# Patient Record
Sex: FEMALE | Race: BLACK OR AFRICAN AMERICAN | NOT HISPANIC OR LATINO | Employment: OTHER | URBAN - METROPOLITAN AREA
[De-identification: names, ages, dates, MRNs, and addresses within clinical notes are randomized per-mention and may not be internally consistent; named-entity substitution may affect disease eponyms.]

---

## 2017-05-11 ENCOUNTER — HOSPITAL ENCOUNTER (EMERGENCY)
Facility: HOSPITAL | Age: 62
Discharge: HOME | End: 2017-05-11
Attending: EMERGENCY MEDICINE
Payer: COMMERCIAL

## 2017-05-11 VITALS
WEIGHT: 230 LBS | OXYGEN SATURATION: 98 % | HEART RATE: 100 BPM | RESPIRATION RATE: 18 BRPM | HEIGHT: 67 IN | DIASTOLIC BLOOD PRESSURE: 88 MMHG | TEMPERATURE: 99 F | SYSTOLIC BLOOD PRESSURE: 173 MMHG | BODY MASS INDEX: 36.1 KG/M2

## 2017-05-11 DIAGNOSIS — J68.0 ACUTE CHEMICAL PNEUMONITIS: ICD-10-CM

## 2017-05-11 PROCEDURE — 94640 AIRWAY INHALATION TREATMENT: CPT

## 2017-05-11 PROCEDURE — 25000242 PHARM REV CODE 250 ALT 637 W/ HCPCS: Performed by: EMERGENCY MEDICINE

## 2017-05-11 PROCEDURE — 63600175 PHARM REV CODE 636 W HCPCS: Performed by: EMERGENCY MEDICINE

## 2017-05-11 PROCEDURE — 99284 EMERGENCY DEPT VISIT MOD MDM: CPT

## 2017-05-11 RX ORDER — PREDNISONE 20 MG/1
40 TABLET ORAL
Status: COMPLETED | OUTPATIENT
Start: 2017-05-11 | End: 2017-05-11

## 2017-05-11 RX ORDER — PREDNISONE 20 MG/1
40 TABLET ORAL DAILY
Qty: 8 TABLET | Refills: 0 | Status: SHIPPED | OUTPATIENT
Start: 2017-05-11 | End: 2017-05-15

## 2017-05-11 RX ORDER — IPRATROPIUM BROMIDE AND ALBUTEROL SULFATE 2.5; .5 MG/3ML; MG/3ML
3 SOLUTION RESPIRATORY (INHALATION)
Status: COMPLETED | OUTPATIENT
Start: 2017-05-11 | End: 2017-05-11

## 2017-05-11 RX ADMIN — IPRATROPIUM BROMIDE AND ALBUTEROL SULFATE 3 ML: .5; 3 SOLUTION RESPIRATORY (INHALATION) at 02:05

## 2017-05-11 RX ADMIN — PREDNISONE 40 MG: 20 TABLET ORAL at 02:05

## 2017-05-11 NOTE — ED AVS SNAPSHOT
[facility]  [id number] [place] [id number]               Casie Frias Givens   2017  1:33 AM   ED    Description:  Female : 1955   Department:  [facility]           Your Care was Coordinated By:     Provider Role From To    Alcon Larson MD Attending Provider 17 0152 --      Reason for Visit     Toxic Inhalation           Diagnoses this Visit        Comments    Acute chemical pneumonitis           ED Disposition     ED Disposition Condition Comment    Discharge             To Do List           Follow-up Information     Follow up with Buck Epperson MD In 4 days.    Specialty:  Family Medicine    Contact information:    [id number]  [facility]  [facility] [address] [phone]          Follow up with [facility].    Specialty:  Emergency Medicine    Why:  As needed, If symptoms worsen    Contact information:    [address] [place] [address] [phone]       These Medications        Disp Refills Start End    predniSONE (DELTASONE) 20 MG tablet 8 tablet 0 2017 5/15/2017    Take 2 tablets (40 mg total) by mouth once daily. - Oral    Pharmacy: [id number] [place], [place] - [id number] [place] [address]. Ph #: [phone]         [facility]     [facility] Care Line -  Assistance  Unless otherwise directed by your provider, please contact [facility], our nurse care line that is available for  assistance.     Registered nurses in the [facility] provide: appointment scheduling, clinical advisement, health education, and other advisory services.  Call: [phone] (toll free)               Medications           START taking these NEW medications        Refills    predniSONE (DELTASONE) 20 MG tablet 0    Sig: Take 2 tablets (40 mg total) by mouth once daily.    Class: Print     "Route: Oral      These medications were administered today        Dose Freq    albuterol-ipratropium 2.5mg-0.5mg/3mL nebulizer solution 3 mL 3 mL Every 5 min    Sig: Take 3 mLs by nebulization every 5 (five) minutes.    Class: Normal    Route: Nebulization    predniSONE tablet 40 mg 40 mg ED 1 Time    Sig: Take 2 tablets (40 mg total) by mouth ED 1 Time.    Class: Normal    Route: Oral           Verify that the below list of medications is an accurate representation of the medications you are currently taking.  If none reported, the list may be blank. If incorrect, please contact your healthcare provider. Carry this list with you in case of emergency.           Current Medications     multivitamin with minerals tablet Take 1 tablet by mouth once daily.    olmesartan-hydrochlorothiazide (BENICAR HCT) 20-12.5 mg per tablet Take 1 tablet by mouth.    pravastatin (PRAVACHOL) 80 MG tablet Take 80 mg by mouth.    alprazolam (XANAX) 0.25 MG tablet Take 0.25 mg by mouth.    escitalopram oxalate (LEXAPRO) 20 MG tablet Take 20 mg by mouth.    predniSONE (DELTASONE) 20 MG tablet Take 2 tablets (40 mg total) by mouth once daily.           Clinical Reference Information           Your Vitals Were     BP Pulse Temp Resp Height Weight    173/88 100 98.7 °F (37.1 °C) (Oral) 18 5' 7" (1.702 m) 104.3 kg (230 lb)    SpO2 BMI             98% 36.02 kg/m2         Allergies as of 5/11/2017     No Known Allergies      Immunizations Administered on Date of Encounter - 5/11/2017     None      ED Micro, Lab, POCT     None      ED Imaging Orders     Start Ordered       Status Ordering Provider    05/11/17 0201 05/11/17 0200  X-Ray Chest 1 View  1 time imaging      In process         Discharge Instructions         Bronchitis, Viral (Adult)    You have a viral bronchitis. Bronchitis is inflammation and swelling of the lining of the lungs. This is often caused by an infection. Symptoms include a dry, hacking cough that is worse at night. The " cough may bring up yellow-green mucus. You may also feel short of breath or wheeze. Other symptoms may include tiredness, chest discomfort, and chills.  Bronchitis that is caused by a virus is not treated with antibiotics. Instead, medicines may be given to help relieve symptoms. Symptoms can last up to 2 weeks, although the cough may last much longer.  This illness is contagious during the first few days and is spread through the air by coughing and sneezing, or by direct contact (touching the sick person and then touching your own eyes, nose, or mouth).  Most viral illnesses resolve within 10 to 14 days with rest and simple home remedies, although they may sometimes last for several weeks.  Home care  · If symptoms are severe, rest at home for the first 2 to 3 days. When you go back to your usual activities, don't let yourself get too tired.  · Do not smoke. Also avoid being exposed to secondhand smoke.  · You may use over-the-counter medicine to control fever or pain, unless another pain medicine was prescribed. (Note: If you have chronic liver or kidney disease or have ever had a stomach ulcer or gastrointestinal bleeding, talk with your healthcare provider before using these medicines. Also talk to your provider if you are taking medicine to prevent blood clots.) Aspirin should never be given to anyone younger than 18 years of age who is ill with a viral infection or fever. It may cause severe liver or brain damage.  · Your appetite may be poor, so a light diet is fine. Avoid dehydration by drinking 6 to 8 glasses of fluids per day (such as water, soft drinks, sports drinks, juices, tea, or soup). Extra fluids will help loosen secretions in the nose and lungs.  · Over-the-counter cough, cold, and sore-throat medicines will not shorten the length of the illness, but they may help to reduce symptoms. (Note: Do not use decongestants if you have high blood pressure.)  Follow-up care  Follow up with your healthcare  provider, or as advised. If you had an X-ray or ECG (electrocardiogram), a specialist will review it. You will be notified of any new findings that may affect your care.  Note: If you are age 65 or older, or if you have a chronic lung disease or condition that affects your immune system, or you smoke, talk to your healthcare provider about having pneumococcal vaccinations and a yearly influenza vaccination (flu shot).  When to seek medical advice  Call your healthcare provider right away if any of these occur:  · Fever of 100.4°F (38°C) or higher  · Coughing up increased amounts of colored sputum  · Weakness, drowsiness, headache, facial pain, ear pain, or a stiff neck  Call 911, or get immediate medical care  Contact emergency services right away if any of these occur:  · Coughing up blood  · Worsening weakness, drowsiness, headache, or stiff neck  · Trouble breathing, wheezing, or pain with breathing  Date Last Reviewed: 9/13/2015  © 4671-2630 [employer]. [address] [place] [address]. All rights reserved. This information is not intended as a substitute for professional medical care. Always follow your healthcare professional's instructions.          [facility] Sign-Up     Activating your [facility] account is as easy as 1-2-3!     1) Visit [email or web address], select Sign Up Now, enter this activation code and your date of birth, then select Next.  [email or web address]  Expires: 6/25/2017  3:37 AM      2) Create a username and password to use when you visit [facility] in the future and select a security question in case you lose your password and select Next.    3) Enter your e-mail address and click Sign Up!    Additional Information  If you have questions, please e-mail [email or web address] or call [phone] to talk to our [facility] staff. Remember, [facility] is NOT to be used for urgent needs. For medical emergencies, dial 911.         Smoking Cessation     If you would like to quit  smoking:   You may be eligible for free services if you are a [place] resident and started smoking cigarettes before September 1, 1988.  Call the Smoking Cessation Trust (SCT) toll free at (390) 468-9335 or (866) 193-2198.   Call 1-800-QUIT-NOW if you do not meet the above criteria.   Contact us via email: [email or web address]   View our website for more information: [email or web address]         [facility]  complies with applicable Federal civil rights laws and does not discriminate on the basis of race, color, national origin, age, disability, or sex.        Language Assistance Services     ATTENTION: Language assistance services are available, free of charge. Please call [phone].      ATENCIÓN: Si habla oanh, tiene a navas disposición servicios gratuitos de asistencia lingüística. Llame al [phone].     CHÚ Ý: N?u b?n nói Ti?ng Vi?t, có các d?ch v? h? tr? ngôn ng? mi?n phí dành cho b?n. G?i s? [phone].

## 2017-05-11 NOTE — ED PROVIDER NOTES
"SCRIBE #1 NOTE: I, Emely Mckeon, am scribing for, and in the presence of, Alcon Larson MD. I have scribed the entire note.      History      Chief Complaint   Patient presents with    Toxic Inhalation     mixing "shark" and bleach to clean mold. coughing and SOB since inhalation       Review of patient's allergies indicates:  No Known Allergies     HPI   HPI    2017, 1:59 AM   History obtained from the patient      History of Present Illness: Casie Gaines is a 61 y.o. female patient who presents to the Emergency Department for toxic inhalation which onset suddenly today. Symptoms are episodic and moderate in severity. Pt states that she sprayed mold remover on her floor boards and then bleach 2 hours later. No mitigating or exacerbating factors reported. Associated sxs include eye redness, nausea, emesis, SOB and cough. Patient denies any fever, diarrhea, abd pain, visual disturbance, dizziness, lightheadedness, HA, CP, back pain, flank pain, dysuria, hematuria, and all other sxs at this time. No further complaints or concerns at this time.         Arrival mode: Personal vehicle    PCP: Buck Epperson MD       Past Medical History:  Past Medical History:   Diagnosis Date    Anemia     Anxiety     Arthritis     knees    CHF (congestive heart failure)     Hyperlipemia     Hypertension     Neck pain        Past Surgical History:  Past Surgical History:   Procedure Laterality Date     SECTION      X 1    DILATION AND CURETTAGE OF UTERUS      HYSTERECTOMY      RALH for fibroids (still has ovaries)    TUBAL LIGATION           Family History:  Family History   Problem Relation Age of Onset    Anesthesia problems Other     Breast cancer Maternal Aunt     Breast cancer Paternal Aunt     Ovarian cancer Paternal Aunt     Colon cancer Brother     Thrombophilia Neg Hx        Social History:  Social History     Social History Main Topics    Smoking status: Former Smoker     Quit date: " 1/1/1986    Smokeless tobacco: Never Used      Comment: States started back 2 months ago after 30 years    Alcohol use 0.0 oz/week     0 Standard drinks or equivalent per week      Comment: occasionally    Drug use: No    Sexual activity: Yes     Partners: Male      Comment: hyst; mut monog       ROS   Review of Systems   Constitutional: Negative for fever.   HENT: Negative for sore throat.    Eyes: Positive for redness. Negative for discharge and visual disturbance.   Respiratory: Positive for cough and shortness of breath. Negative for wheezing.    Cardiovascular: Negative for chest pain.   Gastrointestinal: Positive for nausea and vomiting. Negative for abdominal pain, constipation and diarrhea.   Genitourinary: Negative for decreased urine volume, difficulty urinating, dysuria, flank pain and hematuria.   Musculoskeletal: Negative for back pain.   Skin: Negative for rash.   Neurological: Negative for dizziness, weakness, light-headedness and headaches.   Hematological: Does not bruise/bleed easily.       Physical Exam    Initial Vitals   BP Pulse Resp Temp SpO2   05/11/17 0131 05/11/17 0131 05/11/17 0131 05/11/17 0131 05/11/17 0131   173/88 90 18 98.7 °F (37.1 °C) 97 %      Physical Exam  Nursing Notes and Vital Signs Reviewed.  Constitutional: Patient is in no acute distress. Awake and alert. Well-developed and well-nourished. Smells of bleach.   Head: Atraumatic. Normocephalic.  Eyes: PERRL. EOM intact. Conjunctivae are not pale. Scleral icterus is erythematous.  ENT: Mucous membranes are moist. Oropharynx is clear and symmetric.    Neck: Supple. Full ROM. No lymphadenopathy.  Cardiovascular: Regular rate. Regular rhythm. No murmurs, rubs, or gallops. Distal pulses are 2+ and symmetric.  Pulmonary/Chest: No respiratory distress. Clear to auscultation bilaterally. No wheezing, rales, or rhonchi.  Abdominal: Soft and non-distended.  There is no tenderness.  No rebound, guarding, or rigidity. Good bowel  "sounds.  Musculoskeletal: Moves all extremities. No obvious deformities. No edema. No calf tenderness.  Skin: Warm and dry.  Neurological:  Alert, awake, and appropriate.  Normal speech.  No acute focal neurological deficits are appreciated.  Psychiatric: Normal affect. Good eye contact. Appropriate in content.    ED Course    Procedures  ED Vital Signs:  Vitals:    05/11/17 0131 05/11/17 0206 05/11/17 0214 05/11/17 0217   BP: (!) 173/88      Pulse: 90 85 88 100   Resp: 18 20 20 18   Temp: 98.7 °F (37.1 °C)      TempSrc: Oral      SpO2: 97% 98% 98% 98%   Weight: 104.3 kg (230 lb)      Height: 5' 7" (1.702 m)          Imaging Results:  Imaging Results         X-Ray Chest 1 View (In process) Per ED physician, pt's CXR results NAF.                    The Emergency Provider reviewed the vital signs and test results, which are outlined above.    ED Discussion     3:39 AM: Reassessed pt at this time. Discussed with pt all pertinent ED information and results. Discussed pt dx and plan of tx. Gave pt all f/u and return to the ED instructions. All questions and concerns were addressed at this time. Pt expresses understanding of information and instructions, and is comfortable with plan to discharge. Pt is stable for discharge.        ED Medication(s):  Medications   albuterol-ipratropium 2.5mg-0.5mg/3mL nebulizer solution 3 mL (3 mLs Nebulization Given 5/11/17 0217)   predniSONE tablet 40 mg (40 mg Oral Given 5/11/17 0207)       Discharge Medication List as of 5/11/2017  3:37 AM      START taking these medications    Details   predniSONE (DELTASONE) 20 MG tablet Take 2 tablets (40 mg total) by mouth once daily., Starting 5/11/2017, Until Mon 5/15/17, Print             Follow-up Information     Follow up with Buck Epperson MD In 4 days.    Specialty:  Family Medicine    Contact information:    [id number]  [facility]  [facility] [address] [phone]          [facility] with [facility]    Specialty:  Emergency Medicine    Why:  As needed, If symptoms worsen    Contact information:    [address] [facility] [address]  [facility] [address] [phone]            Medical Decision Making    Medical Decision Making:   Clinical Tests:   Radiological Study: Ordered and Reviewed           Scribe Attestation:   Scribe #1: I performed the above scribed service and the documentation accurately describes the services I performed. I attest to the accuracy of the note.    Attending:   Physician Attestation Statement for Scribe #1: I, Alcon Larson MD, personally performed the services described in this documentation, as scribed by Emely Mckeon, in my presence, and it is both accurate and complete.          Clinical Impression       ICD-10-CM ICD-9-CM   1. Acute chemical pneumonitis J68.0 506.0     E980.9       Disposition:   Disposition: Discharged  Condition: Stable         Alcon Larson MD  05/11/17 0535
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
## 2017-05-11 NOTE — DISCHARGE INSTRUCTIONS
Bronchitis, Viral (Adult)    You have a viral bronchitis. Bronchitis is inflammation and swelling of the lining of the lungs. This is often caused by an infection. Symptoms include a dry, hacking cough that is worse at night. The cough may bring up yellow-green mucus. You may also feel short of breath or wheeze. Other symptoms may include tiredness, chest discomfort, and chills.  Bronchitis that is caused by a virus is not treated with antibiotics. Instead, medicines may be given to help relieve symptoms. Symptoms can last up to 2 weeks, although the cough may last much longer.  This illness is contagious during the first few days and is spread through the air by coughing and sneezing, or by direct contact (touching the sick person and then touching your own eyes, nose, or mouth).  Most viral illnesses resolve within 10 to 14 days with rest and simple home remedies, although they may sometimes last for several weeks.  Home care  · If symptoms are severe, rest at home for the first 2 to 3 days. When you go back to your usual activities, don't let yourself get too tired.  · Do not smoke. Also avoid being exposed to secondhand smoke.  · You may use over-the-counter medicine to control fever or pain, unless another pain medicine was prescribed. (Note: If you have chronic liver or kidney disease or have ever had a stomach ulcer or gastrointestinal bleeding, talk with your healthcare provider before using these medicines. Also talk to your provider if you are taking medicine to prevent blood clots.) Aspirin should never be given to anyone younger than 18 years of age who is ill with a viral infection or fever. It may cause severe liver or brain damage.  · Your appetite may be poor, so a light diet is fine. Avoid dehydration by drinking 6 to 8 glasses of fluids per day (such as water, soft drinks, sports drinks, juices, tea, or soup). Extra fluids will help loosen secretions in the nose and lungs.  · Over-the-counter  cough, cold, and sore-throat medicines will not shorten the length of the illness, but they may help to reduce symptoms. (Note: Do not use decongestants if you have high blood pressure.)  Follow-up care  Follow up with your healthcare provider, or as advised. If you had an X-ray or ECG (electrocardiogram), a specialist will review it. You will be notified of any new findings that may affect your care.  Note: If you are age 65 or older, or if you have a chronic lung disease or condition that affects your immune system, or you smoke, talk to your healthcare provider about having pneumococcal vaccinations and a yearly influenza vaccination (flu shot).  When to seek medical advice  Call your healthcare provider right away if any of these occur:  · Fever of 100.4°F (38°C) or higher  · Coughing up increased amounts of colored sputum  · Weakness, drowsiness, headache, facial pain, ear pain, or a stiff neck  Call 911, or get immediate medical care  Contact emergency services right away if any of these occur:  · Coughing up blood  · Worsening weakness, drowsiness, headache, or stiff neck  · Trouble breathing, wheezing, or pain with breathing  Date Last Reviewed: 9/13/2015  © 0333-5137 [employer]. [address] [place] [address], [place] [address]. All rights reserved. This information is not intended as a substitute for professional medical care. Always follow your healthcare professional's instructions.

## 2018-01-31 ENCOUNTER — HOSPITAL ENCOUNTER (EMERGENCY)
Facility: HOSPITAL | Age: 63
Discharge: HOME | End: 2018-01-31
Payer: COMMERCIAL

## 2018-01-31 VITALS
HEART RATE: 93 BPM | HEIGHT: 67 IN | TEMPERATURE: 102 F | RESPIRATION RATE: 18 BRPM | OXYGEN SATURATION: 95 % | SYSTOLIC BLOOD PRESSURE: 190 MMHG | WEIGHT: 231 LBS | DIASTOLIC BLOOD PRESSURE: 81 MMHG | BODY MASS INDEX: 36.26 KG/M2

## 2018-01-31 DIAGNOSIS — R05.9 COUGH: ICD-10-CM

## 2018-01-31 DIAGNOSIS — J40 BRONCHITIS: Primary | ICD-10-CM

## 2018-01-31 LAB
ALBUMIN SERPL BCP-MCNC: 4.2 G/DL
ALP SERPL-CCNC: 161 U/L
ALT SERPL W/O P-5'-P-CCNC: 44 U/L
ANION GAP SERPL CALC-SCNC: 10 MMOL/L
AST SERPL-CCNC: 39 U/L
BASOPHILS # BLD AUTO: 0.03 K/UL
BASOPHILS NFR BLD: 0.4 %
BILIRUB SERPL-MCNC: 0.7 MG/DL
BUN SERPL-MCNC: 8 MG/DL
CALCIUM SERPL-MCNC: 9.7 MG/DL
CHLORIDE SERPL-SCNC: 105 MMOL/L
CO2 SERPL-SCNC: 27 MMOL/L
CREAT SERPL-MCNC: 0.8 MG/DL
DIFFERENTIAL METHOD: ABNORMAL
EOSINOPHIL # BLD AUTO: 0.3 K/UL
EOSINOPHIL NFR BLD: 3 %
ERYTHROCYTE [DISTWIDTH] IN BLOOD BY AUTOMATED COUNT: 14.5 %
EST. GFR  (AFRICAN AMERICAN): >60 ML/MIN/1.73 M^2
EST. GFR  (NON AFRICAN AMERICAN): >60 ML/MIN/1.73 M^2
FLUAV AG SPEC QL IA: NEGATIVE
FLUBV AG SPEC QL IA: NEGATIVE
GLUCOSE SERPL-MCNC: 96 MG/DL
HCT VFR BLD AUTO: 43.1 %
HGB BLD-MCNC: 14.1 G/DL
LYMPHOCYTES # BLD AUTO: 1.3 K/UL
LYMPHOCYTES NFR BLD: 15.3 %
MCH RBC QN AUTO: 28.6 PG
MCHC RBC AUTO-ENTMCNC: 32.7 G/DL
MCV RBC AUTO: 87 FL
MONOCYTES # BLD AUTO: 0.6 K/UL
MONOCYTES NFR BLD: 7 %
NEUTROPHILS # BLD AUTO: 6.1 K/UL
NEUTROPHILS NFR BLD: 74.3 %
PLATELET # BLD AUTO: 70 K/UL
PMV BLD AUTO: 11.1 FL
POTASSIUM SERPL-SCNC: 3.6 MMOL/L
PROT SERPL-MCNC: 8 G/DL
RBC # BLD AUTO: 4.93 M/UL
SODIUM SERPL-SCNC: 142 MMOL/L
SPECIMEN SOURCE: NORMAL
WBC # BLD AUTO: 8.24 K/UL

## 2018-01-31 PROCEDURE — 85025 COMPLETE CBC W/AUTO DIFF WBC: CPT

## 2018-01-31 PROCEDURE — 99284 EMERGENCY DEPT VISIT MOD MDM: CPT

## 2018-01-31 PROCEDURE — 87400 INFLUENZA A/B EACH AG IA: CPT

## 2018-01-31 PROCEDURE — 80053 COMPREHEN METABOLIC PANEL: CPT

## 2018-01-31 RX ORDER — METHYLPREDNISOLONE 4 MG/1
TABLET ORAL
Qty: 1 PACKAGE | Refills: 0 | Status: SHIPPED | OUTPATIENT
Start: 2018-01-31 | End: 2018-02-01

## 2018-01-31 RX ORDER — AZITHROMYCIN 250 MG/1
250 TABLET, FILM COATED ORAL DAILY
Qty: 6 TABLET | Refills: 0 | Status: SHIPPED | OUTPATIENT
Start: 2018-01-31 | End: 2018-01-31

## 2018-01-31 RX ORDER — AZITHROMYCIN 250 MG/1
250 TABLET, FILM COATED ORAL DAILY
Qty: 6 TABLET | Refills: 0 | Status: SHIPPED | OUTPATIENT
Start: 2018-01-31 | End: 2018-12-20 | Stop reason: ALTCHOICE

## 2018-02-01 ENCOUNTER — OFFICE VISIT (OUTPATIENT)
Dept: INTERNAL MEDICINE | Facility: CLINIC | Age: 63
End: 2018-02-01
Payer: COMMERCIAL

## 2018-02-01 VITALS
DIASTOLIC BLOOD PRESSURE: 88 MMHG | WEIGHT: 233.69 LBS | OXYGEN SATURATION: 94 % | BODY MASS INDEX: 36.68 KG/M2 | SYSTOLIC BLOOD PRESSURE: 136 MMHG | HEIGHT: 67 IN | HEART RATE: 102 BPM | TEMPERATURE: 99 F

## 2018-02-01 DIAGNOSIS — J40 BRONCHITIS: Primary | ICD-10-CM

## 2018-02-01 PROBLEM — E78.5 HYPERLIPIDEMIA: Status: ACTIVE | Noted: 2018-02-01

## 2018-02-01 PROCEDURE — 99203 OFFICE O/P NEW LOW 30 MIN: CPT | Mod: S$GLB,,, | Performed by: FAMILY MEDICINE

## 2018-02-01 PROCEDURE — 3008F BODY MASS INDEX DOCD: CPT | Mod: S$GLB,,, | Performed by: FAMILY MEDICINE

## 2018-02-01 PROCEDURE — 99999 PR PBB SHADOW E&M-EST. PATIENT-LVL IV: CPT | Mod: PBBFAC,,, | Performed by: FAMILY MEDICINE

## 2018-02-01 RX ORDER — ROSUVASTATIN CALCIUM 20 MG/1
20 TABLET, COATED ORAL DAILY
COMMUNITY
Start: 2017-07-17 | End: 2018-02-08

## 2018-02-01 RX ORDER — OLMESARTAN MEDOXOMIL AND HYDROCHLOROTHIAZIDE 40/12.5 40; 12.5 MG/1; MG/1
1 TABLET ORAL DAILY
COMMUNITY
Start: 2018-01-02 | End: 2018-02-08 | Stop reason: SDUPTHER

## 2018-02-01 RX ORDER — PREDNISONE 20 MG/1
40 TABLET ORAL DAILY
Qty: 10 TABLET | Refills: 0 | Status: SHIPPED | OUTPATIENT
Start: 2018-02-01 | End: 2018-02-06

## 2018-02-01 RX ORDER — ALBUTEROL SULFATE 90 UG/1
2 AEROSOL, METERED RESPIRATORY (INHALATION) EVERY 4 HOURS PRN
Qty: 18 G | Refills: 0 | Status: SHIPPED | OUTPATIENT
Start: 2018-02-01 | End: 2024-02-26

## 2018-02-01 NOTE — ED PROVIDER NOTES
"SCRIBE #1 NOTE: I, Yosef Grossman Escobar, am scribing for, and in the presence of, GATITO Noonan. I have scribed the entire note.      History      Chief Complaint   Patient presents with    Cough     pt c/o cough, congestion, HA, body aches x3 days       Review of patient's allergies indicates:  No Known Allergies     HPI   HPI    2018, 6:39 PM   History obtained from the patient and       History of Present Illness: Casie Gaines is a 62 y.o. female patient who presents to the Emergency Department for non-productive cough which onset gradually 2 days ago. Sxs are constant and moderate in severity. There are no mitigating or exacerbating factors noted. Associated sxs include congestion, HA, fever, body aches. Sxs described as "the flu".  Pt denies any N/V/D, CP, SOB, numbness, ABD pain, dysuria, and all other sxs at this time. Prior tx includes theraflu without relief. No further complaints or concerns at this time.       Arrival mode: Personal vehicle      PCP: Buck Epperson MD       Past Medical History:  Past Medical History:   Diagnosis Date    Anemia     Anxiety     Arthritis     knees    CHF (congestive heart failure)     Hyperlipemia     Hypertension     Neck pain        Past Surgical History:  Past Surgical History:   Procedure Laterality Date     SECTION      X 1    DILATION AND CURETTAGE OF UTERUS      HYSTERECTOMY      RALH for fibroids (still has ovaries)    TUBAL LIGATION           Family History:  Family History   Problem Relation Age of Onset    Anesthesia problems Other     Breast cancer Maternal Aunt     Breast cancer Paternal Aunt     Ovarian cancer Paternal Aunt     Colon cancer Brother     Thrombophilia Neg Hx        Social History:  Social History     Social History Main Topics    Smoking status: Former Smoker     Quit date: 1986    Smokeless tobacco: Never Used      Comment: States started back 2 months ago after 30 years    Alcohol " use 0.0 oz/week      Comment: occasionally    Drug use: No    Sexual activity: Yes     Partners: Male      Comment: hyst; mut monog       ROS   Review of Systems   Constitutional: Positive for fever.   HENT: Positive for congestion. Negative for sore throat.    Respiratory: Positive for cough. Negative for shortness of breath.    Cardiovascular: Negative for chest pain.   Gastrointestinal: Negative for abdominal pain, constipation, diarrhea, nausea and vomiting.   Genitourinary: Negative for dysuria.   Musculoskeletal: Positive for myalgias. Negative for back pain.   Skin: Negative for rash.   Neurological: Positive for headaches. Negative for syncope and weakness.   Hematological: Does not bruise/bleed easily.     Physical Exam      Initial Vitals [01/31/18 1722]   BP Pulse Resp Temp SpO2   (!) 186/94 88 (!) 22 100 °F (37.8 °C) (!) 93 %      MAP       124.67          Physical Exam  Nursing Notes and Vital Signs Reviewed.  Constitutional: Patient is in no acute distress. Well-developed and well-nourished. Active cough.  Head: Atraumatic. Normocephalic.  Eyes: PERRL. EOM intact. Conjunctivae are not pale. No scleral icterus.  ENT: Mucous membranes are moist. Oropharynx is clear and symmetric. Maxillary sinus tenderness. Clear rhinorrhea.  Neck: Supple. Full ROM. No lymphadenopathy.  Cardiovascular: Regular rate. Regular rhythm. No murmurs, rubs, or gallops. Distal pulses are 2+ and symmetric.  Pulmonary/Chest: No respiratory distress. Expiratory wheezing.  Abdominal: Soft and non-distended.  There is no tenderness.  No rebound, guarding, or rigidity. Good bowel sounds.  Genitourinary: No CVA tenderness  Musculoskeletal: Moves all extremities. No obvious deformities. No edema. No calf tenderness.  Skin: Warm and dry.  Neurological:  Alert, awake, and appropriate.  Normal speech.  No acute focal neurological deficits are appreciated.  Psychiatric: Normal affect. Good eye contact. Appropriate in content.    ED Course   "  Procedures  ED Vital Signs:  Vitals:    01/31/18 1722 01/31/18 2000   BP: (!) 186/94 (!) 190/81   Pulse: 88 93   Resp: (!) 22 18   Temp: 100 °F (37.8 °C) (!) 101.8 °F (38.8 °C)   TempSrc: Oral Oral   SpO2: (!) 93% 95%   Weight: 104.8 kg (231 lb)    Height: 5' 7" (1.702 m)        Abnormal Lab Results:  Labs Reviewed   CBC W/ AUTO DIFFERENTIAL - Abnormal; Notable for the following:        Result Value    Platelets 70 (*)     Gran% 74.3 (*)     Lymph% 15.3 (*)     All other components within normal limits   COMPREHENSIVE METABOLIC PANEL - Abnormal; Notable for the following:     Alkaline Phosphatase 161 (*)     All other components within normal limits   INFLUENZA A AND B ANTIGEN        All Lab Results:  Results for orders placed or performed during the hospital encounter of 01/31/18   Influenza antigen Nasopharyngeal Swab   Result Value Ref Range    Influenza A Ag, EIA Negative Negative    Influenza B Ag, EIA Negative Negative    Flu A & B Source Nasopharyngeal Swab    CBC auto differential   Result Value Ref Range    WBC 8.24 3.90 - 12.70 K/uL    RBC 4.93 4.00 - 5.40 M/uL    Hemoglobin 14.1 12.0 - 16.0 g/dL    Hematocrit 43.1 37.0 - 48.5 %    MCV 87 82 - 98 fL    MCH 28.6 27.0 - 31.0 pg    MCHC 32.7 32.0 - 36.0 g/dL    RDW 14.5 11.5 - 14.5 %    Platelets 70 (L) 150 - 350 K/uL    MPV 11.1 9.2 - 12.9 fL    Gran # (ANC) 6.1 1.8 - 7.7 K/uL    Lymph # 1.3 1.0 - 4.8 K/uL    Mono # 0.6 0.3 - 1.0 K/uL    Eos # 0.3 0.0 - 0.5 K/uL    Baso # 0.03 0.00 - 0.20 K/uL    Gran% 74.3 (H) 38.0 - 73.0 %    Lymph% 15.3 (L) 18.0 - 48.0 %    Mono% 7.0 4.0 - 15.0 %    Eosinophil% 3.0 0.0 - 8.0 %    Basophil% 0.4 0.0 - 1.9 %    Differential Method Automated    Comprehensive metabolic panel   Result Value Ref Range    Sodium 142 136 - 145 mmol/L    Potassium 3.6 3.5 - 5.1 mmol/L    Chloride 105 95 - 110 mmol/L    CO2 27 23 - 29 mmol/L    Glucose 96 70 - 110 mg/dL    BUN, Bld 8 8 - 23 mg/dL    Creatinine 0.8 0.5 - 1.4 mg/dL    Calcium 9.7 " 8.7 - 10.5 mg/dL    Total Protein 8.0 6.0 - 8.4 g/dL    Albumin 4.2 3.5 - 5.2 g/dL    Total Bilirubin 0.7 0.1 - 1.0 mg/dL    Alkaline Phosphatase 161 (H) 55 - 135 U/L    AST 39 10 - 40 U/L    ALT 44 10 - 44 U/L    Anion Gap 10 8 - 16 mmol/L    eGFR if African American >60 >60 mL/min/1.73 m^2    eGFR if non African American >60 >60 mL/min/1.73 m^2         Imaging Results:  Imaging Results          X-Ray Chest PA And Lateral (Final result)  Result time 01/31/18 19:13:48    Final result by Francesca Hutton MD (01/31/18 19:13:48)                 Impression:      No acute pulmonary infiltrate.      Electronically signed by: FRANCESCA HUTTON MD  Date:     01/31/18  Time:    19:13              Narrative:    Exam: XR CHEST PA AND LATERAL,    Date:  01/31/18 18:56:39    History: Cough and congestion    Comparison:  05/11/2017    Findings: Heart size is normal.  The lung fields are clear.                                      The Emergency Provider reviewed the vital signs and test results, which are outlined above.    ED Discussion     8:08 PM: Reassessed pt. Discussed with pt all pertinent ED information and results. Discussed plan of treatment with pt. Gave pt all f/u and return to the ED instructions. All questions and concerns were addressed at this time. Pt understands and agrees to plan as discussed. Pt is stable for discharge.     Patient presents with upper respiratory and flulike symptoms. Based on my assessment in the ED, I do not suspect any respiratory, airway, pulmonary, cardiovascular (including myocarditis), metabolic, CNS, medical, or surgical emergency medical condition. I have discussed with the patient and/or caregiver signs and symptoms for secondary bacterial infections, such as pneumonia. I believe that the patient's symptoms are most consistent with a viral illness, possibly influenza. Patient is safe for discharge home with conservative therapy.    ED Medication(s):  Medications - No data to  display        Follow-up Information     Schedule an appointment as soon as possible for a visit  with Buck Epperson MD.    Specialty:  Family Medicine  Contact information:  [address]  [address]  [facility] [address] [phone]             [facility]    Specialty:  Emergency Medicine  Why:  If symptoms worsen  Contact information:  [address]  [facility] [address] [phone]                   Medical Decision Making    Medical Decision Making:   Clinical Tests:   Lab Tests: Reviewed and Ordered  Radiological Study: Reviewed and Ordered           Scribe Attestation:   Scribe #1: I performed the above scribed service and the documentation accurately describes the services I performed. I attest to the accuracy of the note.    Attending:   Physician Attestation Statement for Scribe #1: I, GATITO Noonan, personally performed the services described in this documentation, as scribed by Yosef Cody, in my presence, and it is both accurate and complete.          Clinical Impression       ICD-10-CM ICD-9-CM   1. Bronchitis J40 490   2. Cough R05 786.2       Disposition:   Disposition: Discharged  Condition: Stable         GATITO Cruz-C  02/01/18 0155

## 2018-02-01 NOTE — PROGRESS NOTES
Casie Frias Givens  2018  [id number]    Buck Epperson MD  Patient Care Team:  Buck Epperson MD as PCP - General (Family Medicine)  Has the patient seen any provider outside of the [facility] network since the last visit? (no). If yes, HIPPA forms completed and records requested.        Visit Type:New patient to primary care, ER follow up    Chief Complaint:  Chief Complaint   Patient presents with    Follow-up     ER, Bronchitis Got Worse Last Night       History of Present Illness:  62 year old here for follow up.  ER yesterday for flu like symptoms and respiratory symptom. She had cough for 2 days, and congestion, headache, fever and body aches.     She was given Zpack and Medrol dose pack.  Chest xray was negative.  Flu negative.   CMP okay besides alk phose being slightly elevated.  Temp in .    She was followed by Dr. Epperson at [facility].  She has history of HTN, with CHF. She has HLD.  She is managed with Benicar and Crestor.                 History:  Past Medical History:   Diagnosis Date    Anemia     Anxiety     Arthritis     knees    CHF (congestive heart failure)     Hyperlipemia     Hypertension     Neck pain      Past Surgical History:   Procedure Laterality Date     SECTION      X 1    DILATION AND CURETTAGE OF UTERUS      HYSTERECTOMY      RALH for fibroids (still has ovaries)    TUBAL LIGATION       Family History   Problem Relation Age of Onset    Anesthesia problems Other     Breast cancer Maternal Aunt     Breast cancer Paternal Aunt     Ovarian cancer Paternal Aunt     Colon cancer Brother     Thrombophilia Neg Hx      Social History     Social History    Marital status:      Spouse name: N/A    Number of children: N/A    Years of education: N/A     Occupational History    Not on file.     Social History Main Topics    Smoking status: Former Smoker     Quit date: 1986    Smokeless tobacco: Never Used      Comment: States started quit 2 months  ago after 30 years    Alcohol use 0.0 oz/week      Comment: occasionally    Drug use: No    Sexual activity: Yes     Partners: Male      Comment: hyst; mut monog     Other Topics Concern    Not on file     Social History Narrative    No narrative on file     Patient Active Problem List   Diagnosis    Hypertension    Osteoarthritis of both knees    History of CHF (congestive heart failure)    LIZANDRO (stress urinary incontinence, female)    History of total hysterectomy with bilateral salpingo-oophorectomy (BSO)    Hyperlipidemia     Review of patient's allergies indicates:  No Known Allergies    The following were reviewed at this visit: active problem list, medication list, allergies, family history, social history, and health maintenance.    Medications:  Current Outpatient Prescriptions on File Prior to Visit   Medication Sig Dispense Refill    azithromycin (Z-ROSE) 250 MG tablet Take 1 tablet (250 mg total) by mouth once daily. Take first 2 tablets together, then 1 every day until finished. 6 tablet 0    multivitamin with minerals tablet Take 1 tablet by mouth once daily.      [DISCONTINUED] methylPREDNISolone (MEDROL DOSEPACK) 4 mg tablet Follow directions on package insert. 1 Package 0    [DISCONTINUED] olmesartan-hydrochlorothiazide (BENICAR HCT) 20-12.5 mg per tablet Take 1 tablet by mouth once daily.       [DISCONTINUED] alprazolam (XANAX) 0.25 MG tablet Take 0.25 mg by mouth.      [DISCONTINUED] escitalopram oxalate (LEXAPRO) 20 MG tablet Take 20 mg by mouth.      [DISCONTINUED] pravastatin (PRAVACHOL) 80 MG tablet Take 80 mg by mouth.       No current facility-administered medications on file prior to visit.        Medications have been reviewed and reconciled with patient at this visit.  Barriers to medications present (no)    Adverse reactions to current medications (no)    Over the counter medications reviewed (Yes ), and if needed added to active Medication list at this visit.      Exam:  Wt Readings from Last 3 Encounters:   02/01/18 106 kg (233 lb 11.2 oz)   01/31/18 104.8 kg (231 lb)   05/11/17 104.3 kg (230 lb)     Temp Readings from Last 3 Encounters:   02/01/18 98.6 °F (37 °C) (Tympanic)   01/31/18 (!) 101.8 °F (38.8 °C) (Oral)   05/11/17 98.7 °F (37.1 °C) (Oral)     BP Readings from Last 3 Encounters:   02/01/18 136/88   01/31/18 (!) 190/81   05/11/17 (!) 173/88     Pulse Readings from Last 3 Encounters:   02/01/18 102   01/31/18 93   05/11/17 100     Body mass index is 36.59 kg/m².    Review of Systems   Constitutional: Positive for chills and fever.   HENT: Positive for congestion. Negative for sinus pain and sore throat.    Eyes: Negative for blurred vision and double vision.   Respiratory: Positive for cough. Negative for sputum production, shortness of breath and wheezing.    Cardiovascular: Negative for chest pain, palpitations and leg swelling.   Gastrointestinal: Negative for abdominal pain, constipation, diarrhea, heartburn, nausea and vomiting.   Genitourinary: Negative.    Musculoskeletal: Negative.    Skin: Negative.  Negative for rash.   Neurological: Positive for headaches.   Endo/Heme/Allergies: Negative.  Negative for polydipsia. Does not bruise/bleed easily.   Psychiatric/Behavioral: Negative for depression and substance abuse.         Physical Exam   Constitutional: She is oriented to person, place, and time. She appears well-developed and well-nourished. No distress.   HENT:   Head: Normocephalic and atraumatic.   Right Ear: External ear normal.   Left Ear: External ear normal.   Nose: Nose normal.   Mouth/Throat: Oropharynx is clear and moist. No oropharyngeal exudate.   Eyes: Conjunctivae and EOM are normal. Pupils are equal, round, and reactive to light. Right eye exhibits no discharge. Left eye exhibits no discharge.   Neck: Normal range of motion. Neck supple. No thyromegaly present.   Cardiovascular: Normal rate, regular rhythm, normal heart sounds and  intact distal pulses.    No murmur heard.  Pulmonary/Chest: Effort normal. She has decreased breath sounds in the right lower field and the left lower field. She has wheezes. She has rhonchi in the right middle field and the left middle field.   Abdominal: Soft. Bowel sounds are normal. She exhibits no distension and no mass. There is no tenderness.   Musculoskeletal: Normal range of motion. She exhibits no edema.   Lymphadenopathy:     She has no cervical adenopathy.   Neurological: She is alert and oriented to person, place, and time. No cranial nerve deficit.   Skin: Capillary refill takes less than 2 seconds. She is not diaphoretic.   Psychiatric: She has a normal mood and affect. Her behavior is normal. Judgment and thought content normal.   Nursing note and vitals reviewed.    Reviewed Chest Xray from ER at [facility]    Laboratory Reviewed ({Yes)  Lab Results   Component Value Date    WBC 8.24 01/31/2018    HGB 14.1 01/31/2018    HCT 43.1 01/31/2018    PLT 70 (L) 01/31/2018    ALT 44 01/31/2018    AST 39 01/31/2018     01/31/2018    K 3.6 01/31/2018     01/31/2018    CREATININE 0.8 01/31/2018    BUN 8 01/31/2018    CO2 27 01/31/2018    TSH 1.869 04/19/2013       Assessment:  The encounter diagnosis was Bronchitis.     Plan     Bronchitis  -     predniSONE (DELTASONE) 20 MG tablet; Take 2 tablets (40 mg total) by mouth once daily.  Dispense: 10 tablet; Refill: 0  -     albuterol 90 mcg/actuation inhaler; Inhale 2 puffs into the lungs every 4 (four) hours as needed for Wheezing. Rescue  Dispense: 18 g; Refill: 0    Recheck 1 day    Patient likely has the flu and tested negative  ER visit yesterday with labs reviewed  Chest xray was fine.    She is on antibotics and steroids.  I will change to higher dose steroids, as she probably has some component of COPD due to her smoking history    Pulse ox 93-94 when in room.      Start Albuterol MDI  Recheck tomorrow.    She will be switching providers to  [facility], and when ready and better from acute illness we will do HM.      Care Plan/Goals: Reviewed  (N/A)  Goals     None          Follow up: Follow-up in about 1 day (around 2/2/2018).    After visit summary was printed and given to patient upon discharge today.  Patient goals and care plan are included in After Visit Summary.

## 2018-02-02 ENCOUNTER — OFFICE VISIT (OUTPATIENT)
Dept: INTERNAL MEDICINE | Facility: CLINIC | Age: 63
End: 2018-02-02
Payer: COMMERCIAL

## 2018-02-02 VITALS
DIASTOLIC BLOOD PRESSURE: 80 MMHG | WEIGHT: 233 LBS | OXYGEN SATURATION: 95 % | BODY MASS INDEX: 36.57 KG/M2 | TEMPERATURE: 98 F | HEART RATE: 84 BPM | HEIGHT: 67 IN | SYSTOLIC BLOOD PRESSURE: 130 MMHG

## 2018-02-02 DIAGNOSIS — J40 BRONCHITIS: Primary | ICD-10-CM

## 2018-02-02 PROCEDURE — 99999 PR PBB SHADOW E&M-EST. PATIENT-LVL IV: CPT | Mod: PBBFAC,,, | Performed by: NURSE PRACTITIONER

## 2018-02-02 PROCEDURE — 99213 OFFICE O/P EST LOW 20 MIN: CPT | Mod: S$GLB,,, | Performed by: NURSE PRACTITIONER

## 2018-02-02 PROCEDURE — 3008F BODY MASS INDEX DOCD: CPT | Mod: S$GLB,,, | Performed by: NURSE PRACTITIONER

## 2018-02-02 NOTE — PROGRESS NOTES
"Subjective:       Patient ID: Casie Gaines is a 62 y.o. female.    Chief Complaint: follow up bronchitis    Patient presents for follow up of bronchitis. She reports feeling "much better" from yesterday after steroid dose increased. She used her inhaler twice yesterday but not yet today.       Review of Systems   Constitutional: Negative for chills, fatigue, fever and unexpected weight change.   Eyes: Negative for visual disturbance.   Respiratory: Positive for cough, shortness of breath (occasionally, improved) and wheezing (improved with inhaler). Negative for chest tightness.    Cardiovascular: Negative for chest pain and palpitations.   Musculoskeletal: Negative for myalgias.   Neurological: Negative for headaches.       Objective:      Physical Exam   Constitutional: She is oriented to person, place, and time. She appears well-developed and well-nourished.   HENT:   Head: Normocephalic and atraumatic.   Eyes: Conjunctivae and EOM are normal. Pupils are equal, round, and reactive to light. Right eye exhibits no discharge. Left eye exhibits no discharge.   Neck: Normal range of motion.   Cardiovascular: Normal rate and regular rhythm.  Exam reveals no gallop and no friction rub.    No murmur heard.  Pulmonary/Chest: Effort normal. No respiratory distress. She has decreased breath sounds (mildly diminished in the bases). She has no wheezes. She has no rales.   Neurological: She is alert and oriented to person, place, and time.   Skin: Skin is warm and dry. No rash noted.       Assessment:       1. Bronchitis        Plan:   Bronchitis  Comments:  improving, continue antibiotic and steroid until complete        Follow-up in about 1 week (around 2/9/2018), or if symptoms worsen or fail to improve, for annual wellness with Dr. Ang.      "

## 2018-02-02 NOTE — PATIENT INSTRUCTIONS
What Is Acute Bronchitis?  Acute bronchitis is when the airways in your lungs (bronchial tubes) become red and swollen (inflamed). It is usually caused by a viral infection. But it can also occur because of a bacteria or allergen. Symptoms include a cough that produces yellow or greenish mucus and can last for days or sometimes weeks.  Inside healthy lungs    Air travels in and out of the lungs through the airways. The linings of these airways produce sticky mucus. This mucus traps particles that enter the lungs. Tiny structures called cilia then sweep the particles out of the airways.     Healthy airway: Airways are normally open. Air moves in and out easily.      Healthy cilia: Tiny, hairlike cilia sweep mucus and particles up and out of the airways.   Lungs with bronchitis  Bronchitis often occurs with a cold or the flu virus. The airways become inflamed (red and swollen). There is a deep hacking cough from the extra mucus. Other symptoms may include:  · Wheezing  · Chest discomfort  · Shortness of breath  · Mild fever  A second infection, this time due to bacteria, may then occur. And airways irritated by allergens or smoke are more likely to get infected.        Inflamed airway: Inflammation and extra mucus narrow the airway, causing shortness of breath.      Impaired cilia: Extra mucus impairs cilia, causing congestion and wheezing. Smoking makes the problem worse.   Making a diagnosis  A physical exam, health history, and certain tests help your healthcare provider make the diagnosis.  Health history  Your healthcare provider will ask you about your symptoms.  The exam  Your provider listens to your chest for signs of congestion. He or she may also check your ears, nose, and throat.  Possible tests  · A sputum test for bacteria. This requires a sample of mucus from your lungs.  · A nasal or throat swab. This tests to see if you have a bacterial infection.  · A chest X-ray. This is done if your healthcare  provider thinks you have pneumonia.  · Tests to check for an underlying condition. Other tests may be done to check for things such as allergies, asthma, or COPD (chronic obstructive pulmonary disease). You may need to see a specialist for more lung function testing.  Treating a cough  The main treatment for bronchitis is easing symptoms. Avoiding smoke, allergens, and other things that trigger coughing can often help. If the infection is bacterial, you may be given antibiotics. During the illness, it's important to get plenty of sleep. To ease symptoms:  · Dont smoke. Also avoid secondhand smoke.  · Use a humidifier. Or try breathing in steam from a hot shower. This may help loosen mucus.  · Drink a lot of water and juice. They can soothe the throat and may help thin mucus.  · Sit up or use extra pillows when in bed. This helps to lessen coughing and congestion.  · Ask your provider about using medicine. Ask about using cough medicine, pain and fever medicine, or a decongestant.  Antibiotics  Most cases of bronchitis are caused by cold or flu viruses. They dont need antibiotics to treat them, even if your mucus is thick and green or yellow. Antibiotics dont treat viral illness and antibiotics have not been shown to have any benefit in cases of acute bronchitis. Taking antibiotics when they are not needed increases your risk of getting an infection later that is antibiotic-resistant. Antibiotics can also cause severe cases of diarrhea that require other antibiotics to treat.  It is important that you accept your healthcare provider's opinion to not use antibiotics. Your provider will prescribe antibiotics if the infection is caused by bacteria. If they are prescribed:  · Take all of the medicine. Take the medicine until it is used up, even if symptoms have improved. If you dont, the bronchitis may come back.  · Take the medicines as directed. For instance, some medicines should be taken with food.  · Ask about  side effects. Ask your provider or pharmacist what side effects are common, and what to do about them.  Follow-up care  You should see your provider again in 2 to 3 weeks. By this time, symptoms should have improved. An infection that lasts longer may mean you have a more serious problem.  Prevention  · Avoid tobacco smoke. If you smoke, quit. Stay away from smoky places. Ask friends and family not to smoke around you, or in your home or car.  · Get checked for allergies.  · Ask your provider about getting a yearly flu shot. Also ask about pneumococcal or pneumonia shots.  · Wash your hands often. This helps reduce the chance of picking up viruses that cause colds and flu.  Call your healthcare provider if:  · Symptoms worsen, or you have new symptoms  · Breathing problems worsen or  become severe  · Symptoms dont get better within a week, or within 3 days of taking antibiotics   Date Last Reviewed: 2/1/2017  © 7501-9828 [employer], [employer]. [address] [place] [address], [place] [address]. All rights reserved. This information is not intended as a substitute for professional medical care. Always follow your healthcare professional's instructions.

## 2018-02-08 ENCOUNTER — OFFICE VISIT (OUTPATIENT)
Dept: INTERNAL MEDICINE | Facility: CLINIC | Age: 63
End: 2018-02-08
Payer: COMMERCIAL

## 2018-02-08 VITALS
TEMPERATURE: 97 F | WEIGHT: 240 LBS | OXYGEN SATURATION: 95 % | DIASTOLIC BLOOD PRESSURE: 82 MMHG | SYSTOLIC BLOOD PRESSURE: 136 MMHG | HEART RATE: 78 BPM | BODY MASS INDEX: 37.67 KG/M2 | HEIGHT: 67 IN

## 2018-02-08 DIAGNOSIS — I10 ESSENTIAL HYPERTENSION: ICD-10-CM

## 2018-02-08 DIAGNOSIS — Z76.89 ESTABLISHING CARE WITH NEW DOCTOR, ENCOUNTER FOR: ICD-10-CM

## 2018-02-08 DIAGNOSIS — Z00.00 ANNUAL PHYSICAL EXAM: Primary | ICD-10-CM

## 2018-02-08 DIAGNOSIS — Z12.31 SCREENING MAMMOGRAM, ENCOUNTER FOR: ICD-10-CM

## 2018-02-08 DIAGNOSIS — E78.5 HYPERLIPIDEMIA, UNSPECIFIED HYPERLIPIDEMIA TYPE: ICD-10-CM

## 2018-02-08 DIAGNOSIS — D69.6 THROMBOCYTOPENIA: ICD-10-CM

## 2018-02-08 PROCEDURE — 99999 PR PBB SHADOW E&M-EST. PATIENT-LVL III: CPT | Mod: PBBFAC,,, | Performed by: FAMILY MEDICINE

## 2018-02-08 PROCEDURE — 99214 OFFICE O/P EST MOD 30 MIN: CPT | Mod: 25,S$GLB,, | Performed by: FAMILY MEDICINE

## 2018-02-08 PROCEDURE — 3008F BODY MASS INDEX DOCD: CPT | Mod: S$GLB,,, | Performed by: FAMILY MEDICINE

## 2018-02-08 PROCEDURE — 99396 PREV VISIT EST AGE 40-64: CPT | Mod: 25,S$GLB,, | Performed by: FAMILY MEDICINE

## 2018-02-08 RX ORDER — ROSUVASTATIN CALCIUM 10 MG/1
10 TABLET, COATED ORAL DAILY
COMMUNITY
End: 2018-02-08 | Stop reason: SDUPTHER

## 2018-02-08 RX ORDER — ROSUVASTATIN CALCIUM 10 MG/1
10 TABLET, COATED ORAL DAILY
Qty: 90 TABLET | Refills: 1 | Status: SHIPPED | OUTPATIENT
Start: 2018-02-08 | End: 2019-03-04

## 2018-02-08 RX ORDER — OLMESARTAN MEDOXOMIL AND HYDROCHLOROTHIAZIDE 40/12.5 40; 12.5 MG/1; MG/1
1 TABLET ORAL DAILY
Qty: 90 TABLET | Refills: 1 | Status: SHIPPED | OUTPATIENT
Start: 2018-02-08 | End: 2019-02-07 | Stop reason: SDUPTHER

## 2018-02-08 NOTE — PROGRESS NOTES
Casie Frias Givens  2018  [id number]    Buck Epperson MD  Patient Care Team:  Buck Epperson MD as PCP - General (Family Medicine)  Has the patient seen any provider outside of the [facility] network since the last visit? (yes). If yes, HIPPA forms completed and records requested.        Visit Type:Establish with new provider    Chief Complaint:  Chief Complaint   Patient presents with    Follow-up    Medication Refill       History of Present Illness:  62 year old here to establish care with new provider. She was seen last week for ER follow up from Bronchitis. That has resolved with steriods and inhaler.    She was followed by Dr. Epperson at [facility].    She has history of HTN controlled, on medication.    She is on cholesterol medication. Lipids due.  She had CBC and CMP done in ER, which were okay beside alk phose at 160.  Platelets 70 on labs. No history of low platelets    Needs MMG    Colon done  Denies any other complaints      History:  Past Medical History:   Diagnosis Date    Anemia     Anxiety     Arthritis     knees    CHF (congestive heart failure)     Hyperlipemia     Hypertension     Neck pain      Past Surgical History:   Procedure Laterality Date     SECTION      X 1    DILATION AND CURETTAGE OF UTERUS      HYSTERECTOMY      RALH for fibroids (still has ovaries)    TUBAL LIGATION       Family History   Problem Relation Age of Onset    Anesthesia problems Other     Breast cancer Maternal Aunt     Breast cancer Paternal Aunt     Ovarian cancer Paternal Aunt     Colon cancer Brother     Thrombophilia Neg Hx      Social History     Social History    Marital status:      Spouse name: N/A    Number of children: N/A    Years of education: N/A     Occupational History    Not on file.     Social History Main Topics    Smoking status: Former Smoker     Quit date: 1986    Smokeless tobacco: Never Used      Comment: States started quit 2 months ago after 30 years     Alcohol use 0.0 oz/week      Comment: occasionally    Drug use: No    Sexual activity: Yes     Partners: Male      Comment: hyst; mut monog     Other Topics Concern    Not on file     Social History Narrative    No narrative on file     Patient Active Problem List   Diagnosis    Hypertension    Osteoarthritis of both knees    History of CHF (congestive heart failure)    LIZANDRO (stress urinary incontinence, female)    History of total hysterectomy with bilateral salpingo-oophorectomy (BSO)    Hyperlipidemia     Review of patient's allergies indicates:  No Known Allergies    The following were reviewed at this visit: active problem list, medication list, allergies, family history, social history, and health maintenance.    Medications:  Current Outpatient Prescriptions on File Prior to Visit   Medication Sig Dispense Refill    albuterol 90 mcg/actuation inhaler Inhale 2 puffs into the lungs every 4 (four) hours as needed for Wheezing. Rescue 18 g 0    azithromycin (Z-ROSE) 250 MG tablet Take 1 tablet (250 mg total) by mouth once daily. Take first 2 tablets together, then 1 every day until finished. 6 tablet 0    [DISCONTINUED] multivitamin with minerals tablet Take 1 tablet by mouth once daily.       [DISCONTINUED] olmesartan-hydrochlorothiazide (BENICAR HCT) 40-12.5 mg Tab Take 1 tablet by mouth once daily.       [DISCONTINUED] rosuvastatin (CRESTOR) 20 MG tablet Take 20 mg by mouth once daily.        No current facility-administered medications on file prior to visit.        Medications have been reviewed and reconciled with patient at this visit.  Barriers to medications present (no)    Adverse reactions to current medications (no)    Over the counter medications reviewed (Yes ), and if needed added to active Medication list at this visit.     Exam:  Wt Readings from Last 3 Encounters:   02/08/18 108.9 kg (240 lb)   02/02/18 105.7 kg (233 lb)   02/01/18 106 kg (233 lb 11.2 oz)     Temp Readings from  Last 3 Encounters:   02/08/18 96.5 °F (35.8 °C) (Tympanic)   02/02/18 97.6 °F (36.4 °C) (Tympanic)   02/01/18 98.6 °F (37 °C) (Tympanic)     BP Readings from Last 3 Encounters:   02/08/18 136/82   02/02/18 130/80   02/01/18 136/88     Pulse Readings from Last 3 Encounters:   02/08/18 78   02/02/18 84   02/01/18 102     Body mass index is 37.58 kg/m².    Review of Systems   Constitutional: Negative.  Negative for chills and fever.   HENT: Negative.  Negative for congestion, sinus pain and sore throat.    Eyes: Negative for blurred vision and double vision.   Respiratory: Negative for cough, sputum production, shortness of breath and wheezing.    Cardiovascular: Negative for chest pain, palpitations and leg swelling.   Gastrointestinal: Negative for abdominal pain, constipation, diarrhea, heartburn, nausea and vomiting.   Genitourinary: Negative.    Musculoskeletal: Negative.    Skin: Negative.  Negative for rash.   Neurological: Negative.    Endo/Heme/Allergies: Negative.  Negative for polydipsia. Does not bruise/bleed easily.   Psychiatric/Behavioral: Negative for depression and substance abuse.         Physical Exam   Constitutional: She is oriented to person, place, and time. She appears well-developed and well-nourished. No distress.   HENT:   Head: Normocephalic and atraumatic.   Right Ear: External ear normal.   Left Ear: External ear normal.   Nose: Nose normal.   Mouth/Throat: Oropharynx is clear and moist. No oropharyngeal exudate.   Eyes: Conjunctivae and EOM are normal. Pupils are equal, round, and reactive to light. Right eye exhibits no discharge. Left eye exhibits no discharge.   Neck: Normal range of motion. Neck supple. No thyromegaly present.   Cardiovascular: Normal rate, regular rhythm, normal heart sounds and intact distal pulses.    No murmur heard.  Pulmonary/Chest: Effort normal and breath sounds normal. No respiratory distress. She has no wheezes.   Abdominal: Soft. Bowel sounds are normal.  She exhibits no distension and no mass. There is no tenderness.   Musculoskeletal: Normal range of motion. She exhibits no edema.   Lymphadenopathy:     She has no cervical adenopathy.   Neurological: She is alert and oriented to person, place, and time. No cranial nerve deficit.   Skin: Capillary refill takes less than 2 seconds. She is not diaphoretic.   Psychiatric: She has a normal mood and affect. Her behavior is normal. Judgment and thought content normal.   Nursing note and vitals reviewed.      Laboratory Reviewed ({N/A)  Lab Results   Component Value Date    WBC 8.24 01/31/2018    HGB 14.1 01/31/2018    HCT 43.1 01/31/2018    PLT 70 (L) 01/31/2018    ALT 44 01/31/2018    AST 39 01/31/2018     01/31/2018    K 3.6 01/31/2018     01/31/2018    CREATININE 0.8 01/31/2018    BUN 8 01/31/2018    CO2 27 01/31/2018    TSH 1.869 04/19/2013       Assessment:  The primary encounter diagnosis was Establishing care with new doctor, encounter for. Diagnoses of Essential hypertension, Hyperlipidemia, unspecified hyperlipidemia type, Screening mammogram, encounter for, and Thrombocytopenia were also pertinent to this visit.     Plan     Establishing care with new doctor, encounter for  -     Lipid panel; Future; Expected date: 02/08/2018  -     Hepatitis C antibody; Future; Expected date: 02/08/2018  -     CBC auto differential; Future; Expected date: 02/08/2018  -     Comprehensive metabolic panel; Future; Expected date: 02/08/2018    Essential hypertension    Hyperlipidemia, unspecified hyperlipidemia type  -     Lipid panel; Future; Expected date: 02/08/2018    Screening mammogram, encounter for  -     Mammo Digital Screening Bilateral With CAD; Future; Expected date: 02/08/2018    Thrombocytopenia  -     CBC auto differential; Future; Expected date: 02/08/2018    Other orders  -     olmesartan-hydrochlorothiazide (BENICAR HCT) 40-12.5 mg Tab; Take 1 tablet by mouth once daily.  Dispense: 90 tablet; Refill:  1  -     rosuvastatin (CRESTOR) 10 MG tablet; Take 1 tablet (10 mg total) by mouth once daily.  Dispense: 90 tablet; Refill: 1        Care Plan/Goals: Reviewed  (Yes)  Goals     None          Follow up: Follow-up in about 6 months (around 8/8/2018).    After visit summary was printed and given to patient upon discharge today.  Patient goals and care plan are included in After Visit Summary.

## 2018-02-09 ENCOUNTER — LAB VISIT (OUTPATIENT)
Dept: LAB | Facility: HOSPITAL | Age: 63
End: 2018-02-09
Payer: COMMERCIAL

## 2018-02-09 DIAGNOSIS — D69.6 THROMBOCYTOPENIA: ICD-10-CM

## 2018-02-09 DIAGNOSIS — Z76.89 ESTABLISHING CARE WITH NEW DOCTOR, ENCOUNTER FOR: ICD-10-CM

## 2018-02-09 DIAGNOSIS — E78.5 HYPERLIPIDEMIA, UNSPECIFIED HYPERLIPIDEMIA TYPE: ICD-10-CM

## 2018-02-09 LAB
ALBUMIN SERPL BCP-MCNC: 3.6 G/DL
ALP SERPL-CCNC: 142 U/L
ALT SERPL W/O P-5'-P-CCNC: 52 U/L
ANION GAP SERPL CALC-SCNC: 9 MMOL/L
AST SERPL-CCNC: 19 U/L
BASOPHILS # BLD AUTO: 0.15 K/UL
BASOPHILS NFR BLD: 0.8 %
BILIRUB SERPL-MCNC: 0.5 MG/DL
BUN SERPL-MCNC: 19 MG/DL
CALCIUM SERPL-MCNC: 9.6 MG/DL
CHLORIDE SERPL-SCNC: 107 MMOL/L
CHOLEST SERPL-MCNC: 171 MG/DL
CHOLEST/HDLC SERPL: 2.6 {RATIO}
CO2 SERPL-SCNC: 30 MMOL/L
CREAT SERPL-MCNC: 0.8 MG/DL
DIFFERENTIAL METHOD: ABNORMAL
EOSINOPHIL # BLD AUTO: 0.2 K/UL
EOSINOPHIL NFR BLD: 1.3 %
ERYTHROCYTE [DISTWIDTH] IN BLOOD BY AUTOMATED COUNT: 14.7 %
EST. GFR  (AFRICAN AMERICAN): >60 ML/MIN/1.73 M^2
EST. GFR  (NON AFRICAN AMERICAN): >60 ML/MIN/1.73 M^2
GLUCOSE SERPL-MCNC: 95 MG/DL
HCT VFR BLD AUTO: 40.4 %
HDLC SERPL-MCNC: 66 MG/DL
HDLC SERPL: 38.6 %
HGB BLD-MCNC: 12.5 G/DL
IMM GRANULOCYTES # BLD AUTO: 0.15 K/UL
IMM GRANULOCYTES NFR BLD AUTO: 0.8 %
LDLC SERPL CALC-MCNC: 97 MG/DL
LYMPHOCYTES # BLD AUTO: 3.8 K/UL
LYMPHOCYTES NFR BLD: 19.7 %
MCH RBC QN AUTO: 28.2 PG
MCHC RBC AUTO-ENTMCNC: 30.9 G/DL
MCV RBC AUTO: 91 FL
MONOCYTES # BLD AUTO: 0.8 K/UL
MONOCYTES NFR BLD: 4 %
NEUTROPHILS # BLD AUTO: 14.1 K/UL
NEUTROPHILS NFR BLD: 73.4 %
NONHDLC SERPL-MCNC: 105 MG/DL
NRBC BLD-RTO: 0 /100 WBC
PLATELET # BLD AUTO: 333 K/UL
PMV BLD AUTO: 10.6 FL
POTASSIUM SERPL-SCNC: 4.3 MMOL/L
PROT SERPL-MCNC: 6.9 G/DL
RBC # BLD AUTO: 4.44 M/UL
SODIUM SERPL-SCNC: 146 MMOL/L
TRIGL SERPL-MCNC: 40 MG/DL
WBC # BLD AUTO: 19.19 K/UL

## 2018-02-09 PROCEDURE — 80061 LIPID PANEL: CPT

## 2018-02-09 PROCEDURE — 36415 COLL VENOUS BLD VENIPUNCTURE: CPT

## 2018-02-09 PROCEDURE — 86803 HEPATITIS C AB TEST: CPT

## 2018-02-09 PROCEDURE — 85025 COMPLETE CBC W/AUTO DIFF WBC: CPT

## 2018-02-09 PROCEDURE — 80053 COMPREHEN METABOLIC PANEL: CPT

## 2018-02-12 DIAGNOSIS — R74.8 ELEVATED ALKALINE PHOSPHATASE LEVEL: Primary | ICD-10-CM

## 2018-02-12 LAB — HCV AB SERPL QL IA: NEGATIVE

## 2018-03-09 ENCOUNTER — HOSPITAL ENCOUNTER (OUTPATIENT)
Dept: RADIOLOGY | Facility: HOSPITAL | Age: 63
Discharge: HOME | End: 2018-03-09
Attending: FAMILY MEDICINE
Payer: COMMERCIAL

## 2018-03-09 DIAGNOSIS — Z12.31 SCREENING MAMMOGRAM, ENCOUNTER FOR: ICD-10-CM

## 2018-03-09 PROCEDURE — 77067 SCR MAMMO BI INCL CAD: CPT | Mod: 26,,, | Performed by: RADIOLOGY

## 2018-03-09 PROCEDURE — 77067 SCR MAMMO BI INCL CAD: CPT | Mod: TC

## 2018-07-18 NOTE — PROGRESS NOTES
Casie Frias Givens  07/19/2018  [id number]    Rossana Ang MD  Patient Care Team:  Rossana Ang MD as PCP - General (Family Medicine)  Has the patient seen any provider outside of the [facility] network since the last visit? (no). If yes, HIPPA forms completed and records requested.        Visit Type:an urgent visit for a new problem    Chief Complaint:  Chief Complaint   Patient presents with    Neck Pain     started last week. starts behing her ear and works down her shoulder    Emesis     started tuesday    Back Pain    Headache       History of Present Illness:  Patient is here for acute neck pain and vomiting.  She reports she is having a lot of stress and work related stress. She thinks its contributing to her symptoms.   She feels her boss is negative to her. She reports that she is manifesting her stress with neck pain and tension.  She does report her stress interupts her sleep.   Neck pain is on right side, radiates into her shoulder. She reports when she got stressed and she did vomit with her stress. She does get some quesy feeling all of a sudden. She things that its trigger by her stress. She does not have any diarrhea.     She has history of elevated LFTs, which were suspected to be from medication effect.  She was to recheck these labs, but did not complete in Feb.     She has history of HTN controlled, on medication.    She is on cholesterol medication.   Lab Results   Component Value Date    CHOL 171 02/09/2018     Lab Results   Component Value Date    HDL 66 02/09/2018     Lab Results   Component Value Date    LDLCALC 97.0 02/09/2018     Lab Results   Component Value Date    TRIG 40 02/09/2018     Lab Results   Component Value Date    CHOLHDL 38.6 02/09/2018              History:  Past Medical History:   Diagnosis Date    Anemia     Anxiety     Arthritis     knees    CHF (congestive heart failure)     Hyperlipemia     Hypertension     Neck pain      Past Surgical History:    Procedure Laterality Date     SECTION      X 1    DILATION AND CURETTAGE OF UTERUS      HYSTERECTOMY      RALH for fibroids (still has ovaries)    TUBAL LIGATION       Family History   Problem Relation Age of Onset    Anesthesia problems Other     Breast cancer Maternal Aunt     Breast cancer Paternal Aunt     Ovarian cancer Paternal Aunt     Colon cancer Brother     Thrombophilia Neg Hx      Social History     Social History    Marital status:      Spouse name: N/A    Number of children: N/A    Years of education: N/A     Occupational History    Not on file.     Social History Main Topics    Smoking status: Former Smoker     Quit date: 1986    Smokeless tobacco: Never Used      Comment: States started quit 2 months ago after 30 years    Alcohol use 0.0 oz/week      Comment: occasionally    Drug use: No    Sexual activity: Yes     Partners: Male      Comment: hyst; mut monog     Other Topics Concern    Not on file     Social History Narrative    No narrative on file     Patient Active Problem List   Diagnosis    Hypertension    Osteoarthritis of both knees    History of CHF (congestive heart failure)    LIZANDRO (stress urinary incontinence, female)    History of total hysterectomy with bilateral salpingo-oophorectomy (BSO)    Hyperlipidemia     Review of patient's allergies indicates:  No Known Allergies    The following were reviewed at this visit: active problem list, medication list, allergies, family history, social history, and health maintenance.    Medications:  Current Outpatient Prescriptions on File Prior to Visit   Medication Sig Dispense Refill    albuterol 90 mcg/actuation inhaler Inhale 2 puffs into the lungs every 4 (four) hours as needed for Wheezing. Rescue 18 g 0    multivitamin with minerals tablet Take by mouth.      olmesartan-hydrochlorothiazide (BENICAR HCT) 40-12.5 mg Tab Take 1 tablet by mouth once daily. 90 tablet 1    rosuvastatin (CRESTOR)  10 MG tablet Take 1 tablet (10 mg total) by mouth once daily. 90 tablet 1    azithromycin (Z-ROSE) 250 MG tablet Take 1 tablet (250 mg total) by mouth once daily. Take first 2 tablets together, then 1 every day until finished. 6 tablet 0     No current facility-administered medications on file prior to visit.        Medications have been reviewed and reconciled with patient at this visit.  Barriers to medications present (no)    Adverse reactions to current medications (no)    Over the counter medications reviewed (Yes ), and if needed added to active Medication list at this visit.     Exam:  Wt Readings from Last 3 Encounters:   07/19/18 110.5 kg (243 lb 9.7 oz)   02/08/18 108.9 kg (240 lb)   02/02/18 105.7 kg (233 lb)     Temp Readings from Last 3 Encounters:   07/19/18 96 °F (35.6 °C) (Tympanic)   02/08/18 96.5 °F (35.8 °C) (Tympanic)   02/02/18 97.6 °F (36.4 °C) (Tympanic)     BP Readings from Last 3 Encounters:   07/19/18 124/86   02/08/18 136/82   02/02/18 130/80     Pulse Readings from Last 3 Encounters:   07/19/18 71   02/08/18 78   02/02/18 84     Body mass index is 38.14 kg/m².    Review of Systems   Constitutional: Negative.  Negative for chills and fever.   HENT: Negative.  Negative for congestion, sinus pain and sore throat.    Eyes: Negative for blurred vision and double vision.   Respiratory: Negative for cough, sputum production, shortness of breath and wheezing.    Cardiovascular: Negative for chest pain, palpitations and leg swelling.   Gastrointestinal: Positive for nausea and vomiting. Negative for abdominal pain, constipation, diarrhea and heartburn.   Genitourinary: Negative.    Musculoskeletal: Positive for neck pain.   Skin: Negative.  Negative for rash.   Neurological: Negative.    Endo/Heme/Allergies: Negative.  Negative for polydipsia. Does not bruise/bleed easily.   Psychiatric/Behavioral: Negative for depression and substance abuse.         Physical Exam   Constitutional: She is oriented  to person, place, and time. She appears well-developed and well-nourished. No distress.   HENT:   Head: Normocephalic and atraumatic.   Right Ear: External ear normal.   Left Ear: External ear normal.   Nose: Nose normal.   Mouth/Throat: Oropharynx is clear and moist. No oropharyngeal exudate.   Eyes: Conjunctivae and EOM are normal. Pupils are equal, round, and reactive to light. Right eye exhibits no discharge. Left eye exhibits no discharge.   Neck: Normal range of motion. Neck supple. No thyromegaly present.   Cardiovascular: Normal rate, regular rhythm, normal heart sounds and intact distal pulses.    No murmur heard.  Pulmonary/Chest: Effort normal and breath sounds normal. No respiratory distress. She has no wheezes.   Abdominal: Soft. Bowel sounds are normal. She exhibits no distension and no mass. There is no tenderness.   Musculoskeletal: Normal range of motion. She exhibits no edema.   Lymphadenopathy:     She has no cervical adenopathy.   Neurological: She is alert and oriented to person, place, and time. No cranial nerve deficit.   Skin: Capillary refill takes less than 2 seconds. She is not diaphoretic.   Psychiatric: She has a normal mood and affect. Her behavior is normal. Judgment and thought content normal.   Nursing note and vitals reviewed.      Laboratory Reviewed ({Yes)  Lab Results   Component Value Date    WBC 19.19 (H) 02/09/2018    HGB 12.5 02/09/2018    HCT 40.4 02/09/2018     02/09/2018    CHOL 171 02/09/2018    TRIG 40 02/09/2018    HDL 66 02/09/2018    ALT 52 (H) 02/09/2018    AST 19 02/09/2018     (H) 02/09/2018    K 4.3 02/09/2018     02/09/2018    CREATININE 0.8 02/09/2018    BUN 19 02/09/2018    CO2 30 (H) 02/09/2018    TSH 1.869 04/19/2013       Assessment:  The primary encounter diagnosis was Stress reaction. Diagnoses of Vomiting, intractability of vomiting not specified, presence of nausea not specified, unspecified vomiting type and Elevated alkaline  phosphatase level were also pertinent to this visit.     Plan     Stress reaction   Counseling given that sx related to stress   Discussed that sx are caused    Vomiting, intractability of vomiting not specified, presence of nausea not specified, unspecified vomiting type   Prilosec prn   Labs done    Elevated alkaline phosphatase level   CMP rechecked        Care Plan/Goals: Reviewed  (N/A)  Goals     None          Follow up: No Follow-up on file.    After visit summary was printed and given to patient upon discharge today.  Patient goals and care plan are included in After Visit Summary.

## 2018-07-19 ENCOUNTER — LAB VISIT (OUTPATIENT)
Dept: LAB | Facility: HOSPITAL | Age: 63
End: 2018-07-19
Payer: COMMERCIAL

## 2018-07-19 ENCOUNTER — OFFICE VISIT (OUTPATIENT)
Dept: INTERNAL MEDICINE | Facility: CLINIC | Age: 63
End: 2018-07-19
Payer: COMMERCIAL

## 2018-07-19 VITALS
SYSTOLIC BLOOD PRESSURE: 124 MMHG | DIASTOLIC BLOOD PRESSURE: 86 MMHG | BODY MASS INDEX: 38.24 KG/M2 | WEIGHT: 243.63 LBS | HEIGHT: 67 IN | HEART RATE: 71 BPM | OXYGEN SATURATION: 96 % | TEMPERATURE: 96 F

## 2018-07-19 DIAGNOSIS — R74.8 ELEVATED ALKALINE PHOSPHATASE LEVEL: ICD-10-CM

## 2018-07-19 DIAGNOSIS — R11.10 VOMITING, INTRACTABILITY OF VOMITING NOT SPECIFIED, PRESENCE OF NAUSEA NOT SPECIFIED, UNSPECIFIED VOMITING TYPE: ICD-10-CM

## 2018-07-19 DIAGNOSIS — F43.0 STRESS REACTION: Primary | ICD-10-CM

## 2018-07-19 LAB
ALBUMIN SERPL BCP-MCNC: 3.7 G/DL
ALP SERPL-CCNC: 136 U/L
ALT SERPL W/O P-5'-P-CCNC: 26 U/L
ANION GAP SERPL CALC-SCNC: 7 MMOL/L
AST SERPL-CCNC: 22 U/L
BILIRUB SERPL-MCNC: 0.3 MG/DL
BUN SERPL-MCNC: 12 MG/DL
CALCIUM SERPL-MCNC: 9.4 MG/DL
CHLORIDE SERPL-SCNC: 109 MMOL/L
CO2 SERPL-SCNC: 29 MMOL/L
CREAT SERPL-MCNC: 0.8 MG/DL
EST. GFR  (AFRICAN AMERICAN): >60 ML/MIN/1.73 M^2
EST. GFR  (NON AFRICAN AMERICAN): >60 ML/MIN/1.73 M^2
GLUCOSE SERPL-MCNC: 101 MG/DL
POTASSIUM SERPL-SCNC: 4.4 MMOL/L
PROT SERPL-MCNC: 7 G/DL
SODIUM SERPL-SCNC: 145 MMOL/L

## 2018-07-19 PROCEDURE — 99999 PR PBB SHADOW E&M-EST. PATIENT-LVL III: CPT | Mod: PBBFAC,,, | Performed by: FAMILY MEDICINE

## 2018-07-19 PROCEDURE — 36415 COLL VENOUS BLD VENIPUNCTURE: CPT

## 2018-07-19 PROCEDURE — 80053 COMPREHEN METABOLIC PANEL: CPT

## 2018-07-19 PROCEDURE — 99213 OFFICE O/P EST LOW 20 MIN: CPT | Mod: S$GLB,,, | Performed by: FAMILY MEDICINE

## 2018-07-19 PROCEDURE — 3008F BODY MASS INDEX DOCD: CPT | Mod: CPTII,S$GLB,, | Performed by: FAMILY MEDICINE

## 2018-07-19 PROCEDURE — 3079F DIAST BP 80-89 MM HG: CPT | Mod: CPTII,S$GLB,, | Performed by: FAMILY MEDICINE

## 2018-07-19 PROCEDURE — 3074F SYST BP LT 130 MM HG: CPT | Mod: CPTII,S$GLB,, | Performed by: FAMILY MEDICINE

## 2018-07-19 NOTE — LETTER
July 19, 2018      [facility]  [id number] [place] [id number]  Phone: [phone]  Fax: [phone]       Patient: Casie Gaines   YOB: 1955  Date of Visit: 07/19/2018    To Whom It May Concern:    Andrés Gaines  was at [facility] on 07/19/2018. She may return to work/school on 07/20/2018 with restrictions. She will be excused from 7/17/2018 through 7/19/2018.  If you have any questions or concerns, or if I can be of further assistance, please do not hesitate to contact me.    Sincerely,    Rossana Ang MD

## 2018-07-20 NOTE — PROGRESS NOTES
Notify patient  Alk phos almost normal range, normal 135, it 136.  All other labs are normal.    I will just monitor for now

## 2018-07-25 ENCOUNTER — PATIENT OUTREACH (OUTPATIENT)
Dept: ADMINISTRATIVE | Facility: HOSPITAL | Age: 63
End: 2018-07-25

## 2018-12-20 ENCOUNTER — OFFICE VISIT (OUTPATIENT)
Dept: OBSTETRICS AND GYNECOLOGY | Facility: CLINIC | Age: 63
End: 2018-12-20
Payer: COMMERCIAL

## 2018-12-20 VITALS
DIASTOLIC BLOOD PRESSURE: 82 MMHG | WEIGHT: 246.69 LBS | HEIGHT: 67 IN | BODY MASS INDEX: 38.72 KG/M2 | SYSTOLIC BLOOD PRESSURE: 138 MMHG

## 2018-12-20 DIAGNOSIS — N30.00 ACUTE CYSTITIS WITHOUT HEMATURIA: Primary | ICD-10-CM

## 2018-12-20 DIAGNOSIS — N89.8 VAGINAL ODOR: ICD-10-CM

## 2018-12-20 PROCEDURE — 99202 OFFICE O/P NEW SF 15 MIN: CPT | Mod: S$GLB,,, | Performed by: NURSE PRACTITIONER

## 2018-12-20 PROCEDURE — 3079F DIAST BP 80-89 MM HG: CPT | Mod: CPTII,S$GLB,, | Performed by: NURSE PRACTITIONER

## 2018-12-20 PROCEDURE — 3075F SYST BP GE 130 - 139MM HG: CPT | Mod: CPTII,S$GLB,, | Performed by: NURSE PRACTITIONER

## 2018-12-20 PROCEDURE — 87086 URINE CULTURE/COLONY COUNT: CPT

## 2018-12-20 PROCEDURE — 3008F BODY MASS INDEX DOCD: CPT | Mod: CPTII,S$GLB,, | Performed by: NURSE PRACTITIONER

## 2018-12-20 PROCEDURE — 99999 PR PBB SHADOW E&M-EST. PATIENT-LVL III: CPT | Mod: PBBFAC,,, | Performed by: NURSE PRACTITIONER

## 2018-12-20 PROCEDURE — 87660 TRICHOMONAS VAGIN DIR PROBE: CPT

## 2018-12-20 RX ORDER — NITROFURANTOIN 25; 75 MG/1; MG/1
100 CAPSULE ORAL 2 TIMES DAILY
Qty: 14 CAPSULE | Refills: 0 | Status: SHIPPED | OUTPATIENT
Start: 2018-12-20 | End: 2018-12-27

## 2018-12-20 NOTE — PROGRESS NOTES
CC: Urine and vaginal odor    Casie Gaines is a 63 y.o. female  presents for urine and vaginal odor.  No dysuria. Mild pelvic pain. Urine in clinic indicated Nitrates.     Past Medical History:   Diagnosis Date    Anemia     Anxiety     Arthritis     knees    CHF (congestive heart failure)     Hyperlipemia     Hypertension     Neck pain      Past Surgical History:   Procedure Laterality Date    breast reduction  10/02/2018     SECTION      X 1    DILATION AND CURETTAGE OF UTERUS      HYSTERECTOMY      RALH for fibroids (still has ovaries)    PLACEMENT, TRANSOBTURATOR TAPE N/A 2014    Performed by [facility] [place]    ROBOTIC HYSTERECTOMY N/A 2014    [facility] [place]    TUBAL LIGATION       Social History     Socioeconomic History    Marital status:      Spouse name: Not on file    Number of children: Not on file    Years of education: Not on file    Highest education level: Not on file   Social Needs    Financial resource strain: Not on file    Food insecurity - worry: Not on file    Food insecurity - inability: Not on file    Transportation needs - medical: Not on file    Transportation needs - non-medical: Not on file   Occupational History    Not on file   Tobacco Use    Smoking status: Former Smoker     Last attempt to quit: 10/1/2018     Years since quittin.2    Smokeless tobacco: Never Used    Tobacco comment: States started quit 2 months ago after 30 years   Substance and Sexual Activity    Alcohol use: No     Alcohol/week: 0.0 oz     Frequency: Never     Comment: occasionally    Drug use: No    Sexual activity: Not Currently     Partners: Male     Comment: hyst; mut monog   Other Topics Concern    Not on file   Social History Narrative    Not on file     Family History   Problem Relation Age of Onset    Anesthesia problems Other     Breast cancer Maternal Aunt     Breast cancer  "Paternal Aunt     Ovarian cancer Paternal Aunt     Colon cancer Brother     Thrombophilia Neg Hx      OB History      Para Term  AB Living    4 4 3 1   3    SAB TAB Ectopic Multiple Live Births          1 4        Obstetric Comments    Male twin  2015          /82   Ht 5' 7" (1.702 m)   Wt 111.9 kg (246 lb 11.1 oz)   BMI 38.64 kg/m²       ROS:  GENERAL: Denies weight gain or weight loss. Feeling well overall.   CHEST: Denies chest pain or shortness of breath.   CARDIOVASCULAR: Denies palpitations or left sided chest pain.   ABDOMEN: No abdominal pain, constipation, diarrhea, nausea, vomiting or rectal bleeding.   URINARY: HPI  REPRODUCTIVE: See HPI.       PHYSICAL EXAM:  APPEARANCE: Obese female, in no acute distress.  AFFECT: WNL, alert and oriented x 3  ABDOMEN: Soft.  No tenderness or masses.    PELVIC: Normal external genitalia without lesions.  Vagina atrophy, scant white discharge.     1. Acute cystitis without hematuria  Urine culture   2. Vaginal odor  Vaginosis Screen by DNA Probe    PLAN:  Urine and Affirm cx  Marobid rx            "

## 2018-12-21 ENCOUNTER — TELEPHONE (OUTPATIENT)
Dept: OBSTETRICS AND GYNECOLOGY | Facility: CLINIC | Age: 63
End: 2018-12-21

## 2018-12-21 LAB
CANDIDA RRNA VAG QL PROBE: NEGATIVE
G VAGINALIS RRNA GENITAL QL PROBE: NEGATIVE
T VAGINALIS RRNA GENITAL QL PROBE: NEGATIVE

## 2018-12-21 NOTE — TELEPHONE ENCOUNTER
----- Message from Shakira Hodge NP sent at 12/21/2018  7:39 AM CST -----  Please call patient and inform vaginal cx was negative.

## 2018-12-22 LAB — BACTERIA UR CULT: NORMAL

## 2019-01-25 ENCOUNTER — HOSPITAL ENCOUNTER (INPATIENT)
Facility: HOSPITAL | Age: 64
LOS: 2 days | Discharge: HOME | DRG: 988 | End: 2019-01-30
Attending: EMERGENCY MEDICINE | Admitting: INTERNAL MEDICINE
Payer: COMMERCIAL

## 2019-01-25 DIAGNOSIS — R19.00 ABDOMINAL MASS, UNSPECIFIED ABDOMINAL LOCATION: ICD-10-CM

## 2019-01-25 DIAGNOSIS — R06.02 SHORTNESS OF BREATH: ICD-10-CM

## 2019-01-25 DIAGNOSIS — N13.39 OTHER HYDRONEPHROSIS: ICD-10-CM

## 2019-01-25 DIAGNOSIS — N30.00 ACUTE CYSTITIS WITHOUT HEMATURIA: Primary | ICD-10-CM

## 2019-01-25 DIAGNOSIS — J90 PLEURAL EFFUSION ON RIGHT: ICD-10-CM

## 2019-01-25 DIAGNOSIS — C78.6 PERITONEAL CARCINOMATOSIS: ICD-10-CM

## 2019-01-25 DIAGNOSIS — I10 ESSENTIAL HYPERTENSION: ICD-10-CM

## 2019-01-25 DIAGNOSIS — J90 PLEURAL EFFUSION: ICD-10-CM

## 2019-01-25 DIAGNOSIS — R19.00 PELVIC MASS: ICD-10-CM

## 2019-01-25 LAB
ALBUMIN SERPL BCP-MCNC: 3.5 G/DL
ALP SERPL-CCNC: 122 U/L
ALT SERPL W/O P-5'-P-CCNC: 40 U/L
ANION GAP SERPL CALC-SCNC: 9 MMOL/L
AST SERPL-CCNC: 40 U/L
BASOPHILS # BLD AUTO: 0.05 K/UL
BASOPHILS NFR BLD: 0.4 %
BILIRUB SERPL-MCNC: 0.8 MG/DL
BUN SERPL-MCNC: 12 MG/DL
CALCIUM SERPL-MCNC: 9.3 MG/DL
CHLORIDE SERPL-SCNC: 106 MMOL/L
CO2 SERPL-SCNC: 26 MMOL/L
CREAT SERPL-MCNC: 0.8 MG/DL
DIFFERENTIAL METHOD: ABNORMAL
EOSINOPHIL # BLD AUTO: 0.1 K/UL
EOSINOPHIL NFR BLD: 0.7 %
ERYTHROCYTE [DISTWIDTH] IN BLOOD BY AUTOMATED COUNT: 15 %
EST. GFR  (AFRICAN AMERICAN): >60 ML/MIN/1.73 M^2
EST. GFR  (NON AFRICAN AMERICAN): >60 ML/MIN/1.73 M^2
GLUCOSE SERPL-MCNC: 94 MG/DL
HCT VFR BLD AUTO: 37.5 %
HGB BLD-MCNC: 12 G/DL
LACTATE SERPL-SCNC: 0.9 MMOL/L
LYMPHOCYTES # BLD AUTO: 2 K/UL
LYMPHOCYTES NFR BLD: 14.5 %
MCH RBC QN AUTO: 27.1 PG
MCHC RBC AUTO-ENTMCNC: 32 G/DL
MCV RBC AUTO: 85 FL
MONOCYTES # BLD AUTO: 1.2 K/UL
MONOCYTES NFR BLD: 8.4 %
NEUTROPHILS # BLD AUTO: 10.5 K/UL
NEUTROPHILS NFR BLD: 76 %
PLATELET # BLD AUTO: 317 K/UL
PMV BLD AUTO: 9.9 FL
POTASSIUM SERPL-SCNC: 3.7 MMOL/L
PROT SERPL-MCNC: 7 G/DL
RBC # BLD AUTO: 4.43 M/UL
SODIUM SERPL-SCNC: 141 MMOL/L
WBC # BLD AUTO: 13.82 K/UL

## 2019-01-25 PROCEDURE — 87088 URINE BACTERIA CULTURE: CPT

## 2019-01-25 PROCEDURE — 84484 ASSAY OF TROPONIN QUANT: CPT

## 2019-01-25 PROCEDURE — 85025 COMPLETE CBC W/AUTO DIFF WBC: CPT

## 2019-01-25 PROCEDURE — 99285 EMERGENCY DEPT VISIT HI MDM: CPT | Mod: 25

## 2019-01-25 PROCEDURE — 87186 SC STD MICRODIL/AGAR DIL: CPT

## 2019-01-25 PROCEDURE — 96361 HYDRATE IV INFUSION ADD-ON: CPT

## 2019-01-25 PROCEDURE — 87077 CULTURE AEROBIC IDENTIFY: CPT

## 2019-01-25 PROCEDURE — 96367 TX/PROPH/DG ADDL SEQ IV INF: CPT

## 2019-01-25 PROCEDURE — 96365 THER/PROPH/DIAG IV INF INIT: CPT

## 2019-01-25 PROCEDURE — 83880 ASSAY OF NATRIURETIC PEPTIDE: CPT

## 2019-01-25 PROCEDURE — 81000 URINALYSIS NONAUTO W/SCOPE: CPT

## 2019-01-25 PROCEDURE — 83605 ASSAY OF LACTIC ACID: CPT

## 2019-01-25 PROCEDURE — 93010 EKG 12-LEAD: ICD-10-PCS | Mod: ,,, | Performed by: INTERNAL MEDICINE

## 2019-01-25 PROCEDURE — 80053 COMPREHEN METABOLIC PANEL: CPT

## 2019-01-25 PROCEDURE — 87086 URINE CULTURE/COLONY COUNT: CPT

## 2019-01-25 PROCEDURE — 93010 ELECTROCARDIOGRAM REPORT: CPT | Mod: ,,, | Performed by: INTERNAL MEDICINE

## 2019-01-25 RX ORDER — ZINC GLUCONATE 50 MG
50 TABLET ORAL DAILY
COMMUNITY

## 2019-01-26 PROBLEM — J90 PLEURAL EFFUSION: Status: ACTIVE | Noted: 2019-01-26

## 2019-01-26 PROBLEM — N39.0 UTI (URINARY TRACT INFECTION): Status: ACTIVE | Noted: 2019-01-26

## 2019-01-26 PROBLEM — R19.00 ABDOMINAL MASS: Status: ACTIVE | Noted: 2019-01-26

## 2019-01-26 PROBLEM — C48.2 PRIMARY PERITONEAL CARCINOMATOSIS: Status: ACTIVE | Noted: 2019-01-26

## 2019-01-26 PROBLEM — E66.01 MORBID OBESITY: Status: ACTIVE | Noted: 2019-01-26

## 2019-01-26 LAB
BACTERIA #/AREA URNS HPF: ABNORMAL /HPF
BILIRUB UR QL STRIP: NEGATIVE
BNP SERPL-MCNC: 11 PG/ML
CLARITY UR: CLEAR
COLOR UR: YELLOW
GLUCOSE UR QL STRIP: NEGATIVE
HGB UR QL STRIP: ABNORMAL
KETONES UR QL STRIP: NEGATIVE
LEUKOCYTE ESTERASE UR QL STRIP: ABNORMAL
MICROSCOPIC COMMENT: ABNORMAL
NITRITE UR QL STRIP: POSITIVE
PH UR STRIP: 8 [PH] (ref 5–8)
PROT UR QL STRIP: ABNORMAL
RBC #/AREA URNS HPF: 5 /HPF (ref 0–4)
SP GR UR STRIP: 1.01 (ref 1–1.03)
SQUAMOUS #/AREA URNS HPF: 5 /HPF
TROPONIN I SERPL DL<=0.01 NG/ML-MCNC: <0.006 NG/ML
URN SPEC COLLECT METH UR: ABNORMAL
UROBILINOGEN UR STRIP-ACNC: ABNORMAL EU/DL
WBC #/AREA URNS HPF: >100 /HPF (ref 0–5)

## 2019-01-26 PROCEDURE — 99205 PR OFFICE/OUTPT VISIT, NEW, LEVL V, 60-74 MIN: ICD-10-PCS | Mod: ,,, | Performed by: INTERNAL MEDICINE

## 2019-01-26 PROCEDURE — 63600175 PHARM REV CODE 636 W HCPCS: Performed by: INTERNAL MEDICINE

## 2019-01-26 PROCEDURE — 25000003 PHARM REV CODE 250: Performed by: INTERNAL MEDICINE

## 2019-01-26 PROCEDURE — 86304 IMMUNOASSAY TUMOR CA 125: CPT

## 2019-01-26 PROCEDURE — 25500020 PHARM REV CODE 255: Performed by: EMERGENCY MEDICINE

## 2019-01-26 PROCEDURE — 99204 PR OFFICE/OUTPT VISIT, NEW, LEVL IV, 45-59 MIN: ICD-10-PCS | Mod: ,,, | Performed by: INTERNAL MEDICINE

## 2019-01-26 PROCEDURE — 25000003 PHARM REV CODE 250: Performed by: EMERGENCY MEDICINE

## 2019-01-26 PROCEDURE — 25000003 PHARM REV CODE 250: Performed by: FAMILY MEDICINE

## 2019-01-26 PROCEDURE — 36415 COLL VENOUS BLD VENIPUNCTURE: CPT

## 2019-01-26 PROCEDURE — 99205 OFFICE O/P NEW HI 60 MIN: CPT | Mod: ,,, | Performed by: INTERNAL MEDICINE

## 2019-01-26 PROCEDURE — G0378 HOSPITAL OBSERVATION PER HR: HCPCS

## 2019-01-26 PROCEDURE — 82378 CARCINOEMBRYONIC ANTIGEN: CPT

## 2019-01-26 PROCEDURE — 87040 BLOOD CULTURE FOR BACTERIA: CPT

## 2019-01-26 PROCEDURE — 86301 IMMUNOASSAY TUMOR CA 19-9: CPT

## 2019-01-26 PROCEDURE — 99204 OFFICE O/P NEW MOD 45 MIN: CPT | Mod: ,,, | Performed by: INTERNAL MEDICINE

## 2019-01-26 PROCEDURE — 63600175 PHARM REV CODE 636 W HCPCS: Performed by: EMERGENCY MEDICINE

## 2019-01-26 PROCEDURE — 96376 TX/PRO/DX INJ SAME DRUG ADON: CPT

## 2019-01-26 RX ORDER — HYDRALAZINE HYDROCHLORIDE 20 MG/ML
10 INJECTION INTRAMUSCULAR; INTRAVENOUS EVERY 6 HOURS PRN
Status: DISCONTINUED | OUTPATIENT
Start: 2019-01-26 | End: 2019-01-30 | Stop reason: HOSPADM

## 2019-01-26 RX ORDER — GUAIFENESIN 100 MG/5ML
200 SOLUTION ORAL EVERY 4 HOURS PRN
Status: DISCONTINUED | OUTPATIENT
Start: 2019-01-26 | End: 2019-01-30 | Stop reason: HOSPADM

## 2019-01-26 RX ORDER — OXYCODONE AND ACETAMINOPHEN 5; 325 MG/1; MG/1
1 TABLET ORAL EVERY 4 HOURS PRN
Status: DISCONTINUED | OUTPATIENT
Start: 2019-01-26 | End: 2019-01-30 | Stop reason: HOSPADM

## 2019-01-26 RX ORDER — ROSUVASTATIN CALCIUM 10 MG/1
10 TABLET, COATED ORAL DAILY
Status: DISCONTINUED | OUTPATIENT
Start: 2019-01-26 | End: 2019-01-30 | Stop reason: HOSPADM

## 2019-01-26 RX ORDER — ACETAMINOPHEN 325 MG/1
650 TABLET ORAL EVERY 6 HOURS PRN
Status: DISCONTINUED | OUTPATIENT
Start: 2019-01-26 | End: 2019-01-30 | Stop reason: HOSPADM

## 2019-01-26 RX ORDER — OLMESARTAN MEDOXOMIL AND HYDROCHLOROTHIAZIDE 40/12.5 40; 12.5 MG/1; MG/1
1 TABLET ORAL DAILY
Status: DISCONTINUED | OUTPATIENT
Start: 2019-01-26 | End: 2019-01-26 | Stop reason: CLARIF

## 2019-01-26 RX ORDER — PANTOPRAZOLE SODIUM 40 MG/1
40 TABLET, DELAYED RELEASE ORAL DAILY
Status: DISCONTINUED | OUTPATIENT
Start: 2019-01-26 | End: 2019-01-30 | Stop reason: HOSPADM

## 2019-01-26 RX ORDER — CLONIDINE HYDROCHLORIDE 0.1 MG/1
0.1 TABLET ORAL EVERY 4 HOURS PRN
Status: DISCONTINUED | OUTPATIENT
Start: 2019-01-26 | End: 2019-01-30 | Stop reason: HOSPADM

## 2019-01-26 RX ORDER — MAG HYDROX/ALUMINUM HYD/SIMETH 200-200-20
30 SUSPENSION, ORAL (FINAL DOSE FORM) ORAL EVERY 6 HOURS PRN
Status: DISCONTINUED | OUTPATIENT
Start: 2019-01-26 | End: 2019-01-30 | Stop reason: HOSPADM

## 2019-01-26 RX ORDER — CANDESARTAN 16 MG/1
16 TABLET ORAL 2 TIMES DAILY
Status: DISCONTINUED | OUTPATIENT
Start: 2019-01-26 | End: 2019-01-30 | Stop reason: HOSPADM

## 2019-01-26 RX ORDER — LIDOCAINE HYDROCHLORIDE 10 MG/ML
10 INJECTION, SOLUTION EPIDURAL; INFILTRATION; INTRACAUDAL; PERINEURAL
Status: COMPLETED | OUTPATIENT
Start: 2019-01-26 | End: 2019-01-26

## 2019-01-26 RX ORDER — HYDROCHLOROTHIAZIDE 12.5 MG/1
12.5 TABLET ORAL DAILY
Status: DISCONTINUED | OUTPATIENT
Start: 2019-01-26 | End: 2019-01-30 | Stop reason: HOSPADM

## 2019-01-26 RX ORDER — OXYCODONE AND ACETAMINOPHEN 10; 325 MG/1; MG/1
1 TABLET ORAL EVERY 4 HOURS PRN
Status: DISCONTINUED | OUTPATIENT
Start: 2019-01-26 | End: 2019-01-30 | Stop reason: HOSPADM

## 2019-01-26 RX ORDER — DIPHENHYDRAMINE HCL 25 MG
25 CAPSULE ORAL EVERY 6 HOURS PRN
Status: DISCONTINUED | OUTPATIENT
Start: 2019-01-26 | End: 2019-01-30 | Stop reason: HOSPADM

## 2019-01-26 RX ORDER — IPRATROPIUM BROMIDE AND ALBUTEROL SULFATE 2.5; .5 MG/3ML; MG/3ML
3 SOLUTION RESPIRATORY (INHALATION) EVERY 4 HOURS PRN
Status: DISCONTINUED | OUTPATIENT
Start: 2019-01-26 | End: 2019-01-30 | Stop reason: HOSPADM

## 2019-01-26 RX ORDER — ONDANSETRON 2 MG/ML
4 INJECTION INTRAMUSCULAR; INTRAVENOUS EVERY 8 HOURS PRN
Status: DISCONTINUED | OUTPATIENT
Start: 2019-01-26 | End: 2019-01-30 | Stop reason: HOSPADM

## 2019-01-26 RX ADMIN — PANTOPRAZOLE SODIUM 40 MG: 40 TABLET, DELAYED RELEASE ORAL at 08:01

## 2019-01-26 RX ADMIN — ROSUVASTATIN CALCIUM 10 MG: 10 TABLET, FILM COATED ORAL at 08:01

## 2019-01-26 RX ADMIN — LIDOCAINE HYDROCHLORIDE 100 MG: 10 INJECTION, SOLUTION EPIDURAL; INFILTRATION; INTRACAUDAL; PERINEURAL at 10:01

## 2019-01-26 RX ADMIN — CEFTRIAXONE 2 G: 2 INJECTION, SOLUTION INTRAVENOUS at 07:01

## 2019-01-26 RX ADMIN — CANDESARTAN CILEXETIL 16 MG: 16 TABLET ORAL at 08:01

## 2019-01-26 RX ADMIN — IOHEXOL 75 ML: 350 INJECTION, SOLUTION INTRAVENOUS at 01:01

## 2019-01-26 RX ADMIN — HYDROCHLOROTHIAZIDE 12.5 MG: 12.5 TABLET ORAL at 08:01

## 2019-01-26 RX ADMIN — SODIUM CHLORIDE 1000 ML: 0.9 INJECTION, SOLUTION INTRAVENOUS at 02:01

## 2019-01-26 RX ADMIN — CEFTRIAXONE 2 G: 2 INJECTION, SOLUTION INTRAVENOUS at 12:01

## 2019-01-26 RX ADMIN — ACETAMINOPHEN 650 MG: 325 TABLET ORAL at 03:01

## 2019-01-26 RX ADMIN — AZITHROMYCIN MONOHYDRATE 500 MG: 500 INJECTION, POWDER, LYOPHILIZED, FOR SOLUTION INTRAVENOUS at 02:01

## 2019-01-26 NOTE — CARE UPDATE
Pt seen and examined. Thoracentesis was done 3x but unsuccessful according to pt.     Cont current tx.    Per heme/onc:   Overall imaging consistent with metastatic malignancy/peritoneal carcinomatosis.  We will order tumor markers and plan to do cytology on thoracentesis, however, the patient will need CT-guided biopsy of 1 of the abdominal masses for tissue diagnosis and provide tissue for molecular studies.     --tumor markers  --CT-guided biopsy of 1 of the abdominal masses for tissue diagnosis and molecular studies  --supportive care

## 2019-01-26 NOTE — ED NOTES
Pt sleeping at present.  Awakens easily to verbal stimuli. Pt to be admitted. Pt informed. Pt informed no bed availability until after 7am.  Resting in NAD. Call light in reach. IV meds infusing.  No c/o pain, SOB with exertion and walking to br

## 2019-01-26 NOTE — PROGRESS NOTES
[facility] -   Hematology/Oncology  Progress Note    Patient Name: Casie Gaines  Admission Date: 2019  Hospital Length of Stay: 0 days  Code Status: No Order     Subjective:     HPI:    63-year-old female with a history of CHF, dyslipidemia, hypertension who presented to the emergency room with progressive shortness of breath over the previous month.  She also reports significant abdominal discomfort with slight distention.  2019 CT scan demonstrated numerous soft tissue masses within the left a abdominal cavity consistent with peritoneal carcinomatosis.  The patient has no personal history of malignancy and underwent hysterectomy in 2006 for fibroid uterus.  Imaging also demonstrated large right pleural effusion for which thoracentesis is planned.    Patient has a family history of colon cancer in her brother at age 62 and lung cancer in her brother at age 64  No history of breast or ovarian cancer    Oncology Treatment Plan:   [No treatment plan]    Medications:  Continuous Infusions:  Scheduled Meds:   candesartan  16 mg Oral BID    And    hydroCHLOROthiazide  12.5 mg Oral Daily    cefTRIAXone (ROCEPHIN) IVPB  2 g Intravenous Q24H    pantoprazole  40 mg Oral Daily    rosuvastatin  10 mg Oral Daily     PRN Meds:acetaminophen, albuterol-ipratropium, aluminum-magnesium hydroxide-simethicone, cloNIDine, diphenhydrAMINE, guaifenesin 100 mg/5 ml, hydrALAZINE, ondansetron, oxyCODONE-acetaminophen, oxyCODONE-acetaminophen     Review of patient's allergies indicates:  No Known Allergies     Past Medical History:   Diagnosis Date    Anemia     Anxiety     Arthritis     knees    CHF (congestive heart failure)     Hyperlipemia     Hypertension     Neck pain      Past Surgical History:   Procedure Laterality Date    breast reduction  10/02/2018     SECTION      X 1    DILATION AND CURETTAGE OF UTERUS      HYSTERECTOMY      RALH for fibroids (still has ovaries)     PLACEMENT, TRANSOBTURATOR TAPE N/A 2014    [facility]    ROBOTIC HYSTERECTOMY N/A 2014    Per[facility] by [facility] at [facility] OR    TUBAL LIGATION       Family History     Problem Relation (Age of Onset)    Anesthesia problems Other    Breast cancer Maternal Aunt, Paternal Aunt    Colon cancer Brother    Ovarian cancer Paternal Aunt        Tobacco Use    Smoking status: Former Smoker     Packs/day: 1.00     Years: 25.00     Pack years: 25.00     Last attempt to quit: 10/1/2018     Years since quittin.3    Smokeless tobacco: Never Used    Tobacco comment: States started quit 2 months ago after 30 years   Substance and Sexual Activity    Alcohol use: No     Alcohol/week: 0.0 oz     Frequency: Never     Comment: occasionally    Drug use: No    Sexual activity: Not Currently     Partners: Male     Comment: hyst; mut monog       Review of Systems   Constitutional: Positive for activity change and fatigue. Negative for appetite change, fever and unexpected weight change.   HENT: Negative for congestion, dental problem, ear discharge, facial swelling, mouth sores, nosebleeds, rhinorrhea, sinus pressure, sinus pain, sore throat, trouble swallowing and voice change.    Eyes: Negative.    Respiratory: Positive for shortness of breath (With exertion). Negative for apnea, cough, choking and chest tightness.    Cardiovascular: Negative for chest pain and leg swelling.   Gastrointestinal: Positive for abdominal distention and abdominal pain. Negative for anal bleeding, blood in stool and constipation.   Endocrine: Negative.    Genitourinary: Negative for difficulty urinating, dyspareunia, dysuria, flank pain, frequency, hematuria, pelvic pain and vaginal bleeding.   Musculoskeletal: Negative for arthralgias, back pain and neck pain.   Skin: Negative for pallor, rash and wound.   Allergic/Immunologic: Negative.    Neurological: Negative for dizziness, tremors,  seizures, syncope, facial asymmetry, speech difficulty, light-headedness, numbness and headaches.   Hematological: Negative for adenopathy. Does not bruise/bleed easily.   Psychiatric/Behavioral: Negative for agitation, behavioral problems, confusion, decreased concentration, dysphoric mood and hallucinations. The patient is not nervous/anxious and is not hyperactive.      Objective:     Vital Signs (Most Recent):  Temp: 99.1 °F (37.3 °C) (01/26/19 0630)  Pulse: 98 (01/26/19 0802)  Resp: (!) 22 (01/26/19 0802)  BP: (!) 154/67 (01/26/19 0802)  SpO2: 97 % (01/26/19 0802) Vital Signs (24h Range):  Temp:  [98.9 °F (37.2 °C)-99.8 °F (37.7 °C)] 99.1 °F (37.3 °C)  Pulse:  [] 98  Resp:  [20-29] 22  SpO2:  [90 %-97 %] 97 %  BP: (139-171)/(67-82) 154/67     Weight: 108.6 kg (239 lb 5 oz)  Body mass index is 37.48 kg/m².  Body surface area is 2.27 meters squared.      Intake/Output Summary (Last 24 hours) at 1/26/2019 1135  Last data filed at 1/26/2019 0400  Gross per 24 hour   Intake 1300 ml   Output --   Net 1300 ml       Physical Exam   Constitutional: She is oriented to person, place, and time. She appears well-developed and well-nourished. No distress.   Well groomed   HENT:   Head: Normocephalic and atraumatic.   Right Ear: Tympanic membrane and ear canal normal.   Left Ear: Tympanic membrane and ear canal normal.   Nose: Nose normal. Right sinus exhibits no maxillary sinus tenderness and no frontal sinus tenderness. Left sinus exhibits no maxillary sinus tenderness and no frontal sinus tenderness.   Mouth/Throat: Oropharynx is clear and moist and mucous membranes are normal. No oral lesions. Normal dentition. No oropharyngeal exudate.   Eyes: Conjunctivae, EOM and lids are normal. Pupils are equal, round, and reactive to light. Lids are everted and swept, no foreign bodies found. No scleral icterus.   Neck: Trachea normal and normal range of motion. Neck supple. No JVD present. No tracheal deviation present. No  thyroid mass and no thyromegaly present.   No crepitus   Cardiovascular: Normal rate, regular rhythm, normal heart sounds, intact distal pulses and normal pulses. Exam reveals no gallop and no friction rub.   No murmur heard.  Pulmonary/Chest: Effort normal. She has decreased breath sounds in the right lower field. She has no wheezes. She has no rhonchi. She has no rales. She exhibits no tenderness.   Abdominal: Soft. Normal appearance and bowel sounds are normal. She exhibits no distension and no mass. There is no hepatosplenomegaly. There is tenderness (To deep palpation). There is no rebound and no guarding.   Musculoskeletal: Normal range of motion. She exhibits no edema or tenderness.   Lymphadenopathy:     She has no cervical adenopathy.   Neurological: She is alert and oriented to person, place, and time. No cranial nerve deficit. She exhibits normal muscle tone. Coordination normal.   Skin: Skin is warm and dry. No rash noted. She is not diaphoretic. No cyanosis or erythema. No pallor. Nails show no clubbing.   Psychiatric: She has a normal mood and affect. Her behavior is normal. Judgment and thought content normal.       Significant Labs:   CBC:   Recent Labs   Lab 01/25/19 2319   WBC 13.82*   HGB 12.0   HCT 37.5      , CMP:   Recent Labs   Lab 01/25/19 2319      K 3.7      CO2 26   GLU 94   BUN 12   CREATININE 0.8   CALCIUM 9.3   PROT 7.0   ALBUMIN 3.5   BILITOT 0.8   ALKPHOS 122   AST 40   ALT 40   ANIONGAP 9   EGFRNONAA >60   , Coagulation: No results for input(s): PT, INR, APTT in the last 48 hours., Haptoglobin: No results for input(s): HAPTOGLOBIN in the last 48 hours., Immunology: No results for input(s): SPEP, JULISSA, LOYD, FREELAMBDALI in the last 48 hours., LDH: No results for input(s): LDHCSF, BFSOURCE in the last 48 hours. and LFTs:   Recent Labs   Lab 01/25/19 2319   ALT 40   AST 40   ALKPHOS 122   BILITOT 0.8   PROT 7.0   ALBUMIN 3.5       Diagnostic Results:  I have  reviewed all pertinent imaging results/findings within the past 24 hours.    Assessment/Plan:     * Pleural effusion, right    --planning thoracentesis with cytology     Primary peritoneal carcinomatosis    Overall imaging consistent with metastatic malignancy/peritoneal carcinomatosis.  We will order tumor markers and plan to do cytology on thoracentesis, however, the patient will need CT-guided biopsy of 1 of the abdominal masses for tissue diagnosis and provide tissue for molecular studies.    --tumor markers  --CT-guided biopsy of 1 of the abdominal masses for tissue diagnosis and molecular studies  --supportive care           Thank you for your consult. I will follow-up with patient. Please contact us if you have any additional questions.     Will Trevizo Jr, MD  Hematology/Oncology  [facility] [place]

## 2019-01-26 NOTE — HPI
63-year-old female with a history of CHF, dyslipidemia, hypertension who presented to the emergency room with progressive shortness of breath over the previous month.  She also reports significant abdominal discomfort with slight distention.  01/25/2019 CT scan demonstrated numerous soft tissue masses within the left a abdominal cavity consistent with peritoneal carcinomatosis.  The patient has no personal history of malignancy and underwent hysterectomy in 2006 for fibroid uterus.  Imaging also demonstrated large right pleural effusion for which thoracentesis is planned.    Patient has a family history of colon cancer in her brother at age 62 and lung cancer in her brother at age 64  No history of breast or ovarian cancer

## 2019-01-26 NOTE — H&P
[facility] Hospital Medicine  History & Physical    Patient Name: Casie Gaines  MRN: [id number]  Admission Date: 2019  Attending Physician: Raheem Marti MD  Primary Care Provider: Rossana Ang MD         Patient information was obtained from patient, past medical records and ER records.     Subjective:     Principal Problem:Pleural effusion, right    Chief Complaint:   Chief Complaint   Patient presents with    Abdominal Pain     pt c/o constant R lower abd pain    Shortness of Breath     pt reports SOB for about 2 weeks that worsened today        HPI: Ms. Gaines is a 63-year-old  female with PMH significant for HTN, hyperlipidemia, presents to [facility] ED complaining of progressively worsening shortness of breath for the past 3-4 weeks, associated with left abdominal discomfort.  She reports chills but no fever.  Complains of shortness of breath without cough, congestion.  Denies hemoptysis or recent weight loss.  In the ED chest x-ray revealed large right sided pleural effusion.  CT abdomen revealed numerous soft tissue masses within the left kimani abdomen, suggestive of peritoneal carcinomatosis.  Patient has no known history of malignancy.  Unknown primary.  In addition patient has urinary tract infection with many bacteria, greater than 100 WBC, positive nitrites and leukocyte esterases.  She was started on IV Rocephin in the ED.  Admitting diagnosis large pleural effusion of unknown etiology, and new diagnosis of peritoneal carcinomatosis.  Oncology and Pulmonary consultations placed.    Past Medical History:   Diagnosis Date    Anemia     Anxiety     Arthritis     knees    CHF (congestive heart failure)     Hyperlipemia     Hypertension     Neck pain        Past Surgical History:   Procedure Laterality Date    breast reduction  10/02/2018     SECTION      X 1    DILATION AND CURETTAGE OF UTERUS      HYSTERECTOMY      RALH for  fibroids (still has ovaries)    PLACEMENT, TRANSOBTURATOR TAPE N/A 2014    [facility] [place]    ROBOTIC HYSTERECTOMY N/A 2014    [facility] at [facility] [place]    TUBAL LIGATION         Review of patient's allergies indicates:  No Known Allergies    No current facility-administered medications on file prior to encounter.      Current Outpatient Medications on File Prior to Encounter   Medication Sig    multivitamin with minerals tablet Take by mouth.    olmesartan-hydrochlorothiazide (BENICAR HCT) 40-12.5 mg Tab Take 1 tablet by mouth once daily.    rosuvastatin (CRESTOR) 10 MG tablet Take 1 tablet (10 mg total) by mouth once daily.    zinc gluconate 50 mg tablet Take 50 mg by mouth once daily.    albuterol 90 mcg/actuation inhaler Inhale 2 puffs into the lungs every 4 (four) hours as needed for Wheezing. Rescue     Family History     Problem Relation (Age of Onset)    Anesthesia problems Other    Breast cancer Maternal Aunt, Paternal Aunt    Colon cancer Brother    Ovarian cancer Paternal Aunt        Tobacco Use    Smoking status: Former Smoker     Packs/day: 1.00     Years: 25.00     Pack years: 25.00     Last attempt to quit: 10/1/2018     Years since quittin.3    Smokeless tobacco: Never Used    Tobacco comment: States started quit 2 months ago after 30 years   Substance and Sexual Activity    Alcohol use: No     Alcohol/week: 0.0 oz     Frequency: Never     Comment: occasionally    Drug use: No    Sexual activity: Not Currently     Partners: Male     Comment: hyst; mut monog     Review of Systems   Constitutional: Positive for appetite change, chills and fatigue. Negative for diaphoresis and fever.   HENT: Negative.  Negative for congestion, nosebleeds and sinus pressure.    Eyes: Negative.  Negative for visual disturbance.   Respiratory: Positive for cough (Nonproductive cough) and shortness of breath. Negative for chest tightness  and wheezing.    Cardiovascular: Positive for leg swelling. Negative for chest pain and palpitations.   Gastrointestinal: Positive for abdominal pain (Generalized) and nausea. Negative for blood in stool, diarrhea and vomiting.   Endocrine: Negative.  Negative for polyuria.   Genitourinary: Negative.  Negative for dysuria, flank pain, frequency and urgency.   Musculoskeletal: Negative.  Negative for back pain, joint swelling and neck stiffness.   Skin: Negative.  Negative for color change, pallor and rash.   Allergic/Immunologic: Negative.  Negative for immunocompromised state.   Neurological: Negative.  Negative for dizziness, syncope, speech difficulty, numbness and headaches.   Hematological: Negative.  Does not bruise/bleed easily.   Psychiatric/Behavioral: Negative.  Negative for confusion, decreased concentration and hallucinations. The patient is not nervous/anxious.    All other systems reviewed and are negative.    Objective:     Vital Signs (Most Recent):  Temp: 98.9 °F (37.2 °C) (01/26/19 0304)  Pulse: 94 (01/26/19 0501)  Resp: (!) 21 (01/26/19 0501)  BP: (!) 154/70 (01/26/19 0501)  SpO2: (!) 94 % (01/26/19 0501) Vital Signs (24h Range):  Temp:  [98.9 °F (37.2 °C)-99.8 °F (37.7 °C)] 98.9 °F (37.2 °C)  Pulse:  [] 94  Resp:  [20-29] 21  SpO2:  [90 %-96 %] 94 %  BP: (139-171)/(68-82) 154/70     Weight: 108.6 kg (239 lb 5 oz)  Body mass index is 37.48 kg/m².    Physical Exam   Constitutional: She is oriented to person, place, and time. No distress.   Morbidly obese  female, in no respiratory distress, comfortably lying in bed.  No family at the bedside.   HENT:   Head: Normocephalic and atraumatic.   Eyes: Conjunctivae and EOM are normal. No scleral icterus.   Neck: Normal range of motion. Neck supple.   Cardiovascular: Normal rate, regular rhythm, normal heart sounds and intact distal pulses.   No murmur heard.  Pulmonary/Chest: Effort normal. No accessory muscle usage. No respiratory  distress. She has decreased breath sounds in the right middle field and the right lower field. She has no wheezes. She has no rales. She exhibits no tenderness.   Abdominal: Soft. Bowel sounds are normal. She exhibits no distension. There is tenderness (Vague generalized abdominal discomfort on palpation).   Musculoskeletal: Normal range of motion. She exhibits edema. She exhibits no tenderness.   Neurological: She is alert and oriented to person, place, and time. No cranial nerve deficit. She exhibits normal muscle tone. Coordination normal.   Skin: Skin is warm and dry. She is not diaphoretic. No erythema.   Psychiatric: She has a normal mood and affect. Her behavior is normal. Thought content normal.   Nursing note and vitals reviewed.        CRANIAL NERVES     CN III, IV, VI   Extraocular motions are normal.        Significant Labs:   Bilirubin:   Recent Labs   Lab 01/25/19 2319   BILITOT 0.8     Blood Culture: No results for input(s): LABBLOO in the last 48 hours.  BMP:   Recent Labs   Lab 01/25/19 2319   GLU 94      K 3.7      CO2 26   BUN 12   CREATININE 0.8   CALCIUM 9.3     CBC:   Recent Labs   Lab 01/25/19 2319   WBC 13.82*   HGB 12.0   HCT 37.5        CMP:   Recent Labs   Lab 01/25/19 2319      K 3.7      CO2 26   GLU 94   BUN 12   CREATININE 0.8   CALCIUM 9.3   PROT 7.0   ALBUMIN 3.5   BILITOT 0.8   ALKPHOS 122   AST 40   ALT 40   ANIONGAP 9   EGFRNONAA >60     Cardiac Markers:   Recent Labs   Lab 01/25/19 2319   BNP 11     Coagulation: No results for input(s): PT, INR, APTT in the last 48 hours.  Lactic Acid:   Recent Labs   Lab 01/25/19 2319   LACTATE 0.9     Lipase: No results for input(s): LIPASE in the last 48 hours.  Lipid Panel: No results for input(s): CHOL, HDL, LDLCALC, TRIG, CHOLHDL in the last 48 hours.  Troponin:   Recent Labs   Lab 01/25/19 2319   TROPONINI <0.006     TSH: No results for input(s): TSH in the last 4320 hours.  Urine Culture: No results  for input(s): LABURIN in the last 48 hours.  Urine Studies:   Recent Labs   Lab 01/25/19  2355   COLORU Yellow   APPEARANCEUA Clear   PHUR 8.0   SPECGRAV 1.010   PROTEINUA Trace*   GLUCUA Negative   KETONESU Negative   BILIRUBINUA Negative   OCCULTUA Trace*   NITRITE Positive*   UROBILINOGEN 2.0-3.0*   LEUKOCYTESUR 3+*   RBCUA 5*   WBCUA >100*   BACTERIA Many*   SQUAMEPITHEL 5     All pertinent labs within the past 24 hours have been reviewed.    Significant Imaging: I have reviewed and interpreted all pertinent imaging results/findings within the past 24 hours.     Imaging Results          CT Chest Abdoment Pelvis With Contrast (In process)                X-Ray Chest PA And Lateral (Final result)  Result time 01/25/19 23:27:16    Final result by Inocencio Francis Jr., MD (01/25/19 23:27:16)                 Impression:      Pleural-parenchymal disease on the right may relate to pneumonia.  Follow-up to resolution is recommended.      Electronically signed by: Inocencio Francis Jr., MD  Date:    01/25/2019  Time:    23:27             Narrative:    EXAMINATION:  XR CHEST PA AND LATERAL    CLINICAL HISTORY:  Shortness of breath    TECHNIQUE:  PA and lateral views of the chest were performed.    COMPARISON:  PA and lateral from January 31, 2018.    FINDINGS:  There is new pleural parenchymal disease involving the right lower chest.  The more superior right lung and the left lung are clear.  No pneumothorax.  The trachea is midline.    The cardiac silhouette is normal in size. The hilar and mediastinal contours are unremarkable.    Bones are intact.                                I have independently reviewed and interpreted the EKG.     I have independently reviewed all pertinent labs within the past 24 hours.    I have independently reviewed, visualized and interpreted all pertinent imaging results within the past 24 hours and discussed the findings with the ED physician, Dr. Shultz.            Assessment/Plan:     * Pleural  effusion, right    Large right-sided pleural effusion of unknown etiology.  New finding of peritoneal carcinomatosis in the abdomen.  Consult Pulmonary for thoracentesis.  Supplemental oxygen to maintain saturations greater than 92%.  Bronchodilators as needed.       Primary peritoneal carcinomatosis    New finding on CT abdomen done today.  No history of known malignancy.  Consult hematology oncology for evaluation/recommendations.         Urinary tract infection  Many bacteria, greater than 100 WBC, positive for leukocyte esterases and nitrites.  Rocephin 2 g IV daily.  Follow up on cultures.    Hypertension    Continue home dose olmesartan, hydrochlorothiazide.  Monitor blood pressure closely and make adjustments as needed.       Hyperlipidemia    Continue home dose statin.       History of total hysterectomy with bilateral salpingo-oophorectomy (BSO)            Morbid obesity    BMI 37.4.         VTE Risk Mitigation (From admission, onward)        Ordered     Place sequential compression device  Until discontinued      01/26/19 0583             Raheem Marti MD  Department of Hospital Medicine   [facility]

## 2019-01-26 NOTE — ASSESSMENT & PLAN NOTE
Continue home dose olmesartan, hydrochlorothiazide.  Monitor blood pressure closely and make adjustments as needed.

## 2019-01-26 NOTE — H&P (VIEW-ONLY)
[facility]   Pulmonology  Consult Note    Patient Name: Casie Gaines  MRN: [id number]  Admission Date: 2019  Hospital Length of Stay: 0 days  Code Status: No Order  Attending Physician: Raheem Marti MD  Primary Care Provider: Rossana Ang MD   Principal Problem: Pleural effusion, right      Subjective:     HPI:  63-year-old female patient presenting to the hospital for worsening shortness of breath. Twenty-five pack year smoking history.  Has family history of brothers with colon cancer and lung cancer .  No personal history of cancer.  Complains also of abdominal distention and generalized weakness.    Past Medical History:   Diagnosis Date    Anemia     Anxiety     Arthritis     knees    CHF (congestive heart failure)     Hyperlipemia     Hypertension     Neck pain      Past Surgical History:   Procedure Laterality Date    breast reduction  10/02/2018     SECTION      X 1    DILATION AND CURETTAGE OF UTERUS      HYSTERECTOMY      RALH for fibroids (still has ovaries)    PLACEMENT, TRANSOBTURATOR TAPE N/A 2014    [facility] [place]    ROBOTIC HYSTERECTOMY N/A 2014    [facility]formed [facility] at [facility] [place]    TUBAL LIGATION         Review of patient's allergies indicates:  No Known Allergies    Family History     Problem Relation (Age of Onset)    Anesthesia problems Other    Breast cancer Maternal Aunt, Paternal Aunt    Colon cancer Brother    Ovarian cancer Paternal Aunt        Tobacco Use    Smoking status: Former Smoker     Packs/day: 1.00     Years: 25.00     Pack years: 25.00     Last attempt to quit: 10/1/2018     Years since quittin.3    Smokeless tobacco: Never Used    Tobacco comment: States started quit 2 months ago after 30 years   Substance and Sexual Activity    Alcohol use: No     Alcohol/week: 0.0 oz     Frequency: Never     Comment: occasionally    Drug use: No    Sexual activity: Not  Currently     Partners: Male     Comment: hyst; mut monog         Review of Systems   Constitutional: Positive for fatigue. Negative for chills and fever.   HENT: Negative for drooling and nosebleeds.    Eyes: Negative for discharge.   Respiratory: Positive for cough and shortness of breath. Negative for stridor.    Cardiovascular: Negative for chest pain.   Gastrointestinal: Positive for abdominal distention. Negative for blood in stool.   Endocrine: Negative for cold intolerance.   Genitourinary: Negative for hematuria.   Musculoskeletal: Positive for neck pain. Negative for gait problem.   Skin: Negative for wound.   Allergic/Immunologic: Negative for immunocompromised state.   Neurological: Negative for seizures.   Hematological: Positive for adenopathy.   Psychiatric/Behavioral: Negative for behavioral problems. The patient is nervous/anxious.      Objective:     Vital Signs (Most Recent):  Temp: 98.4 °F (36.9 °C) (01/26/19 1229)  Pulse: 94 (01/26/19 1229)  Resp: 18 (01/26/19 1229)  BP: 137/65 (01/26/19 1229)  SpO2: 95 % (01/26/19 1229) Vital Signs (24h Range):  Temp:  [98.4 °F (36.9 °C)-99.8 °F (37.7 °C)] 98.4 °F (36.9 °C)  Pulse:  [] 94  Resp:  [18-29] 18  SpO2:  [90 %-97 %] 95 %  BP: (137-171)/(65-82) 137/65     Weight: 108.6 kg (239 lb 5 oz)  Body mass index is 37.48 kg/m².      Intake/Output Summary (Last 24 hours) at 1/26/2019 1750  Last data filed at 1/26/2019 1700  Gross per 24 hour   Intake 1660 ml   Output --   Net 1660 ml       Physical Exam   Constitutional: She is oriented to person, place, and time. She appears well-developed and well-nourished. No distress.   HENT:   Head: Normocephalic and atraumatic.   Eyes: EOM are normal.   Neck: Neck supple.   Cardiovascular: Normal rate and regular rhythm.   Pulmonary/Chest: Effort normal. No respiratory distress.   Breath sounds decreased on the right side.   Abdominal: Soft. She exhibits distension. There is no tenderness.   Musculoskeletal: She  exhibits no edema.   Neurological: She is alert and oriented to person, place, and time.   Skin: Skin is warm.   Psychiatric: She has a normal mood and affect. Her behavior is normal. Judgment and thought content normal.   Nursing note and vitals reviewed.    Vents:     Lines/Drains/Airways     Drain                 Urethral Catheter 01/26/19 1141 Latex 16 Fr. less than 1 day          Peripheral Intravenous Line                 Peripheral IV - Single Lumen 01/25/19 2318 Left Antecubital less than 1 day                Significant Labs:    CBC/Anemia Profile:  Recent Labs   Lab 01/25/19 2319   WBC 13.82*   HGB 12.0   HCT 37.5      MCV 85   RDW 15.0*        Chemistries:  Recent Labs   Lab 01/25/19 2319      K 3.7      CO2 26   BUN 12   CREATININE 0.8   CALCIUM 9.3   ALBUMIN 3.5   PROT 7.0   BILITOT 0.8   ALKPHOS 122   ALT 40   AST 40       Significant Imaging:   CT: I have reviewed all pertinent results/findings within the past 24 hours and my personal findings are:  Right-sided pleural effusion with atelectasis.  Abdominal mass likely right ovarian mass with abdominal lymphadenopathy.  CXR: I have reviewed all pertinent results/findings within the past 24 hours and my personal findings are:  Right-sided pleural effusion.      ABG  No results for input(s): PH, PO2, PCO2, HCO3, BE in the last 168 hours.  Assessment/Plan:     * Pleural effusion, right    IR consult for thoracentesis or abdominal mass biopsy.     Abdominal mass    Check tumor markers.  IR consult for biopsy.     UTI (urinary tract infection)    1/26 urine culture pending.  IV Rocephin.  UA positive nitrites and WBC.     History of total hysterectomy with bilateral salpingo-oophorectomy (BSO)    Done for uterine fibroid in the past.           Thank you for your consult. I will follow-up with patient. Please contact us if you have any additional questions.     Arnaldo Mathur MD  Pulmonology  [facility]

## 2019-01-26 NOTE — SUBJECTIVE & OBJECTIVE
Past Medical History:   Diagnosis Date    Anemia     Anxiety     Arthritis     knees    CHF (congestive heart failure)     Hyperlipemia     Hypertension     Neck pain      Past Surgical History:   Procedure Laterality Date    breast reduction  10/02/2018     SECTION      X 1    DILATION AND CURETTAGE OF UTERUS      HYSTERECTOMY      RALH for fibroids (still has ovaries)    PLACEMENT, TRANSOBTURATOR TAPE N/A 2014    [facility]    ROBOTIC HYSTERECTOMY N/A 2014    Performed by [facility] at [facility] [place]    TUBAL LIGATION         Review of patient's allergies indicates:  No Known Allergies    Family History     Problem Relation (Age of Onset)    Anesthesia problems Other    Breast cancer Maternal Aunt, Paternal Aunt    Colon cancer Brother    Ovarian cancer Paternal Aunt        Tobacco Use    Smoking status: Former Smoker     Packs/day: 1.00     Years: 25.00     Pack years: 25.00     Last attempt to quit: 10/1/2018     Years since quittin.3    Smokeless tobacco: Never Used    Tobacco comment: States started quit 2 months ago after 30 years   Substance and Sexual Activity    Alcohol use: No     Alcohol/week: 0.0 oz     Frequency: Never     Comment: occasionally    Drug use: No    Sexual activity: Not Currently     Partners: Male     Comment: hyst; mut monog         Review of Systems   Constitutional: Positive for fatigue. Negative for chills and fever.   HENT: Negative for drooling and nosebleeds.    Eyes: Negative for discharge.   Respiratory: Positive for cough and shortness of breath. Negative for stridor.    Cardiovascular: Negative for chest pain.   Gastrointestinal: Positive for abdominal distention. Negative for blood in stool.   Endocrine: Negative for cold intolerance.   Genitourinary: Negative for hematuria.   Musculoskeletal: Positive for neck pain. Negative for gait problem.   Skin: Negative for wound.    Allergic/Immunologic: Negative for immunocompromised state.   Neurological: Negative for seizures.   Hematological: Positive for adenopathy.   Psychiatric/Behavioral: Negative for behavioral problems. The patient is nervous/anxious.      Objective:     Vital Signs (Most Recent):  Temp: 98.4 °F (36.9 °C) (01/26/19 1229)  Pulse: 94 (01/26/19 1229)  Resp: 18 (01/26/19 1229)  BP: 137/65 (01/26/19 1229)  SpO2: 95 % (01/26/19 1229) Vital Signs (24h Range):  Temp:  [98.4 °F (36.9 °C)-99.8 °F (37.7 °C)] 98.4 °F (36.9 °C)  Pulse:  [] 94  Resp:  [18-29] 18  SpO2:  [90 %-97 %] 95 %  BP: (137-171)/(65-82) 137/65     Weight: 108.6 kg (239 lb 5 oz)  Body mass index is 37.48 kg/m².      Intake/Output Summary (Last 24 hours) at 1/26/2019 1750  Last data filed at 1/26/2019 1700  Gross per 24 hour   Intake 1660 ml   Output --   Net 1660 ml       Physical Exam   Constitutional: She is oriented to person, place, and time. She appears well-developed and well-nourished. No distress.   HENT:   Head: Normocephalic and atraumatic.   Eyes: EOM are normal.   Neck: Neck supple.   Cardiovascular: Normal rate and regular rhythm.   Pulmonary/Chest: Effort normal. No respiratory distress.   Breath sounds decreased on the right side.   Abdominal: Soft. She exhibits distension. There is no tenderness.   Musculoskeletal: She exhibits no edema.   Neurological: She is alert and oriented to person, place, and time.   Skin: Skin is warm.   Psychiatric: She has a normal mood and affect. Her behavior is normal. Judgment and thought content normal.   Nursing note and vitals reviewed.    Vents:     Lines/Drains/Airways     Drain                 Urethral Catheter 01/26/19 1141 Latex 16 Fr. less than 1 day          Peripheral Intravenous Line                 Peripheral IV - Single Lumen 01/25/19 2318 Left Antecubital less than 1 day                Significant Labs:    CBC/Anemia Profile:  Recent Labs   Lab 01/25/19  2319   WBC 13.82*   HGB 12.0   HCT  37.5      MCV 85   RDW 15.0*        Chemistries:  Recent Labs   Lab 01/25/19  2319      K 3.7      CO2 26   BUN 12   CREATININE 0.8   CALCIUM 9.3   ALBUMIN 3.5   PROT 7.0   BILITOT 0.8   ALKPHOS 122   ALT 40   AST 40       Significant Imaging:   CT: I have reviewed all pertinent results/findings within the past 24 hours and my personal findings are:  Right-sided pleural effusion with atelectasis.  Abdominal mass likely right ovarian mass with abdominal lymphadenopathy.  CXR: I have reviewed all pertinent results/findings within the past 24 hours and my personal findings are:  Right-sided pleural effusion.

## 2019-01-26 NOTE — HPI
63-year-old female patient presenting to the hospital for worsening shortness of breath. Twenty-five pack year smoking history.  Has family history of brothers with colon cancer and lung cancer .  No personal history of cancer.  Complains also of abdominal distention and generalized weakness.

## 2019-01-26 NOTE — PROCEDURES
With ultrasound guidance small right-sided pleural septated effusion noted. A tented twice to obtain pleural fluid.  Was unsuccessful.

## 2019-01-26 NOTE — ASSESSMENT & PLAN NOTE
Many bacteria, greater than 100 WBC, positive for leukocyte esterases and nitrites.  Rocephin 2 g IV daily.  Follow up on culture pending Ecoli

## 2019-01-26 NOTE — SUBJECTIVE & OBJECTIVE
Past Medical History:   Diagnosis Date    Anemia     Anxiety     Arthritis     knees    CHF (congestive heart failure)     Hyperlipemia     Hypertension     Neck pain        Past Surgical History:   Procedure Laterality Date    breast reduction  10/02/2018     SECTION      X 1    DILATION AND CURETTAGE OF UTERUS      HYSTERECTOMY      RALH for fibroids (still has ovaries)    PLACEMENT, TRANSOBTURATOR TAPE N/A 2014    Performed by Sayra Aguilar MD at [facility] [place]    ROBOTIC HYSTERECTOMY N/A 2014    Performed by Sayra Aguilar MD at [facility] [place]    TUBAL LIGATION         Review of patient's allergies indicates:  No Known Allergies    No current facility-administered medications on file prior to encounter.      Current Outpatient Medications on File Prior to Encounter   Medication Sig    multivitamin with minerals tablet Take by mouth.    olmesartan-hydrochlorothiazide (BENICAR HCT) 40-12.5 mg Tab Take 1 tablet by mouth once daily.    rosuvastatin (CRESTOR) 10 MG tablet Take 1 tablet (10 mg total) by mouth once daily.    zinc gluconate 50 mg tablet Take 50 mg by mouth once daily.    albuterol 90 mcg/actuation inhaler Inhale 2 puffs into the lungs every 4 (four) hours as needed for Wheezing. Rescue     Family History     Problem Relation (Age of Onset)    Anesthesia problems Other    Breast cancer Maternal Aunt, Paternal Aunt    Colon cancer Brother    Ovarian cancer Paternal Aunt        Tobacco Use    Smoking status: Former Smoker     Packs/day: 1.00     Years: 25.00     Pack years: 25.00     Last attempt to quit: 10/1/2018     Years since quittin.3    Smokeless tobacco: Never Used    Tobacco comment: States started quit 2 months ago after 30 years   Substance and Sexual Activity    Alcohol use: No     Alcohol/week: 0.0 oz     Frequency: Never     Comment: occasionally    Drug use: No    Sexual activity: Not Currently     Partners: Male     Comment: hyst; mut monog      Review of Systems   Constitutional: Positive for appetite change, chills and fatigue. Negative for diaphoresis and fever.   HENT: Negative.  Negative for congestion, nosebleeds and sinus pressure.    Eyes: Negative.  Negative for visual disturbance.   Respiratory: Positive for cough (Nonproductive cough) and shortness of breath. Negative for chest tightness and wheezing.    Cardiovascular: Positive for leg swelling. Negative for chest pain and palpitations.   Gastrointestinal: Positive for abdominal pain (Generalized) and nausea. Negative for blood in stool, diarrhea and vomiting.   Endocrine: Negative.  Negative for polyuria.   Genitourinary: Negative.  Negative for dysuria, flank pain, frequency and urgency.   Musculoskeletal: Negative.  Negative for back pain, joint swelling and neck stiffness.   Skin: Negative.  Negative for color change, pallor and rash.   Allergic/Immunologic: Negative.  Negative for immunocompromised state.   Neurological: Negative.  Negative for dizziness, syncope, speech difficulty, numbness and headaches.   Hematological: Negative.  Does not bruise/bleed easily.   Psychiatric/Behavioral: Negative.  Negative for confusion, decreased concentration and hallucinations. The patient is not nervous/anxious.    All other systems reviewed and are negative.    Objective:     Vital Signs (Most Recent):  Temp: 98.9 °F (37.2 °C) (01/26/19 0304)  Pulse: 94 (01/26/19 0501)  Resp: (!) 21 (01/26/19 0501)  BP: (!) 154/70 (01/26/19 0501)  SpO2: (!) 94 % (01/26/19 0501) Vital Signs (24h Range):  Temp:  [98.9 °F (37.2 °C)-99.8 °F (37.7 °C)] 98.9 °F (37.2 °C)  Pulse:  [] 94  Resp:  [20-29] 21  SpO2:  [90 %-96 %] 94 %  BP: (139-171)/(68-82) 154/70     Weight: 108.6 kg (239 lb 5 oz)  Body mass index is 37.48 kg/m².    Physical Exam   Constitutional: She is oriented to person, place, and time. No distress.   Morbidly obese  female, in no respiratory distress, comfortably lying in bed.  No  family at the bedside.   HENT:   Head: Normocephalic and atraumatic.   Eyes: Conjunctivae and EOM are normal. No scleral icterus.   Neck: Normal range of motion. Neck supple.   Cardiovascular: Normal rate, regular rhythm, normal heart sounds and intact distal pulses.   No murmur heard.  Pulmonary/Chest: Effort normal. No accessory muscle usage. No respiratory distress. She has decreased breath sounds in the right middle field and the right lower field. She has no wheezes. She has no rales. She exhibits no tenderness.   Abdominal: Soft. Bowel sounds are normal. She exhibits no distension. There is tenderness (Vague generalized abdominal discomfort on palpation).   Musculoskeletal: Normal range of motion. She exhibits edema. She exhibits no tenderness.   Neurological: She is alert and oriented to person, place, and time. No cranial nerve deficit. She exhibits normal muscle tone. Coordination normal.   Skin: Skin is warm and dry. She is not diaphoretic. No erythema.   Psychiatric: She has a normal mood and affect. Her behavior is normal. Thought content normal.   Nursing note and vitals reviewed.        CRANIAL NERVES     CN III, IV, VI   Extraocular motions are normal.        Significant Labs:   Bilirubin:   Recent Labs   Lab 01/25/19 2319   BILITOT 0.8     Blood Culture: No results for input(s): LABBLOO in the last 48 hours.  BMP:   Recent Labs   Lab 01/25/19 2319   GLU 94      K 3.7      CO2 26   BUN 12   CREATININE 0.8   CALCIUM 9.3     CBC:   Recent Labs   Lab 01/25/19 2319   WBC 13.82*   HGB 12.0   HCT 37.5        CMP:   Recent Labs   Lab 01/25/19 2319      K 3.7      CO2 26   GLU 94   BUN 12   CREATININE 0.8   CALCIUM 9.3   PROT 7.0   ALBUMIN 3.5   BILITOT 0.8   ALKPHOS 122   AST 40   ALT 40   ANIONGAP 9   EGFRNONAA >60     Cardiac Markers:   Recent Labs   Lab 01/25/19 2319   BNP 11     Coagulation: No results for input(s): PT, INR, APTT in the last 48 hours.  Lactic Acid:    Recent Labs   Lab 01/25/19  2319   LACTATE 0.9     Lipase: No results for input(s): LIPASE in the last 48 hours.  Lipid Panel: No results for input(s): CHOL, HDL, LDLCALC, TRIG, CHOLHDL in the last 48 hours.  Troponin:   Recent Labs   Lab 01/25/19  2319   TROPONINI <0.006     TSH: No results for input(s): TSH in the last 4320 hours.  Urine Culture: No results for input(s): LABURIN in the last 48 hours.  Urine Studies:   Recent Labs   Lab 01/25/19  2355   COLORU Yellow   APPEARANCEUA Clear   PHUR 8.0   SPECGRAV 1.010   PROTEINUA Trace*   GLUCUA Negative   KETONESU Negative   BILIRUBINUA Negative   OCCULTUA Trace*   NITRITE Positive*   UROBILINOGEN 2.0-3.0*   LEUKOCYTESUR 3+*   RBCUA 5*   WBCUA >100*   BACTERIA Many*   SQUAMEPITHEL 5     All pertinent labs within the past 24 hours have been reviewed.    Significant Imaging: I have reviewed and interpreted all pertinent imaging results/findings within the past 24 hours.     Imaging Results          CT Chest Abdoment Pelvis With Contrast (In process)                X-Ray Chest PA And Lateral (Final result)  Result time 01/25/19 23:27:16    Final result by Inocencio Francis Jr., MD (01/25/19 23:27:16)                 Impression:      Pleural-parenchymal disease on the right may relate to pneumonia.  Follow-up to resolution is recommended.      Electronically signed by: Inocencio Francis Jr., MD  Date:    01/25/2019  Time:    23:27             Narrative:    EXAMINATION:  XR CHEST PA AND LATERAL    CLINICAL HISTORY:  Shortness of breath    TECHNIQUE:  PA and lateral views of the chest were performed.    COMPARISON:  PA and lateral from January 31, 2018.    FINDINGS:  There is new pleural parenchymal disease involving the right lower chest.  The more superior right lung and the left lung are clear.  No pneumothorax.  The trachea is midline.    The cardiac silhouette is normal in size. The hilar and mediastinal contours are unremarkable.    Bones are intact.                                 I have independently reviewed and interpreted the EKG.     I have independently reviewed all pertinent labs within the past 24 hours.    I have independently reviewed, visualized and interpreted all pertinent imaging results within the past 24 hours and discussed the findings with the ED physician, Dr. Shultz.

## 2019-01-26 NOTE — HOSPITAL COURSE
Pt admitted for Large right-sided pleural effusion of unknown etiology and new finding of peritoneal carcinomatosis in the abdomen on CT abd. CT abd also showed moderate right sided hydronephrosis. Pulm consulted for thoracentesis but unsuccessful after 3 attempts. IR consulted for CT-guided biopsy of abdominal masses for tissue diagnosis and molecular studies. IR also consulted for thoracentesis and CT guided biopsy.     Rocephin started for UTI. Urine culture shows gram negative kimberli.     Heme/Onc consulted. Tumor markers showed  is 2740, CEA 65.4.     Discussed right sided hydronephrosis with Urology who recommended IVP- IVP can not bee done until tomorrow due to contrast injection today     1/29  Patients/p IVP. Plan for stent placement from Urology tomorrow. Path pending. Patient appropriate and conversant.    Pt admitted for Large right-sided pleural effusion of unknown etiology and new finding of peritoneal carcinomatosis in the abdomen on CT abd. CT abd also showed moderate right sided hydronephrosis. Pulm consulted for thoracentesis but unsuccessful after 3 attempts. IR consulted for CT-guided biopsy of abdominal masses for tissue diagnosis and molecular studies. IR also consulted for thoracentesis and CT guided biopsy. Performed without event Path pending. Patient with a hydronephrosis secondary to abdominal process. Urology evaluated and successfully placed stent. Urology requesting follow up in 4 weeks. Patient seen and examined today prior to her discharge and deemed stable for a safe discharge to home with close follow up arranged.

## 2019-01-26 NOTE — HPI
Ms. Gaines is a 63-year-old  female with PMH significant for HTN, hyperlipidemia, presents to [facility] ED complaining of progressively worsening shortness of breath for the past 3-4 weeks, associated with left abdominal discomfort.  She reports chills but no fever.  Complains of shortness of breath without cough, congestion.  Denies hemoptysis or recent weight loss.  In the ED chest x-ray revealed large right sided pleural effusion.  CT abdomen revealed numerous soft tissue masses within the left kimani abdomen, suggestive of peritoneal carcinomatosis.  Patient has no known history of malignancy.  Unknown primary.  In addition patient has urinary tract infection with many bacteria, greater than 100 WBC, positive nitrites and leukocyte esterases.  She was started on IV Rocephin in the ED.  Admitting diagnosis large pleural effusion of unknown etiology, and new diagnosis of peritoneal carcinomatosis.  Oncology and Pulmonary consultations placed.

## 2019-01-26 NOTE — ED NOTES
Pt lying in bed in NAD,VSS,RR equal and unlabored. Bed is low, locked, and call light in reach. Side rails up x 2. Pt HOB elevated and lights turned off for comfort.

## 2019-01-26 NOTE — ED NOTES
Pt lying in bed in NAD, VSS, RR equal and unlabored. Bed is low, locked, and call light in reach. Side rails up x 2. Pt unhooked from monitor to go to the bathroom, pt ambulatory with steady gait.

## 2019-01-26 NOTE — ASSESSMENT & PLAN NOTE
Overall imaging consistent with metastatic malignancy/peritoneal carcinomatosis.  We will order tumor markers and plan to do cytology on thoracentesis, however, the patient will need CT-guided biopsy of 1 of the abdominal masses for tissue diagnosis and provide tissue for molecular studies.    --tumor markers  --CT-guided biopsy of 1 of the abdominal masses for tissue diagnosis and molecular studies  --supportive care

## 2019-01-26 NOTE — ED PROVIDER NOTES
SCRIBE #1 NOTE: I, Liliam Hernandez, am scribing for, and in the presence of, Chandrakant Shultz MD. I have scribed the entire note.         History     Chief Complaint   Patient presents with    Abdominal Pain     pt c/o constant R lower abd pain    Shortness of Breath     pt reports SOB for about 2 weeks that worsened today       Review of patient's allergies indicates:  No Known Allergies      History of Present Illness   HPI    2019, 10:52 PM  History obtained from the patient      History of Present Illness: Casie Gaines is a 63 y.o. female patient with PMHx of anemia, CHF, hyperlipidemia, and HTN who presents to the Emergency Department for SOB which onset gradually 1.5-2 weeks ago, worsening tonight. Symptoms are constant and moderate in severity. Patient states sxs feel similar to when she previously had pneumonia. No mitigating or exacerbating factors reported. Associated sxs include wheezing, CP, and cough that onset yesterday. Patient also c/o RLQ abd pain that onset yesterday, worsening today. Prior tx includes merline seltzer with some relief. Patient denies any fever, chills, leg swelling, calf pain, N/V/D, dizziness, extremity weakness/numbness, recent travel, long car rides, and all other sxs at this time. Patient has appointment with PCP in 3 days. Patient is former smoker. No further complaints or concerns at this time.     Arrival mode: Personal vehicle     PCP: Rossana Ang MD        Past Medical History:  Past Medical History:   Diagnosis Date    Anemia     Anxiety     Arthritis     knees    CHF (congestive heart failure)     Hyperlipemia     Hypertension     Neck pain        Past Surgical History:  Past Surgical History:   Procedure Laterality Date    breast reduction  10/02/2018     SECTION      X 1    DILATION AND CURETTAGE OF UTERUS      HYSTERECTOMY      RALH for fibroids (still has ovaries)    PLACEMENT, TRANSOBTURATOR TAPE N/A 2014    Performed by Sayra MALIK  [facility]ques [facility] at [facility] OR    ROBOTIC HYSTERECTOMY N/A 2014    Performed by [facility] at [facility] OR    TUBAL LIGATION           Family History:  Family History   Problem Relation Age of Onset    Anesthesia problems Other     Breast cancer Maternal Aunt     Breast cancer Paternal Aunt     Ovarian cancer Paternal Aunt     Colon cancer Brother     Thrombophilia Neg Hx        Social History:  Social History     Tobacco Use    Smoking status: Former Smoker     Packs/day: 1.00     Years: 25.00     Pack years: 25.00     Last attempt to quit: 10/1/2018     Years since quittin.3    Smokeless tobacco: Never Used    Tobacco comment: States started quit 2 months ago after 30 years   Substance and Sexual Activity    Alcohol use: No     Alcohol/week: 0.0 oz     Frequency: Never     Comment: occasionally    Drug use: No    Sexual activity: Not Currently     Partners: Male     Comment: hyst; mut monog        Review of Systems   Review of Systems   Constitutional: Negative for chills and fever.   HENT: Negative for sore throat.    Respiratory: Positive for cough and shortness of breath.    Cardiovascular: Positive for chest pain. Negative for leg swelling.   Gastrointestinal: Positive for abdominal pain (RLQ). Negative for diarrhea, nausea and vomiting.   Genitourinary: Negative for dysuria.   Musculoskeletal: Negative for back pain.        (-) calf pain   Skin: Negative for rash.   Neurological: Negative for dizziness, weakness and numbness.   Hematological: Does not bruise/bleed easily.   All other systems reviewed and are negative.       Physical Exam     Initial Vitals [19 2225]   BP Pulse Resp Temp SpO2   (!) 159/73 109 20 99.8 °F (37.7 °C) (!) 90 %      MAP       --          Physical Exam  Nursing Notes and Vital Signs Reviewed.  Constitutional: Patient is in no acute distress. Well-developed and well-nourished.  Head: Atraumatic. Normocephalic.  Eyes: PERRL. EOM intact. Conjunctivae  "are not pale. No scleral icterus.  ENT: Mucous membranes are moist. Oropharynx is clear and symmetric.    Neck: Supple. Full ROM. No lymphadenopathy.  Cardiovascular: Tachycardic. Regular rhythm. No murmurs, rubs, or gallops. Distal pulses are 2+ and symmetric.  Pulmonary/Chest: No respiratory distress. Clear to auscultation bilaterally. No wheezing or rales.  Abdominal: Soft and non-distended.  There is RLQ tenderness.  No rebound, guarding, or rigidity.   Musculoskeletal: Moves all extremities. No obvious deformities. 2+ pedal edema bilat. No calf tenderness.  Skin: Warm and dry.  Neurological:  Alert, awake, and appropriate.  Normal speech.  No acute focal neurological deficits are appreciated.  Psychiatric: Normal affect. Good eye contact. Appropriate in content.     ED Course   Procedures  ED Vital Signs:  Vitals:    01/25/19 2225 01/25/19 2245 01/25/19 2302 01/26/19 0035   BP: (!) 159/73 (!) 171/79 (!) 157/68 (!) 162/82   Pulse: 109 102 105 103   Resp: 20 (!) 25 (!) 27    Temp: 99.8 °F (37.7 °C)      TempSrc: Oral      SpO2: (!) 90% 95% (!) 93% (!) 94%   Weight: 108.6 kg (239 lb 5 oz)      Height: 5' 7" (1.702 m)       01/26/19 0109   BP: (!) 148/77   Pulse: 101   Resp: (!) 29   Temp: 99 °F (37.2 °C)   TempSrc: Oral   SpO2: 95%   Weight:    Height:        Abnormal Lab Results:  Labs Reviewed   CBC W/ AUTO DIFFERENTIAL - Abnormal; Notable for the following components:       Result Value    WBC 13.82 (*)     RDW 15.0 (*)     Gran # (ANC) 10.5 (*)     Mono # 1.2 (*)     Gran% 76.0 (*)     Lymph% 14.5 (*)     All other components within normal limits   URINALYSIS, REFLEX TO URINE CULTURE - Abnormal; Notable for the following components:    Protein, UA Trace (*)     Occult Blood UA Trace (*)     Nitrite, UA Positive (*)     Urobilinogen, UA 2.0-3.0 (*)     Leukocytes, UA 3+ (*)     All other components within normal limits    Narrative:     Preferred Collection Type->Urine, Clean Catch   URINALYSIS MICROSCOPIC - " Abnormal; Notable for the following components:    RBC, UA 5 (*)     WBC, UA >100 (*)     Bacteria, UA Many (*)     All other components within normal limits    Narrative:     Preferred Collection Type->Urine, Clean Catch   CULTURE, BLOOD   CULTURE, BLOOD   CULTURE, URINE   COMPREHENSIVE METABOLIC PANEL   TROPONIN I   B-TYPE NATRIURETIC PEPTIDE   LACTIC ACID, PLASMA        All Lab Results:  Results for orders placed or performed during the hospital encounter of 01/25/19   Comprehensive metabolic panel   Result Value Ref Range    Sodium 141 136 - 145 mmol/L    Potassium 3.7 3.5 - 5.1 mmol/L    Chloride 106 95 - 110 mmol/L    CO2 26 23 - 29 mmol/L    Glucose 94 70 - 110 mg/dL    BUN, Bld 12 8 - 23 mg/dL    Creatinine 0.8 0.5 - 1.4 mg/dL    Calcium 9.3 8.7 - 10.5 mg/dL    Total Protein 7.0 6.0 - 8.4 g/dL    Albumin 3.5 3.5 - 5.2 g/dL    Total Bilirubin 0.8 0.1 - 1.0 mg/dL    Alkaline Phosphatase 122 55 - 135 U/L    AST 40 10 - 40 U/L    ALT 40 10 - 44 U/L    Anion Gap 9 8 - 16 mmol/L    eGFR if African American >60 >60 mL/min/1.73 m^2    eGFR if non African American >60 >60 mL/min/1.73 m^2   CBC auto differential   Result Value Ref Range    WBC 13.82 (H) 3.90 - 12.70 K/uL    RBC 4.43 4.00 - 5.40 M/uL    Hemoglobin 12.0 12.0 - 16.0 g/dL    Hematocrit 37.5 37.0 - 48.5 %    MCV 85 82 - 98 fL    MCH 27.1 27.0 - 31.0 pg    MCHC 32.0 32.0 - 36.0 g/dL    RDW 15.0 (H) 11.5 - 14.5 %    Platelets 317 150 - 350 K/uL    MPV 9.9 9.2 - 12.9 fL    Gran # (ANC) 10.5 (H) 1.8 - 7.7 K/uL    Lymph # 2.0 1.0 - 4.8 K/uL    Mono # 1.2 (H) 0.3 - 1.0 K/uL    Eos # 0.1 0.0 - 0.5 K/uL    Baso # 0.05 0.00 - 0.20 K/uL    Gran% 76.0 (H) 38.0 - 73.0 %    Lymph% 14.5 (L) 18.0 - 48.0 %    Mono% 8.4 4.0 - 15.0 %    Eosinophil% 0.7 0.0 - 8.0 %    Basophil% 0.4 0.0 - 1.9 %    Differential Method Automated    Troponin I   Result Value Ref Range    Troponin I <0.006 0.000 - 0.026 ng/mL   Brain natriuretic peptide   Result Value Ref Range    BNP 11 0 - 99  pg/mL   Urinalysis, Reflex to Urine Culture Urine, Clean Catch   Result Value Ref Range    Specimen UA Urine, Clean Catch     Color, UA Yellow Yellow, Straw, Lucero    Appearance, UA Clear Clear    pH, UA 8.0 5.0 - 8.0    Specific Gravity, UA 1.010 1.005 - 1.030    Protein, UA Trace (A) Negative    Glucose, UA Negative Negative    Ketones, UA Negative Negative    Bilirubin (UA) Negative Negative    Occult Blood UA Trace (A) Negative    Nitrite, UA Positive (A) Negative    Urobilinogen, UA 2.0-3.0 (A) <2.0 EU/dL    Leukocytes, UA 3+ (A) Negative   Lactic acid, plasma   Result Value Ref Range    Lactate (Lactic Acid) 0.9 0.5 - 2.2 mmol/L   Urinalysis Microscopic   Result Value Ref Range    RBC, UA 5 (H) 0 - 4 /hpf    WBC, UA >100 (H) 0 - 5 /hpf    Bacteria, UA Many (A) None-Occ /hpf    Squam Epithel, UA 5 /hpf    Microscopic Comment SEE COMMENT        Imaging Results:  Imaging Results          CT Chest Abdoment Pelvis With Contrast (Final result)  Result time 01/26/19 09:01:55    Final result by Sheng Mosqueda MD (01/26/19 09:01:55)                 Impression:      Moderate volume right pleural effusion. Passive atelectasis right lung base.  Suggestion of a 2 cm nodular type finding in the right middle lobe surrounded by some atelectasis. Recommend follow-up chest CT in 1 month to demonstrate stability or resolution.    Moderate right-sided hydroureteronephrosis likely secondary to multiple right retroperitoneal enlarged lymph nodes, as well as a right upper pelvic mass measuring 4.7 cm.  Suggestion of prominence of the right ovary in the pelvis measuring 5.5 x 3.8 cm. The left ovary is unremarkable. Recommend further evaluation with pelvic ultrasound on a nonemergent basis.    A few small anterior omental nodules are noted a suspicious for peritoneal carcinomatosis, for example left kimani abdominal nodule 2.6 x 1.5 cm (series 2, image 134) and left anterior kimani abdominal 4.9 x 4.1 cm mass (image  108).      Electronically signed by: Sheng Mosqueda  Date:    01/26/2019  Time:    09:01             Narrative:    EXAMINATION:  CT CHEST ABDOMEN PELVIS WITH CONTRAST (XPD)    CLINICAL HISTORY:  RLQ pain and SOB;    TECHNIQUE:  Low dose axial images, sagittal and coronal reformations were obtained from the thoracic inlet to the pubic symphysis following the IV administration of 75 mL of Omnipaque 350 .   oral contrast was not administered. All CT scans at this location are performed using dose modulation techniques as appropriate to a performed exam including the following: Automated exposure control; adjustment of the mA and/or kV  according to patient size.    COMPARISON:  None    FINDINGS:  Thyroid unremarkable.    Moderate volume right pleural effusion.  Passive atelectasis right lung base.  Suggestion of a 2 cm nodular type finding in the right middle lobe surrounded by some atelectasis.  Recommend follow-up chest CT in 1 month to demonstrate stability or resolution.    Liver, spleen unremarkable.    Moderate right-sided hydroureteronephrosis likely secondary to multiple right retroperitoneal enlarged lymph nodes, as well as a right upper pelvic mass measuring 4.7 cm, possibly ovarian in origin.  Suggestion of prominence of the right ovary in the pelvis measuring 5.5 x 3.8 cm.  The left ovary is unremarkable.  Recommend further evaluation with pelvic ultrasound on a nonemergent basis.    A few small anterior omental nodules are noted a suspicious for peritoneal carcinomatosis, for example left kimani abdominal nodule 2.6 x 1.5 cm (series 2, image 134) and left anterior kimani abdominal 4.9 x 4.1 cm mass (image 108).    Left kidney unremarkable.    No evidence of bowel obstruction.  No abscess.    Pancolonic diverticulosis without evidence of diverticulitis.    Trace free fluid in the pelvis.    Uterus likely surgically absent.                               X-Ray Chest PA And Lateral (Final result)  Result time  01/25/19 23:27:16    Final result by Inocencio Francis Jr., MD (01/25/19 23:27:16)                 Impression:      Pleural-parenchymal disease on the right may relate to pneumonia.  Follow-up to resolution is recommended.      Electronically signed by: Inocencio Francis Jr., MD  Date:    01/25/2019  Time:    23:27             Narrative:    EXAMINATION:  XR CHEST PA AND LATERAL    CLINICAL HISTORY:  Shortness of breath    TECHNIQUE:  PA and lateral views of the chest were performed.    COMPARISON:  PA and lateral from January 31, 2018.    FINDINGS:  There is new pleural parenchymal disease involving the right lower chest.  The more superior right lung and the left lung are clear.  No pneumothorax.  The trachea is midline.    The cardiac silhouette is normal in size. The hilar and mediastinal contours are unremarkable.    Bones are intact.                               RADIOLOGY REPORT (Final result)  Result time 01/29/19 14:07:25                 1:35 AM: Per [facility] radiology, pt's CT Chest results: Large right-sided pleural effusion with adjacent atelectasis.    1:35 AM: Per [facility] radiology, pt's CT Abdomen and Pelvis results:   1. Multiple soft tissue masses seen within the left hemiabdomen along the omentum and peritoneal reflections suggesting peritoneal carcinomatosis.  2. Mild to moderate right hydroureteronephrosis the level of the right lower lobe where there are multiple small soft tissue masses within the right hemipelvis which may represent enlarged nodes.  3. Multiple enlarged retroperitoneal and jovan hepatis lymph nodes concerning for harinder metastatic disease.    The EKG was ordered, reviewed, and independently interpreted by the ED provider.  Interpretation time: 2311  Rate: 99 BPM  Rhythm: normal sinus rhythm  Interpretation: Normal axis. Normal intervals. No significant ST depression or ST elevation. No STEMI.          The Emergency Provider reviewed the vital signs and test results, which are outlined  above.     ED Discussion     1:42 AM: Re-evaluated pt. I have discussed test results, shared treatment plan, and the need for admission with patient and family at bedside. Pt and family express understanding at this time and agree with all information. All questions answered. Pt and family have no further questions or concerns at this time. Pt is ready for admit.    1:55 AM: Discussed case with Dr. Marti ([facility]) who agrees with current care and management of pt and accepts admission.   Admitting Service: Hospital medicine   Admitting Physician: Dr. Marti  Admit to: Obs-tele            ED Medication(s):  Medications   azithromycin 500 mg in dextrose 5 % 250 mL IVPB (ready to mix system) (not administered)   sodium chloride 0.9% bolus 1,000 mL (not administered)   cefTRIAXone (ROCEPHIN) 2 g in dextrose 5 % 50 mL IVPB (2 g Intravenous New Bag 1/26/19 0042)   omnipaque 350 iohexol 75 mL (75 mLs Intravenous Given 1/26/19 0100)                  Medical Decision Making     Medical Decision Making:   Clinical Tests:   Lab Tests: Ordered and Reviewed  Radiological Study: Ordered and Reviewed  Medical Tests: Ordered and Reviewed  Initial concern with HPI and CXR was for PNA; patient give IVF and antibiotics; U/A showed UTI; CT showed pleural effusion and concern for metastatic disease; patient informed of all these findings and admitted to medicine for further evaluation and treatment             Scribe Attestation:   Scribe #1: I performed the above scribed service and the documentation accurately describes the services I performed. I attest to the accuracy of the note.     Attending:   Physician Attestation Statement for Scribe #1: I, Chandrakant Shultz MD, personally performed the services described in this documentation, as scribed by Liliam Hernandez, in my presence, and it is both accurate and complete.           Clinical Impression       1. Shortness of breath  2. Pleural Effusion  3. Acute cystitis without  hematuria  4. Abdominal mass, unspecified abdominal location  5. Pelvic mass    Disposition:   Disposition: Placed in Observation (Obs-tele)  Condition: Fair       Chandrakant Shultz MD  01/31/19 0703

## 2019-01-26 NOTE — ASSESSMENT & PLAN NOTE
New finding on CT abdomen done today.  No history of known malignancy.  Consult hematology oncology for evaluation/recommendations.

## 2019-01-26 NOTE — SUBJECTIVE & OBJECTIVE
Oncology Treatment Plan:   [No treatment plan]    Medications:  Continuous Infusions:  Scheduled Meds:   candesartan  16 mg Oral BID    And    hydroCHLOROthiazide  12.5 mg Oral Daily    cefTRIAXone (ROCEPHIN) IVPB  2 g Intravenous Q24H    pantoprazole  40 mg Oral Daily    rosuvastatin  10 mg Oral Daily     PRN Meds:acetaminophen, albuterol-ipratropium, aluminum-magnesium hydroxide-simethicone, cloNIDine, diphenhydrAMINE, guaifenesin 100 mg/5 ml, hydrALAZINE, ondansetron, oxyCODONE-acetaminophen, oxyCODONE-acetaminophen     Review of patient's allergies indicates:  No Known Allergies     Past Medical History:   Diagnosis Date    Anemia     Anxiety     Arthritis     knees    CHF (congestive heart failure)     Hyperlipemia     Hypertension     Neck pain      Past Surgical History:   Procedure Laterality Date    breast reduction  10/02/2018     SECTION      X 1    DILATION AND CURETTAGE OF UTERUS      HYSTERECTOMY      RALH for fibroids (still has ovaries)    PLACEMENT, TRANSOBTURATOR TAPE N/A 2014    [facility] at [facility]    ROBOTIC HYSTERECTOMY N/A 2014    Performed by [facility] at [facility] OR    TUBAL LIGATION       Family History     Problem Relation (Age of Onset)    Anesthesia problems Other    Breast cancer Maternal Aunt, Paternal Aunt    Colon cancer Brother    Ovarian cancer Paternal Aunt        Tobacco Use    Smoking status: Former Smoker     Packs/day: 1.00     Years: 25.00     Pack years: 25.00     Last attempt to quit: 10/1/2018     Years since quittin.3    Smokeless tobacco: Never Used    Tobacco comment: States started quit 2 months ago after 30 years   Substance and Sexual Activity    Alcohol use: No     Alcohol/week: 0.0 oz     Frequency: Never     Comment: occasionally    Drug use: No    Sexual activity: Not Currently     Partners: Male     Comment: hyst; mut monog       Review of Systems   Constitutional: Positive  for activity change and fatigue. Negative for appetite change, fever and unexpected weight change.   HENT: Negative for congestion, dental problem, ear discharge, facial swelling, mouth sores, nosebleeds, rhinorrhea, sinus pressure, sinus pain, sore throat, trouble swallowing and voice change.    Eyes: Negative.    Respiratory: Positive for shortness of breath (With exertion). Negative for apnea, cough, choking and chest tightness.    Cardiovascular: Negative for chest pain and leg swelling.   Gastrointestinal: Positive for abdominal distention and abdominal pain. Negative for anal bleeding, blood in stool and constipation.   Endocrine: Negative.    Genitourinary: Negative for difficulty urinating, dyspareunia, dysuria, flank pain, frequency, hematuria, pelvic pain and vaginal bleeding.   Musculoskeletal: Negative for arthralgias, back pain and neck pain.   Skin: Negative for pallor, rash and wound.   Allergic/Immunologic: Negative.    Neurological: Negative for dizziness, tremors, seizures, syncope, facial asymmetry, speech difficulty, light-headedness, numbness and headaches.   Hematological: Negative for adenopathy. Does not bruise/bleed easily.   Psychiatric/Behavioral: Negative for agitation, behavioral problems, confusion, decreased concentration, dysphoric mood and hallucinations. The patient is not nervous/anxious and is not hyperactive.      Objective:     Vital Signs (Most Recent):  Temp: 99.1 °F (37.3 °C) (01/26/19 0630)  Pulse: 98 (01/26/19 0802)  Resp: (!) 22 (01/26/19 0802)  BP: (!) 154/67 (01/26/19 0802)  SpO2: 97 % (01/26/19 0802) Vital Signs (24h Range):  Temp:  [98.9 °F (37.2 °C)-99.8 °F (37.7 °C)] 99.1 °F (37.3 °C)  Pulse:  [] 98  Resp:  [20-29] 22  SpO2:  [90 %-97 %] 97 %  BP: (139-171)/(67-82) 154/67     Weight: 108.6 kg (239 lb 5 oz)  Body mass index is 37.48 kg/m².  Body surface area is 2.27 meters squared.      Intake/Output Summary (Last 24 hours) at 1/26/2019 1135  Last data filed at  1/26/2019 0400  Gross per 24 hour   Intake 1300 ml   Output --   Net 1300 ml       Physical Exam   Constitutional: She is oriented to person, place, and time. She appears well-developed and well-nourished. No distress.   Well groomed   HENT:   Head: Normocephalic and atraumatic.   Right Ear: Tympanic membrane and ear canal normal.   Left Ear: Tympanic membrane and ear canal normal.   Nose: Nose normal. Right sinus exhibits no maxillary sinus tenderness and no frontal sinus tenderness. Left sinus exhibits no maxillary sinus tenderness and no frontal sinus tenderness.   Mouth/Throat: Oropharynx is clear and moist and mucous membranes are normal. No oral lesions. Normal dentition. No oropharyngeal exudate.   Eyes: Conjunctivae, EOM and lids are normal. Pupils are equal, round, and reactive to light. Lids are everted and swept, no foreign bodies found. No scleral icterus.   Neck: Trachea normal and normal range of motion. Neck supple. No JVD present. No tracheal deviation present. No thyroid mass and no thyromegaly present.   No crepitus   Cardiovascular: Normal rate, regular rhythm, normal heart sounds, intact distal pulses and normal pulses. Exam reveals no gallop and no friction rub.   No murmur heard.  Pulmonary/Chest: Effort normal. She has decreased breath sounds in the right lower field. She has no wheezes. She has no rhonchi. She has no rales. She exhibits no tenderness.   Abdominal: Soft. Normal appearance and bowel sounds are normal. She exhibits no distension and no mass. There is no hepatosplenomegaly. There is tenderness (To deep palpation). There is no rebound and no guarding.   Musculoskeletal: Normal range of motion. She exhibits no edema or tenderness.   Lymphadenopathy:     She has no cervical adenopathy.   Neurological: She is alert and oriented to person, place, and time. No cranial nerve deficit. She exhibits normal muscle tone. Coordination normal.   Skin: Skin is warm and dry. No rash noted. She  is not diaphoretic. No cyanosis or erythema. No pallor. Nails show no clubbing.   Psychiatric: She has a normal mood and affect. Her behavior is normal. Judgment and thought content normal.       Significant Labs:   CBC:   Recent Labs   Lab 01/25/19 2319   WBC 13.82*   HGB 12.0   HCT 37.5      , CMP:   Recent Labs   Lab 01/25/19 2319      K 3.7      CO2 26   GLU 94   BUN 12   CREATININE 0.8   CALCIUM 9.3   PROT 7.0   ALBUMIN 3.5   BILITOT 0.8   ALKPHOS 122   AST 40   ALT 40   ANIONGAP 9   EGFRNONAA >60   , Coagulation: No results for input(s): PT, INR, APTT in the last 48 hours., Haptoglobin: No results for input(s): HAPTOGLOBIN in the last 48 hours., Immunology: No results for input(s): SPEP, JULISSA, LOYD, FREELAMBDALI in the last 48 hours., LDH: No results for input(s): LDHCSF, BFSOURCE in the last 48 hours. and LFTs:   Recent Labs   Lab 01/25/19 2319   ALT 40   AST 40   ALKPHOS 122   BILITOT 0.8   PROT 7.0   ALBUMIN 3.5       Diagnostic Results:  I have reviewed all pertinent imaging results/findings within the past 24 hours.

## 2019-01-26 NOTE — HOSPITAL COURSE
I attempted to do ultrasound-guided thoracentesis after ultrasound of the right chest in the emergency room showed small right pleural effusion with septations.unsuccessful.  1/27 seen and examined.  Comfortable in bed.  On room air O2 sat 92%.  Mild SOB and abdominal distention.  1/28: Await Biopsy: Right effusion, Right hydronephrosis  1/29: Effusion 860 mls: Differential Lymphocytic

## 2019-01-26 NOTE — ASSESSMENT & PLAN NOTE
Large right-sided pleural effusion of unknown etiology.  New finding of peritoneal carcinomatosis in the abdomen.  Consult Pulmonary for thoracentesis.  Supplemental oxygen to maintain saturations greater than 92%.  Bronchodilators as needed.

## 2019-01-26 NOTE — CONSULTS
[facility] -   Pulmonology  Consult Note    Patient Name: Casie Gaines  MRN: [id number]  Admission Date: 2019  Hospital Length of Stay: 0 days  Code Status: No Order  Attending Physician: Raheem Marti MD  Primary Care Provider: Rossana Ang MD   Principal Problem: Pleural effusion, right      Subjective:     HPI:  63-year-old female patient presenting to the hospital for worsening shortness of breath. Twenty-five pack year smoking history.  Has family history of brothers with colon cancer and lung cancer .  No personal history of cancer.  Complains also of abdominal distention and generalized weakness.    Past Medical History:   Diagnosis Date    Anemia     Anxiety     Arthritis     knees    CHF (congestive heart failure)     Hyperlipemia     Hypertension     Neck pain      Past Surgical History:   Procedure Laterality Date    breast reduction  10/02/2018     SECTION      X 1    DILATION AND CURETTAGE OF UTERUS      HYSTERECTOMY      RALH for fibroids (still has ovaries)    PLACEMENT, TRANSOBTURATOR TAPE N/A 2014    Performed by [facility] at [facility]    ROBOTIC HYSTERECTOMY N/A 2014    Performed by [facility] at [facility]    TUBAL LIGATION         Review of patient's allergies indicates:  No Known Allergies    Family History     Problem Relation (Age of Onset)    Anesthesia problems Other    Breast cancer Maternal Aunt, Paternal Aunt    Colon cancer Brother    Ovarian cancer Paternal Aunt        Tobacco Use    Smoking status: Former Smoker     Packs/day: 1.00     Years: 25.00     Pack years: 25.00     Last attempt to quit: 10/1/2018     Years since quittin.3    Smokeless tobacco: Never Used    Tobacco comment: States started quit 2 months ago after 30 years   Substance and Sexual Activity    Alcohol use: No     Alcohol/week: 0.0 oz     Frequency: Never     Comment: occasionally    Drug use: No    Sexual activity: Not  Currently     Partners: Male     Comment: hyst; mut monog         Review of Systems   Constitutional: Positive for fatigue. Negative for chills and fever.   HENT: Negative for drooling and nosebleeds.    Eyes: Negative for discharge.   Respiratory: Positive for cough and shortness of breath. Negative for stridor.    Cardiovascular: Negative for chest pain.   Gastrointestinal: Positive for abdominal distention. Negative for blood in stool.   Endocrine: Negative for cold intolerance.   Genitourinary: Negative for hematuria.   Musculoskeletal: Positive for neck pain. Negative for gait problem.   Skin: Negative for wound.   Allergic/Immunologic: Negative for immunocompromised state.   Neurological: Negative for seizures.   Hematological: Positive for adenopathy.   Psychiatric/Behavioral: Negative for behavioral problems. The patient is nervous/anxious.      Objective:     Vital Signs (Most Recent):  Temp: 98.4 °F (36.9 °C) (01/26/19 1229)  Pulse: 94 (01/26/19 1229)  Resp: 18 (01/26/19 1229)  BP: 137/65 (01/26/19 1229)  SpO2: 95 % (01/26/19 1229) Vital Signs (24h Range):  Temp:  [98.4 °F (36.9 °C)-99.8 °F (37.7 °C)] 98.4 °F (36.9 °C)  Pulse:  [] 94  Resp:  [18-29] 18  SpO2:  [90 %-97 %] 95 %  BP: (137-171)/(65-82) 137/65     Weight: 108.6 kg (239 lb 5 oz)  Body mass index is 37.48 kg/m².      Intake/Output Summary (Last 24 hours) at 1/26/2019 1750  Last data filed at 1/26/2019 1700  Gross per 24 hour   Intake 1660 ml   Output --   Net 1660 ml       Physical Exam   Constitutional: She is oriented to person, place, and time. She appears well-developed and well-nourished. No distress.   HENT:   Head: Normocephalic and atraumatic.   Eyes: EOM are normal.   Neck: Neck supple.   Cardiovascular: Normal rate and regular rhythm.   Pulmonary/Chest: Effort normal. No respiratory distress.   Breath sounds decreased on the right side.   Abdominal: Soft. She exhibits distension. There is no tenderness.   Musculoskeletal: She  exhibits no edema.   Neurological: She is alert and oriented to person, place, and time.   Skin: Skin is warm.   Psychiatric: She has a normal mood and affect. Her behavior is normal. Judgment and thought content normal.   Nursing note and vitals reviewed.    Vents:     Lines/Drains/Airways     Drain                 Urethral Catheter 01/26/19 1141 Latex 16 Fr. less than 1 day          Peripheral Intravenous Line                 Peripheral IV - Single Lumen 01/25/19 2318 Left Antecubital less than 1 day                Significant Labs:    CBC/Anemia Profile:  Recent Labs   Lab 01/25/19 2319   WBC 13.82*   HGB 12.0   HCT 37.5      MCV 85   RDW 15.0*        Chemistries:  Recent Labs   Lab 01/25/19 2319      K 3.7      CO2 26   BUN 12   CREATININE 0.8   CALCIUM 9.3   ALBUMIN 3.5   PROT 7.0   BILITOT 0.8   ALKPHOS 122   ALT 40   AST 40       Significant Imaging:   CT: I have reviewed all pertinent results/findings within the past 24 hours and my personal findings are:  Right-sided pleural effusion with atelectasis.  Abdominal mass likely right ovarian mass with abdominal lymphadenopathy.  CXR: I have reviewed all pertinent results/findings within the past 24 hours and my personal findings are:  Right-sided pleural effusion.      ABG  No results for input(s): PH, PO2, PCO2, HCO3, BE in the last 168 hours.  Assessment/Plan:     * Pleural effusion, right    IR consult for thoracentesis or abdominal mass biopsy.     Abdominal mass    Check tumor markers.  IR consult for biopsy.     UTI (urinary tract infection)    1/26 urine culture pending.  IV Rocephin.  UA positive nitrites and WBC.     History of total hysterectomy with bilateral salpingo-oophorectomy (BSO)    Done for uterine fibroid in the past.           Thank you for your consult. I will follow-up with patient. Please contact us if you have any additional questions.     Arnaldo Mathur MD  Pulmonology  [facility] -

## 2019-01-26 NOTE — ED NOTES
Armband checked, patient verified. Verified by spelling and stated name on armband along with .   LOC: The patient is awake, alert and aware of environment with an appropriate affect, the patient is oriented x 3 and speaking appropriately.  APPEARANCE: Patient resting comfortably and in no acute distress, patient is clean and well groomed  SKIN: The skin is warm and dry, color consistent with ethnicity, patient has normal skin turgor and moist mucus membranes, no breakdown or bruising noted.   MUSCULOSKELETAL: Patient moving all extremities to command  RESPIRATORY: Airway is open and patent, respirations are regular, even and non labored.  CARDIAC: Patient has a normal rate, no periphreal edema noted, capillary refill < 3 seconds.  ABDOMEN: Soft and non tender to palpation.  Pt states has been having RLQ pain with radiation to flank area since yesterday. Also c/o SOB with exertion for several weeks. Denies fever, cough, dysuria, n/v.

## 2019-01-27 LAB
ALBUMIN SERPL BCP-MCNC: 3.2 G/DL
ALP SERPL-CCNC: 111 U/L
ALT SERPL W/O P-5'-P-CCNC: 35 U/L
ANION GAP SERPL CALC-SCNC: 8 MMOL/L
AST SERPL-CCNC: 36 U/L
BASOPHILS # BLD AUTO: 0.07 K/UL
BASOPHILS NFR BLD: 0.6 %
BILIRUB SERPL-MCNC: 0.7 MG/DL
BUN SERPL-MCNC: 8 MG/DL
CALCIUM SERPL-MCNC: 9.2 MG/DL
CANCER AG125 SERPL-ACNC: 2740 U/ML
CANCER AG19-9 SERPL-ACNC: 39 U/ML
CEA SERPL-MCNC: 65.4 NG/ML
CHLORIDE SERPL-SCNC: 106 MMOL/L
CO2 SERPL-SCNC: 26 MMOL/L
CREAT SERPL-MCNC: 0.8 MG/DL
DIFFERENTIAL METHOD: ABNORMAL
EOSINOPHIL # BLD AUTO: 0.3 K/UL
EOSINOPHIL NFR BLD: 2 %
ERYTHROCYTE [DISTWIDTH] IN BLOOD BY AUTOMATED COUNT: 15.2 %
EST. GFR  (AFRICAN AMERICAN): >60 ML/MIN/1.73 M^2
EST. GFR  (NON AFRICAN AMERICAN): >60 ML/MIN/1.73 M^2
GLUCOSE SERPL-MCNC: 97 MG/DL
HCT VFR BLD AUTO: 37.3 %
HGB BLD-MCNC: 11.9 G/DL
LYMPHOCYTES # BLD AUTO: 1.5 K/UL
LYMPHOCYTES NFR BLD: 12.2 %
MAGNESIUM SERPL-MCNC: 2 MG/DL
MCH RBC QN AUTO: 27.4 PG
MCHC RBC AUTO-ENTMCNC: 31.9 G/DL
MCV RBC AUTO: 86 FL
MONOCYTES # BLD AUTO: 1.1 K/UL
MONOCYTES NFR BLD: 9 %
NEUTROPHILS # BLD AUTO: 9.3 K/UL
NEUTROPHILS NFR BLD: 76.2 %
PHOSPHATE SERPL-MCNC: 3.1 MG/DL
PLATELET # BLD AUTO: 289 K/UL
PMV BLD AUTO: 10.5 FL
POTASSIUM SERPL-SCNC: 3.5 MMOL/L
PROT SERPL-MCNC: 6.8 G/DL
RBC # BLD AUTO: 4.35 M/UL
SODIUM SERPL-SCNC: 140 MMOL/L
WBC # BLD AUTO: 12.24 K/UL

## 2019-01-27 PROCEDURE — 99215 PR OFFICE/OUTPT VISIT, EST, LEVL V, 40-54 MIN: ICD-10-PCS | Mod: ,,, | Performed by: INTERNAL MEDICINE

## 2019-01-27 PROCEDURE — 63600175 PHARM REV CODE 636 W HCPCS: Performed by: INTERNAL MEDICINE

## 2019-01-27 PROCEDURE — 96376 TX/PRO/DX INJ SAME DRUG ADON: CPT

## 2019-01-27 PROCEDURE — 80053 COMPREHEN METABOLIC PANEL: CPT

## 2019-01-27 PROCEDURE — 99215 OFFICE O/P EST HI 40 MIN: CPT | Mod: ,,, | Performed by: INTERNAL MEDICINE

## 2019-01-27 PROCEDURE — 85025 COMPLETE CBC W/AUTO DIFF WBC: CPT

## 2019-01-27 PROCEDURE — 36415 COLL VENOUS BLD VENIPUNCTURE: CPT

## 2019-01-27 PROCEDURE — G0378 HOSPITAL OBSERVATION PER HR: HCPCS

## 2019-01-27 PROCEDURE — 84100 ASSAY OF PHOSPHORUS: CPT

## 2019-01-27 PROCEDURE — 25000003 PHARM REV CODE 250: Performed by: INTERNAL MEDICINE

## 2019-01-27 PROCEDURE — 83735 ASSAY OF MAGNESIUM: CPT

## 2019-01-27 RX ADMIN — ROSUVASTATIN CALCIUM 10 MG: 10 TABLET, FILM COATED ORAL at 08:01

## 2019-01-27 RX ADMIN — PANTOPRAZOLE SODIUM 40 MG: 40 TABLET, DELAYED RELEASE ORAL at 08:01

## 2019-01-27 RX ADMIN — CANDESARTAN CILEXETIL 16 MG: 16 TABLET ORAL at 08:01

## 2019-01-27 RX ADMIN — HYDROCHLOROTHIAZIDE 12.5 MG: 12.5 TABLET ORAL at 08:01

## 2019-01-27 RX ADMIN — CEFTRIAXONE 2 G: 2 INJECTION, SOLUTION INTRAVENOUS at 05:01

## 2019-01-27 NOTE — PROGRESS NOTES
[facility] -   Hematology/Oncology  Progress Note    Patient Name: Casie Gaines  Admission Date: 1/25/2019  Hospital Length of Stay: 0 days  Code Status: No Order     Subjective:     HPI:    63-year-old female with a history of CHF, dyslipidemia, hypertension who presented to the emergency room with progressive shortness of breath over the previous month.  She also reports significant abdominal discomfort with slight distention.  01/25/2019 CT scan demonstrated numerous soft tissue masses within the left a abdominal cavity consistent with peritoneal carcinomatosis.  The patient has no personal history of malignancy and underwent hysterectomy in 2006 for fibroid uterus.  Imaging also demonstrated large right pleural effusion for which thoracentesis is planned.    Patient has a family history of colon cancer in her brother at age 62 and lung cancer in her brother at age 64  No history of breast or ovarian cancer    Interval History:   No acute events overnight    Oncology Treatment Plan:   [No treatment plan]    Medications:  Continuous Infusions:  Scheduled Meds:   candesartan  16 mg Oral BID    And    hydroCHLOROthiazide  12.5 mg Oral Daily    cefTRIAXone (ROCEPHIN) IVPB  2 g Intravenous Q24H    pantoprazole  40 mg Oral Daily    rosuvastatin  10 mg Oral Daily     PRN Meds:acetaminophen, albuterol-ipratropium, aluminum-magnesium hydroxide-simethicone, cloNIDine, diphenhydrAMINE, guaifenesin 100 mg/5 ml, hydrALAZINE, ondansetron, oxyCODONE-acetaminophen, oxyCODONE-acetaminophen     Review of Systems   Constitutional: Positive for activity change and fatigue. Negative for appetite change, fever and unexpected weight change.   HENT: Negative for congestion, dental problem, ear discharge, facial swelling, mouth sores, nosebleeds, rhinorrhea, sinus pressure, sinus pain, sore throat, trouble swallowing and voice change.    Eyes: Negative.    Respiratory: Positive for shortness of breath (With  exertion). Negative for apnea, cough, choking and chest tightness.    Cardiovascular: Negative for chest pain and leg swelling.   Gastrointestinal: Positive for abdominal distention and abdominal pain. Negative for anal bleeding, blood in stool and constipation.   Endocrine: Negative.    Genitourinary: Negative for difficulty urinating, dyspareunia, dysuria, flank pain, frequency, hematuria, pelvic pain and vaginal bleeding.   Musculoskeletal: Negative for arthralgias, back pain and neck pain.   Skin: Negative for pallor, rash and wound.   Allergic/Immunologic: Negative.    Neurological: Negative for dizziness, tremors, seizures, syncope, facial asymmetry, speech difficulty, light-headedness, numbness and headaches.   Hematological: Negative for adenopathy. Does not bruise/bleed easily.   Psychiatric/Behavioral: Negative for agitation, behavioral problems, confusion, decreased concentration, dysphoric mood and hallucinations. The patient is not nervous/anxious and is not hyperactive.      Objective:     Vital Signs (Most Recent):  Temp: 98.7 °F (37.1 °C) (01/27/19 0733)  Pulse: 90 (01/27/19 0941)  Resp: 16 (01/27/19 0733)  BP: 126/61 (01/27/19 0733)  SpO2: (!) 92 % (01/27/19 0733) Vital Signs (24h Range):  Temp:  [96 °F (35.6 °C)-98.8 °F (37.1 °C)] 98.7 °F (37.1 °C)  Pulse:  [79-99] 90  Resp:  [16-18] 16  SpO2:  [91 %-95 %] 92 %  BP: (126-137)/(61-70) 126/61     Weight: 108.6 kg (239 lb 5 oz)  Body mass index is 37.48 kg/m².  Body surface area is 2.27 meters squared.      Intake/Output Summary (Last 24 hours) at 1/27/2019 1031  Last data filed at 1/27/2019 0422  Gross per 24 hour   Intake 580 ml   Output 700 ml   Net -120 ml       Physical Exam   Constitutional: She is oriented to person, place, and time. She appears well-developed and well-nourished. No distress.   Well groomed   HENT:   Head: Normocephalic and atraumatic.   Right Ear: Tympanic membrane and ear canal normal.   Left Ear: Tympanic membrane and ear  canal normal.   Nose: Nose normal. Right sinus exhibits no maxillary sinus tenderness and no frontal sinus tenderness. Left sinus exhibits no maxillary sinus tenderness and no frontal sinus tenderness.   Mouth/Throat: Oropharynx is clear and moist and mucous membranes are normal. No oral lesions. Normal dentition. No oropharyngeal exudate.   Eyes: Conjunctivae, EOM and lids are normal. Pupils are equal, round, and reactive to light. Lids are everted and swept, no foreign bodies found.   Neck: Trachea normal and normal range of motion. Neck supple. No tracheal deviation present. No thyroid mass and no thyromegaly present.   No crepitus   Cardiovascular: Normal rate, regular rhythm, normal heart sounds, intact distal pulses and normal pulses. Exam reveals no gallop and no friction rub.   No murmur heard.  Pulmonary/Chest: Effort normal. No stridor. No respiratory distress. She has decreased breath sounds in the right lower field. She has no wheezes. She has no rhonchi. She has no rales. She exhibits no tenderness.   Abdominal: Soft. Normal appearance and bowel sounds are normal. She exhibits no distension and no mass. There is no hepatosplenomegaly. There is tenderness (To deep palpation). There is no rebound and no guarding. No hernia.   Musculoskeletal: Normal range of motion. She exhibits no edema or deformity.   Neurological: She is alert and oriented to person, place, and time. She displays normal reflexes. No cranial nerve deficit. She exhibits normal muscle tone. Coordination normal.   Skin: Skin is warm, dry and intact. Capillary refill takes less than 2 seconds. No rash noted. She is not diaphoretic. No cyanosis. No pallor. Nails show no clubbing.   Psychiatric: She has a normal mood and affect. Her behavior is normal. Judgment and thought content normal.       Significant Labs:   CBC:   Recent Labs   Lab 01/25/19  2319 01/27/19  0423   WBC 13.82* 12.24   HGB 12.0 11.9*   HCT 37.5 37.3    289   , CMP:    Recent Labs   Lab 01/25/19 2319 01/27/19  0423    140   K 3.7 3.5    106   CO2 26 26   GLU 94 97   BUN 12 8   CREATININE 0.8 0.8   CALCIUM 9.3 9.2   PROT 7.0 6.8   ALBUMIN 3.5 3.2*   BILITOT 0.8 0.7   ALKPHOS 122 111   AST 40 36   ALT 40 35   ANIONGAP 9 8   EGFRNONAA >60 >60   , Coagulation: No results for input(s): PT, INR, APTT in the last 48 hours., Haptoglobin: No results for input(s): HAPTOGLOBIN in the last 48 hours., Immunology: No results for input(s): SPEP, JULISSA, LOYD, FREELAMBDALI in the last 48 hours., LDH: No results for input(s): LDHCSF, BFSOURCE in the last 48 hours. and LFTs:   Recent Labs   Lab 01/25/19 2319 01/27/19  0423   ALT 40 35   AST 40 36   ALKPHOS 122 111   BILITOT 0.8 0.7   PROT 7.0 6.8   ALBUMIN 3.5 3.2*       Diagnostic Results:  I have reviewed all pertinent imaging results/findings within the past 24 hours.    Assessment/Plan:            Primary peritoneal carcinomatosis    Overall imaging consistent with metastatic malignancy/peritoneal carcinomatosis.  Planning CT-guided biopsy of 1 of the abdominal masses for tissue diagnosis and provide tissue for molecular studies.    --tumor markers pending  --planningCT-guided biopsy of 1 of the abdominal masses  --supportive care  --patient may be discharged with outpatient follow-up following biopsy           Thank you for your consult. I will follow-up with patient. Please contact us if you have any additional questions.     Will Trevizo Jr, MD  Hematology/Oncology  [facility]

## 2019-01-27 NOTE — ASSESSMENT & PLAN NOTE
Overall imaging consistent with metastatic malignancy/peritoneal carcinomatosis.  Planning CT-guided biopsy of 1 of the abdominal masses for tissue diagnosis and provide tissue for molecular studies.    --tumor markers pending  --planningCT-guided biopsy of 1 of the abdominal masses  --supportive care  --patient may be discharged with outpatient follow-up following biopsy

## 2019-01-27 NOTE — ASSESSMENT & PLAN NOTE
1/26 urine culture pending.  IV Rocephin.  UA positive nitrites and WBC.  1/27 gram-negative rods.  IV Rocephin.  Follow cultures.

## 2019-01-27 NOTE — SUBJECTIVE & OBJECTIVE
Interval History:   Review of Systems   Constitutional: Positive for malaise/fatigue. Negative for chills and fever.   HENT: Negative for hearing loss and nosebleeds.    Eyes: Negative for discharge.   Respiratory: Positive for shortness of breath. Negative for hemoptysis.    Cardiovascular: Positive for orthopnea. Negative for chest pain.   Gastrointestinal: Positive for abdominal pain. Negative for blood in stool.   Genitourinary: Negative for hematuria.   Musculoskeletal: Positive for back pain and joint pain. Negative for falls.   Skin: Negative for itching.   Neurological: Positive for weakness. Negative for seizures and loss of consciousness.   Endo/Heme/Allergies: Negative for polydipsia.   Psychiatric/Behavioral: Negative for memory loss.       Objective:     Vital Signs (Most Recent):  Temp: 98.2 °F (36.8 °C) (01/27/19 1105)  Pulse: 87 (01/27/19 1300)  Resp: 16 (01/27/19 1105)  BP: 136/78 (01/27/19 1105)  SpO2: (!) 93 % (01/27/19 1105) Vital Signs (24h Range):  Temp:  [96 °F (35.6 °C)-98.8 °F (37.1 °C)] 98.2 °F (36.8 °C)  Pulse:  [79-99] 87  Resp:  [16-17] 16  SpO2:  [91 %-94 %] 93 %  BP: (126-137)/(61-78) 136/78     Weight: 108.6 kg (239 lb 5 oz)  Body mass index is 37.48 kg/m².      Intake/Output Summary (Last 24 hours) at 1/27/2019 1501  Last data filed at 1/27/2019 1300  Gross per 24 hour   Intake 820 ml   Output 700 ml   Net 120 ml       Physical Exam   Constitutional: She is oriented to person, place, and time. She appears well-developed and well-nourished. No distress.   HENT:   Head: Normocephalic and atraumatic.   Eyes: EOM are normal.   Neck: Neck supple.   Cardiovascular: Normal rate, regular rhythm and normal heart sounds.   Pulmonary/Chest: Effort normal. No respiratory distress.   Breath sounds decreased on the right side.   Abdominal: Soft. She exhibits distension. There is no tenderness.   Musculoskeletal: She exhibits no edema.   Neurological: She is alert and oriented to person, place, and  time.   Skin: Skin is warm. No pallor.   Psychiatric: She has a normal mood and affect. Her behavior is normal. Judgment and thought content normal.   Nursing note and vitals reviewed.      Vents:       Lines/Drains/Airways     Peripheral Intravenous Line                 Peripheral IV - Single Lumen 01/25/19 2318 Left Antecubital 1 day                Significant Labs:    CBC/Anemia Profile:  Recent Labs   Lab 01/25/19 2319 01/27/19  0423   WBC 13.82* 12.24   HGB 12.0 11.9*   HCT 37.5 37.3    289   MCV 85 86   RDW 15.0* 15.2*        Chemistries:  Recent Labs   Lab 01/25/19 2319 01/27/19  0423    140   K 3.7 3.5    106   CO2 26 26   BUN 12 8   CREATININE 0.8 0.8   CALCIUM 9.3 9.2   ALBUMIN 3.5 3.2*   PROT 7.0 6.8   BILITOT 0.8 0.7   ALKPHOS 122 111   ALT 40 35   AST 40 36   MG  --  2.0   PHOS  --  3.1     equal 2740     CA 1 9-9 and CA pending.    CT chest abdomen pelvis  Moderate volume right pleural effusion.  Passive atelectasis right lung base.  Suggestion of a 2 cm nodular type finding in the right middle lobe surrounded by some atelectasis.  Recommend follow-up chest CT in 1 month to demonstrate stability or resolution.    Liver, spleen unremarkable.    Moderate right-sided hydroureteronephrosis likely secondary to multiple right retroperitoneal enlarged lymph nodes, as well as a right upper pelvic mass measuring 4.7 cm, possibly ovarian in origin.  Suggestion of prominence of the right ovary in the pelvis measuring 5.5 x 3.8 cm.  The left ovary is unremarkable.  Recommend further evaluation with pelvic ultrasound on a nonemergent basis.    A few small anterior omental nodules are noted a suspicious for peritoneal carcinomatosis, for example left kimani abdominal nodule 2.6 x 1.5 cm (series 2, image 134) and left anterior kimani abdominal 4.9 x 4.1 cm mass (image 108).    Left kidney unremarkable.    No evidence of bowel obstruction.  No abscess.    Significant Imaging:  CXR: I have  reviewed all pertinent results/findings within the past 24 hours and my personal findings are:  Right-sided effusion and atelectasis.

## 2019-01-27 NOTE — PROGRESS NOTES
[facility] -   Pulmonology  Progress Note    Patient Name: Casie Gaines  MRN: [id number]  Admission Date: 1/25/2019  Hospital Length of Stay: 0 days  Code Status: No Order  Attending Provider: Padmaja Larson MD  Primary Care Provider: Rossana Ang MD   Principal Problem: Pleural effusion, right    Subjective:     Interval History:   Review of Systems   Constitutional: Positive for malaise/fatigue. Negative for chills and fever.   HENT: Negative for hearing loss and nosebleeds.    Eyes: Negative for discharge.   Respiratory: Positive for shortness of breath. Negative for hemoptysis.    Cardiovascular: Positive for orthopnea. Negative for chest pain.   Gastrointestinal: Positive for abdominal pain. Negative for blood in stool.   Genitourinary: Negative for hematuria.   Musculoskeletal: Positive for back pain and joint pain. Negative for falls.   Skin: Negative for itching.   Neurological: Positive for weakness. Negative for seizures and loss of consciousness.   Endo/Heme/Allergies: Negative for polydipsia.   Psychiatric/Behavioral: Negative for memory loss.       Objective:     Vital Signs (Most Recent):  Temp: 98.2 °F (36.8 °C) (01/27/19 1105)  Pulse: 87 (01/27/19 1300)  Resp: 16 (01/27/19 1105)  BP: 136/78 (01/27/19 1105)  SpO2: (!) 93 % (01/27/19 1105) Vital Signs (24h Range):  Temp:  [96 °F (35.6 °C)-98.8 °F (37.1 °C)] 98.2 °F (36.8 °C)  Pulse:  [79-99] 87  Resp:  [16-17] 16  SpO2:  [91 %-94 %] 93 %  BP: (126-137)/(61-78) 136/78     Weight: 108.6 kg (239 lb 5 oz)  Body mass index is 37.48 kg/m².      Intake/Output Summary (Last 24 hours) at 1/27/2019 1501  Last data filed at 1/27/2019 1300  Gross per 24 hour   Intake 820 ml   Output 700 ml   Net 120 ml       Physical Exam   Constitutional: She is oriented to person, place, and time. She appears well-developed and well-nourished. No distress.   HENT:   Head: Normocephalic and atraumatic.   Eyes: EOM are normal.   Neck: Neck supple.    Cardiovascular: Normal rate, regular rhythm and normal heart sounds.   Pulmonary/Chest: Effort normal. No respiratory distress.   Breath sounds decreased on the right side.   Abdominal: Soft. She exhibits distension. There is no tenderness.   Musculoskeletal: She exhibits no edema.   Neurological: She is alert and oriented to person, place, and time.   Skin: Skin is warm. No pallor.   Psychiatric: She has a normal mood and affect. Her behavior is normal. Judgment and thought content normal.   Nursing note and vitals reviewed.      Vents:       Lines/Drains/Airways     Peripheral Intravenous Line                 Peripheral IV - Single Lumen 01/25/19 2318 Left Antecubital 1 day                Significant Labs:    CBC/Anemia Profile:  Recent Labs   Lab 01/25/19 2319 01/27/19  0423   WBC 13.82* 12.24   HGB 12.0 11.9*   HCT 37.5 37.3    289   MCV 85 86   RDW 15.0* 15.2*        Chemistries:  Recent Labs   Lab 01/25/19 2319 01/27/19  0423    140   K 3.7 3.5    106   CO2 26 26   BUN 12 8   CREATININE 0.8 0.8   CALCIUM 9.3 9.2   ALBUMIN 3.5 3.2*   PROT 7.0 6.8   BILITOT 0.8 0.7   ALKPHOS 122 111   ALT 40 35   AST 40 36   MG  --  2.0   PHOS  --  3.1     equal 2740     CA 1 9-9 and CA pending.    CT chest abdomen pelvis  Moderate volume right pleural effusion.  Passive atelectasis right lung base.  Suggestion of a 2 cm nodular type finding in the right middle lobe surrounded by some atelectasis.  Recommend follow-up chest CT in 1 month to demonstrate stability or resolution.    Liver, spleen unremarkable.    Moderate right-sided hydroureteronephrosis likely secondary to multiple right retroperitoneal enlarged lymph nodes, as well as a right upper pelvic mass measuring 4.7 cm, possibly ovarian in origin.  Suggestion of prominence of the right ovary in the pelvis measuring 5.5 x 3.8 cm.  The left ovary is unremarkable.  Recommend further evaluation with pelvic ultrasound on a nonemergent basis.    A  few small anterior omental nodules are noted a suspicious for peritoneal carcinomatosis, for example left kimani abdominal nodule 2.6 x 1.5 cm (series 2, image 134) and left anterior kimani abdominal 4.9 x 4.1 cm mass (image 108).    Left kidney unremarkable.    No evidence of bowel obstruction.  No abscess.    Significant Imaging:  CXR: I have reviewed all pertinent results/findings within the past 24 hours and my personal findings are:  Right-sided effusion and atelectasis.         ABG  No results for input(s): PH, PO2, PCO2, HCO3, BE in the last 168 hours.  Assessment/Plan:     * Pleural effusion, right    IR consult for thoracentesis or abdominal mass biopsy.  1/27 same.     Abdominal mass    Check tumor markers.  IR consult for biopsy.  Reports 1/27 abdominal mass biopsy.  IR consult placed.     UTI (urinary tract infection)    1/26 urine culture pending.  IV Rocephin.  UA positive nitrites and WBC.  1/27 gram-negative rods.  IV Rocephin.  Follow cultures.     Primary peritoneal carcinomatosis    1/27  elevated.  Likely ovarian primary.  Oncology follow-up.            Arnaldo Mathur MD  Pulmonology  [facility] -

## 2019-01-27 NOTE — ASSESSMENT & PLAN NOTE
New finding on CT abdomen:  Peritoneal carcinoma  No history of known malignancy.    hematology oncology:  -tumor markers pending  --planningCT-guided biopsy of 1 of the abdominal masses  --supportive care  --patient may be discharged with outpatient follow-up following biopsy

## 2019-01-27 NOTE — ASSESSMENT & PLAN NOTE
Check tumor markers.  IR consult for biopsy.  Reports 1/27 abdominal mass biopsy.  IR consult placed.

## 2019-01-27 NOTE — PLAN OF CARE
Problem: Adult Inpatient Plan of Care  Goal: Plan of Care Review  Outcome: Ongoing (interventions implemented as appropriate)  Patient admitted and oriented to hospital unit. Safety education provided. IV antibiotic treatments initiated. Patient's family frequently at bedside. Will continue to monitor and treat.

## 2019-01-27 NOTE — SUBJECTIVE & OBJECTIVE
Interval History:   No acute events overnight    Oncology Treatment Plan:   [No treatment plan]    Medications:  Continuous Infusions:  Scheduled Meds:   candesartan  16 mg Oral BID    And    hydroCHLOROthiazide  12.5 mg Oral Daily    cefTRIAXone (ROCEPHIN) IVPB  2 g Intravenous Q24H    pantoprazole  40 mg Oral Daily    rosuvastatin  10 mg Oral Daily     PRN Meds:acetaminophen, albuterol-ipratropium, aluminum-magnesium hydroxide-simethicone, cloNIDine, diphenhydrAMINE, guaifenesin 100 mg/5 ml, hydrALAZINE, ondansetron, oxyCODONE-acetaminophen, oxyCODONE-acetaminophen     Review of Systems   Constitutional: Positive for activity change and fatigue. Negative for appetite change, fever and unexpected weight change.   HENT: Negative for congestion, dental problem, ear discharge, facial swelling, mouth sores, nosebleeds, rhinorrhea, sinus pressure, sinus pain, sore throat, trouble swallowing and voice change.    Eyes: Negative.    Respiratory: Positive for shortness of breath (With exertion). Negative for apnea, cough, choking and chest tightness.    Cardiovascular: Negative for chest pain and leg swelling.   Gastrointestinal: Positive for abdominal distention and abdominal pain. Negative for anal bleeding, blood in stool and constipation.   Endocrine: Negative.    Genitourinary: Negative for difficulty urinating, dyspareunia, dysuria, flank pain, frequency, hematuria, pelvic pain and vaginal bleeding.   Musculoskeletal: Negative for arthralgias, back pain and neck pain.   Skin: Negative for pallor, rash and wound.   Allergic/Immunologic: Negative.    Neurological: Negative for dizziness, tremors, seizures, syncope, facial asymmetry, speech difficulty, light-headedness, numbness and headaches.   Hematological: Negative for adenopathy. Does not bruise/bleed easily.   Psychiatric/Behavioral: Negative for agitation, behavioral problems, confusion, decreased concentration, dysphoric mood and hallucinations. The patient  is not nervous/anxious and is not hyperactive.      Objective:     Vital Signs (Most Recent):  Temp: 98.7 °F (37.1 °C) (01/27/19 0733)  Pulse: 90 (01/27/19 0941)  Resp: 16 (01/27/19 0733)  BP: 126/61 (01/27/19 0733)  SpO2: (!) 92 % (01/27/19 0733) Vital Signs (24h Range):  Temp:  [96 °F (35.6 °C)-98.8 °F (37.1 °C)] 98.7 °F (37.1 °C)  Pulse:  [79-99] 90  Resp:  [16-18] 16  SpO2:  [91 %-95 %] 92 %  BP: (126-137)/(61-70) 126/61     Weight: 108.6 kg (239 lb 5 oz)  Body mass index is 37.48 kg/m².  Body surface area is 2.27 meters squared.      Intake/Output Summary (Last 24 hours) at 1/27/2019 1031  Last data filed at 1/27/2019 0422  Gross per 24 hour   Intake 580 ml   Output 700 ml   Net -120 ml       Physical Exam   Constitutional: She is oriented to person, place, and time. She appears well-developed and well-nourished. No distress.   Well groomed   HENT:   Head: Normocephalic and atraumatic.   Right Ear: Tympanic membrane and ear canal normal.   Left Ear: Tympanic membrane and ear canal normal.   Nose: Nose normal. Right sinus exhibits no maxillary sinus tenderness and no frontal sinus tenderness. Left sinus exhibits no maxillary sinus tenderness and no frontal sinus tenderness.   Mouth/Throat: Oropharynx is clear and moist and mucous membranes are normal. No oral lesions. Normal dentition. No oropharyngeal exudate.   Eyes: Conjunctivae, EOM and lids are normal. Pupils are equal, round, and reactive to light. Lids are everted and swept, no foreign bodies found.   Neck: Trachea normal and normal range of motion. Neck supple. No tracheal deviation present. No thyroid mass and no thyromegaly present.   No crepitus   Cardiovascular: Normal rate, regular rhythm, normal heart sounds, intact distal pulses and normal pulses. Exam reveals no gallop and no friction rub.   No murmur heard.  Pulmonary/Chest: Effort normal. No stridor. No respiratory distress. She has decreased breath sounds in the right lower field. She has no  wheezes. She has no rhonchi. She has no rales. She exhibits no tenderness.   Abdominal: Soft. Normal appearance and bowel sounds are normal. She exhibits no distension and no mass. There is no hepatosplenomegaly. There is tenderness (To deep palpation). There is no rebound and no guarding. No hernia.   Musculoskeletal: Normal range of motion. She exhibits no edema or deformity.   Neurological: She is alert and oriented to person, place, and time. She displays normal reflexes. No cranial nerve deficit. She exhibits normal muscle tone. Coordination normal.   Skin: Skin is warm, dry and intact. Capillary refill takes less than 2 seconds. No rash noted. She is not diaphoretic. No cyanosis. No pallor. Nails show no clubbing.   Psychiatric: She has a normal mood and affect. Her behavior is normal. Judgment and thought content normal.       Significant Labs:   CBC:   Recent Labs   Lab 01/25/19 2319 01/27/19 0423   WBC 13.82* 12.24   HGB 12.0 11.9*   HCT 37.5 37.3    289   , CMP:   Recent Labs   Lab 01/25/19 2319 01/27/19 0423    140   K 3.7 3.5    106   CO2 26 26   GLU 94 97   BUN 12 8   CREATININE 0.8 0.8   CALCIUM 9.3 9.2   PROT 7.0 6.8   ALBUMIN 3.5 3.2*   BILITOT 0.8 0.7   ALKPHOS 122 111   AST 40 36   ALT 40 35   ANIONGAP 9 8   EGFRNONAA >60 >60   , Coagulation: No results for input(s): PT, INR, APTT in the last 48 hours., Haptoglobin: No results for input(s): HAPTOGLOBIN in the last 48 hours., Immunology: No results for input(s): SPEP, JULISSA, LOYD, FREELAMBDALI in the last 48 hours., LDH: No results for input(s): LDHCSF, BFSOURCE in the last 48 hours. and LFTs:   Recent Labs   Lab 01/25/19 2319 01/27/19 0423   ALT 40 35   AST 40 36   ALKPHOS 122 111   BILITOT 0.8 0.7   PROT 7.0 6.8   ALBUMIN 3.5 3.2*       Diagnostic Results:  I have reviewed all pertinent imaging results/findings within the past 24 hours.

## 2019-01-27 NOTE — ASSESSMENT & PLAN NOTE
Large right-sided pleural effusion of unknown etiology.  New finding of peritoneal carcinomatosis in the abdomen.  Consulted Pulmonary for thoracentesis but unsuccessful  IR consulted, NPO after midnight  Supplemental oxygen to maintain saturations greater than 92%.  Bronchodilators as needed.

## 2019-01-27 NOTE — SUBJECTIVE & OBJECTIVE
Interval History: no issues voiced    Review of Systems Constitutional: Positive for appetite change, chills and fatigue. Negative for diaphoresis and fever.   HENT: Negative.  Negative for congestion, nosebleeds and sinus pressure.    Eyes: Negative.  Negative for visual disturbance.   Respiratory: Positive for cough (Nonproductive cough) and shortness of breath. Negative for chest tightness and wheezing.    Cardiovascular: Positive for leg swelling. Negative for chest pain and palpitations.   Gastrointestinal: Positive for abdominal pain (Generalized) and nausea. Negative for blood in stool, diarrhea and vomiting.   Endocrine: Negative.  Negative for polyuria.   Genitourinary: Negative.  Negative for dysuria, flank pain, frequency and urgency.   Musculoskeletal: Negative.  Negative for back pain, joint swelling and neck stiffness.   Skin: Negative.  Negative for color change, pallor and rash.   Allergic/Immunologic: Negative.  Negative for immunocompromised state.   Neurological: Negative.  Negative for dizziness, syncope, speech difficulty, numbness and headaches.   Hematological: Negative.  Does not bruise/bleed easily.   Psychiatric/Behavioral: Negative.  Negative for confusion, decreased concentration and hallucinations. The patient is not nervous/anxious.    All other systems reviewed and are negative.      Objective:     Vital Signs (Most Recent):  Temp: 98.4 °F (36.9 °C) (01/27/19 1516)  Pulse: 98 (01/27/19 1705)  Resp: 16 (01/27/19 1516)  BP: (!) 150/72 (01/27/19 1516)  SpO2: (!) 94 % (01/27/19 1516) Vital Signs (24h Range):  Temp:  [96 °F (35.6 °C)-98.8 °F (37.1 °C)] 98.4 °F (36.9 °C)  Pulse:  [79-99] 98  Resp:  [16-17] 16  SpO2:  [91 %-94 %] 94 %  BP: (126-150)/(61-78) 150/72     Weight: 108.6 kg (239 lb 5 oz)  Body mass index is 37.48 kg/m².    Intake/Output Summary (Last 24 hours) at 1/27/2019 4022  Last data filed at 1/27/2019 1300  Gross per 24 hour   Intake 700 ml   Output 700 ml   Net 0 ml       Physical Exam Constitutional: She is oriented to person, place, and time. No distress.   Morbidly obese  female, in no respiratory distress, comfortably lying in bed.  No family at the bedside.   HENT:   Head: Normocephalic and atraumatic.   Eyes: Conjunctivae and EOM are normal. No scleral icterus.   Neck: Normal range of motion. Neck supple.   Cardiovascular: Normal rate, regular rhythm, normal heart sounds and intact distal pulses.   No murmur heard.  Pulmonary/Chest: Effort normal. No accessory muscle usage. No respiratory distress. She has decreased breath sounds in the right middle field and the right lower field. She has no wheezes. She has no rales. She exhibits no tenderness.   Abdominal: Soft. Bowel sounds are normal. She exhibits no distension. There is tenderness (Vague generalized abdominal discomfort on palpation).   Musculoskeletal: Normal range of motion. She exhibits edema. She exhibits no tenderness.   Neurological: She is alert and oriented to person, place, and time. No cranial nerve deficit. She exhibits normal muscle tone. Coordination normal.   Skin: Skin is warm and dry. She is not diaphoretic. No erythema.   Psychiatric: She has a normal mood and affect. Her behavior is normal. Thought content normal.   Nursing note and vitals reviewed.        Significant Labs:   CBC:   Recent Labs   Lab 01/25/19 2319 01/27/19  0423   WBC 13.82* 12.24   HGB 12.0 11.9*   HCT 37.5 37.3    289     CMP:   Recent Labs   Lab 01/25/19 2319 01/27/19  0423    140   K 3.7 3.5    106   CO2 26 26   GLU 94 97   BUN 12 8   CREATININE 0.8 0.8   CALCIUM 9.3 9.2   PROT 7.0 6.8   ALBUMIN 3.5 3.2*   BILITOT 0.8 0.7   ALKPHOS 122 111   AST 40 36   ALT 40 35   ANIONGAP 9 8   EGFRNONAA >60 >60     All pertinent labs within the past 24 hours have been reviewed.    Significant Imaging: I have reviewed all pertinent imaging results/findings within the past 24 hours.   Imaging Results           CT Chest Abdoment Pelvis With Contrast (Final result)  Result time 01/26/19 09:01:55    Final result by Sheng Mosqueda MD (01/26/19 09:01:55)                 Impression:      Moderate volume right pleural effusion. Passive atelectasis right lung base.  Suggestion of a 2 cm nodular type finding in the right middle lobe surrounded by some atelectasis. Recommend follow-up chest CT in 1 month to demonstrate stability or resolution.    Moderate right-sided hydroureteronephrosis likely secondary to multiple right retroperitoneal enlarged lymph nodes, as well as a right upper pelvic mass measuring 4.7 cm.  Suggestion of prominence of the right ovary in the pelvis measuring 5.5 x 3.8 cm. The left ovary is unremarkable. Recommend further evaluation with pelvic ultrasound on a nonemergent basis.    A few small anterior omental nodules are noted a suspicious for peritoneal carcinomatosis, for example left kimani abdominal nodule 2.6 x 1.5 cm (series 2, image 134) and left anterior kimani abdominal 4.9 x 4.1 cm mass (image 108).      Electronically signed by: Sheng Mosqueda  Date:    01/26/2019  Time:    09:01             Narrative:    EXAMINATION:  CT CHEST ABDOMEN PELVIS WITH CONTRAST (XPD)    CLINICAL HISTORY:  RLQ pain and SOB;    TECHNIQUE:  Low dose axial images, sagittal and coronal reformations were obtained from the thoracic inlet to the pubic symphysis following the IV administration of 75 mL of Omnipaque 350 .   oral contrast was not administered. All CT scans at this location are performed using dose modulation techniques as appropriate to a performed exam including the following: Automated exposure control; adjustment of the mA and/or kV  according to patient size.    COMPARISON:  None    FINDINGS:  Thyroid unremarkable.    Moderate volume right pleural effusion.  Passive atelectasis right lung base.  Suggestion of a 2 cm nodular type finding in the right middle lobe surrounded by some atelectasis.  Recommend follow-up  chest CT in 1 month to demonstrate stability or resolution.    Liver, spleen unremarkable.    Moderate right-sided hydroureteronephrosis likely secondary to multiple right retroperitoneal enlarged lymph nodes, as well as a right upper pelvic mass measuring 4.7 cm, possibly ovarian in origin.  Suggestion of prominence of the right ovary in the pelvis measuring 5.5 x 3.8 cm.  The left ovary is unremarkable.  Recommend further evaluation with pelvic ultrasound on a nonemergent basis.    A few small anterior omental nodules are noted a suspicious for peritoneal carcinomatosis, for example left kimani abdominal nodule 2.6 x 1.5 cm (series 2, image 134) and left anterior kimani abdominal 4.9 x 4.1 cm mass (image 108).    Left kidney unremarkable.    No evidence of bowel obstruction.  No abscess.    Pancolonic diverticulosis without evidence of diverticulitis.    Trace free fluid in the pelvis.    Uterus likely surgically absent.                               X-Ray Chest PA And Lateral (Final result)  Result time 01/25/19 23:27:16    Final result by Inocencio Francis Jr., MD (01/25/19 23:27:16)                 Impression:      Pleural-parenchymal disease on the right may relate to pneumonia.  Follow-up to resolution is recommended.      Electronically signed by: Inocencio Francis Jr., MD  Date:    01/25/2019  Time:    23:27             Narrative:    EXAMINATION:  XR CHEST PA AND LATERAL    CLINICAL HISTORY:  Shortness of breath    TECHNIQUE:  PA and lateral views of the chest were performed.    COMPARISON:  PA and lateral from January 31, 2018.    FINDINGS:  There is new pleural parenchymal disease involving the right lower chest.  The more superior right lung and the left lung are clear.  No pneumothorax.  The trachea is midline.    The cardiac silhouette is normal in size. The hilar and mediastinal contours are unremarkable.    Bones are intact.

## 2019-01-27 NOTE — PROGRESS NOTES
[facility] Hospital Medicine  Progress Note    Patient Name: Casie Gaines  MRN: [id number]  Patient Class: OP- Observation   Admission Date: 1/25/2019  Length of Stay: 0 days  Attending Physician: Padmaja Larson MD  Primary Care Provider: Rossana Ang MD        Subjective:     Principal Problem:Pleural effusion, right    HPI:  Ms. Gaines is a 63-year-old  female with PMH significant for HTN, hyperlipidemia, presents to [facility] ED complaining of progressively worsening shortness of breath for the past 3-4 weeks, associated with left abdominal discomfort.  She reports chills but no fever.  Complains of shortness of breath without cough, congestion.  Denies hemoptysis or recent weight loss.  In the ED chest x-ray revealed large right sided pleural effusion.  CT abdomen revealed numerous soft tissue masses within the left kimani abdomen, suggestive of peritoneal carcinomatosis.  Patient has no known history of malignancy.  Unknown primary.  In addition patient has urinary tract infection with many bacteria, greater than 100 WBC, positive nitrites and leukocyte esterases.  She was started on IV Rocephin in the ED.  Admitting diagnosis large pleural effusion of unknown etiology, and new diagnosis of peritoneal carcinomatosis.  Oncology and Pulmonary consultations placed.    Hospital Course:  Pt admitted for Large right-sided pleural effusion of unknown etiology and New finding of peritoneal carcinomatosis in the abdomen on CT abd. Pulm consulted for thoracentesis. 3x but unsuccessful. Rocephin started for UTI. Heme/Onc consulted. Ordered tumor markers and rec'd CT-guided biopsy of 1 of the abdominal masses for tissue diagnosis and molecular studies. IR consulted for thoracentesis and CT guided biopsy. NPO after midnight    Interval History: no issues voiced    Review of Systems Constitutional: Positive for appetite change, chills and fatigue. Negative for  diaphoresis and fever.   HENT: Negative.  Negative for congestion, nosebleeds and sinus pressure.    Eyes: Negative.  Negative for visual disturbance.   Respiratory: Positive for cough (Nonproductive cough) and shortness of breath. Negative for chest tightness and wheezing.    Cardiovascular: Positive for leg swelling. Negative for chest pain and palpitations.   Gastrointestinal: Positive for abdominal pain (Generalized) and nausea. Negative for blood in stool, diarrhea and vomiting.   Endocrine: Negative.  Negative for polyuria.   Genitourinary: Negative.  Negative for dysuria, flank pain, frequency and urgency.   Musculoskeletal: Negative.  Negative for back pain, joint swelling and neck stiffness.   Skin: Negative.  Negative for color change, pallor and rash.   Allergic/Immunologic: Negative.  Negative for immunocompromised state.   Neurological: Negative.  Negative for dizziness, syncope, speech difficulty, numbness and headaches.   Hematological: Negative.  Does not bruise/bleed easily.   Psychiatric/Behavioral: Negative.  Negative for confusion, decreased concentration and hallucinations. The patient is not nervous/anxious.    All other systems reviewed and are negative.      Objective:     Vital Signs (Most Recent):  Temp: 98.4 °F (36.9 °C) (01/27/19 1516)  Pulse: 98 (01/27/19 1705)  Resp: 16 (01/27/19 1516)  BP: (!) 150/72 (01/27/19 1516)  SpO2: (!) 94 % (01/27/19 1516) Vital Signs (24h Range):  Temp:  [96 °F (35.6 °C)-98.8 °F (37.1 °C)] 98.4 °F (36.9 °C)  Pulse:  [79-99] 98  Resp:  [16-17] 16  SpO2:  [91 %-94 %] 94 %  BP: (126-150)/(61-78) 150/72     Weight: 108.6 kg (239 lb 5 oz)  Body mass index is 37.48 kg/m².    Intake/Output Summary (Last 24 hours) at 1/27/2019 2495  Last data filed at 1/27/2019 1300  Gross per 24 hour   Intake 700 ml   Output 700 ml   Net 0 ml      Physical Exam Constitutional: She is oriented to person, place, and time. No distress.   Morbidly obese  female, in no  respiratory distress, comfortably lying in bed.  No family at the bedside.   HENT:   Head: Normocephalic and atraumatic.   Eyes: Conjunctivae and EOM are normal. No scleral icterus.   Neck: Normal range of motion. Neck supple.   Cardiovascular: Normal rate, regular rhythm, normal heart sounds and intact distal pulses.   No murmur heard.  Pulmonary/Chest: Effort normal. No accessory muscle usage. No respiratory distress. She has decreased breath sounds in the right middle field and the right lower field. She has no wheezes. She has no rales. She exhibits no tenderness.   Abdominal: Soft. Bowel sounds are normal. She exhibits no distension. There is tenderness (Vague generalized abdominal discomfort on palpation).   Musculoskeletal: Normal range of motion. She exhibits edema. She exhibits no tenderness.   Neurological: She is alert and oriented to person, place, and time. No cranial nerve deficit. She exhibits normal muscle tone. Coordination normal.   Skin: Skin is warm and dry. She is not diaphoretic. No erythema.   Psychiatric: She has a normal mood and affect. Her behavior is normal. Thought content normal.   Nursing note and vitals reviewed.        Significant Labs:   CBC:   Recent Labs   Lab 01/25/19 2319 01/27/19  0423   WBC 13.82* 12.24   HGB 12.0 11.9*   HCT 37.5 37.3    289     CMP:   Recent Labs   Lab 01/25/19 2319 01/27/19  0423    140   K 3.7 3.5    106   CO2 26 26   GLU 94 97   BUN 12 8   CREATININE 0.8 0.8   CALCIUM 9.3 9.2   PROT 7.0 6.8   ALBUMIN 3.5 3.2*   BILITOT 0.8 0.7   ALKPHOS 122 111   AST 40 36   ALT 40 35   ANIONGAP 9 8   EGFRNONAA >60 >60     All pertinent labs within the past 24 hours have been reviewed.    Significant Imaging: I have reviewed all pertinent imaging results/findings within the past 24 hours.   Imaging Results          CT Chest Abdoment Pelvis With Contrast (Final result)  Result time 01/26/19 09:01:55    Final result by Sheng Mosqueda MD (01/26/19  09:01:55)                 Impression:      Moderate volume right pleural effusion. Passive atelectasis right lung base.  Suggestion of a 2 cm nodular type finding in the right middle lobe surrounded by some atelectasis. Recommend follow-up chest CT in 1 month to demonstrate stability or resolution.    Moderate right-sided hydroureteronephrosis likely secondary to multiple right retroperitoneal enlarged lymph nodes, as well as a right upper pelvic mass measuring 4.7 cm.  Suggestion of prominence of the right ovary in the pelvis measuring 5.5 x 3.8 cm. The left ovary is unremarkable. Recommend further evaluation with pelvic ultrasound on a nonemergent basis.    A few small anterior omental nodules are noted a suspicious for peritoneal carcinomatosis, for example left kimani abdominal nodule 2.6 x 1.5 cm (series 2, image 134) and left anterior kimani abdominal 4.9 x 4.1 cm mass (image 108).      Electronically signed by: Sheng Mosqueda  Date:    01/26/2019  Time:    09:01             Narrative:    EXAMINATION:  CT CHEST ABDOMEN PELVIS WITH CONTRAST (XPD)    CLINICAL HISTORY:  RLQ pain and SOB;    TECHNIQUE:  Low dose axial images, sagittal and coronal reformations were obtained from the thoracic inlet to the pubic symphysis following the IV administration of 75 mL of Omnipaque 350 .   oral contrast was not administered. All CT scans at this location are performed using dose modulation techniques as appropriate to a performed exam including the following: Automated exposure control; adjustment of the mA and/or kV  according to patient size.    COMPARISON:  None    FINDINGS:  Thyroid unremarkable.    Moderate volume right pleural effusion.  Passive atelectasis right lung base.  Suggestion of a 2 cm nodular type finding in the right middle lobe surrounded by some atelectasis.  Recommend follow-up chest CT in 1 month to demonstrate stability or resolution.    Liver, spleen unremarkable.    Moderate right-sided  hydroureteronephrosis likely secondary to multiple right retroperitoneal enlarged lymph nodes, as well as a right upper pelvic mass measuring 4.7 cm, possibly ovarian in origin.  Suggestion of prominence of the right ovary in the pelvis measuring 5.5 x 3.8 cm.  The left ovary is unremarkable.  Recommend further evaluation with pelvic ultrasound on a nonemergent basis.    A few small anterior omental nodules are noted a suspicious for peritoneal carcinomatosis, for example left kimani abdominal nodule 2.6 x 1.5 cm (series 2, image 134) and left anterior kimani abdominal 4.9 x 4.1 cm mass (image 108).    Left kidney unremarkable.    No evidence of bowel obstruction.  No abscess.    Pancolonic diverticulosis without evidence of diverticulitis.    Trace free fluid in the pelvis.    Uterus likely surgically absent.                               X-Ray Chest PA And Lateral (Final result)  Result time 01/25/19 23:27:16    Final result by Inocencio Francis Jr., MD (01/25/19 23:27:16)                 Impression:      Pleural-parenchymal disease on the right may relate to pneumonia.  Follow-up to resolution is recommended.      Electronically signed by: Inocencio Francis Jr., MD  Date:    01/25/2019  Time:    23:27             Narrative:    EXAMINATION:  XR CHEST PA AND LATERAL    CLINICAL HISTORY:  Shortness of breath    TECHNIQUE:  PA and lateral views of the chest were performed.    COMPARISON:  PA and lateral from January 31, 2018.    FINDINGS:  There is new pleural parenchymal disease involving the right lower chest.  The more superior right lung and the left lung are clear.  No pneumothorax.  The trachea is midline.    The cardiac silhouette is normal in size. The hilar and mediastinal contours are unremarkable.    Bones are intact.                                  Assessment/Plan:      * Pleural effusion, right    Large right-sided pleural effusion of unknown etiology.  New finding of peritoneal carcinomatosis in the  abdomen.  Consulted Pulmonary for thoracentesis but unsuccessful  IR consulted, NPO after midnight  Supplemental oxygen to maintain saturations greater than 92%.  Bronchodilators as needed.       UTI (urinary tract infection)    Many bacteria, greater than 100 WBC, positive for leukocyte esterases and nitrites.  Rocephin 2 g IV daily.  Follow up on culture pending Ecoli       Morbid obesity    BMI 37.4.       Primary peritoneal carcinomatosis    New finding on CT abdomen:  Peritoneal carcinoma  No history of known malignancy.    hematology oncology:  -tumor markers pending  --planningCT-guided biopsy of 1 of the abdominal masses  --supportive care  --patient may be discharged with outpatient follow-up following biopsy     Hyperlipidemia    Continue home dose statin.       History of total hysterectomy with bilateral salpingo-oophorectomy (BSO)            Hypertension    Continue home dose olmesartan, hydrochlorothiazide.  Monitor blood pressure closely and make adjustments as needed.         VTE Risk Mitigation (From admission, onward)        Ordered     Place sequential compression device  Until discontinued      01/26/19 0546              Padmaja Larson MD  Department of Hospital Medicine   [facility]

## 2019-01-27 NOTE — PLAN OF CARE
Problem: Adult Inpatient Plan of Care  Goal: Patient-Specific Goal (Individualization)  Outcome: Ongoing (interventions implemented as appropriate)  Pt in bed AAOx4. Plan of Care reviewed, Verbalized understanding.   Patient remained free from falls, fall precautions in place.  Patient is NSR on monitor.VSS.  Call bell and personal belongings within reach. Hourly rounding complete. Reminded to call for assistance. No complaints at this time. Will continue to monitor.

## 2019-01-28 PROBLEM — N13.39 OTHER HYDRONEPHROSIS: Status: ACTIVE | Noted: 2019-01-28

## 2019-01-28 PROBLEM — R91.8 LUNG MASS: Status: ACTIVE | Noted: 2019-01-28

## 2019-01-28 PROBLEM — C78.6 CARCINOMATOSIS PERITONEI: Status: ACTIVE | Noted: 2019-01-26

## 2019-01-28 PROBLEM — R19.00 PELVIC MASS: Status: ACTIVE | Noted: 2019-01-28

## 2019-01-28 LAB
APPEARANCE FLD: NORMAL
APTT BLDCRRT: 27.6 SEC
BACTERIA UR CULT: NORMAL
BODY FLD TYPE: NORMAL
COLOR FLD: NORMAL
EOSINOPHIL NFR FLD MANUAL: 1 %
INR PPP: 1
LDH SERPL L TO P-CCNC: 737 U/L
LYMPHOCYTES NFR FLD MANUAL: 72 %
MONOS+MACROS NFR FLD MANUAL: 14 %
NEUTROPHILS NFR FLD MANUAL: 13 %
PROT SERPL-MCNC: 6.9 G/DL
PROTHROMBIN TIME: 10.8 SEC
WBC # FLD: 4101 /CU MM

## 2019-01-28 PROCEDURE — 87086 URINE CULTURE/COLONY COUNT: CPT

## 2019-01-28 PROCEDURE — 83615 LACTATE (LD) (LDH) ENZYME: CPT | Mod: 91

## 2019-01-28 PROCEDURE — 88342 IMHCHEM/IMCYTCHM 1ST ANTB: CPT | Performed by: PATHOLOGY

## 2019-01-28 PROCEDURE — 99233 PR SUBSEQUENT HOSPITAL CARE,LEVL III: ICD-10-PCS | Mod: ,,, | Performed by: INTERNAL MEDICINE

## 2019-01-28 PROCEDURE — 88341 IMHCHEM/IMCYTCHM EA ADD ANTB: CPT | Performed by: PATHOLOGY

## 2019-01-28 PROCEDURE — 87205 SMEAR GRAM STAIN: CPT

## 2019-01-28 PROCEDURE — 88305 TISSUE SPECIMEN TO PATHOLOGY, RADIOLOGY: ICD-10-PCS | Mod: 26,,, | Performed by: PATHOLOGY

## 2019-01-28 PROCEDURE — 88305 CYTOLOGY SPECIMEN- MEDICAL CYTOLOGY (FLUID/WASH/BRUSH): ICD-10-PCS | Mod: 26,,, | Performed by: PATHOLOGY

## 2019-01-28 PROCEDURE — 82945 GLUCOSE OTHER FLUID: CPT

## 2019-01-28 PROCEDURE — 88112 CYTOLOGY SPECIMEN- MEDICAL CYTOLOGY (FLUID/WASH/BRUSH): ICD-10-PCS | Mod: 26,,, | Performed by: PATHOLOGY

## 2019-01-28 PROCEDURE — 88342 CYTOLOGY SPECIMEN- MEDICAL CYTOLOGY (FLUID/WASH/BRUSH): ICD-10-PCS | Mod: 26,,, | Performed by: PATHOLOGY

## 2019-01-28 PROCEDURE — 89051 BODY FLUID CELL COUNT: CPT

## 2019-01-28 PROCEDURE — 88305 TISSUE EXAM BY PATHOLOGIST: CPT | Mod: 26,,, | Performed by: PATHOLOGY

## 2019-01-28 PROCEDURE — 84155 ASSAY OF PROTEIN SERUM: CPT

## 2019-01-28 PROCEDURE — 21400001 HC TELEMETRY ROOM

## 2019-01-28 PROCEDURE — 88341 IMHCHEM/IMCYTCHM EA ADD ANTB: CPT | Mod: 26,,, | Performed by: PATHOLOGY

## 2019-01-28 PROCEDURE — 88112 CYTOPATH CELL ENHANCE TECH: CPT | Mod: 26,,, | Performed by: PATHOLOGY

## 2019-01-28 PROCEDURE — 36415 COLL VENOUS BLD VENIPUNCTURE: CPT

## 2019-01-28 PROCEDURE — 82150 ASSAY OF AMYLASE: CPT

## 2019-01-28 PROCEDURE — 25000003 PHARM REV CODE 250: Performed by: NURSE PRACTITIONER

## 2019-01-28 PROCEDURE — 88342 IMHCHEM/IMCYTCHM 1ST ANTB: CPT | Mod: 26,,, | Performed by: PATHOLOGY

## 2019-01-28 PROCEDURE — 84311 SPECTROPHOTOMETRY: CPT

## 2019-01-28 PROCEDURE — 63600175 PHARM REV CODE 636 W HCPCS: Performed by: RADIOLOGY

## 2019-01-28 PROCEDURE — 88305 TISSUE EXAM BY PATHOLOGIST: CPT | Performed by: PATHOLOGY

## 2019-01-28 PROCEDURE — 88341 TISSUE SPECIMEN TO PATHOLOGY, RADIOLOGY: ICD-10-PCS | Mod: 26,,, | Performed by: PATHOLOGY

## 2019-01-28 PROCEDURE — 63600175 PHARM REV CODE 636 W HCPCS: Performed by: INTERNAL MEDICINE

## 2019-01-28 PROCEDURE — 83615 LACTATE (LD) (LDH) ENZYME: CPT

## 2019-01-28 PROCEDURE — 87070 CULTURE OTHR SPECIMN AEROBIC: CPT

## 2019-01-28 PROCEDURE — 25000003 PHARM REV CODE 250: Performed by: INTERNAL MEDICINE

## 2019-01-28 PROCEDURE — 84157 ASSAY OF PROTEIN OTHER: CPT

## 2019-01-28 PROCEDURE — 99233 SBSQ HOSP IP/OBS HIGH 50: CPT | Mod: ,,, | Performed by: INTERNAL MEDICINE

## 2019-01-28 PROCEDURE — 82042 OTHER SOURCE ALBUMIN QUAN EA: CPT

## 2019-01-28 PROCEDURE — 88341 CYTOLOGY SPECIMEN- MEDICAL CYTOLOGY (FLUID/WASH/BRUSH): ICD-10-PCS | Mod: 26,,, | Performed by: PATHOLOGY

## 2019-01-28 PROCEDURE — 85610 PROTHROMBIN TIME: CPT

## 2019-01-28 PROCEDURE — 94761 N-INVAS EAR/PLS OXIMETRY MLT: CPT

## 2019-01-28 PROCEDURE — 85730 THROMBOPLASTIN TIME PARTIAL: CPT

## 2019-01-28 PROCEDURE — 88342 TISSUE SPECIMEN TO PATHOLOGY, RADIOLOGY: ICD-10-PCS | Mod: 26,,, | Performed by: PATHOLOGY

## 2019-01-28 PROCEDURE — 25500020 PHARM REV CODE 255: Performed by: RADIOLOGY

## 2019-01-28 RX ORDER — FENTANYL CITRATE 50 UG/ML
INJECTION, SOLUTION INTRAMUSCULAR; INTRAVENOUS CODE/TRAUMA/SEDATION MEDICATION
Status: COMPLETED | OUTPATIENT
Start: 2019-01-28 | End: 2019-01-28

## 2019-01-28 RX ORDER — SODIUM CHLORIDE 9 MG/ML
INJECTION, SOLUTION INTRAVENOUS CONTINUOUS
Status: DISCONTINUED | OUTPATIENT
Start: 2019-01-28 | End: 2019-01-30 | Stop reason: HOSPADM

## 2019-01-28 RX ORDER — MIDAZOLAM HYDROCHLORIDE 1 MG/ML
INJECTION INTRAMUSCULAR; INTRAVENOUS CODE/TRAUMA/SEDATION MEDICATION
Status: COMPLETED | OUTPATIENT
Start: 2019-01-28 | End: 2019-01-28

## 2019-01-28 RX ADMIN — CANDESARTAN CILEXETIL 16 MG: 16 TABLET ORAL at 08:01

## 2019-01-28 RX ADMIN — MIDAZOLAM HYDROCHLORIDE 1 MG: 1 INJECTION, SOLUTION INTRAMUSCULAR; INTRAVENOUS at 01:01

## 2019-01-28 RX ADMIN — SODIUM CHLORIDE: 0.9 INJECTION, SOLUTION INTRAVENOUS at 11:01

## 2019-01-28 RX ADMIN — PANTOPRAZOLE SODIUM 40 MG: 40 TABLET, DELAYED RELEASE ORAL at 08:01

## 2019-01-28 RX ADMIN — MIDAZOLAM HYDROCHLORIDE 0.5 MG: 1 INJECTION, SOLUTION INTRAMUSCULAR; INTRAVENOUS at 01:01

## 2019-01-28 RX ADMIN — IOHEXOL 15 ML: 350 INJECTION, SOLUTION INTRAVENOUS at 09:01

## 2019-01-28 RX ADMIN — HYDROCHLOROTHIAZIDE 12.5 MG: 12.5 TABLET ORAL at 08:01

## 2019-01-28 RX ADMIN — FENTANYL CITRATE 25 MCG: 50 INJECTION, SOLUTION INTRAMUSCULAR; INTRAVENOUS at 01:01

## 2019-01-28 RX ADMIN — CEFTRIAXONE 2 G: 2 INJECTION, SOLUTION INTRAVENOUS at 06:01

## 2019-01-28 RX ADMIN — FENTANYL CITRATE 50 MCG: 50 INJECTION, SOLUTION INTRAMUSCULAR; INTRAVENOUS at 01:01

## 2019-01-28 RX ADMIN — ROSUVASTATIN CALCIUM 10 MG: 10 TABLET, FILM COATED ORAL at 08:01

## 2019-01-28 NOTE — SEDATION DOCUMENTATION
Patient placed on CT table supine with both arms above head.  VSS, CM in place.  Patient verbalizes understanding of instructions.

## 2019-01-28 NOTE — ASSESSMENT & PLAN NOTE
CT shows right sided hydronephrosis secondary to multiple right retroperitoneal enlarged lymph nodes, as well as a right upper pelvic mass measuring 4.7 cm.   Urology consulted who recommended IVP  Will obtain IVP tomorrow AM- pt receiving contrast for bx today   Monitor renal function closely

## 2019-01-28 NOTE — CONSULTS
After reviewing the chart the patient appears to be a candidate for a abdominal mass biopsy and a thoracentesis.  I will place those orders.  Thank you for the consult.

## 2019-01-28 NOTE — ASSESSMENT & PLAN NOTE
New finding on CT abdomen:  Peritoneal carcinoma and right upper pelvic mass   No history of known malignancy.  likely metastatic right ovarian cancer  Heme/Onc following    2740  CEA 65.4   CT guided bx today

## 2019-01-28 NOTE — PROGRESS NOTES
[facility] Hospital Medicine  Progress Note    Patient Name: Casie Gaines  MRN: [id number]  Patient Class: IP- Inpatient   Admission Date: 1/25/2019  Length of Stay: 0 days  Attending Physician: Timmy Carr MD  Primary Care Provider: Rossana Ang MD        Subjective:     Principal Problem:Peritoneal carcinomatosis    HPI:  Ms. Gaines is a 63-year-old  female with PMH significant for HTN, hyperlipidemia, presents to [facility] ED complaining of progressively worsening shortness of breath for the past 3-4 weeks, associated with left abdominal discomfort.  She reports chills but no fever.  Complains of shortness of breath without cough, congestion.  Denies hemoptysis or recent weight loss.  In the ED chest x-ray revealed large right sided pleural effusion.  CT abdomen revealed numerous soft tissue masses within the left kimani abdomen, suggestive of peritoneal carcinomatosis.  Patient has no known history of malignancy.  Unknown primary.  In addition patient has urinary tract infection with many bacteria, greater than 100 WBC, positive nitrites and leukocyte esterases.  She was started on IV Rocephin in the ED.  Admitting diagnosis large pleural effusion of unknown etiology, and new diagnosis of peritoneal carcinomatosis.  Oncology and Pulmonary consultations placed.    Hospital Course:  Pt admitted for Large right-sided pleural effusion of unknown etiology and new finding of peritoneal carcinomatosis in the abdomen on CT abd. CT abd also showed moderate right sided hydronephrosis. Pulm consulted for thoracentesis but unsuccessful after 3 attempts. IR consulted for CT-guided biopsy of abdominal masses for tissue diagnosis and molecular studies. IR also consulted for thoracentesis and CT guided biopsy.     Rocephin started for UTI. Urine culture shows gram negative kimberli.     Heme/Onc consulted. Tumor markers showed  is 2740, CEA 65.4.     Discussed right sided  hydronephrosis with Urology who recommended IVP- IVP can not bee done until tomorrow due to contrast injection today           Interval History: no issues voiced    Review of Systems   Constitutional: Positive for appetite change, chills and fatigue. Negative for diaphoresis and fever.   HENT: Negative.  Negative for congestion, nosebleeds and sinus pressure.    Eyes: Negative.  Negative for visual disturbance.   Respiratory: Positive for cough (Nonproductive cough) and shortness of breath. Negative for chest tightness and wheezing.    Cardiovascular: Positive for leg swelling. Negative for chest pain and palpitations.   Gastrointestinal: Positive for abdominal pain (Generalized) and nausea. Negative for blood in stool, diarrhea and vomiting.   Endocrine: Negative.  Negative for polyuria.   Genitourinary: Negative.  Negative for dysuria, flank pain, frequency and urgency.   Musculoskeletal: Negative.  Negative for back pain, joint swelling and neck stiffness.   Skin: Negative.  Negative for color change, pallor and rash.   Allergic/Immunologic: Negative.  Negative for immunocompromised state.   Neurological: Negative.  Negative for dizziness, syncope, speech difficulty, numbness and headaches.   Hematological: Negative.  Does not bruise/bleed easily.   Psychiatric/Behavioral: Negative.  Negative for confusion, decreased concentration and hallucinations. The patient is not nervous/anxious.    All other systems reviewed and are negative.      Objective:     Vital Signs (Most Recent):  Temp: 98.2 °F (36.8 °C) (01/28/19 0751)  Pulse: 86 (01/28/19 0915)  Resp: 18 (01/28/19 0751)  BP: (!) 140/69 (01/28/19 0751)  SpO2: (!) 94 % (01/28/19 0751) Vital Signs (24h Range):  Temp:  [97.9 °F (36.6 °C)-99.7 °F (37.6 °C)] 98.2 °F (36.8 °C)  Pulse:  [80-98] 86  Resp:  [1-18] 18  SpO2:  [93 %-97 %] 94 %  BP: (130-150)/(65-78) 140/69     Weight: 108.6 kg (239 lb 6.7 oz)  Body mass index is 37.5 kg/m².    Intake/Output Summary (Last 24  hours) at 1/28/2019 1043  Last data filed at 1/28/2019 0444  Gross per 24 hour   Intake 750 ml   Output 300 ml   Net 450 ml      Physical Exam   Constitutional: She is oriented to person, place, and time. No distress.   Morbidly obese  female, in no respiratory distress, comfortably lying in bed.  No family at the bedside.   HENT:   Head: Normocephalic and atraumatic.   Eyes: Conjunctivae and EOM are normal. No scleral icterus.   Neck: Normal range of motion. Neck supple.   Cardiovascular: Normal rate, regular rhythm, normal heart sounds and intact distal pulses.   No murmur heard.  Pulmonary/Chest: Effort normal. No accessory muscle usage. No respiratory distress. She has decreased breath sounds in the right middle field and the right lower field. She has no wheezes. She has no rales. She exhibits no tenderness.   Abdominal: Soft. Bowel sounds are normal. She exhibits no distension. There is tenderness (Vague generalized abdominal discomfort on palpation).   Musculoskeletal: Normal range of motion. She exhibits edema. She exhibits no tenderness.   Neurological: She is alert and oriented to person, place, and time. No cranial nerve deficit. She exhibits normal muscle tone. Coordination normal.   Skin: Skin is warm and dry. She is not diaphoretic. No erythema.   Psychiatric: She has a normal mood and affect. Her behavior is normal. Thought content normal.   Nursing note and vitals reviewed.        Significant Labs:   CBC:   Recent Labs   Lab 01/27/19  0423   WBC 12.24   HGB 11.9*   HCT 37.3        CMP:   Recent Labs   Lab 01/27/19  0423      K 3.5      CO2 26   GLU 97   BUN 8   CREATININE 0.8   CALCIUM 9.2   PROT 6.8   ALBUMIN 3.2*   BILITOT 0.7   ALKPHOS 111   AST 36   ALT 35   ANIONGAP 8   EGFRNONAA >60     All pertinent labs within the past 24 hours have been reviewed.    Significant Imaging: I have reviewed all pertinent imaging results/findings within the past 24 hours.    Imaging Results          CT Chest Abdoment Pelvis With Contrast (Final result)  Result time 01/26/19 09:01:55    Final result by Sheng Mosqueda MD (01/26/19 09:01:55)                 Impression:      Moderate volume right pleural effusion. Passive atelectasis right lung base.  Suggestion of a 2 cm nodular type finding in the right middle lobe surrounded by some atelectasis. Recommend follow-up chest CT in 1 month to demonstrate stability or resolution.    Moderate right-sided hydroureteronephrosis likely secondary to multiple right retroperitoneal enlarged lymph nodes, as well as a right upper pelvic mass measuring 4.7 cm.  Suggestion of prominence of the right ovary in the pelvis measuring 5.5 x 3.8 cm. The left ovary is unremarkable. Recommend further evaluation with pelvic ultrasound on a nonemergent basis.    A few small anterior omental nodules are noted a suspicious for peritoneal carcinomatosis, for example left kimani abdominal nodule 2.6 x 1.5 cm (series 2, image 134) and left anterior kimani abdominal 4.9 x 4.1 cm mass (image 108).      Electronically signed by: Sheng Mosqueda  Date:    01/26/2019  Time:    09:01             Narrative:    EXAMINATION:  CT CHEST ABDOMEN PELVIS WITH CONTRAST (XPD)    CLINICAL HISTORY:  RLQ pain and SOB;    TECHNIQUE:  Low dose axial images, sagittal and coronal reformations were obtained from the thoracic inlet to the pubic symphysis following the IV administration of 75 mL of Omnipaque 350 .   oral contrast was not administered. All CT scans at this location are performed using dose modulation techniques as appropriate to a performed exam including the following: Automated exposure control; adjustment of the mA and/or kV  according to patient size.    COMPARISON:  None    FINDINGS:  Thyroid unremarkable.    Moderate volume right pleural effusion.  Passive atelectasis right lung base.  Suggestion of a 2 cm nodular type finding in the right middle lobe surrounded by some  atelectasis.  Recommend follow-up chest CT in 1 month to demonstrate stability or resolution.    Liver, spleen unremarkable.    Moderate right-sided hydroureteronephrosis likely secondary to multiple right retroperitoneal enlarged lymph nodes, as well as a right upper pelvic mass measuring 4.7 cm, possibly ovarian in origin.  Suggestion of prominence of the right ovary in the pelvis measuring 5.5 x 3.8 cm.  The left ovary is unremarkable.  Recommend further evaluation with pelvic ultrasound on a nonemergent basis.    A few small anterior omental nodules are noted a suspicious for peritoneal carcinomatosis, for example left kimani abdominal nodule 2.6 x 1.5 cm (series 2, image 134) and left anterior kimani abdominal 4.9 x 4.1 cm mass (image 108).    Left kidney unremarkable.    No evidence of bowel obstruction.  No abscess.    Pancolonic diverticulosis without evidence of diverticulitis.    Trace free fluid in the pelvis.    Uterus likely surgically absent.                               X-Ray Chest PA And Lateral (Final result)  Result time 01/25/19 23:27:16    Final result by Inocencio Francis Jr., MD (01/25/19 23:27:16)                 Impression:      Pleural-parenchymal disease on the right may relate to pneumonia.  Follow-up to resolution is recommended.      Electronically signed by: Inocencio Francis Jr., MD  Date:    01/25/2019  Time:    23:27             Narrative:    EXAMINATION:  XR CHEST PA AND LATERAL    CLINICAL HISTORY:  Shortness of breath    TECHNIQUE:  PA and lateral views of the chest were performed.    COMPARISON:  PA and lateral from January 31, 2018.    FINDINGS:  There is new pleural parenchymal disease involving the right lower chest.  The more superior right lung and the left lung are clear.  No pneumothorax.  The trachea is midline.    The cardiac silhouette is normal in size. The hilar and mediastinal contours are unremarkable.    Bones are intact.                                  Assessment/Plan:       * Peritoneal carcinomatosis    New finding on CT abdomen:  Peritoneal carcinoma and right upper pelvic mass   No history of known malignancy.  likely metastatic right ovarian cancer  Heme/Onc following    2740  CEA 65.4   CT guided bx today      Pelvic mass    See above        Other hydronephrosis    CT shows right sided hydronephrosis secondary to multiple right retroperitoneal enlarged lymph nodes, as well as a right upper pelvic mass measuring 4.7 cm.   Urology consulted who recommended IVP  Will obtain IVP tomorrow AM- pt receiving contrast for bx today   Monitor renal function closely      UTI (urinary tract infection)    Cont Rocephin 1 g IV daily.  Blood cultures show NGTD  Urine culture shows gram neg kimberli- f/u on ID and sensitivities        Pleural effusion, right    Large right-sided pleural effusion  And large right lung mass.  Consulted Pulmonary for thoracentesis but unsuccessful  IR thoracentesis today   Supplemental oxygen to maintain saturations greater than 92%.  Bronchodilators as needed.       Lung mass    Thoracentesis today  F/u with heme/Onc        Morbid obesity    BMI 37.4.       Hyperlipidemia    Continue home dose statin.       H/O: hysterectomy            Hypertension    Continue home dose olmesartan, hydrochlorothiazide.  Monitor blood pressure closely and make adjustments as needed.         VTE Risk Mitigation (From admission, onward)        Ordered     Place sequential compression device  Until discontinued      01/26/19 1590              Sunita Hayward NP  Department of Hospital Medicine   [facility]

## 2019-01-28 NOTE — PLAN OF CARE
Problem: Adult Inpatient Plan of Care  Goal: Patient-Specific Goal (Individualization)  Outcome: Ongoing (interventions implemented as appropriate)  Pt AAOX4  Plan of care reviewed with pt . verbalize understanding  NSR on tele monitor. VSS. Iv's intact.  No c/o pain.  Fall precaution in place.Instruct to call for assistance.  Will continue to monitor.

## 2019-01-28 NOTE — INTERVAL H&P NOTE
The patient has been examined and the H&P has been reviewed:    I concur with the findings and no changes have occurred since H&P was written.        Active Hospital Problems    Diagnosis  POA    *Peritoneal carcinomatosis [C78.6, C80.1]  Yes    Pelvic mass [R19.00]  Yes    Other hydronephrosis [N13.39]  Yes    Lung mass [R91.8]  Yes    Pleural effusion, right [J90]  Yes    Morbid obesity [E66.01]  Yes    UTI (urinary tract infection) [N39.0]  Yes    H/O: hysterectomy [Z90.710]  Unknown      Resolved Hospital Problems   No resolved problems to display.

## 2019-01-28 NOTE — PLAN OF CARE
Problem: Adult Inpatient Plan of Care  Goal: Patient-Specific Goal (Individualization)  Outcome: Ongoing (interventions implemented as appropriate)  Pt in bed AAOx4. Plan of Care reviewed, Verbalized understanding.   Patient remained free from falls, fall precautions in place.  Patient is NSR on monitor.VSS.  Scheduled for thoracentesis today.  Call bell and personal belongings within reach. Hourly rounding complete. Reminded to call for assistance. No complaints at this time. Will continue to monitor.

## 2019-01-28 NOTE — ASSESSMENT & PLAN NOTE
Large right-sided pleural effusion  And large right lung mass.  Consulted Pulmonary for thoracentesis but unsuccessful  IR thoracentesis today   Supplemental oxygen to maintain saturations greater than 92%.  Bronchodilators as needed.

## 2019-01-28 NOTE — ASSESSMENT & PLAN NOTE
Cont Rocephin 1 g IV daily.  Blood cultures show NGTD  Urine culture shows gram neg kimberli- f/u on ID and sensitivities

## 2019-01-28 NOTE — PROGRESS NOTES
[facility] -   Hematology/Oncology  Progress Note    Patient Name: Casie Gaines  Admission Date: 1/25/2019  Hospital Length of Stay: 0 days  Code Status: No Order     Subjective:     HPI:    63-year-old female with a history of CHF, dyslipidemia, hypertension who presented to the emergency room with progressive shortness of breath over the previous month.  She also reports significant abdominal discomfort with slight distention.  01/25/2019 CT scan demonstrated numerous soft tissue masses within the left a abdominal cavity consistent with peritoneal carcinomatosis.  The patient has no personal history of malignancy and underwent hysterectomy in 2006 for fibroid uterus.  Imaging also demonstrated large right pleural effusion for which thoracentesis is planned.    Patient has a family history of colon cancer in her brother at age 62 and lung cancer in her brother at age 64  No history of breast or ovarian cancer    Interval History:   No acute events overnight  Jan 28, 2019: Reports improvement in SOB. Still with mild SOB on exertion. s/p Thoracentesis 860mL removed, pleural fluid sent for cytology and culture. S/p CT guided Abdominal mass biopsy.     Oncology Treatment Plan:   [No treatment plan]    Medications:  Continuous Infusions:   sodium chloride 0.9% 75 mL/hr at 01/28/19 1128     Scheduled Meds:   candesartan  16 mg Oral BID    And    hydroCHLOROthiazide  12.5 mg Oral Daily    cefTRIAXone (ROCEPHIN) IVPB  2 g Intravenous Q24H    pantoprazole  40 mg Oral Daily    rosuvastatin  10 mg Oral Daily     PRN Meds:acetaminophen, albuterol-ipratropium, aluminum-magnesium hydroxide-simethicone, cloNIDine, diphenhydrAMINE, guaifenesin 100 mg/5 ml, hydrALAZINE, omnipaque, ondansetron, oxyCODONE-acetaminophen, oxyCODONE-acetaminophen     Review of Systems   Constitutional: Positive for activity change and fatigue. Negative for appetite change, fever and unexpected weight change.   HENT: Negative  for congestion, dental problem, ear discharge, facial swelling, mouth sores, nosebleeds, rhinorrhea, sinus pressure, sinus pain, sore throat, trouble swallowing and voice change.    Eyes: Negative.    Respiratory: Positive for shortness of breath (With exertion). Negative for apnea, cough, choking and chest tightness.    Cardiovascular: Negative for chest pain and leg swelling.   Gastrointestinal: Positive for abdominal distention and abdominal pain. Negative for anal bleeding, blood in stool and constipation.   Endocrine: Negative.    Genitourinary: Negative for difficulty urinating, dyspareunia, dysuria, flank pain, frequency, hematuria, pelvic pain and vaginal bleeding.   Musculoskeletal: Negative for arthralgias, back pain and neck pain.   Skin: Negative for pallor, rash and wound.   Allergic/Immunologic: Negative.    Neurological: Negative for dizziness, tremors, seizures, syncope, facial asymmetry, speech difficulty, light-headedness, numbness and headaches.   Hematological: Negative for adenopathy. Does not bruise/bleed easily.   Psychiatric/Behavioral: Negative for agitation, behavioral problems, confusion, decreased concentration, dysphoric mood and hallucinations. The patient is not nervous/anxious and is not hyperactive.      Objective:     Vital Signs (Most Recent):  Temp: 97.9 °F (36.6 °C) (01/28/19 1606)  Pulse: 87 (01/28/19 1606)  Resp: 18 (01/28/19 1606)  BP: (!) 155/88 (01/28/19 1606)  SpO2: 96 % (01/28/19 1606) Vital Signs (24h Range):  Temp:  [97.9 °F (36.6 °C)-99.7 °F (37.6 °C)] 97.9 °F (36.6 °C)  Pulse:  [80-97] 87  Resp:  [1-20] 18  SpO2:  [91 %-97 %] 96 %  BP: (122-155)/(54-88) 155/88     Weight: 108.6 kg (239 lb 6.7 oz)  Body mass index is 37.5 kg/m².  Body surface area is 2.27 meters squared.      Intake/Output Summary (Last 24 hours) at 1/28/2019 1709  Last data filed at 1/28/2019 1029  Gross per 24 hour   Intake 510 ml   Output 900 ml   Net -390 ml       Physical Exam   Constitutional: She  is oriented to person, place, and time. She appears well-developed and well-nourished. No distress.   Well groomed   HENT:   Head: Normocephalic and atraumatic.   Right Ear: Tympanic membrane and ear canal normal.   Left Ear: Tympanic membrane and ear canal normal.   Nose: Nose normal. Right sinus exhibits no maxillary sinus tenderness and no frontal sinus tenderness. Left sinus exhibits no maxillary sinus tenderness and no frontal sinus tenderness.   Mouth/Throat: Oropharynx is clear and moist and mucous membranes are normal. No oral lesions. Normal dentition. No oropharyngeal exudate.   Eyes: Conjunctivae, EOM and lids are normal. Pupils are equal, round, and reactive to light. Lids are everted and swept, no foreign bodies found.   Neck: Trachea normal and normal range of motion. Neck supple. No tracheal deviation present. No thyroid mass and no thyromegaly present.   No crepitus   Cardiovascular: Normal rate, regular rhythm, normal heart sounds, intact distal pulses and normal pulses. Exam reveals no gallop and no friction rub.   No murmur heard.  Pulmonary/Chest: Effort normal. No stridor. No respiratory distress. She has decreased breath sounds in the right lower field. She has no wheezes. She has no rhonchi. She has no rales. She exhibits no tenderness.   Abdominal: Soft. Normal appearance and bowel sounds are normal. She exhibits no distension and no mass. There is no hepatosplenomegaly. There is tenderness (To deep palpation). There is no rebound and no guarding. No hernia.   Musculoskeletal: Normal range of motion. She exhibits no edema or deformity.   Neurological: She is alert and oriented to person, place, and time. She displays normal reflexes. No cranial nerve deficit. She exhibits normal muscle tone. Coordination normal.   Skin: Skin is warm, dry and intact. Capillary refill takes less than 2 seconds. No rash noted. She is not diaphoretic. No cyanosis. No pallor. Nails show no clubbing.   Psychiatric:  She has a normal mood and affect. Her behavior is normal. Judgment and thought content normal.       Significant Labs:   CBC:   Recent Labs   Lab 01/27/19 0423   WBC 12.24   HGB 11.9*   HCT 37.3      , CMP:   Recent Labs   Lab 01/27/19 0423      K 3.5      CO2 26   GLU 97   BUN 8   CREATININE 0.8   CALCIUM 9.2   PROT 6.8   ALBUMIN 3.2*   BILITOT 0.7   ALKPHOS 111   AST 36   ALT 35   ANIONGAP 8   EGFRNONAA >60   , Coagulation:   Recent Labs   Lab 01/28/19  0842   INR 1.0   APTT 27.6   , Haptoglobin: No results for input(s): HAPTOGLOBIN in the last 48 hours., Immunology: No results for input(s): SPEP, JULISSA, LOYD, FREELAMBDALI in the last 48 hours., LDH: No results for input(s): LDHCSF, BFSOURCE in the last 48 hours. and LFTs:   Recent Labs   Lab 01/27/19 0423   ALT 35   AST 36   ALKPHOS 111   BILITOT 0.7   PROT 6.8   ALBUMIN 3.2*       Diagnostic Results:  I have reviewed all pertinent imaging results/findings within the past 24 hours.    Assessment/Plan:     * Peritoneal carcinomatosis    Overall imaging consistent with metastatic malignancy/peritoneal carcinomatosis.  Planning CT-guided biopsy of 1 of the abdominal masses for tissue diagnosis and provide tissue for molecular studies.    --tumor markers pending  --planningCT-guided biopsy of 1 of the abdominal masses  --supportive care  --patient may be discharged with outpatient follow-up following biopsy    Jan. 28, 2019--Patient is s/p CT guided bx of abdominal mass, awaiting results. Thoracentesis also performed today with 860 ml removed. Pleural fluid sent for cytology and cultures, awaiting results. CEA 65.4,  2740. Likely metastatic ovarian cancer. IVP planned tomorrow per Urology for hydronephrosis. Continue IV abx therapy for UTI, repeat urine cx pending. Continue supportive care.       Pleural effusion, right    --planning thoracentesis with cytology    January 28, 2019--Thoracentesis today per IR(860ml removed). Pleural fluid sent  for cytology and culture. Awaiting results. Further recommendations pending results. Continue supportive care.         Thank you for your consult. I will follow-up with patient. Please contact us if you have any additional questions.     Jennifer Pineda NP  Hematology/Oncology  [facility] [place]

## 2019-01-28 NOTE — OP NOTE
Pre Op Diagnosis: abdominal mass, right pleural effusion     Post Op Diagnosis: same     Procedure:  abd mass biopsy, rt thoracentesis     Procedure performed by: Jeny VELARDE, Maurisio GARCIA     Written Informed Consent Obtained: Yes     Specimen Removed:  yes     Estimated Blood Loss:  minimal     Findings: Local anesthesia and moderate sedation were used.     The patient tolerated the procedure well and there were no complications.      Sterile technique was performed in the left lateral abdomen and right lateral thorax, lidocaine was used as a local anesthetic.  Multiple samples taken from LLQ abdomen mass and 860 ccs of jack fluid removed from right thorax and sent to lab for eval.  Pt tolerated the procedure well without immediate complications.  Please see radiologist report for details. F/u with PCP and/or ordering physician.

## 2019-01-28 NOTE — PROGRESS NOTES
[facility] -   Critical Care Medicine  Progress Note    Patient Name: Casie Gaines  MRN: [id number]  Admission Date: 1/25/2019  Hospital Length of Stay: 0 days  Code Status: No Order  Attending Provider: Timmy Carr MD  Primary Care Provider: Rossana Ang MD   Principal Problem: Peritoneal carcinomatosis    Subjective:     HPI:  63-year-old female patient presenting to the hospital for worsening shortness of breath. Twenty-five pack year smoking history.  Has family history of brothers with colon cancer and lung cancer .  No personal history of cancer.  Complains also of abdominal distention and generalized weakness.    Hospital/ICU Course:  I attempted to do ultrasound-guided thoracentesis after ultrasound of the right chest in the emergency room showed small right pleural effusion with septations.unsuccessful.  1/27 seen and examined.  Comfortable in bed.  On room air O2 sat 92%.  Mild SOB and abdominal distention.  1/28: Await Biopsy: Right effusion, Right hydronephrosis    Interval History:     Interval History: Seen and examined at bedside. Hospital chart reviewed. No acute interval detrimental events noted.Afebrile. She reports that she  is unchanged      Objective:     Vital Signs (Most Recent):  Temp: 98.6 °F (37 °C) (01/28/19 1147)  Pulse: 94 (01/28/19 1147)  Resp: 18 (01/28/19 1147)  BP: (!) 122/54 (01/28/19 1147)  SpO2: (!) 94 % (01/28/19 1147) Vital Signs (24h Range):  Temp:  [97.9 °F (36.6 °C)-99.7 °F (37.6 °C)] 98.6 °F (37 °C)  Pulse:  [80-98] 94  Resp:  [1-18] 18  SpO2:  [94 %-97 %] 94 %  BP: (122-150)/(54-73) 122/54     Weight: 108.6 kg (239 lb 6.7 oz)  Body mass index is 37.5 kg/m².      Intake/Output Summary (Last 24 hours) at 1/28/2019 1158  Last data filed at 1/28/2019 1029  Gross per 24 hour   Intake 750 ml   Output 900 ml   Net -150 ml       Physical Exam   Constitutional: She is oriented to person, place, and time. Vital signs are normal. She appears well-developed  and well-nourished.   HENT:   Head: Normocephalic and atraumatic.   Nose: Nose normal.   Mouth/Throat: No oropharyngeal exudate.   Eyes: Conjunctivae and EOM are normal. Pupils are equal, round, and reactive to light.   Neck: Normal range of motion. Neck supple.   Cardiovascular: Normal rate, normal heart sounds and intact distal pulses.   No murmur heard.  Pulmonary/Chest: Effort normal and breath sounds normal.   Abdominal: Soft. Bowel sounds are normal.   Musculoskeletal: Normal range of motion. She exhibits no edema.   Neurological: She is alert and oriented to person, place, and time.   Skin: Skin is warm and dry. Capillary refill takes 2 to 3 seconds.   Psychiatric: She has a normal mood and affect.   Nursing note and vitals reviewed.      Vents:       Lines/Drains/Airways     Peripheral Intravenous Line                 Peripheral IV - Single Lumen 01/27/19 1950 Left Forearm less than 1 day                Significant Labs:    CBC/Anemia Profile:  Recent Labs   Lab 01/27/19  0423   WBC 12.24   HGB 11.9*   HCT 37.3      MCV 86   RDW 15.2*        Chemistries:  Recent Labs   Lab 01/27/19  0423      K 3.5      CO2 26   BUN 8   CREATININE 0.8   CALCIUM 9.2   ALBUMIN 3.2*   PROT 6.8   BILITOT 0.7   ALKPHOS 111   ALT 35   AST 36   MG 2.0   PHOS 3.1       All pertinent labs within the past 24 hours have been reviewed.    Significant Imaging:  I have reviewed and interpreted all pertinent imaging results/findings within the past 24 hours.     CHEST CT  Moderate volume right pleural effusion. Passive atelectasis right lung base. Suggestion of a 2 cm nodular type finding in the right middle lobe surrounded by some atelectasis. Recommend follow-up chest CT in 1 month to demonstrate stability or   resolution.    Moderate right-sided hydroureteronephrosis likely secondary to multiple right retroperitoneal enlarged lymph nodes, as well as a right upper pelvic mass measuring 4.7 cm. Suggestion of prominence  of the right ovary in the pelvis measuring 5.5 x 3.8 cm.   The left ovary is unremarkable. Recommend further evaluation with pelvic ultrasound on a nonemergent basis.    A few small anterior omental nodules are noted a suspicious for peritoneal carcinomatosis, for example left kimani abdominal nodule 2.6 x 1.5 cm (series 2, image 134) and left anterior kimani abdominal 4.9 x 4.1 cm mass (image 108      ABG  No results for input(s): PH, PO2, PCO2, HCO3, BE in the last 168 hours.  Assessment/Plan:     Pulmonary   Pleural effusion, right    IR consult for thoracentesis or abdominal mass biopsy.        Renal/   UTI (urinary tract infection)    1/26 urine culture pending.  IV Rocephin.  UA positive nitrites and WBC.  1/27 gram-negative rods.  IV Rocephin.  Follow cultures.     H/O: hysterectomy    Done for uterine fibroid in the past.     Oncology   * Peritoneal carcinomatosis       elevated.  Likely ovarian primary.  Oncology follow-up.          I have reviewed all labs and imaging studies and compared to previous results. I have also discussed labs with all the teams in the medical care of the patient and my plan is outlined below          Bandar Bee MD  Critical Care Medicine  [facility]

## 2019-01-28 NOTE — PLAN OF CARE
Patient off floor for procedure. CM met with patient's daughter, Tiffanie, at bedside to assess discharge needs. Patient lives at home with spouse and is independent with needs. Patient ambulates safely independently and denies any recent falls. No dc needs identified at this time. Updated patient white board with current d/c plan and CM contact information. Encouraged patient to contact CM if any questions or concerns arise.    DC Plan: Home/selfcare     Transportation home at d/c: family     Contact: Cornelio Damon, spouse  Phone: [phone]    PCP:  Rossana Ang MD     Primary Payor:  [facility]        01/28/19 6808   Discharge Assessment   Assessment Type Discharge Planning Assessment   Confirmed/corrected address and phone number on facesheet? Yes   Assessment information obtained from? Patient   Prior to hospitilization cognitive status: Alert/Oriented   Prior to hospitalization functional status: Independent   Current cognitive status: Alert/Oriented   Current Functional Status: Independent   Lives With spouse  (Cornelio Gaines, spouse, [phone])   Able to Return to Prior Arrangements yes   Is patient able to care for self after discharge? Yes   Patient's perception of discharge disposition admitted as an inpatient   Readmission Within the Last 30 Days no previous admission in last 30 days   Patient currently being followed by outpatient case management? No   Patient currently receives any other outside agency services? No   Equipment Currently Used at Home none   Do you have any problems affording any of your prescribed medications? No   Is the patient taking medications as prescribed? yes   Does the patient have transportation home? Yes   Transportation Anticipated family or friend will provide   Does the patient receive services at the [facility]? No   Discharge Plan A Home   DME Needed Upon Discharge  none   Patient/Family in Agreement with Plan yes

## 2019-01-28 NOTE — ASSESSMENT & PLAN NOTE
--planning thoracentesis with cytology    January 28, 2019--Thoracentesis today per IR(860ml removed). Pleural fluid sent for cytology and culture. Awaiting results. Further recommendations pending results. Continue supportive care.

## 2019-01-28 NOTE — SUBJECTIVE & OBJECTIVE
Interval History:     Interval History: Seen and examined at bedside. Hospital chart reviewed. No acute interval detrimental events noted.Afebrile. She reports that she  is unchanged      Objective:     Vital Signs (Most Recent):  Temp: 98.6 °F (37 °C) (01/28/19 1147)  Pulse: 94 (01/28/19 1147)  Resp: 18 (01/28/19 1147)  BP: (!) 122/54 (01/28/19 1147)  SpO2: (!) 94 % (01/28/19 1147) Vital Signs (24h Range):  Temp:  [97.9 °F (36.6 °C)-99.7 °F (37.6 °C)] 98.6 °F (37 °C)  Pulse:  [80-98] 94  Resp:  [1-18] 18  SpO2:  [94 %-97 %] 94 %  BP: (122-150)/(54-73) 122/54     Weight: 108.6 kg (239 lb 6.7 oz)  Body mass index is 37.5 kg/m².      Intake/Output Summary (Last 24 hours) at 1/28/2019 1158  Last data filed at 1/28/2019 1029  Gross per 24 hour   Intake 750 ml   Output 900 ml   Net -150 ml       Physical Exam   Constitutional: She is oriented to person, place, and time. Vital signs are normal. She appears well-developed and well-nourished.   HENT:   Head: Normocephalic and atraumatic.   Nose: Nose normal.   Mouth/Throat: No oropharyngeal exudate.   Eyes: Conjunctivae and EOM are normal. Pupils are equal, round, and reactive to light.   Neck: Normal range of motion. Neck supple.   Cardiovascular: Normal rate, normal heart sounds and intact distal pulses.   No murmur heard.  Pulmonary/Chest: Effort normal and breath sounds normal.   Abdominal: Soft. Bowel sounds are normal.   Musculoskeletal: Normal range of motion. She exhibits no edema.   Neurological: She is alert and oriented to person, place, and time.   Skin: Skin is warm and dry. Capillary refill takes 2 to 3 seconds.   Psychiatric: She has a normal mood and affect.   Nursing note and vitals reviewed.      Vents:       Lines/Drains/Airways     Peripheral Intravenous Line                 Peripheral IV - Single Lumen 01/27/19 1950 Left Forearm less than 1 day                Significant Labs:    CBC/Anemia Profile:  Recent Labs   Lab 01/27/19  0423   WBC 12.24   HGB  11.9*   HCT 37.3      MCV 86   RDW 15.2*        Chemistries:  Recent Labs   Lab 01/27/19  0423      K 3.5      CO2 26   BUN 8   CREATININE 0.8   CALCIUM 9.2   ALBUMIN 3.2*   PROT 6.8   BILITOT 0.7   ALKPHOS 111   ALT 35   AST 36   MG 2.0   PHOS 3.1       All pertinent labs within the past 24 hours have been reviewed.    Significant Imaging:  I have reviewed and interpreted all pertinent imaging results/findings within the past 24 hours.     CHEST CT  Moderate volume right pleural effusion. Passive atelectasis right lung base. Suggestion of a 2 cm nodular type finding in the right middle lobe surrounded by some atelectasis. Recommend follow-up chest CT in 1 month to demonstrate stability or   resolution.    Moderate right-sided hydroureteronephrosis likely secondary to multiple right retroperitoneal enlarged lymph nodes, as well as a right upper pelvic mass measuring 4.7 cm. Suggestion of prominence of the right ovary in the pelvis measuring 5.5 x 3.8 cm.   The left ovary is unremarkable. Recommend further evaluation with pelvic ultrasound on a nonemergent basis.    A few small anterior omental nodules are noted a suspicious for peritoneal carcinomatosis, for example left kimani abdominal nodule 2.6 x 1.5 cm (series 2, image 134) and left anterior kimani abdominal 4.9 x 4.1 cm mass (image 108

## 2019-01-28 NOTE — SEDATION DOCUMENTATION
Procedure complete. Patient tolerated well.  VSS.  860 ml of jack fluid pulled from right chest.  Band Aid to right lung puncture site - C/D/I.  Left upper abdomen puncture site dressed with band aid - C/D/I.  Patient has no complaints of pain/discomfort/SOB

## 2019-01-28 NOTE — ASSESSMENT & PLAN NOTE
Overall imaging consistent with metastatic malignancy/peritoneal carcinomatosis.  Planning CT-guided biopsy of 1 of the abdominal masses for tissue diagnosis and provide tissue for molecular studies.    --tumor markers pending  --planningCT-guided biopsy of 1 of the abdominal masses  --supportive care  --patient may be discharged with outpatient follow-up following biopsy    Jan. 28, 2019--Patient is s/p CT guided bx of abdominal mass, awaiting results. Thoracentesis also performed today with 860 ml removed. Pleural fluid sent for cytology and cultures, awaiting results. CEA 65.4,  2740. Likely metastatic ovarian cancer. IVP planned tomorrow per Urology for hydronephrosis. Continue IV abx therapy for UTI, repeat urine cx pending. Continue supportive care.

## 2019-01-28 NOTE — SUBJECTIVE & OBJECTIVE
Interval History: no issues voiced    Review of Systems   Constitutional: Positive for appetite change, chills and fatigue. Negative for diaphoresis and fever.   HENT: Negative.  Negative for congestion, nosebleeds and sinus pressure.    Eyes: Negative.  Negative for visual disturbance.   Respiratory: Positive for cough (Nonproductive cough) and shortness of breath. Negative for chest tightness and wheezing.    Cardiovascular: Positive for leg swelling. Negative for chest pain and palpitations.   Gastrointestinal: Positive for abdominal pain (Generalized) and nausea. Negative for blood in stool, diarrhea and vomiting.   Endocrine: Negative.  Negative for polyuria.   Genitourinary: Negative.  Negative for dysuria, flank pain, frequency and urgency.   Musculoskeletal: Negative.  Negative for back pain, joint swelling and neck stiffness.   Skin: Negative.  Negative for color change, pallor and rash.   Allergic/Immunologic: Negative.  Negative for immunocompromised state.   Neurological: Negative.  Negative for dizziness, syncope, speech difficulty, numbness and headaches.   Hematological: Negative.  Does not bruise/bleed easily.   Psychiatric/Behavioral: Negative.  Negative for confusion, decreased concentration and hallucinations. The patient is not nervous/anxious.    All other systems reviewed and are negative.      Objective:     Vital Signs (Most Recent):  Temp: 98.2 °F (36.8 °C) (01/28/19 0751)  Pulse: 86 (01/28/19 0915)  Resp: 18 (01/28/19 0751)  BP: (!) 140/69 (01/28/19 0751)  SpO2: (!) 94 % (01/28/19 0751) Vital Signs (24h Range):  Temp:  [97.9 °F (36.6 °C)-99.7 °F (37.6 °C)] 98.2 °F (36.8 °C)  Pulse:  [80-98] 86  Resp:  [1-18] 18  SpO2:  [93 %-97 %] 94 %  BP: (130-150)/(65-78) 140/69     Weight: 108.6 kg (239 lb 6.7 oz)  Body mass index is 37.5 kg/m².    Intake/Output Summary (Last 24 hours) at 1/28/2019 1043  Last data filed at 1/28/2019 4304  Gross per 24 hour   Intake 750 ml   Output 300 ml   Net 450 ml       Physical Exam   Constitutional: She is oriented to person, place, and time. No distress.   Morbidly obese  female, in no respiratory distress, comfortably lying in bed.  No family at the bedside.   HENT:   Head: Normocephalic and atraumatic.   Eyes: Conjunctivae and EOM are normal. No scleral icterus.   Neck: Normal range of motion. Neck supple.   Cardiovascular: Normal rate, regular rhythm, normal heart sounds and intact distal pulses.   No murmur heard.  Pulmonary/Chest: Effort normal. No accessory muscle usage. No respiratory distress. She has decreased breath sounds in the right middle field and the right lower field. She has no wheezes. She has no rales. She exhibits no tenderness.   Abdominal: Soft. Bowel sounds are normal. She exhibits no distension. There is tenderness (Vague generalized abdominal discomfort on palpation).   Musculoskeletal: Normal range of motion. She exhibits edema. She exhibits no tenderness.   Neurological: She is alert and oriented to person, place, and time. No cranial nerve deficit. She exhibits normal muscle tone. Coordination normal.   Skin: Skin is warm and dry. She is not diaphoretic. No erythema.   Psychiatric: She has a normal mood and affect. Her behavior is normal. Thought content normal.   Nursing note and vitals reviewed.        Significant Labs:   CBC:   Recent Labs   Lab 01/27/19  0423   WBC 12.24   HGB 11.9*   HCT 37.3        CMP:   Recent Labs   Lab 01/27/19  0423      K 3.5      CO2 26   GLU 97   BUN 8   CREATININE 0.8   CALCIUM 9.2   PROT 6.8   ALBUMIN 3.2*   BILITOT 0.7   ALKPHOS 111   AST 36   ALT 35   ANIONGAP 8   EGFRNONAA >60     All pertinent labs within the past 24 hours have been reviewed.    Significant Imaging: I have reviewed all pertinent imaging results/findings within the past 24 hours.   Imaging Results          CT Chest Abdoment Pelvis With Contrast (Final result)  Result time 01/26/19 09:01:55    Final result by  Sheng Mosqueda MD (01/26/19 09:01:55)                 Impression:      Moderate volume right pleural effusion. Passive atelectasis right lung base.  Suggestion of a 2 cm nodular type finding in the right middle lobe surrounded by some atelectasis. Recommend follow-up chest CT in 1 month to demonstrate stability or resolution.    Moderate right-sided hydroureteronephrosis likely secondary to multiple right retroperitoneal enlarged lymph nodes, as well as a right upper pelvic mass measuring 4.7 cm.  Suggestion of prominence of the right ovary in the pelvis measuring 5.5 x 3.8 cm. The left ovary is unremarkable. Recommend further evaluation with pelvic ultrasound on a nonemergent basis.    A few small anterior omental nodules are noted a suspicious for peritoneal carcinomatosis, for example left kimani abdominal nodule 2.6 x 1.5 cm (series 2, image 134) and left anterior kimani abdominal 4.9 x 4.1 cm mass (image 108).      Electronically signed by: Sheng Mosqueda  Date:    01/26/2019  Time:    09:01             Narrative:    EXAMINATION:  CT CHEST ABDOMEN PELVIS WITH CONTRAST (XPD)    CLINICAL HISTORY:  RLQ pain and SOB;    TECHNIQUE:  Low dose axial images, sagittal and coronal reformations were obtained from the thoracic inlet to the pubic symphysis following the IV administration of 75 mL of Omnipaque 350 .   oral contrast was not administered. All CT scans at this location are performed using dose modulation techniques as appropriate to a performed exam including the following: Automated exposure control; adjustment of the mA and/or kV  according to patient size.    COMPARISON:  None    FINDINGS:  Thyroid unremarkable.    Moderate volume right pleural effusion.  Passive atelectasis right lung base.  Suggestion of a 2 cm nodular type finding in the right middle lobe surrounded by some atelectasis.  Recommend follow-up chest CT in 1 month to demonstrate stability or resolution.    Liver, spleen unremarkable.    Moderate  right-sided hydroureteronephrosis likely secondary to multiple right retroperitoneal enlarged lymph nodes, as well as a right upper pelvic mass measuring 4.7 cm, possibly ovarian in origin.  Suggestion of prominence of the right ovary in the pelvis measuring 5.5 x 3.8 cm.  The left ovary is unremarkable.  Recommend further evaluation with pelvic ultrasound on a nonemergent basis.    A few small anterior omental nodules are noted a suspicious for peritoneal carcinomatosis, for example left kimani abdominal nodule 2.6 x 1.5 cm (series 2, image 134) and left anterior kimani abdominal 4.9 x 4.1 cm mass (image 108).    Left kidney unremarkable.    No evidence of bowel obstruction.  No abscess.    Pancolonic diverticulosis without evidence of diverticulitis.    Trace free fluid in the pelvis.    Uterus likely surgically absent.                               X-Ray Chest PA And Lateral (Final result)  Result time 01/25/19 23:27:16    Final result by Inocencio Francis Jr., MD (01/25/19 23:27:16)                 Impression:      Pleural-parenchymal disease on the right may relate to pneumonia.  Follow-up to resolution is recommended.      Electronically signed by: Inocencio Francis Jr., MD  Date:    01/25/2019  Time:    23:27             Narrative:    EXAMINATION:  XR CHEST PA AND LATERAL    CLINICAL HISTORY:  Shortness of breath    TECHNIQUE:  PA and lateral views of the chest were performed.    COMPARISON:  PA and lateral from January 31, 2018.    FINDINGS:  There is new pleural parenchymal disease involving the right lower chest.  The more superior right lung and the left lung are clear.  No pneumothorax.  The trachea is midline.    The cardiac silhouette is normal in size. The hilar and mediastinal contours are unremarkable.    Bones are intact.

## 2019-01-28 NOTE — SUBJECTIVE & OBJECTIVE
Interval History:   No acute events overnight  Jan 28, 2019: Reports improvement in SOB. Still with mild SOB on exertion. s/p Thoracentesis 860mL removed, pleural fluid sent for cytology and culture. S/p CT guided Abdominal mass biopsy.     Oncology Treatment Plan:   [No treatment plan]    Medications:  Continuous Infusions:   sodium chloride 0.9% 75 mL/hr at 01/28/19 1128     Scheduled Meds:   candesartan  16 mg Oral BID    And    hydroCHLOROthiazide  12.5 mg Oral Daily    cefTRIAXone (ROCEPHIN) IVPB  2 g Intravenous Q24H    pantoprazole  40 mg Oral Daily    rosuvastatin  10 mg Oral Daily     PRN Meds:acetaminophen, albuterol-ipratropium, aluminum-magnesium hydroxide-simethicone, cloNIDine, diphenhydrAMINE, guaifenesin 100 mg/5 ml, hydrALAZINE, omnipaque, ondansetron, oxyCODONE-acetaminophen, oxyCODONE-acetaminophen     Review of Systems   Constitutional: Positive for activity change and fatigue. Negative for appetite change, fever and unexpected weight change.   HENT: Negative for congestion, dental problem, ear discharge, facial swelling, mouth sores, nosebleeds, rhinorrhea, sinus pressure, sinus pain, sore throat, trouble swallowing and voice change.    Eyes: Negative.    Respiratory: Positive for shortness of breath (With exertion). Negative for apnea, cough, choking and chest tightness.    Cardiovascular: Negative for chest pain and leg swelling.   Gastrointestinal: Positive for abdominal distention and abdominal pain. Negative for anal bleeding, blood in stool and constipation.   Endocrine: Negative.    Genitourinary: Negative for difficulty urinating, dyspareunia, dysuria, flank pain, frequency, hematuria, pelvic pain and vaginal bleeding.   Musculoskeletal: Negative for arthralgias, back pain and neck pain.   Skin: Negative for pallor, rash and wound.   Allergic/Immunologic: Negative.    Neurological: Negative for dizziness, tremors, seizures, syncope, facial asymmetry, speech difficulty,  light-headedness, numbness and headaches.   Hematological: Negative for adenopathy. Does not bruise/bleed easily.   Psychiatric/Behavioral: Negative for agitation, behavioral problems, confusion, decreased concentration, dysphoric mood and hallucinations. The patient is not nervous/anxious and is not hyperactive.      Objective:     Vital Signs (Most Recent):  Temp: 97.9 °F (36.6 °C) (01/28/19 1606)  Pulse: 87 (01/28/19 1606)  Resp: 18 (01/28/19 1606)  BP: (!) 155/88 (01/28/19 1606)  SpO2: 96 % (01/28/19 1606) Vital Signs (24h Range):  Temp:  [97.9 °F (36.6 °C)-99.7 °F (37.6 °C)] 97.9 °F (36.6 °C)  Pulse:  [80-97] 87  Resp:  [1-20] 18  SpO2:  [91 %-97 %] 96 %  BP: (122-155)/(54-88) 155/88     Weight: 108.6 kg (239 lb 6.7 oz)  Body mass index is 37.5 kg/m².  Body surface area is 2.27 meters squared.      Intake/Output Summary (Last 24 hours) at 1/28/2019 1709  Last data filed at 1/28/2019 1029  Gross per 24 hour   Intake 510 ml   Output 900 ml   Net -390 ml       Physical Exam   Constitutional: She is oriented to person, place, and time. She appears well-developed and well-nourished. No distress.   Well groomed   HENT:   Head: Normocephalic and atraumatic.   Right Ear: Tympanic membrane and ear canal normal.   Left Ear: Tympanic membrane and ear canal normal.   Nose: Nose normal. Right sinus exhibits no maxillary sinus tenderness and no frontal sinus tenderness. Left sinus exhibits no maxillary sinus tenderness and no frontal sinus tenderness.   Mouth/Throat: Oropharynx is clear and moist and mucous membranes are normal. No oral lesions. Normal dentition. No oropharyngeal exudate.   Eyes: Conjunctivae, EOM and lids are normal. Pupils are equal, round, and reactive to light. Lids are everted and swept, no foreign bodies found.   Neck: Trachea normal and normal range of motion. Neck supple. No tracheal deviation present. No thyroid mass and no thyromegaly present.   No crepitus   Cardiovascular: Normal rate, regular  rhythm, normal heart sounds, intact distal pulses and normal pulses. Exam reveals no gallop and no friction rub.   No murmur heard.  Pulmonary/Chest: Effort normal. No stridor. No respiratory distress. She has decreased breath sounds in the right lower field. She has no wheezes. She has no rhonchi. She has no rales. She exhibits no tenderness.   Abdominal: Soft. Normal appearance and bowel sounds are normal. She exhibits no distension and no mass. There is no hepatosplenomegaly. There is tenderness (To deep palpation). There is no rebound and no guarding. No hernia.   Musculoskeletal: Normal range of motion. She exhibits no edema or deformity.   Neurological: She is alert and oriented to person, place, and time. She displays normal reflexes. No cranial nerve deficit. She exhibits normal muscle tone. Coordination normal.   Skin: Skin is warm, dry and intact. Capillary refill takes less than 2 seconds. No rash noted. She is not diaphoretic. No cyanosis. No pallor. Nails show no clubbing.   Psychiatric: She has a normal mood and affect. Her behavior is normal. Judgment and thought content normal.       Significant Labs:   CBC:   Recent Labs   Lab 01/27/19 0423   WBC 12.24   HGB 11.9*   HCT 37.3      , CMP:   Recent Labs   Lab 01/27/19 0423      K 3.5      CO2 26   GLU 97   BUN 8   CREATININE 0.8   CALCIUM 9.2   PROT 6.8   ALBUMIN 3.2*   BILITOT 0.7   ALKPHOS 111   AST 36   ALT 35   ANIONGAP 8   EGFRNONAA >60   , Coagulation:   Recent Labs   Lab 01/28/19  0842   INR 1.0   APTT 27.6   , Haptoglobin: No results for input(s): HAPTOGLOBIN in the last 48 hours., Immunology: No results for input(s): SPEP, JULISSA, LOYD, FREELAMBDALI in the last 48 hours., LDH: No results for input(s): LDHCSF, BFSOURCE in the last 48 hours. and LFTs:   Recent Labs   Lab 01/27/19 0423   ALT 35   AST 36   ALKPHOS 111   BILITOT 0.7   PROT 6.8   ALBUMIN 3.2*       Diagnostic Results:  I have reviewed all pertinent imaging  results/findings within the past 24 hours.

## 2019-01-29 ENCOUNTER — ANESTHESIA EVENT (OUTPATIENT)
Dept: SURGERY | Facility: HOSPITAL | Age: 64
DRG: 988 | End: 2019-01-29
Payer: COMMERCIAL

## 2019-01-29 LAB
ALBUMIN SERPL BCP-MCNC: 3.3 G/DL
ALP SERPL-CCNC: 122 U/L
ALT SERPL W/O P-5'-P-CCNC: 33 U/L
ANION GAP SERPL CALC-SCNC: 11 MMOL/L
AST SERPL-CCNC: 33 U/L
BACTERIA UR CULT: NO GROWTH
BASOPHILS # BLD AUTO: 0.05 K/UL
BASOPHILS NFR BLD: 0.5 %
BILIRUB SERPL-MCNC: 0.5 MG/DL
BUN SERPL-MCNC: 9 MG/DL
CALCIUM SERPL-MCNC: 9.3 MG/DL
CHLORIDE SERPL-SCNC: 106 MMOL/L
CO2 SERPL-SCNC: 23 MMOL/L
CREAT SERPL-MCNC: 0.7 MG/DL
DIFFERENTIAL METHOD: ABNORMAL
EOSINOPHIL # BLD AUTO: 0.2 K/UL
EOSINOPHIL NFR BLD: 2.6 %
ERYTHROCYTE [DISTWIDTH] IN BLOOD BY AUTOMATED COUNT: 14.7 %
EST. GFR  (AFRICAN AMERICAN): >60 ML/MIN/1.73 M^2
EST. GFR  (NON AFRICAN AMERICAN): >60 ML/MIN/1.73 M^2
GLUCOSE SERPL-MCNC: 101 MG/DL
HCT VFR BLD AUTO: 36.7 %
HGB BLD-MCNC: 11.7 G/DL
LYMPHOCYTES # BLD AUTO: 1.5 K/UL
LYMPHOCYTES NFR BLD: 15.8 %
MCH RBC QN AUTO: 26.8 PG
MCHC RBC AUTO-ENTMCNC: 31.9 G/DL
MCV RBC AUTO: 84 FL
MONOCYTES # BLD AUTO: 0.7 K/UL
MONOCYTES NFR BLD: 7.5 %
NEUTROPHILS # BLD AUTO: 6.7 K/UL
NEUTROPHILS NFR BLD: 73.6 %
PATH INTERP FLD-IMP: NORMAL
PLATELET # BLD AUTO: 329 K/UL
PMV BLD AUTO: 9.8 FL
POTASSIUM SERPL-SCNC: 3.9 MMOL/L
PROT SERPL-MCNC: 7 G/DL
RBC # BLD AUTO: 4.36 M/UL
SODIUM SERPL-SCNC: 140 MMOL/L
WBC # BLD AUTO: 9.16 K/UL

## 2019-01-29 PROCEDURE — 99223 1ST HOSP IP/OBS HIGH 75: CPT | Mod: ,,, | Performed by: UROLOGY

## 2019-01-29 PROCEDURE — 80053 COMPREHEN METABOLIC PANEL: CPT

## 2019-01-29 PROCEDURE — 94761 N-INVAS EAR/PLS OXIMETRY MLT: CPT

## 2019-01-29 PROCEDURE — 36415 COLL VENOUS BLD VENIPUNCTURE: CPT

## 2019-01-29 PROCEDURE — 99232 PR SUBSEQUENT HOSPITAL CARE,LEVL II: ICD-10-PCS | Mod: ,,, | Performed by: INTERNAL MEDICINE

## 2019-01-29 PROCEDURE — 85025 COMPLETE CBC W/AUTO DIFF WBC: CPT

## 2019-01-29 PROCEDURE — 25500020 PHARM REV CODE 255: Performed by: INTERNAL MEDICINE

## 2019-01-29 PROCEDURE — 25000003 PHARM REV CODE 250: Performed by: NURSE PRACTITIONER

## 2019-01-29 PROCEDURE — 99223 PR INITIAL HOSPITAL CARE,LEVL III: ICD-10-PCS | Mod: ,,, | Performed by: INTERNAL MEDICINE

## 2019-01-29 PROCEDURE — 99232 SBSQ HOSP IP/OBS MODERATE 35: CPT | Mod: ,,, | Performed by: INTERNAL MEDICINE

## 2019-01-29 PROCEDURE — 25000003 PHARM REV CODE 250: Performed by: INTERNAL MEDICINE

## 2019-01-29 PROCEDURE — 21400001 HC TELEMETRY ROOM

## 2019-01-29 PROCEDURE — 99223 PR INITIAL HOSPITAL CARE,LEVL III: ICD-10-PCS | Mod: ,,, | Performed by: UROLOGY

## 2019-01-29 PROCEDURE — 63600175 PHARM REV CODE 636 W HCPCS: Performed by: INTERNAL MEDICINE

## 2019-01-29 PROCEDURE — 99223 1ST HOSP IP/OBS HIGH 75: CPT | Mod: ,,, | Performed by: INTERNAL MEDICINE

## 2019-01-29 RX ADMIN — CANDESARTAN CILEXETIL 16 MG: 16 TABLET ORAL at 08:01

## 2019-01-29 RX ADMIN — SODIUM CHLORIDE: 0.9 INJECTION, SOLUTION INTRAVENOUS at 05:01

## 2019-01-29 RX ADMIN — ROSUVASTATIN CALCIUM 10 MG: 10 TABLET, FILM COATED ORAL at 08:01

## 2019-01-29 RX ADMIN — HYDROCHLOROTHIAZIDE 12.5 MG: 12.5 TABLET ORAL at 08:01

## 2019-01-29 RX ADMIN — CEFTRIAXONE 2 G: 2 INJECTION, SOLUTION INTRAVENOUS at 05:01

## 2019-01-29 RX ADMIN — PANTOPRAZOLE SODIUM 40 MG: 40 TABLET, DELAYED RELEASE ORAL at 08:01

## 2019-01-29 RX ADMIN — IOHEXOL 100 ML: 300 INJECTION, SOLUTION INTRAVENOUS at 10:01

## 2019-01-29 NOTE — NURSING
Called made to [facility] spoke to Zeyad. Orders added on from previous thoracentesis fluid  this AM sent to [facility]. Orders not released from radiololgy this AM.

## 2019-01-29 NOTE — ASSESSMENT & PLAN NOTE
CT shows right sided hydronephrosis secondary to multiple right retroperitoneal enlarged lymph nodes, as well as a right upper pelvic mass measuring 4.7 cm.   Urology consulted who recommended IVP  Will obtain IVP tomorrow AM- pt receiving contrast for bx today   Monitor renal function closely     1/29  Likely due to Abdominal Mass  Await Path  Heme Onc

## 2019-01-29 NOTE — PROGRESS NOTES
[facility] Hospital Medicine  Progress Note    Patient Name: Casie Gaines  MRN: [id number]  Patient Class: IP- Inpatient   Admission Date: 1/25/2019  Length of Stay: 1 days  Attending Physician: Timmy Carr MD  Primary Care Provider: Rossana Ang MD        Subjective:     Principal Problem:Peritoneal carcinomatosis    HPI:  Ms. Gaines is a 63-year-old  female with PMH significant for HTN, hyperlipidemia, presents to [facility] ED complaining of progressively worsening shortness of breath for the past 3-4 weeks, associated with left abdominal discomfort.  She reports chills but no fever.  Complains of shortness of breath without cough, congestion.  Denies hemoptysis or recent weight loss.  In the ED chest x-ray revealed large right sided pleural effusion.  CT abdomen revealed numerous soft tissue masses within the left kimani abdomen, suggestive of peritoneal carcinomatosis.  Patient has no known history of malignancy.  Unknown primary.  In addition patient has urinary tract infection with many bacteria, greater than 100 WBC, positive nitrites and leukocyte esterases.  She was started on IV Rocephin in the ED.  Admitting diagnosis large pleural effusion of unknown etiology, and new diagnosis of peritoneal carcinomatosis.  Oncology and Pulmonary consultations placed.    Hospital Course:  Pt admitted for Large right-sided pleural effusion of unknown etiology and new finding of peritoneal carcinomatosis in the abdomen on CT abd. CT abd also showed moderate right sided hydronephrosis. Pulm consulted for thoracentesis but unsuccessful after 3 attempts. IR consulted for CT-guided biopsy of abdominal masses for tissue diagnosis and molecular studies. IR also consulted for thoracentesis and CT guided biopsy.     Rocephin started for UTI. Urine culture shows gram negative kimberli.     Heme/Onc consulted. Tumor markers showed  is 2740, CEA 65.4.     Discussed right sided  hydronephrosis with Urology who recommended IVP- IVP can not bee done until tomorrow due to contrast injection today     1/29  Patients/p IVP. Plan for stent placement from Urology tomorrow. Path pending. Patient appropriate and conversant.    Interval History: Pt awaiting stent placement per Urology.. Planned for AM    Review of Systems   Constitutional: Positive for activity change, appetite change and fatigue. Negative for unexpected weight change.   HENT: Negative.  Negative for trouble swallowing.    Eyes: Negative.    Respiratory: Positive for shortness of breath. Negative for cough and wheezing.    Cardiovascular: Negative.  Negative for chest pain.   Gastrointestinal: Positive for abdominal distention.   Endocrine: Negative.    Genitourinary: Negative.    Musculoskeletal: Negative.    Skin: Negative.    Allergic/Immunologic: Negative.    Neurological: Positive for weakness. Negative for dizziness.   Hematological: Negative.    Psychiatric/Behavioral: Negative.    All other systems reviewed and are negative.    Objective:     Vital Signs (Most Recent):  Temp: 98.5 °F (36.9 °C) (01/29/19 1127)  Pulse: 81 (01/29/19 1127)  Resp: 18 (01/29/19 1127)  BP: 123/62 (01/29/19 1127)  SpO2: (!) 94 % (01/29/19 1127) Vital Signs (24h Range):  Temp:  [97.6 °F (36.4 °C)-99 °F (37.2 °C)] 98.5 °F (36.9 °C)  Pulse:  [80-99] 81  Resp:  [16-18] 18  SpO2:  [91 %-96 %] 94 %  BP: (122-150)/(60-71) 123/62     Weight: 108.6 kg (239 lb 6.7 oz)  Body mass index is 37.5 kg/m².    Intake/Output Summary (Last 24 hours) at 1/29/2019 1637  Last data filed at 1/29/2019 1200  Gross per 24 hour   Intake 1376 ml   Output 500 ml   Net 876 ml      Physical Exam   Constitutional: She is oriented to person, place, and time. She appears well-developed and well-nourished.   HENT:   Head: Normocephalic and atraumatic.   Eyes: EOM are normal. Pupils are equal, round, and reactive to light.   Neck: Normal range of motion. Neck supple. No JVD present.    Cardiovascular: Normal rate, regular rhythm and intact distal pulses.   Murmur heard.   Systolic murmur is present with a grade of 2/6.  Pulmonary/Chest: Effort normal and breath sounds normal. No respiratory distress. She has no wheezes.   Abdominal: Soft. Bowel sounds are normal. She exhibits distension. There is tenderness.   Musculoskeletal: Normal range of motion. She exhibits no edema or tenderness.   Neurological: She is alert and oriented to person, place, and time. She has normal reflexes. No cranial nerve deficit.   Skin: Skin is warm and dry. Capillary refill takes 2 to 3 seconds. No erythema.   Psychiatric: She has a normal mood and affect. Her behavior is normal. Judgment and thought content normal.   Nursing note and vitals reviewed.      Significant Labs:   BMP:   Recent Labs   Lab 01/29/19  0935         K 3.9      CO2 23   BUN 9   CREATININE 0.7   CALCIUM 9.3     CBC:   Recent Labs   Lab 01/29/19  0935   WBC 9.16   HGB 11.7*   HCT 36.7*        CMP:   Recent Labs   Lab 01/28/19 1957 01/29/19  0935   NA  --  140   K  --  3.9   CL  --  106   CO2  --  23   GLU  --  101   BUN  --  9   CREATININE  --  0.7   CALCIUM  --  9.3   PROT 6.9 7.0   ALBUMIN  --  3.3*   BILITOT  --  0.5   ALKPHOS  --  122   AST  --  33   ALT  --  33   ANIONGAP  --  11   EGFRNONAA  --  >60     All pertinent labs within the past 24 hours have been reviewed.    Significant Imaging: I have reviewed all pertinent imaging results/findings within the past 24 hours.    Assessment/Plan:      * Peritoneal carcinomatosis    New finding on CT abdomen:  Peritoneal carcinoma and right upper pelvic mass   No history of known malignancy.  likely metastatic right ovarian cancer  Heme/Onc following    2740  CEA 65.4   CT guided bx today     1/29  Heme Onc  S/p Bx - Path Pending  Symptom Management     Lung mass    Thoracentesis today  F/u with heme/Onc        Other hydronephrosis    CT shows right sided  hydronephrosis secondary to multiple right retroperitoneal enlarged lymph nodes, as well as a right upper pelvic mass measuring 4.7 cm.   Urology consulted who recommended IVP  Will obtain IVP tomorrow AM- pt receiving contrast for bx today   Monitor renal function closely     1/29  Likely due to Abdominal Mass  Await Path  Heme Onc     Pelvic mass    See above     1/29  Heme Onc  Await path  Concerning for Ovarian CA with Mets         UTI (urinary tract infection)    Cont Rocephin 1 g IV daily.  Blood cultures show NGTD  Urine culture shows gram neg kimberli- f/u on ID and sensitivities     1/29  Follow Cultures  ABX  Urology on board       Morbid obesity    BMI 37.4.    1/29  Diet  Nutrition       Pleural effusion, right    Large right-sided pleural effusion  And large right lung mass.  Consulted Pulmonary for thoracentesis but unsuccessful  IR thoracentesis today   Supplemental oxygen to maintain saturations greater than 92%.  Bronchodilators as needed.    1/29  S/p thoracentesis with > 800 cc drained.  Pulmonary on case  Nebs  ABX       Hyperlipidemia    Continue home dose statin.       H/O: hysterectomy            Hypertension    Continue home dose olmesartan, hydrochlorothiazide.  Monitor blood pressure closely and make adjustments as needed.    1/29  Clonidine  ARB  Monitor         VTE Risk Mitigation (From admission, onward)        Ordered     Place sequential compression device  Until discontinued      01/26/19 0533              Timmy Carr MD  Department of Hospital Medicine   [facility]

## 2019-01-29 NOTE — SUBJECTIVE & OBJECTIVE
Interval History: Seen and examined at bedside. Hospital chart reviewed. No acute interval detrimental events noted.Afebrile. She reports that she  is unchanged      Objective:     Vital Signs (Most Recent):  Temp: 97.6 °F (36.4 °C) (01/29/19 0838)  Pulse: 86 (01/29/19 0837)  Resp: 18 (01/29/19 0837)  BP: 122/63 (01/29/19 0837)  SpO2: 95 % (01/29/19 0837) Vital Signs (24h Range):  Temp:  [97.6 °F (36.4 °C)-99 °F (37.2 °C)] 97.6 °F (36.4 °C)  Pulse:  [80-99] 86  Resp:  [14-20] 18  SpO2:  [91 %-97 %] 95 %  BP: (122-155)/(54-88) 122/63     Weight: 108.6 kg (239 lb 6.7 oz)  Body mass index is 37.5 kg/m².      Intake/Output Summary (Last 24 hours) at 1/29/2019 1106  Last data filed at 1/29/2019 0408  Gross per 24 hour   Intake 1140 ml   Output 1125 ml   Net 15 ml       Physical Exam   Constitutional: She is oriented to person, place, and time. Vital signs are normal. She appears well-developed and well-nourished.   HENT:   Head: Normocephalic and atraumatic.   Nose: Nose normal.   Mouth/Throat: No oropharyngeal exudate.   Eyes: Conjunctivae and EOM are normal. Pupils are equal, round, and reactive to light.   Neck: Normal range of motion. Neck supple.   Cardiovascular: Normal rate, normal heart sounds and intact distal pulses.   No murmur heard.  Pulmonary/Chest: Effort normal and breath sounds normal.   Abdominal: Soft. Bowel sounds are normal.   Musculoskeletal: Normal range of motion. She exhibits no edema.   Neurological: She is alert and oriented to person, place, and time.   Skin: Skin is warm and dry. Capillary refill takes 2 to 3 seconds.   Psychiatric: She has a normal mood and affect.   Nursing note and vitals reviewed.      Vents:       Lines/Drains/Airways     Peripheral Intravenous Line                 Peripheral IV - Single Lumen 01/27/19 1950 Left Forearm 1 day                Significant Labs:    CBC/Anemia Profile:  Recent Labs   Lab 01/29/19  0935   WBC 9.16   HGB 11.7*   HCT 36.7*      MCV 84    RDW 14.7*        Chemistries:  Recent Labs   Lab 01/28/19 1957 01/29/19  0935   NA  --  140   K  --  3.9   CL  --  106   CO2  --  23   BUN  --  9   CREATININE  --  0.7   CALCIUM  --  9.3   ALBUMIN  --  3.3*   PROT 6.9 7.0   BILITOT  --  0.5   ALKPHOS  --  122   ALT  --  33   AST  --  33       Pathology Results  (Last 10 years)    None        Component      Latest Ref Rng & Units 1/28/2019   Body Fluid Type       Thoracentesis Fluid   Fluid Appearance       Cloudy   Fluid Color       Red   WBC, Body Fluid      /cu mm 4101   Segs, Fluid      % 13   Lymphs, Fluid      % 72   Monocytes/Macrophages, Fluid      % 14   Eos, Fluid      % 1       Significant Imaging:  I have reviewed and interpreted all pertinent imaging results/findings within the past 24 hours.

## 2019-01-29 NOTE — PROGRESS NOTES
PT WENT DOWN FOR CT. BACK TO ROOM. ALERT AND ORIENTED. NO DISTRESS NOTED. IV LEAKING. WILL RESITE.

## 2019-01-29 NOTE — PLAN OF CARE
Problem: Adult Inpatient Plan of Care  Goal: Patient-Specific Goal (Individualization)  Outcome: Ongoing (interventions implemented as appropriate)  Pt in bed AAOx4. Plan of Care reviewed, Verbalized understanding.   Patient remained free from falls, fall precautions in place.  Patient is NSR on monitor.VSS.  NPO at midnight for testing.  Call bell and personal belongings within reach. Hourly rounding complete. Reminded to call for assistance. No complaints at this time. Will continue to monitor.

## 2019-01-29 NOTE — PLAN OF CARE
Problem: Adult Inpatient Plan of Care  Goal: Patient-Specific Goal (Individualization)  Outcome: Ongoing (interventions implemented as appropriate)  Pt AAOX4  POC reviewed with pt. verbalize understanding.  Iv's intact. NS infusing 75 ml/hr continuous .NSR on tele.VSS  Thoracentesis and Biopsy performed . Pt tolerated well.  No sign of distress. No c/o pain.   Instructed to call for assistance.  Fall precautions in place.  Will continue to montior

## 2019-01-29 NOTE — PROGRESS NOTES
[facility] -   Hematology/Oncology  Progress Note    Patient Name: Casie Gaines  Admission Date: 1/25/2019  Hospital Length of Stay: 1 days  Code Status: Full Code     Subjective:     HPI:    63-year-old female with a history of CHF, dyslipidemia, hypertension who presented to the emergency room with progressive shortness of breath over the previous month.  She also reports significant abdominal discomfort with slight distention.  01/25/2019 CT scan demonstrated numerous soft tissue masses within the left a abdominal cavity consistent with peritoneal carcinomatosis.  The patient has no personal history of malignancy and underwent hysterectomy in 2006 for fibroid uterus.  Imaging also demonstrated large right pleural effusion for which thoracentesis is planned.    Patient has a family history of colon cancer in her brother at age 62 and lung cancer in her brother at age 64  No history of breast or ovarian cancer    Interval History: IVP today. No evidence of malignant cells noted on WBC & Diff Body fluid obtained from thoracentesis fluid. Awaiting pleural fluid cytology path report. Awaiting abdominal mass biopsy results    Oncology Treatment Plan:   [No treatment plan]    Medications:  Continuous Infusions:   sodium chloride 0.9% 75 mL/hr at 01/29/19 0538     Scheduled Meds:   candesartan  16 mg Oral BID    And    hydroCHLOROthiazide  12.5 mg Oral Daily    cefTRIAXone (ROCEPHIN) IVPB  2 g Intravenous Q24H    pantoprazole  40 mg Oral Daily    rosuvastatin  10 mg Oral Daily     PRN Meds:acetaminophen, albuterol-ipratropium, aluminum-magnesium hydroxide-simethicone, cloNIDine, diphenhydrAMINE, guaifenesin 100 mg/5 ml, hydrALAZINE, omnipaque, ondansetron, oxyCODONE-acetaminophen, oxyCODONE-acetaminophen     Review of Systems  Objective:     Vital Signs (Most Recent):  Temp: 98.5 °F (36.9 °C) (01/29/19 1127)  Pulse: 81 (01/29/19 1127)  Resp: 18 (01/29/19 1127)  BP: 123/62 (01/29/19 1127)  SpO2:  (!) 94 % (01/29/19 1127) Vital Signs (24h Range):  Temp:  [97.6 °F (36.4 °C)-99 °F (37.2 °C)] 98.5 °F (36.9 °C)  Pulse:  [80-99] 81  Resp:  [16-18] 18  SpO2:  [91 %-96 %] 94 %  BP: (122-155)/(60-88) 123/62     Weight: 108.6 kg (239 lb 6.7 oz)  Body mass index is 37.5 kg/m².  Body surface area is 2.27 meters squared.      Intake/Output Summary (Last 24 hours) at 1/29/2019 1406  Last data filed at 1/29/2019 0408  Gross per 24 hour   Intake 1140 ml   Output 775 ml   Net 365 ml       Physical Exam    Significant Labs:   BMP:   Recent Labs   Lab 01/29/19  0935         K 3.9      CO2 23   BUN 9   CREATININE 0.7   CALCIUM 9.3   , CBC:   Recent Labs   Lab 01/29/19  0935   WBC 9.16   HGB 11.7*   HCT 36.7*      , CMP:   Recent Labs   Lab 01/28/19 1957 01/29/19  0935   NA  --  140   K  --  3.9   CL  --  106   CO2  --  23   GLU  --  101   BUN  --  9   CREATININE  --  0.7   CALCIUM  --  9.3   PROT 6.9 7.0   ALBUMIN  --  3.3*   BILITOT  --  0.5   ALKPHOS  --  122   AST  --  33   ALT  --  33   ANIONGAP  --  11   EGFRNONAA  --  >60   , LFTs:   Recent Labs   Lab 01/28/19 1957 01/29/19  0935   ALT  --  33   AST  --  33   ALKPHOS  --  122   BILITOT  --  0.5   PROT 6.9 7.0   ALBUMIN  --  3.3*   , Tumor Markers: No results for input(s): PSA, CEA, , AFPTM, [id number],  in the last 48 hours.    Invalid input(s): ALGTM, Urine Studies: No results for input(s): COLORU, APPEARANCEUA, PHUR, SPECGRAV, PROTEINUA, GLUCUA, KETONESU, BILIRUBINUA, OCCULTUA, NITRITE, UROBILINOGEN, LEUKOCYTESUR, RBCUA, WBCUA, BACTERIA, SQUAMEPITHEL, HYALINECASTS in the last 48 hours.    Invalid input(s): WRIGHTSUR and All pertinent labs from the last 24 hours have been reviewed.    Diagnostic Results:  I have reviewed all pertinent imaging results/findings within the past 24 hours.    Assessment/Plan:     * Peritoneal carcinomatosis    Overall imaging consistent with metastatic malignancy/peritoneal carcinomatosis.  Planning CT-guided  biopsy of 1 of the abdominal masses for tissue diagnosis and provide tissue for molecular studies.    --tumor markers pending  --planningCT-guided biopsy of 1 of the abdominal masses  --supportive care  --patient may be discharged with outpatient follow-up following biopsy    Jan. 28, 2019--Patient is s/p CT guided bx of abdominal mass, awaiting results. Thoracentesis also performed today with 860 ml removed. Pleural fluid sent for cytology and cultures, awaiting results. CEA 65.4,  2740. Likely metastatic ovarian cancer. IVP planned tomorrow per Urology for hydronephrosis. Continue IV abx therapy for UTI, repeat urine cx pending. Continue supportive care.    Jan 29, 2019--Patient is s/p CT guided bx of abdominal mass, awaiting results. S/p thoracentesis. No evidence of malignant cells noted on WBC & Diff of pleural fluid. Awaiting cytology of pleural fluid. Awaiting pelvic mass biopsy results. Further recommendations pending results. IVP per Urology today for hydronephrosis. Continue IV abx therapy for UTI, repeat urine cx pending. Continue supportive care.        Pleural effusion, right    --planning thoracentesis with cytology    January 28, 2019--Thoracentesis today per IR(860ml removed). Pleural fluid sent for cytology and culture. Awaiting results. Further recommendations pending results. Continue supportive care.    Jan. 29, 2019--No evidence of malignant cells noted on WBC & Diff of pleural fluid. Awaiting cytology of pleural fluid. Awaiting pelvic mass biopsy results. Further recommendations pending results. Continue supportive care.           Thank you for your consult. I will follow-up with patient. Please contact us if you have any additional questions.     Jennifer Pineda NP  Hematology/Oncology  [facility] [place]

## 2019-01-29 NOTE — ASSESSMENT & PLAN NOTE
Overall imaging consistent with metastatic malignancy/peritoneal carcinomatosis.  Planning CT-guided biopsy of 1 of the abdominal masses for tissue diagnosis and provide tissue for molecular studies.    --tumor markers pending  --planningCT-guided biopsy of 1 of the abdominal masses  --supportive care  --patient may be discharged with outpatient follow-up following biopsy    Jan. 28, 2019--Patient is s/p CT guided bx of abdominal mass, awaiting results. Thoracentesis also performed today with 860 ml removed. Pleural fluid sent for cytology and cultures, awaiting results. CEA 65.4,  2740. Likely metastatic ovarian cancer. IVP planned tomorrow per Urology for hydronephrosis. Continue IV abx therapy for UTI, repeat urine cx pending. Continue supportive care.    Jan 29, 2019--Patient is s/p CT guided bx of abdominal mass, awaiting results. S/p thoracentesis. No evidence of malignant cells noted on WBC & Diff of pleural fluid. Awaiting cytology of pleural fluid. Awaiting pelvic mass biopsy results. Further recommendations pending results. IVP per Urology today for hydronephrosis. Continue IV abx therapy for UTI, repeat urine cx pending. Continue supportive care.

## 2019-01-29 NOTE — ASSESSMENT & PLAN NOTE
Continue home dose olmesartan, hydrochlorothiazide.  Monitor blood pressure closely and make adjustments as needed.    1/29  Clonidine  ARB  Monitor

## 2019-01-29 NOTE — SUBJECTIVE & OBJECTIVE
Interval History: Pt awaiting stent placement per Urology.. Planned for AM    Review of Systems   Constitutional: Positive for activity change, appetite change and fatigue. Negative for unexpected weight change.   HENT: Negative.  Negative for trouble swallowing.    Eyes: Negative.    Respiratory: Positive for shortness of breath. Negative for cough and wheezing.    Cardiovascular: Negative.  Negative for chest pain.   Gastrointestinal: Positive for abdominal distention.   Endocrine: Negative.    Genitourinary: Negative.    Musculoskeletal: Negative.    Skin: Negative.    Allergic/Immunologic: Negative.    Neurological: Positive for weakness. Negative for dizziness.   Hematological: Negative.    Psychiatric/Behavioral: Negative.    All other systems reviewed and are negative.    Objective:     Vital Signs (Most Recent):  Temp: 98.5 °F (36.9 °C) (01/29/19 1127)  Pulse: 81 (01/29/19 1127)  Resp: 18 (01/29/19 1127)  BP: 123/62 (01/29/19 1127)  SpO2: (!) 94 % (01/29/19 1127) Vital Signs (24h Range):  Temp:  [97.6 °F (36.4 °C)-99 °F (37.2 °C)] 98.5 °F (36.9 °C)  Pulse:  [80-99] 81  Resp:  [16-18] 18  SpO2:  [91 %-96 %] 94 %  BP: (122-150)/(60-71) 123/62     Weight: 108.6 kg (239 lb 6.7 oz)  Body mass index is 37.5 kg/m².    Intake/Output Summary (Last 24 hours) at 1/29/2019 1637  Last data filed at 1/29/2019 1200  Gross per 24 hour   Intake 1376 ml   Output 500 ml   Net 876 ml      Physical Exam   Constitutional: She is oriented to person, place, and time. She appears well-developed and well-nourished.   HENT:   Head: Normocephalic and atraumatic.   Eyes: EOM are normal. Pupils are equal, round, and reactive to light.   Neck: Normal range of motion. Neck supple. No JVD present.   Cardiovascular: Normal rate, regular rhythm and intact distal pulses.   Murmur heard.   Systolic murmur is present with a grade of 2/6.  Pulmonary/Chest: Effort normal and breath sounds normal. No respiratory distress. She has no wheezes.    Abdominal: Soft. Bowel sounds are normal. She exhibits distension. There is tenderness.   Musculoskeletal: Normal range of motion. She exhibits no edema or tenderness.   Neurological: She is alert and oriented to person, place, and time. She has normal reflexes. No cranial nerve deficit.   Skin: Skin is warm and dry. Capillary refill takes 2 to 3 seconds. No erythema.   Psychiatric: She has a normal mood and affect. Her behavior is normal. Judgment and thought content normal.   Nursing note and vitals reviewed.      Significant Labs:   BMP:   Recent Labs   Lab 01/29/19  0935         K 3.9      CO2 23   BUN 9   CREATININE 0.7   CALCIUM 9.3     CBC:   Recent Labs   Lab 01/29/19  0935   WBC 9.16   HGB 11.7*   HCT 36.7*        CMP:   Recent Labs   Lab 01/28/19 1957 01/29/19  0935   NA  --  140   K  --  3.9   CL  --  106   CO2  --  23   GLU  --  101   BUN  --  9   CREATININE  --  0.7   CALCIUM  --  9.3   PROT 6.9 7.0   ALBUMIN  --  3.3*   BILITOT  --  0.5   ALKPHOS  --  122   AST  --  33   ALT  --  33   ANIONGAP  --  11   EGFRNONAA  --  >60     All pertinent labs within the past 24 hours have been reviewed.    Significant Imaging: I have reviewed all pertinent imaging results/findings within the past 24 hours.

## 2019-01-29 NOTE — CONSULTS
Chief Complaint: Right ureteral obstruction    HPI:   19: 62 yo woman presented with multiple complaints and imaging revealed right hydronephrosis with right pelvic mass and lymphadenopathy.  Percutaneous biopsy results pending.  IVP shows obstruction of right ureter at pelvic brim.  No prior abd/pelvic pain and no exac/rel factors but some lately..  No hematuria.  No urolithiasis.  No urinary bother.  No  history.      Allergies:  Patient has no known allergies.    Medications: see MAR    Review of Systems:  General: No fever, chills, fatigability, or weight loss.  Skin: No rashes, itching, or changes in color or texture of skin.  Chest: Denies DAVIDSON, cyanosis, wheezing, cough, and sputum production.  Abdomen: Appetite fine. No weight loss. Denies diarrhea, abdominal pain, hematemesis, or blood in stool.  Musculoskeletal: Some joint stiffness or swelling. Some back pain.  : As above.  All other review of systems negative.    PMH:   has a past medical history of Anemia, Anxiety, Arthritis, CHF (congestive heart failure), Hyperlipemia, Hypertension, and Neck pain.    PSH:   has a past surgical history that includes  section; Dilation and curettage of uterus; Hysterectomy; Tubal ligation; and breast reduction (10/02/2018).    FamHx: family history includes Anesthesia problems in her other; Breast cancer in her maternal aunt and paternal aunt; Colon cancer in her brother; Ovarian cancer in her paternal aunt.    SocHx:  reports that she quit smoking about 3 months ago. She has a 25.00 pack-year smoking history. she has never used smokeless tobacco. She reports that she does not drink alcohol or use drugs.     Physical Exam:  Vitals:   Vitals:    19 0838   BP:    Pulse:    Resp:    Temp: 97.6 °F (36.4 °C)     General: A&Ox3. No apparent distress. No deformities.  Neck: No masses. Normal thyroid.  Lungs: normal inspiration. No use of accessory muscles.  Heart: normal pulse. No arrhythmias.  Abdomen:  Soft. NT. ND. Obese  Lymphatic: Neck and groin nodes negative.  Skin: The skin is warm and dry. No jaundice.  Ext: No c/c/e.  : deferred    Labs/Studies: see chart    Impression/Plan:   1. Right ureteral obstruction merits stent; if fails will recc right nephrostomy.  Upper ureter tortuous behind a transition point at pelvic brim.  Sig chance of stent failure.  Discussed risks/benefits of stent with pt and all questions answered.  Will get it done tomorrow unless circumstances dictate differently.

## 2019-01-29 NOTE — ASSESSMENT & PLAN NOTE
New finding on CT abdomen:  Peritoneal carcinoma and right upper pelvic mass   No history of known malignancy.  likely metastatic right ovarian cancer  Heme/Onc following    2740  CEA 65.4   CT guided bx today     1/29  Heme Onc  S/p Bx - Path Pending  Symptom Management

## 2019-01-29 NOTE — ASSESSMENT & PLAN NOTE
Cont Rocephin 1 g IV daily.  Blood cultures show NGTD  Urine culture shows gram neg kimberli- f/u on ID and sensitivities     1/29  Follow Cultures  ABX  Urology on board

## 2019-01-29 NOTE — PROGRESS NOTES
[facility] -   Pulmonology  Progress Note    Patient Name: Casie Gaines  MRN: [id number]  Admission Date: 1/25/2019  Hospital Length of Stay: 1 days  Code Status: Full Code  Attending Provider: Timmy Carr MD  Primary Care Provider: Rossana Ang MD   Principal Problem: Peritoneal carcinomatosis    Subjective:       Interval History: Seen and examined at bedside. Hospital chart reviewed. No acute interval detrimental events noted.Afebrile. She reports that she  is unchanged      Objective:     Vital Signs (Most Recent):  Temp: 97.6 °F (36.4 °C) (01/29/19 0838)  Pulse: 86 (01/29/19 0837)  Resp: 18 (01/29/19 0837)  BP: 122/63 (01/29/19 0837)  SpO2: 95 % (01/29/19 0837) Vital Signs (24h Range):  Temp:  [97.6 °F (36.4 °C)-99 °F (37.2 °C)] 97.6 °F (36.4 °C)  Pulse:  [80-99] 86  Resp:  [14-20] 18  SpO2:  [91 %-97 %] 95 %  BP: (122-155)/(54-88) 122/63     Weight: 108.6 kg (239 lb 6.7 oz)  Body mass index is 37.5 kg/m².      Intake/Output Summary (Last 24 hours) at 1/29/2019 1106  Last data filed at 1/29/2019 0408  Gross per 24 hour   Intake 1140 ml   Output 1125 ml   Net 15 ml       Physical Exam   Constitutional: She is oriented to person, place, and time. Vital signs are normal. She appears well-developed and well-nourished.   HENT:   Head: Normocephalic and atraumatic.   Nose: Nose normal.   Mouth/Throat: No oropharyngeal exudate.   Eyes: Conjunctivae and EOM are normal. Pupils are equal, round, and reactive to light.   Neck: Normal range of motion. Neck supple.   Cardiovascular: Normal rate, normal heart sounds and intact distal pulses.   No murmur heard.  Pulmonary/Chest: Effort normal and breath sounds normal.   Abdominal: Soft. Bowel sounds are normal.   Musculoskeletal: Normal range of motion. She exhibits no edema.   Neurological: She is alert and oriented to person, place, and time.   Skin: Skin is warm and dry. Capillary refill takes 2 to 3 seconds.   Psychiatric: She has a normal mood  and affect.   Nursing note and vitals reviewed.      Vents:       Lines/Drains/Airways     Peripheral Intravenous Line                 Peripheral IV - Single Lumen 01/27/19 1950 Left Forearm 1 day                Significant Labs:    CBC/Anemia Profile:  Recent Labs   Lab 01/29/19  0935   WBC 9.16   HGB 11.7*   HCT 36.7*      MCV 84   RDW 14.7*        Chemistries:  Recent Labs   Lab 01/28/19 1957 01/29/19  0935   NA  --  140   K  --  3.9   CL  --  106   CO2  --  23   BUN  --  9   CREATININE  --  0.7   CALCIUM  --  9.3   ALBUMIN  --  3.3*   PROT 6.9 7.0   BILITOT  --  0.5   ALKPHOS  --  122   ALT  --  33   AST  --  33       Pathology Results  (Last 10 years)    None        Component      Latest Ref Rng & Units 1/28/2019   Body Fluid Type       Thoracentesis Fluid   Fluid Appearance       Cloudy   Fluid Color       Red   WBC, Body Fluid      /cu mm 4101   Segs, Fluid      % 13   Lymphs, Fluid      % 72   Monocytes/Macrophages, Fluid      % 14   Eos, Fluid      % 1       Significant Imaging:  I have reviewed and interpreted all pertinent imaging results/findings within the past 24 hours.      ABG  No results for input(s): PH, PO2, PCO2, HCO3, BE in the last 168 hours.  Assessment/Plan:     Pleural effusion, right    860 mls effusion drained, Differential: Lymphocytic  CXR stable          Will follow final cytology  Needs follow in office  Sig off for now, call for any Pulmonary issues       I have reviewed all labs and imaging studies and compared to previous results. I have also discussed labs with all the teams in the medical care of the patient and my plan is outlined below        Bandar Bee MD  Pulmonology  [facility] -

## 2019-01-29 NOTE — SUBJECTIVE & OBJECTIVE
Interval History: IVP today. No evidence of malignant cells noted on WBC & Diff Body fluid obtained from thoracentesis fluid. Awaiting pleural fluid cytology path report. Awaiting abdominal mass biopsy results    Oncology Treatment Plan:   [No treatment plan]    Medications:  Continuous Infusions:   sodium chloride 0.9% 75 mL/hr at 01/29/19 0538     Scheduled Meds:   candesartan  16 mg Oral BID    And    hydroCHLOROthiazide  12.5 mg Oral Daily    cefTRIAXone (ROCEPHIN) IVPB  2 g Intravenous Q24H    pantoprazole  40 mg Oral Daily    rosuvastatin  10 mg Oral Daily     PRN Meds:acetaminophen, albuterol-ipratropium, aluminum-magnesium hydroxide-simethicone, cloNIDine, diphenhydrAMINE, guaifenesin 100 mg/5 ml, hydrALAZINE, omnipaque, ondansetron, oxyCODONE-acetaminophen, oxyCODONE-acetaminophen     Review of Systems  Objective:     Vital Signs (Most Recent):  Temp: 98.5 °F (36.9 °C) (01/29/19 1127)  Pulse: 81 (01/29/19 1127)  Resp: 18 (01/29/19 1127)  BP: 123/62 (01/29/19 1127)  SpO2: (!) 94 % (01/29/19 1127) Vital Signs (24h Range):  Temp:  [97.6 °F (36.4 °C)-99 °F (37.2 °C)] 98.5 °F (36.9 °C)  Pulse:  [80-99] 81  Resp:  [16-18] 18  SpO2:  [91 %-96 %] 94 %  BP: (122-155)/(60-88) 123/62     Weight: 108.6 kg (239 lb 6.7 oz)  Body mass index is 37.5 kg/m².  Body surface area is 2.27 meters squared.      Intake/Output Summary (Last 24 hours) at 1/29/2019 1406  Last data filed at 1/29/2019 0408  Gross per 24 hour   Intake 1140 ml   Output 775 ml   Net 365 ml       Physical Exam    Significant Labs:   BMP:   Recent Labs   Lab 01/29/19  0935         K 3.9      CO2 23   BUN 9   CREATININE 0.7   CALCIUM 9.3   , CBC:   Recent Labs   Lab 01/29/19  0935   WBC 9.16   HGB 11.7*   HCT 36.7*      , CMP:   Recent Labs   Lab 01/28/19 1957 01/29/19  0935   NA  --  140   K  --  3.9   CL  --  106   CO2  --  23   GLU  --  101   BUN  --  9   CREATININE  --  0.7   CALCIUM  --  9.3   PROT 6.9 7.0   ALBUMIN  --   3.3*   BILITOT  --  0.5   ALKPHOS  --  122   AST  --  33   ALT  --  33   ANIONGAP  --  11   EGFRNONAA  --  >60   , LFTs:   Recent Labs   Lab 01/28/19 1957 01/29/19  0935   ALT  --  33   AST  --  33   ALKPHOS  --  122   BILITOT  --  0.5   PROT 6.9 7.0   ALBUMIN  --  3.3*   , Tumor Markers: No results for input(s): PSA, CEA, , AFPTM, [id number],  in the last 48 hours.    Invalid input(s): ALGTM, Urine Studies: No results for input(s): COLORU, APPEARANCEUA, PHUR, SPECGRAV, PROTEINUA, GLUCUA, KETONESU, BILIRUBINUA, OCCULTUA, NITRITE, UROBILINOGEN, LEUKOCYTESUR, RBCUA, WBCUA, BACTERIA, SQUAMEPITHEL, HYALINECASTS in the last 48 hours.    Invalid input(s): WRIGHTSUR and All pertinent labs from the last 24 hours have been reviewed.    Diagnostic Results:  I have reviewed all pertinent imaging results/findings within the past 24 hours.

## 2019-01-30 ENCOUNTER — ANESTHESIA (OUTPATIENT)
Dept: SURGERY | Facility: HOSPITAL | Age: 64
DRG: 988 | End: 2019-01-30
Payer: COMMERCIAL

## 2019-01-30 VITALS
WEIGHT: 239.44 LBS | HEIGHT: 67 IN | HEART RATE: 81 BPM | DIASTOLIC BLOOD PRESSURE: 72 MMHG | TEMPERATURE: 98 F | OXYGEN SATURATION: 92 % | RESPIRATION RATE: 16 BRPM | SYSTOLIC BLOOD PRESSURE: 127 MMHG | BODY MASS INDEX: 37.58 KG/M2

## 2019-01-30 PROBLEM — N39.0 UTI (URINARY TRACT INFECTION): Status: RESOLVED | Noted: 2019-01-26 | Resolved: 2019-01-30

## 2019-01-30 PROBLEM — J90 PLEURAL EFFUSION, RIGHT: Status: RESOLVED | Noted: 2019-01-26 | Resolved: 2019-01-30

## 2019-01-30 PROBLEM — N13.5 URETERAL OBSTRUCTION: Status: RESOLVED | Noted: 2019-01-30 | Resolved: 2019-01-30

## 2019-01-30 PROBLEM — N13.5 URETERAL OBSTRUCTION: Status: ACTIVE | Noted: 2019-01-30

## 2019-01-30 PROBLEM — N13.39 OTHER HYDRONEPHROSIS: Status: RESOLVED | Noted: 2019-01-28 | Resolved: 2019-01-30

## 2019-01-30 LAB
ALBUMIN SERPL BCP-MCNC: 3.1 G/DL
ALP SERPL-CCNC: 112 U/L
ALT SERPL W/O P-5'-P-CCNC: 30 U/L
ANION GAP SERPL CALC-SCNC: 7 MMOL/L
AST SERPL-CCNC: 31 U/L
BASOPHILS # BLD AUTO: 0.08 K/UL
BASOPHILS NFR BLD: 0.9 %
BILIRUB SERPL-MCNC: 0.3 MG/DL
BUN SERPL-MCNC: 10 MG/DL
CALCIUM SERPL-MCNC: 9.7 MG/DL
CHLORIDE SERPL-SCNC: 108 MMOL/L
CO2 SERPL-SCNC: 26 MMOL/L
CREAT SERPL-MCNC: 0.7 MG/DL
DIFFERENTIAL METHOD: ABNORMAL
EOSINOPHIL # BLD AUTO: 0.3 K/UL
EOSINOPHIL NFR BLD: 3.4 %
ERYTHROCYTE [DISTWIDTH] IN BLOOD BY AUTOMATED COUNT: 14.7 %
EST. GFR  (AFRICAN AMERICAN): >60 ML/MIN/1.73 M^2
EST. GFR  (NON AFRICAN AMERICAN): >60 ML/MIN/1.73 M^2
GLUCOSE SERPL-MCNC: 99 MG/DL
HCT VFR BLD AUTO: 37.5 %
HGB BLD-MCNC: 11.8 G/DL
LYMPHOCYTES # BLD AUTO: 1.7 K/UL
LYMPHOCYTES NFR BLD: 18 %
MCH RBC QN AUTO: 26.7 PG
MCHC RBC AUTO-ENTMCNC: 31.5 G/DL
MCV RBC AUTO: 85 FL
MONOCYTES # BLD AUTO: 0.7 K/UL
MONOCYTES NFR BLD: 7.3 %
NEUTROPHILS # BLD AUTO: 6.5 K/UL
NEUTROPHILS NFR BLD: 70.4 %
PLATELET # BLD AUTO: 338 K/UL
PMV BLD AUTO: 10.2 FL
POTASSIUM SERPL-SCNC: 3.9 MMOL/L
PROT SERPL-MCNC: 6.8 G/DL
RBC # BLD AUTO: 4.42 M/UL
SODIUM SERPL-SCNC: 141 MMOL/L
WBC # BLD AUTO: 9.22 K/UL

## 2019-01-30 PROCEDURE — 74420 UROGRAPHY RTRGR +-KUB: CPT | Mod: 26,,, | Performed by: UROLOGY

## 2019-01-30 PROCEDURE — 99233 PR SUBSEQUENT HOSPITAL CARE,LEVL III: ICD-10-PCS | Mod: ,,, | Performed by: INTERNAL MEDICINE

## 2019-01-30 PROCEDURE — 36000707: Performed by: UROLOGY

## 2019-01-30 PROCEDURE — C1769 GUIDE WIRE: HCPCS | Performed by: UROLOGY

## 2019-01-30 PROCEDURE — 71000033 HC RECOVERY, INTIAL HOUR: Performed by: UROLOGY

## 2019-01-30 PROCEDURE — C2617 STENT, NON-COR, TEM W/O DEL: HCPCS | Performed by: UROLOGY

## 2019-01-30 PROCEDURE — C1758 CATHETER, URETERAL: HCPCS | Performed by: UROLOGY

## 2019-01-30 PROCEDURE — 76000 PR  FLUOROSCOPE EXAMINATION: ICD-10-PCS | Mod: 26,59,, | Performed by: UROLOGY

## 2019-01-30 PROCEDURE — 74420 PR  X-RAY RETROGRADE PYELOGRAM: ICD-10-PCS | Mod: 26,,, | Performed by: UROLOGY

## 2019-01-30 PROCEDURE — 36000706: Performed by: UROLOGY

## 2019-01-30 PROCEDURE — 25000003 PHARM REV CODE 250: Performed by: NURSE ANESTHETIST, CERTIFIED REGISTERED

## 2019-01-30 PROCEDURE — 85025 COMPLETE CBC W/AUTO DIFF WBC: CPT

## 2019-01-30 PROCEDURE — 25000003 PHARM REV CODE 250: Performed by: NURSE PRACTITIONER

## 2019-01-30 PROCEDURE — 99233 SBSQ HOSP IP/OBS HIGH 50: CPT | Mod: 25,,, | Performed by: UROLOGY

## 2019-01-30 PROCEDURE — 25000003 PHARM REV CODE 250: Performed by: ANESTHESIOLOGY

## 2019-01-30 PROCEDURE — 99233 SBSQ HOSP IP/OBS HIGH 50: CPT | Mod: ,,, | Performed by: INTERNAL MEDICINE

## 2019-01-30 PROCEDURE — 94761 N-INVAS EAR/PLS OXIMETRY MLT: CPT

## 2019-01-30 PROCEDURE — 25500020 PHARM REV CODE 255: Performed by: UROLOGY

## 2019-01-30 PROCEDURE — 52332 CYSTOSCOPY AND TREATMENT: CPT | Mod: RT,,, | Performed by: UROLOGY

## 2019-01-30 PROCEDURE — 76000 FLUOROSCOPY <1 HR PHYS/QHP: CPT | Mod: 26,59,, | Performed by: UROLOGY

## 2019-01-30 PROCEDURE — 80053 COMPREHEN METABOLIC PANEL: CPT

## 2019-01-30 PROCEDURE — 25000003 PHARM REV CODE 250: Performed by: INTERNAL MEDICINE

## 2019-01-30 PROCEDURE — 99233 PR SUBSEQUENT HOSPITAL CARE,LEVL III: ICD-10-PCS | Mod: 25,,, | Performed by: UROLOGY

## 2019-01-30 PROCEDURE — 36415 COLL VENOUS BLD VENIPUNCTURE: CPT

## 2019-01-30 PROCEDURE — 63600175 PHARM REV CODE 636 W HCPCS: Performed by: NURSE ANESTHETIST, CERTIFIED REGISTERED

## 2019-01-30 PROCEDURE — 52332 PR CYSTOSCOPY,INSERT URETERAL STENT: ICD-10-PCS | Mod: RT,,, | Performed by: UROLOGY

## 2019-01-30 PROCEDURE — 37000009 HC ANESTHESIA EA ADD 15 MINS: Performed by: UROLOGY

## 2019-01-30 PROCEDURE — 37000008 HC ANESTHESIA 1ST 15 MINUTES: Performed by: UROLOGY

## 2019-01-30 DEVICE — STENT URETERAL UNIV 7FR 24CM: Type: IMPLANTABLE DEVICE | Site: BLADDER | Status: FUNCTIONAL

## 2019-01-30 RX ORDER — PROPOFOL 10 MG/ML
INJECTION, EMULSION INTRAVENOUS
Status: DISCONTINUED | OUTPATIENT
Start: 2019-01-30 | End: 2019-01-30

## 2019-01-30 RX ORDER — ONDANSETRON 2 MG/ML
INJECTION INTRAMUSCULAR; INTRAVENOUS
Status: DISCONTINUED | OUTPATIENT
Start: 2019-01-30 | End: 2019-01-30

## 2019-01-30 RX ORDER — METOCLOPRAMIDE HYDROCHLORIDE 5 MG/ML
10 INJECTION INTRAMUSCULAR; INTRAVENOUS EVERY 10 MIN PRN
Status: DISCONTINUED | OUTPATIENT
Start: 2019-01-30 | End: 2019-01-30 | Stop reason: HOSPADM

## 2019-01-30 RX ORDER — SODIUM CHLORIDE 0.9 % (FLUSH) 0.9 %
3 SYRINGE (ML) INJECTION
Status: DISCONTINUED | OUTPATIENT
Start: 2019-01-30 | End: 2019-01-30 | Stop reason: HOSPADM

## 2019-01-30 RX ORDER — MEPERIDINE HYDROCHLORIDE 50 MG/ML
12.5 INJECTION INTRAMUSCULAR; INTRAVENOUS; SUBCUTANEOUS ONCE AS NEEDED
Status: DISCONTINUED | OUTPATIENT
Start: 2019-01-30 | End: 2019-01-30 | Stop reason: HOSPADM

## 2019-01-30 RX ORDER — OXYCODONE AND ACETAMINOPHEN 10; 325 MG/1; MG/1
1 TABLET ORAL EVERY 8 HOURS PRN
Qty: 20 TABLET | Refills: 0 | Status: SHIPPED | OUTPATIENT
Start: 2019-01-30 | End: 2020-09-22

## 2019-01-30 RX ORDER — MIDAZOLAM HYDROCHLORIDE 1 MG/ML
INJECTION, SOLUTION INTRAMUSCULAR; INTRAVENOUS
Status: DISCONTINUED | OUTPATIENT
Start: 2019-01-30 | End: 2019-01-30

## 2019-01-30 RX ORDER — SODIUM CHLORIDE 0.9 % (FLUSH) 0.9 %
3 SYRINGE (ML) INJECTION EVERY 8 HOURS
Status: DISCONTINUED | OUTPATIENT
Start: 2019-01-30 | End: 2019-01-30 | Stop reason: HOSPADM

## 2019-01-30 RX ORDER — MORPHINE SULFATE 4 MG/ML
2 INJECTION, SOLUTION INTRAMUSCULAR; INTRAVENOUS EVERY 5 MIN PRN
Status: DISCONTINUED | OUTPATIENT
Start: 2019-01-30 | End: 2019-01-30 | Stop reason: HOSPADM

## 2019-01-30 RX ORDER — LIDOCAINE HYDROCHLORIDE 10 MG/ML
INJECTION INFILTRATION; PERINEURAL
Status: DISCONTINUED | OUTPATIENT
Start: 2019-01-30 | End: 2019-01-30

## 2019-01-30 RX ORDER — FENTANYL CITRATE 50 UG/ML
INJECTION, SOLUTION INTRAMUSCULAR; INTRAVENOUS
Status: DISCONTINUED | OUTPATIENT
Start: 2019-01-30 | End: 2019-01-30

## 2019-01-30 RX ORDER — OXYCODONE HYDROCHLORIDE 5 MG/1
5 TABLET ORAL
Status: DISCONTINUED | OUTPATIENT
Start: 2019-01-30 | End: 2019-01-30 | Stop reason: HOSPADM

## 2019-01-30 RX ORDER — SODIUM CHLORIDE, SODIUM LACTATE, POTASSIUM CHLORIDE, CALCIUM CHLORIDE 600; 310; 30; 20 MG/100ML; MG/100ML; MG/100ML; MG/100ML
INJECTION, SOLUTION INTRAVENOUS CONTINUOUS PRN
Status: DISCONTINUED | OUTPATIENT
Start: 2019-01-30 | End: 2019-01-30

## 2019-01-30 RX ORDER — DEXAMETHASONE SODIUM PHOSPHATE 4 MG/ML
INJECTION, SOLUTION INTRA-ARTICULAR; INTRALESIONAL; INTRAMUSCULAR; INTRAVENOUS; SOFT TISSUE
Status: DISCONTINUED | OUTPATIENT
Start: 2019-01-30 | End: 2019-01-30

## 2019-01-30 RX ADMIN — PANTOPRAZOLE SODIUM 40 MG: 40 TABLET, DELAYED RELEASE ORAL at 09:01

## 2019-01-30 RX ADMIN — CEFAZOLIN 3 G: 1 INJECTION, POWDER, FOR SOLUTION INTRAMUSCULAR; INTRAVENOUS at 12:01

## 2019-01-30 RX ADMIN — PROPOFOL 100 MG: 10 INJECTION, EMULSION INTRAVENOUS at 12:01

## 2019-01-30 RX ADMIN — FENTANYL CITRATE 50 MCG: 50 INJECTION, SOLUTION INTRAMUSCULAR; INTRAVENOUS at 12:01

## 2019-01-30 RX ADMIN — ONDANSETRON 4 MG: 2 INJECTION, SOLUTION INTRAMUSCULAR; INTRAVENOUS at 12:01

## 2019-01-30 RX ADMIN — LIDOCAINE HYDROCHLORIDE 100 MG: 10 INJECTION, SOLUTION INFILTRATION; PERINEURAL at 12:01

## 2019-01-30 RX ADMIN — DEXAMETHASONE SODIUM PHOSPHATE 8 MG: 4 INJECTION, SOLUTION INTRA-ARTICULAR; INTRALESIONAL; INTRAMUSCULAR; INTRAVENOUS; SOFT TISSUE at 12:01

## 2019-01-30 RX ADMIN — MIDAZOLAM 2 MG: 1 INJECTION INTRAMUSCULAR; INTRAVENOUS at 11:01

## 2019-01-30 RX ADMIN — SODIUM CHLORIDE, SODIUM LACTATE, POTASSIUM CHLORIDE, AND CALCIUM CHLORIDE: 600; 310; 30; 20 INJECTION, SOLUTION INTRAVENOUS at 11:01

## 2019-01-30 RX ADMIN — OXYCODONE HYDROCHLORIDE 5 MG: 5 TABLET ORAL at 01:01

## 2019-01-30 RX ADMIN — CANDESARTAN CILEXETIL 16 MG: 16 TABLET ORAL at 09:01

## 2019-01-30 RX ADMIN — HYDROCHLOROTHIAZIDE 12.5 MG: 12.5 TABLET ORAL at 09:01

## 2019-01-30 RX ADMIN — SODIUM CHLORIDE: 0.9 INJECTION, SOLUTION INTRAVENOUS at 12:01

## 2019-01-30 RX ADMIN — ROSUVASTATIN CALCIUM 10 MG: 10 TABLET, FILM COATED ORAL at 09:01

## 2019-01-30 NOTE — ANESTHESIA PREPROCEDURE EVALUATION
01/30/2019  Casie Gaines is a 63 y.o., female.    Pre-op Assessment    I have reviewed the Patient Summary Reports.     I have reviewed the Nursing Notes.   I have reviewed the Medications.     Review of Systems  Anesthesia Hx:  Denies Family Hx of Anesthesia complications.   Denies Personal Hx of Anesthesia complications.   Social:  Non-Smoker    Cardiovascular:   Hypertension CHF ECG has been reviewed.   Sinus rhythm with short MS  Nonspecific ST and T wave abnormality  Abnormal ECG  Hx of chf    Pulmonary:  Pulmonary Normal Plural effusion.  Thoracentesis done   Renal/:   Chronic Renal Disease        Physical Exam  General:  Obesity    Airway/Jaw/Neck:  Airway Findings:      Chest/Lungs:  Chest/Lungs Findings: Normal Respiratory Rate     Heart/Vascular:  Heart Findings: Normal            Anesthesia Plan  Type of Anesthesia, risks & benefits discussed:  Anesthesia Type:  general  Patient's Preference:   Intra-op Monitoring Plan: standard ASA monitors  Intra-op Monitoring Plan Comments:   Post Op Pain Control Plan: multimodal analgesia  Post Op Pain Control Plan Comments:   Induction:    Beta Blocker:  Patient is not currently on a Beta-Blocker (No further documentation required).       Informed Consent:    ASA Score: 3     Day of Surgery Review of History & Physical: I have interviewed and examined the patient. I have reviewed the patient's H&P dated:  There are no significant changes.

## 2019-01-30 NOTE — ASSESSMENT & PLAN NOTE
--planning thoracentesis with cytology    January 28, 2019--Thoracentesis today per IR(860ml removed). Pleural fluid sent for cytology and culture. Awaiting results. Further recommendations pending results. Continue supportive care.    Jan. 30, 2019--No evidence of malignant cells noted on WBC & Diff of pleural fluid. Awaiting cytology of pleural fluid. Awaiting pelvic mass biopsy results. Further recommendations pending results. Continue supportive care.

## 2019-01-30 NOTE — PLAN OF CARE
Problem: Adult Inpatient Plan of Care  Goal: Plan of Care Review  Outcome: Ongoing (interventions implemented as appropriate)  Dx sob, abdominal pain  poc instructed on dbc 10 x q1h wa, instructed on turnign q2h. Instructed on iv site care. Instructed on fall risk and precautions. Instructed on meds, no distress noted. Pt talking on phone most of the day. Denies needs. Water, phone and call light in reach.

## 2019-01-30 NOTE — ASSESSMENT & PLAN NOTE
Overall imaging consistent with metastatic malignancy/peritoneal carcinomatosis.  Planning CT-guided biopsy of 1 of the abdominal masses for tissue diagnosis and provide tissue for molecular studies.    --tumor markers pending  --planningCT-guided biopsy of 1 of the abdominal masses  --supportive care  --patient may be discharged with outpatient follow-up following biopsy    Jan. 28, 2019--Patient is s/p CT guided bx of abdominal mass, awaiting results. Thoracentesis also performed today with 860 ml removed. Pleural fluid sent for cytology and cultures, awaiting results. CEA 65.4,  2740. Likely metastatic ovarian cancer. IVP planned tomorrow per Urology for hydronephrosis. Continue IV abx therapy for UTI, repeat urine cx pending. Continue supportive care.    Jan 29, 2019--Patient is s/p CT guided bx of abdominal mass, awaiting results. S/p thoracentesis. No evidence of malignant cells noted on WBC & Diff of pleural fluid. Awaiting cytology of pleural fluid. Awaiting pelvic mass biopsy results. Further recommendations pending results. IVP per Urology today for hydronephrosis. Continue IV abx therapy for UTI, repeat urine cx pending. Continue supportive care.     Jan 30, 2019--Awaiting pelvic mass biopsy results. Awaiting cytology of pleural fluid. Ureteral stent placement today per Urology. Repeat urine culture neg. Further recommendations regarding potential malignancy pending results. Continue supportive care.

## 2019-01-30 NOTE — ANESTHESIA POSTPROCEDURE EVALUATION
"Anesthesia Post Evaluation    Patient: Casie Gaines    Procedure(s) Performed: Procedure(s) (LRB):  CYSTOSCOPY, WITH URETERAL STENT INSERTION (Right)  PYELOGRAM, RETROGRADE (Right)    Final Anesthesia Type: general  Patient location during evaluation: PACU  Patient participation: Yes- Able to Participate  Level of consciousness: awake and alert and oriented  Post-procedure vital signs: reviewed and stable  Pain management: adequate  Airway patency: patent  PONV status at discharge: No PONV  Anesthetic complications: no      Cardiovascular status: blood pressure returned to baseline  Respiratory status: unassisted  Hydration status: euvolemic  Follow-up not needed.        Visit Vitals  /70 (BP Location: Left arm, Patient Position: Lying)   Pulse 87   Temp 36.7 °C (98 °F) (Temporal)   Resp (!) 23   Ht 5' 7" (1.702 m)   Wt 108.6 kg (239 lb 6.7 oz)   SpO2 98%   Breastfeeding? No   BMI 37.50 kg/m²       Pain/Kym Score: No Data Recorded      "

## 2019-01-30 NOTE — SUBJECTIVE & OBJECTIVE
Interval History: IVP today. No evidence of malignant cells noted on WBC & Diff Body fluid obtained from thoracentesis fluid. Awaiting pleural fluid cytology path report. Awaiting abdominal mass biopsy results    Jan 30, 2019--s/p IVP. Planned stent placement today per Urology. Awaiting pleural fluid cytology path report. Awaiting abdominal mass biopsy results    Oncology Treatment Plan:   [No treatment plan]    Medications:  Continuous Infusions:   sodium chloride 0.9% 75 mL/hr at 01/30/19 0043     Scheduled Meds:   candesartan  16 mg Oral BID    And    hydroCHLOROthiazide  12.5 mg Oral Daily    cefTRIAXone (ROCEPHIN) IVPB  2 g Intravenous Q24H    pantoprazole  40 mg Oral Daily    rosuvastatin  10 mg Oral Daily     PRN Meds:acetaminophen, albuterol-ipratropium, aluminum-magnesium hydroxide-simethicone, cloNIDine, diphenhydrAMINE, guaifenesin 100 mg/5 ml, hydrALAZINE, omnipaque, ondansetron, oxyCODONE-acetaminophen, oxyCODONE-acetaminophen     Review of Systems   Constitutional: Positive for fatigue. Negative for activity change, appetite change, chills, diaphoresis, fever and unexpected weight change.   HENT: Negative for congestion, mouth sores, nosebleeds, sore throat, trouble swallowing and voice change.    Eyes: Negative for photophobia and visual disturbance.   Respiratory: Positive for shortness of breath (on exertion). Negative for cough, chest tightness and wheezing.    Cardiovascular: Negative for chest pain, palpitations and leg swelling.   Gastrointestinal: Positive for abdominal pain. Negative for abdominal distention, anal bleeding, blood in stool, constipation, diarrhea, nausea, rectal pain and vomiting.   Genitourinary: Negative for difficulty urinating, dysuria and hematuria.   Musculoskeletal: Negative for arthralgias, back pain and myalgias.   Skin: Negative for pallor, rash and wound.   Neurological: Negative for dizziness, syncope, weakness, light-headedness and headaches.   Hematological:  Negative for adenopathy. Does not bruise/bleed easily.   Psychiatric/Behavioral: The patient is nervous/anxious.      Objective:     Vital Signs (Most Recent):  Temp: 98.3 °F (36.8 °C) (01/30/19 0743)  Pulse: 88 (01/30/19 0743)  Resp: 20 (01/30/19 0743)  BP: 129/76 (01/30/19 0743)  SpO2: 95 % (01/30/19 0743) Vital Signs (24h Range):  Temp:  [98.3 °F (36.8 °C)-98.5 °F (36.9 °C)] 98.3 °F (36.8 °C)  Pulse:  [80-91] 88  Resp:  [18-20] 20  SpO2:  [94 %-96 %] 95 %  BP: (123-135)/(57-76) 129/76     Weight: 108.6 kg (239 lb 6.7 oz)  Body mass index is 37.5 kg/m².  Body surface area is 2.27 meters squared.      Intake/Output Summary (Last 24 hours) at 1/30/2019 1122  Last data filed at 1/30/2019 0600  Gross per 24 hour   Intake 2466 ml   Output 200 ml   Net 2266 ml       Physical Exam   Constitutional: She is oriented to person, place, and time. She appears well-developed and well-nourished. She is cooperative.   HENT:   Head: Normocephalic.   Right Ear: Hearing normal. No drainage.   Left Ear: Hearing normal. No drainage.   Nose: Nose normal.   Mouth/Throat: Oropharynx is clear and moist.   Eyes: Conjunctivae, EOM and lids are normal. Right eye exhibits no discharge. Left eye exhibits no discharge. No scleral icterus.   Neck: Normal range of motion. No thyroid mass present.   Cardiovascular: Normal rate, regular rhythm and normal heart sounds.   No murmur heard.  Pulmonary/Chest: Effort normal and breath sounds normal. No respiratory distress. She has no wheezes. She has no rhonchi. She has no rales.   Abdominal: Soft. Bowel sounds are normal. She exhibits no distension. There is tenderness.   Genitourinary:   Genitourinary Comments: deferred   Musculoskeletal: Normal range of motion. She exhibits no edema.   Lymphadenopathy:        Head (right side): No submandibular, no preauricular and no posterior auricular adenopathy present.        Head (left side): No submandibular, no preauricular and no posterior auricular  adenopathy present.        Right cervical: No superficial cervical adenopathy present.       Left cervical: No superficial cervical adenopathy present.   Neurological: She is alert and oriented to person, place, and time.   Skin: Skin is warm, dry and intact.   Psychiatric: She has a normal mood and affect. Her speech is normal and behavior is normal. Thought content normal.   Vitals reviewed.      Significant Labs:   BMP:   Recent Labs   Lab 01/29/19 0935 01/30/19  0456    99    141   K 3.9 3.9    108   CO2 23 26   BUN 9 10   CREATININE 0.7 0.7   CALCIUM 9.3 9.7   , CBC:   Recent Labs   Lab 01/29/19 0935 01/30/19  0456   WBC 9.16 9.22   HGB 11.7* 11.8*   HCT 36.7* 37.5    338   , CMP:   Recent Labs   Lab 01/28/19 1957 01/29/19 0935 01/30/19 0456   NA  --  140 141   K  --  3.9 3.9   CL  --  106 108   CO2  --  23 26   GLU  --  101 99   BUN  --  9 10   CREATININE  --  0.7 0.7   CALCIUM  --  9.3 9.7   PROT 6.9 7.0 6.8   ALBUMIN  --  3.3* 3.1*   BILITOT  --  0.5 0.3   ALKPHOS  --  122 112   AST  --  33 31   ALT  --  33 30   ANIONGAP  --  11 7*   EGFRNONAA  --  >60 >60   , LFTs:   Recent Labs   Lab 01/28/19 1957 01/29/19  0935 01/30/19  0456   ALT  --  33 30   AST  --  33 31   ALKPHOS  --  122 112   BILITOT  --  0.5 0.3   PROT 6.9 7.0 6.8   ALBUMIN  --  3.3* 3.1*   , Tumor Markers: No results for input(s): PSA, CEA, , AFPTM, [id number],  in the last 48 hours.    Invalid input(s): ALGTM, Urine Studies: No results for input(s): COLORU, APPEARANCEUA, PHUR, SPECGRAV, PROTEINUA, GLUCUA, KETONESU, BILIRUBINUA, OCCULTUA, NITRITE, UROBILINOGEN, LEUKOCYTESUR, RBCUA, WBCUA, BACTERIA, SQUAMEPITHEL, HYALINECASTS in the last 48 hours.    Invalid input(s): WRIGHTSUR and All pertinent labs from the last 24 hours have been reviewed.    Diagnostic Results:  I have reviewed all pertinent imaging results/findings within the past 24 hours.

## 2019-01-30 NOTE — PROGRESS NOTES
[facility] -   Hematology/Oncology  Progress Note    Patient Name: Casie Gaines  Admission Date: 1/25/2019  Hospital Length of Stay: 2 days  Code Status: Full Code     Subjective:     HPI:    63-year-old female with a history of CHF, dyslipidemia, hypertension who presented to the emergency room with progressive shortness of breath over the previous month.  She also reports significant abdominal discomfort with slight distention.  01/25/2019 CT scan demonstrated numerous soft tissue masses within the left a abdominal cavity consistent with peritoneal carcinomatosis.  The patient has no personal history of malignancy and underwent hysterectomy in 2006 for fibroid uterus.  Imaging also demonstrated large right pleural effusion for which thoracentesis is planned.    Patient has a family history of colon cancer in her brother at age 62 and lung cancer in her brother at age 64  No history of breast or ovarian cancer    Interval History: IVP today. No evidence of malignant cells noted on WBC & Diff Body fluid obtained from thoracentesis fluid. Awaiting pleural fluid cytology path report. Awaiting abdominal mass biopsy results    Jan 30, 2019--s/p IVP. Planned stent placement today per Urology. Awaiting pleural fluid cytology path report. Awaiting abdominal mass biopsy results    Oncology Treatment Plan:   [No treatment plan]    Medications:  Continuous Infusions:   sodium chloride 0.9% 75 mL/hr at 01/30/19 0043     Scheduled Meds:   candesartan  16 mg Oral BID    And    hydroCHLOROthiazide  12.5 mg Oral Daily    cefTRIAXone (ROCEPHIN) IVPB  2 g Intravenous Q24H    pantoprazole  40 mg Oral Daily    rosuvastatin  10 mg Oral Daily     PRN Meds:acetaminophen, albuterol-ipratropium, aluminum-magnesium hydroxide-simethicone, cloNIDine, diphenhydrAMINE, guaifenesin 100 mg/5 ml, hydrALAZINE, omnipaque, ondansetron, oxyCODONE-acetaminophen, oxyCODONE-acetaminophen     Review of Systems   Constitutional:  Positive for fatigue. Negative for activity change, appetite change, chills, diaphoresis, fever and unexpected weight change.   HENT: Negative for congestion, mouth sores, nosebleeds, sore throat, trouble swallowing and voice change.    Eyes: Negative for photophobia and visual disturbance.   Respiratory: Positive for shortness of breath (on exertion). Negative for cough, chest tightness and wheezing.    Cardiovascular: Negative for chest pain, palpitations and leg swelling.   Gastrointestinal: Positive for abdominal pain. Negative for abdominal distention, anal bleeding, blood in stool, constipation, diarrhea, nausea, rectal pain and vomiting.   Genitourinary: Negative for difficulty urinating, dysuria and hematuria.   Musculoskeletal: Negative for arthralgias, back pain and myalgias.   Skin: Negative for pallor, rash and wound.   Neurological: Negative for dizziness, syncope, weakness, light-headedness and headaches.   Hematological: Negative for adenopathy. Does not bruise/bleed easily.   Psychiatric/Behavioral: The patient is nervous/anxious.      Objective:     Vital Signs (Most Recent):  Temp: 98.3 °F (36.8 °C) (01/30/19 0743)  Pulse: 88 (01/30/19 0743)  Resp: 20 (01/30/19 0743)  BP: 129/76 (01/30/19 0743)  SpO2: 95 % (01/30/19 0743) Vital Signs (24h Range):  Temp:  [98.3 °F (36.8 °C)-98.5 °F (36.9 °C)] 98.3 °F (36.8 °C)  Pulse:  [80-91] 88  Resp:  [18-20] 20  SpO2:  [94 %-96 %] 95 %  BP: (123-135)/(57-76) 129/76     Weight: 108.6 kg (239 lb 6.7 oz)  Body mass index is 37.5 kg/m².  Body surface area is 2.27 meters squared.      Intake/Output Summary (Last 24 hours) at 1/30/2019 1122  Last data filed at 1/30/2019 0600  Gross per 24 hour   Intake 2466 ml   Output 200 ml   Net 2266 ml       Physical Exam   Constitutional: She is oriented to person, place, and time. She appears well-developed and well-nourished. She is cooperative.   HENT:   Head: Normocephalic.   Right Ear: Hearing normal. No drainage.   Left  Ear: Hearing normal. No drainage.   Nose: Nose normal.   Mouth/Throat: Oropharynx is clear and moist.   Eyes: Conjunctivae, EOM and lids are normal. Right eye exhibits no discharge. Left eye exhibits no discharge. No scleral icterus.   Neck: Normal range of motion. No thyroid mass present.   Cardiovascular: Normal rate, regular rhythm and normal heart sounds.   No murmur heard.  Pulmonary/Chest: Effort normal and breath sounds normal. No respiratory distress. She has no wheezes. She has no rhonchi. She has no rales.   Abdominal: Soft. Bowel sounds are normal. She exhibits no distension. There is tenderness.   Genitourinary:   Genitourinary Comments: deferred   Musculoskeletal: Normal range of motion. She exhibits no edema.   Lymphadenopathy:        Head (right side): No submandibular, no preauricular and no posterior auricular adenopathy present.        Head (left side): No submandibular, no preauricular and no posterior auricular adenopathy present.        Right cervical: No superficial cervical adenopathy present.       Left cervical: No superficial cervical adenopathy present.   Neurological: She is alert and oriented to person, place, and time.   Skin: Skin is warm, dry and intact.   Psychiatric: She has a normal mood and affect. Her speech is normal and behavior is normal. Thought content normal.   Vitals reviewed.      Significant Labs:   BMP:   Recent Labs   Lab 01/29/19  0935 01/30/19  0456    99    141   K 3.9 3.9    108   CO2 23 26   BUN 9 10   CREATININE 0.7 0.7   CALCIUM 9.3 9.7   , CBC:   Recent Labs   Lab 01/29/19  0935 01/30/19  0456   WBC 9.16 9.22   HGB 11.7* 11.8*   HCT 36.7* 37.5    338   , CMP:   Recent Labs   Lab 01/28/19 1957 01/29/19  0935 01/30/19  0456   NA  --  140 141   K  --  3.9 3.9   CL  --  106 108   CO2  --  23 26   GLU  --  101 99   BUN  --  9 10   CREATININE  --  0.7 0.7   CALCIUM  --  9.3 9.7   PROT 6.9 7.0 6.8   ALBUMIN  --  3.3* 3.1*   BILITOT  --  0.5  0.3   ALKPHOS  --  122 112   AST  --  33 31   ALT  --  33 30   ANIONGAP  --  11 7*   EGFRNONAA  --  >60 >60   , LFTs:   Recent Labs   Lab 01/28/19  1957 01/29/19  0935 01/30/19  0456   ALT  --  33 30   AST  --  33 31   ALKPHOS  --  122 112   BILITOT  --  0.5 0.3   PROT 6.9 7.0 6.8   ALBUMIN  --  3.3* 3.1*   , Tumor Markers: No results for input(s): PSA, CEA, , AFPTM, [id number],  in the last 48 hours.    Invalid input(s): ALGTM, Urine Studies: No results for input(s): COLORU, APPEARANCEUA, PHUR, SPECGRAV, PROTEINUA, GLUCUA, KETONESU, BILIRUBINUA, OCCULTUA, NITRITE, UROBILINOGEN, LEUKOCYTESUR, RBCUA, WBCUA, BACTERIA, SQUAMEPITHEL, HYALINECASTS in the last 48 hours.    Invalid input(s): WRIGHTSUR and All pertinent labs from the last 24 hours have been reviewed.    Diagnostic Results:  I have reviewed all pertinent imaging results/findings within the past 24 hours.    Assessment/Plan:     * Peritoneal carcinomatosis    Overall imaging consistent with metastatic malignancy/peritoneal carcinomatosis.  Planning CT-guided biopsy of 1 of the abdominal masses for tissue diagnosis and provide tissue for molecular studies.    --tumor markers pending  --planningCT-guided biopsy of 1 of the abdominal masses  --supportive care  --patient may be discharged with outpatient follow-up following biopsy    Jan. 28, 2019--Patient is s/p CT guided bx of abdominal mass, awaiting results. Thoracentesis also performed today with 860 ml removed. Pleural fluid sent for cytology and cultures, awaiting results. CEA 65.4,  2740. Likely metastatic ovarian cancer. IVP planned tomorrow per Urology for hydronephrosis. Continue IV abx therapy for UTI, repeat urine cx pending. Continue supportive care.    Jan 29, 2019--Patient is s/p CT guided bx of abdominal mass, awaiting results. S/p thoracentesis. No evidence of malignant cells noted on WBC & Diff of pleural fluid. Awaiting cytology of pleural fluid. Awaiting pelvic mass biopsy  results. Further recommendations pending results. IVP per Urology today for hydronephrosis. Continue IV abx therapy for UTI, repeat urine cx pending. Continue supportive care.     Jan 30, 2019--Awaiting pelvic mass biopsy results. Awaiting cytology of pleural fluid. Ureteral stent placement today per Urology. Repeat urine culture neg. Further recommendations regarding potential malignancy pending results. Continue supportive care.        Pleural effusion, right    --planning thoracentesis with cytology    January 28, 2019--Thoracentesis today per IR(860ml removed). Pleural fluid sent for cytology and culture. Awaiting results. Further recommendations pending results. Continue supportive care.    Jan. 30, 2019--No evidence of malignant cells noted on WBC & Diff of pleural fluid. Awaiting cytology of pleural fluid. Awaiting pelvic mass biopsy results. Further recommendations pending results. Continue supportive care.               Thank you for your consult. I will follow-up with patient. Please contact us if you have any additional questions.     Jennifer Pineda NP  Hematology/Oncology  [facility] [place]

## 2019-01-30 NOTE — PLAN OF CARE
Ambulated well in room without difficulty. Report received from floor nurse katie. Escorted to preop via stretcher on tele monitor with daughter in law at bedside. Monitor shows nsr. preop completed without incident.

## 2019-01-30 NOTE — OP NOTE
Date of Procedure: 01/30/2019    Pre-operative Diagnosis:   1.  Right ureteral obstruction    Post-operative Diagnosis:   1.  same    Surgeon: EZEKIEL Santiago MD    Assistants: None    Specimen: None    Prosthetics, Devices, Grafts: None    Anesthesia: MAC    Procedures:  1. Cysto with placement of right ureteral stent  2. Right retrograde pyelogram  3. Fluoro less than one hour    Procedure in Detail:  After proper consents were obtained, the patient was prepped and draped in normal sterile fashion in the dorsal lithotomy position.  The 22 Fr resectascope was assembled and introduced per urethra.  The bladder was inspected.    A guide wire was passed to the right renal pelvis but seemed low.  Right retrograde pyelogram was performed with half-dilute water soluble contrast and the wire was below the UPJ in the tortuous ureter.  The renal pelvis was reached with an angled glide wire and then catheter advanced to permit placement of an amplatz wire.  A new stent was then passed over the wire and when the wire was withdrawn fluoroscopy confirmed good placement with a curl in the stent on both ends.  The cystoscope was then reinserted to the bladder which was drained and the scope was then withdrawn and set aside.    Blood Loss: none    Fluids: Per anesthesia.    Drains: 7x24cm right ureteral stent    Complications: None.

## 2019-01-30 NOTE — DISCHARGE SUMMARY
[facility] Hospital Medicine  Discharge Summary      Patient Name: Casie Gaines  MRN: [id number]  Admission Date: 1/25/2019  Hospital Length of Stay: 2 days  Discharge Date and Time: No discharge date for patient encounter.  Attending Physician: Timmy Carr MD   Discharging Provider: Timmy Carr MD  Primary Care Provider: Rossana Ang MD      HPI:   Ms. Gaines is a 63-year-old  female with PMH significant for HTN, hyperlipidemia, presents to [facility] ED complaining of progressively worsening shortness of breath for the past 3-4 weeks, associated with left abdominal discomfort.  She reports chills but no fever.  Complains of shortness of breath without cough, congestion.  Denies hemoptysis or recent weight loss.  In the ED chest x-ray revealed large right sided pleural effusion.  CT abdomen revealed numerous soft tissue masses within the left kimani abdomen, suggestive of peritoneal carcinomatosis.  Patient has no known history of malignancy.  Unknown primary.  In addition patient has urinary tract infection with many bacteria, greater than 100 WBC, positive nitrites and leukocyte esterases.  She was started on IV Rocephin in the ED.  Admitting diagnosis large pleural effusion of unknown etiology, and new diagnosis of peritoneal carcinomatosis.  Oncology and Pulmonary consultations placed.    Procedure(s) (LRB):  CYSTOSCOPY, WITH URETERAL STENT INSERTION (Right)  PYELOGRAM, RETROGRADE (Right)      Hospital Course:   Pt admitted for Large right-sided pleural effusion of unknown etiology and new finding of peritoneal carcinomatosis in the abdomen on CT abd. CT abd also showed moderate right sided hydronephrosis. Pulm consulted for thoracentesis but unsuccessful after 3 attempts. IR consulted for CT-guided biopsy of abdominal masses for tissue diagnosis and molecular studies. IR also consulted for thoracentesis and CT guided biopsy.     Rocephin started for  UTI. Urine culture shows gram negative kimberli.     Heme/Onc consulted. Tumor markers showed  is 2740, CEA 65.4.     Discussed right sided hydronephrosis with Urology who recommended IVP- IVP can not bee done until tomorrow due to contrast injection today     1/29  Patients/p IVP. Plan for stent placement from Urology tomorrow. Path pending. Patient appropriate and conversant.    Pt admitted for Large right-sided pleural effusion of unknown etiology and new finding of peritoneal carcinomatosis in the abdomen on CT abd. CT abd also showed moderate right sided hydronephrosis. Pulm consulted for thoracentesis but unsuccessful after 3 attempts. IR consulted for CT-guided biopsy of abdominal masses for tissue diagnosis and molecular studies. IR also consulted for thoracentesis and CT guided biopsy. Performed without event Path pending. Patient with a hydronephrosis secondary to abdominal process. Urology evaluated and successfully placed stent. Urology requesting follow up in 4 weeks. Patient seen and examined today prior to her discharge and deemed stable for a safe discharge to home with close follow up arranged.     Consults:   Consults (From admission, onward)        Status Ordering Provider     Inpatient consult to Hematology/Oncology  Once     Provider:  Ankit Santos MD    Acknowledged CHANTEL DICKINSON     Inpatient consult to Interventional Radiology  Once     Provider:  Chandrakant Fermin MD    Completed JOVANNY MUNOZ     Inpatient consult to Pulmonology  Once     Provider:  Arnaldo Mathur MD    Completed CHANTEL DICKINSON     Inpatient consult to Urology  Once     Provider:  Timmy Santiago IV, MD    Completed GERARDO LEDESMA          No new Assessment & Plan notes have been filed under this hospital service since the last note was generated.  Service: Hospital Medicine    Final Active Diagnoses:    Diagnosis Date Noted POA    Pelvic mass [R19.00] 01/28/2019 Yes    Peritoneal carcinomatosis  [C78.6, C80.1] 01/26/2019 Yes    Morbid obesity [E66.01] 01/26/2019 Yes    Hyperlipidemia [E78.5] 02/01/2018 Yes    Hypertension [I10] 06/12/2013 Yes      Problems Resolved During this Admission:    Diagnosis Date Noted Date Resolved POA    PRINCIPAL PROBLEM:  Other hydronephrosis [N13.39] 01/28/2019 01/30/2019 Yes    Ureteral obstruction [N13.5] 01/30/2019 01/30/2019 Yes    Pleural effusion, right [J90] 01/26/2019 01/30/2019 Yes    UTI (urinary tract infection) [N39.0] 01/26/2019 01/30/2019 Yes    H/O: hysterectomy [Z90.710] 02/05/2014 01/30/2019 Yes       Discharged Condition: stable    Disposition: Home or Self Care    Follow Up:  Follow-up Information     Rossana Ang MD.    Specialty:  Family Medicine  Contact information:  [address] [facility]  [place] [address]  [phone]             Will Trevizo Jr, MD In 1 week.    Specialty:  Hematology and Oncology  Why:  Pathology results   Contact information:  [address]  [place] [address]  [phone]             Timmy Santiago IV, MD In 4 weeks.    Specialty:  Urology  Contact information:  [address]  [place] [id number] [phone]                 Patient Instructions:      Diet Cardiac     Activity as tolerated       Significant Diagnostic Studies: Labs:   BMP:   Recent Labs   Lab 01/29/19  0935 01/30/19  0456    99    141   K 3.9 3.9    108   CO2 23 26   BUN 9 10   CREATININE 0.7 0.7   CALCIUM 9.3 9.7   , CMP   Recent Labs   Lab 01/28/19 1957 01/29/19  0935 01/30/19  0456   NA  --  140 141   K  --  3.9 3.9   CL  --  106 108   CO2  --  23 26   GLU  --  101 99   BUN  --  9 10   CREATININE  --  0.7 0.7   CALCIUM  --  9.3 9.7   PROT 6.9 7.0 6.8   ALBUMIN  --  3.3* 3.1*   BILITOT  --  0.5 0.3   ALKPHOS  --  122 112   AST  --  33 31   ALT  --  33 30   ANIONGAP  --  11 7*   ESTGFRAFRICA  --  >60 >60   EGFRNONAA  --  >60 >60   , CBC   Recent Labs   Lab 01/29/19  0935 01/30/19  0456   WBC 9.16 9.22    HGB 11.7* 11.8*   HCT 36.7* 37.5    338    and All labs within the past 24 hours have been reviewed    Pending Diagnostic Studies:     Procedure Component Value Units Date/Time    CT Guided Needle Placement [[id number]]     Order Status:  Sent Lab Status:  No result     Cytology Specimen-Medical Cytology (Fluid/Wash/Brush) [[id number]] Collected:  01/28/19 1253    Order Status:  Sent Lab Status:  In process Updated:  01/28/19 1458    Specimen:  Other specimen location (comment)     Pathology Tissue Specimen To Pathology, Radiology Biopsy [[id number]] Collected:  01/28/19 1316    Order Status:  Sent Lab Status:  In process Updated:  01/28/19 1357    Specimen:  Biopsy from Other, please specify          Medications:  Reconciled Home Medications:      Medication List      START taking these medications    oxyCODONE-acetaminophen  mg per tablet  Commonly known as:  PERCOCET  Take 1 tablet by mouth every 8 (eight) hours as needed.        CONTINUE taking these medications    albuterol 90 mcg/actuation inhaler  Commonly known as:  PROVENTIL/VENTOLIN HFA  Inhale 2 puffs into the lungs every 4 (four) hours as needed for Wheezing. Rescue     multivitamin with minerals tablet  Take by mouth.     olmesartan-hydrochlorothiazide 40-12.5 mg Tab  Commonly known as:  BENICAR HCT  Take 1 tablet by mouth once daily.     rosuvastatin 10 MG tablet  Commonly known as:  CRESTOR  Take 1 tablet (10 mg total) by mouth once daily.     zinc gluconate 50 mg tablet  Take 50 mg by mouth once daily.            Indwelling Lines/Drains at time of discharge:   Lines/Drains/Airways          None          Time spent on the discharge of patient: 35 minutes  Patient was seen and examined on the date of discharge and determined to be suitable for discharge.         Timmy Carr MD  Department of Hospital Medicine  [facility]

## 2019-01-30 NOTE — TRANSFER OF CARE
"Anesthesia Transfer of Care Note    Patient: Casie Gaines    Procedure(s) Performed: Procedure(s) (LRB):  CYSTOSCOPY, WITH URETERAL STENT INSERTION (Right)  PYELOGRAM, RETROGRADE (Right)    Patient location: PACU    Anesthesia Type: general    Transport from OR: Transported from OR on room air with adequate spontaneous ventilation    Post pain: adequate analgesia    Post assessment: no apparent anesthetic complications    Post vital signs: stable    Level of consciousness: awake, alert and oriented    Nausea/Vomiting: no nausea/vomiting    Complications: none    Transfer of care protocol was followed      Last vitals:   Visit Vitals  /70 (BP Location: Left arm, Patient Position: Lying)   Pulse 87   Temp 36.7 °C (98 °F) (Temporal)   Resp (!) 23   Ht 5' 7" (1.702 m)   Wt 108.6 kg (239 lb 6.7 oz)   SpO2 98%   Breastfeeding? No   BMI 37.50 kg/m²     "

## 2019-01-31 DIAGNOSIS — C78.6 PERITONEAL CARCINOMATOSIS: ICD-10-CM

## 2019-01-31 DIAGNOSIS — J90 PLEURAL EFFUSION ON RIGHT: Primary | ICD-10-CM

## 2019-01-31 LAB
ALBUMIN FLD-MCNC: 3 G/DL
AMYLASE, BODY FLUID: 47 U/L
BACTERIA BLD CULT: NORMAL
BACTERIA BLD CULT: NORMAL
BODY FLUID SOURCE AMYLASE: NORMAL
BODY FLUID SOURCE, LDH: NORMAL
GLUCOSE FLD-MCNC: 81 MG/DL
LDH FLD L TO P-CCNC: 1197 U/L
PROT FLD-MCNC: 5 G/DL
SPECIMEN SOURCE: NORMAL

## 2019-01-31 NOTE — NURSING
Patient wheeled to front of hospital via wheelchair for awaiting ride. IV site d/c'd  Cath tip intact.

## 2019-01-31 NOTE — PLAN OF CARE
01/31/19 0919   Final Note   Assessment Type Final Discharge Note   Anticipated Discharge Disposition Home   Right Care Referral Info   Post Acute Recommendation No Care

## 2019-02-01 NOTE — PHYSICIAN QUERY
PT Name: Casie Gaines  MR #: [id number]    Physician Query Form - Cause and Effect Relationship Clarification      CDS: Stefania Kumar RN   Contact information:[email or web address]    This form is a permanent document in the medical record.     Query Date: February 1, 2019    By submitting this query, we are merely seeking further clarification of documentation. Please utilize your independent clinical judgment when addressing the question(s) below.    The Medical record contains the following:  Supporting Clinical Findings   Location in record   [facility]                                                                                     Urine Culture 1/25   UTI (urinary tract infection)  Cont Rocephin 1 g IV daily.  Blood cultures show NGTD  Urine culture shows gram neg kimberli- f/u on ID and sensitivities   1/29  Follow Cultures  ABX  Urology on board    HM PN 1/29         Provider, please clarify if there is any correlation between KLEBSIELLA PNEUMONIAE and UTI.           Are the conditions:      [x  ] Due to or associated with each other   [  ] Unrelated to each other   [  ] Other (Please Specify): _________________________   [  ] Clinically Undetermined

## 2019-02-02 ENCOUNTER — NURSE TRIAGE (OUTPATIENT)
Dept: ADMINISTRATIVE | Facility: CLINIC | Age: 64
End: 2019-02-02

## 2019-02-02 LAB
BACTERIA FLD AEROBE CULT: NO GROWTH
GRAM STN SPEC: NORMAL
GRAM STN SPEC: NORMAL

## 2019-02-03 NOTE — TELEPHONE ENCOUNTER
Had stent placed  For right utero obstruction on 1/30 When she get urge-to pee.  Will have excruciating pain  For about 1 min. -  Will ease up after she starts  Urinating. Pain will ease and  Then go away. Has not improved since been home.    Did not have to urinate in the hospital.  Pain started when she got home  And tried to urinate.  Thought it was normal and would get better ,  But has now been 2 days  With no improvement.   Does not know if this is to be expected with stent placement.   advised will call on call provider to get further recommendation.          attempted to reach on call provider-  No provider list on call-   to page/ secure chat anyway.  Will await call back.     spoke with dr. Santiago and states this is overactive bladder due to the stent.  This can be normal and get better with time.  If not improving by Monday can call the office.  Called pt and advised as above.        Reason for Disposition   Caller has URGENT question and triager unable to answer question    Protocols used: ST POST-OP SYMPTOMS AND [id number]

## 2019-02-04 ENCOUNTER — TELEPHONE (OUTPATIENT)
Dept: UROLOGY | Facility: CLINIC | Age: 64
End: 2019-02-04

## 2019-02-04 LAB
CHOLEST FLD-MCNC: 84 MG/DL
SPECIMEN SOURCE: NORMAL

## 2019-02-07 ENCOUNTER — OFFICE VISIT (OUTPATIENT)
Dept: INTERNAL MEDICINE | Facility: CLINIC | Age: 64
End: 2019-02-07
Payer: COMMERCIAL

## 2019-02-07 VITALS
HEIGHT: 67 IN | WEIGHT: 233.44 LBS | HEART RATE: 89 BPM | DIASTOLIC BLOOD PRESSURE: 82 MMHG | OXYGEN SATURATION: 98 % | TEMPERATURE: 96 F | BODY MASS INDEX: 36.64 KG/M2 | SYSTOLIC BLOOD PRESSURE: 130 MMHG

## 2019-02-07 DIAGNOSIS — M79.673 HEEL PAIN, UNSPECIFIED LATERALITY: ICD-10-CM

## 2019-02-07 DIAGNOSIS — I10 ESSENTIAL HYPERTENSION: ICD-10-CM

## 2019-02-07 DIAGNOSIS — R19.00 PELVIC MASS: Primary | ICD-10-CM

## 2019-02-07 DIAGNOSIS — J90 PLEURAL EFFUSION ON RIGHT: ICD-10-CM

## 2019-02-07 PROCEDURE — 99214 OFFICE O/P EST MOD 30 MIN: CPT | Mod: S$GLB,,, | Performed by: FAMILY MEDICINE

## 2019-02-07 PROCEDURE — 99214 PR OFFICE/OUTPT VISIT, EST, LEVL IV, 30-39 MIN: ICD-10-PCS | Mod: S$GLB,,, | Performed by: FAMILY MEDICINE

## 2019-02-07 PROCEDURE — 99999 PR PBB SHADOW E&M-EST. PATIENT-LVL III: CPT | Mod: PBBFAC,,, | Performed by: FAMILY MEDICINE

## 2019-02-07 PROCEDURE — 99999 PR PBB SHADOW E&M-EST. PATIENT-LVL III: ICD-10-PCS | Mod: PBBFAC,,, | Performed by: FAMILY MEDICINE

## 2019-02-07 RX ORDER — OLMESARTAN MEDOXOMIL AND HYDROCHLOROTHIAZIDE 40/12.5 40; 12.5 MG/1; MG/1
1 TABLET ORAL DAILY
Qty: 90 TABLET | Refills: 1 | Status: SHIPPED | OUTPATIENT
Start: 2019-02-07 | End: 2019-05-28

## 2019-02-07 RX ORDER — OLMESARTAN MEDOXOMIL AND HYDROCHLOROTHIAZIDE 40/12.5 40; 12.5 MG/1; MG/1
1 TABLET ORAL DAILY
Qty: 30 TABLET | Refills: 1 | Status: SHIPPED | OUTPATIENT
Start: 2019-02-07 | End: 2019-02-07 | Stop reason: SDUPTHER

## 2019-02-07 RX ORDER — OLMESARTAN MEDOXOMIL AND HYDROCHLOROTHIAZIDE 40/12.5 40; 12.5 MG/1; MG/1
1 TABLET ORAL DAILY
Qty: 90 TABLET | Refills: 1 | Status: CANCELLED | OUTPATIENT
Start: 2019-02-07

## 2019-02-07 NOTE — PHYSICIAN QUERY
PT Name: Casie Gaines  MR #: [id number]    Physician Query Form - Pathology Findings Clarification     CDS: Stefania Kumar RN    Contact information:[email or web address]    This form is a permanent document in the medical record.     Query Date: February 7, 2019      By submitting this query, we are merely seeking further clarification of documentation.  Please utilize your independent clinical judgment when addressing the question(s) below.      The medical record contains the following:     Findings Supporting Clinical Information Location in Medical Record   Carcinoma; ovarian primary   1. Left abdominal mass, biopsy:  - Positive for carcinoma, see comment  Comment: The biopsy consists of clusters of malignant cells with large pleomorphic and hyperchromatic nuclei,  abundant pink cytoplasm and associated a pop ptotic debris. Immunohistochemical stains are performed and show  tumor cells positivity for WT-1 and p53 with negativity for CEA and synaptophysin. The histologic appearance and  immunoprofile are most consistent with an ovarian primary. Clinical and radiographic correlation is necessary.   Surgical Pathology 1/28     Please document the clinical significance of the Pathologists findings of Carcinoma; ovarian primary.    [ x  ] I agree with the Pathology Findings   [   ] I do not agree with the Pathology Findings   [   ] Other/Clarification of Findings:   [   ] Clinically Insignificant   [  ] Clinically Undetermined       Please document in your progress notes daily for the duration of treatment until resolved and include in your discharge summary.

## 2019-02-07 NOTE — PROGRESS NOTES
Casie Frias Givens  02/08/2019  [id number]    Rossana Ang MD  Patient Care Team:  Rossana Ang MD as PCP - General (Family Medicine)  Radha Foley LPN as Care Coordinator (Internal Medicine)  Has the patient seen any provider outside of the [facility] network since the last visit? (no). If yes, HIPPA forms completed and records requested.        Visit Type:TCC  Transitional Care Note    Family and/or Caretaker present at visit?  No.  Diagnostic tests reviewed/disposition: No diagnosic tests pending after this hospitalization.  Disease/illness education: Oncology  Home health/community services discussion/referrals: Patient does not have home health established from hospital visit.  They do not need home health.  If needed, we will set up home health for the patient.   Establishment or re-establishment of referral orders for community resources: No other necessary community resources.   Discussion with other health care providers: Awaiting Path report with Oncology.         Chief Complaint:  Chief Complaint   Patient presents with    Follow-up     hospital f/u. she is having pain from the stint. foot pain on the left foot    Dizziness     they were out of her medication yesterday so she didn't take her bp meds today. she isn't sure if that is because of her not taking bp meds    Medication Refill     for the benicar. she said we can send it to our pharmacy       History of Present Illness:  63 year old here for hospital follow up.    She was admitted for feeling bad, cough. SOB. Found to have large plural effusion. First thought infection, but after a CT scan was done, found to have mass in abdomen.    Thoracentesis complete and sent to path. She has appt with Oncology tomorrow for results. Suspect and I discussed ovarian primary.      She did get a stent placed in kidney. Reviewed scan and there was hydronephrosis on the right side. Mass was pressing on ureter. She now is having major pain and spasm  from the stent. She was tx for UTI. Urology is Dr. Santiago. She has appt with him in about 1.5 weeks.     She needs refills on her BP medication.  She doesn't complain of pain or SOB. Reports better after thoracentesis. Mild fatigue.    She does have heel pain. Wearing an ankle brace, but doesn't seen to help. She reports that pain worse in heel in am.Worse on left.     She reports that she had a stent placed for her renal.        History:  Past Medical History:   Diagnosis Date    Anemia     Anxiety     Arthritis     knees    CHF (congestive heart failure)     Hyperlipemia     Hypertension     Neck pain      Past Surgical History:   Procedure Laterality Date    breast reduction  10/02/2018     SECTION      X 1    CYSTOSCOPY, WITH URETERAL STENT INSERTION Right 2019    [facility]    DILATION AND CURETTAGE OF UTERUS      HYSTERECTOMY      RALH for fibroids (still has ovaries)    PLACEMENT, TRANSOBTURATOR TAPE N/A 2014    Performed by [facility]ra [facility]ilar [facility] at [facility] OR    PYELOGRAM, RETROGRADE Right 2019    [facility] at [facility] OR    ROBOTIC HYSTERECTOMY N/A 2014    [facility] at [facility] OR    TUBAL LIGATION       Family History   Problem Relation Age of Onset    Anesthesia problems Other     Breast cancer Maternal Aunt     Breast cancer Paternal Aunt     Ovarian cancer Paternal Aunt     Colon cancer Brother     Thrombophilia Neg Hx      Social History     Socioeconomic History    Marital status:      Spouse name: Not on file    Number of children: Not on file    Years of education: Not on file    Highest education level: Not on file   Social Needs    Financial resource strain: Not on file    Food insecurity - worry: Not on file    Food insecurity - inability: Not on file    Transportation needs - medical: Not on file    Transportation needs - non-medical: Not on  file   Occupational History    Not on file   Tobacco Use    Smoking status: Former Smoker     Packs/day: 1.00     Years: 25.00     Pack years: 25.00     Last attempt to quit: 10/1/2018     Years since quittin.3    Smokeless tobacco: Never Used    Tobacco comment: States started quit 2 months ago after 30 years   Substance and Sexual Activity    Alcohol use: No     Alcohol/week: 0.0 oz     Frequency: Never     Comment: occasionally    Drug use: No    Sexual activity: Not Currently     Partners: Male     Comment: hyst; mut monog   Other Topics Concern    Not on file   Social History Narrative    Not on file     Patient Active Problem List   Diagnosis    Hypertension    Osteoarthritis of both knees    History of CHF (congestive heart failure)    Hyperlipidemia    Pleural effusion on right    Peritoneal carcinomatosis    Morbid obesity    Pelvic mass     Review of patient's allergies indicates:  No Known Allergies    The following were reviewed at this visit: active problem list, medication list, allergies, family history, social history, and health maintenance.    Medications:  Current Outpatient Medications on File Prior to Visit   Medication Sig Dispense Refill    multivitamin with minerals tablet Take by mouth.      oxyCODONE-acetaminophen (PERCOCET)  mg per tablet Take 1 tablet by mouth every 8 (eight) hours as needed. 20 tablet 0    rosuvastatin (CRESTOR) 10 MG tablet Take 1 tablet (10 mg total) by mouth once daily. 90 tablet 1    zinc gluconate 50 mg tablet Take 50 mg by mouth once daily.      albuterol 90 mcg/actuation inhaler Inhale 2 puffs into the lungs every 4 (four) hours as needed for Wheezing. Rescue 18 g 0     No current facility-administered medications on file prior to visit.        Medications have been reviewed and reconciled with patient at this visit.  Barriers to medications present (no)    Adverse reactions to current medications (no)    Over the counter  medications reviewed (Yes ), and if needed added to active Medication list at this visit.     Exam:  Wt Readings from Last 3 Encounters:   02/07/19 105.9 kg (233 lb 7.5 oz)   01/30/19 108.6 kg (239 lb 6.7 oz)   12/20/18 111.9 kg (246 lb 11.1 oz)     Temp Readings from Last 3 Encounters:   02/07/19 96.3 °F (35.7 °C) (Tympanic)   01/30/19 97.9 °F (36.6 °C)   07/19/18 96 °F (35.6 °C) (Tympanic)     BP Readings from Last 3 Encounters:   02/07/19 130/82   01/30/19 127/72   12/20/18 138/82     Pulse Readings from Last 3 Encounters:   02/07/19 89   01/30/19 81   07/19/18 71     Body mass index is 36.57 kg/m².      ROS  Physical Exam   Constitutional: She is oriented to person, place, and time. She appears well-developed and well-nourished. No distress.   HENT:   Head: Normocephalic and atraumatic.   Right Ear: External ear normal.   Left Ear: External ear normal.   Nose: Nose normal.   Mouth/Throat: Oropharynx is clear and moist. No oropharyngeal exudate.   Eyes: Conjunctivae and EOM are normal. Pupils are equal, round, and reactive to light. Right eye exhibits no discharge. Left eye exhibits no discharge.   Neck: Normal range of motion. Neck supple. No thyromegaly present.   Cardiovascular: Normal rate, regular rhythm, normal heart sounds and intact distal pulses.   No murmur heard.  Pulmonary/Chest: Effort normal. No respiratory distress. She has decreased breath sounds in the right lower field. She has no wheezes.   Abdominal: Soft. Bowel sounds are normal. She exhibits no distension and no mass. There is no tenderness.   Musculoskeletal: Normal range of motion. She exhibits no edema.        Left foot: There is tenderness.        Feet:    Left heel pain   Lymphadenopathy:     She has no cervical adenopathy.   Neurological: She is alert and oriented to person, place, and time. No cranial nerve deficit.   Skin: Capillary refill takes less than 2 seconds. She is not diaphoretic.   Psychiatric: She has a normal mood and  affect. Her behavior is normal. Judgment and thought content normal.   Nursing note and vitals reviewed.      Laboratory Reviewed ({N/A)  Lab Results   Component Value Date    WBC 9.22 01/30/2019    HGB 11.8 (L) 01/30/2019    HCT 37.5 01/30/2019     01/30/2019    CHOL 171 02/09/2018    TRIG 40 02/09/2018    HDL 66 02/09/2018    ALT 30 01/30/2019    AST 31 01/30/2019     01/30/2019    K 3.9 01/30/2019     01/30/2019    CREATININE 0.7 01/30/2019    BUN 10 01/30/2019    CO2 26 01/30/2019    TSH 1.869 04/19/2013    INR 1.0 01/28/2019       Casie was seen today for follow-up, dizziness and medication refill.    Diagnoses and all orders for this visit:    Pelvic mass   Patient has appt with oncology tomorrow to review path report, await recommendation on treatment protocol.    Discussed probably ovarian source with the pleural effusion    Pleural effusion on right   Less SOB today   Decreased BS on right side.    If symptoms increase again, may need therapeutic thoracentesis.     Essential hypertension   Refilled BP meds    Heel pain, unspecified laterality   Appt with Podiatry    Other orders  -     Discontinue: olmesartan-hydrochlorothiazide (BENICAR HCT) 40-12.5 mg Tab; Take 1 tablet by mouth once daily.  -     olmesartan-hydrochlorothiazide (BENICAR HCT) 40-12.5 mg Tab; Take 1 tablet by mouth once daily.    Renal Stent   IVP reviewed   Patient with discomfort with stent   Has urology follow up        Care Plan/Goals: Reviewed  (Yes)  Goals     None          Follow up: No Follow-up on file.    After visit summary was printed and given to patient upon discharge today.  Patient goals and care plan are included in After Visit Summary.

## 2019-02-08 ENCOUNTER — TELEPHONE (OUTPATIENT)
Dept: UROLOGY | Facility: CLINIC | Age: 64
End: 2019-02-08

## 2019-02-08 ENCOUNTER — OFFICE VISIT (OUTPATIENT)
Dept: HEMATOLOGY/ONCOLOGY | Facility: CLINIC | Age: 64
End: 2019-02-08
Payer: COMMERCIAL

## 2019-02-08 VITALS
RESPIRATION RATE: 18 BRPM | DIASTOLIC BLOOD PRESSURE: 88 MMHG | SYSTOLIC BLOOD PRESSURE: 130 MMHG | HEIGHT: 67 IN | TEMPERATURE: 99 F | BODY MASS INDEX: 36.92 KG/M2 | HEART RATE: 74 BPM | WEIGHT: 235.25 LBS | OXYGEN SATURATION: 98 %

## 2019-02-08 DIAGNOSIS — C56.9 MALIGNANT NEOPLASM OF OVARY, UNSPECIFIED LATERALITY: ICD-10-CM

## 2019-02-08 DIAGNOSIS — C78.6 PERITONEAL CARCINOMATOSIS: Primary | ICD-10-CM

## 2019-02-08 PROBLEM — R91.8 LUNG MASS: Status: RESOLVED | Noted: 2019-01-28 | Resolved: 2019-02-08

## 2019-02-08 PROCEDURE — 99999 PR PBB SHADOW E&M-EST. PATIENT-LVL III: ICD-10-PCS | Mod: PBBFAC,,, | Performed by: INTERNAL MEDICINE

## 2019-02-08 PROCEDURE — 3079F DIAST BP 80-89 MM HG: CPT | Mod: CPTII,S$GLB,, | Performed by: INTERNAL MEDICINE

## 2019-02-08 PROCEDURE — 99215 OFFICE O/P EST HI 40 MIN: CPT | Mod: S$GLB,,, | Performed by: INTERNAL MEDICINE

## 2019-02-08 PROCEDURE — 3075F PR MOST RECENT SYSTOLIC BLOOD PRESS GE 130-139MM HG: ICD-10-PCS | Mod: CPTII,S$GLB,, | Performed by: INTERNAL MEDICINE

## 2019-02-08 PROCEDURE — 99999 PR PBB SHADOW E&M-EST. PATIENT-LVL III: CPT | Mod: PBBFAC,,, | Performed by: INTERNAL MEDICINE

## 2019-02-08 PROCEDURE — 3008F BODY MASS INDEX DOCD: CPT | Mod: CPTII,S$GLB,, | Performed by: INTERNAL MEDICINE

## 2019-02-08 PROCEDURE — 3075F SYST BP GE 130 - 139MM HG: CPT | Mod: CPTII,S$GLB,, | Performed by: INTERNAL MEDICINE

## 2019-02-08 PROCEDURE — 3008F PR BODY MASS INDEX (BMI) DOCUMENTED: ICD-10-PCS | Mod: CPTII,S$GLB,, | Performed by: INTERNAL MEDICINE

## 2019-02-08 PROCEDURE — 3079F PR MOST RECENT DIASTOLIC BLOOD PRESSURE 80-89 MM HG: ICD-10-PCS | Mod: CPTII,S$GLB,, | Performed by: INTERNAL MEDICINE

## 2019-02-08 PROCEDURE — 99215 PR OFFICE/OUTPT VISIT, EST, LEVL V, 40-54 MIN: ICD-10-PCS | Mod: S$GLB,,, | Performed by: INTERNAL MEDICINE

## 2019-02-08 NOTE — PROGRESS NOTES
Hematology/Oncology Office Note    Cc:  Follow-up on pathology    Diagnosis:  Stage IV ovarian cancer  peritoneal carcinomatosis with malignant pleural effusion    Ms. Gaines is a 63-year-old female with PMH significant for HTN, hyperlipidemia, presents to [facility] ED complaining of progressively worsening shortness of breath associated with left abdominal discomfort.  She reports chills but no fever.  Complains of shortness of breath without cough, congestion.  Denies hemoptysis or recent weight loss.  In the ED chest x-ray revealed large right sided pleural effusion.  CT abdomen revealed numerous soft tissue masses within the left kimani abdomen, suggestive of peritoneal carcinomatosis.  Patient has no known history of malignancy.      Patient underwent CT-guided biopsy of omental mass which demonstrated carcinoma consistent with ovarian primary.  In addition cytology from thoracentesis was positive for malignancy.  She underwent IVP for right-sided hydronephrosis and the patient underwent right ureteral stenting by Dr. Santiago.    She presents today to discuss pathology and treatment options.  Patient reports significant right flank cramping which is worsening with urination.          Treatment:    Surveillance:      Past Medical History:   Diagnosis Date    Anemia     Anxiety     Arthritis     knees    CHF (congestive heart failure)     Hyperlipemia     Hypertension     Neck pain          Social History:  Former smoker quit in 10/2008  No alcohol or illicit drugs      Family History: family history includes Anesthesia problems in her other; Breast cancer in her maternal aunt and paternal aunt; Colon cancer in her brother; Ovarian cancer in her paternal aunt.        HPI  Review of Systems   Constitutional: Positive for fatigue. Negative for appetite change, fever and unexpected weight change.   HENT: Negative for congestion, dental problem, drooling, ear discharge, facial swelling, mouth sores,  "nosebleeds, sore throat and trouble swallowing.    Eyes: Negative for photophobia.   Respiratory: Negative for apnea, cough, choking, chest tightness and shortness of breath.    Cardiovascular: Negative for chest pain, palpitations and leg swelling.   Gastrointestinal: Negative for abdominal distention, abdominal pain, anal bleeding, blood in stool, constipation, diarrhea, nausea and vomiting.   Endocrine: Negative for polyphagia.   Genitourinary: Positive for dysuria. Negative for difficulty urinating, dyspareunia, flank pain, frequency, hematuria, pelvic pain and vaginal bleeding.        Right flank pain   Musculoskeletal: Negative for arthralgias, back pain, gait problem, joint swelling, myalgias and neck pain.   Skin: Negative for pallor, rash and wound.   Neurological: Negative for tremors, seizures, syncope, speech difficulty, weakness, light-headedness and numbness.   Hematological: Negative for adenopathy. Does not bruise/bleed easily.   Psychiatric/Behavioral: Negative for agitation, behavioral problems, confusion, dysphoric mood and hallucinations. The patient is not nervous/anxious and is not hyperactive.        Objective:         Vitals:    02/08/19 1338   BP: 130/88   Pulse: 74   Resp: 18   Temp: 98.6 °F (37 °C)   TempSrc: Oral   SpO2: 98%   Weight: 106.7 kg (235 lb 3.7 oz)   Height: 5' 7" (1.702 m)     Physical Exam   Constitutional: She is oriented to person, place, and time. She appears well-developed and well-nourished. No distress.   Well groomed   HENT:   Head: Normocephalic and atraumatic.   Nose: Nose normal.   Mouth/Throat: Oropharynx is clear and moist. No oropharyngeal exudate.   Eyes: Conjunctivae and EOM are normal. Pupils are equal, round, and reactive to light.   Neck: Normal range of motion. Neck supple. No JVD present. No tracheal deviation present. No thyromegaly present.   Cardiovascular: Normal rate, regular rhythm, normal heart sounds and intact distal pulses. Exam reveals no " friction rub.   No murmur heard.  Pulmonary/Chest: Effort normal and breath sounds normal. No stridor. No respiratory distress. She has no wheezes. She has no rales. She exhibits no tenderness.   Abdominal: Soft. Bowel sounds are normal. There is no tenderness. There is no rebound and no guarding.   Musculoskeletal: Normal range of motion. She exhibits no edema.   Lymphadenopathy:     She has no cervical adenopathy.   Neurological: She is alert and oriented to person, place, and time. No cranial nerve deficit. She exhibits normal muscle tone.   Skin: Skin is warm and dry. Capillary refill takes less than 2 seconds. No abrasion, no bruising, no ecchymosis and no rash noted. No cyanosis. Nails show no clubbing.   Psychiatric: She has a normal mood and affect. Her behavior is normal. Judgment and thought content normal.       Assessment:       63-year-old female status post hospitalization for new diagnosis of peritoneal carcinomatosis with malignant right pleural effusion.  Final pathology is consistent with ovarian primary with CA 2740.  We will proceed with staging PET scan and have patient follow up with GYN Oncology for treatment recommendations.  I discussed the pathology results with the patient and her family members be a conference call.  She is willing to see GYN Oncology at [facility] in [place].    Stage IV ovarian cancer/peritoneal carcinomatosis with malignant right pleural effusion:  --PET scan  --gyn oncology evaluation  --BRCA testing

## 2019-02-08 NOTE — TELEPHONE ENCOUNTER
----- Message from Liseth Sahu sent at 2/8/2019  2:58 PM CST -----  Contact: pt  Type:  Sooner Apoointment Request    Caller is requesting a sooner appointment.  Caller declined first available appointment listed below.  Caller will not accept being placed on the waitlist and is requesting a message be sent to doctor.  Name of Caller: Pt  When is the first available appointment?02/21/19  Symptoms: severe pain after having her surgery,  Would the patient rather a call back or a response via [facility]? Call back  Best Call Back Number: [phone]  Additional Information: She is requesting to get sooner appt due being in severe pain.

## 2019-02-11 ENCOUNTER — HOSPITAL ENCOUNTER (OUTPATIENT)
Dept: RADIOLOGY | Facility: HOSPITAL | Age: 64
Discharge: HOME | End: 2019-02-11
Attending: PODIATRIST
Payer: COMMERCIAL

## 2019-02-11 ENCOUNTER — OFFICE VISIT (OUTPATIENT)
Dept: PODIATRY | Facility: CLINIC | Age: 64
End: 2019-02-11
Payer: COMMERCIAL

## 2019-02-11 ENCOUNTER — OFFICE VISIT (OUTPATIENT)
Dept: SURGERY | Facility: CLINIC | Age: 64
End: 2019-02-11
Payer: COMMERCIAL

## 2019-02-11 VITALS
BODY MASS INDEX: 36.79 KG/M2 | HEART RATE: 90 BPM | HEIGHT: 67 IN | SYSTOLIC BLOOD PRESSURE: 113 MMHG | DIASTOLIC BLOOD PRESSURE: 67 MMHG | WEIGHT: 234.38 LBS | TEMPERATURE: 99 F

## 2019-02-11 VITALS
DIASTOLIC BLOOD PRESSURE: 76 MMHG | BODY MASS INDEX: 36.29 KG/M2 | SYSTOLIC BLOOD PRESSURE: 125 MMHG | WEIGHT: 231.69 LBS | HEART RATE: 90 BPM

## 2019-02-11 DIAGNOSIS — M72.2 PLANTAR FASCIITIS, LEFT: ICD-10-CM

## 2019-02-11 DIAGNOSIS — M21.42 ACQUIRED PES PLANUS, LEFT: ICD-10-CM

## 2019-02-11 DIAGNOSIS — M79.672 LEFT FOOT PAIN: ICD-10-CM

## 2019-02-11 DIAGNOSIS — M24.572 CONTRACTURE, LEFT ANKLE: ICD-10-CM

## 2019-02-11 DIAGNOSIS — M25.572 PAIN IN LEFT ANKLE AND JOINTS OF LEFT FOOT: ICD-10-CM

## 2019-02-11 DIAGNOSIS — M76.822 POSTERIOR TIBIAL TENDON DYSFUNCTION, LEFT: Primary | ICD-10-CM

## 2019-02-11 DIAGNOSIS — C78.6 PERITONEAL CARCINOMATOSIS: Primary | ICD-10-CM

## 2019-02-11 DIAGNOSIS — Q74.2 ACCESSORY NAVICULAR BONE OF LEFT FOOT: ICD-10-CM

## 2019-02-11 DIAGNOSIS — M76.62 TENDONITIS, ACHILLES, LEFT: ICD-10-CM

## 2019-02-11 DIAGNOSIS — M79.672 LEFT FOOT PAIN: Primary | ICD-10-CM

## 2019-02-11 PROCEDURE — 99203 OFFICE O/P NEW LOW 30 MIN: CPT | Mod: S$GLB,,, | Performed by: PODIATRIST

## 2019-02-11 PROCEDURE — 73630 X-RAY EXAM OF FOOT: CPT | Mod: TC,LT

## 2019-02-11 PROCEDURE — 3078F PR MOST RECENT DIASTOLIC BLOOD PRESSURE < 80 MM HG: ICD-10-PCS | Mod: CPTII,S$GLB,, | Performed by: SURGERY

## 2019-02-11 PROCEDURE — 99203 PR OFFICE/OUTPT VISIT, NEW, LEVL III, 30-44 MIN: ICD-10-PCS | Mod: S$GLB,,, | Performed by: PODIATRIST

## 2019-02-11 PROCEDURE — 3074F SYST BP LT 130 MM HG: CPT | Mod: CPTII,S$GLB,, | Performed by: SURGERY

## 2019-02-11 PROCEDURE — 3074F PR MOST RECENT SYSTOLIC BLOOD PRESSURE < 130 MM HG: ICD-10-PCS | Mod: CPTII,S$GLB,, | Performed by: PODIATRIST

## 2019-02-11 PROCEDURE — 73630 XR FOOT COMPLETE 3 VIEW LEFT: ICD-10-PCS | Mod: 26,LT,, | Performed by: RADIOLOGY

## 2019-02-11 PROCEDURE — 3078F PR MOST RECENT DIASTOLIC BLOOD PRESSURE < 80 MM HG: ICD-10-PCS | Mod: CPTII,S$GLB,, | Performed by: PODIATRIST

## 2019-02-11 PROCEDURE — 3008F PR BODY MASS INDEX (BMI) DOCUMENTED: ICD-10-PCS | Mod: CPTII,S$GLB,, | Performed by: SURGERY

## 2019-02-11 PROCEDURE — 99999 PR PBB SHADOW E&M-EST. PATIENT-LVL III: ICD-10-PCS | Mod: PBBFAC,,, | Performed by: PODIATRIST

## 2019-02-11 PROCEDURE — 3008F PR BODY MASS INDEX (BMI) DOCUMENTED: ICD-10-PCS | Mod: CPTII,S$GLB,, | Performed by: PODIATRIST

## 2019-02-11 PROCEDURE — 3074F PR MOST RECENT SYSTOLIC BLOOD PRESSURE < 130 MM HG: ICD-10-PCS | Mod: CPTII,S$GLB,, | Performed by: SURGERY

## 2019-02-11 PROCEDURE — 3008F BODY MASS INDEX DOCD: CPT | Mod: CPTII,S$GLB,, | Performed by: SURGERY

## 2019-02-11 PROCEDURE — 73630 X-RAY EXAM OF FOOT: CPT | Mod: 26,LT,, | Performed by: RADIOLOGY

## 2019-02-11 PROCEDURE — 99999 PR PBB SHADOW E&M-EST. PATIENT-LVL IV: CPT | Mod: PBBFAC,,, | Performed by: SURGERY

## 2019-02-11 PROCEDURE — 99203 OFFICE O/P NEW LOW 30 MIN: CPT | Mod: S$GLB,,, | Performed by: SURGERY

## 2019-02-11 PROCEDURE — 3078F DIAST BP <80 MM HG: CPT | Mod: CPTII,S$GLB,, | Performed by: SURGERY

## 2019-02-11 PROCEDURE — 99203 PR OFFICE/OUTPT VISIT, NEW, LEVL III, 30-44 MIN: ICD-10-PCS | Mod: S$GLB,,, | Performed by: SURGERY

## 2019-02-11 PROCEDURE — 3078F DIAST BP <80 MM HG: CPT | Mod: CPTII,S$GLB,, | Performed by: PODIATRIST

## 2019-02-11 PROCEDURE — 3074F SYST BP LT 130 MM HG: CPT | Mod: CPTII,S$GLB,, | Performed by: PODIATRIST

## 2019-02-11 PROCEDURE — 3008F BODY MASS INDEX DOCD: CPT | Mod: CPTII,S$GLB,, | Performed by: PODIATRIST

## 2019-02-11 PROCEDURE — 99999 PR PBB SHADOW E&M-EST. PATIENT-LVL IV: ICD-10-PCS | Mod: PBBFAC,,, | Performed by: SURGERY

## 2019-02-11 PROCEDURE — 99999 PR PBB SHADOW E&M-EST. PATIENT-LVL III: CPT | Mod: PBBFAC,,, | Performed by: PODIATRIST

## 2019-02-11 RX ORDER — SODIUM CHLORIDE 9 MG/ML
INJECTION, SOLUTION INTRAVENOUS CONTINUOUS
Status: CANCELLED | OUTPATIENT
Start: 2019-02-11

## 2019-02-11 RX ORDER — NABUMETONE 500 MG/1
500 TABLET, FILM COATED ORAL 2 TIMES DAILY
Qty: 60 TABLET | Refills: 1 | Status: SHIPPED | OUTPATIENT
Start: 2019-02-11 | End: 2019-03-13

## 2019-02-11 RX ORDER — LIDOCAINE HYDROCHLORIDE 10 MG/ML
1 INJECTION, SOLUTION EPIDURAL; INFILTRATION; INTRACAUDAL; PERINEURAL ONCE
Status: CANCELLED | OUTPATIENT
Start: 2019-02-11 | End: 2019-02-11

## 2019-02-11 NOTE — PROGRESS NOTES
Subjective:       Patient ID: Casie Gaines is a 63 y.o. female.    Chief Complaint: Heel Pain (left heel)      HPI: Casie Gaines presents to the office with the chief complaint of pains to the left foot and ankle. Pains are most notable at the medial aspect of the foot and ankle. The pains are rated as 8/10 and are described as moderate to severe in nature. The pains have been on going now for the past several weeks to a month or so, and are worsening. The patient denies any identifiable trauma. The patient states in frequent NSAIDs for management. The patient states that prolonged walking and standing exacerbates and causes the symptoms. The patient does state mild associated swelling as well. Patient's Primary Care Provider is Rossana Ang MD.     Review of patient's allergies indicates:  No Known Allergies    Past Medical History:   Diagnosis Date    Anemia     Anxiety     Arthritis     knees    CHF (congestive heart failure)     Hyperlipemia     Hypertension     Neck pain        Family History   Problem Relation Age of Onset    Anesthesia problems Other     Breast cancer Maternal Aunt     Breast cancer Paternal Aunt     Ovarian cancer Paternal Aunt     Colon cancer Brother     Thrombophilia Neg Hx        Social History     Socioeconomic History    Marital status:      Spouse name: Not on file    Number of children: Not on file    Years of education: Not on file    Highest education level: Not on file   Social Needs    Financial resource strain: Not on file    Food insecurity - worry: Not on file    Food insecurity - inability: Not on file    Transportation needs - medical: Not on file    Transportation needs - non-medical: Not on file   Occupational History    Not on file   Tobacco Use    Smoking status: Former Smoker     Packs/day: 1.00     Years: 25.00     Pack years: 25.00     Last attempt to quit: 10/1/2018     Years since quittin.3    Smokeless tobacco:  Never Used    Tobacco comment: States started quit 2 months ago after 30 years   Substance and Sexual Activity    Alcohol use: No     Alcohol/week: 0.0 oz     Frequency: Never     Comment: occasionally    Drug use: No    Sexual activity: Not Currently     Partners: Male     Comment: hyst; mut monog   Other Topics Concern    Not on file   Social History Narrative    Not on file       Past Surgical History:   Procedure Laterality Date    breast reduction  10/02/2018     SECTION      X 1    CYSTOSCOPY, WITH URETERAL STENT INSERTION Right 2019    [facility] OR    DILATION AND CURETTAGE OF UTERUS      HYSTERECTOMY      RALH for fibroids (still has ovaries)    PLACEMENT, TRANSOBTURATOR TAPE N/A 2014    Performed by Sayra Aguilar MD at [facility] OR    PYELOGRAM, RETROGRADE Right 2019    Performed by Tim[facility], [facility] at [facility] OR    ROBOTIC HYSTERECTOMY N/A 2014    Performed by [facility] at [facility] OR    TUBAL LIGATION         Review of Systems   Constitutional: Negative for chills, fatigue and fever.   HENT: Negative for hearing loss.    Eyes: Negative for photophobia and visual disturbance.   Respiratory: Negative for cough, chest tightness, shortness of breath and wheezing.    Cardiovascular: Negative for chest pain and palpitations.   Gastrointestinal: Negative for constipation, diarrhea, nausea and vomiting.   Endocrine: Negative for cold intolerance and heat intolerance.   Genitourinary: Negative for flank pain.   Musculoskeletal: Positive for gait problem. Negative for neck pain and neck stiffness.   Skin: Negative for wound.   Neurological: Negative for light-headedness, numbness and headaches.   Psychiatric/Behavioral: Negative for sleep disturbance.         Objective:   /76 (BP Location: Right arm, Patient Position: Sitting, BP Method: Large (Automatic))   Pulse 90   Wt 105.1 kg (231 lb 11.3 oz)   BMI  36.29 kg/m²     X-Ray Foot Complete Left  Narrative: EXAMINATION:  XR FOOT COMPLETE 3 VIEW LEFT    CLINICAL HISTORY:  .  Pain in left foot    TECHNIQUE:  AP, lateral and oblique views of the left foot were performed.    COMPARISON:  None    FINDINGS:  No acute osseous abnormality.  Degenerative findings present most prevalent at the 1st MTP joint.  Pes planus deformity noted.  Prominent plantar calcaneal enthesophyte present.  Impression: As above    Electronically signed by: Neil Mejia MD  Date:    02/11/2019  Time:    08:30      LOWER EXTREMITY PHYSICAL EXAMINATION  DERMATOLOGY: Skin is supple, dry and intact. No ecchymosis is noted. No hypertrophic skin formation. No erythema or cellulitis is noted.     VASCULAR: The right dorsalis pedis pulse is 2/4 and the posterior tibial pulse is 2/4. The left dorsalis pedis pulse is 2/4 and the posterior tibial pulse is 2/4. Hair growth is noted on the dorsal foot and digits. Proximal to distal, warm to warm. Capillary refill time is WNL at less than 3s.    NEUROLOGY: Protective sensation is intact via 5.07 Easton Aren monofilament. Proprioception is intact. Sensation to light touch is intact. Upon palpation of the interspaces, there are no neurological sensations stated that radiate proximal or distal. Upon compression of the metatarsal heads from medial to lateral, no neurological sensations or symptoms are stated.    ORTHOPEDIC: There is moderate collapsing pes planovalgus on the left foot. There is severe tenderness to palpation of the navicular tuberosity on the left foot. There is moderate edema noted along the course of the PT tendon on the left foot. There is moderate retro-malleolar edema (medial malleolus). There is no apparent pains to palpation of the medial malleolus.  Equinus contracture is noted.  No pain with palpation and/or range of motion of the ankle joint.  There is no crepitus noted with range of motion of the ankle joint. The ankle is not  in valgus and the RCSP is neutral.  There is not limited ROM of the STJ and the MTJ. The hindfoot is not in valgus. Upon standing, there is mild royer-talar subluxation noted on the left foot. There is mild forefoot/midfoot abduction on the hindfoot. The patient is able to double heel rise, but has difficulties with single heel rise on the left foot. Slight discomfort to palpation of the plantar medial calcaneal tubercle of the left heel bone.  Slight discomfort to palpation along the distal aspect of the Achilles tendon.  Gait pattern is antalgic at this time.       Assessment:     1. Posterior tibial tendon dysfunction, left    2. Pain in left ankle and joints of left foot    3. Contracture, left ankle    4. Acquired pes planus, left    5. Accessory navicular bone of left foot    6. Plantar fasciitis, left    7. Tendonitis, Achilles, left        Plan:     Posterior tibial tendon dysfunction, left  -     nabumetone (RELAFEN) 500 MG tablet; Take 1 tablet (500 mg total) by mouth 2 (two) times daily.  Dispense: 60 tablet; Refill: 1    Pain in left ankle and joints of left foot  -     nabumetone (RELAFEN) 500 MG tablet; Take 1 tablet (500 mg total) by mouth 2 (two) times daily.  Dispense: 60 tablet; Refill: 1    Contracture, left ankle    Acquired pes planus, left  -     nabumetone (RELAFEN) 500 MG tablet; Take 1 tablet (500 mg total) by mouth 2 (two) times daily.  Dispense: 60 tablet; Refill: 1    Accessory navicular bone of left foot  -     nabumetone (RELAFEN) 500 MG tablet; Take 1 tablet (500 mg total) by mouth 2 (two) times daily.  Dispense: 60 tablet; Refill: 1    Plantar fasciitis, left    Tendonitis, Achilles, left        Thorough discussion is had with the patient today, concerning the diagnosis, its etiology, and the treatment algorithm at present.  XRAYS are reviewed in detail with the patient. All questions and concerns regarding findings and its/their implications are outlined and discussed.  Patient will  need protected weight-bearing for duration 10 14 days.  CAM Walker (Walking Boot), short/tall is dispensed to the patient. The CAM Walker is appropriately fitted and customized to the patient's lower extremity physique by the LPN/MA. Patient to ambulate with the CAM Walker at all times. The patient should not sleep with the device or shower with the device, or drive with the device (if dispensed for right ankle/foot pathology).  Follow up in approximately 10-14 days.  NSAIDs as needed.             Future Appointments   Date Time Provider Department Center   2/11/2019  3:00 PM Ulisses Monzon MD [facility] GENSUR [facility]   2/13/2019 10:15 AM Ismael Juarez MD [facility] GYN ONC [facility]   2/18/2019 10:30 AM [facility] PET CT1 BR PET CT [facility]   2/19/2019  1:00 PM Will Trevizo Jr., MD [facility] HEM ONC [facility]   2/21/2019  3:40 PM Timmy Santiago IV, MD ON UROLOGY [facility]   2/25/2019  8:30 AM Kashif Lion DPM [facility] POD [facility]

## 2019-02-11 NOTE — LETTER
February 11, 2019      [facility]  [address] [facility] [address]  Phone: [phone]  Fax: [phone]       Patient: Casie Gaines   YOB: 1955  Date of Visit: 02/11/2019    To Whom It May Concern:    Andrés Gaines  was at [facility] on 02/11/2019. She may return to work on 2/11/2019 with restrictions of wearing walking boot at all times, no prolonged walking or standing, no climbing of stairs or ladders. If you have any questions or concerns, or if I can be of further assistance, please do not hesitate to contact me.    Sincerely,        Dr. Kashif Lion, [facility]

## 2019-02-11 NOTE — LETTER
February 12, 2019      [place]  [address]  [place] [address]           [facility]  [address]  [place] [address]  Phone: [phone]  Fax: [phone]          Patient: Casie Gaines   MR Number: [id number]   YOB: 1955   Date of Visit: 2/11/2019       Dear Dr. Will Trevizo Jr.:    Thank you for referring Casie Gaines to me for evaluation. Attached you will find relevant portions of my assessment and plan of care.    If you have questions, please do not hesitate to call me. I look forward to following Casie Gaines along with you.    Sincerely,    Ulisses Monzon MD    Enclosure  CC:  No Recipients    If you would like to receive this communication electronically, please contact [email or web address] or [phone] to request more information on [employer] access.    For providers and/or their staff who would like to refer a patient to [facility], please contact us through our one-stop-shop provider referral line, [facility], at [phone].    If you feel you have received this communication in error or would no longer like to receive these types of communications, please e-mail [email or web address]

## 2019-02-12 ENCOUNTER — TELEPHONE (OUTPATIENT)
Dept: INTERNAL MEDICINE | Facility: CLINIC | Age: 64
End: 2019-02-12

## 2019-02-12 ENCOUNTER — HOSPITAL ENCOUNTER (OUTPATIENT)
Dept: RADIOLOGY | Facility: HOSPITAL | Age: 64
Discharge: HOME | End: 2019-02-12
Attending: FAMILY MEDICINE
Payer: COMMERCIAL

## 2019-02-12 DIAGNOSIS — Z01.818 PRE-OP EXAMINATION: ICD-10-CM

## 2019-02-12 DIAGNOSIS — Z01.818 PRE-OP EXAMINATION: Primary | ICD-10-CM

## 2019-02-12 PROCEDURE — 71046 X-RAY EXAM CHEST 2 VIEWS: CPT | Mod: TC

## 2019-02-12 PROCEDURE — 71046 X-RAY EXAM CHEST 2 VIEWS: CPT | Mod: 26,,, | Performed by: RADIOLOGY

## 2019-02-12 PROCEDURE — 71046 XR CHEST PA AND LATERAL: ICD-10-PCS | Mod: 26,,, | Performed by: RADIOLOGY

## 2019-02-12 NOTE — PROGRESS NOTES
"Subjective:      Patient ID: Casie Gaines is a 63 y.o. female.    Chief Complaint: Ovarian Cancer      HPI      Ms. Gaines is a 63yr old para referred from Dr. Trevizo for st IV ovarian cancer.     Seen by Hem Onc on Feb 8th, per his note "complaining of progressively worsening shortness of breath associated with left abdominal discomfort. Denies hemoptysis or recent weight loss.  In the ED chest x-ray revealed large right sided pleural effusion. CT abdomen revealed numerous soft tissue masses within the left kimani abdomen, suggestive of peritoneal carcinomatosis.      Patient underwent CT-guided biopsy of omental mass which demonstrated carcinoma consistent with ovarian primary. Cytology from thoracentesis (860cc) was positive for malignancy.  She underwent IVP for right-sided hydronephrosis and the patient underwent right ureteral stenting by Dr. Santiago"     2740  CEA 65.4      Family history: Breast cancer in her maternal aunt and paternal aunt; Colon cancer in her brother; Ovarian cancer in her paternal aunt.    Genetic testing initiated by Hem Onc.    Review of Systems   Constitutional: Negative for activity change, appetite change, chills, fatigue and fever.   HENT: Negative for hearing loss, mouth sores, nosebleeds, sore throat and tinnitus.    Eyes: Negative for visual disturbance.   Respiratory: Negative for cough, chest tightness, shortness of breath and wheezing.    Cardiovascular: Negative for chest pain and leg swelling.   Gastrointestinal: Negative for abdominal distention, abdominal pain, blood in stool, constipation, diarrhea, nausea and vomiting.   Genitourinary: Negative for dysuria, flank pain, frequency, hematuria, pelvic pain, vaginal bleeding, vaginal discharge and vaginal pain.   Musculoskeletal: Negative for arthralgias and back pain.   Skin: Negative for rash.   Neurological: Negative for dizziness, seizures, syncope, weakness and numbness.   Hematological: Does not " bruise/bleed easily.   Psychiatric/Behavioral: Negative for confusion and sleep disturbance. The patient is not nervous/anxious.        Past Medical History:   Diagnosis Date    Anemia     Anxiety     Arthritis     knees    Cancer     ovarian    CHF (congestive heart failure)     Hyperlipemia     Hypertension     Neck pain      Past Surgical History:   Procedure Laterality Date    breast reduction  10/02/2018     SECTION      X 1    CYSTOSCOPY, WITH URETERAL STENT INSERTION Right 2019    [facility]    DILATION AND CURETTAGE OF UTERUS      HYSTERECTOMY      RALH for fibroids (still has ovaries)    PLACEMENT, TRANSOBTURATOR TAPE N/A 2014    [facility] at [facility] OR    PYELOGRAM, RETROGRADE Right 2019    [facility] OR    ROBOTIC HYSTERECTOMY N/A 2014    [facility] OR    TUBAL LIGATION       Family History   Problem Relation Age of Onset    Anesthesia problems Other     Breast cancer Maternal Aunt     Breast cancer Paternal Aunt     Ovarian cancer Paternal Aunt     Colon cancer Brother     Thrombophilia Neg Hx      Social History     Socioeconomic History    Marital status:      Spouse name: Not on file    Number of children: Not on file    Years of education: Not on file    Highest education level: Not on file   Social Needs    Financial resource strain: Not on file    Food insecurity - worry: Not on file    Food insecurity - inability: Not on file    Transportation needs - medical: Not on file    Transportation needs - non-medical: Not on file   Occupational History    Not on file   Tobacco Use    Smoking status: Former Smoker     Packs/day: 1.00     Years: 25.00     Pack years: 25.00     Last attempt to quit: 10/1/2018     Years since quittin.3    Smokeless tobacco: Never Used    Tobacco comment: States started  quit 2 months ago after 30 years   Substance and Sexual Activity    Alcohol use: Yes     Alcohol/week: 0.0 oz     Frequency: Never     Comment: occasionally  No alcohol 72h prior to sx    Drug use: No    Sexual activity: Not Currently     Partners: Male     Comment: hyst; mut monog   Other Topics Concern    Not on file   Social History Narrative    Not on file     Current Outpatient Medications   Medication Sig    multivitamin with minerals tablet Take 1 tablet by mouth once daily.     nabumetone (RELAFEN) 500 MG tablet Take 1 tablet (500 mg total) by mouth 2 (two) times daily.    olmesartan-hydrochlorothiazide (BENICAR HCT) 40-12.5 mg Tab Take 1 tablet by mouth once daily.    oxyCODONE-acetaminophen (PERCOCET)  mg per tablet Take 1 tablet by mouth every 8 (eight) hours as needed.    rosuvastatin (CRESTOR) 10 MG tablet Take 1 tablet (10 mg total) by mouth once daily.    zinc gluconate 50 mg tablet Take 50 mg by mouth once daily.    albuterol 90 mcg/actuation inhaler Inhale 2 puffs into the lungs every 4 (four) hours as needed for Wheezing. Rescue    tolterodine (DETROL) 2 MG Tab Take 1 tablet (2 mg total) by mouth 2 (two) times daily.     No current facility-administered medications for this visit.      Review of patient's allergies indicates:  No Known Allergies    Objective:   Physical Exam:   Constitutional: She appears well-developed and well-nourished. No distress.    HENT:   Head: Normocephalic and atraumatic.    Eyes: No scleral icterus.    Neck: Normal range of motion. Neck supple.    Cardiovascular: Normal rate and intact distal pulses.  Exam reveals no cyanosis and no edema.     Pulmonary/Chest: Effort normal. No respiratory distress. She exhibits no tenderness.        Abdominal: Soft. She exhibits distension and fluid wave. She exhibits no ascites and no mass. There is no tenderness. There is no rebound and no guarding. No hernia.     Genitourinary: Rectum normal and vagina normal.  Pelvic exam was performed with patient supine. There is no rash, tenderness or lesion on the right labia. There is no rash, tenderness or lesion on the left labia. Uterus is absent. Right adnexum displays fullness. Right adnexum displays no mass and no tenderness. Left adnexum displays fullness. Left adnexum displays no mass and no tenderness. No bleeding or unspecified prolapse of vaginal walls in the vagina. No vaginal discharge found. Vaginal cuff normal.Labial bartholins normal.Cervix exhibits absence.   Genitourinary Comments: No dominant masses palpable on pelvic exam              Lymphadenopathy:     She has no cervical adenopathy.        Right: No inguinal adenopathy present.        Left: No inguinal adenopathy present.     Skin: No cyanosis.        Assessment:     1. Peritoneal carcinomatosis    2. Morbid obesity    3. Pleural effusion on right    4. History of renal stent        Plan:       Long discussion with the patient regarding her diagnosis and plan.  She is to have a port placed on Monday.  I recommended neoadjuvant Taxol/Carbo/Avastin with imaging prior to C4 to assess for response and possibility of interval surgery.  She is scheduled to have genetic testing as well, which I am in agreement with.  Plan to have her treated in [place] by Dr. Trevizo.  All questions answered and she voiced understanding with the plan.  RTC after C3.  Info on the 3 drug regimen was given to her from the [facility] website.  Rx for Detrol sent to the pharmacy to help with her stent related bladder spasms.

## 2019-02-12 NOTE — TELEPHONE ENCOUNTER
Cards Appt with Dr. Estrada Friday.     Does she want us to choose an external Urologist?  We can use [facility].  Would she like a female urologist.

## 2019-02-12 NOTE — TELEPHONE ENCOUNTER
Schedule for Chest xray- Thursday,  Ask her to come in for recheck on Friday to see MKD for clearance.  I would like her to repeat her Urine on Thursday when she does her chest xray.    Dr. Ang

## 2019-02-12 NOTE — TELEPHONE ENCOUNTER
----- Message from Sophie June MA sent at 2/12/2019 10:37 AM CST -----  Regarding: surgery  Port placement on 02/18/2019  We need a note or clearance stating that she is cleared for surgery. Please let me know if she needs to see you for an appointment or if you can addend your last note.

## 2019-02-13 ENCOUNTER — INITIAL CONSULT (OUTPATIENT)
Dept: GYNECOLOGIC ONCOLOGY | Facility: CLINIC | Age: 64
End: 2019-02-13
Payer: COMMERCIAL

## 2019-02-13 ENCOUNTER — TELEPHONE (OUTPATIENT)
Dept: GYNECOLOGIC ONCOLOGY | Facility: CLINIC | Age: 64
End: 2019-02-13

## 2019-02-13 VITALS
WEIGHT: 231 LBS | BODY MASS INDEX: 36.18 KG/M2 | DIASTOLIC BLOOD PRESSURE: 66 MMHG | SYSTOLIC BLOOD PRESSURE: 122 MMHG | HEART RATE: 86 BPM

## 2019-02-13 DIAGNOSIS — E66.01 MORBID OBESITY: ICD-10-CM

## 2019-02-13 DIAGNOSIS — C78.6 PERITONEAL CARCINOMATOSIS: Primary | ICD-10-CM

## 2019-02-13 DIAGNOSIS — J90 PLEURAL EFFUSION ON RIGHT: ICD-10-CM

## 2019-02-13 PROCEDURE — 99245 PR OFFICE CONSULTATION,LEVEL V: ICD-10-PCS | Mod: S$GLB,,, | Performed by: OBSTETRICS & GYNECOLOGY

## 2019-02-13 PROCEDURE — 99999 PR PBB SHADOW E&M-EST. PATIENT-LVL III: CPT | Mod: PBBFAC,,, | Performed by: OBSTETRICS & GYNECOLOGY

## 2019-02-13 PROCEDURE — 99999 PR PBB SHADOW E&M-EST. PATIENT-LVL III: ICD-10-PCS | Mod: PBBFAC,,, | Performed by: OBSTETRICS & GYNECOLOGY

## 2019-02-13 PROCEDURE — 99245 OFF/OP CONSLTJ NEW/EST HI 55: CPT | Mod: S$GLB,,, | Performed by: OBSTETRICS & GYNECOLOGY

## 2019-02-13 RX ORDER — TOLTERODINE TARTRATE 2 MG/1
2 TABLET, EXTENDED RELEASE ORAL 2 TIMES DAILY
Qty: 60 TABLET | Refills: 11 | Status: SHIPPED | OUTPATIENT
Start: 2019-02-13 | End: 2020-08-06

## 2019-02-13 NOTE — LETTER
February 13, 2019      [place]  [address]  [place] [address]           [facility]  [address]  [place] [address]  Phone: [phone]          Patient: Casie Gaines   MR Number: [id number]   YOB: 1955   Date of Visit: 2/13/2019       Dear Dr. Will Trevizo Jr.:    Thank you for referring Casie Gaines to me for evaluation. Attached you will find relevant portions of my assessment and plan of care.    If you have questions, please do not hesitate to call me. I look forward to following Casie Gaines along with you.    Sincerely,    Ismael Juarez MD    Enclosure  CC:  No Recipients    If you would like to receive this communication electronically, please contact [email or web address] or [phone] to request more information on [employer] access.    For providers and/or their staff who would like to refer a patient to [facility], please contact us through our one-stop-shop provider referral line, [facility], at [phone].    If you feel you have received this communication in error or would no longer like to receive these types of communications, please e-mail [email or web address]

## 2019-02-13 NOTE — PROGRESS NOTES
History & Physical    SUBJECTIVE:     History of Present Illness:  Patient is a 63 y.o. female referred for port for chemotherapy. She was recently diagnosed with metastatic ovarian cancer with carcinomatosis and expected to undergo chemotherapy. She is right handed, no prior central line or port.    Chief Complaint   Patient presents with    Consult       Review of patient's allergies indicates:  No Known Allergies    Current Outpatient Medications   Medication Sig Dispense Refill    albuterol 90 mcg/actuation inhaler Inhale 2 puffs into the lungs every 4 (four) hours as needed for Wheezing. Rescue 18 g 0    multivitamin with minerals tablet Take 1 tablet by mouth once daily.       nabumetone (RELAFEN) 500 MG tablet Take 1 tablet (500 mg total) by mouth 2 (two) times daily. 60 tablet 1    olmesartan-hydrochlorothiazide (BENICAR HCT) 40-12.5 mg Tab Take 1 tablet by mouth once daily. 90 tablet 1    rosuvastatin (CRESTOR) 10 MG tablet Take 1 tablet (10 mg total) by mouth once daily. 90 tablet 1    oxyCODONE-acetaminophen (PERCOCET)  mg per tablet Take 1 tablet by mouth every 8 (eight) hours as needed. 20 tablet 0    zinc gluconate 50 mg tablet Take 50 mg by mouth once daily.       No current facility-administered medications for this visit.        Past Medical History:   Diagnosis Date    Anemia     Anxiety     Arthritis     knees    Cancer     ovarian    CHF (congestive heart failure)     Hyperlipemia     Hypertension     Neck pain      Past Surgical History:   Procedure Laterality Date    breast reduction  10/02/2018     SECTION      X 1    CYSTOSCOPY, WITH URETERAL STENT INSERTION Right 2019    [facility] OR    DILATION AND CURETTAGE OF UTERUS      HYSTERECTOMY      RALH for fibroids (still has ovaries)    PLACEMENT, TRANSOBTURATOR TAPE N/A 2014    [facility] at [facility] OR    PYELOGRAM, RETROGRADE Right  "2019    [facility] OR    ROBOTIC HYSTERECTOMY N/A 2014    [facility] OR    TUBAL LIGATION       Family History   Problem Relation Age of Onset    Anesthesia problems Other     Breast cancer Maternal Aunt     Breast cancer Paternal Aunt     Ovarian cancer Paternal Aunt     Colon cancer Brother     Thrombophilia Neg Hx      Social History     Tobacco Use    Smoking status: Former Smoker     Packs/day: 1.00     Years: 25.00     Pack years: 25.00     Last attempt to quit: 10/1/2018     Years since quittin.3    Smokeless tobacco: Never Used    Tobacco comment: States started quit 2 months ago after 30 years   Substance Use Topics    Alcohol use: Yes     Alcohol/week: 0.0 oz     Frequency: Never     Comment: occasionally  No alcohol 72h prior to sx    Drug use: No        Review of Systems:  Review of Systems   Constitutional: Negative for chills, fatigue, fever and unexpected weight change.   Respiratory: Negative for cough, shortness of breath, wheezing and stridor.    Cardiovascular: Negative for chest pain, palpitations and leg swelling.   Gastrointestinal: Negative for abdominal distention, abdominal pain, constipation, diarrhea, nausea and vomiting.   Genitourinary: Negative for difficulty urinating, dysuria, frequency, hematuria and urgency.   Skin: Negative for color change, pallor, rash and wound.   Hematological: Does not bruise/bleed easily.       OBJECTIVE:     Vital Signs (Most Recent)  Temp: 98.6 °F (37 °C) (19 1434)  Pulse: 90 (19 1434)  BP: 113/67 (19 1434)  5' 7" (1.702 m)  106.3 kg (234 lb 5.6 oz)     Physical Exam:  Physical Exam   Constitutional: She is oriented to person, place, and time. She appears well-developed and well-nourished.   HENT:   Head: Normocephalic and atraumatic.   Eyes: EOM are normal.   Neck: Neck supple.   Cardiovascular: Normal rate and regular rhythm. "   Pulmonary/Chest: Effort normal and breath sounds normal.   Abdominal: Soft. Bowel sounds are normal. She exhibits no distension. There is no tenderness.   Neurological: She is alert and oriented to person, place, and time.   Skin: Skin is warm and dry.   Vitals reviewed.          ASSESSMENT/PLAN:     64 y/o female with ovarian cancer/carcinomatosis    PLAN:Plan     Port placement 2/18/19  Preop: CBC, BMP; EKG  Risks and benefits discussed with patient including: Pain, bleeding, infection, injury to vessels, pneumothorax, need for further procedure

## 2019-02-13 NOTE — TELEPHONE ENCOUNTER
----- Message from Kirsten Cordero sent at 2/13/2019  3:25 PM CST -----  Contact: ELI Green   Nurse is calling regarding  requesting Pt Pathology report. .Ph#[phone] Fax#[phone]       ..Thank You  Leticia Cordero

## 2019-02-13 NOTE — H&P (VIEW-ONLY)
History & Physical    SUBJECTIVE:     History of Present Illness:  Patient is a 63 y.o. female referred for port for chemotherapy. She was recently diagnosed with metastatic ovarian cancer with carcinomatosis and expected to undergo chemotherapy. She is right handed, no prior central line or port.    Chief Complaint   Patient presents with    Consult       Review of patient's allergies indicates:  No Known Allergies    Current Outpatient Medications   Medication Sig Dispense Refill    albuterol 90 mcg/actuation inhaler Inhale 2 puffs into the lungs every 4 (four) hours as needed for Wheezing. Rescue 18 g 0    multivitamin with minerals tablet Take 1 tablet by mouth once daily.       nabumetone (RELAFEN) 500 MG tablet Take 1 tablet (500 mg total) by mouth 2 (two) times daily. 60 tablet 1    olmesartan-hydrochlorothiazide (BENICAR HCT) 40-12.5 mg Tab Take 1 tablet by mouth once daily. 90 tablet 1    rosuvastatin (CRESTOR) 10 MG tablet Take 1 tablet (10 mg total) by mouth once daily. 90 tablet 1    oxyCODONE-acetaminophen (PERCOCET)  mg per tablet Take 1 tablet by mouth every 8 (eight) hours as needed. 20 tablet 0    zinc gluconate 50 mg tablet Take 50 mg by mouth once daily.       No current facility-administered medications for this visit.        Past Medical History:   Diagnosis Date    Anemia     Anxiety     Arthritis     knees    Cancer     ovarian    CHF (congestive heart failure)     Hyperlipemia     Hypertension     Neck pain      Past Surgical History:   Procedure Laterality Date    breast reduction  10/02/2018     SECTION      X 1    CYSTOSCOPY, WITH URETERAL STENT INSERTION Right 2019    [facility] OR    DILATION AND CURETTAGE OF UTERUS      HYSTERECTOMY      RALH for fibroids (still has ovaries)    PLACEMENT, TRANSOBTURATOR TAPE N/A 2014    [facility] at [facility] OR    PYELOGRAM, RETROGRADE Right  "2019    [facility]    ROBOTIC HYSTERECTOMY N/A 2014    [facility] OR    TUBAL LIGATION       Family History   Problem Relation Age of Onset    Anesthesia problems Other     Breast cancer Maternal Aunt     Breast cancer Paternal Aunt     Ovarian cancer Paternal Aunt     Colon cancer Brother     Thrombophilia Neg Hx      Social History     Tobacco Use    Smoking status: Former Smoker     Packs/day: 1.00     Years: 25.00     Pack years: 25.00     Last attempt to quit: 10/1/2018     Years since quittin.3    Smokeless tobacco: Never Used    Tobacco comment: States started quit 2 months ago after 30 years   Substance Use Topics    Alcohol use: Yes     Alcohol/week: 0.0 oz     Frequency: Never     Comment: occasionally  No alcohol 72h prior to sx    Drug use: No        Review of Systems:  Review of Systems   Constitutional: Negative for chills, fatigue, fever and unexpected weight change.   Respiratory: Negative for cough, shortness of breath, wheezing and stridor.    Cardiovascular: Negative for chest pain, palpitations and leg swelling.   Gastrointestinal: Negative for abdominal distention, abdominal pain, constipation, diarrhea, nausea and vomiting.   Genitourinary: Negative for difficulty urinating, dysuria, frequency, hematuria and urgency.   Skin: Negative for color change, pallor, rash and wound.   Hematological: Does not bruise/bleed easily.       OBJECTIVE:     Vital Signs (Most Recent)  Temp: 98.6 °F (37 °C) (19 1434)  Pulse: 90 (19 1434)  BP: 113/67 (19 1434)  5' 7" (1.702 m)  106.3 kg (234 lb 5.6 oz)     Physical Exam:  Physical Exam   Constitutional: She is oriented to person, place, and time. She appears well-developed and well-nourished.   HENT:   Head: Normocephalic and atraumatic.   Eyes: EOM are normal.   Neck: Neck supple.   Cardiovascular: Normal rate and regular rhythm. "   Pulmonary/Chest: Effort normal and breath sounds normal.   Abdominal: Soft. Bowel sounds are normal. She exhibits no distension. There is no tenderness.   Neurological: She is alert and oriented to person, place, and time.   Skin: Skin is warm and dry.   Vitals reviewed.          ASSESSMENT/PLAN:     62 y/o female with ovarian cancer/carcinomatosis    PLAN:Plan     Port placement 2/18/19  Preop: CBC, BMP; EKG  Risks and benefits discussed with patient including: Pain, bleeding, infection, injury to vessels, pneumothorax, need for further procedure

## 2019-02-14 ENCOUNTER — TELEPHONE (OUTPATIENT)
Dept: HEMATOLOGY/ONCOLOGY | Facility: CLINIC | Age: 64
End: 2019-02-14

## 2019-02-14 DIAGNOSIS — C56.1 MALIGNANT NEOPLASM OF RIGHT OVARY: ICD-10-CM

## 2019-02-14 PROBLEM — Z01.810 PREOP CARDIOVASCULAR EXAM: Status: ACTIVE | Noted: 2019-02-14

## 2019-02-14 PROBLEM — R94.31 ABNORMAL ECG: Status: ACTIVE | Noted: 2019-02-14

## 2019-02-14 NOTE — TELEPHONE ENCOUNTER
Called pt and introduced myself and my role in the hem/onc dept.  Notified pt that we got recommendations back from Dr. Juarez regarding their appt and wanted to go over some other appts with her.  Explained to pt that I noticed her PET scan was changed to next Wednesday due to port placement on Monday.  Notified pt that I have rescheduled her appt with Dr. Trevizo to the following Thursday 2/21 for them to review PET results and discuss chemo recommendations.  Pt verbalized understanding of all information given and requested appts be emailed to her at [email or web address].  Will email appt schedule as requested.

## 2019-02-14 NOTE — PLAN OF CARE
START ON PATHWAY REGIMEN - Ovarian    Clinical Trial: Trial 2018.182: PCTP NRG- Phase I/II Study of Ruxolitinib   with Front-Line Neoadjuvant and Post-surgical Therapy in Patients with Advanced   Epithelial Ovarian, Fallopian Tube, or Primary Peritoneal Cancer    Patient Characteristics:  Newly Diagnosed, Neoadjuvant Therapy  Therapeutic Status: Newly Diagnosed  AJCC T Category: TX  AJCC N Category: NX  AJCC M Category: M1  AJCC 8 Stage Grouping: IV  BRCA Mutation Status: Awaiting Test Results  Intent of Therapy:  Non-Curative / Palliative Intent, Discussed with Patient

## 2019-02-15 ENCOUNTER — OFFICE VISIT (OUTPATIENT)
Dept: INTERNAL MEDICINE | Facility: CLINIC | Age: 64
End: 2019-02-15
Payer: COMMERCIAL

## 2019-02-15 ENCOUNTER — TELEPHONE (OUTPATIENT)
Dept: SURGERY | Facility: CLINIC | Age: 64
End: 2019-02-15

## 2019-02-15 VITALS
TEMPERATURE: 97 F | HEIGHT: 67 IN | DIASTOLIC BLOOD PRESSURE: 80 MMHG | SYSTOLIC BLOOD PRESSURE: 134 MMHG | BODY MASS INDEX: 36.95 KG/M2 | HEART RATE: 98 BPM | WEIGHT: 235.44 LBS | RESPIRATION RATE: 20 BRPM | OXYGEN SATURATION: 97 %

## 2019-02-15 DIAGNOSIS — R31.9 HEMATURIA, UNSPECIFIED TYPE: ICD-10-CM

## 2019-02-15 DIAGNOSIS — C56.9 MALIGNANT NEOPLASM OF OVARY, UNSPECIFIED LATERALITY: ICD-10-CM

## 2019-02-15 DIAGNOSIS — Z01.818 PRE-OP EVALUATION: Primary | ICD-10-CM

## 2019-02-15 DIAGNOSIS — Z86.79 HISTORY OF CHF (CONGESTIVE HEART FAILURE): ICD-10-CM

## 2019-02-15 PROBLEM — C56.1 MALIGNANT NEOPLASM OF RIGHT OVARY: Status: ACTIVE | Noted: 2019-02-15

## 2019-02-15 LAB
BACTERIA #/AREA URNS HPF: ABNORMAL /HPF
BILIRUB UR QL STRIP: NEGATIVE
CLARITY UR: ABNORMAL
COLOR UR: YELLOW
GLUCOSE UR QL STRIP: NEGATIVE
HGB UR QL STRIP: ABNORMAL
HYALINE CASTS #/AREA URNS LPF: ABNORMAL /LPF
KETONES UR QL STRIP: NEGATIVE
LEUKOCYTE ESTERASE UR QL STRIP: ABNORMAL
MICROSCOPIC COMMENT: ABNORMAL
NITRITE UR QL STRIP: NEGATIVE
NON-SQ EPI CELLS #/AREA URNS HPF: <1 /HPF
PH UR STRIP: 7.5 [PH] (ref 5–8)
PROT UR QL STRIP: ABNORMAL
RBC #/AREA URNS HPF: >100 /HPF (ref 0–4)
SP GR UR STRIP: 1.01 (ref 1–1.03)
SQUAMOUS #/AREA URNS HPF: 8 /HPF
URN SPEC COLLECT METH UR: ABNORMAL
WBC #/AREA URNS HPF: >100 /HPF (ref 0–5)

## 2019-02-15 PROCEDURE — 3079F PR MOST RECENT DIASTOLIC BLOOD PRESSURE 80-89 MM HG: ICD-10-PCS | Mod: CPTII,S$GLB,, | Performed by: FAMILY MEDICINE

## 2019-02-15 PROCEDURE — 99999 PR PBB SHADOW E&M-EST. PATIENT-LVL IV: ICD-10-PCS | Mod: PBBFAC,,, | Performed by: FAMILY MEDICINE

## 2019-02-15 PROCEDURE — 3008F BODY MASS INDEX DOCD: CPT | Mod: CPTII,S$GLB,, | Performed by: FAMILY MEDICINE

## 2019-02-15 PROCEDURE — 3075F PR MOST RECENT SYSTOLIC BLOOD PRESS GE 130-139MM HG: ICD-10-PCS | Mod: CPTII,S$GLB,, | Performed by: FAMILY MEDICINE

## 2019-02-15 PROCEDURE — 81000 URINALYSIS NONAUTO W/SCOPE: CPT

## 2019-02-15 PROCEDURE — 93000 ELECTROCARDIOGRAM COMPLETE: CPT | Mod: S$GLB,,, | Performed by: INTERNAL MEDICINE

## 2019-02-15 PROCEDURE — 93005 ELECTROCARDIOGRAM TRACING: CPT | Mod: S$GLB,,, | Performed by: FAMILY MEDICINE

## 2019-02-15 PROCEDURE — 99999 PR PBB SHADOW E&M-EST. PATIENT-LVL IV: CPT | Mod: PBBFAC,,, | Performed by: FAMILY MEDICINE

## 2019-02-15 PROCEDURE — 3075F SYST BP GE 130 - 139MM HG: CPT | Mod: CPTII,S$GLB,, | Performed by: FAMILY MEDICINE

## 2019-02-15 PROCEDURE — 87086 URINE CULTURE/COLONY COUNT: CPT

## 2019-02-15 PROCEDURE — 99214 PR OFFICE/OUTPT VISIT, EST, LEVL IV, 30-39 MIN: ICD-10-PCS | Mod: S$GLB,,, | Performed by: FAMILY MEDICINE

## 2019-02-15 PROCEDURE — 3079F DIAST BP 80-89 MM HG: CPT | Mod: CPTII,S$GLB,, | Performed by: FAMILY MEDICINE

## 2019-02-15 PROCEDURE — 3008F PR BODY MASS INDEX (BMI) DOCUMENTED: ICD-10-PCS | Mod: CPTII,S$GLB,, | Performed by: FAMILY MEDICINE

## 2019-02-15 PROCEDURE — 93005 EKG 12-LEAD: ICD-10-PCS | Mod: S$GLB,,, | Performed by: FAMILY MEDICINE

## 2019-02-15 PROCEDURE — 99214 OFFICE O/P EST MOD 30 MIN: CPT | Mod: S$GLB,,, | Performed by: FAMILY MEDICINE

## 2019-02-15 PROCEDURE — 93000 EKG 12-LEAD: ICD-10-PCS | Mod: S$GLB,,, | Performed by: INTERNAL MEDICINE

## 2019-02-15 RX ORDER — CIPROFLOXACIN 500 MG/1
500 TABLET ORAL EVERY 12 HOURS
Qty: 10 TABLET | Refills: 0 | Status: SHIPPED | OUTPATIENT
Start: 2019-02-15 | End: 2019-02-20

## 2019-02-15 NOTE — PROGRESS NOTES
Subjective:       Patient ID: Casie Gaines is a 63 y.o. female.    Chief Complaint: Pre-op Exam (Ekg)    HPI     64 yo F    Ovarian cancer  HTN  H/o CHF - uncertain of this diagnosis  HLD  H/o renal stent - placed out of concern of ovarian mass obstruction  Obesity  Knee OA  Anxiety    3 weeks ago was in Hospital  Large pleural effusion - found to have peritoneal carinomatosis   Reports felt 100% better after fluid removed.        Was told had heart failure -  8 years ago had pneumonia  At that visit they considered it heart failure      Pre-OP visit  Ovarian Cancer - Port placement surgery    Has had surgery in past:  1/30/19 - cystoscope - ureteral stent placed  10/18 - Breast reduction    Has never had issues with anesthesia    No history MI or stroke    Was at [facility] - In [facility]  For a 2nd opinion.      Port placement is set for Monday    No CP  No SOB    Does have some fatigue    Reviewed recent labs  Including BNP - 11 3 weeks ago    Reviewed urine  Some wbc/ rbc noted -  Does have stent - recently placed.    No dysuria  She was having spasms when urinating - no longer happening.  Improved with Detrol  Reviewed Culture from 3 days ago -  No significant growth reported.    Recent CXR  There has been interval clearing of prior infiltrate or atelectasis in the lower right lung.  There is blunting the left lateral costophrenic sulcus suggesting pleural thickening versus minimal pleural fluid.  Heart and mediastinal contours are stable.  No acute osseous abnormality.          Review of Systems   Constitutional: Negative for activity change.   HENT: Positive for rhinorrhea. Negative for hearing loss and trouble swallowing.    Eyes: Negative for discharge and visual disturbance.   Respiratory: Negative for chest tightness and wheezing.    Cardiovascular: Negative for chest pain and palpitations.   Gastrointestinal: Negative for blood in stool, constipation, diarrhea and vomiting.   Endocrine:  Negative for polydipsia and polyuria.   Genitourinary: Negative for difficulty urinating, dysuria, hematuria and menstrual problem.   Musculoskeletal: Positive for arthralgias. Negative for joint swelling and neck pain.   Neurological: Positive for headaches. Negative for weakness.   Psychiatric/Behavioral: Positive for dysphoric mood. Negative for confusion.       Objective:       Vitals:    02/15/19 0837   BP: 134/80   Pulse: 98   Resp: 20   Temp: 96.8 °F (36 °C)       Physical Exam   Constitutional: She is oriented to person, place, and time. She appears well-developed and well-nourished. She does not have a sickly appearance. No distress.   HENT:   Head: Normocephalic and atraumatic.   Right Ear: External ear normal.   Left Ear: External ear normal.   Eyes: Conjunctivae, EOM and lids are normal.   Neck: Trachea normal, normal range of motion and full passive range of motion without pain.   Cardiovascular: Normal rate, regular rhythm and normal heart sounds.   Pulmonary/Chest: Effort normal and breath sounds normal. No stridor. No respiratory distress.   Abdominal: Soft. Normal appearance and bowel sounds are normal. She exhibits no distension. There is no tenderness. There is no guarding. No hernia.   Musculoskeletal: Normal range of motion.   Lymphadenopathy:     She has no cervical adenopathy.   Neurological: She is alert and oriented to person, place, and time. She is not disoriented.   Skin: Skin is warm, dry and intact. No rash noted. She is not diaphoretic.   Psychiatric: She has a normal mood and affect. Her speech is normal and behavior is normal. Thought content normal.       Assessment:       1. Pre-op evaluation    2. Malignant neoplasm of ovary, unspecified laterality    3. Hematuria, unspecified type    4. History of CHF (congestive heart failure)        Plan:   Pre-op evaluation  EKG obtained - no acute concerns  Clearing for surgery      Malignant neoplasm of ovary, unspecified laterality  2nd  opinion at [facility]  Awaiting discussions between oncologist for post port plannng    Ureteral stent  Suspect cause of WBC  Obtaining urine again just to assess for improvement  I do not think will hold up from port placement given culture negative   Post visit  - noted wbc / rbc in urine  Placed on cipro to err on side of caution.  Tried calling patient twice.  Went upstairs and left note with Dr. Estrada nurse to give to patient - as I knew she had an appt there.     Uncertain of history of CHF  Will need further evaluation  Prior to pleural effusion was clinical without issue -   No issues with fatigue/sob/ leg swelling/ flat at night -   I would obtain an echo in future to assess function    No Follow-up on file.

## 2019-02-15 NOTE — TELEPHONE ENCOUNTER
Spoke to patient she was advised that per Dr. Machuca's note that she can take her antibiotics the morning of surgery with a sip pf water and that she can go forward with her surgery.patient voiced understanding.

## 2019-02-15 NOTE — TELEPHONE ENCOUNTER
----- Message from Josue Reyna sent at 2/15/2019  4:00 PM CST -----  Contact: pt  She's calling in regards to her instructions prior to her procedure, pls again, [phone] (home)

## 2019-02-17 ENCOUNTER — ANESTHESIA EVENT (OUTPATIENT)
Dept: SURGERY | Facility: HOSPITAL | Age: 64
End: 2019-02-17
Payer: COMMERCIAL

## 2019-02-17 LAB
BACTERIA UR CULT: NORMAL
BACTERIA UR CULT: NORMAL

## 2019-02-18 ENCOUNTER — ANESTHESIA (OUTPATIENT)
Dept: SURGERY | Facility: HOSPITAL | Age: 64
End: 2019-02-18
Payer: COMMERCIAL

## 2019-02-18 ENCOUNTER — TELEPHONE (OUTPATIENT)
Dept: SURGERY | Facility: CLINIC | Age: 64
End: 2019-02-18

## 2019-02-18 ENCOUNTER — TELEPHONE (OUTPATIENT)
Dept: RADIOLOGY | Facility: HOSPITAL | Age: 64
End: 2019-02-18

## 2019-02-18 ENCOUNTER — HOSPITAL ENCOUNTER (OUTPATIENT)
Facility: HOSPITAL | Age: 64
Discharge: HOME | End: 2019-02-18
Attending: SURGERY | Admitting: SURGERY
Payer: COMMERCIAL

## 2019-02-18 DIAGNOSIS — J90 PLEURAL EFFUSION ON RIGHT: ICD-10-CM

## 2019-02-18 DIAGNOSIS — C56.9 OVARIAN CANCER: ICD-10-CM

## 2019-02-18 DIAGNOSIS — C78.6 PERITONEAL CARCINOMATOSIS: ICD-10-CM

## 2019-02-18 PROCEDURE — 36000706: Performed by: SURGERY

## 2019-02-18 PROCEDURE — 25000003 PHARM REV CODE 250: Performed by: SURGERY

## 2019-02-18 PROCEDURE — 63600175 PHARM REV CODE 636 W HCPCS: Performed by: SURGERY

## 2019-02-18 PROCEDURE — 37000008 HC ANESTHESIA 1ST 15 MINUTES: Performed by: SURGERY

## 2019-02-18 PROCEDURE — 71000015 HC POSTOP RECOV 1ST HR: Performed by: SURGERY

## 2019-02-18 PROCEDURE — C1788 PORT, INDWELLING, IMP: HCPCS | Performed by: SURGERY

## 2019-02-18 PROCEDURE — 63600175 PHARM REV CODE 636 W HCPCS: Performed by: NURSE ANESTHETIST, CERTIFIED REGISTERED

## 2019-02-18 PROCEDURE — 37000009 HC ANESTHESIA EA ADD 15 MINS: Performed by: SURGERY

## 2019-02-18 PROCEDURE — 77001 FLUOROGUIDE FOR VEIN DEVICE: CPT | Mod: 26,,, | Performed by: SURGERY

## 2019-02-18 PROCEDURE — 36000707: Performed by: SURGERY

## 2019-02-18 PROCEDURE — 36561 INSERT TUNNELED CV CATH: CPT | Mod: LT,,, | Performed by: SURGERY

## 2019-02-18 PROCEDURE — 77001 CHG FLUOROGUIDE CNTRL VEN ACCESS,PLACE,REPLACE,REMOVE: ICD-10-PCS | Mod: 26,,, | Performed by: SURGERY

## 2019-02-18 PROCEDURE — 25000003 PHARM REV CODE 250: Performed by: NURSE ANESTHETIST, CERTIFIED REGISTERED

## 2019-02-18 PROCEDURE — 36561 PR INSERT TUNNELED CV CATH WITH PORT: ICD-10-PCS | Mod: LT,,, | Performed by: SURGERY

## 2019-02-18 PROCEDURE — 71000033 HC RECOVERY, INTIAL HOUR: Performed by: SURGERY

## 2019-02-18 PROCEDURE — 25000003 PHARM REV CODE 250: Performed by: ANESTHESIOLOGY

## 2019-02-18 DEVICE — PORT POWER CLEAR VIEW: Type: IMPLANTABLE DEVICE | Site: CHEST | Status: FUNCTIONAL

## 2019-02-18 RX ORDER — LIDOCAINE HYDROCHLORIDE 10 MG/ML
1 INJECTION, SOLUTION EPIDURAL; INFILTRATION; INTRACAUDAL; PERINEURAL ONCE
Status: DISCONTINUED | OUTPATIENT
Start: 2019-02-18 | End: 2019-02-18 | Stop reason: HOSPADM

## 2019-02-18 RX ORDER — SODIUM CHLORIDE 0.9 % (FLUSH) 0.9 %
3 SYRINGE (ML) INJECTION EVERY 8 HOURS
Status: DISCONTINUED | OUTPATIENT
Start: 2019-02-18 | End: 2019-02-18 | Stop reason: HOSPADM

## 2019-02-18 RX ORDER — MIDAZOLAM HYDROCHLORIDE 1 MG/ML
INJECTION, SOLUTION INTRAMUSCULAR; INTRAVENOUS
Status: DISCONTINUED | OUTPATIENT
Start: 2019-02-18 | End: 2019-02-18

## 2019-02-18 RX ORDER — ONDANSETRON 2 MG/ML
4 INJECTION INTRAMUSCULAR; INTRAVENOUS DAILY PRN
Status: DISCONTINUED | OUTPATIENT
Start: 2019-02-18 | End: 2019-02-18 | Stop reason: HOSPADM

## 2019-02-18 RX ORDER — MORPHINE SULFATE 4 MG/ML
2 INJECTION, SOLUTION INTRAMUSCULAR; INTRAVENOUS EVERY 5 MIN PRN
Status: DISCONTINUED | OUTPATIENT
Start: 2019-02-18 | End: 2019-02-18 | Stop reason: HOSPADM

## 2019-02-18 RX ORDER — SODIUM CHLORIDE 0.9 % (FLUSH) 0.9 %
3 SYRINGE (ML) INJECTION
Status: DISCONTINUED | OUTPATIENT
Start: 2019-02-18 | End: 2019-02-18 | Stop reason: HOSPADM

## 2019-02-18 RX ORDER — SODIUM CHLORIDE 9 MG/ML
INJECTION, SOLUTION INTRAVENOUS CONTINUOUS
Status: DISCONTINUED | OUTPATIENT
Start: 2019-02-18 | End: 2019-02-18 | Stop reason: HOSPADM

## 2019-02-18 RX ORDER — CEFAZOLIN SODIUM 2 G/50ML
2 SOLUTION INTRAVENOUS
Status: COMPLETED | OUTPATIENT
Start: 2019-02-18 | End: 2019-02-18

## 2019-02-18 RX ORDER — ACETAMINOPHEN 10 MG/ML
INJECTION, SOLUTION INTRAVENOUS
Status: DISCONTINUED | OUTPATIENT
Start: 2019-02-18 | End: 2019-02-18

## 2019-02-18 RX ORDER — HYDROCODONE BITARTRATE AND ACETAMINOPHEN 10; 325 MG/1; MG/1
1 TABLET ORAL EVERY 4 HOURS PRN
Status: DISCONTINUED | OUTPATIENT
Start: 2019-02-18 | End: 2019-02-18 | Stop reason: HOSPADM

## 2019-02-18 RX ORDER — LIDOCAINE HYDROCHLORIDE 10 MG/ML
INJECTION INFILTRATION; PERINEURAL
Status: DISCONTINUED | OUTPATIENT
Start: 2019-02-18 | End: 2019-02-18

## 2019-02-18 RX ORDER — LIDOCAINE HYDROCHLORIDE 10 MG/ML
INJECTION, SOLUTION EPIDURAL; INFILTRATION; INTRACAUDAL; PERINEURAL
Status: DISCONTINUED | OUTPATIENT
Start: 2019-02-18 | End: 2019-02-18 | Stop reason: HOSPADM

## 2019-02-18 RX ORDER — HYDROCODONE BITARTRATE AND ACETAMINOPHEN 5; 325 MG/1; MG/1
1 TABLET ORAL EVERY 6 HOURS PRN
Qty: 10 TABLET | Refills: 0 | Status: ON HOLD | OUTPATIENT
Start: 2019-02-18 | End: 2019-08-15 | Stop reason: HOSPADM

## 2019-02-18 RX ORDER — HYDROMORPHONE HYDROCHLORIDE 2 MG/ML
0.2 INJECTION, SOLUTION INTRAMUSCULAR; INTRAVENOUS; SUBCUTANEOUS EVERY 5 MIN PRN
Status: DISCONTINUED | OUTPATIENT
Start: 2019-02-18 | End: 2019-02-18 | Stop reason: HOSPADM

## 2019-02-18 RX ORDER — KETOROLAC TROMETHAMINE 30 MG/ML
INJECTION, SOLUTION INTRAMUSCULAR; INTRAVENOUS
Status: DISCONTINUED | OUTPATIENT
Start: 2019-02-18 | End: 2019-02-18

## 2019-02-18 RX ORDER — PROPOFOL 10 MG/ML
VIAL (ML) INTRAVENOUS
Status: DISCONTINUED | OUTPATIENT
Start: 2019-02-18 | End: 2019-02-18

## 2019-02-18 RX ORDER — FENTANYL CITRATE 50 UG/ML
INJECTION, SOLUTION INTRAMUSCULAR; INTRAVENOUS
Status: DISCONTINUED | OUTPATIENT
Start: 2019-02-18 | End: 2019-02-18

## 2019-02-18 RX ORDER — HYDROCODONE BITARTRATE AND ACETAMINOPHEN 5; 325 MG/1; MG/1
1 TABLET ORAL EVERY 4 HOURS PRN
Status: DISCONTINUED | OUTPATIENT
Start: 2019-02-18 | End: 2019-02-18 | Stop reason: HOSPADM

## 2019-02-18 RX ORDER — ONDANSETRON 2 MG/ML
INJECTION INTRAMUSCULAR; INTRAVENOUS
Status: DISCONTINUED | OUTPATIENT
Start: 2019-02-18 | End: 2019-02-18

## 2019-02-18 RX ORDER — ACETAMINOPHEN 500 MG
1000 TABLET ORAL
Status: COMPLETED | OUTPATIENT
Start: 2019-02-18 | End: 2019-02-18

## 2019-02-18 RX ORDER — BUPIVACAINE HYDROCHLORIDE 2.5 MG/ML
INJECTION, SOLUTION EPIDURAL; INFILTRATION; INTRACAUDAL
Status: DISCONTINUED | OUTPATIENT
Start: 2019-02-18 | End: 2019-02-18 | Stop reason: HOSPADM

## 2019-02-18 RX ORDER — HEPARIN 100 UNIT/ML
SYRINGE INTRAVENOUS
Status: DISCONTINUED | OUTPATIENT
Start: 2019-02-18 | End: 2019-02-18 | Stop reason: HOSPADM

## 2019-02-18 RX ORDER — SODIUM CHLORIDE, SODIUM LACTATE, POTASSIUM CHLORIDE, CALCIUM CHLORIDE 600; 310; 30; 20 MG/100ML; MG/100ML; MG/100ML; MG/100ML
INJECTION, SOLUTION INTRAVENOUS CONTINUOUS
Status: DISCONTINUED | OUTPATIENT
Start: 2019-02-18 | End: 2019-02-18 | Stop reason: HOSPADM

## 2019-02-18 RX ADMIN — PROPOFOL 50 MG: 10 INJECTION, EMULSION INTRAVENOUS at 07:02

## 2019-02-18 RX ADMIN — CEFAZOLIN SODIUM 2 G: 2 SOLUTION INTRAVENOUS at 07:02

## 2019-02-18 RX ADMIN — PROPOFOL 20 MG: 10 INJECTION, EMULSION INTRAVENOUS at 07:02

## 2019-02-18 RX ADMIN — KETOROLAC TROMETHAMINE 30 MG: 30 INJECTION, SOLUTION INTRAMUSCULAR; INTRAVENOUS at 08:02

## 2019-02-18 RX ADMIN — ACETAMINOPHEN 1000 MG: 10 INJECTION, SOLUTION INTRAVENOUS at 08:02

## 2019-02-18 RX ADMIN — ONDANSETRON 4 MG: 2 INJECTION, SOLUTION INTRAMUSCULAR; INTRAVENOUS at 07:02

## 2019-02-18 RX ADMIN — MIDAZOLAM 4 MG: 1 INJECTION INTRAMUSCULAR; INTRAVENOUS at 06:02

## 2019-02-18 RX ADMIN — FENTANYL CITRATE 100 MCG: 50 INJECTION, SOLUTION INTRAMUSCULAR; INTRAVENOUS at 07:02

## 2019-02-18 RX ADMIN — SODIUM CHLORIDE: 0.9 INJECTION, SOLUTION INTRAVENOUS at 06:02

## 2019-02-18 RX ADMIN — ACETAMINOPHEN 1000 MG: 500 TABLET ORAL at 06:02

## 2019-02-18 RX ADMIN — LIDOCAINE HYDROCHLORIDE 40 MG: 10 INJECTION, SOLUTION INFILTRATION; PERINEURAL at 07:02

## 2019-02-18 NOTE — TELEPHONE ENCOUNTER
----- Message from Nathalia Mustafa sent at 2/18/2019 10:15 AM CST -----  Contact: patient nicole Rock  States she had surgery today and they forgot to put the nasal antibiotic in her bag. Please call patient today @ [phone]. The procedure was done 2 the hospital. Thanks, artis

## 2019-02-18 NOTE — BRIEF OP NOTE
[facility]  Brief Operative Note     SUMMARY     Surgery Date: 2/18/2019     Surgeon(s) and Role:     * Ulisses Monzon MD - Primary    Assisting Surgeon: None    Pre-op Diagnosis:  Peritoneal carcinomatosis [C78.6, C80.1]    Post-op Diagnosis:  Post-Op Diagnosis Codes:     * Peritoneal carcinomatosis [C78.6, C80.1]    Procedure(s) (LRB):  INSERTION, VENOUS ACCESS PORT (Left)    Anesthesia: General    Description of the findings of the procedure:  Port placement    Findings/Key Components:  See op note    Estimated Blood Loss: * No values recorded between 2/18/2019  7:20 AM and 2/18/2019  8:14 AM *         Specimens:   Specimen (12h ago, onward)    None          Discharge Note    SUMMARY     Admit Date: 2/18/2019    Discharge Date and Time:  02/18/2019 8:15 AM    Hospital Course patient underwent port placement was discharged postoperatively    Final Diagnosis: Post-Op Diagnosis Codes:     * Peritoneal carcinomatosis [C78.6, C80.1]    Disposition: Home or Self Care    Follow Up/Patient Instructions:     Medications:  Reconciled Home Medications:      Medication List      START taking these medications    HYDROcodone-acetaminophen 5-325 mg per tablet  Commonly known as:  NORCO  Take 1 tablet by mouth every 6 (six) hours as needed for Pain.        CONTINUE taking these medications    albuterol 90 mcg/actuation inhaler  Commonly known as:  PROVENTIL/VENTOLIN HFA  Inhale 2 puffs into the lungs every 4 (four) hours as needed for Wheezing. Rescue     ciprofloxacin HCl 500 MG tablet  Commonly known as:  CIPRO  Take 1 tablet (500 mg total) by mouth every 12 (twelve) hours. for 5 days     multivitamin with minerals tablet  Take 1 tablet by mouth once daily.     nabumetone 500 MG tablet  Commonly known as:  RELAFEN  Take 1 tablet (500 mg total) by mouth 2 (two) times daily.     olmesartan-hydrochlorothiazide 40-12.5 mg Tab  Commonly known as:  BENICAR HCT  Take 1 tablet by mouth once daily.      oxyCODONE-acetaminophen  mg per tablet  Commonly known as:  PERCOCET  Take 1 tablet by mouth every 8 (eight) hours as needed.     rosuvastatin 10 MG tablet  Commonly known as:  CRESTOR  Take 1 tablet (10 mg total) by mouth once daily.     tolterodine 2 MG Tab  Commonly known as:  DETROL  Take 1 tablet (2 mg total) by mouth 2 (two) times daily.        ASK your doctor about these medications    zinc gluconate 50 mg tablet  Take 50 mg by mouth once daily.          Discharge Procedure Orders   Diet general     Ice to affected area     Call MD for:  temperature >100.4     Call MD for:  persistent nausea and vomiting     Call MD for:  severe uncontrolled pain     Call MD for:  difficulty breathing, headache or visual disturbances     Call MD for:  redness, tenderness, or signs of infection (pain, swelling, redness, odor or green/yellow discharge around incision site)     Call MD for:  hives     Call MD for:  persistent dizziness or light-headedness     Call MD for:  extreme fatigue     Remove dressing in 48 hours     Activity as tolerated     Shower on day dressing removed (No bath)     Follow-up Information     Ulisses Monzon MD.    Specialties:  General Surgery, Bariatrics  Why:  As needed  Contact information:  [address] [place] [address] DR Rupa HODGSON [id number] [phone]

## 2019-02-18 NOTE — INTERVAL H&P NOTE
The patient has been examined and the H&P has been reviewed:    I concur with the findings and no changes have occurred since H&P was written.    Anesthesia/Surgery risks, benefits and alternative options discussed and understood by patient/family.          Active Hospital Problems    Diagnosis  POA    Ovarian cancer [C56.9]  Yes      Resolved Hospital Problems   No resolved problems to display.

## 2019-02-18 NOTE — OP NOTE
DATE: 02/18/2019    PREOPERATIVE DIAGNOSIS:  Ovarian cancer    POSTOPERATIVE DIAGNOSIS:  Same    PROCEDURE PERFORMED:  Left internal jugular MediPort placement.   Ultrasound-guided venous access    ATTENDING SURGEON: Ulisses Monzon    ANESTHESIA: Monitored anesthesia care plus local.     ESTIMATED BLOOD LOSS: 10 mL     FINDINGS: A left internal jugular  port placed with tip at junction of superior   vena cava and right atrium.     SPECIMEN: None.     DRAINS: None.     COMPLICATIONS: None.      OPERATIVE PROCEDURE: The patient was identified in Preoperative Holding,   brought back to the Operating Room, and placed supine on the operating table   and padded appropriately. Monitors were applied. Monitored anesthesia care   was initiated. The left chest was prepped and draped in the standard sterile   surgical fashion. A timeout was performed and all team members present agreed   this was the correct procedure on the correct patient. We also confirmed   administration of appropriate preoperative antibiotics.     After administration of appropriate local anesthesia, the left subclavian vein was attempted to be cannulated however were unsuccessful to access the vessel. The left internal jugular vein was then cannulated under ultrasound guidance using a hollow bore needle. A guidewire was placed and confirmed to be in correct position by fluoroscopy. An appropriate area for the pocket was chosen, and the incision was anesthetized with lidocaine. A 4 cm transverse skin incision was made. Subcutaneous tissue was divided with Bovie electrocautery.  The   catheter was measured for length appropriately and cut. Additional local was   administered for the pocket for the port and then the port pocket was created   with a mixture of Bovie electrocautery and blunt dissection. The port was tunneled from the incision to the cannulation site with the tunneling device. The port was secured to the investing pectoral fascia with two 3 0  Vicryl sutures. Under fluoroscopic guidance, the split sheath and dilator were placed over the guidewire, and the guidewire and dilator were then removed. The catheter was placed down the split sheath and the sheath was split and peeled away. Fluoroscopy confirmed appropriate position of the catheter, which aspirated and flushed easily. Heparinized saline was instilled in the catheter. The skin incision was closed in layers with deep buried interrupted 3-0 Vicryl and  running subcuticular 4-0 Monocryl. The cannulation incision was closed with a buried interrupted 4-0 Monocryl stitch. A sterile dressing was applied. The patient was transported to the Recovery Room in stable condition. All sponge, instrument and needle counts were correct at the end of the procedure. I was present and scrubbed for the entire case.

## 2019-02-18 NOTE — ANESTHESIA POSTPROCEDURE EVALUATION
"Anesthesia Post Evaluation    Patient: Casie Gaines    Procedure(s) Performed: Procedure(s) (LRB):  INSERTION, VENOUS ACCESS PORT (Left)    Final Anesthesia Type: MAC  Patient location during evaluation: PACU  Level of consciousness: awake and alert  Post-procedure vital signs: reviewed and stable  Pain management: adequate  Airway patency: patent    Anesthetic complications: no      Cardiovascular status: blood pressure returned to baseline  Respiratory status: unassisted  Hydration status: euvolemic  Follow-up not needed.        Visit Vitals  /75   Pulse 75   Temp 36.6 °C (97.8 °F) (Temporal)   Resp 15   Ht 5' 7" (1.702 m)   Wt 106.1 kg (233 lb 14.5 oz)   SpO2 96%   Breastfeeding? No   BMI 36.64 kg/m²       Pain/Kym Score: Pain Rating Prior to Med Admin: 3 (2/18/2019  6:37 AM)  Kym Score: 10 (2/18/2019  9:00 AM)        "

## 2019-02-18 NOTE — TRANSFER OF CARE
"Anesthesia Transfer of Care Note    Patient: Casie Gaines    Procedure(s) Performed: Procedure(s) (LRB):  INSERTION, VENOUS ACCESS PORT (Left)    Patient location: PACU    Anesthesia Type: MAC    Transport from OR: Transported from OR on room air with adequate spontaneous ventilation    Post pain: adequate analgesia    Post assessment: no apparent anesthetic complications    Post vital signs: stable    Level of consciousness: awake    Nausea/Vomiting: no nausea/vomiting    Complications: none    Transfer of care protocol was followed      Last vitals:   Visit Vitals  /81   Pulse 80   Temp 36.5 °C (97.7 °F) (Temporal)   Resp 18   Ht 5' 7" (1.702 m)   Wt 106.1 kg (233 lb 14.5 oz)   SpO2 95%   Breastfeeding? No   BMI 36.64 kg/m²     "

## 2019-02-18 NOTE — ANESTHESIA PREPROCEDURE EVALUATION
02/18/2019  Casie Gaines is a 63 y.o., female.    Anesthesia Evaluation    I have reviewed the Patient Summary Reports.        Review of Systems  Anesthesia Hx:  No problems with previous Anesthesia  History of prior surgery of interest to airway management or planning: Denies Family Hx of Anesthesia complications.   Denies Personal Hx of Anesthesia complications.   Hematology/Oncology:         -- Anemia: Current/Recent Cancer. Other (see Oncology comments) chemotherapy   Cardiovascular:   Exercise tolerance: poor Hypertension, well controlled CHF  Functional Capacity low / < 4 METS  Congestive Heart Failure (CHF)  Hypertension, Essential Hypertension    Pulmonary:   Asthma mild Malignant pleural effusion s/p thoracentesis   Renal/:   Chronic Renal Disease Right hydronephrosis s/p IVp and stent   Musculoskeletal:   Arthritis     OB/GYN/PEDS:  Stage IV ovarian CA   Neurological:  Neurology Normal    Endocrine:  Denies Diabetes  Denies Thyroid Disease    Psych:  Psychiatric Normal           Physical Exam  General:  Well nourished    Airway/Jaw/Neck:  Airway Findings: Mouth Opening: Normal Tongue: Normal  General Airway Assessment: Adult  Mallampati: II       Chest/Lungs:  Chest/Lungs Findings: Clear to auscultation     Heart/Vascular:  Heart Findings: Rate: Normal        Mental Status:  Mental Status Findings:  Cooperative, Alert and Oriented         Anesthesia Plan  Type of Anesthesia, risks & benefits discussed:  Anesthesia Type:  MAC  Patient's Preference:   Intra-op Monitoring Plan: standard ASA monitors  Intra-op Monitoring Plan Comments:   Post Op Pain Control Plan:   Post Op Pain Control Plan Comments:   Induction:    Beta Blocker:  Patient is not currently on a Beta-Blocker (No further documentation required).       Informed Consent: Patient understands risks and agrees with Anesthesia plan.   Questions answered. Anesthesia consent signed with patient.  ASA Score: 4     Day of Surgery Review of History & Physical:        Anesthesia Plan Notes: Abdominal exam benign without appreciable ascites and patient denies reflux symptoms.  Pt able to tolerate supine position.  CXR improved after thoracentesis.  TTE normal per pt.  Appropriate MAC candidate.         Ready For Surgery From Anesthesia Perspective.

## 2019-02-18 NOTE — TELEPHONE ENCOUNTER
Patient is aware that she does not need it. She states that someone is already on there way to the hospital to pick it up

## 2019-02-19 ENCOUNTER — TELEPHONE (OUTPATIENT)
Dept: INTERNAL MEDICINE | Facility: CLINIC | Age: 64
End: 2019-02-19

## 2019-02-19 NOTE — TELEPHONE ENCOUNTER
Called the patient about FMLA paperwork will call the patient to pick the paperwork up as soon as its complete. /sl/

## 2019-02-19 NOTE — TELEPHONE ENCOUNTER
----- Message from Sharmin Packer sent at 2/19/2019  1:28 PM CST -----  needs callback regarding status of [facility] paperwork..[phone] (home)

## 2019-02-19 NOTE — TELEPHONE ENCOUNTER
----- Message from Sharmin Packer sent at 2/19/2019  1:28 PM CST -----  needs callback regarding status of [facility] paperwork..[phone] (home)

## 2019-02-20 ENCOUNTER — TELEPHONE (OUTPATIENT)
Dept: INTERNAL MEDICINE | Facility: CLINIC | Age: 64
End: 2019-02-20

## 2019-02-20 ENCOUNTER — HOSPITAL ENCOUNTER (OUTPATIENT)
Dept: RADIOLOGY | Facility: HOSPITAL | Age: 64
Discharge: HOME | End: 2019-02-20
Attending: INTERNAL MEDICINE
Payer: COMMERCIAL

## 2019-02-20 DIAGNOSIS — C78.6 PERITONEAL CARCINOMATOSIS: ICD-10-CM

## 2019-02-20 DIAGNOSIS — C56.9 MALIGNANT NEOPLASM OF OVARY, UNSPECIFIED LATERALITY: ICD-10-CM

## 2019-02-20 PROCEDURE — 78815 PET IMAGE W/CT SKULL-THIGH: CPT | Mod: 26,PS,, | Performed by: RADIOLOGY

## 2019-02-20 PROCEDURE — A9552 F18 FDG: HCPCS

## 2019-02-20 PROCEDURE — 78815 PET IMAGE W/CT SKULL-THIGH: CPT | Mod: TC

## 2019-02-20 PROCEDURE — 78815 NM PET CT ROUTINE: ICD-10-PCS | Mod: 26,PS,, | Performed by: RADIOLOGY

## 2019-02-20 NOTE — TELEPHONE ENCOUNTER
----- Message from Olinda Garcias sent at 2/20/2019  3:06 PM CST -----  Contact: pt   Type:  Patient Returning Call    Who Called:REAL HERNANDEZ   Who Left Message for Patient:  Does the patient know what this is regarding?: paperwork   Would the patient rather a call back or a response via [facility]? Callback   Best Call Back Number: [phone] ([place])   Additional Information: Pt is requesting a call back from the nurse in regards to the pt wanting to know if she could e-mail the paper work over to the office

## 2019-02-20 NOTE — TELEPHONE ENCOUNTER
Returned call to patient. Patient sent over copy of [place] paperwork to be completed and will  on 02/21/19 at visit.

## 2019-02-21 ENCOUNTER — OFFICE VISIT (OUTPATIENT)
Dept: HEMATOLOGY/ONCOLOGY | Facility: CLINIC | Age: 64
End: 2019-02-21
Payer: COMMERCIAL

## 2019-02-21 ENCOUNTER — OFFICE VISIT (OUTPATIENT)
Dept: UROLOGY | Facility: CLINIC | Age: 64
End: 2019-02-21
Payer: COMMERCIAL

## 2019-02-21 VITALS
TEMPERATURE: 98 F | OXYGEN SATURATION: 98 % | BODY MASS INDEX: 37.23 KG/M2 | SYSTOLIC BLOOD PRESSURE: 140 MMHG | DIASTOLIC BLOOD PRESSURE: 75 MMHG | RESPIRATION RATE: 18 BRPM | SYSTOLIC BLOOD PRESSURE: 126 MMHG | RESPIRATION RATE: 15 BRPM | HEART RATE: 75 BPM | HEIGHT: 67 IN | BODY MASS INDEX: 36.72 KG/M2 | WEIGHT: 237.19 LBS | DIASTOLIC BLOOD PRESSURE: 84 MMHG | OXYGEN SATURATION: 96 % | HEIGHT: 67 IN | WEIGHT: 233.94 LBS | TEMPERATURE: 98 F | HEART RATE: 89 BPM

## 2019-02-21 VITALS
BODY MASS INDEX: 37.23 KG/M2 | HEART RATE: 89 BPM | WEIGHT: 237.19 LBS | SYSTOLIC BLOOD PRESSURE: 158 MMHG | HEIGHT: 67 IN | DIASTOLIC BLOOD PRESSURE: 82 MMHG

## 2019-02-21 DIAGNOSIS — H66.91 RIGHT OTITIS MEDIA, UNSPECIFIED OTITIS MEDIA TYPE: ICD-10-CM

## 2019-02-21 DIAGNOSIS — C78.6 PERITONEAL CARCINOMATOSIS: Primary | ICD-10-CM

## 2019-02-21 DIAGNOSIS — C80.0 CARCINOMATOSIS: Primary | ICD-10-CM

## 2019-02-21 PROCEDURE — 3077F SYST BP >= 140 MM HG: CPT | Mod: CPTII,S$GLB,, | Performed by: INTERNAL MEDICINE

## 2019-02-21 PROCEDURE — 3079F DIAST BP 80-89 MM HG: CPT | Mod: CPTII,S$GLB,, | Performed by: INTERNAL MEDICINE

## 2019-02-21 PROCEDURE — 99999 PR PBB SHADOW E&M-EST. PATIENT-LVL III: CPT | Mod: PBBFAC,,, | Performed by: INTERNAL MEDICINE

## 2019-02-21 PROCEDURE — 3008F PR BODY MASS INDEX (BMI) DOCUMENTED: ICD-10-PCS | Mod: CPTII,S$GLB,, | Performed by: INTERNAL MEDICINE

## 2019-02-21 PROCEDURE — 3008F BODY MASS INDEX DOCD: CPT | Mod: CPTII,S$GLB,, | Performed by: INTERNAL MEDICINE

## 2019-02-21 PROCEDURE — 99999 PR PBB SHADOW E&M-EST. PATIENT-LVL III: ICD-10-PCS | Mod: PBBFAC,,, | Performed by: UROLOGY

## 2019-02-21 PROCEDURE — 99215 OFFICE O/P EST HI 40 MIN: CPT | Mod: S$GLB,,, | Performed by: INTERNAL MEDICINE

## 2019-02-21 PROCEDURE — 3077F PR MOST RECENT SYSTOLIC BLOOD PRESSURE >= 140 MM HG: ICD-10-PCS | Mod: CPTII,S$GLB,, | Performed by: INTERNAL MEDICINE

## 2019-02-21 PROCEDURE — 99999 PR PBB SHADOW E&M-EST. PATIENT-LVL III: ICD-10-PCS | Mod: PBBFAC,,, | Performed by: INTERNAL MEDICINE

## 2019-02-21 PROCEDURE — 99999 PR PBB SHADOW E&M-EST. PATIENT-LVL III: CPT | Mod: PBBFAC,,, | Performed by: UROLOGY

## 2019-02-21 PROCEDURE — 99215 PR OFFICE/OUTPT VISIT, EST, LEVL V, 40-54 MIN: ICD-10-PCS | Mod: S$GLB,,, | Performed by: INTERNAL MEDICINE

## 2019-02-21 PROCEDURE — 99024 PR POST-OP FOLLOW-UP VISIT: ICD-10-PCS | Mod: S$GLB,,, | Performed by: UROLOGY

## 2019-02-21 PROCEDURE — 99024 POSTOP FOLLOW-UP VISIT: CPT | Mod: S$GLB,,, | Performed by: UROLOGY

## 2019-02-21 PROCEDURE — 3079F PR MOST RECENT DIASTOLIC BLOOD PRESSURE 80-89 MM HG: ICD-10-PCS | Mod: CPTII,S$GLB,, | Performed by: INTERNAL MEDICINE

## 2019-02-21 RX ORDER — LIDOCAINE AND PRILOCAINE 25; 25 MG/G; MG/G
CREAM TOPICAL
Qty: 30 G | Refills: 1 | Status: SHIPPED | OUTPATIENT
Start: 2019-02-21 | End: 2020-05-06

## 2019-02-21 RX ORDER — LEVOFLOXACIN 500 MG/1
500 TABLET, FILM COATED ORAL DAILY
Qty: 7 TABLET | Refills: 0 | Status: SHIPPED | OUTPATIENT
Start: 2019-02-21 | End: 2019-03-03

## 2019-02-21 NOTE — PROGRESS NOTES
Hematology/Oncology Office Note    Cc:  Follow-up on pathology    Diagnosis:  Stage IV ovarian cancer  peritoneal carcinomatosis with malignant pleural effusion    Ms. Gaines is a 63-year-old female with PMH significant for HTN, hyperlipidemia, presents to [facility] ED complaining of progressively worsening shortness of breath associated with left abdominal discomfort.  She reports chills but no fever.  Complains of shortness of breath without cough, congestion.  Denies hemoptysis or recent weight loss.  In the ED chest x-ray revealed large right sided pleural effusion.  CT abdomen revealed numerous soft tissue masses within the left kimani abdomen, suggestive of peritoneal carcinomatosis.  Patient has no known history of malignancy.      Patient underwent CT-guided biopsy of omental mass which demonstrated carcinoma consistent with ovarian primary.  In addition cytology from thoracentesis was positive for malignancy.  She underwent IVP for right-sided hydronephrosis and the patient underwent right ureteral stenting by Dr. Santiago.    I have reviewed and updated the HPI, ROS, PMHx, Social Hx, Family Hx and treatment history.    Patient is without new complaints today.  She presents today to discuss scheduling 1st dose of chemotherapy    Treatment:    Surveillance:      Past Medical History:   Diagnosis Date    Anemia     Anxiety     Arthritis     knees    Cancer     ovarian    CHF (congestive heart failure)     Hyperlipemia     Hypertension     Neck pain          Social History:  Former smoker quit in 10/2008  No alcohol or illicit drugs      Family History: family history includes Anesthesia problems in her other; Breast cancer in her maternal aunt and paternal aunt; Colon cancer in her brother; Ovarian cancer in her paternal aunt.        HPI    Review of Systems   Constitutional: Positive for activity change and fatigue. Negative for appetite change, fever and unexpected weight change.   HENT:  "Negative for congestion, hearing loss, mouth sores, nosebleeds, rhinorrhea, sinus pressure and sinus pain.    Eyes: Negative.    Respiratory: Negative for apnea, cough and shortness of breath.    Cardiovascular: Negative for chest pain.   Gastrointestinal: Positive for abdominal distention and abdominal pain. Negative for blood in stool, constipation, diarrhea, nausea and vomiting.   Endocrine: Negative.    Genitourinary: Negative for difficulty urinating, dyspareunia, dysuria, enuresis, flank pain, frequency, genital sores, hematuria and menstrual problem.        Right flank pain   Musculoskeletal: Negative for arthralgias, back pain, gait problem, joint swelling and myalgias.   Skin: Negative for pallor, rash and wound.   Allergic/Immunologic: Negative.    Neurological: Negative for dizziness, seizures, syncope, facial asymmetry, speech difficulty, light-headedness, numbness and headaches.   Hematological: Negative for adenopathy. Does not bruise/bleed easily.   Psychiatric/Behavioral: Negative for agitation, behavioral problems, confusion, decreased concentration and dysphoric mood. The patient is not nervous/anxious.        Objective:         Vitals:    02/21/19 1359   BP: (!) 140/84   Pulse: 89   Resp: 18   Temp: 98.2 °F (36.8 °C)   TempSrc: Oral   SpO2: 98%   Weight: 107.6 kg (237 lb 3.4 oz)   Height: 5' 7" (1.702 m)     Physical Exam   Constitutional: She is oriented to person, place, and time. She appears well-developed and well-nourished. No distress.   Well groomed   HENT:   Head: Normocephalic and atraumatic.   Nose: Nose normal.   Mouth/Throat: Oropharynx is clear and moist. No oropharyngeal exudate.   Eyes: Conjunctivae and EOM are normal. Pupils are equal, round, and reactive to light.   Neck: Normal range of motion. Neck supple. No JVD present. No tracheal deviation present. No thyromegaly present.   Cardiovascular: Normal rate, regular rhythm, normal heart sounds and intact distal pulses. Exam " reveals no friction rub.   No murmur heard.  Pulmonary/Chest: Effort normal and breath sounds normal. No stridor. No respiratory distress. She has no wheezes. She has no rales. She exhibits no tenderness.   Abdominal: Soft. Bowel sounds are normal. There is no tenderness. There is no rebound and no guarding.   Musculoskeletal: Normal range of motion. She exhibits no edema.   Lymphadenopathy:     She has no cervical adenopathy.   Neurological: She is alert and oriented to person, place, and time. No cranial nerve deficit. She exhibits normal muscle tone.   Skin: Skin is warm and dry. Capillary refill takes less than 2 seconds. No abrasion and no rash noted. She is not diaphoretic. No pallor.   Psychiatric: She has a normal mood and affect. Her behavior is normal. Judgment and thought content normal.       Assessment:       63-year-old female status post hospitalization for new diagnosis of peritoneal carcinomatosis with malignant right pleural effusion.  Final pathology is consistent with ovarian primary with CA 2740.  PET scan demonstrates multiple omental and peritoneal avid masses consistent with peritoneal carcinomatosis, extensive retroperitoneal and mediastinal lymphadenopathy, masslike structure in the right iliac fossa likely representing ovarian mass.  Patient has been evaluated by GYN Oncology Dr. Juarez and [facility] with recommendations for him carboplatin/Taxol/Avastin.  She will follow up with Dr. Juarez after cycle 3.    The patient has been consented for treatment in clinic today and will follow up next week to begin treatment.        Stage IV ovarian cancer/peritoneal carcinomatosis with malignant right pleural effusion:  --carboplatin/Taxol/Avastin Q 3 weeks  --BRCA mutation study via [facility]  --f/u with Dr Juarez following cycle 3

## 2019-02-21 NOTE — PROGRESS NOTES
Chief Complaint: right pelvic mass    HPI:   2/21/19: Stent is uncomfortable, having OAB symptoms with some relief from tolterodine.  Sudden urgency and pain radiates to kidney when voids.  Very dissatisfied that the stent is uncomfortable and painful.  Very dissatisfied that it hurts.  Discussed options of removing stent with right renal failure as likely outcome, removing stent in favor of nephrostomy, or continuing with stent.  Pt dissatisfied with these options and desires something different and finds me deficient in not having a better option.  We discussed that the stent is temporary and must be changed at 3 mo interval.  She let me know she will call if she wants to return.  1/29/19: 64 yo woman presented with multiple complaints and imaging revealed right hydronephrosis with right pelvic mass and lymphadenopathy.  Percutaneous biopsy results pending.  IVP shows obstruction of right ureter at pelvic brim.  No prior abd/pelvic pain and no exac/rel factors but some lately..  No hematuria.  No urolithiasis.  No urinary bother.  No  history.      Allergies:  Patient has no known allergies.    Medications: has a current medication list which includes the following prescription(s): hydrocodone-acetaminophen, levofloxacin, lidocaine-prilocaine, multivitamin with minerals, nabumetone, olmesartan-hydrochlorothiazide, oxycodone-acetaminophen, rosuvastatin, tolterodine, zinc gluconate, and albuterol.    Review of Systems:  General: No fever, chills, fatigability, or weight loss.  Skin: No rashes, itching, or changes in color or texture of skin.  Chest: Denies DAVIDSON, cyanosis, wheezing, cough, and sputum production.  Abdomen: Appetite fine. No weight loss. Denies diarrhea, abdominal pain, hematemesis, or blood in stool.  Musculoskeletal: No joint stiffness or swelling. Denies back pain.  : As above.  All other review of systems negative.    PMH:   has a past medical history of Anemia, Anxiety, Arthritis, Cancer, CHF  (congestive heart failure), Hyperlipemia, Hypertension, and Neck pain.    PSH:   has a past surgical history that includes  section; Dilation and curettage of uterus; Hysterectomy; Tubal ligation; breast reduction (10/02/2018); Cystoscopy w/ ureteral stent placement (Right, 2019); Retrograde pyelography (Right, 2019); and Insertion of venous access port (Left, 2019).    FamHx: family history includes Anesthesia problems in her other; Breast cancer in her maternal aunt and paternal aunt; Colon cancer in her brother; Ovarian cancer in her paternal aunt.    SocHx:  reports that she quit smoking about 4 months ago. She has a 25.00 pack-year smoking history. she has never used smokeless tobacco. She reports that she drinks alcohol. She reports that she does not use drugs.     Physical Exam:  Vitals:   Vitals:    19 1612   BP: (!) 158/82   Pulse: 89     General: A&Ox3. No apparent distress. No deformities.  Neck: No masses. Normal thyroid.  Lungs: normal inspiration. No use of accessory muscles.  Heart: normal pulse. No arrhythmias.  Abdomen: Soft. NT. ND  Skin: The skin is warm and dry. No jaundice.  Ext: No c/c/e.  : deferred    Labs/Studies: see chart    Impression/Plan:   1. See top.

## 2019-02-22 DIAGNOSIS — C78.6 PERITONEAL CARCINOMATOSIS: Primary | ICD-10-CM

## 2019-02-22 RX ORDER — PROCHLORPERAZINE MALEATE 10 MG
10 TABLET ORAL EVERY 6 HOURS PRN
Qty: 30 TABLET | Refills: 5 | Status: SHIPPED | OUTPATIENT
Start: 2019-02-22 | End: 2020-02-22

## 2019-02-22 RX ORDER — ONDANSETRON 8 MG/1
8 TABLET, ORALLY DISINTEGRATING ORAL EVERY 8 HOURS PRN
Qty: 30 TABLET | Refills: 5 | Status: SHIPPED | OUTPATIENT
Start: 2019-02-22 | End: 2020-02-22

## 2019-02-22 RX ORDER — DEXAMETHASONE 4 MG/1
TABLET ORAL
Qty: 20 TABLET | Refills: 3 | Status: SHIPPED | OUTPATIENT
Start: 2019-02-22 | End: 2019-10-29

## 2019-02-25 ENCOUNTER — OFFICE VISIT (OUTPATIENT)
Dept: PODIATRY | Facility: CLINIC | Age: 64
End: 2019-02-25
Payer: COMMERCIAL

## 2019-02-25 ENCOUNTER — OFFICE VISIT (OUTPATIENT)
Dept: HEMATOLOGY/ONCOLOGY | Facility: CLINIC | Age: 64
End: 2019-02-25
Payer: COMMERCIAL

## 2019-02-25 VITALS
DIASTOLIC BLOOD PRESSURE: 87 MMHG | SYSTOLIC BLOOD PRESSURE: 145 MMHG | RESPIRATION RATE: 16 BRPM | BODY MASS INDEX: 37.15 KG/M2 | HEIGHT: 67 IN | HEART RATE: 82 BPM

## 2019-02-25 DIAGNOSIS — Q74.2 ACCESSORY NAVICULAR BONE OF LEFT FOOT: ICD-10-CM

## 2019-02-25 DIAGNOSIS — M25.572 PAIN IN LEFT ANKLE AND JOINTS OF LEFT FOOT: ICD-10-CM

## 2019-02-25 DIAGNOSIS — C56.9 MALIGNANT NEOPLASM OF OVARY, UNSPECIFIED LATERALITY: ICD-10-CM

## 2019-02-25 DIAGNOSIS — M24.572 CONTRACTURE, LEFT ANKLE: ICD-10-CM

## 2019-02-25 DIAGNOSIS — M76.822 POSTERIOR TIBIAL TENDON DYSFUNCTION, LEFT: Primary | ICD-10-CM

## 2019-02-25 DIAGNOSIS — M21.42 ACQUIRED PES PLANUS, LEFT: ICD-10-CM

## 2019-02-25 PROCEDURE — 99213 PR OFFICE/OUTPT VISIT, EST, LEVL III, 20-29 MIN: ICD-10-PCS | Mod: S$GLB,,, | Performed by: PODIATRIST

## 2019-02-25 PROCEDURE — 99213 OFFICE O/P EST LOW 20 MIN: CPT | Mod: S$GLB,,, | Performed by: PODIATRIST

## 2019-02-25 PROCEDURE — 3077F PR MOST RECENT SYSTOLIC BLOOD PRESSURE >= 140 MM HG: ICD-10-PCS | Mod: CPTII,S$GLB,, | Performed by: PODIATRIST

## 2019-02-25 PROCEDURE — 99999 PR PBB SHADOW E&M-EST. PATIENT-LVL III: ICD-10-PCS | Mod: PBBFAC,,, | Performed by: PODIATRIST

## 2019-02-25 PROCEDURE — 3077F SYST BP >= 140 MM HG: CPT | Mod: CPTII,S$GLB,, | Performed by: NURSE PRACTITIONER

## 2019-02-25 PROCEDURE — 3079F PR MOST RECENT DIASTOLIC BLOOD PRESSURE 80-89 MM HG: ICD-10-PCS | Mod: CPTII,S$GLB,, | Performed by: PODIATRIST

## 2019-02-25 PROCEDURE — 3078F DIAST BP <80 MM HG: CPT | Mod: CPTII,S$GLB,, | Performed by: NURSE PRACTITIONER

## 2019-02-25 PROCEDURE — 3008F PR BODY MASS INDEX (BMI) DOCUMENTED: ICD-10-PCS | Mod: CPTII,S$GLB,, | Performed by: PODIATRIST

## 2019-02-25 PROCEDURE — 3079F DIAST BP 80-89 MM HG: CPT | Mod: CPTII,S$GLB,, | Performed by: PODIATRIST

## 2019-02-25 PROCEDURE — 99999 PR PBB SHADOW E&M-EST. PATIENT-LVL III: CPT | Mod: PBBFAC,,, | Performed by: PODIATRIST

## 2019-02-25 PROCEDURE — 99215 PR OFFICE/OUTPT VISIT, EST, LEVL V, 40-54 MIN: ICD-10-PCS | Mod: S$GLB,,, | Performed by: NURSE PRACTITIONER

## 2019-02-25 PROCEDURE — 99999 PR PBB SHADOW E&M-EST. PATIENT-LVL I: ICD-10-PCS | Mod: PBBFAC,,, | Performed by: NURSE PRACTITIONER

## 2019-02-25 PROCEDURE — 99215 OFFICE O/P EST HI 40 MIN: CPT | Mod: S$GLB,,, | Performed by: NURSE PRACTITIONER

## 2019-02-25 PROCEDURE — 99999 PR PBB SHADOW E&M-EST. PATIENT-LVL I: CPT | Mod: PBBFAC,,, | Performed by: NURSE PRACTITIONER

## 2019-02-25 PROCEDURE — 3077F PR MOST RECENT SYSTOLIC BLOOD PRESSURE >= 140 MM HG: ICD-10-PCS | Mod: CPTII,S$GLB,, | Performed by: NURSE PRACTITIONER

## 2019-02-25 PROCEDURE — 3078F PR MOST RECENT DIASTOLIC BLOOD PRESSURE < 80 MM HG: ICD-10-PCS | Mod: CPTII,S$GLB,, | Performed by: NURSE PRACTITIONER

## 2019-02-25 PROCEDURE — 3008F BODY MASS INDEX DOCD: CPT | Mod: CPTII,S$GLB,, | Performed by: PODIATRIST

## 2019-02-25 PROCEDURE — 3077F SYST BP >= 140 MM HG: CPT | Mod: CPTII,S$GLB,, | Performed by: PODIATRIST

## 2019-02-25 NOTE — PROGRESS NOTES
62 y/o female who presents to the [facility] today for chemotherapy teaching.  Patient given the Navigation Notebook.  I had a detailed discussion with patient in regards to how to use notebook.  I had a detailed discussion with patient regarding the rationale for chemotherapy, how the chemotherapy works, the process of treatment, potential side effects along with managing the potential side effects.  Also discussed with patient signs and symptoms to report.     I had a detailed discussion with the patient and reviewed the specific medication(s) and gave them a handout describing the potential side effects of Carboplatin/Taxol/Avastin along with the management of those potential side effects. Also discussed with patient signs and symptoms to report.     Discussed in detail potential side effects such as:  Nausea and vomiting  Bone Marrow Suppression  Thrombocytopenia precautions  Anemia  Leukopenia  Fatigue  Anorexia  Alopecia  Stomatitis  Diarrhea  Cystitis  Gastritis  Fever > 100.4  Antiemetics instructions  Skin care  Constipation  Hyperpigmentation  Rash  Photosensitivity  Sunscreen SPF 30   Small frequent meals  Increased protein  Increased calories  Vitamin support   Taste alterations  Neuropathys  Increased Hydration  Renal toxicity   DVTs  Stress   Depression   Port management  Community resources    60 minutes face to face time spent educating patient on chemotherapy regimen including side effects, symptoms to report

## 2019-02-25 NOTE — ASSESSMENT & PLAN NOTE
Chemotherapy teaching performed. All questions answered. She will keep all follow up appointments as scheduled

## 2019-02-25 NOTE — PROGRESS NOTES
Subjective:       Patient ID: Casie Gaines is a 63 y.o. female.    Chief Complaint: Foot Pain (2 week boot check, left foot, rates pain 0/10, wears walking boot, ambulates without assistance, Non-Diabetic Pt, PCP Dr. Ang )    HPI: Casie Gaines presents to the office today, for follow-up concerning left posterior tibial tendon dysfunction due to excessive navicular bone.  Patient has been immobilized with a walking boot since her last evaluation approximately 2 weeks ago. Rossana Ang MD is her primary care provider.  At this time, patient states her pain is a 0/10.  Does state a great improvement of the swelling as well. She is currently about to start chemotherapy treatment for ovarian cancer.  Denies antalgic gait pattern when not wearing the walking boot.    Review of patient's allergies indicates:  No Known Allergies    Past Medical History:   Diagnosis Date    Anemia     Anxiety     Arthritis     knees    Cancer     ovarian    CHF (congestive heart failure)     Hyperlipemia     Hypertension     Neck pain        Family History   Problem Relation Age of Onset    Anesthesia problems Other     Breast cancer Maternal Aunt     Breast cancer Paternal Aunt     Ovarian cancer Paternal Aunt     Colon cancer Brother     Thrombophilia Neg Hx        Social History     Socioeconomic History    Marital status:      Spouse name: Not on file    Number of children: Not on file    Years of education: Not on file    Highest education level: Not on file   Social Needs    Financial resource strain: Not on file    Food insecurity - worry: Not on file    Food insecurity - inability: Not on file    Transportation needs - medical: Not on file    Transportation needs - non-medical: Not on file   Occupational History    Not on file   Tobacco Use    Smoking status: Former Smoker     Packs/day: 1.00     Years: 25.00     Pack years: 25.00     Last attempt to quit: 10/1/2018     Years since  quittin.4    Smokeless tobacco: Never Used    Tobacco comment: States started quit 2 months ago after 30 years   Substance and Sexual Activity    Alcohol use: Yes     Alcohol/week: 0.0 oz     Frequency: Never     Comment: occasionally  No alcohol 72h prior to sx    Drug use: No    Sexual activity: Not Currently     Partners: Male     Comment: hyst; mut monog   Other Topics Concern    Not on file   Social History Narrative    Not on file       Past Surgical History:   Procedure Laterality Date    breast reduction  10/02/2018     SECTION      X 1    CYSTOSCOPY, WITH URETERAL STENT INSERTION Right 2019    [facility] [place]    DILATION AND CURETTAGE OF UTERUS      HYSTERECTOMY      RALH for fibroids (still has ovaries)    INSERTION, VENOUS ACCESS PORT Left 2019    [facility]isses [facility]zon [facility] [place]    PLACEMENT, TRANSOBTURATOR TAPE N/A 2014    [facility]ra [facility] [place]    PYELOGRAM, RETROGRADE Right 2019    [facility] [place]    ROBOTIC HYSTERECTOMY N/A 2014    [facility] [place]    TUBAL LIGATION         Review of Systems   Constitutional: Negative for chills, fatigue and fever.   HENT: Negative for hearing loss.    Eyes: Negative for photophobia and visual disturbance.   Respiratory: Negative for cough, chest tightness, shortness of breath and wheezing.    Cardiovascular: Positive for leg swelling. Negative for chest pain and palpitations.   Gastrointestinal: Negative for constipation, diarrhea, nausea and vomiting.   Endocrine: Negative for cold intolerance and heat intolerance.   Genitourinary: Negative for flank pain.   Musculoskeletal: Negative for gait problem, neck pain and neck stiffness.   Skin: Negative for wound.   Neurological: Negative for light-headedness, numbness and headaches.   Psychiatric/Behavioral: Negative for sleep  "disturbance.          Objective:   BP (!) 145/87 (BP Location: Right arm, Patient Position: Sitting, BP Method: Large (Automatic))   Pulse 82   Resp 16   Ht 5' 7" (1.702 m)   BMI 37.15 kg/m²            LOWER EXTREMITY PHYSICAL EXAMINATION  VASCULAR: The right DP pulse is 2/4 and the left DP is 2/4. The right PT pulse is 2/4 and the left PT pulse is 2/4. Proximal to distal, warm to warm. No dependent rubor or elevation palor is noted. Capillary refill time is less than 3 seconds. Hair growth is appreciated to the dorsal foot and digits.    NEUROLOGY: Proprioception is intact. Sensation to light touch is intact. Protective sensation is intact via 5.07 [place] Aren monofilament. Straight leg raise is negative. Negative Tinel's Sign and negative Valleix Sign. No neurological sensations with compression of the area of Hagan's Nerve in the area of the Abductor Hallucis muscle belly. Upon palpation of the interspaces, there are no neurological sensations stated that radiate proximal or distal. Upon compression of the metatarsal heads from medial to lateral, no neurological sensations or symptoms are stated. Deep tendon reflexes to the lower extremity are WNL.    DERMATOLOGY: Skin is supple, dry and intact. No ecchymosis is noted. No hypertrophic skin formation. No erythema or cellulitis is noted.    ORTHOPEDIC: Manual Muscle Testing is 5/5 in all planes on the left, without pains, with and without resistance. No pains to palpation of the medial or lateral ankle ligaments. No discomfort to palpation of the posterior tibial tendon, peroneal tendon, Achilles tendon or the anterior ankle tendons.  Minimal edema is noted, medial midfoot/hindfoot. Gait pattern is non-antalgic.    Assessment:     1. Posterior tibial tendon dysfunction, left    2. Pain in left ankle and joints of left foot    3. Contracture, left ankle    4. Acquired pes planus, left    5. Accessory navicular bone of left foot        Plan:     Posterior " tibial tendon dysfunction, left    Pain in left ankle and joints of left foot    Contracture, left ankle    Acquired pes planus, left    Accessory navicular bone of left foot      Thorough discussion is had with the patient today, concerning the diagnosis, its etiology, and the treatment algorithm at present. Symptoms are greatly improved at this time s/p 2 weeks of CAM Walker. Patient may D/C CAM Walker to tolerance. I did rec. to start out-patient PT, but with patient's current treatment concerning her cancer, this is not an ideal time.  Continue NSAIDs no overt contraindication or Tylenol as needed for pain management.  Follow up as needed or in the event of symptomology.  Did discuss proper and supportive shoe gear in detail and at length with the patient.  These are shoes with firm and robust arch support; medial counter.  Shoes which only bend at the metatarsophalangeal joint and which are rigid in the midfoot and hindfoot. Patient urged to purchase running type or cross training type shoes gear which are designed for pronation control.           Future Appointments   Date Time Provider Department Center   2/25/2019  3:30 PM Jennifer Pineda NP [facility] HEM ONC [facility]   2/28/2019  7:50 AM BRADFORD JONES [facility] LAB DS [facility]   2/28/2019  8:00 AM Will Trevizo Jr., MD [facility] HEM ONC [facility]   2/28/2019  8:30 AM CHAIR 06 [facility] CHEMO [facility]   3/8/2019 11:00 AM Blake Estrada MD [facility] CARDIO  Medical C

## 2019-02-26 ENCOUNTER — TELEPHONE (OUTPATIENT)
Dept: INTERNAL MEDICINE | Facility: CLINIC | Age: 64
End: 2019-02-26

## 2019-02-26 ENCOUNTER — TELEPHONE (OUTPATIENT)
Dept: HEMATOLOGY/ONCOLOGY | Facility: CLINIC | Age: 64
End: 2019-02-26

## 2019-02-26 NOTE — TELEPHONE ENCOUNTER
Called and spoke with patient. She said that Dr. Ishmael Gottlieb will give her an appointment today if we can fax the referral over today. I told her that I would fax over once it is in.

## 2019-02-26 NOTE — TELEPHONE ENCOUNTER
----- Message from Mercedes Lundberg sent at 2/26/2019  1:26 PM CST -----  Contact: self  Type:  Needs Medical Advice    Who Called: pt  Symptoms (please be specific): n/a   How long has patient had these symptoms:  n/a  Pharmacy name and phone #:  n/a  Would the patient rather a call back or a response via [facility]? Call back  Best Call Back Number: [phone]  Additional Information: pt states she forgot to inform nurse about rx that shes taking and wants to know if she can take it before taking chemo.    Thanks,  Mercedes Lundberg

## 2019-02-26 NOTE — TELEPHONE ENCOUNTER
----- Message from Mario Perez sent at 2/26/2019  8:57 AM CST -----  Contact: Pt  Please give pt a call at ..[phone] (home) she is requesting a referral to see a urologist Dr. Ishmael Gottlieb (contact number is [phone]) Pt didn't have the fax number

## 2019-02-27 ENCOUNTER — TELEPHONE (OUTPATIENT)
Dept: INTERNAL MEDICINE | Facility: CLINIC | Age: 64
End: 2019-02-27

## 2019-02-27 ENCOUNTER — DOCUMENTATION ONLY (OUTPATIENT)
Dept: HEMATOLOGY/ONCOLOGY | Facility: CLINIC | Age: 64
End: 2019-02-27

## 2019-02-27 NOTE — TELEPHONE ENCOUNTER
----- Message from Rossana Ang MD sent at 2/27/2019  1:55 PM CST -----  Contact: Poonam with Dr. Love Gottlieb  Please fax  ----- Message -----  From: Maryellen Swan MA  Sent: 2/27/2019   1:17 PM  To: Rossana Ang MD        ----- Message -----  From: Nathalia Mustafa  Sent: 2/27/2019  10:18 AM  To: Sav Tracy Staff    Calling to get last office visit, medical list and all up to date diagnosis on the list of patient. Please call Poonam @ [phone]. Please fax to [phone]. Thanks, artis

## 2019-02-27 NOTE — PROGRESS NOTES
Peer to peer requested for authorization of neulasta.  Dr. Trevizo notified and he stated to remove the neulasta from the plan.  Neulasta removed and referral center notified per MD order

## 2019-02-28 ENCOUNTER — LAB VISIT (OUTPATIENT)
Dept: LAB | Facility: HOSPITAL | Age: 64
End: 2019-02-28
Attending: INTERNAL MEDICINE
Payer: COMMERCIAL

## 2019-02-28 ENCOUNTER — OFFICE VISIT (OUTPATIENT)
Dept: HEMATOLOGY/ONCOLOGY | Facility: CLINIC | Age: 64
End: 2019-02-28
Payer: COMMERCIAL

## 2019-02-28 ENCOUNTER — INFUSION (OUTPATIENT)
Dept: INFUSION THERAPY | Facility: HOSPITAL | Age: 64
End: 2019-02-28
Attending: INTERNAL MEDICINE
Payer: COMMERCIAL

## 2019-02-28 ENCOUNTER — SOCIAL WORK (OUTPATIENT)
Dept: HEMATOLOGY/ONCOLOGY | Facility: CLINIC | Age: 64
End: 2019-02-28

## 2019-02-28 VITALS
SYSTOLIC BLOOD PRESSURE: 148 MMHG | HEART RATE: 100 BPM | RESPIRATION RATE: 20 BRPM | BODY MASS INDEX: 36.54 KG/M2 | HEIGHT: 67 IN | DIASTOLIC BLOOD PRESSURE: 85 MMHG | WEIGHT: 232.81 LBS

## 2019-02-28 VITALS
WEIGHT: 232.81 LBS | DIASTOLIC BLOOD PRESSURE: 75 MMHG | BODY MASS INDEX: 36.46 KG/M2 | HEART RATE: 105 BPM | TEMPERATURE: 98 F | SYSTOLIC BLOOD PRESSURE: 149 MMHG | RESPIRATION RATE: 18 BRPM | OXYGEN SATURATION: 95 %

## 2019-02-28 DIAGNOSIS — C78.6 PERITONEAL CARCINOMATOSIS: ICD-10-CM

## 2019-02-28 DIAGNOSIS — C78.6 PERITONEAL CARCINOMATOSIS: Primary | ICD-10-CM

## 2019-02-28 DIAGNOSIS — C56.1 MALIGNANT NEOPLASM OF RIGHT OVARY: ICD-10-CM

## 2019-02-28 DIAGNOSIS — C56.9 MALIGNANT NEOPLASM OF OVARY, UNSPECIFIED LATERALITY: Primary | ICD-10-CM

## 2019-02-28 LAB
ALBUMIN SERPL BCP-MCNC: 3.8 G/DL
ALP SERPL-CCNC: 128 U/L
ALT SERPL W/O P-5'-P-CCNC: 44 U/L
ANION GAP SERPL CALC-SCNC: 12 MMOL/L
AST SERPL-CCNC: 39 U/L
BASOPHILS # BLD AUTO: 0.01 K/UL
BASOPHILS NFR BLD: 0.1 %
BILIRUB SERPL-MCNC: 0.4 MG/DL
BUN SERPL-MCNC: 15 MG/DL
CALCIUM SERPL-MCNC: 10.6 MG/DL
CHLORIDE SERPL-SCNC: 108 MMOL/L
CO2 SERPL-SCNC: 22 MMOL/L
CREAT SERPL-MCNC: 0.8 MG/DL
DIFFERENTIAL METHOD: ABNORMAL
EOSINOPHIL # BLD AUTO: 0 K/UL
EOSINOPHIL NFR BLD: 0 %
ERYTHROCYTE [DISTWIDTH] IN BLOOD BY AUTOMATED COUNT: 15.6 %
EST. GFR  (AFRICAN AMERICAN): >60 ML/MIN/1.73 M^2
EST. GFR  (NON AFRICAN AMERICAN): >60 ML/MIN/1.73 M^2
GLUCOSE SERPL-MCNC: 201 MG/DL
HCT VFR BLD AUTO: 37.6 %
HGB BLD-MCNC: 12 G/DL
LYMPHOCYTES # BLD AUTO: 1.2 K/UL
LYMPHOCYTES NFR BLD: 8.9 %
MCH RBC QN AUTO: 26.7 PG
MCHC RBC AUTO-ENTMCNC: 31.9 G/DL
MCV RBC AUTO: 84 FL
MONOCYTES # BLD AUTO: 0.1 K/UL
MONOCYTES NFR BLD: 0.4 %
NEUTROPHILS # BLD AUTO: 12.3 K/UL
NEUTROPHILS NFR BLD: 90.6 %
PLATELET # BLD AUTO: 256 K/UL
PMV BLD AUTO: 9.7 FL
POTASSIUM SERPL-SCNC: 3.7 MMOL/L
PROT SERPL-MCNC: 7.7 G/DL
RBC # BLD AUTO: 4.49 M/UL
SODIUM SERPL-SCNC: 142 MMOL/L
WBC # BLD AUTO: 13.55 K/UL

## 2019-02-28 PROCEDURE — 99999 PR PBB SHADOW E&M-EST. PATIENT-LVL III: ICD-10-PCS | Mod: PBBFAC,,, | Performed by: INTERNAL MEDICINE

## 2019-02-28 PROCEDURE — 96375 TX/PRO/DX INJ NEW DRUG ADDON: CPT

## 2019-02-28 PROCEDURE — S0028 INJECTION, FAMOTIDINE, 20 MG: HCPCS | Performed by: INTERNAL MEDICINE

## 2019-02-28 PROCEDURE — 86304 IMMUNOASSAY TUMOR CA 125: CPT

## 2019-02-28 PROCEDURE — 99215 OFFICE O/P EST HI 40 MIN: CPT | Mod: S$GLB,,, | Performed by: INTERNAL MEDICINE

## 2019-02-28 PROCEDURE — 96367 TX/PROPH/DG ADDL SEQ IV INF: CPT

## 2019-02-28 PROCEDURE — 80053 COMPREHEN METABOLIC PANEL: CPT

## 2019-02-28 PROCEDURE — A4216 STERILE WATER/SALINE, 10 ML: HCPCS | Performed by: INTERNAL MEDICINE

## 2019-02-28 PROCEDURE — 3078F PR MOST RECENT DIASTOLIC BLOOD PRESSURE < 80 MM HG: ICD-10-PCS | Mod: CPTII,S$GLB,, | Performed by: INTERNAL MEDICINE

## 2019-02-28 PROCEDURE — 96417 CHEMO IV INFUS EACH ADDL SEQ: CPT

## 2019-02-28 PROCEDURE — 25000003 PHARM REV CODE 250: Performed by: INTERNAL MEDICINE

## 2019-02-28 PROCEDURE — 3008F BODY MASS INDEX DOCD: CPT | Mod: CPTII,S$GLB,, | Performed by: INTERNAL MEDICINE

## 2019-02-28 PROCEDURE — 96413 CHEMO IV INFUSION 1 HR: CPT

## 2019-02-28 PROCEDURE — 36415 COLL VENOUS BLD VENIPUNCTURE: CPT

## 2019-02-28 PROCEDURE — 63600175 PHARM REV CODE 636 W HCPCS: Performed by: INTERNAL MEDICINE

## 2019-02-28 PROCEDURE — 99999 PR PBB SHADOW E&M-EST. PATIENT-LVL III: CPT | Mod: PBBFAC,,, | Performed by: INTERNAL MEDICINE

## 2019-02-28 PROCEDURE — 3077F SYST BP >= 140 MM HG: CPT | Mod: CPTII,S$GLB,, | Performed by: INTERNAL MEDICINE

## 2019-02-28 PROCEDURE — 96415 CHEMO IV INFUSION ADDL HR: CPT

## 2019-02-28 PROCEDURE — 3008F PR BODY MASS INDEX (BMI) DOCUMENTED: ICD-10-PCS | Mod: CPTII,S$GLB,, | Performed by: INTERNAL MEDICINE

## 2019-02-28 PROCEDURE — 85025 COMPLETE CBC W/AUTO DIFF WBC: CPT

## 2019-02-28 PROCEDURE — 3077F PR MOST RECENT SYSTOLIC BLOOD PRESSURE >= 140 MM HG: ICD-10-PCS | Mod: CPTII,S$GLB,, | Performed by: INTERNAL MEDICINE

## 2019-02-28 PROCEDURE — 99215 PR OFFICE/OUTPT VISIT, EST, LEVL V, 40-54 MIN: ICD-10-PCS | Mod: S$GLB,,, | Performed by: INTERNAL MEDICINE

## 2019-02-28 PROCEDURE — 3078F DIAST BP <80 MM HG: CPT | Mod: CPTII,S$GLB,, | Performed by: INTERNAL MEDICINE

## 2019-02-28 RX ORDER — DIPHENHYDRAMINE HYDROCHLORIDE 50 MG/ML
50 INJECTION INTRAMUSCULAR; INTRAVENOUS ONCE AS NEEDED
Status: DISCONTINUED | OUTPATIENT
Start: 2019-02-28 | End: 2019-02-28 | Stop reason: HOSPADM

## 2019-02-28 RX ORDER — FAMOTIDINE 10 MG/ML
20 INJECTION INTRAVENOUS
Status: COMPLETED | OUTPATIENT
Start: 2019-02-28 | End: 2019-02-28

## 2019-02-28 RX ORDER — EPINEPHRINE 0.3 MG/.3ML
0.3 INJECTION SUBCUTANEOUS ONCE AS NEEDED
Status: CANCELLED | OUTPATIENT
Start: 2019-02-28 | End: 2019-02-28

## 2019-02-28 RX ORDER — EPINEPHRINE 1 MG/ML
0.3 INJECTION, SOLUTION INTRACARDIAC; INTRAMUSCULAR; INTRAVENOUS; SUBCUTANEOUS ONCE AS NEEDED
Status: DISCONTINUED | OUTPATIENT
Start: 2019-02-28 | End: 2019-02-28 | Stop reason: HOSPADM

## 2019-02-28 RX ORDER — DIPHENHYDRAMINE HYDROCHLORIDE 50 MG/ML
50 INJECTION INTRAMUSCULAR; INTRAVENOUS ONCE AS NEEDED
Status: CANCELLED | OUTPATIENT
Start: 2019-02-28 | End: 2019-02-28

## 2019-02-28 RX ORDER — SODIUM CHLORIDE 0.9 % (FLUSH) 0.9 %
10 SYRINGE (ML) INJECTION
Status: CANCELLED | OUTPATIENT
Start: 2019-02-28

## 2019-02-28 RX ORDER — HEPARIN 100 UNIT/ML
500 SYRINGE INTRAVENOUS
Status: DISCONTINUED | OUTPATIENT
Start: 2019-02-28 | End: 2019-02-28 | Stop reason: HOSPADM

## 2019-02-28 RX ORDER — HEPARIN 100 UNIT/ML
500 SYRINGE INTRAVENOUS
Status: CANCELLED | OUTPATIENT
Start: 2019-02-28

## 2019-02-28 RX ORDER — FAMOTIDINE 10 MG/ML
20 INJECTION INTRAVENOUS
Status: CANCELLED | OUTPATIENT
Start: 2019-02-28

## 2019-02-28 RX ORDER — SODIUM CHLORIDE 0.9 % (FLUSH) 0.9 %
10 SYRINGE (ML) INJECTION
Status: DISCONTINUED | OUTPATIENT
Start: 2019-02-28 | End: 2019-02-28 | Stop reason: HOSPADM

## 2019-02-28 RX ADMIN — BEVACIZUMAB 1600 MG: 400 INJECTION, SOLUTION INTRAVENOUS at 03:02

## 2019-02-28 RX ADMIN — HEPARIN 500 UNITS: 100 SYRINGE at 05:02

## 2019-02-28 RX ADMIN — PACLITAXEL 396 MG: 6 INJECTION, SOLUTION INTRAVENOUS at 11:02

## 2019-02-28 RX ADMIN — SODIUM CHLORIDE: 0.9 INJECTION, SOLUTION INTRAVENOUS at 09:02

## 2019-02-28 RX ADMIN — CARBOPLATIN 870 MG: 10 INJECTION, SOLUTION INTRAVENOUS at 02:02

## 2019-02-28 RX ADMIN — SODIUM CHLORIDE 10 ML: 9 INJECTION, SOLUTION INTRAMUSCULAR; INTRAVENOUS; SUBCUTANEOUS at 09:02

## 2019-02-28 RX ADMIN — DEXAMETHASONE SODIUM PHOSPHATE: 4 INJECTION, SOLUTION INTRA-ARTICULAR; INTRALESIONAL; INTRAMUSCULAR; INTRAVENOUS; SOFT TISSUE at 10:02

## 2019-02-28 RX ADMIN — FAMOTIDINE 20 MG: 10 INJECTION, SOLUTION INTRAVENOUS at 10:02

## 2019-02-28 RX ADMIN — DIPHENHYDRAMINE HYDROCHLORIDE 50 MG: 50 INJECTION, SOLUTION INTRAMUSCULAR; INTRAVENOUS at 09:02

## 2019-02-28 NOTE — DISCHARGE INSTRUCTIONS
[facility]  [address]  [address] [address]  [phone] phone     [phone] fax  Hours of Operation: Monday- Friday 8:00am- 5:00pm  After hours phone  [phone]  Hematology / Oncology Physicians on call      Dr. Varinder Trevizo                        Please call with any concerns regarding your appointment today.      HOME CARE AFTER CHEMOTHERAPY   Meals   Many patients feel sick and lose their appetites during treatment. Eat small meals several times a day. Choose bland foods with little taste or smell if you have problems with nausea. Be sure to cook all food thoroughly. This kills bacteria and helps you avoid intestinal infection. Soft foods are easier to swallow and digest.   Activity   Exercise keeps you strong and keeps your heart and lungs active. Talk to your doctor about an appropriate exercise program for you.   Skin Care   To prevent a skin infection, bathe or shower once a day. Use a moisturizing soap and wash with warm water. Avoid very hot or cold water. Chemotherapy can make your skin dry . Apply moisturizing lotion to help relieve dry skin. Some drugs used in high doses can cause slight burns to appear (like sunburn). Ask for a special cream to help relieve the burn and protect your skin.   Prevent Mouth Sores   During chemotherapy, many people get mouth sores. Do the following to help prevent mouth sores or to ease discomfort.   Brush your teeth with a soft-bristle toothbrush after every meal.  Don't use dental floss if your platelet count is below 50,000. Your doctor or nurse will tell you if this is the case.  Use an oral swab or special soft toothbrush if your gums bleed during regular brushing.  Use mouthwash as directed. If you can't tolerate commercial mouthwash, use salt and baking soda to clean your mouth. Mix 1 teaspoon of salt and 1 teaspoon of baking soda into a glass of water. Swish and spit.  Call your  doctor or return to this facility if you develop any of the following:   Sore throat   White patches in the mouth or throat   Fever of 100.4ºF (38ºC) or higher, or as directed by your healthcare provider  © 2019-8358 [employer], [address] [place] [address], [place] [address]. All rights reserved. This information is not intended as a substitute for professional medical care. Always follow your healthcare professional's       WAYS TO HELP PREVENT INFECTION         WASH YOUR HANDS OFTEN DURING THE DAY, ESPECIALLY BEFORE YOU EAT, AFTER USING THE BATHROOM, AND AFTER TOUCHING ANIMALS     STAY AWAY FROM PEOPLE WHO HAVE ILLNESSES YOU CAN CATCH; SUCH AS COLDS, FLU, CHICKEN POX     TRY TO AVOID CROWDS     STAY AWAY FROM CHILDREN WHO RECENTLY HAVE RECEIVED LIVE VIRUS VACCINES     MAINTAIN GOOD MOUTH CARE     DO NOT SQUEEZE OR SCRATCH PIMPLES     CLEAN CUTS & SCRAPES RIGHT AWAY AND DAILY UNTIL HEALED WITH WARM WATER, SOAP & AN ANTISEPTIC     AVOID CONTACT WITH LITTER BOXES, BIRD CAGES, & FISH TANKS     AVOID STANDING WATER, IE., BIRD BATHS, FLOWER POTS/VASES, OR HUMIDIFIERS     WEAR GLOVES WHEN GARDENING OR CLEANING UP AFTER OTHERS, ESPECIALLY BABIES & SMALL CHILDREN     DO NOT EAT RAW FISH, SEAFOOD, MEAT, OR EGGS    YOU HAVE STARTED ON CHEMOTHERAPY. IF YOU EXPERIENCE ANY OF THE FOLLOWING PROBLEMS, CALL THE OFFICE IMMEDIATELY.    *FEVER .0 OR GREATER    *CHILLS, ESPECIALLY SHAKING CHILLS, OR SWEATING    *A SEVERE COUGH OR SORE THROAT, OR SINUS PAIN/     PRESSURE    *REDNESS, SWELLING, OR TENDERNESS AROUND A WOUND,     SORE, PIMPLE, RECTAL AREA, OR IV SITE    *SORES OR ULCERS IN THE MOUTH    *BLISTERS ON THE LIPS OR SKIN    *FREQUENT URGENCY TO URINATE OR A BURNING FEELING   WHEN YOU URINATE    *BLOOD IN THE URINE OR STOOL    *ANY UNEXPLAINED BRUISING OR PROLONGED BLEEDING,     (NOSEBLEEDS OR BLEEDING GUMS)    *LOOSE BOWEL MOVEMENTS THAT DO NOT RESPOND TO     IMODIUM OR MORE THAN THREE TIMES A  DAY    *VOMITING UNRESPONSIVE TO ANTINAUSEA MEDICINE    *ANY UNUSUAL PHYSICAL SYMPTOMS THAT BEGAN AFTER     CHEMOTHERAPY    DURING WEEKDAYS, CALL AND ASK TO SPEAK DIRECTLY TO A NURSE.  AT OTHER TIMES, CALL THE OFFICE PHONE NUMBER; THE ANSWERING SERVICE WILL CONTACT THE ON-CALL PHYSICIAN.  SOMEONE IS AVAILABLE 24 HOURS A DAY, SEVEN DAYS A WEEK.    FALL PREVENTION   Falls often occur due to slipping, tripping or losing your balance. Here are ways to reduce your risk of falling again.   Was there anything that caused your fall that can be fixed, removed or replaced?   Make your home safe by keeping walkways clear of objects you may trip over.   Use non-slip pads under rugs.   Do not walk in poorly lit areas.   Do not stand on chairs or wobbly ladders.   Use caution when reaching overhead or looking upward. This position can cause a loss of balance.   Be sure your shoes fit properly, have non-slip bottoms and are in good condition.   Be cautious when going up and down stairs, curbs, and when walking on uneven sidewalks.   If your balance is poor, consider using a cane or walker.   If your fall was related to alcohol use, stop or limit alcohol intake.   If your fall was related to use of sleeping medicines, talk to your doctor about this. You may need to reduce your dosage at bedtime if you awaken during the night to go to the bathroom.   To reduce the need for nighttime bathroom trips:   Avoid drinking fluids for several hours before going to bed   Empty your bladder before going to bed   Men can keep a urinal at the bedside   © 1938-5908 [employer] \[facility] [place] [facility]\"", [address] [place] [address], [place] [address]. All rights reserved. This information is not intended as a substitute for professional medical care. Always follow your healthcare professional's instructions.    Nutrition During Chemotherapy   During chemotherapy, the energy provided by a healthy diet can help you rebuild normal cells. It can also help you keep up your  strength and fight infection. As a result, you may feel better and be more able to cope with side effects. Ask your doctor about your nutrition needs.   Drink Plenty of Fluids   Fluids help the body produce urine and decrease constipation. They help prevent kidney and bladder problems. They also help replace fluids lost from vomiting and diarrhea.   Try water, unsweetened juices, and other flavored drinks without caffeine. They flush toxins from the body. Avoid drinks with added sugar or those with artificial sweetener.  Get Enough Calories   Calories are fuel. The body uses this fuel to perform all of its functions, including healing.   Its okay to be lean, but be sure you are not underweight. If you are, try eating more calories.   Eat calorie-dense foods such as avocados, peanut butter, eggs, and ice cream.   If you need extra calories, add butter, gravy, and sauces to foods (if tolerated).   Limit the amount of processed foods you eat. Also try to limit foods that are fried, greasy, or high in fat or added sugar.  Eat Protein, Fruits, and Vegetables   Protein builds muscle, bone, skin, and blood. It helps your body heal and fight infection. It also helps boost your energy level.   Good choices include yogurt, eggs, chicken, lean meats, and peanut butter.   Fruits and vegetables are full of vitamins and nutrition. Beans are high in protein.   Try to eat a variety of vegetables, fruits, whole grains, and beans.   Ask your doctor about instant protein powder or other supplements.  Eating Right During Treatment   Side effects may make it a little harder to eat well on some days. The following tips will help you to continue to get the nutrition you need.   Be open to new foods and recipes.   Eat small portions often and slowly.   Have a healthy snack instead of a meal if you are not very hungry.   Try eating in a new setting.   Physical activity, such as walking, can help increase your appetite. Try to be active for  at least 30 minutes each day.   Round off your diet with vitamins from fruit, vegetables, and grains.   If you live alone and are not up to cooking, ask your healthcare provider about Meals on Wheels or other outreach programs.  For more information, go to www.cancer.org or call 6-759-OBP-6903.    © 7994-3853 [employer], [address] [place] [address], [place] [address]. All rights reserved. This information is not intended as a substitute for professional medical care. Always follow your healthcare professional's

## 2019-02-28 NOTE — PROGRESS NOTES
Hematology/Oncology Office Note    Cc:  Follow-up on pathology    Diagnosis:  Stage IV ovarian cancer  peritoneal carcinomatosis with malignant pleural effusion    Treatment:  02/28/2019 carboplatin/Taxol/Avastin cycle 1 day 1    Ms. Gaines is a 63-year-old female with PMH significant for HTN, hyperlipidemia, presents to [facility] ED complaining of progressively worsening shortness of breath associated with left abdominal discomfort.  She reports chills but no fever.  Complains of shortness of breath without cough, congestion.  Denies hemoptysis or recent weight loss.  In the ED chest x-ray revealed large right sided pleural effusion.  CT abdomen revealed numerous soft tissue masses within the left kimani abdomen, suggestive of peritoneal carcinomatosis.  Patient has no known history of malignancy.      Patient underwent CT-guided biopsy of omental mass which demonstrated carcinoma consistent with ovarian primary.  In addition cytology from thoracentesis was positive for malignancy.  She underwent IVP for right-sided hydronephrosis and the patient underwent right ureteral stenting by Dr. Santiago.    I have reviewed and updated the HPI, ROS, PMHx, Social Hx, Family Hx and treatment history.    Today's visit:  Patient presents today to start chemotherapy with carboplatin/Taxol/Avastin    Treatment:    Surveillance:      Past Medical History:   Diagnosis Date    Anemia     Anxiety     Arthritis     knees    Cancer     ovarian    CHF (congestive heart failure)     Hyperlipemia     Hypertension     Neck pain          Social History:  Former smoker quit in 10/2008  No alcohol or illicit drugs      Family History: family history includes Anesthesia problems in her other; Breast cancer in her maternal aunt and paternal aunt; Colon cancer in her brother; Ovarian cancer in her paternal aunt.        HPI    Review of Systems   Constitutional: Positive for activity change and fatigue. Negative for appetite  change, chills, diaphoresis, fever and unexpected weight change.   HENT: Negative for congestion, drooling, ear discharge, hearing loss, mouth sores, nosebleeds, rhinorrhea, sinus pressure, sinus pain, sore throat, tinnitus and trouble swallowing.    Eyes: Negative.    Respiratory: Negative for apnea, cough, choking, chest tightness and shortness of breath.    Cardiovascular: Negative for chest pain.   Gastrointestinal: Positive for abdominal distention and abdominal pain. Negative for blood in stool, constipation, diarrhea, nausea and vomiting.   Endocrine: Negative.    Genitourinary: Negative for difficulty urinating, enuresis, flank pain, frequency, genital sores, hematuria and menstrual problem.        Right flank pain   Musculoskeletal: Negative for arthralgias, back pain, gait problem, joint swelling and myalgias.   Skin: Negative for pallor, rash and wound.   Allergic/Immunologic: Negative.    Neurological: Negative for dizziness, seizures, syncope, speech difficulty, light-headedness and numbness.   Hematological: Negative for adenopathy. Does not bruise/bleed easily.   Psychiatric/Behavioral: Negative for agitation, behavioral problems, confusion, decreased concentration and dysphoric mood. The patient is not nervous/anxious.        Objective:         Vitals:    02/28/19 0829   BP: (!) 149/75   Pulse: 105   Resp: 18   Temp: 97.6 °F (36.4 °C)   TempSrc: Oral   SpO2: 95%   Weight: 105.6 kg (232 lb 12.9 oz)     Physical Exam   Constitutional: She is oriented to person, place, and time. She appears well-developed and well-nourished. No distress.   Well groomed   HENT:   Head: Normocephalic and atraumatic.   Nose: Nose normal.   Mouth/Throat: Oropharynx is clear and moist. No oropharyngeal exudate.   Eyes: Conjunctivae and EOM are normal. Pupils are equal, round, and reactive to light.   Neck: Normal range of motion. Neck supple. No JVD present. No tracheal deviation present. No thyromegaly present.    Cardiovascular: Normal rate, regular rhythm, normal heart sounds and intact distal pulses. Exam reveals no friction rub.   No murmur heard.  Pulmonary/Chest: Effort normal and breath sounds normal. No accessory muscle usage or stridor. No respiratory distress. She has no decreased breath sounds. She has no wheezes. She has no rhonchi. She has no rales. She exhibits no tenderness.   Abdominal: Soft. Bowel sounds are normal. There is no tenderness. There is no rebound and no guarding.   Musculoskeletal: Normal range of motion. She exhibits no edema.   Lymphadenopathy:     She has no cervical adenopathy.   Neurological: She is alert and oriented to person, place, and time. No cranial nerve deficit. She exhibits normal muscle tone.   Skin: Skin is warm, dry and intact. Capillary refill takes less than 2 seconds. No abrasion, no bruising, no ecchymosis and no rash noted. She is not diaphoretic. No cyanosis. No pallor. Nails show no clubbing.   Psychiatric: She has a normal mood and affect. Her behavior is normal. Judgment and thought content normal.       Assessment:       63-year-old female status post hospitalization for new diagnosis of peritoneal carcinomatosis with malignant right pleural effusion.  Final pathology is consistent with ovarian primary with CA 2740.  PET scan demonstrates multiple omental and peritoneal avid masses consistent with peritoneal carcinomatosis, extensive retroperitoneal and mediastinal lymphadenopathy, masslike structure in the right iliac fossa likely representing ovarian mass.  Patient has been evaluated by GYN Oncology Dr. Juarez and MD Green with recommendations for him carboplatin/Taxol/Avastin.  She will follow up with Dr. Juarez after cycle 3.    We will proceed with carboplatin/Taxol/Avastin.  She will follow up in 7-10 days to evaluate for toxicity and plan to continue treatment Q 3 weeks.  She will follow up with Dr. Juarez after cycle 3      Stage IV ovarian cancer/peritoneal  carcinomatosis with malignant right pleural effusion:  --carboplatin/Taxol/Avastin Q 3 weeks  --proceed with cycle 1 day 1 today  --follow-up 7-10 days to evaluate for toxicity  --BRCA mutation study via [facility]  --f/u with Dr Juarez following cycle 3

## 2019-02-28 NOTE — PROGRESS NOTES
"[facility] first treatment for introductions. Pt appeared alert, oriented and in no acute distress. SW introduced her role within the department and informed her about internal resources (including chairside delivery as she mentioned being frustrated with outside retail pharmacy when they called her during our meeting) as well as external resources such as [facility] ([facility]) and [employer] (ACS). SW indicated pt had also completed a distress screening last week and pt went into some of the issues she had checked off.     VU provided pt with emotional support for approx 60 minutes as pt discussed troubles in her current relationship. She denied occurrence of any kind of abuse or neglect. Pt also mentioned experiences with her family including her brother's passing of colon cancer about 6 years ago. Pt explained he stayed with her for the last 18 months of his life and she was his primary caregiver since her mother was already caring for other family members. Pt also talked at length about the murder of her son, who was an up-and-coming rapper in the local area. Pt reported her son left her with 4 beautiful grandchildren, whom she continues to help raise with the two mothers. Pt discussed how difficult the death has been on her family, and although she wishes justice for her son, she knows finding such is out of her control. Pt expressed that since her diagnosis she acknowledges now is time for her to take care of herself so that she can improve her health. Her goal is to remain living long enough to watch her grandchildren grow into adulthood. She discussed the scan she was shown today, and she reported her cancer seems to have spread to several places, but she stated she will live. Pt verbalized appreciation for our discussion as she reported she felt a "load off." Pt explained she does not process her emotions well and she " reported she does not typically discuss her son's death. Pt indicated she may need to seek counseling after she is settled into her treatment plan. SW will f/u with her about this in upcoming weeks and will offer telemed services (which she was informed about today).     Pt expressed interest in a referral to [facility] today. SW will fax to agency on pt's behalf. She deferred other referrals at this time. SW provided pt with her contact info should further needs arise. VU will plan to f/u with pt in a few weeks.

## 2019-02-28 NOTE — PATIENT INSTRUCTIONS
Nutrition During Chemotherapy     Drink plenty of liquids, such as water.     During chemotherapy, the energy provided by a healthy diet can help you rebuild normal cells. It can also help you keep up your strength and fight infection. As a result, you may feel better and be more able to cope with side effects. Ask your doctor about your nutrition needs.  Drink plenty of fluids  · Fluids help the body produce urine and decrease constipation. They help prevent kidney and bladder problems. They also help replace fluids lost from vomiting and diarrhea.  · Try water, unsweetened juices, and other flavored drinks without caffeine. They flush toxins from the body.  Get enough calories  · Calories are fuel. The body uses this fuel to perform all of its functions, including healing.  · Its OK to be lean, but be sure you are not underweight. If you are, try eating more calories.  · Eat calorie-dense foods such as avocados, peanut butter, eggs, and ice cream.  · If you need extra calories, add butter, gravy, and sauces to foods (if tolerated).  · If you don't need the extra calories, try to limit foods that are fried, greasy, or high in fat or added sugar.  Eat protein, fruits, and vegetables  · Protein builds muscle, bone, skin, and blood. It helps your body heal and fight infection. It also helps boost your energy level.  · Good protein choices include yogurt, eggs, chicken, lean meats, beans, and peanut butter.  · Fruits and vegetables are full of important vitamins, minerals, and fiber to help your body function properly.  · Try to eat a variety of vegetables, fruits, whole grains, and beans.  · Ask your doctor about instant protein powder or other supplements.  Eating right during treatment  Side effects may make it a little harder to eat well on some days. The following tips will help you continue to get the nutrition you need:  · Be open to new foods and recipes.  · Eat small portions often and slowly.  · Have a  healthy snack instead of a meal if you are not very hungry.  · Try eating in a new setting.  · Physical activity, such as walking, can help increase your appetite. Try to be active for at least 30 minutes each day.  · Boost your diet by getting the vitamins and minerals you need from fruits, vegetables, and whole grains.  · If you live alone and are not up to cooking, ask your healthcare provider about Meals on Wheels or other outreach programs.  · Sometimes, it is best to follow your appetitie. Eat when you are hungry, but when you ar enot, forcing yourself to eat can make you feel bad, nauseated, or even cause you to vomit.   Date Last Reviewed: 1/6/2016 © 2000-2017 [employer]. [address] [place] [address], [place] [address]. All rights reserved. This information is not intended as a substitute for professional medical care. Always follow your healthcare professional's instructions.        Nutrition During Chemotherapy     Drink plenty of liquids, such as water.     During chemotherapy, the energy provided by a healthy diet can help you rebuild normal cells. It can also help you keep up your strength and fight infection. As a result, you may feel better and be more able to cope with side effects. Ask your doctor about your nutrition needs.  Drink plenty of fluids  · Fluids help the body produce urine and decrease constipation. They help prevent kidney and bladder problems. They also help replace fluids lost from vomiting and diarrhea.  · Try water, unsweetened juices, and other flavored drinks without caffeine. They flush toxins from the body.  Get enough calories  · Calories are fuel. The body uses this fuel to perform all of its functions, including healing.  · Its OK to be lean, but be sure you are not underweight. If you are, try eating more calories.  · Eat calorie-dense foods such as avocados, peanut butter, eggs, and ice cream.  · If you need extra calories, add butter, gravy, and sauces to foods  (if tolerated).  · If you don't need the extra calories, try to limit foods that are fried, greasy, or high in fat or added sugar.  Eat protein, fruits, and vegetables  · Protein builds muscle, bone, skin, and blood. It helps your body heal and fight infection. It also helps boost your energy level.  · Good protein choices include yogurt, eggs, chicken, lean meats, beans, and peanut butter.  · Fruits and vegetables are full of important vitamins, minerals, and fiber to help your body function properly.  · Try to eat a variety of vegetables, fruits, whole grains, and beans.  · Ask your doctor about instant protein powder or other supplements.  Eating right during treatment  Side effects may make it a little harder to eat well on some days. The following tips will help you continue to get the nutrition you need:  · Be open to new foods and recipes.  · Eat small portions often and slowly.  · Have a healthy snack instead of a meal if you are not very hungry.  · Try eating in a new setting.  · Physical activity, such as walking, can help increase your appetite. Try to be active for at least 30 minutes each day.  · Boost your diet by getting the vitamins and minerals you need from fruits, vegetables, and whole grains.  · If you live alone and are not up to cooking, ask your healthcare provider about Meals on Wheels or other outreach programs.  · Sometimes, it is best to follow your appetitie. Eat when you are hungry, but when you ar enot, forcing yourself to eat can make you feel bad, nauseated, or even cause you to vomit.   Date Last Reviewed: 1/6/2016 © 2000-2017 [employer]. [address] [place] [address], [place] [address]. All rights reserved. This information is not intended as a substitute for professional medical care. Always follow your healthcare professional's instructions.      Chemotherapy, nutrition during

## 2019-02-28 NOTE — PLAN OF CARE
Problem: Adult Inpatient Plan of Care  Goal: Plan of Care Review  Outcome: Ongoing (interventions implemented as appropriate)  Pt states tired going into first tx.  No other c/o's.

## 2019-03-01 LAB — CANCER AG125 SERPL-ACNC: 1830 U/ML

## 2019-03-01 NOTE — NURSING
0930  3/1/19  Blood drawn on mediport access for My Risk testing.  [employer] called for pickup.  [employer] confirmation #[id number].

## 2019-03-04 ENCOUNTER — LAB VISIT (OUTPATIENT)
Dept: LAB | Facility: HOSPITAL | Age: 64
End: 2019-03-04
Attending: INTERNAL MEDICINE
Payer: COMMERCIAL

## 2019-03-04 ENCOUNTER — TELEPHONE (OUTPATIENT)
Dept: HEMATOLOGY/ONCOLOGY | Facility: CLINIC | Age: 64
End: 2019-03-04

## 2019-03-04 ENCOUNTER — OFFICE VISIT (OUTPATIENT)
Dept: HEMATOLOGY/ONCOLOGY | Facility: CLINIC | Age: 64
End: 2019-03-04
Payer: COMMERCIAL

## 2019-03-04 VITALS
OXYGEN SATURATION: 98 % | BODY MASS INDEX: 36.34 KG/M2 | HEART RATE: 86 BPM | TEMPERATURE: 98 F | WEIGHT: 232 LBS | SYSTOLIC BLOOD PRESSURE: 126 MMHG | DIASTOLIC BLOOD PRESSURE: 82 MMHG

## 2019-03-04 DIAGNOSIS — R19.7 DIARRHEA, UNSPECIFIED TYPE: ICD-10-CM

## 2019-03-04 DIAGNOSIS — C56.1 MALIGNANT NEOPLASM OF RIGHT OVARY: ICD-10-CM

## 2019-03-04 DIAGNOSIS — R11.2 NAUSEA AND VOMITING, INTRACTABILITY OF VOMITING NOT SPECIFIED, UNSPECIFIED VOMITING TYPE: ICD-10-CM

## 2019-03-04 DIAGNOSIS — C56.1 MALIGNANT NEOPLASM OF RIGHT OVARY: Primary | ICD-10-CM

## 2019-03-04 DIAGNOSIS — R11.2 NAUSEA AND VOMITING, INTRACTABILITY OF VOMITING NOT SPECIFIED, UNSPECIFIED VOMITING TYPE: Primary | ICD-10-CM

## 2019-03-04 LAB
ALBUMIN SERPL BCP-MCNC: 3.6 G/DL
ALP SERPL-CCNC: 115 U/L
ALT SERPL W/O P-5'-P-CCNC: 37 U/L
ANION GAP SERPL CALC-SCNC: 6 MMOL/L
AST SERPL-CCNC: 32 U/L
BASOPHILS # BLD AUTO: 0.02 K/UL
BASOPHILS NFR BLD: 0.4 %
BILIRUB SERPL-MCNC: 0.9 MG/DL
BUN SERPL-MCNC: 12 MG/DL
CALCIUM SERPL-MCNC: 10 MG/DL
CHLORIDE SERPL-SCNC: 103 MMOL/L
CO2 SERPL-SCNC: 31 MMOL/L
CREAT SERPL-MCNC: 0.8 MG/DL
DIFFERENTIAL METHOD: ABNORMAL
EOSINOPHIL # BLD AUTO: 0.4 K/UL
EOSINOPHIL NFR BLD: 6.8 %
ERYTHROCYTE [DISTWIDTH] IN BLOOD BY AUTOMATED COUNT: 15.6 %
EST. GFR  (AFRICAN AMERICAN): >60 ML/MIN/1.73 M^2
EST. GFR  (NON AFRICAN AMERICAN): >60 ML/MIN/1.73 M^2
GLUCOSE SERPL-MCNC: 107 MG/DL
HCT VFR BLD AUTO: 36.7 %
HGB BLD-MCNC: 11.5 G/DL
LYMPHOCYTES # BLD AUTO: 1.3 K/UL
LYMPHOCYTES NFR BLD: 21.9 %
MCH RBC QN AUTO: 26.7 PG
MCHC RBC AUTO-ENTMCNC: 31.3 G/DL
MCV RBC AUTO: 85 FL
MONOCYTES # BLD AUTO: 0 K/UL
MONOCYTES NFR BLD: 0.4 %
NEUTROPHILS # BLD AUTO: 4 K/UL
NEUTROPHILS NFR BLD: 70.5 %
PLATELET # BLD AUTO: 174 K/UL
PMV BLD AUTO: 10.5 FL
POTASSIUM SERPL-SCNC: 4.1 MMOL/L
PROT SERPL-MCNC: 6.9 G/DL
RBC # BLD AUTO: 4.3 M/UL
SODIUM SERPL-SCNC: 140 MMOL/L
WBC # BLD AUTO: 5.7 K/UL

## 2019-03-04 PROCEDURE — 99214 PR OFFICE/OUTPT VISIT, EST, LEVL IV, 30-39 MIN: ICD-10-PCS | Mod: S$GLB,,, | Performed by: NURSE PRACTITIONER

## 2019-03-04 PROCEDURE — 99214 OFFICE O/P EST MOD 30 MIN: CPT | Mod: S$GLB,,, | Performed by: NURSE PRACTITIONER

## 2019-03-04 PROCEDURE — 3074F PR MOST RECENT SYSTOLIC BLOOD PRESSURE < 130 MM HG: ICD-10-PCS | Mod: CPTII,S$GLB,, | Performed by: NURSE PRACTITIONER

## 2019-03-04 PROCEDURE — 3079F DIAST BP 80-89 MM HG: CPT | Mod: CPTII,S$GLB,, | Performed by: NURSE PRACTITIONER

## 2019-03-04 PROCEDURE — 99999 PR PBB SHADOW E&M-EST. PATIENT-LVL III: CPT | Mod: PBBFAC,,, | Performed by: NURSE PRACTITIONER

## 2019-03-04 PROCEDURE — 80053 COMPREHEN METABOLIC PANEL: CPT

## 2019-03-04 PROCEDURE — 3008F PR BODY MASS INDEX (BMI) DOCUMENTED: ICD-10-PCS | Mod: CPTII,S$GLB,, | Performed by: NURSE PRACTITIONER

## 2019-03-04 PROCEDURE — 3008F BODY MASS INDEX DOCD: CPT | Mod: CPTII,S$GLB,, | Performed by: NURSE PRACTITIONER

## 2019-03-04 PROCEDURE — 3079F PR MOST RECENT DIASTOLIC BLOOD PRESSURE 80-89 MM HG: ICD-10-PCS | Mod: CPTII,S$GLB,, | Performed by: NURSE PRACTITIONER

## 2019-03-04 PROCEDURE — 99999 PR PBB SHADOW E&M-EST. PATIENT-LVL III: ICD-10-PCS | Mod: PBBFAC,,, | Performed by: NURSE PRACTITIONER

## 2019-03-04 PROCEDURE — 36415 COLL VENOUS BLD VENIPUNCTURE: CPT

## 2019-03-04 PROCEDURE — 3074F SYST BP LT 130 MM HG: CPT | Mod: CPTII,S$GLB,, | Performed by: NURSE PRACTITIONER

## 2019-03-04 PROCEDURE — 85025 COMPLETE CBC W/AUTO DIFF WBC: CPT

## 2019-03-04 RX ORDER — ROSUVASTATIN CALCIUM 10 MG/1
10 TABLET, COATED ORAL DAILY
Qty: 90 TABLET | Refills: 1 | Status: SHIPPED | OUTPATIENT
Start: 2019-03-04 | End: 2019-10-29 | Stop reason: SDUPTHER

## 2019-03-04 NOTE — PROGRESS NOTES
Subjective:       Patient ID: Casie Gaines is a 63 y.o. female.    Chief Complaint: Nausea/Vomiting, Diarrhea    HPI: 63 y.o female with Ovarian cancer currently undergoing active chemotherapy (Avastin/Taxol/Carboplatin) under the care of Dr. Reeder. She presents today with complaints of nausea, vomiting, and diarrhea. Patient reports onset of nausea on yesterday with associated vomiting x 3 and diarrhea x 1. Patient reports nausea responsive to Zofran and Diarrhea responsive to Imodium. Reports intermittent lower abdominal pain. Reports able to tolerated foods without vomiting. Reports no nausea since 7 pm last night. Patient denies fever, chills, night sweats, SOB, CP  Social History     Socioeconomic History    Marital status:      Spouse name: Not on file    Number of children: Not on file    Years of education: Not on file    Highest education level: Not on file   Social Needs    Financial resource strain: Not on file    Food insecurity - worry: Not on file    Food insecurity - inability: Not on file    Transportation needs - medical: Not on file    Transportation needs - non-medical: Not on file   Occupational History    Not on file   Tobacco Use    Smoking status: Former Smoker     Packs/day: 1.00     Years: 25.00     Pack years: 25.00     Last attempt to quit: 10/1/2018     Years since quittin.4    Smokeless tobacco: Never Used    Tobacco comment: States started quit 2 months ago after 30 years   Substance and Sexual Activity    Alcohol use: Yes     Alcohol/week: 0.0 oz     Frequency: Never     Comment: occasionally  No alcohol 72h prior to sx    Drug use: No    Sexual activity: Not Currently     Partners: Male     Comment: hyst; mut monog   Other Topics Concern    Not on file   Social History Narrative    Not on file       Past Medical History:   Diagnosis Date    Anemia     Anxiety     Arthritis     knees    Cancer     ovarian    CHF (congestive heart failure)      Hyperlipemia     Hypertension     Neck pain        Family History   Problem Relation Age of Onset    Anesthesia problems Other     Breast cancer Maternal Aunt     Breast cancer Paternal Aunt     Ovarian cancer Paternal Aunt     Colon cancer Brother     Thrombophilia Neg Hx        Past Surgical History:   Procedure Laterality Date    breast reduction  10/02/2018     SECTION      X 1    CYSTOSCOPY, WITH URETERAL STENT INSERTION Right 2019    Performed by Timmy Santiago IV, MD at [facility] OR    DILATION AND CURETTAGE OF UTERUS      HYSTERECTOMY      RALH for fibroids (still has ovaries)    INSERTION, VENOUS ACCESS PORT Left 2019    Performed by Ulisses Monzon MD at [facility] OR    PLACEMENT, TRANSOBTURATOR TAPE N/A 2014    Performed by Sayra Aguilar MD at [facility] OR    PYELOGRAM, RETROGRADE Right 2019    Performed by Timmy Santiago IV, MD at [facility] OR    ROBOTIC HYSTERECTOMY N/A 2014    Performed by Sayra Aguilar MD at [facility] OR    TUBAL LIGATION         Review of Systems   Constitutional: Negative for activity change, appetite change, chills, diaphoresis, fatigue, fever and unexpected weight change.   HENT: Negative for congestion, mouth sores, nosebleeds, sore throat, trouble swallowing and voice change.    Eyes: Negative for photophobia and visual disturbance.   Respiratory: Negative for cough, chest tightness, shortness of breath and wheezing.    Cardiovascular: Negative for chest pain, palpitations and leg swelling.   Gastrointestinal: Positive for abdominal pain. Negative for abdominal distention, anal bleeding, blood in stool, constipation, diarrhea, nausea and vomiting.   Genitourinary: Negative for difficulty urinating, dysuria and hematuria.   Musculoskeletal: Negative for arthralgias, back pain and myalgias.   Skin: Negative for pallor, rash and wound.   Neurological: Positive for weakness. Negative for dizziness, syncope, facial asymmetry,  light-headedness, numbness and headaches.   Hematological: Negative for adenopathy. Does not bruise/bleed easily.   Psychiatric/Behavioral: The patient is nervous/anxious.          Medication List with Changes/Refills   Current Medications    ALBUTEROL 90 MCG/ACTUATION INHALER    Inhale 2 puffs into the lungs every 4 (four) hours as needed for Wheezing. Rescue    DEXAMETHASONE (DECADRON) 4 MG TAB    Decadron 20 mg (5 tablets) by mouth 12 and 6 hr prior to chemotherapy    HYDROCODONE-ACETAMINOPHEN (NORCO) 5-325 MG PER TABLET    Take 1 tablet by mouth every 6 (six) hours as needed for Pain.    LIDOCAINE-PRILOCAINE (EMLA) CREAM    Apply topically as needed.    MULTIVITAMIN WITH MINERALS TABLET    Take 1 tablet by mouth once daily.     NABUMETONE (RELAFEN) 500 MG TABLET    Take 1 tablet (500 mg total) by mouth 2 (two) times daily.    OLMESARTAN-HYDROCHLOROTHIAZIDE (BENICAR HCT) 40-12.5 MG TAB    Take 1 tablet by mouth once daily.    ONDANSETRON (ZOFRAN-ODT) 8 MG TBDL    Take 1 tablet (8 mg total) by mouth every 8 (eight) hours as needed.    OXYCODONE-ACETAMINOPHEN (PERCOCET)  MG PER TABLET    Take 1 tablet by mouth every 8 (eight) hours as needed.    PROCHLORPERAZINE (COMPAZINE) 10 MG TABLET    Take 1 tablet (10 mg total) by mouth every 6 (six) hours as needed.    ROSUVASTATIN (CRESTOR) 10 MG TABLET    Take 1 tablet (10 mg total) by mouth once daily.    TOLTERODINE (DETROL) 2 MG TAB    Take 1 tablet (2 mg total) by mouth 2 (two) times daily.    ZINC GLUCONATE 50 MG TABLET    Take 50 mg by mouth once daily.     Objective:     Vitals:    03/04/19 1040   BP: 126/82   Pulse: 86   Temp: 98.3 °F (36.8 °C)     Lab Results   Component Value Date    WBC 5.70 03/04/2019    HGB 11.5 (L) 03/04/2019    HCT 36.7 (L) 03/04/2019    MCV 85 03/04/2019     03/04/2019     CMP  Sodium   Date Value Ref Range Status   03/04/2019 140 136 - 145 mmol/L Final     Potassium   Date Value Ref Range Status   03/04/2019 4.1 3.5 - 5.1  mmol/L Final     Chloride   Date Value Ref Range Status   03/04/2019 103 95 - 110 mmol/L Final     CO2   Date Value Ref Range Status   03/04/2019 31 (H) 23 - 29 mmol/L Final     Glucose   Date Value Ref Range Status   03/04/2019 107 70 - 110 mg/dL Final     BUN, Bld   Date Value Ref Range Status   03/04/2019 12 8 - 23 mg/dL Final     Creatinine   Date Value Ref Range Status   03/04/2019 0.8 0.5 - 1.4 mg/dL Final     Calcium   Date Value Ref Range Status   03/04/2019 10.0 8.7 - 10.5 mg/dL Final     Total Protein   Date Value Ref Range Status   03/04/2019 6.9 6.0 - 8.4 g/dL Final     Albumin   Date Value Ref Range Status   03/04/2019 3.6 3.5 - 5.2 g/dL Final     Total Bilirubin   Date Value Ref Range Status   03/04/2019 0.9 0.1 - 1.0 mg/dL Final     Comment:     For infants and newborns, interpretation of results should be based  on gestational age, weight and in agreement with clinical  observations.  Premature Infant recommended reference ranges:  Up to 24 hours.............<8.0 mg/dL  Up to 48 hours............<12.0 mg/dL  3-5 days..................<15.0 mg/dL  6-29 days.................<15.0 mg/dL       Alkaline Phosphatase   Date Value Ref Range Status   03/04/2019 115 55 - 135 U/L Final     AST   Date Value Ref Range Status   03/04/2019 32 10 - 40 U/L Final     ALT   Date Value Ref Range Status   03/04/2019 37 10 - 44 U/L Final     Anion Gap   Date Value Ref Range Status   03/04/2019 6 (L) 8 - 16 mmol/L Final     eGFR if    Date Value Ref Range Status   03/04/2019 >60 >60 mL/min/1.73 m^2 Final     eGFR if non    Date Value Ref Range Status   03/04/2019 >60 >60 mL/min/1.73 m^2 Final     Comment:     Calculation used to obtain the estimated glomerular filtration  rate (eGFR) is the CKD-EPI equation.            Physical Exam   Constitutional: She is oriented to person, place, and time. She appears well-developed and well-nourished. She is cooperative.   HENT:   Head: Normocephalic.    Right Ear: Hearing normal. No drainage.   Left Ear: Hearing normal. No drainage.   Nose: Nose normal.   Mouth/Throat: Oropharynx is clear and moist.   Eyes: Conjunctivae, EOM and lids are normal. Right eye exhibits no discharge. Left eye exhibits no discharge. No scleral icterus.   Neck: Normal range of motion. No thyroid mass present.   Cardiovascular: Normal rate, regular rhythm and normal heart sounds.   No murmur heard.  Pulmonary/Chest: Effort normal and breath sounds normal. No respiratory distress. She has no wheezes. She has no rhonchi. She has no rales.   Abdominal: Soft. Bowel sounds are normal. She exhibits no distension. There is no tenderness.   Genitourinary:   Genitourinary Comments: deferred   Musculoskeletal: Normal range of motion. She exhibits no edema.   Lymphadenopathy:        Head (right side): No submandibular, no preauricular and no posterior auricular adenopathy present.        Head (left side): No submandibular, no preauricular and no posterior auricular adenopathy present.        Right cervical: No superficial cervical adenopathy present.       Left cervical: No superficial cervical adenopathy present.   Neurological: She is alert and oriented to person, place, and time.   Skin: Skin is warm, dry and intact.   Psychiatric: She has a normal mood and affect. Her speech is normal and behavior is normal. Thought content normal.   Vitals reviewed.       Assessment:     Problem List Items Addressed This Visit        Oncology    Malignant neoplasm of right ovary     Discussed foods to avoid. Discussed foods that may be helpful when nauseous or experiencing diarrhea           Other Visit Diagnoses     Nausea and vomiting, intractability of vomiting not specified, unspecified vomiting type    -  Primary    Diarrhea, unspecified type                Plan:     Nausea and vomiting, intractability of vomiting not specified, unspecified vomiting type    Diarrhea, unspecified type    Malignant neoplasm  of right ovary    Other orders  -     Cancel: CBC auto differential; Future; Expected date: 03/04/2019  -     Cancel: Comprehensive metabolic panel; Future; Expected date: 03/04/2019          I will review assessment/plan with collaborating physician     JAYDEN Sy

## 2019-03-04 NOTE — TELEPHONE ENCOUNTER
Has not had lipids checked in 1 year. Also, Dr. Ang wanted to see her back in a month. She has appt with Dr. Estrada 3/8. Please see if she can see Dr. Ang that day and do fasting labs?

## 2019-03-04 NOTE — ASSESSMENT & PLAN NOTE
CBC, CMP stable    Discussed foods to avoid. Discussed foods that may be helpful when nauseous or experiencing diarrhea.    No urgent need for ED evaluation. She will keep follow up appointment as previously scheduled with Dr. Trevizo 3/8/19. She knows to call sooner if questions, concerns, or worsening symptoms

## 2019-03-04 NOTE — TELEPHONE ENCOUNTER
Pt called stating that she has been having vomiting and diarrhea.  Pt states that she thinks she is dehydrated.  appt scheduled for pt to see FRANCES Pineda NP to discuss and possibly get IVF.  Pt verbalized understanding.

## 2019-03-05 NOTE — TELEPHONE ENCOUNTER
Called and spoke with patient. She is scheduled for a f/u 3/14. Can you put a lipid order in so I can link it to lab appointment on Friday?

## 2019-03-08 ENCOUNTER — LAB VISIT (OUTPATIENT)
Dept: LAB | Facility: HOSPITAL | Age: 64
End: 2019-03-08
Attending: INTERNAL MEDICINE
Payer: COMMERCIAL

## 2019-03-08 ENCOUNTER — OFFICE VISIT (OUTPATIENT)
Dept: HEMATOLOGY/ONCOLOGY | Facility: CLINIC | Age: 64
End: 2019-03-08
Payer: COMMERCIAL

## 2019-03-08 ENCOUNTER — OFFICE VISIT (OUTPATIENT)
Dept: CARDIOLOGY | Facility: CLINIC | Age: 64
End: 2019-03-08
Payer: COMMERCIAL

## 2019-03-08 VITALS
TEMPERATURE: 98 F | DIASTOLIC BLOOD PRESSURE: 83 MMHG | WEIGHT: 227.06 LBS | RESPIRATION RATE: 18 BRPM | HEART RATE: 83 BPM | HEIGHT: 67 IN | BODY MASS INDEX: 35.64 KG/M2 | OXYGEN SATURATION: 97 % | SYSTOLIC BLOOD PRESSURE: 148 MMHG

## 2019-03-08 VITALS
SYSTOLIC BLOOD PRESSURE: 130 MMHG | HEART RATE: 88 BPM | HEIGHT: 67 IN | BODY MASS INDEX: 35.64 KG/M2 | DIASTOLIC BLOOD PRESSURE: 80 MMHG | WEIGHT: 227.06 LBS

## 2019-03-08 DIAGNOSIS — C78.6 PERITONEAL CARCINOMATOSIS: ICD-10-CM

## 2019-03-08 DIAGNOSIS — C56.9 MALIGNANT NEOPLASM OF OVARY, UNSPECIFIED LATERALITY: ICD-10-CM

## 2019-03-08 DIAGNOSIS — I10 ESSENTIAL HYPERTENSION: ICD-10-CM

## 2019-03-08 DIAGNOSIS — Z86.79 HISTORY OF CHF (CONGESTIVE HEART FAILURE): ICD-10-CM

## 2019-03-08 DIAGNOSIS — C56.1 MALIGNANT NEOPLASM OF RIGHT OVARY: ICD-10-CM

## 2019-03-08 DIAGNOSIS — C56.1 MALIGNANT NEOPLASM OF RIGHT OVARY: Primary | ICD-10-CM

## 2019-03-08 DIAGNOSIS — R06.09 DOE (DYSPNEA ON EXERTION): ICD-10-CM

## 2019-03-08 DIAGNOSIS — Z01.810 PREOP CARDIOVASCULAR EXAM: ICD-10-CM

## 2019-03-08 DIAGNOSIS — R94.31 ABNORMAL ECG: Primary | ICD-10-CM

## 2019-03-08 LAB
ALBUMIN SERPL BCP-MCNC: 3.8 G/DL
ALP SERPL-CCNC: 120 U/L
ALT SERPL W/O P-5'-P-CCNC: 30 U/L
ANION GAP SERPL CALC-SCNC: 10 MMOL/L
AST SERPL-CCNC: 27 U/L
BASOPHILS # BLD AUTO: 0.03 K/UL
BASOPHILS NFR BLD: 0.7 %
BILIRUB SERPL-MCNC: 0.5 MG/DL
BUN SERPL-MCNC: 10 MG/DL
CALCIUM SERPL-MCNC: 10.1 MG/DL
CHLORIDE SERPL-SCNC: 106 MMOL/L
CO2 SERPL-SCNC: 26 MMOL/L
CREAT SERPL-MCNC: 0.8 MG/DL
DIFFERENTIAL METHOD: ABNORMAL
EOSINOPHIL # BLD AUTO: 0.1 K/UL
EOSINOPHIL NFR BLD: 2.5 %
ERYTHROCYTE [DISTWIDTH] IN BLOOD BY AUTOMATED COUNT: 15.4 %
EST. GFR  (AFRICAN AMERICAN): >60 ML/MIN/1.73 M^2
EST. GFR  (NON AFRICAN AMERICAN): >60 ML/MIN/1.73 M^2
GLUCOSE SERPL-MCNC: 101 MG/DL
HCT VFR BLD AUTO: 35.4 %
HGB BLD-MCNC: 11.3 G/DL
LYMPHOCYTES # BLD AUTO: 1.2 K/UL
LYMPHOCYTES NFR BLD: 30.6 %
MCH RBC QN AUTO: 26.5 PG
MCHC RBC AUTO-ENTMCNC: 31.9 G/DL
MCV RBC AUTO: 83 FL
MONOCYTES # BLD AUTO: 0.1 K/UL
MONOCYTES NFR BLD: 1.7 %
NEUTROPHILS # BLD AUTO: 2.6 K/UL
NEUTROPHILS NFR BLD: 64.5 %
PLATELET # BLD AUTO: 190 K/UL
PMV BLD AUTO: 9.9 FL
POTASSIUM SERPL-SCNC: 4.1 MMOL/L
PROT SERPL-MCNC: 7.2 G/DL
RBC # BLD AUTO: 4.27 M/UL
SODIUM SERPL-SCNC: 142 MMOL/L
WBC # BLD AUTO: 4.05 K/UL

## 2019-03-08 PROCEDURE — 99999 PR PBB SHADOW E&M-EST. PATIENT-LVL III: ICD-10-PCS | Mod: PBBFAC,,, | Performed by: INTERNAL MEDICINE

## 2019-03-08 PROCEDURE — 99214 PR OFFICE/OUTPT VISIT, EST, LEVL IV, 30-39 MIN: ICD-10-PCS | Mod: S$GLB,,, | Performed by: INTERNAL MEDICINE

## 2019-03-08 PROCEDURE — 3074F SYST BP LT 130 MM HG: CPT | Mod: CPTII,S$GLB,, | Performed by: INTERNAL MEDICINE

## 2019-03-08 PROCEDURE — 85025 COMPLETE CBC W/AUTO DIFF WBC: CPT

## 2019-03-08 PROCEDURE — 99244 OFF/OP CNSLTJ NEW/EST MOD 40: CPT | Mod: S$GLB,,, | Performed by: INTERNAL MEDICINE

## 2019-03-08 PROCEDURE — 3074F PR MOST RECENT SYSTOLIC BLOOD PRESSURE < 130 MM HG: ICD-10-PCS | Mod: CPTII,S$GLB,, | Performed by: INTERNAL MEDICINE

## 2019-03-08 PROCEDURE — 3008F PR BODY MASS INDEX (BMI) DOCUMENTED: ICD-10-PCS | Mod: CPTII,S$GLB,, | Performed by: INTERNAL MEDICINE

## 2019-03-08 PROCEDURE — 99999 PR PBB SHADOW E&M-EST. PATIENT-LVL IV: CPT | Mod: PBBFAC,,, | Performed by: INTERNAL MEDICINE

## 2019-03-08 PROCEDURE — 3079F DIAST BP 80-89 MM HG: CPT | Mod: CPTII,S$GLB,, | Performed by: INTERNAL MEDICINE

## 2019-03-08 PROCEDURE — 36415 COLL VENOUS BLD VENIPUNCTURE: CPT

## 2019-03-08 PROCEDURE — 3008F BODY MASS INDEX DOCD: CPT | Mod: CPTII,S$GLB,, | Performed by: INTERNAL MEDICINE

## 2019-03-08 PROCEDURE — 80053 COMPREHEN METABOLIC PANEL: CPT

## 2019-03-08 PROCEDURE — 99999 PR PBB SHADOW E&M-EST. PATIENT-LVL IV: ICD-10-PCS | Mod: PBBFAC,,, | Performed by: INTERNAL MEDICINE

## 2019-03-08 PROCEDURE — 99244 PR OFFICE CONSULTATION,LEVEL IV: ICD-10-PCS | Mod: S$GLB,,, | Performed by: INTERNAL MEDICINE

## 2019-03-08 PROCEDURE — 3079F PR MOST RECENT DIASTOLIC BLOOD PRESSURE 80-89 MM HG: ICD-10-PCS | Mod: CPTII,S$GLB,, | Performed by: INTERNAL MEDICINE

## 2019-03-08 PROCEDURE — 99214 OFFICE O/P EST MOD 30 MIN: CPT | Mod: S$GLB,,, | Performed by: INTERNAL MEDICINE

## 2019-03-08 PROCEDURE — 99999 PR PBB SHADOW E&M-EST. PATIENT-LVL III: CPT | Mod: PBBFAC,,, | Performed by: INTERNAL MEDICINE

## 2019-03-08 NOTE — LETTER
March 8, 2019      Rossana Ang MD  [address] [place] [id number]           [facility]  [address] [place] [id number]  Phone: [phone]  Fax: [phone]          Patient: Casie Gaines   MR Number: [id number]   YOB: 1955   Date of Visit: 3/8/2019       Dear Dr. Rossana Ang:    Thank you for referring Casie Gaines to me for evaluation. Attached you will find relevant portions of my assessment and plan of care.    If you have questions, please do not hesitate to call me. I look forward to following Casie Gaines along with you.    Sincerely,    Blake Estrada MD    Enclosure  CC:  No Recipients    If you would like to receive this communication electronically, please contact [email or web address] or [phone] to request more information on [employer] Link access.    For providers and/or their staff who would like to refer a patient to [facility], please contact us through our one-stop-shop provider referral line, [facility], at [phone].    If you feel you have received this communication in error or would no longer like to receive these types of communications, please e-mail [email or web address]

## 2019-03-08 NOTE — PROGRESS NOTES
Subjective:    Patient ID:  Casie Gaines is a 63 y.o. female who presents for evaluation of Pre-op Exam; Hypertension; Congestive Heart Failure; and Hyperlipidemia      Pt referred by Dr. Rossana Ang    HPI  Pt presents for cardiac eval.  Her current med conditions include stage IV ovarian cancer, malignant pleural effusions, s/p R ureteral stent  for hydronephrosis, HTN, hyperlipidemia.  Former smoker.   Carries a dx of prior CHF.   Father  of MI age 62.   ecg 2/15/19 normal.  ecg 2019 NSR, nonspecific st-t abnl.  She had appt with Cards earlier this year but was cancelled.  She is already s/p port placement, and plans to remove later this year.  No cardiac complications with her port placement last month.  She denies cp sxs.  She has chronic DAVIDSON, for years.  No pnd/orthopnea.  Would have hard time going up 2 flights of stairs.  On statin, lipids controlled, LDL < 100.      Post Menstrual Age: <Gestational Age is not set>    Current Outpatient Medications:     dexamethasone (DECADRON) 4 MG Tab, Decadron 20 mg (5 tablets) by mouth 12 and 6 hr prior to chemotherapy, Disp: 20 tablet, Rfl: 3    HYDROcodone-acetaminophen (NORCO) 5-325 mg per tablet, Take 1 tablet by mouth every 6 (six) hours as needed for Pain., Disp: 10 tablet, Rfl: 0    lidocaine-prilocaine (EMLA) cream, Apply topically as needed., Disp: 30 g, Rfl: 1    multivitamin with minerals tablet, Take 1 tablet by mouth once daily. , Disp: , Rfl:     nabumetone (RELAFEN) 500 MG tablet, Take 1 tablet (500 mg total) by mouth 2 (two) times daily., Disp: 60 tablet, Rfl: 1    olmesartan-hydrochlorothiazide (BENICAR HCT) 40-12.5 mg Tab, Take 1 tablet by mouth once daily., Disp: 90 tablet, Rfl: 1    ondansetron (ZOFRAN-ODT) 8 MG TbDL, Take 1 tablet (8 mg total) by mouth every 8 (eight) hours as needed., Disp: 30 tablet, Rfl: 5    oxyCODONE-acetaminophen (PERCOCET)  mg per tablet, Take 1 tablet by mouth every 8 (eight) hours as  "needed., Disp: 20 tablet, Rfl: 0    prochlorperazine (COMPAZINE) 10 MG tablet, Take 1 tablet (10 mg total) by mouth every 6 (six) hours as needed., Disp: 30 tablet, Rfl: 5    rosuvastatin (CRESTOR) 10 MG tablet, Take 1 tablet (10 mg total) by mouth once daily., Disp: 90 tablet, Rfl: 1    zinc gluconate 50 mg tablet, Take 50 mg by mouth once daily., Disp: , Rfl:     albuterol 90 mcg/actuation inhaler, Inhale 2 puffs into the lungs every 4 (four) hours as needed for Wheezing. Rescue, Disp: 18 g, Rfl: 0    tolterodine (DETROL) 2 MG Tab, Take 1 tablet (2 mg total) by mouth 2 (two) times daily., Disp: 60 tablet, Rfl: 11  Past Medical History:   Diagnosis Date    Anemia     Anxiety     Arthritis     knees    Cancer     ovarian    CHF (congestive heart failure)     Hyperlipemia     Hypertension     Neck pain        Review of Systems   Constitution: Negative.   HENT: Negative.    Eyes: Negative.    Cardiovascular: Positive for dyspnea on exertion.   Respiratory: Positive for shortness of breath.    Endocrine: Negative.    Hematologic/Lymphatic: Negative.    Skin: Negative.    Musculoskeletal: Negative.    Gastrointestinal: Negative.    Genitourinary: Negative.    Neurological: Negative.    Psychiatric/Behavioral: Negative.    Allergic/Immunologic: Negative.        /80 (BP Location: Left arm, Patient Position: Sitting, BP Method: Medium (Manual))   Pulse 88   Ht 5' 7" (1.702 m)   Wt 103 kg (227 lb 1.2 oz)   BMI 35.56 kg/m²     Wt Readings from Last 3 Encounters:   03/08/19 103 kg (227 lb 1.2 oz)   03/08/19 103 kg (227 lb 1.2 oz)   03/04/19 105.2 kg (232 lb)     Temp Readings from Last 3 Encounters:   03/08/19 98.1 °F (36.7 °C)   03/04/19 98.3 °F (36.8 °C) (Oral)   02/28/19 97.6 °F (36.4 °C) (Oral)     BP Readings from Last 3 Encounters:   03/08/19 130/80   03/08/19 (!) 148/83   03/04/19 126/82     Pulse Readings from Last 3 Encounters:   03/08/19 88   03/08/19 83   03/04/19 86          Objective:    " Physical Exam   Constitutional: She is oriented to person, place, and time. Vital signs are normal. She appears well-developed and well-nourished. She is active and cooperative. She does not have a sickly appearance. She does not appear ill. No distress.   HENT:   Head: Normocephalic.   Neck: Neck supple. Normal carotid pulses, no hepatojugular reflux and no JVD present. Carotid bruit is not present. No thyromegaly present.   Cardiovascular: Normal rate, regular rhythm, S1 normal, S2 normal, normal heart sounds and normal pulses. PMI is not displaced. Exam reveals no gallop and no friction rub.   No murmur heard.  Pulses:       Radial pulses are 2+ on the right side, and 2+ on the left side.   Pulmonary/Chest: Effort normal and breath sounds normal. She has no wheezes. She has no rales.   Abdominal: Soft. Normal appearance, normal aorta and bowel sounds are normal. She exhibits no pulsatile liver, no abdominal bruit, no ascites and no mass. There is no splenomegaly or hepatomegaly. There is no tenderness.   Musculoskeletal: She exhibits no edema.   Lymphadenopathy:     She has no cervical adenopathy.   Neurological: She is alert and oriented to person, place, and time.   Skin: Skin is warm. She is not diaphoretic.   Psychiatric: She has a normal mood and affect. Her behavior is normal.   Nursing note and vitals reviewed.      I have reviewed all pertinent labs and cardiac studies.      Chemistry        Component Value Date/Time     03/08/2019 1005    K 4.1 03/08/2019 1005     03/08/2019 1005    CO2 26 03/08/2019 1005    BUN 10 03/08/2019 1005    CREATININE 0.8 03/08/2019 1005     03/08/2019 1005        Component Value Date/Time    CALCIUM 10.1 03/08/2019 1005    ALKPHOS 120 03/08/2019 1005    AST 27 03/08/2019 1005    ALT 30 03/08/2019 1005    BILITOT 0.5 03/08/2019 1005    ESTGFRAFRICA >60 03/08/2019 1005    EGFRNONAA >60 03/08/2019 1005        Lab Results   Component Value Date    WBC 4.05  03/08/2019    HGB 11.3 (L) 03/08/2019    HCT 35.4 (L) 03/08/2019    MCV 83 03/08/2019     03/08/2019     No results found for: LABA1C, HGBA1C    Lab Results   Component Value Date    CHOL 171 02/09/2018     Lab Results   Component Value Date    HDL 66 02/09/2018     Lab Results   Component Value Date    LDLCALC 97.0 02/09/2018     Lab Results   Component Value Date    TRIG 40 02/09/2018     Lab Results   Component Value Date    CHOLHDL 38.6 02/09/2018           Assessment:       1. Abnormal ECG    2. History of CHF (congestive heart failure)    3. Essential hypertension    4. Malignant neoplasm of right ovary    5. Preop cardiovascular exam    6. DAVIDSON (dyspnea on exertion)         Plan:             Stress MPI to evaluate for coronary ischemia.  Echocardiogram to assess LV function.  BNP  Continue current meds.  Pt counseled on risk factor modification.  Cardiac diet  Pt may proceed with port removal at low CV risk.  Pt experienced no cardiac complications with port placement.  Continue f/u with heme/onc on ovarian cancer.   Phone review for test results.

## 2019-03-08 NOTE — PROGRESS NOTES
Hematology/Oncology Office Note    Cc:  Follow-up on pathology    Diagnosis:  Stage IV ovarian cancer  peritoneal carcinomatosis with malignant pleural effusion    Treatment:  02/28/2019 carboplatin/Taxol/Avastin cycle 1 day 1    Ms. Gaines is a 63-year-old female with PMH significant for HTN, hyperlipidemia, presents to [facility] ED complaining of progressively worsening shortness of breath associated with left abdominal discomfort.  She reports chills but no fever.  Complains of shortness of breath without cough, congestion.  Denies hemoptysis or recent weight loss.  In the ED chest x-ray revealed large right sided pleural effusion.  CT abdomen revealed numerous soft tissue masses within the left kimani abdomen, suggestive of peritoneal carcinomatosis.  Patient has no known history of malignancy.      Patient underwent CT-guided biopsy of omental mass which demonstrated carcinoma consistent with ovarian primary.  In addition cytology from thoracentesis was positive for malignancy.  She underwent IVP for right-sided hydronephrosis and the patient underwent right ureteral stenting by Dr. Santiago.    I have reviewed and updated the HPI, ROS, PMHx, Social Hx, Family Hx and treatment history.    Today's visit:  Patient is status post cycle 1 of carbo Taxol/Avastin and presents midcycle to check counts.  She had 1 episode nausea/vomiting/diarrhea which resolved in 24-48 hours.      Treatment:    Surveillance:      Past Medical History:   Diagnosis Date    Anemia     Anxiety     Arthritis     knees    Cancer     ovarian    CHF (congestive heart failure)     Hyperlipemia     Hypertension     Neck pain          Social History:  Former smoker quit in 10/2008  No alcohol or illicit drugs      Family History: family history includes Anesthesia problems in her other; Breast cancer in her maternal aunt and paternal aunt; Colon cancer in her brother; Ovarian cancer in her paternal aunt.        HPI    Review of  "Systems   Constitutional: Positive for activity change and fatigue. Negative for appetite change, chills, diaphoresis, fever and unexpected weight change.   HENT: Negative for congestion, drooling, ear discharge, ear pain, hearing loss, rhinorrhea, sinus pressure, sinus pain, sore throat and tinnitus.    Eyes: Negative.    Respiratory: Negative for apnea, cough, choking and shortness of breath.    Cardiovascular: Negative for chest pain.   Gastrointestinal: Positive for abdominal distention and abdominal pain. Negative for blood in stool, constipation, diarrhea, nausea, rectal pain and vomiting.   Endocrine: Negative.    Genitourinary: Negative for enuresis, flank pain, frequency, genital sores, hematuria and menstrual problem.        Right flank pain   Musculoskeletal: Negative for arthralgias, back pain, gait problem, joint swelling, myalgias, neck pain and neck stiffness.   Skin: Negative for color change, pallor, rash and wound.   Allergic/Immunologic: Negative.    Neurological: Negative for dizziness, tremors, seizures, syncope, facial asymmetry, speech difficulty, weakness, light-headedness and numbness.   Hematological: Negative for adenopathy. Does not bruise/bleed easily.   Psychiatric/Behavioral: Negative for agitation, behavioral problems, confusion, decreased concentration, dysphoric mood and hallucinations. The patient is not nervous/anxious and is not hyperactive.        Objective:         Vitals:    03/08/19 1014   BP: (!) 148/83   Pulse: 83   Resp: 18   Temp: 98.1 °F (36.7 °C)   SpO2: 97%   Weight: 103 kg (227 lb 1.2 oz)   Height: 5' 7" (1.702 m)     Physical Exam   Constitutional: She is oriented to person, place, and time. She appears well-developed and well-nourished. No distress.   Well groomed   HENT:   Head: Normocephalic and atraumatic.   Nose: Nose normal.   Mouth/Throat: Oropharynx is clear and moist. No oropharyngeal exudate.   Eyes: Conjunctivae and EOM are normal. Pupils are equal, round, " and reactive to light.   Neck: Normal range of motion. Neck supple. No JVD present. No tracheal deviation present. No thyromegaly present.   Cardiovascular: Normal rate, regular rhythm, normal heart sounds and intact distal pulses. Exam reveals no friction rub.   No murmur heard.  Pulmonary/Chest: Effort normal and breath sounds normal. No accessory muscle usage or stridor. No respiratory distress. She has no decreased breath sounds. She has no wheezes. She has no rhonchi. She has no rales. She exhibits no tenderness.   Abdominal: Soft. Bowel sounds are normal. There is no tenderness. There is no rebound and no guarding.   Musculoskeletal: Normal range of motion. She exhibits no edema.   Lymphadenopathy:     She has no cervical adenopathy.   Neurological: She is alert and oriented to person, place, and time. No cranial nerve deficit. She exhibits normal muscle tone.   Skin: Skin is warm, dry and intact. Capillary refill takes less than 2 seconds. No rash noted. Rash is not macular, not pustular and not urticarial. She is not diaphoretic. No pallor.   Psychiatric: She has a normal mood and affect. Her behavior is normal. Judgment and thought content normal.       Assessment:       63-year-old female status post hospitalization for new diagnosis of peritoneal carcinomatosis with malignant right pleural effusion.  Final pathology is consistent with ovarian primary with CA 2740.  PET scan demonstrates multiple omental and peritoneal avid masses consistent with peritoneal carcinomatosis, extensive retroperitoneal and mediastinal lymphadenopathy, masslike structure in the right iliac fossa likely representing ovarian mass.  Patient has been evaluated by GYN Oncology Dr. Juarez and MD Green with recommendations for him carboplatin/Taxol/Avastin.  She will follow up with Dr. Juarez after cycle 3.    She is status post cycle 1 of carboplatin/Taxol/Avastin on 02/28/2019 with 1 episode of nausea/vomiting/diarrhea which  occurred 4-5 days following treatment and sounds like viral gastroenteritis.  It resolved within 24-48 hours without residual GI side effects.  We will proceed with next cycle on 03/21/2019      Stage IV ovarian cancer/peritoneal carcinomatosis with malignant right pleural effusion:  --carboplatin/Taxol/Avastin Q 3 weeks  --cycle 1 day 1 02/28/2019  --follow-up on 03/21/2019 for cycle 2  --BRCA mutation study via [facility] pending  --f/u with Dr Juarez following cycle 3

## 2019-03-11 ENCOUNTER — TELEPHONE (OUTPATIENT)
Dept: HEMATOLOGY/ONCOLOGY | Facility: CLINIC | Age: 64
End: 2019-03-11

## 2019-03-11 NOTE — TELEPHONE ENCOUNTER
----- Message from Dacia Ang sent at 3/11/2019 11:16 AM CDT -----  Michelle hagan/ Sammy Godinez states that she has some question regarding pt. Caller states that she need confirming that pt is a pt here, her diagnosis and how long she will be out of work for.   [phone] ext [phone] caller states that if she dont answer a voicemail can be left cause its private.

## 2019-03-14 ENCOUNTER — OFFICE VISIT (OUTPATIENT)
Dept: INTERNAL MEDICINE | Facility: CLINIC | Age: 64
End: 2019-03-14
Payer: COMMERCIAL

## 2019-03-14 VITALS
DIASTOLIC BLOOD PRESSURE: 70 MMHG | HEART RATE: 89 BPM | OXYGEN SATURATION: 98 % | TEMPERATURE: 97 F | WEIGHT: 234.81 LBS | HEIGHT: 67 IN | SYSTOLIC BLOOD PRESSURE: 130 MMHG | BODY MASS INDEX: 36.85 KG/M2

## 2019-03-14 DIAGNOSIS — C56.1 MALIGNANT NEOPLASM OF RIGHT OVARY: Primary | ICD-10-CM

## 2019-03-14 DIAGNOSIS — C78.6 PERITONEAL CARCINOMATOSIS: ICD-10-CM

## 2019-03-14 DIAGNOSIS — I10 ESSENTIAL HYPERTENSION: ICD-10-CM

## 2019-03-14 DIAGNOSIS — C56.9 MALIGNANT NEOPLASM OF OVARY, UNSPECIFIED LATERALITY: ICD-10-CM

## 2019-03-14 PROBLEM — R19.00 PELVIC MASS: Status: RESOLVED | Noted: 2019-01-28 | Resolved: 2019-03-14

## 2019-03-14 PROCEDURE — 3078F PR MOST RECENT DIASTOLIC BLOOD PRESSURE < 80 MM HG: ICD-10-PCS | Mod: CPTII,S$GLB,, | Performed by: FAMILY MEDICINE

## 2019-03-14 PROCEDURE — 99999 PR PBB SHADOW E&M-EST. PATIENT-LVL III: CPT | Mod: PBBFAC,,, | Performed by: FAMILY MEDICINE

## 2019-03-14 PROCEDURE — 99213 OFFICE O/P EST LOW 20 MIN: CPT | Mod: S$GLB,,, | Performed by: FAMILY MEDICINE

## 2019-03-14 PROCEDURE — 3008F BODY MASS INDEX DOCD: CPT | Mod: CPTII,S$GLB,, | Performed by: FAMILY MEDICINE

## 2019-03-14 PROCEDURE — 3078F DIAST BP <80 MM HG: CPT | Mod: CPTII,S$GLB,, | Performed by: FAMILY MEDICINE

## 2019-03-14 PROCEDURE — 3075F SYST BP GE 130 - 139MM HG: CPT | Mod: CPTII,S$GLB,, | Performed by: FAMILY MEDICINE

## 2019-03-14 PROCEDURE — 3075F PR MOST RECENT SYSTOLIC BLOOD PRESS GE 130-139MM HG: ICD-10-PCS | Mod: CPTII,S$GLB,, | Performed by: FAMILY MEDICINE

## 2019-03-14 PROCEDURE — 99999 PR PBB SHADOW E&M-EST. PATIENT-LVL III: ICD-10-PCS | Mod: PBBFAC,,, | Performed by: FAMILY MEDICINE

## 2019-03-14 PROCEDURE — 99213 PR OFFICE/OUTPT VISIT, EST, LEVL III, 20-29 MIN: ICD-10-PCS | Mod: S$GLB,,, | Performed by: FAMILY MEDICINE

## 2019-03-14 PROCEDURE — 3008F PR BODY MASS INDEX (BMI) DOCUMENTED: ICD-10-PCS | Mod: CPTII,S$GLB,, | Performed by: FAMILY MEDICINE

## 2019-03-14 NOTE — PROGRESS NOTES
Casie Frias Givens  2019  [id number]    Rossana Ang MD  Patient Care Team:  Rossana Ang MD as PCP - General (Family Medicine)  Radha Foley LPN as Care Coordinator (Internal Medicine)  Has the patient seen any provider outside of the [facility] network since the last visit? (no). If yes, HIPPA forms completed and records requested.        Visit Type:a scheduled routine follow-up visit    Chief Complaint:  Chief Complaint   Patient presents with    Follow-up       History of Present Illness:  63 year old here for follow up.  Her current med conditions include stage IV ovarian cancer, malignant pleural effusions, s/p R ureteral stent 2019 for hydronephrosis, HTN, hyperlipidemia.  Former smoker.   Carries a dx of prior CHF.     Recent port placement.   SOB had improved after thoracentesis of the pleural effusion.   Patient underwent CT-guided biopsy of omental mass which demonstrated carcinoma consistent with ovarian primary.  In addition cytology from thoracentesis was positive for malignancy.  She underwent IVP for right-sided hydronephrosis and the patient underwent right ureteral stenting by Dr. Santiago. The Stent has been uncomfortable. She did seek second opinion on stent.    She is followed by Dr. Trevizo, and started taxol/Avastatin.  She has labs scheduled next week to check her blood count.    She did see Dr. Estrada for Cardiac work up.   Stress test scheduled, echo.     History:  Past Medical History:   Diagnosis Date    Anemia     Anxiety     Arthritis     knees    Cancer     ovarian    CHF (congestive heart failure)     Hyperlipemia     Hypertension     Neck pain      Past Surgical History:   Procedure Laterality Date    breast reduction  10/02/2018     SECTION      X 1    CYSTOSCOPY, WITH URETERAL STENT INSERTION Right 2019    [facility]    DILATION AND CURETTAGE OF UTERUS      HYSTERECTOMY      RALH for fibroids (still  has ovaries)    INSERTION, VENOUS ACCESS PORT Left 2019    Per[facility]es [facility]zon [facility] at [facility]    PLACEMENT, TRANSOBTURATOR TAPE N/A 2014    Performed by Sayra Aguilar MD at [facility] OR    PYELOGRAM, RETROGRADE Right 2019    Performed by [facility] at [facility] OR    ROBOTIC HYSTERECTOMY N/A 2014    Performed by [facility] at [facility] OR    TUBAL LIGATION       Family History   Problem Relation Age of Onset    Anesthesia problems Other     Breast cancer Maternal Aunt     Breast cancer Paternal Aunt     Ovarian cancer Paternal Aunt     Colon cancer Brother     Thrombophilia Neg Hx      Social History     Socioeconomic History    Marital status:      Spouse name: Not on file    Number of children: Not on file    Years of education: Not on file    Highest education level: Not on file   Social Needs    Financial resource strain: Not on file    Food insecurity - worry: Not on file    Food insecurity - inability: Not on file    Transportation needs - medical: Not on file    Transportation needs - non-medical: Not on file   Occupational History    Not on file   Tobacco Use    Smoking status: Former Smoker     Packs/day: 1.00     Years: 25.00     Pack years: 25.00     Last attempt to quit: 10/1/2018     Years since quittin.4    Smokeless tobacco: Never Used    Tobacco comment: States started quit 2 months ago after 30 years   Substance and Sexual Activity    Alcohol use: Yes     Alcohol/week: 0.0 oz     Frequency: Never     Comment: occasionally  No alcohol 72h prior to sx    Drug use: No    Sexual activity: Not Currently     Partners: Male     Comment: hyst; mut monog   Other Topics Concern    Not on file   Social History Narrative    Not on file     Patient Active Problem List   Diagnosis    Hypertension    Osteoarthritis of both knees    History of CHF (congestive heart failure)    Hyperlipidemia    Pleural effusion on right     Peritoneal carcinomatosis    Morbid obesity    History of renal stent    Preop cardiovascular exam    Abnormal ECG    Malignant neoplasm of right ovary    Ovarian cancer    DAVIDSON (dyspnea on exertion)     Review of patient's allergies indicates:  No Known Allergies    The following were reviewed at this visit: active problem list, medication list, allergies, family history, social history, and health maintenance.    Medications:  Current Outpatient Medications on File Prior to Visit   Medication Sig Dispense Refill    dexamethasone (DECADRON) 4 MG Tab Decadron 20 mg (5 tablets) by mouth 12 and 6 hr prior to chemotherapy 20 tablet 3    HYDROcodone-acetaminophen (NORCO) 5-325 mg per tablet Take 1 tablet by mouth every 6 (six) hours as needed for Pain. 10 tablet 0    lidocaine-prilocaine (EMLA) cream Apply topically as needed. 30 g 1    multivitamin with minerals tablet Take 1 tablet by mouth once daily.       olmesartan-hydrochlorothiazide (BENICAR HCT) 40-12.5 mg Tab Take 1 tablet by mouth once daily. 90 tablet 1    ondansetron (ZOFRAN-ODT) 8 MG TbDL Take 1 tablet (8 mg total) by mouth every 8 (eight) hours as needed. 30 tablet 5    oxyCODONE-acetaminophen (PERCOCET)  mg per tablet Take 1 tablet by mouth every 8 (eight) hours as needed. 20 tablet 0    rosuvastatin (CRESTOR) 10 MG tablet Take 1 tablet (10 mg total) by mouth once daily. 90 tablet 1    tolterodine (DETROL) 2 MG Tab Take 1 tablet (2 mg total) by mouth 2 (two) times daily. 60 tablet 11    zinc gluconate 50 mg tablet Take 50 mg by mouth once daily.      albuterol 90 mcg/actuation inhaler Inhale 2 puffs into the lungs every 4 (four) hours as needed for Wheezing. Rescue 18 g 0    [] nabumetone (RELAFEN) 500 MG tablet Take 1 tablet (500 mg total) by mouth 2 (two) times daily. 60 tablet 1    prochlorperazine (COMPAZINE) 10 MG tablet Take 1 tablet (10 mg total) by mouth every 6 (six) hours as needed. 30 tablet 5     No current  facility-administered medications on file prior to visit.        Medications have been reviewed and reconciled with patient at this visit.  Barriers to medications present (no)    Adverse reactions to current medications (no)    Over the counter medications reviewed (Yes ), and if needed added to active Medication list at this visit.     Exam:  Wt Readings from Last 3 Encounters:   03/14/19 106.5 kg (234 lb 12.6 oz)   03/08/19 103 kg (227 lb 1.2 oz)   03/08/19 103 kg (227 lb 1.2 oz)     Temp Readings from Last 3 Encounters:   03/14/19 96.7 °F (35.9 °C) (Tympanic)   03/08/19 98.1 °F (36.7 °C)   03/04/19 98.3 °F (36.8 °C) (Oral)     BP Readings from Last 3 Encounters:   03/14/19 130/70   03/08/19 130/80   03/08/19 (!) 148/83     Pulse Readings from Last 3 Encounters:   03/14/19 89   03/08/19 88   03/08/19 83     Body mass index is 36.77 kg/m².      Review of Systems   Constitutional: Negative.  Negative for chills and fever.   HENT: Negative.  Negative for congestion, sinus pain and sore throat.    Eyes: Negative for blurred vision and double vision.   Respiratory: Negative for cough, sputum production, shortness of breath and wheezing.    Cardiovascular: Negative for chest pain, palpitations and leg swelling.   Gastrointestinal: Negative for abdominal pain, constipation, diarrhea, heartburn, nausea and vomiting.   Genitourinary: Negative.    Musculoskeletal: Negative.    Skin: Negative.  Negative for rash.   Neurological: Negative.    Endo/Heme/Allergies: Negative.  Negative for polydipsia. Does not bruise/bleed easily.   Psychiatric/Behavioral: Negative for depression and substance abuse.     Physical Exam   Constitutional: She is oriented to person, place, and time. She appears well-developed and well-nourished. No distress.   HENT:   Head: Normocephalic and atraumatic.   Right Ear: External ear normal.   Left Ear: External ear normal.   Nose: Nose normal.   Mouth/Throat: Oropharynx is clear and moist. No  oropharyngeal exudate.   Eyes: Conjunctivae and EOM are normal. Pupils are equal, round, and reactive to light. Right eye exhibits no discharge. Left eye exhibits no discharge.   Neck: Normal range of motion. Neck supple. No thyromegaly present.   Cardiovascular: Normal rate, regular rhythm, normal heart sounds and intact distal pulses.   No murmur heard.  Pulmonary/Chest: Effort normal and breath sounds normal. No respiratory distress. She has no wheezes.   Abdominal: Soft. Bowel sounds are normal. She exhibits no distension and no mass. There is no tenderness.   Musculoskeletal: Normal range of motion. She exhibits no edema.   Lymphadenopathy:     She has no cervical adenopathy.   Neurological: She is alert and oriented to person, place, and time. No cranial nerve deficit.   Skin: Capillary refill takes less than 2 seconds. She is not diaphoretic.   Psychiatric: She has a normal mood and affect. Her behavior is normal. Judgment and thought content normal.   Nursing note and vitals reviewed.      Laboratory Reviewed ({Yes)  Lab Results   Component Value Date    WBC 4.05 03/08/2019    HGB 11.3 (L) 03/08/2019    HCT 35.4 (L) 03/08/2019     03/08/2019    CHOL 171 02/09/2018    TRIG 40 02/09/2018    HDL 66 02/09/2018    ALT 30 03/08/2019    AST 27 03/08/2019     03/08/2019    K 4.1 03/08/2019     03/08/2019    CREATININE 0.8 03/08/2019    BUN 10 03/08/2019    CO2 26 03/08/2019    TSH 1.869 04/19/2013    INR 1.0 01/28/2019       Casie was seen today for follow-up.    Diagnoses and all orders for this visit:    Malignant neoplasm of right ovary    Malignant neoplasm of ovary, unspecified laterality    Peritoneal carcinomatosis    Essential hypertension    History of renal stent      She is doing well.   1/3 chemo done  Labs next week    Will take off work until this round is done, thinks appropriate due to the unknown of the effects of chemo.    Cards work up to look at EF.            Care Plan/Goals:  Reviewed  (No)  Goals     None          Follow up: No Follow-up on file.    After visit summary was printed and given to patient upon discharge today.  Patient goals and care plan are included in After Visit Summary.

## 2019-03-18 ENCOUNTER — HOSPITAL ENCOUNTER (OUTPATIENT)
Dept: CARDIOLOGY | Facility: HOSPITAL | Age: 64
Discharge: HOME | End: 2019-03-18
Attending: INTERNAL MEDICINE
Payer: COMMERCIAL

## 2019-03-18 ENCOUNTER — HOSPITAL ENCOUNTER (OUTPATIENT)
Dept: PULMONOLOGY | Facility: HOSPITAL | Age: 64
Discharge: HOME | End: 2019-03-18
Attending: INTERNAL MEDICINE
Payer: COMMERCIAL

## 2019-03-18 ENCOUNTER — HOSPITAL ENCOUNTER (OUTPATIENT)
Dept: RADIOLOGY | Facility: HOSPITAL | Age: 64
Discharge: HOME | End: 2019-03-18
Attending: INTERNAL MEDICINE
Payer: COMMERCIAL

## 2019-03-18 DIAGNOSIS — R94.31 ABNORMAL ECG: ICD-10-CM

## 2019-03-18 DIAGNOSIS — C56.1 MALIGNANT NEOPLASM OF RIGHT OVARY: ICD-10-CM

## 2019-03-18 DIAGNOSIS — R06.09 DOE (DYSPNEA ON EXERTION): ICD-10-CM

## 2019-03-18 DIAGNOSIS — Z01.810 PREOP CARDIOVASCULAR EXAM: ICD-10-CM

## 2019-03-18 DIAGNOSIS — Z86.79 HISTORY OF CHF (CONGESTIVE HEART FAILURE): ICD-10-CM

## 2019-03-18 DIAGNOSIS — I10 ESSENTIAL HYPERTENSION: ICD-10-CM

## 2019-03-18 LAB
DIASTOLIC DYSFUNCTION: NO
ESTIMATED PA SYSTOLIC PRESSURE: 52.28
RETIRED EF AND QEF - SEE NOTES: 55 (ref 55–65)
TRICUSPID VALVE REGURGITATION: ABNORMAL

## 2019-03-18 PROCEDURE — 93016 NM MULTI PHARM STRESS CARDIAC COMPONENT: ICD-10-PCS | Mod: ,,, | Performed by: INTERNAL MEDICINE

## 2019-03-18 PROCEDURE — A9502 TC99M TETROFOSMIN: HCPCS

## 2019-03-18 PROCEDURE — 78452 NM MULTI PHARM STRESS CARDIAC COMPONENT: ICD-10-PCS | Mod: 26,,, | Performed by: INTERNAL MEDICINE

## 2019-03-18 PROCEDURE — 93016 CV STRESS TEST SUPVJ ONLY: CPT | Mod: ,,, | Performed by: INTERNAL MEDICINE

## 2019-03-18 PROCEDURE — 63600175 PHARM REV CODE 636 W HCPCS: Performed by: PHYSICIAN ASSISTANT

## 2019-03-18 PROCEDURE — 93306 2D ECHO WITH COLOR FLOW DOPPLER: ICD-10-PCS | Mod: 26,,, | Performed by: INTERNAL MEDICINE

## 2019-03-18 PROCEDURE — 93018 NM MULTI PHARM STRESS CARDIAC COMPONENT: ICD-10-PCS | Mod: ,,, | Performed by: INTERNAL MEDICINE

## 2019-03-18 PROCEDURE — 93018 CV STRESS TEST I&R ONLY: CPT | Mod: ,,, | Performed by: INTERNAL MEDICINE

## 2019-03-18 PROCEDURE — 78452 HT MUSCLE IMAGE SPECT MULT: CPT | Mod: 26,,, | Performed by: INTERNAL MEDICINE

## 2019-03-18 PROCEDURE — 93306 TTE W/DOPPLER COMPLETE: CPT | Mod: 26,,, | Performed by: INTERNAL MEDICINE

## 2019-03-18 PROCEDURE — 93306 TTE W/DOPPLER COMPLETE: CPT

## 2019-03-18 RX ORDER — REGADENOSON 0.08 MG/ML
0.4 INJECTION, SOLUTION INTRAVENOUS ONCE
Status: COMPLETED | OUTPATIENT
Start: 2019-03-18 | End: 2019-03-18

## 2019-03-18 RX ADMIN — REGADENOSON 0.4 MG: 0.08 INJECTION, SOLUTION INTRAVENOUS at 04:03

## 2019-03-19 ENCOUNTER — OFFICE VISIT (OUTPATIENT)
Dept: HEMATOLOGY/ONCOLOGY | Facility: CLINIC | Age: 64
End: 2019-03-19
Payer: COMMERCIAL

## 2019-03-19 ENCOUNTER — DOCUMENTATION ONLY (OUTPATIENT)
Dept: HEMATOLOGY/ONCOLOGY | Facility: CLINIC | Age: 64
End: 2019-03-19

## 2019-03-19 ENCOUNTER — LAB VISIT (OUTPATIENT)
Dept: LAB | Facility: HOSPITAL | Age: 64
End: 2019-03-19
Attending: INTERNAL MEDICINE
Payer: COMMERCIAL

## 2019-03-19 ENCOUNTER — TELEPHONE (OUTPATIENT)
Dept: CARDIOLOGY | Facility: HOSPITAL | Age: 64
End: 2019-03-19

## 2019-03-19 VITALS
BODY MASS INDEX: 36.95 KG/M2 | OXYGEN SATURATION: 98 % | HEART RATE: 72 BPM | RESPIRATION RATE: 18 BRPM | HEIGHT: 67 IN | SYSTOLIC BLOOD PRESSURE: 142 MMHG | WEIGHT: 235.44 LBS | TEMPERATURE: 98 F | DIASTOLIC BLOOD PRESSURE: 90 MMHG

## 2019-03-19 DIAGNOSIS — Z86.79 HISTORY OF CHF (CONGESTIVE HEART FAILURE): ICD-10-CM

## 2019-03-19 DIAGNOSIS — C56.9 MALIGNANT NEOPLASM OF OVARY, UNSPECIFIED LATERALITY: ICD-10-CM

## 2019-03-19 DIAGNOSIS — C78.6 PERITONEAL CARCINOMATOSIS: ICD-10-CM

## 2019-03-19 DIAGNOSIS — C56.1 MALIGNANT NEOPLASM OF RIGHT OVARY: ICD-10-CM

## 2019-03-19 DIAGNOSIS — R06.09 DOE (DYSPNEA ON EXERTION): ICD-10-CM

## 2019-03-19 DIAGNOSIS — Z01.810 PREOP CARDIOVASCULAR EXAM: ICD-10-CM

## 2019-03-19 DIAGNOSIS — I27.20 PULMONARY HYPERTENSION: Primary | ICD-10-CM

## 2019-03-19 DIAGNOSIS — C56.1 MALIGNANT NEOPLASM OF RIGHT OVARY: Primary | ICD-10-CM

## 2019-03-19 DIAGNOSIS — R94.31 ABNORMAL ECG: ICD-10-CM

## 2019-03-19 DIAGNOSIS — I10 ESSENTIAL HYPERTENSION: ICD-10-CM

## 2019-03-19 LAB
ALBUMIN SERPL BCP-MCNC: 3.4 G/DL
ALP SERPL-CCNC: 131 U/L
ALT SERPL W/O P-5'-P-CCNC: 16 U/L
ANION GAP SERPL CALC-SCNC: 7 MMOL/L
AST SERPL-CCNC: 21 U/L
BASOPHILS # BLD AUTO: 0.06 K/UL
BASOPHILS NFR BLD: 0.8 %
BILIRUB SERPL-MCNC: 0.5 MG/DL
BNP SERPL-MCNC: 96 PG/ML
BUN SERPL-MCNC: 8 MG/DL
CALCIUM SERPL-MCNC: 9.8 MG/DL
CANCER AG125 SERPL-ACNC: 290 U/ML
CHLORIDE SERPL-SCNC: 108 MMOL/L
CO2 SERPL-SCNC: 28 MMOL/L
CREAT SERPL-MCNC: 0.8 MG/DL
CREAT UR-MCNC: 135 MG/DL
DIASTOLIC DYSFUNCTION: NO
DIFFERENTIAL METHOD: ABNORMAL
EOSINOPHIL # BLD AUTO: 0.1 K/UL
EOSINOPHIL NFR BLD: 0.8 %
ERYTHROCYTE [DISTWIDTH] IN BLOOD BY AUTOMATED COUNT: 16 %
EST. GFR  (AFRICAN AMERICAN): >60 ML/MIN/1.73 M^2
EST. GFR  (NON AFRICAN AMERICAN): >60 ML/MIN/1.73 M^2
GLUCOSE SERPL-MCNC: 104 MG/DL
HCT VFR BLD AUTO: 35.4 %
HGB BLD-MCNC: 10.9 G/DL
IMM GRANULOCYTES # BLD AUTO: 0.04 K/UL
IMM GRANULOCYTES NFR BLD AUTO: 0.5 %
LYMPHOCYTES # BLD AUTO: 1.9 K/UL
LYMPHOCYTES NFR BLD: 23.8 %
MCH RBC QN AUTO: 26.5 PG
MCHC RBC AUTO-ENTMCNC: 30.8 G/DL
MCV RBC AUTO: 86 FL
MONOCYTES # BLD AUTO: 0.7 K/UL
MONOCYTES NFR BLD: 8.8 %
NEUTROPHILS # BLD AUTO: 5.3 K/UL
NEUTROPHILS NFR BLD: 65.8 %
NRBC BLD-RTO: 0 /100 WBC
PLATELET # BLD AUTO: 215 K/UL
PMV BLD AUTO: 9.1 FL
POTASSIUM SERPL-SCNC: 4.2 MMOL/L
PROT SERPL-MCNC: 6.9 G/DL
PROT UR-MCNC: 9 MG/DL
PROT/CREAT UR: 0.07 MG/G{CREAT}
RBC # BLD AUTO: 4.12 M/UL
SODIUM SERPL-SCNC: 143 MMOL/L
WBC # BLD AUTO: 8 K/UL

## 2019-03-19 PROCEDURE — 99215 PR OFFICE/OUTPT VISIT, EST, LEVL V, 40-54 MIN: ICD-10-PCS | Mod: S$GLB,,, | Performed by: INTERNAL MEDICINE

## 2019-03-19 PROCEDURE — 80053 COMPREHEN METABOLIC PANEL: CPT

## 2019-03-19 PROCEDURE — 3077F PR MOST RECENT SYSTOLIC BLOOD PRESSURE >= 140 MM HG: ICD-10-PCS | Mod: CPTII,S$GLB,, | Performed by: INTERNAL MEDICINE

## 2019-03-19 PROCEDURE — 3077F SYST BP >= 140 MM HG: CPT | Mod: CPTII,S$GLB,, | Performed by: INTERNAL MEDICINE

## 2019-03-19 PROCEDURE — 36415 COLL VENOUS BLD VENIPUNCTURE: CPT

## 2019-03-19 PROCEDURE — 99999 PR PBB SHADOW E&M-EST. PATIENT-LVL III: CPT | Mod: PBBFAC,,, | Performed by: INTERNAL MEDICINE

## 2019-03-19 PROCEDURE — 86304 IMMUNOASSAY TUMOR CA 125: CPT

## 2019-03-19 PROCEDURE — 85025 COMPLETE CBC W/AUTO DIFF WBC: CPT

## 2019-03-19 PROCEDURE — 83880 ASSAY OF NATRIURETIC PEPTIDE: CPT

## 2019-03-19 PROCEDURE — 3080F PR MOST RECENT DIASTOLIC BLOOD PRESSURE >= 90 MM HG: ICD-10-PCS | Mod: CPTII,S$GLB,, | Performed by: INTERNAL MEDICINE

## 2019-03-19 PROCEDURE — 84156 ASSAY OF PROTEIN URINE: CPT

## 2019-03-19 PROCEDURE — 3008F PR BODY MASS INDEX (BMI) DOCUMENTED: ICD-10-PCS | Mod: CPTII,S$GLB,, | Performed by: INTERNAL MEDICINE

## 2019-03-19 PROCEDURE — 3080F DIAST BP >= 90 MM HG: CPT | Mod: CPTII,S$GLB,, | Performed by: INTERNAL MEDICINE

## 2019-03-19 PROCEDURE — 99999 PR PBB SHADOW E&M-EST. PATIENT-LVL III: ICD-10-PCS | Mod: PBBFAC,,, | Performed by: INTERNAL MEDICINE

## 2019-03-19 PROCEDURE — 99215 OFFICE O/P EST HI 40 MIN: CPT | Mod: S$GLB,,, | Performed by: INTERNAL MEDICINE

## 2019-03-19 PROCEDURE — 3008F BODY MASS INDEX DOCD: CPT | Mod: CPTII,S$GLB,, | Performed by: INTERNAL MEDICINE

## 2019-03-19 RX ORDER — SODIUM CHLORIDE 0.9 % (FLUSH) 0.9 %
10 SYRINGE (ML) INJECTION
Status: CANCELLED | OUTPATIENT
Start: 2019-03-19

## 2019-03-19 RX ORDER — DIPHENHYDRAMINE HYDROCHLORIDE 50 MG/ML
50 INJECTION INTRAMUSCULAR; INTRAVENOUS ONCE AS NEEDED
Status: CANCELLED | OUTPATIENT
Start: 2019-03-19 | End: 2019-03-19

## 2019-03-19 RX ORDER — FAMOTIDINE 10 MG/ML
20 INJECTION INTRAVENOUS
Status: CANCELLED | OUTPATIENT
Start: 2019-03-19

## 2019-03-19 RX ORDER — EPINEPHRINE 0.3 MG/.3ML
0.3 INJECTION SUBCUTANEOUS ONCE AS NEEDED
Status: CANCELLED | OUTPATIENT
Start: 2019-03-19 | End: 2019-03-19

## 2019-03-19 RX ORDER — HEPARIN 100 UNIT/ML
500 SYRINGE INTRAVENOUS
Status: CANCELLED | OUTPATIENT
Start: 2019-03-19

## 2019-03-19 NOTE — TELEPHONE ENCOUNTER
Please call pt  She passed her nuclear stress test.  No blockages noted  Echo and stress test shows normal heart strength and no evidence of CHF.  She does have elevated BP in lungs called pulmonary hypertension and needs a consult to Pulmonary clinic for further evaluation.  Please schedule.    Dr Estrada

## 2019-03-19 NOTE — PROGRESS NOTES
Hematology/Oncology Office Note    Cc:  Follow-up on pathology    Diagnosis:  Stage IV ovarian cancer  peritoneal carcinomatosis with malignant pleural effusion    Treatment:  02/28/2019 carboplatin/Taxol/Avastin cycle 1 day 1    Ms. Gaines is a 63-year-old female with PMH significant for HTN, hyperlipidemia, presents to [facility] ED complaining of progressively worsening shortness of breath associated with left abdominal discomfort.  She reports chills but no fever.  Complains of shortness of breath without cough, congestion.  Denies hemoptysis or recent weight loss.  In the ED chest x-ray revealed large right sided pleural effusion.  CT abdomen revealed numerous soft tissue masses within the left kimani abdomen, suggestive of peritoneal carcinomatosis.  Patient has no known history of malignancy.      Patient underwent CT-guided biopsy of omental mass which demonstrated carcinoma consistent with ovarian primary.  In addition cytology from thoracentesis was positive for malignancy.  She underwent IVP for right-sided hydronephrosis and the patient underwent right ureteral stenting by Dr. Santiago.    I have reviewed and updated the HPI, ROS, PMHx, Social Hx, Family Hx and treatment history.    Today's visit:  Patient presents today prior to cycle 2.  She continues to do well without fever, chills, nausea/vomiting/diarrhea.        Treatment:    Surveillance:      Past Medical History:   Diagnosis Date    Anemia     Anxiety     Arthritis     knees    Cancer     ovarian    CHF (congestive heart failure)     Hyperlipemia     Hypertension     Neck pain          Social History:  Former smoker quit in 10/2008  No alcohol or illicit drugs      Family History: family history includes Anesthesia problems in her other; Breast cancer in her maternal aunt and paternal aunt; Colon cancer in her brother; Ovarian cancer in her paternal aunt.        HPI    Review of Systems   Constitutional: Positive for activity  "change and fatigue. Negative for appetite change, diaphoresis, fever and unexpected weight change.   HENT: Negative for congestion, dental problem, drooling, ear discharge, facial swelling, hearing loss, nosebleeds, postnasal drip, sinus pressure, sinus pain and sore throat.    Eyes: Negative.    Respiratory: Negative for apnea, cough, choking, chest tightness, shortness of breath, wheezing and stridor.    Cardiovascular: Negative for chest pain.   Gastrointestinal: Positive for abdominal distention and abdominal pain. Negative for anal bleeding, blood in stool, constipation, diarrhea, nausea, rectal pain and vomiting.   Endocrine: Negative.    Genitourinary: Negative for difficulty urinating, dysuria, enuresis, flank pain, frequency, hematuria and menstrual problem.        Right flank pain   Musculoskeletal: Negative for arthralgias, back pain, joint swelling, myalgias, neck pain and neck stiffness.   Skin: Negative for wound.   Allergic/Immunologic: Negative.    Neurological: Negative for dizziness, tremors, seizures, syncope, facial asymmetry, speech difficulty, weakness, light-headedness and numbness.   Hematological: Negative for adenopathy. Does not bruise/bleed easily.   Psychiatric/Behavioral: Negative for agitation, behavioral problems, confusion, decreased concentration and dysphoric mood. The patient is not nervous/anxious and is not hyperactive.        Objective:         Vitals:    03/19/19 0959   BP: (!) 142/90   Pulse: 72   Resp: 18   Temp: 98.1 °F (36.7 °C)   TempSrc: Oral   SpO2: 98%   Weight: 106.8 kg (235 lb 7.2 oz)   Height: 5' 7" (1.702 m)     Physical Exam   Constitutional: She is oriented to person, place, and time. She appears well-developed and well-nourished. No distress.   Well groomed   HENT:   Head: Normocephalic and atraumatic. Not macrocephalic. Head is without laceration. Hair is normal.   Nose: Nose normal.   Mouth/Throat: Oropharynx is clear and moist. No oropharyngeal exudate. "   Eyes: Conjunctivae and EOM are normal. Pupils are equal, round, and reactive to light.   Neck: Normal range of motion. Neck supple. No JVD present. No tracheal deviation present. No thyromegaly present.   Cardiovascular: Normal rate, regular rhythm, normal heart sounds and intact distal pulses. Exam reveals no friction rub.   No murmur heard.  Pulmonary/Chest: Effort normal and breath sounds normal. No accessory muscle usage or stridor. No respiratory distress. She has no decreased breath sounds. She has no wheezes. She has no rhonchi. She has no rales. She exhibits no tenderness.   Abdominal: Soft. Bowel sounds are normal. There is no tenderness. There is no rebound and no guarding.   Musculoskeletal: Normal range of motion. She exhibits no edema.   Lymphadenopathy:     She has no cervical adenopathy.   Neurological: She is alert and oriented to person, place, and time. No cranial nerve deficit. She exhibits normal muscle tone.   Skin: Skin is warm, dry and intact. Capillary refill takes less than 2 seconds. No abrasion, no bruising and no rash noted. She is not diaphoretic. No pallor.   Psychiatric: She has a normal mood and affect. Her behavior is normal. Judgment and thought content normal.       Lab Results   Component Value Date    WBC 8.00 03/19/2019    HGB 10.9 (L) 03/19/2019    HCT 35.4 (L) 03/19/2019    MCV 86 03/19/2019     03/19/2019     Lab Results   Component Value Date    CREATININE 0.8 03/19/2019    BUN 8 03/19/2019     03/19/2019    K 4.2 03/19/2019     03/19/2019    CO2 28 03/19/2019     Lab Results   Component Value Date    ALT 16 03/19/2019    AST 21 03/19/2019    ALKPHOS 131 03/19/2019    BILITOT 0.5 03/19/2019       Assessment:       63-year-old female status post hospitalization for new diagnosis of peritoneal carcinomatosis with malignant right pleural effusion.  Final pathology is consistent with ovarian primary with CA 2740.  PET scan demonstrates multiple omental and  peritoneal avid masses consistent with peritoneal carcinomatosis, extensive retroperitoneal and mediastinal lymphadenopathy, masslike structure in the right iliac fossa likely representing ovarian mass.  Patient has been evaluated by GYN Oncology Dr. Juarez and MD Green with recommendations for him carboplatin/Taxol/Avastin.  She will follow up with Dr. Juarez after cycle 3.    She is status post cycle 1 of carboplatin/Taxol/Avastin on 02/28/2019 with 1 episode of nausea/vomiting/diarrhea which occurred 4-5 days following treatment and sounds like viral gastroenteritis.  It resolved within 24-48 hours without residual GI side effects.   Genetic screening via [employer] demonstrated no clinically significant mutations, however, the patient did have 3 variants of on uncertain significance  She presents today prior to cycle 2 with adequate counts/labs.  Proceed with cycle 2 on 03/21/2019.  The patient will follow up in 3 weeks for cycle 3 and plan to revisit with  asked is after cycle 3 with repeat imaging.        Stage IV ovarian cancer/peritoneal carcinomatosis with malignant right pleural effusion:  Negative genetic screening  --carboplatin/Taxol/Avastin Q 3 weeks  --cycle 1 day 1 02/28/2019  --proceed with cycle 2 on 03/21/2029  --BRCA mutation study via [employer] pending  --f/u with Dr Juarez following cycle 3 with repeat imaging

## 2019-03-20 NOTE — TELEPHONE ENCOUNTER
Spoke with patient to advise her that she passed her nuclear stress test.  No blockages noted  Echo and stress test shows normal heart strength and no evidence of CHF.  She does have elevated BP in lungs called pulmonary hypertension and needs a consult to Pulmonary clinic for further evaluation.  Pulmonary consult made with Dr. Bee on 5/7/19.  Patient denies any questions/concerns.  Instructed to call office should any questions/concerns arise.  Patient verbalized understanding.

## 2019-03-21 ENCOUNTER — DOCUMENTATION ONLY (OUTPATIENT)
Dept: HEMATOLOGY/ONCOLOGY | Facility: CLINIC | Age: 64
End: 2019-03-21

## 2019-03-21 ENCOUNTER — INFUSION (OUTPATIENT)
Dept: INFUSION THERAPY | Facility: HOSPITAL | Age: 64
End: 2019-03-21
Attending: INTERNAL MEDICINE
Payer: COMMERCIAL

## 2019-03-21 VITALS
WEIGHT: 235.44 LBS | DIASTOLIC BLOOD PRESSURE: 80 MMHG | SYSTOLIC BLOOD PRESSURE: 147 MMHG | BODY MASS INDEX: 36.95 KG/M2 | RESPIRATION RATE: 18 BRPM | HEIGHT: 67 IN | HEART RATE: 91 BPM | OXYGEN SATURATION: 98 % | TEMPERATURE: 97 F

## 2019-03-21 DIAGNOSIS — C56.1 MALIGNANT NEOPLASM OF RIGHT OVARY: Primary | ICD-10-CM

## 2019-03-21 DIAGNOSIS — C78.6 PERITONEAL CARCINOMATOSIS: ICD-10-CM

## 2019-03-21 PROCEDURE — 25000003 PHARM REV CODE 250

## 2019-03-21 PROCEDURE — 25000003 PHARM REV CODE 250: Performed by: INTERNAL MEDICINE

## 2019-03-21 PROCEDURE — 96375 TX/PRO/DX INJ NEW DRUG ADDON: CPT

## 2019-03-21 PROCEDURE — 96367 TX/PROPH/DG ADDL SEQ IV INF: CPT

## 2019-03-21 PROCEDURE — 96413 CHEMO IV INFUSION 1 HR: CPT

## 2019-03-21 PROCEDURE — 96417 CHEMO IV INFUS EACH ADDL SEQ: CPT

## 2019-03-21 PROCEDURE — S0028 INJECTION, FAMOTIDINE, 20 MG: HCPCS

## 2019-03-21 PROCEDURE — 96415 CHEMO IV INFUSION ADDL HR: CPT

## 2019-03-21 PROCEDURE — 63600175 PHARM REV CODE 636 W HCPCS

## 2019-03-21 PROCEDURE — 63600175 PHARM REV CODE 636 W HCPCS: Performed by: INTERNAL MEDICINE

## 2019-03-21 RX ORDER — FAMOTIDINE 10 MG/ML
20 INJECTION INTRAVENOUS
Status: COMPLETED | OUTPATIENT
Start: 2019-03-21 | End: 2019-03-21

## 2019-03-21 RX ORDER — SODIUM CHLORIDE 0.9 % (FLUSH) 0.9 %
10 SYRINGE (ML) INJECTION
Status: DISCONTINUED | OUTPATIENT
Start: 2019-03-21 | End: 2019-03-21 | Stop reason: HOSPADM

## 2019-03-21 RX ORDER — HEPARIN 100 UNIT/ML
500 SYRINGE INTRAVENOUS
Status: DISCONTINUED | OUTPATIENT
Start: 2019-03-21 | End: 2019-03-21 | Stop reason: HOSPADM

## 2019-03-21 RX ORDER — EPINEPHRINE 1 MG/ML
0.3 INJECTION, SOLUTION INTRACARDIAC; INTRAMUSCULAR; INTRAVENOUS; SUBCUTANEOUS ONCE AS NEEDED
Status: DISCONTINUED | OUTPATIENT
Start: 2019-03-21 | End: 2019-03-21 | Stop reason: HOSPADM

## 2019-03-21 RX ORDER — DIPHENHYDRAMINE HYDROCHLORIDE 50 MG/ML
50 INJECTION INTRAMUSCULAR; INTRAVENOUS ONCE AS NEEDED
Status: DISCONTINUED | OUTPATIENT
Start: 2019-03-21 | End: 2019-03-21 | Stop reason: HOSPADM

## 2019-03-21 RX ORDER — FAMOTIDINE 10 MG/ML
INJECTION INTRAVENOUS
Status: COMPLETED
Start: 2019-03-21 | End: 2019-03-21

## 2019-03-21 RX ADMIN — FAMOTIDINE 20 MG: 10 INJECTION INTRAVENOUS at 10:03

## 2019-03-21 RX ADMIN — DEXAMETHASONE SODIUM PHOSPHATE: 4 INJECTION, SOLUTION INTRA-ARTICULAR; INTRALESIONAL; INTRAMUSCULAR; INTRAVENOUS; SOFT TISSUE at 10:03

## 2019-03-21 RX ADMIN — PACLITAXEL 396 MG: 6 INJECTION, SOLUTION INTRAVENOUS at 12:03

## 2019-03-21 RX ADMIN — BEVACIZUMAB 1600 MG: 400 INJECTION, SOLUTION INTRAVENOUS at 11:03

## 2019-03-21 RX ADMIN — DIPHENHYDRAMINE HYDROCHLORIDE 50 MG: 50 INJECTION, SOLUTION INTRAMUSCULAR; INTRAVENOUS at 10:03

## 2019-03-21 RX ADMIN — HEPARIN SODIUM (PORCINE) LOCK FLUSH IV SOLN 100 UNIT/ML 500 UNITS: 100 SOLUTION at 04:03

## 2019-03-21 RX ADMIN — CARBOPLATIN 870 MG: 10 INJECTION, SOLUTION INTRAVENOUS at 04:03

## 2019-03-21 RX ADMIN — FAMOTIDINE 20 MG: 10 INJECTION, SOLUTION INTRAVENOUS at 10:03

## 2019-03-21 NOTE — PROGRESS NOTES
SW met with pt briefly in infusion to provide her with letter at her request. Pt also asked SW to fax her referral to [facility] as when she went to register there, they had not received the referral yet. SW faxed the referral and received confirmation that it went through. SW informed pt of this. No further needs were reported by pt. SW will f/u as requested.

## 2019-03-21 NOTE — PLAN OF CARE
Problem: Adult Inpatient Plan of Care  Goal: Plan of Care Review  Outcome: Ongoing (interventions implemented as appropriate)  States she is doing well.

## 2019-04-08 ENCOUNTER — TELEPHONE (OUTPATIENT)
Dept: HEMATOLOGY/ONCOLOGY | Facility: CLINIC | Age: 64
End: 2019-04-08

## 2019-04-09 ENCOUNTER — LAB VISIT (OUTPATIENT)
Dept: LAB | Facility: HOSPITAL | Age: 64
End: 2019-04-09
Attending: INTERNAL MEDICINE
Payer: COMMERCIAL

## 2019-04-09 ENCOUNTER — OFFICE VISIT (OUTPATIENT)
Dept: HEMATOLOGY/ONCOLOGY | Facility: CLINIC | Age: 64
End: 2019-04-09
Payer: COMMERCIAL

## 2019-04-09 ENCOUNTER — TELEPHONE (OUTPATIENT)
Dept: HEMATOLOGY/ONCOLOGY | Facility: CLINIC | Age: 64
End: 2019-04-09

## 2019-04-09 VITALS
HEART RATE: 83 BPM | SYSTOLIC BLOOD PRESSURE: 151 MMHG | OXYGEN SATURATION: 97 % | WEIGHT: 232.81 LBS | HEIGHT: 67 IN | DIASTOLIC BLOOD PRESSURE: 83 MMHG | BODY MASS INDEX: 36.54 KG/M2 | TEMPERATURE: 96 F

## 2019-04-09 DIAGNOSIS — C56.1 MALIGNANT NEOPLASM OF RIGHT OVARY: ICD-10-CM

## 2019-04-09 DIAGNOSIS — C56.1 MALIGNANT NEOPLASM OF RIGHT OVARY: Primary | ICD-10-CM

## 2019-04-09 LAB
ALBUMIN SERPL BCP-MCNC: 3.5 G/DL (ref 3.5–5.2)
ALP SERPL-CCNC: 118 U/L (ref 55–135)
ALT SERPL W/O P-5'-P-CCNC: 22 U/L (ref 10–44)
ANION GAP SERPL CALC-SCNC: 11 MMOL/L (ref 8–16)
AST SERPL-CCNC: 23 U/L (ref 10–40)
BASOPHILS # BLD AUTO: 0.02 K/UL (ref 0–0.2)
BASOPHILS NFR BLD: 0.4 % (ref 0–1.9)
BILIRUB SERPL-MCNC: 0.5 MG/DL (ref 0.1–1)
BUN SERPL-MCNC: 9 MG/DL (ref 8–23)
CALCIUM SERPL-MCNC: 9.7 MG/DL (ref 8.7–10.5)
CANCER AG125 SERPL-ACNC: 32 U/ML (ref 0–30)
CHLORIDE SERPL-SCNC: 108 MMOL/L (ref 95–110)
CO2 SERPL-SCNC: 24 MMOL/L (ref 23–29)
CREAT SERPL-MCNC: 0.7 MG/DL (ref 0.5–1.4)
DIFFERENTIAL METHOD: ABNORMAL
EOSINOPHIL # BLD AUTO: 0 K/UL (ref 0–0.5)
EOSINOPHIL NFR BLD: 0.5 % (ref 0–8)
ERYTHROCYTE [DISTWIDTH] IN BLOOD BY AUTOMATED COUNT: 17.7 % (ref 11.5–14.5)
EST. GFR  (AFRICAN AMERICAN): >60 ML/MIN/1.73 M^2
EST. GFR  (NON AFRICAN AMERICAN): >60 ML/MIN/1.73 M^2
GLUCOSE SERPL-MCNC: 99 MG/DL (ref 70–110)
HCT VFR BLD AUTO: 33.1 % (ref 37–48.5)
HGB BLD-MCNC: 10.3 G/DL (ref 12–16)
IMM GRANULOCYTES # BLD AUTO: 0.02 K/UL (ref 0–0.04)
IMM GRANULOCYTES NFR BLD AUTO: 0.4 % (ref 0–0.5)
LYMPHOCYTES # BLD AUTO: 1.7 K/UL (ref 1–4.8)
LYMPHOCYTES NFR BLD: 31.7 % (ref 18–48)
MCH RBC QN AUTO: 26.9 PG (ref 27–31)
MCHC RBC AUTO-ENTMCNC: 31.1 G/DL (ref 32–36)
MCV RBC AUTO: 86 FL (ref 82–98)
MONOCYTES # BLD AUTO: 0.5 K/UL (ref 0.3–1)
MONOCYTES NFR BLD: 8.9 % (ref 4–15)
NEUTROPHILS # BLD AUTO: 3.2 K/UL (ref 1.8–7.7)
NEUTROPHILS NFR BLD: 58.5 % (ref 38–73)
NRBC BLD-RTO: 0 /100 WBC
PLATELET # BLD AUTO: 164 K/UL (ref 150–350)
PMV BLD AUTO: 9.3 FL (ref 9.2–12.9)
POTASSIUM SERPL-SCNC: 3.8 MMOL/L (ref 3.5–5.1)
PROT SERPL-MCNC: 6.7 G/DL (ref 6–8.4)
RBC # BLD AUTO: 3.83 M/UL (ref 4–5.4)
SODIUM SERPL-SCNC: 143 MMOL/L (ref 136–145)
WBC # BLD AUTO: 5.49 K/UL (ref 3.9–12.7)

## 2019-04-09 PROCEDURE — 80053 COMPREHEN METABOLIC PANEL: CPT

## 2019-04-09 PROCEDURE — 86304 IMMUNOASSAY TUMOR CA 125: CPT

## 2019-04-09 PROCEDURE — 99215 OFFICE O/P EST HI 40 MIN: CPT | Mod: S$GLB,,, | Performed by: INTERNAL MEDICINE

## 2019-04-09 PROCEDURE — 99999 PR PBB SHADOW E&M-EST. PATIENT-LVL III: CPT | Mod: PBBFAC,,, | Performed by: INTERNAL MEDICINE

## 2019-04-09 PROCEDURE — 3008F PR BODY MASS INDEX (BMI) DOCUMENTED: ICD-10-PCS | Mod: CPTII,S$GLB,, | Performed by: INTERNAL MEDICINE

## 2019-04-09 PROCEDURE — 85025 COMPLETE CBC W/AUTO DIFF WBC: CPT

## 2019-04-09 PROCEDURE — 36415 COLL VENOUS BLD VENIPUNCTURE: CPT

## 2019-04-09 PROCEDURE — 99999 PR PBB SHADOW E&M-EST. PATIENT-LVL III: ICD-10-PCS | Mod: PBBFAC,,, | Performed by: INTERNAL MEDICINE

## 2019-04-09 PROCEDURE — 3077F SYST BP >= 140 MM HG: CPT | Mod: CPTII,S$GLB,, | Performed by: INTERNAL MEDICINE

## 2019-04-09 PROCEDURE — 3079F DIAST BP 80-89 MM HG: CPT | Mod: CPTII,S$GLB,, | Performed by: INTERNAL MEDICINE

## 2019-04-09 PROCEDURE — 3079F PR MOST RECENT DIASTOLIC BLOOD PRESSURE 80-89 MM HG: ICD-10-PCS | Mod: CPTII,S$GLB,, | Performed by: INTERNAL MEDICINE

## 2019-04-09 PROCEDURE — 99215 PR OFFICE/OUTPT VISIT, EST, LEVL V, 40-54 MIN: ICD-10-PCS | Mod: S$GLB,,, | Performed by: INTERNAL MEDICINE

## 2019-04-09 PROCEDURE — 3077F PR MOST RECENT SYSTOLIC BLOOD PRESSURE >= 140 MM HG: ICD-10-PCS | Mod: CPTII,S$GLB,, | Performed by: INTERNAL MEDICINE

## 2019-04-09 PROCEDURE — 3008F BODY MASS INDEX DOCD: CPT | Mod: CPTII,S$GLB,, | Performed by: INTERNAL MEDICINE

## 2019-04-09 RX ORDER — EPINEPHRINE 0.3 MG/.3ML
0.3 INJECTION SUBCUTANEOUS ONCE AS NEEDED
Status: CANCELLED | OUTPATIENT
Start: 2019-04-09

## 2019-04-09 RX ORDER — HEPARIN 100 UNIT/ML
500 SYRINGE INTRAVENOUS
Status: CANCELLED | OUTPATIENT
Start: 2019-04-09

## 2019-04-09 RX ORDER — FAMOTIDINE 10 MG/ML
20 INJECTION INTRAVENOUS
Status: CANCELLED | OUTPATIENT
Start: 2019-04-09

## 2019-04-09 RX ORDER — SODIUM CHLORIDE 0.9 % (FLUSH) 0.9 %
10 SYRINGE (ML) INJECTION
Status: CANCELLED | OUTPATIENT
Start: 2019-04-09

## 2019-04-09 RX ORDER — DIPHENHYDRAMINE HYDROCHLORIDE 50 MG/ML
50 INJECTION INTRAMUSCULAR; INTRAVENOUS ONCE AS NEEDED
Status: CANCELLED | OUTPATIENT
Start: 2019-04-09

## 2019-04-09 NOTE — TELEPHONE ENCOUNTER
Pt will  Life Insurance statement that we filled out today and fill out her part with her agent and pt will fax to her insurance company. Dr Trevizo will discuss life expenctancy over 6 months at next visit.    I explained we needed copies of the entire policy back to scan into chart.

## 2019-04-09 NOTE — PROGRESS NOTES
Hematology/Oncology Office Note    Cc:  Follow-up on pathology    Diagnosis:  Stage IV ovarian cancer  peritoneal carcinomatosis with malignant pleural effusion    Treatment:  02/28/2019 carboplatin/Taxol/Avastin cycle 1 day 1    Ms. Gaines is a 63-year-old female with PMH significant for HTN, hyperlipidemia, presents to [facility] ED complaining of progressively worsening shortness of breath associated with left abdominal discomfort.  She reports chills but no fever.  Complains of shortness of breath without cough, congestion.  Denies hemoptysis or recent weight loss.  In the ED chest x-ray revealed large right sided pleural effusion.  CT abdomen revealed numerous soft tissue masses within the left kimani abdomen, suggestive of peritoneal carcinomatosis.  Patient has no known history of malignancy.      Patient underwent CT-guided biopsy of omental mass which demonstrated carcinoma consistent with ovarian primary.  In addition cytology from thoracentesis was positive for malignancy.  She underwent IVP for right-sided hydronephrosis and the patient underwent right ureteral stenting by Dr. Santiago.    I have reviewed and updated the HPI, ROS, PMHx, Social Hx, Family Hx and treatment history.    Today's visit:  Patient presents today prior to cycle 2.  She continues to do well without fever, chills, nausea/vomiting/diarrhea.        Treatment:    Surveillance:      Past Medical History:   Diagnosis Date    Anemia     Anxiety     Arthritis     knees    Cancer     ovarian    CHF (congestive heart failure)     Hyperlipemia     Hypertension     Neck pain          Social History:  Former smoker quit in 10/2008  No alcohol or illicit drugs      Family History: family history includes Anesthesia problems in her other; Breast cancer in her maternal aunt and paternal aunt; Colon cancer in her brother; Ovarian cancer in her paternal aunt.        HPI    Review of Systems   Constitutional: Positive for activity  "change and fatigue. Negative for appetite change, diaphoresis, fever and unexpected weight change.   HENT: Negative for congestion, dental problem, drooling, ear discharge, facial swelling, hearing loss, nosebleeds, postnasal drip, sinus pressure, sinus pain and sore throat.    Eyes: Negative.    Respiratory: Negative for apnea, cough, choking, chest tightness, shortness of breath, wheezing and stridor.    Cardiovascular: Negative for chest pain.   Gastrointestinal: Positive for abdominal distention and abdominal pain. Negative for anal bleeding, blood in stool, constipation, diarrhea, nausea, rectal pain and vomiting.   Endocrine: Negative.    Genitourinary: Negative for difficulty urinating, dysuria, enuresis, flank pain, frequency, hematuria and menstrual problem.        Right flank pain   Musculoskeletal: Negative for arthralgias, back pain, joint swelling, myalgias, neck pain and neck stiffness.   Skin: Negative for wound.   Allergic/Immunologic: Negative.    Neurological: Negative for dizziness, tremors, seizures, syncope, facial asymmetry, speech difficulty, weakness, light-headedness and numbness.   Hematological: Negative for adenopathy. Does not bruise/bleed easily.   Psychiatric/Behavioral: Negative for agitation, behavioral problems, confusion, decreased concentration and dysphoric mood. The patient is not nervous/anxious and is not hyperactive.        Objective:         Vitals:    04/09/19 0945   BP: (!) 151/83   Pulse: 83   Temp: 96.2 °F (35.7 °C)   SpO2: 97%   Weight: 105.6 kg (232 lb 12.9 oz)   Height: 5' 7" (1.702 m)     Physical Exam   Constitutional: She is oriented to person, place, and time. She appears well-developed and well-nourished. No distress.   Well groomed   HENT:   Head: Normocephalic and atraumatic. Not macrocephalic. Head is without laceration. Hair is normal.   Nose: Nose normal.   Mouth/Throat: Oropharynx is clear and moist. No oropharyngeal exudate.   Eyes: Pupils are equal, " round, and reactive to light. Conjunctivae and EOM are normal.   Neck: Normal range of motion. Neck supple. No JVD present. No tracheal deviation present. No thyromegaly present.   Cardiovascular: Normal rate, regular rhythm, normal heart sounds and intact distal pulses. Exam reveals no friction rub.   No murmur heard.  Pulmonary/Chest: Effort normal and breath sounds normal. No accessory muscle usage or stridor. No respiratory distress. She has no decreased breath sounds. She has no wheezes. She has no rhonchi. She has no rales. She exhibits no tenderness.   Abdominal: Soft. Bowel sounds are normal. There is no tenderness. There is no rebound and no guarding.   Musculoskeletal: Normal range of motion. She exhibits no edema.   Lymphadenopathy:     She has no cervical adenopathy.   Neurological: She is alert and oriented to person, place, and time. No cranial nerve deficit. She exhibits normal muscle tone.   Skin: Skin is warm, dry and intact. Capillary refill takes less than 2 seconds. No abrasion, no bruising and no rash noted. She is not diaphoretic. No pallor.   Psychiatric: She has a normal mood and affect. Her behavior is normal. Judgment and thought content normal.       Lab Results   Component Value Date    WBC 5.49 04/09/2019    HGB 10.3 (L) 04/09/2019    HCT 33.1 (L) 04/09/2019    MCV 86 04/09/2019     04/09/2019     Lab Results   Component Value Date    CREATININE 0.7 04/09/2019    BUN 9 04/09/2019     04/09/2019    K 3.8 04/09/2019     04/09/2019    CO2 24 04/09/2019     Lab Results   Component Value Date    ALT 22 04/09/2019    AST 23 04/09/2019    ALKPHOS 118 04/09/2019    BILITOT 0.5 04/09/2019       Assessment:       63-year-old female status post hospitalization for new diagnosis of peritoneal carcinomatosis with malignant right pleural effusion.  Final pathology is consistent with ovarian primary with CA 2740.  PET scan demonstrates multiple omental and peritoneal avid masses  consistent with peritoneal carcinomatosis, extensive retroperitoneal and mediastinal lymphadenopathy, masslike structure in the right iliac fossa likely representing ovarian mass.  Patient has been evaluated by GYN Oncology Dr. Juarez and MD Green with recommendations for him carboplatin/Taxol/Avastin.  She will follow up with Dr. Juarez after cycle 3.    She is status post cycle 1 of carboplatin/Taxol/Avastin on 02/28/2019 with 1 episode of nausea/vomiting/diarrhea which occurred 4-5 days following treatment and sounds like viral gastroenteritis.  It resolved within 24-48 hours without residual GI side effects.   Genetic screening via [employer] demonstrated no clinically significant mutations, however, the patient did have 3 variants of on uncertain significance  She presents today prior to cycle 3 with adequate labs.  She will proceed with cycle 3 on 04/11/2019.  She will follow with Dr. Juarez after cycle 3 to discuss results and additional treatment/surgery.        Stage IV ovarian cancer/peritoneal carcinomatosis with malignant right pleural effusion:  Negative genetic screening  --carboplatin/Taxol/Avastin Q 3 weeks  --cycle 1 day 1 02/28/2019  --proceed with cycle 3 on 04/11/2019  --f/u with Dr Juarez following cycle 3 with repeat CT of chest abdomen pelvis

## 2019-04-11 ENCOUNTER — INFUSION (OUTPATIENT)
Dept: INFUSION THERAPY | Facility: HOSPITAL | Age: 64
End: 2019-04-11
Attending: INTERNAL MEDICINE
Payer: COMMERCIAL

## 2019-04-11 VITALS
TEMPERATURE: 97 F | SYSTOLIC BLOOD PRESSURE: 149 MMHG | WEIGHT: 232 LBS | OXYGEN SATURATION: 96 % | BODY MASS INDEX: 36.41 KG/M2 | DIASTOLIC BLOOD PRESSURE: 78 MMHG | HEART RATE: 83 BPM | RESPIRATION RATE: 18 BRPM | HEIGHT: 67 IN

## 2019-04-11 DIAGNOSIS — C56.1 MALIGNANT NEOPLASM OF RIGHT OVARY: Primary | ICD-10-CM

## 2019-04-11 DIAGNOSIS — C78.6 PERITONEAL CARCINOMATOSIS: ICD-10-CM

## 2019-04-11 PROCEDURE — 96415 CHEMO IV INFUSION ADDL HR: CPT

## 2019-04-11 PROCEDURE — 25000003 PHARM REV CODE 250: Performed by: INTERNAL MEDICINE

## 2019-04-11 PROCEDURE — 63600175 PHARM REV CODE 636 W HCPCS: Performed by: INTERNAL MEDICINE

## 2019-04-11 PROCEDURE — 96367 TX/PROPH/DG ADDL SEQ IV INF: CPT

## 2019-04-11 PROCEDURE — S0028 INJECTION, FAMOTIDINE, 20 MG: HCPCS | Performed by: INTERNAL MEDICINE

## 2019-04-11 PROCEDURE — 96417 CHEMO IV INFUS EACH ADDL SEQ: CPT

## 2019-04-11 PROCEDURE — 96413 CHEMO IV INFUSION 1 HR: CPT

## 2019-04-11 PROCEDURE — 96375 TX/PRO/DX INJ NEW DRUG ADDON: CPT

## 2019-04-11 RX ORDER — CLONIDINE HYDROCHLORIDE 0.1 MG/1
0.2 TABLET ORAL ONCE
Status: COMPLETED | OUTPATIENT
Start: 2019-04-11 | End: 2019-04-11

## 2019-04-11 RX ORDER — HEPARIN 100 UNIT/ML
500 SYRINGE INTRAVENOUS
Status: DISCONTINUED | OUTPATIENT
Start: 2019-04-11 | End: 2019-04-11 | Stop reason: HOSPADM

## 2019-04-11 RX ORDER — FAMOTIDINE 10 MG/ML
20 INJECTION INTRAVENOUS
Status: COMPLETED | OUTPATIENT
Start: 2019-04-11 | End: 2019-04-11

## 2019-04-11 RX ORDER — CLONIDINE HYDROCHLORIDE 0.1 MG/1
0.2 TABLET ORAL ONCE
Status: CANCELLED
Start: 2019-04-11

## 2019-04-11 RX ADMIN — CLONIDINE HYDROCHLORIDE 0.2 MG: 0.1 TABLET ORAL at 11:04

## 2019-04-11 RX ADMIN — PACLITAXEL 396 MG: 6 INJECTION, SOLUTION INTRAVENOUS at 11:04

## 2019-04-11 RX ADMIN — DEXAMETHASONE SODIUM PHOSPHATE: 4 INJECTION, SOLUTION INTRA-ARTICULAR; INTRALESIONAL; INTRAMUSCULAR; INTRAVENOUS; SOFT TISSUE at 10:04

## 2019-04-11 RX ADMIN — CARBOPLATIN 900 MG: 10 INJECTION, SOLUTION INTRAVENOUS at 02:04

## 2019-04-11 RX ADMIN — BEVACIZUMAB 1600 MG: 400 INJECTION, SOLUTION INTRAVENOUS at 03:04

## 2019-04-11 RX ADMIN — HEPARIN SODIUM (PORCINE) LOCK FLUSH IV SOLN 100 UNIT/ML 500 UNITS: 100 SOLUTION at 04:04

## 2019-04-11 RX ADMIN — FAMOTIDINE 20 MG: 10 INJECTION, SOLUTION INTRAVENOUS at 10:04

## 2019-04-11 RX ADMIN — DIPHENHYDRAMINE HYDROCHLORIDE 50 MG: 50 INJECTION, SOLUTION INTRAMUSCULAR; INTRAVENOUS at 10:04

## 2019-04-11 NOTE — PLAN OF CARE
Problem: Adult Inpatient Plan of Care  Goal: Patient-Specific Goal (Individualization)  Outcome: Ongoing (interventions implemented as appropriate)  Pt likes pillow and brings her own blanket

## 2019-04-11 NOTE — PLAN OF CARE
"Problem: Adult Inpatient Plan of Care  Goal: Plan of Care Review  Outcome: Ongoing (interventions implemented as appropriate)  Pt states, " I feel tired but happy this is my 3rd and hopefully last one"      "

## 2019-04-11 NOTE — PLAN OF CARE
Problem: Neutropenia (Chemotherapy Effects)  Goal: Absence of Infection    Intervention: Prevent Infection and Maximize Resistance  Reviewed importance of infection prevention

## 2019-04-11 NOTE — DISCHARGE INSTRUCTIONS
.  [facility]  [address] [facility]  [address]  [phone] phone     [phone] fax  Hours of Operation: Monday- Friday 8:00am- 5:00pm  After hours phone  [phone]  Hematology / Oncology Physicians on call      Dr. Varinder Mckeon    Please call with any concerns regarding your appointment today.  .FALL PREVENTION   Falls often occur due to slipping, tripping or losing your balance. Here are ways to reduce your risk of falling again.   Was there anything that caused your fall that can be fixed, removed or replaced?   Make your home safe by keeping walkways clear of objects you may trip over.   Use non-slip pads under rugs.   Do not walk in poorly lit areas.   Do not stand on chairs or wobbly ladders.   Use caution when reaching overhead or looking upward. This position can cause a loss of balance.   Be sure your shoes fit properly, have non-slip bottoms and are in good condition.   Be cautious when going up and down stairs, curbs, and when walking on uneven sidewalks.   If your balance is poor, consider using a cane or walker.   If your fall was related to alcohol use, stop or limit alcohol intake.   If your fall was related to use of sleeping medicines, talk to your doctor about this. You may need to reduce your dosage at bedtime if you awaken during the night to go to the bathroom.   To reduce the need for nighttime bathroom trips:   Avoid drinking fluids for several hours before going to bed   Empty your bladder before going to bed   Men can keep a urinal at the bedside   © 2735-5351 [employer], [address] [place] [address]. All rights reserved. This information is not intended as a substitute for professional medical care. Always follow your healthcare professional's instructions.    .WAYS TO HELP PREVENT INFECTION         WASH YOUR HANDS OFTEN DURING THE DAY, ESPECIALLY BEFORE YOU EAT, AFTER USING  THE BATHROOM, AND AFTER TOUCHING ANIMALS     STAY AWAY FROM PEOPLE WHO HAVE ILLNESSES YOU CAN CATCH; SUCH AS COLDS, FLU, CHICKEN POX     TRY TO AVOID CROWDS     STAY AWAY FROM CHILDREN WHO RECENTLY HAVE RECEIVED LIVE VIRUS VACCINES     MAINTAIN GOOD MOUTH CARE     DO NOT SQUEEZE OR SCRATCH PIMPLES     CLEAN CUTS & SCRAPES RIGHT AWAY AND DAILY UNTIL HEALED WITH WARM WATER, SOAP & AN ANTISEPTIC     AVOID CONTACT WITH LITTER BOXES, BIRD CAGES, & FISH TANKS     AVOID STANDING WATER, IE., BIRD BATHS, FLOWER POTS/VASES, OR HUMIDIFIERS     WEAR GLOVES WHEN GARDENING OR CLEANING UP AFTER OTHERS, ESPECIALLY BABIES & SMALL CHILDREN     DO NOT EAT RAW FISH, SEAFOOD, MEAT, OR EGGS  .HOME CARE AFTER CHEMOTHERAPY   Meals   Many patients feel sick and lose their appetites during treatment. Eat small meals several times a day. Choose bland foods with little taste or smell if you have problems with nausea. Be sure to cook all food thoroughly. This kills bacteria and helps you avoid intestinal infection. Soft foods are easier to swallow and digest.   Activity   Exercise keeps you strong and keeps your heart and lungs active. Talk to your doctor about an appropriate exercise program for you.   Skin Care   To prevent a skin infection, bathe or shower once a day. Use a moisturizing soap and wash with warm water. Avoid very hot or cold water. Chemotherapy can make your skin dry . Apply moisturizing lotion to help relieve dry skin. Some drugs used in high doses can cause slight burns to appear (like sunburn). Ask for a special cream to help relieve the burn and protect your skin.   Prevent Mouth Sores   During chemotherapy, many people get mouth sores. Do the following to help prevent mouth sores or to ease discomfort.   Brush your teeth with a soft-bristle toothbrush after every meal.  Don't use dental floss if your platelet count is below 50,000. Your doctor or nurse will tell you if this is the case.  Use an oral swab or  "special soft toothbrush if your gums bleed during regular brushing.  Use mouthwash as directed. If you can't tolerate commercial mouthwash, use salt and baking soda to clean your mouth. Mix 1 teaspoon of salt and 1 teaspoon of baking soda into a glass of water. Swish and spit.  Call your doctor or return to this facility if you develop any of the following:   Sore throat   White patches in the mouth or throat   Fever of 100.4ºF (38ºC) or higher, or as directed by your healthcare provider  © 6408-1983 [employer] [facility], [address] [place] [address], [place] [address]. All rights reserved. This information is not intended as a substitute for professional medical care. Always follow your healthcare professional's     .Support Groups/Classes    Support groups and classes are being offered at the   [facility] and [facility]!!    "Cooking with Cancer" (Nutrition Class):  Second Wednesday of each month   at noon at the [facility].  Metastatic Support Group:  Third Tuesday of each month   at noon at the [facility].  Next Steps Class/Group: Second and fourth Thursday of each month at noon at the [facility].  [facility] (Breast Cancer Support Group): First Tuesday of each month   at 5:30pm at the [facility] location.  [facility]: [facility]: Second and third Tuesday of each month from 7:30am - 2pm.  [facility]: First and fourth Tuesday of each month from 7:30am - 2pm    If you are interested in attending or would like more information please ask our social workers or your nurse!    "

## 2019-04-11 NOTE — PLAN OF CARE
Problem: Nausea and Vomiting (Chemotherapy Effects)  Goal: Fluid and Electrolyte Balance    Intervention: Prevent and Manage Nausea and Vomiting  Reviewed use of anti-metics with pt

## 2019-04-15 ENCOUNTER — TELEPHONE (OUTPATIENT)
Dept: RADIOLOGY | Facility: HOSPITAL | Age: 64
End: 2019-04-15

## 2019-04-15 ENCOUNTER — TELEPHONE (OUTPATIENT)
Dept: HEMATOLOGY/ONCOLOGY | Facility: CLINIC | Age: 64
End: 2019-04-15

## 2019-04-15 NOTE — TELEPHONE ENCOUNTER
----- Message from Chrystal Montiel LPN sent at 4/12/2019  1:25 PM CDT -----  Contact: pt   Please advise below.    Thanks,  Chrystal  ----- Message -----  From: Olinda Garcias  Sent: 4/12/2019  11:31 AM  To: [facility]is Staff    Type:  Needs Medical Advice    Who Called: REAL HERNANDEZ   Symptoms (please be specific):   How long has patient had these symptoms:    Pharmacy name and phone #:    Would the patient rather a call back or a response via [facility]? Call   Best Call Back Number: [phone] (home)   Additional Information: pt is requesting a call back from the nurse in regards to the pt disability clam closing soon because they never got the paperwork faxed back to them

## 2019-04-16 ENCOUNTER — HOSPITAL ENCOUNTER (OUTPATIENT)
Dept: RADIOLOGY | Facility: HOSPITAL | Age: 64
Discharge: HOME | End: 2019-04-16
Attending: INTERNAL MEDICINE
Payer: COMMERCIAL

## 2019-04-16 DIAGNOSIS — C56.1 MALIGNANT NEOPLASM OF RIGHT OVARY: ICD-10-CM

## 2019-04-16 PROCEDURE — 71260 CT THORAX DX C+: CPT | Mod: 26,,, | Performed by: RADIOLOGY

## 2019-04-16 PROCEDURE — 74178 CT ABDOMEN PELVIS W WO CONTRAST: ICD-10-PCS | Mod: 26,,, | Performed by: RADIOLOGY

## 2019-04-16 PROCEDURE — 71260 CT CHEST WITH CONTRAST: ICD-10-PCS | Mod: 26,,, | Performed by: RADIOLOGY

## 2019-04-16 PROCEDURE — 25500020 PHARM REV CODE 255: Performed by: INTERNAL MEDICINE

## 2019-04-16 PROCEDURE — 74178 CT ABD&PLV WO CNTR FLWD CNTR: CPT | Mod: 26,,, | Performed by: RADIOLOGY

## 2019-04-16 PROCEDURE — 71260 CT THORAX DX C+: CPT | Mod: TC

## 2019-04-16 PROCEDURE — 74178 CT ABD&PLV WO CNTR FLWD CNTR: CPT | Mod: TC

## 2019-04-16 RX ADMIN — IOHEXOL 100 ML: 350 INJECTION, SOLUTION INTRAVENOUS at 11:04

## 2019-04-16 RX ADMIN — IOHEXOL 30 ML: 350 INJECTION, SOLUTION INTRAVENOUS at 09:04

## 2019-04-17 ENCOUNTER — OFFICE VISIT (OUTPATIENT)
Dept: GYNECOLOGIC ONCOLOGY | Facility: CLINIC | Age: 64
End: 2019-04-17
Payer: COMMERCIAL

## 2019-04-17 VITALS
HEART RATE: 80 BPM | HEIGHT: 67 IN | DIASTOLIC BLOOD PRESSURE: 84 MMHG | BODY MASS INDEX: 35.71 KG/M2 | SYSTOLIC BLOOD PRESSURE: 133 MMHG | WEIGHT: 227.5 LBS

## 2019-04-17 DIAGNOSIS — C78.6 PERITONEAL CARCINOMATOSIS: ICD-10-CM

## 2019-04-17 DIAGNOSIS — C56.1 MALIGNANT NEOPLASM OF RIGHT OVARY: Primary | ICD-10-CM

## 2019-04-17 PROCEDURE — 3079F PR MOST RECENT DIASTOLIC BLOOD PRESSURE 80-89 MM HG: ICD-10-PCS | Mod: CPTII,S$GLB,, | Performed by: OBSTETRICS & GYNECOLOGY

## 2019-04-17 PROCEDURE — 99214 OFFICE O/P EST MOD 30 MIN: CPT | Mod: S$GLB,,, | Performed by: OBSTETRICS & GYNECOLOGY

## 2019-04-17 PROCEDURE — 3075F SYST BP GE 130 - 139MM HG: CPT | Mod: CPTII,S$GLB,, | Performed by: OBSTETRICS & GYNECOLOGY

## 2019-04-17 PROCEDURE — 3079F DIAST BP 80-89 MM HG: CPT | Mod: CPTII,S$GLB,, | Performed by: OBSTETRICS & GYNECOLOGY

## 2019-04-17 PROCEDURE — 99999 PR PBB SHADOW E&M-EST. PATIENT-LVL III: ICD-10-PCS | Mod: PBBFAC,,, | Performed by: OBSTETRICS & GYNECOLOGY

## 2019-04-17 PROCEDURE — 99999 PR PBB SHADOW E&M-EST. PATIENT-LVL III: CPT | Mod: PBBFAC,,, | Performed by: OBSTETRICS & GYNECOLOGY

## 2019-04-17 PROCEDURE — 3008F BODY MASS INDEX DOCD: CPT | Mod: CPTII,S$GLB,, | Performed by: OBSTETRICS & GYNECOLOGY

## 2019-04-17 PROCEDURE — 3075F PR MOST RECENT SYSTOLIC BLOOD PRESS GE 130-139MM HG: ICD-10-PCS | Mod: CPTII,S$GLB,, | Performed by: OBSTETRICS & GYNECOLOGY

## 2019-04-17 PROCEDURE — 3008F PR BODY MASS INDEX (BMI) DOCUMENTED: ICD-10-PCS | Mod: CPTII,S$GLB,, | Performed by: OBSTETRICS & GYNECOLOGY

## 2019-04-17 PROCEDURE — 99214 PR OFFICE/OUTPT VISIT, EST, LEVL IV, 30-39 MIN: ICD-10-PCS | Mod: S$GLB,,, | Performed by: OBSTETRICS & GYNECOLOGY

## 2019-04-17 RX ORDER — BIOTIN 1 MG
1000 TABLET ORAL DAILY
COMMUNITY

## 2019-04-17 NOTE — PROGRESS NOTES
Subjective:      Patient ID: Casie Gaines is a 63 y.o. female.    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  CA-125 is down to 32 from greater than 2000.  CT performed yesterday showed resolution of her effusion and a majority of her disease except for that surrounding her right ureter.  Tolerating therapy ok.  Denies F/C, N/V, VB.  Per her genetics were normal.  Review of Systems   Constitutional: Negative for activity change, appetite change, chills, fatigue and fever.   HENT: Negative for hearing loss, mouth sores, nosebleeds, sore throat and tinnitus.    Eyes: Negative for visual disturbance.   Respiratory: Negative for cough, chest tightness, shortness of breath and wheezing.    Cardiovascular: Negative for chest pain and leg swelling.   Gastrointestinal: Positive for diarrhea. Negative for abdominal distention, abdominal pain, blood in stool, constipation, nausea and vomiting.   Genitourinary: Negative for dysuria, flank pain, frequency, hematuria, pelvic pain, vaginal bleeding, vaginal discharge and vaginal pain.   Musculoskeletal: Negative for arthralgias and back pain.   Skin: Negative for rash.   Neurological: Negative for dizziness, seizures, syncope, weakness and numbness.   Hematological: Does not bruise/bleed easily.   Psychiatric/Behavioral: Negative for confusion and sleep disturbance. The patient is not nervous/anxious.        Objective:   Physical Exam:   Constitutional: She appears well-developed and well-nourished. No distress.    HENT:   Head: Normocephalic and atraumatic.    Eyes: No scleral icterus.    Neck: Normal range of motion. Neck supple.    Cardiovascular: Normal rate and intact distal pulses.  Exam reveals no cyanosis and no edema.     Pulmonary/Chest: Effort  normal. No respiratory distress. She exhibits no tenderness.        Abdominal: Soft. She exhibits no distension, no fluid wave, no ascites and no mass. There is no tenderness. There is no rebound and no guarding. No hernia.     Genitourinary: Rectum normal and vagina normal. Pelvic exam was performed with patient supine. There is no rash, tenderness or lesion on the right labia. There is no rash, tenderness or lesion on the left labia. Uterus is absent. There is an absent adnexa. Right adnexum displays no mass, no tenderness and no fullness. Left adnexum displays no mass, no tenderness and no fullness. No bleeding or unspecified prolapse of vaginal walls in the vagina. No vaginal discharge found. Vaginal cuff normal.Labial bartholins normal.Cervix exhibits absence.              Lymphadenopathy:     She has no cervical adenopathy.        Right: No inguinal adenopathy present.        Left: No inguinal adenopathy present.     Skin: No cyanosis.        Assessment:     1. Malignant neoplasm of right ovary    2. Peritoneal carcinomatosis        Plan:       Excellent response to treatment based on CT and CA-125.  Given her retroperitoneal persistent disease, would recommend we give an additional 3 cycles of treatment and then operate.  I can't operated on her in next month regardless.  Will let Dr. Trevizo know.  Also would plan on Avastin maintenance following primary treatment.

## 2019-04-30 ENCOUNTER — LAB VISIT (OUTPATIENT)
Dept: LAB | Facility: HOSPITAL | Age: 64
End: 2019-04-30
Attending: INTERNAL MEDICINE
Payer: COMMERCIAL

## 2019-04-30 ENCOUNTER — OFFICE VISIT (OUTPATIENT)
Dept: HEMATOLOGY/ONCOLOGY | Facility: CLINIC | Age: 64
End: 2019-04-30
Payer: COMMERCIAL

## 2019-04-30 VITALS
WEIGHT: 231.06 LBS | DIASTOLIC BLOOD PRESSURE: 85 MMHG | TEMPERATURE: 98 F | OXYGEN SATURATION: 99 % | HEIGHT: 67 IN | SYSTOLIC BLOOD PRESSURE: 146 MMHG | BODY MASS INDEX: 36.27 KG/M2 | HEART RATE: 86 BPM

## 2019-04-30 DIAGNOSIS — C56.1 MALIGNANT NEOPLASM OF RIGHT OVARY: Primary | ICD-10-CM

## 2019-04-30 DIAGNOSIS — C56.9 MALIGNANT NEOPLASM OF OVARY, UNSPECIFIED LATERALITY: ICD-10-CM

## 2019-04-30 DIAGNOSIS — C56.1 MALIGNANT NEOPLASM OF RIGHT OVARY: ICD-10-CM

## 2019-04-30 LAB
ALBUMIN SERPL BCP-MCNC: 3.6 G/DL (ref 3.5–5.2)
ALP SERPL-CCNC: 116 U/L (ref 55–135)
ALT SERPL W/O P-5'-P-CCNC: 18 U/L (ref 10–44)
ANION GAP SERPL CALC-SCNC: 12 MMOL/L (ref 8–16)
AST SERPL-CCNC: 20 U/L (ref 10–40)
BASOPHILS # BLD AUTO: 0.01 K/UL (ref 0–0.2)
BASOPHILS NFR BLD: 0.2 % (ref 0–1.9)
BILIRUB SERPL-MCNC: 0.4 MG/DL (ref 0.1–1)
BUN SERPL-MCNC: 15 MG/DL (ref 8–23)
CALCIUM SERPL-MCNC: 10.1 MG/DL (ref 8.7–10.5)
CANCER AG125 SERPL-ACNC: 12 U/ML (ref 0–30)
CHLORIDE SERPL-SCNC: 106 MMOL/L (ref 95–110)
CO2 SERPL-SCNC: 26 MMOL/L (ref 23–29)
CREAT SERPL-MCNC: 0.8 MG/DL (ref 0.5–1.4)
DIFFERENTIAL METHOD: ABNORMAL
EOSINOPHIL # BLD AUTO: 0 K/UL (ref 0–0.5)
EOSINOPHIL NFR BLD: 0.3 % (ref 0–8)
ERYTHROCYTE [DISTWIDTH] IN BLOOD BY AUTOMATED COUNT: 18.7 % (ref 11.5–14.5)
EST. GFR  (AFRICAN AMERICAN): >60 ML/MIN/1.73 M^2
EST. GFR  (NON AFRICAN AMERICAN): >60 ML/MIN/1.73 M^2
GLUCOSE SERPL-MCNC: 95 MG/DL (ref 70–110)
HCT VFR BLD AUTO: 31.6 % (ref 37–48.5)
HGB BLD-MCNC: 10.1 G/DL (ref 12–16)
LYMPHOCYTES # BLD AUTO: 2.2 K/UL (ref 1–4.8)
LYMPHOCYTES NFR BLD: 38.6 % (ref 18–48)
MCH RBC QN AUTO: 27.4 PG (ref 27–31)
MCHC RBC AUTO-ENTMCNC: 32 G/DL (ref 32–36)
MCV RBC AUTO: 86 FL (ref 82–98)
MONOCYTES # BLD AUTO: 0.5 K/UL (ref 0.3–1)
MONOCYTES NFR BLD: 8.3 % (ref 4–15)
NEUTROPHILS # BLD AUTO: 3.1 K/UL (ref 1.8–7.7)
NEUTROPHILS NFR BLD: 52.6 % (ref 38–73)
PLATELET # BLD AUTO: 87 K/UL (ref 150–350)
PMV BLD AUTO: 9.3 FL (ref 9.2–12.9)
POTASSIUM SERPL-SCNC: 3.7 MMOL/L (ref 3.5–5.1)
PROT SERPL-MCNC: 6.8 G/DL (ref 6–8.4)
RBC # BLD AUTO: 3.68 M/UL (ref 4–5.4)
SODIUM SERPL-SCNC: 144 MMOL/L (ref 136–145)
WBC # BLD AUTO: 5.8 K/UL (ref 3.9–12.7)

## 2019-04-30 PROCEDURE — 99999 PR PBB SHADOW E&M-EST. PATIENT-LVL III: CPT | Mod: PBBFAC,,, | Performed by: INTERNAL MEDICINE

## 2019-04-30 PROCEDURE — 85025 COMPLETE CBC W/AUTO DIFF WBC: CPT

## 2019-04-30 PROCEDURE — 3008F BODY MASS INDEX DOCD: CPT | Mod: CPTII,S$GLB,, | Performed by: INTERNAL MEDICINE

## 2019-04-30 PROCEDURE — 3008F PR BODY MASS INDEX (BMI) DOCUMENTED: ICD-10-PCS | Mod: CPTII,S$GLB,, | Performed by: INTERNAL MEDICINE

## 2019-04-30 PROCEDURE — 3077F SYST BP >= 140 MM HG: CPT | Mod: CPTII,S$GLB,, | Performed by: INTERNAL MEDICINE

## 2019-04-30 PROCEDURE — 99215 PR OFFICE/OUTPT VISIT, EST, LEVL V, 40-54 MIN: ICD-10-PCS | Mod: S$GLB,,, | Performed by: INTERNAL MEDICINE

## 2019-04-30 PROCEDURE — 80053 COMPREHEN METABOLIC PANEL: CPT

## 2019-04-30 PROCEDURE — 3077F PR MOST RECENT SYSTOLIC BLOOD PRESSURE >= 140 MM HG: ICD-10-PCS | Mod: CPTII,S$GLB,, | Performed by: INTERNAL MEDICINE

## 2019-04-30 PROCEDURE — 36415 COLL VENOUS BLD VENIPUNCTURE: CPT

## 2019-04-30 PROCEDURE — 99215 OFFICE O/P EST HI 40 MIN: CPT | Mod: S$GLB,,, | Performed by: INTERNAL MEDICINE

## 2019-04-30 PROCEDURE — 99999 PR PBB SHADOW E&M-EST. PATIENT-LVL III: ICD-10-PCS | Mod: PBBFAC,,, | Performed by: INTERNAL MEDICINE

## 2019-04-30 PROCEDURE — 3079F PR MOST RECENT DIASTOLIC BLOOD PRESSURE 80-89 MM HG: ICD-10-PCS | Mod: CPTII,S$GLB,, | Performed by: INTERNAL MEDICINE

## 2019-04-30 PROCEDURE — 86304 IMMUNOASSAY TUMOR CA 125: CPT

## 2019-04-30 PROCEDURE — 3079F DIAST BP 80-89 MM HG: CPT | Mod: CPTII,S$GLB,, | Performed by: INTERNAL MEDICINE

## 2019-04-30 RX ORDER — EPINEPHRINE 0.3 MG/.3ML
0.3 INJECTION SUBCUTANEOUS ONCE AS NEEDED
Status: CANCELLED | OUTPATIENT
Start: 2019-04-30

## 2019-04-30 RX ORDER — FAMOTIDINE 10 MG/ML
20 INJECTION INTRAVENOUS
Status: CANCELLED | OUTPATIENT
Start: 2019-04-30

## 2019-04-30 RX ORDER — DIPHENHYDRAMINE HYDROCHLORIDE 50 MG/ML
50 INJECTION INTRAMUSCULAR; INTRAVENOUS ONCE AS NEEDED
Status: CANCELLED | OUTPATIENT
Start: 2019-04-30

## 2019-04-30 RX ORDER — HEPARIN 100 UNIT/ML
500 SYRINGE INTRAVENOUS
Status: CANCELLED | OUTPATIENT
Start: 2019-04-30

## 2019-04-30 RX ORDER — SODIUM CHLORIDE 0.9 % (FLUSH) 0.9 %
10 SYRINGE (ML) INJECTION
Status: CANCELLED | OUTPATIENT
Start: 2019-04-30

## 2019-04-30 NOTE — PROGRESS NOTES
Hematology/Oncology Office Note    Cc:  Follow-up on pathology    Diagnosis:  Stage IV ovarian cancer  peritoneal carcinomatosis with malignant pleural effusion    Treatment:  02/28/2019 carboplatin/Taxol/Avastin cycle 1 day 1    Ms. Gaines is a 63-year-old female with PMH significant for HTN, hyperlipidemia, presents to [facility] ED complaining of progressively worsening shortness of breath associated with left abdominal discomfort.  She reports chills but no fever.  Complains of shortness of breath without cough, congestion.  Denies hemoptysis or recent weight loss.  In the ED chest x-ray revealed large right sided pleural effusion.  CT abdomen revealed numerous soft tissue masses within the left kimani abdomen, suggestive of peritoneal carcinomatosis.  Patient has no known history of malignancy.      Patient underwent CT-guided biopsy of omental mass which demonstrated carcinoma consistent with ovarian primary.  In addition cytology from thoracentesis was positive for malignancy.  She underwent IVP for right-sided hydronephrosis and the patient underwent right ureteral stenting by Dr. Santiago.    I have reviewed and updated the HPI, ROS, PMHx, Social Hx, Family Hx and treatment history.    Today's visit:  Patient presents prior to cycle 4.  She has no new complaints today.  She specifically denies nausea/vomiting/diarrhea/shortness of breath, or dizziness.      Treatment:    Surveillance:      Past Medical History:   Diagnosis Date    Anemia     Anxiety     Arthritis     knees    Cancer     ovarian    CHF (congestive heart failure)     Hyperlipemia     Hypertension     Neck pain          Social History:  Former smoker quit in 10/2008  No alcohol or illicit drugs      Family History: family history includes Anesthesia problems in her other; Breast cancer in her maternal aunt and paternal aunt; Colon cancer in her brother; Ovarian cancer in her paternal aunt.        HPI    Review of Systems  "  Constitutional: Positive for fatigue. Negative for activity change, chills, diaphoresis, fever and unexpected weight change.   HENT: Negative for congestion, dental problem, ear discharge, facial swelling, hearing loss, nosebleeds, postnasal drip, sinus pressure, sinus pain, tinnitus and trouble swallowing.    Eyes: Negative.    Respiratory: Negative for apnea, cough, shortness of breath, wheezing and stridor.    Cardiovascular: Negative for chest pain, palpitations and leg swelling.   Gastrointestinal: Positive for abdominal distention and abdominal pain. Negative for anal bleeding, blood in stool, constipation, diarrhea and vomiting.   Endocrine: Negative.    Genitourinary: Negative for decreased urine volume, difficulty urinating, dysuria, enuresis, flank pain, frequency, hematuria, menstrual problem, pelvic pain and urgency.        Right flank pain   Musculoskeletal: Negative for arthralgias, back pain, gait problem, joint swelling, myalgias, neck pain and neck stiffness.   Skin: Negative for color change, pallor, rash and wound.   Allergic/Immunologic: Negative.    Neurological: Negative for dizziness, tremors, seizures, facial asymmetry, speech difficulty, numbness and headaches.   Hematological: Negative for adenopathy. Does not bruise/bleed easily.   Psychiatric/Behavioral: Negative for agitation, behavioral problems, decreased concentration, dysphoric mood, hallucinations, self-injury and sleep disturbance. The patient is not nervous/anxious and is not hyperactive.        Objective:         Vitals:    04/30/19 1434   BP: (!) 146/85   Pulse: 86   Temp: 97.5 °F (36.4 °C)   TempSrc: Oral   SpO2: 99%   Weight: 104.8 kg (231 lb 0.7 oz)   Height: 5' 7" (1.702 m)     Physical Exam   Constitutional: She is oriented to person, place, and time. She appears well-developed and well-nourished.   Well groomed   HENT:   Head: Normocephalic and atraumatic. Not macrocephalic. Head is without laceration. Hair is normal. "   Right Ear: External ear normal.   Left Ear: External ear normal.   Nose: Nose normal.   Mouth/Throat: Oropharynx is clear and moist. No oropharyngeal exudate.   Eyes: Pupils are equal, round, and reactive to light. Conjunctivae and EOM are normal. Right eye exhibits no discharge. Left eye exhibits no discharge.   Neck: Normal range of motion. Neck supple. No JVD present. No tracheal deviation present. No thyromegaly present.   Cardiovascular: Normal rate, regular rhythm, normal heart sounds and intact distal pulses. Exam reveals no friction rub.   No murmur heard.  Pulmonary/Chest: Effort normal and breath sounds normal. No accessory muscle usage or stridor. No respiratory distress. She has no decreased breath sounds. She has no wheezes. She has no rhonchi. She has no rales.   Abdominal: Soft. Bowel sounds are normal. There is no tenderness. There is no rebound and no guarding.   Musculoskeletal: Normal range of motion. She exhibits no edema.   Lymphadenopathy:     She has no cervical adenopathy.   Neurological: She is alert and oriented to person, place, and time. She displays normal reflexes. No cranial nerve deficit. She exhibits normal muscle tone. Coordination normal.   Skin: Skin is warm, dry and intact. Capillary refill takes less than 2 seconds. No rash noted. She is not diaphoretic. No cyanosis. No pallor. Nails show no clubbing.   Psychiatric: She has a normal mood and affect. Her behavior is normal. Judgment and thought content normal.       Lab Results   Component Value Date    WBC 5.80 04/30/2019    HGB 10.1 (L) 04/30/2019    HCT 31.6 (L) 04/30/2019    MCV 86 04/30/2019    PLT 87 (L) 04/30/2019     Lab Results   Component Value Date    CREATININE 0.7 04/09/2019    BUN 9 04/09/2019     04/09/2019    K 3.8 04/09/2019     04/09/2019    CO2 24 04/09/2019     Lab Results   Component Value Date    ALT 22 04/09/2019    AST 23 04/09/2019    ALKPHOS 118 04/09/2019    BILITOT 0.5 04/09/2019        Assessment:       63-year-old female status post hospitalization for new diagnosis of peritoneal carcinomatosis with malignant right pleural effusion.  Final pathology is consistent with ovarian primary with CA 2740.  PET scan demonstrates multiple omental and peritoneal avid masses consistent with peritoneal carcinomatosis, extensive retroperitoneal and mediastinal lymphadenopathy, masslike structure in the right iliac fossa likely representing ovarian mass.  Patient has been evaluated by GYN Oncology Dr. Juarez and MD Green with recommendations for him carboplatin/Taxol/Avastin.  She will follow up with Dr. Juarez after cycle 3.    She is status post cycle 1 of carboplatin/Taxol/Avastin on 02/28/2019 with 1 episode of nausea/vomiting/diarrhea which occurred 4-5 days following treatment and sounds like viral gastroenteritis.  It resolved within 24-48 hours without residual GI side effects.   Genetic screening via [employer] demonstrated no clinically significant mutations, however, the patient did have 3 variants of on uncertain significance  24 hr urine demonstrated 1.4 g of protein over 24 hr therefore we can continue Avastin without interruption.  We will repeat CBC prior to chemotherapy on Thursday 05/02/2019 to ensure platelet count remains stable.        Stage IV ovarian cancer/peritoneal carcinomatosis with malignant right pleural effusion:  Negative genetic screening  --carboplatin/Taxol/Avastin Q 3 weeks  --cycle 1 day 1 02/28/2019  --proceed with cycle 4 on 05/02/2019 provided platelet count is adequate  --repeat 24 hr urine for protein quantification after next cycle      Proteinuria: 1.4 grams over 24 hours--OK to continue avastin  --repeat 24 hour urine/protien after next cycle

## 2019-05-02 ENCOUNTER — LAB VISIT (OUTPATIENT)
Dept: LAB | Facility: HOSPITAL | Age: 64
End: 2019-05-02
Attending: INTERNAL MEDICINE
Payer: COMMERCIAL

## 2019-05-02 DIAGNOSIS — C56.1 MALIGNANT NEOPLASM OF RIGHT OVARY: Primary | ICD-10-CM

## 2019-05-02 DIAGNOSIS — C56.9 MALIGNANT NEOPLASM OF OVARY, UNSPECIFIED LATERALITY: ICD-10-CM

## 2019-05-02 DIAGNOSIS — C56.1 MALIGNANT NEOPLASM OF RIGHT OVARY: ICD-10-CM

## 2019-05-02 LAB
BASOPHILS # BLD AUTO: 0.02 K/UL (ref 0–0.2)
BASOPHILS NFR BLD: 0.3 % (ref 0–1.9)
DIFFERENTIAL METHOD: ABNORMAL
EOSINOPHIL # BLD AUTO: 0 K/UL (ref 0–0.5)
EOSINOPHIL NFR BLD: 0.5 % (ref 0–8)
ERYTHROCYTE [DISTWIDTH] IN BLOOD BY AUTOMATED COUNT: 19.1 % (ref 11.5–14.5)
HCT VFR BLD AUTO: 32.2 % (ref 37–48.5)
HGB BLD-MCNC: 10.1 G/DL (ref 12–16)
LYMPHOCYTES # BLD AUTO: 2 K/UL (ref 1–4.8)
LYMPHOCYTES NFR BLD: 30.5 % (ref 18–48)
MCH RBC QN AUTO: 27.1 PG (ref 27–31)
MCHC RBC AUTO-ENTMCNC: 31.4 G/DL (ref 32–36)
MCV RBC AUTO: 86 FL (ref 82–98)
MONOCYTES # BLD AUTO: 0.4 K/UL (ref 0.3–1)
MONOCYTES NFR BLD: 5.3 % (ref 4–15)
NEUTROPHILS # BLD AUTO: 4.2 K/UL (ref 1.8–7.7)
NEUTROPHILS NFR BLD: 63.4 % (ref 38–73)
PLATELET # BLD AUTO: 90 K/UL (ref 150–350)
PMV BLD AUTO: 9.4 FL (ref 9.2–12.9)
RBC # BLD AUTO: 3.73 M/UL (ref 4–5.4)
WBC # BLD AUTO: 6.58 K/UL (ref 3.9–12.7)

## 2019-05-02 PROCEDURE — 85025 COMPLETE CBC W/AUTO DIFF WBC: CPT

## 2019-05-02 PROCEDURE — 36415 COLL VENOUS BLD VENIPUNCTURE: CPT

## 2019-05-07 ENCOUNTER — DOCUMENTATION ONLY (OUTPATIENT)
Dept: HEMATOLOGY/ONCOLOGY | Facility: CLINIC | Age: 64
End: 2019-05-07

## 2019-05-07 ENCOUNTER — INITIAL CONSULT (OUTPATIENT)
Dept: PULMONOLOGY | Facility: CLINIC | Age: 64
End: 2019-05-07
Payer: COMMERCIAL

## 2019-05-07 ENCOUNTER — INFUSION (OUTPATIENT)
Dept: INFUSION THERAPY | Facility: HOSPITAL | Age: 64
End: 2019-05-07
Attending: INTERNAL MEDICINE
Payer: COMMERCIAL

## 2019-05-07 VITALS
SYSTOLIC BLOOD PRESSURE: 152 MMHG | HEIGHT: 67 IN | BODY MASS INDEX: 35.9 KG/M2 | DIASTOLIC BLOOD PRESSURE: 84 MMHG | OXYGEN SATURATION: 98 % | RESPIRATION RATE: 18 BRPM | WEIGHT: 228.75 LBS | HEART RATE: 96 BPM

## 2019-05-07 VITALS — HEART RATE: 95 BPM | DIASTOLIC BLOOD PRESSURE: 87 MMHG | SYSTOLIC BLOOD PRESSURE: 145 MMHG

## 2019-05-07 DIAGNOSIS — C78.6 PERITONEAL CARCINOMATOSIS: ICD-10-CM

## 2019-05-07 DIAGNOSIS — I10 ESSENTIAL HYPERTENSION: ICD-10-CM

## 2019-05-07 DIAGNOSIS — E78.00 PURE HYPERCHOLESTEROLEMIA: ICD-10-CM

## 2019-05-07 DIAGNOSIS — C56.1 MALIGNANT NEOPLASM OF RIGHT OVARY: ICD-10-CM

## 2019-05-07 DIAGNOSIS — G47.33 OSA (OBSTRUCTIVE SLEEP APNEA): Primary | ICD-10-CM

## 2019-05-07 DIAGNOSIS — C56.1 MALIGNANT NEOPLASM OF RIGHT OVARY: Primary | ICD-10-CM

## 2019-05-07 DIAGNOSIS — I27.9 PULMONARY HEART DISEASE, UNSPECIFIED: ICD-10-CM

## 2019-05-07 PROBLEM — J90 PLEURAL EFFUSION ON RIGHT: Status: RESOLVED | Noted: 2019-01-26 | Resolved: 2019-05-07

## 2019-05-07 PROCEDURE — 99999 PR PBB SHADOW E&M-EST. PATIENT-LVL IV: CPT | Mod: PBBFAC,,, | Performed by: INTERNAL MEDICINE

## 2019-05-07 PROCEDURE — 96415 CHEMO IV INFUSION ADDL HR: CPT

## 2019-05-07 PROCEDURE — 3008F PR BODY MASS INDEX (BMI) DOCUMENTED: ICD-10-PCS | Mod: CPTII,S$GLB,, | Performed by: INTERNAL MEDICINE

## 2019-05-07 PROCEDURE — 3077F PR MOST RECENT SYSTOLIC BLOOD PRESSURE >= 140 MM HG: ICD-10-PCS | Mod: CPTII,S$GLB,, | Performed by: INTERNAL MEDICINE

## 2019-05-07 PROCEDURE — S0028 INJECTION, FAMOTIDINE, 20 MG: HCPCS | Performed by: INTERNAL MEDICINE

## 2019-05-07 PROCEDURE — 99215 PR OFFICE/OUTPT VISIT, EST, LEVL V, 40-54 MIN: ICD-10-PCS | Mod: S$GLB,,, | Performed by: INTERNAL MEDICINE

## 2019-05-07 PROCEDURE — 96413 CHEMO IV INFUSION 1 HR: CPT

## 2019-05-07 PROCEDURE — 96375 TX/PRO/DX INJ NEW DRUG ADDON: CPT

## 2019-05-07 PROCEDURE — 3079F DIAST BP 80-89 MM HG: CPT | Mod: CPTII,S$GLB,, | Performed by: INTERNAL MEDICINE

## 2019-05-07 PROCEDURE — 63600175 PHARM REV CODE 636 W HCPCS: Mod: JG | Performed by: INTERNAL MEDICINE

## 2019-05-07 PROCEDURE — 96417 CHEMO IV INFUS EACH ADDL SEQ: CPT

## 2019-05-07 PROCEDURE — 3077F SYST BP >= 140 MM HG: CPT | Mod: CPTII,S$GLB,, | Performed by: INTERNAL MEDICINE

## 2019-05-07 PROCEDURE — 99215 OFFICE O/P EST HI 40 MIN: CPT | Mod: S$GLB,,, | Performed by: INTERNAL MEDICINE

## 2019-05-07 PROCEDURE — 99999 PR PBB SHADOW E&M-EST. PATIENT-LVL IV: ICD-10-PCS | Mod: PBBFAC,,, | Performed by: INTERNAL MEDICINE

## 2019-05-07 PROCEDURE — 96367 TX/PROPH/DG ADDL SEQ IV INF: CPT

## 2019-05-07 PROCEDURE — 3008F BODY MASS INDEX DOCD: CPT | Mod: CPTII,S$GLB,, | Performed by: INTERNAL MEDICINE

## 2019-05-07 PROCEDURE — 3079F PR MOST RECENT DIASTOLIC BLOOD PRESSURE 80-89 MM HG: ICD-10-PCS | Mod: CPTII,S$GLB,, | Performed by: INTERNAL MEDICINE

## 2019-05-07 PROCEDURE — 25000003 PHARM REV CODE 250: Performed by: INTERNAL MEDICINE

## 2019-05-07 RX ORDER — URINARY ANTISEPTIC ANTISPASMODIC 81.6; 40.8; 10.8; .12 MG/1; MG/1; MG/1; MG/1
1 TABLET ORAL 4 TIMES DAILY
COMMUNITY
End: 2019-10-29

## 2019-05-07 RX ORDER — HEPARIN 100 UNIT/ML
500 SYRINGE INTRAVENOUS
Status: DISCONTINUED | OUTPATIENT
Start: 2019-05-07 | End: 2019-05-07 | Stop reason: HOSPADM

## 2019-05-07 RX ORDER — FAMOTIDINE 10 MG/ML
20 INJECTION INTRAVENOUS
Status: COMPLETED | OUTPATIENT
Start: 2019-05-07 | End: 2019-05-07

## 2019-05-07 RX ADMIN — DEXAMETHASONE SODIUM PHOSPHATE: 4 INJECTION, SOLUTION INTRA-ARTICULAR; INTRALESIONAL; INTRAMUSCULAR; INTRAVENOUS; SOFT TISSUE at 10:05

## 2019-05-07 RX ADMIN — DIPHENHYDRAMINE HYDROCHLORIDE 50 MG: 50 INJECTION, SOLUTION INTRAMUSCULAR; INTRAVENOUS at 10:05

## 2019-05-07 RX ADMIN — PACLITAXEL 396 MG: 6 INJECTION, SOLUTION INTRAVENOUS at 12:05

## 2019-05-07 RX ADMIN — HEPARIN 500 UNITS: 100 SYRINGE at 03:05

## 2019-05-07 RX ADMIN — FAMOTIDINE 20 MG: 10 INJECTION, SOLUTION INTRAVENOUS at 10:05

## 2019-05-07 RX ADMIN — BEVACIZUMAB 1600 MG: 400 INJECTION, SOLUTION INTRAVENOUS at 11:05

## 2019-05-07 RX ADMIN — CARBOPLATIN 870 MG: 10 INJECTION, SOLUTION INTRAVENOUS at 03:05

## 2019-05-07 NOTE — PATIENT INSTRUCTIONS
Pulmonary Hypertension  Pulmonary hypertension is high pressure in the blood vessels that carry blood into the lungs. This strains the lungs and heart and can lead to serious problems.  Systemic hypertension means the pressure is too high in blood vessels throughout the body. A person with pulmonary hypertension may also have systemic hypertension.   What causes pulmonary hypertension?  The cause of pulmonary hypertension is sometimes unknown. But it is most often caused by another health problem. In many cases, controlling this problem can help prevent or control pulmonary hypertension. Some of the most common causes of pulmonary hypertension are:  In children  · Severe lung problems in a   · Lung conditions, such as cystic fibrosis or interstitial lung disease  · Heart disease  · Congenital heart defects  · HIV infection  · Other conditions, such as scleroderma, lupus, or sickle cell disease  In adults  · Lung conditions, such as chronic obstructive pulmonary disease (COPD), advanced bronchitis, cystic fibrosis, or pulmonary fibrosis  · Liver disease  · Blood clots in the lungs  · Left-sided heart failure  · HIV infection  · Sleep apnea  · Other conditions, such as scleroderma, lupus, or sickle cell disease  What are the symptoms of pulmonary hypertension?  Symptoms may come on suddenly. Or, they may come on slowly over time. Symptoms can include:  · Shortness of breath  · Blue lips or fingernails (signs that the body is having trouble getting oxygen)  · Tiring quickly, especially when active  · Fast heartbeat  · Pain in the upper right side of the abdomen  · Swelling in the legs or ankles  · Chest pain or pressure  · Fainting or dizzy spells  How is pulmonary hypertension diagnosed?  Your healthcare provider will examine you and listen to your heart and lungs. Your blood pressure will also be measured. Tests may be done as well. These may include:  · Blood tests. These measure certain body functions.  They also check for problems such as infection.  · A chest X-ray. This takes a picture of the inside of the chest. It can show certain heart and lung problems.  · An electrocardiogram (ECG). This test records the hearts electrical activity.  · An echocardiogram (echo). This test uses sound waves to create a moving picture of the heart.  · Pulmonary function tests. These tests measure breathing and lung capacity.  · Cardiac catheterization. This procedure gives detailed information about the hearts structures. A thin tube (catheter) is put into a blood vessel in the groin or neck and guided into the right side of the heart. Certain blood pressure tests are then done.  How is pulmonary hypertension treated?   Treatment depends on your age, health, and the severity of your symptoms. Any underlying health problems you have will be treated. Treatment may also include:  · Oxygen  · Medicine to lower the pressure in the lung blood vessels  · Medicine to help the body lose excess water  · Medicine to prevent blood clots  · Medicine that help the heart beat stronger, pump more blood and control abnormal heart rhythms  What are the long-term concerns?  Though pulmonary hypertension has no cure, certain treatments may relieve symptoms and slow progression of the disease. In rare and severe cases, a lung transplant may be needed. Your healthcare provider can tell you more about this if needed.  When you should call your healthcare provider  Call your healthcare provider right away if you have any of the following:  · Persistent blueness of lips or fingernails  · Shortness of breath  · Fever of 100.4°F (38.0°C) or higher  · Fainting spells   Date Last Reviewed: 1/1/2017  © 9352-8050 [employer]. [address] [place] [address], [place] [address]. All rights reserved. This information is not intended as a substitute for professional medical care. Always follow your healthcare professional's instructions.

## 2019-05-07 NOTE — PLAN OF CARE
Problem: Adult Inpatient Plan of Care  Goal: Plan of Care Review  Outcome: Ongoing (interventions implemented as appropriate)  Pt states has no real complaints today

## 2019-05-07 NOTE — DISCHARGE INSTRUCTIONS
[facility]  [address] [facility]  [address]  [phone] phone     [phone] fax  Hours of Operation: Monday- Friday 8:00am- 5:00pm  After hours phone  [phone]  Hematology / Oncology Physicians on call      Dr. Varinder Mckeon      Please call with any concerns regarding your appointment today.    HOME CARE AFTER CHEMOTHERAPY   Meals   Many patients feel sick and lose their appetites during treatment. Eat small meals several times a day. Choose bland foods with little taste or smell if you have problems with nausea. Be sure to cook all food thoroughly. This kills bacteria and helps you avoid intestinal infection. Soft foods are easier to swallow and digest.   Activity   Exercise keeps you strong and keeps your heart and lungs active. Talk to your doctor about an appropriate exercise program for you.   Skin Care   To prevent a skin infection, bathe or shower once a day. Use a moisturizing soap and wash with warm water. Avoid very hot or cold water. Chemotherapy can make your skin dry . Apply moisturizing lotion to help relieve dry skin. Some drugs used in high doses can cause slight burns to appear (like sunburn). Ask for a special cream to help relieve the burn and protect your skin.   Prevent Mouth Sores   During chemotherapy, many people get mouth sores. Do the following to help prevent mouth sores or to ease discomfort.   Brush your teeth with a soft-bristle toothbrush after every meal.  Don't use dental floss if your platelet count is below 50,000. Your doctor or nurse will tell you if this is the case.  Use an oral swab or special soft toothbrush if your gums bleed during regular brushing.  Use mouthwash as directed. If you can't tolerate commercial mouthwash, use salt and baking soda to clean your mouth. Mix 1 teaspoon of salt and 1 teaspoon of baking soda into a glass of water. Swish and spit.  Call your  doctor or return to this facility if you develop any of the following:   Sore throat   White patches in the mouth or throat   Fever of 100.4ºF (38ºC) or higher, or as directed by your healthcare provider  © 2000-2011 [facility] [place], [address] [place] [address]. All rights reserved. This information is not intended as a substitute for professional medical care. Always follow your healthcare professional's   FALL PREVENTION   Falls often occur due to slipping, tripping or losing your balance. Here are ways to reduce your risk of falling again.   Was there anything that caused your fall that can be fixed, removed or replaced?   Make your home safe by keeping walkways clear of objects you may trip over.   Use non-slip pads under rugs.   Do not walk in poorly lit areas.   Do not stand on chairs or wobbly ladders.   Use caution when reaching overhead or looking upward. This position can cause a loss of balance.   Be sure your shoes fit properly, have non-slip bottoms and are in good condition.   Be cautious when going up and down stairs, curbs, and when walking on uneven sidewalks.   If your balance is poor, consider using a cane or walker.   If your fall was related to alcohol use, stop or limit alcohol intake.   If your fall was related to use of sleeping medicines, talk to your doctor about this. You may need to reduce your dosage at bedtime if you awaken during the night to go to the bathroom.   To reduce the need for nighttime bathroom trips:   Avoid drinking fluids for several hours before going to bed   Empty your bladder before going to bed   Men can keep a urinal at the bedside   © 2000-2011 [facility] [place], [address] [place] [address]. All rights reserved. This information is not intended as a substitute for professional medical care. Always follow your healthcare professional's instructions.  WAYS TO HELP PREVENT INFECTION         WASH YOUR HANDS OFTEN DURING THE DAY, ESPECIALLY  BEFORE YOU EAT, AFTER USING THE BATHROOM, AND AFTER TOUCHING ANIMALS     STAY AWAY FROM PEOPLE WHO HAVE ILLNESSES YOU CAN CATCH; SUCH AS COLDS, FLU, CHICKEN POX     TRY TO AVOID CROWDS     STAY AWAY FROM CHILDREN WHO RECENTLY HAVE RECEIVED LIVE VIRUS VACCINES     MAINTAIN GOOD MOUTH CARE     DO NOT SQUEEZE OR SCRATCH PIMPLES     CLEAN CUTS & SCRAPES RIGHT AWAY AND DAILY UNTIL HEALED WITH WARM WATER, SOAP & AN ANTISEPTIC     AVOID CONTACT WITH LITTER BOXES, BIRD CAGES, & FISH TANKS     AVOID STANDING WATER, IE., BIRD BATHS, FLOWER POTS/VASES, OR HUMIDIFIERS     WEAR GLOVES WHEN GARDENING OR CLEANING UP AFTER OTHERS, ESPECIALLY BABIES & SMALL CHILDREN     DO NOT EAT RAW FISH, SEAFOOD, MEAT, OR EGGS

## 2019-05-07 NOTE — LETTER
May 7, 2019      Blake Estrada MD  [address]  [place] [address]           [facility]  [address] [place] [id number]  Phone: [phone]  Fax: [phone]          Patient: Casie Gaines   MR Number: [id number]   YOB: 1955   Date of Visit: 5/7/2019       Dear Dr. Blake Estrada:    Thank you for referring Casie Gaines to me for evaluation. Attached you will find relevant portions of my assessment and plan of care.    If you have questions, please do not hesitate to call me. I look forward to following Casie Gaines along with you.    Sincerely,    Bandar Bee MD    Enclosure  CC:  No Recipients    If you would like to receive this communication electronically, please contact [email or web address] or [phone] to request more information on [employer] [facility] access.    For providers and/or their staff who would like to refer a patient to [facility], please contact us through our one-stop-shop provider referral line, [facility], at [phone].    If you feel you have received this communication in error or would no longer like to receive these types of communications, please e-mail [email or web address]

## 2019-05-07 NOTE — PROGRESS NOTES
VU met with pt briefly at her request today regarding assistance with her terminal illness claim. SW review ppwk and complete the medication information section and returned to pt as it did not require signature. VU encouraged pt to notify us if there was additional info needed for her claim. SW will f/u as requested.

## 2019-05-07 NOTE — PROGRESS NOTES
Subjective:       Patient ID: Casie Gaines is a 63 y.o. female.    Chief Complaint:    Ms Casie Gaines is 63 years old  She is referred for Abnormal ECho : See Dr Blake Estrada  [facility] Pleural effusion, edema with new diagnosis of ovarian cancer.  Currently on Chemo, tolerated well except alopecia  [place] score:3  Comorbidity; HTN, HLD,   STI questionnaire reviewed.  Bedtime irregular wake-up time 7:00 a.m. to 8:00 a.m..    Sleep latency 15-20 minutes.  Denies any restless leg symptoms.  Deliberately sleeps less in order to get things done.    Endorses snoring, nocturnal diaphoresis, witnessed apnea and gasping for air at night.  Dry mouth at night.  Sore taste in the mouth.  Will make wakes up with headaches.  Difficulty falling asleep.  Immunizations up-to-date.  Exposures to mold asbestos or tuberculosis.  Twenty-five pack-year smoking history  Not on any inhalers.    STOP - BANG Questionnaire:     1. Snoring : Do you snore loudly ?    Yes    2. Tired : Do you often feel tired, fatigued, or sleepy during daytime? Yes    3. Observed: Has anyone observed you stop breathing during your sleep?   Yes     4. Blood pressure : Do you have or are you being treated for high blood pressure?   Yes    5. BMI :BMI more than 35 kg/m2?   Yes    6. Age : Age over 50 yr old?   Yes    7. Neck circumference: Neck circumference greater than 40 cm?   No    8. Gender: Gender male?   No    High risk of JERALD: Yes 5 - 8  Intermediate risk of JERALD: Yes 3 - 4  Low risk of JERALD: Yes 0 - 2      References:   STOP Questionnaire   A Tool to Screen Patients for Obstructive Sleep Apnea: LISSETTE NorrisR.C.P.C., ELI hCou.B.B.S., Roberta Coulter M.D.,Sayra Jang, Ph.D., ELI Burrell.B.B.S.,_ Florian Banks.,_ Terry Levi M.D., [employer] F[employer]R.C.P.C.; Anesthesiology 2008; 108:812-21 Copyright © 2008, the American Society of Anesthesiologists, Inc. [employer]  [employer].        The following portions of the patient's history were reviewed and updated as appropriate:   She  has a past medical history of Anemia, Anxiety, Arthritis, Cancer, CHF (congestive heart failure), Hyperlipemia, Hypertension, and Neck pain.  She does not have any pertinent problems on file.  She  has a past surgical history that includes  section; Dilation and curettage of uterus; Hysterectomy; Tubal ligation; breast reduction (10/02/2018); Cystoscopy w/ ureteral stent placement (Right, 2019); Retrograde pyelography (Right, 2019); and Insertion of venous access port (Left, 2019).  Her family history includes Anesthesia problems in her other; Breast cancer in her maternal aunt and paternal aunt; Colon cancer in her brother; Ovarian cancer in her paternal aunt.  She  reports that she quit smoking about 7 months ago. She has a 25.00 pack-year smoking history. She has never used smokeless tobacco. She reports that she does not drink alcohol or use drugs.  She has a current medication list which includes the following prescription(s): albuterol, biotin, dexamethasone, hydrocodone-acetaminophen, lidocaine-prilocaine, methen-sod phos-meth blue-hyos, multivitamin with minerals, olmesartan-hydrochlorothiazide, ondansetron, oxycodone-acetaminophen, prochlorperazine, rosuvastatin, tolterodine, and zinc gluconate, and the following Facility-Administered Medications: heparin, porcine (pf).  Current Outpatient Medications on File Prior to Visit   Medication Sig Dispense Refill    albuterol 90 mcg/actuation inhaler Inhale 2 puffs into the lungs every 4 (four) hours as needed for Wheezing. Rescue 18 g 0    biotin 1 mg tablet Take 1,000 mcg by mouth 3 (three) times daily.      dexamethasone (DECADRON) 4 MG Tab Decadron 20 mg (5 tablets) by mouth 12 and 6 hr prior to chemotherapy 20 tablet 3    HYDROcodone-acetaminophen (NORCO) 5-325 mg per tablet Take 1 tablet by mouth every 6 (six) hours as  "needed for Pain. 10 tablet 0    lidocaine-prilocaine (EMLA) cream Apply topically as needed. 30 g 1    methen-sod phos-meth blue-hyos (UROGESIC-BLUE) 81.6-40.8-0.12 mg Tab Take 1 tablet by mouth 4 (four) times daily.      multivitamin with minerals tablet Take 1 tablet by mouth once daily.       olmesartan-hydrochlorothiazide (BENICAR HCT) 40-12.5 mg Tab Take 1 tablet by mouth once daily. 90 tablet 1    ondansetron (ZOFRAN-ODT) 8 MG TbDL Take 1 tablet (8 mg total) by mouth every 8 (eight) hours as needed. 30 tablet 5    oxyCODONE-acetaminophen (PERCOCET)  mg per tablet Take 1 tablet by mouth every 8 (eight) hours as needed. 20 tablet 0    prochlorperazine (COMPAZINE) 10 MG tablet Take 1 tablet (10 mg total) by mouth every 6 (six) hours as needed. 30 tablet 5    rosuvastatin (CRESTOR) 10 MG tablet Take 1 tablet (10 mg total) by mouth once daily. 90 tablet 1    tolterodine (DETROL) 2 MG Tab Take 1 tablet (2 mg total) by mouth 2 (two) times daily. 60 tablet 11    zinc gluconate 50 mg tablet Take 50 mg by mouth once daily.       No current facility-administered medications on file prior to visit.      She has No Known Allergies..       Review of Systems   Constitutional: Positive for malaise/fatigue.   HENT: Negative.    Eyes: Negative.    Respiratory:        Snoring   Cardiovascular: Negative.    Gastrointestinal: Negative.    Genitourinary: Negative.    Musculoskeletal: Negative.    Skin: Negative.    Neurological: Negative.    Endo/Heme/Allergies: Negative.    Psychiatric/Behavioral: Negative.          Objective:     Vitals:    05/07/19 0812   BP: (!) 152/84   Pulse: 96   Resp: 18   SpO2: 98%   Weight: 103.8 kg (228 lb 11.6 oz)   Height: 5' 7" (1.702 m)     Physical Exam   Constitutional: She is oriented to person, place, and time. Vital signs are normal. She appears well-developed and well-nourished. She does not have a sickly appearance. She does not appear ill.       HENT:   Head: Normocephalic and " "atraumatic.   Nose: Nose normal.   Mouth/Throat: Oropharynx is clear and moist. No oropharyngeal exudate.   malampati score 4   Eyes: Pupils are equal, round, and reactive to light. Conjunctivae and EOM are normal.   Neck: Normal range of motion. Neck supple.   Neck 16"   Cardiovascular: Normal rate, regular rhythm, normal heart sounds and intact distal pulses.   No murmur heard.  Pulmonary/Chest: Effort normal and breath sounds normal. No stridor. No respiratory distress. She has no rales. She exhibits no tenderness.   Abdominal: Soft. Bowel sounds are normal.   Musculoskeletal: Normal range of motion. She exhibits no edema.   Neurological: She is alert and oriented to person, place, and time. No cranial nerve deficit. Coordination normal.   Skin: Skin is warm and dry. Capillary refill takes 2 to 3 seconds.   Psychiatric: She has a normal mood and affect.   Nursing note and vitals reviewed.        Data Review:   CBC:   Lab Results   Component Value Date    WBC 8.08 05/07/2019    RBC 3.80 (L) 05/07/2019    HGB 10.6 (L) 05/07/2019    HCT 33.2 (L) 05/07/2019     05/07/2019     BMP:   Lab Results   Component Value Date    GLU 95 04/30/2019     04/30/2019    K 3.7 04/30/2019     04/30/2019    CO2 26 04/30/2019    BUN 15 04/30/2019    CREATININE 0.8 04/30/2019    CALCIUM 10.1 04/30/2019     Radiology review:   Diagnostics:    CT of chest performed on 04/2019    1. Interval resolution of the right-sided pleural effusion.  No pulmonary nodules or masses identified.  2. Interval placement of a right ureteral stent with resolution of the previously noted hydronephrosis.  There is persistent periureteral fat stranding.  There are also soft tissue densities seen adjacent to the majority of the right ureter likely representing peritoneal implants and or retroperitoneal adenopathy.  The largest soft tissue density within the right hemipelvis is smaller when compared to the prior exam and now measures a maximum " of 4 cm as compared to 4.7 cm on the prior exam.  There is also what appears to represent partial thrombosis of the right ovarian vein that is similar in appearance to the prior exam.  3. Remaining findings as discussed in detail above.   Assessment:      Problem List Items Addressed This Visit     Hypertension    Current Assessment & Plan     Stable monitored by PCP         Hyperlipidemia    Current Assessment & Plan     Stable monitored by PCP         Peritoneal carcinomatosis    Malignant neoplasm of right ovary    Pulmonary heart disease, unspecified    Current Assessment & Plan     PAmean = 0.6 x PASP + 2 mm Hg  33.2mmhg  VQ  Sleep study  PFT  ABG  6MWD           Relevant Orders    Complete PFT without bronchodilator - Clinic    Stress test, pulmonary    NM Lung Scan Ventilation Perfusion    LOYD    ANTI-NEUTROPHILIC CYTOPLASMIC ANTIBODY    MYELOPEROXIDASE ANTIBODY (MPO)    PULM - Arterial Blood Gases--in addition to PFT only      Other Visit Diagnoses     JERALD (obstructive sleep apnea)    -  Primary    Relevant Orders    Polysomnogram (CPAP will be added if patient meets diagnostic criteria.)         Plan:     Discussed pathophysiology of pulmonary hypertension.  Likely etiology is group 2 or group 3.     Follow up in about 6 weeks (around 6/18/2019), or [place] score, VQ, PFT. 6MWD, ABG, Labs.    This note was prepared using voice recognition system and is likely to have sound alike errors that may have been overlooked even after proof reading.  Please call me with any questions    Discussed diagnosis, its evaluation, treatment and usual course. All questions answered.    Thank you for the courtesy of participating in the care of this patient    Bandar Bee MD

## 2019-05-13 ENCOUNTER — TELEPHONE (OUTPATIENT)
Dept: HEMATOLOGY/ONCOLOGY | Facility: CLINIC | Age: 64
End: 2019-05-13

## 2019-05-13 NOTE — TELEPHONE ENCOUNTER
----- Message from Rossana Rinaldi sent at 5/13/2019  8:46 AM CDT -----  Contact: Self  Patient requesting a call back regarding medical records being released to insurance company. She states that her insurance company is being told that she does not have a release on file. Please call patient back at [phone]      Thanks,  Rossana Rinaldi

## 2019-05-21 ENCOUNTER — HOSPITAL ENCOUNTER (OUTPATIENT)
Dept: RADIOLOGY | Facility: HOSPITAL | Age: 64
Discharge: HOME | End: 2019-05-21
Attending: INTERNAL MEDICINE
Payer: COMMERCIAL

## 2019-05-21 DIAGNOSIS — I27.9 PULMONARY HEART DISEASE, UNSPECIFIED: ICD-10-CM

## 2019-05-21 PROCEDURE — A9540 TC99M MAA: HCPCS

## 2019-05-21 PROCEDURE — 71046 X-RAY EXAM CHEST 2 VIEWS: CPT | Mod: TC

## 2019-05-21 PROCEDURE — A9539 TC99M PENTETATE: HCPCS

## 2019-05-27 ENCOUNTER — OFFICE VISIT (OUTPATIENT)
Dept: HEMATOLOGY/ONCOLOGY | Facility: CLINIC | Age: 64
End: 2019-05-27
Payer: COMMERCIAL

## 2019-05-27 ENCOUNTER — LAB VISIT (OUTPATIENT)
Dept: LAB | Facility: HOSPITAL | Age: 64
End: 2019-05-27
Attending: INTERNAL MEDICINE
Payer: COMMERCIAL

## 2019-05-27 VITALS
DIASTOLIC BLOOD PRESSURE: 72 MMHG | HEART RATE: 90 BPM | BODY MASS INDEX: 35.43 KG/M2 | TEMPERATURE: 97 F | WEIGHT: 225.75 LBS | OXYGEN SATURATION: 98 % | SYSTOLIC BLOOD PRESSURE: 137 MMHG | HEIGHT: 67 IN | RESPIRATION RATE: 18 BRPM

## 2019-05-27 DIAGNOSIS — C56.1 MALIGNANT NEOPLASM OF RIGHT OVARY: Primary | ICD-10-CM

## 2019-05-27 DIAGNOSIS — D69.6 THROMBOCYTOPENIA: ICD-10-CM

## 2019-05-27 DIAGNOSIS — C56.9 MALIGNANT NEOPLASM OF OVARY, UNSPECIFIED LATERALITY: ICD-10-CM

## 2019-05-27 DIAGNOSIS — C78.6 PERITONEAL CARCINOMATOSIS: ICD-10-CM

## 2019-05-27 DIAGNOSIS — C56.1 MALIGNANT NEOPLASM OF RIGHT OVARY: ICD-10-CM

## 2019-05-27 PROCEDURE — 99215 PR OFFICE/OUTPT VISIT, EST, LEVL V, 40-54 MIN: ICD-10-PCS | Mod: S$GLB,,, | Performed by: INTERNAL MEDICINE

## 2019-05-27 PROCEDURE — 99999 PR PBB SHADOW E&M-EST. PATIENT-LVL III: ICD-10-PCS | Mod: PBBFAC,,, | Performed by: INTERNAL MEDICINE

## 2019-05-27 PROCEDURE — 3078F PR MOST RECENT DIASTOLIC BLOOD PRESSURE < 80 MM HG: ICD-10-PCS | Mod: CPTII,S$GLB,, | Performed by: INTERNAL MEDICINE

## 2019-05-27 PROCEDURE — 99999 PR PBB SHADOW E&M-EST. PATIENT-LVL III: CPT | Mod: PBBFAC,,, | Performed by: INTERNAL MEDICINE

## 2019-05-27 PROCEDURE — 3075F SYST BP GE 130 - 139MM HG: CPT | Mod: CPTII,S$GLB,, | Performed by: INTERNAL MEDICINE

## 2019-05-27 PROCEDURE — 3008F BODY MASS INDEX DOCD: CPT | Mod: CPTII,S$GLB,, | Performed by: INTERNAL MEDICINE

## 2019-05-27 PROCEDURE — 3008F PR BODY MASS INDEX (BMI) DOCUMENTED: ICD-10-PCS | Mod: CPTII,S$GLB,, | Performed by: INTERNAL MEDICINE

## 2019-05-27 PROCEDURE — 84156 ASSAY OF PROTEIN URINE: CPT

## 2019-05-27 PROCEDURE — 3078F DIAST BP <80 MM HG: CPT | Mod: CPTII,S$GLB,, | Performed by: INTERNAL MEDICINE

## 2019-05-27 PROCEDURE — 3075F PR MOST RECENT SYSTOLIC BLOOD PRESS GE 130-139MM HG: ICD-10-PCS | Mod: CPTII,S$GLB,, | Performed by: INTERNAL MEDICINE

## 2019-05-27 PROCEDURE — 99215 OFFICE O/P EST HI 40 MIN: CPT | Mod: S$GLB,,, | Performed by: INTERNAL MEDICINE

## 2019-05-27 RX ORDER — EPINEPHRINE 0.3 MG/.3ML
0.3 INJECTION SUBCUTANEOUS ONCE AS NEEDED
Status: CANCELLED | OUTPATIENT
Start: 2019-05-27

## 2019-05-27 RX ORDER — SODIUM CHLORIDE 0.9 % (FLUSH) 0.9 %
10 SYRINGE (ML) INJECTION
Status: CANCELLED | OUTPATIENT
Start: 2019-05-27

## 2019-05-27 RX ORDER — FAMOTIDINE 10 MG/ML
20 INJECTION INTRAVENOUS
Status: CANCELLED | OUTPATIENT
Start: 2019-05-27

## 2019-05-27 RX ORDER — HEPARIN 100 UNIT/ML
500 SYRINGE INTRAVENOUS
Status: CANCELLED | OUTPATIENT
Start: 2019-05-27

## 2019-05-27 RX ORDER — DIPHENHYDRAMINE HYDROCHLORIDE 50 MG/ML
50 INJECTION INTRAMUSCULAR; INTRAVENOUS ONCE AS NEEDED
Status: CANCELLED | OUTPATIENT
Start: 2019-05-27

## 2019-05-27 NOTE — PROGRESS NOTES
Hematology/Oncology Office Note    Cc:  Follow-up on pathology    Diagnosis:  Stage IV ovarian cancer  peritoneal carcinomatosis with malignant pleural effusion    Treatment:  02/28/2019 carboplatin/Taxol/Avastin cycle 1 day 1    Ms. Gaines is a 63-year-old female with PMH significant for HTN, hyperlipidemia, presents to [facility] ED complaining of progressively worsening shortness of breath associated with left abdominal discomfort.  She reports chills but no fever.  Complains of shortness of breath without cough, congestion.  Denies hemoptysis or recent weight loss.  In the ED chest x-ray revealed large right sided pleural effusion.  CT abdomen revealed numerous soft tissue masses within the left kimani abdomen, suggestive of peritoneal carcinomatosis.  Patient has no known history of malignancy.      Patient underwent CT-guided biopsy of omental mass which demonstrated carcinoma consistent with ovarian primary.  In addition cytology from thoracentesis was positive for malignancy.  She underwent IVP for right-sided hydronephrosis and the patient underwent right ureteral stenting by Dr. Santiago.    I have reviewed and updated the HPI, ROS, PMHx, Social Hx, Family Hx and treatment history.    Today's visit:  Patient presents prior to cycle 5.  She reports significant fatigue and decreased functional status after cycle 4.  She reports that she was unable to get out of bed for 10 days following cycle 4    Treatment:    Surveillance:      Past Medical History:   Diagnosis Date    Anemia     Anxiety     Arthritis     knees    Cancer     ovarian    CHF (congestive heart failure)     Hyperlipemia     Hypertension     Neck pain          Social History:  Former smoker quit in 10/2008  No alcohol or illicit drugs      Family History: family history includes Anesthesia problems in her other; Breast cancer in her maternal aunt and paternal aunt; Colon cancer in her brother; Ovarian cancer in her paternal  "aunt.        HPI    Review of Systems   Constitutional: Positive for activity change, appetite change, fatigue and unexpected weight change. Negative for diaphoresis and fever.   HENT: Negative for congestion, dental problem, drooling, ear discharge, ear pain, facial swelling, hearing loss, mouth sores, nosebleeds, postnasal drip and sinus pain.    Eyes: Negative.    Respiratory: Negative for apnea, cough, shortness of breath, wheezing and stridor.    Cardiovascular: Negative for chest pain, palpitations and leg swelling.   Gastrointestinal: Positive for abdominal distention and abdominal pain. Negative for anal bleeding, blood in stool, constipation, diarrhea and vomiting.   Endocrine: Negative.    Genitourinary: Negative for difficulty urinating, dysuria, enuresis, flank pain, frequency, hematuria, menstrual problem and pelvic pain.        Right flank pain   Musculoskeletal: Negative for arthralgias, back pain, gait problem, joint swelling, myalgias, neck pain and neck stiffness.   Skin: Negative for color change, pallor, rash and wound.   Allergic/Immunologic: Negative.    Neurological: Negative for dizziness, facial asymmetry, speech difficulty, light-headedness, numbness and headaches.   Hematological: Negative for adenopathy. Does not bruise/bleed easily.   Psychiatric/Behavioral: Negative for agitation, behavioral problems, decreased concentration, dysphoric mood, hallucinations, self-injury and sleep disturbance. The patient is not nervous/anxious and is not hyperactive.        Objective:         Vitals:    05/27/19 1050   BP: 137/72   Pulse: 90   Resp: 18   Temp: 97.2 °F (36.2 °C)   TempSrc: Oral   SpO2: 98%   Weight: 102.4 kg (225 lb 12 oz)   Height: 5' 7" (1.702 m)     Physical Exam   Constitutional: She is oriented to person, place, and time. She appears well-developed and well-nourished. No distress.   Well groomed   HENT:   Head: Normocephalic and atraumatic. Not macrocephalic. Head is without " laceration. Hair is normal.   Right Ear: External ear normal.   Left Ear: External ear normal.   Nose: Nose normal.   Mouth/Throat: Oropharynx is clear and moist.   Eyes: Pupils are equal, round, and reactive to light. Conjunctivae and EOM are normal.   Neck: Normal range of motion. Neck supple. No tracheal deviation present. No thyromegaly present.   Cardiovascular: Normal rate, regular rhythm, normal heart sounds and intact distal pulses. Exam reveals no friction rub.   No murmur heard.  Pulmonary/Chest: Effort normal and breath sounds normal. No accessory muscle usage or stridor. No respiratory distress. She has no decreased breath sounds. She has no wheezes. She has no rhonchi. She has no rales.   Abdominal: Soft. Bowel sounds are normal. She exhibits no distension and no mass. There is no tenderness. There is no rebound and no guarding.   Musculoskeletal: Normal range of motion. She exhibits no edema.   Neurological: She is alert and oriented to person, place, and time. She displays normal reflexes. No cranial nerve deficit. She exhibits normal muscle tone. Coordination normal.   Skin: Skin is warm, dry and intact. Capillary refill takes less than 2 seconds. No rash noted. She is not diaphoretic. No pallor.   Psychiatric: She has a normal mood and affect. Her behavior is normal. Judgment and thought content normal.       Lab Results   Component Value Date    WBC 6.16 05/27/2019    HGB 9.9 (L) 05/27/2019    HCT 30.7 (L) 05/27/2019    MCV 90 05/27/2019     (L) 05/27/2019     Lab Results   Component Value Date    CREATININE 0.9 05/27/2019    BUN 14 05/27/2019     05/27/2019    K 3.8 05/27/2019     05/27/2019    CO2 25 05/27/2019     Lab Results   Component Value Date    ALT 20 05/27/2019    AST 21 05/27/2019    ALKPHOS 104 05/27/2019    BILITOT 0.5 05/27/2019       Assessment:       63-year-old female status post hospitalization for new diagnosis of peritoneal carcinomatosis with malignant right  pleural effusion.  Final pathology is consistent with ovarian primary with CA 2740.  PET scan demonstrates multiple omental and peritoneal avid masses consistent with peritoneal carcinomatosis, extensive retroperitoneal and mediastinal lymphadenopathy, masslike structure in the right iliac fossa likely representing ovarian mass.  Patient has been evaluated by GYN Oncology Dr. Juarez and MD Green with recommendations for him carboplatin/Taxol/Avastin.  She will follow up with Dr. Juarez after cycle 3.    She is status post cycle 1 of carboplatin/Taxol/Avastin on 02/28/2019 with 1 episode of nausea/vomiting/diarrhea which occurred 4-5 days following treatment and sounds like viral gastroenteritis.  It resolved within 24-48 hours without residual GI side effects.   Genetic screening via [employer] demonstrated no clinically significant mutations, however, the patient did have 3 variants of on uncertain significance  24 hr urine demonstrated 1.4 g of protein over 24 hr with repeat 24 hr urine pending from today.  We will dose reduce carbo Taxol by 20% due to debilitating fatigue following cycle 4 (she was nearly bed bound for 7-10 days).  She will proceed with treatment tomorrow in follow-up in 3 weeks for cycle 6.  Patient will follow up with Dr. Juarez after cycle 6.         Stage IV ovarian cancer/peritoneal carcinomatosis with malignant right pleural effusion:  Negative genetic screening  --carboplatin/Taxol/Avastin Q 3 weeks  --cycle 1 day 1 02/28/2019  --proceed with cycle 5 on 5/20 08/2019 with 20% dose reduction due to debilitating fatigue  --follow-up with Dr Juarez after cycle 6  --24 hr urine for protein pending    Proteinuria: 1.4 grams over 24 hours--OK to continue avastin  --repeat 24 hour pending      Anemia/thrombocytopenia secondary to chemotherapy:  --continue to monitor closely

## 2019-05-28 ENCOUNTER — INFUSION (OUTPATIENT)
Dept: INFUSION THERAPY | Facility: HOSPITAL | Age: 64
End: 2019-05-28
Attending: INTERNAL MEDICINE
Payer: COMMERCIAL

## 2019-05-28 VITALS — HEART RATE: 92 BPM | DIASTOLIC BLOOD PRESSURE: 85 MMHG | SYSTOLIC BLOOD PRESSURE: 149 MMHG

## 2019-05-28 DIAGNOSIS — C78.6 PERITONEAL CARCINOMATOSIS: ICD-10-CM

## 2019-05-28 DIAGNOSIS — I10 ESSENTIAL HYPERTENSION: ICD-10-CM

## 2019-05-28 DIAGNOSIS — C56.1 MALIGNANT NEOPLASM OF RIGHT OVARY: Primary | ICD-10-CM

## 2019-05-28 LAB
PROT 24H UR-MRATE: 569 MG/SPEC (ref 0–100)
PROT UR-MCNC: 35 MG/DL (ref 0–15)
URINE COLLECTION DURATION: 24 HR
URINE VOLUME: 1625 ML

## 2019-05-28 PROCEDURE — 25000003 PHARM REV CODE 250: Performed by: INTERNAL MEDICINE

## 2019-05-28 PROCEDURE — 96375 TX/PRO/DX INJ NEW DRUG ADDON: CPT

## 2019-05-28 PROCEDURE — 96415 CHEMO IV INFUSION ADDL HR: CPT

## 2019-05-28 PROCEDURE — 96413 CHEMO IV INFUSION 1 HR: CPT

## 2019-05-28 PROCEDURE — 96367 TX/PROPH/DG ADDL SEQ IV INF: CPT

## 2019-05-28 PROCEDURE — 96417 CHEMO IV INFUS EACH ADDL SEQ: CPT

## 2019-05-28 PROCEDURE — 63600175 PHARM REV CODE 636 W HCPCS: Performed by: INTERNAL MEDICINE

## 2019-05-28 PROCEDURE — S0028 INJECTION, FAMOTIDINE, 20 MG: HCPCS | Performed by: INTERNAL MEDICINE

## 2019-05-28 RX ORDER — SODIUM CHLORIDE 0.9 % (FLUSH) 0.9 %
10 SYRINGE (ML) INJECTION
Status: DISCONTINUED | OUTPATIENT
Start: 2019-05-28 | End: 2019-05-28 | Stop reason: HOSPADM

## 2019-05-28 RX ORDER — HEPARIN 100 UNIT/ML
500 SYRINGE INTRAVENOUS
Status: DISCONTINUED | OUTPATIENT
Start: 2019-05-28 | End: 2019-05-28 | Stop reason: HOSPADM

## 2019-05-28 RX ORDER — FAMOTIDINE 10 MG/ML
20 INJECTION INTRAVENOUS
Status: COMPLETED | OUTPATIENT
Start: 2019-05-28 | End: 2019-05-28

## 2019-05-28 RX ORDER — OLMESARTAN MEDOXOMIL AND HYDROCHLOROTHIAZIDE 40/12.5 40; 12.5 MG/1; MG/1
1 TABLET ORAL DAILY
Qty: 90 TABLET | Refills: 0 | Status: SHIPPED | OUTPATIENT
Start: 2019-05-28 | End: 2019-10-22 | Stop reason: SDUPTHER

## 2019-05-28 RX ADMIN — BEVACIZUMAB 1600 MG: 400 INJECTION, SOLUTION INTRAVENOUS at 10:05

## 2019-05-28 RX ADMIN — DIPHENHYDRAMINE HYDROCHLORIDE 50 MG: 50 INJECTION, SOLUTION INTRAMUSCULAR; INTRAVENOUS at 09:05

## 2019-05-28 RX ADMIN — PACLITAXEL 318 MG: 6 INJECTION, SOLUTION INTRAVENOUS at 11:05

## 2019-05-28 RX ADMIN — FAMOTIDINE 20 MG: 10 INJECTION, SOLUTION INTRAVENOUS at 10:05

## 2019-05-28 RX ADMIN — CARBOPLATIN 660 MG: 10 INJECTION, SOLUTION INTRAVENOUS at 02:05

## 2019-05-28 RX ADMIN — HEPARIN 500 UNITS: 100 SYRINGE at 03:05

## 2019-05-28 RX ADMIN — DEXAMETHASONE SODIUM PHOSPHATE: 4 INJECTION, SOLUTION INTRA-ARTICULAR; INTRALESIONAL; INTRAMUSCULAR; INTRAVENOUS; SOFT TISSUE at 10:05

## 2019-05-28 NOTE — DISCHARGE INSTRUCTIONS
[facility]  [address] [facility] [address]  [address]  [phone] phone     [phone] fax  Hours of Operation: Monday- Friday 8:00am- 5:00pm  After hours phone  [phone]  Hematology / Oncology Physicians on call      Dr. Varinder Mckeon      Please call with any concerns regarding your appointment today.    FALL PREVENTION   Falls often occur due to slipping, tripping or losing your balance. Here are ways to reduce your risk of falling again.   Was there anything that caused your fall that can be fixed, removed or replaced?   Make your home safe by keeping walkways clear of objects you may trip over.   Use non-slip pads under rugs.   Do not walk in poorly lit areas.   Do not stand on chairs or wobbly ladders.   Use caution when reaching overhead or looking upward. This position can cause a loss of balance.   Be sure your shoes fit properly, have non-slip bottoms and are in good condition.   Be cautious when going up and down stairs, curbs, and when walking on uneven sidewalks.   If your balance is poor, consider using a cane or walker.   If your fall was related to alcohol use, stop or limit alcohol intake.   If your fall was related to use of sleeping medicines, talk to your doctor about this. You may need to reduce your dosage at bedtime if you awaken during the night to go to the bathroom.   To reduce the need for nighttime bathroom trips:   Avoid drinking fluids for several hours before going to bed   Empty your bladder before going to bed   Men can keep a urinal at the bedside   © 6793-5667 [employer], [address] [place] [id number]. All rights reserved. This information is not intended as a substitute for professional medical care. Always follow your healthcare professional's instructions.  WAYS TO HELP PREVENT INFECTION         WASH YOUR HANDS OFTEN DURING THE DAY, ESPECIALLY BEFORE YOU EAT, AFTER  "USING THE BATHROOM, AND AFTER TOUCHING ANIMALS     STAY AWAY FROM PEOPLE WHO HAVE ILLNESSES YOU CAN CATCH; SUCH AS COLDS, FLU, CHICKEN POX     TRY TO AVOID CROWDS     STAY AWAY FROM CHILDREN WHO RECENTLY HAVE RECEIVED LIVE VIRUS VACCINES     MAINTAIN GOOD MOUTH CARE     DO NOT SQUEEZE OR SCRATCH PIMPLES     CLEAN CUTS & SCRAPES RIGHT AWAY AND DAILY UNTIL HEALED WITH WARM WATER, SOAP & AN ANTISEPTIC     AVOID CONTACT WITH LITTER BOXES, BIRD CAGES, & FISH TANKS     AVOID STANDING WATER, IE., BIRD BATHS, FLOWER POTS/VASES, OR HUMIDIFIERS     WEAR GLOVES WHEN GARDENING OR CLEANING UP AFTER OTHERS, ESPECIALLY BABIES & SMALL CHILDREN    DO NOT EAT RAW FISH, SEAFOOD, MEAT, OR EGGSSupport Groups/Classes    Support groups and classes are being offered at the   [facility] and [facility]!!    "Cooking with Cancer" (Nutrition Class):  Second Wednesday of each month   at noon at the [facility].  Metastatic Support Group:  Third Tuesday of each month   at noon at the [facility].  Next Steps Class/Group: Second and fourth Thursday of each month at noon at the [facility].  [facility] (Breast Cancer Support Group): First Tuesday of each month   at 5:30pm at the [facility] location.  StreetÂ LibraryÂ Network Mobile: [facility]: Second and third Tuesday of each month from 7:30am - 2pm.  [facility]: First and fourth Tuesday of each month from 7:30am - 2pm     If you are interested in attending or would like more information please ask our social workers or your nurse!  "

## 2019-05-28 NOTE — PLAN OF CARE
Problem: Adult Inpatient Plan of Care  Goal: Plan of Care Review  Outcome: Ongoing (interventions implemented as appropriate)  Pt stated she was nauseated and feeling tired last week

## 2019-05-31 ENCOUNTER — TELEPHONE (OUTPATIENT)
Dept: HEMATOLOGY/ONCOLOGY | Facility: CLINIC | Age: 64
End: 2019-05-31

## 2019-05-31 NOTE — TELEPHONE ENCOUNTER
----- Message from Azeb Childs LPN sent at 5/31/2019  9:26 AM CDT -----  Contact: Self      ----- Message -----  From: Rossana Rinaldi  Sent: 5/31/2019   9:13 AM  To: Linda Moore Staff    Patient requesting a call back regarding short term disablility papers. She states that they need to be faxed over to . Ms. Gaines was originally a patient of Dr. Trevizo. Please call back at [phone].    Thanks,  Rossana Rinaldi

## 2019-05-31 NOTE — TELEPHONE ENCOUNTER
----- Message from Azeb Childs LPN sent at 5/31/2019  9:26 AM CDT -----  Contact: Self      ----- Message -----  From: Rossana Rinaldi  Sent: 5/31/2019   9:13 AM  To: Linda Moore Staff    Patient requesting a call back regarding short term disablility papers. She states that they need to be faxed over to . Ms. Gaines was originally a patient of Dr. Trevizo. Please call back at [phone].    Thanks,  Rossana Rinaldi

## 2019-06-03 ENCOUNTER — TELEPHONE (OUTPATIENT)
Dept: HEMATOLOGY/ONCOLOGY | Facility: CLINIC | Age: 64
End: 2019-06-03

## 2019-06-03 NOTE — TELEPHONE ENCOUNTER
----- Message from Carole Perrin NP sent at 5/31/2019  4:39 PM CDT -----  Contact: Self  No, I am sorry.  Maybe check with Claudine and see if she has this.    ----- Message -----  From: Aminah Parker LPN  Sent: 5/31/2019   4:17 PM  To: ASHLEIGH Mackay, would you have paper work for ms fraser ?  ----- Message -----  From: Jennifer Pineda NP  Sent: 5/31/2019   1:35 PM  To: Aminah Parker LPN    I'm not currently working on any papers. I'm not sure if I've completed in the past  ----- Message -----  From: Aminah Parker LPN  Sent: 5/31/2019   1:30 PM  To: Jennifer Pinead NP    Ty do you have short term disability papers on ms looney?  ----- Message -----  From: Azeb Childs LPN  Sent: 5/31/2019   9:26 AM  To: Keisha Chawla Staff, Aydin Lam Staff, #        ----- Message -----  From: Rossana Rinaldi  Sent: 5/31/2019   9:13 AM  To: Linda Moore Staff    Patient requesting a call back regarding short term disablility papers. She states that they need to be faxed over to . Ms. Gaines was originally a patient of Dr. Trevizo. Please call back at [phone].    Thanks,  Rossana Rinaldi

## 2019-06-06 ENCOUNTER — OFFICE VISIT (OUTPATIENT)
Dept: INTERNAL MEDICINE | Facility: CLINIC | Age: 64
End: 2019-06-06
Payer: COMMERCIAL

## 2019-06-06 VITALS
OXYGEN SATURATION: 99 % | WEIGHT: 225.06 LBS | TEMPERATURE: 96 F | SYSTOLIC BLOOD PRESSURE: 124 MMHG | HEART RATE: 82 BPM | BODY MASS INDEX: 35.33 KG/M2 | HEIGHT: 67 IN | DIASTOLIC BLOOD PRESSURE: 86 MMHG

## 2019-06-06 DIAGNOSIS — Z01.818 PRE-OPERATIVE CLEARANCE: Primary | ICD-10-CM

## 2019-06-06 DIAGNOSIS — C56.9 MALIGNANT NEOPLASM OF OVARY, UNSPECIFIED LATERALITY: ICD-10-CM

## 2019-06-06 PROBLEM — Z96.0 STATUS POST PLACEMENT OF URETERAL STENT: Status: ACTIVE | Noted: 2019-02-08

## 2019-06-06 PROCEDURE — 99214 OFFICE O/P EST MOD 30 MIN: CPT | Mod: S$GLB,,, | Performed by: FAMILY MEDICINE

## 2019-06-06 PROCEDURE — 99214 PR OFFICE/OUTPT VISIT, EST, LEVL IV, 30-39 MIN: ICD-10-PCS | Mod: S$GLB,,, | Performed by: FAMILY MEDICINE

## 2019-06-06 PROCEDURE — 3074F SYST BP LT 130 MM HG: CPT | Mod: CPTII,S$GLB,, | Performed by: FAMILY MEDICINE

## 2019-06-06 PROCEDURE — 3074F PR MOST RECENT SYSTOLIC BLOOD PRESSURE < 130 MM HG: ICD-10-PCS | Mod: CPTII,S$GLB,, | Performed by: FAMILY MEDICINE

## 2019-06-06 PROCEDURE — 3079F DIAST BP 80-89 MM HG: CPT | Mod: CPTII,S$GLB,, | Performed by: FAMILY MEDICINE

## 2019-06-06 PROCEDURE — 3008F BODY MASS INDEX DOCD: CPT | Mod: CPTII,S$GLB,, | Performed by: FAMILY MEDICINE

## 2019-06-06 PROCEDURE — 99999 PR PBB SHADOW E&M-EST. PATIENT-LVL IV: CPT | Mod: PBBFAC,,, | Performed by: FAMILY MEDICINE

## 2019-06-06 PROCEDURE — 3008F PR BODY MASS INDEX (BMI) DOCUMENTED: ICD-10-PCS | Mod: CPTII,S$GLB,, | Performed by: FAMILY MEDICINE

## 2019-06-06 PROCEDURE — 99999 PR PBB SHADOW E&M-EST. PATIENT-LVL IV: ICD-10-PCS | Mod: PBBFAC,,, | Performed by: FAMILY MEDICINE

## 2019-06-06 PROCEDURE — 3079F PR MOST RECENT DIASTOLIC BLOOD PRESSURE 80-89 MM HG: ICD-10-PCS | Mod: CPTII,S$GLB,, | Performed by: FAMILY MEDICINE

## 2019-06-06 NOTE — PROGRESS NOTES
Casie Frias Givens  06/06/2019  [id number]    Rossana Ang MD  Patient Care Team:  Rossana Ang MD as PCP - General (Family Medicine)  Radha Foley LPN as Care Coordinator (Internal Medicine)  Has the patient seen any provider outside of the [facility] network since the last visit? (no). If yes, HIPPA forms completed and records requested.        Visit Type:Pre op clearance    Chief Complaint:  Need pre op clearance for renal stent, cystoscopy and retrievial and/or replacement, Dr. Love Gottlieb.       History of Present Illness:  PMH significant for HTN, hyperlipidemia, presents to [facility] [place] ED complaining of progressively worsening shortness of breath associated with left abdominal discomfort.  She reports chills but no fever.  Complains of shortness of breath without cough, congestion.  Denies hemoptysis or recent weight loss.  In the ED chest x-ray revealed large right sided pleural effusion.  CT abdomen revealed numerous soft tissue masses within the left kimani abdomen, suggestive of peritoneal carcinomatosis.  Patient has no known history of malignancy.       Patient underwent CT-guided biopsy of omental mass which demonstrated carcinoma consistent with ovarian primary.  In addition cytology from thoracentesis was positive for malignancy.  She underwent IVP for right-sided hydronephrosis and the patient underwent right ureteral stenting by Dr. Santiago, she then saw Dr. Francisco Gottlieb for second opinion on stent.      Final pathology is consistent with ovarian primary with CA 2740.  PET scan demonstrates multiple omental and peritoneal avid masses consistent with peritoneal carcinomatosis, extensive retroperitoneal and mediastinal lymphadenopathy, masslike structure in the right iliac fossa likely representing ovarian mass.  Patient has been evaluated by GYN Oncology Dr. Juarez and MD Green with recommendations for him carboplatin/Taxol/Avastin.  She had follow up with Dr. Juarez after cycle  3.    We will dose reduce carbo Taxol by 20% due to debilitating fatigue following cycle 4 (she was nearly bed bound for 7-10 days). She completed cycle 5 on . She feels good today.     Plan was to have complete 6 cycles and follow up with Dr. Juarez for operation for her cancer.    She has seen Pulm and Cards for SOB.  2 D echo, with PA pressure 52. Nuclear stress test 3/18. Negative for ischemia. No hypoxia noted. Denies any CP or SOB.     She is here to get stent replaced or removed from Urology  Needs clearance. Dr. Gottlieb, she wanted this done before her 6 session of chemo.    Labs reviewed from Dr. Trevizo.  She also had pre op labs done at [facility], and we are calling for those results.  She no longer has the uro symptoms of dysuria.    Recent CT 1. Interval resolution of the right-sided pleural effusion.  No pulmonary nodules or masses identified.  2. Interval placement of a right ureteral stent with resolution of the previously noted hydronephrosis.  There is persistent periureteral fat stranding.  There are also soft tissue densities seen adjacent to the majority of the right ureter likely representing peritoneal implants and or retroperitoneal adenopathy.  The largest soft tissue density within the right hemipelvis is smaller when compared to the prior exam and now measures a maximum of 4 cm as compared to 4.7 cm on the prior exam.  There is also what appears to represent partial thrombosis of the right ovarian vein that is similar in appearance to the prior exam.  3. Remaining findings as discussed in detail above.      History:  Past Medical History:   Diagnosis Date    Anemia     Anxiety     Arthritis     knees    Cancer     ovarian    CHF (congestive heart failure)     Hyperlipemia     Hypertension     Neck pain      Past Surgical History:   Procedure Laterality Date    breast reduction  10/02/2018     SECTION      X 1    CYSTOSCOPY, WITH URETERAL STENT INSERTION Right 2019     Performed by Timmy [facility]    DILATION AND CURETTAGE OF UTERUS      HYSTERECTOMY      RALH for fibroids (still has ovaries)    INSERTION, VENOUS ACCESS PORT Left 2019    Performed by Ulisses Monzon MD at [facility]    PLACEMENT, TRANSOBTURATOR TAPE N/A 2014    Performed by Sayra Aguilar MD at [facility]    PYELOGRAM, RETROGRADE Right 2019    Performed by Timmy Santiago IV, MD at [facility]    ROBOTIC HYSTERECTOMY N/A 2014    Performed by Sayra Aguilar MD at [facility]    TUBAL LIGATION       Family History   Problem Relation Age of Onset    Anesthesia problems Other     Breast cancer Maternal Aunt     Breast cancer Paternal Aunt     Ovarian cancer Paternal Aunt     Colon cancer Brother     Thrombophilia Neg Hx      Social History     Socioeconomic History    Marital status:      Spouse name: Not on file    Number of children: Not on file    Years of education: Not on file    Highest education level: Not on file   Occupational History    Not on file   Social Needs    Financial resource strain: Not on file    Food insecurity:     Worry: Not on file     Inability: Not on file    Transportation needs:     Medical: Not on file     Non-medical: Not on file   Tobacco Use    Smoking status: Former Smoker     Packs/day: 1.00     Years: 25.00     Pack years: 25.00     Last attempt to quit: 10/1/2018     Years since quittin.6    Smokeless tobacco: Never Used    Tobacco comment: States started quit 2 months ago after 30 years   Substance and Sexual Activity    Alcohol use: Never     Alcohol/week: 0.0 oz     Frequency: Never     Comment: occasionally  No alcohol 72h prior to sx    Drug use: No    Sexual activity: Not Currently     Partners: Male     Comment: hyst; mut monog   Lifestyle    Physical activity:     Days per week: Not on file     Minutes per session: Not on file    Stress: Not on file   Relationships    Social connections:      Talks on phone: Not on file     Gets together: Not on file     Attends Rastafari service: Not on file     Active member of club or organization: Not on file     Attends meetings of clubs or organizations: Not on file     Relationship status: Not on file   Other Topics Concern    Not on file   Social History Narrative    Not on file     Patient Active Problem List   Diagnosis    Hypertension    Osteoarthritis of both knees    History of CHF (congestive heart failure)    Hyperlipidemia    Peritoneal carcinomatosis    Morbid obesity    Status post placement of ureteral stent    Preop cardiovascular exam    Abnormal ECG    Malignant neoplasm of right ovary    Ovarian cancer    DAVIDSON (dyspnea on exertion)    Pulmonary heart disease, unspecified     Review of patient's allergies indicates:  No Known Allergies    The following were reviewed at this visit: active problem list, medication list, allergies, family history, social history, and health maintenance.    Medications:  Current Outpatient Medications on File Prior to Visit   Medication Sig Dispense Refill    albuterol 90 mcg/actuation inhaler Inhale 2 puffs into the lungs every 4 (four) hours as needed for Wheezing. Rescue 18 g 0    biotin 1 mg tablet Take 1,000 mcg by mouth 3 (three) times daily.      dexamethasone (DECADRON) 4 MG Tab Decadron 20 mg (5 tablets) by mouth 12 and 6 hr prior to chemotherapy 20 tablet 3    HYDROcodone-acetaminophen (NORCO) 5-325 mg per tablet Take 1 tablet by mouth every 6 (six) hours as needed for Pain. 10 tablet 0    multivitamin with minerals tablet Take 1 tablet by mouth once daily.       olmesartan-hydrochlorothiazide (BENICAR HCT) 40-12.5 mg Tab Take 1 tablet by mouth once daily. 90 tablet 0    ondansetron (ZOFRAN-ODT) 8 MG TbDL Take 1 tablet (8 mg total) by mouth every 8 (eight) hours as needed. 30 tablet 5    oxyCODONE-acetaminophen (PERCOCET)  mg per tablet Take 1 tablet by mouth every 8 (eight) hours  as needed. 20 tablet 0    prochlorperazine (COMPAZINE) 10 MG tablet Take 1 tablet (10 mg total) by mouth every 6 (six) hours as needed. 30 tablet 5    rosuvastatin (CRESTOR) 10 MG tablet Take 1 tablet (10 mg total) by mouth once daily. 90 tablet 1    zinc gluconate 50 mg tablet Take 50 mg by mouth once daily.      lidocaine-prilocaine (EMLA) cream Apply topically as needed. 30 g 1    methen-sod phos-meth blue-hyos (UROGESIC-BLUE) 81.6-40.8-0.12 mg Tab Take 1 tablet by mouth 4 (four) times daily.      tolterodine (DETROL) 2 MG Tab Take 1 tablet (2 mg total) by mouth 2 (two) times daily. 60 tablet 11     No current facility-administered medications on file prior to visit.        Medications have been reviewed and reconciled with patient at this visit.  Barriers to medications present (no)    Adverse reactions to current medications (yes)    Over the counter medications reviewed (Yes ), and if needed added to active Medication list at this visit.     Exam:  Wt Readings from Last 3 Encounters:   06/06/19 102.1 kg (225 lb 1.4 oz)   05/27/19 102.4 kg (225 lb 12 oz)   05/07/19 103.8 kg (228 lb 11.6 oz)     Temp Readings from Last 3 Encounters:   06/06/19 96.1 °F (35.6 °C) (Tympanic)   05/27/19 97.2 °F (36.2 °C) (Oral)   04/30/19 97.5 °F (36.4 °C) (Oral)     BP Readings from Last 3 Encounters:   06/06/19 124/86   05/28/19 (!) 149/85   05/27/19 137/72     Pulse Readings from Last 3 Encounters:   06/06/19 82   05/28/19 92   05/27/19 90     Body mass index is 35.25 kg/m².      Review of Systems   Constitutional: Negative.  Negative for chills and fever.   HENT: Negative.  Negative for congestion, sinus pain and sore throat.    Eyes: Negative for blurred vision and double vision.   Respiratory: Negative for cough, sputum production, shortness of breath and wheezing.    Cardiovascular: Negative for chest pain, palpitations and leg swelling.   Gastrointestinal: Negative for abdominal pain, constipation, diarrhea, heartburn,  nausea and vomiting.   Genitourinary: Negative.    Musculoskeletal: Negative.    Skin: Negative.  Negative for rash.   Neurological: Negative.    Endo/Heme/Allergies: Negative.  Negative for polydipsia. Does not bruise/bleed easily.   Psychiatric/Behavioral: Negative for depression and substance abuse.     Physical Exam   Constitutional: She is oriented to person, place, and time. She appears well-developed and well-nourished. No distress.   HENT:   Head: Normocephalic and atraumatic.   Right Ear: External ear normal.   Left Ear: External ear normal.   Nose: Nose normal.   Mouth/Throat: Oropharynx is clear and moist. No oropharyngeal exudate.   Eyes: Pupils are equal, round, and reactive to light. Conjunctivae and EOM are normal. Right eye exhibits no discharge. Left eye exhibits no discharge.   Neck: Normal range of motion. Neck supple. No thyromegaly present.   Cardiovascular: Normal rate, regular rhythm, normal heart sounds and intact distal pulses.   No murmur heard.  Pulmonary/Chest: Effort normal and breath sounds normal. No respiratory distress. She has no wheezes.   Abdominal: Soft. Bowel sounds are normal. She exhibits no distension and no mass. There is no tenderness.   Musculoskeletal: Normal range of motion. She exhibits no edema.   Lymphadenopathy:     She has no cervical adenopathy.   Neurological: She is alert and oriented to person, place, and time. No cranial nerve deficit.   Skin: Capillary refill takes less than 2 seconds. She is not diaphoretic.   Psychiatric: She has a normal mood and affect. Her behavior is normal. Judgment and thought content normal.   Nursing note and vitals reviewed.      Laboratory Reviewed ({N/A)  Lab Results   Component Value Date    WBC 6.16 05/27/2019    HGB 9.9 (L) 05/27/2019    HCT 30.7 (L) 05/27/2019     (L) 05/27/2019    CHOL 171 02/09/2018    TRIG 40 02/09/2018    HDL 66 02/09/2018    ALT 20 05/27/2019    AST 21 05/27/2019     05/27/2019    K 3.8  05/27/2019     05/27/2019    CREATININE 0.9 05/27/2019    BUN 14 05/27/2019    CO2 25 05/27/2019    TSH 1.869 04/19/2013    INR 1.0 01/28/2019       Casie was seen today for pre-op exam.    Diagnoses and all orders for this visit:    Pre-operative clearance  -     Cancel: CBC auto differential; Future  -     Comprehensive metabolic panel; Future      Malignant neoplasm of ovary, unspecified laterality   5/6 chemo completed   Dr. Juarez appt needed after end of 6/6   Follow up with Dr. Mckeon    History of renal stent   Cleared for stent replacement, cystoscopy.    Plan for Pneumovax 23 once chemo completed          Care Plan/Goals: Reviewed  (Yes)  Goals     None          Follow up: No follow-ups on file.    After visit summary was printed and given to patient upon discharge today.  Patient goals and care plan are included in After Visit Summary.

## 2019-06-13 ENCOUNTER — TELEPHONE (OUTPATIENT)
Dept: DERMATOLOGY | Facility: CLINIC | Age: 64
End: 2019-06-13

## 2019-06-13 NOTE — TELEPHONE ENCOUNTER
----- Message from Cara Domingo MD sent at 6/13/2019  7:59 AM CDT -----  Can schedule with Shweta if Dr. Kearns has no appts/cancellations today  ----- Message -----  From: Bianca Dalton LPN  Sent: 6/12/2019  11:00 AM  To: Cara Domingo MD    Please advise   ----- Message -----  From: Bethany Miller LPN  Sent: 6/12/2019  10:40 AM  To: Bianca Dalton LPN    Hey Dr. Domingo are you okay with Shweta seeing this pt. She is a cancer pt with a rash.  ----- Message -----  From: Bianca Dalton LPN  Sent: 6/12/2019   9:24 AM  To: Bethany Miller LPN    They want Dr. Kearns or Dr. Domingo . We have no openings.   ----- Message -----  From: Nilsa Kebede  Sent: 6/12/2019   8:10 AM  To: [facility] Staff    Type:  Same Day Appointment Request    Caller is requesting a same day appointment.  Caller declined first available appointment listed below.    Name of Caller: Pt  Casie  When is the first available appointment?    Symptoms:  Itchy rash/welts//Pt has cancer  Best Call Back Number:   [phone]  Additional Information:  Pt has Cancer///States she would like to have an appt today with Dr Kearns or Dr Domingo//she is having a procedure done tomorrow at the hospital and does now want this to interfer//please call asap//thanks/lh

## 2019-06-17 ENCOUNTER — OFFICE VISIT (OUTPATIENT)
Dept: HEMATOLOGY/ONCOLOGY | Facility: CLINIC | Age: 64
End: 2019-06-17
Payer: COMMERCIAL

## 2019-06-17 ENCOUNTER — LAB VISIT (OUTPATIENT)
Dept: LAB | Facility: HOSPITAL | Age: 64
End: 2019-06-17
Attending: INTERNAL MEDICINE
Payer: COMMERCIAL

## 2019-06-17 VITALS
SYSTOLIC BLOOD PRESSURE: 155 MMHG | HEART RATE: 77 BPM | OXYGEN SATURATION: 99 % | HEIGHT: 67 IN | DIASTOLIC BLOOD PRESSURE: 91 MMHG | BODY MASS INDEX: 34.84 KG/M2 | WEIGHT: 222 LBS | RESPIRATION RATE: 18 BRPM | TEMPERATURE: 98 F

## 2019-06-17 DIAGNOSIS — C56.9 MALIGNANT NEOPLASM OF OVARY, UNSPECIFIED LATERALITY: Primary | ICD-10-CM

## 2019-06-17 DIAGNOSIS — C56.1 MALIGNANT NEOPLASM OF RIGHT OVARY: ICD-10-CM

## 2019-06-17 DIAGNOSIS — C56.9 MALIGNANT NEOPLASM OF OVARY, UNSPECIFIED LATERALITY: ICD-10-CM

## 2019-06-17 LAB
ALBUMIN SERPL BCP-MCNC: 3.5 G/DL (ref 3.5–5.2)
ALP SERPL-CCNC: 104 U/L (ref 55–135)
ALT SERPL W/O P-5'-P-CCNC: 14 U/L (ref 10–44)
ANION GAP SERPL CALC-SCNC: 9 MMOL/L (ref 8–16)
AST SERPL-CCNC: 18 U/L (ref 10–40)
BASOPHILS # BLD AUTO: 0.02 K/UL (ref 0–0.2)
BASOPHILS NFR BLD: 0.4 % (ref 0–1.9)
BILIRUB SERPL-MCNC: 0.6 MG/DL (ref 0.1–1)
BUN SERPL-MCNC: 14 MG/DL (ref 8–23)
CALCIUM SERPL-MCNC: 10.1 MG/DL (ref 8.7–10.5)
CANCER AG125 SERPL-ACNC: 9 U/ML (ref 0–30)
CHLORIDE SERPL-SCNC: 107 MMOL/L (ref 95–110)
CO2 SERPL-SCNC: 28 MMOL/L (ref 23–29)
CREAT SERPL-MCNC: 0.8 MG/DL (ref 0.5–1.4)
DIFFERENTIAL METHOD: ABNORMAL
EOSINOPHIL # BLD AUTO: 0 K/UL (ref 0–0.5)
EOSINOPHIL NFR BLD: 0.2 % (ref 0–8)
ERYTHROCYTE [DISTWIDTH] IN BLOOD BY AUTOMATED COUNT: 21 % (ref 11.5–14.5)
EST. GFR  (AFRICAN AMERICAN): >60 ML/MIN/1.73 M^2
EST. GFR  (NON AFRICAN AMERICAN): >60 ML/MIN/1.73 M^2
GLUCOSE SERPL-MCNC: 100 MG/DL (ref 70–110)
HCT VFR BLD AUTO: 29.3 % (ref 37–48.5)
HGB BLD-MCNC: 9.1 G/DL (ref 12–16)
IMM GRANULOCYTES # BLD AUTO: 0.02 K/UL (ref 0–0.04)
IMM GRANULOCYTES NFR BLD AUTO: 0.4 % (ref 0–0.5)
LYMPHOCYTES # BLD AUTO: 1.8 K/UL (ref 1–4.8)
LYMPHOCYTES NFR BLD: 35 % (ref 18–48)
MCH RBC QN AUTO: 29.3 PG (ref 27–31)
MCHC RBC AUTO-ENTMCNC: 31.1 G/DL (ref 32–36)
MCV RBC AUTO: 94 FL (ref 82–98)
MONOCYTES # BLD AUTO: 0.4 K/UL (ref 0.3–1)
MONOCYTES NFR BLD: 8 % (ref 4–15)
NEUTROPHILS # BLD AUTO: 2.9 K/UL (ref 1.8–7.7)
NEUTROPHILS NFR BLD: 56.4 % (ref 38–73)
NRBC BLD-RTO: 0 /100 WBC
PLATELET # BLD AUTO: 102 K/UL (ref 150–350)
PMV BLD AUTO: 9.9 FL (ref 9.2–12.9)
POTASSIUM SERPL-SCNC: 4.2 MMOL/L (ref 3.5–5.1)
PROT SERPL-MCNC: 6.9 G/DL (ref 6–8.4)
RBC # BLD AUTO: 3.11 M/UL (ref 4–5.4)
SODIUM SERPL-SCNC: 144 MMOL/L (ref 136–145)
WBC # BLD AUTO: 5.12 K/UL (ref 3.9–12.7)

## 2019-06-17 PROCEDURE — 99215 OFFICE O/P EST HI 40 MIN: CPT | Mod: 25,S$GLB,, | Performed by: INTERNAL MEDICINE

## 2019-06-17 PROCEDURE — 85025 COMPLETE CBC W/AUTO DIFF WBC: CPT

## 2019-06-17 PROCEDURE — 3077F PR MOST RECENT SYSTOLIC BLOOD PRESSURE >= 140 MM HG: ICD-10-PCS | Mod: CPTII,S$GLB,, | Performed by: INTERNAL MEDICINE

## 2019-06-17 PROCEDURE — 99999 PR PBB SHADOW E&M-EST. PATIENT-LVL IV: ICD-10-PCS | Mod: PBBFAC,,, | Performed by: INTERNAL MEDICINE

## 2019-06-17 PROCEDURE — 99215 PR OFFICE/OUTPT VISIT, EST, LEVL V, 40-54 MIN: ICD-10-PCS | Mod: 25,S$GLB,, | Performed by: INTERNAL MEDICINE

## 2019-06-17 PROCEDURE — 3077F SYST BP >= 140 MM HG: CPT | Mod: CPTII,S$GLB,, | Performed by: INTERNAL MEDICINE

## 2019-06-17 PROCEDURE — 99999 PR PBB SHADOW E&M-EST. PATIENT-LVL IV: CPT | Mod: PBBFAC,,, | Performed by: INTERNAL MEDICINE

## 2019-06-17 PROCEDURE — 80053 COMPREHEN METABOLIC PANEL: CPT

## 2019-06-17 PROCEDURE — 3008F BODY MASS INDEX DOCD: CPT | Mod: CPTII,S$GLB,, | Performed by: INTERNAL MEDICINE

## 2019-06-17 PROCEDURE — 86304 IMMUNOASSAY TUMOR CA 125: CPT

## 2019-06-17 PROCEDURE — 3080F DIAST BP >= 90 MM HG: CPT | Mod: CPTII,S$GLB,, | Performed by: INTERNAL MEDICINE

## 2019-06-17 PROCEDURE — 36415 COLL VENOUS BLD VENIPUNCTURE: CPT

## 2019-06-17 PROCEDURE — 3080F PR MOST RECENT DIASTOLIC BLOOD PRESSURE >= 90 MM HG: ICD-10-PCS | Mod: CPTII,S$GLB,, | Performed by: INTERNAL MEDICINE

## 2019-06-17 PROCEDURE — 3008F PR BODY MASS INDEX (BMI) DOCUMENTED: ICD-10-PCS | Mod: CPTII,S$GLB,, | Performed by: INTERNAL MEDICINE

## 2019-06-17 RX ORDER — EPINEPHRINE 0.3 MG/.3ML
0.3 INJECTION SUBCUTANEOUS ONCE AS NEEDED
Status: CANCELLED | OUTPATIENT
Start: 2019-06-18

## 2019-06-17 RX ORDER — FAMOTIDINE 10 MG/ML
20 INJECTION INTRAVENOUS
Status: CANCELLED | OUTPATIENT
Start: 2019-06-18

## 2019-06-17 RX ORDER — DIPHENHYDRAMINE HYDROCHLORIDE 50 MG/ML
50 INJECTION INTRAMUSCULAR; INTRAVENOUS ONCE AS NEEDED
Status: CANCELLED | OUTPATIENT
Start: 2019-06-18

## 2019-06-17 RX ORDER — HEPARIN 100 UNIT/ML
500 SYRINGE INTRAVENOUS
Status: CANCELLED | OUTPATIENT
Start: 2019-06-18

## 2019-06-17 RX ORDER — SODIUM CHLORIDE 0.9 % (FLUSH) 0.9 %
10 SYRINGE (ML) INJECTION
Status: CANCELLED | OUTPATIENT
Start: 2019-06-18

## 2019-06-17 NOTE — PROGRESS NOTES
Subjective:       Patient ID: Casie Gaines is a 63 y.o. female.    Chief Complaint: Malignant neoplasm of ovary, unspecified laterality [C56.9]  HPI: We have an opportunity to see Ms. Casie Gaines in Hematology Oncology clinic at [facility] on 06/17/2019.  Ms. Casie Gaines is a 63 y.o. woman with peritoneal carcinomatosis with malignant right pleural effusion.  Final pathology is consistent with ovarian primary with CA 2740.  PET scan demonstrates multiple omental and peritoneal avid masses consistent with peritoneal carcinomatosis, extensive retroperitoneal and mediastinal lymphadenopathy, masslike structure in the right iliac fossa likely representing ovarian mass.  Patient has been evaluated by GYN Oncology Dr. Juarez and MD Green with recommendations for him carboplatin/Taxol/Avastin.     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  S/p 5 cycle avastin taxol carboplatin.  Has fatigue.  Has partial response after 3 cycles.       Peritoneal carcinomatosis    1/26/2019 Initial Diagnosis     Peritoneal carcinomatosis         2/15/2019 -  Chemotherapy     Treatment Summary   Plan Name: OP GYN PACLITAXEL CARBOPLATIN (AUC 6) Q3W  Treatment Goal: Palliative  Status: Active  Start Date: 2/28/2019  End Date: 6/17/2019 (Planned)  Provider: Will Trevizo Jr., MD  Chemotherapy: bevacizumab (AVASTIN) 15 mg/kg = 1,600 mg in sodium chloride 0.9% 100 mL chemo infusion, 15 mg/kg = 1,600 mg (100 % of original dose 15 mg/kg), Intravenous, Clinic/HOD 1 time, 5 of 6 cycles  Dose modification: 15 mg/kg (original dose 15 mg/kg, Cycle 1)  Administration: 1,600 mg (2/28/2019), 1,600 mg (3/21/2019), 1,600 mg (4/11/2019), 1,600 mg (5/7/2019), 1,600 mg (5/28/2019)  CARBOplatin (PARAPLATIN) 870 mg in sodium chloride 0.9% 337 mL chemo infusion, 870 mg (100 % of original dose 868.8 mg), Intravenous, Clinic/HOD 1 time, 5 of 6 cycles  Dose modification:   (original dose 868.8 mg,  Cycle 1)  Administration: 870 mg (2/28/2019), 870 mg (3/21/2019), 900 mg (4/11/2019), 870 mg (5/7/2019), 660 mg (5/28/2019)  PACLitaxel (TAXOL) 175 mg/m2 = 396 mg in sodium chloride 0.9% 566 mL chemo infusion, 175 mg/m2 = 396 mg, Intravenous, Clinic/HOD 1 time, 5 of 6 cycles  Dose modification: 140 mg/m2 (80 % of original dose 175 mg/m2, Cycle 5)  Administration: 396 mg (2/28/2019), 396 mg (3/21/2019), 396 mg (4/11/2019), 396 mg (5/7/2019), 318 mg (5/28/2019)           Malignant neoplasm of right ovary    2/15/2019 Initial Diagnosis     Malignant neoplasm of right ovary         2/15/2019 -  Chemotherapy     Treatment Summary   Plan Name: OP GYN PACLITAXEL CARBOPLATIN (AUC 6) Q3W  Treatment Goal: Palliative  Status: Active  Start Date: 2/28/2019  End Date: 6/17/2019 (Planned)  Provider: Will Trevizo Jr., MD  Chemotherapy: bevacizumab (AVASTIN) 15 mg/kg = 1,600 mg in sodium chloride 0.9% 100 mL chemo infusion, 15 mg/kg = 1,600 mg (100 % of original dose 15 mg/kg), Intravenous, Clinic/HOD 1 time, 5 of 6 cycles  Dose modification: 15 mg/kg (original dose 15 mg/kg, Cycle 1)  Administration: 1,600 mg (2/28/2019), 1,600 mg (3/21/2019), 1,600 mg (4/11/2019), 1,600 mg (5/7/2019), 1,600 mg (5/28/2019)  CARBOplatin (PARAPLATIN) 870 mg in sodium chloride 0.9% 337 mL chemo infusion, 870 mg (100 % of original dose 868.8 mg), Intravenous, Clinic/HOD 1 time, 5 of 6 cycles  Dose modification:   (original dose 868.8 mg, Cycle 1)  Administration: 870 mg (2/28/2019), 870 mg (3/21/2019), 900 mg (4/11/2019), 870 mg (5/7/2019), 660 mg (5/28/2019)  PACLitaxel (TAXOL) 175 mg/m2 = 396 mg in sodium chloride 0.9% 566 mL chemo infusion, 175 mg/m2 = 396 mg, Intravenous, Clinic/[facility] 1 time, 5 of 6 cycles  Dose modification: 140 mg/m2 (80 % of original dose 175 mg/m2, Cycle 5)  Administration: 396 mg (2/28/2019), 396 mg (3/21/2019), 396 mg (4/11/2019), 396 mg (5/7/2019), 318 mg (5/28/2019)          Past Medical History:   Diagnosis Date     Anemia     Anxiety     Arthritis     knees    Cancer     ovarian    CHF (congestive heart failure)     Hyperlipemia     Hypertension     Neck pain      Family History   Problem Relation Age of Onset    Anesthesia problems Other     Breast cancer Maternal Aunt     Breast cancer Paternal Aunt     Ovarian cancer Paternal Aunt     Colon cancer Brother     Thrombophilia Neg Hx      Social History     Socioeconomic History    Marital status:      Spouse name: Not on file    Number of children: Not on file    Years of education: Not on file    Highest education level: Not on file   Occupational History    Not on file   Social Needs    Financial resource strain: Not on file    Food insecurity:     Worry: Not on file     Inability: Not on file    Transportation needs:     Medical: Not on file     Non-medical: Not on file   Tobacco Use    Smoking status: Former Smoker     Packs/day: 1.00     Years: 25.00     Pack years: 25.00     Last attempt to quit: 10/1/2018     Years since quittin.7    Smokeless tobacco: Never Used    Tobacco comment: States started quit 2 months ago after 30 years   Substance and Sexual Activity    Alcohol use: Never     Alcohol/week: 0.0 oz     Frequency: Never     Comment: occasionally  No alcohol 72h prior to sx    Drug use: No    Sexual activity: Not Currently     Partners: Male     Comment: hyst; mut monog   Lifestyle    Physical activity:     Days per week: Not on file     Minutes per session: Not on file    Stress: Not on file   Relationships    Social connections:     Talks on phone: Not on file     Gets together: Not on file     Attends Latter-day service: Not on file     Active member of club or organization: Not on file     Attends meetings of clubs or organizations: Not on file     Relationship status: Not on file   Other Topics Concern    Not on file   Social History Narrative    Not on file     Past Surgical History:   Procedure Laterality Date     breast reduction  10/02/2018     SECTION      X 1    CYSTOSCOPY, WITH URETERAL STENT INSERTION Right 2019    Per[facility] by [facility]my Santiago [facility] at [facility] OR    DILATION AND CURETTAGE OF UTERUS      HYSTERECTOMY      RALH for fibroids (still has ovaries)    INSERTION, VENOUS ACCESS PORT Left 2019    Performed by Ulisses Monzon MD at [facility] OR    PLACEMENT, TRANSOBTURATOR TAPE N/A 2014    Performed by Sayra Aguilar MD at [facility] OR    PYELOGRAM, RETROGRADE Right 2019    Performed by Timmy Santiago IV[facility] at [facility] OR    ROBOTIC HYSTERECTOMY N/A 2014    Performed by [facility]ilar MD at [facility] OR    TUBAL LIGATION       Current Outpatient Medications   Medication Sig Dispense Refill    albuterol 90 mcg/actuation inhaler Inhale 2 puffs into the lungs every 4 (four) hours as needed for Wheezing. Rescue 18 g 0    biotin 1 mg tablet Take 1,000 mcg by mouth 3 (three) times daily.      cefadroxil (DURICEF) 500 MG Cap 1  capsule  two times a day 30 capsule 0    dexamethasone (DECADRON) 4 MG Tab Decadron 20 mg (5 tablets) by mouth 12 and 6 hr prior to chemotherapy 20 tablet 3    HYDROcodone-acetaminophen (NORCO) 5-325 mg per tablet Take 1 tablet by mouth every 6 (six) hours as needed for Pain. 10 tablet 0    methen-sod phos-meth blue-hyos (UROGESIC-BLUE) 81.6-40.8-0.12 mg Tab Take 1 tablet by mouth 4 (four) times daily.      methen-sod phos-meth blue-hyos (UROGESIC-BLUE) 81.6-40.8-0.12 mg Tab Take 1 tablet by mouth four times daily for 10 days 40 tablet 4    multivitamin with minerals tablet Take 1 tablet by mouth once daily.       olmesartan-hydrochlorothiazide (BENICAR HCT) 40-12.5 mg Tab Take 1 tablet by mouth once daily. 90 tablet 0    ondansetron (ZOFRAN-ODT) 8 MG TbDL Take 1 tablet (8 mg total) by mouth every 8 (eight) hours as needed. 30 tablet 5    oxyCODONE-acetaminophen (PERCOCET)  mg per tablet Take 1 tablet by mouth every 8 (eight) hours as  needed. 20 tablet 0    prochlorperazine (COMPAZINE) 10 MG tablet Take 1 tablet (10 mg total) by mouth every 6 (six) hours as needed. 30 tablet 5    rosuvastatin (CRESTOR) 10 MG tablet Take 1 tablet (10 mg total) by mouth once daily. 90 tablet 1    zinc gluconate 50 mg tablet Take 50 mg by mouth once daily.      lidocaine-prilocaine (EMLA) cream Apply topically as needed. 30 g 1    tolterodine (DETROL) 2 MG Tab Take 1 tablet (2 mg total) by mouth 2 (two) times daily. 60 tablet 11     No current facility-administered medications for this visit.        Labs:  Lab Results   Component Value Date    WBC 5.12 06/17/2019    HGB 9.1 (L) 06/17/2019    HCT 29.3 (L) 06/17/2019    MCV 94 06/17/2019     (L) 06/17/2019     BMP  Lab Results   Component Value Date     06/17/2019    K 4.2 06/17/2019     06/17/2019    CO2 28 06/17/2019    BUN 14 06/17/2019    CREATININE 0.8 06/17/2019    CALCIUM 10.1 06/17/2019    ANIONGAP 9 06/17/2019    ESTGFRAFRICA >60 06/17/2019    EGFRNONAA >60 06/17/2019     Lab Results   Component Value Date    ALT 14 06/17/2019    AST 18 06/17/2019    ALKPHOS 104 06/17/2019    BILITOT 0.6 06/17/2019       No results found for: IRON, TIBC, FERRITIN, SATURATEDIRO  No results found for: [id number]  No results found for: FOLATE  Lab Results   Component Value Date    TSH 1.869 04/19/2013       I have reviewed the radiology reports and examined the scan/xray images.    Review of Systems   Constitutional: Negative.    HENT: Negative.    Eyes: Negative.    Respiratory: Negative.    Cardiovascular: Negative.    Gastrointestinal: Negative.    Endocrine: Negative.    Genitourinary: Negative.    Musculoskeletal: Negative.    Skin: Negative.    Allergic/Immunologic: Negative.    Neurological: Negative.    Hematological: Negative.    Psychiatric/Behavioral: Negative.      ECOG SCORE              Objective:     Vitals:    06/17/19 1012   BP: (!) 155/91   Pulse: 77   Resp: 18   Temp: 98 °F (36.7 °C)    Body mass index is 34.77 kg/m².  Physical Exam   Constitutional: She is oriented to person, place, and time. She appears well-developed and well-nourished.   HENT:   Head: Normocephalic and atraumatic.   Eyes: Conjunctivae and EOM are normal.   Neck: Normal range of motion. Neck supple.   Cardiovascular: Normal rate and regular rhythm.   Pulmonary/Chest: Effort normal and breath sounds normal.   Abdominal: Soft. Bowel sounds are normal.   Musculoskeletal: Normal range of motion.   Neurological: She is alert and oriented to person, place, and time.   Skin: Skin is warm and dry.   Psychiatric: She has a normal mood and affect. Her behavior is normal. Judgment and thought content normal.   Nursing note and vitals reviewed.        Assessment:      1. Malignant neoplasm of ovary, unspecified laterality           Plan:     Malignant neoplasm of ovary, unspecified laterality    Will give cycle 6 chemo tomorrow.  Get restaging CT in 2 weeks.  See Dr. Juarez in Gynecologic Oncology for plan in 3 weeks, ? Surgery or more chemotherapy.    Appointment to see Dr. Lindsay in Urology for management of ureteral stent.    -     Ambulatory consult to Urology  -     Ambulatory consult to Gynecology  -     CT Chest Abdoment Pelvis With Contrast; Future; Expected date: 07/01/2019

## 2019-06-18 ENCOUNTER — INFUSION (OUTPATIENT)
Dept: INFUSION THERAPY | Facility: HOSPITAL | Age: 64
End: 2019-06-18
Attending: INTERNAL MEDICINE
Payer: COMMERCIAL

## 2019-06-18 VITALS
OXYGEN SATURATION: 98 % | TEMPERATURE: 97 F | WEIGHT: 222 LBS | HEIGHT: 67 IN | HEART RATE: 84 BPM | RESPIRATION RATE: 18 BRPM | BODY MASS INDEX: 34.84 KG/M2 | SYSTOLIC BLOOD PRESSURE: 153 MMHG | DIASTOLIC BLOOD PRESSURE: 85 MMHG

## 2019-06-18 DIAGNOSIS — C78.6 PERITONEAL CARCINOMATOSIS: ICD-10-CM

## 2019-06-18 DIAGNOSIS — C56.1 MALIGNANT NEOPLASM OF RIGHT OVARY: Primary | ICD-10-CM

## 2019-06-18 PROCEDURE — 63600175 PHARM REV CODE 636 W HCPCS: Mod: JG | Performed by: INTERNAL MEDICINE

## 2019-06-18 PROCEDURE — 96367 TX/PROPH/DG ADDL SEQ IV INF: CPT

## 2019-06-18 PROCEDURE — 96375 TX/PRO/DX INJ NEW DRUG ADDON: CPT

## 2019-06-18 PROCEDURE — 25000003 PHARM REV CODE 250: Performed by: INTERNAL MEDICINE

## 2019-06-18 PROCEDURE — 96417 CHEMO IV INFUS EACH ADDL SEQ: CPT

## 2019-06-18 PROCEDURE — S0028 INJECTION, FAMOTIDINE, 20 MG: HCPCS | Performed by: INTERNAL MEDICINE

## 2019-06-18 PROCEDURE — 96415 CHEMO IV INFUSION ADDL HR: CPT

## 2019-06-18 PROCEDURE — 96413 CHEMO IV INFUSION 1 HR: CPT

## 2019-06-18 RX ORDER — HEPARIN 100 UNIT/ML
500 SYRINGE INTRAVENOUS
Status: DISCONTINUED | OUTPATIENT
Start: 2019-06-18 | End: 2019-06-18 | Stop reason: HOSPADM

## 2019-06-18 RX ORDER — DIPHENHYDRAMINE HYDROCHLORIDE 50 MG/ML
50 INJECTION INTRAMUSCULAR; INTRAVENOUS ONCE AS NEEDED
Status: DISCONTINUED | OUTPATIENT
Start: 2019-06-18 | End: 2019-06-18 | Stop reason: HOSPADM

## 2019-06-18 RX ORDER — FAMOTIDINE 10 MG/ML
20 INJECTION INTRAVENOUS
Status: COMPLETED | OUTPATIENT
Start: 2019-06-18 | End: 2019-06-18

## 2019-06-18 RX ADMIN — DIPHENHYDRAMINE HYDROCHLORIDE 50 MG: 50 INJECTION, SOLUTION INTRAMUSCULAR; INTRAVENOUS at 10:06

## 2019-06-18 RX ADMIN — DEXAMETHASONE SODIUM PHOSPHATE: 4 INJECTION, SOLUTION INTRA-ARTICULAR; INTRALESIONAL; INTRAMUSCULAR; INTRAVENOUS; SOFT TISSUE at 10:06

## 2019-06-18 RX ADMIN — CARBOPLATIN 690 MG: 10 INJECTION, SOLUTION INTRAVENOUS at 03:06

## 2019-06-18 RX ADMIN — HEPARIN 500 UNITS: 100 SYRINGE at 03:06

## 2019-06-18 RX ADMIN — PACLITAXEL 306 MG: 6 INJECTION, SOLUTION INTRAVENOUS at 11:06

## 2019-06-18 RX ADMIN — BEVACIZUMAB 1510 MG: 400 INJECTION, SOLUTION INTRAVENOUS at 11:06

## 2019-06-18 RX ADMIN — FAMOTIDINE 20 MG: 10 INJECTION, SOLUTION INTRAVENOUS at 10:06

## 2019-06-18 NOTE — DISCHARGE INSTRUCTIONS
[facility]  [address] [facility]  [address]  [phone] phone     [phone] fax  Hours of Operation: Monday- Friday 8:00am- 5:00pm  After hours phone  [phone]  Hematology / Oncology Physicians on call      Dr. Varinder Mckeon      Please call with any concerns regarding your appointment today.    HOME CARE AFTER CHEMOTHERAPY   Meals   Many patients feel sick and lose their appetites during treatment. Eat small meals several times a day. Choose bland foods with little taste or smell if you have problems with nausea. Be sure to cook all food thoroughly. This kills bacteria and helps you avoid intestinal infection. Soft foods are easier to swallow and digest.   Activity   Exercise keeps you strong and keeps your heart and lungs active. Talk to your doctor about an appropriate exercise program for you.   Skin Care   To prevent a skin infection, bathe or shower once a day. Use a moisturizing soap and wash with warm water. Avoid very hot or cold water. Chemotherapy can make your skin dry . Apply moisturizing lotion to help relieve dry skin. Some drugs used in high doses can cause slight burns to appear (like sunburn). Ask for a special cream to help relieve the burn and protect your skin.   Prevent Mouth Sores   During chemotherapy, many people get mouth sores. Do the following to help prevent mouth sores or to ease discomfort.   Brush your teeth with a soft-bristle toothbrush after every meal.  Don't use dental floss if your platelet count is below 50,000. Your doctor or nurse will tell you if this is the case.  Use an oral swab or special soft toothbrush if your gums bleed during regular brushing.  Use mouthwash as directed. If you can't tolerate commercial mouthwash, use salt and baking soda to clean your mouth. Mix 1 teaspoon of salt and 1 teaspoon of baking soda into a glass of water. Swish and spit.  Call your  doctor or return to this facility if you develop any of the following:   Sore throat   White patches in the mouth or throat   Fever of 100.4ºF (38ºC) or higher, or as directed by your healthcare provider  © 2000-2011 [facility], [address] [place] [id number]. All rights reserved. This information is not intended as a substitute for professional medical care. Always follow your healthcare professional's   FALL PREVENTION   Falls often occur due to slipping, tripping or losing your balance. Here are ways to reduce your risk of falling again.   Was there anything that caused your fall that can be fixed, removed or replaced?   Make your home safe by keeping walkways clear of objects you may trip over.   Use non-slip pads under rugs.   Do not walk in poorly lit areas.   Do not stand on chairs or wobbly ladders.   Use caution when reaching overhead or looking upward. This position can cause a loss of balance.   Be sure your shoes fit properly, have non-slip bottoms and are in good condition.   Be cautious when going up and down stairs, curbs, and when walking on uneven sidewalks.   If your balance is poor, consider using a cane or walker.   If your fall was related to alcohol use, stop or limit alcohol intake.   If your fall was related to use of sleeping medicines, talk to your doctor about this. You may need to reduce your dosage at bedtime if you awaken during the night to go to the bathroom.   To reduce the need for nighttime bathroom trips:   Avoid drinking fluids for several hours before going to bed   Empty your bladder before going to bed   Men can keep a urinal at the bedside   © 2000-2011 [facility], [address] [place] [id number]. All rights reserved. This information is not intended as a substitute for professional medical care. Always follow your healthcare professional's instructions.  WAYS TO HELP PREVENT INFECTION         WASH YOUR HANDS OFTEN DURING THE DAY, ESPECIALLY  BEFORE YOU EAT, AFTER USING THE BATHROOM, AND AFTER TOUCHING ANIMALS     STAY AWAY FROM PEOPLE WHO HAVE ILLNESSES YOU CAN CATCH; SUCH AS COLDS, FLU, CHICKEN POX     TRY TO AVOID CROWDS     STAY AWAY FROM CHILDREN WHO RECENTLY HAVE RECEIVED LIVE VIRUS VACCINES     MAINTAIN GOOD MOUTH CARE     DO NOT SQUEEZE OR SCRATCH PIMPLES     CLEAN CUTS & SCRAPES RIGHT AWAY AND DAILY UNTIL HEALED WITH WARM WATER, SOAP & AN ANTISEPTIC     AVOID CONTACT WITH LITTER BOXES, BIRD CAGES, & FISH TANKS     AVOID STANDING WATER, IE., BIRD BATHS, FLOWER POTS/VASES, OR HUMIDIFIERS     WEAR GLOVES WHEN GARDENING OR CLEANING UP AFTER OTHERS, ESPECIALLY BABIES & SMALL CHILDREN     DO NOT EAT RAW FISH, SEAFOOD, MEAT, OR EGGS

## 2019-06-18 NOTE — PLAN OF CARE
Problem: Adult Inpatient Plan of Care  Goal: Plan of Care Review  Outcome: Ongoing (interventions implemented as appropriate)  Pt states feels ok just forgot to take her steroid last night and this am

## 2019-06-18 NOTE — NURSING
Pt states she forgot to take her premed decadron at home last night and this am.  Reported to dr james and he orders to proceed with treatment as ordered.  Also protein in urine discussed with dr james and we will proceed with avastin as ordered as well. Per dr james.

## 2019-06-28 ENCOUNTER — TELEPHONE (OUTPATIENT)
Dept: RADIOLOGY | Facility: HOSPITAL | Age: 64
End: 2019-06-28

## 2019-07-01 ENCOUNTER — HOSPITAL ENCOUNTER (OUTPATIENT)
Dept: RADIOLOGY | Facility: HOSPITAL | Age: 64
Discharge: HOME | End: 2019-07-01
Attending: INTERNAL MEDICINE
Payer: COMMERCIAL

## 2019-07-01 DIAGNOSIS — C56.9 MALIGNANT NEOPLASM OF OVARY, UNSPECIFIED LATERALITY: ICD-10-CM

## 2019-07-01 PROCEDURE — 74177 CT ABD & PELVIS W/CONTRAST: CPT | Mod: 26,,, | Performed by: RADIOLOGY

## 2019-07-01 PROCEDURE — 25500020 PHARM REV CODE 255: Performed by: INTERNAL MEDICINE

## 2019-07-01 PROCEDURE — 74177 CT ABD & PELVIS W/CONTRAST: CPT | Mod: TC

## 2019-07-01 PROCEDURE — 74177 CT CHEST ABDOMEN PELVIS WITH CONTRAST (XPD): ICD-10-PCS | Mod: 26,,, | Performed by: RADIOLOGY

## 2019-07-01 PROCEDURE — 71260 CT THORAX DX C+: CPT | Mod: 26,,, | Performed by: RADIOLOGY

## 2019-07-01 PROCEDURE — 71260 CT CHEST ABDOMEN PELVIS WITH CONTRAST (XPD): ICD-10-PCS | Mod: 26,,, | Performed by: RADIOLOGY

## 2019-07-01 RX ADMIN — IOHEXOL 100 ML: 350 INJECTION, SOLUTION INTRAVENOUS at 11:07

## 2019-07-01 RX ADMIN — IOHEXOL 30 ML: 350 INJECTION, SOLUTION INTRAVENOUS at 09:07

## 2019-07-03 ENCOUNTER — OFFICE VISIT (OUTPATIENT)
Dept: GYNECOLOGIC ONCOLOGY | Facility: CLINIC | Age: 64
End: 2019-07-03
Payer: COMMERCIAL

## 2019-07-03 VITALS
DIASTOLIC BLOOD PRESSURE: 76 MMHG | HEART RATE: 92 BPM | HEIGHT: 67 IN | WEIGHT: 225.31 LBS | BODY MASS INDEX: 35.36 KG/M2 | SYSTOLIC BLOOD PRESSURE: 145 MMHG

## 2019-07-03 DIAGNOSIS — C56.9 MALIGNANT NEOPLASM OF OVARY, UNSPECIFIED LATERALITY: Primary | ICD-10-CM

## 2019-07-03 DIAGNOSIS — E66.01 MORBID OBESITY: ICD-10-CM

## 2019-07-03 DIAGNOSIS — C78.6 PERITONEAL CARCINOMATOSIS: ICD-10-CM

## 2019-07-03 PROCEDURE — 3077F SYST BP >= 140 MM HG: CPT | Mod: CPTII,S$GLB,, | Performed by: OBSTETRICS & GYNECOLOGY

## 2019-07-03 PROCEDURE — 99999 PR PBB SHADOW E&M-EST. PATIENT-LVL III: ICD-10-PCS | Mod: PBBFAC,,, | Performed by: OBSTETRICS & GYNECOLOGY

## 2019-07-03 PROCEDURE — 3008F PR BODY MASS INDEX (BMI) DOCUMENTED: ICD-10-PCS | Mod: CPTII,S$GLB,, | Performed by: OBSTETRICS & GYNECOLOGY

## 2019-07-03 PROCEDURE — 3008F BODY MASS INDEX DOCD: CPT | Mod: CPTII,S$GLB,, | Performed by: OBSTETRICS & GYNECOLOGY

## 2019-07-03 PROCEDURE — 99215 PR OFFICE/OUTPT VISIT, EST, LEVL V, 40-54 MIN: ICD-10-PCS | Mod: S$GLB,,, | Performed by: OBSTETRICS & GYNECOLOGY

## 2019-07-03 PROCEDURE — 3078F PR MOST RECENT DIASTOLIC BLOOD PRESSURE < 80 MM HG: ICD-10-PCS | Mod: CPTII,S$GLB,, | Performed by: OBSTETRICS & GYNECOLOGY

## 2019-07-03 PROCEDURE — 3077F PR MOST RECENT SYSTOLIC BLOOD PRESSURE >= 140 MM HG: ICD-10-PCS | Mod: CPTII,S$GLB,, | Performed by: OBSTETRICS & GYNECOLOGY

## 2019-07-03 PROCEDURE — 99215 OFFICE O/P EST HI 40 MIN: CPT | Mod: S$GLB,,, | Performed by: OBSTETRICS & GYNECOLOGY

## 2019-07-03 PROCEDURE — 99999 PR PBB SHADOW E&M-EST. PATIENT-LVL III: CPT | Mod: PBBFAC,,, | Performed by: OBSTETRICS & GYNECOLOGY

## 2019-07-03 PROCEDURE — 3078F DIAST BP <80 MM HG: CPT | Mod: CPTII,S$GLB,, | Performed by: OBSTETRICS & GYNECOLOGY

## 2019-07-03 NOTE — PROGRESS NOTES
Subjective:      Patient ID: Casie Gaines is a 63 y.o. female.    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  CA-125 is down to 9 prior to last treatment.  CT done on 7/1 shows a 2.8 cm right adnexal mass and soft tissue thickening around the ureter, stent in place.  Also has a small bowel containing umbilical hernia.  No other carcinomatosis or ascites seen.  Genetics as above.  Feels great.  No new symptoms.  Review of Systems   Constitutional: Negative for activity change, appetite change, chills, fatigue and fever.   HENT: Negative for hearing loss, mouth sores, nosebleeds, sore throat and tinnitus.    Eyes: Negative for visual disturbance.   Respiratory: Negative for cough, chest tightness, shortness of breath and wheezing.    Cardiovascular: Negative for chest pain and leg swelling.   Gastrointestinal: Negative for abdominal distention, abdominal pain, blood in stool, constipation, diarrhea, nausea and vomiting.   Genitourinary: Negative for dysuria, flank pain, frequency, hematuria, pelvic pain, vaginal bleeding, vaginal discharge and vaginal pain.   Musculoskeletal: Negative for arthralgias and back pain.   Skin: Negative for rash.   Neurological: Negative for dizziness, seizures, syncope, weakness and numbness.   Hematological: Does not bruise/bleed easily.   Psychiatric/Behavioral: Negative for confusion and sleep disturbance. The patient is not nervous/anxious.        Objective:   Physical Exam:   Constitutional: She appears well-developed and well-nourished. No distress.    HENT:   Head: Normocephalic and atraumatic.    Eyes: No scleral icterus.    Neck: Normal range of motion. Neck supple.    Cardiovascular: Normal rate and intact distal pulses.  Exam  reveals no cyanosis and no edema.     Pulmonary/Chest: Effort normal. No respiratory distress. She exhibits no tenderness.        Abdominal: Soft. She exhibits no distension, no fluid wave, no ascites and no mass. There is no tenderness. There is no rebound and no guarding. No hernia.         Genitourinary: Rectum normal and vagina normal. Pelvic exam was performed with patient supine. There is no rash, tenderness or lesion on the right labia. There is no rash, tenderness or lesion on the left labia. Uterus is absent. There is an absent adnexa. Right adnexum displays no mass, no tenderness and no fullness. Left adnexum displays no mass, no tenderness and no fullness. No bleeding or unspecified prolapse of vaginal walls in the vagina. No vaginal discharge found. Vaginal cuff normal.Labial bartholins normal.Cervix exhibits absence.              Lymphadenopathy:     She has no cervical adenopathy.        Right: No inguinal adenopathy present.        Left: No inguinal adenopathy present.     Skin: No cyanosis.        Assessment:     1. Malignant neoplasm of ovary, unspecified laterality    2. Morbid obesity    3. Peritoneal carcinomatosis        Plan:       Has had an excellent response to treatment. Will arrange surgery in [facility] in 4 weeks or so.  Plan to do there in case needs a laparotomy to address the right pelvic mass.  She voiced understanding with the plan.  Will call to arrange.

## 2019-07-05 ENCOUNTER — OFFICE VISIT (OUTPATIENT)
Dept: HEMATOLOGY/ONCOLOGY | Facility: CLINIC | Age: 64
End: 2019-07-05
Payer: COMMERCIAL

## 2019-07-05 VITALS
BODY MASS INDEX: 35.36 KG/M2 | DIASTOLIC BLOOD PRESSURE: 90 MMHG | OXYGEN SATURATION: 99 % | TEMPERATURE: 98 F | HEART RATE: 82 BPM | SYSTOLIC BLOOD PRESSURE: 150 MMHG | WEIGHT: 225.31 LBS | HEIGHT: 67 IN | RESPIRATION RATE: 16 BRPM

## 2019-07-05 DIAGNOSIS — C78.6 PERITONEAL CARCINOMATOSIS: ICD-10-CM

## 2019-07-05 DIAGNOSIS — C56.1 MALIGNANT NEOPLASM OF RIGHT OVARY: Primary | ICD-10-CM

## 2019-07-05 PROCEDURE — 3008F BODY MASS INDEX DOCD: CPT | Mod: CPTII,S$GLB,, | Performed by: INTERNAL MEDICINE

## 2019-07-05 PROCEDURE — 3080F PR MOST RECENT DIASTOLIC BLOOD PRESSURE >= 90 MM HG: ICD-10-PCS | Mod: CPTII,S$GLB,, | Performed by: INTERNAL MEDICINE

## 2019-07-05 PROCEDURE — 99999 PR PBB SHADOW E&M-EST. PATIENT-LVL IV: ICD-10-PCS | Mod: PBBFAC,,, | Performed by: INTERNAL MEDICINE

## 2019-07-05 PROCEDURE — 99215 PR OFFICE/OUTPT VISIT, EST, LEVL V, 40-54 MIN: ICD-10-PCS | Mod: S$GLB,,, | Performed by: INTERNAL MEDICINE

## 2019-07-05 PROCEDURE — 3080F DIAST BP >= 90 MM HG: CPT | Mod: CPTII,S$GLB,, | Performed by: INTERNAL MEDICINE

## 2019-07-05 PROCEDURE — 99999 PR PBB SHADOW E&M-EST. PATIENT-LVL IV: CPT | Mod: PBBFAC,,, | Performed by: INTERNAL MEDICINE

## 2019-07-05 PROCEDURE — 99215 OFFICE O/P EST HI 40 MIN: CPT | Mod: S$GLB,,, | Performed by: INTERNAL MEDICINE

## 2019-07-05 PROCEDURE — 3077F SYST BP >= 140 MM HG: CPT | Mod: CPTII,S$GLB,, | Performed by: INTERNAL MEDICINE

## 2019-07-05 PROCEDURE — 3077F PR MOST RECENT SYSTOLIC BLOOD PRESSURE >= 140 MM HG: ICD-10-PCS | Mod: CPTII,S$GLB,, | Performed by: INTERNAL MEDICINE

## 2019-07-05 PROCEDURE — 3008F PR BODY MASS INDEX (BMI) DOCUMENTED: ICD-10-PCS | Mod: CPTII,S$GLB,, | Performed by: INTERNAL MEDICINE

## 2019-07-05 NOTE — PROGRESS NOTES
Subjective:       Patient ID: Casie Gaines is a 63 y.o. female.    Chief Complaint: Malignant neoplasm of right ovary [C56.1]  HPI: We have an opportunity to see Ms. Casie Gaines in Hematology Oncology clinic at [facility] on 07/05/2019.  Ms. Casie Gaines is a 63 y.o. woman with peritoneal carcinomatosis with malignant right pleural effusion.  Final pathology is consistent with ovarian primary with  2740.  PET scan demonstrates multiple omental and peritoneal avid masses consistent with peritoneal carcinomatosis, extensive retroperitoneal and mediastinal lymphadenopathy, masslike structure in the right iliac fossa likely representing ovarian mass.  Patient has been evaluated by GYN Oncology Dr. Juarez and MD Green with recommendations for him carboplatin/Taxol/Avastin.      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  S/p 6 cycle avastin taxol carboplatin.  Has great OH after 6 cycles.          Peritoneal carcinomatosis    1/26/2019 Initial Diagnosis     Peritoneal carcinomatosis         2/15/2019 -  Chemotherapy     Treatment Summary   Plan Name: OP GYN PACLITAXEL CARBOPLATIN (AUC 6) Q3W  Treatment Goal: Palliative  Status: Active  Start Date: 2/28/2019  End Date: 6/18/2019  Provider: Will Trevizo Jr., MD  Chemotherapy: bevacizumab (AVASTIN) 15 mg/kg = 1,600 mg in sodium chloride 0.9% 100 mL chemo infusion, 15 mg/kg = 1,600 mg (100 % of original dose 15 mg/kg), Intravenous, Clinic/HOD 1 time, 6 of 6 cycles  Dose modification: 15 mg/kg (original dose 15 mg/kg, Cycle 1)  Administration: 1,600 mg (2/28/2019), 1,600 mg (3/21/2019), 1,600 mg (4/11/2019), 1,600 mg (5/7/2019), 1,600 mg (5/28/2019), 1,510 mg (6/18/2019)  CARBOplatin (PARAPLATIN) 870 mg in sodium chloride 0.9% 337 mL chemo infusion, 870 mg (100 % of original dose 868.8 mg), Intravenous, Clinic/HOD 1 time, 6 of 6 cycles  Dose modification:   (original dose 868.8 mg, Cycle  1)  Administration: 870 mg (2/28/2019), 870 mg (3/21/2019), 900 mg (4/11/2019), 870 mg (5/7/2019), 660 mg (5/28/2019), 690 mg (6/18/2019)  PACLitaxel (TAXOL) 175 mg/m2 = 396 mg in sodium chloride 0.9% 566 mL chemo infusion, 175 mg/m2 = 396 mg, Intravenous, Clinic/HOD 1 time, 6 of 6 cycles  Dose modification: 140 mg/m2 (80 % of original dose 175 mg/m2, Cycle 5)  Administration: 396 mg (2/28/2019), 396 mg (3/21/2019), 396 mg (4/11/2019), 396 mg (5/7/2019), 318 mg (5/28/2019), 306 mg (6/18/2019)           Malignant neoplasm of right ovary    2/15/2019 Initial Diagnosis     Malignant neoplasm of right ovary         2/15/2019 -  Chemotherapy     Treatment Summary   Plan Name: OP GYN PACLITAXEL CARBOPLATIN (AUC 6) Q3W  Treatment Goal: Palliative  Status: Active  Start Date: 2/28/2019  End Date: 6/18/2019  Provider: Will Trevizo Jr., MD  Chemotherapy: bevacizumab (AVASTIN) 15 mg/kg = 1,600 mg in sodium chloride 0.9% 100 mL chemo infusion, 15 mg/kg = 1,600 mg (100 % of original dose 15 mg/kg), Intravenous, Clinic/HOD 1 time, 6 of 6 cycles  Dose modification: 15 mg/kg (original dose 15 mg/kg, Cycle 1)  Administration: 1,600 mg (2/28/2019), 1,600 mg (3/21/2019), 1,600 mg (4/11/2019), 1,600 mg (5/7/2019), 1,600 mg (5/28/2019), 1,510 mg (6/18/2019)  CARBOplatin (PARAPLATIN) 870 mg in sodium chloride 0.9% 337 mL chemo infusion, 870 mg (100 % of original dose 868.8 mg), Intravenous, Clinic/HOD 1 time, 6 of 6 cycles  Dose modification:   (original dose 868.8 mg, Cycle 1)  Administration: 870 mg (2/28/2019), 870 mg (3/21/2019), 900 mg (4/11/2019), 870 mg (5/7/2019), 660 mg (5/28/2019), 690 mg (6/18/2019)  PACLitaxel (TAXOL) 175 mg/m2 = 396 mg in sodium chloride 0.9% 566 mL chemo infusion, 175 mg/m2 = 396 mg, Intravenous, Clinic/hospitals 1 time, 6 of 6 cycles  Dose modification: 140 mg/m2 (80 % of original dose 175 mg/m2, Cycle 5)  Administration: 396 mg (2/28/2019), 396 mg (3/21/2019), 396 mg (4/11/2019), 396 mg (5/7/2019), 318 mg  (2019), 306 mg (2019)          Past Medical History:   Diagnosis Date    Anemia     Anxiety     Arthritis     knees    Cancer     ovarian    CHF (congestive heart failure)     Hyperlipemia     Hypertension     Neck pain      Family History   Problem Relation Age of Onset    Anesthesia problems Other     Breast cancer Maternal Aunt     Breast cancer Paternal Aunt     Ovarian cancer Paternal Aunt     Colon cancer Brother     Thrombophilia Neg Hx      Social History     Socioeconomic History    Marital status:      Spouse name: Not on file    Number of children: Not on file    Years of education: Not on file    Highest education level: Not on file   Occupational History    Not on file   Social Needs    Financial resource strain: Not on file    Food insecurity:     Worry: Not on file     Inability: Not on file    Transportation needs:     Medical: Not on file     Non-medical: Not on file   Tobacco Use    Smoking status: Former Smoker     Packs/day: 1.00     Years: 25.00     Pack years: 25.00     Last attempt to quit: 10/1/2018     Years since quittin.7    Smokeless tobacco: Never Used    Tobacco comment: States started quit 2 months ago after 30 years   Substance and Sexual Activity    Alcohol use: Never     Alcohol/week: 0.0 oz     Frequency: Never     Comment: occasionally  No alcohol 72h prior to sx    Drug use: No    Sexual activity: Not Currently     Partners: Male     Comment: hyst; mut monog   Lifestyle    Physical activity:     Days per week: Not on file     Minutes per session: Not on file    Stress: Not on file   Relationships    Social connections:     Talks on phone: Not on file     Gets together: Not on file     Attends Yarsani service: Not on file     Active member of club or organization: Not on file     Attends meetings of clubs or organizations: Not on file     Relationship status: Not on file   Other Topics Concern    Not on file   Social History  Narrative    Not on file     Past Surgical History:   Procedure Laterality Date    breast reduction  10/02/2018     SECTION      X 1    CYSTOSCOPY, WITH URETERAL STENT INSERTION Right 2019    [facility]    DILATION AND CURETTAGE OF UTERUS      HYSTERECTOMY      RAL for fibroids (still has ovaries)    INSERTION, VENOUS ACCESS PORT Left 2019    [facility]isses Monzon [facility]    PLACEMENT, TRANSOBTURATOR TAPE N/A 2014    Performed by Sayra Aguilar MD at [facility]    PYELOGRAM, RETROGRADE Right 2019    [facility]    ROBOTIC HYSTERECTOMY N/A 2014    [facility] at [facility]    TUBAL LIGATION       Current Outpatient Medications   Medication Sig Dispense Refill    albuterol 90 mcg/actuation inhaler Inhale 2 puffs into the lungs every 4 (four) hours as needed for Wheezing. Rescue 18 g 0    biotin 1 mg tablet Take 1,000 mcg by mouth 3 (three) times daily.      cefadroxil (DURICEF) 500 MG Cap 1  capsule  two times a day 30 capsule 0    dexamethasone (DECADRON) 4 MG Tab Decadron 20 mg (5 tablets) by mouth 12 and 6 hr prior to chemotherapy 20 tablet 3    HYDROcodone-acetaminophen (NORCO) 5-325 mg per tablet Take 1 tablet by mouth every 6 (six) hours as needed for Pain. 10 tablet 0    lidocaine-prilocaine (EMLA) cream Apply topically as needed. 30 g 1    methen-sod phos-meth blue-hyos (UROGESIC-BLUE) 81.6-40.8-0.12 mg Tab Take 1 tablet by mouth 4 (four) times daily.      methen-sod phos-meth blue-hyos (UROGESIC-BLUE) 81.6-40.8-0.12 mg Tab Take 1 tablet by mouth four times daily for 10 days 40 tablet 4    multivitamin with minerals tablet Take 1 tablet by mouth once daily.       olmesartan-hydrochlorothiazide (BENICAR HCT) 40-12.5 mg Tab Take 1 tablet by mouth once daily. 90 tablet 0    ondansetron (ZOFRAN-ODT) 8 MG TbDL Take 1 tablet (8 mg total) by mouth  every 8 (eight) hours as needed. 30 tablet 5    oxyCODONE-acetaminophen (PERCOCET)  mg per tablet Take 1 tablet by mouth every 8 (eight) hours as needed. 20 tablet 0    prochlorperazine (COMPAZINE) 10 MG tablet Take 1 tablet (10 mg total) by mouth every 6 (six) hours as needed. 30 tablet 5    rosuvastatin (CRESTOR) 10 MG tablet Take 1 tablet (10 mg total) by mouth once daily. 90 tablet 1    tolterodine (DETROL) 2 MG Tab Take 1 tablet (2 mg total) by mouth 2 (two) times daily. 60 tablet 11    zinc gluconate 50 mg tablet Take 50 mg by mouth once daily.       No current facility-administered medications for this visit.        Labs:  Lab Results   Component Value Date    WBC 5.12 06/17/2019    HGB 9.1 (L) 06/17/2019    HCT 29.3 (L) 06/17/2019    MCV 94 06/17/2019     (L) 06/17/2019     BMP  Lab Results   Component Value Date     06/17/2019    K 4.2 06/17/2019     06/17/2019    CO2 28 06/17/2019    BUN 14 06/17/2019    CREATININE 0.8 06/17/2019    CALCIUM 10.1 06/17/2019    ANIONGAP 9 06/17/2019    ESTGFRAFRICA >60 06/17/2019    EGFRNONAA >60 06/17/2019     Lab Results   Component Value Date    ALT 14 06/17/2019    AST 18 06/17/2019    ALKPHOS 104 06/17/2019    BILITOT 0.6 06/17/2019       No results found for: IRON, TIBC, FERRITIN, SATURATEDIRO  No results found for: [id number]  No results found for: FOLATE  Lab Results   Component Value Date    TSH 1.869 04/19/2013       I have reviewed the radiology reports and examined the scan/xray images.    Review of Systems   Constitutional: Negative.    HENT: Negative.    Eyes: Negative.    Respiratory: Negative.    Cardiovascular: Negative.    Gastrointestinal: Negative.    Endocrine: Negative.    Genitourinary: Negative.    Musculoskeletal: Negative.    Skin: Negative.    Allergic/Immunologic: Negative.    Neurological: Negative.    Hematological: Negative.    Psychiatric/Behavioral: Negative.      ECOG SCORE    0 - Fully active-able to carry on  all pre-disease performance without restriction            Objective:     Vitals:    07/05/19 1430   BP: (!) 150/90   Pulse: 82   Resp: 16   Temp: 97.8 °F (36.6 °C)   Body mass index is 35.29 kg/m².  Physical Exam   Constitutional: She is oriented to person, place, and time. She appears well-developed and well-nourished.   HENT:   Head: Normocephalic and atraumatic.   Eyes: Conjunctivae and EOM are normal.   Neck: Normal range of motion. Neck supple.   Cardiovascular: Normal rate and regular rhythm.   Pulmonary/Chest: Effort normal and breath sounds normal.   Abdominal: Soft. Bowel sounds are normal.   Musculoskeletal: Normal range of motion.   Neurological: She is alert and oriented to person, place, and time.   Skin: Skin is warm and dry.   Psychiatric: She has a normal mood and affect. Her behavior is normal. Judgment and thought content normal.   Nursing note and vitals reviewed.        Assessment:      1. Malignant neoplasm of right ovary    2. Peritoneal carcinomatosis           Plan:     Malignant neoplasm of right ovary    Peritoneal carcinomatosis    Mrs. Gaines has been seen by Dr. Juarez and is planned for surgery as next step in a few weeks.    Will make return appointment in mid-August.  Will discuss with Dr. Juarez regarding adjuvant treatment.    Has follow up to see Dr. Lindsay in Urology regarding management of ureteral stent.

## 2019-07-09 ENCOUNTER — TELEPHONE (OUTPATIENT)
Dept: INTERNAL MEDICINE | Facility: CLINIC | Age: 64
End: 2019-07-09

## 2019-07-09 DIAGNOSIS — Z12.31 SCREENING MAMMOGRAM, ENCOUNTER FOR: Primary | ICD-10-CM

## 2019-07-09 NOTE — TELEPHONE ENCOUNTER
Called and spoke with patient. She received a card in the mail stating it was time for her mammogram. I told her she isn't due until 3/2020 but I would double check with you on that.

## 2019-07-09 NOTE — TELEPHONE ENCOUNTER
----- Message from Mary Ellen Warner sent at 7/9/2019 11:24 AM CDT -----  Pt is requesting an order be put in for a mammo gram         Please call pt back at  [phone]

## 2019-07-09 NOTE — TELEPHONE ENCOUNTER
Yes, she can have her MMG  The guidelines state every 2 years at Max interval.  I would prefer she gets annually with her medical history  Please schedule

## 2019-07-11 ENCOUNTER — TELEPHONE (OUTPATIENT)
Dept: GYNECOLOGIC ONCOLOGY | Facility: CLINIC | Age: 64
End: 2019-07-11

## 2019-07-11 DIAGNOSIS — C56.9 MALIGNANT NEOPLASM OF OVARY, UNSPECIFIED LATERALITY: Primary | ICD-10-CM

## 2019-07-11 NOTE — TELEPHONE ENCOUNTER
Spoke with pt notified surgery scheduled for Thurs 8/15 at [facility] [place] in [place] with pre-op appt 8/8. Mailed pt copy of appts and surgery date.  MB,RN

## 2019-08-02 ENCOUNTER — TELEPHONE (OUTPATIENT)
Dept: UROLOGY | Facility: CLINIC | Age: 64
End: 2019-08-02

## 2019-08-02 ENCOUNTER — TELEPHONE (OUTPATIENT)
Dept: HEMATOLOGY/ONCOLOGY | Facility: CLINIC | Age: 64
End: 2019-08-02

## 2019-08-02 NOTE — TELEPHONE ENCOUNTER
----- Message from Mary Madrid sent at 8/2/2019  4:25 PM CDT -----  Contact: REAL HERNANDEZ   Type:  Patient Returning Call      Who Called: REAL HRENANDEZ       Who Left Message for Patient: unknown      Does the patient know what this is regarding?: Appointment details      Best Call Back Number: [phone]      Additional Information: n/a

## 2019-08-02 NOTE — TELEPHONE ENCOUNTER
This pt is schedule for Hysterectomy on 8/15. She saw Dr. Santiago earlier this year & was advised to have stents change at 3mo intervals. She has not returned to clinic since then. Please see if you can schedule her an appt with Dr. Pritchett before her upcoming procedure to discuss options.

## 2019-08-02 NOTE — TELEPHONE ENCOUNTER
[facility] being [facility] last week (SW left out note in error). KANIKA was able to start application today, but she explained after informing pt of a potential 30 day pending status wait period, pt became anxious about her current insurance situation.     VU called pt regarding assistance with her health insurance premium if needed. Pt explained she has upcoming surgical procedure and she is afraid she will lose her insurance since she cannot afford premiums due to lack of income. Pt explained she has not been able to work since February due to chemotherapy and no long can afford to pay full price of her premiums. Pt explained she is hoping to return to work a few weeks after surgery and she is hoping to avoid losing current coverage. SW mentioned one-time internal grants that we could use to help her cover her premiums for a month or two while she waits to hear about Medicaid status. Pt also will turn 64 in November and could then look to getting Medicare IF she cannot return to work immediately. Pt verbalized understanding different options.     Pt will meet with VU later today to complete application for [employer] (explained receipts are needed for living expenses or premium payments). Pt also asked about urology appt as she is having issues with pain. SW will contact urology dept to assist with getting her in to see someone ASAP. VU will have update of rpt later today when she comes into clinic to meet.

## 2019-08-02 NOTE — TELEPHONE ENCOUNTER
Spoke with Marilus. She called to cancel her upcoming appointment with  due to the physician not being a [facility] doctor. Per pt she will keep her appointment with the urologist that she see's in [place].

## 2019-08-05 ENCOUNTER — DOCUMENTATION ONLY (OUTPATIENT)
Dept: HEMATOLOGY/ONCOLOGY | Facility: CLINIC | Age: 64
End: 2019-08-05

## 2019-08-08 ENCOUNTER — HOSPITAL ENCOUNTER (OUTPATIENT)
Dept: PREADMISSION TESTING | Facility: OTHER | Age: 64
Discharge: HOME | End: 2019-08-08
Attending: OBSTETRICS & GYNECOLOGY
Payer: COMMERCIAL

## 2019-08-08 ENCOUNTER — ANESTHESIA EVENT (OUTPATIENT)
Dept: SURGERY | Facility: OTHER | Age: 64
End: 2019-08-08
Payer: COMMERCIAL

## 2019-08-08 VITALS
SYSTOLIC BLOOD PRESSURE: 177 MMHG | DIASTOLIC BLOOD PRESSURE: 83 MMHG | OXYGEN SATURATION: 100 % | HEART RATE: 82 BPM | WEIGHT: 224 LBS | HEIGHT: 67 IN | TEMPERATURE: 98 F | BODY MASS INDEX: 35.16 KG/M2

## 2019-08-08 DIAGNOSIS — Z01.818 PREOP TESTING: Primary | ICD-10-CM

## 2019-08-08 LAB
ABO + RH BLD: NORMAL
BASOPHILS # BLD AUTO: 0.05 K/UL (ref 0–0.2)
BASOPHILS NFR BLD: 0.8 % (ref 0–1.9)
BLD GP AB SCN CELLS X3 SERPL QL: NORMAL
DIFFERENTIAL METHOD: ABNORMAL
EOSINOPHIL # BLD AUTO: 0.1 K/UL (ref 0–0.5)
EOSINOPHIL NFR BLD: 2 % (ref 0–8)
ERYTHROCYTE [DISTWIDTH] IN BLOOD BY AUTOMATED COUNT: 17.1 % (ref 11.5–14.5)
HCT VFR BLD AUTO: 33.7 % (ref 37–48.5)
HGB BLD-MCNC: 10.4 G/DL (ref 12–16)
IMM GRANULOCYTES # BLD AUTO: 0.01 K/UL (ref 0–0.04)
IMM GRANULOCYTES NFR BLD AUTO: 0.2 % (ref 0–0.5)
LYMPHOCYTES # BLD AUTO: 1.7 K/UL (ref 1–4.8)
LYMPHOCYTES NFR BLD: 26.3 % (ref 18–48)
MCH RBC QN AUTO: 30.1 PG (ref 27–31)
MCHC RBC AUTO-ENTMCNC: 30.9 G/DL (ref 32–36)
MCV RBC AUTO: 98 FL (ref 82–98)
MONOCYTES # BLD AUTO: 0.6 K/UL (ref 0.3–1)
MONOCYTES NFR BLD: 8.4 % (ref 4–15)
NEUTROPHILS # BLD AUTO: 4.1 K/UL (ref 1.8–7.7)
NEUTROPHILS NFR BLD: 62.3 % (ref 38–73)
NRBC BLD-RTO: 0 /100 WBC
PLATELET # BLD AUTO: 202 K/UL (ref 150–350)
PMV BLD AUTO: 9.8 FL (ref 9.2–12.9)
RBC # BLD AUTO: 3.45 M/UL (ref 4–5.4)
WBC # BLD AUTO: 6.51 K/UL (ref 3.9–12.7)

## 2019-08-08 PROCEDURE — 85025 COMPLETE CBC W/AUTO DIFF WBC: CPT

## 2019-08-08 PROCEDURE — 86850 RBC ANTIBODY SCREEN: CPT

## 2019-08-08 RX ORDER — PREGABALIN 75 MG/1
75 CAPSULE ORAL
Status: CANCELLED | OUTPATIENT
Start: 2019-08-08 | End: 2019-08-08

## 2019-08-08 RX ORDER — SODIUM CHLORIDE, SODIUM LACTATE, POTASSIUM CHLORIDE, CALCIUM CHLORIDE 600; 310; 30; 20 MG/100ML; MG/100ML; MG/100ML; MG/100ML
INJECTION, SOLUTION INTRAVENOUS CONTINUOUS
Status: CANCELLED | OUTPATIENT
Start: 2019-08-08

## 2019-08-08 RX ORDER — ACETAMINOPHEN 500 MG
1000 TABLET ORAL
Status: CANCELLED | OUTPATIENT
Start: 2019-08-08 | End: 2019-08-08

## 2019-08-08 NOTE — ANESTHESIA PREPROCEDURE EVALUATION
08/08/2019  Casie Gaines is a 63 y.o., female.    Anesthesia Evaluation    I have reviewed the Patient Summary Reports.    I have reviewed the Nursing Notes.   I have reviewed the Medications.     Review of Systems  Anesthesia Hx:  No problems with previous Anesthesia  Denies Family Hx of Anesthesia complications.   Denies Personal Hx of Anesthesia complications.   Social:  Former Smoker Quit Oct last year   Hematology/Oncology:         -- Anemia: Current/Recent Cancer. chemotherapy   EENT/Dental:EENT/Dental Normal   Cardiovascular:   Exercise tolerance: good Hypertension, well controlled    Pulmonary:  Pulmonary Normal    Renal/:  Renal/ Normal     Musculoskeletal:  Spine Disorders: cervical    Neurological:  Neurology Normal    Endocrine:  Endocrine Normal    Dermatological:  Skin Normal    Psych:   anxiety          Physical Exam  General:  Obesity    Airway/Jaw/Neck:  Airway Findings: Mouth Opening: Normal Tongue: Normal  General Airway Assessment: Adult  Mallampati: I  TM Distance: Normal, at least 6 cm  Jaw/Neck Findings:  Neck ROM: Normal ROM      Dental:  Dental Findings: In tact             Anesthesia Plan  Type of Anesthesia, risks & benefits discussed:  Anesthesia Type:  general  Patient's Preference:   Intra-op Monitoring Plan: standard ASA monitors  Intra-op Monitoring Plan Comments:   Post Op Pain Control Plan: multimodal analgesia  Post Op Pain Control Plan Comments:   Induction:   IV  Beta Blocker:         Informed Consent: Patient understands risks and agrees with Anesthesia plan.  Questions answered. Anesthesia consent signed with patient.  ASA Score: 3     Day of Surgery Review of History & Physical:    H&P update referred to the surgeon.         Ready For Surgery From Anesthesia Perspective.

## 2019-08-09 ENCOUNTER — HOSPITAL ENCOUNTER (OUTPATIENT)
Dept: RADIOLOGY | Facility: HOSPITAL | Age: 64
Discharge: HOME | End: 2019-08-09
Attending: FAMILY MEDICINE
Payer: COMMERCIAL

## 2019-08-09 VITALS — WEIGHT: 224 LBS | HEIGHT: 67 IN | BODY MASS INDEX: 35.16 KG/M2

## 2019-08-09 DIAGNOSIS — Z12.31 SCREENING MAMMOGRAM, ENCOUNTER FOR: ICD-10-CM

## 2019-08-09 PROCEDURE — 77067 SCR MAMMO BI INCL CAD: CPT | Mod: 26,,, | Performed by: RADIOLOGY

## 2019-08-09 PROCEDURE — 77067 MAMMO DIGITAL SCREENING BILAT WITH TOMOSYNTHESIS_CAD: ICD-10-PCS | Mod: 26,,, | Performed by: RADIOLOGY

## 2019-08-09 PROCEDURE — 77063 MAMMO DIGITAL SCREENING BILAT WITH TOMOSYNTHESIS_CAD: ICD-10-PCS | Mod: 26,,, | Performed by: RADIOLOGY

## 2019-08-09 PROCEDURE — 77067 SCR MAMMO BI INCL CAD: CPT | Mod: TC

## 2019-08-09 PROCEDURE — 77063 BREAST TOMOSYNTHESIS BI: CPT | Mod: 26,,, | Performed by: RADIOLOGY

## 2019-08-10 ENCOUNTER — PATIENT OUTREACH (OUTPATIENT)
Dept: ADMINISTRATIVE | Facility: OTHER | Age: 64
End: 2019-08-10

## 2019-08-11 PROBLEM — R06.09 DOE (DYSPNEA ON EXERTION): Status: RESOLVED | Noted: 2019-03-08 | Resolved: 2019-08-11

## 2019-08-12 ENCOUNTER — OFFICE VISIT (OUTPATIENT)
Dept: INTERNAL MEDICINE | Facility: CLINIC | Age: 64
End: 2019-08-12
Payer: COMMERCIAL

## 2019-08-12 ENCOUNTER — HOSPITAL ENCOUNTER (OUTPATIENT)
Dept: RADIOLOGY | Facility: HOSPITAL | Age: 64
Discharge: HOME | End: 2019-08-12
Attending: FAMILY MEDICINE
Payer: COMMERCIAL

## 2019-08-12 ENCOUNTER — TELEPHONE (OUTPATIENT)
Dept: GYNECOLOGIC ONCOLOGY | Facility: CLINIC | Age: 64
End: 2019-08-12

## 2019-08-12 VITALS
WEIGHT: 220.88 LBS | SYSTOLIC BLOOD PRESSURE: 124 MMHG | DIASTOLIC BLOOD PRESSURE: 88 MMHG | HEIGHT: 67 IN | TEMPERATURE: 96 F | OXYGEN SATURATION: 98 % | HEART RATE: 92 BPM | BODY MASS INDEX: 34.67 KG/M2

## 2019-08-12 DIAGNOSIS — M25.519 SHOULDER PAIN, UNSPECIFIED CHRONICITY, UNSPECIFIED LATERALITY: Primary | ICD-10-CM

## 2019-08-12 DIAGNOSIS — C78.6 PERITONEAL CARCINOMATOSIS: ICD-10-CM

## 2019-08-12 DIAGNOSIS — C56.1 MALIGNANT NEOPLASM OF RIGHT OVARY: ICD-10-CM

## 2019-08-12 DIAGNOSIS — I10 ESSENTIAL HYPERTENSION: ICD-10-CM

## 2019-08-12 DIAGNOSIS — E78.5 HYPERLIPIDEMIA, UNSPECIFIED HYPERLIPIDEMIA TYPE: ICD-10-CM

## 2019-08-12 DIAGNOSIS — R05.9 COUGH: ICD-10-CM

## 2019-08-12 DIAGNOSIS — M25.519 SHOULDER PAIN, UNSPECIFIED CHRONICITY, UNSPECIFIED LATERALITY: ICD-10-CM

## 2019-08-12 DIAGNOSIS — Z96.0 STATUS POST PLACEMENT OF URETERAL STENT: ICD-10-CM

## 2019-08-12 PROCEDURE — 3074F SYST BP LT 130 MM HG: CPT | Mod: CPTII,S$GLB,, | Performed by: FAMILY MEDICINE

## 2019-08-12 PROCEDURE — 99214 PR OFFICE/OUTPT VISIT, EST, LEVL IV, 30-39 MIN: ICD-10-PCS | Mod: S$GLB,,, | Performed by: FAMILY MEDICINE

## 2019-08-12 PROCEDURE — 99214 OFFICE O/P EST MOD 30 MIN: CPT | Mod: S$GLB,,, | Performed by: FAMILY MEDICINE

## 2019-08-12 PROCEDURE — 3079F PR MOST RECENT DIASTOLIC BLOOD PRESSURE 80-89 MM HG: ICD-10-PCS | Mod: CPTII,S$GLB,, | Performed by: FAMILY MEDICINE

## 2019-08-12 PROCEDURE — 3008F BODY MASS INDEX DOCD: CPT | Mod: CPTII,S$GLB,, | Performed by: FAMILY MEDICINE

## 2019-08-12 PROCEDURE — 3079F DIAST BP 80-89 MM HG: CPT | Mod: CPTII,S$GLB,, | Performed by: FAMILY MEDICINE

## 2019-08-12 PROCEDURE — 3008F PR BODY MASS INDEX (BMI) DOCUMENTED: ICD-10-PCS | Mod: CPTII,S$GLB,, | Performed by: FAMILY MEDICINE

## 2019-08-12 PROCEDURE — 3074F PR MOST RECENT SYSTOLIC BLOOD PRESSURE < 130 MM HG: ICD-10-PCS | Mod: CPTII,S$GLB,, | Performed by: FAMILY MEDICINE

## 2019-08-12 PROCEDURE — 73030 XR SHOULDER COMPLETE 2 OR MORE VIEWS BILATERAL: ICD-10-PCS | Mod: 26,,, | Performed by: RADIOLOGY

## 2019-08-12 PROCEDURE — 73030 X-RAY EXAM OF SHOULDER: CPT | Mod: 26,,, | Performed by: RADIOLOGY

## 2019-08-12 PROCEDURE — 73030 X-RAY EXAM OF SHOULDER: CPT | Mod: TC,50

## 2019-08-12 PROCEDURE — 99999 PR PBB SHADOW E&M-EST. PATIENT-LVL V: CPT | Mod: PBBFAC,,, | Performed by: FAMILY MEDICINE

## 2019-08-12 PROCEDURE — 99999 PR PBB SHADOW E&M-EST. PATIENT-LVL V: ICD-10-PCS | Mod: PBBFAC,,, | Performed by: FAMILY MEDICINE

## 2019-08-12 NOTE — TELEPHONE ENCOUNTER
Pt requesting to know surgery arrival time due to son coming from out of town. Informed pt she is currently 2nd case and would have an 0800 arrival time. Pt expressed understanding that surgery arrival time cannot be confirmed 100% until day before surgery.     ----- Message from Michelle Caballero sent at 8/12/2019  2:15 PM CDT -----  Contact: REAL HERNANDEZ [[id number]]  Name of Who is Calling: REAL HERNANDEZ [[id number]]      What is the request in detail: patient would like a call back regarding surgery. Please call     Can the clinic reply by [facility]: no    [facility] [phone]

## 2019-08-12 NOTE — Clinical Note
Are you okay with Ultram this week prior to her sx.I told her No NSAID due to her procedure.  Seems to have shoulder tendonitis.

## 2019-08-12 NOTE — PATIENT INSTRUCTIONS
Understanding Rotator Cuff Tendonitis    Tendons are tough tissues that connect muscles to bone. A group of 4 muscles and their tendons form a cuff around the head of the upper arm bone. This is called the rotator cuff. It connects the upper arm to the shoulder blade. It gives the shoulder joint stability and strength.  If tendons are injured or strained, they may become irritated and swollen (inflamed). This is called tendonitis. Rotator cuff tendonitis may cause shoulder pain and loss of function.  What causes rotator cuff tendonitis?  Tendonitis results when the rotator cuff tendons are injured or overworked. The most common cause of injury is repetitive overhead activities. These can be work-related activities such as reaching, pushing, or lifting. Or they can be sports-related activities such as throwing, swimming, or lifting weights.  Symptoms of rotator cuff tendonitis  Pain on the side of the upper arm is the most common symptom. Pain may get worse with overhead movements or when you raise the arm above shoulder level. It may also hurt to lie on the shoulder at night.  Treatment for rotator cuff tendonitis  Treatment may include the following:  · Active rest. This lets the rotator cuff heal. Active rest means using your arm and shoulder, but avoiding activities that cause pain, such as reaching overhead or sleeping on the shoulder.  · Cold packs. Putting ice packs on the shoulder helps reduce swelling and relieve pain.  · Pain medicines. Prescription or over-the-counter pain medicines can help relieve pain and swelling.  · Arm and shoulder exercises. These help keep the shoulder joint mobile as it heals. They also help improve the strength of muscles around the joint.  Possible complications  It might be tempting to stop using your shoulder completely to avoid pain. But doing so may lead to a condition called frozen shoulder. To help prevent this, following instructions you are given for active rest  and for doing exercises to help your shoulder heal.  When to call your healthcare provider  Call your healthcare provider right away if you have any of these:  · Fever of 100.4°F (38°C) or higher, or as directed  · Symptoms that dont get better, or get worse  · New symptoms   Date Last Reviewed: 3/10/2016  © 5589-4744 [employer]. [address] [place] [address], [place] [address]. All rights reserved. This information is not intended as a substitute for professional medical care. Always follow your healthcare professional's instructions.

## 2019-08-12 NOTE — PROGRESS NOTES
Casie Frias Givens  08/12/2019  [id number]    Rossana Ang MD  Patient Care Team:  Rossana Ang MD as PCP - General (Family Medicine)  Radha Foley LPN as Care Coordinator (Internal Medicine)  Has the patient seen any provider outside of the [facility] network since the last visit? (no). If yes, HIPPA forms completed and records requested.        Visit Type:an urgent visit for a new problem    Chief Complaint:  Chief Complaint   Patient presents with    Shoulder Pain     right side for about a week. she said the left side started friday.    Cough     for about a week. she said a little mucus came up she said light green       History of Present Illness:  63 year old here for shoulder pain, started on right side and now on left. No injury.  It reports it inhibited her sleep. She is not sure why it hurt.  She reports it does radiate to top of bicep. She has to help her arm to raise.     She had cough, mild mucus. No Fever.    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   CA-125 is down to 9 prior to last treatment.  CT done on 7/1 shows a 2.8 cm right adnexal mass and soft tissue thickening around the ureter, stent in place.  Also has a small bowel containing umbilical hernia.  No other carcinomatosis or ascites seen.  Genetics as above.  Feels great.  No new symptoms.  Plan for robotic total hyst this week.    Reports cough, for one week.  No SOB.  Pulse ox normal. Did not take anything.      History:  Past Medical History:   Diagnosis Date    Anemia     Anxiety     Arthritis     knees    Cancer     ovarian    CHF (congestive heart failure)     Hx antineoplastic chemotherapy     last 6/2019    Hyperlipemia     Hypertension     Neck pain     Ovarian cancer 2019    CHEMO     Past Surgical History:   Procedure Laterality Date    breast reduction  10/02/2018      SECTION      X 1    CYSTOSCOPY, WITH URETERAL STENT INSERTION Right 2019    [facility] OR    DILATION AND CURETTAGE OF UTERUS      HYSTERECTOMY      RALH for fibroids (still has ovaries)    INSERTION, VENOUS ACCESS PORT Left 2019    Performed by Ulisses Monzon MD at [facility] OR    PLACEMENT, TRANSOBTURATOR TAPE N/A 2014    Performed by Sayra Aguilar MD at [facility] OR    PYELOGRAM, RETROGRADE Right 2019    Performed by [facility] at [facility] OR    ROBOTIC HYSTERECTOMY N/A 2014    [facility] at [facility] OR    TOTAL REDUCTION MAMMOPLASTY  2018    TUBAL LIGATION       Family History   Problem Relation Age of Onset    Anesthesia problems Other     Breast cancer Maternal Aunt     Breast cancer Paternal Aunt     Ovarian cancer Paternal Aunt     Colon cancer Brother     Thrombophilia Neg Hx      Social History     Socioeconomic History    Marital status:      Spouse name: Not on file    Number of children: Not on file    Years of education: Not on file    Highest education level: Not on file   Occupational History    Not on file   Social Needs    Financial resource strain: Not on file    Food insecurity:     Worry: Not on file     Inability: Not on file    Transportation needs:     Medical: Not on file     Non-medical: Not on file   Tobacco Use    Smoking status: Former Smoker     Packs/day: 1.00     Years: 25.00     Pack years: 25.00     Last attempt to quit: 10/1/2018     Years since quittin.8    Smokeless tobacco: Never Used    Tobacco comment: States started quit 2 months ago after 30 years   Substance and Sexual Activity    Alcohol use: Never     Alcohol/week: 0.0 oz     Frequency: Never     Comment: occasionally  No alcohol 72h prior to sx    Drug use: No    Sexual activity: Not Currently     Partners: Male     Comment: hyst; mut monog   Lifestyle    Physical activity:      Days per week: Not on file     Minutes per session: Not on file    Stress: Not on file   Relationships    Social connections:     Talks on phone: Not on file     Gets together: Not on file     Attends Jew service: Not on file     Active member of club or organization: Not on file     Attends meetings of clubs or organizations: Not on file     Relationship status: Not on file   Other Topics Concern    Not on file   Social History Narrative    Not on file     Patient Active Problem List   Diagnosis    Hypertension    Osteoarthritis of both knees    History of CHF (congestive heart failure)    Hyperlipidemia    Peritoneal carcinomatosis    Morbid obesity    Status post placement of ureteral stent    Preop cardiovascular exam    Abnormal ECG    Malignant neoplasm of right ovary    Ovarian cancer    Pulmonary heart disease, unspecified     Review of patient's allergies indicates:  No Known Allergies    The following were reviewed at this visit: active problem list, medication list, allergies, family history, social history, and health maintenance.    Medications:  Current Outpatient Medications on File Prior to Visit   Medication Sig Dispense Refill    albuterol 90 mcg/actuation inhaler Inhale 2 puffs into the lungs every 4 (four) hours as needed for Wheezing. Rescue 18 g 0    biotin 1 mg tablet Take 1,000 mcg by mouth once daily.       multivitamin with minerals tablet Take 1 tablet by mouth once daily.       olmesartan-hydrochlorothiazide (BENICAR HCT) 40-12.5 mg Tab Take 1 tablet by mouth once daily. 90 tablet 0    rosuvastatin (CRESTOR) 10 MG tablet Take 1 tablet (10 mg total) by mouth once daily. 90 tablet 1    tolterodine (DETROL) 2 MG Tab Take 1 tablet (2 mg total) by mouth 2 (two) times daily. 60 tablet 11    zinc gluconate 50 mg tablet Take 50 mg by mouth once daily.      cefadroxil (DURICEF) 500 MG Cap 1  capsule  two times a day 30 capsule 0    dexamethasone (DECADRON) 4 MG Tab  Decadron 20 mg (5 tablets) by mouth 12 and 6 hr prior to chemotherapy 20 tablet 3    HYDROcodone-acetaminophen (NORCO) 5-325 mg per tablet Take 1 tablet by mouth every 6 (six) hours as needed for Pain. 10 tablet 0    lidocaine-prilocaine (EMLA) cream Apply topically as needed. 30 g 1    methen-sod phos-meth blue-hyos (UROGESIC-BLUE) 81.6-40.8-0.12 mg Tab Take 1 tablet by mouth 4 (four) times daily.      methen-sod phos-meth blue-hyos (UROGESIC-BLUE) 81.6-40.8-0.12 mg Tab Take 1 tablet by mouth four times daily for 10 days 40 tablet 4    ondansetron (ZOFRAN-ODT) 8 MG TbDL Take 1 tablet (8 mg total) by mouth every 8 (eight) hours as needed. 30 tablet 5    oxyCODONE-acetaminophen (PERCOCET)  mg per tablet Take 1 tablet by mouth every 8 (eight) hours as needed. 20 tablet 0    prochlorperazine (COMPAZINE) 10 MG tablet Take 1 tablet (10 mg total) by mouth every 6 (six) hours as needed. 30 tablet 5     No current facility-administered medications on file prior to visit.        Medications have been reviewed and reconciled with patient at this visit.  Barriers to medications present (no)    Adverse reactions to current medications (no)    Over the counter medications reviewed (Yes ), and if needed added to active Medication list at this visit.     Exam:  Wt Readings from Last 3 Encounters:   08/12/19 100.2 kg (220 lb 14.4 oz)   08/09/19 101.6 kg (224 lb)   08/08/19 101.6 kg (224 lb)     Temp Readings from Last 3 Encounters:   08/12/19 96 °F (35.6 °C) (Tympanic)   08/08/19 98 °F (36.7 °C) (Oral)   07/05/19 97.8 °F (36.6 °C) (Oral)     BP Readings from Last 3 Encounters:   08/12/19 124/88   08/08/19 (!) 177/83   07/05/19 (!) 150/90     Pulse Readings from Last 3 Encounters:   08/12/19 92   08/08/19 82   07/05/19 82     Body mass index is 34.6 kg/m².      Review of Systems   Constitutional: Negative.  Negative for chills and fever.   HENT: Negative.  Negative for congestion, sinus pain and sore throat.    Eyes:  Negative for blurred vision and double vision.   Respiratory: Negative for cough, sputum production, shortness of breath and wheezing.    Cardiovascular: Negative for chest pain, palpitations and leg swelling.   Gastrointestinal: Negative for abdominal pain, constipation, diarrhea, heartburn, nausea and vomiting.   Genitourinary: Negative.    Musculoskeletal: Positive for joint pain.   Skin: Negative.  Negative for rash.   Neurological: Negative.    Endo/Heme/Allergies: Negative.  Negative for polydipsia. Does not bruise/bleed easily.   Psychiatric/Behavioral: Negative for depression and substance abuse.     Physical Exam   Constitutional: She is oriented to person, place, and time. She appears well-developed and well-nourished. No distress.   HENT:   Head: Normocephalic and atraumatic.   Right Ear: External ear normal.   Left Ear: External ear normal.   Nose: Nose normal.   Mouth/Throat: Oropharynx is clear and moist. No oropharyngeal exudate.   Eyes: Pupils are equal, round, and reactive to light. Conjunctivae and EOM are normal. Right eye exhibits no discharge. Left eye exhibits no discharge.   Neck: Normal range of motion. Neck supple. No thyromegaly present.   Cardiovascular: Normal rate, regular rhythm, normal heart sounds and intact distal pulses.   No murmur heard.  Pulmonary/Chest: Effort normal and breath sounds normal. No respiratory distress. She has no wheezes.   Abdominal: Soft. Bowel sounds are normal. She exhibits no distension and no mass. There is no tenderness.   Musculoskeletal: Normal range of motion. She exhibits tenderness. She exhibits no edema.   Lymphadenopathy:     She has no cervical adenopathy.   Neurological: She is alert and oriented to person, place, and time. No cranial nerve deficit.   Skin: Capillary refill takes less than 2 seconds. She is not diaphoretic.   Psychiatric: She has a normal mood and affect. Her behavior is normal. Judgment and thought content normal.   Nursing note  and vitals reviewed.      Laboratory Reviewed ({Yes)  Lab Results   Component Value Date    WBC 6.51 08/08/2019    HGB 10.4 (L) 08/08/2019    HCT 33.7 (L) 08/08/2019     08/08/2019    CHOL 171 02/09/2018    TRIG 40 02/09/2018    HDL 66 02/09/2018    ALT 14 06/17/2019    AST 18 06/17/2019     06/17/2019    K 4.2 06/17/2019     06/17/2019    CREATININE 0.8 06/17/2019    BUN 14 06/17/2019    CO2 28 06/17/2019    TSH 1.869 04/19/2013    INR 1.0 01/28/2019       Casie was seen today for shoulder pain and cough.    Diagnoses and all orders for this visit:    Shoulder pain, unspecified chronicity, unspecified laterality  -     X-ray Shoulder 2 or More Views Right; Future  -     Cancel: X-Ray Shoulder 2 or More Views Left; Future  -     X-Ray Shoulder 2 or More views Bilateral; Future    Essential hypertension    Hyperlipidemia, unspecified hyperlipidemia type    Malignant neoplasm of right ovary    Peritoneal carcinomatosis    Status post placement of ureteral stent    Cough      Shoulder, xray ordered  Possible rotator cuff tendonitis  Cannot take NSAID.  WIll message surgery to see if okay with Ultram prior     Discuss need for PT, however, will await her post op period    Surgery for her cancer is take priority.  Will tx shoulder conservatively    BP okay today in office.    Recheck after sx    Long term disability forms requested and completed              Care Plan/Goals: Reviewed  (Yes)  Goals     None          Follow up: Follow up in about 3 months (around 11/12/2019).    After visit summary was printed and given to patient upon discharge today.  Patient goals and care plan are included in After Visit Summary.

## 2019-08-13 NOTE — PROGRESS NOTES
Called and spoke with patient regarding xray results. Patient verbalized understanding. Patient states that she slept on a pillow last night and it helped she said she has been doing the warm compress so it has helped as well. She said she think she will be ok until her sx she said if it gets bad then she will call to let us know but she said she think she will be ok.

## 2019-08-14 ENCOUNTER — TELEPHONE (OUTPATIENT)
Dept: GYNECOLOGIC ONCOLOGY | Facility: CLINIC | Age: 64
End: 2019-08-14

## 2019-08-15 ENCOUNTER — ANESTHESIA (OUTPATIENT)
Dept: SURGERY | Facility: OTHER | Age: 64
End: 2019-08-15
Payer: COMMERCIAL

## 2019-08-15 ENCOUNTER — HOSPITAL ENCOUNTER (OUTPATIENT)
Facility: OTHER | Age: 64
Discharge: HOME | End: 2019-08-16
Attending: OBSTETRICS & GYNECOLOGY | Admitting: OBSTETRICS & GYNECOLOGY
Payer: COMMERCIAL

## 2019-08-15 DIAGNOSIS — C56.9 MALIGNANT NEOPLASM OF OVARY, UNSPECIFIED LATERALITY: ICD-10-CM

## 2019-08-15 DIAGNOSIS — C56.3 OVARIAN CANCER, BILATERAL: ICD-10-CM

## 2019-08-15 DIAGNOSIS — Z90.722 S/P BSO (BILATERAL SALPINGO-OOPHORECTOMY): Primary | ICD-10-CM

## 2019-08-15 PROCEDURE — 27201423 OPTIME MED/SURG SUP & DEVICES STERILE SUPPLY: Performed by: OBSTETRICS & GYNECOLOGY

## 2019-08-15 PROCEDURE — 58662 LAPAROSCOPY EXCISE LESIONS: CPT | Mod: AS,22,, | Performed by: NURSE PRACTITIONER

## 2019-08-15 PROCEDURE — 88305 TISSUE SPECIMEN TO PATHOLOGY - SURGERY: ICD-10-PCS | Mod: 26,,, | Performed by: PATHOLOGY

## 2019-08-15 PROCEDURE — 63600175 PHARM REV CODE 636 W HCPCS: Performed by: OBSTETRICS & GYNECOLOGY

## 2019-08-15 PROCEDURE — 58661 PR LAP,RMV  ADNEXAL STRUCTURE: ICD-10-PCS | Mod: 51,,, | Performed by: OBSTETRICS & GYNECOLOGY

## 2019-08-15 PROCEDURE — 63600175 PHARM REV CODE 636 W HCPCS: Performed by: ANESTHESIOLOGY

## 2019-08-15 PROCEDURE — 63600175 PHARM REV CODE 636 W HCPCS

## 2019-08-15 PROCEDURE — 25000003 PHARM REV CODE 250: Performed by: ANESTHESIOLOGY

## 2019-08-15 PROCEDURE — 63600175 PHARM REV CODE 636 W HCPCS: Performed by: NURSE ANESTHETIST, CERTIFIED REGISTERED

## 2019-08-15 PROCEDURE — 99900035 HC TECH TIME PER 15 MIN (STAT)

## 2019-08-15 PROCEDURE — 94761 N-INVAS EAR/PLS OXIMETRY MLT: CPT

## 2019-08-15 PROCEDURE — 88305 TISSUE EXAM BY PATHOLOGIST: CPT | Mod: 26,,, | Performed by: PATHOLOGY

## 2019-08-15 PROCEDURE — 58662 PR LAP,FULGURATE/EXCISE LESIONS: ICD-10-PCS | Mod: AS,22,, | Performed by: NURSE PRACTITIONER

## 2019-08-15 PROCEDURE — 36000710: Performed by: OBSTETRICS & GYNECOLOGY

## 2019-08-15 PROCEDURE — 25000003 PHARM REV CODE 250: Performed by: NURSE ANESTHETIST, CERTIFIED REGISTERED

## 2019-08-15 PROCEDURE — 58661 PR LAP,RMV  ADNEXAL STRUCTURE: ICD-10-PCS | Mod: AS,51,, | Performed by: NURSE PRACTITIONER

## 2019-08-15 PROCEDURE — 88307 TISSUE SPECIMEN TO PATHOLOGY - SURGERY: ICD-10-PCS | Mod: 26,,, | Performed by: PATHOLOGY

## 2019-08-15 PROCEDURE — 53899 PR UROLOGY SURGERY PROCEDURE UNLISTED: ICD-10-PCS | Mod: ,,, | Performed by: OBSTETRICS & GYNECOLOGY

## 2019-08-15 PROCEDURE — 58661 LAPAROSCOPY REMOVE ADNEXA: CPT | Mod: AS,51,, | Performed by: NURSE PRACTITIONER

## 2019-08-15 PROCEDURE — 88305 TISSUE EXAM BY PATHOLOGIST: CPT | Performed by: PATHOLOGY

## 2019-08-15 PROCEDURE — 36000711: Performed by: OBSTETRICS & GYNECOLOGY

## 2019-08-15 PROCEDURE — 37000008 HC ANESTHESIA 1ST 15 MINUTES: Performed by: OBSTETRICS & GYNECOLOGY

## 2019-08-15 PROCEDURE — 53899 PR UROLOGY SURGERY PROCEDURE UNLISTED: ICD-10-PCS | Mod: ,,, | Performed by: NURSE PRACTITIONER

## 2019-08-15 PROCEDURE — 53899 UNLISTED PX URINARY SYSTEM: CPT | Mod: ,,, | Performed by: OBSTETRICS & GYNECOLOGY

## 2019-08-15 PROCEDURE — P9045 ALBUMIN (HUMAN), 5%, 250 ML: HCPCS | Mod: JG | Performed by: NURSE ANESTHETIST, CERTIFIED REGISTERED

## 2019-08-15 PROCEDURE — 63600175 PHARM REV CODE 636 W HCPCS: Performed by: STUDENT IN AN ORGANIZED HEALTH CARE EDUCATION/TRAINING PROGRAM

## 2019-08-15 PROCEDURE — 71000039 HC RECOVERY, EACH ADD'L HOUR: Performed by: OBSTETRICS & GYNECOLOGY

## 2019-08-15 PROCEDURE — 25000003 PHARM REV CODE 250: Performed by: OBSTETRICS & GYNECOLOGY

## 2019-08-15 PROCEDURE — 53899 UNLISTED PX URINARY SYSTEM: CPT | Mod: ,,, | Performed by: NURSE PRACTITIONER

## 2019-08-15 PROCEDURE — 37000009 HC ANESTHESIA EA ADD 15 MINS: Performed by: OBSTETRICS & GYNECOLOGY

## 2019-08-15 PROCEDURE — 71000033 HC RECOVERY, INTIAL HOUR: Performed by: OBSTETRICS & GYNECOLOGY

## 2019-08-15 PROCEDURE — 58662 PR LAP,FULGURATE/EXCISE LESIONS: ICD-10-PCS | Mod: 22,,, | Performed by: OBSTETRICS & GYNECOLOGY

## 2019-08-15 PROCEDURE — 88307 TISSUE EXAM BY PATHOLOGIST: CPT | Mod: 26,,, | Performed by: PATHOLOGY

## 2019-08-15 PROCEDURE — 58661 LAPAROSCOPY REMOVE ADNEXA: CPT | Mod: 51,,, | Performed by: OBSTETRICS & GYNECOLOGY

## 2019-08-15 PROCEDURE — 58662 LAPAROSCOPY EXCISE LESIONS: CPT | Mod: 22,,, | Performed by: OBSTETRICS & GYNECOLOGY

## 2019-08-15 PROCEDURE — 25000003 PHARM REV CODE 250: Performed by: STUDENT IN AN ORGANIZED HEALTH CARE EDUCATION/TRAINING PROGRAM

## 2019-08-15 RX ORDER — PREGABALIN 75 MG/1
75 CAPSULE ORAL
Status: COMPLETED | OUTPATIENT
Start: 2019-08-15 | End: 2019-08-15

## 2019-08-15 RX ORDER — PHENYLEPHRINE HYDROCHLORIDE 10 MG/ML
INJECTION INTRAVENOUS
Status: DISCONTINUED | OUTPATIENT
Start: 2019-08-15 | End: 2019-08-15

## 2019-08-15 RX ORDER — ONDANSETRON 8 MG/1
8 TABLET, ORALLY DISINTEGRATING ORAL EVERY 8 HOURS PRN
Status: DISCONTINUED | OUTPATIENT
Start: 2019-08-15 | End: 2019-08-16 | Stop reason: HOSPADM

## 2019-08-15 RX ORDER — NEOSTIGMINE METHYLSULFATE 1 MG/ML
INJECTION, SOLUTION INTRAVENOUS
Status: DISCONTINUED | OUTPATIENT
Start: 2019-08-15 | End: 2019-08-15

## 2019-08-15 RX ORDER — ONDANSETRON 2 MG/ML
INJECTION INTRAMUSCULAR; INTRAVENOUS
Status: DISCONTINUED | OUTPATIENT
Start: 2019-08-15 | End: 2019-08-15

## 2019-08-15 RX ORDER — IBUPROFEN 600 MG/1
600 TABLET ORAL 3 TIMES DAILY
Qty: 30 TABLET | Refills: 1 | Status: SHIPPED | OUTPATIENT
Start: 2019-08-15 | End: 2019-09-30

## 2019-08-15 RX ORDER — ROSUVASTATIN CALCIUM 10 MG/1
10 TABLET, COATED ORAL DAILY
Status: DISCONTINUED | OUTPATIENT
Start: 2019-08-16 | End: 2019-08-16 | Stop reason: HOSPADM

## 2019-08-15 RX ORDER — SODIUM CHLORIDE, SODIUM LACTATE, POTASSIUM CHLORIDE, CALCIUM CHLORIDE 600; 310; 30; 20 MG/100ML; MG/100ML; MG/100ML; MG/100ML
INJECTION, SOLUTION INTRAVENOUS CONTINUOUS
Status: DISCONTINUED | OUTPATIENT
Start: 2019-08-15 | End: 2019-08-15

## 2019-08-15 RX ORDER — ALBUMIN HUMAN 50 G/1000ML
SOLUTION INTRAVENOUS CONTINUOUS PRN
Status: DISCONTINUED | OUTPATIENT
Start: 2019-08-15 | End: 2019-08-15

## 2019-08-15 RX ORDER — FENTANYL CITRATE 50 UG/ML
INJECTION, SOLUTION INTRAMUSCULAR; INTRAVENOUS
Status: DISCONTINUED | OUTPATIENT
Start: 2019-08-15 | End: 2019-08-15

## 2019-08-15 RX ORDER — LIDOCAINE HCL/PF 100 MG/5ML
SYRINGE (ML) INTRAVENOUS
Status: DISCONTINUED | OUTPATIENT
Start: 2019-08-15 | End: 2019-08-15

## 2019-08-15 RX ORDER — HYDRALAZINE HYDROCHLORIDE 20 MG/ML
10 INJECTION INTRAMUSCULAR; INTRAVENOUS EVERY 6 HOURS PRN
Status: DISCONTINUED | OUTPATIENT
Start: 2019-08-15 | End: 2019-08-16 | Stop reason: HOSPADM

## 2019-08-15 RX ORDER — DOCUSATE SODIUM 100 MG/1
100 CAPSULE, LIQUID FILLED ORAL 2 TIMES DAILY
Status: DISCONTINUED | OUTPATIENT
Start: 2019-08-15 | End: 2019-08-16 | Stop reason: HOSPADM

## 2019-08-15 RX ORDER — SODIUM CHLORIDE 0.9 % (FLUSH) 0.9 %
3 SYRINGE (ML) INJECTION
Status: DISCONTINUED | OUTPATIENT
Start: 2019-08-15 | End: 2019-08-15

## 2019-08-15 RX ORDER — EPHEDRINE SULFATE 50 MG/ML
INJECTION, SOLUTION INTRAVENOUS
Status: DISCONTINUED | OUTPATIENT
Start: 2019-08-15 | End: 2019-08-15

## 2019-08-15 RX ORDER — GLYCOPYRROLATE 0.2 MG/ML
INJECTION INTRAMUSCULAR; INTRAVENOUS
Status: DISCONTINUED | OUTPATIENT
Start: 2019-08-15 | End: 2019-08-15

## 2019-08-15 RX ORDER — HYDRALAZINE HYDROCHLORIDE 20 MG/ML
10 INJECTION INTRAMUSCULAR; INTRAVENOUS ONCE
Status: COMPLETED | OUTPATIENT
Start: 2019-08-15 | End: 2019-08-15

## 2019-08-15 RX ORDER — OXYBUTYNIN CHLORIDE 5 MG/1
10 TABLET, EXTENDED RELEASE ORAL DAILY
Status: DISCONTINUED | OUTPATIENT
Start: 2019-08-16 | End: 2019-08-16 | Stop reason: HOSPADM

## 2019-08-15 RX ORDER — HYDROCODONE BITARTRATE AND ACETAMINOPHEN 5; 325 MG/1; MG/1
1 TABLET ORAL EVERY 6 HOURS PRN
Qty: 20 TABLET | Refills: 0 | Status: SHIPPED | OUTPATIENT
Start: 2019-08-15 | End: 2020-08-06

## 2019-08-15 RX ORDER — PROMETHAZINE HYDROCHLORIDE 25 MG/1
25 TABLET ORAL EVERY 6 HOURS PRN
Status: DISCONTINUED | OUTPATIENT
Start: 2019-08-15 | End: 2019-08-16 | Stop reason: HOSPADM

## 2019-08-15 RX ORDER — SODIUM CHLORIDE 9 MG/ML
INJECTION, SOLUTION INTRAVENOUS CONTINUOUS
Status: DISCONTINUED | OUTPATIENT
Start: 2019-08-15 | End: 2019-08-15

## 2019-08-15 RX ORDER — MUPIROCIN 20 MG/G
OINTMENT TOPICAL
Status: DISCONTINUED | OUTPATIENT
Start: 2019-08-15 | End: 2019-08-15

## 2019-08-15 RX ORDER — HYDROCODONE BITARTRATE AND ACETAMINOPHEN 5; 325 MG/1; MG/1
1 TABLET ORAL EVERY 4 HOURS PRN
Status: DISCONTINUED | OUTPATIENT
Start: 2019-08-15 | End: 2019-08-16 | Stop reason: HOSPADM

## 2019-08-15 RX ORDER — SIMETHICONE 80 MG
1 TABLET,CHEWABLE ORAL 3 TIMES DAILY
Status: DISCONTINUED | OUTPATIENT
Start: 2019-08-15 | End: 2019-08-16 | Stop reason: HOSPADM

## 2019-08-15 RX ORDER — ONDANSETRON 2 MG/ML
4 INJECTION INTRAMUSCULAR; INTRAVENOUS DAILY PRN
Status: DISCONTINUED | OUTPATIENT
Start: 2019-08-15 | End: 2019-08-15 | Stop reason: HOSPADM

## 2019-08-15 RX ORDER — CEFAZOLIN SODIUM 1 G/3ML
2 INJECTION, POWDER, FOR SOLUTION INTRAMUSCULAR; INTRAVENOUS
Status: COMPLETED | OUTPATIENT
Start: 2019-08-15 | End: 2019-08-15

## 2019-08-15 RX ORDER — ROCURONIUM BROMIDE 10 MG/ML
INJECTION, SOLUTION INTRAVENOUS
Status: DISCONTINUED | OUTPATIENT
Start: 2019-08-15 | End: 2019-08-15

## 2019-08-15 RX ORDER — DEXAMETHASONE SODIUM PHOSPHATE 4 MG/ML
INJECTION, SOLUTION INTRA-ARTICULAR; INTRALESIONAL; INTRAMUSCULAR; INTRAVENOUS; SOFT TISSUE
Status: DISCONTINUED | OUTPATIENT
Start: 2019-08-15 | End: 2019-08-15

## 2019-08-15 RX ORDER — HYDROMORPHONE HYDROCHLORIDE 2 MG/ML
0.4 INJECTION, SOLUTION INTRAMUSCULAR; INTRAVENOUS; SUBCUTANEOUS EVERY 5 MIN PRN
Status: DISCONTINUED | OUTPATIENT
Start: 2019-08-15 | End: 2019-08-15 | Stop reason: HOSPADM

## 2019-08-15 RX ORDER — PROPOFOL 10 MG/ML
VIAL (ML) INTRAVENOUS
Status: DISCONTINUED | OUTPATIENT
Start: 2019-08-15 | End: 2019-08-15

## 2019-08-15 RX ORDER — OXYCODONE HYDROCHLORIDE 5 MG/1
5 TABLET ORAL
Status: DISCONTINUED | OUTPATIENT
Start: 2019-08-15 | End: 2019-08-15 | Stop reason: HOSPADM

## 2019-08-15 RX ORDER — HYDRALAZINE HYDROCHLORIDE 20 MG/ML
INJECTION INTRAMUSCULAR; INTRAVENOUS
Status: COMPLETED
Start: 2019-08-15 | End: 2019-08-15

## 2019-08-15 RX ORDER — ACETAMINOPHEN 500 MG
1000 TABLET ORAL
Status: COMPLETED | OUTPATIENT
Start: 2019-08-15 | End: 2019-08-15

## 2019-08-15 RX ORDER — HYDROMORPHONE HYDROCHLORIDE 1 MG/ML
0.5 INJECTION, SOLUTION INTRAMUSCULAR; INTRAVENOUS; SUBCUTANEOUS
Status: DISCONTINUED | OUTPATIENT
Start: 2019-08-15 | End: 2019-08-16 | Stop reason: HOSPADM

## 2019-08-15 RX ORDER — IBUPROFEN 600 MG/1
600 TABLET ORAL EVERY 6 HOURS
Status: DISCONTINUED | OUTPATIENT
Start: 2019-08-15 | End: 2019-08-16 | Stop reason: HOSPADM

## 2019-08-15 RX ORDER — MEPERIDINE HYDROCHLORIDE 25 MG/ML
12.5 INJECTION INTRAMUSCULAR; INTRAVENOUS; SUBCUTANEOUS ONCE AS NEEDED
Status: DISCONTINUED | OUTPATIENT
Start: 2019-08-15 | End: 2019-08-15 | Stop reason: HOSPADM

## 2019-08-15 RX ORDER — ALBUTEROL SULFATE 90 UG/1
2 AEROSOL, METERED RESPIRATORY (INHALATION) EVERY 4 HOURS PRN
Status: DISCONTINUED | OUTPATIENT
Start: 2019-08-15 | End: 2019-08-16 | Stop reason: HOSPADM

## 2019-08-15 RX ORDER — LIDOCAINE HYDROCHLORIDE 10 MG/ML
1 INJECTION, SOLUTION EPIDURAL; INFILTRATION; INTRACAUDAL; PERINEURAL ONCE
Status: DISCONTINUED | OUTPATIENT
Start: 2019-08-15 | End: 2019-08-15

## 2019-08-15 RX ORDER — HYDROCODONE BITARTRATE AND ACETAMINOPHEN 10; 325 MG/1; MG/1
1 TABLET ORAL EVERY 4 HOURS PRN
Status: DISCONTINUED | OUTPATIENT
Start: 2019-08-15 | End: 2019-08-16 | Stop reason: HOSPADM

## 2019-08-15 RX ADMIN — ALBUMIN (HUMAN): 2.5 SOLUTION INTRAVENOUS at 01:08

## 2019-08-15 RX ADMIN — ONDANSETRON 4 MG: 2 INJECTION INTRAMUSCULAR; INTRAVENOUS at 12:08

## 2019-08-15 RX ADMIN — GLYCOPYRROLATE 0.2 MG: 0.2 INJECTION, SOLUTION INTRAMUSCULAR; INTRAVENOUS at 11:08

## 2019-08-15 RX ADMIN — IBUPROFEN 600 MG: 600 TABLET, FILM COATED ORAL at 06:08

## 2019-08-15 RX ADMIN — CEFAZOLIN 2 G: 330 INJECTION, POWDER, FOR SOLUTION INTRAMUSCULAR; INTRAVENOUS at 11:08

## 2019-08-15 RX ADMIN — HYDROMORPHONE HYDROCHLORIDE 0.4 MG: 2 INJECTION, SOLUTION INTRAMUSCULAR; INTRAVENOUS; SUBCUTANEOUS at 02:08

## 2019-08-15 RX ADMIN — NEOSTIGMINE METHYLSULFATE 5 MG: 1 INJECTION INTRAVENOUS at 01:08

## 2019-08-15 RX ADMIN — ROCURONIUM BROMIDE 10 MG: 10 INJECTION INTRAVENOUS at 11:08

## 2019-08-15 RX ADMIN — OXYCODONE HYDROCHLORIDE 5 MG: 5 TABLET ORAL at 02:08

## 2019-08-15 RX ADMIN — ACETAMINOPHEN 1000 MG: 500 TABLET, FILM COATED ORAL at 08:08

## 2019-08-15 RX ADMIN — LIDOCAINE HYDROCHLORIDE 75 MG: 20 INJECTION, SOLUTION INTRAVENOUS at 11:08

## 2019-08-15 RX ADMIN — MUPIROCIN: 20 OINTMENT TOPICAL at 08:08

## 2019-08-15 RX ADMIN — HYDRALAZINE HYDROCHLORIDE 10 MG: 20 INJECTION INTRAMUSCULAR; INTRAVENOUS at 03:08

## 2019-08-15 RX ADMIN — ALBUMIN (HUMAN): 2.5 SOLUTION INTRAVENOUS at 12:08

## 2019-08-15 RX ADMIN — HYDROMORPHONE HYDROCHLORIDE 0.4 MG: 2 INJECTION, SOLUTION INTRAMUSCULAR; INTRAVENOUS; SUBCUTANEOUS at 01:08

## 2019-08-15 RX ADMIN — PROPOFOL 150 MG: 10 INJECTION, EMULSION INTRAVENOUS at 11:08

## 2019-08-15 RX ADMIN — FENTANYL CITRATE 50 MCG: 50 INJECTION, SOLUTION INTRAMUSCULAR; INTRAVENOUS at 01:08

## 2019-08-15 RX ADMIN — ROCURONIUM BROMIDE 40 MG: 10 INJECTION INTRAVENOUS at 11:08

## 2019-08-15 RX ADMIN — SODIUM CHLORIDE, SODIUM LACTATE, POTASSIUM CHLORIDE, AND CALCIUM CHLORIDE: 600; 310; 30; 20 INJECTION, SOLUTION INTRAVENOUS at 12:08

## 2019-08-15 RX ADMIN — CARBOXYMETHYLCELLULOSE SODIUM 2 DROP: 2.5 SOLUTION/ DROPS OPHTHALMIC at 11:08

## 2019-08-15 RX ADMIN — SIMETHICONE CHEW TAB 80 MG 80 MG: 80 TABLET ORAL at 09:08

## 2019-08-15 RX ADMIN — DEXAMETHASONE SODIUM PHOSPHATE 8 MG: 4 INJECTION, SOLUTION INTRAMUSCULAR; INTRAVENOUS at 12:08

## 2019-08-15 RX ADMIN — ONDANSETRON 8 MG: 8 TABLET, ORALLY DISINTEGRATING ORAL at 11:08

## 2019-08-15 RX ADMIN — EPHEDRINE SULFATE 10 MG: 50 INJECTION INTRAMUSCULAR; INTRAVENOUS; SUBCUTANEOUS at 01:08

## 2019-08-15 RX ADMIN — ROCURONIUM BROMIDE 10 MG: 10 INJECTION INTRAVENOUS at 12:08

## 2019-08-15 RX ADMIN — SODIUM CHLORIDE, SODIUM LACTATE, POTASSIUM CHLORIDE, AND CALCIUM CHLORIDE: 600; 310; 30; 20 INJECTION, SOLUTION INTRAVENOUS at 10:08

## 2019-08-15 RX ADMIN — FENTANYL CITRATE 50 MCG: 50 INJECTION, SOLUTION INTRAMUSCULAR; INTRAVENOUS at 11:08

## 2019-08-15 RX ADMIN — IBUPROFEN 600 MG: 600 TABLET, FILM COATED ORAL at 11:08

## 2019-08-15 RX ADMIN — FENTANYL CITRATE 100 MCG: 50 INJECTION, SOLUTION INTRAMUSCULAR; INTRAVENOUS at 11:08

## 2019-08-15 RX ADMIN — GLYCOPYRROLATE 0.6 MG: 0.2 INJECTION, SOLUTION INTRAMUSCULAR; INTRAVENOUS at 01:08

## 2019-08-15 RX ADMIN — PHENYLEPHRINE HYDROCHLORIDE 100 MCG: 10 INJECTION INTRAVENOUS at 12:08

## 2019-08-15 RX ADMIN — PREGABALIN 75 MG: 75 CAPSULE ORAL at 08:08

## 2019-08-15 RX ADMIN — HYDRALAZINE HYDROCHLORIDE 10 MG: 20 INJECTION INTRAMUSCULAR; INTRAVENOUS at 06:08

## 2019-08-15 RX ADMIN — DOCUSATE SODIUM 100 MG: 100 CAPSULE, LIQUID FILLED ORAL at 09:08

## 2019-08-15 RX ADMIN — HYDROCODONE BITARTRATE AND ACETAMINOPHEN 1 TABLET: 10; 325 TABLET ORAL at 11:08

## 2019-08-15 NOTE — TRANSFER OF CARE
"Anesthesia Transfer of Care Note    Patient: Casie Gaines    Procedure(s) Performed: Procedure(s) (LRB):  XI ROBOTIC SALPINGO-OOPHORECTOMY (Bilateral)  OMENTECTOMY (N/A)  URETEROLYSIS (N/A)    Patient location: PACU    Anesthesia Type: general    Transport from OR: Transported from OR on 2-3 L/min O2 by NC with adequate spontaneous ventilation    Post pain: adequate analgesia    Post assessment: no apparent anesthetic complications    Post vital signs: stable    Level of consciousness: awake and alert    Nausea/Vomiting: no nausea/vomiting    Complications: none    Transfer of care protocol was followed      Last vitals:   Visit Vitals  BP (!) 204/88   Pulse 64   Temp 36.5 °C (97.7 °F) (Oral)   Resp 18   Ht 5' 7" (1.702 m)   Wt 101.6 kg (224 lb)   SpO2 100%   Breastfeeding? No   BMI 35.08 kg/m²     "

## 2019-08-15 NOTE — OR NURSING
No answer from Dr. Ramirez.  GYN beeper paged.  Dr. Campa returned call.. Will come to evaluate.

## 2019-08-15 NOTE — NURSING
Report received from SHAHLA Guevara. Pt arrived to floor via stretcher. Pt is AAOx4. Teds and Scd's applied. Beach to gravity secured to thigh, draining dark, slightly pink urine. Minimal pain reported. Island border dressing CDI. Elevated BP noted, otherwise vitals stable on RA and afebrile. Will continue to monitor.

## 2019-08-15 NOTE — PLAN OF CARE
Problem: Adult Inpatient Plan of Care  Goal: Plan of Care Review  Outcome: Ongoing (interventions implemented as appropriate)  Pt is AAOx4. Pt free of trauma, falls, and injury. Pt free of skin breakdown. Pt dressing is clean, dry, and intact. Pt pain has been moderately controlled by PO pain meds and tolerated well. Pt has been eating and voiding adequately throughout shift. Tolerating diet. Island border dressing CDI. Pt has call light in reach, bed alarm on, bed brakes on, side rails up x2, bed in low position, TEDs/SCDs on, IS at bedside, and nonskid socks on. Pt lying in bed in no distress. Hourly rounding and safety maintained.

## 2019-08-15 NOTE — PLAN OF CARE
Problem: Adult Inpatient Plan of Care  Goal: Plan of Care Review  Outcome: Ongoing (interventions implemented as appropriate)  Patient on RA sat's 97% with no distress and clear BS,prn DPI not needed.

## 2019-08-15 NOTE — ANESTHESIA POSTPROCEDURE EVALUATION
Anesthesia Post Evaluation    Patient: Casie Gaines    Procedure(s) Performed: Procedure(s) (LRB):  XI ROBOTIC SALPINGO-OOPHORECTOMY (Bilateral)  OMENTECTOMY (N/A)  URETEROLYSIS (N/A)    Final Anesthesia Type: general  Patient location during evaluation: PACU  Patient participation: Yes- Able to Participate  Level of consciousness: awake and alert  Post-procedure vital signs: reviewed and stable  Pain management: adequate  Airway patency: patent  PONV status at discharge: No PONV  Anesthetic complications: no      Cardiovascular status: blood pressure returned to baseline  Respiratory status: unassisted and spontaneous ventilation  Hydration status: euvolemic  Follow-up not needed.          Vitals Value Taken Time   /81 8/15/2019  3:13 PM   Temp 36.5 °C (97.7 °F) 8/15/2019  1:40 PM   Pulse 73 8/15/2019  3:16 PM   Resp 18 8/15/2019  2:55 PM   SpO2 94 % 8/15/2019  3:16 PM   Vitals shown include unvalidated device data.      No case tracking events are documented in the log.      Pain/Kym Score: Pain Rating Prior to Med Admin: 8 (8/15/2019  2:35 PM)  Kym Score: 9 (8/15/2019  2:40 PM)

## 2019-08-15 NOTE — OPERATIVE NOTE ADDENDUM
Certification of Assistant at Surgery       Surgery Date: 8/15/2019     Participating Surgeons:  Surgeon(s) and Role:     * Ismael Juarez MD - Primary     * Jj Monroy MD resident-assisting    Procedures:  Procedure(s) (LRB):  XI ROBOTIC SALPINGO-OOPHORECTOMY (Bilateral)  OMENTECTOMY (N/A)    Assistant Surgeon's Certification of Necessity:  I understand that section 1842 (b) (6) (d) of the Social Security Act generally prohibits Medicare Part B reasonable charge payment for the services of assistants at surgery in [facility] hospitals when qualified residents are available to furnish such services. I certify that the services for which payment is claimed were medically necessary, and that no qualified resident was available to perform the services. I further understand that these services are subject to post-payment review by the Medicare carrier.      Katharina Healy NP    08/15/2019  1:07 PM

## 2019-08-15 NOTE — H&P
Interval H&P    Patient seen and examined this morning.   Plans for surgery reviewed and questions answered.   Consents are signed and in the chart.     Please see full H&P from clinic visit with Dr. JERMAINE Juarez below:    Ismael Juarez MD   Physician   Gynecologic Oncology   Progress Notes      Signed   Encounter Date:  4/17/2019   Creation Time:  4/17/2019 10:28 AM                 []Hide copied text    []Hover for details  Subjective:      Patient ID: Casie Gaines is a 63 y.o. female.     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  CA-125 is down to 32 from greater than 2000.  CT performed yesterday showed resolution of her effusion and a majority of her disease except for that surrounding her right ureter.  Tolerating therapy ok.  Denies F/C, N/V, VB.  Per her genetics were normal.  Review of Systems   Constitutional: Negative for activity change, appetite change, chills, fatigue and fever.   HENT: Negative for hearing loss, mouth sores, nosebleeds, sore throat and tinnitus.    Eyes: Negative for visual disturbance.   Respiratory: Negative for cough, chest tightness, shortness of breath and wheezing.    Cardiovascular: Negative for chest pain and leg swelling.   Gastrointestinal: Positive for diarrhea. Negative for abdominal distention, abdominal pain, blood in stool, constipation, nausea and vomiting.   Genitourinary: Negative for dysuria, flank pain, frequency, hematuria, pelvic pain, vaginal bleeding, vaginal discharge and vaginal pain.   Musculoskeletal: Negative for arthralgias and back pain.   Skin: Negative for rash.   Neurological: Negative for dizziness, seizures, syncope, weakness and numbness.   Hematological: Does not bruise/bleed easily.   Psychiatric/Behavioral: Negative for  confusion and sleep disturbance. The patient is not nervous/anxious.          Objective:   Physical Exam:   Constitutional: She appears well-developed and well-nourished. No distress.    HENT:   Head: Normocephalic and atraumatic.    Eyes: No scleral icterus.    Neck: Normal range of motion. Neck supple.    Cardiovascular: Normal rate and intact distal pulses.  Exam reveals no cyanosis and no edema.     Pulmonary/Chest: Effort normal. No respiratory distress. She exhibits no tenderness.         Abdominal: Soft. She exhibits no distension, no fluid wave, no ascites and no mass. There is no tenderness. There is no rebound and no guarding. No hernia.     Genitourinary: Rectum normal and vagina normal. Pelvic exam was performed with patient supine. There is no rash, tenderness or lesion on the right labia. There is no rash, tenderness or lesion on the left labia. Uterus is absent. There is an absent adnexa. Right adnexum displays no mass, no tenderness and no fullness. Left adnexum displays no mass, no tenderness and no fullness. No bleeding or unspecified prolapse of vaginal walls in the vagina. No vaginal discharge found. Vaginal cuff normal.Labial bartholins normal.Cervix exhibits absence.              Lymphadenopathy:     She has no cervical adenopathy.        Right: No inguinal adenopathy present.        Left: No inguinal adenopathy present.     Skin: No cyanosis.          Assessment:      1. Malignant neoplasm of right ovary    2. Peritoneal carcinomatosis          Plan:       Excellent response to treatment based on CT and CA-125.  Given her retroperitoneal persistent disease, would recommend we give an additional 3 cycles of treatment and then operate.  I can't operated on her in next month regardless.  Will let Dr. Trevizo know.  Also would plan on Avastin maintenance following primary treatment.       Electronically signed by Ismael Juarez MD at 4/17/2019 10:51 AM

## 2019-08-15 NOTE — H&P
[facility]  Gynecologic Oncology  H&P    Patient Name: Casie Gaines  MRN: [id number]  Admission Date: 8/15/2019  Primary Care Provider: Rossana Ang MD   Principal Problem: Ovarian cancer, bilateral    Subjective:     Chief Complaint/Reason for Admission: scheduled BSO    History of Present Illness:  Casie Gaines is a 63 y.o.  here for scheduled robot-assisted BSO and omentectomy/staging.     Hospital Course:  08/15/2019 No complaints on DOS. TO OR for planned procedures.    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  T/C/A started 19, s/p C6 on 19  Genetics negative    Past Medical History:   Diagnosis Date    Anemia     Anxiety     Arthritis     knees    Cancer     ovarian    CHF (congestive heart failure)     Hx antineoplastic chemotherapy     last 2019    Hyperlipemia     Hypertension     Neck pain     Ovarian cancer 2019    CHEMO     Past Surgical History:   Procedure Laterality Date    breast reduction  10/02/2018     SECTION      X 1    CYSTOSCOPY, WITH URETERAL STENT INSERTION Right 2019    Performed by [facility]my [facility] IV, [facility] at [facility] OR    DILATION AND CURETTAGE OF UTERUS      HYSTERECTOMY      RALH for fibroids (still has ovaries)    INSERTION, VENOUS ACCESS PORT Left 2019    Performed by Ulisses Monzon MD at [facility] OR    PLACEMENT, TRANSOBTURATOR TAPE N/A 2014    Performed by Sayra Aguilar MD at [facility] OR    PYELOGRAM, RETROGRADE Right 2019    Performed by Timmy Santiago IV, MD at [facility] OR    ROBOTIC HYSTERECTOMY N/A 2014    Performed by [facility]ra [facility]ilar [facility] at [facility] OR    TOTAL REDUCTION MAMMOPLASTY  2018    TUBAL LIGATION       Family History     Problem Relation (Age of Onset)    Anesthesia problems Other    Breast cancer Maternal Aunt, Paternal Aunt    Colon cancer  Brother    Ovarian cancer Paternal Aunt        Tobacco Use    Smoking status: Former Smoker     Packs/day: 1.00     Years: 25.00     Pack years: 25.00     Last attempt to quit: 10/1/2018     Years since quittin.8    Smokeless tobacco: Never Used    Tobacco comment: States started quit 2 months ago after 30 years   Substance and Sexual Activity    Alcohol use: Never     Alcohol/week: 0.0 oz     Frequency: Never     Comment: occasionally  No alcohol 72h prior to sx    Drug use: No    Sexual activity: Not Currently     Partners: Male     Comment: hyst; mut monog       PTA Medications   Medication Sig    biotin 1 mg tablet Take 1,000 mcg by mouth once daily.     methen-sod phos-meth blue-hyos (UROGESIC-BLUE) 81.6-40.8-0.12 mg Tab Take 1 tablet by mouth 4 (four) times daily.    methen-sod phos-meth blue-hyos (UROGESIC-BLUE) 81.6-40.8-0.12 mg Tab Take 1 tablet by mouth four times daily for 10 days    multivitamin with minerals tablet Take 1 tablet by mouth once daily.     olmesartan-hydrochlorothiazide (BENICAR HCT) 40-12.5 mg Tab Take 1 tablet by mouth once daily.    rosuvastatin (CRESTOR) 10 MG tablet Take 1 tablet (10 mg total) by mouth once daily.    tolterodine (DETROL) 2 MG Tab Take 1 tablet (2 mg total) by mouth 2 (two) times daily.    albuterol 90 mcg/actuation inhaler Inhale 2 puffs into the lungs every 4 (four) hours as needed for Wheezing. Rescue    cefadroxil (DURICEF) 500 MG Cap 1  capsule  two times a day    dexamethasone (DECADRON) 4 MG Tab Decadron 20 mg (5 tablets) by mouth 12 and 6 hr prior to chemotherapy    HYDROcodone-acetaminophen (NORCO) 5-325 mg per tablet Take 1 tablet by mouth every 6 (six) hours as needed for Pain.    lidocaine-prilocaine (EMLA) cream Apply topically as needed.    ondansetron (ZOFRAN-ODT) 8 MG TbDL Take 1 tablet (8 mg total) by mouth every 8 (eight) hours as needed.    oxyCODONE-acetaminophen (PERCOCET)  mg per tablet Take 1 tablet by mouth every 8  (eight) hours as needed.    prochlorperazine (COMPAZINE) 10 MG tablet Take 1 tablet (10 mg total) by mouth every 6 (six) hours as needed.    zinc gluconate 50 mg tablet Take 50 mg by mouth once daily.       Review of patient's allergies indicates:  No Known Allergies    Review of Systems   Constitutional: Negative for activity change, chills and fatigue.   Eyes: Negative for visual disturbance.   Respiratory: Negative for shortness of breath.    Cardiovascular: Negative for chest pain and palpitations.   Gastrointestinal: Negative for abdominal pain, constipation, diarrhea, nausea and vomiting.   Genitourinary: Negative for dysuria, vaginal bleeding, vaginal discharge, vaginal pain and vaginal odor.   Musculoskeletal: Negative for myalgias.   Integumentary:  Negative for rash.   Neurological: Negative for headaches.   Psychiatric/Behavioral: Negative for depression.      Objective:     Vital Signs (Most Recent):  Temp: 97.6 °F (36.4 °C) (08/15/19 0912)  Pulse: 77 (08/15/19 0912)  Resp: 16 (08/15/19 0912)  BP: (!) 159/87 (08/15/19 0912)  SpO2: 98 % (08/15/19 0912) Vital Signs (24h Range):  Temp:  [97.6 °F (36.4 °C)] 97.6 °F (36.4 °C)  Pulse:  [77] 77  Resp:  [16] 16  SpO2:  [98 %] 98 %  BP: (159)/(87) 159/87     Weight: 101.6 kg (224 lb)  Body mass index is 35.08 kg/m².    Physical Exam:   Constitutional: She appears well-developed and well-nourished.    HENT:   Head: Normocephalic and atraumatic.    Eyes: Pupils are equal, round, and reactive to light. EOM are normal.    Neck: Normal range of motion. Neck supple.    Cardiovascular: Normal rate, regular rhythm and normal heart sounds.     Pulmonary/Chest: Effort normal and breath sounds normal. No respiratory distress.        Abdominal: Soft. Bowel sounds are normal. She exhibits no distension. There is no tenderness. There is no rebound and no guarding.             Musculoskeletal: Normal range of motion.       Neurological: She is alert.    Skin: Skin is warm and  dry. No rash noted.    Psychiatric: She has a normal mood and affect. Her behavior is normal. Judgment and thought content normal.       Laboratory:  Recent Labs   Lab 08/08/19  1211   WBC 6.51   HGB 10.4*   HCT 33.7*   MCV 98          9    Diagnostic Results:  Imaging reviewed:   CT CAP 7/1/2019: Right ureteral stent remains in place with persistent soft tissue thickening and stranding adjacent to the stent which appears similar to the prior exam. Soft tissue nodularity adjacent to the stent is again noted. The largest soft tissue density is in the right hemipelvis and has decreased in size, measuring 2.8 cm compared with 3.9 cm on the previous exam. No new abnormality. Other stable findings as above    Assessment/Plan:     * Ovarian cancer, bilateral  - took home Detrol and statin this AM  - surgical plan reviewed  - no questions or concerns  - to OR for planned procedures    Morbid obesity  - BMI 35    Hyperlipidemia  - continue home crestor    Hypertension  - BP: (159)/(87) 159/87   - holding home benicar until post-op        Julissa Ramirez MD  Gynecologic Oncology  [facility]

## 2019-08-15 NOTE — ASSESSMENT & PLAN NOTE
- took home Detrol and statin this AM  - surgical plan reviewed  - no questions or concerns  - to OR for planned procedures

## 2019-08-15 NOTE — SUBJECTIVE & OBJECTIVE
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  T/C/A started 19, s/p C6 on 19  Genetics negative    Past Medical History:   Diagnosis Date    Anemia     Anxiety     Arthritis     knees    Cancer     ovarian    CHF (congestive heart failure)     Hx antineoplastic chemotherapy     last 2019    Hyperlipemia     Hypertension     Neck pain     Ovarian cancer 2019    CHEMO     Past Surgical History:   Procedure Laterality Date    breast reduction  10/02/2018     SECTION      X 1    CYSTOSCOPY, WITH URETERAL STENT INSERTION Right 2019    Performed by [facility] at [facility] OR    DILATION AND CURETTAGE OF UTERUS      HYSTERECTOMY      RALH for fibroids (still has ovaries)    INSERTION, VENOUS ACCESS PORT Left 2019    Performed by Ulisses Monzon MD at [facility] OR    PLACEMENT, TRANSOBTURATOR TAPE N/A 2014    Performed by [facility]ra [facility]ilar [facility] at [facility] OR    PYELOGRAM, RETROGRADE Right 2019    Performed by Tim[facility] IV, [facility] at [facility] OR    ROBOTIC HYSTERECTOMY N/A 2014    Performed by [facility] at [facility] OR    TOTAL REDUCTION MAMMOPLASTY  2018    TUBAL LIGATION       Family History     Problem Relation (Age of Onset)    Anesthesia problems Other    Breast cancer Maternal Aunt, Paternal Aunt    Colon cancer Brother    Ovarian cancer Paternal Aunt        Tobacco Use    Smoking status: Former Smoker     Packs/day: 1.00     Years: 25.00     Pack years: 25.00     Last attempt to quit: 10/1/2018     Years since quittin.8    Smokeless tobacco: Never Used    Tobacco comment: States started quit 2 months ago after 30 years   Substance and Sexual Activity    Alcohol use: Never     Alcohol/week: 0.0 oz     Frequency: Never     Comment: occasionally  No alcohol 72h prior to sx    Drug use: No    Sexual activity: Not  Currently     Partners: Male     Comment: hyst; mut monog       PTA Medications   Medication Sig    biotin 1 mg tablet Take 1,000 mcg by mouth once daily.     methen-sod phos-meth blue-hyos (UROGESIC-BLUE) 81.6-40.8-0.12 mg Tab Take 1 tablet by mouth 4 (four) times daily.    methen-sod phos-meth blue-hyos (UROGESIC-BLUE) 81.6-40.8-0.12 mg Tab Take 1 tablet by mouth four times daily for 10 days    multivitamin with minerals tablet Take 1 tablet by mouth once daily.     olmesartan-hydrochlorothiazide (BENICAR HCT) 40-12.5 mg Tab Take 1 tablet by mouth once daily.    rosuvastatin (CRESTOR) 10 MG tablet Take 1 tablet (10 mg total) by mouth once daily.    tolterodine (DETROL) 2 MG Tab Take 1 tablet (2 mg total) by mouth 2 (two) times daily.    albuterol 90 mcg/actuation inhaler Inhale 2 puffs into the lungs every 4 (four) hours as needed for Wheezing. Rescue    cefadroxil (DURICEF) 500 MG Cap 1  capsule  two times a day    dexamethasone (DECADRON) 4 MG Tab Decadron 20 mg (5 tablets) by mouth 12 and 6 hr prior to chemotherapy    HYDROcodone-acetaminophen (NORCO) 5-325 mg per tablet Take 1 tablet by mouth every 6 (six) hours as needed for Pain.    lidocaine-prilocaine (EMLA) cream Apply topically as needed.    ondansetron (ZOFRAN-ODT) 8 MG TbDL Take 1 tablet (8 mg total) by mouth every 8 (eight) hours as needed.    oxyCODONE-acetaminophen (PERCOCET)  mg per tablet Take 1 tablet by mouth every 8 (eight) hours as needed.    prochlorperazine (COMPAZINE) 10 MG tablet Take 1 tablet (10 mg total) by mouth every 6 (six) hours as needed.    zinc gluconate 50 mg tablet Take 50 mg by mouth once daily.       Review of patient's allergies indicates:  No Known Allergies    Review of Systems   Constitutional: Negative for activity change, chills and fatigue.   Eyes: Negative for visual disturbance.   Respiratory: Negative for shortness of breath.    Cardiovascular: Negative for chest pain and palpitations.    Gastrointestinal: Negative for abdominal pain, constipation, diarrhea, nausea and vomiting.   Genitourinary: Negative for dysuria, vaginal bleeding, vaginal discharge, vaginal pain and vaginal odor.   Musculoskeletal: Negative for myalgias.   Integumentary:  Negative for rash.   Neurological: Negative for headaches.   Psychiatric/Behavioral: Negative for depression.      Objective:     Vital Signs (Most Recent):  Temp: 97.6 °F (36.4 °C) (08/15/19 0912)  Pulse: 77 (08/15/19 0912)  Resp: 16 (08/15/19 0912)  BP: (!) 159/87 (08/15/19 0912)  SpO2: 98 % (08/15/19 0912) Vital Signs (24h Range):  Temp:  [97.6 °F (36.4 °C)] 97.6 °F (36.4 °C)  Pulse:  [77] 77  Resp:  [16] 16  SpO2:  [98 %] 98 %  BP: (159)/(87) 159/87     Weight: 101.6 kg (224 lb)  Body mass index is 35.08 kg/m².    Physical Exam:   Constitutional: She appears well-developed and well-nourished.    HENT:   Head: Normocephalic and atraumatic.    Eyes: Pupils are equal, round, and reactive to light. EOM are normal.    Neck: Normal range of motion. Neck supple.    Cardiovascular: Normal rate, regular rhythm and normal heart sounds.     Pulmonary/Chest: Effort normal and breath sounds normal. No respiratory distress.        Abdominal: Soft. Bowel sounds are normal. She exhibits no distension. There is no tenderness. There is no rebound and no guarding.             Musculoskeletal: Normal range of motion.       Neurological: She is alert.    Skin: Skin is warm and dry. No rash noted.    Psychiatric: She has a normal mood and affect. Her behavior is normal. Judgment and thought content normal.       Laboratory:  Recent Labs   Lab 08/08/19  1211   WBC 6.51   HGB 10.4*   HCT 33.7*   MCV 98          9    Diagnostic Results:  Imaging reviewed:   CT CAP 7/1/2019: Right ureteral stent remains in place with persistent soft tissue thickening and stranding adjacent to the stent which appears similar to the prior exam. Soft tissue nodularity adjacent to the stent  is again noted. The largest soft tissue density is in the right hemipelvis and has decreased in size, measuring 2.8 cm compared with 3.9 cm on the previous exam. No new abnormality. Other stable findings as above

## 2019-08-16 VITALS
SYSTOLIC BLOOD PRESSURE: 156 MMHG | WEIGHT: 224 LBS | HEART RATE: 92 BPM | OXYGEN SATURATION: 97 % | BODY MASS INDEX: 35.16 KG/M2 | RESPIRATION RATE: 12 BRPM | DIASTOLIC BLOOD PRESSURE: 82 MMHG | HEIGHT: 67 IN | TEMPERATURE: 97 F

## 2019-08-16 PROBLEM — Z90.722 S/P BSO (BILATERAL SALPINGO-OOPHORECTOMY): Status: ACTIVE | Noted: 2019-08-16

## 2019-08-16 PROCEDURE — 25000003 PHARM REV CODE 250: Performed by: STUDENT IN AN ORGANIZED HEALTH CARE EDUCATION/TRAINING PROGRAM

## 2019-08-16 RX ADMIN — SIMETHICONE CHEW TAB 80 MG 80 MG: 80 TABLET ORAL at 09:08

## 2019-08-16 RX ADMIN — HYDROCODONE BITARTRATE AND ACETAMINOPHEN 1 TABLET: 5; 325 TABLET ORAL at 05:08

## 2019-08-16 RX ADMIN — HYDROCODONE BITARTRATE AND ACETAMINOPHEN 1 TABLET: 5; 325 TABLET ORAL at 11:08

## 2019-08-16 RX ADMIN — IBUPROFEN 600 MG: 600 TABLET, FILM COATED ORAL at 05:08

## 2019-08-16 RX ADMIN — DOCUSATE SODIUM 100 MG: 100 CAPSULE, LIQUID FILLED ORAL at 09:08

## 2019-08-16 RX ADMIN — OXYBUTYNIN CHLORIDE 10 MG: 5 TABLET, EXTENDED RELEASE ORAL at 09:08

## 2019-08-16 RX ADMIN — ROSUVASTATIN CALCIUM 10 MG: 10 TABLET, COATED ORAL at 09:08

## 2019-08-16 NOTE — PROGRESS NOTES
[facility]  Gynecology Oncology  Progress Note    Patient Name: Casie Gaines  MRN: [id number]  Admission Date: 8/15/2019  Primary Care Provider: Rossana Ang MD  Principal Problem: Ovarian cancer, bilateral    Subjective:     Interval History: POD# 1 s/p robotic assisted BSO and omentectomy.   Patient is doing well this morning. She reports mild to moderate abdominal pain that is relieved by scheduled PO pain medications. She has not ambulated yet. She has been voiding via Beach catheter overnight.  Beach catheter just removed, awaiting spontaneous void. She has not passed flatus, and has not had BM. She is tolerating a regular diet without nausea or vomiting.    Scheduled Meds:   docusate sodium  100 mg Oral BID    ibuprofen  600 mg Oral Q6H    oxybutynin  10 mg Oral Daily    rosuvastatin  10 mg Oral Daily    simethicone  1 tablet Oral TID     Continuous Infusions:  PRN Meds:albuterol, hydrALAZINE, HYDROcodone-acetaminophen, HYDROcodone-acetaminophen, HYDROmorphone, ondansetron, promethazine    Review of patient's allergies indicates:  No Known Allergies    Objective:     Vital Signs (Most Recent):  Temp: 98.2 °F (36.8 °C) (08/16/19 0452)  Pulse: 96 (08/16/19 0452)  Resp: 18 (08/16/19 0452)  BP: (!) 147/72 (08/16/19 0452)  SpO2: 96 % (08/16/19 0452) Vital Signs (24h Range):  Temp:  [97.5 °F (36.4 °C)-98.4 °F (36.9 °C)] 98.2 °F (36.8 °C)  Pulse:  [] 96  Resp:  [16-18] 18  SpO2:  [92 %-100 %] 96 %  BP: (138-204)/(69-95) 147/72     Weight: 101.6 kg (224 lb)  Body mass index is 35.08 kg/m².  No LMP recorded. Patient has had a hysterectomy.    I&O (Last 24H):    Intake/Output Summary (Last 24 hours) at 8/16/2019 0637  Last data filed at 8/16/2019 0522  Gross per 24 hour   Intake 2240 ml   Output 1390 ml   Net 850 ml     Physical Exam:   Constitutional: She is oriented to person, place, and time. She appears well-developed.    Cardiovascular: Normal rate, regular rhythm, normal  heart sounds and intact distal pulses.   Pulmonary/Chest: Effort normal and breath sounds normal. No respiratory distress. She has no wheezes.      Abdominal:   Island dressing removed. supraumbilical midline vertical Incision clean, dry, and intact, with steri-strips in place.  Laparoscopic port sites also clean and dry with Steri-Strips in place.  Soft. Non-distended. There is mild tenderness  Genitourinary: Exam deferred  Neurological: She is alert and oriented to person, place, and time.    Skin: Nails show no clubbing.    Psychiatric: She has a normal mood and affect.     Laboratory:  None    Assessment/Plan:     Active Diagnoses:    Diagnosis Date Noted POA    PRINCIPAL PROBLEM:  Ovarian cancer, bilateral [C56.1, C56.2] 08/15/2019 Yes    Status post placement of ureteral stent [Z98.890] 02/08/2019 Not Applicable    Morbid obesity [E66.01] 01/26/2019 Yes    Hyperlipidemia [E78.5] 02/01/2018 Yes    Hypertension [I10] 06/12/2013 Yes    History of CHF (congestive heart failure) [Z86.79] 06/12/2013 Not Applicable      Problems Resolved During this Admission:     POD#1 s/p RA BSO and omentectomy  - continue routine postop advances  - continue regular diet  - Continue IS  - Removed Beach catheter this morning. (UOP adequate at 1.2 mL/kilogram/hour)  - Encourage ambulation  - continue SCDs for DVT PPX  - Continue PO pain medication    Ureteral stent  - continue Ditropan    Hypertension  - resume Benicar upon discharge    Hyperlipidemia  - Crestor continued inpatient    Anticipate discharge today after spontaneous void trial.    Jj Monroy MD PGY 3  Gynecology Oncology  [facility]

## 2019-08-16 NOTE — DISCHARGE SUMMARY
Discharge Summary  Gynecology      Admit Date: 8/15/2019    Discharge Date and Time: 2019     Attending Physician: Ismael Juarez MD    Principal Diagnoses:   S/P BSO (bilateral salpingo-oophorectomy)    Active Hospital Problems    Diagnosis  POA    *S/P BSO (bilateral salpingo-oophorectomy) [Z90.722]  Not Applicable    Ovarian cancer, bilateral [C56.1, C56.2]  Yes    Status post placement of ureteral stent [Z98.890]  Not Applicable    Morbid obesity [E66.01]  Yes    Hyperlipidemia [E78.5]  Yes    Hypertension [I10]  Yes    History of CHF (congestive heart failure) [Z86.79]  Not Applicable      Resolved Hospital Problems   No resolved problems to display.       Procedures:   Procedure(s) (LRB):  XI ROBOTIC SALPINGO-OOPHORECTOMY (Bilateral)  OMENTECTOMY (N/A)  URETEROLYSIS (N/A)    Discharged Condition: good    Hospital Course:   Casie Gaines is a 63 y.o. y.o.  female who presented on 8/15/2019 for the above-listed procedures for the treatment of ovarian cancer s/p chemotherapy. PMH is significant for hypertension, hyperlipidemia, ovarian cancer with mass effect on right ureter.  Right ureteral stent in place during surgery. Patient tolerated the procedure well and was admitted for post-operative care. Post-operative course was uncomplicated.  On day of discharge (POD#1), patient was in stable condition, having met all post-operative milestones. She was urinating spontaneously, ambulating, and tolerating a regular diet without nausea/vomiting. Pain was well-controlled on oral medication. She was discharged with medications and follow up as listed below.     Consults: None    Significant Diagnostic Studies:  No results for input(s): WBC, HGB, HCT, MCV, PLT in the last 168 hours.     Treatments:   1. Surgery as above    Disposition: Home or Self Care    Patient Instructions:   Current Discharge Medication List      START taking these medications    Details   ibuprofen (ADVIL,MOTRIN) 600 MG  tablet Take 1 tablet (600 mg total) by mouth 3 (three) times daily.  Qty: 30 tablet, Refills: 1         CONTINUE these medications which have CHANGED    Details   HYDROcodone-acetaminophen (NORCO) 5-325 mg per tablet Take 1 tablet by mouth every 6 (six) hours as needed.  Qty: 20 tablet, Refills: 0         CONTINUE these medications which have NOT CHANGED    Details   biotin 1 mg tablet Take 1,000 mcg by mouth once daily.       !! methen-sod phos-meth blue-hyos (UROGESIC-BLUE) 81.6-40.8-0.12 mg Tab Take 1 tablet by mouth 4 (four) times daily.      !! methen-sod phos-meth blue-hyos (UROGESIC-BLUE) 81.6-40.8-0.12 mg Tab Take 1 tablet by mouth four times daily for 10 days  Qty: 40 tablet, Refills: 4      multivitamin with minerals tablet Take 1 tablet by mouth once daily.       olmesartan-hydrochlorothiazide (BENICAR HCT) 40-12.5 mg Tab Take 1 tablet by mouth once daily.  Qty: 90 tablet, Refills: 0    Associated Diagnoses: Essential hypertension      rosuvastatin (CRESTOR) 10 MG tablet Take 1 tablet (10 mg total) by mouth once daily.  Qty: 90 tablet, Refills: 1      tolterodine (DETROL) 2 MG Tab Take 1 tablet (2 mg total) by mouth 2 (two) times daily.  Qty: 60 tablet, Refills: 11    Associated Diagnoses: History of renal stent      albuterol 90 mcg/actuation inhaler Inhale 2 puffs into the lungs every 4 (four) hours as needed for Wheezing. Rescue  Qty: 18 g, Refills: 0    Associated Diagnoses: Bronchitis      cefadroxil (DURICEF) 500 MG Cap 1  capsule  two times a day  Qty: 30 capsule, Refills: 0    Comments: **$IMPORTANT- APPLY PATIENT SAVINGS PROGRAM:BIN:[id number], PCN:[id number], GROUP:[id number], ID#[id number], SUBMIT AS PRIMARY INSURANCE TO [facility] ADJUDICATOR. QUESTIONS: [phone]      dexamethasone (DECADRON) 4 MG Tab Decadron 20 mg (5 tablets) by mouth 12 and 6 hr prior to chemotherapy  Qty: 20 tablet, Refills: 3    Associated Diagnoses: Peritoneal carcinomatosis      lidocaine-prilocaine (EMLA) cream Apply topically as  needed.  Qty: 30 g, Refills: 1    Associated Diagnoses: Peritoneal carcinomatosis      ondansetron (ZOFRAN-ODT) 8 MG TbDL Take 1 tablet (8 mg total) by mouth every 8 (eight) hours as needed.  Qty: 30 tablet, Refills: 5    Associated Diagnoses: Peritoneal carcinomatosis      oxyCODONE-acetaminophen (PERCOCET)  mg per tablet Take 1 tablet by mouth every 8 (eight) hours as needed.  Qty: 20 tablet, Refills: 0      prochlorperazine (COMPAZINE) 10 MG tablet Take 1 tablet (10 mg total) by mouth every 6 (six) hours as needed.  Qty: 30 tablet, Refills: 5    Associated Diagnoses: Peritoneal carcinomatosis      zinc gluconate 50 mg tablet Take 50 mg by mouth once daily.       !! - Potential duplicate medications found. Please discuss with provider.          Discharge Procedure Orders   Lifting restrictions   Scheduling Instructions: Do not lift anything heavier than a gallon of milk for 2 weeks.  No heavy lifting until your post-operative visit in 4-6 weeks.     No driving until:   Scheduling Instructions: Pain is well controlled and you are not taking narcotic medications.     Notify your health care provider if you experience any of the following:  temperature >100.4     Notify your health care provider if you experience any of the following:  severe uncontrolled pain     Notify your health care provider if you experience any of the following:  redness, tenderness, or signs of infection (pain, swelling, redness, odor or green/yellow discharge around incision site)     No dressing needed   Scheduling Instructions: Wash with warm soapy water in the shower and pat dry daily.   Remove white surgical strips 5 days after surgery.  Do not soak in a tub for 2 weeks after surgery.     Activity as tolerated       Follow-up Information     Ismael Juarez MD. Schedule an appointment as soon as possible for a visit in 6 weeks.    Specialties:  Gynecologic Oncology, Gynecology, Oncology  Why:  Post-Op  Contact information:  [id number]  [facility] [address]  [phone]               Jj Monroy M.D.  Obstetrics & Gynecology  PGY-3

## 2019-08-16 NOTE — PLAN OF CARE
Problem: Adult Inpatient Plan of Care  Goal: Plan of Care Review  Outcome: Ongoing (interventions implemented as appropriate)  No significant events overnight. Remains free from fall, injury, and skin breakdown. Voiding via Beach throughout night. Beach removed at 05:15. Ambulation adjusted per tolerance. VSS on RA throughout the night. Positions self with assistance. Pain well controlled with PO meds; denies pain. All alarms active and audible. Incisions/dressings dry, intact, with no new drainage. SCDs in place. Plan of care reviewed with patient and all questions answered. Bed low, locked. Call light within reach. Purposeful rounding performed. Resting comfortably in bed, no other complaints at this time.

## 2019-08-16 NOTE — PLAN OF CARE
Discharge Planning:  Patient admitted on 8-15-19  LOS-day 1  Chart reviewed  Discussed care plan with treatment team,  attending Dr Juarez  Consults following are: case mgt.  PCP updated in Epic: Dr Ang  Pharmacy: [facility]  Insurance:   DME at home: n/a  Current dispo:  Home today   Mrs Gaines reports no needs upon discharge   Transportation: spouse at the bedside  Case management to follow as needed       08/16/19 1016   Discharge Assessment   Assessment Type Discharge Planning Assessment   Confirmed/corrected address and phone number on facesheet? Yes   Assessment information obtained from? Patient;Caregiver;Medical Record   Communicated expected length of stay with patient/caregiver yes   Prior to hospitilization cognitive status: Alert/Oriented   Prior to hospitalization functional status: Independent   Current cognitive status: Alert/Oriented   Current Functional Status: Independent   Lives With spouse   Able to Return to Prior Arrangements yes   Is patient able to care for self after discharge? Yes   Equipment Currently Used at Home none   Do you have any problems affording any of your prescribed medications? No   Is the patient taking medications as prescribed? yes   Does the patient have transportation home? Yes   Transportation Anticipated family or friend will provide   Discharge Plan A Home   DME Needed Upon Discharge  none   Patient/Family in Agreement with Plan yes

## 2019-08-16 NOTE — NURSING
Patient is awake, alert, and oriented.  Vital signs are WNL.  Peripheral IV has been discontinued.  Family is at the bedside.  Patient is being discharge home with no services.  Follow-up appointments have been made.   Discharge instructions have been reviewed.  Patient verbalized 100% understanding.  New prescriptions were received by spouse.  New abdominal binder was given for support.  Patient will be provided wheelchair assistance to private vehicle.

## 2019-08-16 NOTE — PLAN OF CARE
08/16/19 1021   Final Note   Assessment Type Final Discharge Note   Anticipated Discharge Disposition Home   Hospital Follow Up  Appt(s) scheduled? Yes   Discharge plans and expectations educations in teach back method with documentation complete? Yes   Right Care Referral Info   Post Acute Recommendation No Care   Referral Type see AVS

## 2019-08-16 NOTE — DISCHARGE SUMMARY
[facility]  Obstetrics & Gynecology  Discharge Summary    Patient Name: Casie Gaines  MRN: [id number]  Admission Date: 8/15/2019  Hospital Length of Stay: 0 days  Discharge Date and Time: No discharge date for patient encounter.  Attending Physician: Ismael Juarez MD   Discharging Provider: Yolanda Freedman MD  Primary Care Provider: Rossana Ang MD    Hospital Course: Patient presented for scheduled procedure. Patient was passed back to OR for RATLH/BSO/Ureterolysis/OMX. Please see OP note for further details. Tolerated procedure well and patient was taken to recovery in a stable condition. She was observed overnight without complication. Prior to discharge patient was able to void, ambulate, tolerate PO and pain was well controlled with PO meds. Patient was given routine post-op instructions for which patient voiced understanding. Patient was subsequently discharged home.      Procedure(s) (LRB):  XI ROBOTIC SALPINGO-OOPHORECTOMY (Bilateral)  OMENTECTOMY (N/A)  URETEROLYSIS (N/A)       Pending Diagnostic Studies:     None        Final Active Diagnoses:    Diagnosis Date Noted POA    PRINCIPAL PROBLEM:  S/P BSO (bilateral salpingo-oophorectomy) [Z90.722] 08/16/2019 Not Applicable    Ovarian cancer, bilateral [C56.1, C56.2] 08/15/2019 Yes    Status post placement of ureteral stent [Z98.890] 02/08/2019 Not Applicable    Morbid obesity [E66.01] 01/26/2019 Yes    Hyperlipidemia [E78.5] 02/01/2018 Yes    Hypertension [I10] 06/12/2013 Yes    History of CHF (congestive heart failure) [Z86.79] 06/12/2013 Not Applicable      Problems Resolved During this Admission:        Discharged Condition: good    Disposition: Home or Self Care    Follow Up:  Follow-up Information     Ismael Juarez MD. Schedule an appointment as soon as possible for a visit in 6 weeks.    Specialties:  Gynecologic Oncology, Gynecology, Oncology  Why:  Post-Op  Contact information:  [address] [place]  [place] [address]  [phone]                 Patient Instructions:      Lifting restrictions   Scheduling Instructions: Do not lift anything heavier than a gallon of milk for 2 weeks.  No heavy lifting until your post-operative visit in 4-6 weeks.     No driving until:   Scheduling Instructions: Pain is well controlled and you are not taking narcotic medications.     Notify your health care provider if you experience any of the following:  temperature >100.4     Notify your health care provider if you experience any of the following:  severe uncontrolled pain     Notify your health care provider if you experience any of the following:  redness, tenderness, or signs of infection (pain, swelling, redness, odor or green/yellow discharge around incision site)     No dressing needed   Scheduling Instructions: Wash with warm soapy water in the shower and pat dry daily.   Remove white surgical strips 5 days after surgery.  Do not soak in a tub for 2 weeks after surgery.     Activity as tolerated     Medications:  Reconciled Home Medications:      Medication List      START taking these medications    ibuprofen 600 MG tablet  Commonly known as:  ADVIL,MOTRIN  Take 1 tablet (600 mg total) by mouth 3 (three) times daily.        CHANGE how you take these medications    HYDROcodone-acetaminophen 5-325 mg per tablet  Commonly known as:  NORCO  Take 1 tablet by mouth every 6 (six) hours as needed.  What changed:  reasons to take this        CONTINUE taking these medications    albuterol 90 mcg/actuation inhaler  Commonly known as:  PROVENTIL/VENTOLIN HFA  Inhale 2 puffs into the lungs every 4 (four) hours as needed for Wheezing. Rescue     biotin 1 mg tablet  Take 1,000 mcg by mouth once daily.     cefadroxil 500 MG Cap  Commonly known as:  DURICEF  1  capsule  two times a day     dexAMETHasone 4 MG Tab  Commonly known as:  DECADRON  Decadron 20 mg (5 tablets) by mouth 12 and 6 hr prior to chemotherapy     lidocaine-prilocaine  cream  Commonly known as:  EMLA  Apply topically as needed.     multivitamin with minerals tablet  Take 1 tablet by mouth once daily.     olmesartan-hydrochlorothiazide 40-12.5 mg Tab  Commonly known as:  BENICAR HCT  Take 1 tablet by mouth once daily.     ondansetron 8 MG Tbdl  Commonly known as:  ZOFRAN-ODT  Take 1 tablet (8 mg total) by mouth every 8 (eight) hours as needed.     oxyCODONE-acetaminophen  mg per tablet  Commonly known as:  PERCOCET  Take 1 tablet by mouth every 8 (eight) hours as needed.     prochlorperazine 10 MG tablet  Commonly known as:  COMPAZINE  Take 1 tablet (10 mg total) by mouth every 6 (six) hours as needed.     rosuvastatin 10 MG tablet  Commonly known as:  CRESTOR  Take 1 tablet (10 mg total) by mouth once daily.     tolterodine 2 MG Tab  Commonly known as:  DETROL  Take 1 tablet (2 mg total) by mouth 2 (two) times daily.     * UROGESIC-BLUE 81.6-40.8-0.12 mg Tab  Generic drug:  methen-sod phos-meth blue-hyos  Take 1 tablet by mouth 4 (four) times daily.     * UROGESIC-BLUE 81.6-40.8-0.12 mg Tab  Generic drug:  methen-sod phos-meth blue-hyos  Take 1 tablet by mouth four times daily for 10 days     zinc gluconate 50 mg tablet  Take 50 mg by mouth once daily.         * This list has 2 medication(s) that are the same as other medications prescribed for you. Read the directions carefully, and ask your doctor or other care provider to review them with you.                Yolanda Freedman MD  Obstetrics & Gynecology  [facility]

## 2019-08-16 NOTE — PLAN OF CARE
Problem: Adult Inpatient Plan of Care  Goal: Plan of Care Review  Outcome: Ongoing (interventions implemented as appropriate)  AAOx4.  NAD noted.  Pt remained free from injury or falls.  Pt remains free from skin breakdowns. Vitals signs stable throughout shift on RA.  Positions self independently.  Voiding pink tinged urine adequately throughout shift via Beach catheter.  Pain managed with PO medication.  Pt remained afebrile this shift. Midline abdominal incision,dressed with island boarder dressing, with a small amount of sanguineous drainage.  Lap sites x4 dressed with steri-strips, CDI.  Pt able to voice needs and concerns.  Purposeful rounding done.  All needs met.  Bed locked in lowest position, call bell in reach.  Will continue to monitor.

## 2019-08-16 NOTE — NURSING
Received report on pt from SHAHLA Hernandez. No acute changes in status/previous assessment. Will continue to monitor.

## 2019-08-16 NOTE — PLAN OF CARE
Problem: Adult Inpatient Plan of Care  Goal: Plan of Care Review  Outcome: Ongoing (interventions implemented as appropriate)  Pt on room air, SpO2 95%. No respiratory distress noted.

## 2019-08-19 NOTE — OP NOTE
DATE OF PROCEDURE:  8/15/19     SURGEON: Ismael Juarez        ASSISTANTS: Jj Monroy MD; Katharina Healy NP who served as first assist     PREOPERATIVE DIAGNOSES: Stage IV ovarian cancer, s/p neoadjuvant chemotherapy  Previous right ureteral obstruction with indwelling right ureteral stent  Surgically absent uterus     POSTOPERATIVE DIAGNOSES: Same, with no clinical evidence of disease  Retroperitoneal fibrosis     PROCEDURES:  Robotic-assisted bilateral salpingo-oophorectomy, right radical retroperitoneal dissection and ureterolysis secondary to retroperitoneal fibrosis, and minilap with omentectomy     COMPLICATIONS: None     ESTIMATED BLOOD LOSS: 50 cc     ANESTHESIA: GETA     INTRAOPERATIVE FINDINGS: No gross residual cancer.  Left tube and ovary adhered to the left pelvic sidewall.  Right tube and ovary densely adhered to the right pelvic sidewall, the ureter, and the external iliac artery and vein.      PROCEDURE IN DETAIL: Informed consent was obtained and the patient was taken to   the Operating Suite.  General anesthesia was administered.  Once felt to be   adequate, she was placed in dorsal lithotomy position with her arms tucked.  The   abdomen and pelvis were prepped and draped in the usual fashion and a Beach catheter was placed to gravity drainage and   attention was turned to the abdominal portion of the procedure. A Veress needle was placed through Horner's point and opening pressure was 2.  Pneumoperitoneum was obtained with carbon dioxide and once this was felt to be adequate, a 5mm visiport was used to enter the abdomen under direct visualization and the above stated findings were noted.  An 8 mm   incision was made above the umbilicus and a robotic trocar was placed through this.  Intraperitoneal  placement was confirmed with the camera.  Lateral robotic trocars x 3 were   placed through 8 mm incisions, with replacement of the 5 mm LUQ port with one of the robot ports.  A right upper  quadrant 8 mm AirSeal accessory port was placed.  The patient was placed in steep Trendelenburg.  The robot was   docked and instruments were passed in the operative field.  Attention was first turned to the left side   where the left round ligament was transected after sacrificing with bipolar   cautery.  The anterior and posterior leaflets of the broad ligament were opened.  The pararectal space was developed.  The ureter was identified and a window   was made beneath the infundibulopelvic ligament.  This was sacrificed with   bipolar cautery and transected.  The remnant uteroovarian ligament was then sacrificed with bipolar cautery and cut.  The right ovary was more difficult to remove owing to the prior tumor and ureteral obstruction there.  A lateral peritoneal incision was made and the retroperitoneum was entered.  The psoas muscle was identified, as were the vessels of the IP.  Care was taken with meticulous dissection to identify the iliac vein and artery.  The pararectal space was then developed, and dense adhesions of the ureter and the IP were carefully mobilized medially away from the major pelvic vasculature.  This process took approximately 45 minutes.  The ureter was then dissected free from the ovarian blood supply.  The IP was isolated and transected after sacrificing it with cautery.  The remaining peritoneal attachments were taken down with cautery and the ovary removed.  Both tubes and ovaries were placed in an endocatch bag for later removal.    The pelvis was irrigated and noted to be   hemostatic.  Hemoblast was applied. Once hemostasis was confirmed, the   instruments were removed, the robot was undocked and the pneumoperitoneum was   evacuated.  The patient was flattened.  All ports were removed and port sites   were inspected and made hemostatic with electrocautery and closed with   subcuticular 4-0 Monocryl suture. The camera midline port was extended and carried down to the fascia with  monopolar cautery.  The fascial defect was extended and the transverse colon and omentum mobilized into the defect.  A complete infracolic omentectomy was then performed with the ligasure device in the usual manner after isolation of the omental vessels.  Once the pedicles were secure, the colon was placed back into the abdomen, the fascia was grasped with Kocher clamps, and closed in a running fashion with PDS suture.  The skin was closed with monocryl, and steri-Strips and a pressure dressing were   applied and the patient was awoken and taken to Recovery Room in stable   condition.  Of note, I was present for and performed all key aspects of   procedure.  Katharina Healy's expertise was needed as there was no qualified   resident available.

## 2019-08-22 ENCOUNTER — TELEPHONE (OUTPATIENT)
Dept: GYNECOLOGIC ONCOLOGY | Facility: CLINIC | Age: 64
End: 2019-08-22

## 2019-08-22 NOTE — TELEPHONE ENCOUNTER
Called and talked to her.  She is doing well post-op.  Final path was negative for residual cancer.

## 2019-08-26 ENCOUNTER — TELEPHONE (OUTPATIENT)
Dept: INTERNAL MEDICINE | Facility: CLINIC | Age: 64
End: 2019-08-26

## 2019-08-26 NOTE — TELEPHONE ENCOUNTER
----- Message from Aurora Warner sent at 8/26/2019  9:16 AM CDT -----  Contact: Self- [phone]  Pt would like a call form nurse in regards to paperwork for pt job. Please call back at [phone].       Thank You,  Aurora Warner

## 2019-09-03 ENCOUNTER — PATIENT OUTREACH (OUTPATIENT)
Dept: ADMINISTRATIVE | Facility: OTHER | Age: 64
End: 2019-09-03

## 2019-09-18 ENCOUNTER — OFFICE VISIT (OUTPATIENT)
Dept: GYNECOLOGIC ONCOLOGY | Facility: CLINIC | Age: 64
End: 2019-09-18
Payer: COMMERCIAL

## 2019-09-18 VITALS
HEART RATE: 78 BPM | DIASTOLIC BLOOD PRESSURE: 80 MMHG | WEIGHT: 231.25 LBS | SYSTOLIC BLOOD PRESSURE: 150 MMHG | HEIGHT: 67 IN | BODY MASS INDEX: 36.29 KG/M2

## 2019-09-18 DIAGNOSIS — C56.1 MALIGNANT NEOPLASM OF RIGHT OVARY: ICD-10-CM

## 2019-09-18 DIAGNOSIS — C78.6 PERITONEAL CARCINOMATOSIS: Primary | ICD-10-CM

## 2019-09-18 DIAGNOSIS — E66.01 MORBID OBESITY: ICD-10-CM

## 2019-09-18 DIAGNOSIS — Z90.722 S/P BSO (BILATERAL SALPINGO-OOPHORECTOMY): ICD-10-CM

## 2019-09-18 DIAGNOSIS — Z51.11 ENCOUNTER FOR ANTINEOPLASTIC CHEMOTHERAPY: ICD-10-CM

## 2019-09-18 PROCEDURE — 3008F BODY MASS INDEX DOCD: CPT | Mod: CPTII,S$GLB,, | Performed by: OBSTETRICS & GYNECOLOGY

## 2019-09-18 PROCEDURE — 99214 PR OFFICE/OUTPT VISIT, EST, LEVL IV, 30-39 MIN: ICD-10-PCS | Mod: 24,S$GLB,, | Performed by: OBSTETRICS & GYNECOLOGY

## 2019-09-18 PROCEDURE — 3077F PR MOST RECENT SYSTOLIC BLOOD PRESSURE >= 140 MM HG: ICD-10-PCS | Mod: CPTII,S$GLB,, | Performed by: OBSTETRICS & GYNECOLOGY

## 2019-09-18 PROCEDURE — 99214 OFFICE O/P EST MOD 30 MIN: CPT | Mod: 24,S$GLB,, | Performed by: OBSTETRICS & GYNECOLOGY

## 2019-09-18 PROCEDURE — 3077F SYST BP >= 140 MM HG: CPT | Mod: CPTII,S$GLB,, | Performed by: OBSTETRICS & GYNECOLOGY

## 2019-09-18 PROCEDURE — 3079F PR MOST RECENT DIASTOLIC BLOOD PRESSURE 80-89 MM HG: ICD-10-PCS | Mod: CPTII,S$GLB,, | Performed by: OBSTETRICS & GYNECOLOGY

## 2019-09-18 PROCEDURE — 3079F DIAST BP 80-89 MM HG: CPT | Mod: CPTII,S$GLB,, | Performed by: OBSTETRICS & GYNECOLOGY

## 2019-09-18 PROCEDURE — 99999 PR PBB SHADOW E&M-EST. PATIENT-LVL III: CPT | Mod: PBBFAC,,, | Performed by: OBSTETRICS & GYNECOLOGY

## 2019-09-18 PROCEDURE — 3008F PR BODY MASS INDEX (BMI) DOCUMENTED: ICD-10-PCS | Mod: CPTII,S$GLB,, | Performed by: OBSTETRICS & GYNECOLOGY

## 2019-09-18 PROCEDURE — 99999 PR PBB SHADOW E&M-EST. PATIENT-LVL III: ICD-10-PCS | Mod: PBBFAC,,, | Performed by: OBSTETRICS & GYNECOLOGY

## 2019-09-18 NOTE — PROGRESS NOTES
Subjective:      Patient ID: Casie Gaines is a 63 y.o. female.    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  Had a complete pathologic response.  Doing well.  Denies F/C, N/V, VB.  Reports normal bladder and BM.  Reports pain in the abck she believes is stent related.  Review of Systems   Constitutional: Negative for activity change, appetite change, chills, fatigue and fever.   HENT: Negative for hearing loss, mouth sores, nosebleeds, sore throat and tinnitus.    Eyes: Negative for visual disturbance.   Respiratory: Negative for cough, chest tightness, shortness of breath and wheezing.    Cardiovascular: Negative for chest pain and leg swelling.   Gastrointestinal: Negative for abdominal distention, abdominal pain, blood in stool, constipation, diarrhea, nausea and vomiting.   Genitourinary: Negative for dysuria, flank pain, frequency, hematuria, pelvic pain, vaginal bleeding, vaginal discharge and vaginal pain.   Musculoskeletal: Negative for arthralgias and back pain.   Skin: Negative for rash.   Neurological: Negative for dizziness, seizures, syncope, weakness and numbness.   Hematological: Does not bruise/bleed easily.   Psychiatric/Behavioral: Negative for confusion and sleep disturbance. The patient is not nervous/anxious.        Objective:   Physical Exam:   Constitutional: She appears well-developed and well-nourished. No distress.    HENT:   Head: Normocephalic and atraumatic.    Eyes: No scleral icterus.    Neck: Normal range of motion. Neck supple.     Cardiovascular: Normal rate and intact distal pulses.  Exam reveals no cyanosis and no edema.     Pulmonary/Chest: Effort normal. No respiratory distress. She exhibits no tenderness.        Abdominal: Soft. She exhibits no distension (incisions healing well), no fluid wave, no ascites and no mass. There is no tenderness. There is no rebound and no guarding. No hernia.         Genitourinary: Rectum normal and vagina normal. Pelvic exam was performed with patient supine. There is no rash, tenderness or lesion on the right labia. There is no rash, tenderness or lesion on the left labia. Uterus is absent. There is an absent adnexa. Right adnexum displays no mass, no tenderness and no fullness. Left adnexum displays no mass, no tenderness and no fullness. No bleeding or unspecified prolapse of vaginal walls in the vagina. No vaginal discharge found. Vaginal cuff normal.Labial bartholins normal.Cervix exhibits absence.              Lymphadenopathy:     She has no cervical adenopathy.        Right: No inguinal adenopathy present.        Left: No inguinal adenopathy present.     Skin: No cyanosis.        Assessment:     1. Peritoneal carcinomatosis    2. Malignant neoplasm of right ovary    3. S/P BSO (bilateral salpingo-oophorectomy)    4. Morbid obesity    5. Encounter for antineoplastic chemotherapy        Plan:       Doing well post-op.  I told her to contact urology to arrange stent removal in 2 weeks.  I also spoke with her about Avastin maintenance versus observation.  She is currently undecided.  Will have her see Dr. Mckeon.  Plan for her to RTC in 2 months.  Would like to be seen in  so will have her F/U with Peter.

## 2019-09-23 ENCOUNTER — TELEPHONE (OUTPATIENT)
Dept: INTERNAL MEDICINE | Facility: CLINIC | Age: 64
End: 2019-09-23

## 2019-09-23 NOTE — TELEPHONE ENCOUNTER
----- Message from Mercedes Lundberg sent at 9/23/2019  3:23 PM CDT -----  Contact: dr. tennille kc office-heather  Type:  Needs Medical Advice    Who Called: heather  Symptoms (please be specific): n/a   How long has patient had these symptoms: n/a  Pharmacy name and phone #: n/a  Would the patient rather a call back or a response via [facility]? Call back  Best Call Back Number: [phone]  Additional Information: requesting last office notes and copy of EKG.    Thanks,  Mercedes Lundberg

## 2019-09-24 ENCOUNTER — TELEPHONE (OUTPATIENT)
Dept: HEMATOLOGY/ONCOLOGY | Facility: CLINIC | Age: 64
End: 2019-09-24

## 2019-09-24 NOTE — TELEPHONE ENCOUNTER
----- Message from Mary Mendosa sent at 9/24/2019 10:20 AM CDT -----  Contact: pt   Stated she calling with information about an appointment she had on 09/18, she can be reached at [phone] Thanks

## 2019-09-30 ENCOUNTER — OFFICE VISIT (OUTPATIENT)
Dept: PODIATRY | Facility: CLINIC | Age: 64
End: 2019-09-30
Payer: MEDICAID

## 2019-09-30 VITALS — BODY MASS INDEX: 36.66 KG/M2 | HEIGHT: 67 IN | WEIGHT: 233.56 LBS | RESPIRATION RATE: 17 BRPM

## 2019-09-30 DIAGNOSIS — M19.072 OSTEOARTHRITIS OF LEFT ANKLE OR FOOT: ICD-10-CM

## 2019-09-30 DIAGNOSIS — Q74.2 ACCESSORY NAVICULAR BONE OF LEFT FOOT: ICD-10-CM

## 2019-09-30 DIAGNOSIS — M21.42 ACQUIRED PES PLANUS, LEFT: ICD-10-CM

## 2019-09-30 DIAGNOSIS — M25.572 PAIN IN LEFT ANKLE AND JOINTS OF LEFT FOOT: ICD-10-CM

## 2019-09-30 DIAGNOSIS — M24.572 CONTRACTURE, LEFT ANKLE: ICD-10-CM

## 2019-09-30 DIAGNOSIS — M76.822 POSTERIOR TIBIAL TENDON DYSFUNCTION, LEFT: Primary | ICD-10-CM

## 2019-09-30 PROCEDURE — 99999 PR PBB SHADOW E&M-EST. PATIENT-LVL III: ICD-10-PCS | Mod: PBBFAC,,, | Performed by: PODIATRIST

## 2019-09-30 PROCEDURE — 99214 OFFICE O/P EST MOD 30 MIN: CPT | Mod: S$PBB,,, | Performed by: PODIATRIST

## 2019-09-30 PROCEDURE — 99214 PR OFFICE/OUTPT VISIT, EST, LEVL IV, 30-39 MIN: ICD-10-PCS | Mod: S$PBB,,, | Performed by: PODIATRIST

## 2019-09-30 PROCEDURE — 99213 OFFICE O/P EST LOW 20 MIN: CPT | Mod: PBBFAC | Performed by: PODIATRIST

## 2019-09-30 PROCEDURE — 99999 PR PBB SHADOW E&M-EST. PATIENT-LVL III: CPT | Mod: PBBFAC,,, | Performed by: PODIATRIST

## 2019-09-30 RX ORDER — IBUPROFEN 800 MG/1
800 TABLET ORAL 2 TIMES DAILY
Qty: 60 TABLET | Refills: 1 | Status: SHIPPED | OUTPATIENT
Start: 2019-09-30 | End: 2019-12-18

## 2019-09-30 NOTE — PROGRESS NOTES
Subjective:       Patient ID: aCsie Gaines is a 63 y.o. female.    Chief Complaint: Foot Pain (Right foot pain, wears tennis, non daibetic, rates pain 2/10,  PCP Dr. Ang)      HPI: Casie Gaines presents to the office with the chief complaint of pains to the left foot and ankle at the medial aspect. The pains are rated as 3/10 and are described as mild to moderate in nature. The pains have been on going now for the past several weeks to a month or so, and are worsening.  Patient did see me for this ailment several months ago. The patient no trauma. The patient states no NSAIDs for management. No PCP evaluation of this condition as of yet. The patient states that prolonged walking and standing exacerbates and causes the symptoms. The patient does state mild associated swelling as well. Patient's Primary Care Provider is Rossana Ang MD.  Patient denies any recent physical therapy.  Patient is postop bilateral salpingo-oophorectomy approximately 1.5 months ago. Patient presents ambulatory with flat soled shoes.  States swelling of the foot.    Review of patient's allergies indicates:  No Known Allergies    Past Medical History:   Diagnosis Date    Anemia     Anxiety     Arthritis     knees    Cancer     ovarian    CHF (congestive heart failure)     Hx antineoplastic chemotherapy     last 6/2019    Hyperlipemia     Hypertension     Neck pain     Ovarian cancer 2019    CHEMO       Family History   Problem Relation Age of Onset    Anesthesia problems Other     Breast cancer Maternal Aunt     Breast cancer Paternal Aunt     Ovarian cancer Paternal Aunt     Colon cancer Brother     Thrombophilia Neg Hx        Social History     Socioeconomic History    Marital status:      Spouse name: Not on file    Number of children: Not on file    Years of education: Not on file    Highest education level: Not on file   Occupational History    Not on file   Social Needs    Financial  resource strain: Not on file    Food insecurity:     Worry: Not on file     Inability: Not on file    Transportation needs:     Medical: Not on file     Non-medical: Not on file   Tobacco Use    Smoking status: Former Smoker     Packs/day: 1.00     Years: 25.00     Pack years: 25.00     Last attempt to quit: 10/1/2018     Years since quittin.9    Smokeless tobacco: Never Used    Tobacco comment: States started quit 2 months ago after 30 years   Substance and Sexual Activity    Alcohol use: Never     Alcohol/week: 0.0 standard drinks     Frequency: Never     Comment: occasionally  No alcohol 72h prior to sx    Drug use: No    Sexual activity: Not Currently     Partners: Male     Comment: hyst; mut monog   Lifestyle    Physical activity:     Days per week: Not on file     Minutes per session: Not on file    Stress: Not on file   Relationships    Social connections:     Talks on phone: Not on file     Gets together: Not on file     Attends Voodoo service: Not on file     Active member of club or organization: Not on file     Attends meetings of clubs or organizations: Not on file     Relationship status: Not on file   Other Topics Concern    Not on file   Social History Narrative    Not on file       Past Surgical History:   Procedure Laterality Date    breast reduction  10/02/2018     SECTION      X 1    CYSTOSCOPY W/ URETERAL STENT PLACEMENT Right 2019    Procedure: CYSTOSCOPY, WITH URETERAL STENT INSERTION;  Surgeon: Timmy Santiago IV, MD;  Location: [facility];  Service: Urology;  Laterality: Right;    DILATION AND CURETTAGE OF UTERUS      HYSTERECTOMY      RALH for fibroids (still has ovaries)    INSERTION OF VENOUS ACCESS PORT Left 2019    Procedure: INSERTION, VENOUS ACCESS PORT;  Surgeon: Ulisses Monzon MD;  Location: [facility];  Service: General;  Laterality: Left;  Left internal jugular     LYSIS OF ADHESIONS OF URETER N/A 8/15/2019    Procedure: URETEROLYSIS;   "Surgeon: Ismael Juarez MD;  Location: [facility] OR;  Service: OB/GYN;  Laterality: N/A;    OMENTECTOMY N/A 8/15/2019    Procedure: OMENTECTOMY;  Surgeon: Ismael Juarez MD;  Location: [facility] OR;  Service: OB/GYN;  Laterality: N/A;    RETROGRADE PYELOGRAPHY Right 1/30/2019    Procedure: PYELOGRAM, RETROGRADE;  Surgeon: Timmy Santiago IV, MD;  Location: [facility] OR;  Service: Urology;  Laterality: Right;    ROBOT-ASSISTED LAPAROSCOPIC SALPINGO-OOPHORECTOMY USING DA TIA XI Bilateral 8/15/2019    Procedure: XI ROBOTIC SALPINGO-OOPHORECTOMY;  Surgeon: Ismael Juarez MD;  Location: [facility] OR;  Service: OB/GYN;  Laterality: Bilateral;    TOTAL REDUCTION MAMMOPLASTY  2018    TUBAL LIGATION         Review of Systems   Constitutional: Negative for chills, fatigue and fever.   HENT: Negative for hearing loss.    Eyes: Negative for photophobia and visual disturbance.   Respiratory: Negative for cough, chest tightness, shortness of breath and wheezing.    Cardiovascular: Negative for chest pain and palpitations.   Gastrointestinal: Negative for constipation, diarrhea, nausea and vomiting.   Endocrine: Negative for cold intolerance and heat intolerance.   Genitourinary: Negative for flank pain.   Musculoskeletal: Positive for gait problem. Negative for neck pain and neck stiffness.   Skin: Negative for wound.   Neurological: Negative for light-headedness, numbness and headaches.   Psychiatric/Behavioral: Negative for sleep disturbance.         Objective:   Resp 17   Ht 5' 7" (1.702 m)   Wt 106 kg (233 lb 9.2 oz)   BMI 36.58 kg/m²     Physical Exam    LOWER EXTREMITY PHYSICAL EXAMINATION    VASCULAR: Pulses are palpable to the B/L lower extremity. The right dorsalis pedis pulse is 2/4 and the posterior tibial pulse is 2/4. The left dorsalis pedis pulse is 2/4 and the posterior tibial pulse is 2/4. Hair growth is noted on the dorsal foot and digits. Proximal to distal, warm to warm. Capillary refill time is WNL at less than " 3s.    DERMATOLOGY: Skin is supple, dry and intact. No ecchymosis is noted. No hypertrophic skin formation. No erythema or cellulitis is noted.     ORTHOPEDIC: There is moderate collapsing pes planovalgus on the left foot. There is moderate tenderness to palpation of the navicular tuberosity on the left foot. There is severe edema noted along the course of the PT tendon on the left foot. There is moderate retro-malleolar edema (medial malleolus). There is mild pain to palpation at the spring ligament and the deltoid ligaments. There is no apparent pains to palpation of the medial malleolus.  Equinus contracture is noted.  No pain with palpation and/or range of motion of the ankle joint.  There is no crepitus noted with range of motion of the ankle joint. The ankle is not in valgus.  There is mild limitation to ROM of the STJ and the MTJ. The hindfoot is in valgus. Upon standing, there is moderate royer-talar subluxation noted on the left foot. There is mild forefoot/midfoot abduction on the hindfoot.  RCSP is neutral. The deformity is supple. The patient is able to double heel rise, but has difficulties with single heel rise on the left foot.  Palpable dorsal midfoot arthropathy is noted. No crepitus noted to the dorsal midfoot. Gait pattern is antalgic at this time.     NEUROLOGY: Protective sensation is intact via 5.07 [place] monofilament. Proprioception is intact. Sensation to light touch is intact. Upon palpation of the interspaces, there are no neurological sensations stated that radiate proximal or distal. Upon compression of the metatarsal heads from medial to lateral, no neurological sensations or symptoms are stated.    Assessment:     1. Posterior tibial tendon dysfunction, left    2. Pain in left ankle and joints of left foot    3. Contracture, left ankle    4. Acquired pes planus, left    5. Accessory navicular bone of left foot    6. Osteoarthritis of left ankle or foot        Plan:      Posterior tibial tendon dysfunction, left  -     Ambulatory Referral to Physical/Occupational Therapy  -     ibuprofen (ADVIL,MOTRIN) 800 MG tablet; Take 1 tablet (800 mg total) by mouth 2 (two) times daily.  Dispense: 60 tablet; Refill: 1    Pain in left ankle and joints of left foot  -     Ambulatory Referral to Physical/Occupational Therapy  -     ibuprofen (ADVIL,MOTRIN) 800 MG tablet; Take 1 tablet (800 mg total) by mouth 2 (two) times daily.  Dispense: 60 tablet; Refill: 1    Contracture, left ankle  -     Ambulatory Referral to Physical/Occupational Therapy  -     ibuprofen (ADVIL,MOTRIN) 800 MG tablet; Take 1 tablet (800 mg total) by mouth 2 (two) times daily.  Dispense: 60 tablet; Refill: 1    Acquired pes planus, left  -     Ambulatory Referral to Physical/Occupational Therapy  -     ibuprofen (ADVIL,MOTRIN) 800 MG tablet; Take 1 tablet (800 mg total) by mouth 2 (two) times daily.  Dispense: 60 tablet; Refill: 1    Accessory navicular bone of left foot  -     Ambulatory Referral to Physical/Occupational Therapy  -     ibuprofen (ADVIL,MOTRIN) 800 MG tablet; Take 1 tablet (800 mg total) by mouth 2 (two) times daily.  Dispense: 60 tablet; Refill: 1    Osteoarthritis of left ankle or foot  -     Ambulatory Referral to Physical/Occupational Therapy  -     ibuprofen (ADVIL,MOTRIN) 800 MG tablet; Take 1 tablet (800 mg total) by mouth 2 (two) times daily.  Dispense: 60 tablet; Refill: 1        Thorough discussion is had with the patient today, concerning the diagnosis, its etiology, and the treatment algorithm at present.  XRAYS are reviewed in detail with the patient. All questions and concerns regarding findings and its/their implications are outlined and discussed.  Patient will need oral NSAID for duration of several weeks.  Past medical history and labs are reviewed.  Patient will also need ambulate with a CAM Walker for duration of 2+ weeks.  Prescription is given to start outpatient physical therapy in  approximately 2 weeks.  Follow up in approximately 5-6 weeks.  Patient may benefit from surgical intervention or bracing ([place]).         Future Appointments   Date Time Provider Department Center   10/2/2019 11:20 AM Oscar Mckeon MD [facility] [place]   11/6/2019  1:30 PM Wilner Green MD [facility]

## 2019-10-02 ENCOUNTER — OFFICE VISIT (OUTPATIENT)
Dept: HEMATOLOGY/ONCOLOGY | Facility: CLINIC | Age: 64
End: 2019-10-02
Payer: MEDICAID

## 2019-10-02 VITALS
DIASTOLIC BLOOD PRESSURE: 81 MMHG | WEIGHT: 237.44 LBS | HEART RATE: 81 BPM | HEIGHT: 67 IN | OXYGEN SATURATION: 99 % | BODY MASS INDEX: 37.27 KG/M2 | RESPIRATION RATE: 18 BRPM | SYSTOLIC BLOOD PRESSURE: 146 MMHG | TEMPERATURE: 97 F

## 2019-10-02 DIAGNOSIS — C56.9 MALIGNANT NEOPLASM OF OVARY, UNSPECIFIED LATERALITY: Primary | ICD-10-CM

## 2019-10-02 PROCEDURE — 99215 PR OFFICE/OUTPT VISIT, EST, LEVL V, 40-54 MIN: ICD-10-PCS | Mod: S$PBB,,, | Performed by: INTERNAL MEDICINE

## 2019-10-02 PROCEDURE — 99999 PR PBB SHADOW E&M-EST. PATIENT-LVL III: CPT | Mod: PBBFAC,,, | Performed by: INTERNAL MEDICINE

## 2019-10-02 PROCEDURE — 99999 PR PBB SHADOW E&M-EST. PATIENT-LVL III: ICD-10-PCS | Mod: PBBFAC,,, | Performed by: INTERNAL MEDICINE

## 2019-10-02 PROCEDURE — 99215 OFFICE O/P EST HI 40 MIN: CPT | Mod: S$PBB,,, | Performed by: INTERNAL MEDICINE

## 2019-10-02 PROCEDURE — 99213 OFFICE O/P EST LOW 20 MIN: CPT | Mod: PBBFAC | Performed by: INTERNAL MEDICINE

## 2019-10-02 RX ORDER — DICLOFENAC SODIUM 10 MG/G
GEL TOPICAL
Qty: 1 TUBE | Refills: 0 | Status: SHIPPED | OUTPATIENT
Start: 2019-10-02 | End: 2019-10-29 | Stop reason: SDUPTHER

## 2019-10-02 RX ORDER — EPINEPHRINE 0.3 MG/.3ML
0.3 INJECTION SUBCUTANEOUS ONCE AS NEEDED
Status: CANCELLED | OUTPATIENT
Start: 2019-10-08

## 2019-10-02 RX ORDER — METHYLPREDNISOLONE SOD SUCC 125 MG
125 VIAL (EA) INJECTION ONCE AS NEEDED
Status: CANCELLED | OUTPATIENT
Start: 2019-10-08

## 2019-10-02 RX ORDER — DIPHENHYDRAMINE HYDROCHLORIDE 50 MG/ML
25 INJECTION INTRAMUSCULAR; INTRAVENOUS EVERY 10 MIN PRN
Status: CANCELLED | OUTPATIENT
Start: 2019-10-08

## 2019-10-02 RX ORDER — SODIUM CHLORIDE 0.9 % (FLUSH) 0.9 %
10 SYRINGE (ML) INJECTION
Status: CANCELLED | OUTPATIENT
Start: 2019-10-08

## 2019-10-02 RX ORDER — HEPARIN 100 UNIT/ML
500 SYRINGE INTRAVENOUS
Status: CANCELLED | OUTPATIENT
Start: 2019-10-08

## 2019-10-02 NOTE — PROGRESS NOTES
Subjective:       Patient ID: Casie Gaines is a 63 y.o. female.    Chief Complaint: Malignant neoplasm of ovary, unspecified laterality [C56.9]  HPI: We have an opportunity to see Ms. Casie Gaines in Hematology Oncology clinic at [facility] on 10/02/2019.  Ms. Casie Gaines is a 63 y.o. woman with peritoneal carcinomatosis with malignant right pleural effusion.  Final pathology is consistent with ovarian primary with  2740.  PET scan demonstrates multiple omental and peritoneal avid masses consistent with peritoneal carcinomatosis, extensive retroperitoneal and mediastinal lymphadenopathy, masslike structure in the right iliac fossa likely representing ovarian mass.  Patient has been evaluated by GYN Oncology Dr. Juarez and MD Green with recommendations for carboplatin/Taxol/Avastin.      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  S/p 6 cycle avastin taxol carboplatin.  Has great AL on scan after 6 cycles.    Had cytoreductive surgery in August, pathology showed CR.          Peritoneal carcinomatosis    1/26/2019 Initial Diagnosis     Peritoneal carcinomatosis      2/15/2019 - 7/8/2019 Chemotherapy     Treatment Summary   Plan Name: OP GYN PACLITAXEL CARBOPLATIN (AUC 6) Q3W  Treatment Goal: Palliative  Status: Inactive  Start Date: 2/28/2019  End Date: 6/18/2019  Provider: Will Trevizo Jr., MD  Chemotherapy: bevacizumab (AVASTIN) 15 mg/kg = 1,600 mg in sodium chloride 0.9% 100 mL chemo infusion, 15 mg/kg = 1,600 mg (100 % of original dose 15 mg/kg), Intravenous, Clinic/HOD 1 time, 6 of 6 cycles  Dose modification: 15 mg/kg (original dose 15 mg/kg, Cycle 1)  Administration: 1,600 mg (2/28/2019), 1,600 mg (3/21/2019), 1,600 mg (4/11/2019), 1,600 mg (5/7/2019), 1,600 mg (5/28/2019), 1,510 mg (6/18/2019)  CARBOplatin (PARAPLATIN) 870 mg in sodium chloride 0.9% 337 mL chemo infusion, 870 mg (100 % of original dose 868.8 mg), Intravenous, Clinic/HOD 1  time, 6 of 6 cycles  Dose modification:   (original dose 868.8 mg, Cycle 1)  Administration: 870 mg (2/28/2019), 870 mg (3/21/2019), 900 mg (4/11/2019), 870 mg (5/7/2019), 660 mg (5/28/2019), 690 mg (6/18/2019)  PACLitaxel (TAXOL) 175 mg/m2 = 396 mg in sodium chloride 0.9% 566 mL chemo infusion, 175 mg/m2 = 396 mg, Intravenous, Clinic/HOD 1 time, 6 of 6 cycles  Dose modification: 140 mg/m2 (80 % of original dose 175 mg/m2, Cycle 5)  Administration: 396 mg (2/28/2019), 396 mg (3/21/2019), 396 mg (4/11/2019), 396 mg (5/7/2019), 318 mg (5/28/2019), 306 mg (6/18/2019)      10/8/2019 -  Chemotherapy     Treatment Summary   Plan Name: OP BEVACIZUMAB Q3W   Treatment Goal: Maintenance  Status: Active  Start Date: 10/8/2019 (Planned)  End Date: 9/8/2020 (Planned)  Provider: Oscar Mckeon MD  Chemotherapy: bevacizumab (AVASTIN) 15 mg/kg = 1,615 mg in sodium chloride 0.9% 100 mL chemo infusion, 15 mg/kg = 1,615 mg, Intravenous, Clinic/HOD 1 time, 0 of 17 cycles        Malignant neoplasm of right ovary    2/15/2019 Initial Diagnosis     Malignant neoplasm of right ovary      2/15/2019 - 7/8/2019 Chemotherapy     Treatment Summary   Plan Name: OP GYN PACLITAXEL CARBOPLATIN (AUC 6) Q3W  Treatment Goal: Palliative  Status: Inactive  Start Date: 2/28/2019  End Date: 6/18/2019  Provider: Will Trevizo Jr., MD  Chemotherapy: bevacizumab (AVASTIN) 15 mg/kg = 1,600 mg in sodium chloride 0.9% 100 mL chemo infusion, 15 mg/kg = 1,600 mg (100 % of original dose 15 mg/kg), Intravenous, Clinic/HOD 1 time, 6 of 6 cycles  Dose modification: 15 mg/kg (original dose 15 mg/kg, Cycle 1)  Administration: 1,600 mg (2/28/2019), 1,600 mg (3/21/2019), 1,600 mg (4/11/2019), 1,600 mg (5/7/2019), 1,600 mg (5/28/2019), 1,510 mg (6/18/2019)  CARBOplatin (PARAPLATIN) 870 mg in sodium chloride 0.9% 337 mL chemo infusion, 870 mg (100 % of original dose 868.8 mg), Intravenous, Clinic/[facility] 1 time, 6 of 6 cycles  Dose modification:   (original dose 868.8 mg,  Cycle 1)  Administration: 870 mg (2/28/2019), 870 mg (3/21/2019), 900 mg (4/11/2019), 870 mg (5/7/2019), 660 mg (5/28/2019), 690 mg (6/18/2019)  PACLitaxel (TAXOL) 175 mg/m2 = 396 mg in sodium chloride 0.9% 566 mL chemo infusion, 175 mg/m2 = 396 mg, Intravenous, Clinic/HOD 1 time, 6 of 6 cycles  Dose modification: 140 mg/m2 (80 % of original dose 175 mg/m2, Cycle 5)  Administration: 396 mg (2/28/2019), 396 mg (3/21/2019), 396 mg (4/11/2019), 396 mg (5/7/2019), 318 mg (5/28/2019), 306 mg (6/18/2019)      10/8/2019 -  Chemotherapy     Treatment Summary   Plan Name: OP BEVACIZUMAB Q3W   Treatment Goal: Maintenance  Status: Active  Start Date: 10/8/2019 (Planned)  End Date: 9/8/2020 (Planned)  Provider: Oscar Mckeon MD  Chemotherapy: bevacizumab (AVASTIN) 15 mg/kg = 1,615 mg in sodium chloride 0.9% 100 mL chemo infusion, 15 mg/kg = 1,615 mg, Intravenous, Clinic/HOD 1 time, 0 of 17 cycles        Ovarian cancer    2/18/2019 Initial Diagnosis     Ovarian cancer      10/8/2019 -  Chemotherapy     Treatment Summary   Plan Name: OP BEVACIZUMAB Q3W   Treatment Goal: Maintenance  Status: Active  Start Date: 10/8/2019 (Planned)  End Date: 9/8/2020 (Planned)  Provider: Oscar Mckeon MD  Chemotherapy: bevacizumab (AVASTIN) 15 mg/kg = 1,615 mg in sodium chloride 0.9% 100 mL chemo infusion, 15 mg/kg = 1,615 mg, Intravenous, Clinic/HOD 1 time, 0 of 17 cycles        Ovarian cancer, bilateral    8/15/2019 Initial Diagnosis     Ovarian cancer, bilateral      10/8/2019 -  Chemotherapy     Treatment Summary   Plan Name: OP BEVACIZUMAB Q3W   Treatment Goal: Maintenance  Status: Active  Start Date: 10/8/2019 (Planned)  End Date: 9/8/2020 (Planned)  Provider: Oscar Mckeon MD  Chemotherapy: bevacizumab (AVASTIN) 15 mg/kg = 1,615 mg in sodium chloride 0.9% 100 mL chemo infusion, 15 mg/kg = 1,615 mg, Intravenous, Clinic/HOD 1 time, 0 of 17 cycles       Past Medical History:   Diagnosis Date    Anemia     Anxiety     Arthritis     knees     Cancer     ovarian    CHF (congestive heart failure)     Hx antineoplastic chemotherapy     last 2019    Hyperlipemia     Hypertension     Neck pain     Ovarian cancer 2019    CHEMO     Family History   Problem Relation Age of Onset    Anesthesia problems Other     Breast cancer Maternal Aunt     Breast cancer Paternal Aunt     Ovarian cancer Paternal Aunt     Colon cancer Brother     Thrombophilia Neg Hx      Social History     Socioeconomic History    Marital status:      Spouse name: Not on file    Number of children: Not on file    Years of education: Not on file    Highest education level: Not on file   Occupational History    Not on file   Social Needs    Financial resource strain: Not on file    Food insecurity:     Worry: Not on file     Inability: Not on file    Transportation needs:     Medical: Not on file     Non-medical: Not on file   Tobacco Use    Smoking status: Former Smoker     Packs/day: 1.00     Years: 25.00     Pack years: 25.00     Last attempt to quit: 10/1/2018     Years since quittin.0    Smokeless tobacco: Never Used    Tobacco comment: States started quit 2 months ago after 30 years   Substance and Sexual Activity    Alcohol use: Never     Alcohol/week: 0.0 standard drinks     Frequency: Never     Comment: occasionally  No alcohol 72h prior to sx    Drug use: No    Sexual activity: Not Currently     Partners: Male     Comment: hyst; mut monog   Lifestyle    Physical activity:     Days per week: Not on file     Minutes per session: Not on file    Stress: Not on file   Relationships    Social connections:     Talks on phone: Not on file     Gets together: Not on file     Attends Congregational service: Not on file     Active member of club or organization: Not on file     Attends meetings of clubs or organizations: Not on file     Relationship status: Not on file   Other Topics Concern    Not on file   Social History Narrative    Not on file     Past  Surgical History:   Procedure Laterality Date    breast reduction  10/02/2018     SECTION      X 1    CYSTOSCOPY W/ URETERAL STENT PLACEMENT Right 2019    Procedure: CYSTOSCOPY, WITH URETERAL STENT INSERTION;  Surgeon: Timmy Santiago IV, MD;  Location: [facility] OR;  Service: Urology;  Laterality: Right;    DILATION AND CURETTAGE OF UTERUS      HYSTERECTOMY      RALH for fibroids (still has ovaries)    INSERTION OF VENOUS ACCESS PORT Left 2019    Procedure: INSERTION, VENOUS ACCESS PORT;  Surgeon: Ulisses Monzon MD;  Location: [facility] OR;  Service: General;  Laterality: Left;  Left internal jugular     LYSIS OF ADHESIONS OF URETER N/A 8/15/2019    Procedure: URETEROLYSIS;  Surgeon: Ismael Juarez MD;  Location: [facility] OR;  Service: OB/GYN;  Laterality: N/A;    OMENTECTOMY N/A 8/15/2019    Procedure: OMENTECTOMY;  Surgeon: Ismael Juarez MD;  Location: [facility] OR;  Service: OB/GYN;  Laterality: N/A;    RETROGRADE PYELOGRAPHY Right 2019    Procedure: PYELOGRAM, RETROGRADE;  Surgeon: Timmy Santiago IV, MD;  Location: [facility] OR;  Service: Urology;  Laterality: Right;    ROBOT-ASSISTED LAPAROSCOPIC SALPINGO-OOPHORECTOMY USING DA TIA XI Bilateral 8/15/2019    Procedure: XI ROBOTIC SALPINGO-OOPHORECTOMY;  Surgeon: Ismael Juarez MD;  Location: [facility] OR;  Service: OB/GYN;  Laterality: Bilateral;    TOTAL REDUCTION MAMMOPLASTY  2018    TUBAL LIGATION       Current Outpatient Medications   Medication Sig Dispense Refill    albuterol 90 mcg/actuation inhaler Inhale 2 puffs into the lungs every 4 (four) hours as needed for Wheezing. Rescue 18 g 0    biotin 1 mg tablet Take 1,000 mcg by mouth once daily.       ibuprofen (ADVIL,MOTRIN) 800 MG tablet Take 1 tablet (800 mg total) by mouth 2 (two) times daily. 60 tablet 1    multivitamin with minerals tablet Take 1 tablet by mouth once daily.       olmesartan-hydrochlorothiazide (BENICAR HCT) 40-12.5 mg Tab Take 1 tablet by mouth once daily. 90 tablet  0    rosuvastatin (CRESTOR) 10 MG tablet Take 1 tablet (10 mg total) by mouth once daily. 90 tablet 1    tolterodine (DETROL) 2 MG Tab Take 1 tablet (2 mg total) by mouth 2 (two) times daily. 60 tablet 11    zinc gluconate 50 mg tablet Take 50 mg by mouth once daily.      cefadroxil (DURICEF) 500 MG Cap 1  capsule  two times a day (Patient not taking: Reported on 10/2/2019) 30 capsule 0    dexamethasone (DECADRON) 4 MG Tab Decadron 20 mg (5 tablets) by mouth 12 and 6 hr prior to chemotherapy (Patient not taking: Reported on 10/2/2019) 20 tablet 3    diclofenac sodium (VOLTAREN) 1 % Gel Apply once a day to foot 1 Tube 0    HYDROcodone-acetaminophen (NORCO) 5-325 mg per tablet Take 1 tablet by mouth every 6 (six) hours as needed. (Patient not taking: Reported on 10/2/2019) 20 tablet 0    lidocaine-prilocaine (EMLA) cream Apply topically as needed. (Patient not taking: Reported on 10/2/2019) 30 g 1    methen-sod phos-meth blue-hyos (UROGESIC-BLUE) 81.6-40.8-0.12 mg Tab Take 1 tablet by mouth 4 (four) times daily.      methen-sod phos-meth blue-hyos (UROGESIC-BLUE) 81.6-40.8-0.12 mg Tab Take 1 tablet by mouth four times daily for 10 days (Patient not taking: Reported on 10/2/2019) 40 tablet 4    ondansetron (ZOFRAN-ODT) 8 MG TbDL Take 1 tablet (8 mg total) by mouth every 8 (eight) hours as needed. (Patient not taking: Reported on 10/2/2019) 30 tablet 5    oxyCODONE-acetaminophen (PERCOCET)  mg per tablet Take 1 tablet by mouth every 8 (eight) hours as needed. (Patient not taking: Reported on 10/2/2019) 20 tablet 0    prochlorperazine (COMPAZINE) 10 MG tablet Take 1 tablet (10 mg total) by mouth every 6 (six) hours as needed. (Patient not taking: Reported on 10/2/2019) 30 tablet 5     No current facility-administered medications for this visit.        Labs:  Lab Results   Component Value Date    WBC 6.51 08/08/2019    HGB 10.4 (L) 08/08/2019    HCT 33.7 (L) 08/08/2019    MCV 98 08/08/2019      08/08/2019     BMP  Lab Results   Component Value Date     06/17/2019    K 4.2 06/17/2019     06/17/2019    CO2 28 06/17/2019    BUN 14 06/17/2019    CREATININE 0.8 06/17/2019    CALCIUM 10.1 06/17/2019    ANIONGAP 9 06/17/2019    ESTGFRAFRICA >60 06/17/2019    EGFRNONAA >60 06/17/2019     Lab Results   Component Value Date    ALT 14 06/17/2019    AST 18 06/17/2019    ALKPHOS 104 06/17/2019    BILITOT 0.6 06/17/2019       No results found for: IRON, TIBC, FERRITIN, SATURATEDIRO  No results found for: NLWFEENM05  No results found for: FOLATE  Lab Results   Component Value Date    TSH 1.869 04/19/2013       I have reviewed the radiology reports and examined the scan/xray images.    Review of Systems   Constitutional: Negative.    HENT: Negative.    Eyes: Negative.    Respiratory: Negative.    Cardiovascular: Negative.    Gastrointestinal: Negative.    Endocrine: Negative.    Genitourinary: Negative.    Musculoskeletal: Negative.    Skin: Negative.    Allergic/Immunologic: Negative.    Neurological: Negative.    Hematological: Negative.    Psychiatric/Behavioral: Negative.      ECOG SCORE    0 - Fully active-able to carry on all pre-disease performance without restriction            Objective:     Vitals:    10/02/19 1155   BP: (!) 146/81   Pulse: 81   Resp: 18   Temp: 97.1 °F (36.2 °C)   Body mass index is 37.19 kg/m².  Physical Exam   Constitutional: She is oriented to person, place, and time. She appears well-developed and well-nourished.   HENT:   Head: Normocephalic and atraumatic.   Eyes: Conjunctivae and EOM are normal.   Neck: Normal range of motion. Neck supple.   Cardiovascular: Normal rate and regular rhythm.   Pulmonary/Chest: Effort normal and breath sounds normal.   Abdominal: Soft. Bowel sounds are normal.   Musculoskeletal: Normal range of motion.   Neurological: She is alert and oriented to person, place, and time.   Skin: Skin is warm and dry.   Psychiatric: She has a normal mood and  affect. Her behavior is normal. Judgment and thought content normal.   Nursing note and vitals reviewed.        Assessment:      1. Malignant neoplasm of ovary, unspecified laterality           Plan:     Malignant neoplasm of ovary, unspecified laterality    We discussed starting maintenance Avastin 15 mg/kg every 3 weeks for 22 cycles per  which showed improved PFS, and catergory 2B recommended per NCCN guidelines.    We discussed the benefit and risk of treatment.  Goal of treatment is for curative intent with improved chance of cancer free survival and overall survival. Risk of treatment includes but is not limited to proteinuria, htn, bowel perforation.       -     CBC auto differential; Future; Expected date: 10/08/2019  -     Comprehensive metabolic panel; Future; Expected date: 10/08/2019  -     Urinalysis; Future; Expected date: 10/08/2019  -     CBC auto differential; Future; Expected date: 10/29/2019  -     Comprehensive metabolic panel; Future; Expected date: 10/29/2019  -     Urinalysis; Future; Expected date: 10/29/2019    Tendonitis  -     diclofenac sodium (VOLTAREN) 1 % Gel; Apply once a day to foot  Dispense: 1 Tube; Refill: 0

## 2019-10-04 ENCOUNTER — TELEPHONE (OUTPATIENT)
Dept: PHARMACY | Facility: CLINIC | Age: 64
End: 2019-10-04

## 2019-10-04 NOTE — TELEPHONE ENCOUNTER
Reason for call:  Notify patient PA approved for Diclofenac Sodium 1% Gel resulting in a $0.00 copayment.    PA Information:  [facility]  [phone]  PA Case ID # [id number]  pa approval dates: 9/4/19-10/3/2020  PA Approved for Diclofenac Sodium 1% Gel #100 Grams per 25 days    Thank You!   Maude Law CPhT, B.A  Patient Care Advocate   [facility]  [email or web address]  Phone: [phone] Ext [phone]  Fax: [phone]

## 2019-10-09 ENCOUNTER — INFUSION (OUTPATIENT)
Dept: INFUSION THERAPY | Facility: HOSPITAL | Age: 64
End: 2019-10-09
Attending: INTERNAL MEDICINE
Payer: MEDICAID

## 2019-10-09 ENCOUNTER — LAB VISIT (OUTPATIENT)
Dept: LAB | Facility: HOSPITAL | Age: 64
End: 2019-10-09
Attending: INTERNAL MEDICINE
Payer: MEDICAID

## 2019-10-09 VITALS
SYSTOLIC BLOOD PRESSURE: 147 MMHG | OXYGEN SATURATION: 95 % | HEART RATE: 72 BPM | RESPIRATION RATE: 18 BRPM | HEIGHT: 67 IN | BODY MASS INDEX: 37.27 KG/M2 | WEIGHT: 237.44 LBS | TEMPERATURE: 98 F | DIASTOLIC BLOOD PRESSURE: 82 MMHG

## 2019-10-09 DIAGNOSIS — C78.6 PERITONEAL CARCINOMATOSIS: ICD-10-CM

## 2019-10-09 DIAGNOSIS — C56.9 MALIGNANT NEOPLASM OF OVARY, UNSPECIFIED LATERALITY: ICD-10-CM

## 2019-10-09 DIAGNOSIS — C56.1 MALIGNANT NEOPLASM OF RIGHT OVARY: ICD-10-CM

## 2019-10-09 DIAGNOSIS — C56.3 OVARIAN CANCER, BILATERAL: Primary | ICD-10-CM

## 2019-10-09 LAB
ALBUMIN SERPL BCP-MCNC: 3.6 G/DL (ref 3.5–5.2)
ALP SERPL-CCNC: 107 U/L (ref 55–135)
ALT SERPL W/O P-5'-P-CCNC: 39 U/L (ref 10–44)
ANION GAP SERPL CALC-SCNC: 9 MMOL/L (ref 8–16)
AST SERPL-CCNC: 33 U/L (ref 10–40)
BASOPHILS # BLD AUTO: 0.03 K/UL (ref 0–0.2)
BASOPHILS NFR BLD: 0.5 % (ref 0–1.9)
BILIRUB SERPL-MCNC: 0.5 MG/DL (ref 0.1–1)
BUN SERPL-MCNC: 14 MG/DL (ref 8–23)
CALCIUM SERPL-MCNC: 9.7 MG/DL (ref 8.7–10.5)
CHLORIDE SERPL-SCNC: 106 MMOL/L (ref 95–110)
CO2 SERPL-SCNC: 28 MMOL/L (ref 23–29)
CREAT SERPL-MCNC: 0.8 MG/DL (ref 0.5–1.4)
DIFFERENTIAL METHOD: ABNORMAL
EOSINOPHIL # BLD AUTO: 0.1 K/UL (ref 0–0.5)
EOSINOPHIL NFR BLD: 2.1 % (ref 0–8)
ERYTHROCYTE [DISTWIDTH] IN BLOOD BY AUTOMATED COUNT: 16.2 % (ref 11.5–14.5)
EST. GFR  (AFRICAN AMERICAN): >60 ML/MIN/1.73 M^2
EST. GFR  (NON AFRICAN AMERICAN): >60 ML/MIN/1.73 M^2
GLUCOSE SERPL-MCNC: 98 MG/DL (ref 70–110)
HCT VFR BLD AUTO: 35.6 % (ref 37–48.5)
HGB BLD-MCNC: 10.8 G/DL (ref 12–16)
IMM GRANULOCYTES # BLD AUTO: 0.02 K/UL (ref 0–0.04)
IMM GRANULOCYTES NFR BLD AUTO: 0.3 % (ref 0–0.5)
LYMPHOCYTES # BLD AUTO: 2 K/UL (ref 1–4.8)
LYMPHOCYTES NFR BLD: 30.7 % (ref 18–48)
MCH RBC QN AUTO: 28.6 PG (ref 27–31)
MCHC RBC AUTO-ENTMCNC: 30.3 G/DL (ref 32–36)
MCV RBC AUTO: 94 FL (ref 82–98)
MONOCYTES # BLD AUTO: 0.5 K/UL (ref 0.3–1)
MONOCYTES NFR BLD: 6.8 % (ref 4–15)
NEUTROPHILS # BLD AUTO: 4 K/UL (ref 1.8–7.7)
NEUTROPHILS NFR BLD: 59.6 % (ref 38–73)
NRBC BLD-RTO: 0 /100 WBC
PLATELET # BLD AUTO: 218 K/UL (ref 150–350)
PMV BLD AUTO: 10.2 FL (ref 9.2–12.9)
POTASSIUM SERPL-SCNC: 3.6 MMOL/L (ref 3.5–5.1)
PROT SERPL-MCNC: 6.4 G/DL (ref 6–8.4)
RBC # BLD AUTO: 3.78 M/UL (ref 4–5.4)
SODIUM SERPL-SCNC: 143 MMOL/L (ref 136–145)
WBC # BLD AUTO: 6.64 K/UL (ref 3.9–12.7)

## 2019-10-09 PROCEDURE — 63600175 PHARM REV CODE 636 W HCPCS: Mod: JG | Performed by: INTERNAL MEDICINE

## 2019-10-09 PROCEDURE — 96413 CHEMO IV INFUSION 1 HR: CPT

## 2019-10-09 PROCEDURE — 80053 COMPREHEN METABOLIC PANEL: CPT

## 2019-10-09 PROCEDURE — 36415 COLL VENOUS BLD VENIPUNCTURE: CPT

## 2019-10-09 RX ORDER — HEPARIN 100 UNIT/ML
500 SYRINGE INTRAVENOUS
Status: DISCONTINUED | OUTPATIENT
Start: 2019-10-09 | End: 2019-10-09 | Stop reason: HOSPADM

## 2019-10-09 RX ADMIN — HEPARIN 500 UNITS: 100 SYRINGE at 03:10

## 2019-10-09 RX ADMIN — BEVACIZUMAB 1615 MG: 400 INJECTION, SOLUTION INTRAVENOUS at 02:10

## 2019-10-09 NOTE — NURSING
Pt tolerated Avastin well. No adverse reaction noted. Pt education reinforced on chemo regimen, side effects, what to expect, and when to call . Pt verbalized understanding. I reviewed pt calendar w/ pt and understanding verbalized. Left PAC deaccessed and flushed w/ NS and heparin per protocol.

## 2019-10-09 NOTE — DISCHARGE INSTRUCTIONS
[facility] [place]  [address]  [address]  [phone] phone     [phone] fax  Hours of Operation: Monday- Friday 8:00am- 5:00pm  After hours phone  [phone]  Hematology / Oncology Physicians on call      Dr. Varinder Mckeon      Please call with any concerns regarding your appointment today.

## 2019-10-10 ENCOUNTER — PATIENT MESSAGE (OUTPATIENT)
Dept: GYNECOLOGIC ONCOLOGY | Facility: CLINIC | Age: 64
End: 2019-10-10

## 2019-10-10 ENCOUNTER — TELEPHONE (OUTPATIENT)
Dept: GYNECOLOGIC ONCOLOGY | Facility: CLINIC | Age: 64
End: 2019-10-10

## 2019-10-21 ENCOUNTER — PATIENT MESSAGE (OUTPATIENT)
Dept: INTERNAL MEDICINE | Facility: CLINIC | Age: 64
End: 2019-10-21

## 2019-10-21 DIAGNOSIS — I10 ESSENTIAL HYPERTENSION: ICD-10-CM

## 2019-10-21 RX ORDER — OLMESARTAN MEDOXOMIL AND HYDROCHLOROTHIAZIDE 40/12.5 40; 12.5 MG/1; MG/1
1 TABLET ORAL DAILY
Qty: 90 TABLET | Refills: 0 | Status: CANCELLED | OUTPATIENT
Start: 2019-10-21

## 2019-10-22 RX ORDER — OLMESARTAN MEDOXOMIL AND HYDROCHLOROTHIAZIDE 40/12.5 40; 12.5 MG/1; MG/1
1 TABLET ORAL DAILY
Qty: 90 TABLET | Refills: 0 | Status: SHIPPED | OUTPATIENT
Start: 2019-10-22 | End: 2020-02-21

## 2019-10-23 ENCOUNTER — TELEPHONE (OUTPATIENT)
Dept: PHARMACY | Facility: CLINIC | Age: 64
End: 2019-10-23

## 2019-10-29 ENCOUNTER — PATIENT MESSAGE (OUTPATIENT)
Dept: INTERNAL MEDICINE | Facility: CLINIC | Age: 64
End: 2019-10-29

## 2019-10-29 ENCOUNTER — OFFICE VISIT (OUTPATIENT)
Dept: HEMATOLOGY/ONCOLOGY | Facility: CLINIC | Age: 64
End: 2019-10-29
Payer: MEDICAID

## 2019-10-29 ENCOUNTER — INFUSION (OUTPATIENT)
Dept: INFUSION THERAPY | Facility: HOSPITAL | Age: 64
End: 2019-10-29
Attending: INTERNAL MEDICINE
Payer: MEDICAID

## 2019-10-29 VITALS
BODY MASS INDEX: 38.47 KG/M2 | TEMPERATURE: 98 F | WEIGHT: 245.13 LBS | HEART RATE: 75 BPM | SYSTOLIC BLOOD PRESSURE: 160 MMHG | OXYGEN SATURATION: 98 % | HEIGHT: 67 IN | DIASTOLIC BLOOD PRESSURE: 78 MMHG

## 2019-10-29 VITALS — SYSTOLIC BLOOD PRESSURE: 182 MMHG | DIASTOLIC BLOOD PRESSURE: 89 MMHG | HEART RATE: 72 BPM

## 2019-10-29 DIAGNOSIS — C78.6 PERITONEAL CARCINOMATOSIS: ICD-10-CM

## 2019-10-29 DIAGNOSIS — C56.9 MALIGNANT NEOPLASM OF OVARY, UNSPECIFIED LATERALITY: Primary | ICD-10-CM

## 2019-10-29 DIAGNOSIS — C56.3 OVARIAN CANCER, BILATERAL: Primary | ICD-10-CM

## 2019-10-29 DIAGNOSIS — C56.9 MALIGNANT NEOPLASM OF OVARY, UNSPECIFIED LATERALITY: ICD-10-CM

## 2019-10-29 DIAGNOSIS — C56.1 MALIGNANT NEOPLASM OF RIGHT OVARY: ICD-10-CM

## 2019-10-29 PROCEDURE — 99999 PR PBB SHADOW E&M-EST. PATIENT-LVL III: ICD-10-PCS | Mod: PBBFAC,,, | Performed by: INTERNAL MEDICINE

## 2019-10-29 PROCEDURE — 99213 OFFICE O/P EST LOW 20 MIN: CPT | Mod: PBBFAC,25 | Performed by: INTERNAL MEDICINE

## 2019-10-29 PROCEDURE — 99215 PR OFFICE/OUTPT VISIT, EST, LEVL V, 40-54 MIN: ICD-10-PCS | Mod: 25,S$PBB,, | Performed by: INTERNAL MEDICINE

## 2019-10-29 PROCEDURE — 96413 CHEMO IV INFUSION 1 HR: CPT

## 2019-10-29 PROCEDURE — 99215 OFFICE O/P EST HI 40 MIN: CPT | Mod: 25,S$PBB,, | Performed by: INTERNAL MEDICINE

## 2019-10-29 PROCEDURE — 63600175 PHARM REV CODE 636 W HCPCS: Mod: JG | Performed by: INTERNAL MEDICINE

## 2019-10-29 PROCEDURE — 99999 PR PBB SHADOW E&M-EST. PATIENT-LVL III: CPT | Mod: PBBFAC,,, | Performed by: INTERNAL MEDICINE

## 2019-10-29 RX ORDER — HEPARIN 100 UNIT/ML
500 SYRINGE INTRAVENOUS
Status: DISCONTINUED | OUTPATIENT
Start: 2019-10-29 | End: 2019-10-29 | Stop reason: HOSPADM

## 2019-10-29 RX ORDER — ALPRAZOLAM 0.25 MG/1
0.25 TABLET ORAL 3 TIMES DAILY PRN
Qty: 60 TABLET | Refills: 0 | Status: SHIPPED | OUTPATIENT
Start: 2019-10-29 | End: 2019-12-31 | Stop reason: SDUPTHER

## 2019-10-29 RX ORDER — EPINEPHRINE 0.3 MG/.3ML
0.3 INJECTION SUBCUTANEOUS ONCE AS NEEDED
Status: CANCELLED | OUTPATIENT
Start: 2019-10-29

## 2019-10-29 RX ORDER — DIPHENHYDRAMINE HYDROCHLORIDE 50 MG/ML
25 INJECTION INTRAMUSCULAR; INTRAVENOUS EVERY 10 MIN PRN
Status: CANCELLED | OUTPATIENT
Start: 2019-10-29

## 2019-10-29 RX ORDER — SODIUM CHLORIDE 0.9 % (FLUSH) 0.9 %
10 SYRINGE (ML) INJECTION
Status: CANCELLED | OUTPATIENT
Start: 2019-10-29

## 2019-10-29 RX ORDER — AMLODIPINE BESYLATE 5 MG/1
5 TABLET ORAL DAILY
Qty: 30 TABLET | Refills: 11 | Status: SHIPPED | OUTPATIENT
Start: 2019-10-29 | End: 2019-12-31 | Stop reason: SDUPTHER

## 2019-10-29 RX ORDER — DICLOFENAC SODIUM 10 MG/G
GEL TOPICAL
Qty: 100 G | Refills: 0 | Status: SHIPPED | OUTPATIENT
Start: 2019-10-29 | End: 2019-11-19 | Stop reason: SDUPTHER

## 2019-10-29 RX ORDER — METHYLPREDNISOLONE SOD SUCC 125 MG
125 VIAL (EA) INJECTION ONCE AS NEEDED
Status: CANCELLED | OUTPATIENT
Start: 2019-10-29

## 2019-10-29 RX ORDER — HEPARIN 100 UNIT/ML
500 SYRINGE INTRAVENOUS
Status: CANCELLED | OUTPATIENT
Start: 2019-10-29

## 2019-10-29 RX ORDER — ROSUVASTATIN CALCIUM 10 MG/1
10 TABLET, COATED ORAL DAILY
Qty: 90 TABLET | Refills: 1 | Status: SHIPPED | OUTPATIENT
Start: 2019-10-29 | End: 2020-05-18 | Stop reason: SDUPTHER

## 2019-10-29 RX ADMIN — BEVACIZUMAB 1615 MG: 400 INJECTION, SOLUTION INTRAVENOUS at 02:10

## 2019-10-29 RX ADMIN — HEPARIN 500 UNITS: 100 SYRINGE at 03:10

## 2019-10-29 NOTE — PROGRESS NOTES
Subjective:       Patient ID: Casie Gaines is a 63 y.o. female.    Chief Complaint: Malignant neoplasm of ovary, unspecified laterality [C56.9]  HPI: We have an opportunity to see Ms. Casie Gaines in Hematology Oncology clinic at [facility] on 10/29/2019.  Ms. Casie Gaines is a 63 y.o. woman with peritoneal carcinomatosis with malignant right pleural effusion.  Final pathology is consistent with ovarian primary with  2740.  PET scan demonstrates multiple omental and peritoneal avid masses consistent with peritoneal carcinomatosis, extensive retroperitoneal and mediastinal lymphadenopathy, masslike structure in the right iliac fossa likely representing ovarian mass.  Patient has been evaluated by GYN Oncology Dr. Juarez and MD Green with recommendations for him carboplatin/Taxol/Avastin.      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  S/p 6 cycle avastin taxol carboplatin.  Has great MD after 6 cycles.  Currently on maintenance avastin.       Peritoneal carcinomatosis    1/26/2019 Initial Diagnosis     Peritoneal carcinomatosis      2/15/2019 - 7/8/2019 Chemotherapy     Treatment Summary   Plan Name: OP GYN PACLITAXEL CARBOPLATIN (AUC 6) Q3W  Treatment Goal: Palliative  Status: Inactive  Start Date: 2/28/2019  End Date: 6/18/2019  Provider: Will Trevizo Jr., MD  Chemotherapy: bevacizumab (AVASTIN) 15 mg/kg = 1,600 mg in sodium chloride 0.9% 100 mL chemo infusion, 15 mg/kg = 1,600 mg (100 % of original dose 15 mg/kg), Intravenous, Clinic/HOD 1 time, 6 of 6 cycles  Dose modification: 15 mg/kg (original dose 15 mg/kg, Cycle 1)  Administration: 1,600 mg (2/28/2019), 1,600 mg (3/21/2019), 1,600 mg (4/11/2019), 1,600 mg (5/7/2019), 1,600 mg (5/28/2019), 1,510 mg (6/18/2019)  CARBOplatin (PARAPLATIN) 870 mg in sodium chloride 0.9% 337 mL chemo infusion, 870 mg (100 % of original dose 868.8 mg), Intravenous, Clinic/HOD 1 time, 6 of 6 cycles  Dose  modification:   (original dose 868.8 mg, Cycle 1)  Administration: 870 mg (2/28/2019), 870 mg (3/21/2019), 900 mg (4/11/2019), 870 mg (5/7/2019), 660 mg (5/28/2019), 690 mg (6/18/2019)  PACLitaxel (TAXOL) 175 mg/m2 = 396 mg in sodium chloride 0.9% 566 mL chemo infusion, 175 mg/m2 = 396 mg, Intravenous, Clinic/HOD 1 time, 6 of 6 cycles  Dose modification: 140 mg/m2 (80 % of original dose 175 mg/m2, Cycle 5)  Administration: 396 mg (2/28/2019), 396 mg (3/21/2019), 396 mg (4/11/2019), 396 mg (5/7/2019), 318 mg (5/28/2019), 306 mg (6/18/2019)      10/8/2019 -  Chemotherapy     Treatment Summary   Plan Name: OP BEVACIZUMAB Q3W   Treatment Goal: Maintenance  Status: Active  Start Date: 10/9/2019  End Date: 9/8/2020 (Planned)  Provider: Oscar Mckeon MD  Chemotherapy: bevacizumab (AVASTIN) 15 mg/kg = 1,615 mg in sodium chloride 0.9% 100 mL chemo infusion, 15 mg/kg = 1,615 mg, Intravenous, Clinic/HOD 1 time, 1 of 17 cycles  Administration: 1,615 mg (10/9/2019)        Malignant neoplasm of right ovary    2/15/2019 Initial Diagnosis     Malignant neoplasm of right ovary      2/15/2019 - 7/8/2019 Chemotherapy     Treatment Summary   Plan Name: OP GYN PACLITAXEL CARBOPLATIN (AUC 6) Q3W  Treatment Goal: Palliative  Status: Inactive  Start Date: 2/28/2019  End Date: 6/18/2019  Provider: Will Trevizo Jr., MD  Chemotherapy: bevacizumab (AVASTIN) 15 mg/kg = 1,600 mg in sodium chloride 0.9% 100 mL chemo infusion, 15 mg/kg = 1,600 mg (100 % of original dose 15 mg/kg), Intravenous, Clinic/HOD 1 time, 6 of 6 cycles  Dose modification: 15 mg/kg (original dose 15 mg/kg, Cycle 1)  Administration: 1,600 mg (2/28/2019), 1,600 mg (3/21/2019), 1,600 mg (4/11/2019), 1,600 mg (5/7/2019), 1,600 mg (5/28/2019), 1,510 mg (6/18/2019)  CARBOplatin (PARAPLATIN) 870 mg in sodium chloride 0.9% 337 mL chemo infusion, 870 mg (100 % of original dose 868.8 mg), Intravenous, Clinic/[facility] 1 time, 6 of 6 cycles  Dose modification:   (original dose 868.8 mg,  Cycle 1)  Administration: 870 mg (2/28/2019), 870 mg (3/21/2019), 900 mg (4/11/2019), 870 mg (5/7/2019), 660 mg (5/28/2019), 690 mg (6/18/2019)  PACLitaxel (TAXOL) 175 mg/m2 = 396 mg in sodium chloride 0.9% 566 mL chemo infusion, 175 mg/m2 = 396 mg, Intravenous, Clinic/HOD 1 time, 6 of 6 cycles  Dose modification: 140 mg/m2 (80 % of original dose 175 mg/m2, Cycle 5)  Administration: 396 mg (2/28/2019), 396 mg (3/21/2019), 396 mg (4/11/2019), 396 mg (5/7/2019), 318 mg (5/28/2019), 306 mg (6/18/2019)      10/8/2019 -  Chemotherapy     Treatment Summary   Plan Name: OP BEVACIZUMAB Q3W   Treatment Goal: Maintenance  Status: Active  Start Date: 10/9/2019  End Date: 9/8/2020 (Planned)  Provider: Oscar Mckeon MD  Chemotherapy: bevacizumab (AVASTIN) 15 mg/kg = 1,615 mg in sodium chloride 0.9% 100 mL chemo infusion, 15 mg/kg = 1,615 mg, Intravenous, Clinic/HOD 1 time, 1 of 17 cycles  Administration: 1,615 mg (10/9/2019)        Ovarian cancer    2/18/2019 Initial Diagnosis     Ovarian cancer      10/8/2019 -  Chemotherapy     Treatment Summary   Plan Name: OP BEVACIZUMAB Q3W   Treatment Goal: Maintenance  Status: Active  Start Date: 10/9/2019  End Date: 9/8/2020 (Planned)  Provider: Oscar Mckeon MD  Chemotherapy: bevacizumab (AVASTIN) 15 mg/kg = 1,615 mg in sodium chloride 0.9% 100 mL chemo infusion, 15 mg/kg = 1,615 mg, Intravenous, Clinic/HOD 1 time, 1 of 17 cycles  Administration: 1,615 mg (10/9/2019)        Ovarian cancer, bilateral    8/15/2019 Initial Diagnosis     Ovarian cancer, bilateral      10/8/2019 -  Chemotherapy     Treatment Summary   Plan Name: OP BEVACIZUMAB Q3W   Treatment Goal: Maintenance  Status: Active  Start Date: 10/9/2019  End Date: 9/8/2020 (Planned)  Provider: Oscar Mckeon MD  Chemotherapy: bevacizumab (AVASTIN) 15 mg/kg = 1,615 mg in sodium chloride 0.9% 100 mL chemo infusion, 15 mg/kg = 1,615 mg, Intravenous, [facility] 1 time, 1 of 17 cycles  Administration: 1,615 mg (10/9/2019)       Past  Medical History:   Diagnosis Date    Anemia     Anxiety     Arthritis     knees    Cancer     ovarian    CHF (congestive heart failure)     Hx antineoplastic chemotherapy     last 2019    Hyperlipemia     Hypertension     Neck pain     Ovarian cancer 2019    CHEMO     Family History   Problem Relation Age of Onset    Anesthesia problems Other     Breast cancer Maternal Aunt     Breast cancer Paternal Aunt     Ovarian cancer Paternal Aunt     Colon cancer Brother     Thrombophilia Neg Hx      Social History     Socioeconomic History    Marital status:      Spouse name: Not on file    Number of children: Not on file    Years of education: Not on file    Highest education level: Not on file   Occupational History    Not on file   Social Needs    Financial resource strain: Not on file    Food insecurity:     Worry: Not on file     Inability: Not on file    Transportation needs:     Medical: Not on file     Non-medical: Not on file   Tobacco Use    Smoking status: Former Smoker     Packs/day: 1.00     Years: 25.00     Pack years: 25.00     Last attempt to quit: 10/1/2018     Years since quittin.0    Smokeless tobacco: Never Used    Tobacco comment: States started quit 2 months ago after 30 years   Substance and Sexual Activity    Alcohol use: Never     Alcohol/week: 0.0 standard drinks     Frequency: Never     Comment: occasionally  No alcohol 72h prior to sx    Drug use: No    Sexual activity: Not Currently     Partners: Male     Comment: hyst; mut monog   Lifestyle    Physical activity:     Days per week: Not on file     Minutes per session: Not on file    Stress: Not on file   Relationships    Social connections:     Talks on phone: Not on file     Gets together: Not on file     Attends Denominational service: Not on file     Active member of club or organization: Not on file     Attends meetings of clubs or organizations: Not on file     Relationship status: Not on file    Other Topics Concern    Not on file   Social History Narrative    Not on file     Past Surgical History:   Procedure Laterality Date    breast reduction  10/02/2018     SECTION      X 1    CYSTOSCOPY W/ URETERAL STENT PLACEMENT Right 2019    Procedure: CYSTOSCOPY, WITH URETERAL STENT INSERTION;  Surgeon: Timmy Santiago IV, MD;  Location: [facility] OR;  Service: Urology;  Laterality: Right;    DILATION AND CURETTAGE OF UTERUS      HYSTERECTOMY      RALH for fibroids (still has ovaries)    INSERTION OF VENOUS ACCESS PORT Left 2019    Procedure: INSERTION, VENOUS ACCESS PORT;  Surgeon: Ulisses Monzon MD;  Location: [facility] OR;  Service: General;  Laterality: Left;  Left internal jugular     LYSIS OF ADHESIONS OF URETER N/A 8/15/2019    Procedure: URETEROLYSIS;  Surgeon: Ismael Juarez MD;  Location: [facility] OR;  Service: OB/GYN;  Laterality: N/A;    OMENTECTOMY N/A 8/15/2019    Procedure: OMENTECTOMY;  Surgeon: Ismael Juarez MD;  Location: [facility] OR;  Service: OB/GYN;  Laterality: N/A;    RETROGRADE PYELOGRAPHY Right 2019    Procedure: PYELOGRAM, RETROGRADE;  Surgeon: Timmy Santiago IV, MD;  Location: [facility] OR;  Service: Urology;  Laterality: Right;    ROBOT-ASSISTED LAPAROSCOPIC SALPINGO-OOPHORECTOMY USING DA TIA XI Bilateral 8/15/2019    Procedure: XI ROBOTIC SALPINGO-OOPHORECTOMY;  Surgeon: Ismael Juarez MD;  Location[facility];  Service: OB/GYN;  Laterality: Bilateral;    TOTAL REDUCTION MAMMOPLASTY  2018    TUBAL LIGATION       Current Outpatient Medications   Medication Sig Dispense Refill    albuterol 90 mcg/actuation inhaler Inhale 2 puffs into the lungs every 4 (four) hours as needed for Wheezing. Rescue 18 g 0    biotin 1 mg tablet Take 1,000 mcg by mouth once daily.       cefadroxil (DURICEF) 500 MG Cap 1  capsule  two times a day 30 capsule 0    dexamethasone (DECADRON) 4 MG Tab Decadron 20 mg (5 tablets) by mouth 12 and 6 hr prior to chemotherapy 20 tablet 3     diclofenac sodium (VOLTAREN) 1 % Gel Apply once a day to foot 1 Tube 0    HYDROcodone-acetaminophen (NORCO) 5-325 mg per tablet Take 1 tablet by mouth every 6 (six) hours as needed. 20 tablet 0    ibuprofen (ADVIL,MOTRIN) 800 MG tablet Take 1 tablet (800 mg total) by mouth 2 (two) times daily. 60 tablet 1    lidocaine-prilocaine (EMLA) cream Apply topically as needed. 30 g 1    methen-sod phos-meth blue-hyos (UROGESIC-BLUE) 81.6-40.8-0.12 mg Tab Take 1 tablet by mouth 4 (four) times daily.      methen-sod phos-meth blue-hyos (UROGESIC-BLUE) 81.6-40.8-0.12 mg Tab Take 1 tablet by mouth four times daily for 10 days 40 tablet 4    multivitamin with minerals tablet Take 1 tablet by mouth once daily.       olmesartan-hydrochlorothiazide (BENICAR HCT) 40-12.5 mg Tab Take 1 tablet by mouth once daily. 90 tablet 0    ondansetron (ZOFRAN-ODT) 8 MG TbDL Take 1 tablet (8 mg total) by mouth every 8 (eight) hours as needed. 30 tablet 5    oxyCODONE-acetaminophen (PERCOCET)  mg per tablet Take 1 tablet by mouth every 8 (eight) hours as needed. 20 tablet 0    prochlorperazine (COMPAZINE) 10 MG tablet Take 1 tablet (10 mg total) by mouth every 6 (six) hours as needed. 30 tablet 5    rosuvastatin (CRESTOR) 10 MG tablet Take 1 tablet (10 mg total) by mouth once daily. 90 tablet 1    tolterodine (DETROL) 2 MG Tab Take 1 tablet (2 mg total) by mouth 2 (two) times daily. 60 tablet 11    zinc gluconate 50 mg tablet Take 50 mg by mouth once daily.       No current facility-administered medications for this visit.        Labs:  Lab Results   Component Value Date    WBC 9.87 10/29/2019    HGB 10.8 (L) 10/29/2019    HCT 34.1 (L) 10/29/2019    MCV 92 10/29/2019     10/29/2019     BMP  Lab Results   Component Value Date     10/09/2019    K 3.6 10/09/2019     10/09/2019    CO2 28 10/09/2019    BUN 14 10/09/2019    CREATININE 0.8 10/09/2019    CALCIUM 9.7 10/09/2019    ANIONGAP 9 10/09/2019    ESTGFRAFRICA  >60 10/09/2019    EGFRNONAA >60 10/09/2019     Lab Results   Component Value Date    ALT 39 10/09/2019    AST 33 10/09/2019    ALKPHOS 107 10/09/2019    BILITOT 0.5 10/09/2019       No results found for: IRON, TIBC, FERRITIN, SATURATEDIRO  No results found for: AALDNOPK29  No results found for: FOLATE  Lab Results   Component Value Date    TSH 1.869 04/19/2013       I have reviewed the radiology reports and examined the scan/xray images.    Review of Systems   Constitutional: Negative.    HENT: Negative.    Eyes: Negative.    Respiratory: Negative.    Cardiovascular: Negative.    Gastrointestinal: Negative.    Endocrine: Negative.    Genitourinary: Negative.    Musculoskeletal: Negative.    Skin: Negative.    Allergic/Immunologic: Negative.    Neurological: Negative.    Hematological: Negative.    Psychiatric/Behavioral: Negative.      ECOG SCORE              Objective:     Vitals:    10/29/19 0915   BP: (!) 160/78   Pulse: 75   Temp: 98.1 °F (36.7 °C)   Body mass index is 38.4 kg/m².  Physical Exam   Constitutional: She is oriented to person, place, and time. She appears well-developed and well-nourished.   HENT:   Head: Normocephalic and atraumatic.   Eyes: Conjunctivae and EOM are normal.   Neck: Normal range of motion. Neck supple.   Cardiovascular: Normal rate and regular rhythm.   Pulmonary/Chest: Effort normal and breath sounds normal.   Abdominal: Soft. Bowel sounds are normal.   Musculoskeletal: Normal range of motion.   Neurological: She is alert and oriented to person, place, and time.   Skin: Skin is warm and dry.   Psychiatric: She has a normal mood and affect. Her behavior is normal. Judgment and thought content normal.   Nursing note and vitals reviewed.        Assessment:      1. Malignant neoplasm of ovary, unspecified laterality           Plan:     Malignant neoplasm of ovary, unspecified laterality  Continue on maintenance avastin every 3 weeks for 22 cycles per  which showed improved PFS,  and catergory 2B recommended per NCCN guidelines.  -     rosuvastatin (CRESTOR) 10 MG tablet; Take 1 tablet (10 mg total) by mouth once daily.  Dispense: 90 tablet; Refill: 1  -     diclofenac sodium (VOLTAREN) 1 % Gel; Apply once a day to back and foot as needed  Dispense: 100 g; Refill: 0  -     ALPRAZolam (XANAX) 0.25 MG tablet; Take 1 tablet (0.25 mg total) by mouth 3 (three) times daily as needed for Anxiety.  Dispense: 60 tablet; Refill: 0

## 2019-10-29 NOTE — NURSING
Spoke with Dr. Mckeon regarding elevated B/P of 175/86.  Orders to proceed with Avastin today.  He also called in [facility] to pt pharmacy to start taking today and daily.  Pt states full understanding of this.

## 2019-10-29 NOTE — NURSING
Urine protein result from today is 2+.  Orders from Dr. Mckeon to proceed with Avastin until 3+.

## 2019-10-29 NOTE — DISCHARGE INSTRUCTIONS
[facility]  [address] [facility]  [address]  [phone] phone     [phone] fax  Hours of Operation: Monday- Friday 8:00am- 5:00pm  After hours phone  [phone]  Hematology / Oncology Physicians on call      Dr. Varinder Perrin, ASHLEIGH Espinosa NP Tyesha Taylor, NP    Please call with any concerns regarding your appointment today.    IF YOU EXPERIENCE ANY OF THE FOLLOWING PROBLEMS, CALL THE OFFICE IMMEDIATELY.    *FEVER .0 OR GREATER    *CHILLS, ESPECIALLY SHAKING CHILLS, OR SWEATING    *A SEVERE COUGH OR SORE THROAT, OR SINUS PAIN/     PRESSURE    *REDNESS, SWELLING, OR TENDERNESS AROUND A WOUND,     SORE, PIMPLE, RECTAL AREA, OR IV SITE    *SORES OR ULCERS IN THE MOUTH    *BLISTERS ON THE LIPS OR SKIN    *FREQUENT URGENCY TO URINATE OR A BURNING FEELING   WHEN YOU URINATE    *BLOOD IN THE URINE OR STOOL    *ANY UNEXPLAINED BRUISING OR PROLONGED BLEEDING,     (NOSEBLEEDS OR BLEEDING GUMS)    *LOOSE BOWEL MOVEMENTS THAT DO NOT RESPOND TO     IMODIUM OR MORE THAN THREE TIMES A DAY    *VOMITING UNRESPONSIVE TO ANTINAUSEA MEDICINE    *ANY UNUSUAL PHYSICAL SYMPTOMS THAT BEGAN AFTER     CHEMOTHERAPY    DURING WEEKDAYS, CALL AND ASK TO SPEAK DIRECTLY TO A NURSE.  AT OTHER TIMES, CALL THE OFFICE PHONE NUMBER; THE ANSWERING SERVICE WILL CONTACT THE ON-CALL PHYSICIAN.  SOMEONE IS AVAILABLE 24 HOURS A DAY, SEVEN DAYS A WEEK.

## 2019-10-30 ENCOUNTER — TELEPHONE (OUTPATIENT)
Dept: INTERNAL MEDICINE | Facility: CLINIC | Age: 64
End: 2019-10-30

## 2019-10-30 NOTE — TELEPHONE ENCOUNTER
----- Message from Mitajc Shah sent at 10/30/2019  8:05 AM CDT -----  Contact: pt   Type:  Sooner Apoointment Request    Caller is requesting a sooner appointment.  Caller declined first available appointment listed below.  Caller will not accept being placed on the waitlist and is requesting a message be sent to doctor.  Name of Caller: Casie  When is the first available appointment? 12/11  Symptoms: tingling in hands/ back pain  Would the patient rather a call back or a response via [employer]? call  Best Call Back Number: [phone]  Additional Information:  Pt was scheduled for today, but had to cancel due to death in family. Pt would like to be worked in next week, any day after 11/06.

## 2019-11-04 ENCOUNTER — OFFICE VISIT (OUTPATIENT)
Dept: PODIATRY | Facility: CLINIC | Age: 64
End: 2019-11-04
Payer: MEDICARE

## 2019-11-04 VITALS
BODY MASS INDEX: 38.69 KG/M2 | SYSTOLIC BLOOD PRESSURE: 170 MMHG | HEIGHT: 67 IN | RESPIRATION RATE: 17 BRPM | WEIGHT: 246.5 LBS | HEART RATE: 81 BPM | DIASTOLIC BLOOD PRESSURE: 94 MMHG

## 2019-11-04 DIAGNOSIS — M21.42 ACQUIRED PES PLANUS, LEFT: ICD-10-CM

## 2019-11-04 DIAGNOSIS — M19.072 OSTEOARTHRITIS OF LEFT ANKLE OR FOOT: ICD-10-CM

## 2019-11-04 DIAGNOSIS — Q74.2 ACCESSORY NAVICULAR BONE OF LEFT FOOT: ICD-10-CM

## 2019-11-04 DIAGNOSIS — M24.572 CONTRACTURE, LEFT ANKLE: ICD-10-CM

## 2019-11-04 DIAGNOSIS — M25.572 PAIN IN LEFT ANKLE AND JOINTS OF LEFT FOOT: ICD-10-CM

## 2019-11-04 DIAGNOSIS — M76.822 POSTERIOR TIBIAL TENDON DYSFUNCTION, LEFT: Primary | ICD-10-CM

## 2019-11-04 PROCEDURE — 99213 OFFICE O/P EST LOW 20 MIN: CPT | Mod: PBBFAC | Performed by: PODIATRIST

## 2019-11-04 PROCEDURE — 99213 PR OFFICE/OUTPT VISIT, EST, LEVL III, 20-29 MIN: ICD-10-PCS | Mod: S$PBB,,, | Performed by: PODIATRIST

## 2019-11-04 PROCEDURE — 99213 OFFICE O/P EST LOW 20 MIN: CPT | Mod: S$PBB,,, | Performed by: PODIATRIST

## 2019-11-04 PROCEDURE — 99999 PR PBB SHADOW E&M-EST. PATIENT-LVL III: ICD-10-PCS | Mod: PBBFAC,,, | Performed by: PODIATRIST

## 2019-11-04 PROCEDURE — 99999 PR PBB SHADOW E&M-EST. PATIENT-LVL III: CPT | Mod: PBBFAC,,, | Performed by: PODIATRIST

## 2019-11-04 NOTE — PROGRESS NOTES
Subjective:       Patient ID: Casie Gaines is a 63 y.o. female.    Chief Complaint: Follow-up (Left posrterior tibial tendon, rates pain 1/10, wear waling boot,  non diabetic, PCP Dr. Ang)    HPI: Casie Gaines presents to the office today, for follow up concerning PTTD of the LLE. Patient was last seen on 9/30 and was dispensed a CAM Walker.  At this time, patient rates her pains at 1/10 when wearing the walking boot.  She has also been taking oral NSAID.  When she is not wearing the walking boot, her pains are 9/10.  Patient states only 2 outpatient physical therapy sessions since her last evaluation.  Patient does state persistent, though improved swelling. Patient's symptoms are worse with prolonged walking and standing.    Review of patient's allergies indicates:  No Known Allergies    Past Medical History:   Diagnosis Date    Anemia     Anxiety     Arthritis     knees    Cancer     ovarian    CHF (congestive heart failure)     Hx antineoplastic chemotherapy     last 6/2019    Hyperlipemia     Hypertension     Neck pain     Ovarian cancer 2019    CHEMO       Family History   Problem Relation Age of Onset    Anesthesia problems Other     Breast cancer Maternal Aunt     Breast cancer Paternal Aunt     Ovarian cancer Paternal Aunt     Colon cancer Brother     Thrombophilia Neg Hx        Social History     Socioeconomic History    Marital status:      Spouse name: Not on file    Number of children: Not on file    Years of education: Not on file    Highest education level: Not on file   Occupational History    Not on file   Social Needs    Financial resource strain: Not on file    Food insecurity:     Worry: Not on file     Inability: Not on file    Transportation needs:     Medical: Not on file     Non-medical: Not on file   Tobacco Use    Smoking status: Former Smoker     Packs/day: 1.00     Years: 25.00     Pack years: 25.00     Last attempt to quit: 10/1/2018      Years since quittin.0    Smokeless tobacco: Never Used    Tobacco comment: States started quit 2 months ago after 30 years   Substance and Sexual Activity    Alcohol use: Never     Alcohol/week: 0.0 standard drinks     Frequency: Never     Comment: occasionally  No alcohol 72h prior to sx    Drug use: No    Sexual activity: Not Currently     Partners: Male     Comment: hyst; mut monog   Lifestyle    Physical activity:     Days per week: Not on file     Minutes per session: Not on file    Stress: Not on file   Relationships    Social connections:     Talks on phone: Not on file     Gets together: Not on file     Attends Hindu service: Not on file     Active member of club or organization: Not on file     Attends meetings of clubs or organizations: Not on file     Relationship status: Not on file   Other Topics Concern    Not on file   Social History Narrative    Not on file       Past Surgical History:   Procedure Laterality Date    breast reduction  10/02/2018     SECTION      X 1    CYSTOSCOPY W/ URETERAL STENT PLACEMENT Right 2019    Procedure: CYSTOSCOPY, WITH URETERAL STENT INSERTION;  Surgeon: Timmy Santiago IV, MD;  Location: [facility] OR;  Service: Urology;  Laterality: Right;    DILATION AND CURETTAGE OF UTERUS      HYSTERECTOMY      RALH for fibroids (still has ovaries)    INSERTION OF VENOUS ACCESS PORT Left 2019    Procedure: INSERTION, VENOUS ACCESS PORT;  Surgeon: Ulisses Monzon MD;  Location: [facility] OR;  Service: General;  Laterality: Left;  Left internal jugular     LYSIS OF ADHESIONS OF URETER N/A 8/15/2019    Procedure: URETEROLYSIS;  Surgeon: Ismael Juarez MD;  Location: [facility] OR;  Service: OB/GYN;  Laterality: N/A;    OMENTECTOMY N/A 8/15/2019    Procedure: OMENTECTOMY;  Surgeon: Ismael Juarez MD;  Location: [facility] OR;  Service: OB/GYN;  Laterality: N/A;    RETROGRADE PYELOGRAPHY Right 2019    Procedure: PYELOGRAM, RETROGRADE;  Surgeon: Timmy Santiago  "MD CYRIL;  Location: [facility] OR;  Service: Urology;  Laterality: Right;    ROBOT-ASSISTED LAPAROSCOPIC SALPINGO-OOPHORECTOMY USING DA TIA XI Bilateral 8/15/2019    Procedure: XI ROBOTIC SALPINGO-OOPHORECTOMY;  Surgeon: Ismael Juarez MD;  Location: [facility] OR;  Service: OB/GYN;  Laterality: Bilateral;    TOTAL REDUCTION MAMMOPLASTY  2018    TUBAL LIGATION         Review of Systems   Constitutional: Negative for chills, fatigue and fever.   HENT: Negative for hearing loss.    Eyes: Negative for photophobia and visual disturbance.   Respiratory: Negative for cough, chest tightness, shortness of breath and wheezing.    Cardiovascular: Negative for chest pain and palpitations.   Gastrointestinal: Negative for constipation, diarrhea, nausea and vomiting.   Endocrine: Negative for cold intolerance and heat intolerance.   Genitourinary: Negative for flank pain.   Musculoskeletal: Positive for gait problem. Negative for neck pain and neck stiffness.   Skin: Negative for wound.   Neurological: Negative for light-headedness and headaches.   Psychiatric/Behavioral: Negative for sleep disturbance.          Objective:   BP (!) 170/94 (BP Location: Right arm, Patient Position: Sitting, BP Method: Medium (Automatic))   Pulse 81   Resp 17   Ht 5' 7" (1.702 m)   Wt 111.8 kg (246 lb 7.6 oz)   BMI 38.60 kg/m²       Physical Exam  LOWER EXTREMITY PHYSICAL EXAMINATION  NEUROLOGY: Sensation to light touch is intact. Proprioception is intact. Sensation to pin prick is intact. Deep tendon reflexes of the lower extremity are WNL.    DERMATOLOGY: Skin is supple, dry and intact. No ecchymosis is noted. No hypertrophic skin formation. No erythema or cellulitis is noted.      VASCULAR: On the left foot, the dorsalis pedis pulse is 2/4 and the posterior tibial pulse is 2/4. Capillary refill time is less than 3 seconds. Hair growth is present on the dorsum of the foot and at the digits. No rubor is present. Proximal to distal temperature is " warm to warm.    ORTHOPEDIC:  Persistent discomfort along the course of the posterior tibial tendon, left lower extremity.  Discomfort with palpation along the insertion of the posterior tibial tendon. Edema is noted. Range of motion of the hindfoot is guarded due to pain.  Pes planus foot type.  Equinus contracture.     Assessment:     1. Posterior tibial tendon dysfunction, left    2. Pain in left ankle and joints of left foot    3. Contracture, left ankle    4. Acquired pes planus, left    5. Accessory navicular bone of left foot    6. Osteoarthritis of left ankle or foot        Plan:     Posterior tibial tendon dysfunction, left    Pain in left ankle and joints of left foot    Contracture, left ankle    Acquired pes planus, left    Accessory navicular bone of left foot    Osteoarthritis of left ankle or foot       Thorough discussion is had with the patient today, concerning the diagnosis, its etiology, and the treatment algorithm at present.  Patient will continue outpatient physical therapy as she has only attended two sessions.  Follow up in approximately 1 month.  Did discuss possible transition areas on bracing at that time.  Continue Tylenol and/or NSAIDs if no overt contraindication.          Future Appointments   Date Time Provider Department Center   11/6/2019  1:30 PM Wilner Green MD [facility]   11/11/2019 10:20 AM Rossana Ang MD [facility]   11/19/2019  8:[facility]   11/19/2019  8:30 AM SPECIMEN, [facility] SPECLAB [facility]   11/19/2019  9:00 AM Oscar Mckeon MD [facility] HEM ONC [facility]   11/19/2019  1:30 PM CHAIR 12 [facility] CHEMO [facility]

## 2019-11-11 ENCOUNTER — TELEPHONE (OUTPATIENT)
Dept: NEUROLOGY | Facility: CLINIC | Age: 64
End: 2019-11-11

## 2019-11-11 ENCOUNTER — OFFICE VISIT (OUTPATIENT)
Dept: INTERNAL MEDICINE | Facility: CLINIC | Age: 64
End: 2019-11-11
Payer: MEDICARE

## 2019-11-11 DIAGNOSIS — C56.1 MALIGNANT NEOPLASM OF RIGHT OVARY: ICD-10-CM

## 2019-11-11 DIAGNOSIS — M54.40 CHRONIC MIDLINE LOW BACK PAIN WITH SCIATICA, SCIATICA LATERALITY UNSPECIFIED: ICD-10-CM

## 2019-11-11 DIAGNOSIS — G89.29 CHRONIC MIDLINE LOW BACK PAIN WITH SCIATICA, SCIATICA LATERALITY UNSPECIFIED: ICD-10-CM

## 2019-11-11 DIAGNOSIS — M54.2 NECK PAIN: ICD-10-CM

## 2019-11-11 DIAGNOSIS — G89.29 CHRONIC MIDLINE LOW BACK PAIN, UNSPECIFIED WHETHER SCIATICA PRESENT: Primary | ICD-10-CM

## 2019-11-11 DIAGNOSIS — F41.9 ANXIETY: ICD-10-CM

## 2019-11-11 DIAGNOSIS — I10 ESSENTIAL HYPERTENSION: ICD-10-CM

## 2019-11-11 DIAGNOSIS — M54.50 CHRONIC MIDLINE LOW BACK PAIN, UNSPECIFIED WHETHER SCIATICA PRESENT: Primary | ICD-10-CM

## 2019-11-11 DIAGNOSIS — R20.2 HAND PARESTHESIA, UNSPECIFIED LATERALITY: ICD-10-CM

## 2019-11-11 DIAGNOSIS — C78.6 PERITONEAL CARCINOMATOSIS: ICD-10-CM

## 2019-11-11 DIAGNOSIS — R35.0 URINARY FREQUENCY: ICD-10-CM

## 2019-11-11 DIAGNOSIS — Z90.722 S/P BSO (BILATERAL SALPINGO-OOPHORECTOMY): ICD-10-CM

## 2019-11-11 PROCEDURE — 99999 PR PBB SHADOW E&M-EST. PATIENT-LVL IV: CPT | Mod: PBBFAC,,, | Performed by: FAMILY MEDICINE

## 2019-11-11 PROCEDURE — 99999 PR PBB SHADOW E&M-EST. PATIENT-LVL IV: ICD-10-PCS | Mod: PBBFAC,,, | Performed by: FAMILY MEDICINE

## 2019-11-11 PROCEDURE — 99214 PR OFFICE/OUTPT VISIT, EST, LEVL IV, 30-39 MIN: ICD-10-PCS | Mod: S$PBB,,, | Performed by: FAMILY MEDICINE

## 2019-11-11 PROCEDURE — 99214 OFFICE O/P EST MOD 30 MIN: CPT | Mod: PBBFAC | Performed by: FAMILY MEDICINE

## 2019-11-11 PROCEDURE — 99214 OFFICE O/P EST MOD 30 MIN: CPT | Mod: S$PBB,,, | Performed by: FAMILY MEDICINE

## 2019-11-11 RX ORDER — GABAPENTIN 100 MG/1
100 CAPSULE ORAL NIGHTLY
Qty: 30 CAPSULE | Refills: 11 | Status: SHIPPED | OUTPATIENT
Start: 2019-11-11 | End: 2020-03-03 | Stop reason: SDUPTHER

## 2019-11-11 RX ORDER — SERTRALINE HYDROCHLORIDE 25 MG/1
25 TABLET, FILM COATED ORAL DAILY
Qty: 30 TABLET | Refills: 11 | Status: SHIPPED | OUTPATIENT
Start: 2019-11-11 | End: 2020-12-22 | Stop reason: SDUPTHER
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
## 2019-11-11 NOTE — TELEPHONE ENCOUNTER
Neurology , it will be quicker to get you provider to put the Order in for a EMG Karen michaud EMG tech will contact the pt and schedule it she works out a que .

## 2019-11-11 NOTE — TELEPHONE ENCOUNTER
----- Message from Maryellen Swan MA sent at 11/11/2019 11:59 AM CST -----  Hello,    I had a quick question. This patient needs an EMG. Would that go through Neurology or physiatry?    Jose DIAZ

## 2019-11-18 ENCOUNTER — HOSPITAL ENCOUNTER (OUTPATIENT)
Dept: RADIOLOGY | Facility: HOSPITAL | Age: 64
Discharge: HOME | End: 2019-11-18
Attending: FAMILY MEDICINE
Payer: MEDICARE

## 2019-11-18 DIAGNOSIS — M54.2 NECK PAIN: ICD-10-CM

## 2019-11-18 DIAGNOSIS — M54.40 CHRONIC MIDLINE LOW BACK PAIN WITH SCIATICA, SCIATICA LATERALITY UNSPECIFIED: ICD-10-CM

## 2019-11-18 DIAGNOSIS — G89.29 CHRONIC MIDLINE LOW BACK PAIN WITH SCIATICA, SCIATICA LATERALITY UNSPECIFIED: ICD-10-CM

## 2019-11-18 PROCEDURE — 72141 MRI NECK SPINE W/O DYE: CPT | Mod: TC

## 2019-11-18 PROCEDURE — 72148 MRI LUMBAR SPINE W/O DYE: CPT | Mod: TC

## 2019-11-18 NOTE — PROGRESS NOTES
Needs appt with [facility].  ASAP  She is medicaid until next month with Medicare.    Please messaged [facility] for help in getting her seen.

## 2019-11-19 ENCOUNTER — OFFICE VISIT (OUTPATIENT)
Dept: HEMATOLOGY/ONCOLOGY | Facility: CLINIC | Age: 64
End: 2019-11-19
Payer: MEDICARE

## 2019-11-19 ENCOUNTER — OFFICE VISIT (OUTPATIENT)
Dept: RHEUMATOLOGY | Facility: CLINIC | Age: 64
End: 2019-11-19
Payer: MEDICARE

## 2019-11-19 ENCOUNTER — TELEPHONE (OUTPATIENT)
Dept: NEUROSURGERY | Facility: CLINIC | Age: 64
End: 2019-11-19

## 2019-11-19 ENCOUNTER — LAB VISIT (OUTPATIENT)
Dept: LAB | Facility: HOSPITAL | Age: 64
End: 2019-11-19
Attending: INTERNAL MEDICINE
Payer: MEDICARE

## 2019-11-19 VITALS
HEART RATE: 77 BPM | HEIGHT: 67 IN | BODY MASS INDEX: 37.68 KG/M2 | DIASTOLIC BLOOD PRESSURE: 73 MMHG | OXYGEN SATURATION: 98 % | WEIGHT: 240.06 LBS | TEMPERATURE: 98 F | SYSTOLIC BLOOD PRESSURE: 145 MMHG

## 2019-11-19 VITALS
BODY MASS INDEX: 37.65 KG/M2 | SYSTOLIC BLOOD PRESSURE: 151 MMHG | WEIGHT: 239.88 LBS | HEART RATE: 75 BPM | DIASTOLIC BLOOD PRESSURE: 86 MMHG | HEIGHT: 67 IN

## 2019-11-19 DIAGNOSIS — M17.0 OSTEOARTHRITIS OF BOTH KNEES, UNSPECIFIED OSTEOARTHRITIS TYPE: ICD-10-CM

## 2019-11-19 DIAGNOSIS — C56.9 MALIGNANT NEOPLASM OF OVARY, UNSPECIFIED LATERALITY: ICD-10-CM

## 2019-11-19 DIAGNOSIS — E66.9 OBESITY, UNSPECIFIED CLASSIFICATION, UNSPECIFIED OBESITY TYPE, UNSPECIFIED WHETHER SERIOUS COMORBIDITY PRESENT: ICD-10-CM

## 2019-11-19 DIAGNOSIS — N39.0 URINARY TRACT INFECTION WITHOUT HEMATURIA, SITE UNSPECIFIED: ICD-10-CM

## 2019-11-19 DIAGNOSIS — C56.9 MALIGNANT NEOPLASM OF OVARY, UNSPECIFIED LATERALITY: Primary | ICD-10-CM

## 2019-11-19 DIAGNOSIS — M15.9 PRIMARY OSTEOARTHRITIS INVOLVING MULTIPLE JOINTS: Primary | ICD-10-CM

## 2019-11-19 DIAGNOSIS — Z71.89 COUNSELING ON HEALTH PROMOTION AND DISEASE PREVENTION: ICD-10-CM

## 2019-11-19 LAB
ALBUMIN SERPL BCP-MCNC: 3.1 G/DL (ref 3.5–5.2)
ALP SERPL-CCNC: 106 U/L (ref 55–135)
ALT SERPL W/O P-5'-P-CCNC: 33 U/L (ref 10–44)
ANION GAP SERPL CALC-SCNC: 8 MMOL/L (ref 8–16)
AST SERPL-CCNC: 29 U/L (ref 10–40)
BASOPHILS # BLD AUTO: 0.08 K/UL (ref 0–0.2)
BASOPHILS NFR BLD: 0.9 % (ref 0–1.9)
BILIRUB SERPL-MCNC: 0.3 MG/DL (ref 0.1–1)
BUN SERPL-MCNC: 16 MG/DL (ref 8–23)
CALCIUM SERPL-MCNC: 9.5 MG/DL (ref 8.7–10.5)
CHLORIDE SERPL-SCNC: 107 MMOL/L (ref 95–110)
CO2 SERPL-SCNC: 30 MMOL/L (ref 23–29)
CREAT SERPL-MCNC: 0.8 MG/DL (ref 0.5–1.4)
DIFFERENTIAL METHOD: ABNORMAL
EOSINOPHIL # BLD AUTO: 0.2 K/UL (ref 0–0.5)
EOSINOPHIL NFR BLD: 2.8 % (ref 0–8)
ERYTHROCYTE [DISTWIDTH] IN BLOOD BY AUTOMATED COUNT: 17.1 % (ref 11.5–14.5)
EST. GFR  (AFRICAN AMERICAN): >60 ML/MIN/1.73 M^2
EST. GFR  (NON AFRICAN AMERICAN): >60 ML/MIN/1.73 M^2
GLUCOSE SERPL-MCNC: 101 MG/DL (ref 70–110)
HCT VFR BLD AUTO: 36.3 % (ref 37–48.5)
HGB BLD-MCNC: 11.2 G/DL (ref 12–16)
IMM GRANULOCYTES # BLD AUTO: 0.03 K/UL (ref 0–0.04)
IMM GRANULOCYTES NFR BLD AUTO: 0.4 % (ref 0–0.5)
LYMPHOCYTES # BLD AUTO: 2.3 K/UL (ref 1–4.8)
LYMPHOCYTES NFR BLD: 26.8 % (ref 18–48)
MCH RBC QN AUTO: 28.4 PG (ref 27–31)
MCHC RBC AUTO-ENTMCNC: 30.9 G/DL (ref 32–36)
MCV RBC AUTO: 92 FL (ref 82–98)
MONOCYTES # BLD AUTO: 0.6 K/UL (ref 0.3–1)
MONOCYTES NFR BLD: 6.7 % (ref 4–15)
NEUTROPHILS # BLD AUTO: 5.3 K/UL (ref 1.8–7.7)
NEUTROPHILS NFR BLD: 62.8 % (ref 38–73)
NRBC BLD-RTO: 0 /100 WBC
PLATELET # BLD AUTO: 218 K/UL (ref 150–350)
PMV BLD AUTO: 9.4 FL (ref 9.2–12.9)
POTASSIUM SERPL-SCNC: 3.9 MMOL/L (ref 3.5–5.1)
PROT SERPL-MCNC: 6.2 G/DL (ref 6–8.4)
RBC # BLD AUTO: 3.94 M/UL (ref 4–5.4)
SODIUM SERPL-SCNC: 145 MMOL/L (ref 136–145)
URATE SERPL-MCNC: 5.2 MG/DL (ref 2.4–5.7)
WBC # BLD AUTO: 8.5 K/UL (ref 3.9–12.7)

## 2019-11-19 PROCEDURE — 99999 PR PBB SHADOW E&M-EST. PATIENT-LVL IV: ICD-10-PCS | Mod: PBBFAC,,, | Performed by: INTERNAL MEDICINE

## 2019-11-19 PROCEDURE — 99205 PR OFFICE/OUTPT VISIT, NEW, LEVL V, 60-74 MIN: ICD-10-PCS | Mod: S$PBB,,, | Performed by: INTERNAL MEDICINE

## 2019-11-19 PROCEDURE — 99205 OFFICE O/P NEW HI 60 MIN: CPT | Mod: S$PBB,,, | Performed by: INTERNAL MEDICINE

## 2019-11-19 PROCEDURE — 99215 OFFICE O/P EST HI 40 MIN: CPT | Mod: S$PBB,,, | Performed by: INTERNAL MEDICINE

## 2019-11-19 PROCEDURE — 85025 COMPLETE CBC W/AUTO DIFF WBC: CPT

## 2019-11-19 PROCEDURE — 99214 OFFICE O/P EST MOD 30 MIN: CPT | Mod: PBBFAC,27 | Performed by: INTERNAL MEDICINE

## 2019-11-19 PROCEDURE — 36415 COLL VENOUS BLD VENIPUNCTURE: CPT

## 2019-11-19 PROCEDURE — 80053 COMPREHEN METABOLIC PANEL: CPT

## 2019-11-19 PROCEDURE — 99999 PR PBB SHADOW E&M-EST. PATIENT-LVL IV: CPT | Mod: PBBFAC,,, | Performed by: INTERNAL MEDICINE

## 2019-11-19 PROCEDURE — 84550 ASSAY OF BLOOD/URIC ACID: CPT

## 2019-11-19 PROCEDURE — 99214 OFFICE O/P EST MOD 30 MIN: CPT | Mod: PBBFAC | Performed by: INTERNAL MEDICINE

## 2019-11-19 PROCEDURE — 99215 PR OFFICE/OUTPT VISIT, EST, LEVL V, 40-54 MIN: ICD-10-PCS | Mod: S$PBB,,, | Performed by: INTERNAL MEDICINE

## 2019-11-19 RX ORDER — CIPROFLOXACIN 500 MG/1
500 TABLET ORAL 2 TIMES DAILY
Qty: 6 TABLET | Refills: 0 | Status: SHIPPED | OUTPATIENT
Start: 2019-11-19 | End: 2019-11-22

## 2019-11-19 RX ORDER — DICLOFENAC SODIUM 10 MG/G
GEL TOPICAL
Qty: 100 G | Refills: 1 | Status: SHIPPED | OUTPATIENT
Start: 2019-11-19 | End: 2020-03-03 | Stop reason: SDUPTHER

## 2019-11-19 NOTE — LETTER
November 19, 2019      Oscar Mckeon MD  [address]  [place] [address]           [facility]  [address]  [place] [address]  Phone: [phone]  Fax: [phone]          Patient: Casie Gaines   MR Number: [id number]   YOB: 1955   Date of Visit: 11/19/2019       Dear Dr. Oscar Mckeon:    Thank you for referring Casie Gaines to me for evaluation. Attached you will find relevant portions of my assessment and plan of care.    If you have questions, please do not hesitate to call me. I look forward to following Casie Gaines along with you.    Sincerely,    Mario Schwartz MD    Enclosure  CC:  No Recipients    If you would like to receive this communication electronically, please contact [email or web address] or [phone] to request more information on [employer] Link access.    For providers and/or their staff who would like to refer a patient to [facility], please contact us through our one-stop-shop provider referral line, [facility], at [phone].    If you feel you have received this communication in error or would no longer like to receive these types of communications, please e-mail [email or web address]

## 2019-11-19 NOTE — PROGRESS NOTES
Subjective:       Patient ID: Casie Gaines is a 64 y.o. female.    Chief Complaint: Malignant neoplasm of ovary, unspecified laterality [C56.9]  HPI: We have an opportunity to see Ms. Casie Gaines in Hematology Oncology clinic at [facility] on 11/19/2019.  Ms. Casie Gaines is a 64 y.o. woman with peritoneal carcinomatosis with malignant right pleural effusion.  Final pathology is consistent with ovarian primary with  2740.  PET scan demonstrates multiple omental and peritoneal avid masses consistent with peritoneal carcinomatosis, extensive retroperitoneal and mediastinal lymphadenopathy, masslike structure in the right iliac fossa likely representing ovarian mass.  Patient has been evaluated by GYN Oncology Dr. Juarez and MD Green with recommendations for him carboplatin/Taxol/Avastin.      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  S/p 6 cycle avastin taxol carboplatin.  Has great [place] after 6 cycles.  Currently on maintenance avastin.          Peritoneal carcinomatosis    1/26/2019 Initial Diagnosis     Peritoneal carcinomatosis      2/15/2019 - 7/8/2019 Chemotherapy     Treatment Summary   Plan Name: OP GYN PACLITAXEL CARBOPLATIN (AUC 6) Q3W  Treatment Goal: Palliative  Status: Inactive  Start Date: 2/28/2019  End Date: 6/18/2019  Provider: Will Trevizo Jr., MD  Chemotherapy: bevacizumab (AVASTIN) 15 mg/kg = 1,600 mg in sodium chloride 0.9% 100 mL chemo infusion, 15 mg/kg = 1,600 mg (100 % of original dose 15 mg/kg), Intravenous, Clinic/HOD 1 time, 6 of 6 cycles  Dose modification: 15 mg/kg (original dose 15 mg/kg, Cycle 1)  Administration: 1,600 mg (2/28/2019), 1,600 mg (3/21/2019), 1,600 mg (4/11/2019), 1,600 mg (5/7/2019), 1,600 mg (5/28/2019), 1,510 mg (6/18/2019)  CARBOplatin (PARAPLATIN) 870 mg in sodium chloride 0.9% 337 mL chemo infusion, 870 mg (100 % of original dose 868.8 mg), Intravenous, Clinic/HOD 1 time, 6 of 6 cycles  Dose  modification:   (original dose 868.8 mg, Cycle 1)  Administration: 870 mg (2/28/2019), 870 mg (3/21/2019), 900 mg (4/11/2019), 870 mg (5/7/2019), 660 mg (5/28/2019), 690 mg (6/18/2019)  PACLitaxel (TAXOL) 175 mg/m2 = 396 mg in sodium chloride 0.9% 566 mL chemo infusion, 175 mg/m2 = 396 mg, Intravenous, Clinic/HOD 1 time, 6 of 6 cycles  Dose modification: 140 mg/m2 (80 % of original dose 175 mg/m2, Cycle 5)  Administration: 396 mg (2/28/2019), 396 mg (3/21/2019), 396 mg (4/11/2019), 396 mg (5/7/2019), 318 mg (5/28/2019), 306 mg (6/18/2019)      10/8/2019 -  Chemotherapy     Treatment Summary   Plan Name: OP BEVACIZUMAB Q3W   Treatment Goal: Maintenance  Status: Active  Start Date: 10/9/2019  End Date: 9/8/2020 (Planned)  Provider: Oscar Mckeon MD  Chemotherapy: bevacizumab (AVASTIN) 15 mg/kg = 1,615 mg in sodium chloride 0.9% 100 mL chemo infusion, 15 mg/kg = 1,615 mg, Intravenous, Clinic/HOD 1 time, 2 of 17 cycles  Administration: 1,615 mg (10/9/2019), 1,615 mg (10/29/2019)        Malignant neoplasm of right ovary    2/15/2019 Initial Diagnosis     Malignant neoplasm of right ovary      2/15/2019 - 7/8/2019 Chemotherapy     Treatment Summary   Plan Name: OP GYN PACLITAXEL CARBOPLATIN (AUC 6) Q3W  Treatment Goal: Palliative  Status: Inactive  Start Date: 2/28/2019  End Date: 6/18/2019  Provider: Will Trevizo Jr., MD  Chemotherapy: bevacizumab (AVASTIN) 15 mg/kg = 1,600 mg in sodium chloride 0.9% 100 mL chemo infusion, 15 mg/kg = 1,600 mg (100 % of original dose 15 mg/kg), Intravenous, Clinic/HOD 1 time, 6 of 6 cycles  Dose modification: 15 mg/kg (original dose 15 mg/kg, Cycle 1)  Administration: 1,600 mg (2/28/2019), 1,600 mg (3/21/2019), 1,600 mg (4/11/2019), 1,600 mg (5/7/2019), 1,600 mg (5/28/2019), 1,510 mg (6/18/2019)  CARBOplatin (PARAPLATIN) 870 mg in sodium chloride 0.9% 337 mL chemo infusion, 870 mg (100 % of original dose 868.8 mg), Intravenous, Clinic/hospitals 1 time, 6 of 6 cycles  Dose modification:    (original dose 868.8 mg, Cycle 1)  Administration: 870 mg (2/28/2019), 870 mg (3/21/2019), 900 mg (4/11/2019), 870 mg (5/7/2019), 660 mg (5/28/2019), 690 mg (6/18/2019)  PACLitaxel (TAXOL) 175 mg/m2 = 396 mg in sodium chloride 0.9% 566 mL chemo infusion, 175 mg/m2 = 396 mg, Intravenous, Clinic/HOD 1 time, 6 of 6 cycles  Dose modification: 140 mg/m2 (80 % of original dose 175 mg/m2, Cycle 5)  Administration: 396 mg (2/28/2019), 396 mg (3/21/2019), 396 mg (4/11/2019), 396 mg (5/7/2019), 318 mg (5/28/2019), 306 mg (6/18/2019)      10/8/2019 -  Chemotherapy     Treatment Summary   Plan Name: OP BEVACIZUMAB Q3W   Treatment Goal: Maintenance  Status: Active  Start Date: 10/9/2019  End Date: 9/8/2020 (Planned)  Provider: Oscar Mckeon MD  Chemotherapy: bevacizumab (AVASTIN) 15 mg/kg = 1,615 mg in sodium chloride 0.9% 100 mL chemo infusion, 15 mg/kg = 1,615 mg, Intravenous, Clinic/HOD 1 time, 2 of 17 cycles  Administration: 1,615 mg (10/9/2019), 1,615 mg (10/29/2019)        Malignant neoplasm of both ovaries    2/18/2019 Initial Diagnosis     Ovarian cancer      10/8/2019 -  Chemotherapy     Treatment Summary   Plan Name: OP BEVACIZUMAB Q3W   Treatment Goal: Maintenance  Status: Active  Start Date: 10/9/2019  End Date: 9/8/2020 (Planned)  Provider: Oscar Mckeon MD  Chemotherapy: bevacizumab (AVASTIN) 15 mg/kg = 1,615 mg in sodium chloride 0.9% 100 mL chemo infusion, 15 mg/kg = 1,615 mg, Intravenous, Clinic/HOD 1 time, 2 of 17 cycles  Administration: 1,615 mg (10/9/2019), 1,615 mg (10/29/2019)        Ovarian cancer, bilateral    8/15/2019 Initial Diagnosis     Ovarian cancer, bilateral      10/8/2019 -  Chemotherapy     Treatment Summary   Plan Name: OP BEVACIZUMAB Q3W   Treatment Goal: Maintenance  Status: Active  Start Date: 10/9/2019  End Date: 9/8/2020 (Planned)  Provider: Oscar Mckeon MD  Chemotherapy: bevacizumab (AVASTIN) 15 mg/kg = 1,615 mg in sodium chloride 0.9% 100 mL chemo infusion, 15 mg/kg = 1,615 mg,  Intravenous, Clinic/HOD 1 time, 2 of 17 cycles  Administration: 1,615 mg (10/9/2019), 1,615 mg (10/29/2019)       Past Medical History:   Diagnosis Date    Anemia     Anxiety     Arthritis     knees    Cancer     ovarian    CHF (congestive heart failure)     Hx antineoplastic chemotherapy     last 2019    Hyperlipemia     Hypertension     Neck pain     Ovarian cancer 2019    CHEMO     Family History   Problem Relation Age of Onset    Anesthesia problems Other     Breast cancer Maternal Aunt     Breast cancer Paternal Aunt     Ovarian cancer Paternal Aunt     Colon cancer Brother     Thrombophilia Neg Hx      Social History     Socioeconomic History    Marital status:      Spouse name: Not on file    Number of children: Not on file    Years of education: Not on file    Highest education level: Not on file   Occupational History    Not on file   Social Needs    Financial resource strain: Not on file    Food insecurity:     Worry: Not on file     Inability: Not on file    Transportation needs:     Medical: Not on file     Non-medical: Not on file   Tobacco Use    Smoking status: Former Smoker     Packs/day: 1.00     Years: 25.00     Pack years: 25.00     Last attempt to quit: 10/1/2018     Years since quittin.1    Smokeless tobacco: Never Used    Tobacco comment: States started quit 2 months ago after 30 years   Substance and Sexual Activity    Alcohol use: Never     Alcohol/week: 0.0 standard drinks     Frequency: Never     Comment: occasionally  No alcohol 72h prior to sx    Drug use: No    Sexual activity: Not Currently     Partners: Male     Comment: hyst; mut monog   Lifestyle    Physical activity:     Days per week: Not on file     Minutes per session: Not on file    Stress: Not on file   Relationships    Social connections:     Talks on phone: Not on file     Gets together: Not on file     Attends Rastafari service: Not on file     Active member of club or  organization: Not on file     Attends meetings of clubs or organizations: Not on file     Relationship status: Not on file   Other Topics Concern    Not on file   Social History Narrative    Not on file     Past Surgical History:   Procedure Laterality Date    breast reduction  10/02/2018     SECTION      X 1    CYSTOSCOPY W/ URETERAL STENT PLACEMENT Right 2019    Procedure: CYSTOSCOPY, WITH URETERAL STENT INSERTION;  Surgeon: Timmy Santiago IV, MD;  Location: [facility];  Service: Urology;  Laterality: Right;    CYSTOSCOPY W/ URETERAL STENT REMOVAL  10/04/2019    DILATION AND CURETTAGE OF UTERUS      HYSTERECTOMY      RALH for fibroids (still has ovaries)    INSERTION OF VENOUS ACCESS PORT Left 2019    Procedure: INSERTION, VENOUS ACCESS PORT;  Surgeon: Ulisses Monzon MD;  Location: [facility] OR;  Service: General;  Laterality: Left;  Left internal jugular     LYSIS OF ADHESIONS OF URETER N/A 8/15/2019    Procedure: URETEROLYSIS;  Surgeon: Ismael Juarez MD;  Location: [facility];  Service: OB/GYN;  Laterality: N/A;    OMENTECTOMY N/A 8/15/2019    Procedure: OMENTECTOMY;  Surgeon: Ismael Juarez MD;  Location: [facility] OR;  Service: OB/GYN;  Laterality: N/A;    RETROGRADE PYELOGRAPHY Right 2019    Procedure: PYELOGRAM, RETROGRADE;  Surgeon: Timmy Santiago IV, MD;  Location: [facility];  Service: Urology;  Laterality: Right;    ROBOT-ASSISTED LAPAROSCOPIC SALPINGO-OOPHORECTOMY USING DA TIA XI Bilateral 8/15/2019    Procedure: XI ROBOTIC SALPINGO-OOPHORECTOMY;  Surgeon: Ismael Juarez MD;  Location: [facility];  Service: OB/GYN;  Laterality: Bilateral;    TOTAL REDUCTION MAMMOPLASTY  2018    TUBAL LIGATION       Current Outpatient Medications   Medication Sig Dispense Refill    albuterol 90 mcg/actuation inhaler Inhale 2 puffs into the lungs every 4 (four) hours as needed for Wheezing. Rescue 18 g 0    ALPRAZolam (XANAX) 0.25 MG tablet Take 1 tablet (0.25 mg total) by mouth 3 (three) times  daily as needed for Anxiety. 60 tablet 0    amLODIPine (NORVASC) 5 MG tablet Take 1 tablet (5 mg total) by mouth once daily. 30 tablet 11    biotin 1 mg tablet Take 1,000 mcg by mouth once daily.       diclofenac sodium (VOLTAREN) 1 % Gel Apply once a day to back and foot as needed 100 g 1    gabapentin (NEURONTIN) 100 MG capsule Take 1 capsule (100 mg total) by mouth every evening. 30 capsule 11    HYDROcodone-acetaminophen (NORCO) 5-325 mg per tablet Take 1 tablet by mouth every 6 (six) hours as needed. 20 tablet 0    ibuprofen (ADVIL,MOTRIN) 800 MG tablet Take 1 tablet (800 mg total) by mouth 2 (two) times daily. 60 tablet 1    lidocaine-prilocaine (EMLA) cream Apply topically as needed. 30 g 1    multivitamin with minerals tablet Take 1 tablet by mouth once daily.       olmesartan-hydrochlorothiazide (BENICAR HCT) 40-12.5 mg Tab Take 1 tablet by mouth once daily. 90 tablet 0    ondansetron (ZOFRAN-ODT) 8 MG TbDL Take 1 tablet (8 mg total) by mouth every 8 (eight) hours as needed. 30 tablet 5    oxyCODONE-acetaminophen (PERCOCET)  mg per tablet Take 1 tablet by mouth every 8 (eight) hours as needed. 20 tablet 0    prochlorperazine (COMPAZINE) 10 MG tablet Take 1 tablet (10 mg total) by mouth every 6 (six) hours as needed. 30 tablet 5    rosuvastatin (CRESTOR) 10 MG tablet Take 1 tablet (10 mg total) by mouth once daily. 90 tablet 1    sertraline (ZOLOFT) 25 MG tablet Take 1 tablet (25 mg total) by mouth once daily. 30 tablet 11    tolterodine (DETROL) 2 MG Tab Take 1 tablet (2 mg total) by mouth 2 (two) times daily. 60 tablet 11    zinc gluconate 50 mg tablet Take 50 mg by mouth once daily.      ciprofloxacin HCl (CIPRO) 500 MG tablet Take 1 tablet (500 mg total) by mouth 2 (two) times daily. for 3 days 6 tablet 0     No current facility-administered medications for this visit.        Labs:  Lab Results   Component Value Date    WBC 8.50 11/19/2019    HGB 11.2 (L) 11/19/2019    HCT 36.3 (L)  11/19/2019    MCV 92 11/19/2019     11/19/2019     BMP  Lab Results   Component Value Date     11/19/2019    K 3.9 11/19/2019     11/19/2019    CO2 30 (H) 11/19/2019    BUN 16 11/19/2019    CREATININE 0.8 11/19/2019    CALCIUM 9.5 11/19/2019    ANIONGAP 8 11/19/2019    ESTGFRAFRICA >60 11/19/2019    EGFRNONAA >60 11/19/2019     Lab Results   Component Value Date    ALT 33 11/19/2019    AST 29 11/19/2019    ALKPHOS 106 11/19/2019    BILITOT 0.3 11/19/2019       No results found for: IRON, TIBC, FERRITIN, SATURATEDIRO  No results found for: XQPQEJGS23  No results found for: FOLATE  Lab Results   Component Value Date    TSH 1.869 04/19/2013       I have reviewed the radiology reports and examined the scan/xray images.    Review of Systems   Constitutional: Negative.    HENT: Negative.    Eyes: Negative.    Respiratory: Negative.    Cardiovascular: Negative.    Gastrointestinal: Negative.    Endocrine: Negative.    Genitourinary: Negative.    Musculoskeletal: Negative.    Skin: Negative.    Allergic/Immunologic: Negative.    Neurological: Negative.    Hematological: Negative.    Psychiatric/Behavioral: Negative.      ECOG SCORE    0 - Fully active-able to carry on all pre-disease performance without restriction            Objective:     Vitals:    11/19/19 0925   BP: (!) 145/73   Pulse: 77   Temp: 97.5 °F (36.4 °C)   Body mass index is 37.6 kg/m².  Physical Exam   Constitutional: She is oriented to person, place, and time. She appears well-developed and well-nourished.   HENT:   Head: Normocephalic and atraumatic.   Eyes: Conjunctivae and EOM are normal.   Neck: Normal range of motion. Neck supple.   Cardiovascular: Normal rate and regular rhythm.   Pulmonary/Chest: Effort normal and breath sounds normal.   Abdominal: Soft. Bowel sounds are normal.   Musculoskeletal: Normal range of motion.   Neurological: She is alert and oriented to person, place, and time.   Skin: Skin is warm and dry.    Psychiatric: She has a normal mood and affect. Her behavior is normal. Judgment and thought content normal.   Nursing note and vitals reviewed.        Assessment:      1. Malignant neoplasm of ovary, unspecified laterality    2. Osteoarthritis of both knees, unspecified osteoarthritis type    3. Urinary tract infection without hematuria, site unspecified           Plan:     Malignant neoplasm of ovary, unspecified laterality  Hold avastin today due to 3+ proteinuria, resume when improves.  -     CBC auto differential; Future; Expected date: 12/10/2019  -     Comprehensive metabolic panel; Future; Expected date: 12/10/2019  -     Urinalysis; Future; Expected date: 12/10/2019    Osteoarthritis of both knees, unspecified osteoarthritis type  -     diclofenac sodium (VOLTAREN) 1 % Gel; Apply once a day to back and foot as needed  Dispense: 100 g; Refill: 1    -     Uric acid; Future; Expected date: 11/19/2019  -     Ambulatory consult to Rheumatology    Urinary tract infection without hematuria, site unspecified  -     ciprofloxacin HCl (CIPRO) 500 MG tablet; Take 1 tablet (500 mg total) by mouth 2 (two) times daily. for 3 days  Dispense: 6 tablet; Refill: 0

## 2019-11-19 NOTE — PATIENT INSTRUCTIONS
Soleus Stretch (Flexibility)    1. Stand facing a wall from 3 feet away. Take one step toward the wall with your right foot.  2. Place both palms on the wall. Bend both knees and lean forward. Keep both heels on the floor.  3. Hold for 30 to 60 seconds. Then relax both legs. Repeat the exercise 2 times.  4. Switch legs and repeat.  5. Repeat this exercise 3 times a day, or as instructed.     Tip: Dont bounce while youre stretching.   Date Last Reviewed: 3/10/2016  © 4524-5206 [employer]. [address] [place] [address]. All rights reserved. This information is not intended as a substitute for professional medical care. Always follow your healthcare professional's instructions.        Understanding Posterior Tibialis Tenosynovitis    The posterior tibialis tendon runs along the inside of the foot. It connects the calf muscle (posterior tibialis muscle) to bones on the inside of the foot. It helps maintain the arch of the foot. It also gives you stability when you move. Posterior tibialis tenosynovitis is when this tendon becomes inflamed or torn.     How to say it  [id number]   What causes posterior tibialis tenosynovitis?  This condition is often caused by an injury to the tendon. For instance, a fall can tear the tendon. Overuse can also damage it. People who play sports like basketball or tennis a lot may weaken the tendon over time.  Symptoms of posterior tibialis tenosynovitis  The symptoms of this condition include pain and swelling. The pain is usually felt near the tendon, on the inside of the foot and ankle. It often gets worse over time or with an increase in activity. Your arch may eventually fall, leading to a flat foot. Your foot may also start to turn outward.  Treatment for posterior tibialis tenosynovitis  Treatment for this condition depends on the severity of the tear. Treatment may include:  · Rest. You should avoid any activities that cause pain  and swelling. You may also need to limit the amount of weight you put on your injured foot.  · Cold packs. Putting a cold pack on the tendon may reduce pain and swelling.  · Medicine. Over-the-counter pain medicines can reduce pain and swelling.  · Leg cast or walking boot. Severe tears of the posterior tibialis tendon may need to be kept from moving. These devices can help protect the tendon and reduce swelling.  · Shoe insert or brace. Like a leg cast or walking boot, these devices can help protect the tendon as it heals. They may also ease pain.  · Strengthening and stretching exercises. Certain exercises can help you regain strength and flexibility in your foot..  · Surgery. Several surgical choices are available to fix the torn tendon or replace it. But you often do not need surgery unless your symptoms do not get better after trying other treatments for at least 6 months.     When to call your healthcare provider  Call your healthcare provider right away if you have any of these:  · Fever of 100.4°F (38°C) or higher, or as directed  · Pain that gets worse  · Symptoms that dont get better, or get worse  · New symptoms    Date Last Reviewed: 3/10/2016  © 6940-5623 [employer]. [address] [place] [address]. All rights reserved. This information is not intended as a substitute for professional medical care. Always follow your healthcare professional's instructions.        Dorsiflexion/Plantarflexion (Flexibility)    These exercises are for your right foot. Switch sides for your left foot.  6. Sit on a bed or the floor with your right leg out straight.  7. Flex your right foot back, pushing your heel forward and pulling your toes toward you. This is dorsiflexion. Hold for 5 seconds.  8. Then move your foot in the opposite direction, pointing your foot and toes away from you. This is plantarflexion. Hold for 5 seconds.  9. Repeat 5 times, or as instructed.  10. Then slightly bend your  right knee and repeat.  Date Last Reviewed: 3/10/2016  © 8477-0949 [employer]. [address] [place] [address]. All rights reserved. This information is not intended as a substitute for professional medical care. Always follow your healthcare professional's instructions.        Arch retraining    These exercises are for your right foot. Switch sides for your left foot.  11. Sit in a chair or stand with both feet flat on the floor. Press down with the ball of your right foot, but only on the left side of the foot, just under the big toe.  12. Then pull the bottom of your big toe back toward your heel. This should pull up the arch of your foot. Dont flex your toes while doing this. It is a subtle movement of the arch.  13. Hold for 5 seconds. Relax.  Date Last Reviewed: 3/10/2016  © 7797-8517 [employer]. [address] [place] [address]. All rights reserved. This information is not intended as a substitute for professional medical care. Always follow your healthcare professional's instructions.

## 2019-11-19 NOTE — PROGRESS NOTES
Call patient.  Please tell her that I am referring her to Neuro sx.  She has severe disc disease, more at c4-c5, and I want her to be seen. This can explain her pain in her neck and down her arms.

## 2019-11-19 NOTE — PROGRESS NOTES
RHEUMATOLOGY OUTPATIENT CLINIC NOTE    11/19/2019    Attending Rheumatologist: Mario Schwartz  Primary Care Provider: Rossana Ang MD   Physician Requesting Consultation: Oscar Mckeon MD  [address] [place] [address]  Chief Complaint/Reason For Consultation:  Chronic left ankle pain.    Subjective:       HPI  Casie Gaines is a 64 y.o. Black or  female with chronic pain referred for rheumatic evaluation.  Main complaint is chronic bilateral ankle joint pain.  Left worse than the right, worsening over the past few weeks without any particular precipitating event.  Worst in the evening, aggravated by prolonged range of motion/weight bearing, relieved somewhat by rest and OTC pain medication.  Associated with stiffness, but denies association with prolonged morning stiffness, redness, or precipitation/worsening of event with any particular food/beverage intake.      Review of Systems   Constitutional: Negative for chills, fever and malaise/fatigue.   Eyes: Negative for pain and redness.   Respiratory: Negative for cough, hemoptysis and shortness of breath.    Cardiovascular: Negative for chest pain and leg swelling.   Gastrointestinal: Negative for abdominal pain, blood in stool and melena.        No history of IBD on herself or her family.   Genitourinary: Negative for dysuria and hematuria.        Denies history of nephrolithiasis   Musculoskeletal: Positive for joint pain (Ankles, left more than the right.  Mostly mechanical features.). Negative for falls.   Skin: Negative for rash.        No history of psoriasis   Neurological: Negative for tingling and focal weakness.   Psychiatric/Behavioral: Negative for memory loss. The patient does not have insomnia.      Chronic comorbid conditions affecting medical decision making today:  Past Medical History:   Diagnosis Date    Anemia     Anxiety     Arthritis     knees    Cancer     ovarian    CHF (congestive heart  failure)     Hx antineoplastic chemotherapy     last 2019    Hyperlipemia     Hypertension     Neck pain     Ovarian cancer 2019    CHEMO     Past Surgical History:   Procedure Laterality Date    breast reduction  10/02/2018     SECTION      X 1    CYSTOSCOPY W/ URETERAL STENT PLACEMENT Right 2019    Procedure: CYSTOSCOPY, WITH URETERAL STENT INSERTION;  Surgeon: Timmy Santiago IV, MD;  Location: [facility] OR;  Service: Urology;  Laterality: Right;    CYSTOSCOPY W/ URETERAL STENT REMOVAL  10/04/2019    DILATION AND CURETTAGE OF UTERUS      HYSTERECTOMY      RALH for fibroids (still has ovaries)    INSERTION OF VENOUS ACCESS PORT Left 2019    Procedure: INSERTION, VENOUS ACCESS PORT;  Surgeon: Ulisses Monzon MD;  Location: [facility] OR;  Service: General;  Laterality: Left;  Left internal jugular     LYSIS OF ADHESIONS OF URETER N/A 8/15/2019    Procedure: URETEROLYSIS;  Surgeon: Ismael Juarez MD;  Location: [facility];  Service: OB/GYN;  Laterality: N/A;    OMENTECTOMY N/A 8/15/2019    Procedure: OMENTECTOMY;  Surgeon: Ismael Juarez MD;  Location: [facility] OR;  Service: OB/GYN;  Laterality: N/A;    RETROGRADE PYELOGRAPHY Right 2019    Procedure: PYELOGRAM, RETROGRADE;  Surgeon: Timmy Santiago IV, MD;  Location: [facility] OR;  Service: Urology;  Laterality: Right;    ROBOT-ASSISTED LAPAROSCOPIC SALPINGO-OOPHORECTOMY USING DA TIA XI Bilateral 8/15/2019    Procedure: XI ROBOTIC SALPINGO-OOPHORECTOMY;  Surgeon: Ismael Juarez MD;  Location: [facility];  Service: OB/GYN;  Laterality: Bilateral;    TOTAL REDUCTION MAMMOPLASTY  2018    TUBAL LIGATION       Family History   Problem Relation Age of Onset    Anesthesia problems Other     Breast cancer Maternal Aunt     Breast cancer Paternal Aunt     Ovarian cancer Paternal Aunt     Colon cancer Brother     Thrombophilia Neg Hx      Social History     Substance and Sexual Activity   Alcohol Use Never    Alcohol/week: 0.0 standard drinks     Frequency: Never    Comment: occasionally  No alcohol 72h prior to sx     Social History     Tobacco Use   Smoking Status Former Smoker    Packs/day: 1.00    Years: 25.00    Pack years: 25.00    Last attempt to quit: 10/1/2018    Years since quittin.1   Smokeless Tobacco Never Used   Tobacco Comment    States started quit 2 months ago after 30 years     Social History     Substance and Sexual Activity   Drug Use No       Current Outpatient Medications:     albuterol 90 mcg/actuation inhaler, Inhale 2 puffs into the lungs every 4 (four) hours as needed for Wheezing. Rescue, Disp: 18 g, Rfl: 0    ALPRAZolam (XANAX) 0.25 MG tablet, Take 1 tablet (0.25 mg total) by mouth 3 (three) times daily as needed for Anxiety., Disp: 60 tablet, Rfl: 0    amLODIPine (NORVASC) 5 MG tablet, Take 1 tablet (5 mg total) by mouth once daily., Disp: 30 tablet, Rfl: 11    biotin 1 mg tablet, Take 1,000 mcg by mouth once daily. , Disp: , Rfl:     ciprofloxacin HCl (CIPRO) 500 MG tablet, Take 1 tablet (500 mg total) by mouth 2 (two) times daily. for 3 days, Disp: 6 tablet, Rfl: 0    diclofenac sodium (VOLTAREN) 1 % Gel, Apply once a day to back and foot as needed, Disp: 100 g, Rfl: 1    gabapentin (NEURONTIN) 100 MG capsule, Take 1 capsule (100 mg total) by mouth every evening., Disp: 30 capsule, Rfl: 11    HYDROcodone-acetaminophen (NORCO) 5-325 mg per tablet, Take 1 tablet by mouth every 6 (six) hours as needed., Disp: 20 tablet, Rfl: 0    ibuprofen (ADVIL,MOTRIN) 800 MG tablet, Take 1 tablet (800 mg total) by mouth 2 (two) times daily., Disp: 60 tablet, Rfl: 1    lidocaine-prilocaine (EMLA) cream, Apply topically as needed., Disp: 30 g, Rfl: 1    multivitamin with minerals tablet, Take 1 tablet by mouth once daily. , Disp: , Rfl:     olmesartan-hydrochlorothiazide (BENICAR HCT) 40-12.5 mg Tab, Take 1 tablet by mouth once daily., Disp: 90 tablet, Rfl: 0    ondansetron (ZOFRAN-ODT) 8 MG TbDL, Take 1 tablet (8 mg total)  "by mouth every 8 (eight) hours as needed., Disp: 30 tablet, Rfl: 5    oxyCODONE-acetaminophen (PERCOCET)  mg per tablet, Take 1 tablet by mouth every 8 (eight) hours as needed., Disp: 20 tablet, Rfl: 0    prochlorperazine (COMPAZINE) 10 MG tablet, Take 1 tablet (10 mg total) by mouth every 6 (six) hours as needed., Disp: 30 tablet, Rfl: 5    rosuvastatin (CRESTOR) 10 MG tablet, Take 1 tablet (10 mg total) by mouth once daily., Disp: 90 tablet, Rfl: 1    sertraline (ZOLOFT) 25 MG tablet, Take 1 tablet (25 mg total) by mouth once daily., Disp: 30 tablet, Rfl: 11    tolterodine (DETROL) 2 MG Tab, Take 1 tablet (2 mg total) by mouth 2 (two) times daily., Disp: 60 tablet, Rfl: 11    zinc gluconate 50 mg tablet, Take 50 mg by mouth once daily., Disp: , Rfl:      Objective:         Vitals:    11/19/19 1110   BP: (!) 151/86   Pulse: 75     Physical Exam   Constitutional: No distress.   Estimated body mass index is 37.57 kg/m² as calculated from the following:    Height as of this encounter: 5' 7" (1.702 m).    Weight as of this encounter: 108.8 kg (239 lb 13.8 oz).    Wt Readings from Last 1 Encounters:  11/19/19 1110 : 108.8 kg (239 lb 13.8 oz)     HENT:   Head: Normocephalic and atraumatic.   Eyes: Conjunctivae are normal. Pupils are equal, round, and reactive to light.   Neck: Normal range of motion.   Cardiovascular: Normal rate and intact distal pulses.    Pulmonary/Chest: Effort normal. No respiratory distress.   Abdominal: Soft. She exhibits no distension.   Neurological: She is alert.   Antalgic gait.   Skin: No rash noted. No erythema.     Musculoskeletal: Normal range of motion. She exhibits tenderness (Posterior tibialis tendon area left more than the right.  Ankle joints.) and deformity (Pes planus.).   : strong  Swelling ankle joints, mostly pronounced on left side.  No warmth or erythema.  Appears chronic.    No synovitis or significant squeeze tenderness otherwise    AROM: intact  PROM: " intact    Devices used by patient: none       Reviewed old and all outside pertinent medical records available.    All lab results personally reviewed and interpreted by me.  Lab Results   Component Value Date    WBC 8.50 11/19/2019    HGB 11.2 (L) 11/19/2019    HCT 36.3 (L) 11/19/2019    MCV 92 11/19/2019    MCH 28.4 11/19/2019    MCHC 30.9 (L) 11/19/2019    RDW 17.1 (H) 11/19/2019     11/19/2019    MPV 9.4 11/19/2019    PLTEST Appears normal 05/07/2019       Lab Results   Component Value Date     11/19/2019    K 3.9 11/19/2019     11/19/2019    CO2 30 (H) 11/19/2019     11/19/2019    BUN 16 11/19/2019    CALCIUM 9.5 11/19/2019    PROT 6.2 11/19/2019    ALBUMIN 3.1 (L) 11/19/2019    BILITOT 0.3 11/19/2019    AST 29 11/19/2019    ALKPHOS 106 11/19/2019    ALT 33 11/19/2019       Lab Results   Component Value Date    COLORU Yellow 11/19/2019    APPEARANCEUA Hazy (A) 11/19/2019    SPECGRAV >=1.030 (A) 11/19/2019    PHUR 6.0 11/19/2019    PROTEINUA 3+ (A) 11/19/2019    KETONESU Negative 11/19/2019    LEUKOCYTESUR Negative 11/19/2019    NITRITE Positive (A) 11/19/2019    UROBILINOGEN Negative 10/09/2019       No results found for: CRP    No results found for: SEDRATE, ERYTHROCYTES    No results found for: LOYD, RF, SEDRATE    No components found for: 25OHVITDTOT, 66OLXGOL3, 01FKCDLM8, METHODNOTE    No results found for: URICACID    No components found for: TSPOTTB    Rheum Labs:   LOYD negative   ANCA screen negative     Infectious Labs:   n/a     Imaging:  All imaging reviewed and independently  interpreted by me.    X-ray foot February 2019  No acute osseous abnormality.  Degenerative findings present most prevalent at the 1st MTP joint.  Pes planus deformity noted.  Prominent plantar calcaneal enthesophyte present.    X-ray shoulder August 2019  There is mild bilateral AC joint arthrosis, worse on the left.  Bilateral glenohumeral joint spaces appear preserved.    MRI C-spine November  2019  1. Advanced degenerative change of spondylosis as described above with severe spinal stenosis at C3-C4 and C4-C5.  No definite myelomalacia change  2. Moderate spinal stenosis at C5-C6, C6-C7.  3. Severe foraminal stenosis at multiple levels that may correlate to radiculopathy.  This is most pronounced on the right at C4-C5, on the right at C5-C6, on the left at C6-C7, and bilaterally at C7-T1.    MRI lumbar spine November 2019  1. Mild degenerative grade 1 spondylolisthesis at L4-L5.  Minimal spinal stenosis at this level.  2. Right paracentral disc protrusion at L5-S1 slightly posteriorly displaces the descending right S1 spinal nerve roots without gee compression.     ASSESSMENT / PLAN:     Casie Gaines is a 64 y.o. Black or  female with:    1. Osteoarthritis  - history of present illness and current findings mostly consistent with DJD.  - no typical pattern of inflammatory arthritis of crystal arthropathy.    - Features of chronic posterior tibialis dysfunction a pes planus.  - no erosive changes noted on available imaging.  - Discussed and recommended range of motion, flexibility, strengthening exercises  - resting affected joint for brief periods (<12 h),   - PT/OT, joint braces/splints PRN  - stretching, massage, heat/ice  - soft shoes/insoles recommended  - Acetaminophen prn -> standing up to 3 g per day-> NSAIDs short course (if persistent pain)  - Topicals therapy: Capsaicin / NSAIDs   - corticosteroid injection and arthrocentesis recommended, deferred by patient this visit.  - will consider testing for acute phase reactant elevation, and autoantibodies if refractory  - gabapentin trial.  - recommend to reconsider corticosteroid injection/arthrocentesis    2. Other specified counseling  - over 10 minutes spent regarding below topics:  - Immunization counseling done.  - Weight loss counseling done.   - Nutrition and exercise counseling.  - Limitation of alcohol consumption.  -  Regular exercise:  Aerobic and resistance.  - Medication counseling provided.    3. Morbid Obesity  - would benefit from decreasing at least 10% of body weight.  - recommended goal of losing 1 lb per week.    Follow up in about 3 months (around 2/19/2020).    Method of contact with patient concerns: [facility]    Disclaimer:  This note is prepared using voice recognition software and as such is likely to have errors and has not been proof read. Please contact me for questions.     Time spent: 60 minutes in face to face discussion concerning diagnosis, prognosis, review of lab and test results, benefits of treatment as well as management of disease, counseling of patient and coordination of care between various health care providers.  Greater than half the time spent was used for coordination of care and counseling of patient.    Mario Schwartz M.D.  Rheumatology Department   [facility]

## 2019-11-20 ENCOUNTER — OFFICE VISIT (OUTPATIENT)
Dept: GYNECOLOGIC ONCOLOGY | Facility: CLINIC | Age: 64
End: 2019-11-20
Payer: MEDICARE

## 2019-11-20 ENCOUNTER — LAB VISIT (OUTPATIENT)
Dept: LAB | Facility: HOSPITAL | Age: 64
End: 2019-11-20
Attending: OBSTETRICS & GYNECOLOGY
Payer: MEDICARE

## 2019-11-20 VITALS
TEMPERATURE: 98 F | OXYGEN SATURATION: 98 % | DIASTOLIC BLOOD PRESSURE: 83 MMHG | SYSTOLIC BLOOD PRESSURE: 150 MMHG | WEIGHT: 238.31 LBS | HEIGHT: 67 IN | BODY MASS INDEX: 37.4 KG/M2 | HEART RATE: 90 BPM

## 2019-11-20 DIAGNOSIS — C56.1 MALIGNANT NEOPLASM OF RIGHT OVARY: Primary | ICD-10-CM

## 2019-11-20 DIAGNOSIS — I10 HYPERTENSION, UNSPECIFIED TYPE: ICD-10-CM

## 2019-11-20 DIAGNOSIS — E66.01 MORBID OBESITY: ICD-10-CM

## 2019-11-20 DIAGNOSIS — C56.1 MALIGNANT NEOPLASM OF RIGHT OVARY: ICD-10-CM

## 2019-11-20 LAB — CANCER AG125 SERPL-ACNC: 7 U/ML (ref 0–30)

## 2019-11-20 PROCEDURE — 36415 COLL VENOUS BLD VENIPUNCTURE: CPT

## 2019-11-20 PROCEDURE — 99999 PR PBB SHADOW E&M-EST. PATIENT-LVL III: CPT | Mod: PBBFAC,,, | Performed by: OBSTETRICS & GYNECOLOGY

## 2019-11-20 PROCEDURE — 99999 PR PBB SHADOW E&M-EST. PATIENT-LVL III: ICD-10-PCS | Mod: PBBFAC,,, | Performed by: OBSTETRICS & GYNECOLOGY

## 2019-11-20 PROCEDURE — 99215 OFFICE O/P EST HI 40 MIN: CPT | Mod: S$PBB,,, | Performed by: OBSTETRICS & GYNECOLOGY

## 2019-11-20 PROCEDURE — 86304 IMMUNOASSAY TUMOR CA 125: CPT

## 2019-11-20 PROCEDURE — 99215 PR OFFICE/OUTPT VISIT, EST, LEVL V, 40-54 MIN: ICD-10-PCS | Mod: S$PBB,,, | Performed by: OBSTETRICS & GYNECOLOGY

## 2019-11-20 PROCEDURE — 99213 OFFICE O/P EST LOW 20 MIN: CPT | Mod: PBBFAC | Performed by: OBSTETRICS & GYNECOLOGY

## 2019-11-20 NOTE — PROGRESS NOTES
.  REFERRING PROVIDER  No ref. provider found     REASON FOR CONSULT  Casie Gaines  is a 64 y.o.  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    HISTORY OF PRESENT ILLNESS    Please refer below for the patient's tumor history.  The interval history is as follows:  Patient is tolerating p.o. without any nausea vomiting.  She is having regular bowel movements.  She denies any vaginal bleeding or vaginal discharge. She denies any abdominal pain, bloating, or early satiety.    Tumor History:  1. 1/26/129: CT C/A/P: Pleural effusion, bilateral adnexal masses, lymphadenopathy, and omental caking  2. 1/27/19: CT guided omental biopsy  Right ureteral obstruction with indwelling ureteral stent  4. 2/28-6/18/19: T/C/A x6  5. 8/19/19: Robotic BSO/Right radical retroperiotneal dissection and ureterolysis/omentectomy. Complete pathologic response  6. 10/8/19-: Avastin maintenance.  Cycle #2 on 10/29/19 was complicated by 3+ proteinuria.       REVIEW OF SYSTEMS  All systems reviewed and negative except as noted in HPI.    OBJECTIVE   Vitals:    11/20/19 1341   BP: (!) 150/83   Pulse: 90   Temp: 98.3 °F (36.8 °C)      Body mass index is 37.33 kg/m².      1. General: Well appearing, no apparent distress, alert and oriented.  2. Lymph: Neck symmetric without cervical or supraclavicular adenopathy or mass.  3. Lungs: Normal respiratory rate, no accessory muscle use.  4. Cardiac: Normal rate  5. Psych: Normal affect.  6. Abdomen:  non-distended, soft, non-tender, are no masses, no ascites, no hepatosplenomegaly.  7. Skin: Warm, dry, no rashes or lesions.   8. Extremities: Bilateral lower extremities without edema or tenderness.  9. Genitourinary               Pelvic Examination including:                a. External genitalia are normal in appearance. No lesions noted.               b. Urethral meatus is normal size, location, and appearance.               c. Urethra is negative.                d. Bladder is nontender. No masses noted.               e. Vagina has normal mucosa with physiologic discharge. No lesions noted.              f. Uterus absent              g. Adnexa absent   h. Rectum deferred    ECOG status: 1    LABORATORY DATA  Lab data reviewed.    RADIOLOGICAL DATA  Radiology data reviewed.    ASSESSMENT / PLAN     1. Malignant neoplasm of right ovary    2. Morbid obesity    3. Hypertension, unspecified type       -CLYDE  - every 3 months.  Today it was 7.   -Type II CHTN.  Would consider monitoring as an outpatient and averaging SBP/DBP. Could also consider increasing CCB dosage.  Keep SBP < 160.  CVA precautions.  -Discussed ICON 7 and .  Patient is concerned about receiving 22 cycles of Avastin.  Encouraged the patient to maintain Avastin until tolerated.  -If proteinuria is persistently 3+ or greater I would consider a UPCR.  If <= 200 administer Avastin.  However, will leave this to the discretion of Heme/Onc.  -If patient develops early toxicity to Avastin I would also consider somatic BRCA testing to see if she is a candidate for PARP maintenance.  -RTC 3 months.    PATIENT EDUCATION  Ready to learn, no apparent learning barriers were identified; learning preferences include listening. Explained diagnosis and treatment plan; patient expressed understanding of the content.    ADMINISTRATIVE BILLING  This consultation lasted 30 minutes and greater than 50% of was spent in counseling.       Wilner Green

## 2019-12-02 ENCOUNTER — OFFICE VISIT (OUTPATIENT)
Dept: PODIATRY | Facility: CLINIC | Age: 64
End: 2019-12-02
Payer: MEDICARE

## 2019-12-02 VITALS
WEIGHT: 241.5 LBS | HEIGHT: 67 IN | DIASTOLIC BLOOD PRESSURE: 80 MMHG | SYSTOLIC BLOOD PRESSURE: 131 MMHG | RESPIRATION RATE: 17 BRPM | BODY MASS INDEX: 37.9 KG/M2 | HEART RATE: 85 BPM

## 2019-12-02 DIAGNOSIS — Q74.2 ACCESSORY NAVICULAR BONE OF LEFT FOOT: ICD-10-CM

## 2019-12-02 DIAGNOSIS — M76.822 POSTERIOR TIBIAL TENDON DYSFUNCTION, LEFT: Primary | ICD-10-CM

## 2019-12-02 DIAGNOSIS — M25.572 PAIN IN LEFT ANKLE AND JOINTS OF LEFT FOOT: ICD-10-CM

## 2019-12-02 DIAGNOSIS — M24.572 CONTRACTURE, LEFT ANKLE: ICD-10-CM

## 2019-12-02 DIAGNOSIS — M21.42 ACQUIRED PES PLANUS, LEFT: ICD-10-CM

## 2019-12-02 PROCEDURE — 99214 PR OFFICE/OUTPT VISIT, EST, LEVL IV, 30-39 MIN: ICD-10-PCS | Mod: S$PBB,,, | Performed by: PODIATRIST

## 2019-12-02 PROCEDURE — 99999 PR PBB SHADOW E&M-EST. PATIENT-LVL III: CPT | Mod: PBBFAC,,, | Performed by: PODIATRIST

## 2019-12-02 PROCEDURE — 99999 PR PBB SHADOW E&M-EST. PATIENT-LVL III: ICD-10-PCS | Mod: PBBFAC,,, | Performed by: PODIATRIST

## 2019-12-02 PROCEDURE — 99214 OFFICE O/P EST MOD 30 MIN: CPT | Mod: S$PBB,,, | Performed by: PODIATRIST

## 2019-12-02 PROCEDURE — 99213 OFFICE O/P EST LOW 20 MIN: CPT | Mod: PBBFAC | Performed by: PODIATRIST

## 2019-12-02 NOTE — PROGRESS NOTES
Subjective:       Patient ID: Casie Gaines is a 64 y.o. female.    Chief Complaint: Follow-up (boot check, rates pain 4/10, wears tennis, non diabetic, PCP Dr. Ang)      HPI: Casie Gaines presents to the office today for follow-up concerning PTTD of the LLE. The pains are rated as 4/10 and are described as moderate in nature. The pains have been on going now for the past several months, and are worsening.  Patient does continue outpatient physical therapy. The patient states frequent NSAIDs for management. The patient states that prolonged walking and standing exacerbates and causes the symptoms. The patient does state moderate associated swelling as well. Patient's Primary Care Provider is Rossana Ang MD.     Review of patient's allergies indicates:  No Known Allergies    Past Medical History:   Diagnosis Date    Anemia     Anxiety     Arthritis     knees    Cancer     ovarian    CHF (congestive heart failure)     Hx antineoplastic chemotherapy     last 6/2019    Hyperlipemia     Hypertension     Neck pain     Ovarian cancer 2019    CHEMO       Family History   Problem Relation Age of Onset    Anesthesia problems Other     Breast cancer Maternal Aunt     Breast cancer Paternal Aunt     Ovarian cancer Paternal Aunt     Colon cancer Brother     Thrombophilia Neg Hx        Social History     Socioeconomic History    Marital status:      Spouse name: Not on file    Number of children: Not on file    Years of education: Not on file    Highest education level: Not on file   Occupational History    Not on file   Social Needs    Financial resource strain: Not on file    Food insecurity:     Worry: Not on file     Inability: Not on file    Transportation needs:     Medical: Not on file     Non-medical: Not on file   Tobacco Use    Smoking status: Former Smoker     Packs/day: 1.00     Years: 25.00     Pack years: 25.00     Last attempt to quit: 10/1/2018     Years since  quittin.1    Smokeless tobacco: Never Used    Tobacco comment: States started quit 2 months ago after 30 years   Substance and Sexual Activity    Alcohol use: Never     Alcohol/week: 0.0 standard drinks     Frequency: Never     Comment: occasionally  No alcohol 72h prior to sx    Drug use: No    Sexual activity: Not Currently     Partners: Male     Comment: hyst; mut monog   Lifestyle    Physical activity:     Days per week: Not on file     Minutes per session: Not on file    Stress: Not on file   Relationships    Social connections:     Talks on phone: Not on file     Gets together: Not on file     Attends [employer] service: Not on file     Active member of club or organization: Not on file     Attends meetings of clubs or organizations: Not on file     Relationship status: Not on file   Other Topics Concern    Not on file   Social History Narrative    Not on file       Past Surgical History:   Procedure Laterality Date    breast reduction  10/02/2018     SECTION      X 1    CYSTOSCOPY W/ URETERAL STENT PLACEMENT Right 2019    Procedure: CYSTOSCOPY, WITH URETERAL STENT INSERTION;  Surgeon: Timmy Santiago IV, MD;  Location: [facility] OR;  Service: Urology;  Laterality: Right;    CYSTOSCOPY W/ URETERAL STENT REMOVAL  10/04/2019    DILATION AND CURETTAGE OF UTERUS      HYSTERECTOMY      RALH for fibroids (still has ovaries)    INSERTION OF VENOUS ACCESS PORT Left 2019    Procedure: INSERTION, VENOUS ACCESS PORT;  Surgeon: Ulisses Monzon MD;  Location: [facility] OR;  Service: General;  Laterality: Left;  Left internal jugular     LYSIS OF ADHESIONS OF URETER N/A 8/15/2019    Procedure: URETEROLYSIS;  Surgeon: Ismael Juarez MD;  Location: [facility] OR;  Service: OB/GYN;  Laterality: N/A;    OMENTECTOMY N/A 8/15/2019    Procedure: OMENTECTOMY;  Surgeon: Ismael Juarez MD;  Location: [facility] OR;  Service: OB/GYN;  Laterality: N/A;    RETROGRADE PYELOGRAPHY Right 2019    Procedure:  "PYELOGRAM, RETROGRADE;  Surgeon: Timmy Santiago IV, MD;  Location: [facility] OR;  Service: Urology;  Laterality: Right;    ROBOT-ASSISTED LAPAROSCOPIC SALPINGO-OOPHORECTOMY USING DA TIA XI Bilateral 8/15/2019    Procedure: XI ROBOTIC SALPINGO-OOPHORECTOMY;  Surgeon: Ismael Juarez MD;  Location: [facility] OR;  Service: OB/GYN;  Laterality: Bilateral;    TOTAL REDUCTION MAMMOPLASTY  2018    TUBAL LIGATION         Review of Systems   Constitutional: Negative for chills, fatigue and fever.   HENT: Negative for hearing loss.    Eyes: Negative for photophobia and visual disturbance.   Respiratory: Negative for cough, chest tightness, shortness of breath and wheezing.    Cardiovascular: Negative for chest pain and palpitations.   Gastrointestinal: Negative for constipation, diarrhea, nausea and vomiting.   Endocrine: Negative for cold intolerance and heat intolerance.   Genitourinary: Negative for flank pain.   Musculoskeletal: Positive for gait problem. Negative for neck pain and neck stiffness.   Skin: Negative for wound.   Neurological: Negative for light-headedness and headaches.   Psychiatric/Behavioral: Negative for sleep disturbance.         Objective:   /80 (BP Location: Right arm, Patient Position: Sitting, BP Method: Medium (Automatic))   Pulse 85   Resp 17   Ht 5' 7" (1.702 m)   Wt 109.5 kg (241 lb 7.9 oz)   BMI 37.82 kg/m²         Physical Exam    LOWER EXTREMITY PHYSICAL EXAMINATION    VASCULAR: On the left foot, the dorsalis pedis pulse is 2/4 and the posterior tibial pulse is 2/4. Capillary refill time is less than 3 seconds. Hair growth is present on the dorsum of the foot and at the digits. No rubor is present. Proximal to distal temperature is warm to warm.    DERMATOLOGY: Skin is supple, dry and intact. No ecchymosis is noted. No hypertrophic skin formation. No erythema or cellulitis is noted.     ORTHOPEDIC: There is severe collapsing pes planovalgus on the left foot. There is severe " tenderness to palpation of the navicular tuberosity on the left foot. There is severe edema noted along the course of the PT tendon on the left foot. There is moderate retro-malleolar edema (medial malleolus). There is mild pain to palpation at the spring ligament and the deltoid ligaments. There is no apparent pains to palpation of the medial malleolus.  Equinus contracture is noted.  No pain with palpation and/or range of motion of the ankle joint.  There is no crepitus noted with range of motion of the ankle joint. The ankle is not in valgus.  There is no limitation to ROM of the STJ and the MTJ. The hindfoot is in valgus. Upon standing, there is mild royer-talar subluxation noted on the left foot. There is mild forefoot/midfoot abduction on the hindfoot.  RCSP is valgus. The deformity is supple. The patient is able to double heel rise, but has difficulties with single heel rise on the left foot. Gait pattern is antalgic at this time.     NEUROLOGY: Protective sensation is intact via 5.07 [place] monofilament. Proprioception is intact. Sensation to light touch is intact. Upon palpation of the interspaces, there are no neurological sensations stated that radiate proximal or distal. Upon compression of the metatarsal heads from medial to lateral, no neurological sensations or symptoms are stated.    Assessment:     1. Posterior tibial tendon dysfunction, left    2. Pain in left ankle and joints of left foot    3. Contracture, left ankle    4. Acquired pes planus, left    5. Accessory navicular bone of left foot        Plan:     Posterior tibial tendon dysfunction, left    Pain in left ankle and joints of left foot    Contracture, left ankle    Acquired pes planus, left    Accessory navicular bone of left foot        Thorough discussion is had with the patient today, concerning the diagnosis, its etiology, and the treatment algorithm at present.  XRAYS are reviewed in detail with the patient. All questions  and concerns regarding findings and its/their implications are outlined and discussed.  Patient likely has severe tendinopathy of the posterior tibial tendon, left lower extremity, with also likely tendon tearing.  As such, she will need either conservative care with an [place] Brace or surgical intervention.  Patient will ponder over the aforementioned. For now, continue to weight bear and ambulate with the walking boot and continue Tylenol and/or NSAIDs if no overt contraindication.  Patient will follow up in approximately 2 weeks for surgical discussion.    The procedure of (resection of accessory navicular bone with her posterior tibial tendon with flexor endon transfer with MCDO and USMAN) was thoroughly explained to the patient. Its necessity and/or purpose and the implications therein were outlined, including any pertinent advantages and/or disadvantages, and possible complications, if any. Possible complications include recurrence of pathology and/or deformity, infection (cellulitis, drainage, purulence, malodor, etc...), pain, numbness, neuritis, edema, burning, loss of function, need for further surgery, possible need for removal of any implanted hardware, soft tissue contracture and/or scarring, etc... No guarantees were given and/or implied. Post-operative expectations and weightbearing protocol is thoroughly explained the patient, who acknowledges understanding.           Future Appointments   Date Time Provider Department Center   12/5/2019 11:20 AM Rossana Ang MD [facility] IM  Medical C   12/10/2019  1:30 PM BRADFORD JONES CC LAB [facility] LAB DS [facility]   12/10/2019  1:30 PM BRADFORD JONES CC SPECIMEN [facility] SPE LAB [facility]   12/10/2019  2:00 PM Hoda Gilliam MD [facility] HEM ONC [facility]   12/10/2019  2:30 PM CHAIR 03 [facility] CHEMO [facility]   12/11/2019  9:00 AM Zeyad Bradshaw MD [facility] NEUSUR [place]   12/23/2019 10:45 AM Kashif Lion DPM [facility] POD  Medical C   2/19/2020  1:30 PM Wilner Green MD [facility] GYNONC  [facility]   2/21/2020  8:40 AM Mario Schwartz MD [facility]

## 2019-12-04 ENCOUNTER — TELEPHONE (OUTPATIENT)
Dept: INTERNAL MEDICINE | Facility: CLINIC | Age: 64
End: 2019-12-04

## 2019-12-04 NOTE — TELEPHONE ENCOUNTER
----- Message from Nathalia Mustafa sent at 12/4/2019  3:53 PM CST -----  Contact: patient  Calling concerning getting in @ a later time. States the other appt that she need to have should be before seeing PCP. Please call patient @ [phone]. Thanks, artis

## 2019-12-10 ENCOUNTER — OFFICE VISIT (OUTPATIENT)
Dept: HEMATOLOGY/ONCOLOGY | Facility: CLINIC | Age: 64
End: 2019-12-10
Payer: MEDICARE

## 2019-12-10 ENCOUNTER — INFUSION (OUTPATIENT)
Dept: INFUSION THERAPY | Facility: HOSPITAL | Age: 64
End: 2019-12-10
Attending: INTERNAL MEDICINE
Payer: MEDICARE

## 2019-12-10 VITALS
BODY MASS INDEX: 37.72 KG/M2 | SYSTOLIC BLOOD PRESSURE: 163 MMHG | RESPIRATION RATE: 18 BRPM | OXYGEN SATURATION: 95 % | DIASTOLIC BLOOD PRESSURE: 95 MMHG | HEART RATE: 80 BPM | HEIGHT: 67 IN | TEMPERATURE: 97 F | WEIGHT: 240.31 LBS

## 2019-12-10 VITALS — HEART RATE: 79 BPM | SYSTOLIC BLOOD PRESSURE: 150 MMHG | DIASTOLIC BLOOD PRESSURE: 81 MMHG

## 2019-12-10 DIAGNOSIS — C56.3 MALIGNANT NEOPLASM OF BOTH OVARIES: ICD-10-CM

## 2019-12-10 DIAGNOSIS — C56.3 OVARIAN CANCER, BILATERAL: Primary | ICD-10-CM

## 2019-12-10 DIAGNOSIS — C56.9 MALIGNANT NEOPLASM OF OVARY, UNSPECIFIED LATERALITY: Primary | ICD-10-CM

## 2019-12-10 DIAGNOSIS — R80.9 PROTEINURIA, UNSPECIFIED TYPE: ICD-10-CM

## 2019-12-10 DIAGNOSIS — C56.1 MALIGNANT NEOPLASM OF RIGHT OVARY: ICD-10-CM

## 2019-12-10 DIAGNOSIS — I10 HYPERTENSION, UNSPECIFIED TYPE: ICD-10-CM

## 2019-12-10 DIAGNOSIS — C78.6 PERITONEAL CARCINOMATOSIS: ICD-10-CM

## 2019-12-10 DIAGNOSIS — D63.0 ANEMIA IN NEOPLASTIC DISEASE: ICD-10-CM

## 2019-12-10 DIAGNOSIS — D69.6 THROMBOCYTOPENIA: ICD-10-CM

## 2019-12-10 PROCEDURE — 96413 CHEMO IV INFUSION 1 HR: CPT

## 2019-12-10 PROCEDURE — 99999 PR PBB SHADOW E&M-EST. PATIENT-LVL IV: ICD-10-PCS | Mod: PBBFAC,,, | Performed by: INTERNAL MEDICINE

## 2019-12-10 PROCEDURE — 99215 OFFICE O/P EST HI 40 MIN: CPT | Mod: S$PBB,,, | Performed by: INTERNAL MEDICINE

## 2019-12-10 PROCEDURE — 99999 PR PBB SHADOW E&M-EST. PATIENT-LVL IV: CPT | Mod: PBBFAC,,, | Performed by: INTERNAL MEDICINE

## 2019-12-10 PROCEDURE — 99214 OFFICE O/P EST MOD 30 MIN: CPT | Mod: PBBFAC,25 | Performed by: INTERNAL MEDICINE

## 2019-12-10 PROCEDURE — 99215 PR OFFICE/OUTPT VISIT, EST, LEVL V, 40-54 MIN: ICD-10-PCS | Mod: S$PBB,,, | Performed by: INTERNAL MEDICINE

## 2019-12-10 PROCEDURE — 25000003 PHARM REV CODE 250: Performed by: INTERNAL MEDICINE

## 2019-12-10 PROCEDURE — 63600175 PHARM REV CODE 636 W HCPCS: Performed by: INTERNAL MEDICINE

## 2019-12-10 RX ORDER — DIPHENHYDRAMINE HYDROCHLORIDE 50 MG/ML
25 INJECTION INTRAMUSCULAR; INTRAVENOUS EVERY 10 MIN PRN
Status: CANCELLED | OUTPATIENT
Start: 2019-12-10

## 2019-12-10 RX ORDER — SODIUM CHLORIDE 0.9 % (FLUSH) 0.9 %
10 SYRINGE (ML) INJECTION
Status: CANCELLED | OUTPATIENT
Start: 2019-12-10

## 2019-12-10 RX ORDER — HEPARIN 100 UNIT/ML
500 SYRINGE INTRAVENOUS
Status: DISCONTINUED | OUTPATIENT
Start: 2019-12-10 | End: 2019-12-10 | Stop reason: HOSPADM

## 2019-12-10 RX ORDER — HEPARIN 100 UNIT/ML
500 SYRINGE INTRAVENOUS
Status: CANCELLED | OUTPATIENT
Start: 2019-12-10

## 2019-12-10 RX ORDER — EPINEPHRINE 0.3 MG/.3ML
0.3 INJECTION SUBCUTANEOUS ONCE AS NEEDED
Status: CANCELLED | OUTPATIENT
Start: 2019-12-10

## 2019-12-10 RX ORDER — SODIUM CHLORIDE 0.9 % (FLUSH) 0.9 %
10 SYRINGE (ML) INJECTION
Status: DISCONTINUED | OUTPATIENT
Start: 2019-12-10 | End: 2019-12-10 | Stop reason: HOSPADM

## 2019-12-10 RX ORDER — METHYLPREDNISOLONE SOD SUCC 125 MG
125 VIAL (EA) INJECTION ONCE AS NEEDED
Status: CANCELLED | OUTPATIENT
Start: 2019-12-10

## 2019-12-10 RX ADMIN — BEVACIZUMAB 1615 MG: 400 INJECTION, SOLUTION INTRAVENOUS at 03:12

## 2019-12-10 RX ADMIN — HEPARIN 500 UNITS: 100 SYRINGE at 04:12

## 2019-12-10 NOTE — DISCHARGE INSTRUCTIONS
[facility]  [address]  [address] [address]  [phone] phone     [phone] fax  Hours of Operation: Monday- Friday 8:00am- 5:00pm  After hours phone  [phone]  Hematology / Oncology Physicians on call      ASHLEIGH Russ Dr., Dr., Dr., Dr., NP Sydney Prescott, NP Tyesha Taylor, NP    Please call with any concerns regarding your appointment today.

## 2019-12-10 NOTE — NURSING
B/P 165/93 in clinic office.  Dr. Gilliam aware and orders are to proceed with Avastin today. Pt is going to her Primary Care MD after this visit.

## 2019-12-10 NOTE — PROGRESS NOTES
Subjective:      DATE OF VISIT: 12/10/2019   ?   ?   Patient ID:?Casie Gaines is a 64 y.o. female.?? MR#: [id number]   ?   PRIMARY PROVIDER:  Dr. Mckeon  ?   CHIEF COMPLAINT:  Follow-up?????   ?   ONCOLOGIC DIAGNOSIS:  Ovarian carcinoma with peritoneal carcinomatosis  ?   CURRENT TREATMENT:  Maintenance bevacizumab    PAST TREATMENT:  Carboplatin, paclitaxel and bevacizumab    HPI    Today I had the pleasure meeting for the 1st time Ms. Gaines, a 64-year-old woman on maintenance bevacizumab after completing adjuvant therapy for ovarian carcinoma.  She has been doing well and tolerating chemotherapy without notable toxicity.  She denies neuropathy.  She has had high blood pressure and has follow-up with her primary care after our visit today.  Three weeks ago during last visit she was treated for a urinary tract infection from which she was asymptomatic.  She denies dysuria, hematuria, fever, chills or other infectious symptoms.    Review of Systems    ?   A comprehensive 14-point review of systems was reviewed with patient and was negative other than as specified above.   ?     Objective:      Physical Exam      ?   Vitals:    12/10/19 1454   BP: (!) 163/95   Pulse: 80   Resp: 18   Temp: 97 °F (36.1 °C)      ?   ECOG:?0   General appearance: Generally well appearing, in no acute distress.   Head, eyes, ears, nose, and throat: Oropharynx clear with moist mucous membranes.   Cardiovascular: Regular rate and rhythm, S1, S2, no audible murmurs.   Respiratory: Lungs clear to auscultation bilaterally.   Abdomen: nontender, nondistended.   Extremities: Warm, edema in right ankle, chronic.  Neurologic: Alert and oriented. Grossly normal strength, coordination, and gait.   Skin: No rashes, ecchymoses or petechial lesion.   Psychiatric: normal mood and affect, conversant and appropriate    ?   Laboratory:  ?   Lab Visit on 12/10/2019   Component Date Value Ref Range Status    Specimen UA 12/10/2019 Urine, Clean Catch    Final    Color, UA 12/10/2019 Yellow  Yellow, Straw, [place] Final    Appearance, UA 12/10/2019 Clear  Clear Final    pH, UA 12/10/2019 7.0  5.0 - 8.0 Final    Specific Gravity, UA 12/10/2019 1.025  1.005 - 1.030 Final    Protein, UA 12/10/2019 2+* Negative Final    Glucose, UA 12/10/2019 Negative  Negative Final    Ketones, UA 12/10/2019 Negative  Negative Final    Bilirubin (UA) 12/10/2019 Negative  Negative Final    Occult Blood UA 12/10/2019 Negative  Negative Final    Nitrite, UA 12/10/2019 Negative  Negative Final    Urobilinogen, UA 12/10/2019 1.0  <2.0 EU/dL Final    Leukocytes, UA 12/10/2019 Negative  Negative Final    RBC, UA 12/10/2019 0  0 - 4 /hpf Final    WBC, UA 12/10/2019 0  0 - 5 /hpf Final    Bacteria 12/10/2019 Rare  None-Occ /hpf Final    Hyaline Casts, UA 12/10/2019 0  0-1/lpf /lpf Final    Microscopic Comment 12/10/2019 SEE COMMENT   Final   Lab Visit on 12/10/2019   Component Date Value Ref Range Status    WBC 12/10/2019 9.43  3.90 - 12.70 K/uL Final    RBC 12/10/2019 4.22  4.00 - 5.40 M/uL Final    Hemoglobin 12/10/2019 11.8* 12.0 - 16.0 g/dL Final    Hematocrit 12/10/2019 39.4  37.0 - 48.5 % Final    Mean Corpuscular Volume 12/10/2019 93  82 - 98 fL Final    Mean Corpuscular Hemoglobin 12/10/2019 28.0  27.0 - 31.0 pg Final    Mean Corpuscular Hemoglobin Conc 12/10/2019 29.9* 32.0 - 36.0 g/dL Final    RDW 12/10/2019 17.2* 11.5 - 14.5 % Final    Platelets 12/10/2019 226  150 - 350 K/uL Final    MPV 12/10/2019 10.0  9.2 - 12.9 fL Final    Immature Granulocytes 12/10/2019 0.3  0.0 - 0.5 % Final    Gran # (ANC) 12/10/2019 6.4  1.8 - 7.7 K/uL Final    Immature Grans (Abs) 12/10/2019 0.03  0.00 - 0.04 K/uL Final    Lymph # 12/10/2019 2.2  1.0 - 4.8 K/uL Final    Mono # 12/10/2019 0.6  0.3 - 1.0 K/uL Final    Eos # 12/10/2019 0.2  0.0 - 0.5 K/uL Final    Baso # 12/10/2019 0.05  0.00 - 0.20 K/uL Final    nRBC 12/10/2019 0  0 /100 WBC Final    Gran% 12/10/2019 67.9   38.0 - 73.0 % Final    Lymph% 12/10/2019 22.9  18.0 - 48.0 % Final    Mono% 12/10/2019 6.4  4.0 - 15.0 % Final    Eosinophil% 12/10/2019 2.0  0.0 - 8.0 % Final    Basophil% 12/10/2019 0.5  0.0 - 1.9 % Final    Differential Method 12/10/2019 Automated   Final    Sodium 12/10/2019 145  136 - 145 mmol/L Final    Potassium 12/10/2019 4.0  3.5 - 5.1 mmol/L Final    Chloride 12/10/2019 106  95 - 110 mmol/L Final    CO2 12/10/2019 31* 23 - 29 mmol/L Final    Glucose 12/10/2019 92  70 - 110 mg/dL Final    BUN, Bld 12/10/2019 18  8 - 23 mg/dL Final    Creatinine 12/10/2019 0.9  0.5 - 1.4 mg/dL Final    Calcium 12/10/2019 10.5  8.7 - 10.5 mg/dL Final    Total Protein 12/10/2019 7.0  6.0 - 8.4 g/dL Final    Albumin 12/10/2019 3.5  3.5 - 5.2 g/dL Final    Total Bilirubin 12/10/2019 0.5  0.1 - 1.0 mg/dL Final    Alkaline Phosphatase 12/10/2019 128  55 - 135 U/L Final    AST 12/10/2019 42* 10 - 40 U/L Final    ALT 12/10/2019 57* 10 - 44 U/L Final    Anion Gap 12/10/2019 8  8 - 16 mmol/L Final    eGFR if African American 12/10/2019 >60  >60 mL/min/1.73 m^2 Final    eGFR if non African American 12/10/2019 >60  >60 mL/min/1.73 m^2 Final      ?   ?   Assessment/Plan:       1. Malignant neoplasm of ovary, unspecified laterality    2. Peritoneal carcinomatosis    3. Thrombocytopenia    4. Anemia in neoplastic disease    5. Hypertension, unspecified type          Plan:     # ovarian neoplasm with peritoneal carcinomatosis status post adjuvant chemotherapy with carboplatin, paclitaxel and bevacizumab now on maintenance bevacizumab:  Urinalysis with 3+ protein in setting of suspected urinary tract infection treated with antibiotics, repeat urinalysis with 2+ protein.  She has had longstanding hypertension but possible exacerbation with bevacizumab. Blood pressure today 160/90; she notes plan to see primary care today for medical management and therefore will proceed with bevacizumab but did note importance of blood  pressure control and potential consequences of poorly controlled hypertension.  Will need to closely monitor.    # anemia:  Mild, normocytic, continue monitor.    Follow-Up:   In 3 weeks with repeat labs including urinalysis

## 2019-12-11 ENCOUNTER — OFFICE VISIT (OUTPATIENT)
Dept: NEUROSURGERY | Facility: CLINIC | Age: 64
End: 2019-12-11
Payer: MEDICARE

## 2019-12-11 VITALS
HEART RATE: 92 BPM | BODY MASS INDEX: 38.72 KG/M2 | WEIGHT: 246.69 LBS | SYSTOLIC BLOOD PRESSURE: 150 MMHG | DIASTOLIC BLOOD PRESSURE: 86 MMHG | RESPIRATION RATE: 16 BRPM | HEIGHT: 67 IN

## 2019-12-11 DIAGNOSIS — M51.36 DEGENERATIVE DISC DISEASE, LUMBAR: ICD-10-CM

## 2019-12-11 DIAGNOSIS — M50.30 DEGENERATIVE DISC DISEASE, CERVICAL: ICD-10-CM

## 2019-12-11 DIAGNOSIS — R93.7 ABNORMAL X-RAY OF CERVICAL SPINE: Primary | ICD-10-CM

## 2019-12-11 DIAGNOSIS — M43.16 SPONDYLOLISTHESIS, LUMBAR REGION: ICD-10-CM

## 2019-12-11 DIAGNOSIS — M54.12 CERVICAL RADICULOPATHY: ICD-10-CM

## 2019-12-11 PROCEDURE — 99999 PR PBB SHADOW E&M-EST. PATIENT-LVL III: CPT | Mod: PBBFAC,,, | Performed by: NEUROLOGICAL SURGERY

## 2019-12-11 PROCEDURE — 99999 PR PBB SHADOW E&M-EST. PATIENT-LVL III: ICD-10-PCS | Mod: PBBFAC,,, | Performed by: NEUROLOGICAL SURGERY

## 2019-12-11 PROCEDURE — 99213 OFFICE O/P EST LOW 20 MIN: CPT | Mod: PBBFAC | Performed by: NEUROLOGICAL SURGERY

## 2019-12-11 PROCEDURE — 99204 OFFICE O/P NEW MOD 45 MIN: CPT | Mod: S$PBB,,, | Performed by: NEUROLOGICAL SURGERY

## 2019-12-11 PROCEDURE — 99204 PR OFFICE/OUTPT VISIT, NEW, LEVL IV, 45-59 MIN: ICD-10-PCS | Mod: S$PBB,,, | Performed by: NEUROLOGICAL SURGERY

## 2019-12-11 NOTE — LETTER
December 11, 2019      Rossana Ang MD  [address] [place] [address]           [facility]  [address] [address] [address]  Phone: [phone]  Fax: [phone]          Patient: Casie Gaines   MR Number: [id number]   YOB: 1955   Date of Visit: 12/11/2019       Dear Dr. Rossana Ang:    Thank you for referring Casie Gaines to me for evaluation. Attached you will find relevant portions of my assessment and plan of care.    If you have questions, please do not hesitate to call me. I look forward to following Casie Gaines along with you.    Sincerely,    Mounika Varela, TIM    Enclosure  CC:  No Recipients    If you would like to receive this communication electronically, please contact [email or web address] or [phone] to request more information on [employer] access.    For providers and/or their staff who would like to refer a patient to [facility], please contact us through our one-stop-shop provider referral line, [facility], at [phone].    If you feel you have received this communication in error or would no longer like to receive these types of communications, please e-mail [email or web address]

## 2019-12-23 ENCOUNTER — OFFICE VISIT (OUTPATIENT)
Dept: PODIATRY | Facility: CLINIC | Age: 64
End: 2019-12-23
Payer: MEDICARE

## 2019-12-23 VITALS
HEIGHT: 67 IN | WEIGHT: 263.44 LBS | HEART RATE: 85 BPM | SYSTOLIC BLOOD PRESSURE: 150 MMHG | RESPIRATION RATE: 17 BRPM | DIASTOLIC BLOOD PRESSURE: 90 MMHG | BODY MASS INDEX: 41.35 KG/M2

## 2019-12-23 DIAGNOSIS — M24.572 CONTRACTURE, LEFT ANKLE: ICD-10-CM

## 2019-12-23 DIAGNOSIS — M21.42 ACQUIRED PES PLANUS, LEFT: ICD-10-CM

## 2019-12-23 DIAGNOSIS — M76.822 POSTERIOR TIBIAL TENDON DYSFUNCTION, LEFT: Primary | ICD-10-CM

## 2019-12-23 DIAGNOSIS — M19.072 OSTEOARTHRITIS OF LEFT ANKLE OR FOOT: ICD-10-CM

## 2019-12-23 DIAGNOSIS — M25.572 PAIN IN LEFT ANKLE AND JOINTS OF LEFT FOOT: ICD-10-CM

## 2019-12-23 DIAGNOSIS — Q74.2 ACCESSORY NAVICULAR BONE OF LEFT FOOT: ICD-10-CM

## 2019-12-23 DIAGNOSIS — E66.01 MORBID OBESITY: ICD-10-CM

## 2019-12-23 PROCEDURE — 99214 PR OFFICE/OUTPT VISIT, EST, LEVL IV, 30-39 MIN: ICD-10-PCS | Mod: S$PBB,,, | Performed by: PODIATRIST

## 2019-12-23 PROCEDURE — 99214 OFFICE O/P EST MOD 30 MIN: CPT | Mod: S$PBB,,, | Performed by: PODIATRIST

## 2019-12-23 PROCEDURE — 99999 PR PBB SHADOW E&M-EST. PATIENT-LVL III: ICD-10-PCS | Mod: PBBFAC,,, | Performed by: PODIATRIST

## 2019-12-23 PROCEDURE — 99213 OFFICE O/P EST LOW 20 MIN: CPT | Mod: PBBFAC | Performed by: PODIATRIST

## 2019-12-23 PROCEDURE — 99999 PR PBB SHADOW E&M-EST. PATIENT-LVL III: CPT | Mod: PBBFAC,,, | Performed by: PODIATRIST

## 2019-12-23 NOTE — PROGRESS NOTES
Subjective:       Patient ID: Casie Gaines is a 64 y.o. female.    Chief Complaint: Follow-up (Left boot check, rates pain 4/10, wears boots, PCP Dr. Ang)    HPI: Casie Gaines presents to the office today, for follow-up concerning left PTTD.  Patient is ambulatory with a walking boot for the past several months, and does continue outpatient physical therapy.  On average, her pains are 4/10.  States antalgic gait pattern.  Does state swelling as well. She does continue physical therapy twice weekly.  At our last visit 2 weeks ago, we did discuss surgical intervention versus [place] bracing.  Patient presents this afternoon to talk further.  She does continue to have Medicaid, but will be switching to private insurance the first part of next year.    Review of patient's allergies indicates:  No Known Allergies    Past Medical History:   Diagnosis Date    Anemia     Anxiety     Arthritis     knees    Cancer     ovarian    CHF (congestive heart failure)     Hx antineoplastic chemotherapy     last 6/2019    Hyperlipemia     Hypertension     Neck pain     Ovarian cancer 2019    CHEMO       Family History   Problem Relation Age of Onset    Anesthesia problems Other     Breast cancer Maternal Aunt     Breast cancer Paternal Aunt     Ovarian cancer Paternal Aunt     Colon cancer Brother     Thrombophilia Neg Hx        Social History     Socioeconomic History    Marital status:      Spouse name: Not on file    Number of children: Not on file    Years of education: Not on file    Highest education level: Not on file   Occupational History    Not on file   Social Needs    Financial resource strain: Not on file    Food insecurity:     Worry: Not on file     Inability: Not on file    Transportation needs:     Medical: Not on file     Non-medical: Not on file   Tobacco Use    Smoking status: Former Smoker     Packs/day: 1.00     Years: 25.00     Pack years: 25.00     Last attempt to  quit: 10/1/2018     Years since quittin.2    Smokeless tobacco: Never Used    Tobacco comment: States started quit 2 months ago after 30 years   Substance and Sexual Activity    Alcohol use: Never     Alcohol/week: 0.0 standard drinks     Frequency: Never     Comment: occasionally  No alcohol 72h prior to sx    Drug use: No    Sexual activity: Not Currently     Partners: Male     Comment: hyst; mut monog   Lifestyle    Physical activity:     Days per week: Not on file     Minutes per session: Not on file    Stress: Only a little   Relationships    Social connections:     Talks on phone: Not on file     Gets together: Not on file     Attends Jew service: Not on file     Active member of club or organization: Not on file     Attends meetings of clubs or organizations: Not on file     Relationship status: Not on file   Other Topics Concern    Not on file   Social History Narrative    Live w/ spouse and 2 dogs        Past Surgical History:   Procedure Laterality Date    breast reduction  10/02/2018     SECTION      X 1    CYSTOSCOPY W/ URETERAL STENT PLACEMENT Right 2019    Procedure: CYSTOSCOPY, WITH URETERAL STENT INSERTION;  Surgeon: Timmy Santiago IV, MD;  [facility];  Service: Urology;  Laterality: Right;    CYSTOSCOPY W/ URETERAL STENT REMOVAL  10/04/2019    DILATION AND CURETTAGE OF UTERUS      HYSTERECTOMY      RALH for fibroids (still has ovaries)    INSERTION OF VENOUS ACCESS PORT Left 2019    Procedure: INSERTION, VENOUS ACCESS PORT;  Surgeon: Ulisses Monzon MD;  Location: [facility] OR;  Service: General;  Laterality: Left;  Left internal jugular     LYSIS OF ADHESIONS OF URETER N/A 8/15/2019    Procedure: URETEROLYSIS;  Surgeon: Ismael Juarez MD;  Location: [facility] OR;  Service: OB/GYN;  Laterality: N/A;    OMENTECTOMY N/A 8/15/2019    Procedure: OMENTECTOMY;  Surgeon: Ismael Juarez MD;  Location: [facility] OR;  Service: OB/GYN;  Laterality: N/A;    RETROGRADE  "PYELOGRAPHY Right 1/30/2019    Procedure: PYELOGRAM, RETROGRADE;  Surgeon: Timmy Santiago IV, MD;  Location[facility] OR;  Service: Urology;  Laterality: Right;    ROBOT-ASSISTED LAPAROSCOPIC SALPINGO-OOPHORECTOMY USING DA TIA XI Bilateral 8/15/2019    Procedure: XI ROBOTIC SALPINGO-OOPHORECTOMY;  Surgeon: Ismael Juarez MD;  Location: [facility];  Service: OB/GYN;  Laterality: Bilateral;    TOTAL REDUCTION MAMMOPLASTY  2018    TUBAL LIGATION         Review of Systems   Constitutional: Negative for chills, fatigue and fever.   HENT: Negative for hearing loss.    Eyes: Negative for photophobia and visual disturbance.   Respiratory: Negative for cough, chest tightness, shortness of breath and wheezing.    Cardiovascular: Negative for chest pain and palpitations.   Gastrointestinal: Negative for constipation, diarrhea, nausea and vomiting.   Endocrine: Negative for cold intolerance and heat intolerance.   Genitourinary: Negative for flank pain.   Musculoskeletal: Positive for gait problem. Negative for neck pain and neck stiffness.   Skin: Negative for wound.   Neurological: Negative for light-headedness and headaches.   Psychiatric/Behavioral: Negative for sleep disturbance.          Objective:   BP (!) 150/90 (BP Location: Right arm, Patient Position: Sitting, BP Method: Medium (Automatic))   Pulse 85   Resp 17   Ht 5' 7" (1.702 m)   Wt 119.5 kg (263 lb 7.2 oz)   BMI 41.26 kg/m²          Physical Exam  LOWER EXTREMITY PHYSICAL EXAMINATION    DERMATOLOGY: Skin is supple, dry and intact. No ecchymosis is noted. No hypertrophic skin formation. No erythema or cellulitis is noted.     VASCULAR: On the left foot, the dorsalis pedis pulse is 2/4 and the posterior tibial pulse is 2/4. Capillary refill time is less than 3 seconds. Hair growth is present on the dorsum of the foot and at the digits. No rubor is present. Proximal to distal temperature is warm to warm.     ORTHOPEDIC: There is severe collapsing pes " planovalgus on the left foot. There is severe tenderness to palpation of the navicular tuberosity on the left foot. There is severe edema noted along the course of the PT tendon on the left foot. There is moderate retro-malleolar edema (medial malleolus). There is mild pain to palpation at the spring ligament and the deltoid ligaments. There is no apparent pains to palpation of the medial malleolus.  Equinus contracture is noted.  No pain with palpation and/or range of motion of the ankle joint.  There is no crepitus noted with range of motion of the ankle joint. The ankle is not in valgus.  There is no limitation to ROM of the STJ and the MTJ. The hindfoot is in valgus. Upon standing, there is mild royer-talar subluxation noted on the left foot. There is mild forefoot/midfoot abduction on the hindfoot.  RCSP is valgus. The deformity is supple. The patient is able to double heel rise, but has difficulties with single heel rise on the left foot. Gait pattern is antalgic at this time.    NEUROLOGY: Sensation to light touch is intact. Proprioception is intact. Sensation to pin prick is intact. Deep tendon reflexes of the lower extremity are WNL.       Assessment:     1. Posterior tibial tendon dysfunction, left    2. Pain in left ankle and joints of left foot    3. Contracture, left ankle    4. Acquired pes planus, left    5. Accessory navicular bone of left foot    6. Osteoarthritis of left ankle or foot    7. Morbid obesity        Plan:     Posterior tibial tendon dysfunction, left    Pain in left ankle and joints of left foot    Contracture, left ankle    Acquired pes planus, left    Accessory navicular bone of left foot    Osteoarthritis of left ankle or foot    Morbid obesity        Thorough discussion is had with the patient today, concerning the diagnosis, its etiology, and the treatment algorithm at present.  XRAYS are reviewed in detail with the patient. All questions and concerns regarding findings and  its/their implications are outlined and discussed.    The procedure of (left lower extremity flatfoot reconstruction via USMAN with MCDO, PT repair, Kidner procedure, COTTON and FHL transfer) was thoroughly explained to the patient. Its necessity and/or purpose and the implications therein were outlined, including any pertinent advantages and/or disadvantages, and possible complications, if any. Possible complications include recurrence of pathology and/or deformity, infection (cellulitis, drainage, purulence, malodor, etc...), pain, numbness, neuritis, edema, burning, loss of function, need for further surgery, possible need for removal of any implanted hardware, soft tissue contracture and/or scarring, etc... No guarantees were given and/or implied. Post-operative expectations and weightbearing protocol is thoroughly explained the patient, who acknowledges understanding.     For now, continue conservative care with outpatient physical therapy and walking boot as needed.  Patient will ponder over [place] bracing versus surgical intervention.          Future Appointments   Date Time Provider Department Center   12/31/2019  7:50 AM BRADFORD JONES CC LAB [facility] LAB DS [facility]   12/31/2019  8:00 AM BRADFORD JONES CC SPECIMEN [facility] SPE LAB [facility]   12/31/2019  8:20 AM Oscar Mckeon MD [facility] HEM ONC [facility]   12/31/2019  8:45 AM CHAIR 04 [facility] CHEMO [facility]   2/10/2020  9:50 AM [facility] CT1 LIMIT 500 LBS [facility] CT SCAN [facility]   2/12/2020 11:30 AM Zeyad Bradshaw MD [facility] NEUSUR [facility]   2/19/2020  1:30 PM Wilner Green MD [facility] GYNONC [facility]   2/21/2020  8:40 AM Mario Schwartz MD [facility]

## 2019-12-31 ENCOUNTER — LAB VISIT (OUTPATIENT)
Dept: LAB | Facility: HOSPITAL | Age: 64
End: 2019-12-31
Attending: INTERNAL MEDICINE
Payer: MEDICARE

## 2019-12-31 ENCOUNTER — OFFICE VISIT (OUTPATIENT)
Dept: HEMATOLOGY/ONCOLOGY | Facility: CLINIC | Age: 64
End: 2019-12-31
Payer: MEDICARE

## 2019-12-31 VITALS
RESPIRATION RATE: 18 BRPM | DIASTOLIC BLOOD PRESSURE: 96 MMHG | HEART RATE: 86 BPM | TEMPERATURE: 98 F | WEIGHT: 246 LBS | OXYGEN SATURATION: 96 % | HEIGHT: 67 IN | BODY MASS INDEX: 38.61 KG/M2 | SYSTOLIC BLOOD PRESSURE: 155 MMHG

## 2019-12-31 DIAGNOSIS — C56.9 MALIGNANT NEOPLASM OF OVARY, UNSPECIFIED LATERALITY: ICD-10-CM

## 2019-12-31 DIAGNOSIS — C78.6 PERITONEAL CARCINOMATOSIS: ICD-10-CM

## 2019-12-31 DIAGNOSIS — C56.1 MALIGNANT NEOPLASM OF RIGHT OVARY: Primary | ICD-10-CM

## 2019-12-31 LAB
ALBUMIN SERPL BCP-MCNC: 3.5 G/DL (ref 3.5–5.2)
ALP SERPL-CCNC: 112 U/L (ref 55–135)
ALT SERPL W/O P-5'-P-CCNC: 24 U/L (ref 10–44)
ANION GAP SERPL CALC-SCNC: 9 MMOL/L (ref 8–16)
AST SERPL-CCNC: 22 U/L (ref 10–40)
BILIRUB SERPL-MCNC: 0.4 MG/DL (ref 0.1–1)
BUN SERPL-MCNC: 18 MG/DL (ref 8–23)
CALCIUM SERPL-MCNC: 9.8 MG/DL (ref 8.7–10.5)
CHLORIDE SERPL-SCNC: 107 MMOL/L (ref 95–110)
CO2 SERPL-SCNC: 28 MMOL/L (ref 23–29)
CREAT SERPL-MCNC: 0.9 MG/DL (ref 0.5–1.4)
ERYTHROCYTE [DISTWIDTH] IN BLOOD BY AUTOMATED COUNT: 16.9 % (ref 11.5–14.5)
EST. GFR  (AFRICAN AMERICAN): >60 ML/MIN/1.73 M^2
EST. GFR  (NON AFRICAN AMERICAN): >60 ML/MIN/1.73 M^2
GLUCOSE SERPL-MCNC: 117 MG/DL (ref 70–110)
HCT VFR BLD AUTO: 38.4 % (ref 37–48.5)
HGB BLD-MCNC: 11.6 G/DL (ref 12–16)
IMM GRANULOCYTES # BLD AUTO: 0.02 K/UL (ref 0–0.04)
MCH RBC QN AUTO: 27.7 PG (ref 27–31)
MCHC RBC AUTO-ENTMCNC: 30.2 G/DL (ref 32–36)
MCV RBC AUTO: 92 FL (ref 82–98)
NEUTROPHILS # BLD AUTO: 6.6 K/UL (ref 1.8–7.7)
PLATELET # BLD AUTO: 249 K/UL (ref 150–350)
PMV BLD AUTO: 9.5 FL (ref 9.2–12.9)
POTASSIUM SERPL-SCNC: 4 MMOL/L (ref 3.5–5.1)
PROT SERPL-MCNC: 6.7 G/DL (ref 6–8.4)
RBC # BLD AUTO: 4.19 M/UL (ref 4–5.4)
SODIUM SERPL-SCNC: 144 MMOL/L (ref 136–145)
WBC # BLD AUTO: 9.7 K/UL (ref 3.9–12.7)

## 2019-12-31 PROCEDURE — 80053 COMPREHEN METABOLIC PANEL: CPT

## 2019-12-31 PROCEDURE — 99215 OFFICE O/P EST HI 40 MIN: CPT | Mod: S$PBB,,, | Performed by: INTERNAL MEDICINE

## 2019-12-31 PROCEDURE — 99215 PR OFFICE/OUTPT VISIT, EST, LEVL V, 40-54 MIN: ICD-10-PCS | Mod: S$PBB,,, | Performed by: INTERNAL MEDICINE

## 2019-12-31 PROCEDURE — 36415 COLL VENOUS BLD VENIPUNCTURE: CPT

## 2019-12-31 PROCEDURE — 99999 PR PBB SHADOW E&M-EST. PATIENT-LVL V: CPT | Mod: PBBFAC,,, | Performed by: INTERNAL MEDICINE

## 2019-12-31 PROCEDURE — 85027 COMPLETE CBC AUTOMATED: CPT

## 2019-12-31 PROCEDURE — 99999 PR PBB SHADOW E&M-EST. PATIENT-LVL V: ICD-10-PCS | Mod: PBBFAC,,, | Performed by: INTERNAL MEDICINE

## 2019-12-31 PROCEDURE — 99215 OFFICE O/P EST HI 40 MIN: CPT | Mod: PBBFAC | Performed by: INTERNAL MEDICINE

## 2019-12-31 RX ORDER — AMLODIPINE BESYLATE 5 MG/1
10 TABLET ORAL DAILY
Qty: 60 TABLET | Refills: 11 | Status: SHIPPED | OUTPATIENT
Start: 2019-12-31 | End: 2020-08-14 | Stop reason: SDUPTHER

## 2019-12-31 RX ORDER — ALPRAZOLAM 0.25 MG/1
0.25 TABLET ORAL 3 TIMES DAILY PRN
Qty: 60 TABLET | Refills: 0 | Status: SHIPPED | OUTPATIENT
Start: 2019-12-31 | End: 2020-04-02 | Stop reason: SDUPTHER

## 2019-12-31 NOTE — PROGRESS NOTES
Subjective:       Patient ID: Casie Gaines is a 64 y.o. female.    Chief Complaint: Malignant neoplasm of right ovary [C56.1]  HPI: We have an opportunity to see Ms. Casie Gaines in Hematology Oncology clinic at [facility] on 12/30/2019.  Ms. Casie Gaines is a 64 y.o. woman with peritoneal carcinomatosis with malignant right pleural effusion.  Final pathology is consistent with ovarian primary with  2740.  PET scan demonstrates multiple omental and peritoneal avid masses consistent with peritoneal carcinomatosis, extensive retroperitoneal and mediastinal lymphadenopathy, masslike structure in the right iliac fossa likely representing ovarian mass.  Patient has been evaluated by GYN Oncology Dr. Juarez and MD Green with recommendations for him carboplatin/Taxol/Avastin.      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  S/p 6 cycle avastin taxol carboplatin.  Has great IL after 6 cycles. On 8/15/2019 underwent salpingoophorectomy. Pathology showed CR. Currently on maintenance avastin.       Peritoneal carcinomatosis    1/26/2019 Initial Diagnosis     Peritoneal carcinomatosis      2/15/2019 - 7/8/2019 Chemotherapy     Treatment Summary   Plan Name: OP GYN PACLITAXEL CARBOPLATIN (AUC 6) Q3W  Treatment Goal: Palliative  Status: Inactive  Start Date: 2/28/2019  End Date: 6/18/2019  Provider: Will Trevizo Jr., MD  Chemotherapy: bevacizumab (AVASTIN) 15 mg/kg = 1,600 mg in sodium chloride 0.9% 100 mL chemo infusion, 15 mg/kg = 1,600 mg (100 % of original dose 15 mg/kg), Intravenous, Clinic/HOD 1 time, 6 of 6 cycles  Dose modification: 15 mg/kg (original dose 15 mg/kg, Cycle 1)  Administration: 1,600 mg (2/28/2019), 1,600 mg (3/21/2019), 1,600 mg (4/11/2019), 1,600 mg (5/7/2019), 1,600 mg (5/28/2019), 1,510 mg (6/18/2019)  CARBOplatin (PARAPLATIN) 870 mg in sodium chloride 0.9% 337 mL chemo infusion, 870 mg (100 % of original dose 868.8 mg), Intravenous,  Clinic/HOD 1 time, 6 of 6 cycles  Dose modification:   (original dose 868.8 mg, Cycle 1)  Administration: 870 mg (2/28/2019), 870 mg (3/21/2019), 900 mg (4/11/2019), 870 mg (5/7/2019), 660 mg (5/28/2019), 690 mg (6/18/2019)  PACLitaxel (TAXOL) 175 mg/m2 = 396 mg in sodium chloride 0.9% 566 mL chemo infusion, 175 mg/m2 = 396 mg, Intravenous, Clinic/HOD 1 time, 6 of 6 cycles  Dose modification: 140 mg/m2 (80 % of original dose 175 mg/m2, Cycle 5)  Administration: 396 mg (2/28/2019), 396 mg (3/21/2019), 396 mg (4/11/2019), 396 mg (5/7/2019), 318 mg (5/28/2019), 306 mg (6/18/2019)      10/8/2019 -  Chemotherapy     Treatment Summary   Plan Name: OP BEVACIZUMAB Q3W   Treatment Goal: Maintenance  Status: Active  Start Date: 10/9/2019  End Date: 9/9/2020 (Planned)  Provider: Oscar Mckeon MD  Chemotherapy: bevacizumab (AVASTIN) 15 mg/kg = 1,615 mg in sodium chloride 0.9% 100 mL chemo infusion, 15 mg/kg = 1,615 mg, Intravenous, Clinic/HOD 1 time, 3 of 17 cycles  Administration: 1,615 mg (10/9/2019), 1,615 mg (10/29/2019), 1,615 mg (12/10/2019)        Malignant neoplasm of right ovary    2/15/2019 Initial Diagnosis     Malignant neoplasm of right ovary      2/15/2019 - 7/8/2019 Chemotherapy     Treatment Summary   Plan Name: OP GYN PACLITAXEL CARBOPLATIN (AUC 6) Q3W  Treatment Goal: Palliative  Status: Inactive  Start Date: 2/28/2019  End Date: 6/18/2019  Provider: Will Trevizo Jr., MD  Chemotherapy: bevacizumab (AVASTIN) 15 mg/kg = 1,600 mg in sodium chloride 0.9% 100 mL chemo infusion, 15 mg/kg = 1,600 mg (100 % of original dose 15 mg/kg), Intravenous, Clinic/HOD 1 time, 6 of 6 cycles  Dose modification: 15 mg/kg (original dose 15 mg/kg, Cycle 1)  Administration: 1,600 mg (2/28/2019), 1,600 mg (3/21/2019), 1,600 mg (4/11/2019), 1,600 mg (5/7/2019), 1,600 mg (5/28/2019), 1,510 mg (6/18/2019)  CARBOplatin (PARAPLATIN) 870 mg in sodium chloride 0.9% 337 mL chemo infusion, 870 mg (100 % of original dose 868.8 mg),  Intravenous, Clinic/HOD 1 time, 6 of 6 cycles  Dose modification:   (original dose 868.8 mg, Cycle 1)  Administration: 870 mg (2/28/2019), 870 mg (3/21/2019), 900 mg (4/11/2019), 870 mg (5/7/2019), 660 mg (5/28/2019), 690 mg (6/18/2019)  PACLitaxel (TAXOL) 175 mg/m2 = 396 mg in sodium chloride 0.9% 566 mL chemo infusion, 175 mg/m2 = 396 mg, Intravenous, Clinic/HOD 1 time, 6 of 6 cycles  Dose modification: 140 mg/m2 (80 % of original dose 175 mg/m2, Cycle 5)  Administration: 396 mg (2/28/2019), 396 mg (3/21/2019), 396 mg (4/11/2019), 396 mg (5/7/2019), 318 mg (5/28/2019), 306 mg (6/18/2019)      10/8/2019 -  Chemotherapy     Treatment Summary   Plan Name: OP BEVACIZUMAB Q3W   Treatment Goal: Maintenance  Status: Active  Start Date: 10/9/2019  End Date: 9/9/2020 (Planned)  Provider: Oscar Mckeon MD  Chemotherapy: bevacizumab (AVASTIN) 15 mg/kg = 1,615 mg in sodium chloride 0.9% 100 mL chemo infusion, 15 mg/kg = 1,615 mg, Intravenous, Clinic/HOD 1 time, 3 of 17 cycles  Administration: 1,615 mg (10/9/2019), 1,615 mg (10/29/2019), 1,615 mg (12/10/2019)        Malignant neoplasm of both ovaries    2/18/2019 Initial Diagnosis     Ovarian cancer      10/8/2019 -  Chemotherapy     Treatment Summary   Plan Name: OP BEVACIZUMAB Q3W   Treatment Goal: Maintenance  Status: Active  Start Date: 10/9/2019  End Date: 9/9/2020 (Planned)  Provider: Oscar Mckeon MD  Chemotherapy: bevacizumab (AVASTIN) 15 mg/kg = 1,615 mg in sodium chloride 0.9% 100 mL chemo infusion, 15 mg/kg = 1,615 mg, Intravenous, Clinic/HOD 1 time, 3 of 17 cycles  Administration: 1,615 mg (10/9/2019), 1,615 mg (10/29/2019), 1,615 mg (12/10/2019)        Ovarian cancer, bilateral    8/15/2019 Initial Diagnosis     Ovarian cancer, bilateral      10/8/2019 -  Chemotherapy     Treatment Summary   Plan Name: OP BEVACIZUMAB Q3W   Treatment Goal: Maintenance  Status: Active  Start Date: 10/9/2019  End Date: 9/9/2020 (Planned)  Provider: Oscar Mckeon MD  Chemotherapy:  bevacizumab (AVASTIN) 15 mg/kg = 1,615 mg in sodium chloride 0.9% 100 mL chemo infusion, 15 mg/kg = 1,615 mg, Intravenous, Clinic/HOD 1 time, 3 of 17 cycles  Administration: 1,615 mg (10/9/2019), 1,615 mg (10/29/2019), 1,615 mg (12/10/2019)       Past Medical History:   Diagnosis Date    Anemia     Anxiety     Arthritis     knees    Cancer     ovarian    CHF (congestive heart failure)     Hx antineoplastic chemotherapy     last 2019    Hyperlipemia     Hypertension     Neck pain     Ovarian cancer 2019    CHEMO     Family History   Problem Relation Age of Onset    Anesthesia problems Other     Breast cancer Maternal Aunt     Breast cancer Paternal Aunt     Ovarian cancer Paternal Aunt     Colon cancer Brother     Thrombophilia Neg Hx      Social History     Socioeconomic History    Marital status:      Spouse name: Not on file    Number of children: Not on file    Years of education: Not on file    Highest education level: Not on file   Occupational History    Not on file   Social Needs    Financial resource strain: Not on file    Food insecurity:     Worry: Not on file     Inability: Not on file    Transportation needs:     Medical: Not on file     Non-medical: Not on file   Tobacco Use    Smoking status: Former Smoker     Packs/day: 1.00     Years: 25.00     Pack years: 25.00     Last attempt to quit: 10/1/2018     Years since quittin.2    Smokeless tobacco: Never Used    Tobacco comment: States started quit 2 months ago after 30 years   Substance and Sexual Activity    Alcohol use: Never     Alcohol/week: 0.0 standard drinks     Frequency: Never     Comment: occasionally  No alcohol 72h prior to sx    Drug use: No    Sexual activity: Not Currently     Partners: Male     Comment: hyst; mut monog   Lifestyle    Physical activity:     Days per week: Not on file     Minutes per session: Not on file    Stress: Only a little   Relationships    Social connections:     Talks  on phone: Not on file     Gets together: Not on file     Attends Yazdanism service: Not on file     Active member of club or organization: Not on file     Attends meetings of clubs or organizations: Not on file     Relationship status: Not on file   Other Topics Concern    Not on file   Social History Narrative    Live w/ spouse and 2 dogs      Past Surgical History:   Procedure Laterality Date    breast reduction  10/02/2018     SECTION      X 1    CYSTOSCOPY W/ URETERAL STENT PLACEMENT Right 2019    Procedure: CYSTOSCOPY, WITH URETERAL STENT INSERTION;  Surgeon: Timmy Santiago IV, MD;  Location: [facility] OR;  Service: Urology;  Laterality: Right;    CYSTOSCOPY W/ URETERAL STENT REMOVAL  10/04/2019    DILATION AND CURETTAGE OF UTERUS      HYSTERECTOMY      RALH for fibroids (still has ovaries)    INSERTION OF VENOUS ACCESS PORT Left 2019    Procedure: INSERTION, VENOUS ACCESS PORT;  Surgeon: Ulisses Monzon MD;  Location: [facility] OR;  Service: General;  Laterality: Left;  Left internal jugular     LYSIS OF ADHESIONS OF URETER N/A 8/15/2019    Procedure: URETEROLYSIS;  Surgeon: Ismael Juarez MD;  [place] [facility] [place] [facility] [place];  Service: OB/GYN;  Laterality: N/A;    OMENTECTOMY N/A 8/15/2019    Procedure: OMENTECTOMY;  Surgeon: Ismael Juarez MD;  [place] [facility] [place] [place] [facility] [place];  Service: OB/GYN;  Laterality: N/A;    RETROGRADE PYELOGRAPHY Right 2019    Procedure: PYELOGRAM, RETROGRADE;  Surgeon: Timmy Santiago IV, MD;  Location: [facility] OR;  Service: Urology;  Laterality: Right;    ROBOT-ASSISTED LAPAROSCOPIC SALPINGO-OOPHORECTOMY USING DA TIA XI Bilateral 8/15/2019    Procedure: XI ROBOTIC SALPINGO-OOPHORECTOMY;  Surgeon: Ismael Juarez MD;  Location: [facility];  Service: OB/GYN;  Laterality: Bilateral;    TOTAL REDUCTION MAMMOPLASTY  2018    TUBAL LIGATION       Current Outpatient Medications   Medication Sig Dispense Refill    albuterol 90 mcg/actuation inhaler Inhale 2 puffs into the lungs every 4 (four)  hours as needed for Wheezing. Rescue 18 g 0    ALPRAZolam (XANAX) 0.25 MG tablet Take 1 tablet (0.25 mg total) by mouth 3 (three) times daily as needed for Anxiety. 60 tablet 0    amLODIPine (NORVASC) 5 MG tablet Take 1 tablet (5 mg total) by mouth once daily. 30 tablet 11    biotin 1 mg tablet Take 1,000 mcg by mouth once daily.       diclofenac sodium (VOLTAREN) 1 % Gel Apply once a day to back and foot as needed 100 g 1    gabapentin (NEURONTIN) 100 MG capsule Take 1 capsule (100 mg total) by mouth every evening. 30 capsule 11    HYDROcodone-acetaminophen (NORCO) 5-325 mg per tablet Take 1 tablet by mouth every 6 (six) hours as needed. 20 tablet 0    lidocaine-prilocaine (EMLA) cream Apply topically as needed. 30 g 1    multivitamin with minerals tablet Take 1 tablet by mouth once daily.       olmesartan-hydrochlorothiazide (BENICAR HCT) 40-12.5 mg Tab Take 1 tablet by mouth once daily. 90 tablet 0    ondansetron (ZOFRAN-ODT) 8 MG TbDL Take 1 tablet (8 mg total) by mouth every 8 (eight) hours as needed. 30 tablet 5    oxyCODONE-acetaminophen (PERCOCET)  mg per tablet Take 1 tablet by mouth every 8 (eight) hours as needed. 20 tablet 0    prochlorperazine (COMPAZINE) 10 MG tablet Take 1 tablet (10 mg total) by mouth every 6 (six) hours as needed. 30 tablet 5    rosuvastatin (CRESTOR) 10 MG tablet Take 1 tablet (10 mg total) by mouth once daily. 90 tablet 1    sertraline (ZOLOFT) 25 MG tablet Take 1 tablet (25 mg total) by mouth once daily. 30 tablet 11    tolterodine (DETROL) 2 MG Tab Take 1 tablet (2 mg total) by mouth 2 (two) times daily. 60 tablet 11    zinc gluconate 50 mg tablet Take 50 mg by mouth once daily.       No current facility-administered medications for this visit.        Labs:  Lab Results   Component Value Date    WBC 9.43 12/10/2019    HGB 11.8 (L) 12/10/2019    HCT 39.4 12/10/2019    MCV 93 12/10/2019     12/10/2019     BMP  Lab Results   Component Value Date    NA  145 12/10/2019    K 4.0 12/10/2019     12/10/2019    CO2 31 (H) 12/10/2019    BUN 18 12/10/2019    CREATININE 0.9 12/10/2019    CALCIUM 10.5 12/10/2019    ANIONGAP 8 12/10/2019    ESTGFRAFRICA >60 12/10/2019    EGFRNONAA >60 12/10/2019     Lab Results   Component Value Date    ALT 57 (H) 12/10/2019    AST 42 (H) 12/10/2019    ALKPHOS 128 12/10/2019    BILITOT 0.5 12/10/2019       No results found for: IRON, TIBC, FERRITIN, SATURATEDIRO  No results found for: [id number]  No results found for: FOLATE  Lab Results   Component Value Date    TSH 1.869 04/19/2013       I have reviewed the radiology reports and examined the scan/xray images.    Review of Systems   Constitutional: Negative.    HENT: Negative.    Eyes: Negative.    Respiratory: Negative.    Cardiovascular: Negative.    Gastrointestinal: Negative.    Endocrine: Negative.    Genitourinary: Negative.    Musculoskeletal: Negative.    Skin: Negative.    Allergic/Immunologic: Negative.    Neurological: Negative.    Hematological: Negative.    Psychiatric/Behavioral: Negative.      ECOG SCORE    0 - Fully active-able to carry on all pre-disease performance without restriction            Objective:     Vitals:    12/31/19 0844   BP: (!) 155/96   Pulse: 86   Resp: 18   Temp: 97.8 °F (36.6 °C)   Body mass index is 38.53 kg/m².  Physical Exam   Constitutional: She is oriented to person, place, and time. She appears well-developed and well-nourished.   HENT:   Head: Normocephalic and atraumatic.   Eyes: Conjunctivae and EOM are normal.   Neck: Normal range of motion. Neck supple.   Cardiovascular: Normal rate and regular rhythm.   Pulmonary/Chest: Effort normal and breath sounds normal.   Abdominal: Soft. Bowel sounds are normal.   Musculoskeletal: Normal range of motion.   Neurological: She is alert and oriented to person, place, and time.   Skin: Skin is warm and dry.   Psychiatric: She has a normal mood and affect. Her behavior is normal. Judgment and thought  content normal.   Nursing note and vitals reviewed.        Assessment:      1. Malignant neoplasm of right ovary    2. Peritoneal carcinomatosis    3. Malignant neoplasm of ovary, unspecified laterality           Plan:     Malignant neoplasm of right ovary  Currently on adjuvant avastin.  Hold today due to 3+ proteinuria.  -     CBC auto differential; Future; Expected date: 01/21/2020  -     Comprehensive metabolic panel; Future; Expected date: 01/21/2020  -     Urinalysis; Future; Expected date: 01/21/2020    Peritoneal carcinomatosis    Malignant neoplasm of ovary, unspecified laterality  -     ALPRAZolam (XANAX) 0.25 MG tablet; Take 1 tablet (0.25 mg total) by mouth 3 (three) times daily as needed for Anxiety.  Dispense: 60 tablet; Refill: 0  -     amLODIPine (NORVASC) 5 MG tablet; Take 2 tablets (10 mg total) by mouth once daily.  Dispense: 60 tablet; Refill: 11

## 2020-01-02 ENCOUNTER — DOCUMENTATION ONLY (OUTPATIENT)
Dept: HEMATOLOGY/ONCOLOGY | Facility: CLINIC | Age: 65
End: 2020-01-02

## 2020-01-02 ENCOUNTER — PATIENT MESSAGE (OUTPATIENT)
Dept: HEMATOLOGY/ONCOLOGY | Facility: CLINIC | Age: 65
End: 2020-01-02

## 2020-01-21 ENCOUNTER — INFUSION (OUTPATIENT)
Dept: INFUSION THERAPY | Facility: HOSPITAL | Age: 65
End: 2020-01-21
Attending: INTERNAL MEDICINE
Payer: MEDICARE

## 2020-01-21 ENCOUNTER — OFFICE VISIT (OUTPATIENT)
Dept: HEMATOLOGY/ONCOLOGY | Facility: CLINIC | Age: 65
End: 2020-01-21
Payer: MEDICARE

## 2020-01-21 VITALS
OXYGEN SATURATION: 98 % | SYSTOLIC BLOOD PRESSURE: 133 MMHG | DIASTOLIC BLOOD PRESSURE: 82 MMHG | BODY MASS INDEX: 39.16 KG/M2 | WEIGHT: 250 LBS | HEART RATE: 99 BPM | TEMPERATURE: 98 F

## 2020-01-21 DIAGNOSIS — C56.1 MALIGNANT NEOPLASM OF RIGHT OVARY: ICD-10-CM

## 2020-01-21 DIAGNOSIS — C78.6 PERITONEAL CARCINOMATOSIS: ICD-10-CM

## 2020-01-21 DIAGNOSIS — C78.6 PERITONEAL CARCINOMATOSIS: Primary | ICD-10-CM

## 2020-01-21 DIAGNOSIS — C56.3 MALIGNANT NEOPLASM OF BOTH OVARIES: ICD-10-CM

## 2020-01-21 DIAGNOSIS — C56.3 OVARIAN CANCER, BILATERAL: ICD-10-CM

## 2020-01-21 DIAGNOSIS — C56.3 OVARIAN CANCER, BILATERAL: Primary | ICD-10-CM

## 2020-01-21 DIAGNOSIS — Z51.11 ENCOUNTER FOR ANTINEOPLASTIC CHEMOTHERAPY: ICD-10-CM

## 2020-01-21 PROCEDURE — 99215 PR OFFICE/OUTPT VISIT, EST, LEVL V, 40-54 MIN: ICD-10-PCS | Mod: 25,S$PBB,, | Performed by: INTERNAL MEDICINE

## 2020-01-21 PROCEDURE — 25000003 PHARM REV CODE 250: Performed by: INTERNAL MEDICINE

## 2020-01-21 PROCEDURE — 96413 CHEMO IV INFUSION 1 HR: CPT

## 2020-01-21 PROCEDURE — A4216 STERILE WATER/SALINE, 10 ML: HCPCS | Performed by: INTERNAL MEDICINE

## 2020-01-21 PROCEDURE — 99215 OFFICE O/P EST HI 40 MIN: CPT | Mod: 25,S$PBB,, | Performed by: INTERNAL MEDICINE

## 2020-01-21 PROCEDURE — 63600175 PHARM REV CODE 636 W HCPCS: Performed by: INTERNAL MEDICINE

## 2020-01-21 PROCEDURE — 99213 OFFICE O/P EST LOW 20 MIN: CPT | Mod: PBBFAC,25 | Performed by: INTERNAL MEDICINE

## 2020-01-21 PROCEDURE — 99999 PR PBB SHADOW E&M-EST. PATIENT-LVL III: ICD-10-PCS | Mod: PBBFAC,,, | Performed by: INTERNAL MEDICINE

## 2020-01-21 PROCEDURE — 99999 PR PBB SHADOW E&M-EST. PATIENT-LVL III: CPT | Mod: PBBFAC,,, | Performed by: INTERNAL MEDICINE

## 2020-01-21 RX ORDER — HEPARIN 100 UNIT/ML
500 SYRINGE INTRAVENOUS
Status: CANCELLED | OUTPATIENT
Start: 2020-01-21

## 2020-01-21 RX ORDER — EPINEPHRINE 0.3 MG/.3ML
0.3 INJECTION SUBCUTANEOUS ONCE AS NEEDED
Status: CANCELLED | OUTPATIENT
Start: 2020-01-21

## 2020-01-21 RX ORDER — SODIUM CHLORIDE 0.9 % (FLUSH) 0.9 %
10 SYRINGE (ML) INJECTION
Status: CANCELLED | OUTPATIENT
Start: 2020-01-21

## 2020-01-21 RX ORDER — SODIUM CHLORIDE 0.9 % (FLUSH) 0.9 %
10 SYRINGE (ML) INJECTION
Status: DISCONTINUED | OUTPATIENT
Start: 2020-01-21 | End: 2020-01-21 | Stop reason: HOSPADM

## 2020-01-21 RX ORDER — DIPHENHYDRAMINE HYDROCHLORIDE 50 MG/ML
25 INJECTION INTRAMUSCULAR; INTRAVENOUS EVERY 10 MIN PRN
Status: CANCELLED | OUTPATIENT
Start: 2020-01-21

## 2020-01-21 RX ORDER — METHYLPREDNISOLONE SOD SUCC 125 MG
125 VIAL (EA) INJECTION ONCE AS NEEDED
Status: CANCELLED | OUTPATIENT
Start: 2020-01-21

## 2020-01-21 RX ORDER — HEPARIN 100 UNIT/ML
500 SYRINGE INTRAVENOUS
Status: DISCONTINUED | OUTPATIENT
Start: 2020-01-21 | End: 2020-01-21 | Stop reason: HOSPADM

## 2020-01-21 RX ADMIN — HEPARIN 500 UNITS: 100 SYRINGE at 12:01

## 2020-01-21 RX ADMIN — Medication 10 ML: at 12:01

## 2020-01-21 RX ADMIN — BEVACIZUMAB 1615 MG: 400 INJECTION, SOLUTION INTRAVENOUS at 11:01

## 2020-01-21 NOTE — PROGRESS NOTES
Subjective:       Patient ID: Casie Gaines is a 64 y.o. female.    Chief Complaint: Peritoneal carcinomatosis [C78.6, C80.1]  HPI: We have an opportunity to see Ms. Casie Gaines in Hematology Oncology clinic at [facility] on 01/20/2020.  Ms. Casie Gaines is a 64 y.o. woman with peritoneal carcinomatosis with malignant right pleural effusion.  Final pathology is consistent with ovarian primary with  2740.  PET scan demonstrates multiple omental and peritoneal avid masses consistent with peritoneal carcinomatosis, extensive retroperitoneal and mediastinal lymphadenopathy, masslike structure in the right iliac fossa likely representing ovarian mass.  Patient has been evaluated by GYN Oncology Dr. Juarez and MD Green with recommendations for him carboplatin/Taxol/Avastin.      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  S/p 6 cycle avastin taxol carboplatin.  Has great DE after 6 cycles. On 8/15/2019 underwent salpingoophorectomy. Pathology showed CR. Currently on maintenance avastin.       Peritoneal carcinomatosis    1/26/2019 Initial Diagnosis     Peritoneal carcinomatosis      2/15/2019 - 7/8/2019 Chemotherapy     Treatment Summary   Plan Name: OP GYN PACLITAXEL CARBOPLATIN (AUC 6) Q3W  Treatment Goal: Palliative  Status: Inactive  Start Date: 2/28/2019  End Date: 6/18/2019  Provider: Will Trevizo Jr., MD  Chemotherapy: bevacizumab (AVASTIN) 15 mg/kg = 1,600 mg in sodium chloride 0.9% 100 mL chemo infusion, 15 mg/kg = 1,600 mg (100 % of original dose 15 mg/kg), Intravenous, Clinic/HOD 1 time, 6 of 6 cycles  Dose modification: 15 mg/kg (original dose 15 mg/kg, Cycle 1)  Administration: 1,600 mg (2/28/2019), 1,600 mg (3/21/2019), 1,600 mg (4/11/2019), 1,600 mg (5/7/2019), 1,600 mg (5/28/2019), 1,510 mg (6/18/2019)  CARBOplatin (PARAPLATIN) 870 mg in sodium chloride 0.9% 337 mL chemo infusion, 870 mg (100 % of original dose 868.8 mg), Intravenous,  Clinic/HOD 1 time, 6 of 6 cycles  Dose modification:   (original dose 868.8 mg, Cycle 1)  Administration: 870 mg (2/28/2019), 870 mg (3/21/2019), 900 mg (4/11/2019), 870 mg (5/7/2019), 660 mg (5/28/2019), 690 mg (6/18/2019)  PACLitaxel (TAXOL) 175 mg/m2 = 396 mg in sodium chloride 0.9% 566 mL chemo infusion, 175 mg/m2 = 396 mg, Intravenous, Clinic/HOD 1 time, 6 of 6 cycles  Dose modification: 140 mg/m2 (80 % of original dose 175 mg/m2, Cycle 5)  Administration: 396 mg (2/28/2019), 396 mg (3/21/2019), 396 mg (4/11/2019), 396 mg (5/7/2019), 318 mg (5/28/2019), 306 mg (6/18/2019)      10/8/2019 -  Chemotherapy     Treatment Summary   Plan Name: OP BEVACIZUMAB Q3W   Treatment Goal: Maintenance  Status: Active  Start Date: 10/9/2019  End Date: 9/9/2020 (Planned)  Provider: Oscar Mckeon MD  Chemotherapy: bevacizumab (AVASTIN) 15 mg/kg = 1,615 mg in sodium chloride 0.9% 100 mL chemo infusion, 15 mg/kg = 1,615 mg, Intravenous, Clinic/HOD 1 time, 3 of 17 cycles  Administration: 1,615 mg (10/9/2019), 1,615 mg (10/29/2019), 1,615 mg (12/10/2019)        Malignant neoplasm of right ovary    2/15/2019 Initial Diagnosis     Malignant neoplasm of right ovary      2/15/2019 - 7/8/2019 Chemotherapy     Treatment Summary   Plan Name: OP GYN PACLITAXEL CARBOPLATIN (AUC 6) Q3W  Treatment Goal: Palliative  Status: Inactive  Start Date: 2/28/2019  End Date: 6/18/2019  Provider: Will Trevizo Jr., MD  Chemotherapy: bevacizumab (AVASTIN) 15 mg/kg = 1,600 mg in sodium chloride 0.9% 100 mL chemo infusion, 15 mg/kg = 1,600 mg (100 % of original dose 15 mg/kg), Intravenous, Clinic/HOD 1 time, 6 of 6 cycles  Dose modification: 15 mg/kg (original dose 15 mg/kg, Cycle 1)  Administration: 1,600 mg (2/28/2019), 1,600 mg (3/21/2019), 1,600 mg (4/11/2019), 1,600 mg (5/7/2019), 1,600 mg (5/28/2019), 1,510 mg (6/18/2019)  CARBOplatin (PARAPLATIN) 870 mg in sodium chloride 0.9% 337 mL chemo infusion, 870 mg (100 % of original dose 868.8 mg),  Intravenous, Clinic/HOD 1 time, 6 of 6 cycles  Dose modification:   (original dose 868.8 mg, Cycle 1)  Administration: 870 mg (2/28/2019), 870 mg (3/21/2019), 900 mg (4/11/2019), 870 mg (5/7/2019), 660 mg (5/28/2019), 690 mg (6/18/2019)  PACLitaxel (TAXOL) 175 mg/m2 = 396 mg in sodium chloride 0.9% 566 mL chemo infusion, 175 mg/m2 = 396 mg, Intravenous, Clinic/HOD 1 time, 6 of 6 cycles  Dose modification: 140 mg/m2 (80 % of original dose 175 mg/m2, Cycle 5)  Administration: 396 mg (2/28/2019), 396 mg (3/21/2019), 396 mg (4/11/2019), 396 mg (5/7/2019), 318 mg (5/28/2019), 306 mg (6/18/2019)      10/8/2019 -  Chemotherapy     Treatment Summary   Plan Name: OP BEVACIZUMAB Q3W   Treatment Goal: Maintenance  Status: Active  Start Date: 10/9/2019  End Date: 9/9/2020 (Planned)  Provider: Oscar Mckeon MD  Chemotherapy: bevacizumab (AVASTIN) 15 mg/kg = 1,615 mg in sodium chloride 0.9% 100 mL chemo infusion, 15 mg/kg = 1,615 mg, Intravenous, Clinic/HOD 1 time, 3 of 17 cycles  Administration: 1,615 mg (10/9/2019), 1,615 mg (10/29/2019), 1,615 mg (12/10/2019)        Malignant neoplasm of both ovaries    2/18/2019 Initial Diagnosis     Ovarian cancer      10/8/2019 -  Chemotherapy     Treatment Summary   Plan Name: OP BEVACIZUMAB Q3W   Treatment Goal: Maintenance  Status: Active  Start Date: 10/9/2019  End Date: 9/9/2020 (Planned)  Provider: Oscar Mckeon MD  Chemotherapy: bevacizumab (AVASTIN) 15 mg/kg = 1,615 mg in sodium chloride 0.9% 100 mL chemo infusion, 15 mg/kg = 1,615 mg, Intravenous, Clinic/HOD 1 time, 3 of 17 cycles  Administration: 1,615 mg (10/9/2019), 1,615 mg (10/29/2019), 1,615 mg (12/10/2019)        Ovarian cancer, bilateral    8/15/2019 Initial Diagnosis     Ovarian cancer, bilateral      10/8/2019 -  Chemotherapy     Treatment Summary   Plan Name: OP BEVACIZUMAB Q3W   Treatment Goal: Maintenance  Status: Active  Start Date: 10/9/2019  End Date: 9/9/2020 (Planned)  Provider: Oscar Mckeon MD  Chemotherapy:  bevacizumab (AVASTIN) 15 mg/kg = 1,615 mg in sodium chloride 0.9% 100 mL chemo infusion, 15 mg/kg = 1,615 mg, Intravenous, Clinic/HOD 1 time, 3 of 17 cycles  Administration: 1,615 mg (10/9/2019), 1,615 mg (10/29/2019), 1,615 mg (12/10/2019)       Past Medical History:   Diagnosis Date    Anemia     Anxiety     Arthritis     knees    Cancer     ovarian    CHF (congestive heart failure)     Hx antineoplastic chemotherapy     last 2019    Hyperlipemia     Hypertension     Neck pain     Ovarian cancer 2019    CHEMO     Family History   Problem Relation Age of Onset    Anesthesia problems Other     Breast cancer Maternal Aunt     Breast cancer Paternal Aunt     Ovarian cancer Paternal Aunt     Colon cancer Brother     Thrombophilia Neg Hx      Social History     Socioeconomic History    Marital status:      Spouse name: Not on file    Number of children: Not on file    Years of education: Not on file    Highest education level: Not on file   Occupational History    Not on file   Social Needs    Financial resource strain: Not on file    Food insecurity:     Worry: Not on file     Inability: Not on file    Transportation needs:     Medical: Not on file     Non-medical: Not on file   Tobacco Use    Smoking status: Former Smoker     Packs/day: 1.00     Years: 25.00     Pack years: 25.00     Last attempt to quit: 10/1/2018     Years since quittin.3    Smokeless tobacco: Never Used    Tobacco comment: States started quit 2 months ago after 30 years   Substance and Sexual Activity    Alcohol use: Never     Alcohol/week: 0.0 standard drinks     Frequency: Never     Comment: occasionally  No alcohol 72h prior to sx    Drug use: No    Sexual activity: Not Currently     Partners: Male     Comment: hyst; mut monog   Lifestyle    Physical activity:     Days per week: Not on file     Minutes per session: Not on file    Stress: Only a little   Relationships    Social connections:     Talks  on phone: Not on file     Gets together: Not on file     Attends Restorationism service: Not on file     Active member of club or organization: Not on file     Attends meetings of clubs or organizations: Not on file     Relationship status: Not on file   Other Topics Concern    Not on file   Social History Narrative    Live w/ spouse and 2 dogs      Past Surgical History:   Procedure Laterality Date    breast reduction  10/02/2018     SECTION      X 1    CYSTOSCOPY W/ URETERAL STENT PLACEMENT Right 2019    Procedure: CYSTOSCOPY, WITH URETERAL STENT INSERTION;  Surgeon: Timmy Santiago IV, MD;  Location: [facility];  Service: Urology;  Laterality: Right;    CYSTOSCOPY W/ URETERAL STENT REMOVAL  10/04/2019    DILATION AND CURETTAGE OF UTERUS      HYSTERECTOMY      RALH for fibroids (still has ovaries)    INSERTION OF VENOUS ACCESS PORT Left 2019    Procedure: INSERTION, VENOUS ACCESS PORT;  Surgeon: Ulisses Monzon MD;  Location: [facility] OR;  Service: General;  Laterality: Left;  Left internal jugular     LYSIS OF ADHESIONS OF URETER N/A 8/15/2019    Procedure: URETEROLYSIS;  Surgeon: Ismael Juarez MD;  Location: [facility] OR;  Service: OB/GYN;  Laterality: N/A;    OMENTECTOMY N/A 8/15/2019    Procedure: OMENTECTOMY;  Surgeon: Ismael Juarez MD;  Location: [facility] OR;  Service: OB/GYN;  Laterality: N/A;    RETROGRADE PYELOGRAPHY Right 2019    Procedure: PYELOGRAM, RETROGRADE;  Surgeon: Timmy Santiago IV, MD;  Location: [facility] OR;  Service: Urology;  Laterality: Right;    ROBOT-ASSISTED LAPAROSCOPIC SALPINGO-OOPHORECTOMY USING DA TIA XI Bilateral 8/15/2019    Procedure: XI ROBOTIC SALPINGO-OOPHORECTOMY;  Surgeon: Ismael Juarez MD;  Location[facility];  Service: OB/GYN;  Laterality: Bilateral;    TOTAL REDUCTION MAMMOPLASTY  2018    TUBAL LIGATION       Current Outpatient Medications   Medication Sig Dispense Refill    albuterol 90 mcg/actuation inhaler Inhale 2 puffs into the lungs every 4 (four)  hours as needed for Wheezing. Rescue 18 g 0    ALPRAZolam (XANAX) 0.25 MG tablet Take 1 tablet (0.25 mg total) by mouth 3 (three) times daily as needed for Anxiety. 60 tablet 0    amLODIPine (NORVASC) 5 MG tablet Take 2 tablets (10 mg total) by mouth once daily. 60 tablet 11    biotin 1 mg tablet Take 1,000 mcg by mouth once daily.       diclofenac sodium (VOLTAREN) 1 % Gel Apply once a day to back and foot as needed 100 g 1    gabapentin (NEURONTIN) 100 MG capsule Take 1 capsule (100 mg total) by mouth every evening. 30 capsule 11    HYDROcodone-acetaminophen (NORCO) 5-325 mg per tablet Take 1 tablet by mouth every 6 (six) hours as needed. 20 tablet 0    lidocaine-prilocaine (EMLA) cream Apply topically as needed. 30 g 1    multivitamin with minerals tablet Take 1 tablet by mouth once daily.       olmesartan-hydrochlorothiazide (BENICAR HCT) 40-12.5 mg Tab Take 1 tablet by mouth once daily. 90 tablet 0    ondansetron (ZOFRAN-ODT) 8 MG TbDL Take 1 tablet (8 mg total) by mouth every 8 (eight) hours as needed. 30 tablet 5    oxyCODONE-acetaminophen (PERCOCET)  mg per tablet Take 1 tablet by mouth every 8 (eight) hours as needed. 20 tablet 0    prochlorperazine (COMPAZINE) 10 MG tablet Take 1 tablet (10 mg total) by mouth every 6 (six) hours as needed. 30 tablet 5    rosuvastatin (CRESTOR) 10 MG tablet Take 1 tablet (10 mg total) by mouth once daily. 90 tablet 1    sertraline (ZOLOFT) 25 MG tablet Take 1 tablet (25 mg total) by mouth once daily. 30 tablet 11    tolterodine (DETROL) 2 MG Tab Take 1 tablet (2 mg total) by mouth 2 (two) times daily. 60 tablet 11    zinc gluconate 50 mg tablet Take 50 mg by mouth once daily.       No current facility-administered medications for this visit.        Labs:  Lab Results   Component Value Date    WBC 9.70 12/31/2019    HGB 11.6 (L) 12/31/2019    HCT 38.4 12/31/2019    MCV 92 12/31/2019     12/31/2019     BMP  Lab Results   Component Value Date    NA  144 12/31/2019    K 4.0 12/31/2019     12/31/2019    CO2 28 12/31/2019    BUN 18 12/31/2019    CREATININE 0.9 12/31/2019    CALCIUM 9.8 12/31/2019    ANIONGAP 9 12/31/2019    ESTGFRAFRICA >60 12/31/2019    EGFRNONAA >60 12/31/2019     Lab Results   Component Value Date    ALT 24 12/31/2019    AST 22 12/31/2019    ALKPHOS 112 12/31/2019    BILITOT 0.4 12/31/2019       No results found for: IRON, TIBC, FERRITIN, SATURATEDIRO  No results found for: [id number]  No results found for: FOLATE  Lab Results   Component Value Date    TSH 1.869 04/19/2013       I have reviewed the radiology reports and examined the scan/xray images.    Review of Systems   Constitutional: Negative.    HENT: Negative.    Eyes: Negative.    Respiratory: Negative.    Cardiovascular: Negative.    Gastrointestinal: Negative.    Endocrine: Negative.    Genitourinary: Negative.    Musculoskeletal: Negative.    Skin: Negative.    Allergic/Immunologic: Negative.    Neurological: Negative.    Hematological: Negative.    Psychiatric/Behavioral: Negative.      ECOG SCORE    0 - Fully active-able to carry on all pre-disease performance without restriction            Objective:     Vitals:    01/21/20 0955   BP: 133/82   Pulse: 99   Temp: 97.6 °F (36.4 °C)   Body mass index is 39.16 kg/m².  Physical Exam   Constitutional: She is oriented to person, place, and time. She appears well-developed and well-nourished.   HENT:   Head: Normocephalic and atraumatic.   Eyes: Conjunctivae and EOM are normal.   Neck: Normal range of motion. Neck supple.   Cardiovascular: Normal rate and regular rhythm.   Pulmonary/Chest: Effort normal and breath sounds normal.   Abdominal: Soft. Bowel sounds are normal.   Musculoskeletal: Normal range of motion.   Neurological: She is alert and oriented to person, place, and time.   Skin: Skin is warm and dry.   Psychiatric: She has a normal mood and affect. Her behavior is normal. Judgment and thought content normal.   Nursing  note and vitals reviewed.        Assessment:      1. Peritoneal carcinomatosis    2. Malignant neoplasm of right ovary    3. Ovarian cancer, bilateral    4. Encounter for antineoplastic chemotherapy           Plan:     Peritoneal carcinomatosis  Continue on adjuvant avastin 4th treatment today.  Monitor HTN, and proteinuria.  -     Comprehensive metabolic panel; Future; Expected date: 02/11/2020  -     CBC auto differential; Future; Expected date: 02/11/2020  -     Urinalysis; Future; Expected date: 02/11/2020  -     CT Chest Abdoment Pelvis With Contrast; Future; Expected date: 01/21/2020    Malignant neoplasm of right ovary    Ovarian cancer, bilateral    Encounter for antineoplastic chemotherapy

## 2020-01-21 NOTE — PROGRESS NOTES
Subjective:      Patient ID: Casie Gaines is a 64 y.o. female.    Chief Complaint: Cervical Spine Pain (C-spine) (cervicalgia neck ) and Lumbar Spine Pain (L-Spine) (lbp )    Patient is here today as a new evaluation for neck and lower back pain with an MRI of both    Symptoms began insidiously worsening in severity over the past 3-6 months    In terms of her neck pain she has neck pain 3/10 to 6/10 in severity  Pain primarily radiates in the shoulder.  She has had x-rays of the shoulder in the past    Worse with activity   Radiates into the upper extremities into the hands with the right side being worse than the left  She describes the symptoms as an aching type sensation  Noted difficulty with fine motor tasks  No trouble with dropping things or balance    In terms of her lower back pain  She has pain rated 3/10 in the lower back  Occasionally she will get symptoms down the lower extremities  Right-sided worse than the left  There is no numbness and tingling associated with the symptoms  No weakness  Ambulates unassisted  No previous lumbar spine surgery    Review of Systems   Constitutional: Negative for activity change, appetite change and chills.   HENT: Negative for congestion, ear pain, hearing loss, sore throat and tinnitus.    Eyes: Negative for photophobia, pain, discharge, redness, itching and visual disturbance.   Respiratory: Negative for apnea and chest tightness.    Cardiovascular: Negative for chest pain.   Gastrointestinal: Negative for abdominal distention and abdominal pain.   Endocrine: Negative for cold intolerance and heat intolerance.   Genitourinary: Negative for difficulty urinating and dysuria.   Musculoskeletal: Positive for back pain, gait problem, myalgias, neck pain and neck stiffness. Negative for arthralgias.   Skin: Negative for color change.   Allergic/Immunologic: Negative for environmental allergies.   Neurological: Positive for numbness. Negative for dizziness, tremors,  light-headedness and headaches.   Hematological: Negative for adenopathy. Does not bruise/bleed easily.   Psychiatric/Behavioral: Negative for agitation, behavioral problems and confusion.         Objective:       Neurosurgery Physical Exam  Ortho/SPM Exam    Nursing note and vitals reviewed  Gen:Oriented to person, place, and time.             Appears stated age   Head:Normocephalic and atraumatic.  Nose: Nose normal.    Eyes: EOM are normal. Pupils are equal, round, and reactive to light.   Neck: Neck supple. No masses or lesions palpated  Cardiovascular: Intact distal pulses.    Abdominal: Soft.   Neurological: Alert and oriented to person, place, and time.  No cranial nerve deficit.  Coordination normal. Normal muscle tone  Psychiatric: Normal mood and affect. Behavior is normal.      Neck:  None Tenderness bilaterally Paraspinal tenderness   None  Paraspinal muscle spasms   None Limited secondary to pain Pain with flexion and extention   WNL Limited secondary to pain Range of motion shoulders   Neg  Spurling's sign     Motor:   Right Right Left Left  Level Group   5 4 5 4 C5 Deltoid   5 4 5 4 C6 Bicep   5  5   Wrist extension    5  5  C7 Triceps   5  5   Wrist flexion   5  5  C8    5  5  T1 Interossei      Sensation:  NL Decreased (R/L/BL) Level Sensation    X  C5 Lateral upper arm   X  C6 Thumb and index finger, lat forearm   X  C7 Middle finger   X  C8 Ring and little finger   X  T1 Medial arm      Reflex:  2+  Bicep tendon   2+  Brachioradialis   2+  Triceps tendon   Not present  Hopson's   none  Clonus   neg  Tinel's       Back:  None  Paraspinal tenderness   None  Paraspinal muscle spasms   None  Pain with flexion and extention   WNL  Range of motion    Neg  Straight leg raise     Motor:   Right Right Left Left  Level Group   5  5  L2 Hip flexor (Psoas)   5  5  L3 Leg extension (Quads)   5  5  L4 Dorsiflexion & foot inversion (Tibialis Anterior)   5  5  L5 Great toe extension ( EHL)   5  5  S1 Foot  eversion (Gastroc, PL & PB)     Sensation:  NL Decreased (R/L/BL) Level Sensation    X  L2 Anterio-medial thigh   X  L3 Medial thigh around knee   X  L4 Medial foot   X  L5 Dorsum foot   X  S1 Lateral foot     Reflex:  2+  Patellar tendon (L4)   2+  Achilles tendon (S1)     MRI cervical spine  1. Advanced degenerative change of spondylosis as described above with severe spinal stenosis at C3-C4 and C4-C5.  No definite myelomalacia change although please correlate clinically for myelopathic signs.  2. Moderate spinal stenosis at C5-C6, C6-C7.  3. Severe foraminal stenosis at multiple levels that may correlate to radiculopathy.  This is most pronounced on the right at C4-C5, on the right at C5-C6, on the left at C6-C7, and bilaterally at C7-T1    MRI lumbar spine  1. Mild degenerative grade 1 spondylolisthesis at L4-L5.  Minimal spinal stenosis at this level.  2. Right paracentral disc protrusion at L5-S1 slightly posteriorly displaces the descending right S1 spinal nerve roots without gee compression.  Assessment:     1. Abnormal x-ray of cervical spine    2. Spondylolisthesis, lumbar region    3. Degenerative disc disease, cervical    4. Degenerative disc disease, lumbar    5. Cervical radiculopathy      Plan:     Abnormal x-ray of cervical spine  -     Cancel: CT Cervical Spine Without Contrast; Future; Expected date: 12/11/2019  -     CT Cervical Spine Without Contrast; Future; Expected date: 12/11/2019    Spondylolisthesis, lumbar region    Degenerative disc disease, cervical    Degenerative disc disease, lumbar    Cervical radiculopathy     Patient is having both lower back as well as cervical spine pain  The neck pain is worse than the back pain  She has degenerative changes throughout however advanced degenerative cervical disc disease which could be affecting the upper extremities  Will order a CT scan to have a better evaluation of the anatomy  Will see her back in the office after the CT scan is  complete  She has a degenerative spondylolisthesis in the lumbar spine however the symptoms are managed conservatively at this time    Thank you for the referral   Please call with any questions    Zeyad Bradshaw MD  Neurosurgery

## 2020-01-21 NOTE — DISCHARGE INSTRUCTIONS
[facility]  [address] [facility]  [address]  [phone] phone     [phone] fax  Hours of Operation: Monday- Friday 8:00am- 5:00pm  After hours phone  [phone]  Hematology / Oncology Physicians on call      Dr. Varinder Perrin, ASHLEIGH Espinosa NP Tyesha Taylor, NP    Please call with any concerns regarding your appointment today.FALL PREVENTION   Falls often occur due to slipping, tripping or losing your balance. Here are ways to reduce your risk of falling again.   Was there anything that caused your fall that can be fixed, removed or replaced?   Make your home safe by keeping walkways clear of objects you may trip over.   Use non-slip pads under rugs.   Do not walk in poorly lit areas.   Do not stand on chairs or wobbly ladders.   Use caution when reaching overhead or looking upward. This position can cause a loss of balance.   Be sure your shoes fit properly, have non-slip bottoms and are in good condition.   Be cautious when going up and down stairs, curbs, and when walking on uneven sidewalks.   If your balance is poor, consider using a cane or walker.   If your fall was related to alcohol use, stop or limit alcohol intake.   If your fall was related to use of sleeping medicines, talk to your doctor about this. You may need to reduce your dosage at bedtime if you awaken during the night to go to the bathroom.   To reduce the need for nighttime bathroom trips:   Avoid drinking fluids for several hours before going to bed   Empty your bladder before going to bed   Men can keep a urinal at the bedside   © 1433-2311 [facility] [facility], [address] [place] [address], [place] [address]. All rights reserved. This information is not intended as a substitute for professional medical care. Always follow your healthcare professional's instructions.  Support Groups/Classes    Support groups  "and classes are being offered at the   [facility] and [facility]!!    "Cooking with Cancer" (Nutrition Class):  Second Wednesday of each month   at noon at the Cancer Center.  Metastatic Support Group:  Third Tuesday of each month   at noon at the Cancer Center.  Next Steps Class/Group: Second and fourth Thursday of each month at noon at the Cancer Center.  [facility] (Breast Cancer Support Group): First Tuesday of each month   at 5:30pm at the [facility] location.  [employer]: [facility]: Second and third Tuesday of each month from 7:30am - 2pm.  [facility]: First and fourth Tuesday of each month from 7:30am - 2pm    If you are interested in attending or would like more information please ask our social workers or your nurse!WAYS TO HELP PREVENT INFECTION         WASH YOUR HANDS OFTEN DURING THE DAY, ESPECIALLY BEFORE YOU EAT, AFTER USING THE BATHROOM, AND AFTER TOUCHING ANIMALS     STAY AWAY FROM PEOPLE WHO HAVE ILLNESSES YOU CAN CATCH; SUCH AS COLDS, FLU, CHICKEN POX     TRY TO AVOID CROWDS     STAY AWAY FROM CHILDREN WHO RECENTLY HAVE RECEIVED LIVE VIRUS VACCINES     MAINTAIN GOOD MOUTH CARE     DO NOT SQUEEZE OR SCRATCH PIMPLES     CLEAN CUTS & SCRAPES RIGHT AWAY AND DAILY UNTIL HEALED WITH WARM WATER, SOAP & AN ANTISEPTIC     AVOID CONTACT WITH LITTER BOXES, BIRD CAGES, & FISH TANKS     AVOID STANDING WATER, IE., BIRD BATHS, FLOWER POTS/VASES, OR HUMIDIFIERS     WEAR GLOVES WHEN GARDENING OR CLEANING UP AFTER OTHERS, ESPECIALLY BABIES & SMALL CHILDREN    DO NOT EAT RAW FISH, SEAFOOD, MEAT, OR EGGS  "

## 2020-02-03 ENCOUNTER — TELEPHONE (OUTPATIENT)
Dept: RADIOLOGY | Facility: HOSPITAL | Age: 65
End: 2020-02-03

## 2020-02-04 ENCOUNTER — HOSPITAL ENCOUNTER (OUTPATIENT)
Dept: RADIOLOGY | Facility: HOSPITAL | Age: 65
Discharge: HOME | End: 2020-02-04
Attending: INTERNAL MEDICINE
Payer: MEDICARE

## 2020-02-04 ENCOUNTER — HOSPITAL ENCOUNTER (OUTPATIENT)
Dept: RADIOLOGY | Facility: HOSPITAL | Age: 65
Discharge: HOME | End: 2020-02-04
Attending: NEUROLOGICAL SURGERY
Payer: MEDICARE

## 2020-02-04 DIAGNOSIS — R93.7 ABNORMAL X-RAY OF CERVICAL SPINE: ICD-10-CM

## 2020-02-04 DIAGNOSIS — C78.6 PERITONEAL CARCINOMATOSIS: ICD-10-CM

## 2020-02-04 PROCEDURE — 72125 CT CERVICAL SPINE WITHOUT CONTRAST: ICD-10-PCS | Mod: 26,,, | Performed by: RADIOLOGY

## 2020-02-04 PROCEDURE — 74177 CT ABD & PELVIS W/CONTRAST: CPT | Mod: TC

## 2020-02-04 PROCEDURE — 72125 CT NECK SPINE W/O DYE: CPT | Mod: TC

## 2020-02-04 PROCEDURE — 74177 CT ABD & PELVIS W/CONTRAST: CPT | Mod: 26,,, | Performed by: RADIOLOGY

## 2020-02-04 PROCEDURE — 71260 CT THORAX DX C+: CPT | Mod: 26,,, | Performed by: RADIOLOGY

## 2020-02-04 PROCEDURE — 25500020 PHARM REV CODE 255: Performed by: INTERNAL MEDICINE

## 2020-02-04 PROCEDURE — 71260 CT CHEST ABDOMEN PELVIS WITH CONTRAST (XPD): ICD-10-PCS | Mod: 26,,, | Performed by: RADIOLOGY

## 2020-02-04 PROCEDURE — 74177 CT CHEST ABDOMEN PELVIS WITH CONTRAST (XPD): ICD-10-PCS | Mod: 26,,, | Performed by: RADIOLOGY

## 2020-02-04 PROCEDURE — 72125 CT NECK SPINE W/O DYE: CPT | Mod: 26,,, | Performed by: RADIOLOGY

## 2020-02-04 RX ADMIN — IOHEXOL 75 ML: 350 INJECTION, SOLUTION INTRAVENOUS at 09:02

## 2020-02-11 ENCOUNTER — OFFICE VISIT (OUTPATIENT)
Dept: HEMATOLOGY/ONCOLOGY | Facility: CLINIC | Age: 65
End: 2020-02-11
Payer: MEDICARE

## 2020-02-11 ENCOUNTER — LAB VISIT (OUTPATIENT)
Dept: LAB | Facility: HOSPITAL | Age: 65
End: 2020-02-11
Attending: INTERNAL MEDICINE
Payer: MEDICARE

## 2020-02-11 VITALS
WEIGHT: 253.06 LBS | HEART RATE: 96 BPM | HEIGHT: 67 IN | DIASTOLIC BLOOD PRESSURE: 87 MMHG | OXYGEN SATURATION: 97 % | SYSTOLIC BLOOD PRESSURE: 148 MMHG | BODY MASS INDEX: 39.72 KG/M2 | TEMPERATURE: 98 F

## 2020-02-11 DIAGNOSIS — C78.6 PERITONEAL CARCINOMATOSIS: ICD-10-CM

## 2020-02-11 DIAGNOSIS — C56.1 MALIGNANT NEOPLASM OF RIGHT OVARY: Primary | ICD-10-CM

## 2020-02-11 LAB
ALBUMIN SERPL BCP-MCNC: 3.4 G/DL (ref 3.5–5.2)
ALP SERPL-CCNC: 93 U/L (ref 55–135)
ALT SERPL W/O P-5'-P-CCNC: 36 U/L (ref 10–44)
ANION GAP SERPL CALC-SCNC: 10 MMOL/L (ref 8–16)
AST SERPL-CCNC: 33 U/L (ref 10–40)
BASOPHILS # BLD AUTO: 0.07 K/UL (ref 0–0.2)
BASOPHILS NFR BLD: 0.9 % (ref 0–1.9)
BILIRUB SERPL-MCNC: 0.5 MG/DL (ref 0.1–1)
BUN SERPL-MCNC: 18 MG/DL (ref 8–23)
CALCIUM SERPL-MCNC: 9.4 MG/DL (ref 8.7–10.5)
CHLORIDE SERPL-SCNC: 106 MMOL/L (ref 95–110)
CO2 SERPL-SCNC: 28 MMOL/L (ref 23–29)
CREAT SERPL-MCNC: 0.8 MG/DL (ref 0.5–1.4)
DIFFERENTIAL METHOD: ABNORMAL
EOSINOPHIL # BLD AUTO: 0.2 K/UL (ref 0–0.5)
EOSINOPHIL NFR BLD: 2.1 % (ref 0–8)
ERYTHROCYTE [DISTWIDTH] IN BLOOD BY AUTOMATED COUNT: 16.5 % (ref 11.5–14.5)
EST. GFR  (AFRICAN AMERICAN): >60 ML/MIN/1.73 M^2
EST. GFR  (NON AFRICAN AMERICAN): >60 ML/MIN/1.73 M^2
GLUCOSE SERPL-MCNC: 111 MG/DL (ref 70–110)
HCT VFR BLD AUTO: 39.4 % (ref 37–48.5)
HGB BLD-MCNC: 11.9 G/DL (ref 12–16)
IMM GRANULOCYTES # BLD AUTO: 0.03 K/UL (ref 0–0.04)
IMM GRANULOCYTES NFR BLD AUTO: 0.4 % (ref 0–0.5)
LYMPHOCYTES # BLD AUTO: 2.2 K/UL (ref 1–4.8)
LYMPHOCYTES NFR BLD: 28.1 % (ref 18–48)
MCH RBC QN AUTO: 27.5 PG (ref 27–31)
MCHC RBC AUTO-ENTMCNC: 30.2 G/DL (ref 32–36)
MCV RBC AUTO: 91 FL (ref 82–98)
MONOCYTES # BLD AUTO: 0.5 K/UL (ref 0.3–1)
MONOCYTES NFR BLD: 6.8 % (ref 4–15)
NEUTROPHILS # BLD AUTO: 4.9 K/UL (ref 1.8–7.7)
NEUTROPHILS NFR BLD: 61.7 % (ref 38–73)
NRBC BLD-RTO: 0 /100 WBC
PLATELET # BLD AUTO: 208 K/UL (ref 150–350)
PMV BLD AUTO: 9.1 FL (ref 9.2–12.9)
POTASSIUM SERPL-SCNC: 4.1 MMOL/L (ref 3.5–5.1)
PROT SERPL-MCNC: 6.7 G/DL (ref 6–8.4)
RBC # BLD AUTO: 4.32 M/UL (ref 4–5.4)
SODIUM SERPL-SCNC: 144 MMOL/L (ref 136–145)
WBC # BLD AUTO: 7.91 K/UL (ref 3.9–12.7)

## 2020-02-11 PROCEDURE — 99999 PR PBB SHADOW E&M-EST. PATIENT-LVL IV: CPT | Mod: PBBFAC,,, | Performed by: INTERNAL MEDICINE

## 2020-02-11 PROCEDURE — 80053 COMPREHEN METABOLIC PANEL: CPT

## 2020-02-11 PROCEDURE — 99215 OFFICE O/P EST HI 40 MIN: CPT | Mod: 25,S$PBB,, | Performed by: INTERNAL MEDICINE

## 2020-02-11 PROCEDURE — 99214 OFFICE O/P EST MOD 30 MIN: CPT | Mod: PBBFAC | Performed by: INTERNAL MEDICINE

## 2020-02-11 PROCEDURE — 99999 PR PBB SHADOW E&M-EST. PATIENT-LVL IV: ICD-10-PCS | Mod: PBBFAC,,, | Performed by: INTERNAL MEDICINE

## 2020-02-11 PROCEDURE — 36415 COLL VENOUS BLD VENIPUNCTURE: CPT

## 2020-02-11 PROCEDURE — 99215 PR OFFICE/OUTPT VISIT, EST, LEVL V, 40-54 MIN: ICD-10-PCS | Mod: 25,S$PBB,, | Performed by: INTERNAL MEDICINE

## 2020-02-11 RX ORDER — SODIUM CHLORIDE 0.9 % (FLUSH) 0.9 %
10 SYRINGE (ML) INJECTION
Status: CANCELLED | OUTPATIENT
Start: 2020-04-02

## 2020-02-11 RX ORDER — EPINEPHRINE 0.3 MG/.3ML
0.3 INJECTION SUBCUTANEOUS ONCE AS NEEDED
Status: CANCELLED | OUTPATIENT
Start: 2020-04-02

## 2020-02-11 RX ORDER — HEPARIN 100 UNIT/ML
500 SYRINGE INTRAVENOUS
Status: CANCELLED | OUTPATIENT
Start: 2020-04-02

## 2020-02-11 RX ORDER — DIPHENHYDRAMINE HYDROCHLORIDE 50 MG/ML
25 INJECTION INTRAMUSCULAR; INTRAVENOUS EVERY 10 MIN PRN
Status: CANCELLED | OUTPATIENT
Start: 2020-04-02

## 2020-02-11 RX ORDER — METHYLPREDNISOLONE SOD SUCC 125 MG
125 VIAL (EA) INJECTION ONCE AS NEEDED
Status: CANCELLED | OUTPATIENT
Start: 2020-04-02

## 2020-02-11 NOTE — PROGRESS NOTES
Subjective:       Patient ID: Casie Gaines is a 64 y.o. female.    Chief Complaint: Malignant neoplasm of right ovary [C56.1]  HPI: We have an opportunity to see Ms. Casie Gaines in Hematology Oncology clinic at [facility] on 02/10/2020.  Ms. Casie Gaines is a 64 y.o. woman with peritoneal carcinomatosis with malignant right pleural effusion.  Final pathology is consistent with ovarian primary with  2740.  PET scan demonstrates multiple omental and peritoneal avid masses consistent with peritoneal carcinomatosis, extensive retroperitoneal and mediastinal lymphadenopathy, masslike structure in the right iliac fossa likely representing ovarian mass.  Patient has been evaluated by GYN Oncology Dr. Juarez and MD Green with recommendations for him carboplatin/Taxol/Avastin.      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  S/p 6 cycle avastin taxol carboplatin.  Has great ID after 6 cycles. On 8/15/2019 underwent salpingoophorectomy. Pathology showed CR. Currently on maintenance avastin.       Peritoneal carcinomatosis    1/26/2019 Initial Diagnosis     Peritoneal carcinomatosis      2/15/2019 - 7/8/2019 Chemotherapy     Treatment Summary   Plan Name: OP GYN PACLITAXEL CARBOPLATIN (AUC 6) Q3W  Treatment Goal: Palliative  Status: Inactive  Start Date: 2/28/2019  End Date: 6/18/2019  Provider: Will Trevizo Jr., MD  Chemotherapy: bevacizumab (AVASTIN) 15 mg/kg = 1,600 mg in sodium chloride 0.9% 100 mL chemo infusion, 15 mg/kg = 1,600 mg (100 % of original dose 15 mg/kg), Intravenous, Clinic/HOD 1 time, 6 of 6 cycles  Dose modification: 15 mg/kg (original dose 15 mg/kg, Cycle 1)  Administration: 1,600 mg (2/28/2019), 1,600 mg (3/21/2019), 1,600 mg (4/11/2019), 1,600 mg (5/7/2019), 1,600 mg (5/28/2019), 1,510 mg (6/18/2019)  CARBOplatin (PARAPLATIN) 870 mg in sodium chloride 0.9% 337 mL chemo infusion, 870 mg (100 % of original dose 868.8 mg), Intravenous,  Clinic/HOD 1 time, 6 of 6 cycles  Dose modification:   (original dose 868.8 mg, Cycle 1)  Administration: 870 mg (2/28/2019), 870 mg (3/21/2019), 900 mg (4/11/2019), 870 mg (5/7/2019), 660 mg (5/28/2019), 690 mg (6/18/2019)  PACLitaxel (TAXOL) 175 mg/m2 = 396 mg in sodium chloride 0.9% 566 mL chemo infusion, 175 mg/m2 = 396 mg, Intravenous, Clinic/HOD 1 time, 6 of 6 cycles  Dose modification: 140 mg/m2 (80 % of original dose 175 mg/m2, Cycle 5)  Administration: 396 mg (2/28/2019), 396 mg (3/21/2019), 396 mg (4/11/2019), 396 mg (5/7/2019), 318 mg (5/28/2019), 306 mg (6/18/2019)      10/8/2019 -  Chemotherapy     Treatment Summary   Plan Name: OP BEVACIZUMAB Q3W   Treatment Goal: Maintenance  Status: Active  Start Date: 10/9/2019  End Date: 10/20/2020 (Planned)  Provider: Oscar Mckeon MD  Chemotherapy: bevacizumab (AVASTIN) 15 mg/kg = 1,615 mg in sodium chloride 0.9% 100 mL chemo infusion, 15 mg/kg = 1,615 mg, Intravenous, Clinic/HOD 1 time, 4 of 17 cycles  Administration: 1,615 mg (10/9/2019), 1,615 mg (10/29/2019), 1,615 mg (12/10/2019), 1,615 mg (1/21/2020)        Malignant neoplasm of right ovary    2/15/2019 Initial Diagnosis     Malignant neoplasm of right ovary      2/15/2019 - 7/8/2019 Chemotherapy     Treatment Summary   Plan Name: OP GYN PACLITAXEL CARBOPLATIN (AUC 6) Q3W  Treatment Goal: Palliative  Status: Inactive  Start Date: 2/28/2019  End Date: 6/18/2019  Provider: Will Trevizo Jr., MD  Chemotherapy: bevacizumab (AVASTIN) 15 mg/kg = 1,600 mg in sodium chloride 0.9% 100 mL chemo infusion, 15 mg/kg = 1,600 mg (100 % of original dose 15 mg/kg), Intravenous, Clinic/HOD 1 time, 6 of 6 cycles  Dose modification: 15 mg/kg (original dose 15 mg/kg, Cycle 1)  Administration: 1,600 mg (2/28/2019), 1,600 mg (3/21/2019), 1,600 mg (4/11/2019), 1,600 mg (5/7/2019), 1,600 mg (5/28/2019), 1,510 mg (6/18/2019)  CARBOplatin (PARAPLATIN) 870 mg in sodium chloride 0.9% 337 mL chemo infusion, 870 mg (100 % of original  dose 868.8 mg), Intravenous, Clinic/HOD 1 time, 6 of 6 cycles  Dose modification:   (original dose 868.8 mg, Cycle 1)  Administration: 870 mg (2/28/2019), 870 mg (3/21/2019), 900 mg (4/11/2019), 870 mg (5/7/2019), 660 mg (5/28/2019), 690 mg (6/18/2019)  PACLitaxel (TAXOL) 175 mg/m2 = 396 mg in sodium chloride 0.9% 566 mL chemo infusion, 175 mg/m2 = 396 mg, Intravenous, Clinic/HOD 1 time, 6 of 6 cycles  Dose modification: 140 mg/m2 (80 % of original dose 175 mg/m2, Cycle 5)  Administration: 396 mg (2/28/2019), 396 mg (3/21/2019), 396 mg (4/11/2019), 396 mg (5/7/2019), 318 mg (5/28/2019), 306 mg (6/18/2019)      10/8/2019 -  Chemotherapy     Treatment Summary   Plan Name: OP BEVACIZUMAB Q3W   Treatment Goal: Maintenance  Status: Active  Start Date: 10/9/2019  End Date: 10/20/2020 (Planned)  Provider: Oscar Mckeon MD  Chemotherapy: bevacizumab (AVASTIN) 15 mg/kg = 1,615 mg in sodium chloride 0.9% 100 mL chemo infusion, 15 mg/kg = 1,615 mg, Intravenous, Clinic/HOD 1 time, 4 of 17 cycles  Administration: 1,615 mg (10/9/2019), 1,615 mg (10/29/2019), 1,615 mg (12/10/2019), 1,615 mg (1/21/2020)        Malignant neoplasm of both ovaries    2/18/2019 Initial Diagnosis     Ovarian cancer      10/8/2019 -  Chemotherapy     Treatment Summary   Plan Name: OP BEVACIZUMAB Q3W   Treatment Goal: Maintenance  Status: Active  Start Date: 10/9/2019  End Date: 10/20/2020 (Planned)  Provider: Oscar Mckeon MD  Chemotherapy: bevacizumab (AVASTIN) 15 mg/kg = 1,615 mg in sodium chloride 0.9% 100 mL chemo infusion, 15 mg/kg = 1,615 mg, Intravenous, Clinic/HOD 1 time, 4 of 17 cycles  Administration: 1,615 mg (10/9/2019), 1,615 mg (10/29/2019), 1,615 mg (12/10/2019), 1,615 mg (1/21/2020)        Ovarian cancer, bilateral    8/15/2019 Initial Diagnosis     Ovarian cancer, bilateral      10/8/2019 -  Chemotherapy     Treatment Summary   Plan Name: OP BEVACIZUMAB Q3W   Treatment Goal: Maintenance  Status: Active  Start Date: 10/9/2019  End Date:  10/20/2020 (Planned)  Provider: Oscar Mckeon MD  Chemotherapy: bevacizumab (AVASTIN) 15 mg/kg = 1,615 mg in sodium chloride 0.9% 100 mL chemo infusion, 15 mg/kg = 1,615 mg, Intravenous, Clinic/HOD 1 time, 4 of 17 cycles  Administration: 1,615 mg (10/9/2019), 1,615 mg (10/29/2019), 1,615 mg (12/10/2019), 1,615 mg (2020)       Past Medical History:   Diagnosis Date    Anemia     Anxiety     Arthritis     knees    Cancer     ovarian    CHF (congestive heart failure)     Hx antineoplastic chemotherapy     last 2019    Hyperlipemia     Hypertension     Neck pain     Ovarian cancer 2019    CHEMO     Family History   Problem Relation Age of Onset    Anesthesia problems Other     Breast cancer Maternal Aunt     Breast cancer Paternal Aunt     Ovarian cancer Paternal Aunt     Colon cancer Brother     Thrombophilia Neg Hx      Social History     Socioeconomic History    Marital status:      Spouse name: Not on file    Number of children: Not on file    Years of education: Not on file    Highest education level: Not on file   Occupational History    Not on file   Social Needs    Financial resource strain: Not on file    Food insecurity:     Worry: Not on file     Inability: Not on file    Transportation needs:     Medical: Not on file     Non-medical: Not on file   Tobacco Use    Smoking status: Former Smoker     Packs/day: 1.00     Years: 25.00     Pack years: 25.00     Last attempt to quit: 10/1/2018     Years since quittin.3    Smokeless tobacco: Never Used    Tobacco comment: States started quit 2 months ago after 30 years   Substance and Sexual Activity    Alcohol use: Never     Alcohol/week: 0.0 standard drinks     Frequency: Never     Comment: occasionally  No alcohol 72h prior to sx    Drug use: No    Sexual activity: Not Currently     Partners: Male     Comment: hyst; mut monog   Lifestyle    Physical activity:     Days per week: Not on file     Minutes per session:  Not on file    Stress: Only a little   Relationships    Social connections:     Talks on phone: Not on file     Gets together: Not on file     Attends Sikhism service: Not on file     Active member of club or organization: Not on file     Attends meetings of clubs or organizations: Not on file     Relationship status: Not on file   Other Topics Concern    Not on file   Social History Narrative    Live w/ spouse and 2 dogs      Past Surgical History:   Procedure Laterality Date    breast reduction  10/02/2018     SECTION      X 1    CYSTOSCOPY W/ URETERAL STENT PLACEMENT Right 2019    Procedure: CYSTOSCOPY, WITH URETERAL STENT INSERTION;  Surgeon: Timmy Santiago IV, MD;  Location: [facility] OR;  Service: Urology;  Laterality: Right;    CYSTOSCOPY W/ URETERAL STENT REMOVAL  10/04/2019    DILATION AND CURETTAGE OF UTERUS      HYSTERECTOMY      RALH for fibroids (still has ovaries)    INSERTION OF VENOUS ACCESS PORT Left 2019    Procedure: INSERTION, VENOUS ACCESS PORT;  Surgeon: Ulisses Monzon MD;  Location: [facility] OR;  Service: General;  Laterality: Left;  Left internal jugular     LYSIS OF ADHESIONS OF URETER N/A 8/15/2019    Procedure: URETEROLYSIS;  Surgeon: Ismael Juarez MD;  Location: [facility] OR;  Service: OB/GYN;  Laterality: N/A;    OMENTECTOMY N/A 8/15/2019    Procedure: OMENTECTOMY;  Surgeon: Ismael Juarez MD;  Location: [facility] OR;  Service: OB/GYN;  Laterality: N/A;    RETROGRADE PYELOGRAPHY Right 2019    Procedure: PYELOGRAM, RETROGRADE;  Surgeon: Timmy Santiago IV, MD;  Location: [facility] OR;  Service: Urology;  Laterality: Right;    ROBOT-ASSISTED LAPAROSCOPIC SALPINGO-OOPHORECTOMY USING DA TIA XI Bilateral 8/15/2019    Procedure: XI ROBOTIC SALPINGO-OOPHORECTOMY;  Surgeon: Ismael Juarez MD;  Location: [facility] OR;  Service: OB/GYN;  Laterality: Bilateral;    TOTAL REDUCTION MAMMOPLASTY  2018    TUBAL LIGATION       Current Outpatient Medications   Medication Sig Dispense  Refill    albuterol 90 mcg/actuation inhaler Inhale 2 puffs into the lungs every 4 (four) hours as needed for Wheezing. Rescue 18 g 0    ALPRAZolam (XANAX) 0.25 MG tablet Take 1 tablet (0.25 mg total) by mouth 3 (three) times daily as needed for Anxiety. 60 tablet 0    amLODIPine (NORVASC) 5 MG tablet Take 2 tablets (10 mg total) by mouth once daily. 60 tablet 11    biotin 1 mg tablet Take 1,000 mcg by mouth once daily.       diclofenac sodium (VOLTAREN) 1 % Gel Apply once a day to back and foot as needed 100 g 1    gabapentin (NEURONTIN) 100 MG capsule Take 1 capsule (100 mg total) by mouth every evening. 30 capsule 11    HYDROcodone-acetaminophen (NORCO) 5-325 mg per tablet Take 1 tablet by mouth every 6 (six) hours as needed. 20 tablet 0    lidocaine-prilocaine (EMLA) cream Apply topically as needed. 30 g 1    multivitamin with minerals tablet Take 1 tablet by mouth once daily.       olmesartan-hydrochlorothiazide (BENICAR HCT) 40-12.5 mg Tab Take 1 tablet by mouth once daily. 90 tablet 0    ondansetron (ZOFRAN-ODT) 8 MG TbDL Take 1 tablet (8 mg total) by mouth every 8 (eight) hours as needed. 30 tablet 5    oxyCODONE-acetaminophen (PERCOCET)  mg per tablet Take 1 tablet by mouth every 8 (eight) hours as needed. 20 tablet 0    prochlorperazine (COMPAZINE) 10 MG tablet Take 1 tablet (10 mg total) by mouth every 6 (six) hours as needed. 30 tablet 5    rosuvastatin (CRESTOR) 10 MG tablet Take 1 tablet (10 mg total) by mouth once daily. 90 tablet 1    sertraline (ZOLOFT) 25 MG tablet Take 1 tablet (25 mg total) by mouth once daily. 30 tablet 11    tolterodine (DETROL) 2 MG Tab Take 1 tablet (2 mg total) by mouth 2 (two) times daily. 60 tablet 11    zinc gluconate 50 mg tablet Take 50 mg by mouth once daily.       No current facility-administered medications for this visit.        Labs:  Lab Results   Component Value Date    WBC 8.98 01/21/2020    HGB 12.2 01/21/2020    HCT 40.2 01/21/2020     MCV 92 01/21/2020     01/21/2020     BMP  Lab Results   Component Value Date     01/21/2020    K 3.6 01/21/2020     01/21/2020    CO2 24 01/21/2020    BUN 13 01/21/2020    CREATININE 0.7 01/21/2020    CALCIUM 9.2 01/21/2020    ANIONGAP 11 01/21/2020    ESTGFRAFRICA >60 01/21/2020    EGFRNONAA >60 01/21/2020     Lab Results   Component Value Date    ALT 24 01/21/2020    AST 26 01/21/2020    ALKPHOS 110 01/21/2020    BILITOT 0.6 01/21/2020       No results found for: IRON, TIBC, FERRITIN, SATURATEDIRO  No results found for: [id number]  No results found for: FOLATE  Lab Results   Component Value Date    TSH 1.869 04/19/2013       I have reviewed the radiology reports and examined the scan/xray images.    Review of Systems   Constitutional: Negative.    HENT: Negative.    Eyes: Negative.    Respiratory: Negative.    Cardiovascular: Negative.    Gastrointestinal: Negative.    Endocrine: Negative.    Genitourinary: Negative.    Musculoskeletal: Negative.    Skin: Negative.    Allergic/Immunologic: Negative.    Neurological: Negative.    Hematological: Negative.    Psychiatric/Behavioral: Negative.      ECOG SCORE    0 - Fully active-able to carry on all pre-disease performance without restriction            Objective:     Vitals:    02/11/20 0915   BP: (!) 148/87   Pulse: 96   Temp: 97.7 °F (36.5 °C)   Body mass index is 39.64 kg/m².  Physical Exam   Constitutional: She is oriented to person, place, and time. She appears well-developed and well-nourished.   HENT:   Head: Normocephalic and atraumatic.   Eyes: Conjunctivae and EOM are normal.   Neck: Normal range of motion. Neck supple.   Cardiovascular: Normal rate and regular rhythm.   Pulmonary/Chest: Effort normal and breath sounds normal.   Abdominal: Soft. Bowel sounds are normal.   Musculoskeletal: Normal range of motion.   Neurological: She is alert and oriented to person, place, and time.   Skin: Skin is warm and dry.   Psychiatric: She has a  normal mood and affect. Her behavior is normal. Judgment and thought content normal.   Nursing note and vitals reviewed.        Assessment:      1. Malignant neoplasm of right ovary    2. Peritoneal carcinomatosis           Plan:     Malignant neoplasm of right ovary  Continue on adjuvant avastin every 3 weeks.  Mrs. Gaines would like to see Nutritionist for counseling for weight loss.  -     Ambulatory referral/consult to Hematology/Oncology/Nutrition; Future; Expected date: 02/18/2020  -     CBC auto differential; Future; Expected date: 03/03/2020  -     Comprehensive metabolic panel; Future; Expected date: 03/03/2020  -     Urinalysis; Future; Expected date: 03/03/2020    Peritoneal carcinomatosis

## 2020-02-18 ENCOUNTER — TELEPHONE (OUTPATIENT)
Dept: HEMATOLOGY/ONCOLOGY | Facility: CLINIC | Age: 65
End: 2020-02-18

## 2020-02-18 NOTE — TELEPHONE ENCOUNTER
----- Message from Peggy Morelos LMSW sent at 2/18/2020  2:32 PM CST -----  Contact: Yaneth/Annita Arce did that letter  ----- Message -----  From: Azeb Childs LPN  Sent: 2/18/2020   2:18 PM CST  To: Michelle Moreno LMSW, Peggy Morelos LMSW    Which one of you all did her return to work for me? I don't recall what it said.  Tab  ----- Message -----  From: Jia Bishop  Sent: 2/18/2020  12:29 PM CST  To: Linda Moore Staff    Please call Yaneth @ [phone], Ext [phone] regarding pt return to work duties and restrictions, need to clarify end date.

## 2020-02-19 ENCOUNTER — OFFICE VISIT (OUTPATIENT)
Dept: GYNECOLOGIC ONCOLOGY | Facility: CLINIC | Age: 65
End: 2020-02-19
Payer: MEDICARE

## 2020-02-19 VITALS
HEIGHT: 67 IN | HEART RATE: 85 BPM | WEIGHT: 254.19 LBS | SYSTOLIC BLOOD PRESSURE: 124 MMHG | BODY MASS INDEX: 39.9 KG/M2 | DIASTOLIC BLOOD PRESSURE: 75 MMHG

## 2020-02-19 DIAGNOSIS — E66.01 MORBID OBESITY: ICD-10-CM

## 2020-02-19 DIAGNOSIS — I10 HYPERTENSION, UNSPECIFIED TYPE: ICD-10-CM

## 2020-02-19 DIAGNOSIS — C56.1 MALIGNANT NEOPLASM OF RIGHT OVARY: Primary | ICD-10-CM

## 2020-02-19 PROCEDURE — 99212 OFFICE O/P EST SF 10 MIN: CPT | Mod: PBBFAC | Performed by: OBSTETRICS & GYNECOLOGY

## 2020-02-19 PROCEDURE — 99999 PR PBB SHADOW E&M-EST. PATIENT-LVL II: ICD-10-PCS | Mod: PBBFAC,,, | Performed by: OBSTETRICS & GYNECOLOGY

## 2020-02-19 PROCEDURE — 99214 OFFICE O/P EST MOD 30 MIN: CPT | Mod: S$PBB,,, | Performed by: OBSTETRICS & GYNECOLOGY

## 2020-02-19 PROCEDURE — 99999 PR PBB SHADOW E&M-EST. PATIENT-LVL II: CPT | Mod: PBBFAC,,, | Performed by: OBSTETRICS & GYNECOLOGY

## 2020-02-19 PROCEDURE — 99214 PR OFFICE/OUTPT VISIT, EST, LEVL IV, 30-39 MIN: ICD-10-PCS | Mod: S$PBB,,, | Performed by: OBSTETRICS & GYNECOLOGY

## 2020-02-19 NOTE — Clinical Note
Dr. Mckeon, if she doesn't tolerate Avastin I would consider Niraparib.  PRIMA came out in the last couple of months.  It's still not FDA approved but usually they can provide the drug.  Just a thought.  Thank you.

## 2020-02-20 NOTE — PROGRESS NOTES
.  REFERRING PROVIDER  No ref. provider found     REASON FOR CONSULT  Casie Gaines  is a 64 y.o.  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  The interval history is as follows:  Patient is tolerating p.o. without any nausea vomiting.  She is having regular bowel movements.  She denies any vaginal bleeding or vaginal discharge. She denies any abdominal pain, bloating, or early satiety.    Tumor History:  1. 1/26/129: CT C/A/P: Pleural effusion, bilateral adnexal masses, lymphadenopathy, and omental caking  2. 1/27/19: CT guided omental biopsy  Right ureteral obstruction with indwelling ureteral stent  4. 2/28-6/18/19: T/C/A x6  5. 8/19/19: Robotic BSO/Right radical retroperiotneal dissection and ureterolysis/omentectomy. Complete pathologic response  6. 10/8/19-: Avastin maintenance.       REVIEW OF SYSTEMS  All systems reviewed and negative except as noted in HPI.    OBJECTIVE   Vitals:    02/19/20 1408   BP: 124/75   Pulse: 85      Body mass index is 39.81 kg/m².      1. General: Well appearing, no apparent distress, alert and oriented.  2. Lymph: Neck symmetric without cervical or supraclavicular adenopathy or mass.  3. Lungs: Normal respiratory rate, no accessory muscle use.  4. Cardiac: Normal rate  5. Psych: Normal affect.  6. Abdomen:  non-distended, soft, non-tender, are no masses, no ascites, no hepatosplenomegaly.  7. Skin: Warm, dry, no rashes or lesions.   8. Extremities: Bilateral lower extremities without edema or tenderness.  9. Genitourinary               Pelvic Examination including:                a. External genitalia are normal in appearance. No lesions noted.               b. Urethral meatus is normal size, location, and appearance.               c. Urethra is negative.               d. Bladder is nontender. No masses noted.                e. Vagina has normal mucosa with physiologic discharge. No lesions noted.              f. Uterus absent              g. Adnexa absent   h. Rectum deferred    ECOG status: 1    LABORATORY DATA  Lab data reviewed.    RADIOLOGICAL DATA  Radiology data reviewed.    ASSESSMENT / PLAN     1. Malignant neoplasm of right ovary    2. Morbid obesity    3. Hypertension, unspecified type       -CLYDE  - every 3 months.  Last  was on 11/20/19.  I would repeat another one with cycle #5 of avastin  -Consider niraparib if patient does not tolerated avastin.  PRIMA showed a benefit in PFS in all comers.    PATIENT EDUCATION  Ready to learn, no apparent learning barriers were identified; learning preferences include listening. Explained diagnosis and treatment plan; patient expressed understanding of the content.    ADMINISTRATIVE BILLING  This consultation lasted 30 minutes and greater than 50% of was spent in counseling.       Wilner Green

## 2020-02-21 DIAGNOSIS — I10 ESSENTIAL HYPERTENSION: ICD-10-CM

## 2020-02-21 RX ORDER — OLMESARTAN MEDOXOMIL AND HYDROCHLOROTHIAZIDE 40/12.5 40; 12.5 MG/1; MG/1
1 TABLET ORAL DAILY
Qty: 90 TABLET | Refills: 0 | Status: CANCELLED | OUTPATIENT
Start: 2020-02-21

## 2020-02-21 RX ORDER — HYDROCHLOROTHIAZIDE 12.5 MG/1
12.5 TABLET ORAL DAILY
Qty: 30 TABLET | Refills: 11 | Status: SHIPPED | OUTPATIENT
Start: 2020-02-21 | End: 2020-04-02

## 2020-02-21 RX ORDER — OLMESARTAN MEDOXOMIL AND HYDROCHLOROTHIAZIDE 40/12.5 40; 12.5 MG/1; MG/1
1 TABLET ORAL DAILY
Qty: 90 TABLET | Refills: 0 | Status: SHIPPED | OUTPATIENT
Start: 2020-02-21 | End: 2020-04-02 | Stop reason: SDUPTHER

## 2020-02-21 RX ORDER — OLMESARTAN MEDOXOMIL 40 MG/1
40 TABLET ORAL DAILY
Qty: 90 TABLET | Refills: 3 | Status: SHIPPED | OUTPATIENT
Start: 2020-02-21 | End: 2020-04-02

## 2020-03-02 NOTE — PROGRESS NOTES
Subjective:       Patient ID: Casie Gaines is a 64 y.o. female.    Chief Complaint: Peritoneal carcinomatosis [C78.6, C80.1]  HPI: We have an opportunity to see Ms. Casie Gaines in Hematology Oncology clinic at [facility] on 03/02/2020.  Ms. Casie Gaines is a 64 y.o. woman with peritoneal carcinomatosis with malignant right pleural effusion.  Final pathology is consistent with ovarian primary with  2740.  PET scan demonstrates multiple omental and peritoneal avid masses consistent with peritoneal carcinomatosis, extensive retroperitoneal and mediastinal lymphadenopathy, masslike structure in the right iliac fossa likely representing ovarian mass.  Patient has been evaluated by GYN Oncology Dr. Juarez and MD Green with recommendations for him carboplatin/Taxol/Avastin.      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  S/p 6 cycle avastin taxol carboplatin.  Has great WA after 6 cycles. On 8/15/2019 underwent salpingoophorectomy. Pathology showed CR. Currently on maintenance avastin.       Peritoneal carcinomatosis    1/26/2019 Initial Diagnosis     Peritoneal carcinomatosis      2/15/2019 - 7/8/2019 Chemotherapy     Treatment Summary   Plan Name: OP GYN PACLITAXEL CARBOPLATIN (AUC 6) Q3W  Treatment Goal: Palliative  Status: Inactive  Start Date: 2/28/2019  End Date: 6/18/2019  Provider: Will Trevizo Jr., MD  Chemotherapy: bevacizumab (AVASTIN) 15 mg/kg = 1,600 mg in sodium chloride 0.9% 100 mL chemo infusion, 15 mg/kg = 1,600 mg (100 % of original dose 15 mg/kg), Intravenous, Clinic/HOD 1 time, 6 of 6 cycles  Dose modification: 15 mg/kg (original dose 15 mg/kg, Cycle 1)  Administration: 1,600 mg (2/28/2019), 1,600 mg (3/21/2019), 1,600 mg (4/11/2019), 1,600 mg (5/7/2019), 1,600 mg (5/28/2019), 1,510 mg (6/18/2019)  CARBOplatin (PARAPLATIN) 870 mg in sodium chloride 0.9% 337 mL chemo infusion, 870 mg (100 % of original dose 868.8 mg), Intravenous,  Clinic/HOD 1 time, 6 of 6 cycles  Dose modification:   (original dose 868.8 mg, Cycle 1)  Administration: 870 mg (2/28/2019), 870 mg (3/21/2019), 900 mg (4/11/2019), 870 mg (5/7/2019), 660 mg (5/28/2019), 690 mg (6/18/2019)  PACLitaxel (TAXOL) 175 mg/m2 = 396 mg in sodium chloride 0.9% 566 mL chemo infusion, 175 mg/m2 = 396 mg, Intravenous, Clinic/HOD 1 time, 6 of 6 cycles  Dose modification: 140 mg/m2 (80 % of original dose 175 mg/m2, Cycle 5)  Administration: 396 mg (2/28/2019), 396 mg (3/21/2019), 396 mg (4/11/2019), 396 mg (5/7/2019), 318 mg (5/28/2019), 306 mg (6/18/2019)      10/8/2019 -  Chemotherapy     Treatment Summary   Plan Name: OP BEVACIZUMAB Q3W   Treatment Goal: Maintenance  Status: Active  Start Date: 10/9/2019  End Date: 10/20/2020 (Planned)  Provider: Oscar Mckeon MD  Chemotherapy: bevacizumab (AVASTIN) 15 mg/kg = 1,615 mg in sodium chloride 0.9% 100 mL chemo infusion, 15 mg/kg = 1,615 mg, Intravenous, Clinic/HOD 1 time, 4 of 17 cycles  Administration: 1,615 mg (10/9/2019), 1,615 mg (10/29/2019), 1,615 mg (12/10/2019), 1,615 mg (1/21/2020)        Malignant neoplasm of right ovary    2/15/2019 Initial Diagnosis     Malignant neoplasm of right ovary      2/15/2019 - 7/8/2019 Chemotherapy     Treatment Summary   Plan Name: OP GYN PACLITAXEL CARBOPLATIN (AUC 6) Q3W  Treatment Goal: Palliative  Status: Inactive  Start Date: 2/28/2019  End Date: 6/18/2019  Provider: Will Trevizo Jr., MD  Chemotherapy: bevacizumab (AVASTIN) 15 mg/kg = 1,600 mg in sodium chloride 0.9% 100 mL chemo infusion, 15 mg/kg = 1,600 mg (100 % of original dose 15 mg/kg), Intravenous, Clinic/HOD 1 time, 6 of 6 cycles  Dose modification: 15 mg/kg (original dose 15 mg/kg, Cycle 1)  Administration: 1,600 mg (2/28/2019), 1,600 mg (3/21/2019), 1,600 mg (4/11/2019), 1,600 mg (5/7/2019), 1,600 mg (5/28/2019), 1,510 mg (6/18/2019)  CARBOplatin (PARAPLATIN) 870 mg in sodium chloride 0.9% 337 mL chemo infusion, 870 mg (100 % of original  dose 868.8 mg), Intravenous, Clinic/HOD 1 time, 6 of 6 cycles  Dose modification:   (original dose 868.8 mg, Cycle 1)  Administration: 870 mg (2/28/2019), 870 mg (3/21/2019), 900 mg (4/11/2019), 870 mg (5/7/2019), 660 mg (5/28/2019), 690 mg (6/18/2019)  PACLitaxel (TAXOL) 175 mg/m2 = 396 mg in sodium chloride 0.9% 566 mL chemo infusion, 175 mg/m2 = 396 mg, Intravenous, Clinic/HOD 1 time, 6 of 6 cycles  Dose modification: 140 mg/m2 (80 % of original dose 175 mg/m2, Cycle 5)  Administration: 396 mg (2/28/2019), 396 mg (3/21/2019), 396 mg (4/11/2019), 396 mg (5/7/2019), 318 mg (5/28/2019), 306 mg (6/18/2019)      10/8/2019 -  Chemotherapy     Treatment Summary   Plan Name: OP BEVACIZUMAB Q3W   Treatment Goal: Maintenance  Status: Active  Start Date: 10/9/2019  End Date: 10/20/2020 (Planned)  Provider: Oscar Mckeon MD  Chemotherapy: bevacizumab (AVASTIN) 15 mg/kg = 1,615 mg in sodium chloride 0.9% 100 mL chemo infusion, 15 mg/kg = 1,615 mg, Intravenous, Clinic/HOD 1 time, 4 of 17 cycles  Administration: 1,615 mg (10/9/2019), 1,615 mg (10/29/2019), 1,615 mg (12/10/2019), 1,615 mg (1/21/2020)        Malignant neoplasm of both ovaries    2/18/2019 Initial Diagnosis     Ovarian cancer      10/8/2019 -  Chemotherapy     Treatment Summary   Plan Name: OP BEVACIZUMAB Q3W   Treatment Goal: Maintenance  Status: Active  Start Date: 10/9/2019  End Date: 10/20/2020 (Planned)  Provider: Oscar Mckeon MD  Chemotherapy: bevacizumab (AVASTIN) 15 mg/kg = 1,615 mg in sodium chloride 0.9% 100 mL chemo infusion, 15 mg/kg = 1,615 mg, Intravenous, Clinic/HOD 1 time, 4 of 17 cycles  Administration: 1,615 mg (10/9/2019), 1,615 mg (10/29/2019), 1,615 mg (12/10/2019), 1,615 mg (1/21/2020)        Ovarian cancer, bilateral    8/15/2019 Initial Diagnosis     Ovarian cancer, bilateral      10/8/2019 -  Chemotherapy     Treatment Summary   Plan Name: OP BEVACIZUMAB Q3W   Treatment Goal: Maintenance  Status: Active  Start Date: 10/9/2019  End Date:  10/20/2020 (Planned)  Provider: Oscar Mckeon MD  Chemotherapy: bevacizumab (AVASTIN) 15 mg/kg = 1,615 mg in sodium chloride 0.9% 100 mL chemo infusion, 15 mg/kg = 1,615 mg, Intravenous, Clinic/HOD 1 time, 4 of 17 cycles  Administration: 1,615 mg (10/9/2019), 1,615 mg (10/29/2019), 1,615 mg (12/10/2019), 1,615 mg (2020)       Past Medical History:   Diagnosis Date    Anemia     Anxiety     Arthritis     knees    Cancer     ovarian    CHF (congestive heart failure)     Hx antineoplastic chemotherapy     last 2019    Hyperlipemia     Hypertension     Neck pain     Ovarian cancer 2019    CHEMO     Family History   Problem Relation Age of Onset    Anesthesia problems Other     Breast cancer Maternal Aunt     Breast cancer Paternal Aunt     Ovarian cancer Paternal Aunt     Colon cancer Brother     Thrombophilia Neg Hx      Social History     Socioeconomic History    Marital status:      Spouse name: Not on file    Number of children: Not on file    Years of education: Not on file    Highest education level: Not on file   Occupational History    Not on file   Social Needs    Financial resource strain: Not on file    Food insecurity:     Worry: Not on file     Inability: Not on file    Transportation needs:     Medical: Not on file     Non-medical: Not on file   Tobacco Use    Smoking status: Former Smoker     Packs/day: 1.00     Years: 25.00     Pack years: 25.00     Last attempt to quit: 10/1/2018     Years since quittin.4    Smokeless tobacco: Never Used    Tobacco comment: States started quit 2 months ago after 30 years   Substance and Sexual Activity    Alcohol use: Never     Alcohol/week: 0.0 standard drinks     Frequency: Never     Comment: occasionally  No alcohol 72h prior to sx    Drug use: No    Sexual activity: Not Currently     Partners: Male     Comment: hyst; mut monog   Lifestyle    Physical activity:     Days per week: Not on file     Minutes per session:  Not on file    Stress: Only a little   Relationships    Social connections:     Talks on phone: Not on file     Gets together: Not on file     Attends Sabianist service: Not on file     Active member of club or organization: Not on file     Attends meetings of clubs or organizations: Not on file     Relationship status: Not on file   Other Topics Concern    Not on file   Social History Narrative    Live w/ spouse and 2 dogs      Past Surgical History:   Procedure Laterality Date    breast reduction  10/02/2018     SECTION      X 1    CYSTOSCOPY W/ URETERAL STENT PLACEMENT Right 2019    Procedure: CYSTOSCOPY, WITH URETERAL STENT INSERTION;  Surgeon: Timmy Santiago IV, MD;  Location: [facility] OR;  Service: Urology;  Laterality: Right;    CYSTOSCOPY W/ URETERAL STENT REMOVAL  10/04/2019    DILATION AND CURETTAGE OF UTERUS      HYSTERECTOMY      RALH for fibroids (still has ovaries)    INSERTION OF VENOUS ACCESS PORT Left 2019    Procedure: INSERTION, VENOUS ACCESS PORT;  Surgeon: Ulisses Monzon MD;  Location: [facility] OR;  Service: General;  Laterality: Left;  Left internal jugular     LYSIS OF ADHESIONS OF URETER N/A 8/15/2019    Procedure: URETEROLYSIS;  Surgeon: Ismael Juarez MD;  Location: [facility] OR;  Service: OB/GYN;  Laterality: N/A;    OMENTECTOMY N/A 8/15/2019    Procedure: OMENTECTOMY;  Surgeon: Ismael Juarez MD;  Location: [facility] OR;  Service: OB/GYN;  Laterality: N/A;    RETROGRADE PYELOGRAPHY Right 2019    Procedure: PYELOGRAM, RETROGRADE;  Surgeon: Timmy Santiago IV, MD;  Location: [facility] OR;  Service: Urology;  Laterality: Right;    ROBOT-ASSISTED LAPAROSCOPIC SALPINGO-OOPHORECTOMY USING DA TIA XI Bilateral 8/15/2019    Procedure: XI ROBOTIC SALPINGO-OOPHORECTOMY;  Surgeon: Ismael Juarez MD;  Location: [facility] OR;  Service: OB/GYN;  Laterality: Bilateral;    TOTAL REDUCTION MAMMOPLASTY  2018    TUBAL LIGATION       Current Outpatient Medications   Medication Sig Dispense  Refill    albuterol 90 mcg/actuation inhaler Inhale 2 puffs into the lungs every 4 (four) hours as needed for Wheezing. Rescue 18 g 0    ALPRAZolam (XANAX) 0.25 MG tablet Take 1 tablet (0.25 mg total) by mouth 3 (three) times daily as needed for Anxiety. 60 tablet 0    amLODIPine (NORVASC) 5 MG tablet Take 2 tablets (10 mg total) by mouth once daily. 60 tablet 11    biotin 1 mg tablet Take 1,000 mcg by mouth once daily.       diclofenac sodium (VOLTAREN) 1 % Gel Apply once a day to back and foot as needed 100 g 1    gabapentin (NEURONTIN) 100 MG capsule Take 1 capsule (100 mg total) by mouth every evening. 30 capsule 11    hydroCHLOROthiazide (HYDRODIURIL) 12.5 MG Tab Take 1 tablet (12.5 mg total) by mouth once daily. 30 tablet 11    HYDROcodone-acetaminophen (NORCO) 5-325 mg per tablet Take 1 tablet by mouth every 6 (six) hours as needed. 20 tablet 0    lidocaine-prilocaine (EMLA) cream Apply topically as needed. 30 g 1    multivitamin with minerals tablet Take 1 tablet by mouth once daily.       olmesartan (BENICAR) 40 MG tablet Take 1 tablet (40 mg total) by mouth once daily. 90 tablet 3    olmesartan-hydrochlorothiazide (BENICAR HCT) 40-12.5 mg Tab Take 1 tablet by mouth once daily. 90 tablet 0    oxyCODONE-acetaminophen (PERCOCET)  mg per tablet Take 1 tablet by mouth every 8 (eight) hours as needed. 20 tablet 0    rosuvastatin (CRESTOR) 10 MG tablet Take 1 tablet (10 mg total) by mouth once daily. 90 tablet 1    sertraline (ZOLOFT) 25 MG tablet Take 1 tablet (25 mg total) by mouth once daily. 30 tablet 11    tolterodine (DETROL) 2 MG Tab Take 1 tablet (2 mg total) by mouth 2 (two) times daily. 60 tablet 11    zinc gluconate 50 mg tablet Take 50 mg by mouth once daily.       No current facility-administered medications for this visit.        Labs:  Lab Results   Component Value Date    WBC 7.91 02/11/2020    HGB 11.9 (L) 02/11/2020    HCT 39.4 02/11/2020    MCV 91 02/11/2020      02/11/2020     BMP  Lab Results   Component Value Date     02/11/2020    K 4.1 02/11/2020     02/11/2020    CO2 28 02/11/2020    BUN 18 02/11/2020    CREATININE 0.8 02/11/2020    CALCIUM 9.4 02/11/2020    ANIONGAP 10 02/11/2020    ESTGFRAFRICA >60 02/11/2020    EGFRNONAA >60 02/11/2020     Lab Results   Component Value Date    ALT 36 02/11/2020    AST 33 02/11/2020    ALKPHOS 93 02/11/2020    BILITOT 0.5 02/11/2020       No results found for: IRON, TIBC, FERRITIN, SATURATEDIRO  No results found for: PYMPZCME71  No results found for: FOLATE  Lab Results   Component Value Date    TSH 1.869 04/19/2013       I have reviewed the radiology reports and examined the scan/xray images.    Review of Systems   Constitutional: Negative.    HENT: Negative.    Eyes: Negative.    Respiratory: Negative.    Cardiovascular: Negative.    Gastrointestinal: Negative.    Endocrine: Negative.    Genitourinary: Negative.    Musculoskeletal: Negative.    Skin: Negative.    Allergic/Immunologic: Negative.    Neurological: Negative.    Hematological: Negative.    Psychiatric/Behavioral: Negative.      ECOG SCORE    0 - Fully active-able to carry on all pre-disease performance without restriction            Objective:     Vitals:    03/03/20 1432   BP: 131/83   Pulse: 82   Temp: 98.2 °F (36.8 °C)   Body mass index is 40.19 kg/m².  Physical Exam   Constitutional: She is oriented to person, place, and time. She appears well-developed and well-nourished.   HENT:   Head: Normocephalic and atraumatic.   Eyes: Conjunctivae and EOM are normal.   Neck: Normal range of motion. Neck supple.   Cardiovascular: Normal rate and regular rhythm.   Pulmonary/Chest: Effort normal and breath sounds normal.   Abdominal: Soft. Bowel sounds are normal.   Musculoskeletal: Normal range of motion.   Neurological: She is alert and oriented to person, place, and time.   Skin: Skin is warm and dry.   Psychiatric: She has a normal mood and affect. Her behavior  is normal. Judgment and thought content normal.   Nursing note and vitals reviewed.        Assessment:      1. Peritoneal carcinomatosis    2. Malignant neoplasm of right ovary    3. Malignant neoplasm of both ovaries    4. Malignant neoplasm of ovary, unspecified laterality    5. Arthritis    6. Neuropathy    7. Abnormal weight gain           Plan:     Peritoneal carcinomatosis    Malignant neoplasm of right ovary    Malignant neoplasm of both ovaries  UA showed 3+ proteinuria, recheck in 3 weeks, resume avastin when has less proteinuria.  -     Urinalysis; Future; Expected date: 03/03/2020    Malignant neoplasm of ovary, unspecified laterality      Arthritis  -     diclofenac sodium (VOLTAREN) 1 % Gel; Apply once a day to back as needed  Dispense: 100 g; Refill: 1  -     cyclobenzaprine (FEXMID) 7.5 MG Tab; Take 1 tablet (7.5 mg total) by mouth 3 (three) times daily as needed (muscle spasm).  Dispense: 60 tablet; Refill: 0    Neuropathy  -     gabapentin (NEURONTIN) 100 MG capsule; Take 1 capsule (100 mg total) by mouth 3 (three) times daily.  Dispense: 90 capsule; Refill: 11    Abnormal weight gain  -     Ambulatory referral/consult to Hematology/Oncology/Nutrition; Future; Expected date: 03/10/2020

## 2020-03-03 ENCOUNTER — LAB VISIT (OUTPATIENT)
Dept: LAB | Facility: HOSPITAL | Age: 65
End: 2020-03-03
Attending: INTERNAL MEDICINE
Payer: MEDICARE

## 2020-03-03 ENCOUNTER — OFFICE VISIT (OUTPATIENT)
Dept: HEMATOLOGY/ONCOLOGY | Facility: CLINIC | Age: 65
End: 2020-03-03
Payer: MEDICARE

## 2020-03-03 VITALS
DIASTOLIC BLOOD PRESSURE: 75 MMHG | BODY MASS INDEX: 37.79 KG/M2 | HEIGHT: 67 IN | TEMPERATURE: 96 F | SYSTOLIC BLOOD PRESSURE: 124 MMHG | WEIGHT: 240.75 LBS | HEART RATE: 83 BPM | OXYGEN SATURATION: 99 %

## 2020-03-03 VITALS
WEIGHT: 256.63 LBS | BODY MASS INDEX: 40.19 KG/M2 | OXYGEN SATURATION: 97 % | DIASTOLIC BLOOD PRESSURE: 83 MMHG | SYSTOLIC BLOOD PRESSURE: 131 MMHG | HEART RATE: 82 BPM | TEMPERATURE: 98 F

## 2020-03-03 DIAGNOSIS — C56.9 MALIGNANT NEOPLASM OF OVARY, UNSPECIFIED LATERALITY: ICD-10-CM

## 2020-03-03 DIAGNOSIS — C56.1 MALIGNANT NEOPLASM OF RIGHT OVARY: ICD-10-CM

## 2020-03-03 DIAGNOSIS — C78.6 PERITONEAL CARCINOMATOSIS: Primary | ICD-10-CM

## 2020-03-03 DIAGNOSIS — M19.90 ARTHRITIS: ICD-10-CM

## 2020-03-03 DIAGNOSIS — R63.5 ABNORMAL WEIGHT GAIN: ICD-10-CM

## 2020-03-03 DIAGNOSIS — G62.9 NEUROPATHY: ICD-10-CM

## 2020-03-03 DIAGNOSIS — C56.3 MALIGNANT NEOPLASM OF BOTH OVARIES: ICD-10-CM

## 2020-03-03 LAB
ALBUMIN SERPL BCP-MCNC: 3.5 G/DL (ref 3.5–5.2)
ALP SERPL-CCNC: 103 U/L (ref 55–135)
ALT SERPL W/O P-5'-P-CCNC: 28 U/L (ref 10–44)
ANION GAP SERPL CALC-SCNC: 9 MMOL/L (ref 8–16)
AST SERPL-CCNC: 26 U/L (ref 10–40)
BASOPHILS # BLD AUTO: 0.07 K/UL (ref 0–0.2)
BASOPHILS NFR BLD: 0.8 % (ref 0–1.9)
BILIRUB SERPL-MCNC: 0.6 MG/DL (ref 0.1–1)
BUN SERPL-MCNC: 15 MG/DL (ref 8–23)
CALCIUM SERPL-MCNC: 9.2 MG/DL (ref 8.7–10.5)
CHLORIDE SERPL-SCNC: 108 MMOL/L (ref 95–110)
CO2 SERPL-SCNC: 27 MMOL/L (ref 23–29)
CREAT SERPL-MCNC: 0.9 MG/DL (ref 0.5–1.4)
DIFFERENTIAL METHOD: ABNORMAL
EOSINOPHIL # BLD AUTO: 0.2 K/UL (ref 0–0.5)
EOSINOPHIL NFR BLD: 2.1 % (ref 0–8)
ERYTHROCYTE [DISTWIDTH] IN BLOOD BY AUTOMATED COUNT: 16.8 % (ref 11.5–14.5)
EST. GFR  (AFRICAN AMERICAN): >60 ML/MIN/1.73 M^2
EST. GFR  (NON AFRICAN AMERICAN): >60 ML/MIN/1.73 M^2
GLUCOSE SERPL-MCNC: 95 MG/DL (ref 70–110)
HCT VFR BLD AUTO: 38.4 % (ref 37–48.5)
HGB BLD-MCNC: 12.1 G/DL (ref 12–16)
IMM GRANULOCYTES # BLD AUTO: 0.02 K/UL (ref 0–0.04)
IMM GRANULOCYTES NFR BLD AUTO: 0.2 % (ref 0–0.5)
LYMPHOCYTES # BLD AUTO: 2.6 K/UL (ref 1–4.8)
LYMPHOCYTES NFR BLD: 30.3 % (ref 18–48)
MCH RBC QN AUTO: 28.6 PG (ref 27–31)
MCHC RBC AUTO-ENTMCNC: 31.5 G/DL (ref 32–36)
MCV RBC AUTO: 91 FL (ref 82–98)
MONOCYTES # BLD AUTO: 0.6 K/UL (ref 0.3–1)
MONOCYTES NFR BLD: 7 % (ref 4–15)
NEUTROPHILS # BLD AUTO: 5.1 K/UL (ref 1.8–7.7)
NEUTROPHILS NFR BLD: 59.8 % (ref 38–73)
NRBC BLD-RTO: 0 /100 WBC
PLATELET # BLD AUTO: 215 K/UL (ref 150–350)
PMV BLD AUTO: 9.7 FL (ref 9.2–12.9)
POTASSIUM SERPL-SCNC: 3.9 MMOL/L (ref 3.5–5.1)
PROT SERPL-MCNC: 6.4 G/DL (ref 6–8.4)
RBC # BLD AUTO: 4.23 M/UL (ref 4–5.4)
SODIUM SERPL-SCNC: 144 MMOL/L (ref 136–145)
WBC # BLD AUTO: 8.6 K/UL (ref 3.9–12.7)

## 2020-03-03 PROCEDURE — 99215 OFFICE O/P EST HI 40 MIN: CPT | Mod: S$PBB,,, | Performed by: INTERNAL MEDICINE

## 2020-03-03 PROCEDURE — 80053 COMPREHEN METABOLIC PANEL: CPT

## 2020-03-03 PROCEDURE — 99213 OFFICE O/P EST LOW 20 MIN: CPT | Mod: PBBFAC | Performed by: INTERNAL MEDICINE

## 2020-03-03 PROCEDURE — 99215 PR OFFICE/OUTPT VISIT, EST, LEVL V, 40-54 MIN: ICD-10-PCS | Mod: S$PBB,,, | Performed by: INTERNAL MEDICINE

## 2020-03-03 PROCEDURE — 36415 COLL VENOUS BLD VENIPUNCTURE: CPT

## 2020-03-03 PROCEDURE — 99999 PR PBB SHADOW E&M-EST. PATIENT-LVL III: CPT | Mod: PBBFAC,,, | Performed by: INTERNAL MEDICINE

## 2020-03-03 PROCEDURE — 85025 COMPLETE CBC W/AUTO DIFF WBC: CPT

## 2020-03-03 PROCEDURE — 99999 PR PBB SHADOW E&M-EST. PATIENT-LVL III: ICD-10-PCS | Mod: PBBFAC,,, | Performed by: INTERNAL MEDICINE

## 2020-03-03 RX ORDER — CYCLOBENZAPRINE HCL 5 MG
7.5 TABLET ORAL 3 TIMES DAILY PRN
Qty: 90 TABLET | Refills: 0 | Status: SHIPPED | OUTPATIENT
Start: 2020-03-03 | End: 2020-05-02

## 2020-03-03 RX ORDER — GABAPENTIN 100 MG/1
100 CAPSULE ORAL 3 TIMES DAILY
Qty: 90 CAPSULE | Refills: 11 | Status: SHIPPED | OUTPATIENT
Start: 2020-03-03 | End: 2021-12-13 | Stop reason: SDUPTHER

## 2020-03-03 RX ORDER — DICLOFENAC SODIUM 10 MG/G
GEL TOPICAL
Qty: 100 G | Refills: 1 | Status: SHIPPED | OUTPATIENT
Start: 2020-03-03 | End: 2023-05-15 | Stop reason: SDUPTHER

## 2020-03-09 ENCOUNTER — PATIENT OUTREACH (OUTPATIENT)
Dept: ADMINISTRATIVE | Facility: OTHER | Age: 65
End: 2020-03-09

## 2020-03-10 ENCOUNTER — OFFICE VISIT (OUTPATIENT)
Dept: NEUROSURGERY | Facility: CLINIC | Age: 65
End: 2020-03-10
Payer: MEDICARE

## 2020-03-10 VITALS
HEART RATE: 83 BPM | WEIGHT: 256.63 LBS | SYSTOLIC BLOOD PRESSURE: 114 MMHG | HEIGHT: 67 IN | DIASTOLIC BLOOD PRESSURE: 71 MMHG | BODY MASS INDEX: 40.28 KG/M2 | RESPIRATION RATE: 18 BRPM

## 2020-03-10 DIAGNOSIS — M51.36 DEGENERATIVE DISC DISEASE, LUMBAR: ICD-10-CM

## 2020-03-10 DIAGNOSIS — M50.30 DEGENERATIVE DISC DISEASE, CERVICAL: Primary | ICD-10-CM

## 2020-03-10 PROCEDURE — 99213 PR OFFICE/OUTPT VISIT, EST, LEVL III, 20-29 MIN: ICD-10-PCS | Mod: S$PBB,,, | Performed by: NEUROLOGICAL SURGERY

## 2020-03-10 PROCEDURE — 99999 PR PBB SHADOW E&M-EST. PATIENT-LVL III: ICD-10-PCS | Mod: PBBFAC,,, | Performed by: NEUROLOGICAL SURGERY

## 2020-03-10 PROCEDURE — 99999 PR PBB SHADOW E&M-EST. PATIENT-LVL III: CPT | Mod: PBBFAC,,, | Performed by: NEUROLOGICAL SURGERY

## 2020-03-10 PROCEDURE — 99213 OFFICE O/P EST LOW 20 MIN: CPT | Mod: S$PBB,,, | Performed by: NEUROLOGICAL SURGERY

## 2020-03-10 PROCEDURE — 99213 OFFICE O/P EST LOW 20 MIN: CPT | Mod: PBBFAC | Performed by: NEUROLOGICAL SURGERY

## 2020-03-10 NOTE — PROGRESS NOTES
Subjective:      Patient ID: Casie Gaines is a 64 y.o. female.    Chief Complaint: Lumbar Spine Pain (L-Spine)    Here today for follow-up with a CT scan of the cervical spine for my review  Since the last visit she has had more back pain than she has been having neck problems  No symptoms in the neck today  No upper extremity symptoms  She continues to have a low chronic back pain radiating to the bilateral hips  Not going into the lower extremities  She has no weakness or numbness and tingling today in the hands  Ambulates unassisted  Denies any bowel bladder symptoms    Review of Systems   Constitutional: Negative for activity change, appetite change and chills.   HENT: Negative for congestion, ear pain, hearing loss, sore throat and tinnitus.    Eyes: Negative for photophobia, pain, discharge, redness, itching and visual disturbance.   Respiratory: Negative for apnea and chest tightness.    Cardiovascular: Negative for chest pain.   Gastrointestinal: Negative for abdominal distention and abdominal pain.   Endocrine: Negative for cold intolerance and heat intolerance.   Genitourinary: Negative for difficulty urinating and dysuria.   Musculoskeletal: Positive for back pain, gait problem, myalgias, neck pain and neck stiffness. Negative for arthralgias.   Skin: Negative for color change.   Allergic/Immunologic: Negative for environmental allergies.   Neurological: Positive for numbness. Negative for dizziness, tremors, light-headedness and headaches.   Hematological: Negative for adenopathy. Does not bruise/bleed easily.   Psychiatric/Behavioral: Negative for agitation, behavioral problems and confusion.         Objective:       Neurosurgery Physical Exam  Ortho/SPM Exam    Nursing note and vitals reviewed  Gen:Oriented to person, place, and time.             Appears stated age   Head:Normocephalic and atraumatic.  Nose: Nose normal.    Eyes: EOM are normal. Pupils are equal, round, and reactive to light.    Neck: Neck supple. No masses or lesions palpated  Cardiovascular: Intact distal pulses.    Abdominal: Soft.   Neurological: Alert and oriented to person, place, and time.  No cranial nerve deficit.  Coordination normal. Normal muscle tone  Psychiatric: Normal mood and affect. Behavior is normal.      Neck:  None Tenderness bilaterally Paraspinal tenderness   None  Paraspinal muscle spasms   None Limited secondary to pain Pain with flexion and extention   WNL Limited secondary to pain Range of motion shoulders   Neg  Spurling's sign     Motor:   Right Right Left Left  Level Group   5 4 5 4 C5 Deltoid   5 4 5 4 C6 Bicep   5  5   Wrist extension    5  5  C7 Triceps   5  5   Wrist flexion   5  5  C8    5  5  T1 Interossei      Sensation:  NL Decreased (R/L/BL) Level Sensation    X  C5 Lateral upper arm   X  C6 Thumb and index finger, lat forearm   X  C7 Middle finger   X  C8 Ring and little finger   X  T1 Medial arm      Reflex:  2+  Bicep tendon   2+  Brachioradialis   2+  Triceps tendon   Not present  Hopson's   none  Clonus   neg  Tinel's       Back:  None  Paraspinal tenderness   None  Paraspinal muscle spasms   None  Pain with flexion and extention   WNL  Range of motion    Neg  Straight leg raise     Motor:   Right Right Left Left  Level Group   5  5  L2 Hip flexor (Psoas)   5  5  L3 Leg extension (Quads)   5  5  L4 Dorsiflexion & foot inversion (Tibialis Anterior)   5  5  L5 Great toe extension ( EHL)   5  5  S1 Foot eversion (Gastroc, PL & PB)     Sensation:  NL Decreased (R/L/BL) Level Sensation    X  L2 Anterio-medial thigh   X  L3 Medial thigh around knee   X  L4 Medial foot   X  L5 Dorsum foot   X  S1 Lateral foot     Reflex:  2+  Patellar tendon (L4)   2+  Achilles tendon (S1)     MRI cervical spine  1. Advanced degenerative change of spondylosis as described above with severe spinal stenosis at C3-C4 and C4-C5.  No definite myelomalacia change although please correlate clinically for myelopathic  signs.  2. Moderate spinal stenosis at C5-C6, C6-C7.  3. Severe foraminal stenosis at multiple levels that may correlate to radiculopathy.  This is most pronounced on the right at C4-C5, on the right at C5-C6, on the left at C6-C7, and bilaterally at C7-T1    MRI lumbar spine  1. Mild degenerative grade 1 spondylolisthesis at L4-L5.  Minimal spinal stenosis at this level.  2. Right paracentral disc protrusion at L5-S1 slightly posteriorly displaces the descending right S1 spinal nerve roots without gee compression.    CT cervical spine   At the C4-5 level, there is posterior spondylotic ridging and diffuse disc bulge which results in severe narrowing of the central canal with the AP dimension of the central canal measuring approximately 5 mm in the maximal AP dimension.  The right neural foraminal canal is severely narrowed secondary to osseous encroachment from uncovertebral joint hypertrophy with moderate narrowing of the left neural foraminal canal secondary to uncovertebral joint hypertrophy.    At the C5-6 level, there is posterior spondylotic ridging and diffuse disc bulge that results in severe narrowing of the central canal with the AP dimension of the central canal at this level measuring approximately 5.5 mm.  The bilateral neural foraminal canals are severely narrowed secondary to uncovertebral joint hypertrophy.    At the C6-7 level, there is posterior spondylotic ridging slightly more prominent in the left paracentral region which results in moderate to severe narrowing of the central canal with the AP dimension of the central canal measuring approximately 6 mm.  The bilateral neural foraminal canals are severely narrowed secondary to uncovertebral joint hypertrophy left greater than the right.    At the C7-T1 level, there is mild posterior spondylotic ridging and ligamentum flavum hypertrophy resulting in at least mild narrowing of the central canal.  The bilateral neural foraminal canals appear to  be at least moderately narrowed secondary to uncovertebral joint hypertrophy.          Assessment:     1. Degenerative disc disease, cervical    2. Degenerative disc disease, lumbar      Plan:     Degenerative disc disease, cervical    Degenerative disc disease, lumbar     I have gone over her neck as well as lumbar spine images  She does have cervical disc disease with compression at multiple levels  There is narrowing of the canal posteriorly along with posterior osteophytes in the foramen  Fortunately at this time she is not having neck pain  Her main complaint seem to be a chronic lower back problems  I do not see any surgical indications for lumbar spine  If her neck bothers her in the future or she is having hand numbness tingling or any weakness I do believe she would eventually benefit from an anterior cervical diskectomy  Would not recommend this unless she is having the above symptoms  She is understanding and will make a follow-up appointment if she develops the symptoms and would like to consider surgical options    Thank you for the referral   Please call with any questions    Zeyad Bradshaw MD  Neurosurgery     Disclaimer: This note was prepared using a voice recognition system and is likely to have sound alike errors within the text.      Thank you for the referral   Please call with any questions    Zeyad Bradshaw MD  Neurosurgery

## 2020-03-16 ENCOUNTER — PATIENT MESSAGE (OUTPATIENT)
Dept: HEMATOLOGY/ONCOLOGY | Facility: CLINIC | Age: 65
End: 2020-03-16

## 2020-03-18 ENCOUNTER — PATIENT MESSAGE (OUTPATIENT)
Dept: NUTRITION | Facility: CLINIC | Age: 65
End: 2020-03-18

## 2020-03-18 ENCOUNTER — TELEPHONE (OUTPATIENT)
Dept: NUTRITION | Facility: CLINIC | Age: 65
End: 2020-03-18

## 2020-03-18 NOTE — TELEPHONE ENCOUNTER
Called patient to encourage to reschedule visit for a later date. Pt requested to keep current appointment time and not reschedule. Asked patient the travel questionnaire.

## 2020-03-19 ENCOUNTER — TELEPHONE (OUTPATIENT)
Dept: PHARMACY | Facility: CLINIC | Age: 65
End: 2020-03-19

## 2020-03-19 ENCOUNTER — PATIENT MESSAGE (OUTPATIENT)
Dept: NUTRITION | Facility: CLINIC | Age: 65
End: 2020-03-19

## 2020-03-19 ENCOUNTER — TELEPHONE (OUTPATIENT)
Dept: NUTRITION | Facility: CLINIC | Age: 65
End: 2020-03-19

## 2020-03-19 DIAGNOSIS — C56.3 MALIGNANT NEOPLASM OF BOTH OVARIES: Primary | ICD-10-CM

## 2020-03-19 NOTE — TELEPHONE ENCOUNTER
Called to schedule a virtual visit with patient. No answer. Left message w/ name and call back number.

## 2020-03-19 NOTE — TELEPHONE ENCOUNTER
Pt returning call placed to her.  She is confused that in person visit has been changed to a Telemed virtual visit.    She would like Ms. Gerri Meyers to please call her back in regards to this virtual visit.    She is unsure how this works, how to dial in.    Please call her at [phone].

## 2020-03-20 ENCOUNTER — PATIENT OUTREACH (OUTPATIENT)
Dept: ADMINISTRATIVE | Facility: OTHER | Age: 65
End: 2020-03-20

## 2020-03-26 ENCOUNTER — OFFICE VISIT (OUTPATIENT)
Dept: HEMATOLOGY/ONCOLOGY | Facility: CLINIC | Age: 65
End: 2020-03-26
Payer: MEDICARE

## 2020-03-26 ENCOUNTER — LAB VISIT (OUTPATIENT)
Dept: LAB | Facility: HOSPITAL | Age: 65
End: 2020-03-26
Attending: INTERNAL MEDICINE
Payer: MEDICARE

## 2020-03-26 VITALS
DIASTOLIC BLOOD PRESSURE: 77 MMHG | BODY MASS INDEX: 39.69 KG/M2 | OXYGEN SATURATION: 96 % | SYSTOLIC BLOOD PRESSURE: 133 MMHG | TEMPERATURE: 98 F | WEIGHT: 252.88 LBS | RESPIRATION RATE: 14 BRPM | HEART RATE: 76 BPM | HEIGHT: 67 IN

## 2020-03-26 DIAGNOSIS — I10 HYPERTENSION, UNSPECIFIED TYPE: ICD-10-CM

## 2020-03-26 DIAGNOSIS — C56.3 MALIGNANT NEOPLASM OF BOTH OVARIES: ICD-10-CM

## 2020-03-26 DIAGNOSIS — C56.3 MALIGNANT NEOPLASM OF BOTH OVARIES: Primary | ICD-10-CM

## 2020-03-26 DIAGNOSIS — E66.01 MORBID OBESITY: ICD-10-CM

## 2020-03-26 LAB
BACTERIA #/AREA URNS HPF: NORMAL /HPF
BILIRUB UR QL STRIP: NEGATIVE
CLARITY UR: CLEAR
COLOR UR: YELLOW
GLUCOSE UR QL STRIP: NEGATIVE
HGB UR QL STRIP: NEGATIVE
HYALINE CASTS #/AREA URNS LPF: 0 /LPF
KETONES UR QL STRIP: NEGATIVE
LEUKOCYTE ESTERASE UR QL STRIP: NEGATIVE
MICROSCOPIC COMMENT: NORMAL
NITRITE UR QL STRIP: NEGATIVE
PH UR STRIP: 6 [PH] (ref 5–8)
PROT UR QL STRIP: ABNORMAL
RBC #/AREA URNS HPF: 0 /HPF (ref 0–4)
SP GR UR STRIP: 1.02 (ref 1–1.03)
URN SPEC COLLECT METH UR: ABNORMAL
UROBILINOGEN UR STRIP-ACNC: NEGATIVE EU/DL
WBC #/AREA URNS HPF: 1 /HPF (ref 0–5)

## 2020-03-26 PROCEDURE — 99999 PR PBB SHADOW E&M-EST. PATIENT-LVL V: CPT | Mod: PBBFAC,,, | Performed by: INTERNAL MEDICINE

## 2020-03-26 PROCEDURE — 99215 OFFICE O/P EST HI 40 MIN: CPT | Mod: S$PBB,,, | Performed by: INTERNAL MEDICINE

## 2020-03-26 PROCEDURE — 81000 URINALYSIS NONAUTO W/SCOPE: CPT

## 2020-03-26 PROCEDURE — 99215 OFFICE O/P EST HI 40 MIN: CPT | Mod: PBBFAC | Performed by: INTERNAL MEDICINE

## 2020-03-26 PROCEDURE — 99215 PR OFFICE/OUTPT VISIT, EST, LEVL V, 40-54 MIN: ICD-10-PCS | Mod: S$PBB,,, | Performed by: INTERNAL MEDICINE

## 2020-03-26 PROCEDURE — 99999 PR PBB SHADOW E&M-EST. PATIENT-LVL V: ICD-10-PCS | Mod: PBBFAC,,, | Performed by: INTERNAL MEDICINE

## 2020-03-26 NOTE — PROGRESS NOTES
Subjective:       Patient ID: Casie Gaines is a 64 y.o. female.    Chief Complaint:  Metastatic ovarian cancer    HPI: We have an opportunity to see Ms. Casie Gaines in Hematology Oncology clinic at [facility] on 03/26/2020.  Ms. Casie Gaines is a 64 y.o. woman with peritoneal carcinomatosis with malignant right pleural effusion.  Final pathology is consistent with ovarian primary with  2740.  PET scan demonstrates multiple omental and peritoneal avid masses consistent with peritoneal carcinomatosis, extensive retroperitoneal and mediastinal lymphadenopathy, masslike structure in the right iliac fossa likely representing ovarian mass.  Patient has been evaluated by GYN Oncology Dr. Juarez and MD Green with recommendations for him carboplatin/Taxol/Avastin.      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  S/p 6 cycle avastin taxol carboplatin.  Has great ID after 6 cycles. On 8/15/2019 underwent salpingoophorectomy. Pathology showed CR. Currently on maintenance avastin.    Interval history:  64-year-old female with metastatic ovarian cancer status post carbo/Taxol/Avastin.  Pathology showed CR.  Currently on maintenance Avastin and presents today for treatment.  Her last 2 treatments were held due to proteinuria 3+.  She denies fever, chills, nausea or vomiting, chest pain, abdominal pain, diarrhea or dysuria.  She is a patient of Dr. Mckeon and I am covering for him today.       Peritoneal carcinomatosis    1/26/2019 Initial Diagnosis     Peritoneal carcinomatosis      2/15/2019 - 7/8/2019 Chemotherapy     Treatment Summary   Plan Name: OP GYN PACLITAXEL CARBOPLATIN (AUC 6) Q3W  Treatment Goal: Palliative  Status: Inactive  Start Date: 2/28/2019  End Date: 6/18/2019  Provider: Will Trevizo Jr., MD  Chemotherapy: bevacizumab (AVASTIN) 15 mg/kg = 1,600 mg in sodium chloride 0.9% 100 mL chemo infusion, 15 mg/kg = 1,600 mg (100 % of original dose 15  mg/kg), Intravenous, Clinic/HOD 1 time, 6 of 6 cycles  Dose modification: 15 mg/kg (original dose 15 mg/kg, Cycle 1)  Administration: 1,600 mg (2/28/2019), 1,600 mg (3/21/2019), 1,600 mg (4/11/2019), 1,600 mg (5/7/2019), 1,600 mg (5/28/2019), 1,510 mg (6/18/2019)  CARBOplatin (PARAPLATIN) 870 mg in sodium chloride 0.9% 337 mL chemo infusion, 870 mg (100 % of original dose 868.8 mg), Intravenous, Clinic/HOD 1 time, 6 of 6 cycles  Dose modification:   (original dose 868.8 mg, Cycle 1)  Administration: 870 mg (2/28/2019), 870 mg (3/21/2019), 900 mg (4/11/2019), 870 mg (5/7/2019), 660 mg (5/28/2019), 690 mg (6/18/2019)  PACLitaxel (TAXOL) 175 mg/m2 = 396 mg in sodium chloride 0.9% 566 mL chemo infusion, 175 mg/m2 = 396 mg, Intravenous, Clinic/HOD 1 time, 6 of 6 cycles  Dose modification: 140 mg/m2 (80 % of original dose 175 mg/m2, Cycle 5)  Administration: 396 mg (2/28/2019), 396 mg (3/21/2019), 396 mg (4/11/2019), 396 mg (5/7/2019), 318 mg (5/28/2019), 306 mg (6/18/2019)      10/8/2019 -  Chemotherapy     Treatment Summary   Plan Name: OP BEVACIZUMAB Q3W   Treatment Goal: Maintenance  Status: Active  Start Date: 10/9/2019  End Date: 12/2/2020 (Planned)  Provider: Oscar Mckeon MD  Chemotherapy: bevacizumab (AVASTIN) 15 mg/kg = 1,615 mg in sodium chloride 0.9% 100 mL chemo infusion, 15 mg/kg = 1,615 mg, Intravenous, Clinic/HOD 1 time, 4 of 17 cycles  Administration: 1,615 mg (10/9/2019), 1,615 mg (10/29/2019), 1,615 mg (12/10/2019), 1,615 mg (1/21/2020)        Malignant neoplasm of right ovary    2/15/2019 Initial Diagnosis     Malignant neoplasm of right ovary      2/15/2019 - 7/8/2019 Chemotherapy     Treatment Summary   Plan Name: OP GYN PACLITAXEL CARBOPLATIN (AUC 6) Q3W  Treatment Goal: Palliative  Status: Inactive  Start Date: 2/28/2019  End Date: 6/18/2019  Provider: Will Trevizo Jr., MD  Chemotherapy: bevacizumab (AVASTIN) 15 mg/kg = 1,600 mg in sodium chloride 0.9% 100 mL chemo infusion, 15 mg/kg = 1,600 mg  (100 % of original dose 15 mg/kg), Intravenous, Clinic/HOD 1 time, 6 of 6 cycles  Dose modification: 15 mg/kg (original dose 15 mg/kg, Cycle 1)  Administration: 1,600 mg (2/28/2019), 1,600 mg (3/21/2019), 1,600 mg (4/11/2019), 1,600 mg (5/7/2019), 1,600 mg (5/28/2019), 1,510 mg (6/18/2019)  CARBOplatin (PARAPLATIN) 870 mg in sodium chloride 0.9% 337 mL chemo infusion, 870 mg (100 % of original dose 868.8 mg), Intravenous, Clinic/HOD 1 time, 6 of 6 cycles  Dose modification:   (original dose 868.8 mg, Cycle 1)  Administration: 870 mg (2/28/2019), 870 mg (3/21/2019), 900 mg (4/11/2019), 870 mg (5/7/2019), 660 mg (5/28/2019), 690 mg (6/18/2019)  PACLitaxel (TAXOL) 175 mg/m2 = 396 mg in sodium chloride 0.9% 566 mL chemo infusion, 175 mg/m2 = 396 mg, Intravenous, Clinic/HOD 1 time, 6 of 6 cycles  Dose modification: 140 mg/m2 (80 % of original dose 175 mg/m2, Cycle 5)  Administration: 396 mg (2/28/2019), 396 mg (3/21/2019), 396 mg (4/11/2019), 396 mg (5/7/2019), 318 mg (5/28/2019), 306 mg (6/18/2019)      10/8/2019 -  Chemotherapy     Treatment Summary   Plan Name: OP BEVACIZUMAB Q3W   Treatment Goal: Maintenance  Status: Active  Start Date: 10/9/2019  End Date: 12/2/2020 (Planned)  Provider: Oscar Mckeon MD  Chemotherapy: bevacizumab (AVASTIN) 15 mg/kg = 1,615 mg in sodium chloride 0.9% 100 mL chemo infusion, 15 mg/kg = 1,615 mg, Intravenous, Clinic/HOD 1 time, 4 of 17 cycles  Administration: 1,615 mg (10/9/2019), 1,615 mg (10/29/2019), 1,615 mg (12/10/2019), 1,615 mg (1/21/2020)        Malignant neoplasm of both ovaries    2/18/2019 Initial Diagnosis     Ovarian cancer      10/8/2019 -  Chemotherapy     Treatment Summary   Plan Name: OP BEVACIZUMAB Q3W   Treatment Goal: Maintenance  Status: Active  Start Date: 10/9/2019  End Date: 12/2/2020 (Planned)  Provider: Oscar Mckeon MD  Chemotherapy: bevacizumab (AVASTIN) 15 mg/kg = 1,615 mg in sodium chloride 0.9% 100 mL chemo infusion, 15 mg/kg = 1,615 mg, Intravenous,  Clinic/HOD 1 time, 4 of 17 cycles  Administration: 1,615 mg (10/9/2019), 1,615 mg (10/29/2019), 1,615 mg (12/10/2019), 1,615 mg (2020)        Ovarian cancer, bilateral    8/15/2019 Initial Diagnosis     Ovarian cancer, bilateral      10/8/2019 -  Chemotherapy     Treatment Summary   Plan Name: OP BEVACIZUMAB Q3W   Treatment Goal: Maintenance  Status: Active  Start Date: 10/9/2019  End Date: 2020 (Planned)  Provider: Oscar Mckeon MD  Chemotherapy: bevacizumab (AVASTIN) 15 mg/kg = 1,615 mg in sodium chloride 0.9% 100 mL chemo infusion, 15 mg/kg = 1,615 mg, Intravenous, Clinic/HOD 1 time, 4 of 17 cycles  Administration: 1,615 mg (10/9/2019), 1,615 mg (10/29/2019), 1,615 mg (12/10/2019), 1,615 mg (2020)       Past Medical History:   Diagnosis Date    Anemia     Anxiety     Arthritis     knees    Cancer     ovarian    CHF (congestive heart failure)     Hx antineoplastic chemotherapy     last 2019    Hyperlipemia     Hypertension     Neck pain     Ovarian cancer 2019    CHEMO     Family History   Problem Relation Age of Onset    Anesthesia problems Other     Breast cancer Maternal Aunt     Breast cancer Paternal Aunt     Ovarian cancer Paternal Aunt     Colon cancer Brother     Thrombophilia Neg Hx      Social History     Socioeconomic History    Marital status:      Spouse name: Not on file    Number of children: Not on file    Years of education: Not on file    Highest education level: Not on file   Occupational History    Not on file   Social Needs    Financial resource strain: Not on file    Food insecurity:     Worry: Not on file     Inability: Not on file    Transportation needs:     Medical: Not on file     Non-medical: Not on file   Tobacco Use    Smoking status: Former Smoker     Packs/day: 1.00     Years: 25.00     Pack years: 25.00     Last attempt to quit: 10/1/2018     Years since quittin.4    Smokeless tobacco: Never Used    Tobacco comment: States  started quit 2 months ago after 30 years   Substance and Sexual Activity    Alcohol use: Never     Alcohol/week: 0.0 standard drinks     Frequency: Never     Comment: occasionally  No alcohol 72h prior to sx    Drug use: No    Sexual activity: Not Currently     Partners: Male     Comment: hyst; mut monog   Lifestyle    Physical activity:     Days per week: Not on file     Minutes per session: Not on file    Stress: Only a little   Relationships    Social connections:     Talks on phone: Not on file     Gets together: Not on file     Attends Mosque service: Not on file     Active member of club or organization: Not on file     Attends meetings of clubs or organizations: Not on file     Relationship status: Not on file   Other Topics Concern    Not on file   Social History Narrative    Live w/ spouse and 2 dogs      Past Surgical History:   Procedure Laterality Date    breast reduction  10/02/2018     SECTION      X 1    CYSTOSCOPY W/ URETERAL STENT PLACEMENT Right 2019    Procedure: CYSTOSCOPY, WITH URETERAL STENT INSERTION;  Surgeon: Timmy Santiago IV, MD;  Location: [facility];  Service: Urology;  Laterality: Right;    CYSTOSCOPY W/ URETERAL STENT REMOVAL  10/04/2019    DILATION AND CURETTAGE OF UTERUS      HYSTERECTOMY      RALH for fibroids (still has ovaries)    INSERTION OF VENOUS ACCESS PORT Left 2019    Procedure: INSERTION, VENOUS ACCESS PORT;  Surgeon: Ulisses Monzon MD;  Location: [facility] OR;  Service: General;  Laterality: Left;  Left internal jugular     LYSIS OF ADHESIONS OF URETER N/A 8/15/2019    Procedure: URETEROLYSIS;  Surgeon: Ismael Juarez MD;  Location: [facility] OR;  Service: OB/GYN;  Laterality: N/A;    OMENTECTOMY N/A 8/15/2019    Procedure: OMENTECTOMY;  Surgeon: Ismael Juarez MD;  Location: [facility] OR;  Service: OB/GYN;  Laterality: N/A;    RETROGRADE PYELOGRAPHY Right 2019    Procedure: PYELOGRAM, RETROGRADE;  Surgeon: Timmy Santiago IV, MD;  Location:  [facility] OR;  Service: Urology;  Laterality: Right;    ROBOT-ASSISTED LAPAROSCOPIC SALPINGO-OOPHORECTOMY USING DA TIA XI Bilateral 8/15/2019    Procedure: XI ROBOTIC SALPINGO-OOPHORECTOMY;  Surgeon: Ismael Juarez MD;  Location: [facility] OR;  Service: OB/GYN;  Laterality: Bilateral;    TOTAL REDUCTION MAMMOPLASTY  2018    TUBAL LIGATION       Current Outpatient Medications   Medication Sig Dispense Refill    ALPRAZolam (XANAX) 0.25 MG tablet Take 1 tablet (0.25 mg total) by mouth 3 (three) times daily as needed for Anxiety. 60 tablet 0    amLODIPine (NORVASC) 5 MG tablet Take 2 tablets (10 mg total) by mouth once daily. 60 tablet 11    biotin 1 mg tablet Take 1,000 mcg by mouth once daily.       cyclobenzaprine (FLEXERIL) 5 MG tablet Take 1.5 tablets (7.5 mg total) by mouth 3 (three) times daily as needed (muscle spasm). 90 tablet 0    diclofenac sodium (VOLTAREN) 1 % Gel Apply once a day to back as needed 100 g 1    gabapentin (NEURONTIN) 100 MG capsule Take 1 capsule (100 mg total) by mouth 3 (three) times daily. 90 capsule 11    hydroCHLOROthiazide (HYDRODIURIL) 12.5 MG Tab Take 1 tablet (12.5 mg total) by mouth once daily. 30 tablet 11    HYDROcodone-acetaminophen (NORCO) 5-325 mg per tablet Take 1 tablet by mouth every 6 (six) hours as needed. 20 tablet 0    lidocaine-prilocaine (EMLA) cream Apply topically as needed. 30 g 1    multivitamin with minerals tablet Take 1 tablet by mouth once daily.       olmesartan (BENICAR) 40 MG tablet Take 1 tablet (40 mg total) by mouth once daily. 90 tablet 3    olmesartan-hydrochlorothiazide (BENICAR HCT) 40-12.5 mg Tab Take 1 tablet by mouth once daily. 90 tablet 0    oxyCODONE-acetaminophen (PERCOCET)  mg per tablet Take 1 tablet by mouth every 8 (eight) hours as needed. 20 tablet 0    rosuvastatin (CRESTOR) 10 MG tablet Take 1 tablet (10 mg total) by mouth once daily. 90 tablet 1    sertraline (ZOLOFT) 25 MG tablet Take 1 tablet (25 mg total) by mouth  once daily. 30 tablet 11    zinc gluconate 50 mg tablet Take 50 mg by mouth once daily.      albuterol 90 mcg/actuation inhaler Inhale 2 puffs into the lungs every 4 (four) hours as needed for Wheezing. Rescue 18 g 0    tolterodine (DETROL) 2 MG Tab Take 1 tablet (2 mg total) by mouth 2 (two) times daily. 60 tablet 11     No current facility-administered medications for this visit.        Labs:  Lab Results   Component Value Date    WBC 8.60 03/03/2020    HGB 12.1 03/03/2020    HCT 38.4 03/03/2020    MCV 91 03/03/2020     03/03/2020     BMP  Lab Results   Component Value Date     03/03/2020    K 3.9 03/03/2020     03/03/2020    CO2 27 03/03/2020    BUN 15 03/03/2020    CREATININE 0.9 03/03/2020    CALCIUM 9.2 03/03/2020    ANIONGAP 9 03/03/2020    ESTGFRAFRICA >60 03/03/2020    EGFRNONAA >60 03/03/2020     Lab Results   Component Value Date    ALT 28 03/03/2020    AST 26 03/03/2020    ALKPHOS 103 03/03/2020    BILITOT 0.6 03/03/2020       No results found for: IRON, TIBC, FERRITIN, SATURATEDIRO  No results found for: [id number]  No results found for: FOLATE  Lab Results   Component Value Date    TSH 1.869 04/19/2013       I have reviewed the radiology reports and examined the scan/xray images.    Review of Systems   Constitutional: Negative.    HENT: Negative.    Eyes: Negative.    Respiratory: Negative.    Cardiovascular: Negative.    Gastrointestinal: Negative.    Endocrine: Negative.    Genitourinary: Negative.    Musculoskeletal: Negative.    Skin: Negative.    Allergic/Immunologic: Negative.    Neurological: Negative.    Hematological: Negative.    Psychiatric/Behavioral: Negative.      ECOG SCORE    1 - Restricted in strenuous activity-ambulatory and able to carry out work of a light nature            Objective:     Vitals:    03/26/20 1132   BP: 133/77   Pulse: 76   Resp: 14   Temp: 97.7 °F (36.5 °C)   Body mass index is 39.6 kg/m².  Physical Exam   Constitutional: She is oriented to  person, place, and time. She appears well-developed and well-nourished.   HENT:   Head: Normocephalic and atraumatic.   Eyes: Conjunctivae and EOM are normal.   Neck: Normal range of motion. Neck supple.   Cardiovascular: Normal rate and regular rhythm.   Pulmonary/Chest: Effort normal and breath sounds normal.   Abdominal: Soft. Bowel sounds are normal.   Musculoskeletal: Normal range of motion.   Neurological: She is alert and oriented to person, place, and time.   Skin: Skin is warm and dry.   Psychiatric: She has a normal mood and affect. Her behavior is normal. Judgment and thought content normal.   Nursing note and vitals reviewed.        Assessment:      1. Malignant neoplasm of both ovaries    2. Morbid obesity    3. Hypertension, unspecified type           Plan:     Malignant neoplasm of both ovaries  Reviewed patient's labs today and noted persistent proteinuria 2+.  It appears that since October of 2019 patient's proteinuria has been between 2+ and 3+ and has resulted in multiple skips in treatment.  Given her proteinuria on today's lab, I recommend holding off on Avastin at this time and further working her up for these persistent proteinuria.  There is no evidence of nephrotic syndrome.  My plan is to obtain a 24 hr urine collection and determine the appropriate protein concentration.  I advised patient that for protein levels more than 2 g in 24 hr we usually hold Avastin until less than 2 g in 24 hr.  She verbalized understanding.  Will plan to set up in 1 week to follow-up with her primary oncologist-Dr. Mckeon.  She will bring in the urine 2 or 3 days prior to her next office visit to allow time to evaluate before next visit.  She verbalized understanding.    Morbid obesity  Counseled on lifestyle modification including diet and exercise.    Hypertension  Management per PCP.  On [facility].      Yaya Tenorio MD

## 2020-03-26 NOTE — ASSESSMENT & PLAN NOTE
Reviewed patient's labs today and noted persistent proteinuria 2+.  It appears that since October of 2019 patient's proteinuria has been between 2+ and 3+ and has resulted in multiple skips in treatment.  Given her proteinuria on today's lab, I recommend holding off on Avastin at this time and further working her up for these persistent proteinuria.  There is no evidence of nephrotic syndrome.  My plan is to obtain a 24 hr urine collection and determine the appropriate protein concentration.  I advised patient that for protein levels more than 2 g in 24 hr we usually hold Avastin until less than 2 g in 24 hr.  She verbalized understanding.  Will plan to set up in 1 week to follow-up with her primary oncologist-Dr. Mckeon.  She will bring in the urine 2 or 3 days prior to her next office visit to allow time to evaluate before next visit.  She verbalized understanding.

## 2020-04-01 NOTE — PROGRESS NOTES
Subjective:       Patient ID: Casie Gaines is a 64 y.o. female.    Chief Complaint: Malignant neoplasm of right ovary [C56.1]  HPI: We have an opportunity to see Ms. Casie Gaines in Hematology Oncology clinic at [facility] on 04/01/2020.  Ms. Casie Gaines is a 64 y.o. woman with peritoneal carcinomatosis with malignant right pleural effusion.  Final pathology is consistent with ovarian primary with  2740.  PET scan demonstrates multiple omental and peritoneal avid masses consistent with peritoneal carcinomatosis, extensive retroperitoneal and mediastinal lymphadenopathy, masslike structure in the right iliac fossa likely representing ovarian mass.  Patient has been evaluated by GYN Oncology Dr. Juarez and MD Green with recommendations for him carboplatin/Taxol/Avastin.      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  S/p 6 cycle avastin taxol carboplatin.  Has great LA after 6 cycles. On 8/15/2019 underwent salpingoophorectomy. Pathology showed CR. Currently on maintenance avastin.       Peritoneal carcinomatosis    1/26/2019 Initial Diagnosis     Peritoneal carcinomatosis      2/15/2019 - 7/8/2019 Chemotherapy     Treatment Summary   Plan Name: OP GYN PACLITAXEL CARBOPLATIN (AUC 6) Q3W  Treatment Goal: Palliative  Status: Inactive  Start Date: 2/28/2019  End Date: 6/18/2019  Provider: Will Trevizo Jr., MD  Chemotherapy: bevacizumab (AVASTIN) 15 mg/kg = 1,600 mg in sodium chloride 0.9% 100 mL chemo infusion, 15 mg/kg = 1,600 mg (100 % of original dose 15 mg/kg), Intravenous, Clinic/HOD 1 time, 6 of 6 cycles  Dose modification: 15 mg/kg (original dose 15 mg/kg, Cycle 1)  Administration: 1,600 mg (2/28/2019), 1,600 mg (3/21/2019), 1,600 mg (4/11/2019), 1,600 mg (5/7/2019), 1,600 mg (5/28/2019), 1,510 mg (6/18/2019)  CARBOplatin (PARAPLATIN) 870 mg in sodium chloride 0.9% 337 mL chemo infusion, 870 mg (100 % of original dose 868.8 mg), Intravenous,  Clinic/HOD 1 time, 6 of 6 cycles  Dose modification:   (original dose 868.8 mg, Cycle 1)  Administration: 870 mg (2/28/2019), 870 mg (3/21/2019), 900 mg (4/11/2019), 870 mg (5/7/2019), 660 mg (5/28/2019), 690 mg (6/18/2019)  PACLitaxel (TAXOL) 175 mg/m2 = 396 mg in sodium chloride 0.9% 566 mL chemo infusion, 175 mg/m2 = 396 mg, Intravenous, Clinic/HOD 1 time, 6 of 6 cycles  Dose modification: 140 mg/m2 (80 % of original dose 175 mg/m2, Cycle 5)  Administration: 396 mg (2/28/2019), 396 mg (3/21/2019), 396 mg (4/11/2019), 396 mg (5/7/2019), 318 mg (5/28/2019), 306 mg (6/18/2019)      10/8/2019 -  Chemotherapy     Treatment Summary   Plan Name: OP BEVACIZUMAB Q3W   Treatment Goal: Maintenance  Status: Active  Start Date: 10/9/2019  End Date: 12/2/2020 (Planned)  Provider: Oscar Mckeon MD  Chemotherapy: bevacizumab (AVASTIN) 15 mg/kg = 1,615 mg in sodium chloride 0.9% 100 mL chemo infusion, 15 mg/kg = 1,615 mg, Intravenous, Clinic/HOD 1 time, 4 of 17 cycles  Administration: 1,615 mg (10/9/2019), 1,615 mg (10/29/2019), 1,615 mg (12/10/2019), 1,615 mg (1/21/2020)        Malignant neoplasm of right ovary    2/15/2019 Initial Diagnosis     Malignant neoplasm of right ovary      2/15/2019 - 7/8/2019 Chemotherapy     Treatment Summary   Plan Name: OP GYN PACLITAXEL CARBOPLATIN (AUC 6) Q3W  Treatment Goal: Palliative  Status: Inactive  Start Date: 2/28/2019  End Date: 6/18/2019  Provider: Will Trevizo Jr., MD  Chemotherapy: bevacizumab (AVASTIN) 15 mg/kg = 1,600 mg in sodium chloride 0.9% 100 mL chemo infusion, 15 mg/kg = 1,600 mg (100 % of original dose 15 mg/kg), Intravenous, Clinic/HOD 1 time, 6 of 6 cycles  Dose modification: 15 mg/kg (original dose 15 mg/kg, Cycle 1)  Administration: 1,600 mg (2/28/2019), 1,600 mg (3/21/2019), 1,600 mg (4/11/2019), 1,600 mg (5/7/2019), 1,600 mg (5/28/2019), 1,510 mg (6/18/2019)  CARBOplatin (PARAPLATIN) 870 mg in sodium chloride 0.9% 337 mL chemo infusion, 870 mg (100 % of original  dose 868.8 mg), Intravenous, Clinic/HOD 1 time, 6 of 6 cycles  Dose modification:   (original dose 868.8 mg, Cycle 1)  Administration: 870 mg (2/28/2019), 870 mg (3/21/2019), 900 mg (4/11/2019), 870 mg (5/7/2019), 660 mg (5/28/2019), 690 mg (6/18/2019)  PACLitaxel (TAXOL) 175 mg/m2 = 396 mg in sodium chloride 0.9% 566 mL chemo infusion, 175 mg/m2 = 396 mg, Intravenous, Clinic/HOD 1 time, 6 of 6 cycles  Dose modification: 140 mg/m2 (80 % of original dose 175 mg/m2, Cycle 5)  Administration: 396 mg (2/28/2019), 396 mg (3/21/2019), 396 mg (4/11/2019), 396 mg (5/7/2019), 318 mg (5/28/2019), 306 mg (6/18/2019)      10/8/2019 -  Chemotherapy     Treatment Summary   Plan Name: OP BEVACIZUMAB Q3W   Treatment Goal: Maintenance  Status: Active  Start Date: 10/9/2019  End Date: 12/2/2020 (Planned)  Provider: Oscar Mckeon MD  Chemotherapy: bevacizumab (AVASTIN) 15 mg/kg = 1,615 mg in sodium chloride 0.9% 100 mL chemo infusion, 15 mg/kg = 1,615 mg, Intravenous, Clinic/HOD 1 time, 4 of 17 cycles  Administration: 1,615 mg (10/9/2019), 1,615 mg (10/29/2019), 1,615 mg (12/10/2019), 1,615 mg (1/21/2020)        Malignant neoplasm of both ovaries    2/18/2019 Initial Diagnosis     Ovarian cancer      10/8/2019 -  Chemotherapy     Treatment Summary   Plan Name: OP BEVACIZUMAB Q3W   Treatment Goal: Maintenance  Status: Active  Start Date: 10/9/2019  End Date: 12/2/2020 (Planned)  Provider: Oscar Mckeon MD  Chemotherapy: bevacizumab (AVASTIN) 15 mg/kg = 1,615 mg in sodium chloride 0.9% 100 mL chemo infusion, 15 mg/kg = 1,615 mg, Intravenous, Clinic/HOD 1 time, 4 of 17 cycles  Administration: 1,615 mg (10/9/2019), 1,615 mg (10/29/2019), 1,615 mg (12/10/2019), 1,615 mg (1/21/2020)        Ovarian cancer, bilateral    8/15/2019 Initial Diagnosis     Ovarian cancer, bilateral      10/8/2019 -  Chemotherapy     Treatment Summary   Plan Name: OP BEVACIZUMAB Q3W   Treatment Goal: Maintenance  Status: Active  Start Date: 10/9/2019  End Date:  2020 (Planned)  Provider: Oscar Mckeon MD  Chemotherapy: bevacizumab (AVASTIN) 15 mg/kg = 1,615 mg in sodium chloride 0.9% 100 mL chemo infusion, 15 mg/kg = 1,615 mg, Intravenous, Clinic/HOD 1 time, 4 of 17 cycles  Administration: 1,615 mg (10/9/2019), 1,615 mg (10/29/2019), 1,615 mg (12/10/2019), 1,615 mg (2020)       Past Medical History:   Diagnosis Date    Anemia     Anxiety     Arthritis     knees    Cancer     ovarian    CHF (congestive heart failure)     Hx antineoplastic chemotherapy     last 2019    Hyperlipemia     Hypertension     Neck pain     Ovarian cancer 2019    CHEMO     Family History   Problem Relation Age of Onset    Anesthesia problems Other     Breast cancer Maternal Aunt     Breast cancer Paternal Aunt     Ovarian cancer Paternal Aunt     Colon cancer Brother     Thrombophilia Neg Hx      Social History     Socioeconomic History    Marital status:      Spouse name: Not on file    Number of children: Not on file    Years of education: Not on file    Highest education level: Not on file   Occupational History    Not on file   Social Needs    Financial resource strain: Not on file    Food insecurity:     Worry: Not on file     Inability: Not on file    Transportation needs:     Medical: Not on file     Non-medical: Not on file   Tobacco Use    Smoking status: Former Smoker     Packs/day: 1.00     Years: 25.00     Pack years: 25.00     Last attempt to quit: 10/1/2018     Years since quittin.5    Smokeless tobacco: Never Used    Tobacco comment: States started quit 2 months ago after 30 years   Substance and Sexual Activity    Alcohol use: Never     Alcohol/week: 0.0 standard drinks     Frequency: Never     Comment: occasionally  No alcohol 72h prior to sx    Drug use: No    Sexual activity: Not Currently     Partners: Male     Comment: hyst; mut monog   Lifestyle    Physical activity:     Days per week: Not on file     Minutes per session: Not  on file    Stress: Only a little   Relationships    Social connections:     Talks on phone: Not on file     Gets together: Not on file     Attends [employer] witness service: Not on file     Active member of club or organization: Not on file     Attends meetings of clubs or organizations: Not on file     Relationship status: Not on file   Other Topics Concern    Not on file   Social History Narrative    Live w/ spouse and 2 dogs      Past Surgical History:   Procedure Laterality Date    breast reduction  10/02/2018     SECTION      X 1    CYSTOSCOPY W/ URETERAL STENT PLACEMENT Right 2019    Procedure: CYSTOSCOPY, WITH URETERAL STENT INSERTION;  Surgeon: Timmy Santiago IV, MD;  Location: [facility];  Service: Urology;  Laterality: Right;    CYSTOSCOPY W/ URETERAL STENT REMOVAL  10/04/2019    DILATION AND CURETTAGE OF UTERUS      HYSTERECTOMY      RALH for fibroids (still has ovaries)    INSERTION OF VENOUS ACCESS PORT Left 2019    Procedure: INSERTION, VENOUS ACCESS PORT;  Surgeon: Ulisses Monzon MD;  Location: [facility] OR;  Service: General;  Laterality: Left;  Left internal jugular     LYSIS OF ADHESIONS OF URETER N/A 8/15/2019    Procedure: URETEROLYSIS;  Surgeon: Ismael Juarez MD;  Location: [facility] OR;  Service: OB/GYN;  Laterality: N/A;    OMENTECTOMY N/A 8/15/2019    Procedure: OMENTECTOMY;  Surgeon: Ismael Juarez MD;  Location: [facility] OR;  Service: OB/GYN;  Laterality: N/A;    RETROGRADE PYELOGRAPHY Right 2019    Procedure: PYELOGRAM, RETROGRADE;  Surgeon: Timmy Santiago IV, MD;  Location: [facility] OR;  Service: Urology;  Laterality: Right;    ROBOT-ASSISTED LAPAROSCOPIC SALPINGO-OOPHORECTOMY USING DA TIA XI Bilateral 8/15/2019    Procedure: XI ROBOTIC SALPINGO-OOPHORECTOMY;  Surgeon: Ismael Juarez MD;  Location: [facility] OR;  Service: OB/GYN;  Laterality: Bilateral;    TOTAL REDUCTION MAMMOPLASTY  2018    TUBAL LIGATION       Current Outpatient Medications   Medication Sig Dispense Refill     albuterol 90 mcg/actuation inhaler Inhale 2 puffs into the lungs every 4 (four) hours as needed for Wheezing. Rescue 18 g 0    ALPRAZolam (XANAX) 0.25 MG tablet Take 1 tablet (0.25 mg total) by mouth 3 (three) times daily as needed for Anxiety. 60 tablet 0    amLODIPine (NORVASC) 5 MG tablet Take 2 tablets (10 mg total) by mouth once daily. 60 tablet 11    biotin 1 mg tablet Take 1,000 mcg by mouth once daily.       cyclobenzaprine (FLEXERIL) 5 MG tablet Take 1.5 tablets (7.5 mg total) by mouth 3 (three) times daily as needed (muscle spasm). 90 tablet 0    diclofenac sodium (VOLTAREN) 1 % Gel Apply once a day to back as needed 100 g 1    gabapentin (NEURONTIN) 100 MG capsule Take 1 capsule (100 mg total) by mouth 3 (three) times daily. 90 capsule 11    hydroCHLOROthiazide (HYDRODIURIL) 12.5 MG Tab Take 1 tablet (12.5 mg total) by mouth once daily. 30 tablet 11    HYDROcodone-acetaminophen (NORCO) 5-325 mg per tablet Take 1 tablet by mouth every 6 (six) hours as needed. 20 tablet 0    lidocaine-prilocaine (EMLA) cream Apply topically as needed. 30 g 1    multivitamin with minerals tablet Take 1 tablet by mouth once daily.       olmesartan (BENICAR) 40 MG tablet Take 1 tablet (40 mg total) by mouth once daily. 90 tablet 3    olmesartan-hydrochlorothiazide (BENICAR HCT) 40-12.5 mg Tab Take 1 tablet by mouth once daily. 90 tablet 0    oxyCODONE-acetaminophen (PERCOCET)  mg per tablet Take 1 tablet by mouth every 8 (eight) hours as needed. 20 tablet 0    rosuvastatin (CRESTOR) 10 MG tablet Take 1 tablet (10 mg total) by mouth once daily. 90 tablet 1    sertraline (ZOLOFT) 25 MG tablet Take 1 tablet (25 mg total) by mouth once daily. 30 tablet 11    tolterodine (DETROL) 2 MG Tab Take 1 tablet (2 mg total) by mouth 2 (two) times daily. 60 tablet 11    zinc gluconate 50 mg tablet Take 50 mg by mouth once daily.       No current facility-administered medications for this visit.        Labs:  Lab  Results   Component Value Date    WBC 8.60 03/03/2020    HGB 12.1 03/03/2020    HCT 38.4 03/03/2020    MCV 91 03/03/2020     03/03/2020     BMP  Lab Results   Component Value Date     03/03/2020    K 3.9 03/03/2020     03/03/2020    CO2 27 03/03/2020    BUN 15 03/03/2020    CREATININE 0.9 03/03/2020    CALCIUM 9.2 03/03/2020    ANIONGAP 9 03/03/2020    ESTGFRAFRICA >60 03/03/2020    EGFRNONAA >60 03/03/2020     Lab Results   Component Value Date    ALT 28 03/03/2020    AST 26 03/03/2020    ALKPHOS 103 03/03/2020    BILITOT 0.6 03/03/2020       No results found for: IRON, TIBC, FERRITIN, SATURATEDIRO  No results found for: ICLPFXUQ18  No results found for: FOLATE  Lab Results   Component Value Date    TSH 1.869 04/19/2013       I have reviewed the radiology reports and examined the scan/xray images.    Review of Systems   Constitutional: Negative.    HENT: Negative.    Eyes: Negative.    Respiratory: Negative.    Cardiovascular: Negative.    Gastrointestinal: Negative.    Endocrine: Negative.    Genitourinary: Negative.    Musculoskeletal: Negative.    Skin: Negative.    Allergic/Immunologic: Negative.    Neurological: Negative.    Hematological: Negative.    Psychiatric/Behavioral: Negative.      ECOG SCORE    0 - Fully active-able to carry on all pre-disease performance without restriction            Objective:   There were no vitals filed for this visit.There is no height or weight on file to calculate BMI.  Physical Exam      Assessment:      1. Malignant neoplasm of right ovary    2. Peritoneal carcinomatosis    3. Malignant neoplasm of both ovaries           Plan:     Malignant neoplasm of right ovary  Continue on adjuvant avastin.   -     CBC auto differential; Future; Expected date: 04/23/2020  -     Comprehensive metabolic panel; Future; Expected date: 04/23/2020  -     Urinalysis; Future; Expected date: 04/23/2020    Peritoneal carcinomatosis    Malignant neoplasm of both  ovaries    Essential hypertension  -     olmesartan-hydrochlorothiazide (BENICAR HCT) 40-12.5 mg Tab; Take 1 tablet by mouth once daily.  Dispense: 90 tablet; Refill: 0    Malignant neoplasm of ovary, unspecified laterality    -     ALPRAZolam (XANAX) 0.25 MG tablet; Take 1 tablet (0.25 mg total) by mouth 3 (three) times daily as needed for Anxiety.  Dispense: 60 tablet; Refill: 0

## 2020-04-02 ENCOUNTER — INFUSION (OUTPATIENT)
Dept: INFUSION THERAPY | Facility: HOSPITAL | Age: 65
End: 2020-04-02
Attending: INTERNAL MEDICINE
Payer: MEDICARE

## 2020-04-02 ENCOUNTER — OFFICE VISIT (OUTPATIENT)
Dept: HEMATOLOGY/ONCOLOGY | Facility: CLINIC | Age: 65
End: 2020-04-02
Payer: MEDICARE

## 2020-04-02 VITALS
HEIGHT: 67 IN | DIASTOLIC BLOOD PRESSURE: 74 MMHG | BODY MASS INDEX: 39.55 KG/M2 | HEART RATE: 67 BPM | SYSTOLIC BLOOD PRESSURE: 125 MMHG | WEIGHT: 252 LBS

## 2020-04-02 VITALS
TEMPERATURE: 98 F | HEART RATE: 87 BPM | RESPIRATION RATE: 18 BRPM | HEIGHT: 67 IN | BODY MASS INDEX: 39.55 KG/M2 | OXYGEN SATURATION: 99 % | DIASTOLIC BLOOD PRESSURE: 84 MMHG | WEIGHT: 252 LBS | SYSTOLIC BLOOD PRESSURE: 136 MMHG

## 2020-04-02 DIAGNOSIS — C78.6 PERITONEAL CARCINOMATOSIS: ICD-10-CM

## 2020-04-02 DIAGNOSIS — C56.1 MALIGNANT NEOPLASM OF RIGHT OVARY: ICD-10-CM

## 2020-04-02 DIAGNOSIS — I10 ESSENTIAL HYPERTENSION: ICD-10-CM

## 2020-04-02 DIAGNOSIS — C56.3 MALIGNANT NEOPLASM OF BOTH OVARIES: ICD-10-CM

## 2020-04-02 DIAGNOSIS — C56.9 MALIGNANT NEOPLASM OF OVARY, UNSPECIFIED LATERALITY: ICD-10-CM

## 2020-04-02 DIAGNOSIS — C56.3 OVARIAN CANCER, BILATERAL: Primary | ICD-10-CM

## 2020-04-02 DIAGNOSIS — C56.1 MALIGNANT NEOPLASM OF RIGHT OVARY: Primary | ICD-10-CM

## 2020-04-02 PROCEDURE — 63600175 PHARM REV CODE 636 W HCPCS: Performed by: INTERNAL MEDICINE

## 2020-04-02 PROCEDURE — 99215 PR OFFICE/OUTPT VISIT, EST, LEVL V, 40-54 MIN: ICD-10-PCS | Mod: 25,95,, | Performed by: INTERNAL MEDICINE

## 2020-04-02 PROCEDURE — 25000003 PHARM REV CODE 250: Performed by: INTERNAL MEDICINE

## 2020-04-02 PROCEDURE — 96413 CHEMO IV INFUSION 1 HR: CPT

## 2020-04-02 PROCEDURE — 99215 OFFICE O/P EST HI 40 MIN: CPT | Mod: 25,95,, | Performed by: INTERNAL MEDICINE

## 2020-04-02 RX ORDER — OLMESARTAN MEDOXOMIL AND HYDROCHLOROTHIAZIDE 40/12.5 40; 12.5 MG/1; MG/1
1 TABLET ORAL DAILY
Qty: 90 TABLET | Refills: 0 | Status: SHIPPED | OUTPATIENT
Start: 2020-04-02 | End: 2020-06-21 | Stop reason: SDUPTHER

## 2020-04-02 RX ORDER — SODIUM CHLORIDE 0.9 % (FLUSH) 0.9 %
10 SYRINGE (ML) INJECTION
Status: DISCONTINUED | OUTPATIENT
Start: 2020-04-02 | End: 2020-04-02 | Stop reason: HOSPADM

## 2020-04-02 RX ORDER — ALPRAZOLAM 0.25 MG/1
0.25 TABLET ORAL 3 TIMES DAILY PRN
Qty: 60 TABLET | Refills: 0 | Status: SHIPPED | OUTPATIENT
Start: 2020-04-02 | End: 2020-06-29 | Stop reason: SDUPTHER

## 2020-04-02 RX ORDER — HEPARIN 100 UNIT/ML
500 SYRINGE INTRAVENOUS
Status: DISCONTINUED | OUTPATIENT
Start: 2020-04-02 | End: 2020-04-02 | Stop reason: HOSPADM

## 2020-04-02 RX ORDER — ALPRAZOLAM 0.25 MG/1
0.25 TABLET ORAL 3 TIMES DAILY PRN
Qty: 60 TABLET | Refills: 0 | Status: SHIPPED | OUTPATIENT
Start: 2020-04-02 | End: 2020-04-02

## 2020-04-02 RX ADMIN — HEPARIN 500 UNITS: 100 SYRINGE at 04:04

## 2020-04-02 RX ADMIN — BEVACIZUMAB 1600 MG: 400 INJECTION, SOLUTION INTRAVENOUS at 03:04

## 2020-04-17 ENCOUNTER — TELEPHONE (OUTPATIENT)
Dept: HEMATOLOGY/ONCOLOGY | Facility: CLINIC | Age: 65
End: 2020-04-17

## 2020-04-17 DIAGNOSIS — C56.3 MALIGNANT NEOPLASM OF BOTH OVARIES: Primary | ICD-10-CM

## 2020-04-17 DIAGNOSIS — Z03.818 ENCOUNTER FOR OBSERVATION FOR SUSPECTED EXPOSURE TO OTHER BIOLOGICAL AGENTS RULED OUT: ICD-10-CM

## 2020-04-17 NOTE — TELEPHONE ENCOUNTER
04/17/2020    Left message for patient.    Unable to reach the patient but left message with the following:  In an effort to protect our immunocompromised patients from potential exposure to COVID-19, [facility] will now require all patients receiving an infusion, an injection, and/or radiation therapy to be tested for COVID-19 prior to their appointment.  All patients currently under treatment will be tested immediately and patients initiating new treatment cycles or with one-time appointments (injections, transfusions, etc.) must be tested within 72 hours of their appointment.    Symptom Patient's Response   Fever YES/NO: lm   Cough YES/NO: lm   Shortness of breath  YES/NO: lm   Difficulty breathing YES/NO: lm   GI symptoms such as diarrhea or nausea YES/NO: lm   Loss of taste YES/NO: lm   Loss of smell YES/NO: lm   Other Screening Patient's Response   Has the patient previously undergone COVID-19 testing? YES/NO: lm;

## 2020-04-20 ENCOUNTER — LAB VISIT (OUTPATIENT)
Dept: LAB | Facility: HOSPITAL | Age: 65
End: 2020-04-20
Attending: INTERNAL MEDICINE
Payer: MEDICARE

## 2020-04-20 DIAGNOSIS — Z86.79 HISTORY OF CHF (CONGESTIVE HEART FAILURE): ICD-10-CM

## 2020-04-20 DIAGNOSIS — Z86.79 HISTORY OF CHF (CONGESTIVE HEART FAILURE): Primary | ICD-10-CM

## 2020-04-20 PROCEDURE — U0002 COVID-19 LAB TEST NON-CDC: HCPCS

## 2020-04-21 LAB — SARS-COV-2 RNA RESP QL NAA+PROBE: NOT DETECTED

## 2020-04-23 ENCOUNTER — OFFICE VISIT (OUTPATIENT)
Dept: HEMATOLOGY/ONCOLOGY | Facility: CLINIC | Age: 65
End: 2020-04-23
Payer: MEDICARE

## 2020-04-23 ENCOUNTER — INFUSION (OUTPATIENT)
Dept: INFUSION THERAPY | Facility: HOSPITAL | Age: 65
End: 2020-04-23
Attending: INTERNAL MEDICINE
Payer: MEDICARE

## 2020-04-23 VITALS
HEART RATE: 68 BPM | SYSTOLIC BLOOD PRESSURE: 143 MMHG | RESPIRATION RATE: 18 BRPM | DIASTOLIC BLOOD PRESSURE: 83 MMHG | TEMPERATURE: 98 F | BODY MASS INDEX: 39.78 KG/M2 | OXYGEN SATURATION: 99 % | WEIGHT: 254 LBS

## 2020-04-23 VITALS
SYSTOLIC BLOOD PRESSURE: 146 MMHG | DIASTOLIC BLOOD PRESSURE: 87 MMHG | WEIGHT: 254 LBS | HEART RATE: 89 BPM | OXYGEN SATURATION: 98 % | TEMPERATURE: 98 F | BODY MASS INDEX: 39.87 KG/M2 | HEIGHT: 67 IN

## 2020-04-23 DIAGNOSIS — C78.6 PERITONEAL CARCINOMATOSIS: ICD-10-CM

## 2020-04-23 DIAGNOSIS — C56.3 OVARIAN CANCER, BILATERAL: ICD-10-CM

## 2020-04-23 DIAGNOSIS — M79.89 SWELLING OF RIGHT HAND: ICD-10-CM

## 2020-04-23 DIAGNOSIS — R21 RASH: ICD-10-CM

## 2020-04-23 DIAGNOSIS — C56.3 MALIGNANT NEOPLASM OF BOTH OVARIES: ICD-10-CM

## 2020-04-23 DIAGNOSIS — Z03.818 ENCOUNTER FOR OBSERVATION FOR SUSPECTED EXPOSURE TO OTHER BIOLOGICAL AGENTS RULED OUT: ICD-10-CM

## 2020-04-23 DIAGNOSIS — C56.3 OVARIAN CANCER, BILATERAL: Primary | ICD-10-CM

## 2020-04-23 DIAGNOSIS — C56.1 MALIGNANT NEOPLASM OF RIGHT OVARY: ICD-10-CM

## 2020-04-23 DIAGNOSIS — C56.1 MALIGNANT NEOPLASM OF RIGHT OVARY: Primary | ICD-10-CM

## 2020-04-23 PROCEDURE — 99443 PR PHYSICIAN TELEPHONE EVALUATION 21-30 MIN: CPT | Mod: 95,,, | Performed by: INTERNAL MEDICINE

## 2020-04-23 PROCEDURE — 25000003 PHARM REV CODE 250: Performed by: INTERNAL MEDICINE

## 2020-04-23 PROCEDURE — 63600175 PHARM REV CODE 636 W HCPCS: Performed by: INTERNAL MEDICINE

## 2020-04-23 PROCEDURE — 99443 PR PHYSICIAN TELEPHONE EVALUATION 21-30 MIN: ICD-10-PCS | Mod: 95,,, | Performed by: INTERNAL MEDICINE

## 2020-04-23 PROCEDURE — 96413 CHEMO IV INFUSION 1 HR: CPT

## 2020-04-23 RX ORDER — SODIUM CHLORIDE 0.9 % (FLUSH) 0.9 %
10 SYRINGE (ML) INJECTION
Status: CANCELLED | OUTPATIENT
Start: 2020-04-23

## 2020-04-23 RX ORDER — EPINEPHRINE 0.3 MG/.3ML
0.3 INJECTION SUBCUTANEOUS ONCE AS NEEDED
Status: CANCELLED | OUTPATIENT
Start: 2020-04-23

## 2020-04-23 RX ORDER — TRIAMCINOLONE ACETONIDE 1 MG/G
CREAM TOPICAL 2 TIMES DAILY
Qty: 15 G | Refills: 0 | Status: SHIPPED | OUTPATIENT
Start: 2020-04-23 | End: 2020-09-22

## 2020-04-23 RX ORDER — HEPARIN 100 UNIT/ML
500 SYRINGE INTRAVENOUS
Status: DISCONTINUED | OUTPATIENT
Start: 2020-04-23 | End: 2020-04-23 | Stop reason: HOSPADM

## 2020-04-23 RX ORDER — HEPARIN 100 UNIT/ML
500 SYRINGE INTRAVENOUS
Status: CANCELLED | OUTPATIENT
Start: 2020-04-23

## 2020-04-23 RX ORDER — DIPHENHYDRAMINE HYDROCHLORIDE 50 MG/ML
25 INJECTION INTRAMUSCULAR; INTRAVENOUS EVERY 10 MIN PRN
Status: CANCELLED | OUTPATIENT
Start: 2020-04-23

## 2020-04-23 RX ORDER — METHYLPREDNISOLONE SOD SUCC 125 MG
125 VIAL (EA) INJECTION ONCE AS NEEDED
Status: CANCELLED | OUTPATIENT
Start: 2020-04-23

## 2020-04-23 RX ADMIN — BEVACIZUMAB 1615 MG: 400 INJECTION, SOLUTION INTRAVENOUS at 02:04

## 2020-04-23 RX ADMIN — HEPARIN 500 UNITS: 100 SYRINGE at 03:04

## 2020-04-23 NOTE — PLAN OF CARE
Patient states she feels well overall today. Patient expresses that she has been waiting in the waiting room for three hours. Miscommunication clearly happened, but infusion nurses did not know patient was here and waiting for treatment. I apologized several times to the patient... she was very kind and understanding. Issue brought to our manager, Tiffany.  Patient now in chair, labs reviewed, vitals stable. Treatment plan in process.

## 2020-04-23 NOTE — PROGRESS NOTES
Subjective:       Patient ID: Casie Gaines is a 64 y.o. female.    Chief Complaint: Peritoneal carcinomatosis [C78.6, C80.1]  HPI: We have an opportunity to see Ms. Casie Gaines in Hematology Oncology clinic at [facility] on 04/22/2020.  Ms. Casie Gaines is a 64 y.o. woman with peritoneal carcinomatosis with malignant right pleural effusion.  Final pathology is consistent with ovarian primary with  2740.  PET scan demonstrates multiple omental and peritoneal avid masses consistent with peritoneal carcinomatosis, extensive retroperitoneal and mediastinal lymphadenopathy, masslike structure in the right iliac fossa likely representing ovarian mass.  Patient has been evaluated by GYN Oncology Dr. Juarez and MD Green with recommendations for him carboplatin/Taxol/Avastin.      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  S/p 6 cycle avastin taxol carboplatin.  Has great ND after 6 cycles. On 8/15/2019 underwent salpingoophorectomy. Pathology showed CR. Currently on maintenance avastin.       Peritoneal carcinomatosis    1/26/2019 Initial Diagnosis     Peritoneal carcinomatosis      2/15/2019 - 7/8/2019 Chemotherapy     Treatment Summary   Plan Name: OP GYN PACLITAXEL CARBOPLATIN (AUC 6) Q3W  Treatment Goal: Palliative  Status: Inactive  Start Date: 2/28/2019  End Date: 6/18/2019  Provider: Will Trevizo Jr., MD  Chemotherapy: bevacizumab (AVASTIN) 15 mg/kg = 1,600 mg in sodium chloride 0.9% 100 mL chemo infusion, 15 mg/kg = 1,600 mg (100 % of original dose 15 mg/kg), Intravenous, Clinic/HOD 1 time, 6 of 6 cycles  Dose modification: 15 mg/kg (original dose 15 mg/kg, Cycle 1)  Administration: 1,600 mg (2/28/2019), 1,600 mg (3/21/2019), 1,600 mg (4/11/2019), 1,600 mg (5/7/2019), 1,600 mg (5/28/2019), 1,510 mg (6/18/2019)  CARBOplatin (PARAPLATIN) 870 mg in sodium chloride 0.9% 337 mL chemo infusion, 870 mg (100 % of original dose 868.8 mg), Intravenous,  Clinic/HOD 1 time, 6 of 6 cycles  Dose modification:   (original dose 868.8 mg, Cycle 1)  Administration: 870 mg (2/28/2019), 870 mg (3/21/2019), 900 mg (4/11/2019), 870 mg (5/7/2019), 660 mg (5/28/2019), 690 mg (6/18/2019)  PACLitaxel (TAXOL) 175 mg/m2 = 396 mg in sodium chloride 0.9% 566 mL chemo infusion, 175 mg/m2 = 396 mg, Intravenous, Clinic/HOD 1 time, 6 of 6 cycles  Dose modification: 140 mg/m2 (80 % of original dose 175 mg/m2, Cycle 5)  Administration: 396 mg (2/28/2019), 396 mg (3/21/2019), 396 mg (4/11/2019), 396 mg (5/7/2019), 318 mg (5/28/2019), 306 mg (6/18/2019)      10/8/2019 -  Chemotherapy     Treatment Summary   Plan Name: OP BEVACIZUMAB Q3W   Treatment Goal: Maintenance  Status: Active  Start Date: 10/9/2019  End Date: 12/10/2020 (Planned)  Provider: Oscar Mckeon MD  Chemotherapy: bevacizumab (AVASTIN) 15 mg/kg = 1,615 mg in sodium chloride 0.9% 100 mL chemo infusion, 15 mg/kg = 1,615 mg, Intravenous, Clinic/HOD 1 time, 5 of 17 cycles  Administration: 1,615 mg (10/9/2019), 1,615 mg (10/29/2019), 1,615 mg (12/10/2019), 1,615 mg (1/21/2020), 1,600 mg (4/2/2020)        Malignant neoplasm of right ovary    2/15/2019 Initial Diagnosis     Malignant neoplasm of right ovary      2/15/2019 - 7/8/2019 Chemotherapy     Treatment Summary   Plan Name: OP GYN PACLITAXEL CARBOPLATIN (AUC 6) Q3W  Treatment Goal: Palliative  Status: Inactive  Start Date: 2/28/2019  End Date: 6/18/2019  Provider: Will Trevizo Jr., MD  Chemotherapy: bevacizumab (AVASTIN) 15 mg/kg = 1,600 mg in sodium chloride 0.9% 100 mL chemo infusion, 15 mg/kg = 1,600 mg (100 % of original dose 15 mg/kg), Intravenous, [facility] [place] 1 time, 6 of 6 cycles  Dose modification: 15 mg/kg (original dose 15 mg/kg, Cycle 1)  Administration: 1,600 mg (2/28/2019), 1,600 mg (3/21/2019), 1,600 mg (4/11/2019), 1,600 mg (5/7/2019), 1,600 mg (5/28/2019), 1,510 mg (6/18/2019)  CARBOplatin (PARAPLATIN) 870 mg in sodium chloride 0.9% 337 mL chemo infusion, 870  mg (100 % of original dose 868.8 mg), Intravenous, Clinic/HOD 1 time, 6 of 6 cycles  Dose modification:   (original dose 868.8 mg, Cycle 1)  Administration: 870 mg (2/28/2019), 870 mg (3/21/2019), 900 mg (4/11/2019), 870 mg (5/7/2019), 660 mg (5/28/2019), 690 mg (6/18/2019)  PACLitaxel (TAXOL) 175 mg/m2 = 396 mg in sodium chloride 0.9% 566 mL chemo infusion, 175 mg/m2 = 396 mg, Intravenous, Clinic/HOD 1 time, 6 of 6 cycles  Dose modification: 140 mg/m2 (80 % of original dose 175 mg/m2, Cycle 5)  Administration: 396 mg (2/28/2019), 396 mg (3/21/2019), 396 mg (4/11/2019), 396 mg (5/7/2019), 318 mg (5/28/2019), 306 mg (6/18/2019)      10/8/2019 -  Chemotherapy     Treatment Summary   Plan Name: OP BEVACIZUMAB Q3W   Treatment Goal: Maintenance  Status: Active  Start Date: 10/9/2019  End Date: 12/10/2020 (Planned)  Provider: Oscar Mckeon MD  Chemotherapy: bevacizumab (AVASTIN) 15 mg/kg = 1,615 mg in sodium chloride 0.9% 100 mL chemo infusion, 15 mg/kg = 1,615 mg, Intravenous, Clinic/HOD 1 time, 5 of 17 cycles  Administration: 1,615 mg (10/9/2019), 1,615 mg (10/29/2019), 1,615 mg (12/10/2019), 1,615 mg (1/21/2020), 1,600 mg (4/2/2020)        Malignant neoplasm of both ovaries    2/18/2019 Initial Diagnosis     Ovarian cancer      10/8/2019 -  Chemotherapy     Treatment Summary   Plan Name: OP BEVACIZUMAB Q3W   Treatment Goal: Maintenance  Status: Active  Start Date: 10/9/2019  End Date: 12/10/2020 (Planned)  Provider: Oscar Mckeon MD  Chemotherapy: bevacizumab (AVASTIN) 15 mg/kg = 1,615 mg in sodium chloride 0.9% 100 mL chemo infusion, 15 mg/kg = 1,615 mg, Intravenous, Clinic/HOD 1 time, 5 of 17 cycles  Administration: 1,615 mg (10/9/2019), 1,615 mg (10/29/2019), 1,615 mg (12/10/2019), 1,615 mg (1/21/2020), 1,600 mg (4/2/2020)        Ovarian cancer, bilateral    8/15/2019 Initial Diagnosis     Ovarian cancer, bilateral      10/8/2019 -  Chemotherapy     Treatment Summary   Plan Name: OP BEVACIZUMAB Q3W   Treatment Goal:  Maintenance  Status: Active  Start Date: 10/9/2019  End Date: 12/10/2020 (Planned)  Provider: Oscar Mckeon MD  Chemotherapy: bevacizumab (AVASTIN) 15 mg/kg = 1,615 mg in sodium chloride 0.9% 100 mL chemo infusion, 15 mg/kg = 1,615 mg, Intravenous, Clinic/HOD 1 time, 5 of 17 cycles  Administration: 1,615 mg (10/9/2019), 1,615 mg (10/29/2019), 1,615 mg (12/10/2019), 1,615 mg (2020), 1,600 mg (2020)       Past Medical History:   Diagnosis Date    Anemia     Anxiety     Arthritis     knees    Cancer     ovarian    CHF (congestive heart failure)     Hx antineoplastic chemotherapy     last 2019    Hyperlipemia     Hypertension     Neck pain     Ovarian cancer 2019    CHEMO     Family History   Problem Relation Age of Onset    Anesthesia problems Other     Breast cancer Maternal Aunt     Breast cancer Paternal Aunt     Ovarian cancer Paternal Aunt     Colon cancer Brother     Thrombophilia Neg Hx      Social History     Socioeconomic History    Marital status:      Spouse name: Not on file    Number of children: Not on file    Years of education: Not on file    Highest education level: Not on file   Occupational History    Not on file   Social Needs    Financial resource strain: Not on file    Food insecurity:     Worry: Not on file     Inability: Not on file    Transportation needs:     Medical: Not on file     Non-medical: Not on file   Tobacco Use    Smoking status: Former Smoker     Packs/day: 1.00     Years: 25.00     Pack years: 25.00     Last attempt to quit: 10/1/2018     Years since quittin.5    Smokeless tobacco: Never Used    Tobacco comment: States started quit 2 months ago after 30 years   Substance and Sexual Activity    Alcohol use: Never     Alcohol/week: 0.0 standard drinks     Frequency: Never     Comment: occasionally  No alcohol 72h prior to sx    Drug use: No    Sexual activity: Not Currently     Partners: Male     Comment: hyst; mut monog    Lifestyle    Physical activity:     Days per week: Not on file     Minutes per session: Not on file    Stress: Only a little   Relationships    Social connections:     Talks on phone: Not on file     Gets together: Not on file     Attends Confucianism service: Not on file     Active member of club or organization: Not on file     Attends meetings of clubs or organizations: Not on file     Relationship status: Not on file   Other Topics Concern    Not on file   Social History Narrative    Live w/ spouse and 2 dogs      Past Surgical History:   Procedure Laterality Date    breast reduction  10/02/2018     SECTION      X 1    CYSTOSCOPY W/ URETERAL STENT PLACEMENT Right 2019    Procedure: CYSTOSCOPY, WITH URETERAL STENT INSERTION;  Surgeon: Timmy Santiago IV, MD;  Location: [facility];  Service: Urology;  Laterality: Right;    CYSTOSCOPY W/ URETERAL STENT REMOVAL  10/04/2019    DILATION AND CURETTAGE OF UTERUS      HYSTERECTOMY      RALH for fibroids (still has ovaries)    INSERTION OF VENOUS ACCESS PORT Left 2019    Procedure: INSERTION, VENOUS ACCESS PORT;  Surgeon: Ulisses Monzon MD;  [facility];  Service: General;  Laterality: Left;  Left internal jugular     LYSIS OF ADHESIONS OF URETER N/A 8/15/2019    Procedure: URETEROLYSIS;  Surgeon: Ismael Juarez MD;  Location: [facility];  Service: OB/GYN;  Laterality: N/A;    OMENTECTOMY N/A 8/15/2019    Procedure: OMENTECTOMY;  Surgeon: Ismael Juarez MD;  Location: [facility];  Service: OB/GYN;  Laterality: N/A;    RETROGRADE PYELOGRAPHY Right 2019    Procedure: PYELOGRAM, RETROGRADE;  Surgeon: Timmy Santiago IV, MD;  Location: [facility];  Service: Urology;  Laterality: Right;    ROBOT-ASSISTED LAPAROSCOPIC SALPINGO-OOPHORECTOMY USING DA TIA XI Bilateral 8/15/2019    Procedure: XI ROBOTIC SALPINGO-OOPHORECTOMY;  Surgeon: Ismael Juarez MD;  Location: [facility];  Service: OB/GYN;  Laterality: Bilateral;    TOTAL REDUCTION MAMMOPLASTY   2018    TUBAL LIGATION       Current Outpatient Medications   Medication Sig Dispense Refill    albuterol 90 mcg/actuation inhaler Inhale 2 puffs into the lungs every 4 (four) hours as needed for Wheezing. Rescue 18 g 0    ALPRAZolam (XANAX) 0.25 MG tablet Take 1 tablet (0.25 mg total) by mouth 3 (three) times daily as needed for Anxiety. 60 tablet 0    amLODIPine (NORVASC) 5 MG tablet Take 2 tablets (10 mg total) by mouth once daily. 60 tablet 11    biotin 1 mg tablet Take 1,000 mcg by mouth once daily.       cyclobenzaprine (FLEXERIL) 5 MG tablet Take 1.5 tablets (7.5 mg total) by mouth 3 (three) times daily as needed (muscle spasm). 90 tablet 0    diclofenac sodium (VOLTAREN) 1 % Gel Apply once a day to back as needed 100 g 1    gabapentin (NEURONTIN) 100 MG capsule Take 1 capsule (100 mg total) by mouth 3 (three) times daily. 90 capsule 11    HYDROcodone-acetaminophen (NORCO) 5-325 mg per tablet Take 1 tablet by mouth every 6 (six) hours as needed. 20 tablet 0    lidocaine-prilocaine (EMLA) cream Apply topically as needed. 30 g 1    multivitamin with minerals tablet Take 1 tablet by mouth once daily.       olmesartan-hydrochlorothiazide (BENICAR HCT) 40-12.5 mg Tab Take 1 tablet by mouth once daily. 90 tablet 0    oxyCODONE-acetaminophen (PERCOCET)  mg per tablet Take 1 tablet by mouth every 8 (eight) hours as needed. (Patient not taking: Reported on 4/2/2020) 20 tablet 0    rosuvastatin (CRESTOR) 10 MG tablet Take 1 tablet (10 mg total) by mouth once daily. 90 tablet 1    sertraline (ZOLOFT) 25 MG tablet Take 1 tablet (25 mg total) by mouth once daily. 30 tablet 11    tolterodine (DETROL) 2 MG Tab Take 1 tablet (2 mg total) by mouth 2 (two) times daily. 60 tablet 11    zinc gluconate 50 mg tablet Take 50 mg by mouth once daily.       No current facility-administered medications for this visit.        Labs:  Lab Results   Component Value Date    WBC 9.65 04/02/2020    HGB 11.4 (L)  04/02/2020    HCT 37.1 04/02/2020    MCV 90 04/02/2020     04/02/2020     BMP  Lab Results   Component Value Date     04/02/2020    K 3.4 (L) 04/02/2020     04/02/2020    CO2 28 04/02/2020    BUN 10 04/02/2020    CREATININE 0.8 04/02/2020    CALCIUM 9.4 04/02/2020    ANIONGAP 8 04/02/2020    ESTGFRAFRICA >60 04/02/2020    EGFRNONAA >60 04/02/2020     Lab Results   Component Value Date    ALT 35 04/02/2020    AST 29 04/02/2020    ALKPHOS 103 04/02/2020    BILITOT 0.5 04/02/2020       No results found for: IRON, TIBC, FERRITIN, SATURATEDIRO  No results found for: FUNOEVKR98  No results found for: FOLATE  Lab Results   Component Value Date    TSH 1.869 04/19/2013       I have reviewed the radiology reports and examined the scan/xray images.    Review of Systems   Constitutional: Negative.    HENT: Negative.    Eyes: Negative.    Respiratory: Negative.    Cardiovascular: Negative.    Gastrointestinal: Negative.    Endocrine: Negative.    Genitourinary: Negative.    Musculoskeletal: Negative.    Skin: Negative.    Allergic/Immunologic: Negative.    Neurological: Negative.    Hematological: Negative.    Psychiatric/Behavioral: Negative.      ECOG SCORE    0 - Fully active-able to carry on all pre-disease performance without restriction            Objective:   There were no vitals filed for this visit.There is no height or weight on file to calculate BMI.  Physical Exam      Assessment:      1. Peritoneal carcinomatosis    2. Malignant neoplasm of right ovary    3. Malignant neoplasm of both ovaries    4. Ovarian cancer, bilateral    5. Rash           Plan:     Peritoneal carcinomatosis  Continue on adjuvant avastin  -     Urinalysis; Future; Expected date: 04/23/2020    Malignant neoplasm of right ovary    Malignant neoplasm of both ovaries    Ovarian cancer, bilateral    Rash  -     diphenhydrAMINE-zinc acetate 1-0.1% (BENADRYL ITCH STOPPING) cream; Apply topically 2 (two) times daily as needed for  Itching.  Dispense: 28 g; Refill: 0  -     triamcinolone acetonide 0.1% (KENALOG) 0.1 % cream; Apply topically 2 (two) times daily.  Dispense: 15 g; Refill: 0    Consult Start Time: 04/23/2020 11:00  Consult End Time: 04/23/2020 11:25        The patient location is: [facility]  The chief complaint leading to consultation is: ovarian cancer  Visit type: audio only  Total time spent with patient: 25 minutes  Each patient to whom he or she provides medical services by telemedicine is:  (1) informed of the relationship between the physician and patient and the respective role of any other health care provider with respect to management of the patient; and (2) notified that he or she may decline to receive medical services by telemedicine and may withdraw from such care at any time.    Notes: as above

## 2020-04-28 RX ORDER — METHYLPREDNISOLONE 4 MG/1
TABLET ORAL
Qty: 21 TABLET | Refills: 0 | Status: SHIPPED | OUTPATIENT
Start: 2020-04-28 | End: 2020-05-06

## 2020-04-29 DIAGNOSIS — C56.1 MALIGNANT NEOPLASM OF RIGHT OVARY: Primary | ICD-10-CM

## 2020-04-30 ENCOUNTER — HOSPITAL ENCOUNTER (OUTPATIENT)
Dept: RADIOLOGY | Facility: HOSPITAL | Age: 65
Discharge: HOME | End: 2020-04-30
Attending: INTERNAL MEDICINE
Payer: MEDICARE

## 2020-04-30 ENCOUNTER — TELEPHONE (OUTPATIENT)
Dept: ALLERGY | Facility: CLINIC | Age: 65
End: 2020-04-30

## 2020-04-30 DIAGNOSIS — M79.89 SWELLING OF RIGHT HAND: ICD-10-CM

## 2020-04-30 PROCEDURE — 93971 EXTREMITY STUDY: CPT | Mod: TC,RT

## 2020-04-30 PROCEDURE — 76882 US LMTD JT/FCL EVL NVASC XTR: CPT | Mod: TC,59

## 2020-04-30 NOTE — TELEPHONE ENCOUNTER
Patient stated her Phone will not do Telemedicine visits. Appointment scheduled to come in 5/4/20 at 9:30 a.m.

## 2020-05-01 ENCOUNTER — PATIENT OUTREACH (OUTPATIENT)
Dept: ADMINISTRATIVE | Facility: OTHER | Age: 65
End: 2020-05-01

## 2020-05-04 ENCOUNTER — OFFICE VISIT (OUTPATIENT)
Dept: ALLERGY | Facility: CLINIC | Age: 65
End: 2020-05-04
Payer: MEDICARE

## 2020-05-04 ENCOUNTER — LAB VISIT (OUTPATIENT)
Dept: LAB | Facility: HOSPITAL | Age: 65
End: 2020-05-04
Attending: ALLERGY & IMMUNOLOGY
Payer: MEDICARE

## 2020-05-04 VITALS
TEMPERATURE: 97 F | HEART RATE: 88 BPM | BODY MASS INDEX: 40.24 KG/M2 | HEIGHT: 67 IN | DIASTOLIC BLOOD PRESSURE: 87 MMHG | SYSTOLIC BLOOD PRESSURE: 145 MMHG | WEIGHT: 256.38 LBS

## 2020-05-04 DIAGNOSIS — M79.89 SWELLING OF RIGHT HAND: Primary | ICD-10-CM

## 2020-05-04 DIAGNOSIS — M79.89 SWELLING OF RIGHT HAND: ICD-10-CM

## 2020-05-04 DIAGNOSIS — L98.9 SKIN LESION: ICD-10-CM

## 2020-05-04 LAB
C3 SERPL-MCNC: 147 MG/DL (ref 50–180)
C4 SERPL-MCNC: 32 MG/DL (ref 11–44)
IGE SERPL-ACNC: 83 IU/ML (ref 0–100)

## 2020-05-04 PROCEDURE — 86160 COMPLEMENT ANTIGEN: CPT

## 2020-05-04 PROCEDURE — 82785 ASSAY OF IGE: CPT

## 2020-05-04 PROCEDURE — 99999 PR PBB SHADOW E&M-EST. PATIENT-LVL V: ICD-10-PCS | Mod: PBBFAC,,, | Performed by: ALLERGY & IMMUNOLOGY

## 2020-05-04 PROCEDURE — 86161 COMPLEMENT/FUNCTION ACTIVITY: CPT

## 2020-05-04 PROCEDURE — 84443 ASSAY THYROID STIM HORMONE: CPT

## 2020-05-04 PROCEDURE — 86038 ANTINUCLEAR ANTIBODIES: CPT

## 2020-05-04 PROCEDURE — 99215 OFFICE O/P EST HI 40 MIN: CPT | Mod: PBBFAC | Performed by: ALLERGY & IMMUNOLOGY

## 2020-05-04 PROCEDURE — 99204 PR OFFICE/OUTPT VISIT, NEW, LEVL IV, 45-59 MIN: ICD-10-PCS | Mod: S$PBB,,, | Performed by: ALLERGY & IMMUNOLOGY

## 2020-05-04 PROCEDURE — 86332 IMMUNE COMPLEX ASSAY: CPT

## 2020-05-04 PROCEDURE — 86235 NUCLEAR ANTIGEN ANTIBODY: CPT | Mod: 59

## 2020-05-04 PROCEDURE — 86160 COMPLEMENT ANTIGEN: CPT | Mod: 59

## 2020-05-04 PROCEDURE — 86039 ANTINUCLEAR ANTIBODIES (ANA): CPT

## 2020-05-04 PROCEDURE — 86003 ALLG SPEC IGE CRUDE XTRC EA: CPT | Mod: 59

## 2020-05-04 PROCEDURE — 86832 HLA CLASS I HIGH DEFIN QUAL: CPT

## 2020-05-04 PROCEDURE — 99204 OFFICE O/P NEW MOD 45 MIN: CPT | Mod: S$PBB,,, | Performed by: ALLERGY & IMMUNOLOGY

## 2020-05-04 PROCEDURE — 86003 ALLG SPEC IGE CRUDE XTRC EA: CPT

## 2020-05-04 PROCEDURE — 36415 COLL VENOUS BLD VENIPUNCTURE: CPT

## 2020-05-04 PROCEDURE — 99999 PR PBB SHADOW E&M-EST. PATIENT-LVL V: CPT | Mod: PBBFAC,,, | Performed by: ALLERGY & IMMUNOLOGY

## 2020-05-04 PROCEDURE — 86833 HLA CLASS II HIGH DEFIN QUAL: CPT

## 2020-05-04 RX ORDER — CETIRIZINE HYDROCHLORIDE 10 MG/1
10 TABLET ORAL DAILY
Qty: 30 TABLET | Refills: 5 | Status: SHIPPED | OUTPATIENT
Start: 2020-05-04 | End: 2021-12-22 | Stop reason: SDUPTHER

## 2020-05-04 NOTE — LETTER
May 4, 2020      Oscar Mckeon MD  [address]  [place] [address]           [facility] Allergy/ Immunology  [address] [facility] [address]  Phone: [phone]  Fax: [phone]          Patient: Casie Gaines   MR Number: [id number]   YOB: 1955   Date of Visit: 5/4/2020       Dear Dr. Oscar Mckeon:    Thank you for referring Casie Gaines to me for evaluation. Attached you will find relevant portions of my assessment and plan of care.    If you have questions, please do not hesitate to call me. I look forward to following Casie Gaines along with you.    Sincerely,    Bettye Chua MD    Enclosure  CC:  No Recipients    If you would like to receive this communication electronically, please contact [email or web address] or [phone] to request more information on [employer] access.    For providers and/or their staff who would like to refer a patient to [facility], please contact us through our one-stop-shop provider referral line, [facility], at [phone].    If you feel you have received this communication in error or would no longer like to receive these types of communications, please e-mail [email or web address]

## 2020-05-04 NOTE — PROGRESS NOTES
Subjective:       Patient ID: Casie Gaines is a 64 y.o. female.    Chief Complaint:  Other (hand swelling and itching)      HPI:  64 year old female referred with complaints of right hand swelling and itching for 24 hours. She received a medrol dose pack from her hemo/oncologist, which caused symptoms to subside over 24-48 hours.  She reports itching and hives on her body after starting chemotherapy. Symptoms started at the end of March. Hives and pruritus occur a few days after receiving chemotherapy for 3-4 days. She has placed neosporin on the area affected. Her medrol dose pack helped alleviate symptoms. She is taking Avastin every 28 days, provided she does not have too much protein in her urine.  She denies tongue or throat swelling or inability to take or breath. She denies anaphylaxis.    Past Medical History:   Diagnosis Date    Anemia     Anxiety     Arthritis     knees    Cancer     ovarian    CHF (congestive heart failure)     Hx antineoplastic chemotherapy     last 6/2019    Hyperlipemia     Hypertension     Neck pain     Ovarian cancer 2019    CHEMO     Family History   Problem Relation Age of Onset    Anesthesia problems Other     Breast cancer Maternal Aunt     Breast cancer Paternal Aunt     Ovarian cancer Paternal Aunt     Colon cancer Brother     Thrombophilia Neg Hx      Review of patient's allergies indicates:  No Known Allergies     Current Outpatient Medications on File Prior to Visit   Medication Sig Dispense Refill    albuterol 90 mcg/actuation inhaler Inhale 2 puffs into the lungs every 4 (four) hours as needed for Wheezing. Rescue 18 g 0    ALPRAZolam (XANAX) 0.25 MG tablet Take 1 tablet (0.25 mg total) by mouth 3 (three) times daily as needed for Anxiety. 60 tablet 0    amLODIPine (NORVASC) 5 MG tablet Take 2 tablets (10 mg total) by mouth once daily. 60 tablet 11    biotin 1 mg tablet Take 1,000 mcg by mouth once daily.       diclofenac sodium (VOLTAREN) 1 %  Gel Apply once a day to back as needed 100 g 1    diphenhydrAMINE-zinc acetate 1-0.1% (BENADRYL ITCH STOPPING) cream Apply topically 2 (two) times daily as needed for Itching. 28 g 0    gabapentin (NEURONTIN) 100 MG capsule Take 1 capsule (100 mg total) by mouth 3 (three) times daily. 90 capsule 11    HYDROcodone-acetaminophen (NORCO) 5-325 mg per tablet Take 1 tablet by mouth every 6 (six) hours as needed. 20 tablet 0    lidocaine-prilocaine (EMLA) cream Apply topically as needed. 30 g 1    methylPREDNISolone (MEDROL DOSEPACK) 4 mg tablet use as directed 21 tablet 0    multivitamin with minerals tablet Take 1 tablet by mouth once daily.       olmesartan-hydrochlorothiazide (BENICAR HCT) 40-12.5 mg Tab Take 1 tablet by mouth once daily. 90 tablet 0    oxyCODONE-acetaminophen (PERCOCET)  mg per tablet Take 1 tablet by mouth every 8 (eight) hours as needed. 20 tablet 0    rosuvastatin (CRESTOR) 10 MG tablet Take 1 tablet (10 mg total) by mouth once daily. 90 tablet 1    sertraline (ZOLOFT) 25 MG tablet Take 1 tablet (25 mg total) by mouth once daily. 30 tablet 11    triamcinolone acetonide 0.1% (KENALOG) 0.1 % cream Apply topically 2 (two) times daily. 15 g 0    zinc gluconate 50 mg tablet Take 50 mg by mouth once daily.      diphenhydrAMINE-aluminum-magnesium hydroxide-simethicone-lidocaine HCl 2% Swish and spit 15 mLs every 4 (four) hours as needed. (Patient not taking: Reported on 5/4/2020) 500 mL 0    magic mouthwash diphen/antac/lidoc Swish and spit 15 mLs every 4 (four) hours as needed. (Patient not taking: Reported on 5/4/2020) 450 mL 0    tolterodine (DETROL) 2 MG Tab Take 1 tablet (2 mg total) by mouth 2 (two) times daily. 60 tablet 11     No current facility-administered medications on file prior to visit.        Environmental History: Dogs. She is not a smoker.  Review of Systems   Constitutional: Negative for chills and fever.   HENT: Negative for congestion and rhinorrhea.    Eyes:  Negative for discharge and itching.   Respiratory: Negative for choking, chest tightness and shortness of breath.    Cardiovascular: Negative for chest pain and leg swelling.   Gastrointestinal: Negative for nausea and vomiting.   Endocrine: Negative for cold intolerance and heat intolerance.   Genitourinary: Negative for dysuria and frequency.   Musculoskeletal: Negative for gait problem and joint swelling.   Skin: Positive for rash. Negative for wound.   Allergic/Immunologic: Positive for immunocompromised state. Negative for food allergies.   Neurological: Negative for dizziness and light-headedness.   Hematological: Negative for adenopathy. Does not bruise/bleed easily.   Psychiatric/Behavioral: Negative for agitation and confusion.        Objective:    Physical Exam   Constitutional: She is oriented to person, place, and time. She appears well-developed and well-nourished. No distress.   HENT:   Head: Normocephalic and atraumatic.   Right Ear: External ear normal.   Left Ear: External ear normal.   Nose: Nose normal.   Mouth/Throat: Oropharynx is clear and moist. No oropharyngeal exudate.   Eyes: Pupils are equal, round, and reactive to light. Right eye exhibits no discharge. Left eye exhibits no discharge. No scleral icterus.   Neck: Normal range of motion. Neck supple. No thyromegaly present.   Cardiovascular: Normal rate, regular rhythm and normal heart sounds.   No murmur heard.  Pulmonary/Chest: Effort normal and breath sounds normal. No stridor. No respiratory distress. She has no wheezes. She has no rales.   Abdominal: Soft. Bowel sounds are normal. She exhibits no distension. There is no tenderness. There is no guarding.   Musculoskeletal: Normal range of motion. She exhibits no edema.   Lymphadenopathy:     She has no cervical adenopathy.   Neurological: She is alert and oriented to person, place, and time.   Skin: Skin is warm. She is not diaphoretic. No erythema. No pallor.   Mold- no irregular  borders in the middle of her back   Psychiatric: She has a normal mood and affect. Her behavior is normal. Judgment and thought content normal.   Vitals reviewed.        Assessment:       1. Swelling of right hand    2. Skin lesion         Plan:       Swelling of right hand  -     Ambulatory referral/consult to Allergy  -     CU (Chronic Urticaria) Index Panel; Future; Expected date: 05/04/2020  -     C1 ESTERASE INHIBITOR PANEL; Future; Expected date: 05/04/2020  -     C1 ESTERASE INHIBITOR, FUNCTIONAL; Future; Expected date: 05/04/2020  -     C1Q BINDING ASSAY; Future; Expected date: 05/04/2020  -     HLA Class I & II C1q PRA Specificity; Future; Expected date: 05/04/2020  -     C3 complement; Future; Expected date: 05/04/2020  -     C4 complement; Future; Expected date: 05/04/2020  -     LOYD Screen w/Reflex; Future; Expected date: 05/04/2020  -     IgE; Future; Expected date: 05/04/2020  -     Bahia grass IgE; Future; Expected date: 05/04/2020  -     Aspergillus fumagatus IgE; Future; Expected date: 05/04/2020  -     Chaetomium globosum IgE; Future; Expected date: 05/04/2020  -     Cockroach, American IgE; Future; Expected date: 05/04/2020  -     Cladosporium IgE; Future; Expected date: 05/04/2020  -     Curvularia lunata IgE; Future; Expected date: 05/04/2020  -     D. farinae IgE; Future; Expected date: 05/04/2020  -     D. pteronyssinus IgE; Future; Expected date: 05/04/2020  -     Dog dander IgE; Future; Expected date: 05/04/2020  -     Plantain, English IgE; Future; Expected date: 05/04/2020  -     Eucalyptus IgE; Future; Expected date: 05/04/2020  -     Esteban elder, rough IgE; Future; Expected date: 05/04/2020  -     Mugwort IgE; Future; Expected date: 05/04/2020  -     Nettle IgE; Future; Expected date: 05/04/2020  -     Orchard grass IgE; Future; Expected date: 05/04/2020  -     Sandoval, western white IgE; Future; Expected date: 05/04/2020  -     Ragweed, short, common IgE; Future; Expected date:  05/04/2020  -     Red top grass IgE; Future; Expected date: 05/04/2020  -     Rye grass, cultivated IgE; Future; Expected date: 05/04/2020  -     Thistle, Russian IgE; Future; Expected date: 05/04/2020  -     Stemphyllium IgE; Future; Expected date: 05/04/2020  -     Jesús IgE; Future; Expected date: 05/04/2020  -     Nick grass IgE; Future; Expected date: 05/04/2020  -     Allergen, Pecan Tree IgE; Future; Expected date: 05/04/2020  -     Gore, black IgE; Future; Expected date: 05/04/2020  -     [employer], bald IgE; Future; Expected date: 05/04/2020  -     Oak, white IgE; Future; Expected date: 05/04/2020  -     Allergen, Cocklebur; Future; Expected date: 05/04/2020  -     Cat epithelium IgE; Future; Expected date: 05/04/2020  -     cetirizine (ZYRTEC) 10 MG tablet; Take 1 tablet (10 mg total) by mouth once daily.  Dispense: 30 tablet; Refill: 5    Skin lesion  -     Ambulatory referral/consult to Dermatology; Future; Expected date: 05/11/2020    Abnormal lesion in the middle of the back. Dermatology referral placed.    Will contact with lab results, once they are available.  Offered an Epipen and she declined.  Suspect that her symptoms are related to her chemotherapy.  Will contact with lab results, once they become available.

## 2020-05-05 LAB
ANA SER QL IF: POSITIVE
ANA TITR SER IF: NORMAL {TITER}

## 2020-05-06 ENCOUNTER — PATIENT MESSAGE (OUTPATIENT)
Dept: ALLERGY | Facility: CLINIC | Age: 65
End: 2020-05-06

## 2020-05-06 ENCOUNTER — OFFICE VISIT (OUTPATIENT)
Dept: DERMATOLOGY | Facility: CLINIC | Age: 65
End: 2020-05-06
Payer: MEDICARE

## 2020-05-06 DIAGNOSIS — L91.0 KELOID: Primary | ICD-10-CM

## 2020-05-06 DIAGNOSIS — D23.9 DILATED PORE OF WINER: ICD-10-CM

## 2020-05-06 LAB
ANTI SM ANTIBODY: 0.85 RATIO (ref 0–0.99)
ANTI SM/RNP ANTIBODY: 0.65 RATIO (ref 0–0.99)
ANTI-SM INTERPRETATION: NEGATIVE
ANTI-SM/RNP INTERPRETATION: NEGATIVE
ANTI-SSA ANTIBODY: 0.05 RATIO (ref 0–0.99)
ANTI-SSA INTERPRETATION: NEGATIVE
ANTI-SSB ANTIBODY: 0.41 RATIO (ref 0–0.99)
ANTI-SSB INTERPRETATION: NEGATIVE
C1Q1 TESTING DATE: NORMAL
C1Q2 TESTING DATE: NORMAL
CLASS I ANTIBODIES - LUMINEX: NEGATIVE
CLASS II ANTIBODIES - LUMINEX: NEGATIVE
DSDNA AB SER-ACNC: NORMAL [IU]/ML
HC1Q TESTING DATE: NORMAL
SERUM COLLECTION DT - LUMINEX CLASS I: NORMAL
SERUM COLLECTION DT - LUMINEX CLASS II: NORMAL

## 2020-05-06 PROCEDURE — 99202 PR OFFICE/OUTPT VISIT, NEW, LEVL II, 15-29 MIN: ICD-10-PCS | Mod: S$PBB,,, | Performed by: STUDENT IN AN ORGANIZED HEALTH CARE EDUCATION/TRAINING PROGRAM

## 2020-05-06 PROCEDURE — 99999 PR PBB SHADOW E&M-EST. PATIENT-LVL II: CPT | Mod: PBBFAC,,, | Performed by: STUDENT IN AN ORGANIZED HEALTH CARE EDUCATION/TRAINING PROGRAM

## 2020-05-06 PROCEDURE — 99202 OFFICE O/P NEW SF 15 MIN: CPT | Mod: S$PBB,,, | Performed by: STUDENT IN AN ORGANIZED HEALTH CARE EDUCATION/TRAINING PROGRAM

## 2020-05-06 PROCEDURE — 99999 PR PBB SHADOW E&M-EST. PATIENT-LVL II: ICD-10-PCS | Mod: PBBFAC,,, | Performed by: STUDENT IN AN ORGANIZED HEALTH CARE EDUCATION/TRAINING PROGRAM

## 2020-05-06 PROCEDURE — 99212 OFFICE O/P EST SF 10 MIN: CPT | Mod: PBBFAC | Performed by: STUDENT IN AN ORGANIZED HEALTH CARE EDUCATION/TRAINING PROGRAM

## 2020-05-06 RX ORDER — BETAMETHASONE VALERATE 1.2 MG/G
CREAM TOPICAL 2 TIMES DAILY
Qty: 45 G | Refills: 1 | Status: SHIPPED | OUTPATIENT
Start: 2020-05-06 | End: 2020-09-22

## 2020-05-06 NOTE — LETTER
May 6, 2020      Bettye Chua MD  [address]  [place] [address]           [facility]  [address]  [place] [address]  Phone: [phone]  Fax: [phone]          Patient: Casie Gaines   MR Number: [id number]   YOB: 1955   Date of Visit: 5/6/2020       Dear Dr. Bettye Chua:    Thank you for referring Casie Gaines to me for evaluation. Attached you will find relevant portions of my assessment and plan of care.    If you have questions, please do not hesitate to call me. I look forward to following Casie Gaines along with you.    Sincerely,    Can Kearns MD    Enclosure  CC:  No Recipients    If you would like to receive this communication electronically, please contact [email or web address] or [phone] to request more information on "[employer] Link access.    For providers and/or their staff who would like to refer a patient to [facility], please contact us through our one-stop-shop provider referral line, [facility], at [phone].    If you feel you have received this communication in error or would no longer like to receive these types of communications, please e-mail [email or web address]

## 2020-05-06 NOTE — PROGRESS NOTES
Subjective:       Patient ID:  Casie Gaines is a 64 y.o. female who presents for   Chief Complaint   Patient presents with    Spot     spot of back and right hand      History of Present Illness: The patient presents with chief complaint of a skin lesion/mole.  Location: mid back  Duration: present for years, recently told to have lesion examined  Signs/Symptoms: none  Prior treatments: none        Review of Systems   Skin: Positive for itching (complains of having keloid scars on the abdomen that have been causing itching.). Negative for rash.        Objective:    Physical Exam   Constitutional: She appears well-developed and well-nourished. No distress.   Neurological: She is alert and oriented to person, place, and time. She is not disoriented.   Psychiatric: She has a normal mood and affect.   Skin:   Areas Examined (abnormalities noted in diagram):   Back Inspection Performed              Diagram Legend     Erythematous scaling macule/papule c/w actinic keratosis       Vascular papule c/w angioma      Pigmented verrucoid papule/plaque c/w seborrheic keratosis      Yellow umbilicated papule c/w sebaceous hyperplasia      Irregularly shaped tan macule c/w lentigo     1-2 mm smooth white papules consistent with Milia      Movable subcutaneous cyst with punctum c/w epidermal inclusion cyst      Subcutaneous movable cyst c/w pilar cyst      Firm pink to brown papule c/w dermatofibroma      Pedunculated fleshy papule(s) c/w skin tag(s)      Evenly pigmented macule c/w junctional nevus     Mildly variegated pigmented, slightly irregular-bordered macule c/w mildly atypical nevus      Flesh colored to evenly pigmented papule c/w intradermal nevus       Pink pearly papule/plaque c/w basal cell carcinoma      Erythematous hyperkeratotic cursted plaque c/w SCC      Surgical scar with no sign of skin cancer recurrence      Open and closed comedones      Inflammatory papules and pustules      Verrucoid papule  consistent consistent with wart     Erythematous eczematous patches and plaques     Dystrophic onycholytic nail with subungual debris c/w onychomycosis     Umbilicated papule    Erythematous-base heme-crusted tan verrucoid plaque consistent with inflamed seborrheic keratosis     Erythematous Silvery Scaling Plaque c/w Psoriasis     See annotation      Assessment / Plan:        Dilated pore of Barbara  Reassurance given to patient. No treatment is necessary.   Discussed treatment options - excision vs observation. Cysts may recur with excision. Pt will defer treatment at this time.       Keloid  -     betamethasone valerate 0.1% (VALISONE) 0.1 % Crea; Apply topically 2 (two) times daily.  Dispense: 45 g; Refill: 1         Follow up if symptoms worsen or fail to improve.

## 2020-05-07 ENCOUNTER — PATIENT MESSAGE (OUTPATIENT)
Dept: ALLERGY | Facility: CLINIC | Age: 65
End: 2020-05-07

## 2020-05-07 LAB
A FUMIGATUS IGE QN: <0.1 KU/L
ALLERGEN CHAETOMIUM GLOBOSUM IGE: <0.1 KU/L
ALLERGEN WHITE PINE TREE IGE: <0.1 KU/L
BAHIA GRASS IGE QN: <0.1 KU/L
BALD CYPRESS IGE QN: <0.1 KU/L
C HERBARUM IGE QN: <0.1 KU/L
C LUNATA IGE QN: <0.1 KU/L
CAT DANDER IGE QN: <0.1 KU/L
CHAETOMIUM GLOB. CLASS: NORMAL
COCKLEBUR IGE QN: <0.1 KU/L
COCKSFOOT IGE QN: <0.1 KU/L
COMMON RAGWEED IGE QN: <0.1 KU/L
D FARINAE IGE QN: <0.1 KU/L
D PTERONYSS IGE QN: <0.1 KU/L
DEPRECATED A FUMIGATUS IGE RAST QL: NORMAL
DEPRECATED BAHIA GRASS IGE RAST QL: NORMAL
DEPRECATED BALD CYPRESS IGE RAST QL: NORMAL
DEPRECATED C HERBARUM IGE RAST QL: NORMAL
DEPRECATED C LUNATA IGE RAST QL: NORMAL
DEPRECATED CAT DANDER IGE RAST QL: NORMAL
DEPRECATED COCKLEBUR IGE RAST QL: NORMAL
DEPRECATED COCKSFOOT IGE RAST QL: NORMAL
DEPRECATED COMMON RAGWEED IGE RAST QL: NORMAL
DEPRECATED D FARINAE IGE RAST QL: NORMAL
DEPRECATED D PTERONYSS IGE RAST QL: NORMAL
DEPRECATED DOG DANDER IGE RAST QL: ABNORMAL
DEPRECATED ELDER IGE RAST QL: NORMAL
DEPRECATED ENGL PLANTAIN IGE RAST QL: NORMAL
DEPRECATED GUM-TREE IGE RAST QL: NORMAL
DEPRECATED JOHNSON GRASS IGE RAST QL: NORMAL
DEPRECATED MUGWORT IGE RAST QL: NORMAL
DEPRECATED NETTLE IGE RAST QL: NORMAL
DEPRECATED PECAN/HICK TREE IGE RAST QL: NORMAL
DEPRECATED PER RYE GRASS IGE RAST QL: NORMAL
DEPRECATED RED TOP GRASS IGE RAST QL: NORMAL
DEPRECATED ROACH IGE RAST QL: NORMAL
DEPRECATED SALTWORT IGE RAST QL: NORMAL
DEPRECATED TIMOTHY IGE RAST QL: NORMAL
DEPRECATED WHITE OAK IGE RAST QL: NORMAL
DEPRECATED WILLOW IGE RAST QL: NORMAL
DOG DANDER IGE QN: 0.32 KU/L
ELDER IGE QN: <0.1 KU/L
ENGL PLANTAIN IGE QN: <0.1 KU/L
GUM-TREE IGE QN: <0.1 KU/L
IC SERPL C1Q BIND-ACNC: 3.6 UG EQ/ML (ref 0–3.9)
JOHNSON GRASS IGE QN: <0.1 KU/L
MUGWORT IGE QN: <0.1 KU/L
NETTLE IGE QN: <0.1 KU/L
PECAN/HICK TREE IGE QN: <0.1 KU/L
PER RYE GRASS IGE QN: <0.1 KU/L
RED TOP GRASS IGE QN: <0.1 KU/L
ROACH IGE QN: <0.1 KU/L
SALTWORT IGE QN: <0.1 KU/L
STEMPHYLIUM HERBARUM CLASS: NORMAL
STEMPHYLLIUM, IGE: <0.1 KU/L
TIMOTHY IGE QN: <0.1 KU/L
WHITE OAK IGE QN: <0.1 KU/L
WHITE PINE CLASS: NORMAL
WILLOW IGE QN: <0.1 KU/L

## 2020-05-08 ENCOUNTER — TELEPHONE (OUTPATIENT)
Dept: HEMATOLOGY/ONCOLOGY | Facility: CLINIC | Age: 65
End: 2020-05-08

## 2020-05-08 NOTE — TELEPHONE ENCOUNTER
----- Message from Nilsa Kebede sent at 5/8/2020  2:37 PM CDT -----  Type:  Needs Medical Advice    Who Called:  Pt  Casie  Symptoms (please be specific):  Pt is needing to reschedule her appts that are on 5-14-20   How long has patient had these symptoms:    Pharmacy name and phone #:    Would the patient rather a call back or a response via [facility]?  Call back  Best Call Back Number:  [phone]  Additional Information:  Please [email or web address]

## 2020-05-11 LAB
C1INH FUNCTIONAL/C1INH TOTAL MFR SERPL: 99 %
C1INH SERPL-MCNC: 39 MG/DL (ref 21–39)

## 2020-05-12 ENCOUNTER — PATIENT MESSAGE (OUTPATIENT)
Dept: INTERNAL MEDICINE | Facility: CLINIC | Age: 65
End: 2020-05-12

## 2020-05-12 LAB
CU INDEX: 1.2
CU, ANTI-THYROGLOBULIN IGG: <20 IU/ML
CU, ANTI-THYROID PEROXIDASE IGG: <10 IU/ML
CU, TSH (THYROTROPIN): 5.09 UIU/ML (ref 0.4–4)

## 2020-05-14 ENCOUNTER — PATIENT MESSAGE (OUTPATIENT)
Dept: ALLERGY | Facility: CLINIC | Age: 65
End: 2020-05-14

## 2020-05-18 DIAGNOSIS — C56.9 MALIGNANT NEOPLASM OF OVARY, UNSPECIFIED LATERALITY: ICD-10-CM

## 2020-05-18 RX ORDER — ROSUVASTATIN CALCIUM 10 MG/1
10 TABLET, COATED ORAL DAILY
Qty: 90 TABLET | Refills: 1 | Status: SHIPPED | OUTPATIENT
Start: 2020-05-18 | End: 2020-12-03 | Stop reason: SDUPTHER

## 2020-05-20 ENCOUNTER — OFFICE VISIT (OUTPATIENT)
Dept: GYNECOLOGIC ONCOLOGY | Facility: CLINIC | Age: 65
End: 2020-05-20
Payer: MEDICARE

## 2020-05-20 ENCOUNTER — CLINICAL SUPPORT (OUTPATIENT)
Dept: HEMATOLOGY/ONCOLOGY | Facility: CLINIC | Age: 65
End: 2020-05-20
Payer: MEDICARE

## 2020-05-20 VITALS
SYSTOLIC BLOOD PRESSURE: 136 MMHG | DIASTOLIC BLOOD PRESSURE: 77 MMHG | HEIGHT: 67 IN | WEIGHT: 261.25 LBS | HEART RATE: 92 BPM | BODY MASS INDEX: 41 KG/M2

## 2020-05-20 DIAGNOSIS — R60.0 LOWER EXTREMITY EDEMA: Primary | ICD-10-CM

## 2020-05-20 DIAGNOSIS — C56.3 OVARIAN CANCER, BILATERAL: ICD-10-CM

## 2020-05-20 PROCEDURE — 99214 OFFICE O/P EST MOD 30 MIN: CPT | Mod: S$PBB,,, | Performed by: OBSTETRICS & GYNECOLOGY

## 2020-05-20 PROCEDURE — 99999 PR PBB SHADOW E&M-EST. PATIENT-LVL III: ICD-10-PCS | Mod: PBBFAC,,, | Performed by: OBSTETRICS & GYNECOLOGY

## 2020-05-20 PROCEDURE — 99214 PR OFFICE/OUTPT VISIT, EST, LEVL IV, 30-39 MIN: ICD-10-PCS | Mod: S$PBB,,, | Performed by: OBSTETRICS & GYNECOLOGY

## 2020-05-20 PROCEDURE — 99999 PR PBB SHADOW E&M-EST. PATIENT-LVL III: CPT | Mod: PBBFAC,,, | Performed by: OBSTETRICS & GYNECOLOGY

## 2020-05-20 PROCEDURE — 99213 OFFICE O/P EST LOW 20 MIN: CPT | Mod: PBBFAC | Performed by: OBSTETRICS & GYNECOLOGY

## 2020-05-20 PROCEDURE — U0003 INFECTIOUS AGENT DETECTION BY NUCLEIC ACID (DNA OR RNA); SEVERE ACUTE RESPIRATORY SYNDROME CORONAVIRUS 2 (SARS-COV-2) (CORONAVIRUS DISEASE [COVID-19]), AMPLIFIED PROBE TECHNIQUE, MAKING USE OF HIGH THROUGHPUT TECHNOLOGIES AS DESCRIBED BY CMS-2020-01-R: HCPCS

## 2020-05-20 NOTE — Clinical Note
Hi all, I agree with Dr. Mckeon that avastin is unlikely to be the cause.  I worry sometimes about nephrotic syndrome with her proteinuria, but I would expect the edema would be more global.Dr. Chua, do we have any idea if this could be related to the Avastin?Also Dr. Mckeon, do you think we should maybe wait for it to resolve it before continuing the Avastin?  She denies any new exposures and says it started around cycle #5 and gets worse with treatment?  I also ordered LE Dopplers on her.  Feel free to chat by phone if easier: [phone]

## 2020-05-21 ENCOUNTER — TELEPHONE (OUTPATIENT)
Dept: GYNECOLOGIC ONCOLOGY | Facility: CLINIC | Age: 65
End: 2020-05-21

## 2020-05-21 LAB — SARS-COV-2 RNA RESP QL NAA+PROBE: NOT DETECTED

## 2020-05-21 NOTE — TELEPHONE ENCOUNTER
----- Message from Wilner Green MD sent at 5/20/2020 10:24 PM CDT -----  Jose R,    This is a BR patient.  Can you please help schedule LE dopplers for her?  I placed the order.  Thank you so much.

## 2020-05-21 NOTE — PROGRESS NOTES
.  REFERRING PROVIDER  No ref. provider found     REASON FOR CONSULT  Casie Gaines  is a 64 y.o.  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  The interval history is as follows:  Patient is tolerating p.o. without any nausea vomiting.  She is having regular bowel movements.  She denies any vaginal bleeding or vaginal discharge. She denies any abdominal pain, bloating, or early satiety.  She does complain of upper and lower extremity edema.  It is intermittent.  4/30/20 Doppler was negative.  It is more pronounced in her R LE.  No erythema or tenderness.  It's not associated with CP or SOA.  She completed a course of steroids and is using topical Benadryl PRN.    Tumor History:  1. 1/26/129: CT C/A/P: Pleural effusion, bilateral adnexal masses, lymphadenopathy, and omental caking  2. 1/27/19: CT guided omental biopsy  Right ureteral obstruction with indwelling ureteral stent  4. 2/28-6/18/19: T/C/A x6  5. 8/19/19: Robotic BSO/Right radical retroperiotneal dissection and ureterolysis/omentectomy. Complete pathologic response  6. 10/8/19-: Avastin maintenance.       REVIEW OF SYSTEMS  All systems reviewed and negative except as noted in HPI.    OBJECTIVE   Vitals:    05/20/20 1318   BP: 136/77   Pulse: 92      Body mass index is 40.92 kg/m².      1. General: Well appearing, no apparent distress, alert and oriented.  2. Lymph: Neck symmetric without cervical or supraclavicular adenopathy or mass.  3. Lungs: Normal respiratory rate, no accessory muscle use.  4. Cardiac: Normal rate  5. Psych: Normal affect.  6. Abdomen:  non-distended, soft, non-tender, are no masses, no ascites, no hepatosplenomegaly.  7. Skin: Warm, dry, no rashes or lesions.   8. Extremities: Bilateral lower extremities without edema or tenderness.  9. Genitourinary                Pelvic Examination including:                a. External genitalia are normal in appearance. No lesions noted.               b. Urethral meatus is normal size, location, and appearance.               c. Urethra is negative.               d. Bladder is nontender. No masses noted.               e. Vagina has normal mucosa with physiologic discharge. No lesions noted.              f. Uterus absent              g. Adnexa absent   h. Rectum deferred    ECOG status: 1    LABORATORY DATA  Lab data reviewed.    RADIOLOGICAL DATA  Radiology data reviewed.    ASSESSMENT / PLAN     1. Lower extremity edema       -CLYDE  - every 3 months.    -LE dopplers to rule out DVT  -I agree with Dr. Mckeon that the avastin is likely not the culprit of her symptoms.  She has been seen by Allergy.  I think until it resolves or we find a culprit, it would be worthwhile to hold the Avastin.  I will discuss with Dr. Mckeon.    PATIENT EDUCATION  Ready to learn, no apparent learning barriers were identified; learning preferences include listening. Explained diagnosis and treatment plan; patient expressed understanding of the content.    ADMINISTRATIVE BILLING  This consultation lasted 30 minutes and greater than 50% of was spent in counseling.       Wilner Green

## 2020-05-21 NOTE — TELEPHONE ENCOUNTER
Spoke with our patient about her schedule appointment Monday 24th  she agreed she voiced understanding of the date, time and location. All questions answered appointment mail. MA/VEL /Preceptor Tomasz VALENCIA

## 2020-05-25 ENCOUNTER — HOSPITAL ENCOUNTER (OUTPATIENT)
Dept: RADIOLOGY | Facility: HOSPITAL | Age: 65
Discharge: HOME | End: 2020-05-25
Attending: OBSTETRICS & GYNECOLOGY
Payer: MEDICARE

## 2020-05-25 ENCOUNTER — OFFICE VISIT (OUTPATIENT)
Dept: HEMATOLOGY/ONCOLOGY | Facility: CLINIC | Age: 65
End: 2020-05-25
Payer: MEDICARE

## 2020-05-25 DIAGNOSIS — T78.3XXA ANGIOEDEMA, INITIAL ENCOUNTER: ICD-10-CM

## 2020-05-25 DIAGNOSIS — C78.6 PERITONEAL CARCINOMATOSIS: Primary | ICD-10-CM

## 2020-05-25 DIAGNOSIS — R60.0 LOWER EXTREMITY EDEMA: ICD-10-CM

## 2020-05-25 DIAGNOSIS — C56.3 MALIGNANT NEOPLASM OF BOTH OVARIES: ICD-10-CM

## 2020-05-25 PROCEDURE — 99215 PR OFFICE/OUTPT VISIT, EST, LEVL V, 40-54 MIN: ICD-10-PCS | Mod: 95,,, | Performed by: INTERNAL MEDICINE

## 2020-05-25 PROCEDURE — 99215 OFFICE O/P EST HI 40 MIN: CPT | Mod: 95,,, | Performed by: INTERNAL MEDICINE

## 2020-05-25 PROCEDURE — 93970 EXTREMITY STUDY: CPT | Mod: TC

## 2020-05-25 RX ORDER — EPINEPHRINE 0.3 MG/.3ML
1 INJECTION SUBCUTANEOUS ONCE
Qty: 2 EACH | Refills: 0 | Status: SHIPPED | OUTPATIENT
Start: 2020-05-25 | End: 2020-06-21 | Stop reason: SDUPTHER

## 2020-05-25 NOTE — PROGRESS NOTES
Subjective:       Patient ID: Casie Gaines is a 64 y.o. female.    Chief Complaint: Peritoneal carcinomatosis [C78.6, C80.1]  HPI: We have an opportunity to see Ms. Casie Gaines in Hematology Oncology clinic at [facility] on 05/24/2020.  Ms. Casie Gaines is a 64 y.o. woman with peritoneal carcinomatosis with malignant right pleural effusion.  Final pathology is consistent with ovarian primary with  2740.  PET scan demonstrates multiple omental and peritoneal avid masses consistent with peritoneal carcinomatosis, extensive retroperitoneal and mediastinal lymphadenopathy, masslike structure in the right iliac fossa likely representing ovarian mass.  Patient has been evaluated by GYN Oncology Dr. Juarez and MD Green with recommendations for him carboplatin/Taxol/Avastin.      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  S/p 6 cycle avastin taxol carboplatin.  Has great FL after 6 cycles. On 8/15/2019 underwent salpingoophorectomy. Pathology showed CR. Currently on maintenance avastin.       Peritoneal carcinomatosis    1/26/2019 Initial Diagnosis     Peritoneal carcinomatosis      2/15/2019 - 7/8/2019 Chemotherapy     Treatment Summary   Plan Name: OP GYN PACLITAXEL CARBOPLATIN (AUC 6) Q3W  Treatment Goal: Palliative  Status: Inactive  Start Date: 2/28/2019  End Date: 6/18/2019  Provider: Will Trevizo Jr., MD  Chemotherapy: bevacizumab (AVASTIN) 15 mg/kg = 1,600 mg in sodium chloride 0.9% 100 mL chemo infusion, 15 mg/kg = 1,600 mg (100 % of original dose 15 mg/kg), Intravenous, Clinic/[facility] 1 time, 6 of 6 cycles  Dose modification: 15 mg/kg (original dose 15 mg/kg, Cycle 1)  Administration: 1,600 mg (2/28/2019), 1,600 mg (3/21/2019), 1,600 mg (4/11/2019), 1,600 mg (5/7/2019), 1,600 mg (5/28/2019), 1,510 mg (6/18/2019)  CARBOplatin (PARAPLATIN) 870 mg in sodium chloride 0.9% 337 mL chemo infusion, 870 mg (100 % of original dose 868.8 mg), Intravenous,  Clinic/HOD 1 time, 6 of 6 cycles  Dose modification:   (original dose 868.8 mg, Cycle 1)  Administration: 870 mg (2/28/2019), 870 mg (3/21/2019), 900 mg (4/11/2019), 870 mg (5/7/2019), 660 mg (5/28/2019), 690 mg (6/18/2019)  PACLitaxel (TAXOL) 175 mg/m2 = 396 mg in sodium chloride 0.9% 566 mL chemo infusion, 175 mg/m2 = 396 mg, Intravenous, Clinic/[facility] 1 time, 6 of 6 cycles  Dose modification: 140 mg/m2 (80 % of original dose 175 mg/m2, Cycle 5)  Administration: 396 mg (2/28/2019), 396 mg (3/21/2019), 396 mg (4/11/2019), 396 mg (5/7/2019), 318 mg (5/28/2019), 306 mg (6/18/2019)      10/8/2019 -  Chemotherapy     Treatment Summary   Plan Name: OP BEVACIZUMAB Q3W   Treatment Goal: Maintenance  Status: Active  Start Date: 10/9/2019  End Date: 12/21/2020 (Planned)  Provider: Oscar Mckeon MD  Chemotherapy: bevacizumab (AVASTIN) 15 mg/kg = 1,615 mg in sodium chloride 0.9% 100 mL chemo infusion, 15 mg/kg = 1,615 mg, Intravenous, Clinic/H[facility] 1 time, 6 of 17 cycles  Administration: 1,615 mg (10/9/2019), 1,615 mg (10/29/2019), 1,615 mg (12/10/2019), 1,615 mg (1/21/2020), 1,600 mg (4/2/2020), 1,615 mg (4/23/2020)        Malignant neoplasm of right ovary    2/15/2019 Initial Diagnosis     Malignant neoplasm of right ovary      2/15/2019 - 7/8/2019 Chemotherapy     Treatment Summary   Plan Name: OP GYN PACLITAXEL CARBOPLATIN (AUC 6) Q3W  Treatment Goal: Palliative  Status: Inactive  Start Date: 2/28/2019  End Date: 6/18/2019  Provider: Will Trevizo Jr., MD  Chemotherapy: bevacizumab (AVASTIN) 15 mg/kg = 1,600 mg in sodium chloride 0.9% 100 mL chemo infusion, 15 mg/kg = 1,600 mg (100 % of original dose 15 mg/kg), Intravenous, Clinic/[facility] 1 time, 6 of 6 cycles  Dose modification: 15 mg/kg (original dose 15 mg/kg, Cycle 1)  Administration: 1,600 mg (2/28/2019), 1,600 mg (3/21/2019), 1,600 mg (4/11/2019), 1,600 mg (5/7/2019), 1,600 mg (5/28/2019), 1,510 mg (6/18/2019)  CARBOplatin (PARAPLATIN) 870 mg in sodium chloride 0.9% 337 mL  chemo infusion, 870 mg (100 % of original dose 868.8 mg), Intravenous, Clinic/[facility] 1 time, 6 of 6 cycles  Dose modification:   (original dose 868.8 mg, Cycle 1)  Administration: 870 mg (2/28/2019), 870 mg (3/21/2019), 900 mg (4/11/2019), 870 mg (5/7/2019), 660 mg (5/28/2019), 690 mg (6/18/2019)  PACLitaxel (TAXOL) 175 mg/m2 = 396 mg in sodium chloride 0.9% 566 mL chemo infusion, 175 mg/m2 = 396 mg, Intravenous, Clinic/HOD 1 time, 6 of 6 cycles  Dose modification: 140 mg/m2 (80 % of original dose 175 mg/m2, Cycle 5)  Administration: 396 mg (2/28/2019), 396 mg (3/21/2019), 396 mg (4/11/2019), 396 mg (5/7/2019), 318 mg (5/28/2019), 306 mg (6/18/2019)      10/8/2019 -  Chemotherapy     Treatment Summary   Plan Name: OP BEVACIZUMAB Q3W   Treatment Goal: Maintenance  Status: Active  Start Date: 10/9/2019  End Date: 12/21/2020 (Planned)  Provider: Oscar Mckeon MD  Chemotherapy: bevacizumab (AVASTIN) 15 mg/kg = 1,615 mg in sodium chloride 0.9% 100 mL chemo infusion, 15 mg/kg = 1,615 mg, Intravenous, Clinic/HOD 1 time, 6 of 17 cycles  Administration: 1,615 mg (10/9/2019), 1,615 mg (10/29/2019), 1,615 mg (12/10/2019), 1,615 mg (1/21/2020), 1,600 mg (4/2/2020), 1,615 mg (4/23/2020)        Malignant neoplasm of both ovaries    2/18/2019 Initial Diagnosis     Ovarian cancer      10/8/2019 -  Chemotherapy     Treatment Summary   Plan Name: OP BEVACIZUMAB Q3W   Treatment Goal: Maintenance  Status: Active  Start Date: 10/9/2019  End Date: 12/21/2020 (Planned)  Provider: Oscar Mckeon MD  Chemotherapy: bevacizumab (AVASTIN) 15 mg/kg = 1,615 mg in sodium chloride 0.9% 100 mL chemo infusion, 15 mg/kg = 1,615 mg, Intravenous, Clinic/[facility] 1 time, 6 of 17 cycles  Administration: 1,615 mg (10/9/2019), 1,615 mg (10/29/2019), 1,615 mg (12/10/2019), 1,615 mg (1/21/2020), 1,600 mg (4/2/2020), 1,615 mg (4/23/2020)        Ovarian cancer, bilateral    8/15/2019 Initial Diagnosis     Ovarian cancer, bilateral      10/8/2019 -  Chemotherapy      Treatment Summary   Plan Name: OP BEVACIZUMAB Q3W   Treatment Goal: Maintenance  Status: Active  Start Date: 10/9/2019  End Date: 2020 (Planned)  Provider: Oscar Mckeon MD  Chemotherapy: bevacizumab (AVASTIN) 15 mg/kg = 1,615 mg in sodium chloride 0.9% 100 mL chemo infusion, 15 mg/kg = 1,615 mg, Intravenous, Clinic/H[facility] 1 time, 6 of 17 cycles  Administration: 1,615 mg (10/9/2019), 1,615 mg (10/29/2019), 1,615 mg (12/10/2019), 1,615 mg (2020), 1,600 mg (2020), 1,615 mg (2020)       Past Medical History:   Diagnosis Date    Anemia     Anxiety     Arthritis     knees    Cancer     ovarian    CHF (congestive heart failure)     Hx antineoplastic chemotherapy     last 2019    Hyperlipemia     Hypertension     Neck pain     Ovarian cancer 2019    CHEMO     Family History   Problem Relation Age of Onset    Anesthesia problems Other     Breast cancer Maternal Aunt     Breast cancer Paternal Aunt     Ovarian cancer Paternal Aunt     Colon cancer Brother     Thrombophilia Neg Hx      Social History     Socioeconomic History    Marital status:      Spouse name: Not on file    Number of children: Not on file    Years of education: Not on file    Highest education level: Not on file   Occupational History    Not on file   Social Needs    Financial resource strain: Not on file    Food insecurity:     Worry: Not on file     Inability: Not on file    Transportation needs:     Medical: Not on file     Non-medical: Not on file   Tobacco Use    Smoking status: Former Smoker     Packs/day: 1.00     Years: 25.00     Pack years: 25.00     Last attempt to quit: 10/1/2018     Years since quittin.6    Smokeless tobacco: Never Used    Tobacco comment: States started quit 2 months ago after 30 years   Substance and Sexual Activity    Alcohol use: Never     Alcohol/week: 0.0 standard drinks     Frequency: Never     Comment: occasionally  No alcohol 72h prior to sx    Drug use: No     Sexual activity: Not Currently     Partners: Male     Comment: hyst; mut monog   Lifestyle    Physical activity:     Days per week: Not on file     Minutes per session: Not on file    Stress: Only a little   Relationships    Social connections:     Talks on phone: Not on file     Gets together: Not on file     Attends [employer] service: Not on file     Active member of club or organization: Not on file     Attends meetings of clubs or organizations: Not on file     Relationship status: Not on file   Other Topics Concern    Not on file   Social History Narrative    Live w/ spouse and 2 dogs      Past Surgical History:   Procedure Laterality Date    breast reduction  10/02/2018     SECTION      X 1    CYSTOSCOPY W/ URETERAL STENT PLACEMENT Right 2019    Procedure: CYSTOSCOPY, WITH URETERAL STENT INSERTION;  Surgeon: Timmy Santiago IV, MD;  Location: [facility];  Service: Urology;  Laterality: Right;    CYSTOSCOPY W/ URETERAL STENT REMOVAL  10/04/2019    DILATION AND CURETTAGE OF UTERUS      HYSTERECTOMY      RALH for fibroids (still has ovaries)    INSERTION OF VENOUS ACCESS PORT Left 2019    Procedure: INSERTION, VENOUS ACCESS PORT;  Surgeon: Ulisses Monzon MD;  Location: [facility] OR;  Service: General;  Laterality: Left;  Left internal jugular     LYSIS OF ADHESIONS OF URETER N/A 8/15/2019    Procedure: URETEROLYSIS;  Surgeon: Ismael Juarez MD;  Location: [facility] OR;  Service: OB/GYN;  Laterality: N/A;    OMENTECTOMY N/A 8/15/2019    Procedure: OMENTECTOMY;  Surgeon: Ismael Juarez MD;  Location: [facility] OR;  Service: OB/GYN;  Laterality: N/A;    RETROGRADE PYELOGRAPHY Right 2019    Procedure: PYELOGRAM, RETROGRADE;  Surgeon: Timmy Santiago IV, MD;  Location: [facility];  Service: Urology;  Laterality: Right;    ROBOT-ASSISTED LAPAROSCOPIC SALPINGO-OOPHORECTOMY USING DA TIA XI Bilateral 8/15/2019    Procedure: XI ROBOTIC SALPINGO-OOPHORECTOMY;  Surgeon: Ismael Juarez MD;  Location:  [facility] OR;  Service: OB/GYN;  Laterality: Bilateral;    TOTAL REDUCTION MAMMOPLASTY  2018    TUBAL LIGATION       Current Outpatient Medications   Medication Sig Dispense Refill    albuterol 90 mcg/actuation inhaler Inhale 2 puffs into the lungs every 4 (four) hours as needed for Wheezing. Rescue 18 g 0    ALPRAZolam (XANAX) 0.25 MG tablet Take 1 tablet (0.25 mg total) by mouth 3 (three) times daily as needed for Anxiety. 60 tablet 0    amLODIPine (NORVASC) 5 MG tablet Take 2 tablets (10 mg total) by mouth once daily. 60 tablet 11    betamethasone valerate 0.1% (VALISONE) 0.1 % Crea Apply topically 2 (two) times daily. 45 g 1    biotin 1 mg tablet Take 1,000 mcg by mouth once daily.       cetirizine (ZYRTEC) 10 MG tablet Take 1 tablet (10 mg total) by mouth once daily. 30 tablet 5    diclofenac sodium (VOLTAREN) 1 % Gel Apply once a day to back as needed 100 g 1    diphenhydrAMINE-zinc acetate 1-0.1% (BENADRYL ITCH STOPPING) cream Apply topically 2 (two) times daily as needed for Itching. 28 g 0    gabapentin (NEURONTIN) 100 MG capsule Take 1 capsule (100 mg total) by mouth 3 (three) times daily. 90 capsule 11    HYDROcodone-acetaminophen (NORCO) 5-325 mg per tablet Take 1 tablet by mouth every 6 (six) hours as needed. 20 tablet 0    multivitamin with minerals tablet Take 1 tablet by mouth once daily.       olmesartan-hydrochlorothiazide (BENICAR HCT) 40-12.5 mg Tab Take 1 tablet by mouth once daily. 90 tablet 0    oxyCODONE-acetaminophen (PERCOCET)  mg per tablet Take 1 tablet by mouth every 8 (eight) hours as needed. 20 tablet 0    rosuvastatin (CRESTOR) 10 MG tablet Take 1 tablet (10 mg total) by mouth once daily. 90 tablet 1    sertraline (ZOLOFT) 25 MG tablet Take 1 tablet (25 mg total) by mouth once daily. 30 tablet 11    tolterodine (DETROL) 2 MG Tab Take 1 tablet (2 mg total) by mouth 2 (two) times daily. 60 tablet 11    triamcinolone acetonide 0.1% (KENALOG) 0.1 % cream Apply  topically 2 (two) times daily. 15 g 0    zinc gluconate 50 mg tablet Take 50 mg by mouth once daily.       No current facility-administered medications for this visit.        Labs:  Lab Results   Component Value Date    WBC 8.12 04/23/2020    HGB 12.1 04/23/2020    HCT 39.6 04/23/2020    MCV 92 04/23/2020     04/23/2020     BMP  Lab Results   Component Value Date     04/23/2020    K 4.6 04/23/2020     04/23/2020    CO2 28 04/23/2020    BUN 9 04/23/2020    CREATININE 0.8 04/23/2020    CALCIUM 9.1 04/23/2020    ANIONGAP 9 04/23/2020    ESTGFRAFRICA >60 04/23/2020    EGFRNONAA >60 04/23/2020     Lab Results   Component Value Date    ALT 27 04/23/2020    AST 25 04/23/2020    ALKPHOS 106 04/23/2020    BILITOT 0.4 04/23/2020       No results found for: IRON, TIBC, FERRITIN, SATURATEDIRO  No results found for: [id number]  No results found for: FOLATE  Lab Results   Component Value Date    TSH 1.869 04/19/2013       I have reviewed the radiology reports and examined the scan/xray images.    Review of Systems   Constitutional: Negative.    HENT: Negative.    Eyes: Negative.    Respiratory: Negative.    Cardiovascular: Negative.    Gastrointestinal: Negative.    Endocrine: Negative.    Genitourinary: Negative.    Musculoskeletal: Negative.    Skin: Negative.    Allergic/Immunologic: Negative.    Neurological: Negative.    Hematological: Negative.    Psychiatric/Behavioral: Negative.      ECOG SCORE    0 - Fully active-able to carry on all pre-disease performance without restriction            Objective:   There were no vitals filed for this visit.There is no height or weight on file to calculate BMI.  Physical Exam      Assessment:      1. Peritoneal carcinomatosis    2. Malignant neoplasm of both ovaries    3. Angioedema, initial encounter           Plan:     Peritoneal carcinomatosis    Malignant neoplasm of both ovaries  Will get restaging PET CT in 3 weeks.  Consider resume maintenance Avastin if edema  resolves, versus a parp1 inhibitor.  -     EPINEPHrine (EPIPEN) 0.3 mg/0.3 mL AtIn; Inject 0.3 mLs (0.3 mg total) into the muscle once. for 1 dose  Dispense: 0.3 mL; Refill: 0  -     NM PET CT Routine Skull to Mid Thigh; Future; Expected date: 06/17/2020    Angioedema, initial encounter  Had edema of arm, workup negative for DVT, now has edema of leg, US legs negative for DVT  Unclear of edema is due to avastin.  -     EPINEPHrine (EPIPEN) 0.3 mg/0.3 mL AtIn; Inject 0.3 mLs (0.3 mg total) into the muscle once. for 1 dose  Dispense: 0.3 mL; Refill: 0

## 2020-06-11 ENCOUNTER — TELEPHONE (OUTPATIENT)
Dept: RADIOLOGY | Facility: HOSPITAL | Age: 65
End: 2020-06-11

## 2020-06-11 ENCOUNTER — TELEPHONE (OUTPATIENT)
Dept: SURGERY | Facility: CLINIC | Age: 65
End: 2020-06-11

## 2020-06-12 ENCOUNTER — TELEPHONE (OUTPATIENT)
Dept: RADIOLOGY | Facility: HOSPITAL | Age: 65
End: 2020-06-12

## 2020-06-15 ENCOUNTER — HOSPITAL ENCOUNTER (OUTPATIENT)
Dept: RADIOLOGY | Facility: HOSPITAL | Age: 65
Discharge: HOME | End: 2020-06-15
Attending: INTERNAL MEDICINE
Payer: MEDICARE

## 2020-06-15 DIAGNOSIS — C56.3 MALIGNANT NEOPLASM OF BOTH OVARIES: ICD-10-CM

## 2020-06-15 PROCEDURE — A9552 F18 FDG: HCPCS

## 2020-06-15 PROCEDURE — 78815 NM PET CT ROUTINE: ICD-10-PCS | Mod: 26,PS,, | Performed by: RADIOLOGY

## 2020-06-15 PROCEDURE — 78815 PET IMAGE W/CT SKULL-THIGH: CPT | Mod: TC

## 2020-06-15 PROCEDURE — 78815 PET IMAGE W/CT SKULL-THIGH: CPT | Mod: 26,PS,, | Performed by: RADIOLOGY

## 2020-06-21 DIAGNOSIS — C56.3 MALIGNANT NEOPLASM OF BOTH OVARIES: ICD-10-CM

## 2020-06-21 DIAGNOSIS — T78.3XXA ANGIOEDEMA, INITIAL ENCOUNTER: ICD-10-CM

## 2020-06-21 DIAGNOSIS — I10 ESSENTIAL HYPERTENSION: ICD-10-CM

## 2020-06-22 RX ORDER — EPINEPHRINE 0.3 MG/.3ML
1 INJECTION SUBCUTANEOUS ONCE
Qty: 2 EACH | Refills: 0 | Status: SHIPPED | OUTPATIENT
Start: 2020-06-22 | End: 2023-12-13

## 2020-06-22 RX ORDER — OLMESARTAN MEDOXOMIL AND HYDROCHLOROTHIAZIDE 40/12.5 40; 12.5 MG/1; MG/1
1 TABLET ORAL DAILY
Qty: 90 TABLET | Refills: 0 | Status: SHIPPED | OUTPATIENT
Start: 2020-06-22 | End: 2020-09-19 | Stop reason: SDUPTHER

## 2020-06-29 ENCOUNTER — LAB VISIT (OUTPATIENT)
Dept: LAB | Facility: HOSPITAL | Age: 65
End: 2020-06-29
Attending: INTERNAL MEDICINE
Payer: MEDICARE

## 2020-06-29 ENCOUNTER — OFFICE VISIT (OUTPATIENT)
Dept: HEMATOLOGY/ONCOLOGY | Facility: CLINIC | Age: 65
End: 2020-06-29
Payer: MEDICARE

## 2020-06-29 VITALS
HEART RATE: 82 BPM | DIASTOLIC BLOOD PRESSURE: 69 MMHG | RESPIRATION RATE: 18 BRPM | HEIGHT: 67 IN | BODY MASS INDEX: 39.35 KG/M2 | TEMPERATURE: 98 F | OXYGEN SATURATION: 98 % | WEIGHT: 250.69 LBS | SYSTOLIC BLOOD PRESSURE: 115 MMHG

## 2020-06-29 DIAGNOSIS — Z51.11 ENCOUNTER FOR ANTINEOPLASTIC CHEMOTHERAPY: ICD-10-CM

## 2020-06-29 DIAGNOSIS — C56.9 MALIGNANT NEOPLASM OF OVARY, UNSPECIFIED LATERALITY: ICD-10-CM

## 2020-06-29 DIAGNOSIS — R97.1 ELEVATED CANCER ANTIGEN 125 (CA 125): ICD-10-CM

## 2020-06-29 DIAGNOSIS — C78.6 PERITONEAL CARCINOMATOSIS: Primary | ICD-10-CM

## 2020-06-29 DIAGNOSIS — N06.1 ISOLATED PROTEINURIA WITH FOCAL AND SEGMENTAL GLOMERULAR LESIONS: ICD-10-CM

## 2020-06-29 DIAGNOSIS — E66.01 MORBID OBESITY: ICD-10-CM

## 2020-06-29 DIAGNOSIS — C78.6 PERITONEAL CARCINOMATOSIS: ICD-10-CM

## 2020-06-29 LAB
BACTERIA #/AREA URNS HPF: NORMAL /HPF
BILIRUB UR QL STRIP: NEGATIVE
CLARITY UR: CLEAR
COLOR UR: YELLOW
GLUCOSE UR QL STRIP: NEGATIVE
HGB UR QL STRIP: NEGATIVE
HYALINE CASTS #/AREA URNS LPF: 0 /LPF
KETONES UR QL STRIP: NEGATIVE
LEUKOCYTE ESTERASE UR QL STRIP: NEGATIVE
MICROSCOPIC COMMENT: NORMAL
NITRITE UR QL STRIP: NEGATIVE
PH UR STRIP: 6 [PH] (ref 5–8)
PROT UR QL STRIP: ABNORMAL
RBC #/AREA URNS HPF: 0 /HPF (ref 0–4)
SP GR UR STRIP: >=1.03 (ref 1–1.03)
SQUAMOUS #/AREA URNS HPF: 3 /HPF
URN SPEC COLLECT METH UR: ABNORMAL
UROBILINOGEN UR STRIP-ACNC: 1 EU/DL
WBC #/AREA URNS HPF: 0 /HPF (ref 0–5)

## 2020-06-29 PROCEDURE — 99215 OFFICE O/P EST HI 40 MIN: CPT | Mod: 25,S$PBB,, | Performed by: INTERNAL MEDICINE

## 2020-06-29 PROCEDURE — 99214 OFFICE O/P EST MOD 30 MIN: CPT | Mod: PBBFAC | Performed by: INTERNAL MEDICINE

## 2020-06-29 PROCEDURE — 99215 PR OFFICE/OUTPT VISIT, EST, LEVL V, 40-54 MIN: ICD-10-PCS | Mod: 25,S$PBB,, | Performed by: INTERNAL MEDICINE

## 2020-06-29 PROCEDURE — 99999 PR PBB SHADOW E&M-EST. PATIENT-LVL IV: ICD-10-PCS | Mod: PBBFAC,,, | Performed by: INTERNAL MEDICINE

## 2020-06-29 PROCEDURE — 81000 URINALYSIS NONAUTO W/SCOPE: CPT

## 2020-06-29 PROCEDURE — 99999 PR PBB SHADOW E&M-EST. PATIENT-LVL IV: CPT | Mod: PBBFAC,,, | Performed by: INTERNAL MEDICINE

## 2020-06-29 RX ORDER — DIPHENHYDRAMINE HYDROCHLORIDE 50 MG/ML
25 INJECTION INTRAMUSCULAR; INTRAVENOUS EVERY 10 MIN PRN
Status: CANCELLED | OUTPATIENT
Start: 2020-07-01

## 2020-06-29 RX ORDER — EPINEPHRINE 0.3 MG/.3ML
0.3 INJECTION SUBCUTANEOUS ONCE AS NEEDED
Status: CANCELLED | OUTPATIENT
Start: 2020-07-01

## 2020-06-29 RX ORDER — METHYLPREDNISOLONE SOD SUCC 125 MG
125 VIAL (EA) INJECTION ONCE AS NEEDED
Status: CANCELLED | OUTPATIENT
Start: 2020-07-01

## 2020-06-29 RX ORDER — ALPRAZOLAM 0.25 MG/1
0.25 TABLET ORAL 3 TIMES DAILY PRN
Qty: 60 TABLET | Refills: 2 | Status: SHIPPED | OUTPATIENT
Start: 2020-06-29 | End: 2020-09-24 | Stop reason: SDUPTHER

## 2020-06-29 RX ORDER — HEPARIN 100 UNIT/ML
500 SYRINGE INTRAVENOUS
Status: CANCELLED | OUTPATIENT
Start: 2020-07-01

## 2020-06-29 RX ORDER — SODIUM CHLORIDE 0.9 % (FLUSH) 0.9 %
10 SYRINGE (ML) INJECTION
Status: CANCELLED | OUTPATIENT
Start: 2020-07-01

## 2020-06-29 NOTE — PROGRESS NOTES
Subjective:       Patient ID: Casie Gaines is a 64 y.o. female.    Chief Complaint: Results and Chemotherapy    HPI 64-year-old female receiving maintenance Avastin for peritoneal carcinomatosis.  Patient had requested that her dipstick urine be read before receiving Avastin.  Reviewed results of recent PET scan ECOG status 1    Past Medical History:   Diagnosis Date    Anemia     Anxiety     Arthritis     knees    Cancer     ovarian    CHF (congestive heart failure)     Hx antineoplastic chemotherapy     last 2019    Hyperlipemia     Hypertension     Neck pain     Ovarian cancer 2019    CHEMO     Family History   Problem Relation Age of Onset    Anesthesia problems Other     Breast cancer Maternal Aunt     Breast cancer Paternal Aunt     Ovarian cancer Paternal Aunt     Colon cancer Brother     Thrombophilia Neg Hx      Social History     Socioeconomic History    Marital status:      Spouse name: Not on file    Number of children: Not on file    Years of education: Not on file    Highest education level: Not on file   Occupational History    Not on file   Social Needs    Financial resource strain: Not on file    Food insecurity     Worry: Not on file     Inability: Not on file    Transportation needs     Medical: Not on file     Non-medical: Not on file   Tobacco Use    Smoking status: Former Smoker     Packs/day: 1.00     Years: 25.00     Pack years: 25.00     Quit date: 10/1/2018     Years since quittin.7    Smokeless tobacco: Never Used    Tobacco comment: States started quit 2 months ago after 30 years   Substance and Sexual Activity    Alcohol use: Never     Alcohol/week: 0.0 standard drinks     Frequency: Never     Comment: occasionally  No alcohol 72h prior to sx    Drug use: No    Sexual activity: Not Currently     Partners: Male     Comment: hyst; mut monog   Lifestyle    Physical activity     Days per week: Not on file     Minutes per session: Not on file     Stress: Only a little   Relationships    Social connections     Talks on phone: Not on file     Gets together: Not on file     Attends Congregation service: Not on file     Active member of club or organization: Not on file     Attends meetings of clubs or organizations: Not on file     Relationship status: Not on file   Other Topics Concern    Not on file   Social History Narrative    Live w/ spouse and 2 dogs      Past Surgical History:   Procedure Laterality Date    breast reduction  10/02/2018     SECTION      X 1    CYSTOSCOPY W/ URETERAL STENT PLACEMENT Right 2019    Procedure: CYSTOSCOPY, WITH URETERAL STENT INSERTION;  Surgeon: Timmy Santiago IV, MD;  Location: [facility] OR;  Service: Urology;  Laterality: Right;    CYSTOSCOPY W/ URETERAL STENT REMOVAL  10/04/2019    DILATION AND CURETTAGE OF UTERUS      HYSTERECTOMY      RALH for fibroids (still has ovaries)    INSERTION OF VENOUS ACCESS PORT Left 2019    Procedure: INSERTION, VENOUS ACCESS PORT;  Surgeon: Ulisses Monzon MD;  Location: [facility] OR;  Service: General;  Laterality: Left;  Left internal jugular     LYSIS OF ADHESIONS OF URETER N/A 8/15/2019    Procedure: URETEROLYSIS;  Surgeon: Ismael Juarez MD;  Location: [facility] OR;  Service: OB/GYN;  Laterality: N/A;    OMENTECTOMY N/A 8/15/2019    Procedure: OMENTECTOMY;  Surgeon: Ismael Juarez MD;  Location: [facility] OR;  Service: OB/GYN;  Laterality: N/A;    RETROGRADE PYELOGRAPHY Right 2019    Procedure: PYELOGRAM, RETROGRADE;  Surgeon: Timmy Santiago IV, MD;  Location: [facility] OR;  Service: Urology;  Laterality: Right;    ROBOT-ASSISTED LAPAROSCOPIC SALPINGO-OOPHORECTOMY USING DA TIA XI Bilateral 8/15/2019    Procedure: XI ROBOTIC SALPINGO-OOPHORECTOMY;  Surgeon: Ismael Juarez MD;  Location: [facility] OR;  Service: OB/GYN;  Laterality: Bilateral;    TOTAL REDUCTION MAMMOPLASTY  2018    TUBAL LIGATION         Labs:  Lab Results   Component Value Date    WBC 7.48 2020     HGB 11.7 (L) 06/29/2020    HCT 38.2 06/29/2020    MCV 91 06/29/2020     06/29/2020     BMP  Lab Results   Component Value Date     06/29/2020    K 4.1 06/29/2020     06/29/2020    CO2 28 06/29/2020    BUN 16 06/29/2020    CREATININE 0.9 06/29/2020    CALCIUM 9.7 06/29/2020    ANIONGAP 8 06/29/2020    ESTGFRAFRICA >60 06/29/2020    EGFRNONAA >60 06/29/2020     Lab Results   Component Value Date    ALT 22 06/29/2020    AST 24 06/29/2020    ALKPHOS 93 06/29/2020    BILITOT 0.5 06/29/2020       No results found for: IRON, TIBC, FERRITIN, SATURATEDIRO  No results found for: [id number]  No results found for: FOLATE  Lab Results   Component Value Date    TSH 1.869 04/19/2013         Review of Systems   Constitutional: Negative for activity change, appetite change, chills, diaphoresis, fatigue, fever and unexpected weight change.   HENT: Negative for congestion, dental problem, drooling, ear discharge, ear pain, facial swelling, hearing loss, mouth sores, nosebleeds, postnasal drip, rhinorrhea, sinus pressure, sneezing, sore throat, tinnitus, trouble swallowing and voice change.    Eyes: Negative for photophobia, pain, discharge, redness, itching and visual disturbance.   Respiratory: Negative for cough, choking, chest tightness, shortness of breath, wheezing and stridor.    Cardiovascular: Negative for chest pain, palpitations and leg swelling.   Gastrointestinal: Negative for abdominal distention, abdominal pain, anal bleeding, blood in stool, constipation, diarrhea, nausea, rectal pain and vomiting.   Endocrine: Negative for cold intolerance, heat intolerance, polydipsia, polyphagia and polyuria.   Genitourinary: Negative for decreased urine volume, difficulty urinating, dyspareunia, dysuria, enuresis, flank pain, frequency, genital sores, hematuria, menstrual problem, pelvic pain, urgency, vaginal bleeding, vaginal discharge and vaginal pain.   Musculoskeletal: Negative for arthralgias, back pain,  gait problem, joint swelling, myalgias, neck pain and neck stiffness.   Skin: Negative for color change, pallor and rash.   Allergic/Immunologic: Negative for environmental allergies, food allergies and immunocompromised state.   Neurological: Negative for dizziness, tremors, seizures, syncope, facial asymmetry, speech difficulty, weakness, light-headedness, numbness and headaches.   Hematological: Negative for adenopathy. Does not bruise/bleed easily.   Psychiatric/Behavioral: Positive for dysphoric mood. Negative for agitation, behavioral problems, confusion, decreased concentration, hallucinations, self-injury, sleep disturbance and suicidal ideas. The patient is nervous/anxious. The patient is not hyperactive.        Objective:      Physical Exam  Vitals signs reviewed.   Constitutional:       General: She is not in acute distress.     Appearance: She is well-developed. She is obese. She is not diaphoretic.   HENT:      Head: Normocephalic and atraumatic.      Right Ear: External ear normal.      Left Ear: External ear normal.      Nose: Nose normal.      Right Sinus: No maxillary sinus tenderness or frontal sinus tenderness.      Left Sinus: No maxillary sinus tenderness or frontal sinus tenderness.      Mouth/Throat:      Pharynx: No oropharyngeal exudate.   Eyes:      General: Lids are normal. No scleral icterus.        Right eye: No discharge.         Left eye: No discharge.      Conjunctiva/sclera: Conjunctivae normal.      Right eye: Right conjunctiva is not injected. No hemorrhage.     Left eye: Left conjunctiva is not injected. No hemorrhage.     Pupils: Pupils are equal, round, and reactive to light.   Neck:      Musculoskeletal: Normal range of motion and neck supple.      Thyroid: No thyromegaly.      Vascular: No JVD.      Trachea: No tracheal deviation.   Cardiovascular:      Rate and Rhythm: Normal rate.   Pulmonary:      Effort: Pulmonary effort is normal. No respiratory distress.      Breath  sounds: No stridor.   Chest:      Chest wall: No tenderness.   Abdominal:      General: Bowel sounds are normal. There is no distension.      Palpations: There is no hepatomegaly, splenomegaly or mass.      Tenderness: There is no abdominal tenderness. There is no rebound.   Musculoskeletal: Normal range of motion.         General: No tenderness.   Lymphadenopathy:      Cervical: No cervical adenopathy.      Upper Body:      Right upper body: No supraclavicular adenopathy.      Left upper body: No supraclavicular adenopathy.   Skin:     General: Skin is dry.      Findings: No erythema or rash.   Neurological:      Mental Status: She is alert and oriented to person, place, and time.      Cranial Nerves: No cranial nerve deficit.      Coordination: Coordination normal.   Psychiatric:         Behavior: Behavior normal.         Thought Content: Thought content normal.         Judgment: Judgment normal.             Assessment:      1. Peritoneal carcinomatosis    2. Elevated cancer antigen 125 ()     3. Malignant neoplasm of ovary, unspecified laterality    4. 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 Morbid obesity    6. Encounter for antineoplastic chemotherapy           Plan:     The patient's UA today was +2 standing orders demonstrates straight that we should hold Avastin dosing in the past she has had this happen to her before she had a total protein in March of 594 mg. At this point I have agreed to dose today but would recommend that she have a 24 hr urine to document what been at level of proteinuria is to see if this been any progression.  As to whether not this would be nest sets to take holding any further dosing of Avastin.  Initially the patient left stated that she wanted to speak to Dr. Mckeon related return to receive her dosing.; the patient refused treatment today and will follow-up with her primary oncologist        Marcos Reeder Jr, MD FACP

## 2020-06-30 NOTE — PROGRESS NOTES
Subjective:       Patient ID: Casie Gaines is a 64 y.o. female.    Chief Complaint: Malignant neoplasm of right ovary [C56.1]  HPI: We have an opportunity to see Ms. Casie Gaines in Hematology Oncology clinic at [facility] on 06/30/2020.  Ms. Casie Gaines is a 64 y.o.  woman with peritoneal carcinomatosis with malignant right pleural effusion.  Final pathology is consistent with ovarian primary with  2740.  PET scan demonstrates multiple omental and peritoneal avid masses consistent with peritoneal carcinomatosis, extensive retroperitoneal and mediastinal lymphadenopathy, masslike structure in the right iliac fossa likely representing ovarian mass.  Patient has been evaluated by GYN Oncology Dr. Juarez and MD Green with recommendations for him carboplatin/Taxol/Avastin.      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  S/p 6 cycle avastin taxol carboplatin.  Has great TN after 6 cycles. On 8/15/2019 underwent salpingoophorectomy. Pathology showed CR. Currently on maintenance avastin.           Oncology History   Peritoneal carcinomatosis   1/26/2019 Initial Diagnosis    Peritoneal carcinomatosis     2/15/2019 - 7/8/2019 Chemotherapy    Treatment Summary   Plan Name: OP GYN PACLITAXEL CARBOPLATIN (AUC 6) Q3W  Treatment Goal: Palliative  Status: Inactive  Start Date: 2/28/2019  End Date: 6/18/2019  Provider: Will Trevizo Jr., MD  Chemotherapy: bevacizumab (AVASTIN) 15 mg/kg = 1,600 mg in sodium chloride 0.9% 100 mL chemo infusion, 15 mg/kg = 1,600 mg (100 % of original dose 15 mg/kg), Intravenous, Clinic/HOD 1 time, 6 of 6 cycles  Dose modification: 15 mg/kg (original dose 15 mg/kg, Cycle 1)  Administration: 1,600 mg (2/28/2019), 1,600 mg (3/21/2019), 1,600 mg (4/11/2019), 1,600 mg (5/7/2019), 1,600 mg (5/28/2019), 1,510 mg (6/18/2019)  CARBOplatin (PARAPLATIN) 870 mg in sodium chloride 0.9% 337 mL chemo infusion, 870 mg (100 % of original dose 868.8  mg), Intravenous, Clinic/HOD 1 time, 6 of 6 cycles  Dose modification:   (original dose 868.8 mg, Cycle 1)  Administration: 870 mg (2/28/2019), 870 mg (3/21/2019), 900 mg (4/11/2019), 870 mg (5/7/2019), 660 mg (5/28/2019), 690 mg (6/18/2019)  PACLitaxel (TAXOL) 175 mg/m2 = 396 mg in sodium chloride 0.9% 566 mL chemo infusion, 175 mg/m2 = 396 mg, Intravenous, Clinic/HOD 1 time, 6 of 6 cycles  Dose modification: 140 mg/m2 (80 % of original dose 175 mg/m2, Cycle 5)  Administration: 396 mg (2/28/2019), 396 mg (3/21/2019), 396 mg (4/11/2019), 396 mg (5/7/2019), 318 mg (5/28/2019), 306 mg (6/18/2019)     10/9/2019 -  Chemotherapy    Treatment Summary   Plan Name: OP BEVACIZUMAB Q3W   Treatment Goal: Maintenance  Status: Active  Start Date: 10/9/2019  End Date: 1/25/2021 (Planned)  Provider: Oscar Mckeon MD  Chemotherapy: bevacizumab (AVASTIN) 15 mg/kg = 1,615 mg in sodium chloride 0.9% 100 mL chemo infusion, 15 mg/kg = 1,615 mg, Intravenous, Clinic/HOD 1 time, 6 of 17 cycles  Administration: 1,615 mg (10/9/2019), 1,615 mg (10/29/2019), 1,615 mg (12/10/2019), 1,615 mg (1/21/2020), 1,600 mg (4/2/2020), 1,615 mg (4/23/2020)     Malignant neoplasm of right ovary   2/15/2019 Initial Diagnosis    Malignant neoplasm of right ovary     2/15/2019 - 7/8/2019 Chemotherapy    Treatment Summary   Plan Name: OP GYN PACLITAXEL CARBOPLATIN (AUC 6) Q3W  Treatment Goal: Palliative  Status: Inactive  Start Date: 2/28/2019  End Date: 6/18/2019  Provider: Will Trevizo Jr., MD  Chemotherapy: bevacizumab (AVASTIN) 15 mg/kg = 1,600 mg in sodium chloride 0.9% 100 mL chemo infusion, 15 mg/kg = 1,600 mg (100 % of original dose 15 mg/kg), Intravenous, Clinic/\[facility] [place] [facility]\"" 1 time, 6 of 6 cycles  Dose modification: 15 mg/kg (original dose 15 mg/kg, Cycle 1)  Administration: 1,600 mg (2/28/2019), 1,600 mg (3/21/2019), 1,600 mg (4/11/2019), 1,600 mg (5/7/2019), 1,600 mg (5/28/2019), 1,510 mg (6/18/2019)  CARBOplatin (PARAPLATIN) 870 mg in sodium chloride  0.9% 337 mL chemo infusion, 870 mg (100 % of original dose 868.8 mg), Intravenous, Clinic/HOD 1 time, 6 of 6 cycles  Dose modification:   (original dose 868.8 mg, Cycle 1)  Administration: 870 mg (2/28/2019), 870 mg (3/21/2019), 900 mg (4/11/2019), 870 mg (5/7/2019), 660 mg (5/28/2019), 690 mg (6/18/2019)  PACLitaxel (TAXOL) 175 mg/m2 = 396 mg in sodium chloride 0.9% 566 mL chemo infusion, 175 mg/m2 = 396 mg, Intravenous, Clinic/HOD 1 time, 6 of 6 cycles  Dose modification: 140 mg/m2 (80 % of original dose 175 mg/m2, Cycle 5)  Administration: 396 mg (2/28/2019), 396 mg (3/21/2019), 396 mg (4/11/2019), 396 mg (5/7/2019), 318 mg (5/28/2019), 306 mg (6/18/2019)     10/9/2019 -  Chemotherapy    Treatment Summary   Plan Name: OP BEVACIZUMAB Q3W   Treatment Goal: Maintenance  Status: Active  Start Date: 10/9/2019  End Date: 12/21/2020 (Planned)  Provider: Oscar Mckeon MD  Chemotherapy: bevacizumab (AVASTIN) 15 mg/kg = 1,615 mg in sodium chloride 0.9% 100 mL chemo infusion, 15 mg/kg = 1,615 mg, Intravenous, Clinic/HOD 1 time, 6 of 17 cycles  Administration: 1,615 mg (10/9/2019), 1,615 mg (10/29/2019), 1,615 mg (12/10/2019), 1,615 mg (1/21/2020), 1,600 mg (4/2/2020), 1,615 mg (4/23/2020)     Malignant neoplasm of both ovaries   2/18/2019 Initial Diagnosis    Ovarian cancer     10/9/2019 -  Chemotherapy    Treatment Summary   Plan Name: OP BEVACIZUMAB Q3W   Treatment Goal: Maintenance  Status: Active  Start Date: 10/9/2019  End Date: 12/21/2020 (Planned)  Provider: Oscar Mckeon MD  Chemotherapy: bevacizumab (AVASTIN) 15 mg/kg = 1,615 mg in sodium chloride 0.9% 100 mL chemo infusion, 15 mg/kg = 1,615 mg, Intravenous, Clinic/hospitals 1 time, 6 of 17 cycles  Administration: 1,615 mg (10/9/2019), 1,615 mg (10/29/2019), 1,615 mg (12/10/2019), 1,615 mg (1/21/2020), 1,600 mg (4/2/2020), 1,615 mg (4/23/2020)     Ovarian cancer, bilateral   8/15/2019 Initial Diagnosis    Ovarian cancer, bilateral     10/9/2019 -  Chemotherapy     Treatment Summary   Plan Name: OP BEVACIZUMAB Q3W   Treatment Goal: Maintenance  Status: Active  Start Date: 10/9/2019  End Date: 2020 (Planned)  Provider: Oscar Mckeon MD  Chemotherapy: bevacizumab (AVASTIN) 15 mg/kg = 1,615 mg in sodium chloride 0.9% 100 mL chemo infusion, 15 mg/kg = 1,615 mg, Intravenous, Clinic/HOD 1 time, 6 of 17 cycles  Administration: 1,615 mg (10/9/2019), 1,615 mg (10/29/2019), 1,615 mg (12/10/2019), 1,615 mg (2020), 1,600 mg (2020), 1,615 mg (2020)       Past Medical History:   Diagnosis Date    Anemia     Anxiety     Arthritis     knees    Cancer     ovarian    CHF (congestive heart failure)     Hx antineoplastic chemotherapy     last 2019    Hyperlipemia     Hypertension     Neck pain     Ovarian cancer 2019    CHEMO     Family History   Problem Relation Age of Onset    Anesthesia problems Other     Breast cancer Maternal Aunt     Breast cancer Paternal Aunt     Ovarian cancer Paternal Aunt     Colon cancer Brother     Thrombophilia Neg Hx      Social History     Socioeconomic History    Marital status:      Spouse name: Not on file    Number of children: Not on file    Years of education: Not on file    Highest education level: Not on file   Occupational History    Not on file   Social Needs    Financial resource strain: Not on file    Food insecurity     Worry: Not on file     Inability: Not on file    Transportation needs     Medical: Not on file     Non-medical: Not on file   Tobacco Use    Smoking status: Former Smoker     Packs/day: 1.00     Years: 25.00     Pack years: 25.00     Quit date: 10/1/2018     Years since quittin.7    Smokeless tobacco: Never Used    Tobacco comment: States started quit 2 months ago after 30 years   Substance and Sexual Activity    Alcohol use: Never     Alcohol/week: 0.0 standard drinks     Frequency: Never     Comment: occasionally  No alcohol 72h prior to sx    Drug use: No    Sexual  activity: Not Currently     Partners: Male     Comment: hyst; mut monog   Lifestyle    Physical activity     Days per week: Not on file     Minutes per session: Not on file    Stress: Only a little   Relationships    Social connections     Talks on phone: Not on file     Gets together: Not on file     Attends [employer] service: Not on file     Active member of club or organization: Not on file     Attends meetings of clubs or organizations: Not on file     Relationship status: Not on file   Other Topics Concern    Not on file   Social History Narrative    Live w/ spouse and 2 dogs      Past Surgical History:   Procedure Laterality Date    breast reduction  10/02/2018     SECTION      X 1    CYSTOSCOPY W/ URETERAL STENT PLACEMENT Right 2019    Procedure: CYSTOSCOPY, WITH URETERAL STENT INSERTION;  Surgeon: Timmy Santiago IV, MD;  Location: [facility] OR;  Service: Urology;  Laterality: Right;    CYSTOSCOPY W/ URETERAL STENT REMOVAL  10/04/2019    DILATION AND CURETTAGE OF UTERUS      HYSTERECTOMY      RALH for fibroids (still has ovaries)    INSERTION OF VENOUS ACCESS PORT Left 2019    Procedure: INSERTION, VENOUS ACCESS PORT;  Surgeon: Ulisses Monzon MD;  Location: [facility] OR;  Service: General;  Laterality: Left;  Left internal jugular     LYSIS OF ADHESIONS OF URETER N/A 8/15/2019    Procedure: URETEROLYSIS;  Surgeon: Ismael Juarez MD;  Location: [facility] OR;  Service: OB/GYN;  Laterality: N/A;    OMENTECTOMY N/A 8/15/2019    Procedure: OMENTECTOMY;  Surgeon: Ismael Juarez MD;  Location: [facility] OR;  Service: OB/GYN;  Laterality: N/A;    RETROGRADE PYELOGRAPHY Right 2019    Procedure: PYELOGRAM, RETROGRADE;  Surgeon: Timmy Santiago IV, MD;  Location: [facility] OR;  Service: Urology;  Laterality: Right;    ROBOT-ASSISTED LAPAROSCOPIC SALPINGO-OOPHORECTOMY USING DA TIA XI Bilateral 8/15/2019    Procedure: XI ROBOTIC SALPINGO-OOPHORECTOMY;  Surgeon: Ismael Juarez MD;  Location[facility];   Service: OB/GYN;  Laterality: Bilateral;    TOTAL REDUCTION MAMMOPLASTY  2018    TUBAL LIGATION       Current Outpatient Medications   Medication Sig Dispense Refill    albuterol 90 mcg/actuation inhaler Inhale 2 puffs into the lungs every 4 (four) hours as needed for Wheezing. Rescue 18 g 0    ALPRAZolam (XANAX) 0.25 MG tablet Take 1 tablet (0.25 mg total) by mouth 3 (three) times daily as needed for Anxiety. 60 tablet 2    amLODIPine (NORVASC) 5 MG tablet Take 2 tablets (10 mg total) by mouth once daily. 60 tablet 11    betamethasone valerate 0.1% (VALISONE) 0.1 % Crea Apply topically 2 (two) times daily. 45 g 1    biotin 1 mg tablet Take 1,000 mcg by mouth once daily.       cetirizine (ZYRTEC) 10 MG tablet Take 1 tablet (10 mg total) by mouth once daily. 30 tablet 5    diclofenac sodium (VOLTAREN) 1 % Gel Apply once a day to back as needed 100 g 1    diphenhydrAMINE-zinc acetate 1-0.1% (BENADRYL ITCH STOPPING) cream Apply topically 2 (two) times daily as needed for Itching. 28 g 0    EPINEPHrine (EPIPEN) 0.3 mg/0.3 mL AtIn Inject 0.3 mLs (0.3 mg total) into the muscle once. for 1 dose 2 each 0    gabapentin (NEURONTIN) 100 MG capsule Take 1 capsule (100 mg total) by mouth 3 (three) times daily. 90 capsule 11    HYDROcodone-acetaminophen (NORCO) 5-325 mg per tablet Take 1 tablet by mouth every 6 (six) hours as needed. 20 tablet 0    multivitamin with minerals tablet Take 1 tablet by mouth once daily.       olmesartan-hydrochlorothiazide (BENICAR HCT) 40-12.5 mg Tab Take 1 tablet by mouth once daily. 90 tablet 0    oxyCODONE-acetaminophen (PERCOCET)  mg per tablet Take 1 tablet by mouth every 8 (eight) hours as needed. 20 tablet 0    rosuvastatin (CRESTOR) 10 MG tablet Take 1 tablet (10 mg total) by mouth once daily. 90 tablet 1    sertraline (ZOLOFT) 25 MG tablet Take 1 tablet (25 mg total) by mouth once daily. 30 tablet 11    tolterodine (DETROL) 2 MG Tab Take 1 tablet (2 mg total) by  mouth 2 (two) times daily. 60 tablet 11    triamcinolone acetonide 0.1% (KENALOG) 0.1 % cream Apply topically 2 (two) times daily. 15 g 0    zinc gluconate 50 mg tablet Take 50 mg by mouth once daily.       No current facility-administered medications for this visit.        Labs:  Lab Results   Component Value Date    WBC 7.48 06/29/2020    HGB 11.7 (L) 06/29/2020    HCT 38.2 06/29/2020    MCV 91 06/29/2020     06/29/2020     BMP  Lab Results   Component Value Date     06/29/2020    K 4.1 06/29/2020     06/29/2020    CO2 28 06/29/2020    BUN 16 06/29/2020    CREATININE 0.9 06/29/2020    CALCIUM 9.7 06/29/2020    ANIONGAP 8 06/29/2020    ESTGFRAFRICA >60 06/29/2020    EGFRNONAA >60 06/29/2020     Lab Results   Component Value Date    ALT 22 06/29/2020    AST 24 06/29/2020    ALKPHOS 93 06/29/2020    BILITOT 0.5 06/29/2020       No results found for: IRON, TIBC, FERRITIN, SATURATEDIRO  No results found for: [id number]  No results found for: FOLATE  Lab Results   Component Value Date    TSH 1.869 04/19/2013       I have reviewed the radiology reports and examined the scan/xray images.    Review of Systems   Constitutional: Negative.    HENT: Negative.    Eyes: Negative.    Respiratory: Negative.    Cardiovascular: Negative.    Gastrointestinal: Negative.    Endocrine: Negative.    Genitourinary: Negative.    Musculoskeletal: Negative.    Skin: Negative.    Allergic/Immunologic: Negative.    Neurological: Negative.    Hematological: Negative.    Psychiatric/Behavioral: Negative.      ECOG SCORE    0 - Fully active-able to carry on all pre-disease performance without restriction            Objective:     Vitals:    07/01/20 1136   BP: 119/73   Pulse: 76   Temp: 98.2 °F (36.8 °C)   Body mass index is 39.5 kg/m².  Physical Exam  Vitals signs and nursing note reviewed.   Constitutional:       Appearance: She is well-developed.   HENT:      Head: Normocephalic and atraumatic.   Eyes:       Conjunctiva/sclera: Conjunctivae normal.   Neck:      Musculoskeletal: Normal range of motion and neck supple.   Cardiovascular:      Rate and Rhythm: Normal rate and regular rhythm.   Pulmonary:      Effort: Pulmonary effort is normal.      Breath sounds: Normal breath sounds.   Abdominal:      General: Bowel sounds are normal.      Palpations: Abdomen is soft.   Musculoskeletal: Normal range of motion.   Skin:     General: Skin is warm and dry.   Neurological:      Mental Status: She is alert and oriented to person, place, and time.   Psychiatric:         Behavior: Behavior normal.         Thought Content: Thought content normal.         Judgment: Judgment normal.           Assessment:      1. Malignant neoplasm of right ovary    2. Peritoneal carcinomatosis    3. Malignant neoplasm of both ovaries    4. Proteinuria, unspecified type           Plan:     Malignant neoplasm of right ovary  Continue on maintenance avastin, monitor UA proteins closely.  Peritoneal carcinomatosis    Malignant neoplasm of both ovaries    Proteinuria, unspecified type  -     Ambulatory referral/consult to Nephrology; Future; Expected date: 07/08/2020

## 2020-07-01 ENCOUNTER — OFFICE VISIT (OUTPATIENT)
Dept: HEMATOLOGY/ONCOLOGY | Facility: CLINIC | Age: 65
End: 2020-07-01
Payer: MEDICARE

## 2020-07-01 ENCOUNTER — INFUSION (OUTPATIENT)
Dept: INFUSION THERAPY | Facility: HOSPITAL | Age: 65
End: 2020-07-01
Attending: INTERNAL MEDICINE
Payer: MEDICARE

## 2020-07-01 VITALS
DIASTOLIC BLOOD PRESSURE: 70 MMHG | HEART RATE: 71 BPM | OXYGEN SATURATION: 99 % | TEMPERATURE: 98 F | RESPIRATION RATE: 18 BRPM | SYSTOLIC BLOOD PRESSURE: 116 MMHG

## 2020-07-01 VITALS
DIASTOLIC BLOOD PRESSURE: 73 MMHG | SYSTOLIC BLOOD PRESSURE: 119 MMHG | OXYGEN SATURATION: 98 % | WEIGHT: 252.19 LBS | TEMPERATURE: 98 F | HEART RATE: 76 BPM | BODY MASS INDEX: 39.58 KG/M2 | HEIGHT: 67 IN

## 2020-07-01 DIAGNOSIS — C56.3 MALIGNANT NEOPLASM OF BOTH OVARIES: ICD-10-CM

## 2020-07-01 DIAGNOSIS — C56.1 MALIGNANT NEOPLASM OF RIGHT OVARY: Primary | ICD-10-CM

## 2020-07-01 DIAGNOSIS — C56.1 MALIGNANT NEOPLASM OF RIGHT OVARY: ICD-10-CM

## 2020-07-01 DIAGNOSIS — C78.6 PERITONEAL CARCINOMATOSIS: ICD-10-CM

## 2020-07-01 DIAGNOSIS — R80.9 PROTEINURIA, UNSPECIFIED TYPE: ICD-10-CM

## 2020-07-01 DIAGNOSIS — C56.3 OVARIAN CANCER, BILATERAL: Primary | ICD-10-CM

## 2020-07-01 PROCEDURE — 99214 OFFICE O/P EST MOD 30 MIN: CPT | Mod: PBBFAC | Performed by: INTERNAL MEDICINE

## 2020-07-01 PROCEDURE — 96413 CHEMO IV INFUSION 1 HR: CPT

## 2020-07-01 PROCEDURE — 25000003 PHARM REV CODE 250: Performed by: INTERNAL MEDICINE

## 2020-07-01 PROCEDURE — 99999 PR PBB SHADOW E&M-EST. PATIENT-LVL IV: CPT | Mod: PBBFAC,,, | Performed by: INTERNAL MEDICINE

## 2020-07-01 PROCEDURE — 99215 PR OFFICE/OUTPT VISIT, EST, LEVL V, 40-54 MIN: ICD-10-PCS | Mod: S$PBB,,, | Performed by: INTERNAL MEDICINE

## 2020-07-01 PROCEDURE — 99999 PR PBB SHADOW E&M-EST. PATIENT-LVL IV: ICD-10-PCS | Mod: PBBFAC,,, | Performed by: INTERNAL MEDICINE

## 2020-07-01 PROCEDURE — 99215 OFFICE O/P EST HI 40 MIN: CPT | Mod: S$PBB,,, | Performed by: INTERNAL MEDICINE

## 2020-07-01 PROCEDURE — 63600175 PHARM REV CODE 636 W HCPCS: Mod: JG | Performed by: INTERNAL MEDICINE

## 2020-07-01 RX ORDER — HEPARIN 100 UNIT/ML
500 SYRINGE INTRAVENOUS
Status: DISCONTINUED | OUTPATIENT
Start: 2020-07-01 | End: 2020-07-01 | Stop reason: HOSPADM

## 2020-07-01 RX ORDER — SODIUM CHLORIDE 0.9 % (FLUSH) 0.9 %
10 SYRINGE (ML) INJECTION
Status: DISCONTINUED | OUTPATIENT
Start: 2020-07-01 | End: 2020-07-01 | Stop reason: HOSPADM

## 2020-07-01 RX ADMIN — HEPARIN 500 UNITS: 100 SYRINGE at 01:07

## 2020-07-01 RX ADMIN — BEVACIZUMAB 1600 MG: 400 INJECTION, SOLUTION INTRAVENOUS at 12:07

## 2020-07-01 NOTE — PLAN OF CARE
Problem: Adult Inpatient Plan of Care  Goal: Plan of Care Review  Outcome: Ongoing, Progressing  Flowsheets (Taken 7/1/2020 1301)  Plan of Care Reviewed With: patient  Goal: Patient-Specific Goal (Individualization)  Outcome: Ongoing, Progressing  Goal: Absence of Hospital-Acquired Illness or Injury  Outcome: Ongoing, Progressing  Goal: Optimal Comfort and Wellbeing  Outcome: Ongoing, Progressing  Goal: Readiness for Transition of Care  Outcome: Ongoing, Progressing  Goal: Rounds/Family Conference  Outcome: Ongoing, Progressing   Pt reported feeling great and no complaints. She denies any pain or discomfort.

## 2020-07-22 ENCOUNTER — INFUSION (OUTPATIENT)
Dept: INFUSION THERAPY | Facility: HOSPITAL | Age: 65
End: 2020-07-22
Attending: INTERNAL MEDICINE
Payer: MEDICARE

## 2020-07-22 ENCOUNTER — OFFICE VISIT (OUTPATIENT)
Dept: HEMATOLOGY/ONCOLOGY | Facility: CLINIC | Age: 65
End: 2020-07-22
Payer: MEDICARE

## 2020-07-22 VITALS
BODY MASS INDEX: 40.23 KG/M2 | DIASTOLIC BLOOD PRESSURE: 78 MMHG | WEIGHT: 256.81 LBS | OXYGEN SATURATION: 94 % | TEMPERATURE: 99 F | HEART RATE: 75 BPM | SYSTOLIC BLOOD PRESSURE: 117 MMHG

## 2020-07-22 VITALS
RESPIRATION RATE: 18 BRPM | OXYGEN SATURATION: 96 % | HEART RATE: 68 BPM | TEMPERATURE: 98 F | DIASTOLIC BLOOD PRESSURE: 93 MMHG | SYSTOLIC BLOOD PRESSURE: 150 MMHG

## 2020-07-22 DIAGNOSIS — C56.1 MALIGNANT NEOPLASM OF RIGHT OVARY: ICD-10-CM

## 2020-07-22 DIAGNOSIS — C56.3 MALIGNANT NEOPLASM OF BOTH OVARIES: ICD-10-CM

## 2020-07-22 DIAGNOSIS — C78.6 PERITONEAL CARCINOMATOSIS: ICD-10-CM

## 2020-07-22 DIAGNOSIS — C78.6 PERITONEAL CARCINOMATOSIS: Primary | ICD-10-CM

## 2020-07-22 DIAGNOSIS — C56.3 OVARIAN CANCER, BILATERAL: Primary | ICD-10-CM

## 2020-07-22 PROCEDURE — 99215 PR OFFICE/OUTPT VISIT, EST, LEVL V, 40-54 MIN: ICD-10-PCS | Mod: 25,S$PBB,, | Performed by: INTERNAL MEDICINE

## 2020-07-22 PROCEDURE — 99999 PR PBB SHADOW E&M-EST. PATIENT-LVL III: ICD-10-PCS | Mod: PBBFAC,,, | Performed by: INTERNAL MEDICINE

## 2020-07-22 PROCEDURE — 99213 OFFICE O/P EST LOW 20 MIN: CPT | Mod: PBBFAC | Performed by: INTERNAL MEDICINE

## 2020-07-22 PROCEDURE — 96413 CHEMO IV INFUSION 1 HR: CPT

## 2020-07-22 PROCEDURE — 63600175 PHARM REV CODE 636 W HCPCS: Performed by: INTERNAL MEDICINE

## 2020-07-22 PROCEDURE — 99999 PR PBB SHADOW E&M-EST. PATIENT-LVL III: CPT | Mod: PBBFAC,,, | Performed by: INTERNAL MEDICINE

## 2020-07-22 PROCEDURE — 25000003 PHARM REV CODE 250: Performed by: INTERNAL MEDICINE

## 2020-07-22 PROCEDURE — 99215 OFFICE O/P EST HI 40 MIN: CPT | Mod: 25,S$PBB,, | Performed by: INTERNAL MEDICINE

## 2020-07-22 RX ORDER — SODIUM CHLORIDE 0.9 % (FLUSH) 0.9 %
10 SYRINGE (ML) INJECTION
Status: CANCELLED | OUTPATIENT
Start: 2020-07-22

## 2020-07-22 RX ORDER — METHYLPREDNISOLONE SOD SUCC 125 MG
125 VIAL (EA) INJECTION ONCE AS NEEDED
Status: CANCELLED | OUTPATIENT
Start: 2020-07-22

## 2020-07-22 RX ORDER — DEXAMETHASONE 4 MG/1
TABLET ORAL
Qty: 30 TABLET | Refills: 0 | Status: SHIPPED | OUTPATIENT
Start: 2020-07-22 | End: 2020-08-06

## 2020-07-22 RX ORDER — METHYLPREDNISOLONE SOD SUCC 125 MG
125 VIAL (EA) INJECTION ONCE AS NEEDED
Status: DISCONTINUED | OUTPATIENT
Start: 2020-07-22 | End: 2020-07-22 | Stop reason: HOSPADM

## 2020-07-22 RX ORDER — HEPARIN 100 UNIT/ML
500 SYRINGE INTRAVENOUS
Status: CANCELLED | OUTPATIENT
Start: 2020-07-22

## 2020-07-22 RX ORDER — EPINEPHRINE 0.3 MG/.3ML
0.3 INJECTION SUBCUTANEOUS ONCE AS NEEDED
Status: CANCELLED | OUTPATIENT
Start: 2020-07-22

## 2020-07-22 RX ORDER — DEXAMETHASONE 4 MG/1
8 TABLET ORAL
Status: COMPLETED | OUTPATIENT
Start: 2020-07-22 | End: 2020-07-22

## 2020-07-22 RX ORDER — DIPHENHYDRAMINE HYDROCHLORIDE 50 MG/ML
25 INJECTION INTRAMUSCULAR; INTRAVENOUS EVERY 10 MIN PRN
Status: CANCELLED | OUTPATIENT
Start: 2020-07-22

## 2020-07-22 RX ORDER — DIPHENHYDRAMINE HYDROCHLORIDE 50 MG/ML
25 INJECTION INTRAMUSCULAR; INTRAVENOUS EVERY 10 MIN PRN
Status: DISCONTINUED | OUTPATIENT
Start: 2020-07-22 | End: 2020-07-22 | Stop reason: HOSPADM

## 2020-07-22 RX ORDER — DEXAMETHASONE 0.5 MG/1
8 TABLET ORAL
Status: CANCELLED
Start: 2020-07-22

## 2020-07-22 RX ORDER — EPINEPHRINE 0.3 MG/.3ML
0.3 INJECTION SUBCUTANEOUS ONCE AS NEEDED
Status: DISCONTINUED | OUTPATIENT
Start: 2020-07-22 | End: 2020-07-22 | Stop reason: HOSPADM

## 2020-07-22 RX ORDER — SODIUM CHLORIDE 0.9 % (FLUSH) 0.9 %
10 SYRINGE (ML) INJECTION
Status: DISCONTINUED | OUTPATIENT
Start: 2020-07-22 | End: 2020-07-22 | Stop reason: HOSPADM

## 2020-07-22 RX ORDER — HEPARIN 100 UNIT/ML
500 SYRINGE INTRAVENOUS
Status: DISCONTINUED | OUTPATIENT
Start: 2020-07-22 | End: 2020-07-22 | Stop reason: HOSPADM

## 2020-07-22 RX ADMIN — BEVACIZUMAB 1600 MG: 400 INJECTION, SOLUTION INTRAVENOUS at 11:07

## 2020-07-22 RX ADMIN — HEPARIN 500 UNITS: 100 SYRINGE at 12:07

## 2020-07-22 RX ADMIN — DEXAMETHASONE 8 MG: 4 TABLET ORAL at 10:07

## 2020-07-22 NOTE — PROGRESS NOTES
Subjective:       Patient ID: Casie Gaines is a 64 y.o. female.    Chief Complaint: Peritoneal carcinomatosis [C78.6, C80.1]  HPI: We have an opportunity to see Ms. Casie Gaines in Hematology Oncology clinic at [facility] on 07/21/2020.  Ms. Casie Gaines is a 64 y.o.  woman with peritoneal carcinomatosis with malignant right pleural effusion.  Final pathology is consistent with ovarian primary with  2740.  PET scan demonstrates multiple omental and peritoneal avid masses consistent with peritoneal carcinomatosis, extensive retroperitoneal and mediastinal lymphadenopathy, masslike structure in the right iliac fossa likely representing ovarian mass.  Patient has been evaluated by GYN Oncology Dr. Juarez and MD Green with recommendations for him carboplatin/Taxol/Avastin.      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  S/p 6 cycle avastin taxol carboplatin.  Has great UT after 6 cycles. On 8/15/2019 underwent salpingoophorectomy. Pathology showed CR. Currently on maintenance avastin.    Oncology History   Peritoneal carcinomatosis   1/26/2019 Initial Diagnosis    Peritoneal carcinomatosis     2/15/2019 - 7/8/2019 Chemotherapy    Treatment Summary   Plan Name: OP GYN PACLITAXEL CARBOPLATIN (AUC 6) Q3W  Treatment Goal: Palliative  Status: Inactive  Start Date: 2/28/2019  End Date: 6/18/2019  Provider: Will Trevizo Jr., MD  Chemotherapy: bevacizumab (AVASTIN) 15 mg/kg = 1,600 mg in sodium chloride 0.9% 100 mL chemo infusion, 15 mg/kg = 1,600 mg (100 % of original dose 15 mg/kg), Intravenous, Clinic/HOD 1 time, 6 of 6 cycles  Dose modification: 15 mg/kg (original dose 15 mg/kg, Cycle 1)  Administration: 1,600 mg (2/28/2019), 1,600 mg (3/21/2019), 1,600 mg (4/11/2019), 1,600 mg (5/7/2019), 1,600 mg (5/28/2019), 1,510 mg (6/18/2019)  CARBOplatin (PARAPLATIN) 870 mg in sodium chloride 0.9% 337 mL chemo infusion, 870 mg (100 % of original dose 868.8 mg),  Intravenous, Clinic/HOD 1 time, 6 of 6 cycles  Dose modification:   (original dose 868.8 mg, Cycle 1)  Administration: 870 mg (2/28/2019), 870 mg (3/21/2019), 900 mg (4/11/2019), 870 mg (5/7/2019), 660 mg (5/28/2019), 690 mg (6/18/2019)  PACLitaxel (TAXOL) 175 mg/m2 = 396 mg in sodium chloride 0.9% 566 mL chemo infusion, 175 mg/m2 = 396 mg, Intravenous, Clinic/HOD 1 time, 6 of 6 cycles  Dose modification: 140 mg/m2 (80 % of original dose 175 mg/m2, Cycle 5)  Administration: 396 mg (2/28/2019), 396 mg (3/21/2019), 396 mg (4/11/2019), 396 mg (5/7/2019), 318 mg (5/28/2019), 306 mg (6/18/2019)     10/9/2019 -  Chemotherapy    Treatment Summary   Plan Name: OP BEVACIZUMAB Q3W   Treatment Goal: Maintenance  Status: Active  Start Date: 10/9/2019  End Date: 1/27/2021 (Planned)  Provider: Oscar Mckeon MD  Chemotherapy: bevacizumab (AVASTIN) 15 mg/kg = 1,615 mg in sodium chloride 0.9% 100 mL chemo infusion, 15 mg/kg = 1,615 mg, Intravenous, Clinic/HOD 1 time, 7 of 17 cycles  Administration: 1,615 mg (10/9/2019), 1,615 mg (10/29/2019), 1,615 mg (12/10/2019), 1,615 mg (1/21/2020), 1,600 mg (4/2/2020), 1,615 mg (4/23/2020), 1,600 mg (7/1/2020)     Malignant neoplasm of right ovary   2/15/2019 Initial Diagnosis    Malignant neoplasm of right ovary     2/15/2019 - 7/8/2019 Chemotherapy    Treatment Summary   Plan Name: OP GYN PACLITAXEL CARBOPLATIN (AUC 6) Q3W  Treatment Goal: Palliative  Status: Inactive  Start Date: 2/28/2019  End Date: 6/18/2019  Provider: Will Trevizo Jr., MD  Chemotherapy: bevacizumab (AVASTIN) 15 mg/kg = 1,600 mg in sodium chloride 0.9% 100 mL chemo infusion, 15 mg/kg = 1,600 mg (100 % of original dose 15 mg/kg), Intravenous, Clinic[facility] 1 time, 6 of 6 cycles  Dose modification: 15 mg/kg (original dose 15 mg/kg, Cycle 1)  Administration: 1,600 mg (2/28/2019), 1,600 mg (3/21/2019), 1,600 mg (4/11/2019), 1,600 mg (5/7/2019), 1,600 mg (5/28/2019), 1,510 mg (6/18/2019)  CARBOplatin (PARAPLATIN) 870 mg in  sodium chloride 0.9% 337 mL chemo infusion, 870 mg (100 % of original dose 868.8 mg), Intravenous, Clinic/HOD 1 time, 6 of 6 cycles  Dose modification:   (original dose 868.8 mg, Cycle 1)  Administration: 870 mg (2/28/2019), 870 mg (3/21/2019), 900 mg (4/11/2019), 870 mg (5/7/2019), 660 mg (5/28/2019), 690 mg (6/18/2019)  PACLitaxel (TAXOL) 175 mg/m2 = 396 mg in sodium chloride 0.9% 566 mL chemo infusion, 175 mg/m2 = 396 mg, Intravenous, Clinic/HOD 1 time, 6 of 6 cycles  Dose modification: 140 mg/m2 (80 % of original dose 175 mg/m2, Cycle 5)  Administration: 396 mg (2/28/2019), 396 mg (3/21/2019), 396 mg (4/11/2019), 396 mg (5/7/2019), 318 mg (5/28/2019), 306 mg (6/18/2019)     10/9/2019 -  Chemotherapy    Treatment Summary   Plan Name: OP BEVACIZUMAB Q3W   Treatment Goal: Maintenance  Status: Active  Start Date: 10/9/2019  End Date: 12/21/2020 (Planned)  Provider: Oscar Mckeon MD  Chemotherapy: bevacizumab (AVASTIN) 15 mg/kg = 1,615 mg in sodium chloride 0.9% 100 mL chemo infusion, 15 mg/kg = 1,615 mg, Intravenous, Clinic/HOD 1 time, 6 of 17 cycles  Administration: 1,615 mg (10/9/2019), 1,615 mg (10/29/2019), 1,615 mg (12/10/2019), 1,615 mg (1/21/2020), 1,600 mg (4/2/2020), 1,615 mg (4/23/2020)     Malignant neoplasm of both ovaries   2/18/2019 Initial Diagnosis    Ovarian cancer     10/9/2019 -  Chemotherapy    Treatment Summary   Plan Name: OP BEVACIZUMAB Q3W   Treatment Goal: Maintenance  Status: Active  Start Date: 10/9/2019  End Date: 12/21/2020 (Planned)  Provider: Oscar Mckeon MD  Chemotherapy: bevacizumab (AVASTIN) 15 mg/kg = 1,615 mg in sodium chloride 0.9% 100 mL chemo infusion, 15 mg/kg = 1,615 mg, Intravenous, [facility] 1 time, 6 of 17 cycles  Administration: 1,615 mg (10/9/2019), 1,615 mg (10/29/2019), 1,615 mg (12/10/2019), 1,615 mg (1/21/2020), 1,600 mg (4/2/2020), 1,615 mg (4/23/2020)     Ovarian cancer, bilateral   8/15/2019 Initial Diagnosis    Ovarian cancer, bilateral     10/9/2019 -   Chemotherapy    Treatment Summary   Plan Name: OP BEVACIZUMAB Q3W   Treatment Goal: Maintenance  Status: Active  Start Date: 10/9/2019  End Date: 2020 (Planned)  Provider: Oscar Mckeon MD  Chemotherapy: bevacizumab (AVASTIN) 15 mg/kg = 1,615 mg in sodium chloride 0.9% 100 mL chemo infusion, 15 mg/kg = 1,615 mg, Intravenous, Clinic/HOD 1 time, 6 of 17 cycles  Administration: 1,615 mg (10/9/2019), 1,615 mg (10/29/2019), 1,615 mg (12/10/2019), 1,615 mg (2020), 1,600 mg (2020), 1,615 mg (2020)       Past Medical History:   Diagnosis Date    Anemia     Anxiety     Arthritis     knees    Cancer     ovarian    CHF (congestive heart failure)     Hx antineoplastic chemotherapy     last 2019    Hyperlipemia     Hypertension     Neck pain     Ovarian cancer 2019    CHEMO     Family History   Problem Relation Age of Onset    Anesthesia problems Other     Breast cancer Maternal Aunt     Breast cancer Paternal Aunt     Ovarian cancer Paternal Aunt     Colon cancer Brother     Thrombophilia Neg Hx      Social History     Socioeconomic History    Marital status:      Spouse name: Not on file    Number of children: Not on file    Years of education: Not on file    Highest education level: Not on file   Occupational History    Not on file   Social Needs    Financial resource strain: Not on file    Food insecurity     Worry: Not on file     Inability: Not on file    Transportation needs     Medical: Not on file     Non-medical: Not on file   Tobacco Use    Smoking status: Former Smoker     Packs/day: 1.00     Years: 25.00     Pack years: 25.00     Quit date: 10/1/2018     Years since quittin.8    Smokeless tobacco: Never Used    Tobacco comment: States started quit 2 months ago after 30 years   Substance and Sexual Activity    Alcohol use: Never     Alcohol/week: 0.0 standard drinks     Frequency: Never     Comment: occasionally  No alcohol 72h prior to sx    Drug use: No     Sexual activity: Not Currently     Partners: Male     Comment: hyst; mut monog   Lifestyle    Physical activity     Days per week: Not on file     Minutes per session: Not on file    Stress: Only a little   Relationships    Social connections     Talks on phone: Not on file     Gets together: Not on file     Attends [employer]ism service: Not on file     Active member of club or organization: Not on file     Attends meetings of clubs or organizations: Not on file     Relationship status: Not on file   Other Topics Concern    Not on file   Social History Narrative    Live w/ spouse and 2 dogs      Past Surgical History:   Procedure Laterality Date    breast reduction  10/02/2018     SECTION      X 1    CYSTOSCOPY W/ URETERAL STENT PLACEMENT Right 2019    Procedure: CYSTOSCOPY, WITH URETERAL STENT INSERTION;  Surgeon: Timmy Santiago IV, MD;  Location: [facility] OR;  Service: Urology;  Laterality: Right;    CYSTOSCOPY W/ URETERAL STENT REMOVAL  10/04/2019    DILATION AND CURETTAGE OF UTERUS      HYSTERECTOMY      RALH for fibroids (still has ovaries)    INSERTION OF VENOUS ACCESS PORT Left 2019    Procedure: INSERTION, VENOUS ACCESS PORT;  Surgeon: Ulisses Monzon MD;  Location: [facility] OR;  Service: General;  Laterality: Left;  Left internal jugular     LYSIS OF ADHESIONS OF URETER N/A 8/15/2019    Procedure: URETEROLYSIS;  Surgeon: Ismael Juarez MD;  Location: [facility] OR;  Service: OB/GYN;  Laterality: N/A;    OMENTECTOMY N/A 8/15/2019    Procedure: OMENTECTOMY;  Surgeon: Ismael Juarez MD;  Location: [facility] OR;  Service: OB/GYN;  Laterality: N/A;    RETROGRADE PYELOGRAPHY Right 2019    Procedure: PYELOGRAM, RETROGRADE;  Surgeon: Timmy Santiago IV, MD;  Location: [facility] OR;  Service: Urology;  Laterality: Right;    ROBOT-ASSISTED LAPAROSCOPIC SALPINGO-OOPHORECTOMY USING DA TIA XI Bilateral 8/15/2019    Procedure: XI ROBOTIC SALPINGO-OOPHORECTOMY;  Surgeon: Ismael Juarez MD;  Location:  [facility] OR;  Service: OB/GYN;  Laterality: Bilateral;    TOTAL REDUCTION MAMMOPLASTY  2018    TUBAL LIGATION       Current Outpatient Medications   Medication Sig Dispense Refill    albuterol 90 mcg/actuation inhaler Inhale 2 puffs into the lungs every 4 (four) hours as needed for Wheezing. Rescue 18 g 0    ALPRAZolam (XANAX) 0.25 MG tablet Take 1 tablet (0.25 mg total) by mouth 3 (three) times daily as needed for Anxiety. 60 tablet 2    amLODIPine (NORVASC) 5 MG tablet Take 2 tablets (10 mg total) by mouth once daily. 60 tablet 11    betamethasone valerate 0.1% (VALISONE) 0.1 % Crea Apply topically 2 (two) times daily. 45 g 1    biotin 1 mg tablet Take 1,000 mcg by mouth once daily.       cetirizine (ZYRTEC) 10 MG tablet Take 1 tablet (10 mg total) by mouth once daily. 30 tablet 5    diclofenac sodium (VOLTAREN) 1 % Gel Apply once a day to back as needed 100 g 1    diphenhydrAMINE-zinc acetate 1-0.1% (BENADRYL ITCH STOPPING) cream Apply topically 2 (two) times daily as needed for Itching. 28 g 0    EPINEPHrine (EPIPEN) 0.3 mg/0.3 mL AtIn Inject 0.3 mLs (0.3 mg total) into the muscle once. for 1 dose 2 each 0    gabapentin (NEURONTIN) 100 MG capsule Take 1 capsule (100 mg total) by mouth 3 (three) times daily. 90 capsule 11    HYDROcodone-acetaminophen (NORCO) 5-325 mg per tablet Take 1 tablet by mouth every 6 (six) hours as needed. 20 tablet 0    multivitamin with minerals tablet Take 1 tablet by mouth once daily.       olmesartan-hydrochlorothiazide (BENICAR HCT) 40-12.5 mg Tab Take 1 tablet by mouth once daily. 90 tablet 0    oxyCODONE-acetaminophen (PERCOCET)  mg per tablet Take 1 tablet by mouth every 8 (eight) hours as needed. 20 tablet 0    rosuvastatin (CRESTOR) 10 MG tablet Take 1 tablet (10 mg total) by mouth once daily. 90 tablet 1    sertraline (ZOLOFT) 25 MG tablet Take 1 tablet (25 mg total) by mouth once daily. 30 tablet 11    tolterodine (DETROL) 2 MG Tab Take 1 tablet (2 mg  total) by mouth 2 (two) times daily. 60 tablet 11    triamcinolone acetonide 0.1% (KENALOG) 0.1 % cream Apply topically 2 (two) times daily. 15 g 0    zinc gluconate 50 mg tablet Take 50 mg by mouth once daily.       No current facility-administered medications for this visit.        Labs:  Lab Results   Component Value Date    WBC 7.48 06/29/2020    HGB 11.7 (L) 06/29/2020    HCT 38.2 06/29/2020    MCV 91 06/29/2020     06/29/2020     BMP  Lab Results   Component Value Date     06/29/2020    K 4.1 06/29/2020     06/29/2020    CO2 28 06/29/2020    BUN 16 06/29/2020    CREATININE 0.9 06/29/2020    CALCIUM 9.7 06/29/2020    ANIONGAP 8 06/29/2020    ESTGFRAFRICA >60 06/29/2020    EGFRNONAA >60 06/29/2020     Lab Results   Component Value Date    ALT 22 06/29/2020    AST 24 06/29/2020    ALKPHOS 93 06/29/2020    BILITOT 0.5 06/29/2020       No results found for: IRON, TIBC, FERRITIN, SATURATEDIRO  No results found for: [id number]  No results found for: FOLATE  Lab Results   Component Value Date    TSH 1.869 04/19/2013       I have reviewed the radiology reports and examined the scan/xray images.    Review of Systems   Constitutional: Negative.    HENT: Negative.    Eyes: Negative.    Respiratory: Negative.    Cardiovascular: Negative.    Gastrointestinal: Negative.    Endocrine: Negative.    Genitourinary: Negative.    Musculoskeletal: Negative.    Skin: Negative.    Allergic/Immunologic: Negative.    Neurological: Negative.    Hematological: Negative.    Psychiatric/Behavioral: Negative.      ECOG SCORE    0 - Fully active-able to carry on all pre-disease performance without restriction            Objective:     Vitals:    07/22/20 0920   BP: 117/78   Pulse: 75   Temp: 98.5 °F (36.9 °C)   Body mass index is 40.23 kg/m².  Physical Exam  Vitals signs and nursing note reviewed.   Constitutional:       Appearance: She is well-developed.   HENT:      Head: Normocephalic and atraumatic.   Eyes:       Conjunctiva/sclera: Conjunctivae normal.   Neck:      Musculoskeletal: Normal range of motion and neck supple.   Cardiovascular:      Rate and Rhythm: Normal rate and regular rhythm.   Pulmonary:      Effort: Pulmonary effort is normal.      Breath sounds: Normal breath sounds.   Abdominal:      General: Bowel sounds are normal.      Palpations: Abdomen is soft.   Musculoskeletal: Normal range of motion.   Skin:     General: Skin is warm and dry.   Neurological:      Mental Status: She is alert and oriented to person, place, and time.   Psychiatric:         Behavior: Behavior normal.         Thought Content: Thought content normal.         Judgment: Judgment normal.           Assessment:      1. Peritoneal carcinomatosis    2. Malignant neoplasm of right ovary           Plan:     Peritoneal carcinomatosis  Reports has redness at right wrist for 2 days after infusion.  Continue on maintenance Avastin.  Will give decadron for days 1-3 of avastin.  -     dexAMETHasone (DECADRON) 4 MG Tab; Please take 8 mg daily for 2 days after chemo, on days 2 and 3.  Dispense: 30 tablet; Refill: 0    Malignant neoplasm of right ovary

## 2020-07-22 NOTE — DISCHARGE INSTRUCTIONS
"[facility]  [address] [address].  or  [address]  [phone] phone     [phone] fax  Hours of Operation: Monday- Friday 8:00am- 5:00pm  After hours phone  [phone]  Hematology / Oncology Physicians on call      Dr. Varinder Perrin, ASHLEIGH Espinosa NP Tyesha Taylor, NP    Please call with any concerns regarding your appointment today.Support Groups/Classes    Support groups and classes are being offered at the   [facility] and [facility]!!    "Cooking with Cancer" (Nutrition Class):  Second Wednesday of each month   at noon at the [facility].  Metastatic Support Group:  Third Tuesday of each month   at noon at the [facility].  Next Steps Class/Group: Second and fourth Thursday of each month at noon at the [facility].  [facility] (Breast Cancer Support Group): First Tuesday of each month   at 5:30pm at the [facility] location.  [facility] [facility]: [facility]: Second and third Tuesday of each month from 7:30am - 2pm.  [facility]: First and fourth Tuesday of each month from 7:30am - 2pm    If you are interested in attending or would like more information please ask our social workers or your nurse!    "

## 2020-08-05 ENCOUNTER — PATIENT OUTREACH (OUTPATIENT)
Dept: ADMINISTRATIVE | Facility: OTHER | Age: 65
End: 2020-08-05

## 2020-08-06 ENCOUNTER — OFFICE VISIT (OUTPATIENT)
Dept: NEPHROLOGY | Facility: CLINIC | Age: 65
End: 2020-08-06
Payer: MEDICARE

## 2020-08-06 VITALS
HEART RATE: 80 BPM | HEIGHT: 67 IN | SYSTOLIC BLOOD PRESSURE: 140 MMHG | DIASTOLIC BLOOD PRESSURE: 80 MMHG | WEIGHT: 259.06 LBS | BODY MASS INDEX: 40.66 KG/M2 | RESPIRATION RATE: 20 BRPM

## 2020-08-06 DIAGNOSIS — E66.01 SEVERE OBESITY: ICD-10-CM

## 2020-08-06 DIAGNOSIS — R80.9 PROTEINURIA, UNSPECIFIED TYPE: ICD-10-CM

## 2020-08-06 DIAGNOSIS — N18.1 CHRONIC KIDNEY DISEASE, STAGE 1: ICD-10-CM

## 2020-08-06 DIAGNOSIS — I10 HTN (HYPERTENSION), BENIGN: Primary | ICD-10-CM

## 2020-08-06 DIAGNOSIS — E66.01 MORBID OBESITY: ICD-10-CM

## 2020-08-06 DIAGNOSIS — E66.01 MORBID OBESITY: Primary | ICD-10-CM

## 2020-08-06 PROCEDURE — 99204 OFFICE O/P NEW MOD 45 MIN: CPT | Mod: S$PBB,,, | Performed by: INTERNAL MEDICINE

## 2020-08-06 PROCEDURE — 99999 PR PBB SHADOW E&M-EST. PATIENT-LVL V: CPT | Mod: PBBFAC,,, | Performed by: INTERNAL MEDICINE

## 2020-08-06 PROCEDURE — 99999 PR PBB SHADOW E&M-EST. PATIENT-LVL V: ICD-10-PCS | Mod: PBBFAC,,, | Performed by: INTERNAL MEDICINE

## 2020-08-06 PROCEDURE — 99204 PR OFFICE/OUTPT VISIT, NEW, LEVL IV, 45-59 MIN: ICD-10-PCS | Mod: S$PBB,,, | Performed by: INTERNAL MEDICINE

## 2020-08-06 PROCEDURE — 99215 OFFICE O/P EST HI 40 MIN: CPT | Mod: PBBFAC | Performed by: INTERNAL MEDICINE

## 2020-08-06 NOTE — PROGRESS NOTES
Subjective:       Patient ID: Casie Gaines is a 64 y.o.    female who presents for new evaluation of Proteinuria, HTN     Rossana Ang MD     Urology - Dr Gottlieb       HPI : Casie Gaines is a pleasant 64-year-old  woman with history of ovarian cancer, hypertension, seen in office today in consultation for above medical problems on referral from her oncologist, pertinent previous provider notes and lab results were reviewed, patient was diagnosed with right ovarian cancer and peritoneal carcinomatous in , she is currently on treatment with Avastin, in  she developed hydronephrosis on the right side, had a ureteric stent placed in 2019, this was removed in 2019, she used to see Dr. Johnson , she is requesting to establish care with Urology here, recent labs reviewed and discussed with the patient, stable renal function with a serum creatinine of 0.8 mg/dL, she has 2+ proteinuria on recent urinalysis, she is here to discuss more about proteinuria,      Past Medical History:   Diagnosis Date    Anemia     Anxiety     Arthritis     knees    Cancer     ovarian    CHF (congestive heart failure)     Hx antineoplastic chemotherapy     last 2019    Hyperlipemia     Hypertension     Neck pain     Ovarian cancer 2019    CHEMO       Past Surgical History:   Procedure Laterality Date    breast reduction  10/02/2018     SECTION      X 1    CYSTOSCOPY W/ URETERAL STENT PLACEMENT Right 2019    Procedure: CYSTOSCOPY, WITH URETERAL STENT INSERTION;  Surgeon: Timmy Santiago IV, MD;  Location: [facility];  Service: Urology;  Laterality: Right;    CYSTOSCOPY W/ URETERAL STENT REMOVAL  10/04/2019    DILATION AND CURETTAGE OF UTERUS      HYSTERECTOMY      RALH for fibroids (still has ovaries)    INSERTION OF VENOUS ACCESS PORT Left 2019    Procedure: INSERTION, VENOUS ACCESS PORT;  Surgeon: Ulisses Monzon MD;  Location: [facility] OR;   Service: General;  Laterality: Left;  Left internal jugular     LYSIS OF ADHESIONS OF URETER N/A 8/15/2019    Procedure: URETEROLYSIS;  Surgeon: Ismael Juarez MD;  Location: [facility] OR;  Service: OB/GYN;  Laterality: N/A;    OMENTECTOMY N/A 8/15/2019    Procedure: OMENTECTOMY;  Surgeon: Ismael Juarez MD;  Location: [facility] OR;  Service: OB/GYN;  Laterality: N/A;    RETROGRADE PYELOGRAPHY Right 1/30/2019    Procedure: PYELOGRAM, RETROGRADE;  Surgeon: Timmy Santiago IV, MD;  [facility] OR;  Service: Urology;  Laterality: Right;    ROBOT-ASSISTED LAPAROSCOPIC SALPINGO-OOPHORECTOMY USING DA TIA XI Bilateral 8/15/2019    Procedure: XI ROBOTIC SALPINGO-OOPHORECTOMY;  Surgeon: Ismael Juarez MD;  Location: [facility] OR;  Service: OB/GYN;  Laterality: Bilateral;    TOTAL REDUCTION MAMMOPLASTY  2018    TUBAL LIGATION             Current Outpatient Medications on File Prior to Visit   Medication Sig Dispense Refill    albuterol 90 mcg/actuation inhaler Inhale 2 puffs into the lungs every 4 (four) hours as needed for Wheezing. Rescue 18 g 0    ALPRAZolam (XANAX) 0.25 MG tablet Take 1 tablet (0.25 mg total) by mouth 3 (three) times daily as needed for Anxiety. 60 tablet 2    amLODIPine (NORVASC) 5 MG tablet Take 2 tablets (10 mg total) by mouth once daily. 60 tablet 11    betamethasone valerate 0.1% (VALISONE) 0.1 % Crea Apply topically 2 (two) times daily. 45 g 1    biotin 1 mg tablet Take 1,000 mcg by mouth once daily.       cetirizine (ZYRTEC) 10 MG tablet Take 1 tablet (10 mg total) by mouth once daily. 30 tablet 5    dexAMETHasone (DECADRON) 4 MG Tab Please take 8 mg daily for 2 days after chemo, on days 2 and 3. 30 tablet 0    diclofenac sodium (VOLTAREN) 1 % Gel Apply once a day to back as needed 100 g 1    diphenhydrAMINE-zinc acetate 1-0.1% (BENADRYL ITCH STOPPING) cream Apply topically 2 (two) times daily as needed for Itching. 28 g 0    EPINEPHrine (EPIPEN) 0.3 mg/0.3 mL AtIn Inject 0.3 mLs (0.3 mg  total) into the muscle once. for 1 dose 2 each 0    gabapentin (NEURONTIN) 100 MG capsule Take 1 capsule (100 mg total) by mouth 3 (three) times daily. 90 capsule 11    HYDROcodone-acetaminophen (NORCO) 5-325 mg per tablet Take 1 tablet by mouth every 6 (six) hours as needed. 20 tablet 0    multivitamin with minerals tablet Take 1 tablet by mouth once daily.       olmesartan-hydrochlorothiazide (BENICAR HCT) 40-12.5 mg Tab Take 1 tablet by mouth once daily. 90 tablet 0    oxyCODONE-acetaminophen (PERCOCET)  mg per tablet Take 1 tablet by mouth every 8 (eight) hours as needed. 20 tablet 0    rosuvastatin (CRESTOR) 10 MG tablet Take 1 tablet (10 mg total) by mouth once daily. 90 tablet 1    sertraline (ZOLOFT) 25 MG tablet Take 1 tablet (25 mg total) by mouth once daily. 30 tablet 11    triamcinolone acetonide 0.1% (KENALOG) 0.1 % cream Apply topically 2 (two) times daily. 15 g 0    zinc gluconate 50 mg tablet Take 50 mg by mouth once daily.      tolterodine (DETROL) 2 MG Tab Take 1 tablet (2 mg total) by mouth 2 (two) times daily. 60 tablet 11     No current facility-administered medications on file prior to visit.      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   HENT: Negative for congestion, facial swelling, sore throat, trouble swallowing and voice change.    Eyes: Negative for redness and visual disturbance.   Respiratory: Negative for apnea, cough, chest tightness, shortness of breath and wheezing.    Cardiovascular: Negative for chest pain, palpitations and leg swelling.   Gastrointestinal: Negative for abdominal distention, abdominal pain, blood in stool, constipation, diarrhea,  nausea and vomiting.   Genitourinary: Negative for decreased urine volume, difficulty urinating, dysuria, flank pain, frequency, hematuria, pelvic pain and urgency.   Musculoskeletal: Positive for arthralgias. Negative for back pain, gait problem and joint swelling.   Skin: Negative for color change and rash.   Neurological: Negative for dizziness, syncope, weakness and headaches.   Hematological: Does not bruise/bleed easily.   Psychiatric/Behavioral: Negative for agitation, behavioral problems and confusion. The patient is not nervous/anxious.      :            Objective:         Vitals:    08/06/20 1545   BP: (!) 140/80   Pulse: 80   Resp: 20       Weight 259 lb    Physical Exam  Constitutional:       General: She is not in acute distress.     Appearance: She is well-developed.   HENT:      Head: Normocephalic and atraumatic.      Mouth/Throat:      Pharynx: No oropharyngeal exudate.   Eyes:      General: No scleral icterus.     Conjunctiva/sclera: Conjunctivae normal.      Pupils: Pupils are equal, round, and reactive to light.   Neck:      Musculoskeletal: Normal range of motion and neck supple.      Thyroid: No thyroid mass or thyromegaly.      Vascular: No carotid bruit or JVD.      Trachea: No tracheal deviation.   Cardiovascular:      Rate and Rhythm: Normal rate and regular rhythm.      Heart sounds: Normal heart sounds. No murmur. No friction rub. No gallop.    Pulmonary:      Effort: Pulmonary effort is normal. No respiratory distress.      Breath sounds: Normal breath sounds. No wheezing or rales.   Chest:      Chest wall: No tenderness.   Abdominal:      General: Bowel sounds are normal. There is no distension or abdominal bruit.      Palpations: Abdomen is soft. There is no mass.      Tenderness: There is no abdominal tenderness. There is no guarding or rebound.      Comments: Obese abdomen    Musculoskeletal: Normal range of motion.         General: No tenderness.   Lymphadenopathy:      Cervical: No  cervical adenopathy.   Skin:     General: Skin is warm.      Coloration: Skin is not pale.      Findings: No erythema or rash.   Neurological:      Mental Status: She is alert and oriented to person, place, and time.      Cranial Nerves: No cranial nerve deficit.      Motor: No abnormal muscle tone.      Coordination: Coordination normal.      Deep Tendon Reflexes: Reflexes are normal and symmetric. Reflexes normal.   Psychiatric:         Behavior: Behavior normal.         Judgment: Judgment normal.       :              Labs:    Lab Results   Component Value Date    CREATININE 0.8 07/22/2020    BUN 21 07/22/2020     07/22/2020    K 3.9 07/22/2020     07/22/2020    CO2 26 07/22/2020       Lab Results   Component Value Date    WBC 8.30 07/22/2020    HGB 11.7 (L) 07/22/2020    HCT 38.0 07/22/2020    MCV 90 07/22/2020     07/22/2020         Lab Results   Component Value Date    CALCIUM 9.3 07/22/2020    PHOS 3.1 01/27/2019       Lab Results   Component Value Date    ALBUMIN 3.4 (L) 07/22/2020       Lab Results   Component Value Date    URICACID 5.2 11/19/2019       Impression and Plan :  64-year-old  woman with history of hypertension, right ovarian cancer, seen in office today in consultation for following medical problems    1.  Proteinuria, most likely due to history of hypertension, discussed and educated patient about proteinuria, continue Benicar, repeat urinalysis today, Avastin is known to cause proteinuria, no need to stop this medication at this time, will continue to monitor,    2.  Hypertension, discussed importance of blood pressure control, discussed low-salt diet, will enroll in digital blood pressure monitoring program, target blood pressure less than 130/80,    3.  Stable renal function with a serum creatinine of 0.8 mg/dL,    4.  Repeat a renal ultrasound to evaluate for hydronephrosis, this has resolved after stent placement in the past, the stent was removed,    5.   Right ovarian cancer, continue follow-up with Oncology,    Return to clinic in about 6 weeks, more than 45 min of face-to-face time was spent with the patient and her  discussing labs and plan of care, thank you for involving us in the care of this pleasant woman,    Sincerely,        Matt Benavidez MD

## 2020-08-06 NOTE — PATIENT INSTRUCTIONS
Avoid NSAID pain medications such as advil, aleve, motrin, ibuprofen, naprosyn, meloxicam, diclofenac, mobic.     1) goal blood pressure is less than 130/80    2)  keep a track of daily BPs and bring at next appt    3 ) bring all meds in a bag at next appt

## 2020-08-06 NOTE — LETTER
August 6, 2020      Oscar Mckeon MD  [address]  [place] [address]           [facility]  [address]  [place] [address]  Phone: [phone]  Fax: [phone]          Patient: Casie Gaines   MR Number: [id number]   YOB: 1955   Date of Visit: 8/6/2020       Dear Dr. Oscar Mckeon:    Thank you for referring Casie Gaines to me for evaluation. Attached you will find relevant portions of my assessment and plan of care.    If you have questions, please do not hesitate to call me. I look forward to following Casie Gaines along with you.    Sincerely,    Matt Benavidez MD    Enclosure  CC:  No Recipients    If you would like to receive this communication electronically, please contact [email or web address] or [phone] to request more information on [facility] access.    For providers and/or their staff who would like to refer a patient to [facility], please contact us through our one-stop-shop provider referral line, [facility], at [phone].    If you feel you have received this communication in error or would no longer like to receive these types of communications, please e-mail [email or web address]

## 2020-08-13 ENCOUNTER — INFUSION (OUTPATIENT)
Dept: INFUSION THERAPY | Facility: HOSPITAL | Age: 65
End: 2020-08-13
Attending: INTERNAL MEDICINE
Payer: MEDICARE

## 2020-08-13 ENCOUNTER — OFFICE VISIT (OUTPATIENT)
Dept: HEMATOLOGY/ONCOLOGY | Facility: CLINIC | Age: 65
End: 2020-08-13
Payer: MEDICARE

## 2020-08-13 VITALS
OXYGEN SATURATION: 98 % | HEIGHT: 67 IN | DIASTOLIC BLOOD PRESSURE: 81 MMHG | BODY MASS INDEX: 40.07 KG/M2 | TEMPERATURE: 98 F | WEIGHT: 255.31 LBS | HEART RATE: 74 BPM | RESPIRATION RATE: 14 BRPM | SYSTOLIC BLOOD PRESSURE: 133 MMHG

## 2020-08-13 VITALS
RESPIRATION RATE: 16 BRPM | HEART RATE: 72 BPM | TEMPERATURE: 98 F | SYSTOLIC BLOOD PRESSURE: 142 MMHG | OXYGEN SATURATION: 98 % | DIASTOLIC BLOOD PRESSURE: 82 MMHG

## 2020-08-13 DIAGNOSIS — C78.6 PERITONEAL CARCINOMATOSIS: ICD-10-CM

## 2020-08-13 DIAGNOSIS — C56.3 MALIGNANT NEOPLASM OF BOTH OVARIES: ICD-10-CM

## 2020-08-13 DIAGNOSIS — C56.1 MALIGNANT NEOPLASM OF RIGHT OVARY: ICD-10-CM

## 2020-08-13 DIAGNOSIS — C56.3 OVARIAN CANCER, BILATERAL: Primary | ICD-10-CM

## 2020-08-13 DIAGNOSIS — C56.3 MALIGNANT NEOPLASM OF BOTH OVARIES: Primary | ICD-10-CM

## 2020-08-13 PROCEDURE — 99215 OFFICE O/P EST HI 40 MIN: CPT | Mod: 25,S$PBB,, | Performed by: INTERNAL MEDICINE

## 2020-08-13 PROCEDURE — A4216 STERILE WATER/SALINE, 10 ML: HCPCS | Performed by: INTERNAL MEDICINE

## 2020-08-13 PROCEDURE — 96413 CHEMO IV INFUSION 1 HR: CPT

## 2020-08-13 PROCEDURE — 63600175 PHARM REV CODE 636 W HCPCS: Mod: JG | Performed by: INTERNAL MEDICINE

## 2020-08-13 PROCEDURE — 99999 PR PBB SHADOW E&M-EST. PATIENT-LVL IV: CPT | Mod: PBBFAC,,, | Performed by: INTERNAL MEDICINE

## 2020-08-13 PROCEDURE — 25000003 PHARM REV CODE 250: Performed by: INTERNAL MEDICINE

## 2020-08-13 PROCEDURE — 99214 OFFICE O/P EST MOD 30 MIN: CPT | Mod: PBBFAC,25 | Performed by: INTERNAL MEDICINE

## 2020-08-13 PROCEDURE — 99215 PR OFFICE/OUTPT VISIT, EST, LEVL V, 40-54 MIN: ICD-10-PCS | Mod: 25,S$PBB,, | Performed by: INTERNAL MEDICINE

## 2020-08-13 PROCEDURE — 99999 PR PBB SHADOW E&M-EST. PATIENT-LVL IV: ICD-10-PCS | Mod: PBBFAC,,, | Performed by: INTERNAL MEDICINE

## 2020-08-13 RX ORDER — DIPHENHYDRAMINE HYDROCHLORIDE 50 MG/ML
25 INJECTION INTRAMUSCULAR; INTRAVENOUS EVERY 10 MIN PRN
Status: CANCELLED | OUTPATIENT
Start: 2020-08-13

## 2020-08-13 RX ORDER — SODIUM CHLORIDE 0.9 % (FLUSH) 0.9 %
10 SYRINGE (ML) INJECTION
Status: DISCONTINUED | OUTPATIENT
Start: 2020-08-13 | End: 2020-08-13 | Stop reason: HOSPADM

## 2020-08-13 RX ORDER — DEXAMETHASONE 0.5 MG/1
8 TABLET ORAL
Status: CANCELLED
Start: 2020-08-13

## 2020-08-13 RX ORDER — EPINEPHRINE 0.3 MG/.3ML
0.3 INJECTION SUBCUTANEOUS ONCE AS NEEDED
Status: CANCELLED | OUTPATIENT
Start: 2020-08-13

## 2020-08-13 RX ORDER — DEXAMETHASONE 4 MG/1
TABLET ORAL
Status: DISPENSED
Start: 2020-08-13 | End: 2020-08-13

## 2020-08-13 RX ORDER — SODIUM CHLORIDE 0.9 % (FLUSH) 0.9 %
10 SYRINGE (ML) INJECTION
Status: CANCELLED | OUTPATIENT
Start: 2020-08-13

## 2020-08-13 RX ORDER — HYDROXYZINE HYDROCHLORIDE 25 MG/1
25 TABLET, FILM COATED ORAL 3 TIMES DAILY PRN
Qty: 90 TABLET | Refills: 1 | Status: SHIPPED | OUTPATIENT
Start: 2020-08-13 | End: 2024-01-26

## 2020-08-13 RX ORDER — HEPARIN 100 UNIT/ML
SYRINGE INTRAVENOUS
Status: DISPENSED
Start: 2020-08-13 | End: 2020-08-13

## 2020-08-13 RX ORDER — DEXAMETHASONE 4 MG/1
8 TABLET ORAL
Status: COMPLETED | OUTPATIENT
Start: 2020-08-13 | End: 2020-08-13

## 2020-08-13 RX ORDER — METHYLPREDNISOLONE SOD SUCC 125 MG
125 VIAL (EA) INJECTION ONCE AS NEEDED
Status: CANCELLED | OUTPATIENT
Start: 2020-08-13

## 2020-08-13 RX ORDER — HEPARIN 100 UNIT/ML
500 SYRINGE INTRAVENOUS
Status: DISCONTINUED | OUTPATIENT
Start: 2020-08-13 | End: 2020-08-13 | Stop reason: HOSPADM

## 2020-08-13 RX ORDER — HEPARIN 100 UNIT/ML
500 SYRINGE INTRAVENOUS
Status: CANCELLED | OUTPATIENT
Start: 2020-08-13

## 2020-08-13 RX ADMIN — Medication 10 ML: at 10:08

## 2020-08-13 RX ADMIN — DEXAMETHASONE 8 MG: 4 TABLET ORAL at 10:08

## 2020-08-13 RX ADMIN — BEVACIZUMAB 1600 MG: 400 INJECTION, SOLUTION INTRAVENOUS at 10:08

## 2020-08-13 RX ADMIN — HEPARIN 500 UNITS: 100 SYRINGE at 11:08

## 2020-08-13 NOTE — DISCHARGE INSTRUCTIONS
[facility]  [address] [facility]  [address]  [phone] phone     [phone] fax  Hours of Operation: Monday- Friday 8:00am- 5:00pm  After hours phone  [phone]  Hematology / Oncology Physicians on call      Dr. Varinder Perrin, ASHLEIGH Espinosa, ASHLEIGH Pineda NP    Please call with any concerns regarding your appointment today.WAYS TO HELP PREVENT INFECTION         WASH YOUR HANDS OFTEN DURING THE DAY, ESPECIALLY BEFORE YOU EAT, AFTER USING THE BATHROOM, AND AFTER TOUCHING ANIMALS     STAY AWAY FROM PEOPLE WHO HAVE ILLNESSES YOU CAN CATCH; SUCH AS COLDS, FLU, CHICKEN POX     TRY TO AVOID CROWDS     STAY AWAY FROM CHILDREN WHO RECENTLY HAVE RECEIVED LIVE VIRUS VACCINES     MAINTAIN GOOD MOUTH CARE     DO NOT SQUEEZE OR SCRATCH PIMPLES     CLEAN CUTS & SCRAPES RIGHT AWAY AND DAILY UNTIL HEALED WITH WARM WATER, SOAP & AN ANTISEPTIC     AVOID CONTACT WITH LITTER BOXES, BIRD CAGES, & FISH TANKS     AVOID STANDING WATER, IE., BIRD BATHS, FLOWER POTS/VASES, OR HUMIDIFIERS     WEAR GLOVES WHEN GARDENING OR CLEANING UP AFTER OTHERS, ESPECIALLY BABIES & SMALL CHILDREN    DO NOT EAT RAW FISH, SEAFOOD, MEAT, OR EGGS

## 2020-08-13 NOTE — PLAN OF CARE
Pt said she broke out in hives a couple of infusions ago. She thinks it may be an allergy from her dogs.

## 2020-08-14 DIAGNOSIS — C56.9 MALIGNANT NEOPLASM OF OVARY, UNSPECIFIED LATERALITY: ICD-10-CM

## 2020-08-16 RX ORDER — AMLODIPINE BESYLATE 5 MG/1
10 TABLET ORAL DAILY
Qty: 60 TABLET | Refills: 11 | Status: SHIPPED | OUTPATIENT
Start: 2020-08-16 | End: 2020-10-08 | Stop reason: SDUPTHER

## 2020-08-24 ENCOUNTER — TELEPHONE (OUTPATIENT)
Dept: RADIOLOGY | Facility: HOSPITAL | Age: 65
End: 2020-08-24

## 2020-08-24 ENCOUNTER — NUTRITION (OUTPATIENT)
Dept: NUTRITION | Facility: CLINIC | Age: 65
End: 2020-08-24
Payer: MEDICARE

## 2020-08-24 VITALS — WEIGHT: 256.19 LBS | HEIGHT: 67 IN | BODY MASS INDEX: 40.21 KG/M2

## 2020-08-24 DIAGNOSIS — Z71.3 DIETARY COUNSELING: Primary | ICD-10-CM

## 2020-08-24 DIAGNOSIS — N18.1 CHRONIC KIDNEY DISEASE, STAGE 1: ICD-10-CM

## 2020-08-24 DIAGNOSIS — E66.01 MORBID OBESITY: ICD-10-CM

## 2020-08-24 PROCEDURE — 99212 OFFICE O/P EST SF 10 MIN: CPT | Mod: PBBFAC | Performed by: DIETITIAN, REGISTERED

## 2020-08-24 PROCEDURE — 97802 MEDICAL NUTRITION INDIV IN: CPT | Mod: PBBFAC,59 | Performed by: DIETITIAN, REGISTERED

## 2020-08-24 PROCEDURE — 99999 PR PBB SHADOW E&M-EST. PATIENT-LVL II: ICD-10-PCS | Mod: PBBFAC,,, | Performed by: DIETITIAN, REGISTERED

## 2020-08-24 PROCEDURE — 99999 PR PBB SHADOW E&M-EST. PATIENT-LVL II: CPT | Mod: PBBFAC,,, | Performed by: DIETITIAN, REGISTERED

## 2020-08-24 NOTE — LETTER
August 24, 2020        Oscar Mckeon MD  [address] [place] [address]             [facility]  [address] [facility] [address]  Phone: [phone]  Fax: [phone]   Patient: Casie Gaines   MR Number: [id number]   YOB: 1955   Date of Visit: 8/24/2020       Dear Dr. Mckeon:    Thank you for referring Casie Gaines to me for evaluation. Below are the relevant portions of my assessment and plan of care/ my recommendations to the patient.     1. Follow a balanced diet with a variety of color based mostly from fruits, vegetables, and whole grains. Eat plant based/lean protein as main protein sources. Mimic MyPlate guidelines for meals.     2. Eat every 3-4 hours while awake to aide in metabolism.     3. Limit daily sodium intake to 9442-9916 mg.      4. Limit saturated fats to 20 g or less, trans fat to as close to 0 g as possible daily. Focus on 3-4 servings of unsaturated fat daily.       If you have questions, please do not hesitate to call me. I look forward to following Casie along with you.    Sincerely,      Gerri Meyers, CASSIDY           CC  No Recipients

## 2020-08-24 NOTE — PROGRESS NOTES
"Medical Nutrition Therapy Oncology Progress Note    Name: Casie Gaines MRN: [id number]  : 1955    Age: 64 y.o.  Ethnicity: /Black Language: English    Diagnosis:  Malignant neoplasm of both ovaries    Chemo Regimen: Maintainence Avastin    Referring MD: Dr. Mckeon Frequency: Every 3 weeks Radiation: No           Goal of Cancer treatment Palliative         Nutrition Assessment     Chief Complaint: Hypertensive Diet and Eating healthy post cancer     Anthropometric Measurements:  Height: 5' 7" (1.702 m)  Current Weight: 116.2 kg (256 lb 2.8 oz)  Ideal Body Weight: BMI of 23-27 kg/m2 or 146 lbs-172 lbs  Percent Ideal Body Weight:: 148%  BMI: Body mass index is 40.12 kg/m². obese class II    Weight History:   Wt Readings from Last 8 Encounters:   20 116.2 kg (256 lb 2.8 oz)   20 115.8 kg (255 lb 4.7 oz)   20 117.5 kg (259 lb 0.7 oz)   20 116.5 kg (256 lb 13.4 oz)   20 114.4 kg (252 lb 3.3 oz)   20 113.7 kg (250 lb 10.6 oz)   20 118.5 kg (261 lb 3.9 oz)   20 116.3 kg (256 lb 6.3 oz)        SYMPTOM: COMMENTS:   esophagitis and heartburn Very occasionally experiences burning sensation in throat that began 6 years ago. Began with a very spicy meal she ate with severe heart burn for about 2 days but now only occurs occasionally and is unrelated to cancer.    stomatitis/mucositis Had frequent sores during full chemo treatment, has decreased since being on maintenance dose. Patient reports she still has occasional sores pop up, managed with mouthwash, does not efffect intake.    weight loss Original weight was 262 lbs, weight decreased to 222 lbs over year of chemo, but more accelerated in the last 2 months of treatment, Since initiation of maintenance chemo, weight has increased back to 256 lbs   nausea and fatigue Had nausea with chemo, reports nothing of concern recently  Reports mild fatigue, well menaged with medication     Medical Health " History:  Feeding tube placed: No  Pre-treatment: No    Past Surgical History:   Procedure Laterality Date    breast reduction  10/02/2018     SECTION      X 1    CYSTOSCOPY W/ URETERAL STENT PLACEMENT Right 2019    Procedure: CYSTOSCOPY, WITH URETERAL STENT INSERTION;  Surgeon: Timmy Santiago IV, MD;  Location: [facility] OR;  Service: Urology;  Laterality: Right;    CYSTOSCOPY W/ URETERAL STENT REMOVAL  10/04/2019    DILATION AND CURETTAGE OF UTERUS      HYSTERECTOMY      RALH for fibroids (still has ovaries)    INSERTION OF VENOUS ACCESS PORT Left 2019    Procedure: INSERTION, VENOUS ACCESS PORT;  Surgeon: Ulisses Monzon MD;  Location: [facility] OR;  Service: General;  Laterality: Left;  Left internal jugular     LYSIS OF ADHESIONS OF URETER N/A 8/15/2019    Procedure: URETEROLYSIS;  Surgeon: Ismael Juarez MD;  Location: [facility] OR;  Service: OB/GYN;  Laterality: N/A;    OMENTECTOMY N/A 8/15/2019    Procedure: OMENTECTOMY;  Surgeon: Ismael Juarez MD;  Location: [facility] OR;  Service: OB/GYN;  Laterality: N/A;    RETROGRADE PYELOGRAPHY Right 2019    Procedure: PYELOGRAM, RETROGRADE;  Surgeon: Timmy Santiago IV, MD;  Location: [facility] OR;  Service: Urology;  Laterality: Right;    ROBOT-ASSISTED LAPAROSCOPIC SALPINGO-OOPHORECTOMY USING DA TIA XI Bilateral 8/15/2019    Procedure: XI ROBOTIC SALPINGO-OOPHORECTOMY;  Surgeon: Ismael Juarez MD;  Location: [facility];  Service: OB/GYN;  Laterality: Bilateral;    TOTAL REDUCTION MAMMOPLASTY  2018    TUBAL LIGATION          Medications:   Current Outpatient Medications:     albuterol 90 mcg/actuation inhaler, Inhale 2 puffs into the lungs every 4 (four) hours as needed for Wheezing. Rescue, Disp: 18 g, Rfl: 0    ALPRAZolam (XANAX) 0.25 MG tablet, Take 1 tablet (0.25 mg total) by mouth 3 (three) times daily as needed for Anxiety., Disp: 60 tablet, Rfl: 2    amLODIPine (NORVASC) 5 MG tablet, Take 2 tablets (10 mg total) by mouth once daily., Disp: 60  tablet, Rfl: 11    betamethasone valerate 0.1% (VALISONE) 0.1 % Crea, Apply topically 2 (two) times daily., Disp: 45 g, Rfl: 1    biotin 1 mg tablet, Take 1,000 mcg by mouth once daily. , Disp: , Rfl:     cetirizine (ZYRTEC) 10 MG tablet, Take 1 tablet (10 mg total) by mouth once daily., Disp: 30 tablet, Rfl: 5    diclofenac sodium (VOLTAREN) 1 % Gel, Apply once a day to back as needed, Disp: 100 g, Rfl: 1    diphenhydrAMINE-zinc acetate 1-0.1% (BENADRYL ITCH STOPPING) cream, Apply topically 2 (two) times daily as needed for Itching., Disp: 28 g, Rfl: 0    EPINEPHrine (EPIPEN) 0.3 mg/0.3 mL AtIn, Inject 0.3 mLs (0.3 mg total) into the muscle once. for 1 dose, Disp: 2 each, Rfl: 0    gabapentin (NEURONTIN) 100 MG capsule, Take 1 capsule (100 mg total) by mouth 3 (three) times daily., Disp: 90 capsule, Rfl: 11    hydrOXYzine HCL (ATARAX) 25 MG tablet, Take 1 tablet (25 mg total) by mouth 3 (three) times daily as needed for Itching., Disp: 90 tablet, Rfl: 1    multivitamin with minerals tablet, Take 1 tablet by mouth once daily. , Disp: , Rfl:     olmesartan-hydrochlorothiazide (BENICAR HCT) 40-12.5 mg Tab, Take 1 tablet by mouth once daily., Disp: 90 tablet, Rfl: 0    oxyCODONE-acetaminophen (PERCOCET)  mg per tablet, Take 1 tablet by mouth every 8 (eight) hours as needed., Disp: 20 tablet, Rfl: 0    rosuvastatin (CRESTOR) 10 MG tablet, Take 1 tablet (10 mg total) by mouth once daily., Disp: 90 tablet, Rfl: 1    sertraline (ZOLOFT) 25 MG tablet, Take 1 tablet (25 mg total) by mouth once daily., Disp: 30 tablet, Rfl: 11    triamcinolone acetonide 0.1% (KENALOG) 0.1 % cream, Apply topically 2 (two) times daily., Disp: 15 g, Rfl: 0    zinc gluconate 50 mg tablet, Take 50 mg by mouth once daily., Disp: , Rfl:     Last Labs:  Last Labs:  Glucose   Date Value Ref Range Status   08/13/2020 104 70 - 110 mg/dL Final   07/22/2020 110 70 - 110 mg/dL Final     BUN, Bld   Date Value Ref Range Status    08/13/2020 12 8 - 23 mg/dL Final   07/22/2020 21 8 - 23 mg/dL Final     Creatinine   Date Value Ref Range Status   08/13/2020 0.8 0.5 - 1.4 mg/dL Final   07/22/2020 0.8 0.5 - 1.4 mg/dL Final     Sodium   Date Value Ref Range Status   08/13/2020 144 136 - 145 mmol/L Final   07/22/2020 144 136 - 145 mmol/L Final     Potassium   Date Value Ref Range Status   08/13/2020 3.9 3.5 - 5.1 mmol/L Final   07/22/2020 3.9 3.5 - 5.1 mmol/L Final     Phosphorus   Date Value Ref Range Status   01/27/2019 3.1 2.7 - 4.5 mg/dL Final     Calcium   Date Value Ref Range Status   08/13/2020 9.4 8.7 - 10.5 mg/dL Final   07/22/2020 9.3 8.7 - 10.5 mg/dL Final     No results found for: PREALBUMIN  Total Protein   Date Value Ref Range Status   08/13/2020 6.6 6.0 - 8.4 g/dL Final   07/22/2020 6.4 6.0 - 8.4 g/dL Final     Cholesterol   Date Value Ref Range Status   02/09/2018 171 120 - 199 mg/dL Final     Comment:     The National Cholesterol Education Program (NCEP) has set the  following guidelines (reference ranges) for Cholesterol:  Optimal.....................<200 mg/dL  Borderline High.............200-239 mg/dL  High........................> or = 240 mg/dL       No results found for: HGBA1C  Hemoglobin   Date Value Ref Range Status   08/13/2020 12.3 12.0 - 16.0 g/dL Final   07/22/2020 11.7 (L) 12.0 - 16.0 g/dL Final     Hematocrit   Date Value Ref Range Status   08/13/2020 40.1 37.0 - 48.5 % Final   07/22/2020 38.0 37.0 - 48.5 % Final     No results found for: IRON  No components found for: FROLATE  No results found for: [id number]  WBC   Date Value Ref Range Status   08/13/2020 8.95 3.90 - 12.70 K/uL Final   07/22/2020 8.30 3.90 - 12.70 K/uL Final         Client History/Food Access:    Living Situation: Lives with spouse   Who: Shops for Groceries? Patient and Spouse   Who : Prepares meals? Spouse   Who: Fills prescriptions? Spouse   Are there financial difficulties purchasing food? No   Personal History (occupation, physical activity  level, exercise): Currently watching 5 month grandson, and grandkids frequently.   Is responsible managing the lives of her kids and grandkids, which keeps her busy.   Went to work in February and decided to not return due to COVID. Watches grandkids, and she tries to stay active with them. Began gym membership a  Few weeks ago.  Did bikes, weights, pickle ball and lost 5 pounds.  Would like to make it to the gym at least 3 times per week.      Nutrition-focused Physical Findings:  Reports a tender, touchy bump on tongue that comes and goes, likely related to chemo. No signs and symptoms of deficiency. Patient is well nourished.     Baseline for Outcomes Monitoring  Food and Nutrition History: Experiencing whelps randomly on skin for about 3 months. Not able to assess what is causing the whelps. No throat closing up at this time. Chani working with allergist and derm to asses cause. No food intolerances at this time.   Takes a daily MVI, Zinc, Black [place]. No religous or personal preferences. Reports she sometimes cannot chew crunchy vegetables so she will put whole vegetables in smoothie with fruit and kale. Reports she often wont eat unless she feels hungry or is thinking about it (often will skip). Will often eat a meal consisting of ribs, corn, potato salad, pork n beans with salad. Will often eat a granola bar before [employer] on sundays, but does not always eat in the AM. No stable meal plan or eating pattern. Reports eating based on time and feelings of hunger. But will usually eat something before going to bed. Is aware that she needs to eat healthier most of the time. Tries to use less oil in cooking, but sometimes uses it without worry. Eats a significant amount of avocado. Patient states she has a difficult time controlling the amount of sweets she eats. He  will buy bulk packages of sweets for her and she will eat much of the package at one sitting.     In winter time, she will eat a hot cereal.  "She reprots she ate better when she was working.  Would eat 3 meals a day then due to being on a regular schedule.     24-hour recall:  Breakfast: Peanut Butter Crunchy and Strawberry Jelly [place] + OJ, sometimes will skip   Lunch: Sometimes will skip   Dinner: [employer]  Always: Leftovers    Ideal day:  B: Granola bar or smoothie    D: Ox tail, mustard greens, corn bread, rice gravy    PM Snack: Varies    Fluid: Drinks 36 oz bottle of water daily     Nutritional Needs:  Estimated Needs Method Use    2203-6050 kcal/day [] Predictive Equation: [place]   [x]  14-19 kcal/kg actual body weight   Protein 92-102g 0.8 gm/kg/day   Fluid 3474ml or 116 oz 30ml/kg/day     Food/Nutrient Intake (oral, enteral or parenteral) and Patient Behaviors      Calorie intake: Excessive   Protein intake: Adequate     Yes/No    No Does not use medical food supplements    No Cooking techniques to minimize fatigue   Yes Currently modifying food textures   Yes Able to maintain usual physical actiivty     Nutrition Diagnosis     Nutrition Diagnosis Related to (Etiology) As Evidenced By (Signs/Symptoms)   Predicted excessive energy intake Steady weight gain post cancer treatment Inability/unwillingness to provide a conclusive 24 hour recall/usual intake that would allow for calorie calculation but diet summary indicative of excess intake of energy dense foods.    Excessive mineral intake:  Sodium Hypertension/ Stage 1 CKD  Minimal monitoring of sodium intake prior to today with unmonitored meals out.   Food- and nutrition-related knowledge deficit Healthy diet after cancer and CKD No nutrition education prior to today.        Nutritional Intervention and Prescription        Nutrition Intervention General/healthful diet   Goals/Expected Outcomes Patient is able to build a plate that mimics "MyPlate" handout provided with appropriate portions of appropriate food groups.    Progress Initial     Nutrition Intervention Mineral-modified " diet   Goals/Expected Outcomes Pt to limit daily sodium intake to 8787-6949 mg/day by cooking most meals from scratch, not adding extra salt to food, and making mindful, informed decisions when dining out.   Progress Initial     Nutrition Intervention Nutrition Education  Purpose of nutrition education, Priority modifications, Nutirtion relationship to health/disease and Recommended modifications   Goals/Expected Outcomes Pt to state 2 ways to adopt healthy eating after cancer treatment based on education provided today.    Progress Initial     Pt needs education? yes (see intervention)                  Monitoring and Evaluation     Monitor: diet education needs      Materials Provided:  1. Hypertensive/ Low sodium diet education     3. My Plate + healthy snack list          Recommendations:   1. Follow a balanced diet with a variety of color based mostly from fruits, vegetables, and whole grains. Eat plant based/lean protein as main protein sources. Mimic MyPlate guidelines for meals.    2. Eat every 3-4 hours while awake to aide in metabolism.    3. Limit daily sodium intake to 8638-4946 mg.     4. Limit saturated fats to 20 g or less, trans fat to as close to 0 g as possible daily. Focus on 3-4 servings of unsaturated fat daily.      Total time: 80 minutes    ©2010 [employer] Oncology Toolkit

## 2020-08-28 ENCOUNTER — HOSPITAL ENCOUNTER (EMERGENCY)
Facility: HOSPITAL | Age: 65
Discharge: HOME | End: 2020-08-28
Attending: STUDENT IN AN ORGANIZED HEALTH CARE EDUCATION/TRAINING PROGRAM
Payer: MEDICARE

## 2020-08-28 ENCOUNTER — TELEPHONE (OUTPATIENT)
Dept: FAMILY MEDICINE | Facility: CLINIC | Age: 65
End: 2020-08-28

## 2020-08-28 VITALS
HEART RATE: 88 BPM | BODY MASS INDEX: 40.38 KG/M2 | WEIGHT: 257.25 LBS | HEIGHT: 67 IN | TEMPERATURE: 98 F | OXYGEN SATURATION: 100 % | SYSTOLIC BLOOD PRESSURE: 153 MMHG | RESPIRATION RATE: 17 BRPM | DIASTOLIC BLOOD PRESSURE: 89 MMHG

## 2020-08-28 DIAGNOSIS — M75.82 TENDINITIS OF LEFT ROTATOR CUFF: ICD-10-CM

## 2020-08-28 DIAGNOSIS — M25.512 ACUTE PAIN OF LEFT SHOULDER: Primary | ICD-10-CM

## 2020-08-28 PROCEDURE — 25000003 PHARM REV CODE 250: Performed by: REGISTERED NURSE

## 2020-08-28 PROCEDURE — 99283 EMERGENCY DEPT VISIT LOW MDM: CPT | Mod: 25

## 2020-08-28 RX ORDER — HYDROCODONE BITARTRATE AND ACETAMINOPHEN 5; 325 MG/1; MG/1
1 TABLET ORAL EVERY 6 HOURS PRN
Qty: 12 TABLET | Refills: 0 | OUTPATIENT
Start: 2020-08-28 | End: 2020-09-17

## 2020-08-28 RX ORDER — HYDROCODONE BITARTRATE AND ACETAMINOPHEN 5; 325 MG/1; MG/1
1 TABLET ORAL
Status: COMPLETED | OUTPATIENT
Start: 2020-08-28 | End: 2020-08-28

## 2020-08-28 RX ADMIN — HYDROCODONE BITARTRATE AND ACETAMINOPHEN 1 TABLET: 5; 325 TABLET ORAL at 07:08

## 2020-08-28 NOTE — TELEPHONE ENCOUNTER
----- Message from Kaitlynn Davis sent at 8/28/2020  4:04 PM CDT -----  Contact: REAL Lomeli Is on her way will be there in 20 min

## 2020-08-28 NOTE — ED PROVIDER NOTES
Encounter Date: 2020       History     Chief Complaint   Patient presents with    Shoulder Pain     Staets left shoulder pain for 3 days.  Denies trauma.  Limited ROM.     The history is provided by the patient.   Shoulder Pain  This is a new problem. The current episode started more than 2 days ago. The problem occurs constantly. Pertinent negatives include no chest pain and no shortness of breath.   L shoulder pain for past 3 days. Pt reports pain started after rocking [place] grandbaby in rocking chair. She denies any fever, CP, SOB or any other symptoms    Review of patient's allergies indicates:  No Known Allergies  Past Medical History:   Diagnosis Date    Anemia     Anxiety     Arthritis     knees    Cancer     ovarian    CHF (congestive heart failure)     Hx antineoplastic chemotherapy     last 2019    Hyperlipemia     Hypertension     Neck pain     Ovarian cancer 2019    CHEMO     Past Surgical History:   Procedure Laterality Date    breast reduction  10/02/2018     SECTION      X 1    CYSTOSCOPY W/ URETERAL STENT PLACEMENT Right 2019    Procedure: CYSTOSCOPY, WITH URETERAL STENT INSERTION;  Surgeon: Timmy Santiago IV, MD;  Location: [facility] OR;  Service: Urology;  Laterality: Right;    CYSTOSCOPY W/ URETERAL STENT REMOVAL  10/04/2019    DILATION AND CURETTAGE OF UTERUS      HYSTERECTOMY      RALH for fibroids (still has ovaries)    INSERTION OF VENOUS ACCESS PORT Left 2019    Procedure: INSERTION, VENOUS ACCESS PORT;  Surgeon: Ulisses Monzon MD;  Location: [facility] OR;  Service: General;  Laterality: Left;  Left internal jugular     LYSIS OF ADHESIONS OF URETER N/A 8/15/2019    Procedure: URETEROLYSIS;  Surgeon: Ismael Juarez MD;  Location: [facility] OR;  Service: OB/GYN;  Laterality: N/A;    OMENTECTOMY N/A 8/15/2019    Procedure: OMENTECTOMY;  Surgeon: Ismael Juarez MD;  Location: [facility] OR;  Service: OB/GYN;  Laterality: N/A;    RETROGRADE PYELOGRAPHY Right 2019     Procedure: PYELOGRAM, RETROGRADE;  Surgeon: Timmy Santiago IV, MD;  Location: [facility] OR;  Service: Urology;  Laterality: Right;    ROBOT-ASSISTED LAPAROSCOPIC SALPINGO-OOPHORECTOMY USING DA TIA XI Bilateral 8/15/2019    Procedure: XI ROBOTIC SALPINGO-OOPHORECTOMY;  Surgeon: Ismael Juarez MD;  Location: [facility] OR;  Service: OB/GYN;  Laterality: Bilateral;    TOTAL REDUCTION MAMMOPLASTY  2018    TUBAL LIGATION       Family History   Problem Relation Age of Onset    Anesthesia problems Other     Breast cancer Maternal Aunt     Breast cancer Paternal Aunt     Ovarian cancer Paternal Aunt     Colon cancer Brother     Thrombophilia Neg Hx      Social History     Tobacco Use    Smoking status: Former Smoker     Packs/day: 1.00     Years: 25.00     Pack years: 25.00     Quit date: 10/1/2018     Years since quittin.9    Smokeless tobacco: Never Used    Tobacco comment: States started quit 2 months ago after 30 years   Substance Use Topics    Alcohol use: Never     Alcohol/week: 0.0 standard drinks     Frequency: Never     Comment: occasionally  No alcohol 72h prior to sx    Drug use: No     Review of Systems   Constitutional: Negative for fever.   HENT: Negative for sore throat.    Respiratory: Negative for shortness of breath.    Cardiovascular: Negative for chest pain.   Gastrointestinal: Negative for nausea.   Genitourinary: Negative for dysuria.   Musculoskeletal: Negative for back pain.        + L shoulder pain   Skin: Negative for rash.   Neurological: Negative for weakness.   Hematological: Does not bruise/bleed easily.   All other systems reviewed and are negative.      Physical Exam     Initial Vitals [20 1655]   BP Pulse Resp Temp SpO2   139/72 110 18 98.3 °F (36.8 °C) 95 %      MAP       --         Physical Exam    Constitutional: She appears well-developed and well-nourished. She is not diaphoretic. No distress.   HENT:   Head: Normocephalic and atraumatic.   Eyes: Conjunctivae and EOM  are normal. Pupils are equal, round, and reactive to light.   Neck: Normal range of motion. Neck supple.   Cardiovascular: Normal rate, regular rhythm and normal heart sounds.   No murmur heard.  Pulmonary/Chest: Breath sounds normal. No respiratory distress. She has no wheezes. She has no rales.   Abdominal: Soft. Bowel sounds are normal. There is no abdominal tenderness. There is no rebound and no guarding.   Musculoskeletal: No edema.      Left shoulder: She exhibits decreased range of motion and tenderness. She exhibits no bony tenderness and no deformity.      Comments: L anterior shoulder tenderness, pain with abduction and rotation   Neurological: She is alert and oriented to person, place, and time. No cranial nerve deficit. GCS score is 15. GCS eye subscore is 4. GCS verbal subscore is 5. GCS motor subscore is 6.   Skin: Skin is warm and dry. Capillary refill takes less than 2 seconds.   Psychiatric: She has a normal mood and affect. Thought content normal.         ED Course   Procedures  Labs Reviewed - No data to display       Imaging Results          X-Ray Shoulder 2 or More Views Left (Final result)  Result time 08/28/20 18:37:15    Final result by Zay Scott MD (08/28/20 18:37:15)                 Impression:      No acute abnormality.      Electronically signed by: Zay Scott  Date:    08/28/2020  Time:    18:37             Narrative:    EXAMINATION:  XR SHOULDER COMPLETE 2 OR MORE VIEWS LEFT    CLINICAL HISTORY:  left shoulder pain;    TECHNIQUE:  Two or three views of the left shoulder were performed.    COMPARISON:  None    FINDINGS:  No fracture.  No dislocation.  There is relatively mild degenerative change.  Soft tissues appear intact.  Left chest wall port catheter in place.                                      I discussed with patient and/or family/caretaker that negative X-ray does not rule out occult fracture or other soft tissue injury.  Persistent pain greater than 7-10 days or  increased pain requires follow up, specifically with orthopedics.                             Clinical Impression:       ICD-10-CM ICD-9-CM   1. Acute pain of left shoulder  M25.512 719.41   2. Tendinitis of left rotator cuff  M75.82 726.10             ED Disposition Condition    Discharge Stable        ED Prescriptions     Medication Sig Dispense Start Date End Date Auth. Provider    HYDROcodone-acetaminophen (NORCO) 5-325 mg per tablet Take 1 tablet by mouth every 6 (six) hours as needed. 12 tablet 8/28/2020  TRUPTI Terrell Jr.        Follow-up Information     Follow up With Specialties Details Why Contact Info    Rossana Ang MD Family Medicine In 3 days  [id number] [place] [id number] [phone]                                       TRUPTI Terrell Jr.  08/28/20 1956

## 2020-08-28 NOTE — TELEPHONE ENCOUNTER
Pt called in and she stated shes going to be 20 minutes she will be here at 4:35. Her appt was at 4. I went and asked  And pt is a new patient. So I reshducled her.

## 2020-08-30 ENCOUNTER — HOSPITAL ENCOUNTER (EMERGENCY)
Facility: HOSPITAL | Age: 65
Discharge: HOME | End: 2020-08-30
Attending: EMERGENCY MEDICINE
Payer: MEDICARE

## 2020-08-30 VITALS
HEART RATE: 84 BPM | WEIGHT: 260.5 LBS | TEMPERATURE: 98 F | RESPIRATION RATE: 20 BRPM | DIASTOLIC BLOOD PRESSURE: 80 MMHG | HEIGHT: 67 IN | BODY MASS INDEX: 40.89 KG/M2 | OXYGEN SATURATION: 95 % | SYSTOLIC BLOOD PRESSURE: 138 MMHG

## 2020-08-30 DIAGNOSIS — M75.52 BURSITIS OF LEFT SHOULDER: ICD-10-CM

## 2020-08-30 DIAGNOSIS — M25.512 ACUTE PAIN OF LEFT SHOULDER: Primary | ICD-10-CM

## 2020-08-30 PROCEDURE — 63600175 PHARM REV CODE 636 W HCPCS: Performed by: EMERGENCY MEDICINE

## 2020-08-30 PROCEDURE — 99283 EMERGENCY DEPT VISIT LOW MDM: CPT

## 2020-08-30 RX ORDER — DEXAMETHASONE SODIUM PHOSPHATE 4 MG/ML
4 INJECTION, SOLUTION INTRA-ARTICULAR; INTRALESIONAL; INTRAMUSCULAR; INTRAVENOUS; SOFT TISSUE
Status: COMPLETED | OUTPATIENT
Start: 2020-08-30 | End: 2020-08-30

## 2020-08-30 RX ORDER — KETOROLAC TROMETHAMINE 30 MG/ML
30 INJECTION, SOLUTION INTRAMUSCULAR; INTRAVENOUS
Status: COMPLETED | OUTPATIENT
Start: 2020-08-30 | End: 2020-08-30

## 2020-08-30 RX ORDER — METHYLPREDNISOLONE 4 MG/1
TABLET ORAL
Qty: 1 PACKAGE | Refills: 0 | Status: SHIPPED | OUTPATIENT
Start: 2020-08-30 | End: 2020-09-20

## 2020-08-30 RX ADMIN — DEXAMETHASONE SODIUM PHOSPHATE 4 MG: 4 INJECTION, SOLUTION INTRAMUSCULAR; INTRAVENOUS at 01:08

## 2020-08-30 RX ADMIN — KETOROLAC TROMETHAMINE 30 MG: 30 INJECTION, SOLUTION INTRAMUSCULAR; INTRAVENOUS at 01:08

## 2020-08-30 NOTE — ED PROVIDER NOTES
SCRIBE #1 NOTE: I, Herlinda Shukla, am scribing for, and in the presence of, Tanner Lebron Jr., MD. I have scribed the entire note.       History     Chief Complaint   Patient presents with    Shoulder Pain     left shoulder pain, was seen here yesterday for same problem, states she woke up in excrutiating pain     Review of patient's allergies indicates:  No Known Allergies      History of Present Illness     HPI    2020, 12:46 PM  History obtained from the patient      History of Present Illness: Casie Gaines is a 64 y.o. female patient with a h/o anemia, anxiety, ovarian cancer, CHF, HLD, HTN, neck pain, who presents to the Emergency Department for evaluation of L shoulder pain which onset 5 days ago. Pt states that she woke up in excruciating pain. She reports that she held her 5 month old grandson for a few hours the other day and started a new workout program last week. Pt was seen here on 20 for L shoulder pain and then discharged. Symptoms are constant and moderate in severity. No associated sxs reported. Patient denies any trauma, injury, recent fall, back pain, neck pain, CP, SOB, nausea, emesis, fever, chills, HA, and all other sxs at this time. Prior tx includes Tylenol and hydrocodone with minimal relief in sxs. No further complaints or concerns at this time.       Arrival mode: Personal transportation      PCP: Rossana Ang MD      Past Medical History:  Past Medical History:   Diagnosis Date    Anemia     Anxiety     Arthritis     knees    Cancer     ovarian    CHF (congestive heart failure)     Hx antineoplastic chemotherapy     last 2019    Hyperlipemia     Hypertension     Neck pain     Ovarian cancer 2019    CHEMO       Past Surgical History:  Past Surgical History:   Procedure Laterality Date    breast reduction  10/02/2018     SECTION      X 1    CYSTOSCOPY W/ URETERAL STENT PLACEMENT Right 2019    Procedure: CYSTOSCOPY, WITH URETERAL STENT  INSERTION;  Surgeon: Timmy Santiago IV, MD;  Location: [facility] OR;  Service: Urology;  Laterality: Right;    CYSTOSCOPY W/ URETERAL STENT REMOVAL  10/04/2019    DILATION AND CURETTAGE OF UTERUS      HYSTERECTOMY      RALH for fibroids (still has ovaries)    INSERTION OF VENOUS ACCESS PORT Left 2019    Procedure: INSERTION, VENOUS ACCESS PORT;  Surgeon: Ulisses Monzon MD;  Location: [facility] OR;  Service: General;  Laterality: Left;  Left internal jugular     LYSIS OF ADHESIONS OF URETER N/A 8/15/2019    Procedure: URETEROLYSIS;  Surgeon: Ismael Juarez MD;  Location: [facility] OR;  Service: OB/GYN;  Laterality: N/A;    OMENTECTOMY N/A 8/15/2019    Procedure: OMENTECTOMY;  Surgeon: Ismael Juarez MD;  Location: [facility];  Service: OB/GYN;  Laterality: N/A;    RETROGRADE PYELOGRAPHY Right 2019    Procedure: PYELOGRAM, RETROGRADE;  Surgeon: Timmy Santiago IV, MD;  Location: [facility] OR;  Service: Urology;  Laterality: Right;    ROBOT-ASSISTED LAPAROSCOPIC SALPINGO-OOPHORECTOMY USING DA TIA XI Bilateral 8/15/2019    Procedure: XI ROBOTIC SALPINGO-OOPHORECTOMY;  Surgeon: Ismael Juarez MD;  Location: [facility];  Service: OB/GYN;  Laterality: Bilateral;    TOTAL REDUCTION MAMMOPLASTY  2018    TUBAL LIGATION           Family History:  Family History   Problem Relation Age of Onset    Anesthesia problems Other     Breast cancer Maternal Aunt     Breast cancer Paternal Aunt     Ovarian cancer Paternal Aunt     Colon cancer Brother     Thrombophilia Neg Hx        Social History:   Social History     Tobacco Use    Smoking status: Former Smoker     Packs/day: 1.00     Years: 25.00     Pack years: 25.00     Quit date: 10/1/2018     Years since quittin.9    Smokeless tobacco: Never Used    Tobacco comment: States started quit 2 months ago after 30 years   Substance and Sexual Activity    Alcohol use: Never     Alcohol/week: 0.0 standard drinks     Frequency: Never     Comment: occasionally  No alcohol 72h  prior to sx    Drug use: No    Sexual activity: Not Currently     Partners: Male     Comment: hyst; mut monog        Review of Systems     Review of Systems   Constitutional: Negative for chills and fever.   HENT: Negative for sore throat.    Respiratory: Negative for shortness of breath.    Cardiovascular: Negative for chest pain.   Gastrointestinal: Negative for nausea and vomiting.   Genitourinary: Negative for dysuria.   Musculoskeletal: Negative for back pain and neck pain.        (+) L shoulder pain   Skin: Negative for rash.   Neurological: Negative for weakness and headaches.   Hematological: Does not bruise/bleed easily.   All other systems reviewed and are negative.       Physical Exam     Initial Vitals [08/30/20 1240]   BP Pulse Resp Temp SpO2   138/80 84 20 98.2 °F (36.8 °C) 95 %      MAP       --          Physical Exam  Nursing Notes and Vital Signs Reviewed.  Constitutional: Well-developed and well-nourished.   Head: Atraumatic. Normocephalic.  Eyes: EOM intact. No scleral icterus.  ENT: Mucous membranes are moist. Oropharynx is clear and symmetric.    Neck: Supple. Full ROM. No lymphadenopathy.  Cardiovascular: Regular rate. Regular rhythm. No murmurs, rubs, or gallops. Distal pulses are 2+ and symmetric.  Pulmonary/Chest: No respiratory distress. Clear to auscultation bilaterally. No wheezing or rales.  Abdominal: Soft and non-distended.  There is no tenderness.  No rebound, guarding, or rigidity.  Genitourinary: No CVA tenderness  Musculoskeletal: No obvious deformities.   LUE: has no evident deformity. Exquisitely tender over bursa. Cap refill distally is <2 seconds. Radial pulses are equal and 2+ bilaterally. No motor deficit. No distal sensory deficit. NVI distally.   Skin: Warm and dry.  Neurological:  Alert, awake, and appropriate.  Normal speech.  No acute focal neurological deficits are appreciated.  Psychiatric: Normal affect. Good eye contact. Appropriate in content.     ED Course  "  Procedures  ED Vital Signs:  Vitals:    08/30/20 1240   BP: 138/80   Pulse: 84   Resp: 20   Temp: 98.2 °F (36.8 °C)   TempSrc: Oral   SpO2: 95%   Weight: 118.2 kg (260 lb 7.6 oz)   Height: 5' 7" (1.702 m)       Abnormal Lab Results:  Labs Reviewed - No data to display         The Emergency Provider reviewed the vital signs and test results, which are outlined above.     ED Discussion       1:23 PM: Reassessed pt at this time.  Pt states her condition has improved at this time. Discussed with pt all pertinent ED information and results. Discussed pt dx and plan of tx. Gave pt all f/u and return to the ED instructions. All questions and concerns were addressed at this time. Pt expresses understanding of information and instructions, and is comfortable with plan to discharge. Pt is stable for discharge.    I discussed with patient and/or family/caretaker that evaluation in the ED does not suggest any emergent or life threatening medical conditions requiring immediate intervention beyond what was provided in the ED, and I believe patient is safe for discharge.  Regardless, an unremarkable evaluation in the ED does not preclude the development or presence of a serious of life threatening condition. As such, patient was instructed to return immediately for any worsening or change in current symptoms.       [facility]                  ED Medication(s):  Medications   dexamethasone injection 4 mg (has no administration in time range)   ketorolac injection 30 mg (has no administration in time range)       New Prescriptions    METHYLPREDNISOLONE (MEDROL DOSEPACK) 4 MG TABLET    Per package       Follow-up Information     Rossana Ang MD. Call in 2 days.    Specialty: Family Medicine  Contact information:  [id number] [facility] [address] [phone]                       Scribe Attestation:   Scribe #1: I performed the above scribed service and the documentation accurately describes the services I performed. " I attest to the accuracy of the note.     Attending:   Physician Attestation Statement for Scribe #1: I, Tanner Lebron Jr., MD, personally performed the services described in this documentation, as scribed by Herlinda Shukla, in my presence, and it is both accurate and complete.           Clinical Impression       ICD-10-CM ICD-9-CM   1. Acute pain of left shoulder  M25.512 719.41   2. Bursitis of left shoulder  M75.52 726.10       Disposition:   Disposition: Discharged  Condition: Stable         Tanner Lebron Jr., MD  08/30/20 0007

## 2020-09-02 NOTE — PROGRESS NOTES
Subjective:       Patient ID: Casie Gaines is a 64 y.o. female.    Chief Complaint: Peritoneal carcinomatosis [C78.6, C80.1]  HPI: We have an opportunity to see Ms. Casie Gaines in Hematology Oncology clinic at [facility] on 09/02/2020.  Ms. Casie Gaines is a 64 y.o. woman with peritoneal carcinomatosis with malignant right pleural effusion.  Final pathology is consistent with ovarian primary with  2740.  PET scan demonstrates multiple omental and peritoneal avid masses consistent with peritoneal carcinomatosis, extensive retroperitoneal and mediastinal lymphadenopathy, masslike structure in the right iliac fossa likely representing ovarian mass.  Patient has been evaluated by GYN Oncology Dr. Juarez and MD Green with recommendations for him carboplatin/Taxol/Avastin.      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  S/p 6 cycle avastin taxol carboplatin.  Has great ND after 6 cycles. On 8/15/2019 underwent salpingoophorectomy. Pathology showed CR. Currently on maintenance avastin.       Oncology History   Peritoneal carcinomatosis   1/26/2019 Initial Diagnosis    Peritoneal carcinomatosis     2/15/2019 - 7/8/2019 Chemotherapy    Treatment Summary   Plan Name: OP GYN PACLITAXEL CARBOPLATIN (AUC 6) Q3W  Treatment Goal: Palliative  Status: Inactive  Start Date: 2/28/2019  End Date: 6/18/2019  Provider: Will Trevizo Jr., MD  Chemotherapy: bevacizumab (AVASTIN) 15 mg/kg = 1,600 mg in sodium chloride 0.9% 100 mL chemo infusion, 15 mg/kg = 1,600 mg (100 % of original dose 15 mg/kg), Intravenous, Clinic/HOD 1 time, 6 of 6 cycles  Dose modification: 15 mg/kg (original dose 15 mg/kg, Cycle 1)  Administration: 1,600 mg (2/28/2019), 1,600 mg (3/21/2019), 1,600 mg (4/11/2019), 1,600 mg (5/7/2019), 1,600 mg (5/28/2019), 1,510 mg (6/18/2019)  CARBOplatin (PARAPLATIN) 870 mg in sodium chloride 0.9% 337 mL chemo infusion, 870 mg (100 % of original dose 868.8 mg),  Intravenous, Clinic/HOD 1 time, 6 of 6 cycles  Dose modification:   (original dose 868.8 mg, Cycle 1)  Administration: 870 mg (2/28/2019), 870 mg (3/21/2019), 900 mg (4/11/2019), 870 mg (5/7/2019), 660 mg (5/28/2019), 690 mg (6/18/2019)  PACLitaxel (TAXOL) 175 mg/m2 = 396 mg in sodium chloride 0.9% 566 mL chemo infusion, 175 mg/m2 = 396 mg, Intravenous, Clinic/HOD 1 time, 6 of 6 cycles  Dose modification: 140 mg/m2 (80 % of original dose 175 mg/m2, Cycle 5)  Administration: 396 mg (2/28/2019), 396 mg (3/21/2019), 396 mg (4/11/2019), 396 mg (5/7/2019), 318 mg (5/28/2019), 306 mg (6/18/2019)     10/9/2019 -  Chemotherapy    Treatment Summary   Plan Name: OP BEVACIZUMAB Q3W   Treatment Goal: Maintenance  Status: Active  Start Date: 10/9/2019  End Date: 1/28/2021 (Planned)  Provider: Oscar Mckeon MD  Chemotherapy: bevacizumab (AVASTIN) 15 mg/kg = 1,615 mg in sodium chloride 0.9% 100 mL chemo infusion, 15 mg/kg = 1,615 mg, Intravenous, Clinic/HOD 1 time, 9 of 17 cycles  Administration: 1,615 mg (10/9/2019), 1,615 mg (10/29/2019), 1,615 mg (12/10/2019), 1,615 mg (1/21/2020), 1,600 mg (4/2/2020), 1,615 mg (4/23/2020), 1,600 mg (7/1/2020), 1,600 mg (7/22/2020), 1,600 mg (8/13/2020)     Malignant neoplasm of right ovary   2/15/2019 Initial Diagnosis    Malignant neoplasm of right ovary     2/15/2019 - 7/8/2019 Chemotherapy    Treatment Summary   Plan Name: OP GYN PACLITAXEL CARBOPLATIN (AUC 6) Q3W  Treatment Goal: Palliative  Status: Inactive  Start Date: 2/28/2019  End Date: 6/18/2019  Provider: Will Trevizo Jr., MD  Chemotherapy: bevacizumab (AVASTIN) 15 mg/kg = 1,600 mg in sodium chloride 0.9% 100 mL chemo infusion, 15 mg/kg = 1,600 mg (100 % of original dose 15 mg/kg), Intravenous, Clinic/[facility] 1 time, 6 of 6 cycles  Dose modification: 15 mg/kg (original dose 15 mg/kg, Cycle 1)  Administration: 1,600 mg (2/28/2019), 1,600 mg (3/21/2019), 1,600 mg (4/11/2019), 1,600 mg (5/7/2019), 1,600 mg (5/28/2019), 1,510 mg  (6/18/2019)  CARBOplatin (PARAPLATIN) 870 mg in sodium chloride 0.9% 337 mL chemo infusion, 870 mg (100 % of original dose 868.8 mg), Intravenous, Clinic/HOD 1 time, 6 of 6 cycles  Dose modification:   (original dose 868.8 mg, Cycle 1)  Administration: 870 mg (2/28/2019), 870 mg (3/21/2019), 900 mg (4/11/2019), 870 mg (5/7/2019), 660 mg (5/28/2019), 690 mg (6/18/2019)  PACLitaxel (TAXOL) 175 mg/m2 = 396 mg in sodium chloride 0.9% 566 mL chemo infusion, 175 mg/m2 = 396 mg, Intravenous, Clinic/HOD 1 time, 6 of 6 cycles  Dose modification: 140 mg/m2 (80 % of original dose 175 mg/m2, Cycle 5)  Administration: 396 mg (2/28/2019), 396 mg (3/21/2019), 396 mg (4/11/2019), 396 mg (5/7/2019), 318 mg (5/28/2019), 306 mg (6/18/2019)     10/9/2019 -  Chemotherapy    Treatment Summary   Plan Name: OP BEVACIZUMAB Q3W   Treatment Goal: Maintenance  Status: Active  Start Date: 10/9/2019  End Date: 12/21/2020 (Planned)  Provider: Oscar Mckeon MD  Chemotherapy: bevacizumab (AVASTIN) 15 mg/kg = 1,615 mg in sodium chloride 0.9% 100 mL chemo infusion, 15 mg/kg = 1,615 mg, Intravenous, Clinic/HOD 1 time, 6 of 17 cycles  Administration: 1,615 mg (10/9/2019), 1,615 mg (10/29/2019), 1,615 mg (12/10/2019), 1,615 mg (1/21/2020), 1,600 mg (4/2/2020), 1,615 mg (4/23/2020)     Malignant neoplasm of both ovaries   2/18/2019 Initial Diagnosis    Ovarian cancer     10/9/2019 -  Chemotherapy    Treatment Summary   Plan Name: OP BEVACIZUMAB Q3W   Treatment Goal: Maintenance  Status: Active  Start Date: 10/9/2019  End Date: 12/21/2020 (Planned)  Provider: Oscar Mckeon MD  Chemotherapy: bevacizumab (AVASTIN) 15 mg/kg = 1,615 mg in sodium chloride 0.9% 100 mL chemo infusion, 15 mg/kg = 1,615 mg, Intravenous, [facility] 1 time, 6 of 17 cycles  Administration: 1,615 mg (10/9/2019), 1,615 mg (10/29/2019), 1,615 mg (12/10/2019), 1,615 mg (1/21/2020), 1,600 mg (4/2/2020), 1,615 mg (4/23/2020)     Ovarian cancer, bilateral   8/15/2019 Initial Diagnosis     Ovarian cancer, bilateral     10/9/2019 -  Chemotherapy    Treatment Summary   Plan Name: OP BEVACIZUMAB Q3W   Treatment Goal: Maintenance  Status: Active  Start Date: 10/9/2019  End Date: 2020 (Planned)  Provider: Oscar Mckeon MD  Chemotherapy: bevacizumab (AVASTIN) 15 mg/kg = 1,615 mg in sodium chloride 0.9% 100 mL chemo infusion, 15 mg/kg = 1,615 mg, Intravenous, Clinic/HOD 1 time, 6 of 17 cycles  Administration: 1,615 mg (10/9/2019), 1,615 mg (10/29/2019), 1,615 mg (12/10/2019), 1,615 mg (2020), 1,600 mg (2020), 1,615 mg (2020)       Past Medical History:   Diagnosis Date    Anemia     Anxiety     Arthritis     knees    Cancer     ovarian    CHF (congestive heart failure)     Hx antineoplastic chemotherapy     last 2019    Hyperlipemia     Hypertension     Neck pain     Ovarian cancer     CHEMO     Family History   Problem Relation Age of Onset    Anesthesia problems Other     Breast cancer Maternal Aunt     Breast cancer Paternal Aunt     Ovarian cancer Paternal Aunt     Colon cancer Brother     Thrombophilia Neg Hx      Social History     Socioeconomic History    Marital status:      Spouse name: Not on file    Number of children: Not on file    Years of education: Not on file    Highest education level: Not on file   Occupational History    Not on file   Social Needs    Financial resource strain: Not on file    Food insecurity     Worry: Not on file     Inability: Not on file    Transportation needs     Medical: Not on file     Non-medical: Not on file   Tobacco Use    Smoking status: Former Smoker     Packs/day: 1.00     Years: 25.00     Pack years: 25.00     Quit date: 10/1/2018     Years since quittin.9    Smokeless tobacco: Never Used    Tobacco comment: States started quit 2 months ago after 30 years   Substance and Sexual Activity    Alcohol use: Never     Alcohol/week: 0.0 standard drinks     Frequency: Never     Comment: occasionally   No alcohol 72h prior to sx    Drug use: No    Sexual activity: Not Currently     Partners: Male     Comment: hyst; mut monog   Lifestyle    Physical activity     Days per week: Not on file     Minutes per session: Not on file    Stress: Only a little   Relationships    Social connections     Talks on phone: Not on file     Gets together: Not on file     Attends Sikhism service: Not on file     Active member of club or organization: Not on file     Attends meetings of clubs or organizations: Not on file     Relationship status: Not on file   Other Topics Concern    Not on file   Social History Narrative    Live w/ spouse and 2 dogs      Past Surgical History:   Procedure Laterality Date    breast reduction  10/02/2018     SECTION      X 1    CYSTOSCOPY W/ URETERAL STENT PLACEMENT Right 2019    Procedure: CYSTOSCOPY, WITH URETERAL STENT INSERTION;  Surgeon: Timmy Santiago IV, MD;  Location: [facility];  Service: Urology;  Laterality: Right;    CYSTOSCOPY W/ URETERAL STENT REMOVAL  10/04/2019    DILATION AND CURETTAGE OF UTERUS      HYSTERECTOMY      RALH for fibroids (still has ovaries)    INSERTION OF VENOUS ACCESS PORT Left 2019    Procedure: INSERTION, VENOUS ACCESS PORT;  Surgeon: Ulisses Monzon MD;  Location: [facility] OR;  Service: General;  Laterality: Left;  Left internal jugular     LYSIS OF ADHESIONS OF URETER N/A 8/15/2019    Procedure: URETEROLYSIS;  Surgeon: Ismael Juarez MD;  Location: [facility] OR;  Service: OB/GYN;  Laterality: N/A;    OMENTECTOMY N/A 8/15/2019    Procedure: OMENTECTOMY;  Surgeon: Ismael Juarez MD;  Location: [facility] OR;  Service: OB/GYN;  Laterality: N/A;    RETROGRADE PYELOGRAPHY Right 2019    Procedure: PYELOGRAM, RETROGRADE;  Surgeon: Timmy Santiago IV, MD;  Location: [facility] OR;  Service: Urology;  Laterality: Right;    ROBOT-ASSISTED LAPAROSCOPIC SALPINGO-OOPHORECTOMY USING DA TIA XI Bilateral 8/15/2019    Procedure: XI ROBOTIC  SALPINGO-OOPHORECTOMY;  Surgeon: Ismael Juarez MD;  [facility];  Service: OB/GYN;  Laterality: Bilateral;    TOTAL REDUCTION MAMMOPLASTY  2018    TUBAL LIGATION       Current Outpatient Medications   Medication Sig Dispense Refill    albuterol 90 mcg/actuation inhaler Inhale 2 puffs into the lungs every 4 (four) hours as needed for Wheezing. Rescue 18 g 0    ALPRAZolam (XANAX) 0.25 MG tablet Take 1 tablet (0.25 mg total) by mouth 3 (three) times daily as needed for Anxiety. 60 tablet 2    amLODIPine (NORVASC) 5 MG tablet Take 2 tablets (10 mg total) by mouth once daily. 60 tablet 11    betamethasone valerate 0.1% (VALISONE) 0.1 % Crea Apply topically 2 (two) times daily. 45 g 1    biotin 1 mg tablet Take 1,000 mcg by mouth once daily.       cetirizine (ZYRTEC) 10 MG tablet Take 1 tablet (10 mg total) by mouth once daily. 30 tablet 5    diclofenac sodium (VOLTAREN) 1 % Gel Apply once a day to back as needed 100 g 1    diphenhydrAMINE-zinc acetate 1-0.1% (BENADRYL ITCH STOPPING) cream Apply topically 2 (two) times daily as needed for Itching. 28 g 0    EPINEPHrine (EPIPEN) 0.3 mg/0.3 mL AtIn Inject 0.3 mLs (0.3 mg total) into the muscle once. for 1 dose 2 each 0    gabapentin (NEURONTIN) 100 MG capsule Take 1 capsule (100 mg total) by mouth 3 (three) times daily. 90 capsule 11    HYDROcodone-acetaminophen (NORCO) 5-325 mg per tablet Take 1 tablet by mouth every 6 (six) hours as needed. 12 tablet 0    hydrOXYzine HCL (ATARAX) 25 MG tablet Take 1 tablet (25 mg total) by mouth 3 (three) times daily as needed for Itching. 90 tablet 1    methylPREDNISolone (MEDROL DOSEPACK) 4 mg tablet Per package 1 Package 0    multivitamin with minerals tablet Take 1 tablet by mouth once daily.       olmesartan-hydrochlorothiazide (BENICAR HCT) 40-12.5 mg Tab Take 1 tablet by mouth once daily. 90 tablet 0    oxyCODONE-acetaminophen (PERCOCET)  mg per tablet Take 1 tablet by mouth every 8 (eight) hours as  needed. 20 tablet 0    rosuvastatin (CRESTOR) 10 MG tablet Take 1 tablet (10 mg total) by mouth once daily. 90 tablet 1    sertraline (ZOLOFT) 25 MG tablet Take 1 tablet (25 mg total) by mouth once daily. 30 tablet 11    triamcinolone acetonide 0.1% (KENALOG) 0.1 % cream Apply topically 2 (two) times daily. 15 g 0    zinc gluconate 50 mg tablet Take 50 mg by mouth once daily.       No current facility-administered medications for this visit.        Labs:  Lab Results   Component Value Date    WBC 8.95 08/13/2020    HGB 12.3 08/13/2020    HCT 40.1 08/13/2020    MCV 92 08/13/2020     08/13/2020     BMP  Lab Results   Component Value Date     08/13/2020    K 3.9 08/13/2020     08/13/2020    CO2 26 08/13/2020    BUN 12 08/13/2020    CREATININE 0.8 08/13/2020    CALCIUM 9.4 08/13/2020    ANIONGAP 10 08/13/2020    ESTGFRAFRICA >60 08/13/2020    EGFRNONAA >60 08/13/2020     Lab Results   Component Value Date    ALT 23 08/13/2020    AST 18 08/13/2020    ALKPHOS 114 08/13/2020    BILITOT 0.4 08/13/2020       No results found for: IRON, TIBC, FERRITIN, SATURATEDIRO  No results found for: OCKMIXWB76  No results found for: FOLATE  Lab Results   Component Value Date    TSH 1.869 04/19/2013       I have reviewed the radiology reports and examined the scan/xray images.    Review of Systems   Constitutional: Negative.    HENT: Negative.    Eyes: Negative.    Respiratory: Negative.    Cardiovascular: Negative.    Gastrointestinal: Negative.    Endocrine: Negative.    Genitourinary: Negative.    Musculoskeletal: Negative.    Skin: Negative.    Allergic/Immunologic: Negative.    Neurological: Negative.    Hematological: Negative.    Psychiatric/Behavioral: Negative.      ECOG SCORE    0 - Fully active-able to carry on all pre-disease performance without restriction            Objective:     Vitals:    09/03/20 1036   BP: 128/79   Pulse: 70   Resp: 14   Temp: 97.5 °F (36.4 °C)   Body mass index is 41.33  kg/m².  Physical Exam  Vitals signs and nursing note reviewed.   Constitutional:       Appearance: She is well-developed.   HENT:      Head: Normocephalic and atraumatic.   Eyes:      Conjunctiva/sclera: Conjunctivae normal.   Neck:      Musculoskeletal: Normal range of motion and neck supple.   Cardiovascular:      Rate and Rhythm: Normal rate and regular rhythm.   Pulmonary:      Effort: Pulmonary effort is normal.      Breath sounds: Normal breath sounds.   Abdominal:      General: Bowel sounds are normal.      Palpations: Abdomen is soft.   Musculoskeletal: Normal range of motion.   Skin:     General: Skin is warm and dry.   Neurological:      Mental Status: She is alert and oriented to person, place, and time.   Psychiatric:         Behavior: Behavior normal.         Thought Content: Thought content normal.         Judgment: Judgment normal.           Assessment:      1. Peritoneal carcinomatosis    2. Malignant neoplasm of right ovary    3. Ovarian cancer, bilateral    4. Bursitis of left shoulder           Plan:     Peritoneal carcinomatosis  Continue on maintenance avastin  -     CBC auto differential; Future; Expected date: 09/03/2020  -     Comprehensive metabolic panel; Future; Expected date: 09/03/2020  -     Urinalysis; Future; Expected date: 09/03/2020    Malignant neoplasm of right ovary    Ovarian cancer, bilateral    Bursitis of left shoulder  -     Ambulatory referral/consult to Orthopedics; Future; Expected date: 09/03/2020

## 2020-09-03 ENCOUNTER — INFUSION (OUTPATIENT)
Dept: INFUSION THERAPY | Facility: HOSPITAL | Age: 65
End: 2020-09-03
Attending: INTERNAL MEDICINE
Payer: MEDICARE

## 2020-09-03 ENCOUNTER — PATIENT OUTREACH (OUTPATIENT)
Dept: OTHER | Facility: OTHER | Age: 65
End: 2020-09-03

## 2020-09-03 ENCOUNTER — LAB VISIT (OUTPATIENT)
Dept: LAB | Facility: HOSPITAL | Age: 65
End: 2020-09-03
Attending: INTERNAL MEDICINE
Payer: MEDICARE

## 2020-09-03 ENCOUNTER — OFFICE VISIT (OUTPATIENT)
Dept: HEMATOLOGY/ONCOLOGY | Facility: CLINIC | Age: 65
End: 2020-09-03
Payer: MEDICARE

## 2020-09-03 VITALS
DIASTOLIC BLOOD PRESSURE: 79 MMHG | HEART RATE: 70 BPM | OXYGEN SATURATION: 98 % | BODY MASS INDEX: 41.42 KG/M2 | RESPIRATION RATE: 14 BRPM | TEMPERATURE: 98 F | WEIGHT: 263.88 LBS | SYSTOLIC BLOOD PRESSURE: 128 MMHG | HEIGHT: 67 IN

## 2020-09-03 VITALS
DIASTOLIC BLOOD PRESSURE: 86 MMHG | RESPIRATION RATE: 16 BRPM | HEART RATE: 72 BPM | TEMPERATURE: 98 F | OXYGEN SATURATION: 97 % | SYSTOLIC BLOOD PRESSURE: 139 MMHG

## 2020-09-03 DIAGNOSIS — C56.1 MALIGNANT NEOPLASM OF RIGHT OVARY: ICD-10-CM

## 2020-09-03 DIAGNOSIS — M75.52 BURSITIS OF LEFT SHOULDER: ICD-10-CM

## 2020-09-03 DIAGNOSIS — C78.6 PERITONEAL CARCINOMATOSIS: Primary | ICD-10-CM

## 2020-09-03 DIAGNOSIS — C56.3 OVARIAN CANCER, BILATERAL: Primary | ICD-10-CM

## 2020-09-03 DIAGNOSIS — C56.3 OVARIAN CANCER, BILATERAL: ICD-10-CM

## 2020-09-03 DIAGNOSIS — C78.6 PERITONEAL CARCINOMATOSIS: ICD-10-CM

## 2020-09-03 DIAGNOSIS — C56.3 MALIGNANT NEOPLASM OF BOTH OVARIES: ICD-10-CM

## 2020-09-03 LAB
BACTERIA #/AREA URNS HPF: ABNORMAL /HPF
BILIRUB UR QL STRIP: NEGATIVE
CLARITY UR: CLEAR
COLOR UR: YELLOW
GLUCOSE UR QL STRIP: NEGATIVE
HGB UR QL STRIP: NEGATIVE
HYALINE CASTS #/AREA URNS LPF: 0 /LPF
KETONES UR QL STRIP: NEGATIVE
LEUKOCYTE ESTERASE UR QL STRIP: NEGATIVE
MICROSCOPIC COMMENT: ABNORMAL
NITRITE UR QL STRIP: NEGATIVE
PH UR STRIP: 6 [PH] (ref 5–8)
PROT UR QL STRIP: ABNORMAL
RBC #/AREA URNS HPF: 2 /HPF (ref 0–4)
SP GR UR STRIP: 1.02 (ref 1–1.03)
URN SPEC COLLECT METH UR: ABNORMAL
UROBILINOGEN UR STRIP-ACNC: NEGATIVE EU/DL
WBC #/AREA URNS HPF: 2 /HPF (ref 0–5)

## 2020-09-03 PROCEDURE — 99999 PR PBB SHADOW E&M-EST. PATIENT-LVL V: ICD-10-PCS | Mod: PBBFAC,,, | Performed by: INTERNAL MEDICINE

## 2020-09-03 PROCEDURE — 99215 OFFICE O/P EST HI 40 MIN: CPT | Mod: 25,S$PBB,, | Performed by: INTERNAL MEDICINE

## 2020-09-03 PROCEDURE — 99215 OFFICE O/P EST HI 40 MIN: CPT | Mod: PBBFAC,25 | Performed by: INTERNAL MEDICINE

## 2020-09-03 PROCEDURE — 63600175 PHARM REV CODE 636 W HCPCS: Performed by: INTERNAL MEDICINE

## 2020-09-03 PROCEDURE — 25000003 PHARM REV CODE 250: Performed by: INTERNAL MEDICINE

## 2020-09-03 PROCEDURE — 99999 PR PBB SHADOW E&M-EST. PATIENT-LVL V: CPT | Mod: PBBFAC,,, | Performed by: INTERNAL MEDICINE

## 2020-09-03 PROCEDURE — 99215 PR OFFICE/OUTPT VISIT, EST, LEVL V, 40-54 MIN: ICD-10-PCS | Mod: 25,S$PBB,, | Performed by: INTERNAL MEDICINE

## 2020-09-03 PROCEDURE — 81000 URINALYSIS NONAUTO W/SCOPE: CPT

## 2020-09-03 PROCEDURE — 96413 CHEMO IV INFUSION 1 HR: CPT

## 2020-09-03 PROCEDURE — A4216 STERILE WATER/SALINE, 10 ML: HCPCS | Performed by: INTERNAL MEDICINE

## 2020-09-03 RX ORDER — METHYLPREDNISOLONE SOD SUCC 125 MG
125 VIAL (EA) INJECTION ONCE AS NEEDED
Status: CANCELLED | OUTPATIENT
Start: 2020-09-03

## 2020-09-03 RX ORDER — EPINEPHRINE 0.3 MG/.3ML
0.3 INJECTION SUBCUTANEOUS ONCE AS NEEDED
Status: CANCELLED | OUTPATIENT
Start: 2020-09-03

## 2020-09-03 RX ORDER — DEXAMETHASONE 0.5 MG/1
8 TABLET ORAL
Status: CANCELLED
Start: 2020-09-03

## 2020-09-03 RX ORDER — SODIUM CHLORIDE 0.9 % (FLUSH) 0.9 %
10 SYRINGE (ML) INJECTION
Status: DISCONTINUED | OUTPATIENT
Start: 2020-09-03 | End: 2020-09-03 | Stop reason: HOSPADM

## 2020-09-03 RX ORDER — HEPARIN 100 UNIT/ML
500 SYRINGE INTRAVENOUS
Status: DISCONTINUED | OUTPATIENT
Start: 2020-09-03 | End: 2020-09-03 | Stop reason: HOSPADM

## 2020-09-03 RX ORDER — DIPHENHYDRAMINE HYDROCHLORIDE 50 MG/ML
25 INJECTION INTRAMUSCULAR; INTRAVENOUS EVERY 10 MIN PRN
Status: CANCELLED | OUTPATIENT
Start: 2020-09-03

## 2020-09-03 RX ORDER — HEPARIN 100 UNIT/ML
500 SYRINGE INTRAVENOUS
Status: CANCELLED | OUTPATIENT
Start: 2020-09-03

## 2020-09-03 RX ORDER — SODIUM CHLORIDE 0.9 % (FLUSH) 0.9 %
10 SYRINGE (ML) INJECTION
Status: CANCELLED | OUTPATIENT
Start: 2020-09-03

## 2020-09-03 RX ADMIN — SODIUM CHLORIDE 10 ML: 9 INJECTION, SOLUTION INTRAMUSCULAR; INTRAVENOUS; SUBCUTANEOUS at 11:09

## 2020-09-03 RX ADMIN — BEVACIZUMAB 1795 MG: 400 INJECTION, SOLUTION INTRAVENOUS at 12:09

## 2020-09-03 RX ADMIN — HEPARIN 500 UNITS: 100 SYRINGE at 12:09

## 2020-09-03 NOTE — DISCHARGE INSTRUCTIONS
[facility]  [address] [facility]  [address] [address]  [phone] phone     [phone] fax  Hours of Operation: Monday- Friday 8:00am- 5:00pm  After hours phone  [phone]  Hematology / Oncology Physicians on call      Dr. Varinder Mckeon      Please call with any concerns regarding your appointment today.        FALL PREVENTION   Falls often occur due to slipping, tripping or losing your balance. Here are ways to reduce your risk of falling again.   Was there anything that caused your fall that can be fixed, removed or replaced?   Make your home safe by keeping walkways clear of objects you may trip over.   Use non-slip pads under rugs.   Do not walk in poorly lit areas.   Do not stand on chairs or wobbly ladders.   Use caution when reaching overhead or looking upward. This position can cause a loss of balance.   Be sure your shoes fit properly, have non-slip bottoms and are in good condition.   Be cautious when going up and down stairs, curbs, and when walking on uneven sidewalks.   If your balance is poor, consider using a cane or walker.   If your fall was related to alcohol use, stop or limit alcohol intake.   If your fall was related to use of sleeping medicines, talk to your doctor about this. You may need to reduce your dosage at bedtime if you awaken during the night to go to the bathroom.   To reduce the need for nighttime bathroom trips:   Avoid drinking fluids for several hours before going to bed   Empty your bladder before going to bed   Men can keep a urinal at the bedside   © 8563-6026 [employer], [address] [place] [address], [place] [address]. All rights reserved. This information is not intended as a substitute for professional medical care. Always follow your healthcare professional's instructions.    WAYS TO HELP PREVENT INFECTION         WASH YOUR HANDS OFTEN DURING THE DAY, ESPECIALLY BEFORE YOU EAT,  AFTER USING THE BATHROOM, AND AFTER TOUCHING ANIMALS     STAY AWAY FROM PEOPLE WHO HAVE ILLNESSES YOU CAN CATCH; SUCH AS COLDS, FLU, CHICKEN POX     TRY TO AVOID CROWDS     STAY AWAY FROM CHILDREN WHO RECENTLY HAVE RECEIVED LIVE VIRUS VACCINES     MAINTAIN GOOD MOUTH CARE     DO NOT SQUEEZE OR SCRATCH PIMPLES     CLEAN CUTS & SCRAPES RIGHT AWAY AND DAILY UNTIL HEALED WITH WARM WATER, SOAP & AN ANTISEPTIC     AVOID CONTACT WITH LITTER BOXES, BIRD CAGES, & FISH TANKS     AVOID STANDING WATER, IE., BIRD BATHS, FLOWER POTS/VASES, OR HUMIDIFIERS     WEAR GLOVES WHEN GARDENING OR CLEANING UP AFTER OTHERS, ESPECIALLY BABIES & SMALL CHILDREN    DO NOT EAT RAW FISH, SEAFOOD, MEAT, OR EGGS

## 2020-09-04 ENCOUNTER — TELEPHONE (OUTPATIENT)
Dept: NEPHROLOGY | Facility: CLINIC | Age: 65
End: 2020-09-04

## 2020-09-04 NOTE — TELEPHONE ENCOUNTER
----- Message from Paola Fortune sent at 9/4/2020 10:37 AM CDT -----  Regarding: her labs and test  Contact: pt  Caller is requesting a call back regarding her test and her labs.  Please call back at [phone] (home).  Thanks.

## 2020-09-08 ENCOUNTER — TELEPHONE (OUTPATIENT)
Dept: RADIOLOGY | Facility: HOSPITAL | Age: 65
End: 2020-09-08

## 2020-09-09 ENCOUNTER — HOSPITAL ENCOUNTER (OUTPATIENT)
Dept: RADIOLOGY | Facility: HOSPITAL | Age: 65
Discharge: HOME | End: 2020-09-09
Attending: INTERNAL MEDICINE
Payer: MEDICARE

## 2020-09-09 DIAGNOSIS — I10 HTN (HYPERTENSION), BENIGN: ICD-10-CM

## 2020-09-09 PROCEDURE — 76770 US EXAM ABDO BACK WALL COMP: CPT | Mod: TC

## 2020-09-09 PROCEDURE — 76770 US EXAM ABDO BACK WALL COMP: CPT | Mod: 26,,, | Performed by: RADIOLOGY

## 2020-09-09 PROCEDURE — 76770 US RETROPERITONEAL COMPLETE: ICD-10-PCS | Mod: 26,,, | Performed by: RADIOLOGY

## 2020-09-17 ENCOUNTER — HOSPITAL ENCOUNTER (EMERGENCY)
Facility: HOSPITAL | Age: 65
Discharge: HOME | End: 2020-09-17
Attending: STUDENT IN AN ORGANIZED HEALTH CARE EDUCATION/TRAINING PROGRAM
Payer: MEDICARE

## 2020-09-17 VITALS
SYSTOLIC BLOOD PRESSURE: 172 MMHG | HEART RATE: 98 BPM | TEMPERATURE: 99 F | RESPIRATION RATE: 16 BRPM | WEIGHT: 261.94 LBS | OXYGEN SATURATION: 95 % | DIASTOLIC BLOOD PRESSURE: 78 MMHG | BODY MASS INDEX: 41.11 KG/M2 | HEIGHT: 67 IN

## 2020-09-17 DIAGNOSIS — S16.1XXA CERVICAL STRAIN, ACUTE, INITIAL ENCOUNTER: Primary | ICD-10-CM

## 2020-09-17 DIAGNOSIS — M48.02 SPINAL STENOSIS OF CERVICAL REGION: ICD-10-CM

## 2020-09-17 LAB
ALBUMIN SERPL BCP-MCNC: 3.6 G/DL (ref 3.5–5.2)
ALP SERPL-CCNC: 108 U/L (ref 55–135)
ALT SERPL W/O P-5'-P-CCNC: 26 U/L (ref 10–44)
ANION GAP SERPL CALC-SCNC: 10 MMOL/L (ref 8–16)
AST SERPL-CCNC: 23 U/L (ref 10–40)
BASOPHILS # BLD AUTO: 0.06 K/UL (ref 0–0.2)
BASOPHILS NFR BLD: 0.7 % (ref 0–1.9)
BILIRUB SERPL-MCNC: 0.5 MG/DL (ref 0.1–1)
BUN SERPL-MCNC: 11 MG/DL (ref 8–23)
CALCIUM SERPL-MCNC: 9.7 MG/DL (ref 8.7–10.5)
CHLORIDE SERPL-SCNC: 106 MMOL/L (ref 95–110)
CO2 SERPL-SCNC: 27 MMOL/L (ref 23–29)
CREAT SERPL-MCNC: 0.9 MG/DL (ref 0.5–1.4)
CRP SERPL-MCNC: 6.7 MG/L (ref 0–8.2)
DIFFERENTIAL METHOD: ABNORMAL
EOSINOPHIL # BLD AUTO: 0.2 K/UL (ref 0–0.5)
EOSINOPHIL NFR BLD: 2.2 % (ref 0–8)
ERYTHROCYTE [DISTWIDTH] IN BLOOD BY AUTOMATED COUNT: 16.7 % (ref 11.5–14.5)
EST. GFR  (AFRICAN AMERICAN): >60 ML/MIN/1.73 M^2
EST. GFR  (NON AFRICAN AMERICAN): >60 ML/MIN/1.73 M^2
GLUCOSE SERPL-MCNC: 96 MG/DL (ref 70–110)
HCT VFR BLD AUTO: 41 % (ref 37–48.5)
HGB BLD-MCNC: 12.6 G/DL (ref 12–16)
IMM GRANULOCYTES # BLD AUTO: 0.05 K/UL (ref 0–0.04)
IMM GRANULOCYTES NFR BLD AUTO: 0.6 % (ref 0–0.5)
LYMPHOCYTES # BLD AUTO: 2.8 K/UL (ref 1–4.8)
LYMPHOCYTES NFR BLD: 31 % (ref 18–48)
MCH RBC QN AUTO: 27.7 PG (ref 27–31)
MCHC RBC AUTO-ENTMCNC: 30.7 G/DL (ref 32–36)
MCV RBC AUTO: 90 FL (ref 82–98)
MONOCYTES # BLD AUTO: 0.7 K/UL (ref 0.3–1)
MONOCYTES NFR BLD: 7.2 % (ref 4–15)
NEUTROPHILS # BLD AUTO: 5.3 K/UL (ref 1.8–7.7)
NEUTROPHILS NFR BLD: 58.3 % (ref 38–73)
NRBC BLD-RTO: 0 /100 WBC
PLATELET # BLD AUTO: 223 K/UL (ref 150–350)
PMV BLD AUTO: 9.3 FL (ref 9.2–12.9)
POTASSIUM SERPL-SCNC: 3.4 MMOL/L (ref 3.5–5.1)
PROT SERPL-MCNC: 6.9 G/DL (ref 6–8.4)
RBC # BLD AUTO: 4.55 M/UL (ref 4–5.4)
SODIUM SERPL-SCNC: 143 MMOL/L (ref 136–145)
WBC # BLD AUTO: 9.01 K/UL (ref 3.9–12.7)

## 2020-09-17 PROCEDURE — 36415 COLL VENOUS BLD VENIPUNCTURE: CPT

## 2020-09-17 PROCEDURE — 96375 TX/PRO/DX INJ NEW DRUG ADDON: CPT

## 2020-09-17 PROCEDURE — 96374 THER/PROPH/DIAG INJ IV PUSH: CPT | Mod: 59

## 2020-09-17 PROCEDURE — 63600175 PHARM REV CODE 636 W HCPCS: Performed by: NURSE PRACTITIONER

## 2020-09-17 PROCEDURE — 85025 COMPLETE CBC W/AUTO DIFF WBC: CPT

## 2020-09-17 PROCEDURE — 99285 EMERGENCY DEPT VISIT HI MDM: CPT | Mod: 25

## 2020-09-17 PROCEDURE — 25500020 PHARM REV CODE 255: Performed by: STUDENT IN AN ORGANIZED HEALTH CARE EDUCATION/TRAINING PROGRAM

## 2020-09-17 PROCEDURE — 80053 COMPREHEN METABOLIC PANEL: CPT

## 2020-09-17 PROCEDURE — 86140 C-REACTIVE PROTEIN: CPT

## 2020-09-17 RX ORDER — BACLOFEN 10 MG/1
10 TABLET ORAL 3 TIMES DAILY PRN
Qty: 20 TABLET | Refills: 0 | Status: SHIPPED | OUTPATIENT
Start: 2020-09-17 | End: 2020-09-22

## 2020-09-17 RX ORDER — ONDANSETRON 2 MG/ML
4 INJECTION INTRAMUSCULAR; INTRAVENOUS
Status: COMPLETED | OUTPATIENT
Start: 2020-09-17 | End: 2020-09-17

## 2020-09-17 RX ORDER — HYDROCODONE BITARTRATE AND ACETAMINOPHEN 10; 325 MG/1; MG/1
1 TABLET ORAL EVERY 4 HOURS PRN
Qty: 15 TABLET | Refills: 0 | Status: SHIPPED | OUTPATIENT
Start: 2020-09-17 | End: 2020-09-22

## 2020-09-17 RX ORDER — MORPHINE SULFATE 4 MG/ML
4 INJECTION, SOLUTION INTRAMUSCULAR; INTRAVENOUS
Status: COMPLETED | OUTPATIENT
Start: 2020-09-17 | End: 2020-09-17

## 2020-09-17 RX ADMIN — ONDANSETRON 4 MG: 2 INJECTION INTRAMUSCULAR; INTRAVENOUS at 07:09

## 2020-09-17 RX ADMIN — MORPHINE SULFATE 4 MG: 4 INJECTION, SOLUTION INTRAMUSCULAR; INTRAVENOUS at 07:09

## 2020-09-17 RX ADMIN — IOHEXOL 100 ML: 350 INJECTION, SOLUTION INTRAVENOUS at 08:09

## 2020-09-17 NOTE — ED PROVIDER NOTES
History      Chief Complaint   Patient presents with    Neck Pain     R side neck pain x3 days       Review of patient's allergies indicates:  No Known Allergies     HPI   HPI    2020, 6:59 PM   History obtained from the patient     History of Present Illness: Casie Gaines is 64 y.o. female patient with PMHx hypertension, arthritis, hyperlipidemia, CHF, anemia, anxiety, current we on chemo for ovarian cancer who presents to the Emergency Department for evaluation of right-sided neck swelling and pain  onset 3 days ago. Pt reports pain has progressively worsened over the past 3 days and reports no relief with medications at home.  Pt denies fever, sore throat, ear pain, drooling, change in voice, shortness of breath, chest pain, abdominal pain, vomiting, dizziness, weakness, and all other sxs at this time. No further complaints or concerns at this time.     Arrival mode: Personal vehicle      PCP: Rossana Ang MD       Past Medical History:  Past Medical History:   Diagnosis Date    Anemia     Anxiety     Arthritis     knees    Cancer     ovarian    CHF (congestive heart failure)     Hx antineoplastic chemotherapy     last 2019    Hyperlipemia     Hypertension     Neck pain     Ovarian cancer 2019    CHEMO       Past Surgical History:  Past Surgical History:   Procedure Laterality Date    breast reduction  10/02/2018     SECTION      X 1    CYSTOSCOPY W/ URETERAL STENT PLACEMENT Right 2019    Procedure: CYSTOSCOPY, WITH URETERAL STENT INSERTION;  Surgeon: Timmy Santiago IV, MD;  Location: [facility] OR;  Service: Urology;  Laterality: Right;    CYSTOSCOPY W/ URETERAL STENT REMOVAL  10/04/2019    DILATION AND CURETTAGE OF UTERUS      HYSTERECTOMY      RALH for fibroids (still has ovaries)    INSERTION OF VENOUS ACCESS PORT Left 2019    Procedure: INSERTION, VENOUS ACCESS PORT;  Surgeon: Ulisses Monzon MD;  Location: [facility] OR;  Service: General;  Laterality: Left;  Left  internal jugular     LYSIS OF ADHESIONS OF URETER N/A 8/15/2019    Procedure: URETEROLYSIS;  Surgeon: Ismael Juarez MD;  Location: [facility] OR;  Service: OB/GYN;  Laterality: N/A;    OMENTECTOMY N/A 8/15/2019    Procedure: OMENTECTOMY;  Surgeon: Ismael Juarez MD;  Location: [facility] OR;  Service: OB/GYN;  Laterality: N/A;    RETROGRADE PYELOGRAPHY Right 2019    Procedure: PYELOGRAM, RETROGRADE;  Surgeon: Timmy Santiago IV, MD;  Location: [facility] OR;  Service: Urology;  Laterality: Right;    ROBOT-ASSISTED LAPAROSCOPIC SALPINGO-OOPHORECTOMY USING DA TIA XI Bilateral 8/15/2019    Procedure: XI ROBOTIC SALPINGO-OOPHORECTOMY;  Surgeon: Ismael Juarez MD;  Location: [facility] OR;  Service: OB/GYN;  Laterality: Bilateral;    TOTAL REDUCTION MAMMOPLASTY  2018    TUBAL LIGATION           Family History:  Family History   Problem Relation Age of Onset    Anesthesia problems Other     Breast cancer Maternal Aunt     Breast cancer Paternal Aunt     Ovarian cancer Paternal Aunt     Colon cancer Brother     Thrombophilia Neg Hx        Social History:  Social History     Tobacco Use    Smoking status: Former Smoker     Packs/day: 1.00     Years: 25.00     Pack years: 25.00     Quit date: 10/1/2018     Years since quittin.9    Smokeless tobacco: Never Used    Tobacco comment: States started quit 2 months ago after 30 years   Substance and Sexual Activity    Alcohol use: Never     Alcohol/week: 0.0 standard drinks     Frequency: Never     Comment: occasionally  No alcohol 72h prior to sx    Drug use: No    Sexual activity: Not Currently     Partners: Male     Comment: hyst; mut monog       ROS   Review of Systems   Constitutional: Negative for chills and fever.   HENT: Negative for congestion, ear pain, sinus pain and sore throat.    Eyes: Negative for pain.   Respiratory: Negative for cough, chest tightness and shortness of breath.    Cardiovascular: Negative for chest pain.   Gastrointestinal: Negative for  abdominal pain, constipation, diarrhea, nausea and vomiting.   Genitourinary: Negative for decreased urine volume, difficulty urinating, dysuria, flank pain, frequency, hematuria, vaginal bleeding and vaginal discharge.   Musculoskeletal: Positive for neck pain and neck stiffness. Negative for back pain and gait problem.   Skin: Negative for rash.   Neurological: Negative for dizziness, syncope, facial asymmetry, speech difficulty, weakness, numbness and headaches.       Physical Exam      Initial Vitals [09/17/20 1852]   BP Pulse Resp Temp SpO2   (!) 172/78 98 16 98.6 °F (37 °C) 95 %      MAP       --          Physical Exam   Constitutional: She is oriented to person, place, and time. She appears well-developed and well-nourished. No distress.   HENT:   Head: Normocephalic and atraumatic.   Right Ear: Hearing, tympanic membrane, external ear and ear canal normal.   Nose: Nose normal.   Mouth/Throat: Uvula is midline, oropharynx is clear and moist and mucous membranes are normal.   Eyes: Pupils are equal, round, and reactive to light. Conjunctivae and EOM are normal.   Neck: Trachea normal. Neck supple. Muscular tenderness present. No spinous process tenderness present. No neck rigidity. Decreased range of motion present. No erythema present. No thyroid mass present.       Cardiovascular: Normal rate, regular rhythm and normal heart sounds.   Pulmonary/Chest: Effort normal and breath sounds normal. No respiratory distress. She exhibits no tenderness.   Abdominal: Soft. Bowel sounds are normal. There is no abdominal tenderness. There is no rigidity, no rebound and no guarding.   Musculoskeletal:      Cervical back: She exhibits normal range of motion, no tenderness, no bony tenderness, no swelling, no edema and no deformity.   Neurological: She is alert and oriented to person, place, and time.   Skin: Skin is warm and dry.       Nursing Notes and Vital Signs Reviewed.     ED Course    Procedures  ED Vital  "Signs:  Vitals:    09/17/20 1852 09/17/20 1916   BP: (!) 172/78    Pulse: 98    Resp: 16 16   Temp: 98.6 °F (37 °C)    TempSrc: Oral    SpO2: 95%    Weight: 118.8 kg (261 lb 14.5 oz)    Height: 5' 7" (1.702 m)        Abnormal Lab Results:  Labs Reviewed   CBC W/ AUTO DIFFERENTIAL - Abnormal; Notable for the following components:       Result Value    Mean Corpuscular Hemoglobin Conc 30.7 (*)     RDW 16.7 (*)     Immature Granulocytes 0.6 (*)     Immature Grans (Abs) 0.05 (*)     All other components within normal limits   COMPREHENSIVE METABOLIC PANEL - Abnormal; Notable for the following components:    Potassium 3.4 (*)     All other components within normal limits   C-REACTIVE PROTEIN        All Lab Results:  Results for orders placed or performed during the hospital encounter of 09/17/20   CBC auto differential   Result Value Ref Range    WBC 9.01 3.90 - 12.70 K/uL    RBC 4.55 4.00 - 5.40 M/uL    Hemoglobin 12.6 12.0 - 16.0 g/dL    Hematocrit 41.0 37.0 - 48.5 %    Mean Corpuscular Volume 90 82 - 98 fL    Mean Corpuscular Hemoglobin 27.7 27.0 - 31.0 pg    Mean Corpuscular Hemoglobin Conc 30.7 (L) 32.0 - 36.0 g/dL    RDW 16.7 (H) 11.5 - 14.5 %    Platelets 223 150 - 350 K/uL    MPV 9.3 9.2 - 12.9 fL    Immature Granulocytes 0.6 (H) 0.0 - 0.5 %    Gran # (ANC) 5.3 1.8 - 7.7 K/uL    Immature Grans (Abs) 0.05 (H) 0.00 - 0.04 K/uL    Lymph # 2.8 1.0 - 4.8 K/uL    Mono # 0.7 0.3 - 1.0 K/uL    Eos # 0.2 0.0 - 0.5 K/uL    Baso # 0.06 0.00 - 0.20 K/uL    nRBC 0 0 /100 WBC    Gran% 58.3 38.0 - 73.0 %    Lymph% 31.0 18.0 - 48.0 %    Mono% 7.2 4.0 - 15.0 %    Eosinophil% 2.2 0.0 - 8.0 %    Basophil% 0.7 0.0 - 1.9 %    Differential Method Automated    Comprehensive metabolic panel   Result Value Ref Range    Sodium 143 136 - 145 mmol/L    Potassium 3.4 (L) 3.5 - 5.1 mmol/L    Chloride 106 95 - 110 mmol/L    CO2 27 23 - 29 mmol/L    Glucose 96 70 - 110 mg/dL    BUN, Bld 11 8 - 23 mg/dL    Creatinine 0.9 0.5 - 1.4 mg/dL    " Calcium 9.7 8.7 - 10.5 mg/dL    Total Protein 6.9 6.0 - 8.4 g/dL    Albumin 3.6 3.5 - 5.2 g/dL    Total Bilirubin 0.5 0.1 - 1.0 mg/dL    Alkaline Phosphatase 108 55 - 135 U/L    AST 23 10 - 40 U/L    ALT 26 10 - 44 U/L    Anion Gap 10 8 - 16 mmol/L    eGFR if African American >60 >60 mL/min/1.73 m^2    eGFR if non African American >60 >60 mL/min/1.73 m^2   C-reactive protein   Result Value Ref Range    CRP 6.7 0.0 - 8.2 mg/L         Imaging Results:  Imaging Results          CT Soft Tissue Neck With Contrast (Final result)  Result time 09/17/20 20:56:55    Final result by Inocencio Francis Jr., MD (09/17/20 20:56:55)                 Impression:      1. No significant CT abnormality of the neck soft tissues.  2. Advanced degenerative spine change with severe spinal stenosis at C5-C6 and C6-C7.  All CT scans at this facility use dose modulation, iterative reconstruction, and/or weight base dosing when appropriate to reduce radiation dose to as low as reasonably achievable.      Electronically signed by: Inocencio Francis Jr., MD  Date:    09/17/2020  Time:    20:56             Narrative:    EXAMINATION:  CT SOFT TISSUE NECK WITH CONTRAST    CLINICAL HISTORY:  Neck mass, solitary, afebrile (Age => 14y);    TECHNIQUE:  Contiguous axial images were obtained from the aortic arch through the vertex with iodinated intravenous contrast in venous phase of imaging.    COMPARISON:  None.    FINDINGS:  Left-sided MediPort in place.    The parotid and submandibular glands are normal. The thyroid gland is normal in CT appearance.    The aerodigestive tract is not unusual in CT appearance. No abnormal mucosal enhancement patterns. The larynx is normal. The cervical esophagus is unremarkable.    There are no cervical lymph nodes which are enlarged by size criteria.    The carotid arteries and jugular veins are patent.    There is advanced degenerative cervical spine change with severe disc space height loss throughout and posterior disc  osteophyte complexes that results in moderate spinal stenosis at C3-C4, C4-C5 and C6-C7.  There is severe spinal stenosis at C5-C6 and C6-C7.  There is also foraminal stenosis at these levels that could correlate to radiculopathy.  Imaged intracranial structures are unremarkable. Paranasal sinuses and mastoid air cells are normal. Visualized lung apices and mediastinum are normal.                                              Cervical strain, acute, initial encounter    Spinal stenosis of cervical region    Other orders  -     CBC auto differential; Standing  -     Comprehensive metabolic panel; Standing  -     C-reactive protein; Standing  -     CT Soft Tissue Neck With Contrast; Standing  -     Insert Saline lock IV; Standing  -     morphine injection 4 mg  -     ondansetron injection 4 mg  -     iohexoL (OMNIPAQUE 350) injection 100 mL  -     baclofen (LIORESAL) 10 MG tablet; Take 1 tablet (10 mg total) by mouth 3 (three) times daily as needed (muscle pain).  Dispense: 20 tablet; Refill: 0  -     HYDROcodone-acetaminophen (NORCO)  mg per tablet; Take 1 tablet by mouth every 4 (four) hours as needed for Pain.  Dispense: 15 tablet; Refill: 0        The Emergency Provider reviewed the vital signs and test results, which are outlined above.    ED Discussion     9:39 PM: Reassessed pt at this time. Patient informed of results of labs and CT neck results. Patient ambulating without difficulty, neurovascularly intact with no neurovascular deficits on exam.   Pt states her condition has improved at this time after pain medications. Discussed with pt all pertinent ED information and results. Discussed pt dx and plan of tx. Gave pt all f/u and return to the ED instructions. All questions and concerns were addressed at this time. Pt expresses understanding of information and instructions, and is comfortable with plan to discharge. Pt is stable for discharge.    I discussed with patient and/or family/caretaker that  evaluation in the ED does not suggest any emergent or life threatening medical conditions requiring immediate intervention beyond what was provided in the ED, and I believe patient is safe for discharge.  Regardless, an unremarkable evaluation in the ED does not preclude the development or presence of a serious of life threatening condition. As such, patient was instructed to return immediately for any worsening or change in current symptoms.        ED Medication(s):  Medications   morphine injection 4 mg (4 mg Intravenous Given 9/17/20 1916)   ondansetron injection 4 mg (4 mg Intravenous Given 9/17/20 1916)   iohexoL (OMNIPAQUE 350) injection 100 mL (100 mLs Intravenous Given 9/17/20 2028)     New Prescriptions    BACLOFEN (LIORESAL) 10 MG TABLET    Take 1 tablet (10 mg total) by mouth 3 (three) times daily as needed (muscle pain).    HYDROCODONE-ACETAMINOPHEN (NORCO)  MG PER TABLET    Take 1 tablet by mouth every 4 (four) hours as needed for Pain.      Follow-up Information     Shantanu Fairbanks MD In 1 week.    Specialty: Neurosurgery  Why: Follow up with spine doctor of your choice for further evaluation of spinal stenosis of cervical region.  Return to ER for any concerns.  Contact information:  [address]  [place] [address] [phone]                       Medical Decision Making                     Clinical Impression       ICD-10-CM ICD-9-CM   1. Cervical strain, acute, initial encounter  S16.1XXA 847.0   2. Spinal stenosis of cervical region  M48.02 723.0       Disposition:   Disposition: Discharged  Condition: Stable         Louise Mejia NP  09/17/20 5375

## 2020-09-18 ENCOUNTER — PATIENT OUTREACH (OUTPATIENT)
Dept: ADMINISTRATIVE | Facility: OTHER | Age: 65
End: 2020-09-18

## 2020-09-18 NOTE — ED NOTES
Patient examined, evaluated, and educated on discharge prescriptions and instructions by Rachel GARCIA. Patient discharged to [facility] by Rachel GARCIA.

## 2020-09-18 NOTE — DISCHARGE INSTRUCTIONS
Return to the ED immediately for any worsening of symptoms, fever, numbness or tingling to upper extremities, headache, slurred speech, facial droop, rash, dizziness, confusion, weakness or any concerns.

## 2020-09-18 NOTE — PROGRESS NOTES
Health Maintenance Due   Topic Date Due    HIV Screening  11/[birth year]/1969    DEXA SCAN  11/16/1994    Shingles Vaccine (2 of 3) 08/06/2015    Pneumococcal Vaccine (65+ High/Highest Risk) (2 of 2 - PPSV23) 11/16/2019    Influenza Vaccine (1) 08/01/2020     Updates were requested from care everywhere.  Chart was reviewed for overdue Proactive [facility] Encounters (MARISOL) topics (CRS, Breast Cancer Screening, Eye exam)  Health Maintenance has been updated.  LINKS immunization registry triggered.  Immunizations were reconciled.

## 2020-09-19 DIAGNOSIS — I10 ESSENTIAL HYPERTENSION: ICD-10-CM

## 2020-09-19 RX ORDER — OLMESARTAN MEDOXOMIL AND HYDROCHLOROTHIAZIDE 40/12.5 40; 12.5 MG/1; MG/1
1 TABLET ORAL DAILY
Qty: 90 TABLET | Refills: 0 | Status: SHIPPED | OUTPATIENT
Start: 2020-09-19 | End: 2021-01-28 | Stop reason: DRUGHIGH

## 2020-09-21 ENCOUNTER — OFFICE VISIT (OUTPATIENT)
Dept: ORTHOPEDICS | Facility: CLINIC | Age: 65
End: 2020-09-21
Payer: MEDICARE

## 2020-09-21 VITALS
WEIGHT: 261.94 LBS | HEART RATE: 81 BPM | SYSTOLIC BLOOD PRESSURE: 137 MMHG | BODY MASS INDEX: 41.11 KG/M2 | DIASTOLIC BLOOD PRESSURE: 78 MMHG | HEIGHT: 67 IN

## 2020-09-21 DIAGNOSIS — M54.2 CERVICALGIA: Primary | ICD-10-CM

## 2020-09-21 DIAGNOSIS — M79.18 MYOFASCIAL PAIN ON RIGHT SIDE: ICD-10-CM

## 2020-09-21 DIAGNOSIS — M75.52 BURSITIS OF LEFT SHOULDER: ICD-10-CM

## 2020-09-21 PROCEDURE — 99215 OFFICE O/P EST HI 40 MIN: CPT | Mod: PBBFAC | Performed by: PHYSICIAN ASSISTANT

## 2020-09-21 PROCEDURE — 99203 OFFICE O/P NEW LOW 30 MIN: CPT | Mod: S$PBB,,, | Performed by: PHYSICIAN ASSISTANT

## 2020-09-21 PROCEDURE — 99999 PR PBB SHADOW E&M-EST. PATIENT-LVL V: ICD-10-PCS | Mod: PBBFAC,,, | Performed by: PHYSICIAN ASSISTANT

## 2020-09-21 PROCEDURE — 99999 PR PBB SHADOW E&M-EST. PATIENT-LVL V: CPT | Mod: PBBFAC,,, | Performed by: PHYSICIAN ASSISTANT

## 2020-09-21 PROCEDURE — 99203 PR OFFICE/OUTPT VISIT, NEW, LEVL III, 30-44 MIN: ICD-10-PCS | Mod: S$PBB,,, | Performed by: PHYSICIAN ASSISTANT

## 2020-09-21 RX ORDER — METHOCARBAMOL 500 MG/1
500 TABLET, FILM COATED ORAL 3 TIMES DAILY PRN
Qty: 60 TABLET | Refills: 0 | Status: SHIPPED | OUTPATIENT
Start: 2020-09-21 | End: 2021-12-22

## 2020-09-21 NOTE — LETTER
September 21, 2020      Oscar Mckeon MD  [address]  [place] [address]           '[facility]  [address] [place] [id number]  Phone: [phone]  Fax: [phone]          Patient: Casie Gaines   MR Number: [id number]   YOB: 1955   Date of Visit: 9/21/2020       Dear Dr. Oscar Mckeon:    Thank you for referring Casie Gaines to me for evaluation. Attached you will find relevant portions of my assessment and plan of care.    If you have questions, please do not hesitate to call me. I look forward to following Casie Gaines along with you.    Sincerely,    RADHA Murguiaosure  CC:  No Recipients    If you would like to receive this communication electronically, please contact [email or web address] or [phone] to request more information on [facility] access.    For providers and/or their staff who would like to refer a patient to [facility], please contact us through our one-stop-shop provider referral line, [facility] , at [phone].    If you feel you have received this communication in error or would no longer like to receive these types of communications, please e-mail [email or web address]

## 2020-09-21 NOTE — PATIENT INSTRUCTIONS
Turmeric Supplement (over the counter)  500mg twice a day.        Neck Exercises: Active Neck Rotation    To start, lie on your back, knees bent and feet flat on the floor. Keep your ears, shoulders, and hips aligned, but dont press your lower back to the floor. Rest your hands on your pelvis. Breathe deeply and relax.  · Use your neck muscles to turn your head to one side until you feel a stretch in the muscles.  · Hold for 5 seconds. Then turn to the other side.  · Repeat 5 times on each side.  Note: Keep your shoulders on the floor. Dont lift or tuck your chin as you turn your head.  Date Last Reviewed: 8/16/2015 © 2000-2017 [employer]. [address] [place] [address]. All rights reserved. This information is not intended as a substitute for professional medical care. Always follow your healthcare professional's instructions.        Neck Exercises: Passive Neck Rotation    To start, lie on your back, knees bent and feet flat on the floor. Keep your ears, shoulders, and hips aligned, but dont press your lower back to the floor. Rest your hands on your pelvis. Breathe deeply and relax.  · With your neck relaxed, place the palm of one hand on your forehead. Use your hand to turn your head to one side until you feel a stretch in the neck muscles. Do not push through pain.  · Hold for 5 seconds. Then turn to the other side.  · Repeat 5 times on each side.   Note: Keep your shoulders on the floor. Dont lift your chin as you turn your head.  Date Last Reviewed: 8/16/2015 © 2000-2017 [employer]. [address] [place] [address]. All rights reserved. This information is not intended as a substitute for professional medical care. Always follow your healthcare professional's instructions.        Exercises: Neck Isometrics  To start, sit in a chair with your feet flat on the floor. Your weight should be slightly forward so that youre balanced evenly on your buttocks.  Relax your shoulders and keep your head level. Using a chair with arms may help you keep your balance.       1. Press your palm against your forehead. Resist with your neck muscles. Hold for 10 seconds. Relax. Repeat 5 times.  2. Do the exercise again, pressing on the side of your head. Repeat 5 times. Switch sides.  3. Do the exercise again, pressing on the back of your head. Repeat 5 times.  For your safety, check with your healthcare provider before starting an exercise program.   Date Last Reviewed: 8/16/2015  © 3262-9522 [employer]. [address] [place] [address]. All rights reserved. This information is not intended as a substitute for professional medical care. Always follow your healthcare professional's instructions.        Neck Exercises: Neck Flex  To start, sit in a chair with your feet flat on the floor. Your weight should be slightly forward so that youre balanced evenly on your buttocks. Relax your shoulders and keep your head level. Avoid arching your back or rounding your shoulders. Using a chair with arms may help you keep your balance:  · Rest the back of your left hand against your lower back. Place your right palm on the top of your head.  · Gently pull your head forward and down until you feel a stretch in the muscles in the back of your neck. Dont force the motion.  · Hold for 20 seconds, then return to starting position. Switch arms.    Date Last Reviewed: 10/2/2015  © 0503-9583 [employer]. [address] [place] [address]. All rights reserved. This information is not intended as a substitute for professional medical care. Always follow your healthcare professional's instructions.        Neck Exercises: Neck Glide    To start, lie on your back, knees bent and feet flat on the floor. Keep your ears, shoulders, and hips aligned. Dont press your lower back to the floor. Rest your hands on your pelvis. Breathe deeply and relax.  · Gently  flatten the curve of your neck against the floor.  · Lengthen your neck as though youre growing taller. Dont jam your chin into your chest, and dont push too hard.  · Hold for 5 seconds. Release. Repeat 3 to 5 times.  Date Last Reviewed: 10/2/2015  © 3680-1067 [employer]. [address] [place] [address], [place] [address]. All rights reserved. This information is not intended as a substitute for professional medical care. Always follow your healthcare professional's instructions.

## 2020-09-21 NOTE — PROGRESS NOTES
Subjective:      Patient ID: Casie Gaines is a 64 y.o. female.    Chief Complaint: Pain of the Left Shoulder and Pain of the Right Shoulder      HPI: Casie Gaines  is a 64 y.o. female who c/o Pain of the Left Shoulder and Pain of the Right Shoulder   for duration of 3 weeks for the left shoulder in 1 week for the right.  She was in the emergency room on 2020 for the left shoulder.  They gave her a Medrol Dosepak.  She says that that completely resolved her symptoms on the left and she is not having trouble at this time.  She comes today due to orthopedic referral following that ER evaluation.  However, she had a different evaluation in the ER last week on 2020.  They diagnosed her with a cervical strain and advised her to follow-up with Dr. Shantanu Fairbanks.  She does not have an appointment scheduled with him yet.  Pain level is 10/10.  Quality is aching and constant.  She points to the right trapezial area and paraspinal musculature on the right side is to wear the pain is located presently.  She denies radicular symptoms.  Quality is aching and constant.  She was given a prescription for Norco as well as baclofen.  She says those only put her to sleep.  Aggravating factors include movement of the head and neck.  She noticed she had some discolored sputum and is concerned that some of this may be related to infection.    Past Medical History:   Diagnosis Date    Anemia     Anxiety     Arthritis     knees    Cancer     ovarian    CHF (congestive heart failure)     Hx antineoplastic chemotherapy     last 2019    Hyperlipemia     Hypertension     Neck pain     Ovarian cancer 2019    CHEMO     Past Surgical History:   Procedure Laterality Date    breast reduction  10/02/2018     SECTION      X 1    CYSTOSCOPY W/ URETERAL STENT PLACEMENT Right 2019    Procedure: CYSTOSCOPY, WITH URETERAL STENT INSERTION;  Surgeon: Timmy Santiago IV, MD;  Location: [facility] OR;   Service: Urology;  Laterality: Right;    CYSTOSCOPY W/ URETERAL STENT REMOVAL  10/04/2019    DILATION AND CURETTAGE OF UTERUS      HYSTERECTOMY      RALH for fibroids (still has ovaries)    INSERTION OF VENOUS ACCESS PORT Left 2/18/2019    Procedure: INSERTION, VENOUS ACCESS PORT;  Surgeon: Ulisses Monzon MD;  Location: [facility] OR;  Service: General;  Laterality: Left;  Left internal jugular     LYSIS OF ADHESIONS OF URETER N/A 8/15/2019    Procedure: URETEROLYSIS;  Surgeon: Ismael Juarez MD;  Location: [facility] OR;  Service: OB/GYN;  Laterality: N/A;    OMENTECTOMY N/A 8/15/2019    Procedure: OMENTECTOMY;  Surgeon: Ismael Juarez MD;  Location: [facility] OR;  Service: OB/GYN;  Laterality: N/A;    RETROGRADE PYELOGRAPHY Right 1/30/2019    Procedure: PYELOGRAM, RETROGRADE;  Surgeon: Timmy Santiago IV, MD;  Location: [facility] OR;  Service: Urology;  Laterality: Right;    ROBOT-ASSISTED LAPAROSCOPIC SALPINGO-OOPHORECTOMY USING DA TIA XI Bilateral 8/15/2019    Procedure: XI ROBOTIC SALPINGO-OOPHORECTOMY;  Surgeon: Ismael Juarez MD;  Location[facility];  Service: OB/GYN;  Laterality: Bilateral;    TOTAL REDUCTION MAMMOPLASTY  2018    TUBAL LIGATION       Family History   Problem Relation Age of Onset    Anesthesia problems Other     Breast cancer Maternal Aunt     Breast cancer Paternal Aunt     Ovarian cancer Paternal Aunt     Colon cancer Brother     Thrombophilia Neg Hx      Social History     Socioeconomic History    Marital status:      Spouse name: Not on file    Number of children: Not on file    Years of education: Not on file    Highest education level: Not on file   Occupational History    Not on file   Social Needs    Financial resource strain: Not on file    Food insecurity     Worry: Not on file     Inability: Not on file    Transportation needs     Medical: Not on file     Non-medical: Not on file   Tobacco Use    Smoking status: Former Smoker     Packs/day: 1.00     Years: 25.00      Pack years: 25.00     Quit date: 10/1/2018     Years since quittin.9    Smokeless tobacco: Never Used    Tobacco comment: States started quit 2 months ago after 30 years   Substance and Sexual Activity    Alcohol use: Never     Alcohol/week: 0.0 standard drinks     Frequency: Never     Comment: occasionally  No alcohol 72h prior to sx    Drug use: No    Sexual activity: Not Currently     Partners: Male     Comment: hyst; mut monog   Lifestyle    Physical activity     Days per week: Not on file     Minutes per session: Not on file    Stress: Only a little   Relationships    Social connections     Talks on phone: Not on file     Gets together: Not on file     Attends Jewish service: Not on file     Active member of club or organization: Not on file     Attends meetings of clubs or organizations: Not on file     Relationship status: Not on file   Other Topics Concern    Not on file   Social History Narrative    Live w/ spouse and 2 dogs      Medication List with Changes/Refills   New Medications    METHOCARBAMOL (ROBAXIN) 500 MG TAB    Take 1 tablet (500 mg total) by mouth 3 (three) times daily as needed (muscle spasms).   Current Medications    ALBUTEROL 90 MCG/ACTUATION INHALER    Inhale 2 puffs into the lungs every 4 (four) hours as needed for Wheezing. Rescue    ALPRAZOLAM (XANAX) 0.25 MG TABLET    Take 1 tablet (0.25 mg total) by mouth 3 (three) times daily as needed for Anxiety.    AMLODIPINE (NORVASC) 5 MG TABLET    Take 2 tablets (10 mg total) by mouth once daily.    BACLOFEN (LIORESAL) 10 MG TABLET    Take 1 tablet (10 mg total) by mouth 3 (three) times daily as needed (muscle pain).    BETAMETHASONE VALERATE 0.1% (VALISONE) 0.1 % CREA    Apply topically 2 (two) times daily.    BIOTIN 1 MG TABLET    Take 1,000 mcg by mouth once daily.     CETIRIZINE (ZYRTEC) 10 MG TABLET    Take 1 tablet (10 mg total) by mouth once daily.    DICLOFENAC SODIUM (VOLTAREN) 1 % GEL    Apply once a day to back as  needed    DIPHENHYDRAMINE-ZINC ACETATE 1-0.1% (BENADRYL ITCH STOPPING) CREAM    Apply topically 2 (two) times daily as needed for Itching.    EPINEPHRINE (EPIPEN) 0.3 MG/0.3 ML ATIN    Inject 0.3 mLs (0.3 mg total) into the muscle once. for 1 dose    GABAPENTIN (NEURONTIN) 100 MG CAPSULE    Take 1 capsule (100 mg total) by mouth 3 (three) times daily.    HYDROCODONE-ACETAMINOPHEN (NORCO)  MG PER TABLET    Take 1 tablet by mouth every 4 (four) hours as needed for Pain.    HYDROXYZINE HCL (ATARAX) 25 MG TABLET    Take 1 tablet (25 mg total) by mouth 3 (three) times daily as needed for Itching.    MULTIVITAMIN WITH MINERALS TABLET    Take 1 tablet by mouth once daily.     OLMESARTAN-HYDROCHLOROTHIAZIDE (BENICAR HCT) 40-12.5 MG TAB    Take 1 tablet by mouth once daily.    OXYCODONE-ACETAMINOPHEN (PERCOCET)  MG PER TABLET    Take 1 tablet by mouth every 8 (eight) hours as needed.    ROSUVASTATIN (CRESTOR) 10 MG TABLET    Take 1 tablet (10 mg total) by mouth once daily.    SERTRALINE (ZOLOFT) 25 MG TABLET    Take 1 tablet (25 mg total) by mouth once daily.    TRIAMCINOLONE ACETONIDE 0.1% (KENALOG) 0.1 % CREAM    Apply topically 2 (two) times daily.    ZINC GLUCONATE 50 MG TABLET    Take 50 mg by mouth once daily.     Review of patient's allergies indicates:  No Known Allergies    Review of Systems   Constitution: Negative for fever.   Cardiovascular: Negative for chest pain.   Respiratory: Negative for cough and shortness of breath.    Skin: Negative for rash.   Musculoskeletal: Positive for myalgias, neck pain and stiffness. Negative for joint pain and joint swelling.   Gastrointestinal: Negative for heartburn.   Neurological: Negative for headaches and numbness.         Objective:        General    Nursing note and vitals reviewed.  Constitutional: She is oriented to person, place, and time. She appears well-developed and well-nourished.   HENT:   Head: Normocephalic and atraumatic.   Eyes: EOM are normal.    Cardiovascular: Normal rate and regular rhythm.    Pulmonary/Chest: Effort normal.   Neurological: She is alert and oriented to person, place, and time.   Psychiatric: She has a normal mood and affect. Her behavior is normal.         Back (L-Spine & T-Spine) / Neck (C-Spine) Exam     Tenderness   The patient is tender to palpation of the right trapezial and right SCM.     Neck (C-Spine) Range of Motion   Flexion:     Normal  Extension: Normal  Right Rotation: abnormal  Left Rotation: normal    Spinal Sensation   Right Side Sensation  C-Spine Level: normal   Left Side Sensation  C-Spine Level: normal    Neck (C-Spine) Tests   Spurling's Test   Left:  Negative  Right: negative  Right Shoulder Exam     Inspection/Observation   Swelling: absent  Bruising: absent  Scars: absent  Deformity: absent  Scapular Dyskinesia: negative    Range of Motion   Active abduction: normal   Passive abduction: normal   Extension: normal   Forward Flexion: normal   Forward Elevation: normal  Adduction: normal  External Rotation 0 degrees: normal   Internal rotation 0 degrees: normal     Tests & Signs   Drop arm: negative  Zaldivar test: negative  Impingement: negative  Active Compression Test (Millard's Sign): negative  Speed's Test: negative    Other   Sensation: normal    Left Shoulder Exam     Inspection/Observation   Swelling: absent  Bruising: absent  Scars: absent  Deformity: absent  Scapular Dyskinesia: negative    Tenderness   The patient is experiencing no tenderness.     Range of Motion   Active abduction: normal   Passive abduction: normal   Extension: normal   Forward Flexion: normal   Forward Elevation: normal  Adduction: normal  External Rotation 0 degrees: normal   Internal rotation 0 degrees: normal     Tests & Signs   Drop arm: negative  Zaldivar test: negative  Impingement: negative  Active Compression test (Millard's Sign): negative  Speed's Test: negative    Other   Sensation: normal       Muscle Strength   Right Upper  Extremity   Shoulder Abduction: 5/5   Shoulder Internal Rotation: 5/5   Shoulder External Rotation: 5/5   Deltoid:  5/5  Triceps:  5/5  Wrist extension: 5/5   Wrist flexion: 5/5   Finger Flexors:  5/5  Left Upper Extremity  Shoulder Abduction: 5/5   Shoulder Internal Rotation: 5/5   Shoulder External Rotation: 5/5   Deltoid:  5/5  Triceps:  5/5  Wrist extension: 5/5   Wrist flexion: 5/5   Finger Flexors:  5/5    Reflexes     Left Side  Biceps:  2+  Brachioradialis:  2+    Right Side   Biceps:  2+  Brachioradialis:  2+    Vascular Exam     Right Pulses      Radial:                    2+      Left Pulses      Radial:                    2+      Capillary Refill  Right Hand: normal capillary refill  Left Hand: normal capillary refill              Xray images and report were reviewed today.  I agree with the radiologist's interpretation.    CT Soft Tissue Neck With Contrast  Narrative: EXAMINATION:  CT SOFT TISSUE NECK WITH CONTRAST    CLINICAL HISTORY:  Neck mass, solitary, afebrile (Age => 14y);    TECHNIQUE:  Contiguous axial images were obtained from the aortic arch through the vertex with iodinated intravenous contrast in venous phase of imaging.    COMPARISON:  None.    FINDINGS:  Left-sided MediPort in place.    The parotid and submandibular glands are normal. The thyroid gland is normal in CT appearance.    The aerodigestive tract is not unusual in CT appearance. No abnormal mucosal enhancement patterns. The larynx is normal. The cervical esophagus is unremarkable.    There are no cervical lymph nodes which are enlarged by size criteria.    The carotid arteries and jugular veins are patent.    There is advanced degenerative cervical spine change with severe disc space height loss throughout and posterior disc osteophyte complexes that results in moderate spinal stenosis at C3-C4, C4-C5 and C6-C7.  There is severe spinal stenosis at C5-C6 and C6-C7.  There is also foraminal stenosis at these levels that could  correlate to radiculopathy.  Imaged intracranial structures are unremarkable. Paranasal sinuses and mastoid air cells are normal. Visualized lung apices and mediastinum are normal.  Impression: 1. No significant CT abnormality of the neck soft tissues.  2. Advanced degenerative spine change with severe spinal stenosis at C5-C6 and C6-C7.  All CT scans at this facility use dose modulation, iterative reconstruction, and/or weight base dosing when appropriate to reduce radiation dose to as low as reasonably achievable.    Electronically signed by: Inocencio Francis Jr., MD  Date:    09/17/2020  Time:    20:56    X-Ray Shoulder 2 or More Views Left  Order: [id number]  Status:  Final result   Visible to patient:  Yes (Patient Portal)   Next appt:  09/22/2020 at 08:40 AM in Internal Medicine (Rossana Ang MD)  Details    Reading Physician Reading Date Result Priority   Zay Scott MD  [phone] 8/28/2020 STAT      Narrative & Impression     EXAMINATION:  XR SHOULDER COMPLETE 2 OR MORE VIEWS LEFT     CLINICAL HISTORY:  left shoulder pain;     TECHNIQUE:  Two or three views of the left shoulder were performed.     COMPARISON:  None     FINDINGS:  No fracture.  No dislocation.  There is relatively mild degenerative change.  Soft tissues appear intact.  Left chest wall port catheter in place.     Impression:     No acute abnormality.        Electronically signed by: Zay Scott  Date:                                            08/28/2020  Time:                                           18:37               Assessment:       Encounter Diagnoses   Name Primary?    Bursitis of left shoulder     Cervicalgia Yes    Myofascial pain on right side           Plan:       Casie was seen today for pain and pain.    Diagnoses and all orders for this visit:    Cervicalgia  -     Ambulatory referral/consult to Physical/Occupational Therapy; Future  -     methocarbamoL (ROBAXIN) 500 MG Tab; Take 1 tablet (500 mg total) by mouth 3  (three) times daily as needed (muscle spasms).  -     Ambulatory referral/consult to Pain Clinic; Future    Bursitis of left shoulder  -     Ambulatory referral/consult to Orthopedics    Myofascial pain on right side  -     Ambulatory referral/consult to Physical/Occupational Therapy; Future  -     methocarbamoL (ROBAXIN) 500 MG Tab; Take 1 tablet (500 mg total) by mouth 3 (three) times daily as needed (muscle spasms).  -     Ambulatory referral/consult to Pain Clinic; Future        Casie Gaines is a new pt who comes in today for the above problems.  I have given her a home exercise program that she can work on for left shoulder strengthening.  However, as far as the pain she was experiencing previously it has completely resolved.  She can follow up with me p.r.n..  Her biggest problem today is cervicalgia as well as myofascial pain on the right side.  I would recommend formal physical therapy for myofascial release and dry needling.  Additionally I have given her some home exercises to work on range of motion of the neck.  I have her come off of the Norco as well as the baclofen.  Instead I put her on a prescription for Robaxin as above.  I have also advised her to use Voltaren gel 2 g topically 3 times daily as needed.  I would like her to get an x-ray of her neck on the way out.  I will notify her via the patient portal of those results.  Lastly, she does not improve, she would benefit from evaluation with Dr. Buckley for potential trigger point injections verses further diagnostic workup for consideration of epidural injections.  She verbalizes understanding and agrees.    Follow up in about 1 month (around 10/21/2020) for IPM consult.          The patient understands, chooses and consents to this plan and accepts all   the risks which include but are not limited to the risks mentioned above.     Disclaimer: This note was prepared using a voice recognition system and is likely to have sound alike errors  within the text.

## 2020-09-21 NOTE — PROGRESS NOTES
Subjective:       Patient ID: Casie Gaines is a 64 y.o. female.    Chief Complaint: Ovarian cancer, bilateral [C56.1, C56.2]  HPI: We have an opportunity to see Ms. Casie Gaines in Hematology Oncology clinic at [facility] on 09/21/2020.  Ms. Casie Gaines is a 64 y.o. woman with peritoneal carcinomatosis with malignant right pleural effusion.  Final pathology is consistent with ovarian primary with  2740.  PET scan demonstrates multiple omental and peritoneal avid masses consistent with peritoneal carcinomatosis, extensive retroperitoneal and mediastinal lymphadenopathy, masslike structure in the right iliac fossa likely representing ovarian mass.  Patient has been evaluated by GYN Oncology Dr. Juarez and MD Green with recommendations for him carboplatin/Taxol/Avastin.      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  S/p 6 cycle avastin taxol carboplatin.  Has great VT after 6 cycles. On 8/15/2019 underwent salpingoophorectomy. Pathology showed CR. Currently on maintenance avastin.    Oncology History   Peritoneal carcinomatosis   1/26/2019 Initial Diagnosis    Peritoneal carcinomatosis     2/15/2019 - 7/8/2019 Chemotherapy    Treatment Summary   Plan Name: OP GYN PACLITAXEL CARBOPLATIN (AUC 6) Q3W  Treatment Goal: Palliative  Status: Inactive  Start Date: 2/28/2019  End Date: 6/18/2019  Provider: Will Trevizo Jr., MD  Chemotherapy: bevacizumab (AVASTIN) 15 mg/kg = 1,600 mg in sodium chloride 0.9% 100 mL chemo infusion, 15 mg/kg = 1,600 mg (100 % of original dose 15 mg/kg), Intravenous, Clinic/[facility] 1 time, 6 of 6 cycles  Dose modification: 15 mg/kg (original dose 15 mg/kg, Cycle 1)  Administration: 1,600 mg (2/28/2019), 1,600 mg (3/21/2019), 1,600 mg (4/11/2019), 1,600 mg (5/7/2019), 1,600 mg (5/28/2019), 1,510 mg (6/18/2019)  CARBOplatin (PARAPLATIN) 870 mg in sodium chloride 0.9% 337 mL chemo infusion, 870 mg (100 % of original dose 868.8 mg),  Intravenous, Clinic/[facility] 1 time, 6 of 6 cycles  Dose modification:   (original dose 868.8 mg, Cycle 1)  Administration: 870 mg (2/28/2019), 870 mg (3/21/2019), 900 mg (4/11/2019), 870 mg (5/7/2019), 660 mg (5/28/2019), 690 mg (6/18/2019)  PACLitaxel (TAXOL) 175 mg/m2 = 396 mg in sodium chloride 0.9% 566 mL chemo infusion, 175 mg/m2 = 396 mg, Intravenous, Clinic/[facility] 1 time, 6 of 6 cycles  Dose modification: 140 mg/m2 (80 % of original dose 175 mg/m2, Cycle 5)  Administration: 396 mg (2/28/2019), 396 mg (3/21/2019), 396 mg (4/11/2019), 396 mg (5/7/2019), 318 mg (5/28/2019), 306 mg (6/18/2019)     10/9/2019 -  Chemotherapy    Treatment Summary   Plan Name: OP BEVACIZUMAB Q3W   Treatment Goal: Maintenance  Status: Active  Start Date: 10/9/2019  End Date: 1/28/2021 (Planned)  Provider: Oscar Mckeon MD  Chemotherapy: bevacizumab (AVASTIN) 15 mg/kg = 1,615 mg in sodium chloride 0.9% 100 mL chemo infusion, 15 mg/kg = 1,615 mg, Intravenous, Clinic/[facility] 1 time, 10 of 17 cycles  Administration: 1,615 mg (10/9/2019), 1,615 mg (10/29/2019), 1,615 mg (12/10/2019), 1,615 mg (1/21/2020), 1,600 mg (4/2/2020), 1,615 mg (4/23/2020), 1,600 mg (7/1/2020), 1,600 mg (7/22/2020), 1,600 mg (8/13/2020), 1,795 mg (9/3/2020)     Malignant neoplasm of right ovary   2/15/2019 Initial Diagnosis    Malignant neoplasm of right ovary     2/15/2019 - 7/8/2019 Chemotherapy    Treatment Summary   Plan Name: OP GYN PACLITAXEL CARBOPLATIN (AUC 6) Q3W  Treatment Goal: Palliative  Status: Inactive  Start Date: 2/28/2019  End Date: 6/18/2019  Provider: Will Trevizo Jr., MD  Chemotherapy: bevacizumab (AVASTIN) 15 mg/kg = 1,600 mg in sodium chloride 0.9% 100 mL chemo infusion, 15 mg/kg = 1,600 mg (100 % of original dose 15 mg/kg), Intravenous, Clinic/[facility] 1 time, 6 of 6 cycles  Dose modification: 15 mg/kg (original dose 15 mg/kg, Cycle 1)  Administration: 1,600 mg (2/28/2019), 1,600 mg (3/21/2019), 1,600 mg (4/11/2019), 1,600 mg (5/7/2019), 1,600 mg  (5/28/2019), 1,510 mg (6/18/2019)  CARBOplatin (PARAPLATIN) 870 mg in sodium chloride 0.9% 337 mL chemo infusion, 870 mg (100 % of original dose 868.8 mg), Intravenous, Clinic/[facility] 1 time, 6 of 6 cycles  Dose modification:   (original dose 868.8 mg, Cycle 1)  Administration: 870 mg (2/28/2019), 870 mg (3/21/2019), 900 mg (4/11/2019), 870 mg (5/7/2019), 660 mg (5/28/2019), 690 mg (6/18/2019)  PACLitaxel (TAXOL) 175 mg/m2 = 396 mg in sodium chloride 0.9% 566 mL chemo infusion, 175 mg/m2 = 396 mg, Intravenous, Clinic/[facility] 1 time, 6 of 6 cycles  Dose modification: 140 mg/m2 (80 % of original dose 175 mg/m2, Cycle 5)  Administration: 396 mg (2/28/2019), 396 mg (3/21/2019), 396 mg (4/11/2019), 396 mg (5/7/2019), 318 mg (5/28/2019), 306 mg (6/18/2019)     10/9/2019 -  Chemotherapy    Treatment Summary   Plan Name: OP BEVACIZUMAB Q3W   Treatment Goal: Maintenance  Status: Active  Start Date: 10/9/2019  End Date: 12/21/2020 (Planned)  Provider: Oscar Mckeon MD  Chemotherapy: bevacizumab (AVASTIN) 15 mg/kg = 1,615 mg in sodium chloride 0.9% 100 mL chemo infusion, 15 mg/kg = 1,615 mg, Intravenous, Clinic/[facility] 1 time, 6 of 17 cycles  Administration: 1,615 mg (10/9/2019), 1,615 mg (10/29/2019), 1,615 mg (12/10/2019), 1,615 mg (1/21/2020), 1,600 mg (4/2/2020), 1,615 mg (4/23/2020)     Malignant neoplasm of both ovaries   2/18/2019 Initial Diagnosis    Ovarian cancer     10/9/2019 -  Chemotherapy    Treatment Summary   Plan Name: OP BEVACIZUMAB Q3W   Treatment Goal: Maintenance  Status: Active  Start Date: 10/9/2019  End Date: 12/21/2020 (Planned)  Provider: Oscar Mckeon MD  Chemotherapy: bevacizumab (AVASTIN) 15 mg/kg = 1,615 mg in sodium chloride 0.9% 100 mL chemo infusion, 15 mg/kg = 1,615 mg, Intravenous, Clinic/[facility] 1 time, 6 of 17 cycles  Administration: 1,615 mg (10/9/2019), 1,615 mg (10/29/2019), 1,615 mg (12/10/2019), 1,615 mg (1/21/2020), 1,600 mg (4/2/2020), 1,615 mg (4/23/2020)     Ovarian cancer, bilateral   8/15/2019  Initial Diagnosis    Ovarian cancer, bilateral     10/9/2019 -  Chemotherapy    Treatment Summary   Plan Name: OP BEVACIZUMAB Q3W   Treatment Goal: Maintenance  Status: Active  Start Date: 10/9/2019  End Date: 2020 (Planned)  Provider: Oscar Mckeon MD  Chemotherapy: bevacizumab (AVASTIN) 15 mg/kg = 1,615 mg in sodium chloride 0.9% 100 mL chemo infusion, 15 mg/kg = 1,615 mg, Intravenous, Clinic/[facility] 1 time, 6 of 17 cycles  Administration: 1,615 mg (10/9/2019), 1,615 mg (10/29/2019), 1,615 mg (12/10/2019), 1,615 mg (2020), 1,600 mg (2020), 1,615 mg (2020)       Past Medical History:   Diagnosis Date    Anemia     Anxiety     Arthritis     knees    Cancer     ovarian    CHF (congestive heart failure)     Hx antineoplastic chemotherapy     last 2019    Hyperlipemia     Hypertension     Neck pain     Ovarian cancer     CHEMO     Family History   Problem Relation Age of Onset    Anesthesia problems Other     Breast cancer Maternal Aunt     Breast cancer Paternal Aunt     Ovarian cancer Paternal Aunt     Colon cancer Brother     Thrombophilia Neg Hx      Social History     Socioeconomic History    Marital status:      Spouse name: Not on file    Number of children: Not on file    Years of education: Not on file    Highest education level: Not on file   Occupational History    Not on file   Social Needs    Financial resource strain: Not on file    Food insecurity     Worry: Not on file     Inability: Not on file    Transportation needs     Medical: Not on file     Non-medical: Not on file   Tobacco Use    Smoking status: Former Smoker     Packs/day: 1.00     Years: 25.00     Pack years: 25.00     Quit date: 10/1/2018     Years since quittin.9    Smokeless tobacco: Never Used    Tobacco comment: States started quit 2 months ago after 30 years   Substance and Sexual Activity    Alcohol use: Never     Alcohol/week: 0.0 standard drinks     Frequency: Never      Comment: occasionally  No alcohol 72h prior to sx    Drug use: No    Sexual activity: Not Currently     Partners: Male     Comment: hyst; mut monog   Lifestyle    Physical activity     Days per week: Not on file     Minutes per session: Not on file    Stress: Only a little   Relationships    Social connections     Talks on phone: Not on file     Gets together: Not on file     Attends [employer] service: Not on file     Active member of club or organization: Not on file     Attends meetings of clubs or organizations: Not on file     Relationship status: Not on file   Other Topics Concern    Not on file   Social History Narrative    Live w/ spouse and 2 dogs      Past Surgical History:   Procedure Laterality Date    breast reduction  10/02/2018     SECTION      X 1    CYSTOSCOPY W/ URETERAL STENT PLACEMENT Right 2019    Procedure: CYSTOSCOPY, WITH URETERAL STENT INSERTION;  Surgeon: Timmy Santiago IV, MD;  Location: [facility] OR;  Service: Urology;  Laterality: Right;    CYSTOSCOPY W/ URETERAL STENT REMOVAL  10/04/2019    DILATION AND CURETTAGE OF UTERUS      HYSTERECTOMY      RALH for fibroids (still has ovaries)    INSERTION OF VENOUS ACCESS PORT Left 2019    Procedure: INSERTION, VENOUS ACCESS PORT;  Surgeon: Ulisses Monzon MD;  Location: [facility] OR;  Service: General;  Laterality: Left;  Left internal jugular     LYSIS OF ADHESIONS OF URETER N/A 8/15/2019    Procedure: URETEROLYSIS;  Surgeon: Ismael Juarez MD;  Location: [facility] OR;  Service: OB/GYN;  Laterality: N/A;    OMENTECTOMY N/A 8/15/2019    Procedure: OMENTECTOMY;  Surgeon: Ismael Juarez MD;  Location: [facility] OR;  Service: OB/GYN;  Laterality: N/A;    RETROGRADE PYELOGRAPHY Right 2019    Procedure: PYELOGRAM, RETROGRADE;  Surgeon: Timmy Santiago IV, MD;  Location: [facility] OR;  Service: Urology;  Laterality: Right;    ROBOT-ASSISTED LAPAROSCOPIC SALPINGO-OOPHORECTOMY USING DA TIA XI Bilateral 8/15/2019    Procedure: XI  ROBOTIC SALPINGO-OOPHORECTOMY;  Surgeon: Ismael Juarez MD;  Location: [facility];  Service: OB/GYN;  Laterality: Bilateral;    TOTAL REDUCTION MAMMOPLASTY  2018    TUBAL LIGATION       Current Outpatient Medications   Medication Sig Dispense Refill    albuterol 90 mcg/actuation inhaler Inhale 2 puffs into the lungs every 4 (four) hours as needed for Wheezing. Rescue 18 g 0    ALPRAZolam (XANAX) 0.25 MG tablet Take 1 tablet (0.25 mg total) by mouth 3 (three) times daily as needed for Anxiety. 60 tablet 2    amLODIPine (NORVASC) 5 MG tablet Take 2 tablets (10 mg total) by mouth once daily. 60 tablet 11    baclofen (LIORESAL) 10 MG tablet Take 1 tablet (10 mg total) by mouth 3 (three) times daily as needed (muscle pain). 20 tablet 0    betamethasone valerate 0.1% (VALISONE) 0.1 % Crea Apply topically 2 (two) times daily. 45 g 1    biotin 1 mg tablet Take 1,000 mcg by mouth once daily.       cetirizine (ZYRTEC) 10 MG tablet Take 1 tablet (10 mg total) by mouth once daily. 30 tablet 5    diclofenac sodium (VOLTAREN) 1 % Gel Apply once a day to back as needed 100 g 1    diphenhydrAMINE-zinc acetate 1-0.1% (BENADRYL ITCH STOPPING) cream Apply topically 2 (two) times daily as needed for Itching. 28 g 0    EPINEPHrine (EPIPEN) 0.3 mg/0.3 mL AtIn Inject 0.3 mLs (0.3 mg total) into the muscle once. for 1 dose 2 each 0    gabapentin (NEURONTIN) 100 MG capsule Take 1 capsule (100 mg total) by mouth 3 (three) times daily. 90 capsule 11    HYDROcodone-acetaminophen (NORCO)  mg per tablet Take 1 tablet by mouth every 4 (four) hours as needed for Pain. 15 tablet 0    hydrOXYzine HCL (ATARAX) 25 MG tablet Take 1 tablet (25 mg total) by mouth 3 (three) times daily as needed for Itching. 90 tablet 1    multivitamin with minerals tablet Take 1 tablet by mouth once daily.       olmesartan-hydrochlorothiazide (BENICAR HCT) 40-12.5 mg Tab Take 1 tablet by mouth once daily. 90 tablet 0    oxyCODONE-acetaminophen  (PERCOCET)  mg per tablet Take 1 tablet by mouth every 8 (eight) hours as needed. 20 tablet 0    rosuvastatin (CRESTOR) 10 MG tablet Take 1 tablet (10 mg total) by mouth once daily. 90 tablet 1    sertraline (ZOLOFT) 25 MG tablet Take 1 tablet (25 mg total) by mouth once daily. 30 tablet 11    triamcinolone acetonide 0.1% (KENALOG) 0.1 % cream Apply topically 2 (two) times daily. 15 g 0    zinc gluconate 50 mg tablet Take 50 mg by mouth once daily.       No current facility-administered medications for this visit.        Labs:  Lab Results   Component Value Date    WBC 9.01 09/17/2020    HGB 12.6 09/17/2020    HCT 41.0 09/17/2020    MCV 90 09/17/2020     09/17/2020     BMP  Lab Results   Component Value Date     09/17/2020    K 3.4 (L) 09/17/2020     09/17/2020    CO2 27 09/17/2020    BUN 11 09/17/2020    CREATININE 0.9 09/17/2020    CALCIUM 9.7 09/17/2020    ANIONGAP 10 09/17/2020    ESTGFRAFRICA >60 09/17/2020    EGFRNONAA >60 09/17/2020     Lab Results   Component Value Date    ALT 26 09/17/2020    AST 23 09/17/2020    ALKPHOS 108 09/17/2020    BILITOT 0.5 09/17/2020       No results found for: IRON, TIBC, FERRITIN, SATURATEDIRO  No results found for: DHOBTAGD65  No results found for: FOLATE  Lab Results   Component Value Date    TSH 1.869 04/19/2013       I have reviewed the radiology reports and examined the scan/xray images.    Review of Systems   Constitutional: Negative.    HENT: Negative.    Eyes: Negative.    Respiratory: Negative.    Cardiovascular: Negative.    Gastrointestinal: Negative.    Endocrine: Negative.    Genitourinary: Negative.    Musculoskeletal: Negative.    Skin: Negative.    Allergic/Immunologic: Negative.    Neurological: Negative.    Hematological: Negative.    Psychiatric/Behavioral: Negative.      ECOG SCORE    0 - Fully active-able to carry on all pre-disease performance without restriction            Objective:     Vitals:    09/24/20 1039   BP: (!)  146/84   Pulse: 87   Temp: 98 °F (36.7 °C)   Body mass index is 40.26 kg/m².  Physical Exam  Vitals signs and nursing note reviewed.   Constitutional:       Appearance: She is well-developed.   HENT:      Head: Normocephalic and atraumatic.   Eyes:      Conjunctiva/sclera: Conjunctivae normal.   Neck:      Musculoskeletal: Normal range of motion and neck supple.   Cardiovascular:      Rate and Rhythm: Normal rate and regular rhythm.   Pulmonary:      Effort: Pulmonary effort is normal.      Breath sounds: Normal breath sounds.   Abdominal:      General: Bowel sounds are normal.      Palpations: Abdomen is soft.   Musculoskeletal: Normal range of motion.   Skin:     General: Skin is warm and dry.   Neurological:      Mental Status: She is alert and oriented to person, place, and time.   Psychiatric:         Behavior: Behavior normal.         Thought Content: Thought content normal.         Judgment: Judgment normal.           Assessment:      1. Ovarian cancer, bilateral    2. Peritoneal carcinomatosis    3. Other constipation    4. Malignant neoplasm of ovary, unspecified laterality           Plan:     Ovarian cancer, bilateral  Continue on maintenance Avastin  -     CT Abdomen Pelvis  Without Contrast; Future; Expected date: 09/24/2020    Peritoneal carcinomatosis  -     CT Abdomen Pelvis  Without Contrast; Future; Expected date: 09/24/2020    Other constipation  -     docusate sodium (COLACE) 100 MG capsule; Take 1 capsule (100 mg total) by mouth 2 (two) times daily as needed for Constipation.  Dispense: 60 capsule; Refill: 1  -     polyethylene glycol (GLYCOLAX) 17 gram PwPk; Take 17 g by mouth daily as needed (constipation).  Dispense: 30 each; Refill: 0    Malignant neoplasm of ovary, unspecified laterality  -     ALPRAZolam (XANAX) 0.25 MG tablet; Take 1 tablet (0.25 mg total) by mouth 3 (three) times daily as needed for Anxiety.  Dispense: 60 tablet; Refill: 2

## 2020-09-22 ENCOUNTER — OFFICE VISIT (OUTPATIENT)
Dept: INTERNAL MEDICINE | Facility: CLINIC | Age: 65
End: 2020-09-22
Payer: MEDICARE

## 2020-09-22 VITALS
HEART RATE: 97 BPM | OXYGEN SATURATION: 96 % | BODY MASS INDEX: 40.9 KG/M2 | TEMPERATURE: 99 F | HEIGHT: 67 IN | DIASTOLIC BLOOD PRESSURE: 70 MMHG | WEIGHT: 260.56 LBS | SYSTOLIC BLOOD PRESSURE: 128 MMHG

## 2020-09-22 DIAGNOSIS — M50.30 DDD (DEGENERATIVE DISC DISEASE), CERVICAL: Primary | ICD-10-CM

## 2020-09-22 DIAGNOSIS — Z23 NEED FOR PNEUMOCOCCAL VACCINATION: ICD-10-CM

## 2020-09-22 DIAGNOSIS — R09.82 PND (POST-NASAL DRIP): ICD-10-CM

## 2020-09-22 PROCEDURE — G0009 ADMIN PNEUMOCOCCAL VACCINE: HCPCS | Mod: PBBFAC

## 2020-09-22 PROCEDURE — 99999 PR PBB SHADOW E&M-EST. PATIENT-LVL IV: CPT | Mod: PBBFAC,,, | Performed by: FAMILY MEDICINE

## 2020-09-22 PROCEDURE — 99214 OFFICE O/P EST MOD 30 MIN: CPT | Mod: S$PBB,,, | Performed by: FAMILY MEDICINE

## 2020-09-22 PROCEDURE — 99214 OFFICE O/P EST MOD 30 MIN: CPT | Mod: PBBFAC | Performed by: FAMILY MEDICINE

## 2020-09-22 PROCEDURE — 99214 PR OFFICE/OUTPT VISIT, EST, LEVL IV, 30-39 MIN: ICD-10-PCS | Mod: S$PBB,,, | Performed by: FAMILY MEDICINE

## 2020-09-22 PROCEDURE — 99999 PR PBB SHADOW E&M-EST. PATIENT-LVL IV: ICD-10-PCS | Mod: PBBFAC,,, | Performed by: FAMILY MEDICINE

## 2020-09-22 NOTE — PROGRESS NOTES
"Casie Frias Givens  09/22/2020  [id number]    Rossana Ang MD  Patient Care Team:  Rossana Ang MD as PCP - General (Family Medicine)  Radha Foley LPN as Care Coordinator (Internal Medicine)  iRta Sawant PharmD as Pharmacist (Oncology)  Baltazar Hernández as Digital Medicine Health   Gerri Meyers RD as Dietitian (Nutrition)  Has the patient seen any provider outside of the [facility] network since the last visit? (no). If yes, HIPPA forms completed and records requested.        Visit Type:an urgent visit for a new problem    Chief Complaint:  Chief Complaint   Patient presents with    Lymphadenopathy     swelling in her glands. she said it started on the 9/14/2020       History of Present Illness:  64 year old here for acute issue. She is concerned with infection of her glands.  She went to ER for a "crook" in her neck. More on right side of neck.   She reports that wouldn't go away,.   She had CT of Neck, no LAD.  She reports that she has a PND, with a strange taste.   She feels that she has "infection" somewhere.  She reports more of PND.  She reports over weekend she had felt LAD on right side after ER visit.      She was seen by Janki Oneil yesterday, ortho for pain.  She was in the emergency room on 08/30/2020 for the left shoulder.  They gave her a Medrol Dosepak.  She says that that completely resolved her symptoms on the left and she is not having trouble at this time.  She comes today due to orthopedic referral following that ER evaluation.  However, she had a different evaluation in the ER last week on 09/17/2020.  They diagnosed her with a cervical strain and advised her to follow-up with Dr. Shantanu Fairbanks.  She does not have an appointment scheduled with him yet.  Pain level is 10/10.  Quality is aching and constant.  She points to the right trapezial area and paraspinal musculature on the right side is to wear the pain is located presently.  She denies radicular symptoms.  " Quality is aching and constant.  She was given a prescription for Norco as well as baclofen.  She says those only put her to sleep.  Aggravating factors include movement of the head and neck.  She noticed she had some discolored sputum and is concerned that some of this may be related to infection.    She is followed by Dr. Mckeon, she had    peritoneal carcinomatosis with malignant right pleural effusion.  Final pathology is consistent with ovarian primary with  2740.  PET scan demonstrates multiple omental and peritoneal avid masses consistent with peritoneal carcinomatosis, extensive retroperitoneal and mediastinal lymphadenopathy, masslike structure in the right iliac fossa likely representing ovarian mass.  Patient has been evaluated by GYN Oncology Dr. Juarez and MD Green with recommendations for him carboplatin/Taxol/Avastin.      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  S/p 6 cycle avastin taxol carboplatin.  Has great NC after 6 cycles. On 8/15/2019 underwent salpingoophorectomy. Pathology showed CR. Currently on maintenance avastin. Infusion on 9/3/2020    CBC five days ago, normal WBC normal H and H.    History:  Past Medical History:   Diagnosis Date    Anemia     Anxiety     Arthritis     knees    Cancer     ovarian    CHF (congestive heart failure)     Hx antineoplastic chemotherapy     last 2019    Hyperlipemia     Hypertension     Neck pain     Ovarian cancer 2019    CHEMO     Past Surgical History:   Procedure Laterality Date    breast reduction  10/02/2018     SECTION      X 1    CYSTOSCOPY W/ URETERAL STENT PLACEMENT Right 2019    Procedure: CYSTOSCOPY, WITH URETERAL STENT INSERTION;  Surgeon: Timmy Santiago IV, MD;  Location[facility];  Service: Urology;  Laterality: Right;    CYSTOSCOPY W/ URETERAL STENT REMOVAL  10/04/2019    DILATION AND CURETTAGE OF UTERUS      HYSTERECTOMY      RALH for fibroids (still has ovaries)     INSERTION OF VENOUS ACCESS PORT Left 2019    Procedure: INSERTION, VENOUS ACCESS PORT;  Surgeon: Ulisses Monzon MD;  Location: [facility];  Service: General;  Laterality: Left;  Left internal jugular     LYSIS OF ADHESIONS OF URETER N/A 8/15/2019    Procedure: URETEROLYSIS;  Surgeon: Ismael Juarez MD;  Location: [facility] OR;  Service: OB/GYN;  Laterality: N/A;    OMENTECTOMY N/A 8/15/2019    Procedure: OMENTECTOMY;  Surgeon: Ismael Juarez MD;  Location: [facility] OR;  Service: OB/GYN;  Laterality: N/A;    RETROGRADE PYELOGRAPHY Right 2019    Procedure: PYELOGRAM, RETROGRADE;  Surgeon: Timmy Santiago IV, MD;  Location: [facility] OR;  Service: Urology;  Laterality: Right;    ROBOT-ASSISTED LAPAROSCOPIC SALPINGO-OOPHORECTOMY USING DA TIA XI Bilateral 8/15/2019    Procedure: XI ROBOTIC SALPINGO-OOPHORECTOMY;  Surgeon: Ismael Juarez MD;  Location: [facility];  Service: OB/GYN;  Laterality: Bilateral;    TOTAL REDUCTION MAMMOPLASTY  2018    TUBAL LIGATION       Family History   Problem Relation Age of Onset    Anesthesia problems Other     Breast cancer Maternal Aunt     Breast cancer Paternal Aunt     Ovarian cancer Paternal Aunt     Colon cancer Brother     Thrombophilia Neg Hx      Social History     Socioeconomic History    Marital status:      Spouse name: Not on file    Number of children: Not on file    Years of education: Not on file    Highest education level: Not on file   Occupational History    Not on file   Social Needs    Financial resource strain: Not on file    Food insecurity     Worry: Not on file     Inability: Not on file    Transportation needs     Medical: Not on file     Non-medical: Not on file   Tobacco Use    Smoking status: Former Smoker     Packs/day: 1.00     Years: 25.00     Pack years: 25.00     Quit date: 10/1/2018     Years since quittin.9    Smokeless tobacco: Never Used    Tobacco comment: States started quit 2 months ago after 30 years   Substance and  Sexual Activity    Alcohol use: Never     Alcohol/week: 0.0 standard drinks     Frequency: Never     Comment: occasionally  No alcohol 72h prior to sx    Drug use: No    Sexual activity: Not Currently     Partners: Male     Comment: hyst; mut monog   Lifestyle    Physical activity     Days per week: Not on file     Minutes per session: Not on file    Stress: Only a little   Relationships    Social connections     Talks on phone: Not on file     Gets together: Not on file     Attends Gnosticism service: Not on file     Active member of club or organization: Not on file     Attends meetings of clubs or organizations: Not on file     Relationship status: Not on file   Other Topics Concern    Not on file   Social History Narrative    Live w/ spouse and 2 dogs      Patient Active Problem List   Diagnosis    Hypertension    Osteoarthritis of both knees    History of CHF (congestive heart failure)    Hyperlipidemia    Peritoneal carcinomatosis    Morbid obesity    Status post placement of ureteral stent    Preop cardiovascular exam    Abnormal ECG    Malignant neoplasm of right ovary    Malignant neoplasm of both ovaries    Pulmonary heart disease, unspecified    Ovarian cancer, bilateral    S/P BSO (bilateral salpingo-oophorectomy)    Encounter for antineoplastic chemotherapy    Anxiety    DDD (degenerative disc disease), cervical     Review of patient's allergies indicates:  No Known Allergies    The following were reviewed at this visit: active problem list, medication list, allergies, family history, social history, and health maintenance.    Medications:  Current Outpatient Medications on File Prior to Visit   Medication Sig Dispense Refill    albuterol 90 mcg/actuation inhaler Inhale 2 puffs into the lungs every 4 (four) hours as needed for Wheezing. Rescue 18 g 0    ALPRAZolam (XANAX) 0.25 MG tablet Take 1 tablet (0.25 mg total) by mouth 3 (three) times daily as needed for Anxiety. 60 tablet  2    amLODIPine (NORVASC) 5 MG tablet Take 2 tablets (10 mg total) by mouth once daily. 60 tablet 11    biotin 1 mg tablet Take 1,000 mcg by mouth once daily.       cetirizine (ZYRTEC) 10 MG tablet Take 1 tablet (10 mg total) by mouth once daily. 30 tablet 5    diclofenac sodium (VOLTAREN) 1 % Gel Apply once a day to back as needed 100 g 1    EPINEPHrine (EPIPEN) 0.3 mg/0.3 mL AtIn Inject 0.3 mLs (0.3 mg total) into the muscle once. for 1 dose 2 each 0    gabapentin (NEURONTIN) 100 MG capsule Take 1 capsule (100 mg total) by mouth 3 (three) times daily. 90 capsule 11    HYDROcodone-acetaminophen (NORCO)  mg per tablet Take 1 tablet by mouth every 4 (four) hours as needed for Pain. 15 tablet 0    hydrOXYzine HCL (ATARAX) 25 MG tablet Take 1 tablet (25 mg total) by mouth 3 (three) times daily as needed for Itching. 90 tablet 1    methocarbamoL (ROBAXIN) 500 MG Tab Take 1 tablet (500 mg total) by mouth 3 (three) times daily as needed (muscle spasms). 60 tablet 0    multivitamin with minerals tablet Take 1 tablet by mouth once daily.       olmesartan-hydrochlorothiazide (BENICAR HCT) 40-12.5 mg Tab Take 1 tablet by mouth once daily. 90 tablet 0    rosuvastatin (CRESTOR) 10 MG tablet Take 1 tablet (10 mg total) by mouth once daily. 90 tablet 1    sertraline (ZOLOFT) 25 MG tablet Take 1 tablet (25 mg total) by mouth once daily. 30 tablet 11    zinc gluconate 50 mg tablet Take 50 mg by mouth once daily.      [DISCONTINUED] baclofen (LIORESAL) 10 MG tablet Take 1 tablet (10 mg total) by mouth 3 (three) times daily as needed (muscle pain). (Patient not taking: Reported on 9/22/2020) 20 tablet 0    [DISCONTINUED] betamethasone valerate 0.1% (VALISONE) 0.1 % Crea Apply topically 2 (two) times daily. (Patient not taking: Reported on 9/22/2020) 45 g 1    [DISCONTINUED] diphenhydrAMINE-zinc acetate 1-0.1% (BENADRYL ITCH STOPPING) cream Apply topically 2 (two) times daily as needed for Itching. (Patient not  taking: Reported on 9/22/2020) 28 g 0    [DISCONTINUED] oxyCODONE-acetaminophen (PERCOCET)  mg per tablet Take 1 tablet by mouth every 8 (eight) hours as needed. (Patient not taking: Reported on 9/22/2020) 20 tablet 0    [DISCONTINUED] triamcinolone acetonide 0.1% (KENALOG) 0.1 % cream Apply topically 2 (two) times daily. (Patient not taking: Reported on 9/21/2020) 15 g 0     No current facility-administered medications on file prior to visit.        Medications have been reviewed and reconciled with patient at this visit.  Barriers to medications present (no)    Adverse reactions to current medications (no)    Over the counter medications reviewed (Yes ), and if needed added to active Medication list at this visit.     Exam:  Wt Readings from Last 3 Encounters:   09/22/20 118.2 kg (260 lb 9.3 oz)   09/21/20 118.8 kg (261 lb 14.5 oz)   09/17/20 118.8 kg (261 lb 14.5 oz)     Temp Readings from Last 3 Encounters:   09/22/20 98.5 °F (36.9 °C) (Tympanic)   09/17/20 98.6 °F (37 °C) (Oral)   09/03/20 98.3 °F (36.8 °C)     BP Readings from Last 3 Encounters:   09/22/20 128/70   09/21/20 137/78   09/17/20 (!) 172/78     Pulse Readings from Last 3 Encounters:   09/22/20 97   09/21/20 81   09/17/20 98     Body mass index is 40.81 kg/m².      Review of Systems   Constitutional: Negative.  Negative for chills and fever.   HENT: Negative.  Negative for congestion, sinus pain and sore throat.    Eyes: Negative for blurred vision and double vision.   Respiratory: Negative for cough, sputum production, shortness of breath and wheezing.    Cardiovascular: Negative for chest pain, palpitations and leg swelling.   Gastrointestinal: Negative for abdominal pain, constipation, diarrhea, heartburn, nausea and vomiting.   Genitourinary: Negative.    Musculoskeletal: Positive for neck pain.   Skin: Negative.  Negative for rash.   Neurological: Negative.    Endo/Heme/Allergies: Negative.  Negative for polydipsia. Does not  bruise/bleed easily.   Psychiatric/Behavioral: Negative for depression and substance abuse.     Physical Exam  Vitals signs and nursing note reviewed.   Constitutional:       General: She is not in acute distress.     Appearance: She is well-developed. She is not diaphoretic.   HENT:      Head: Normocephalic and atraumatic.      Right Ear: External ear normal.      Left Ear: External ear normal.      Nose: Nose normal.      Mouth/Throat:      Pharynx: No oropharyngeal exudate.   Eyes:      General:         Right eye: No discharge.         Left eye: No discharge.      Conjunctiva/sclera: Conjunctivae normal.      Pupils: Pupils are equal, round, and reactive to light.   Neck:      Musculoskeletal: Normal range of motion and neck supple.      Thyroid: No thyromegaly.   Cardiovascular:      Rate and Rhythm: Normal rate and regular rhythm.      Heart sounds: Normal heart sounds. No murmur.   Pulmonary:      Effort: Pulmonary effort is normal. No respiratory distress.      Breath sounds: Normal breath sounds. No wheezing.   Abdominal:      General: Bowel sounds are normal. There is no distension.      Palpations: Abdomen is soft. There is no mass.      Tenderness: There is no abdominal tenderness.   Musculoskeletal: Normal range of motion.         General: Tenderness present.      Cervical back: She exhibits tenderness. She exhibits normal range of motion and no bony tenderness.        Back:    Lymphadenopathy:      Head:      Right side of head: No submental or submandibular adenopathy.      Left side of head: No submental or submandibular adenopathy.      Cervical: No cervical adenopathy.      Upper Body:      Right upper body: No supraclavicular adenopathy.      Left upper body: No supraclavicular adenopathy.   Skin:     Capillary Refill: Capillary refill takes less than 2 seconds.   Neurological:      Mental Status: She is alert and oriented to person, place, and time.      Cranial Nerves: No cranial nerve deficit.    Psychiatric:         Behavior: Behavior normal.         Thought Content: Thought content normal.         Judgment: Judgment normal.         Laboratory Reviewed ({N/A)  Lab Results   Component Value Date    WBC 9.01 09/17/2020    HGB 12.6 09/17/2020    HCT 41.0 09/17/2020     09/17/2020    CHOL 171 02/09/2018    TRIG 40 02/09/2018    HDL 66 02/09/2018    ALT 26 09/17/2020    AST 23 09/17/2020     09/17/2020    K 3.4 (L) 09/17/2020     09/17/2020    CREATININE 0.9 09/17/2020    BUN 11 09/17/2020    CO2 27 09/17/2020    TSH 1.869 04/19/2013    INR 1.0 01/28/2019       Casie was seen today for lymphadenopathy.    Diagnoses and all orders for this visit:    DDD (degenerative disc disease), cervical    PND (post-nasal drip)      I do not see sign of infection  She does have DDD cervical spine, that is causing her neck pain.    Robitussin for PND  NO fever    Pneumovax 23          Care Plan/Goals: Reviewed  (N/A)  Goals        Patient Stated     Blood Pressure < 130/80 (pt-stated)        Other     Exercise at least 150 minutes per week.      Maintain a low sodium diet      American Heart Association (AHA) guidelines recommend less than 1500 mg of dietary salt per day.       Take at least one BP reading per week at various times of the day           Follow up: No follow-ups on file.    After visit summary was printed and given to patient upon discharge today.  Patient goals and care plan are included in After Visit Summary.

## 2020-09-24 ENCOUNTER — CLINICAL SUPPORT (OUTPATIENT)
Dept: REHABILITATION | Facility: HOSPITAL | Age: 65
End: 2020-09-24
Attending: PHYSICIAN ASSISTANT
Payer: MEDICARE

## 2020-09-24 ENCOUNTER — OFFICE VISIT (OUTPATIENT)
Dept: HEMATOLOGY/ONCOLOGY | Facility: CLINIC | Age: 65
End: 2020-09-24
Payer: MEDICARE

## 2020-09-24 ENCOUNTER — INFUSION (OUTPATIENT)
Dept: INFUSION THERAPY | Facility: HOSPITAL | Age: 65
End: 2020-09-24
Attending: INTERNAL MEDICINE
Payer: MEDICARE

## 2020-09-24 ENCOUNTER — HOSPITAL ENCOUNTER (OUTPATIENT)
Dept: RADIOLOGY | Facility: HOSPITAL | Age: 65
Discharge: HOME | End: 2020-09-24
Attending: INTERNAL MEDICINE
Payer: MEDICARE

## 2020-09-24 VITALS
DIASTOLIC BLOOD PRESSURE: 84 MMHG | HEART RATE: 87 BPM | HEIGHT: 67 IN | BODY MASS INDEX: 40.35 KG/M2 | OXYGEN SATURATION: 97 % | WEIGHT: 257.06 LBS | TEMPERATURE: 98 F | SYSTOLIC BLOOD PRESSURE: 146 MMHG

## 2020-09-24 VITALS
DIASTOLIC BLOOD PRESSURE: 81 MMHG | OXYGEN SATURATION: 97 % | HEART RATE: 79 BPM | RESPIRATION RATE: 16 BRPM | SYSTOLIC BLOOD PRESSURE: 145 MMHG | TEMPERATURE: 99 F

## 2020-09-24 DIAGNOSIS — C78.6 PERITONEAL CARCINOMATOSIS: ICD-10-CM

## 2020-09-24 DIAGNOSIS — C56.3 OVARIAN CANCER, BILATERAL: Primary | ICD-10-CM

## 2020-09-24 DIAGNOSIS — C56.9 MALIGNANT NEOPLASM OF OVARY, UNSPECIFIED LATERALITY: ICD-10-CM

## 2020-09-24 DIAGNOSIS — M53.82 NECK MUSCLE WEAKNESS: ICD-10-CM

## 2020-09-24 DIAGNOSIS — C56.1 MALIGNANT NEOPLASM OF RIGHT OVARY: ICD-10-CM

## 2020-09-24 DIAGNOSIS — C56.3 OVARIAN CANCER, BILATERAL: ICD-10-CM

## 2020-09-24 DIAGNOSIS — M54.2 CERVICALGIA: ICD-10-CM

## 2020-09-24 DIAGNOSIS — K59.09 OTHER CONSTIPATION: ICD-10-CM

## 2020-09-24 DIAGNOSIS — C56.3 MALIGNANT NEOPLASM OF BOTH OVARIES: ICD-10-CM

## 2020-09-24 DIAGNOSIS — M79.18 MYOFASCIAL PAIN ON RIGHT SIDE: ICD-10-CM

## 2020-09-24 PROCEDURE — 99999 PR PBB SHADOW E&M-EST. PATIENT-LVL IV: ICD-10-PCS | Mod: PBBFAC,,, | Performed by: INTERNAL MEDICINE

## 2020-09-24 PROCEDURE — 97161 PT EVAL LOW COMPLEX 20 MIN: CPT

## 2020-09-24 PROCEDURE — 99999 PR PBB SHADOW E&M-EST. PATIENT-LVL IV: CPT | Mod: PBBFAC,,, | Performed by: INTERNAL MEDICINE

## 2020-09-24 PROCEDURE — 99214 OFFICE O/P EST MOD 30 MIN: CPT | Mod: PBBFAC,25 | Performed by: INTERNAL MEDICINE

## 2020-09-24 PROCEDURE — 63600175 PHARM REV CODE 636 W HCPCS: Mod: JG | Performed by: INTERNAL MEDICINE

## 2020-09-24 PROCEDURE — 99215 OFFICE O/P EST HI 40 MIN: CPT | Mod: 25,S$PBB,, | Performed by: INTERNAL MEDICINE

## 2020-09-24 PROCEDURE — 97110 THERAPEUTIC EXERCISES: CPT

## 2020-09-24 PROCEDURE — 99215 PR OFFICE/OUTPT VISIT, EST, LEVL V, 40-54 MIN: ICD-10-PCS | Mod: 25,S$PBB,, | Performed by: INTERNAL MEDICINE

## 2020-09-24 PROCEDURE — 74176 CT ABD & PELVIS W/O CONTRAST: CPT | Mod: TC

## 2020-09-24 PROCEDURE — 96413 CHEMO IV INFUSION 1 HR: CPT

## 2020-09-24 PROCEDURE — 25000003 PHARM REV CODE 250: Performed by: INTERNAL MEDICINE

## 2020-09-24 RX ORDER — ALPRAZOLAM 0.25 MG/1
0.25 TABLET ORAL 3 TIMES DAILY PRN
Qty: 60 TABLET | Refills: 2 | Status: SHIPPED | OUTPATIENT
Start: 2020-09-24 | End: 2021-06-04 | Stop reason: SDUPTHER

## 2020-09-24 RX ORDER — SODIUM CHLORIDE 0.9 % (FLUSH) 0.9 %
10 SYRINGE (ML) INJECTION
Status: CANCELLED | OUTPATIENT
Start: 2020-09-24

## 2020-09-24 RX ORDER — METHYLPREDNISOLONE SOD SUCC 125 MG
125 VIAL (EA) INJECTION ONCE AS NEEDED
Status: CANCELLED | OUTPATIENT
Start: 2020-09-24

## 2020-09-24 RX ORDER — DIPHENHYDRAMINE HYDROCHLORIDE 50 MG/ML
25 INJECTION INTRAMUSCULAR; INTRAVENOUS EVERY 10 MIN PRN
Status: CANCELLED | OUTPATIENT
Start: 2020-09-24

## 2020-09-24 RX ORDER — EPINEPHRINE 0.3 MG/.3ML
0.3 INJECTION SUBCUTANEOUS ONCE AS NEEDED
Status: CANCELLED | OUTPATIENT
Start: 2020-09-24

## 2020-09-24 RX ORDER — POLYETHYLENE GLYCOL 3350 17 G/17G
17 POWDER, FOR SOLUTION ORAL DAILY PRN
Qty: 510 G | Refills: 0 | Status: SHIPPED | OUTPATIENT
Start: 2020-09-24 | End: 2022-12-12

## 2020-09-24 RX ORDER — DOCUSATE SODIUM 100 MG/1
100 CAPSULE, LIQUID FILLED ORAL 2 TIMES DAILY PRN
Qty: 60 CAPSULE | Refills: 1 | Status: SHIPPED | OUTPATIENT
Start: 2020-09-24 | End: 2021-09-24

## 2020-09-24 RX ORDER — HEPARIN 100 UNIT/ML
500 SYRINGE INTRAVENOUS
Status: CANCELLED | OUTPATIENT
Start: 2020-09-24

## 2020-09-24 RX ORDER — HEPARIN 100 UNIT/ML
500 SYRINGE INTRAVENOUS
Status: DISCONTINUED | OUTPATIENT
Start: 2020-09-24 | End: 2020-09-24 | Stop reason: HOSPADM

## 2020-09-24 RX ADMIN — BEVACIZUMAB 1795 MG: 400 INJECTION, SOLUTION INTRAVENOUS at 11:09

## 2020-09-24 RX ADMIN — HEPARIN 500 UNITS: 100 SYRINGE at 12:09
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
## 2020-09-24 NOTE — DISCHARGE INSTRUCTIONS
[facility]  [id number] [facility]  [address]  [phone] phone     [phone] fax  Hours of Operation: Monday- Friday 8:00am- 5:00pm  After hours phone  [phone]  Hematology / Oncology Physicians on call      ASHLEIGH Russ Dr., Dr., Dr., Dr., NP Sydney Prescott, NP Tyesha Taylor, NP    Please call with any concerns regarding your appointment today.    HOME CARE AFTER CHEMOTHERAPY   Meals   Many patients feel sick and lose their appetites during treatment. Eat small meals several times a day. Choose bland foods with little taste or smell if you have problems with nausea. Be sure to cook all food thoroughly. This kills bacteria and helps you avoid intestinal infection. Soft foods are easier to swallow and digest.   Activity   Exercise keeps you strong and keeps your heart and lungs active. Talk to your doctor about an appropriate exercise program for you.   Skin Care   To prevent a skin infection, bathe or shower once a day. Use a moisturizing soap and wash with warm water. Avoid very hot or cold water. Chemotherapy can make your skin dry . Apply moisturizing lotion to help relieve dry skin. Some drugs used in high doses can cause slight burns to appear (like sunburn). Ask for a special cream to help relieve the burn and protect your skin.   Prevent Mouth Sores   During chemotherapy, many people get mouth sores. Do the following to help prevent mouth sores or to ease discomfort.   Brush your teeth with a soft-bristle toothbrush after every meal.  Don't use dental floss if your platelet count is below 50,000. Your doctor or nurse will tell you if this is the case.  Use an oral swab or special soft toothbrush if your gums bleed during regular brushing.  Use mouthwash as directed. If you can't tolerate commercial mouthwash, use salt and baking soda to clean your mouth. Mix 1 teaspoon of salt and 1  teaspoon of baking soda into a glass of water. Swish and spit.  Call your doctor or return to this facility if you develop any of the following:   Sore throat   White patches in the mouth or throat   Fever of 100.4ºF (38ºC) or higher, or as directed by your healthcare provider  © 2000-2011 [facility], [address] [place] [address]. All rights reserved. This information is not intended as a substitute for professional medical care. Always follow your healthcare professional's   FALL PREVENTION   Falls often occur due to slipping, tripping or losing your balance. Here are ways to reduce your risk of falling again.   Was there anything that caused your fall that can be fixed, removed or replaced?   Make your home safe by keeping walkways clear of objects you may trip over.   Use non-slip pads under rugs.   Do not walk in poorly lit areas.   Do not stand on chairs or wobbly ladders.   Use caution when reaching overhead or looking upward. This position can cause a loss of balance.   Be sure your shoes fit properly, have non-slip bottoms and are in good condition.   Be cautious when going up and down stairs, curbs, and when walking on uneven sidewalks.   If your balance is poor, consider using a cane or walker.   If your fall was related to alcohol use, stop or limit alcohol intake.   If your fall was related to use of sleeping medicines, talk to your doctor about this. You may need to reduce your dosage at bedtime if you awaken during the night to go to the bathroom.   To reduce the need for nighttime bathroom trips:   Avoid drinking fluids for several hours before going to bed   Empty your bladder before going to bed   Men can keep a urinal at the bedside   © 2000-2011 [facility], [address] [place] [address]. All rights reserved. This information is not intended as a substitute for professional medical care. Always follow your healthcare professional's instructions.  WAYS TO HELP  PREVENT INFECTION         WASH YOUR HANDS OFTEN DURING THE DAY, ESPECIALLY BEFORE YOU EAT, AFTER USING THE BATHROOM, AND AFTER TOUCHING ANIMALS     STAY AWAY FROM PEOPLE WHO HAVE ILLNESSES YOU CAN CATCH; SUCH AS COLDS, FLU, CHICKEN POX     TRY TO AVOID CROWDS     STAY AWAY FROM CHILDREN WHO RECENTLY HAVE RECEIVED LIVE VIRUS VACCINES     MAINTAIN GOOD MOUTH CARE     DO NOT SQUEEZE OR SCRATCH PIMPLES     CLEAN CUTS & SCRAPES RIGHT AWAY AND DAILY UNTIL HEALED WITH WARM WATER, SOAP & AN ANTISEPTIC     AVOID CONTACT WITH LITTER BOXES, BIRD CAGES, & FISH TANKS     AVOID STANDING WATER, IE., BIRD BATHS, FLOWER POTS/VASES, OR HUMIDIFIERS     WEAR GLOVES WHEN GARDENING OR CLEANING UP AFTER OTHERS, ESPECIALLY BABIES & SMALL CHILDREN     DO NOT EAT RAW FISH, SEAFOOD, MEAT, OR EGGS

## 2020-09-25 PROBLEM — M53.82 NECK MUSCLE WEAKNESS: Status: ACTIVE | Noted: 2020-09-25

## 2020-09-28 ENCOUNTER — CLINICAL SUPPORT (OUTPATIENT)
Dept: REHABILITATION | Facility: HOSPITAL | Age: 65
End: 2020-09-28
Attending: PHYSICIAN ASSISTANT
Payer: MEDICARE

## 2020-09-28 DIAGNOSIS — M54.2 CERVICALGIA: Primary | ICD-10-CM

## 2020-09-28 DIAGNOSIS — M53.82 NECK MUSCLE WEAKNESS: ICD-10-CM

## 2020-09-28 DIAGNOSIS — M79.18 MYOFASCIAL PAIN ON RIGHT SIDE: ICD-10-CM

## 2020-09-28 PROCEDURE — 97110 THERAPEUTIC EXERCISES: CPT

## 2020-09-28 PROCEDURE — 97140 MANUAL THERAPY 1/> REGIONS: CPT

## 2020-09-28 PROCEDURE — 97014 ELECTRIC STIMULATION THERAPY: CPT

## 2020-09-28 NOTE — PROGRESS NOTES
Physical Therapy Treatment Note     Name: Casie Frias [facility]  Clinic Number: [id number]    Therapy Diagnosis:   Encounter Diagnoses   Name Primary?    Cervicalgia Yes    Myofascial pain on right side     Neck muscle weakness      Physician: Janki Oneil,*    Visit Date: 9/28/2020      Physician Orders: PT Eval and Treat   Medical Diagnosis from Referral:   M54.2 (ICD-10-CM) - Cervicalgia   M79.18 (ICD-10-CM) - Myofascial pain on right side         Evaluation Date: 9/24/2020  Authorization Period Expiration: 11/16/20  Plan of Care Expiration: 10/24/20  Visit # / Visits authorized: 2/ 12        Time In: 9:30  Time Out: 10:30  Total Billable Time: 23 minutes    Precautions: Anxiety, heart disease, history of cancer    Subjective     Pt reports: less pain after evaluation but pain returned.  Pt. Reports feeling that she has been sleeping better.   She was compliant with home exercise program.  Response to previous treatment: less discomfort.  Functional change: able to sleep comfortably    Pain: 6/10  Location: right neck to R shoulder     Objective   Casie received therapeutic exercises to develop strength, endurance and posture for 14 minutes including: UBE x 4 min., prone chin tucks x 2 min., DNF x 2 min., scap retraction x 2 min., lower trapezius x 2 min., middle trapezius x 2 min.        Casie received the following manual therapy techniques: Joint mobilizations were applied to the: for 9 minutes, including: R AA mob x 2 min., cervical ext mob C3-6 x 4 min., manual cervical traction x 3 min.     Casie received the following supervised modalities after being cleared for contradictions: TENS:  Casie received TENS electrical stimulation for pain to the cervical region. Pt received continuous mode x 15 minutes. Casie tolerated treatment well without any adverse effects.     Casie received hot pack for 15 minutes to cervical region.        Home Exercises Provided and Patient Education Provided     Education  provided:   - scapular strengthening therex    Written Home Exercises Provided: yes.  Exercises were reviewed and Casie was able to demonstrate them prior to the end of the session.  Casie demonstrated good  understanding of the education provided.     See EMR under Patient Instructions for exercises provided 9/28/2020.    Assessment   Pt. Has tenderness at R AA Mob and R C2-3 mob. Pt. Noted to have restriction at R C3-4 mob relieved opening mob.  Pt. Had less restrictions in cervical spine than on eval.  Pt. Had mild tension noted R cervical paraspinals.  Pt. Educated on scapular strengthening therex in standing secondary not tolerating prone scapular strengthening well.  Cont. Skilled PT to improve cervical mobility and ROM and increase B shoulder strength and decrease upper body pain.    Casie is progressing well towards her goals.   Pt prognosis is Good.     Pt will continue to benefit from skilled outpatient physical therapy to address the deficits listed in the problem list box on initial evaluation, provide pt/family education and to maximize pt's level of independence in the home and community environment.     Pt's spiritual, cultural and educational needs considered and pt agreeable to plan of care and goals.     Anticipated barriers to physical therapy: none    Goals:Short Term Goals: In 4 weeks   1.I with HEP  2.Patient to increase cervical ROM to 50% ROM in all planes of motion.   3.Patient to increase cervical MMT strength to 4/5 grossly.  4.Patient to have pain less than 5/10 at all times.  5. Pt. To have B shoulder ROM WNL.   5.Patient to score less than 25% on FOTO.     Long Term Goals: In 8 weeks  1. Patient to score less than 10% impaired on the FOTO.  2. Patient to demo increase in UE strength to 5/5 gross MMT to reach and carry overhead.  3. Patient to have decreased pain to less than 2/10 at all times to perform household chores.  4. Patient to demo increase cervical strength to maintain proper  joint alignment with sitting or standing activities.      Plan     Plan of care Certification: 9/24/2020 to 10/24/20.     Outpatient Physical Therapy 2 times weekly for 8 weeks to include the following interventions: Manual Therapy, Moist Heat/ Ice, Neuromuscular Re-ed, Patient Education, Self Care, Therapeutic Activites and Therapeutic Exercise, e-stim.     Lulu Swan, PT     Thank you for this referral.  Lulu Swan, PT

## 2020-10-02 ENCOUNTER — CLINICAL SUPPORT (OUTPATIENT)
Dept: REHABILITATION | Facility: HOSPITAL | Age: 65
End: 2020-10-02
Attending: PHYSICIAN ASSISTANT
Payer: MEDICARE

## 2020-10-02 DIAGNOSIS — M79.18 MYOFASCIAL PAIN ON RIGHT SIDE: ICD-10-CM

## 2020-10-02 DIAGNOSIS — M53.82 NECK MUSCLE WEAKNESS: ICD-10-CM

## 2020-10-02 DIAGNOSIS — M54.2 CERVICALGIA: Primary | ICD-10-CM

## 2020-10-02 PROCEDURE — 97014 ELECTRIC STIMULATION THERAPY: CPT

## 2020-10-02 PROCEDURE — 97110 THERAPEUTIC EXERCISES: CPT

## 2020-10-02 PROCEDURE — 97140 MANUAL THERAPY 1/> REGIONS: CPT

## 2020-10-02 NOTE — PROGRESS NOTES
Physical Therapy Treatment Note     Name: Casie Frias [facility]  Clinic Number: [id number]    Therapy Diagnosis:   Encounter Diagnoses   Name Primary?    Cervicalgia Yes    Myofascial pain on right side     Neck muscle weakness      Physician: Janki Oneil,*    Visit Date: 10/2/2020      Physician Orders: PT Eval and Treat   Medical Diagnosis from Referral:   M54.2 (ICD-10-CM) - Cervicalgia   M79.18 (ICD-10-CM) - Myofascial pain on right side         Evaluation Date: 9/24/2020  Authorization Period Expiration: 11/16/20  Plan of Care Expiration: 10/24/20  Visit # / Visits authorized: 3/ 12        Time In: 10:30  Time Out:   Total Billable Time:  minutes    Precautions: Anxiety, heart disease, history of cancer    Subjective     Pt reports:  That her pain began again about 30 minutes after last session and she has not been able to get relief doing her HEP.  She was compliant with home exercise program.  Response to previous treatment: less discomfort.  Functional change: able to sleep comfortably    Pain: 6/10, tightness R side cervical region, tingling R UE  Location: right neck to R shoulder     Objective   Casie received therapeutic exercises to develop strength, endurance and posture for 13 minutes including: Nu Step x 5 min., chin tucks x 2 min., DNF x 2 min., scap retraction 30#x 2 min., lower trapezius x 2 min.        Casie received the following manual therapy techniques: Joint mobilizations were applied to the: for 21 minutes, including: R AA mob x 2 min., cervical ext mob C1-6 x 5 min., manual cervical traction x 3 min., STM R lateral upper arm x 5 min., cupping x 6 min. To cervical paraspinals bilat.      Casie received the following supervised modalities after being cleared for contradictions: TENS:  Casie received TENS electrical stimulation for pain to the cervical region. Pt received continuous mode x 15 minutes. Casie tolerated treatment well without any adverse effects.     Casie received  hot pack for 15 minutes to cervical region.        Home Exercises Provided and Patient Education Provided     Education provided:   - scapular strengthening therex    Written Home Exercises Provided: yes.  Exercises were reviewed and Casie was able to demonstrate them prior to the end of the session.  Casie demonstrated good  understanding of the education provided.     See EMR under Patient Instructions for exercises provided 9/28/2020.    Assessment   Pt. Has hypomobility only at L C2-3 relieved with opening mob.  Pt. Also has decreased cervical mobility with extension C1-5.  Pt. Has tension in L cervical paraspinals and along L lateral upper arm.  Cupping began to decrease tension in cervical paraspinals.  Pt. Needed cues to perform therex properly.  Pt. May have had better results this week with less pain if exercises were performed properly.  Cont. Skilled PT to improve cervical mobility, decrease tension in paraspinals and along R upper arm, and increase cervical and shoulder/scapulars strength to maintain proper joint alignment for daily activities.    Casie is progressing well towards her goals.   Pt prognosis is Good.     Pt will continue to benefit from skilled outpatient physical therapy to address the deficits listed in the problem list box on initial evaluation, provide pt/family education and to maximize pt's level of independence in the home and community environment.     Pt's spiritual, cultural and educational needs considered and pt agreeable to plan of care and goals.     Anticipated barriers to physical therapy: none    Goals:Short Term Goals: In 4 weeks   1.I with HEP  2.Patient to increase cervical ROM to 50% ROM in all planes of motion.   3.Patient to increase cervical MMT strength to 4/5 grossly.  4.Patient to have pain less than 5/10 at all times.  5. Pt. To have B shoulder ROM WNL.   5.Patient to score less than 25% on FOTO.     Long Term Goals: In 8 weeks  1. Patient to score less than 10%  impaired on the FOTO.  2. Patient to demo increase in UE strength to 5/5 gross MMT to reach and carry overhead.  3. Patient to have decreased pain to less than 2/10 at all times to perform household chores.  4. Patient to demo increase cervical strength to maintain proper joint alignment with sitting or standing activities.      Plan     Plan of care Certification: 9/24/2020 to 10/24/20.     Outpatient Physical Therapy 2 times weekly for 8 weeks to include the following interventions: Manual Therapy, Moist Heat/ Ice, Neuromuscular Re-ed, Patient Education, Self Care, Therapeutic Activites and Therapeutic Exercise, e-stim.        Thank you for this referral.  Lulu Swan, PT

## 2020-10-05 ENCOUNTER — CLINICAL SUPPORT (OUTPATIENT)
Dept: REHABILITATION | Facility: HOSPITAL | Age: 65
End: 2020-10-05
Attending: PHYSICIAN ASSISTANT
Payer: MEDICARE

## 2020-10-05 DIAGNOSIS — M54.2 CERVICALGIA: Primary | ICD-10-CM

## 2020-10-05 DIAGNOSIS — M53.82 NECK MUSCLE WEAKNESS: ICD-10-CM

## 2020-10-05 DIAGNOSIS — M79.18 MYOFASCIAL PAIN ON RIGHT SIDE: ICD-10-CM

## 2020-10-05 PROCEDURE — 97014 ELECTRIC STIMULATION THERAPY: CPT

## 2020-10-05 PROCEDURE — 97110 THERAPEUTIC EXERCISES: CPT

## 2020-10-05 PROCEDURE — 97140 MANUAL THERAPY 1/> REGIONS: CPT

## 2020-10-05 NOTE — PROGRESS NOTES
Physical Therapy Treatment Note     Name: Casie Frias [facility]  Clinic Number: [id number]    Therapy Diagnosis:   Encounter Diagnoses   Name Primary?    Cervicalgia Yes    Myofascial pain on right side     Neck muscle weakness      Physician: Janki Oneil,*    Visit Date: 10/5/2020      Physician Orders: PT Eval and Treat   Medical Diagnosis from Referral:   M54.2 (ICD-10-CM) - Cervicalgia   M79.18 (ICD-10-CM) - Myofascial pain on right side         Evaluation Date: 9/24/2020  Authorization Period Expiration: 11/16/20  Plan of Care Expiration: 10/24/20  Visit # / Visits authorized: 4/ 12        Time In: 9:35  Time Out: 10:35  Total Billable Time:  minutes    Precautions: Anxiety, heart disease, history of cancer    Subjective     Pt reports: having relief in pain until Saturday.  Pt. Reports performing HEP with difficulty with some exercises.  Pt. Reports pain along spine down into lumbar region.     She was compliant with home exercise program.  Response to previous treatment: less discomfort.  Functional change: able to sleep comfortably    Pain: 6/10, tightness R side cervical region, tingling R UE  Location: right neck to R shoulder     Objective   Casie received therapeutic exercises to develop strength, endurance and posture for 20 minutes including: Nu Step x 8 min., chin tucks x 2 min., DNF x 2 min., scap retraction 30#x 2 min., ab bracing x 2 min., iso abs with Swiss ball x 2 min., seated lumbar flexion with ball x 2 min.        Casie received the following manual therapy techniques: Joint mobilizations were applied to the: for 18 minutes, including: R AA mob x 2 min., cervical ext mob C1-6 x 5 min., manual cervical traction x 3 min., STM R dorsal forearm x 5 min., R median nerve glide x 3 min.     Casie received the following supervised modalities after being cleared for contradictions: TENS:  Casie received TENS electrical stimulation for pain to the cervical region. Pt received continuous mode  x 15 minutes. Casie tolerated treatment well without any adverse effects.     Casie received hot pack for 15 minutes to cervical region.        Home Exercises Provided and Patient Education Provided     Education provided:   - scapular strengthening therex    Written Home Exercises Provided: yes.  Exercises were reviewed and Casie was able to demonstrate them prior to the end of the session.  Casie demonstrated good  understanding of the education provided.     See EMR under Patient Instructions for exercises provided 9/28/2020.    Assessment   Pt. Had discomfort with R AA mob and R C2-3 mobilizations.  Tension palpable in R suboccipitals.  Pt. Had R median nerve bias and tension palpable in R forearm.  Pt. Educated on core strengthening and spinal mobility exercises. Cont. Skilled PT to improve spinal mobility and cervical and core strength and improve R UE neural mobility to tolerate daily activities    Casie is progressing well towards her goals.   Pt prognosis is Good.     Pt will continue to benefit from skilled outpatient physical therapy to address the deficits listed in the problem list box on initial evaluation, provide pt/family education and to maximize pt's level of independence in the home and community environment.     Pt's spiritual, cultural and educational needs considered and pt agreeable to plan of care and goals.     Anticipated barriers to physical therapy: none    Goals:Short Term Goals: In 4 weeks   1.I with HEP  2.Patient to increase cervical ROM to 50% ROM in all planes of motion.   3.Patient to increase cervical MMT strength to 4/5 grossly.  4.Patient to have pain less than 5/10 at all times.  5. Pt. To have B shoulder ROM WNL.   5.Patient to score less than 25% on FOTO.     Long Term Goals: In 8 weeks  1. Patient to score less than 10% impaired on the FOTO.  2. Patient to demo increase in UE strength to 5/5 gross MMT to reach and carry overhead.  3. Patient to have decreased pain to less  than 2/10 at all times to perform household chores.  4. Patient to demo increase cervical strength to maintain proper joint alignment with sitting or standing activities.      Plan     Plan of care Certification: 9/24/2020 to 10/24/20.     Outpatient Physical Therapy 2 times weekly for 8 weeks to include the following interventions: Manual Therapy, Moist Heat/ Ice, Neuromuscular Re-ed, Patient Education, Self Care, Therapeutic Activites and Therapeutic Exercise, e-stim.        Thank you for this referral.  Lulu Swan, PT

## 2020-10-08 ENCOUNTER — CLINICAL SUPPORT (OUTPATIENT)
Dept: REHABILITATION | Facility: HOSPITAL | Age: 65
End: 2020-10-08
Attending: PHYSICIAN ASSISTANT
Payer: MEDICARE

## 2020-10-08 DIAGNOSIS — C56.9 MALIGNANT NEOPLASM OF OVARY, UNSPECIFIED LATERALITY: ICD-10-CM

## 2020-10-08 DIAGNOSIS — M54.2 CERVICALGIA: Primary | ICD-10-CM

## 2020-10-08 DIAGNOSIS — M79.18 MYOFASCIAL PAIN ON RIGHT SIDE: ICD-10-CM

## 2020-10-08 DIAGNOSIS — M53.82 NECK MUSCLE WEAKNESS: ICD-10-CM

## 2020-10-08 PROCEDURE — 97140 MANUAL THERAPY 1/> REGIONS: CPT

## 2020-10-08 PROCEDURE — 97110 THERAPEUTIC EXERCISES: CPT

## 2020-10-08 PROCEDURE — 97014 ELECTRIC STIMULATION THERAPY: CPT

## 2020-10-08 RX ORDER — AMLODIPINE BESYLATE 5 MG/1
10 TABLET ORAL DAILY
Qty: 60 TABLET | Refills: 11 | Status: CANCELLED | OUTPATIENT
Start: 2020-10-08 | End: 2021-10-08

## 2020-10-08 RX ORDER — AMLODIPINE BESYLATE 5 MG/1
10 TABLET ORAL DAILY
Qty: 60 TABLET | Refills: 11 | Status: SHIPPED | OUTPATIENT
Start: 2020-10-08 | End: 2020-11-17 | Stop reason: SDUPTHER

## 2020-10-08 NOTE — PROGRESS NOTES
Physical Therapy Treatment Note     Name: Casie Frias [facility]  Clinic Number: [id number]    Therapy Diagnosis:   Encounter Diagnoses   Name Primary?    Cervicalgia Yes    Myofascial pain on right side     Neck muscle weakness      Physician: Janki Oneil,*    Visit Date: 10/8/2020      Physician Orders: PT Eval and Treat   Medical Diagnosis from Referral:   M54.2 (ICD-10-CM) - Cervicalgia   M79.18 (ICD-10-CM) - Myofascial pain on right side         Evaluation Date: 9/24/2020  Authorization Period Expiration: 11/16/20  Plan of Care Expiration: 10/24/20  Visit # / Visits authorized: 4/ 12        Time In: 11:10  Time Out: 12:00  Total Billable Time:  minutes    Precautions: Anxiety, heart disease, history of cancer    Subjective     Pt reports: feeling better after last treatment.  Pt. Reports able to do exercises when she felt stiff and she loosened up.  Pt. Reports less numbness in R arm and doing exercises which helped decrease tingling.     She was compliant with home exercise program.  Response to previous treatment: less discomfort.  Functional change: able to sleep comfortably    Pain: 4/10, tightness R side cervical region, tingling R UE  Location: right neck to R shoulder     Objective   Casie received therapeutic exercises to develop strength, endurance and posture for 20 minutes including: Nu Step x 7 min., L ext. Rot x 2 min., L int. Rot x 2 min.      Casie received the following manual therapy techniques: Joint mobilizations were applied to the: for 16 minutes, including:  cervical ext mob C1-6 x 5 min., manual cervical traction x 3 min., R median nerve glide x 3 min., cupping to R cervical paraspinals and R UT x 5 min.     Casie received the following supervised modalities after being cleared for contradictions: TENS:  Casie received TENS electrical stimulation for pain to the cervical region. Pt received continuous mode x 15 minutes. Casie tolerated treatment well without any adverse effects.      Casie received hot pack for 15 minutes to cervical region.        Home Exercises Provided and Patient Education Provided     Education provided:   - scapular strengthening therex    Written Home Exercises Provided: yes.  Exercises were reviewed and Casie was able to demonstrate them prior to the end of the session.  Casie demonstrated good  understanding of the education provided.     See EMR under Patient Instructions for exercises provided 9/28/2020.    Assessment   Casie is progressing well towards her goals. Pt. Had less tension in R cervical paraspinals but still mild tension and tenderness in R suboccipitals.  Pt. Progressing with less discomfort in neck and less tingling in R UE.  Pt. Reports mild discomfort R shoulder and educated on R ext. And int. Rotator cuff strengthening.  Cont. Skilled PT to improve cervical strength and improve upper limb neural tension.    Pt prognosis is Good.     Pt will continue to benefit from skilled outpatient physical therapy to address the deficits listed in the problem list box on initial evaluation, provide pt/family education and to maximize pt's level of independence in the home and community environment.     Pt's spiritual, cultural and educational needs considered and pt agreeable to plan of care and goals.     Anticipated barriers to physical therapy: none    Goals:Short Term Goals: In 4 weeks   1.I with HEP  2.Patient to increase cervical ROM to 50% ROM in all planes of motion.   3.Patient to increase cervical MMT strength to 4/5 grossly.  4.Patient to have pain less than 5/10 at all times.  5. Pt. To have B shoulder ROM WNL.   5.Patient to score less than 25% on FOTO.     Long Term Goals: In 8 weeks  1. Patient to score less than 10% impaired on the FOTO.  2. Patient to demo increase in UE strength to 5/5 gross MMT to reach and carry overhead.  3. Patient to have decreased pain to less than 2/10 at all times to perform household chores.  4. Patient to demo  increase cervical strength to maintain proper joint alignment with sitting or standing activities.      Plan     Plan of care Certification: 9/24/2020 to 10/24/20.     Outpatient Physical Therapy 2 times weekly for 8 weeks to include the following interventions: Manual Therapy, Moist Heat/ Ice, Neuromuscular Re-ed, Patient Education, Self Care, Therapeutic Activites and Therapeutic Exercise, e-stim.        Thank you for this referral.  Lulu Swan, PT

## 2020-10-12 ENCOUNTER — CLINICAL SUPPORT (OUTPATIENT)
Dept: REHABILITATION | Facility: HOSPITAL | Age: 65
End: 2020-10-12
Attending: PHYSICIAN ASSISTANT
Payer: MEDICARE

## 2020-10-12 ENCOUNTER — LAB VISIT (OUTPATIENT)
Dept: LAB | Facility: HOSPITAL | Age: 65
End: 2020-10-12
Attending: INTERNAL MEDICINE
Payer: MEDICARE

## 2020-10-12 ENCOUNTER — OFFICE VISIT (OUTPATIENT)
Dept: NEPHROLOGY | Facility: CLINIC | Age: 65
End: 2020-10-12
Payer: MEDICARE

## 2020-10-12 VITALS
RESPIRATION RATE: 20 BRPM | HEART RATE: 78 BPM | WEIGHT: 257.5 LBS | SYSTOLIC BLOOD PRESSURE: 134 MMHG | DIASTOLIC BLOOD PRESSURE: 82 MMHG | HEIGHT: 67 IN | BODY MASS INDEX: 40.42 KG/M2

## 2020-10-12 DIAGNOSIS — C56.3 OVARIAN CANCER, BILATERAL: ICD-10-CM

## 2020-10-12 DIAGNOSIS — I10 HTN (HYPERTENSION), BENIGN: ICD-10-CM

## 2020-10-12 DIAGNOSIS — M53.82 NECK MUSCLE WEAKNESS: ICD-10-CM

## 2020-10-12 DIAGNOSIS — N18.2 CHRONIC KIDNEY DISEASE (CKD), STAGE II (MILD): ICD-10-CM

## 2020-10-12 DIAGNOSIS — N06.0 ISOLATED PROTEINURIA WITH MINOR GLOMERULAR ABNORMALITY: ICD-10-CM

## 2020-10-12 DIAGNOSIS — N18.2 CHRONIC KIDNEY DISEASE (CKD), STAGE II (MILD): Primary | ICD-10-CM

## 2020-10-12 DIAGNOSIS — M79.18 MYOFASCIAL PAIN ON RIGHT SIDE: ICD-10-CM

## 2020-10-12 DIAGNOSIS — M54.2 CERVICALGIA: Primary | ICD-10-CM

## 2020-10-12 LAB
BACTERIA #/AREA URNS HPF: NORMAL /HPF
BILIRUB UR QL STRIP: NEGATIVE
CLARITY UR: CLEAR
COLOR UR: YELLOW
GLUCOSE UR QL STRIP: NEGATIVE
HGB UR QL STRIP: ABNORMAL
HYALINE CASTS #/AREA URNS LPF: <1 /LPF
KETONES UR QL STRIP: NEGATIVE
LEUKOCYTE ESTERASE UR QL STRIP: NEGATIVE
MICROSCOPIC COMMENT: NORMAL
NITRITE UR QL STRIP: NEGATIVE
PH UR STRIP: 6 [PH] (ref 5–8)
PROT UR QL STRIP: ABNORMAL
RBC #/AREA URNS HPF: 2 /HPF (ref 0–4)
SP GR UR STRIP: 1.02 (ref 1–1.03)
SQUAMOUS #/AREA URNS HPF: NORMAL /HPF
URN SPEC COLLECT METH UR: ABNORMAL
WBC #/AREA URNS HPF: 1 /HPF (ref 0–5)

## 2020-10-12 PROCEDURE — 97014 ELECTRIC STIMULATION THERAPY: CPT

## 2020-10-12 PROCEDURE — 97110 THERAPEUTIC EXERCISES: CPT

## 2020-10-12 PROCEDURE — 99213 OFFICE O/P EST LOW 20 MIN: CPT | Mod: S$PBB,,, | Performed by: INTERNAL MEDICINE

## 2020-10-12 PROCEDURE — 99999 PR PBB SHADOW E&M-EST. PATIENT-LVL IV: ICD-10-PCS | Mod: PBBFAC,,, | Performed by: INTERNAL MEDICINE

## 2020-10-12 PROCEDURE — 99214 OFFICE O/P EST MOD 30 MIN: CPT | Mod: PBBFAC | Performed by: INTERNAL MEDICINE

## 2020-10-12 PROCEDURE — 99999 PR PBB SHADOW E&M-EST. PATIENT-LVL IV: CPT | Mod: PBBFAC,,, | Performed by: INTERNAL MEDICINE

## 2020-10-12 PROCEDURE — 81000 URINALYSIS NONAUTO W/SCOPE: CPT

## 2020-10-12 PROCEDURE — 84156 ASSAY OF PROTEIN URINE: CPT

## 2020-10-12 PROCEDURE — 97140 MANUAL THERAPY 1/> REGIONS: CPT

## 2020-10-12 PROCEDURE — 99213 PR OFFICE/OUTPT VISIT, EST, LEVL III, 20-29 MIN: ICD-10-PCS | Mod: S$PBB,,, | Performed by: INTERNAL MEDICINE

## 2020-10-12 RX ORDER — ASCORBIC ACID 1000 MG
40 TABLET ORAL
COMMUNITY

## 2020-10-12 NOTE — PROGRESS NOTES
Subjective:       Patient ID: Casie Gaines is a 64 y.o.    female who presents for follow-up evaluation of     Rossana Ang MD      HPI : Casie Gaines is a pleasant 64-year-old  woman with history of ovarian cancer, hypertension, seen in office today in f/u for above medical problems .  Clinic about 2 months ago, recent labs reviewed and discussed with the patient, stable renal function with a serum creatinine of 1 mg/dL, she has proteinuria of about 0.6 g per day ,        was diagnosed with right ovarian cancer and peritoneal carcinomatous in 2019, she is currently on treatment with Avastin, in 2019 she developed hydronephrosis on the right side, had a ureteric stent placed in February 2019, this was removed in October 2019, she used to see Dr. Johnson ,  recent imaging studies reviewed and discussed with the patient, no hydronephrosis noted       Past Medical History:   Diagnosis Date    Anemia     Anxiety     Arthritis     knees    Cancer     ovarian    CHF (congestive heart failure)     Hx antineoplastic chemotherapy     last 6/2019    Hyperlipemia     Hypertension     Neck pain     Ovarian cancer 2019    CHEMO       Current Outpatient Medications on File Prior to Visit   Medication Sig Dispense Refill    albuterol 90 mcg/actuation inhaler Inhale 2 puffs into the lungs every 4 (four) hours as needed for Wheezing. Rescue 18 g 0    ALPRAZolam (XANAX) 0.25 MG tablet Take 1 tablet (0.25 mg total) by mouth 3 (three) times daily as needed for Anxiety. 60 tablet 2    amLODIPine (NORVASC) 5 MG tablet Take 2 tablets (10 mg total) by mouth once daily. 60 tablet 11    biotin 1 mg tablet Take 1,000 mcg by mouth once daily.       cetirizine (ZYRTEC) 10 MG tablet Take 1 tablet (10 mg total) by mouth once daily. 30 tablet 5    diclofenac sodium (VOLTAREN) 1 % Gel Apply once a day to back as needed 100 g 1    docusate sodium (COLACE) 100 MG capsule Take 1 capsule  (100 mg total) by mouth 2 (two) times daily as needed for Constipation. 60 capsule 1    EPINEPHrine (EPIPEN) 0.3 mg/0.3 mL AtIn Inject 0.3 mLs (0.3 mg total) into the muscle once. for 1 dose 2 each 0    gabapentin (NEURONTIN) 100 MG capsule Take 1 capsule (100 mg total) by mouth 3 (three) times daily. 90 capsule 11    ginkgo biloba 40 mg Tab Take 40 mg by mouth.      hydrOXYzine HCL (ATARAX) 25 MG tablet Take 1 tablet (25 mg total) by mouth 3 (three) times daily as needed for Itching. 90 tablet 1    methocarbamoL (ROBAXIN) 500 MG Tab Take 1 tablet (500 mg total) by mouth 3 (three) times daily as needed (muscle spasms). 60 tablet 0    multivitamin with minerals tablet Take 1 tablet by mouth once daily.       olmesartan-hydrochlorothiazide (BENICAR HCT) 40-12.5 mg Tab Take 1 tablet by mouth once daily. 90 tablet 0    polyethylene glycol (GLYCOLAX) 17 gram/dose powder Take 17 g by mouth daily as needed (constipation). 510 g 0    rosuvastatin (CRESTOR) 10 MG tablet Take 1 tablet (10 mg total) by mouth once daily. 90 tablet 1    sertraline (ZOLOFT) 25 MG tablet Take 1 tablet (25 mg total) by mouth once daily. 30 tablet 11    zinc gluconate 50 mg tablet Take 50 mg by mouth once daily.       No current facility-administered medications on file prior to visit.      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   HENT: Negative for congestion, facial swelling, sore throat, trouble swallowing and voice change.    Eyes: Negative for redness and visual disturbance.   Respiratory: Negative for apnea, cough, chest tightness, shortness of breath and wheezing.    Cardiovascular: Negative for chest pain, palpitations and leg swelling.   Gastrointestinal: Negative  for abdominal distention, abdominal pain, blood in stool, constipation, diarrhea, nausea and vomiting.   Genitourinary: Negative for decreased urine volume, difficulty urinating, dysuria, flank pain, frequency, hematuria, pelvic pain and urgency.   Musculoskeletal: Positive for arthralgias. Negative for back pain, gait problem and joint swelling.   Skin: Negative for color change and rash.   Neurological: Negative for dizziness, syncope, weakness and headaches.   Hematological: Does not bruise/bleed easily.   Psychiatric/Behavioral: Negative for agitation, behavioral problems and confusion. The patient is not nervous/anxious.      :            Objective:         Vitals:    10/12/20 1416   BP: 134/82   Pulse: 78   Resp: 20       Weight 257 lb, stable      Physical Exam  Constitutional:       General: She is not in acute distress.     Appearance: She is well-developed.   HENT:      Head: Normocephalic and atraumatic.      Mouth/Throat:      Pharynx: No oropharyngeal exudate.   Eyes:      General: No scleral icterus.     Conjunctiva/sclera: Conjunctivae normal.      Pupils: Pupils are equal, round, and reactive to light.   Neck:      Musculoskeletal: Normal range of motion and neck supple.      Thyroid: No thyroid mass or thyromegaly.      Vascular: No carotid bruit or JVD.      Trachea: No tracheal deviation.   Cardiovascular:      Rate and Rhythm: Normal rate and regular rhythm.      Heart sounds: Normal heart sounds. No murmur. No friction rub. No gallop.    Pulmonary:      Effort: Pulmonary effort is normal. No respiratory distress.      Breath sounds: Normal breath sounds. No wheezing or rales.   Chest:      Chest wall: No tenderness.   Abdominal:      General: Bowel sounds are normal. There is no distension or abdominal bruit.      Palpations: Abdomen is soft. There is no mass.      Tenderness: There is no abdominal tenderness. There is no guarding or rebound.      Comments: Obese,    Musculoskeletal: Normal  range of motion.         General: No tenderness.   Lymphadenopathy:      Cervical: No cervical adenopathy.   Skin:     General: Skin is warm.      Coloration: Skin is not pale.      Findings: No erythema or rash.      Comments: Some redundant skin noted around ankles   Neurological:      Mental Status: She is alert and oriented to person, place, and time.      Cranial Nerves: No cranial nerve deficit.      Motor: No abnormal muscle tone.      Coordination: Coordination normal.      Deep Tendon Reflexes: Reflexes are normal and symmetric. Reflexes normal.   Psychiatric:         Behavior: Behavior normal.         Judgment: Judgment normal.       :            Labs:    Lab Results   Component Value Date    CREATININE 1.0 09/24/2020    BUN 12 09/24/2020     09/24/2020    K 4.0 09/24/2020     09/24/2020    CO2 28 09/24/2020       Lab Results   Component Value Date    WBC 8.68 09/24/2020    HGB 12.7 09/24/2020    HCT 41.2 09/24/2020    MCV 90 09/24/2020     09/24/2020       Lab Results   Component Value Date    CALCIUM 9.8 09/24/2020    PHOS 3.1 01/27/2019       Lab Results   Component Value Date    ALBUMIN 3.7 09/24/2020       Lab Results   Component Value Date    URICACID 5.2 11/19/2019     '  Impression and Plan :  64-year-old  woman with history of hypertension, right ovarian cancer, seen in office today in f/u  for following medical problems     1.  Proteinuria, most likely due to history of hypertension, discussed and educated patient about proteinuria, continue Benicar,     Avastin is known to cause proteinuria, no need to stop this medication at this time, will continue to monitor,     2.  Hypertension, discussed importance of blood pressure control, discussed low-salt diet,      3.  Stable renal function with a serum creatinine of 1 mg/dL,     4.  stable lytes    5.  Right ovarian cancer, continue follow-up with Oncology,    6.  Morbid obesity, discuss calorie restriction,  weight     Return to clinic in about 5 months  , more than 25 min of face-to-face time was spent with the patient  discussing labs and plan of care,      Sincerely,

## 2020-10-12 NOTE — PROGRESS NOTES
Physical Therapy Treatment Note     Name: Casie Frias [facility]  Clinic Number: [id number]    Therapy Diagnosis:   Encounter Diagnoses   Name Primary?    Cervicalgia Yes    Myofascial pain on right side     Neck muscle weakness      Physician: Janki Oneil,*    Visit Date: 10/12/2020      Physician Orders: PT Eval and Treat   Medical Diagnosis from Referral:   M54.2 (ICD-10-CM) - Cervicalgia   M79.18 (ICD-10-CM) - Myofascial pain on right side         Evaluation Date: 9/24/2020  Authorization Period Expiration: 11/16/20  Plan of Care Expiration: 10/24/20  Visit # / Visits authorized: 6/ 12        Time In: 1:15  Time Out: 2:00  Total Billable Time: 29 minutes    Precautions: Anxiety, heart disease, history of cancer    Subjective     Pt reports: that she is feeling better and did not have to exercise as much throughout day to keep her pain level down.  Pt. Reports most pain on R side of neck and little pain on L side of neck.     She was compliant with home exercise program.  Response to previous treatment: less discomfort.  Functional change: able to sleep comfortably    Pain: 3/10, nagging Location: right neck to R shoulder     Objective   Casie received therapeutic exercises to develop strength, endurance and posture for 16 minutes including: Nu Step x 6 min., standing UT x 2 min., standing middle trapezius x 2 min., B Ext. Rot x 2 min., B int. Rot x 2 min., scap retraction 30# x 2 min.      Casie received the following manual therapy techniques: Joint mobilizations were applied to the: for 13 minutes, including:   manual cervical traction x 3 min., R median nerve glide x 2 min., R AA mob x 2 min., R C2-3 opening mob x 2 min., neural mobilization to distal R median nerve x 4 min.     Casie received the following supervised modalities after being cleared for contradictions: TENS:  Casie received TENS electrical stimulation for pain to the cervical region. Pt received continuous mode x 15 minutes. Casie  tolerated treatment well without any adverse effects.     Casie received hot pack for 15 minutes to cervical region.        Home Exercises Provided and Patient Education Provided     Education provided:   - scapular strengthening therex    Written Home Exercises Provided: yes.  Exercises were reviewed and Casie was able to demonstrate them prior to the end of the session.  Casie demonstrated good  understanding of the education provided.     See EMR under Patient Instructions for exercises provided 9/28/2020.    Assessment   Casie is progressing well towards her goals. Pt prognosis is Good. Pt. Had no pain with R cervical ROM in all planes of motion.  Pt. Had mild tension R suboccipitals.  R had restriction with R AA and R C2-3 and R C4-5 levels with little resolve with mobilization.  Pt. Tolerated trapezius and rotator cuff strengthening well.  Cont. TENS and heat to relax cervical musculature.  Cont. Skilled PT to decrease cervical pain and radicular symptoms.     Pt will continue to benefit from skilled outpatient physical therapy to address the deficits listed in the problem list box on initial evaluation, provide pt/family education and to maximize pt's level of independence in the home and community environment.     Pt's spiritual, cultural and educational needs considered and pt agreeable to plan of care and goals.     Anticipated barriers to physical therapy: none    Goals:Short Term Goals: In 4 weeks   1.I with HEP  2.Patient to increase cervical ROM to 50% ROM in all planes of motion.   3.Patient to increase cervical MMT strength to 4/5 grossly.  4.Patient to have pain less than 5/10 at all times.  5. Pt. To have B shoulder ROM WNL.   5.Patient to score less than 25% on FOTO.     Long Term Goals: In 8 weeks  1. Patient to score less than 10% impaired on the FOTO.  2. Patient to demo increase in UE strength to 5/5 gross MMT to reach and carry overhead.  3. Patient to have decreased pain to less than 2/10 at  all times to perform household chores.  4. Patient to demo increase cervical strength to maintain proper joint alignment with sitting or standing activities.      Plan     Plan of care Certification: 9/24/2020 to 10/24/20.     Outpatient Physical Therapy 2 times weekly for 8 weeks to include the following interventions: Manual Therapy, Moist Heat/ Ice, Neuromuscular Re-ed, Patient Education, Self Care, Therapeutic Activites and Therapeutic Exercise, e-stim.        Thank you for this referral.  Lulu Swan, PT

## 2020-10-13 ENCOUNTER — TELEPHONE (OUTPATIENT)
Dept: PAIN MEDICINE | Facility: CLINIC | Age: 65
End: 2020-10-13

## 2020-10-13 LAB
CREAT UR-MCNC: 241 MG/DL (ref 15–325)
PROT UR-MCNC: 420 MG/DL (ref 0–15)
PROT/CREAT UR: 1.74 MG/G{CREAT} (ref 0–0.2)

## 2020-10-13 NOTE — PROGRESS NOTES
Worsening of proteinuria noted on recent labs, will discuss with Oncology if Avastin can  be replaced with a different medication,    Matt Benavidez MD

## 2020-10-13 NOTE — PROGRESS NOTES
Digital Medicine: Health  Introduction    Introduced Casie Michels to Digital Medicine. Discussed health  role and recommended lifestyle modifications.    The history is provided by the patient.                 Patient needs assistance troubleshooting: patient reminder needed and patient was locked out of [employer]. Patient is calling today to get reconnected to [employer] and will start taking readings again.    Additional Follow-up details: Introduced patient to program and myself as new HC. Patient and I reviewed program goals/requirements, proper BP testing protocol, and contact information. Will review physical activity levels, daily diet, etc at next encounter. Patient reports being locked out of her [employer] account and she needs to call and get this fixed. Patient plans to do so today. Patient is also on a chemo medication that does impact her BP. Patient reports taking Avastin as part of chemo treatment. Patient reports that this medication will increase BP. Will notify Gwendolyn Lopez. Will f/u in 4 weeks to check in.            Diet-Not assessed          Physical Activity-Not assessed    Medication Adherence-Medication Adherence not addressed.      Substance, Sleep, Stress-Not assessed      Additional monitoring needed.       Addressed patient questions and patient has my contact information if needed prior to next outreach. Patient verbalizes understanding.             There are no preventive care reminders to display for this patient.

## 2020-10-13 NOTE — TELEPHONE ENCOUNTER
Called pt from referral in que . Pt declined and will do PT first and call if she wants to do with us . Pt understood. All questions answered.   [facility]

## 2020-10-14 ENCOUNTER — PATIENT OUTREACH (OUTPATIENT)
Dept: OTHER | Facility: OTHER | Age: 65
End: 2020-10-14

## 2020-10-14 NOTE — PROGRESS NOTES
Digital Medicine: Clinician Introduction    Casie Gaines is a 64 y.o. female who is newly enrolled in the Digital Medicine Clinic.    Indicates has not had time yet to call tech support to receive assistance with [employer] activation.    The history is provided by the patient.      Review of patient's allergies indicates:  No Known Allergies  Completed Medication Reconciliation  Verified pharmacy information.    HYPERTENSION  Explained hypertension digital medicine goals including BP goal less than or equal to 130/80mmHg, improved convenience of BP management and reduced risk of heart attack, kidney failure, stroke, eye disease, dementia, and death.     Explained non-pharmacologic therapies like low salt diet and physical activity can reduce blood pressure.       Explained that we expect patient to submit several blood pressure readings per week at random times of the day, but at least 30 minutes after taking blood pressure medications. Instructed patient not to allow anyone else to use their blood pressure monitor and phone as data submitted is directly entered into medical record. Reviewed and confirmed appropriate blood pressure monitoring technique.         Reviewed signs/symptoms of hypertension (headache, changes in vision, chest pain, shortness of breath)   Reviewed signs/symptoms of hypotension (lightheaded, dizziness, weakness)             Last 5 Patient Entered Readings                                      Current 30 Day Average: 175/97     Recent Readings 9/28/2020 9/2/2020 9/2/2020 8/13/2020    SBP (mmHg) 175 145 142 165    DBP (mmHg) 97 70 77 93    Pulse 93 90 86 77                Depression Screening  Caise Gaines did not answer the depression screening.     Sleep Apnea Screening  Patient not previously diagnosed with JERALD       Medication Affordability Screening  Patient did not answer the medication affordability questionnaires. Patient is currently not having problems affording  medications    Medication Adherence-Medication adherence was assessed.  Patient continue taking medication as prescribed.            ASSESSMENT(S)  Patients BP average is 175/97 mmHg, which is above goal. Patient's BP goal is less than or equal to 130/80.     Hypertension Plan  Continue current therapy.       Addressed patient questions and patient has my contact information if needed prior to next outreach. Patient verbalizes understanding.      Explained the importance of self-monitoring and medication adherence. Encouraged the patient to communicate with their health  for lifestyle modifications to help improve or maintain a healthy lifestyle.        Sent link to [facility] webpages and my contact information via [employer] for future questions.        Explained to the patient that the Digital Medicine team is not available for emergencies. Advised patient call [facility] ([phone] or [phone]) or [phone] if needed.            There are no preventive care reminders to display for this patient.       Current Medication Regimen:  Hypertension Medications             amLODIPine (NORVASC) 5 MG tablet Take 2 tablets (10 mg total) by mouth once daily.    olmesartan-hydrochlorothiazide (BENICAR HCT) 40-12.5 mg Tab Take 1 tablet by mouth once daily.

## 2020-10-16 ENCOUNTER — CLINICAL SUPPORT (OUTPATIENT)
Dept: REHABILITATION | Facility: HOSPITAL | Age: 65
End: 2020-10-16
Attending: PHYSICIAN ASSISTANT
Payer: MEDICARE

## 2020-10-16 DIAGNOSIS — M79.18 MYOFASCIAL PAIN ON RIGHT SIDE: ICD-10-CM

## 2020-10-16 DIAGNOSIS — M53.82 NECK MUSCLE WEAKNESS: ICD-10-CM

## 2020-10-16 DIAGNOSIS — M54.2 CERVICALGIA: Primary | ICD-10-CM

## 2020-10-16 PROCEDURE — 97140 MANUAL THERAPY 1/> REGIONS: CPT

## 2020-10-16 PROCEDURE — 97110 THERAPEUTIC EXERCISES: CPT

## 2020-10-16 PROCEDURE — 97014 ELECTRIC STIMULATION THERAPY: CPT

## 2020-10-16 NOTE — PROGRESS NOTES
Physical Therapy Treatment Note     Name: Casie Frias [facility]  Clinic Number: [id number]    Therapy Diagnosis:   No diagnosis found.  Physician: Janki Oneil,*    Visit Date: 10/16/2020      Physician Orders: PT Eval and Treat   Medical Diagnosis from Referral:   M54.2 (ICD-10-CM) - Cervicalgia   M79.18 (ICD-10-CM) - Myofascial pain on right side         Evaluation Date: 9/24/2020  Authorization Period Expiration: 11/16/20  Plan of Care Expiration: 10/24/20  Visit # / Visits authorized: 7/ 12        Time In: 9:55  Time Out: 11:25  Total Billable Time: 47 minutes    Precautions: Anxiety, heart disease, history of cancer    Subjective     Pt reports: that she felt fine after last visit until Wednesday pain increased.  Pt. Reports more pain R side neck with pain radiating to Upper arm.  Pt. Reports pain comes and goes in R forearm and hand.     She was compliant with home exercise program.  Response to previous treatment: less discomfort.  Functional change: able to sleep comfortably    Pain: 7/10, tightness Location: right neck to R shoulder     Objective   Casie received therapeutic exercises to develop strength, endurance and posture for 19 minutes including: Nu Step x 5 min., standing UT x 2 min., standing middle trapezius x 2 min., B Ext. Rot x 2 min., B int. Rot x 2 min., scap retraction 30# x 2 min., standing lower trap x 2 min., L overhead carry 5# x 2 min.      Casie received the following manual therapy techniques: Joint mobilizations were applied to the: for 28 minutes, including:   R median nerve glide x 2 min., R AA mob x 2 min., R C2-3, R C4-5 opening mob x 4 min., Cupping to R cervical paraspinals and R UT x 10 min., STM R wrist extensors x 10 min.     Casie received the following supervised modalities after being cleared for contradictions: TENS:  Casie received TENS electrical stimulation for pain to the cervical region. Pt received continuous mode x 10 minutes. Casie tolerated treatment well  without any adverse effects.     Casie received hot pack for 10 minutes to cervical region.        Home Exercises Provided and Patient Education Provided     Education provided:   - scapular strengthening therex    Written Home Exercises Provided: yes.  Exercises were reviewed and Casie was able to demonstrate them prior to the end of the session.  Casie demonstrated good  understanding of the education provided.     See EMR under Patient Instructions for exercises provided 9/28/2020.    Assessment   Casie is progressing well towards her goals. Pt prognosis is Good.  Pt.has pain with R OA SB Had mild tension R suboccipitals.  R had restriction with R AA and R C2-3 and R C4-5 levels with little resolve with mobilization.  Pt. Had mild tension along R cervical parasinals and moderate tension R Upper trapezius- cupping continued.  Pt. Has moderate tension R wrist extensors- STM to help resolve tension and neural compression.  Pt. Tolerated trapezius and rotator cuff strengthening well.  Cont. TENS and heat to relax cervical musculature.  Cont. Skilled PT to decrease cervical pain and radicular symptoms.     Pt will continue to benefit from skilled outpatient physical therapy to address the deficits listed in the problem list box on initial evaluation, provide pt/family education and to maximize pt's level of independence in the home and community environment.     Pt's spiritual, cultural and educational needs considered and pt agreeable to plan of care and goals.     Anticipated barriers to physical therapy: none    Goals:Short Term Goals: In 4 weeks   1.I with HEP  2.Patient to increase cervical ROM to 50% ROM in all planes of motion.   3.Patient to increase cervical MMT strength to 4/5 grossly.  4.Patient to have pain less than 5/10 at all times.  5. Pt. To have B shoulder ROM WNL.   5.Patient to score less than 25% on FOTO.     Long Term Goals: In 8 weeks  1. Patient to score less than 10% impaired on the FOTO.  2.  Patient to demo increase in UE strength to 5/5 gross MMT to reach and carry overhead.  3. Patient to have decreased pain to less than 2/10 at all times to perform household chores.  4. Patient to demo increase cervical strength to maintain proper joint alignment with sitting or standing activities.      Plan     Plan of care Certification: 9/24/2020 to 10/24/20.     Outpatient Physical Therapy 2 times weekly for 8 weeks to include the following interventions: Manual Therapy, Moist Heat/ Ice, Neuromuscular Re-ed, Patient Education, Self Care, Therapeutic Activites and Therapeutic Exercise, e-stim.        Thank you for this referral.  Lulu Swan, PT

## 2020-10-19 ENCOUNTER — CLINICAL SUPPORT (OUTPATIENT)
Dept: REHABILITATION | Facility: HOSPITAL | Age: 65
End: 2020-10-19
Attending: PHYSICIAN ASSISTANT
Payer: MEDICARE

## 2020-10-19 ENCOUNTER — LAB VISIT (OUTPATIENT)
Dept: LAB | Facility: HOSPITAL | Age: 65
End: 2020-10-19
Attending: INTERNAL MEDICINE
Payer: MEDICARE

## 2020-10-19 ENCOUNTER — PATIENT MESSAGE (OUTPATIENT)
Dept: ADMINISTRATIVE | Facility: OTHER | Age: 65
End: 2020-10-19

## 2020-10-19 DIAGNOSIS — N18.2 CHRONIC KIDNEY DISEASE (CKD), STAGE II (MILD): ICD-10-CM

## 2020-10-19 DIAGNOSIS — M79.18 MYOFASCIAL PAIN ON RIGHT SIDE: ICD-10-CM

## 2020-10-19 DIAGNOSIS — N06.0 ISOLATED PROTEINURIA WITH MINOR GLOMERULAR ABNORMALITY: ICD-10-CM

## 2020-10-19 DIAGNOSIS — M54.2 CERVICALGIA: Primary | ICD-10-CM

## 2020-10-19 DIAGNOSIS — M53.82 NECK MUSCLE WEAKNESS: ICD-10-CM

## 2020-10-19 PROCEDURE — 97110 THERAPEUTIC EXERCISES: CPT

## 2020-10-19 PROCEDURE — 97014 ELECTRIC STIMULATION THERAPY: CPT

## 2020-10-19 PROCEDURE — 97140 MANUAL THERAPY 1/> REGIONS: CPT

## 2020-10-19 NOTE — PROGRESS NOTES
Physical Therapy Treatment Note     Name: Casie Frias [facility]  Clinic Number: [id number]    Therapy Diagnosis:   Encounter Diagnoses   Name Primary?    Cervicalgia Yes    Myofascial pain on right side     Neck muscle weakness      Physician: Janki Oneil,*    Visit Date: 10/19/2020      Physician Orders: PT Eval and Treat   Medical Diagnosis from Referral:   M54.2 (ICD-10-CM) - Cervicalgia   M79.18 (ICD-10-CM) - Myofascial pain on right side         Evaluation Date: 9/24/2020  Authorization Period Expiration: 11/16/20  Plan of Care Expiration: 10/24/20  Visit # / Visits authorized: 8/ 12        Time In: 4:10  Time Out: 5:00  Total Billable Time: 31  minutes    Precautions: Anxiety, heart disease, history of cancer    Subjective     Pt reports: doing well but having more pain in afternoons.   She was compliant with home exercise program.  Response to previous treatment: less discomfort.  Functional change: able to sleep comfortably    Pain: 6/10, tightness Location: right neck to R shoulder     Objective   Casie received therapeutic exercises to develop strength, endurance and posture for 19 minutes including: Nu Step x 6 min., DNF x 2 min., prone chin tucks x 2 min.,  standing UT x 2 min., standing middle trapezius x 2 min., B Ext. Rot x 2 min., B int. Rot x 2 min., scap retraction 30# x 2 min.     Casie received the following manual therapy techniques: Joint mobilizations were applied to the: for 12 minutes, including:   R median nerve glide x 2 min., R ulnar nerve glide x 2 min., R radial nerve glide x 2 min., R AA mob x 2 min., R C2-3, R C4-5 opening mob x 4 min.     Casie received the following supervised modalities after being cleared for contradictions: TENS:  Casie received TENS electrical stimulation for pain to the cervical region. Pt received continuous mode x 15 minutes. Casie tolerated treatment well without any adverse effects.     Casie received hot pack for 15 minutes to cervical  region.        Home Exercises Provided and Patient Education Provided     Education provided:   - scapular strengthening therex    Written Home Exercises Provided: yes.  Exercises were reviewed and Casie was able to demonstrate them prior to the end of the session.  Casie demonstrated good  understanding of the education provided.     See EMR under Patient Instructions for exercises provided 9/28/2020.    Assessment   Casie is progressing well towards her goals. Pt prognosis is Good.  Pt. Noted to continue have decreased cervical extension actively.  Pt. Had pain with OA nod and SB today.  Pt. Had mild Pain with L AA rotation.  Pt. Had discomfort with cervical extension mobilizations along cervical vertebrae.  Pt. Has mild tenderness along R cervical paraspinals but less tension palpable than last week.  Pt. Reminded of decreasing forward head posture with all activities.  Pt. Has pain and limited ROM with R shoulder abduction.  Pt.'s pain level gradually decreasing. Cont. Skilled PT to improve cervical, scapular, and rotator cuff strengthening, and desensitizing and improve neural extensibility to decrease pain.      Pt will continue to benefit from skilled outpatient physical therapy to address the deficits listed in the problem list box on initial evaluation, provide pt/family education and to maximize pt's level of independence in the home and community environment.     Pt's spiritual, cultural and educational needs considered and pt agreeable to plan of care and goals.     Anticipated barriers to physical therapy: none    Goals:  Short Term Goals: In 4 weeks   1.I with HEP  2.Patient to increase cervical ROM to 50% ROM in all planes of motion.   3.Patient to increase cervical MMT strength to 4/5 grossly.  4.Patient to have pain less than 5/10 at all times.  5. Pt. To have B shoulder ROM WNL.   5.Patient to score less than 25% on FOTO.     Long Term Goals: In 8 weeks  1. Patient to score less than 10% impaired  on the FOTO.  2. Patient to demo increase in UE strength to 5/5 gross MMT to reach and carry overhead.  3. Patient to have decreased pain to less than 2/10 at all times to perform household chores.  4. Patient to demo increase cervical strength to maintain proper joint alignment with sitting or standing activities.      Plan     Plan of care Certification: 9/24/2020 to 10/24/20.     Outpatient Physical Therapy 2 times weekly for 8 weeks to include the following interventions: Manual Therapy, Moist Heat/ Ice, Neuromuscular Re-ed, Patient Education, Self Care, Therapeutic Activites and Therapeutic Exercise, e-stim.        Thank you for this referral.  Lulu Swan, PT

## 2020-10-22 ENCOUNTER — CLINICAL SUPPORT (OUTPATIENT)
Dept: REHABILITATION | Facility: HOSPITAL | Age: 65
End: 2020-10-22
Attending: PHYSICIAN ASSISTANT
Payer: MEDICARE

## 2020-10-22 DIAGNOSIS — M79.18 MYOFASCIAL PAIN ON RIGHT SIDE: Primary | ICD-10-CM

## 2020-10-22 DIAGNOSIS — M53.82 NECK MUSCLE WEAKNESS: ICD-10-CM

## 2020-10-22 PROCEDURE — 97140 MANUAL THERAPY 1/> REGIONS: CPT

## 2020-10-22 NOTE — PROGRESS NOTES
Physical Therapy Treatment Note     Name: Casie Frias [facility]  Clinic Number: [id number]    Therapy Diagnosis:   Encounter Diagnoses   Name Primary?    Myofascial pain on right side Yes    Neck muscle weakness      Physician: Janki Oneil,*    Visit Date: 10/22/2020      Physician Orders: PT Eval and Treat   Medical Diagnosis from Referral:   M54.2 (ICD-10-CM) - Cervicalgia   M79.18 (ICD-10-CM) - Myofascial pain on right side         Evaluation Date: 9/24/2020  Authorization Period Expiration: 11/16/20  Plan of Care Expiration: 10/24/20  Visit # / Visits authorized: 8/ 12        Time In: 3:30  Time Out:   Total Billable Time:   minutes    Precautions: Anxiety, heart disease, history of cancer    Subjective     Pt reports: being late secondary in traffic.     She was compliant with home exercise program.  Response to previous treatment: less discomfort.  Functional change: able to sleep comfortably    Pain: 6/10, tightness Location: right neck to R shoulder     Objective   Casie received the following manual therapy techniques: Joint mobilizations were applied to the: for 15 minutes, including:  mechanical cervical traction x 12 min.  At 20 lb.s           Home Exercises Provided and Patient Education Provided     Education provided:   - scapular strengthening therex    Written Home Exercises Provided: yes.  Exercises were reviewed and Casie was able to demonstrate them prior to the end of the session.  Casie demonstrated good  understanding of the education provided.     See EMR under Patient Instructions for exercises provided 9/28/2020.    Assessment   Casie is progressing well towards her goals. Pt prognosis is Good.   Pt. Presents with home cervical traction unit and wants to be educated on its use.  Pt. Educated on how to be donned and doffed from mechanical traction and components for pumping, holding, and releasing.  Pt. Educated on length of time to use traction and parameters for traction.      Pt  will continue to benefit from skilled outpatient physical therapy to address the deficits listed in the problem list box on initial evaluation, provide pt/family education and to maximize pt's level of independence in the home and community environment.     Pt's spiritual, cultural and educational needs considered and pt agreeable to plan of care and goals.     Anticipated barriers to physical therapy: none    Goals:  Short Term Goals: In 4 weeks   1.I with HEP  2.Patient to increase cervical ROM to 50% ROM in all planes of motion.   3.Patient to increase cervical MMT strength to 4/5 grossly.  4.Patient to have pain less than 5/10 at all times.  5. Pt. To have B shoulder ROM WNL.   5.Patient to score less than 25% on FOTO.     Long Term Goals: In 8 weeks  1. Patient to score less than 10% impaired on the FOTO.  2. Patient to demo increase in UE strength to 5/5 gross MMT to reach and carry overhead.  3. Patient to have decreased pain to less than 2/10 at all times to perform household chores.  4. Patient to demo increase cervical strength to maintain proper joint alignment with sitting or standing activities.      Plan     Plan of care Certification: 9/24/2020 to 10/24/20.     Outpatient Physical Therapy 2 times weekly for 8 weeks to include the following interventions: Manual Therapy, Moist Heat/ Ice, Neuromuscular Re-ed, Patient Education, Self Care, Therapeutic Activites and Therapeutic Exercise, e-stim.        Thank you for this referral.  Lulu Swan, PT

## 2020-10-27 ENCOUNTER — PATIENT MESSAGE (OUTPATIENT)
Dept: ADMINISTRATIVE | Facility: OTHER | Age: 65
End: 2020-10-27

## 2020-10-27 ENCOUNTER — CLINICAL SUPPORT (OUTPATIENT)
Dept: REHABILITATION | Facility: HOSPITAL | Age: 65
End: 2020-10-27
Attending: PHYSICIAN ASSISTANT
Payer: MEDICARE

## 2020-10-27 DIAGNOSIS — M79.18 MYOFASCIAL PAIN ON RIGHT SIDE: ICD-10-CM

## 2020-10-27 DIAGNOSIS — M53.82 NECK MUSCLE WEAKNESS: Primary | ICD-10-CM

## 2020-10-27 DIAGNOSIS — M54.2 CERVICALGIA: ICD-10-CM

## 2020-10-27 PROCEDURE — 97164 PT RE-EVAL EST PLAN CARE: CPT | Mod: 59

## 2020-10-27 PROCEDURE — 97110 THERAPEUTIC EXERCISES: CPT

## 2020-10-27 NOTE — PROGRESS NOTES
Physical Therapy Treatment Note     Name: Casie Frias [facility]  Clinic Number: [id number]    Therapy Diagnosis:   No diagnosis found.  Physician: Janki Oneil,*    Visit Date: 10/27/2020      Physician Orders: PT Eval and Treat   Medical Diagnosis from Referral:   M54.2 (ICD-10-CM) - Cervicalgia   M79.18 (ICD-10-CM) - Myofascial pain on right side         Evaluation Date: 9/24/2020  Authorization Period Expiration: 11/16/20  Plan of Care Expiration: 10/24/20  Visit # / Visits authorized: 10/ 12        Time In: 2:30  Time Out: 3:15  Total Billable Time:   minutes    Precautions: Anxiety, heart disease, history of cancer    Subjective     Pt reports: See Eval    She was compliant with home exercise program.  Response to previous treatment: less discomfort.  Functional change: able to sleep comfortably    Pain: 6/10, tightness Location: right neck to R shoulder     Objective   RE-Eval completed    Casie received the following manual therapy techniques: cupping in R cervical paraspinals/ R UT x 8 min.    Casie received therapeutic exercise for 9 minutes, including:  Nu Step x 5 min., scap retraction x 2 min., shoulder shrugs x 2 min.         Home Exercises Provided and Patient Education Provided     Education provided:   - scapular strengthening therex    Written Home Exercises Provided: yes.  Exercises were reviewed and Casie was able to demonstrate them prior to the end of the session.  Casie demonstrated good  understanding of the education provided.     See EMR under Patient Instructions for exercises provided 9/28/2020.    Assessment   Casie is progressing well towards her goals. Pt prognosis is Good.   See Re-Eval    Pt will continue to benefit from skilled outpatient physical therapy to address the deficits listed in the problem list box on initial evaluation, provide pt/family education and to maximize pt's level of independence in the home and community environment.     Pt's spiritual, cultural and  educational needs considered and pt agreeable to plan of care and goals.     Anticipated barriers to physical therapy: none    Goals:  Short Term Goals: In 4 weeks   1.I with HEP  2.Patient to increase cervical ROM to 50% ROM in all planes of motion.   3.Patient to increase cervical MMT strength to 4/5 grossly.  4.Patient to have pain less than 5/10 at all times.  5. Pt. To have B shoulder ROM WNL.   5.Patient to score less than 25% on FOTO.     Long Term Goals: In 8 weeks  1. Patient to score less than 10% impaired on the FOTO.  2. Patient to demo increase in UE strength to 5/5 gross MMT to reach and carry overhead.  3. Patient to have decreased pain to less than 2/10 at all times to perform household chores.  4. Patient to demo increase cervical strength to maintain proper joint alignment with sitting or standing activities.      Plan     Plan of care Certification: 1027/2020 to 11/27/20.     Outpatient Physical Therapy 1-2 times weekly for 4 weeks to include the following interventions: Manual Therapy, Moist Heat/ Ice, Neuromuscular Re-ed, Patient Education, Self Care, Therapeutic Activites and Therapeutic Exercise, e-stim.        Thank you for this referral.  Lulu Swan, PT

## 2020-10-27 NOTE — PLAN OF CARE
Outpatient Therapy Updated Plan of Care     Visit Date: 10/27/2020    Name: Casie Gaines  Clinic Number: [id number]    Therapy Diagnosis:   Encounter Diagnoses   Name Primary?    Neck muscle weakness Yes    Myofascial pain on right side     Cervicalgia      Physician: Janki Oneil,*    Physician Orders: PT Eval and Treat   Medical Diagnosis from Referral:   M54.2 (ICD-10-CM) - Cervicalgia   M79.18 (ICD-10-CM) - Myofascial pain on right side       Evaluation Date: 10/27/2020  Current Certification Period:  9/24/20 to 10/24/20  Authorization Period Expiration: 11/16/20  Plan of Care Expiration: 10/24/20  Visit # / Visits authorized: 10/ 12    Precautions: Anxiety, heart disease, history of cancer  Functional Level Prior to Evaluation:  reaching overhead, carrying moderate size objects, mopping, carrying for baby    Subjective     Update: Pt. Reports that her neck feels 'superb'.  Pt. Reports only discomfort in her R lateral wrist.    Pain: 1/10 R side cervical region  R wrist: 3/10    Objective     Update:   CMS Impairment/Limitation/Restriction for FOTO Neck Survey     Therapist reviewed FOTO scores for Casie Gaines on 9/24/2020.   FOTO documents entered into [employer] - see Media section.     Limitation Score: 42%         Posture: Pt noted to present with forward head/rounded shoulder posture.     C Spine AROM: Eval    % Pain   FB 30 pain   BB 20  pain   RSB 5 pain   LSB 20 pain   RR 40 pain   LR 60 pain      C Spine AROM: Re-Eval    % Pain   FB 45    BB 35    RSB 35    LSB 40    RR 65    LR 75        Strength:  Muscle (Myotome) Right Left   Shoulder Flex 3+/5 3+/5   Shoulder Abduction 1+/5 1+/5                                           Middle trapezius                      3/5                   3/5   Lower Trapezius                     3/5                   3/5  Rhomboids                              1+/5                1+/5    Strength: RE-Eval  Muscle (Myotome) Right Left   Shoulder Flex 4/5  4/5   Shoulder Abduction 4/5 4/5    Middle Trap  4/5  3+/5    Lower Trap  4/5  3+/5    Rhomboids  4/5  3+/5    Ext. Rot  3+/5 3+/5    Int. rot 3+/5 3+/5            UE ROM:  Eval    RE-Eval   Shld Flex R:L 130:135 160:150  B shld abd R:L 130:110 145:145  B Ext. Rot WNL  B Int. Rot WNL  B hor. Add 0 deg.  R:L 20:0      Sensation:  B UE Intact           Special Test: Distraction:                  Neg.     Neck Flexor Endurance Test:  3sec                          Spurling Test:              B neg.  Vertebral Artery Test (if needed): NT                          Sharp Esteban:              NT       Transverse Lig Test:   NT     RE-Eval   Neck Flexor Endurance Test: 25 sec. With wavering    Joint Mobility: hypomobility R C1-2,      1st Rib Mobility: NT     Upper Limb Tension Test: NT          Assessment     Update: Pt. Progressing well with increased AROM in cervical region and B shoulders.  Pt.'s cervical and R shoulder/scapular strength increasing.  Pt.'s pain level decreasing well.  Pt. Still has moderate weakness L scapular region.  Pt. Has tension in R cervical paraspinals.    Previous Short Term Goals Status:   Met 4/5  New Short Term Goals Status:   NA  Long Term Goal Status:   continue per initial plan of care.  Short Term Goals: In 4 weeks   1.I with HEP Met  2.Patient to increase cervical ROM to 50% ROM in all planes of motion. Met  3.Patient to increase cervical MMT strength to 4/5 grossly. Not Tested  4.Patient to have pain less than 5/10 at all times. Met  5. Pt. To have B shoulder ROM WNL. Met  5.Patient to score less than 25% on FOTO. Progressing     Long Term Goals: In 8 weeks  1. Patient to score less than 10% impaired on the FOTO.   2. Patient to demo increase in UE strength to 5/5 gross MMT to reach and carry overhead.  3. Patient to have decreased pain to less than 2/10 at all times to perform household chores.  4. Patient to demo increase cervical strength to maintain proper joint alignment with  sitting or standing activities.    Reasons for Recertification of Therapy:   Pt. Cont. To need skilled PT to reach maximum cervical ROM and to have good cervical strength.  Pt. Cont. To need skilled PT to reduce tension in R cervical paraspinals region.  Cont. Skilled PT to decrease pain in cervical and R UE/scapular region to perform daily activities without discomfort.    Plan     Updated Certification Period: 10/27/2020 to 11/27/20  Recommended Treatment Plan: 1-2 times per week for 4 weeks: Manual Therapy, Moist Heat/ Ice, Neuromuscular Re-ed, Patient Education, Self Care, Therapeutic Activites and Therapeutic Exercise, e-stim.  Other Recommendations: None     Lulu Swan, PT  10/27/2020      I CERTIFY THE NEED FOR THESE SERVICES FURNISHED UNDER THIS PLAN OF TREATMENT AND WHILE UNDER MY CARE    Physician's comments:        Physician's Signature: ___________________________________________________

## 2020-10-28 NOTE — PROGRESS NOTES
Subjective:       Patient ID: Casie Gaines is a 64 y.o. female.    Chief Complaint: Peritoneal carcinomatosis [C78.6, C80.1]  HPI: We have an opportunity to see Ms. Casie aGines in Hematology Oncology clinic at [facility] on 10/28/2020.  Ms. Casie Gaines is a 64 y.o. woman with peritoneal carcinomatosis with malignant right pleural effusion.  Final pathology is consistent with ovarian primary with  2740.  PET scan demonstrates multiple omental and peritoneal avid masses consistent with peritoneal carcinomatosis, extensive retroperitoneal and mediastinal lymphadenopathy, masslike structure in the right iliac fossa likely representing ovarian mass.  Patient has been evaluated by GYN Oncology Dr. Juarez and MD Green with recommendations for him carboplatin/Taxol/Avastin.      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  S/p 6 cycle avastin taxol carboplatin.  Has great CA after 6 cycles. On 8/15/2019 underwent salpingoophorectomy. Pathology showed CR. Currently on maintenance avastin.       Oncology History   Peritoneal carcinomatosis   1/26/2019 Initial Diagnosis    Peritoneal carcinomatosis     2/15/2019 - 7/8/2019 Chemotherapy    Treatment Summary   Plan Name: OP GYN PACLITAXEL CARBOPLATIN (AUC 6) Q3W  Treatment Goal: Palliative  Status: Inactive  Start Date: 2/28/2019  End Date: 6/18/2019  Provider: Will Trevizo Jr., MD  Chemotherapy: bevacizumab (AVASTIN) 15 mg/kg = 1,600 mg in sodium chloride 0.9% 100 mL chemo infusion, 15 mg/kg = 1,600 mg (100 % of original dose 15 mg/kg), Intravenous, Clinic/HOD 1 time, 6 of 6 cycles  Dose modification: 15 mg/kg (original dose 15 mg/kg, Cycle 1)  Administration: 1,600 mg (2/28/2019), 1,600 mg (3/21/2019), 1,600 mg (4/11/2019), 1,600 mg (5/7/2019), 1,600 mg (5/28/2019), 1,510 mg (6/18/2019)  CARBOplatin (PARAPLATIN) 870 mg in sodium chloride 0.9% 337 mL chemo infusion, 870 mg (100 % of original dose 868.8 mg),  Intravenous, Clinic/HOD 1 time, 6 of 6 cycles  Dose modification:   (original dose 868.8 mg, Cycle 1)  Administration: 870 mg (2/28/2019), 870 mg (3/21/2019), 900 mg (4/11/2019), 870 mg (5/7/2019), 660 mg (5/28/2019), 690 mg (6/18/2019)  PACLitaxel (TAXOL) 175 mg/m2 = 396 mg in sodium chloride 0.9% 566 mL chemo infusion, 175 mg/m2 = 396 mg, Intravenous, Clinic/HOD 1 time, 6 of 6 cycles  Dose modification: 140 mg/m2 (80 % of original dose 175 mg/m2, Cycle 5)  Administration: 396 mg (2/28/2019), 396 mg (3/21/2019), 396 mg (4/11/2019), 396 mg (5/7/2019), 318 mg (5/28/2019), 306 mg (6/18/2019)     10/9/2019 -  Chemotherapy    Treatment Summary   Plan Name: OP BEVACIZUMAB Q3W   Treatment Goal: Maintenance  Status: Active  Start Date: 10/9/2019  End Date: 1/28/2021 (Planned)  Provider: Oscar Mckeon MD  Chemotherapy: bevacizumab (AVASTIN) 15 mg/kg = 1,615 mg in sodium chloride 0.9% 100 mL chemo infusion, 15 mg/kg = 1,615 mg, Intravenous, Clinic/HOD 1 time, 11 of 17 cycles  Administration: 1,615 mg (10/9/2019), 1,615 mg (10/29/2019), 1,615 mg (12/10/2019), 1,615 mg (1/21/2020), 1,600 mg (4/2/2020), 1,615 mg (4/23/2020), 1,600 mg (7/1/2020), 1,600 mg (7/22/2020), 1,600 mg (8/13/2020), 1,795 mg (9/3/2020), 1,795 mg (9/24/2020)     Malignant neoplasm of right ovary   2/15/2019 Initial Diagnosis    Malignant neoplasm of right ovary     2/15/2019 - 7/8/2019 Chemotherapy    Treatment Summary   Plan Name: OP GYN PACLITAXEL CARBOPLATIN (AUC 6) Q3W  Treatment Goal: Palliative  Status: Inactive  Start Date: 2/28/2019  End Date: 6/18/2019  Provider: Will Trevizo Jr., MD  Chemotherapy: bevacizumab (AVASTIN) 15 mg/kg = 1,600 mg in sodium chloride 0.9% 100 mL chemo infusion, 15 mg/kg = 1,600 mg (100 % of original dose 15 mg/kg), Intravenous, Clinic/HOD 1 time, 6 of 6 cycles  Dose modification: 15 mg/kg (original dose 15 mg/kg, Cycle 1)  Administration: 1,600 mg (2/28/2019), 1,600 mg (3/21/2019), 1,600 mg (4/11/2019), 1,600 mg  (5/7/2019), 1,600 mg (5/28/2019), 1,510 mg (6/18/2019)  CARBOplatin (PARAPLATIN) 870 mg in sodium chloride 0.9% 337 mL chemo infusion, 870 mg (100 % of original dose 868.8 mg), Intravenous, Clinic/HOD 1 time, 6 of 6 cycles  Dose modification:   (original dose 868.8 mg, Cycle 1)  Administration: 870 mg (2/28/2019), 870 mg (3/21/2019), 900 mg (4/11/2019), 870 mg (5/7/2019), 660 mg (5/28/2019), 690 mg (6/18/2019)  PACLitaxel (TAXOL) 175 mg/m2 = 396 mg in sodium chloride 0.9% 566 mL chemo infusion, 175 mg/m2 = 396 mg, Intravenous, Clinic/HOD 1 time, 6 of 6 cycles  Dose modification: 140 mg/m2 (80 % of original dose 175 mg/m2, Cycle 5)  Administration: 396 mg (2/28/2019), 396 mg (3/21/2019), 396 mg (4/11/2019), 396 mg (5/7/2019), 318 mg (5/28/2019), 306 mg (6/18/2019)     10/9/2019 -  Chemotherapy    Treatment Summary   Plan Name: OP BEVACIZUMAB Q3W   Treatment Goal: Maintenance  Status: Active  Start Date: 10/9/2019  End Date: 12/21/2020 (Planned)  Provider: Oscar Mckeon MD  Chemotherapy: bevacizumab (AVASTIN) 15 mg/kg = 1,615 mg in sodium chloride 0.9% 100 mL chemo infusion, 15 mg/kg = 1,615 mg, Intravenous, Clinic/HOD 1 time, 6 of 17 cycles  Administration: 1,615 mg (10/9/2019), 1,615 mg (10/29/2019), 1,615 mg (12/10/2019), 1,615 mg (1/21/2020), 1,600 mg (4/2/2020), 1,615 mg (4/23/2020)     Malignant neoplasm of both ovaries   2/18/2019 Initial Diagnosis    Ovarian cancer     10/9/2019 -  Chemotherapy    Treatment Summary   Plan Name: OP BEVACIZUMAB Q3W   Treatment Goal: Maintenance  Status: Active  Start Date: 10/9/2019  End Date: 12/21/2020 (Planned)  Provider: Oscar Mckeon MD  Chemotherapy: bevacizumab (AVASTIN) 15 mg/kg = 1,615 mg in sodium chloride 0.9% 100 mL chemo infusion, 15 mg/kg = 1,615 mg, Intravenous, Clinic[facility] 1 time, 6 of 17 cycles  Administration: 1,615 mg (10/9/2019), 1,615 mg (10/29/2019), 1,615 mg (12/10/2019), 1,615 mg (1/21/2020), 1,600 mg (4/2/2020), 1,615 mg (4/23/2020)     Ovarian cancer,  bilateral   8/15/2019 Initial Diagnosis    Ovarian cancer, bilateral     10/9/2019 -  Chemotherapy    Treatment Summary   Plan Name: OP BEVACIZUMAB Q3W   Treatment Goal: Maintenance  Status: Active  Start Date: 10/9/2019  End Date: 2020 (Planned)  Provider: Oscar Mckeon MD  Chemotherapy: bevacizumab (AVASTIN) 15 mg/kg = 1,615 mg in sodium chloride 0.9% 100 mL chemo infusion, 15 mg/kg = 1,615 mg, Intravenous, Clinic/HOD 1 time, 6 of 17 cycles  Administration: 1,615 mg (10/9/2019), 1,615 mg (10/29/2019), 1,615 mg (12/10/2019), 1,615 mg (2020), 1,600 mg (2020), 1,615 mg (2020)       Past Medical History:   Diagnosis Date    Anemia     Anxiety     Arthritis     knees    Cancer     ovarian    CHF (congestive heart failure)     Hx antineoplastic chemotherapy     last 2019    Hyperlipemia     Hypertension     Neck pain     Ovarian cancer 2019    CHEMO    Peritoneal carcinomatosis 2019     Family History   Problem Relation Age of Onset    Anesthesia problems Other     Breast cancer Maternal Aunt     Breast cancer Paternal Aunt     Ovarian cancer Paternal Aunt     Colon cancer Brother     Thrombophilia Neg Hx      Social History     Socioeconomic History    Marital status:      Spouse name: Not on file    Number of children: Not on file    Years of education: Not on file    Highest education level: Not on file   Occupational History    Not on file   Social Needs    Financial resource strain: Not on file    Food insecurity     Worry: Not on file     Inability: Not on file    Transportation needs     Medical: Not on file     Non-medical: Not on file   Tobacco Use    Smoking status: Former Smoker     Packs/day: 1.00     Years: 25.00     Pack years: 25.00     Quit date: 10/1/2018     Years since quittin.0    Smokeless tobacco: Never Used    Tobacco comment: States started quit 2 months ago after 30 years   Substance and Sexual Activity    Alcohol use: Never      Alcohol/week: 0.0 standard drinks     Frequency: Never     Comment: occasionally  No alcohol 72h prior to sx    Drug use: No    Sexual activity: Not Currently     Partners: Male     Comment: hyst; mut monog   Lifestyle    Physical activity     Days per week: Not on file     Minutes per session: Not on file    Stress: Only a little   Relationships    Social connections     Talks on phone: Not on file     Gets together: Not on file     Attends Spiritism service: Not on file     Active member of club or organization: Not on file     Attends meetings of clubs or organizations: Not on file     Relationship status: Not on file   Other Topics Concern    Not on file   Social History Narrative    Live w/ spouse and 2 dogs      Past Surgical History:   Procedure Laterality Date    breast reduction  10/02/2018     SECTION      X 1    CYSTOSCOPY W/ URETERAL STENT PLACEMENT Right 2019    Procedure: CYSTOSCOPY, WITH URETERAL STENT INSERTION;  Surgeon: Timmy Santiago IV, MD;  Location: [facility];  Service: Urology;  Laterality: Right;    CYSTOSCOPY W/ URETERAL STENT REMOVAL  10/04/2019    DILATION AND CURETTAGE OF UTERUS      HYSTERECTOMY      RALH for fibroids (still has ovaries)    INSERTION OF VENOUS ACCESS PORT Left 2019    Procedure: INSERTION, VENOUS ACCESS PORT;  Surgeon: Ulisses Monzon MD;  Location: [facility] OR;  Service: General;  Laterality: Left;  Left internal jugular     LYSIS OF ADHESIONS OF URETER N/A 8/15/2019    Procedure: URETEROLYSIS;  Surgeon: Ismael Juarez MD;  Location: [facility] OR;  Service: OB/GYN;  Laterality: N/A;    OMENTECTOMY N/A 8/15/2019    Procedure: OMENTECTOMY;  Surgeon: Ismael Juarez MD;  Location: [facility] OR;  Service: OB/GYN;  Laterality: N/A;    RETROGRADE PYELOGRAPHY Right 2019    Procedure: PYELOGRAM, RETROGRADE;  Surgeon: Timmy Santiago IV, MD;  Location: [facility] OR;  Service: Urology;  Laterality: Right;    ROBOT-ASSISTED LAPAROSCOPIC SALPINGO-OOPHORECTOMY  USING DA TIA XI Bilateral 8/15/2019    Procedure: XI ROBOTIC SALPINGO-OOPHORECTOMY;  Surgeon: Ismael Juarez MD;  Location: [facility];  Service: OB/GYN;  Laterality: Bilateral;    TOTAL REDUCTION MAMMOPLASTY  2018    TUBAL LIGATION       Current Outpatient Medications   Medication Sig Dispense Refill    albuterol 90 mcg/actuation inhaler Inhale 2 puffs into the lungs every 4 (four) hours as needed for Wheezing. Rescue 18 g 0    ALPRAZolam (XANAX) 0.25 MG tablet Take 1 tablet (0.25 mg total) by mouth 3 (three) times daily as needed for Anxiety. 60 tablet 2    amLODIPine (NORVASC) 5 MG tablet Take 2 tablets (10 mg total) by mouth once daily. 60 tablet 11    biotin 1 mg tablet Take 1,000 mcg by mouth once daily.       cetirizine (ZYRTEC) 10 MG tablet Take 1 tablet (10 mg total) by mouth once daily. 30 tablet 5    diclofenac sodium (VOLTAREN) 1 % Gel Apply once a day to back as needed 100 g 1    docusate sodium (COLACE) 100 MG capsule Take 1 capsule (100 mg total) by mouth 2 (two) times daily as needed for Constipation. 60 capsule 1    EPINEPHrine (EPIPEN) 0.3 mg/0.3 mL AtIn Inject 0.3 mLs (0.3 mg total) into the muscle once. for 1 dose 2 each 0    gabapentin (NEURONTIN) 100 MG capsule Take 1 capsule (100 mg total) by mouth 3 (three) times daily. 90 capsule 11    ginkgo biloba 40 mg Tab Take 40 mg by mouth.      hydrOXYzine HCL (ATARAX) 25 MG tablet Take 1 tablet (25 mg total) by mouth 3 (three) times daily as needed for Itching. 90 tablet 1    methocarbamoL (ROBAXIN) 500 MG Tab Take 1 tablet (500 mg total) by mouth 3 (three) times daily as needed (muscle spasms). 60 tablet 0    multivitamin with minerals tablet Take 1 tablet by mouth once daily.       olmesartan-hydrochlorothiazide (BENICAR HCT) 40-12.5 mg Tab Take 1 tablet by mouth once daily. 90 tablet 0    polyethylene glycol (GLYCOLAX) 17 gram/dose powder Take 17 g by mouth daily as needed (constipation). 510 g 0    rosuvastatin (CRESTOR) 10 MG  tablet Take 1 tablet (10 mg total) by mouth once daily. 90 tablet 1    sertraline (ZOLOFT) 25 MG tablet Take 1 tablet (25 mg total) by mouth once daily. 30 tablet 11    zinc gluconate 50 mg tablet Take 50 mg by mouth once daily.       No current facility-administered medications for this visit.        Labs:  Lab Results   Component Value Date    WBC 8.68 09/24/2020    HGB 12.7 09/24/2020    HCT 41.2 09/24/2020    MCV 90 09/24/2020     09/24/2020     BMP  Lab Results   Component Value Date     09/24/2020    K 4.0 09/24/2020     09/24/2020    CO2 28 09/24/2020    BUN 12 09/24/2020    CREATININE 1.0 09/24/2020    CALCIUM 9.8 09/24/2020    ANIONGAP 10 09/24/2020    ESTGFRAFRICA >60 09/24/2020    EGFRNONAA 59 (A) 09/24/2020     Lab Results   Component Value Date    ALT 18 09/24/2020    AST 24 09/24/2020    ALKPHOS 100 09/24/2020    BILITOT 0.7 09/24/2020       No results found for: IRON, TIBC, FERRITIN, SATURATEDIRO  No results found for: [id number]  No results found for: FOLATE  Lab Results   Component Value Date    TSH 1.869 04/19/2013       I have reviewed the radiology reports and examined the scan/xray images.    Review of Systems   Constitutional: Negative.    HENT: Negative.    Eyes: Negative.    Respiratory: Negative.    Cardiovascular: Negative.    Gastrointestinal: Negative.    Endocrine: Negative.    Genitourinary: Negative.    Musculoskeletal: Negative.    Skin: Negative.    Allergic/Immunologic: Negative.    Neurological: Negative.    Hematological: Negative.    Psychiatric/Behavioral: Negative.      ECOG SCORE    0 - Fully active-able to carry on all pre-disease performance without restriction            Objective:     Vitals:    10/29/20 1317   BP: (!) 145/74   Pulse: 94   Temp: 98.7 °F (37.1 °C)   Body mass index is 41.3 kg/m².  Physical Exam  Vitals signs and nursing note reviewed.   Constitutional:       Appearance: She is well-developed.   HENT:      Head: Normocephalic and  atraumatic.   Eyes:      Conjunctiva/sclera: Conjunctivae normal.   Neck:      Musculoskeletal: Normal range of motion and neck supple.   Cardiovascular:      Rate and Rhythm: Normal rate and regular rhythm.   Pulmonary:      Effort: Pulmonary effort is normal.      Breath sounds: Normal breath sounds.   Abdominal:      General: Bowel sounds are normal.      Palpations: Abdomen is soft.   Musculoskeletal: Normal range of motion.   Skin:     General: Skin is warm and dry.   Neurological:      Mental Status: She is alert and oriented to person, place, and time.   Psychiatric:         Behavior: Behavior normal.         Thought Content: Thought content normal.         Judgment: Judgment normal.           Assessment:      1. Peritoneal carcinomatosis    2. Malignant neoplasm of right ovary    3. Congestion of nasal sinus           Plan:     Peritoneal carcinomatosis  Continue on maintenance avastin if proteinuria is 2+ or less.  -     CBC Auto Differential; Future; Expected date: 11/19/2020  -     Comprehensive Metabolic Panel; Future; Expected date: 11/19/2020  -     Urinalysis; Future; Expected date: 11/19/2020  -     dexAMETHasone (DECADRON) 4 MG Tab; Take 1 tablet (4 mg total) by mouth every 12 (twelve) hours.  Dispense: 60 tablet; Refill: 0    Malignant neoplasm of right ovary    Congestion of nasal sinus  -     fluticasone propionate (FLONASE) 50 mcg/actuation nasal spray; 1 spray (50 mcg total) by Each Nostril route every 12 (twelve) hours as needed for Rhinitis.  Dispense: 16 g; Refill: 0

## 2020-10-29 ENCOUNTER — OFFICE VISIT (OUTPATIENT)
Dept: HEMATOLOGY/ONCOLOGY | Facility: CLINIC | Age: 65
End: 2020-10-29
Payer: MEDICARE

## 2020-10-29 ENCOUNTER — INFUSION (OUTPATIENT)
Dept: INFUSION THERAPY | Facility: HOSPITAL | Age: 65
End: 2020-10-29
Attending: INTERNAL MEDICINE
Payer: MEDICARE

## 2020-10-29 VITALS
OXYGEN SATURATION: 97 % | SYSTOLIC BLOOD PRESSURE: 145 MMHG | HEART RATE: 94 BPM | DIASTOLIC BLOOD PRESSURE: 74 MMHG | TEMPERATURE: 99 F | HEIGHT: 67 IN | WEIGHT: 263.69 LBS | BODY MASS INDEX: 41.39 KG/M2

## 2020-10-29 DIAGNOSIS — C56.1 MALIGNANT NEOPLASM OF RIGHT OVARY: ICD-10-CM

## 2020-10-29 DIAGNOSIS — C78.6 PERITONEAL CARCINOMATOSIS: Primary | ICD-10-CM

## 2020-10-29 DIAGNOSIS — R09.81 CONGESTION OF NASAL SINUS: ICD-10-CM

## 2020-10-29 PROCEDURE — 99999 PR PBB SHADOW E&M-EST. PATIENT-LVL IV: ICD-10-PCS | Mod: PBBFAC,,, | Performed by: INTERNAL MEDICINE

## 2020-10-29 PROCEDURE — 99214 OFFICE O/P EST MOD 30 MIN: CPT | Mod: S$PBB,,, | Performed by: INTERNAL MEDICINE

## 2020-10-29 PROCEDURE — 99214 PR OFFICE/OUTPT VISIT, EST, LEVL IV, 30-39 MIN: ICD-10-PCS | Mod: S$PBB,,, | Performed by: INTERNAL MEDICINE

## 2020-10-29 PROCEDURE — 99999 PR PBB SHADOW E&M-EST. PATIENT-LVL IV: CPT | Mod: PBBFAC,,, | Performed by: INTERNAL MEDICINE

## 2020-10-29 PROCEDURE — 99214 OFFICE O/P EST MOD 30 MIN: CPT | Mod: PBBFAC | Performed by: INTERNAL MEDICINE

## 2020-10-29 RX ORDER — HEPARIN 100 UNIT/ML
500 SYRINGE INTRAVENOUS
Status: CANCELLED | OUTPATIENT
Start: 2020-10-29

## 2020-10-29 RX ORDER — DEXAMETHASONE 0.5 MG/1
8 TABLET ORAL
Status: CANCELLED
Start: 2020-10-29

## 2020-10-29 RX ORDER — DEXAMETHASONE 4 MG/1
4 TABLET ORAL EVERY 12 HOURS
Qty: 60 TABLET | Refills: 0 | Status: SHIPPED | OUTPATIENT
Start: 2020-10-29 | End: 2020-11-17

## 2020-10-29 RX ORDER — FLUTICASONE PROPIONATE 50 MCG
1 SPRAY, SUSPENSION (ML) NASAL EVERY 12 HOURS PRN
Qty: 16 G | Refills: 0 | Status: SHIPPED | OUTPATIENT
Start: 2020-10-29

## 2020-10-29 RX ORDER — SODIUM CHLORIDE 0.9 % (FLUSH) 0.9 %
10 SYRINGE (ML) INJECTION
Status: CANCELLED | OUTPATIENT
Start: 2020-10-29

## 2020-10-29 RX ORDER — EPINEPHRINE 0.3 MG/.3ML
0.3 INJECTION SUBCUTANEOUS ONCE AS NEEDED
Status: CANCELLED | OUTPATIENT
Start: 2020-10-29

## 2020-10-29 RX ORDER — DIPHENHYDRAMINE HYDROCHLORIDE 50 MG/ML
25 INJECTION INTRAMUSCULAR; INTRAVENOUS EVERY 10 MIN PRN
Status: CANCELLED | OUTPATIENT
Start: 2020-10-29

## 2020-10-29 RX ORDER — METHYLPREDNISOLONE SOD SUCC 125 MG
125 VIAL (EA) INJECTION ONCE AS NEEDED
Status: CANCELLED | OUTPATIENT
Start: 2020-10-29

## 2020-10-29 NOTE — NURSING
"1630  10/29/20  Pt was at clinic today to see MD and chemo.  U/A was pending and pt stated she really needed to be gone by 1530, so per Dr. Mckeon, she may leave and return tomorrow for Avastin pending appropriate results.  Pt states this is fine with her.  When results were available, I did let her know that the protein was high, as it has been previously, and that Dr. Mckeon would be contacting her with the next step.  She stated "thank you".  I did leave her iveth't with chemo on the book just in case.      "

## 2020-10-30 ENCOUNTER — PATIENT MESSAGE (OUTPATIENT)
Dept: HEMATOLOGY/ONCOLOGY | Facility: CLINIC | Age: 65
End: 2020-10-30

## 2020-11-02 ENCOUNTER — CLINICAL SUPPORT (OUTPATIENT)
Dept: REHABILITATION | Facility: HOSPITAL | Age: 65
End: 2020-11-02
Attending: PHYSICIAN ASSISTANT
Payer: MEDICARE

## 2020-11-02 DIAGNOSIS — M53.82 NECK MUSCLE WEAKNESS: Primary | ICD-10-CM

## 2020-11-02 DIAGNOSIS — M79.18 MYOFASCIAL PAIN ON RIGHT SIDE: ICD-10-CM

## 2020-11-02 DIAGNOSIS — M54.2 CERVICALGIA: ICD-10-CM

## 2020-11-02 PROCEDURE — 97014 ELECTRIC STIMULATION THERAPY: CPT

## 2020-11-02 PROCEDURE — 97110 THERAPEUTIC EXERCISES: CPT

## 2020-11-02 PROCEDURE — 97140 MANUAL THERAPY 1/> REGIONS: CPT

## 2020-11-02 NOTE — PROGRESS NOTES
Physical Therapy Treatment Note     Name: Casie Frias [facility]  Clinic Number: [id number]    Therapy Diagnosis:   Encounter Diagnoses   Name Primary?    Neck muscle weakness Yes    Myofascial pain on right side     Cervicalgia      Physician: Janki Oneil,*    Visit Date: 11/2/2020      Physician Orders: PT Eval and Treat   Medical Diagnosis from Referral:   M54.2 (ICD-10-CM) - Cervicalgia   M79.18 (ICD-10-CM) - Myofascial pain on right side         Evaluation Date: 9/24/2020  Authorization Period Expiration: 11/16/20  Plan of Care Expiration: 10/24/20  Visit # / Visits authorized: 11/ 12        Time In: 10:20  Time Out: 11:15  Total Billable Time:  38 minutes    Precautions: Anxiety, heart disease, history of cancer    Subjective     Pt reports: not having any pain since last visit except in her R wrist.  Pt. Reports her R wrist is sore with R pronation and R supination in lateral R wrist.   She was compliant with home exercise program.  Response to previous treatment: less discomfort.  Functional change: able to sleep comfortably    Pain: 0/10, tightness Location: right neck to R shoulder   4-5/10 R wrist    Objective       Casie received the following manual therapy techniques: x 17 min., cerv. Ext mob C1-6 x 4 min., STM R cervical paraspinals x 3 min., R AC joint mob x 2 min., R humeroulnar distraction mob x 2 min., R radiocarpal distraction x 2 min., R distal ulnar A-P, P-A glide mob x 4 min.       Casie received therapeutic exercise for 21 minutes, including:  Nu Step x 5 min., scap retraction 30# x 2 min., standing UT  X 2 min., standing middle trap x 2 min., standing lower trap 3# x 2 min., B ext. Rot with TB x 4 min., B int. Rot with TB x 4 min.    Casie received the following supervised modalities after being cleared for contradictions: TENS:  Casie received TENS electrical stimulation for pain to the cervical region. Pt received continuous mode x 10 minutes. Casie tolerated treatment well  without any adverse effects.      Casie received hot pack for 10 minutes to cervical region.    Home Exercises Provided and Patient Education Provided     Education provided:   - scapular strengthening therex    Written Home Exercises Provided: yes.  Exercises were reviewed and Casie was able to demonstrate them prior to the end of the session.  Casie demonstrated good  understanding of the education provided.     See EMR under Patient Instructions for exercises provided 9/28/2020.    Assessment   Casie is progressing well towards her goals. Pt prognosis is Good.   Pt. Cont. To have hypomobility with cervical extension and muscle tension in R cervical paraspinals.  Pt. Had no pain with cervical PROM at all levels and no pain with R shoulder PROM.  Mobilizations performed at R elbow and R wrist to help reduce pain.  Pt. Reduce to once a week secondary symptoms and pain level low.  Will D/C next visit if no increase in pain.    Pt will continue to benefit from skilled outpatient physical therapy to address the deficits listed in the problem list box on initial evaluation, provide pt/family education and to maximize pt's level of independence in the home and   community environment.     Pt's spiritual, cultural and educational needs considered and pt agreeable to plan of care and goals.     Anticipated barriers to physical therapy: none    Goals:  Short Term Goals: In 4 weeks   1.I with HEP  2.Patient to increase cervical ROM to 50% ROM in all planes of motion.   3.Patient to increase cervical MMT strength to 4/5 grossly.  4.Patient to have pain less than 5/10 at all times.  5. Pt. To have B shoulder ROM WNL.   5.Patient to score less than 25% on FOTO.     Long Term Goals: In 8 weeks  1. Patient to score less than 10% impaired on the FOTO.  2. Patient to demo increase in UE strength to 5/5 gross MMT to reach and carry overhead.  3. Patient to have decreased pain to less than 2/10 at all times to perform household  chores.  4. Patient to demo increase cervical strength to maintain proper joint alignment with sitting or standing activities.      Plan     Plan of care Certification: 1027/2020 to 11/27/20.     Outpatient Physical Therapy 1-2 times weekly for 4 weeks to include the following interventions: Manual Therapy, Moist Heat/ Ice, Neuromuscular Re-ed, Patient Education, Self Care, Therapeutic Activites and Therapeutic Exercise, e-stim.        Thank you for this referral.  Lulu Swan, PT

## 2020-11-09 ENCOUNTER — CLINICAL SUPPORT (OUTPATIENT)
Dept: REHABILITATION | Facility: HOSPITAL | Age: 65
End: 2020-11-09
Attending: PHYSICIAN ASSISTANT
Payer: MEDICARE

## 2020-11-09 DIAGNOSIS — M53.82 NECK MUSCLE WEAKNESS: Primary | ICD-10-CM

## 2020-11-09 DIAGNOSIS — M54.2 CERVICALGIA: ICD-10-CM

## 2020-11-09 DIAGNOSIS — M79.18 MYOFASCIAL PAIN ON RIGHT SIDE: ICD-10-CM

## 2020-11-09 PROCEDURE — 97140 MANUAL THERAPY 1/> REGIONS: CPT

## 2020-11-09 PROCEDURE — 97110 THERAPEUTIC EXERCISES: CPT

## 2020-11-09 NOTE — PROGRESS NOTES
Physical Therapy Treatment Note     Name: Casie Frias [facility]  Clinic Number: [id number]    Therapy Diagnosis:   Encounter Diagnoses   Name Primary?    Neck muscle weakness Yes    Myofascial pain on right side     Cervicalgia      Physician: Janki Oneil,*    Visit Date: 11/9/2020      Physician Orders: PT Eval and Treat   Medical Diagnosis from Referral:   M54.2 (ICD-10-CM) - Cervicalgia   M79.18 (ICD-10-CM) - Myofascial pain on right side         Evaluation Date: 9/24/2020  Authorization Period Expiration: 11/16/20  Plan of Care Expiration: 10/24/20  Visit # / Visits authorized: 12/ 12        Time In: 11:10  Time Out: 11:50  Total Billable Time:   minutes    Precautions: Anxiety, heart disease, history of cancer    Subjective     Pt reports: not having any pain.     She was compliant with home exercise program.  Response to previous treatment: less discomfort.  Functional change: able to sleep comfortably    Pain: 0/10, tightness Location: right neck to R shoulder   4-5/10 R wrist    Objective       Casie received the following manual therapy techniques x 10 min.: manual cervical traction x 2 min., cerv. Ext mob C1-6 x 4 min., R GH joint mob x 4 min.    Casie received therapeutic exercise for 20 minutes, including:  UBE x 4 min., scap retraction 30# x 2 min., standing UT  X 2 min., standing middle trap x 2 min.,  B ext. Rot with TB x 2 min., standing chin tuck with TB x 2 min., Reji chin tuck with GTB with shoulder flex/ hip flex x 4 min., wall push ups x 2 min.      Home Exercises Provided and Patient Education Provided     Education provided:   - scapular strengthening therex    Written Home Exercises Provided: yes.  Exercises were reviewed and Casie was able to demonstrate them prior to the end of the session.  Casie demonstrated good  understanding of the education provided.     See EMR under Patient Instructions for exercises provided 9/28/2020.    Assessment   Casie is progressing well towards  her goals. Pt prognosis is Good.   See D/C Pt. Educated to return to skilled PT if pain returns.  No hypomobility noted along C spine today.  No tension noted in cervical paraspinals today.   Pt. Had no pain with L shoulder PROM today.        Pt's spiritual, cultural and educational needs considered and pt agreeable to plan of care and goals.     Anticipated barriers to physical therapy: none    Goals:  Short Term Goals: In 4 weeks   1.I with HEP  2.Patient to increase cervical ROM to 50% ROM in all planes of motion.   3.Patient to increase cervical MMT strength to 4/5 grossly.  4.Patient to have pain less than 5/10 at all times.  5. Pt. To have B shoulder ROM WNL.   5.Patient to score less than 25% on FOTO.     Long Term Goals: In 8 weeks  1. Patient to score less than 10% impaired on the FOTO.  2. Patient to demo increase in UE strength to 5/5 gross MMT to reach and carry overhead.  3. Patient to have decreased pain to less than 2/10 at all times to perform household chores.  4. Patient to demo increase cervical strength to maintain proper joint alignment with sitting or standing activities.      Plan     Plan of care Certification: D/C with HEP.      Thank you for this referral.  Lulu Swan, PT

## 2020-11-09 NOTE — PLAN OF CARE
Outpatient Therapy Discharge Summary     Name: Casie Gaines  Clinic Number: [id number]    Therapy Diagnosis:   Encounter Diagnoses   Name Primary?    Neck muscle weakness Yes    Myofascial pain on right side     Cervicalgia      Physician: Janki Oneil,*    Physician Orders: PT Eval and Treat   Medical Diagnosis from Referral:   M54.2 (ICD-10-CM) - Cervicalgia   M79.18 (ICD-10-CM) - Myofascial pain on right side         Evaluation Date: 9/24/2020    Date of Last visit: 11/9/20  Total Visits Received: 12  Cancelled Visits: 1  No Show Visits: 0    CMS Impairment/Limitation/Restriction for FOTO Neck Survey    Therapist reviewed FOTO scores for Casie Gaines on 11/9/20.   FOTO documents entered into [employer] - see Media section.    Limitation Score: 28%       Assessment    Goals: Short Term Goals: In 4 weeks   1.I with HEP Met  2.Patient to increase cervical ROM to 50% ROM in all planes of motion.  Met  3.Patient to increase cervical MMT strength to 4/5 grossly. Progressed  4.Patient to have pain less than 5/10 at all times. Met  5. Pt. To have B shoulder ROM WNL.  Met  5.Patient to score less than 25% on FOTO. Progressed     Long Term Goals: In 8 weeks  1. Patient to score less than 10% impaired on the FOTO. Not met  2. Patient to demo increase in UE strength to 5/5 gross MMT to reach and carry overhead. Progressed  3. Patient to have decreased pain to less than 2/10 at all times to perform household chores. Met  4. Patient to demo increase cervical strength to maintain proper joint alignment with sitting or standing activities. Met      Discharge reason: Patient is now asymptomatic    Plan   This patient is discharged from Physical Therapy

## 2020-11-11 ENCOUNTER — PATIENT OUTREACH (OUTPATIENT)
Dept: OTHER | Facility: OTHER | Age: 65
End: 2020-11-11

## 2020-11-11 NOTE — PROGRESS NOTES
Digital Medicine: Clinician Follow-Up    Patient indicates BP will increase after an infusion, then will decrease. Patient reports BP therapy being managed by Oncologist, Dr. Mckeon, as cancer tx causing increased BP. Reports sometimes feeling a headache after infusion and is going to speak with Dr. Mckeon how to combat this symptom after infusion days. Patient declines HTN medication changes.     The history is provided by the patient.   Follow-up reason(s): routine follow up.     Hypertension    Readings are trending down   Patient is not experiencing signs/symptoms of hypotension.  Patient is experiencing signs/symptoms of hypertension.            Last 5 Patient Entered Readings                                      Current 30 Day Average: 160/86     Recent Readings 11/10/2020 11/9/2020 11/8/2020 11/5/2020 11/3/2020    SBP (mmHg) 145 153 159 168 172    DBP (mmHg) 92 85 97 87 89    Pulse 92 85 85 88 88            Depression Screening  Did not address depression screening.    Sleep Apnea Screening    Did not address sleep apnea screening.     Medication Affordability Screening  Did not address medication affordability screening.     Medication Adherence-Medication adherence was assessed.          ASSESSMENT(S)  Patients BP average is 160/86 mmHg, which is above goal. Patient's BP goal is less than or equal to 130/80.     As BP average remains elevated, may recommend increase in hctz to 25 mg daily and reassessment of BMP in 2 weeks as K+ currently 3.6 mmol/L or initiate metoprolol succinate 25 mg tab daily as HR within normal limits. No DDI with patient's current regimen.      Hypertension Plan  Continue current therapy.  Await MD intervention. Sent staff message regarding potential inc in hctz to 25 mg and reassessing bmp two weeks after dose adjustment or initiation of metoprolol succinate 25 mg tab daily.  Patient declined medication changes. Patient requests only Dr. Mckeon make BP med changes.       Addressed  patient questions and patient has my contact information if needed prior to next outreach. Patient verbalizes understanding.             There are no preventive care reminders to display for this patient.  There are no preventive care reminders to display for this patient.      Hypertension Medications             amLODIPine (NORVASC) 5 MG tablet Take 2 tablets (10 mg total) by mouth once daily.    olmesartan-hydrochlorothiazide (BENICAR HCT) 40-12.5 mg Tab Take 1 tablet by mouth once daily.

## 2020-11-12 ENCOUNTER — TELEPHONE (OUTPATIENT)
Dept: HEMATOLOGY/ONCOLOGY | Facility: CLINIC | Age: 65
End: 2020-11-12

## 2020-11-12 NOTE — TELEPHONE ENCOUNTER
Sw intern attempted to contact pt about her recent distress screening. Pt did not answer call at this time and did she have a answering . Sw intern and supervisor will continue to follow.

## 2020-11-17 ENCOUNTER — OFFICE VISIT (OUTPATIENT)
Dept: HEMATOLOGY/ONCOLOGY | Facility: CLINIC | Age: 65
End: 2020-11-17
Payer: MEDICARE

## 2020-11-17 ENCOUNTER — LAB VISIT (OUTPATIENT)
Dept: LAB | Facility: HOSPITAL | Age: 65
End: 2020-11-17
Attending: INTERNAL MEDICINE
Payer: MEDICARE

## 2020-11-17 VITALS
BODY MASS INDEX: 40.48 KG/M2 | WEIGHT: 257.94 LBS | HEIGHT: 67 IN | OXYGEN SATURATION: 98 % | DIASTOLIC BLOOD PRESSURE: 82 MMHG | HEART RATE: 97 BPM | TEMPERATURE: 97 F | SYSTOLIC BLOOD PRESSURE: 145 MMHG

## 2020-11-17 DIAGNOSIS — C56.3 MALIGNANT NEOPLASM OF BOTH OVARIES: Primary | ICD-10-CM

## 2020-11-17 DIAGNOSIS — I10 HYPERTENSION, UNSPECIFIED TYPE: ICD-10-CM

## 2020-11-17 DIAGNOSIS — R60.9 EDEMA, UNSPECIFIED TYPE: ICD-10-CM

## 2020-11-17 DIAGNOSIS — K92.1 MELENA: ICD-10-CM

## 2020-11-17 DIAGNOSIS — C78.6 PERITONEAL CARCINOMATOSIS: ICD-10-CM

## 2020-11-17 DIAGNOSIS — C56.9 MALIGNANT NEOPLASM OF OVARY, UNSPECIFIED LATERALITY: ICD-10-CM

## 2020-11-17 LAB
BACTERIA #/AREA URNS HPF: NORMAL /HPF
BILIRUB UR QL STRIP: NEGATIVE
CLARITY UR: CLEAR
COLOR UR: YELLOW
GLUCOSE UR QL STRIP: NEGATIVE
HGB UR QL STRIP: ABNORMAL
HYALINE CASTS #/AREA URNS LPF: 0 /LPF
KETONES UR QL STRIP: NEGATIVE
LEUKOCYTE ESTERASE UR QL STRIP: NEGATIVE
MICROSCOPIC COMMENT: NORMAL
NITRITE UR QL STRIP: NEGATIVE
PH UR STRIP: 7 [PH] (ref 5–8)
PROT UR QL STRIP: ABNORMAL
RBC #/AREA URNS HPF: 2 /HPF (ref 0–4)
SP GR UR STRIP: 1.02 (ref 1–1.03)
SQUAMOUS #/AREA URNS HPF: 3 /HPF
URN SPEC COLLECT METH UR: ABNORMAL
WBC #/AREA URNS HPF: 1 /HPF (ref 0–5)

## 2020-11-17 PROCEDURE — 99215 OFFICE O/P EST HI 40 MIN: CPT | Mod: PBBFAC | Performed by: INTERNAL MEDICINE

## 2020-11-17 PROCEDURE — 99999 PR PBB SHADOW E&M-EST. PATIENT-LVL V: ICD-10-PCS | Mod: PBBFAC,,, | Performed by: INTERNAL MEDICINE

## 2020-11-17 PROCEDURE — 99999 PR PBB SHADOW E&M-EST. PATIENT-LVL V: CPT | Mod: PBBFAC,,, | Performed by: INTERNAL MEDICINE

## 2020-11-17 PROCEDURE — 81000 URINALYSIS NONAUTO W/SCOPE: CPT

## 2020-11-17 PROCEDURE — 99215 PR OFFICE/OUTPT VISIT, EST, LEVL V, 40-54 MIN: ICD-10-PCS | Mod: S$PBB,,, | Performed by: INTERNAL MEDICINE

## 2020-11-17 PROCEDURE — 99215 OFFICE O/P EST HI 40 MIN: CPT | Mod: S$PBB,,, | Performed by: INTERNAL MEDICINE

## 2020-11-17 RX ORDER — AMLODIPINE BESYLATE 5 MG/1
10 TABLET ORAL DAILY
Qty: 60 TABLET | Refills: 11 | Status: SHIPPED | OUTPATIENT
Start: 2020-11-17 | End: 2021-11-05 | Stop reason: SDUPTHER

## 2020-11-17 NOTE — PROGRESS NOTES
Subjective:       Patient ID: Casie Gaines is a 65 y.o. female.    Chief Complaint: Malignant neoplasm of both ovaries [C56.1, C56.2]  HPI: We have an opportunity to see Ms. Casie Gaines in Hematology Oncology clinic at [facility] on 11/16/2020.  Ms. Casie Gaines is a 65 y.o. woman with peritoneal carcinomatosis with malignant right pleural effusion.  Final pathology is consistent with ovarian primary with  2740.  PET scan demonstrates multiple omental and peritoneal avid masses consistent with peritoneal carcinomatosis, extensive retroperitoneal and mediastinal lymphadenopathy, masslike structure in the right iliac fossa likely representing ovarian mass.  Patient has been evaluated by GYN Oncology Dr. Juarez and MD Green with recommendations for him carboplatin/Taxol/Avastin.      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  S/p 6 cycle avastin taxol carboplatin.  Has great WI after 6 cycles. On 8/15/2019 underwent salpingoophorectomy. Pathology showed CR. Currently on maintenance avastin.     Oncology History   Peritoneal carcinomatosis   1/26/2019 Initial Diagnosis    Peritoneal carcinomatosis     2/15/2019 - 7/8/2019 Chemotherapy    Treatment Summary   Plan Name: OP GYN PACLITAXEL CARBOPLATIN (AUC 6) Q3W  Treatment Goal: Palliative  Status: Inactive  Start Date: 2/28/2019  End Date: 6/18/2019  Provider: Will Trevizo Jr., MD  Chemotherapy: bevacizumab (AVASTIN) 15 mg/kg = 1,600 mg in sodium chloride 0.9% 100 mL chemo infusion, 15 mg/kg = 1,600 mg (100 % of original dose 15 mg/kg), Intravenous, Clinic/HOD 1 time, 6 of 6 cycles  Dose modification: 15 mg/kg (original dose 15 mg/kg, Cycle 1)  Administration: 1,600 mg (2/28/2019), 1,600 mg (3/21/2019), 1,600 mg (4/11/2019), 1,600 mg (5/7/2019), 1,600 mg (5/28/2019), 1,510 mg (6/18/2019)  CARBOplatin (PARAPLATIN) 870 mg in sodium chloride 0.9% 337 mL chemo infusion, 870 mg (100 % of original dose  868.8 mg), Intravenous, Clinic/HOD 1 time, 6 of 6 cycles  Dose modification:   (original dose 868.8 mg, Cycle 1)  Administration: 870 mg (2/28/2019), 870 mg (3/21/2019), 900 mg (4/11/2019), 870 mg (5/7/2019), 660 mg (5/28/2019), 690 mg (6/18/2019)  PACLitaxel (TAXOL) 175 mg/m2 = 396 mg in sodium chloride 0.9% 566 mL chemo infusion, 175 mg/m2 = 396 mg, Intravenous, Clinic/HOD 1 time, 6 of 6 cycles  Dose modification: 140 mg/m2 (80 % of original dose 175 mg/m2, Cycle 5)  Administration: 396 mg (2/28/2019), 396 mg (3/21/2019), 396 mg (4/11/2019), 396 mg (5/7/2019), 318 mg (5/28/2019), 306 mg (6/18/2019)     10/9/2019 -  Chemotherapy    Treatment Summary   Plan Name: OP BEVACIZUMAB Q3W   Treatment Goal: Maintenance  Status: Active  Start Date: 10/9/2019  End Date: 2/11/2021 (Planned)  Provider: Oscar Mckeon MD  Chemotherapy: bevacizumab (AVASTIN) 15 mg/kg = 1,615 mg in sodium chloride 0.9% 100 mL chemo infusion, 15 mg/kg = 1,615 mg, Intravenous, Clinic/HOD 1 time, 11 of 17 cycles  Administration: 1,615 mg (10/9/2019), 1,615 mg (10/29/2019), 1,615 mg (12/10/2019), 1,615 mg (1/21/2020), 1,600 mg (4/2/2020), 1,615 mg (4/23/2020), 1,600 mg (7/1/2020), 1,600 mg (7/22/2020), 1,600 mg (8/13/2020), 1,795 mg (9/3/2020), 1,795 mg (9/24/2020)     Malignant neoplasm of right ovary   2/15/2019 Initial Diagnosis    Malignant neoplasm of right ovary     2/15/2019 - 7/8/2019 Chemotherapy    Treatment Summary   Plan Name: OP GYN PACLITAXEL CARBOPLATIN (AUC 6) Q3W  Treatment Goal: Palliative  Status: Inactive  Start Date: 2/28/2019  End Date: 6/18/2019  Provider: Will Trevizo Jr., MD  Chemotherapy: bevacizumab (AVASTIN) 15 mg/kg = 1,600 mg in sodium chloride 0.9% 100 mL chemo infusion, 15 mg/kg = 1,600 mg (100 % of original dose 15 mg/kg), Intravenous, Clinic/HOD 1 time, 6 of 6 cycles  Dose modification: 15 mg/kg (original dose 15 mg/kg, Cycle 1)  Administration: 1,600 mg (2/28/2019), 1,600 mg (3/21/2019), 1,600 mg (4/11/2019),  1,600 mg (5/7/2019), 1,600 mg (5/28/2019), 1,510 mg (6/18/2019)  CARBOplatin (PARAPLATIN) 870 mg in sodium chloride 0.9% 337 mL chemo infusion, 870 mg (100 % of original dose 868.8 mg), Intravenous, Clinic/HOD 1 time, 6 of 6 cycles  Dose modification:   (original dose 868.8 mg, Cycle 1)  Administration: 870 mg (2/28/2019), 870 mg (3/21/2019), 900 mg (4/11/2019), 870 mg (5/7/2019), 660 mg (5/28/2019), 690 mg (6/18/2019)  PACLitaxel (TAXOL) 175 mg/m2 = 396 mg in sodium chloride 0.9% 566 mL chemo infusion, 175 mg/m2 = 396 mg, Intravenous, Clinic/HOD 1 time, 6 of 6 cycles  Dose modification: 140 mg/m2 (80 % of original dose 175 mg/m2, Cycle 5)  Administration: 396 mg (2/28/2019), 396 mg (3/21/2019), 396 mg (4/11/2019), 396 mg (5/7/2019), 318 mg (5/28/2019), 306 mg (6/18/2019)     10/9/2019 -  Chemotherapy    Treatment Summary   Plan Name: OP BEVACIZUMAB Q3W   Treatment Goal: Maintenance  Status: Active  Start Date: 10/9/2019  End Date: 12/21/2020 (Planned)  Provider: Oscar Mckeon MD  Chemotherapy: bevacizumab (AVASTIN) 15 mg/kg = 1,615 mg in sodium chloride 0.9% 100 mL chemo infusion, 15 mg/kg = 1,615 mg, Intravenous, Clinic/HOD 1 time, 6 of 17 cycles  Administration: 1,615 mg (10/9/2019), 1,615 mg (10/29/2019), 1,615 mg (12/10/2019), 1,615 mg (1/21/2020), 1,600 mg (4/2/2020), 1,615 mg (4/23/2020)     Malignant neoplasm of both ovaries   2/18/2019 Initial Diagnosis    Ovarian cancer     10/9/2019 -  Chemotherapy    Treatment Summary   Plan Name: OP BEVACIZUMAB Q3W   Treatment Goal: Maintenance  Status: Active  Start Date: 10/9/2019  End Date: 12/21/2020 (Planned)  Provider: Oscar Mckeon MD  Chemotherapy: bevacizumab (AVASTIN) 15 mg/kg = 1,615 mg in sodium chloride 0.9% 100 mL chemo infusion, 15 mg/kg = 1,615 mg, Intravenous, Clinic/HOD 1 time, 6 of 17 cycles  Administration: 1,615 mg (10/9/2019), 1,615 mg (10/29/2019), 1,615 mg (12/10/2019), 1,615 mg (1/21/2020), 1,600 mg (4/2/2020), 1,615 mg (4/23/2020)     Ovarian  cancer, bilateral   8/15/2019 Initial Diagnosis    Ovarian cancer, bilateral     10/9/2019 -  Chemotherapy    Treatment Summary   Plan Name: OP BEVACIZUMAB Q3W   Treatment Goal: Maintenance  Status: Active  Start Date: 10/9/2019  End Date: 2020 (Planned)  Provider: Oscar Mckeon MD  Chemotherapy: bevacizumab (AVASTIN) 15 mg/kg = 1,615 mg in sodium chloride 0.9% 100 mL chemo infusion, 15 mg/kg = 1,615 mg, Intravenous, Clinic/HOD 1 time, 6 of 17 cycles  Administration: 1,615 mg (10/9/2019), 1,615 mg (10/29/2019), 1,615 mg (12/10/2019), 1,615 mg (2020), 1,600 mg (2020), 1,615 mg (2020)       Past Medical History:   Diagnosis Date    Anemia     Anxiety     Arthritis     knees    Cancer     ovarian    CHF (congestive heart failure)     Hx antineoplastic chemotherapy     last 2019    Hyperlipemia     Hypertension     Neck pain     Ovarian cancer 2019    CHEMO    Peritoneal carcinomatosis 2019     Family History   Problem Relation Age of Onset    Anesthesia problems Other     Breast cancer Maternal Aunt     Breast cancer Paternal Aunt     Ovarian cancer Paternal Aunt     Colon cancer Brother     Thrombophilia Neg Hx      Social History     Socioeconomic History    Marital status:      Spouse name: Not on file    Number of children: Not on file    Years of education: Not on file    Highest education level: Not on file   Occupational History    Not on file   Social Needs    Financial resource strain: Not on file    Food insecurity     Worry: Not on file     Inability: Not on file    Transportation needs     Medical: Not on file     Non-medical: Not on file   Tobacco Use    Smoking status: Former Smoker     Packs/day: 1.00     Years: 25.00     Pack years: 25.00     Quit date: 10/1/2018     Years since quittin.1    Smokeless tobacco: Never Used    Tobacco comment: States started quit 2 months ago after 30 years   Substance and Sexual Activity    Alcohol use:  Never     Alcohol/week: 0.0 standard drinks     Frequency: Never     Comment: occasionally  No alcohol 72h prior to sx    Drug use: No    Sexual activity: Not Currently     Partners: Male     Comment: hyst; mut monog   Lifestyle    Physical activity     Days per week: Not on file     Minutes per session: Not on file    Stress: Only a little   Relationships    Social connections     Talks on phone: Not on file     Gets together: Not on file     Attends Christianity service: Not on file     Active member of club or organization: Not on file     Attends meetings of clubs or organizations: Not on file     Relationship status: Not on file   Other Topics Concern    Not on file   Social History Narrative    Live w/ spouse and 2 dogs      Past Surgical History:   Procedure Laterality Date    breast reduction  10/02/2018     SECTION      X 1    CYSTOSCOPY W/ URETERAL STENT PLACEMENT Right 2019    Procedure: CYSTOSCOPY, WITH URETERAL STENT INSERTION;  Surgeon: Timmy Santiago IV, MD;  Location: [facility];  Service: Urology;  Laterality: Right;    CYSTOSCOPY W/ URETERAL STENT REMOVAL  10/04/2019    DILATION AND CURETTAGE OF UTERUS      HYSTERECTOMY      RALH for fibroids (still has ovaries)    INSERTION OF VENOUS ACCESS PORT Left 2019    Procedure: INSERTION, VENOUS ACCESS PORT;  Surgeon: Ulisses Monzon MD;  Location: [facility] OR;  Service: General;  Laterality: Left;  Left internal jugular     LYSIS OF ADHESIONS OF URETER N/A 8/15/2019    Procedure: URETEROLYSIS;  Surgeon: Ismael Juarez MD;  Location: [facility] OR;  Service: OB/GYN;  Laterality: N/A;    OMENTECTOMY N/A 8/15/2019    Procedure: OMENTECTOMY;  Surgeon: Ismael Juarez MD;  Location: [facility] OR;  Service: OB/GYN;  Laterality: N/A;    RETROGRADE PYELOGRAPHY Right 2019    Procedure: PYELOGRAM, RETROGRADE;  Surgeon: Timmy Santiago IV, MD;  Location: [facility] OR;  Service: Urology;  Laterality: Right;    ROBOT-ASSISTED LAPAROSCOPIC  SALPINGO-OOPHORECTOMY USING DA TIA XI Bilateral 8/15/2019    Procedure: XI ROBOTIC SALPINGO-OOPHORECTOMY;  Surgeon: Ismael Juarez MD;  Location: [facility];  Service: OB/GYN;  Laterality: Bilateral;    TOTAL REDUCTION MAMMOPLASTY  2018    TUBAL LIGATION       Current Outpatient Medications   Medication Sig Dispense Refill    albuterol 90 mcg/actuation inhaler Inhale 2 puffs into the lungs every 4 (four) hours as needed for Wheezing. Rescue 18 g 0    ALPRAZolam (XANAX) 0.25 MG tablet Take 1 tablet (0.25 mg total) by mouth 3 (three) times daily as needed for Anxiety. 60 tablet 2    amLODIPine (NORVASC) 5 MG tablet Take 2 tablets (10 mg total) by mouth once daily. 60 tablet 11    biotin 1 mg tablet Take 1,000 mcg by mouth once daily.       cetirizine (ZYRTEC) 10 MG tablet Take 1 tablet (10 mg total) by mouth once daily. 30 tablet 5    dexAMETHasone (DECADRON) 4 MG Tab Take 1 tablet (4 mg total) by mouth every 12 (twelve) hours. 60 tablet 0    diclofenac sodium (VOLTAREN) 1 % Gel Apply once a day to back as needed 100 g 1    docusate sodium (COLACE) 100 MG capsule Take 1 capsule (100 mg total) by mouth 2 (two) times daily as needed for Constipation. 60 capsule 1    EPINEPHrine (EPIPEN) 0.3 mg/0.3 mL AtIn Inject 0.3 mLs (0.3 mg total) into the muscle once. for 1 dose 2 each 0    fluticasone propionate (FLONASE) 50 mcg/actuation nasal spray 1 spray (50 mcg total) by Each Nostril route every 12 (twelve) hours as needed for Rhinitis. 16 g 0    gabapentin (NEURONTIN) 100 MG capsule Take 1 capsule (100 mg total) by mouth 3 (three) times daily. 90 capsule 11    ginkgo biloba 40 mg Tab Take 40 mg by mouth.      hydrOXYzine HCL (ATARAX) 25 MG tablet Take 1 tablet (25 mg total) by mouth 3 (three) times daily as needed for Itching. 90 tablet 1    methocarbamoL (ROBAXIN) 500 MG Tab Take 1 tablet (500 mg total) by mouth 3 (three) times daily as needed (muscle spasms). 60 tablet 0    multivitamin with minerals tablet  Take 1 tablet by mouth once daily.       olmesartan-hydrochlorothiazide (BENICAR HCT) 40-12.5 mg Tab Take 1 tablet by mouth once daily. 90 tablet 0    polyethylene glycol (GLYCOLAX) 17 gram/dose powder Take 17 g by mouth daily as needed (constipation). 510 g 0    rosuvastatin (CRESTOR) 10 MG tablet Take 1 tablet (10 mg total) by mouth once daily. 90 tablet 1    sertraline (ZOLOFT) 25 MG tablet Take 1 tablet (25 mg total) by mouth once daily. 30 tablet 11    zinc gluconate 50 mg tablet Take 50 mg by mouth once daily.       No current facility-administered medications for this visit.        Labs:  Lab Results   Component Value Date    WBC 9.61 10/29/2020    HGB 12.0 10/29/2020    HCT 38.9 10/29/2020    MCV 89 10/29/2020     10/29/2020     BMP  Lab Results   Component Value Date     10/29/2020    K 3.6 10/29/2020     10/29/2020    CO2 28 10/29/2020    BUN 10 10/29/2020    CREATININE 0.8 10/29/2020    CALCIUM 9.0 10/29/2020    ANIONGAP 8 10/29/2020    ESTGFRAFRICA >60 10/29/2020    EGFRNONAA >60 10/29/2020     Lab Results   Component Value Date    ALT 22 10/29/2020    AST 22 10/29/2020    ALKPHOS 99 10/29/2020    BILITOT 0.5 10/29/2020       No results found for: IRON, TIBC, FERRITIN, SATURATEDIRO  No results found for: MIDGEOQU11  No results found for: FOLATE  Lab Results   Component Value Date    TSH 1.869 04/19/2013       I have reviewed the radiology reports and examined the scan/xray images.    Review of Systems   Constitutional: Negative.    HENT: Negative.    Eyes: Negative.    Respiratory: Negative.    Cardiovascular: Negative.    Gastrointestinal: Negative.    Endocrine: Negative.    Genitourinary: Negative.    Musculoskeletal: Negative.    Skin: Negative.    Allergic/Immunologic: Negative.    Neurological: Negative.    Hematological: Negative.    Psychiatric/Behavioral: Negative.      ECOG SCORE              Objective:   There were no vitals filed for this visit.There is no height or  weight on file to calculate BMI.  Physical Exam  Vitals signs and nursing note reviewed.   Constitutional:       Appearance: She is well-developed.   HENT:      Head: Normocephalic and atraumatic.   Eyes:      Conjunctiva/sclera: Conjunctivae normal.   Neck:      Musculoskeletal: Normal range of motion and neck supple.   Cardiovascular:      Rate and Rhythm: Normal rate and regular rhythm.   Pulmonary:      Effort: Pulmonary effort is normal.      Breath sounds: Normal breath sounds.   Abdominal:      General: Bowel sounds are normal.      Palpations: Abdomen is soft.   Musculoskeletal: Normal range of motion.   Skin:     General: Skin is warm and dry.   Neurological:      Mental Status: She is alert and oriented to person, place, and time.   Psychiatric:         Behavior: Behavior normal.         Thought Content: Thought content normal.         Judgment: Judgment normal.           Assessment:      1. Malignant neoplasm of both ovaries    2. Peritoneal carcinomatosis    3. Melena    4. Edema, unspecified type    5. Hypertension, unspecified type    6. Malignant neoplasm of ovary, unspecified laterality           Plan:     Malignant neoplasm of both ovaries  Has persistent 3+ proteinuria.  Will stop avastin.  Will discuss with Dr. Juarez regarding maintenance niraparib.  -     Ambulatory referral/consult to Gynecologic Oncology; Future; Expected date: 11/24/2020    Peritoneal carcinomatosis  -     US Abdomen Complete; Future; Expected date: 11/17/2020    Melena  -     Ambulatory referral/consult to Gastroenterology; Future; Expected date: 11/24/2020    Edema, unspecified type  -     US Lower Extremity Veins Bilateral; Future; Expected date: 11/17/2020    Hypertension, unspecified type  -     Ambulatory referral/consult to Nephrology; Future; Expected date: 11/24/2020    Malignant neoplasm of ovary, unspecified laterality  -     amLODIPine (NORVASC) 5 MG tablet; Take 2 tablets (10 mg total) by mouth once daily.   Dispense: 60 tablet; Refill: 11

## 2020-11-18 ENCOUNTER — HOSPITAL ENCOUNTER (OUTPATIENT)
Dept: RADIOLOGY | Facility: HOSPITAL | Age: 65
Discharge: HOME | End: 2020-11-18
Attending: INTERNAL MEDICINE
Payer: MEDICARE

## 2020-11-18 DIAGNOSIS — C78.6 PERITONEAL CARCINOMATOSIS: ICD-10-CM

## 2020-11-18 DIAGNOSIS — R60.9 EDEMA, UNSPECIFIED TYPE: ICD-10-CM

## 2020-11-18 PROCEDURE — 93970 EXTREMITY STUDY: CPT | Mod: 26,,, | Performed by: RADIOLOGY

## 2020-11-18 PROCEDURE — 76700 US ABDOMEN COMPLETE: ICD-10-PCS | Mod: 26,,, | Performed by: RADIOLOGY

## 2020-11-18 PROCEDURE — 93970 US LOWER EXTREMITY VEINS BILATERAL: ICD-10-PCS | Mod: 26,,, | Performed by: RADIOLOGY

## 2020-11-18 PROCEDURE — 76700 US EXAM ABDOM COMPLETE: CPT | Mod: TC

## 2020-11-18 PROCEDURE — 93970 EXTREMITY STUDY: CPT | Mod: TC

## 2020-11-18 PROCEDURE — 76700 US EXAM ABDOM COMPLETE: CPT | Mod: 26,,, | Performed by: RADIOLOGY

## 2020-11-19 ENCOUNTER — OFFICE VISIT (OUTPATIENT)
Dept: INTERNAL MEDICINE | Facility: CLINIC | Age: 65
End: 2020-11-19
Payer: MEDICARE

## 2020-11-19 VITALS
HEART RATE: 94 BPM | SYSTOLIC BLOOD PRESSURE: 124 MMHG | OXYGEN SATURATION: 97 % | TEMPERATURE: 98 F | BODY MASS INDEX: 40.28 KG/M2 | DIASTOLIC BLOOD PRESSURE: 80 MMHG | WEIGHT: 256.63 LBS | HEIGHT: 67 IN

## 2020-11-19 DIAGNOSIS — I10 HYPERTENSION, UNSPECIFIED TYPE: Primary | ICD-10-CM

## 2020-11-19 DIAGNOSIS — M25.531 BILATERAL WRIST PAIN: ICD-10-CM

## 2020-11-19 DIAGNOSIS — M25.532 BILATERAL WRIST PAIN: ICD-10-CM

## 2020-11-19 DIAGNOSIS — C56.3 OVARIAN CANCER, BILATERAL: ICD-10-CM

## 2020-11-19 PROBLEM — Z01.810 PREOP CARDIOVASCULAR EXAM: Status: RESOLVED | Noted: 2019-02-14 | Resolved: 2020-11-19

## 2020-11-19 PROCEDURE — 99215 OFFICE O/P EST HI 40 MIN: CPT | Mod: PBBFAC | Performed by: FAMILY MEDICINE

## 2020-11-19 PROCEDURE — 99214 PR OFFICE/OUTPT VISIT, EST, LEVL IV, 30-39 MIN: ICD-10-PCS | Mod: S$PBB,,, | Performed by: FAMILY MEDICINE

## 2020-11-19 PROCEDURE — 99214 OFFICE O/P EST MOD 30 MIN: CPT | Mod: S$PBB,,, | Performed by: FAMILY MEDICINE

## 2020-11-19 PROCEDURE — 99999 PR PBB SHADOW E&M-EST. PATIENT-LVL V: CPT | Mod: PBBFAC,,, | Performed by: FAMILY MEDICINE

## 2020-11-19 PROCEDURE — 99999 PR PBB SHADOW E&M-EST. PATIENT-LVL V: ICD-10-PCS | Mod: PBBFAC,,, | Performed by: FAMILY MEDICINE

## 2020-11-19 RX ORDER — ATENOLOL 25 MG/1
25 TABLET ORAL DAILY
Qty: 30 TABLET | Refills: 11 | Status: SHIPPED | OUTPATIENT
Start: 2020-11-19 | End: 2021-10-17 | Stop reason: SDUPTHER

## 2020-11-19 NOTE — PROGRESS NOTES
Casie Gaines  11/19/2020  [id number]    Rossana Ang MD  Patient Care Team:  Rossana Ang MD as PCP - General (Family Medicine)  Radha Foley LPN as Care Coordinator (Internal Medicine)  Rita Sawant, PharmD as Pharmacist (Oncology)  Baltazar Hernández as Digital Medicine Health   Gerri Meyers RD as Dietitian (Nutrition)  Jessica Brink, PharmD as Hypertension Digital Medicine Clinician (Pharmacist)  Rossana Ang MD as Hypertension Digital Medicine Responsible Provider (Family Medicine)  Medicare Ffs as Hypertension Digital Medicine Contract  Has the patient seen any provider outside of the [facility] network since the last visit? (no). If yes, HIPPA forms completed and records requested.        Visit Type:a scheduled routine follow-up visit    Chief Complaint:  Chief Complaint   Patient presents with    Referral     for OT. for her wrist and hands.       History of Present Illness:  Here for BP recheck, wants referral for OT for her hand and wrist. She completed PT for her shoulder, which did improve. She has wrist swelling and pain r>L. Suspect carpal tunnel or arthritis, and wants to try PT first.     She is enrolled in digital HTN.  I reviewed her BP log    History of Peritoneal carinomatosis and Ovarian cancer  Followed by [facility]  MsBrenda Gaines is a 65 y.o. woman with peritoneal carcinomatosis with malignant right pleural effusion.  Final pathology is consistent with ovarian primary with  2740.    She has CKD- saw Renal  Increase proteinuria. She had a renal stent due to compression from the ovarian ca- Dr. Gottlieb    BP control has been Benicar-HCTZ, Norvasc  Digital HTN.   Atenolol 25 mg BID suggested      History:  Past Medical History:   Diagnosis Date    Anemia     Anxiety     Arthritis     knees    Cancer     ovarian    CHF (congestive heart failure)     Hx antineoplastic chemotherapy     last 6/2019    Hyperlipemia     Hypertension     Neck pain      Ovarian cancer 2019    CHEMO    Peritoneal carcinomatosis 2019     Past Surgical History:   Procedure Laterality Date    breast reduction  10/02/2018     SECTION      X 1    CYSTOSCOPY W/ URETERAL STENT PLACEMENT Right 2019    Procedure: CYSTOSCOPY, WITH URETERAL STENT INSERTION;  Surgeon: Timmy Santiago IV, MD;  Location: [facility] OR;  Service: Urology;  Laterality: Right;    CYSTOSCOPY W/ URETERAL STENT REMOVAL  10/04/2019    DILATION AND CURETTAGE OF UTERUS      HYSTERECTOMY      RALH for fibroids (still has ovaries)    INSERTION OF VENOUS ACCESS PORT Left 2019    Procedure: INSERTION, VENOUS ACCESS PORT;  Surgeon: Ulisses Monzon MD;  Location: [facility] OR;  Service: General;  Laterality: Left;  Left internal jugular     LYSIS OF ADHESIONS OF URETER N/A 8/15/2019    Procedure: URETEROLYSIS;  Surgeon: Ismael Juarez MD;  Location: [facility] OR;  Service: OB/GYN;  Laterality: N/A;    OMENTECTOMY N/A 8/15/2019    Procedure: OMENTECTOMY;  Surgeon: Ismael Juarez MD;  Location: [facility] OR;  Service: OB/GYN;  Laterality: N/A;    RETROGRADE PYELOGRAPHY Right 2019    Procedure: PYELOGRAM, RETROGRADE;  Surgeon: Timmy Santiago IV, MD;  Location: [facility] OR;  Service: Urology;  Laterality: Right;    ROBOT-ASSISTED LAPAROSCOPIC SALPINGO-OOPHORECTOMY USING DA TIA XI Bilateral 8/15/2019    Procedure: XI ROBOTIC SALPINGO-OOPHORECTOMY;  Surgeon: Ismael Juarez MD;  Location: [facility];  Service: OB/GYN;  Laterality: Bilateral;    TOTAL REDUCTION MAMMOPLASTY  2018    TUBAL LIGATION       Family History   Problem Relation Age of Onset    Anesthesia problems Other     Breast cancer Maternal Aunt     Breast cancer Paternal Aunt     Ovarian cancer Paternal Aunt     Colon cancer Brother     Thrombophilia Neg Hx      Social History     Socioeconomic History    Marital status:      Spouse name: Not on file    Number of children: Not on file    Years of education: Not on file    Highest  education level: Not on file   Occupational History    Not on file   Social Needs    Financial resource strain: Not on file    Food insecurity     Worry: Not on file     Inability: Not on file    Transportation needs     Medical: Not on file     Non-medical: Not on file   Tobacco Use    Smoking status: Former Smoker     Packs/day: 1.00     Years: 25.00     Pack years: 25.00     Quit date: 10/1/2018     Years since quittin.1    Smokeless tobacco: Never Used    Tobacco comment: States started quit 2 months ago after 30 years   Substance and Sexual Activity    Alcohol use: Never     Alcohol/week: 0.0 standard drinks     Frequency: Never     Comment: occasionally  No alcohol 72h prior to sx    Drug use: No    Sexual activity: Not Currently     Partners: Male     Comment: hyst; mut monog   Lifestyle    Physical activity     Days per week: Not on file     Minutes per session: Not on file    Stress: Only a little   Relationships    Social connections     Talks on phone: Not on file     Gets together: Not on file     Attends Rastafarian service: Not on file     Active member of club or organization: Not on file     Attends meetings of clubs or organizations: Not on file     Relationship status: Not on file   Other Topics Concern    Not on file   Social History Narrative    Live w/ spouse and 2 dogs      Patient Active Problem List   Diagnosis    Hypertension    Osteoarthritis of both knees    History of CHF (congestive heart failure)    Hyperlipidemia    Peritoneal carcinomatosis    Morbid obesity    Status post placement of ureteral stent    Abnormal ECG    Malignant neoplasm of right ovary    Malignant neoplasm of both ovaries    Pulmonary heart disease, unspecified    Ovarian cancer, bilateral    S/P BSO (bilateral salpingo-oophorectomy)    Encounter for antineoplastic chemotherapy    Anxiety    DDD (degenerative disc disease), cervical    Neck muscle weakness     Review of patient's  allergies indicates:  No Known Allergies    The following were reviewed at this visit: active problem list, medication list, allergies, family history, social history, and health maintenance.    Medications:  Current Outpatient Medications on File Prior to Visit   Medication Sig Dispense Refill    albuterol 90 mcg/actuation inhaler Inhale 2 puffs into the lungs every 4 (four) hours as needed for Wheezing. Rescue 18 g 0    ALPRAZolam (XANAX) 0.25 MG tablet Take 1 tablet (0.25 mg total) by mouth 3 (three) times daily as needed for Anxiety. 60 tablet 2    amLODIPine (NORVASC) 5 MG tablet Take 2 tablets (10 mg total) by mouth once daily. 60 tablet 11    biotin 1 mg tablet Take 1,000 mcg by mouth once daily.       cetirizine (ZYRTEC) 10 MG tablet Take 1 tablet (10 mg total) by mouth once daily. 30 tablet 5    diclofenac sodium (VOLTAREN) 1 % Gel Apply once a day to back as needed 100 g 1    docusate sodium (COLACE) 100 MG capsule Take 1 capsule (100 mg total) by mouth 2 (two) times daily as needed for Constipation. 60 capsule 1    EPINEPHrine (EPIPEN) 0.3 mg/0.3 mL AtIn Inject 0.3 mLs (0.3 mg total) into the muscle once. for 1 dose 2 each 0    fluticasone propionate (FLONASE) 50 mcg/actuation nasal spray 1 spray (50 mcg total) by Each Nostril route every 12 (twelve) hours as needed for Rhinitis. 16 g 0    gabapentin (NEURONTIN) 100 MG capsule Take 1 capsule (100 mg total) by mouth 3 (three) times daily. 90 capsule 11    ginkgo biloba 40 mg Tab Take 40 mg by mouth.      hydrOXYzine HCL (ATARAX) 25 MG tablet Take 1 tablet (25 mg total) by mouth 3 (three) times daily as needed for Itching. 90 tablet 1    methocarbamoL (ROBAXIN) 500 MG Tab Take 1 tablet (500 mg total) by mouth 3 (three) times daily as needed (muscle spasms). 60 tablet 0    multivitamin with minerals tablet Take 1 tablet by mouth once daily.       olmesartan-hydrochlorothiazide (BENICAR HCT) 40-12.5 mg Tab Take 1 tablet by mouth once daily. 90  tablet 0    polyethylene glycol (GLYCOLAX) 17 gram/dose powder Take 17 g by mouth daily as needed (constipation). 510 g 0    rosuvastatin (CRESTOR) 10 MG tablet Take 1 tablet (10 mg total) by mouth once daily. 90 tablet 1    sertraline (ZOLOFT) 25 MG tablet Take 1 tablet (25 mg total) by mouth once daily. 30 tablet 11    zinc gluconate 50 mg tablet Take 50 mg by mouth once daily.       No current facility-administered medications on file prior to visit.        Medications have been reviewed and reconciled with patient at this visit.  Barriers to medications present (no)    Adverse reactions to current medications (no)    Over the counter medications reviewed (Yes ), and if needed added to active Medication list at this visit.     Exam:  Wt Readings from Last 3 Encounters:   11/19/20 116.4 kg (256 lb 9.9 oz)   11/17/20 117 kg (257 lb 15 oz)   11/17/20 117 kg (257 lb 15 oz)     Temp Readings from Last 3 Encounters:   11/19/20 97.9 °F (36.6 °C) (Temporal)   11/17/20 97.7 °F (36.5 °C)   11/17/20 97.3 °F (36.3 °C) (Oral)     BP Readings from Last 3 Encounters:   11/19/20 124/80   11/17/20 139/82   11/17/20 (!) 145/82     Pulse Readings from Last 3 Encounters:   11/19/20 94   11/17/20 89   11/17/20 97     Body mass index is 40.19 kg/m².      Review of Systems   Constitutional: Negative.  Negative for chills and fever.   HENT: Negative.  Negative for congestion, sinus pain and sore throat.    Eyes: Negative for blurred vision and double vision.   Respiratory: Negative for cough, sputum production, shortness of breath and wheezing.    Cardiovascular: Negative for chest pain, palpitations and leg swelling.   Gastrointestinal: Negative for abdominal pain, constipation, diarrhea, heartburn, nausea and vomiting.   Genitourinary: Negative.    Musculoskeletal: Positive for back pain and joint pain.   Skin: Negative.  Negative for rash.   Neurological: Negative.    Endo/Heme/Allergies: Negative.  Negative for polydipsia. Does  not bruise/bleed easily.   Psychiatric/Behavioral: Negative for depression and substance abuse.     Physical Exam  Vitals signs and nursing note reviewed.   Constitutional:       General: She is not in acute distress.     Appearance: She is well-developed. She is not diaphoretic.   HENT:      Head: Normocephalic and atraumatic.      Right Ear: External ear normal.      Left Ear: External ear normal.      Nose: Nose normal.      Mouth/Throat:      Pharynx: No oropharyngeal exudate.   Eyes:      General:         Right eye: No discharge.         Left eye: No discharge.      Conjunctiva/sclera: Conjunctivae normal.      Pupils: Pupils are equal, round, and reactive to light.   Neck:      Musculoskeletal: Normal range of motion and neck supple.      Thyroid: No thyromegaly.   Cardiovascular:      Rate and Rhythm: Normal rate and regular rhythm.      Heart sounds: Normal heart sounds. No murmur.   Pulmonary:      Effort: Pulmonary effort is normal. No respiratory distress.      Breath sounds: Normal breath sounds. No wheezing.   Musculoskeletal: Normal range of motion.         General: Tenderness present.      Right wrist: She exhibits tenderness.   Lymphadenopathy:      Cervical: No cervical adenopathy.   Skin:     Capillary Refill: Capillary refill takes less than 2 seconds.   Neurological:      Mental Status: She is alert and oriented to person, place, and time.      Cranial Nerves: No cranial nerve deficit.   Psychiatric:         Behavior: Behavior normal.         Thought Content: Thought content normal.         Judgment: Judgment normal.         Laboratory Reviewed ({N/A)  Lab Results   Component Value Date    WBC 7.76 11/17/2020    HGB 12.5 11/17/2020    HCT 41.3 11/17/2020     11/17/2020    CHOL 171 02/09/2018    TRIG 40 02/09/2018    HDL 66 02/09/2018    ALT 21 11/17/2020    AST 22 11/17/2020     11/17/2020    K 4.1 11/17/2020     11/17/2020    CREATININE 0.9 11/17/2020    BUN 13 11/17/2020     CO2 27 11/17/2020    TSH 1.869 04/19/2013    INR 1.0 01/28/2019       Casie was seen today for referral.    Diagnoses and all orders for this visit:    Hypertension, unspecified type  -     atenoloL (TENORMIN) 25 MG tablet; Take 1 tablet (25 mg total) by mouth once daily.    Ovarian cancer, bilateral    Bilateral wrist pain  -     Ambulatory referral/consult to Physical/Occupational Therapy; Future        BP reviewed  OT         Care Plan/Goals: Reviewed  (N/A)  Goals        Patient Stated     Blood Pressure < 130/80 (pt-stated)        Other     Exercise at least 150 minutes per week.      Maintain a low sodium diet      American Heart Association (AHA) guidelines recommend less than 1500 mg of dietary salt per day.       Take at least one BP reading per week at various times of the day           Follow up: No follow-ups on file.    After visit summary was printed and given to patient upon discharge today.  Patient goals and care plan are included in After Visit Summary.

## 2020-11-22 ENCOUNTER — PATIENT OUTREACH (OUTPATIENT)
Dept: ADMINISTRATIVE | Facility: OTHER | Age: 65
End: 2020-11-22

## 2020-11-24 ENCOUNTER — OFFICE VISIT (OUTPATIENT)
Dept: GASTROENTEROLOGY | Facility: CLINIC | Age: 65
End: 2020-11-24
Payer: MEDICARE

## 2020-11-24 ENCOUNTER — TELEPHONE (OUTPATIENT)
Dept: GASTROENTEROLOGY | Facility: CLINIC | Age: 65
End: 2020-11-24

## 2020-11-24 VITALS
HEART RATE: 76 BPM | SYSTOLIC BLOOD PRESSURE: 140 MMHG | OXYGEN SATURATION: 98 % | HEIGHT: 67 IN | BODY MASS INDEX: 40.66 KG/M2 | WEIGHT: 259.06 LBS | DIASTOLIC BLOOD PRESSURE: 82 MMHG

## 2020-11-24 DIAGNOSIS — Z12.11 COLON CANCER SCREENING: ICD-10-CM

## 2020-11-24 DIAGNOSIS — R14.0 ABDOMINAL BLOATING: ICD-10-CM

## 2020-11-24 DIAGNOSIS — Z80.0 FAMILY HISTORY OF COLON CANCER: ICD-10-CM

## 2020-11-24 DIAGNOSIS — K92.1 MELENA: Primary | ICD-10-CM

## 2020-11-24 PROCEDURE — 99999 PR PBB SHADOW E&M-EST. PATIENT-LVL V: CPT | Mod: PBBFAC,,, | Performed by: NURSE PRACTITIONER

## 2020-11-24 PROCEDURE — 99204 PR OFFICE/OUTPT VISIT, NEW, LEVL IV, 45-59 MIN: ICD-10-PCS | Mod: S$PBB,,, | Performed by: NURSE PRACTITIONER

## 2020-11-24 PROCEDURE — 99215 OFFICE O/P EST HI 40 MIN: CPT | Mod: PBBFAC | Performed by: NURSE PRACTITIONER

## 2020-11-24 PROCEDURE — 99999 PR PBB SHADOW E&M-EST. PATIENT-LVL V: ICD-10-PCS | Mod: PBBFAC,,, | Performed by: NURSE PRACTITIONER

## 2020-11-24 PROCEDURE — 99204 OFFICE O/P NEW MOD 45 MIN: CPT | Mod: S$PBB,,, | Performed by: NURSE PRACTITIONER

## 2020-11-24 RX ORDER — SODIUM, POTASSIUM,MAG SULFATES 17.5-3.13G
SOLUTION, RECONSTITUTED, ORAL ORAL
Qty: 354 ML | Refills: 0 | Status: SHIPPED | OUTPATIENT
Start: 2020-11-24 | End: 2021-12-22

## 2020-11-24 NOTE — PATIENT INSTRUCTIONS
Stool softeners daily and Pepcid 20 mg twice a day as needed.   Notify me if continues with black stools.

## 2020-11-24 NOTE — PROGRESS NOTES
Clinic Consult:  [facility] Consultation Note    Reason for Consult:  The primary encounter diagnosis was Melena. Diagnoses of Abdominal bloating, Colon cancer screening, and Family history of colon cancer were also pertinent to this visit.    PCP: Rossana Ang   [address] [facility] / [place] [address]    HPI:  This is a 65 y.o. female here for evaluation of Melena.   Onset of symptoms started a few weeks ago. She takes pepto-bismol occasionally for upset stomach and noticed stools were black after taking it. She admits to stool lightening up in color and then became dark again after taking another dose of pepto-bismol.   She is also due for screening colonoscopy. She had a family history of colon cancer in her brother. Per care everywhere, her last colonoscopy was done in . No polyps.     Review of Systems   Constitutional: Negative for fever, malaise/fatigue and weight loss.   HENT: Negative for sore throat.    Respiratory: Negative for cough and wheezing.    Cardiovascular: Negative for chest pain and palpitations.   Gastrointestinal: Positive for abdominal pain and constipation. Negative for blood in stool, diarrhea, heartburn, melena, nausea and vomiting.   Genitourinary: Negative for dysuria and frequency.   Musculoskeletal: Negative for back pain, joint pain, myalgias and neck pain.   Skin: Negative for itching and rash.   Neurological: Negative for dizziness, speech change, seizures, loss of consciousness and headaches.   Psychiatric/Behavioral: Negative for depression and substance abuse. The patient is not nervous/anxious.        Medical History:  has a past medical history of Anemia, Anxiety, Arthritis, Cancer, CHF (congestive heart failure), antineoplastic chemotherapy, Hyperlipemia, Hypertension, Neck pain, Ovarian cancer (), and Peritoneal carcinomatosis (2019).    Surgical History:  has a past surgical history that includes  section; Dilation and curettage  of uterus; Hysterectomy; Tubal ligation; breast reduction (10/02/2018); Cystoscopy w/ ureteral stent placement (Right, 1/30/2019); Retrograde pyelography (Right, 1/30/2019); Insertion of venous access port (Left, 2/18/2019); Total Reduction Mammoplasty (2018); Robot-assisted laparoscopic salpingo-oophorectomy using da Laisha Xi (Bilateral, 8/15/2019); Omentectomy (N/A, 8/15/2019); Lysis of adhesions of ureter (N/A, 8/15/2019); and Cystoscopy w/ ureteral stent removal (10/04/2019).    Family History: family history includes Anesthesia problems in her other; Breast cancer in her maternal aunt and paternal aunt; Colon cancer in her brother; Ovarian cancer in her paternal aunt..     Social History:  reports that she quit smoking about 2 years ago. She has a 25.00 pack-year smoking history. She has never used smokeless tobacco. She reports that she does not drink alcohol or use drugs.    Allergies: Reviewed    Home Medications:   Current Outpatient Medications on File Prior to Visit   Medication Sig Dispense Refill    ALPRAZolam (XANAX) 0.25 MG tablet Take 1 tablet (0.25 mg total) by mouth 3 (three) times daily as needed for Anxiety. 60 tablet 2    amLODIPine (NORVASC) 5 MG tablet Take 2 tablets (10 mg total) by mouth once daily. 60 tablet 11    atenoloL (TENORMIN) 25 MG tablet Take 1 tablet (25 mg total) by mouth once daily. 30 tablet 11    biotin 1 mg tablet Take 1,000 mcg by mouth once daily.       cetirizine (ZYRTEC) 10 MG tablet Take 1 tablet (10 mg total) by mouth once daily. 30 tablet 5    diclofenac sodium (VOLTAREN) 1 % Gel Apply once a day to back as needed 100 g 1    docusate sodium (COLACE) 100 MG capsule Take 1 capsule (100 mg total) by mouth 2 (two) times daily as needed for Constipation. 60 capsule 1    fluticasone propionate (FLONASE) 50 mcg/actuation nasal spray 1 spray (50 mcg total) by Each Nostril route every 12 (twelve) hours as needed for Rhinitis. 16 g 0    gabapentin (NEURONTIN) 100 MG  "capsule Take 1 capsule (100 mg total) by mouth 3 (three) times daily. 90 capsule 11    ginkgo biloba 40 mg Tab Take 40 mg by mouth.      hydrOXYzine HCL (ATARAX) 25 MG tablet Take 1 tablet (25 mg total) by mouth 3 (three) times daily as needed for Itching. 90 tablet 1    methocarbamoL (ROBAXIN) 500 MG Tab Take 1 tablet (500 mg total) by mouth 3 (three) times daily as needed (muscle spasms). 60 tablet 0    multivitamin with minerals tablet Take 1 tablet by mouth once daily.       olmesartan-hydrochlorothiazide (BENICAR HCT) 40-12.5 mg Tab Take 1 tablet by mouth once daily. 90 tablet 0    polyethylene glycol (GLYCOLAX) 17 gram/dose powder Take 17 g by mouth daily as needed (constipation). 510 g 0    rosuvastatin (CRESTOR) 10 MG tablet Take 1 tablet (10 mg total) by mouth once daily. 90 tablet 1    zinc gluconate 50 mg tablet Take 50 mg by mouth once daily.      albuterol 90 mcg/actuation inhaler Inhale 2 puffs into the lungs every 4 (four) hours as needed for Wheezing. Rescue 18 g 0    EPINEPHrine (EPIPEN) 0.3 mg/0.3 mL AtIn Inject 0.3 mLs (0.3 mg total) into the muscle once. for 1 dose 2 each 0    sertraline (ZOLOFT) 25 MG tablet Take 1 tablet (25 mg total) by mouth once daily. 30 tablet 11     No current facility-administered medications on file prior to visit.        Physical Exam:  BP (!) 140/82   Pulse 76   Ht 5' 7" (1.702 m)   Wt 117.5 kg (259 lb 0.7 oz)   SpO2 98%   BMI 40.57 kg/m²   Body mass index is 40.57 kg/m².  Physical Exam   Constitutional: She is oriented to person, place, and time and well-developed, well-nourished, and in no distress. No distress.   HENT:   Head: Normocephalic.   Eyes: Pupils are equal, round, and reactive to light. Conjunctivae are normal.   Cardiovascular: Normal rate, regular rhythm and normal heart sounds.   Pulmonary/Chest: Effort normal and breath sounds normal. No respiratory distress.   Abdominal: Soft. Bowel sounds are normal. She exhibits no distension. There " is no abdominal tenderness.   Neurological: She is alert and oriented to person, place, and time. No cranial nerve deficit.   Skin: Skin is warm and dry. No rash noted.   Psychiatric: Mood and affect normal.       Labs: Pertinent labs reviewed.  CRC Screening: due     Assessment:  1. Melena    2. Abdominal bloating    3. Colon cancer screening    4. Family history of colon cancer      Not true melena. Black stools likely secondary to pepto-bismol use  Due for screening colonoscopy.   Use stool softeners for constipation     Recommendations:   - needs colonoscopy for screening  - not true melena-- will notify me if black stools occur without pepto-bismol.     Melena  -     Ambulatory referral/consult to Gastroenterology    Abdominal bloating    Colon cancer screening  -     Case Request Endoscopy: COLONOSCOPY  -     SUPREP BOWEL PREP KIT 17.5-3.13-1.6 gram SolR; Use as directed  Dispense: 354 mL; Refill: 0  -     COVID-19 Routine Screening; Future; Expected date: 11/24/2020    Family history of colon cancer  -     Case Request Endoscopy: COLONOSCOPY  -     SUPREP BOWEL PREP KIT 17.5-3.13-1.6 gram SolR; Use as directed  Dispense: 354 mL; Refill: 0  -     COVID-19 Routine Screening; Future; Expected date: 11/24/2020        Follow up if symptoms worsen or fail to improve.    Thank you so much for allowing me to participate in the care of JAYDEN Sheth

## 2020-11-24 NOTE — LETTER
November 24, 2020      Oscar Mckeon MD  [address]  [place] [address]           [facility]  [address] [facility] [address]  Phone: [phone]  Fax: [phone]          Patient: Casie Gaines   MR Number: [id number]   YOB: 1955   Date of Visit: 11/24/2020       Dear Dr. Oscar Mckeon:    Thank you for referring Casie Gaines to me for evaluation. Attached you will find relevant portions of my assessment and plan of care.    If you have questions, please do not hesitate to call me. I look forward to following Casie Gaines along with you.    Sincerely,    Cande Ravi, NP    Enclosure  CC:  No Recipients    If you would like to receive this communication electronically, please contact [email or web address] or [phone] to request more information on [facility] access.    For providers and/or their staff who would like to refer a patient to [facility], please contact us through our one-stop-shop provider referral line, [employer] , at [phone].    If you feel you have received this communication in error or would no longer like to receive these types of communications, please e-mail [email or web address]

## 2020-11-24 NOTE — TELEPHONE ENCOUNTER
"  ENDO screening    1. Have you been admitted for cardiac, kidney or pulmonary causes to the hospital in the past 3 months? no   If yes, schedule an appointment with PCP before scheduling endoscopic procedure.     2. Have you had a stent placed in the last 12 months? no   If yes, for a screening visit, cancel and message the ordering provider.  The patient will need a new order when the time is appropriate.     3. Have you had a stroke or heart attack in the past 6 months? no   If yes, cancel and refer patient to ordering provider for clearance, also message ordering provider to inform.     4. Have you had any chest pain in the past 3 months? no   If yes, Have you been evaluated by your PCP and/or cardiologist and it was determined to not be heart related? not applicable   If No, Pt needs to be seen by PCP or Cardiologist .  Pt can be scheduled once clearance obtained by either of those providers.     5. Do you take prescription weight loss medications?  no   If yes, must stop for 2 weeks prior to procedure.     6. Have you been diagnosed with diverticulitis within the past 3 months? no   If yes, must have been seen by GI within the last 3 months, if not schedule with GI [facility].    If pt has been seen by GI, schedule procedure 8-12 weeks post antibiotic treatment.     7. Are you on Dialysis? no  If yes, schedule procedure for the day AFTER dialysis.  Appt time should be 9am or later, patient arrival time is 2 hours prior.  Nulytely or miralax prep for all patients with kidney disease.     8. Are you diabetic?  no   If yes, schedule morning appt. Advise pt to hold all diabetic meds day of procedure.     9. If pt is older than 80 years of age and HAS NOT been seen by GI or PCP within the last 6 months, needs appt with GI [facility].   If pt has been seen by the GI provider or PCP within the past 6  months AND meets criteria, schedule procedure AND send message to the endoscopist.     10. Is patient on a "high risk" medication " (blood thinner/antiplatelet agent)?  no   If yes, has cardiac clearance been obtained within the last 60 days? N/A   If no, a new clearance needs to be obtained.     Final Questions:    1.I have reviewed the last colonoscopy for recommendations regarding next procedure bowel prep.  yes  2. I have reviewed medications and allergies.  yes  3. I have verified the pharmacy information and appropriate prep sent if needed. yes  4. Prep instructions have been mailed or sent to portal per patient request. yes        All schedulers will have ability to reach out to the ordering GI provider to clarify any issues.

## 2020-11-30 ENCOUNTER — LAB VISIT (OUTPATIENT)
Dept: LAB | Facility: OTHER | Age: 65
End: 2020-11-30
Attending: FAMILY MEDICINE
Payer: MEDICARE

## 2020-11-30 ENCOUNTER — OFFICE VISIT (OUTPATIENT)
Dept: GYNECOLOGIC ONCOLOGY | Facility: CLINIC | Age: 65
End: 2020-11-30
Payer: MEDICARE

## 2020-11-30 VITALS
WEIGHT: 262.13 LBS | DIASTOLIC BLOOD PRESSURE: 64 MMHG | HEART RATE: 73 BPM | BODY MASS INDEX: 41.05 KG/M2 | SYSTOLIC BLOOD PRESSURE: 141 MMHG

## 2020-11-30 DIAGNOSIS — C56.3 MALIGNANT NEOPLASM OF BOTH OVARIES: Primary | ICD-10-CM

## 2020-11-30 DIAGNOSIS — C78.6 PERITONEAL CARCINOMATOSIS: ICD-10-CM

## 2020-11-30 DIAGNOSIS — C56.3 MALIGNANT NEOPLASM OF BOTH OVARIES: ICD-10-CM

## 2020-11-30 DIAGNOSIS — Z51.11 ENCOUNTER FOR ANTINEOPLASTIC CHEMOTHERAPY: ICD-10-CM

## 2020-11-30 DIAGNOSIS — I10 ESSENTIAL HYPERTENSION: ICD-10-CM

## 2020-11-30 DIAGNOSIS — E78.2 MIXED HYPERLIPIDEMIA: ICD-10-CM

## 2020-11-30 DIAGNOSIS — E66.01 MORBID OBESITY: ICD-10-CM

## 2020-11-30 LAB — CANCER AG125 SERPL-ACNC: 7 U/ML (ref 0–30)

## 2020-11-30 PROCEDURE — 99999 PR PBB SHADOW E&M-EST. PATIENT-LVL III: CPT | Mod: PBBFAC,,, | Performed by: OBSTETRICS & GYNECOLOGY

## 2020-11-30 PROCEDURE — 36415 COLL VENOUS BLD VENIPUNCTURE: CPT

## 2020-11-30 PROCEDURE — 86304 IMMUNOASSAY TUMOR CA 125: CPT

## 2020-11-30 PROCEDURE — 99213 OFFICE O/P EST LOW 20 MIN: CPT | Mod: PBBFAC | Performed by: OBSTETRICS & GYNECOLOGY

## 2020-11-30 PROCEDURE — 99999 PR PBB SHADOW E&M-EST. PATIENT-LVL III: ICD-10-PCS | Mod: PBBFAC,,, | Performed by: OBSTETRICS & GYNECOLOGY

## 2020-11-30 PROCEDURE — 99214 OFFICE O/P EST MOD 30 MIN: CPT | Mod: S$PBB,,, | Performed by: OBSTETRICS & GYNECOLOGY

## 2020-11-30 PROCEDURE — 99214 PR OFFICE/OUTPT VISIT, EST, LEVL IV, 30-39 MIN: ICD-10-PCS | Mod: S$PBB,,, | Performed by: OBSTETRICS & GYNECOLOGY

## 2020-11-30 NOTE — PROGRESS NOTES
Subjective:      Patient ID: Casie Gaines is a 65 y.o. female.    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 Mckeon is leaving and she needs a treatment change after being on Avastin maintenance and now having nephrotic range persistent proteinuria.  Had CT on 9/24/2020 that was negative.  Review of Systems   Constitutional: Negative for activity change, appetite change, chills, fatigue and fever.   HENT: Negative for hearing loss, mouth sores, nosebleeds, sore throat and tinnitus.    Eyes: Negative for visual disturbance.   Respiratory: Negative for cough, chest tightness, shortness of breath and wheezing.    Cardiovascular: Negative for chest pain and leg swelling.   Gastrointestinal: Negative for abdominal distention, abdominal pain, blood in stool, constipation, diarrhea, nausea and vomiting.   Genitourinary: Negative for dysuria, flank pain, frequency, hematuria, pelvic pain, vaginal bleeding, vaginal discharge and vaginal pain.   Musculoskeletal: Negative for arthralgias and back pain.   Skin: Negative for rash.   Neurological: Negative for dizziness, seizures, syncope, weakness and numbness.   Hematological: Does not bruise/bleed easily.   Psychiatric/Behavioral: Negative for confusion and sleep disturbance. The patient is not nervous/anxious.        Objective:   Physical Exam:   Constitutional: She appears well-developed and well-nourished. No distress.    HENT:   Head: Normocephalic and atraumatic.    Eyes: No scleral icterus.    Neck: Normal range of motion.  Neck supple.    Cardiovascular: Normal rate and intact distal pulses.  Exam reveals no cyanosis and no edema.     Pulmonary/Chest: Effort normal. No respiratory distress. She exhibits no tenderness.        Abdominal: Soft. She exhibits no distension (incisions healing well), no fluid wave and no mass. There is no abdominal tenderness. There is no rebound and no guarding. No hernia.         Genitourinary:    Vagina and rectum normal.      Pelvic exam was performed with patient supine.   There is no rash, tenderness or lesion on the right labia. There is no rash, tenderness or lesion on the left labia. Uterus is absent. Right adnexum displays no mass, no tenderness and no fullness. Left adnexum displays no mass, no tenderness and no fullness. No bleeding or unspecified prolapse of vaginal walls in the vagina. Vaginal cuff normal.Labial bartholins normal.Cervix exhibits absence. negative for vaginal discharge             Lymphadenopathy:     She has no cervical adenopathy. No inguinal adenopathy noted on the right or left side.     Skin: No cyanosis.        Assessment:     1. Malignant neoplasm of both ovaries    2. Peritoneal carcinomatosis    3. Encounter for antineoplastic chemotherapy    4. Essential hypertension    5. Mixed hyperlipidemia    6. Morbid obesity        Plan:       Patient is doing well and is CLYDE on exam and by fairly recent scan.  Agree with stopping the Avastin given proteinuria and arthralgias.  Will get CA-125 today and move to 2 month observation.  Would prefer to save PARPi for recurrence.  She is aware and voiced understanding and is in agreement with the plan.  RTC 2 months

## 2020-12-01 ENCOUNTER — TELEPHONE (OUTPATIENT)
Dept: GYNECOLOGIC ONCOLOGY | Facility: CLINIC | Age: 65
End: 2020-12-01

## 2020-12-01 NOTE — TELEPHONE ENCOUNTER
----- Message from Ismael Juarez MD sent at 12/1/2020  6:35 AM CST -----  CA-125 normal, please let patient know

## 2020-12-03 ENCOUNTER — CLINICAL SUPPORT (OUTPATIENT)
Dept: REHABILITATION | Facility: HOSPITAL | Age: 65
End: 2020-12-03
Attending: PHYSICIAN ASSISTANT
Payer: MEDICARE

## 2020-12-03 DIAGNOSIS — M25.531 BILATERAL WRIST PAIN: ICD-10-CM

## 2020-12-03 DIAGNOSIS — M25.532 BILATERAL WRIST PAIN: ICD-10-CM

## 2020-12-03 DIAGNOSIS — M25.631 DECREASED RANGE OF MOTION OF RIGHT WRIST: Primary | ICD-10-CM

## 2020-12-03 DIAGNOSIS — Z78.9 DECREASED ACTIVITIES OF DAILY LIVING (ADL): ICD-10-CM

## 2020-12-03 DIAGNOSIS — C56.9 MALIGNANT NEOPLASM OF OVARY, UNSPECIFIED LATERALITY: ICD-10-CM

## 2020-12-03 PROCEDURE — 97165 OT EVAL LOW COMPLEX 30 MIN: CPT

## 2020-12-03 PROCEDURE — 97110 THERAPEUTIC EXERCISES: CPT

## 2020-12-03 RX ORDER — ROSUVASTATIN CALCIUM 10 MG/1
10 TABLET, COATED ORAL DAILY
Qty: 90 TABLET | Refills: 1 | Status: SHIPPED | OUTPATIENT
Start: 2020-12-03 | End: 2021-02-11 | Stop reason: SDDI

## 2020-12-03 NOTE — PLAN OF CARE
Initial OT evaluation completed this date. Please see initial POC in treatment tab.     [facility] Occupational Therapy  Initial Evaluation     Date: 12/3/2020  Patient: Casie Gaines  Chart Number: [id number]    Therapy Diagnosis:  B wrist pain, decreased rom, decreased ADL  Physician: Rossana Ang MD    Physician Orders: Eval and treat  Medical Diagnosis: M25.531,M25.532 (ICD-10-CM) - Bilateral wrist pain  Evaluation Date: 12/3/2020  Insurance Authorization period Expiration: 11/19/21  Plan of Care Expiration Period: 1/28/21    Visit # / Visits Authortized: 1 / 1  Time In:1:30 pm  Time Out: 2:05 pm  Total Billable Time: 35 minutes    Precautions: Standard and CHF and cancer    S/P: chronic    Subjective     Involved Side: B hands  Dominant Side: Right  Date of Onset: approx 9/2020  Mechanism of Injury/ History of Current Condition: Pt requested OT for hand and wrist pain B. Pt states today that she was told on 11/30/2020 (after therapy was ordered)  that the pain is a side effect of medications. The doctor told her to stop all medications for the current time. The pt is not sure if she should attend therapy or not.  Pt also states she takes meds for paresthesias.  Surgical Procedure: n/a   Imaging: X rays:  None  Previous Therapy: none for hands; pain in Shoulders was treated by PT    Patient's Goals for Therapy: relieve pain     Pain:  Functional Pain Scale Rating 0-10:   2/10 now   2/10 at best  9/10 at worst  Location: L radial forearm and R palm/wrist  Description: Numb  Aggravating Factors: Lifting  Easing Factors: Epsom soaks, Tylenol    Occupation:  Not working      Functional Limitations/Social History:    Previous functional status includes: Independent with all ADLs.     Current FunctionalStatus   Home/Living environment : lives with their family      Limitation of Functional Status as follows:   ADLs/IADLs: Difficulty with opening jars, stirring when cooking, housework    -  Feeding: Difficulty cutting food    - Bathing: Difficulty washing back    - Dressing/Grooming: Difficulty buttoning    - Driving: turn wheel on R; used left     Leisure: pickle ball-unable to swing      Past Medical History/Physical Systems Review:   Casie Gaines  has a past medical history of Anemia, Anxiety, Arthritis, Cancer, CHF (congestive heart failure), antineoplastic chemotherapy, Hyperlipemia, Hypertension, Neck pain, Ovarian cancer, and Peritoneal carcinomatosis.    Casie Gaines  has a past surgical history that includes  section; Dilation and curettage of uterus; Hysterectomy; Tubal ligation; breast reduction (10/02/2018); Cystoscopy w/ ureteral stent placement (Right, 2019); Retrograde pyelography (Right, 2019); Insertion of venous access port (Left, 2019); Total Reduction Mammoplasty (); Robot-assisted laparoscopic salpingo-oophorectomy using da Laisha Xi (Bilateral, 8/15/2019); Omentectomy (N/A, 8/15/2019); Lysis of adhesions of ureter (N/A, 8/15/2019); and Cystoscopy w/ ureteral stent removal (10/04/2019).    Casie has a current medication list which includes the following prescription(s): albuterol, alprazolam, amlodipine, atenolol, biotin, cetirizine, diclofenac sodium, docusate sodium, epinephrine, fluticasone propionate, gabapentin, ginkgo biloba, hydroxyzine hcl, methocarbamol, multivitamin with minerals, olmesartan-hydrochlorothiazide, polyethylene glycol, rosuvastatin, sertraline, suprep bowel prep kit, and zinc gluconate.    Review of patient's allergies indicates:  No Known Allergies       Objective     Observation/Inspection:  No abnormalities observed.     Sensation:  Intact to light touch.  Paresthesias reported dorsal FA and hand into shoulder B.           Edema:   Decreased volar wrist crease visibility on R          (in cm) L R   Proximal Wrist Crease 18.8 18.8   MPs     Thumb  Proximal Phalanx 6.9 7.3         Range of Motion:  Pt exhibits full  finger flexion, extension. Mild decrease in hook position on R, opposition to SF B    FA rotation  R 60/65     L 75/90  Wrist E/F   R 71/34   L 60/55                        Manual Muscle Test:  Not tested                                       and Pinch Strength: not tested         Special Tests: mild tightness in 1st DC, R common extensors and flexors,         CMS Impairment/Limitation/Restriction for Quick DASH Survey    Therapist reviewed Quick DASH scores for Casie Gaines on 12/3/2020.   Quick DASH documents entered into [employer] - see Media section.    The Quick DASH Questionnaire- The following scores are based on patient reported assessment at the time of the initial occupational therapy evaluation:    Activity:  1. Open a tight jar: moderate Difficulty  2. Do heavy household chores: moderate Difficulty  3. Carry a shopping bag or briefcase: mild Difficulty  4. Wash your back: severe Difficulty  5.Use a knife to cut food:mild Difficulty  6.. Recreational activities requiring force through arm: moderate Difficulty    7. Social Limitation: moderately Limited  8. Work/ADL Limitation: moderately  Limited    Severity of Symptoms (over the past week)  9. Pain: moderate  10. Tingling: none    11. Sleeping Limitation: moderate Difficulty    Limitation Score: 45%         Treatment     Treatment Time In: 1:45 pm  Treatment Time Out: 2:05 pm  Total Treatment time separate from Evaluation time:20 min    Casie received the following manual therapy techniques for 2 minutes:   -Retrograde massage to R digits/hand/wrist x 2 min to stimulate lymphatics to decrease pain,  edema and increase AROM and functional use.       Casie received therapeutic exercises for 18 minutes including:  -gentle extrinsic stretches B UEs (1st DC, extrinsic flexors and extensors)  -AROM: FA rotation, wrist E/F, RD/UD, ave, hook, fist, straight fist, spreads, lifts, composite thumb flexion    Home Exercise Program/Education:  Issued HEP (see  patient instructions in EMR) and educated on modality use for pain management . Exercises were reviewed and Casie was able to demonstrate them prior to the end of the session.   Pt received a written copy of exercises to perform at home. Casie demonstrated good  understanding of the education provided.  Pt was advised to perform these exercises free of pain, and to stop performing them if pain occurs.    Patient/Family Education: role of OT, goals for OT, scheduling/cancellations - pt verbalized understanding. Discussed insurance limitations with patient.      Assessment     Casie Gaines is a 65 y.o. female referred to outpatient occupational/hand therapy and presents with a medical diagnosis of B wrist pain, resulting in Paresthesias, pain, edema, decreased A/PROM, strength, and functional use of B U's and demonstrates limitations as described in the chart below.     The patient's rehab potential is Good.     Anticipated barriers to occupational therapy: Recently told symptoms are a side effect of Avastin medication  Pt has no cultural, educational or language barriers to learning provided.    Profile and History Assessment of Occupational Performance Level of Clinical Decision Making Complexity Score   Occupational Profile:   Casie Gaines is a 65 y.o. female who lives with their family and is currently not working. Casie Gaines has difficulty with  feeding, bathing and dressing  driving/transportation management, shopping and housework/household chores  affecting his/her daily functional abilities. His/her main goal for therapy is relieve pain.     Comorbidities:   anxiety, CHF, history of cancer and HTN    Medical and Therapy History Review:   Expanded               Performance Deficits    Physical:  Joint Mobility  Muscle Power/Strength  Muscle Endurance   Strength  Pinch Strength  Fine Motor Coordination  Pain  paresthesias    Cognitive:  No Deficits    Psychosocial:    No Deficits      Clinical Decision Making:  low    Assessment Process:  Detailed Assessments    Modification/Need for Assistance:  Minimal-Moderate Modifications/Assistance    Intervention Selection:  Limited Treatment Options       low  Based on PMHX, co morbidities , data from assessments and functional level of assistance required with task and clinical presentation directly impacting function.       The following goals were discussed with the patient and patient is in agreement with them as to be addressed in the treatment plan.     Goals:   Short Term Goals: (1 visit)  1. Pt will be independent with HEP in 1 visits    Long Term Goals: ( if patient requires additional therapy)   1. Pt will report decreased pain to 1-2/10 with ADLs.   2. Pt will  Increase R  wrist/forearm AROM by 10-15  degrees to enable dressing, grooming activities.  3. Pt will exhibit 40-50# B  strength to allow a firm grasp on cooking utensils, steering wheel, etc.  4. Pt will exhibit 9# of functional pinch strength to allow writing,opening containers, and turning keys.  5. Pt will exhibit a significant increase (20-30 points) in the Quick DASH assessment.  6. Pt will return to PLOF.     Plan   Certification Period/Plan of care expiration: 12/3/2020 to 1/28/21.    Outpatient Occupational Therapy will be place on hold for pt to perform HEP. Pt to schedule additional visits if symptoms do not improve as time off of medication increases. Will leave chart open x 8 weeks. If necessary pt will attend sessions 2x/week until POC expiration.      If treatment is required beyond HEP issued at [facility], it may consist of Modalities to decrease pain (moist heat, paraffin, fluidotherapy, US ), edema control,  manual therapy/joint mobilizations,A/PROM, therapeutic exercises/activities, strengthening, desensitization/sensory re-education, orthotic fitting/fabrication/training PRN, joint protections/energry conservation/adaptive equipment/activity modification  HEP/education  as well as any other treatments deemed necessary based on the patient's needs or progress.        JACKSON Donaldson, CARLOST

## 2020-12-16 ENCOUNTER — PATIENT OUTREACH (OUTPATIENT)
Dept: OTHER | Facility: OTHER | Age: 65
End: 2020-12-16

## 2020-12-16 NOTE — PROGRESS NOTES
Digital Medicine: Clinician Follow-Up    Patient indicates having two headaches that were mild and indicates she is planning on talking with provider at next visit. Declines further discussion today.    The history is provided by the patient.     Hypertension    Readings are trending down         Last 5 Patient Entered Readings                                      Current 30 Day Average: 140/83     Recent Readings 11/28/2020 11/28/2020 11/27/2020 11/27/2020 11/10/2020    SBP (mmHg) 131 133 149 157 145    DBP (mmHg) 67 82 90 91 92    Pulse 69 71 83 80 92                 Depression Screening  Did not address depression screening.    Sleep Apnea Screening    Did not address sleep apnea screening.     Medication Affordability Screening  Did not address medication affordability screening.     Medication Adherence-Medication Adherence not addressed.          ASSESSMENT(S)  Patients BP average is 140/83 mmHg, which is above goal. Patient's BP goal is less than or equal to 130/80.     Hypertension Plan  Continue current therapy.       Addressed patient questions and patient has my contact information if needed prior to next outreach. Patient verbalizes understanding.             There are no preventive care reminders to display for this patient.  There are no preventive care reminders to display for this patient.      Hypertension Medications             amLODIPine (NORVASC) 5 MG tablet Take 2 tablets (10 mg total) by mouth once daily.    atenoloL (TENORMIN) 25 MG tablet Take 1 tablet (25 mg total) by mouth once daily.    olmesartan-hydrochlorothiazide (BENICAR HCT) 40-12.5 mg Tab Take 1 tablet by mouth once daily.

## 2020-12-16 NOTE — PROGRESS NOTES
Follow-up Call  - [facility] to call back.  - Avg BP: 140/83 mmHg  - Calling in regards to a TASK set by health . Pt having increased headaches. Assess and see if possibly related to BP. Have pt get BP readings more frequently.    Hypertension Medications             amLODIPine (NORVASC) 5 MG tablet Take 2 tablets (10 mg total) by mouth once daily.    atenoloL (TENORMIN) 25 MG tablet Take 1 tablet (25 mg total) by mouth once daily.    olmesartan-hydrochlorothiazide (BENICAR HCT) 40-12.5 mg Tab Take 1 tablet by mouth once daily.

## 2020-12-16 NOTE — PROGRESS NOTES
Digital Medicine: Health  Follow-Up    The history is provided by the patient.             Reason for review: Blood pressure not at goal        Topics Covered on Call: physical activity, Diet and device use    Additional Follow-up details: Patient returned call to let me know that she is doing well. Patient AVG BP is above goal but readings are trending down. Patient reports that her BP goes through cycles and while she is receiving cancer treatments she has to take a medication that increases BP. Patient reports that he doctors stated that her body needs a rest and so she is off the cancer treatment for now as well as the medication that increases BP.  Patient and I briefly discussed diet and activity levels to know where patient is at and the types of things we can work on going forward. Patient reports that she has recently been having headaches and she is not sure why. Will mention this to Gwendolyn Lopez. Will f/u in 4-6 weeks.             Diet-Change  24 hour dietary recall  Breakfast is typically between. Toast, smoked sausage, coffee.  Lunch is typically between. Skipped.   Dinner is typically between. Tuna casserole.   Patient reports eating or drinking the following: Patient and I briefly discussed recommended sodium intake and how it can impact BP. Patient reports that her diet isn't the best but she does try to make smart choices. Patient prepares food without salt and tells her family that they can season their food how they see fit. Patient reports that she drinks 16-24 oz of water per day. Patient drinks coffee sometimes, tea, or juice.       Physical Activity-Change      Additional physical activity details: Patient and I discussed how physical activity can affect BP. Patient reports that she does cook, clean, chores around the house, and chases around a 9 month old every day. Patient reports that she is an active person. Encouraged patient to look for ways to be active throughout the day.        Medication Adherence-Medication adherence was assessed.      Substance, Sleep, Stress-Not assessed      Additional monitoring needed. Patient reports that she accidenlty left her cuff and needs to go and pick it up. This is why she hasn't taken a reading since 11/28/20.  Continue current diet/physical activity routine.       Addressed patient questions and patient has my contact information if needed prior to next outreach. Patient verbalizes understanding.      Explained the importance of self-monitoring and medication adherence. Encouraged the patient to communicate with their health  for lifestyle modifications to help improve or maintain a healthy lifestyle.               There are no preventive care reminders to display for this patient.      Last 5 Patient Entered Readings                                      Current 30 Day Average: 140/83     Recent Readings 11/28/2020 11/28/2020 11/27/2020 11/27/2020 11/10/2020    SBP (mmHg) 131 133 149 157 145    DBP (mmHg) 67 82 90 91 92    Pulse 69 71 83 80 92

## 2020-12-18 ENCOUNTER — TELEPHONE (OUTPATIENT)
Dept: ENDOSCOPY | Facility: HOSPITAL | Age: 65
End: 2020-12-18

## 2020-12-18 DIAGNOSIS — I10 ESSENTIAL HYPERTENSION: ICD-10-CM

## 2020-12-18 RX ORDER — OLMESARTAN MEDOXOMIL AND HYDROCHLOROTHIAZIDE 40/12.5 40; 12.5 MG/1; MG/1
1 TABLET ORAL DAILY
Qty: 90 TABLET | Refills: 0 | Status: CANCELLED | OUTPATIENT
Start: 2020-12-18

## 2020-12-18 NOTE — TELEPHONE ENCOUNTER
Pre op call complete. Instructions reviewed with patient. Patient expressed understanding. Instructions sent to patients email per patient request. [email or web address]

## 2020-12-19 ENCOUNTER — LAB VISIT (OUTPATIENT)
Dept: OTOLARYNGOLOGY | Facility: CLINIC | Age: 65
End: 2020-12-19
Payer: MEDICARE

## 2020-12-19 DIAGNOSIS — Z12.11 COLON CANCER SCREENING: ICD-10-CM

## 2020-12-19 DIAGNOSIS — Z80.0 FAMILY HISTORY OF COLON CANCER: ICD-10-CM

## 2020-12-19 PROCEDURE — U0003 INFECTIOUS AGENT DETECTION BY NUCLEIC ACID (DNA OR RNA); SEVERE ACUTE RESPIRATORY SYNDROME CORONAVIRUS 2 (SARS-COV-2) (CORONAVIRUS DISEASE [COVID-19]), AMPLIFIED PROBE TECHNIQUE, MAKING USE OF HIGH THROUGHPUT TECHNOLOGIES AS DESCRIBED BY CMS-2020-01-R: HCPCS

## 2020-12-20 LAB — SARS-COV-2 RNA RESP QL NAA+PROBE: NOT DETECTED

## 2020-12-22 DIAGNOSIS — Z12.11 ENCOUNTER FOR SCREENING COLONOSCOPY: Primary | ICD-10-CM

## 2020-12-22 RX ORDER — SERTRALINE HYDROCHLORIDE 25 MG/1
25 TABLET, FILM COATED ORAL DAILY
Qty: 30 TABLET | Refills: 11 | Status: SHIPPED | OUTPATIENT
Start: 2020-12-22 | End: 2021-12-05 | Stop reason: SDUPTHER

## 2020-12-31 ENCOUNTER — TELEPHONE (OUTPATIENT)
Dept: ENDOSCOPY | Facility: HOSPITAL | Age: 65
End: 2020-12-31

## 2020-12-31 DIAGNOSIS — Z13.9 SCREENING PROCEDURE: Primary | ICD-10-CM

## 2020-12-31 NOTE — TELEPHONE ENCOUNTER
Endoscopy pre op call complete. Miralax prep instructions reviewed with patient. Patient expressed understanding.

## 2021-01-04 ENCOUNTER — LAB VISIT (OUTPATIENT)
Dept: OTOLARYNGOLOGY | Facility: CLINIC | Age: 66
End: 2021-01-04
Payer: MEDICARE

## 2021-01-04 DIAGNOSIS — Z13.9 SCREENING PROCEDURE: ICD-10-CM

## 2021-01-04 PROCEDURE — U0003 INFECTIOUS AGENT DETECTION BY NUCLEIC ACID (DNA OR RNA); SEVERE ACUTE RESPIRATORY SYNDROME CORONAVIRUS 2 (SARS-COV-2) (CORONAVIRUS DISEASE [COVID-19]), AMPLIFIED PROBE TECHNIQUE, MAKING USE OF HIGH THROUGHPUT TECHNOLOGIES AS DESCRIBED BY CMS-2020-01-R: HCPCS

## 2021-01-05 LAB — SARS-COV-2 RNA RESP QL NAA+PROBE: NOT DETECTED

## 2021-01-06 ENCOUNTER — TELEPHONE (OUTPATIENT)
Dept: INTERNAL MEDICINE | Facility: CLINIC | Age: 66
End: 2021-01-06

## 2021-01-06 ENCOUNTER — HOSPITAL ENCOUNTER (OUTPATIENT)
Facility: HOSPITAL | Age: 66
Discharge: HOME | End: 2021-01-06
Attending: INTERNAL MEDICINE | Admitting: INTERNAL MEDICINE
Payer: MEDICARE

## 2021-01-06 ENCOUNTER — ANESTHESIA EVENT (OUTPATIENT)
Dept: ENDOSCOPY | Facility: HOSPITAL | Age: 66
End: 2021-01-06
Payer: MEDICARE

## 2021-01-06 ENCOUNTER — ANESTHESIA (OUTPATIENT)
Dept: ENDOSCOPY | Facility: HOSPITAL | Age: 66
End: 2021-01-06
Payer: MEDICARE

## 2021-01-06 VITALS
SYSTOLIC BLOOD PRESSURE: 146 MMHG | BODY MASS INDEX: 40.48 KG/M2 | DIASTOLIC BLOOD PRESSURE: 79 MMHG | RESPIRATION RATE: 20 BRPM | OXYGEN SATURATION: 96 % | HEART RATE: 66 BPM | WEIGHT: 257.94 LBS | TEMPERATURE: 97 F | HEIGHT: 67 IN

## 2021-01-06 DIAGNOSIS — Z12.31 SCREENING MAMMOGRAM, ENCOUNTER FOR: Primary | ICD-10-CM

## 2021-01-06 DIAGNOSIS — Z80.0 FAMILY HISTORY OF COLON CANCER: Primary | ICD-10-CM

## 2021-01-06 PROCEDURE — G0105 COLORECTAL SCRN; HI RISK IND: HCPCS | Mod: ,,, | Performed by: INTERNAL MEDICINE

## 2021-01-06 PROCEDURE — 37000008 HC ANESTHESIA 1ST 15 MINUTES: Performed by: INTERNAL MEDICINE

## 2021-01-06 PROCEDURE — 63600175 PHARM REV CODE 636 W HCPCS: Performed by: NURSE ANESTHETIST, CERTIFIED REGISTERED

## 2021-01-06 PROCEDURE — G0105 COLORECTAL SCRN; HI RISK IND: ICD-10-PCS | Mod: ,,, | Performed by: INTERNAL MEDICINE

## 2021-01-06 PROCEDURE — 37000009 HC ANESTHESIA EA ADD 15 MINS: Performed by: INTERNAL MEDICINE

## 2021-01-06 PROCEDURE — G0105 COLORECTAL SCRN; HI RISK IND: HCPCS | Performed by: INTERNAL MEDICINE

## 2021-01-06 PROCEDURE — 25000003 PHARM REV CODE 250: Performed by: NURSE ANESTHETIST, CERTIFIED REGISTERED

## 2021-01-06 RX ORDER — SODIUM CHLORIDE, SODIUM LACTATE, POTASSIUM CHLORIDE, CALCIUM CHLORIDE 600; 310; 30; 20 MG/100ML; MG/100ML; MG/100ML; MG/100ML
INJECTION, SOLUTION INTRAVENOUS CONTINUOUS
Status: DISCONTINUED | OUTPATIENT
Start: 2021-01-06 | End: 2021-01-06 | Stop reason: HOSPADM

## 2021-01-06 RX ORDER — PROPOFOL 10 MG/ML
VIAL (ML) INTRAVENOUS
Status: DISCONTINUED | OUTPATIENT
Start: 2021-01-06 | End: 2021-01-06

## 2021-01-06 RX ORDER — SODIUM CHLORIDE, SODIUM LACTATE, POTASSIUM CHLORIDE, CALCIUM CHLORIDE 600; 310; 30; 20 MG/100ML; MG/100ML; MG/100ML; MG/100ML
INJECTION, SOLUTION INTRAVENOUS CONTINUOUS PRN
Status: DISCONTINUED | OUTPATIENT
Start: 2021-01-06 | End: 2021-01-06

## 2021-01-06 RX ORDER — LIDOCAINE HYDROCHLORIDE 10 MG/ML
INJECTION, SOLUTION EPIDURAL; INFILTRATION; INTRACAUDAL; PERINEURAL
Status: DISCONTINUED | OUTPATIENT
Start: 2021-01-06 | End: 2021-01-06

## 2021-01-06 RX ADMIN — PROPOFOL 20 MG: 10 INJECTION, EMULSION INTRAVENOUS at 09:01

## 2021-01-06 RX ADMIN — PROPOFOL 60 MG: 10 INJECTION, EMULSION INTRAVENOUS at 09:01

## 2021-01-06 RX ADMIN — SODIUM CHLORIDE, SODIUM LACTATE, POTASSIUM CHLORIDE, AND CALCIUM CHLORIDE: 600; 310; 30; 20 INJECTION, SOLUTION INTRAVENOUS at 09:01

## 2021-01-06 RX ADMIN — LIDOCAINE HYDROCHLORIDE 50 MG: 10 INJECTION, SOLUTION EPIDURAL; INFILTRATION; INTRACAUDAL; PERINEURAL at 09:01

## 2021-01-06 RX ADMIN — PROPOFOL 50 MG: 10 INJECTION, EMULSION INTRAVENOUS at 09:01

## 2021-01-25 ENCOUNTER — PATIENT MESSAGE (OUTPATIENT)
Dept: GYNECOLOGIC ONCOLOGY | Facility: CLINIC | Age: 66
End: 2021-01-25

## 2021-02-09 ENCOUNTER — LAB VISIT (OUTPATIENT)
Dept: LAB | Facility: HOSPITAL | Age: 66
End: 2021-02-09
Attending: OBSTETRICS & GYNECOLOGY
Payer: MEDICARE

## 2021-02-09 DIAGNOSIS — C56.3 MALIGNANT NEOPLASM OF BOTH OVARIES: ICD-10-CM

## 2021-02-09 LAB — CANCER AG125 SERPL-ACNC: 6 U/ML (ref 0–30)

## 2021-02-09 PROCEDURE — 36415 COLL VENOUS BLD VENIPUNCTURE: CPT

## 2021-02-09 PROCEDURE — 86304 IMMUNOASSAY TUMOR CA 125: CPT

## 2021-02-10 ENCOUNTER — OFFICE VISIT (OUTPATIENT)
Dept: GYNECOLOGIC ONCOLOGY | Facility: CLINIC | Age: 66
End: 2021-02-10
Payer: MEDICARE

## 2021-02-10 VITALS
HEART RATE: 73 BPM | WEIGHT: 255 LBS | DIASTOLIC BLOOD PRESSURE: 59 MMHG | SYSTOLIC BLOOD PRESSURE: 108 MMHG | BODY MASS INDEX: 39.94 KG/M2

## 2021-02-10 DIAGNOSIS — I10 ESSENTIAL HYPERTENSION: ICD-10-CM

## 2021-02-10 DIAGNOSIS — C78.6 PERITONEAL CARCINOMATOSIS: ICD-10-CM

## 2021-02-10 DIAGNOSIS — E78.2 MIXED HYPERLIPIDEMIA: ICD-10-CM

## 2021-02-10 DIAGNOSIS — C56.1 MALIGNANT NEOPLASM OF RIGHT OVARY: Primary | ICD-10-CM

## 2021-02-10 DIAGNOSIS — C56.3 MALIGNANT NEOPLASM OF BOTH OVARIES: ICD-10-CM

## 2021-02-10 PROCEDURE — 99214 PR OFFICE/OUTPT VISIT, EST, LEVL IV, 30-39 MIN: ICD-10-PCS | Mod: S$PBB,,, | Performed by: OBSTETRICS & GYNECOLOGY

## 2021-02-10 PROCEDURE — 99214 OFFICE O/P EST MOD 30 MIN: CPT | Mod: S$PBB,,, | Performed by: OBSTETRICS & GYNECOLOGY

## 2021-02-10 PROCEDURE — 99213 OFFICE O/P EST LOW 20 MIN: CPT | Mod: PBBFAC | Performed by: OBSTETRICS & GYNECOLOGY

## 2021-02-10 PROCEDURE — 99999 PR PBB SHADOW E&M-EST. PATIENT-LVL III: ICD-10-PCS | Mod: PBBFAC,,, | Performed by: OBSTETRICS & GYNECOLOGY

## 2021-02-10 PROCEDURE — 99999 PR PBB SHADOW E&M-EST. PATIENT-LVL III: CPT | Mod: PBBFAC,,, | Performed by: OBSTETRICS & GYNECOLOGY

## 2021-03-11 ENCOUNTER — TELEPHONE (OUTPATIENT)
Dept: GYNECOLOGIC ONCOLOGY | Facility: CLINIC | Age: 66
End: 2021-03-11

## 2021-03-19 ENCOUNTER — HOSPITAL ENCOUNTER (OUTPATIENT)
Dept: RADIOLOGY | Facility: HOSPITAL | Age: 66
Discharge: HOME | End: 2021-03-19
Attending: FAMILY MEDICINE
Payer: MEDICARE

## 2021-03-19 VITALS — HEIGHT: 67 IN | WEIGHT: 255.06 LBS | BODY MASS INDEX: 40.03 KG/M2

## 2021-03-19 DIAGNOSIS — Z12.31 SCREENING MAMMOGRAM, ENCOUNTER FOR: ICD-10-CM

## 2021-03-19 PROCEDURE — 77067 MAMMO DIGITAL SCREENING BILAT WITH TOMO: ICD-10-PCS | Mod: 26,,, | Performed by: RADIOLOGY

## 2021-03-19 PROCEDURE — 77067 SCR MAMMO BI INCL CAD: CPT | Mod: 26,,, | Performed by: RADIOLOGY

## 2021-03-19 PROCEDURE — 77063 MAMMO DIGITAL SCREENING BILAT WITH TOMO: ICD-10-PCS | Mod: 26,,, | Performed by: RADIOLOGY

## 2021-03-19 PROCEDURE — 77067 SCR MAMMO BI INCL CAD: CPT | Mod: TC

## 2021-03-19 PROCEDURE — 77063 BREAST TOMOSYNTHESIS BI: CPT | Mod: 26,,, | Performed by: RADIOLOGY

## 2021-04-09 DIAGNOSIS — C56.9 MALIGNANT NEOPLASM OF OVARY, UNSPECIFIED LATERALITY: ICD-10-CM

## 2021-04-09 DIAGNOSIS — M54.2 CERVICALGIA: ICD-10-CM

## 2021-04-09 DIAGNOSIS — M79.18 MYOFASCIAL PAIN ON RIGHT SIDE: ICD-10-CM

## 2021-04-09 RX ORDER — METHOCARBAMOL 500 MG/1
500 TABLET, FILM COATED ORAL 3 TIMES DAILY PRN
Qty: 60 TABLET | Refills: 0 | Status: CANCELLED | OUTPATIENT
Start: 2021-04-09

## 2021-04-12 RX ORDER — ROSUVASTATIN CALCIUM 10 MG/1
10 TABLET, COATED ORAL DAILY
Qty: 90 TABLET | Refills: 1 | Status: SHIPPED | OUTPATIENT
Start: 2021-04-12 | End: 2022-08-25 | Stop reason: SDUPTHER

## 2021-04-15 ENCOUNTER — DOCUMENTATION ONLY (OUTPATIENT)
Dept: REHABILITATION | Facility: HOSPITAL | Age: 66
End: 2021-04-15

## 2021-04-15 PROBLEM — M25.532 BILATERAL WRIST PAIN: Status: RESOLVED | Noted: 2020-12-03 | Resolved: 2021-04-15

## 2021-04-15 PROBLEM — M25.531 BILATERAL WRIST PAIN: Status: RESOLVED | Noted: 2020-12-03 | Resolved: 2021-04-15

## 2021-04-15 PROBLEM — Z78.9 DECREASED ACTIVITIES OF DAILY LIVING (ADL): Status: RESOLVED | Noted: 2020-12-03 | Resolved: 2021-04-15

## 2021-04-15 PROBLEM — M25.631 DECREASED RANGE OF MOTION OF RIGHT WRIST: Status: RESOLVED | Noted: 2020-12-03 | Resolved: 2021-04-15

## 2021-04-19 ENCOUNTER — TELEPHONE (OUTPATIENT)
Dept: GYNECOLOGIC ONCOLOGY | Facility: CLINIC | Age: 66
End: 2021-04-19

## 2021-04-28 ENCOUNTER — PATIENT MESSAGE (OUTPATIENT)
Dept: RESEARCH | Facility: HOSPITAL | Age: 66
End: 2021-04-28

## 2021-05-17 ENCOUNTER — LAB VISIT (OUTPATIENT)
Dept: LAB | Facility: HOSPITAL | Age: 66
End: 2021-05-17
Attending: OBSTETRICS & GYNECOLOGY
Payer: MEDICARE

## 2021-05-17 DIAGNOSIS — C56.3 MALIGNANT NEOPLASM OF BOTH OVARIES: ICD-10-CM

## 2021-05-17 PROCEDURE — 86304 IMMUNOASSAY TUMOR CA 125: CPT | Performed by: OBSTETRICS & GYNECOLOGY

## 2021-05-17 PROCEDURE — 36415 COLL VENOUS BLD VENIPUNCTURE: CPT | Performed by: OBSTETRICS & GYNECOLOGY

## 2021-05-18 ENCOUNTER — TELEPHONE (OUTPATIENT)
Dept: GYNECOLOGIC ONCOLOGY | Facility: CLINIC | Age: 66
End: 2021-05-18

## 2021-05-18 LAB — CANCER AG125 SERPL-ACNC: 6 U/ML (ref 0–30)

## 2021-05-20 ENCOUNTER — OFFICE VISIT (OUTPATIENT)
Dept: GYNECOLOGIC ONCOLOGY | Facility: CLINIC | Age: 66
End: 2021-05-20
Payer: MEDICARE

## 2021-05-20 VITALS
DIASTOLIC BLOOD PRESSURE: 60 MMHG | HEART RATE: 80 BPM | WEIGHT: 255.75 LBS | BODY MASS INDEX: 40.05 KG/M2 | SYSTOLIC BLOOD PRESSURE: 130 MMHG

## 2021-05-20 DIAGNOSIS — C56.3 MALIGNANT NEOPLASM OF BOTH OVARIES: Primary | ICD-10-CM

## 2021-05-20 DIAGNOSIS — C78.6 PERITONEAL CARCINOMATOSIS: ICD-10-CM

## 2021-05-20 DIAGNOSIS — Z86.79 HISTORY OF CHF (CONGESTIVE HEART FAILURE): ICD-10-CM

## 2021-05-20 PROCEDURE — 99999 PR PBB SHADOW E&M-EST. PATIENT-LVL III: ICD-10-PCS | Mod: PBBFAC,,, | Performed by: OBSTETRICS & GYNECOLOGY

## 2021-05-20 PROCEDURE — 99214 OFFICE O/P EST MOD 30 MIN: CPT | Mod: S$PBB,,, | Performed by: OBSTETRICS & GYNECOLOGY

## 2021-05-20 PROCEDURE — 99213 OFFICE O/P EST LOW 20 MIN: CPT | Mod: PBBFAC | Performed by: OBSTETRICS & GYNECOLOGY

## 2021-05-20 PROCEDURE — 99999 PR PBB SHADOW E&M-EST. PATIENT-LVL III: CPT | Mod: PBBFAC,,, | Performed by: OBSTETRICS & GYNECOLOGY

## 2021-05-20 PROCEDURE — 99214 PR OFFICE/OUTPT VISIT, EST, LEVL IV, 30-39 MIN: ICD-10-PCS | Mod: S$PBB,,, | Performed by: OBSTETRICS & GYNECOLOGY

## 2021-06-04 DIAGNOSIS — C56.9 MALIGNANT NEOPLASM OF OVARY, UNSPECIFIED LATERALITY: ICD-10-CM

## 2021-06-04 RX ORDER — ALPRAZOLAM 0.25 MG/1
0.25 TABLET ORAL 3 TIMES DAILY PRN
Qty: 60 TABLET | Refills: 2 | Status: SHIPPED | OUTPATIENT
Start: 2021-06-04 | End: 2021-12-13 | Stop reason: SDUPTHER

## 2021-08-31 ENCOUNTER — TELEPHONE (OUTPATIENT)
Dept: GYNECOLOGIC ONCOLOGY | Facility: CLINIC | Age: 66
End: 2021-08-31
Payer: MEDICAID

## 2021-09-15 ENCOUNTER — PATIENT MESSAGE (OUTPATIENT)
Dept: GYNECOLOGIC ONCOLOGY | Facility: CLINIC | Age: 66
End: 2021-09-15

## 2021-09-16 ENCOUNTER — PATIENT MESSAGE (OUTPATIENT)
Dept: GYNECOLOGIC ONCOLOGY | Facility: CLINIC | Age: 66
End: 2021-09-16

## 2021-09-21 ENCOUNTER — PATIENT OUTREACH (OUTPATIENT)
Dept: ADMINISTRATIVE | Facility: OTHER | Age: 66
End: 2021-09-21

## 2021-09-22 ENCOUNTER — OFFICE VISIT (OUTPATIENT)
Dept: DERMATOLOGY | Facility: CLINIC | Age: 66
End: 2021-09-22
Payer: MEDICARE

## 2021-09-22 DIAGNOSIS — L30.9 ECZEMA, UNSPECIFIED TYPE: Primary | ICD-10-CM

## 2021-09-22 PROCEDURE — 99999 PR PBB SHADOW E&M-EST. PATIENT-LVL III: ICD-10-PCS | Mod: PBBFAC,,, | Performed by: PHYSICIAN ASSISTANT

## 2021-09-22 PROCEDURE — 99999 PR PBB SHADOW E&M-EST. PATIENT-LVL III: CPT | Mod: PBBFAC,,, | Performed by: PHYSICIAN ASSISTANT

## 2021-09-22 PROCEDURE — 99213 OFFICE O/P EST LOW 20 MIN: CPT | Mod: PBBFAC | Performed by: PHYSICIAN ASSISTANT

## 2021-09-22 PROCEDURE — 99214 OFFICE O/P EST MOD 30 MIN: CPT | Mod: S$PBB,,, | Performed by: PHYSICIAN ASSISTANT

## 2021-09-22 PROCEDURE — 99214 PR OFFICE/OUTPT VISIT, EST, LEVL IV, 30-39 MIN: ICD-10-PCS | Mod: S$PBB,,, | Performed by: PHYSICIAN ASSISTANT

## 2021-09-22 RX ORDER — HYDROXYZINE HYDROCHLORIDE 10 MG/1
10 TABLET, FILM COATED ORAL NIGHTLY PRN
Qty: 30 TABLET | Refills: 0 | Status: SHIPPED | OUTPATIENT
Start: 2021-09-22 | End: 2021-10-22

## 2021-09-22 RX ORDER — TRIAMCINOLONE ACETONIDE 1 MG/G
CREAM TOPICAL 2 TIMES DAILY
Qty: 30 G | Refills: 0 | Status: SHIPPED | OUTPATIENT
Start: 2021-09-22 | End: 2021-09-26 | Stop reason: SDUPTHER

## 2021-09-26 DIAGNOSIS — L30.9 ECZEMA, UNSPECIFIED TYPE: ICD-10-CM

## 2021-09-27 RX ORDER — TRIAMCINOLONE ACETONIDE 1 MG/G
CREAM TOPICAL 2 TIMES DAILY
Qty: 30 G | Refills: 0 | Status: SHIPPED | OUTPATIENT
Start: 2021-09-27 | End: 2021-10-11 | Stop reason: SDUPTHER

## 2021-09-28 ENCOUNTER — PATIENT MESSAGE (OUTPATIENT)
Dept: GYNECOLOGIC ONCOLOGY | Facility: CLINIC | Age: 66
End: 2021-09-28

## 2021-09-30 ENCOUNTER — TELEPHONE (OUTPATIENT)
Dept: GYNECOLOGIC ONCOLOGY | Facility: CLINIC | Age: 66
End: 2021-09-30

## 2021-10-07 ENCOUNTER — TELEPHONE (OUTPATIENT)
Dept: GYNECOLOGIC ONCOLOGY | Facility: CLINIC | Age: 66
End: 2021-10-07

## 2021-10-07 ENCOUNTER — LAB VISIT (OUTPATIENT)
Dept: LAB | Facility: HOSPITAL | Age: 66
End: 2021-10-07
Attending: OBSTETRICS & GYNECOLOGY
Payer: MEDICAID

## 2021-10-07 DIAGNOSIS — C56.3 MALIGNANT NEOPLASM OF BOTH OVARIES: ICD-10-CM

## 2021-10-07 LAB — CANCER AG125 SERPL-ACNC: 5 U/ML (ref 0–30)

## 2021-10-07 PROCEDURE — 36415 COLL VENOUS BLD VENIPUNCTURE: CPT | Performed by: OBSTETRICS & GYNECOLOGY

## 2021-10-07 PROCEDURE — 86304 IMMUNOASSAY TUMOR CA 125: CPT | Performed by: OBSTETRICS & GYNECOLOGY

## 2021-10-08 ENCOUNTER — OFFICE VISIT (OUTPATIENT)
Dept: GYNECOLOGIC ONCOLOGY | Facility: CLINIC | Age: 66
End: 2021-10-08
Payer: MEDICARE

## 2021-10-08 VITALS
DIASTOLIC BLOOD PRESSURE: 63 MMHG | SYSTOLIC BLOOD PRESSURE: 143 MMHG | WEIGHT: 257.94 LBS | BODY MASS INDEX: 40.4 KG/M2 | HEART RATE: 86 BPM

## 2021-10-08 DIAGNOSIS — C78.6 PERITONEAL CARCINOMATOSIS: ICD-10-CM

## 2021-10-08 DIAGNOSIS — C56.1 MALIGNANT NEOPLASM OF RIGHT OVARY: Primary | ICD-10-CM

## 2021-10-08 PROCEDURE — 99999 PR PBB SHADOW E&M-EST. PATIENT-LVL IV: CPT | Mod: PBBFAC,,, | Performed by: OBSTETRICS & GYNECOLOGY

## 2021-10-08 PROCEDURE — 99999 PR PBB SHADOW E&M-EST. PATIENT-LVL IV: ICD-10-PCS | Mod: PBBFAC,,, | Performed by: OBSTETRICS & GYNECOLOGY

## 2021-10-08 PROCEDURE — 99214 PR OFFICE/OUTPT VISIT, EST, LEVL IV, 30-39 MIN: ICD-10-PCS | Mod: S$PBB,,, | Performed by: OBSTETRICS & GYNECOLOGY

## 2021-10-08 PROCEDURE — 99214 OFFICE O/P EST MOD 30 MIN: CPT | Mod: PBBFAC | Performed by: OBSTETRICS & GYNECOLOGY

## 2021-10-08 PROCEDURE — 99214 OFFICE O/P EST MOD 30 MIN: CPT | Mod: S$PBB,,, | Performed by: OBSTETRICS & GYNECOLOGY

## 2021-10-11 DIAGNOSIS — L30.9 ECZEMA, UNSPECIFIED TYPE: ICD-10-CM

## 2021-10-11 RX ORDER — TRIAMCINOLONE ACETONIDE 1 MG/G
CREAM TOPICAL 2 TIMES DAILY
Qty: 30 G | Refills: 0 | Status: SHIPPED | OUTPATIENT
Start: 2021-10-11 | End: 2021-12-22

## 2021-10-17 DIAGNOSIS — I10 HYPERTENSION, UNSPECIFIED TYPE: ICD-10-CM

## 2021-10-18 RX ORDER — ATENOLOL 25 MG/1
25 TABLET ORAL DAILY
Qty: 30 TABLET | Refills: 11 | Status: SHIPPED | OUTPATIENT
Start: 2021-10-18 | End: 2022-09-29 | Stop reason: SDUPTHER

## 2021-11-05 DIAGNOSIS — C56.9 MALIGNANT NEOPLASM OF OVARY, UNSPECIFIED LATERALITY: ICD-10-CM

## 2021-11-05 RX ORDER — AMLODIPINE BESYLATE 5 MG/1
10 TABLET ORAL DAILY
Qty: 60 TABLET | Refills: 11 | Status: SHIPPED | OUTPATIENT
Start: 2021-11-05 | End: 2022-10-12 | Stop reason: SDUPTHER

## 2021-11-22 ENCOUNTER — PATIENT MESSAGE (OUTPATIENT)
Dept: PHARMACY | Facility: CLINIC | Age: 66
End: 2021-11-22
Payer: MEDICARE

## 2021-12-05 RX ORDER — SERTRALINE HYDROCHLORIDE 25 MG/1
25 TABLET, FILM COATED ORAL DAILY
Qty: 30 TABLET | Refills: 11 | Status: SHIPPED | OUTPATIENT
Start: 2021-12-05 | End: 2022-11-11 | Stop reason: SDUPTHER

## 2021-12-13 ENCOUNTER — OFFICE VISIT (OUTPATIENT)
Dept: INTERNAL MEDICINE | Facility: CLINIC | Age: 66
End: 2021-12-13
Payer: MEDICARE

## 2021-12-13 VITALS
OXYGEN SATURATION: 96 % | SYSTOLIC BLOOD PRESSURE: 118 MMHG | DIASTOLIC BLOOD PRESSURE: 72 MMHG | HEART RATE: 81 BPM | BODY MASS INDEX: 39.17 KG/M2 | HEIGHT: 67 IN | WEIGHT: 249.56 LBS | TEMPERATURE: 97 F

## 2021-12-13 DIAGNOSIS — I10 PRIMARY HYPERTENSION: ICD-10-CM

## 2021-12-13 DIAGNOSIS — Z00.00 ANNUAL PHYSICAL EXAM: Primary | ICD-10-CM

## 2021-12-13 DIAGNOSIS — C56.9 MALIGNANT NEOPLASM OF OVARY, UNSPECIFIED LATERALITY: ICD-10-CM

## 2021-12-13 DIAGNOSIS — E66.01 MORBID OBESITY: ICD-10-CM

## 2021-12-13 DIAGNOSIS — I27.9 PULMONARY HEART DISEASE, UNSPECIFIED: ICD-10-CM

## 2021-12-13 DIAGNOSIS — G62.9 NEUROPATHY: ICD-10-CM

## 2021-12-13 DIAGNOSIS — Z90.722 S/P BSO (BILATERAL SALPINGO-OOPHORECTOMY): ICD-10-CM

## 2021-12-13 DIAGNOSIS — C56.3 OVARIAN CANCER, BILATERAL: ICD-10-CM

## 2021-12-13 DIAGNOSIS — M50.30 DDD (DEGENERATIVE DISC DISEASE), CERVICAL: ICD-10-CM

## 2021-12-13 DIAGNOSIS — Z78.0 POSTMENOPAUSAL: ICD-10-CM

## 2021-12-13 DIAGNOSIS — E78.2 MIXED HYPERLIPIDEMIA: ICD-10-CM

## 2021-12-13 PROBLEM — C56.1 MALIGNANT NEOPLASM OF RIGHT OVARY: Status: RESOLVED | Noted: 2019-02-15 | Resolved: 2021-12-13

## 2021-12-13 PROCEDURE — 99397 PER PM REEVAL EST PAT 65+ YR: CPT | Mod: GZ,S$PBB,, | Performed by: FAMILY MEDICINE

## 2021-12-13 PROCEDURE — 99397 PR PREVENTIVE VISIT,EST,65 & OVER: ICD-10-PCS | Mod: GZ,S$PBB,, | Performed by: FAMILY MEDICINE

## 2021-12-13 PROCEDURE — 99999 PR PBB SHADOW E&M-EST. PATIENT-LVL IV: CPT | Mod: PBBFAC,,, | Performed by: FAMILY MEDICINE

## 2021-12-13 PROCEDURE — 99999 PR PBB SHADOW E&M-EST. PATIENT-LVL IV: ICD-10-PCS | Mod: PBBFAC,,, | Performed by: FAMILY MEDICINE

## 2021-12-13 PROCEDURE — 99214 OFFICE O/P EST MOD 30 MIN: CPT | Mod: PBBFAC | Performed by: FAMILY MEDICINE

## 2021-12-13 RX ORDER — GABAPENTIN 100 MG/1
100 CAPSULE ORAL 3 TIMES DAILY
Qty: 90 CAPSULE | Refills: 11 | Status: SHIPPED | OUTPATIENT
Start: 2021-12-13 | End: 2024-03-06 | Stop reason: SDUPTHER

## 2021-12-13 RX ORDER — ALPRAZOLAM 0.25 MG/1
0.25 TABLET ORAL 3 TIMES DAILY PRN
Qty: 60 TABLET | Refills: 2 | Status: SHIPPED | OUTPATIENT
Start: 2021-12-13 | End: 2022-12-12 | Stop reason: SDUPTHER

## 2021-12-22 ENCOUNTER — OFFICE VISIT (OUTPATIENT)
Dept: INTERNAL MEDICINE | Facility: CLINIC | Age: 66
End: 2021-12-22
Payer: MEDICARE

## 2021-12-22 ENCOUNTER — TELEPHONE (OUTPATIENT)
Dept: INTERNAL MEDICINE | Facility: CLINIC | Age: 66
End: 2021-12-22
Payer: MEDICARE

## 2021-12-22 ENCOUNTER — PATIENT MESSAGE (OUTPATIENT)
Dept: INTERNAL MEDICINE | Facility: CLINIC | Age: 66
End: 2021-12-22

## 2021-12-22 DIAGNOSIS — R09.82 PND (POST-NASAL DRIP): Primary | ICD-10-CM

## 2021-12-22 DIAGNOSIS — G43.909 MIGRAINE WITHOUT STATUS MIGRAINOSUS, NOT INTRACTABLE, UNSPECIFIED MIGRAINE TYPE: ICD-10-CM

## 2021-12-22 PROCEDURE — 99213 OFFICE O/P EST LOW 20 MIN: CPT | Mod: 95,,,

## 2021-12-22 PROCEDURE — 99213 PR OFFICE/OUTPT VISIT, EST, LEVL III, 20-29 MIN: ICD-10-PCS | Mod: 95,,,

## 2021-12-22 RX ORDER — CETIRIZINE HYDROCHLORIDE 10 MG/1
10 TABLET ORAL DAILY
Qty: 30 TABLET | Refills: 5 | Status: SHIPPED | OUTPATIENT
Start: 2021-12-22 | End: 2023-12-13

## 2021-12-22 RX ORDER — BUTALBITAL, ACETAMINOPHEN AND CAFFEINE 50; 325; 40 MG/1; MG/1; MG/1
1 TABLET ORAL EVERY 6 HOURS PRN
Qty: 30 TABLET | Refills: 0 | Status: SHIPPED | OUTPATIENT
Start: 2021-12-22 | End: 2022-01-21

## 2021-12-23 ENCOUNTER — PATIENT MESSAGE (OUTPATIENT)
Dept: INTERNAL MEDICINE | Facility: CLINIC | Age: 66
End: 2021-12-23
Payer: MEDICARE

## 2021-12-23 RX ORDER — SUMATRIPTAN 50 MG/1
50 TABLET, FILM COATED ORAL
Qty: 30 TABLET | Refills: 1 | Status: SHIPPED | OUTPATIENT
Start: 2021-12-23 | End: 2023-12-13

## 2021-12-28 ENCOUNTER — PATIENT MESSAGE (OUTPATIENT)
Dept: ADMINISTRATIVE | Facility: OTHER | Age: 66
End: 2021-12-28
Payer: MEDICARE

## 2021-12-30 ENCOUNTER — IMMUNIZATION (OUTPATIENT)
Dept: PRIMARY CARE CLINIC | Facility: CLINIC | Age: 66
End: 2021-12-30
Payer: MEDICARE

## 2021-12-30 DIAGNOSIS — Z23 NEED FOR VACCINATION: Primary | ICD-10-CM

## 2021-12-30 PROCEDURE — 0004A COVID-19, MRNA, LNP-S, PF, 30 MCG/0.3 ML DOSE VACCINE: CPT | Mod: CV19,PBBFAC | Performed by: FAMILY MEDICINE

## 2022-01-06 ENCOUNTER — APPOINTMENT (OUTPATIENT)
Dept: RADIOLOGY | Facility: HOSPITAL | Age: 67
End: 2022-01-06
Attending: FAMILY MEDICINE
Payer: MEDICARE

## 2022-01-06 DIAGNOSIS — Z78.0 POSTMENOPAUSAL: ICD-10-CM

## 2022-01-06 PROCEDURE — 77080 DXA BONE DENSITY AXIAL: CPT | Mod: 26,,, | Performed by: RADIOLOGY

## 2022-01-06 PROCEDURE — 77080 DEXA BONE DENSITY SPINE HIP: ICD-10-PCS | Mod: 26,,, | Performed by: RADIOLOGY

## 2022-01-06 PROCEDURE — 77080 DXA BONE DENSITY AXIAL: CPT | Mod: TC

## 2022-01-10 ENCOUNTER — TELEPHONE (OUTPATIENT)
Dept: GYNECOLOGIC ONCOLOGY | Facility: CLINIC | Age: 67
End: 2022-01-10
Payer: MEDICARE

## 2022-01-10 NOTE — TELEPHONE ENCOUNTER
Spoke with our patient about her reschedule appointment in gyn oncology she agreed she voiced understanding of the date, time and location. All questions answered appointment mail. MA/VEL /Preceptor Tomasz VALENCIA

## 2022-01-11 DIAGNOSIS — D64.9 ANEMIA, UNSPECIFIED TYPE: Primary | ICD-10-CM

## 2022-01-31 ENCOUNTER — LAB VISIT (OUTPATIENT)
Dept: LAB | Facility: HOSPITAL | Age: 67
End: 2022-01-31
Attending: FAMILY MEDICINE
Payer: MEDICARE

## 2022-01-31 ENCOUNTER — OFFICE VISIT (OUTPATIENT)
Dept: GYNECOLOGIC ONCOLOGY | Facility: CLINIC | Age: 67
End: 2022-01-31
Payer: MEDICARE

## 2022-01-31 VITALS
DIASTOLIC BLOOD PRESSURE: 65 MMHG | SYSTOLIC BLOOD PRESSURE: 138 MMHG | BODY MASS INDEX: 39.78 KG/M2 | HEART RATE: 88 BPM | WEIGHT: 254 LBS

## 2022-01-31 DIAGNOSIS — D64.9 ANEMIA, UNSPECIFIED TYPE: ICD-10-CM

## 2022-01-31 DIAGNOSIS — Z51.11 ENCOUNTER FOR ANTINEOPLASTIC CHEMOTHERAPY: ICD-10-CM

## 2022-01-31 DIAGNOSIS — C56.3 OVARIAN CANCER, BILATERAL: Primary | ICD-10-CM

## 2022-01-31 DIAGNOSIS — C56.3 OVARIAN CANCER, BILATERAL: ICD-10-CM

## 2022-01-31 DIAGNOSIS — C78.6 PERITONEAL CARCINOMATOSIS: ICD-10-CM

## 2022-01-31 LAB
BASOPHILS # BLD AUTO: 0.09 K/UL (ref 0–0.2)
BASOPHILS NFR BLD: 1.1 % (ref 0–1.9)
CANCER AG125 SERPL-ACNC: 6 U/ML (ref 0–30)
DIFFERENTIAL METHOD: ABNORMAL
EOSINOPHIL # BLD AUTO: 0.2 K/UL (ref 0–0.5)
EOSINOPHIL NFR BLD: 2.1 % (ref 0–8)
ERYTHROCYTE [DISTWIDTH] IN BLOOD BY AUTOMATED COUNT: 15.2 % (ref 11.5–14.5)
HCT VFR BLD AUTO: 38.2 % (ref 37–48.5)
HGB BLD-MCNC: 11.7 G/DL (ref 12–16)
IMM GRANULOCYTES # BLD AUTO: 0.03 K/UL (ref 0–0.04)
IMM GRANULOCYTES NFR BLD AUTO: 0.4 % (ref 0–0.5)
LYMPHOCYTES # BLD AUTO: 2.7 K/UL (ref 1–4.8)
LYMPHOCYTES NFR BLD: 33.6 % (ref 18–48)
MCH RBC QN AUTO: 28.1 PG (ref 27–31)
MCHC RBC AUTO-ENTMCNC: 30.6 G/DL (ref 32–36)
MCV RBC AUTO: 92 FL (ref 82–98)
MONOCYTES # BLD AUTO: 0.5 K/UL (ref 0.3–1)
MONOCYTES NFR BLD: 6.7 % (ref 4–15)
NEUTROPHILS # BLD AUTO: 4.5 K/UL (ref 1.8–7.7)
NEUTROPHILS NFR BLD: 56.1 % (ref 38–73)
NRBC BLD-RTO: 0 /100 WBC
PLATELET # BLD AUTO: 235 K/UL (ref 150–450)
PMV BLD AUTO: 11 FL (ref 9.2–12.9)
RBC # BLD AUTO: 4.17 M/UL (ref 4–5.4)
WBC # BLD AUTO: 8.03 K/UL (ref 3.9–12.7)

## 2022-01-31 PROCEDURE — 99214 PR OFFICE/OUTPT VISIT, EST, LEVL IV, 30-39 MIN: ICD-10-PCS | Mod: S$GLB,,, | Performed by: OBSTETRICS & GYNECOLOGY

## 2022-01-31 PROCEDURE — 3288F FALL RISK ASSESSMENT DOCD: CPT | Mod: CPTII,S$GLB,, | Performed by: OBSTETRICS & GYNECOLOGY

## 2022-01-31 PROCEDURE — 1101F PR PT FALLS ASSESS DOC 0-1 FALLS W/OUT INJ PAST YR: ICD-10-PCS | Mod: CPTII,S$GLB,, | Performed by: OBSTETRICS & GYNECOLOGY

## 2022-01-31 PROCEDURE — 1159F MED LIST DOCD IN RCRD: CPT | Mod: CPTII,S$GLB,, | Performed by: OBSTETRICS & GYNECOLOGY

## 2022-01-31 PROCEDURE — 99214 OFFICE O/P EST MOD 30 MIN: CPT | Mod: S$GLB,,, | Performed by: OBSTETRICS & GYNECOLOGY

## 2022-01-31 PROCEDURE — 1126F PR PAIN SEVERITY QUANTIFIED, NO PAIN PRESENT: ICD-10-PCS | Mod: CPTII,S$GLB,, | Performed by: OBSTETRICS & GYNECOLOGY

## 2022-01-31 PROCEDURE — 3008F BODY MASS INDEX DOCD: CPT | Mod: CPTII,S$GLB,, | Performed by: OBSTETRICS & GYNECOLOGY

## 2022-01-31 PROCEDURE — 1126F AMNT PAIN NOTED NONE PRSNT: CPT | Mod: CPTII,S$GLB,, | Performed by: OBSTETRICS & GYNECOLOGY

## 2022-01-31 PROCEDURE — 1101F PT FALLS ASSESS-DOCD LE1/YR: CPT | Mod: CPTII,S$GLB,, | Performed by: OBSTETRICS & GYNECOLOGY

## 2022-01-31 PROCEDURE — 3078F PR MOST RECENT DIASTOLIC BLOOD PRESSURE < 80 MM HG: ICD-10-PCS | Mod: CPTII,S$GLB,, | Performed by: OBSTETRICS & GYNECOLOGY

## 2022-01-31 PROCEDURE — 85025 COMPLETE CBC W/AUTO DIFF WBC: CPT | Performed by: FAMILY MEDICINE

## 2022-01-31 PROCEDURE — 3075F SYST BP GE 130 - 139MM HG: CPT | Mod: CPTII,S$GLB,, | Performed by: OBSTETRICS & GYNECOLOGY

## 2022-01-31 PROCEDURE — 99999 PR PBB SHADOW E&M-EST. PATIENT-LVL III: CPT | Mod: PBBFAC,,, | Performed by: OBSTETRICS & GYNECOLOGY

## 2022-01-31 PROCEDURE — 3075F PR MOST RECENT SYSTOLIC BLOOD PRESS GE 130-139MM HG: ICD-10-PCS | Mod: CPTII,S$GLB,, | Performed by: OBSTETRICS & GYNECOLOGY

## 2022-01-31 PROCEDURE — 3078F DIAST BP <80 MM HG: CPT | Mod: CPTII,S$GLB,, | Performed by: OBSTETRICS & GYNECOLOGY

## 2022-01-31 PROCEDURE — 3288F PR FALLS RISK ASSESSMENT DOCUMENTED: ICD-10-PCS | Mod: CPTII,S$GLB,, | Performed by: OBSTETRICS & GYNECOLOGY

## 2022-01-31 PROCEDURE — 99999 PR PBB SHADOW E&M-EST. PATIENT-LVL III: ICD-10-PCS | Mod: PBBFAC,,, | Performed by: OBSTETRICS & GYNECOLOGY

## 2022-01-31 PROCEDURE — 36415 COLL VENOUS BLD VENIPUNCTURE: CPT | Performed by: OBSTETRICS & GYNECOLOGY

## 2022-01-31 PROCEDURE — 3008F PR BODY MASS INDEX (BMI) DOCUMENTED: ICD-10-PCS | Mod: CPTII,S$GLB,, | Performed by: OBSTETRICS & GYNECOLOGY

## 2022-01-31 PROCEDURE — 1160F PR REVIEW ALL MEDS BY PRESCRIBER/CLIN PHARMACIST DOCUMENTED: ICD-10-PCS | Mod: CPTII,S$GLB,, | Performed by: OBSTETRICS & GYNECOLOGY

## 2022-01-31 PROCEDURE — 1159F PR MEDICATION LIST DOCUMENTED IN MEDICAL RECORD: ICD-10-PCS | Mod: CPTII,S$GLB,, | Performed by: OBSTETRICS & GYNECOLOGY

## 2022-01-31 PROCEDURE — 86304 IMMUNOASSAY TUMOR CA 125: CPT | Performed by: OBSTETRICS & GYNECOLOGY

## 2022-01-31 PROCEDURE — 1160F RVW MEDS BY RX/DR IN RCRD: CPT | Mod: CPTII,S$GLB,, | Performed by: OBSTETRICS & GYNECOLOGY

## 2022-01-31 NOTE — PROGRESS NOTES
Subjective:      Patient ID: Casie Gaines is a 66 y.o. female.    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     Here today for continued surveillance.  CA-125 last visit was stable at 5.  No new symptoms.  Denies F/C, N/V, VB.  Has had normal bladder and bowel function.  Review of Systems   Constitutional: Negative for activity change, appetite change, chills, fatigue and fever.   HENT: Negative for hearing loss, mouth sores, nosebleeds, sore throat and tinnitus.    Eyes: Negative for visual disturbance.   Respiratory: Negative for cough, chest tightness, shortness of breath and wheezing.    Cardiovascular: Negative for chest pain and leg swelling.   Gastrointestinal: Negative for abdominal distention, abdominal pain, blood in stool, constipation, diarrhea, nausea and vomiting.   Genitourinary: Negative for dysuria, flank pain, frequency, hematuria, pelvic pain, vaginal bleeding, vaginal discharge and vaginal pain.   Musculoskeletal: Negative for arthralgias and back pain.   Skin: Negative for rash.   Neurological: Negative for dizziness, seizures, syncope, weakness and numbness.   Hematological: Does not bruise/bleed easily.   Psychiatric/Behavioral: Negative for confusion and sleep disturbance. The patient is not nervous/anxious.        Objective:   Physical Exam:   Constitutional: She appears well-developed and well-nourished. No  distress.    HENT:   Head: Normocephalic and atraumatic.    Eyes: No scleral icterus.     Cardiovascular: Normal rate and intact distal pulses.  Exam reveals no cyanosis and no edema.     Pulmonary/Chest: Effort normal. No respiratory distress. She exhibits no tenderness.        Abdominal: Soft. She exhibits no distension (incisions healing well), no fluid wave and no mass. There is no abdominal tenderness. There is no rebound and no guarding. No hernia.     Genitourinary:    Vagina and rectum normal.      Pelvic exam was performed with patient supine.   Labial bartholins normal.There is no rash, tenderness or lesion on the right labia. There is no rash, tenderness or lesion on the left labia. Right adnexum displays no mass, no tenderness and no fullness. Left adnexum displays no mass, no tenderness and no fullness. Vaginal cuff normal.  No  no vaginal discharge, bleeding or unspecified prolapse of vaginal walls in the vagina. Cervix is absent.Uterus is absent.              Lymphadenopathy:     She has no cervical adenopathy. No inguinal adenopathy noted on the right or left side.     Skin: No cyanosis.        Assessment:     1. Ovarian cancer, bilateral    2. Peritoneal carcinomatosis    3. Encounter for antineoplastic chemotherapy        Plan:       Patient is doing well and is CLYDE on exam and by CA-125 criteria.  Continue active observation and CA-125 assessment at 3 month intervals

## 2022-02-01 ENCOUNTER — TELEPHONE (OUTPATIENT)
Dept: GYNECOLOGIC ONCOLOGY | Facility: CLINIC | Age: 67
End: 2022-02-01
Payer: MEDICARE

## 2022-02-01 NOTE — TELEPHONE ENCOUNTER
----- Message from Ismael Juarez MD sent at 2/1/2022  9:01 AM CST -----  CA-125 normal, please let patient know

## 2022-03-29 ENCOUNTER — TELEPHONE (OUTPATIENT)
Dept: ADMINISTRATIVE | Facility: HOSPITAL | Age: 67
End: 2022-03-29
Payer: MEDICARE

## 2022-03-30 ENCOUNTER — OFFICE VISIT (OUTPATIENT)
Dept: INTERNAL MEDICINE | Facility: CLINIC | Age: 67
End: 2022-03-30
Payer: MEDICARE

## 2022-03-30 VITALS
BODY MASS INDEX: 39.31 KG/M2 | HEIGHT: 67 IN | WEIGHT: 250.44 LBS | HEART RATE: 95 BPM | SYSTOLIC BLOOD PRESSURE: 118 MMHG | TEMPERATURE: 98 F | DIASTOLIC BLOOD PRESSURE: 70 MMHG | OXYGEN SATURATION: 96 %

## 2022-03-30 DIAGNOSIS — H92.09 OTALGIA, UNSPECIFIED LATERALITY: ICD-10-CM

## 2022-03-30 DIAGNOSIS — E66.01 MORBID OBESITY: ICD-10-CM

## 2022-03-30 DIAGNOSIS — J32.9 SINUSITIS, UNSPECIFIED CHRONICITY, UNSPECIFIED LOCATION: ICD-10-CM

## 2022-03-30 DIAGNOSIS — Z90.722 S/P BSO (BILATERAL SALPINGO-OOPHORECTOMY): ICD-10-CM

## 2022-03-30 DIAGNOSIS — E78.2 MIXED HYPERLIPIDEMIA: ICD-10-CM

## 2022-03-30 DIAGNOSIS — Z12.31 SCREENING MAMMOGRAM, ENCOUNTER FOR: ICD-10-CM

## 2022-03-30 DIAGNOSIS — I10 PRIMARY HYPERTENSION: ICD-10-CM

## 2022-03-30 DIAGNOSIS — C56.3 OVARIAN CANCER, BILATERAL: ICD-10-CM

## 2022-03-30 DIAGNOSIS — R41.840 CONCENTRATION DEFICIT: ICD-10-CM

## 2022-03-30 DIAGNOSIS — I27.20 PULMONARY HTN: ICD-10-CM

## 2022-03-30 DIAGNOSIS — R09.81 NASAL CONGESTION: Primary | ICD-10-CM

## 2022-03-30 LAB
CTP QC/QA: YES
CTP QC/QA: YES
FLUAV AG NPH QL: NEGATIVE
FLUBV AG NPH QL: NEGATIVE
SARS-COV-2 RDRP RESP QL NAA+PROBE: NEGATIVE

## 2022-03-30 PROCEDURE — 3288F PR FALLS RISK ASSESSMENT DOCUMENTED: ICD-10-PCS | Mod: CPTII,S$GLB,, | Performed by: FAMILY MEDICINE

## 2022-03-30 PROCEDURE — 1125F AMNT PAIN NOTED PAIN PRSNT: CPT | Mod: CPTII,S$GLB,, | Performed by: FAMILY MEDICINE

## 2022-03-30 PROCEDURE — 87804 INFLUENZA ASSAY W/OPTIC: CPT | Mod: QW,S$GLB,, | Performed by: FAMILY MEDICINE

## 2022-03-30 PROCEDURE — U0002: ICD-10-PCS | Mod: QW,S$GLB,, | Performed by: FAMILY MEDICINE

## 2022-03-30 PROCEDURE — 1159F PR MEDICATION LIST DOCUMENTED IN MEDICAL RECORD: ICD-10-PCS | Mod: CPTII,S$GLB,, | Performed by: FAMILY MEDICINE

## 2022-03-30 PROCEDURE — 99999 PR PBB SHADOW E&M-EST. PATIENT-LVL V: ICD-10-PCS | Mod: PBBFAC,,, | Performed by: FAMILY MEDICINE

## 2022-03-30 PROCEDURE — 1101F PT FALLS ASSESS-DOCD LE1/YR: CPT | Mod: CPTII,S$GLB,, | Performed by: FAMILY MEDICINE

## 2022-03-30 PROCEDURE — 3074F PR MOST RECENT SYSTOLIC BLOOD PRESSURE < 130 MM HG: ICD-10-PCS | Mod: CPTII,S$GLB,, | Performed by: FAMILY MEDICINE

## 2022-03-30 PROCEDURE — 99214 OFFICE O/P EST MOD 30 MIN: CPT | Mod: S$GLB,,, | Performed by: FAMILY MEDICINE

## 2022-03-30 PROCEDURE — 1125F PR PAIN SEVERITY QUANTIFIED, PAIN PRESENT: ICD-10-PCS | Mod: CPTII,S$GLB,, | Performed by: FAMILY MEDICINE

## 2022-03-30 PROCEDURE — 3008F BODY MASS INDEX DOCD: CPT | Mod: CPTII,S$GLB,, | Performed by: FAMILY MEDICINE

## 2022-03-30 PROCEDURE — 3078F PR MOST RECENT DIASTOLIC BLOOD PRESSURE < 80 MM HG: ICD-10-PCS | Mod: CPTII,S$GLB,, | Performed by: FAMILY MEDICINE

## 2022-03-30 PROCEDURE — 3008F PR BODY MASS INDEX (BMI) DOCUMENTED: ICD-10-PCS | Mod: CPTII,S$GLB,, | Performed by: FAMILY MEDICINE

## 2022-03-30 PROCEDURE — 87804 POCT INFLUENZA A/B: ICD-10-PCS | Mod: QW,S$GLB,, | Performed by: FAMILY MEDICINE

## 2022-03-30 PROCEDURE — 3074F SYST BP LT 130 MM HG: CPT | Mod: CPTII,S$GLB,, | Performed by: FAMILY MEDICINE

## 2022-03-30 PROCEDURE — 1101F PR PT FALLS ASSESS DOC 0-1 FALLS W/OUT INJ PAST YR: ICD-10-PCS | Mod: CPTII,S$GLB,, | Performed by: FAMILY MEDICINE

## 2022-03-30 PROCEDURE — 99214 PR OFFICE/OUTPT VISIT, EST, LEVL IV, 30-39 MIN: ICD-10-PCS | Mod: S$GLB,,, | Performed by: FAMILY MEDICINE

## 2022-03-30 PROCEDURE — U0002 COVID-19 LAB TEST NON-CDC: HCPCS | Mod: QW,S$GLB,, | Performed by: FAMILY MEDICINE

## 2022-03-30 PROCEDURE — 3288F FALL RISK ASSESSMENT DOCD: CPT | Mod: CPTII,S$GLB,, | Performed by: FAMILY MEDICINE

## 2022-03-30 PROCEDURE — 3078F DIAST BP <80 MM HG: CPT | Mod: CPTII,S$GLB,, | Performed by: FAMILY MEDICINE

## 2022-03-30 PROCEDURE — 1159F MED LIST DOCD IN RCRD: CPT | Mod: CPTII,S$GLB,, | Performed by: FAMILY MEDICINE

## 2022-03-30 PROCEDURE — 99999 PR PBB SHADOW E&M-EST. PATIENT-LVL V: CPT | Mod: PBBFAC,,, | Performed by: FAMILY MEDICINE

## 2022-03-30 RX ORDER — AMOXICILLIN 875 MG/1
875 TABLET, FILM COATED ORAL EVERY 12 HOURS
Qty: 14 TABLET | Refills: 0 | Status: SHIPPED | OUTPATIENT
Start: 2022-03-30 | End: 2022-12-12

## 2022-03-30 NOTE — PROGRESS NOTES
Casie Frias Givens  03/30/2022  [id number]    Rossana nAg MD  Patient Care Team:  Rossana Ang MD as PCP - General (Family Medicine)  Radha Foley LPN as Care Coordinator (Internal Medicine)  Rita Sawant, PharmD as Pharmacist (Oncology)  Baltazar JERMAINE Hernández (Inactive) as Digital Medicine Health   Gerri Meyers RD (Inactive) as Dietitian (Nutrition)  Jessica Brink, PharmD as Hypertension Digital Medicine Clinician (Pharmacist)  Rossana Ang MD as Hypertension Digital Medicine Responsible Provider (Family Medicine)  Medicare Shared Savings Program as Hypertension Digital Medicine Contract  Has the patient seen any provider outside of the [facility] network since the last visit? (yes). If yes, HIPPA forms completed and records requested.        Visit Type:a scheduled routine follow-up visit    Chief Complaint:  Chief Complaint   Patient presents with    Back Pain     Stomach issues and sinus issues        History of Present Illness:  Patient here with urgent visit    C/o back pain, stomach pain  She started with Ear ache first, 5 days ago, Then bodyaches.  She did have assocatied diarrhea.  She is taking Immodium.   She has a grandchild that is ill  No fever    Took Tylenol Sinus.  She has fatigue    She has history of ovarian cancer with resection, peritoneal carincomatosis.  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    Lipid good in Jan.  CMP fine    She also reports  more concentration issues.  Feels maybe she has ADHD  Noted as child, seems to be worse post chemo.  Working [employer]. Hard to focus.      History:  Past Medical History:   Diagnosis Date    Anemia     Anxiety     Arthritis     knees    Cancer     ovarian    CHF (congestive heart failure)     Hx antineoplastic chemotherapy     last 2019    Hyperlipemia     Hypertension     Neck pain     Ovarian cancer 2019    CHEMO    Peritoneal carcinomatosis 2019     Past Surgical History:   Procedure Laterality Date    breast reduction  10/02/2018     SECTION      X 1    COLONOSCOPY N/A 2021    Procedure: COLONOSCOPY;  Surgeon: Paulette Rojas MD;  Location: [facility] ENDO;  Service: Gastroenterology;  Laterality: N/A;    CYSTOSCOPY W/ URETERAL STENT PLACEMENT Right 2019    Procedure: CYSTOSCOPY, WITH URETERAL STENT INSERTION;  Surgeon: Timmy Santiago IV, MD;  Location: [facility] OR;  Service: Urology;  Laterality: Right;    CYSTOSCOPY W/ URETERAL STENT REMOVAL  10/04/2019    DILATION AND CURETTAGE OF UTERUS      HYSTERECTOMY      RALH for fibroids (still has ovaries)    INSERTION OF VENOUS ACCESS PORT Left 2019    Procedure: INSERTION, VENOUS ACCESS PORT;  Surgeon: Ulisses Monzon MD;  Location: [facility] OR;  Service: General;  Laterality: Left;  Left internal jugular     LYSIS OF ADHESIONS OF URETER N/A 8/15/2019    Procedure: URETEROLYSIS;  Surgeon: Ismael Juarez MD;  Location: [facility] OR;  Service: OB/GYN;  Laterality: N/A;    OMENTECTOMY N/A 8/15/2019    Procedure: OMENTECTOMY;  Surgeon: Ismael Juarez MD;  Location: [facility] OR;  Service: OB/GYN;  Laterality: N/A;    RETROGRADE PYELOGRAPHY Right 2019    Procedure: PYELOGRAM, RETROGRADE;  Surgeon: Timmy Santiago IV, MD;  Location: [facility] OR;  Service: Urology;  Laterality: Right;    ROBOT-ASSISTED LAPAROSCOPIC SALPINGO-OOPHORECTOMY USING DA TIA XI Bilateral 8/15/2019    Procedure: XI ROBOTIC  SALPINGO-OOPHORECTOMY;  Surgeon: Ismael Juarez MD;  Location: [facility];  Service: OB/GYN;  Laterality: Bilateral;    TOTAL REDUCTION MAMMOPLASTY  2018    TUBAL LIGATION       Family History   Problem Relation Age of Onset    Anesthesia problems Other     Breast cancer Maternal Aunt     Breast cancer Paternal Aunt     Ovarian cancer Paternal Aunt     Colon cancer Brother     Thrombophilia Neg Hx      Social History     Socioeconomic History    Marital status:    Tobacco Use    Smoking status: Former Smoker     Packs/day: 1.00     Years: 25.00     Pack years: 25.00     Quit date: 10/1/2018     Years since quitting: 3.4    Smokeless tobacco: Never Used    Tobacco comment: States started quit 2 months ago after 30 years   Substance and Sexual Activity    Alcohol use: Never     Alcohol/week: 0.0 standard drinks     Comment: occasionally  No alcohol 72h prior to sx    Drug use: No    Sexual activity: Not Currently     Partners: Male     Comment: hyst; mut monog   Social History Narrative    Live w/ spouse and 2 dogs      Patient Active Problem List   Diagnosis    Hypertension    Osteoarthritis of both knees    History of CHF (congestive heart failure)    Hyperlipidemia    Peritoneal carcinomatosis    Morbid obesity    Status post placement of ureteral stent    Abnormal ECG    Pulmonary HTN    Ovarian cancer, bilateral    S/P BSO (bilateral salpingo-oophorectomy)    Encounter for antineoplastic chemotherapy    Anxiety    DDD (degenerative disc disease), cervical    Neck muscle weakness    Family history of colon cancer     Review of patient's allergies indicates:  No Known Allergies    The following were reviewed at this visit: active problem list, medication list, allergies, family history, social history, and health maintenance.    Medications:  Current Outpatient Medications on File Prior to Visit   Medication Sig Dispense Refill    albuterol 90 mcg/actuation inhaler Inhale 2 puffs  into the lungs every 4 (four) hours as needed for Wheezing. Rescue 18 g 0    ALPRAZolam (XANAX) 0.25 MG tablet Take 1 tablet (0.25 mg total) by mouth 3 (three) times daily as needed for Anxiety. 60 tablet 2    amLODIPine (NORVASC) 5 MG tablet Take 2 tablets (10 mg total) by mouth once daily. 60 tablet 11    atenoloL (TENORMIN) 25 MG tablet Take 1 tablet (25 mg total) by mouth once daily. 30 tablet 11    biotin 1 mg tablet Take 1,000 mcg by mouth once daily.       cetirizine (ZYRTEC) 10 MG tablet Take 1 tablet (10 mg total) by mouth once daily. 30 tablet 5    EPINEPHrine (EPIPEN) 0.3 mg/0.3 mL AtIn Inject 0.3 mLs (0.3 mg total) into the muscle once. for 1 dose 2 each 0    fluticasone propionate (FLONASE) 50 mcg/actuation nasal spray 1 spray (50 mcg total) by Each Nostril route every 12 (twelve) hours as needed for Rhinitis. 16 g 0    gabapentin (NEURONTIN) 100 MG capsule Take 1 capsule (100 mg total) by mouth 3 (three) times daily. 90 capsule 11    ginkgo biloba 40 mg Tab Take 40 mg by mouth.      hydrOXYzine HCL (ATARAX) 25 MG tablet Take 1 tablet (25 mg total) by mouth 3 (three) times daily as needed for Itching. 90 tablet 1    multivitamin with minerals tablet Take 1 tablet by mouth once daily.       olmesartan-hydrochlorothiazide (BENICAR HCT) 40-25 mg per tablet Take 1 tablet by mouth once daily. 90 tablet 1    polyethylene glycol (GLYCOLAX) 17 gram/dose powder Take 17 g by mouth daily as needed (constipation). 510 g 0    rosuvastatin (CRESTOR) 10 MG tablet Take 1 tablet (10 mg total) by mouth once daily. 90 tablet 1    sertraline (ZOLOFT) 25 MG tablet Take 1 tablet (25 mg total) by mouth once daily. 30 tablet 11    sumatriptan (IMITREX) 50 MG tablet Take 1 tablet (50 mg total) by mouth every 4 to 6 hours as needed for Migraine. 30 tablet 1    zinc gluconate 50 mg tablet Take 50 mg by mouth once daily.      diclofenac sodium (VOLTAREN) 1 % Gel Apply once a day to back as needed (Patient not  taking: Reported on 3/30/2022) 100 g 1     No current facility-administered medications on file prior to visit.       Medications have been reviewed and reconciled with patient at this visit.  Barriers to medications reviewed with patient.    Adverse reactions to current medications reviewed with patient..    Over the counter medications reviewed and reconciled with patient.    Exam:  Wt Readings from Last 3 Encounters:   03/30/22 113.6 kg (250 lb 7.1 oz)   01/31/22 115.2 kg (254 lb)   12/13/21 113.2 kg (249 lb 9 oz)     Temp Readings from Last 3 Encounters:   03/30/22 97.9 °F (36.6 °C) (Temporal)   12/13/21 97 °F (36.1 °C) (Tympanic)   01/06/21 97.2 °F (36.2 °C) (Temporal)     BP Readings from Last 3 Encounters:   03/30/22 118/70   01/31/22 138/65   12/13/21 118/72     Pulse Readings from Last 3 Encounters:   03/30/22 95   01/31/22 88   12/13/21 81     Body mass index is 39.22 kg/m².      Review of Systems   Constitutional: Negative.  Negative for chills and fever.   HENT: Negative.  Negative for congestion, sinus pain and sore throat.    Eyes: Negative for blurred vision and double vision.   Respiratory: Negative for cough, sputum production, shortness of breath and wheezing.    Cardiovascular: Negative for chest pain, palpitations and leg swelling.   Gastrointestinal: Positive for diarrhea. Negative for abdominal pain, constipation, heartburn, nausea and vomiting.   Genitourinary: Negative.    Musculoskeletal: Positive for back pain.   Skin: Negative.  Negative for rash.   Neurological: Negative.    Endo/Heme/Allergies: Negative.  Negative for polydipsia. Does not bruise/bleed easily.   Psychiatric/Behavioral: Negative for depression and substance abuse.     Physical Exam  Nursing note reviewed.   Cardiovascular:      Rate and Rhythm: Normal rate and regular rhythm.   Pulmonary:      Effort: Pulmonary effort is normal. No respiratory distress.   Neurological:      Mental Status: She is alert and oriented to  person, place, and time.   Psychiatric:         Mood and Affect: Mood normal.         Behavior: Behavior normal.         Thought Content: Thought content normal.         Judgment: Judgment normal.         Laboratory Reviewed ({Yes)  Lab Results   Component Value Date    WBC 8.03 01/31/2022    HGB 11.7 (L) 01/31/2022    HCT 38.2 01/31/2022     01/31/2022    CHOL 163 01/06/2022    TRIG 71 01/06/2022    HDL 51 01/06/2022    ALT 31 01/06/2022    AST 25 01/06/2022     01/06/2022     01/06/2022    K 3.7 01/06/2022    K 3.7 01/06/2022     01/06/2022     01/06/2022    CREATININE 0.8 01/06/2022    CREATININE 0.8 01/06/2022    BUN 14 01/06/2022    BUN 14 01/06/2022    CO2 24 01/06/2022    CO2 24 01/06/2022    TSH 1.869 04/19/2013    INR 1.0 01/28/2019       Casie was seen today for back pain.    Diagnoses and all orders for this visit:    Nasal congestion  -     POCT COVID-19 Rapid Screening  -     POCT Influenza A/B    S/P BSO (bilateral salpingo-oophorectomy)  Ovarian cancer, bilateral    Primary hypertension  Echo reviewed  Mixed hyperlipidemia  Statin  LDL at goal  Pulmonary HTN  Echo  Morbid obesity  Diet and exercise  Otalgia, unspecified laterality  -     POCT COVID-19 Rapid Screening  -     POCT Influenza A/B    Screening mammogram, encounter for  -     Mammo Digital Screening Bilat; Future    Concentration deficit  -     Ambulatory referral/consult to Adult Neuropsychology; Future      Neg Flu and COVID    Tx as sinusitis if not better by weekend, start Amoxil.      CBC  due- scheudle with Gyn Onc        Care Plan/Goals: Reviewed    Goals        Patient Stated      Blood Pressure < 130/80 (pt-stated)        Other      Exercise at least 150 minutes per week.       Maintain a low sodium diet       American Heart Association (AHA) guidelines recommend less than 1500 mg of dietary salt per day.          Take at least one BP reading per week at various times of the day            Follow up: No follow-ups on file.    After visit summary was printed and given to patient upon discharge today.  Patient goals and care plan are included in After Visit Summary.

## 2022-04-06 ENCOUNTER — HOSPITAL ENCOUNTER (OUTPATIENT)
Dept: RADIOLOGY | Facility: HOSPITAL | Age: 67
Discharge: HOME | End: 2022-04-06
Attending: FAMILY MEDICINE
Payer: MEDICARE

## 2022-04-06 DIAGNOSIS — Z12.31 SCREENING MAMMOGRAM, ENCOUNTER FOR: ICD-10-CM

## 2022-04-06 PROCEDURE — 77063 BREAST TOMOSYNTHESIS BI: CPT | Mod: 26,,, | Performed by: RADIOLOGY

## 2022-04-06 PROCEDURE — 77067 SCR MAMMO BI INCL CAD: CPT | Mod: 26,,, | Performed by: RADIOLOGY

## 2022-04-06 PROCEDURE — 77063 MAMMO DIGITAL SCREENING BILAT WITH TOMO: ICD-10-PCS | Mod: 26,,, | Performed by: RADIOLOGY

## 2022-04-06 PROCEDURE — 77063 BREAST TOMOSYNTHESIS BI: CPT | Mod: TC

## 2022-04-06 PROCEDURE — 77067 MAMMO DIGITAL SCREENING BILAT WITH TOMO: ICD-10-PCS | Mod: 26,,, | Performed by: RADIOLOGY

## 2022-04-14 ENCOUNTER — OFFICE VISIT (OUTPATIENT)
Dept: OTOLARYNGOLOGY | Facility: CLINIC | Age: 67
End: 2022-04-14
Payer: MEDICARE

## 2022-04-14 ENCOUNTER — CLINICAL SUPPORT (OUTPATIENT)
Dept: AUDIOLOGY | Facility: CLINIC | Age: 67
End: 2022-04-14
Payer: MEDICARE

## 2022-04-14 ENCOUNTER — TELEPHONE (OUTPATIENT)
Dept: OTOLARYNGOLOGY | Facility: CLINIC | Age: 67
End: 2022-04-14
Payer: MEDICARE

## 2022-04-14 VITALS — TEMPERATURE: 98 F | SYSTOLIC BLOOD PRESSURE: 126 MMHG | DIASTOLIC BLOOD PRESSURE: 71 MMHG | HEART RATE: 74 BPM

## 2022-04-14 DIAGNOSIS — H61.23 BILATERAL IMPACTED CERUMEN: ICD-10-CM

## 2022-04-14 DIAGNOSIS — H90.A32 MIXED CONDUCTIVE AND SENSORINEURAL HEARING LOSS OF LEFT EAR WITH RESTRICTED HEARING OF RIGHT EAR: Primary | ICD-10-CM

## 2022-04-14 DIAGNOSIS — H90.72 MIXED CONDUCTIVE AND SENSORINEURAL HEARING LOSS OF LEFT EAR WITH UNRESTRICTED HEARING OF RIGHT EAR: Primary | ICD-10-CM

## 2022-04-14 DIAGNOSIS — H69.93 DYSFUNCTION OF BOTH EUSTACHIAN TUBES: ICD-10-CM

## 2022-04-14 DIAGNOSIS — H65.32 CHRONIC MUCOID OTITIS MEDIA OF LEFT EAR: ICD-10-CM

## 2022-04-14 PROCEDURE — 92567 PR TYMPA2METRY: ICD-10-PCS | Mod: S$GLB,,,

## 2022-04-14 PROCEDURE — 3078F PR MOST RECENT DIASTOLIC BLOOD PRESSURE < 80 MM HG: ICD-10-PCS | Mod: CPTII,S$GLB,, | Performed by: OTOLARYNGOLOGY

## 2022-04-14 PROCEDURE — 92557 COMPREHENSIVE HEARING TEST: CPT | Mod: S$GLB,,,

## 2022-04-14 PROCEDURE — 1159F PR MEDICATION LIST DOCUMENTED IN MEDICAL RECORD: ICD-10-PCS | Mod: CPTII,S$GLB,, | Performed by: OTOLARYNGOLOGY

## 2022-04-14 PROCEDURE — 3074F SYST BP LT 130 MM HG: CPT | Mod: CPTII,S$GLB,, | Performed by: OTOLARYNGOLOGY

## 2022-04-14 PROCEDURE — 99999 PR PBB SHADOW E&M-EST. PATIENT-LVL IV: ICD-10-PCS | Mod: PBBFAC,,, | Performed by: OTOLARYNGOLOGY

## 2022-04-14 PROCEDURE — 92557 PR COMPREHENSIVE HEARING TEST: ICD-10-PCS | Mod: S$GLB,,,

## 2022-04-14 PROCEDURE — 69210 PR REMOVAL IMPACTED CERUMEN REQUIRING INSTRUMENTATION, UNILATERAL: ICD-10-PCS | Mod: S$GLB,,, | Performed by: OTOLARYNGOLOGY

## 2022-04-14 PROCEDURE — 1159F MED LIST DOCD IN RCRD: CPT | Mod: CPTII,S$GLB,, | Performed by: OTOLARYNGOLOGY

## 2022-04-14 PROCEDURE — 69210 REMOVE IMPACTED EAR WAX UNI: CPT | Mod: S$GLB,,, | Performed by: OTOLARYNGOLOGY

## 2022-04-14 PROCEDURE — 1125F PR PAIN SEVERITY QUANTIFIED, PAIN PRESENT: ICD-10-PCS | Mod: CPTII,S$GLB,, | Performed by: OTOLARYNGOLOGY

## 2022-04-14 PROCEDURE — 3078F DIAST BP <80 MM HG: CPT | Mod: CPTII,S$GLB,, | Performed by: OTOLARYNGOLOGY

## 2022-04-14 PROCEDURE — 99204 OFFICE O/P NEW MOD 45 MIN: CPT | Mod: 25,S$GLB,, | Performed by: OTOLARYNGOLOGY

## 2022-04-14 PROCEDURE — 1125F AMNT PAIN NOTED PAIN PRSNT: CPT | Mod: CPTII,S$GLB,, | Performed by: OTOLARYNGOLOGY

## 2022-04-14 PROCEDURE — 99999 PR PBB SHADOW E&M-EST. PATIENT-LVL IV: CPT | Mod: PBBFAC,,, | Performed by: OTOLARYNGOLOGY

## 2022-04-14 PROCEDURE — 99204 PR OFFICE/OUTPT VISIT, NEW, LEVL IV, 45-59 MIN: ICD-10-PCS | Mod: 25,S$GLB,, | Performed by: OTOLARYNGOLOGY

## 2022-04-14 PROCEDURE — 3074F PR MOST RECENT SYSTOLIC BLOOD PRESSURE < 130 MM HG: ICD-10-PCS | Mod: CPTII,S$GLB,, | Performed by: OTOLARYNGOLOGY

## 2022-04-14 PROCEDURE — 92567 TYMPANOMETRY: CPT | Mod: S$GLB,,,

## 2022-04-14 NOTE — PROGRESS NOTES
Referring Provider: MD Casie Bolaños Rodriguez Gaines was seen 04/14/2022 for an audiological evaluation. Patient complains of muffled hearing in her left ear from a sinus infection for the past 4 weeks. Patient noted intermittent difficulties with balance. Patient denied tinnitus, history of ear surgeries, history of noise exposure, and family history of hearing loss.    Otoscopy revealed non-occluding cerumen with visualization of the tympanic membrane in both ears. Tympanograms were Type A for the right ear and Type B for the left ear. Audiometry revealed a borderline normal-to-mild sensorineural hearing loss for the right ear, and a moderate-to-severe mixed hearing loss for the left ear. Speech Reception Thresholds were  30 dBHL for the right ear and 60 dBHL for the left ear. Word recognition scores were excellent for the right ear and good for the left ear.    Patient was counseled on the above findings.    Recommendations:  1. Follow-up with ENT, as scheduled.  2. Repeat audiological evaluation per ENT, or sooner if needed.

## 2022-04-18 NOTE — PROGRESS NOTES
Referring Provider:    Aaareferral Self  No address on file  Subjective:   Patient: Casie Frias Givens [id number], :1955   Visit date:2022 1:14 PM    Chief Complaint:  Ear Fullness (Pt states sound in left ear is muffled, finished rd of amoxicillin )    HPI:    Prior notes reviewed by myself.  Clinical documentation obtained by nursing staff reviewed.     66-year-old female presents with complaints of bilateral ear fullness, left greater than right.  She was seen by primary care physician reportedly noticed some possible middle ear effusion and treated with amoxicillin.  Her symptoms have not responded to treatment.  She has no prior significant otologic history of surgery, trauma, loud noise exposure.  She did have an audiogram today.  No recent imaging.      Objective:     Physical Exam:  Vitals:  /71   Pulse 74   Temp 97.9 °F (36.6 °C) (Temporal)   General appearance:  Well developed, well nourished    Ears:  Otoscopy of external auditory canals and tympanic membranes was significant for bilateral 100% cerumen impaction and left middle ear effusion, clinical speech reception thresholds grossly intact, no mass/lesion of auricle.    Nose:  No masses/lesions of external nose, nasal mucosa, septum, and turbinates were within normal limits.    Mouth:  No mass/lesion of lips, teeth, gums, hard/soft palate, tongue, tonsils, or oropharynx.    Neck & Lymphatics:  No cervical lymphadenopathy, no neck mass/crepitus/ asymmetry, trachea is midline, no thyroid enlargement/tenderness/mass.        [x]  Data Reviewed:    Lab Results   Component Value Date    WBC 8.03 2022    HGB 11.7 (L) 2022    HCT 38.2 2022    MCV 92 2022    EOSINOPHIL 2.1 2022         [x]  Independent interpretation of test: Mixed HL As with type B tymp As          Clinical Support    2022  [facility] - Audiology   Clarita Dempsey, CCC-A    Audiology  Mixed conductive and sensorineural hearing loss  of left ear with unrestricted hearing of right ear    Dx         Instructions     After Visit Summary (Automatic SnapShot taken 4/14/2022)      Progress Notes  Clarita Dempsey, CCC-A (Audiologist)   Audiology  Referring Provider: MD Casie Bolaños Eder was seen 04/14/2022 for an audiological evaluation. Patient complains of muffled hearing in her left ear from a sinus infection for the past 4 weeks. Patient noted intermittent difficulties with balance. Patient denied tinnitus, history of ear surgeries, history of noise exposure, and family history of hearing loss.     Otoscopy revealed non-occluding cerumen with visualization of the tympanic membrane in both ears. Tympanograms were Type A for the right ear and Type B for the left ear. Audiometry revealed a borderline normal-to-mild sensorineural hearing loss for the right ear, and a moderate-to-severe mixed hearing loss for the left ear. Speech Reception Thresholds were  30 dBHL for the right ear and 60 dBHL for the left ear. Word recognition scores were excellent for the right ear and good for the left ear.     Patient was counseled on the above findings.     Recommendations:  1. Follow-up with ENT, as scheduled.  2. Repeat audiological evaluation per ENT, or sooner if needed.                                    Additional Documentation    Encounter Info:  Billing Info,   Detailed Report,   Education,   Care Plan,   History,   Allergies,   Patient-Entered Questionnaires,   Outpatient Care Plans            Media  From this encounter  Annotation on 4/14/2022 2:54 PM by Clarita Dempsey, ANCELMO-A: AUDIOGRAM       Active Diagnosis Review (HCC)    Active Diagnosis Review (HCC)                 Not recorded                All Charges for This Encounter    Code Description Service Date Service Provider Modifiers Qty   25809 FL COMPREHENSIVE HEARING TEST 4/14/2022 Clarita Dempsey, CCC-A S$GLB 1   26996 FL [id number] 4/14/2022 Sophie Green  Au.D, CCC-A S$GLB 1       Level of Service          BestPractice Advisories    Click to view BestPractice Advisory history       AVS Reports    Date/Time Report Action User   4/14/2022  3:55 PM After Visit Summary Automatically Generated Clarita Dempsey CCC-A       Encounter-Level Documents on 04/14/2022:    After Visit Summary - Document on 4/14/2022 3:55 PM by Clarita Dempsey CCC-A: After Visit Summary              Orders Placed     None      Medication Changes       None     Medication List         Visit Diagnoses       Mixed conductive and sensorineural hearing loss of left ear with unrestricted hearing of right ear     Problem List         Procedure Note    CHIEF COMPLAINT:  Cerumen Impaction    Description:  The patient was seated in an exam chair.  An ear speculum was placed in the right EAC and was examined under the microscope.  Suction and/or loop curettes were used to remove a large cerumen impaction.  The tympanic membrane was visualized and was normal in appearance.  The procedure was repeated on the left side in a similar fashion.  The TM was intact and normal on this side as well.  The patient tolerated the procedure well.                Assessment & Plan:   Mixed conductive and sensorineural hearing loss of left ear with restricted hearing of right ear    Bilateral impacted cerumen    Dysfunction of both eustachian tubes    Chronic mucoid otitis media of left ear        Cerumen removed from both ears without incident.  Her left sided muffled hearing persisted and her audiogram demonstrated a possible mixed hearing loss with a very prominent conductive component.  We agreed to treat her eustachian tube dysfunction unilateral serous otitis media conservatively and have her return in 1 week for recheck.  If her middle ear effusion persists, she will need a nasal endoscopy.  If her asymmetric SNHL is still present on her next audio, she may need an MRI.

## 2022-04-24 ENCOUNTER — OFFICE VISIT (OUTPATIENT)
Dept: URGENT CARE | Facility: CLINIC | Age: 67
End: 2022-04-24
Payer: MEDICARE

## 2022-04-24 VITALS
BODY MASS INDEX: 39.24 KG/M2 | DIASTOLIC BLOOD PRESSURE: 64 MMHG | TEMPERATURE: 97 F | HEIGHT: 67 IN | SYSTOLIC BLOOD PRESSURE: 118 MMHG | HEART RATE: 88 BPM | RESPIRATION RATE: 18 BRPM | WEIGHT: 250 LBS | OXYGEN SATURATION: 95 %

## 2022-04-24 DIAGNOSIS — J30.9 ALLERGIC RHINITIS, UNSPECIFIED SEASONALITY, UNSPECIFIED TRIGGER: Primary | ICD-10-CM

## 2022-04-24 DIAGNOSIS — J40 BRONCHITIS: ICD-10-CM

## 2022-04-24 PROCEDURE — 3074F PR MOST RECENT SYSTOLIC BLOOD PRESSURE < 130 MM HG: ICD-10-PCS | Mod: CPTII,S$GLB,, | Performed by: EMERGENCY MEDICINE

## 2022-04-24 PROCEDURE — 1126F AMNT PAIN NOTED NONE PRSNT: CPT | Mod: CPTII,S$GLB,, | Performed by: EMERGENCY MEDICINE

## 2022-04-24 PROCEDURE — 1159F PR MEDICATION LIST DOCUMENTED IN MEDICAL RECORD: ICD-10-PCS | Mod: CPTII,S$GLB,, | Performed by: EMERGENCY MEDICINE

## 2022-04-24 PROCEDURE — 99213 OFFICE O/P EST LOW 20 MIN: CPT | Mod: S$GLB,,, | Performed by: EMERGENCY MEDICINE

## 2022-04-24 PROCEDURE — 1160F PR REVIEW ALL MEDS BY PRESCRIBER/CLIN PHARMACIST DOCUMENTED: ICD-10-PCS | Mod: CPTII,S$GLB,, | Performed by: EMERGENCY MEDICINE

## 2022-04-24 PROCEDURE — 1159F MED LIST DOCD IN RCRD: CPT | Mod: CPTII,S$GLB,, | Performed by: EMERGENCY MEDICINE

## 2022-04-24 PROCEDURE — 3074F SYST BP LT 130 MM HG: CPT | Mod: CPTII,S$GLB,, | Performed by: EMERGENCY MEDICINE

## 2022-04-24 PROCEDURE — 3008F PR BODY MASS INDEX (BMI) DOCUMENTED: ICD-10-PCS | Mod: CPTII,S$GLB,, | Performed by: EMERGENCY MEDICINE

## 2022-04-24 PROCEDURE — 3078F PR MOST RECENT DIASTOLIC BLOOD PRESSURE < 80 MM HG: ICD-10-PCS | Mod: CPTII,S$GLB,, | Performed by: EMERGENCY MEDICINE

## 2022-04-24 PROCEDURE — 99213 PR OFFICE/OUTPT VISIT, EST, LEVL III, 20-29 MIN: ICD-10-PCS | Mod: S$GLB,,, | Performed by: EMERGENCY MEDICINE

## 2022-04-24 PROCEDURE — 1160F RVW MEDS BY RX/DR IN RCRD: CPT | Mod: CPTII,S$GLB,, | Performed by: EMERGENCY MEDICINE

## 2022-04-24 PROCEDURE — 3008F BODY MASS INDEX DOCD: CPT | Mod: CPTII,S$GLB,, | Performed by: EMERGENCY MEDICINE

## 2022-04-24 PROCEDURE — 3078F DIAST BP <80 MM HG: CPT | Mod: CPTII,S$GLB,, | Performed by: EMERGENCY MEDICINE

## 2022-04-24 PROCEDURE — 1126F PR PAIN SEVERITY QUANTIFIED, NO PAIN PRESENT: ICD-10-PCS | Mod: CPTII,S$GLB,, | Performed by: EMERGENCY MEDICINE

## 2022-04-24 RX ORDER — MONTELUKAST SODIUM 10 MG/1
10 TABLET ORAL NIGHTLY
Qty: 30 TABLET | Refills: 2 | Status: SHIPPED | OUTPATIENT
Start: 2022-04-24 | End: 2022-07-23

## 2022-04-24 RX ORDER — PROMETHAZINE HYDROCHLORIDE AND DEXTROMETHORPHAN HYDROBROMIDE 6.25; 15 MG/5ML; MG/5ML
5 SYRUP ORAL EVERY 4 HOURS PRN
Qty: 180 ML | Refills: 0 | Status: SHIPPED | OUTPATIENT
Start: 2022-04-24 | End: 2022-05-04

## 2022-04-24 RX ORDER — AZITHROMYCIN 250 MG/1
TABLET, FILM COATED ORAL
Qty: 6 TABLET | Refills: 0 | Status: SHIPPED | OUTPATIENT
Start: 2022-04-24 | End: 2022-12-12

## 2022-04-24 RX ORDER — AZELASTINE 1 MG/ML
1 SPRAY, METERED NASAL 2 TIMES DAILY
Qty: 30 ML | Refills: 0 | Status: SHIPPED | OUTPATIENT
Start: 2022-04-24 | End: 2023-12-01

## 2022-04-24 NOTE — PATIENT INSTRUCTIONS
Zithromax as prescribed.  Phenergan DM as prescribed for cough.  This medication is sedating.  Use with caution.  Start Singulair daily.  Start Astelin twice a day.  This is a nasal spray.  Continue to use generic Flonase spray:  2 sprays per nostril up to 3 times a day depending on the level of congestion.  However, use it at least once a day.  Continue your antihistamine.  Nasal saline:  2 drops per nostril 10 to 15 times a day to help with postnasal drip.

## 2022-04-24 NOTE — PROGRESS NOTES
"Subjective:       Patient ID: Casie Gaines is a 66 y.o. female.    Vitals:  height is 5' 7" (1.702 m) and weight is 113.4 kg (250 lb). Her temperature is 97 °F (36.1 °C). Her blood pressure is 118/64 and her pulse is 88. Her respiration is 18 and oxygen saturation is 95%.     Chief Complaint: Cough    It 66-year-old female presents to urgent care for evaluation of worsening sinus symptoms.  She has a cough productive of green sputum and has had fever at night the last couple of nights.  Cough is disruptive to her sleep.  Patient reports severe sinus pressure, postnasal drip.  Onset of her sinus symptoms about a month ago but just recently started having fever and chills Pt states she has been using nasal spray and steam to try to open up her nasal passages.  Also taking an antihistamine    Cough  This is a recurrent problem. The current episode started 1 to 4 weeks ago. The problem has been unchanged. The problem occurs every few hours. The cough is productive of sputum. Associated symptoms include chills, a fever and headaches. Pertinent negatives include no chest pain, ear congestion, ear pain, heartburn, hemoptysis, myalgias, nasal congestion, postnasal drip, rash, rhinorrhea, sore throat, shortness of breath, sweats, weight loss or wheezing. Nothing aggravates the symptoms. She has tried nothing for the symptoms. The treatment provided no relief. There is no history of asthma, bronchiectasis, bronchitis, COPD, emphysema, environmental allergies or pneumonia.       Constitution: Positive for chills and fever.   HENT: Negative for ear pain, postnasal drip and sore throat.    Cardiovascular: Negative for chest pain.   Respiratory: Positive for cough. Negative for bloody sputum, shortness of breath and wheezing.    Gastrointestinal: Negative for heartburn.   Musculoskeletal: Negative for muscle ache.   Skin: Negative for rash.   Allergic/Immunologic: Negative for environmental allergies.   Neurological: " Positive for headaches.       Objective:      Physical Exam   Constitutional: She is oriented to person, place, and time. She appears well-developed. She is cooperative.  Non-toxic appearance. She does not appear ill. No distress.   HENT:   Head: Normocephalic and atraumatic.   Ears:   Right Ear: Hearing and external ear normal.   Left Ear: Hearing, tympanic membrane and external ear normal.   Nose: Congestion present. No mucosal edema, rhinorrhea or nasal deformity. No epistaxis. Right sinus exhibits no maxillary sinus tenderness and no frontal sinus tenderness. Left sinus exhibits no maxillary sinus tenderness and no frontal sinus tenderness.      Comments: Nasal mucosa is pale and congested.  No sinus tenderness to percussion    Mouth/Throat: Uvula is midline and mucous membranes are normal. No trismus in the jaw. Normal dentition. No uvula swelling. No oropharyngeal exudate, posterior oropharyngeal edema or posterior oropharyngeal erythema.      Comments: +PND, cobblestoning    Eyes: Conjunctivae and lids are normal. No scleral icterus.      extraocular movement intact   Neck: Trachea normal and phonation normal. Neck supple. No edema present. No erythema present. No neck rigidity present.   Cardiovascular: Normal rate, regular rhythm, normal heart sounds and normal pulses.   Pulmonary/Chest: Effort normal and breath sounds normal. No respiratory distress. She has no decreased breath sounds. She has no wheezes. She has no rhonchi.   Abdominal: Normal appearance.   Musculoskeletal: Normal range of motion.         General: No deformity. Normal range of motion.   Neurological: She is alert and oriented to person, place, and time. She exhibits normal muscle tone. Coordination normal.   Skin: Skin is warm, dry, intact, not diaphoretic and not pale.   Psychiatric: Her speech is normal and behavior is normal. Judgment and thought content normal.   Nursing note and vitals reviewed.        Assessment:       1. Allergic  rhinitis, unspecified seasonality, unspecified trigger    2. Bronchitis          Plan:         Allergic rhinitis, unspecified seasonality, unspecified trigger  -     montelukast (SINGULAIR) 10 mg tablet; Take 1 tablet (10 mg total) by mouth every evening.  Dispense: 30 tablet; Refill: 2  -     azelastine (ASTELIN) 137 mcg (0.1 %) nasal spray; 1 spray (137 mcg total) by Nasal route 2 (two) times daily.  Dispense: 30 mL; Refill: 0    Bronchitis  -     promethazine-dextromethorphan (PROMETHAZINE-DM) 6.25-15 mg/5 mL Syrp; Take 5 mLs by mouth every 4 (four) hours as needed (cough).  Dispense: 180 mL; Refill: 0  -     azithromycin (Z-ROSE) 250 MG tablet; Take 2 tablets by mouth on day 1; Take 1 tablet by mouth on days 2-5  Dispense: 6 tablet; Refill: 0

## 2022-04-27 ENCOUNTER — TELEPHONE (OUTPATIENT)
Dept: URGENT CARE | Facility: CLINIC | Age: 67
End: 2022-04-27
Payer: MEDICARE

## 2022-04-27 NOTE — TELEPHONE ENCOUNTER
Angelita Jameson, spoke with pt she states that she is feel a lot better and was very pleased with her service.

## 2022-05-09 ENCOUNTER — OFFICE VISIT (OUTPATIENT)
Dept: GYNECOLOGIC ONCOLOGY | Facility: CLINIC | Age: 67
End: 2022-05-09
Payer: MEDICARE

## 2022-05-09 VITALS
HEART RATE: 70 BPM | BODY MASS INDEX: 39.64 KG/M2 | SYSTOLIC BLOOD PRESSURE: 109 MMHG | DIASTOLIC BLOOD PRESSURE: 52 MMHG | WEIGHT: 253.13 LBS

## 2022-05-09 DIAGNOSIS — C56.3 OVARIAN CANCER, BILATERAL: Primary | ICD-10-CM

## 2022-05-09 PROCEDURE — 99999 PR PBB SHADOW E&M-EST. PATIENT-LVL IV: ICD-10-PCS | Mod: PBBFAC,,, | Performed by: OBSTETRICS & GYNECOLOGY

## 2022-05-09 PROCEDURE — 3078F PR MOST RECENT DIASTOLIC BLOOD PRESSURE < 80 MM HG: ICD-10-PCS | Mod: CPTII,S$GLB,, | Performed by: OBSTETRICS & GYNECOLOGY

## 2022-05-09 PROCEDURE — 3078F DIAST BP <80 MM HG: CPT | Mod: CPTII,S$GLB,, | Performed by: OBSTETRICS & GYNECOLOGY

## 2022-05-09 PROCEDURE — 3008F PR BODY MASS INDEX (BMI) DOCUMENTED: ICD-10-PCS | Mod: CPTII,S$GLB,, | Performed by: OBSTETRICS & GYNECOLOGY

## 2022-05-09 PROCEDURE — 99214 PR OFFICE/OUTPT VISIT, EST, LEVL IV, 30-39 MIN: ICD-10-PCS | Mod: S$GLB,,, | Performed by: OBSTETRICS & GYNECOLOGY

## 2022-05-09 PROCEDURE — 1101F PT FALLS ASSESS-DOCD LE1/YR: CPT | Mod: CPTII,S$GLB,, | Performed by: OBSTETRICS & GYNECOLOGY

## 2022-05-09 PROCEDURE — 3288F FALL RISK ASSESSMENT DOCD: CPT | Mod: CPTII,S$GLB,, | Performed by: OBSTETRICS & GYNECOLOGY

## 2022-05-09 PROCEDURE — 1160F RVW MEDS BY RX/DR IN RCRD: CPT | Mod: CPTII,S$GLB,, | Performed by: OBSTETRICS & GYNECOLOGY

## 2022-05-09 PROCEDURE — 1159F MED LIST DOCD IN RCRD: CPT | Mod: CPTII,S$GLB,, | Performed by: OBSTETRICS & GYNECOLOGY

## 2022-05-09 PROCEDURE — 1126F PR PAIN SEVERITY QUANTIFIED, NO PAIN PRESENT: ICD-10-PCS | Mod: CPTII,S$GLB,, | Performed by: OBSTETRICS & GYNECOLOGY

## 2022-05-09 PROCEDURE — 1159F PR MEDICATION LIST DOCUMENTED IN MEDICAL RECORD: ICD-10-PCS | Mod: CPTII,S$GLB,, | Performed by: OBSTETRICS & GYNECOLOGY

## 2022-05-09 PROCEDURE — 1101F PR PT FALLS ASSESS DOC 0-1 FALLS W/OUT INJ PAST YR: ICD-10-PCS | Mod: CPTII,S$GLB,, | Performed by: OBSTETRICS & GYNECOLOGY

## 2022-05-09 PROCEDURE — 3074F PR MOST RECENT SYSTOLIC BLOOD PRESSURE < 130 MM HG: ICD-10-PCS | Mod: CPTII,S$GLB,, | Performed by: OBSTETRICS & GYNECOLOGY

## 2022-05-09 PROCEDURE — 1160F PR REVIEW ALL MEDS BY PRESCRIBER/CLIN PHARMACIST DOCUMENTED: ICD-10-PCS | Mod: CPTII,S$GLB,, | Performed by: OBSTETRICS & GYNECOLOGY

## 2022-05-09 PROCEDURE — 99214 OFFICE O/P EST MOD 30 MIN: CPT | Mod: S$GLB,,, | Performed by: OBSTETRICS & GYNECOLOGY

## 2022-05-09 PROCEDURE — 3008F BODY MASS INDEX DOCD: CPT | Mod: CPTII,S$GLB,, | Performed by: OBSTETRICS & GYNECOLOGY

## 2022-05-09 PROCEDURE — 1126F AMNT PAIN NOTED NONE PRSNT: CPT | Mod: CPTII,S$GLB,, | Performed by: OBSTETRICS & GYNECOLOGY

## 2022-05-09 PROCEDURE — 3288F PR FALLS RISK ASSESSMENT DOCUMENTED: ICD-10-PCS | Mod: CPTII,S$GLB,, | Performed by: OBSTETRICS & GYNECOLOGY

## 2022-05-09 PROCEDURE — 3074F SYST BP LT 130 MM HG: CPT | Mod: CPTII,S$GLB,, | Performed by: OBSTETRICS & GYNECOLOGY

## 2022-05-09 PROCEDURE — 99999 PR PBB SHADOW E&M-EST. PATIENT-LVL IV: CPT | Mod: PBBFAC,,, | Performed by: OBSTETRICS & GYNECOLOGY

## 2022-05-09 NOTE — PROGRESS NOTES
Subjective:      Patient ID: Casie Gaines is a 66 y.o. female.    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     Here today for continued surveillance.  CA-125 last visit was stable at 5 in January, pending today.  No new symptoms.  Denies F/C, N/V, VB.  Has had normal bladder and bowel function.  Review of Systems   Constitutional: Negative for activity change, appetite change, chills, fatigue and fever.   HENT: Negative for hearing loss, mouth sores, nosebleeds, sore throat and tinnitus.    Eyes: Negative for visual disturbance.   Respiratory: Negative for cough, chest tightness, shortness of breath and wheezing.    Cardiovascular: Negative for chest pain and leg swelling.   Gastrointestinal: Negative for abdominal distention, abdominal pain, blood in stool, constipation, diarrhea, nausea and vomiting.   Genitourinary: Negative for dysuria, flank pain, frequency, hematuria, pelvic pain, vaginal bleeding, vaginal discharge and vaginal pain.   Musculoskeletal: Negative for arthralgias and back pain.   Skin: Negative for rash.   Neurological: Negative for dizziness, seizures, syncope, weakness and numbness.   Hematological: Does not bruise/bleed easily.   Psychiatric/Behavioral: Negative for confusion and sleep disturbance. The patient is not nervous/anxious.        Objective:   Physical Exam:   Constitutional: She appears  well-developed and well-nourished. No distress.    HENT:   Head: Normocephalic and atraumatic.    Eyes: No scleral icterus.     Cardiovascular: Normal rate and intact distal pulses.  Exam reveals no cyanosis and no edema.     Pulmonary/Chest: Effort normal. No respiratory distress. She exhibits no tenderness.        Abdominal: Soft. She exhibits no distension (incisions healing well), no fluid wave and no mass. There is no abdominal tenderness. There is no rebound and no guarding. No hernia.     Genitourinary:    Vagina and rectum normal.      Pelvic exam was performed with patient supine.   Labial bartholins normal.There is no rash, tenderness or lesion on the right labia. There is no rash, tenderness or lesion on the left labia. Right adnexum displays no mass, no tenderness and no fullness. Left adnexum displays no mass, no tenderness and no fullness. Vaginal cuff normal.  No  no vaginal discharge, bleeding or unspecified prolapse of vaginal walls in the vagina. Cervix is absent.Uterus is absent.              Lymphadenopathy:     She has no cervical adenopathy. No inguinal adenopathy noted on the right or left side.     Skin: No cyanosis.        Assessment:     1. Ovarian cancer, bilateral        Plan:       Patient is doing well and is CLYDE on exam. Will obtain CA-125 today.  Continue active observation and CA-125 assessment at 6 month intervals

## 2022-05-10 ENCOUNTER — LAB VISIT (OUTPATIENT)
Dept: LAB | Facility: HOSPITAL | Age: 67
End: 2022-05-10
Attending: OBSTETRICS & GYNECOLOGY
Payer: MEDICARE

## 2022-05-10 DIAGNOSIS — C56.3 OVARIAN CANCER, BILATERAL: ICD-10-CM

## 2022-05-10 LAB — CANCER AG125 SERPL-ACNC: 7 U/ML (ref 0–30)

## 2022-05-10 PROCEDURE — 86304 IMMUNOASSAY TUMOR CA 125: CPT | Performed by: OBSTETRICS & GYNECOLOGY

## 2022-05-10 PROCEDURE — 36415 COLL VENOUS BLD VENIPUNCTURE: CPT | Performed by: OBSTETRICS & GYNECOLOGY

## 2022-06-06 NOTE — TELEPHONE ENCOUNTER
No new care gaps identified.  [facility] Embedded Care Gaps. Reference number: [id number]. 6/06/2022   3:53:29 PM CDT

## 2022-06-07 RX ORDER — HYDROCHLOROTHIAZIDE 12.5 MG/1
12.5 TABLET ORAL DAILY
Qty: 30 TABLET | Refills: 11 | OUTPATIENT
Start: 2022-06-07 | End: 2023-06-07

## 2022-08-02 ENCOUNTER — PATIENT MESSAGE (OUTPATIENT)
Dept: ADMINISTRATIVE | Facility: OTHER | Age: 67
End: 2022-08-02
Payer: MEDICARE

## 2022-08-19 ENCOUNTER — TELEPHONE (OUTPATIENT)
Dept: GYNECOLOGIC ONCOLOGY | Facility: CLINIC | Age: 67
End: 2022-08-19
Payer: MEDICARE

## 2022-08-19 NOTE — TELEPHONE ENCOUNTER
Spoke with our patient about her insurance, reschedule appointment she voiced understanding of the date, time and location. All questions answered appointment mail. Provider Scheduling Coord.  Gynecologic Oncology MA/PAR /Preceptor Tomasz Purcell

## 2022-08-24 ENCOUNTER — TELEPHONE (OUTPATIENT)
Dept: INTERNAL MEDICINE | Facility: CLINIC | Age: 67
End: 2022-08-24
Payer: MEDICARE

## 2022-08-24 NOTE — TELEPHONE ENCOUNTER
----- Message from Chano Page sent at 8/24/2022  9:41 AM CDT -----  Contact: self  ..Type:  Sooner Apoointment Request    Caller is requesting a sooner appointment.  Caller declined first available appointment listed below.  Caller will not accept being placed on the waitlist and is requesting a message be sent to doctor.  Name of Caller:.Casie Frias Givens  When is the first available appointment? September 12  Symptoms: lower back pain /short of breath/fatigue   Would the patient rather a call back or a response via [facility]?  Call back   Best Call Back Number:.[phone] (home)   Additional Information:

## 2022-08-25 ENCOUNTER — OFFICE VISIT (OUTPATIENT)
Dept: INTERNAL MEDICINE | Facility: CLINIC | Age: 67
End: 2022-08-25
Payer: MEDICARE

## 2022-08-25 ENCOUNTER — LAB VISIT (OUTPATIENT)
Dept: LAB | Facility: HOSPITAL | Age: 67
End: 2022-08-25
Attending: FAMILY MEDICINE
Payer: MEDICARE

## 2022-08-25 VITALS
BODY MASS INDEX: 39.64 KG/M2 | SYSTOLIC BLOOD PRESSURE: 130 MMHG | DIASTOLIC BLOOD PRESSURE: 80 MMHG | TEMPERATURE: 98 F | HEART RATE: 105 BPM | WEIGHT: 252.56 LBS | HEIGHT: 67 IN | OXYGEN SATURATION: 97 %

## 2022-08-25 DIAGNOSIS — C56.3 OVARIAN CANCER, BILATERAL: ICD-10-CM

## 2022-08-25 DIAGNOSIS — M54.9 BACK PAIN, UNSPECIFIED BACK LOCATION, UNSPECIFIED BACK PAIN LATERALITY, UNSPECIFIED CHRONICITY: ICD-10-CM

## 2022-08-25 DIAGNOSIS — I10 HYPERTENSION, UNSPECIFIED TYPE: ICD-10-CM

## 2022-08-25 DIAGNOSIS — Z90.722 S/P BSO (BILATERAL SALPINGO-OOPHORECTOMY): ICD-10-CM

## 2022-08-25 DIAGNOSIS — I27.20 PULMONARY HTN: ICD-10-CM

## 2022-08-25 DIAGNOSIS — R06.02 SHORTNESS OF BREATH: ICD-10-CM

## 2022-08-25 DIAGNOSIS — M54.9 BACK PAIN, UNSPECIFIED BACK LOCATION, UNSPECIFIED BACK PAIN LATERALITY, UNSPECIFIED CHRONICITY: Primary | ICD-10-CM

## 2022-08-25 DIAGNOSIS — C56.9 MALIGNANT NEOPLASM OF OVARY, UNSPECIFIED LATERALITY: ICD-10-CM

## 2022-08-25 LAB
BILIRUB UR QL STRIP: NEGATIVE
CLARITY UR: CLEAR
COLOR UR: YELLOW
CTP QC/QA: YES
GLUCOSE UR QL STRIP: NEGATIVE
HGB UR QL STRIP: NEGATIVE
KETONES UR QL STRIP: NEGATIVE
LEUKOCYTE ESTERASE UR QL STRIP: NEGATIVE
NITRITE UR QL STRIP: NEGATIVE
PH UR STRIP: 6 [PH] (ref 5–8)
PROT UR QL STRIP: NEGATIVE
SARS-COV-2 RDRP RESP QL NAA+PROBE: NEGATIVE
SP GR UR STRIP: >1.03 (ref 1–1.03)
URN SPEC COLLECT METH UR: ABNORMAL
UROBILINOGEN UR STRIP-ACNC: NEGATIVE EU/DL

## 2022-08-25 PROCEDURE — 3288F PR FALLS RISK ASSESSMENT DOCUMENTED: ICD-10-PCS | Mod: CPTII,S$GLB,, | Performed by: FAMILY MEDICINE

## 2022-08-25 PROCEDURE — 3075F PR MOST RECENT SYSTOLIC BLOOD PRESS GE 130-139MM HG: ICD-10-PCS | Mod: CPTII,S$GLB,, | Performed by: FAMILY MEDICINE

## 2022-08-25 PROCEDURE — 3075F SYST BP GE 130 - 139MM HG: CPT | Mod: CPTII,S$GLB,, | Performed by: FAMILY MEDICINE

## 2022-08-25 PROCEDURE — 1159F PR MEDICATION LIST DOCUMENTED IN MEDICAL RECORD: ICD-10-PCS | Mod: CPTII,S$GLB,, | Performed by: FAMILY MEDICINE

## 2022-08-25 PROCEDURE — 1101F PR PT FALLS ASSESS DOC 0-1 FALLS W/OUT INJ PAST YR: ICD-10-PCS | Mod: CPTII,S$GLB,, | Performed by: FAMILY MEDICINE

## 2022-08-25 PROCEDURE — 3288F FALL RISK ASSESSMENT DOCD: CPT | Mod: CPTII,S$GLB,, | Performed by: FAMILY MEDICINE

## 2022-08-25 PROCEDURE — 99999 PR PBB SHADOW E&M-EST. PATIENT-LVL IV: ICD-10-PCS | Mod: PBBFAC,,, | Performed by: FAMILY MEDICINE

## 2022-08-25 PROCEDURE — 3079F PR MOST RECENT DIASTOLIC BLOOD PRESSURE 80-89 MM HG: ICD-10-PCS | Mod: CPTII,S$GLB,, | Performed by: FAMILY MEDICINE

## 2022-08-25 PROCEDURE — U0002: ICD-10-PCS | Mod: QW,S$GLB,, | Performed by: FAMILY MEDICINE

## 2022-08-25 PROCEDURE — 99214 OFFICE O/P EST MOD 30 MIN: CPT | Mod: S$GLB,,, | Performed by: FAMILY MEDICINE

## 2022-08-25 PROCEDURE — 1125F AMNT PAIN NOTED PAIN PRSNT: CPT | Mod: CPTII,S$GLB,, | Performed by: FAMILY MEDICINE

## 2022-08-25 PROCEDURE — 3008F BODY MASS INDEX DOCD: CPT | Mod: CPTII,S$GLB,, | Performed by: FAMILY MEDICINE

## 2022-08-25 PROCEDURE — 1159F MED LIST DOCD IN RCRD: CPT | Mod: CPTII,S$GLB,, | Performed by: FAMILY MEDICINE

## 2022-08-25 PROCEDURE — 1101F PT FALLS ASSESS-DOCD LE1/YR: CPT | Mod: CPTII,S$GLB,, | Performed by: FAMILY MEDICINE

## 2022-08-25 PROCEDURE — 1125F PR PAIN SEVERITY QUANTIFIED, PAIN PRESENT: ICD-10-PCS | Mod: CPTII,S$GLB,, | Performed by: FAMILY MEDICINE

## 2022-08-25 PROCEDURE — 3079F DIAST BP 80-89 MM HG: CPT | Mod: CPTII,S$GLB,, | Performed by: FAMILY MEDICINE

## 2022-08-25 PROCEDURE — 81003 URINALYSIS AUTO W/O SCOPE: CPT | Performed by: FAMILY MEDICINE

## 2022-08-25 PROCEDURE — 99214 PR OFFICE/OUTPT VISIT, EST, LEVL IV, 30-39 MIN: ICD-10-PCS | Mod: S$GLB,,, | Performed by: FAMILY MEDICINE

## 2022-08-25 PROCEDURE — 99999 PR PBB SHADOW E&M-EST. PATIENT-LVL IV: CPT | Mod: PBBFAC,,, | Performed by: FAMILY MEDICINE

## 2022-08-25 PROCEDURE — U0002 COVID-19 LAB TEST NON-CDC: HCPCS | Mod: QW,S$GLB,, | Performed by: FAMILY MEDICINE

## 2022-08-25 PROCEDURE — 3008F PR BODY MASS INDEX (BMI) DOCUMENTED: ICD-10-PCS | Mod: CPTII,S$GLB,, | Performed by: FAMILY MEDICINE

## 2022-08-25 RX ORDER — ROSUVASTATIN CALCIUM 10 MG/1
10 TABLET, COATED ORAL DAILY
Qty: 90 TABLET | Refills: 1 | Status: SHIPPED | OUTPATIENT
Start: 2022-08-25 | End: 2023-11-30 | Stop reason: SDUPTHER

## 2022-08-25 NOTE — PROGRESS NOTES
Casie Frias Givens  08/26/2022  [id number]    Rossana Ang MD  Patient Care Team:  Rossana Ang MD as PCP - General (Family Medicine)  Radha Foley LPN as Care Coordinator (Internal Medicine)  Rita Sawant, PharmD as Pharmacist (Oncology)  Gerri Meyers RD (Inactive) as Dietitian (Nutrition)  Jessica Brink PharmD as Hypertension Digital Medicine Clinician (Pharmacist)  Rossana Ang MD as Hypertension Digital Medicine Responsible Provider (Family Medicine)  Madelaine Kraus as Digital Medicine Health           Visit Type:an urgent visit for an existing problem     Chief Complaint:  Chief Complaint   Patient presents with    Back Pain     Lower back pain started over the weekend. The day before and yesterday was really bad. She has been tired as well. The pain moves from lower back to her left side.    Shortness of Breath     Noticed Friday or Saturday of last week. Daughter was in her bed Thursday night and tested positive on Friday        History of Present Illness:  66 year old  Coming in for issues with back pain, SOB and fatigue  She had a positive contact with her daughter last week, testing positive.  Mild cough, no fever    She did take tylenol for pain    Lower back pain, lower back to left side of her buttock and around front.      She has hisory of Carinomatosis and Ovarian Cancer.  She is followed by GYN ONC.  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  She has history of CHF, but has been evolemic on past visits.  She had a nuclear stress test and echo with Cards in 2019.   EF 61%, elevated PA  systolic pressure.    Last Ca 125 nl range 3 months ago        History:  Past Medical History:   Diagnosis Date    Anemia     Anxiety     Arthritis     knees    Cancer     ovarian    CHF (congestive heart failure)     Hx antineoplastic chemotherapy     last 2019    Hyperlipemia     Hypertension     Neck pain     Ovarian cancer 2019    CHEMO    Peritoneal carcinomatosis 2019     Past Surgical History:   Procedure Laterality Date    breast reduction  10/02/2018     SECTION      X 1    COLONOSCOPY N/A 2021    Procedure: COLONOSCOPY;  Surgeon: Paulette Rojas MD;  Location: [facility] ENDO;  Service: Gastroenterology;  Laterality: N/A;    CYSTOSCOPY W/ URETERAL STENT PLACEMENT Right 2019    Procedure: CYSTOSCOPY, WITH URETERAL STENT INSERTION;  Surgeon: Timmy Santiago IV, MD;  Location: [facility] OR;  Service: Urology;  Laterality: Right;    CYSTOSCOPY W/ URETERAL STENT REMOVAL  10/04/2019    DILATION AND CURETTAGE OF UTERUS      HYSTERECTOMY      RALH for fibroids (still has ovaries)    INSERTION OF VENOUS ACCESS PORT Left 2019    Procedure: INSERTION, VENOUS ACCESS PORT;  Surgeon: Ulisses Monzon MD;  Location: [facility] OR;  Service: General;  Laterality: Left;  Left internal jugular     LYSIS OF ADHESIONS OF URETER N/A 8/15/2019    Procedure: URETEROLYSIS;  Surgeon: Ismael Juarez MD;  Location: [facility] OR;  Service: OB/GYN;  Laterality: N/A;    OMENTECTOMY N/A 8/15/2019    Procedure: OMENTECTOMY;  Surgeon: Ismael Juarez MD;  Location: [facility] OR;  Service: OB/GYN;  Laterality: N/A;    RETROGRADE PYELOGRAPHY Right 2019    Procedure: PYELOGRAM, RETROGRADE;  Surgeon: Timmy Santiago IV, MD;  Location: [facility] OR;  Service: Urology;  Laterality: Right;    ROBOT-ASSISTED LAPAROSCOPIC SALPINGO-OOPHORECTOMY USING DA TIA XI Bilateral 8/15/2019    Procedure: XI ROBOTIC SALPINGO-OOPHORECTOMY;  Surgeon: Ismael Juarez MD;  Location: [facility];  Service: OB/GYN;  Laterality: Bilateral;     TOTAL REDUCTION MAMMOPLASTY  2018    TUBAL LIGATION       Family History   Problem Relation Age of Onset    Anesthesia problems Other     Breast cancer Maternal Aunt     Breast cancer Paternal Aunt     Ovarian cancer Paternal Aunt     Colon cancer Brother     Thrombophilia Neg Hx      Social History     Socioeconomic History    Marital status:    Tobacco Use    Smoking status: Former Smoker     Packs/day: 1.00     Years: 25.00     Pack years: 25.00     Quit date: 10/1/2018     Years since quitting: 3.9    Smokeless tobacco: Never Used    Tobacco comment: States started quit 2 months ago after 30 years   Substance and Sexual Activity    Alcohol use: Never     Alcohol/week: 0.0 standard drinks     Comment: occasionally  No alcohol 72h prior to sx    Drug use: No    Sexual activity: Not Currently     Partners: Male     Comment: hyst; mut monog   Social History Narrative    Live w/ spouse and 2 dogs      Patient Active Problem List   Diagnosis    Hypertension    Osteoarthritis of both knees    History of CHF (congestive heart failure)    Hyperlipidemia    Peritoneal carcinomatosis    Morbid obesity    Status post placement of ureteral stent    Abnormal ECG    Pulmonary HTN    Ovarian cancer, bilateral    S/P BSO (bilateral salpingo-oophorectomy)    Encounter for antineoplastic chemotherapy    Anxiety    DDD (degenerative disc disease), cervical    Neck muscle weakness    Family history of colon cancer     Review of patient's allergies indicates:  No Known Allergies    The following were reviewed at this visit: active problem list, medication list, allergies, family history, social history, and health maintenance.    Medications:  Current Outpatient Medications on File Prior to Visit   Medication Sig Dispense Refill    albuterol 90 mcg/actuation inhaler Inhale 2 puffs into the lungs every 4 (four) hours as needed for Wheezing. Rescue 18 g 0    ALPRAZolam (XANAX) 0.25 MG tablet  Take 1 tablet (0.25 mg total) by mouth 3 (three) times daily as needed for Anxiety. 60 tablet 2    amLODIPine (NORVASC) 5 MG tablet Take 2 tablets (10 mg total) by mouth once daily. 60 tablet 11    atenoloL (TENORMIN) 25 MG tablet Take 1 tablet (25 mg total) by mouth once daily. 30 tablet 11    azelastine (ASTELIN) 137 mcg (0.1 %) nasal spray 1 spray (137 mcg total) by Nasal route 2 (two) times daily. 30 mL 0    diclofenac sodium (VOLTAREN) 1 % Gel Apply once a day to back as needed 100 g 1    EPINEPHrine (EPIPEN) 0.3 mg/0.3 mL AtIn Inject 0.3 mLs (0.3 mg total) into the muscle once. for 1 dose 2 each 0    fluticasone propionate (FLONASE) 50 mcg/actuation nasal spray 1 spray (50 mcg total) by Each Nostril route every 12 (twelve) hours as needed for Rhinitis. 16 g 0    gabapentin (NEURONTIN) 100 MG capsule Take 1 capsule (100 mg total) by mouth 3 (three) times daily. 90 capsule 11    ginkgo biloba 40 mg Tab Take 40 mg by mouth.      hydrOXYzine HCL (ATARAX) 25 MG tablet Take 1 tablet (25 mg total) by mouth 3 (three) times daily as needed for Itching. 90 tablet 1    multivitamin with minerals tablet Take 1 tablet by mouth once daily.       olmesartan-hydrochlorothiazide (BENICAR HCT) 40-25 mg per tablet Take 1 tablet by mouth once daily. 90 tablet 1    sertraline (ZOLOFT) 25 MG tablet Take 1 tablet (25 mg total) by mouth once daily. 30 tablet 11    zinc gluconate 50 mg tablet Take 50 mg by mouth once daily.      amoxicillin (AMOXIL) 875 MG tablet Take 1 tablet (875 mg total) by mouth every 12 (twelve) hours. (Patient not taking: No sig reported) 14 tablet 0    azithromycin (Z-ROSE) 250 MG tablet Take 2 tablets by mouth on day 1; Take 1 tablet by mouth on days 2-5 (Patient not taking: Reported on 8/25/2022) 6 tablet 0    biotin 1 mg tablet Take 1,000 mcg by mouth once daily.       cetirizine (ZYRTEC) 10 MG tablet Take 1 tablet (10 mg total) by mouth once daily. (Patient not taking: Reported on  8/25/2022) 30 tablet 5    polyethylene glycol (GLYCOLAX) 17 gram/dose powder Take 17 g by mouth daily as needed (constipation). (Patient not taking: Reported on 8/25/2022) 510 g 0    sumatriptan (IMITREX) 50 MG tablet Take 1 tablet (50 mg total) by mouth every 4 to 6 hours as needed for Migraine. 30 tablet 1     No current facility-administered medications on file prior to visit.       Medications have been reviewed and reconciled with patient at this visit.  Barriers to medications reviewed with patient.    Adverse reactions to current medications reviewed with patient..    Over the counter medications reviewed and reconciled with patient.    Exam:  Wt Readings from Last 3 Encounters:   08/25/22 114.5 kg (252 lb 8.6 oz)   05/09/22 114.8 kg (253 lb 1.6 oz)   04/24/22 113.4 kg (250 lb)     Temp Readings from Last 3 Encounters:   08/25/22 98.4 °F (36.9 °C) (Temporal)   04/24/22 97 °F (36.1 °C)   04/14/22 97.9 °F (36.6 °C) (Temporal)     BP Readings from Last 3 Encounters:   08/25/22 130/80   05/09/22 (!) 109/52   04/24/22 118/64     Pulse Readings from Last 3 Encounters:   08/25/22 105   05/09/22 70   04/24/22 88     Body mass index is 39.55 kg/m².      Review of Systems   Constitutional: Negative.  Negative for chills and fever.   HENT: Negative.  Negative for congestion, sinus pain and sore throat.    Eyes: Negative for blurred vision and double vision.   Respiratory: Positive for shortness of breath. Negative for cough, sputum production and wheezing.    Cardiovascular: Negative for chest pain, palpitations and leg swelling.   Gastrointestinal: Negative for abdominal pain, constipation, diarrhea, heartburn, nausea and vomiting.   Genitourinary: Positive for frequency.   Musculoskeletal: Positive for back pain.   Skin: Negative.  Negative for rash.   Neurological: Negative.    Endo/Heme/Allergies: Negative.  Negative for polydipsia. Does not bruise/bleed easily.   Psychiatric/Behavioral: Negative for depression  and substance abuse.     Physical Exam  Nursing note reviewed.   Cardiovascular:      Rate and Rhythm: Normal rate and regular rhythm.   Pulmonary:      Effort: Pulmonary effort is normal. No respiratory distress.   Neurological:      Mental Status: She is alert and oriented to person, place, and time.   Psychiatric:         Mood and Affect: Mood normal.         Behavior: Behavior normal.         Thought Content: Thought content normal.         Judgment: Judgment normal.         Laboratory Reviewed ({Yes)  Lab Results   Component Value Date    WBC 8.42 08/25/2022    HGB 12.0 08/25/2022    HCT 38.7 08/25/2022     08/25/2022    CHOL 163 01/06/2022    TRIG 71 01/06/2022    HDL 51 01/06/2022    ALT 20 08/25/2022    AST 19 08/25/2022     08/25/2022    K 3.5 08/25/2022     08/25/2022    CREATININE 0.7 08/25/2022    BUN 9 08/25/2022    CO2 27 08/25/2022    TSH 1.869 04/19/2013    INR 1.0 01/28/2019       Casie was seen today for back pain and shortness of breath.    Diagnoses and all orders for this visit:    Back pain, unspecified back location, unspecified back pain laterality, unspecified chronicity  -     Urinalysis, Reflex to Urine Culture Urine, Clean Catch; Future    S/P BSO (bilateral salpingo-oophorectomy)  -     rosuvastatin (CRESTOR) 10 MG tablet; Take 1 tablet (10 mg total) by mouth once daily.  -     Urinalysis, Reflex to Urine Culture Urine, Clean Catch; Future    Ovarian cancer, bilateral  -     Comprehensive Metabolic Panel; Future  -     CBC Without Differential; Future    Pulmonary HTN  -     Comprehensive Metabolic Panel; Future  -     CBC Without Differential; Future    Hypertension, unspecified type  -     Comprehensive Metabolic Panel; Future  -     CBC Without Differential; Future  -     rosuvastatin (CRESTOR) 10 MG tablet; Take 1 tablet (10 mg total) by mouth once daily.    Shortness of breath  -     POCT COVID-19 Rapid Screening  -     Cancel: POCT COVID-19 Rapid  Screening    Malignant neoplasm of ovary, unspecified laterality  -     rosuvastatin (CRESTOR) 10 MG tablet; Take 1 tablet (10 mg total) by mouth once daily.      Lungs are clear  No wheeze  Pulse ox fine  No CP    Lower back pain radiates to front    Check UA for UTI/Blood  COVID negative in office    Tylenol for pain    Return if still ongoing            Care Plan/Goals: Reviewed    Goals        Patient Stated      Blood Pressure < 130/80 (pt-stated)        Other      Exercise at least 150 minutes per week.       Maintain a low sodium diet       American Heart Association (AHA) guidelines recommend less than 1500 mg of dietary salt per day.          Take at least one BP reading per week at various times of the day           Follow up: Follow up if symptoms worsen or fail to improve.    After visit summary was printed and given to patient upon discharge today.  Patient goals and care plan are included in After Visit Summary.

## 2022-09-19 ENCOUNTER — OFFICE VISIT (OUTPATIENT)
Dept: GYNECOLOGIC ONCOLOGY | Facility: CLINIC | Age: 67
End: 2022-09-19
Payer: MEDICARE

## 2022-09-19 ENCOUNTER — TELEPHONE (OUTPATIENT)
Dept: GYNECOLOGIC ONCOLOGY | Facility: CLINIC | Age: 67
End: 2022-09-19
Payer: MEDICARE

## 2022-09-19 VITALS
WEIGHT: 254 LBS | HEIGHT: 67 IN | BODY MASS INDEX: 39.87 KG/M2 | SYSTOLIC BLOOD PRESSURE: 130 MMHG | HEART RATE: 71 BPM | DIASTOLIC BLOOD PRESSURE: 67 MMHG

## 2022-09-19 DIAGNOSIS — C56.3 OVARIAN CANCER, BILATERAL: Primary | ICD-10-CM

## 2022-09-19 DIAGNOSIS — C78.6 PERITONEAL CARCINOMATOSIS: ICD-10-CM

## 2022-09-19 PROCEDURE — 3008F BODY MASS INDEX DOCD: CPT | Mod: CPTII,S$GLB,, | Performed by: OBSTETRICS & GYNECOLOGY

## 2022-09-19 PROCEDURE — 1125F PR PAIN SEVERITY QUANTIFIED, PAIN PRESENT: ICD-10-PCS | Mod: CPTII,S$GLB,, | Performed by: OBSTETRICS & GYNECOLOGY

## 2022-09-19 PROCEDURE — 1160F RVW MEDS BY RX/DR IN RCRD: CPT | Mod: CPTII,S$GLB,, | Performed by: OBSTETRICS & GYNECOLOGY

## 2022-09-19 PROCEDURE — 3078F DIAST BP <80 MM HG: CPT | Mod: CPTII,S$GLB,, | Performed by: OBSTETRICS & GYNECOLOGY

## 2022-09-19 PROCEDURE — 99214 OFFICE O/P EST MOD 30 MIN: CPT | Mod: S$GLB,,, | Performed by: OBSTETRICS & GYNECOLOGY

## 2022-09-19 PROCEDURE — 1159F PR MEDICATION LIST DOCUMENTED IN MEDICAL RECORD: ICD-10-PCS | Mod: CPTII,S$GLB,, | Performed by: OBSTETRICS & GYNECOLOGY

## 2022-09-19 PROCEDURE — 3008F PR BODY MASS INDEX (BMI) DOCUMENTED: ICD-10-PCS | Mod: CPTII,S$GLB,, | Performed by: OBSTETRICS & GYNECOLOGY

## 2022-09-19 PROCEDURE — 3078F PR MOST RECENT DIASTOLIC BLOOD PRESSURE < 80 MM HG: ICD-10-PCS | Mod: CPTII,S$GLB,, | Performed by: OBSTETRICS & GYNECOLOGY

## 2022-09-19 PROCEDURE — 3075F SYST BP GE 130 - 139MM HG: CPT | Mod: CPTII,S$GLB,, | Performed by: OBSTETRICS & GYNECOLOGY

## 2022-09-19 PROCEDURE — 3075F PR MOST RECENT SYSTOLIC BLOOD PRESS GE 130-139MM HG: ICD-10-PCS | Mod: CPTII,S$GLB,, | Performed by: OBSTETRICS & GYNECOLOGY

## 2022-09-19 PROCEDURE — 1125F AMNT PAIN NOTED PAIN PRSNT: CPT | Mod: CPTII,S$GLB,, | Performed by: OBSTETRICS & GYNECOLOGY

## 2022-09-19 PROCEDURE — 99999 PR PBB SHADOW E&M-EST. PATIENT-LVL III: ICD-10-PCS | Mod: PBBFAC,,, | Performed by: OBSTETRICS & GYNECOLOGY

## 2022-09-19 PROCEDURE — 1160F PR REVIEW ALL MEDS BY PRESCRIBER/CLIN PHARMACIST DOCUMENTED: ICD-10-PCS | Mod: CPTII,S$GLB,, | Performed by: OBSTETRICS & GYNECOLOGY

## 2022-09-19 PROCEDURE — 1159F MED LIST DOCD IN RCRD: CPT | Mod: CPTII,S$GLB,, | Performed by: OBSTETRICS & GYNECOLOGY

## 2022-09-19 PROCEDURE — 99214 PR OFFICE/OUTPT VISIT, EST, LEVL IV, 30-39 MIN: ICD-10-PCS | Mod: S$GLB,,, | Performed by: OBSTETRICS & GYNECOLOGY

## 2022-09-19 PROCEDURE — 99999 PR PBB SHADOW E&M-EST. PATIENT-LVL III: CPT | Mod: PBBFAC,,, | Performed by: OBSTETRICS & GYNECOLOGY

## 2022-09-19 NOTE — PROGRESS NOTES
Subjective:      Patient ID: Casie Gaines is a 66 y.o. female.    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   Here today for continued surveillance.  CA-125 last visit was stable at 7 4 months ago, pending today.  Has new back and lower abdominal pain similar to when she was first diagnosed.  Denies F/C, N/V, VB.  Has had normal bladder and bowel function.  Review of Systems   Constitutional:  Negative for activity change, appetite change, chills, fatigue and fever.   HENT:  Negative for hearing loss, mouth sores, nosebleeds, sore throat and tinnitus.    Eyes:  Negative for visual disturbance.   Respiratory:  Negative for cough, chest tightness, shortness of breath and wheezing.    Cardiovascular:  Negative for chest pain and leg swelling.   Gastrointestinal:  Negative for abdominal distention, abdominal pain, blood in stool, constipation, diarrhea, nausea and vomiting.   Genitourinary:  Negative for dysuria, flank pain, frequency, hematuria, pelvic pain, vaginal bleeding, vaginal discharge and vaginal pain.   Musculoskeletal:  Negative for arthralgias and back pain.   Skin:  Negative for rash.   Neurological:  Negative for dizziness, seizures, syncope, weakness and numbness.   Hematological:  Does not bruise/bleed easily.   Psychiatric/Behavioral:  Negative for confusion and sleep disturbance. The patient is not nervous/anxious.       Objective:   Physical Exam:   Constitutional: She appears well-developed and well-nourished. No distress.    HENT:   Head: Normocephalic and atraumatic.    Eyes: No scleral icterus.     Cardiovascular:  Normal rate and intact distal pulses.      Exam reveals no cyanosis and no edema.        Pulmonary/Chest: Effort normal. No respiratory distress. She exhibits no tenderness.        Abdominal: Soft. She exhibits no distension (incisions healing well), no fluid wave and no mass. There is no abdominal tenderness. There is no rebound and no guarding. No hernia.     Genitourinary:    Vagina and rectum normal.      Pelvic exam was performed with patient supine.   Labial bartholins normal.There is no rash, tenderness or lesion on the right labia. There is no rash, tenderness or lesion on the left labia. Right adnexum displays no mass, no tenderness and no fullness. Left adnexum displays no mass, no tenderness and no fullness. Vaginal cuff normal.  No  no vaginal discharge, bleeding or unspecified prolapse of vaginal walls in the vagina. Cervix is absent.Uterus is absent.              Lymphadenopathy:     She has no cervical adenopathy. No inguinal adenopathy noted on the right or left side.     Skin: No cyanosis.      Assessment:     1. Ovarian cancer, bilateral    2. Peritoneal carcinomatosis        Plan:       Patient is doing well and is CLYDE on exam. Will obtain CA-125 today.  CT C/A/P in BR due to symptoms.  RTC 6 months if normal.

## 2022-09-19 NOTE — TELEPHONE ENCOUNTER
"----- Message from Warner Becerraon sent at 9/19/2022 10:54 AM CDT -----  Consult/Advisory:          Name Of Caller: Self      Contact Preference?: [phone]       Provider Name: Ann      Does patient feel the need to be seen today? Yes      What is the nature of the call?: Calling to speak w/ nurse. Stating she'll be running more than 30 mins late for today's 11 am appt. Inquiring if she can still be seen          Additional Notes:  "Thank you for all that you do for our patients"      "

## 2022-09-20 ENCOUNTER — PATIENT MESSAGE (OUTPATIENT)
Dept: PHARMACY | Facility: CLINIC | Age: 67
End: 2022-09-20
Payer: MEDICARE

## 2022-09-27 ENCOUNTER — PATIENT MESSAGE (OUTPATIENT)
Dept: INTERNAL MEDICINE | Facility: CLINIC | Age: 67
End: 2022-09-27
Payer: MEDICARE

## 2022-09-29 ENCOUNTER — HOSPITAL ENCOUNTER (OUTPATIENT)
Dept: RADIOLOGY | Facility: HOSPITAL | Age: 67
Discharge: HOME | End: 2022-09-29
Attending: OBSTETRICS & GYNECOLOGY
Payer: MEDICARE

## 2022-09-29 DIAGNOSIS — I10 HYPERTENSION, UNSPECIFIED TYPE: ICD-10-CM

## 2022-09-29 DIAGNOSIS — C56.3 OVARIAN CANCER, BILATERAL: ICD-10-CM

## 2022-09-29 PROCEDURE — 25500020 PHARM REV CODE 255: Performed by: OBSTETRICS & GYNECOLOGY

## 2022-09-29 PROCEDURE — A9698 NON-RAD CONTRAST MATERIALNOC: HCPCS | Performed by: OBSTETRICS & GYNECOLOGY

## 2022-09-29 PROCEDURE — 71260 CT THORAX DX C+: CPT | Mod: TC

## 2022-09-29 PROCEDURE — 74177 CT ABD & PELVIS W/CONTRAST: CPT | Mod: TC

## 2022-09-29 RX ORDER — ATENOLOL 25 MG/1
25 TABLET ORAL DAILY
Qty: 90 TABLET | Refills: 3 | Status: SHIPPED | OUTPATIENT
Start: 2022-09-29 | End: 2023-10-07 | Stop reason: SDUPTHER

## 2022-09-29 RX ADMIN — IOHEXOL 100 ML: 350 INJECTION, SOLUTION INTRAVENOUS at 04:09

## 2022-09-29 RX ADMIN — IOHEXOL 1000 ML: 9 SOLUTION ORAL at 04:09

## 2022-09-29 NOTE — TELEPHONE ENCOUNTER
Refill Decision Note   Casie Gaines  is requesting a refill authorization.  Brief Assessment and Rationale for Refill:  Approve     Medication Therapy Plan:       Medication Reconciliation Completed: No   Comments:     No Care Gaps recommended.     Note composed:11:48 AM 09/29/2022          (0) No aphasia; normal

## 2022-09-29 NOTE — TELEPHONE ENCOUNTER
No new care gaps identified.  [facility] Embedded Care Gaps. Reference number: [id number]. 9/29/2022   5:08:46 AM KATIAT

## 2022-10-12 DIAGNOSIS — C56.9 MALIGNANT NEOPLASM OF OVARY, UNSPECIFIED LATERALITY: ICD-10-CM

## 2022-10-12 RX ORDER — AMLODIPINE BESYLATE 5 MG/1
10 TABLET ORAL DAILY
Qty: 60 TABLET | Refills: 11 | Status: SHIPPED | OUTPATIENT
Start: 2022-10-12 | End: 2023-08-19 | Stop reason: SDUPTHER

## 2022-11-11 RX ORDER — SERTRALINE HYDROCHLORIDE 25 MG/1
25 TABLET, FILM COATED ORAL DAILY
Qty: 90 TABLET | Refills: 3 | Status: SHIPPED | OUTPATIENT
Start: 2022-11-11 | End: 2023-11-22 | Stop reason: SDUPTHER

## 2022-11-11 NOTE — TELEPHONE ENCOUNTER
Refill Decision Note   Casie Gaines  is requesting a refill authorization.  Brief Assessment and Rationale for Refill:  Approve    -Medication-Related Problems Identified: Requires labs  Medication Therapy Plan:       Medication Reconciliation Completed: No   Comments:     Provider Staff:     Action is required for this patient.   Please see care gap opportunities below in Care Due Message.     Thanks!  [facility]     Appointments      Date Provider   Last Visit   8/25/2022 Rossana Ang MD   Next Visit   Visit date not found Rossana Ang MD     Note composed:11:30 AM 11/11/2022           Note composed:11:30 AM 11/11/2022

## 2022-11-11 NOTE — TELEPHONE ENCOUNTER
Care Due:                  Date            Visit Type   Department     Provider  --------------------------------------------------------------------------------                                EP -                              PRIMARY      [facility] INTERNAL  Last Visit: 08-      CARE ([facility])   MEDICINE       Rossana Ang  Next Visit: None Scheduled  None         None Found                                                            Last  Test          Frequency    Reason                     Performed    Due Date  --------------------------------------------------------------------------------    Lipid Panel.  12 months..  rosuvastatin.............  01- 01-    [facility] Embedded Care Gaps. Reference number: [id number]. 11/11/2022   5:08:43 AM CST

## 2022-12-04 ENCOUNTER — OFFICE VISIT (OUTPATIENT)
Dept: URGENT CARE | Facility: CLINIC | Age: 67
End: 2022-12-04
Payer: MEDICARE

## 2022-12-04 VITALS
TEMPERATURE: 99 F | RESPIRATION RATE: 18 BRPM | BODY MASS INDEX: 39.87 KG/M2 | DIASTOLIC BLOOD PRESSURE: 67 MMHG | HEART RATE: 74 BPM | HEIGHT: 67 IN | SYSTOLIC BLOOD PRESSURE: 139 MMHG | WEIGHT: 254 LBS | OXYGEN SATURATION: 97 %

## 2022-12-04 DIAGNOSIS — H10.32 ACUTE BACTERIAL CONJUNCTIVITIS OF LEFT EYE: Primary | ICD-10-CM

## 2022-12-04 PROCEDURE — 1160F RVW MEDS BY RX/DR IN RCRD: CPT | Mod: CPTII,S$GLB,, | Performed by: EMERGENCY MEDICINE

## 2022-12-04 PROCEDURE — 3078F DIAST BP <80 MM HG: CPT | Mod: CPTII,S$GLB,, | Performed by: EMERGENCY MEDICINE

## 2022-12-04 PROCEDURE — 3078F PR MOST RECENT DIASTOLIC BLOOD PRESSURE < 80 MM HG: ICD-10-PCS | Mod: CPTII,S$GLB,, | Performed by: EMERGENCY MEDICINE

## 2022-12-04 PROCEDURE — 1125F AMNT PAIN NOTED PAIN PRSNT: CPT | Mod: CPTII,S$GLB,, | Performed by: EMERGENCY MEDICINE

## 2022-12-04 PROCEDURE — 1125F PR PAIN SEVERITY QUANTIFIED, PAIN PRESENT: ICD-10-PCS | Mod: CPTII,S$GLB,, | Performed by: EMERGENCY MEDICINE

## 2022-12-04 PROCEDURE — 1159F PR MEDICATION LIST DOCUMENTED IN MEDICAL RECORD: ICD-10-PCS | Mod: CPTII,S$GLB,, | Performed by: EMERGENCY MEDICINE

## 2022-12-04 PROCEDURE — 1159F MED LIST DOCD IN RCRD: CPT | Mod: CPTII,S$GLB,, | Performed by: EMERGENCY MEDICINE

## 2022-12-04 PROCEDURE — 1160F PR REVIEW ALL MEDS BY PRESCRIBER/CLIN PHARMACIST DOCUMENTED: ICD-10-PCS | Mod: CPTII,S$GLB,, | Performed by: EMERGENCY MEDICINE

## 2022-12-04 PROCEDURE — 3075F PR MOST RECENT SYSTOLIC BLOOD PRESS GE 130-139MM HG: ICD-10-PCS | Mod: CPTII,S$GLB,, | Performed by: EMERGENCY MEDICINE

## 2022-12-04 PROCEDURE — 3008F BODY MASS INDEX DOCD: CPT | Mod: CPTII,S$GLB,, | Performed by: EMERGENCY MEDICINE

## 2022-12-04 PROCEDURE — 3075F SYST BP GE 130 - 139MM HG: CPT | Mod: CPTII,S$GLB,, | Performed by: EMERGENCY MEDICINE

## 2022-12-04 PROCEDURE — 3008F PR BODY MASS INDEX (BMI) DOCUMENTED: ICD-10-PCS | Mod: CPTII,S$GLB,, | Performed by: EMERGENCY MEDICINE

## 2022-12-04 PROCEDURE — 99213 PR OFFICE/OUTPT VISIT, EST, LEVL III, 20-29 MIN: ICD-10-PCS | Mod: S$GLB,,, | Performed by: EMERGENCY MEDICINE

## 2022-12-04 PROCEDURE — 99213 OFFICE O/P EST LOW 20 MIN: CPT | Mod: S$GLB,,, | Performed by: EMERGENCY MEDICINE

## 2022-12-04 RX ORDER — ERYTHROMYCIN 5 MG/G
OINTMENT OPHTHALMIC 3 TIMES DAILY
Qty: 3.5 G | Refills: 0 | Status: SHIPPED | OUTPATIENT
Start: 2022-12-04 | End: 2022-12-09

## 2022-12-04 NOTE — PATIENT INSTRUCTIONS
Gentamycin as prescribed in the affected eye for 5 days.  If the pinkeye spreads to the opposite eye, start a 5 day treatment in that eye as well.  Quarantine for full 24 hr on the eye antibiotic as you are contagious until you have had 24 hr of treatment.  Wash hands frequently and avoid touching the face to prevent the spread of the disease.    Conjunctivitis (Pinkeye) Discharge Instructions   About this topic   The medical name for pink eye is conjunctivitis. Your eye is irritated or infected. It is red and irritated. Your eye may also itch, burn, or be sensitive to light. If an infection is causing your pink eye, it is easy to spread from person to person.  Infections are often caused by viruses and antibiotics wont help. Most of the time, pink eye will go away on its own without treatment after a few days.  If the doctor thinks you have a bacterial infection in your eye, you will need antibiotics to treat the infection. It is important to take all of your antibiotics even if your eye starts to feel better.       What care is needed at home?   Ask your doctor what you need to do when you go home. Make sure you ask questions if you do not understand what the doctor says.  Wash your hands before touching your eyes. Gently remove your eye drainage or crusting with a clean cloth and warm water.  Avoid pollen if an allergy is causing your pink eye. Stay inside as much as you can with the windows closed during peak allergy seasons.  Lubricating eye drops or allergy eye drops may help your eye feel better. Make sure to read the directions carefully. Wash your hands with care before and after you touch your eye.  When you use eye drops, take care not to touch the bottle or dropper to your eye.  If you wear contact lenses, you will need to stop wearing them for a short time until your symptoms go away. If your contacts are disposable, start with fresh ones after your eye gets better. If not, clean your contacts with  care. Clean your contact case thoroughly or get a new one.  Avoid sharing towels, washcloths, bedding, or personal items when you have pink eye.  You may need to stay out of work or school for a few days.  What follow-up care is needed?   Your doctor may ask you to make visits to the office to check on your progress. Be sure to keep these visits.  What drugs may be needed?   The doctor may order drugs based on the cause of your health problem. Talk to your doctor about what drugs you need to take.   Will physical activity be limited?   Your physical activities will not be limited. Remember, this infection spreads easily from person to person. Ask your doctor when you can go back to work or school.  What problems could happen?   You may be infected again from someone in your house, workplace, or school.  What can be done to prevent this health problem?   Good hygiene can help prevent the spread of this infection.  Wash your hands well and often, after touching your face, and after you cough or sneeze.  Do not share eye make-up or eye drops with others.  Do not share pillows or towels with others.  Clean contact lenses daily. Do not sleep in them unless your eye doctor says it is OK.  When do I need to call the doctor?   You have trouble seeing clearly after blinking.  Your eye is still red or has drainage after 3 days.  You have eye pain that is getting worse.  Teach Back: Helping You Understand   The Teach Back Method helps you understand the information we are giving you. After you talk with the staff, tell them in your own words what you learned. This helps to make sure the staff has described each thing clearly. It also helps to explain things that may have been confusing. Before going home, make sure you can do these:  I can tell you about my condition.  I can tell you how to care for my eye.  I can tell you what I will do if I have eye pain or blurry eyesight.  Where can I learn more?    FamilyDoctor.org  http://familydoctor.org/familydoctor/en/diseases-conditions/allergic-conjunctivitis.html   [facility]  http://kidshealth.org/en/parents/conjunctivitis.html?ref=search&WT.ac=gjq-t-uhxc-hb-khotzn-qyk   [facility] Cho[facility]  https://www.nhs.uk/conditions/conjunctivitis/   Last Reviewed Date   2021-06-10  Consumer Information Use and Disclaimer   This information is not specific medical advice and does not replace information you receive from your health care provider. This is only a brief summary of general information. It does NOT include all information about conditions, illnesses, injuries, tests, procedures, treatments, therapies, discharge instructions or life-style choices that may apply to you. You must talk with your health care provider for complete information about your health and treatment options. This information should not be used to decide whether or not to accept your health care providers advice, instructions or recommendations. Only your health care provider has the knowledge and training to provide advice that is right for you.  Copyright   Copyright © 2021 [employer], [employer] and its affiliates and/or licensors. All rights reserved.

## 2022-12-04 NOTE — PROGRESS NOTES
"Subjective:       Patient ID: Casie Gaines is a 67 y.o. female.    Vitals:  height is 5' 7" (1.702 m) and weight is 115.2 kg (254 lb). Her temperature is 98.7 °F (37.1 °C). Her blood pressure is 139/67 and her pulse is 74. Her respiration is 18 and oxygen saturation is 97%.     Chief Complaint: Eye Problem    Pt states she woke up two days ago with left eye irritation. Pt states in the corner of her eye it feels like sand. Pt states she tried relieving the irritation with visine but it just burned.Pt states the irritation has gotten worse and states it rhoades a lot.  Has slightly blurred vision with the swelling in the eye.    Eye Problem   The left eye is affected. This is a new problem. The current episode started in the past 7 days. The problem occurs constantly. The problem has been unchanged. The injury mechanism is unknown. The pain is at a severity of 4/10. The pain is mild. There is No known exposure to pink eye. She Does not wear contacts. Associated symptoms include eye redness. Pertinent negatives include no blurred vision, eye discharge, double vision, fever, foreign body sensation, itching, nausea, photophobia, recent URI or vomiting. She has tried eye drops for the symptoms. The treatment provided no relief.     Constitution: Negative for fever.   Eyes:  Positive for eye redness. Negative for eye discharge, eye itching, photophobia, double vision and blurred vision.   Gastrointestinal:  Negative for nausea and vomiting.     Objective:      Physical Exam   Constitutional: She is oriented to person, place, and time.   HENT:   Head: Normocephalic and atraumatic.   Mouth/Throat: Mucous membranes are moist.   Eyes: Pupils are equal, round, and reactive to light. Extraocular movement intact      Comments: Some conjunctival edema in the lower left eye.  There is conjunctival injection without corneal abrasion after fluorescein staining.  No foreign body seen   Pulmonary/Chest: Effort normal. "   Abdominal: Normal appearance.   Neurological: She is alert and oriented to person, place, and time.   Skin: Skin is warm and dry.   Psychiatric: Her behavior is normal.   Nursing note and vitals reviewed.      Assessment:       No diagnosis found.      Plan:         There are no diagnoses linked to this encounter.

## 2022-12-06 ENCOUNTER — HOSPITAL ENCOUNTER (EMERGENCY)
Facility: HOSPITAL | Age: 67
Discharge: HOME | End: 2022-12-06
Attending: EMERGENCY MEDICINE
Payer: MEDICARE

## 2022-12-06 VITALS
WEIGHT: 256.63 LBS | OXYGEN SATURATION: 97 % | BODY MASS INDEX: 40.19 KG/M2 | DIASTOLIC BLOOD PRESSURE: 86 MMHG | RESPIRATION RATE: 16 BRPM | SYSTOLIC BLOOD PRESSURE: 160 MMHG | TEMPERATURE: 99 F | HEART RATE: 87 BPM

## 2022-12-06 DIAGNOSIS — H10.32 ACUTE BACTERIAL CONJUNCTIVITIS OF LEFT EYE: Primary | ICD-10-CM

## 2022-12-06 PROCEDURE — 99282 EMERGENCY DEPT VISIT SF MDM: CPT

## 2022-12-06 RX ORDER — MOXIFLOXACIN 5 MG/ML
1 SOLUTION/ DROPS OPHTHALMIC
Qty: 3 ML | Refills: 0 | Status: SHIPPED | OUTPATIENT
Start: 2022-12-06 | End: 2022-12-26

## 2022-12-06 RX ORDER — CEPHALEXIN 500 MG/1
500 CAPSULE ORAL 4 TIMES DAILY
Qty: 28 CAPSULE | Refills: 0 | Status: SHIPPED | OUTPATIENT
Start: 2022-12-06 | End: 2022-12-13

## 2022-12-07 NOTE — ED PROVIDER NOTES
HISTORY     Chief Complaint   Patient presents with    Eye Problem     Redness, swelling, and drainage to R eye. Seen at  for same and told she had pink eye, states it is getting worse.      Review of patient's allergies indicates:  No Known Allergies     HPI   The history is provided by the patient. No  was used.   Conjunctivitis   The current episode started several days ago. The problem occurs continuously. The problem has been unchanged. The problem is mild. Nothing relieves the symptoms. Nothing aggravates the symptoms. Associated symptoms include eye discharge, eye pain and eye redness. Pertinent negatives include no fever, no decreased vision, no double vision, no photophobia, no abdominal pain, no congestion, no ear discharge, no headaches, no rhinorrhea and no cough.    Contrary to the triage note the symptoms are to the left eye.  Patient does not wear contacts.  Patient has tried erythromycin eye ointment without relief  PCP: Rossana Ang MD     Past Medical History:  Past Medical History:   Diagnosis Date    Anemia     Anxiety     Arthritis     knees    Cancer     ovarian    CHF (congestive heart failure)     Hx antineoplastic chemotherapy     last 2019    Hyperlipemia     Hypertension     Neck pain     Ovarian cancer 2019    CHEMO    Peritoneal carcinomatosis 2019        Past Surgical History:  Past Surgical History:   Procedure Laterality Date    breast reduction  10/02/2018     SECTION      X 1    COLONOSCOPY N/A 2021    Procedure: COLONOSCOPY;  Surgeon: Paulette Rojas MD;  Location: [facility];  Service: Gastroenterology;  Laterality: N/A;    CYSTOSCOPY W/ URETERAL STENT PLACEMENT Right 2019    Procedure: CYSTOSCOPY, WITH URETERAL STENT INSERTION;  Surgeon: Timmy Santiago IV, MD;  Location: [facility] OR;  Service: Urology;  Laterality: Right;    CYSTOSCOPY W/ URETERAL STENT REMOVAL  10/04/2019    DILATION AND CURETTAGE OF UTERUS       HYSTERECTOMY      RALH for fibroids (still has ovaries)    INSERTION OF VENOUS ACCESS PORT Left 2019    Procedure: INSERTION, VENOUS ACCESS PORT;  Surgeon: Ulisses Monzon MD;  Location: [facility];  Service: General;  Laterality: Left;  Left internal jugular     LYSIS OF ADHESIONS OF URETER N/A 8/15/2019    Procedure: URETEROLYSIS;  Surgeon: Ismael Juarez MD;  Location: [facility] OR;  Service: OB/GYN;  Laterality: N/A;    OMENTECTOMY N/A 8/15/2019    Procedure: OMENTECTOMY;  Surgeon: Ismael Juarez MD;  Location: [facility] OR;  Service: OB/GYN;  Laterality: N/A;    RETROGRADE PYELOGRAPHY Right 2019    Procedure: PYELOGRAM, RETROGRADE;  Surgeon: Timmy Santiago IV, MD;  Location: [facility];  Service: Urology;  Laterality: Right;    ROBOT-ASSISTED LAPAROSCOPIC SALPINGO-OOPHORECTOMY USING DA TIA XI Bilateral 8/15/2019    Procedure: XI ROBOTIC SALPINGO-OOPHORECTOMY;  Surgeon: Ismael Juarez MD;  Location: [facility];  Service: OB/GYN;  Laterality: Bilateral;    TOTAL REDUCTION MAMMOPLASTY  2018    TUBAL LIGATION          Family History:  Family History   Problem Relation Age of Onset    Anesthesia problems Other     Breast cancer Maternal Aunt     Breast cancer Paternal Aunt     Ovarian cancer Paternal Aunt     Colon cancer Brother     Thrombophilia Neg Hx         Social History:  Social History     Tobacco Use    Smoking status: Former     Packs/day: 1.00     Years: 25.00     Pack years: 25.00     Types: Cigarettes     Quit date: 10/1/2018     Years since quittin.1    Smokeless tobacco: Never    Tobacco comments:     States started quit 2 months ago after 30 years   Substance and Sexual Activity    Alcohol use: Never     Alcohol/week: 0.0 standard drinks     Comment: occasionally  No alcohol 72h prior to sx    Drug use: No    Sexual activity: Not Currently     Partners: Male     Comment: hyst; mut monog         ROS   Review of Systems   Constitutional:  Negative for fever.   HENT:  Negative for congestion, ear  discharge and rhinorrhea.    Eyes:  Positive for pain, discharge and redness. Negative for double vision, photophobia and visual disturbance.   Respiratory:  Negative for cough.    Gastrointestinal:  Negative for abdominal pain.   Neurological:  Negative for headaches.     PHYSICAL EXAM     Initial Vitals [12/06/22 1444]   BP Pulse Resp Temp SpO2   (!) 160/86 87 16 98.5 °F (36.9 °C) 97 %      MAP       --           Physical Exam    Nursing note and vitals reviewed.  Constitutional: She appears well-developed and well-nourished. She is not diaphoretic. No distress.   HENT:   Head: Normocephalic and atraumatic.   Eyes: Right eye exhibits no discharge. Left eye exhibits discharge.   Scleral injection in the left eye.  Redness noted to the upper and lower lid.  Patient denies any visual disturbance.   Neck: Neck supple.   Normal range of motion.  Cardiovascular:  Normal rate.           Pulmonary/Chest: No respiratory distress.   Abdominal: Abdomen is soft. She exhibits no distension.   Musculoskeletal:         General: Normal range of motion.      Cervical back: Normal range of motion and neck supple.     Neurological: She is alert and oriented to person, place, and time. She has normal strength.   Skin: Skin is warm and dry.   Psychiatric: She has a normal mood and affect. Her behavior is normal. Thought content normal.        ED COURSE   Procedures  ED ONGOING VITALS:  Vitals:    12/06/22 1442 12/06/22 1444   BP:  (!) 160/86   Pulse:  87   Resp:  16   Temp:  98.5 °F (36.9 °C)   TempSrc:  Oral   SpO2:  97%   Weight: 116.4 kg (256 lb 9.9 oz)          ABNORMAL LAB VALUES:  Labs Reviewed - No data to display      ALL LAB VALUES:  none      RADIOLOGY STUDIES:  Imaging Results    None                   The above vital signs and test results have been reviewed by the emergency provider.     ED Medications:  Discharge Medication List as of 12/6/2022  2:56 PM        START taking these medications    Details   cephALEXin  (KEFLEX) 500 MG capsule Take 1 capsule (500 mg total) by mouth 4 (four) times daily. for 7 days, Starting Tue 12/6/2022, Until Tue 12/13/2022, Print      moxifloxacin (VIGAMOX) 0.5 % ophthalmic solution Place 1 drop into the left eye every 8 (eight) hours while awake. for 7 days, Starting Tue 12/6/2022, Until Tue 12/13/2022, Print           Discharge Medications:  Discharge Medication List as of 12/6/2022  2:56 PM        START taking these medications    Details   cephALEXin (KEFLEX) 500 MG capsule Take 1 capsule (500 mg total) by mouth 4 (four) times daily. for 7 days, Starting Tue 12/6/2022, Until Tue 12/13/2022, Print      moxifloxacin (VIGAMOX) 0.5 % ophthalmic solution Place 1 drop into the left eye every 8 (eight) hours while awake. for 7 days, Starting Tue 12/6/2022, Until Tue 12/13/2022, Print             Follow-up Information       Opthalmology.    Specialty: Ophthalmology  Why: As needed  Contact information:  [address] [place] [id number]  [phone]             [facility] - Emergency Dept..    Specialty: Emergency Medicine  Why: If symptoms worsen  Contact information:  [address] [place] [id number] [phone]                          I discussed with patient and/or family/caretaker that evaluation in the ED does not suggest any emergent or life threatening medical conditions requiring immediate intervention beyond what was provided in the ED, and I believe patient is safe for discharge. Regardless, an unremarkable evaluation in the ED does not preclude the development or presence of a serious or life threatening condition. As such, patient was instructed to return immediately for any worsening or change in current symptoms.        MEDICAL DECISION MAKING                 CLINICAL IMPRESSION       ICD-10-CM ICD-9-CM   1. Acute bacterial conjunctivitis of left eye  H10.32 372.03       Disposition:   Disposition: Discharged  Condition: Stable        Duane Gonzales, ASHLEIGH  12/06/22 2127

## 2022-12-12 ENCOUNTER — PATIENT MESSAGE (OUTPATIENT)
Dept: INTERNAL MEDICINE | Facility: CLINIC | Age: 67
End: 2022-12-12

## 2022-12-12 ENCOUNTER — HOSPITAL ENCOUNTER (OUTPATIENT)
Dept: RADIOLOGY | Facility: HOSPITAL | Age: 67
Discharge: HOME | End: 2022-12-12
Attending: FAMILY MEDICINE
Payer: MEDICARE

## 2022-12-12 ENCOUNTER — OFFICE VISIT (OUTPATIENT)
Dept: INTERNAL MEDICINE | Facility: CLINIC | Age: 67
End: 2022-12-12
Payer: MEDICARE

## 2022-12-12 VITALS
HEIGHT: 67 IN | SYSTOLIC BLOOD PRESSURE: 120 MMHG | OXYGEN SATURATION: 98 % | WEIGHT: 255.06 LBS | HEART RATE: 75 BPM | DIASTOLIC BLOOD PRESSURE: 70 MMHG | TEMPERATURE: 97 F | BODY MASS INDEX: 40.03 KG/M2

## 2022-12-12 DIAGNOSIS — M54.9 UPPER BACK PAIN ON RIGHT SIDE: ICD-10-CM

## 2022-12-12 DIAGNOSIS — H10.9 CONJUNCTIVITIS, UNSPECIFIED CONJUNCTIVITIS TYPE, UNSPECIFIED LATERALITY: Primary | ICD-10-CM

## 2022-12-12 DIAGNOSIS — C56.9 MALIGNANT NEOPLASM OF OVARY, UNSPECIFIED LATERALITY: ICD-10-CM

## 2022-12-12 PROCEDURE — 3074F SYST BP LT 130 MM HG: CPT | Mod: CPTII,S$GLB,, | Performed by: FAMILY MEDICINE

## 2022-12-12 PROCEDURE — 71046 X-RAY EXAM CHEST 2 VIEWS: CPT | Mod: 26,,, | Performed by: RADIOLOGY

## 2022-12-12 PROCEDURE — 99214 PR OFFICE/OUTPT VISIT, EST, LEVL IV, 30-39 MIN: ICD-10-PCS | Mod: S$GLB,,, | Performed by: FAMILY MEDICINE

## 2022-12-12 PROCEDURE — 71046 X-RAY EXAM CHEST 2 VIEWS: CPT | Mod: TC

## 2022-12-12 PROCEDURE — 3078F PR MOST RECENT DIASTOLIC BLOOD PRESSURE < 80 MM HG: ICD-10-PCS | Mod: CPTII,S$GLB,, | Performed by: FAMILY MEDICINE

## 2022-12-12 PROCEDURE — 1160F PR REVIEW ALL MEDS BY PRESCRIBER/CLIN PHARMACIST DOCUMENTED: ICD-10-PCS | Mod: CPTII,S$GLB,, | Performed by: FAMILY MEDICINE

## 2022-12-12 PROCEDURE — 1125F AMNT PAIN NOTED PAIN PRSNT: CPT | Mod: CPTII,S$GLB,, | Performed by: FAMILY MEDICINE

## 2022-12-12 PROCEDURE — 1159F PR MEDICATION LIST DOCUMENTED IN MEDICAL RECORD: ICD-10-PCS | Mod: CPTII,S$GLB,, | Performed by: FAMILY MEDICINE

## 2022-12-12 PROCEDURE — 1160F RVW MEDS BY RX/DR IN RCRD: CPT | Mod: CPTII,S$GLB,, | Performed by: FAMILY MEDICINE

## 2022-12-12 PROCEDURE — 71046 XR CHEST PA AND LATERAL: ICD-10-PCS | Mod: 26,,, | Performed by: RADIOLOGY

## 2022-12-12 PROCEDURE — 3008F PR BODY MASS INDEX (BMI) DOCUMENTED: ICD-10-PCS | Mod: CPTII,S$GLB,, | Performed by: FAMILY MEDICINE

## 2022-12-12 PROCEDURE — 3074F PR MOST RECENT SYSTOLIC BLOOD PRESSURE < 130 MM HG: ICD-10-PCS | Mod: CPTII,S$GLB,, | Performed by: FAMILY MEDICINE

## 2022-12-12 PROCEDURE — 99999 PR PBB SHADOW E&M-EST. PATIENT-LVL V: CPT | Mod: PBBFAC,,, | Performed by: FAMILY MEDICINE

## 2022-12-12 PROCEDURE — 99999 PR PBB SHADOW E&M-EST. PATIENT-LVL V: ICD-10-PCS | Mod: PBBFAC,,, | Performed by: FAMILY MEDICINE

## 2022-12-12 PROCEDURE — 99214 OFFICE O/P EST MOD 30 MIN: CPT | Mod: S$GLB,,, | Performed by: FAMILY MEDICINE

## 2022-12-12 PROCEDURE — 3008F BODY MASS INDEX DOCD: CPT | Mod: CPTII,S$GLB,, | Performed by: FAMILY MEDICINE

## 2022-12-12 PROCEDURE — 3078F DIAST BP <80 MM HG: CPT | Mod: CPTII,S$GLB,, | Performed by: FAMILY MEDICINE

## 2022-12-12 PROCEDURE — 1159F MED LIST DOCD IN RCRD: CPT | Mod: CPTII,S$GLB,, | Performed by: FAMILY MEDICINE

## 2022-12-12 PROCEDURE — 1125F PR PAIN SEVERITY QUANTIFIED, PAIN PRESENT: ICD-10-PCS | Mod: CPTII,S$GLB,, | Performed by: FAMILY MEDICINE

## 2022-12-12 RX ORDER — ALPRAZOLAM 0.25 MG/1
0.25 TABLET ORAL 3 TIMES DAILY PRN
Qty: 60 TABLET | Refills: 2 | Status: SHIPPED | OUTPATIENT
Start: 2022-12-12 | End: 2023-10-17 | Stop reason: SDUPTHER

## 2022-12-12 RX ORDER — TIZANIDINE 4 MG/1
4 TABLET ORAL EVERY 8 HOURS
Qty: 30 TABLET | Refills: 0 | Status: SHIPPED | OUTPATIENT
Start: 2022-12-12 | End: 2022-12-22

## 2022-12-12 NOTE — PROGRESS NOTES
Casie Frias Givens  2022  [id number]    Rossana Ang MD  Patient Care Team:  Rossana Ang MD as PCP - General (Family Medicine)  Radha Foley LPN as Care Coordinator (Internal Medicine)  Rita Sawant, PharmD as Pharmacist (Oncology)  Gerri Meyers RD (Inactive) as Dietitian (Nutrition)  Jessica Brink, PharmD as Hypertension Digital Medicine Clinician (Pharmacist)  Rossana Ang MD as Hypertension Digital Medicine Responsible Provider (Family Medicine)  Madelaine Kraus as Digital Medicine Health           Visit Type:an urgent visit for an existing problem     Chief Complaint:  Chief Complaint   Patient presents with    Follow-up     From urgent care       History of Present Illness:  Went to [facility] ER on Dec 6 for conjunctivits and upper back pain    She was given kelfex (she was seen 2 days prior at  for same c/o on drops.)  She also is c/o right upper back pain    ER told her to follow up with optho    She does have HTN.  She is on Norvasc 10, and Benicar HCTZ, with atenolol.    She last saw her GYNONC in Sept.   Bilateral ovarian can with peritoneal carinomatosis.  HAs the CT in Sept with   Dr. Juarez reviewed and didn't see any changes.  Mention of small node and pancreatic node, but he reports just to watch per his recommendations.    History:  Past Medical History:   Diagnosis Date    Anemia     Anxiety     Arthritis     knees    Cancer     ovarian    CHF (congestive heart failure)     Hx antineoplastic chemotherapy     last 2019    Hyperlipemia     Hypertension     Neck pain     Ovarian cancer 2019    CHEMO    Peritoneal carcinomatosis 2019     Past Surgical History:   Procedure Laterality Date    breast reduction  10/02/2018     SECTION      X 1    COLONOSCOPY N/A 2021    Procedure: COLONOSCOPY;  Surgeon: Paulette Rojas MD;  Location: [facility];  Service: Gastroenterology;  Laterality: N/A;    CYSTOSCOPY W/ URETERAL STENT PLACEMENT Right 2019     Procedure: CYSTOSCOPY, WITH URETERAL STENT INSERTION;  Surgeon: Timmy Santiago IV, MD;  Location: [facility] OR;  Service: Urology;  Laterality: Right;    CYSTOSCOPY W/ URETERAL STENT REMOVAL  10/04/2019    DILATION AND CURETTAGE OF UTERUS      HYSTERECTOMY      RALH for fibroids (still has ovaries)    INSERTION OF VENOUS ACCESS PORT Left 2019    Procedure: INSERTION, VENOUS ACCESS PORT;  Surgeon: Ulisses Monzon MD;  Location: [facility] OR;  Service: General;  Laterality: Left;  Left internal jugular     LYSIS OF ADHESIONS OF URETER N/A 8/15/2019    Procedure: URETEROLYSIS;  Surgeon: Ismael Juarez MD;  Location: [facility] OR;  Service: OB/GYN;  Laterality: N/A;    OMENTECTOMY N/A 8/15/2019    Procedure: OMENTECTOMY;  Surgeon: Ismael Juarez MD;  Location: [facility];  Service: OB/GYN;  Laterality: N/A;    RETROGRADE PYELOGRAPHY Right 2019    Procedure: PYELOGRAM, RETROGRADE;  Surgeon: Timmy Santiago IV, MD;  Location: [facility] OR;  Service: Urology;  Laterality: Right;    ROBOT-ASSISTED LAPAROSCOPIC SALPINGO-OOPHORECTOMY USING DA TIA XI Bilateral 8/15/2019    Procedure: XI ROBOTIC SALPINGO-OOPHORECTOMY;  Surgeon: Ismael Juarez MD;  Location: [facility];  Service: OB/GYN;  Laterality: Bilateral;    TOTAL REDUCTION MAMMOPLASTY  2018    TUBAL LIGATION       Family History   Problem Relation Age of Onset    Anesthesia problems Other     Breast cancer Maternal Aunt     Breast cancer Paternal Aunt     Ovarian cancer Paternal Aunt     Colon cancer Brother     Thrombophilia Neg Hx      Social History     Socioeconomic History    Marital status:    Tobacco Use    Smoking status: Former     Packs/day: 1.00     Years: 25.00     Pack years: 25.00     Types: Cigarettes     Quit date: 10/1/2018     Years since quittin.2    Smokeless tobacco: Never    Tobacco comments:     States started quit 2 months ago after 30 years   Substance and Sexual Activity    Alcohol use: Never     Alcohol/week: 0.0 standard drinks     Comment:  occasionally  No alcohol 72h prior to sx    Drug use: No    Sexual activity: Not Currently     Partners: Male     Comment: hyst; mut monog   Social History Narrative    Live w/ spouse and 2 dogs      Patient Active Problem List   Diagnosis    Hypertension    Osteoarthritis of both knees    History of CHF (congestive heart failure)    Hyperlipidemia    Peritoneal carcinomatosis    Morbid obesity    Status post placement of ureteral stent    Abnormal ECG    Pulmonary HTN    Ovarian cancer, bilateral    S/P BSO (bilateral salpingo-oophorectomy)    Encounter for antineoplastic chemotherapy    Anxiety    DDD (degenerative disc disease), cervical    Neck muscle weakness    Family history of colon cancer     Review of patient's allergies indicates:  No Known Allergies    The following were reviewed at this visit: active problem list, medication list, allergies, family history, social history, and health maintenance.    Medications:  Current Outpatient Medications on File Prior to Visit   Medication Sig Dispense Refill    albuterol 90 mcg/actuation inhaler Inhale 2 puffs into the lungs every 4 (four) hours as needed for Wheezing. Rescue 18 g 0    ALPRAZolam (XANAX) 0.25 MG tablet Take 1 tablet (0.25 mg total) by mouth 3 (three) times daily as needed for Anxiety. 60 tablet 2    amLODIPine (NORVASC) 5 MG tablet Take 2 tablets (10 mg total) by mouth once daily. 60 tablet 11    atenoloL (TENORMIN) 25 MG tablet Take 1 tablet (25 mg total) by mouth once daily. 90 tablet 3    azelastine (ASTELIN) 137 mcg (0.1 %) nasal spray 1 spray (137 mcg total) by Nasal route 2 (two) times daily. 30 mL 0    cetirizine (ZYRTEC) 10 MG tablet Take 1 tablet (10 mg total) by mouth once daily. 30 tablet 5    diclofenac sodium (VOLTAREN) 1 % Gel Apply once a day to back as needed 100 g 1    EPINEPHrine (EPIPEN) 0.3 mg/0.3 mL AtIn Inject 0.3 mLs (0.3 mg total) into the muscle once. for 1 dose 2 each 0    fluticasone propionate (FLONASE) 50  mcg/actuation nasal spray 1 spray (50 mcg total) by Each Nostril route every 12 (twelve) hours as needed for Rhinitis. 16 g 0    gabapentin (NEURONTIN) 100 MG capsule Take 1 capsule (100 mg total) by mouth 3 (three) times daily. 90 capsule 11    ginkgo biloba 40 mg Tab Take 40 mg by mouth.      hydrOXYzine HCL (ATARAX) 25 MG tablet Take 1 tablet (25 mg total) by mouth 3 (three) times daily as needed for Itching. 90 tablet 1    moxifloxacin (VIGAMOX) 0.5 % ophthalmic solution Place 1 drop into the left eye every 8 (eight) hours while awake. for 7 days 3 mL 0    multivitamin with minerals tablet Take 1 tablet by mouth once daily.       olmesartan-hydrochlorothiazide (BENICAR HCT) 40-25 mg per tablet Take 1 tablet by mouth once daily. 90 tablet 1    rosuvastatin (CRESTOR) 10 MG tablet Take 1 tablet (10 mg total) by mouth once daily. 90 tablet 1    sertraline (ZOLOFT) 25 MG tablet Take 1 tablet (25 mg total) by mouth once daily. 90 tablet 3    zinc gluconate 50 mg tablet Take 50 mg by mouth once daily.      biotin 1 mg tablet Take 1,000 mcg by mouth once daily.       cephALEXin (KEFLEX) 500 MG capsule Take 1 capsule (500 mg total) by mouth 4 (four) times daily. for 7 days (Patient not taking: Reported on 12/12/2022) 28 capsule 0    sumatriptan (IMITREX) 50 MG tablet Take 1 tablet (50 mg total) by mouth every 4 to 6 hours as needed for Migraine. (Patient not taking: Reported on 12/12/2022) 30 tablet 1    [DISCONTINUED] amoxicillin (AMOXIL) 875 MG tablet Take 1 tablet (875 mg total) by mouth every 12 (twelve) hours. (Patient not taking: Reported on 4/14/2022) 14 tablet 0    [DISCONTINUED] azithromycin (Z-ROSE) 250 MG tablet Take 2 tablets by mouth on day 1; Take 1 tablet by mouth on days 2-5 (Patient not taking: Reported on 8/25/2022) 6 tablet 0    [DISCONTINUED] polyethylene glycol (GLYCOLAX) 17 gram/dose powder Take 17 g by mouth daily as needed (constipation). (Patient not taking: Reported on 8/25/2022) 510 g 0     No  current facility-administered medications on file prior to visit.       Medications have been reviewed and reconciled with patient at this visit.  Barriers to medications reviewed with patient.    Adverse reactions to current medications reviewed with patient..    Over the counter medications reviewed and reconciled with patient.    Exam:  Wt Readings from Last 3 Encounters:   12/12/22 115.7 kg (255 lb 1.2 oz)   12/06/22 116.4 kg (256 lb 9.9 oz)   12/04/22 115.2 kg (254 lb)     Temp Readings from Last 3 Encounters:   12/12/22 97 °F (36.1 °C) (Tympanic)   12/06/22 98.5 °F (36.9 °C) (Oral)   12/04/22 98.7 °F (37.1 °C)     BP Readings from Last 3 Encounters:   12/12/22 120/70   12/06/22 (!) 160/86   12/04/22 139/67     Pulse Readings from Last 3 Encounters:   12/12/22 75   12/06/22 87   12/04/22 74     Body mass index is 39.95 kg/m².      Review of Systems   Eyes:  Positive for pain, discharge and redness.   Musculoskeletal:  Positive for myalgias.   Physical Exam  Nursing note reviewed.   HENT:      Nose: Nose normal.      Mouth/Throat:      Mouth: Mucous membranes are moist.   Eyes:      General: Lids are normal.      Conjunctiva/sclera:      Right eye: Right conjunctiva is injected. No exudate or hemorrhage.     Comments: Cholesterol plaque?   Cardiovascular:      Rate and Rhythm: Normal rate and regular rhythm.   Pulmonary:      Effort: Pulmonary effort is normal. No respiratory distress.   Musculoskeletal:      Thoracic back: Spasms and tenderness present.        Back:    Neurological:      Mental Status: She is alert and oriented to person, place, and time.   Psychiatric:         Mood and Affect: Mood normal.         Behavior: Behavior normal.         Thought Content: Thought content normal.         Judgment: Judgment normal.       Laboratory Reviewed ({Yes)  Lab Results   Component Value Date    WBC 8.42 08/25/2022    HGB 12.0 08/25/2022    HCT 38.7 08/25/2022     08/25/2022    CHOL 163 01/06/2022    TRIG  71 01/06/2022    HDL 51 01/06/2022    ALT 20 08/25/2022    AST 19 08/25/2022     08/25/2022    K 3.5 08/25/2022     08/25/2022    CREATININE 0.8 09/29/2022    BUN 9 08/25/2022    CO2 27 08/25/2022    TSH 1.869 04/19/2013    INR 1.0 01/28/2019       Casie was seen today for follow-up.    Diagnoses and all orders for this visit:    Conjunctivitis, unspecified conjunctivitis type, unspecified laterality    Upper back pain on right side  -     X-Ray Chest PA And Lateral; Future  -     tiZANidine (ZANAFLEX) 4 MG tablet; Take 1 tablet (4 mg total) by mouth every 8 (eight) hours. for 10 days        Finish drops  See Optho wed   Some improvement    Tx pain as MKS, but will check chest xray        Care Plan/Goals: Reviewed    Goals          Patient Stated      Blood Pressure < 130/80 (pt-stated)        Other      Exercise at least 150 minutes per week.       Maintain a low sodium diet       American Heart Association (AHA) guidelines recommend less than 1500 mg of dietary salt per day.        Take at least one BP reading per week at various times of the day             Follow up: No follow-ups on file.    After visit summary was printed and given to patient upon discharge today.  Patient goals and care plan are included in After Visit Summary.

## 2022-12-12 NOTE — TELEPHONE ENCOUNTER
No new care gaps identified.  [facility] Embedded Care Gaps. Reference number: [id number]. 12/12/2022   11:24:02 AM CST

## 2022-12-13 ENCOUNTER — PATIENT MESSAGE (OUTPATIENT)
Dept: INTERNAL MEDICINE | Facility: CLINIC | Age: 67
End: 2022-12-13
Payer: MEDICARE

## 2022-12-13 DIAGNOSIS — M54.9 UPPER BACK PAIN ON RIGHT SIDE: Primary | ICD-10-CM

## 2022-12-14 ENCOUNTER — OFFICE VISIT (OUTPATIENT)
Dept: OPHTHALMOLOGY | Facility: CLINIC | Age: 67
End: 2022-12-14
Payer: MEDICARE

## 2022-12-14 ENCOUNTER — PATIENT MESSAGE (OUTPATIENT)
Dept: INTERNAL MEDICINE | Facility: CLINIC | Age: 67
End: 2022-12-14
Payer: MEDICARE

## 2022-12-14 ENCOUNTER — LAB VISIT (OUTPATIENT)
Dept: LAB | Facility: HOSPITAL | Age: 67
End: 2022-12-14
Attending: FAMILY MEDICINE
Payer: MEDICARE

## 2022-12-14 DIAGNOSIS — H10.13 ALLERGIC CONJUNCTIVITIS, BILATERAL: Primary | ICD-10-CM

## 2022-12-14 DIAGNOSIS — M54.9 UPPER BACK PAIN ON RIGHT SIDE: ICD-10-CM

## 2022-12-14 DIAGNOSIS — C56.3 OVARIAN CANCER, BILATERAL: ICD-10-CM

## 2022-12-14 LAB
ALBUMIN SERPL BCP-MCNC: 3.6 G/DL (ref 3.5–5.2)
ALP SERPL-CCNC: 129 U/L (ref 55–135)
ALT SERPL W/O P-5'-P-CCNC: 23 U/L (ref 10–44)
ANION GAP SERPL CALC-SCNC: 11 MMOL/L (ref 8–16)
AST SERPL-CCNC: 24 U/L (ref 10–40)
BASOPHILS # BLD AUTO: 0.1 K/UL (ref 0–0.2)
BASOPHILS NFR BLD: 1.1 % (ref 0–1.9)
BILIRUB SERPL-MCNC: 0.4 MG/DL (ref 0.1–1)
BUN SERPL-MCNC: 18 MG/DL (ref 8–23)
CALCIUM SERPL-MCNC: 9.7 MG/DL (ref 8.7–10.5)
CANCER AG125 SERPL-ACNC: 7 U/ML (ref 0–30)
CHLORIDE SERPL-SCNC: 106 MMOL/L (ref 95–110)
CO2 SERPL-SCNC: 26 MMOL/L (ref 23–29)
CREAT SERPL-MCNC: 0.8 MG/DL (ref 0.5–1.4)
DIFFERENTIAL METHOD: ABNORMAL
EOSINOPHIL # BLD AUTO: 0.2 K/UL (ref 0–0.5)
EOSINOPHIL NFR BLD: 2.5 % (ref 0–8)
ERYTHROCYTE [DISTWIDTH] IN BLOOD BY AUTOMATED COUNT: 14.6 % (ref 11.5–14.5)
EST. GFR  (NO RACE VARIABLE): >60 ML/MIN/1.73 M^2
GLUCOSE SERPL-MCNC: 91 MG/DL (ref 70–110)
HCT VFR BLD AUTO: 37.3 % (ref 37–48.5)
HGB BLD-MCNC: 11.8 G/DL (ref 12–16)
IMM GRANULOCYTES # BLD AUTO: 0.02 K/UL (ref 0–0.04)
IMM GRANULOCYTES NFR BLD AUTO: 0.2 % (ref 0–0.5)
LYMPHOCYTES # BLD AUTO: 3.4 K/UL (ref 1–4.8)
LYMPHOCYTES NFR BLD: 38.2 % (ref 18–48)
MCH RBC QN AUTO: 28.4 PG (ref 27–31)
MCHC RBC AUTO-ENTMCNC: 31.6 G/DL (ref 32–36)
MCV RBC AUTO: 90 FL (ref 82–98)
MONOCYTES # BLD AUTO: 0.6 K/UL (ref 0.3–1)
MONOCYTES NFR BLD: 6.4 % (ref 4–15)
NEUTROPHILS # BLD AUTO: 4.5 K/UL (ref 1.8–7.7)
NEUTROPHILS NFR BLD: 51.6 % (ref 38–73)
NRBC BLD-RTO: 0 /100 WBC
PLATELET # BLD AUTO: 219 K/UL (ref 150–450)
PMV BLD AUTO: 11.2 FL (ref 9.2–12.9)
POTASSIUM SERPL-SCNC: 3.8 MMOL/L (ref 3.5–5.1)
PROT SERPL-MCNC: 6.7 G/DL (ref 6–8.4)
RBC # BLD AUTO: 4.15 M/UL (ref 4–5.4)
SODIUM SERPL-SCNC: 143 MMOL/L (ref 136–145)
WBC # BLD AUTO: 8.76 K/UL (ref 3.9–12.7)

## 2022-12-14 PROCEDURE — 86304 IMMUNOASSAY TUMOR CA 125: CPT | Performed by: OBSTETRICS & GYNECOLOGY

## 2022-12-14 PROCEDURE — 99999 PR PBB SHADOW E&M-EST. PATIENT-LVL III: ICD-10-PCS | Mod: PBBFAC,,, | Performed by: OPTOMETRIST

## 2022-12-14 PROCEDURE — 85025 COMPLETE CBC W/AUTO DIFF WBC: CPT | Performed by: FAMILY MEDICINE

## 2022-12-14 PROCEDURE — 92002 PR EYE EXAM, NEW PATIENT,INTERMED: ICD-10-PCS | Mod: S$GLB,,, | Performed by: OPTOMETRIST

## 2022-12-14 PROCEDURE — 36415 COLL VENOUS BLD VENIPUNCTURE: CPT | Performed by: OBSTETRICS & GYNECOLOGY

## 2022-12-14 PROCEDURE — 1159F PR MEDICATION LIST DOCUMENTED IN MEDICAL RECORD: ICD-10-PCS | Mod: CPTII,S$GLB,, | Performed by: OPTOMETRIST

## 2022-12-14 PROCEDURE — 99999 PR PBB SHADOW E&M-EST. PATIENT-LVL III: CPT | Mod: PBBFAC,,, | Performed by: OPTOMETRIST

## 2022-12-14 PROCEDURE — 1159F MED LIST DOCD IN RCRD: CPT | Mod: CPTII,S$GLB,, | Performed by: OPTOMETRIST

## 2022-12-14 PROCEDURE — 92002 INTRM OPH EXAM NEW PATIENT: CPT | Mod: S$GLB,,, | Performed by: OPTOMETRIST

## 2022-12-14 PROCEDURE — 80053 COMPREHEN METABOLIC PANEL: CPT | Performed by: FAMILY MEDICINE

## 2022-12-14 RX ORDER — PREDNISOLONE ACETATE 10 MG/ML
1 SUSPENSION/ DROPS OPHTHALMIC EVERY 4 HOURS
Qty: 5 ML | Refills: 0 | Status: SHIPPED | OUTPATIENT
Start: 2022-12-14 | End: 2022-12-22

## 2022-12-14 NOTE — PROGRESS NOTES
HPI     Eye Problem     Additional comments: Pt states scratchy feeling started in OS, but now in   OU  Pt states cant take meds that ER prescribed  Pt is still using gtts, but not this am.   Vigamox every 8 hours  Pt notices a film over OS           Comments    Pain Scale:  2  Onset:   2 weeks ago  OD, OS, OU:   OU  Discharge:   yes  A.M. Matting:  no  Itch:   yes  Redness:   yes  Photophobia:   not at the moment  Foreign body sensation:   no  Deep pain:   no  Previous occurrence:   no  Drops:   yes           Last edited by Carly Chacon on 12/14/2022 11:04 AM.            Assessment /Plan     For exam results, see Encounter Report.    Allergic conjunctivitis, bilateral  -     prednisoLONE acetate (PRED FORTE) 1 % DrpS; Place 1 drop into both eyes every 4 (four) hours. for 5 days  Dispense: 5 mL; Refill: 0      Start Pataday bid OU  Start PF q4h OU x 5 days    RTC full exam in 2-3 weeks

## 2022-12-15 ENCOUNTER — PATIENT MESSAGE (OUTPATIENT)
Dept: INTERNAL MEDICINE | Facility: CLINIC | Age: 67
End: 2022-12-15
Payer: MEDICARE

## 2022-12-15 DIAGNOSIS — M54.9 UPPER BACK PAIN ON RIGHT SIDE: Primary | ICD-10-CM

## 2022-12-21 ENCOUNTER — CLINICAL SUPPORT (OUTPATIENT)
Dept: REHABILITATION | Facility: HOSPITAL | Age: 67
End: 2022-12-21
Payer: MEDICARE

## 2022-12-21 DIAGNOSIS — M54.9 UPPER BACK PAIN ON RIGHT SIDE: ICD-10-CM

## 2022-12-21 DIAGNOSIS — M54.6 ACUTE RIGHT-SIDED THORACIC BACK PAIN: ICD-10-CM

## 2022-12-21 DIAGNOSIS — M54.50 ACUTE RIGHT-SIDED LOW BACK PAIN WITHOUT SCIATICA: ICD-10-CM

## 2022-12-21 PROBLEM — M53.82 NECK MUSCLE WEAKNESS: Status: RESOLVED | Noted: 2020-09-25 | Resolved: 2022-12-21

## 2022-12-21 PROCEDURE — 97161 PT EVAL LOW COMPLEX 20 MIN: CPT | Mod: PN

## 2022-12-21 PROCEDURE — 97110 THERAPEUTIC EXERCISES: CPT | Mod: PN

## 2022-12-21 NOTE — PLAN OF CARE
"[facility]   Physical Therapy Initial Evaluation     Date: 12/21/2022   Name: Casie Frias [facility]  Clinic Number: [id number]    Therapy Diagnosis:   Encounter Diagnosis   Name Primary?    Upper back pain on right side      Physician: Rossana Ang MD    Physician Orders: PT Eval and Treat   Medical Diagnosis from Referral: M54.9 (ICD-10-CM) - Upper back pain on right side   Evaluation Date: 12/21/2022  Authorization Period Expiration: 12/31/2022  Plan of Care Expiration: 02/01/2023  Progress Note Due: 10th visit  Visit # / Visits authorized: 1/ 1   FOTO: 1/3    Precautions: Standard     Time In: 9:35am  Time Out: 10:20am  Total Appointment Time (timed & untimed codes): 45 minutes      SUBJECTIVE     Date of onset: End of November    History of current condition - Casie reports: that she has been having pain in the lower right side of the back and it comes up the back.  If she walks a certain way she will feel a "catch" in the side.  She has been trying heat but that hasn't helped.  She has pain in the mid-lower back area on the R side. She can only turn her body a certain way but then it starts to hurt.  She noticed when she was cooking for Thanksgiving dinner it was starting to hurt.     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      Medical History:   Past Medical History:   Diagnosis Date    Anemia     Anxiety     Arthritis     knees    Cancer     ovarian    CHF (congestive heart failure)     Hx antineoplastic chemotherapy     last " 2019    Hyperlipemia     Hypertension     Neck pain     Ovarian cancer 2019    CHEMO    Peritoneal carcinomatosis 2019       Surgical History:   Casie Gaines  has a past surgical history that includes  section; Dilation and curettage of uterus; Hysterectomy; Tubal ligation; breast reduction (10/02/2018); Cystoscopy w/ ureteral stent placement (Right, 2019); Retrograde pyelography (Right, 2019); Insertion of venous access port (Left, 2019); Total Reduction Mammoplasty (); Robot-assisted laparoscopic salpingo-oophorectomy using da Laisha Xi (Bilateral, 8/15/2019); Omentectomy (N/A, 8/15/2019); Lysis of adhesions of ureter (N/A, 8/15/2019); Cystoscopy w/ ureteral stent removal (10/04/2019); and Colonoscopy (N/A, 2021).    Medications:   Casie has a current medication list which includes the following prescription(s): albuterol, alprazolam, amlodipine, atenolol, azelastine, biotin, cetirizine, diclofenac sodium, epinephrine, fluticasone propionate, gabapentin, ginkgo biloba, hydroxyzine hcl, moxifloxacin, multivitamin with minerals, olmesartan-hydrochlorothiazide, prednisolone acetate, rosuvastatin, sertraline, sumatriptan, tizanidine, and zinc gluconate.    Allergies:   Review of patient's allergies indicates:  No Known Allergies       OBJECTIVE   Posture Observation:  Forward trunk posturing and slight lateral shift to the left in the trunk.      Gait observation:  Decreased brie and decreased arm swing    Palpation:  very tender to palpation over thoracic and lumbar parapsinals on the R side along with quadratus lumborum.  Pain with PA mobs to lower thoracic and lumbar spine.     AROM:    Thoracolumbar  (single inclinometer at L4/5) AROM  (degrees) Pain/Dysfunction with Movement   Flexion 50% Pain in the R side of back   Extension 50% Pain in the R side of back   Right side bending 50% Pain in the R side of back   Left side bending 50% Pain in the R side of back   Right  rotation 50% Pain in the R side of back   Left rotation 50% Pain in the R side of back     AROM: B hips and knees WNL in all directions    Strength:     L/E MMT Right Left Pain/Dysfunction with Movement   Hip Flexion 4+/5 4+/5    Hip Extension 4-/5 4-/5    Hip abduction 4-/5 4+/5    Hip adduction 4-/5 4+/5    Hip IR 4+/5 4+/5    Hip ER 4+/5 4+/5    Knee Flexion 4+/5 4+/5    Knee Extension 4+/5 4+/5    Ankle DF 4+/5 4+/5    Ankle PF 4/5 4/5           Limitation/Restriction for FOTO Thoracic Spine Survey    Therapist reviewed FOTO scores for Casie Gaines on 12/21/2022.   FOTO documents entered into [employer] - see Media section.    Limitation Score: 65%         TREATMENT     Total Treatment time (time-based codes) separate from Evaluation: 20 minutes      Casie received the treatments listed below:      therapeutic exercises to develop strength, endurance, ROM, flexibility, posture, and core stabilization for 10 minutes including:  SKTC  LTR        supervised modalities after being cleared for contradictions: IFC Electrical Stimulation:  Casie received IFC Electrical Stimulation for pain control applied to the R side of the mid-low back area. Pt received stimulation for 15 minutes. Casie tolderated treatment well without any adverse effects.      hot pack for 15 minutes to back during IFC.          PATIENT EDUCATION AND HOME EXERCISES     Education provided:   - HEP and plan of care    Written Home Exercises Provided: yes. Exercises were reviewed and Casie was able to demonstrate them prior to the end of the session.  Casie demonstrated good  understanding of the education provided. See EMR under Patient Instructions for exercises provided during therapy sessions.    ASSESSMENT     Casie is a 67 y.o. female referred to outpatient Physical Therapy with a medical diagnosis of R upper back pain. Patient presents with pain in the R side of the mid-low back area, decreased thoraco-lumbar spine, decreased core and hip  strength, difficulty with transfers, and difficulty with doing daily activities.     Patient prognosis is Good.   Patient will benefit from skilled outpatient Physical Therapy to address the deficits stated above and in the chart below, provide patient /family education, and to maximize patientt's level of independence.     Plan of care discussed with patient: Yes  Patient's spiritual, cultural and educational needs considered and patient is agreeable to the plan of care and goals as stated below:     Anticipated Barriers for therapy: none    Medical Necessity is demonstrated by the following  History  Co-morbidities and personal factors that may impact the plan of care Co-morbidities:   See above PMHx    Personal Factors:   no deficits     moderate   Examination  Body Structures and Functions, activity limitations and participation restrictions that may impact the plan of care Body Regions:   back    Body Systems:    ROM  strength  gross coordinated movement  balance  gait    Participation Restrictions:   none    Activity limitations:   Learning and applying knowledge  no deficits    General Tasks and Commands  no deficits    Communication  no deficits    Mobility  lifting and carrying objects  walking    Self care  washing oneself (bathing, drying, washing hands)  caring for body parts (brushing teeth, shaving, grooming)  toileting  dressing    Domestic Life  shopping  cooking  doing house work (cleaning house, washing dishes, laundry)    Interactions/Relationships  no deficits    Life Areas  employment    Community and Social Life  community life  recreation and leisure         high   Clinical Presentation stable and uncomplicated low   Decision Making/ Complexity Score: low     Goals:  Short Term Goals:    Pt will be 50% independent with HEP.     Long Term Goals: 6 weeks   Pt will be 100% independent with HEP  Pt will be able be able to perform her normal household activities without difficulty.  Pt will be able  to perform all tranfers without difficulty.  Pt will be able to walk several blocks without difficulty.     PLAN   Plan of care Certification: 12/21/2022 to 02/01/2023.    Outpatient Physical Therapy 2 times weekly for 6 weeks to include the following interventions: Cervical/Lumbar Traction, Electrical Stimulation PRN, Manual Therapy, Moist Heat/ Ice, Neuromuscular Re-ed, Patient Education, Self Care, Therapeutic Activities, Therapeutic Exercise, and FDN .     Renea Bhandari, PT      I CERTIFY THE NEED FOR THESE SERVICES FURNISHED UNDER THIS PLAN OF TREATMENT AND WHILE UNDER MY CARE   Physician's comments:     Physician's Signature: ___________________________________________________

## 2022-12-27 ENCOUNTER — CLINICAL SUPPORT (OUTPATIENT)
Dept: REHABILITATION | Facility: HOSPITAL | Age: 67
End: 2022-12-27
Payer: MEDICARE

## 2022-12-27 DIAGNOSIS — M54.50 ACUTE RIGHT-SIDED LOW BACK PAIN WITHOUT SCIATICA: ICD-10-CM

## 2022-12-27 DIAGNOSIS — M54.6 ACUTE RIGHT-SIDED THORACIC BACK PAIN: Primary | ICD-10-CM

## 2022-12-27 PROCEDURE — 97110 THERAPEUTIC EXERCISES: CPT | Mod: PN

## 2022-12-27 NOTE — PROGRESS NOTES
[facility]   Physical Therapy Treatment Note     Name: Casie Frias Indios  Clinic Number: [id number]    Therapy Diagnosis:   Encounter Diagnoses   Name Primary?    Acute right-sided thoracic back pain Yes    Acute right-sided low back pain without sciatica      Physician: Rossana Ang MD    Visit Date: 12/27/2022    Physician Orders: PT Eval and Treat   Medical Diagnosis from Referral: M54.9 (ICD-10-CM) - Upper back pain on right side   Evaluation Date: 12/21/2022  Authorization Period Expiration: 12/31/2022  Plan of Care Expiration: 02/01/2023  Progress Note Due: 10th visit  Visit # / Visits authorized: 1/ 1   FOTO: 1/3    PTA Visit #: 0/5     Time In: 10:45  Time Out:11:25  Total Billable Time: 24 minutes    SUBJECTIVE     Pt reports: having continued pain in R mid and R lower back.  She was compliant with home exercise program.  Response to previous treatment: cont. Pain   Functional change: none noted, discomfort with rolling to Left and right  in bed    Pain: not reported, moderate level  Location:  R LB     OBJECTIVE     Objective Measures updated at progress report unless specified.     Treatment     Casie received the treatments listed below:      therapeutic exercises to develop strength, flexibility, and core stabilization for 24 minutes including:  Nu Step x 5 min for joint mobility  Abdominal bracing  Modified plank with feet on mat  x 10 sec. Holds  Cat camels  Hand heel rock- discomfort  B SL open books      manual therapy techniques x 8 min.  R lumbar gapping x 4 min.  R post. Ilium rot mob x 4 min.        hot pack for 10 minutes to lumbosacral region.        Patient Education and Home Exercises     Home Exercises Provided and Patient Education Provided     Education provided:   - HEP, condition, activity    Written Home Exercises Provided: yes. Exercises were reviewed and Casie was able to demonstrate them prior to the end of the session.  Casie demonstrated good   understanding of the education provided. See EMR under Patient Instructions for exercises provided during therapy sessions    ASSESSMENT     Pt. Noted to have discomfort with lumbar central P-A mobs and R unilateral P-A mobs T10-L5.  Pt. Noted to have discomfort in R SI region and + R compression test.  Pt. Has leg length discrepancy noted but corrected with ilium rot mob.  Pt. Educated on spinal flexibility exercises and force closure/ lumbar stabilization exercises    Casie Is progressing well towards her goals.   Pt prognosis is Excellent.     Pt will continue to benefit from skilled outpatient physical therapy to address the deficits listed in the problem list box on initial evaluation, provide pt/family education and to maximize pt's level of independence in the home and community environment.     Pt's spiritual, cultural and educational needs considered and pt agreeable to plan of care and goals.     Anticipated barriers to physical therapy: none    Goals: Short Term Goals:    Pt will be 50% independent with HEP.      Long Term Goals: 6 weeks   Pt will be 100% independent with HEP  Pt will be able be able to perform her normal household activities without difficulty.  Pt will be able to perform all tranfers without difficulty.  Pt will be able to walk several blocks without difficulty.     PLAN   Plan of care Certification: 12/21/2022 to 02/01/2023.     Outpatient Physical Therapy 2 times weekly for 6 weeks to include the following interventions: Cervical/Lumbar Traction, Electrical Stimulation PRN, Manual Therapy, Moist Heat/ Ice, Neuromuscular Re-ed, Patient Education, Self Care, Therapeutic Activities, Therapeutic Exercise, and FDN .       Lulu Swan, PT

## 2022-12-29 ENCOUNTER — CLINICAL SUPPORT (OUTPATIENT)
Dept: REHABILITATION | Facility: HOSPITAL | Age: 67
End: 2022-12-29
Payer: MEDICARE

## 2022-12-29 DIAGNOSIS — M54.6 ACUTE RIGHT-SIDED THORACIC BACK PAIN: Primary | ICD-10-CM

## 2022-12-29 DIAGNOSIS — M54.50 ACUTE RIGHT-SIDED LOW BACK PAIN WITHOUT SCIATICA: ICD-10-CM

## 2022-12-29 PROCEDURE — 97014 ELECTRIC STIMULATION THERAPY: CPT | Mod: PN

## 2022-12-29 PROCEDURE — 97140 MANUAL THERAPY 1/> REGIONS: CPT | Mod: PN

## 2022-12-29 PROCEDURE — 97110 THERAPEUTIC EXERCISES: CPT | Mod: PN

## 2022-12-29 NOTE — PROGRESS NOTES
[facility]   Physical Therapy Treatment Note     Name: Casie Gaines  Clinic Number: [id number]    Therapy Diagnosis:   Encounter Diagnoses   Name Primary?    Acute right-sided thoracic back pain Yes    Acute right-sided low back pain without sciatica      Physician: Rossana Ang MD    Visit Date: 12/29/2022    Physician Orders: PT Eval and Treat   Medical Diagnosis from Referral: M54.9 (ICD-10-CM) - Upper back pain on right side   Evaluation Date: 12/21/2022  Authorization Period Expiration: 12/31/2022  Plan of Care Expiration: 02/01/2023  Progress Note Due: 10th visit  Visit # / Visits authorized: 2/ 10  FOTO: 1/3    PTA Visit #: 0/5     Time In: 1:10  Time Out: 1:55  Total Billable Time: 39 minutes    SUBJECTIVE     Pt reports: continued pain in R mid back.  She was compliant with home exercise program.  Response to previous treatment: no decrease in pain  Functional change: none noted    Pain: 8/10  Location: mid to lower thoracic pain      OBJECTIVE     Objective Measures updated at progress report unless specified.     Treatment     Casie received the treatments listed below:      Self-Care/ Home Management x 8 min.:  Pt. Educated on importance of anti- inflammatory diet to reduce arthritic pain and educated on inflammatory foods and inflammatory reducing foods    therapeutic exercises to develop strength, flexibility, and core stabilization for 23 minutes including:  Nu Step x 5 min for joint mobility  Abdominal bracing  Lumbar seated segmental flexion with Swiss Ball  B SL open books  Prone knee bends      manual therapy techniques x 8 min.    Cupping R thoracic and lumbar paraspinals and R sacral and R superior glut medius region x 8 min.       supervised modalities after being cleared for contradictions: TENS:  Casie received TENS electrical stimulation for pain to the thoracic and lumbar paraspinals bilat. Pt received continuous mode  15 minutes. Casie tolerated  treatment well without any adverse effects.     hot pack for 15 minutes to thoracic and lumbar spine/paraspinals.          Patient Education and Home Exercises     Home Exercises Provided and Patient Education Provided     Education provided:   - HEP, condition, activity    Written Home Exercises Provided: yes. Exercises were reviewed and Casie was able to demonstrate them prior to the end of the session.  Casie demonstrated good  understanding of the education provided. See EMR under Patient Instructions for exercises provided during therapy sessions    ASSESSMENT     Pt. Had tenderness with Lumbar central P-A mobs and Lower thoracic central P-A mobs at T9-T12.  Pt. Had discomfort R unilateral P-A mobs T10-T12.  Cupping performed to helped reduce discomfort in R thoracic, R lumbar, R sacral, and R gluteal region.  Pt. Reports having some relief but still expressed discomfort with rolling to left and right  and supine to sit and sit to supine transfers.  TENS and heat donned at end of treatment to reduce pain.  Pt. Educated on benefits of Healthy Back Program and agreeable to participate.      Casie Is progressing well towards her goals.   Pt prognosis is Excellent.     Pt will continue to benefit from skilled outpatient physical therapy to address the deficits listed in the problem list box on initial evaluation, provide pt/family education and to maximize pt's level of independence in the home and community environment.     Pt's spiritual, cultural and educational needs considered and pt agreeable to plan of care and goals.     Anticipated barriers to physical therapy: none    Goals:   Goals: Short Term Goals:    Pt will be 50% independent with HEP.      Long Term Goals: 6 weeks   Pt will be 100% independent with HEP  Pt will be able be able to perform her normal household activities without difficulty.  Pt will be able to perform all tranfers without difficulty.  Pt will be able to walk several blocks without  difficulty.     PLAN   Plan of care Certification: 12/21/2022 to 02/01/2023.     Outpatient Physical Therapy 2 times weekly for 6 weeks to include the following interventions: Cervical/Lumbar Traction, Electrical Stimulation PRN, Manual Therapy, Moist Heat/ Ice, Neuromuscular Re-ed, Patient Education, Self Care, Therapeutic Activities, Therapeutic Exercise, and FDN .       Lulu Swan, PT

## 2023-01-01 ENCOUNTER — OFFICE VISIT (OUTPATIENT)
Dept: GYNECOLOGIC ONCOLOGY | Facility: CLINIC | Age: 68
End: 2023-01-01
Payer: MEDICARE

## 2023-01-01 VITALS
DIASTOLIC BLOOD PRESSURE: 60 MMHG | BODY MASS INDEX: 39.47 KG/M2 | SYSTOLIC BLOOD PRESSURE: 131 MMHG | HEART RATE: 89 BPM | WEIGHT: 251.5 LBS | HEIGHT: 67 IN

## 2023-01-01 DIAGNOSIS — C78.6 PERITONEAL CARCINOMATOSIS: ICD-10-CM

## 2023-01-01 DIAGNOSIS — Z51.11 ENCOUNTER FOR ANTINEOPLASTIC CHEMOTHERAPY: Primary | ICD-10-CM

## 2023-01-01 DIAGNOSIS — C56.3 OVARIAN CANCER, BILATERAL: ICD-10-CM

## 2023-01-01 DIAGNOSIS — Z90.722 S/P BSO (BILATERAL SALPINGO-OOPHORECTOMY): ICD-10-CM

## 2023-01-01 PROCEDURE — 3008F BODY MASS INDEX DOCD: CPT | Mod: CPTII,S$GLB,, | Performed by: OBSTETRICS & GYNECOLOGY

## 2023-01-01 PROCEDURE — 1159F MED LIST DOCD IN RCRD: CPT | Mod: CPTII,S$GLB,, | Performed by: OBSTETRICS & GYNECOLOGY

## 2023-01-01 PROCEDURE — 1126F AMNT PAIN NOTED NONE PRSNT: CPT | Mod: CPTII,S$GLB,, | Performed by: OBSTETRICS & GYNECOLOGY

## 2023-01-01 PROCEDURE — 1157F ADVNC CARE PLAN IN RCRD: CPT | Mod: CPTII,S$GLB,, | Performed by: OBSTETRICS & GYNECOLOGY

## 2023-01-01 PROCEDURE — 3078F DIAST BP <80 MM HG: CPT | Mod: CPTII,S$GLB,, | Performed by: OBSTETRICS & GYNECOLOGY

## 2023-01-01 PROCEDURE — 99214 OFFICE O/P EST MOD 30 MIN: CPT | Mod: S$GLB,,, | Performed by: OBSTETRICS & GYNECOLOGY

## 2023-01-01 PROCEDURE — 3288F FALL RISK ASSESSMENT DOCD: CPT | Mod: CPTII,S$GLB,, | Performed by: OBSTETRICS & GYNECOLOGY

## 2023-01-01 PROCEDURE — 3075F SYST BP GE 130 - 139MM HG: CPT | Mod: CPTII,S$GLB,, | Performed by: OBSTETRICS & GYNECOLOGY

## 2023-01-01 PROCEDURE — 1101F PT FALLS ASSESS-DOCD LE1/YR: CPT | Mod: CPTII,S$GLB,, | Performed by: OBSTETRICS & GYNECOLOGY

## 2023-01-01 PROCEDURE — 1160F RVW MEDS BY RX/DR IN RCRD: CPT | Mod: CPTII,S$GLB,, | Performed by: OBSTETRICS & GYNECOLOGY

## 2023-01-01 PROCEDURE — 99999 PR PBB SHADOW E&M-EST. PATIENT-LVL IV: CPT | Mod: PBBFAC,,, | Performed by: OBSTETRICS & GYNECOLOGY

## 2023-01-05 ENCOUNTER — CLINICAL SUPPORT (OUTPATIENT)
Dept: REHABILITATION | Facility: HOSPITAL | Age: 68
End: 2023-01-05
Payer: MEDICARE

## 2023-01-05 DIAGNOSIS — M54.50 ACUTE RIGHT-SIDED LOW BACK PAIN WITHOUT SCIATICA: ICD-10-CM

## 2023-01-05 DIAGNOSIS — M54.6 ACUTE RIGHT-SIDED THORACIC BACK PAIN: Primary | ICD-10-CM

## 2023-01-05 PROCEDURE — 97110 THERAPEUTIC EXERCISES: CPT | Mod: PN

## 2023-01-05 PROCEDURE — 97014 ELECTRIC STIMULATION THERAPY: CPT | Mod: PN

## 2023-01-08 NOTE — PROGRESS NOTES
[facility]   Physical Therapy Treatment Note     Name: Casie Frias [facility]  Clinic Number: [id number]    Therapy Diagnosis:   Encounter Diagnoses   Name Primary?    Acute right-sided thoracic back pain Yes    Acute right-sided low back pain without sciatica      Physician: Rossana Ang MD    Visit Date: 1/5/2023    Physician Orders: PT Eval and Treat   Medical Diagnosis from Referral: M54.9 (ICD-10-CM) - Upper back pain on right side   Evaluation Date: 12/21/2022  Authorization Period Expiration: 12/31/2022  Plan of Care Expiration: 02/01/2023  Progress Note Due: 10th visit  Visit # / Visits authorized: 2/10; 1/20  FOTO: 1/3    PTA Visit #: 0/5     Time In: 1405  Time Out: 1445  Total Billable Time: 40 minutes    SUBJECTIVE     Pt reports: continued pain in R mid back.  She was compliant with home exercise program.  Response to previous treatment: no decrease in pain  Functional change: none noted    Pain: 8/10  Location: mid to lower thoracic pain      OBJECTIVE     Objective Measures updated at progress report unless specified.     Treatment     Casie received the treatments listed below:      Self-Care/ Home Management x --min.:  Pt. Educated on importance of anti- inflammatory diet to reduce arthritic pain and educated on inflammatory foods and inflammatory reducing foods    therapeutic exercises to develop strength, flexibility, and core stabilization for 40 minutes including:  MedEx extension 40# x20  MedEx rotation 0# x20  Nu Step x 5 min for joint mobility  Abdominal bracing  Pelvic tilting  Lumbar seated segmental flexion with Swiss Ball  B SL open books  Prone knee bends   Prone hip extension      manual therapy techniques x -- min.    Cupping R thoracic and lumbar paraspinals and R sacral and R superior glut medius region x --min.       supervised modalities after being cleared for contradictions: TENS:  Casie received TENS electrical stimulation for pain to the thoracic  Patient is calling to ask 's who she uses as her primary DrMaciif she would put an Echocardiogram order in Epic- in order for her to schedule a NP Cardiologist appt. Please call patient back today.   and lumbar paraspinals bilat. Pt received continuous mode  15 minutes. Casie tolerated treatment well without any adverse effects.     hot pack for 15 minutes to thoracic and lumbar spine/paraspinals.          Patient Education and Home Exercises     Home Exercises Provided and Patient Education Provided     Education provided:   - HEP, condition, activity    Written Home Exercises Provided: yes. Exercises were reviewed and Casie was able to demonstrate them prior to the end of the session.  Casie demonstrated good  understanding of the education provided. See EMR under Patient Instructions for exercises provided during therapy sessions    ASSESSMENT     The patient was instructed in and performed core and lowert extremity strengthening and range of motion.    Casie Is progressing well towards her goals.   Pt prognosis is Excellent.     Pt will continue to benefit from skilled outpatient physical therapy to address the deficits listed in the problem list box on initial evaluation, provide pt/family education and to maximize pt's level of independence in the home and community environment.     Pt's spiritual, cultural and educational needs considered and pt agreeable to plan of care and goals.     Anticipated barriers to physical therapy: none    Goals:   Goals: Short Term Goals:    Pt will be 50% independent with HEP.      Long Term Goals: 6 weeks   Pt will be 100% independent with HEP  Pt will be able be able to perform her normal household activities without difficulty.  Pt will be able to perform all tranfers without difficulty.  Pt will be able to walk several blocks without difficulty.     PLAN   Plan of care Certification: 12/21/2022 to 02/01/2023.     Outpatient Physical Therapy 2 times weekly for 6 weeks to include the following interventions: Cervical/Lumbar Traction, Electrical Stimulation PRN, Manual Therapy, Moist Heat/ Ice, Neuromuscular Re-ed, Patient Education, Self Care, Therapeutic Activities,  Therapeutic Exercise, and FDN .       Andres Martines, PT

## 2023-01-09 ENCOUNTER — CLINICAL SUPPORT (OUTPATIENT)
Dept: REHABILITATION | Facility: HOSPITAL | Age: 68
End: 2023-01-09
Payer: MEDICARE

## 2023-01-09 DIAGNOSIS — M54.6 ACUTE RIGHT-SIDED THORACIC BACK PAIN: Primary | ICD-10-CM

## 2023-01-09 DIAGNOSIS — M54.50 ACUTE RIGHT-SIDED LOW BACK PAIN WITHOUT SCIATICA: ICD-10-CM

## 2023-01-09 PROCEDURE — 97014 ELECTRIC STIMULATION THERAPY: CPT | Mod: PN

## 2023-01-09 PROCEDURE — 97110 THERAPEUTIC EXERCISES: CPT | Mod: PN

## 2023-01-09 NOTE — PROGRESS NOTES
[facility]   Physical Therapy Treatment Note     Name: Casie Michels  Clinic Number: [id number]    Therapy Diagnosis:   Encounter Diagnoses   Name Primary?    Acute right-sided thoracic back pain Yes    Acute right-sided low back pain without sciatica        Physician: Rossana Ang MD    Visit Date: 1/9/2023    Physician Orders: PT Eval and Treat   Medical Diagnosis from Referral: M54.9 (ICD-10-CM) - Upper back pain on right side   Evaluation Date: 12/21/2022  Authorization Period Expiration: 12/31/2022  Plan of Care Expiration: 02/01/2023  Progress Note Due: 10th visit  Visit # / Visits authorized: 2/10; 2/20  FOTO: 1/3    PTA Visit #: 0/5     Time In: 0930  Time Out: 1015  Total Billable Time: 45 minutes    SUBJECTIVE     Pt reports: back pain  She was compliant with home exercise program.  Response to previous treatment: no decrease in pain  Functional change: none noted    Pain: 0/10  Location: mid to lower thoracic pain      OBJECTIVE     Objective Measures updated at progress report unless specified.     Treatment     Casie received the treatments listed below:      Self-Care/ Home Management x --min.:  Pt. Educated on importance of anti- inflammatory diet to reduce arthritic pain and educated on inflammatory foods and inflammatory reducing foods    therapeutic exercises to develop strength, flexibility, and core stabilization for 45 minutes including:  MedEx extension 40# x20  MedEx rotation 20# x20  Nu Step x 5 min for joint mobility  Pelvic tilting 2x10  Lumbar seated segmental flexion with Swiss Ball  B SL open books 2x10  Prone quad stretch with strap 20 seconds x 3   Prone hip extension 2x10  Dynamic HSS 10 second x 5     manual therapy techniques x -- min.    Cupping R thoracic and lumbar paraspinals and R sacral and R superior glut medius region x --min.       supervised modalities after being cleared for contradictions: TENS:  Casie received TENS electrical  stimulation for pain to the thoracic and lumbar paraspinals bilat. Pt received continuous mode  15 minutes. Casie tolerated treatment well without any adverse effects.     hot pack for 15 minutes to thoracic and lumbar spine/paraspinals.          Patient Education and Home Exercises     Home Exercises Provided and Patient Education Provided     Education provided:   - HEP, condition, activity    Written Home Exercises Provided: yes. Exercises were reviewed and Caise was able to demonstrate them prior to the end of the session.  Casie demonstrated good  understanding of the education provided. See EMR under Patient Instructions for exercises provided during therapy sessions    ASSESSMENT     The patient is reporting no pain and ability to manage break through episodes.  She is progressing core strengthening and lumbar mobility    Casie Is progressing well towards her goals.   Pt prognosis is Excellent.     Pt will continue to benefit from skilled outpatient physical therapy to address the deficits listed in the problem list box on initial evaluation, provide pt/family education and to maximize pt's level of independence in the home and community environment.     Pt's spiritual, cultural and educational needs considered and pt agreeable to plan of care and goals.     Anticipated barriers to physical therapy: none    Goals:   Goals: Short Term Goals:    Pt will be 50% independent with HEP.      Long Term Goals: 6 weeks   Pt will be 100% independent with HEP  Pt will be able be able to perform her normal household activities without difficulty.  Pt will be able to perform all tranfers without difficulty.  Pt will be able to walk several blocks without difficulty.     PLAN   Plan of care Certification: 12/21/2022 to 02/01/2023.     Outpatient Physical Therapy 2 times weekly for 6 weeks to include the following interventions: Cervical/Lumbar Traction, Electrical Stimulation PRN, Manual Therapy, Moist Heat/ Ice,  Neuromuscular Re-ed, Patient Education, Self Care, Therapeutic Activities, Therapeutic Exercise, and FDN .       Andres Martines, PT

## 2023-01-11 ENCOUNTER — PATIENT MESSAGE (OUTPATIENT)
Dept: PHARMACY | Facility: CLINIC | Age: 68
End: 2023-01-11
Payer: MEDICARE

## 2023-01-12 ENCOUNTER — TELEPHONE (OUTPATIENT)
Dept: REHABILITATION | Facility: HOSPITAL | Age: 68
End: 2023-01-12
Payer: MEDICARE

## 2023-01-12 NOTE — TELEPHONE ENCOUNTER
Called pt. Secondary no show for appt.  Pt. Reports that she forgot about appt. And was in a meeting.  Pt. Educated on next appt. Date and time.  Pt. Reports that she will be at her next appt.

## 2023-01-17 ENCOUNTER — CLINICAL SUPPORT (OUTPATIENT)
Dept: REHABILITATION | Facility: HOSPITAL | Age: 68
End: 2023-01-17
Payer: MEDICARE

## 2023-01-17 DIAGNOSIS — M54.50 ACUTE RIGHT-SIDED LOW BACK PAIN WITHOUT SCIATICA: ICD-10-CM

## 2023-01-17 DIAGNOSIS — M54.6 ACUTE RIGHT-SIDED THORACIC BACK PAIN: Primary | ICD-10-CM

## 2023-01-17 PROCEDURE — 97110 THERAPEUTIC EXERCISES: CPT | Mod: PN

## 2023-01-18 NOTE — PROGRESS NOTES
[facility]   Physical Therapy Treatment Note     Name: Casie Gaines  Clinic Number: [id number]    Therapy Diagnosis:   Encounter Diagnoses   Name Primary?    Acute right-sided thoracic back pain Yes    Acute right-sided low back pain without sciatica        Physician: Rossana Ang MD    Visit Date: 1/17/2023    Physician Orders: PT Eval and Treat   Medical Diagnosis from Referral: M54.9 (ICD-10-CM) - Upper back pain on right side   Evaluation Date: 12/21/2022  Authorization Period Expiration: 12/31/2022  Plan of Care Expiration: 02/01/2023  Progress Note Due: 10th visit  Visit # / Visits authorized: 2/10; 10/20  FOTO: 1/3    PTA Visit #: 0/5     Time In: 0930  Time Out: 1015  Total Billable Time: 45 minutes    SUBJECTIVE     Pt reports: no pain today  She was compliant with home exercise program.  Response to previous treatment: no decrease in pain  Functional change: none noted    Pain: 0/10  Location: mid to lower thoracic pain      OBJECTIVE     Objective Measures updated at progress report unless specified.     Treatment     Casie received the treatments listed below:      Self-Care/ Home Management x --min.:  Pt. Educated on importance of anti- inflammatory diet to reduce arthritic pain and educated on inflammatory foods and inflammatory reducing foods    therapeutic exercises to develop strength, flexibility, and core stabilization for 45 minutes including:  MedEx extension 40# x20  MedEx rotation 20# x20  Nu Step x 5 min for joint mobility  Pelvic tilting 2x10  Lumbar seated segmental flexion with Swiss Ball  B SL open books 2x10  Prone quad stretch with strap 20 seconds x 3   Prone hip extension 2x10  Dynamic HSS 10 second x 5     manual therapy techniques x -- min.    Cupping R thoracic and lumbar paraspinals and R sacral and R superior glut medius region x --min.       supervised modalities after being cleared for contradictions: TENS:  Casie received TENS electrical  stimulation for pain to the thoracic and lumbar paraspinals bilat. Pt received continuous mode  -- minutes. Casie tolerated treatment well without any adverse effects.     hot pack for -- minutes to thoracic and lumbar spine/paraspinals.          Patient Education and Home Exercises     Home Exercises Provided and Patient Education Provided     Education provided:   - HEP, condition, activity    Written Home Exercises Provided: yes. Exercises were reviewed and Casie was able to demonstrate them prior to the end of the session.  Casie demonstrated good  understanding of the education provided. See EMR under Patient Instructions for exercises provided during therapy sessions    ASSESSMENT     The patient has no pain and will begin plans to discharge if painfree next visit    Casie Is progressing well towards her goals.   Pt prognosis is Excellent.     Pt will continue to benefit from skilled outpatient physical therapy to address the deficits listed in the problem list box on initial evaluation, provide pt/family education and to maximize pt's level of independence in the home and community environment.     Pt's spiritual, cultural and educational needs considered and pt agreeable to plan of care and goals.     Anticipated barriers to physical therapy: none    Goals:   Goals: Short Term Goals:    Pt will be 50% independent with HEP.      Long Term Goals: 6 weeks   Pt will be 100% independent with HEP  Pt will be able be able to perform her normal household activities without difficulty.  Pt will be able to perform all tranfers without difficulty.  Pt will be able to walk several blocks without difficulty.     PLAN   Plan of care Certification: 12/21/2022 to 02/01/2023.     Outpatient Physical Therapy 2 times weekly for 6 weeks to include the following interventions: Cervical/Lumbar Traction, Electrical Stimulation PRN, Manual Therapy, Moist Heat/ Ice, Neuromuscular Re-ed, Patient Education, Self Care, Therapeutic  Activities, Therapeutic Exercise, and FDN .       Andres Martines, PT

## 2023-01-19 ENCOUNTER — CLINICAL SUPPORT (OUTPATIENT)
Dept: REHABILITATION | Facility: HOSPITAL | Age: 68
End: 2023-01-19
Payer: MEDICARE

## 2023-01-19 DIAGNOSIS — M54.6 ACUTE RIGHT-SIDED THORACIC BACK PAIN: Primary | ICD-10-CM

## 2023-01-19 DIAGNOSIS — M54.50 ACUTE RIGHT-SIDED LOW BACK PAIN WITHOUT SCIATICA: ICD-10-CM

## 2023-01-19 PROCEDURE — 97110 THERAPEUTIC EXERCISES: CPT | Mod: PN

## 2023-01-19 NOTE — PROGRESS NOTES
[facility]   Physical Therapy Treatment Note     Name: Casie Frias [facility]  Clinic Number: [id number]    Therapy Diagnosis:   Encounter Diagnoses   Name Primary?    Acute right-sided thoracic back pain Yes    Acute right-sided low back pain without sciatica          Physician: Rossana Ang MD    Visit Date: 1/19/2023    Physician Orders: PT Eval and Treat   Medical Diagnosis from Referral: M54.9 (ICD-10-CM) - Upper back pain on right side   Evaluation Date: 12/21/2022  Authorization Period Expiration: 12/31/2022  Plan of Care Expiration: 02/01/2023  Progress Note Due: 10th visit  Visit # / Visits authorized: 3/10; 11/20  FOTO: 1/3    PTA Visit #: 0/5     Time In: 1:10  Time Out: 1:45  Total Billable Time: 38 minutes    SUBJECTIVE     Pt reports: no pain today.  Pt. Reports taking a long walk and no pain occurred in her back.  She was compliant with home exercise program.  Response to previous treatment: no decrease in pain  Functional change: none noted    Pain: 0/10  Location: mid to lower thoracic pain      OBJECTIVE     Objective Measures updated at progress report unless specified.     Treatment     Casie received the treatments listed below:      Self-Care/ Home Management x --min.:  Pt. Educated on importance of anti- inflammatory diet to reduce arthritic pain and educated on inflammatory foods and inflammatory reducing foods    therapeutic exercises to develop strength, flexibility, and core stabilization for 38 minutes including:  Recumbent bike x 6 min. For joint mobility  MedEx extension 46# x20  B MedEx rotation 20# x 20  Pelvic tilting 2x10- NT  Prone knee bends  Lumbar seated segmental flexion with Swiss Ball  B SL open books 2x10  Prone quad stretch with strap 20 seconds x 3 - NT  Prone hip extension 2x10- NT  B Dynamic HSS 10x         Patient Education and Home Exercises     Home Exercises Provided and Patient Education Provided     Education provided:   - HEP,  condition, activity    Written Home Exercises Provided: yes. Exercises were reviewed and Casie was able to demonstrate them prior to the end of the session.  Casie demonstrated good  understanding of the education provided. See EMR under Patient Instructions for exercises provided during therapy sessions    ASSESSMENT     Pt. Is agreeable to work on increasing functional strength to core, lumbar, and hip muscles to reduce return of pain.  Pt. Tolerated increased resistance of Med X machine without discomfort.  Limited with treatment secondary pt. A few minutes late for PT and had phone calls during session.    Casie Is progressing well towards her goals.   Pt prognosis is Excellent.     Pt will continue to benefit from skilled outpatient physical therapy to address the deficits listed in the problem list box on initial evaluation, provide pt/family education and to maximize pt's level of independence in the home and community environment.     Pt's spiritual, cultural and educational needs considered and pt agreeable to plan of care and goals.     Anticipated barriers to physical therapy: none    Goals:   Goals: Short Term Goals:    Pt will be 50% independent with HEP.      Long Term Goals: 6 weeks   Pt will be 100% independent with HEP  Pt will be able be able to perform her normal household activities without difficulty.  Pt will be able to perform all tranfers without difficulty.  Pt will be able to walk several blocks without difficulty.     PLAN   Plan of care Certification: 12/21/2022 to 02/01/2023.     Outpatient Physical Therapy 2 times weekly for 6 weeks to include the following interventions: Cervical/Lumbar Traction, Electrical Stimulation PRN, Manual Therapy, Moist Heat/ Ice, Neuromuscular Re-ed, Patient Education, Self Care, Therapeutic Activities, Therapeutic Exercise, and FDN .       Lulu Swan, PT

## 2023-01-23 ENCOUNTER — OFFICE VISIT (OUTPATIENT)
Dept: OPHTHALMOLOGY | Facility: CLINIC | Age: 68
End: 2023-01-23
Payer: MEDICARE

## 2023-01-23 DIAGNOSIS — Z83.511 FAMILY HISTORY OF GLAUCOMA IN MOTHER: ICD-10-CM

## 2023-01-23 DIAGNOSIS — H52.7 REFRACTIVE ERRORS: ICD-10-CM

## 2023-01-23 DIAGNOSIS — H40.013 AT LOW RISK FOR OPEN-ANGLE GLAUCOMA IN BOTH EYES: ICD-10-CM

## 2023-01-23 DIAGNOSIS — H25.13 CATARACT, NUCLEAR SCLEROTIC SENILE, BILATERAL: Primary | ICD-10-CM

## 2023-01-23 PROCEDURE — 92014 COMPRE OPH EXAM EST PT 1/>: CPT | Mod: S$GLB,,, | Performed by: OPTOMETRIST

## 2023-01-23 PROCEDURE — 92014 PR EYE EXAM, EST PATIENT,COMPREHESV: ICD-10-PCS | Mod: S$GLB,,, | Performed by: OPTOMETRIST

## 2023-01-23 PROCEDURE — 99999 PR PBB SHADOW E&M-EST. PATIENT-LVL III: CPT | Mod: PBBFAC,,, | Performed by: OPTOMETRIST

## 2023-01-23 PROCEDURE — 1159F PR MEDICATION LIST DOCUMENTED IN MEDICAL RECORD: ICD-10-PCS | Mod: CPTII,S$GLB,, | Performed by: OPTOMETRIST

## 2023-01-23 PROCEDURE — 99999 PR PBB SHADOW E&M-EST. PATIENT-LVL III: ICD-10-PCS | Mod: PBBFAC,,, | Performed by: OPTOMETRIST

## 2023-01-23 PROCEDURE — 1159F MED LIST DOCD IN RCRD: CPT | Mod: CPTII,S$GLB,, | Performed by: OPTOMETRIST

## 2023-01-23 NOTE — PROGRESS NOTES
HPI    2 weeks full eye exam  Blurred vision at near and distance visual acuity.  Last eye visit 12/14/2022 TRF.  Update glasses RX.  Hx of allergic conjunctivitis bilateral    Last edited by Dianne Swan MA on 1/23/2023  2:51 PM.            Assessment /Plan     For exam results, see Encounter Report.    Cataract, nuclear sclerotic senile, bilateral    At low risk for open-angle glaucoma in both eyes    Family history of glaucoma in mother    Refractive errors      Significant cataracts OU, discussed cataract surgery including Specialty IOL's  Refer to gaurang Jha, low tension poag suspect  Consult Ophthalmology for cataract evaluation at next available appointment.    Low IOP, large C/D, mother has glaucoma  Will schedule VF and gOCT for [facility]

## 2023-01-23 NOTE — Clinical Note
Significant cataracts OU, discussed cataract surgery including Specialty IOL's Refer to Dr Sawant, gaurang estrada, low tension poag suspect

## 2023-01-24 ENCOUNTER — CLINICAL SUPPORT (OUTPATIENT)
Dept: REHABILITATION | Facility: HOSPITAL | Age: 68
End: 2023-01-24
Payer: MEDICARE

## 2023-01-24 DIAGNOSIS — M54.6 ACUTE RIGHT-SIDED THORACIC BACK PAIN: Primary | ICD-10-CM

## 2023-01-24 DIAGNOSIS — M54.50 ACUTE RIGHT-SIDED LOW BACK PAIN WITHOUT SCIATICA: ICD-10-CM

## 2023-01-24 PROCEDURE — 97110 THERAPEUTIC EXERCISES: CPT | Mod: PN

## 2023-01-24 NOTE — PROGRESS NOTES
[facility]   Physical Therapy Treatment Note     Name: Casie Gaines  Clinic Number: [id number]    Therapy Diagnosis:   Encounter Diagnoses   Name Primary?    Acute right-sided thoracic back pain Yes    Acute right-sided low back pain without sciatica            Physician: Rossana Ang MD    Visit Date: 1/24/2023    Physician Orders: PT Eval and Treat   Medical Diagnosis from Referral: M54.9 (ICD-10-CM) - Upper back pain on right side   Evaluation Date: 12/21/2022  Authorization Period Expiration: 12/31/2022  Plan of Care Expiration: 02/01/2023  Progress Note Due: 10th visit  Visit # / Visits authorized: 3/10; 12/20  FOTO: 1/3    PTA Visit #: 0/5     Time In: 10:30  Time Out: 11:10  Total Billable Time: 10 minutes, 38 minutes total, 28 minutes with Tech    SUBJECTIVE     Pt reports: no pain today.  She was compliant with home exercise program.  Response to previous treatment: no decrease in pain  Functional change: none noted    Pain: 0/10  Location: mid to lower thoracic pain      OBJECTIVE     Objective Measures updated at progress report unless specified.     Treatment     Casie received the treatments listed below:      therapeutic exercises to develop strength, flexibility, and core stabilization for 38 minutes including:  Recumbent bike x 6 min. For joint mobility  MedEx extension 46#   B MedEx rotation 20#   Lumbar seated segmental flexion with Swiss Ball  Sit to stands from 24 inch box  B Lateral Step Ups  B SL dead lift  Modified Dead lift 20#      Patient Education and Home Exercises     Home Exercises Provided and Patient Education Provided     Education provided:   - HEP, condition, activity    Written Home Exercises Provided: yes. Exercises were reviewed and Casie was able to demonstrate them prior to the end of the session.  Casie demonstrated good  understanding of the education provided. See EMR under Patient Instructions for exercises provided during therapy  sessions    ASSESSMENT     Pt.cont. to not have any pain in back.  Pt. Reports being able to tolerate torso rotation with greater ROM and no discomfort.  Pt. Cont. Core and lumbar strengthening.  Began functional strengthening with standing activities to increase tolerance for lifting and bending activities.  Casie Is progressing well towards her goals.   Pt prognosis is Excellent.     Pt will continue to benefit from skilled outpatient physical therapy to address the deficits listed in the problem list box on initial evaluation, provide pt/family education and to maximize pt's level of independence in the home and community environment.     Pt's spiritual, cultural and educational needs considered and pt agreeable to plan of care and goals.     Anticipated barriers to physical therapy: none    Goals:   Goals: Short Term Goals:    Pt will be 50% independent with HEP.      Long Term Goals: 6 weeks   Pt will be 100% independent with HEP  Pt will be able be able to perform her normal household activities without difficulty.  Pt will be able to perform all tranfers without difficulty.  Pt will be able to walk several blocks without difficulty.     PLAN   Plan of care Certification: 12/21/2022 to 02/01/2023.     Outpatient Physical Therapy 2 times weekly for 6 weeks to include the following interventions: Cervical/Lumbar Traction, Electrical Stimulation PRN, Manual Therapy, Moist Heat/ Ice, Neuromuscular Re-ed, Patient Education, Self Care, Therapeutic Activities, Therapeutic Exercise, and FDN .       Lulu Swan, PT

## 2023-02-08 ENCOUNTER — OFFICE VISIT (OUTPATIENT)
Dept: OPHTHALMOLOGY | Facility: CLINIC | Age: 68
End: 2023-02-08
Payer: MEDICARE

## 2023-02-08 DIAGNOSIS — H40.013 AT LOW RISK FOR OPEN-ANGLE GLAUCOMA IN BOTH EYES: Primary | ICD-10-CM

## 2023-02-08 PROCEDURE — 1159F PR MEDICATION LIST DOCUMENTED IN MEDICAL RECORD: ICD-10-PCS | Mod: CPTII,S$GLB,, | Performed by: OPTOMETRIST

## 2023-02-08 PROCEDURE — 92083 HUMPHREY VISUAL FIELD - OU - BOTH EYES: ICD-10-PCS | Mod: S$GLB,,, | Performed by: OPTOMETRIST

## 2023-02-08 PROCEDURE — 92012 INTRM OPH EXAM EST PATIENT: CPT | Mod: S$GLB,,, | Performed by: OPTOMETRIST

## 2023-02-08 PROCEDURE — 92133 CPTRZD OPH DX IMG PST SGM ON: CPT | Mod: S$GLB,,, | Performed by: OPTOMETRIST

## 2023-02-08 PROCEDURE — 92012 PR EYE EXAM, EST PATIENT,INTERMED: ICD-10-PCS | Mod: S$GLB,,, | Performed by: OPTOMETRIST

## 2023-02-08 PROCEDURE — 99999 PR PBB SHADOW E&M-EST. PATIENT-LVL III: CPT | Mod: PBBFAC,,, | Performed by: OPTOMETRIST

## 2023-02-08 PROCEDURE — 92083 EXTENDED VISUAL FIELD XM: CPT | Mod: S$GLB,,, | Performed by: OPTOMETRIST

## 2023-02-08 PROCEDURE — 99999 PR PBB SHADOW E&M-EST. PATIENT-LVL III: ICD-10-PCS | Mod: PBBFAC,,, | Performed by: OPTOMETRIST

## 2023-02-08 PROCEDURE — 1159F MED LIST DOCD IN RCRD: CPT | Mod: CPTII,S$GLB,, | Performed by: OPTOMETRIST

## 2023-02-08 PROCEDURE — 92133 POSTERIOR SEGMENT OCT OPTIC NERVE(OCULAR COHERENCE TOMOGRAPHY) - OU - BOTH EYES: ICD-10-PCS | Mod: S$GLB,,, | Performed by: OPTOMETRIST

## 2023-02-08 NOTE — PROGRESS NOTES
HPI    1-2 week IOP check.  Review 24-2 and GOCT.  Hard to focus at near and distance  Medications if any: None  High IOP: 12/12 01/23/2023  Family Hx:     Last edited by Dianne Swan MA on 2/8/2023  4:03 PM.            Assessment /Plan     For exam results, see Encounter Report.    At low risk for open-angle glaucoma in both eyes  -     Siddiqi Visual Field - OU - Extended - Both Eyes  -     Posterior Segment OCT Optic Nerve- Both eyes      VF No glaucomatous changes OD  Non specific defects OS, will repeat in 6 months    OCT Nasal and superior thinning OD, will repeat in 6 months  No evidence of glaucomatous changes OS  GCL OD 29/29 OS 29/25    Cat eval set for March    RT 6 months VF gOCT IOP CK

## 2023-03-01 ENCOUNTER — DOCUMENTATION ONLY (OUTPATIENT)
Dept: OPHTHALMOLOGY | Facility: CLINIC | Age: 68
End: 2023-03-01

## 2023-03-01 ENCOUNTER — OFFICE VISIT (OUTPATIENT)
Dept: OPHTHALMOLOGY | Facility: CLINIC | Age: 68
End: 2023-03-01
Payer: MEDICARE

## 2023-03-01 DIAGNOSIS — H25.042 POSTERIOR SUBCAPSULAR POLAR AGE-RELATED CATARACT OF LEFT EYE: ICD-10-CM

## 2023-03-01 DIAGNOSIS — H40.013 AT LOW RISK FOR OPEN-ANGLE GLAUCOMA IN BOTH EYES: ICD-10-CM

## 2023-03-01 DIAGNOSIS — H25.011 CORTICAL SENILE CATARACT OF RIGHT EYE: ICD-10-CM

## 2023-03-01 DIAGNOSIS — H25.041 POSTERIOR SUBCAPSULAR POLAR SENILE CATARACT, RIGHT: ICD-10-CM

## 2023-03-01 DIAGNOSIS — H25.012 CORTICAL SENILE CATARACT OF LEFT EYE: Primary | ICD-10-CM

## 2023-03-01 PROCEDURE — 1159F MED LIST DOCD IN RCRD: CPT | Mod: CPTII,S$GLB,, | Performed by: OPHTHALMOLOGY

## 2023-03-01 PROCEDURE — 92133 CPTRZD OPH DX IMG PST SGM ON: CPT | Mod: S$GLB,,, | Performed by: OPHTHALMOLOGY

## 2023-03-01 PROCEDURE — 99203 PR OFFICE/OUTPT VISIT, NEW, LEVL III, 30-44 MIN: ICD-10-PCS | Mod: S$GLB,,, | Performed by: OPHTHALMOLOGY

## 2023-03-01 PROCEDURE — 92136 OPHTHALMIC BIOMETRY: CPT | Mod: LT,S$GLB,, | Performed by: OPHTHALMOLOGY

## 2023-03-01 PROCEDURE — 92136 IOL MASTER - OS - LEFT EYE: ICD-10-PCS | Mod: LT,S$GLB,, | Performed by: OPHTHALMOLOGY

## 2023-03-01 PROCEDURE — 99999 PR PBB SHADOW E&M-EST. PATIENT-LVL III: CPT | Mod: PBBFAC,,, | Performed by: OPHTHALMOLOGY

## 2023-03-01 PROCEDURE — 99203 OFFICE O/P NEW LOW 30 MIN: CPT | Mod: S$GLB,,, | Performed by: OPHTHALMOLOGY

## 2023-03-01 PROCEDURE — 1159F PR MEDICATION LIST DOCUMENTED IN MEDICAL RECORD: ICD-10-PCS | Mod: CPTII,S$GLB,, | Performed by: OPHTHALMOLOGY

## 2023-03-01 PROCEDURE — 99999 PR PBB SHADOW E&M-EST. PATIENT-LVL III: ICD-10-PCS | Mod: PBBFAC,,, | Performed by: OPHTHALMOLOGY

## 2023-03-01 PROCEDURE — 1160F RVW MEDS BY RX/DR IN RCRD: CPT | Mod: CPTII,S$GLB,, | Performed by: OPHTHALMOLOGY

## 2023-03-01 PROCEDURE — 92133 POSTERIOR SEGMENT OCT OPTIC NERVE(OCULAR COHERENCE TOMOGRAPHY) - OU - BOTH EYES: ICD-10-PCS | Mod: S$GLB,,, | Performed by: OPHTHALMOLOGY

## 2023-03-01 PROCEDURE — 1160F PR REVIEW ALL MEDS BY PRESCRIBER/CLIN PHARMACIST DOCUMENTED: ICD-10-PCS | Mod: CPTII,S$GLB,, | Performed by: OPHTHALMOLOGY

## 2023-03-01 RX ORDER — PREDNISOLONE ACETATE 10 MG/ML
1 SUSPENSION/ DROPS OPHTHALMIC 4 TIMES DAILY
Qty: 5 ML | Refills: 0 | Status: SHIPPED | OUTPATIENT
Start: 2023-03-01 | End: 2023-03-17 | Stop reason: SDUPTHER

## 2023-03-01 RX ORDER — PREDNISOLONE ACETATE 10 MG/ML
1 SUSPENSION/ DROPS OPHTHALMIC 4 TIMES DAILY
Qty: 5 ML | Refills: 0 | Status: SHIPPED | OUTPATIENT
Start: 2023-03-01 | End: 2023-03-01

## 2023-03-01 NOTE — PROGRESS NOTES
Short Stay Record    CC: Blurry Vision     Impression: Visually significant cataract with reasonable expectation for visual improvement Left Eye      Planned Procedure:   Slit Lamp and Fundus Exam    External Exam     Right Left   External Normal Normal     Slit Lamp Exam     Right Left   Lids/Lashes Normal Normal   Conjunctiva/Sclera White and quiet White and quiet   Cornea Clear Clear   Anterior Chamber Deep and quiet Deep and quiet   Iris Round and reactive Round and reactive   Lens 2+ Posterior subcapsular cataract, 2+ Cortical cataract 2+ Posterior subcapsular cataract, 2+ Cortical cataract   Vitreous Normal Normal     Fundus Exam     Right Left   Disc Normal Normal   C/D Ratio 0.5 0.5   Macula Normal Normal   Vessels Normal Normal   Periphery Normal Normal   Edited by: Srinivasan Sawant MD        Refraction      Manifest Refraction     Sphere Cylinder Dist VA Add   Right +0.75 Sphere 20/40 +2.50   Left [place] Sphere 20/40 +2.50       Topography reviewed yes   History of Refractive surgery none      Phaco left eye, +20.0 SY60WF   Pred with Kenalog injection,   Shugarcaine   Possible vision Blue  Topical  monofocal IOL.  Will aim for distance  Anticoagulant status none            Lens Selection OS verified _____             Previous IOL OD _n/a____    Patient cleared for ophthalmic surgery.     Discharge Summary:    Admitting Diagnosis: cortical cat /  OS    Discharge Diagnosis: Pseudophakia OS    Procedure: Phacoemulsification of cataract with intraocular lens implant OS    Complications: None  Discharge Condition: Stable    Discharge instructions: Follow post-operative discharge instruction sheet given by provider.    Follow-up: Return to clinic next day or as scheduled.       HPI:  Casie Gaines is a 67 y.o. female who presents for evaluation prior to ophthalmic surgery, left eye. Patient reports of blurred vision at distance and near with and without correction. Patient reports of glare at night  reducing function and safety, and complains of needed additional light to read.     Past Medical History:   Diagnosis Date    Anemia     Anxiety     Arthritis     knees    Cancer     ovarian    CHF (congestive heart failure)     Hx antineoplastic chemotherapy     last 2019    Hyperlipemia     Hypertension     Neck pain     Ovarian cancer 2019    CHEMO    Peritoneal carcinomatosis 2019     Past Surgical History:   Procedure Laterality Date    breast reduction  10/02/2018     SECTION      X 1    COLONOSCOPY N/A 2021    Procedure: COLONOSCOPY;  Surgeon: Paulette Rojas MD;  Location: [facility];  Service: Gastroenterology;  Laterality: N/A;    CYSTOSCOPY W/ URETERAL STENT PLACEMENT Right 2019    Procedure: CYSTOSCOPY, WITH URETERAL STENT INSERTION;  Surgeon: Timmy Santiago IV, MD;  Location: [facility] OR;  Service: Urology;  Laterality: Right;    CYSTOSCOPY W/ URETERAL STENT REMOVAL  10/04/2019    DILATION AND CURETTAGE OF UTERUS      HYSTERECTOMY      RALH for fibroids (still has ovaries)    INSERTION OF VENOUS ACCESS PORT Left 2019    Procedure: INSERTION, VENOUS ACCESS PORT;  Surgeon: Ulisses Monzon MD;  Location: [facility] OR;  Service: General;  Laterality: Left;  Left internal jugular     LYSIS OF ADHESIONS OF URETER N/A 8/15/2019    Procedure: URETEROLYSIS;  Surgeon: Ismael Juarez MD;  Location: [facility] OR;  Service: OB/GYN;  Laterality: N/A;    OMENTECTOMY N/A 8/15/2019    Procedure: OMENTECTOMY;  Surgeon: Ismael Juarez MD;  Location: [facility] OR;  Service: OB/GYN;  Laterality: N/A;    RETROGRADE PYELOGRAPHY Right 2019    Procedure: PYELOGRAM, RETROGRADE;  Surgeon: Timmy Santiago IV, MD;  Location: [facility] OR;  Service: Urology;  Laterality: Right;    ROBOT-ASSISTED LAPAROSCOPIC SALPINGO-OOPHORECTOMY USING DA TIA XI Bilateral 8/15/2019    Procedure: XI ROBOTIC SALPINGO-OOPHORECTOMY;  Surgeon: Ismael Juarez MD;  Location: [facility];  Service: OB/GYN;  Laterality: Bilateral;    TOTAL  REDUCTION MAMMOPLASTY  2018    TUBAL LIGATION       Social History     Tobacco Use    Smoking status: Former     Packs/day: 1.00     Years: 25.00     Pack years: 25.00     Types: Cigarettes     Quit date: 10/1/2018     Years since quittin.4    Smokeless tobacco: Never    Tobacco comments:     States started quit 2 months ago after 30 years   Substance Use Topics    Alcohol use: Yes     Comment: occasionally  No alcohol 72h prior to sx     Family History   Problem Relation Age of Onset    Glaucoma Mother     Colon cancer Brother     Breast cancer Maternal Aunt     Breast cancer Paternal Aunt     Ovarian cancer Paternal Aunt     Anesthesia problems Other     Thrombophilia Neg Hx      Review of patient's allergies indicates:  No Known Allergies      Current Outpatient Medications:     albuterol 90 mcg/actuation inhaler, Inhale 2 puffs into the lungs every 4 (four) hours as needed for Wheezing. Rescue, Disp: 18 g, Rfl: 0    ALPRAZolam (XANAX) 0.25 MG tablet, Take 1 tablet (0.25 mg total) by mouth 3 (three) times daily as needed for Anxiety., Disp: 60 tablet, Rfl: 2    amLODIPine (NORVASC) 5 MG tablet, Take 2 tablets (10 mg total) by mouth once daily., Disp: 60 tablet, Rfl: 11    atenoloL (TENORMIN) 25 MG tablet, Take 1 tablet (25 mg total) by mouth once daily., Disp: 90 tablet, Rfl: 3    azelastine (ASTELIN) 137 mcg (0.1 %) nasal spray, 1 spray (137 mcg total) by Nasal route 2 (two) times daily., Disp: 30 mL, Rfl: 0    biotin 1 mg tablet, Take 1,000 mcg by mouth once daily. , Disp: , Rfl:     cetirizine (ZYRTEC) 10 MG tablet, Take 1 tablet (10 mg total) by mouth once daily., Disp: 30 tablet, Rfl: 5    diclofenac sodium (VOLTAREN) 1 % Gel, Apply once a day to back as needed, Disp: 100 g, Rfl: 1    EPINEPHrine (EPIPEN) 0.3 mg/0.3 mL AtIn, Inject 0.3 mLs (0.3 mg total) into the muscle once. for 1 dose, Disp: 2 each, Rfl: 0    fluticasone propionate (FLONASE) 50 mcg/actuation nasal spray, 1 spray (50 mcg total) by Each  Nostril route every 12 (twelve) hours as needed for Rhinitis., Disp: 16 g, Rfl: 0    gabapentin (NEURONTIN) 100 MG capsule, Take 1 capsule (100 mg total) by mouth 3 (three) times daily., Disp: 90 capsule, Rfl: 11    ginkgo biloba 40 mg Tab, Take 40 mg by mouth., Disp: , Rfl:     hydrOXYzine HCL (ATARAX) 25 MG tablet, Take 1 tablet (25 mg total) by mouth 3 (three) times daily as needed for Itching., Disp: 90 tablet, Rfl: 1    multivitamin with minerals tablet, Take 1 tablet by mouth once daily. , Disp: , Rfl:     olmesartan-hydrochlorothiazide (BENICAR HCT) 40-25 mg per tablet, Take 1 tablet by mouth once daily., Disp: 30 tablet, Rfl: 0    prednisoLONE acetate (PRED FORTE) 1 % DrpS, Place 1 drop into the left eye 4 (four) times daily., Disp: 5 mL, Rfl: 0    rosuvastatin (CRESTOR) 10 MG tablet, Take 1 tablet (10 mg total) by mouth once daily., Disp: 90 tablet, Rfl: 1    sertraline (ZOLOFT) 25 MG tablet, Take 1 tablet (25 mg total) by mouth once daily., Disp: 90 tablet, Rfl: 3    sumatriptan (IMITREX) 50 MG tablet, Take 1 tablet (50 mg total) by mouth every 4 to 6 hours as needed for Migraine., Disp: 30 tablet, Rfl: 1    zinc gluconate 50 mg tablet, Take 50 mg by mouth once daily., Disp: , Rfl:     Review of Systems:  A comprehensive review of systems was negative.    Physical Exam:  General Appearance:    A&Ox3, no distress, appears stated age   Head:    Normocephalic, without obvious abnormality, atraumatic   Eyes:    PERRL, EOM's intact   Back:     Symmetric, no curvature   Lungs:     Respirations unlabored   Chest Wall:    No tenderness or deformity    Heart:  Abdomen:  Extremities:  Skin:    S1 and S2 present    Soft, non-tender    Extremities normal, atraumatic    Skin color, texture, turgor normal

## 2023-03-01 NOTE — PROGRESS NOTES
HPI     Cataract            Comments: Cat sean per TRF          Comments    Hard to focus at near/ distance - vision hazy - denies pain/ discomfort OU   - +glare from oncoming headlights      TRF REFERRAL      1. CATS OU  2. Low tension fareed (per TRF)            Last edited by Beti Francis MA on 3/1/2023  1:52 PM.            Assessment /Plan     For exam results, see Encounter Report.      ICD-10-CM ICD-9-CM    1. Cortical senile cataract of left eye  H25.012 366.15 Visually Significant Cataract OS > OD  Patient reports decreased vision consistent with the clinical amount of lenticular opacity, which reaches the level of visual significance and affects activities of daily living including reading and glare. Risks, benefits, and alternatives to cataract surgery were discussed.   The pt expressed a desire to proceed with surgery with the potential for some reasonable degree of visual improvement.    Discussed IOL options and refractive outcomes for this patient.    Topography reviewed yes   History of Refractive surgery none     Phaco left eye, +20.0 SY60WF   Pred with Kenalog injection,   Shugarcaine   Possible vision Blue  Topical  monofocal IOL.  Will aim for distance  Anticoagulant status none       Explained that patient may need glasses after surgery.  Discussed that vision may be limited by astigmatism      Referral to [facility] for Ophthalmic [facility]      Schedule post op visit #2 with MGM         2. Posterior subcapsular polar age-related cataract of left eye  H25.042 366.14 SEE ABOVE       3. Cortical senile cataract of right eye  H25.011 366.15 FOLLOW OD AT THIS TIME, BOOK PHACO OS FIRST       4. Posterior subcapsular polar senile cataract, right  H25.041 366.14       5. At low risk for open-angle glaucoma in both eyes  H40.013 365.01 Posterior Segment OCT Optic Nerve- Both eyes done today, follow at this time

## 2023-03-09 ENCOUNTER — OUTSIDE PLACE OF SERVICE (OUTPATIENT)
Dept: OPHTHALMOLOGY | Facility: CLINIC | Age: 68
End: 2023-03-09
Payer: MEDICARE

## 2023-03-09 ENCOUNTER — TELEPHONE (OUTPATIENT)
Dept: OTOLARYNGOLOGY | Facility: CLINIC | Age: 68
End: 2023-03-09
Payer: MEDICARE

## 2023-03-09 PROCEDURE — 66982 XCAPSL CTRC RMVL CPLX WO ECP: CPT | Mod: LT,,, | Performed by: OPHTHALMOLOGY

## 2023-03-09 PROCEDURE — 66982 PR REMOVAL, CATARACT, W/INSRT INTRAOC LENS, W/O ENDO CYCLO, CMPLX: ICD-10-PCS | Mod: LT,,, | Performed by: OPHTHALMOLOGY

## 2023-03-09 NOTE — TELEPHONE ENCOUNTER
Return call placed to pt.  Pt states she was contacted by ENT - does not know what they were trying to schedule. Advised pt I did not see any note regarding scheduling and I would have someone reach out tomorrow once clarification was received.

## 2023-03-09 NOTE — TELEPHONE ENCOUNTER
----- Message from Eleanor Cantu sent at 3/9/2023  4:09 PM CST -----  Contact: Casie  .Type:  Patient Returning Call    Who Called:Casie   Who Left Message for Patient:unknown  Does the patient know what this is regarding?:appointment   Would the patient rather a call back or a response via [facility]? Call   Best Call Back Number:.[phone]   Additional Information:   Thanks  LR

## 2023-03-10 ENCOUNTER — OFFICE VISIT (OUTPATIENT)
Dept: OPHTHALMOLOGY | Facility: CLINIC | Age: 68
End: 2023-03-10
Payer: MEDICARE

## 2023-03-10 DIAGNOSIS — Z98.890 POST-OPERATIVE STATE: Primary | ICD-10-CM

## 2023-03-10 DIAGNOSIS — Z98.42 CATARACT EXTRACTION STATUS, LEFT: ICD-10-CM

## 2023-03-10 PROCEDURE — 99999 PR PBB SHADOW E&M-EST. PATIENT-LVL III: ICD-10-PCS | Mod: PBBFAC,,, | Performed by: OPHTHALMOLOGY

## 2023-03-10 PROCEDURE — 1160F RVW MEDS BY RX/DR IN RCRD: CPT | Mod: CPTII,S$GLB,, | Performed by: OPHTHALMOLOGY

## 2023-03-10 PROCEDURE — 1160F PR REVIEW ALL MEDS BY PRESCRIBER/CLIN PHARMACIST DOCUMENTED: ICD-10-PCS | Mod: CPTII,S$GLB,, | Performed by: OPHTHALMOLOGY

## 2023-03-10 PROCEDURE — 99024 POSTOP FOLLOW-UP VISIT: CPT | Mod: S$GLB,,, | Performed by: OPHTHALMOLOGY

## 2023-03-10 PROCEDURE — 1159F PR MEDICATION LIST DOCUMENTED IN MEDICAL RECORD: ICD-10-PCS | Mod: CPTII,S$GLB,, | Performed by: OPHTHALMOLOGY

## 2023-03-10 PROCEDURE — 99999 PR PBB SHADOW E&M-EST. PATIENT-LVL III: CPT | Mod: PBBFAC,,, | Performed by: OPHTHALMOLOGY

## 2023-03-10 PROCEDURE — 99024 PR POST-OP FOLLOW-UP VISIT: ICD-10-PCS | Mod: S$GLB,,, | Performed by: OPHTHALMOLOGY

## 2023-03-10 PROCEDURE — 1159F MED LIST DOCD IN RCRD: CPT | Mod: CPTII,S$GLB,, | Performed by: OPHTHALMOLOGY

## 2023-03-10 NOTE — PROGRESS NOTES
HPI     Post-op Evaluation            Comments: Pt reports for 1 day PCIOL OS. Denies any pain or irritation.   Va stable. 100% compliant with gtts.           Comments    1. PCIOL OS 3/9/23  2. Low tension fareed (per TRF)  +f/h COAG - Mom            Last edited by Srinivasan Salmon on 3/10/2023  8:23 AM.            Assessment /Plan     For exam results, see Encounter Report.      ICD-10-CM ICD-9-CM    1. Post-operative state  Z98.890 V45.89       2. Cataract extraction status, left  Z98.42 V45.61           PO Day 1 S/P Phaco/IOL  left eye  Doing well.    Use Durezol and keto QID    Reinstructed in importance of absolute compliance with Post-OP instructions including medications, shield at bedtime, and limitation of activities. Follow up appointments in approximately one and six weeks or call immediately for increased pain, redness or vision loss.

## 2023-03-17 ENCOUNTER — OFFICE VISIT (OUTPATIENT)
Dept: OPHTHALMOLOGY | Facility: CLINIC | Age: 68
End: 2023-03-17
Payer: MEDICARE

## 2023-03-17 DIAGNOSIS — H25.011 CORTICAL SENILE CATARACT OF RIGHT EYE: ICD-10-CM

## 2023-03-17 DIAGNOSIS — Z98.890 POST-OPERATIVE STATE: Primary | ICD-10-CM

## 2023-03-17 DIAGNOSIS — Z98.42 CATARACT EXTRACTION STATUS, LEFT: ICD-10-CM

## 2023-03-17 DIAGNOSIS — H25.041 POSTERIOR SUBCAPSULAR POLAR SENILE CATARACT, RIGHT: ICD-10-CM

## 2023-03-17 PROCEDURE — 92136 IOL MASTER - OD - RIGHT EYE: ICD-10-PCS | Mod: 26,RT,S$GLB, | Performed by: OPHTHALMOLOGY

## 2023-03-17 PROCEDURE — 99024 PR POST-OP FOLLOW-UP VISIT: ICD-10-PCS | Mod: S$GLB,,, | Performed by: OPHTHALMOLOGY

## 2023-03-17 PROCEDURE — 99999 PR PBB SHADOW E&M-EST. PATIENT-LVL III: CPT | Mod: PBBFAC,,, | Performed by: OPHTHALMOLOGY

## 2023-03-17 PROCEDURE — 1159F MED LIST DOCD IN RCRD: CPT | Mod: CPTII,S$GLB,, | Performed by: OPHTHALMOLOGY

## 2023-03-17 PROCEDURE — 92136 OPHTHALMIC BIOMETRY: CPT | Mod: 26,RT,S$GLB, | Performed by: OPHTHALMOLOGY

## 2023-03-17 PROCEDURE — 99024 POSTOP FOLLOW-UP VISIT: CPT | Mod: S$GLB,,, | Performed by: OPHTHALMOLOGY

## 2023-03-17 PROCEDURE — 99999 PR PBB SHADOW E&M-EST. PATIENT-LVL III: ICD-10-PCS | Mod: PBBFAC,,, | Performed by: OPHTHALMOLOGY

## 2023-03-17 PROCEDURE — 1159F PR MEDICATION LIST DOCUMENTED IN MEDICAL RECORD: ICD-10-PCS | Mod: CPTII,S$GLB,, | Performed by: OPHTHALMOLOGY

## 2023-03-17 RX ORDER — PREDNISOLONE ACETATE 10 MG/ML
1 SUSPENSION/ DROPS OPHTHALMIC 4 TIMES DAILY
Qty: 5 ML | Refills: 1 | Status: SHIPPED | OUTPATIENT
Start: 2023-03-17 | End: 2023-06-12

## 2023-03-17 NOTE — PROGRESS NOTES
HPI     Post-op Evaluation            Comments: S/p IOL OS  3/9/23          Comments    Aleksn states that va is great OS - having some trouble getting drops in   as directed - missed drops one day and then hasn't gotten them in all 4 xs   on other days - VA OD is blurry and causing depth perception issues      TRF REFERRAL      1. PCIOL OS 3/9/23/ SY60WF 20.0/ CDE: 8.63  2. Low tension fareed (per TRF)  +f/h COAG - Mom      Durezol and keto QID            Last edited by Beti Francis MA on 3/17/2023 11:11 AM.            Assessment /Plan     For exam results, see Encounter Report.      ICD-10-CM ICD-9-CM    1. Post-operative state  Z98.890 V45.89 Doing well     Taper pred tid x 1 week, then taper down as instruction sheet that was given to pt today       2. Cataract extraction status, left  Z98.42 V45.61       3. Cortical senile cataract of right eye  H25.011 366.15 IOL Master - OD - Right Eye         With PSC    Ambulatory Referral to External Surgery      prednisoLONE acetate (PRED FORTE) 1 % DrpS  Patient reports decreased vision in the fellow eye consistent with the clinical amount of lenticular opacity, which reaches the level of visual significance and affects activities of daily living including reading and glare. Risks, benefits, and alternatives to cataract surgery were discussed and pt desired to schedule cataract surgery. Pt was consented and the biometry and lens options were reviewed.    Topography reviewed yes   History of Refractive surgery none     Phaco right with Kenalog inject   And Pred, Shugarcaine  +21.0 SY60WF  Topical , poss vision blue   Requests a monofocal   IOL.  Will aim for distance  Patient given prescriptions for pred   Anticoagulant status none     Explained that patient may need glasses after surgery.  Discussed that vision may be limited by glaucoma suspect- need for OTC readers after surgery       Schedule post op visit #2 with TRF

## 2023-03-31 ENCOUNTER — DOCUMENTATION ONLY (OUTPATIENT)
Dept: OPHTHALMOLOGY | Facility: CLINIC | Age: 68
End: 2023-03-31
Payer: MEDICARE

## 2023-03-31 NOTE — PROGRESS NOTES
Short Stay Record    CC: Blurry Vision     Impression: Visually significant cataract with reasonable expectation for visual improvement Right Eye      Slit Lamp and Fundus Exam  Last iop 10/11   External Exam     Right Left   External Normal Normal     Slit Lamp Exam     Right Left   Lids/Lashes Normal Normal   Conjunctiva/Sclera White and quiet White and quiet   Cornea Clear suture Temporal   Anterior Chamber Deep and quiet Trace Cell   Iris Round and reactive Round and reactive   Lens 2+ Posterior subcapsular cataract, 2+ Cortical cataract Posterior chamber intraocular lens   Vitreous Normal Normal     Fundus Exam     Right Left   Disc Normal Normal   C/D Ratio 0.5 0.5   Macula Normal Normal   Vessels Normal Normal   Periphery Normal Normal     Manifest Refraction     Sphere Cylinder Dist [facility]   Right +0.75 Sphere 20/40   Left [place] Sphere 20/20       Planned Procedure:     Topography reviewed yes   History of Refractive surgery none      Phaco right with Kenalog inject And Pred  Shugarcaine  +21.0 SY60WF  Topical , poss vision blue   Requests a monofocal   IOL.  Will aim for distance  Patient given prescriptions for pred   Anticoagulant status none           Lens Selection OD verified _____           Previous IOL OS _+20.0 [id number]____    Patient cleared for ophthalmic surgery.     Discharge Summary:    Admitting Diagnosis: cortical cataract / OD    Discharge Diagnosis: Pseudophakia OD    Procedure: Phacoemulsification of cataract with intraocular lens implant OD    Complications: None  Discharge Condition: Stable    Discharge instructions: Follow post-operative discharge instruction sheet given by provider.    Follow-up: Return to clinic next day or as scheduled.       HPI:  Casie Gaines is a 67 y.o. female who presents for evaluation prior to ophthalmic surgery, left eye. Patient reports of blurred vision at distance and near with and without correction. Patient reports of glare at night reducing function  and safety, and complains of needed additional light to read.     Past Medical History:   Diagnosis Date    Anemia     Anxiety     Arthritis     knees    Cancer     ovarian    CHF (congestive heart failure)     Hx antineoplastic chemotherapy     last 2019    Hyperlipemia     Hypertension     Neck pain     Ovarian cancer 2019    CHEMO    Peritoneal carcinomatosis 2019     Past Surgical History:   Procedure Laterality Date    breast reduction  10/02/2018     SECTION      X 1    COLONOSCOPY N/A 2021    Procedure: COLONOSCOPY;  Surgeon: Paulette Rojas MD;  Location: [facility] ENDO;  Service: Gastroenterology;  Laterality: N/A;    CYSTOSCOPY W/ URETERAL STENT PLACEMENT Right 2019    Procedure: CYSTOSCOPY, WITH URETERAL STENT INSERTION;  Surgeon: Timmy Santiago IV, MD;  Location: [facility] OR;  Service: Urology;  Laterality: Right;    CYSTOSCOPY W/ URETERAL STENT REMOVAL  10/04/2019    DILATION AND CURETTAGE OF UTERUS      HYSTERECTOMY      RALH for fibroids (still has ovaries)    INSERTION OF VENOUS ACCESS PORT Left 2019    Procedure: INSERTION, VENOUS ACCESS PORT;  Surgeon: Ulisses Monzon MD;  Location: [facility] OR;  Service: General;  Laterality: Left;  Left internal jugular     LYSIS OF ADHESIONS OF URETER N/A 8/15/2019    Procedure: URETEROLYSIS;  Surgeon: Ismael Juarez MD;  Location: [facility] OR;  Service: OB/GYN;  Laterality: N/A;    OMENTECTOMY N/A 8/15/2019    Procedure: OMENTECTOMY;  Surgeon: Ismael Juarez MD;  Location: [facility];  Service: OB/GYN;  Laterality: N/A;    RETROGRADE PYELOGRAPHY Right 2019    Procedure: PYELOGRAM, RETROGRADE;  Surgeon: Timmy Santiago IV, MD;  Location: [facility];  Service: Urology;  Laterality: Right;    ROBOT-ASSISTED LAPAROSCOPIC SALPINGO-OOPHORECTOMY USING DA TIA XI Bilateral 8/15/2019    Procedure: XI ROBOTIC SALPINGO-OOPHORECTOMY;  Surgeon: Ismael Juarez MD;  Location: [facility];  Service: OB/GYN;  Laterality: Bilateral;    TOTAL REDUCTION MAMMOPLASTY   2018    TUBAL LIGATION           Review of patient's allergies indicates:  No Known Allergies      Current Outpatient Medications:     albuterol 90 mcg/actuation inhaler, Inhale 2 puffs into the lungs every 4 (four) hours as needed for Wheezing. Rescue, Disp: 18 g, Rfl: 0    ALPRAZolam (XANAX) 0.25 MG tablet, Take 1 tablet (0.25 mg total) by mouth 3 (three) times daily as needed for Anxiety., Disp: 60 tablet, Rfl: 2    amLODIPine (NORVASC) 5 MG tablet, Take 2 tablets (10 mg total) by mouth once daily., Disp: 60 tablet, Rfl: 11    atenoloL (TENORMIN) 25 MG tablet, Take 1 tablet (25 mg total) by mouth once daily., Disp: 90 tablet, Rfl: 3    azelastine (ASTELIN) 137 mcg (0.1 %) nasal spray, 1 spray (137 mcg total) by Nasal route 2 (two) times daily., Disp: 30 mL, Rfl: 0    biotin 1 mg tablet, Take 1,000 mcg by mouth once daily. , Disp: , Rfl:     cetirizine (ZYRTEC) 10 MG tablet, Take 1 tablet (10 mg total) by mouth once daily., Disp: 30 tablet, Rfl: 5    diclofenac sodium (VOLTAREN) 1 % Gel, Apply once a day to back as needed, Disp: 100 g, Rfl: 1    EPINEPHrine (EPIPEN) 0.3 mg/0.3 mL AtIn, Inject 0.3 mLs (0.3 mg total) into the muscle once. for 1 dose, Disp: 2 each, Rfl: 0    fluticasone propionate (FLONASE) 50 mcg/actuation nasal spray, 1 spray (50 mcg total) by Each Nostril route every 12 (twelve) hours as needed for Rhinitis., Disp: 16 g, Rfl: 0    gabapentin (NEURONTIN) 100 MG capsule, Take 1 capsule (100 mg total) by mouth 3 (three) times daily., Disp: 90 capsule, Rfl: 11    ginkgo biloba 40 mg Tab, Take 40 mg by mouth., Disp: , Rfl:     hydrOXYzine HCL (ATARAX) 25 MG tablet, Take 1 tablet (25 mg total) by mouth 3 (three) times daily as needed for Itching., Disp: 90 tablet, Rfl: 1    multivitamin with minerals tablet, Take 1 tablet by mouth once daily. , Disp: , Rfl:     olmesartan-hydrochlorothiazide (BENICAR HCT) 40-25 mg per tablet, Take 1 tablet by mouth once daily., Disp: 30 tablet, Rfl: 0    prednisoLONE  acetate (PRED FORTE) 1 % DrpS, Place 1 drop into both eyes 4 (four) times daily., Disp: 5 mL, Rfl: 1    rosuvastatin (CRESTOR) 10 MG tablet, Take 1 tablet (10 mg total) by mouth once daily., Disp: 90 tablet, Rfl: 1    sertraline (ZOLOFT) 25 MG tablet, Take 1 tablet (25 mg total) by mouth once daily., Disp: 90 tablet, Rfl: 3    sumatriptan (IMITREX) 50 MG tablet, Take 1 tablet (50 mg total) by mouth every 4 to 6 hours as needed for Migraine., Disp: 30 tablet, Rfl: 1    zinc gluconate 50 mg tablet, Take 50 mg by mouth once daily., Disp: , Rfl:     Review of Systems:  A comprehensive review of systems was negative.    Physical Exam:  General Appearance:    A&Ox3, no distress, appears stated age   Head:    Normocephalic, without obvious abnormality, atraumatic   Eyes:    PERRL, EOM's intact   Back:     Symmetric, no curvature   Lungs:     Respirations unlabored   Chest Wall:    No tenderness or deformity    Heart:  Abdomen:  Extremities:  Skin:    S1 and S2 present    Soft, non-tender    Extremities normal, atraumatic    Skin color, texture, turgor normal

## 2023-04-13 ENCOUNTER — OUTSIDE PLACE OF SERVICE (OUTPATIENT)
Dept: OPHTHALMOLOGY | Facility: CLINIC | Age: 68
End: 2023-04-13
Payer: MEDICARE

## 2023-04-13 PROCEDURE — 66984 PR REMOVAL, CATARACT, W/INSRT INTRAOC LENS, W/O ENDO CYCLO: ICD-10-PCS | Mod: 79,RT,, | Performed by: OPHTHALMOLOGY

## 2023-04-13 PROCEDURE — 66984 XCAPSL CTRC RMVL W/O ECP: CPT | Mod: 79,RT,, | Performed by: OPHTHALMOLOGY

## 2023-04-14 ENCOUNTER — OFFICE VISIT (OUTPATIENT)
Dept: OPHTHALMOLOGY | Facility: CLINIC | Age: 68
End: 2023-04-14
Payer: MEDICARE

## 2023-04-14 DIAGNOSIS — Z98.890 POST-OPERATIVE STATE: Primary | ICD-10-CM

## 2023-04-14 DIAGNOSIS — Z98.41 CATARACT EXTRACTION STATUS, RIGHT: ICD-10-CM

## 2023-04-14 DIAGNOSIS — Z98.42 CATARACT EXTRACTION STATUS, LEFT: ICD-10-CM

## 2023-04-14 DIAGNOSIS — H40.013 AT LOW RISK FOR OPEN-ANGLE GLAUCOMA IN BOTH EYES: ICD-10-CM

## 2023-04-14 DIAGNOSIS — Z83.511 FAMILY HISTORY OF GLAUCOMA IN MOTHER: ICD-10-CM

## 2023-04-14 PROCEDURE — 99999 PR PBB SHADOW E&M-EST. PATIENT-LVL III: ICD-10-PCS | Mod: PBBFAC,,, | Performed by: OPHTHALMOLOGY

## 2023-04-14 PROCEDURE — 1159F MED LIST DOCD IN RCRD: CPT | Mod: CPTII,S$GLB,, | Performed by: OPHTHALMOLOGY

## 2023-04-14 PROCEDURE — 99024 PR POST-OP FOLLOW-UP VISIT: ICD-10-PCS | Mod: S$GLB,,, | Performed by: OPHTHALMOLOGY

## 2023-04-14 PROCEDURE — 99999 PR PBB SHADOW E&M-EST. PATIENT-LVL III: CPT | Mod: PBBFAC,,, | Performed by: OPHTHALMOLOGY

## 2023-04-14 PROCEDURE — 99024 POSTOP FOLLOW-UP VISIT: CPT | Mod: S$GLB,,, | Performed by: OPHTHALMOLOGY

## 2023-04-14 PROCEDURE — 1159F PR MEDICATION LIST DOCUMENTED IN MEDICAL RECORD: ICD-10-PCS | Mod: CPTII,S$GLB,, | Performed by: OPHTHALMOLOGY

## 2023-04-14 NOTE — PROGRESS NOTES
HPI     Post-op Evaluation            Comments: Phaco with IOL OD with Kenalog 04-13-23    Patient states VA has improved with surgery and happy with resuts.          Comments    TRF REFERRAL      1. PCIOL OS 3/9/23/ SY60WF 20.0/ CDE: 8.63  TRF REFERRAL      1. PCIOL OS 3/9/23/ SY60WF 20.0/ CDE: 8.63  Phaco with IOL OD with Kenalog 04-13-23  2. Low tension fareed (per TRF)  +f/h COAG - Mom      Durezol and keto QID  2. Low tension fareed (per TRF)  +f/h COAG - Mom      Durezol and keto QID          Last edited by Mary Kolb, Patient Care Assistant on 4/14/2023  9:05   AM.            Assessment /Plan     For exam results, see Encounter Report.      ICD-10-CM ICD-9-CM    1. Post-operative state  Z98.890 V45.89       2. Cataract extraction status, right  Z98.41 V45.61 PO Day 1 S/P Phaco/IOL  right eye  Doing well.    Use Prednisolone Acetate  and keto QID    Reinstructed in importance of absolute compliance with Post-OP instructions including medications, shield at bedtime, and limitation of activities. Follow up appointments in approximately one and six weeks or call immediately for increased pain, redness or vision loss.             3. At low risk for open-angle glaucoma in both eyes  H40.013 365.01 Follow IOP closely at this time       4. Family history of glaucoma in mother  Z83.511 V19.11       5. Cataract extraction status, left  Z98.42 V45.61           RETURN TO CLINIC 2 weeks, refract OU

## 2023-04-28 ENCOUNTER — OFFICE VISIT (OUTPATIENT)
Dept: OPHTHALMOLOGY | Facility: CLINIC | Age: 68
End: 2023-04-28
Payer: MEDICARE

## 2023-04-28 DIAGNOSIS — Z98.42 CATARACT EXTRACTION STATUS, LEFT: ICD-10-CM

## 2023-04-28 DIAGNOSIS — Z98.41 CATARACT EXTRACTION STATUS, RIGHT: ICD-10-CM

## 2023-04-28 DIAGNOSIS — H40.013 AT LOW RISK FOR OPEN-ANGLE GLAUCOMA IN BOTH EYES: ICD-10-CM

## 2023-04-28 DIAGNOSIS — Z98.890 POST-OPERATIVE STATE: Primary | ICD-10-CM

## 2023-04-28 PROCEDURE — 99999 PR PBB SHADOW E&M-EST. PATIENT-LVL III: ICD-10-PCS | Mod: PBBFAC,,, | Performed by: OPHTHALMOLOGY

## 2023-04-28 PROCEDURE — 99024 PR POST-OP FOLLOW-UP VISIT: ICD-10-PCS | Mod: S$GLB,,, | Performed by: OPHTHALMOLOGY

## 2023-04-28 PROCEDURE — 99999 PR PBB SHADOW E&M-EST. PATIENT-LVL III: CPT | Mod: PBBFAC,,, | Performed by: OPHTHALMOLOGY

## 2023-04-28 PROCEDURE — 1159F PR MEDICATION LIST DOCUMENTED IN MEDICAL RECORD: ICD-10-PCS | Mod: CPTII,S$GLB,, | Performed by: OPHTHALMOLOGY

## 2023-04-28 PROCEDURE — 1159F MED LIST DOCD IN RCRD: CPT | Mod: CPTII,S$GLB,, | Performed by: OPHTHALMOLOGY

## 2023-04-28 PROCEDURE — 99024 POSTOP FOLLOW-UP VISIT: CPT | Mod: S$GLB,,, | Performed by: OPHTHALMOLOGY

## 2023-04-28 NOTE — PROGRESS NOTES
HPI     Post-op Evaluation            Comments: PCIOL OD 4/13/23          Comments    Patient states that she is using and tolerating Pred QID OD as directed -   va doing great - occas scratchy sensation       TRF REFERRAL      1. PCIOL OS 3/9/23/ SY60WF 20.0/ CDE: 8.63  Phaco with IOL OD with Kenalog 04-13-23/ SY60WF 21.0/ CDE: 10.46  2. Low tension fareed (per TRF)  +f/h COAG - Mom    Pred QID OD            Last edited by Beti Francis MA on 4/28/2023  9:18 AM.            Assessment /Plan     For exam results, see Encounter Report.      ICD-10-CM ICD-9-CM    1. Post-operative state  Z98.890 V45.89       2. Cataract extraction status, right  Z98.41 V45.61       3. At low risk for open-angle glaucoma in both eyes  H40.013 365.01       4. Cataract extraction status, left  Z98.42 V45.61           1 week PO- doing well - trace striae present OD- taper Pred slowly as instructed below   Pt given taper sheet today re: dosing   RETURN TO CLINIC 4 weeks with TRF   Pred OD ---- QID x 5/9, TID x 5/13. BID x 5/20, then QD x next visit

## 2023-05-02 ENCOUNTER — PATIENT MESSAGE (OUTPATIENT)
Dept: PHARMACY | Facility: CLINIC | Age: 68
End: 2023-05-02
Payer: MEDICARE

## 2023-05-15 ENCOUNTER — OFFICE VISIT (OUTPATIENT)
Dept: INTERNAL MEDICINE | Facility: CLINIC | Age: 68
End: 2023-05-15
Payer: MEDICARE

## 2023-05-15 VITALS
DIASTOLIC BLOOD PRESSURE: 74 MMHG | HEIGHT: 67 IN | BODY MASS INDEX: 39.62 KG/M2 | WEIGHT: 252.44 LBS | SYSTOLIC BLOOD PRESSURE: 124 MMHG | HEART RATE: 67 BPM | RESPIRATION RATE: 98 BRPM | TEMPERATURE: 99 F

## 2023-05-15 DIAGNOSIS — E66.01 SEVERE OBESITY (BMI 35.0-39.9) WITH COMORBIDITY: ICD-10-CM

## 2023-05-15 DIAGNOSIS — Z12.31 SCREENING MAMMOGRAM, ENCOUNTER FOR: ICD-10-CM

## 2023-05-15 DIAGNOSIS — M51.36 DEGENERATIVE DISC DISEASE, LUMBAR: ICD-10-CM

## 2023-05-15 DIAGNOSIS — M54.30 SCIATIC NERVE PAIN, UNSPECIFIED LATERALITY: Primary | ICD-10-CM

## 2023-05-15 DIAGNOSIS — I10 PRIMARY HYPERTENSION: ICD-10-CM

## 2023-05-15 PROCEDURE — 3008F PR BODY MASS INDEX (BMI) DOCUMENTED: ICD-10-PCS | Mod: CPTII,S$GLB,,

## 2023-05-15 PROCEDURE — 3078F DIAST BP <80 MM HG: CPT | Mod: CPTII,S$GLB,,

## 2023-05-15 PROCEDURE — 99214 PR OFFICE/OUTPT VISIT, EST, LEVL IV, 30-39 MIN: ICD-10-PCS | Mod: 25,S$GLB,,

## 2023-05-15 PROCEDURE — 1159F PR MEDICATION LIST DOCUMENTED IN MEDICAL RECORD: ICD-10-PCS | Mod: CPTII,S$GLB,,

## 2023-05-15 PROCEDURE — 3078F PR MOST RECENT DIASTOLIC BLOOD PRESSURE < 80 MM HG: ICD-10-PCS | Mod: CPTII,S$GLB,,

## 2023-05-15 PROCEDURE — 99214 OFFICE O/P EST MOD 30 MIN: CPT | Mod: 25,S$GLB,,

## 2023-05-15 PROCEDURE — 1125F PR PAIN SEVERITY QUANTIFIED, PAIN PRESENT: ICD-10-PCS | Mod: CPTII,S$GLB,,

## 2023-05-15 PROCEDURE — 3074F PR MOST RECENT SYSTOLIC BLOOD PRESSURE < 130 MM HG: ICD-10-PCS | Mod: CPTII,S$GLB,,

## 2023-05-15 PROCEDURE — 1101F PT FALLS ASSESS-DOCD LE1/YR: CPT | Mod: CPTII,S$GLB,,

## 2023-05-15 PROCEDURE — 1101F PR PT FALLS ASSESS DOC 0-1 FALLS W/OUT INJ PAST YR: ICD-10-PCS | Mod: CPTII,S$GLB,,

## 2023-05-15 PROCEDURE — 3008F BODY MASS INDEX DOCD: CPT | Mod: CPTII,S$GLB,,

## 2023-05-15 PROCEDURE — 3288F FALL RISK ASSESSMENT DOCD: CPT | Mod: CPTII,S$GLB,,

## 2023-05-15 PROCEDURE — 1125F AMNT PAIN NOTED PAIN PRSNT: CPT | Mod: CPTII,S$GLB,,

## 2023-05-15 PROCEDURE — 1159F MED LIST DOCD IN RCRD: CPT | Mod: CPTII,S$GLB,,

## 2023-05-15 PROCEDURE — 99999 PR PBB SHADOW E&M-EST. PATIENT-LVL V: CPT | Mod: PBBFAC,,,

## 2023-05-15 PROCEDURE — 3074F SYST BP LT 130 MM HG: CPT | Mod: CPTII,S$GLB,,

## 2023-05-15 PROCEDURE — 96372 PR INJECTION,THERAP/PROPH/DIAG2ST, IM OR SUBCUT: ICD-10-PCS | Mod: S$GLB,,,

## 2023-05-15 PROCEDURE — 1160F RVW MEDS BY RX/DR IN RCRD: CPT | Mod: CPTII,S$GLB,,

## 2023-05-15 PROCEDURE — 1160F PR REVIEW ALL MEDS BY PRESCRIBER/CLIN PHARMACIST DOCUMENTED: ICD-10-PCS | Mod: CPTII,S$GLB,,

## 2023-05-15 PROCEDURE — 96372 THER/PROPH/DIAG INJ SC/IM: CPT | Mod: S$GLB,,,

## 2023-05-15 PROCEDURE — 99999 PR PBB SHADOW E&M-EST. PATIENT-LVL V: ICD-10-PCS | Mod: PBBFAC,,,

## 2023-05-15 PROCEDURE — 3288F PR FALLS RISK ASSESSMENT DOCUMENTED: ICD-10-PCS | Mod: CPTII,S$GLB,,

## 2023-05-15 RX ORDER — DICLOFENAC SODIUM 75 MG/1
75 TABLET, DELAYED RELEASE ORAL 2 TIMES DAILY
Qty: 30 TABLET | Refills: 0 | Status: SHIPPED | OUTPATIENT
Start: 2023-05-15 | End: 2023-12-13

## 2023-05-15 RX ORDER — KETOROLAC TROMETHAMINE 30 MG/ML
30 INJECTION, SOLUTION INTRAMUSCULAR; INTRAVENOUS
Status: COMPLETED | OUTPATIENT
Start: 2023-05-15 | End: 2023-05-15

## 2023-05-15 RX ORDER — METHYLPREDNISOLONE 4 MG/1
TABLET ORAL
Qty: 21 EACH | Refills: 0 | Status: SHIPPED | OUTPATIENT
Start: 2023-05-15 | End: 2023-06-05

## 2023-05-15 RX ORDER — DICLOFENAC SODIUM 10 MG/G
GEL TOPICAL
Qty: 100 G | Refills: 1 | Status: SHIPPED | OUTPATIENT
Start: 2023-05-15

## 2023-05-15 RX ORDER — METHOCARBAMOL 750 MG/1
750 TABLET, FILM COATED ORAL 4 TIMES DAILY
Qty: 40 TABLET | Refills: 0 | Status: SHIPPED | OUTPATIENT
Start: 2023-05-15 | End: 2023-05-25

## 2023-05-15 RX ADMIN — KETOROLAC TROMETHAMINE 30 MG: 30 INJECTION, SOLUTION INTRAMUSCULAR; INTRAVENOUS at 11:05

## 2023-05-15 NOTE — PROGRESS NOTES
Casie Frias Givens  05/15/2023  [id number]    Rossana Ang MD  Patient Care Team:  Rossana Ang MD as PCP - General (Family Medicine)  Radha Foley LPN as Care Coordinator (Internal Medicine)  Rita Sawant, PharmD as Pharmacist (Oncology)  Gerri Meyers RD (Inactive) as Dietitian (Nutrition)  Jessica Brink, PharmD as Hypertension Digital Medicine Clinician (Pharmacist)  Rossana Ang MD as Hypertension Digital Medicine Responsible Provider (Family Medicine)  Madelaine Kraus as Digital Medicine Health           Visit Type:an urgent visit for a new problem    Chief Complaint:  Chief Complaint   Patient presents with    Back Pain        History of Present Illness:    Lower back pain started on last week   Started after mopping her kitchen  She does have chronic back pain   Describes it as throbbing/constant and intense   Pain does radiate down both  legs  Negative for bowel or bladder dysfunction    She has been seen by ortho before    Had similar back pain  PT did help  She is interested in trying PT again     History:  Past Medical History:   Diagnosis Date    Anemia     Anxiety     Arthritis     knees    Cancer     ovarian    CHF (congestive heart failure)     Hx antineoplastic chemotherapy     last 2019    Hyperlipemia     Hypertension     Neck pain     Ovarian cancer 2019    CHEMO    Peritoneal carcinomatosis 2019     Past Surgical History:   Procedure Laterality Date    breast reduction  10/02/2018     SECTION      X 1    COLONOSCOPY N/A 2021    Procedure: COLONOSCOPY;  Surgeon: Paulette Rojas MD;  Location: [facility] ENDO;  Service: Gastroenterology;  Laterality: N/A;    CYSTOSCOPY W/ URETERAL STENT PLACEMENT Right 2019    Procedure: CYSTOSCOPY, WITH URETERAL STENT INSERTION;  Surgeon: Timmy Santiago IV, MD;  Location: [facility] OR;  Service: Urology;  Laterality: Right;    CYSTOSCOPY W/ URETERAL STENT REMOVAL  10/04/2019    DILATION AND CURETTAGE OF UTERUS       HYSTERECTOMY      RALH for fibroids (still has ovaries)    INSERTION OF VENOUS ACCESS PORT Left 2019    Procedure: INSERTION, VENOUS ACCESS PORT;  Surgeon: Ulisses Monzon MD;  Location: [facility] OR;  Service: General;  Laterality: Left;  Left internal jugular     LYSIS OF ADHESIONS OF URETER N/A 8/15/2019    Procedure: URETEROLYSIS;  Surgeon: Ismael Juarez MD;  Location: [facility] OR;  Service: OB/GYN;  Laterality: N/A;    OMENTECTOMY N/A 8/15/2019    Procedure: OMENTECTOMY;  Surgeon: Ismael Juarez MD;  Location: [facility] OR;  Service: OB/GYN;  Laterality: N/A;    RETROGRADE PYELOGRAPHY Right 2019    Procedure: PYELOGRAM, RETROGRADE;  Surgeon: Timmy Santiago IV, MD;  Location: [facility];  Service: Urology;  Laterality: Right;    ROBOT-ASSISTED LAPAROSCOPIC SALPINGO-OOPHORECTOMY USING DA TIA XI Bilateral 8/15/2019    Procedure: XI ROBOTIC SALPINGO-OOPHORECTOMY;  Surgeon: Ismael Juarez MD;  Location: [facility];  Service: OB/GYN;  Laterality: Bilateral;    TOTAL REDUCTION MAMMOPLASTY  2018    TUBAL LIGATION       Family History   Problem Relation Age of Onset    Glaucoma Mother     Colon cancer Brother     Breast cancer Maternal Aunt     Breast cancer Paternal Aunt     Ovarian cancer Paternal Aunt     Anesthesia problems Other     Thrombophilia Neg Hx      Social History     Socioeconomic History    Marital status:    Tobacco Use    Smoking status: Former     Packs/day: 1.00     Years: 25.00     Pack years: 25.00     Types: Cigarettes     Quit date: 10/1/2018     Years since quittin.6    Smokeless tobacco: Never    Tobacco comments:     States started quit 2 months ago after 30 years   Substance and Sexual Activity    Alcohol use: Yes     Comment: occasionally  No alcohol 72h prior to sx    Drug use: No    Sexual activity: Not Currently     Partners: Male     Comment: hyst; mut monog   Social History Narrative    Live w/ spouse and 2 dogs      Patient Active Problem List   Diagnosis    Hypertension     Osteoarthritis of both knees    History of CHF (congestive heart failure)    Hyperlipidemia    Peritoneal carcinomatosis    Morbid obesity    Status post placement of ureteral stent    Abnormal ECG    Pulmonary HTN    Ovarian cancer, bilateral    S/P BSO (bilateral salpingo-oophorectomy)    Encounter for antineoplastic chemotherapy    Anxiety    DDD (degenerative disc disease), cervical    Family history of colon cancer    Acute right-sided thoracic back pain    Acute right-sided low back pain without sciatica     Review of patient's allergies indicates:  No Known Allergies    The following were reviewed at this visit: active problem list, medication list, allergies, family history, social history, and health maintenance.    Medications:  Current Outpatient Medications on File Prior to Visit   Medication Sig Dispense Refill    albuterol 90 mcg/actuation inhaler Inhale 2 puffs into the lungs every 4 (four) hours as needed for Wheezing. Rescue 18 g 0    ALPRAZolam (XANAX) 0.25 MG tablet Take 1 tablet (0.25 mg total) by mouth 3 (three) times daily as needed for Anxiety. 60 tablet 2    amLODIPine (NORVASC) 5 MG tablet Take 2 tablets (10 mg total) by mouth once daily. 60 tablet 11    atenoloL (TENORMIN) 25 MG tablet Take 1 tablet (25 mg total) by mouth once daily. 90 tablet 3    azelastine (ASTELIN) 137 mcg (0.1 %) nasal spray 1 spray (137 mcg total) by Nasal route 2 (two) times daily. 30 mL 0    biotin 1 mg tablet Take 1,000 mcg by mouth once daily.       cetirizine (ZYRTEC) 10 MG tablet Take 1 tablet (10 mg total) by mouth once daily. 30 tablet 5    diclofenac sodium (VOLTAREN) 1 % Gel Apply once a day to back as needed 100 g 1    EPINEPHrine (EPIPEN) 0.3 mg/0.3 mL AtIn Inject 0.3 mLs (0.3 mg total) into the muscle once. for 1 dose 2 each 0    fluticasone propionate (FLONASE) 50 mcg/actuation nasal spray 1 spray (50 mcg total) by Each Nostril route every 12 (twelve) hours as needed for Rhinitis. (Patient not taking:  Reported on 4/14/2023) 16 g 0    gabapentin (NEURONTIN) 100 MG capsule Take 1 capsule (100 mg total) by mouth 3 (three) times daily. 90 capsule 11    ginkgo biloba 40 mg Tab Take 40 mg by mouth.      hydrOXYzine HCL (ATARAX) 25 MG tablet Take 1 tablet (25 mg total) by mouth 3 (three) times daily as needed for Itching. (Patient not taking: Reported on 4/14/2023) 90 tablet 1    multivitamin with minerals tablet Take 1 tablet by mouth once daily.       olmesartan-hydrochlorothiazide (BENICAR HCT) 40-25 mg per tablet Take 1 tablet by mouth once daily. 90 tablet 1    prednisoLONE acetate (PRED FORTE) 1 % DrpS Place 1 drop into both eyes 4 (four) times daily. 5 mL 1    rosuvastatin (CRESTOR) 10 MG tablet Take 1 tablet (10 mg total) by mouth once daily. 90 tablet 1    sertraline (ZOLOFT) 25 MG tablet Take 1 tablet (25 mg total) by mouth once daily. 90 tablet 3    sumatriptan (IMITREX) 50 MG tablet Take 1 tablet (50 mg total) by mouth every 4 to 6 hours as needed for Migraine. 30 tablet 1    zinc gluconate 50 mg tablet Take 50 mg by mouth once daily.       No current facility-administered medications on file prior to visit.       Medications have been reviewed and reconciled with patient at this visit.  Barriers to medications reviewed with patient.    Adverse reactions to current medications reviewed with patient..    Over the counter medications reviewed and reconciled with patient.    Exam:  Wt Readings from Last 3 Encounters:   12/12/22 115.7 kg (255 lb 1.2 oz)   12/06/22 116.4 kg (256 lb 9.9 oz)   12/04/22 115.2 kg (254 lb)     Temp Readings from Last 3 Encounters:   12/12/22 97 °F (36.1 °C) (Tympanic)   12/06/22 98.5 °F (36.9 °C) (Oral)   12/04/22 98.7 °F (37.1 °C)     BP Readings from Last 3 Encounters:   12/12/22 120/70   12/06/22 (!) 160/86   12/04/22 139/67     Pulse Readings from Last 3 Encounters:   12/12/22 75   12/06/22 87   12/04/22 74     There is no height or weight on file to calculate BMI.      Review of  Systems   Respiratory:  Negative for shortness of breath.    Cardiovascular:  Negative for chest pain.   Musculoskeletal:  Positive for back pain. Negative for falls.   Physical Exam  Vitals and nursing note reviewed.   Constitutional:       General: She is not in acute distress.     Appearance: She is well-developed. She is obese. She is not diaphoretic.   HENT:      Head: Normocephalic and atraumatic.      Right Ear: External ear normal.      Left Ear: External ear normal.   Eyes:      General:         Right eye: No discharge.         Left eye: No discharge.      Conjunctiva/sclera: Conjunctivae normal.      Pupils: Pupils are equal, round, and reactive to light.   Neck:      Thyroid: No thyromegaly.   Cardiovascular:      Rate and Rhythm: Normal rate and regular rhythm.      Heart sounds: Normal heart sounds. No murmur heard.  Pulmonary:      Effort: Pulmonary effort is normal. No respiratory distress.      Breath sounds: Normal breath sounds. No wheezing.   Abdominal:      General: Bowel sounds are normal.   Musculoskeletal:         General: Tenderness present.      Lumbar back: Spasms and tenderness present. Decreased range of motion.      Comments: Positive straight leg test    Neurological:      Mental Status: She is alert and oriented to person, place, and time.   Psychiatric:         Behavior: Behavior normal.         Thought Content: Thought content normal.         Judgment: Judgment normal.       Laboratory Reviewed ({Yes)  Lab Results   Component Value Date    WBC 8.76 12/14/2022    HGB 11.8 (L) 12/14/2022    HCT 37.3 12/14/2022     12/14/2022    CHOL 163 01/06/2022    TRIG 71 01/06/2022    HDL 51 01/06/2022    ALT 23 12/14/2022    AST 24 12/14/2022     12/14/2022    K 3.8 12/14/2022     12/14/2022    CREATININE 0.8 12/14/2022    BUN 18 12/14/2022    CO2 26 12/14/2022    TSH 1.869 04/19/2013    INR 1.0 01/28/2019       Casie was seen today for back pain.    Diagnoses and all orders for  this visit:    Sciatic nerve pain, unspecified laterality  -     ketorolac injection 30 mg  -     methylPREDNISolone (MEDROL DOSEPACK) 4 mg tablet; follow packsge direction  -     methocarbamoL (ROBAXIN) 750 MG Tab; Take 1 tablet (750 mg total) by mouth 4 (four) times daily. for 10 days  -     Ambulatory referral/consult to Physical/Occupational Therapy; Future  -     diclofenac (VOLTAREN) 75 MG EC tablet; Take 1 tablet (75 mg total) by mouth 2 (two) times daily.  -     diclofenac sodium (VOLTAREN) 1 % Gel; Apply once a day to back as needed    Severe obesity (BMI 35.0-39.9) with comorbidity  Encouraged healthy diet and exercise as tolerated to help bring BMI into normal range.      Primary hypertension  At visit, Blood pressure is at goal. Continue current medications        Degenerative disc disease, lumbar      If no improvement would benefit from referral to back and spine clinic  Would like to try PT again    Patient was given stretches to help with musculoskeletal pain  Discussed using OTC pain and lidocaine patches and Soaking in warm water water with epsom salt        Care Plan/Goals: Reviewed    Goals         Blood Pressure < 130/80 (pt-stated)       Exercise at least 150 minutes per week.       Maintain a low sodium diet       American Heart Association (AHA) guidelines recommend less than 1500 mg of dietary salt per day.        Take at least one BP reading per week at various times of the day             Follow up: No follow-ups on file.    After visit summary was printed and given to patient upon discharge today.  Patient goals and care plan are included in After Visit Summary.

## 2023-05-22 ENCOUNTER — CLINICAL SUPPORT (OUTPATIENT)
Dept: REHABILITATION | Facility: HOSPITAL | Age: 68
End: 2023-05-22
Payer: MEDICARE

## 2023-05-22 DIAGNOSIS — M54.30 SCIATIC NERVE PAIN, UNSPECIFIED LATERALITY: Primary | ICD-10-CM

## 2023-05-22 PROCEDURE — 97112 NEUROMUSCULAR REEDUCATION: CPT | Mod: PN

## 2023-05-22 PROCEDURE — 97161 PT EVAL LOW COMPLEX 20 MIN: CPT | Mod: PN

## 2023-05-23 PROBLEM — M54.30 SCIATIC NERVE PAIN: Status: ACTIVE | Noted: 2023-05-23

## 2023-05-24 NOTE — PLAN OF CARE
[facility]   Physical Therapy Initial Evaluation     Date: 5/22/2023   Name: Casie Gaines  Clinic Number: [id number]    Therapy Diagnosis:   Encounter Diagnosis   Name Primary?    Sciatic nerve pain, unspecified laterality Yes     Physician: Cathy Echeverria, NP    Physician Orders: PT Eval and Treat   Medical Diagnosis from Referral: Sciatic Nerve pain  Evaluation Date: 5/22/2023  Authorization Period Expiration: 05/23/2024  Plan of Care Expiration: 07/21/2023  Progress Note Due: 06/21/2023  Visit # / Visits authorized: 1/1   FOTO: 1/3     Precautions: cancer    Time In: 1230  Time Out: 1315  Total Appointment Time (timed & untimed codes): 23 minutes      SUBJECTIVE   Date of onset: 2 weeks ago    History of current condition - Casie reports: mopping house 2 weeks ago and the next day developed right sided pain.  She reports more recently she developed upper back tingling sensation.      Imaging,:  None    Prior Therapy: Yes  Social History: lives with their family  Occupation: None  Prior Level of Function: Independent  Current Level of Function: Uppert back and low back.    Pain:  Current 4/10, worst 10/10, best 4/10   Location: bilateral back  and trunk   Description: Aching (low back), Upper back tingling  Aggravating Factors: Stiff in am getting OOB  Easing Factors: pain medication    Patients goals:   [x] Decrease pain  [] Increase strength  [x] Increase range of motion   [] Return to work  [] Increase endurance  [x] Return to prior level of function  [] Return to sports  [] Other: N/A      Medical History:   Past Medical History:   Diagnosis Date    Anemia     Anxiety     Arthritis     knees    Cancer     ovarian    CHF (congestive heart failure)     Hx antineoplastic chemotherapy     last 6/2019    Hyperlipemia     Hypertension     Neck pain     Ovarian cancer 2019    CHEMO    Peritoneal carcinomatosis 1/26/2019       Surgical History:   Casie Gaines  has a past  surgical history that includes  section; Dilation and curettage of uterus; Hysterectomy; Tubal ligation; breast reduction (10/02/2018); Cystoscopy w/ ureteral stent placement (Right, 2019); Retrograde pyelography (Right, 2019); Insertion of venous access port (Left, 2019); Total Reduction Mammoplasty (2018); Robot-assisted laparoscopic salpingo-oophorectomy using da Laisah Xi (Bilateral, 8/15/2019); Omentectomy (N/A, 8/15/2019); Lysis of adhesions of ureter (N/A, 8/15/2019); Cystoscopy w/ ureteral stent removal (10/04/2019); and Colonoscopy (N/A, 2021).    Medications:   Casie has a current medication list which includes the following prescription(s): albuterol, alprazolam, amlodipine, atenolol, azelastine, biotin, cetirizine, diclofenac, diclofenac sodium, epinephrine, fluticasone propionate, gabapentin, ginkgo biloba, hydroxyzine hcl, methocarbamol, methylprednisolone, multivitamin with minerals, olmesartan-hydrochlorothiazide, prednisolone acetate, rosuvastatin, sertraline, sumatriptan, and zinc gluconate.    Allergies:   Review of patient's allergies indicates:  No Known Allergies       OBJECTIVE     Sensation:  Sensation is intact to light touch    Structural Inspection:     ROM   %    Lumbar Forward Bending To mid shin ---   Lumbar Backward Bending  50 ---    Right (%) Left (%)   Lumbar Sidebending 100 70    Right (degrees) Left (degrees)   Hip Flexion 115 115   Hip Extension  15 15     Strength   Right  Left   Gluteus Sunday 4-/5 4-/5   Gluteus Medius 4-/5 4-/5   Psoas 3+/5 3+/5   Quadriceps 4+/5 4+/5   Hamstrings 4+/5 4+/5     Special Tests:   Test Right Left   SLR Test negative negative   SLUMP  negative negative     Palpation: Tenderness lateral trunk and spine, Right       Limitation/Restriction for FOTO Lumbar Survey    Therapist reviewed FOTO scores for Casie Frias Givens on 2023.   FOTO documents entered into EPIC - see Media section.    Limitation Score: 56%        TREATMENT     Total Treatment time (time-based codes) separate from Evaluation: 22 minutes      Casie received the treatments listed below:       NEUROMUSCULAR RE-EDUCATION ACTIVITIES to improve Coordination, Sense, and Proprioception for 23 minutes.  The following were included: .   Open books  Single knee to chest  QL Stretch  Lower trunk rotation  Clamshells  Prone hip extension    PATIENT EDUCATION AND HOME EXERCISES     Education provided:   - Home program    Written Home Exercises Provided: yes. Exercises were reviewed and Casie was able to demonstrate them prior to the end of the session.  Casie demonstrated good  understanding of the education provided. See EMR under Patient Instructions for exercises provided during therapy sessions.    ASSESSMENT     Casie is a 67 y.o. female referred to outpatient Physical Therapy with a medical diagnosis of Sciatic nerve pain. Patient presents with lateral pain low back pain with impaired mobility.  She has decreased lumbar motion, pain with walking, and weakness.    Patient prognosis is Good.   Patientt will benefit from skilled outpatient Physical Therapy to address the deficits stated above and in the chart below, provide patient /family education, and to maximize patientt's level of independence.     Plan of care discussed with patient: Yes  Patient's spiritual, cultural and educational needs considered and patient is agreeable to the plan of care and goals as stated below:     Anticipated Barriers for therapy: None    Medical Necessity is demonstrated by the following  History  Co-morbidities and personal factors that may impact the plan of care Co-morbidities:   See above    Personal Factors:   no deficits     low   Examination  Body Structures and Functions, activity limitations and participation restrictions that may impact the plan of care Body Regions:   back  lower extremities    Body Systems:    ROM  strength  gait    Participation Restrictions:    None    Activity limitations:   Learning and applying knowledge  no deficits    General Tasks and Commands  no deficits    Communication  no deficits    Mobility  lifting and carrying objects  walking    Self care  caring for body parts (brushing teeth, shaving, grooming)  looking after one's health    Domestic Life  shopping  cooking  doing house work (cleaning house, washing dishes, laundry)  assisting others    Interactions/Relationships  no deficits    Life Areas  no deficits    Community and Social Life  community life  recreation and leisure         low   Clinical Presentation stable and uncomplicated    Decision Making/ Complexity Score: low     Goals:  Short Term Goals: In 4 weeks   1. I with HEP  2. Pt to increase lumbar ROM from mid shin to proximal ankle    3. Pt to have pain less than 6/10 at all times.    Long Term Goals: In 8 weeks  1.  Pt will improve FOTO disability score to 40% disability or less in order to improve overall QOL and return to PLOF.    2.  Patient to demo increase in LE strength to 5/5  3.  Patient to have decreased pain to 3/10 at all times.  4.  Patient to demo increase lumbar ROM to 100%  5.  Patient to perform daily activities including walking without limitation.    PLAN   Plan of care Certification: 5/22/2023 to 07/21/2023.    Outpatient Physical Therapy 2 times weekly for 8 weeks to include the following interventions: Electrical Stimulation, Manual Therapy, Moist Heat/ Ice, Neuromuscular Re-ed, Patient Education, Therapeutic Activities, Therapeutic Exercise, and Dry Needling.     Andres Martines, PT      I CERTIFY THE NEED FOR THESE SERVICES FURNISHED UNDER THIS PLAN OF TREATMENT AND WHILE UNDER MY CARE   Physician's comments:     Physician's Signature: ___________________________________________________

## 2023-05-26 DIAGNOSIS — M54.30 SCIATIC NERVE PAIN, UNSPECIFIED LATERALITY: ICD-10-CM

## 2023-05-26 RX ORDER — DICLOFENAC SODIUM 75 MG/1
75 TABLET, DELAYED RELEASE ORAL 2 TIMES DAILY
Qty: 30 TABLET | Refills: 0 | OUTPATIENT
Start: 2023-05-26

## 2023-05-31 ENCOUNTER — OFFICE VISIT (OUTPATIENT)
Dept: OPHTHALMOLOGY | Facility: CLINIC | Age: 68
End: 2023-05-31
Payer: MEDICARE

## 2023-05-31 DIAGNOSIS — Z96.1 PSEUDOPHAKIA OF BOTH EYES: ICD-10-CM

## 2023-05-31 DIAGNOSIS — Z98.890 POST-OPERATIVE STATE: Primary | ICD-10-CM

## 2023-05-31 PROCEDURE — 99999 PR PBB SHADOW E&M-EST. PATIENT-LVL III: ICD-10-PCS | Mod: PBBFAC,,, | Performed by: OPTOMETRIST

## 2023-05-31 PROCEDURE — 99024 POSTOP FOLLOW-UP VISIT: CPT | Mod: S$GLB,,, | Performed by: OPTOMETRIST

## 2023-05-31 PROCEDURE — 92015 PR REFRACTION: ICD-10-PCS | Mod: S$GLB,,, | Performed by: OPTOMETRIST

## 2023-05-31 PROCEDURE — 1159F MED LIST DOCD IN RCRD: CPT | Mod: CPTII,S$GLB,, | Performed by: OPTOMETRIST

## 2023-05-31 PROCEDURE — 1159F PR MEDICATION LIST DOCUMENTED IN MEDICAL RECORD: ICD-10-PCS | Mod: CPTII,S$GLB,, | Performed by: OPTOMETRIST

## 2023-05-31 PROCEDURE — 99024 PR POST-OP FOLLOW-UP VISIT: ICD-10-PCS | Mod: S$GLB,,, | Performed by: OPTOMETRIST

## 2023-05-31 PROCEDURE — 92015 DETERMINE REFRACTIVE STATE: CPT | Mod: S$GLB,,, | Performed by: OPTOMETRIST

## 2023-05-31 PROCEDURE — 99999 PR PBB SHADOW E&M-EST. PATIENT-LVL III: CPT | Mod: PBBFAC,,, | Performed by: OPTOMETRIST

## 2023-05-31 NOTE — PROGRESS NOTES
HPI    1 month S/P cataract SX OD per [facility].  No visual complaints.  Last eye visit 04/28/2023 [facility]   Last eye visit with [facility] 02/08/2023.  Pred  daily OD  Last edited by Dianne Swan MA on 5/31/2023 10:54 AM.            Assessment /Plan     For exam results, see Encounter Report.    Post-operative state    Pseudophakia of both eyes      Stable IOL OU.    D/C PF    Dispense Final Rx for glasses or may use OTC glasses.  RTC 6 months DFE  Discussed above and answered questions.

## 2023-06-01 ENCOUNTER — CLINICAL SUPPORT (OUTPATIENT)
Dept: REHABILITATION | Facility: HOSPITAL | Age: 68
End: 2023-06-01
Payer: MEDICARE

## 2023-06-01 DIAGNOSIS — M54.30 SCIATIC NERVE PAIN, UNSPECIFIED LATERALITY: Primary | ICD-10-CM

## 2023-06-01 PROCEDURE — 97110 THERAPEUTIC EXERCISES: CPT | Mod: PN

## 2023-06-01 PROCEDURE — 97112 NEUROMUSCULAR REEDUCATION: CPT | Mod: PN

## 2023-06-02 NOTE — PROGRESS NOTES
[facility]   Physical Therapy Treatment Note      Name: Casie Gaines  Clinic Number: [id number]    Therapy Diagnosis:   Encounter Diagnosis   Name Primary?    Sciatic nerve pain, unspecified laterality Yes     Physician: Cathy Echeverria NP    Visit Date: 6/1/2023    Physician Orders: PT Eval and Treat   Medical Diagnosis from Referral: Sciatic Nerve pain  Evaluation Date: 5/22/2023  Authorization Period Expiration: 05/23/2024  Plan of Care Expiration: 07/21/2023  Progress Note Due: 06/21/2023  Visit # / Visits authorized: 1/1   FOTO: 1/3      Precautions: cancer    PTA Visit #: 0/5     Time In: 1230  Time Out: 1323  Total Billable Time: 53 minutes    Subjective     Pt reports: low back pain into legs.  She was compliant with home exercise program.  Response to previous treatment: N/A  Functional change: N/A    Pain: 5/10  Location: bilateral arms     Objective      Objective Measures updated at progress report unless specified.     Treatment     Casie received the treatments listed below:      therapeutic exercises to develop strength, posture, and core stabilization for 10 minutes including:  NuStep 5 min for repetitive hip motion and postural cues  Prone hip extension 3x10    neuromuscular re-education activities to improve: Coordination, Kinesthetic, Proprioception, and Posture for 43 minutes. The following activities were included:  Matrix lumbar extension 40# x20  Matrix rotation 20# x20  Seated ball roll outs 2 minutes  Supine DKTC with ball 2x15   Lower trunk rotation 2x15  Posterior pelvic tilt with cues      Patient Education and Home Exercises       Education provided:   - Home program review     Written Home Exercises Provided: Patient instructed to cont prior HEP. Exercises were reviewed and Casie was able to demonstrate them prior to the end of the session.  Casie demonstrated good  understanding of the education provided. See EMR under Patient Instructions for  exercises provided during therapy sessions    Assessment     The patient has back pain with associated leg pain.  She was instructed in abdominal bracing and lumbar/core strengthening    Casie Is progressing well towards her goals.   Pt prognosis is Good.     Pt will continue to benefit from skilled outpatient physical therapy to address the deficits listed in the problem list box on initial evaluation, provide pt/family education and to maximize pt's level of independence in the home and community environment.     Pt's spiritual, cultural and educational needs considered and pt agreeable to plan of care and goals.     Anticipated barriers to physical therapy: None    Goals:   Short Term Goals: In 4 weeks   1. I with HEP  2. Pt to increase lumbar ROM from mid shin to proximal ankle    3. Pt to have pain less than 6/10 at all times.     Long Term Goals: In 8 weeks  1.  Pt will improve FOTO disability score to 40% disability or less in order to improve overall QOL and return to PLOF.    2.  Patient to demo increase in LE strength to 5/5  3.  Patient to have decreased pain to 3/10 at all times.  4.  Patient to demo increase lumbar ROM to 100%  5.  Patient to perform daily activities including walking without limitation.    Plan     Plan of care Certification: 5/22/2023 to 07/21/2023.     Outpatient Physical Therapy 2 times weekly for 8 weeks to include the following interventions: Electrical Stimulation, Manual Therapy, Moist Heat/ Ice, Neuromuscular Re-ed, Patient Education, Therapeutic Activities, Therapeutic Exercise, and Dry Needling.        Andres Martines, PT

## 2023-06-05 ENCOUNTER — CLINICAL SUPPORT (OUTPATIENT)
Dept: REHABILITATION | Facility: HOSPITAL | Age: 68
End: 2023-06-05
Payer: MEDICARE

## 2023-06-05 DIAGNOSIS — M54.30 SCIATIC NERVE PAIN, UNSPECIFIED LATERALITY: Primary | ICD-10-CM

## 2023-06-05 PROCEDURE — 97110 THERAPEUTIC EXERCISES: CPT | Mod: PN

## 2023-06-05 PROCEDURE — 97112 NEUROMUSCULAR REEDUCATION: CPT | Mod: PN

## 2023-06-05 NOTE — PROGRESS NOTES
[facility]   Physical Therapy Treatment Note      Name: Casie Gaines  Clinic Number: [id number]    Therapy Diagnosis:   No diagnosis found.    Physician: Cathy Echeverria NP    Visit Date: 6/5/2023    Physician Orders: PT Eval and Treat   Medical Diagnosis from Referral: Sciatic Nerve pain  Evaluation Date: 5/22/2023  Authorization Period Expiration: 05/23/2024  Plan of Care Expiration: 07/21/2023  Progress Note Due: 06/21/2023  Visit # / Visits authorized: 1/1; 2/20  FOTO: 1/3      Precautions: cancer    PTA Visit #: 0/5     Time In: 1237  Time Out:1324  Total Billable Time: 57 minutes    Subjective     Pt reports: she had a good walk this morning  She was compliant with home exercise program.  Response to previous treatment: N/A  Functional change: N/A    Pain: 5/10  Location: bilateral arms     Objective      Objective Measures updated at progress report unless specified.     Treatment     Casie received the treatments listed below:      therapeutic exercises to develop strength, posture, and core stabilization for 18 minutes including:  NuStep 5 min for repetitive hip motion and postural cues  Prone hip extension 3x10  Seated ball roll outs 2 minutes  Prone hip extension  Prone quad stretch    neuromuscular re-education activities to improve: Coordination, Kinesthetic, Proprioception, and Posture for 39 minutes. The following activities were included:  Matrix lumbar extension 40# x20  Matrix rotation 20# x20  Posterior pelvic tilt with cues  Supine DKTC with ball 2x15   Lower trunk rotation 2x15  Clamshells   Prone quad stretch      Patient Education and Home Exercises       Education provided:   - Home program review     Written Home Exercises Provided: Patient instructed to cont prior HEP. Exercises were reviewed and Casie was able to demonstrate them prior to the end of the session.  Casie demonstrated good  understanding of the education provided. See EMR under Patient  Instructions for exercises provided during therapy sessions    Assessment     The patient has back pain with associated leg pain.  She was instructed in abdominal bracing and lumbar/core strengthening    Casie Is progressing well towards her goals.   Pt prognosis is Good.     Pt will continue to benefit from skilled outpatient physical therapy to address the deficits listed in the problem list box on initial evaluation, provide pt/family education and to maximize pt's level of independence in the home and community environment.     Pt's spiritual, cultural and educational needs considered and pt agreeable to plan of care and goals.     Anticipated barriers to physical therapy: None    Goals:   Short Term Goals: In 4 weeks   1. I with HEP  2. Pt to increase lumbar ROM from mid shin to proximal ankle    3. Pt to have pain less than 6/10 at all times.     Long Term Goals: In 8 weeks  1.  Pt will improve FOTO disability score to 40% disability or less in order to improve overall QOL and return to PLOF.    2.  Patient to demo increase in LE strength to 5/5  3.  Patient to have decreased pain to 3/10 at all times.  4.  Patient to demo increase lumbar ROM to 100%  5.  Patient to perform daily activities including walking without limitation.    Plan     Plan of care Certification: 5/22/2023 to 07/21/2023.     Outpatient Physical Therapy 2 times weekly for 8 weeks to include the following interventions: Electrical Stimulation, Manual Therapy, Moist Heat/ Ice, Neuromuscular Re-ed, Patient Education, Therapeutic Activities, Therapeutic Exercise, and Dry Needling.        Andres Martines, PT

## 2023-06-08 ENCOUNTER — CLINICAL SUPPORT (OUTPATIENT)
Dept: REHABILITATION | Facility: HOSPITAL | Age: 68
End: 2023-06-08
Payer: MEDICARE

## 2023-06-08 DIAGNOSIS — M54.30 SCIATIC NERVE PAIN, UNSPECIFIED LATERALITY: Primary | ICD-10-CM

## 2023-06-08 PROCEDURE — 97112 NEUROMUSCULAR REEDUCATION: CPT | Mod: PN

## 2023-06-08 PROCEDURE — 97530 THERAPEUTIC ACTIVITIES: CPT | Mod: PN

## 2023-06-08 PROCEDURE — 97110 THERAPEUTIC EXERCISES: CPT | Mod: PN

## 2023-06-08 NOTE — PROGRESS NOTES
[facility]   Physical Therapy Treatment Note      Name: Casie Gaines  Clinic Number: [id number]    Therapy Diagnosis:   No diagnosis found.    Physician: Cathy Echeverria NP    Visit Date: 6/8/2023    Physician Orders: PT Eval and Treat   Medical Diagnosis from Referral: Sciatic Nerve pain  Evaluation Date: 5/22/2023  Authorization Period Expiration: 05/23/2024  Plan of Care Expiration: 07/21/2023  Progress Note Due: 06/21/2023  Visit # / Visits authorized: 1/1; 3/20  FOTO: 1/3      Precautions: cancer    PTA Visit #: 0/5     Time In: 1237  Time Out:1332  Total Billable Time: 54 minutes    Subjective     Pt reports: she is stiff from yard work this morning  She was compliant with home exercise program.  Response to previous treatment: N/A  Functional change: N/A    Pain: 4/10  Location: bilateral arms     Objective      Objective Measures updated at progress report unless specified.     Treatment     Casie received the treatments listed below:      therapeutic exercises to develop strength, posture, and core stabilization for 16 minutes including:  NuStep 5 min for repetitive hip motion and postural cues  Seated ball roll [id number]  Prone hip extension 2x10  Prone quad stretch 3x20 seconds  Clamshells 2x10  neuromuscular re-education activities to improve: Coordination, Kinesthetic, Proprioception, and Posture for 26 minutes. The following activities were included:  Matrix lumbar extension 40# x20  Matrix rotation 26# x20  Posterior pelvic tilt with cues  Supine DKTC with ball 2x15   Lower trunk rotation 2x15  Therapeutic activities to improve functional performance for 10  minutes, including:  [place] carry 15#  Dead lifting from raised surface 20# 2x10    Patient Education and Home Exercises       Education provided:   - Home program review     Written Home Exercises Provided: Patient instructed to cont prior HEP. Exercises were reviewed and Casie was able to demonstrate them  prior to the end of the session.  Casie demonstrated good  understanding of the education provided. See EMR under Patient Instructions for exercises provided during therapy sessions    Assessment     The patient had stiffness after working in the yard.  She demonstrates tightness in the hamstrings and has bilateral knee pain.  She was instructed in lifting technique from elevated surface due to knee problems.  She able to perform hip hinge with demonstration and verbal cues.    Casie Is progressing well towards her goals.   Pt prognosis is Good.     Pt will continue to benefit from skilled outpatient physical therapy to address the deficits listed in the problem list box on initial evaluation, provide pt/family education and to maximize pt's level of independence in the home and community environment.     Pt's spiritual, cultural and educational needs considered and pt agreeable to plan of care and goals.     Anticipated barriers to physical therapy: None    Goals:   Short Term Goals: In 4 weeks   1. I with HEP  2. Pt to increase lumbar ROM from mid shin to proximal ankle    3. Pt to have pain less than 6/10 at all times.     Long Term Goals: In 8 weeks  1.  Pt will improve FOTO disability score to 40% disability or less in order to improve overall QOL and return to PLOF.    2.  Patient to demo increase in LE strength to 5/5  3.  Patient to have decreased pain to 3/10 at all times.  4.  Patient to demo increase lumbar ROM to 100%  5.  Patient to perform daily activities including walking without limitation.    Plan     Plan of care Certification: 5/22/2023 to 07/21/2023.     Outpatient Physical Therapy 2 times weekly for 8 weeks to include the following interventions: Electrical Stimulation, Manual Therapy, Moist Heat/ Ice, Neuromuscular Re-ed, Patient Education, Therapeutic Activities, Therapeutic Exercise, and Dry Needling.        Andres Martines, PT

## 2023-06-12 ENCOUNTER — OFFICE VISIT (OUTPATIENT)
Dept: INTERNAL MEDICINE | Facility: CLINIC | Age: 68
End: 2023-06-12
Payer: MEDICARE

## 2023-06-12 ENCOUNTER — HOSPITAL ENCOUNTER (OUTPATIENT)
Dept: RADIOLOGY | Facility: HOSPITAL | Age: 68
Discharge: HOME | End: 2023-06-12
Attending: FAMILY MEDICINE
Payer: MEDICARE

## 2023-06-12 ENCOUNTER — TELEPHONE (OUTPATIENT)
Dept: GYNECOLOGIC ONCOLOGY | Facility: CLINIC | Age: 68
End: 2023-06-12
Payer: MEDICARE

## 2023-06-12 VITALS
HEART RATE: 70 BPM | TEMPERATURE: 96 F | WEIGHT: 252.44 LBS | BODY MASS INDEX: 39.62 KG/M2 | OXYGEN SATURATION: 96 % | HEIGHT: 67 IN | DIASTOLIC BLOOD PRESSURE: 72 MMHG | SYSTOLIC BLOOD PRESSURE: 118 MMHG

## 2023-06-12 DIAGNOSIS — I27.20 PULMONARY HTN: ICD-10-CM

## 2023-06-12 DIAGNOSIS — M25.562 CHRONIC PAIN OF LEFT KNEE: ICD-10-CM

## 2023-06-12 DIAGNOSIS — L70.8 ACNE-LIKE SKIN BUMPS: ICD-10-CM

## 2023-06-12 DIAGNOSIS — C78.6 PERITONEAL CARCINOMATOSIS: ICD-10-CM

## 2023-06-12 DIAGNOSIS — R79.9 ABNORMAL FINDING OF BLOOD CHEMISTRY, UNSPECIFIED: ICD-10-CM

## 2023-06-12 DIAGNOSIS — G89.29 CHRONIC PAIN OF LEFT KNEE: ICD-10-CM

## 2023-06-12 DIAGNOSIS — Z90.722 S/P BSO (BILATERAL SALPINGO-OOPHORECTOMY): ICD-10-CM

## 2023-06-12 DIAGNOSIS — I10 PRIMARY HYPERTENSION: Primary | ICD-10-CM

## 2023-06-12 DIAGNOSIS — Z12.31 SCREENING MAMMOGRAM, ENCOUNTER FOR: ICD-10-CM

## 2023-06-12 PROCEDURE — 73562 XR KNEE ORTHO LEFT: ICD-10-PCS | Mod: 26,LT,, | Performed by: RADIOLOGY

## 2023-06-12 PROCEDURE — 73560 XR KNEE ORTHO LEFT: ICD-10-PCS | Mod: 26,RT,, | Performed by: RADIOLOGY

## 2023-06-12 PROCEDURE — 3288F FALL RISK ASSESSMENT DOCD: CPT | Mod: CPTII,S$GLB,, | Performed by: FAMILY MEDICINE

## 2023-06-12 PROCEDURE — 99999 PR PBB SHADOW E&M-EST. PATIENT-LVL V: CPT | Mod: PBBFAC,,, | Performed by: FAMILY MEDICINE

## 2023-06-12 PROCEDURE — 1126F AMNT PAIN NOTED NONE PRSNT: CPT | Mod: CPTII,S$GLB,, | Performed by: FAMILY MEDICINE

## 2023-06-12 PROCEDURE — 73562 X-RAY EXAM OF KNEE 3: CPT | Mod: 26,LT,, | Performed by: RADIOLOGY

## 2023-06-12 PROCEDURE — 1126F PR PAIN SEVERITY QUANTIFIED, NO PAIN PRESENT: ICD-10-PCS | Mod: CPTII,S$GLB,, | Performed by: FAMILY MEDICINE

## 2023-06-12 PROCEDURE — 1101F PR PT FALLS ASSESS DOC 0-1 FALLS W/OUT INJ PAST YR: ICD-10-PCS | Mod: CPTII,S$GLB,, | Performed by: FAMILY MEDICINE

## 2023-06-12 PROCEDURE — 3074F SYST BP LT 130 MM HG: CPT | Mod: CPTII,S$GLB,, | Performed by: FAMILY MEDICINE

## 2023-06-12 PROCEDURE — 3008F BODY MASS INDEX DOCD: CPT | Mod: CPTII,S$GLB,, | Performed by: FAMILY MEDICINE

## 2023-06-12 PROCEDURE — 1159F PR MEDICATION LIST DOCUMENTED IN MEDICAL RECORD: ICD-10-PCS | Mod: CPTII,S$GLB,, | Performed by: FAMILY MEDICINE

## 2023-06-12 PROCEDURE — 73560 X-RAY EXAM OF KNEE 1 OR 2: CPT | Mod: 26,RT,, | Performed by: RADIOLOGY

## 2023-06-12 PROCEDURE — 3074F PR MOST RECENT SYSTOLIC BLOOD PRESSURE < 130 MM HG: ICD-10-PCS | Mod: CPTII,S$GLB,, | Performed by: FAMILY MEDICINE

## 2023-06-12 PROCEDURE — 3008F PR BODY MASS INDEX (BMI) DOCUMENTED: ICD-10-PCS | Mod: CPTII,S$GLB,, | Performed by: FAMILY MEDICINE

## 2023-06-12 PROCEDURE — 99214 PR OFFICE/OUTPT VISIT, EST, LEVL IV, 30-39 MIN: ICD-10-PCS | Mod: S$GLB,,, | Performed by: FAMILY MEDICINE

## 2023-06-12 PROCEDURE — 1160F PR REVIEW ALL MEDS BY PRESCRIBER/CLIN PHARMACIST DOCUMENTED: ICD-10-PCS | Mod: CPTII,S$GLB,, | Performed by: FAMILY MEDICINE

## 2023-06-12 PROCEDURE — 99999 PR PBB SHADOW E&M-EST. PATIENT-LVL V: ICD-10-PCS | Mod: PBBFAC,,, | Performed by: FAMILY MEDICINE

## 2023-06-12 PROCEDURE — 99214 OFFICE O/P EST MOD 30 MIN: CPT | Mod: S$GLB,,, | Performed by: FAMILY MEDICINE

## 2023-06-12 PROCEDURE — 1159F MED LIST DOCD IN RCRD: CPT | Mod: CPTII,S$GLB,, | Performed by: FAMILY MEDICINE

## 2023-06-12 PROCEDURE — 3078F PR MOST RECENT DIASTOLIC BLOOD PRESSURE < 80 MM HG: ICD-10-PCS | Mod: CPTII,S$GLB,, | Performed by: FAMILY MEDICINE

## 2023-06-12 PROCEDURE — 3288F PR FALLS RISK ASSESSMENT DOCUMENTED: ICD-10-PCS | Mod: CPTII,S$GLB,, | Performed by: FAMILY MEDICINE

## 2023-06-12 PROCEDURE — 1101F PT FALLS ASSESS-DOCD LE1/YR: CPT | Mod: CPTII,S$GLB,, | Performed by: FAMILY MEDICINE

## 2023-06-12 PROCEDURE — 73560 X-RAY EXAM OF KNEE 1 OR 2: CPT | Mod: TC,RT

## 2023-06-12 PROCEDURE — 1160F RVW MEDS BY RX/DR IN RCRD: CPT | Mod: CPTII,S$GLB,, | Performed by: FAMILY MEDICINE

## 2023-06-12 PROCEDURE — 3078F DIAST BP <80 MM HG: CPT | Mod: CPTII,S$GLB,, | Performed by: FAMILY MEDICINE

## 2023-06-12 NOTE — PROGRESS NOTES
"Casie Frias Givens  06/12/2023  [id number]    Rossana Ang MD  Patient Care Team:  Rossana Ang MD as PCP - General (Family Medicine)  Radha Foley LPN as Care Coordinator (Internal Medicine)  Rita Sawant, PharmD as Pharmacist (Oncology)  Gerri Meyers RD (Inactive) as Dietitian (Nutrition)  Jessica Brink, PharmD as Hypertension Digital Medicine Clinician (Pharmacist)  Rossana Ang MD as Hypertension Digital Medicine Responsible Provider (Family Medicine)  Madelaine Kraus as Digital Medicine Health           Visit Type:a scheduled routine follow-up visit    Chief Complaint:  Chief Complaint   Patient presents with    Follow-up     6 month f/u       History of Present Illness:  67 year old  Follow up  HTN    History of ovarian cancer and peritoneal caricinomatosis  Sees Dr. Juarez at [facility].  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    She is having some left knee pain, and sharp pain when standing, and she can feel the knee gives out.  No swelling  2 years  No imaging.      History:  Past Medical History:   Diagnosis Date    Anemia     Anxiety     Arthritis     knees    Cancer     ovarian    CHF (congestive heart failure)     Hx antineoplastic chemotherapy     last 6/2019    Hyperlipemia     Hypertension     Neck pain     Ovarian cancer 2019    CHEMO    Peritoneal carcinomatosis 1/26/2019     Past Surgical History:   Procedure Laterality Date    breast reduction  10/02/2018    CATARACT " EXTRACTION Bilateral     2023     SECTION      X 1    COLONOSCOPY N/A 2021    Procedure: COLONOSCOPY;  Surgeon: Paulette Rojas MD;  Location: [facility];  Service: Gastroenterology;  Laterality: N/A;    CYSTOSCOPY W/ URETERAL STENT PLACEMENT Right 2019    Procedure: CYSTOSCOPY, WITH URETERAL STENT INSERTION;  Surgeon: Timmy Santiago IV, MD;  Location: [facility] OR;  Service: Urology;  Laterality: Right;    CYSTOSCOPY W/ URETERAL STENT REMOVAL  10/04/2019    DILATION AND CURETTAGE OF UTERUS      HYSTERECTOMY      RALH for fibroids (still has ovaries)    INSERTION OF VENOUS ACCESS PORT Left 2019    Procedure: INSERTION, VENOUS ACCESS PORT;  Surgeon: Ulisses Monzon MD;  Location: [facility] OR;  Service: General;  Laterality: Left;  Left internal jugular     LYSIS OF ADHESIONS OF URETER N/A 08/15/2019    Procedure: URETEROLYSIS;  Surgeon: Ismael Juarez MD;  Location: [facility] OR;  Service: OB/GYN;  Laterality: N/A;    OMENTECTOMY N/A 08/15/2019    Procedure: OMENTECTOMY;  Surgeon: Ismael Juarez MD;  Location: [facility] OR;  Service: OB/GYN;  Laterality: N/A;    RETROGRADE PYELOGRAPHY Right 2019    Procedure: PYELOGRAM, RETROGRADE;  Surgeon: Timmy Santiago IV, MD;  Location: [facility] OR;  Service: Urology;  Laterality: Right;    ROBOT-ASSISTED LAPAROSCOPIC SALPINGO-OOPHORECTOMY USING DA TIA XI Bilateral 08/15/2019    Procedure: XI ROBOTIC SALPINGO-OOPHORECTOMY;  Surgeon: Ismael Juarez MD;  Location: [facility];  Service: OB/GYN;  Laterality: Bilateral;    TOTAL REDUCTION MAMMOPLASTY  2018    TUBAL LIGATION       Family History   Problem Relation Age of Onset    Glaucoma Mother     Colon cancer Brother     Breast cancer Maternal Aunt     Breast cancer Paternal Aunt     Ovarian cancer Paternal Aunt     Anesthesia problems Other     Thrombophilia Neg Hx      Social History     Socioeconomic History    Marital status:    Tobacco Use    Smoking status: Former     Packs/day: 1.00     Years:  25.00     Pack years: 25.00     Types: Cigarettes     Quit date: 10/1/2018     Years since quittin.6    Smokeless tobacco: Never    Tobacco comments:     States started quit 2 months ago after 30 years   Substance and Sexual Activity    Alcohol use: Yes     Comment: occasionally  No alcohol 72h prior to sx    Drug use: No    Sexual activity: Not Currently     Partners: Male     Comment: hyst; mut monog   Social History Narrative    Live w/ spouse and 2 dogs      Patient Active Problem List   Diagnosis    Hypertension    Osteoarthritis of both knees    History of CHF (congestive heart failure)    Hyperlipidemia    Peritoneal carcinomatosis    Morbid obesity    Status post placement of ureteral stent    Abnormal ECG    Pulmonary HTN    Ovarian cancer, bilateral    S/P BSO (bilateral salpingo-oophorectomy)    Encounter for antineoplastic chemotherapy    Anxiety    DDD (degenerative disc disease), cervical    Family history of colon cancer    Acute right-sided thoracic back pain    Acute right-sided low back pain without sciatica    Sciatic nerve pain     Review of patient's allergies indicates:  No Known Allergies    The following were reviewed at this visit: active problem list, medication list, allergies, family history, social history, and health maintenance.    Medications:  Current Outpatient Medications on File Prior to Visit   Medication Sig Dispense Refill    albuterol 90 mcg/actuation inhaler Inhale 2 puffs into the lungs every 4 (four) hours as needed for Wheezing. Rescue 18 g 0    ALPRAZolam (XANAX) 0.25 MG tablet Take 1 tablet (0.25 mg total) by mouth 3 (three) times daily as needed for Anxiety. 60 tablet 2    amLODIPine (NORVASC) 5 MG tablet Take 2 tablets (10 mg total) by mouth once daily. 60 tablet 11    atenoloL (TENORMIN) 25 MG tablet Take 1 tablet (25 mg total) by mouth once daily. 90 tablet 3    azelastine (ASTELIN) 137 mcg (0.1 %) nasal spray 1 spray (137 mcg total) by Nasal route 2 (two) times  daily. 30 mL 0    biotin 1 mg tablet Take 1,000 mcg by mouth once daily.       cetirizine (ZYRTEC) 10 MG tablet Take 1 tablet (10 mg total) by mouth once daily. 30 tablet 5    diclofenac (VOLTAREN) 75 MG EC tablet Take 1 tablet (75 mg total) by mouth 2 (two) times daily. 30 tablet 0    diclofenac sodium (VOLTAREN) 1 % Gel Apply once a day to back as needed 100 g 1    EPINEPHrine (EPIPEN) 0.3 mg/0.3 mL AtIn Inject 0.3 mLs (0.3 mg total) into the muscle once. for 1 dose 2 each 0    fluticasone propionate (FLONASE) 50 mcg/actuation nasal spray 1 spray (50 mcg total) by Each Nostril route every 12 (twelve) hours as needed for Rhinitis. 16 g 0    gabapentin (NEURONTIN) 100 MG capsule Take 1 capsule (100 mg total) by mouth 3 (three) times daily. 90 capsule 11    ginkgo biloba 40 mg Tab Take 40 mg by mouth.      hydrOXYzine HCL (ATARAX) 25 MG tablet Take 1 tablet (25 mg total) by mouth 3 (three) times daily as needed for Itching. 90 tablet 1    multivitamin with minerals tablet Take 1 tablet by mouth once daily.       olmesartan-hydrochlorothiazide (BENICAR HCT) 40-25 mg per tablet Take 1 tablet by mouth once daily. 90 tablet 1    rosuvastatin (CRESTOR) 10 MG tablet Take 1 tablet (10 mg total) by mouth once daily. 90 tablet 1    sertraline (ZOLOFT) 25 MG tablet Take 1 tablet (25 mg total) by mouth once daily. 90 tablet 3    sumatriptan (IMITREX) 50 MG tablet Take 1 tablet (50 mg total) by mouth every 4 to 6 hours as needed for Migraine. 30 tablet 1    zinc gluconate 50 mg tablet Take 50 mg by mouth once daily.      [DISCONTINUED] prednisoLONE acetate (PRED FORTE) 1 % DrpS Place 1 drop into both eyes 4 (four) times daily. (Patient not taking: Reported on 6/12/2023) 5 mL 1     No current facility-administered medications on file prior to visit.       Medications have been reviewed and reconciled with patient at this visit.  Barriers to medications reviewed with patient.    Adverse reactions to current medications reviewed  with patient..    Over the counter medications reviewed and reconciled with patient.    Exam:  Wt Readings from Last 3 Encounters:   06/12/23 114.5 kg (252 lb 6.8 oz)   05/15/23 114.5 kg (252 lb 6.8 oz)   12/12/22 115.7 kg (255 lb 1.2 oz)     Temp Readings from Last 3 Encounters:   06/12/23 96.3 °F (35.7 °C) (Tympanic)   05/15/23 98.6 °F (37 °C) (Tympanic)   12/12/22 97 °F (36.1 °C) (Tympanic)     BP Readings from Last 3 Encounters:   06/12/23 118/72   05/15/23 124/74   12/12/22 120/70     Pulse Readings from Last 3 Encounters:   06/12/23 70   05/15/23 67   12/12/22 75     Body mass index is 39.54 kg/m².      Review of Systems   Constitutional: Negative.  Negative for chills and fever.   HENT: Negative.  Negative for congestion, sinus pain and sore throat.    Eyes:  Negative for blurred vision and double vision.   Respiratory:  Negative for cough, sputum production, shortness of breath and wheezing.    Cardiovascular:  Negative for chest pain, palpitations and leg swelling.   Gastrointestinal:  Negative for abdominal pain, constipation, diarrhea, heartburn, nausea and vomiting.   Genitourinary: Negative.    Musculoskeletal:  Positive for back pain and joint pain.   Skin: Negative.  Negative for rash.   Neurological: Negative.    Endo/Heme/Allergies: Negative.  Negative for polydipsia. Does not bruise/bleed easily.   Psychiatric/Behavioral:  Negative for depression and substance abuse.    Physical Exam  Nursing note reviewed.   Pulmonary:      Effort: Pulmonary effort is normal. No respiratory distress.   Musculoskeletal:         General: Tenderness present.        Legs:       Comments: LEft knee   Neurological:      Mental Status: She is alert and oriented to person, place, and time.   Psychiatric:         Mood and Affect: Mood normal.         Behavior: Behavior normal.         Thought Content: Thought content normal.         Judgment: Judgment normal.       Laboratory Reviewed ({Yes)  Lab Results   Component Value  Date    WBC 8.76 12/14/2022    HGB 11.8 (L) 12/14/2022    HCT 37.3 12/14/2022     12/14/2022    CHOL 163 01/06/2022    TRIG 71 01/06/2022    HDL 51 01/06/2022    ALT 23 12/14/2022    AST 24 12/14/2022     12/14/2022    K 3.8 12/14/2022     12/14/2022    CREATININE 0.8 12/14/2022    BUN 18 12/14/2022    CO2 26 12/14/2022    TSH 1.869 04/19/2013    INR 1.0 01/28/2019       Casie was seen today for follow-up.    Diagnoses and all orders for this visit:    Primary hypertension  -     Comprehensive Metabolic Panel; Future  -     Hemoglobin A1C; Future    Peritoneal carcinomatosis  GYN ONC followup    Pulmonary HTN  Seen on ECHO  BP in goal range    S/P BSO (bilateral salpingo-oophorectomy)  Followed by GYn ONC.  Last Ca 125 negative    Screening mammogram, encounter for  -     Mammo Digital Screening Bilat w/ Raad; Future    Abnormal finding of blood chemistry, unspecified  -     Hemoglobin A1C; Future  CHeck Glucose/A1c    She had CT in Sept of 2022.  Impression:     13 mm nodule seen along the left iliac chain not definitely seen on prior exam. Recommend continued surveillance imaging and consideration for PET scan.     10 mm hypoenhancing lesion within the head/uncinate of the pancreas. Recommend follow-up MRI or endoscopic ultrasound.    We will coordinate with GYN ONC to see what scan is needed next for surveillance. Clinical doing okay.    Knee-  Xray and Ortho.    Care Plan/Goals: Reviewed    Goals          Patient Stated      Blood Pressure < 130/80 (pt-stated)        Other      Exercise at least 150 minutes per week.       Maintain a low sodium diet       American Heart Association (AHA) guidelines recommend less than 1500 mg of dietary salt per day.        Take at least one BP reading per week at various times of the day             Follow up: Follow up in about 6 months (around 12/12/2023).    After visit summary was printed and given to patient upon discharge today.  Patient goals and care  plan are included in After Visit Summary.

## 2023-06-12 NOTE — TELEPHONE ENCOUNTER
Spoke with our patient about her  schedule appointment she voiced understanding of the date, time and location. All questions answered appointment mail. Provider Scheduling Coord.  Gynecologic Oncology MA/PAR /Preceptor Tomasz Purcell

## 2023-06-12 NOTE — Clinical Note
She is overdue to see you for follow up. Can you schedule her for GYN ONC? She had a scan in Sept, with some incidental findings. Would you like a repeat scan/PET or MRI?   Hope- can you see why Angelica sent her a letter denying visit with Cathy? Said she had to have a referral?

## 2023-06-16 ENCOUNTER — PATIENT MESSAGE (OUTPATIENT)
Dept: INTERNAL MEDICINE | Facility: CLINIC | Age: 68
End: 2023-06-16
Payer: MEDICARE

## 2023-06-19 ENCOUNTER — HOSPITAL ENCOUNTER (OUTPATIENT)
Dept: RADIOLOGY | Facility: HOSPITAL | Age: 68
Discharge: HOME | End: 2023-06-19
Attending: ORTHOPAEDIC SURGERY
Payer: MEDICARE

## 2023-06-19 ENCOUNTER — OFFICE VISIT (OUTPATIENT)
Dept: ORTHOPEDICS | Facility: CLINIC | Age: 68
End: 2023-06-19
Payer: MEDICARE

## 2023-06-19 VITALS — BODY MASS INDEX: 39.57 KG/M2 | WEIGHT: 252.13 LBS | HEIGHT: 67 IN

## 2023-06-19 DIAGNOSIS — M23.312 INTERNAL DERANGEMENT OF LEFT KNEE INVOLVING ANTERIOR HORN OF MEDIAL MENISCUS: Primary | ICD-10-CM

## 2023-06-19 DIAGNOSIS — M23.312 INTERNAL DERANGEMENT OF LEFT KNEE INVOLVING ANTERIOR HORN OF MEDIAL MENISCUS: ICD-10-CM

## 2023-06-19 DIAGNOSIS — M94.262 CHONDROMALACIA, LEFT KNEE: ICD-10-CM

## 2023-06-19 DIAGNOSIS — M54.6 PAIN IN THORACIC SPINE: Primary | ICD-10-CM

## 2023-06-19 DIAGNOSIS — M70.62 GREATER TROCHANTERIC BURSITIS OF BOTH HIPS: ICD-10-CM

## 2023-06-19 DIAGNOSIS — M54.6 PAIN IN THORACIC SPINE: ICD-10-CM

## 2023-06-19 DIAGNOSIS — E66.9 OBESITY (BMI 35.0-39.9 WITHOUT COMORBIDITY): ICD-10-CM

## 2023-06-19 DIAGNOSIS — G89.29 CHRONIC PAIN OF LEFT KNEE: ICD-10-CM

## 2023-06-19 DIAGNOSIS — M25.562 CHRONIC PAIN OF LEFT KNEE: ICD-10-CM

## 2023-06-19 DIAGNOSIS — M70.61 GREATER TROCHANTERIC BURSITIS OF BOTH HIPS: ICD-10-CM

## 2023-06-19 DIAGNOSIS — M51.34 DDD (DEGENERATIVE DISC DISEASE), THORACIC: ICD-10-CM

## 2023-06-19 PROCEDURE — 72080 X-RAY EXAM THORACOLMB 2/> VW: CPT | Mod: 26,,, | Performed by: RADIOLOGY

## 2023-06-19 PROCEDURE — 3288F FALL RISK ASSESSMENT DOCD: CPT | Mod: CPTII,S$GLB,, | Performed by: ORTHOPAEDIC SURGERY

## 2023-06-19 PROCEDURE — 3008F BODY MASS INDEX DOCD: CPT | Mod: CPTII,S$GLB,, | Performed by: ORTHOPAEDIC SURGERY

## 2023-06-19 PROCEDURE — 99999 PR PBB SHADOW E&M-EST. PATIENT-LVL IV: CPT | Mod: PBBFAC,,, | Performed by: ORTHOPAEDIC SURGERY

## 2023-06-19 PROCEDURE — 1125F AMNT PAIN NOTED PAIN PRSNT: CPT | Mod: CPTII,S$GLB,, | Performed by: ORTHOPAEDIC SURGERY

## 2023-06-19 PROCEDURE — 1159F MED LIST DOCD IN RCRD: CPT | Mod: CPTII,S$GLB,, | Performed by: ORTHOPAEDIC SURGERY

## 2023-06-19 PROCEDURE — 3008F PR BODY MASS INDEX (BMI) DOCUMENTED: ICD-10-PCS | Mod: CPTII,S$GLB,, | Performed by: ORTHOPAEDIC SURGERY

## 2023-06-19 PROCEDURE — 3044F PR MOST RECENT HEMOGLOBIN A1C LEVEL <7.0%: ICD-10-PCS | Mod: CPTII,S$GLB,, | Performed by: ORTHOPAEDIC SURGERY

## 2023-06-19 PROCEDURE — 72080 X-RAY EXAM THORACOLMB 2/> VW: CPT | Mod: TC

## 2023-06-19 PROCEDURE — 99214 OFFICE O/P EST MOD 30 MIN: CPT | Mod: 25,S$GLB,, | Performed by: ORTHOPAEDIC SURGERY

## 2023-06-19 PROCEDURE — 99214 PR OFFICE/OUTPT VISIT, EST, LEVL IV, 30-39 MIN: ICD-10-PCS | Mod: 25,S$GLB,, | Performed by: ORTHOPAEDIC SURGERY

## 2023-06-19 PROCEDURE — 20610 DRAIN/INJ JOINT/BURSA W/O US: CPT | Mod: LT,S$GLB,, | Performed by: ORTHOPAEDIC SURGERY

## 2023-06-19 PROCEDURE — 1159F PR MEDICATION LIST DOCUMENTED IN MEDICAL RECORD: ICD-10-PCS | Mod: CPTII,S$GLB,, | Performed by: ORTHOPAEDIC SURGERY

## 2023-06-19 PROCEDURE — 1101F PR PT FALLS ASSESS DOC 0-1 FALLS W/OUT INJ PAST YR: ICD-10-PCS | Mod: CPTII,S$GLB,, | Performed by: ORTHOPAEDIC SURGERY

## 2023-06-19 PROCEDURE — 3044F HG A1C LEVEL LT 7.0%: CPT | Mod: CPTII,S$GLB,, | Performed by: ORTHOPAEDIC SURGERY

## 2023-06-19 PROCEDURE — 72080 XR THORACOLUMBAR SPINE AP LATERAL: ICD-10-PCS | Mod: 26,,, | Performed by: RADIOLOGY

## 2023-06-19 PROCEDURE — 99999 PR PBB SHADOW E&M-EST. PATIENT-LVL IV: ICD-10-PCS | Mod: PBBFAC,,, | Performed by: ORTHOPAEDIC SURGERY

## 2023-06-19 PROCEDURE — 3288F PR FALLS RISK ASSESSMENT DOCUMENTED: ICD-10-PCS | Mod: CPTII,S$GLB,, | Performed by: ORTHOPAEDIC SURGERY

## 2023-06-19 PROCEDURE — 20610 LARGE JOINT ASPIRATION/INJECTION: L KNEE: ICD-10-PCS | Mod: LT,S$GLB,, | Performed by: ORTHOPAEDIC SURGERY

## 2023-06-19 PROCEDURE — 1101F PT FALLS ASSESS-DOCD LE1/YR: CPT | Mod: CPTII,S$GLB,, | Performed by: ORTHOPAEDIC SURGERY

## 2023-06-19 PROCEDURE — 1125F PR PAIN SEVERITY QUANTIFIED, PAIN PRESENT: ICD-10-PCS | Mod: CPTII,S$GLB,, | Performed by: ORTHOPAEDIC SURGERY

## 2023-06-19 RX ORDER — METHYLPREDNISOLONE ACETATE 80 MG/ML
80 INJECTION, SUSPENSION INTRA-ARTICULAR; INTRALESIONAL; INTRAMUSCULAR; SOFT TISSUE
Status: DISCONTINUED | OUTPATIENT
Start: 2023-06-19 | End: 2023-06-19 | Stop reason: HOSPADM

## 2023-06-19 RX ADMIN — METHYLPREDNISOLONE ACETATE 80 MG: 80 INJECTION, SUSPENSION INTRA-ARTICULAR; INTRALESIONAL; INTRAMUSCULAR; SOFT TISSUE at 03:06

## 2023-06-19 NOTE — PROCEDURES
Large Joint Aspiration/Injection: L knee    Date/Time: 6/19/2023 3:00 PM  Performed by: Paul Esparza MD  Authorized by: Paul Esparza MD     Consent Done?:  Yes (Verbal)  Indications:  Pain  Site marked: the procedure site was marked    Timeout: prior to procedure the correct patient, procedure, and site was verified      Local anesthesia used?: Yes    Local anesthetic:  Lidocaine 1% without epinephrine    Details:  Needle Size:  22 G  Ultrasonic Guidance for needle placement?: No    Approach:  Anterolateral  Location:  Knee  Site:  L knee  Medications:  80 mg methylPREDNISolone acetate 80 mg/mL  Patient tolerance:  Patient tolerated the procedure well with no immediate complications

## 2023-06-19 NOTE — PROGRESS NOTES
Subjective:     Patient ID: Casie Gaines is a 67 y.o. female.    Chief Complaint: Pain of the Left Knee    HPI:  06/19/2023   Low back pain  Neck pain   Left knee pain  Hip pains  Patient 67-year-old who has been having left knee pain for 2 years.  Used to occasionally catch and lock and now is doing it constantly.  She is afraid that she might fall.  There is catching locking feeling giving way.  She does have some issues with in had it it cervical spine arthritis and she is going to physical therapy for her neck and lower part of her back.  She takes only occasional Tylenol Arthritis.  She used to take NSAIDs but she was told to stop taking it.  She does have history of ovarian cancer and was treated for that.  She said she does have back pain.  She does have hip pain sometimes she can not sleep on the hips she has to turn over to the other side she complains of the left side being worse than the right.  Pain is 3/10 but occasionally goes up to 6/10.  No loss of bowel bladder control.  No fever no chills no shortness of breath no difficulty with chewing or swallowing or blurry vision double vision loss sense smell or taste    She stated when she is about to sit down she flaps down it happens also when she is trying to sit at the toilet and she needs to use her arms to get up.  This is proximal pelvis muscle weakness.    I did go over CT scan done of her abdomen 2020 and there is no comment on the bony structures however you can see severe degenerative radiation from T8 down to T11 a question at T8 or T9 level might be compression on the spinal cord    Past Medical History:   Diagnosis Date    Anemia     Anxiety     Arthritis     knees    Cancer     ovarian    CHF (congestive heart failure)     Hx antineoplastic chemotherapy     last 6/2019    Hyperlipemia     Hypertension     Neck pain     Ovarian cancer 2019    CHEMO    Peritoneal carcinomatosis 1/26/2019     Past Surgical History:   Procedure Laterality  Date    breast reduction  10/02/2018    CATARACT EXTRACTION Bilateral     2023     SECTION      X 1    COLONOSCOPY N/A 2021    Procedure: COLONOSCOPY;  Surgeon: Paulette Rojas MD;  Location: [facility] ENDO;  Service: Gastroenterology;  Laterality: N/A;    CYSTOSCOPY W/ URETERAL STENT PLACEMENT Right 2019    Procedure: CYSTOSCOPY, WITH URETERAL STENT INSERTION;  Surgeon: Timmy Santiago IV, MD;  Location: [facility] OR;  Service: Urology;  Laterality: Right;    CYSTOSCOPY W/ URETERAL STENT REMOVAL  10/04/2019    DILATION AND CURETTAGE OF UTERUS      HYSTERECTOMY      RALH for fibroids (still has ovaries)    INSERTION OF VENOUS ACCESS PORT Left 2019    Procedure: INSERTION, VENOUS ACCESS PORT;  Surgeon: Ulisses Monzon MD;  Location: [facility] OR;  Service: General;  Laterality: Left;  Left internal jugular     LYSIS OF ADHESIONS OF URETER N/A 08/15/2019    Procedure: URETEROLYSIS;  Surgeon: Ismael Juarez MD;  Location: [facility] OR;  Service: OB/GYN;  Laterality: N/A;    OMENTECTOMY N/A 08/15/2019    Procedure: OMENTECTOMY;  Surgeon: Ismael Juarez MD;  Location: [facility] OR;  Service: OB/GYN;  Laterality: N/A;    RETROGRADE PYELOGRAPHY Right 2019    Procedure: PYELOGRAM, RETROGRADE;  Surgeon: Timmy Santiago IV, MD;  Location: [facility] OR;  Service: Urology;  Laterality: Right;    ROBOT-ASSISTED LAPAROSCOPIC SALPINGO-OOPHORECTOMY USING DA TIA XI Bilateral 08/15/2019    Procedure: XI ROBOTIC SALPINGO-OOPHORECTOMY;  Surgeon: Ismael Juarez MD;  Location: [facility];  Service: OB/GYN;  Laterality: Bilateral;    TOTAL REDUCTION MAMMOPLASTY  2018    TUBAL LIGATION       Family History   Problem Relation Age of Onset    Glaucoma Mother     Colon cancer Brother     Breast cancer Maternal Aunt     Breast cancer Paternal Aunt     Ovarian cancer Paternal Aunt     Anesthesia problems Other     Thrombophilia Neg Hx      Social History     Socioeconomic History    Marital status:    Tobacco Use     Smoking status: Former     Packs/day: 1.00     Years: 25.00     Pack years: 25.00     Types: Cigarettes     Quit date: 10/1/2018     Years since quittin.7    Smokeless tobacco: Never    Tobacco comments:     States started quit 2 months ago after 30 years   Substance and Sexual Activity    Alcohol use: Yes     Comment: occasionally  No alcohol 72h prior to sx    Drug use: No    Sexual activity: Not Currently     Partners: Male     Comment: hyst; mut monog   Social History Narrative    Live w/ spouse and 2 dogs      Medication List with Changes/Refills   Current Medications    ALBUTEROL 90 MCG/ACTUATION INHALER    Inhale 2 puffs into the lungs every 4 (four) hours as needed for Wheezing. Rescue    ALPRAZOLAM (XANAX) 0.25 MG TABLET    Take 1 tablet (0.25 mg total) by mouth 3 (three) times daily as needed for Anxiety.    AMLODIPINE (NORVASC) 5 MG TABLET    Take 2 tablets (10 mg total) by mouth once daily.    ATENOLOL (TENORMIN) 25 MG TABLET    Take 1 tablet (25 mg total) by mouth once daily.    AZELASTINE (ASTELIN) 137 MCG (0.1 %) NASAL SPRAY    1 spray (137 mcg total) by Nasal route 2 (two) times daily.    BIOTIN 1 MG TABLET    Take 1,000 mcg by mouth once daily.     CETIRIZINE (ZYRTEC) 10 MG TABLET    Take 1 tablet (10 mg total) by mouth once daily.    DICLOFENAC (VOLTAREN) 75 MG EC TABLET    Take 1 tablet (75 mg total) by mouth 2 (two) times daily.    DICLOFENAC SODIUM (VOLTAREN) 1 % GEL    Apply once a day to back as needed    EPINEPHRINE (EPIPEN) 0.3 MG/0.3 ML ATIN    Inject 0.3 mLs (0.3 mg total) into the muscle once. for 1 dose    FLUTICASONE PROPIONATE (FLONASE) 50 MCG/ACTUATION NASAL SPRAY    1 spray (50 mcg total) by Each Nostril route every 12 (twelve) hours as needed for Rhinitis.    GABAPENTIN (NEURONTIN) 100 MG CAPSULE    Take 1 capsule (100 mg total) by mouth 3 (three) times daily.    GINKGO BILOBA 40 MG TAB    Take 40 mg by mouth.    HYDROXYZINE HCL (ATARAX) 25 MG TABLET    Take 1 tablet (25 mg  total) by mouth 3 (three) times daily as needed for Itching.    MULTIVITAMIN WITH MINERALS TABLET    Take 1 tablet by mouth once daily.     OLMESARTAN-HYDROCHLOROTHIAZIDE (BENICAR HCT) 40-25 MG PER TABLET    Take 1 tablet by mouth once daily.    ROSUVASTATIN (CRESTOR) 10 MG TABLET    Take 1 tablet (10 mg total) by mouth once daily.    SERTRALINE (ZOLOFT) 25 MG TABLET    Take 1 tablet (25 mg total) by mouth once daily.    SUMATRIPTAN (IMITREX) 50 MG TABLET    Take 1 tablet (50 mg total) by mouth every 4 to 6 hours as needed for Migraine.    ZINC GLUCONATE 50 MG TABLET    Take 50 mg by mouth once daily.     Review of patient's allergies indicates:  No Known Allergies  Review of Systems   Constitutional: Negative for decreased appetite.   HENT:  Negative for tinnitus.    Eyes:  Negative for double vision.   Cardiovascular:  Negative for chest pain.   Respiratory:  Negative for wheezing.    Hematologic/Lymphatic: Negative for bleeding problem.   Skin:  Negative for dry skin.   Musculoskeletal:  Positive for arthritis, back pain, joint pain, muscle weakness and neck pain. Negative for gout and stiffness.   Gastrointestinal:  Negative for abdominal pain.   Genitourinary:  Negative for bladder incontinence.   Neurological:  Negative for numbness, paresthesias and sensory change.   Psychiatric/Behavioral:  Negative for altered mental status.      Objective:   Body mass index is 39.48 kg/m².  There were no vitals filed for this visit.       General    Constitutional: She is oriented to person, place, and time. She appears well-developed.   HENT:   Head: Atraumatic.   Eyes: EOM are normal.   Pulmonary/Chest: Effort normal.   Neurological: She is alert and oriented to person, place, and time.   Psychiatric: Judgment normal.         Ambulating without assistive devices slightly hunched over  She uses the upper extremities to push to get up from sitting position  She does have some tenderness around the right L5-S1 level and  also with palpation around T7-T8 level.  I did not see any deformity.    Pelvis is level   Hip flexors and abductors and adductors and quads and hamstrings and ankle extensors and flexors are slightly weak at 5-/5  Passive hip range of motion is without pain groin   Severe pain to palpation over the left greater trochanter radiating down half way lateral thigh and on the right side moderate pain to palpation without radiation down the thigh  Left knee range of motion 0-125 degrees.  Severe pain to palpation medial joint line.  Varus stressing is painful on the medial side.  Collaterals and cruciates are stable.  Negative anterior drawer.  No defect in the patella or quadriceps tendon.  There is very mild swelling  The right knee range of motion 0-135 degrees.  Very mild crepitus to compression.  No pain on the medial joint line.  Collaterals and cruciates are stable.  No defect in the patella or quadriceps tendon  Calves are soft nontender   Able to move her ankles up and down   Skin is warm to touch   Capillary refill less than 2 seconds    Relevant imaging results reviewed and interpreted by me, discussed with the patient and / or family today     X-ray 06/12/2023 left knee with very mild if any joint space narrowing medially with very small marginal osteophyte.  There is osteopenic bone.  There is no fracture seen.  Right knee similar findings.  MRI of the abdomen in 2020 reviewed showing severe degenerative changes and arthropathy affecting the T8, T9, T10, T11.  The hips look okay  Assessment:     Encounter Diagnoses   Name Primary?    Chondromalacia, left knee     Chronic pain of left knee     Obesity (BMI 35.0-39.9 without comorbidity)     Greater trochanteric bursitis of both hips     Internal derangement of left knee involving anterior horn of medial meniscus Yes    DDD (degenerative disc disease), thoracic         Plan:   Internal derangement of left knee involving anterior horn of medial  meniscus    Chondromalacia, left knee  -     Large Joint Aspiration/Injection: L knee    Chronic pain of left knee  -     Ambulatory referral/consult to Orthopedics    Obesity (BMI 35.0-39.9 without comorbidity)    Greater trochanteric bursitis of both hips    DDD (degenerative disc disease), thoracic  -     Large Joint Aspiration/Injection: L knee         Patient Instructions   You having back pain and middle upper back as well as neck issues from before  Your CT scan from 2020 on your abdomen checking your cancer showed we that you have severe arthritis from T8 down to T11 possibility of spinal stenosis around T8 level  You having weakness to when you try to sit down You flopped down  You having left knee pain and your x-ray with minimal arthritic changes if any however your pain is mostly localized on the inside.  Now it is becoming quite often that you catch lock gives way on you  You tried only Tylenol Arthritis  You could not take any anti-inflammatories like Aleve or Advil or Motrin over-the-counter because of her cancer  Plan   Will start with the injection of 80 mg Depo-Medrol mixed with 5 cc 1% lidocaine into the left knee  We need to get x-rays of her thoracic spine  Also we need to get MRI of her left knee as well as the T-spine  I would like you to get started with some physical therapy to strengthen you legs as well as her back muscles.  You already going to [facility] on [address] for your back we will add the lower extremities  I can not give you a leave or Advil or anything like that at this time  You can take Tylenol Arthritis maximum 3 times a day if needed  After the injection you might have some burning pain the next few days in the knee you must ice it will go away  I will see you after you get does MRIs        Disclaimer: This note was prepared using a voice recognition system and is likely to have sound alike errors within the text.

## 2023-06-19 NOTE — PATIENT INSTRUCTIONS
You having back pain and middle upper back as well as neck issues from before  Your CT scan from 2020 on your abdomen checking your cancer showed we that you have severe arthritis from T8 down to T11 possibility of spinal stenosis around T8 level  You having weakness to when you try to sit down You flopped down  You having left knee pain and your x-ray with minimal arthritic changes if any however your pain is mostly localized on the inside.  Now it is becoming quite often that you catch lock gives way on you  You tried only Tylenol Arthritis  You could not take any anti-inflammatories like Aleve or Advil or Motrin over-the-counter because of her cancer  Plan   Will start with the injection of 80 mg Depo-Medrol mixed with 5 cc 1% lidocaine into the left knee  We need to get x-rays of her thoracic spine  Also we need to get MRI of her left knee as well as the T-spine  I would like you to get started with some physical therapy to strengthen you legs as well as her back muscles.  You already going to [facility] on [address] for your back we will add the lower extremities  I can not give you aleve or Advil or anything like that at this time  You can take Tylenol Arthritis maximum 3 times a day if needed  After the injection you might have some burning pain the next few days in the knee you must ice it will go away  I will see you after you get the MRIs

## 2023-06-23 ENCOUNTER — OFFICE VISIT (OUTPATIENT)
Dept: GYNECOLOGIC ONCOLOGY | Facility: CLINIC | Age: 68
End: 2023-06-23
Payer: MEDICARE

## 2023-06-23 VITALS
DIASTOLIC BLOOD PRESSURE: 61 MMHG | BODY MASS INDEX: 39.31 KG/M2 | WEIGHT: 251 LBS | HEART RATE: 71 BPM | SYSTOLIC BLOOD PRESSURE: 128 MMHG

## 2023-06-23 DIAGNOSIS — C56.3 OVARIAN CANCER, BILATERAL: Primary | ICD-10-CM

## 2023-06-23 PROCEDURE — 3078F DIAST BP <80 MM HG: CPT | Mod: CPTII,S$GLB,, | Performed by: OBSTETRICS & GYNECOLOGY

## 2023-06-23 PROCEDURE — 3008F PR BODY MASS INDEX (BMI) DOCUMENTED: ICD-10-PCS | Mod: CPTII,S$GLB,, | Performed by: OBSTETRICS & GYNECOLOGY

## 2023-06-23 PROCEDURE — 1159F MED LIST DOCD IN RCRD: CPT | Mod: CPTII,S$GLB,, | Performed by: OBSTETRICS & GYNECOLOGY

## 2023-06-23 PROCEDURE — 3078F PR MOST RECENT DIASTOLIC BLOOD PRESSURE < 80 MM HG: ICD-10-PCS | Mod: CPTII,S$GLB,, | Performed by: OBSTETRICS & GYNECOLOGY

## 2023-06-23 PROCEDURE — 99999 PR PBB SHADOW E&M-EST. PATIENT-LVL III: ICD-10-PCS | Mod: PBBFAC,,, | Performed by: OBSTETRICS & GYNECOLOGY

## 2023-06-23 PROCEDURE — 1101F PR PT FALLS ASSESS DOC 0-1 FALLS W/OUT INJ PAST YR: ICD-10-PCS | Mod: CPTII,S$GLB,, | Performed by: OBSTETRICS & GYNECOLOGY

## 2023-06-23 PROCEDURE — 1160F PR REVIEW ALL MEDS BY PRESCRIBER/CLIN PHARMACIST DOCUMENTED: ICD-10-PCS | Mod: CPTII,S$GLB,, | Performed by: OBSTETRICS & GYNECOLOGY

## 2023-06-23 PROCEDURE — 1159F PR MEDICATION LIST DOCUMENTED IN MEDICAL RECORD: ICD-10-PCS | Mod: CPTII,S$GLB,, | Performed by: OBSTETRICS & GYNECOLOGY

## 2023-06-23 PROCEDURE — 1160F RVW MEDS BY RX/DR IN RCRD: CPT | Mod: CPTII,S$GLB,, | Performed by: OBSTETRICS & GYNECOLOGY

## 2023-06-23 PROCEDURE — 1126F AMNT PAIN NOTED NONE PRSNT: CPT | Mod: CPTII,S$GLB,, | Performed by: OBSTETRICS & GYNECOLOGY

## 2023-06-23 PROCEDURE — 99214 PR OFFICE/OUTPT VISIT, EST, LEVL IV, 30-39 MIN: ICD-10-PCS | Mod: S$GLB,,, | Performed by: OBSTETRICS & GYNECOLOGY

## 2023-06-23 PROCEDURE — 3044F HG A1C LEVEL LT 7.0%: CPT | Mod: CPTII,S$GLB,, | Performed by: OBSTETRICS & GYNECOLOGY

## 2023-06-23 PROCEDURE — 3044F PR MOST RECENT HEMOGLOBIN A1C LEVEL <7.0%: ICD-10-PCS | Mod: CPTII,S$GLB,, | Performed by: OBSTETRICS & GYNECOLOGY

## 2023-06-23 PROCEDURE — 99999 PR PBB SHADOW E&M-EST. PATIENT-LVL III: CPT | Mod: PBBFAC,,, | Performed by: OBSTETRICS & GYNECOLOGY

## 2023-06-23 PROCEDURE — 3288F PR FALLS RISK ASSESSMENT DOCUMENTED: ICD-10-PCS | Mod: CPTII,S$GLB,, | Performed by: OBSTETRICS & GYNECOLOGY

## 2023-06-23 PROCEDURE — 3008F BODY MASS INDEX DOCD: CPT | Mod: CPTII,S$GLB,, | Performed by: OBSTETRICS & GYNECOLOGY

## 2023-06-23 PROCEDURE — 3074F PR MOST RECENT SYSTOLIC BLOOD PRESSURE < 130 MM HG: ICD-10-PCS | Mod: CPTII,S$GLB,, | Performed by: OBSTETRICS & GYNECOLOGY

## 2023-06-23 PROCEDURE — 3288F FALL RISK ASSESSMENT DOCD: CPT | Mod: CPTII,S$GLB,, | Performed by: OBSTETRICS & GYNECOLOGY

## 2023-06-23 PROCEDURE — 1126F PR PAIN SEVERITY QUANTIFIED, NO PAIN PRESENT: ICD-10-PCS | Mod: CPTII,S$GLB,, | Performed by: OBSTETRICS & GYNECOLOGY

## 2023-06-23 PROCEDURE — 3074F SYST BP LT 130 MM HG: CPT | Mod: CPTII,S$GLB,, | Performed by: OBSTETRICS & GYNECOLOGY

## 2023-06-23 PROCEDURE — 1101F PT FALLS ASSESS-DOCD LE1/YR: CPT | Mod: CPTII,S$GLB,, | Performed by: OBSTETRICS & GYNECOLOGY

## 2023-06-23 PROCEDURE — 99214 OFFICE O/P EST MOD 30 MIN: CPT | Mod: S$GLB,,, | Performed by: OBSTETRICS & GYNECOLOGY

## 2023-06-23 NOTE — PROGRESS NOTES
Subjective:      Patient ID: Casie Gaines is a 67 y.o. female.    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   Here today for continued surveillance.  CA-125 last visit was stable at 7 but increased to 21 on lab draw a couple days ago. Remainder of labs ok.  No new symptoms.  Review of Systems   Constitutional:  Negative for activity change, appetite change, chills, fatigue and fever.   HENT:  Negative for hearing loss, mouth sores, nosebleeds, sore throat and tinnitus.    Eyes:  Negative for visual disturbance.   Respiratory:  Negative for cough, chest tightness, shortness of breath and wheezing.    Cardiovascular:  Negative for chest pain and leg swelling.   Gastrointestinal:  Negative for abdominal distention, abdominal pain, blood in stool, constipation, diarrhea, nausea and vomiting.   Genitourinary:  Negative for dysuria, flank pain, frequency, hematuria, pelvic pain, vaginal bleeding, vaginal discharge and vaginal pain.   Musculoskeletal:  Negative for arthralgias and back pain.   Skin:  Negative for rash.   Neurological:  Negative for dizziness, seizures, syncope, weakness and numbness.   Hematological:  Does not bruise/bleed easily.   Psychiatric/Behavioral:  Negative for confusion and sleep disturbance. The patient is not nervous/anxious.      Objective:   Physical Exam:   Constitutional: She appears well-developed and  well-nourished. No distress.    HENT:   Head: Normocephalic and atraumatic.    Eyes: No scleral icterus.     Cardiovascular:  Normal rate and intact distal pulses.      Exam reveals no cyanosis and no edema.        Pulmonary/Chest: Effort normal. No respiratory distress. She exhibits no tenderness.        Abdominal: Soft. She exhibits no distension (incisions healing well), no fluid wave and no mass. There is no abdominal tenderness. There is no rebound and no guarding. No hernia.     Genitourinary:    Vagina and rectum normal.      Pelvic exam was performed with patient supine.   Labial bartholins normal.There is no rash, tenderness or lesion on the right labia. There is no rash, tenderness or lesion on the left labia. Right adnexum displays no mass, no tenderness and no fullness. Left adnexum displays no mass, no tenderness and no fullness. Vaginal cuff normal.  No  no vaginal discharge, bleeding or unspecified prolapse of vaginal walls in the vagina. Cervix is absent.Uterus is absent.              Lymphadenopathy:     She has no cervical adenopathy. No inguinal adenopathy noted on the right or left side.     Skin: No cyanosis.      Assessment:     1. Ovarian cancer, bilateral        Plan:       Patient is doing well and is CLYDE on exam. Repeat CA-125 in 1 month.  If increased will scan.  If stable or decreased, RTC 6 months with CA-125

## 2023-07-06 ENCOUNTER — HOSPITAL ENCOUNTER (OUTPATIENT)
Dept: RADIOLOGY | Facility: HOSPITAL | Age: 68
Discharge: HOME | End: 2023-07-06
Attending: ORTHOPAEDIC SURGERY
Payer: MEDICARE

## 2023-07-06 DIAGNOSIS — M23.312 INTERNAL DERANGEMENT OF LEFT KNEE INVOLVING ANTERIOR HORN OF MEDIAL MENISCUS: ICD-10-CM

## 2023-07-06 DIAGNOSIS — M54.6 PAIN IN THORACIC SPINE: ICD-10-CM

## 2023-07-06 PROCEDURE — 72146 MRI THORACIC SPINE WITHOUT CONTRAST: ICD-10-PCS | Mod: 26,,, | Performed by: STUDENT IN AN ORGANIZED HEALTH CARE EDUCATION/TRAINING PROGRAM

## 2023-07-06 PROCEDURE — 72146 MRI CHEST SPINE W/O DYE: CPT | Mod: TC

## 2023-07-06 PROCEDURE — 72146 MRI CHEST SPINE W/O DYE: CPT | Mod: 26,,, | Performed by: STUDENT IN AN ORGANIZED HEALTH CARE EDUCATION/TRAINING PROGRAM

## 2023-07-06 PROCEDURE — 73721 MRI KNEE WITHOUT CONTRAST LEFT: ICD-10-PCS | Mod: 26,LT,, | Performed by: STUDENT IN AN ORGANIZED HEALTH CARE EDUCATION/TRAINING PROGRAM

## 2023-07-06 PROCEDURE — 73721 MRI JNT OF LWR EXTRE W/O DYE: CPT | Mod: 26,LT,, | Performed by: STUDENT IN AN ORGANIZED HEALTH CARE EDUCATION/TRAINING PROGRAM

## 2023-07-06 PROCEDURE — 73721 MRI JNT OF LWR EXTRE W/O DYE: CPT | Mod: TC,LT

## 2023-07-10 ENCOUNTER — CLINICAL SUPPORT (OUTPATIENT)
Dept: REHABILITATION | Facility: HOSPITAL | Age: 68
End: 2023-07-10
Payer: MEDICARE

## 2023-07-10 DIAGNOSIS — M54.30 SCIATIC NERVE PAIN, UNSPECIFIED LATERALITY: Primary | ICD-10-CM

## 2023-07-10 PROCEDURE — 97110 THERAPEUTIC EXERCISES: CPT | Mod: PN

## 2023-07-10 PROCEDURE — 97112 NEUROMUSCULAR REEDUCATION: CPT | Mod: PN

## 2023-07-10 NOTE — PROGRESS NOTES
[facility]   Physical Therapy Treatment and Progress Note      Name: Casie Frias [place]  Clinic Number: [id number]    Therapy Diagnosis:   No diagnosis found.    Physician: Cathy Echeverria NP    Visit Date: 7/10/2023    Physician Orders: PT Eval and Treat   Medical Diagnosis from Referral: Sciatic Nerve pain  Evaluation Date: 5/22/2023  Authorization Period Expiration: 05/23/2024  Plan of Care Expiration: 07/21/2023  Progress Note Due: 07/21/2023  Visit # / Visits authorized: 1/1; 4/20  FOTO: 1/3      Precautions: cancer    PTA Visit #: 0/5     Time In: 1102  Time Out: 1150  Total Billable Time: 48 minutes    Subjective     Pt reports: she is stiff   She was not compliant with home exercise program.  Response to previous treatment: N/A  Functional change: N/A    Pain: 4/10  Location: bilateral arms     Objective      Objective Measures updated at progress report unless specified.     Treatment     Casie received the treatments listed below:      therapeutic exercises to develop strength, posture, and core stabilization for 13 minutes including:  NuStep 5 min for repetitive hip motion and postural cues  Seated ball roll [id number]  Prone hip extension 2x10  Prone quad stretch 3x20 seconds  Clamshells 2x10    neuromuscular re-education activities to improve: Coordination, Kinesthetic, Proprioception, and Posture for 25 minutes. The following activities were included:  Matrix lumbar extension 40# x20  Matrix rotation 26# x20  Posterior pelvic tilt with cues  Supine DKTC with ball 2x15   Lower trunk rotation 2x15  Therapeutic activities to improve functional performance for 00 minutes, including:  [place] carry 15#  Dead lifting from raised surface 20# 2x10  Pushing sled    Patient Education and Home Exercises       Education provided:   - Home program review     Written Home Exercises Provided: Patient instructed to cont prior HEP. Exercises were reviewed and Casie was able to demonstrate  them prior to the end of the session.  Casie demonstrated good  understanding of the education provided. See EMR under Patient Instructions for exercises provided during therapy sessions    Assessment     The patient has returned to physical therapy with stiffness and pain.  She has not been compliant with PT HEP.  She has low back and knee pain with reports of new wrist pain    Casie Is progressing well towards her goals.   Pt prognosis is Good.     Pt will continue to benefit from skilled outpatient physical therapy to address the deficits listed in the problem list box on initial evaluation, provide pt/family education and to maximize pt's level of independence in the home and community environment.     Pt's spiritual, cultural and educational needs considered and pt agreeable to plan of care and goals.     Anticipated barriers to physical therapy: None    Goals:   Short Term Goals: In 4 weeks   1. I with HEP  2. Pt to increase lumbar ROM from mid shin to proximal ankle    3. Pt to have pain less than 6/10 at all times. (Goal met 6/10/2023)     Long Term Goals: In 8 weeks  1.  Pt will improve FOTO disability score to 40% disability or less in order to improve overall QOL and return to PLOF.    2.  Patient to demo increase in LE strength to 5/5  3.  Patient to have decreased pain to 3/10 at all times.  4.  Patient to demo increase lumbar ROM to 100%  5.  Patient to perform daily activities including walking without limitation.    Plan     Plan of care Certification: 5/22/2023 to 07/21/2023.     Outpatient Physical Therapy 2 times weekly for 8 weeks to include the following interventions: Electrical Stimulation, Manual Therapy, Moist Heat/ Ice, Neuromuscular Re-ed, Patient Education, Therapeutic Activities, Therapeutic Exercise, and Dry Needling.        Andres Martines, PT

## 2023-07-13 ENCOUNTER — CLINICAL SUPPORT (OUTPATIENT)
Dept: REHABILITATION | Facility: HOSPITAL | Age: 68
End: 2023-07-13
Payer: MEDICARE

## 2023-07-13 DIAGNOSIS — M54.30 SCIATIC NERVE PAIN, UNSPECIFIED LATERALITY: Primary | ICD-10-CM

## 2023-07-13 PROCEDURE — 97110 THERAPEUTIC EXERCISES: CPT | Mod: PN

## 2023-07-13 PROCEDURE — 97112 NEUROMUSCULAR REEDUCATION: CPT | Mod: PN

## 2023-07-13 NOTE — PROGRESS NOTES
[facility]   Physical Therapy Treatment and Progress Note      Name: Casie Frias [place]  Clinic Number: [id number]    Therapy Diagnosis:   No diagnosis found.    Physician: Cathy Echeverria NP    Visit Date: 7/13/2023    Physician Orders: PT Eval and Treat   Medical Diagnosis from Referral: Sciatic Nerve pain  Evaluation Date: 5/22/2023  Authorization Period Expiration: 05/23/2024  Plan of Care Expiration: 07/21/2023  Progress Note Due: 07/21/2023  Visit # / Visits authorized: 1/1; 4/20  FOTO: 1/3      Precautions: cancer    PTA Visit #: 0/5     Time In: 0936  Time Out: 1016  Total Billable Time: 40 minutes    Subjective     Pt reports: she is stiff   She was not compliant with home exercise program.  Response to previous treatment: N/A  Functional change: N/A    Pain: 4/10  Location: bilateral arms     Objective      Objective Measures updated at progress report unless specified.     Treatment     Casie received the treatments listed below:      therapeutic exercises to develop strength, posture, and core stabilization for 15 minutes including:  NuStep 5 min for repetitive hip motion and postural cues  Seated ball roll [id number]  Prone hip extension 2x10  Prone quad stretch 3x20 seconds  Clamshells 2x10    neuromuscular re-education activities to improve: Coordination, Kinesthetic, Proprioception, and Posture for 25 minutes. The following activities were included:  Matrix lumbar extension 40# x20  Matrix rotation 26# x20  Posterior pelvic tilt with cues  Supine DKTC with ball 2x15   Lower trunk rotation 2x15    Therapeutic activities to improve functional performance for 00 minutes, including:  [place] carry 15#  Dead lifting from raised surface 20# 2x10  Pushing sled    Patient Education and Home Exercises       Education provided:   - Home program review     Written Home Exercises Provided: Patient instructed to cont prior HEP. Exercises were reviewed and Casie was able to  demonstrate them prior to the end of the session.  Casie demonstrated good  understanding of the education provided. See EMR under Patient Instructions for exercises provided during therapy sessions    Assessment     The patient was tired from last session but resumed progression of strengthening and stretching    Casie Is progressing well towards her goals.   Pt prognosis is Good.     Pt will continue to benefit from skilled outpatient physical therapy to address the deficits listed in the problem list box on initial evaluation, provide pt/family education and to maximize pt's level of independence in the home and community environment.     Pt's spiritual, cultural and educational needs considered and pt agreeable to plan of care and goals.     Anticipated barriers to physical therapy: None    Goals:   Short Term Goals: In 4 weeks   1. I with HEP  2. Pt to increase lumbar ROM from mid shin to proximal ankle    3. Pt to have pain less than 6/10 at all times. (Goal met 6/10/2023)     Long Term Goals: In 8 weeks  1.  Pt will improve FOTO disability score to 40% disability or less in order to improve overall QOL and return to PLOF.    2.  Patient to demo increase in LE strength to 5/5  3.  Patient to have decreased pain to 3/10 at all times.  4.  Patient to demo increase lumbar ROM to 100%  5.  Patient to perform daily activities including walking without limitation.    Plan     Plan of care Certification: 5/22/2023 to 07/21/2023.     Outpatient Physical Therapy 2 times weekly for 8 weeks to include the following interventions: Electrical Stimulation, Manual Therapy, Moist Heat/ Ice, Neuromuscular Re-ed, Patient Education, Therapeutic Activities, Therapeutic Exercise, and Dry Needling.        Andres Martines, PT

## 2023-07-14 ENCOUNTER — HOSPITAL ENCOUNTER (OUTPATIENT)
Dept: RADIOLOGY | Facility: HOSPITAL | Age: 68
Discharge: HOME | End: 2023-07-14
Attending: FAMILY MEDICINE
Payer: MEDICARE

## 2023-07-14 DIAGNOSIS — Z12.31 SCREENING MAMMOGRAM, ENCOUNTER FOR: ICD-10-CM

## 2023-07-14 PROCEDURE — 77063 MAMMO DIGITAL SCREENING BILAT WITH TOMO: ICD-10-PCS | Mod: 26,,, | Performed by: RADIOLOGY

## 2023-07-14 PROCEDURE — 77063 BREAST TOMOSYNTHESIS BI: CPT | Mod: 26,,, | Performed by: RADIOLOGY

## 2023-07-14 PROCEDURE — 77067 SCR MAMMO BI INCL CAD: CPT | Mod: 26,,, | Performed by: RADIOLOGY

## 2023-07-14 PROCEDURE — 77067 SCR MAMMO BI INCL CAD: CPT | Mod: TC

## 2023-07-14 PROCEDURE — 77067 MAMMO DIGITAL SCREENING BILAT WITH TOMO: ICD-10-PCS | Mod: 26,,, | Performed by: RADIOLOGY

## 2023-07-17 ENCOUNTER — CLINICAL SUPPORT (OUTPATIENT)
Dept: REHABILITATION | Facility: HOSPITAL | Age: 68
End: 2023-07-17
Payer: MEDICARE

## 2023-07-17 DIAGNOSIS — M54.30 SCIATIC NERVE PAIN, UNSPECIFIED LATERALITY: Primary | ICD-10-CM

## 2023-07-17 PROCEDURE — 97110 THERAPEUTIC EXERCISES: CPT | Mod: PN

## 2023-07-17 PROCEDURE — 97112 NEUROMUSCULAR REEDUCATION: CPT | Mod: PN

## 2023-07-17 NOTE — PROGRESS NOTES
[facility]   Physical Therapy Treatment Note      Name: Casie Gaines  Clinic Number: [id number]    Therapy Diagnosis:   Encounter Diagnosis   Name Primary?    Sciatic nerve pain, unspecified laterality Yes       Physician: Cathy Echeverria NP    Visit Date: 7/17/2023    Physician Orders: PT Eval and Treat   Medical Diagnosis from Referral: Sciatic Nerve pain  Evaluation Date: 5/22/2023  Authorization Period Expiration: 05/23/2024  Plan of Care Expiration: 07/21/2023  Progress Note Due: 07/21/2023  Visit # / Visits authorized: 1/1; 4/20  FOTO: 1/3      Precautions: cancer    PTA Visit #: 0/5     Time In: 1025  Time Out: 1105  Total Billable Time: 40 minutes    Subjective     Pt reports: she is doing ok  She was not compliant with home exercise program.  Response to previous treatment: N/A  Functional change: N/A    Pain: 4/10  Location: bilateral arms     Objective      Objective Measures updated at progress report unless specified.     Treatment     Casie received the treatments listed below:      therapeutic exercises to develop strength, posture, and core stabilization for 15 minutes including:  NuStep 5 min for repetitive hip motion and postural cues  Seated ball roll [id number]  Prone hip extension 2x10  Prone quad stretch 3x20 seconds  Clamshells 2x10    neuromuscular re-education activities to improve: Coordination, Kinesthetic, Proprioception, and Posture for 25 minutes. The following activities were included:  Matrix lumbar extension 40# x20  Matrix rotation 26# x20  Posterior pelvic tilt with cues  Supine DKTC with ball 2x15   Lower trunk rotation 2x15    Therapeutic activities to improve functional performance for 00 minutes, including:  [place] carry 15#  Dead lifting from raised surface 20# 2x10  Pushing sled    Patient Education and Home Exercises       Education provided:   - Home program review     Written Home Exercises Provided: Patient instructed to cont prior  HEP. Exercises were reviewed and Casie was able to demonstrate them prior to the end of the session.  Casie demonstrated good  understanding of the education provided. See EMR under Patient Instructions for exercises provided during therapy sessions    Assessment     The patient is progressing core strength and motion.  She was instructed in strengthening with [employer] equipment.    Casie Is progressing well towards her goals.   Pt prognosis is Good.     Pt will continue to benefit from skilled outpatient physical therapy to address the deficits listed in the problem list box on initial evaluation, provide pt/family education and to maximize pt's level of independence in the home and community environment.     Pt's spiritual, cultural and educational needs considered and pt agreeable to plan of care and goals.     Anticipated barriers to physical therapy: None    Goals:   Short Term Goals: In 4 weeks   1. I with HEP  2. Pt to increase lumbar ROM from mid shin to proximal ankle    3. Pt to have pain less than 6/10 at all times. (Goal met 6/10/2023)     Long Term Goals: In 8 weeks  1.  Pt will improve FOTO disability score to 40% disability or less in order to improve overall QOL and return to PLOF.    2.  Patient to demo increase in LE strength to 5/5  3.  Patient to have decreased pain to 3/10 at all times.  4.  Patient to demo increase lumbar ROM to 100%  5.  Patient to perform daily activities including walking without limitation.    Plan     Plan of care Certification: 5/22/2023 to 07/21/2023.     Outpatient Physical Therapy 2 times weekly for 8 weeks to include the following interventions: Electrical Stimulation, Manual Therapy, Moist Heat/ Ice, Neuromuscular Re-ed, Patient Education, Therapeutic Activities, Therapeutic Exercise, and Dry Needling.        Andres Martines, PT

## 2023-07-20 ENCOUNTER — CLINICAL SUPPORT (OUTPATIENT)
Dept: REHABILITATION | Facility: HOSPITAL | Age: 68
End: 2023-07-20
Payer: MEDICARE

## 2023-07-20 DIAGNOSIS — M54.30 SCIATIC NERVE PAIN, UNSPECIFIED LATERALITY: Primary | ICD-10-CM

## 2023-07-20 PROCEDURE — 97112 NEUROMUSCULAR REEDUCATION: CPT | Mod: PN

## 2023-07-20 PROCEDURE — 97110 THERAPEUTIC EXERCISES: CPT | Mod: PN

## 2023-07-20 NOTE — PROGRESS NOTES
[facility]   Physical Therapy Treatment Note      Name: Casie Gaines  Clinic Number: [id number]    Therapy Diagnosis:   No diagnosis found.      Physician: Cathy Echeverria NP    Visit Date: 7/20/2023    Physician Orders: PT Eval and Treat   Medical Diagnosis from Referral: Sciatic Nerve pain  Evaluation Date: 5/22/2023  Authorization Period Expiration: 05/23/2024  Plan of Care Expiration: 07/21/2023  Progress Note Due: 07/21/2023  Visit # / Visits authorized: 1/1; 7/20  FOTO: 1/3      Precautions: cancer    PTA Visit #: 0/5     Time In: 1025  Time Out: 1105  Total Billable Time: 40 minutes    Subjective     Pt reports: she is doing ok  She was not compliant with home exercise program.  Response to previous treatment: N/A  Functional change: N/A    Pain: 4/10  Location: bilateral arms     Objective      Objective Measures updated at progress report unless specified.     Treatment     Casie received the treatments listed below:      therapeutic exercises to develop strength, posture, and core stabilization for 15 minutes including:  NuStep 5 min for repetitive hip motion and postural cues  Seated ball roll [id number]  Prone hip extension 2x10  Prone quad stretch 3x20 seconds  Clamshells 2x10    neuromuscular re-education activities to improve: Coordination, Kinesthetic, Proprioception, and Posture for 25 minutes. The following activities were included:  Matrix lumbar extension 40# x20  Matrix rotation 26# x20  Posterior pelvic tilt with cues  Supine DKTC with ball 2x15   Lower trunk rotation 2x15    Therapeutic activities to improve functional performance for 00 minutes, including:  [place] carry 15#  Dead lifting from raised surface 20# 2x10  Pushing sled    Patient Education and Home Exercises       Education provided:   - Home program review     Written Home Exercises Provided: Patient instructed to cont prior HEP. Exercises were reviewed and Casie was able to demonstrate them  prior to the end of the session.  Casie demonstrated good  understanding of the education provided. See EMR under Patient Instructions for exercises provided during therapy sessions    Assessment     The patient is progressing core strength and motion.  She was instructed in strengthening with MedX equipment.    Casie Is progressing well towards her goals.   Pt prognosis is Good.     Pt will continue to benefit from skilled outpatient physical therapy to address the deficits listed in the problem list box on initial evaluation, provide pt/family education and to maximize pt's level of independence in the home and community environment.     Pt's spiritual, cultural and educational needs considered and pt agreeable to plan of care and goals.     Anticipated barriers to physical therapy: None    Goals:   Short Term Goals: In 4 weeks   1. I with HEP  2. Pt to increase lumbar ROM from mid shin to proximal ankle    3. Pt to have pain less than 6/10 at all times. (Goal met 6/10/2023)     Long Term Goals: In 8 weeks  1.  Pt will improve FOTO disability score to 40% disability or less in order to improve overall QOL and return to PLOF.    2.  Patient to demo increase in LE strength to 5/5  3.  Patient to have decreased pain to 3/10 at all times.  4.  Patient to demo increase lumbar ROM to 100%  5.  Patient to perform daily activities including walking without limitation.    Plan     Plan of care Certification: 5/22/2023 to 07/21/2023.     Outpatient Physical Therapy 2 times weekly for 8 weeks to include the following interventions: Electrical Stimulation, Manual Therapy, Moist Heat/ Ice, Neuromuscular Re-ed, Patient Education, Therapeutic Activities, Therapeutic Exercise, and Dry Needling.        Andres Martines, PT

## 2023-07-25 ENCOUNTER — LAB VISIT (OUTPATIENT)
Dept: LAB | Facility: HOSPITAL | Age: 68
End: 2023-07-25
Attending: OBSTETRICS & GYNECOLOGY
Payer: MEDICARE

## 2023-07-25 ENCOUNTER — CLINICAL SUPPORT (OUTPATIENT)
Dept: REHABILITATION | Facility: HOSPITAL | Age: 68
End: 2023-07-25
Payer: MEDICARE

## 2023-07-25 DIAGNOSIS — C56.3 OVARIAN CANCER, BILATERAL: ICD-10-CM

## 2023-07-25 DIAGNOSIS — M54.30 SCIATIC NERVE PAIN, UNSPECIFIED LATERALITY: Primary | ICD-10-CM

## 2023-07-25 LAB — CANCER AG125 SERPL-ACNC: 32 U/ML (ref 0–30)

## 2023-07-25 PROCEDURE — 97112 NEUROMUSCULAR REEDUCATION: CPT | Mod: PN

## 2023-07-25 PROCEDURE — 97110 THERAPEUTIC EXERCISES: CPT | Mod: PN

## 2023-07-25 PROCEDURE — 86304 IMMUNOASSAY TUMOR CA 125: CPT | Performed by: OBSTETRICS & GYNECOLOGY

## 2023-07-25 PROCEDURE — 36415 COLL VENOUS BLD VENIPUNCTURE: CPT | Performed by: OBSTETRICS & GYNECOLOGY

## 2023-07-25 NOTE — PROGRESS NOTES
[facility]   Physical Therapy Treatment Note      Name: Casie Frias [facility]  Clinic Number: [id number]    Therapy Diagnosis:   Encounter Diagnosis   Name Primary?    Sciatic nerve pain, unspecified laterality Yes         Physician: Cathy Echeverria NP    Visit Date: 7/25/2023    Physician Orders: PT Eval and Treat   Medical Diagnosis from Referral: Sciatic Nerve pain  Evaluation Date: 5/22/2023  Authorization Period Expiration: 12/31/2023  Plan of Care Expiration: 07/23/2023   Progress Note Due: 08/24/2023    Visit # / Visits authorized: 1/1; 8/20  FOTO: 1/3      Precautions: cancer    PTA Visit #: 0/5     Time In: 0903  Time Out: 0941  Total Billable Time: 38 minutes    Subjective     Pt reports: she feels better when she does therapy  She was not compliant with home exercise program.  Response to previous treatment: N/A  Functional change: N/A    Pain: 4/10  Location: bilateral arms     Objective      Objective Measures updated at progress report unless specified.     Treatment     Casie received the treatments listed below:      THERAPEUTIC EXERCISES to develop strength, ROM, and core stabilization for 10 minutes including :   Exercises   Details    NuStep   x  5 min for repetitive hip motion and postural cues    Seated ball roll outs  1minute    Prone hip extension   2x10    Prone quad stretch   3x20 seconds    Clamshells  x 2x10 with red band                       NEUROMUSCULAR RE-EDUCATION ACTIVITIES to improve Balance, Coordination, Kinesthetic, Proprioception, and Posture for 25 minutes.  The following were included:    Activity   Details    Matrix lumbar extension  x 60# x20    Matrix rotation x 26# x20    Posterior pelvic tilt with cues x 3x10 with 4 second hold    Supine DKTC with ball  x 2x15     Lower trunk rotation  x 2x15            THERAPEUTIC ACTIVITIES to improve dynamic and functional performance for 15 minutes including:   Activity   Details    [place] carry   x  15#     Dead lifting from raised surface   20# 2x10    Pushing sled x            MANUAL THERAPY TECHNIQUES including Joint mobilizations, Manual traction, and Soft tissue Mobilization were applied to the low back for 00 minutes:   Exercises   Details                 Patient Education and Home Exercises       Education provided:   - Home program review     Written Home Exercises Provided: Patient instructed to cont prior HEP. Exercises were reviewed and Casie was able to demonstrate them prior to the end of the session.  Casie demonstrated good  understanding of the education provided. See EMR under Patient Instructions for exercises provided during therapy sessions    Assessment     The patient is progressing core strength and range of motion.  She has improved in exercise performance     Casie Is progressing well towards her goals.   Pt prognosis is Good.     Pt will continue to benefit from skilled outpatient physical therapy to address the deficits listed in the problem list box on initial evaluation, provide pt/family education and to maximize pt's level of independence in the home and community environment.     Pt's spiritual, cultural and educational needs considered and pt agreeable to plan of care and goals.     Anticipated barriers to physical therapy: None    Goals:   Short Term Goals: In 4 weeks   1. I with HEP  2. Pt to increase lumbar ROM from mid shin to proximal ankle    3. Pt to have pain less than 6/10 at all times. (Goal met 6/10/2023)     Long Term Goals: In 8 weeks  1.  Pt will improve FOTO disability score to 40% disability or less in order to improve overall QOL and return to PLOF.    2.  Patient to demo increase in LE strength to 5/5  3.  Patient to have decreased pain to 3/10 at all times.  4.  Patient to demo increase lumbar ROM to 100%  5.  Patient to perform daily activities including walking without limitation.    Plan     Plan of care Certification: 5/22/2023 to 07/21/2023.     Outpatient  Physical Therapy 2 times weekly for 8 weeks to include the following interventions: Electrical Stimulation, Manual Therapy, Moist Heat/ Ice, Neuromuscular Re-ed, Patient Education, Therapeutic Activities, Therapeutic Exercise, and Dry Needling.        Andres Martines, PT

## 2023-07-26 NOTE — PLAN OF CARE
[facility]   Physical Therapy Plan of Care Update     Name: Casie Frias [place]oits  Clinic Number: [id number]    Therapy Diagnosis:   Encounter Diagnosis   Name Primary?    Sciatic nerve pain, unspecified laterality Yes         Physician: Cathy Echeverria NP    Visit Date: 7/25/2023    Physician Orders: PT Eval and Treat   Medical Diagnosis from Referral: Sciatic Nerve pain  Evaluation Date: 5/22/2023  Authorization Period Expiration: 12/31/2023  Plan of Care Expiration: 07/23/2023   Progress Note Due: 08/24/2023    Visit # / Visits authorized: 1/1; 8/20  FOTO: 1/3      Precautions: cancer    Subjective     Pt reports: she feels better when she does therapy  She was not compliant with home exercise program.  Response to previous treatment: N/A  Functional change: N/A    Pain: 4/10  Location: bilateral arms     Objective      Objective Measures updated at progress report unless specified.     Treatment     Casie received the treatments listed below:      THERAPEUTIC EXERCISES to develop strength, ROM, and core stabilization for 10 minutes including :   Exercises   Details    NuStep   x  5 min for repetitive hip motion and postural cues    Seated ball roll outs  1minute    Prone hip extension   2x10    Prone quad stretch   3x20 seconds    Clamshells  x 2x10 with red band                       NEUROMUSCULAR RE-EDUCATION ACTIVITIES to improve Balance, Coordination, Kinesthetic, Proprioception, and Posture for 25 minutes.  The following were included:    Activity   Details    Matrix lumbar extension  x 60# x20    Matrix rotation x 26# x20    Posterior pelvic tilt with cues x 3x10 with 4 second hold    Supine DKTC with ball  x 2x15     Lower trunk rotation  x 2x15            THERAPEUTIC ACTIVITIES to improve dynamic and functional performance for 15 minutes including:   Activity   Details    [place] carry   x  15#    Dead lifting from raised surface   20# 2x10    Pushing sled x            MANUAL  THERAPY TECHNIQUES including Joint mobilizations, Manual traction, and Soft tissue Mobilization were applied to the low back for 00 minutes:   Exercises   Details                 Patient Education and Home Exercises       Education provided:   - Home program review     Written Home Exercises Provided: Patient instructed to cont prior HEP. Exercises were reviewed and Casie was able to demonstrate them prior to the end of the session.  Casie demonstrated good  understanding of the education provided. See EMR under Patient Instructions for exercises provided during therapy sessions    Assessment     The patient is progressing core strength and range of motion.  She has improved in exercise performance with core activity.  She will benefit from further PT to meet set goals and improve function with out pain.    Casie Is progressing well towards her goals.   Pt prognosis is Good. .      Pt will continue to benefit from skilled outpatient physical therapy to address the deficits listed in the problem list box on initial evaluation, provide pt/family education and to maximize pt's level of independence in the home and community environment.     Pt's spiritual, cultural and educational needs considered and pt agreeable to plan of care and goals.     Anticipated barriers to physical therapy: None    Goals:   Short Term Goals: In 4 weeks   1. I with HEP  2. Pt to increase lumbar ROM from mid shin to proximal ankle    3. Pt to have pain less than 6/10 at all times. (Goal met 6/10/2023)     Long Term Goals: In 8 weeks  1.  Pt will improve FOTO disability score to 40% disability or less in order to improve overall QOL and return to PLOF.    2.  Patient to demo increase in LE strength to 5/5  3.  Patient to have decreased pain to 3/10 at all times.  4.  Patient to demo increase lumbar ROM to 100%  5.  Patient to perform daily activities including walking without limitation.    Plan     Plan of care Certification: 5/22/2023 to  07/21/2023.     Outpatient Physical Therapy 2 times weekly for 8 weeks to include the following interventions: Electrical Stimulation, Manual Therapy, Moist Heat/ Ice, Neuromuscular Re-ed, Patient Education, Therapeutic Activities, Therapeutic Exercise, and Dry Needling.        Andres Martines, PT

## 2023-07-27 ENCOUNTER — CLINICAL SUPPORT (OUTPATIENT)
Dept: REHABILITATION | Facility: HOSPITAL | Age: 68
End: 2023-07-27
Payer: MEDICARE

## 2023-07-27 DIAGNOSIS — M54.30 SCIATIC NERVE PAIN, UNSPECIFIED LATERALITY: Primary | ICD-10-CM

## 2023-07-27 PROCEDURE — 97110 THERAPEUTIC EXERCISES: CPT | Mod: PN

## 2023-07-27 PROCEDURE — 97112 NEUROMUSCULAR REEDUCATION: CPT | Mod: PN

## 2023-07-27 NOTE — PROGRESS NOTES
[facility]   Physical Therapy Treatment Note      Name: Casie Gaines  Clinic Number: [id number]    Therapy Diagnosis:   Encounter Diagnosis   Name Primary?    Sciatic nerve pain, unspecified laterality Yes     Physician: Cathy Echeverria NP    Visit Date: 7/27/2023    Physician Orders: PT Eval and Treat   Medical Diagnosis from Referral: Sciatic Nerve pain  Evaluation Date: 5/22/2023  Authorization Period Expiration: 12/31/2023  Plan of Care Expiration: 08/24/2023  Progress Note Due: 08/24/2023    Visit # / Visits authorized: 1/1; 9/20  FOTO: 1/3      Precautions: cancer    PTA Visit #: 0/5     Time In: 1108  Time Out: 1153  Total Billable Time: 45 minutes    Subjective     Pt reports: she feels ok today  She was not compliant with home exercise program.  Response to previous treatment: N/A  Functional change: N/A    Pain: 3/10  Location: bilateral arms     Objective      Objective Measures updated at progress report unless specified.     Treatment     Casie received the treatments listed below:      THERAPEUTIC EXERCISES to develop strength, ROM, and core stabilization for 18 minutes including :   Exercises   Details    NuStep   x  5 min for repetitive hip motion and postural cues    Seated ball roll outs x 1minute    Prone hip extension  x 2x10    Prone quad stretch  x 3x20 seconds    Clamshells  x 2x10 with red band                       NEUROMUSCULAR RE-EDUCATION ACTIVITIES to improve Balance, Coordination, Kinesthetic, Proprioception, and Posture for 25 minutes.  The following were included:    Activity   Details    Matrix lumbar extension  x 60# x20    Matrix rotation x 26# x20    Posterior pelvic tilt with cues x 3x10 with 4 second hold    Supine DKTC with ball  x 2x15     Lower trunk rotation  x 2x15            THERAPEUTIC ACTIVITIES to improve dynamic and functional performance for -- minutes including:   Activity   Details    [place] carry     15#    Dead lifting from  raised surface   20# 2x10    Pushing sled x            MANUAL THERAPY TECHNIQUES including Joint mobilizations, Manual traction, and Soft tissue Mobilization were applied to the low back for 00 minutes:   Exercises   Details                 Supervised modalities after being cleared for contradictions: IFC Electrical Stimulation:  Casie received IFC Electrical Stimulation for pain control applied to the ---. Pt received stimulation for --- minutes. Casie tolderated treatment well without any adverse effects.       Patient Education and Home Exercises       Education provided:   - Home program review     Written Home Exercises Provided: Patient instructed to cont prior HEP. Exercises were reviewed and Casie was able to demonstrate them prior to the end of the session.  Casie demonstrated good  understanding of the education provided. See EMR under Patient Instructions for exercises provided during therapy sessions    Assessment     The patient is progressing core strength and range of motion.  She has improved in exercise performance     Casie Is progressing well towards her goals.   Pt prognosis is Good.     Pt will continue to benefit from skilled outpatient physical therapy to address the deficits listed in the problem list box on initial evaluation, provide pt/family education and to maximize pt's level of independence in the home and community environment.     Pt's spiritual, cultural and educational needs considered and pt agreeable to plan of care and goals.     Anticipated barriers to physical therapy: None    Goals:   Short Term Goals: In 4 weeks   1. I with HEP  2. Pt to increase lumbar ROM from mid shin to proximal ankle    3. Pt to have pain less than 6/10 at all times. (Goal met 6/10/2023)     Long Term Goals: In 8 weeks  1.  Pt will improve FOTO disability score to 40% disability or less in order to improve overall QOL and return to PLOF.    2.  Patient to demo increase in LE strength to 5/5  3.  Patient  to have decreased pain to 3/10 at all times.  4.  Patient to demo increase lumbar ROM to 100%  5.  Patient to perform daily activities including walking without limitation.    Plan     Plan of care Certification: 5/22/2023 to 07/21/2023.     Outpatient Physical Therapy 2 times weekly for 8 weeks to include the following interventions: Electrical Stimulation, Manual Therapy, Moist Heat/ Ice, Neuromuscular Re-ed, Patient Education, Therapeutic Activities, Therapeutic Exercise, and Dry Needling.        Andres Martines, PT

## 2023-08-02 ENCOUNTER — CLINICAL SUPPORT (OUTPATIENT)
Dept: REHABILITATION | Facility: HOSPITAL | Age: 68
End: 2023-08-02
Payer: MEDICARE

## 2023-08-02 DIAGNOSIS — M54.30 SCIATIC NERVE PAIN, UNSPECIFIED LATERALITY: Primary | ICD-10-CM

## 2023-08-02 PROCEDURE — 97112 NEUROMUSCULAR REEDUCATION: CPT | Mod: PN

## 2023-08-02 PROCEDURE — 97110 THERAPEUTIC EXERCISES: CPT | Mod: PN

## 2023-08-02 NOTE — PROGRESS NOTES
[facility]   Physical Therapy Treatment Note      Name: Casie Gaines  Clinic Number: [id number]    Therapy Diagnosis:   Encounter Diagnosis   Name Primary?    Sciatic nerve pain, unspecified laterality Yes       Physician: Cathy Echeverria NP    Visit Date: 8/2/2023    Physician Orders: PT Eval and Treat   Medical Diagnosis from Referral: Sciatic Nerve pain  Evaluation Date: 5/22/2023  Authorization Period Expiration: 12/31/2023  Plan of Care Expiration: 08/24/2023  Progress Note Due: 08/24/2023    Visit # / Visits authorized: 1/1; 10/20  FOTO: 1/3      Precautions: cancer    PTA Visit #: 0/5     Time In: 1110  Time Out: 1155  Total Billable Time: 45 minutes    Subjective     Pt reports: she feels ok today  She was not compliant with home exercise program.  Response to previous treatment: N/A  Functional change: N/A    Pain: 3/10  Location: bilateral arms     Objective      Objective Measures updated at progress report unless specified.     Treatment     Casie received the treatments listed below:      THERAPEUTIC EXERCISES to develop strength, ROM, and core stabilization for 20 minutes including :   Exercises   Details    NuStep   x  5 min for repetitive hip motion and postural cues    Seated ball roll outs x 1minute    Prone hip extension  x 2x10    Prone quad stretch  x 3x20 seconds    Clamshells  x 2x10 with red band                       NEUROMUSCULAR RE-EDUCATION ACTIVITIES to improve Balance, Coordination, Kinesthetic, Proprioception, and Posture for 25 minutes.  The following were included:    Activity   Details    Matrix lumbar extension  x 60# x20    Matrix rotation x 26# x20    Posterior pelvic tilt with cues x 3x10 with 4 second hold    Supine DKTC with ball  x 2x15     Lower trunk rotation  x 2x15            THERAPEUTIC ACTIVITIES to improve dynamic and functional performance for -- minutes including:   Activity   Details    [place] carry     15#    Dead lifting from  raised surface   20# 2x10    Pushing sled x            MANUAL THERAPY TECHNIQUES including Joint mobilizations, Manual traction, and Soft tissue Mobilization were applied to the low back for 00 minutes:   Exercises   Details                 Supervised modalities after being cleared for contradictions: IFC Electrical Stimulation:  Casie received IFC Electrical Stimulation for pain control applied to the ---. Pt received stimulation for --- minutes. Casie tolderated treatment well without any adverse effects.       Patient Education and Home Exercises       Education provided:   - Home program review     Written Home Exercises Provided: Patient instructed to cont prior HEP. Exercises were reviewed and Casie was able to demonstrate them prior to the end of the session.  Casie demonstrated good  understanding of the education provided. See EMR under Patient Instructions for exercises provided during therapy sessions    Assessment     The patient is progressing core strength and range of motion.  She has improved in exercise performance     Casie Is progressing well towards her goals.   Pt prognosis is Good.     Pt will continue to benefit from skilled outpatient physical therapy to address the deficits listed in the problem list box on initial evaluation, provide pt/family education and to maximize pt's level of independence in the home and community environment.     Pt's spiritual, cultural and educational needs considered and pt agreeable to plan of care and goals.     Anticipated barriers to physical therapy: None    Goals:   Short Term Goals: In 4 weeks   1. I with HEP  2. Pt to increase lumbar ROM from mid shin to proximal ankle    3. Pt to have pain less than 6/10 at all times. (Goal met 6/10/2023)     Long Term Goals: In 8 weeks  1.  Pt will improve FOTO disability score to 40% disability or less in order to improve overall QOL and return to PLOF.    2.  Patient to demo increase in LE strength to 5/5  3.  Patient  to have decreased pain to 3/10 at all times.  4.  Patient to demo increase lumbar ROM to 100%  5.  Patient to perform daily activities including walking without limitation.    Plan     Plan of care Certification: 5/22/2023 to 07/21/2023.     Outpatient Physical Therapy 2 times weekly for 8 weeks to include the following interventions: Electrical Stimulation, Manual Therapy, Moist Heat/ Ice, Neuromuscular Re-ed, Patient Education, Therapeutic Activities, Therapeutic Exercise, and Dry Needling.        Andres Martines, PT

## 2023-08-04 ENCOUNTER — CLINICAL SUPPORT (OUTPATIENT)
Dept: REHABILITATION | Facility: HOSPITAL | Age: 68
End: 2023-08-04
Payer: MEDICARE

## 2023-08-04 DIAGNOSIS — M54.30 SCIATIC NERVE PAIN, UNSPECIFIED LATERALITY: Primary | ICD-10-CM

## 2023-08-04 PROCEDURE — 97530 THERAPEUTIC ACTIVITIES: CPT | Mod: PN

## 2023-08-04 PROCEDURE — 97110 THERAPEUTIC EXERCISES: CPT | Mod: PN

## 2023-08-04 PROCEDURE — 97112 NEUROMUSCULAR REEDUCATION: CPT | Mod: PN

## 2023-08-04 NOTE — PROGRESS NOTES
[facility]   Physical Therapy Treatment Note      Name: Casie Frias [place]innells  Clinic Number: [id number]    Therapy Diagnosis:   Encounter Diagnosis   Name Primary?    Sciatic nerve pain, unspecified laterality Yes       Physician: Cathy Echeverria NP    Visit Date: 8/4/2023    Physician Orders: PT Eval and Treat   Medical Diagnosis from Referral: Sciatic Nerve pain  Evaluation Date: 5/22/2023  Authorization Period Expiration: 12/31/2023  Plan of Care Expiration: 08/24/2023  Progress Note Due: 08/24/2023    Visit # / Visits authorized: 1/1; 11/20  FOTO: 1/3      Precautions: cancer    PTA Visit #: 0/5     Time In: 1101  Time Out: 1155  Total Billable Time: 54 minutes    Subjective     Pt reports: she feels ok today  She was not compliant with home exercise program.  Response to previous treatment: N/A  Functional change: N/A    Pain: 3/10  Location: bilateral arms     Objective      Objective Measures updated at progress report unless specified.     Treatment     Casie received the treatments listed below:      THERAPEUTIC EXERCISES to develop strength, ROM, and core stabilization for 20 minutes including :   Exercises   Details    NuStep   x  5 min for repetitive hip motion and postural cues    Seated ball roll outs x 1minute    Prone hip extension  x 2x10    Prone quad stretch  x 3x20 seconds    Clamshells  x 2x10 with red band                       NEUROMUSCULAR RE-EDUCATION ACTIVITIES to improve Balance, Coordination, Kinesthetic, Proprioception, and Posture for 24 minutes.  The following were included:    Activity   Details    Matrix lumbar extension  x 60# x20    Matrix rotation x 26# x20    Posterior pelvic tilt with cues x 3x10 with 4 second hold    Supine DKTC with ball  x 2x15     Lower trunk rotation  x 2x15            THERAPEUTIC ACTIVITIES to improve dynamic and functional performance for 10 minutes including:   Activity   Details    [place] carry     15#    Dead lifting from  raised surface   20# 2x10    Pushing sled x            MANUAL THERAPY TECHNIQUES including Joint mobilizations, Manual traction, and Soft tissue Mobilization were applied to the low back for 00 minutes:   Exercises   Details                 Supervised modalities after being cleared for contradictions: IFC Electrical Stimulation:  Casie received IFC Electrical Stimulation for pain control applied to the ---. Pt received stimulation for --- minutes. Casie tolderated treatment well without any adverse effects.       Patient Education and Home Exercises       Education provided:   - Home program review     Written Home Exercises Provided: Patient instructed to cont prior HEP. Exercises were reviewed and Casie was able to demonstrate them prior to the end of the session.  Casie demonstrated good  understanding of the education provided. See EMR under Patient Instructions for exercises provided during therapy sessions    Assessment     The patient is progressing core strength and range of motion.  She has improved in exercise performance     Casie Is progressing well towards her goals.   Pt prognosis is Good.     Pt will continue to benefit from skilled outpatient physical therapy to address the deficits listed in the problem list box on initial evaluation, provide pt/family education and to maximize pt's level of independence in the home and community environment.     Pt's spiritual, cultural and educational needs considered and pt agreeable to plan of care and goals.     Anticipated barriers to physical therapy: None    Goals:   Short Term Goals: In 4 weeks   1. I with HEP  2. Pt to increase lumbar ROM from mid shin to proximal ankle    3. Pt to have pain less than 6/10 at all times. (Goal met 6/10/2023)     Long Term Goals: In 8 weeks  1.  Pt will improve FOTO disability score to 40% disability or less in order to improve overall QOL and return to PLOF.    2.  Patient to demo increase in LE strength to 5/5  3.  Patient  to have decreased pain to 3/10 at all times.  4.  Patient to demo increase lumbar ROM to 100%  5.  Patient to perform daily activities including walking without limitation.    Plan     Plan of care Certification: 5/22/2023 to 07/21/2023.     Outpatient Physical Therapy 2 times weekly for 8 weeks to include the following interventions: Electrical Stimulation, Manual Therapy, Moist Heat/ Ice, Neuromuscular Re-ed, Patient Education, Therapeutic Activities, Therapeutic Exercise, and Dry Needling.        Andres Martines, PT

## 2023-08-07 ENCOUNTER — CLINICAL SUPPORT (OUTPATIENT)
Dept: REHABILITATION | Facility: HOSPITAL | Age: 68
End: 2023-08-07
Payer: MEDICARE

## 2023-08-07 DIAGNOSIS — M54.30 SCIATIC NERVE PAIN, UNSPECIFIED LATERALITY: Primary | ICD-10-CM

## 2023-08-07 PROCEDURE — 97530 THERAPEUTIC ACTIVITIES: CPT | Mod: PN

## 2023-08-07 PROCEDURE — 97110 THERAPEUTIC EXERCISES: CPT | Mod: PN

## 2023-08-07 PROCEDURE — 97112 NEUROMUSCULAR REEDUCATION: CPT | Mod: PN

## 2023-08-07 NOTE — PROGRESS NOTES
[facility]   Physical Therapy Treatment Note      Name: Casie Gaines  Clinic Number: [id number]    Therapy Diagnosis:   Encounter Diagnosis   Name Primary?    Sciatic nerve pain, unspecified laterality Yes       Physician: Cathy Echeverria NP    Visit Date: 8/7/2023    Physician Orders: PT Eval and Treat   Medical Diagnosis from Referral: Sciatic Nerve pain  Evaluation Date: 5/22/2023  Authorization Period Expiration: 12/31/2023  Plan of Care Expiration: 08/24/2023  Progress Note Due: 08/24/2023    Visit # / Visits authorized: 1/1; 12/20  FOTO: 2/3      Precautions: cancer    PTA Visit #: 0/5     Time In: 1100  Time Out: 1153  Total Billable Time: 53 minutes    Subjective     Pt reports: she feels ok today  She was not compliant with home exercise program.  Response to previous treatment: N/A  Functional change: N/A    Pain: 0/10  Location: bilateral arms     Objective      Objective Measures updated at progress report unless specified.     Treatment     Casie received the treatments listed below:      THERAPEUTIC EXERCISES to develop strength, ROM, and core stabilization for 20 minutes including :   Exercises   Details    NuStep   x  5 min for repetitive hip motion and postural cues    Seated ball roll outs x 1minute    Prone hip extension  x 2x10    Prone quad stretch  x 3x20 seconds    Clamshells  x 2x10 with red band                       NEUROMUSCULAR RE-EDUCATION ACTIVITIES to improve Balance, Coordination, Kinesthetic, Proprioception, and Posture for 25 minutes.  The following were included:    Activity   Details    Matrix lumbar extension  x 60# x20    Matrix rotation x 26# x20    Posterior pelvic tilt with cues x 3x10 with 4 second hold    Supine DKTC with ball  x 2x15     Lower trunk rotation  x 2x15            THERAPEUTIC ACTIVITIES to improve dynamic and functional performance for 8 minutes including:   Activity   Details    Pushing sled x            MANUAL THERAPY  TECHNIQUES including Joint mobilizations, Manual traction, and Soft tissue Mobilization were applied to the low back for 00 minutes:   Exercises   Details                 Supervised modalities after being cleared for contradictions: IFC Electrical Stimulation:  Casie received IFC Electrical Stimulation for pain control applied to the ---. Pt received stimulation for --- minutes. Casie tolderated treatment well without any adverse effects.       Patient Education and Home Exercises       Education provided:   - Home program review     Written Home Exercises Provided: Patient instructed to cont prior HEP. Exercises were reviewed and Casie was able to demonstrate them prior to the end of the session.  Casie demonstrated good  understanding of the education provided. See EMR under Patient Instructions for exercises provided during therapy sessions    Assessment     The patient is progressing core strength and range of motion.  She has improved in exercise performance     Casie Is progressing well towards her goals.   Pt prognosis is Good.     Pt will continue to benefit from skilled outpatient physical therapy to address the deficits listed in the problem list box on initial evaluation, provide pt/family education and to maximize pt's level of independence in the home and community environment.     Pt's spiritual, cultural and educational needs considered and pt agreeable to plan of care and goals.     Anticipated barriers to physical therapy: None    Goals:   Short Term Goals: In 4 weeks   1. I with HEP  2. Pt to increase lumbar ROM from mid shin to proximal ankle    3. Pt to have pain less than 6/10 at all times. (Goal met 6/10/2023)     Long Term Goals: In 8 weeks  1.  Pt will improve FOTO disability score to 40% disability or less in order to improve overall QOL and return to PLOF.    2.  Patient to demo increase in LE strength to 5/5  3.  Patient to have decreased pain to 3/10 at all times.  4.  Patient to demo  increase lumbar ROM to 100%  5.  Patient to perform daily activities including walking without limitation.    Plan     Plan of care Certification: 5/22/2023 to 07/21/2023.     Outpatient Physical Therapy 2 times weekly for 8 weeks to include the following interventions: Electrical Stimulation, Manual Therapy, Moist Heat/ Ice, Neuromuscular Re-ed, Patient Education, Therapeutic Activities, Therapeutic Exercise, and Dry Needling.        Andres Martines, PT

## 2023-08-09 ENCOUNTER — PATIENT MESSAGE (OUTPATIENT)
Dept: GYNECOLOGIC ONCOLOGY | Facility: CLINIC | Age: 68
End: 2023-08-09
Payer: MEDICARE

## 2023-08-09 ENCOUNTER — DOCUMENTATION ONLY (OUTPATIENT)
Dept: REHABILITATION | Facility: HOSPITAL | Age: 68
End: 2023-08-09
Payer: MEDICARE

## 2023-08-09 NOTE — PROGRESS NOTES
The patient requested to cancel her visit due to no pain for last 2 weeks and wants to try to work on it at home.  She was instructed to call for appointment if she needs to return in near future or call physician if she needs to return at later date.

## 2023-08-17 ENCOUNTER — PATIENT MESSAGE (OUTPATIENT)
Dept: INTERNAL MEDICINE | Facility: CLINIC | Age: 68
End: 2023-08-17
Payer: MEDICARE

## 2023-08-17 DIAGNOSIS — C56.3 OVARIAN CANCER, BILATERAL: ICD-10-CM

## 2023-08-17 DIAGNOSIS — C78.6 PERITONEAL CARCINOMATOSIS: Primary | ICD-10-CM

## 2023-08-17 DIAGNOSIS — Z90.722 S/P BSO (BILATERAL SALPINGO-OOPHORECTOMY): ICD-10-CM

## 2023-08-17 DIAGNOSIS — C56.3 OVARIAN CANCER, BILATERAL: Primary | ICD-10-CM

## 2023-08-18 ENCOUNTER — OFFICE VISIT (OUTPATIENT)
Dept: DERMATOLOGY | Facility: CLINIC | Age: 68
End: 2023-08-18
Payer: MEDICARE

## 2023-08-18 ENCOUNTER — TELEPHONE (OUTPATIENT)
Dept: GYNECOLOGIC ONCOLOGY | Facility: CLINIC | Age: 68
End: 2023-08-18
Payer: MEDICARE

## 2023-08-18 DIAGNOSIS — Z51.11 ENCOUNTER FOR ANTINEOPLASTIC CHEMOTHERAPY AND IMMUNOTHERAPY: ICD-10-CM

## 2023-08-18 DIAGNOSIS — L82.1 DERMATOSIS PAPULOSA NIGRA: ICD-10-CM

## 2023-08-18 DIAGNOSIS — C56.3 OVARIAN CANCER, BILATERAL: Primary | ICD-10-CM

## 2023-08-18 DIAGNOSIS — Z51.12 ENCOUNTER FOR ANTINEOPLASTIC CHEMOTHERAPY AND IMMUNOTHERAPY: ICD-10-CM

## 2023-08-18 DIAGNOSIS — C56.9 MALIGNANT NEOPLASM OF OVARY, UNSPECIFIED LATERALITY: ICD-10-CM

## 2023-08-18 DIAGNOSIS — D23.9 DILATED PORE OF WINER: Primary | ICD-10-CM

## 2023-08-18 PROCEDURE — 99212 OFFICE O/P EST SF 10 MIN: CPT | Mod: S$GLB,,, | Performed by: STUDENT IN AN ORGANIZED HEALTH CARE EDUCATION/TRAINING PROGRAM

## 2023-08-18 PROCEDURE — 3044F PR MOST RECENT HEMOGLOBIN A1C LEVEL <7.0%: ICD-10-PCS | Mod: CPTII,S$GLB,, | Performed by: STUDENT IN AN ORGANIZED HEALTH CARE EDUCATION/TRAINING PROGRAM

## 2023-08-18 PROCEDURE — 3044F HG A1C LEVEL LT 7.0%: CPT | Mod: CPTII,S$GLB,, | Performed by: STUDENT IN AN ORGANIZED HEALTH CARE EDUCATION/TRAINING PROGRAM

## 2023-08-18 PROCEDURE — 1160F PR REVIEW ALL MEDS BY PRESCRIBER/CLIN PHARMACIST DOCUMENTED: ICD-10-PCS | Mod: CPTII,S$GLB,, | Performed by: STUDENT IN AN ORGANIZED HEALTH CARE EDUCATION/TRAINING PROGRAM

## 2023-08-18 PROCEDURE — 1159F PR MEDICATION LIST DOCUMENTED IN MEDICAL RECORD: ICD-10-PCS | Mod: CPTII,S$GLB,, | Performed by: STUDENT IN AN ORGANIZED HEALTH CARE EDUCATION/TRAINING PROGRAM

## 2023-08-18 PROCEDURE — 99212 PR OFFICE/OUTPT VISIT, EST, LEVL II, 10-19 MIN: ICD-10-PCS | Mod: S$GLB,,, | Performed by: STUDENT IN AN ORGANIZED HEALTH CARE EDUCATION/TRAINING PROGRAM

## 2023-08-18 PROCEDURE — 1159F MED LIST DOCD IN RCRD: CPT | Mod: CPTII,S$GLB,, | Performed by: STUDENT IN AN ORGANIZED HEALTH CARE EDUCATION/TRAINING PROGRAM

## 2023-08-18 PROCEDURE — 1126F PR PAIN SEVERITY QUANTIFIED, NO PAIN PRESENT: ICD-10-PCS | Mod: CPTII,S$GLB,, | Performed by: STUDENT IN AN ORGANIZED HEALTH CARE EDUCATION/TRAINING PROGRAM

## 2023-08-18 PROCEDURE — 99999 PR PBB SHADOW E&M-EST. PATIENT-LVL III: CPT | Mod: PBBFAC,,, | Performed by: STUDENT IN AN ORGANIZED HEALTH CARE EDUCATION/TRAINING PROGRAM

## 2023-08-18 PROCEDURE — 99999 PR PBB SHADOW E&M-EST. PATIENT-LVL III: ICD-10-PCS | Mod: PBBFAC,,, | Performed by: STUDENT IN AN ORGANIZED HEALTH CARE EDUCATION/TRAINING PROGRAM

## 2023-08-18 PROCEDURE — 1160F RVW MEDS BY RX/DR IN RCRD: CPT | Mod: CPTII,S$GLB,, | Performed by: STUDENT IN AN ORGANIZED HEALTH CARE EDUCATION/TRAINING PROGRAM

## 2023-08-18 PROCEDURE — 1126F AMNT PAIN NOTED NONE PRSNT: CPT | Mod: CPTII,S$GLB,, | Performed by: STUDENT IN AN ORGANIZED HEALTH CARE EDUCATION/TRAINING PROGRAM

## 2023-08-18 NOTE — PROGRESS NOTES
Patient Information  Name: Casie Gaines  : 1955  MRN: [id number]     Referring Physician:  Dr. Ang   Primary Care Physician:  Dr. Ang, Rossana YLON MD   Date of Visit: 2023      Subjective:       Casie Gaines is a 67 y.o. female who presents for   Chief Complaint   Patient presents with    hair bumps      X 3 months. Tx none.      HPI  Patient with new complaint of lesion(s)  Location: face  Duration: 3 months  Symptoms: clogged pore  Relieving factors/Previous treatments: none    Patient was last seen:Visit date not found     Prior notes by myself reviewed.   Clinical documentation obtained by nursing staff reviewed.    Review of Systems   Skin:  Negative for itching and rash.        Objective:    Physical Exam   Constitutional: She appears well-developed and well-nourished. No distress.   Neurological: She is alert and oriented to person, place, and time. She is not disoriented.   Psychiatric: She has a normal mood and affect.   Skin:   Areas Examined (abnormalities noted in diagram):   Head / Face Inspection Performed              Diagram Legend     Erythematous scaling macule/papule c/w actinic keratosis       Vascular papule c/w angioma      Pigmented verrucoid papule/plaque c/w seborrheic keratosis      Yellow umbilicated papule c/w sebaceous hyperplasia      Irregularly shaped tan macule c/w lentigo     1-2 mm smooth white papules consistent with Milia      Movable subcutaneous cyst with punctum c/w epidermal inclusion cyst      Subcutaneous movable cyst c/w pilar cyst      Firm pink to brown papule c/w dermatofibroma      Pedunculated fleshy papule(s) c/w skin tag(s)      Evenly pigmented macule c/w junctional nevus     Mildly variegated pigmented, slightly irregular-bordered macule c/w mildly atypical nevus      Flesh colored to evenly pigmented papule c/w intradermal nevus       Pink pearly papule/plaque c/w basal cell carcinoma      Erythematous hyperkeratotic cursted plaque  c/w SCC      Surgical scar with no sign of skin cancer recurrence      Open and closed comedones      Inflammatory papules and pustules      Verrucoid papule consistent consistent with wart     Erythematous eczematous patches and plaques     Dystrophic onycholytic nail with subungual debris c/w onychomycosis     Umbilicated papule    Erythematous-base heme-crusted tan verrucoid plaque consistent with inflamed seborrheic keratosis     Erythematous Silvery Scaling Plaque c/w Psoriasis     See annotation      No images are attached to the encounter or orders placed in the encounter.    [] Data reviewed  [] Independent review of test  [] Management discussed with another provider    Assessment / Plan:        Dilated pore of Barbara  Reassurance given to patient. No treatment is necessary.   If interested, patient can come back for punch removal if lesions become bothersome    Dermatosis papulosa nigra  -     Reassurance given to patient. No treatment is necessary.   Treatment of benign, asymptomatic lesions may be considered cosmetic.               LOS NUMBER AND COMPLEXITY OF PROBLEMS    COMPLEXITY OF DATA RISK TOTAL TIME (m)   29068  32226 [] 1 self-limited or minor problem [x] Minimal to none [x] No treatment recommended or patient to monitor 15-29  10-19   41412  81105 Low  [] 2 or > self limited or minor problems  [] 1 stable chronic illness  [] 1 acute, uncomplicated illness or injury Limited (2)  [] Prior external notes from each unique source  [] Review result of each unique test  [] Order each unique test []  Low  OTC medications, minor skin biopsy 30-44 20-29   80734  52631 Moderate  []  1 or > chronic illness with progression, exacerbation or SE of treatment  [x]  2 or more stable chronic illnesses  []  1 acute illness with systemic symptoms  []  1 acute complicated injury  []  1 undiagnosed new problem with uncertain prognosis Moderate (1/3 below)  []  3 or more data items        *Now includes assessment  requiring independent historian  []  Independent interpretation of a test  []  Discuss management/test with another provider Moderate  []  Prescription drug mgmt  []  Minor surgery with risk discussed  []  Mgmt limited by social determinates 45-59  30-39   99140  05124 High  []  1 or more chronic illness with severe exacerbation, progression or SE of treatment  []  1 acute or chronic illness/injury that poses a threat to life or bodily function Extensive (2/3 below)  []  3 or more data items        *Now includes assessment requiring independent historian.  []  Independent interpretation of a test  []  Discuss management/test with another provider High  []  Major surgery with risk discussed  []  Drug therapy requiring intensive monitoring for toxicity  []  Hospitalization  []  Decision for DNR 60-74  40-54      No follow-ups on file.    Olivia Montanez MD, FAAD  [facility]

## 2023-08-19 DIAGNOSIS — C56.9 MALIGNANT NEOPLASM OF OVARY, UNSPECIFIED LATERALITY: ICD-10-CM

## 2023-08-21 ENCOUNTER — HOSPITAL ENCOUNTER (OUTPATIENT)
Dept: RADIOLOGY | Facility: HOSPITAL | Age: 68
Discharge: HOME | End: 2023-08-21
Attending: OBSTETRICS & GYNECOLOGY
Payer: MEDICARE

## 2023-08-21 DIAGNOSIS — C56.3 OVARIAN CANCER, BILATERAL: ICD-10-CM

## 2023-08-21 PROCEDURE — 74177 CT CHEST ABDOMEN PELVIS WITH CONTRAST (XPD): ICD-10-PCS | Mod: 26,,, | Performed by: RADIOLOGY

## 2023-08-21 PROCEDURE — 74177 CT ABD & PELVIS W/CONTRAST: CPT | Mod: 26,,, | Performed by: RADIOLOGY

## 2023-08-21 PROCEDURE — 25500020 PHARM REV CODE 255: Performed by: OBSTETRICS & GYNECOLOGY

## 2023-08-21 PROCEDURE — 71260 CT THORAX DX C+: CPT | Mod: 26,,, | Performed by: RADIOLOGY

## 2023-08-21 PROCEDURE — 71260 CT CHEST ABDOMEN PELVIS WITH CONTRAST (XPD): ICD-10-PCS | Mod: 26,,, | Performed by: RADIOLOGY

## 2023-08-21 PROCEDURE — A9698 NON-RAD CONTRAST MATERIALNOC: HCPCS | Performed by: OBSTETRICS & GYNECOLOGY

## 2023-08-21 PROCEDURE — 71260 CT THORAX DX C+: CPT | Mod: TC

## 2023-08-21 RX ADMIN — IOHEXOL 100 ML: 350 INJECTION, SOLUTION INTRAVENOUS at 11:08

## 2023-08-21 RX ADMIN — IOHEXOL 1000 ML: 9 SOLUTION ORAL at 11:08

## 2023-08-22 ENCOUNTER — PATIENT MESSAGE (OUTPATIENT)
Dept: INTERNAL MEDICINE | Facility: CLINIC | Age: 68
End: 2023-08-22
Payer: MEDICARE

## 2023-08-23 RX ORDER — AMLODIPINE BESYLATE 5 MG/1
10 TABLET ORAL DAILY
Qty: 180 TABLET | Refills: 3 | Status: SHIPPED | OUTPATIENT
Start: 2023-08-23

## 2023-08-24 NOTE — TELEPHONE ENCOUNTER
Refill Decision Note   Casie Eder  is requesting a refill authorization.  Brief Assessment and Rationale for Refill:  Approve     Medication Therapy Plan:         Comments:     Note composed:7:23 PM 08/23/2023

## 2023-08-29 ENCOUNTER — TELEPHONE (OUTPATIENT)
Dept: INTERNAL MEDICINE | Facility: CLINIC | Age: 68
End: 2023-08-29
Payer: MEDICARE

## 2023-08-29 NOTE — TELEPHONE ENCOUNTER
----- Message from Rossana Ang MD sent at 8/28/2023  7:07 AM CDT -----  The oncology team does their own scheduling, correct?  I have placed a referral to oncology  Please assist in scheduling her.  I am not able to override schedules  I reached out to Dr Juarez in [facility] and we correlated to get her CT done.      ----- Message -----  From: Shannan Fitzgerald LPN  Sent: 8/25/2023   4:00 PM CDT  To: Rossana Ang MD    Pt is requesting to have a referral to see a new oncologist. States that she couldn't transport to and from appointment to [place]. She wanted to know if you can refer her to see a different one other than Dr. Reeder (per pt request). She states that if we don't have one that's closer, she would like a referral to [facility]. Advised pt that we will give her a call back once we here from you. I do see a referral posted on 8/17/2023, but pt hasn't heard from any one to schedule her appointments as of yet.  ----- Message -----  From: Rossi Recinos  Sent: 8/25/2023   3:19 PM CDT  To: Sav Tracy Staff    Pt is requesting a call back regarding a referral. Call back number is [phone]. Thx.EL

## 2023-09-08 ENCOUNTER — TELEPHONE (OUTPATIENT)
Dept: GYNECOLOGIC ONCOLOGY | Facility: CLINIC | Age: 68
End: 2023-09-08
Payer: MEDICARE

## 2023-09-08 NOTE — TELEPHONE ENCOUNTER
----- Message from Sanjay Marquis sent at 9/8/2023 10:19 AM CDT -----  Name of Who is Calling: REAL HERNANDEZ [[id number]]            What is the request in detail: Patient is requesting call back to get referral sent [facility] for chemo  Need external referral        [phone]

## 2023-09-11 ENCOUNTER — OFFICE VISIT (OUTPATIENT)
Dept: URGENT CARE | Facility: CLINIC | Age: 68
End: 2023-09-11
Payer: MEDICARE

## 2023-09-11 VITALS
DIASTOLIC BLOOD PRESSURE: 56 MMHG | TEMPERATURE: 99 F | BODY MASS INDEX: 38.51 KG/M2 | RESPIRATION RATE: 18 BRPM | HEIGHT: 67 IN | HEART RATE: 65 BPM | WEIGHT: 245.38 LBS | OXYGEN SATURATION: 97 % | SYSTOLIC BLOOD PRESSURE: 107 MMHG

## 2023-09-11 DIAGNOSIS — R05.9 COUGH, UNSPECIFIED TYPE: Primary | ICD-10-CM

## 2023-09-11 DIAGNOSIS — Z20.822 CLOSE EXPOSURE TO COVID-19 VIRUS: ICD-10-CM

## 2023-09-11 LAB
CTP QC/QA: YES
SARS-COV-2 AG RESP QL IA.RAPID: NEGATIVE

## 2023-09-11 PROCEDURE — 87811 SARS CORONAVIRUS 2 ANTIGEN POCT, MANUAL READ: ICD-10-PCS | Mod: QW,S$GLB,, | Performed by: NURSE PRACTITIONER

## 2023-09-11 PROCEDURE — 99213 PR OFFICE/OUTPT VISIT, EST, LEVL III, 20-29 MIN: ICD-10-PCS | Mod: S$GLB,,, | Performed by: NURSE PRACTITIONER

## 2023-09-11 PROCEDURE — 87811 SARS-COV-2 COVID19 W/OPTIC: CPT | Mod: QW,S$GLB,, | Performed by: NURSE PRACTITIONER

## 2023-09-11 PROCEDURE — 99213 OFFICE O/P EST LOW 20 MIN: CPT | Mod: S$GLB,,, | Performed by: NURSE PRACTITIONER

## 2023-09-11 NOTE — PROGRESS NOTES
"Subjective:      Patient ID: Casie Gaines is a 67 y.o. female.    Vitals:  height is 5' 7" (1.702 m) and weight is 111.3 kg (245 lb 6 oz). Her oral temperature is 98.6 °F (37 °C). Her blood pressure is 107/56 (abnormal) and her pulse is 65. Her respiration is 18 and oxygen saturation is 97%.     Chief Complaint: Cough    Casie Gaines is a 67 year old female whom presents to urgent care with runny nose and headaches. Symptoms started approximately two days ago. No medication taken. Patient has been exposed to covid by her son whom tested positive on yesterday.     Cough  This is a new problem. Associated symptoms include headaches, nasal congestion, a sore throat and shortness of breath. Pertinent negatives include no chest pain, chills, ear congestion, ear pain, fever, heartburn, hemoptysis, myalgias, postnasal drip, rash, rhinorrhea, sweats, weight loss or wheezing. Nothing aggravates the symptoms. She has tried nothing for the symptoms. The treatment provided no relief.       Constitution: Negative for chills and fever.   HENT:  Positive for sore throat. Negative for ear pain and postnasal drip.    Cardiovascular:  Negative for chest pain.   Respiratory:  Positive for cough and shortness of breath. Negative for bloody sputum and wheezing.    Gastrointestinal:  Negative for heartburn.   Musculoskeletal:  Negative for muscle ache.   Skin:  Negative for rash.   Neurological:  Positive for headaches.      Objective:     Physical Exam   Constitutional: She is oriented to person, place, and time. She appears well-developed. She is cooperative.  Non-toxic appearance. She does not appear ill. No distress.   HENT:   Head: Normocephalic and atraumatic.   Ears:   Right Ear: Hearing, tympanic membrane, external ear and ear canal normal.   Left Ear: Hearing, tympanic membrane, external ear and ear canal normal.   Nose: Nose normal. No mucosal edema, rhinorrhea or nasal deformity. No epistaxis. Right sinus " exhibits no maxillary sinus tenderness and no frontal sinus tenderness. Left sinus exhibits no maxillary sinus tenderness and no frontal sinus tenderness.   Mouth/Throat: Uvula is midline, oropharynx is clear and moist and mucous membranes are normal. Mucous membranes are moist. No trismus in the jaw. Normal dentition. No uvula swelling. No oropharyngeal exudate, posterior oropharyngeal edema or posterior oropharyngeal erythema. Oropharynx is clear.   Eyes: Conjunctivae and lids are normal. No scleral icterus.   Neck: Trachea normal and phonation normal. Neck supple. No edema present. No erythema present. No neck rigidity present.   Cardiovascular: Normal rate, regular rhythm, normal heart sounds and normal pulses.   Pulmonary/Chest: Effort normal and breath sounds normal. No respiratory distress. She has no decreased breath sounds. She has no rhonchi.   Abdominal: Normal appearance. Soft.   Musculoskeletal: Normal range of motion.         General: No deformity. Normal range of motion.   Neurological: no focal deficit. She is alert and oriented to person, place, and time. She exhibits normal muscle tone. Coordination normal.   Skin: Skin is warm, dry, intact, not diaphoretic and not pale. Capillary refill takes less than 2 seconds.   Psychiatric: Her speech is normal and behavior is normal. Judgment and thought content normal.   Nursing note and vitals reviewed.    Results for orders placed or performed in visit on 09/11/23   SARS Coronavirus 2 Antigen, POCT Manual Read   Result Value Ref Range    SARS Coronavirus 2 Antigen Negative Negative     Acceptable Yes      *Note: Due to a large number of results and/or encounters for the requested time period, some results have not been displayed. A complete set of results can be found in Results Review.       Assessment:     1. Cough, unspecified type    2. Close exposure to COVID-19 virus        Plan:       Cough, unspecified type  -     SARS Coronavirus 2  Antigen, POCT Manual Read    Close exposure to COVID-19 virus          Medical Decision Making:   Clinical Tests:   Lab Tests: Ordered and Reviewed       <> Summary of Lab: Covid negative  Urgent Care Management:  Previous encounters  were independently reviewed. Reviewed negative COVID results with patient. Given patients presenting symptoms and covid exposure discussed recommendation of repeat COVID testing within 48 hours if symptoms persist.  Discussed symptomatic treatment. Additional plan of care as outlined above.Treatment plan as well as options and alternatives reviewed and discussed with patient. Follow up in 48 hours for repeat testing if symptoms persist. Report to the nearest ER with worsening/new concerning symptoms.  All of the patients questions and concerns were addressed.The patient verbalized understanding and agrees with the discussed plan of care. Patient remained stable and was discharged in no acute distress.         Patient Instructions   You have tested negative for COVID on our Binax test. As a follow up the recommendation is if you have symptoms within the first 7 days to retest within 48 hours. If you have NO SYMPTONS then you should test every 48 hours two more times.    PLEASE READ YOUR DISCHARGE INSTRUCTIONS ENTIRELY AS IT CONTAINS IMPORTANT INFORMATION.    Please drink plenty of fluids.    Please get plenty of rest.    Please return here or go to the Emergency Department for any concerns or worsening of condition.    Please take an over the counter antihistamine medication (Allegra/Claritin/Zyrtec/xyzal) of your choice as directed for allergy symptoms and/or runny nose and postnasal drip.    Try an over the counter decongestant for sinus pressure/ear pressure, congestion symptoms like Mucinex D or Sudafed or Phenylephrine. You buy this behind the pharmacy counter.    If you do have Hypertension or palpitations, it is safe to take Coricidin HBP for relief of sinus symptoms.    Certain  OTC cough and cold medications may raise your blood pressure. To keep your blood pressure regulated avoid over-the-counter decongestants and multisymptom cold remedies that contain decongestants -- such as pseudoephedrine, ephedrine, phenylephrine, naphazoline and oxymetazoline. Also, check the label for high sodium content, which can also raise blood pressure.       Tylenol or ibuprofen can also be used as directed for pain and fever unless you have an allergy to them or medical condition such as stomach ulcers, kidney or liver disease or blood thinners etc for which you should not be taking these type of medications.     SORE THROAT:    You may gargle with hot salt water 4 times a day for the next 2 days and then you may also gargle diluted hydrogen peroxide once to twice daily to alleviate some of your throat discomfort.  Drink plenty of fluids, recommend warm tea with honey.     YOU MAY USE OVER-THE-COUNTER CEPACOL FOR SOOTHING OF YOUR THROAT.  You may wish to avoid spicy food, citrus fruits, and red sauces- as this may irritate the throat more.        Use over the counter Flonase or Nasocort: one spray each nostril twice daily OR two sprays each nostril once daily until nares dry out, unless you have Glaucoma.   Can also supplement with nasal saline rinse.    Sinus rinses DO NOT USE TAP WATER, if you must, water must be at a rolling boil for 1 minute, let it cool, then use.  May use distilled water, or over the counter nasal saline rinses.  Tommy's vapor rub in shower to help open nasal passages.  May use nasal gel to keep passages moisturized.  May use nasal saline sprays during the day for added relief of congestion.   For those who go to the gym, please do not use the sauna or steam room now to clear sinuses.    Cough     Rest and fluids are important  Can use honey with bruce to soothe your throat    Robitussin or Delsyum for cough suppressant for dry cough.    Mucinex DM or products containing Guaifenesin  "or Dextromethorphan for expectorant (wet cough).    -  If you have hypertension avoid using the "D" which is the decongestant.  Instead you can use Coricidin HBP for cold and cough symptoms.      Please follow up with your primary care doctor or specialist in the next 48-72hrs as needed and if no improvement    If you smoke, please stop smoking.    Lastly, good hand washing and cough hygiene (cough into your elbow) will help prevent the spread of the illness. A general rule is that you are no longer contagious once you have been without a fever for over 24 hours without requiring fever reducing medications.     Please arrange follow up with your primary medical clinic as soon as possible. You must understand that you've received an Urgent Care treatment only and that you may be released before all of your medical problems are known or treated. You, the patient, will arrange for follow up as instructed. If your symptoms worsen or fail to improve you should go to the Emergency Room.          "

## 2023-09-11 NOTE — PATIENT INSTRUCTIONS
You have tested negative for COVID on our Binax test. As a follow up the recommendation is if you have symptoms within the first 7 days to retest within 48 hours. If you have NO SYMPTONS then you should test every 48 hours two more times.    PLEASE READ YOUR DISCHARGE INSTRUCTIONS ENTIRELY AS IT CONTAINS IMPORTANT INFORMATION.    Please drink plenty of fluids.    Please get plenty of rest.    Please return here or go to the Emergency Department for any concerns or worsening of condition.    Please take an over the counter antihistamine medication (Allegra/Claritin/Zyrtec/xyzal) of your choice as directed for allergy symptoms and/or runny nose and postnasal drip.    Try an over the counter decongestant for sinus pressure/ear pressure, congestion symptoms like Mucinex D or Sudafed or Phenylephrine. You buy this behind the pharmacy counter.    If you do have Hypertension or palpitations, it is safe to take Coricidin HBP for relief of sinus symptoms.    Certain OTC cough and cold medications may raise your blood pressure. To keep your blood pressure regulated avoid over-the-counter decongestants and multisymptom cold remedies that contain decongestants -- such as pseudoephedrine, ephedrine, phenylephrine, naphazoline and oxymetazoline. Also, check the label for high sodium content, which can also raise blood pressure.       Tylenol or ibuprofen can also be used as directed for pain and fever unless you have an allergy to them or medical condition such as stomach ulcers, kidney or liver disease or blood thinners etc for which you should not be taking these type of medications.     SORE THROAT:    You may gargle with hot salt water 4 times a day for the next 2 days and then you may also gargle diluted hydrogen peroxide once to twice daily to alleviate some of your throat discomfort.  Drink plenty of fluids, recommend warm tea with honey.     YOU MAY USE OVER-THE-COUNTER CEPACOL FOR SOOTHING OF YOUR THROAT.  You may wish  "to avoid spicy food, citrus fruits, and red sauces- as this may irritate the throat more.        Use over the counter Flonase or Nasocort: one spray each nostril twice daily OR two sprays each nostril once daily until nares dry out, unless you have Glaucoma.   Can also supplement with nasal saline rinse.    Sinus rinses DO NOT USE TAP WATER, if you must, water must be at a rolling boil for 1 minute, let it cool, then use.  May use distilled water, or over the counter nasal saline rinses.  Tommy's vapor rub in shower to help open nasal passages.  May use nasal gel to keep passages moisturized.  May use nasal saline sprays during the day for added relief of congestion.   For those who go to the gym, please do not use the sauna or steam room now to clear sinuses.    Cough     Rest and fluids are important  Can use honey with bruce to soothe your throat    Robitussin or Delsyum for cough suppressant for dry cough.    Mucinex DM or products containing Guaifenesin or Dextromethorphan for expectorant (wet cough).    -  If you have hypertension avoid using the "D" which is the decongestant.  Instead you can use Coricidin HBP for cold and cough symptoms.      Please follow up with your primary care doctor or specialist in the next 48-72hrs as needed and if no improvement    If you smoke, please stop smoking.    Lastly, good hand washing and cough hygiene (cough into your elbow) will help prevent the spread of the illness. A general rule is that you are no longer contagious once you have been without a fever for over 24 hours without requiring fever reducing medications.     Please arrange follow up with your primary medical clinic as soon as possible. You must understand that you've received an Urgent Care treatment only and that you may be released before all of your medical problems are known or treated. You, the patient, will arrange for follow up as instructed. If your symptoms worsen or fail to improve you should go to " the Emergency Room.

## 2023-09-14 ENCOUNTER — TELEPHONE (OUTPATIENT)
Dept: URGENT CARE | Facility: CLINIC | Age: 68
End: 2023-09-14
Payer: MEDICARE

## 2023-09-20 ENCOUNTER — OFFICE VISIT (OUTPATIENT)
Dept: HEMATOLOGY/ONCOLOGY | Facility: CLINIC | Age: 68
End: 2023-09-20
Payer: MEDICARE

## 2023-09-20 ENCOUNTER — LAB VISIT (OUTPATIENT)
Dept: LAB | Facility: HOSPITAL | Age: 68
End: 2023-09-20
Attending: INTERNAL MEDICINE
Payer: MEDICARE

## 2023-09-20 VITALS
WEIGHT: 243.63 LBS | DIASTOLIC BLOOD PRESSURE: 69 MMHG | SYSTOLIC BLOOD PRESSURE: 119 MMHG | TEMPERATURE: 97 F | BODY MASS INDEX: 38.15 KG/M2 | HEART RATE: 77 BPM

## 2023-09-20 DIAGNOSIS — C56.3 OVARIAN CANCER, BILATERAL: ICD-10-CM

## 2023-09-20 DIAGNOSIS — R53.82 CHRONIC FATIGUE, UNSPECIFIED: ICD-10-CM

## 2023-09-20 DIAGNOSIS — C78.6 PERITONEAL CARCINOMATOSIS: ICD-10-CM

## 2023-09-20 DIAGNOSIS — R97.1 ELEVATED CANCER ANTIGEN 125 (CA 125): ICD-10-CM

## 2023-09-20 DIAGNOSIS — C78.6 PERITONEAL CARCINOMATOSIS: Primary | ICD-10-CM

## 2023-09-20 LAB
ALBUMIN SERPL BCP-MCNC: 4.1 G/DL (ref 3.5–5.2)
ALP SERPL-CCNC: 120 U/L (ref 55–135)
ALT SERPL W/O P-5'-P-CCNC: 21 U/L (ref 10–44)
ANION GAP SERPL CALC-SCNC: 11 MMOL/L (ref 8–16)
AST SERPL-CCNC: 23 U/L (ref 10–40)
BASOPHILS # BLD AUTO: 0.1 K/UL (ref 0–0.2)
BASOPHILS NFR BLD: 1.2 % (ref 0–1.9)
BILIRUB SERPL-MCNC: 0.6 MG/DL (ref 0.1–1)
BUN SERPL-MCNC: 16 MG/DL (ref 8–23)
CALCIUM SERPL-MCNC: 10.4 MG/DL (ref 8.7–10.5)
CANCER AG125 SERPL-ACNC: 64 U/ML (ref 0–30)
CHLORIDE SERPL-SCNC: 109 MMOL/L (ref 95–110)
CO2 SERPL-SCNC: 26 MMOL/L (ref 23–29)
CREAT SERPL-MCNC: 1 MG/DL (ref 0.5–1.4)
DIFFERENTIAL METHOD: ABNORMAL
EOSINOPHIL # BLD AUTO: 0.1 K/UL (ref 0–0.5)
EOSINOPHIL NFR BLD: 1.6 % (ref 0–8)
ERYTHROCYTE [DISTWIDTH] IN BLOOD BY AUTOMATED COUNT: 14.7 % (ref 11.5–14.5)
EST. GFR  (NO RACE VARIABLE): >60 ML/MIN/1.73 M^2
GLUCOSE SERPL-MCNC: 99 MG/DL (ref 70–110)
HCT VFR BLD AUTO: 40.2 % (ref 37–48.5)
HGB BLD-MCNC: 12.8 G/DL (ref 12–16)
IMM GRANULOCYTES # BLD AUTO: 0.01 K/UL (ref 0–0.04)
IMM GRANULOCYTES NFR BLD AUTO: 0.1 % (ref 0–0.5)
LDH SERPL L TO P-CCNC: 467 U/L (ref 110–260)
LYMPHOCYTES # BLD AUTO: 2.1 K/UL (ref 1–4.8)
LYMPHOCYTES NFR BLD: 25.6 % (ref 18–48)
MAGNESIUM SERPL-MCNC: 1.8 MG/DL (ref 1.6–2.6)
MCH RBC QN AUTO: 27.9 PG (ref 27–31)
MCHC RBC AUTO-ENTMCNC: 31.8 G/DL (ref 32–36)
MCV RBC AUTO: 88 FL (ref 82–98)
MONOCYTES # BLD AUTO: 0.4 K/UL (ref 0.3–1)
MONOCYTES NFR BLD: 4.8 % (ref 4–15)
NEUTROPHILS # BLD AUTO: 5.5 K/UL (ref 1.8–7.7)
NEUTROPHILS NFR BLD: 66.7 % (ref 38–73)
NRBC BLD-RTO: 0 /100 WBC
PLATELET # BLD AUTO: 246 K/UL (ref 150–450)
PMV BLD AUTO: 10.5 FL (ref 9.2–12.9)
POTASSIUM SERPL-SCNC: 4.5 MMOL/L (ref 3.5–5.1)
PROT SERPL-MCNC: 7.1 G/DL (ref 6–8.4)
RBC # BLD AUTO: 4.59 M/UL (ref 4–5.4)
SODIUM SERPL-SCNC: 146 MMOL/L (ref 136–145)
TSH SERPL DL<=0.005 MIU/L-ACNC: 2.46 UIU/ML (ref 0.4–4)
WBC # BLD AUTO: 8.29 K/UL (ref 3.9–12.7)

## 2023-09-20 PROCEDURE — 3078F DIAST BP <80 MM HG: CPT | Mod: CPTII,S$GLB,, | Performed by: INTERNAL MEDICINE

## 2023-09-20 PROCEDURE — 1159F PR MEDICATION LIST DOCUMENTED IN MEDICAL RECORD: ICD-10-PCS | Mod: CPTII,S$GLB,, | Performed by: INTERNAL MEDICINE

## 2023-09-20 PROCEDURE — 83735 ASSAY OF MAGNESIUM: CPT | Performed by: INTERNAL MEDICINE

## 2023-09-20 PROCEDURE — 36415 COLL VENOUS BLD VENIPUNCTURE: CPT | Performed by: INTERNAL MEDICINE

## 2023-09-20 PROCEDURE — 3288F FALL RISK ASSESSMENT DOCD: CPT | Mod: CPTII,S$GLB,, | Performed by: INTERNAL MEDICINE

## 2023-09-20 PROCEDURE — 1101F PR PT FALLS ASSESS DOC 0-1 FALLS W/OUT INJ PAST YR: ICD-10-PCS | Mod: CPTII,S$GLB,, | Performed by: INTERNAL MEDICINE

## 2023-09-20 PROCEDURE — 80053 COMPREHEN METABOLIC PANEL: CPT | Performed by: INTERNAL MEDICINE

## 2023-09-20 PROCEDURE — 86304 IMMUNOASSAY TUMOR CA 125: CPT | Performed by: INTERNAL MEDICINE

## 2023-09-20 PROCEDURE — 84443 ASSAY THYROID STIM HORMONE: CPT | Performed by: INTERNAL MEDICINE

## 2023-09-20 PROCEDURE — 80074 ACUTE HEPATITIS PANEL: CPT | Performed by: INTERNAL MEDICINE

## 2023-09-20 PROCEDURE — 99999 PR PBB SHADOW E&M-EST. PATIENT-LVL III: CPT | Mod: PBBFAC,,, | Performed by: INTERNAL MEDICINE

## 2023-09-20 PROCEDURE — 3074F SYST BP LT 130 MM HG: CPT | Mod: CPTII,S$GLB,, | Performed by: INTERNAL MEDICINE

## 2023-09-20 PROCEDURE — 3074F PR MOST RECENT SYSTOLIC BLOOD PRESSURE < 130 MM HG: ICD-10-PCS | Mod: CPTII,S$GLB,, | Performed by: INTERNAL MEDICINE

## 2023-09-20 PROCEDURE — 87389 HIV-1 AG W/HIV-1&-2 AB AG IA: CPT | Performed by: INTERNAL MEDICINE

## 2023-09-20 PROCEDURE — 3044F PR MOST RECENT HEMOGLOBIN A1C LEVEL <7.0%: ICD-10-PCS | Mod: CPTII,S$GLB,, | Performed by: INTERNAL MEDICINE

## 2023-09-20 PROCEDURE — 3044F HG A1C LEVEL LT 7.0%: CPT | Mod: CPTII,S$GLB,, | Performed by: INTERNAL MEDICINE

## 2023-09-20 PROCEDURE — 3008F PR BODY MASS INDEX (BMI) DOCUMENTED: ICD-10-PCS | Mod: CPTII,S$GLB,, | Performed by: INTERNAL MEDICINE

## 2023-09-20 PROCEDURE — 1160F RVW MEDS BY RX/DR IN RCRD: CPT | Mod: CPTII,S$GLB,, | Performed by: INTERNAL MEDICINE

## 2023-09-20 PROCEDURE — 1160F PR REVIEW ALL MEDS BY PRESCRIBER/CLIN PHARMACIST DOCUMENTED: ICD-10-PCS | Mod: CPTII,S$GLB,, | Performed by: INTERNAL MEDICINE

## 2023-09-20 PROCEDURE — 3078F PR MOST RECENT DIASTOLIC BLOOD PRESSURE < 80 MM HG: ICD-10-PCS | Mod: CPTII,S$GLB,, | Performed by: INTERNAL MEDICINE

## 2023-09-20 PROCEDURE — 1125F AMNT PAIN NOTED PAIN PRSNT: CPT | Mod: CPTII,S$GLB,, | Performed by: INTERNAL MEDICINE

## 2023-09-20 PROCEDURE — 3008F BODY MASS INDEX DOCD: CPT | Mod: CPTII,S$GLB,, | Performed by: INTERNAL MEDICINE

## 2023-09-20 PROCEDURE — 83615 LACTATE (LD) (LDH) ENZYME: CPT | Performed by: INTERNAL MEDICINE

## 2023-09-20 PROCEDURE — 85025 COMPLETE CBC W/AUTO DIFF WBC: CPT | Performed by: INTERNAL MEDICINE

## 2023-09-20 PROCEDURE — 3288F PR FALLS RISK ASSESSMENT DOCUMENTED: ICD-10-PCS | Mod: CPTII,S$GLB,, | Performed by: INTERNAL MEDICINE

## 2023-09-20 PROCEDURE — 99214 PR OFFICE/OUTPT VISIT, EST, LEVL IV, 30-39 MIN: ICD-10-PCS | Mod: S$GLB,,, | Performed by: INTERNAL MEDICINE

## 2023-09-20 PROCEDURE — 1159F MED LIST DOCD IN RCRD: CPT | Mod: CPTII,S$GLB,, | Performed by: INTERNAL MEDICINE

## 2023-09-20 PROCEDURE — 1125F PR PAIN SEVERITY QUANTIFIED, PAIN PRESENT: ICD-10-PCS | Mod: CPTII,S$GLB,, | Performed by: INTERNAL MEDICINE

## 2023-09-20 PROCEDURE — 86704 HEP B CORE ANTIBODY TOTAL: CPT | Performed by: INTERNAL MEDICINE

## 2023-09-20 PROCEDURE — 1101F PT FALLS ASSESS-DOCD LE1/YR: CPT | Mod: CPTII,S$GLB,, | Performed by: INTERNAL MEDICINE

## 2023-09-20 PROCEDURE — 99999 PR PBB SHADOW E&M-EST. PATIENT-LVL III: ICD-10-PCS | Mod: PBBFAC,,, | Performed by: INTERNAL MEDICINE

## 2023-09-20 PROCEDURE — 99214 OFFICE O/P EST MOD 30 MIN: CPT | Mod: S$GLB,,, | Performed by: INTERNAL MEDICINE

## 2023-09-20 RX ORDER — DEXAMETHASONE 4 MG/1
8 TABLET ORAL DAILY
Qty: 6 TABLET | Refills: 11 | Status: SHIPPED | OUTPATIENT
Start: 2023-09-21

## 2023-09-20 RX ORDER — OLANZAPINE 5 MG/1
5 TABLET ORAL NIGHTLY
Qty: 4 TABLET | Refills: 11 | Status: SHIPPED | OUTPATIENT
Start: 2023-09-20

## 2023-09-20 RX ORDER — PROCHLORPERAZINE MALEATE 5 MG
5 TABLET ORAL EVERY 6 HOURS PRN
Qty: 30 TABLET | Refills: 11 | Status: SHIPPED | OUTPATIENT
Start: 2023-09-20 | End: 2023-10-06

## 2023-09-21 LAB
HAV IGM SERPL QL IA: NORMAL
HBV CORE AB SERPL QL IA: NORMAL
HBV CORE IGM SERPL QL IA: NORMAL
HBV SURFACE AG SERPL QL IA: NORMAL
HCV AB SERPL QL IA: NORMAL
HIV 1+2 AB+HIV1 P24 AG SERPL QL IA: NORMAL

## 2023-09-27 NOTE — PROGRESS NOTES
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  female with past medical history significant for hypertension, CHF here for follow up of ovarian cancer    Pt was diagnosed with peritoneal carcinomatosis and malignant right pleural effusion consistent with ovarian primary with CA-125 of 2740. PET scan demonstrated peritoneal carcinomatosis & extensive lymphadenopathy.  She was tried treated with neoadjuvant chemotherapy with carboplatin/Taxol/Avastin and had a right ureter stent placed due to postrenal obstruction from tumor. On 8/15/2019 underwent salpingoophorectomy. Pathology showed CR.  Patient was then treated with maintenance Avastin and later on discontinued and September of 2020.    She was noted on surveillance CT scan to have disease recurrence on 08/18/2023 and started on carboplatin and paclitaxel as platinum sensitive disease by gyn Oncology.     Interval History:     Patient here to be started on carboplatin and paclitaxel for platinum sensitive disease tomorrow. Patient is complaining of mild intermittent abdominal discomfort over the last 1 month.  Denies any associated nausea, vomiting, diarrhea, constipation.  She is also complaining of some discomfort in her right shoulder region and hiccups    Oncology History   Peritoneal carcinomatosis   1/26/2019 Initial Diagnosis    Peritoneal carcinomatosis     2/15/2019 - 7/8/2019  Chemotherapy    Treatment Summary   Plan Name: OP GYN PACLITAXEL CARBOPLATIN (AUC 6) Q3W  Treatment Goal: Palliative  Status: Inactive  Start Date: 2/28/2019  End Date: 6/18/2019  Provider: Will Trevizo Jr., MD  Chemotherapy: bevacizumab (AVASTIN) 15 mg/kg = 1,600 mg in sodium chloride 0.9% 100 mL chemo infusion, 15 mg/kg = 1,600 mg (100 % of original dose 15 mg/kg), Intravenous, Clinic/HOD 1 time, 6 of 6 cycles  Dose modification: 15 mg/kg (original dose 15 mg/kg, Cycle 1)  Administration: 1,600 mg (2/28/2019), 1,600 mg (3/21/2019), 1,600 mg (4/11/2019), 1,600 mg (5/7/2019), 1,600 mg (5/28/2019), 1,510 mg (6/18/2019)  CARBOplatin (PARAPLATIN) 870 mg in sodium chloride 0.9% 337 mL chemo infusion, 870 mg (100 % of original dose 868.8 mg), Intravenous, Clinic/HOD 1 time, 6 of 6 cycles  Dose modification:   (original dose 868.8 mg, Cycle 1)  Administration: 870 mg (2/28/2019), 870 mg (3/21/2019), 900 mg (4/11/2019), 870 mg (5/7/2019), 660 mg (5/28/2019), 690 mg (6/18/2019)  PACLitaxel (TAXOL) 175 mg/m2 = 396 mg in sodium chloride 0.9% 566 mL chemo infusion, 175 mg/m2 = 396 mg, Intravenous, Clinic/H[facility] 1 time, 6 of 6 cycles  Dose modification: 140 mg/m2 (80 % of original dose 175 mg/m2, Cycle 5)  Administration: 396 mg (2/28/2019), 396 mg (3/21/2019), 396 mg (4/11/2019), 396 mg (5/7/2019), 318 mg (5/28/2019), 306 mg (6/18/2019)     10/9/2019 - 9/24/2020 Chemotherapy    Treatment Summary   Plan Name: OP BEVACIZUMAB Q3W   Treatment Goal: Maintenance  Status: Inactive  Start Date: 10/9/2019  End Date: 9/24/2020  Provider: Oscar Mckeon MD  Chemotherapy: dexAMETHasone tablet 8 mg, 8 mg (100 % of original dose 8 mg), Oral, Clinic/H[facility] 1 time, 2 of 8 cycles  Dose modification: 8 mg (original dose 8 mg, Cycle 8), 8 mg (original dose 8 mg, Cycle 12)  Administration: 8 mg (7/22/2020), 8 mg (8/13/2020)  bevacizumab (AVASTIN) 15 mg/kg = 1,615 mg in sodium chloride 0.9% 100 mL chemo infusion, 15 mg/kg = 1,615 mg, Intravenous,  Clinic/HOD 1 time, 11 of 17 cycles  Administration: 1,615 mg (10/9/2019), 1,615 mg (10/29/2019), 1,615 mg (12/10/2019), 1,615 mg (1/21/2020), 1,600 mg (4/2/2020), 1,615 mg (4/23/2020), 1,600 mg (7/1/2020), 1,600 mg (7/22/2020), 1,600 mg (8/13/2020), 1,795 mg (9/3/2020), 1,795 mg (9/24/2020)     9/20/2023 -  Chemotherapy    Treatment Summary   Plan Name: OP GYN PACLITAXEL CARBOPLATIN (AUC 6) Q3W  Treatment Goal: Control  Status: Active  Start Date: 9/20/2023 (Planned)  End Date: 8/21/2024 (Planned)  Provider: Ismael Juarez MD  Chemotherapy: CARBOplatin (PARAPLATIN) in sodium chloride 0.9% 250 mL chemo infusion, , Intravenous, Clinic/HOD 1 time, 0 of 17 cycles  PACLitaxeL (TAXOL) 175 mg/m2 in sodium chloride 0.9% chemo infusion, 175 mg/m2, Intravenous, Clinic/HOD 1 time, 0 of 17 cycles     Malignant neoplasm of right ovary (Resolved)   2/15/2019 Initial Diagnosis    Malignant neoplasm of right ovary     2/15/2019 - 7/8/2019 Chemotherapy    Treatment Summary   Plan Name: OP GYN PACLITAXEL CARBOPLATIN (AUC 6) Q3W  Treatment Goal: Palliative  Status: Inactive  Start Date: 2/28/2019  End Date: 6/18/2019  Provider: Will Trevizo Jr., MD  Chemotherapy: bevacizumab (AVASTIN) 15 mg/kg = 1,600 mg in sodium chloride 0.9% 100 mL chemo infusion, 15 mg/kg = 1,600 mg (100 % of original dose 15 mg/kg), Intravenous, Clinic/HOD 1 time, 6 of 6 cycles  Dose modification: 15 mg/kg (original dose 15 mg/kg, Cycle 1)  Administration: 1,600 mg (2/28/2019), 1,600 mg (3/21/2019), 1,600 mg (4/11/2019), 1,600 mg (5/7/2019), 1,600 mg (5/28/2019), 1,510 mg (6/18/2019)  CARBOplatin (PARAPLATIN) 870 mg in sodium chloride 0.9% 337 mL chemo infusion, 870 mg (100 % of original dose 868.8 mg), Intravenous, Clinic/[facility] 1 time, 6 of 6 cycles  Dose modification:   (original dose 868.8 mg, Cycle 1)  Administration: 870 mg (2/28/2019), 870 mg (3/21/2019), 900 mg (4/11/2019), 870 mg (5/7/2019), 660 mg (5/28/2019), 690 mg (6/18/2019)  PACLitaxel (TAXOL) 175  mg/m2 = 396 mg in sodium chloride 0.9% 566 mL chemo infusion, 175 mg/m2 = 396 mg, Intravenous, Clinic/HOD 1 time, 6 of 6 cycles  Dose modification: 140 mg/m2 (80 % of original dose 175 mg/m2, Cycle 5)  Administration: 396 mg (2/28/2019), 396 mg (3/21/2019), 396 mg (4/11/2019), 396 mg (5/7/2019), 318 mg (5/28/2019), 306 mg (6/18/2019)     10/9/2019 - 9/24/2020 Chemotherapy    Treatment Summary   Plan Name: OP BEVACIZUMAB Q3W   Treatment Goal: Maintenance  Status: Inactive  Start Date: 10/9/2019  End Date: 9/24/2020  Provider: Oscar Mckeon MD  Chemotherapy: dexAMETHasone tablet 8 mg, 8 mg (100 % of original dose 8 mg), Oral, Clinic/HOD 1 time, 2 of 8 cycles  Dose modification: 8 mg (original dose 8 mg, Cycle 8), 8 mg (original dose 8 mg, Cycle 12)  Administration: 8 mg (7/22/2020), 8 mg (8/13/2020)  bevacizumab (AVASTIN) 15 mg/kg = 1,615 mg in sodium chloride 0.9% 100 mL chemo infusion, 15 mg/kg = 1,615 mg, Intravenous, Clinic/HOD 1 time, 11 of 17 cycles  Administration: 1,615 mg (10/9/2019), 1,615 mg (10/29/2019), 1,615 mg (12/10/2019), 1,615 mg (1/21/2020), 1,600 mg (4/2/2020), 1,615 mg (4/23/2020), 1,600 mg (7/1/2020), 1,600 mg (7/22/2020), 1,600 mg (8/13/2020), 1,795 mg (9/3/2020), 1,795 mg (9/24/2020)     Malignant neoplasm of both ovaries (Resolved)   2/18/2019 Initial Diagnosis    Ovarian cancer     10/9/2019 - 9/24/2020 Chemotherapy    Treatment Summary   Plan Name: OP BEVACIZUMAB Q3W   Treatment Goal: Maintenance  Status: Inactive  Start Date: 10/9/2019  End Date: 9/24/2020  Provider: Oscar Mckeon MD  Chemotherapy: dexAMETHasone tablet 8 mg, 8 mg (100 % of original dose 8 mg), Oral, Clinic/HOD 1 time, 2 of 8 cycles  Dose modification: 8 mg (original dose 8 mg, Cycle 8), 8 mg (original dose 8 mg, Cycle 12)  Administration: 8 mg (7/22/2020), 8 mg (8/13/2020)  bevacizumab (AVASTIN) 15 mg/kg = 1,615 mg in sodium chloride 0.9% 100 mL chemo infusion, 15 mg/kg = 1,615 mg, Intravenous, Clinic/HOD 1 time, 11 of 17  cycles  Administration: 1,615 mg (10/9/2019), 1,615 mg (10/29/2019), 1,615 mg (12/10/2019), 1,615 mg (1/21/2020), 1,600 mg (4/2/2020), 1,615 mg (4/23/2020), 1,600 mg (7/1/2020), 1,600 mg (7/22/2020), 1,600 mg (8/13/2020), 1,795 mg (9/3/2020), 1,795 mg (9/24/2020)     Ovarian cancer, bilateral   8/15/2019 Initial Diagnosis    Ovarian cancer, bilateral     10/9/2019 - 9/24/2020 Chemotherapy    Treatment Summary   Plan Name: OP BEVACIZUMAB Q3W   Treatment Goal: Maintenance  Status: Inactive  Start Date: 10/9/2019  End Date: 9/24/2020  Provider: Oscar Mckeon MD  Chemotherapy: dexAMETHasone tablet 8 mg, 8 mg (100 % of original dose 8 mg), Oral, Clinic/HOD 1 time, 2 of 8 cycles  Dose modification: 8 mg (original dose 8 mg, Cycle 8), 8 mg (original dose 8 mg, Cycle 12)  Administration: 8 mg (7/22/2020), 8 mg (8/13/2020)  bevacizumab (AVASTIN) 15 mg/kg = 1,615 mg in sodium chloride 0.9% 100 mL chemo infusion, 15 mg/kg = 1,615 mg, Intravenous, Clinic/HOD 1 time, 11 of 17 cycles  Administration: 1,615 mg (10/9/2019), 1,615 mg (10/29/2019), 1,615 mg (12/10/2019), 1,615 mg (1/21/2020), 1,600 mg (4/2/2020), 1,615 mg (4/23/2020), 1,600 mg (7/1/2020), 1,600 mg (7/22/2020), 1,600 mg (8/13/2020), 1,795 mg (9/3/2020), 1,795 mg (9/24/2020)     9/20/2023 -  Chemotherapy    Treatment Summary   Plan Name: OP GYN PACLITAXEL CARBOPLATIN (AUC 6) Q3W  Treatment Goal: Control  Status: Active  Start Date: 9/20/2023 (Planned)  End Date: 8/21/2024 (Planned)  Provider: Ismael Juarez MD  Chemotherapy: CARBOplatin (PARAPLATIN) in sodium chloride 0.9% 250 mL chemo infusion, , Intravenous, Clinic/HOD 1 time, 0 of 17 cycles  PACLitaxeL (TAXOL) 175 mg/m2 in sodium chloride 0.9% chemo infusion, 175 mg/m2, Intravenous, Clinic/HOD 1 time, 0 of 17 cycles         Past Medical History:   Diagnosis Date    Anemia     Anxiety     Arthritis     knees    Cancer     ovarian    CHF (congestive heart failure)     Hx antineoplastic chemotherapy     last 6/2019     Hyperlipemia     Hypertension     Neck pain     Ovarian cancer     CHEMO    Peritoneal carcinomatosis 2019      Past Surgical History:   Procedure Laterality Date    breast reduction  10/02/2018    CATARACT EXTRACTION Bilateral     2023     SECTION      X 1    COLONOSCOPY N/A 2021    Procedure: COLONOSCOPY;  Surgeon: Paulette Rojas MD;  Location: [facility] ENDO;  Service: Gastroenterology;  Laterality: N/A;    CYSTOSCOPY W/ URETERAL STENT PLACEMENT Right 2019    Procedure: CYSTOSCOPY, WITH URETERAL STENT INSERTION;  Surgeon: Timmy Santiago IV, MD;  Location: [facility] OR;  Service: Urology;  Laterality: Right;    CYSTOSCOPY W/ URETERAL STENT REMOVAL  10/04/2019    DILATION AND CURETTAGE OF UTERUS      HYSTERECTOMY      RALH for fibroids (still has ovaries)    INSERTION OF VENOUS ACCESS PORT Left 2019    Procedure: INSERTION, VENOUS ACCESS PORT;  Surgeon: Ulisses Monzon MD;  Location: [facility] OR;  Service: General;  Laterality: Left;  Left internal jugular     LYSIS OF ADHESIONS OF URETER N/A 08/15/2019    Procedure: URETEROLYSIS;  Surgeon: Ismael Juarez MD;  Location: [facility] OR;  Service: OB/GYN;  Laterality: N/A;    OMENTECTOMY N/A 08/15/2019    Procedure: OMENTECTOMY;  Surgeon: Ismael Juarez MD;  Location: [facility] OR;  Service: OB/GYN;  Laterality: N/A;    RETROGRADE PYELOGRAPHY Right 2019    Procedure: PYELOGRAM, RETROGRADE;  Surgeon: Timmy Santiago IV, MD;  Location: [facility] OR;  Service: Urology;  Laterality: Right;    ROBOT-ASSISTED LAPAROSCOPIC SALPINGO-OOPHORECTOMY USING DA TIA XI Bilateral 08/15/2019    Procedure: XI ROBOTIC SALPINGO-OOPHORECTOMY;  Surgeon: Ismael Juarez MD;  Location: [facility] OR;  Service: OB/GYN;  Laterality: Bilateral;    TOTAL REDUCTION MAMMOPLASTY  2018    TUBAL LIGATION       Social History     Socioeconomic History    Marital status:    Tobacco Use    Smoking status: Former     Current packs/day: 0.00     Average packs/day: 1 pack/day  for 25.0 years (25.0 ttl pk-yrs)     Types: Cigarettes     Start date: 10/1/1993     Quit date: 10/1/2018     Years since quittin.9    Smokeless tobacco: Never    Tobacco comments:     States started quit 2 months ago after 30 years   Substance and Sexual Activity    Alcohol use: Yes     Comment: occasionally  No alcohol 72h prior to sx    Drug use: No    Sexual activity: Not Currently     Partners: Male     Comment: hyst; mut monog   Social History Narrative    Live w/ spouse and 2 dogs      Social Determinants of Health     Stress: No Stress Concern Present (2019)    [facility] [place] [facility] - Occupational Stress Questionnaire     Feeling of Stress : Only a little      Family History   Problem Relation Age of Onset    Glaucoma Mother     Colon cancer Brother     Breast cancer Maternal Aunt     Breast cancer Paternal Aunt     Ovarian cancer Paternal Aunt     Anesthesia problems Other     Thrombophilia Neg Hx           Review of patient's allergies indicates:  No Known Allergies     Review of Systems   Constitutional:  Positive for activity change and fatigue. Negative for chills and fever.   HENT: Negative.     Eyes: Negative.    Respiratory:  Negative for cough and shortness of breath.    Cardiovascular:  Negative for chest pain and leg swelling.   Gastrointestinal:  Positive for abdominal pain. Negative for constipation, diarrhea, nausea and vomiting.   Endocrine: Negative.    Genitourinary: Negative.    Musculoskeletal:  Negative for arthralgias and myalgias.   Integumentary:  Negative.   Allergic/Immunologic: Negative.    Neurological:  Negative for light-headedness, numbness and headaches.   Hematological: Negative.    Psychiatric/Behavioral: Negative.           Objective:        Vitals:    23 1347   BP: 113/68   Pulse: 65   Temp: 97.9 °F (36.6 °C)          Physical Exam  Constitutional:       General: She is not in acute distress.     Appearance: Normal appearance. She is not  ill-appearing.   HENT:      Head: Normocephalic.      Mouth/Throat:      Mouth: Mucous membranes are moist.   Eyes:      Extraocular Movements: Extraocular movements intact.   Cardiovascular:      Rate and Rhythm: Normal rate and regular rhythm.   Pulmonary:      Effort: Pulmonary effort is normal. No respiratory distress.   Abdominal:      Tenderness: There is abdominal tenderness.   Musculoskeletal:         General: No swelling. Normal range of motion.   Skin:     General: Skin is warm.   Neurological:      Mental Status: She is alert and oriented to person, place, and time. Mental status is at baseline.   Psychiatric:         Mood and Affect: Mood normal.         Behavior: Behavior normal.         Thought Content: Thought content normal.         Judgment: Judgment normal.           LABS / IMAGING      - 08/21/2023 CT CAP: Marked interval disease progression with small right pleural effusion and severe peritoneal carcinomatosis. There is a large left lower quadrant parietal peritoneal implant measuring 3.4 x 1.9 cm. There are even nodular implants seen within the ventral abdominal wall small hernias. The left lower quadrant metastatic implant measures 4.0 x 5.6 x 4.7 cm (prior 1.3 x 1.0 x 1.2 cm). New left retroperitoneal nodule at the level of the aortic bifurcation measuring 1.7 x 1.5 cm.          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  Patient was then treated with Avastin maintenance  - 08/21/2023 CT CAP: Marked interval disease progression with severe peritoneal carcinomatosis as detailed above.  - : 7 >> 21 >> 32 >> 64 (9/2023)      Plan:     - Patient with recurrent platinum sensitive stage IV ovarian cancer will be started on carboplatin and paclitaxel  - MD / LABS - 1 WEEK for tox  check   - MD/LABS/TREATMENT - 3 WEEKS         Med Onc Chart Routing      Follow up with physician 3 weeks.   Follow up with [facility] 1 week. TOX CHECK   Infusion scheduling note    Injection scheduling note    Labs CBC, CMP and magnesium   Scheduling:  Preferred lab:  Lab interval:  Labs before next visit   Imaging    Pharmacy appointment    Other referrals                    The patient was seen, interviewed and examined. Pertinent lab and radiology studies were reviewed. Pt instructed to call should develop concerning signs/symptoms or have further questions.       Portions of the record may have been created with voice recognition software. Occasional wrong-word or sound-a-like substitutions may have occurred due to the inherent limitations of voice recognition software. Read the chart carefully and recognize, using context, where substitutions have occurred.    Orlando Rangel MD  Hematology / Oncology

## 2023-09-28 ENCOUNTER — OFFICE VISIT (OUTPATIENT)
Dept: HEMATOLOGY/ONCOLOGY | Facility: CLINIC | Age: 68
End: 2023-09-28
Payer: MEDICARE

## 2023-09-28 ENCOUNTER — LAB VISIT (OUTPATIENT)
Dept: LAB | Facility: HOSPITAL | Age: 68
End: 2023-09-28
Attending: INTERNAL MEDICINE
Payer: MEDICARE

## 2023-09-28 VITALS
DIASTOLIC BLOOD PRESSURE: 68 MMHG | BODY MASS INDEX: 38.24 KG/M2 | WEIGHT: 244.19 LBS | TEMPERATURE: 98 F | SYSTOLIC BLOOD PRESSURE: 113 MMHG | HEART RATE: 65 BPM

## 2023-09-28 DIAGNOSIS — D84.821 IMMUNODEFICIENCY DUE TO CHEMOTHERAPY: ICD-10-CM

## 2023-09-28 DIAGNOSIS — C78.6 PERITONEAL CARCINOMATOSIS: ICD-10-CM

## 2023-09-28 DIAGNOSIS — Z79.899 IMMUNODEFICIENCY DUE TO CHEMOTHERAPY: ICD-10-CM

## 2023-09-28 DIAGNOSIS — C78.6 PERITONEAL CARCINOMATOSIS: Primary | ICD-10-CM

## 2023-09-28 DIAGNOSIS — C56.3 OVARIAN CANCER, BILATERAL: ICD-10-CM

## 2023-09-28 DIAGNOSIS — T45.1X5A IMMUNODEFICIENCY DUE TO CHEMOTHERAPY: ICD-10-CM

## 2023-09-28 DIAGNOSIS — Z51.11 ENCOUNTER FOR ANTINEOPLASTIC CHEMOTHERAPY: ICD-10-CM

## 2023-09-28 LAB
ALBUMIN SERPL BCP-MCNC: 3.8 G/DL (ref 3.5–5.2)
ALP SERPL-CCNC: 116 U/L (ref 55–135)
ALT SERPL W/O P-5'-P-CCNC: 21 U/L (ref 10–44)
ANION GAP SERPL CALC-SCNC: 9 MMOL/L (ref 8–16)
AST SERPL-CCNC: 23 U/L (ref 10–40)
BASOPHILS # BLD AUTO: 0.09 K/UL (ref 0–0.2)
BASOPHILS NFR BLD: 1.2 % (ref 0–1.9)
BILIRUB SERPL-MCNC: 0.4 MG/DL (ref 0.1–1)
BUN SERPL-MCNC: 13 MG/DL (ref 8–23)
CALCIUM SERPL-MCNC: 9.9 MG/DL (ref 8.7–10.5)
CHLORIDE SERPL-SCNC: 108 MMOL/L (ref 95–110)
CO2 SERPL-SCNC: 26 MMOL/L (ref 23–29)
CREAT SERPL-MCNC: 0.9 MG/DL (ref 0.5–1.4)
DIFFERENTIAL METHOD: ABNORMAL
EOSINOPHIL # BLD AUTO: 0.2 K/UL (ref 0–0.5)
EOSINOPHIL NFR BLD: 2.3 % (ref 0–8)
ERYTHROCYTE [DISTWIDTH] IN BLOOD BY AUTOMATED COUNT: 14.5 % (ref 11.5–14.5)
EST. GFR  (NO RACE VARIABLE): >60 ML/MIN/1.73 M^2
GLUCOSE SERPL-MCNC: 123 MG/DL (ref 70–110)
HCT VFR BLD AUTO: 37.1 % (ref 37–48.5)
HGB BLD-MCNC: 11.7 G/DL (ref 12–16)
IMM GRANULOCYTES # BLD AUTO: 0.01 K/UL (ref 0–0.04)
IMM GRANULOCYTES NFR BLD AUTO: 0.1 % (ref 0–0.5)
LYMPHOCYTES # BLD AUTO: 2.6 K/UL (ref 1–4.8)
LYMPHOCYTES NFR BLD: 33 % (ref 18–48)
MAGNESIUM SERPL-MCNC: 1.7 MG/DL (ref 1.6–2.6)
MCH RBC QN AUTO: 27.9 PG (ref 27–31)
MCHC RBC AUTO-ENTMCNC: 31.5 G/DL (ref 32–36)
MCV RBC AUTO: 88 FL (ref 82–98)
MONOCYTES # BLD AUTO: 0.4 K/UL (ref 0.3–1)
MONOCYTES NFR BLD: 5.7 % (ref 4–15)
NEUTROPHILS # BLD AUTO: 4.5 K/UL (ref 1.8–7.7)
NEUTROPHILS NFR BLD: 57.7 % (ref 38–73)
NRBC BLD-RTO: 0 /100 WBC
PLATELET # BLD AUTO: 268 K/UL (ref 150–450)
PMV BLD AUTO: 10.4 FL (ref 9.2–12.9)
POTASSIUM SERPL-SCNC: 3.7 MMOL/L (ref 3.5–5.1)
PROT SERPL-MCNC: 6.7 G/DL (ref 6–8.4)
RBC # BLD AUTO: 4.2 M/UL (ref 4–5.4)
SODIUM SERPL-SCNC: 143 MMOL/L (ref 136–145)
WBC # BLD AUTO: 7.76 K/UL (ref 3.9–12.7)

## 2023-09-28 PROCEDURE — 3008F PR BODY MASS INDEX (BMI) DOCUMENTED: ICD-10-PCS | Mod: CPTII,S$GLB,, | Performed by: INTERNAL MEDICINE

## 2023-09-28 PROCEDURE — 83735 ASSAY OF MAGNESIUM: CPT | Performed by: INTERNAL MEDICINE

## 2023-09-28 PROCEDURE — 1101F PT FALLS ASSESS-DOCD LE1/YR: CPT | Mod: CPTII,S$GLB,, | Performed by: INTERNAL MEDICINE

## 2023-09-28 PROCEDURE — 3074F PR MOST RECENT SYSTOLIC BLOOD PRESSURE < 130 MM HG: ICD-10-PCS | Mod: CPTII,S$GLB,, | Performed by: INTERNAL MEDICINE

## 2023-09-28 PROCEDURE — 3288F PR FALLS RISK ASSESSMENT DOCUMENTED: ICD-10-PCS | Mod: CPTII,S$GLB,, | Performed by: INTERNAL MEDICINE

## 2023-09-28 PROCEDURE — 36415 COLL VENOUS BLD VENIPUNCTURE: CPT | Performed by: INTERNAL MEDICINE

## 2023-09-28 PROCEDURE — 1159F MED LIST DOCD IN RCRD: CPT | Mod: CPTII,S$GLB,, | Performed by: INTERNAL MEDICINE

## 2023-09-28 PROCEDURE — 1126F AMNT PAIN NOTED NONE PRSNT: CPT | Mod: CPTII,S$GLB,, | Performed by: INTERNAL MEDICINE

## 2023-09-28 PROCEDURE — 3044F HG A1C LEVEL LT 7.0%: CPT | Mod: CPTII,S$GLB,, | Performed by: INTERNAL MEDICINE

## 2023-09-28 PROCEDURE — 3074F SYST BP LT 130 MM HG: CPT | Mod: CPTII,S$GLB,, | Performed by: INTERNAL MEDICINE

## 2023-09-28 PROCEDURE — 1126F PR PAIN SEVERITY QUANTIFIED, NO PAIN PRESENT: ICD-10-PCS | Mod: CPTII,S$GLB,, | Performed by: INTERNAL MEDICINE

## 2023-09-28 PROCEDURE — 99215 PR OFFICE/OUTPT VISIT, EST, LEVL V, 40-54 MIN: ICD-10-PCS | Mod: S$GLB,,, | Performed by: INTERNAL MEDICINE

## 2023-09-28 PROCEDURE — 85025 COMPLETE CBC W/AUTO DIFF WBC: CPT | Performed by: INTERNAL MEDICINE

## 2023-09-28 PROCEDURE — 3078F DIAST BP <80 MM HG: CPT | Mod: CPTII,S$GLB,, | Performed by: INTERNAL MEDICINE

## 2023-09-28 PROCEDURE — 3288F FALL RISK ASSESSMENT DOCD: CPT | Mod: CPTII,S$GLB,, | Performed by: INTERNAL MEDICINE

## 2023-09-28 PROCEDURE — 1160F RVW MEDS BY RX/DR IN RCRD: CPT | Mod: CPTII,S$GLB,, | Performed by: INTERNAL MEDICINE

## 2023-09-28 PROCEDURE — 80053 COMPREHEN METABOLIC PANEL: CPT | Performed by: INTERNAL MEDICINE

## 2023-09-28 PROCEDURE — 99999 PR PBB SHADOW E&M-EST. PATIENT-LVL III: CPT | Mod: PBBFAC,,, | Performed by: INTERNAL MEDICINE

## 2023-09-28 PROCEDURE — 3078F PR MOST RECENT DIASTOLIC BLOOD PRESSURE < 80 MM HG: ICD-10-PCS | Mod: CPTII,S$GLB,, | Performed by: INTERNAL MEDICINE

## 2023-09-28 PROCEDURE — 99215 OFFICE O/P EST HI 40 MIN: CPT | Mod: S$GLB,,, | Performed by: INTERNAL MEDICINE

## 2023-09-28 PROCEDURE — 99999 PR PBB SHADOW E&M-EST. PATIENT-LVL III: ICD-10-PCS | Mod: PBBFAC,,, | Performed by: INTERNAL MEDICINE

## 2023-09-28 PROCEDURE — 3008F BODY MASS INDEX DOCD: CPT | Mod: CPTII,S$GLB,, | Performed by: INTERNAL MEDICINE

## 2023-09-28 PROCEDURE — 1159F PR MEDICATION LIST DOCUMENTED IN MEDICAL RECORD: ICD-10-PCS | Mod: CPTII,S$GLB,, | Performed by: INTERNAL MEDICINE

## 2023-09-28 PROCEDURE — 3044F PR MOST RECENT HEMOGLOBIN A1C LEVEL <7.0%: ICD-10-PCS | Mod: CPTII,S$GLB,, | Performed by: INTERNAL MEDICINE

## 2023-09-28 PROCEDURE — 1101F PR PT FALLS ASSESS DOC 0-1 FALLS W/OUT INJ PAST YR: ICD-10-PCS | Mod: CPTII,S$GLB,, | Performed by: INTERNAL MEDICINE

## 2023-09-28 PROCEDURE — 1160F PR REVIEW ALL MEDS BY PRESCRIBER/CLIN PHARMACIST DOCUMENTED: ICD-10-PCS | Mod: CPTII,S$GLB,, | Performed by: INTERNAL MEDICINE

## 2023-09-28 RX ORDER — EPINEPHRINE 0.3 MG/.3ML
0.3 INJECTION SUBCUTANEOUS ONCE AS NEEDED
Status: CANCELLED | OUTPATIENT
Start: 2023-09-29

## 2023-09-28 RX ORDER — HEPARIN 100 UNIT/ML
500 SYRINGE INTRAVENOUS
Status: CANCELLED | OUTPATIENT
Start: 2023-09-29

## 2023-09-28 RX ORDER — FAMOTIDINE 10 MG/ML
20 INJECTION INTRAVENOUS
Status: CANCELLED | OUTPATIENT
Start: 2023-09-29

## 2023-09-28 RX ORDER — DIPHENHYDRAMINE HYDROCHLORIDE 50 MG/ML
50 INJECTION INTRAMUSCULAR; INTRAVENOUS ONCE AS NEEDED
Status: CANCELLED | OUTPATIENT
Start: 2023-09-29

## 2023-09-28 RX ORDER — PROCHLORPERAZINE EDISYLATE 5 MG/ML
5 INJECTION INTRAMUSCULAR; INTRAVENOUS ONCE AS NEEDED
Status: CANCELLED
Start: 2023-09-29

## 2023-09-28 RX ORDER — SODIUM CHLORIDE 0.9 % (FLUSH) 0.9 %
10 SYRINGE (ML) INJECTION
Status: CANCELLED | OUTPATIENT
Start: 2023-09-29

## 2023-09-29 ENCOUNTER — INFUSION (OUTPATIENT)
Dept: INFUSION THERAPY | Facility: HOSPITAL | Age: 68
End: 2023-09-29
Attending: INTERNAL MEDICINE
Payer: MEDICARE

## 2023-09-29 VITALS
HEIGHT: 67 IN | HEART RATE: 69 BPM | BODY MASS INDEX: 38.33 KG/M2 | RESPIRATION RATE: 14 BRPM | TEMPERATURE: 97 F | WEIGHT: 244.19 LBS | DIASTOLIC BLOOD PRESSURE: 55 MMHG | SYSTOLIC BLOOD PRESSURE: 98 MMHG | OXYGEN SATURATION: 95 %

## 2023-09-29 DIAGNOSIS — C78.6 PERITONEAL CARCINOMATOSIS: ICD-10-CM

## 2023-09-29 DIAGNOSIS — C56.3 OVARIAN CANCER, BILATERAL: Primary | ICD-10-CM

## 2023-09-29 PROCEDURE — 36593 DECLOT VASCULAR DEVICE: CPT

## 2023-09-29 PROCEDURE — 63600175 PHARM REV CODE 636 W HCPCS: Performed by: INTERNAL MEDICINE

## 2023-09-29 PROCEDURE — 96367 TX/PROPH/DG ADDL SEQ IV INF: CPT

## 2023-09-29 PROCEDURE — 96375 TX/PRO/DX INJ NEW DRUG ADDON: CPT

## 2023-09-29 PROCEDURE — 25000003 PHARM REV CODE 250: Performed by: INTERNAL MEDICINE

## 2023-09-29 PROCEDURE — 96415 CHEMO IV INFUSION ADDL HR: CPT

## 2023-09-29 PROCEDURE — 96413 CHEMO IV INFUSION 1 HR: CPT

## 2023-09-29 PROCEDURE — 96417 CHEMO IV INFUS EACH ADDL SEQ: CPT

## 2023-09-29 RX ORDER — PROCHLORPERAZINE EDISYLATE 5 MG/ML
5 INJECTION INTRAMUSCULAR; INTRAVENOUS ONCE AS NEEDED
Status: DISCONTINUED | OUTPATIENT
Start: 2023-09-29 | End: 2023-09-29 | Stop reason: HOSPADM

## 2023-09-29 RX ORDER — HEPARIN 100 UNIT/ML
500 SYRINGE INTRAVENOUS
Status: DISCONTINUED | OUTPATIENT
Start: 2023-09-29 | End: 2023-09-29 | Stop reason: HOSPADM

## 2023-09-29 RX ORDER — DIPHENHYDRAMINE HYDROCHLORIDE 50 MG/ML
50 INJECTION INTRAMUSCULAR; INTRAVENOUS ONCE AS NEEDED
Status: DISCONTINUED | OUTPATIENT
Start: 2023-09-29 | End: 2023-09-29 | Stop reason: HOSPADM

## 2023-09-29 RX ORDER — EPINEPHRINE 0.3 MG/.3ML
0.3 INJECTION SUBCUTANEOUS ONCE AS NEEDED
Status: DISCONTINUED | OUTPATIENT
Start: 2023-09-29 | End: 2023-09-29 | Stop reason: HOSPADM

## 2023-09-29 RX ORDER — FAMOTIDINE 10 MG/ML
20 INJECTION INTRAVENOUS
Status: COMPLETED | OUTPATIENT
Start: 2023-09-29 | End: 2023-09-29

## 2023-09-29 RX ORDER — SODIUM CHLORIDE 0.9 % (FLUSH) 0.9 %
10 SYRINGE (ML) INJECTION
Status: DISCONTINUED | OUTPATIENT
Start: 2023-09-29 | End: 2023-09-29 | Stop reason: HOSPADM

## 2023-09-29 RX ADMIN — DIPHENHYDRAMINE HYDROCHLORIDE 50 MG: 50 INJECTION, SOLUTION INTRAMUSCULAR; INTRAVENOUS at 09:09

## 2023-09-29 RX ADMIN — HEPARIN 500 UNITS: 100 SYRINGE at 03:09

## 2023-09-29 RX ADMIN — DEXAMETHASONE SODIUM PHOSPHATE 0.25 MG: 4 INJECTION, SOLUTION INTRA-ARTICULAR; INTRALESIONAL; INTRAMUSCULAR; INTRAVENOUS; SOFT TISSUE at 08:09

## 2023-09-29 RX ADMIN — APREPITANT 130 MG: 130 INJECTION, EMULSION INTRAVENOUS at 08:09

## 2023-09-29 RX ADMIN — ALTEPLASE 2 MG: 2.2 INJECTION, POWDER, LYOPHILIZED, FOR SOLUTION INTRAVENOUS at 10:09

## 2023-09-29 RX ADMIN — SODIUM CHLORIDE 500 ML: 9 INJECTION, SOLUTION INTRAVENOUS at 02:09

## 2023-09-29 RX ADMIN — FAMOTIDINE 20 MG: 10 INJECTION, SOLUTION INTRAVENOUS at 08:09

## 2023-09-29 RX ADMIN — PACLITAXEL 402 MG: 6 INJECTION, SOLUTION INTRAVENOUS at 11:09

## 2023-09-29 RX ADMIN — ALTEPLASE 2 MG: 2.2 INJECTION, POWDER, LYOPHILIZED, FOR SOLUTION INTRAVENOUS at 09:09

## 2023-09-29 RX ADMIN — CARBOPLATIN 650 MG: 10 INJECTION, SOLUTION INTRAVENOUS at 02:09

## 2023-10-06 ENCOUNTER — LAB VISIT (OUTPATIENT)
Dept: LAB | Facility: HOSPITAL | Age: 68
End: 2023-10-06
Attending: INTERNAL MEDICINE
Payer: MEDICARE

## 2023-10-06 ENCOUNTER — OFFICE VISIT (OUTPATIENT)
Dept: HEMATOLOGY/ONCOLOGY | Facility: CLINIC | Age: 68
End: 2023-10-06
Payer: MEDICARE

## 2023-10-06 VITALS
SYSTOLIC BLOOD PRESSURE: 112 MMHG | WEIGHT: 237.75 LBS | OXYGEN SATURATION: 99 % | TEMPERATURE: 98 F | HEIGHT: 67 IN | DIASTOLIC BLOOD PRESSURE: 72 MMHG | BODY MASS INDEX: 37.31 KG/M2 | HEART RATE: 85 BPM

## 2023-10-06 DIAGNOSIS — R11.0 CHEMOTHERAPY-INDUCED NAUSEA: ICD-10-CM

## 2023-10-06 DIAGNOSIS — C56.3 OVARIAN CANCER, BILATERAL: Primary | ICD-10-CM

## 2023-10-06 DIAGNOSIS — T45.1X5A CHEMOTHERAPY-INDUCED NAUSEA: ICD-10-CM

## 2023-10-06 DIAGNOSIS — C78.6 PERITONEAL CARCINOMATOSIS: ICD-10-CM

## 2023-10-06 LAB
ALBUMIN SERPL BCP-MCNC: 3.9 G/DL (ref 3.5–5.2)
ALP SERPL-CCNC: 109 U/L (ref 55–135)
ALT SERPL W/O P-5'-P-CCNC: 23 U/L (ref 10–44)
ANION GAP SERPL CALC-SCNC: 9 MMOL/L (ref 8–16)
AST SERPL-CCNC: 19 U/L (ref 10–40)
BASOPHILS # BLD AUTO: 0.03 K/UL (ref 0–0.2)
BASOPHILS NFR BLD: 0.5 % (ref 0–1.9)
BILIRUB SERPL-MCNC: 1 MG/DL (ref 0.1–1)
BUN SERPL-MCNC: 17 MG/DL (ref 8–23)
CALCIUM SERPL-MCNC: 9.5 MG/DL (ref 8.7–10.5)
CHLORIDE SERPL-SCNC: 105 MMOL/L (ref 95–110)
CO2 SERPL-SCNC: 30 MMOL/L (ref 23–29)
CREAT SERPL-MCNC: 0.9 MG/DL (ref 0.5–1.4)
DIFFERENTIAL METHOD: ABNORMAL
EOSINOPHIL # BLD AUTO: 0.1 K/UL (ref 0–0.5)
EOSINOPHIL NFR BLD: 2.4 % (ref 0–8)
ERYTHROCYTE [DISTWIDTH] IN BLOOD BY AUTOMATED COUNT: 14.5 % (ref 11.5–14.5)
EST. GFR  (NO RACE VARIABLE): >60 ML/MIN/1.73 M^2
GLUCOSE SERPL-MCNC: 117 MG/DL (ref 70–110)
HCT VFR BLD AUTO: 39.5 % (ref 37–48.5)
HGB BLD-MCNC: 12.6 G/DL (ref 12–16)
IMM GRANULOCYTES # BLD AUTO: 0.03 K/UL (ref 0–0.04)
IMM GRANULOCYTES NFR BLD AUTO: 0.5 % (ref 0–0.5)
LYMPHOCYTES # BLD AUTO: 1.8 K/UL (ref 1–4.8)
LYMPHOCYTES NFR BLD: 32.7 % (ref 18–48)
MAGNESIUM SERPL-MCNC: 1.6 MG/DL (ref 1.6–2.6)
MCH RBC QN AUTO: 28.3 PG (ref 27–31)
MCHC RBC AUTO-ENTMCNC: 31.9 G/DL (ref 32–36)
MCV RBC AUTO: 89 FL (ref 82–98)
MONOCYTES # BLD AUTO: 0.1 K/UL (ref 0.3–1)
MONOCYTES NFR BLD: 0.9 % (ref 4–15)
NEUTROPHILS # BLD AUTO: 3.5 K/UL (ref 1.8–7.7)
NEUTROPHILS NFR BLD: 63 % (ref 38–73)
NRBC BLD-RTO: 0 /100 WBC
PLATELET # BLD AUTO: 193 K/UL (ref 150–450)
PMV BLD AUTO: 10 FL (ref 9.2–12.9)
POTASSIUM SERPL-SCNC: 4 MMOL/L (ref 3.5–5.1)
PROT SERPL-MCNC: 6.8 G/DL (ref 6–8.4)
RBC # BLD AUTO: 4.46 M/UL (ref 4–5.4)
SODIUM SERPL-SCNC: 144 MMOL/L (ref 136–145)
WBC # BLD AUTO: 5.48 K/UL (ref 3.9–12.7)

## 2023-10-06 PROCEDURE — 99999 PR PBB SHADOW E&M-EST. PATIENT-LVL IV: ICD-10-PCS | Mod: PBBFAC,,, | Performed by: NURSE PRACTITIONER

## 2023-10-06 PROCEDURE — 36415 COLL VENOUS BLD VENIPUNCTURE: CPT | Performed by: INTERNAL MEDICINE

## 2023-10-06 PROCEDURE — 3044F HG A1C LEVEL LT 7.0%: CPT | Mod: CPTII,S$GLB,, | Performed by: NURSE PRACTITIONER

## 2023-10-06 PROCEDURE — 1159F MED LIST DOCD IN RCRD: CPT | Mod: CPTII,S$GLB,, | Performed by: NURSE PRACTITIONER

## 2023-10-06 PROCEDURE — 1101F PR PT FALLS ASSESS DOC 0-1 FALLS W/OUT INJ PAST YR: ICD-10-PCS | Mod: CPTII,S$GLB,, | Performed by: NURSE PRACTITIONER

## 2023-10-06 PROCEDURE — 3008F PR BODY MASS INDEX (BMI) DOCUMENTED: ICD-10-PCS | Mod: CPTII,S$GLB,, | Performed by: NURSE PRACTITIONER

## 2023-10-06 PROCEDURE — 3288F PR FALLS RISK ASSESSMENT DOCUMENTED: ICD-10-PCS | Mod: CPTII,S$GLB,, | Performed by: NURSE PRACTITIONER

## 2023-10-06 PROCEDURE — 1101F PT FALLS ASSESS-DOCD LE1/YR: CPT | Mod: CPTII,S$GLB,, | Performed by: NURSE PRACTITIONER

## 2023-10-06 PROCEDURE — 3078F PR MOST RECENT DIASTOLIC BLOOD PRESSURE < 80 MM HG: ICD-10-PCS | Mod: CPTII,S$GLB,, | Performed by: NURSE PRACTITIONER

## 2023-10-06 PROCEDURE — 85025 COMPLETE CBC W/AUTO DIFF WBC: CPT | Performed by: INTERNAL MEDICINE

## 2023-10-06 PROCEDURE — 1126F AMNT PAIN NOTED NONE PRSNT: CPT | Mod: CPTII,S$GLB,, | Performed by: NURSE PRACTITIONER

## 2023-10-06 PROCEDURE — 99215 OFFICE O/P EST HI 40 MIN: CPT | Mod: S$GLB,,, | Performed by: NURSE PRACTITIONER

## 2023-10-06 PROCEDURE — 3008F BODY MASS INDEX DOCD: CPT | Mod: CPTII,S$GLB,, | Performed by: NURSE PRACTITIONER

## 2023-10-06 PROCEDURE — 3288F FALL RISK ASSESSMENT DOCD: CPT | Mod: CPTII,S$GLB,, | Performed by: NURSE PRACTITIONER

## 2023-10-06 PROCEDURE — 3044F PR MOST RECENT HEMOGLOBIN A1C LEVEL <7.0%: ICD-10-PCS | Mod: CPTII,S$GLB,, | Performed by: NURSE PRACTITIONER

## 2023-10-06 PROCEDURE — 1159F PR MEDICATION LIST DOCUMENTED IN MEDICAL RECORD: ICD-10-PCS | Mod: CPTII,S$GLB,, | Performed by: NURSE PRACTITIONER

## 2023-10-06 PROCEDURE — 1126F PR PAIN SEVERITY QUANTIFIED, NO PAIN PRESENT: ICD-10-PCS | Mod: CPTII,S$GLB,, | Performed by: NURSE PRACTITIONER

## 2023-10-06 PROCEDURE — 80053 COMPREHEN METABOLIC PANEL: CPT | Performed by: INTERNAL MEDICINE

## 2023-10-06 PROCEDURE — 83735 ASSAY OF MAGNESIUM: CPT | Performed by: INTERNAL MEDICINE

## 2023-10-06 PROCEDURE — 99215 PR OFFICE/OUTPT VISIT, EST, LEVL V, 40-54 MIN: ICD-10-PCS | Mod: S$GLB,,, | Performed by: NURSE PRACTITIONER

## 2023-10-06 PROCEDURE — 1160F RVW MEDS BY RX/DR IN RCRD: CPT | Mod: CPTII,S$GLB,, | Performed by: NURSE PRACTITIONER

## 2023-10-06 PROCEDURE — 99999 PR PBB SHADOW E&M-EST. PATIENT-LVL IV: CPT | Mod: PBBFAC,,, | Performed by: NURSE PRACTITIONER

## 2023-10-06 PROCEDURE — 3078F DIAST BP <80 MM HG: CPT | Mod: CPTII,S$GLB,, | Performed by: NURSE PRACTITIONER

## 2023-10-06 PROCEDURE — 1160F PR REVIEW ALL MEDS BY PRESCRIBER/CLIN PHARMACIST DOCUMENTED: ICD-10-PCS | Mod: CPTII,S$GLB,, | Performed by: NURSE PRACTITIONER

## 2023-10-06 PROCEDURE — 3074F PR MOST RECENT SYSTOLIC BLOOD PRESSURE < 130 MM HG: ICD-10-PCS | Mod: CPTII,S$GLB,, | Performed by: NURSE PRACTITIONER

## 2023-10-06 PROCEDURE — 3074F SYST BP LT 130 MM HG: CPT | Mod: CPTII,S$GLB,, | Performed by: NURSE PRACTITIONER

## 2023-10-06 RX ORDER — ONDANSETRON 8 MG/1
8 TABLET, ORALLY DISINTEGRATING ORAL EVERY 6 HOURS PRN
Qty: 30 TABLET | Refills: 11 | Status: SHIPPED | OUTPATIENT
Start: 2023-10-06

## 2023-10-06 RX ORDER — PROCHLORPERAZINE MALEATE 5 MG
10 TABLET ORAL EVERY 6 HOURS PRN
Qty: 30 TABLET | Refills: 11 | Status: SHIPPED | OUTPATIENT
Start: 2023-10-06

## 2023-10-06 NOTE — ASSESSMENT & PLAN NOTE
Treated with neoadjuvant chemotherapy with carboplatin paclitaxel and Avastin (02/2019 - 07/2019), followed by surgical resection in August 2019.  Patient was then treated with Avastin maintenance which was completed in 9/2020. On 8/21/2023, surveillance CT CAP demonstrated marked interval disease progression with severe peritoneal carcinomatosis. : 7 >> 21 >> 32 >> 64 (9/2023)

## 2023-10-06 NOTE — PROGRESS NOTES
Subjective:       Patient ID: Casie Gaines is a 67 y.o. female.    Chief Complaint:   1. Ovarian cancer, bilateral  Stage IV       2. Peritoneal carcinomatosis          Current Treatment:  OP GYN PACLITAXEL CARBOPLATIN (AUC 6) Q3W    Treatment History:   Carboplatin, paclitaxel and Avastin x 6 cycles 2/2019-7/2019   S/p salpingo-oophorectomy, ureterolysis/Omentectomy with complete pathologic response on 8/15/2019    Avastin maintenance from 10/2019 - 9/2020    HPI: This is a 67 year old  woman with arthritis, neck pain, HTN, anxiety, hyperlipidemia, and CHF who is seen in Hem/Onc for ovarian cancer. She was diagnosed with peritoneal carcinomatosis and malignant right pleural effusion consistent with ovarian primary with CA-125 of 2740. PET scan demonstrated peritoneal carcinomatosis & extensive lymphadenopathy.  She was treated with neoadjuvant chemotherapy with Carboplatin/Taxol/Avastin and had a right ureter stent placed due to postrenal obstruction from tumor. On 8/15/2019, she underwent salpingoophorectomy. Pathology showed CR.  Patient was then treated with maintenance Avastin and later on discontinued in September of 2020.    She was noted on surveillance CT scan to have disease recurrence on 8/18/2023 and started on carboplatin and paclitaxel as platinum sensitive disease by GYN/Onc.     Her primary Hematologist/Oncologist is  Dr. Rangel .    Interval History: Patient presents for follow up on Carbo/Taxol; She is scheduled to receive C2D1 in 2 weeks. She is seen today for toxicity check. She presents alone and reports significant nausea throughout the day unrelieved by compazine 5mg. She also reports ageusia; due to ageusia and nausea, she has not been eating much and is weak.   Will increase compazine to 10mg and add zofran ODT; instructed her to alternate. Also advised her that for the first week after chemotherapy, she can alternate zofran and compazine around the clock to prevent  nausea. Discussed lemonheads for ageusia; informed her that appetite stimulants can cause adverse side effects for cancer patients such as increased risk for blood clots. CBC, CMP stable; no anemia.     Reviewed labs with patient:   CBC:   Recent Labs   Lab 10/06/23  1032   WBC 5.48   RBC 4.46   Hemoglobin 12.6   Hematocrit 39.5   Platelets 193   MCV 89   MCH 28.3   MCHC 31.9 L     CMP:  Recent Labs   Lab 10/06/23  1032   Glucose 117 H   Calcium 9.5   Albumin 3.9   Total Protein 6.8   Sodium 144   Potassium 4.0   CO2 30 H   Chloride 105   BUN 17   Creatinine 0.9   Alkaline Phosphatase 109   ALT 23   AST 19   Total Bilirubin 1.0       Social History     Socioeconomic History    Marital status:    Tobacco Use    Smoking status: Former     Current packs/day: 0.00     Average packs/day: 1 pack/day for 25.0 years (25.0 ttl pk-yrs)     Types: Cigarettes     Start date: 10/1/1993     Quit date: 10/1/2018     Years since quittin.0    Smokeless tobacco: Never    Tobacco comments:     States started quit 2 months ago after 30 years   Substance and Sexual Activity    Alcohol use: Yes     Comment: occasionally  No alcohol 72h prior to sx    Drug use: No    Sexual activity: Not Currently     Partners: Male     Comment: hyst; mut monog   Social History Narrative    Live w/ spouse and 2 dogs      Social Determinants of Health     Stress: No Stress Concern Present (2019)    [employer] - Occupational Stress Questionnaire     Feeling of Stress : Only a little     Past Medical History:   Diagnosis Date    Anemia     Anxiety     Arthritis     knees    Cancer     ovarian    CHF (congestive heart failure)     Hx antineoplastic chemotherapy     last 2019    Hyperlipemia     Hypertension     Neck pain     Ovarian cancer 2019    CHEMO    Peritoneal carcinomatosis 2019     Family History   Problem Relation Age of Onset    Glaucoma Mother     Colon cancer Brother     Breast cancer Maternal  Aunt     Breast cancer Paternal Aunt     Ovarian cancer Paternal Aunt     Anesthesia problems Other     Thrombophilia Neg Hx      Past Surgical History:   Procedure Laterality Date    breast reduction  10/02/2018    CATARACT EXTRACTION Bilateral     2023     SECTION      X 1    COLONOSCOPY N/A 2021    Procedure: COLONOSCOPY;  Surgeon: Paulette Rojas MD;  Location: [facility];  Service: Gastroenterology;  Laterality: N/A;    CYSTOSCOPY W/ URETERAL STENT PLACEMENT Right 2019    Procedure: CYSTOSCOPY, WITH URETERAL STENT INSERTION;  Surgeon: Timmy Santiago IV, MD;  Location: [facility];  Service: Urology;  Laterality: Right;    CYSTOSCOPY W/ URETERAL STENT REMOVAL  10/04/2019    DILATION AND CURETTAGE OF UTERUS      HYSTERECTOMY      RALH for fibroids (still has ovaries)    INSERTION OF VENOUS ACCESS PORT Left 2019    Procedure: INSERTION, VENOUS ACCESS PORT;  Surgeon: Ulisses Monzon MD;  Location: [facility];  Service: General;  Laterality: Left;  Left internal jugular     LYSIS OF ADHESIONS OF URETER N/A 08/15/2019    Procedure: URETEROLYSIS;  Surgeon: Ismael Juarez MD;  Location: [facility];  Service: OB/GYN;  Laterality: N/A;    OMENTECTOMY N/A 08/15/2019    Procedure: OMENTECTOMY;  Surgeon: Ismael Juarez MD;  Location: [facility];  Service: OB/GYN;  Laterality: N/A;    RETROGRADE PYELOGRAPHY Right 2019    Procedure: PYELOGRAM, RETROGRADE;  Surgeon: Timmy Santiago IV, MD;  Location: [facility] OR;  Service: Urology;  Laterality: Right;    ROBOT-ASSISTED LAPAROSCOPIC SALPINGO-OOPHORECTOMY USING DA TIA XI Bilateral 08/15/2019    Procedure: XI ROBOTIC SALPINGO-OOPHORECTOMY;  Surgeon: Ismael Juarez MD;  Location: [facility];  Service: OB/GYN;  Laterality: Bilateral;    TOTAL REDUCTION MAMMOPLASTY  2018    TUBAL LIGATION       Review of Systems   Constitutional:  Positive for appetite change (ageusia), fatigue and unexpected weight change (due to lack of eating).   HENT:  Negative for  mouth sores, rhinorrhea and sore throat.    Eyes: Negative.    Respiratory: Negative.     Cardiovascular: Negative.    Gastrointestinal:  Positive for nausea. Negative for constipation, diarrhea and vomiting.   Endocrine: Negative.    Genitourinary: Negative.    Musculoskeletal: Negative.    Integumentary:  Negative.   Allergic/Immunologic: Negative.    Neurological:  Positive for dizziness, weakness and numbness (bilateral feet intermittently). Negative for light-headedness and headaches.   Hematological: Negative.    Psychiatric/Behavioral: Negative.         Medication List with Changes/Refills   Current Medications    ALBUTEROL 90 MCG/ACTUATION INHALER    Inhale 2 puffs into the lungs every 4 (four) hours as needed for Wheezing. Rescue    ALPRAZOLAM (XANAX) 0.25 MG TABLET    Take 1 tablet (0.25 mg total) by mouth 3 (three) times daily as needed for Anxiety.    AMLODIPINE (NORVASC) 5 MG TABLET    Take 2 tablets (10 mg total) by mouth once daily.    ATENOLOL (TENORMIN) 25 MG TABLET    Take 1 tablet (25 mg total) by mouth once daily.    AZELASTINE (ASTELIN) 137 MCG (0.1 %) NASAL SPRAY    1 spray (137 mcg total) by Nasal route 2 (two) times daily.    BIOTIN 1 MG TABLET    Take 1,000 mcg by mouth once daily.     CETIRIZINE (ZYRTEC) 10 MG TABLET    Take 1 tablet (10 mg total) by mouth once daily.    DEXAMETHASONE (DECADRON) 4 MG TAB    Take 2 tablets (8 mg total) by mouth once daily. Take as directed on days 2, 3 and 4 of your chemotherapy cycle.    DICLOFENAC (VOLTAREN) 75 MG EC TABLET    Take 1 tablet (75 mg total) by mouth 2 (two) times daily.    DICLOFENAC SODIUM (VOLTAREN) 1 % GEL    Apply once a day to back as needed    EPINEPHRINE (EPIPEN) 0.3 MG/0.3 ML ATIN    Inject 0.3 mLs (0.3 mg total) into the muscle once. for 1 dose    FLUTICASONE PROPIONATE (FLONASE) 50 MCG/ACTUATION NASAL SPRAY    1 spray (50 mcg total) by Each Nostril route every 12 (twelve) hours as needed for Rhinitis.    GABAPENTIN (NEURONTIN) 100  MG CAPSULE    Take 1 capsule (100 mg total) by mouth 3 (three) times daily.    GINKGO BILOBA 40 MG TAB    Take 40 mg by mouth.    HYDROXYZINE HCL (ATARAX) 25 MG TABLET    Take 1 tablet (25 mg total) by mouth 3 (three) times daily as needed for Itching.    MULTIVITAMIN WITH MINERALS TABLET    Take 1 tablet by mouth once daily.     OLANZAPINE (ZYPREXA) 5 MG TABLET    Take 1 tablet (5 mg total) by mouth every evening on days 1 through 4 of your chemotherapy regimen.    OLMESARTAN-HYDROCHLOROTHIAZIDE (BENICAR HCT) 40-25 MG PER TABLET    Take 1 tablet by mouth once daily.    PROCHLORPERAZINE (COMPAZINE) 5 MG TABLET    Take 1 tablet (5 mg total) by mouth every 6 (six) hours as needed (nausea or vomiting).    ROSUVASTATIN (CRESTOR) 10 MG TABLET    Take 1 tablet (10 mg total) by mouth once daily.    SERTRALINE (ZOLOFT) 25 MG TABLET    Take 1 tablet (25 mg total) by mouth once daily.    SUMATRIPTAN (IMITREX) 50 MG TABLET    Take 1 tablet (50 mg total) by mouth every 4 to 6 hours as needed for Migraine.    ZINC GLUCONATE 50 MG TABLET    Take 50 mg by mouth once daily.     Objective:     Vitals:    10/06/23 1112   BP: 112/72   Pulse: 85   Temp: 97.6 °F (36.4 °C)     Physical Exam  Vitals reviewed.   Constitutional:       Appearance: Normal appearance.   HENT:      Head: Normocephalic.      Comments: alopecia     Mouth/Throat:      Comments: Wearing mask      Eyes:      Extraocular Movements: Extraocular movements intact.      Pupils: Pupils are equal, round, and reactive to light.   Cardiovascular:      Rate and Rhythm: Normal rate and regular rhythm.      Heart sounds: Normal heart sounds.   Pulmonary:      Effort: Pulmonary effort is normal.      Breath sounds: Normal breath sounds.   Abdominal:      General: Bowel sounds are normal.      Palpations: Abdomen is soft.      Comments: rounded     Genitourinary:     Comments: deferred    Musculoskeletal:         General: Normal range of motion.      Cervical back: Normal range  of motion and neck supple.   Skin:     General: Skin is warm and dry.   Neurological:      Mental Status: She is alert and oriented to person, place, and time.   Psychiatric:         Behavior: Behavior normal.         Thought Content: Thought content normal.          (1) Restricted in physically strenuous activity, ambulatory and able to do work of light nature  Assessment:     Problem List Items Addressed This Visit          Oncology    Peritoneal carcinomatosis    Ovarian cancer, bilateral - Primary     Treated with neoadjuvant chemotherapy with carboplatin paclitaxel and Avastin (02/2019 - 07/2019), followed by surgical resection in August 2019.  Patient was then treated with Avastin maintenance which was completed in 9/2020. On 8/21/2023, surveillance CT CAP demonstrated marked interval disease progression with severe peritoneal carcinomatosis. : 7 >> 21 >> 32 >> 64 (9/2023)          Other Visit Diagnoses       Chemotherapy-induced nausea              Plan:     Ovarian cancer, bilateral    Peritoneal carcinomatosis    Chemotherapy-induced nausea    Labs reviewed.   Patient tolerating chemotherapy with significant nausea and lack of appetite.   Increased compazine to 10mg; added zofran ODT 8mg every 6hrs prn nausea.   Follow up in 2 weeks with Dr. Rangel with  Mg, , CBC, and Comprehensive Metabolic Panel prior to C2D1.    Route Chart for Scheduling    Med Onc Chart Routing      Follow up with physician 2 weeks. Dr. Rangel   Follow up with [facility]    Infusion scheduling note    Injection scheduling note in 2 weeks for C2D1   Labs CBC, CMP, magnesium and other   Scheduling:  Preferred lab:  Lab interval:  and Ca-125 in 2 weeks   Imaging None      Pharmacy appointment No pharmacy appointment needed      Other referrals       No additional referrals needed             I will review assessment/plan with collaborating physician.      TRUPTI Nascimento

## 2023-10-07 DIAGNOSIS — I10 HYPERTENSION, UNSPECIFIED TYPE: ICD-10-CM

## 2023-10-07 RX ORDER — ATENOLOL 25 MG/1
25 TABLET ORAL DAILY
Qty: 90 TABLET | Refills: 2 | Status: SHIPPED | OUTPATIENT
Start: 2023-10-07 | End: 2024-07-03

## 2023-10-07 NOTE — TELEPHONE ENCOUNTER
Refill Decision Note   Casie Eder  is requesting a refill authorization.  Brief Assessment and Rationale for Refill:  Approve     Medication Therapy Plan:         Comments:     Note composed:10:19 AM 10/07/2023

## 2023-10-07 NOTE — TELEPHONE ENCOUNTER
No care due was identified.  [facility] Embedded Care Due Messages. Reference number: [id number].   10/07/2023 5:08:58 AM CDT

## 2023-10-12 DIAGNOSIS — C56.9 MALIGNANT NEOPLASM OF OVARY, UNSPECIFIED LATERALITY: ICD-10-CM

## 2023-10-12 RX ORDER — ALPRAZOLAM 0.25 MG/1
0.25 TABLET ORAL 3 TIMES DAILY PRN
Qty: 60 TABLET | Refills: 2 | Status: CANCELLED | OUTPATIENT
Start: 2023-10-12 | End: 2024-10-11

## 2023-10-12 NOTE — TELEPHONE ENCOUNTER
Care Due:                  Date            Visit Type   Department     Provider  --------------------------------------------------------------------------------                                EP -                              PRIMARY      [facility] INTERNAL  Last Visit: 06-      CARE ([facility])   MARK Ang                              EP -                              PRIMARY      [facility] INTERNAL  Next Visit: 12-      CARE ([facility])   [facility]       Rossana Ang                                                            Last  Test          Frequency    Reason                     Performed    Due Date  --------------------------------------------------------------------------------    Lipid Panel.  12 months..  rosuvastatin.............  01- 01-    Health [facility] Embedded Care Due Messages. Reference number: [id number].   10/12/2023 11:06:37 AM CDT

## 2023-10-12 NOTE — TELEPHONE ENCOUNTER
Refill Routing Note     Refill Routing Note   Medication(s) are not appropriate for processing by [facility] for the following reason(s):      Medication outside of protocol    [facility] action(s):  Route Care Due:  Labs due            Appointments  past 12m or future 3m with PCP    Date Provider   Last Visit   6/12/2023 Rossana Ang MD   Next Visit   12/13/2023 Rossana Ang MD   ED visits in past 90 days: 0        Note composed:1:19 PM 10/12/2023

## 2023-10-17 ENCOUNTER — TELEPHONE (OUTPATIENT)
Dept: HEMATOLOGY/ONCOLOGY | Facility: CLINIC | Age: 68
End: 2023-10-17
Payer: MEDICARE

## 2023-10-17 DIAGNOSIS — C56.9 MALIGNANT NEOPLASM OF OVARY, UNSPECIFIED LATERALITY: ICD-10-CM

## 2023-10-17 NOTE — TELEPHONE ENCOUNTER
----- Message from Arnel Portillo sent at 10/17/2023  3:35 PM CDT -----  Contact: Patient  Casie Frias Eder would like a call back at [phone], in regards to her appt on 10/19/23. Pt states she is not able to do a virtual visit. She would like to get it switched to in office.

## 2023-10-17 NOTE — TELEPHONE ENCOUNTER
Refill Routing Note   Medication(s) are not appropriate for processing by [facility] for the following reason(s):      Medication outside of protocol    [facility] action(s):  Route Care Due:  None identified              Appointments  past 12m or future 3m with PCP    Date Provider   Last Visit   6/12/2023 Rossana Ang MD   Next Visit   12/13/2023 Rossana Ang MD   ED visits in past 90 days: 0        Note composed:4:48 PM 10/17/2023

## 2023-10-17 NOTE — TELEPHONE ENCOUNTER
Spoke with Mrs Gaines explained that clinic staff at the  would assist with logging on to vv. Verbalized understanding advised she will keep scheduled appt

## 2023-10-17 NOTE — TELEPHONE ENCOUNTER
No care due was identified.  [facility] Embedded Care Due Messages. Reference number: [id number].   10/17/2023 3:33:36 PM CDT

## 2023-10-18 RX ORDER — ALPRAZOLAM 0.25 MG/1
0.25 TABLET ORAL 3 TIMES DAILY PRN
Qty: 60 TABLET | Refills: 2 | Status: SHIPPED | OUTPATIENT
Start: 2023-10-18 | End: 2024-10-17

## 2023-10-18 NOTE — PROGRESS NOTES
HEMATOLOGY / ONCOLOGY   CLINIC NOTE       Established Patient - Telehealth Visit     The patient location is: clinic  The chief complaint leading to consultation is: follow up / chemo  Visit type: Virtual visit with synchronous audio and video    Face to Face time with patient: 35 minutes of total time spent on the encounter, which includes face to face time and non-face to face time preparing to see the patient (eg, review of tests), Obtaining and/or reviewing separately obtained history, Documenting clinical information in the electronic or other health record, Independently interpreting results (not separately reported) and communicating results to the patient/family/caregiver, or Care coordination (not separately reported).        The reason for the audio only service rather than synchronous audio and video virtual visit was related to technical difficulties or patient preference/necessity.     Each patient to whom I provide medical services by telemedicine is:  (1) informed of the relationship between the physician and patient and the respective role of any other health care provider with respect to management of the patient; and (2) notified that they may decline to receive medical services by telemedicine and may withdraw from such care at any time. Patient verbally consented to receive this service via voice-only telephone call.             This service was not originating from a related E/M service provided within the previous 7 days nor will  to an E/M service or procedure within the next 24 hours or my soonest available appointment.  Prevailing standard of care was able to be met in this audio-only visit.    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  female with past medical history significant for hypertension, CHF here for follow up of ovarian cancer    Pt was diagnosed with peritoneal carcinomatosis and malignant right pleural effusion consistent with ovarian primary with CA-125 of 2740. PET scan demonstrated peritoneal carcinomatosis & extensive lymphadenopathy.  She was tried treated with neoadjuvant chemotherapy with carboplatin/Taxol/Avastin and had a right ureter stent placed due to postrenal obstruction from tumor. On 8/15/2019 underwent salpingoophorectomy. Pathology showed CR.  Patient was then treated with maintenance Avastin and later on discontinued and September of 2020.    She was noted on surveillance CT scan to have disease recurrence on 08/18/2023 and started on carboplatin and paclitaxel as platinum sensitive disease by gyn Oncology.     Interval History:     Patient here for 2nd cycle carboplatin and paclitaxel.  Denies any issues today but earlier she had some nausea with the chemotherapy and relieved with the medications.    Oncology History   Peritoneal carcinomatosis   1/26/2019 Initial Diagnosis    Peritoneal carcinomatosis     2/15/2019 - 7/8/2019 Chemotherapy    Treatment Summary   Plan Name: OP GYN PACLITAXEL CARBOPLATIN (AUC 6) Q3W  Treatment Goal: Palliative  Status: Inactive  Start Date: 2/28/2019  End Date: 6/18/2019  Provider: Will Trevizo Jr., MD  Chemotherapy: bevacizumab (AVASTIN) 15 mg/kg = 1,600 mg in sodium chloride 0.9% 100 mL chemo infusion, 15 mg/kg = 1,600 mg (100 % of original dose 15 mg/kg), Intravenous, Clinic/[facility] 1 time, 6 of 6 cycles  Dose modification: 15 mg/kg (original dose 15 mg/kg, Cycle 1)  Administration: 1,600 mg  (2/28/2019), 1,600 mg (3/21/2019), 1,600 mg (4/11/2019), 1,600 mg (5/7/2019), 1,600 mg (5/28/2019), 1,510 mg (6/18/2019)  CARBOplatin (PARAPLATIN) 870 mg in sodium chloride 0.9% 337 mL chemo infusion, 870 mg (100 % of original dose 868.8 mg), Intravenous, Clinic/H[facility] 1 time, 6 of 6 cycles  Dose modification:   (original dose 868.8 mg, Cycle 1)  Administration: 870 mg (2/28/2019), 870 mg (3/21/2019), 900 mg (4/11/2019), 870 mg (5/7/2019), 660 mg (5/28/2019), 690 mg (6/18/2019)  PACLitaxel (TAXOL) 175 mg/m2 = 396 mg in sodium chloride 0.9% 566 mL chemo infusion, 175 mg/m2 = 396 mg, Intravenous, Clinic/[facility] 1 time, 6 of 6 cycles  Dose modification: 140 mg/m2 (80 % of original dose 175 mg/m2, Cycle 5)  Administration: 396 mg (2/28/2019), 396 mg (3/21/2019), 396 mg (4/11/2019), 396 mg (5/7/2019), 318 mg (5/28/2019), 306 mg (6/18/2019)     10/9/2019 - 9/24/2020 Chemotherapy    Treatment Summary   Plan Name: OP BEVACIZUMAB Q3W   Treatment Goal: Maintenance  Status: Inactive  Start Date: 10/9/2019  End Date: 9/24/2020  Provider: Oscar Mckeon MD  Chemotherapy: dexAMETHasone tablet 8 mg, 8 mg (100 % of original dose 8 mg), Oral, Clinic/[facility] 1 time, 2 of 8 cycles  Dose modification: 8 mg (original dose 8 mg, Cycle 8), 8 mg (original dose 8 mg, Cycle 12)  Administration: 8 mg (7/22/2020), 8 mg (8/13/2020)  bevacizumab (AVASTIN) 15 mg/kg = 1,615 mg in sodium chloride 0.9% 100 mL chemo infusion, 15 mg/kg = 1,615 mg, Intravenous, Clinic/[facility] 1 time, 11 of 17 cycles  Administration: 1,615 mg (10/9/2019), 1,615 mg (10/29/2019), 1,615 mg (12/10/2019), 1,615 mg (1/21/2020), 1,600 mg (4/2/2020), 1,615 mg (4/23/2020), 1,600 mg (7/1/2020), 1,600 mg (7/22/2020), 1,600 mg (8/13/2020), 1,795 mg (9/3/2020), 1,795 mg (9/24/2020)     9/29/2023 -  Chemotherapy    Treatment Summary   Plan Name: OP GYN PACLITAXEL CARBOPLATIN (AUC 6) Q3W  Treatment Goal: Control  Status: Active  Start Date: 9/29/2023  End Date: 8/30/2024 (Planned)  Provider: Ismael BENITEZ  MD Ann  Chemotherapy: CARBOplatin (PARAPLATIN) 650 mg in sodium chloride 0.9% 335 mL chemo infusion, 650 mg (100 % of original dose 648 mg), Intravenous, Clinic/[facility] 1 time, 1 of 17 cycles  Dose modification:   (original dose 648 mg, Cycle 1),   (Cycle 2)  Administration: 650 mg (9/29/2023)  PACLitaxeL (TAXOL) 175 mg/m2 = 402 mg in sodium chloride 0.9% 500 mL chemo infusion, 175 mg/m2 = 402 mg, Intravenous, Clinic/[facility] 1 time, 1 of 17 cycles  Administration: 402 mg (9/29/2023)     Malignant neoplasm of right ovary (Resolved)   2/15/2019 Initial Diagnosis    Malignant neoplasm of right ovary     2/15/2019 - 7/8/2019 Chemotherapy    Treatment Summary   Plan Name: OP GYN PACLITAXEL CARBOPLATIN (AUC 6) Q3W  Treatment Goal: Palliative  Status: Inactive  Start Date: 2/28/2019  End Date: 6/18/2019  Provider: Will Trevizo Jr., MD  Chemotherapy: bevacizumab (AVASTIN) 15 mg/kg = 1,600 mg in sodium chloride 0.9% 100 mL chemo infusion, 15 mg/kg = 1,600 mg (100 % of original dose 15 mg/kg), Intravenous, Clinic/[facility] 1 time, 6 of 6 cycles  Dose modification: 15 mg/kg (original dose 15 mg/kg, Cycle 1)  Administration: 1,600 mg (2/28/2019), 1,600 mg (3/21/2019), 1,600 mg (4/11/2019), 1,600 mg (5/7/2019), 1,600 mg (5/28/2019), 1,510 mg (6/18/2019)  CARBOplatin (PARAPLATIN) 870 mg in sodium chloride 0.9% 337 mL chemo infusion, 870 mg (100 % of original dose 868.8 mg), Intravenous, Clinic/HOD 1 time, 6 of 6 cycles  Dose modification:   (original dose 868.8 mg, Cycle 1)  Administration: 870 mg (2/28/2019), 870 mg (3/21/2019), 900 mg (4/11/2019), 870 mg (5/7/2019), 660 mg (5/28/2019), 690 mg (6/18/2019)  PACLitaxel (TAXOL) 175 mg/m2 = 396 mg in sodium chloride 0.9% 566 mL chemo infusion, 175 mg/m2 = 396 mg, Intravenous, Clinic/[facility] 1 time, 6 of 6 cycles  Dose modification: 140 mg/m2 (80 % of original dose 175 mg/m2, Cycle 5)  Administration: 396 mg (2/28/2019), 396 mg (3/21/2019), 396 mg (4/11/2019), 396 mg (5/7/2019), 318 mg  (5/28/2019), 306 mg (6/18/2019)     10/9/2019 - 9/24/2020 Chemotherapy    Treatment Summary   Plan Name: OP BEVACIZUMAB Q3W   Treatment Goal: Maintenance  Status: Inactive  Start Date: 10/9/2019  End Date: 9/24/2020  Provider: Oscar Mckeon MD  Chemotherapy: dexAMETHasone tablet 8 mg, 8 mg (100 % of original dose 8 mg), Oral, Clinic/HOD 1 time, 2 of 8 cycles  Dose modification: 8 mg (original dose 8 mg, Cycle 8), 8 mg (original dose 8 mg, Cycle 12)  Administration: 8 mg (7/22/2020), 8 mg (8/13/2020)  bevacizumab (AVASTIN) 15 mg/kg = 1,615 mg in sodium chloride 0.9% 100 mL chemo infusion, 15 mg/kg = 1,615 mg, Intravenous, Clinic/HOD 1 time, 11 of 17 cycles  Administration: 1,615 mg (10/9/2019), 1,615 mg (10/29/2019), 1,615 mg (12/10/2019), 1,615 mg (1/21/2020), 1,600 mg (4/2/2020), 1,615 mg (4/23/2020), 1,600 mg (7/1/2020), 1,600 mg (7/22/2020), 1,600 mg (8/13/2020), 1,795 mg (9/3/2020), 1,795 mg (9/24/2020)     Malignant neoplasm of both ovaries (Resolved)   2/18/2019 Initial Diagnosis    Ovarian cancer     10/9/2019 - 9/24/2020 Chemotherapy    Treatment Summary   Plan Name: OP BEVACIZUMAB Q3W   Treatment Goal: Maintenance  Status: Inactive  Start Date: 10/9/2019  End Date: 9/24/2020  Provider: sOcar Mckeon MD  Chemotherapy: dexAMETHasone tablet 8 mg, 8 mg (100 % of original dose 8 mg), Oral, Clinic/HOD 1 time, 2 of 8 cycles  Dose modification: 8 mg (original dose 8 mg, Cycle 8), 8 mg (original dose 8 mg, Cycle 12)  Administration: 8 mg (7/22/2020), 8 mg (8/13/2020)  bevacizumab (AVASTIN) 15 mg/kg = 1,615 mg in sodium chloride 0.9% 100 mL chemo infusion, 15 mg/kg = 1,615 mg, Intravenous, [facility] 1 time, 11 of 17 cycles  Administration: 1,615 mg (10/9/2019), 1,615 mg (10/29/2019), 1,615 mg (12/10/2019), 1,615 mg (1/21/2020), 1,600 mg (4/2/2020), 1,615 mg (4/23/2020), 1,600 mg (7/1/2020), 1,600 mg (7/22/2020), 1,600 mg (8/13/2020), 1,795 mg (9/3/2020), 1,795 mg (9/24/2020)     Ovarian cancer, bilateral   8/15/2019  Initial Diagnosis    Ovarian cancer, bilateral     10/9/2019 - 9/24/2020 Chemotherapy    Treatment Summary   Plan Name: OP BEVACIZUMAB Q3W   Treatment Goal: Maintenance  Status: Inactive  Start Date: 10/9/2019  End Date: 9/24/2020  Provider: Oscar Mckeon MD  Chemotherapy: dexAMETHasone tablet 8 mg, 8 mg (100 % of original dose 8 mg), Oral, Clinic/HOD 1 time, 2 of 8 cycles  Dose modification: 8 mg (original dose 8 mg, Cycle 8), 8 mg (original dose 8 mg, Cycle 12)  Administration: 8 mg (7/22/2020), 8 mg (8/13/2020)  bevacizumab (AVASTIN) 15 mg/kg = 1,615 mg in sodium chloride 0.9% 100 mL chemo infusion, 15 mg/kg = 1,615 mg, Intravenous, Clinic/HOD 1 time, 11 of 17 cycles  Administration: 1,615 mg (10/9/2019), 1,615 mg (10/29/2019), 1,615 mg (12/10/2019), 1,615 mg (1/21/2020), 1,600 mg (4/2/2020), 1,615 mg (4/23/2020), 1,600 mg (7/1/2020), 1,600 mg (7/22/2020), 1,600 mg (8/13/2020), 1,795 mg (9/3/2020), 1,795 mg (9/24/2020)     9/29/2023 -  Chemotherapy    Treatment Summary   Plan Name: OP GYN PACLITAXEL CARBOPLATIN (AUC 6) Q3W  Treatment Goal: Control  Status: Active  Start Date: 9/29/2023  End Date: 8/30/2024 (Planned)  Provider: Ismael Juarez MD  Chemotherapy: CARBOplatin (PARAPLATIN) 650 mg in sodium chloride 0.9% 335 mL chemo infusion, 650 mg (100 % of original dose 648 mg), Intravenous, Clinic/HOD 1 time, 1 of 17 cycles  Dose modification:   (original dose 648 mg, Cycle 1),   (Cycle 2)  Administration: 650 mg (9/29/2023)  PACLitaxeL (TAXOL) 175 mg/m2 = 402 mg in sodium chloride 0.9% 500 mL chemo infusion, 175 mg/m2 = 402 mg, Intravenous, Clinic/HOD 1 time, 1 of 17 cycles  Administration: 402 mg (9/29/2023)         Past Medical History:   Diagnosis Date    Anemia     Anxiety     Arthritis     knees    Cancer     ovarian    CHF (congestive heart failure)     Hx antineoplastic chemotherapy     last 6/2019    Hyperlipemia     Hypertension     Neck pain     Ovarian cancer 2019    CHEMO    Peritoneal carcinomatosis  2019      Past Surgical History:   Procedure Laterality Date    breast reduction  10/02/2018    CATARACT EXTRACTION Bilateral     2023     SECTION      X 1    COLONOSCOPY N/A 2021    Procedure: COLONOSCOPY;  Surgeon: Paulette Rojas MD;  Location[facility];  Service: Gastroenterology;  Laterality: N/A;    CYSTOSCOPY W/ URETERAL STENT PLACEMENT Right 2019    Procedure: CYSTOSCOPY, WITH URETERAL STENT INSERTION;  Surgeon: Timmy Santiago IV, MD;  Location: [facility] OR;  Service: Urology;  Laterality: Right;    CYSTOSCOPY W/ URETERAL STENT REMOVAL  10/04/2019    DILATION AND CURETTAGE OF UTERUS      HYSTERECTOMY      RALH for fibroids (still has ovaries)    INSERTION OF VENOUS ACCESS PORT Left 2019    Procedure: INSERTION, VENOUS ACCESS PORT;  Surgeon: Ulisses Monzon MD;  Location: [facility];  Service: General;  Laterality: Left;  Left internal jugular     LYSIS OF ADHESIONS OF URETER N/A 08/15/2019    Procedure: URETEROLYSIS;  Surgeon: Ismael Juarez MD;  Location: [facility] [place], operated by [facility] OR;  Service: OB/GYN;  Laterality: N/A;    OMENTECTOMY N/A 08/15/2019    Procedure: OMENTECTOMY;  Surgeon: Ismael Juarez MD;  Location: [facility] [place], operated by [facility] OR;  Service: OB/GYN;  Laterality: N/A;    RETROGRADE PYELOGRAPHY Right 2019    Procedure: PYELOGRAM, RETROGRADE;  Surgeon: Timmy Santiago IV, MD;  Location: [facility] OR;  Service: Urology;  Laterality: Right;    ROBOT-ASSISTED LAPAROSCOPIC SALPINGO-OOPHORECTOMY USING DA TIA XI Bilateral 08/15/2019    Procedure: XI ROBOTIC SALPINGO-OOPHORECTOMY;  Surgeon: Ismael Juarez MD;  Location: [facility];  Service: OB/GYN;  Laterality: Bilateral;    TOTAL REDUCTION MAMMOPLASTY  2018    TUBAL LIGATION       Social History     Socioeconomic History    Marital status:    Tobacco Use    Smoking status: Former     Current packs/day: 0.00     Average packs/day: 1 pack/day for 25.0 years (25.0 ttl pk-yrs)     Types: Cigarettes     Start date: 10/1/1993     Quit date: 10/1/2018      Years since quittin.0    Smokeless tobacco: Never    Tobacco comments:     States started quit 2 months ago after 30 years   Substance and Sexual Activity    Alcohol use: Yes     Comment: occasionally  No alcohol 72h prior to sx    Drug use: No    Sexual activity: Not Currently     Partners: Male     Comment: hyst; mut monog   Social History Narrative    Live w/ spouse and 2 dogs      Social Determinants of Health     Stress: No Stress Concern Present (2019)    Citizen of [place] of Occupational Health - Occupational Stress Questionnaire     Feeling of Stress : Only a little      Family History   Problem Relation Age of Onset    Glaucoma Mother     Colon cancer Brother     Breast cancer Maternal Aunt     Breast cancer Paternal Aunt     Ovarian cancer Paternal Aunt     Anesthesia problems Other     Thrombophilia Neg Hx           Review of patient's allergies indicates:  No Known Allergies     Review of Systems   Constitutional:  Positive for activity change and fatigue. Negative for chills and fever.   HENT: Negative.     Eyes: Negative.    Respiratory:  Negative for cough and shortness of breath.    Cardiovascular:  Negative for chest pain and leg swelling.   Gastrointestinal:  Positive for abdominal pain and nausea. Negative for constipation, diarrhea and vomiting.   Endocrine: Negative.    Genitourinary: Negative.    Musculoskeletal:  Negative for arthralgias and myalgias.   Integumentary:  Negative.   Allergic/Immunologic: Negative.    Neurological:  Negative for light-headedness, numbness and headaches.   Hematological: Negative.    Psychiatric/Behavioral: Negative.           Objective:        Vitals:    10/19/23 1035   BP: 105/64   Pulse: 71   Temp: 97.6 °F (36.4 °C)            Physical Exam  Constitutional:       Appearance: Normal appearance.   HENT:      Head: Atraumatic.   Eyes:      Extraocular Movements: Extraocular movements intact.   Pulmonary:      Effort: Pulmonary effort is normal. No  respiratory distress.   Neurological:      Mental Status: She is alert and oriented to person, place, and time.   Psychiatric:         Mood and Affect: Mood normal.         Behavior: Behavior normal.           LABS / IMAGING      - 08/21/2023 CT CAP: Marked interval disease progression with small right pleural effusion and severe peritoneal carcinomatosis. There is a large left lower quadrant parietal peritoneal implant measuring 3.4 x 1.9 cm. There are even nodular implants seen within the ventral abdominal wall small hernias. The left lower quadrant metastatic implant measures 4.0 x 5.6 x 4.7 cm (prior 1.3 x 1.0 x 1.2 cm). New left retroperitoneal nodule at the level of the aortic bifurcation measuring 1.7 x 1.5 cm.          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  Patient was then treated with Avastin maintenance  - 08/21/2023 CT CAP: Marked interval disease progression with severe peritoneal carcinomatosis as detailed above.  - : 7 >> 21 >> 32 >> 64 (9/2023)      Plan:     - Patient with recurrent platinum sensitive stage IV ovarian cancer started on carboplatin and paclitaxel on 09/29/2023, labs reviewed and will continue with 2nd cycle of treatment  - MD/LABS/TREATMENT - 3 WEEKS         Med Onc Chart Routing      Follow up with physician 3 weeks.   Follow up with LUIS    Infusion scheduling note   Chemotherapy tomorrow and in 3 weeks   Injection scheduling note    Labs CBC, CMP and magnesium   Scheduling:  Preferred lab:  Lab interval:  Labs before next visit   Imaging    Pharmacy appointment    Other referrals                    The patient was seen, interviewed and examined. Pertinent lab and radiology studies were reviewed. Pt instructed to call should develop concerning signs/symptoms or  have further questions.       Portions of the record may have been created with voice recognition software. Occasional wrong-word or sound-a-like substitutions may have occurred due to the inherent limitations of voice recognition software. Read the chart carefully and recognize, using context, where substitutions have occurred.    Orlando Rangel MD  Hematology / Oncology

## 2023-10-19 ENCOUNTER — LAB VISIT (OUTPATIENT)
Dept: LAB | Facility: HOSPITAL | Age: 68
End: 2023-10-19
Attending: INTERNAL MEDICINE
Payer: MEDICARE

## 2023-10-19 ENCOUNTER — OFFICE VISIT (OUTPATIENT)
Dept: HEMATOLOGY/ONCOLOGY | Facility: CLINIC | Age: 68
End: 2023-10-19
Payer: MEDICARE

## 2023-10-19 VITALS
WEIGHT: 243.25 LBS | SYSTOLIC BLOOD PRESSURE: 105 MMHG | OXYGEN SATURATION: 98 % | HEART RATE: 71 BPM | DIASTOLIC BLOOD PRESSURE: 64 MMHG | TEMPERATURE: 98 F | HEIGHT: 67 IN | BODY MASS INDEX: 38.18 KG/M2

## 2023-10-19 DIAGNOSIS — C78.6 PERITONEAL CARCINOMATOSIS: ICD-10-CM

## 2023-10-19 DIAGNOSIS — D84.821 IMMUNODEFICIENCY DUE TO CHEMOTHERAPY: Primary | ICD-10-CM

## 2023-10-19 DIAGNOSIS — T45.1X5A IMMUNODEFICIENCY DUE TO CHEMOTHERAPY: Primary | ICD-10-CM

## 2023-10-19 DIAGNOSIS — Z79.899 IMMUNODEFICIENCY DUE TO CHEMOTHERAPY: Primary | ICD-10-CM

## 2023-10-19 LAB
ALBUMIN SERPL BCP-MCNC: 3.6 G/DL (ref 3.5–5.2)
ALP SERPL-CCNC: 139 U/L (ref 55–135)
ALT SERPL W/O P-5'-P-CCNC: 41 U/L (ref 10–44)
ANION GAP SERPL CALC-SCNC: 10 MMOL/L (ref 8–16)
AST SERPL-CCNC: 27 U/L (ref 10–40)
BASOPHILS # BLD AUTO: 0.02 K/UL (ref 0–0.2)
BASOPHILS NFR BLD: 0.3 % (ref 0–1.9)
BILIRUB SERPL-MCNC: 0.3 MG/DL (ref 0.1–1)
BUN SERPL-MCNC: 16 MG/DL (ref 8–23)
CALCIUM SERPL-MCNC: 9.2 MG/DL (ref 8.7–10.5)
CHLORIDE SERPL-SCNC: 105 MMOL/L (ref 95–110)
CO2 SERPL-SCNC: 28 MMOL/L (ref 23–29)
CREAT SERPL-MCNC: 0.8 MG/DL (ref 0.5–1.4)
DIFFERENTIAL METHOD: ABNORMAL
EOSINOPHIL # BLD AUTO: 0.1 K/UL (ref 0–0.5)
EOSINOPHIL NFR BLD: 0.8 % (ref 0–8)
ERYTHROCYTE [DISTWIDTH] IN BLOOD BY AUTOMATED COUNT: 14.9 % (ref 11.5–14.5)
EST. GFR  (NO RACE VARIABLE): >60 ML/MIN/1.73 M^2
GLUCOSE SERPL-MCNC: 108 MG/DL (ref 70–110)
HCT VFR BLD AUTO: 35.7 % (ref 37–48.5)
HGB BLD-MCNC: 11.2 G/DL (ref 12–16)
IMM GRANULOCYTES # BLD AUTO: 0.01 K/UL (ref 0–0.04)
IMM GRANULOCYTES NFR BLD AUTO: 0.1 % (ref 0–0.5)
LYMPHOCYTES # BLD AUTO: 2.1 K/UL (ref 1–4.8)
LYMPHOCYTES NFR BLD: 26.4 % (ref 18–48)
MAGNESIUM SERPL-MCNC: 1.5 MG/DL (ref 1.6–2.6)
MCH RBC QN AUTO: 27.7 PG (ref 27–31)
MCHC RBC AUTO-ENTMCNC: 31.4 G/DL (ref 32–36)
MCV RBC AUTO: 88 FL (ref 82–98)
MONOCYTES # BLD AUTO: 0.5 K/UL (ref 0.3–1)
MONOCYTES NFR BLD: 6 % (ref 4–15)
NEUTROPHILS # BLD AUTO: 5.2 K/UL (ref 1.8–7.7)
NEUTROPHILS NFR BLD: 66.4 % (ref 38–73)
NRBC BLD-RTO: 0 /100 WBC
PLATELET # BLD AUTO: 223 K/UL (ref 150–450)
PMV BLD AUTO: 9.1 FL (ref 9.2–12.9)
POTASSIUM SERPL-SCNC: 3.8 MMOL/L (ref 3.5–5.1)
PROT SERPL-MCNC: 6.7 G/DL (ref 6–8.4)
RBC # BLD AUTO: 4.05 M/UL (ref 4–5.4)
SODIUM SERPL-SCNC: 143 MMOL/L (ref 136–145)
WBC # BLD AUTO: 7.87 K/UL (ref 3.9–12.7)

## 2023-10-19 PROCEDURE — 1101F PR PT FALLS ASSESS DOC 0-1 FALLS W/OUT INJ PAST YR: ICD-10-PCS | Mod: CPTII,95,, | Performed by: INTERNAL MEDICINE

## 2023-10-19 PROCEDURE — 1160F PR REVIEW ALL MEDS BY PRESCRIBER/CLIN PHARMACIST DOCUMENTED: ICD-10-PCS | Mod: CPTII,95,, | Performed by: INTERNAL MEDICINE

## 2023-10-19 PROCEDURE — 3008F BODY MASS INDEX DOCD: CPT | Mod: CPTII,95,, | Performed by: INTERNAL MEDICINE

## 2023-10-19 PROCEDURE — 99215 PR OFFICE/OUTPT VISIT, EST, LEVL V, 40-54 MIN: ICD-10-PCS | Mod: 95,,, | Performed by: INTERNAL MEDICINE

## 2023-10-19 PROCEDURE — 1126F AMNT PAIN NOTED NONE PRSNT: CPT | Mod: CPTII,95,, | Performed by: INTERNAL MEDICINE

## 2023-10-19 PROCEDURE — 3044F HG A1C LEVEL LT 7.0%: CPT | Mod: CPTII,95,, | Performed by: INTERNAL MEDICINE

## 2023-10-19 PROCEDURE — 1126F PR PAIN SEVERITY QUANTIFIED, NO PAIN PRESENT: ICD-10-PCS | Mod: CPTII,95,, | Performed by: INTERNAL MEDICINE

## 2023-10-19 PROCEDURE — 99215 OFFICE O/P EST HI 40 MIN: CPT | Mod: 95,,, | Performed by: INTERNAL MEDICINE

## 2023-10-19 PROCEDURE — 3074F SYST BP LT 130 MM HG: CPT | Mod: CPTII,95,, | Performed by: INTERNAL MEDICINE

## 2023-10-19 PROCEDURE — 1159F PR MEDICATION LIST DOCUMENTED IN MEDICAL RECORD: ICD-10-PCS | Mod: CPTII,95,, | Performed by: INTERNAL MEDICINE

## 2023-10-19 PROCEDURE — 3008F PR BODY MASS INDEX (BMI) DOCUMENTED: ICD-10-PCS | Mod: CPTII,95,, | Performed by: INTERNAL MEDICINE

## 2023-10-19 PROCEDURE — 36415 COLL VENOUS BLD VENIPUNCTURE: CPT | Performed by: INTERNAL MEDICINE

## 2023-10-19 PROCEDURE — 1101F PT FALLS ASSESS-DOCD LE1/YR: CPT | Mod: CPTII,95,, | Performed by: INTERNAL MEDICINE

## 2023-10-19 PROCEDURE — 3078F PR MOST RECENT DIASTOLIC BLOOD PRESSURE < 80 MM HG: ICD-10-PCS | Mod: CPTII,95,, | Performed by: INTERNAL MEDICINE

## 2023-10-19 PROCEDURE — 3288F PR FALLS RISK ASSESSMENT DOCUMENTED: ICD-10-PCS | Mod: CPTII,95,, | Performed by: INTERNAL MEDICINE

## 2023-10-19 PROCEDURE — 3074F PR MOST RECENT SYSTOLIC BLOOD PRESSURE < 130 MM HG: ICD-10-PCS | Mod: CPTII,95,, | Performed by: INTERNAL MEDICINE

## 2023-10-19 PROCEDURE — 1159F MED LIST DOCD IN RCRD: CPT | Mod: CPTII,95,, | Performed by: INTERNAL MEDICINE

## 2023-10-19 PROCEDURE — 83735 ASSAY OF MAGNESIUM: CPT | Performed by: INTERNAL MEDICINE

## 2023-10-19 PROCEDURE — 3288F FALL RISK ASSESSMENT DOCD: CPT | Mod: CPTII,95,, | Performed by: INTERNAL MEDICINE

## 2023-10-19 PROCEDURE — 3078F DIAST BP <80 MM HG: CPT | Mod: CPTII,95,, | Performed by: INTERNAL MEDICINE

## 2023-10-19 PROCEDURE — 1160F RVW MEDS BY RX/DR IN RCRD: CPT | Mod: CPTII,95,, | Performed by: INTERNAL MEDICINE

## 2023-10-19 PROCEDURE — 80053 COMPREHEN METABOLIC PANEL: CPT | Performed by: INTERNAL MEDICINE

## 2023-10-19 PROCEDURE — 85025 COMPLETE CBC W/AUTO DIFF WBC: CPT | Performed by: INTERNAL MEDICINE

## 2023-10-19 PROCEDURE — 3044F PR MOST RECENT HEMOGLOBIN A1C LEVEL <7.0%: ICD-10-PCS | Mod: CPTII,95,, | Performed by: INTERNAL MEDICINE

## 2023-10-19 RX ORDER — SODIUM CHLORIDE 0.9 % (FLUSH) 0.9 %
10 SYRINGE (ML) INJECTION
Status: CANCELLED | OUTPATIENT
Start: 2023-10-20

## 2023-10-19 RX ORDER — HEPARIN 100 UNIT/ML
500 SYRINGE INTRAVENOUS
Status: CANCELLED | OUTPATIENT
Start: 2023-10-20

## 2023-10-19 RX ORDER — PANTOPRAZOLE SODIUM 40 MG/1
40 TABLET, DELAYED RELEASE ORAL DAILY
Qty: 90 TABLET | Refills: 1 | Status: SHIPPED | OUTPATIENT
Start: 2023-10-19 | End: 2024-10-18

## 2023-10-19 RX ORDER — DIPHENHYDRAMINE HYDROCHLORIDE 50 MG/ML
50 INJECTION INTRAMUSCULAR; INTRAVENOUS ONCE AS NEEDED
Status: CANCELLED | OUTPATIENT
Start: 2023-10-20

## 2023-10-19 RX ORDER — FAMOTIDINE 10 MG/ML
20 INJECTION INTRAVENOUS
Status: CANCELLED | OUTPATIENT
Start: 2023-10-20

## 2023-10-19 RX ORDER — EPINEPHRINE 0.3 MG/.3ML
0.3 INJECTION SUBCUTANEOUS ONCE AS NEEDED
Status: CANCELLED | OUTPATIENT
Start: 2023-10-20

## 2023-10-19 RX ORDER — PROCHLORPERAZINE EDISYLATE 5 MG/ML
5 INJECTION INTRAMUSCULAR; INTRAVENOUS ONCE AS NEEDED
Status: CANCELLED
Start: 2023-10-20

## 2023-10-20 ENCOUNTER — INFUSION (OUTPATIENT)
Dept: INFUSION THERAPY | Facility: HOSPITAL | Age: 68
End: 2023-10-20
Attending: INTERNAL MEDICINE
Payer: MEDICARE

## 2023-10-20 VITALS
SYSTOLIC BLOOD PRESSURE: 118 MMHG | RESPIRATION RATE: 18 BRPM | WEIGHT: 241.63 LBS | DIASTOLIC BLOOD PRESSURE: 67 MMHG | HEART RATE: 79 BPM | BODY MASS INDEX: 37.92 KG/M2 | TEMPERATURE: 97 F | HEIGHT: 67 IN | OXYGEN SATURATION: 97 %

## 2023-10-20 DIAGNOSIS — R53.1 WEAKNESS: ICD-10-CM

## 2023-10-20 DIAGNOSIS — C78.6 PERITONEAL CARCINOMATOSIS: ICD-10-CM

## 2023-10-20 DIAGNOSIS — C56.3 OVARIAN CANCER, BILATERAL: Primary | ICD-10-CM

## 2023-10-20 DIAGNOSIS — E66.01 MORBID OBESITY: ICD-10-CM

## 2023-10-20 LAB — GLUCOSE SERPL-MCNC: 155 MG/DL (ref 70–110)

## 2023-10-20 PROCEDURE — 63600175 PHARM REV CODE 636 W HCPCS: Performed by: INTERNAL MEDICINE

## 2023-10-20 PROCEDURE — 25000003 PHARM REV CODE 250: Performed by: INTERNAL MEDICINE

## 2023-10-20 PROCEDURE — 96367 TX/PROPH/DG ADDL SEQ IV INF: CPT

## 2023-10-20 PROCEDURE — 96417 CHEMO IV INFUS EACH ADDL SEQ: CPT

## 2023-10-20 PROCEDURE — 96375 TX/PRO/DX INJ NEW DRUG ADDON: CPT

## 2023-10-20 PROCEDURE — 96368 THER/DIAG CONCURRENT INF: CPT

## 2023-10-20 PROCEDURE — 96415 CHEMO IV INFUSION ADDL HR: CPT

## 2023-10-20 PROCEDURE — 96413 CHEMO IV INFUSION 1 HR: CPT

## 2023-10-20 RX ORDER — MAGNESIUM SULFATE 1 G/100ML
1 INJECTION INTRAVENOUS
Status: COMPLETED | OUTPATIENT
Start: 2023-10-20 | End: 2023-10-20

## 2023-10-20 RX ORDER — DIPHENHYDRAMINE HYDROCHLORIDE 50 MG/ML
50 INJECTION INTRAMUSCULAR; INTRAVENOUS ONCE AS NEEDED
Status: DISCONTINUED | OUTPATIENT
Start: 2023-10-20 | End: 2023-10-20 | Stop reason: HOSPADM

## 2023-10-20 RX ORDER — SODIUM CHLORIDE 0.9 % (FLUSH) 0.9 %
10 SYRINGE (ML) INJECTION
Status: DISCONTINUED | OUTPATIENT
Start: 2023-10-20 | End: 2023-10-20 | Stop reason: HOSPADM

## 2023-10-20 RX ORDER — HEPARIN 100 UNIT/ML
500 SYRINGE INTRAVENOUS
Status: DISCONTINUED | OUTPATIENT
Start: 2023-10-20 | End: 2023-10-20 | Stop reason: HOSPADM

## 2023-10-20 RX ORDER — FAMOTIDINE 10 MG/ML
20 INJECTION INTRAVENOUS
Status: COMPLETED | OUTPATIENT
Start: 2023-10-20 | End: 2023-10-20

## 2023-10-20 RX ORDER — PROCHLORPERAZINE EDISYLATE 5 MG/ML
5 INJECTION INTRAMUSCULAR; INTRAVENOUS ONCE AS NEEDED
Status: COMPLETED | OUTPATIENT
Start: 2023-10-20 | End: 2023-10-20

## 2023-10-20 RX ADMIN — DIPHENHYDRAMINE HYDROCHLORIDE 50 MG: 50 INJECTION, SOLUTION INTRAMUSCULAR; INTRAVENOUS at 08:10

## 2023-10-20 RX ADMIN — HEPARIN 500 UNITS: 100 SYRINGE at 02:10

## 2023-10-20 RX ADMIN — PACLITAXEL 402 MG: 6 INJECTION, SOLUTION INTRAVENOUS at 09:10

## 2023-10-20 RX ADMIN — HYDROCORTISONE SODIUM SUCCINATE 100 MG: 100 INJECTION, POWDER, FOR SOLUTION INTRAMUSCULAR; INTRAVENOUS at 01:10

## 2023-10-20 RX ADMIN — PROCHLORPERAZINE EDISYLATE 5 MG: 5 INJECTION INTRAMUSCULAR; INTRAVENOUS at 01:10

## 2023-10-20 RX ADMIN — MAGNESIUM SULFATE HEPTAHYDRATE 1 G: 1 INJECTION, SOLUTION INTRAVENOUS at 10:10

## 2023-10-20 RX ADMIN — CARBOPLATIN 710 MG: 10 INJECTION, SOLUTION INTRAVENOUS at 12:10

## 2023-10-20 RX ADMIN — SODIUM CHLORIDE 500 ML: 9 INJECTION, SOLUTION INTRAVENOUS at 12:10

## 2023-10-20 RX ADMIN — APREPITANT 130 MG: 130 INJECTION, EMULSION INTRAVENOUS at 08:10

## 2023-10-20 RX ADMIN — FAMOTIDINE 20 MG: 10 INJECTION, SOLUTION INTRAVENOUS at 08:10

## 2023-10-20 RX ADMIN — DEXAMETHASONE SODIUM PHOSPHATE 0.25 MG: 4 INJECTION, SOLUTION INTRA-ARTICULAR; INTRALESIONAL; INTRAMUSCULAR; INTRAVENOUS; SOFT TISSUE at 09:10

## 2023-10-20 NOTE — NURSING
"Patient was sitting up and leaning over edge of chair when SHAHLA Perla walked by and patient motioned for her to come over.     Patient reports feeling Nauseated and hard to breath. Patient diaphoretic. Cool rag applied to head.   Vitals signs-- BP 78/46,  HR 80, Resp 20, Temp. 97.0, Oxygen saturation 100% Room air.   1315: Ty at chairside. Patient still reporting nausea and difficulty breathing. Oxygen applied to patient per NC  1319--5 mg of prochlorperazine given.    1321 vital signs: BP-105/56, HR-82, Resp-20, oxygen saturation 97% 4 L NC.   Blood glucose--155. Patient reports feeling slightly better.      Jennifer Pineda NP ordered to finish rest of 500 ml bag post fluids and monitor patient for a while before discharge.     1331: /58, Temp. 97.7, HR 95% on 4 L NC, HR 81, Resp 20.    1334: patient ambulated to restroom with oxygen on 4 L NC.     1348: patient returned from the restroom reporting and redness to bilateral arms.  Ty notified. Instructed to give 100 mg of solucortef from the treatment plan to see if itching will resolved. Patient report symptoms or Nausea and difficulty to breath have resolved.   100 mg solucortef given @ 1355    Notified ASHLEIGH Rodriguez @ 0238  patient is off oxygen, no longer reporting nausea or difficulty breathing. redness and itching to bilateral arms have also resolved. patient feels comfortable leaving at this time.  /67, HR 79 resp 18 oxygen level 97 room air, temp 97.1    Jennifer stated, "yes okay to d/c. please let her know if SOB, CP, rash, dizziness or any other unusual symptoms recur please go to ER."    Patient verbalized understanding. No further questions at this time and patient feels comfortable leaving at this time. Discharge in no distress noted.              "

## 2023-10-20 NOTE — DISCHARGE INSTRUCTIONS
.[facility]  [address] [address]  [address]  [phone] phone     [phone] fax  Hours of Operation: Monday- Friday 8:00am- 5:00pm  After hours phone  [phone]  Hematology / Oncology Physicians on call    Dr. Varinder Rangel      Nurse Practitioners:    Carole Perrin, ASHLEIGH Pineda, ASHLEIGH Pal, ASHLEIGH Orellana, ASHLEIGH Vera, PA      Please don't hesitate to call if you have any concerns.     .WAYS TO HELP PREVENT INFECTION        WASH YOUR HANDS OFTEN DURING THE DAY, ESPECIALLY BEFORE YOU EAT, AFTER USING THE BATHROOM, AND AFTER TOUCHING ANIMALS    STAY AWAY FROM PEOPLE WHO HAVE ILLNESSES YOU CAN CATCH; SUCH AS COLDS, FLU, CHICKEN POX    TRY TO AVOID CROWDS    STAY AWAY FROM CHILDREN WHO RECENTLY HAVE RECEIVED LIVE VIRUS VACCINES    MAINTAIN GOOD MOUTH CARE    DO NOT SQUEEZE OR SCRATCH PIMPLES    CLEAN CUTS & SCRAPES RIGHT AWAY AND DAILY UNTIL HEALED WITH WARM WATER, SOAP & AN ANTISEPTIC    AVOID CONTACT WITH LITTER BOXES, BIRD CAGES, & FISH TANKS    AVOID STANDING WATER, IE., BIRD BATHS, FLOWER POTS/VASES, OR HUMIDIFIERS    WEAR GLOVES WHEN GARDENING OR CLEANING UP AFTER OTHERS, ESPECIALLY BABIES & SMALL CHILDREN    DO NOT EAT RAW FISH, SEAFOOD, MEAT, OR EGGS     .FALL PREVENTION   Falls often occur due to slipping, tripping or losing your balance. Here are ways to reduce your risk of falling again.   Was there anything that caused your fall that can be fixed, removed or replaced?   Make your home safe by keeping walkways clear of objects you may trip over.   Use non-slip pads under rugs.   Do not walk in poorly lit areas.   Do not stand on chairs or wobbly ladders.   Use caution when reaching overhead or looking upward. This position can cause a loss of balance.   Be sure your shoes fit properly, have non-slip bottoms and are in good condition.   Be cautious when going up and down stairs, curbs,  and when walking on uneven sidewalks.   If your balance is poor, consider using a cane or walker.   If your fall was related to alcohol use, stop or limit alcohol intake.   If your fall was related to use of sleeping medicines, talk to your doctor about this. You may need to reduce your dosage at bedtime if you awaken during the night to go to the bathroom.   To reduce the need for nighttime bathroom trips:   Avoid drinking fluids for several hours before going to bed   Empty your bladder before going to bed   Men can keep a urinal at the bedside   © 9987-5374 [employer] \[facility]\"", [address] [place] [address]. All rights reserved. This information is not intended as a substitute for professional medical care. Always follow your healthcare professional's instructions.

## 2023-10-20 NOTE — PLAN OF CARE
Patient reports feeling tired today. Patient had reaction at the end of carbo infusion. See nursing notes for details. Patient to return in 3 weeks for next treatment.

## 2023-11-09 ENCOUNTER — OFFICE VISIT (OUTPATIENT)
Dept: HEMATOLOGY/ONCOLOGY | Facility: CLINIC | Age: 68
End: 2023-11-09
Payer: MEDICARE

## 2023-11-09 ENCOUNTER — HOSPITAL ENCOUNTER (OUTPATIENT)
Dept: RADIOLOGY | Facility: HOSPITAL | Age: 68
Discharge: HOME | End: 2023-11-09
Attending: NURSE PRACTITIONER
Payer: MEDICARE

## 2023-11-09 VITALS
DIASTOLIC BLOOD PRESSURE: 73 MMHG | BODY MASS INDEX: 39.39 KG/M2 | TEMPERATURE: 98 F | HEIGHT: 67 IN | OXYGEN SATURATION: 97 % | WEIGHT: 251 LBS | SYSTOLIC BLOOD PRESSURE: 141 MMHG | HEART RATE: 81 BPM

## 2023-11-09 DIAGNOSIS — R22.42 LOCALIZED SWELLING OF LEFT LOWER LEG: Primary | ICD-10-CM

## 2023-11-09 DIAGNOSIS — C56.3 OVARIAN CANCER, BILATERAL: ICD-10-CM

## 2023-11-09 DIAGNOSIS — E66.01 MORBID OBESITY: ICD-10-CM

## 2023-11-09 DIAGNOSIS — Z51.11 ENCOUNTER FOR ANTINEOPLASTIC CHEMOTHERAPY: ICD-10-CM

## 2023-11-09 DIAGNOSIS — R22.42 LOCALIZED SWELLING OF LEFT LOWER LEG: ICD-10-CM

## 2023-11-09 DIAGNOSIS — C78.6 PERITONEAL CARCINOMATOSIS: ICD-10-CM

## 2023-11-09 PROCEDURE — 1159F MED LIST DOCD IN RCRD: CPT | Mod: CPTII,S$GLB,, | Performed by: NURSE PRACTITIONER

## 2023-11-09 PROCEDURE — 99999 PR PBB SHADOW E&M-EST. PATIENT-LVL V: CPT | Mod: PBBFAC,,, | Performed by: NURSE PRACTITIONER

## 2023-11-09 PROCEDURE — 3044F HG A1C LEVEL LT 7.0%: CPT | Mod: CPTII,S$GLB,, | Performed by: NURSE PRACTITIONER

## 2023-11-09 PROCEDURE — 99999 PR PBB SHADOW E&M-EST. PATIENT-LVL V: ICD-10-PCS | Mod: PBBFAC,,, | Performed by: NURSE PRACTITIONER

## 2023-11-09 PROCEDURE — 3008F PR BODY MASS INDEX (BMI) DOCUMENTED: ICD-10-PCS | Mod: CPTII,S$GLB,, | Performed by: NURSE PRACTITIONER

## 2023-11-09 PROCEDURE — 99214 PR OFFICE/OUTPT VISIT, EST, LEVL IV, 30-39 MIN: ICD-10-PCS | Mod: 25,S$GLB,, | Performed by: NURSE PRACTITIONER

## 2023-11-09 PROCEDURE — 1101F PT FALLS ASSESS-DOCD LE1/YR: CPT | Mod: CPTII,S$GLB,, | Performed by: NURSE PRACTITIONER

## 2023-11-09 PROCEDURE — 1126F PR PAIN SEVERITY QUANTIFIED, NO PAIN PRESENT: ICD-10-PCS | Mod: CPTII,S$GLB,, | Performed by: NURSE PRACTITIONER

## 2023-11-09 PROCEDURE — 99214 OFFICE O/P EST MOD 30 MIN: CPT | Mod: 25,S$GLB,, | Performed by: NURSE PRACTITIONER

## 2023-11-09 PROCEDURE — 93971 EXTREMITY STUDY: CPT | Mod: 26,LT,, | Performed by: RADIOLOGY

## 2023-11-09 PROCEDURE — 3078F DIAST BP <80 MM HG: CPT | Mod: CPTII,S$GLB,, | Performed by: NURSE PRACTITIONER

## 2023-11-09 PROCEDURE — 1160F PR REVIEW ALL MEDS BY PRESCRIBER/CLIN PHARMACIST DOCUMENTED: ICD-10-PCS | Mod: CPTII,S$GLB,, | Performed by: NURSE PRACTITIONER

## 2023-11-09 PROCEDURE — 3288F PR FALLS RISK ASSESSMENT DOCUMENTED: ICD-10-PCS | Mod: CPTII,S$GLB,, | Performed by: NURSE PRACTITIONER

## 2023-11-09 PROCEDURE — 3044F PR MOST RECENT HEMOGLOBIN A1C LEVEL <7.0%: ICD-10-PCS | Mod: CPTII,S$GLB,, | Performed by: NURSE PRACTITIONER

## 2023-11-09 PROCEDURE — 3288F FALL RISK ASSESSMENT DOCD: CPT | Mod: CPTII,S$GLB,, | Performed by: NURSE PRACTITIONER

## 2023-11-09 PROCEDURE — 3008F BODY MASS INDEX DOCD: CPT | Mod: CPTII,S$GLB,, | Performed by: NURSE PRACTITIONER

## 2023-11-09 PROCEDURE — 93971 EXTREMITY STUDY: CPT | Mod: TC,LT

## 2023-11-09 PROCEDURE — 3078F PR MOST RECENT DIASTOLIC BLOOD PRESSURE < 80 MM HG: ICD-10-PCS | Mod: CPTII,S$GLB,, | Performed by: NURSE PRACTITIONER

## 2023-11-09 PROCEDURE — 93971 US LOWER EXTREMITY VEINS LEFT: ICD-10-PCS | Mod: 26,LT,, | Performed by: RADIOLOGY

## 2023-11-09 PROCEDURE — 3077F PR MOST RECENT SYSTOLIC BLOOD PRESSURE >= 140 MM HG: ICD-10-PCS | Mod: CPTII,S$GLB,, | Performed by: NURSE PRACTITIONER

## 2023-11-09 PROCEDURE — 1160F RVW MEDS BY RX/DR IN RCRD: CPT | Mod: CPTII,S$GLB,, | Performed by: NURSE PRACTITIONER

## 2023-11-09 PROCEDURE — 1101F PR PT FALLS ASSESS DOC 0-1 FALLS W/OUT INJ PAST YR: ICD-10-PCS | Mod: CPTII,S$GLB,, | Performed by: NURSE PRACTITIONER

## 2023-11-09 PROCEDURE — 1159F PR MEDICATION LIST DOCUMENTED IN MEDICAL RECORD: ICD-10-PCS | Mod: CPTII,S$GLB,, | Performed by: NURSE PRACTITIONER

## 2023-11-09 PROCEDURE — 1126F AMNT PAIN NOTED NONE PRSNT: CPT | Mod: CPTII,S$GLB,, | Performed by: NURSE PRACTITIONER

## 2023-11-09 PROCEDURE — 3077F SYST BP >= 140 MM HG: CPT | Mod: CPTII,S$GLB,, | Performed by: NURSE PRACTITIONER

## 2023-11-09 RX ORDER — SODIUM CHLORIDE 0.9 % (FLUSH) 0.9 %
10 SYRINGE (ML) INJECTION
Status: CANCELLED | OUTPATIENT
Start: 2023-11-10

## 2023-11-09 RX ORDER — FAMOTIDINE 10 MG/ML
20 INJECTION INTRAVENOUS
Status: CANCELLED | OUTPATIENT
Start: 2023-11-10

## 2023-11-09 RX ORDER — DIPHENHYDRAMINE HYDROCHLORIDE 50 MG/ML
50 INJECTION INTRAMUSCULAR; INTRAVENOUS ONCE AS NEEDED
Status: CANCELLED | OUTPATIENT
Start: 2023-11-10

## 2023-11-09 RX ORDER — EPINEPHRINE 0.3 MG/.3ML
0.3 INJECTION SUBCUTANEOUS ONCE AS NEEDED
Status: CANCELLED | OUTPATIENT
Start: 2023-11-10

## 2023-11-09 RX ORDER — PROCHLORPERAZINE EDISYLATE 5 MG/ML
5 INJECTION INTRAMUSCULAR; INTRAVENOUS ONCE AS NEEDED
Status: CANCELLED
Start: 2023-11-10

## 2023-11-09 RX ORDER — HEPARIN 100 UNIT/ML
500 SYRINGE INTRAVENOUS
Status: CANCELLED | OUTPATIENT
Start: 2023-11-10

## 2023-11-09 NOTE — PROGRESS NOTES
Subjective:       Patient ID: Casie Gaines is a 67 y.o. female.    Chief Complaint: review labs. 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 Patient initiated Carboplatin/Taxol 2023    Today she presents for follow up lab review and consideration of Taxol/Carboplatin planned in am. She notes tolerating well overall. She does note increasing swelling to left lower extremity since initiating chemotherapy. Denies pain. No notable erythema     Social History     Socioeconomic History    Marital status:    Tobacco Use    Smoking status: Former     Current packs/day: 0.00     Average packs/day: 1 pack/day for 25.0 years (25.0 ttl pk-yrs)     Types: Cigarettes     Start date: 10/1/1993     Quit date: 10/1/2018     Years since quittin.1    Smokeless tobacco: Never    Tobacco comments:     States started quit 2 months ago after 30 years   Substance and Sexual Activity    Alcohol use: Yes     Comment: occasionally  No alcohol 72h prior to sx    Drug use: No    Sexual activity: Not Currently     Partners: Male     Comment: hyst; mut monog   Social History Narrative    Live w/ spouse and 2 dogs      Social Determinants of Health     Stress: No Stress Concern Present (2019)    [employer] - Occupational Stress Questionnaire     Feeling of Stress : Only a little       Past Medical History:   Diagnosis Date    Anemia     Anxiety     Arthritis     knees    Cancer     ovarian    CHF  (congestive heart failure)     Hx antineoplastic chemotherapy     last 2019    Hyperlipemia     Hypertension     Neck pain     Ovarian cancer 2019    CHEMO    Peritoneal carcinomatosis 2019       Family History   Problem Relation Age of Onset    Glaucoma Mother     Colon cancer Brother     Breast cancer Maternal Aunt     Breast cancer Paternal Aunt     Ovarian cancer Paternal Aunt     Anesthesia problems Other     Thrombophilia Neg Hx        Past Surgical History:   Procedure Laterality Date    breast reduction  10/02/2018    CATARACT EXTRACTION Bilateral     2023     SECTION      X 1    COLONOSCOPY N/A 2021    Procedure: COLONOSCOPY;  Surgeon: Paulette Rojas MD;  Location: [facility] ENDO;  Service: Gastroenterology;  Laterality: N/A;    CYSTOSCOPY W/ URETERAL STENT PLACEMENT Right 2019    Procedure: CYSTOSCOPY, WITH URETERAL STENT INSERTION;  Surgeon: Timmy Santiago IV, MD;  Location: [facility] OR;  Service: Urology;  Laterality: Right;    CYSTOSCOPY W/ URETERAL STENT REMOVAL  10/04/2019    DILATION AND CURETTAGE OF UTERUS      HYSTERECTOMY      RALH for fibroids (still has ovaries)    INSERTION OF VENOUS ACCESS PORT Left 2019    Procedure: INSERTION, VENOUS ACCESS PORT;  Surgeon: Ulisses Monzon MD;  Location: [facility] OR;  Service: General;  Laterality: Left;  Left internal jugular     LYSIS OF ADHESIONS OF URETER N/A 08/15/2019    Procedure: URETEROLYSIS;  Surgeon: Ismael Juarez MD;  Location: [facility] OR;  Service: OB/GYN;  Laterality: N/A;    OMENTECTOMY N/A 08/15/2019    Procedure: OMENTECTOMY;  Surgeon: Ismael Juarez MD;  Location: [facility] OR;  Service: OB/GYN;  Laterality: N/A;    RETROGRADE PYELOGRAPHY Right 2019    Procedure: PYELOGRAM, RETROGRADE;  Surgeon: Timmy Santiago IV, MD;  Location: [facility] OR;  Service: Urology;  Laterality: Right;    ROBOT-ASSISTED LAPAROSCOPIC SALPINGO-OOPHORECTOMY USING DA TIA XI Bilateral 08/15/2019    Procedure: XI ROBOTIC  SALPINGO-OOPHORECTOMY;  Surgeon: Ismael Juarez MD;  Location: [facility];  Service: OB/GYN;  Laterality: Bilateral;    TOTAL REDUCTION MAMMOPLASTY  2018    TUBAL LIGATION         Review of Systems   Constitutional:  Negative for activity change, appetite change, chills, fatigue, fever and unexpected weight change.   HENT:  Negative for congestion, mouth sores, nosebleeds, sore throat, trouble swallowing and voice change.    Respiratory:  Negative for cough, chest tightness, shortness of breath and wheezing.    Cardiovascular:  Positive for leg swelling. Negative for chest pain and palpitations.   Gastrointestinal:  Negative for abdominal distention, abdominal pain, blood in stool, constipation, diarrhea, nausea and vomiting.   Genitourinary:  Negative for difficulty urinating, dysuria and hematuria.   Musculoskeletal:  Negative for arthralgias, back pain and myalgias.   Skin:  Negative for pallor, rash and wound.   Neurological:  Negative for dizziness, syncope, weakness and headaches.   Hematological:  Negative for adenopathy. Does not bruise/bleed easily.   Psychiatric/Behavioral:  The patient is nervous/anxious.          Medication List with Changes/Refills   Current Medications    ALBUTEROL 90 MCG/ACTUATION INHALER    Inhale 2 puffs into the lungs every 4 (four) hours as needed for Wheezing. Rescue    ALPRAZOLAM (XANAX) 0.25 MG TABLET    Take 1 tablet (0.25 mg total) by mouth 3 (three) times daily as needed for Anxiety.    AMLODIPINE (NORVASC) 5 MG TABLET    Take 2 tablets (10 mg total) by mouth once daily.    ATENOLOL (TENORMIN) 25 MG TABLET    Take 1 tablet (25 mg total) by mouth once daily.    AZELASTINE (ASTELIN) 137 MCG (0.1 %) NASAL SPRAY    1 spray (137 mcg total) by Nasal route 2 (two) times daily.    BIOTIN 1 MG TABLET    Take 1,000 mcg by mouth once daily.     CETIRIZINE (ZYRTEC) 10 MG TABLET    Take 1 tablet (10 mg total) by mouth once daily.    DEXAMETHASONE (DECADRON) 4 MG TAB    Take 2 tablets (8 mg  total) by mouth once daily. Take as directed on days 2, 3 and 4 of your chemotherapy cycle.    DICLOFENAC (VOLTAREN) 75 MG EC TABLET    Take 1 tablet (75 mg total) by mouth 2 (two) times daily.    DICLOFENAC SODIUM (VOLTAREN) 1 % GEL    Apply once a day to back as needed    EPINEPHRINE (EPIPEN) 0.3 MG/0.3 ML ATIN    Inject 0.3 mLs (0.3 mg total) into the muscle once. for 1 dose    FLUTICASONE PROPIONATE (FLONASE) 50 MCG/ACTUATION NASAL SPRAY    1 spray (50 mcg total) by Each Nostril route every 12 (twelve) hours as needed for Rhinitis.    GABAPENTIN (NEURONTIN) 100 MG CAPSULE    Take 1 capsule (100 mg total) by mouth 3 (three) times daily.    GINKGO BILOBA 40 MG TAB    Take 40 mg by mouth.    HYDROXYZINE HCL (ATARAX) 25 MG TABLET    Take 1 tablet (25 mg total) by mouth 3 (three) times daily as needed for Itching.    MULTIVITAMIN WITH MINERALS TABLET    Take 1 tablet by mouth once daily.     OLANZAPINE (ZYPREXA) 5 MG TABLET    Take 1 tablet (5 mg total) by mouth every evening on days 1 through 4 of your chemotherapy regimen.    OLMESARTAN-HYDROCHLOROTHIAZIDE (BENICAR HCT) 40-25 MG PER TABLET    Take 1 tablet by mouth once daily.    ONDANSETRON (ZOFRAN-ODT) 8 MG TBDL    Dissolve 1 tablet (8 mg total) under the tongue every 6 (six) hours as needed.    PANTOPRAZOLE (PROTONIX) 40 MG TABLET    Take 1 tablet (40 mg total) by mouth once daily.    PROCHLORPERAZINE (COMPAZINE) 5 MG TABLET    Take 2 tablets (10 mg total) by mouth every 6 (six) hours as needed (nausea or vomiting).    ROSUVASTATIN (CRESTOR) 10 MG TABLET    Take 1 tablet (10 mg total) by mouth once daily.    SERTRALINE (ZOLOFT) 25 MG TABLET    Take 1 tablet (25 mg total) by mouth once daily.    SUMATRIPTAN (IMITREX) 50 MG TABLET    Take 1 tablet (50 mg total) by mouth every 4 to 6 hours as needed for Migraine.    ZINC GLUCONATE 50 MG TABLET    Take 50 mg by mouth once daily.     Objective:     Vitals:    11/09/23 0915   BP: (!) 141/73   Pulse: 81   Temp: 97.6 °F  (36.4 °C)     Lab Results   Component Value Date    WBC 6.16 11/09/2023    HGB 10.5 (L) 11/09/2023    HCT 33.4 (L) 11/09/2023    MCV 91 11/09/2023     11/09/2023       BMP  Lab Results   Component Value Date     11/09/2023    K 3.6 11/09/2023     11/09/2023    CO2 29 11/09/2023    BUN 13 11/09/2023    CREATININE 0.8 11/09/2023    CALCIUM 9.0 11/09/2023    ANIONGAP 8 11/09/2023    EGFRNORACEVR >60 11/09/2023     Lab Results   Component Value Date    ALT 28 11/09/2023    AST 19 11/09/2023    ALKPHOS 126 11/09/2023    BILITOT 0.4 11/09/2023     Physical Exam  Vitals reviewed.   Constitutional:       Appearance: She is well-developed.   HENT:      Head: Normocephalic.      Right Ear: External ear normal.      Left Ear: External ear normal.      Nose: Nose normal.   Eyes:      General: Lids are normal. No scleral icterus.        Right eye: No discharge.         Left eye: No discharge.      Conjunctiva/sclera: Conjunctivae normal.   Neck:      Thyroid: No thyroid mass.   Cardiovascular:      Rate and Rhythm: Normal rate and regular rhythm.      Heart sounds: Normal heart sounds.   Pulmonary:      Effort: Pulmonary effort is normal. No respiratory distress.      Breath sounds: Normal breath sounds. No wheezing or rales.   Abdominal:      General: Bowel sounds are normal. There is no distension.      Palpations: Abdomen is soft.      Tenderness: There is no abdominal tenderness.   Genitourinary:     Comments: deferred  Musculoskeletal:         General: Normal range of motion.      Cervical back: Normal range of motion.      Left lower leg: Edema present.   Lymphadenopathy:      Head:      Right side of head: No submandibular, preauricular or posterior auricular adenopathy.      Left side of head: No submandibular, preauricular or posterior auricular adenopathy.   Skin:     General: Skin is warm and dry.   Neurological:      Mental Status: She is alert and oriented to person, place, and time.    Psychiatric:         Speech: Speech normal.         Behavior: Behavior normal. Behavior is cooperative.         Thought Content: Thought content normal.          Assessment:     Problem List Items Addressed This Visit          Oncology    Peritoneal carcinomatosis     Continue current management          Ovarian cancer, bilateral     Labs reviewed stable without concerning cytopenias    Proceed with cycle 3 day 1 Carboplatin/Taxol in am as planned. F/u 3 weeks with cbc, cmp for consideration of cycle 4 chemo         Encounter for antineoplastic chemotherapy     Labs reviewed stable. Okay to proceed as planned            Endocrine    Morbid obesity     Encourage healthy nutrition along with portion control. Encourage daily exercise          Other Visit Diagnoses       Localized swelling of left lower leg    -  Primary    Relevant Orders    US LLE VENOUS (Completed)              Plan:     Localized swelling of left lower leg  -     US LLE VENOUS; Future; Expected date: 11/09/2023    Ovarian cancer, bilateral    Encounter for antineoplastic chemotherapy    Peritoneal carcinomatosis    Morbid obesity    Other orders  -     aprepitant (CINVANTI) injection 130 mg  -     palonosetron (ALOXI) 0.25 mg with dexamethasone (DECADRON) 20 mg in NS 50 mL IVPB  -     diphenhydrAMINE (BENADRYL) 50 mg in sodium chloride 0.9% 50 mL IVPB  -     famotidine (PF) injection 20 mg  -     PACLitaxeL (TAXOL) 175 mg/m2 = 408 mg in sodium chloride 0.9% 500 mL chemo infusion  -     CARBOplatin (PARAPLATIN) 730 mg in sodium chloride 0.9% 323 mL chemo infusion  -     sodium chloride 0.9% bolus 500 mL 500 mL  -     EPINEPHrine (EPIPEN) 0.3 mg/0.3 mL pen injection 0.3 mg  -     diphenhydrAMINE injection 50 mg  -     hydrocortisone sodium succinate injection 100 mg  -     prochlorperazine injection Soln 5 mg  -     sodium chloride 0.9% flush 10 mL  -     heparin, porcine (PF) 100 unit/mL injection flush 500 Units  -     alteplase injection 2  mg            Med Onc Chart Routing      Follow up with physician 3 weeks. Dr. Rangel   Follow up with [facility]    Infusion scheduling note   taxol/carboplatin   Injection scheduling note    Labs CBC, CMP and magnesium   Scheduling:  Preferred lab:  Lab interval:     Imaging None      Pharmacy appointment No pharmacy appointment needed      Other referrals       No additional referrals needed                 JAYDEN Sy

## 2023-11-09 NOTE — ASSESSMENT & PLAN NOTE
Labs reviewed stable without concerning cytopenias    Proceed with cycle 3 day 1 Carboplatin/Taxol in am as planned. F/u 3 weeks with cbc, cmp for consideration of cycle 4 chemo

## 2023-11-10 ENCOUNTER — INFUSION (OUTPATIENT)
Dept: INFUSION THERAPY | Facility: HOSPITAL | Age: 68
End: 2023-11-10
Attending: INTERNAL MEDICINE
Payer: MEDICARE

## 2023-11-10 VITALS
BODY MASS INDEX: 39.39 KG/M2 | HEIGHT: 67 IN | OXYGEN SATURATION: 96 % | SYSTOLIC BLOOD PRESSURE: 136 MMHG | HEART RATE: 86 BPM | WEIGHT: 251 LBS | TEMPERATURE: 97 F | DIASTOLIC BLOOD PRESSURE: 68 MMHG | RESPIRATION RATE: 16 BRPM

## 2023-11-10 DIAGNOSIS — C78.6 PERITONEAL CARCINOMATOSIS: ICD-10-CM

## 2023-11-10 DIAGNOSIS — C56.3 OVARIAN CANCER, BILATERAL: Primary | ICD-10-CM

## 2023-11-10 DIAGNOSIS — R53.1 WEAKNESS: ICD-10-CM

## 2023-11-10 PROBLEM — R55 VASOVAGAL REACTION: Status: ACTIVE | Noted: 2023-11-10

## 2023-11-10 LAB — GLUCOSE SERPL-MCNC: 160 MG/DL (ref 70–110)

## 2023-11-10 PROCEDURE — 96415 CHEMO IV INFUSION ADDL HR: CPT

## 2023-11-10 PROCEDURE — 63600175 PHARM REV CODE 636 W HCPCS: Performed by: NURSE PRACTITIONER

## 2023-11-10 PROCEDURE — 96417 CHEMO IV INFUS EACH ADDL SEQ: CPT

## 2023-11-10 PROCEDURE — 96367 TX/PROPH/DG ADDL SEQ IV INF: CPT

## 2023-11-10 PROCEDURE — 63600175 PHARM REV CODE 636 W HCPCS

## 2023-11-10 PROCEDURE — 96375 TX/PRO/DX INJ NEW DRUG ADDON: CPT

## 2023-11-10 PROCEDURE — 25000003 PHARM REV CODE 250: Performed by: NURSE PRACTITIONER

## 2023-11-10 PROCEDURE — 96413 CHEMO IV INFUSION 1 HR: CPT

## 2023-11-10 RX ORDER — ONDANSETRON 2 MG/ML
8 INJECTION INTRAMUSCULAR; INTRAVENOUS ONCE
Status: CANCELLED
Start: 2023-11-10 | End: 2023-11-10

## 2023-11-10 RX ORDER — ONDANSETRON 2 MG/ML
INJECTION INTRAMUSCULAR; INTRAVENOUS
Status: COMPLETED
Start: 2023-11-10 | End: 2023-11-10

## 2023-11-10 RX ORDER — ONDANSETRON 2 MG/ML
4 INJECTION INTRAMUSCULAR; INTRAVENOUS ONCE
Status: CANCELLED
Start: 2023-11-10 | End: 2023-11-10

## 2023-11-10 RX ORDER — ONDANSETRON 2 MG/ML
8 INJECTION INTRAMUSCULAR; INTRAVENOUS ONCE
Status: COMPLETED | OUTPATIENT
Start: 2023-11-10 | End: 2023-11-10

## 2023-11-10 RX ORDER — HEPARIN 100 UNIT/ML
500 SYRINGE INTRAVENOUS
Status: DISCONTINUED | OUTPATIENT
Start: 2023-11-10 | End: 2023-11-10 | Stop reason: HOSPADM

## 2023-11-10 RX ORDER — FAMOTIDINE 10 MG/ML
20 INJECTION INTRAVENOUS
Status: COMPLETED | OUTPATIENT
Start: 2023-11-10 | End: 2023-11-10

## 2023-11-10 RX ADMIN — DEXAMETHASONE SODIUM PHOSPHATE 0.25 MG: 4 INJECTION, SOLUTION INTRA-ARTICULAR; INTRALESIONAL; INTRAMUSCULAR; INTRAVENOUS; SOFT TISSUE at 11:11

## 2023-11-10 RX ADMIN — ONDANSETRON 8 MG: 2 INJECTION, SOLUTION INTRAMUSCULAR; INTRAVENOUS at 04:11

## 2023-11-10 RX ADMIN — PACLITAXEL 408 MG: 6 INJECTION, SOLUTION INTRAVENOUS at 12:11

## 2023-11-10 RX ADMIN — CARBOPLATIN 730 MG: 10 INJECTION, SOLUTION INTRAVENOUS at 03:11

## 2023-11-10 RX ADMIN — APREPITANT 130 MG: 130 INJECTION, EMULSION INTRAVENOUS at 11:11

## 2023-11-10 RX ADMIN — DIPHENHYDRAMINE HYDROCHLORIDE 50 MG: 50 INJECTION, SOLUTION INTRAMUSCULAR; INTRAVENOUS at 12:11

## 2023-11-10 RX ADMIN — FAMOTIDINE 20 MG: 10 INJECTION, SOLUTION INTRAVENOUS at 11:11

## 2023-11-10 RX ADMIN — ONDANSETRON 8 MG: 2 INJECTION INTRAMUSCULAR; INTRAVENOUS at 04:11

## 2023-11-10 NOTE — NURSING
1616 Patient was 3/4 way through her carboplatin with 111 ml left in bag, ambulated to the bathroom and when she returned she started to feel nauseous, diaphoretic and SOB. Elevated her legs, place cold compresses to head and neck. BP stable 132/68 P: 86. Oxygen saturation 83 on room air, place NC 3L, O2 only 88, placed nonrebreather on her and O2 sat increased to 98.   1620 Zofran order placed and given per IV  1629 NP Jennifer Pineda at chairside  1631 BP: 136/68 P:86 T:97.2 96 on Nonrebreather  1637 weaned O2 to 4L NC  1638 Blood sugar 160  Patient still very lethargic. NP gave order to send patient to ED for further observation.   1640 Spoke with charge nurse in ED, gave report.   Port remains accessed   Patient received 250 cc normal saline before being transferred to ED by wheelchair with two nurses at 1645, oxygen tank 4 L NC.  given all belongings including her cell phone and keys which were placed in her purse,  has purse.     Patient had similar reaction during the very end of infusion, pt was under the impression that the mg that was given during previous infusion made her have the reaction. No mg given this visit. Patient became hypotensive previously but BP remained stable through out current incident.

## 2023-11-10 NOTE — DISCHARGE INSTRUCTIONS
.[facility]  [address] [facility] [address]  [address] [address]  [phone] phone     [phone] fax  Hours of Operation: Monday- Friday 8:00am- 5:00pm  After hours phone  [phone]  Hematology / Oncology Physicians on call    Dr. Varinder Salinas           Nurse Practitioners:     Lynn Rouse, ASHLEIGH Pineda, GATITO Poon, ASHLEGIH Orellana, ASHLEIGH Perrin, NP    Please don't hesitate to call if you have any concerns.      FALL PREVENTION   Falls often occur due to slipping, tripping or losing your balance. Here are ways to reduce your risk of falling again.   Was there anything that caused your fall that can be fixed, removed or replaced?   Make your home safe by keeping walkways clear of objects you may trip over.   Use non-slip pads under rugs.   Do not walk in poorly lit areas.   Do not stand on chairs or wobbly ladders.   Use caution when reaching overhead or looking upward. This position can cause a loss of balance.   Be sure your shoes fit properly, have non-slip bottoms and are in good condition.   Be cautious when going up and down stairs, curbs, and when walking on uneven sidewalks.   If your balance is poor, consider using a cane or walker.   If your fall was related to alcohol use, stop or limit alcohol intake.   If your fall was related to use of sleeping medicines, talk to your doctor about this. You may need to reduce your dosage at bedtime if you awaken during the night to go to the bathroom.   To reduce the need for nighttime bathroom trips:   Avoid drinking fluids for several hours before going to bed   Empty your bladder before going to bed   Men can keep a urinal at the bedside   © 5678-4372 [employer], [address] [place] [address], [place] [address]. All rights reserved. This information is not intended as a substitute for professional medical care. Always follow your healthcare  professional's instructions.    WAYS TO HELP PREVENT INFECTION        WASH YOUR HANDS OFTEN DURING THE DAY, ESPECIALLY BEFORE YOU EAT, AFTER USING THE BATHROOM, AND AFTER TOUCHING ANIMALS    STAY AWAY FROM PEOPLE WHO HAVE ILLNESSES YOU CAN CATCH; SUCH AS COLDS, FLU, CHICKEN POX    TRY TO AVOID CROWDS    STAY AWAY FROM CHILDREN WHO RECENTLY HAVE RECEIVED LIVE VIRUS VACCINES    MAINTAIN GOOD MOUTH CARE    DO NOT SQUEEZE OR SCRATCH PIMPLES    CLEAN CUTS & SCRAPES RIGHT AWAY AND DAILY UNTIL HEALED WITH WARM WATER, SOAP & AN ANTISEPTIC    AVOID CONTACT WITH LITTER BOXES, BIRD CAGES, & FISH TANKS    AVOID STANDING WATER, IE., BIRD BATHS, FLOWER POTS/VASES, OR HUMIDIFIERS    WEAR GLOVES WHEN GARDENING OR CLEANING UP AFTER OTHERS, ESPECIALLY BABIES & SMALL CHILDREN    DO NOT EAT RAW FISH, SEAFOOD, MEAT, OR EGGS

## 2023-11-16 ENCOUNTER — PATIENT MESSAGE (OUTPATIENT)
Dept: GYNECOLOGIC ONCOLOGY | Facility: CLINIC | Age: 68
End: 2023-11-16
Payer: MEDICARE

## 2023-11-22 RX ORDER — SERTRALINE HYDROCHLORIDE 25 MG/1
25 TABLET, FILM COATED ORAL DAILY
Qty: 90 TABLET | Refills: 0 | Status: SHIPPED | OUTPATIENT
Start: 2023-11-22

## 2023-11-22 NOTE — TELEPHONE ENCOUNTER
Provider Staff:  Action required. This patient has received emergency care.     Please schedule patient for a follow up appointment within next 90 days.     Thanks!  [facility]     Appointments      Date Provider   Last Visit   6/12/2023 Rossana Ang MD   Next Visit   12/13/2023 Rossana Ang MD        Refill Decision Note   Casie Gaines  is requesting a refill authorization.  Brief Assessment and Rationale for Refill:  Approve     Medication Therapy Plan: ED documentation reviewed. No changes to therapy noted.        Extended chart review required: Yes   Comments:     Note composed:12:11 PM 11/22/2023

## 2023-11-22 NOTE — TELEPHONE ENCOUNTER
Refill Request Routed to the [facility] Review Pool for the pharmacist to review.

## 2023-11-22 NOTE — TELEPHONE ENCOUNTER
No care due was identified.  [facility] Embedded Care Due Messages. Reference number: [id number].   11/22/2023 5:08:52 AM CST

## 2023-11-29 ENCOUNTER — OFFICE VISIT (OUTPATIENT)
Dept: OPHTHALMOLOGY | Facility: CLINIC | Age: 68
End: 2023-11-29
Payer: MEDICARE

## 2023-11-29 DIAGNOSIS — Z83.511 FAMILY HISTORY OF GLAUCOMA IN MOTHER: ICD-10-CM

## 2023-11-29 DIAGNOSIS — H40.013 AT LOW RISK FOR OPEN-ANGLE GLAUCOMA IN BOTH EYES: Primary | ICD-10-CM

## 2023-11-29 DIAGNOSIS — Z96.1 PSEUDOPHAKIA OF BOTH EYES: ICD-10-CM

## 2023-11-29 DIAGNOSIS — H52.7 REFRACTIVE ERRORS: ICD-10-CM

## 2023-11-29 PROCEDURE — 99999 PR PBB SHADOW E&M-EST. PATIENT-LVL III: ICD-10-PCS | Mod: PBBFAC,,, | Performed by: OPTOMETRIST

## 2023-11-29 PROCEDURE — 92015 PR REFRACTION: ICD-10-PCS | Mod: S$GLB,,, | Performed by: OPTOMETRIST

## 2023-11-29 PROCEDURE — 92083 HUMPHREY VISUAL FIELD - OU - BOTH EYES: ICD-10-PCS | Mod: S$GLB,,, | Performed by: OPTOMETRIST

## 2023-11-29 PROCEDURE — 92014 COMPRE OPH EXAM EST PT 1/>: CPT | Mod: S$GLB,,, | Performed by: OPTOMETRIST

## 2023-11-29 PROCEDURE — 1159F PR MEDICATION LIST DOCUMENTED IN MEDICAL RECORD: ICD-10-PCS | Mod: CPTII,S$GLB,, | Performed by: OPTOMETRIST

## 2023-11-29 PROCEDURE — 1159F MED LIST DOCD IN RCRD: CPT | Mod: CPTII,S$GLB,, | Performed by: OPTOMETRIST

## 2023-11-29 PROCEDURE — 3044F HG A1C LEVEL LT 7.0%: CPT | Mod: CPTII,S$GLB,, | Performed by: OPTOMETRIST

## 2023-11-29 PROCEDURE — 92083 EXTENDED VISUAL FIELD XM: CPT | Mod: S$GLB,,, | Performed by: OPTOMETRIST

## 2023-11-29 PROCEDURE — 92133 CPTRZD OPH DX IMG PST SGM ON: CPT | Mod: S$GLB,,, | Performed by: OPTOMETRIST

## 2023-11-29 PROCEDURE — 92133 POSTERIOR SEGMENT OCT OPTIC NERVE(OCULAR COHERENCE TOMOGRAPHY) - OU - BOTH EYES: ICD-10-PCS | Mod: S$GLB,,, | Performed by: OPTOMETRIST

## 2023-11-29 PROCEDURE — 92014 PR EYE EXAM, EST PATIENT,COMPREHESV: ICD-10-PCS | Mod: S$GLB,,, | Performed by: OPTOMETRIST

## 2023-11-29 PROCEDURE — 99999 PR PBB SHADOW E&M-EST. PATIENT-LVL III: CPT | Mod: PBBFAC,,, | Performed by: OPTOMETRIST

## 2023-11-29 PROCEDURE — 3044F PR MOST RECENT HEMOGLOBIN A1C LEVEL <7.0%: ICD-10-PCS | Mod: CPTII,S$GLB,, | Performed by: OPTOMETRIST

## 2023-11-29 PROCEDURE — 92015 DETERMINE REFRACTIVE STATE: CPT | Mod: S$GLB,,, | Performed by: OPTOMETRIST

## 2023-11-29 NOTE — PROGRESS NOTES
HEMATOLOGY / ONCOLOGY   CLINIC NOTE       Established Patient - Telehealth Visit     The patient location is: clinic  The chief complaint leading to consultation is: follow up / chemo  Visit type: Virtual visit with synchronous audio and video    Face to Face time with patient: 40 minutes of total time spent on the encounter, which includes face to face time and non-face to face time preparing to see the patient (eg, review of tests), Obtaining and/or reviewing separately obtained history, Documenting clinical information in the electronic or other health record, Independently interpreting results (not separately reported) and communicating results to the patient/family/caregiver, or Care coordination (not separately reported).        The reason for the audio only service rather than synchronous audio and video virtual visit was related to technical difficulties or patient preference/necessity.     Each patient to whom I provide medical services by telemedicine is:  (1) informed of the relationship between the physician and patient and the respective role of any other health care provider with respect to management of the patient; and (2) notified that they may decline to receive medical services by telemedicine and may withdraw from such care at any time. Patient verbally consented to receive this service via voice-only telephone call.             This service was not originating from a related E/M service provided within the previous 7 days nor will  to an E/M service or procedure within the next 24 hours or my soonest available appointment.  Prevailing standard of care was able to be met in this audio-only visit.    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      ZAYRA Frias Givens  68 y.o.  female with past medical history significant for hypertension, CHF here for follow up of ovarian cancer    Pt was diagnosed with peritoneal carcinomatosis and malignant right pleural effusion consistent with ovarian primary with CA-125 of 2740. PET scan demonstrated peritoneal carcinomatosis & extensive lymphadenopathy.  She was tried treated with neoadjuvant chemotherapy with carboplatin/Taxol/Avastin and had a right ureter stent placed due to postrenal obstruction from tumor. On 8/15/2019 underwent salpingoophorectomy. Pathology showed CR.  Patient was then treated with maintenance Avastin and later on discontinued and September of 2020.    She was noted on surveillance CT scan to have disease recurrence on 08/18/2023 and started on carboplatin and paclitaxel as platinum sensitive disease by gyn Oncology.     Interval History:     Patient here for 4th cycle of carboplatin and paclitaxel.     After 2nd cycle of chemotherapy patient had nausea, shortness of breath, sweating and hypotension.  She was treated with IV fluids Compazine and steroids with improvement in symptoms.     After 3rd cycle she was noted to have nausea, sweating, shortness for breath and hypoxic on room air.  She was transferred to the ER and manage symptomatically    Denied any rashes, swelling, vomiting / diarrhea, abdominal pain, cough, choking, wheezing.        Oncology History   Peritoneal carcinomatosis   1/26/2019 Initial Diagnosis    Peritoneal carcinomatosis     2/15/2019 - 7/8/2019 Chemotherapy    Treatment Summary   Plan Name: OP GYN PACLITAXEL CARBOPLATIN (AUC 6) Q3W  Treatment Goal: Palliative  Status: Inactive  Start Date: 2/28/2019  End  Date: 6/18/2019  Provider: Will Trevizo Jr., MD  Chemotherapy: bevacizumab (AVASTIN) 15 mg/kg = 1,600 mg in sodium chloride 0.9% 100 mL chemo infusion, 15 mg/kg = 1,600 mg (100 % of original dose 15 mg/kg), Intravenous, Clinic/[facility] 1 time, 6 of 6 cycles  Dose modification: 15 mg/kg (original dose 15 mg/kg, Cycle 1)  Administration: 1,600 mg (2/28/2019), 1,600 mg (3/21/2019), 1,600 mg (4/11/2019), 1,600 mg (5/7/2019), 1,600 mg (5/28/2019), 1,510 mg (6/18/2019)  CARBOplatin (PARAPLATIN) 870 mg in sodium chloride 0.9% 337 mL chemo infusion, 870 mg (100 % of original dose 868.8 mg), Intravenous, Clinic/[facility] 1 time, 6 of 6 cycles  Dose modification:   (original dose 868.8 mg, Cycle 1)  Administration: 870 mg (2/28/2019), 870 mg (3/21/2019), 900 mg (4/11/2019), 870 mg (5/7/2019), 660 mg (5/28/2019), 690 mg (6/18/2019)  PACLitaxel (TAXOL) 175 mg/m2 = 396 mg in sodium chloride 0.9% 566 mL chemo infusion, 175 mg/m2 = 396 mg, Intravenous, Clinic/[facility] 1 time, 6 of 6 cycles  Dose modification: 140 mg/m2 (80 % of original dose 175 mg/m2, Cycle 5)  Administration: 396 mg (2/28/2019), 396 mg (3/21/2019), 396 mg (4/11/2019), 396 mg (5/7/2019), 318 mg (5/28/2019), 306 mg (6/18/2019)     10/9/2019 - 9/24/2020 Chemotherapy    Treatment Summary   Plan Name: OP BEVACIZUMAB Q3W   Treatment Goal: Maintenance  Status: Inactive  Start Date: 10/9/2019  End Date: 9/24/2020  Provider: Oscar Mckeon MD  Chemotherapy: dexAMETHasone tablet 8 mg, 8 mg (100 % of original dose 8 mg), Oral, Clinic/[facility] 1 time, 2 of 8 cycles  Dose modification: 8 mg (original dose 8 mg, Cycle 8), 8 mg (original dose 8 mg, Cycle 12)  Administration: 8 mg (7/22/2020), 8 mg (8/13/2020)  bevacizumab (AVASTIN) 15 mg/kg = 1,615 mg in sodium chloride 0.9% 100 mL chemo infusion, 15 mg/kg = 1,615 mg, Intravenous, Clinic/[facility] 1 time, 11 of 17 cycles  Administration: 1,615 mg (10/9/2019), 1,615 mg (10/29/2019), 1,615 mg (12/10/2019), 1,615 mg (1/21/2020), 1,600 mg (4/2/2020),  1,615 mg (4/23/2020), 1,600 mg (7/1/2020), 1,600 mg (7/22/2020), 1,600 mg (8/13/2020), 1,795 mg (9/3/2020), 1,795 mg (9/24/2020)     9/29/2023 -  Chemotherapy    Treatment Summary   Plan Name: OP GYN PACLITAXEL CARBOPLATIN (AUC 6) Q3W  Treatment Goal: Control  Status: Active  Start Date: 9/29/2023  End Date: 8/30/2024 (Planned)  Provider: Ismael Juarez MD  Chemotherapy: CARBOplatin (PARAPLATIN) 650 mg in sodium chloride 0.9% 335 mL chemo infusion, 650 mg (100 % of original dose 648 mg), Intravenous, Clinic/[facility] 1 time, 3 of 17 cycles  Dose modification:   (original dose 648 mg, Cycle 1),   (original dose 853.8 mg, Cycle 2),   (original dose 730 mg, Cycle 3),   (original dose 730 mg, Cycle 17)  Administration: 650 mg (9/29/2023), 730 mg (11/10/2023), 710 mg (10/20/2023)  PACLitaxeL (TAXOL) 175 mg/m2 = 402 mg in sodium chloride 0.9% 500 mL chemo infusion, 175 mg/m2 = 402 mg, Intravenous, Clinic/[facility] 1 time, 3 of 17 cycles  Administration: 402 mg (9/29/2023), 402 mg (10/20/2023), 408 mg (11/10/2023)     Malignant neoplasm of right ovary (Resolved)   2/15/2019 Initial Diagnosis    Malignant neoplasm of right ovary     2/15/2019 - 7/8/2019 Chemotherapy    Treatment Summary   Plan Name: OP GYN PACLITAXEL CARBOPLATIN (AUC 6) Q3W  Treatment Goal: Palliative  Status: Inactive  Start Date: 2/28/2019  End Date: 6/18/2019  Provider: Will Trevizo Jr., MD  Chemotherapy: bevacizumab (AVASTIN) 15 mg/kg = 1,600 mg in sodium chloride 0.9% 100 mL chemo infusion, 15 mg/kg = 1,600 mg (100 % of original dose 15 mg/kg), Intravenous, Clinic/[facility] 1 time, 6 of 6 cycles  Dose modification: 15 mg/kg (original dose 15 mg/kg, Cycle 1)  Administration: 1,600 mg (2/28/2019), 1,600 mg (3/21/2019), 1,600 mg (4/11/2019), 1,600 mg (5/7/2019), 1,600 mg (5/28/2019), 1,510 mg (6/18/2019)  CARBOplatin (PARAPLATIN) 870 mg in sodium chloride 0.9% 337 mL chemo infusion, 870 mg (100 % of original dose 868.8 mg), Intravenous, Clinic/[facility] 1 time, 6 of 6  cycles  Dose modification:   (original dose 868.8 mg, Cycle 1)  Administration: 870 mg (2/28/2019), 870 mg (3/21/2019), 900 mg (4/11/2019), 870 mg (5/7/2019), 660 mg (5/28/2019), 690 mg (6/18/2019)  PACLitaxel (TAXOL) 175 mg/m2 = 396 mg in sodium chloride 0.9% 566 mL chemo infusion, 175 mg/m2 = 396 mg, Intravenous, Clinic/[facility] 1 time, 6 of 6 cycles  Dose modification: 140 mg/m2 (80 % of original dose 175 mg/m2, Cycle 5)  Administration: 396 mg (2/28/2019), 396 mg (3/21/2019), 396 mg (4/11/2019), 396 mg (5/7/2019), 318 mg (5/28/2019), 306 mg (6/18/2019)     10/9/2019 - 9/24/2020 Chemotherapy    Treatment Summary   Plan Name: OP BEVACIZUMAB Q3W   Treatment Goal: Maintenance  Status: Inactive  Start Date: 10/9/2019  End Date: 9/24/2020  Provider: Oscar Mckeon MD  Chemotherapy: dexAMETHasone tablet 8 mg, 8 mg (100 % of original dose 8 mg), Oral, Clinic/[facility] 1 time, 2 of 8 cycles  Dose modification: 8 mg (original dose 8 mg, Cycle 8), 8 mg (original dose 8 mg, Cycle 12)  Administration: 8 mg (7/22/2020), 8 mg (8/13/2020)  bevacizumab (AVASTIN) 15 mg/kg = 1,615 mg in sodium chloride 0.9% 100 mL chemo infusion, 15 mg/kg = 1,615 mg, Intravenous, Clinic/[facility] 1 time, 11 of 17 cycles  Administration: 1,615 mg (10/9/2019), 1,615 mg (10/29/2019), 1,615 mg (12/10/2019), 1,615 mg (1/21/2020), 1,600 mg (4/2/2020), 1,615 mg (4/23/2020), 1,600 mg (7/1/2020), 1,600 mg (7/22/2020), 1,600 mg (8/13/2020), 1,795 mg (9/3/2020), 1,795 mg (9/24/2020)     Malignant neoplasm of both ovaries (Resolved)   2/18/2019 Initial Diagnosis    Ovarian cancer     10/9/2019 - 9/24/2020 Chemotherapy    Treatment Summary   Plan Name: OP BEVACIZUMAB Q3W   Treatment Goal: Maintenance  Status: Inactive  Start Date: 10/9/2019  End Date: 9/24/2020  Provider: Oscar Mckeon MD  Chemotherapy: dexAMETHasone tablet 8 mg, 8 mg (100 % of original dose 8 mg), Oral, Clinic/[facility] 1 time, 2 of 8 cycles  Dose modification: 8 mg (original dose 8 mg, Cycle 8), 8 mg (original dose  8 mg, Cycle 12)  Administration: 8 mg (7/22/2020), 8 mg (8/13/2020)  bevacizumab (AVASTIN) 15 mg/kg = 1,615 mg in sodium chloride 0.9% 100 mL chemo infusion, 15 mg/kg = 1,615 mg, Intravenous, Clinic/[facility] 1 time, 11 of 17 cycles  Administration: 1,615 mg (10/9/2019), 1,615 mg (10/29/2019), 1,615 mg (12/10/2019), 1,615 mg (1/21/2020), 1,600 mg (4/2/2020), 1,615 mg (4/23/2020), 1,600 mg (7/1/2020), 1,600 mg (7/22/2020), 1,600 mg (8/13/2020), 1,795 mg (9/3/2020), 1,795 mg (9/24/2020)     Ovarian cancer, bilateral   8/15/2019 Initial Diagnosis    Ovarian cancer, bilateral     10/9/2019 - 9/24/2020 Chemotherapy    Treatment Summary   Plan Name: OP BEVACIZUMAB Q3W   Treatment Goal: Maintenance  Status: Inactive  Start Date: 10/9/2019  End Date: 9/24/2020  Provider: Oscar Mckeon MD  Chemotherapy: dexAMETHasone tablet 8 mg, 8 mg (100 % of original dose 8 mg), Oral, Clinic/[facility] 1 time, 2 of 8 cycles  Dose modification: 8 mg (original dose 8 mg, Cycle 8), 8 mg (original dose 8 mg, Cycle 12)  Administration: 8 mg (7/22/2020), 8 mg (8/13/2020)  bevacizumab (AVASTIN) 15 mg/kg = 1,615 mg in sodium chloride 0.9% 100 mL chemo infusion, 15 mg/kg = 1,615 mg, Intravenous, Clinic/[facility]OD 1 time, 11 of 17 cycles  Administration: 1,615 mg (10/9/2019), 1,615 mg (10/29/2019), 1,615 mg (12/10/2019), 1,615 mg (1/21/2020), 1,600 mg (4/2/2020), 1,615 mg (4/23/2020), 1,600 mg (7/1/2020), 1,600 mg (7/22/2020), 1,600 mg (8/13/2020), 1,795 mg (9/3/2020), 1,795 mg (9/24/2020)     9/29/2023 -  Chemotherapy    Treatment Summary   Plan Name: OP GYN PACLITAXEL CARBOPLATIN (AUC 6) Q3W  Treatment Goal: Control  Status: Active  Start Date: 9/29/2023  End Date: 8/30/2024 (Planned)  Provider: Ismael Juarez MD  Chemotherapy: CARBOplatin (PARAPLATIN) 650 mg in sodium chloride 0.9% 335 mL chemo infusion, 650 mg (100 % of original dose 648 mg), Intravenous, Clinic/[facility] 1 time, 3 of 17 cycles  Dose modification:   (original dose 648 mg, Cycle 1),   (original dose  853.8 mg, Cycle 2),   (original dose 730 mg, Cycle 3),   (original dose 730 mg, Cycle 17)  Administration: 650 mg (2023), 730 mg (11/10/2023), 710 mg (10/20/2023)  PACLitaxeL (TAXOL) 175 mg/m2 = 402 mg in sodium chloride 0.9% 500 mL chemo infusion, 175 mg/m2 = 402 mg, Intravenous, Clinic/[facility] 1 time, 3 of 17 cycles  Administration: 402 mg (2023), 402 mg (10/20/2023), 408 mg (11/10/2023)         Past Medical History:   Diagnosis Date    Anemia     Anxiety     Arthritis     knees    Cancer     ovarian    CHF (congestive heart failure)     Hx antineoplastic chemotherapy     last 2019    Hyperlipemia     Hypertension     Neck pain     Ovarian cancer     CHEMO    Peritoneal carcinomatosis 2019      Past Surgical History:   Procedure Laterality Date    breast reduction  10/02/2018    CATARACT EXTRACTION Bilateral     2023     SECTION      X 1    COLONOSCOPY N/A 2021    Procedure: COLONOSCOPY;  Surgeon: Paulette Rojas MD;  Location: [facility];  Service: Gastroenterology;  Laterality: N/A;    CYSTOSCOPY W/ URETERAL STENT PLACEMENT Right 2019    Procedure: CYSTOSCOPY, WITH URETERAL STENT INSERTION;  Surgeon: Timmy Santiago IV, MD;  Location: [facility] OR;  Service: Urology;  Laterality: Right;    CYSTOSCOPY W/ URETERAL STENT REMOVAL  10/04/2019    DILATION AND CURETTAGE OF UTERUS      HYSTERECTOMY      RALH for fibroids (still has ovaries)    INSERTION OF VENOUS ACCESS PORT Left 2019    Procedure: INSERTION, VENOUS ACCESS PORT;  Surgeon: Ulisses Monzon MD;  Location: [facility] OR;  Service: General;  Laterality: Left;  Left internal jugular     LYSIS OF ADHESIONS OF URETER N/A 08/15/2019    Procedure: URETEROLYSIS;  Surgeon: Ismael Juarez MD;  Location: [facility] OR;  Service: OB/GYN;  Laterality: N/A;    OMENTECTOMY N/A 08/15/2019    Procedure: OMENTECTOMY;  Surgeon: Ismael Juarez MD;  Location: [facility] OR;  Service: OB/GYN;  Laterality: N/A;    RETROGRADE PYELOGRAPHY Right  2019    Procedure: PYELOGRAM, RETROGRADE;  Surgeon: Timmy Santiago IV, MD;  Location: [facility] OR;  Service: Urology;  Laterality: Right;    ROBOT-ASSISTED LAPAROSCOPIC SALPINGO-OOPHORECTOMY USING DA TIA XI Bilateral 08/15/2019    Procedure: XI ROBOTIC SALPINGO-OOPHORECTOMY;  Surgeon: Ismael Juarez MD;  Location: [facility] OR;  Service: OB/GYN;  Laterality: Bilateral;    TOTAL REDUCTION MAMMOPLASTY  2018    TUBAL LIGATION       Social History     Socioeconomic History    Marital status:    Tobacco Use    Smoking status: Former     Current packs/day: 0.00     Average packs/day: 1 pack/day for 25.0 years (25.0 ttl pk-yrs)     Types: Cigarettes     Start date: 10/1/1993     Quit date: 10/1/2018     Years since quittin.1    Smokeless tobacco: Never    Tobacco comments:     States started quit 2 months ago after 30 years   Substance and Sexual Activity    Alcohol use: Yes     Comment: occasionally  No alcohol 72h prior to sx    Drug use: No    Sexual activity: Not Currently     Partners: Male     Comment: hyst; mut monog   Social History Narrative    Live w/ spouse and 2 dogs      Social Determinants of Health     Stress: No Stress Concern Present (2019)    [employer] - Occupational Stress Questionnaire     Feeling of Stress : Only a little      Family History   Problem Relation Age of Onset    Glaucoma Mother     Colon cancer Brother     Breast cancer Maternal Aunt     Breast cancer Paternal Aunt     Ovarian cancer Paternal Aunt     Anesthesia problems Other     Thrombophilia Neg Hx           Review of patient's allergies indicates:  No Known Allergies     Review of Systems   Constitutional:  Positive for activity change and fatigue. Negative for chills and fever.   HENT: Negative.     Eyes: Negative.    Respiratory:  Negative for cough and shortness of breath.    Cardiovascular:  Negative for chest pain and leg swelling.   Gastrointestinal:  Positive for abdominal pain  and nausea. Negative for constipation, diarrhea and vomiting.   Endocrine: Negative.    Genitourinary: Negative.    Musculoskeletal:  Negative for arthralgias and myalgias.   Integumentary:  Negative.   Allergic/Immunologic: Negative.    Neurological:  Negative for light-headedness, numbness and headaches.   Hematological: Negative.    Psychiatric/Behavioral: Negative.           Objective:        There were no vitals filed for this visit.           Physical Exam  Constitutional:       Appearance: Normal appearance.   HENT:      Head: Atraumatic.   Eyes:      Extraocular Movements: Extraocular movements intact.   Pulmonary:      Effort: Pulmonary effort is normal. No respiratory distress.   Neurological:      Mental Status: She is alert and oriented to person, place, and time.   Psychiatric:         Mood and Affect: Mood normal.         Behavior: Behavior normal.           LABS / IMAGING      - 08/21/2023 CT CAP: Marked interval disease progression with small right pleural effusion and severe peritoneal carcinomatosis. There is a large left lower quadrant parietal peritoneal implant measuring 3.4 x 1.9 cm. There are even nodular implants seen within the ventral abdominal wall small hernias. The left lower quadrant metastatic implant measures 4.0 x 5.6 x 4.7 cm (prior 1.3 x 1.0 x 1.2 cm). New left retroperitoneal nodule at the level of the aortic bifurcation measuring 1.7 x 1.5 cm.          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  Patient was then treated with Avastin maintenance  - 08/21/2023 CT CAP: Marked interval disease progression with severe peritoneal carcinomatosis as detailed above.  - : 7 >> 21 >> 32 >> 64 (9/2023)      Plan:     - Patient with recurrent platinum sensitive stage IV  ovarian cancer restarted on carboplatin and paclitaxel on 09/29/2023, completed 3 treatments but during the 2nd and 3rd treatment has reactions including shortness of breath, sweating and was noted to have hypoxia and hypotension.  Discussed the findings with the patient and suspicion for infusion reaction with recommendation to have urgent evaluation by allergy.  Patient is scheduled for an appointment tomorrow.  Also discussed with pharmacy for a plan for desensitization if agreed by the allergy doctors.  In the meantime requested repeat CT scan to reassess the disease and will discuss with the primary gyn Oncology for the recommendations   - prescribed potassium and magnesium for hypomagnesemia and hypokalemia  - MD/LABS/TREATMENT - 2 WEEKS         Med Onc Chart Routing      Follow up with physician    Follow up with [facility]    Infusion scheduling note   Hold chemotherapy today, plan to have restarted on chemotherapy with desensitization protocol in 2 weeks   Injection scheduling note    Labs CBC, CMP and magnesium   Scheduling:  Preferred lab:   Lab interval:  Labs before next visit   Imaging CT chest abdomen pelvis   Urgent   Pharmacy appointment    Other referrals         Allergy appointment               The patient was seen, interviewed and examined. Pertinent lab and radiology studies were reviewed. Pt instructed to call should develop concerning signs/symptoms or have further questions.       Portions of the record may have been created with voice recognition software. Occasional wrong-word or sound-a-like substitutions may have occurred due to the inherent limitations of voice recognition software. Read the chart carefully and recognize, using context, where substitutions have occurred.    Orlando Rangel MD  Hematology / Oncology

## 2023-11-29 NOTE — PROGRESS NOTES
HPI    Last visit with TRF on 05/31/2023    Patient states no visual complaints  Trouble with dry eyes   Using Visine prn   Wear otc readers +1.75  Last edited by Kary Gonzalez on 11/29/2023  8:51 AM.            Assessment /Plan     For exam results, see Encounter Report.    At low risk for open-angle glaucoma in both eyes  -     Siddiqi Visual Field - OU - Extended - Both Eyes  -     Posterior Segment OCT Optic Nerve- Both eyes    Family history of glaucoma in mother    Pseudophakia of both eyes    Refractive errors      Stable IOP OU without drops.  Normal VF and gOCT/GCL OU     Stable IOL OU.    Dispense Final Rx for glasses or may use OTC glasses.  RTC 6 months IOP ck  Discussed above and answered questions.

## 2023-11-30 ENCOUNTER — LAB VISIT (OUTPATIENT)
Dept: LAB | Facility: HOSPITAL | Age: 68
End: 2023-11-30
Attending: INTERNAL MEDICINE
Payer: MEDICARE

## 2023-11-30 ENCOUNTER — OFFICE VISIT (OUTPATIENT)
Dept: HEMATOLOGY/ONCOLOGY | Facility: CLINIC | Age: 68
End: 2023-11-30
Payer: MEDICARE

## 2023-11-30 DIAGNOSIS — E87.6 HYPOKALEMIA: ICD-10-CM

## 2023-11-30 DIAGNOSIS — Z90.722 S/P BSO (BILATERAL SALPINGO-OOPHORECTOMY): ICD-10-CM

## 2023-11-30 DIAGNOSIS — E83.42 HYPOMAGNESEMIA: ICD-10-CM

## 2023-11-30 DIAGNOSIS — C78.6 PERITONEAL CARCINOMATOSIS: ICD-10-CM

## 2023-11-30 DIAGNOSIS — C56.3 OVARIAN CANCER, BILATERAL: Primary | ICD-10-CM

## 2023-11-30 DIAGNOSIS — C56.3 OVARIAN CANCER, BILATERAL: ICD-10-CM

## 2023-11-30 DIAGNOSIS — I10 HYPERTENSION, UNSPECIFIED TYPE: ICD-10-CM

## 2023-11-30 DIAGNOSIS — C56.9 MALIGNANT NEOPLASM OF OVARY, UNSPECIFIED LATERALITY: ICD-10-CM

## 2023-11-30 LAB
ALBUMIN SERPL BCP-MCNC: 3.5 G/DL (ref 3.5–5.2)
ALP SERPL-CCNC: 120 U/L (ref 55–135)
ALT SERPL W/O P-5'-P-CCNC: 20 U/L (ref 10–44)
ANION GAP SERPL CALC-SCNC: 8 MMOL/L (ref 8–16)
AST SERPL-CCNC: 19 U/L (ref 10–40)
BASOPHILS # BLD AUTO: 0.05 K/UL (ref 0–0.2)
BASOPHILS NFR BLD: 0.7 % (ref 0–1.9)
BILIRUB SERPL-MCNC: 0.3 MG/DL (ref 0.1–1)
BUN SERPL-MCNC: 15 MG/DL (ref 8–23)
CALCIUM SERPL-MCNC: 9.4 MG/DL (ref 8.7–10.5)
CHLORIDE SERPL-SCNC: 110 MMOL/L (ref 95–110)
CO2 SERPL-SCNC: 28 MMOL/L (ref 23–29)
CREAT SERPL-MCNC: 0.8 MG/DL (ref 0.5–1.4)
DIFFERENTIAL METHOD: ABNORMAL
EOSINOPHIL # BLD AUTO: 0.1 K/UL (ref 0–0.5)
EOSINOPHIL NFR BLD: 1.1 % (ref 0–8)
ERYTHROCYTE [DISTWIDTH] IN BLOOD BY AUTOMATED COUNT: 17.2 % (ref 11.5–14.5)
EST. GFR  (NO RACE VARIABLE): >60 ML/MIN/1.73 M^2
GLUCOSE SERPL-MCNC: 111 MG/DL (ref 70–110)
HCT VFR BLD AUTO: 31.4 % (ref 37–48.5)
HGB BLD-MCNC: 10.1 G/DL (ref 12–16)
IMM GRANULOCYTES # BLD AUTO: 0.05 K/UL (ref 0–0.04)
IMM GRANULOCYTES NFR BLD AUTO: 0.7 % (ref 0–0.5)
LYMPHOCYTES # BLD AUTO: 2.2 K/UL (ref 1–4.8)
LYMPHOCYTES NFR BLD: 30.9 % (ref 18–48)
MAGNESIUM SERPL-MCNC: 1.5 MG/DL (ref 1.6–2.6)
MCH RBC QN AUTO: 29.2 PG (ref 27–31)
MCHC RBC AUTO-ENTMCNC: 32.2 G/DL (ref 32–36)
MCV RBC AUTO: 91 FL (ref 82–98)
MONOCYTES # BLD AUTO: 0.5 K/UL (ref 0.3–1)
MONOCYTES NFR BLD: 6.5 % (ref 4–15)
NEUTROPHILS # BLD AUTO: 4.3 K/UL (ref 1.8–7.7)
NEUTROPHILS NFR BLD: 60.1 % (ref 38–73)
NRBC BLD-RTO: 0 /100 WBC
PLATELET # BLD AUTO: 276 K/UL (ref 150–450)
PMV BLD AUTO: 9.1 FL (ref 9.2–12.9)
POTASSIUM SERPL-SCNC: 3.2 MMOL/L (ref 3.5–5.1)
PROT SERPL-MCNC: 6.6 G/DL (ref 6–8.4)
RBC # BLD AUTO: 3.46 M/UL (ref 4–5.4)
SODIUM SERPL-SCNC: 146 MMOL/L (ref 136–145)
WBC # BLD AUTO: 7.09 K/UL (ref 3.9–12.7)

## 2023-11-30 PROCEDURE — 83735 ASSAY OF MAGNESIUM: CPT | Performed by: NURSE PRACTITIONER

## 2023-11-30 PROCEDURE — 3044F PR MOST RECENT HEMOGLOBIN A1C LEVEL <7.0%: ICD-10-PCS | Mod: CPTII,95,, | Performed by: INTERNAL MEDICINE

## 2023-11-30 PROCEDURE — 99214 PR OFFICE/OUTPT VISIT, EST, LEVL IV, 30-39 MIN: ICD-10-PCS | Mod: 95,,, | Performed by: INTERNAL MEDICINE

## 2023-11-30 PROCEDURE — 80053 COMPREHEN METABOLIC PANEL: CPT | Performed by: NURSE PRACTITIONER

## 2023-11-30 PROCEDURE — 3044F HG A1C LEVEL LT 7.0%: CPT | Mod: CPTII,95,, | Performed by: INTERNAL MEDICINE

## 2023-11-30 PROCEDURE — 36415 COLL VENOUS BLD VENIPUNCTURE: CPT | Performed by: NURSE PRACTITIONER

## 2023-11-30 PROCEDURE — 99214 OFFICE O/P EST MOD 30 MIN: CPT | Mod: 95,,, | Performed by: INTERNAL MEDICINE

## 2023-11-30 PROCEDURE — 85025 COMPLETE CBC W/AUTO DIFF WBC: CPT | Performed by: NURSE PRACTITIONER

## 2023-11-30 RX ORDER — LANOLIN ALCOHOL/MO/W.PET/CERES
400 CREAM (GRAM) TOPICAL DAILY
Qty: 10 TABLET | Refills: 0 | Status: SHIPPED | OUTPATIENT
Start: 2023-11-30 | End: 2023-12-13

## 2023-11-30 RX ORDER — ROSUVASTATIN CALCIUM 10 MG/1
10 TABLET, COATED ORAL DAILY
Qty: 90 TABLET | Refills: 1 | Status: SHIPPED | OUTPATIENT
Start: 2023-11-30

## 2023-11-30 RX ORDER — POTASSIUM CHLORIDE 20 MEQ/1
20 TABLET, EXTENDED RELEASE ORAL DAILY
Qty: 14 TABLET | Refills: 0 | Status: SHIPPED | OUTPATIENT
Start: 2023-11-30 | End: 2023-12-13

## 2023-11-30 NOTE — TELEPHONE ENCOUNTER
No care due was identified.  [facility] Embedded Care Due Messages. Reference number: [id number].   11/30/2023 10:36:26 AM CST

## 2023-11-30 NOTE — TELEPHONE ENCOUNTER
----- Message from Yandel Davis sent at 11/30/2023 10:37 AM CST -----  Contact: Pt  Type:  RX Refill Request    Who Called:  pt   Refill or New Rx: refill   RX Name and Strength: rosuvastatin (CRESTOR) 10 MG tablet  How is the patient currently taking it? (ex. 1XDay): once daily   Is this a 30 day or 90 day RX: 90 days   Preferred Pharmacy with phone number: [facility]   Local or Mail Order: local   Ordering Provider: zaheer   Would the patient rather a call back or a response via [facility]? phone  Best Call Back Number:[phone]  Additional Information:  pt is out of the medication       [facility] [address] [place] [address] [place] [id number]  Phone: [phone] Fax: [phone]

## 2023-12-01 ENCOUNTER — OFFICE VISIT (OUTPATIENT)
Dept: ALLERGY | Facility: CLINIC | Age: 68
End: 2023-12-01
Payer: MEDICARE

## 2023-12-01 VITALS
DIASTOLIC BLOOD PRESSURE: 71 MMHG | WEIGHT: 250 LBS | TEMPERATURE: 97 F | HEIGHT: 67 IN | SYSTOLIC BLOOD PRESSURE: 119 MMHG | HEART RATE: 79 BPM | BODY MASS INDEX: 39.24 KG/M2

## 2023-12-01 DIAGNOSIS — C56.3 OVARIAN CANCER, BILATERAL: ICD-10-CM

## 2023-12-01 DIAGNOSIS — T80.90XA INFUSION REACTION, INITIAL ENCOUNTER: ICD-10-CM

## 2023-12-01 DIAGNOSIS — T50.905A ADVERSE EFFECT OF DRUG, INITIAL ENCOUNTER: Primary | ICD-10-CM

## 2023-12-01 PROCEDURE — 99999 PR PBB SHADOW E&M-EST. PATIENT-LVL III: CPT | Mod: PBBFAC,,, | Performed by: STUDENT IN AN ORGANIZED HEALTH CARE EDUCATION/TRAINING PROGRAM

## 2023-12-01 PROCEDURE — 1159F PR MEDICATION LIST DOCUMENTED IN MEDICAL RECORD: ICD-10-PCS | Mod: CPTII,S$GLB,, | Performed by: STUDENT IN AN ORGANIZED HEALTH CARE EDUCATION/TRAINING PROGRAM

## 2023-12-01 PROCEDURE — 3044F HG A1C LEVEL LT 7.0%: CPT | Mod: CPTII,S$GLB,, | Performed by: STUDENT IN AN ORGANIZED HEALTH CARE EDUCATION/TRAINING PROGRAM

## 2023-12-01 PROCEDURE — 99204 PR OFFICE/OUTPT VISIT, NEW, LEVL IV, 45-59 MIN: ICD-10-PCS | Mod: S$GLB,,, | Performed by: STUDENT IN AN ORGANIZED HEALTH CARE EDUCATION/TRAINING PROGRAM

## 2023-12-01 PROCEDURE — 1126F PR PAIN SEVERITY QUANTIFIED, NO PAIN PRESENT: ICD-10-PCS | Mod: CPTII,S$GLB,, | Performed by: STUDENT IN AN ORGANIZED HEALTH CARE EDUCATION/TRAINING PROGRAM

## 2023-12-01 PROCEDURE — 1160F RVW MEDS BY RX/DR IN RCRD: CPT | Mod: CPTII,S$GLB,, | Performed by: STUDENT IN AN ORGANIZED HEALTH CARE EDUCATION/TRAINING PROGRAM

## 2023-12-01 PROCEDURE — 1126F AMNT PAIN NOTED NONE PRSNT: CPT | Mod: CPTII,S$GLB,, | Performed by: STUDENT IN AN ORGANIZED HEALTH CARE EDUCATION/TRAINING PROGRAM

## 2023-12-01 PROCEDURE — 3078F PR MOST RECENT DIASTOLIC BLOOD PRESSURE < 80 MM HG: ICD-10-PCS | Mod: CPTII,S$GLB,, | Performed by: STUDENT IN AN ORGANIZED HEALTH CARE EDUCATION/TRAINING PROGRAM

## 2023-12-01 PROCEDURE — 3078F DIAST BP <80 MM HG: CPT | Mod: CPTII,S$GLB,, | Performed by: STUDENT IN AN ORGANIZED HEALTH CARE EDUCATION/TRAINING PROGRAM

## 2023-12-01 PROCEDURE — 1159F MED LIST DOCD IN RCRD: CPT | Mod: CPTII,S$GLB,, | Performed by: STUDENT IN AN ORGANIZED HEALTH CARE EDUCATION/TRAINING PROGRAM

## 2023-12-01 PROCEDURE — 3008F PR BODY MASS INDEX (BMI) DOCUMENTED: ICD-10-PCS | Mod: CPTII,S$GLB,, | Performed by: STUDENT IN AN ORGANIZED HEALTH CARE EDUCATION/TRAINING PROGRAM

## 2023-12-01 PROCEDURE — 3074F PR MOST RECENT SYSTOLIC BLOOD PRESSURE < 130 MM HG: ICD-10-PCS | Mod: CPTII,S$GLB,, | Performed by: STUDENT IN AN ORGANIZED HEALTH CARE EDUCATION/TRAINING PROGRAM

## 2023-12-01 PROCEDURE — 1160F PR REVIEW ALL MEDS BY PRESCRIBER/CLIN PHARMACIST DOCUMENTED: ICD-10-PCS | Mod: CPTII,S$GLB,, | Performed by: STUDENT IN AN ORGANIZED HEALTH CARE EDUCATION/TRAINING PROGRAM

## 2023-12-01 PROCEDURE — 99999 PR PBB SHADOW E&M-EST. PATIENT-LVL III: ICD-10-PCS | Mod: PBBFAC,,, | Performed by: STUDENT IN AN ORGANIZED HEALTH CARE EDUCATION/TRAINING PROGRAM

## 2023-12-01 PROCEDURE — 3074F SYST BP LT 130 MM HG: CPT | Mod: CPTII,S$GLB,, | Performed by: STUDENT IN AN ORGANIZED HEALTH CARE EDUCATION/TRAINING PROGRAM

## 2023-12-01 PROCEDURE — 99204 OFFICE O/P NEW MOD 45 MIN: CPT | Mod: S$GLB,,, | Performed by: STUDENT IN AN ORGANIZED HEALTH CARE EDUCATION/TRAINING PROGRAM

## 2023-12-01 PROCEDURE — 3008F BODY MASS INDEX DOCD: CPT | Mod: CPTII,S$GLB,, | Performed by: STUDENT IN AN ORGANIZED HEALTH CARE EDUCATION/TRAINING PROGRAM

## 2023-12-01 PROCEDURE — 3044F PR MOST RECENT HEMOGLOBIN A1C LEVEL <7.0%: ICD-10-PCS | Mod: CPTII,S$GLB,, | Performed by: STUDENT IN AN ORGANIZED HEALTH CARE EDUCATION/TRAINING PROGRAM

## 2023-12-01 NOTE — PROGRESS NOTES
Allergy and Immunology  New Patient Clinic Note    Date: 12/1/2023  Chief Complaint   Patient presents with    Other     Chemotherapy Reaction      Referred by: Orlando Rangel MD  [address] [facility]  [address]  [place] [address]    History  Casie Gaines is a 68 y.o. female being seen as a New Patient today.    Stage 4 Ovarian Cancer   Adverse Drug Reaction   Chemotherapy/Infusion Reaction   - Patient with tx with paclitaxel and carboplatin in the past years prior to current regimen   - Opens potential for sensitization   - 09/2023: reinitiated without issues or reaction   - 10/2023: patient reported near completion of infusion she felt warm in head, tachypnea/hyperventilation, sweating. Patient was given benadryl with improvement. [place] [employer] [place] noting no drop in BP, no urticaria/rash, no LOC, no n/v/d or other GI issues, no swelling.   - 11/2023: patient again with similar reaction as 10/2023. [place] [employer] [place] noting again - no drop in BP, no urticaria/rash, no LOC, no n/v/d or other GI issues, no swelling.   - Hx is more consistent with infusion reaction (vasovagal as well) than IgE mediated allergy  - Risk v benefit - better to follow desensitization protocol to ensure patient receives treatments as recommended during ideal timeframe     Rhinitis   - No hx of rhinitis     Chronic or Inducible Urticaria  - No hx of chronic urticaria    Asthma   - No hx of asthma     CRSwNP  - No hx of CRSwNP     Eczema   - No hx of eczema     Eosinophilic Esophagitis  - No hx of eosinophilic esophagitis     Food Allergy  - No hx of food allergy     Recurrent Infections  - No hx of recurrent infections     Venom Allergy  - No hx of venom allergy     Allergies, PMH, PSH, Social, and Family History were reviewed.    Review of patient's allergies indicates:  Not on File   Past Medical History:   Diagnosis Date    Anemia     Anxiety     Arthritis     knees    Cancer     ovarian    CHF (congestive heart failure)     Hx antineoplastic  chemotherapy     last 2019    Hyperlipemia     Hypertension     Neck pain     Ovarian cancer 2019    CHEMO    Peritoneal carcinomatosis 2019     Past Surgical History:   Procedure Laterality Date    breast reduction  10/02/2018    CATARACT EXTRACTION Bilateral     2023     SECTION      X 1    COLONOSCOPY N/A 2021    Procedure: COLONOSCOPY;  Surgeon: Paulette Rojas MD;  Location: [facility] [place] [facility];  Service: Gastroenterology;  Laterality: N/A;    CYSTOSCOPY W/ URETERAL STENT PLACEMENT Right 2019    Procedure: CYSTOSCOPY, WITH URETERAL STENT INSERTION;  Surgeon: Timmy Santiago IV, MD;  Location: [facility] [place] [facility];  Service: Urology;  Laterality: Right;    CYSTOSCOPY W/ URETERAL STENT REMOVAL  10/04/2019    DILATION AND CURETTAGE OF UTERUS      HYSTERECTOMY      RALH for fibroids (still has ovaries)    INSERTION OF VENOUS ACCESS PORT Left 2019    Procedure: INSERTION, VENOUS ACCESS PORT;  Surgeon: Ulisses Monzon MD;  Location: [facility] [place] OR;  Service: General;  Laterality: Left;  Left internal jugular     LYSIS OF ADHESIONS OF URETER N/A 08/15/2019    Procedure: URETEROLYSIS;  Surgeon: Ismael Juarez MD;  Location: [facility] OR;  Service: OB/GYN;  Laterality: N/A;    OMENTECTOMY N/A 08/15/2019    Procedure: OMENTECTOMY;  Surgeon: Ismael Juarez MD;  Location: [facility] OR;  Service: OB/GYN;  Laterality: N/A;    RETROGRADE PYELOGRAPHY Right 2019    Procedure: PYELOGRAM, RETROGRADE;  Surgeon: Timmy Santiago IV, MD;  Location: [facility] [place] [facility];  Service: Urology;  Laterality: Right;    ROBOT-ASSISTED LAPAROSCOPIC SALPINGO-OOPHORECTOMY USING DA TIA XI Bilateral 08/15/2019    Procedure: XI ROBOTIC SALPINGO-OOPHORECTOMY;  Surgeon: Ismael Juarez MD;  [facility];  Service: OB/GYN;  Laterality: Bilateral;    TOTAL REDUCTION MAMMOPLASTY  2018    TUBAL LIGATION       Social History     Social History Narrative    Live w/ spouse and 2 dogs      S/he reports that she quit smoking about 5 years ago. Her  smoking use included cigarettes. She started smoking about 30 years ago. She has a 25.0 pack-year smoking history. She has never used smokeless tobacco. She reports current alcohol use. She reports that she does not use drugs.    Current Outpatient Medications on File Prior to Visit   Medication Sig Dispense Refill    albuterol 90 mcg/actuation inhaler Inhale 2 puffs into the lungs every 4 (four) hours as needed for Wheezing. Rescue 18 g 0    ALPRAZolam (XANAX) 0.25 MG tablet Take 1 tablet (0.25 mg total) by mouth 3 (three) times daily as needed for Anxiety. 60 tablet 2    amLODIPine (NORVASC) 5 MG tablet Take 2 tablets (10 mg total) by mouth once daily. 180 tablet 3    atenoloL (TENORMIN) 25 MG tablet Take 1 tablet (25 mg total) by mouth once daily. 90 tablet 2    azelastine (ASTELIN) 137 mcg (0.1 %) nasal spray 1 spray (137 mcg total) by Nasal route 2 (two) times daily. 30 mL 0    biotin 1 mg tablet Take 1,000 mcg by mouth once daily.       cetirizine (ZYRTEC) 10 MG tablet Take 1 tablet (10 mg total) by mouth once daily. 30 tablet 5    dexAMETHasone (DECADRON) 4 MG Tab Take 2 tablets (8 mg total) by mouth once daily. Take as directed on days 2, 3 and 4 of your chemotherapy cycle. 6 tablet 11    diclofenac sodium (VOLTAREN) 1 % Gel Apply once a day to back as needed 100 g 1    EPINEPHrine (EPIPEN) 0.3 mg/0.3 mL AtIn Inject 0.3 mLs (0.3 mg total) into the muscle once. for 1 dose 2 each 0    fluticasone propionate (FLONASE) 50 mcg/actuation nasal spray 1 spray (50 mcg total) by Each Nostril route every 12 (twelve) hours as needed for Rhinitis. 16 g 0    gabapentin (NEURONTIN) 100 MG capsule Take 1 capsule (100 mg total) by mouth 3 (three) times daily. 90 capsule 11    ginkgo biloba 40 mg Tab Take 40 mg by mouth.      hydrOXYzine HCL (ATARAX) 25 MG tablet Take 1 tablet (25 mg total) by mouth 3 (three) times daily as needed for Itching. 90 tablet 1    magnesium oxide (MAG-OX) 400 mg (241.3 mg magnesium) tablet Take 1  tablet (400 mg total) by mouth once daily. 10 tablet 0    multivitamin with minerals tablet Take 1 tablet by mouth once daily.       OLANZapine (ZYPREXA) 5 MG tablet Take 1 tablet (5 mg total) by mouth every evening on days 1 through 4 of your chemotherapy regimen. 4 tablet 11    olmesartan-hydrochlorothiazide (BENICAR HCT) 40-25 mg per tablet Take 1 tablet by mouth once daily. 90 tablet 1    ondansetron (ZOFRAN-ODT) 8 MG TbDL Dissolve 1 tablet (8 mg total) under the tongue every 6 (six) hours as needed. 30 tablet 11    pantoprazole (PROTONIX) 40 MG tablet Take 1 tablet (40 mg total) by mouth once daily. 90 tablet 1    prochlorperazine (COMPAZINE) 5 MG tablet Take 2 tablets (10 mg total) by mouth every 6 (six) hours as needed (nausea or vomiting). 30 tablet 11    rosuvastatin (CRESTOR) 10 MG tablet Take 1 tablet (10 mg total) by mouth once daily. 90 tablet 1    sertraline (ZOLOFT) 25 MG tablet Take 1 tablet (25 mg total) by mouth once daily. 90 tablet 0    sumatriptan (IMITREX) 50 MG tablet Take 1 tablet (50 mg total) by mouth every 4 to 6 hours as needed for Migraine. 30 tablet 1    zinc gluconate 50 mg tablet Take 50 mg by mouth once daily.      diclofenac (VOLTAREN) 75 MG EC tablet Take 1 tablet (75 mg total) by mouth 2 (two) times daily. 30 tablet 0    potassium chloride SA (K-DUR,KLOR-CON) 20 MEQ tablet Take 1 tablet (20 mEq total) by mouth once daily. (Patient not taking: Reported on 12/1/2023) 14 tablet 0     No current facility-administered medications on file prior to visit.     Physical Examination  Vitals:    12/01/23 0935   BP: 119/71   Pulse: 79   Temp: 97.3 °F (36.3 °C)     GENERAL:  female in no apparent distress and well developed and well nourished  HEAD:  Normocephalic, without obvious abnormality, atraumatic  EYES: sclera anicteric, conjunctiva normochromic  EARS: normal TM's and external ear canals both ears  NOSE: without erythema or discharge, clear discharge, turbinates normal    OROPHARYNX:  moist mucous membranes without erythema, exudates or petechiae  LYMPH NODES: normal, supple, no lymphadenopathy  LUNGS: clear to auscultation, no wheezes, rales or rhonchi, symmetric air entry.  HEART: normal rate, regular rhythm, normal S1, S2, no murmurs, rubs, clicks or gallops.  ABDOMEN: soft, nontender, nondistended, no masses or organomegaly.  MUSCULOSKELETAL: no gross joint deformity or swelling.  NEURO: alert, oriented, normal speech, no focal findings or movement disorder noted.  SKIN: normal coloration and turgor, no rashes, no suspicious skin lesions noted.     Assessment/Plan:   Problem List Items Addressed This Visit          Oncology    Ovarian cancer, bilateral       Other    Drug reaction - Primary    Infusion reaction    Current Assessment & Plan     - Likely infusion reaction but recommend treating conservatively as IgE mediated allergic reaction  - Work with team for desensitization   - If no established protocol, can provide          Follow up:  Follow up in about 3 weeks (around 12/22/2023) for Virtual to check in with patient .    Jeremiah Etienne MD   [facility]  Allergy and Immunology

## 2023-12-01 NOTE — ASSESSMENT & PLAN NOTE
- Likely infusion reaction but recommend treating conservatively as IgE mediated allergic reaction  - Work with team for desensitization   - If no established protocol, can provide

## 2023-12-12 ENCOUNTER — HOSPITAL ENCOUNTER (OUTPATIENT)
Dept: RADIOLOGY | Facility: HOSPITAL | Age: 68
Discharge: HOME | End: 2023-12-12
Attending: INTERNAL MEDICINE
Payer: MEDICARE

## 2023-12-12 DIAGNOSIS — E83.42 HYPOMAGNESEMIA: ICD-10-CM

## 2023-12-12 DIAGNOSIS — E87.6 HYPOKALEMIA: ICD-10-CM

## 2023-12-12 DIAGNOSIS — C56.3 OVARIAN CANCER, BILATERAL: ICD-10-CM

## 2023-12-12 PROCEDURE — 74177 CT CHEST ABDOMEN PELVIS WITH IV CONTRAST (XPD): ICD-10-PCS | Mod: 26,,, | Performed by: RADIOLOGY

## 2023-12-12 PROCEDURE — 71260 CT THORAX DX C+: CPT | Mod: 26,,, | Performed by: RADIOLOGY

## 2023-12-12 PROCEDURE — 74177 CT ABD & PELVIS W/CONTRAST: CPT | Mod: TC

## 2023-12-12 PROCEDURE — 25500020 PHARM REV CODE 255: Performed by: INTERNAL MEDICINE

## 2023-12-12 PROCEDURE — A9698 NON-RAD CONTRAST MATERIALNOC: HCPCS | Performed by: INTERNAL MEDICINE

## 2023-12-12 PROCEDURE — 71260 CT CHEST ABDOMEN PELVIS WITH IV CONTRAST (XPD): ICD-10-PCS | Mod: 26,,, | Performed by: RADIOLOGY

## 2023-12-12 PROCEDURE — 74177 CT ABD & PELVIS W/CONTRAST: CPT | Mod: 26,,, | Performed by: RADIOLOGY

## 2023-12-12 PROCEDURE — 71260 CT THORAX DX C+: CPT | Mod: TC

## 2023-12-12 RX ADMIN — IOHEXOL 100 ML: 350 INJECTION, SOLUTION INTRAVENOUS at 10:12

## 2023-12-12 RX ADMIN — IOHEXOL 1000 ML: 9 SOLUTION ORAL at 10:12

## 2023-12-13 ENCOUNTER — OFFICE VISIT (OUTPATIENT)
Dept: INTERNAL MEDICINE | Facility: CLINIC | Age: 68
End: 2023-12-13
Payer: MEDICARE

## 2023-12-13 VITALS
DIASTOLIC BLOOD PRESSURE: 60 MMHG | HEIGHT: 67 IN | HEART RATE: 74 BPM | TEMPERATURE: 97 F | WEIGHT: 251.13 LBS | OXYGEN SATURATION: 95 % | SYSTOLIC BLOOD PRESSURE: 110 MMHG | BODY MASS INDEX: 39.42 KG/M2

## 2023-12-13 DIAGNOSIS — Z90.722 S/P BSO (BILATERAL SALPINGO-OOPHORECTOMY): ICD-10-CM

## 2023-12-13 DIAGNOSIS — F41.9 ANXIETY: ICD-10-CM

## 2023-12-13 DIAGNOSIS — I10 PRIMARY HYPERTENSION: ICD-10-CM

## 2023-12-13 DIAGNOSIS — T80.90XS INFUSION REACTION, SEQUELA: ICD-10-CM

## 2023-12-13 DIAGNOSIS — E78.2 MIXED HYPERLIPIDEMIA: ICD-10-CM

## 2023-12-13 DIAGNOSIS — C56.3 OVARIAN CANCER, BILATERAL: Primary | ICD-10-CM

## 2023-12-13 DIAGNOSIS — C78.6 PERITONEAL CARCINOMATOSIS: ICD-10-CM

## 2023-12-13 PROCEDURE — 1160F PR REVIEW ALL MEDS BY PRESCRIBER/CLIN PHARMACIST DOCUMENTED: ICD-10-PCS | Mod: CPTII,S$GLB,, | Performed by: FAMILY MEDICINE

## 2023-12-13 PROCEDURE — 1159F PR MEDICATION LIST DOCUMENTED IN MEDICAL RECORD: ICD-10-PCS | Mod: CPTII,S$GLB,, | Performed by: FAMILY MEDICINE

## 2023-12-13 PROCEDURE — 99999 PR PBB SHADOW E&M-EST. PATIENT-LVL IV: ICD-10-PCS | Mod: PBBFAC,,, | Performed by: FAMILY MEDICINE

## 2023-12-13 PROCEDURE — 1159F MED LIST DOCD IN RCRD: CPT | Mod: CPTII,S$GLB,, | Performed by: FAMILY MEDICINE

## 2023-12-13 PROCEDURE — 3288F PR FALLS RISK ASSESSMENT DOCUMENTED: ICD-10-PCS | Mod: CPTII,S$GLB,, | Performed by: FAMILY MEDICINE

## 2023-12-13 PROCEDURE — 3078F PR MOST RECENT DIASTOLIC BLOOD PRESSURE < 80 MM HG: ICD-10-PCS | Mod: CPTII,S$GLB,, | Performed by: FAMILY MEDICINE

## 2023-12-13 PROCEDURE — 3078F DIAST BP <80 MM HG: CPT | Mod: CPTII,S$GLB,, | Performed by: FAMILY MEDICINE

## 2023-12-13 PROCEDURE — 3074F SYST BP LT 130 MM HG: CPT | Mod: CPTII,S$GLB,, | Performed by: FAMILY MEDICINE

## 2023-12-13 PROCEDURE — 3074F PR MOST RECENT SYSTOLIC BLOOD PRESSURE < 130 MM HG: ICD-10-PCS | Mod: CPTII,S$GLB,, | Performed by: FAMILY MEDICINE

## 2023-12-13 PROCEDURE — 1101F PR PT FALLS ASSESS DOC 0-1 FALLS W/OUT INJ PAST YR: ICD-10-PCS | Mod: CPTII,S$GLB,, | Performed by: FAMILY MEDICINE

## 2023-12-13 PROCEDURE — 3008F BODY MASS INDEX DOCD: CPT | Mod: CPTII,S$GLB,, | Performed by: FAMILY MEDICINE

## 2023-12-13 PROCEDURE — 3044F HG A1C LEVEL LT 7.0%: CPT | Mod: CPTII,S$GLB,, | Performed by: FAMILY MEDICINE

## 2023-12-13 PROCEDURE — 1126F AMNT PAIN NOTED NONE PRSNT: CPT | Mod: CPTII,S$GLB,, | Performed by: FAMILY MEDICINE

## 2023-12-13 PROCEDURE — 1126F PR PAIN SEVERITY QUANTIFIED, NO PAIN PRESENT: ICD-10-PCS | Mod: CPTII,S$GLB,, | Performed by: FAMILY MEDICINE

## 2023-12-13 PROCEDURE — 3288F FALL RISK ASSESSMENT DOCD: CPT | Mod: CPTII,S$GLB,, | Performed by: FAMILY MEDICINE

## 2023-12-13 PROCEDURE — 1160F RVW MEDS BY RX/DR IN RCRD: CPT | Mod: CPTII,S$GLB,, | Performed by: FAMILY MEDICINE

## 2023-12-13 PROCEDURE — 3008F PR BODY MASS INDEX (BMI) DOCUMENTED: ICD-10-PCS | Mod: CPTII,S$GLB,, | Performed by: FAMILY MEDICINE

## 2023-12-13 PROCEDURE — 99999 PR PBB SHADOW E&M-EST. PATIENT-LVL IV: CPT | Mod: PBBFAC,,, | Performed by: FAMILY MEDICINE

## 2023-12-13 PROCEDURE — 99214 PR OFFICE/OUTPT VISIT, EST, LEVL IV, 30-39 MIN: ICD-10-PCS | Mod: S$GLB,,, | Performed by: FAMILY MEDICINE

## 2023-12-13 PROCEDURE — 1101F PT FALLS ASSESS-DOCD LE1/YR: CPT | Mod: CPTII,S$GLB,, | Performed by: FAMILY MEDICINE

## 2023-12-13 PROCEDURE — 3044F PR MOST RECENT HEMOGLOBIN A1C LEVEL <7.0%: ICD-10-PCS | Mod: CPTII,S$GLB,, | Performed by: FAMILY MEDICINE

## 2023-12-13 PROCEDURE — 99214 OFFICE O/P EST MOD 30 MIN: CPT | Mod: S$GLB,,, | Performed by: FAMILY MEDICINE

## 2023-12-13 NOTE — PROGRESS NOTES
Casie Frias Givens  12/13/2023  [id number]    Rossana Ang MD  Patient Care Team:  Rossana Ang MD as PCP - General (Family Medicine)  Radha Foley LPN as Care Coordinator (Internal Medicine)  Rita Sawant, PharmD as Pharmacist (Oncology)  Gerri Meyers RD (Inactive) as Dietitian (Nutrition)  Jessica Brink PharmD as Hypertension Digital Medicine Clinician (Pharmacist)  Rossana Ang MD as Hypertension Digital Medicine Responsible Provider (Family Medicine)  Madelaine Kraus as Digital Medicine Health           Visit Type:a scheduled routine follow-up visit    Chief Complaint:  Chief Complaint   Patient presents with    Follow-up     6 month f/u       History of Present Illness:    68 year old  Recurrent ovarian ca detected in [facility]  Patient with recurrent platinum sensitive stage IV ovarian cancer restarted on carboplatin and paclitaxel on 09/29/2023, completed 3 treatments but during the 2nd and 3rd treatment has reactions including shortness of breath, sweating and was noted to have hypoxia and hypotension.  Discussed the findings with the patient and suspicion for infusion reaction with recommendation to have urgent evaluation by allergy.  Patient is scheduled for an appointment tomorrow.  Also discussed with pharmacy for a plan for desensitization if agreed by the allergy doctors.  In the meantime requested repeat CT scan to reassess the disease and will discuss with the primary gyn Oncology for the recommendations   - prescribed potassium and magnesium for hypomagnesemia and hypokalemia    Stage 4 Ovarian Cancer   Adverse Drug Reaction   Chemotherapy/Infusion Reaction   - Patient with tx with paclitaxel and carboplatin in the past years prior to current regimen   - Opens potential for sensitization   - 09/2023: reinitiated without issues or reaction   - 10/2023: patient reported near completion of infusion she felt warm in head, tachypnea/hyperventilation, sweating. Patient was  given benadryl with improvement. [place] noting no drop in BP, no urticaria/rash, no LOC, no n/v/d or other GI issues, no swelling.   - 2023: patient again with similar reaction as 10/2023. [place] noting again - no drop in BP, no urticaria/rash, no LOC, no n/v/d or other GI issues, no swelling.   - Hx is more consistent with infusion reaction (vasovagal as well) than IgE mediated allergy  - Risk v benefit - better to follow desensitization protocol to ensure patient receives treatments as recommended during ideal timeframe    Repeat labs yeterday CMP fine and Mg fine    History of HTN,   Norvasc, Atenolol, and Benicar HCTZ  BP lower today, however recent BP up and down.    She remains on her Chol meds.    She has anxiety  On Benzo and Zyprexa at night.     is lower range today.      History:  Past Medical History:   Diagnosis Date    Anemia     Anxiety     Arthritis     knees    Cancer     ovarian    CHF (congestive heart failure)     Hx antineoplastic chemotherapy     last 2019    Hyperlipemia     Hypertension     Neck pain     Ovarian cancer 2019    CHEMO    Peritoneal carcinomatosis 2019     Past Surgical History:   Procedure Laterality Date    breast reduction  10/02/2018    CATARACT EXTRACTION Bilateral     2023     SECTION      X 1    COLONOSCOPY N/A 2021    Procedure: COLONOSCOPY;  Surgeon: Paultete Rojas MD;  Location: [facility];  Service: Gastroenterology;  Laterality: N/A;    CYSTOSCOPY W/ URETERAL STENT PLACEMENT Right 2019    Procedure: CYSTOSCOPY, WITH URETERAL STENT INSERTION;  Surgeon: Timmy Santiago IV, MD;  Location: [facility];  Service: Urology;  Laterality: Right;    CYSTOSCOPY W/ URETERAL STENT REMOVAL  10/04/2019    DILATION AND CURETTAGE OF UTERUS      HYSTERECTOMY      RALH for fibroids (still has ovaries)    INSERTION OF VENOUS ACCESS PORT Left 2019    Procedure: INSERTION, VENOUS ACCESS PORT;  Surgeon: Ulisses Monzon MD;  Location: [facility];   Service: General;  Laterality: Left;  Left internal jugular     LYSIS OF ADHESIONS OF URETER N/A 08/15/2019    Procedure: URETEROLYSIS;  Surgeon: Ismael Juarez MD;  Location: [facility] OR;  Service: OB/GYN;  Laterality: N/A;    OMENTECTOMY N/A 08/15/2019    Procedure: OMENTECTOMY;  Surgeon: Ismael Juarez MD;  Location: [facility] OR;  Service: OB/GYN;  Laterality: N/A;    RETROGRADE PYELOGRAPHY Right 2019    Procedure: PYELOGRAM, RETROGRADE;  Surgeon: Timmy Santiago IV, MD;  Location: [facility] OR;  Service: Urology;  Laterality: Right;    ROBOT-ASSISTED LAPAROSCOPIC SALPINGO-OOPHORECTOMY USING DA TIA XI Bilateral 08/15/2019    Procedure: XI ROBOTIC SALPINGO-OOPHORECTOMY;  Surgeon: Ismael Juarez MD;  Location: [facility];  Service: OB/GYN;  Laterality: Bilateral;    TOTAL REDUCTION MAMMOPLASTY  2018    TUBAL LIGATION       Family History   Problem Relation Age of Onset    Glaucoma Mother     Colon cancer Brother     Breast cancer Maternal Aunt     Breast cancer Paternal Aunt     Ovarian cancer Paternal Aunt     Anesthesia problems Other     Thrombophilia Neg Hx      Social History     Socioeconomic History    Marital status:    Tobacco Use    Smoking status: Former     Current packs/day: 0.00     Average packs/day: 1 pack/day for 25.0 years (25.0 ttl pk-yrs)     Types: Cigarettes     Start date: 10/1/1993     Quit date: 10/1/2018     Years since quittin.2    Smokeless tobacco: Never    Tobacco comments:     States started quit 2 months ago after 30 years   Substance and Sexual Activity    Alcohol use: Yes     Comment: occasionally  No alcohol 72h prior to sx    Drug use: No    Sexual activity: Not Currently     Partners: Male     Comment: hyst; mut monog   Social History Narrative    Live w/ spouse and 2 dogs      Social Determinants of Health     Financial Resource Strain: Low Risk  (2023)    Overall Financial Resource Strain (CARDIA)     Difficulty of Paying Living Expenses: Not hard at all   Food  Insecurity: No Food Insecurity (11/30/2023)    Hunger Vital Sign     Worried About Running Out of Food in the Last Year: Never true     Ran Out of Food in the Last Year: Never true   Transportation Needs: No Transportation Needs (11/30/2023)    PRAPARE - Transportation     Lack of Transportation (Medical): No     Lack of Transportation (Non-Medical): No   Physical Activity: Unknown (11/30/2023)    Exercise Vital Sign     Days of Exercise per Week: 0 days   Stress: No Stress Concern Present (11/30/2023)    [employer] - Occupational Stress Questionnaire     Feeling of Stress : Not at all   Social Connections: Unknown (11/30/2023)    Social Connection and Isolation Panel [NHANES]     Frequency of Communication with Friends and Family: More than three times a week     Frequency of Social Gatherings with Friends and Family: More than three times a week     Active Member of Clubs or Organizations: Yes     Attends Club or Organization Meetings: More than 4 times per year     Marital Status:    Housing Stability: Unknown (11/30/2023)    Housing Stability Vital Sign     Unable to Pay for Housing in the Last Year: No     Unstable Housing in the Last Year: No     Patient Active Problem List   Diagnosis    Hypertension    Osteoarthritis of both knees    History of CHF (congestive heart failure)    Hyperlipidemia    Peritoneal carcinomatosis    Morbid obesity    Status post placement of ureteral stent    Abnormal ECG    Pulmonary HTN    Ovarian cancer, bilateral    S/P BSO (bilateral salpingo-oophorectomy)    Encounter for antineoplastic chemotherapy    Anxiety    DDD (degenerative disc disease), cervical    Family history of colon cancer    Acute right-sided thoracic back pain    Acute right-sided low back pain without sciatica    Sciatic nerve pain    Vasovagal reaction    Drug reaction    Infusion reaction     Review of patient's allergies indicates:  No Known Allergies    The following  were reviewed at this visit: active problem list, medication list, allergies, family history, social history, and health maintenance.    Medications:  Current Outpatient Medications on File Prior to Visit   Medication Sig Dispense Refill    albuterol 90 mcg/actuation inhaler Inhale 2 puffs into the lungs every 4 (four) hours as needed for Wheezing. Rescue 18 g 0    ALPRAZolam (XANAX) 0.25 MG tablet Take 1 tablet (0.25 mg total) by mouth 3 (three) times daily as needed for Anxiety. 60 tablet 2    amLODIPine (NORVASC) 5 MG tablet Take 2 tablets (10 mg total) by mouth once daily. 180 tablet 3    atenoloL (TENORMIN) 25 MG tablet Take 1 tablet (25 mg total) by mouth once daily. 90 tablet 2    biotin 1 mg tablet Take 1,000 mcg by mouth once daily.       cetirizine (ZYRTEC) 10 MG tablet Take 1 tablet (10 mg total) by mouth once daily. 30 tablet 5    dexAMETHasone (DECADRON) 4 MG Tab Take 2 tablets (8 mg total) by mouth once daily. Take as directed on days 2, 3 and 4 of your chemotherapy cycle. 6 tablet 11    diclofenac sodium (VOLTAREN) 1 % Gel Apply once a day to back as needed 100 g 1    EPINEPHrine (EPIPEN) 0.3 mg/0.3 mL AtIn Inject 0.3 mLs (0.3 mg total) into the muscle once. for 1 dose 2 each 0    fluticasone propionate (FLONASE) 50 mcg/actuation nasal spray 1 spray (50 mcg total) by Each Nostril route every 12 (twelve) hours as needed for Rhinitis. 16 g 0    gabapentin (NEURONTIN) 100 MG capsule Take 1 capsule (100 mg total) by mouth 3 (three) times daily. 90 capsule 11    ginkgo biloba 40 mg Tab Take 40 mg by mouth.      hydrOXYzine HCL (ATARAX) 25 MG tablet Take 1 tablet (25 mg total) by mouth 3 (three) times daily as needed for Itching. 90 tablet 1    multivitamin with minerals tablet Take 1 tablet by mouth once daily.       OLANZapine (ZYPREXA) 5 MG tablet Take 1 tablet (5 mg total) by mouth every evening on days 1 through 4 of your chemotherapy regimen. 4 tablet 11    olmesartan-hydrochlorothiazide (BENICAR HCT)  40-25 mg per tablet Take 1 tablet by mouth once daily. 90 tablet 1    ondansetron (ZOFRAN-ODT) 8 MG TbDL Dissolve 1 tablet (8 mg total) under the tongue every 6 (six) hours as needed. 30 tablet 11    pantoprazole (PROTONIX) 40 MG tablet Take 1 tablet (40 mg total) by mouth once daily. 90 tablet 1    prochlorperazine (COMPAZINE) 5 MG tablet Take 2 tablets (10 mg total) by mouth every 6 (six) hours as needed (nausea or vomiting). 30 tablet 11    rosuvastatin (CRESTOR) 10 MG tablet Take 1 tablet (10 mg total) by mouth once daily. 90 tablet 1    sertraline (ZOLOFT) 25 MG tablet Take 1 tablet (25 mg total) by mouth once daily. 90 tablet 0    zinc gluconate 50 mg tablet Take 50 mg by mouth once daily.      azelastine (ASTELIN) 137 mcg (0.1 %) nasal spray 1 spray (137 mcg total) by Nasal route 2 (two) times daily. 30 mL 0    [DISCONTINUED] diclofenac (VOLTAREN) 75 MG EC tablet Take 1 tablet (75 mg total) by mouth 2 (two) times daily. 30 tablet 0    [DISCONTINUED] magnesium oxide (MAG-OX) 400 mg (241.3 mg magnesium) tablet Take 1 tablet (400 mg total) by mouth once daily. (Patient not taking: Reported on 12/13/2023) 10 tablet 0    [DISCONTINUED] potassium chloride SA (K-DUR,KLOR-CON) 20 MEQ tablet Take 1 tablet (20 mEq total) by mouth once daily. (Patient not taking: Reported on 12/1/2023) 14 tablet 0    [DISCONTINUED] sumatriptan (IMITREX) 50 MG tablet Take 1 tablet (50 mg total) by mouth every 4 to 6 hours as needed for Migraine. (Patient not taking: Reported on 12/13/2023) 30 tablet 1     Current Facility-Administered Medications on File Prior to Visit   Medication Dose Route Frequency Provider Last Rate Last Admin    [COMPLETED] iohexoL (OMNIPAQUE 350) injection 100 mL  100 mL Intravenous ONCE PRN Orlando Rangel MD   100 mL at 12/12/23 1016    [COMPLETED] iohexoL (OMNIPAQUE 9) oral solution 1,000 mL  1,000 mL Oral ONCE PRN Orlando Rangel MD   1,000 mL at 12/12/23 1016       Medications have been reviewed and  reconciled with patient at this visit.  Barriers to medications reviewed with patient.    Adverse reactions to current medications reviewed with patient..    Over the counter medications reviewed and reconciled with patient.    Exam:  Wt Readings from Last 3 Encounters:   12/13/23 113.9 kg (251 lb 1.7 oz)   12/01/23 113.4 kg (250 lb)   11/10/23 113.9 kg (250 lb 15.9 oz)     Temp Readings from Last 3 Encounters:   12/13/23 96.5 °F (35.8 °C) (Tympanic)   12/01/23 97.3 °F (36.3 °C)   11/10/23 97.5 °F (36.4 °C) (Oral)     BP Readings from Last 3 Encounters:   12/13/23 110/60   12/01/23 119/71   11/10/23 132/62     Pulse Readings from Last 3 Encounters:   12/13/23 74   12/01/23 79   11/10/23 73     Body mass index is 39.33 kg/m².      Review of Systems   Constitutional: Negative.  Negative for chills and fever.   HENT: Negative.  Negative for congestion, sinus pain and sore throat.    Eyes:  Negative for blurred vision and double vision.   Respiratory:  Negative for cough, sputum production, shortness of breath and wheezing.    Cardiovascular:  Negative for chest pain, palpitations and leg swelling.   Gastrointestinal:  Negative for abdominal pain, constipation, diarrhea, heartburn, nausea and vomiting.   Genitourinary: Negative.    Musculoskeletal: Negative.    Skin: Negative.  Negative for rash.   Neurological: Negative.    Endo/Heme/Allergies: Negative.  Negative for polydipsia. Does not bruise/bleed easily.   Psychiatric/Behavioral:  Negative for depression and substance abuse.      Physical Exam  Nursing note reviewed.   Pulmonary:      Effort: Pulmonary effort is normal. No respiratory distress.   Neurological:      Mental Status: She is alert and oriented to person, place, and time.   Psychiatric:         Mood and Affect: Mood normal.         Behavior: Behavior normal.         Thought Content: Thought content normal.         Judgment: Judgment normal.         Laboratory Reviewed ({Yes)  Lab Results   Component  Value Date    WBC 7.77 12/12/2023    HGB 10.8 (L) 12/12/2023    HCT 34.5 (L) 12/12/2023     12/12/2023    CHOL 163 01/06/2022    TRIG 71 01/06/2022    HDL 51 01/06/2022    ALT 17 12/12/2023    AST 20 12/12/2023     12/12/2023    K 3.7 12/12/2023     12/12/2023    CREATININE 0.8 12/12/2023    BUN 13 12/12/2023    CO2 25 12/12/2023    TSH 2.460 09/20/2023    INR 1.0 01/28/2019    HGBA1C 5.8 (H) 06/12/2023       Casie was seen today for follow-up.    Diagnoses and all orders for this visit:    Ovarian cancer, bilateral    S/P BSO (bilateral salpingo-oophorectomy)    Peritoneal carcinomatosis    Infusion reaction, sequela    Primary hypertension    Mixed hyperlipidemia  -     Lipid Panel; Future    Anxiety      No change in meds  Following with primary Onc Team right now  Allergy said infusion reaction and doing desensitization  CT scan ordered   now in lower range  CT scan done yesterday- no evidence of metastatic disease on report    Monitor BP  Can decrease Norvasc to 5 mg if lower. Then stop    CMP now normal.  Will need lipid  Schedule for 6 months              Care Plan/Goals: Reviewed    Goals          Patient Stated      Blood Pressure < 130/80 (pt-stated)        Other      Exercise at least 150 minutes per week.       Maintain a low sodium diet       American Heart Association (AHA) guidelines recommend less than 1500 mg of dietary salt per day.        Take at least one BP reading per week at various times of the day             Follow up: Follow up in about 6 months (around 6/13/2024) for Follow up LUIS.    After visit summary was printed and given to patient upon discharge today.  Patient goals and care plan are included in After Visit Summary.

## 2023-12-18 NOTE — PROGRESS NOTES
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  female with past medical history significant for hypertension, CHF here for follow up of ovarian cancer    Pt was diagnosed with peritoneal carcinomatosis and malignant right pleural effusion consistent with ovarian primary with CA-125 of 2740. PET scan demonstrated peritoneal carcinomatosis & extensive lymphadenopathy.  She was tried treated with neoadjuvant chemotherapy with carboplatin/Taxol/Avastin and had a right ureter stent placed due to postrenal obstruction from tumor. On 8/15/2019 underwent salpingoophorectomy. Pathology showed CR.  Patient was then treated with maintenance Avastin and later on discontinued and September of 2020.    She was noted on surveillance CT scan to have disease recurrence on 08/18/2023 and started on carboplatin and paclitaxel as platinum sensitive disease by gyn Oncology.     Interval History:     Patient here for 4th cycle of carboplatin and paclitaxel chemotherapy but refused to be treated today as would want to hold anymore treatments till new year.     After 2nd cycle of chemotherapy patient had nausea, shortness of breath, sweating and hypotension.  She was treated with IV fluids Compazine and steroids with improvement in symptoms.     After 3rd cycle she was noted to have nausea, sweating, shortness for breath and hypoxic on room air.  She was  transferred to the ER and manage symptomatically    Denied any rashes, swelling, vomiting / diarrhea, abdominal pain, cough, choking, wheezing. Denies any issues today. She had CT scan and tumor marker checked with good response to the treatment. Discussed with pharmacy and they have already adjusted the treatment plan based on desensitization protocol.         Oncology History   Peritoneal carcinomatosis   1/26/2019 Initial Diagnosis    Peritoneal carcinomatosis     2/15/2019 - 7/8/2019 Chemotherapy    Treatment Summary   Plan Name: OP GYN PACLITAXEL CARBOPLATIN (AUC 6) Q3W  Treatment Goal: Palliative  Status: Inactive  Start Date: 2/28/2019  End Date: 6/18/2019  Provider: Will Trevizo Jr., MD  Chemotherapy: bevacizumab (AVASTIN) 15 mg/kg = 1,600 mg in sodium chloride 0.9% 100 mL chemo infusion, 15 mg/kg = 1,600 mg (100 % of original dose 15 mg/kg), Intravenous, Clinic/HOD 1 time, 6 of 6 cycles  Dose modification: 15 mg/kg (original dose 15 mg/kg, Cycle 1)  Administration: 1,600 mg (2/28/2019), 1,600 mg (3/21/2019), 1,600 mg (4/11/2019), 1,600 mg (5/7/2019), 1,600 mg (5/28/2019), 1,510 mg (6/18/2019)  CARBOplatin (PARAPLATIN) 870 mg in sodium chloride 0.9% 337 mL chemo infusion, 870 mg (100 % of original dose 868.8 mg), Intravenous, Clinic/HOD 1 time, 6 of 6 cycles  Dose modification:   (original dose 868.8 mg, Cycle 1)  Administration: 870 mg (2/28/2019), 870 mg (3/21/2019), 900 mg (4/11/2019), 870 mg (5/7/2019), 660 mg (5/28/2019), 690 mg (6/18/2019)  PACLitaxel (TAXOL) 175 mg/m2 = 396 mg in sodium chloride 0.9% 566 mL chemo infusion, 175 mg/m2 = 396 mg, Intravenous, Clinic/HOD 1 time, 6 of 6 cycles  Dose modification: 140 mg/m2 (80 % of original dose 175 mg/m2, Cycle 5)  Administration: 396 mg (2/28/2019), 396 mg (3/21/2019), 396 mg (4/11/2019), 396 mg (5/7/2019), 318 mg (5/28/2019), 306 mg (6/18/2019)     10/9/2019 - 9/24/2020 Chemotherapy    Treatment Summary   Plan Name: OP BEVACIZUMAB Q3W   Treatment  Goal: Maintenance  Status: Inactive  Start Date: 10/9/2019  End Date: 9/24/2020  Provider: Oscar Mckeon MD  Chemotherapy: dexAMETHasone tablet 8 mg, 8 mg (100 % of original dose 8 mg), Oral, Clinic/HOD 1 time, 2 of 8 cycles  Dose modification: 8 mg (original dose 8 mg, Cycle 8), 8 mg (original dose 8 mg, Cycle 12)  Administration: 8 mg (7/22/2020), 8 mg (8/13/2020)  bevacizumab (AVASTIN) 15 mg/kg = 1,615 mg in sodium chloride 0.9% 100 mL chemo infusion, 15 mg/kg = 1,615 mg, Intravenous, Clinic/HOD 1 time, 11 of 17 cycles  Administration: 1,615 mg (10/9/2019), 1,615 mg (10/29/2019), 1,615 mg (12/10/2019), 1,615 mg (1/21/2020), 1,600 mg (4/2/2020), 1,615 mg (4/23/2020), 1,600 mg (7/1/2020), 1,600 mg (7/22/2020), 1,600 mg (8/13/2020), 1,795 mg (9/3/2020), 1,795 mg (9/24/2020)     9/29/2023 -  Chemotherapy    Treatment Summary   Plan Name: OP GYN PACLITAXEL CARBOPLATIN desensitization (AUC 5) Q3W  Treatment Goal: Control  Status: Active  Start Date: 9/29/2023  End Date: 9/3/2024 (Planned)  Provider: Ismael Juarez MD  Chemotherapy: CARBOplatin (PARAPLATIN) 650 mg in sodium chloride 0.9% 335 mL chemo infusion, 650 mg (100 % of original dose 648 mg), Intravenous, Clinic/HOD 1 time, 3 of 17 cycles  Dose modification:   (original dose 648 mg, Cycle 1),   (original dose 853.8 mg, Cycle 2),   (original dose 730 mg, Cycle 3),   (original dose 730 mg, Cycle 4), 5 mg (original dose 730 mg, Cycle 4), 7.3 mg (original dose 730 mg, Cycle 4), 5 mg (original dose 730 mg, Cycle 4, Reason: MD Discretion, Comment: desensitization), 73 mg (original dose 730 mg, Cycle 4), 720 mg (original dose 730 mg, Cycle 4, Reason: MD Discretion, Comment: desensitization), 648.97 mg (original dose 730 mg, Cycle 4, Reason: MD Discretion, Comment: desensitization)  Administration: 650 mg (9/29/2023), 730 mg (11/10/2023), 710 mg (10/20/2023)  PACLitaxeL (TAXOL) 175 mg/m2 = 402 mg in sodium chloride 0.9% 500 mL chemo infusion, 175 mg/m2 = 402 mg,  Intravenous, Clinic/HOD 1 time, 3 of 17 cycles  Administration: 402 mg (9/29/2023), 402 mg (10/20/2023), 408 mg (11/10/2023)     Malignant neoplasm of right ovary (Resolved)   2/15/2019 Initial Diagnosis    Malignant neoplasm of right ovary     2/15/2019 - 7/8/2019 Chemotherapy    Treatment Summary   Plan Name: OP GYN PACLITAXEL CARBOPLATIN (AUC 6) Q3W  Treatment Goal: Palliative  Status: Inactive  Start Date: 2/28/2019  End Date: 6/18/2019  Provider: Will Trevizo Jr., MD  Chemotherapy: bevacizumab (AVASTIN) 15 mg/kg = 1,600 mg in sodium chloride 0.9% 100 mL chemo infusion, 15 mg/kg = 1,600 mg (100 % of original dose 15 mg/kg), Intravenous, Clinic/HOD 1 time, 6 of 6 cycles  Dose modification: 15 mg/kg (original dose 15 mg/kg, Cycle 1)  Administration: 1,600 mg (2/28/2019), 1,600 mg (3/21/2019), 1,600 mg (4/11/2019), 1,600 mg (5/7/2019), 1,600 mg (5/28/2019), 1,510 mg (6/18/2019)  CARBOplatin (PARAPLATIN) 870 mg in sodium chloride 0.9% 337 mL chemo infusion, 870 mg (100 % of original dose 868.8 mg), Intravenous, Clinic/HOD 1 time, 6 of 6 cycles  Dose modification:   (original dose 868.8 mg, Cycle 1)  Administration: 870 mg (2/28/2019), 870 mg (3/21/2019), 900 mg (4/11/2019), 870 mg (5/7/2019), 660 mg (5/28/2019), 690 mg (6/18/2019)  PACLitaxel (TAXOL) 175 mg/m2 = 396 mg in sodium chloride 0.9% 566 mL chemo infusion, 175 mg/m2 = 396 mg, Intravenous, Clinic/HOD 1 time, 6 of 6 cycles  Dose modification: 140 mg/m2 (80 % of original dose 175 mg/m2, Cycle 5)  Administration: 396 mg (2/28/2019), 396 mg (3/21/2019), 396 mg (4/11/2019), 396 mg (5/7/2019), 318 mg (5/28/2019), 306 mg (6/18/2019)     10/9/2019 - 9/24/2020 Chemotherapy    Treatment Summary   Plan Name: OP BEVACIZUMAB Q3W   Treatment Goal: Maintenance  Status: Inactive  Start Date: 10/9/2019  End Date: 9/24/2020  Provider: Oscar Mckeon MD  Chemotherapy: dexAMETHasone tablet 8 mg, 8 mg (100 % of original dose 8 mg), Oral, Clinic/HOD 1 time, 2 of 8 cycles  Dose  modification: 8 mg (original dose 8 mg, Cycle 8), 8 mg (original dose 8 mg, Cycle 12)  Administration: 8 mg (7/22/2020), 8 mg (8/13/2020)  bevacizumab (AVASTIN) 15 mg/kg = 1,615 mg in sodium chloride 0.9% 100 mL chemo infusion, 15 mg/kg = 1,615 mg, Intravenous, Clinic/HOD 1 time, 11 of 17 cycles  Administration: 1,615 mg (10/9/2019), 1,615 mg (10/29/2019), 1,615 mg (12/10/2019), 1,615 mg (1/21/2020), 1,600 mg (4/2/2020), 1,615 mg (4/23/2020), 1,600 mg (7/1/2020), 1,600 mg (7/22/2020), 1,600 mg (8/13/2020), 1,795 mg (9/3/2020), 1,795 mg (9/24/2020)     Malignant neoplasm of both ovaries (Resolved)   2/18/2019 Initial Diagnosis    Ovarian cancer     10/9/2019 - 9/24/2020 Chemotherapy    Treatment Summary   Plan Name: OP BEVACIZUMAB Q3W   Treatment Goal: Maintenance  Status: Inactive  Start Date: 10/9/2019  End Date: 9/24/2020  Provider: Oscar Mckeon MD  Chemotherapy: dexAMETHasone tablet 8 mg, 8 mg (100 % of original dose 8 mg), Oral, Clinic/HOD 1 time, 2 of 8 cycles  Dose modification: 8 mg (original dose 8 mg, Cycle 8), 8 mg (original dose 8 mg, Cycle 12)  Administration: 8 mg (7/22/2020), 8 mg (8/13/2020)  bevacizumab (AVASTIN) 15 mg/kg = 1,615 mg in sodium chloride 0.9% 100 mL chemo infusion, 15 mg/kg = 1,615 mg, Intravenous, Clinic/HOD 1 time, 11 of 17 cycles  Administration: 1,615 mg (10/9/2019), 1,615 mg (10/29/2019), 1,615 mg (12/10/2019), 1,615 mg (1/21/2020), 1,600 mg (4/2/2020), 1,615 mg (4/23/2020), 1,600 mg (7/1/2020), 1,600 mg (7/22/2020), 1,600 mg (8/13/2020), 1,795 mg (9/3/2020), 1,795 mg (9/24/2020)     Ovarian cancer, bilateral   8/15/2019 Initial Diagnosis    Ovarian cancer, bilateral     10/9/2019 - 9/24/2020 Chemotherapy    Treatment Summary   Plan Name: OP BEVACIZUMAB Q3W   Treatment Goal: Maintenance  Status: Inactive  Start Date: 10/9/2019  End Date: 9/24/2020  Provider: Oscar Mckeon MD  Chemotherapy: dexAMETHasone tablet 8 mg, 8 mg (100 % of original dose 8 mg), Oral, Clinic/HOD 1 time, 2 of  8 cycles  Dose modification: 8 mg (original dose 8 mg, Cycle 8), 8 mg (original dose 8 mg, Cycle 12)  Administration: 8 mg (7/22/2020), 8 mg (8/13/2020)  bevacizumab (AVASTIN) 15 mg/kg = 1,615 mg in sodium chloride 0.9% 100 mL chemo infusion, 15 mg/kg = 1,615 mg, Intravenous, Clinic/HOD 1 time, 11 of 17 cycles  Administration: 1,615 mg (10/9/2019), 1,615 mg (10/29/2019), 1,615 mg (12/10/2019), 1,615 mg (1/21/2020), 1,600 mg (4/2/2020), 1,615 mg (4/23/2020), 1,600 mg (7/1/2020), 1,600 mg (7/22/2020), 1,600 mg (8/13/2020), 1,795 mg (9/3/2020), 1,795 mg (9/24/2020)     9/29/2023 -  Chemotherapy    Treatment Summary   Plan Name: OP GYN PACLITAXEL CARBOPLATIN desensitization (AUC 5) Q3W  Treatment Goal: Control  Status: Active  Start Date: 9/29/2023  End Date: 9/3/2024 (Planned)  Provider: Ismael Juarez MD  Chemotherapy: CARBOplatin (PARAPLATIN) 650 mg in sodium chloride 0.9% 335 mL chemo infusion, 650 mg (100 % of original dose 648 mg), Intravenous, Clinic/HOD 1 time, 3 of 17 cycles  Dose modification:   (original dose 648 mg, Cycle 1),   (original dose 853.8 mg, Cycle 2),   (original dose 730 mg, Cycle 3),   (original dose 730 mg, Cycle 4), 5 mg (original dose 730 mg, Cycle 4), 7.3 mg (original dose 730 mg, Cycle 4), 5 mg (original dose 730 mg, Cycle 4, Reason: MD Discretion, Comment: desensitization), 73 mg (original dose 730 mg, Cycle 4), 720 mg (original dose 730 mg, Cycle 4, Reason: MD Discretion, Comment: desensitization), 648.97 mg (original dose 730 mg, Cycle 4, Reason: MD Discretion, Comment: desensitization)  Administration: 650 mg (9/29/2023), 730 mg (11/10/2023), 710 mg (10/20/2023)  PACLitaxeL (TAXOL) 175 mg/m2 = 402 mg in sodium chloride 0.9% 500 mL chemo infusion, 175 mg/m2 = 402 mg, Intravenous, Clinic/HOD 1 time, 3 of 17 cycles  Administration: 402 mg (9/29/2023), 402 mg (10/20/2023), 408 mg (11/10/2023)         Past Medical History:   Diagnosis Date    Anemia     Anxiety     Arthritis     knees     Cancer     ovarian    CHF (congestive heart failure)     Hx antineoplastic chemotherapy     last 2019    Hyperlipemia     Hypertension     Neck pain     Ovarian cancer 2019    CHEMO    Peritoneal carcinomatosis 2019      Past Surgical History:   Procedure Laterality Date    breast reduction  10/02/2018    CATARACT EXTRACTION Bilateral     2023     SECTION      X 1    COLONOSCOPY N/A 2021    Procedure: COLONOSCOPY;  Surgeon: Paulette Rojas MD;  Location: [facility];  Service: Gastroenterology;  Laterality: N/A;    CYSTOSCOPY W/ URETERAL STENT PLACEMENT Right 2019    Procedure: CYSTOSCOPY, WITH URETERAL STENT INSERTION;  Surgeon: Timmy Santiago IV, MD;  Location: [facility] OR;  Service: Urology;  Laterality: Right;    CYSTOSCOPY W/ URETERAL STENT REMOVAL  10/04/2019    DILATION AND CURETTAGE OF UTERUS      HYSTERECTOMY      RALH for fibroids (still has ovaries)    INSERTION OF VENOUS ACCESS PORT Left 2019    Procedure: INSERTION, VENOUS ACCESS PORT;  Surgeon: Ulisses Monzon MD;  Location: [facility] OR;  Service: General;  Laterality: Left;  Left internal jugular     LYSIS OF ADHESIONS OF URETER N/A 08/15/2019    Procedure: URETEROLYSIS;  Surgeon: Ismael Juarez MD;  Location: [facility] OR;  Service: OB/GYN;  Laterality: N/A;    OMENTECTOMY N/A 08/15/2019    Procedure: OMENTECTOMY;  Surgeon: Ismael Juarez MD;  Location: [facility] OR;  Service: OB/GYN;  Laterality: N/A;    RETROGRADE PYELOGRAPHY Right 2019    Procedure: PYELOGRAM, RETROGRADE;  Surgeon: Timmy Santiago IV, MD;  Location: [facility] OR;  Service: Urology;  Laterality: Right;    ROBOT-ASSISTED LAPAROSCOPIC SALPINGO-OOPHORECTOMY USING DA TIA XI Bilateral 08/15/2019    Procedure: XI ROBOTIC SALPINGO-OOPHORECTOMY;  Surgeon: Ismael Juarez MD;  Location: [facility] OR;  Service: OB/GYN;  Laterality: Bilateral;    TOTAL REDUCTION MAMMOPLASTY  2018    TUBAL LIGATION       Social History     Socioeconomic History    Marital status:     Tobacco Use    Smoking status: Former     Current packs/day: 0.00     Average packs/day: 1 pack/day for 25.0 years (25.0 ttl pk-yrs)     Types: Cigarettes     Start date: 10/1/1993     Quit date: 10/1/2018     Years since quittin.2    Smokeless tobacco: Never    Tobacco comments:     States started quit 2 months ago after 30 years   Substance and Sexual Activity    Alcohol use: Yes     Comment: occasionally  No alcohol 72h prior to sx    Drug use: No    Sexual activity: Not Currently     Partners: Male     Comment: hyst; mut monog   Social History Narrative    Live w/ spouse and 2 dogs      Social Determinants of Health     Financial Resource Strain: Low Risk  (2023)    Overall Financial Resource Strain (CARDIA)     Difficulty of Paying Living Expenses: Not hard at all   Food Insecurity: No Food Insecurity (2023)    Hunger Vital Sign     Worried About Running Out of Food in the Last Year: Never true     Ran Out of Food in the Last Year: Never true   Transportation Needs: No Transportation Needs (2023)    PRAPARE - Transportation     Lack of Transportation (Medical): No     Lack of Transportation (Non-Medical): No   Physical Activity: Unknown (2023)    Exercise Vital Sign     Days of Exercise per Week: 0 days   Stress: No Stress Concern Present (2023)    [employer] [place] [employer] - Occupational Stress Questionnaire     Feeling of Stress : Not at all   Social Connections: Unknown (2023)    Social Connection and Isolation Panel [NHANES]     Frequency of Communication with Friends and Family: More than three times a week     Frequency of Social Gatherings with Friends and Family: More than three times a week     Active Member of Clubs or Organizations: Yes     Attends Club or Organization Meetings: More than 4 times per year     Marital Status:    Housing Stability: Unknown (2023)    Housing Stability Vital Sign     Unable to Pay for Housing  in the Last Year: No     Unstable Housing in the Last Year: No      Family History   Problem Relation Age of Onset    Glaucoma Mother     Colon cancer Brother     Breast cancer Maternal Aunt     Breast cancer Paternal Aunt     Ovarian cancer Paternal Aunt     Anesthesia problems Other     Thrombophilia Neg Hx           Review of patient's allergies indicates:  No Known Allergies     Review of Systems   Constitutional:  Positive for activity change and fatigue. Negative for chills and fever.   HENT: Negative.     Eyes: Negative.    Respiratory:  Negative for cough and shortness of breath.    Cardiovascular:  Negative for chest pain and leg swelling.   Gastrointestinal:  Negative for abdominal pain, constipation, diarrhea, nausea and vomiting.   Endocrine: Negative.    Genitourinary: Negative.    Musculoskeletal:  Negative for arthralgias and myalgias.   Integumentary:  Negative.   Allergic/Immunologic: Negative.    Neurological:  Negative for light-headedness, numbness and headaches.   Hematological: Negative.    Psychiatric/Behavioral: Negative.           Objective:        Vitals:    12/19/23 0939   BP: 110/61   Pulse: 81   Temp: 97.2 °F (36.2 °C)              Physical Exam  Constitutional:       Appearance: Normal appearance.   HENT:      Head: Atraumatic.   Eyes:      Extraocular Movements: Extraocular movements intact.   Pulmonary:      Effort: Pulmonary effort is normal. No respiratory distress.   Neurological:      Mental Status: She is alert and oriented to person, place, and time.   Psychiatric:         Mood and Affect: Mood normal.         Behavior: Behavior normal.           LABS / IMAGING      - 08/21/2023 CT CAP: Marked interval disease progression with small right pleural effusion and severe peritoneal carcinomatosis. There is a large left lower quadrant parietal peritoneal implant measuring 3.4 x 1.9 cm. There are even nodular implants seen within the ventral abdominal wall small hernias. The left lower  quadrant metastatic implant measures 4.0 x 5.6 x 4.7 cm (prior 1.3 x 1.0 x 1.2 cm). New left retroperitoneal nodule at the level of the aortic bifurcation measuring 1.7 x 1.5 cm.    - 12/12/2023 CT CAP: No CT evidence of recurrent or metastatic disease in the chest, abdomen or pelvis.               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  Patient was then treated with Avastin maintenance  - 08/21/2023 CT CAP: Marked interval disease progression with severe peritoneal carcinomatosis as detailed above.  - 12/12/2023 CT CAP: No CT evidence of recurrent or metastatic disease in the chest, abdomen or pelvis.  - : 7 >> 21 >> 32 >> 64 >> 11      Plan:     - Patient with recurrent platinum sensitive stage IV ovarian cancer restarted on carboplatin and paclitaxel on 09/29/2023, completed 3 treatments but during the 2nd and 3rd treatment had reactions including shortness of breath, sweating and was noted to have hypoxia and hypotension.  Discussed the findings with the patient and suspicion for infusion reaction and was then seen by allergy who recommended desensitization protocol for next treatment.. Also discussed with pharmacy for a plan for desensitization and have been placed. Repeat CT scan with no evidence of disease. Patient refused to be treated today and would consider next treatment after new year. Rescheduled and continue to treat based on GYN ONCOLOGY recommendations.   - MD/LABS/TREATMENT - 2 WEEKS         Med Onc Chart Routing  Urgent    Follow up with physician 2 weeks.   Follow up with [facility]    Infusion scheduling note   hold treatment today, return in 2 weeks for next treatment   Injection scheduling note    Labs CBC, CMP and magnesium   Scheduling:  Preferred lab:  Lab interval:  labs before next visit    Imaging    Pharmacy appointment    Other referrals                    The patient was seen, interviewed and examined. Pertinent lab and radiology studies were reviewed. Pt instructed to call should develop concerning signs/symptoms or have further questions.       Portions of the record may have been created with voice recognition software. Occasional wrong-word or sound-a-like substitutions may have occurred due to the inherent limitations of voice recognition software. Read the chart carefully and recognize, using context, where substitutions have occurred.    Orlando Rangel MD  Hematology / Oncology

## 2023-12-18 NOTE — PROGRESS NOTES
Subjective:      Patient ID: Casie Gaines is a 68 y.o. female.    Chief Complaint: Follow-up (6 month )      Treatment History  Stage IV serous ovarian cancer as proven by CT guided omental biopsy (initial CA-125 2740)  Right ureteral obstruction with indwelling ureteral stent  T/C/A started 2/28/19, s/p C6 on 6/18/19  Genetics negative  RA BSO/Right radical retroperitoneal dissection and ureterolysis/Omentectomy with complete pathologic response  Avastin maintenance since after surgery beginning 10/9/19, last 9/2020  Taxol/Carboplatin started 9/23 after documented recurrence.    Follow-up  Pertinent negatives include no abdominal pain, arthralgias, chest pain, chills, coughing, fatigue, fever, nausea, numbness, rash, sore throat, vomiting or weakness.     Here today for f/u.  Recurrence documented after CA-125 was noted to be rising.  Started on chemo as above.  Tolerating therapy.  Review of Systems   Constitutional:  Negative for activity change, appetite change, chills, fatigue and fever.   HENT:  Negative for hearing loss, mouth sores, nosebleeds, sore throat and tinnitus.    Eyes:  Negative for visual disturbance.   Respiratory:  Negative for cough, chest tightness, shortness of breath and wheezing.    Cardiovascular:  Negative for chest pain and leg swelling.   Gastrointestinal:  Negative for abdominal distention, abdominal pain, blood in stool, constipation, diarrhea, nausea and vomiting.   Genitourinary:  Negative for dysuria, flank pain, frequency, hematuria, pelvic pain, vaginal bleeding, vaginal discharge and vaginal pain.   Musculoskeletal:  Negative for arthralgias and back pain.   Skin:  Negative for rash.   Neurological:  Negative for dizziness, seizures, syncope, weakness and numbness.   Hematological:  Does not bruise/bleed easily.   Psychiatric/Behavioral:  Negative for confusion and sleep disturbance. The patient is not nervous/anxious.        Objective:   Physical Exam:   Constitutional:  She appears well-developed and well-nourished. No distress.    HENT:   Head: Normocephalic and atraumatic.    Eyes: No scleral icterus.     Cardiovascular:  Normal rate and intact distal pulses.      Exam reveals no cyanosis and no edema.        Pulmonary/Chest: Effort normal. No respiratory distress. She exhibits no tenderness.        Abdominal: Soft. She exhibits no distension (incisions healing well), no fluid wave and no mass. There is no abdominal tenderness. There is no rebound and no guarding. No hernia.     Genitourinary:    Vagina and rectum normal.      Pelvic exam was performed with patient supine.     Labial bartholins normal.There is no rash, tenderness or lesion on the right labia. There is no rash, tenderness or lesion on the left labia. Right adnexum displays no mass, no tenderness and no fullness. Left adnexum displays no mass, no tenderness and no fullness. No vaginal discharge, bleeding or prolapse of vaginal walls in the vagina. Cervix is absent.Uterus is absent.              Lymphadenopathy:     She has no cervical adenopathy. No inguinal adenopathy noted on the right or left side.     Skin: No cyanosis.        Assessment:     1. Encounter for antineoplastic chemotherapy    2. Peritoneal carcinomatosis    3. S/P BSO (bilateral salpingo-oophorectomy)    4. Ovarian cancer, bilateral        Plan:       Patient is doing well and is tolerating Taxol/Carboplatin retreatment, now s/p 4 cycles.  Will plan repeat imaging after C6.  RTC after C6.

## 2023-12-19 ENCOUNTER — OFFICE VISIT (OUTPATIENT)
Dept: HEMATOLOGY/ONCOLOGY | Facility: CLINIC | Age: 68
End: 2023-12-19
Payer: MEDICARE

## 2023-12-19 VITALS
WEIGHT: 250.56 LBS | TEMPERATURE: 97 F | BODY MASS INDEX: 39.33 KG/M2 | HEART RATE: 81 BPM | SYSTOLIC BLOOD PRESSURE: 110 MMHG | OXYGEN SATURATION: 98 % | DIASTOLIC BLOOD PRESSURE: 61 MMHG | HEIGHT: 67 IN

## 2023-12-19 DIAGNOSIS — C56.3 OVARIAN CANCER, BILATERAL: Primary | ICD-10-CM

## 2023-12-19 DIAGNOSIS — Z51.11 ENCOUNTER FOR ANTINEOPLASTIC CHEMOTHERAPY: ICD-10-CM

## 2023-12-19 PROCEDURE — 3078F DIAST BP <80 MM HG: CPT | Mod: CPTII,S$GLB,, | Performed by: INTERNAL MEDICINE

## 2023-12-19 PROCEDURE — 99214 PR OFFICE/OUTPT VISIT, EST, LEVL IV, 30-39 MIN: ICD-10-PCS | Mod: S$GLB,,, | Performed by: INTERNAL MEDICINE

## 2023-12-19 PROCEDURE — 3044F HG A1C LEVEL LT 7.0%: CPT | Mod: CPTII,S$GLB,, | Performed by: INTERNAL MEDICINE

## 2023-12-19 PROCEDURE — 3044F PR MOST RECENT HEMOGLOBIN A1C LEVEL <7.0%: ICD-10-PCS | Mod: CPTII,S$GLB,, | Performed by: INTERNAL MEDICINE

## 2023-12-19 PROCEDURE — 3288F FALL RISK ASSESSMENT DOCD: CPT | Mod: CPTII,S$GLB,, | Performed by: INTERNAL MEDICINE

## 2023-12-19 PROCEDURE — 99214 OFFICE O/P EST MOD 30 MIN: CPT | Mod: S$GLB,,, | Performed by: INTERNAL MEDICINE

## 2023-12-19 PROCEDURE — 3074F PR MOST RECENT SYSTOLIC BLOOD PRESSURE < 130 MM HG: ICD-10-PCS | Mod: CPTII,S$GLB,, | Performed by: INTERNAL MEDICINE

## 2023-12-19 PROCEDURE — 1101F PR PT FALLS ASSESS DOC 0-1 FALLS W/OUT INJ PAST YR: ICD-10-PCS | Mod: CPTII,S$GLB,, | Performed by: INTERNAL MEDICINE

## 2023-12-19 PROCEDURE — 99999 PR PBB SHADOW E&M-EST. PATIENT-LVL IV: ICD-10-PCS | Mod: PBBFAC,,, | Performed by: INTERNAL MEDICINE

## 2023-12-19 PROCEDURE — 1159F PR MEDICATION LIST DOCUMENTED IN MEDICAL RECORD: ICD-10-PCS | Mod: CPTII,S$GLB,, | Performed by: INTERNAL MEDICINE

## 2023-12-19 PROCEDURE — 1126F PR PAIN SEVERITY QUANTIFIED, NO PAIN PRESENT: ICD-10-PCS | Mod: CPTII,S$GLB,, | Performed by: INTERNAL MEDICINE

## 2023-12-19 PROCEDURE — 99999 PR PBB SHADOW E&M-EST. PATIENT-LVL IV: CPT | Mod: PBBFAC,,, | Performed by: INTERNAL MEDICINE

## 2023-12-19 PROCEDURE — 3008F PR BODY MASS INDEX (BMI) DOCUMENTED: ICD-10-PCS | Mod: CPTII,S$GLB,, | Performed by: INTERNAL MEDICINE

## 2023-12-19 PROCEDURE — 1101F PT FALLS ASSESS-DOCD LE1/YR: CPT | Mod: CPTII,S$GLB,, | Performed by: INTERNAL MEDICINE

## 2023-12-19 PROCEDURE — 3074F SYST BP LT 130 MM HG: CPT | Mod: CPTII,S$GLB,, | Performed by: INTERNAL MEDICINE

## 2023-12-19 PROCEDURE — 3008F BODY MASS INDEX DOCD: CPT | Mod: CPTII,S$GLB,, | Performed by: INTERNAL MEDICINE

## 2023-12-19 PROCEDURE — 3288F PR FALLS RISK ASSESSMENT DOCUMENTED: ICD-10-PCS | Mod: CPTII,S$GLB,, | Performed by: INTERNAL MEDICINE

## 2023-12-19 PROCEDURE — 1159F MED LIST DOCD IN RCRD: CPT | Mod: CPTII,S$GLB,, | Performed by: INTERNAL MEDICINE

## 2023-12-19 PROCEDURE — 1126F AMNT PAIN NOTED NONE PRSNT: CPT | Mod: CPTII,S$GLB,, | Performed by: INTERNAL MEDICINE

## 2023-12-19 PROCEDURE — 3078F PR MOST RECENT DIASTOLIC BLOOD PRESSURE < 80 MM HG: ICD-10-PCS | Mod: CPTII,S$GLB,, | Performed by: INTERNAL MEDICINE

## 2023-12-21 ENCOUNTER — OFFICE VISIT (OUTPATIENT)
Dept: ALLERGY | Facility: CLINIC | Age: 68
End: 2023-12-21
Payer: MEDICARE

## 2023-12-21 DIAGNOSIS — C56.3 OVARIAN CANCER, BILATERAL: ICD-10-CM

## 2023-12-21 DIAGNOSIS — T50.905D ADVERSE EFFECT OF DRUG, SUBSEQUENT ENCOUNTER: Primary | ICD-10-CM

## 2023-12-21 DIAGNOSIS — T80.90XD INFUSION REACTION, SUBSEQUENT ENCOUNTER: ICD-10-CM

## 2023-12-21 PROCEDURE — 1159F PR MEDICATION LIST DOCUMENTED IN MEDICAL RECORD: ICD-10-PCS | Mod: CPTII,95,, | Performed by: STUDENT IN AN ORGANIZED HEALTH CARE EDUCATION/TRAINING PROGRAM

## 2023-12-21 PROCEDURE — 1160F RVW MEDS BY RX/DR IN RCRD: CPT | Mod: CPTII,95,, | Performed by: STUDENT IN AN ORGANIZED HEALTH CARE EDUCATION/TRAINING PROGRAM

## 2023-12-21 PROCEDURE — 1159F MED LIST DOCD IN RCRD: CPT | Mod: CPTII,95,, | Performed by: STUDENT IN AN ORGANIZED HEALTH CARE EDUCATION/TRAINING PROGRAM

## 2023-12-21 PROCEDURE — 99214 OFFICE O/P EST MOD 30 MIN: CPT | Mod: 95,,, | Performed by: STUDENT IN AN ORGANIZED HEALTH CARE EDUCATION/TRAINING PROGRAM

## 2023-12-21 PROCEDURE — 1160F PR REVIEW ALL MEDS BY PRESCRIBER/CLIN PHARMACIST DOCUMENTED: ICD-10-PCS | Mod: CPTII,95,, | Performed by: STUDENT IN AN ORGANIZED HEALTH CARE EDUCATION/TRAINING PROGRAM

## 2023-12-21 PROCEDURE — 3044F PR MOST RECENT HEMOGLOBIN A1C LEVEL <7.0%: ICD-10-PCS | Mod: CPTII,95,, | Performed by: STUDENT IN AN ORGANIZED HEALTH CARE EDUCATION/TRAINING PROGRAM

## 2023-12-21 PROCEDURE — 99214 PR OFFICE/OUTPT VISIT, EST, LEVL IV, 30-39 MIN: ICD-10-PCS | Mod: 95,,, | Performed by: STUDENT IN AN ORGANIZED HEALTH CARE EDUCATION/TRAINING PROGRAM

## 2023-12-21 PROCEDURE — 3044F HG A1C LEVEL LT 7.0%: CPT | Mod: CPTII,95,, | Performed by: STUDENT IN AN ORGANIZED HEALTH CARE EDUCATION/TRAINING PROGRAM

## 2023-12-21 NOTE — ASSESSMENT & PLAN NOTE
- Discussed at length desensitization protocol and reasoning   - Answered questions   - Protocol per determined per pharmacy  - Patient waiting for next infusion until 01/2024  - Pt requested to continue to follow with Allergy until completion of chemo

## 2023-12-21 NOTE — PROGRESS NOTES
The patient location is: [facility]   The chief complaint leading to consultation is: F/U - Chemo desensitization     Visit type: audiovisual    Face to Face time with patient: 10  30 minutes of total time spent on the encounter, which includes face to face time and non-face to face time preparing to see the patient (eg, review of tests), Obtaining and/or reviewing separately obtained history, Documenting clinical information in the electronic or other health record, Independently interpreting results (not separately reported) and communicating results to the patient/family/caregiver, or Care coordination (not separately reported).     Each patient to whom he or she provides medical services by telemedicine is:  (1) informed of the relationship between the physician and patient and the respective role of any other health care provider with respect to management of the patient; and (2) notified that he or she may decline to receive medical services by telemedicine and may withdraw from such care at any time.    Allergy and Immunology  Established Patient Clinic Note    Date: 12/21/2023  Chief Complaint   Patient presents with    Follow-up     History  Casie Gaines is a 68 y.o. female being seen for follow-up today.    Stage 4 Ovarian Cancer   Adverse Drug Reaction   Chemotherapy/Infusion Reaction   - Discussed at length desensitization protocol and reasoning   - Answered questions   - Protocol per determined per pharmacy  - Patient waiting for next infusion until 01/2024  - Pt requested to continue to follow with Allergy until completion of chemo    Prior HPI   - Patient with tx with paclitaxel and carboplatin in the past years prior to current regimen   - Opens potential for sensitization   - 09/2023: reinitiated without issues or reaction   - 10/2023: patient reported near completion of infusion she felt warm in head, tachypnea/hyperventilation, sweating. Patient was given benadryl with improvement.  [place] noting no drop in BP, no urticaria/rash, no LOC, no n/v/d or other GI issues, no swelling.   - 11/2023: patient again with similar reaction as 10/2023. [place] noting again - no drop in BP, no urticaria/rash, no LOC, no n/v/d or other GI issues, no swelling.   - Hx is more consistent with infusion reaction (vasovagal as well) than IgE mediated allergy  - Risk v benefit - better to follow desensitization protocol to ensure patient receives treatments as recommended during ideal timeframe     Allergies, PMH, PSH, Social, and Family History were reviewed.    Current Outpatient Medications on File Prior to Visit   Medication Sig Dispense Refill    albuterol 90 mcg/actuation inhaler Inhale 2 puffs into the lungs every 4 (four) hours as needed for Wheezing. Rescue 18 g 0    ALPRAZolam (XANAX) 0.25 MG tablet Take 1 tablet (0.25 mg total) by mouth 3 (three) times daily as needed for Anxiety. 60 tablet 2    amLODIPine (NORVASC) 5 MG tablet Take 2 tablets (10 mg total) by mouth once daily. 180 tablet 3    atenoloL (TENORMIN) 25 MG tablet Take 1 tablet (25 mg total) by mouth once daily. 90 tablet 2    azelastine (ASTELIN) 137 mcg (0.1 %) nasal spray 1 spray (137 mcg total) by Nasal route 2 (two) times daily. 30 mL 0    biotin 1 mg tablet Take 1,000 mcg by mouth once daily.       cetirizine (ZYRTEC) 10 MG tablet Take 1 tablet (10 mg total) by mouth once daily. 30 tablet 5    dexAMETHasone (DECADRON) 4 MG Tab Take 2 tablets (8 mg total) by mouth once daily. Take as directed on days 2, 3 and 4 of your chemotherapy cycle. 6 tablet 11    diclofenac sodium (VOLTAREN) 1 % Gel Apply once a day to back as needed 100 g 1    EPINEPHrine (EPIPEN) 0.3 mg/0.3 mL AtIn Inject 0.3 mLs (0.3 mg total) into the muscle once. for 1 dose 2 each 0    fluticasone propionate (FLONASE) 50 mcg/actuation nasal spray 1 spray (50 mcg total) by Each Nostril route every 12 (twelve) hours as needed for Rhinitis. 16 g 0    gabapentin (NEURONTIN) 100 MG  capsule Take 1 capsule (100 mg total) by mouth 3 (three) times daily. 90 capsule 11    ginkgo biloba 40 mg Tab Take 40 mg by mouth.      hydrOXYzine HCL (ATARAX) 25 MG tablet Take 1 tablet (25 mg total) by mouth 3 (three) times daily as needed for Itching. 90 tablet 1    multivitamin with minerals tablet Take 1 tablet by mouth once daily.       OLANZapine (ZYPREXA) 5 MG tablet Take 1 tablet (5 mg total) by mouth every evening on days 1 through 4 of your chemotherapy regimen. 4 tablet 11    olmesartan-hydrochlorothiazide (BENICAR HCT) 40-25 mg per tablet Take 1 tablet by mouth once daily. 90 tablet 1    ondansetron (ZOFRAN-ODT) 8 MG TbDL Dissolve 1 tablet (8 mg total) under the tongue every 6 (six) hours as needed. 30 tablet 11    pantoprazole (PROTONIX) 40 MG tablet Take 1 tablet (40 mg total) by mouth once daily. 90 tablet 1    prochlorperazine (COMPAZINE) 5 MG tablet Take 2 tablets (10 mg total) by mouth every 6 (six) hours as needed (nausea or vomiting). 30 tablet 11    rosuvastatin (CRESTOR) 10 MG tablet Take 1 tablet (10 mg total) by mouth once daily. 90 tablet 1    sertraline (ZOLOFT) 25 MG tablet Take 1 tablet (25 mg total) by mouth once daily. 90 tablet 0    zinc gluconate 50 mg tablet Take 50 mg by mouth once daily.       No current facility-administered medications on file prior to visit.     Physical Examination  There were no vitals filed for this visit.  GENERAL:  female in no apparent distress and well developed and well nourished  HEAD:  Normocephalic, without obvious abnormality, atraumatic  EYES: sclera anicteric, conjunctiva normochromic  LUNGS: no tachypnea, retractions or cyanosis.  HEART: normal rate, regular rhythm, normal S1, S2, no murmurs, rubs, clicks or gallops, not examined.  ABDOMEN: soft, nontender, nondistended, no masses or organomegaly  not examined.  MUSCULOSKELETAL: no gross joint deformity or swelling.  NEURO: alert, oriented, normal speech, no focal findings or movement disorder  noted.  SKIN: normal coloration and turgor, no rashes, no suspicious skin lesions noted.     Assessment/Plan:   Problem List Items Addressed This Visit          Oncology    Ovarian cancer, bilateral       Other    Drug reaction - Primary    Current Assessment & Plan     - Discussed at length desensitization protocol and reasoning   - Answered questions   - Protocol per determined per pharmacy  - Patient waiting for next infusion until 01/2024  - Pt requested to continue to follow with Allergy until completion of chemo         Infusion reaction     Follow up:  Follow up in about 4 weeks (around 1/18/2024) for Virtual - chemo check in per patient request .    Jeremiah Etienne MD   [facility]  Allergy and Immunology

## 2023-12-29 ENCOUNTER — TELEPHONE (OUTPATIENT)
Dept: INFUSION THERAPY | Facility: HOSPITAL | Age: 68
End: 2023-12-29
Payer: MEDICARE

## 2023-12-29 NOTE — TELEPHONE ENCOUNTER
I contact patient and advised her that we needed to decouple her appointments.  That her new treatment plan made her infusion a lot longer.  Patient acknowledged understanding

## 2023-12-30 DIAGNOSIS — C56.3 OVARIAN CANCER, BILATERAL: Primary | ICD-10-CM

## 2024-01-01 ENCOUNTER — PATIENT MESSAGE (OUTPATIENT)
Dept: HEMATOLOGY/ONCOLOGY | Facility: CLINIC | Age: 69
End: 2024-01-01
Payer: MEDICARE

## 2024-01-02 ENCOUNTER — TELEPHONE (OUTPATIENT)
Dept: INFUSION THERAPY | Facility: HOSPITAL | Age: 69
End: 2024-01-02
Payer: MEDICARE

## 2024-01-02 ENCOUNTER — LAB VISIT (OUTPATIENT)
Dept: LAB | Facility: HOSPITAL | Age: 69
End: 2024-01-02
Attending: INTERNAL MEDICINE
Payer: MEDICARE

## 2024-01-02 ENCOUNTER — OFFICE VISIT (OUTPATIENT)
Dept: HEMATOLOGY/ONCOLOGY | Facility: CLINIC | Age: 69
End: 2024-01-02
Payer: MEDICARE

## 2024-01-02 VITALS
HEIGHT: 67 IN | OXYGEN SATURATION: 97 % | RESPIRATION RATE: 18 BRPM | DIASTOLIC BLOOD PRESSURE: 77 MMHG | TEMPERATURE: 98 F | BODY MASS INDEX: 40.16 KG/M2 | SYSTOLIC BLOOD PRESSURE: 137 MMHG | HEART RATE: 73 BPM | WEIGHT: 255.88 LBS

## 2024-01-02 DIAGNOSIS — C56.3 OVARIAN CANCER, BILATERAL: ICD-10-CM

## 2024-01-02 DIAGNOSIS — Z51.11 ENCOUNTER FOR ANTINEOPLASTIC CHEMOTHERAPY: ICD-10-CM

## 2024-01-02 DIAGNOSIS — C78.6 PERITONEAL CARCINOMATOSIS: ICD-10-CM

## 2024-01-02 DIAGNOSIS — C56.3 OVARIAN CANCER, BILATERAL: Primary | ICD-10-CM

## 2024-01-02 DIAGNOSIS — R10.32 LEFT GROIN PAIN: ICD-10-CM

## 2024-01-02 DIAGNOSIS — Z90.722 S/P BSO (BILATERAL SALPINGO-OOPHORECTOMY): ICD-10-CM

## 2024-01-02 DIAGNOSIS — R30.0 DYSURIA: ICD-10-CM

## 2024-01-02 LAB
ALBUMIN SERPL BCP-MCNC: 3.8 G/DL (ref 3.5–5.2)
ALP SERPL-CCNC: 114 U/L (ref 55–135)
ALT SERPL W/O P-5'-P-CCNC: 21 U/L (ref 10–44)
ANION GAP SERPL CALC-SCNC: 13 MMOL/L (ref 8–16)
AST SERPL-CCNC: 23 U/L (ref 10–40)
BASOPHILS # BLD AUTO: 0.06 K/UL (ref 0–0.2)
BASOPHILS NFR BLD: 0.9 % (ref 0–1.9)
BILIRUB SERPL-MCNC: 0.4 MG/DL (ref 0.1–1)
BUN SERPL-MCNC: 15 MG/DL (ref 8–23)
CALCIUM SERPL-MCNC: 9.4 MG/DL (ref 8.7–10.5)
CHLORIDE SERPL-SCNC: 106 MMOL/L (ref 95–110)
CO2 SERPL-SCNC: 26 MMOL/L (ref 23–29)
CREAT SERPL-MCNC: 0.8 MG/DL (ref 0.5–1.4)
DIFFERENTIAL METHOD BLD: ABNORMAL
EOSINOPHIL # BLD AUTO: 0.2 K/UL (ref 0–0.5)
EOSINOPHIL NFR BLD: 2.7 % (ref 0–8)
ERYTHROCYTE [DISTWIDTH] IN BLOOD BY AUTOMATED COUNT: 15.9 % (ref 11.5–14.5)
EST. GFR  (NO RACE VARIABLE): >60 ML/MIN/1.73 M^2
GLUCOSE SERPL-MCNC: 103 MG/DL (ref 70–110)
HCT VFR BLD AUTO: 35.6 % (ref 37–48.5)
HGB BLD-MCNC: 11 G/DL (ref 12–16)
IMM GRANULOCYTES # BLD AUTO: 0.02 K/UL (ref 0–0.04)
IMM GRANULOCYTES NFR BLD AUTO: 0.3 % (ref 0–0.5)
LYMPHOCYTES # BLD AUTO: 2.3 K/UL (ref 1–4.8)
LYMPHOCYTES NFR BLD: 33.2 % (ref 18–48)
MAGNESIUM SERPL-MCNC: 1.7 MG/DL (ref 1.6–2.6)
MCH RBC QN AUTO: 28.4 PG (ref 27–31)
MCHC RBC AUTO-ENTMCNC: 30.9 G/DL (ref 32–36)
MCV RBC AUTO: 92 FL (ref 82–98)
MONOCYTES # BLD AUTO: 0.5 K/UL (ref 0.3–1)
MONOCYTES NFR BLD: 6.9 % (ref 4–15)
NEUTROPHILS # BLD AUTO: 3.9 K/UL (ref 1.8–7.7)
NEUTROPHILS NFR BLD: 56 % (ref 38–73)
NRBC BLD-RTO: 0 /100 WBC
PLATELET # BLD AUTO: 239 K/UL (ref 150–450)
PMV BLD AUTO: 9.7 FL (ref 9.2–12.9)
POTASSIUM SERPL-SCNC: 3.5 MMOL/L (ref 3.5–5.1)
PROT SERPL-MCNC: 6.7 G/DL (ref 6–8.4)
RBC # BLD AUTO: 3.87 M/UL (ref 4–5.4)
SODIUM SERPL-SCNC: 145 MMOL/L (ref 136–145)
WBC # BLD AUTO: 6.99 K/UL (ref 3.9–12.7)

## 2024-01-02 PROCEDURE — 85025 COMPLETE CBC W/AUTO DIFF WBC: CPT | Performed by: INTERNAL MEDICINE

## 2024-01-02 PROCEDURE — 80053 COMPREHEN METABOLIC PANEL: CPT | Performed by: INTERNAL MEDICINE

## 2024-01-02 PROCEDURE — 99999 PR PBB SHADOW E&M-EST. PATIENT-LVL V: CPT | Mod: PBBFAC,,, | Performed by: INTERNAL MEDICINE

## 2024-01-02 PROCEDURE — 99215 OFFICE O/P EST HI 40 MIN: CPT | Mod: S$GLB,,, | Performed by: INTERNAL MEDICINE

## 2024-01-02 PROCEDURE — 83735 ASSAY OF MAGNESIUM: CPT | Performed by: INTERNAL MEDICINE

## 2024-01-02 PROCEDURE — 36415 COLL VENOUS BLD VENIPUNCTURE: CPT | Performed by: INTERNAL MEDICINE

## 2024-01-02 RX ORDER — DIPHENHYDRAMINE HYDROCHLORIDE 50 MG/ML
50 INJECTION, SOLUTION INTRAMUSCULAR; INTRAVENOUS
Status: CANCELLED | OUTPATIENT
Start: 2024-01-03

## 2024-01-02 RX ORDER — FAMOTIDINE 10 MG/ML
20 INJECTION INTRAVENOUS
Status: CANCELLED
Start: 2024-01-03

## 2024-01-02 RX ORDER — EPINEPHRINE 0.3 MG/.3ML
0.3 INJECTION SUBCUTANEOUS ONCE AS NEEDED
Status: CANCELLED | OUTPATIENT
Start: 2024-01-03

## 2024-01-02 RX ORDER — DIPHENHYDRAMINE HYDROCHLORIDE 50 MG/ML
50 INJECTION, SOLUTION INTRAMUSCULAR; INTRAVENOUS EVERY 6 HOURS PRN
Status: CANCELLED | OUTPATIENT
Start: 2024-01-03

## 2024-01-02 RX ORDER — FAMOTIDINE 10 MG/ML
20 INJECTION INTRAVENOUS
Status: CANCELLED | OUTPATIENT
Start: 2024-01-03

## 2024-01-02 RX ORDER — DIPHENHYDRAMINE HYDROCHLORIDE 50 MG/ML
25 INJECTION, SOLUTION INTRAMUSCULAR; INTRAVENOUS
Status: CANCELLED
Start: 2024-01-03

## 2024-01-02 RX ORDER — PROCHLORPERAZINE EDISYLATE 5 MG/ML
5 INJECTION INTRAMUSCULAR; INTRAVENOUS ONCE AS NEEDED
Status: CANCELLED
Start: 2024-01-03

## 2024-01-02 RX ORDER — ALBUTEROL SULFATE 2.5 MG/.5ML
2.5 SOLUTION RESPIRATORY (INHALATION) EVERY 4 HOURS PRN
Status: CANCELLED
Start: 2024-01-03

## 2024-01-02 RX ORDER — SODIUM CHLORIDE 0.9 % (FLUSH) 0.9 %
10 SYRINGE (ML) INJECTION
Status: CANCELLED | OUTPATIENT
Start: 2024-01-03

## 2024-01-02 RX ORDER — HEPARIN 100 UNIT/ML
500 SYRINGE INTRAVENOUS
Status: CANCELLED | OUTPATIENT
Start: 2024-01-03

## 2024-01-02 NOTE — PROGRESS NOTES
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 female who presents to clinic for follow up.    It is my first time meeting her.  Unfortunately with cycle 2 and 3 of carbo Taxol she had a likely infusion reaction.  She declined further chemotherapy until after the new year.  She is scheduled to start carboplatin desensitization tomorrow morning.    She feels well.  Her CT from 12/12/2023 showed no evidence of disease.  She is a little concerned about her break from treatment however her scan is reassuring.  He briefly discuss the possibility of maintenance therapy.    She also reports that she has been having a nagging left groin pain but only when her bladder is full; when this happens she does have difficulty walking.  After she eliminates she has no more discomfort or pain.  I will check a basic UA as well as groin ultrasound.  If this does not reveal anything and this continues to persist she may need to follow up with Gyn Onc sooner for earlier Gyn exam.        Review of Systems:  Review of Systems   Constitutional:  Negative for activity change, appetite change, chills, diaphoresis, fatigue, fever and unexpected weight change.   HENT:  Negative for nosebleeds.    Respiratory:  Negative for shortness of breath.    Cardiovascular:  Negative  for chest pain.   Gastrointestinal:  Negative for abdominal distention, abdominal pain, anal bleeding, blood in stool, constipation, diarrhea, nausea and vomiting.   Genitourinary:  Negative for difficulty urinating and hematuria.   Musculoskeletal:  Negative for arthralgias, back pain and myalgias.   Skin:  Negative for rash.   Neurological:  Negative for dizziness, weakness, light-headedness and headaches.   Hematological:  Does not bruise/bleed easily.   Psychiatric/Behavioral:  The patient is not nervous/anxious.      History     Oncology History   Peritoneal carcinomatosis   1/26/2019 Initial Diagnosis    Peritoneal carcinomatosis     2/15/2019 - 7/8/2019 Chemotherapy    Treatment Summary   Plan Name: OP GYN PACLITAXEL CARBOPLATIN (AUC 6) Q3W  Treatment Goal: Palliative  Status: Inactive  Start Date: 2/28/2019  End Date: 6/18/2019  Provider: Will Trevizo Jr., MD  Chemotherapy: bevacizumab (AVASTIN) 15 mg/kg = 1,600 mg in sodium chloride 0.9% 100 mL chemo infusion, 15 mg/kg = 1,600 mg (100 % of original dose 15 mg/kg), Intravenous, Clinic/HOD 1 time, 6 of 6 cycles  Dose modification: 15 mg/kg (original dose 15 mg/kg, Cycle 1)  Administration: 1,600 mg (2/28/2019), 1,600 mg (3/21/2019), 1,600 mg (4/11/2019), 1,600 mg (5/7/2019), 1,600 mg (5/28/2019), 1,510 mg (6/18/2019)  CARBOplatin (PARAPLATIN) 870 mg in sodium chloride 0.9% 337 mL chemo infusion, 870 mg (100 % of original dose 868.8 mg), Intravenous, Clinic/HOD 1 time, 6 of 6 cycles  Dose modification:   (original dose 868.8 mg, Cycle 1)  Administration: 870 mg (2/28/2019), 870 mg (3/21/2019), 900 mg (4/11/2019), 870 mg (5/7/2019), 660 mg (5/28/2019), 690 mg (6/18/2019)  PACLitaxel (TAXOL) 175 mg/m2 = 396 mg in sodium chloride 0.9% 566 mL chemo infusion, 175 mg/m2 = 396 mg, Intravenous, Clinic/HOD 1 time, 6 of 6 cycles  Dose modification: 140 mg/m2 (80 % of original dose 175 mg/m2, Cycle 5)  Administration: 396 mg (2/28/2019), 396 mg (3/21/2019), 396 mg  (4/11/2019), 396 mg (5/7/2019), 318 mg (5/28/2019), 306 mg (6/18/2019)     10/9/2019 - 9/24/2020 Chemotherapy    Treatment Summary   Plan Name: OP BEVACIZUMAB Q3W   Treatment Goal: Maintenance  Status: Inactive  Start Date: 10/9/2019  End Date: 9/24/2020  Provider: Oscar Mckeon MD  Chemotherapy: dexAMETHasone tablet 8 mg, 8 mg (100 % of original dose 8 mg), Oral, Clinic/HOD 1 time, 2 of 8 cycles  Dose modification: 8 mg (original dose 8 mg, Cycle 8), 8 mg (original dose 8 mg, Cycle 12)  Administration: 8 mg (7/22/2020), 8 mg (8/13/2020)  bevacizumab (AVASTIN) 15 mg/kg = 1,615 mg in sodium chloride 0.9% 100 mL chemo infusion, 15 mg/kg = 1,615 mg, Intravenous, Clinic/HOD 1 time, 11 of 17 cycles  Administration: 1,615 mg (10/9/2019), 1,615 mg (10/29/2019), 1,615 mg (12/10/2019), 1,615 mg (1/21/2020), 1,600 mg (4/2/2020), 1,615 mg (4/23/2020), 1,600 mg (7/1/2020), 1,600 mg (7/22/2020), 1,600 mg (8/13/2020), 1,795 mg (9/3/2020), 1,795 mg (9/24/2020)     9/29/2023 -  Chemotherapy    Treatment Summary   Plan Name: OP GYN PACLITAXEL CARBOPLATIN desensitization (AUC 5) Q3W  Treatment Goal: Control  Status: Active  Start Date: 9/29/2023  End Date: 10/2/2024 (Planned)  Provider: Ismael Juarez MD  Chemotherapy: CARBOplatin (PARAPLATIN) 650 mg in sodium chloride 0.9% 335 mL chemo infusion, 650 mg (100 % of original dose 648 mg), Intravenous, Clinic/HOD 1 time, 4 of 17 cycles  Dose modification:   (original dose 648 mg, Cycle 1),   (original dose 853.8 mg, Cycle 2),   (original dose 730 mg, Cycle 3),   (original dose 730 mg, Cycle 4), 5 mg (original dose 730 mg, Cycle 4), 7.3 mg (original dose 730 mg, Cycle 4), 5 mg (original dose 730 mg, Cycle 4, Reason: MD Discretion, Comment: desensitization), 73 mg (original dose 730 mg, Cycle 4), 720 mg (original dose 730 mg, Cycle 4, Reason: MD Discretion, Comment: desensitization), 648.97 mg (original dose 730 mg, Cycle 4, Reason: MD Discretion, Comment: desensitization)  Administration:  650 mg (9/29/2023), 730 mg (11/10/2023), 710 mg (10/20/2023)  PACLitaxeL (TAXOL) 175 mg/m2 = 402 mg in sodium chloride 0.9% 500 mL chemo infusion, 175 mg/m2 = 402 mg, Intravenous, Clinic/HOD 1 time, 4 of 17 cycles  Administration: 402 mg (9/29/2023), 402 mg (10/20/2023), 408 mg (11/10/2023)     Malignant neoplasm of right ovary (Resolved)   2/15/2019 Initial Diagnosis    Malignant neoplasm of right ovary     2/15/2019 - 7/8/2019 Chemotherapy    Treatment Summary   Plan Name: OP GYN PACLITAXEL CARBOPLATIN (AUC 6) Q3W  Treatment Goal: Palliative  Status: Inactive  Start Date: 2/28/2019  End Date: 6/18/2019  Provider: Will Trevizo Jr., MD  Chemotherapy: bevacizumab (AVASTIN) 15 mg/kg = 1,600 mg in sodium chloride 0.9% 100 mL chemo infusion, 15 mg/kg = 1,600 mg (100 % of original dose 15 mg/kg), Intravenous, Clinic/HOD 1 time, 6 of 6 cycles  Dose modification: 15 mg/kg (original dose 15 mg/kg, Cycle 1)  Administration: 1,600 mg (2/28/2019), 1,600 mg (3/21/2019), 1,600 mg (4/11/2019), 1,600 mg (5/7/2019), 1,600 mg (5/28/2019), 1,510 mg (6/18/2019)  CARBOplatin (PARAPLATIN) 870 mg in sodium chloride 0.9% 337 mL chemo infusion, 870 mg (100 % of original dose 868.8 mg), Intravenous, Clinic/HOD 1 time, 6 of 6 cycles  Dose modification:   (original dose 868.8 mg, Cycle 1)  Administration: 870 mg (2/28/2019), 870 mg (3/21/2019), 900 mg (4/11/2019), 870 mg (5/7/2019), 660 mg (5/28/2019), 690 mg (6/18/2019)  PACLitaxel (TAXOL) 175 mg/m2 = 396 mg in sodium chloride 0.9% 566 mL chemo infusion, 175 mg/m2 = 396 mg, Intravenous, Clinic/HOD 1 time, 6 of 6 cycles  Dose modification: 140 mg/m2 (80 % of original dose 175 mg/m2, Cycle 5)  Administration: 396 mg (2/28/2019), 396 mg (3/21/2019), 396 mg (4/11/2019), 396 mg (5/7/2019), 318 mg (5/28/2019), 306 mg (6/18/2019)     10/9/2019 - 9/24/2020 Chemotherapy    Treatment Summary   Plan Name: OP BEVACIZUMAB Q3W   Treatment Goal: Maintenance  Status: Inactive  Start Date: 10/9/2019  End  Date: 9/24/2020  Provider: Oscar Mckeon MD  Chemotherapy: dexAMETHasone tablet 8 mg, 8 mg (100 % of original dose 8 mg), Oral, Clinic/HOD 1 time, 2 of 8 cycles  Dose modification: 8 mg (original dose 8 mg, Cycle 8), 8 mg (original dose 8 mg, Cycle 12)  Administration: 8 mg (7/22/2020), 8 mg (8/13/2020)  bevacizumab (AVASTIN) 15 mg/kg = 1,615 mg in sodium chloride 0.9% 100 mL chemo infusion, 15 mg/kg = 1,615 mg, Intravenous, Clinic/HOD 1 time, 11 of 17 cycles  Administration: 1,615 mg (10/9/2019), 1,615 mg (10/29/2019), 1,615 mg (12/10/2019), 1,615 mg (1/21/2020), 1,600 mg (4/2/2020), 1,615 mg (4/23/2020), 1,600 mg (7/1/2020), 1,600 mg (7/22/2020), 1,600 mg (8/13/2020), 1,795 mg (9/3/2020), 1,795 mg (9/24/2020)     Malignant neoplasm of both ovaries (Resolved)   2/18/2019 Initial Diagnosis    Ovarian cancer     10/9/2019 - 9/24/2020 Chemotherapy    Treatment Summary   Plan Name: OP BEVACIZUMAB Q3W   Treatment Goal: Maintenance  Status: Inactive  Start Date: 10/9/2019  End Date: 9/24/2020  Provider: Oscar Mckeon MD  Chemotherapy: dexAMETHasone tablet 8 mg, 8 mg (100 % of original dose 8 mg), Oral, Clinic/HOD 1 time, 2 of 8 cycles  Dose modification: 8 mg (original dose 8 mg, Cycle 8), 8 mg (original dose 8 mg, Cycle 12)  Administration: 8 mg (7/22/2020), 8 mg (8/13/2020)  bevacizumab (AVASTIN) 15 mg/kg = 1,615 mg in sodium chloride 0.9% 100 mL chemo infusion, 15 mg/kg = 1,615 mg, Intravenous, Clinic/HOD 1 time, 11 of 17 cycles  Administration: 1,615 mg (10/9/2019), 1,615 mg (10/29/2019), 1,615 mg (12/10/2019), 1,615 mg (1/21/2020), 1,600 mg (4/2/2020), 1,615 mg (4/23/2020), 1,600 mg (7/1/2020), 1,600 mg (7/22/2020), 1,600 mg (8/13/2020), 1,795 mg (9/3/2020), 1,795 mg (9/24/2020)     Ovarian cancer, bilateral   8/15/2019 Initial Diagnosis    Ovarian cancer, bilateral     10/9/2019 - 9/24/2020 Chemotherapy    Treatment Summary   Plan Name: OP BEVACIZUMAB Q3W   Treatment Goal: Maintenance  Status: Inactive  Start Date:  10/9/2019  End Date: 9/24/2020  Provider: Oscar Mckeon MD  Chemotherapy: dexAMETHasone tablet 8 mg, 8 mg (100 % of original dose 8 mg), Oral, Clinic/HOD 1 time, 2 of 8 cycles  Dose modification: 8 mg (original dose 8 mg, Cycle 8), 8 mg (original dose 8 mg, Cycle 12)  Administration: 8 mg (7/22/2020), 8 mg (8/13/2020)  bevacizumab (AVASTIN) 15 mg/kg = 1,615 mg in sodium chloride 0.9% 100 mL chemo infusion, 15 mg/kg = 1,615 mg, Intravenous, Clinic/HOD 1 time, 11 of 17 cycles  Administration: 1,615 mg (10/9/2019), 1,615 mg (10/29/2019), 1,615 mg (12/10/2019), 1,615 mg (1/21/2020), 1,600 mg (4/2/2020), 1,615 mg (4/23/2020), 1,600 mg (7/1/2020), 1,600 mg (7/22/2020), 1,600 mg (8/13/2020), 1,795 mg (9/3/2020), 1,795 mg (9/24/2020)     9/29/2023 -  Chemotherapy    Treatment Summary   Plan Name: OP GYN PACLITAXEL CARBOPLATIN desensitization (AUC 5) Q3W  Treatment Goal: Control  Status: Active  Start Date: 9/29/2023  End Date: 10/2/2024 (Planned)  Provider: Ismael Juarez MD  Chemotherapy: CARBOplatin (PARAPLATIN) 650 mg in sodium chloride 0.9% 335 mL chemo infusion, 650 mg (100 % of original dose 648 mg), Intravenous, Clinic/HOD 1 time, 4 of 17 cycles  Dose modification:   (original dose 648 mg, Cycle 1),   (original dose 853.8 mg, Cycle 2),   (original dose 730 mg, Cycle 3),   (original dose 730 mg, Cycle 4), 5 mg (original dose 730 mg, Cycle 4), 7.3 mg (original dose 730 mg, Cycle 4), 5 mg (original dose 730 mg, Cycle 4, Reason: MD Discretion, Comment: desensitization), 73 mg (original dose 730 mg, Cycle 4), 720 mg (original dose 730 mg, Cycle 4, Reason: MD Discretion, Comment: desensitization), 648.97 mg (original dose 730 mg, Cycle 4, Reason: MD Discretion, Comment: desensitization)  Administration: 650 mg (9/29/2023), 730 mg (11/10/2023), 710 mg (10/20/2023)  PACLitaxeL (TAXOL) 175 mg/m2 = 402 mg in sodium chloride 0.9% 500 mL chemo infusion, 175 mg/m2 = 402 mg, Intravenous, Clinic/HOD 1 time, 4 of 17  cycles  Administration: 402 mg (2023), 402 mg (10/20/2023), 408 mg (11/10/2023)           Past Medical History:   Diagnosis Date    Anemia     Anxiety     Arthritis     knees    Cancer     ovarian    CHF (congestive heart failure)     Hx antineoplastic chemotherapy     last 2019    Hyperlipemia     Hypertension     Neck pain     Ovarian cancer 2019    CHEMO    Peritoneal carcinomatosis 2019       Past Surgical History:   Procedure Laterality Date    breast reduction  10/02/2018    CATARACT EXTRACTION Bilateral     2023     SECTION      X 1    COLONOSCOPY N/A 2021    Procedure: COLONOSCOPY;  Surgeon: Paulette Rojas MD;  Location: [facility] ENDO;  Service: Gastroenterology;  Laterality: N/A;    CYSTOSCOPY W/ URETERAL STENT PLACEMENT Right 2019    Procedure: CYSTOSCOPY, WITH URETERAL STENT INSERTION;  Surgeon: Timmy Santiago IV, MD;  Location: [facility] OR;  Service: Urology;  Laterality: Right;    CYSTOSCOPY W/ URETERAL STENT REMOVAL  10/04/2019    DILATION AND CURETTAGE OF UTERUS      HYSTERECTOMY      RALH for fibroids (still has ovaries)    INSERTION OF VENOUS ACCESS PORT Left 2019    Procedure: INSERTION, VENOUS ACCESS PORT;  Surgeon: Ulisses Monzon MD;  Location: [facility] OR;  Service: General;  Laterality: Left;  Left internal jugular     LYSIS OF ADHESIONS OF URETER N/A 08/15/2019    Procedure: URETEROLYSIS;  Surgeon: Ismael Juarez MD;  Location: [facility] OR;  Service: OB/GYN;  Laterality: N/A;    OMENTECTOMY N/A 08/15/2019    Procedure: OMENTECTOMY;  Surgeon: Ismael Juarez MD;  Location: [facility] OR;  Service: OB/GYN;  Laterality: N/A;    RETROGRADE PYELOGRAPHY Right 2019    Procedure: PYELOGRAM, RETROGRADE;  Surgeon: Timmy Santiago IV, MD;  Location: [facility] OR;  Service: Urology;  Laterality: Right;    ROBOT-ASSISTED LAPAROSCOPIC SALPINGO-OOPHORECTOMY USING DA TIA XI Bilateral 08/15/2019    Procedure: XI ROBOTIC SALPINGO-OOPHORECTOMY;  Surgeon: Ismael Juarez MD;   "Location: [facility] OR;  Service: OB/GYN;  Laterality: Bilateral;    TOTAL REDUCTION MAMMOPLASTY  2018    TUBAL LIGATION         Family History   Problem Relation Age of Onset    Glaucoma Mother     Colon cancer Brother     Breast cancer Maternal Aunt     Breast cancer Paternal Aunt     Ovarian cancer Paternal Aunt     Anesthesia problems Other     Thrombophilia Neg Hx        Review of patient's allergies indicates:  No Known Allergies    Social History     Tobacco Use    Smoking status: Former     Current packs/day: 0.00     Average packs/day: 1 pack/day for 25.0 years (25.0 ttl pk-yrs)     Types: Cigarettes     Start date: 10/1/1993     Quit date: 10/1/2018     Years since quittin.2    Smokeless tobacco: Never    Tobacco comments:     States started quit 2 months ago after 30 years   Substance Use Topics    Alcohol use: Yes     Comment: occasionally  No alcohol 72h prior to sx    Drug use: No       Physical Exam   ECOG:   ECOG SCORE    0 - Fully active-able to carry on all pre-disease performance without restriction          Vitals:  /77   Pulse 73   Temp 97.8 °F (36.6 °C)   Resp 18   Ht 5' 7" (1.702 m)   Wt 116 kg (255 lb 13.5 oz)   SpO2 97%   BMI 40.07 kg/m²     Physical Exam:  Physical Exam  Constitutional:       General: She is not in acute distress.     Appearance: Normal appearance. She is not ill-appearing.   HENT:      Head: Normocephalic and atraumatic.   Eyes:      Extraocular Movements: Extraocular movements intact.      Conjunctiva/sclera: Conjunctivae normal.   Cardiovascular:      Rate and Rhythm: Normal rate.   Pulmonary:      Effort: Pulmonary effort is normal. No respiratory distress.   Abdominal:      General: There is no distension.      Palpations: Abdomen is soft. There is no hepatomegaly or splenomegaly.      Tenderness: There is no abdominal tenderness.   Musculoskeletal:         General: Normal range of motion.   Skin:     Findings: No rash.   Neurological:      General: No " focal deficit present.      Mental Status: She is alert and oriented to person, place, and time.   Psychiatric:         Mood and Affect: Mood normal.         Behavior: Behavior normal.         Thought Content: Thought content normal.            Labs   Labs:  Lab Visit on 01/02/2024   Component Date Value Ref Range Status    Specimen UA 01/02/2024 Urine, Clean Catch   Final    Color, UA 01/02/2024 Yellow  Yellow, Straw, [place] Final    Appearance, UA 01/02/2024 Clear  Clear Final    pH, UA 01/02/2024 6.0  5.0 - 8.0 Final    Specific Gravity, UA 01/02/2024 1.020  1.005 - 1.030 Final    Protein, UA 01/02/2024 Negative  Negative Final    Comment: Recommend a 24 hour urine protein or a urine   protein/creatinine ratio if globulin induced proteinuria is  clinically suspected.      Glucose, UA 01/02/2024 Negative  Negative Final    Ketones, UA 01/02/2024 Negative  Negative Final    Bilirubin (UA) 01/02/2024 Negative  Negative Final    Occult Blood UA 01/02/2024 Negative  Negative Final    Nitrite, UA 01/02/2024 Negative  Negative Final    Leukocytes, UA 01/02/2024 Negative  Negative Final   Lab Visit on 01/02/2024   Component Date Value Ref Range Status    Magnesium 01/02/2024 1.7  1.6 - 2.6 mg/dL Final    Sodium 01/02/2024 145  136 - 145 mmol/L Final    Potassium 01/02/2024 3.5  3.5 - 5.1 mmol/L Final    Chloride 01/02/2024 106  95 - 110 mmol/L Final    CO2 01/02/2024 26  23 - 29 mmol/L Final    Glucose 01/02/2024 103  70 - 110 mg/dL Final    BUN 01/02/2024 15  8 - 23 mg/dL Final    Creatinine 01/02/2024 0.8  0.5 - 1.4 mg/dL Final    Calcium 01/02/2024 9.4  8.7 - 10.5 mg/dL Final    Total Protein 01/02/2024 6.7  6.0 - 8.4 g/dL Final    Albumin 01/02/2024 3.8  3.5 - 5.2 g/dL Final    Total Bilirubin 01/02/2024 0.4  0.1 - 1.0 mg/dL Final    Comment: For infants and newborns, interpretation of results should be based  on gestational age, weight and in agreement with clinical  observations.    Premature Infant recommended  reference ranges:  Up to 24 hours.............<8.0 mg/dL  Up to 48 hours............<12.0 mg/dL  3-5 days..................<15.0 mg/dL  6-29 days.................<15.0 mg/dL      Alkaline Phosphatase 01/02/2024 114  55 - 135 U/L Final    AST 01/02/2024 23  10 - 40 U/L Final    ALT 01/02/2024 21  10 - 44 U/L Final    eGFR 01/02/2024 >60  >60 mL/min/1.73 m^2 Final    Anion Gap 01/02/2024 13  8 - 16 mmol/L Final    WBC 01/02/2024 6.99  3.90 - 12.70 K/uL Final    RBC 01/02/2024 3.87 (L)  4.00 - 5.40 M/uL Final    Hemoglobin 01/02/2024 11.0 (L)  12.0 - 16.0 g/dL Final    Hematocrit 01/02/2024 35.6 (L)  37.0 - 48.5 % Final    MCV 01/02/2024 92  82 - 98 fL Final    MCH 01/02/2024 28.4  27.0 - 31.0 pg Final    MCHC 01/02/2024 30.9 (L)  32.0 - 36.0 g/dL Final    RDW 01/02/2024 15.9 (H)  11.5 - 14.5 % Final    Platelets 01/02/2024 239  150 - 450 K/uL Final    MPV 01/02/2024 9.7  9.2 - 12.9 fL Final    Immature Granulocytes 01/02/2024 0.3  0.0 - 0.5 % Final    Gran # (ANC) 01/02/2024 3.9  1.8 - 7.7 K/uL Final    Immature Grans (Abs) 01/02/2024 0.02  0.00 - 0.04 K/uL Final    Comment: Mild elevation in immature granulocytes is non specific and   can be seen in a variety of conditions including stress response,   acute inflammation, trauma and pregnancy. Correlation with other   laboratory and clinical findings is essential.      Lymph # 01/02/2024 2.3  1.0 - 4.8 K/uL Final    Mono # 01/02/2024 0.5  0.3 - 1.0 K/uL Final    Eos # 01/02/2024 0.2  0.0 - 0.5 K/uL Final    Baso # 01/02/2024 0.06  0.00 - 0.20 K/uL Final    nRBC 01/02/2024 0  0 /100 WBC Final    Gran % 01/02/2024 56.0  38.0 - 73.0 % Final    Lymph % 01/02/2024 33.2  18.0 - 48.0 % Final    Mono % 01/02/2024 6.9  4.0 - 15.0 % Final    Eosinophil % 01/02/2024 2.7  0.0 - 8.0 % Final    Basophil % 01/02/2024 0.9  0.0 - 1.9 % Final    Differential Method 01/02/2024 Automated   Final        Imaging   CT Chest Abdomen Pelvis With IV Contrast (XPD) NO Oral Contrast  12/12/2023  Order: [id number]  Status: Final result       Visible to patient: Yes (not seen)       Next appt: 01/03/2024 at 09:00 AM in Chemotherapy ([facility])       Dx: Hypokalemia; Hypomagnesemia; Ovarian ...    0 Result Notes  Details    Reading Physician Reading Date Result Priority   Michele Kim MD  [phone] 12/12/2023 STAT     Narrative & Impression  EXAMINATION:  CT CHEST ABDOMEN PELVIS WITH IV CONTRAST (XPD)     CLINICAL HISTORY:  Hypokalemia,follow up ovarian cancer on treatment; evaluate treatment response for subsequent treatment planning.     TECHNIQUE:  Axial CT imaging was performed through the chest, abdomen and pelvis with  100cc  of intravenous contrast. Multiplanar reformats were performed and interpreted.     COMPARISON:  08/21/2023, 09/29/2022 and 09/24/2020     FINDINGS:  Chest:     The heart, great vessels, and mediastinal structures are within normal limits. No thoracic adenopathy.  Left-sided MediPort in the SVC.  Aortic atherosclerosis.     The lungs are clear bilaterally. No pleural effusions.     Abdomen:     The liver, spleen, kidneys, adrenal glands are within normal limits.  Stable 10 mm uncinate process pancreatic cyst.  No biliary ductal dilatation.     The gallbladder is unremarkable.     No free fluid, free air, or inflammatory change.     The small bowel is within normal limits.  Colonic diverticulosis.  Supraumbilical abdominal wall hernia containing a nondistended segment of the transverse colon.  No evidence of strangulation or incarceration.  The appendix is normal.     Aortic atherosclerosis without evidence of aneurysm.     Pelvis:     The urinary bladder is unremarkable. The uterus has been removed.  No free pelvic fluid or adenopathy.     No significant osseous abnormality is identified.  No suspicious osseous lesions.     Impression:     No CT evidence of recurrent or metastatic disease in the chest, abdomen or pelvis.     All CT scans at this facility are  performed  using dose modulation techniques as appropriate to performed exam including the following:  automated exposure control; adjustment of mA and/or kV according to the patients size (this includes techniques or standardized protocols for targeted exams where dose is matched to indication/reason for exam: i.e. extremities or head);  iterative reconstruction technique.        Assessment and Plan   Recurrent, Stage IV Serous Ovarian Cancer with Peritoneal Carcinomatosis - 01/2019   Treated with neoadjuvant chemotherapy with carboplatin paclitaxel and Avastin (02/2019 - 07/2019), followed by surgical resection in August 2019.  Patient was then treated with Avastin maintenance  08/21/2023 CT CAP: Marked interval disease progression with severe peritoneal carcinomatosis as detailed above.  12/12/2023 CT CAP: No CT evidence of recurrent or metastatic disease in the chest, abdomen or pelvis.  : 7 >> 21 >> 32 >> 64 >> 11  Started Carbo/Taxol 09/29/2023  C1 no complications  C2 and C3 (11/10/23): reaction consisting of feeling presyncopal, SOB, sweating; vitals significant for hypoxia and hypotension; evaluated by AI and recs for desensitization  Per medical record, patient declined treatment until after the new year  CT CAP 12/12/23 showed CLYDE  Plan for C4 01/03/2023 with desensitization  Continue follow up with Gyn Onc      Chemotherapy Induced Neuropathy  Intermittent neuropathy in feet  Continue to monitor      Left Groin Pain  Only experiences when bladder full  Will obtain UA and ultrasound of the groin; she may need early gyn exam        Chronic Medical Conditions  Osteoarthritis  HTN  Anxiety  Hx CHF        Med Onc Chart Routing      Follow up with physician 1 week.   Follow up with [facility]    Infusion scheduling note   C4 w/Carbo Desensitization Initiation 01/03/2024   Injection scheduling note    Labs CMP, CBC, phosphorus and magnesium   Scheduling:  Preferred lab:  Lab interval:     Imaging    Pharmacy  appointment    Other referrals                     The patient was seen, interviewed and examined. Pertinent lab and radiologic studies were reviewed. Pt instructed to call should they develop concerning signs/symptoms or have further questions.        Portions of the record may have been created with voice recognition software. Occasional wrong-word or sound-a-like substitutions may have occurred due to the inherent limitations of voice recognition software. Read the chart carefully and recognize, using context, where substitutions have occurred.      Torri Pugh MD    Hematology/Oncology

## 2024-01-02 NOTE — Clinical Note
Good morning Dr. Juarez,  I am the new Med Onc following Mrs. Gaines. She will begin C4 Carbo/Taxol today with desensitization.

## 2024-01-03 ENCOUNTER — INFUSION (OUTPATIENT)
Dept: INFUSION THERAPY | Facility: HOSPITAL | Age: 69
End: 2024-01-03
Attending: INTERNAL MEDICINE
Payer: MEDICARE

## 2024-01-03 VITALS
BODY MASS INDEX: 40.16 KG/M2 | HEART RATE: 84 BPM | RESPIRATION RATE: 16 BRPM | SYSTOLIC BLOOD PRESSURE: 151 MMHG | OXYGEN SATURATION: 97 % | TEMPERATURE: 97 F | DIASTOLIC BLOOD PRESSURE: 80 MMHG | HEIGHT: 67 IN | WEIGHT: 255.88 LBS

## 2024-01-03 DIAGNOSIS — C56.3 OVARIAN CANCER, BILATERAL: ICD-10-CM

## 2024-01-03 DIAGNOSIS — C78.6 PERITONEAL CARCINOMATOSIS: Primary | ICD-10-CM

## 2024-01-03 PROCEDURE — 96376 TX/PRO/DX INJ SAME DRUG ADON: CPT

## 2024-01-03 PROCEDURE — 63600175 PHARM REV CODE 636 W HCPCS: Performed by: INTERNAL MEDICINE

## 2024-01-03 PROCEDURE — 96417 CHEMO IV INFUS EACH ADDL SEQ: CPT

## 2024-01-03 PROCEDURE — 96367 TX/PROPH/DG ADDL SEQ IV INF: CPT

## 2024-01-03 PROCEDURE — 96415 CHEMO IV INFUSION ADDL HR: CPT

## 2024-01-03 PROCEDURE — 25000003 PHARM REV CODE 250: Performed by: INTERNAL MEDICINE

## 2024-01-03 PROCEDURE — 96413 CHEMO IV INFUSION 1 HR: CPT

## 2024-01-03 PROCEDURE — 96375 TX/PRO/DX INJ NEW DRUG ADDON: CPT

## 2024-01-03 RX ORDER — DIPHENHYDRAMINE HYDROCHLORIDE 50 MG/ML
25 INJECTION, SOLUTION INTRAMUSCULAR; INTRAVENOUS
Status: COMPLETED | OUTPATIENT
Start: 2024-01-03 | End: 2024-01-03

## 2024-01-03 RX ORDER — MONTELUKAST SODIUM 10 MG/1
10 TABLET ORAL ONCE
Status: COMPLETED | OUTPATIENT
Start: 2024-01-03 | End: 2024-01-03

## 2024-01-03 RX ORDER — FAMOTIDINE 10 MG/ML
20 INJECTION INTRAVENOUS
Status: COMPLETED | OUTPATIENT
Start: 2024-01-03 | End: 2024-01-03

## 2024-01-03 RX ORDER — FAMOTIDINE 10 MG/ML
20 INJECTION INTRAVENOUS
Status: DISCONTINUED | OUTPATIENT
Start: 2024-01-03 | End: 2024-01-03 | Stop reason: HOSPADM

## 2024-01-03 RX ORDER — MONTELUKAST SODIUM 10 MG/1
10 TABLET ORAL NIGHTLY
Qty: 30 TABLET | Refills: 1 | Status: SHIPPED | OUTPATIENT
Start: 2024-01-03 | End: 2024-03-15

## 2024-01-03 RX ORDER — DIPHENHYDRAMINE HYDROCHLORIDE 50 MG/ML
50 INJECTION, SOLUTION INTRAMUSCULAR; INTRAVENOUS
Status: DISCONTINUED | OUTPATIENT
Start: 2024-01-03 | End: 2024-01-03 | Stop reason: HOSPADM

## 2024-01-03 RX ORDER — MONTELUKAST SODIUM 10 MG/1
10 TABLET ORAL ONCE
Status: CANCELLED
Start: 2024-01-03 | End: 2024-01-03

## 2024-01-03 RX ORDER — HEPARIN 100 UNIT/ML
500 SYRINGE INTRAVENOUS
Status: DISCONTINUED | OUTPATIENT
Start: 2024-01-03 | End: 2024-01-03 | Stop reason: HOSPADM

## 2024-01-03 RX ADMIN — CARBOPLATIN 5 MG: 10 INJECTION, SOLUTION INTRAVENOUS at 02:01

## 2024-01-03 RX ADMIN — FAMOTIDINE 20 MG: 10 INJECTION, SOLUTION INTRAVENOUS at 08:01

## 2024-01-03 RX ADMIN — DIPHENHYDRAMINE HYDROCHLORIDE 25 MG: 50 INJECTION, SOLUTION INTRAMUSCULAR; INTRAVENOUS at 12:01

## 2024-01-03 RX ADMIN — APREPITANT 130 MG: 130 INJECTION, EMULSION INTRAVENOUS at 08:01

## 2024-01-03 RX ADMIN — PACLITAXEL 408 MG: 6 INJECTION, SOLUTION INTRAVENOUS at 10:01

## 2024-01-03 RX ADMIN — CARBOPLATIN 650 MG: 10 INJECTION, SOLUTION INTRAVENOUS at 03:01

## 2024-01-03 RX ADMIN — HEPARIN 500 UNITS: 100 SYRINGE at 05:01

## 2024-01-03 RX ADMIN — MONTELUKAST 10 MG: 10 TABLET, FILM COATED ORAL at 09:01

## 2024-01-03 RX ADMIN — DEXAMETHASONE SODIUM PHOSPHATE 10 MG: 4 INJECTION, SOLUTION INTRA-ARTICULAR; INTRALESIONAL; INTRAMUSCULAR; INTRAVENOUS; SOFT TISSUE at 12:01

## 2024-01-03 RX ADMIN — DIPHENHYDRAMINE HYDROCHLORIDE 50 MG: 50 INJECTION, SOLUTION INTRAMUSCULAR; INTRAVENOUS at 08:01

## 2024-01-03 RX ADMIN — DEXAMETHASONE SODIUM PHOSPHATE 0.25 MG: 4 INJECTION, SOLUTION INTRA-ARTICULAR; INTRALESIONAL; INTRAMUSCULAR; INTRAVENOUS; SOFT TISSUE at 09:01

## 2024-01-03 RX ADMIN — DIPHENHYDRAMINE HYDROCHLORIDE 25 MG: 50 INJECTION, SOLUTION INTRAMUSCULAR; INTRAVENOUS at 04:01

## 2024-01-03 RX ADMIN — CARBOPLATIN 75 MG: 10 INJECTION, SOLUTION INTRAVENOUS at 02:01

## 2024-01-03 RX ADMIN — CARBOPLATIN 5 MG: 10 INJECTION, SOLUTION INTRAVENOUS at 01:01

## 2024-01-03 RX ADMIN — FAMOTIDINE 20 MG: 10 INJECTION, SOLUTION INTRAVENOUS at 12:01

## 2024-01-03 NOTE — DISCHARGE INSTRUCTIONS
.[facility]  [id number] [address]  [address]  [phone] phone     [phone] fax  Hours of Operation: Monday- Friday 8:00am- 5:00pm  After hours phone  [phone]  Hematology / Oncology Physicians on call    Dr. Varinder Salinas           Nurse Practitioners:     Lynn Rouse, ASHLEIGH Pineda, GATITO Poon, ASHLEIGH Orellana, ASHLEIGH Perrin, NP    Please don't hesitate to call if you have any concerns.      FALL PREVENTION   Falls often occur due to slipping, tripping or losing your balance. Here are ways to reduce your risk of falling again.   Was there anything that caused your fall that can be fixed, removed or replaced?   Make your home safe by keeping walkways clear of objects you may trip over.   Use non-slip pads under rugs.   Do not walk in poorly lit areas.   Do not stand on chairs or wobbly ladders.   Use caution when reaching overhead or looking upward. This position can cause a loss of balance.   Be sure your shoes fit properly, have non-slip bottoms and are in good condition.   Be cautious when going up and down stairs, curbs, and when walking on uneven sidewalks.   If your balance is poor, consider using a cane or walker.   If your fall was related to alcohol use, stop or limit alcohol intake.   If your fall was related to use of sleeping medicines, talk to your doctor about this. You may need to reduce your dosage at bedtime if you awaken during the night to go to the bathroom.   To reduce the need for nighttime bathroom trips:   Avoid drinking fluids for several hours before going to bed   Empty your bladder before going to bed   Men can keep a urinal at the bedside   © 3299-7803 [employer], [address] [place] [address], [place] [address]. All rights reserved. This information is not intended as a substitute for professional medical care. Always follow your healthcare  professional's instructions.    WAYS TO HELP PREVENT INFECTION        WASH YOUR HANDS OFTEN DURING THE DAY, ESPECIALLY BEFORE YOU EAT, AFTER USING THE BATHROOM, AND AFTER TOUCHING ANIMALS    STAY AWAY FROM PEOPLE WHO HAVE ILLNESSES YOU CAN CATCH; SUCH AS COLDS, FLU, CHICKEN POX    TRY TO AVOID CROWDS    STAY AWAY FROM CHILDREN WHO RECENTLY HAVE RECEIVED LIVE VIRUS VACCINES    MAINTAIN GOOD MOUTH CARE    DO NOT SQUEEZE OR SCRATCH PIMPLES    CLEAN CUTS & SCRAPES RIGHT AWAY AND DAILY UNTIL HEALED WITH WARM WATER, SOAP & AN ANTISEPTIC    AVOID CONTACT WITH LITTER BOXES, BIRD CAGES, & FISH TANKS    AVOID STANDING WATER, IE., BIRD BATHS, FLOWER POTS/VASES, OR HUMIDIFIERS    WEAR GLOVES WHEN GARDENING OR CLEANING UP AFTER OTHERS, ESPECIALLY BABIES & SMALL CHILDREN    DO NOT EAT RAW FISH, SEAFOOD, MEAT, OR EGGS

## 2024-01-04 NOTE — NURSING
"1622 Patient receiving last bag of carboplatin, snf completed, started c/o itching to bilateral hands and trunk. Paused infusion, opened saline, and notified Dr. House. As per orders in plan administered benadryl 25 mg IVP. Restarted infusion at 1631 as per Dr. House, patient finished at 1714 without any further reactions, itching subsided after administration of benadryl IVP. Instructed patient to ambulate,use the restroom, observed patient walking back to the chair. Patient's previous reaction occurred at the end of the carboplatin infusion while ambulating back chair from restroom. Patient successfully repeated her previous actions, reported feeling no symptoms, denied any dizziness, no diaphoresis, no flushing of face. Patient successfully completed her cycle with minimal reaction. Dr. House stated, "may add a slightly higher dose of steroid next cycle to prevent the itching" that the patient experienced today. Patient to return in 1 week for toxicity check and to see provider. Sent message to schedulers to schedule, patient is aware to check [facility] for next appointment.   "

## 2024-01-05 ENCOUNTER — PATIENT MESSAGE (OUTPATIENT)
Dept: INFUSION THERAPY | Facility: HOSPITAL | Age: 69
End: 2024-01-05
Payer: MEDICARE

## 2024-01-09 ENCOUNTER — LAB VISIT (OUTPATIENT)
Dept: LAB | Facility: HOSPITAL | Age: 69
End: 2024-01-09
Attending: INTERNAL MEDICINE
Payer: MEDICARE

## 2024-01-09 DIAGNOSIS — C56.3 OVARIAN CANCER, BILATERAL: ICD-10-CM

## 2024-01-09 DIAGNOSIS — Z90.722 S/P BSO (BILATERAL SALPINGO-OOPHORECTOMY): ICD-10-CM

## 2024-01-09 LAB
ALBUMIN SERPL BCP-MCNC: 3.7 G/DL (ref 3.5–5.2)
ALP SERPL-CCNC: 98 U/L (ref 55–135)
ALT SERPL W/O P-5'-P-CCNC: 30 U/L (ref 10–44)
ANION GAP SERPL CALC-SCNC: 13 MMOL/L (ref 8–16)
AST SERPL-CCNC: 15 U/L (ref 10–40)
BASOPHILS # BLD AUTO: 0.03 K/UL (ref 0–0.2)
BASOPHILS NFR BLD: 0.5 % (ref 0–1.9)
BILIRUB SERPL-MCNC: 0.5 MG/DL (ref 0.1–1)
BUN SERPL-MCNC: 24 MG/DL (ref 8–23)
CALCIUM SERPL-MCNC: 9.3 MG/DL (ref 8.7–10.5)
CHLORIDE SERPL-SCNC: 105 MMOL/L (ref 95–110)
CO2 SERPL-SCNC: 26 MMOL/L (ref 23–29)
CREAT SERPL-MCNC: 0.9 MG/DL (ref 0.5–1.4)
DIFFERENTIAL METHOD BLD: ABNORMAL
EOSINOPHIL # BLD AUTO: 0 K/UL (ref 0–0.5)
EOSINOPHIL NFR BLD: 0.2 % (ref 0–8)
ERYTHROCYTE [DISTWIDTH] IN BLOOD BY AUTOMATED COUNT: 15.8 % (ref 11.5–14.5)
EST. GFR  (NO RACE VARIABLE): >60 ML/MIN/1.73 M^2
GLUCOSE SERPL-MCNC: 126 MG/DL (ref 70–110)
HCT VFR BLD AUTO: 36.1 % (ref 37–48.5)
HGB BLD-MCNC: 11.2 G/DL (ref 12–16)
IMM GRANULOCYTES # BLD AUTO: 0.04 K/UL (ref 0–0.04)
IMM GRANULOCYTES NFR BLD AUTO: 0.7 % (ref 0–0.5)
LYMPHOCYTES # BLD AUTO: 1.5 K/UL (ref 1–4.8)
LYMPHOCYTES NFR BLD: 26.7 % (ref 18–48)
MAGNESIUM SERPL-MCNC: 1.7 MG/DL (ref 1.6–2.6)
MCH RBC QN AUTO: 28.9 PG (ref 27–31)
MCHC RBC AUTO-ENTMCNC: 31 G/DL (ref 32–36)
MCV RBC AUTO: 93 FL (ref 82–98)
MONOCYTES # BLD AUTO: 0.1 K/UL (ref 0.3–1)
MONOCYTES NFR BLD: 1.1 % (ref 4–15)
NEUTROPHILS # BLD AUTO: 3.9 K/UL (ref 1.8–7.7)
NEUTROPHILS NFR BLD: 70.8 % (ref 38–73)
NRBC BLD-RTO: 0 /100 WBC
PLATELET # BLD AUTO: 186 K/UL (ref 150–450)
PMV BLD AUTO: 10.4 FL (ref 9.2–12.9)
POTASSIUM SERPL-SCNC: 4 MMOL/L (ref 3.5–5.1)
PROT SERPL-MCNC: 6.4 G/DL (ref 6–8.4)
RBC # BLD AUTO: 3.87 M/UL (ref 4–5.4)
SODIUM SERPL-SCNC: 144 MMOL/L (ref 136–145)
WBC # BLD AUTO: 5.55 K/UL (ref 3.9–12.7)

## 2024-01-09 PROCEDURE — 80053 COMPREHEN METABOLIC PANEL: CPT | Performed by: INTERNAL MEDICINE

## 2024-01-09 PROCEDURE — 36415 COLL VENOUS BLD VENIPUNCTURE: CPT | Performed by: INTERNAL MEDICINE

## 2024-01-09 PROCEDURE — 83735 ASSAY OF MAGNESIUM: CPT | Performed by: INTERNAL MEDICINE

## 2024-01-09 PROCEDURE — 85025 COMPLETE CBC W/AUTO DIFF WBC: CPT | Performed by: INTERNAL MEDICINE

## 2024-01-09 PROCEDURE — 84100 ASSAY OF PHOSPHORUS: CPT | Performed by: INTERNAL MEDICINE

## 2024-01-10 ENCOUNTER — OFFICE VISIT (OUTPATIENT)
Dept: HEMATOLOGY/ONCOLOGY | Facility: CLINIC | Age: 69
End: 2024-01-10
Payer: MEDICARE

## 2024-01-10 DIAGNOSIS — C56.3 OVARIAN CANCER, BILATERAL: ICD-10-CM

## 2024-01-10 DIAGNOSIS — C78.6 PERITONEAL CARCINOMATOSIS: Primary | ICD-10-CM

## 2024-01-10 LAB — PHOSPHATE SERPL-MCNC: 3.7 MG/DL (ref 2.7–4.5)

## 2024-01-10 PROCEDURE — 99214 OFFICE O/P EST MOD 30 MIN: CPT | Mod: 95,,, | Performed by: INTERNAL MEDICINE

## 2024-01-10 NOTE — PROGRESS NOTES
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  - Right ureteral obstruction with indwelling ureteral stent     Previous Treatment:    - 02/2019 - 07/2019: Carboplatin, paclitaxel and Avastin x 6 cycles  - 08/15/2019:  salpingo-oophorectomy, ureterolysis/Omentectomy with complete pathologic response  - 10/2019 - 9/2020 Avastin maintenance     Current Treatment:    - Carboplatin and Paclitaxel (09/29/2023 -       The patient location is: Home  The chief complaint leading to consultation is: Tox Check post resumption of chemo    Visit type: audiovisual    Face to Face time with patient: 20 minutes  45 minutes of total time spent on the encounter, which includes face to face time and non-face to face time preparing to see the patient (eg, review of tests), Obtaining and/or reviewing separately obtained history, Documenting clinical information in the electronic or other health record, Independently interpreting results (not separately reported) and communicating results to the patient/family/caregiver, or Care coordination (not separately reported).         Each patient to whom he or she provides medical services by telemedicine is:  (1) informed of the relationship between the physician and patient and the respective role of any other health care provider with respect to management of the patient; and (2) notified that he or she may decline to receive medical services by telemedicine and may withdraw from such care at any time.    Notes:   Subjective   Casie Gaines is a pleasant 68 y.o. female who presents to clinic for follow up.    She underwent desentation with carboplatin and Taxol last week after a treatment break.  She tolerated  desensitization well.  The only thing she noticed after going home with a rash on her chest.  She has had some mild diarrhea and nausea but nothing uncontrollable.  Counseled on Imodium and antiemetic use.  We also discussed maintenance therapy.  I will reach out to her gynecologic oncologist as the recommendation for maintenance therapy per guidelines is not very strong however given her specific situation with early recurrence it should be strongly considered.  She expressed understanding.  I reassured her that we do have overall month to make this decision as she still has 2 cycles of chemotherapy left.    Review of Systems:  Review of Systems   Constitutional:  Negative for activity change, appetite change, chills, diaphoresis, fatigue, fever and unexpected weight change.   HENT:  Negative for nosebleeds.    Respiratory:  Negative for shortness of breath.    Cardiovascular:  Negative for chest pain.   Gastrointestinal:  Positive for diarrhea and nausea. Negative for abdominal distention, abdominal pain, anal bleeding, blood in stool, constipation and vomiting.   Genitourinary:  Negative for difficulty urinating and hematuria.   Musculoskeletal:  Negative for arthralgias, back pain and myalgias.   Skin:  Positive for rash (chest wall).   Neurological:  Negative for dizziness, weakness, light-headedness and headaches.   Hematological:  Does not bruise/bleed easily.   Psychiatric/Behavioral:  The patient is not nervous/anxious.      History     Oncology History   Peritoneal carcinomatosis   1/26/2019 Initial Diagnosis    Peritoneal carcinomatosis     2/15/2019 - 7/8/2019 Chemotherapy    Treatment Summary   Plan Name: OP GYN PACLITAXEL CARBOPLATIN (AUC 6) Q3W  Treatment Goal: Palliative  Status: Inactive  Start Date: 2/28/2019  End Date: 6/18/2019  Provider: Will Trevizo Jr., MD  Chemotherapy: bevacizumab (AVASTIN) 15 mg/kg = 1,600 mg in sodium chloride 0.9% 100 mL chemo infusion, 15 mg/kg = 1,600 mg (100 % of  original dose 15 mg/kg), Intravenous, Clinic/[facility] 1 time, 6 of 6 cycles  Dose modification: 15 mg/kg (original dose 15 mg/kg, Cycle 1)  Administration: 1,600 mg (2/28/2019), 1,600 mg (3/21/2019), 1,600 mg (4/11/2019), 1,600 mg (5/7/2019), 1,600 mg (5/28/2019), 1,510 mg (6/18/2019)  CARBOplatin (PARAPLATIN) 870 mg in sodium chloride 0.9% 337 mL chemo infusion, 870 mg (100 % of original dose 868.8 mg), Intravenous, Clinic/H[facility] 1 time, 6 of 6 cycles  Dose modification:   (original dose 868.8 mg, Cycle 1)  Administration: 870 mg (2/28/2019), 870 mg (3/21/2019), 900 mg (4/11/2019), 870 mg (5/7/2019), 660 mg (5/28/2019), 690 mg (6/18/2019)  PACLitaxel (TAXOL) 175 mg/m2 = 396 mg in sodium chloride 0.9% 566 mL chemo infusion, 175 mg/m2 = 396 mg, Intravenous, Clinic/H[facility] 1 time, 6 of 6 cycles  Dose modification: 140 mg/m2 (80 % of original dose 175 mg/m2, Cycle 5)  Administration: 396 mg (2/28/2019), 396 mg (3/21/2019), 396 mg (4/11/2019), 396 mg (5/7/2019), 318 mg (5/28/2019), 306 mg (6/18/2019)     10/9/2019 - 9/24/2020 Chemotherapy    Treatment Summary   Plan Name: OP BEVACIZUMAB Q3W   Treatment Goal: Maintenance  Status: Inactive  Start Date: 10/9/2019  End Date: 9/24/2020  Provider: Oscar Mckeon MD  Chemotherapy: dexAMETHasone tablet 8 mg, 8 mg (100 % of original dose 8 mg), Oral, Clinic/H[facility] 1 time, 2 of 8 cycles  Dose modification: 8 mg (original dose 8 mg, Cycle 8), 8 mg (original dose 8 mg, Cycle 12)  Administration: 8 mg (7/22/2020), 8 mg (8/13/2020)  bevacizumab (AVASTIN) 15 mg/kg = 1,615 mg in sodium chloride 0.9% 100 mL chemo infusion, 15 mg/kg = 1,615 mg, Intravenous, [facility] 1 time, 11 of 17 cycles  Administration: 1,615 mg (10/9/2019), 1,615 mg (10/29/2019), 1,615 mg (12/10/2019), 1,615 mg (1/21/2020), 1,600 mg (4/2/2020), 1,615 mg (4/23/2020), 1,600 mg (7/1/2020), 1,600 mg (7/22/2020), 1,600 mg (8/13/2020), 1,795 mg (9/3/2020), 1,795 mg (9/24/2020)     9/29/2023 -  Chemotherapy    Treatment Summary   Plan  Name: OP GYN PACLITAXEL CARBOPLATIN desensitization (AUC 5) Q3W  Treatment Goal: Control  Status: Active  Start Date: 9/29/2023  End Date: 10/2/2024 (Planned)  Provider: Ismael Juarez MD  Chemotherapy: CARBOplatin (PARAPLATIN) 650 mg in sodium chloride 0.9% 335 mL chemo infusion, 650 mg (100 % of original dose 648 mg), Intravenous, Clinic/HOD 1 time, 4 of 17 cycles  Dose modification:   (original dose 648 mg, Cycle 1),   (original dose 853.8 mg, Cycle 2),   (original dose 730 mg, Cycle 3),   (original dose 730 mg, Cycle 4), 5 mg (original dose 730 mg, Cycle 4), 7.3 mg (original dose 730 mg, Cycle 4), 5 mg (original dose 730 mg, Cycle 4, Reason: MD Discretion, Comment: desensitization), 73 mg (original dose 730 mg, Cycle 4), 720 mg (original dose 730 mg, Cycle 4, Reason: MD Discretion, Comment: desensitization), 648.97 mg (original dose 730 mg, Cycle 4, Reason: MD Discretion, Comment: desensitization)  Administration: 650 mg (9/29/2023), 730 mg (11/10/2023), 710 mg (10/20/2023), 5 mg (1/3/2024), 5 mg (1/3/2024), 75 mg (1/3/2024), 650 mg (1/3/2024)  PACLitaxeL (TAXOL) 175 mg/m2 = 402 mg in sodium chloride 0.9% 500 mL chemo infusion, 175 mg/m2 = 402 mg, Intravenous, Clinic/HOD 1 time, 4 of 17 cycles  Administration: 402 mg (9/29/2023), 402 mg (10/20/2023), 408 mg (11/10/2023), 408 mg (1/3/2024)     Malignant neoplasm of right ovary (Resolved)   2/15/2019 Initial Diagnosis    Malignant neoplasm of right ovary     2/15/2019 - 7/8/2019 Chemotherapy    Treatment Summary   Plan Name: OP GYN PACLITAXEL CARBOPLATIN (AUC 6) Q3W  Treatment Goal: Palliative  Status: Inactive  Start Date: 2/28/2019  End Date: 6/18/2019  Provider: Will Trevizo Jr., MD  Chemotherapy: bevacizumab (AVASTIN) 15 mg/kg = 1,600 mg in sodium chloride 0.9% 100 mL chemo infusion, 15 mg/kg = 1,600 mg (100 % of original dose 15 mg/kg), Intravenous, Clinic/[facility] 1 time, 6 of 6 cycles  Dose modification: 15 mg/kg (original dose 15 mg/kg, Cycle  1)  Administration: 1,600 mg (2/28/2019), 1,600 mg (3/21/2019), 1,600 mg (4/11/2019), 1,600 mg (5/7/2019), 1,600 mg (5/28/2019), 1,510 mg (6/18/2019)  CARBOplatin (PARAPLATIN) 870 mg in sodium chloride 0.9% 337 mL chemo infusion, 870 mg (100 % of original dose 868.8 mg), Intravenous, Clinic/HOD 1 time, 6 of 6 cycles  Dose modification:   (original dose 868.8 mg, Cycle 1)  Administration: 870 mg (2/28/2019), 870 mg (3/21/2019), 900 mg (4/11/2019), 870 mg (5/7/2019), 660 mg (5/28/2019), 690 mg (6/18/2019)  PACLitaxel (TAXOL) 175 mg/m2 = 396 mg in sodium chloride 0.9% 566 mL chemo infusion, 175 mg/m2 = 396 mg, Intravenous, Clinic/HOD 1 time, 6 of 6 cycles  Dose modification: 140 mg/m2 (80 % of original dose 175 mg/m2, Cycle 5)  Administration: 396 mg (2/28/2019), 396 mg (3/21/2019), 396 mg (4/11/2019), 396 mg (5/7/2019), 318 mg (5/28/2019), 306 mg (6/18/2019)     10/9/2019 - 9/24/2020 Chemotherapy    Treatment Summary   Plan Name: OP BEVACIZUMAB Q3W   Treatment Goal: Maintenance  Status: Inactive  Start Date: 10/9/2019  End Date: 9/24/2020  Provider: Oscar Mckeon MD  Chemotherapy: dexAMETHasone tablet 8 mg, 8 mg (100 % of original dose 8 mg), Oral, Clinic/H[facility] 1 time, 2 of 8 cycles  Dose modification: 8 mg (original dose 8 mg, Cycle 8), 8 mg (original dose 8 mg, Cycle 12)  Administration: 8 mg (7/22/2020), 8 mg (8/13/2020)  bevacizumab (AVASTIN) 15 mg/kg = 1,615 mg in sodium chloride 0.9% 100 mL chemo infusion, 15 mg/kg = 1,615 mg, Intravenous, Clinic/[facility] 1 time, 11 of 17 cycles  Administration: 1,615 mg (10/9/2019), 1,615 mg (10/29/2019), 1,615 mg (12/10/2019), 1,615 mg (1/21/2020), 1,600 mg (4/2/2020), 1,615 mg (4/23/2020), 1,600 mg (7/1/2020), 1,600 mg (7/22/2020), 1,600 mg (8/13/2020), 1,795 mg (9/3/2020), 1,795 mg (9/24/2020)     Malignant neoplasm of both ovaries (Resolved)   2/18/2019 Initial Diagnosis    Ovarian cancer     10/9/2019 - 9/24/2020 Chemotherapy    Treatment Summary   Plan Name: OP BEVACIZUMAB Q3W    Treatment Goal: Maintenance  Status: Inactive  Start Date: 10/9/2019  End Date: 9/24/2020  Provider: Oscar Mckeon MD  Chemotherapy: dexAMETHasone tablet 8 mg, 8 mg (100 % of original dose 8 mg), Oral, Clinic/[facility] 1 time, 2 of 8 cycles  Dose modification: 8 mg (original dose 8 mg, Cycle 8), 8 mg (original dose 8 mg, Cycle 12)  Administration: 8 mg (7/22/2020), 8 mg (8/13/2020)  bevacizumab (AVASTIN) 15 mg/kg = 1,615 mg in sodium chloride 0.9% 100 mL chemo infusion, 15 mg/kg = 1,615 mg, Intravenous, Clinic/HOD 1 time, 11 of 17 cycles  Administration: 1,615 mg (10/9/2019), 1,615 mg (10/29/2019), 1,615 mg (12/10/2019), 1,615 mg (1/21/2020), 1,600 mg (4/2/2020), 1,615 mg (4/23/2020), 1,600 mg (7/1/2020), 1,600 mg (7/22/2020), 1,600 mg (8/13/2020), 1,795 mg (9/3/2020), 1,795 mg (9/24/2020)     Ovarian cancer, bilateral   8/15/2019 Initial Diagnosis    Ovarian cancer, bilateral     10/9/2019 - 9/24/2020 Chemotherapy    Treatment Summary   Plan Name: OP BEVACIZUMAB Q3W   Treatment Goal: Maintenance  Status: Inactive  Start Date: 10/9/2019  End Date: 9/24/2020  Provider: Oscar Mckeon MD  Chemotherapy: dexAMETHasone tablet 8 mg, 8 mg (100 % of original dose 8 mg), Oral, Clinic/[facility] 1 time, 2 of 8 cycles  Dose modification: 8 mg (original dose 8 mg, Cycle 8), 8 mg (original dose 8 mg, Cycle 12)  Administration: 8 mg (7/22/2020), 8 mg (8/13/2020)  bevacizumab (AVASTIN) 15 mg/kg = 1,615 mg in sodium chloride 0.9% 100 mL chemo infusion, 15 mg/kg = 1,615 mg, Intravenous, Clinic/HOD 1 time, 11 of 17 cycles  Administration: 1,615 mg (10/9/2019), 1,615 mg (10/29/2019), 1,615 mg (12/10/2019), 1,615 mg (1/21/2020), 1,600 mg (4/2/2020), 1,615 mg (4/23/2020), 1,600 mg (7/1/2020), 1,600 mg (7/22/2020), 1,600 mg (8/13/2020), 1,795 mg (9/3/2020), 1,795 mg (9/24/2020)     9/29/2023 -  Chemotherapy    Treatment Summary   Plan Name: OP GYN PACLITAXEL CARBOPLATIN desensitization (AUC 5) Q3W  Treatment Goal: Control  Status: Active  Start  Date: 2023  End Date: 10/2/2024 (Planned)  Provider: Ismael Juarez MD  Chemotherapy: CARBOplatin (PARAPLATIN) 650 mg in sodium chloride 0.9% 335 mL chemo infusion, 650 mg (100 % of original dose 648 mg), Intravenous, Clinic/[facility] 1 time, 4 of 17 cycles  Dose modification:   (original dose 648 mg, Cycle 1),   (original dose 853.8 mg, Cycle 2),   (original dose 730 mg, Cycle 3),   (original dose 730 mg, Cycle 4), 5 mg (original dose 730 mg, Cycle 4), 7.3 mg (original dose 730 mg, Cycle 4), 5 mg (original dose 730 mg, Cycle 4, Reason: MD Discretion, Comment: desensitization), 73 mg (original dose 730 mg, Cycle 4), 720 mg (original dose 730 mg, Cycle 4, Reason: MD Discretion, Comment: desensitization), 648.97 mg (original dose 730 mg, Cycle 4, Reason: MD Discretion, Comment: desensitization)  Administration: 650 mg (2023), 730 mg (11/10/2023), 710 mg (10/20/2023), 5 mg (1/3/2024), 5 mg (1/3/2024), 75 mg (1/3/2024), 650 mg (1/3/2024)  PACLitaxeL (TAXOL) 175 mg/m2 = 402 mg in sodium chloride 0.9% 500 mL chemo infusion, 175 mg/m2 = 402 mg, Intravenous, Clinic/H[facility] 1 time, 4 of 17 cycles  Administration: 402 mg (2023), 402 mg (10/20/2023), 408 mg (11/10/2023), 408 mg (1/3/2024)           Past Medical History:   Diagnosis Date    Anemia     Anxiety     Arthritis     knees    Cancer     ovarian    CHF (congestive heart failure)     Hx antineoplastic chemotherapy     last 2019    Hyperlipemia     Hypertension     Neck pain     Ovarian cancer 2019    CHEMO    Peritoneal carcinomatosis 2019       Past Surgical History:   Procedure Laterality Date    breast reduction  10/02/2018    CATARACT EXTRACTION Bilateral     2023     SECTION      X 1    COLONOSCOPY N/A 2021    Procedure: COLONOSCOPY;  Surgeon: Paulette Rojas MD;  Location: [facility];  Service: Gastroenterology;  Laterality: N/A;    CYSTOSCOPY W/ URETERAL STENT PLACEMENT Right 2019    Procedure: CYSTOSCOPY, WITH URETERAL  STENT INSERTION;  Surgeon: Timmy Santiago IV, MD;  Location: [facility] OR;  Service: Urology;  Laterality: Right;    CYSTOSCOPY W/ URETERAL STENT REMOVAL  10/04/2019    DILATION AND CURETTAGE OF UTERUS      HYSTERECTOMY      RALH for fibroids (still has ovaries)    INSERTION OF VENOUS ACCESS PORT Left 2019    Procedure: INSERTION, VENOUS ACCESS PORT;  Surgeon: Ulisses Monzon MD;  Location: [facility] OR;  Service: General;  Laterality: Left;  Left internal jugular     LYSIS OF ADHESIONS OF URETER N/A 08/15/2019    Procedure: URETEROLYSIS;  Surgeon: Ismael Juarez MD;  Location: [facility] OR;  Service: OB/GYN;  Laterality: N/A;    OMENTECTOMY N/A 08/15/2019    Procedure: OMENTECTOMY;  Surgeon: Ismael Juarez MD;  Location: [facility];  Service: OB/GYN;  Laterality: N/A;    RETROGRADE PYELOGRAPHY Right 2019    Procedure: PYELOGRAM, RETROGRADE;  Surgeon: Timmy Santiago IV, MD;  Location: [facility] OR;  Service: Urology;  Laterality: Right;    ROBOT-ASSISTED LAPAROSCOPIC SALPINGO-OOPHORECTOMY USING DA TIA XI Bilateral 08/15/2019    Procedure: XI ROBOTIC SALPINGO-OOPHORECTOMY;  Surgeon: Ismael Juarez MD;  Location: [facility];  Service: OB/GYN;  Laterality: Bilateral;    TOTAL REDUCTION MAMMOPLASTY  2018    TUBAL LIGATION         Family History   Problem Relation Age of Onset    Glaucoma Mother     Colon cancer Brother     Breast cancer Maternal Aunt     Breast cancer Paternal Aunt     Ovarian cancer Paternal Aunt     Anesthesia problems Other     Thrombophilia Neg Hx        Review of patient's allergies indicates:  No Known Allergies    Social History     Tobacco Use    Smoking status: Former     Current packs/day: 0.00     Average packs/day: 1 pack/day for 25.0 years (25.0 ttl pk-yrs)     Types: Cigarettes     Start date: 10/1/1993     Quit date: 10/1/2018     Years since quittin.2    Smokeless tobacco: Never    Tobacco comments:     States started quit 2 months ago after 30 years   Substance Use Topics    Alcohol use:  Yes     Comment: occasionally  No alcohol 72h prior to sx    Drug use: No           Labs   Labs:  Lab Visit on 01/09/2024   Component Date Value Ref Range Status    Phosphorus 01/09/2024 3.7  2.7 - 4.5 mg/dL Final    Magnesium 01/09/2024 1.7  1.6 - 2.6 mg/dL Final    Sodium 01/09/2024 144  136 - 145 mmol/L Final    Potassium 01/09/2024 4.0  3.5 - 5.1 mmol/L Final    Chloride 01/09/2024 105  95 - 110 mmol/L Final    CO2 01/09/2024 26  23 - 29 mmol/L Final    Glucose 01/09/2024 126 (H)  70 - 110 mg/dL Final    BUN 01/09/2024 24 (H)  8 - 23 mg/dL Final    Creatinine 01/09/2024 0.9  0.5 - 1.4 mg/dL Final    Calcium 01/09/2024 9.3  8.7 - 10.5 mg/dL Final    Total Protein 01/09/2024 6.4  6.0 - 8.4 g/dL Final    Albumin 01/09/2024 3.7  3.5 - 5.2 g/dL Final    Total Bilirubin 01/09/2024 0.5  0.1 - 1.0 mg/dL Final    Comment: For infants and newborns, interpretation of results should be based  on gestational age, weight and in agreement with clinical  observations.    Premature Infant recommended reference ranges:  Up to 24 hours.............<8.0 mg/dL  Up to 48 hours............<12.0 mg/dL  3-5 days..................<15.0 mg/dL  6-29 days.................<15.0 mg/dL      Alkaline Phosphatase 01/09/2024 98  55 - 135 U/L Final    AST 01/09/2024 15  10 - 40 U/L Final    ALT 01/09/2024 30  10 - 44 U/L Final    eGFR 01/09/2024 >60  >60 mL/min/1.73 m^2 Final    Anion Gap 01/09/2024 13  8 - 16 mmol/L Final    WBC 01/09/2024 5.55  3.90 - 12.70 K/uL Final    RBC 01/09/2024 3.87 (L)  4.00 - 5.40 M/uL Final    Hemoglobin 01/09/2024 11.2 (L)  12.0 - 16.0 g/dL Final    Hematocrit 01/09/2024 36.1 (L)  37.0 - 48.5 % Final    MCV 01/09/2024 93  82 - 98 fL Final    MCH 01/09/2024 28.9  27.0 - 31.0 pg Final    MCHC 01/09/2024 31.0 (L)  32.0 - 36.0 g/dL Final    RDW 01/09/2024 15.8 (H)  11.5 - 14.5 % Final    Platelets 01/09/2024 186  150 - 450 K/uL Final    MPV 01/09/2024 10.4  9.2 - 12.9 fL Final    Immature Granulocytes 01/09/2024 0.7 (H)   0.0 - 0.5 % Final    Gran # (ANC) 01/09/2024 3.9  1.8 - 7.7 K/uL Final    Immature Grans (Abs) 01/09/2024 0.04  0.00 - 0.04 K/uL Final    Comment: Mild elevation in immature granulocytes is non specific and   can be seen in a variety of conditions including stress response,   acute inflammation, trauma and pregnancy. Correlation with other   laboratory and clinical findings is essential.      Lymph # 01/09/2024 1.5  1.0 - 4.8 K/uL Final    Mono # 01/09/2024 0.1 (L)  0.3 - 1.0 K/uL Final    Eos # 01/09/2024 0.0  0.0 - 0.5 K/uL Final    Baso # 01/09/2024 0.03  0.00 - 0.20 K/uL Final    nRBC 01/09/2024 0  0 /100 WBC Final    Gran % 01/09/2024 70.8  38.0 - 73.0 % Final    Lymph % 01/09/2024 26.7  18.0 - 48.0 % Final    Mono % 01/09/2024 1.1 (L)  4.0 - 15.0 % Final    Eosinophil % 01/09/2024 0.2  0.0 - 8.0 % Final    Basophil % 01/09/2024 0.5  0.0 - 1.9 % Final    Differential Method 01/09/2024 Automated   Final        Imaging   CT Chest Abdomen Pelvis With IV Contrast (XPD) NO Oral Contrast 12/12/2023  Order: [id number]  Status: Final result       Visible to patient: Yes (not seen)       Next appt: 01/03/2024 at 09:00 AM in Chemotherapy (CHA[facility])       Dx: Hypokalemia; Hypomagnesemia; Ovarian ...    0 Result Notes  Details    Reading Physician Reading Date Result Priority   May, Michele FLORES MD  [phone] 12/12/2023 STAT     Narrative & Impression  EXAMINATION:  CT CHEST ABDOMEN PELVIS WITH IV CONTRAST (XPD)     CLINICAL HISTORY:  Hypokalemia,follow up ovarian cancer on treatment; evaluate treatment response for subsequent treatment planning.     TECHNIQUE:  Axial CT imaging was performed through the chest, abdomen and pelvis with  100cc  of intravenous contrast. Multiplanar reformats were performed and interpreted.     COMPARISON:  08/21/2023, 09/29/2022 and 09/24/2020     FINDINGS:  Chest:     The heart, great vessels, and mediastinal structures are within normal limits. No thoracic adenopathy.  Left-sided  MediPort in the SVC.  Aortic atherosclerosis.     The lungs are clear bilaterally. No pleural effusions.     Abdomen:     The liver, spleen, kidneys, adrenal glands are within normal limits.  Stable 10 mm uncinate process pancreatic cyst.  No biliary ductal dilatation.     The gallbladder is unremarkable.     No free fluid, free air, or inflammatory change.     The small bowel is within normal limits.  Colonic diverticulosis.  Supraumbilical abdominal wall hernia containing a nondistended segment of the transverse colon.  No evidence of strangulation or incarceration.  The appendix is normal.     Aortic atherosclerosis without evidence of aneurysm.     Pelvis:     The urinary bladder is unremarkable. The uterus has been removed.  No free pelvic fluid or adenopathy.     No significant osseous abnormality is identified.  No suspicious osseous lesions.     Impression:     No CT evidence of recurrent or metastatic disease in the chest, abdomen or pelvis.     All CT scans at this facility are performed  using dose modulation techniques as appropriate to performed exam including the following:  automated exposure control; adjustment of mA and/or kV according to the patients size (this includes techniques or standardized protocols for targeted exams where dose is matched to indication/reason for exam: i.e. extremities or head);  iterative reconstruction technique.        Assessment and Plan   Recurrent, Stage IV Serous Ovarian Cancer with Peritoneal Carcinomatosis - 01/2019   Treated with neoadjuvant chemotherapy with carboplatin paclitaxel and Avastin (02/2019 - 07/2019), followed by surgical resection in August 2019.  Patient was then treated with Avastin maintenance  08/21/2023 CT CAP: Marked interval disease progression with severe peritoneal carcinomatosis as detailed above.  12/12/2023 CT CAP: No CT evidence of recurrent or metastatic disease in the chest, abdomen or pelvis.  : 7 >> 21 >> 32 >> 64 >>  11  Started Carbo/Taxol 09/29/2023  C1 no complications  C2 and C3 (11/10/23): reaction consisting of feeling presyncopal, SOB, sweating; vitals significant for hypoxia and hypotension; evaluated by AI and recs for desensitization  Per medical record, patient declined treatment until after the new year  CT CAP 12/12/23 showed CLYDE  C4 01/03/2023 with desensitization  Plan for C5 01/24/2024  Continue follow up with Gyn Onc  We will consider maintenance therapy      Chemotherapy Induced Neuropathy  Intermittent neuropathy in feet  Continue to monitor      Left Groin Pain  Only experiences when bladder full  Will obtain UA and ultrasound of the groin; she may need early gyn exam        Chronic Medical Conditions  Osteoarthritis  HTN  Anxiety  Hx CHF        Med Onc Chart Routing      Follow up with physician 2 weeks.   Follow up with [facility]    Infusion scheduling note    Injection scheduling note C5 Carbo/Taxol Desensitization 01/24/2024   Labs CMP, CBC, magnesium and phosphorus   Scheduling:  Preferred lab:  Lab interval:     Imaging    Pharmacy appointment    Other referrals                 The patient was seen, interviewed and examined. Pertinent lab and radiologic studies were reviewed. Pt instructed to call should they develop concerning signs/symptoms or have further questions.        Portions of the record may have been created with voice recognition software. Occasional wrong-word or sound-a-like substitutions may have occurred due to the inherent limitations of voice recognition software. Read the chart carefully and recognize, using context, where substitutions have occurred.      Torri Pugh MD    Hematology/Oncology

## 2024-01-23 ENCOUNTER — LAB VISIT (OUTPATIENT)
Dept: LAB | Facility: HOSPITAL | Age: 69
End: 2024-01-23
Attending: INTERNAL MEDICINE
Payer: MEDICARE

## 2024-01-23 DIAGNOSIS — C56.3 OVARIAN CANCER, BILATERAL: ICD-10-CM

## 2024-01-23 DIAGNOSIS — Z90.722 S/P BSO (BILATERAL SALPINGO-OOPHORECTOMY): ICD-10-CM

## 2024-01-23 LAB
ALBUMIN SERPL BCP-MCNC: 3.4 G/DL (ref 3.5–5.2)
ALP SERPL-CCNC: 117 U/L (ref 55–135)
ALT SERPL W/O P-5'-P-CCNC: 33 U/L (ref 10–44)
ANION GAP SERPL CALC-SCNC: 5 MMOL/L (ref 8–16)
AST SERPL-CCNC: 22 U/L (ref 10–40)
BASOPHILS # BLD AUTO: 0.02 K/UL (ref 0–0.2)
BASOPHILS NFR BLD: 0.3 % (ref 0–1.9)
BILIRUB SERPL-MCNC: 0.3 MG/DL (ref 0.1–1)
BUN SERPL-MCNC: 14 MG/DL (ref 8–23)
CALCIUM SERPL-MCNC: 9.8 MG/DL (ref 8.7–10.5)
CHLORIDE SERPL-SCNC: 110 MMOL/L (ref 95–110)
CO2 SERPL-SCNC: 29 MMOL/L (ref 23–29)
CREAT SERPL-MCNC: 1 MG/DL (ref 0.5–1.4)
DIFFERENTIAL METHOD BLD: ABNORMAL
EOSINOPHIL # BLD AUTO: 0 K/UL (ref 0–0.5)
EOSINOPHIL NFR BLD: 0.6 % (ref 0–8)
ERYTHROCYTE [DISTWIDTH] IN BLOOD BY AUTOMATED COUNT: 15.2 % (ref 11.5–14.5)
EST. GFR  (NO RACE VARIABLE): >60 ML/MIN/1.73 M^2
GLUCOSE SERPL-MCNC: 108 MG/DL (ref 70–110)
HCT VFR BLD AUTO: 33.9 % (ref 37–48.5)
HGB BLD-MCNC: 10.7 G/DL (ref 12–16)
IMM GRANULOCYTES # BLD AUTO: 0.03 K/UL (ref 0–0.04)
IMM GRANULOCYTES NFR BLD AUTO: 0.4 % (ref 0–0.5)
LYMPHOCYTES # BLD AUTO: 2.3 K/UL (ref 1–4.8)
LYMPHOCYTES NFR BLD: 33.6 % (ref 18–48)
MAGNESIUM SERPL-MCNC: 1.2 MG/DL (ref 1.6–2.6)
MCH RBC QN AUTO: 29 PG (ref 27–31)
MCHC RBC AUTO-ENTMCNC: 31.6 G/DL (ref 32–36)
MCV RBC AUTO: 92 FL (ref 82–98)
MONOCYTES # BLD AUTO: 0.4 K/UL (ref 0.3–1)
MONOCYTES NFR BLD: 6.5 % (ref 4–15)
NEUTROPHILS # BLD AUTO: 4 K/UL (ref 1.8–7.7)
NEUTROPHILS NFR BLD: 58.6 % (ref 38–73)
NRBC BLD-RTO: 0 /100 WBC
PHOSPHATE SERPL-MCNC: 3 MG/DL (ref 2.7–4.5)
PLATELET # BLD AUTO: 195 K/UL (ref 150–450)
PMV BLD AUTO: 9.8 FL (ref 9.2–12.9)
POTASSIUM SERPL-SCNC: 3.7 MMOL/L (ref 3.5–5.1)
PROT SERPL-MCNC: 6.9 G/DL (ref 6–8.4)
RBC # BLD AUTO: 3.69 M/UL (ref 4–5.4)
SODIUM SERPL-SCNC: 144 MMOL/L (ref 136–145)
WBC # BLD AUTO: 6.79 K/UL (ref 3.9–12.7)

## 2024-01-23 PROCEDURE — 36415 COLL VENOUS BLD VENIPUNCTURE: CPT | Performed by: INTERNAL MEDICINE

## 2024-01-23 PROCEDURE — 80053 COMPREHEN METABOLIC PANEL: CPT | Performed by: INTERNAL MEDICINE

## 2024-01-23 PROCEDURE — 85025 COMPLETE CBC W/AUTO DIFF WBC: CPT | Performed by: INTERNAL MEDICINE

## 2024-01-23 PROCEDURE — 83735 ASSAY OF MAGNESIUM: CPT | Performed by: INTERNAL MEDICINE

## 2024-01-23 PROCEDURE — 84100 ASSAY OF PHOSPHORUS: CPT | Performed by: INTERNAL MEDICINE

## 2024-01-24 ENCOUNTER — INFUSION (OUTPATIENT)
Dept: INFUSION THERAPY | Facility: HOSPITAL | Age: 69
End: 2024-01-24
Attending: INTERNAL MEDICINE
Payer: MEDICARE

## 2024-01-24 ENCOUNTER — OFFICE VISIT (OUTPATIENT)
Dept: HEMATOLOGY/ONCOLOGY | Facility: CLINIC | Age: 69
End: 2024-01-24
Payer: MEDICARE

## 2024-01-24 VITALS
RESPIRATION RATE: 18 BRPM | HEART RATE: 78 BPM | OXYGEN SATURATION: 97 % | BODY MASS INDEX: 39.79 KG/M2 | WEIGHT: 253.5 LBS | SYSTOLIC BLOOD PRESSURE: 134 MMHG | HEIGHT: 67 IN | TEMPERATURE: 98 F | DIASTOLIC BLOOD PRESSURE: 68 MMHG

## 2024-01-24 DIAGNOSIS — C78.6 PERITONEAL CARCINOMATOSIS: Primary | ICD-10-CM

## 2024-01-24 DIAGNOSIS — C56.3 OVARIAN CANCER, BILATERAL: ICD-10-CM

## 2024-01-24 PROCEDURE — 96415 CHEMO IV INFUSION ADDL HR: CPT

## 2024-01-24 PROCEDURE — 96413 CHEMO IV INFUSION 1 HR: CPT

## 2024-01-24 PROCEDURE — 63600175 PHARM REV CODE 636 W HCPCS: Performed by: INTERNAL MEDICINE

## 2024-01-24 PROCEDURE — 96367 TX/PROPH/DG ADDL SEQ IV INF: CPT

## 2024-01-24 PROCEDURE — 96375 TX/PRO/DX INJ NEW DRUG ADDON: CPT

## 2024-01-24 PROCEDURE — 25000003 PHARM REV CODE 250: Performed by: INTERNAL MEDICINE

## 2024-01-24 PROCEDURE — A4216 STERILE WATER/SALINE, 10 ML: HCPCS | Performed by: INTERNAL MEDICINE

## 2024-01-24 PROCEDURE — 96368 THER/DIAG CONCURRENT INF: CPT

## 2024-01-24 PROCEDURE — 36593 DECLOT VASCULAR DEVICE: CPT

## 2024-01-24 PROCEDURE — 96417 CHEMO IV INFUS EACH ADDL SEQ: CPT

## 2024-01-24 PROCEDURE — 99215 OFFICE O/P EST HI 40 MIN: CPT | Mod: 95,,, | Performed by: INTERNAL MEDICINE

## 2024-01-24 PROCEDURE — 96376 TX/PRO/DX INJ SAME DRUG ADON: CPT

## 2024-01-24 RX ORDER — DIPHENHYDRAMINE HYDROCHLORIDE 50 MG/ML
25 INJECTION INTRAMUSCULAR; INTRAVENOUS
Status: CANCELLED
Start: 2024-01-24

## 2024-01-24 RX ORDER — DIPHENHYDRAMINE HYDROCHLORIDE 50 MG/ML
50 INJECTION INTRAMUSCULAR; INTRAVENOUS
Status: DISCONTINUED | OUTPATIENT
Start: 2024-01-24 | End: 2024-01-24 | Stop reason: HOSPADM

## 2024-01-24 RX ORDER — PROCHLORPERAZINE EDISYLATE 5 MG/ML
5 INJECTION INTRAMUSCULAR; INTRAVENOUS ONCE AS NEEDED
Status: CANCELLED
Start: 2024-01-24

## 2024-01-24 RX ORDER — ALBUTEROL SULFATE 2.5 MG/.5ML
2.5 SOLUTION RESPIRATORY (INHALATION) EVERY 4 HOURS PRN
Status: CANCELLED
Start: 2024-01-24

## 2024-01-24 RX ORDER — SODIUM CHLORIDE 0.9 % (FLUSH) 0.9 %
10 SYRINGE (ML) INJECTION
Status: CANCELLED | OUTPATIENT
Start: 2024-01-24

## 2024-01-24 RX ORDER — FAMOTIDINE 10 MG/ML
20 INJECTION INTRAVENOUS
Status: CANCELLED
Start: 2024-01-24

## 2024-01-24 RX ORDER — EPINEPHRINE 0.3 MG/.3ML
0.3 INJECTION SUBCUTANEOUS ONCE AS NEEDED
Status: CANCELLED | OUTPATIENT
Start: 2024-01-24

## 2024-01-24 RX ORDER — PROCHLORPERAZINE EDISYLATE 5 MG/ML
5 INJECTION INTRAMUSCULAR; INTRAVENOUS ONCE AS NEEDED
Status: COMPLETED | OUTPATIENT
Start: 2024-01-24 | End: 2024-01-24

## 2024-01-24 RX ORDER — MAGNESIUM SULFATE HEPTAHYDRATE 40 MG/ML
2 INJECTION, SOLUTION INTRAVENOUS
Status: CANCELLED
Start: 2024-01-24

## 2024-01-24 RX ORDER — ALBUTEROL SULFATE 0.83 MG/ML
2.5 SOLUTION RESPIRATORY (INHALATION) EVERY 4 HOURS PRN
Status: DISCONTINUED | OUTPATIENT
Start: 2024-01-24 | End: 2024-01-24 | Stop reason: HOSPADM

## 2024-01-24 RX ORDER — HEPARIN 100 UNIT/ML
500 SYRINGE INTRAVENOUS
Status: CANCELLED | OUTPATIENT
Start: 2024-01-24

## 2024-01-24 RX ORDER — DIPHENHYDRAMINE HYDROCHLORIDE 50 MG/ML
50 INJECTION INTRAMUSCULAR; INTRAVENOUS EVERY 6 HOURS PRN
Status: CANCELLED | OUTPATIENT
Start: 2024-01-24

## 2024-01-24 RX ORDER — FAMOTIDINE 10 MG/ML
20 INJECTION INTRAVENOUS
Status: COMPLETED | OUTPATIENT
Start: 2024-01-24 | End: 2024-01-24

## 2024-01-24 RX ORDER — DIPHENHYDRAMINE HYDROCHLORIDE 50 MG/ML
50 INJECTION INTRAMUSCULAR; INTRAVENOUS
Status: CANCELLED | OUTPATIENT
Start: 2024-01-24

## 2024-01-24 RX ORDER — FAMOTIDINE 10 MG/ML
20 INJECTION INTRAVENOUS
Status: CANCELLED | OUTPATIENT
Start: 2024-01-24

## 2024-01-24 RX ORDER — DIPHENHYDRAMINE HYDROCHLORIDE 50 MG/ML
25 INJECTION INTRAMUSCULAR; INTRAVENOUS
Status: COMPLETED | OUTPATIENT
Start: 2024-01-24 | End: 2024-01-24

## 2024-01-24 RX ORDER — SODIUM CHLORIDE 0.9 % (FLUSH) 0.9 %
10 SYRINGE (ML) INJECTION
Status: DISCONTINUED | OUTPATIENT
Start: 2024-01-24 | End: 2024-01-24 | Stop reason: HOSPADM

## 2024-01-24 RX ORDER — HEPARIN 100 UNIT/ML
500 SYRINGE INTRAVENOUS
Status: DISCONTINUED | OUTPATIENT
Start: 2024-01-24 | End: 2024-01-24 | Stop reason: HOSPADM

## 2024-01-24 RX ORDER — DIPHENHYDRAMINE HYDROCHLORIDE 50 MG/ML
50 INJECTION INTRAMUSCULAR; INTRAVENOUS EVERY 6 HOURS PRN
Status: DISCONTINUED | OUTPATIENT
Start: 2024-01-24 | End: 2024-01-24 | Stop reason: HOSPADM

## 2024-01-24 RX ORDER — FAMOTIDINE 10 MG/ML
20 INJECTION INTRAVENOUS
Status: DISCONTINUED | OUTPATIENT
Start: 2024-01-24 | End: 2024-01-24 | Stop reason: HOSPADM

## 2024-01-24 RX ORDER — EPINEPHRINE 0.3 MG/.3ML
0.3 INJECTION SUBCUTANEOUS ONCE AS NEEDED
Status: DISCONTINUED | OUTPATIENT
Start: 2024-01-24 | End: 2024-01-24 | Stop reason: HOSPADM

## 2024-01-24 RX ADMIN — DEXAMETHASONE SODIUM PHOSPHATE 0.25 MG: 4 INJECTION, SOLUTION INTRA-ARTICULAR; INTRALESIONAL; INTRAMUSCULAR; INTRAVENOUS; SOFT TISSUE at 09:01

## 2024-01-24 RX ADMIN — MAGNESIUM SULFATE HEPTAHYDRATE 2 G: 500 INJECTION, SOLUTION INTRAMUSCULAR; INTRAVENOUS at 10:01

## 2024-01-24 RX ADMIN — DIPHENHYDRAMINE HYDROCHLORIDE 25 MG: 50 INJECTION, SOLUTION INTRAMUSCULAR; INTRAVENOUS at 01:01

## 2024-01-24 RX ADMIN — CARBOPLATIN 60 MG: 10 INJECTION, SOLUTION INTRAVENOUS at 02:01

## 2024-01-24 RX ADMIN — HEPARIN 500 UNITS: 100 SYRINGE at 05:01

## 2024-01-24 RX ADMIN — DIPHENHYDRAMINE HYDROCHLORIDE 50 MG: 50 INJECTION, SOLUTION INTRAMUSCULAR; INTRAVENOUS at 03:01

## 2024-01-24 RX ADMIN — DEXAMETHASONE SODIUM PHOSPHATE 10 MG: 4 INJECTION, SOLUTION INTRA-ARTICULAR; INTRALESIONAL; INTRAMUSCULAR; INTRAVENOUS; SOFT TISSUE at 01:01

## 2024-01-24 RX ADMIN — CARBOPLATIN 5 MG: 10 INJECTION, SOLUTION INTRAVENOUS at 01:01

## 2024-01-24 RX ADMIN — ALTEPLASE 2 MG: 2.2 INJECTION, POWDER, LYOPHILIZED, FOR SOLUTION INTRAVENOUS at 08:01

## 2024-01-24 RX ADMIN — DIPHENHYDRAMINE HYDROCHLORIDE 50 MG: 50 INJECTION, SOLUTION INTRAMUSCULAR; INTRAVENOUS at 09:01

## 2024-01-24 RX ADMIN — PACLITAXEL 408 MG: 6 INJECTION, SOLUTION INTRAVENOUS at 09:01

## 2024-01-24 RX ADMIN — FAMOTIDINE 20 MG: 10 INJECTION, SOLUTION INTRAVENOUS at 01:01

## 2024-01-24 RX ADMIN — CARBOPLATIN 5 MG: 10 INJECTION, SOLUTION INTRAVENOUS at 02:01

## 2024-01-24 RX ADMIN — Medication 10 ML: at 05:01

## 2024-01-24 RX ADMIN — APREPITANT 130 MG: 130 INJECTION, EMULSION INTRAVENOUS at 09:01

## 2024-01-24 RX ADMIN — FAMOTIDINE 20 MG: 10 INJECTION, SOLUTION INTRAVENOUS at 09:01

## 2024-01-24 RX ADMIN — CARBOPLATIN 605 MG: 10 INJECTION, SOLUTION INTRAVENOUS at 04:01

## 2024-01-24 RX ADMIN — PROCHLORPERAZINE EDISYLATE 5 MG: 5 INJECTION INTRAMUSCULAR; INTRAVENOUS at 02:01

## 2024-01-24 NOTE — PROGRESS NOTES
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  - Right ureteral obstruction with indwelling ureteral stent     Previous Treatment:    - 02/2019 - 07/2019: Carboplatin, paclitaxel and Avastin x 6 cycles  - 08/15/2019:  salpingo-oophorectomy, ureterolysis/Omentectomy with complete pathologic response  - 10/2019 - 9/2020 Avastin maintenance     Current Treatment:    - Carboplatin and Paclitaxel (09/29/2023 -       The patient location is: Home  The chief complaint leading to consultation is: Tox Check post resumption of chemo    Visit type: audiovisual    Face to Face time with patient: 20 minutes  45 minutes of total time spent on the encounter, which includes face to face time and non-face to face time preparing to see the patient (eg, review of tests), Obtaining and/or reviewing separately obtained history, Documenting clinical information in the electronic or other health record, Independently interpreting results (not separately reported) and communicating results to the patient/family/caregiver, or Care coordination (not separately reported).         Each patient to whom he or she provides medical services by telemedicine is:  (1) informed of the relationship between the physician and patient and the respective role of any other health care provider with respect to management of the patient; and (2) notified that he or she may decline to receive medical services by telemedicine and may withdraw from such care at any time.    Notes:   Subjective   Casie Gaines is a pleasant 68 y.o. female who presents to clinic for follow up.    She underwent desentation with carboplatin and Taxol l with her last cycle.  She is here for repeat again.  The rash is improving. She is not having any acute complaints.  With the last cycle extra Benadryl have to be administered due to some  tingling in her hands.  This resolved and we were able to resume.    Review of Systems:  Review of Systems   Constitutional:  Negative for activity change, appetite change, chills, diaphoresis, fatigue, fever and unexpected weight change.   HENT:  Negative for nosebleeds.    Respiratory:  Negative for shortness of breath.    Cardiovascular:  Negative for chest pain.   Gastrointestinal:  Positive for diarrhea and nausea. Negative for abdominal distention, abdominal pain, anal bleeding, blood in stool, constipation and vomiting.   Genitourinary:  Negative for difficulty urinating and hematuria.   Musculoskeletal:  Negative for arthralgias, back pain and myalgias.   Skin:  Positive for rash (chest wall).   Neurological:  Negative for dizziness, weakness, light-headedness and headaches.   Hematological:  Does not bruise/bleed easily.   Psychiatric/Behavioral:  The patient is not nervous/anxious.      History     Oncology History   Peritoneal carcinomatosis   1/26/2019 Initial Diagnosis    Peritoneal carcinomatosis     2/15/2019 - 7/8/2019 Chemotherapy    Treatment Summary   Plan Name: OP GYN PACLITAXEL CARBOPLATIN (AUC 6) Q3W  Treatment Goal: Palliative  Status: Inactive  Start Date: 2/28/2019  End Date: 6/18/2019  Provider: Will Trevizo Jr., MD  Chemotherapy: bevacizumab (AVASTIN) 15 mg/kg = 1,600 mg in sodium chloride 0.9% 100 mL chemo infusion, 15 mg/kg = 1,600 mg (100 % of original dose 15 mg/kg), Intravenous, Clinic/[facility] 1 time, 6 of 6 cycles  Dose modification: 15 mg/kg (original dose 15 mg/kg, Cycle 1)  Administration: 1,600 mg (2/28/2019), 1,600 mg (3/21/2019), 1,600 mg (4/11/2019), 1,600 mg (5/7/2019), 1,600 mg (5/28/2019), 1,510 mg (6/18/2019)  CARBOplatin (PARAPLATIN) 870 mg in sodium chloride 0.9% 337 mL chemo infusion, 870 mg (100 % of original dose 868.8 mg), Intravenous, Clinic/[facility] 1 time, 6 of 6 cycles  Dose modification:   (original dose 868.8 mg, Cycle 1)  Administration: 870 mg (2/28/2019), 870 mg  (3/21/2019), 900 mg (4/11/2019), 870 mg (5/7/2019), 660 mg (5/28/2019), 690 mg (6/18/2019)  PACLitaxel (TAXOL) 175 mg/m2 = 396 mg in sodium chloride 0.9% 566 mL chemo infusion, 175 mg/m2 = 396 mg, Intravenous, Clinic/[facility] 1 time, 6 of 6 cycles  Dose modification: 140 mg/m2 (80 % of original dose 175 mg/m2, Cycle 5)  Administration: 396 mg (2/28/2019), 396 mg (3/21/2019), 396 mg (4/11/2019), 396 mg (5/7/2019), 318 mg (5/28/2019), 306 mg (6/18/2019)     10/9/2019 - 9/24/2020 Chemotherapy    Treatment Summary   Plan Name: OP BEVACIZUMAB Q3W   Treatment Goal: Maintenance  Status: Inactive  Start Date: 10/9/2019  End Date: 9/24/2020  Provider: Oscar Mckeon MD  Chemotherapy: dexAMETHasone tablet 8 mg, 8 mg (100 % of original dose 8 mg), Oral, Clinic/[facility] 1 time, 2 of 8 cycles  Dose modification: 8 mg (original dose 8 mg, Cycle 8), 8 mg (original dose 8 mg, Cycle 12)  Administration: 8 mg (7/22/2020), 8 mg (8/13/2020)  bevacizumab (AVASTIN) 15 mg/kg = 1,615 mg in sodium chloride 0.9% 100 mL chemo infusion, 15 mg/kg = 1,615 mg, Intravenous, Clinic/[facility] 1 time, 11 of 17 cycles  Administration: 1,615 mg (10/9/2019), 1,615 mg (10/29/2019), 1,615 mg (12/10/2019), 1,615 mg (1/21/2020), 1,600 mg (4/2/2020), 1,615 mg (4/23/2020), 1,600 mg (7/1/2020), 1,600 mg (7/22/2020), 1,600 mg (8/13/2020), 1,795 mg (9/3/2020), 1,795 mg (9/24/2020)     9/29/2023 -  Chemotherapy    Treatment Summary   Plan Name: OP GYN PACLITAXEL CARBOPLATIN desensitization (AUC 5) Q3W  Treatment Goal: Control  Status: Active  Start Date: 9/29/2023  End Date: 10/2/2024 (Planned)  Provider: Ismeal Juarez MD  Chemotherapy: CARBOplatin (PARAPLATIN) 650 mg in sodium chloride 0.9% 335 mL chemo infusion, 650 mg (100 % of original dose 648 mg), Intravenous, Clinic/[facility] 1 time, 5 of 17 cycles  Dose modification:   (original dose 648 mg, Cycle 1),   (original dose 853.8 mg, Cycle 2),   (original dose 730 mg, Cycle 3),   (original dose 730 mg, Cycle 4), 5 mg (original dose  730 mg, Cycle 4), 7.3 mg (original dose 730 mg, Cycle 4), 5 mg (original dose 730 mg, Cycle 4, Reason: MD Discretion, Comment: desensitization), 73 mg (original dose 730 mg, Cycle 4), 720 mg (original dose 730 mg, Cycle 4, Reason: MD Discretion, Comment: desensitization), 648.97 mg (original dose 730 mg, Cycle 4, Reason: MD Discretion, Comment: desensitization), 0.61 mg (original dose 730 mg, Cycle 5, Reason: MD Discretion, Comment: desensitization), 5 mg (original dose 730 mg, Cycle 5, Reason: MD Discretion, Comment: desens), 60.5 mg (original dose 730 mg, Cycle 5, Reason: MD Discretion, Comment: desensitization),   (original dose 730 mg, Cycle 5, Reason: MD Discretion, Comment: new scr), 6.05 mg (original dose 730 mg, Cycle 5, Reason: Dose not tolerated, Comment: desensitization)  Administration: 650 mg (9/29/2023), 730 mg (11/10/2023), 710 mg (10/20/2023), 5 mg (1/3/2024), 5 mg (1/3/2024), 75 mg (1/3/2024), 650 mg (1/3/2024), 5 mg (1/24/2024), 60 mg (1/24/2024), 605 mg (1/24/2024), 5 mg (1/24/2024)  PACLitaxeL (TAXOL) 175 mg/m2 = 402 mg in sodium chloride 0.9% 500 mL chemo infusion, 175 mg/m2 = 402 mg, Intravenous, Clinic/[facility] 1 time, 5 of 17 cycles  Administration: 402 mg (9/29/2023), 402 mg (10/20/2023), 408 mg (11/10/2023), 408 mg (1/3/2024), 408 mg (1/24/2024)     Malignant neoplasm of right ovary (Resolved)   2/15/2019 Initial Diagnosis    Malignant neoplasm of right ovary     2/15/2019 - 7/8/2019 Chemotherapy    Treatment Summary   Plan Name: OP GYN PACLITAXEL CARBOPLATIN (AUC 6) Q3W  Treatment Goal: Palliative  Status: Inactive  Start Date: 2/28/2019  End Date: 6/18/2019  Provider: Will Trevizo Jr., MD  Chemotherapy: bevacizumab (AVASTIN) 15 mg/kg = 1,600 mg in sodium chloride 0.9% 100 mL chemo infusion, 15 mg/kg = 1,600 mg (100 % of original dose 15 mg/kg), Intravenous, Clinic/[facility] 1 time, 6 of 6 cycles  Dose modification: 15 mg/kg (original dose 15 mg/kg, Cycle 1)  Administration: 1,600 mg (2/28/2019),  1,600 mg (3/21/2019), 1,600 mg (4/11/2019), 1,600 mg (5/7/2019), 1,600 mg (5/28/2019), 1,510 mg (6/18/2019)  CARBOplatin (PARAPLATIN) 870 mg in sodium chloride 0.9% 337 mL chemo infusion, 870 mg (100 % of original dose 868.8 mg), Intravenous, Clinic/[facility] 1 time, 6 of 6 cycles  Dose modification:   (original dose 868.8 mg, Cycle 1)  Administration: 870 mg (2/28/2019), 870 mg (3/21/2019), 900 mg (4/11/2019), 870 mg (5/7/2019), 660 mg (5/28/2019), 690 mg (6/18/2019)  PACLitaxel (TAXOL) 175 mg/m2 = 396 mg in sodium chloride 0.9% 566 mL chemo infusion, 175 mg/m2 = 396 mg, Intravenous, Clinic/[facility] 1 time, 6 of 6 cycles  Dose modification: 140 mg/m2 (80 % of original dose 175 mg/m2, Cycle 5)  Administration: 396 mg (2/28/2019), 396 mg (3/21/2019), 396 mg (4/11/2019), 396 mg (5/7/2019), 318 mg (5/28/2019), 306 mg (6/18/2019)     10/9/2019 - 9/24/2020 Chemotherapy    Treatment Summary   Plan Name: OP BEVACIZUMAB Q3W   Treatment Goal: Maintenance  Status: Inactive  Start Date: 10/9/2019  End Date: 9/24/2020  Provider: Oscar Mckeon MD  Chemotherapy: dexAMETHasone tablet 8 mg, 8 mg (100 % of original dose 8 mg), Oral, Clinic/[facility] 1 time, 2 of 8 cycles  Dose modification: 8 mg (original dose 8 mg, Cycle 8), 8 mg (original dose 8 mg, Cycle 12)  Administration: 8 mg (7/22/2020), 8 mg (8/13/2020)  bevacizumab (AVASTIN) 15 mg/kg = 1,615 mg in sodium chloride 0.9% 100 mL chemo infusion, 15 mg/kg = 1,615 mg, Intravenous, Clinic/[facility] 1 time, 11 of 17 cycles  Administration: 1,615 mg (10/9/2019), 1,615 mg (10/29/2019), 1,615 mg (12/10/2019), 1,615 mg (1/21/2020), 1,600 mg (4/2/2020), 1,615 mg (4/23/2020), 1,600 mg (7/1/2020), 1,600 mg (7/22/2020), 1,600 mg (8/13/2020), 1,795 mg (9/3/2020), 1,795 mg (9/24/2020)     Malignant neoplasm of both ovaries (Resolved)   2/18/2019 Initial Diagnosis    Ovarian cancer     10/9/2019 - 9/24/2020 Chemotherapy    Treatment Summary   Plan Name: OP BEVACIZUMAB Q3W   Treatment Goal: Maintenance  Status:  Inactive  Start Date: 10/9/2019  End Date: 9/24/2020  Provider: Oscar Mckeon MD  Chemotherapy: dexAMETHasone tablet 8 mg, 8 mg (100 % of original dose 8 mg), Oral, Clinic/[facility] 1 time, 2 of 8 cycles  Dose modification: 8 mg (original dose 8 mg, Cycle 8), 8 mg (original dose 8 mg, Cycle 12)  Administration: 8 mg (7/22/2020), 8 mg (8/13/2020)  bevacizumab (AVASTIN) 15 mg/kg = 1,615 mg in sodium chloride 0.9% 100 mL chemo infusion, 15 mg/kg = 1,615 mg, Intravenous, Clinic/[facility] 1 time, 11 of 17 cycles  Administration: 1,615 mg (10/9/2019), 1,615 mg (10/29/2019), 1,615 mg (12/10/2019), 1,615 mg (1/21/2020), 1,600 mg (4/2/2020), 1,615 mg (4/23/2020), 1,600 mg (7/1/2020), 1,600 mg (7/22/2020), 1,600 mg (8/13/2020), 1,795 mg (9/3/2020), 1,795 mg (9/24/2020)     Ovarian cancer, bilateral   8/15/2019 Initial Diagnosis    Ovarian cancer, bilateral     10/9/2019 - 9/24/2020 Chemotherapy    Treatment Summary   Plan Name: OP BEVACIZUMAB Q3W   Treatment Goal: Maintenance  Status: Inactive  Start Date: 10/9/2019  End Date: 9/24/2020  Provider: Oscar Mckeon MD  Chemotherapy: dexAMETHasone tablet 8 mg, 8 mg (100 % of original dose 8 mg), Oral, Clinic/[facility] 1 time, 2 of 8 cycles  Dose modification: 8 mg (original dose 8 mg, Cycle 8), 8 mg (original dose 8 mg, Cycle 12)  Administration: 8 mg (7/22/2020), 8 mg (8/13/2020)  bevacizumab (AVASTIN) 15 mg/kg = 1,615 mg in sodium chloride 0.9% 100 mL chemo infusion, 15 mg/kg = 1,615 mg, Intravenous, Clinic/[facility] 1 time, 11 of 17 cycles  Administration: 1,615 mg (10/9/2019), 1,615 mg (10/29/2019), 1,615 mg (12/10/2019), 1,615 mg (1/21/2020), 1,600 mg (4/2/2020), 1,615 mg (4/23/2020), 1,600 mg (7/1/2020), 1,600 mg (7/22/2020), 1,600 mg (8/13/2020), 1,795 mg (9/3/2020), 1,795 mg (9/24/2020)     9/29/2023 -  Chemotherapy    Treatment Summary   Plan Name: OP GYN PACLITAXEL CARBOPLATIN desensitization (AUC 5) Q3W  Treatment Goal: Control  Status: Active  Start Date: 9/29/2023  End Date: 10/2/2024  (Planned)  Provider: Ismael Juarez MD  Chemotherapy: CARBOplatin (PARAPLATIN) 650 mg in sodium chloride 0.9% 335 mL chemo infusion, 650 mg (100 % of original dose 648 mg), Intravenous, Clinic/[facility] 1 time, 5 of 17 cycles  Dose modification:   (original dose 648 mg, Cycle 1),   (original dose 853.8 mg, Cycle 2),   (original dose 730 mg, Cycle 3),   (original dose 730 mg, Cycle 4), 5 mg (original dose 730 mg, Cycle 4), 7.3 mg (original dose 730 mg, Cycle 4), 5 mg (original dose 730 mg, Cycle 4, Reason: MD Discretion, Comment: desensitization), 73 mg (original dose 730 mg, Cycle 4), 720 mg (original dose 730 mg, Cycle 4, Reason: MD Discretion, Comment: desensitization), 648.97 mg (original dose 730 mg, Cycle 4, Reason: MD Discretion, Comment: desensitization), 0.61 mg (original dose 730 mg, Cycle 5, Reason: MD Discretion, Comment: desensitization), 5 mg (original dose 730 mg, Cycle 5, Reason: MD Discretion, Comment: desens), 60.5 mg (original dose 730 mg, Cycle 5, Reason: MD Discretion, Comment: desensitization),   (original dose 730 mg, Cycle 5, Reason: MD Discretion, Comment: new scr), 6.05 mg (original dose 730 mg, Cycle 5, Reason: Dose not tolerated, Comment: desensitization)  Administration: 650 mg (9/29/2023), 730 mg (11/10/2023), 710 mg (10/20/2023), 5 mg (1/3/2024), 5 mg (1/3/2024), 75 mg (1/3/2024), 650 mg (1/3/2024), 5 mg (1/24/2024), 60 mg (1/24/2024), 605 mg (1/24/2024), 5 mg (1/24/2024)  PACLitaxeL (TAXOL) 175 mg/m2 = 402 mg in sodium chloride 0.9% 500 mL chemo infusion, 175 mg/m2 = 402 mg, Intravenous, Clinic/[facility] 1 time, 5 of 17 cycles  Administration: 402 mg (9/29/2023), 402 mg (10/20/2023), 408 mg (11/10/2023), 408 mg (1/3/2024), 408 mg (1/24/2024)           Past Medical History:   Diagnosis Date    Anemia     Anxiety     Arthritis     knees    Cancer     ovarian    CHF (congestive heart failure)     Hx antineoplastic chemotherapy     last 6/2019    Hyperlipemia     Hypertension     Neck pain     Ovarian  cancer 2019    CHEMO    Peritoneal carcinomatosis 2019       Past Surgical History:   Procedure Laterality Date    breast reduction  10/02/2018    CATARACT EXTRACTION Bilateral     2023     SECTION      X 1    COLONOSCOPY N/A 2021    Procedure: COLONOSCOPY;  Surgeon: Paulette Rojas MD;  Location: [facility];  Service: Gastroenterology;  Laterality: N/A;    CYSTOSCOPY W/ URETERAL STENT PLACEMENT Right 2019    Procedure: CYSTOSCOPY, WITH URETERAL STENT INSERTION;  Surgeon: Timmy Santiago IV, MD;  Location: [facility];  Service: Urology;  Laterality: Right;    CYSTOSCOPY W/ URETERAL STENT REMOVAL  10/04/2019    DILATION AND CURETTAGE OF UTERUS      HYSTERECTOMY      RALH for fibroids (still has ovaries)    INSERTION OF VENOUS ACCESS PORT Left 2019    Procedure: INSERTION, VENOUS ACCESS PORT;  Surgeon: Ulisses Monzon MD;  Location: [facility];  Service: General;  Laterality: Left;  Left internal jugular     LYSIS OF ADHESIONS OF URETER N/A 08/15/2019    Procedure: URETEROLYSIS;  Surgeon: Ismael Juarez MD;  Location: [facility] OR;  Service: OB/GYN;  Laterality: N/A;    OMENTECTOMY N/A 08/15/2019    Procedure: OMENTECTOMY;  Surgeon: Ismael Juarez MD;  Location: [facility] OR;  Service: OB/GYN;  Laterality: N/A;    RETROGRADE PYELOGRAPHY Right 2019    Procedure: PYELOGRAM, RETROGRADE;  Surgeon: Timmy Santiago IV, MD;  Location: [facility];  Service: Urology;  Laterality: Right;    ROBOT-ASSISTED LAPAROSCOPIC SALPINGO-OOPHORECTOMY USING DA TIA XI Bilateral 08/15/2019    Procedure: XI ROBOTIC SALPINGO-OOPHORECTOMY;  Surgeon: Ismael Juarez MD;  Location: [facility] OR;  Service: OB/GYN;  Laterality: Bilateral;    TOTAL REDUCTION MAMMOPLASTY  2018    TUBAL LIGATION         Family History   Problem Relation Age of Onset    Glaucoma Mother     Colon cancer Brother     Breast cancer Maternal Aunt     Breast cancer Paternal Aunt     Ovarian cancer Paternal Aunt     Anesthesia problems Other      Thrombophilia Neg Hx        Review of patient's allergies indicates:  No Known Allergies    Social History     Tobacco Use    Smoking status: Former     Current packs/day: 0.00     Average packs/day: 1 pack/day for 25.0 years (25.0 ttl pk-yrs)     Types: Cigarettes     Start date: 10/1/1993     Quit date: 10/1/2018     Years since quittin.3    Smokeless tobacco: Never    Tobacco comments:     States started quit 2 months ago after 30 years   Substance Use Topics    Alcohol use: Yes     Comment: occasionally  No alcohol 72h prior to sx    Drug use: No           Labs   Labs:  Lab Visit on 2024   Component Date Value Ref Range Status    Phosphorus 2024 3.0  2.7 - 4.5 mg/dL Final    Magnesium 2024 1.2 (L)  1.6 - 2.6 mg/dL Final    Sodium 2024 144  136 - 145 mmol/L Final    Potassium 2024 3.7  3.5 - 5.1 mmol/L Final    Chloride 2024 110  95 - 110 mmol/L Final    CO2 2024 29  23 - 29 mmol/L Final    Glucose 2024 108  70 - 110 mg/dL Final    BUN 2024 14  8 - 23 mg/dL Final    Creatinine 2024 1.0  0.5 - 1.4 mg/dL Final    Calcium 2024 9.8  8.7 - 10.5 mg/dL Final    Total Protein 2024 6.9  6.0 - 8.4 g/dL Final    Albumin 2024 3.4 (L)  3.5 - 5.2 g/dL Final    Total Bilirubin 2024 0.3  0.1 - 1.0 mg/dL Final    Comment: For infants and newborns, interpretation of results should be based  on gestational age, weight and in agreement with clinical  observations.    Premature Infant recommended reference ranges:  Up to 24 hours.............<8.0 mg/dL  Up to 48 hours............<12.0 mg/dL  3-5 days..................<15.0 mg/dL  6-29 days.................<15.0 mg/dL      Alkaline Phosphatase 2024 117  55 - 135 U/L Final    AST 2024 22  10 - 40 U/L Final    ALT 2024 33  10 - 44 U/L Final    eGFR 2024 >60  >60 mL/min/1.73 m^2 Final    Anion Gap 2024 5 (L)  8 - 16 mmol/L Final    WBC 2024 6.79  3.90 - 12.70 K/uL  Final    RBC 01/23/2024 3.69 (L)  4.00 - 5.40 M/uL Final    Hemoglobin 01/23/2024 10.7 (L)  12.0 - 16.0 g/dL Final    Hematocrit 01/23/2024 33.9 (L)  37.0 - 48.5 % Final    MCV 01/23/2024 92  82 - 98 fL Final    MCH 01/23/2024 29.0  27.0 - 31.0 pg Final    MCHC 01/23/2024 31.6 (L)  32.0 - 36.0 g/dL Final    RDW 01/23/2024 15.2 (H)  11.5 - 14.5 % Final    Platelets 01/23/2024 195  150 - 450 K/uL Final    MPV 01/23/2024 9.8  9.2 - 12.9 fL Final    Immature Granulocytes 01/23/2024 0.4  0.0 - 0.5 % Final    Gran # (ANC) 01/23/2024 4.0  1.8 - 7.7 K/uL Final    Immature Grans (Abs) 01/23/2024 0.03  0.00 - 0.04 K/uL Final    Comment: Mild elevation in immature granulocytes is non specific and   can be seen in a variety of conditions including stress response,   acute inflammation, trauma and pregnancy. Correlation with other   laboratory and clinical findings is essential.      Lymph # 01/23/2024 2.3  1.0 - 4.8 K/uL Final    Mono # 01/23/2024 0.4  0.3 - 1.0 K/uL Final    Eos # 01/23/2024 0.0  0.0 - 0.5 K/uL Final    Baso # 01/23/2024 0.02  0.00 - 0.20 K/uL Final    nRBC 01/23/2024 0  0 /100 WBC Final    Gran % 01/23/2024 58.6  38.0 - 73.0 % Final    Lymph % 01/23/2024 33.6  18.0 - 48.0 % Final    Mono % 01/23/2024 6.5  4.0 - 15.0 % Final    Eosinophil % 01/23/2024 0.6  0.0 - 8.0 % Final    Basophil % 01/23/2024 0.3  0.0 - 1.9 % Final    Differential Method 01/23/2024 Automated   Final        Imaging   CT Chest Abdomen Pelvis With IV Contrast (XPD) NO Oral Contrast 12/12/2023  Order: [id number]  Status: Final result       Visible to patient: Yes (not seen)       Next appt: 01/03/2024 at 09:00 AM in Chemotherapy ([facility])       Dx: Hypokalemia; Hypomagnesemia; Ovarian ...    0 Result Notes  Details    Reading Physician Reading Date Result Priority   May, Michele FLORES MD  [phone] 12/12/2023 STAT     Narrative & Impression  EXAMINATION:  CT CHEST ABDOMEN PELVIS WITH IV CONTRAST (XPD)     CLINICAL  HISTORY:  Hypokalemia,follow up ovarian cancer on treatment; evaluate treatment response for subsequent treatment planning.     TECHNIQUE:  Axial CT imaging was performed through the chest, abdomen and pelvis with  100cc  of intravenous contrast. Multiplanar reformats were performed and interpreted.     COMPARISON:  08/21/2023, 09/29/2022 and 09/24/2020     FINDINGS:  Chest:     The heart, great vessels, and mediastinal structures are within normal limits. No thoracic adenopathy.  Left-sided MediPort in the SVC.  Aortic atherosclerosis.     The lungs are clear bilaterally. No pleural effusions.     Abdomen:     The liver, spleen, kidneys, adrenal glands are within normal limits.  Stable 10 mm uncinate process pancreatic cyst.  No biliary ductal dilatation.     The gallbladder is unremarkable.     No free fluid, free air, or inflammatory change.     The small bowel is within normal limits.  Colonic diverticulosis.  Supraumbilical abdominal wall hernia containing a nondistended segment of the transverse colon.  No evidence of strangulation or incarceration.  The appendix is normal.     Aortic atherosclerosis without evidence of aneurysm.     Pelvis:     The urinary bladder is unremarkable. The uterus has been removed.  No free pelvic fluid or adenopathy.     No significant osseous abnormality is identified.  No suspicious osseous lesions.     Impression:     No CT evidence of recurrent or metastatic disease in the chest, abdomen or pelvis.     All CT scans at this facility are performed  using dose modulation techniques as appropriate to performed exam including the following:  automated exposure control; adjustment of mA and/or kV according to the patients size (this includes techniques or standardized protocols for targeted exams where dose is matched to indication/reason for exam: i.e. extremities or head);  iterative reconstruction technique.        Assessment and Plan   Recurrent, Stage IV Serous Ovarian Cancer  with Peritoneal Carcinomatosis - 01/2019   Treated with neoadjuvant chemotherapy with carboplatin paclitaxel and Avastin (02/2019 - 07/2019), followed by surgical resection in August 2019.  Patient was then treated with Avastin maintenance  08/21/2023 CT CAP: Marked interval disease progression with severe peritoneal carcinomatosis as detailed above.  12/12/2023 CT CAP: No CT evidence of recurrent or metastatic disease in the chest, abdomen or pelvis.  : 7 >> 21 >> 32 >> 64 >> 11  Started Carbo/Taxol 09/29/2023  C1 no complications  C2 and C3 (11/10/23): reaction consisting of feeling presyncopal, SOB, sweating; vitals significant for hypoxia and hypotension; evaluated by AI and recs for desensitization  Per medical record, patient declined treatment until after the new year  CT CAP 12/12/23 showed CLYDE  C4 -C6with desensitization  Continue follow up with Gyn Onc  We will consider maintenance therapy      Chemotherapy Induced Neuropathy  Intermittent neuropathy in feet  Continue to monitor      Left Groin Pain  Only experiences when bladder full  Will obtain UA and ultrasound of the groin; she may need early gyn exam        Chronic Medical Conditions  Osteoarthritis  HTN  Anxiety  Hx CHF        Med Onc Chart Routing      Follow up with physician 3 weeks.   Follow up with [facility]    Infusion scheduling note   C5 Carbo/Taxol Desensitization + Magnesium today; RTC in 3 weeks for Carbo/Taxol C6 Desensitization   Injection scheduling note    Labs CBC, CMP, magnesium and phosphorus   Scheduling:  Preferred lab:  Lab interval:     Imaging    Pharmacy appointment    Other referrals                     The patient was seen, interviewed and examined. Pertinent lab and radiologic studies were reviewed. Pt instructed to call should they develop concerning signs/symptoms or have further questions.        Portions of the record may have been created with voice recognition software. Occasional wrong-word or sound-a-like  substitutions may have occurred due to the inherent limitations of voice recognition software. Read the chart carefully and recognize, using context, where substitutions have occurred.      Torri Pugh MD    Hematology/Oncology

## 2024-01-24 NOTE — PLAN OF CARE
Problem: Adult Inpatient Plan of Care  Goal: Optimal Comfort and Wellbeing  Outcome: Ongoing, Progressing  Intervention: Provide Person-Centered Care  Flowsheets (Taken 1/24/2024 0851)  Trust Relationship/Rapport:   care explained   choices provided   emotional support provided   empathic listening provided   questions answered   questions encouraged   reassurance provided   thoughts/feelings acknowledged     Problem: Nausea and Vomiting (Chemotherapy Effects)  Goal: Fluid and Electrolyte Balance  Outcome: Ongoing, Progressing  Intervention: Prevent and Manage Nausea and Vomiting  Flowsheets (Taken 1/24/2024 0851)  Nausea/Vomiting Interventions: (pre-medicated per orders) other (see comments)  Environmental Support:   calm environment promoted   caregiver consistency promoted   comfort object encouraged   distractions minimized   environmental consistency promoted   personal routine supported   rest periods encouraged

## 2024-01-25 NOTE — NURSING
"- Patient progressing well through C5D1 of taxol/carboplatin regimen up until desensitization bag 3 of 4 of carboplatin. 10 minutes into bag 3 (around 2:50pm) patient reported symptoms of "heartburn/lump in my throat" and tingling/itching bilateral feet and palms of hands.   - Stopped carbo infusion, began normal saline flush, obtained VS, and administered compazine. Notified and consulted with Dr. Pugh and CHRISTIAN Pal, NP; decision made to proceed with 50mg benadryl IV and proceed with lowered rate of carboplatin once symptoms resolved.   - Carboplatin restarted at lowered rate of 90ml/hr at 3:30pm; patient tolerated well and per Dr. Pugh's instructions increased back to prescribed rate of 100ml/hr.  - Patient completed C5D1 with no further events and was discharged with stable VS, future appointments scheduled, and accompanied by spouse. Both verbalize understanding to report or seek urgent care for return of symptoms or onset of new symptoms.   "

## 2024-01-26 ENCOUNTER — OFFICE VISIT (OUTPATIENT)
Dept: ALLERGY | Facility: CLINIC | Age: 69
End: 2024-01-26
Payer: MEDICARE

## 2024-01-26 DIAGNOSIS — L29.9 PRURITUS: ICD-10-CM

## 2024-01-26 DIAGNOSIS — T50.905D ADVERSE EFFECT OF DRUG, SUBSEQUENT ENCOUNTER: Primary | ICD-10-CM

## 2024-01-26 DIAGNOSIS — T80.90XD INFUSION REACTION, SUBSEQUENT ENCOUNTER: ICD-10-CM

## 2024-01-26 PROCEDURE — 99214 OFFICE O/P EST MOD 30 MIN: CPT | Mod: 95,,, | Performed by: STUDENT IN AN ORGANIZED HEALTH CARE EDUCATION/TRAINING PROGRAM

## 2024-01-26 RX ORDER — HYDROXYZINE HYDROCHLORIDE 25 MG/1
25 TABLET, FILM COATED ORAL 3 TIMES DAILY PRN
Qty: 30 TABLET | Refills: 0 | Status: SHIPPED | OUTPATIENT
Start: 2024-01-26 | End: 2024-02-05

## 2024-01-26 NOTE — PROGRESS NOTES
The patient location is: [facility]   Visit type: audiovisual    Face to Face time with patient: 10  30 minutes of total time spent on the encounter, which includes face to face time and non-face to face time preparing to see the patient (eg, review of tests), Obtaining and/or reviewing separately obtained history, Documenting clinical information in the electronic or other health record, Independently interpreting results (not separately reported) and communicating results to the patient/family/caregiver, or Care coordination (not separately reported).     Each patient to whom he or she provides medical services by telemedicine is:  (1) informed of the relationship between the physician and patient and the respective role of any other health care provider with respect to management of the patient; and (2) notified that he or she may decline to receive medical services by telemedicine and may withdraw from such care at any time.    Allergy and Immunology  Established Patient Clinic Note    Date: 1/26/2024  No chief complaint on file.      History  Casie Gaines is a 68 y.o. female being seen for follow-up today.    Stage 4 Ovarian Cancer   Adverse Drug Reaction   Chemotherapy/Infusion Reaction   - Discussed at length desensitization protocol and reasoning   - Answered questions   - Protocol per determined per pharmacy  - Last infusion with reaction on second medication   - GI upset (indigestion sensation), itching, and tingling in hands and feet  - Resolved with benadryl   - Patient requesting itching medication for night after infusion      Prior HPI   - Patient with tx with paclitaxel and carboplatin in the past years prior to current regimen   - Opens potential for sensitization   - 09/2023: reinitiated without issues or reaction   - 10/2023: patient reported near completion of infusion she felt warm in head, tachypnea/hyperventilation, sweating. Patient was given benadryl with improvement. [place]  noting no drop in BP, no urticaria/rash, no LOC, no n/v/d or other GI issues, no swelling.   - 11/2023: patient again with similar reaction as 10/2023. [place] noting again - no drop in BP, no urticaria/rash, no LOC, no n/v/d or other GI issues, no swelling.   - Hx is more consistent with infusion reaction (vasovagal as well) than IgE mediated allergy  - Risk v benefit - better to follow desensitization protocol to ensure patient receives treatments as recommended during ideal timeframe   Allergies, PMH, PSH, Social, and Family History were reviewed.    Current Outpatient Medications on File Prior to Visit   Medication Sig Dispense Refill    albuterol 90 mcg/actuation inhaler Inhale 2 puffs into the lungs every 4 (four) hours as needed for Wheezing. Rescue 18 g 0    ALPRAZolam (XANAX) 0.25 MG tablet Take 1 tablet (0.25 mg total) by mouth 3 (three) times daily as needed for Anxiety. 60 tablet 2    amLODIPine (NORVASC) 5 MG tablet Take 2 tablets (10 mg total) by mouth once daily. 180 tablet 3    atenoloL (TENORMIN) 25 MG tablet Take 1 tablet (25 mg total) by mouth once daily. 90 tablet 2    azelastine (ASTELIN) 137 mcg (0.1 %) nasal spray 1 spray (137 mcg total) by Nasal route 2 (two) times daily. 30 mL 0    biotin 1 mg tablet Take 1,000 mcg by mouth once daily.       cetirizine (ZYRTEC) 10 MG tablet Take 1 tablet (10 mg total) by mouth once daily. 30 tablet 5    dexAMETHasone (DECADRON) 4 MG Tab Take 2 tablets (8 mg total) by mouth once daily. Take as directed on days 2, 3 and 4 of your chemotherapy cycle. 6 tablet 11    diclofenac sodium (VOLTAREN) 1 % Gel Apply once a day to back as needed 100 g 1    EPINEPHrine (EPIPEN) 0.3 mg/0.3 mL AtIn Inject 0.3 mLs (0.3 mg total) into the muscle once. for 1 dose 2 each 0    fluticasone propionate (FLONASE) 50 mcg/actuation nasal spray 1 spray (50 mcg total) by Each Nostril route every 12 (twelve) hours as needed for Rhinitis. 16 g 0    gabapentin (NEURONTIN) 100 MG capsule Take  1 capsule (100 mg total) by mouth 3 (three) times daily. 90 capsule 11    ginkgo biloba 40 mg Tab Take 40 mg by mouth.      montelukast (SINGULAIR) 10 mg tablet Take 1 tablet (10 mg total) by mouth every evening. 30 tablet 1    multivitamin with minerals tablet Take 1 tablet by mouth once daily.       OLANZapine (ZYPREXA) 5 MG tablet Take 1 tablet (5 mg total) by mouth every evening on days 1 through 4 of your chemotherapy regimen. 4 tablet 11    olmesartan-hydrochlorothiazide (BENICAR HCT) 40-25 mg per tablet Take 1 tablet by mouth once daily. 90 tablet 1    ondansetron (ZOFRAN-ODT) 8 MG TbDL Dissolve 1 tablet (8 mg total) under the tongue every 6 (six) hours as needed. 30 tablet 11    pantoprazole (PROTONIX) 40 MG tablet Take 1 tablet (40 mg total) by mouth once daily. 90 tablet 1    prochlorperazine (COMPAZINE) 5 MG tablet Take 2 tablets (10 mg total) by mouth every 6 (six) hours as needed (nausea or vomiting). 30 tablet 11    rosuvastatin (CRESTOR) 10 MG tablet Take 1 tablet (10 mg total) by mouth once daily. 90 tablet 1    sertraline (ZOLOFT) 25 MG tablet Take 1 tablet (25 mg total) by mouth once daily. 90 tablet 0    zinc gluconate 50 mg tablet Take 50 mg by mouth once daily.      [DISCONTINUED] hydrOXYzine HCL (ATARAX) 25 MG tablet Take 1 tablet (25 mg total) by mouth 3 (three) times daily as needed for Itching. 90 tablet 1     No current facility-administered medications on file prior to visit.     Physical Examination  There were no vitals filed for this visit.  General- Patient alert and oriented x3 in NAD  HEENT- PERRLA, EOMI, OP clear  Resp- No increased WOB noted. Not using accessory muscles.  GI- Non tender/non-distended  Extrem- No cyanosis, clubbing, edema.   Skin-  No Jaundice. No visible skin lesions.    Assessment/Plan:   Problem List Items Addressed This Visit          Derm    Pruritus    Relevant Medications    hydrOXYzine HCL (ATARAX) 25 MG tablet       Other    Drug reaction - Primary     Current Assessment & Plan     - Recommend benadryl IV prior to each medication   - Ordered Hydroxyzine 25 mg PRN for pruritus  - Discussed planning with patient          Infusion reaction     Follow up:  No follow-ups on file.    MD Edgar [facility]zaU[facility], [place]  Allergy and Immunology

## 2024-02-14 ENCOUNTER — INFUSION (OUTPATIENT)
Dept: INFUSION THERAPY | Facility: HOSPITAL | Age: 69
End: 2024-02-14
Attending: INTERNAL MEDICINE
Payer: MEDICARE

## 2024-02-14 ENCOUNTER — OFFICE VISIT (OUTPATIENT)
Dept: HEMATOLOGY/ONCOLOGY | Facility: CLINIC | Age: 69
End: 2024-02-14
Payer: MEDICARE

## 2024-02-14 VITALS
BODY MASS INDEX: 39.91 KG/M2 | SYSTOLIC BLOOD PRESSURE: 146 MMHG | HEIGHT: 67 IN | HEART RATE: 86 BPM | TEMPERATURE: 98 F | WEIGHT: 254.31 LBS | DIASTOLIC BLOOD PRESSURE: 72 MMHG | RESPIRATION RATE: 16 BRPM | OXYGEN SATURATION: 95 %

## 2024-02-14 VITALS
DIASTOLIC BLOOD PRESSURE: 58 MMHG | OXYGEN SATURATION: 98 % | BODY MASS INDEX: 39.91 KG/M2 | WEIGHT: 254.31 LBS | SYSTOLIC BLOOD PRESSURE: 113 MMHG | HEART RATE: 70 BPM | HEIGHT: 67 IN | TEMPERATURE: 98 F

## 2024-02-14 DIAGNOSIS — C78.6 PERITONEAL CARCINOMATOSIS: Primary | ICD-10-CM

## 2024-02-14 DIAGNOSIS — C56.3 OVARIAN CANCER, BILATERAL: ICD-10-CM

## 2024-02-14 DIAGNOSIS — Z79.899 IMMUNODEFICIENCY DUE TO CHEMOTHERAPY: Primary | ICD-10-CM

## 2024-02-14 DIAGNOSIS — C56.3 MALIGNANT NEOPLASM OF BOTH OVARIES: ICD-10-CM

## 2024-02-14 DIAGNOSIS — C78.6 PERITONEAL CARCINOMATOSIS: ICD-10-CM

## 2024-02-14 DIAGNOSIS — C56.1 MALIGNANT NEOPLASM OF RIGHT OVARY: ICD-10-CM

## 2024-02-14 DIAGNOSIS — D84.821 IMMUNODEFICIENCY DUE TO CHEMOTHERAPY: Primary | ICD-10-CM

## 2024-02-14 DIAGNOSIS — R22.42 LOCALIZED SWELLING OF LEFT LOWER LEG: ICD-10-CM

## 2024-02-14 DIAGNOSIS — T45.1X5A IMMUNODEFICIENCY DUE TO CHEMOTHERAPY: Primary | ICD-10-CM

## 2024-02-14 PROCEDURE — 3074F SYST BP LT 130 MM HG: CPT | Mod: CPTII,S$GLB,, | Performed by: INTERNAL MEDICINE

## 2024-02-14 PROCEDURE — 96413 CHEMO IV INFUSION 1 HR: CPT

## 2024-02-14 PROCEDURE — 96376 TX/PRO/DX INJ SAME DRUG ADON: CPT

## 2024-02-14 PROCEDURE — 96375 TX/PRO/DX INJ NEW DRUG ADDON: CPT

## 2024-02-14 PROCEDURE — 25000003 PHARM REV CODE 250: Performed by: INTERNAL MEDICINE

## 2024-02-14 PROCEDURE — 1159F MED LIST DOCD IN RCRD: CPT | Mod: CPTII,S$GLB,, | Performed by: INTERNAL MEDICINE

## 2024-02-14 PROCEDURE — 63600175 PHARM REV CODE 636 W HCPCS: Performed by: INTERNAL MEDICINE

## 2024-02-14 PROCEDURE — 99999 PR PBB SHADOW E&M-EST. PATIENT-LVL V: CPT | Mod: PBBFAC,,, | Performed by: INTERNAL MEDICINE

## 2024-02-14 PROCEDURE — 96417 CHEMO IV INFUS EACH ADDL SEQ: CPT

## 2024-02-14 PROCEDURE — 1126F AMNT PAIN NOTED NONE PRSNT: CPT | Mod: CPTII,S$GLB,, | Performed by: INTERNAL MEDICINE

## 2024-02-14 PROCEDURE — 3008F BODY MASS INDEX DOCD: CPT | Mod: CPTII,S$GLB,, | Performed by: INTERNAL MEDICINE

## 2024-02-14 PROCEDURE — A4216 STERILE WATER/SALINE, 10 ML: HCPCS | Performed by: INTERNAL MEDICINE

## 2024-02-14 PROCEDURE — 1101F PT FALLS ASSESS-DOCD LE1/YR: CPT | Mod: CPTII,S$GLB,, | Performed by: INTERNAL MEDICINE

## 2024-02-14 PROCEDURE — 96367 TX/PROPH/DG ADDL SEQ IV INF: CPT

## 2024-02-14 PROCEDURE — 3078F DIAST BP <80 MM HG: CPT | Mod: CPTII,S$GLB,, | Performed by: INTERNAL MEDICINE

## 2024-02-14 PROCEDURE — 96415 CHEMO IV INFUSION ADDL HR: CPT

## 2024-02-14 PROCEDURE — 3288F FALL RISK ASSESSMENT DOCD: CPT | Mod: CPTII,S$GLB,, | Performed by: INTERNAL MEDICINE

## 2024-02-14 PROCEDURE — 99215 OFFICE O/P EST HI 40 MIN: CPT | Mod: S$GLB,,, | Performed by: INTERNAL MEDICINE

## 2024-02-14 RX ORDER — HEPARIN 100 UNIT/ML
500 SYRINGE INTRAVENOUS
Status: DISCONTINUED | OUTPATIENT
Start: 2024-02-14 | End: 2024-02-14 | Stop reason: HOSPADM

## 2024-02-14 RX ORDER — PROCHLORPERAZINE EDISYLATE 5 MG/ML
5 INJECTION INTRAMUSCULAR; INTRAVENOUS ONCE AS NEEDED
Status: CANCELLED
Start: 2024-02-14

## 2024-02-14 RX ORDER — HEPARIN 100 UNIT/ML
500 SYRINGE INTRAVENOUS
Status: CANCELLED | OUTPATIENT
Start: 2024-02-14

## 2024-02-14 RX ORDER — SODIUM CHLORIDE 0.9 % (FLUSH) 0.9 %
10 SYRINGE (ML) INJECTION
Status: DISCONTINUED | OUTPATIENT
Start: 2024-02-14 | End: 2024-02-14 | Stop reason: HOSPADM

## 2024-02-14 RX ORDER — FAMOTIDINE 10 MG/ML
20 INJECTION INTRAVENOUS
Status: CANCELLED | OUTPATIENT
Start: 2024-02-14

## 2024-02-14 RX ORDER — PROCHLORPERAZINE EDISYLATE 5 MG/ML
5 INJECTION INTRAMUSCULAR; INTRAVENOUS ONCE AS NEEDED
Status: DISCONTINUED | OUTPATIENT
Start: 2024-02-14 | End: 2024-02-14 | Stop reason: HOSPADM

## 2024-02-14 RX ORDER — ALBUTEROL SULFATE 2.5 MG/.5ML
2.5 SOLUTION RESPIRATORY (INHALATION) EVERY 4 HOURS PRN
Status: CANCELLED
Start: 2024-02-14

## 2024-02-14 RX ORDER — DIPHENHYDRAMINE HYDROCHLORIDE 50 MG/ML
25 INJECTION INTRAMUSCULAR; INTRAVENOUS
Status: COMPLETED | OUTPATIENT
Start: 2024-02-14 | End: 2024-02-14

## 2024-02-14 RX ORDER — EPINEPHRINE 0.3 MG/.3ML
0.3 INJECTION SUBCUTANEOUS ONCE AS NEEDED
Status: DISCONTINUED | OUTPATIENT
Start: 2024-02-14 | End: 2024-02-14 | Stop reason: HOSPADM

## 2024-02-14 RX ORDER — DIPHENHYDRAMINE HYDROCHLORIDE 50 MG/ML
50 INJECTION INTRAMUSCULAR; INTRAVENOUS EVERY 6 HOURS PRN
Status: DISCONTINUED | OUTPATIENT
Start: 2024-02-14 | End: 2024-02-14 | Stop reason: HOSPADM

## 2024-02-14 RX ORDER — FAMOTIDINE 10 MG/ML
20 INJECTION INTRAVENOUS
Status: COMPLETED | OUTPATIENT
Start: 2024-02-14 | End: 2024-02-14

## 2024-02-14 RX ORDER — DIPHENHYDRAMINE HYDROCHLORIDE 50 MG/ML
50 INJECTION INTRAMUSCULAR; INTRAVENOUS
Status: CANCELLED | OUTPATIENT
Start: 2024-02-14

## 2024-02-14 RX ORDER — EPINEPHRINE 0.3 MG/.3ML
0.3 INJECTION SUBCUTANEOUS ONCE AS NEEDED
Status: CANCELLED | OUTPATIENT
Start: 2024-02-14

## 2024-02-14 RX ORDER — DIPHENHYDRAMINE HYDROCHLORIDE 50 MG/ML
50 INJECTION INTRAMUSCULAR; INTRAVENOUS
Status: DISCONTINUED | OUTPATIENT
Start: 2024-02-14 | End: 2024-02-14 | Stop reason: HOSPADM

## 2024-02-14 RX ORDER — DIPHENHYDRAMINE HYDROCHLORIDE 50 MG/ML
25 INJECTION INTRAMUSCULAR; INTRAVENOUS
Status: CANCELLED
Start: 2024-02-14

## 2024-02-14 RX ORDER — FAMOTIDINE 10 MG/ML
20 INJECTION INTRAVENOUS
Status: DISCONTINUED | OUTPATIENT
Start: 2024-02-14 | End: 2024-02-14 | Stop reason: HOSPADM

## 2024-02-14 RX ORDER — SODIUM CHLORIDE 0.9 % (FLUSH) 0.9 %
10 SYRINGE (ML) INJECTION
Status: CANCELLED | OUTPATIENT
Start: 2024-02-14

## 2024-02-14 RX ORDER — FAMOTIDINE 10 MG/ML
20 INJECTION INTRAVENOUS
Status: CANCELLED
Start: 2024-02-14

## 2024-02-14 RX ORDER — ALBUTEROL SULFATE 0.83 MG/ML
2.5 SOLUTION RESPIRATORY (INHALATION) EVERY 4 HOURS PRN
Status: DISCONTINUED | OUTPATIENT
Start: 2024-02-14 | End: 2024-02-14 | Stop reason: HOSPADM

## 2024-02-14 RX ORDER — DIPHENHYDRAMINE HYDROCHLORIDE 50 MG/ML
50 INJECTION INTRAMUSCULAR; INTRAVENOUS EVERY 6 HOURS PRN
Status: CANCELLED | OUTPATIENT
Start: 2024-02-14

## 2024-02-14 RX ADMIN — DIPHENHYDRAMINE HYDROCHLORIDE 50 MG: 50 INJECTION, SOLUTION INTRAMUSCULAR; INTRAVENOUS at 01:02

## 2024-02-14 RX ADMIN — CARBOPLATIN 5 MG: 10 INJECTION, SOLUTION INTRAVENOUS at 02:02

## 2024-02-14 RX ADMIN — FAMOTIDINE 20 MG: 10 INJECTION, SOLUTION INTRAVENOUS at 03:02

## 2024-02-14 RX ADMIN — CARBOPLATIN 75 MG: 10 INJECTION, SOLUTION INTRAVENOUS at 03:02

## 2024-02-14 RX ADMIN — FAMOTIDINE 20 MG: 10 INJECTION, SOLUTION INTRAVENOUS at 09:02

## 2024-02-14 RX ADMIN — DIPHENHYDRAMINE HYDROCHLORIDE 25 MG: 50 INJECTION, SOLUTION INTRAMUSCULAR; INTRAVENOUS at 03:02

## 2024-02-14 RX ADMIN — PACLITAXEL 408 MG: 6 INJECTION, SOLUTION INTRAVENOUS at 10:02

## 2024-02-14 RX ADMIN — DEXAMETHASONE SODIUM PHOSPHATE 10 MG: 4 INJECTION, SOLUTION INTRA-ARTICULAR; INTRALESIONAL; INTRAMUSCULAR; INTRAVENOUS; SOFT TISSUE at 01:02

## 2024-02-14 RX ADMIN — CARBOPLATIN 650 MG: 10 INJECTION, SOLUTION INTRAVENOUS at 05:02

## 2024-02-14 RX ADMIN — DIPHENHYDRAMINE HYDROCHLORIDE 50 MG: 50 INJECTION, SOLUTION INTRAMUSCULAR; INTRAVENOUS at 09:02

## 2024-02-14 RX ADMIN — Medication 10 ML: at 06:02

## 2024-02-14 RX ADMIN — DEXAMETHASONE SODIUM PHOSPHATE 0.25 MG: 4 INJECTION, SOLUTION INTRA-ARTICULAR; INTRALESIONAL; INTRAMUSCULAR; INTRAVENOUS; SOFT TISSUE at 10:02

## 2024-02-14 RX ADMIN — APREPITANT 130 MG: 130 INJECTION, EMULSION INTRAVENOUS at 09:02

## 2024-02-14 RX ADMIN — FAMOTIDINE 20 MG: 10 INJECTION, SOLUTION INTRAVENOUS at 01:02

## 2024-02-14 RX ADMIN — HEPARIN 500 UNITS: 100 SYRINGE at 06:02

## 2024-02-14 NOTE — PROGRESS NOTES
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 female who presents to clinic for follow up.    She underwent desentation with carboplatin and Taxol l with her last cycle.  She is here for repeat again.  She has no acute complaints and overall feels well.  She has been having some chronic swelling in her left leg and requests repeat cardiology evaluation.  She also previously reported left groin pain when her bladder is full but that has resolved and she did not obtain the groin ultrasound. We discussed maintenance therapy and will decide on next steps after post treatment PET-CT.    Review of Systems:  Review of Systems   Constitutional:  Negative for activity change, appetite change, chills, diaphoresis, fatigue, fever and unexpected weight change.   HENT:  Negative for nosebleeds.    Respiratory:  Negative for shortness of breath.    Cardiovascular:  Negative for chest pain.   Gastrointestinal:  Negative for abdominal distention, abdominal pain, anal bleeding, blood in stool, constipation, diarrhea, nausea and vomiting.   Genitourinary:  Negative for difficulty urinating and hematuria.   Musculoskeletal:  Negative for arthralgias, back pain and myalgias.   Skin:  Negative for rash.   Neurological:  Negative for dizziness, weakness, light-headedness and headaches.   Hematological:  Does not bruise/bleed easily.   Psychiatric/Behavioral:  The patient is  not nervous/anxious.      History     Oncology History   Peritoneal carcinomatosis   1/26/2019 Initial Diagnosis    Peritoneal carcinomatosis     2/15/2019 - 7/8/2019 Chemotherapy    Treatment Summary   Plan Name: OP GYN PACLITAXEL CARBOPLATIN (AUC 6) Q3W  Treatment Goal: Palliative  Status: Inactive  Start Date: 2/28/2019  End Date: 6/18/2019  Provider: Will Trevizo Jr., MD  Chemotherapy: bevacizumab (AVASTIN) 15 mg/kg = 1,600 mg in sodium chloride 0.9% 100 mL chemo infusion, 15 mg/kg = 1,600 mg (100 % of original dose 15 mg/kg), Intravenous, Clinic/[facility] 1 time, 6 of 6 cycles  Dose modification: 15 mg/kg (original dose 15 mg/kg, Cycle 1)  Administration: 1,600 mg (2/28/2019), 1,600 mg (3/21/2019), 1,600 mg (4/11/2019), 1,600 mg (5/7/2019), 1,600 mg (5/28/2019), 1,510 mg (6/18/2019)  CARBOplatin (PARAPLATIN) 870 mg in sodium chloride 0.9% 337 mL chemo infusion, 870 mg (100 % of original dose 868.8 mg), Intravenous, Clinic/[facility] 1 time, 6 of 6 cycles  Dose modification:   (original dose 868.8 mg, Cycle 1)  Administration: 870 mg (2/28/2019), 870 mg (3/21/2019), 900 mg (4/11/2019), 870 mg (5/7/2019), 660 mg (5/28/2019), 690 mg (6/18/2019)  PACLitaxel (TAXOL) 175 mg/m2 = 396 mg in sodium chloride 0.9% 566 mL chemo infusion, 175 mg/m2 = 396 mg, Intravenous, Clinic/[facility] 1 time, 6 of 6 cycles  Dose modification: 140 mg/m2 (80 % of original dose 175 mg/m2, Cycle 5)  Administration: 396 mg (2/28/2019), 396 mg (3/21/2019), 396 mg (4/11/2019), 396 mg (5/7/2019), 318 mg (5/28/2019), 306 mg (6/18/2019)     10/9/2019 - 9/24/2020 Chemotherapy    Treatment Summary   Plan Name: OP BEVACIZUMAB Q3W   Treatment Goal: Maintenance  Status: Inactive  Start Date: 10/9/2019  End Date: 9/24/2020  Provider: Oscar Mckeon MD  Chemotherapy: dexAMETHasone tablet 8 mg, 8 mg (100 % of original dose 8 mg), Oral, Clinic/[facility] 1 time, 2 of 8 cycles  Dose modification: 8 mg (original dose 8 mg, Cycle 8), 8 mg (original dose 8 mg, Cycle  12)  Administration: 8 mg (7/22/2020), 8 mg (8/13/2020)  bevacizumab (AVASTIN) 15 mg/kg = 1,615 mg in sodium chloride 0.9% 100 mL chemo infusion, 15 mg/kg = 1,615 mg, Intravenous, Clinic/[facility] 1 time, 11 of 17 cycles  Administration: 1,615 mg (10/9/2019), 1,615 mg (10/29/2019), 1,615 mg (12/10/2019), 1,615 mg (1/21/2020), 1,600 mg (4/2/2020), 1,615 mg (4/23/2020), 1,600 mg (7/1/2020), 1,600 mg (7/22/2020), 1,600 mg (8/13/2020), 1,795 mg (9/3/2020), 1,795 mg (9/24/2020)     9/29/2023 -  Chemotherapy    Treatment Summary   Plan Name: OP GYN PACLITAXEL CARBOPLATIN desensitization (AUC 5) Q3W  Treatment Goal: Control  Status: Active  Start Date: 9/29/2023  End Date: 10/2/2024 (Planned)  Provider: Ismael Juarez MD  Chemotherapy: CARBOplatin (PARAPLATIN) 650 mg in sodium chloride 0.9% 335 mL chemo infusion, 650 mg (100 % of original dose 648 mg), Intravenous, Clinic/[facility] 1 time, 6 of 17 cycles  Dose modification:   (original dose 648 mg, Cycle 1),   (original dose 853.8 mg, Cycle 2),   (original dose 730 mg, Cycle 3),   (original dose 730 mg, Cycle 4), 5 mg (original dose 730 mg, Cycle 4), 7.3 mg (original dose 730 mg, Cycle 4), 5 mg (original dose 730 mg, Cycle 4, Reason: MD Discretion, Comment: desensitization), 73 mg (original dose 730 mg, Cycle 4), 720 mg (original dose 730 mg, Cycle 4, Reason: MD Discretion, Comment: desensitization), 648.97 mg (original dose 730 mg, Cycle 4, Reason: MD Discretion, Comment: desensitization), 0.61 mg (original dose 730 mg, Cycle 5, Reason: MD Discretion, Comment: desensitization), 5 mg (original dose 730 mg, Cycle 5, Reason: MD Discretion, Comment: desens), 60.5 mg (original dose 730 mg, Cycle 5, Reason: MD Discretion, Comment: desensitization),   (original dose 730 mg, Cycle 5, Reason: MD Discretion, Comment: new scr), 6.05 mg (original dose 730 mg, Cycle 5, Reason: Dose not tolerated, Comment: desensitization)  Administration: 650 mg (9/29/2023), 730 mg (11/10/2023), 710 mg  (10/20/2023), 5 mg (1/3/2024), 5 mg (1/3/2024), 75 mg (1/3/2024), 650 mg (1/3/2024), 5 mg (1/24/2024), 60 mg (1/24/2024), 605 mg (1/24/2024), 5 mg (1/24/2024)  PACLitaxeL (TAXOL) 175 mg/m2 = 402 mg in sodium chloride 0.9% 500 mL chemo infusion, 175 mg/m2 = 402 mg, Intravenous, Clinic/[facility] 1 time, 6 of 17 cycles  Administration: 402 mg (9/29/2023), 402 mg (10/20/2023), 408 mg (11/10/2023), 408 mg (1/3/2024), 408 mg (1/24/2024)     Malignant neoplasm of right ovary (Resolved)   2/15/2019 Initial Diagnosis    Malignant neoplasm of right ovary     2/15/2019 - 7/8/2019 Chemotherapy    Treatment Summary   Plan Name: OP GYN PACLITAXEL CARBOPLATIN (AUC 6) Q3W  Treatment Goal: Palliative  Status: Inactive  Start Date: 2/28/2019  End Date: 6/18/2019  Provider: Will Trevizo Jr., MD  Chemotherapy: bevacizumab (AVASTIN) 15 mg/kg = 1,600 mg in sodium chloride 0.9% 100 mL chemo infusion, 15 mg/kg = 1,600 mg (100 % of original dose 15 mg/kg), Intravenous, [facility] 1 time, 6 of 6 cycles  Dose modification: 15 mg/kg (original dose 15 mg/kg, Cycle 1)  Administration: 1,600 mg (2/28/2019), 1,600 mg (3/21/2019), 1,600 mg (4/11/2019), 1,600 mg (5/7/2019), 1,600 mg (5/28/2019), 1,510 mg (6/18/2019)  CARBOplatin (PARAPLATIN) 870 mg in sodium chloride 0.9% 337 mL chemo infusion, 870 mg (100 % of original dose 868.8 mg), Intravenous, Clinic/[facility] 1 time, 6 of 6 cycles  Dose modification:   (original dose 868.8 mg, Cycle 1)  Administration: 870 mg (2/28/2019), 870 mg (3/21/2019), 900 mg (4/11/2019), 870 mg (5/7/2019), 660 mg (5/28/2019), 690 mg (6/18/2019)  PACLitaxel (TAXOL) 175 mg/m2 = 396 mg in sodium chloride 0.9% 566 mL chemo infusion, 175 mg/m2 = 396 mg, Intravenous, [facility] [place] 1 time, 6 of 6 cycles  Dose modification: 140 mg/m2 (80 % of original dose 175 mg/m2, Cycle 5)  Administration: 396 mg (2/28/2019), 396 mg (3/21/2019), 396 mg (4/11/2019), 396 mg (5/7/2019), 318 mg (5/28/2019), 306 mg (6/18/2019)     10/9/2019 - 9/24/2020  Chemotherapy    Treatment Summary   Plan Name: OP BEVACIZUMAB Q3W   Treatment Goal: Maintenance  Status: Inactive  Start Date: 10/9/2019  End Date: 9/24/2020  Provider: Oscar Mckeon MD  Chemotherapy: dexAMETHasone tablet 8 mg, 8 mg (100 % of original dose 8 mg), Oral, [facility] 1 time, 2 of 8 cycles  Dose modification: 8 mg (original dose 8 mg, Cycle 8), 8 mg (original dose 8 mg, Cycle 12)  Administration: 8 mg (7/22/2020), 8 mg (8/13/2020)  bevacizumab (AVASTIN) 15 mg/kg = 1,615 mg in sodium chloride 0.9% 100 mL chemo infusion, 15 mg/kg = 1,615 mg, Intravenous, Clinic/[facility] 1 time, 11 of 17 cycles  Administration: 1,615 mg (10/9/2019), 1,615 mg (10/29/2019), 1,615 mg (12/10/2019), 1,615 mg (1/21/2020), 1,600 mg (4/2/2020), 1,615 mg (4/23/2020), 1,600 mg (7/1/2020), 1,600 mg (7/22/2020), 1,600 mg (8/13/2020), 1,795 mg (9/3/2020), 1,795 mg (9/24/2020)     Malignant neoplasm of both ovaries (Resolved)   2/18/2019 Initial Diagnosis    Ovarian cancer     10/9/2019 - 9/24/2020 Chemotherapy    Treatment Summary   Plan Name: OP BEVACIZUMAB Q3W   Treatment Goal: Maintenance  Status: Inactive  Start Date: 10/9/2019  End Date: 9/24/2020  Provider: Oscar Mckeon MD  Chemotherapy: dexAMETHasone tablet 8 mg, 8 mg (100 % of original dose 8 mg), Oral, [facility] 1 time, 2 of 8 cycles  Dose modification: 8 mg (original dose 8 mg, Cycle 8), 8 mg (original dose 8 mg, Cycle 12)  Administration: 8 mg (7/22/2020), 8 mg (8/13/2020)  bevacizumab (AVASTIN) 15 mg/kg = 1,615 mg in sodium chloride 0.9% 100 mL chemo infusion, 15 mg/kg = 1,615 mg, Intravenous, Clinic/[facility] 1 time, 11 of 17 cycles  Administration: 1,615 mg (10/9/2019), 1,615 mg (10/29/2019), 1,615 mg (12/10/2019), 1,615 mg (1/21/2020), 1,600 mg (4/2/2020), 1,615 mg (4/23/2020), 1,600 mg (7/1/2020), 1,600 mg (7/22/2020), 1,600 mg (8/13/2020), 1,795 mg (9/3/2020), 1,795 mg (9/24/2020)     Ovarian cancer, bilateral   8/15/2019 Initial Diagnosis    Ovarian cancer, bilateral      10/9/2019 - 9/24/2020 Chemotherapy    Treatment Summary   Plan Name: OP BEVACIZUMAB Q3W   Treatment Goal: Maintenance  Status: Inactive  Start Date: 10/9/2019  End Date: 9/24/2020  Provider: Oscar Mckeon MD  Chemotherapy: dexAMETHasone tablet 8 mg, 8 mg (100 % of original dose 8 mg), Oral, Clinic/[facility] 1 time, 2 of 8 cycles  Dose modification: 8 mg (original dose 8 mg, Cycle 8), 8 mg (original dose 8 mg, Cycle 12)  Administration: 8 mg (7/22/2020), 8 mg (8/13/2020)  bevacizumab (AVASTIN) 15 mg/kg = 1,615 mg in sodium chloride 0.9% 100 mL chemo infusion, 15 mg/kg = 1,615 mg, Intravenous, Clinic/[facility] 1 time, 11 of 17 cycles  Administration: 1,615 mg (10/9/2019), 1,615 mg (10/29/2019), 1,615 mg (12/10/2019), 1,615 mg (1/21/2020), 1,600 mg (4/2/2020), 1,615 mg (4/23/2020), 1,600 mg (7/1/2020), 1,600 mg (7/22/2020), 1,600 mg (8/13/2020), 1,795 mg (9/3/2020), 1,795 mg (9/24/2020)     9/29/2023 -  Chemotherapy    Treatment Summary   Plan Name: OP GYN PACLITAXEL CARBOPLATIN desensitization (AUC 5) Q3W  Treatment Goal: Control  Status: Active  Start Date: 9/29/2023  End Date: 10/2/2024 (Planned)  Provider: Ismael Juarez MD  Chemotherapy: CARBOplatin (PARAPLATIN) 650 mg in sodium chloride 0.9% 335 mL chemo infusion, 650 mg (100 % of original dose 648 mg), Intravenous, Clinic/[facility] 1 time, 6 of 17 cycles  Dose modification:   (original dose 648 mg, Cycle 1),   (original dose 853.8 mg, Cycle 2),   (original dose 730 mg, Cycle 3),   (original dose 730 mg, Cycle 4), 5 mg (original dose 730 mg, Cycle 4), 7.3 mg (original dose 730 mg, Cycle 4), 5 mg (original dose 730 mg, Cycle 4, Reason: MD Discretion, Comment: desensitization), 73 mg (original dose 730 mg, Cycle 4), 720 mg (original dose 730 mg, Cycle 4, Reason: MD Discretion, Comment: desensitization), 648.97 mg (original dose 730 mg, Cycle 4, Reason: MD Discretion, Comment: desensitization), 0.61 mg (original dose 730 mg, Cycle 5, Reason: MD Discretion, Comment:  desensitization), 5 mg (original dose 730 mg, Cycle 5, Reason: MD Discretion, Comment: desens), 60.5 mg (original dose 730 mg, Cycle 5, Reason: MD Discretion, Comment: desensitization),   (original dose 730 mg, Cycle 5, Reason: MD Discretion, Comment: new scr), 6.05 mg (original dose 730 mg, Cycle 5, Reason: Dose not tolerated, Comment: desensitization)  Administration: 650 mg (2023), 730 mg (11/10/2023), 710 mg (10/20/2023), 5 mg (1/3/2024), 5 mg (1/3/2024), 75 mg (1/3/2024), 650 mg (1/3/2024), 5 mg (2024), 60 mg (2024), 605 mg (2024), 5 mg (2024)  PACLitaxeL (TAXOL) 175 mg/m2 = 402 mg in sodium chloride 0.9% 500 mL chemo infusion, 175 mg/m2 = 402 mg, Intravenous, Clinic/[facility] 1 time, 6 of 17 cycles  Administration: 402 mg (2023), 402 mg (10/20/2023), 408 mg (11/10/2023), 408 mg (1/3/2024), 408 mg (2024)           Past Medical History:   Diagnosis Date    Anemia     Anxiety     Arthritis     knees    Cancer     ovarian    CHF (congestive heart failure)     Hx antineoplastic chemotherapy     last 2019    Hyperlipemia     Hypertension     Neck pain     Ovarian cancer 2019    CHEMO    Peritoneal carcinomatosis 2019       Past Surgical History:   Procedure Laterality Date    breast reduction  10/02/2018    CATARACT EXTRACTION Bilateral     2023     SECTION      X 1    COLONOSCOPY N/A 2021    Procedure: COLONOSCOPY;  Surgeon: Paulette Rojas MD;  Location: [facility] ENDO;  Service: Gastroenterology;  Laterality: N/A;    CYSTOSCOPY W/ URETERAL STENT PLACEMENT Right 2019    Procedure: CYSTOSCOPY, WITH URETERAL STENT INSERTION;  Surgeon: Timmy Santiago IV, MD;  Location: [facility] OR;  Service: Urology;  Laterality: Right;    CYSTOSCOPY W/ URETERAL STENT REMOVAL  10/04/2019    DILATION AND CURETTAGE OF UTERUS      HYSTERECTOMY      RALH for fibroids (still has ovaries)    INSERTION OF VENOUS ACCESS PORT Left 2019    Procedure: INSERTION, VENOUS ACCESS  PORT;  Surgeon: Ulisses Monzon MD;  Location: [facility] OR;  Service: General;  Laterality: Left;  Left internal jugular     LYSIS OF ADHESIONS OF URETER N/A 08/15/2019    Procedure: URETEROLYSIS;  Surgeon: Ismael Juarez MD;  Location: [facility] OR;  Service: OB/GYN;  Laterality: N/A;    OMENTECTOMY N/A 08/15/2019    Procedure: OMENTECTOMY;  Surgeon: Ismael Juarez MD;  Location: [facility] OR;  Service: OB/GYN;  Laterality: N/A;    RETROGRADE PYELOGRAPHY Right 2019    Procedure: PYELOGRAM, RETROGRADE;  Surgeon: Timmy Santiago IV, MD;  Location: [facility] OR;  Service: Urology;  Laterality: Right;    ROBOT-ASSISTED LAPAROSCOPIC SALPINGO-OOPHORECTOMY USING DA TIA XI Bilateral 08/15/2019    Procedure: XI ROBOTIC SALPINGO-OOPHORECTOMY;  Surgeon: Ismael Juarez MD;  Location: [facility] OR;  Service: OB/GYN;  Laterality: Bilateral;    TOTAL REDUCTION MAMMOPLASTY  2018    TUBAL LIGATION         Family History   Problem Relation Age of Onset    Glaucoma Mother     Colon cancer Brother     Breast cancer Maternal Aunt     Breast cancer Paternal Aunt     Ovarian cancer Paternal Aunt     Anesthesia problems Other     Thrombophilia Neg Hx        Review of patient's allergies indicates:  No Known Allergies    Social History     Tobacco Use    Smoking status: Former     Current packs/day: 0.00     Average packs/day: 1 pack/day for 25.0 years (25.0 ttl pk-yrs)     Types: Cigarettes     Start date: 10/1/1993     Quit date: 10/1/2018     Years since quittin.3    Smokeless tobacco: Never    Tobacco comments:     States started quit 2 months ago after 30 years   Substance Use Topics    Alcohol use: Yes     Comment: occasionally  No alcohol 72h prior to sx    Drug use: No     Physical Exam     Vitals:    24 0804   BP: (!) 113/58   Pulse: 70   Temp: 97.7 °F (36.5 °C)     Physical Exam  Constitutional:       General: She is not in acute distress.     Appearance: Normal appearance. She is not ill-appearing, toxic-appearing or diaphoretic.    HENT:      Head: Normocephalic and atraumatic.   Eyes:      Extraocular Movements: Extraocular movements intact.      Conjunctiva/sclera: Conjunctivae normal.   Cardiovascular:      Rate and Rhythm: Normal rate.   Pulmonary:      Effort: Pulmonary effort is normal.   Skin:     General: Skin is warm.   Neurological:      General: No focal deficit present.      Mental Status: She is alert and oriented to person, place, and time. Mental status is at baseline.   Psychiatric:         Mood and Affect: Mood normal.         Behavior: Behavior normal.         Thought Content: Thought content normal.       Labs   Labs:  Lab Visit on 02/14/2024   Component Date Value Ref Range Status    Phosphorus 02/14/2024 3.3  2.7 - 4.5 mg/dL Final    Magnesium 02/14/2024 1.4 (L)  1.6 - 2.6 mg/dL Final    Sodium 02/14/2024 145  136 - 145 mmol/L Final    Potassium 02/14/2024 3.9  3.5 - 5.1 mmol/L Final    Chloride 02/14/2024 107  95 - 110 mmol/L Final    CO2 02/14/2024 29  23 - 29 mmol/L Final    Glucose 02/14/2024 105  70 - 110 mg/dL Final    BUN 02/14/2024 16  8 - 23 mg/dL Final    Creatinine 02/14/2024 0.8  0.5 - 1.4 mg/dL Final    Calcium 02/14/2024 9.7  8.7 - 10.5 mg/dL Final    Total Protein 02/14/2024 7.0  6.0 - 8.4 g/dL Final    Albumin 02/14/2024 3.6  3.5 - 5.2 g/dL Final    Total Bilirubin 02/14/2024 0.4  0.1 - 1.0 mg/dL Final    Comment: For infants and newborns, interpretation of results should be based  on gestational age, weight and in agreement with clinical  observations.    Premature Infant recommended reference ranges:  Up to 24 hours.............<8.0 mg/dL  Up to 48 hours............<12.0 mg/dL  3-5 days..................<15.0 mg/dL  6-29 days.................<15.0 mg/dL      Alkaline Phosphatase 02/14/2024 113  55 - 135 U/L Final    AST 02/14/2024 17  10 - 40 U/L Final    ALT 02/14/2024 20  10 - 44 U/L Final    eGFR 02/14/2024 >60  >60 mL/min/1.73 m^2 Final    Anion Gap 02/14/2024 9  8 - 16 mmol/L Final    WBC  02/14/2024 7.11  3.90 - 12.70 K/uL Final    RBC 02/14/2024 3.60 (L)  4.00 - 5.40 M/uL Final    Hemoglobin 02/14/2024 10.4 (L)  12.0 - 16.0 g/dL Final    Hematocrit 02/14/2024 32.8 (L)  37.0 - 48.5 % Final    MCV 02/14/2024 91  82 - 98 fL Final    MCH 02/14/2024 28.9  27.0 - 31.0 pg Final    MCHC 02/14/2024 31.7 (L)  32.0 - 36.0 g/dL Final    RDW 02/14/2024 14.9 (H)  11.5 - 14.5 % Final    Platelets 02/14/2024 268  150 - 450 K/uL Final    MPV 02/14/2024 9.6  9.2 - 12.9 fL Final    Immature Granulocytes 02/14/2024 0.4  0.0 - 0.5 % Final    Gran # (ANC) 02/14/2024 3.9  1.8 - 7.7 K/uL Final    Immature Grans (Abs) 02/14/2024 0.03  0.00 - 0.04 K/uL Final    Comment: Mild elevation in immature granulocytes is non specific and   can be seen in a variety of conditions including stress response,   acute inflammation, trauma and pregnancy. Correlation with other   laboratory and clinical findings is essential.      Lymph # 02/14/2024 2.4  1.0 - 4.8 K/uL Final    Mono # 02/14/2024 0.6  0.3 - 1.0 K/uL Final    Eos # 02/14/2024 0.1  0.0 - 0.5 K/uL Final    Baso # 02/14/2024 0.05  0.00 - 0.20 K/uL Final    nRBC 02/14/2024 0  0 /100 WBC Final    Gran % 02/14/2024 55.3  38.0 - 73.0 % Final    Lymph % 02/14/2024 33.6  18.0 - 48.0 % Final    Mono % 02/14/2024 9.0  4.0 - 15.0 % Final    Eosinophil % 02/14/2024 1.0  0.0 - 8.0 % Final    Basophil % 02/14/2024 0.7  0.0 - 1.9 % Final    Differential Method 02/14/2024 Automated   Final        Imaging   CT Chest Abdomen Pelvis With IV Contrast (XPD) NO Oral Contrast 12/12/2023  Order: [id number]  Status: Final result       Visible to patient: Yes (not seen)       Next appt: 01/03/2024 at 09:00 AM in Chemotherapy (CHA[facility])       Dx: Hypokalemia; Hypomagnesemia; Ovarian ...    0 Result Notes  Details    Reading Physician Reading Date Result Priority   May, Michele FLORES MD  [phone] 12/12/2023 STAT     Narrative & Impression  EXAMINATION:  CT CHEST ABDOMEN PELVIS WITH IV CONTRAST  (XPD)     CLINICAL HISTORY:  Hypokalemia,follow up ovarian cancer on treatment; evaluate treatment response for subsequent treatment planning.     TECHNIQUE:  Axial CT imaging was performed through the chest, abdomen and pelvis with  100cc  of intravenous contrast. Multiplanar reformats were performed and interpreted.     COMPARISON:  08/21/2023, 09/29/2022 and 09/24/2020     FINDINGS:  Chest:     The heart, great vessels, and mediastinal structures are within normal limits. No thoracic adenopathy.  Left-sided MediPort in the SVC.  Aortic atherosclerosis.     The lungs are clear bilaterally. No pleural effusions.     Abdomen:     The liver, spleen, kidneys, adrenal glands are within normal limits.  Stable 10 mm uncinate process pancreatic cyst.  No biliary ductal dilatation.     The gallbladder is unremarkable.     No free fluid, free air, or inflammatory change.     The small bowel is within normal limits.  Colonic diverticulosis.  Supraumbilical abdominal wall hernia containing a nondistended segment of the transverse colon.  No evidence of strangulation or incarceration.  The appendix is normal.     Aortic atherosclerosis without evidence of aneurysm.     Pelvis:     The urinary bladder is unremarkable. The uterus has been removed.  No free pelvic fluid or adenopathy.     No significant osseous abnormality is identified.  No suspicious osseous lesions.     Impression:     No CT evidence of recurrent or metastatic disease in the chest, abdomen or pelvis.     All CT scans at this facility are performed  using dose modulation techniques as appropriate to performed exam including the following:  automated exposure control; adjustment of mA and/or kV according to the patients size (this includes techniques or standardized protocols for targeted exams where dose is matched to indication/reason for exam: i.e. extremities or head);  iterative reconstruction technique.        Assessment and Plan   Recurrent, Stage IV  Serous Ovarian Cancer with Peritoneal Carcinomatosis - 01/2019   Treated with neoadjuvant chemotherapy with carboplatin paclitaxel and Avastin (02/2019 - 07/2019), followed by surgical resection in August 2019.  Patient was then treated with Avastin maintenance  08/21/2023 CT CAP: Marked interval disease progression with severe peritoneal carcinomatosis as detailed above.  12/12/2023 CT CAP: No CT evidence of recurrent or metastatic disease in the chest, abdomen or pelvis.  : 7 >> 21 >> 32 >> 64 >> 11  Started Carbo/Taxol 09/29/2023  C1 no complications  C2 and C3 (11/10/23): reaction consisting of feeling presyncopal, SOB, sweating; vitals significant for hypoxia and hypotension; evaluated by AI and recs for desensitization  Per medical record, patient declined treatment until after the new year  CT CAP 12/12/23 showed CLYDE  C4 -C6with desensitization  Continue follow up with Gyn Onc  We will consider maintenance therapy after repeat literature review and discussion with our clinical pharmacist if post treatment PET-CT clear      Chemotherapy Induced Neuropathy  Intermittent neuropathy in feet  Continue to monitor      Left Groin Pain  Only experiences when bladder full  Will obtain UA and ultrasound of the groin; she may need early gyn exam        Chronic Medical Conditions  Osteoarthritis  HTN  Anxiety        Med Onc Chart Routing      Follow up with physician 3 weeks.   Follow up with [facility]    Infusion scheduling note   C6 Carbo/Taxol Desensitization + Magnesium today   Injection scheduling note    Labs CBC, CMP, magnesium and phosphorus   Scheduling:  Preferred lab:  Lab interval:     Imaging PET scan   prior to next appointment with me   Pharmacy appointment    Other referrals       Additional referrals needed  Cardiology                The patient was seen, interviewed and examined. Pertinent lab and radiologic studies were reviewed. Pt instructed to call should they develop concerning signs/symptoms or  have further questions.        Portions of the record may have been created with voice recognition software. Occasional wrong-word or sound-a-like substitutions may have occurred due to the inherent limitations of voice recognition software. Read the chart carefully and recognize, using context, where substitutions have occurred.      Torri Pugh MD    Hematology/Oncology

## 2024-02-15 NOTE — PLAN OF CARE
"Pt progressed through treatment well. During bag 3 of carboplatin pt reported itching to bilateral palms. Infusion paused, NS flush started, and benadryl 25mg IVP given. Pt reported resolution of itching but symptoms of "heartburn". NS flush continued and pepcid 20mg IVP given at 1631. 30 min later pt reported resolution of all symptoms and carboplatin bag 3 resumed with no further symptoms. Bag 4 started and completed uneventfully,    Problem: Adult Inpatient Plan of Care  Goal: Plan of Care Review  Outcome: Ongoing, Progressing  Flowsheets (Taken 2/14/2024 1850)  Plan of Care Reviewed With: patient  Goal: Optimal Comfort and Wellbeing  Outcome: Ongoing, Progressing  Intervention: Provide Person-Centered Care  Flowsheets (Taken 2/14/2024 1850)  Trust Relationship/Rapport:   care explained   reassurance provided   choices provided   thoughts/feelings acknowledged   emotional support provided   empathic listening provided   questions answered   questions encouraged     "

## 2024-02-15 NOTE — DISCHARGE INSTRUCTIONS
.[facility]  [address] [facility]  [address]  [phone] phone     [phone] fax  Hours of Operation: Monday- Friday 8:00am- 5:00pm  After hours phone  [phone]  Hematology / Oncology Physicians on call    Dr. Varinder Salinas           Nurse Practitioners:     Lynn Rouse, ASHLEIGH Pineda, GATITO Poon, ASHLEIGH Orellana, ASHLEIGH Perrin, NP    Please don't hesitate to call if you have any concerns.      FALL PREVENTION   Falls often occur due to slipping, tripping or losing your balance. Here are ways to reduce your risk of falling again.   Was there anything that caused your fall that can be fixed, removed or replaced?   Make your home safe by keeping walkways clear of objects you may trip over.   Use non-slip pads under rugs.   Do not walk in poorly lit areas.   Do not stand on chairs or wobbly ladders.   Use caution when reaching overhead or looking upward. This position can cause a loss of balance.   Be sure your shoes fit properly, have non-slip bottoms and are in good condition.   Be cautious when going up and down stairs, curbs, and when walking on uneven sidewalks.   If your balance is poor, consider using a cane or walker.   If your fall was related to alcohol use, stop or limit alcohol intake.   If your fall was related to use of sleeping medicines, talk to your doctor about this. You may need to reduce your dosage at bedtime if you awaken during the night to go to the bathroom.   To reduce the need for nighttime bathroom trips:   Avoid drinking fluids for several hours before going to bed   Empty your bladder before going to bed   Men can keep a urinal at the bedside   © 8878-9144 [employer], [address] [place] [address], [place] [address]. All rights reserved. This information is not intended as a substitute for professional medical care. Always follow your healthcare  professional's instructions.    WAYS TO HELP PREVENT INFECTION        WASH YOUR HANDS OFTEN DURING THE DAY, ESPECIALLY BEFORE YOU EAT, AFTER USING THE BATHROOM, AND AFTER TOUCHING ANIMALS    STAY AWAY FROM PEOPLE WHO HAVE ILLNESSES YOU CAN CATCH; SUCH AS COLDS, FLU, CHICKEN POX    TRY TO AVOID CROWDS    STAY AWAY FROM CHILDREN WHO RECENTLY HAVE RECEIVED LIVE VIRUS VACCINES    MAINTAIN GOOD MOUTH CARE    DO NOT SQUEEZE OR SCRATCH PIMPLES    CLEAN CUTS & SCRAPES RIGHT AWAY AND DAILY UNTIL HEALED WITH WARM WATER, SOAP & AN ANTISEPTIC    AVOID CONTACT WITH LITTER BOXES, BIRD CAGES, & FISH TANKS    AVOID STANDING WATER, IE., BIRD BATHS, FLOWER POTS/VASES, OR HUMIDIFIERS    WEAR GLOVES WHEN GARDENING OR CLEANING UP AFTER OTHERS, ESPECIALLY BABIES & SMALL CHILDREN    DO NOT EAT RAW FISH, SEAFOOD, MEAT, OR EGGS

## 2024-02-23 NOTE — PROGRESS NOTES
VU sent applications for the [employer] and [employer] to [facility] [employer] for approval and processing.    Provider pre-printed instructions given

## 2024-02-24 ENCOUNTER — HOSPITAL ENCOUNTER (EMERGENCY)
Facility: HOSPITAL | Age: 69
Discharge: HOME | End: 2024-02-24
Attending: EMERGENCY MEDICINE
Payer: MEDICARE

## 2024-02-24 VITALS
DIASTOLIC BLOOD PRESSURE: 59 MMHG | HEART RATE: 77 BPM | RESPIRATION RATE: 20 BRPM | OXYGEN SATURATION: 96 % | BODY MASS INDEX: 40.76 KG/M2 | TEMPERATURE: 98 F | WEIGHT: 260.25 LBS | SYSTOLIC BLOOD PRESSURE: 123 MMHG

## 2024-02-24 DIAGNOSIS — R06.02 SHORTNESS OF BREATH: ICD-10-CM

## 2024-02-24 DIAGNOSIS — I50.9 CONGESTIVE HEART FAILURE, UNSPECIFIED HF CHRONICITY, UNSPECIFIED HEART FAILURE TYPE: Primary | ICD-10-CM

## 2024-02-24 LAB
ALBUMIN SERPL BCP-MCNC: 3.6 G/DL (ref 3.5–5.2)
ALP SERPL-CCNC: 118 U/L (ref 55–135)
ALT SERPL W/O P-5'-P-CCNC: 42 U/L (ref 10–44)
ANION GAP SERPL CALC-SCNC: 13 MMOL/L (ref 8–16)
AST SERPL-CCNC: 29 U/L (ref 10–40)
BASOPHILS # BLD AUTO: 0.01 K/UL (ref 0–0.2)
BASOPHILS NFR BLD: 0.2 % (ref 0–1.9)
BILIRUB SERPL-MCNC: 0.3 MG/DL (ref 0.1–1)
BNP SERPL-MCNC: 14 PG/ML (ref 0–99)
BUN SERPL-MCNC: 14 MG/DL (ref 8–23)
CALCIUM SERPL-MCNC: 9.4 MG/DL (ref 8.7–10.5)
CHLORIDE SERPL-SCNC: 107 MMOL/L (ref 95–110)
CO2 SERPL-SCNC: 26 MMOL/L (ref 23–29)
CREAT SERPL-MCNC: 0.8 MG/DL (ref 0.5–1.4)
DIFFERENTIAL METHOD BLD: ABNORMAL
EOSINOPHIL # BLD AUTO: 0 K/UL (ref 0–0.5)
EOSINOPHIL NFR BLD: 0.5 % (ref 0–8)
ERYTHROCYTE [DISTWIDTH] IN BLOOD BY AUTOMATED COUNT: 15.9 % (ref 11.5–14.5)
EST. GFR  (NO RACE VARIABLE): >60 ML/MIN/1.73 M^2
GLUCOSE SERPL-MCNC: 102 MG/DL (ref 70–110)
HCT VFR BLD AUTO: 30.6 % (ref 37–48.5)
HGB BLD-MCNC: 9.8 G/DL (ref 12–16)
IMM GRANULOCYTES # BLD AUTO: 0.02 K/UL (ref 0–0.04)
IMM GRANULOCYTES NFR BLD AUTO: 0.4 % (ref 0–0.5)
LACTATE SERPL-SCNC: 1.4 MMOL/L (ref 0.5–2.2)
LYMPHOCYTES # BLD AUTO: 2.3 K/UL (ref 1–4.8)
LYMPHOCYTES NFR BLD: 42.4 % (ref 18–48)
MCH RBC QN AUTO: 29.3 PG (ref 27–31)
MCHC RBC AUTO-ENTMCNC: 32 G/DL (ref 32–36)
MCV RBC AUTO: 91 FL (ref 82–98)
MONOCYTES # BLD AUTO: 0.4 K/UL (ref 0.3–1)
MONOCYTES NFR BLD: 7.6 % (ref 4–15)
NEUTROPHILS # BLD AUTO: 2.7 K/UL (ref 1.8–7.7)
NEUTROPHILS NFR BLD: 48.9 % (ref 38–73)
NRBC BLD-RTO: 0 /100 WBC
PLATELET # BLD AUTO: 175 K/UL (ref 150–450)
PMV BLD AUTO: 9.9 FL (ref 9.2–12.9)
POTASSIUM SERPL-SCNC: 3.3 MMOL/L (ref 3.5–5.1)
PROT SERPL-MCNC: 6.7 G/DL (ref 6–8.4)
RBC # BLD AUTO: 3.35 M/UL (ref 4–5.4)
SODIUM SERPL-SCNC: 146 MMOL/L (ref 136–145)
TROPONIN I SERPL DL<=0.01 NG/ML-MCNC: <0.006 NG/ML (ref 0–0.03)
WBC # BLD AUTO: 5.5 K/UL (ref 3.9–12.7)

## 2024-02-24 PROCEDURE — 83605 ASSAY OF LACTIC ACID: CPT | Performed by: NURSE PRACTITIONER

## 2024-02-24 PROCEDURE — 93005 ELECTROCARDIOGRAM TRACING: CPT

## 2024-02-24 PROCEDURE — 85025 COMPLETE CBC W/AUTO DIFF WBC: CPT | Performed by: NURSE PRACTITIONER

## 2024-02-24 PROCEDURE — 63600175 PHARM REV CODE 636 W HCPCS: Performed by: EMERGENCY MEDICINE

## 2024-02-24 PROCEDURE — 83880 ASSAY OF NATRIURETIC PEPTIDE: CPT | Performed by: NURSE PRACTITIONER

## 2024-02-24 PROCEDURE — 96374 THER/PROPH/DIAG INJ IV PUSH: CPT

## 2024-02-24 PROCEDURE — 84484 ASSAY OF TROPONIN QUANT: CPT | Performed by: NURSE PRACTITIONER

## 2024-02-24 PROCEDURE — 80053 COMPREHEN METABOLIC PANEL: CPT | Performed by: NURSE PRACTITIONER

## 2024-02-24 PROCEDURE — 99285 EMERGENCY DEPT VISIT HI MDM: CPT | Mod: 25

## 2024-02-24 PROCEDURE — 93010 ELECTROCARDIOGRAM REPORT: CPT | Mod: ,,, | Performed by: INTERNAL MEDICINE

## 2024-02-24 RX ORDER — FUROSEMIDE 40 MG/1
40 TABLET ORAL DAILY
Qty: 2 TABLET | Refills: 0 | Status: SHIPPED | OUTPATIENT
Start: 2024-02-24 | End: 2024-02-24

## 2024-02-24 RX ORDER — FUROSEMIDE 10 MG/ML
60 INJECTION INTRAMUSCULAR; INTRAVENOUS
Status: COMPLETED | OUTPATIENT
Start: 2024-02-24 | End: 2024-02-24

## 2024-02-24 RX ORDER — FUROSEMIDE 40 MG/1
40 TABLET ORAL DAILY
Qty: 2 TABLET | Refills: 0 | Status: SHIPPED | OUTPATIENT
Start: 2024-02-24 | End: 2024-02-26

## 2024-02-24 RX ADMIN — FUROSEMIDE 60 MG: 10 INJECTION, SOLUTION INTRAMUSCULAR; INTRAVENOUS at 09:02

## 2024-02-25 LAB
OHS QRS DURATION: 88 MS
OHS QTC CALCULATION: 454 MS

## 2024-02-25 NOTE — DISCHARGE INSTRUCTIONS
Has a mild volume overload secondary to congestive heart failure.  Please take all your home medications.  Take Lasix as prescribed.  Follow up with her doctor 1-2 days for re-evaluation return as needed for any worsening symptoms, problems, questions or concerns

## 2024-02-25 NOTE — ED PROVIDER NOTES
SCRIBE #1 NOTE: I, Justa Stoll, am scribing for, and in the presence of, Leoncio Hoffman Jr., MD. I have scribed the entire note.       History     Chief Complaint   Patient presents with    Shortness of Breath     SOB x1 week worse on exertion, swelling to left upper and lower extremetries; cancer hx, just completed chemo 1 week ago     Review of patient's allergies indicates:  No Known Allergies      History of Present Illness     HPI    2024, 9:20 PM  History obtained from the patient      History of Present Illness: Casie Gaines is a 68 y.o. female patient with a PMHx of HTN, CHF, Cancer who presents to the Emergency Department for evaluation of SOB with exertion on setting 1 week ago. She states she becomes winded when walking short distances.She also notices leg swelling during the same time period. She is not on any fluid pills at home Symptoms are constant and moderate in severity. No mitigating or exacerbating factors reported. Patient denies any fever, chills, cough, cp, weakness, and all other sxs at this time. No prior Tx reported. No further complaints or concerns at this time.       Arrival mode: Personal vehicle      PCP: Rossana Ang MD        Past Medical History:  Past Medical History:   Diagnosis Date    Anemia     Anxiety     Arthritis     knees    Cancer     ovarian    CHF (congestive heart failure)     Hx antineoplastic chemotherapy     last 2019    Hyperlipemia     Hypertension     Neck pain     Ovarian cancer 2019    CHEMO    Peritoneal carcinomatosis 2019       Past Surgical History:  Past Surgical History:   Procedure Laterality Date    breast reduction  10/02/2018    CATARACT EXTRACTION Bilateral     2023     SECTION      X 1    COLONOSCOPY N/A 2021    Procedure: COLONOSCOPY;  Surgeon: Paulette Rojas MD;  Location: [facility];  Service: Gastroenterology;  Laterality: N/A;    CYSTOSCOPY W/ URETERAL STENT PLACEMENT Right 2019     Procedure: CYSTOSCOPY, WITH URETERAL STENT INSERTION;  Surgeon: Timmy Santiago IV, MD;  Location: [facility] OR;  Service: Urology;  Laterality: Right;    CYSTOSCOPY W/ URETERAL STENT REMOVAL  10/04/2019    DILATION AND CURETTAGE OF UTERUS      HYSTERECTOMY      RALH for fibroids (still has ovaries)    INSERTION OF VENOUS ACCESS PORT Left 2019    Procedure: INSERTION, VENOUS ACCESS PORT;  Surgeon: Ulisses Monzon MD;  Location: [facility] OR;  Service: General;  Laterality: Left;  Left internal jugular     LYSIS OF ADHESIONS OF URETER N/A 08/15/2019    Procedure: URETEROLYSIS;  Surgeon: Ismael Juarez MD;  Location: [facility] OR;  Service: OB/GYN;  Laterality: N/A;    OMENTECTOMY N/A 08/15/2019    Procedure: OMENTECTOMY;  Surgeon: Ismael Juarez MD;  Location: [facility] OR;  Service: OB/GYN;  Laterality: N/A;    RETROGRADE PYELOGRAPHY Right 2019    Procedure: PYELOGRAM, RETROGRADE;  Surgeon: Timmy Santiago IV, MD;  Location: [facility] OR;  Service: Urology;  Laterality: Right;    ROBOT-ASSISTED LAPAROSCOPIC SALPINGO-OOPHORECTOMY USING DA TIA XI Bilateral 08/15/2019    Procedure: XI ROBOTIC SALPINGO-OOPHORECTOMY;  Surgeon: Ismael Juarez MD;  Location: [facility];  Service: OB/GYN;  Laterality: Bilateral;    TOTAL REDUCTION MAMMOPLASTY  2018    TUBAL LIGATION           Family History:  Family History   Problem Relation Age of Onset    Glaucoma Mother     Colon cancer Brother     Breast cancer Maternal Aunt     Breast cancer Paternal Aunt     Ovarian cancer Paternal Aunt     Anesthesia problems Other     Thrombophilia Neg Hx        Social History:  Social History     Tobacco Use    Smoking status: Former     Current packs/day: 0.00     Average packs/day: 1 pack/day for 25.0 years (25.0 ttl pk-yrs)     Types: Cigarettes     Start date: 10/1/1993     Quit date: 10/1/2018     Years since quittin.4    Smokeless tobacco: Never    Tobacco comments:     States started quit 2 months ago after 30 years   Substance and Sexual  Activity    Alcohol use: Yes     Comment: occasionally  No alcohol 72h prior to sx    Drug use: No    Sexual activity: Not Currently     Partners: Male     Comment: hyst; mut monog        Review of Systems     Review of Systems   Constitutional:  Negative for chills and fever.   HENT:  Negative for sore throat.    Respiratory:  Positive for shortness of breath. Negative for cough.    Cardiovascular:  Positive for leg swelling. Negative for chest pain.   Gastrointestinal:  Negative for nausea.   Genitourinary:  Negative for dysuria.   Musculoskeletal:  Negative for back pain.   Skin:  Negative for rash.   Neurological:  Negative for weakness.   Hematological:  Does not bruise/bleed easily.   All other systems reviewed and are negative.       Physical Exam     Initial Vitals [02/24/24 1900]   BP Pulse Resp Temp SpO2   122/60 88 (!) 22 98.7 °F (37.1 °C) 98 %      MAP       --          Physical Exam  Nursing Notes and Vital Signs Reviewed.  Constitutional: Patient is in no acute distress. Well-developed and well-nourished.  Head: Atraumatic. Normocephalic.  Eyes:  EOM intact.  No scleral icterus.  ENT: Mucous membranes are moist.  Nares clear   Neck:  Full ROM. No JVD.  Cardiovascular: Regular rate. Regular rhythm No murmurs, rubs, or gallops. Distal pulses are 2+ and symmetric  Pulmonary/Chest: No respiratory distress. Clear to auscultation bilaterally. No wheezing or rales.  Equal chest wall rise bilaterally  Abdominal: Soft and non-distended.  There is no tenderness.  No rebound, guarding, or rigidity. Good bowel sounds.  Genitourinary: No CVA tenderness.  No suprapubic tenderness  Musculoskeletal: Moves all extremities. No obvious deformities.  5 x 5 strength in all extremities   Skin: Warm and dry.  Neurological:  Alert, awake, and appropriate.  Normal speech.  No acute focal neurological deficits are appreciated.  Two through 12 intact bilaterally.  Psychiatric: Normal affect. Good eye contact. Appropriate in  content.       ED Course   Procedures  ED Vital Signs:  Vitals:    02/24/24 1900 02/24/24 2130   BP: 122/60 115/63   Pulse: 88 82   Resp: (!) 22 20   Temp: 98.7 °F (37.1 °C) 98.3 °F (36.8 °C)   TempSrc: Oral Oral   SpO2: 98% 97%   Weight: 118.1 kg (260 lb 4.1 oz)        Abnormal Lab Results:  Labs Reviewed   CBC W/ AUTO DIFFERENTIAL - Abnormal; Notable for the following components:       Result Value    RBC 3.35 (*)     Hemoglobin 9.8 (*)     Hematocrit 30.6 (*)     RDW 15.9 (*)     All other components within normal limits   COMPREHENSIVE METABOLIC PANEL - Abnormal; Notable for the following components:    Sodium 146 (*)     Potassium 3.3 (*)     All other components within normal limits   B-TYPE NATRIURETIC PEPTIDE   TROPONIN I   LACTIC ACID, PLASMA        All Lab Results:  Results for orders placed or performed during the hospital encounter of 02/24/24   CBC Auto Differential   Result Value Ref Range    WBC 5.50 3.90 - 12.70 K/uL    RBC 3.35 (L) 4.00 - 5.40 M/uL    Hemoglobin 9.8 (L) 12.0 - 16.0 g/dL    Hematocrit 30.6 (L) 37.0 - 48.5 %    MCV 91 82 - 98 fL    MCH 29.3 27.0 - 31.0 pg    MCHC 32.0 32.0 - 36.0 g/dL    RDW 15.9 (H) 11.5 - 14.5 %    Platelets 175 150 - 450 K/uL    MPV 9.9 9.2 - 12.9 fL    Immature Granulocytes 0.4 0.0 - 0.5 %    Gran # (ANC) 2.7 1.8 - 7.7 K/uL    Immature Grans (Abs) 0.02 0.00 - 0.04 K/uL    Lymph # 2.3 1.0 - 4.8 K/uL    Mono # 0.4 0.3 - 1.0 K/uL    Eos # 0.0 0.0 - 0.5 K/uL    Baso # 0.01 0.00 - 0.20 K/uL    nRBC 0 0 /100 WBC    Gran % 48.9 38.0 - 73.0 %    Lymph % 42.4 18.0 - 48.0 %    Mono % 7.6 4.0 - 15.0 %    Eosinophil % 0.5 0.0 - 8.0 %    Basophil % 0.2 0.0 - 1.9 %    Differential Method Automated    Comprehensive Metabolic Panel   Result Value Ref Range    Sodium 146 (H) 136 - 145 mmol/L    Potassium 3.3 (L) 3.5 - 5.1 mmol/L    Chloride 107 95 - 110 mmol/L    CO2 26 23 - 29 mmol/L    Glucose 102 70 - 110 mg/dL    BUN 14 8 - 23 mg/dL    Creatinine 0.8 0.5 - 1.4 mg/dL    Calcium  9.4 8.7 - 10.5 mg/dL    Total Protein 6.7 6.0 - 8.4 g/dL    Albumin 3.6 3.5 - 5.2 g/dL    Total Bilirubin 0.3 0.1 - 1.0 mg/dL    Alkaline Phosphatase 118 55 - 135 U/L    AST 29 10 - 40 U/L    ALT 42 10 - 44 U/L    eGFR >60 >60 mL/min/1.73 m^2    Anion Gap 13 8 - 16 mmol/L   Brain natriuretic peptide   Result Value Ref Range    BNP 14 0 - 99 pg/mL   Troponin I   Result Value Ref Range    Troponin I <0.006 0.000 - 0.026 ng/mL   Lactic acid, plasma   Result Value Ref Range    Lactate (Lactic Acid) 1.4 0.5 - 2.2 mmol/L     *Note: Due to a large number of results and/or encounters for the requested time period, some results have not been displayed. A complete set of results can be found in Results Review.         Imaging Results:  Imaging Results              X-Ray Chest 1 View (Final result)  Result time 02/24/24 19:19:34      Final result by Zay Scott MD (02/24/24 19:19:34)                   Impression:      As above.      Electronically signed by: Zay Scott  Date:    02/24/2024  Time:    19:19               Narrative:    EXAMINATION:  XR CHEST 1 VIEW    CLINICAL HISTORY:  shortness of breath;    TECHNIQUE:  Single frontal view of the chest was performed.    COMPARISON:  Multiple priors.    FINDINGS:  Left chest port catheter unchanged.    Subtle bibasilar increased attenuation concerning for edema but overall nonspecific.  No pleural fluid or pneumothorax.    The cardiac silhouette is normal in size. The hilar and mediastinal contours are unremarkable.    Bones are intact.                                       The EKG was ordered, reviewed, and independently interpreted by the ED provider.  Interpretation time: 19:04  Rate: 88 BPM  Rhythm: normal sinus rhythm  Interpretation: No acute ST changes. No STEMI.             The Emergency Provider reviewed the vital signs and test results, which are outlined above.     ED Discussion     10:21 PM: Reassessed pt at this time.  Discussed with pt all pertinent ED  information and results. Discussed pt dx and plan of tx. Gave pt all f/u and return to the ED instructions. All questions and concerns were addressed at this time. Pt expresses understanding of information and instructions, and is comfortable with plan to discharge. Pt is stable for discharge.    I discussed with patient and/or family/caretaker that evaluation in the ED does not suggest any emergent or life threatening medical conditions requiring immediate intervention beyond what was provided in the ED, and I believe patient is safe for discharge.  Regardless, an unremarkable evaluation in the ED does not preclude the development or presence of a serious of life threatening condition. As such, patient was instructed to return immediately for any worsening or change in current symptoms.      10:29 PM  Patient was had over 700 cc of urinary output.  She states she feels much better.  Clinically she has mild volume overload and congestive heart failure.  Will treat with Lasix at home for 2 days have follow up with the primary care provider 1-2 days for re-evaluation.  Patient verbalized agreement understanding with the plan of care seems reliable.  She is stable safe for discharge in my opinion     Medical Decision Making  Differential diagnoses:  CHF, volume overload, ovarian cancer, hypoalbuminemia    Patient was evaluated history and physical examination.  She has a history of congestive heart failure.  EKGs unremarkable from baseline today with a normal troponin normal BNP and a CBC showing a normal white count.  Lactate is normal her CMP shows only a mild decrease in potassium 3.3.  Chest x-ray shows pulmonary edema however.  Patient was not febrile this is not coughing.  Clinically this is likely congestive heart failure O2 with Lasix.  She felt much better with IV Lasix he was requesting discharge home.  She diuresed 700 cc and has significant bilateral pedal edema.  She did have a history of the left leg  being a bit bigger than the right but is already been ultrasounded and this was negative.  Patient was stable safe for discharge in my opinion.    Amount and/or Complexity of Data Reviewed  External Data Reviewed: labs, radiology, ECG and notes.     Details: Ultrasound done 11923- for DVT.  She notes that her symptoms have been continuous and unchanged since that time it was no further evidence of concern for new DVT as there has been no resolution  Labs: ordered. Decision-making details documented in ED Course.  Radiology: ordered. Decision-making details documented in ED Course.  ECG/medicine tests: ordered and independent interpretation performed. Decision-making details documented in ED Course.    Risk  OTC drugs.  Prescription drug management.  Drug therapy requiring intensive monitoring for toxicity.  Decision regarding hospitalization.  Diagnosis or treatment significantly limited by social determinants of health.  Risk Details: Social determinants include current diagnosis of ovarian cancer                ED Medication(s):  Medications   furosemide injection 60 mg (60 mg Intravenous Given by Other 2/24/24 2140)       New Prescriptions    FUROSEMIDE (LASIX) 40 MG TABLET    Take 1 tablet (40 mg total) by mouth once daily. for 2 days        Follow-up Information       Rossana Ang MD.    Specialty: Family Medicine  Contact information:  [address] [facility] [address] [phone]                                 Scribe Attestation:   Scribe #1: I performed the above scribed service and the documentation accurately describes the services I performed. I attest to the accuracy of the note.     Attending:   Physician Attestation Statement for Scribe #1: I, Leoncio Hoffman Jr., MD, personally performed the services described in this documentation, as scribed by Justa Stoll, in my presence, and it is both accurate and complete.           Clinical Impression       ICD-10-CM ICD-9-CM    1. Congestive heart failure, unspecified HF chronicity, unspecified heart failure type  I50.9 428.0   2. Shortness of breath  R06.02 786.05       Disposition:   Disposition: Discharged  Condition: Stable         Leoncio Hoffman Jr., MD  02/24/24 9572

## 2024-02-25 NOTE — FIRST PROVIDER EVALUATION
Medical screening examination initiated.  I have conducted a focused provider triage encounter, findings are as follows:    Brief history of present illness:  Patient presents the ER for shortness of breath and leg swelling    There were no vitals filed for this visit.    Pertinent physical exam:  No acute distress    Brief workup plan:  Labs, EKG, imaging, further eval    Preliminary workup initiated; this workup will be continued and followed by the physician or advanced practice provider that is assigned to the patient when roomed.

## 2024-02-26 ENCOUNTER — HOSPITAL ENCOUNTER (OUTPATIENT)
Dept: RADIOLOGY | Facility: HOSPITAL | Age: 69
Discharge: HOME | End: 2024-02-26
Attending: INTERNAL MEDICINE
Payer: MEDICARE

## 2024-02-26 ENCOUNTER — OFFICE VISIT (OUTPATIENT)
Dept: INTERNAL MEDICINE | Facility: CLINIC | Age: 69
End: 2024-02-26
Payer: MEDICARE

## 2024-02-26 ENCOUNTER — HOSPITAL ENCOUNTER (OUTPATIENT)
Dept: CARDIOLOGY | Facility: HOSPITAL | Age: 69
Discharge: HOME | End: 2024-02-26
Attending: FAMILY MEDICINE
Payer: MEDICARE

## 2024-02-26 VITALS
HEIGHT: 67 IN | DIASTOLIC BLOOD PRESSURE: 68 MMHG | BODY MASS INDEX: 40.65 KG/M2 | SYSTOLIC BLOOD PRESSURE: 110 MMHG | WEIGHT: 259 LBS

## 2024-02-26 VITALS
OXYGEN SATURATION: 96 % | DIASTOLIC BLOOD PRESSURE: 68 MMHG | HEIGHT: 67 IN | WEIGHT: 259.25 LBS | SYSTOLIC BLOOD PRESSURE: 110 MMHG | BODY MASS INDEX: 40.69 KG/M2 | HEART RATE: 81 BPM

## 2024-02-26 DIAGNOSIS — I50.9 CONGESTIVE HEART FAILURE, UNSPECIFIED HF CHRONICITY, UNSPECIFIED HEART FAILURE TYPE: ICD-10-CM

## 2024-02-26 DIAGNOSIS — T45.1X5A IMMUNODEFICIENCY DUE TO CHEMOTHERAPY: ICD-10-CM

## 2024-02-26 DIAGNOSIS — C56.3 OVARIAN CANCER, BILATERAL: ICD-10-CM

## 2024-02-26 DIAGNOSIS — Z79.899 IMMUNODEFICIENCY DUE TO CHEMOTHERAPY: ICD-10-CM

## 2024-02-26 DIAGNOSIS — E66.01 MORBID OBESITY: ICD-10-CM

## 2024-02-26 DIAGNOSIS — D84.821 IMMUNODEFICIENCY DUE TO CHEMOTHERAPY: ICD-10-CM

## 2024-02-26 DIAGNOSIS — I50.9 CONGESTIVE HEART FAILURE, UNSPECIFIED HF CHRONICITY, UNSPECIFIED HEART FAILURE TYPE: Primary | ICD-10-CM

## 2024-02-26 DIAGNOSIS — I10 ESSENTIAL HYPERTENSION: ICD-10-CM

## 2024-02-26 LAB
AORTIC ROOT ANNULUS: 3.06 CM
AV INDEX (PROSTH): 0.88
AV MEAN GRADIENT: 5 MMHG
AV PEAK GRADIENT: 8 MMHG
AV VALVE AREA BY VELOCITY RATIO: 3.71 CM²
AV VALVE AREA: 3.42 CM²
AV VELOCITY RATIO: 0.95
BSA FOR ECHO PROCEDURE: 2.36 M2
CV ECHO LV RWT: 0.49 CM
DOP CALC AO PEAK VEL: 1.38 M/S
DOP CALC AO VTI: 31.4 CM
DOP CALC LVOT AREA: 3.9 CM2
DOP CALC LVOT DIAMETER: 2.23 CM
DOP CALC LVOT PEAK VEL: 1.31 M/S
DOP CALC LVOT STROKE VOLUME: 107.35 CM3
DOP CALC RVOT PEAK VEL: 0.9 M/S
DOP CALC RVOT VTI: 19.8 CM
DOP CALCLVOT PEAK VEL VTI: 27.5 CM
E WAVE DECELERATION TIME: 148.71 MSEC
E/A RATIO: 0.96
E/E' RATIO: 10.32 M/S
ECHO LV POSTERIOR WALL: 1.22 CM (ref 0.6–1.1)
FRACTIONAL SHORTENING: 45 % (ref 28–44)
INTERVENTRICULAR SEPTUM: 1.26 CM (ref 0.6–1.1)
IVC DIAMETER: 1.36 CM
IVRT: 74.22 MSEC
LA MAJOR: 5.8 CM
LA MINOR: 4.73 CM
LA WIDTH: 4.3 CM
LEFT ATRIUM SIZE: 3.59 CM
LEFT ATRIUM VOLUME INDEX: 30.3 ML/M2
LEFT ATRIUM VOLUME: 68.37 CM3
LEFT INTERNAL DIMENSION IN SYSTOLE: 2.73 CM (ref 2.1–4)
LEFT VENTRICLE DIASTOLIC VOLUME INDEX: 51.74 ML/M2
LEFT VENTRICLE DIASTOLIC VOLUME: 116.93 ML
LEFT VENTRICLE MASS INDEX: 108 G/M2
LEFT VENTRICLE SYSTOLIC VOLUME INDEX: 12.3 ML/M2
LEFT VENTRICLE SYSTOLIC VOLUME: 27.88 ML
LEFT VENTRICULAR INTERNAL DIMENSION IN DIASTOLE: 4.98 CM (ref 3.5–6)
LEFT VENTRICULAR MASS: 243.26 G
LV LATERAL E/E' RATIO: 8.91 M/S
LV SEPTAL E/E' RATIO: 12.25 M/S
LVOT MG: 4.08 MMHG
LVOT MV: 0.97 CM/S
MV PEAK A VEL: 1.02 M/S
MV PEAK E VEL: 0.98 M/S
PULM VEIN S/D RATIO: 1.94
PV MEAN GRADIENT: 2 MMHG
PV MV: 0.7 M/S
PV PEAK D VEL: 0.35 M/S
PV PEAK GRADIENT: 4 MMHG
PV PEAK S VEL: 0.68 M/S
PV PEAK VELOCITY: 1.04 M/S
RA MAJOR: 5.21 CM
RA PRESSURE ESTIMATED: 3 MMHG
RA WIDTH: 3.5 CM
RIGHT VENTRICULAR END-DIASTOLIC DIMENSION: 3.81 CM
SINUS: 3.05 CM
STJ: 3.07 CM
TDI LATERAL: 0.11 M/S
TDI SEPTAL: 0.08 M/S
TDI: 0.1 M/S
Z-SCORE OF LEFT VENTRICULAR DIMENSION IN END DIASTOLE: -5.1
Z-SCORE OF LEFT VENTRICULAR DIMENSION IN END SYSTOLE: -4.81

## 2024-02-26 PROCEDURE — 99999 PR PBB SHADOW E&M-EST. PATIENT-LVL IV: CPT | Mod: PBBFAC,,, | Performed by: FAMILY MEDICINE

## 2024-02-26 PROCEDURE — 78815 PET IMAGE W/CT SKULL-THIGH: CPT | Mod: 26,PS,, | Performed by: RADIOLOGY

## 2024-02-26 PROCEDURE — 93306 TTE W/DOPPLER COMPLETE: CPT | Mod: 26,,, | Performed by: INTERNAL MEDICINE

## 2024-02-26 PROCEDURE — 93306 TTE W/DOPPLER COMPLETE: CPT

## 2024-02-26 PROCEDURE — 3008F BODY MASS INDEX DOCD: CPT | Mod: CPTII,S$GLB,, | Performed by: FAMILY MEDICINE

## 2024-02-26 PROCEDURE — 1125F AMNT PAIN NOTED PAIN PRSNT: CPT | Mod: CPTII,S$GLB,, | Performed by: FAMILY MEDICINE

## 2024-02-26 PROCEDURE — G2211 COMPLEX E/M VISIT ADD ON: HCPCS | Mod: S$GLB,,, | Performed by: FAMILY MEDICINE

## 2024-02-26 PROCEDURE — 3074F SYST BP LT 130 MM HG: CPT | Mod: CPTII,S$GLB,, | Performed by: FAMILY MEDICINE

## 2024-02-26 PROCEDURE — A9552 F18 FDG: HCPCS | Performed by: INTERNAL MEDICINE

## 2024-02-26 PROCEDURE — 3078F DIAST BP <80 MM HG: CPT | Mod: CPTII,S$GLB,, | Performed by: FAMILY MEDICINE

## 2024-02-26 PROCEDURE — 78815 PET IMAGE W/CT SKULL-THIGH: CPT | Mod: TC

## 2024-02-26 PROCEDURE — 99214 OFFICE O/P EST MOD 30 MIN: CPT | Mod: S$GLB,,, | Performed by: FAMILY MEDICINE

## 2024-02-26 PROCEDURE — 1159F MED LIST DOCD IN RCRD: CPT | Mod: CPTII,S$GLB,, | Performed by: FAMILY MEDICINE

## 2024-02-26 RX ORDER — FUROSEMIDE 40 MG/1
40 TABLET ORAL DAILY
Qty: 30 TABLET | Refills: 11 | Status: SHIPPED | OUTPATIENT
Start: 2024-02-26 | End: 2025-02-25

## 2024-02-26 RX ORDER — FLUDEOXYGLUCOSE F18 500 MCI/ML
10.33 INJECTION INTRAVENOUS
Status: COMPLETED | OUTPATIENT
Start: 2024-02-26 | End: 2024-02-26

## 2024-02-26 RX ORDER — OLMESARTAN MEDOXOMIL AND HYDROCHLOROTHIAZIDE 40/25 40; 25 MG/1; MG/1
1 TABLET ORAL DAILY
Qty: 90 TABLET | Refills: 1 | Status: SHIPPED | OUTPATIENT
Start: 2024-02-26 | End: 2025-02-25

## 2024-02-26 RX ADMIN — FLUDEOXYGLUCOSE F-18 10.33 MILLICURIE: 500 INJECTION INTRAVENOUS at 08:02

## 2024-02-26 NOTE — PROGRESS NOTES
Casie Frias Givens  02/26/2024  [id number]    Rossana Ang MD  Patient Care Team:  Rossana Ang MD as PCP - General (Family Medicine)  Radha Foley LPN as Care Coordinator (Internal Medicine)  Rita Sawant, PharmD as Pharmacist (Oncology)  Gerri Meyers RD (Inactive) as Dietitian (Nutrition)  Jessica Brink PharmD as Hypertension Digital Medicine Clinician (Pharmacist)  Rossana Ang MD as Hypertension Digital Medicine Responsible Provider (Family Medicine)  Madelaine Kraus as Digital Medicine Health           Visit Type: ER follow up    Chief Complaint:  Chief Complaint   Patient presents with    Hospital Follow Up       History of Present Illness:  Went to ER for SOB   EXAMINATION:  XR CHEST 1 VIEW     CLINICAL HISTORY:  shortness of breath;     TECHNIQUE:  Single frontal view of the chest was performed.     COMPARISON:  Multiple priors.     FINDINGS:  Left chest port catheter unchanged.     Subtle bibasilar increased attenuation concerning for edema but overall nonspecific.  No pleural fluid or pneumothorax.     The cardiac silhouette is normal in size. The hilar and mediastinal contours are unremarkable.     Bones are intact.    Last visit with Cards was in 2019  Last echo was in 2019,   EKG in ER read as noraml  CONCLUSIONS     1 - Concentric hypertrophy.     2 - No wall motion abnormalities.     3 - Normal left ventricular systolic function (EF 55-60%).     4 - Normal left ventricular diastolic function.     5 - Normal right ventricular systolic function .     6 - Pulmonary hypertension. The estimated PA systolic pressure is 52 mmHg.     7 - Trivial tricuspid regurgitation.     Er Gave IV lasix.  Diuresed 700 cc  Felt better.  Pulse ox 96%   BNP was not elevated    Recurrent Ovairan Ca, peritoneal Carinomatosis.  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  Patient was then treated with Avastin maintenance  2023 CT CAP: Marked interval disease progression with severe peritoneal carcinomatosis as detailed above.  2023 CT CAP: No CT evidence of recurrent or metastatic disease in the chest, abdomen or pelvis.  : 7 >> 21 >> 32 >> 64 >> 11  Started Carbo/Taxol 2023  C1 no complications  C2 and C3 (11/10/23): reaction consisting of feeling presyncopal, SOB, sweating; vitals significant for hypoxia and hypotension; evaluated by AI and recs for desensitization  Per medical record, patient declined treatment until after the new year  CT CAP 23 showed CLYDE  C4 -C6with desensitization  Continue follow up with Gyn Onc  We will consider maintenance therapy after repeat literature review and discussion with our clinical pharmacist if post treatment PET-CT clear  PET DONE TODAY        Chemotherapy Induced Neuropathy  Intermittent neuropathy in feet  Continue to monitor        Left Groin Pain  Only experiences when bladder full  Will obtain UA and ultrasound of the groin; she may need early gyn exam           Chronic Medical Conditions  Osteoarthritis  HTN    History:  Past Medical History:   Diagnosis Date    Anemia     Anxiety     Arthritis     knees    Cancer     ovarian    CHF (congestive heart failure)     Hx antineoplastic chemotherapy     last 2019    Hyperlipemia     Hypertension     Neck pain     Ovarian cancer 2019    CHEMO    Peritoneal carcinomatosis 2019     Past Surgical History:   Procedure Laterality Date    breast reduction  10/02/2018    CATARACT EXTRACTION Bilateral     2023     SECTION      X 1    COLONOSCOPY N/A 2021    Procedure: COLONOSCOPY;  Surgeon: Paulette Rojas MD;  Location: [facility];  Service: Gastroenterology;   Laterality: N/A;    CYSTOSCOPY W/ URETERAL STENT PLACEMENT Right 2019    Procedure: CYSTOSCOPY, WITH URETERAL STENT INSERTION;  Surgeon: Timmy Santiago IV, MD;  Location: [facility] OR;  Service: Urology;  Laterality: Right;    CYSTOSCOPY W/ URETERAL STENT REMOVAL  10/04/2019    DILATION AND CURETTAGE OF UTERUS      HYSTERECTOMY      RALH for fibroids (still has ovaries)    INSERTION OF VENOUS ACCESS PORT Left 2019    Procedure: INSERTION, VENOUS ACCESS PORT;  Surgeon: Ulisses Monzon MD;  Location: [facility] OR;  Service: General;  Laterality: Left;  Left internal jugular     LYSIS OF ADHESIONS OF URETER N/A 08/15/2019    Procedure: URETEROLYSIS;  Surgeon: Ismael Juarez MD;  Location: [facility] OR;  Service: OB/GYN;  Laterality: N/A;    OMENTECTOMY N/A 08/15/2019    Procedure: OMENTECTOMY;  Surgeon: Ismael Juarez MD;  Location: [facility] OR;  Service: OB/GYN;  Laterality: N/A;    RETROGRADE PYELOGRAPHY Right 2019    Procedure: PYELOGRAM, RETROGRADE;  Surgeon: Timmy Santiago IV, MD;  Location: [facility] OR;  Service: Urology;  Laterality: Right;    ROBOT-ASSISTED LAPAROSCOPIC SALPINGO-OOPHORECTOMY USING DA TIA XI Bilateral 08/15/2019    Procedure: XI ROBOTIC SALPINGO-OOPHORECTOMY;  Surgeon: Ismael Juarez MD;  Location: [facility];  Service: OB/GYN;  Laterality: Bilateral;    TOTAL REDUCTION MAMMOPLASTY  2018    TUBAL LIGATION       Family History   Problem Relation Age of Onset    Glaucoma Mother     Colon cancer Brother     Breast cancer Maternal Aunt     Breast cancer Paternal Aunt     Ovarian cancer Paternal Aunt     Anesthesia problems Other     Thrombophilia Neg Hx      Social History     Socioeconomic History    Marital status:    Tobacco Use    Smoking status: Former     Current packs/day: 0.00     Average packs/day: 1 pack/day for 25.0 years (25.0 ttl pk-yrs)     Types: Cigarettes     Start date: 10/1/1993     Quit date: 10/1/2018     Years since quittin.4    Smokeless tobacco: Never    Tobacco  comments:     States started quit 2 months ago after 30 years   Substance and Sexual Activity    Alcohol use: Yes     Comment: occasionally  No alcohol 72h prior to sx    Drug use: No    Sexual activity: Not Currently     Partners: Male     Comment: hyst; mut monog   Social History Narrative    Live w/ spouse and 2 dogs      Social Determinants of Health     Financial Resource Strain: Low Risk  (11/30/2023)    Overall Financial Resource Strain (CARDIA)     Difficulty of Paying Living Expenses: Not hard at all   Food Insecurity: No Food Insecurity (11/30/2023)    Hunger Vital Sign     Worried About Running Out of Food in the Last Year: Never true     Ran Out of Food in the Last Year: Never true   Transportation Needs: No Transportation Needs (11/30/2023)    PRAPARE - Transportation     Lack of Transportation (Medical): No     Lack of Transportation (Non-Medical): No   Physical Activity: Unknown (11/30/2023)    Exercise Vital Sign     Days of Exercise per Week: 0 days   Stress: No Stress Concern Present (11/30/2023)    [employer] [place] [employer] - Occupational Stress Questionnaire     Feeling of Stress : Not at all   Social Connections: Unknown (11/30/2023)    Social Connection and Isolation Panel [NHANES]     Frequency of Communication with Friends and Family: More than three times a week     Frequency of Social Gatherings with Friends and Family: More than three times a week     Active Member of Clubs or Organizations: Yes     Attends Club or Organization Meetings: More than 4 times per year     Marital Status:    Housing Stability: Unknown (11/30/2023)    Housing Stability Vital Sign     Unable to Pay for Housing in the Last Year: No     Unstable Housing in the Last Year: No     Patient Active Problem List   Diagnosis    Hypertension    Osteoarthritis of both knees    History of CHF (congestive heart failure)    Hyperlipidemia    Peritoneal carcinomatosis    Morbid obesity    Status post  placement of ureteral stent    Abnormal ECG    Pulmonary HTN    Ovarian cancer, bilateral    S/P BSO (bilateral salpingo-oophorectomy)    Encounter for antineoplastic chemotherapy    Anxiety    DDD (degenerative disc disease), cervical    Family history of colon cancer    Acute right-sided thoracic back pain    Acute right-sided low back pain without sciatica    Sciatic nerve pain    Vasovagal reaction    Drug reaction    Infusion reaction    Left groin pain    Pruritus     Review of patient's allergies indicates:  No Known Allergies    The following were reviewed at this visit: active problem list, medication list, allergies, family history, social history, and health maintenance.    Medications:  Current Outpatient Medications on File Prior to Visit   Medication Sig Dispense Refill    albuterol 90 mcg/actuation inhaler Inhale 2 puffs into the lungs every 4 (four) hours as needed for Wheezing. Rescue 18 g 0    ALPRAZolam (XANAX) 0.25 MG tablet Take 1 tablet (0.25 mg total) by mouth 3 (three) times daily as needed for Anxiety. 60 tablet 2    amLODIPine (NORVASC) 5 MG tablet Take 2 tablets (10 mg total) by mouth once daily. 180 tablet 3    atenoloL (TENORMIN) 25 MG tablet Take 1 tablet (25 mg total) by mouth once daily. 90 tablet 2    biotin 1 mg tablet Take 1,000 mcg by mouth once daily.       dexAMETHasone (DECADRON) 4 MG Tab Take 2 tablets (8 mg total) by mouth once daily. Take as directed on days 2, 3 and 4 of your chemotherapy cycle. 6 tablet 11    diclofenac sodium (VOLTAREN) 1 % Gel Apply once a day to back as needed 100 g 1    fluticasone propionate (FLONASE) 50 mcg/actuation nasal spray 1 spray (50 mcg total) by Each Nostril route every 12 (twelve) hours as needed for Rhinitis. 16 g 0    ginkgo biloba 40 mg Tab Take 40 mg by mouth.      montelukast (SINGULAIR) 10 mg tablet Take 1 tablet (10 mg total) by mouth every evening. 30 tablet 1    multivitamin with minerals tablet Take 1 tablet by mouth once daily.        OLANZapine (ZYPREXA) 5 MG tablet Take 1 tablet (5 mg total) by mouth every evening on days 1 through 4 of your chemotherapy regimen. 4 tablet 11    olmesartan-hydrochlorothiazide (BENICAR HCT) 40-25 mg per tablet Take 1 tablet by mouth once daily. 90 tablet 1    ondansetron (ZOFRAN-ODT) 8 MG TbDL Dissolve 1 tablet (8 mg total) under the tongue every 6 (six) hours as needed. 30 tablet 11    pantoprazole (PROTONIX) 40 MG tablet Take 1 tablet (40 mg total) by mouth once daily. 90 tablet 1    prochlorperazine (COMPAZINE) 5 MG tablet Take 2 tablets (10 mg total) by mouth every 6 (six) hours as needed (nausea or vomiting). 30 tablet 11    rosuvastatin (CRESTOR) 10 MG tablet Take 1 tablet (10 mg total) by mouth once daily. 90 tablet 1    sertraline (ZOLOFT) 25 MG tablet Take 1 tablet (25 mg total) by mouth once daily. 90 tablet 0    zinc gluconate 50 mg tablet Take 50 mg by mouth once daily.      azelastine (ASTELIN) 137 mcg (0.1 %) nasal spray 1 spray (137 mcg total) by Nasal route 2 (two) times daily. 30 mL 0    cetirizine (ZYRTEC) 10 MG tablet Take 1 tablet (10 mg total) by mouth once daily. 30 tablet 5    EPINEPHrine (EPIPEN) 0.3 mg/0.3 mL AtIn Inject 0.3 mLs (0.3 mg total) into the muscle once. for 1 dose 2 each 0    furosemide (LASIX) 40 MG tablet Take 1 tablet (40 mg total) by mouth once daily. for 2 days (Patient not taking: Reported on 2/26/2024) 2 tablet 0    gabapentin (NEURONTIN) 100 MG capsule Take 1 capsule (100 mg total) by mouth 3 (three) times daily. 90 capsule 11     Current Facility-Administered Medications on File Prior to Visit   Medication Dose Route Frequency Provider Last Rate Last Admin    [COMPLETED] fludeoxyglucose F-18 injection 10.3 millicurie  10.3 millicurie Intravenous ONCE PRN Torri House MD   10.3 millicurie at 02/26/24 0830       Medications have been reviewed and reconciled with patient at this visit.  Barriers to medications reviewed with patient.    Adverse reactions to  current medications reviewed with patient..    Over the counter medications reviewed and reconciled with patient.    Exam:  Wt Readings from Last 3 Encounters:   02/26/24 117.6 kg (259 lb 4.2 oz)   02/24/24 118.1 kg (260 lb 4.1 oz)   02/14/24 115.4 kg (254 lb 4.8 oz)     Temp Readings from Last 3 Encounters:   02/24/24 98.4 °F (36.9 °C) (Oral)   02/14/24 97.5 °F (36.4 °C)   02/14/24 97.7 °F (36.5 °C) (Temporal)     BP Readings from Last 3 Encounters:   02/26/24 110/68   02/24/24 (!) 123/59   02/14/24 (!) 146/72     Pulse Readings from Last 3 Encounters:   02/26/24 81   02/24/24 77   02/14/24 86     Body mass index is 40.61 kg/m².      Review of Systems   Respiratory:  Positive for shortness of breath. Negative for sputum production.    Cardiovascular:  Positive for leg swelling.     Physical Exam  Nursing note reviewed.   Cardiovascular:      Rate and Rhythm: Normal rate and regular rhythm.   Pulmonary:      Effort: Pulmonary effort is normal. No respiratory distress.   Neurological:      Mental Status: She is alert and oriented to person, place, and time.   Psychiatric:         Mood and Affect: Mood normal.         Behavior: Behavior normal.         Thought Content: Thought content normal.         Judgment: Judgment normal.         Laboratory Reviewed ({Yes)  Lab Results   Component Value Date    WBC 5.50 02/24/2024    HGB 9.8 (L) 02/24/2024    HCT 30.6 (L) 02/24/2024     02/24/2024    CHOL 163 01/06/2022    TRIG 71 01/06/2022    HDL 51 01/06/2022    ALT 42 02/24/2024    AST 29 02/24/2024     (H) 02/24/2024    K 3.3 (L) 02/24/2024     02/24/2024    CREATININE 0.8 02/24/2024    BUN 14 02/24/2024    CO2 26 02/24/2024    TSH 2.460 09/20/2023    INR 1.0 01/28/2019    HGBA1C 5.8 (H) 06/12/2023       Casie was seen today for hospital follow up.    Diagnoses and all orders for this visit:    Congestive heart failure, unspecified HF chronicity, unspecified heart failure type  -     Ambulatory  referral/consult to Cardiology; Future  -     Echo; Future  -     Basic Metabolic Panel; Future  -     Magnesium; Future    Morbid obesity    Other orders  -     furosemide (LASIX) 40 MG tablet; Take 1 tablet (40 mg total) by mouth once daily. (Patient not taking: Reported on 2/26/2024)      Responded well to lasix  Will continue  Repeat Echo  Get back with Cards  Will help coordinate appt    Follow up GYN  Daily weights    Check Potassium, magnesium was low  Probably need Rx for supplements   Labs today          I have evaluated and discussed management associated with medical care services that serve as the continuing focal point for all needed health care services and/or with medical care services that are part of ongoing care related to my patients single, serious condition or a complex condition.     I am providing ongoing care and I am the primary care provider for this patient, and they are being managed, monitored, and/or observed for their chronic conditions over time.     I have addressed their ongoing health maintenance requirements and needs for all health care services and reviewed co-management plans provided by speciality providers when available.      Care Plan/Goals: Reviewed    Goals          Patient Stated      Blood Pressure < 130/80 (pt-stated)        Other      Exercise at least 150 minutes per week.       Maintain a low sodium diet       American Heart Association (AHA) guidelines recommend less than 1500 mg of dietary salt per day.        Take at least one BP reading per week at various times of the day             Follow up: No follow-ups on file.    After visit summary was printed and given to patient upon discharge today.  Patient goals and care plan are included in After Visit Summary.

## 2024-02-27 ENCOUNTER — OFFICE VISIT (OUTPATIENT)
Dept: URGENT CARE | Facility: CLINIC | Age: 69
End: 2024-02-27
Payer: MEDICARE

## 2024-02-27 VITALS
RESPIRATION RATE: 18 BRPM | TEMPERATURE: 98 F | OXYGEN SATURATION: 97 % | SYSTOLIC BLOOD PRESSURE: 113 MMHG | DIASTOLIC BLOOD PRESSURE: 65 MMHG | HEIGHT: 67 IN | WEIGHT: 257.94 LBS | BODY MASS INDEX: 40.48 KG/M2 | HEART RATE: 89 BPM

## 2024-02-27 DIAGNOSIS — R05.9 COUGH, UNSPECIFIED TYPE: Primary | ICD-10-CM

## 2024-02-27 LAB
CTP QC/QA: YES
SARS-COV-2 AG RESP QL IA.RAPID: NEGATIVE

## 2024-02-27 PROCEDURE — 87811 SARS-COV-2 COVID19 W/OPTIC: CPT | Mod: QW,S$GLB,, | Performed by: NURSE PRACTITIONER

## 2024-02-27 PROCEDURE — 99214 OFFICE O/P EST MOD 30 MIN: CPT | Mod: S$GLB,,, | Performed by: NURSE PRACTITIONER

## 2024-02-27 RX ORDER — LANOLIN ALCOHOL/MO/W.PET/CERES
400 CREAM (GRAM) TOPICAL DAILY
Qty: 1 TABLET | Refills: 0 | Status: ON HOLD | OUTPATIENT
Start: 2024-02-27 | End: 2024-05-20 | Stop reason: HOSPADM

## 2024-02-27 RX ORDER — POTASSIUM CHLORIDE 20 MEQ/1
20 TABLET, EXTENDED RELEASE ORAL 2 TIMES DAILY
Qty: 2 TABLET | Refills: 0 | Status: SHIPPED | OUTPATIENT
Start: 2024-02-27

## 2024-02-27 RX ORDER — BENZONATATE 200 MG/1
200 CAPSULE ORAL 3 TIMES DAILY PRN
Qty: 25 CAPSULE | Refills: 0 | Status: SHIPPED | OUTPATIENT
Start: 2024-02-27 | End: 2024-03-08

## 2024-02-28 ENCOUNTER — OFFICE VISIT (OUTPATIENT)
Dept: CARDIOLOGY | Facility: CLINIC | Age: 69
End: 2024-02-28
Payer: MEDICARE

## 2024-02-28 VITALS
WEIGHT: 260.81 LBS | HEART RATE: 85 BPM | OXYGEN SATURATION: 98 % | DIASTOLIC BLOOD PRESSURE: 62 MMHG | SYSTOLIC BLOOD PRESSURE: 122 MMHG | HEIGHT: 67 IN | BODY MASS INDEX: 40.93 KG/M2

## 2024-02-28 DIAGNOSIS — R94.31 ABNORMAL ECG: ICD-10-CM

## 2024-02-28 DIAGNOSIS — I50.9 CONGESTIVE HEART FAILURE, UNSPECIFIED HF CHRONICITY, UNSPECIFIED HEART FAILURE TYPE: ICD-10-CM

## 2024-02-28 DIAGNOSIS — M50.30 DDD (DEGENERATIVE DISC DISEASE), CERVICAL: ICD-10-CM

## 2024-02-28 DIAGNOSIS — E78.2 MIXED HYPERLIPIDEMIA: ICD-10-CM

## 2024-02-28 DIAGNOSIS — R06.02 SOB (SHORTNESS OF BREATH): Primary | ICD-10-CM

## 2024-02-28 DIAGNOSIS — C56.3 OVARIAN CANCER, BILATERAL: ICD-10-CM

## 2024-02-28 DIAGNOSIS — I10 PRIMARY HYPERTENSION: ICD-10-CM

## 2024-02-28 DIAGNOSIS — R22.42 LOCALIZED SWELLING OF LEFT LOWER LEG: ICD-10-CM

## 2024-02-28 DIAGNOSIS — E66.01 MORBID OBESITY WITH BMI OF 40.0-44.9, ADULT: ICD-10-CM

## 2024-02-28 PROCEDURE — 99204 OFFICE O/P NEW MOD 45 MIN: CPT | Mod: S$GLB,,, | Performed by: STUDENT IN AN ORGANIZED HEALTH CARE EDUCATION/TRAINING PROGRAM

## 2024-02-28 PROCEDURE — 3078F DIAST BP <80 MM HG: CPT | Mod: CPTII,S$GLB,, | Performed by: STUDENT IN AN ORGANIZED HEALTH CARE EDUCATION/TRAINING PROGRAM

## 2024-02-28 PROCEDURE — 3074F SYST BP LT 130 MM HG: CPT | Mod: CPTII,S$GLB,, | Performed by: STUDENT IN AN ORGANIZED HEALTH CARE EDUCATION/TRAINING PROGRAM

## 2024-02-28 PROCEDURE — 3008F BODY MASS INDEX DOCD: CPT | Mod: CPTII,S$GLB,, | Performed by: STUDENT IN AN ORGANIZED HEALTH CARE EDUCATION/TRAINING PROGRAM

## 2024-02-28 PROCEDURE — 3288F FALL RISK ASSESSMENT DOCD: CPT | Mod: CPTII,S$GLB,, | Performed by: STUDENT IN AN ORGANIZED HEALTH CARE EDUCATION/TRAINING PROGRAM

## 2024-02-28 PROCEDURE — 1126F AMNT PAIN NOTED NONE PRSNT: CPT | Mod: CPTII,S$GLB,, | Performed by: STUDENT IN AN ORGANIZED HEALTH CARE EDUCATION/TRAINING PROGRAM

## 2024-02-28 PROCEDURE — 99999 PR PBB SHADOW E&M-EST. PATIENT-LVL V: CPT | Mod: PBBFAC,,, | Performed by: STUDENT IN AN ORGANIZED HEALTH CARE EDUCATION/TRAINING PROGRAM

## 2024-02-28 PROCEDURE — 1159F MED LIST DOCD IN RCRD: CPT | Mod: CPTII,S$GLB,, | Performed by: STUDENT IN AN ORGANIZED HEALTH CARE EDUCATION/TRAINING PROGRAM

## 2024-02-28 PROCEDURE — 1101F PT FALLS ASSESS-DOCD LE1/YR: CPT | Mod: CPTII,S$GLB,, | Performed by: STUDENT IN AN ORGANIZED HEALTH CARE EDUCATION/TRAINING PROGRAM

## 2024-02-28 NOTE — PROGRESS NOTES
Section of Cardiology                  Cardiac Clinic Note    Chief Complaint/Reason for consultation: shortness of breath       HPI:   Casie Gaines is a 68 y.o. female with h/o ovarian cancer s/p radiation/chemo/surgery, hypertension, obesity who was referred to cardiology clinic by Dr. Pugh for evaluation.          2/28/24  Has been having Sob since 9/23-10/23  Had chemo/radiation/surgery for ovarian cancer x 2   Has been retaining fluid, LE swelling  Had to go to the ED, received IV lasix  Was just started on lasix at home- thinks SOB has gotten worse    Wearing compression stockings  Watching salt intake, but eats shellfish and fried fish, drinking lost sodas  November, had lower extremity left leg venous ultrasound with no DVT  Echo 2/24 EF 55-60%, normal diastolic function, concentric hypertrophy, mild MR    Stopped chemo 2 weeks ago     Stopped smoking 2019    Family history:     EKG  2/24/24 NSR, no acute ST - T wave changes    ECHO  Results for orders placed during the hospital encounter of 02/26/24    Echo    Interpretation Summary    Left Ventricle: The left ventricle is normal in size. Mildly increased ventricular mass. Mildly increased wall thickness. There is concentric hypertrophy. There is normal systolic function with a visually estimated ejection fraction of 55 - 60%. There is normal diastolic function.    Right Ventricle: Normal right ventricular cavity size. Wall thickness is normal. Right ventricle wall motion  is normal. Systolic function is normal.    Pulmonic Valve: There is mild regurgitation.    IVC/SVC: Normal venous pressure at 3 mmHg.       STRESS TEST No results found for this or any previous visit.       LHC No results found for this or any previous visit.            ROS: All 10 systems reviewed. Please refer to the HPI for pertinent positives. All other systems negative.     Past Medical History  Past Medical History:   Diagnosis Date    Anemia     Anxiety      Arthritis     knees    Cancer     ovarian    CHF (congestive heart failure)     Hx antineoplastic chemotherapy     last 2019    Hyperlipemia     Hypertension     Neck pain     Ovarian cancer 2019    CHEMO    Peritoneal carcinomatosis 2019       Surgical History  Past Surgical History:   Procedure Laterality Date    breast reduction  10/02/2018    CATARACT EXTRACTION Bilateral     2023     SECTION      X 1    COLONOSCOPY N/A 2021    Procedure: COLONOSCOPY;  Surgeon: aPulette Rojas MD;  Location: [facility] ENDO;  Service: Gastroenterology;  Laterality: N/A;    CYSTOSCOPY W/ URETERAL STENT PLACEMENT Right 2019    Procedure: CYSTOSCOPY, WITH URETERAL STENT INSERTION;  Surgeon: Timmy Santiago IV, MD;  Location: [facility] OR;  Service: Urology;  Laterality: Right;    CYSTOSCOPY W/ URETERAL STENT REMOVAL  10/04/2019    DILATION AND CURETTAGE OF UTERUS      HYSTERECTOMY      RALH for fibroids (still has ovaries)    INSERTION OF VENOUS ACCESS PORT Left 2019    Procedure: INSERTION, VENOUS ACCESS PORT;  Surgeon: Ulisses Monzon MD;  Location: [facility] OR;  Service: General;  Laterality: Left;  Left internal jugular     LYSIS OF ADHESIONS OF URETER N/A 08/15/2019    Procedure: URETEROLYSIS;  Surgeon: Ismael Juarez MD;  Location: [facility] OR;  Service: OB/GYN;  Laterality: N/A;    OMENTECTOMY N/A 08/15/2019    Procedure: OMENTECTOMY;  Surgeon: Ismael Juarez MD;  Location: [facility] OR;  Service: OB/GYN;  Laterality: N/A;    RETROGRADE PYELOGRAPHY Right 2019    Procedure: PYELOGRAM, RETROGRADE;  Surgeon: Timmy Santiago IV, MD;  Location: [facility];  Service: Urology;  Laterality: Right;    ROBOT-ASSISTED LAPAROSCOPIC SALPINGO-OOPHORECTOMY USING DA TIA XI Bilateral 08/15/2019    Procedure: XI ROBOTIC SALPINGO-OOPHORECTOMY;  Surgeon: Ismael Juarez MD;  Location: [facility];  Service: OB/GYN;  Laterality: Bilateral;    TOTAL REDUCTION MAMMOPLASTY  2018    TUBAL LIGATION            Allergies:    Review of patient's allergies indicates:  No Known Allergies    Social History:  Social History     Socioeconomic History    Marital status:    Tobacco Use    Smoking status: Former     Current packs/day: 0.00     Average packs/day: 1 pack/day for 25.0 years (25.0 ttl pk-yrs)     Types: Cigarettes     Start date: 10/1/1993     Quit date: 10/1/2018     Years since quittin.4    Smokeless tobacco: Never    Tobacco comments:     States started quit 2 months ago after 30 years   Substance and Sexual Activity    Alcohol use: Yes     Comment: occasionally  No alcohol 72h prior to sx    Drug use: No    Sexual activity: Not Currently     Partners: Male     Comment: hyst; mut monog   Social History Narrative    Live w/ spouse and 2 dogs      Social Determinants of Health     Financial Resource Strain: Low Risk  (2023)    Overall Financial Resource Strain (CARDIA)     Difficulty of Paying Living Expenses: Not hard at all   Food Insecurity: No Food Insecurity (2023)    Hunger Vital Sign     Worried About Running Out of Food in the Last Year: Never true     Ran Out of Food in the Last Year: Never true   Transportation Needs: No Transportation Needs (2023)    PRAPARE - Transportation     Lack of Transportation (Medical): No     Lack of Transportation (Non-Medical): No   Physical Activity: Unknown (2023)    Exercise Vital Sign     Days of Exercise per Week: 0 days   Stress: No Stress Concern Present (2023)    [place] [employer] - Occupational Stress Questionnaire     Feeling of Stress : Not at all   Social Connections: Unknown (2023)    Social Connection and Isolation Panel [NHANES]     Frequency of Communication with Friends and Family: More than three times a week     Frequency of Social Gatherings with Friends and Family: More than three times a week     Active Member of Clubs or Organizations: Yes     Attends Club or Organization Meetings: More than 4 times  per year     Marital Status:    Housing Stability: Unknown (11/30/2023)    Housing Stability Vital Sign     Unable to Pay for Housing in the Last Year: No     Unstable Housing in the Last Year: No       Family History:  family history includes Anesthesia problems in an other family member; Breast cancer in her maternal aunt and paternal aunt; Colon cancer in her brother; Glaucoma in her mother; No Known Problems in her father; Ovarian cancer in her paternal aunt.    Home Medications:  Current Outpatient Medications on File Prior to Visit   Medication Sig Dispense Refill    ALPRAZolam (XANAX) 0.25 MG tablet Take 1 tablet (0.25 mg total) by mouth 3 (three) times daily as needed for Anxiety. 60 tablet 2    amLODIPine (NORVASC) 5 MG tablet Take 2 tablets (10 mg total) by mouth once daily. 180 tablet 3    atenoloL (TENORMIN) 25 MG tablet Take 1 tablet (25 mg total) by mouth once daily. 90 tablet 2    benzonatate (TESSALON) 200 MG capsule Take 1 capsule (200 mg total) by mouth 3 (three) times daily as needed for Cough. 25 capsule 0    biotin 1 mg tablet Take 1,000 mcg by mouth once daily.       dexAMETHasone (DECADRON) 4 MG Tab Take 2 tablets (8 mg total) by mouth once daily. Take as directed on days 2, 3 and 4 of your chemotherapy cycle. 6 tablet 11    diclofenac sodium (VOLTAREN) 1 % Gel Apply once a day to back as needed 100 g 1    fluticasone propionate (FLONASE) 50 mcg/actuation nasal spray 1 spray (50 mcg total) by Each Nostril route every 12 (twelve) hours as needed for Rhinitis. 16 g 0    furosemide (LASIX) 40 MG tablet Take 1 tablet (40 mg total) by mouth once daily. 30 tablet 11    ginkgo biloba 40 mg Tab Take 40 mg by mouth.      magnesium oxide (MAG-OX) 400 mg (241.3 mg magnesium) tablet Take 1 tablet (400 mg total) by mouth once daily. 1 tablet 0    montelukast (SINGULAIR) 10 mg tablet Take 1 tablet (10 mg total) by mouth every evening. 30 tablet 1    multivitamin with minerals tablet Take 1 tablet by  "mouth once daily.       OLANZapine (ZYPREXA) 5 MG tablet Take 1 tablet (5 mg total) by mouth every evening on days 1 through 4 of your chemotherapy regimen. 4 tablet 11    olmesartan-hydrochlorothiazide (BENICAR HCT) 40-25 mg per tablet Take 1 tablet by mouth once daily. 90 tablet 1    ondansetron (ZOFRAN-ODT) 8 MG TbDL Dissolve 1 tablet (8 mg total) under the tongue every 6 (six) hours as needed. 30 tablet 11    pantoprazole (PROTONIX) 40 MG tablet Take 1 tablet (40 mg total) by mouth once daily. 90 tablet 1    potassium chloride SA (K-DUR,KLOR-CON) 20 MEQ tablet Take 1 tablet (20 mEq total) by mouth 2 (two) times daily. 2 tablet 0    prochlorperazine (COMPAZINE) 5 MG tablet Take 2 tablets (10 mg total) by mouth every 6 (six) hours as needed (nausea or vomiting). 30 tablet 11    rosuvastatin (CRESTOR) 10 MG tablet Take 1 tablet (10 mg total) by mouth once daily. 90 tablet 1    sertraline (ZOLOFT) 25 MG tablet Take 1 tablet (25 mg total) by mouth once daily. 90 tablet 0    zinc gluconate 50 mg tablet Take 50 mg by mouth once daily.      albuterol 90 mcg/actuation inhaler Inhale 2 puffs into the lungs every 4 (four) hours as needed for Wheezing. Rescue 18 g 0    azelastine (ASTELIN) 137 mcg (0.1 %) nasal spray 1 spray (137 mcg total) by Nasal route 2 (two) times daily. 30 mL 0    cetirizine (ZYRTEC) 10 MG tablet Take 1 tablet (10 mg total) by mouth once daily. 30 tablet 5    EPINEPHrine (EPIPEN) 0.3 mg/0.3 mL AtIn Inject 0.3 mLs (0.3 mg total) into the muscle once. for 1 dose 2 each 0    gabapentin (NEURONTIN) 100 MG capsule Take 1 capsule (100 mg total) by mouth 3 (three) times daily. 90 capsule 11     No current facility-administered medications on file prior to visit.       Physical exam:  /62 (BP Location: Left arm, Patient Position: Sitting, BP Method: Large (Manual))   Pulse 85   Ht 5' 7" (1.702 m)   Wt 118.3 kg (260 lb 12.9 oz)   SpO2 98%   BMI 40.85 kg/m²         General: Pt is a 68 y.o. year old " female who is AAOx3, in NAD, is pleasant, well nourished, looks stated age  HEENT: PERRL, EOMI, Oral mucosa pink & moist  CVS  No abnormal cardiac pulsations noted on inspection. JVP not raised. The apical impulse is normal on palpation, and is located in the left 5th intercostal space in the mid - clavicular line. No palpable thrills or abnormal pulsations noted. RR, S1 - S2 heard, no murmurs, rubs or gallops appreciated.   PUL : CTA B/L. No wheezes/crackles heard   ABD : BS +, soft. No tenderness elicited   LE :  Trace edema bilateral, left leg bigger than right Distal Pulses palpable B/L         LABS:    Chemistry:   Lab Results   Component Value Date     02/26/2024    K 3.3 (L) 02/26/2024     02/26/2024    CO2 30 (H) 02/26/2024    BUN 15 02/26/2024    CREATININE 0.8 02/26/2024    CALCIUM 9.2 02/26/2024     Cardiac Markers:   Lab Results   Component Value Date    TROPONINI <0.006 02/24/2024     Cardiac Markers (Last 3):   Lab Results   Component Value Date    TROPONINI <0.006 02/24/2024    TROPONINI <0.006 11/10/2023    TROPONINI <0.006 01/25/2019     CBC:   Lab Results   Component Value Date    WBC 5.50 02/24/2024    HGB 9.8 (L) 02/24/2024    HCT 30.6 (L) 02/24/2024    MCV 91 02/24/2024     02/24/2024     Lipids:   Lab Results   Component Value Date    CHOL 163 01/06/2022    TRIG 71 01/06/2022    HDL 51 01/06/2022     Coagulation:   Lab Results   Component Value Date    INR 1.0 01/28/2019    APTT 27.6 01/28/2019           Assessment        1. SOB (shortness of breath)    2. Localized swelling of left lower leg    3. Congestive heart failure, unspecified HF chronicity, unspecified heart failure type    4. DDD (degenerative disc disease), cervical    5. Primary hypertension    6. Mixed hyperlipidemia    7. Abnormal ECG    8. Ovarian cancer, bilateral         Plan:    Lower extremity swelling  BNP down to 14   Has been taking Lasix, low potassium (not taking potassium supplements regularly  because it makes her urinate)  Hold Lasix for now as shortness of breath could be due to possible dehydration  Obtain D-dimer given history of cancer   Echo 2/24 EF 55-60%, normal diastolic function, concentric hypertrophy, mild MR  Significantly needs to decrease salt intake    DJD   Stable   Continue p.r.n. pain medications     HLD  LDL 98 as of 1/22  Continue Crestor  Recheck next visit    Ovarian cancer  S/p radiation/chemo/surgery    Hypertension  Stable   Continue amlodipine, olmesartan/HCTZ, atenolol    Obesity, Body mass index is 40.85 kg/m².   Low salt, low fat diet  Exercise as tolerated, at least 30 min daily     This note was prepared using voice recognition system and is likely to have sound alike errors that may have been overlooked even after proofreading.     I have reviewed all pertinent chart information.  Plans and recommendations have been formulated under my direct supervision. All questions answered and patient voiced understanding.   If symptoms persist go to the ED.    RTC in 2 m        India Ferrara MD  Cardiology

## 2024-02-28 NOTE — PROGRESS NOTES
"Subjective:      Patient ID: Casie Gaines is a 68 y.o. female.    Vitals:  height is 5' 7" (1.702 m) and weight is 117 kg (257 lb 15 oz). Her oral temperature is 97.9 °F (36.6 °C). Her blood pressure is 113/65 and her pulse is 89. Her respiration is 18 and oxygen saturation is 97%.     Chief Complaint: Cough    Patient is a 68-year-old female who is currently undergoing treatment for cancer who presents for evaluation of a slight cough.  Onset yesterday.  States that her grandson has similar symptoms and there was concern for possible COVID exposure through his school.  Denies congestion, dyspnea, wheezing, nausea, vomiting, diarrhea.  No recent fever noted.  No recent travel reported.  No over-the-counter medication used for symptoms.  No other concerns voiced.    Cough  This is a new problem. The current episode started yesterday. The problem has been unchanged. Pertinent negatives include no chills, ear pain, fever, rash, sore throat, shortness of breath or wheezing. She has tried nothing for the symptoms. The treatment provided no relief.       Constitution: Negative for chills and fever.   HENT:  Negative for ear pain, congestion and sore throat.    Respiratory:  Positive for cough. Negative for sputum production, shortness of breath, stridor and wheezing.    Gastrointestinal:  Negative for nausea, vomiting and diarrhea.   Skin:  Negative for rash.   Allergic/Immunologic: Positive for immunocompromised state.      Objective:     Physical Exam   Constitutional: She is oriented to person, place, and time. She appears well-developed. She is cooperative.  Non-toxic appearance. She does not appear ill. No distress.   HENT:   Head: Normocephalic and atraumatic.   Ears:   Right Ear: Hearing and external ear normal.   Left Ear: Hearing and external ear normal.   Nose: Nose normal. No mucosal edema, rhinorrhea or nasal deformity. No epistaxis. Right sinus exhibits no maxillary sinus tenderness and no frontal " sinus tenderness. Left sinus exhibits no maxillary sinus tenderness and no frontal sinus tenderness.   Mouth/Throat: Uvula is midline, oropharynx is clear and moist and mucous membranes are normal. No trismus in the jaw. Normal dentition. No uvula swelling. No oropharyngeal exudate, posterior oropharyngeal edema or posterior oropharyngeal erythema.   Eyes: Conjunctivae and lids are normal. No scleral icterus.   Neck: Trachea normal and phonation normal. Neck supple. No edema present. No erythema present. No neck rigidity present.   Cardiovascular: Normal rate, regular rhythm, normal heart sounds and normal pulses.   Pulmonary/Chest: Effort normal and breath sounds normal. No respiratory distress. She has no decreased breath sounds. She has no wheezes. She has no rhonchi. She has no rales.   Abdominal: Normal appearance.   Musculoskeletal: Normal range of motion.         General: No deformity. Normal range of motion.   Neurological: She is alert and oriented to person, place, and time. She exhibits normal muscle tone. Coordination normal.   Skin: Skin is warm, dry, intact, not diaphoretic and not pale.   Psychiatric: Her speech is normal and behavior is normal. Judgment and thought content normal.   Nursing note and vitals reviewed.      Assessment:     1. Cough, unspecified type        Plan:       Cough, unspecified type  -     SARS Coronavirus 2 Antigen, POCT Manual Read    Other orders  -     benzonatate (TESSALON) 200 MG capsule; Take 1 capsule (200 mg total) by mouth 3 (three) times daily as needed for Cough.  Dispense: 25 capsule; Refill: 0          Medical Decision Making:   Initial Assessment:   Nontoxic appearing 69 yo female c/o cough.  After complete evaluation, including thorough history and physical exam, the patient's symptoms are most likely due to viral upper respiratory infection. There are no concerning features on physical exam to suggest bacterial otitis media/externa, sinusitis, strep  pharyngitis, or peritonsillar abscess. Vital signs do not suggest sepsis. Lung sounds are clear and not consistent with pneumonia. There is no neck pain or limited ROM to suggest retropharyngeal abscess or meningitis. In clinic testing for covid is negative.The patient will be treated with supportive care. Will provide RX for tessalon upon D/C. Follow up instructions and ED precautions provided.     Clinical Tests:   Lab Tests: Reviewed       <> Summary of Lab: Covid negative  Patient was currently undergoing treatment for cancer in his immunocompromised.  Strong consideration given to concerning causes for infection as a result.       Results for orders placed or performed in visit on 02/27/24   SARS Coronavirus 2 Antigen, POCT Manual Read   Result Value Ref Range    SARS Coronavirus 2 Antigen Negative Negative     Acceptable Yes      *Note: Due to a large number of results and/or encounters for the requested time period, some results have not been displayed. A complete set of results can be found in Results Review.     Patient Instructions   COUGH  Rest and fluids are important    You can use honey with bruce to soothe your throat    Take prescription cough meds (pills) as prescribed.  Do not take both the prescribed cough pills and syrup at the same time.     Tylenol or ibuprofen can also be used as directed for pain unless you have an allergy to them or medical condition such as stomach ulcers, kidney or liver disease or blood thinners etc for which you should not be taking these type of medications.     Please follow up with your primary care doctor or specialist in the next 48-72hrs as needed and if no improvement    If your condition worsens or fails to improve we recommend that you receive another evaluation at the ER immediately or contact your PCP to discuss your concerns or return here. You must understand that you've received an urgent care treatment only and that you may be released  before all your medical problems are known or treated. You the patient will arrange for follouwp care as instructed. .

## 2024-02-28 NOTE — PATIENT INSTRUCTIONS
COUGH  Rest and fluids are important    You can use honey with bruce to soothe your throat    Take prescription cough meds (pills) as prescribed.  Do not take both the prescribed cough pills and syrup at the same time.     Tylenol or ibuprofen can also be used as directed for pain unless you have an allergy to them or medical condition such as stomach ulcers, kidney or liver disease or blood thinners etc for which you should not be taking these type of medications.     Please follow up with your primary care doctor or specialist in the next 48-72hrs as needed and if no improvement    If your condition worsens or fails to improve we recommend that you receive another evaluation at the ER immediately or contact your PCP to discuss your concerns or return here. You must understand that you've received an urgent care treatment only and that you may be released before all your medical problems are known or treated. You the patient will arrange for follouwp care as instructed. .

## 2024-02-29 ENCOUNTER — LAB VISIT (OUTPATIENT)
Dept: LAB | Facility: HOSPITAL | Age: 69
End: 2024-02-29
Attending: STUDENT IN AN ORGANIZED HEALTH CARE EDUCATION/TRAINING PROGRAM
Payer: MEDICARE

## 2024-02-29 DIAGNOSIS — R06.02 SOB (SHORTNESS OF BREATH): ICD-10-CM

## 2024-02-29 LAB — D DIMER PPP IA.FEU-MCNC: 1.47 MG/L FEU

## 2024-02-29 PROCEDURE — 85379 FIBRIN DEGRADATION QUANT: CPT | Performed by: STUDENT IN AN ORGANIZED HEALTH CARE EDUCATION/TRAINING PROGRAM

## 2024-02-29 PROCEDURE — 36415 COLL VENOUS BLD VENIPUNCTURE: CPT | Performed by: STUDENT IN AN ORGANIZED HEALTH CARE EDUCATION/TRAINING PROGRAM

## 2024-03-01 NOTE — PROGRESS NOTES
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  female with past medical history significant for hypertension, CHF here for follow up of ovarian cancer    Pt was diagnosed with peritoneal carcinomatosis and malignant right pleural effusion.  Final pathology consistent with ovarian primary with CA-125 of 2740.    PET scan demonstrates multiple omental and peritoneal avid masses consistent with peritoneal carcinomatosis, extensive retroperitoneal and mediastinal lymphadenopathy, masslike structure in the right iliac fossa likely representing ovarian mass.    Patient evaluated by GYN Oncology Dr. Juarez and MD Green with recommendations for carboplatin/Taxol/Avastin.      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  S/p 6 cycle avastin taxol carboplatin.  Has great MS after 6 cycles.   On 8/15/2019 underwent salpingoophorectomy. Pathology showed CR.   Started on maintenance avastin and later on noted proteinuria.      Interval History:     Patient was on surveillance and had repeat CT scan of chest abdomen pelvis done on 08/18/2023 showing evidence of recurrence and patient has been referred here for management with chemo.  Patient is complaining of new onset abdominal discomfort over the last 1 month.  Denies any associated nausea, vomiting, diarrhea, constipation.  She is also complaining of some discomfort in her right shoulder region and hiccups    Oncology History    Problem: Adult Inpatient Plan of Care  Goal: Plan of Care Review  Outcome: Ongoing, Progressing  Flowsheets (Taken 3/1/2024 9319)  Progress: improving  Plan of Care Reviewed With: patient  Outcome Evaluation: chest tube to waterseal. pt denies pain, son at bedside. no distress noted.   Goal Outcome Evaluation:  Plan of Care Reviewed With: patient        Progress: improving  Outcome Evaluation: chest tube to waterseal. pt denies pain, son at bedside. no distress noted.                                  Peritoneal carcinomatosis   1/26/2019 Initial Diagnosis    Peritoneal carcinomatosis     2/15/2019 - 7/8/2019 Chemotherapy    Treatment Summary   Plan Name: OP GYN PACLITAXEL CARBOPLATIN (AUC 6) Q3W  Treatment Goal: Palliative  Status: Inactive  Start Date: 2/28/2019  End Date: 6/18/2019  Provider: Will Trevizo Jr., MD  Chemotherapy: bevacizumab (AVASTIN) 15 mg/kg = 1,600 mg in sodium chloride 0.9% 100 mL chemo infusion, 15 mg/kg = 1,600 mg (100 % of original dose 15 mg/kg), Intravenous, Clinic/HOD 1 time, 6 of 6 cycles  Dose modification: 15 mg/kg (original dose 15 mg/kg, Cycle 1)  Administration: 1,600 mg (2/28/2019), 1,600 mg (3/21/2019), 1,600 mg (4/11/2019), 1,600 mg (5/7/2019), 1,600 mg (5/28/2019), 1,510 mg (6/18/2019)  CARBOplatin (PARAPLATIN) 870 mg in sodium chloride 0.9% 337 mL chemo infusion, 870 mg (100 % of original dose 868.8 mg), Intravenous, Clinic/HOD 1 time, 6 of 6 cycles  Dose modification:   (original dose 868.8 mg, Cycle 1)  Administration: 870 mg (2/28/2019), 870 mg (3/21/2019), 900 mg (4/11/2019), 870 mg (5/7/2019), 660 mg (5/28/2019), 690 mg (6/18/2019)  PACLitaxel (TAXOL) 175 mg/m2 = 396 mg in sodium chloride 0.9% 566 mL chemo infusion, 175 mg/m2 = 396 mg, Intravenous, Clinic/HOD 1 time, 6 of 6 cycles  Dose modification: 140 mg/m2 (80 % of original dose 175 mg/m2, Cycle 5)  Administration: 396 mg (2/28/2019), 396 mg (3/21/2019), 396 mg (4/11/2019), 396 mg (5/7/2019), 318 mg (5/28/2019), 306 mg (6/18/2019)     10/9/2019 - 9/24/2020 Chemotherapy    Treatment Summary   Plan Name: OP BEVACIZUMAB Q3W   Treatment Goal: Maintenance  Status: Inactive  Start Date: 10/9/2019  End Date: 9/24/2020  Provider: Oscar Mckeon MD  Chemotherapy: dexAMETHasone tablet 8 mg, 8 mg (100 % of original dose 8 mg), Oral, Clinic/HOD 1 time, 2 of 8 cycles  Dose modification: 8 mg (original dose 8 mg, Cycle 8), 8 mg (original dose 8 mg, Cycle 12)  Administration: 8 mg (7/22/2020), 8 mg (8/13/2020)  bevacizumab  (AVASTIN) 15 mg/kg = 1,615 mg in sodium chloride 0.9% 100 mL chemo infusion, 15 mg/kg = 1,615 mg, Intravenous, Clinic/HOD 1 time, 11 of 17 cycles  Administration: 1,615 mg (10/9/2019), 1,615 mg (10/29/2019), 1,615 mg (12/10/2019), 1,615 mg (1/21/2020), 1,600 mg (4/2/2020), 1,615 mg (4/23/2020), 1,600 mg (7/1/2020), 1,600 mg (7/22/2020), 1,600 mg (8/13/2020), 1,795 mg (9/3/2020), 1,795 mg (9/24/2020)     9/20/2023 -  Chemotherapy    Treatment Summary   Plan Name: OP GYN PACLITAXEL CARBOPLATIN (AUC 6) Q3W  Treatment Goal: Control  Status: Active  Start Date: 9/20/2023 (Planned)  End Date: 8/21/2024 (Planned)  Provider: Ismael Juarez MD  Chemotherapy: CARBOplatin (PARAPLATIN) in sodium chloride 0.9% 250 mL chemo infusion, , Intravenous, Clinic/HOD 1 time, 0 of 17 cycles  PACLitaxeL (TAXOL) 175 mg/m2 in sodium chloride 0.9% chemo infusion, 175 mg/m2, Intravenous, Clinic/HOD 1 time, 0 of 17 cycles     Malignant neoplasm of right ovary (Resolved)   2/15/2019 Initial Diagnosis    Malignant neoplasm of right ovary     2/15/2019 - 7/8/2019 Chemotherapy    Treatment Summary   Plan Name: OP GYN PACLITAXEL CARBOPLATIN (AUC 6) Q3W  Treatment Goal: Palliative  Status: Inactive  Start Date: 2/28/2019  End Date: 6/18/2019  Provider: Will Trevizo Jr., MD  Chemotherapy: bevacizumab (AVASTIN) 15 mg/kg = 1,600 mg in sodium chloride 0.9% 100 mL chemo infusion, 15 mg/kg = 1,600 mg (100 % of original dose 15 mg/kg), Intravenous, Clinic/HOD 1 time, 6 of 6 cycles  Dose modification: 15 mg/kg (original dose 15 mg/kg, Cycle 1)  Administration: 1,600 mg (2/28/2019), 1,600 mg (3/21/2019), 1,600 mg (4/11/2019), 1,600 mg (5/7/2019), 1,600 mg (5/28/2019), 1,510 mg (6/18/2019)  CARBOplatin (PARAPLATIN) 870 mg in sodium chloride 0.9% 337 mL chemo infusion, 870 mg (100 % of original dose 868.8 mg), Intravenous, Clinic/HOD 1 time, 6 of 6 cycles  Dose modification:   (original dose 868.8 mg, Cycle 1)  Administration: 870 mg (2/28/2019), 870 mg  (3/21/2019), 900 mg (4/11/2019), 870 mg (5/7/2019), 660 mg (5/28/2019), 690 mg (6/18/2019)  PACLitaxel (TAXOL) 175 mg/m2 = 396 mg in sodium chloride 0.9% 566 mL chemo infusion, 175 mg/m2 = 396 mg, Intravenous, Clinic/HOD 1 time, 6 of 6 cycles  Dose modification: 140 mg/m2 (80 % of original dose 175 mg/m2, Cycle 5)  Administration: 396 mg (2/28/2019), 396 mg (3/21/2019), 396 mg (4/11/2019), 396 mg (5/7/2019), 318 mg (5/28/2019), 306 mg (6/18/2019)     10/9/2019 - 9/24/2020 Chemotherapy    Treatment Summary   Plan Name: OP BEVACIZUMAB Q3W   Treatment Goal: Maintenance  Status: Inactive  Start Date: 10/9/2019  End Date: 9/24/2020  Provider: Oscar Mckeon MD  Chemotherapy: dexAMETHasone tablet 8 mg, 8 mg (100 % of original dose 8 mg), Oral, Clinic/HOD 1 time, 2 of 8 cycles  Dose modification: 8 mg (original dose 8 mg, Cycle 8), 8 mg (original dose 8 mg, Cycle 12)  Administration: 8 mg (7/22/2020), 8 mg (8/13/2020)  bevacizumab (AVASTIN) 15 mg/kg = 1,615 mg in sodium chloride 0.9% 100 mL chemo infusion, 15 mg/kg = 1,615 mg, Intravenous, Clinic/HOD 1 time, 11 of 17 cycles  Administration: 1,615 mg (10/9/2019), 1,615 mg (10/29/2019), 1,615 mg (12/10/2019), 1,615 mg (1/21/2020), 1,600 mg (4/2/2020), 1,615 mg (4/23/2020), 1,600 mg (7/1/2020), 1,600 mg (7/22/2020), 1,600 mg (8/13/2020), 1,795 mg (9/3/2020), 1,795 mg (9/24/2020)     Malignant neoplasm of both ovaries (Resolved)   2/18/2019 Initial Diagnosis    Ovarian cancer     10/9/2019 - 9/24/2020 Chemotherapy    Treatment Summary   Plan Name: OP BEVACIZUMAB Q3W   Treatment Goal: Maintenance  Status: Inactive  Start Date: 10/9/2019  End Date: 9/24/2020  Provider: Oscar Mckeon MD  Chemotherapy: dexAMETHasone tablet 8 mg, 8 mg (100 % of original dose 8 mg), Oral, Clinic/HOD 1 time, 2 of 8 cycles  Dose modification: 8 mg (original dose 8 mg, Cycle 8), 8 mg (original dose 8 mg, Cycle 12)  Administration: 8 mg (7/22/2020), 8 mg (8/13/2020)  bevacizumab (AVASTIN) 15 mg/kg =  1,615 mg in sodium chloride 0.9% 100 mL chemo infusion, 15 mg/kg = 1,615 mg, Intravenous, Clinic/HOD 1 time, 11 of 17 cycles  Administration: 1,615 mg (10/9/2019), 1,615 mg (10/29/2019), 1,615 mg (12/10/2019), 1,615 mg (1/21/2020), 1,600 mg (4/2/2020), 1,615 mg (4/23/2020), 1,600 mg (7/1/2020), 1,600 mg (7/22/2020), 1,600 mg (8/13/2020), 1,795 mg (9/3/2020), 1,795 mg (9/24/2020)     Ovarian cancer, bilateral   8/15/2019 Initial Diagnosis    Ovarian cancer, bilateral     10/9/2019 - 9/24/2020 Chemotherapy    Treatment Summary   Plan Name: OP BEVACIZUMAB Q3W   Treatment Goal: Maintenance  Status: Inactive  Start Date: 10/9/2019  End Date: 9/24/2020  Provider: Oscar Mckeon MD  Chemotherapy: dexAMETHasone tablet 8 mg, 8 mg (100 % of original dose 8 mg), Oral, Clinic/HOD 1 time, 2 of 8 cycles  Dose modification: 8 mg (original dose 8 mg, Cycle 8), 8 mg (original dose 8 mg, Cycle 12)  Administration: 8 mg (7/22/2020), 8 mg (8/13/2020)  bevacizumab (AVASTIN) 15 mg/kg = 1,615 mg in sodium chloride 0.9% 100 mL chemo infusion, 15 mg/kg = 1,615 mg, Intravenous, Clinic/HOD 1 time, 11 of 17 cycles  Administration: 1,615 mg (10/9/2019), 1,615 mg (10/29/2019), 1,615 mg (12/10/2019), 1,615 mg (1/21/2020), 1,600 mg (4/2/2020), 1,615 mg (4/23/2020), 1,600 mg (7/1/2020), 1,600 mg (7/22/2020), 1,600 mg (8/13/2020), 1,795 mg (9/3/2020), 1,795 mg (9/24/2020)     9/20/2023 -  Chemotherapy    Treatment Summary   Plan Name: OP GYN PACLITAXEL CARBOPLATIN (AUC 6) Q3W  Treatment Goal: Control  Status: Active  Start Date: 9/20/2023 (Planned)  End Date: 8/21/2024 (Planned)  Provider: Ismael Juarez MD  Chemotherapy: CARBOplatin (PARAPLATIN) in sodium chloride 0.9% 250 mL chemo infusion, , Intravenous, Clinic/HOD 1 time, 0 of 17 cycles  PACLitaxeL (TAXOL) 175 mg/m2 in sodium chloride 0.9% chemo infusion, 175 mg/m2, Intravenous, Clinic/HOD 1 time, 0 of 17 cycles         Past Medical History:   Diagnosis Date    Anemia     Anxiety     Arthritis      knees    Cancer     ovarian    CHF (congestive heart failure)     Hx antineoplastic chemotherapy     last 2019    Hyperlipemia     Hypertension     Neck pain     Ovarian cancer 2019    CHEMO    Peritoneal carcinomatosis 2019      Past Surgical History:   Procedure Laterality Date    breast reduction  10/02/2018    CATARACT EXTRACTION Bilateral     2023     SECTION      X 1    COLONOSCOPY N/A 2021    Procedure: COLONOSCOPY;  Surgeon: Paulette Rojas MD;  Location: [facility] ENDO;  Service: Gastroenterology;  Laterality: N/A;    CYSTOSCOPY W/ URETERAL STENT PLACEMENT Right 2019    Procedure: CYSTOSCOPY, WITH URETERAL STENT INSERTION;  Surgeon: Timmy Santiago IV, MD;  Location: [facility] OR;  Service: Urology;  Laterality: Right;    CYSTOSCOPY W/ URETERAL STENT REMOVAL  10/04/2019    DILATION AND CURETTAGE OF UTERUS      HYSTERECTOMY      RALH for fibroids (still has ovaries)    INSERTION OF VENOUS ACCESS PORT Left 2019    Procedure: INSERTION, VENOUS ACCESS PORT;  Surgeon: Ulisses Monzon MD;  Location: [facility] OR;  Service: General;  Laterality: Left;  Left internal jugular     LYSIS OF ADHESIONS OF URETER N/A 08/15/2019    Procedure: URETEROLYSIS;  Surgeon: Ismael Juarez MD;  Location: [facility] OR;  Service: OB/GYN;  Laterality: N/A;    OMENTECTOMY N/A 08/15/2019    Procedure: OMENTECTOMY;  Surgeon: Ismael Juarez MD;  Location: [facility] OR;  Service: OB/GYN;  Laterality: N/A;    RETROGRADE PYELOGRAPHY Right 2019    Procedure: PYELOGRAM, RETROGRADE;  Surgeon: Timmy Santiago IV, MD;  Location: [facility] OR;  Service: Urology;  Laterality: Right;    ROBOT-ASSISTED LAPAROSCOPIC SALPINGO-OOPHORECTOMY USING DA TIA XI Bilateral 08/15/2019    Procedure: XI ROBOTIC SALPINGO-OOPHORECTOMY;  Surgeon: Ismael Juarez MD;  Location: [facility] OR;  Service: OB/GYN;  Laterality: Bilateral;    TOTAL REDUCTION MAMMOPLASTY  2018    TUBAL LIGATION       Social History     Socioeconomic History    Marital  status:    Tobacco Use    Smoking status: Former     Current packs/day: 0.00     Average packs/day: 1 pack/day for 25.0 years (25.0 ttl pk-yrs)     Types: Cigarettes     Start date: 10/1/1993     Quit date: 10/1/2018     Years since quittin.9    Smokeless tobacco: Never    Tobacco comments:     States started quit 2 months ago after 30 years   Substance and Sexual Activity    Alcohol use: Yes     Comment: occasionally  No alcohol 72h prior to sx    Drug use: No    Sexual activity: Not Currently     Partners: Male     Comment: hyst; mut monog   Social History Narrative    Live w/ spouse and 2 dogs      Social Determinants of Health     Stress: No Stress Concern Present (2019)    [employer] - Occupational Stress Questionnaire     Feeling of Stress : Only a little      Family History   Problem Relation Age of Onset    Glaucoma Mother     Colon cancer Brother     Breast cancer Maternal Aunt     Breast cancer Paternal Aunt     Ovarian cancer Paternal Aunt     Anesthesia problems Other     Thrombophilia Neg Hx           Review of patient's allergies indicates:  No Known Allergies     Review of Systems   Constitutional:  Positive for activity change and fatigue. Negative for chills and fever.   HENT: Negative.     Eyes: Negative.    Respiratory:  Negative for cough and shortness of breath.    Cardiovascular:  Negative for chest pain and leg swelling.   Gastrointestinal:  Positive for abdominal pain. Negative for constipation, diarrhea, nausea and vomiting.   Endocrine: Negative.    Genitourinary: Negative.    Musculoskeletal:  Negative for arthralgias and myalgias.   Integumentary:  Negative.   Allergic/Immunologic: Negative.    Neurological:  Negative for light-headedness, numbness and headaches.   Hematological: Negative.    Psychiatric/Behavioral: Negative.           Objective:        Vitals:    23 1127   BP: 119/69   Pulse: 77   Temp: 97.3 °F (36.3 °C)         Physical Exam  Constitutional:       General: She is not in acute distress.     Appearance: Normal appearance. She is not ill-appearing.   HENT:      Head: Normocephalic.      Mouth/Throat:      Mouth: Mucous membranes are moist.   Eyes:      Extraocular Movements: Extraocular movements intact.   Cardiovascular:      Rate and Rhythm: Normal rate and regular rhythm.   Pulmonary:      Effort: Pulmonary effort is normal. No respiratory distress.   Abdominal:      Tenderness: There is abdominal tenderness.   Musculoskeletal:         General: No swelling. Normal range of motion.   Skin:     General: Skin is warm.   Neurological:      Mental Status: She is alert and oriented to person, place, and time. Mental status is at baseline.   Psychiatric:         Mood and Affect: Mood normal.         Behavior: Behavior normal.         Thought Content: Thought content normal.         Judgment: Judgment normal.           LABS / IMAGING      - 08/21/2023 CT CAP: Marked interval disease progression with small right pleural effusion and severe peritoneal carcinomatosis. There is a large left lower quadrant parietal peritoneal implant measuring 3.4 x 1.9 cm. There are even nodular implants seen within the ventral abdominal wall small hernias. The left lower quadrant metastatic implant measures 4.0 x 5.6 x 4.7 cm (prior 1.3 x 1.0 x 1.2 cm). New left retroperitoneal nodule at the level of the aortic bifurcation measuring 1.7 x 1.5 cm.          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  Patient was then treated with Avastin maintenance  - 08/21/2023 CT CAP: Marked interval disease progression with severe peritoneal carcinomatosis as  detailed above.  - : 7 >> 21 >> 32 (7/2023)      Plan:     - Patient with a stage IV ovarian cancer here for follow-up after noted to have disease progression and now referred here for treatment.  Patient previously treated in 2019, thus will be treated like platinum sensitive disease with the same chemotherapy regimen.  Treatment plan has already been placed by gyn Oncology for carboplatin and paclitaxel.  Consented and will start with chemotherapy next week.  Labs requested for workup  - MD / LABS / TREATMENT VISIT - 1 WEEK         Med Onc Chart Routing      Follow up with physician 1 week.   Follow up with LUIS    Infusion scheduling note   Started with carboplatin and paclitaxel next week   Injection scheduling note    Labs CBC, CMP and magnesium   Scheduling:  Preferred lab:  Lab interval:  Labs before next visit   Imaging    Pharmacy appointment    Other referrals                    The patient was seen, interviewed and examined. Pertinent lab and radiology studies were reviewed. Pt instructed to call should develop concerning signs/symptoms or have further questions.       Portions of the record may have been created with voice recognition software. Occasional wrong-word or sound-a-like substitutions may have occurred due to the inherent limitations of voice recognition software. Read the chart carefully and recognize, using context, where substitutions have occurred.    Orlando Rangel MD  Hematology / Oncology

## 2024-03-04 ENCOUNTER — PATIENT MESSAGE (OUTPATIENT)
Dept: PULMONOLOGY | Facility: CLINIC | Age: 69
End: 2024-03-04
Payer: MEDICARE

## 2024-03-04 ENCOUNTER — HOSPITAL ENCOUNTER (EMERGENCY)
Facility: HOSPITAL | Age: 69
Discharge: HOME | End: 2024-03-04
Attending: EMERGENCY MEDICINE
Payer: MEDICARE

## 2024-03-04 ENCOUNTER — TELEPHONE (OUTPATIENT)
Dept: CARDIOLOGY | Facility: CLINIC | Age: 69
End: 2024-03-04
Payer: MEDICARE

## 2024-03-04 VITALS
HEART RATE: 67 BPM | TEMPERATURE: 98 F | WEIGHT: 254.75 LBS | SYSTOLIC BLOOD PRESSURE: 127 MMHG | BODY MASS INDEX: 39.98 KG/M2 | OXYGEN SATURATION: 96 % | RESPIRATION RATE: 15 BRPM | DIASTOLIC BLOOD PRESSURE: 64 MMHG | HEIGHT: 67 IN

## 2024-03-04 DIAGNOSIS — R06.02 SHORTNESS OF BREATH: Primary | ICD-10-CM

## 2024-03-04 DIAGNOSIS — Z85.43 HISTORY OF OVARIAN CANCER: ICD-10-CM

## 2024-03-04 LAB
OHS QRS DURATION: 80 MS
OHS QTC CALCULATION: 438 MS

## 2024-03-04 PROCEDURE — 93005 ELECTROCARDIOGRAM TRACING: CPT

## 2024-03-04 PROCEDURE — 99285 EMERGENCY DEPT VISIT HI MDM: CPT | Mod: 25

## 2024-03-04 PROCEDURE — 93010 ELECTROCARDIOGRAM REPORT: CPT | Mod: ,,, | Performed by: INTERNAL MEDICINE

## 2024-03-04 PROCEDURE — 25500020 PHARM REV CODE 255: Performed by: EMERGENCY MEDICINE

## 2024-03-04 RX ADMIN — IOHEXOL 100 ML: 350 INJECTION, SOLUTION INTRAVENOUS at 12:03

## 2024-03-04 NOTE — ED PROVIDER NOTES
SCRIBE #1 NOTE: I, Gemini Bean, am scribing for, and in the presence of, Chrissie Allen MD. I have scribed the entire note.       History     Chief Complaint   Patient presents with    Abnormal Labs     Pt states her PCP referred her here due to abnormal D Dimer results, possible blood clots.      Review of patient's allergies indicates:  No Known Allergies      History of Present Illness     HPI    3/4/2024, 3:12 PM  History obtained from the patient                  History of Present Illness: Casie Gaines is a 68 y.o. female patient with a PMHx of HTN, CHF, HLD, anemia, and ovarian cancer who presents to the Emergency Department for evaluation for her elevated D-dimer result on  lab work. Pt reports having shortness of breath for weeks. Symptoms are constant and moderate in severity. No mitigating or exacerbating factors reported. No associated sxs reported. Patient denies any fever, nausea, vomiting, headache, and all other sxs at this time. No Prior Tx reported. No further complaints or concerns at this time.     Arrival mode: Personal vehicle   PCP: Bell, Primary Doctor        Past Medical History:  Past Medical History:   Diagnosis Date    Anemia     Anxiety     Arthritis     knees    Cancer     ovarian    CHF (congestive heart failure)     Hx antineoplastic chemotherapy     last 2019    Hyperlipemia     Hypertension     Neck pain     Ovarian cancer 2019    CHEMO    Peritoneal carcinomatosis 2019       Past Surgical History:  Past Surgical History:   Procedure Laterality Date    breast reduction  10/02/2018    CATARACT EXTRACTION Bilateral     2023     SECTION      X 1    COLONOSCOPY N/A 2021    Procedure: COLONOSCOPY;  Surgeon: Paulette Rojas MD;  Location: [facility];  Service: Gastroenterology;  Laterality: N/A;    CYSTOSCOPY W/ URETERAL STENT PLACEMENT Right 2019    Procedure: CYSTOSCOPY, WITH URETERAL STENT INSERTION;  Surgeon: Timmy Santiago IV,  MD;  Location: [facility] OR;  Service: Urology;  Laterality: Right;    CYSTOSCOPY W/ URETERAL STENT REMOVAL  10/04/2019    DILATION AND CURETTAGE OF UTERUS      HYSTERECTOMY      RALH for fibroids (still has ovaries)    INSERTION OF VENOUS ACCESS PORT Left 2019    Procedure: INSERTION, VENOUS ACCESS PORT;  Surgeon: Ulisses Monzon MD;  Location: [facility] OR;  Service: General;  Laterality: Left;  Left internal jugular     LYSIS OF ADHESIONS OF URETER N/A 08/15/2019    Procedure: URETEROLYSIS;  Surgeon: Ismael Juarez MD;  Location: [facility] OR;  Service: OB/GYN;  Laterality: N/A;    OMENTECTOMY N/A 08/15/2019    Procedure: OMENTECTOMY;  Surgeon: Ismael Juarez MD;  Location: [facility];  Service: OB/GYN;  Laterality: N/A;    RETROGRADE PYELOGRAPHY Right 2019    Procedure: PYELOGRAM, RETROGRADE;  Surgeon: Timmy Santiago IV, MD;  Location: [facility] OR;  Service: Urology;  Laterality: Right;    ROBOT-ASSISTED LAPAROSCOPIC SALPINGO-OOPHORECTOMY USING DA TIA XI Bilateral 08/15/2019    Procedure: XI ROBOTIC SALPINGO-OOPHORECTOMY;  Surgeon: Ismael Juarez MD;  Location: [facility];  Service: OB/GYN;  Laterality: Bilateral;    TOTAL REDUCTION MAMMOPLASTY  2018    TUBAL LIGATION           Family History:  Family History   Problem Relation Age of Onset    Glaucoma Mother     No Known Problems Father     Colon cancer Brother     Breast cancer Maternal Aunt     Breast cancer Paternal Aunt     Ovarian cancer Paternal Aunt     Anesthesia problems Other     Thrombophilia Neg Hx        Social History:  Social History     Tobacco Use    Smoking status: Former     Current packs/day: 0.00     Average packs/day: 1 pack/day for 25.0 years (25.0 ttl pk-yrs)     Types: Cigarettes     Start date: 10/1/1993     Quit date: 10/1/2018     Years since quittin.4    Smokeless tobacco: Never    Tobacco comments:     States started quit 2 months ago after 30 years   Substance and Sexual Activity    Alcohol use: Yes     Comment: occasionally  No alcohol  72h prior to sx    Drug use: No    Sexual activity: Not Currently     Partners: Male     Comment: hyst; mut monog        Review of Systems     Review of Systems   Constitutional:  Negative for chills and fever.   HENT:  Negative for sore throat.    Respiratory:  Positive for shortness of breath.    Cardiovascular:  Negative for chest pain.   Gastrointestinal:  Negative for nausea and vomiting.   Genitourinary:  Negative for dysuria.   Musculoskeletal:  Negative for back pain.   Skin:  Negative for rash.   Neurological:  Negative for weakness and headaches.   Hematological:  Does not bruise/bleed easily.   All other systems reviewed and are negative.     Physical Exam     Initial Vitals [03/04/24 0928]   BP Pulse Resp Temp SpO2   128/65 75 20 97.7 °F (36.5 °C) 96 %      MAP       --          Physical Exam  Nursing Notes and Vital Signs Reviewed.  Constitutional: Patient is in no acute distress. Well-developed and well-nourished.  Head: Atraumatic. Normocephalic.  Eyes: PERRL. EOM intact. Conjunctivae are not pale. No scleral icterus.  ENT: Mucous membranes are moist. Oropharynx is clear and symmetric.    Neck: Supple. Full ROM. No lymphadenopathy.  Cardiovascular: Regular rate. Regular rhythm. No murmurs, rubs, or gallops. Distal pulses are 2+ and symmetric.  Pulmonary/Chest: No respiratory distress. Clear to auscultation bilaterally. No wheezing or rales.  Abdominal: Soft and non-distended.  There is no tenderness.  No rebound, guarding, or rigidity. Good bowel sounds.  Genitourinary: No CVA tenderness  Musculoskeletal: Moves all extremities. No obvious deformities. No edema. No calf tenderness.  Skin: Warm and dry.  Neurological:  Alert, awake, and appropriate.  Normal speech.  No acute focal neurological deficits are appreciated.  Psychiatric: Normal affect. Good eye contact. Appropriate in content.     ED Course   Procedures  ED Vital Signs:  Vitals:    03/04/24 0928 03/04/24 1018 03/04/24 1133 03/04/24 1134  "  BP: 128/65 124/68 (!) 142/73    Pulse: 75 73 66    Resp: 20 14  16   Temp: 97.7 °F (36.5 °C)      TempSrc: Oral      SpO2: 96% 95%     Weight: 115.5 kg (254 lb 11.9 oz)      Height: 5' 7" (1.702 m)       03/04/24 1137 03/04/24 1216 03/04/24 1218   BP:   127/64   Pulse:   67   Resp:   15   Temp:      TempSrc:      SpO2: 98% 96%    Weight:      Height:          Abnormal Lab Results:  Labs Reviewed - No data to display     All Lab Results:  Results for orders placed or performed during the hospital encounter of 03/04/24   EKG 12-lead   Result Value Ref Range    QRS Duration 80 ms    [facility] QTC Calculation 438 ms     *Note: Due to a large number of results and/or encounters for the requested time period, some results have not been displayed. A complete set of results can be found in Results Review.         Imaging Results:  Imaging Results              CTA Chest Non-Coronary (PE Studies) (Final result)  Result time 03/04/24 12:37:21      Final result by Chandrakant Fermin MD (03/04/24 12:37:21)                   Impression:      No evidence of pulmonary embolism.    See additional findings above    All CT scans at this facility use dose modulation, iterative reconstruction and/or weight based dosing when appropriate to reduce radiation dose to as low as reasonably achievable.      Electronically signed by: Chandrakant Fermin MD  Date:    03/04/2024  Time:    12:37               Narrative:    EXAMINATION:  CTA CHEST NON CORONARY (PE STUDIES)    CLINICAL HISTORY:  Shortness of breath    TECHNIQUE:  After the intravenous administration of 100 cc of Omni 350 nonionic contrast using CT pulmonary angio technique, 1.25  mm axial images were acquired using helical CT technique from the lung apices through costophrenic sulci.  Axial mips, sagittal and coronal reformats were also submitted for interpretation.    COMPARISON:  Chest x-ray dated 02/24/2024    FINDINGS:  -Pulmonary arteries: Pulmonary arteries are well opacified.  No " evidence of pulmonary embolism.  Prominence of pulmonary artery suggests pulmonary hypertension..  No right heart strain is identified.    -Lungs: No nodules or infiltrates.    -Pleura: No thickening or fluid.    -Mediastinum/Nirmala:No significant adenopathy    -Axilla: No adenopathy.    -Thyroid: Normal lower gland.    -Heart/Aorta: Heart size is mildly enlarged.  No significant  coronary artery disease.  No pericardial effusion. Aorta normal caliber. Tortuosity of the descending aorta can be seen with chronic hypertension.    -Bones/Chest Wall: Intact    -Upper Abdomen: Unremarkable                                       The EKG was ordered, reviewed, and independently interpreted by the ED provider.  Interpretation time: 9:59  Rate: 72 BPM  Rhythm: normal sinus rhythm  Interpretation: No acute ST changes. No STEMI.         The Emergency Provider reviewed the vital signs and test results, which are outlined above.     ED Discussion       1:12 PM: Reassessed pt at this time. Discussed with pt all pertinent ED information and results. Discussed pt dx and plan of tx. Gave pt all f/u and return to the ED instructions. All questions and concerns were addressed at this time. Pt expresses understanding of information and instructions, and is comfortable with plan to discharge. Pt is stable for discharge.     I discussed with patient and/or family/caretaker that evaluation in the ED does not suggest any emergent or life threatening medical conditions requiring immediate intervention beyond what was provided in the ED, and I believe patient is safe for discharge.  Regardless, an unremarkable evaluation in the ED does not preclude the development or presence of a serious of life threatening condition. As such, patient was instructed to return immediately for any worsening or change in current symptoms.       Medical Decision Making  DDX:  1. Abnormal d dimer  2. Deconditioning      CTA obtained in the ER, negative for PE,  pulmonary htn noted, discussed with patient, will refer to pulmonary, no further lab work needed as already had an extensive outpatient workup. Stable for discharge.       Amount and/or Complexity of Data Reviewed  External Data Reviewed: labs, radiology and notes.     Details: Seen by cardiology 2/28 for worsenig dyspnea had abnormal d dimer so sent to the ER for CTA.  Already had extensive outpatient workup, echo, etc. Which were otherwise normal  Labs: ordered. Decision-making details documented in ED Course.  Radiology: ordered and independent interpretation performed. Decision-making details documented in ED Course.  ECG/medicine tests: ordered and independent interpretation performed. Decision-making details documented in ED Course.    Risk  Prescription drug management.  Decision regarding hospitalization.                ED Medication(s):  Medications   iohexoL (OMNIPAQUE 350) injection 100 mL (100 mLs Intravenous Given 3/4/24 1227)       Discharge Medication List as of 3/4/2024  1:08 PM           Follow-up Information       [facility]. Schedule an appointment as soon as possible for a visit in 2 days.    Specialty: Pulmonology  Why: Return to the Emergency room, If symptoms worsen  Contact information:  [address] [place] [id number]  [phone]                               Scribe Attestation:   Scribe #1: I performed the above scribed service and the documentation accurately describes the services I performed. I attest to the accuracy of the note.     Attending:   Physician Attestation Statement for Scribe #1: I, Chrissie Allen MD, personally performed the services described in this documentation, as scribed by Gemini Bean, in my presence, and it is both accurate and complete.           Clinical Impression       ICD-10-CM ICD-9-CM   1. Shortness of breath  R06.02 786.05   2. History of ovarian cancer  Z85.43 V10.43       Disposition:   Disposition:  Discharged  Condition: Stable         Chrissie Allen MD  03/07/24 [id number]

## 2024-03-04 NOTE — TELEPHONE ENCOUNTER
Spoke with patient gave D-Dimer level is elevated. Let her  know to go to ER to get CTA chest to rule out PE. Patient states she will go some time today.    ----- Message from India Ferrara MD sent at 3/4/2024  6:29 AM CST -----  Call patient   D-dimer level is elevated   Patient will need to go to the emergency room to let them know of the elevated D-dimer seen on her labs so that they can do a CTA chest to rule out pulmonary embolus.

## 2024-03-04 NOTE — PROGRESS NOTES
Call patient   D-dimer level is elevated   Patient will need to go to the emergency room to let them know of the elevated D-dimer seen on her labs so that they can do a CTA chest to rule out pulmonary embolus.

## 2024-03-04 NOTE — FIRST PROVIDER EVALUATION
Medical screening examination initiated.  I have conducted a focused provider triage encounter, findings are as follows:    Brief history of present illness:  presents to the er for sob. Pt had elevated d dimer     There were no vitals filed for this visit.    Pertinent physical exam:  nad    Brief workup plan:  labs, imaging, further eval    Preliminary workup initiated; this workup will be continued and followed by the physician or advanced practice provider that is assigned to the patient when roomed.

## 2024-03-04 NOTE — PROVIDER PROGRESS NOTES - EMERGENCY DEPT.
SCRIBE #1 NOTE: I, Gemini Bean, am scribing for, and in the presence of, Chrissie Allen MD. I have scribed the entire note.       History     Chief Complaint   Patient presents with    Abnormal Labs     Pt states her PCP referred her here due to abnormal D Dimer results, possible blood clots.      Review of patient's allergies indicates:  No Known Allergies      History of Present Illness     HPI    3/4/2024, 10:10 AM  History obtained from the patient      History of Present Illness: Casie Gaines is a 68 y.o. female patient with a PMHx of HTN, CHF, HLD, anemia, and ovarian cancer who presents to the Emergency Department for evaluation for her elevated D-dimer result on  lab work. Pt reports having shortness of breath for weeks. Symptoms are constant and moderate in severity. No mitigating or exacerbating factors reported. No associated sxs reported. Patient denies any fever, nausea, vomiting, headache, and all other sxs at this time. No Prior Tx reported. No further complaints or concerns at this time.       Arrival mode: Personal vehicle  PCP: Bell, Primary Doctor        Past Medical History:  Past Medical History:   Diagnosis Date    Anemia     Anxiety     Arthritis     knees    Cancer     ovarian    CHF (congestive heart failure)     Hx antineoplastic chemotherapy     last 2019    Hyperlipemia     Hypertension     Neck pain     Ovarian cancer 2019    CHEMO    Peritoneal carcinomatosis 2019       Past Surgical History:  Past Surgical History:   Procedure Laterality Date    breast reduction  10/02/2018    CATARACT EXTRACTION Bilateral     2023     SECTION      X 1    COLONOSCOPY N/A 2021    Procedure: COLONOSCOPY;  Surgeon: Paulette Rojas MD;  Location: [facility];  Service: Gastroenterology;  Laterality: N/A;    CYSTOSCOPY W/ URETERAL STENT PLACEMENT Right 2019    Procedure: CYSTOSCOPY, WITH URETERAL STENT INSERTION;  Surgeon: Timmy Santiago IV, MD;   Location: [facility] OR;  Service: Urology;  Laterality: Right;    CYSTOSCOPY W/ URETERAL STENT REMOVAL  10/04/2019    DILATION AND CURETTAGE OF UTERUS      HYSTERECTOMY      RALH for fibroids (still has ovaries)    INSERTION OF VENOUS ACCESS PORT Left 2019    Procedure: INSERTION, VENOUS ACCESS PORT;  Surgeon: Ulisses Monzon MD;  Location: [facility] OR;  Service: General;  Laterality: Left;  Left internal jugular     LYSIS OF ADHESIONS OF URETER N/A 08/15/2019    Procedure: URETEROLYSIS;  Surgeon: Ismael Juarez MD;  Location: [facility];  Service: OB/GYN;  Laterality: N/A;    OMENTECTOMY N/A 08/15/2019    Procedure: OMENTECTOMY;  Surgeon: Ismael Juarez MD;  Location: [facility];  Service: OB/GYN;  Laterality: N/A;    RETROGRADE PYELOGRAPHY Right 2019    Procedure: PYELOGRAM, RETROGRADE;  Surgeon: Timmy Santiago IV, MD;  Location: [facility] OR;  Service: Urology;  Laterality: Right;    ROBOT-ASSISTED LAPAROSCOPIC SALPINGO-OOPHORECTOMY USING DA TIA XI Bilateral 08/15/2019    Procedure: XI ROBOTIC SALPINGO-OOPHORECTOMY;  Surgeon: Ismael Juarez MD;  Location: [facility];  Service: OB/GYN;  Laterality: Bilateral;    TOTAL REDUCTION MAMMOPLASTY  2018    TUBAL LIGATION           Family History:  Family History   Problem Relation Age of Onset    Glaucoma Mother     No Known Problems Father     Colon cancer Brother     Breast cancer Maternal Aunt     Breast cancer Paternal Aunt     Ovarian cancer Paternal Aunt     Anesthesia problems Other     Thrombophilia Neg Hx        Social History:  Social History     Tobacco Use    Smoking status: Former     Current packs/day: 0.00     Average packs/day: 1 pack/day for 25.0 years (25.0 ttl pk-yrs)     Types: Cigarettes     Start date: 10/1/1993     Quit date: 10/1/2018     Years since quittin.4    Smokeless tobacco: Never    Tobacco comments:     States started quit 2 months ago after 30 years   Substance and Sexual Activity    Alcohol use: Yes     Comment: occasionally  No alcohol 72h  prior to sx    Drug use: No    Sexual activity: Not Currently     Partners: Male     Comment: hyst; mut monog        Review of Systems     Review of Systems   Constitutional:  Negative for chills and fever.   HENT:  Negative for sore throat.    Respiratory:  Positive for shortness of breath.    Cardiovascular:  Negative for chest pain.   Gastrointestinal:  Negative for nausea and vomiting.   Genitourinary:  Negative for dysuria.   Musculoskeletal:  Negative for back pain.   Skin:  Negative for rash.   Neurological:  Negative for weakness and headaches.   Hematological:  Does not bruise/bleed easily.   All other systems reviewed and are negative.     Physical Exam     Initial Vitals [03/04/24 0928]   BP Pulse Resp Temp SpO2   128/65 75 20 97.7 °F (36.5 °C) 96 %      MAP       --          Physical Exam  Nursing Notes and Vital Signs Reviewed.  Constitutional: Patient is in no acute distress. Well-developed and well-nourished.  Head: Atraumatic. Normocephalic.  Eyes: PERRL. EOM intact. Conjunctivae are not pale. No scleral icterus.  ENT: Mucous membranes are moist. Oropharynx is clear and symmetric.    Neck: Supple. Full ROM. No lymphadenopathy.  Cardiovascular: Regular rate. Regular rhythm. No murmurs, rubs, or gallops. Distal pulses are 2+ and symmetric.  Pulmonary/Chest: No respiratory distress. Clear to auscultation bilaterally. No wheezing or rales.  Abdominal: Soft and non-distended.  There is no tenderness.  No rebound, guarding, or rigidity. Good bowel sounds.  Genitourinary: No CVA tenderness  Musculoskeletal: Moves all extremities. No obvious deformities. No edema. No calf tenderness.  Skin: Warm and dry.  Neurological:  Alert, awake, and appropriate.  Normal speech.  No acute focal neurological deficits are appreciated.  Psychiatric: Normal affect. Good eye contact. Appropriate in content.     ED Course   Procedures  ED Vital Signs:  Vitals:    03/04/24 0928 03/04/24 1018 03/04/24 1133 03/04/24 1134   BP:  "128/65 124/68 (!) 142/73    Pulse: 75 73 66    Resp: 20 14  16   Temp: 97.7 °F (36.5 °C)      TempSrc: Oral      SpO2: 96% 95%     Weight: 115.5 kg (254 lb 11.9 oz)      Height: 5' 7" (1.702 m)       03/04/24 1137   BP:    Pulse:    Resp:    Temp:    TempSrc:    SpO2: 98%   Weight:    Height:        Abnormal Lab Results:  Labs Reviewed - No data to display           Imaging Results:  Imaging Results              CTA Chest Non-Coronary (PE Studies) (Final result)  Result time 03/04/24 12:37:21      Final result by Chandrakant Fermin MD (03/04/24 12:37:21)                   Impression:      No evidence of pulmonary embolism.    See additional findings above    All CT scans at this facility use dose modulation, iterative reconstruction and/or weight based dosing when appropriate to reduce radiation dose to as low as reasonably achievable.      Electronically signed by: Chandrakant Fermin MD  Date:    03/04/2024  Time:    12:37               Narrative:    EXAMINATION:  CTA CHEST NON CORONARY (PE STUDIES)    CLINICAL HISTORY:  Shortness of breath    TECHNIQUE:  After the intravenous administration of 100 cc of Omni 350 nonionic contrast using CT pulmonary angio technique, 1.25  mm axial images were acquired using helical CT technique from the lung apices through costophrenic sulci.  Axial mips, sagittal and coronal reformats were also submitted for interpretation.    COMPARISON:  Chest x-ray dated 02/24/2024    FINDINGS:  -Pulmonary arteries: Pulmonary arteries are well opacified.  No evidence of pulmonary embolism.  Prominence of pulmonary artery suggests pulmonary hypertension..  No right heart strain is identified.    -Lungs: No nodules or infiltrates.    -Pleura: No thickening or fluid.    -Mediastinum/Nirmala:No significant adenopathy    -Axilla: No adenopathy.    -Thyroid: Normal lower gland.    -Heart/Aorta: Heart size is mildly enlarged.  No significant  coronary artery disease.  No pericardial effusion. Aorta normal " caliber. Tortuosity of the descending aorta can be seen with chronic hypertension.    -Bones/Chest Wall: Intact    -Upper Abdomen: Unremarkable                                       The EKG was ordered, reviewed, and independently interpreted by the ED provider.  Interpretation time: 9:59  Rate: 72 BPM  Rhythm: normal sinus rhythm  Interpretation: No acute ST changes. No STEMI.             The Emergency Provider reviewed the vital signs and test results, which are outlined above.     ED Discussion       1:12 PM: Reassessed pt at this time. Discussed with pt all pertinent ED information and results. Discussed pt dx and plan of tx. Gave pt all f/u and return to the ED instructions. All questions and concerns were addressed at this time. Pt expresses understanding of information and instructions, and is comfortable with plan to discharge. Pt is stable for discharge.    I discussed with patient and/or family/caretaker that evaluation in the ED does not suggest any emergent or life threatening medical conditions requiring immediate intervention beyond what was provided in the ED, and I believe patient is safe for discharge.  Regardless, an unremarkable evaluation in the ED does not preclude the development or presence of a serious of life threatening condition. As such, patient was instructed to return immediately for any worsening or change in current symptoms.         Medical Decision Making  Amount and/or Complexity of Data Reviewed  Radiology: ordered and independent interpretation performed. Decision-making details documented in ED Course.  ECG/medicine tests: ordered and independent interpretation performed. Decision-making details documented in ED Course.    Risk  Prescription drug management.                ED Medication(s):  Medications   iohexoL (OMNIPAQUE 350) injection 100 mL (100 mLs Intravenous Given 3/4/24 7659)       New Prescriptions    No medications on file        Follow-up Information       [facility]ULMON[facility]. Schedule an appointment as soon as possible for a visit in 2 days.    Specialty: Pulmonology  Why: Return to the Emergency room, If symptoms worsen  Contact information:  [address] [facility] [address]  [phone]                               Scribe Attestation:   Scribe #1: I performed the above scribed service and the documentation accurately describes the services I performed. I attest to the accuracy of the note.     Attending:   Physician Attestation Statement for Scribe #1: I, Chrissie Allen MD, personally performed the services described in this documentation, as scribed by Gemini Bean, in my presence, and it is both accurate and complete.           Clinical Impression       ICD-10-CM ICD-9-CM   1. Shortness of breath  R06.02 786.05   2. History of ovarian cancer  Z85.43 V10.43       Disposition:   Disposition: Discharged  Condition: Stable

## 2024-03-05 ENCOUNTER — PATIENT MESSAGE (OUTPATIENT)
Dept: PULMONOLOGY | Facility: CLINIC | Age: 69
End: 2024-03-05
Payer: MEDICARE

## 2024-03-06 ENCOUNTER — LAB VISIT (OUTPATIENT)
Dept: LAB | Facility: HOSPITAL | Age: 69
End: 2024-03-06
Attending: INTERNAL MEDICINE
Payer: MEDICARE

## 2024-03-06 ENCOUNTER — OFFICE VISIT (OUTPATIENT)
Dept: HEMATOLOGY/ONCOLOGY | Facility: CLINIC | Age: 69
End: 2024-03-06
Payer: MEDICARE

## 2024-03-06 DIAGNOSIS — Z90.722 S/P BSO (BILATERAL SALPINGO-OOPHORECTOMY): ICD-10-CM

## 2024-03-06 DIAGNOSIS — C56.1 MALIGNANT NEOPLASM OF RIGHT OVARY: ICD-10-CM

## 2024-03-06 DIAGNOSIS — R59.1 LYMPHADENOPATHY: ICD-10-CM

## 2024-03-06 DIAGNOSIS — C78.6 PERITONEAL CARCINOMATOSIS: Primary | ICD-10-CM

## 2024-03-06 DIAGNOSIS — C56.3 MALIGNANT NEOPLASM OF BOTH OVARIES: ICD-10-CM

## 2024-03-06 DIAGNOSIS — G62.9 NEUROPATHY: ICD-10-CM

## 2024-03-06 DIAGNOSIS — C56.3 OVARIAN CANCER, BILATERAL: ICD-10-CM

## 2024-03-06 LAB
ALBUMIN SERPL BCP-MCNC: 3.7 G/DL (ref 3.5–5.2)
ALP SERPL-CCNC: 120 U/L (ref 55–135)
ALT SERPL W/O P-5'-P-CCNC: 22 U/L (ref 10–44)
ANION GAP SERPL CALC-SCNC: 10 MMOL/L (ref 8–16)
AST SERPL-CCNC: 23 U/L (ref 10–40)
BASOPHILS # BLD AUTO: 0.03 K/UL (ref 0–0.2)
BASOPHILS NFR BLD: 0.5 % (ref 0–1.9)
BILIRUB SERPL-MCNC: 0.4 MG/DL (ref 0.1–1)
BUN SERPL-MCNC: 12 MG/DL (ref 8–23)
CALCIUM SERPL-MCNC: 9.4 MG/DL (ref 8.7–10.5)
CHLORIDE SERPL-SCNC: 104 MMOL/L (ref 95–110)
CO2 SERPL-SCNC: 29 MMOL/L (ref 23–29)
CREAT SERPL-MCNC: 0.8 MG/DL (ref 0.5–1.4)
DIFFERENTIAL METHOD BLD: ABNORMAL
EOSINOPHIL # BLD AUTO: 0.2 K/UL (ref 0–0.5)
EOSINOPHIL NFR BLD: 2.4 % (ref 0–8)
ERYTHROCYTE [DISTWIDTH] IN BLOOD BY AUTOMATED COUNT: 16 % (ref 11.5–14.5)
EST. GFR  (NO RACE VARIABLE): >60 ML/MIN/1.73 M^2
GLUCOSE SERPL-MCNC: 113 MG/DL (ref 70–110)
HCT VFR BLD AUTO: 32 % (ref 37–48.5)
HGB BLD-MCNC: 10.2 G/DL (ref 12–16)
IMM GRANULOCYTES # BLD AUTO: 0.02 K/UL (ref 0–0.04)
IMM GRANULOCYTES NFR BLD AUTO: 0.3 % (ref 0–0.5)
LYMPHOCYTES # BLD AUTO: 1.8 K/UL (ref 1–4.8)
LYMPHOCYTES NFR BLD: 28.7 % (ref 18–48)
MAGNESIUM SERPL-MCNC: 1.1 MG/DL (ref 1.6–2.6)
MCH RBC QN AUTO: 28.9 PG (ref 27–31)
MCHC RBC AUTO-ENTMCNC: 31.9 G/DL (ref 32–36)
MCV RBC AUTO: 91 FL (ref 82–98)
MONOCYTES # BLD AUTO: 0.4 K/UL (ref 0.3–1)
MONOCYTES NFR BLD: 5.8 % (ref 4–15)
NEUTROPHILS # BLD AUTO: 4 K/UL (ref 1.8–7.7)
NEUTROPHILS NFR BLD: 62.3 % (ref 38–73)
NRBC BLD-RTO: 0 /100 WBC
PHOSPHATE SERPL-MCNC: 3.1 MG/DL (ref 2.7–4.5)
PLATELET # BLD AUTO: 170 K/UL (ref 150–450)
PMV BLD AUTO: 9.1 FL (ref 9.2–12.9)
POTASSIUM SERPL-SCNC: 3.4 MMOL/L (ref 3.5–5.1)
PROT SERPL-MCNC: 7 G/DL (ref 6–8.4)
RBC # BLD AUTO: 3.53 M/UL (ref 4–5.4)
SODIUM SERPL-SCNC: 143 MMOL/L (ref 136–145)
WBC # BLD AUTO: 6.37 K/UL (ref 3.9–12.7)

## 2024-03-06 PROCEDURE — 83735 ASSAY OF MAGNESIUM: CPT | Performed by: INTERNAL MEDICINE

## 2024-03-06 PROCEDURE — 85025 COMPLETE CBC W/AUTO DIFF WBC: CPT | Performed by: INTERNAL MEDICINE

## 2024-03-06 PROCEDURE — 99214 OFFICE O/P EST MOD 30 MIN: CPT | Mod: 95,,, | Performed by: INTERNAL MEDICINE

## 2024-03-06 PROCEDURE — 84100 ASSAY OF PHOSPHORUS: CPT | Performed by: INTERNAL MEDICINE

## 2024-03-06 PROCEDURE — 80053 COMPREHEN METABOLIC PANEL: CPT | Performed by: INTERNAL MEDICINE

## 2024-03-06 PROCEDURE — 36415 COLL VENOUS BLD VENIPUNCTURE: CPT | Performed by: INTERNAL MEDICINE

## 2024-03-06 RX ORDER — GABAPENTIN 300 MG/1
300 CAPSULE ORAL 3 TIMES DAILY
Qty: 90 CAPSULE | Refills: 3 | Status: ON HOLD | OUTPATIENT
Start: 2024-03-06 | End: 2024-05-20

## 2024-03-06 NOTE — PROGRESS NOTES
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 female who presents to clinic for follow up.    She underwent desentation with carboplatin and Taxol  and post treatment Pet showed some LAD that is mildly avid.  Discussed no evidence based roll for maintenance therapy in her.  We will continue to monitor with sooner interval imaging.    Review of Systems:  Review of Systems   Constitutional:  Negative for activity change, appetite change, chills, diaphoresis, fatigue, fever and unexpected weight change.   HENT:  Negative for nosebleeds.    Respiratory:  Negative for shortness of breath.    Cardiovascular:  Negative for chest pain.   Gastrointestinal:  Negative for abdominal distention, abdominal pain, anal bleeding, blood in stool, constipation, diarrhea, nausea and vomiting.   Genitourinary:  Negative for difficulty urinating and hematuria.   Musculoskeletal:  Negative for arthralgias, back pain and myalgias.   Skin:  Negative for rash.   Neurological:  Negative for dizziness, weakness, light-headedness and headaches.   Hematological:  Does not bruise/bleed easily.   Psychiatric/Behavioral:  The patient is not nervous/anxious.      History     Oncology History   Peritoneal carcinomatosis   1/26/2019 Initial Diagnosis    Peritoneal carcinomatosis     2/15/2019 - 7/8/2019 Chemotherapy    Treatment Summary   Plan Name: OP GYN PACLITAXEL CARBOPLATIN (AUC 6)  Q3W  Treatment Goal: Palliative  Status: Inactive  Start Date: 2/28/2019  End Date: 6/18/2019  Provider: Will Trevizo Jr., MD  Chemotherapy: bevacizumab (AVASTIN) 15 mg/kg = 1,600 mg in sodium chloride 0.9% 100 mL chemo infusion, 15 mg/kg = 1,600 mg (100 % of original dose 15 mg/kg), Intravenous, Clinic/[facility] 1 time, 6 of 6 cycles  Dose modification: 15 mg/kg (original dose 15 mg/kg, Cycle 1)  Administration: 1,600 mg (2/28/2019), 1,600 mg (3/21/2019), 1,600 mg (4/11/2019), 1,600 mg (5/7/2019), 1,600 mg (5/28/2019), 1,510 mg (6/18/2019)  CARBOplatin (PARAPLATIN) 870 mg in sodium chloride 0.9% 337 mL chemo infusion, 870 mg (100 % of original dose 868.8 mg), Intravenous, Clinic/HOD 1 time, 6 of 6 cycles  Dose modification:   (original dose 868.8 mg, Cycle 1)  Administration: 870 mg (2/28/2019), 870 mg (3/21/2019), 900 mg (4/11/2019), 870 mg (5/7/2019), 660 mg (5/28/2019), 690 mg (6/18/2019)  PACLitaxel (TAXOL) 175 mg/m2 = 396 mg in sodium chloride 0.9% 566 mL chemo infusion, 175 mg/m2 = 396 mg, Intravenous, Clinic/[facility] 1 time, 6 of 6 cycles  Dose modification: 140 mg/m2 (80 % of original dose 175 mg/m2, Cycle 5)  Administration: 396 mg (2/28/2019), 396 mg (3/21/2019), 396 mg (4/11/2019), 396 mg (5/7/2019), 318 mg (5/28/2019), 306 mg (6/18/2019)     10/9/2019 - 9/24/2020 Chemotherapy    Treatment Summary   Plan Name: OP BEVACIZUMAB Q3W   Treatment Goal: Maintenance  Status: Inactive  Start Date: 10/9/2019  End Date: 9/24/2020  Provider: Oscar Mckeon MD  Chemotherapy: dexAMETHasone tablet 8 mg, 8 mg (100 % of original dose 8 mg), Oral, Clinic/[facility] 1 time, 2 of 8 cycles  Dose modification: 8 mg (original dose 8 mg, Cycle 8), 8 mg (original dose 8 mg, Cycle 12)  Administration: 8 mg (7/22/2020), 8 mg (8/13/2020)  bevacizumab (AVASTIN) 15 mg/kg = 1,615 mg in sodium chloride 0.9% 100 mL chemo infusion, 15 mg/kg = 1,615 mg, Intravenous, Clinic/[facility] 1 time, 11 of 17 cycles  Administration: 1,615 mg (10/9/2019), 1,615 mg  (10/29/2019), 1,615 mg (12/10/2019), 1,615 mg (1/21/2020), 1,600 mg (4/2/2020), 1,615 mg (4/23/2020), 1,600 mg (7/1/2020), 1,600 mg (7/22/2020), 1,600 mg (8/13/2020), 1,795 mg (9/3/2020), 1,795 mg (9/24/2020)     9/29/2023 -  Chemotherapy    Treatment Summary   Plan Name: OP GYN PACLITAXEL CARBOPLATIN desensitization (AUC 5) Q3W  Treatment Goal: Control  Status: Active  Start Date: 9/29/2023  End Date: 10/2/2024 (Planned)  Provider: Ismael Juarez MD  Chemotherapy: CARBOplatin (PARAPLATIN) 650 mg in sodium chloride 0.9% 335 mL chemo infusion, 650 mg (100 % of original dose 648 mg), Intravenous, Clinic/H[facility] 1 time, 6 of 17 cycles  Dose modification:   (original dose 648 mg, Cycle 1),   (original dose 853.8 mg, Cycle 2),   (original dose 730 mg, Cycle 3),   (original dose 730 mg, Cycle 4), 5 mg (original dose 730 mg, Cycle 4), 7.3 mg (original dose 730 mg, Cycle 4), 5 mg (original dose 730 mg, Cycle 4, Reason: MD Discretion, Comment: desensitization), 73 mg (original dose 730 mg, Cycle 4), 720 mg (original dose 730 mg, Cycle 4, Reason: MD Discretion, Comment: desensitization), 648.97 mg (original dose 730 mg, Cycle 4, Reason: MD Discretion, Comment: desensitization), 0.61 mg (original dose 730 mg, Cycle 5, Reason: MD Discretion, Comment: desensitization), 5 mg (original dose 730 mg, Cycle 5, Reason: MD Discretion, Comment: desens), 60.5 mg (original dose 730 mg, Cycle 5, Reason: MD Discretion, Comment: desensitization),   (original dose 730 mg, Cycle 5, Reason: MD Discretion, Comment: new scr), 6.05 mg (original dose 730 mg, Cycle 5, Reason: Dose not tolerated, Comment: desensitization)  Administration: 650 mg (9/29/2023), 730 mg (11/10/2023), 710 mg (10/20/2023), 5 mg (1/3/2024), 5 mg (1/3/2024), 75 mg (1/3/2024), 650 mg (1/3/2024), 5 mg (2/14/2024), 5 mg (2/14/2024), 75 mg (2/14/2024), 650 mg (2/14/2024), 5 mg (1/24/2024), 60 mg (1/24/2024), 605 mg (1/24/2024), 5 mg (1/24/2024)  PACLitaxeL (TAXOL) 175 mg/m2 = 402 mg  in sodium chloride 0.9% 500 mL chemo infusion, 175 mg/m2 = 402 mg, Intravenous, Clinic/[facility] 1 time, 6 of 17 cycles  Administration: 402 mg (9/29/2023), 402 mg (10/20/2023), 408 mg (11/10/2023), 408 mg (1/3/2024), 408 mg (1/24/2024), 408 mg (2/14/2024)     Malignant neoplasm of right ovary (Resolved)   2/15/2019 Initial Diagnosis    Malignant neoplasm of right ovary     2/15/2019 - 7/8/2019 Chemotherapy    Treatment Summary   Plan Name: OP GYN PACLITAXEL CARBOPLATIN (AUC 6) Q3W  Treatment Goal: Palliative  Status: Inactive  Start Date: 2/28/2019  End Date: 6/18/2019  Provider: Will Trevizo Jr., MD  Chemotherapy: bevacizumab (AVASTIN) 15 mg/kg = 1,600 mg in sodium chloride 0.9% 100 mL chemo infusion, 15 mg/kg = 1,600 mg (100 % of original dose 15 mg/kg), Intravenous, Clinic/[facility] 1 time, 6 of 6 cycles  Dose modification: 15 mg/kg (original dose 15 mg/kg, Cycle 1)  Administration: 1,600 mg (2/28/2019), 1,600 mg (3/21/2019), 1,600 mg (4/11/2019), 1,600 mg (5/7/2019), 1,600 mg (5/28/2019), 1,510 mg (6/18/2019)  CARBOplatin (PARAPLATIN) 870 mg in sodium chloride 0.9% 337 mL chemo infusion, 870 mg (100 % of original dose 868.8 mg), Intravenous, Clinic/[facility]OD 1 time, 6 of 6 cycles  Dose modification:   (original dose 868.8 mg, Cycle 1)  Administration: 870 mg (2/28/2019), 870 mg (3/21/2019), 900 mg (4/11/2019), 870 mg (5/7/2019), 660 mg (5/28/2019), 690 mg (6/18/2019)  PACLitaxel (TAXOL) 175 mg/m2 = 396 mg in sodium chloride 0.9% 566 mL chemo infusion, 175 mg/m2 = 396 mg, Intravenous, Clinic/HOD 1 time, 6 of 6 cycles  Dose modification: 140 mg/m2 (80 % of original dose 175 mg/m2, Cycle 5)  Administration: 396 mg (2/28/2019), 396 mg (3/21/2019), 396 mg (4/11/2019), 396 mg (5/7/2019), 318 mg (5/28/2019), 306 mg (6/18/2019)     10/9/2019 - 9/24/2020 Chemotherapy    Treatment Summary   Plan Name: OP BEVACIZUMAB Q3W   Treatment Goal: Maintenance  Status: Inactive  Start Date: 10/9/2019  End Date: 9/24/2020  Provider: Oscar Mckeon  MD  Chemotherapy: dexAMETHasone tablet 8 mg, 8 mg (100 % of original dose 8 mg), Oral, Clinic/HOD 1 time, 2 of 8 cycles  Dose modification: 8 mg (original dose 8 mg, Cycle 8), 8 mg (original dose 8 mg, Cycle 12)  Administration: 8 mg (7/22/2020), 8 mg (8/13/2020)  bevacizumab (AVASTIN) 15 mg/kg = 1,615 mg in sodium chloride 0.9% 100 mL chemo infusion, 15 mg/kg = 1,615 mg, Intravenous, Clinic/HOD 1 time, 11 of 17 cycles  Administration: 1,615 mg (10/9/2019), 1,615 mg (10/29/2019), 1,615 mg (12/10/2019), 1,615 mg (1/21/2020), 1,600 mg (4/2/2020), 1,615 mg (4/23/2020), 1,600 mg (7/1/2020), 1,600 mg (7/22/2020), 1,600 mg (8/13/2020), 1,795 mg (9/3/2020), 1,795 mg (9/24/2020)     Malignant neoplasm of both ovaries (Resolved)   2/18/2019 Initial Diagnosis    Ovarian cancer     10/9/2019 - 9/24/2020 Chemotherapy    Treatment Summary   Plan Name: OP BEVACIZUMAB Q3W   Treatment Goal: Maintenance  Status: Inactive  Start Date: 10/9/2019  End Date: 9/24/2020  Provider: Oscar Mckeon MD  Chemotherapy: dexAMETHasone tablet 8 mg, 8 mg (100 % of original dose 8 mg), Oral, Clinic/HOD 1 time, 2 of 8 cycles  Dose modification: 8 mg (original dose 8 mg, Cycle 8), 8 mg (original dose 8 mg, Cycle 12)  Administration: 8 mg (7/22/2020), 8 mg (8/13/2020)  bevacizumab (AVASTIN) 15 mg/kg = 1,615 mg in sodium chloride 0.9% 100 mL chemo infusion, 15 mg/kg = 1,615 mg, Intravenous, Clinic/HOD 1 time, 11 of 17 cycles  Administration: 1,615 mg (10/9/2019), 1,615 mg (10/29/2019), 1,615 mg (12/10/2019), 1,615 mg (1/21/2020), 1,600 mg (4/2/2020), 1,615 mg (4/23/2020), 1,600 mg (7/1/2020), 1,600 mg (7/22/2020), 1,600 mg (8/13/2020), 1,795 mg (9/3/2020), 1,795 mg (9/24/2020)     Ovarian cancer, bilateral   8/15/2019 Initial Diagnosis    Ovarian cancer, bilateral     10/9/2019 - 9/24/2020 Chemotherapy    Treatment Summary   Plan Name: OP BEVACIZUMAB Q3W   Treatment Goal: Maintenance  Status: Inactive  Start Date: 10/9/2019  End Date:  9/24/2020  Provider: Oscar Mckeon MD  Chemotherapy: dexAMETHasone tablet 8 mg, 8 mg (100 % of original dose 8 mg), Oral, Clinic/HOD 1 time, 2 of 8 cycles  Dose modification: 8 mg (original dose 8 mg, Cycle 8), 8 mg (original dose 8 mg, Cycle 12)  Administration: 8 mg (7/22/2020), 8 mg (8/13/2020)  bevacizumab (AVASTIN) 15 mg/kg = 1,615 mg in sodium chloride 0.9% 100 mL chemo infusion, 15 mg/kg = 1,615 mg, Intravenous, Clinic/HOD 1 time, 11 of 17 cycles  Administration: 1,615 mg (10/9/2019), 1,615 mg (10/29/2019), 1,615 mg (12/10/2019), 1,615 mg (1/21/2020), 1,600 mg (4/2/2020), 1,615 mg (4/23/2020), 1,600 mg (7/1/2020), 1,600 mg (7/22/2020), 1,600 mg (8/13/2020), 1,795 mg (9/3/2020), 1,795 mg (9/24/2020)     9/29/2023 -  Chemotherapy    Treatment Summary   Plan Name: OP GYN PACLITAXEL CARBOPLATIN desensitization (AUC 5) Q3W  Treatment Goal: Control  Status: Active  Start Date: 9/29/2023  End Date: 10/2/2024 (Planned)  Provider: Ismael Juarez MD  Chemotherapy: CARBOplatin (PARAPLATIN) 650 mg in sodium chloride 0.9% 335 mL chemo infusion, 650 mg (100 % of original dose 648 mg), Intravenous, Clinic/HOD 1 time, 6 of 17 cycles  Dose modification:   (original dose 648 mg, Cycle 1),   (original dose 853.8 mg, Cycle 2),   (original dose 730 mg, Cycle 3),   (original dose 730 mg, Cycle 4), 5 mg (original dose 730 mg, Cycle 4), 7.3 mg (original dose 730 mg, Cycle 4), 5 mg (original dose 730 mg, Cycle 4, Reason: MD Discretion, Comment: desensitization), 73 mg (original dose 730 mg, Cycle 4), 720 mg (original dose 730 mg, Cycle 4, Reason: MD Discretion, Comment: desensitization), 648.97 mg (original dose 730 mg, Cycle 4, Reason: MD Discretion, Comment: desensitization), 0.61 mg (original dose 730 mg, Cycle 5, Reason: MD Discretion, Comment: desensitization), 5 mg (original dose 730 mg, Cycle 5, Reason: MD Discretion, Comment: desens), 60.5 mg (original dose 730 mg, Cycle 5, Reason: MD Discretion, Comment: desensitization),    (original dose 730 mg, Cycle 5, Reason: MD Discretion, Comment: new scr), 6.05 mg (original dose 730 mg, Cycle 5, Reason: Dose not tolerated, Comment: desensitization)  Administration: 650 mg (2023), 730 mg (11/10/2023), 710 mg (10/20/2023), 5 mg (1/3/2024), 5 mg (1/3/2024), 75 mg (1/3/2024), 650 mg (1/3/2024), 5 mg (2024), 5 mg (2024), 75 mg (2024), 650 mg (2024), 5 mg (2024), 60 mg (2024), 605 mg (2024), 5 mg (2024)  PACLitaxeL (TAXOL) 175 mg/m2 = 402 mg in sodium chloride 0.9% 500 mL chemo infusion, 175 mg/m2 = 402 mg, Intravenous, Clinic/[facility] 1 time, 6 of 17 cycles  Administration: 402 mg (2023), 402 mg (10/20/2023), 408 mg (11/10/2023), 408 mg (1/3/2024), 408 mg (2024), 408 mg (2024)           Past Medical History:   Diagnosis Date    Anemia     Anxiety     Arthritis     knees    Cancer     ovarian    CHF (congestive heart failure)     Hx antineoplastic chemotherapy     last 2019    Hyperlipemia     Hypertension     Neck pain     Ovarian cancer 2019    CHEMO    Peritoneal carcinomatosis 2019       Past Surgical History:   Procedure Laterality Date    breast reduction  10/02/2018    CATARACT EXTRACTION Bilateral     2023     SECTION      X 1    COLONOSCOPY N/A 2021    Procedure: COLONOSCOPY;  Surgeon: Paulette Rojas MD;  Location: [facility] ENDO;  Service: Gastroenterology;  Laterality: N/A;    CYSTOSCOPY W/ URETERAL STENT PLACEMENT Right 2019    Procedure: CYSTOSCOPY, WITH URETERAL STENT INSERTION;  Surgeon: Timmy Santiago IV, MD;  Location: [facility] OR;  Service: Urology;  Laterality: Right;    CYSTOSCOPY W/ URETERAL STENT REMOVAL  10/04/2019    DILATION AND CURETTAGE OF UTERUS      HYSTERECTOMY      RALH for fibroids (still has ovaries)    INSERTION OF VENOUS ACCESS PORT Left 2019    Procedure: INSERTION, VENOUS ACCESS PORT;  Surgeon: Ulisses Monzon MD;  Location: [facility];  Service: General;  Laterality: Left;   Left internal jugular     LYSIS OF ADHESIONS OF URETER N/A 08/15/2019    Procedure: URETEROLYSIS;  Surgeon: Ismael Juarez MD;  Location: [facility] OR;  Service: OB/GYN;  Laterality: N/A;    OMENTECTOMY N/A 08/15/2019    Procedure: OMENTECTOMY;  Surgeon: Ismael Juarez MD;  Location: [facility] OR;  Service: OB/GYN;  Laterality: N/A;    RETROGRADE PYELOGRAPHY Right 2019    Procedure: PYELOGRAM, RETROGRADE;  Surgeon: Timmy Santiago IV, MD;  Location: [facility] OR;  Service: Urology;  Laterality: Right;    ROBOT-ASSISTED LAPAROSCOPIC SALPINGO-OOPHORECTOMY USING DA TIA XI Bilateral 08/15/2019    Procedure: XI ROBOTIC SALPINGO-OOPHORECTOMY;  Surgeon: Ismael Juarez MD;  Location: [facility] OR;  Service: OB/GYN;  Laterality: Bilateral;    TOTAL REDUCTION MAMMOPLASTY  2018    TUBAL LIGATION         Family History   Problem Relation Age of Onset    Glaucoma Mother     No Known Problems Father     Colon cancer Brother     Breast cancer Maternal Aunt     Breast cancer Paternal Aunt     Ovarian cancer Paternal Aunt     Anesthesia problems Other     Thrombophilia Neg Hx        Review of patient's allergies indicates:  No Known Allergies    Social History     Tobacco Use    Smoking status: Former     Current packs/day: 0.00     Average packs/day: 1 pack/day for 25.0 years (25.0 ttl pk-yrs)     Types: Cigarettes     Start date: 10/1/1993     Quit date: 10/1/2018     Years since quittin.4    Smokeless tobacco: Never    Tobacco comments:     States started quit 2 months ago after 30 years   Substance Use Topics    Alcohol use: Yes     Comment: occasionally  No alcohol 72h prior to sx    Drug use: No     Labs   Labs:  Lab Visit on 2024   Component Date Value Ref Range Status    Phosphorus 2024 3.1  2.7 - 4.5 mg/dL Final    Magnesium 2024 1.1 (L)  1.6 - 2.6 mg/dL Final    Sodium 2024 143  136 - 145 mmol/L Final    Potassium 2024 3.4 (L)  3.5 - 5.1 mmol/L Final    Chloride 2024 104  95 - 110 mmol/L Final     CO2 03/06/2024 29  23 - 29 mmol/L Final    Glucose 03/06/2024 113 (H)  70 - 110 mg/dL Final    BUN 03/06/2024 12  8 - 23 mg/dL Final    Creatinine 03/06/2024 0.8  0.5 - 1.4 mg/dL Final    Calcium 03/06/2024 9.4  8.7 - 10.5 mg/dL Final    Total Protein 03/06/2024 7.0  6.0 - 8.4 g/dL Final    Albumin 03/06/2024 3.7  3.5 - 5.2 g/dL Final    Total Bilirubin 03/06/2024 0.4  0.1 - 1.0 mg/dL Final    Comment: For infants and newborns, interpretation of results should be based  on gestational age, weight and in agreement with clinical  observations.    Premature Infant recommended reference ranges:  Up to 24 hours.............<8.0 mg/dL  Up to 48 hours............<12.0 mg/dL  3-5 days..................<15.0 mg/dL  6-29 days.................<15.0 mg/dL      Alkaline Phosphatase 03/06/2024 120  55 - 135 U/L Final    AST 03/06/2024 23  10 - 40 U/L Final    ALT 03/06/2024 22  10 - 44 U/L Final    eGFR 03/06/2024 >60  >60 mL/min/1.73 m^2 Final    Anion Gap 03/06/2024 10  8 - 16 mmol/L Final    WBC 03/06/2024 6.37  3.90 - 12.70 K/uL Final    RBC 03/06/2024 3.53 (L)  4.00 - 5.40 M/uL Final    Hemoglobin 03/06/2024 10.2 (L)  12.0 - 16.0 g/dL Final    Hematocrit 03/06/2024 32.0 (L)  37.0 - 48.5 % Final    MCV 03/06/2024 91  82 - 98 fL Final    MCH 03/06/2024 28.9  27.0 - 31.0 pg Final    MCHC 03/06/2024 31.9 (L)  32.0 - 36.0 g/dL Final    RDW 03/06/2024 16.0 (H)  11.5 - 14.5 % Final    Platelets 03/06/2024 170  150 - 450 K/uL Final    MPV 03/06/2024 9.1 (L)  9.2 - 12.9 fL Final    Immature Granulocytes 03/06/2024 0.3  0.0 - 0.5 % Final    Gran # (ANC) 03/06/2024 4.0  1.8 - 7.7 K/uL Final    Immature Grans (Abs) 03/06/2024 0.02  0.00 - 0.04 K/uL Final    Comment: Mild elevation in immature granulocytes is non specific and   can be seen in a variety of conditions including stress response,   acute inflammation, trauma and pregnancy. Correlation with other   laboratory and clinical findings is essential.      Lymph # 03/06/2024 1.8   1.0 - 4.8 K/uL Final    Mono # 03/06/2024 0.4  0.3 - 1.0 K/uL Final    Eos # 03/06/2024 0.2  0.0 - 0.5 K/uL Final    Baso # 03/06/2024 0.03  0.00 - 0.20 K/uL Final    nRBC 03/06/2024 0  0 /100 WBC Final    Gran % 03/06/2024 62.3  38.0 - 73.0 % Final    Lymph % 03/06/2024 28.7  18.0 - 48.0 % Final    Mono % 03/06/2024 5.8  4.0 - 15.0 % Final    Eosinophil % 03/06/2024 2.4  0.0 - 8.0 % Final    Basophil % 03/06/2024 0.5  0.0 - 1.9 % Final    Differential Method 03/06/2024 Automated   Final        Imaging   CT Chest Abdomen Pelvis With IV Contrast (XPD) NO Oral Contrast 12/12/2023  Details    Reading Physician Reading Date Result Priority   May, Michele FLORES MD  [phone] 12/12/2023 STAT     Narrative & Impression  EXAMINATION:  CT CHEST ABDOMEN PELVIS WITH IV CONTRAST (XPD)     CLINICAL HISTORY:  Hypokalemia,follow up ovarian cancer on treatment; evaluate treatment response for subsequent treatment planning.     TECHNIQUE:  Axial CT imaging was performed through the chest, abdomen and pelvis with  100cc  of intravenous contrast. Multiplanar reformats were performed and interpreted.     COMPARISON:  08/21/2023, 09/29/2022 and 09/24/2020     FINDINGS:  Chest:     The heart, great vessels, and mediastinal structures are within normal limits. No thoracic adenopathy.  Left-sided MediPort in the SVC.  Aortic atherosclerosis.     The lungs are clear bilaterally. No pleural effusions.     Abdomen:     The liver, spleen, kidneys, adrenal glands are within normal limits.  Stable 10 mm uncinate process pancreatic cyst.  No biliary ductal dilatation.     The gallbladder is unremarkable.     No free fluid, free air, or inflammatory change.     The small bowel is within normal limits.  Colonic diverticulosis.  Supraumbilical abdominal wall hernia containing a nondistended segment of the transverse colon.  No evidence of strangulation or incarceration.  The appendix is normal.     Aortic atherosclerosis without evidence of  aneurysm.     Pelvis:     The urinary bladder is unremarkable. The uterus has been removed.  No free pelvic fluid or adenopathy.     No significant osseous abnormality is identified.  No suspicious osseous lesions.     Impression:     No CT evidence of recurrent or metastatic disease in the chest, abdomen or pelvis.     All CT scans at this facility are performed  using dose modulation techniques as appropriate to performed exam including the following:  automated exposure control; adjustment of mA and/or kV according to the patients size (this includes techniques or standardized protocols for targeted exams where dose is matched to indication/reason for exam: i.e. extremities or head);  iterative reconstruction technique.        NM PET CT FDG SKULL BASE TO MID THIGH - 02/26/2024  FINDINGS:  Head/neck: There is normal physiologic uptake noted within the visualized brain parenchyma. No FDG avid adenopathy within the neck.     Chest: No FDG avid pulmonary nodules/masses. No FDG avid mediastinal, hilar or axillary lymph nodes.A left-sided MediPort catheter is noted.     Abdomen/Pelvis: Normal physiologic uptake noted within the liver, spleen, urinary tract, and bowel.The adrenals are unremarkable.  There are a couple of left periaortic retroperitoneal lymph nodes that demonstrate an SUV max of up to 2.8 and 3.1.  Findings are nonspecific.  There is diverticulosis seen scattered throughout the colon.  The midportion of the transverse colon is contained within a midline ventral hernia.  No evidence for bowel obstruction..     Skeletal: No FDG avid osseus lesions identified.     Impression:  1. Low level nonspecific uptake associated with a couple of left periaortic retroperitoneal lymph nodes.  It is possible these lymph nodes may just be reactive in nature.  Continued follow-up recommended.    Assessment and Plan   Recurrent, Stage IV Serous Ovarian Cancer with Peritoneal Carcinomatosis - 01/2019   Treated with  neoadjuvant chemotherapy with carboplatin paclitaxel and Avastin (02/2019 - 07/2019), followed by surgical resection in August 2019.  Patient was then treated with Avastin maintenance  08/21/2023 CT CAP: Marked interval disease progression with severe peritoneal carcinomatosis as detailed above.  12/12/2023 CT CAP: No CT evidence of recurrent or metastatic disease in the chest, abdomen or pelvis.  : 7 >> 21 >> 32 >> 64 >> 11  Started Carbo/Taxol 09/29/2023  C1 no complications  C2 and C3 (11/10/23): reaction consisting of feeling presyncopal, SOB, sweating; vitals significant for hypoxia and hypotension; evaluated by AI and recs for desensitization  C4 -C6with desensitization  Per medical record, patient declined treatment until after the new year  CT CAP 12/12/23 showed CLYDE  PET-CT 02/26/24: Low level nonspecific uptake associated with a couple of left periaortic retroperitoneal lymph nodes.  It is possible these lymph nodes may just be reactive in nature  Will repeat a PET-CT in 6 weeks; if nodes remain we discussed obtaining a biopsy and potential further treatment  Continue follow up with Gyn Onc  No maintenance therapy recommended      Chemotherapy Induced Neuropathy  Intermittent neuropathy in feet  Continue to monitor      Left Groin Pain  Only experiences when bladder full  Will obtain UA and ultrasound of the groin; she may need early gyn exam        Chronic Medical Conditions  Osteoarthritis  HTN  Anxiety        Med Onc Chart Routing      Follow up with physician 6 weeks. review PET-CT   Follow up with [facility]    Infusion scheduling note    Injection scheduling note    Labs CBC, CMP, phosphorus and magnesium   Scheduling:  Preferred lab:  Lab interval:     Imaging PET scan   please schedule in 5 weeks   Pharmacy appointment    Other referrals       Additional referrals needed  Pulmonology                The patient was seen, interviewed and examined. Pertinent lab and radiologic studies were reviewed. Pt  instructed to call should they develop concerning signs/symptoms or have further questions.        Portions of the record may have been created with voice recognition software. Occasional wrong-word or sound-a-like substitutions may have occurred due to the inherent limitations of voice recognition software. Read the chart carefully and recognize, using context, where substitutions have occurred.      Torri Pugh MD    Hematology/Oncology

## 2024-03-25 ENCOUNTER — TELEPHONE (OUTPATIENT)
Dept: INTERNAL MEDICINE | Facility: CLINIC | Age: 69
End: 2024-03-25
Payer: MEDICARE

## 2024-03-25 DIAGNOSIS — M51.36 DEGENERATIVE DISC DISEASE, LUMBAR: Primary | ICD-10-CM

## 2024-03-25 NOTE — TELEPHONE ENCOUNTER
Patient called stating that she is experiencing lower back pain after mopping her house over the weekend. Has been taking Tylenol and it is helping some. Started on yesterday morning. Has experienced this before and went to PT through [facility]. Is requesting a new referral for [facility]

## 2024-03-26 ENCOUNTER — CLINICAL SUPPORT (OUTPATIENT)
Dept: REHABILITATION | Facility: HOSPITAL | Age: 69
End: 2024-03-26
Attending: INTERNAL MEDICINE
Payer: MEDICARE

## 2024-03-26 ENCOUNTER — OFFICE VISIT (OUTPATIENT)
Dept: ALLERGY | Facility: CLINIC | Age: 69
End: 2024-03-26
Payer: MEDICARE

## 2024-03-26 VITALS
TEMPERATURE: 98 F | HEART RATE: 76 BPM | DIASTOLIC BLOOD PRESSURE: 64 MMHG | WEIGHT: 254.44 LBS | SYSTOLIC BLOOD PRESSURE: 105 MMHG | OXYGEN SATURATION: 96 % | HEIGHT: 67 IN | BODY MASS INDEX: 39.93 KG/M2

## 2024-03-26 DIAGNOSIS — M51.36 DEGENERATIVE DISC DISEASE, LUMBAR: ICD-10-CM

## 2024-03-26 DIAGNOSIS — M53.86 DECREASED RANGE OF MOTION OF LUMBAR SPINE: ICD-10-CM

## 2024-03-26 DIAGNOSIS — T50.905D ADVERSE EFFECT OF DRUG, SUBSEQUENT ENCOUNTER: Primary | ICD-10-CM

## 2024-03-26 DIAGNOSIS — Z74.09 DECREASED FUNCTIONAL MOBILITY AND ENDURANCE: Primary | ICD-10-CM

## 2024-03-26 DIAGNOSIS — M62.81 PROXIMAL MUSCLE WEAKNESS: ICD-10-CM

## 2024-03-26 DIAGNOSIS — T80.90XD INFUSION REACTION, SUBSEQUENT ENCOUNTER: ICD-10-CM

## 2024-03-26 PROCEDURE — 1160F RVW MEDS BY RX/DR IN RCRD: CPT | Mod: CPTII,S$GLB,, | Performed by: STUDENT IN AN ORGANIZED HEALTH CARE EDUCATION/TRAINING PROGRAM

## 2024-03-26 PROCEDURE — 3074F SYST BP LT 130 MM HG: CPT | Mod: CPTII,S$GLB,, | Performed by: STUDENT IN AN ORGANIZED HEALTH CARE EDUCATION/TRAINING PROGRAM

## 2024-03-26 PROCEDURE — 1159F MED LIST DOCD IN RCRD: CPT | Mod: CPTII,S$GLB,, | Performed by: STUDENT IN AN ORGANIZED HEALTH CARE EDUCATION/TRAINING PROGRAM

## 2024-03-26 PROCEDURE — 3078F DIAST BP <80 MM HG: CPT | Mod: CPTII,S$GLB,, | Performed by: STUDENT IN AN ORGANIZED HEALTH CARE EDUCATION/TRAINING PROGRAM

## 2024-03-26 PROCEDURE — 3288F FALL RISK ASSESSMENT DOCD: CPT | Mod: CPTII,S$GLB,, | Performed by: STUDENT IN AN ORGANIZED HEALTH CARE EDUCATION/TRAINING PROGRAM

## 2024-03-26 PROCEDURE — 97140 MANUAL THERAPY 1/> REGIONS: CPT

## 2024-03-26 PROCEDURE — 99214 OFFICE O/P EST MOD 30 MIN: CPT | Mod: S$GLB,,, | Performed by: STUDENT IN AN ORGANIZED HEALTH CARE EDUCATION/TRAINING PROGRAM

## 2024-03-26 PROCEDURE — 97162 PT EVAL MOD COMPLEX 30 MIN: CPT

## 2024-03-26 PROCEDURE — 3008F BODY MASS INDEX DOCD: CPT | Mod: CPTII,S$GLB,, | Performed by: STUDENT IN AN ORGANIZED HEALTH CARE EDUCATION/TRAINING PROGRAM

## 2024-03-26 PROCEDURE — 1101F PT FALLS ASSESS-DOCD LE1/YR: CPT | Mod: CPTII,S$GLB,, | Performed by: STUDENT IN AN ORGANIZED HEALTH CARE EDUCATION/TRAINING PROGRAM

## 2024-03-26 PROCEDURE — 1126F AMNT PAIN NOTED NONE PRSNT: CPT | Mod: CPTII,S$GLB,, | Performed by: STUDENT IN AN ORGANIZED HEALTH CARE EDUCATION/TRAINING PROGRAM

## 2024-03-26 PROCEDURE — 99999 PR PBB SHADOW E&M-EST. PATIENT-LVL V: CPT | Mod: PBBFAC,,, | Performed by: STUDENT IN AN ORGANIZED HEALTH CARE EDUCATION/TRAINING PROGRAM

## 2024-03-26 PROCEDURE — 97112 NEUROMUSCULAR REEDUCATION: CPT

## 2024-03-26 NOTE — PLAN OF CARE
[facility]   Physical Therapy Initial Evaluation      Date: 3/26/2024   Name: Casie Gaines  Clinic Number: [id number]    Therapy Diagnosis:    Encounter Diagnoses   Name Primary?    Degenerative disc disease, lumbar     Decreased functional mobility and endurance Yes    Decreased range of motion of lumbar spine     Proximal muscle weakness       Physician: Rossana Ang MD     Physician Orders: PT Eval and Treat  Medical Diagnosis from Referral: Degenerative disc disease, lumbar [M51.36]   Evaluation Date: 3/26/2024  Authorization Period Expiration: 3/25/2025  Plan of Care Expiration: 5/26/2024  Progress Note Due: 4/26/2024  Visit # / Visits authorized: 1/1   FOTO: 1/3 (last performed on 3/26/2024)    Precautions: Standard and CHF    Time In: 1325  Time Out: 1420  Total Billable Time (timed & untimed codes): 50 minutes    Subjective     Date of onset: 3/24/2024    History of current condition - Casie reports shoulder recently flared up her low back pain after mopping 6 bedrooms in her various [employer] houses. States she felt fine while performing these taks; however, Sunday morning she bent over to to turn off the shower and notes significant increased pain in the low back and could not stand up straight. She remembered some of the exercises given to her the last time she was in PT at [facility] and was able to do these to help her stand up straight. She began taking 2 tylenol to help with symptoms but recently had to increase to 3 due to unrelenting pain. Started wearing a sweat band on belly to provide some support and help with symptoms. 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   Current Level of Function: significant limitation with all ADL, IADL, community mobility and functional activities due to increased pain. Unable to bend forward due to pain and has to significantly modify motion or ask for help.     Dominant Extremity:    [x] Right    [] Left    Pts goals: Pt reported goals are to decrease overall pain levels in order to return to prior functional level.     Medical History:   Past Medical History:   Diagnosis Date    Anemia     Anxiety     Arthritis     knees    Cancer     ovarian    CHF (congestive heart failure)     Hx antineoplastic chemotherapy     last 2019    Hyperlipemia     Hypertension     Neck pain     Ovarian cancer 2019    CHEMO    Peritoneal carcinomatosis 2019       Surgical History:   Casie Gaines  has a past surgical history that includes  section; Dilation and curettage of uterus; Hysterectomy; Tubal ligation; breast reduction (10/02/2018); Cystoscopy w/ ureteral stent placement (Right, 2019); Retrograde pyelography (Right, 2019); Insertion of venous access port (Left, 2019); Total Reduction Mammoplasty (); Robot-assisted laparoscopic salpingo-oophorectomy using da Laisha Xi (Bilateral, 08/15/2019); Omentectomy (N/A, 08/15/2019); Lysis of adhesions of ureter (N/A, 08/15/2019); Cystoscopy w/ ureteral stent removal (10/04/2019); Colonoscopy (N/A, 2021); and Cataract extraction (Bilateral).    Medications:   Casie has a current medication list which includes the following prescription(s): albuterol, alprazolam, amlodipine, atenolol, azelastine,  biotin, cetirizine, dexamethasone, diclofenac sodium, epinephrine, fluticasone propionate, furosemide, gabapentin, ginkgo biloba, magnesium oxide, multivitamin with minerals, olanzapine, olmesartan-hydrochlorothiazide, ondansetron, pantoprazole, potassium chloride sa, prochlorperazine, rosuvastatin, sertraline, and zinc gluconate.    Allergies:   Review of patient's allergies indicates:  No Known Allergies     Objective        RANGE OF MOTION:   Lumbar ROM Right  (spine) Left   Pain/Dysfunction with Movement Goal   Lumbar Flexion (60º) Mid-shin in sitting  --- Significant pain with bending and returning to upright, bradykinetic movement    Lumbar Extension (30º) Unable  --- Significant increased pain with attempt    Lumbar Side Bending (25º) ~8 inches from floor in sitting  ~8 inches from floor in sitting  Significant increased pain     Lumbar Rotation Not tested Not tested         Hip AROM/PROM Right Left Pain/Dysfunction with Movement Goal   Hip Flexion (120º) NT NT     Hip Extension (30º) NT NT     Hip Abduction (45º) NT NT     Hip IR (45º) NT NT     Hip ER (45º) NT NT            STRENGTH:   L/E MMT Right  (spine) Left Pain/Dysfunction with Movement Goal   Modified (90/90) Abdominal Strength  unable ---     Hip Flexion  3+/5 3+/5  4+/5 B   Hip Extension  NT NT  4+/5 B   Hip Abduction  3+/5 3+/5  4+/5 B   Knee Extension 4-/5 4-/5  5/5 B   Knee Flexion 4-/5 4-/5  5/5 B   Hip IR NT NT  4+/5 B   Hip ER NT NT  4+/5 B   Ankle DF NT NT  5/5 B   Ankle PF NT NT  5/5 B                      SPECIAL TESTS:    Right  (spine) Left  Goal   Lumbar Distraction  [x] Positive    [] Negative --- Negative B    Lumbar Compression Not Tested --- Negative B    SLUMP [] Positive    [x] Negative [] Positive    [x] Negative Negative B           SENSATION  [x] Intact to Light Touch   [] Impaired:      PALPATION: Muscles: Increased tone and tenderness to palpation of: bilateral , paraspinals, quadratus lumborum, glutes.      POSTURE:  Pt  presents with postural abnormalities which include:    [x] Forward Head   [] Increased Lumbar Lordosis   [x] Rounded Shoulder   [] Genu Recurvatum   [x] Increased Thoracic Kyphosis [] Genu Valgus   [] Trunk Deviated    [] Pes Planus   [] Scapular Winging    [] Other:         FUNCTIONAL MOVEMENT PATTERNS  Movement Analysis Notes   Bed Mobility  []Functional  [x]Dysfunctional:  [x]Painful  []Non-Painful       Transfers []Functional  [x]Dysfunctional:  [x]Painful  []Non-Painful       Sit to Stand []Functional  [x]Dysfunctional:  [x]Painful  []Non-Painful       Squat []Functional  []Dysfunctional:  []Painful  []Non-Painful    Not tested   Single Leg Squat  []Functional  []Dysfunctional:  []Painful  []Non-Painful    Not tested   Step Down  []Functional  []Dysfunctional:  []Painful  []Non-Painful    Not tested    []Functional  []Dysfunctional:  []Painful  []Non-Painful             Function:     Intake Outcome Measure for FOTO Lumbar Spine Survey    Therapist reviewed FOTO scores for Casie on 3/26/2024.   FOTO report - see Media section or FOTO account for episode details    Intake Score: 42%         Treatment     Total Treatment time (time-based codes) separate from Evaluation: (24) minutes     Casie received the treatments listed below:        MANUAL THERAPY TECHNIQUES were applied for (12) minutes, including:    Soft tissue mobilization:     Joint mobilizations:   Lumbar distraction (beginning and end of treatment)   Functional dry needling: DEFERRED      NEUROMUSCULAR RE-EDUCATION ACTIVITIES to improve Balance, Coordination, Kinesthetic, Sense, Proprioception, and Posture for (12) minutes.  The following were included:    Performed Today:     Abdominal bracinx   Posterior pelvic tilt: 20x   Ball squeezes 3x10   Glute squeezes 3x10   Education on proper supine to sit transition      Interventions DEFERRED today                  Next Session:  Hamstring stretch  Hip abduction isometrics or clamshells   Supine march  + abdominal brace        Patient Education and Home Exercises     Education provided: (time included with treatment) minutes  PURPOSE: Patient educated on the impairments noted above and the effects of physical therapy intervention to improve overall condition and QOL.   EXERCISE: Patient was educated on all the above exercise prior/during/after for proper posture, positioning, and execution for safe performance with home exercise program.   STRENGTH: Patient educated on the importance of improved core and extremity strength in order to improve alignment of the spine and extremities with static positions and dynamic movement.   GAIT & BALANCE: Patient educated on the importance of strong core and lower extremity musculature in order to improve both static and dynamic balance, improve gait mechanics, reduce fall risk and improve household and community mobility.     Written Home Exercises Provided: yes.  Exercises were reviewed and Casie was able to demonstrate them prior to the end of the session.  Casie demonstrated good  understanding of the education provided. See EMR under Patient Instructions for exercises provided during therapy sessions.    Assessment     Casie is a 68 y.o. female referred to outpatient Physical Therapy with a medical diagnosis of lumbar degenerative disc disease . Pt presents with impairments in the following categories: IMPAIRMENTS: ROM, strength, posture, core strength and stability, and functional movement patterns. Testing was limited today due to high pain levels. Pt responded very well to lumbar distraction and reports 0/10 pain in supine following intervention. Pt was educated on proper technique to move from supine to sit while protecting the lumbar spine; pt reports significant improvement in symptoms with this technique and notes ~2/10 pain in sitting and standing following session     Pt prognosis is Good  Pt will benefit from skilled outpatient Physical Therapy to address the  "deficits stated above and in the chart below, provide pt/family education, and to maximize pt's level of independence.     Plan of care discussed with patient: Yes  Pt's spiritual, cultural and educational needs considered and patient is agreeable to the plan of care and goals as stated below:     Anticipated Barriers for therapy: co-morbidities, sedentary lifestyle, and occupation    Medical Necessity is demonstrated by the following  History  Co-morbidities and personal factors that may impact the plan of care [] LOW: no personal factors / co-morbidities  [] MODERATE: 1-2 personal factors / co-morbidities  [x] HIGH: 3+ personal factors / co-morbidities    Moderate / High Support Documentation:   Past Medical History:   Diagnosis Date    Anemia     Anxiety     Arthritis     knees    Cancer     ovarian    CHF (congestive heart failure)     Hx antineoplastic chemotherapy     last 6/2019    Hyperlipemia     Hypertension     Neck pain     Ovarian cancer 2019    CHEMO    Peritoneal carcinomatosis 1/26/2019        Examination  Body Structures and Functions, activity limitations and participation restrictions that may impact the plan of care [] LOW: addressing 1-2 elements  [x] MODERATE: 3+ elements  [] HIGH: 4+ elements (please support below)    Moderate / High Support Documentation: See above in "Current Level of Function"      Clinical Presentation [] LOW: stable  [x] MODERATE: Evolving  [] HIGH: Unstable     Decision Making/ Complexity Score: moderate         Short Term Goals:  4 weeks Status  Date Met   PAIN: Pt will report worst pain of 6/10 in order to progress toward max functional ability and improve quality of life. [x] Progressing  [] Met  [] Not Met    FUNCTION: Patient will demonstrate improved function as indicated by a score of greater than or equal to 54 out of 100 on FOTO. [x] Progressing  [] Met  [] Not Met    MOBILITY: Patient will improve AROM to 50% of stated goals, listed in objective measures above, " in order to progress towards independence with functional activities.  [x] Progressing  [] Met  [] Not Met    STRENGTH: Patient will improve strength to 50% of stated goals, listed in objective measures above, in order to progress towards independence with functional activities. [x] Progressing  [] Met  [] Not Met    POSTURE: Patient will correct postural deviations in sitting and standing, to decrease pain and promote long term stability.  [x] Progressing  [] Met  [] Not Met    HEP: Patient will demonstrate independence with HEP in order to progress toward functional independence. [x] Progressing  [] Met  [] Not Met      Long Term Goals:  8 weeks Status Date Met   PAIN: Pt will report worst pain of 2/10 in order to progress toward max functional ability and improve quality of life [x] Progressing  [] Met  [] Not Met    FUNCTION: Patient will demonstrate improved function as indicated by a score of greater than or equal to 66 out of 100 on FOTO. [x] Progressing  [] Met  [] Not Met    MOBILITY: Patient will improve AROM to stated goals, listed in objective measures above, in order to return to maximal functional potential and improve quality of life.  [x] Progressing  [] Met  [] Not Met    STRENGTH: Patient will improve strength to stated goals, listed in objective measures above, in order to improve functional independence and quality of life.  [x] Progressing  [] Met  [] Not Met    Patient will return to normal ADL's, IADL's, community involvement, recreational activities, and work-related activities with less than or equal to 2/10 pain and maximal function.  [x] Progressing  [] Met  [] Not Met      Plan     Plan of care Certification: 3/26/2024 to 5/26/2024.    Outpatient Physical Therapy 2 times weekly for 8 weeks to include any combination of the following interventions: virtual visits, dry needling, modalities, electrical stimulation (IFC, Pre-Mod, Attended with Functional Dry Needling), Cervical/Lumbar  Traction, Gait Training, Manual Therapy, Neuromuscular Re-ed, Patient Education, Self Care, Therapeutic Exercise, and Therapeutic Activites     Yolanda Estrada, PT, DPT

## 2024-03-26 NOTE — PROGRESS NOTES
Allergy and Immunology  Established Patient Clinic Note    Date: 3/26/2024  Chief Complaint   Patient presents with    Follow-up     History  Casie Gaines is a 68 y.o. female being seen for follow-up today.    Stage 4 Ovarian Cancer   Adverse Drug Reaction   Chemotherapy/Infusion Reaction   - Discussed at length desensitization protocol and reasoning   - Answered questions - Protocol per determined per pharmacy  - Patient reported recently ringing the bell at the cancer center   - Entering surveillance at this time - does not have to continue high dose antihistamines at this time  - PRN appointments moving in the future      Prior HPI   - Patient with tx with paclitaxel and carboplatin in the past years prior to current regimen   - Opens potential for sensitization   - 09/2023: reinitiated without issues or reaction   - 10/2023: patient reported near completion of infusion she felt warm in head, tachypnea/hyperventilation, sweating. Patient was given benadryl with improvement. [place] noting no drop in BP, no urticaria/rash, no LOC, no n/v/d or other GI issues, no swelling.   - 11/2023: patient again with similar reaction as 10/2023. [place] noting again - no drop in BP, no urticaria/rash, no LOC, no n/v/d or other GI issues, no swelling.   - Hx is more consistent with infusion reaction (vasovagal as well) than IgE mediated allergy  - Risk v benefit - better to follow desensitization protocol to ensure patient receives treatments as recommended during ideal timeframe     Allergies, PMH, PSH, Social, and Family History were reviewed.    Current Outpatient Medications on File Prior to Visit   Medication Sig Dispense Refill    ALPRAZolam (XANAX) 0.25 MG tablet Take 1 tablet (0.25 mg total) by mouth 3 (three) times daily as needed for Anxiety. 60 tablet 2    amLODIPine (NORVASC) 5 MG tablet Take 2 tablets (10 mg total) by mouth once daily. 180 tablet 3    atenoloL (TENORMIN) 25 MG tablet Take 1 tablet (25 mg total)  by mouth once daily. 90 tablet 2    biotin 1 mg tablet Take 1,000 mcg by mouth once daily.       dexAMETHasone (DECADRON) 4 MG Tab Take 2 tablets (8 mg total) by mouth once daily. Take as directed on days 2, 3 and 4 of your chemotherapy cycle. 6 tablet 11    diclofenac sodium (VOLTAREN) 1 % Gel Apply once a day to back as needed 100 g 1    fluticasone propionate (FLONASE) 50 mcg/actuation nasal spray 1 spray (50 mcg total) by Each Nostril route every 12 (twelve) hours as needed for Rhinitis. 16 g 0    furosemide (LASIX) 40 MG tablet Take 1 tablet (40 mg total) by mouth once daily. 30 tablet 11    gabapentin (NEURONTIN) 300 MG capsule Take 1 capsule (300 mg total) by mouth 3 (three) times daily. 90 capsule 3    ginkgo biloba 40 mg Tab Take 40 mg by mouth.      magnesium oxide (MAG-OX) 400 mg (241.3 mg magnesium) tablet Take 1 tablet (400 mg total) by mouth once daily. 1 tablet 0    multivitamin with minerals tablet Take 1 tablet by mouth once daily.       OLANZapine (ZYPREXA) 5 MG tablet Take 1 tablet (5 mg total) by mouth every evening on days 1 through 4 of your chemotherapy regimen. 4 tablet 11    olmesartan-hydrochlorothiazide (BENICAR HCT) 40-25 mg per tablet Take 1 tablet by mouth once daily. 90 tablet 1    ondansetron (ZOFRAN-ODT) 8 MG TbDL Dissolve 1 tablet (8 mg total) under the tongue every 6 (six) hours as needed. 30 tablet 11    pantoprazole (PROTONIX) 40 MG tablet Take 1 tablet (40 mg total) by mouth once daily. 90 tablet 1    potassium chloride SA (K-DUR,KLOR-CON) 20 MEQ tablet Take 1 tablet (20 mEq total) by mouth 2 (two) times daily. 2 tablet 0    prochlorperazine (COMPAZINE) 5 MG tablet Take 2 tablets (10 mg total) by mouth every 6 (six) hours as needed (nausea or vomiting). 30 tablet 11    rosuvastatin (CRESTOR) 10 MG tablet Take 1 tablet (10 mg total) by mouth once daily. 90 tablet 1    sertraline (ZOLOFT) 25 MG tablet Take 1 tablet (25 mg total) by mouth once daily. 90 tablet 0    zinc gluconate  50 mg tablet Take 50 mg by mouth once daily.      albuterol 90 mcg/actuation inhaler Inhale 2 puffs into the lungs every 4 (four) hours as needed for Wheezing. Rescue 18 g 0    azelastine (ASTELIN) 137 mcg (0.1 %) nasal spray 1 spray (137 mcg total) by Nasal route 2 (two) times daily. 30 mL 0    cetirizine (ZYRTEC) 10 MG tablet Take 1 tablet (10 mg total) by mouth once daily. 30 tablet 5    EPINEPHrine (EPIPEN) 0.3 mg/0.3 mL AtIn Inject 0.3 mLs (0.3 mg total) into the muscle once. for 1 dose 2 each 0     No current facility-administered medications on file prior to visit.     Physical Examination  Vitals:    03/26/24 1155   BP: 105/64   Pulse: 76   Temp: 97.7 °F (36.5 °C)     GENERAL:  female in no apparent distress and well developed and well nourished  HEAD:  Normocephalic, without obvious abnormality, atraumatic  EYES: sclera anicteric, conjunctiva normochromic  EARS: normal TM's and external ear canals both ears  NOSE: without erythema or discharge, clear discharge, turbinates normal    OROPHARYNX: moist mucous membranes without erythema, exudates or petechiae  LYMPH NODES: normal, supple, no lymphadenopathy  LUNGS: clear to auscultation, no wheezes, rales or rhonchi, symmetric air entry.  HEART: normal rate, regular rhythm, normal S1, S2, no murmurs, rubs, clicks or gallops.  ABDOMEN: soft, nontender, nondistended, no masses or organomegaly.  MUSCULOSKELETAL: no gross joint deformity or swelling.  NEURO: alert, oriented, normal speech, no focal findings or movement disorder noted.  SKIN: normal coloration and turgor, no rashes, no suspicious skin lesions noted.     Assessment/Plan:   Problem List Items Addressed This Visit          Other    Drug reaction - Primary    Current Assessment & Plan     - PRN appointment with completion of latest chemo   - Would like to be on board in the future if patient needs chemo again   - Can hold high dose antihistamines at this time          Infusion reaction     Follow  up:  Follow up if symptoms worsen or fail to improve.    Jeremiah Etienne MD   [facility]  Allergy and Immunology

## 2024-03-26 NOTE — ASSESSMENT & PLAN NOTE
- PRN appointment with completion of latest chemo   - Would like to be on board in the future if patient needs chemo again   - Can hold high dose antihistamines at this time

## 2024-03-28 PROBLEM — M62.81 PROXIMAL MUSCLE WEAKNESS: Status: ACTIVE | Noted: 2024-03-28

## 2024-03-28 PROBLEM — M53.86 DECREASED RANGE OF MOTION OF LUMBAR SPINE: Status: ACTIVE | Noted: 2024-03-28

## 2024-03-28 PROBLEM — Z74.09 DECREASED FUNCTIONAL MOBILITY AND ENDURANCE: Status: ACTIVE | Noted: 2024-03-28

## 2024-04-03 ENCOUNTER — CLINICAL SUPPORT (OUTPATIENT)
Dept: REHABILITATION | Facility: HOSPITAL | Age: 69
End: 2024-04-03
Attending: INTERNAL MEDICINE
Payer: MEDICARE

## 2024-04-03 DIAGNOSIS — M62.81 PROXIMAL MUSCLE WEAKNESS: ICD-10-CM

## 2024-04-03 DIAGNOSIS — Z74.09 DECREASED FUNCTIONAL MOBILITY AND ENDURANCE: Primary | ICD-10-CM

## 2024-04-03 DIAGNOSIS — M53.86 DECREASED RANGE OF MOTION OF LUMBAR SPINE: ICD-10-CM

## 2024-04-03 PROCEDURE — 97112 NEUROMUSCULAR REEDUCATION: CPT | Mod: PN

## 2024-04-03 PROCEDURE — 97110 THERAPEUTIC EXERCISES: CPT | Mod: PN

## 2024-04-03 NOTE — PROGRESS NOTES
[facility]   Physical Therapy Treatment Note      Name: Casie Frias [facility]  Clinic Number: [id number]    Therapy Diagnosis:   Encounter Diagnoses   Name Primary?    Decreased functional mobility and endurance Yes    Decreased range of motion of lumbar spine     Proximal muscle weakness      Physician: Rossana Ang MD    Visit Date: 4/3/2024    Physician Orders: PT Eval and Treat  Medical Diagnosis from Referral: Degenerative disc disease, lumbar [M51.36]   Evaluation Date: 3/26/2024  Authorization Period Expiration: 3/25/2025  Plan of Care Expiration: 5/26/2024  Progress Note Due: 4/26/2024  Visit # / Visits authorized: 1/1   FOTO: 1/3 (last performed on 3/26/2024)     Precautions: Standard and CHF     Time In: 0933  Time Out: 1030  Total Billable Time (timed & untimed codes): 57 minutes    PTA Visit #: 0/5       Subjective     Patient reports: low back pain.  She was compliant with home exercise program.  Response to previous treatment: N/A  Functional change: N/A    Pain: 5/10  Location:  lumbar region, R>L     Objective      Objective Measures updated at progress report unless specified.     Treatment     Casie received the treatments listed below:        THERAPEUTIC EXERCISES to develop strength, ROM, and core stabilization for 32 minutes including :    Exercises   Details     NuStep   x  5 min for repetitive hip motion and postural cues     Seated ball roll outs x 1minute     Prone hip extension  x 2x10     Prone quad stretch  x 3x20 seconds     Clamshells  x 2x10 with red band                                    NEUROMUSCULAR RE-EDUCATION ACTIVITIES to improve Balance, Coordination, Kinesthetic, Proprioception, and Posture for 25 minutes.  The following were included:     Activity   Details     Matrix lumbar extension  x 60# x20     Matrix rotation x 26# x20     Posterior pelvic tilt with cues x 3x10 with 4 second hold     Supine DKTC with ball  x 2x15      Lower trunk rotation   x 2x15                THERAPEUTIC ACTIVITIES to improve dynamic and functional performance for 8 minutes including:    Activity   Details     Pushing sled                   MANUAL THERAPY TECHNIQUES including Joint mobilizations, Manual traction, and Soft tissue Mobilization were applied to the low back for 00 minutes:    Exercises   Details     Manual lumbar traction                    Supervised modalities after being cleared for contradictions: IFC Electrical Stimulation:  Casie received IFC Electrical Stimulation for pain control applied to the ---. Pt received stimulation for --- minutes. Casie tolderated treatment well without any adverse effects.          Patient Education and Home Exercises       Education provided:   - Home program    Written Home Exercises Provided: Patient instructed to cont prior HEP. Exercises were reviewed and Casie was able to demonstrate them prior to the end of the session.  Casie demonstrated good  understanding of the education provided. See Electronic Medical Record under Patient Instructions for exercises provided during therapy sessions    Assessment     The patient has low back pain and complains of weakness.  She was instructed in core and lower extremity strengthening.    Casie Is progressing well towards her goals.   Patient prognosis is Good.     Patient will continue to benefit from skilled outpatient physical therapy to address the deficits listed in the problem list box on initial evaluation, provide pt/family education and to maximize pt's level of independence in the home and community environment.     Patient's spiritual, cultural and educational needs considered and pt agreeable to plan of care and goals.     Anticipated barriers to physical therapy: None    Short Term Goals:  4 weeks Status  Date Met   PAIN: Pt will report worst pain of 6/10 in order to progress toward max functional ability and improve quality of life. [x] Progressing  [] Met  [] Not Met      FUNCTION: Patient will demonstrate improved function as indicated by a score of greater than or equal to 54 out of 100 on FOTO. [x] Progressing  [] Met  [] Not Met     MOBILITY: Patient will improve AROM to 50% of stated goals, listed in objective measures above, in order to progress towards independence with functional activities.  [x] Progressing  [] Met  [] Not Met     STRENGTH: Patient will improve strength to 50% of stated goals, listed in objective measures above, in order to progress towards independence with functional activities. [x] Progressing  [] Met  [] Not Met     POSTURE: Patient will correct postural deviations in sitting and standing, to decrease pain and promote long term stability.  [x] Progressing  [] Met  [] Not Met     HEP: Patient will demonstrate independence with HEP in order to progress toward functional independence. [x] Progressing  [] Met  [] Not Met        Long Term Goals:  8 weeks Status Date Met   PAIN: Pt will report worst pain of 2/10 in order to progress toward max functional ability and improve quality of life [x] Progressing  [] Met  [] Not Met     FUNCTION: Patient will demonstrate improved function as indicated by a score of greater than or equal to 66 out of 100 on FOTO. [x] Progressing  [] Met  [] Not Met     MOBILITY: Patient will improve AROM to stated goals, listed in objective measures above, in order to return to maximal functional potential and improve quality of life.  [x] Progressing  [] Met  [] Not Met     STRENGTH: Patient will improve strength to stated goals, listed in objective measures above, in order to improve functional independence and quality of life.  [x] Progressing  [] Met  [] Not Met     Patient will return to normal ADL's, IADL's, community involvement, recreational activities, and work-related activities with less than or equal to 2/10 pain and maximal function.  [x] Progressing  [] Met  [] Not Met        Plan      Plan of care Certification:  3/26/2024 to 5/26/2024.    Andres Martines, PT

## 2024-04-08 ENCOUNTER — INFUSION (OUTPATIENT)
Dept: INFUSION THERAPY | Facility: HOSPITAL | Age: 69
End: 2024-04-08
Attending: INTERNAL MEDICINE
Payer: MEDICARE

## 2024-04-08 ENCOUNTER — HOSPITAL ENCOUNTER (OUTPATIENT)
Dept: RADIOLOGY | Facility: HOSPITAL | Age: 69
Discharge: HOME | End: 2024-04-08
Attending: INTERNAL MEDICINE
Payer: MEDICARE

## 2024-04-08 DIAGNOSIS — R59.1 LYMPHADENOPATHY: ICD-10-CM

## 2024-04-08 DIAGNOSIS — C56.1 MALIGNANT NEOPLASM OF RIGHT OVARY: ICD-10-CM

## 2024-04-08 DIAGNOSIS — C56.3 OVARIAN CANCER, BILATERAL: Primary | ICD-10-CM

## 2024-04-08 DIAGNOSIS — C56.3 MALIGNANT NEOPLASM OF BOTH OVARIES: ICD-10-CM

## 2024-04-08 PROCEDURE — 63600175 PHARM REV CODE 636 W HCPCS: Performed by: INTERNAL MEDICINE

## 2024-04-08 PROCEDURE — 78815 PET IMAGE W/CT SKULL-THIGH: CPT | Mod: TC

## 2024-04-08 PROCEDURE — A9552 F18 FDG: HCPCS | Performed by: INTERNAL MEDICINE

## 2024-04-08 PROCEDURE — 96523 IRRIG DRUG DELIVERY DEVICE: CPT

## 2024-04-08 PROCEDURE — A4216 STERILE WATER/SALINE, 10 ML: HCPCS | Performed by: INTERNAL MEDICINE

## 2024-04-08 PROCEDURE — 78815 PET IMAGE W/CT SKULL-THIGH: CPT | Mod: 26,PS,, | Performed by: RADIOLOGY

## 2024-04-08 PROCEDURE — 25000003 PHARM REV CODE 250: Performed by: INTERNAL MEDICINE

## 2024-04-08 RX ORDER — HEPARIN 100 UNIT/ML
500 SYRINGE INTRAVENOUS
Status: CANCELLED | OUTPATIENT
Start: 2024-04-08

## 2024-04-08 RX ORDER — HEPARIN 100 UNIT/ML
500 SYRINGE INTRAVENOUS
Status: DISCONTINUED | OUTPATIENT
Start: 2024-04-08 | End: 2024-04-08 | Stop reason: HOSPADM

## 2024-04-08 RX ORDER — FLUDEOXYGLUCOSE F18 500 MCI/ML
10.25 INJECTION INTRAVENOUS
Status: COMPLETED | OUTPATIENT
Start: 2024-04-08 | End: 2024-04-08

## 2024-04-08 RX ORDER — SODIUM CHLORIDE 0.9 % (FLUSH) 0.9 %
10 SYRINGE (ML) INJECTION
Status: DISCONTINUED | OUTPATIENT
Start: 2024-04-08 | End: 2024-04-08 | Stop reason: HOSPADM

## 2024-04-08 RX ORDER — SODIUM CHLORIDE 0.9 % (FLUSH) 0.9 %
10 SYRINGE (ML) INJECTION
Status: CANCELLED | OUTPATIENT
Start: 2024-04-08

## 2024-04-08 RX ADMIN — Medication 10 ML: at 10:04

## 2024-04-08 RX ADMIN — HEPARIN 500 UNITS: 100 SYRINGE at 10:04

## 2024-04-08 RX ADMIN — FLUDEOXYGLUCOSE F-18 10.25 MILLICURIE: 500 INJECTION INTRAVENOUS at 09:04

## 2024-04-08 NOTE — NURSING
"Pt here for Mediport access for PET scan. Left chestwall mediport accessed with a 20g 1" rodriguez via sterile technique.  No blood return noted, positive flush.  Flushed with 10ml NS.  Pt will return after PET for rodriguez d/c.    "

## 2024-04-08 NOTE — DISCHARGE INSTRUCTIONS
..[place]  [id number] [facility]  [address]  [phone] phone     [phone] fax  Hours of Operation: Monday- Friday 8:00am- 5:00pm  After hours phone  [phone]  Hematology / Oncology Physicians on call    Dr. Varinder Green        Nurse Practitioners:    Lynn Rouse, ASHLEIGH Perrin, ASHLEIGH Pineda, ASHLEIGH Pal, ASHLEIGH Orellana, ASHLEIGH Vera, PA      Please don't hesitate to call if you have any concerns.    .FALL PREVENTION   Falls often occur due to slipping, tripping or losing your balance. Here are ways to reduce your risk of falling again.   Was there anything that caused your fall that can be fixed, removed or replaced?   Make your home safe by keeping walkways clear of objects you may trip over.   Use non-slip pads under rugs.   Do not walk in poorly lit areas.   Do not stand on chairs or wobbly ladders.   Use caution when reaching overhead or looking upward. This position can cause a loss of balance.   Be sure your shoes fit properly, have non-slip bottoms and are in good condition.   Be cautious when going up and down stairs, curbs, and when walking on uneven sidewalks.   If your balance is poor, consider using a cane or walker.   If your fall was related to alcohol use, stop or limit alcohol intake.   If your fall was related to use of sleeping medicines, talk to your doctor about this. You may need to reduce your dosage at bedtime if you awaken during the night to go to the bathroom.   To reduce the need for nighttime bathroom trips:   Avoid drinking fluids for several hours before going to bed   Empty your bladder before going to bed   Men can keep a urinal at the bedside   © 6211-5930 [employer], [address] [facility], [place] [address]. All rights reserved. This information is not intended as a substitute for professional medical care. Always follow your healthcare  professional's instructions.  .WAYS TO HELP PREVENT INFECTION        WASH YOUR HANDS OFTEN DURING THE DAY, ESPECIALLY BEFORE YOU EAT, AFTER USING THE BATHROOM, AND AFTER TOUCHING ANIMALS    STAY AWAY FROM PEOPLE WHO HAVE ILLNESSES YOU CAN CATCH; SUCH AS COLDS, FLU, CHICKEN POX    TRY TO AVOID CROWDS    STAY AWAY FROM CHILDREN WHO RECENTLY HAVE RECEIVED LIVE VIRUS VACCINES    MAINTAIN GOOD MOUTH CARE    DO NOT SQUEEZE OR SCRATCH PIMPLES    CLEAN CUTS & SCRAPES RIGHT AWAY AND DAILY UNTIL HEALED WITH WARM WATER, SOAP & AN ANTISEPTIC    AVOID CONTACT WITH LITTER BOXES, BIRD CAGES, & FISH TANKS    AVOID STANDING WATER, IE., BIRD BATHS, FLOWER POTS/VASES, OR HUMIDIFIERS    WEAR GLOVES WHEN GARDENING OR CLEANING UP AFTER OTHERS, ESPECIALLY BABIES & SMALL CHILDREN    DO NOT EAT RAW FISH, SEAFOOD, MEAT, OR EGGS

## 2024-04-13 DIAGNOSIS — D84.821 IMMUNODEFICIENCY DUE TO CHEMOTHERAPY: ICD-10-CM

## 2024-04-13 DIAGNOSIS — Z79.899 IMMUNODEFICIENCY DUE TO CHEMOTHERAPY: ICD-10-CM

## 2024-04-13 DIAGNOSIS — T45.1X5A IMMUNODEFICIENCY DUE TO CHEMOTHERAPY: ICD-10-CM

## 2024-04-13 RX ORDER — PANTOPRAZOLE SODIUM 40 MG/1
40 TABLET, DELAYED RELEASE ORAL DAILY
Qty: 90 TABLET | Refills: 1 | Status: ON HOLD | OUTPATIENT
Start: 2024-04-13 | End: 2024-05-20 | Stop reason: HOSPADM

## 2024-04-15 ENCOUNTER — OFFICE VISIT (OUTPATIENT)
Dept: PULMONOLOGY | Facility: CLINIC | Age: 69
End: 2024-04-15
Payer: MEDICARE

## 2024-04-15 ENCOUNTER — TELEPHONE (OUTPATIENT)
Dept: SLEEP MEDICINE | Facility: CLINIC | Age: 69
End: 2024-04-15
Payer: MEDICARE

## 2024-04-15 VITALS
OXYGEN SATURATION: 95 % | RESPIRATION RATE: 16 BRPM | HEART RATE: 81 BPM | HEIGHT: 67 IN | WEIGHT: 255.88 LBS | BODY MASS INDEX: 40.16 KG/M2 | DIASTOLIC BLOOD PRESSURE: 80 MMHG | SYSTOLIC BLOOD PRESSURE: 119 MMHG

## 2024-04-15 DIAGNOSIS — R06.09 DYSPNEA ON EXERTION: ICD-10-CM

## 2024-04-15 DIAGNOSIS — R06.02 SHORTNESS OF BREATH: ICD-10-CM

## 2024-04-15 DIAGNOSIS — G47.30 SLEEP-DISORDERED BREATHING: Primary | ICD-10-CM

## 2024-04-15 DIAGNOSIS — C56.3 OVARIAN CANCER, BILATERAL: ICD-10-CM

## 2024-04-15 DIAGNOSIS — Z85.43 HISTORY OF OVARIAN CANCER: ICD-10-CM

## 2024-04-15 PROCEDURE — 1159F MED LIST DOCD IN RCRD: CPT | Mod: CPTII,S$GLB,, | Performed by: HOSPITALIST

## 2024-04-15 PROCEDURE — 3288F FALL RISK ASSESSMENT DOCD: CPT | Mod: CPTII,S$GLB,, | Performed by: HOSPITALIST

## 2024-04-15 PROCEDURE — 1160F RVW MEDS BY RX/DR IN RCRD: CPT | Mod: CPTII,S$GLB,, | Performed by: HOSPITALIST

## 2024-04-15 PROCEDURE — 3008F BODY MASS INDEX DOCD: CPT | Mod: CPTII,S$GLB,, | Performed by: HOSPITALIST

## 2024-04-15 PROCEDURE — 3079F DIAST BP 80-89 MM HG: CPT | Mod: CPTII,S$GLB,, | Performed by: HOSPITALIST

## 2024-04-15 PROCEDURE — 1101F PT FALLS ASSESS-DOCD LE1/YR: CPT | Mod: CPTII,S$GLB,, | Performed by: HOSPITALIST

## 2024-04-15 PROCEDURE — 99203 OFFICE O/P NEW LOW 30 MIN: CPT | Mod: S$GLB,,, | Performed by: HOSPITALIST

## 2024-04-15 PROCEDURE — 99999 PR PBB SHADOW E&M-EST. PATIENT-LVL V: CPT | Mod: PBBFAC,,, | Performed by: HOSPITALIST

## 2024-04-15 PROCEDURE — 3074F SYST BP LT 130 MM HG: CPT | Mod: CPTII,S$GLB,, | Performed by: HOSPITALIST

## 2024-04-15 NOTE — PROGRESS NOTES
Subjective:      Patient ID: Casie Gaines is a 68 y.o. female.    Chief Complaint: Pulmonary Hypertension and Shortness of Breath    HPI 4/15/2024:    68 year old female with history of ovarian cancer (s/p chemo/surgery), HTN, obesity who is referred to Pulmonary clinic by Chrissie Allen MD for evaluation of shortness of breath. Mrs. Gaines reports that her shortness of breath is much better now, she feels like it was likely related to the chemo and is improving now that she has completed. She used to have SOB with walking across a sanders, now it is more associated with fatigue after a full day of activity.     Pertinent Work Up:  CTA 3/2024- Negative for PE, prominence of pulmonary artery  D-dimer 2/2024- 1.47  ECHO 2/2024- Normal EF and LV diastolic function, normal RV size and systolic function, normal TR, no ePASP noted  ECHO 2019- ePASP 52mmHg    JERALD Evaluation:     Time Awake:  Time Asleep:     Shift Work: Yes Remote, last 2003  Sleep Aids: No    Whick Sleepiness Scale TOTAL = 1  (validated sleepiness questionnaire with a higher score indicating greater sleepiness; range 0-24)    STOP-Bang Questionnaire   [x] Snoring    [x]  Tired/Fatigued/Sleepy  [] Obstruction (apneas/choking)  [x] Pressure (HTN)  [x] BMI >35  [x] Age >50  [x] Neck >40 cm  [] Gender male  STOP-Bang = 6 (low risk 0-2,high risk 3-8)    Sleep position:   Supine = rare   Non-supine = frequent    Mouth breathing during sleep:  possibly     BP Readings from Last 3 Encounters:   04/15/24 119/80   03/26/24 105/64   03/04/24 127/64         Review of Systems   Respiratory:  Positive for snoring and dyspnea on extertion. Negative for sputum production, shortness of breath and wheezing.      Objective:     Physical Exam   Constitutional: She is oriented to person, place, and time. She appears well-developed and well-nourished. She is obese.   Cardiovascular: Normal rate and regular rhythm.   Pulmonary/Chest: Normal expansion, effort normal  "and breath sounds normal.   Musculoskeletal:         General: No edema.   Neurological: She is alert and oriented to person, place, and time.   Skin: Skin is warm and dry.     Personal Diagnostic Review  As Above      4/15/2024     9:02 AM 3/26/2024    11:55 AM 3/4/2024    12:16 PM 3/4/2024    11:37 AM 3/4/2024    10:18 AM 3/4/2024     9:28 AM 2/28/2024     1:46 PM   Pulmonary Function Tests   SpO2 95 % 96 % 96 % 98 % 95 % 96 % 98 %   Height 5' 7" (1.702 m) 5' 7" (1.702 m)    5' 7" (1.702 m) 5' 7" (1.702 m)   Weight 116 kg (255 lb 13.5 oz) 115.4 kg (254 lb 6.6 oz)    115.5 kg (254 lb 11.9 oz) 118.3 kg (260 lb 12.9 oz)   BMI (Calculated) 40.1 39.8    39.9 40.8        Assessment:     1. Shortness of breath    2. History of ovarian cancer         Outpatient Encounter Medications as of 4/15/2024   Medication Sig Dispense Refill    albuterol 90 mcg/actuation inhaler Inhale 2 puffs into the lungs every 4 (four) hours as needed for Wheezing. Rescue 18 g 0    ALPRAZolam (XANAX) 0.25 MG tablet Take 1 tablet (0.25 mg total) by mouth 3 (three) times daily as needed for Anxiety. 60 tablet 2    amLODIPine (NORVASC) 5 MG tablet Take 2 tablets (10 mg total) by mouth once daily. 180 tablet 3    atenoloL (TENORMIN) 25 MG tablet Take 1 tablet (25 mg total) by mouth once daily. 90 tablet 2    azelastine (ASTELIN) 137 mcg (0.1 %) nasal spray 1 spray (137 mcg total) by Nasal route 2 (two) times daily. 30 mL 0    biotin 1 mg tablet Take 1,000 mcg by mouth once daily.       cetirizine (ZYRTEC) 10 MG tablet Take 1 tablet (10 mg total) by mouth once daily. 30 tablet 5    dexAMETHasone (DECADRON) 4 MG Tab Take 2 tablets (8 mg total) by mouth once daily. Take as directed on days 2, 3 and 4 of your chemotherapy cycle. 6 tablet 11    diclofenac sodium (VOLTAREN) 1 % Gel Apply once a day to back as needed 100 g 1    EPINEPHrine (EPIPEN) 0.3 mg/0.3 mL AtIn Inject 0.3 mLs (0.3 mg total) into the muscle once. for 1 dose 2 each 0    fluticasone " propionate (FLONASE) 50 mcg/actuation nasal spray 1 spray (50 mcg total) by Each Nostril route every 12 (twelve) hours as needed for Rhinitis. 16 g 0    furosemide (LASIX) 40 MG tablet Take 1 tablet (40 mg total) by mouth once daily. 30 tablet 11    gabapentin (NEURONTIN) 300 MG capsule Take 1 capsule (300 mg total) by mouth 3 (three) times daily. 90 capsule 3    ginkgo biloba 40 mg Tab Take 40 mg by mouth.      magnesium oxide (MAG-OX) 400 mg (241.3 mg magnesium) tablet Take 1 tablet (400 mg total) by mouth once daily. 1 tablet 0    multivitamin with minerals tablet Take 1 tablet by mouth once daily.       OLANZapine (ZYPREXA) 5 MG tablet Take 1 tablet (5 mg total) by mouth every evening on days 1 through 4 of your chemotherapy regimen. 4 tablet 11    olmesartan-hydrochlorothiazide (BENICAR HCT) 40-25 mg per tablet Take 1 tablet by mouth once daily. 90 tablet 1    ondansetron (ZOFRAN-ODT) 8 MG TbDL Dissolve 1 tablet (8 mg total) under the tongue every 6 (six) hours as needed. 30 tablet 11    pantoprazole (PROTONIX) 40 MG tablet Take 1 tablet (40 mg total) by mouth once daily. 90 tablet 1    potassium chloride SA (K-DUR,KLOR-CON) 20 MEQ tablet Take 1 tablet (20 mEq total) by mouth 2 (two) times daily. 2 tablet 0    prochlorperazine (COMPAZINE) 5 MG tablet Take 2 tablets (10 mg total) by mouth every 6 (six) hours as needed (nausea or vomiting). 30 tablet 11    rosuvastatin (CRESTOR) 10 MG tablet Take 1 tablet (10 mg total) by mouth once daily. 90 tablet 1    sertraline (ZOLOFT) 25 MG tablet Take 1 tablet (25 mg total) by mouth once daily. 90 tablet 0    zinc gluconate 50 mg tablet Take 50 mg by mouth once daily.      [DISCONTINUED] pantoprazole (PROTONIX) 40 MG tablet Take 1 tablet (40 mg total) by mouth once daily. 90 tablet 1     No facility-administered encounter medications on file as of 4/15/2024.     No orders of the defined types were placed in this encounter.      Plan:     Problem List Items Addressed This  Visit          Cardiac/Vascular    Dyspnea on exertion     - now nearly resolved, only evident with a long, full day              Oncology    Ovarian cancer, bilateral     - PET earlier this month negative for evidence of recurrence             Other    Sleep-disordered breathing - Primary     - discussed testing for JERALD with patient, she is agreeable through has her reservations on her ability to be compliant  - 2 night HST ordered         Relevant Orders    Home Sleep Study     Other Visit Diagnoses       Shortness of breath        History of ovarian cancer              Follow up in 3-4 weeks for HST review. Thank you Dr. Allen for referring Mrs. Gaines for evaluation.

## 2024-04-15 NOTE — ASSESSMENT & PLAN NOTE
- discussed testing for JERALD with patient, she is agreeable through has her reservations on her ability to be compliant  - 2 night HST ordered

## 2024-04-17 ENCOUNTER — OFFICE VISIT (OUTPATIENT)
Dept: HEMATOLOGY/ONCOLOGY | Facility: CLINIC | Age: 69
End: 2024-04-17
Payer: MEDICARE

## 2024-04-17 ENCOUNTER — LAB VISIT (OUTPATIENT)
Dept: LAB | Facility: HOSPITAL | Age: 69
End: 2024-04-17
Attending: INTERNAL MEDICINE
Payer: MEDICARE

## 2024-04-17 VITALS
SYSTOLIC BLOOD PRESSURE: 125 MMHG | DIASTOLIC BLOOD PRESSURE: 69 MMHG | HEIGHT: 67 IN | BODY MASS INDEX: 40.28 KG/M2 | TEMPERATURE: 97 F | RESPIRATION RATE: 20 BRPM | WEIGHT: 256.63 LBS | HEART RATE: 80 BPM | OXYGEN SATURATION: 98 %

## 2024-04-17 DIAGNOSIS — C56.3 OVARIAN CANCER, BILATERAL: Primary | ICD-10-CM

## 2024-04-17 DIAGNOSIS — C78.6 PERITONEAL CARCINOMATOSIS: ICD-10-CM

## 2024-04-17 DIAGNOSIS — C56.3 OVARIAN CANCER, BILATERAL: ICD-10-CM

## 2024-04-17 DIAGNOSIS — T45.1X5A CHEMOTHERAPY-INDUCED PERIPHERAL NEUROPATHY: ICD-10-CM

## 2024-04-17 DIAGNOSIS — C56.3 MALIGNANT NEOPLASM OF BOTH OVARIES: ICD-10-CM

## 2024-04-17 DIAGNOSIS — Z90.722 S/P BSO (BILATERAL SALPINGO-OOPHORECTOMY): ICD-10-CM

## 2024-04-17 DIAGNOSIS — G62.0 CHEMOTHERAPY-INDUCED PERIPHERAL NEUROPATHY: ICD-10-CM

## 2024-04-17 LAB
ALBUMIN SERPL BCP-MCNC: 3.7 G/DL (ref 3.5–5.2)
ALP SERPL-CCNC: 103 U/L (ref 55–135)
ALT SERPL W/O P-5'-P-CCNC: 22 U/L (ref 10–44)
ANION GAP SERPL CALC-SCNC: 10 MMOL/L (ref 8–16)
AST SERPL-CCNC: 21 U/L (ref 10–40)
BASOPHILS # BLD AUTO: 0.06 K/UL (ref 0–0.2)
BASOPHILS NFR BLD: 0.9 % (ref 0–1.9)
BILIRUB SERPL-MCNC: 0.5 MG/DL (ref 0.1–1)
BUN SERPL-MCNC: 11 MG/DL (ref 8–23)
CALCIUM SERPL-MCNC: 9.6 MG/DL (ref 8.7–10.5)
CHLORIDE SERPL-SCNC: 105 MMOL/L (ref 95–110)
CO2 SERPL-SCNC: 28 MMOL/L (ref 23–29)
CREAT SERPL-MCNC: 0.8 MG/DL (ref 0.5–1.4)
DIFFERENTIAL METHOD BLD: ABNORMAL
EOSINOPHIL # BLD AUTO: 0.1 K/UL (ref 0–0.5)
EOSINOPHIL NFR BLD: 1.4 % (ref 0–8)
ERYTHROCYTE [DISTWIDTH] IN BLOOD BY AUTOMATED COUNT: 16 % (ref 11.5–14.5)
EST. GFR  (NO RACE VARIABLE): >60 ML/MIN/1.73 M^2
GLUCOSE SERPL-MCNC: 125 MG/DL (ref 70–110)
HCT VFR BLD AUTO: 34.5 % (ref 37–48.5)
HGB BLD-MCNC: 10.9 G/DL (ref 12–16)
IMM GRANULOCYTES # BLD AUTO: 0.01 K/UL (ref 0–0.04)
IMM GRANULOCYTES NFR BLD AUTO: 0.2 % (ref 0–0.5)
LYMPHOCYTES # BLD AUTO: 1.9 K/UL (ref 1–4.8)
LYMPHOCYTES NFR BLD: 29 % (ref 18–48)
MAGNESIUM SERPL-MCNC: 1.4 MG/DL (ref 1.6–2.6)
MCH RBC QN AUTO: 29.1 PG (ref 27–31)
MCHC RBC AUTO-ENTMCNC: 31.6 G/DL (ref 32–36)
MCV RBC AUTO: 92 FL (ref 82–98)
MONOCYTES # BLD AUTO: 0.3 K/UL (ref 0.3–1)
MONOCYTES NFR BLD: 5.2 % (ref 4–15)
NEUTROPHILS # BLD AUTO: 4.1 K/UL (ref 1.8–7.7)
NEUTROPHILS NFR BLD: 63.3 % (ref 38–73)
NRBC BLD-RTO: 0 /100 WBC
PHOSPHATE SERPL-MCNC: 2.9 MG/DL (ref 2.7–4.5)
PLATELET # BLD AUTO: 233 K/UL (ref 150–450)
PMV BLD AUTO: 9.5 FL (ref 9.2–12.9)
POTASSIUM SERPL-SCNC: 3.7 MMOL/L (ref 3.5–5.1)
PROT SERPL-MCNC: 6.8 G/DL (ref 6–8.4)
RBC # BLD AUTO: 3.75 M/UL (ref 4–5.4)
SODIUM SERPL-SCNC: 143 MMOL/L (ref 136–145)
WBC # BLD AUTO: 6.39 K/UL (ref 3.9–12.7)

## 2024-04-17 PROCEDURE — 85025 COMPLETE CBC W/AUTO DIFF WBC: CPT | Performed by: INTERNAL MEDICINE

## 2024-04-17 PROCEDURE — 99213 OFFICE O/P EST LOW 20 MIN: CPT | Mod: S$GLB,,, | Performed by: INTERNAL MEDICINE

## 2024-04-17 PROCEDURE — 1126F AMNT PAIN NOTED NONE PRSNT: CPT | Mod: CPTII,S$GLB,, | Performed by: INTERNAL MEDICINE

## 2024-04-17 PROCEDURE — 1101F PT FALLS ASSESS-DOCD LE1/YR: CPT | Mod: CPTII,S$GLB,, | Performed by: INTERNAL MEDICINE

## 2024-04-17 PROCEDURE — 36415 COLL VENOUS BLD VENIPUNCTURE: CPT | Performed by: INTERNAL MEDICINE

## 2024-04-17 PROCEDURE — 3288F FALL RISK ASSESSMENT DOCD: CPT | Mod: CPTII,S$GLB,, | Performed by: INTERNAL MEDICINE

## 2024-04-17 PROCEDURE — 3078F DIAST BP <80 MM HG: CPT | Mod: CPTII,S$GLB,, | Performed by: INTERNAL MEDICINE

## 2024-04-17 PROCEDURE — 99999 PR PBB SHADOW E&M-EST. PATIENT-LVL IV: CPT | Mod: PBBFAC,,, | Performed by: INTERNAL MEDICINE

## 2024-04-17 PROCEDURE — 83735 ASSAY OF MAGNESIUM: CPT | Performed by: INTERNAL MEDICINE

## 2024-04-17 PROCEDURE — 84100 ASSAY OF PHOSPHORUS: CPT | Performed by: INTERNAL MEDICINE

## 2024-04-17 PROCEDURE — 3008F BODY MASS INDEX DOCD: CPT | Mod: CPTII,S$GLB,, | Performed by: INTERNAL MEDICINE

## 2024-04-17 PROCEDURE — 1159F MED LIST DOCD IN RCRD: CPT | Mod: CPTII,S$GLB,, | Performed by: INTERNAL MEDICINE

## 2024-04-17 PROCEDURE — 3074F SYST BP LT 130 MM HG: CPT | Mod: CPTII,S$GLB,, | Performed by: INTERNAL MEDICINE

## 2024-04-17 PROCEDURE — 80053 COMPREHEN METABOLIC PANEL: CPT | Performed by: INTERNAL MEDICINE

## 2024-04-17 NOTE — PROGRESS NOTES
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 female who presents to clinic for follow up.    She reports that she still has some neuropathy in her toes that extends to the mid foot and her feet are sore.  She is being worked for [facility]. We reviewed the recommendation for no maintenance treatment and she can start surveillance.  She expressed understanding.  She has no acute complaints. PET-CT reviewed and showed no evidence of disease.     Review of Systems   Constitutional:  Negative for activity change, appetite change, chills, diaphoresis, fatigue, fever and unexpected weight change.   HENT:  Negative for nosebleeds.    Respiratory:  Negative for shortness of breath.    Cardiovascular:  Negative for chest pain.   Gastrointestinal:  Negative for abdominal distention, abdominal pain, anal bleeding, blood in stool, constipation, diarrhea, nausea and vomiting.   Genitourinary:  Negative for difficulty urinating and hematuria.   Musculoskeletal:  Negative for arthralgias, back pain and myalgias.   Skin:  Negative for rash.   Neurological:  Negative for dizziness, weakness, light-headedness and headaches.   Hematological:  Does not bruise/bleed easily.   Psychiatric/Behavioral:  The patient is not nervous/anxious.      History     Oncology History   Peritoneal carcinomatosis   1/26/2019 Initial Diagnosis    Peritoneal carcinomatosis     2/15/2019 -  7/8/2019 Chemotherapy    Treatment Summary   Plan Name: OP GYN PACLITAXEL CARBOPLATIN (AUC 6) Q3W  Treatment Goal: Palliative  Status: Inactive  Start Date: 2/28/2019  End Date: 6/18/2019  Provider: Will Trevizo Jr., MD  Chemotherapy: bevacizumab (AVASTIN) 15 mg/kg = 1,600 mg in sodium chloride 0.9% 100 mL chemo infusion, 15 mg/kg = 1,600 mg (100 % of original dose 15 mg/kg), Intravenous, Clinic/HOD 1 time, 6 of 6 cycles  Dose modification: 15 mg/kg (original dose 15 mg/kg, Cycle 1)  Administration: 1,600 mg (2/28/2019), 1,600 mg (3/21/2019), 1,600 mg (4/11/2019), 1,600 mg (5/7/2019), 1,600 mg (5/28/2019), 1,510 mg (6/18/2019)  CARBOplatin (PARAPLATIN) 870 mg in sodium chloride 0.9% 337 mL chemo infusion, 870 mg (100 % of original dose 868.8 mg), Intravenous, Clinic/HOD 1 time, 6 of 6 cycles  Dose modification:   (original dose 868.8 mg, Cycle 1)  Administration: 870 mg (2/28/2019), 870 mg (3/21/2019), 900 mg (4/11/2019), 870 mg (5/7/2019), 660 mg (5/28/2019), 690 mg (6/18/2019)  PACLitaxel (TAXOL) 175 mg/m2 = 396 mg in sodium chloride 0.9% 566 mL chemo infusion, 175 mg/m2 = 396 mg, Intravenous, Clinic/HOD 1 time, 6 of 6 cycles  Dose modification: 140 mg/m2 (80 % of original dose 175 mg/m2, Cycle 5)  Administration: 396 mg (2/28/2019), 396 mg (3/21/2019), 396 mg (4/11/2019), 396 mg (5/7/2019), 318 mg (5/28/2019), 306 mg (6/18/2019)     10/9/2019 - 9/24/2020 Chemotherapy    Treatment Summary   Plan Name: OP BEVACIZUMAB Q3W   Treatment Goal: Maintenance  Status: Inactive  Start Date: 10/9/2019  End Date: 9/24/2020  Provider: Oscar Mckeon MD  Chemotherapy: dexAMETHasone tablet 8 mg, 8 mg (100 % of original dose 8 mg), Oral, Clinic/HOD 1 time, 2 of 8 cycles  Dose modification: 8 mg (original dose 8 mg, Cycle 8), 8 mg (original dose 8 mg, Cycle 12)  Administration: 8 mg (7/22/2020), 8 mg (8/13/2020)  bevacizumab (AVASTIN) 15 mg/kg = 1,615 mg in sodium chloride 0.9% 100 mL chemo infusion, 15 mg/kg = 1,615 mg,  Intravenous, Clinic/HOD 1 time, 11 of 17 cycles  Administration: 1,615 mg (10/9/2019), 1,615 mg (10/29/2019), 1,615 mg (12/10/2019), 1,615 mg (1/21/2020), 1,600 mg (4/2/2020), 1,615 mg (4/23/2020), 1,600 mg (7/1/2020), 1,600 mg (7/22/2020), 1,600 mg (8/13/2020), 1,795 mg (9/3/2020), 1,795 mg (9/24/2020)     9/29/2023 -  Chemotherapy    Treatment Summary   Plan Name: OP GYN PACLITAXEL CARBOPLATIN desensitization (AUC 5) Q3W  Treatment Goal: Control  Status: Active  Start Date: 9/29/2023  End Date: 10/2/2024 (Planned)  Provider: Ismael Juarez MD  Chemotherapy: CARBOplatin (PARAPLATIN) 650 mg in sodium chloride 0.9% 335 mL chemo infusion, 650 mg (100 % of original dose 648 mg), Intravenous, Clinic/HOD 1 time, 6 of 17 cycles  Dose modification:   (original dose 648 mg, Cycle 1),   (original dose 853.8 mg, Cycle 2),   (original dose 730 mg, Cycle 3),   (original dose 730 mg, Cycle 4), 5 mg (original dose 730 mg, Cycle 4), 7.3 mg (original dose 730 mg, Cycle 4), 5 mg (original dose 730 mg, Cycle 4, Reason: MD Discretion, Comment: desensitization), 73 mg (original dose 730 mg, Cycle 4), 720 mg (original dose 730 mg, Cycle 4, Reason: MD Discretion, Comment: desensitization), 648.97 mg (original dose 730 mg, Cycle 4, Reason: MD Discretion, Comment: desensitization), 0.61 mg (original dose 730 mg, Cycle 5, Reason: MD Discretion, Comment: desensitization), 5 mg (original dose 730 mg, Cycle 5, Reason: MD Discretion, Comment: desens), 60.5 mg (original dose 730 mg, Cycle 5, Reason: MD Discretion, Comment: desensitization),   (original dose 730 mg, Cycle 5, Reason: MD Discretion, Comment: new scr), 6.05 mg (original dose 730 mg, Cycle 5, Reason: Dose not tolerated, Comment: desensitization)  Administration: 650 mg (9/29/2023), 730 mg (11/10/2023), 710 mg (10/20/2023), 5 mg (1/3/2024), 5 mg (1/3/2024), 75 mg (1/3/2024), 650 mg (1/3/2024), 5 mg (2/14/2024), 5 mg (2/14/2024), 75 mg (2/14/2024), 650 mg (2/14/2024), 5 mg (1/24/2024),  60 mg (1/24/2024), 605 mg (1/24/2024), 5 mg (1/24/2024)  PACLitaxeL (TAXOL) 175 mg/m2 = 402 mg in sodium chloride 0.9% 500 mL chemo infusion, 175 mg/m2 = 402 mg, Intravenous, Clinic/HOD 1 time, 6 of 17 cycles  Administration: 402 mg (9/29/2023), 402 mg (10/20/2023), 408 mg (11/10/2023), 408 mg (1/3/2024), 408 mg (1/24/2024), 408 mg (2/14/2024)     Malignant neoplasm of right ovary (Resolved)   2/15/2019 Initial Diagnosis    Malignant neoplasm of right ovary     2/15/2019 - 7/8/2019 Chemotherapy    Treatment Summary   Plan Name: OP GYN PACLITAXEL CARBOPLATIN (AUC 6) Q3W  Treatment Goal: Palliative  Status: Inactive  Start Date: 2/28/2019  End Date: 6/18/2019  Provider: Will Trevizo Jr., MD  Chemotherapy: bevacizumab (AVASTIN) 15 mg/kg = 1,600 mg in sodium chloride 0.9% 100 mL chemo infusion, 15 mg/kg = 1,600 mg (100 % of original dose 15 mg/kg), Intravenous, Clinic/HOD 1 time, 6 of 6 cycles  Dose modification: 15 mg/kg (original dose 15 mg/kg, Cycle 1)  Administration: 1,600 mg (2/28/2019), 1,600 mg (3/21/2019), 1,600 mg (4/11/2019), 1,600 mg (5/7/2019), 1,600 mg (5/28/2019), 1,510 mg (6/18/2019)  CARBOplatin (PARAPLATIN) 870 mg in sodium chloride 0.9% 337 mL chemo infusion, 870 mg (100 % of original dose 868.8 mg), Intravenous, Clinic/HOD 1 time, 6 of 6 cycles  Dose modification:   (original dose 868.8 mg, Cycle 1)  Administration: 870 mg (2/28/2019), 870 mg (3/21/2019), 900 mg (4/11/2019), 870 mg (5/7/2019), 660 mg (5/28/2019), 690 mg (6/18/2019)  PACLitaxel (TAXOL) 175 mg/m2 = 396 mg in sodium chloride 0.9% 566 mL chemo infusion, 175 mg/m2 = 396 mg, Intravenous, Clinic/hospitals 1 time, 6 of 6 cycles  Dose modification: 140 mg/m2 (80 % of original dose 175 mg/m2, Cycle 5)  Administration: 396 mg (2/28/2019), 396 mg (3/21/2019), 396 mg (4/11/2019), 396 mg (5/7/2019), 318 mg (5/28/2019), 306 mg (6/18/2019)     10/9/2019 - 9/24/2020 Chemotherapy    Treatment Summary   Plan Name: OP BEVACIZUMAB Q3W   Treatment Goal:  Maintenance  Status: Inactive  Start Date: 10/9/2019  End Date: 9/24/2020  Provider: Oscar Mckeon MD  Chemotherapy: dexAMETHasone tablet 8 mg, 8 mg (100 % of original dose 8 mg), Oral, Clinic/HOD 1 time, 2 of 8 cycles  Dose modification: 8 mg (original dose 8 mg, Cycle 8), 8 mg (original dose 8 mg, Cycle 12)  Administration: 8 mg (7/22/2020), 8 mg (8/13/2020)  bevacizumab (AVASTIN) 15 mg/kg = 1,615 mg in sodium chloride 0.9% 100 mL chemo infusion, 15 mg/kg = 1,615 mg, Intravenous, Clinic/HOD 1 time, 11 of 17 cycles  Administration: 1,615 mg (10/9/2019), 1,615 mg (10/29/2019), 1,615 mg (12/10/2019), 1,615 mg (1/21/2020), 1,600 mg (4/2/2020), 1,615 mg (4/23/2020), 1,600 mg (7/1/2020), 1,600 mg (7/22/2020), 1,600 mg (8/13/2020), 1,795 mg (9/3/2020), 1,795 mg (9/24/2020)     Malignant neoplasm of both ovaries (Resolved)   2/18/2019 Initial Diagnosis    Ovarian cancer     10/9/2019 - 9/24/2020 Chemotherapy    Treatment Summary   Plan Name: OP BEVACIZUMAB Q3W   Treatment Goal: Maintenance  Status: Inactive  Start Date: 10/9/2019  End Date: 9/24/2020  Provider: Oscar Mckeon MD  Chemotherapy: dexAMETHasone tablet 8 mg, 8 mg (100 % of original dose 8 mg), Oral, Clinic/HOD 1 time, 2 of 8 cycles  Dose modification: 8 mg (original dose 8 mg, Cycle 8), 8 mg (original dose 8 mg, Cycle 12)  Administration: 8 mg (7/22/2020), 8 mg (8/13/2020)  bevacizumab (AVASTIN) 15 mg/kg = 1,615 mg in sodium chloride 0.9% 100 mL chemo infusion, 15 mg/kg = 1,615 mg, Intravenous, Clinic/HOD 1 time, 11 of 17 cycles  Administration: 1,615 mg (10/9/2019), 1,615 mg (10/29/2019), 1,615 mg (12/10/2019), 1,615 mg (1/21/2020), 1,600 mg (4/2/2020), 1,615 mg (4/23/2020), 1,600 mg (7/1/2020), 1,600 mg (7/22/2020), 1,600 mg (8/13/2020), 1,795 mg (9/3/2020), 1,795 mg (9/24/2020)     Ovarian cancer, bilateral   8/15/2019 Initial Diagnosis    Ovarian cancer, bilateral     10/9/2019 - 9/24/2020 Chemotherapy    Treatment Summary   Plan Name: OP BEVACIZUMAB Q3W    Treatment Goal: Maintenance  Status: Inactive  Start Date: 10/9/2019  End Date: 9/24/2020  Provider: Oscar Mckeon MD  Chemotherapy: dexAMETHasone tablet 8 mg, 8 mg (100 % of original dose 8 mg), Oral, Clinic/HOD 1 time, 2 of 8 cycles  Dose modification: 8 mg (original dose 8 mg, Cycle 8), 8 mg (original dose 8 mg, Cycle 12)  Administration: 8 mg (7/22/2020), 8 mg (8/13/2020)  bevacizumab (AVASTIN) 15 mg/kg = 1,615 mg in sodium chloride 0.9% 100 mL chemo infusion, 15 mg/kg = 1,615 mg, Intravenous, Clinic/HOD 1 time, 11 of 17 cycles  Administration: 1,615 mg (10/9/2019), 1,615 mg (10/29/2019), 1,615 mg (12/10/2019), 1,615 mg (1/21/2020), 1,600 mg (4/2/2020), 1,615 mg (4/23/2020), 1,600 mg (7/1/2020), 1,600 mg (7/22/2020), 1,600 mg (8/13/2020), 1,795 mg (9/3/2020), 1,795 mg (9/24/2020)     9/29/2023 -  Chemotherapy    Treatment Summary   Plan Name: OP GYN PACLITAXEL CARBOPLATIN desensitization (AUC 5) Q3W  Treatment Goal: Control  Status: Active  Start Date: 9/29/2023  End Date: 10/2/2024 (Planned)  Provider: Ismael Juarez MD  Chemotherapy: CARBOplatin (PARAPLATIN) 650 mg in sodium chloride 0.9% 335 mL chemo infusion, 650 mg (100 % of original dose 648 mg), Intravenous, Clinic/HOD 1 time, 6 of 17 cycles  Dose modification:   (original dose 648 mg, Cycle 1),   (original dose 853.8 mg, Cycle 2),   (original dose 730 mg, Cycle 3),   (original dose 730 mg, Cycle 4), 5 mg (original dose 730 mg, Cycle 4), 7.3 mg (original dose 730 mg, Cycle 4), 5 mg (original dose 730 mg, Cycle 4, Reason: MD Discretion, Comment: desensitization), 73 mg (original dose 730 mg, Cycle 4), 720 mg (original dose 730 mg, Cycle 4, Reason: MD Discretion, Comment: desensitization), 648.97 mg (original dose 730 mg, Cycle 4, Reason: MD Discretion, Comment: desensitization), 0.61 mg (original dose 730 mg, Cycle 5, Reason: MD Discretion, Comment: desensitization), 5 mg (original dose 730 mg, Cycle 5, Reason: MD Discretion, Comment: desens), 60.5 mg  (original dose 730 mg, Cycle 5, Reason: MD Discretion, Comment: desensitization),   (original dose 730 mg, Cycle 5, Reason: MD Discretion, Comment: new scr), 6.05 mg (original dose 730 mg, Cycle 5, Reason: Dose not tolerated, Comment: desensitization)  Administration: 650 mg (2023), 730 mg (11/10/2023), 710 mg (10/20/2023), 5 mg (1/3/2024), 5 mg (1/3/2024), 75 mg (1/3/2024), 650 mg (1/3/2024), 5 mg (2024), 5 mg (2024), 75 mg (2024), 650 mg (2024), 5 mg (2024), 60 mg (2024), 605 mg (2024), 5 mg (2024)  PACLitaxeL (TAXOL) 175 mg/m2 = 402 mg in sodium chloride 0.9% 500 mL chemo infusion, 175 mg/m2 = 402 mg, Intravenous, Clinic/HOD 1 time, 6 of 17 cycles  Administration: 402 mg (2023), 402 mg (10/20/2023), 408 mg (11/10/2023), 408 mg (1/3/2024), 408 mg (2024), 408 mg (2024)           Past Medical History:   Diagnosis Date    Anemia     Anxiety     Arthritis     knees    Cancer     ovarian    CHF (congestive heart failure)     Hx antineoplastic chemotherapy     last 2019    Hyperlipemia     Hypertension     Neck pain     Ovarian cancer 2019    CHEMO    Peritoneal carcinomatosis 2019       Past Surgical History:   Procedure Laterality Date    breast reduction  10/02/2018    CATARACT EXTRACTION Bilateral     2023     SECTION      X 1    COLONOSCOPY N/A 2021    Procedure: COLONOSCOPY;  Surgeon: Paulette Rojas MD;  Location: [facility] ENDO;  Service: Gastroenterology;  Laterality: N/A;    CYSTOSCOPY W/ URETERAL STENT PLACEMENT Right 2019    Procedure: CYSTOSCOPY, WITH URETERAL STENT INSERTION;  Surgeon: Timmy Santiago IV, MD;  Location: [facility] OR;  Service: Urology;  Laterality: Right;    CYSTOSCOPY W/ URETERAL STENT REMOVAL  10/04/2019    DILATION AND CURETTAGE OF UTERUS      HYSTERECTOMY      [facility] for fibroids (still has ovaries)    INSERTION OF VENOUS ACCESS PORT Left 2019    Procedure: INSERTION, VENOUS ACCESS PORT;   Surgeon: Ulisses Monzon MD;  Location: [facility] OR;  Service: General;  Laterality: Left;  Left internal jugular     LYSIS OF ADHESIONS OF URETER N/A 08/15/2019    Procedure: URETEROLYSIS;  Surgeon: Ismael Juarez MD;  Location: [facility] OR;  Service: OB/GYN;  Laterality: N/A;    OMENTECTOMY N/A 08/15/2019    Procedure: OMENTECTOMY;  Surgeon: Ismael Juarez MD;  Location: [facility] OR;  Service: OB/GYN;  Laterality: N/A;    RETROGRADE PYELOGRAPHY Right 2019    Procedure: PYELOGRAM, RETROGRADE;  Surgeon: Timmy Santiago IV, MD;  Location: [facility] OR;  Service: Urology;  Laterality: Right;    ROBOT-ASSISTED LAPAROSCOPIC SALPINGO-OOPHORECTOMY USING DA TIA XI Bilateral 08/15/2019    Procedure: XI ROBOTIC SALPINGO-OOPHORECTOMY;  Surgeon: Ismael Juarez MD;  Location: [facility];  Service: OB/GYN;  Laterality: Bilateral;    TOTAL REDUCTION MAMMOPLASTY  2018    TUBAL LIGATION         Family History   Problem Relation Name Age of Onset    Glaucoma Mother      No Known Problems Father      Colon cancer Brother      Breast cancer Maternal Aunt      Breast cancer Paternal Aunt      Ovarian cancer Paternal Aunt      Anesthesia problems Other cousin     Thrombophilia Neg Hx         Review of patient's allergies indicates:  No Known Allergies    Social History     Tobacco Use    Smoking status: Former     Current packs/day: 0.00     Average packs/day: 1 pack/day for 25.0 years (25.0 ttl pk-yrs)     Types: Cigarettes     Start date: 10/1/1993     Quit date: 10/1/2018     Years since quittin.5    Smokeless tobacco: Never    Tobacco comments:     States started quit 2 months ago after 30 years   Substance Use Topics    Alcohol use: Yes     Comment: occasionally  No alcohol 72h prior to sx    Drug use: No     Physical Exam     ECOG SCORE           Vitals:    24 0941   BP: 125/69   Pulse: 80   Resp: 20   Temp: 97.2 °F (36.2 °C)       Physical Exam          Labs   Labs:  Lab Visit on 2024   Component Date Value Ref Range Status     Phosphorus 04/17/2024 2.9  2.7 - 4.5 mg/dL Final    Magnesium 04/17/2024 1.4 (L)  1.6 - 2.6 mg/dL Final    Sodium 04/17/2024 143  136 - 145 mmol/L Final    Potassium 04/17/2024 3.7  3.5 - 5.1 mmol/L Final    Chloride 04/17/2024 105  95 - 110 mmol/L Final    CO2 04/17/2024 28  23 - 29 mmol/L Final    Glucose 04/17/2024 125 (H)  70 - 110 mg/dL Final    BUN 04/17/2024 11  8 - 23 mg/dL Final    Creatinine 04/17/2024 0.8  0.5 - 1.4 mg/dL Final    Calcium 04/17/2024 9.6  8.7 - 10.5 mg/dL Final    Total Protein 04/17/2024 6.8  6.0 - 8.4 g/dL Final    Albumin 04/17/2024 3.7  3.5 - 5.2 g/dL Final    Total Bilirubin 04/17/2024 0.5  0.1 - 1.0 mg/dL Final    Comment: For infants and newborns, interpretation of results should be based  on gestational age, weight and in agreement with clinical  observations.    Premature Infant recommended reference ranges:  Up to 24 hours.............<8.0 mg/dL  Up to 48 hours............<12.0 mg/dL  3-5 days..................<15.0 mg/dL  6-29 days.................<15.0 mg/dL      Alkaline Phosphatase 04/17/2024 103  55 - 135 U/L Final    AST 04/17/2024 21  10 - 40 U/L Final    ALT 04/17/2024 22  10 - 44 U/L Final    eGFR 04/17/2024 >60  >60 mL/min/1.73 m^2 Final    Anion Gap 04/17/2024 10  8 - 16 mmol/L Final    WBC 04/17/2024 6.39  3.90 - 12.70 K/uL Final    RBC 04/17/2024 3.75 (L)  4.00 - 5.40 M/uL Final    Hemoglobin 04/17/2024 10.9 (L)  12.0 - 16.0 g/dL Final    Hematocrit 04/17/2024 34.5 (L)  37.0 - 48.5 % Final    MCV 04/17/2024 92  82 - 98 fL Final    MCH 04/17/2024 29.1  27.0 - 31.0 pg Final    MCHC 04/17/2024 31.6 (L)  32.0 - 36.0 g/dL Final    RDW 04/17/2024 16.0 (H)  11.5 - 14.5 % Final    Platelets 04/17/2024 233  150 - 450 K/uL Final    MPV 04/17/2024 9.5  9.2 - 12.9 fL Final    Immature Granulocytes 04/17/2024 0.2  0.0 - 0.5 % Final    Gran # (ANC) 04/17/2024 4.1  1.8 - 7.7 K/uL Final    Immature Grans (Abs) 04/17/2024 0.01  0.00 - 0.04 K/uL Final    Comment: Mild elevation in  immature granulocytes is non specific and   can be seen in a variety of conditions including stress response,   acute inflammation, trauma and pregnancy. Correlation with other   laboratory and clinical findings is essential.      Lymph # 04/17/2024 1.9  1.0 - 4.8 K/uL Final    Mono # 04/17/2024 0.3  0.3 - 1.0 K/uL Final    Eos # 04/17/2024 0.1  0.0 - 0.5 K/uL Final    Baso # 04/17/2024 0.06  0.00 - 0.20 K/uL Final    nRBC 04/17/2024 0  0 /100 WBC Final    Gran % 04/17/2024 63.3  38.0 - 73.0 % Final    Lymph % 04/17/2024 29.0  18.0 - 48.0 % Final    Mono % 04/17/2024 5.2  4.0 - 15.0 % Final    Eosinophil % 04/17/2024 1.4  0.0 - 8.0 % Final    Basophil % 04/17/2024 0.9  0.0 - 1.9 % Final    Differential Method 04/17/2024 Automated   Final        Imaging   CT Chest Abdomen Pelvis With IV Contrast (XPD) NO Oral Contrast 12/12/2023  Details    Reading Physician Reading Date Result Priority   May, Michele FLORES MD  [phone] 12/12/2023 STAT     Narrative & Impression  EXAMINATION:  CT CHEST ABDOMEN PELVIS WITH IV CONTRAST (XPD)     CLINICAL HISTORY:  Hypokalemia,follow up ovarian cancer on treatment; evaluate treatment response for subsequent treatment planning.     TECHNIQUE:  Axial CT imaging was performed through the chest, abdomen and pelvis with  100cc  of intravenous contrast. Multiplanar reformats were performed and interpreted.     COMPARISON:  08/21/2023, 09/29/2022 and 09/24/2020     FINDINGS:  Chest:     The heart, great vessels, and mediastinal structures are within normal limits. No thoracic adenopathy.  Left-sided MediPort in the SVC.  Aortic atherosclerosis.     The lungs are clear bilaterally. No pleural effusions.     Abdomen:     The liver, spleen, kidneys, adrenal glands are within normal limits.  Stable 10 mm uncinate process pancreatic cyst.  No biliary ductal dilatation.     The gallbladder is unremarkable.     No free fluid, free air, or inflammatory change.     The small bowel is within normal  limits.  Colonic diverticulosis.  Supraumbilical abdominal wall hernia containing a nondistended segment of the transverse colon.  No evidence of strangulation or incarceration.  The appendix is normal.     Aortic atherosclerosis without evidence of aneurysm.     Pelvis:     The urinary bladder is unremarkable. The uterus has been removed.  No free pelvic fluid or adenopathy.     No significant osseous abnormality is identified.  No suspicious osseous lesions.     Impression:     No CT evidence of recurrent or metastatic disease in the chest, abdomen or pelvis.     All CT scans at this facility are performed  using dose modulation techniques as appropriate to performed exam including the following:  automated exposure control; adjustment of mA and/or kV according to the patients size (this includes techniques or standardized protocols for targeted exams where dose is matched to indication/reason for exam: i.e. extremities or head);  iterative reconstruction technique.        NM PET CT FDG SKULL BASE TO MID THIGH - 02/26/2024  FINDINGS:  Head/neck: There is normal physiologic uptake noted within the visualized brain parenchyma. No FDG avid adenopathy within the neck.     Chest: No FDG avid pulmonary nodules/masses. No FDG avid mediastinal, hilar or axillary lymph nodes.A left-sided MediPort catheter is noted.     Abdomen/Pelvis: Normal physiologic uptake noted within the liver, spleen, urinary tract, and bowel.The adrenals are unremarkable.  There are a couple of left periaortic retroperitoneal lymph nodes that demonstrate an SUV max of up to 2.8 and 3.1.  Findings are nonspecific.  There is diverticulosis seen scattered throughout the colon.  The midportion of the transverse colon is contained within a midline ventral hernia.  No evidence for bowel obstruction..     Skeletal: No FDG avid osseus lesions identified.     Impression:  1. Low level nonspecific uptake associated with a couple of left periaortic  retroperitoneal lymph nodes.  It is possible these lymph nodes may just be reactive in nature.  Continued follow-up recommended.      NM PET CT FDG SKULL BASE TO MID THIGH - 04/08/2024  FINDINGS:  Quality of the study: Adequate.     SUV max of the liver parenchyma is 3.7     Neck: No abnormal FDG avidity.  No lymphadenopathy or mass.     Chest: No FDG avid mediastinal, hilar, or axillary lymphadenopathy.  No pleural effusion.  No pulmonary nodule.     Abdomen/pelvis: Interval resolution of the low level FDG avidity of the left para-aortic lymph nodes.  Lymph nodes are also smaller compared to the prior exam and normal in size on this exam.  No abnormal FDG avidity.  No ascites.  No lymphadenopathy     Skeletal structures: No abnormal FDG avidity.  No focal lytic or sclerotic lesion in the osseous structures.     Physiologic uptake of the tracer is present within the brain, salivary glands, myocardium, GI and  tracts.     Incidental CT findings: No interval change in the umbilical hernia containing a loop of colon.  Left IJ MediPort with its tip in the SVC.    Impression:  No FDG PET/CT findings to suggest recurrent or metastatic disease.  Interval resolution of the low level FDG avidity seen in the left para-aortic retroperitoneal lymph nodes on the prior exam.     Assessment and Plan   Recurrent, Stage IV Serous Ovarian Cancer with Peritoneal Carcinomatosis - 01/2019   Treated with neoadjuvant chemotherapy with carboplatin paclitaxel and Avastin (02/2019 - 07/2019), followed by surgical resection in August 2019.  Patient was then treated with Avastin maintenance  08/21/2023 CT CAP: Marked interval disease progression with severe peritoneal carcinomatosis as detailed above.  12/12/2023 CT CAP: No CT evidence of recurrent or metastatic disease in the chest, abdomen or pelvis.  : 7 >> 21 >> 32 >> 64 >> 11  Started Carbo/Taxol 09/29/2023  C1 no complications  C2 and C3 (11/10/23): reaction consisting of feeling  presyncopal, SOB, sweating; vitals significant for hypoxia and hypotension; evaluated by AI and recs for desensitization  C4 -C6with desensitization  Per medical record, patient declined treatment until after the new year  CT CAP 12/12/23 showed CLYDE  PET-CT 02/26/24: Low level nonspecific uptake associated with a couple of left periaortic retroperitoneal lymph nodes.  It is possible these lymph nodes may just be reactive in nature  PET-CT 04/08/2024:  No FDG PET/CT findings to suggest recurrent or metastatic disease. Interval resolution of the low level FDG avidity seen in the left para-aortic retroperitoneal lymph nodes on the prior exam.   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 virtual to re-assess neuropathy   Follow up with [facility]    Infusion scheduling note    Injection scheduling note    Labs    Imaging    Pharmacy appointment    Other referrals                The patient was seen, interviewed and examined. Pertinent lab and radiologic studies were reviewed. Pt instructed to call should they develop concerning signs/symptoms or have further questions.        Portions of the record may have been created with voice recognition software. Occasional wrong-word or sound-a-likez substitutions may have occurred due to the inherent limitations of voice recognition software. Read the chart carefully and recognize, using context, where substitutions have occurred.      Torri Pugh MD    Hematology/Oncology

## 2024-04-23 ENCOUNTER — CLINICAL SUPPORT (OUTPATIENT)
Dept: REHABILITATION | Facility: HOSPITAL | Age: 69
End: 2024-04-23
Payer: MEDICARE

## 2024-04-23 DIAGNOSIS — M62.81 PROXIMAL MUSCLE WEAKNESS: ICD-10-CM

## 2024-04-23 DIAGNOSIS — M53.86 DECREASED RANGE OF MOTION OF LUMBAR SPINE: ICD-10-CM

## 2024-04-23 DIAGNOSIS — Z74.09 DECREASED FUNCTIONAL MOBILITY AND ENDURANCE: Primary | ICD-10-CM

## 2024-04-23 PROCEDURE — 97112 NEUROMUSCULAR REEDUCATION: CPT | Mod: PN

## 2024-04-23 PROCEDURE — 97110 THERAPEUTIC EXERCISES: CPT | Mod: PN

## 2024-04-23 NOTE — PROGRESS NOTES
[facility]   Physical Therapy Treatment Note      Name: Casie Frias [facility]  Clinic Number: [id number]    Therapy Diagnosis:   No diagnosis found.    Physician: Rossana Ang MD    Visit Date: 4/23/2024    Physician Orders: PT Eval and Treat  Medical Diagnosis from Referral: Degenerative disc disease, lumbar [M51.36]   Evaluation Date: 3/26/2024  Authorization Period Expiration: 3/25/2025  Plan of Care Expiration: 5/26/2024  Progress Note Due: 4/26/2024  Visit # / Visits authorized: 1/1   FOTO: 1/3 (last performed on 3/26/2024)     Precautions: Standard and CHF     Time In: 1030  Time Out: 1130  Total Billable Time (timed & untimed codes): 60 minutes    PTA Visit #: 0/5       Subjective     Patient reports: low back pain.  She was compliant with home exercise program.  Response to previous treatment: N/A  Functional change: N/A    Pain: 5/10  Location:  lumbar region, R>L     Objective      Objective Measures updated at progress report unless specified.     Treatment     Casie received the treatments listed below:        THERAPEUTIC EXERCISES to develop strength, ROM, and core stabilization for 30 minutes including :    Exercises   Details     NuStep   x  5 min for repetitive hip motion and postural cues     Seated ball roll outs x 1minute     Prone hip extension  x 2x10     Prone quad stretch  x 3x20 seconds     Clamshells  x 2x10 with red band                                    NEUROMUSCULAR RE-EDUCATION ACTIVITIES to improve Balance, Coordination, Kinesthetic, Proprioception, and Posture for 25 minutes.  The following were included:     Activity   Details     Matrix lumbar extension  x 60# x20     Matrix rotation x 26# x20     Posterior pelvic tilt with cues x 3x10 with 4 second hold     Supine DKTC with ball  x 2x15      Lower trunk rotation  x 2x15                THERAPEUTIC ACTIVITIES to improve dynamic and functional performance for --- minutes including:    Activity   Details      Pushing sled                   MANUAL THERAPY TECHNIQUES including Joint mobilizations, Manual traction, and Soft tissue Mobilization were applied to the low back for 00 minutes:    Exercises   Details     Manual lumbar traction                    Supervised modalities after being cleared for contradictions: IFC Electrical Stimulation:  Casie received IFC Electrical Stimulation for pain control applied to the ---. Pt received stimulation for --- minutes. Casie tolderated treatment well without any adverse effects.          Patient Education and Home Exercises       Education provided:   - Home program    Written Home Exercises Provided: Patient instructed to cont prior HEP. Exercises were reviewed and Casie was able to demonstrate them prior to the end of the session.  Casie demonstrated good  understanding of the education provided. See Electronic Medical Record under Patient Instructions for exercises provided during therapy sessions    Assessment     The patient has low back pain and complains of weakness.  She was instructed in core and lower extremity strengthening.    Casie Is progressing well towards her goals.   Patient prognosis is Good.     Patient will continue to benefit from skilled outpatient physical therapy to address the deficits listed in the problem list box on initial evaluation, provide pt/family education and to maximize pt's level of independence in the home and community environment.     Patient's spiritual, cultural and educational needs considered and pt agreeable to plan of care and goals.     Anticipated barriers to physical therapy: None    Short Term Goals:  4 weeks Status  Date Met   PAIN: Pt will report worst pain of 6/10 in order to progress toward max functional ability and improve quality of life. [x] Progressing  [] Met  [] Not Met     FUNCTION: Patient will demonstrate improved function as indicated by a score of greater than or equal to 54 out of 100 on FOTO. [x]  Progressing  [] Met  [] Not Met     MOBILITY: Patient will improve AROM to 50% of stated goals, listed in objective measures above, in order to progress towards independence with functional activities.  [x] Progressing  [] Met  [] Not Met     STRENGTH: Patient will improve strength to 50% of stated goals, listed in objective measures above, in order to progress towards independence with functional activities. [x] Progressing  [] Met  [] Not Met     POSTURE: Patient will correct postural deviations in sitting and standing, to decrease pain and promote long term stability.  [x] Progressing  [] Met  [] Not Met     HEP: Patient will demonstrate independence with HEP in order to progress toward functional independence. [x] Progressing  [] Met  [] Not Met        Long Term Goals:  8 weeks Status Date Met   PAIN: Pt will report worst pain of 2/10 in order to progress toward max functional ability and improve quality of life [x] Progressing  [] Met  [] Not Met     FUNCTION: Patient will demonstrate improved function as indicated by a score of greater than or equal to 66 out of 100 on FOTO. [x] Progressing  [] Met  [] Not Met     MOBILITY: Patient will improve AROM to stated goals, listed in objective measures above, in order to return to maximal functional potential and improve quality of life.  [x] Progressing  [] Met  [] Not Met     STRENGTH: Patient will improve strength to stated goals, listed in objective measures above, in order to improve functional independence and quality of life.  [x] Progressing  [] Met  [] Not Met     Patient will return to normal ADL's, IADL's, community involvement, recreational activities, and work-related activities with less than or equal to 2/10 pain and maximal function.  [x] Progressing  [] Met  [] Not Met        Plan      Plan of care Certification: 3/26/2024 to 5/26/2024.    Andres Martines, PT

## 2024-04-30 ENCOUNTER — CLINICAL SUPPORT (OUTPATIENT)
Dept: REHABILITATION | Facility: HOSPITAL | Age: 69
End: 2024-04-30
Payer: MEDICARE

## 2024-04-30 DIAGNOSIS — Z74.09 DECREASED FUNCTIONAL MOBILITY AND ENDURANCE: Primary | ICD-10-CM

## 2024-04-30 DIAGNOSIS — M53.86 DECREASED RANGE OF MOTION OF LUMBAR SPINE: ICD-10-CM

## 2024-04-30 DIAGNOSIS — M62.81 PROXIMAL MUSCLE WEAKNESS: ICD-10-CM

## 2024-04-30 PROCEDURE — 97112 NEUROMUSCULAR REEDUCATION: CPT | Mod: PN

## 2024-04-30 PROCEDURE — 97110 THERAPEUTIC EXERCISES: CPT | Mod: PN

## 2024-04-30 NOTE — PROGRESS NOTES
[facility]   Physical Therapy Treatment Note      Name: Casie Frias [facility]  Clinic Number: [id number]    Therapy Diagnosis:   No diagnosis found.    Physician: Rossana Ang MD    Visit Date: 4/30/2024    Physician Orders: PT Eval and Treat  Medical Diagnosis from Referral: Degenerative disc disease, lumbar [M51.36]   Evaluation Date: 3/26/2024  Authorization Period Expiration: 3/25/2025  Plan of Care Expiration: 5/26/2024  Progress Note Due: 4/26/2024  Visit # / Visits authorized: 1/1   FOTO: 1/3 (last performed on 3/26/2024)     Precautions: Standard and CHF     Time In: 0933  Time Out: 1026  Total Billable Time (timed & untimed codes): 53 minutes    PTA Visit #: 0/5       Subjective     Patient reports: low back pain.  She was compliant with home exercise program.  Response to previous treatment: N/A  Functional change: N/A    Pain: 5/10  Location:  lumbar region, R>L     Objective      Objective Measures updated at progress report unless specified.     Treatment     Casie received the treatments listed below:        THERAPEUTIC EXERCISES to develop strength, ROM, and core stabilization for 25 minutes including :    Exercises   Details     NuStep   x  5 min for repetitive hip motion and postural cues     Seated ball roll outs x 1minute     Prone hip extension  x 2x10     Prone quad stretch  x 3x20 seconds     Clamshells  x 2x10 with red band                                    NEUROMUSCULAR RE-EDUCATION ACTIVITIES to improve Balance, Coordination, Kinesthetic, Proprioception, and Posture for 30 minutes.  The following were included:     Activity   Details     Matrix lumbar extension  x 60# x20     Matrix rotation x 26# x20     Posterior pelvic tilt with cues x 3x10 with 4 second hold     Supine DKTC with ball  x 2x15      Lower trunk rotation  x 2x15      Shuttle  x 5 bands    Sit to stand  x 2x 10            THERAPEUTIC ACTIVITIES to improve dynamic and functional performance for  --- minutes including:    Activity   Details     Pushing sled                   MANUAL THERAPY TECHNIQUES including Joint mobilizations, Manual traction, and Soft tissue Mobilization were applied to the low back for 00 minutes:    Exercises   Details     Manual lumbar traction                    Supervised modalities after being cleared for contradictions: IFC Electrical Stimulation:  Casie received IFC Electrical Stimulation for pain control applied to the ---. Pt received stimulation for --- minutes. Casie tolderated treatment well without any adverse effects.          Patient Education and Home Exercises       Education provided:   - Home program    Written Home Exercises Provided: Patient instructed to cont prior HEP. Exercises were reviewed and Casie was able to demonstrate them prior to the end of the session.  Casie demonstrated good  understanding of the education provided. See Electronic Medical Record under Patient Instructions for exercises provided during therapy sessions    Assessment     The patient has low back pain and complains of weakness.  She was instructed in core and lower extremity strengthening.    Casie Is progressing well towards her goals.   Patient prognosis is Good.     Patient will continue to benefit from skilled outpatient physical therapy to address the deficits listed in the problem list box on initial evaluation, provide pt/family education and to maximize pt's level of independence in the home and community environment.     Patient's spiritual, cultural and educational needs considered and pt agreeable to plan of care and goals.     Anticipated barriers to physical therapy: None    Short Term Goals:  4 weeks Status  Date Met   PAIN: Pt will report worst pain of 6/10 in order to progress toward max functional ability and improve quality of life. [x] Progressing  [] Met  [] Not Met     FUNCTION: Patient will demonstrate improved function as indicated by a score of greater than or  equal to 54 out of 100 on FOTO. [x] Progressing  [] Met  [] Not Met     MOBILITY: Patient will improve AROM to 50% of stated goals, listed in objective measures above, in order to progress towards independence with functional activities.  [x] Progressing  [] Met  [] Not Met     STRENGTH: Patient will improve strength to 50% of stated goals, listed in objective measures above, in order to progress towards independence with functional activities. [x] Progressing  [] Met  [] Not Met     POSTURE: Patient will correct postural deviations in sitting and standing, to decrease pain and promote long term stability.  [x] Progressing  [] Met  [] Not Met     HEP: Patient will demonstrate independence with HEP in order to progress toward functional independence. [x] Progressing  [] Met  [] Not Met        Long Term Goals:  8 weeks Status Date Met   PAIN: Pt will report worst pain of 2/10 in order to progress toward max functional ability and improve quality of life [x] Progressing  [] Met  [] Not Met     FUNCTION: Patient will demonstrate improved function as indicated by a score of greater than or equal to 66 out of 100 on FOTO. [x] Progressing  [] Met  [] Not Met     MOBILITY: Patient will improve AROM to stated goals, listed in objective measures above, in order to return to maximal functional potential and improve quality of life.  [x] Progressing  [] Met  [] Not Met     STRENGTH: Patient will improve strength to stated goals, listed in objective measures above, in order to improve functional independence and quality of life.  [x] Progressing  [] Met  [] Not Met     Patient will return to normal ADL's, IADL's, community involvement, recreational activities, and work-related activities with less than or equal to 2/10 pain and maximal function.  [x] Progressing  [] Met  [] Not Met        Plan      Plan of care Certification: 3/26/2024 to 5/26/2024.    Andres Martines, PT

## 2024-05-03 ENCOUNTER — OFFICE VISIT (OUTPATIENT)
Dept: HEMATOLOGY/ONCOLOGY | Facility: CLINIC | Age: 69
End: 2024-05-03
Payer: MEDICARE

## 2024-05-03 DIAGNOSIS — T45.1X5A CHEMOTHERAPY-INDUCED PERIPHERAL NEUROPATHY: Primary | ICD-10-CM

## 2024-05-03 DIAGNOSIS — G62.0 CHEMOTHERAPY-INDUCED PERIPHERAL NEUROPATHY: Primary | ICD-10-CM

## 2024-05-03 PROCEDURE — 99213 OFFICE O/P EST LOW 20 MIN: CPT | Mod: 95,,, | Performed by: INTERNAL MEDICINE

## 2024-05-03 NOTE — PROGRESS NOTES
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  - Right ureteral obstruction with indwelling ureteral stent     Previous Treatment:    - 02/2019 - 07/2019: Carboplatin, paclitaxel and Avastin x 6 cycles  - 08/15/2019:  salpingo-oophorectomy, ureterolysis/Omentectomy with complete pathologic response  - 10/2019 - 9/2020 Avastin maintenance   - Carboplatin and Paclitaxel (09/29/2023 - 02/14/2024)    Current Treatment:  Surveillance      The patient location is: Home  The chief complaint leading to consultation is: follow up     Visit type: audiovisual    Face to Face time with patient: 15  45 minutes of total time spent on the encounter, which includes face to face time and non-face to face time preparing to see the patient (eg, review of tests), Obtaining and/or reviewing separately obtained history, Documenting clinical information in the electronic or other health record, Independently interpreting results (not separately reported) and communicating results to the patient/family/caregiver, or Care coordination (not separately reported).       Each patient to whom he or she provides medical services by telemedicine is:  (1) informed of the relationship between the physician and patient and the respective role of any other health care provider with respect to management of the patient; and (2) notified that he or she may decline to receive medical services by telemedicine and may withdraw from such care at any time.    Notes:   Subjective   Casie Gaines is a pleasant 68 y.o. female who presents to clinic for follow up.    Overall, she is doing well and has no acute complaints.  The neuropathy is slowly improving.  She has not started Cymbalta yet as the increased dose of gabapentin is working.  She has no acute complaints today and no signs or symptoms of recurrence.  We  will continue with her regular surveillance.      Review of Systems   Constitutional:  Negative for activity change, appetite change, chills, diaphoresis, fatigue, fever and unexpected weight change.   HENT:  Negative for nosebleeds.    Respiratory:  Negative for shortness of breath.    Cardiovascular:  Positive for leg swelling (left).   Musculoskeletal:  Negative for back pain.   Skin:  Negative for rash.   Neurological:  Positive for numbness. Negative for dizziness, weakness, light-headedness and headaches.   Hematological:  Does not bruise/bleed easily.   Psychiatric/Behavioral:  The patient is not nervous/anxious.      History     Oncology History   Peritoneal carcinomatosis   1/26/2019 Initial Diagnosis    Peritoneal carcinomatosis     2/15/2019 - 7/8/2019 Chemotherapy    Treatment Summary   Plan Name: OP GYN PACLITAXEL CARBOPLATIN (AUC 6) Q3W  Treatment Goal: Palliative  Status: Inactive  Start Date: 2/28/2019  End Date: 6/18/2019  Provider: Will Trevizo Jr., MD  Chemotherapy: bevacizumab (AVASTIN) 15 mg/kg = 1,600 mg in sodium chloride 0.9% 100 mL chemo infusion, 15 mg/kg = 1,600 mg (100 % of original dose 15 mg/kg), Intravenous, Clinic/[facility] 1 time, 6 of 6 cycles  Dose modification: 15 mg/kg (original dose 15 mg/kg, Cycle 1)  Administration: 1,600 mg (2/28/2019), 1,600 mg (3/21/2019), 1,600 mg (4/11/2019), 1,600 mg (5/7/2019), 1,600 mg (5/28/2019), 1,510 mg (6/18/2019)  CARBOplatin (PARAPLATIN) 870 mg in sodium chloride 0.9% 337 mL chemo infusion, 870 mg (100 % of original dose 868.8 mg), Intravenous, Clinic/[facility] 1 time, 6 of 6 cycles  Dose modification:   (original dose 868.8 mg, Cycle 1)  Administration: 870 mg (2/28/2019), 870 mg (3/21/2019), 900 mg (4/11/2019), 870 mg (5/7/2019), 660 mg (5/28/2019), 690 mg (6/18/2019)  PACLitaxel (TAXOL) 175 mg/m2 = 396 mg in sodium chloride 0.9% 566 mL chemo infusion, 175 mg/m2 = 396 mg, Intravenous, [facility] [place] 1 time, 6 of 6 cycles  Dose modification: 140 mg/m2 (80  % of original dose 175 mg/m2, Cycle 5)  Administration: 396 mg (2/28/2019), 396 mg (3/21/2019), 396 mg (4/11/2019), 396 mg (5/7/2019), 318 mg (5/28/2019), 306 mg (6/18/2019)     10/9/2019 - 9/24/2020 Chemotherapy    Treatment Summary   Plan Name: OP BEVACIZUMAB Q3W   Treatment Goal: Maintenance  Status: Inactive  Start Date: 10/9/2019  End Date: 9/24/2020  Provider: Oscar Mckeon MD  Chemotherapy: dexAMETHasone tablet 8 mg, 8 mg (100 % of original dose 8 mg), Oral, Clinic/[facility] 1 time, 2 of 8 cycles  Dose modification: 8 mg (original dose 8 mg, Cycle 8), 8 mg (original dose 8 mg, Cycle 12)  Administration: 8 mg (7/22/2020), 8 mg (8/13/2020)  bevacizumab (AVASTIN) 15 mg/kg = 1,615 mg in sodium chloride 0.9% 100 mL chemo infusion, 15 mg/kg = 1,615 mg, Intravenous, Clinic/[facility] 1 time, 11 of 17 cycles  Administration: 1,615 mg (10/9/2019), 1,615 mg (10/29/2019), 1,615 mg (12/10/2019), 1,615 mg (1/21/2020), 1,600 mg (4/2/2020), 1,615 mg (4/23/2020), 1,600 mg (7/1/2020), 1,600 mg (7/22/2020), 1,600 mg (8/13/2020), 1,795 mg (9/3/2020), 1,795 mg (9/24/2020)     9/29/2023 -  Chemotherapy    Treatment Summary   Plan Name: OP GYN PACLITAXEL CARBOPLATIN desensitization (AUC 5) Q3W  Treatment Goal: Control  Status: Active  Start Date: 9/29/2023  End Date: 10/2/2024 (Planned)  Provider: Ismael Juarez MD  Chemotherapy: CARBOplatin (PARAPLATIN) 650 mg in sodium chloride 0.9% 335 mL chemo infusion, 650 mg (100 % of original dose 648 mg), Intravenous, Clinic/[facility] 1 time, 6 of 17 cycles  Dose modification:   (original dose 648 mg, Cycle 1),   (original dose 853.8 mg, Cycle 2),   (original dose 730 mg, Cycle 3),   (original dose 730 mg, Cycle 4), 5 mg (original dose 730 mg, Cycle 4), 7.3 mg (original dose 730 mg, Cycle 4), 5 mg (original dose 730 mg, Cycle 4, Reason: MD Discretion, Comment: desensitization), 73 mg (original dose 730 mg, Cycle 4), 720 mg (original dose 730 mg, Cycle 4, Reason: MD Discretion, Comment: desensitization),  648.97 mg (original dose 730 mg, Cycle 4, Reason: MD Discretion, Comment: desensitization), 0.61 mg (original dose 730 mg, Cycle 5, Reason: MD Discretion, Comment: desensitization), 5 mg (original dose 730 mg, Cycle 5, Reason: MD Discretion, Comment: desens), 60.5 mg (original dose 730 mg, Cycle 5, Reason: MD Discretion, Comment: desensitization),   (original dose 730 mg, Cycle 5, Reason: MD Discretion, Comment: new scr), 6.05 mg (original dose 730 mg, Cycle 5, Reason: Dose not tolerated, Comment: desensitization)  Administration: 650 mg (9/29/2023), 730 mg (11/10/2023), 710 mg (10/20/2023), 5 mg (1/3/2024), 5 mg (1/3/2024), 75 mg (1/3/2024), 650 mg (1/3/2024), 5 mg (2/14/2024), 5 mg (2/14/2024), 75 mg (2/14/2024), 650 mg (2/14/2024), 5 mg (1/24/2024), 60 mg (1/24/2024), 605 mg (1/24/2024), 5 mg (1/24/2024)  PACLitaxeL (TAXOL) 175 mg/m2 = 402 mg in sodium chloride 0.9% 500 mL chemo infusion, 175 mg/m2 = 402 mg, Intravenous, Clinic/[facility] 1 time, 6 of 17 cycles  Administration: 402 mg (9/29/2023), 402 mg (10/20/2023), 408 mg (11/10/2023), 408 mg (1/3/2024), 408 mg (1/24/2024), 408 mg (2/14/2024)     Malignant neoplasm of right ovary (Resolved)   2/15/2019 Initial Diagnosis    Malignant neoplasm of right ovary     2/15/2019 - 7/8/2019 Chemotherapy    Treatment Summary   Plan Name: OP GYN PACLITAXEL CARBOPLATIN (AUC 6) Q3W  Treatment Goal: Palliative  Status: Inactive  Start Date: 2/28/2019  End Date: 6/18/2019  Provider: Will Trevizo Jr., MD  Chemotherapy: bevacizumab (AVASTIN) 15 mg/kg = 1,600 mg in sodium chloride 0.9% 100 mL chemo infusion, 15 mg/kg = 1,600 mg (100 % of original dose 15 mg/kg), Intravenous, [facility] 1 time, 6 of 6 cycles  Dose modification: 15 mg/kg (original dose 15 mg/kg, Cycle 1)  Administration: 1,600 mg (2/28/2019), 1,600 mg (3/21/2019), 1,600 mg (4/11/2019), 1,600 mg (5/7/2019), 1,600 mg (5/28/2019), 1,510 mg (6/18/2019)  CARBOplatin (PARAPLATIN) 870 mg in sodium chloride 0.9% 337 mL chemo  infusion, 870 mg (100 % of original dose 868.8 mg), Intravenous, Clinic/[facility] 1 time, 6 of 6 cycles  Dose modification:   (original dose 868.8 mg, Cycle 1)  Administration: 870 mg (2/28/2019), 870 mg (3/21/2019), 900 mg (4/11/2019), 870 mg (5/7/2019), 660 mg (5/28/2019), 690 mg (6/18/2019)  PACLitaxel (TAXOL) 175 mg/m2 = 396 mg in sodium chloride 0.9% 566 mL chemo infusion, 175 mg/m2 = 396 mg, Intravenous, Clinic/[facility] 1 time, 6 of 6 cycles  Dose modification: 140 mg/m2 (80 % of original dose 175 mg/m2, Cycle 5)  Administration: 396 mg (2/28/2019), 396 mg (3/21/2019), 396 mg (4/11/2019), 396 mg (5/7/2019), 318 mg (5/28/2019), 306 mg (6/18/2019)     10/9/2019 - 9/24/2020 Chemotherapy    Treatment Summary   Plan Name: OP BEVACIZUMAB Q3W   Treatment Goal: Maintenance  Status: Inactive  Start Date: 10/9/2019  End Date: 9/24/2020  Provider: Oscar Mckeon MD  Chemotherapy: dexAMETHasone tablet 8 mg, 8 mg (100 % of original dose 8 mg), Oral, Clinic/[facility] 1 time, 2 of 8 cycles  Dose modification: 8 mg (original dose 8 mg, Cycle 8), 8 mg (original dose 8 mg, Cycle 12)  Administration: 8 mg (7/22/2020), 8 mg (8/13/2020)  bevacizumab (AVASTIN) 15 mg/kg = 1,615 mg in sodium chloride 0.9% 100 mL chemo infusion, 15 mg/kg = 1,615 mg, Intravenous, Clinic/[facility] 1 time, 11 of 17 cycles  Administration: 1,615 mg (10/9/2019), 1,615 mg (10/29/2019), 1,615 mg (12/10/2019), 1,615 mg (1/21/2020), 1,600 mg (4/2/2020), 1,615 mg (4/23/2020), 1,600 mg (7/1/2020), 1,600 mg (7/22/2020), 1,600 mg (8/13/2020), 1,795 mg (9/3/2020), 1,795 mg (9/24/2020)     Malignant neoplasm of both ovaries (Resolved)   2/18/2019 Initial Diagnosis    Ovarian cancer     10/9/2019 - 9/24/2020 Chemotherapy    Treatment Summary   Plan Name: OP BEVACIZUMAB Q3W   Treatment Goal: Maintenance  Status: Inactive  Start Date: 10/9/2019  End Date: 9/24/2020  Provider: Oscar Mckeon MD  Chemotherapy: dexAMETHasone tablet 8 mg, 8 mg (100 % of original dose 8 mg), Oral, Clinic/[facility] 1  time, 2 of 8 cycles  Dose modification: 8 mg (original dose 8 mg, Cycle 8), 8 mg (original dose 8 mg, Cycle 12)  Administration: 8 mg (7/22/2020), 8 mg (8/13/2020)  bevacizumab (AVASTIN) 15 mg/kg = 1,615 mg in sodium chloride 0.9% 100 mL chemo infusion, 15 mg/kg = 1,615 mg, Intravenous, Clinic/[facility] 1 time, 11 of 17 cycles  Administration: 1,615 mg (10/9/2019), 1,615 mg (10/29/2019), 1,615 mg (12/10/2019), 1,615 mg (1/21/2020), 1,600 mg (4/2/2020), 1,615 mg (4/23/2020), 1,600 mg (7/1/2020), 1,600 mg (7/22/2020), 1,600 mg (8/13/2020), 1,795 mg (9/3/2020), 1,795 mg (9/24/2020)     Ovarian cancer, bilateral   8/15/2019 Initial Diagnosis    Ovarian cancer, bilateral     10/9/2019 - 9/24/2020 Chemotherapy    Treatment Summary   Plan Name: OP BEVACIZUMAB Q3W   Treatment Goal: Maintenance  Status: Inactive  Start Date: 10/9/2019  End Date: 9/24/2020  Provider: Oscar Mckeon MD  Chemotherapy: dexAMETHasone tablet 8 mg, 8 mg (100 % of original dose 8 mg), Oral, Clinic/[facility] 1 time, 2 of 8 cycles  Dose modification: 8 mg (original dose 8 mg, Cycle 8), 8 mg (original dose 8 mg, Cycle 12)  Administration: 8 mg (7/22/2020), 8 mg (8/13/2020)  bevacizumab (AVASTIN) 15 mg/kg = 1,615 mg in sodium chloride 0.9% 100 mL chemo infusion, 15 mg/kg = 1,615 mg, Intravenous, Clinic/[facility] 1 time, 11 of 17 cycles  Administration: 1,615 mg (10/9/2019), 1,615 mg (10/29/2019), 1,615 mg (12/10/2019), 1,615 mg (1/21/2020), 1,600 mg (4/2/2020), 1,615 mg (4/23/2020), 1,600 mg (7/1/2020), 1,600 mg (7/22/2020), 1,600 mg (8/13/2020), 1,795 mg (9/3/2020), 1,795 mg (9/24/2020)     9/29/2023 -  Chemotherapy    Treatment Summary   Plan Name: OP GYN PACLITAXEL CARBOPLATIN desensitization (AUC 5) Q3W  Treatment Goal: Control  Status: Active  Start Date: 9/29/2023  End Date: 10/2/2024 (Planned)  Provider: Ismael Juarez MD  Chemotherapy: CARBOplatin (PARAPLATIN) 650 mg in sodium chloride 0.9% 335 mL chemo infusion, 650 mg (100 % of original dose 648 mg),  Intravenous, Clinic/[facility] 1 time, 6 of 17 cycles  Dose modification:   (original dose 648 mg, Cycle 1),   (original dose 853.8 mg, Cycle 2),   (original dose 730 mg, Cycle 3),   (original dose 730 mg, Cycle 4), 5 mg (original dose 730 mg, Cycle 4), 7.3 mg (original dose 730 mg, Cycle 4), 5 mg (original dose 730 mg, Cycle 4, Reason: MD Discretion, Comment: desensitization), 73 mg (original dose 730 mg, Cycle 4), 720 mg (original dose 730 mg, Cycle 4, Reason: MD Discretion, Comment: desensitization), 648.97 mg (original dose 730 mg, Cycle 4, Reason: MD Discretion, Comment: desensitization), 0.61 mg (original dose 730 mg, Cycle 5, Reason: MD Discretion, Comment: desensitization), 5 mg (original dose 730 mg, Cycle 5, Reason: MD Discretion, Comment: desens), 60.5 mg (original dose 730 mg, Cycle 5, Reason: MD Discretion, Comment: desensitization),   (original dose 730 mg, Cycle 5, Reason: MD Discretion, Comment: new scr), 6.05 mg (original dose 730 mg, Cycle 5, Reason: Dose not tolerated, Comment: desensitization)  Administration: 650 mg (9/29/2023), 730 mg (11/10/2023), 710 mg (10/20/2023), 5 mg (1/3/2024), 5 mg (1/3/2024), 75 mg (1/3/2024), 650 mg (1/3/2024), 5 mg (2/14/2024), 5 mg (2/14/2024), 75 mg (2/14/2024), 650 mg (2/14/2024), 5 mg (1/24/2024), 60 mg (1/24/2024), 605 mg (1/24/2024), 5 mg (1/24/2024)  PACLitaxeL (TAXOL) 175 mg/m2 = 402 mg in sodium chloride 0.9% 500 mL chemo infusion, 175 mg/m2 = 402 mg, Intravenous, Clinic/[facility] 1 time, 6 of 17 cycles  Administration: 402 mg (9/29/2023), 402 mg (10/20/2023), 408 mg (11/10/2023), 408 mg (1/3/2024), 408 mg (1/24/2024), 408 mg (2/14/2024)           Past Medical History:   Diagnosis Date    Anemia     Anxiety     Arthritis     knees    Cancer     ovarian    CHF (congestive heart failure)     Hx antineoplastic chemotherapy     last 6/2019    Hyperlipemia     Hypertension     Neck pain     Ovarian cancer 2019    CHEMO    Peritoneal carcinomatosis 1/26/2019       Past  Surgical History:   Procedure Laterality Date    breast reduction  10/02/2018    CATARACT EXTRACTION Bilateral     2023     SECTION      X 1    COLONOSCOPY N/A 2021    Procedure: COLONOSCOPY;  Surgeon: Paulette Rojas MD;  Location: [facility];  Service: Gastroenterology;  Laterality: N/A;    CYSTOSCOPY W/ URETERAL STENT PLACEMENT Right 2019    Procedure: CYSTOSCOPY, WITH URETERAL STENT INSERTION;  Surgeon: Timmy Santiago IV, MD;  Location: [facility] OR;  Service: Urology;  Laterality: Right;    CYSTOSCOPY W/ URETERAL STENT REMOVAL  10/04/2019    DILATION AND CURETTAGE OF UTERUS      HYSTERECTOMY      RALH for fibroids (still has ovaries)    INSERTION OF VENOUS ACCESS PORT Left 2019    Procedure: INSERTION, VENOUS ACCESS PORT;  Surgeon: Ulisses Monzon MD;  Location: [facility];  Service: General;  Laterality: Left;  Left internal jugular     LYSIS OF ADHESIONS OF URETER N/A 08/15/2019    Procedure: URETEROLYSIS;  Surgeon: Ismael Juarez MD;  Location: [facility];  Service: OB/GYN;  Laterality: N/A;    OMENTECTOMY N/A 08/15/2019    Procedure: OMENTECTOMY;  Surgeon: Ismael Juarez MD;  Location: [facility];  Service: OB/GYN;  Laterality: N/A;    RETROGRADE PYELOGRAPHY Right 2019    Procedure: PYELOGRAM, RETROGRADE;  Surgeon: Timmy Santiago IV, MD;  Location: [facility];  Service: Urology;  Laterality: Right;    ROBOT-ASSISTED LAPAROSCOPIC SALPINGO-OOPHORECTOMY USING DA TIA XI Bilateral 08/15/2019    Procedure: XI ROBOTIC SALPINGO-OOPHORECTOMY;  Surgeon: Ismael Juarez MD;  Location: [facility];  Service: OB/GYN;  Laterality: Bilateral;    TOTAL REDUCTION MAMMOPLASTY  2018    TUBAL LIGATION         Family History   Problem Relation Name Age of Onset    Glaucoma Mother      No Known Problems Father      Colon cancer Brother      Breast cancer Maternal Aunt      Breast cancer Paternal Aunt      Ovarian cancer Paternal Aunt      Anesthesia problems Other cousin     Thrombophilia Neg Hx          Review of patient's allergies indicates:  No Known Allergies    Social History     Tobacco Use    Smoking status: Former     Current packs/day: 0.00     Average packs/day: 1 pack/day for 25.0 years (25.0 ttl pk-yrs)     Types: Cigarettes     Start date: 10/1/1993     Quit date: 10/1/2018     Years since quittin.5    Smokeless tobacco: Never    Tobacco comments:     States started quit 2 months ago after 30 years   Substance Use Topics    Alcohol use: Yes     Comment: occasionally  No alcohol 72h prior to sx    Drug use: No       Labs   Labs:  No visits with results within 2 Day(s) from this visit.   Latest known visit with results is:   Lab Visit on 2024   Component Date Value Ref Range Status    Phosphorus 2024 2.9  2.7 - 4.5 mg/dL Final    Magnesium 2024 1.4 (L)  1.6 - 2.6 mg/dL Final    Sodium 2024 143  136 - 145 mmol/L Final    Potassium 2024 3.7  3.5 - 5.1 mmol/L Final    Chloride 2024 105  95 - 110 mmol/L Final    CO2 2024 28  23 - 29 mmol/L Final    Glucose 2024 125 (H)  70 - 110 mg/dL Final    BUN 2024 11  8 - 23 mg/dL Final    Creatinine 2024 0.8  0.5 - 1.4 mg/dL Final    Calcium 2024 9.6  8.7 - 10.5 mg/dL Final    Total Protein 2024 6.8  6.0 - 8.4 g/dL Final    Albumin 2024 3.7  3.5 - 5.2 g/dL Final    Total Bilirubin 2024 0.5  0.1 - 1.0 mg/dL Final    Comment: For infants and newborns, interpretation of results should be based  on gestational age, weight and in agreement with clinical  observations.    Premature Infant recommended reference ranges:  Up to 24 hours.............<8.0 mg/dL  Up to 48 hours............<12.0 mg/dL  3-5 days..................<15.0 mg/dL  6-29 days.................<15.0 mg/dL      Alkaline Phosphatase 2024 103  55 - 135 U/L Final    AST 2024 21  10 - 40 U/L Final    ALT 2024 22  10 - 44 U/L Final    eGFR 2024 >60  >60 mL/min/1.73 m^2 Final    Anion Gap 2024  10  8 - 16 mmol/L Final    WBC 04/17/2024 6.39  3.90 - 12.70 K/uL Final    RBC 04/17/2024 3.75 (L)  4.00 - 5.40 M/uL Final    Hemoglobin 04/17/2024 10.9 (L)  12.0 - 16.0 g/dL Final    Hematocrit 04/17/2024 34.5 (L)  37.0 - 48.5 % Final    MCV 04/17/2024 92  82 - 98 fL Final    MCH 04/17/2024 29.1  27.0 - 31.0 pg Final    MCHC 04/17/2024 31.6 (L)  32.0 - 36.0 g/dL Final    RDW 04/17/2024 16.0 (H)  11.5 - 14.5 % Final    Platelets 04/17/2024 233  150 - 450 K/uL Final    MPV 04/17/2024 9.5  9.2 - 12.9 fL Final    Immature Granulocytes 04/17/2024 0.2  0.0 - 0.5 % Final    Gran # (ANC) 04/17/2024 4.1  1.8 - 7.7 K/uL Final    Immature Grans (Abs) 04/17/2024 0.01  0.00 - 0.04 K/uL Final    Comment: Mild elevation in immature granulocytes is non specific and   can be seen in a variety of conditions including stress response,   acute inflammation, trauma and pregnancy. Correlation with other   laboratory and clinical findings is essential.      Lymph # 04/17/2024 1.9  1.0 - 4.8 K/uL Final    Mono # 04/17/2024 0.3  0.3 - 1.0 K/uL Final    Eos # 04/17/2024 0.1  0.0 - 0.5 K/uL Final    Baso # 04/17/2024 0.06  0.00 - 0.20 K/uL Final    nRBC 04/17/2024 0  0 /100 WBC Final    Gran % 04/17/2024 63.3  38.0 - 73.0 % Final    Lymph % 04/17/2024 29.0  18.0 - 48.0 % Final    Mono % 04/17/2024 5.2  4.0 - 15.0 % Final    Eosinophil % 04/17/2024 1.4  0.0 - 8.0 % Final    Basophil % 04/17/2024 0.9  0.0 - 1.9 % Final    Differential Method 04/17/2024 Automated   Final        Imaging   CT Chest Abdomen Pelvis With IV Contrast (XPD) NO Oral Contrast 12/12/2023  Details    Reading Physician Reading Date Result Priority   May, Michele FLORES MD  [phone] 12/12/2023 STAT     Narrative & Impression  EXAMINATION:  CT CHEST ABDOMEN PELVIS WITH IV CONTRAST (XPD)     CLINICAL HISTORY:  Hypokalemia,follow up ovarian cancer on treatment; evaluate treatment response for subsequent treatment planning.     TECHNIQUE:  Axial CT imaging was performed through  the chest, abdomen and pelvis with  100cc  of intravenous contrast. Multiplanar reformats were performed and interpreted.     COMPARISON:  08/21/2023, 09/29/2022 and 09/24/2020     FINDINGS:  Chest:     The heart, great vessels, and mediastinal structures are within normal limits. No thoracic adenopathy.  Left-sided MediPort in the SVC.  Aortic atherosclerosis.     The lungs are clear bilaterally. No pleural effusions.     Abdomen:     The liver, spleen, kidneys, adrenal glands are within normal limits.  Stable 10 mm uncinate process pancreatic cyst.  No biliary ductal dilatation.     The gallbladder is unremarkable.     No free fluid, free air, or inflammatory change.     The small bowel is within normal limits.  Colonic diverticulosis.  Supraumbilical abdominal wall hernia containing a nondistended segment of the transverse colon.  No evidence of strangulation or incarceration.  The appendix is normal.     Aortic atherosclerosis without evidence of aneurysm.     Pelvis:     The urinary bladder is unremarkable. The uterus has been removed.  No free pelvic fluid or adenopathy.     No significant osseous abnormality is identified.  No suspicious osseous lesions.     Impression:     No CT evidence of recurrent or metastatic disease in the chest, abdomen or pelvis.     All CT scans at this facility are performed  using dose modulation techniques as appropriate to performed exam including the following:  automated exposure control; adjustment of mA and/or kV according to the patients size (this includes techniques or standardized protocols for targeted exams where dose is matched to indication/reason for exam: i.e. extremities or head);  iterative reconstruction technique.        NM PET CT FDG SKULL BASE TO MID THIGH - 02/26/2024  FINDINGS:  Head/neck: There is normal physiologic uptake noted within the visualized brain parenchyma. No FDG avid adenopathy within the neck.     Chest: No FDG avid pulmonary nodules/masses.  No FDG avid mediastinal, hilar or axillary lymph nodes.A left-sided MediPort catheter is noted.     Abdomen/Pelvis: Normal physiologic uptake noted within the liver, spleen, urinary tract, and bowel.The adrenals are unremarkable.  There are a couple of left periaortic retroperitoneal lymph nodes that demonstrate an SUV max of up to 2.8 and 3.1.  Findings are nonspecific.  There is diverticulosis seen scattered throughout the colon.  The midportion of the transverse colon is contained within a midline ventral hernia.  No evidence for bowel obstruction..     Skeletal: No FDG avid osseus lesions identified.     Impression:  1. Low level nonspecific uptake associated with a couple of left periaortic retroperitoneal lymph nodes.  It is possible these lymph nodes may just be reactive in nature.  Continued follow-up recommended.      NM PET CT FDG SKULL BASE TO MID THIGH - 04/08/2024  FINDINGS:  Quality of the study: Adequate.     SUV max of the liver parenchyma is 3.7     Neck: No abnormal FDG avidity.  No lymphadenopathy or mass.     Chest: No FDG avid mediastinal, hilar, or axillary lymphadenopathy.  No pleural effusion.  No pulmonary nodule.     Abdomen/pelvis: Interval resolution of the low level FDG avidity of the left para-aortic lymph nodes.  Lymph nodes are also smaller compared to the prior exam and normal in size on this exam.  No abnormal FDG avidity.  No ascites.  No lymphadenopathy     Skeletal structures: No abnormal FDG avidity.  No focal lytic or sclerotic lesion in the osseous structures.     Physiologic uptake of the tracer is present within the brain, salivary glands, myocardium, GI and  tracts.     Incidental CT findings: No interval change in the umbilical hernia containing a loop of colon.  Left IJ MediPort with its tip in the SVC.    Impression:  No FDG PET/CT findings to suggest recurrent or metastatic disease.  Interval resolution of the low level FDG avidity seen in the left para-aortic  retroperitoneal lymph nodes on the prior exam.     Assessment and Plan   Recurrent, Stage IV Serous Ovarian Cancer with Peritoneal Carcinomatosis - 01/2019   Treated with neoadjuvant chemotherapy with carboplatin paclitaxel and Avastin (02/2019 - 07/2019), followed by surgical resection in August 2019.  Patient was then treated with Avastin maintenance  08/21/2023 CT CAP: Marked interval disease progression with severe peritoneal carcinomatosis as detailed above.  12/12/2023 CT CAP: No CT evidence of recurrent or metastatic disease in the chest, abdomen or pelvis.  : 7 >> 21 >> 32 >> 64 >> 11  Started Carbo/Taxol 09/29/2023  C1 no complications  C2 and C3 (11/10/23): reaction consisting of feeling presyncopal, SOB, sweating; vitals significant for hypoxia and hypotension; evaluated by AI and recs for desensitization  C4 -C6with desensitization  Per medical record, patient declined treatment until after the new year  CT CAP 12/12/23 showed CLYDE  PET-CT 02/26/24: Low level nonspecific uptake associated with a couple of left periaortic retroperitoneal lymph nodes.  It is possible these lymph nodes may just be reactive in nature  PET-CT 04/08/2024:  No FDG PET/CT findings to suggest recurrent or metastatic disease. Interval resolution of the low level FDG avidity seen in the left para-aortic retroperitoneal lymph nodes on the prior exam.   Advised follow up with Gyn Onc  No maintenance therapy recommended      Chemotherapy Induced Neuropathy  Intermittent neuropathy in feet  Increased gabapentin working  Can hold cymbalta for now;  if no improvement will start Cymbalta      Left Groin Pain  Only experiences when bladder full  Will obtain UA and ultrasound of the groin; she may need early gyn exam        Chronic Medical Conditions  Osteoarthritis  HTN  Anxiety        Med Onc Chart Routing      Follow up with physician 2 months. Doris Miller - appt after PET-CT   Follow up with [facility]    Infusion scheduling note   Port  Flush in 2 weeks at [facility]   Injection scheduling note    Labs CBC, CMP, magnesium, phosphorus and other   Scheduling:  Preferred lab:  Lab interval:  Other - CA 1-25   Imaging PET scan   schedule early July   Pharmacy appointment    Other referrals                The patient was seen, interviewed and examined. Pertinent lab and radiologic studies were reviewed. Pt instructed to call should they develop concerning signs/symptoms or have further questions.        Portions of the record may have been created with voice recognition software. Occasional wrong-word or sound-a-likez substitutions may have occurred due to the inherent limitations of voice recognition software. Read the chart carefully and recognize, using context, where substitutions have occurred.      Torri Pugh MD    Hematology/Oncology

## 2024-05-06 ENCOUNTER — OFFICE VISIT (OUTPATIENT)
Dept: INTERNAL MEDICINE | Facility: CLINIC | Age: 69
End: 2024-05-06
Payer: MEDICARE

## 2024-05-06 ENCOUNTER — TELEPHONE (OUTPATIENT)
Dept: INTERNAL MEDICINE | Facility: CLINIC | Age: 69
End: 2024-05-06
Payer: MEDICARE

## 2024-05-06 DIAGNOSIS — I87.2 VENOUS INSUFFICIENCY OF LEFT LEG: Primary | ICD-10-CM

## 2024-05-06 PROCEDURE — 99214 OFFICE O/P EST MOD 30 MIN: CPT | Mod: 95,,, | Performed by: FAMILY MEDICINE

## 2024-05-06 NOTE — TELEPHONE ENCOUNTER
----- Message from Nilsa Coyle sent at 5/6/2024 11:37 AM CDT -----  Contact: Patient, [phone]  Calling to schedule an appointment for left leg and foot swelling. Please call her. Thanks.

## 2024-05-06 NOTE — PROGRESS NOTES
Casie Frias Givens  05/06/2024  [id number]    Rossana Ang MD  Patient Care Team:  Rossana Ang MD as PCP - General (Family Medicine)  Radha Foley LPN as Care Coordinator (Internal Medicine)  Gerri Meyers RD (Inactive) as Dietitian (Nutrition)  Jessica Brink, Gwendolyn as Hypertension Digital Medicine Clinician (Pharmacist)  Rossana Ang MD as Hypertension Digital Medicine Responsible Provider (Family Medicine)  Madelaine Kraus (Inactive) as Digital Medicine Health     The patient location is: Home  The chief complaint leading to consultation is: swelling    Visit type: audiovisual    Face to Face time with patient: 3:15  10 minutes of total time spent on the encounter, which includes face to face time and non-face to face time preparing to see the patient (eg, review of tests), Obtaining and/or reviewing separately obtained history, Documenting clinical information in the electronic or other health record, Independently interpreting results (not separately reported) and communicating results to the patient/family/caregiver, or Care coordination (not separately reported).         Each patient to whom he or she provides medical services by telemedicine is:  (1) informed of the relationship between the physician and patient and the respective role of any other health care provider with respect to management of the patient; and (2) notified that he or she may decline to receive medical services by telemedicine and may withdraw from such care at any time.    Notes:        Visit Type:an urgent visit for a new problem    Chief Complaint: Swelling  History of Present Illness:  Increase in swelling- left foot      Saw Cards in Feb  Dr. Ferrara  2/28/24  Has been having Sob since 9/23-10/23  Had chemo/radiation/surgery for ovarian cancer x 2   Has been retaining fluid, LE swelling  Had to go to the ED, received IV lasix  Was just started on lasix at home- thinks SOB has gotten worse     Wearing  compression stockings  Watching salt intake, but eats shellfish and fried fish, drinking lost sodas  November, had lower extremity left leg venous ultrasound with no DVT  Echo 2/24 EF 55-60%, normal diastolic function, concentric hypertrophy, mild MR  BNP has remained low    He had lower K, taking Lasix without K supplement  Cards told her to hold Lasix.  BNP 14  When she is not on the foot, the swelling goes down  She hasn't been taking the lasix.   She reports that the fluid is in the leg.  She does not wear the compression stocking.  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  Interval resolution of the low level FDG avidity seen in the left para-aortic retroperitoneal lymph nodes on the prior exam.    Answers submitted by the patient for this visit:  Review of Systems Questionnaire (Submitted on 5/6/2024)  activity change: No  unexpected weight change: No  rhinorrhea: No  trouble swallowing: No  visual disturbance: No  chest tightness: No  polyuria: No  difficulty urinating: No  menstrual problem: No  joint swelling: No  arthralgias: No  confusion: No  dysphoric mood: No      History:  Past Medical History:   Diagnosis Date    Anemia     Anxiety     Arthritis     knees    Cancer     ovarian    CHF (congestive heart failure)     Hx antineoplastic chemotherapy     last 6/2019    Hyperlipemia     Hypertension     Neck pain     Ovarian cancer 2019    CHEMO    Peritoneal carcinomatosis 1/26/2019     Past  Surgical History:   Procedure Laterality Date    breast reduction  10/02/2018    CATARACT EXTRACTION Bilateral     2023     SECTION      X 1    COLONOSCOPY N/A 2021    Procedure: COLONOSCOPY;  Surgeon: Paulette Rojas MD;  Location: [facility];  Service: Gastroenterology;  Laterality: N/A;    CYSTOSCOPY W/ URETERAL STENT PLACEMENT Right 2019    Procedure: CYSTOSCOPY, WITH URETERAL STENT INSERTION;  Surgeon: Timmy Santiago IV, MD;  Location: [facility] OR;  Service: Urology;  Laterality: Right;    CYSTOSCOPY W/ URETERAL STENT REMOVAL  10/04/2019    DILATION AND CURETTAGE OF UTERUS      HYSTERECTOMY      RALH for fibroids (still has ovaries)    INSERTION OF VENOUS ACCESS PORT Left 2019    Procedure: INSERTION, VENOUS ACCESS PORT;  Surgeon: Ulisses Monzon MD;  Location: [facility];  Service: General;  Laterality: Left;  Left internal jugular     LYSIS OF ADHESIONS OF URETER N/A 08/15/2019    Procedure: URETEROLYSIS;  Surgeon: Ismael Juarez MD;  Location: [facility];  Service: OB/GYN;  Laterality: N/A;    OMENTECTOMY N/A 08/15/2019    Procedure: OMENTECTOMY;  Surgeon: Ismael Juarez MD;  Location: [facility];  Service: OB/GYN;  Laterality: N/A;    RETROGRADE PYELOGRAPHY Right 2019    Procedure: PYELOGRAM, RETROGRADE;  Surgeon: Timmy Santiago IV, MD;  Location: [facility];  Service: Urology;  Laterality: Right;    ROBOT-ASSISTED LAPAROSCOPIC SALPINGO-OOPHORECTOMY USING DA TIA XI Bilateral 08/15/2019    Procedure: XI ROBOTIC SALPINGO-OOPHORECTOMY;  Surgeon: Ismael Juarez MD;  Location: [facility];  Service: OB/GYN;  Laterality: Bilateral;    TOTAL REDUCTION MAMMOPLASTY  2018    TUBAL LIGATION       Family History   Problem Relation Name Age of Onset    Glaucoma Mother      No Known Problems Father      Colon cancer Brother      Breast cancer Maternal Aunt      Breast cancer Paternal Aunt      Ovarian cancer Paternal Aunt      Anesthesia problems Other cousin     Thrombophilia Neg Hx        Social History     Socioeconomic History    Marital status:    Tobacco Use    Smoking status: Former     Current packs/day: 0.00     Average packs/day: 1 pack/day for 25.0 years (25.0 ttl pk-yrs)     Types: Cigarettes     Start date: 10/1/1993     Quit date: 10/1/2018     Years since quittin.6    Smokeless tobacco: Never    Tobacco comments:     States started quit 2 months ago after 30 years   Substance and Sexual Activity    Alcohol use: Yes     Comment: occasionally  No alcohol 72h prior to sx    Drug use: No    Sexual activity: Not Currently     Partners: Male     Comment: hyst; mut monog   Social History Narrative    Live w/ spouse and 2 dogs      Social Determinants of Health     Financial Resource Strain: Low Risk  (2023)    Overall Financial Resource Strain (CARDIA)     Difficulty of Paying Living Expenses: Not hard at all   Food Insecurity: No Food Insecurity (2023)    Hunger Vital Sign     Worried About Running Out of Food in the Last Year: Never true     Ran Out of Food in the Last Year: Never true   Transportation Needs: No Transportation Needs (2023)    PRAPARE - Transportation     Lack of Transportation (Medical): No     Lack of Transportation (Non-Medical): No   Physical Activity: Unknown (2023)    Exercise Vital Sign     Days of Exercise per Week: 0 days   Stress: No Stress Concern Present (2023)    [employer] - Occupational Stress Questionnaire     Feeling of Stress : Not at all   Housing Stability: Unknown (2023)    Housing Stability Vital Sign     Unable to Pay for Housing in the Last Year: No     Unstable Housing in the Last Year: No     Patient Active Problem List   Diagnosis    Hypertension    Osteoarthritis of both knees    History of CHF (congestive heart failure)    Hyperlipidemia    Peritoneal carcinomatosis    Morbid obesity with BMI of 40.0-44.9, adult    Status post placement of ureteral stent    Abnormal  ECG    Dyspnea on exertion    Pulmonary HTN    Ovarian cancer, bilateral    S/P BSO (bilateral salpingo-oophorectomy)    Encounter for antineoplastic chemotherapy    Anxiety    DDD (degenerative disc disease), cervical    Family history of colon cancer    Acute right-sided thoracic back pain    Acute right-sided low back pain without sciatica    Sciatic nerve pain    Vasovagal reaction    Drug reaction    Infusion reaction    Left groin pain    Pruritus    Decreased functional mobility and endurance    Decreased range of motion of lumbar spine    Proximal muscle weakness    Sleep-disordered breathing    Chemotherapy-induced peripheral neuropathy     Review of patient's allergies indicates:  No Known Allergies    The following were reviewed at this visit: active problem list, medication list, allergies, family history, social history, and health maintenance.    Medications:  Current Outpatient Medications on File Prior to Visit   Medication Sig Dispense Refill    albuterol 90 mcg/actuation inhaler Inhale 2 puffs into the lungs every 4 (four) hours as needed for Wheezing. Rescue 18 g 0    ALPRAZolam (XANAX) 0.25 MG tablet Take 1 tablet (0.25 mg total) by mouth 3 (three) times daily as needed for Anxiety. 60 tablet 2    amLODIPine (NORVASC) 5 MG tablet Take 2 tablets (10 mg total) by mouth once daily. 180 tablet 3    atenoloL (TENORMIN) 25 MG tablet Take 1 tablet (25 mg total) by mouth once daily. 90 tablet 2    azelastine (ASTELIN) 137 mcg (0.1 %) nasal spray 1 spray (137 mcg total) by Nasal route 2 (two) times daily. 30 mL 0    biotin 1 mg tablet Take 1,000 mcg by mouth once daily.       cetirizine (ZYRTEC) 10 MG tablet Take 1 tablet (10 mg total) by mouth once daily. 30 tablet 5    dexAMETHasone (DECADRON) 4 MG Tab Take 2 tablets (8 mg total) by mouth once daily. Take as directed on days 2, 3 and 4 of your chemotherapy cycle. 6 tablet 11    diclofenac sodium (VOLTAREN) 1 % Gel Apply once a day to back as needed 100  g 1    EPINEPHrine (EPIPEN) 0.3 mg/0.3 mL AtIn Inject 0.3 mLs (0.3 mg total) into the muscle once. for 1 dose 2 each 0    fluticasone propionate (FLONASE) 50 mcg/actuation nasal spray 1 spray (50 mcg total) by Each Nostril route every 12 (twelve) hours as needed for Rhinitis. 16 g 0    furosemide (LASIX) 40 MG tablet Take 1 tablet (40 mg total) by mouth once daily. 30 tablet 11    gabapentin (NEURONTIN) 300 MG capsule Take 1 capsule (300 mg total) by mouth 3 (three) times daily. 90 capsule 3    ginkgo biloba 40 mg Tab Take 40 mg by mouth.      magnesium oxide (MAG-OX) 400 mg (241.3 mg magnesium) tablet Take 1 tablet (400 mg total) by mouth once daily. 1 tablet 0    multivitamin with minerals tablet Take 1 tablet by mouth once daily.       OLANZapine (ZYPREXA) 5 MG tablet Take 1 tablet (5 mg total) by mouth every evening on days 1 through 4 of your chemotherapy regimen. 4 tablet 11    olmesartan-hydrochlorothiazide (BENICAR HCT) 40-25 mg per tablet Take 1 tablet by mouth once daily. 90 tablet 1    ondansetron (ZOFRAN-ODT) 8 MG TbDL Dissolve 1 tablet (8 mg total) under the tongue every 6 (six) hours as needed. 30 tablet 11    pantoprazole (PROTONIX) 40 MG tablet Take 1 tablet (40 mg total) by mouth once daily. 90 tablet 1    potassium chloride SA (K-DUR,KLOR-CON) 20 MEQ tablet Take 1 tablet (20 mEq total) by mouth 2 (two) times daily. 2 tablet 0    prochlorperazine (COMPAZINE) 5 MG tablet Take 2 tablets (10 mg total) by mouth every 6 (six) hours as needed (nausea or vomiting). 30 tablet 11    rosuvastatin (CRESTOR) 10 MG tablet Take 1 tablet (10 mg total) by mouth once daily. 90 tablet 1    sertraline (ZOLOFT) 25 MG tablet Take 1 tablet (25 mg total) by mouth once daily. 90 tablet 0    zinc gluconate 50 mg tablet Take 50 mg by mouth once daily.       No current facility-administered medications on file prior to visit.       Medications have been reviewed and reconciled with patient at this visit.  Barriers to  medications reviewed with patient.    Adverse reactions to current medications reviewed with patient..    Over the counter medications reviewed and reconciled with patient.    Exam:  Wt Readings from Last 3 Encounters:   04/17/24 116.4 kg (256 lb 9.9 oz)   04/15/24 116 kg (255 lb 13.5 oz)   03/26/24 115.4 kg (254 lb 6.6 oz)     Temp Readings from Last 3 Encounters:   04/17/24 97.2 °F (36.2 °C) (Temporal)   03/26/24 97.7 °F (36.5 °C)   03/04/24 97.7 °F (36.5 °C) (Oral)     BP Readings from Last 3 Encounters:   04/17/24 125/69   04/15/24 119/80   03/26/24 105/64     Pulse Readings from Last 3 Encounters:   04/17/24 80   04/15/24 81   03/26/24 76     There is no height or weight on file to calculate BMI.      Review of Systems   HENT:  Negative for hearing loss.    Eyes:  Negative for discharge.   Respiratory:  Negative for wheezing.    Cardiovascular:  Positive for leg swelling. Negative for chest pain and palpitations.   Gastrointestinal:  Negative for blood in stool, constipation, diarrhea and vomiting.   Genitourinary:  Negative for dysuria and hematuria.   Musculoskeletal:  Positive for joint pain. Negative for neck pain.   Neurological:  Negative for weakness and headaches.   Endo/Heme/Allergies:  Negative for polydipsia.     Physical Exam  Nursing note reviewed.   Pulmonary:      Effort: Pulmonary effort is normal. No respiratory distress.   Neurological:      Mental Status: She is alert and oriented to person, place, and time.   Psychiatric:         Mood and Affect: Mood normal.         Behavior: Behavior normal.         Thought Content: Thought content normal.         Judgment: Judgment normal.         Laboratory Reviewed ({Yes)  Lab Results   Component Value Date    WBC 6.39 04/17/2024    HGB 10.9 (L) 04/17/2024    HCT 34.5 (L) 04/17/2024     04/17/2024    CHOL 163 01/06/2022    TRIG 71 01/06/2022    HDL 51 01/06/2022    ALT 22 04/17/2024    AST 21 04/17/2024     04/17/2024    K 3.7 04/17/2024      04/17/2024    CREATININE 0.8 04/17/2024    BUN 11 04/17/2024    CO2 28 04/17/2024    TSH 2.460 09/20/2023    INR 1.0 01/28/2019    HGBA1C 5.8 (H) 06/12/2023       Diagnoses and all orders for this visit:    Venous insufficiency of left leg  -     Ambulatory referral/consult to Vascular Surgery; Future    Wear compression stocking  Refer to CVT, vein eval          Care Plan/Goals: Reviewed    Goals          Patient Stated      Blood Pressure < 130/80 (pt-stated)        Other      Exercise at least 150 minutes per week.       Maintain a low sodium diet       American Heart Association (AHA) guidelines recommend less than 1500 mg of dietary salt per day.        Take at least one BP reading per week at various times of the day             Follow up: No follow-ups on file.    After visit summary was printed and given to patient upon discharge today.  Patient goals and care plan are included in After Visit Summary.

## 2024-05-07 ENCOUNTER — CLINICAL SUPPORT (OUTPATIENT)
Dept: REHABILITATION | Facility: HOSPITAL | Age: 69
End: 2024-05-07
Payer: MEDICARE

## 2024-05-07 DIAGNOSIS — Z74.09 DECREASED FUNCTIONAL MOBILITY AND ENDURANCE: Primary | ICD-10-CM

## 2024-05-07 DIAGNOSIS — M53.86 DECREASED RANGE OF MOTION OF LUMBAR SPINE: ICD-10-CM

## 2024-05-07 DIAGNOSIS — M62.81 PROXIMAL MUSCLE WEAKNESS: ICD-10-CM

## 2024-05-07 PROCEDURE — 97112 NEUROMUSCULAR REEDUCATION: CPT | Mod: PN

## 2024-05-07 PROCEDURE — 97110 THERAPEUTIC EXERCISES: CPT | Mod: PN

## 2024-05-07 NOTE — PROGRESS NOTES
[facility]   Physical Therapy Treatment Note      Name: Casie Frias [facility]  Clinic Number: [id number]    Therapy Diagnosis:   Encounter Diagnoses   Name Primary?    Decreased functional mobility and endurance Yes    Decreased range of motion of lumbar spine     Proximal muscle weakness        Physician: Rossana Ang MD    Visit Date: 5/7/2024    Physician Orders: PT Eval and Treat  Medical Diagnosis from Referral: Degenerative disc disease, lumbar [M51.36]   Evaluation Date: 3/26/2024  Authorization Period Expiration: 3/25/2025  Plan of Care Expiration: 5/26/2024  Progress Note Due: 4/26/2024  Visit # / Visits authorized: 1/1   FOTO: 1/3 (last performed on 3/26/2024)     Precautions: Standard and CHF     Time In: 0940  Time Out: 1033  Total Billable Time (timed & untimed codes): 53 minutes    PTA Visit #: 0/5       Subjective     Patient reports: low back pain.  She was compliant with home exercise program.  Response to previous treatment: N/A  Functional change: N/A    Pain: 5/10  Location:  lumbar region, R>L     Objective      Objective Measures updated at progress report unless specified.     Treatment     Casie received the treatments listed below:        THERAPEUTIC EXERCISES to develop strength, ROM, and core stabilization for 25 minutes including :    Exercises   Details     NuStep   x  5 min for repetitive hip motion and postural cues     Seated ball roll outs x 1minute     Prone hip extension  x 2x10     Prone quad stretch  x 3x20 seconds     Clamshells  x 2x10 with red band                                    NEUROMUSCULAR RE-EDUCATION ACTIVITIES to improve Balance, Coordination, Kinesthetic, Proprioception, and Posture for 30 minutes.  The following were included:     Activity   Details     Matrix lumbar extension  x 60# x20     Matrix rotation x 26# x20     Posterior pelvic tilt with cues x 3x10 with 4 second hold     Supine DKTC with ball  x 2x15      Lower trunk  rotation  x 2x15      Shuttle  x 5 bands    Sit to stand  x 2x 10            THERAPEUTIC ACTIVITIES to improve dynamic and functional performance for --- minutes including:    Activity   Details     Pushing sled                   MANUAL THERAPY TECHNIQUES including Joint mobilizations, Manual traction, and Soft tissue Mobilization were applied to the low back for 00 minutes:    Exercises   Details     Manual lumbar traction                    Supervised modalities after being cleared for contradictions: IFC Electrical Stimulation:  Casie received IFC Electrical Stimulation for pain control applied to the ---. Pt received stimulation for --- minutes. Casie tolderated treatment well without any adverse effects.          Patient Education and Home Exercises       Education provided:   - Home program    Written Home Exercises Provided: Patient instructed to cont prior HEP. Exercises were reviewed and Casie was able to demonstrate them prior to the end of the session.  Casie demonstrated good  understanding of the education provided. See Electronic Medical Record under Patient Instructions for exercises provided during therapy sessions    Assessment     The patient has low back pain and complains of weakness.  She was instructed in core and lower extremity strengthening.    Casie Is progressing well towards her goals.   Patient prognosis is Good.     Patient will continue to benefit from skilled outpatient physical therapy to address the deficits listed in the problem list box on initial evaluation, provide pt/family education and to maximize pt's level of independence in the home and community environment.     Patient's spiritual, cultural and educational needs considered and pt agreeable to plan of care and goals.     Anticipated barriers to physical therapy: None    Short Term Goals:  4 weeks Status  Date Met   PAIN: Pt will report worst pain of 6/10 in order to progress toward max functional ability and improve  quality of life. [x] Progressing  [] Met  [] Not Met     FUNCTION: Patient will demonstrate improved function as indicated by a score of greater than or equal to 54 out of 100 on FOTO. [x] Progressing  [] Met  [] Not Met     MOBILITY: Patient will improve AROM to 50% of stated goals, listed in objective measures above, in order to progress towards independence with functional activities.  [x] Progressing  [] Met  [] Not Met     STRENGTH: Patient will improve strength to 50% of stated goals, listed in objective measures above, in order to progress towards independence with functional activities. [x] Progressing  [] Met  [] Not Met     POSTURE: Patient will correct postural deviations in sitting and standing, to decrease pain and promote long term stability.  [x] Progressing  [] Met  [] Not Met     HEP: Patient will demonstrate independence with HEP in order to progress toward functional independence. [x] Progressing  [] Met  [] Not Met        Long Term Goals:  8 weeks Status Date Met   PAIN: Pt will report worst pain of 2/10 in order to progress toward max functional ability and improve quality of life [x] Progressing  [] Met  [] Not Met     FUNCTION: Patient will demonstrate improved function as indicated by a score of greater than or equal to 66 out of 100 on FOTO. [x] Progressing  [] Met  [] Not Met     MOBILITY: Patient will improve AROM to stated goals, listed in objective measures above, in order to return to maximal functional potential and improve quality of life.  [x] Progressing  [] Met  [] Not Met     STRENGTH: Patient will improve strength to stated goals, listed in objective measures above, in order to improve functional independence and quality of life.  [x] Progressing  [] Met  [] Not Met     Patient will return to normal ADL's, IADL's, community involvement, recreational activities, and work-related activities with less than or equal to 2/10 pain and maximal function.  [x] Progressing  [] Met  [] Not  Met        Plan      Plan of care Certification: 3/26/2024 to 5/26/2024.    Andres Martines, PT

## 2024-05-10 ENCOUNTER — HOSPITAL ENCOUNTER (OUTPATIENT)
Dept: SLEEP MEDICINE | Facility: HOSPITAL | Age: 69
Discharge: HOME | End: 2024-05-10
Attending: HOSPITALIST
Payer: MEDICARE

## 2024-05-10 DIAGNOSIS — G47.30 SLEEP-DISORDERED BREATHING: ICD-10-CM

## 2024-05-10 DIAGNOSIS — G47.33 OSA (OBSTRUCTIVE SLEEP APNEA): Primary | ICD-10-CM

## 2024-05-10 PROCEDURE — 95806 SLEEP STUDY UNATT&RESP EFFT: CPT | Performed by: INTERNAL MEDICINE

## 2024-05-10 PROCEDURE — 95806 SLEEP STUDY UNATT&RESP EFFT: CPT | Mod: 26,,, | Performed by: INTERNAL MEDICINE

## 2024-05-10 NOTE — PROCEDURES
PHYSICIAN INTERPRETATION AND COMMENTS: Findings are consistent with very severe, non-positional obstructive sleep  apnea(JERALD).  CLINICAL HISTORY: 68 year old female presented with: 16.75 inch neck, BMI of 39.8, an [place] sleepiness score of 4,  history of hypertension and symptoms of nocturnal snoring and waking up choking. Based on the clinical history, the  patient has a high pre-test probability of having Moderate JERALD.  SLEEP STUDY FINDINGS: Patient underwent a 2 night Home Sleep Test and by behavioral criteria, slept for approximately  13.34 hours, with a sleep latency of 6 minutes and a sleep efficiency of 97%. Very Severe sleep disordered breathing  (AHI=91) is noted based on a 4% hypopnea desaturation criteria. The patient slept supine 44.2% of the night based on valid  recording time of 13.16 hours. The apneas/hypopneas are accompanied by severe oxygen desaturation (percent time below  90% SpO2: 29.5%, Min SpO2: 50.5%). The average desaturation across all sleep disordered breathing events is 9.5%. Snoring  occurs for 31.3% (30 dB) of the study, 19.7% is very loud. The mean pulse rate is 67 BPM, with frequent pulse rate variability  (50 events with >= 6 BPM increase/decrease per hour), with an absence of expected pulse rate variability, suggesting  autonomic dysfunction.  TREATMENT CONSIDERATIONS: Consider nasal continuous positive airway pressure (CPAP/AutoPAP) as the initial  treatment choice for Very Severe obstructive sleep apnea. If the patient fails CPAP therapy, consider supplemental oxygen.  A mandibular advancement splint (MAS) or referral to an ENT surgeon for modification to the airway should be considered  to reduce the risk of mortality caused by Very Severe JERALD if the patient prefers an alternative therapy or the CPAP trial is  unsuccessful.  DISEASE MANAGEMENT CONSIDERATIONS: None.      Janar Saray Ibarra PA-C  [id number] [place] [address]/Rossana Ang MD        "  The sleep study that you ordered is complete.  You have ordered sleep LAB services to perform the sleep study for Casie Gaines .      Please find Sleep Study result in  the "Media tab" of Chart Review menu.        You can look  for the report in the  Media by the document type "Sleep Study Documents". Alphabetizing  "Document type" column helps to find the SLEEP STUDY report  Faster.       As the ordering provider, you are responsible for reviewing the results and implementing a treatment plan with your patient.    If you need a Sleep Medicine provider to explain the sleep study findings and arrange treatment for the patient, please refer patient for consultation to our Sleep Clinic via [facility] with Ambulatory Consult Sleep.     To do that please place an order for an  "Ambulatory Consult Sleep" -  order , it will go to our clinic work queue for our staff  to contact the patient for an appointment.      For any questions, please contact our sleep lab  staff at [phone] to talk to clinical staff          Bandar Bee MD   "

## 2024-05-14 ENCOUNTER — CLINICAL SUPPORT (OUTPATIENT)
Dept: REHABILITATION | Facility: HOSPITAL | Age: 69
End: 2024-05-14
Payer: MEDICARE

## 2024-05-14 DIAGNOSIS — M62.81 PROXIMAL MUSCLE WEAKNESS: ICD-10-CM

## 2024-05-14 DIAGNOSIS — Z74.09 DECREASED FUNCTIONAL MOBILITY AND ENDURANCE: Primary | ICD-10-CM

## 2024-05-14 DIAGNOSIS — M53.86 DECREASED RANGE OF MOTION OF LUMBAR SPINE: ICD-10-CM

## 2024-05-14 PROCEDURE — 97110 THERAPEUTIC EXERCISES: CPT | Mod: PN

## 2024-05-14 PROCEDURE — 97112 NEUROMUSCULAR REEDUCATION: CPT | Mod: PN

## 2024-05-14 NOTE — PROGRESS NOTES
[facility]   Physical Therapy Treatment Note      Name: Casie Frias [facility]  Clinic Number: [id number]    Therapy Diagnosis:   No diagnosis found.      Physician: Rossana Ang MD    Visit Date: 5/14/2024    Physician Orders: PT Eval and Treat  Medical Diagnosis from Referral: Degenerative disc disease, lumbar [M51.36]   Evaluation Date: 3/26/2024  Authorization Period Expiration: 3/25/2025  Plan of Care Expiration: 5/26/2024  Progress Note Due: 4/26/2024  Visit # / Visits authorized: 1/1  5 /10  FOTO: 1/3 (last performed on 3/26/2024)     Precautions: Standard and CHF     Time In: 0945  Time Out: 1040  Total Billable Time (timed & untimed codes): 55 minutes    PTA Visit #: 0/5       Subjective     Patient reports: low back pain.  She was compliant with home exercise program.  Response to previous treatment: N/A  Functional change: N/A    Pain: 5/10  Location:  lumbar region, R>L     Objective      Objective Measures updated at progress report unless specified.     Treatment     Casie received the treatments listed below:        THERAPEUTIC EXERCISES to develop strength, ROM, and core stabilization for 25 minutes including :    Exercises   Details     NuStep   x  5 min for repetitive hip motion and postural cues     Seated ball roll outs x 1minute     Prone hip extension  x 2x10     Prone quad stretch  x 3x20 seconds     Clamshells  x 2x10 with red band                                    NEUROMUSCULAR RE-EDUCATION ACTIVITIES to improve Balance, Coordination, Kinesthetic, Proprioception, and Posture for 30 minutes.  The following were included:     Activity   Details     Matrix lumbar extension  x 60# x20     Matrix rotation x 26# x20     Posterior pelvic tilt with cues x 3x10 with 4 second hold     Supine DKTC with ball  x 2x15      Lower trunk rotation  x 2x15      Shuttle  x 5 bands    Sit to stand  x 2x 10            THERAPEUTIC ACTIVITIES to improve dynamic and functional  performance for --- minutes including:    Activity   Details     Pushing sled                   MANUAL THERAPY TECHNIQUES including Joint mobilizations, Manual traction, and Soft tissue Mobilization were applied to the low back for 00 minutes:    Exercises   Details     Manual lumbar traction                    Supervised modalities after being cleared for contradictions: IFC Electrical Stimulation:  Casie received IFC Electrical Stimulation for pain control applied to the ---. Pt received stimulation for --- minutes. Casie tolderated treatment well without any adverse effects.          Patient Education and Home Exercises       Education provided:   - Home program    Written Home Exercises Provided: Patient instructed to cont prior HEP. Exercises were reviewed and Casie was able to demonstrate them prior to the end of the session.  Casie demonstrated good  understanding of the education provided. See Electronic Medical Record under Patient Instructions for exercises provided during therapy sessions    Assessment     The patient has low back pain and complains of weakness.  She was instructed in core and lower extremity strengthening.    Casie Is progressing well towards her goals.   Patient prognosis is Good.     Patient will continue to benefit from skilled outpatient physical therapy to address the deficits listed in the problem list box on initial evaluation, provide pt/family education and to maximize pt's level of independence in the home and community environment.     Patient's spiritual, cultural and educational needs considered and pt agreeable to plan of care and goals.     Anticipated barriers to physical therapy: None    Short Term Goals:  4 weeks Status  Date Met   PAIN: Pt will report worst pain of 6/10 in order to progress toward max functional ability and improve quality of life. [x] Progressing  [] Met  [] Not Met     FUNCTION: Patient will demonstrate improved function as indicated by a score of  greater than or equal to 54 out of 100 on FOTO. [x] Progressing  [] Met  [] Not Met     MOBILITY: Patient will improve AROM to 50% of stated goals, listed in objective measures above, in order to progress towards independence with functional activities.  [x] Progressing  [] Met  [] Not Met     STRENGTH: Patient will improve strength to 50% of stated goals, listed in objective measures above, in order to progress towards independence with functional activities. [x] Progressing  [] Met  [] Not Met     POSTURE: Patient will correct postural deviations in sitting and standing, to decrease pain and promote long term stability.  [x] Progressing  [] Met  [] Not Met     HEP: Patient will demonstrate independence with HEP in order to progress toward functional independence. [x] Progressing  [] Met  [] Not Met        Long Term Goals:  8 weeks Status Date Met   PAIN: Pt will report worst pain of 2/10 in order to progress toward max functional ability and improve quality of life [x] Progressing  [] Met  [] Not Met     FUNCTION: Patient will demonstrate improved function as indicated by a score of greater than or equal to 66 out of 100 on FOTO. [x] Progressing  [] Met  [] Not Met     MOBILITY: Patient will improve AROM to stated goals, listed in objective measures above, in order to return to maximal functional potential and improve quality of life.  [x] Progressing  [] Met  [] Not Met     STRENGTH: Patient will improve strength to stated goals, listed in objective measures above, in order to improve functional independence and quality of life.  [x] Progressing  [] Met  [] Not Met     Patient will return to normal ADL's, IADL's, community involvement, recreational activities, and work-related activities with less than or equal to 2/10 pain and maximal function.  [x] Progressing  [] Met  [] Not Met        Plan      Plan of care Certification: 3/26/2024 to 5/26/2024.    Andres Martines, PT

## 2024-05-18 ENCOUNTER — HOSPITAL ENCOUNTER (INPATIENT)
Facility: HOSPITAL | Age: 69
LOS: 1 days | Discharge: HOME | DRG: 194 | End: 2024-05-20
Attending: EMERGENCY MEDICINE | Admitting: EMERGENCY MEDICINE
Payer: MEDICARE

## 2024-05-18 DIAGNOSIS — R07.9 CHEST PAIN: ICD-10-CM

## 2024-05-18 DIAGNOSIS — I10 PRIMARY HYPERTENSION: ICD-10-CM

## 2024-05-18 DIAGNOSIS — G62.9 NEUROPATHY: ICD-10-CM

## 2024-05-18 DIAGNOSIS — J40 BRONCHITIS: ICD-10-CM

## 2024-05-18 DIAGNOSIS — R05.1 ACUTE COUGH: ICD-10-CM

## 2024-05-18 DIAGNOSIS — R06.02 SHORTNESS OF BREATH: ICD-10-CM

## 2024-05-18 DIAGNOSIS — J18.9 PNEUMONIA OF RIGHT LOWER LOBE DUE TO INFECTIOUS ORGANISM: Primary | ICD-10-CM

## 2024-05-18 DIAGNOSIS — R79.89 ELEVATED TROPONIN: ICD-10-CM

## 2024-05-18 PROBLEM — R60.0 LOWER EXTREMITY EDEMA: Status: ACTIVE | Noted: 2024-05-18

## 2024-05-18 LAB
ALBUMIN SERPL BCP-MCNC: 3.5 G/DL (ref 3.5–5.2)
ALP SERPL-CCNC: 108 U/L (ref 55–135)
ALT SERPL W/O P-5'-P-CCNC: 56 U/L (ref 10–44)
ANION GAP SERPL CALC-SCNC: 10 MMOL/L (ref 8–16)
AST SERPL-CCNC: 52 U/L (ref 10–40)
BASOPHILS # BLD AUTO: 0.02 K/UL (ref 0–0.2)
BASOPHILS NFR BLD: 0.4 % (ref 0–1.9)
BILIRUB SERPL-MCNC: 0.6 MG/DL (ref 0.1–1)
BILIRUB UR QL STRIP: NEGATIVE
BNP SERPL-MCNC: 21 PG/ML (ref 0–99)
BUN SERPL-MCNC: 10 MG/DL (ref 8–23)
CALCIUM SERPL-MCNC: 9.2 MG/DL (ref 8.7–10.5)
CHLORIDE SERPL-SCNC: 108 MMOL/L (ref 95–110)
CLARITY UR: CLEAR
CO2 SERPL-SCNC: 24 MMOL/L (ref 23–29)
COLOR UR: YELLOW
CREAT SERPL-MCNC: 0.8 MG/DL (ref 0.5–1.4)
DIFFERENTIAL METHOD BLD: ABNORMAL
EOSINOPHIL # BLD AUTO: 0.1 K/UL (ref 0–0.5)
EOSINOPHIL NFR BLD: 1.2 % (ref 0–8)
ERYTHROCYTE [DISTWIDTH] IN BLOOD BY AUTOMATED COUNT: 14.9 % (ref 11.5–14.5)
EST. GFR  (NO RACE VARIABLE): >60 ML/MIN/1.73 M^2
GLUCOSE SERPL-MCNC: 100 MG/DL (ref 70–110)
GLUCOSE UR QL STRIP: NEGATIVE
HCT VFR BLD AUTO: 34.4 % (ref 37–48.5)
HGB BLD-MCNC: 11 G/DL (ref 12–16)
HGB UR QL STRIP: NEGATIVE
IMM GRANULOCYTES # BLD AUTO: 0.01 K/UL (ref 0–0.04)
IMM GRANULOCYTES NFR BLD AUTO: 0.2 % (ref 0–0.5)
INFLUENZA A, MOLECULAR: NEGATIVE
INFLUENZA B, MOLECULAR: NEGATIVE
KETONES UR QL STRIP: NEGATIVE
LACTATE SERPL-SCNC: 1 MMOL/L (ref 0.5–2.2)
LACTATE SERPL-SCNC: 1.2 MMOL/L (ref 0.5–2.2)
LEUKOCYTE ESTERASE UR QL STRIP: NEGATIVE
LYMPHOCYTES # BLD AUTO: 1 K/UL (ref 1–4.8)
LYMPHOCYTES NFR BLD: 20 % (ref 18–48)
MCH RBC QN AUTO: 29 PG (ref 27–31)
MCHC RBC AUTO-ENTMCNC: 32 G/DL (ref 32–36)
MCV RBC AUTO: 91 FL (ref 82–98)
MONOCYTES # BLD AUTO: 0.4 K/UL (ref 0.3–1)
MONOCYTES NFR BLD: 8.1 % (ref 4–15)
NEUTROPHILS # BLD AUTO: 3.6 K/UL (ref 1.8–7.7)
NEUTROPHILS NFR BLD: 70.1 % (ref 38–73)
NITRITE UR QL STRIP: NEGATIVE
NRBC BLD-RTO: 0 /100 WBC
PH UR STRIP: 7 [PH] (ref 5–8)
PLATELET # BLD AUTO: 195 K/UL (ref 150–450)
PMV BLD AUTO: 9.5 FL (ref 9.2–12.9)
POTASSIUM SERPL-SCNC: 3.6 MMOL/L (ref 3.5–5.1)
PROCALCITONIN SERPL IA-MCNC: 0.4 NG/ML
PROT SERPL-MCNC: 6.6 G/DL (ref 6–8.4)
PROT UR QL STRIP: ABNORMAL
RBC # BLD AUTO: 3.79 M/UL (ref 4–5.4)
SARS-COV-2 RDRP RESP QL NAA+PROBE: NEGATIVE
SODIUM SERPL-SCNC: 142 MMOL/L (ref 136–145)
SP GR UR STRIP: 1.02 (ref 1–1.03)
SPECIMEN SOURCE: NORMAL
TROPONIN I SERPL DL<=0.01 NG/ML-MCNC: 0.02 NG/ML (ref 0–0.03)
TROPONIN I SERPL DL<=0.01 NG/ML-MCNC: 0.03 NG/ML (ref 0–0.03)
TROPONIN I SERPL DL<=0.01 NG/ML-MCNC: 0.08 NG/ML (ref 0–0.03)
URN SPEC COLLECT METH UR: ABNORMAL
UROBILINOGEN UR STRIP-ACNC: ABNORMAL EU/DL
WBC # BLD AUTO: 5.09 K/UL (ref 3.9–12.7)

## 2024-05-18 PROCEDURE — 84484 ASSAY OF TROPONIN QUANT: CPT | Performed by: EMERGENCY MEDICINE

## 2024-05-18 PROCEDURE — 25000003 PHARM REV CODE 250: Performed by: NURSE PRACTITIONER

## 2024-05-18 PROCEDURE — 25000242 PHARM REV CODE 250 ALT 637 W/ HCPCS: Performed by: NURSE PRACTITIONER

## 2024-05-18 PROCEDURE — 93010 ELECTROCARDIOGRAM REPORT: CPT | Mod: ,,, | Performed by: INTERNAL MEDICINE

## 2024-05-18 PROCEDURE — 94640 AIRWAY INHALATION TREATMENT: CPT

## 2024-05-18 PROCEDURE — 84484 ASSAY OF TROPONIN QUANT: CPT | Mod: 91 | Performed by: NURSE PRACTITIONER

## 2024-05-18 PROCEDURE — 63600175 PHARM REV CODE 636 W HCPCS: Performed by: EMERGENCY MEDICINE

## 2024-05-18 PROCEDURE — G0378 HOSPITAL OBSERVATION PER HR: HCPCS

## 2024-05-18 PROCEDURE — 36415 COLL VENOUS BLD VENIPUNCTURE: CPT | Performed by: NURSE PRACTITIONER

## 2024-05-18 PROCEDURE — 83880 ASSAY OF NATRIURETIC PEPTIDE: CPT | Performed by: EMERGENCY MEDICINE

## 2024-05-18 PROCEDURE — 94640 AIRWAY INHALATION TREATMENT: CPT | Mod: XB

## 2024-05-18 PROCEDURE — 83605 ASSAY OF LACTIC ACID: CPT | Mod: 91 | Performed by: EMERGENCY MEDICINE

## 2024-05-18 PROCEDURE — 94761 N-INVAS EAR/PLS OXIMETRY MLT: CPT

## 2024-05-18 PROCEDURE — 96366 THER/PROPH/DIAG IV INF ADDON: CPT

## 2024-05-18 PROCEDURE — U0002 COVID-19 LAB TEST NON-CDC: HCPCS | Performed by: EMERGENCY MEDICINE

## 2024-05-18 PROCEDURE — 96365 THER/PROPH/DIAG IV INF INIT: CPT

## 2024-05-18 PROCEDURE — 80053 COMPREHEN METABOLIC PANEL: CPT | Performed by: EMERGENCY MEDICINE

## 2024-05-18 PROCEDURE — 87205 SMEAR GRAM STAIN: CPT | Performed by: NURSE PRACTITIONER

## 2024-05-18 PROCEDURE — 87070 CULTURE OTHR SPECIMN AEROBIC: CPT | Performed by: NURSE PRACTITIONER

## 2024-05-18 PROCEDURE — 87040 BLOOD CULTURE FOR BACTERIA: CPT | Performed by: EMERGENCY MEDICINE

## 2024-05-18 PROCEDURE — 85025 COMPLETE CBC W/AUTO DIFF WBC: CPT | Performed by: EMERGENCY MEDICINE

## 2024-05-18 PROCEDURE — 96375 TX/PRO/DX INJ NEW DRUG ADDON: CPT

## 2024-05-18 PROCEDURE — 93005 ELECTROCARDIOGRAM TRACING: CPT

## 2024-05-18 PROCEDURE — 81003 URINALYSIS AUTO W/O SCOPE: CPT | Performed by: EMERGENCY MEDICINE

## 2024-05-18 PROCEDURE — 96372 THER/PROPH/DIAG INJ SC/IM: CPT | Performed by: NURSE PRACTITIONER

## 2024-05-18 PROCEDURE — 63600175 PHARM REV CODE 636 W HCPCS: Performed by: NURSE PRACTITIONER

## 2024-05-18 PROCEDURE — 99285 EMERGENCY DEPT VISIT HI MDM: CPT | Mod: 25

## 2024-05-18 PROCEDURE — 87502 INFLUENZA DNA AMP PROBE: CPT | Performed by: EMERGENCY MEDICINE

## 2024-05-18 PROCEDURE — 84145 PROCALCITONIN (PCT): CPT | Performed by: EMERGENCY MEDICINE

## 2024-05-18 PROCEDURE — 27000221 HC OXYGEN, UP TO 24 HOURS

## 2024-05-18 PROCEDURE — 25500020 PHARM REV CODE 255: Performed by: EMERGENCY MEDICINE

## 2024-05-18 RX ORDER — ENOXAPARIN SODIUM 100 MG/ML
40 INJECTION SUBCUTANEOUS 2 TIMES DAILY
Status: DISCONTINUED | OUTPATIENT
Start: 2024-05-18 | End: 2024-05-20 | Stop reason: HOSPADM

## 2024-05-18 RX ORDER — IBUPROFEN 200 MG
16 TABLET ORAL
Status: DISCONTINUED | OUTPATIENT
Start: 2024-05-18 | End: 2024-05-20 | Stop reason: HOSPADM

## 2024-05-18 RX ORDER — ATENOLOL 25 MG/1
25 TABLET ORAL DAILY
Status: DISCONTINUED | OUTPATIENT
Start: 2024-05-18 | End: 2024-05-20 | Stop reason: HOSPADM

## 2024-05-18 RX ORDER — ONDANSETRON HYDROCHLORIDE 2 MG/ML
4 INJECTION, SOLUTION INTRAVENOUS EVERY 8 HOURS PRN
Status: DISCONTINUED | OUTPATIENT
Start: 2024-05-18 | End: 2024-05-20 | Stop reason: HOSPADM

## 2024-05-18 RX ORDER — NALOXONE HCL 0.4 MG/ML
0.02 VIAL (ML) INJECTION
Status: DISCONTINUED | OUTPATIENT
Start: 2024-05-18 | End: 2024-05-20 | Stop reason: HOSPADM

## 2024-05-18 RX ORDER — LEVOFLOXACIN 5 MG/ML
750 INJECTION, SOLUTION INTRAVENOUS
Status: COMPLETED | OUTPATIENT
Start: 2024-05-18 | End: 2024-05-18

## 2024-05-18 RX ORDER — POLYETHYLENE GLYCOL 3350 17 G/17G
17 POWDER, FOR SOLUTION ORAL DAILY
Status: DISCONTINUED | OUTPATIENT
Start: 2024-05-18 | End: 2024-05-20 | Stop reason: HOSPADM

## 2024-05-18 RX ORDER — ACETAMINOPHEN 325 MG/1
650 TABLET ORAL EVERY 4 HOURS PRN
Status: DISCONTINUED | OUTPATIENT
Start: 2024-05-18 | End: 2024-05-20 | Stop reason: HOSPADM

## 2024-05-18 RX ORDER — LEVOFLOXACIN 5 MG/ML
750 INJECTION, SOLUTION INTRAVENOUS
Status: DISCONTINUED | OUTPATIENT
Start: 2024-05-19 | End: 2024-05-20 | Stop reason: HOSPADM

## 2024-05-18 RX ORDER — IBUPROFEN 200 MG
24 TABLET ORAL
Status: DISCONTINUED | OUTPATIENT
Start: 2024-05-18 | End: 2024-05-20 | Stop reason: HOSPADM

## 2024-05-18 RX ORDER — TALC
6 POWDER (GRAM) TOPICAL NIGHTLY PRN
Status: DISCONTINUED | OUTPATIENT
Start: 2024-05-18 | End: 2024-05-20 | Stop reason: HOSPADM

## 2024-05-18 RX ORDER — ONDANSETRON HYDROCHLORIDE 2 MG/ML
4 INJECTION, SOLUTION INTRAVENOUS
Status: COMPLETED | OUTPATIENT
Start: 2024-05-18 | End: 2024-05-18

## 2024-05-18 RX ORDER — ALPRAZOLAM 0.25 MG/1
0.25 TABLET ORAL 3 TIMES DAILY PRN
Status: DISCONTINUED | OUTPATIENT
Start: 2024-05-18 | End: 2024-05-20 | Stop reason: HOSPADM

## 2024-05-18 RX ORDER — BENZONATATE 100 MG/1
100 CAPSULE ORAL 3 TIMES DAILY PRN
Status: DISCONTINUED | OUTPATIENT
Start: 2024-05-18 | End: 2024-05-20 | Stop reason: HOSPADM

## 2024-05-18 RX ORDER — GABAPENTIN 300 MG/1
600 CAPSULE ORAL 3 TIMES DAILY
Status: DISCONTINUED | OUTPATIENT
Start: 2024-05-18 | End: 2024-05-20 | Stop reason: HOSPADM

## 2024-05-18 RX ORDER — IPRATROPIUM BROMIDE AND ALBUTEROL SULFATE 2.5; .5 MG/3ML; MG/3ML
3 SOLUTION RESPIRATORY (INHALATION) ONCE
Status: COMPLETED | OUTPATIENT
Start: 2024-05-18 | End: 2024-05-18

## 2024-05-18 RX ORDER — HYDROCODONE BITARTRATE AND ACETAMINOPHEN 5; 325 MG/1; MG/1
1 TABLET ORAL EVERY 6 HOURS PRN
Status: DISCONTINUED | OUTPATIENT
Start: 2024-05-18 | End: 2024-05-20 | Stop reason: HOSPADM

## 2024-05-18 RX ORDER — ALUMINUM HYDROXIDE, MAGNESIUM HYDROXIDE, AND SIMETHICONE 1200; 120; 1200 MG/30ML; MG/30ML; MG/30ML
30 SUSPENSION ORAL 4 TIMES DAILY PRN
Status: DISCONTINUED | OUTPATIENT
Start: 2024-05-18 | End: 2024-05-20 | Stop reason: HOSPADM

## 2024-05-18 RX ORDER — SODIUM CHLORIDE 0.9 % (FLUSH) 0.9 %
10 SYRINGE (ML) INJECTION EVERY 12 HOURS PRN
Status: DISCONTINUED | OUTPATIENT
Start: 2024-05-18 | End: 2024-05-20 | Stop reason: HOSPADM

## 2024-05-18 RX ORDER — IPRATROPIUM BROMIDE AND ALBUTEROL SULFATE 2.5; .5 MG/3ML; MG/3ML
3 SOLUTION RESPIRATORY (INHALATION)
Status: DISCONTINUED | OUTPATIENT
Start: 2024-05-18 | End: 2024-05-20 | Stop reason: HOSPADM

## 2024-05-18 RX ORDER — ATORVASTATIN CALCIUM 40 MG/1
40 TABLET, FILM COATED ORAL DAILY
Status: DISCONTINUED | OUTPATIENT
Start: 2024-05-18 | End: 2024-05-20 | Stop reason: HOSPADM

## 2024-05-18 RX ORDER — GLUCAGON 1 MG
1 KIT INJECTION
Status: DISCONTINUED | OUTPATIENT
Start: 2024-05-18 | End: 2024-05-20 | Stop reason: HOSPADM

## 2024-05-18 RX ORDER — SERTRALINE HYDROCHLORIDE 25 MG/1
25 TABLET, FILM COATED ORAL DAILY
Status: DISCONTINUED | OUTPATIENT
Start: 2024-05-18 | End: 2024-05-20 | Stop reason: HOSPADM

## 2024-05-18 RX ORDER — AMLODIPINE BESYLATE 10 MG/1
10 TABLET ORAL DAILY
Status: DISCONTINUED | OUTPATIENT
Start: 2024-05-19 | End: 2024-05-20 | Stop reason: HOSPADM

## 2024-05-18 RX ORDER — PROCHLORPERAZINE EDISYLATE 5 MG/ML
5 INJECTION INTRAMUSCULAR; INTRAVENOUS EVERY 6 HOURS PRN
Status: DISCONTINUED | OUTPATIENT
Start: 2024-05-18 | End: 2024-05-20 | Stop reason: HOSPADM

## 2024-05-18 RX ADMIN — IPRATROPIUM BROMIDE AND ALBUTEROL SULFATE 3 ML: 2.5; .5 SOLUTION RESPIRATORY (INHALATION) at 07:05

## 2024-05-18 RX ADMIN — LEVOFLOXACIN 750 MG: 750 INJECTION, SOLUTION INTRAVENOUS at 02:05

## 2024-05-18 RX ADMIN — ATENOLOL 25 MG: 25 TABLET ORAL at 04:05

## 2024-05-18 RX ADMIN — ATORVASTATIN CALCIUM 40 MG: 40 TABLET, FILM COATED ORAL at 04:05

## 2024-05-18 RX ADMIN — SERTRALINE HYDROCHLORIDE 25 MG: 25 TABLET ORAL at 04:05

## 2024-05-18 RX ADMIN — ONDANSETRON 4 MG: 2 INJECTION INTRAMUSCULAR; INTRAVENOUS at 01:05

## 2024-05-18 RX ADMIN — GABAPENTIN 600 MG: 300 CAPSULE ORAL at 09:05

## 2024-05-18 RX ADMIN — IPRATROPIUM BROMIDE AND ALBUTEROL SULFATE 3 ML: 2.5; .5 SOLUTION RESPIRATORY (INHALATION) at 02:05

## 2024-05-18 RX ADMIN — IPRATROPIUM BROMIDE AND ALBUTEROL SULFATE 3 ML: 2.5; .5 SOLUTION RESPIRATORY (INHALATION) at 05:05

## 2024-05-18 RX ADMIN — ENOXAPARIN SODIUM 40 MG: 40 INJECTION SUBCUTANEOUS at 09:05

## 2024-05-18 RX ADMIN — GABAPENTIN 600 MG: 300 CAPSULE ORAL at 04:05

## 2024-05-18 RX ADMIN — IOHEXOL 100 ML: 350 INJECTION, SOLUTION INTRAVENOUS at 12:05

## 2024-05-18 NOTE — ASSESSMENT & PLAN NOTE
Likely secondary to demand ischemia related to pneumonia, dyspnea.  Will rule out ACS.     --trend troponin  --tele  --EKG p.r.n. chest pain/rhythm changes

## 2024-05-18 NOTE — ED NOTES
Pt. Request to put ice pack on infusaport access site prior to accessing it. Ice pack applied. Will draw labs peripherally except the one blood culture from the port site while waiting for the ice to take effect.

## 2024-05-18 NOTE — ED PROVIDER NOTES
SCRIBE #1 NOTE: I, Amalia Edil, am scribing for, and in the presence of, Pérez Velasco MD. I have scribed the entire note.       History     Chief Complaint   Patient presents with    Cough     Cough and fever x 3 days. Exertional shortness of breath.      Review of patient's allergies indicates:  No Known Allergies      History of Present Illness     HPI    2024, 9:30 AM  History obtained from the patient      History of Present Illness: Casie Gaines is a 68 y.o. female patient with a PMHx of HTN, CHF, anemia, anxiety, antineoplastic chemotherapy, and ovarian cancer who presents to the Emergency Department for evaluation of a cough which onset gradually 3 days ago. Pt reports ending chemotherapy 1 month ago. Symptoms are constant and moderate in severity. No mitigating or exacerbating factors reported. Associated sxs include SOB, fever, and diarrhea. Patient denies any vomiting, CP, productive cough, HA, dysuria, back pain, weakness, and all other sxs at this time. Prior Tx includes none. No further complaints or concerns at this time.       Arrival mode: Personal vehicle    PCP: Rossana Ang MD        Past Medical History:  Past Medical History:   Diagnosis Date    Anemia     Anxiety     Arthritis     knees    Cancer     ovarian    CHF (congestive heart failure)     Hx antineoplastic chemotherapy     last 2019    Hyperlipemia     Hypertension     Neck pain     Ovarian cancer 2019    CHEMO    Peritoneal carcinomatosis 2019       Past Surgical History:  Past Surgical History:   Procedure Laterality Date    breast reduction  10/02/2018    CATARACT EXTRACTION Bilateral     2023     SECTION      X 1    COLONOSCOPY N/A 2021    Procedure: COLONOSCOPY;  Surgeon: Paulette Rojas MD;  Location: [facility];  Service: Gastroenterology;  Laterality: N/A;    CYSTOSCOPY W/ URETERAL STENT PLACEMENT Right 2019    Procedure: CYSTOSCOPY, WITH URETERAL STENT INSERTION;   Surgeon: Timmy Santiago IV, MD;  Location: [facility] OR;  Service: Urology;  Laterality: Right;    CYSTOSCOPY W/ URETERAL STENT REMOVAL  10/04/2019    DILATION AND CURETTAGE OF UTERUS      HYSTERECTOMY      RALH for fibroids (still has ovaries)    INSERTION OF VENOUS ACCESS PORT Left 2019    Procedure: INSERTION, VENOUS ACCESS PORT;  Surgeon: Ulisses Monzon MD;  Location: [facility] OR;  Service: General;  Laterality: Left;  Left internal jugular     LYSIS OF ADHESIONS OF URETER N/A 08/15/2019    Procedure: URETEROLYSIS;  Surgeon: Ismael Juarez MD;  Location: [facility] OR;  Service: OB/GYN;  Laterality: N/A;    OMENTECTOMY N/A 08/15/2019    Procedure: OMENTECTOMY;  Surgeon: Ismael Juarez MD;  Location: [facility];  Service: OB/GYN;  Laterality: N/A;    RETROGRADE PYELOGRAPHY Right 2019    Procedure: PYELOGRAM, RETROGRADE;  Surgeon: Timmy Santiago IV, MD;  Location: [facility] OR;  Service: Urology;  Laterality: Right;    ROBOT-ASSISTED LAPAROSCOPIC SALPINGO-OOPHORECTOMY USING DA TIA XI Bilateral 08/15/2019    Procedure: XI ROBOTIC SALPINGO-OOPHORECTOMY;  Surgeon: Ismael Juarez MD;  Location: [facility];  Service: OB/GYN;  Laterality: Bilateral;    TOTAL REDUCTION MAMMOPLASTY  2018    TUBAL LIGATION           Family History:  Family History   Problem Relation Name Age of Onset    Glaucoma Mother      No Known Problems Father      Colon cancer Brother      Breast cancer Maternal Aunt      Breast cancer Paternal Aunt      Ovarian cancer Paternal Aunt      Anesthesia problems Other cousin     Thrombophilia Neg Hx         Social History:  Social History     Tobacco Use    Smoking status: Former     Current packs/day: 0.00     Average packs/day: 1 pack/day for 25.0 years (25.0 ttl pk-yrs)     Types: Cigarettes     Start date: 10/1/1993     Quit date: 10/1/2018     Years since quittin.6    Smokeless tobacco: Never    Tobacco comments:     States started quit 2 months ago after 30 years   Substance and Sexual Activity     Alcohol use: Yes     Comment: occasionally  No alcohol 72h prior to sx    Drug use: No    Sexual activity: Not Currently     Partners: Male     Comment: hyst; mut monog        Review of Systems     Review of Systems   Constitutional:  Positive for fever.   HENT:  Negative for sore throat.    Respiratory:  Positive for cough and shortness of breath.    Cardiovascular:  Negative for chest pain.   Gastrointestinal:  Positive for diarrhea. Negative for nausea and vomiting.   Genitourinary:  Negative for dysuria.   Musculoskeletal:  Negative for back pain.   Skin:  Negative for rash.   Neurological:  Negative for weakness and headaches.   Hematological:  Does not bruise/bleed easily.   All other systems reviewed and are negative.       Physical Exam     Initial Vitals [05/18/24 0910]   BP Pulse Resp Temp SpO2   138/66 88 (!) 24 98.4 °F (36.9 °C) 95 %      MAP       --          Physical Exam  Nursing Notes and Vital Signs Reviewed.  Constitutional: Patient is in no acute distress. Well-developed and well-nourished.  Head: Atraumatic. Normocephalic.  Eyes: PERRL. EOM intact. Conjunctivae are not pale. No scleral icterus.  ENT: Mucous membranes are moist. Oropharynx is clear and symmetric.    Neck: Supple. Full ROM. No lymphadenopathy.  Cardiovascular: Regular rate. Regular rhythm. No murmurs, rubs, or gallops. Distal pulses are 2+ and symmetric.  Pulmonary/Chest: Coarse breath sounds bilaterally.   Abdominal: Soft and non-distended.  There is no tenderness.  No rebound, guarding, or rigidity.  Genitourinary: No CVA tenderness  Musculoskeletal: Moves all extremities. No obvious deformities. No edema.  Skin: Warm and dry.  Neurological:  Alert, awake, and appropriate.  Normal speech.  No acute focal neurological deficits are appreciated.  Psychiatric: Normal affect. Good eye contact. Appropriate in content.     ED Course   Procedures  ED Vital Signs:  Vitals:    05/18/24 0910 05/18/24 1139 05/18/24 1143 05/18/24 1404   BP:  138/66  135/64    Pulse: 88 80 82 91   Resp: (!) 24  20 19   Temp: 98.4 °F (36.9 °C)      TempSrc: Oral      SpO2: 95%  98% 96%   Weight: 115.9 kg (255 lb 8.2 oz)          Abnormal Lab Results:  Labs Reviewed   CBC W/ AUTO DIFFERENTIAL - Abnormal; Notable for the following components:       Result Value    RBC 3.79 (*)     Hemoglobin 11.0 (*)     Hematocrit 34.4 (*)     RDW 14.9 (*)     All other components within normal limits   COMPREHENSIVE METABOLIC PANEL - Abnormal; Notable for the following components:    AST 52 (*)     ALT 56 (*)     All other components within normal limits   URINALYSIS, REFLEX TO URINE CULTURE - Abnormal; Notable for the following components:    Protein, UA Trace (*)     Urobilinogen, UA 4.0-6.0 (*)     All other components within normal limits    Narrative:     Specimen Source->Urine   TROPONIN I - Abnormal; Notable for the following components:    Troponin I 0.082 (*)     All other components within normal limits   PROCALCITONIN - Abnormal; Notable for the following components:    Procalcitonin 0.40 (*)     All other components within normal limits   INFLUENZA A & B BY MOLECULAR   CULTURE, BLOOD   CULTURE, BLOOD   LACTIC ACID, PLASMA   B-TYPE NATRIURETIC PEPTIDE   SARS-COV-2 RNA AMPLIFICATION, QUAL   LACTIC ACID, PLASMA   TROPONIN I        All Lab Results:  Results for orders placed or performed during the hospital encounter of 05/18/24   Influenza A & B by Molecular    Specimen: Nasal Swab   Result Value Ref Range    Influenza A, Molecular Negative Negative    Influenza B, Molecular Negative Negative    Flu A & B Source Nasal swab    CBC auto differential   Result Value Ref Range    WBC 5.09 3.90 - 12.70 K/uL    RBC 3.79 (L) 4.00 - 5.40 M/uL    Hemoglobin 11.0 (L) 12.0 - 16.0 g/dL    Hematocrit 34.4 (L) 37.0 - 48.5 %    MCV 91 82 - 98 fL    MCH 29.0 27.0 - 31.0 pg    MCHC 32.0 32.0 - 36.0 g/dL    RDW 14.9 (H) 11.5 - 14.5 %    Platelets 195 150 - 450 K/uL    MPV 9.5 9.2 - 12.9 fL     Immature Granulocytes 0.2 0.0 - 0.5 %    Gran # (ANC) 3.6 1.8 - 7.7 K/uL    Immature Grans (Abs) 0.01 0.00 - 0.04 K/uL    Lymph # 1.0 1.0 - 4.8 K/uL    Mono # 0.4 0.3 - 1.0 K/uL    Eos # 0.1 0.0 - 0.5 K/uL    Baso # 0.02 0.00 - 0.20 K/uL    nRBC 0 0 /100 WBC    Gran % 70.1 38.0 - 73.0 %    Lymph % 20.0 18.0 - 48.0 %    Mono % 8.1 4.0 - 15.0 %    Eosinophil % 1.2 0.0 - 8.0 %    Basophil % 0.4 0.0 - 1.9 %    Differential Method Automated    Comprehensive metabolic panel   Result Value Ref Range    Sodium 142 136 - 145 mmol/L    Potassium 3.6 3.5 - 5.1 mmol/L    Chloride 108 95 - 110 mmol/L    CO2 24 23 - 29 mmol/L    Glucose 100 70 - 110 mg/dL    BUN 10 8 - 23 mg/dL    Creatinine 0.8 0.5 - 1.4 mg/dL    Calcium 9.2 8.7 - 10.5 mg/dL    Total Protein 6.6 6.0 - 8.4 g/dL    Albumin 3.5 3.5 - 5.2 g/dL    Total Bilirubin 0.6 0.1 - 1.0 mg/dL    Alkaline Phosphatase 108 55 - 135 U/L    AST 52 (H) 10 - 40 U/L    ALT 56 (H) 10 - 44 U/L    eGFR >60 >60 mL/min/1.73 m^2    Anion Gap 10 8 - 16 mmol/L   Lactic acid, plasma #1   Result Value Ref Range    Lactate (Lactic Acid) 1.0 0.5 - 2.2 mmol/L   Urinalysis, Reflex to Urine Culture Urine, Clean Catch    Specimen: Urine   Result Value Ref Range    Specimen UA Urine, Clean Catch     Color, UA Yellow Yellow, Straw, Lucero    Appearance, UA Clear Clear    pH, UA 7.0 5.0 - 8.0    Specific Gravity, UA 1.025 1.005 - 1.030    Protein, UA Trace (A) Negative    Glucose, UA Negative Negative    Ketones, UA Negative Negative    Bilirubin (UA) Negative Negative    Occult Blood UA Negative Negative    Nitrite, UA Negative Negative    Urobilinogen, UA 4.0-6.0 (A) <2.0 EU/dL    Leukocytes, UA Negative Negative   Troponin I   Result Value Ref Range    Troponin I 0.082 (H) 0.000 - 0.026 ng/mL   Procalcitonin   Result Value Ref Range    Procalcitonin 0.40 (H) <0.25 ng/mL   Brain natriuretic peptide   Result Value Ref Range    BNP 21 0 - 99 pg/mL   COVID-19 Rapid Screening   Result Value Ref Range     SARS-CoV-2 RNA, Amplification, Qual Negative Negative   Lactic acid, plasma #2   Result Value Ref Range    Lactate (Lactic Acid) 1.2 0.5 - 2.2 mmol/L   EKG 12-lead   Result Value Ref Range    QRS Duration 80 ms    [facility] QTC Calculation 455 ms     *Note: Due to a large number of results and/or encounters for the requested time period, some results have not been displayed. A complete set of results can be found in Results Review.         Imaging Results:  Imaging Results               CTA Chest Non-Coronary (PE Studies) (Final result)  Result time 05/18/24 13:15:41      Final result by Zay Scott MD (05/18/24 13:15:41)                   Impression:      Moderate size consolidative opacity in the right lung concerning for infection or aspiration.    No definite pulmonary embolus.  Complete findings as above.    This report was flagged in Epic as abnormal.    All CT scans at this facility are performed  using dose modulation techniques as appropriate to performed exam including the following:  automated exposure control; adjustment of mA and/or kV according to the patients size (this includes techniques or standardized protocols for targeted exams where dose is matched to indication/reason for exam: i.e. extremities or head);  iterative reconstruction technique.      Electronically signed by: Zay Scott  Date:    05/18/2024  Time:    13:15               Narrative:    EXAMINATION:  CTA CHEST NON CORONARY (PE STUDIES)    CLINICAL HISTORY:  PE suspected, intermediate prob, neg D-dimer;    TECHNIQUE:  Low dose axial images, sagittal and coronal reformations were obtained from the thoracic inlet to the lung bases following the IV administration of 100 mL of Omnipaque 350.  Contrast timing was optimized to evaluate the pulmonary arteries.  MIP images were performed.    COMPARISON:  None    FINDINGS:  Base of Neck: No significant abnormality.    Thoracic soft tissues: Unremarkable.    Aorta: Left-sided aortic arch.   No aneurysm and no significant atherosclerosis    Heart: Normal size. No effusion.    Pulmonary vasculature: Pulmonary arteries are well opacified.  There is no pulmonary thromboembolism.  Evaluation is adequate to the level of the segmental pulmonary arteries related to motion.    Nirmala/Mediastinum: No pathologic harinder enlargement.    Airways: Patent.    Lungs/Pleura: Moderate irregular consolidative opacity in the right lung predominantly in the right lower lobe concerning for infection or aspiration.    Esophagus: Unremarkable.    Upper Abdomen: No abnormality of the partially imaged upper abdomen.    Bones: No acute fracture. No suspicious lytic or sclerotic lesions.                                       X-Ray Chest AP Portable (Final result)  Result time 05/18/24 10:01:06      Final result by KING Robison Sr., MD (05/18/24 10:01:06)                   Impression:      There has been no clinically significant detrimental interval change in appearance.      Electronically signed by: Paul Robison MD  Date:    05/18/2024  Time:    10:01               Narrative:    EXAMINATION:  XR CHEST AP PORTABLE    CLINICAL HISTORY:  Sepsis;    COMPARISON:  02/24/2024    FINDINGS:  A left subclavian venous line remains in place.  The size of the heart is normal.  There is no focal pulmonary infiltrate visualized.  There is no pneumothorax.  The costophrenic angles are sharp.                                       The EKG was ordered, reviewed, and independently interpreted by the ED provider.  Interpretation time: 0922  Rate: 91 BPM  Rhythm: normal sinus rhythm  Interpretation: Nonspecific T wave abnormality. No STEMI.           The Emergency Provider reviewed the vital signs and test results, which are outlined above.     ED Discussion       1:05 PM: Discussed case with Dr. Romero ([facility]). Dr. Romero agrees with current care and management of pt and accepts admission.   Admitting Service: [facility]  Medicine  Admitting Physician: Dr. Romero  Admit to: Obs    1:05 PM: Re-evaluated pt. I have discussed test results, shared treatment plan, and the need for admission with patient and family at bedside. Pt and family express understanding at this time and agree with all information. All questions answered. Pt and family have no further questions or concerns at this time. Pt is ready for admit.         Medical Decision Making  DDx: pneumonia, CHF, NSTEMI, sob    Amount and/or Complexity of Data Reviewed  Labs: ordered. Decision-making details documented in ED Course.  Radiology: ordered. Decision-making details documented in ED Course.  ECG/medicine tests: ordered and independent interpretation performed. Decision-making details documented in ED Course.    Risk  Prescription drug management.                ED Medication(s):  Medications   levoFLOXacin 750 mg/150 mL IVPB 750 mg (750 mg Intravenous New Bag 5/18/24 1404)   amLODIPine tablet 10 mg (has no administration in time range)   atenoloL tablet 25 mg (has no administration in time range)   gabapentin capsule 600 mg (has no administration in time range)   atorvastatin tablet 40 mg (has no administration in time range)   sertraline tablet 25 mg (has no administration in time range)   ALPRAZolam tablet 0.25 mg (has no administration in time range)   sodium chloride 0.9% flush 10 mL (has no administration in time range)   melatonin tablet 6 mg (has no administration in time range)   ondansetron injection 4 mg (has no administration in time range)   prochlorperazine injection Soln 5 mg (has no administration in time range)   polyethylene glycol packet 17 g (has no administration in time range)   aluminum-magnesium hydroxide-simethicone 200-200-20 mg/5 mL suspension 30 mL (has no administration in time range)   acetaminophen tablet 650 mg (has no administration in time range)   naloxone 0.4 mg/mL injection 0.02 mg (has no administration in time range)   glucose  chewable tablet 16 g (has no administration in time range)   glucose chewable tablet 24 g (has no administration in time range)   glucagon (human recombinant) injection 1 mg (has no administration in time range)   enoxaparin injection 40 mg (has no administration in time range)   HYDROcodone-acetaminophen 5-325 mg per tablet 1 tablet (has no administration in time range)   dextrose 10% bolus 125 mL 125 mL (has no administration in time range)   dextrose 10% bolus 250 mL 250 mL (has no administration in time range)   iohexoL (OMNIPAQUE 350) injection 100 mL (100 mLs Intravenous Given 5/18/24 1242)   ondansetron injection 4 mg (4 mg Intravenous Given 5/18/24 1309)       New Prescriptions    No medications on file               Scribe Attestation:   Scribe #1: I performed the above scribed service and the documentation accurately describes the services I performed. I attest to the accuracy of the note.     Attending:   Physician Attestation Statement for Scribe #1: I, Pérez Velasco MD, personally performed the services described in this documentation, as scribed by Amalia Solo, in my presence, and it is both accurate and complete.           Clinical Impression       ICD-10-CM ICD-9-CM   1. Pneumonia of right lower lobe due to infectious organism  J18.9 486   2. Shortness of breath  R06.02 786.05   3. Chest pain  R07.9 786.50   4. Acute cough  R05.1 786.2   5. Elevated troponin  R79.89 790.6       Disposition:   Disposition: Placed in Observation  Condition: Serious         Pérez Velasco MD  05/18/24 8369

## 2024-05-18 NOTE — HPI
68 y.o. female patient with a PMHx of HTN, CHF, anemia, anxiety, MediPort, and ovarian cancer (last chemo 2/2024). Presented to the Emergency Department for evaluation of a cough which onset gradually beginning 3 days PTA.  Also complains of left-sided swelling/edema, reported swelling of left side of face neck left upper extremity left lower extremity.  On exam left lower extremity edematous from hip to toes, mild edema to left side of face left neck and left arm.  Symptoms are constant and moderate in severity. No mitigating or exacerbating factors reported. Associated sxs include SOB, fever (100.6), headache, lower abdominal pain and diarrhea. Patient denies any dysuria, back pain, weakness, and all other sxs at this time. Prior Tx includes Tylenol.  In the ED, vitals:  138/66, 88, 24, 98.4 F, 95% RA.  Patient noted to have desaturations to 90-91% with talking, dyspnea with conversation.  Significant labs: HGB: 11.0, AST:  52, ALT: 56, troponin:  0.082, procalcitonin: 0.40.  CTA chest:Moderate size consolidative opacity in the right lung concerning for infection.  CXR: No acute findings.  EKG:  Negative for STEMI.  Blood cultures collected.  Unable to collect a sample from [facility].  Initiated on IV antibiotics in the ED. patient is a full code.  Placed in observation under the care of Hospital Medicine for management of pneumonia.

## 2024-05-18 NOTE — PHARMACY MED REC
"Admission Medication History     The home medication history was taken by Markos Swan.    You may go to "Admission" then "Reconcile Home Medications" tabs to review and/or act upon these items.     The home medication list has been updated by the Pharmacy department.   Please read ALL comments highlighted in yellow.   Please address this information as you see fit.    Feel free to contact us if you have any questions or require assistance.      Medications listed below were obtained from: Patient/family and Analytic [employer]- [employer]  (Not in a hospital admission)        Markos Swan  [id number]    Current Outpatient Medications on File Prior to Encounter   Medication Sig Dispense Refill Last Dose    ALPRAZolam (XANAX) 0.25 MG tablet Take 1 tablet (0.25 mg total) by mouth 3 (three) times daily as needed for Anxiety. 60 tablet 2 5/17/2024    amLODIPine (NORVASC) 5 MG tablet Take 2 tablets (10 mg total) by mouth once daily. 180 tablet 3 5/17/2024    atenoloL (TENORMIN) 25 MG tablet Take 1 tablet (25 mg total) by mouth once daily. 90 tablet 2 5/17/2024    gabapentin (NEURONTIN) 300 MG capsule Take 1 capsule (300 mg total) by mouth 3 (three) times daily. (Patient taking differently: Take 600 mg by mouth 3 (three) times daily.) 90 capsule 3 5/17/2024    olmesartan-hydrochlorothiazide (BENICAR HCT) 40-25 mg per tablet Take 1 tablet by mouth once daily. 90 tablet 1 5/17/2024    rosuvastatin (CRESTOR) 10 MG tablet Take 1 tablet (10 mg total) by mouth once daily. 90 tablet 1 5/17/2024    sertraline (ZOLOFT) 25 MG tablet Take 1 tablet (25 mg total) by mouth once daily. 90 tablet 0 Past Week    albuterol 90 mcg/actuation inhaler Inhale 2 puffs into the lungs every 4 (four) hours as needed for Wheezing. Rescue 18 g 0     azelastine (ASTELIN) 137 mcg (0.1 %) nasal spray 1 spray (137 mcg total) by Nasal route 2 (two) times daily. 30 mL 0     biotin 1 mg tablet Take 1,000 mcg by mouth once daily.        cetirizine " (ZYRTEC) 10 MG tablet Take 1 tablet (10 mg total) by mouth once daily. 30 tablet 5     dexAMETHasone (DECADRON) 4 MG Tab Take 2 tablets (8 mg total) by mouth once daily. Take as directed on days 2, 3 and 4 of your chemotherapy cycle. 6 tablet 11     diclofenac sodium (VOLTAREN) 1 % Gel Apply once a day to back as needed 100 g 1     EPINEPHrine (EPIPEN) 0.3 mg/0.3 mL AtIn Inject 0.3 mLs (0.3 mg total) into the muscle once. for 1 dose 2 each 0     fluticasone propionate (FLONASE) 50 mcg/actuation nasal spray 1 spray (50 mcg total) by Each Nostril route every 12 (twelve) hours as needed for Rhinitis. 16 g 0     furosemide (LASIX) 40 MG tablet Take 1 tablet (40 mg total) by mouth once daily. (Patient taking differently: Take 40 mg by mouth daily as needed.) 30 tablet 11     ginkgo biloba 40 mg Tab Take 40 mg by mouth.       magnesium oxide (MAG-OX) 400 mg (241.3 mg magnesium) tablet Take 1 tablet (400 mg total) by mouth once daily. 1 tablet 0     multivitamin with minerals tablet Take 1 tablet by mouth once daily.        OLANZapine (ZYPREXA) 5 MG tablet Take 1 tablet (5 mg total) by mouth every evening on days 1 through 4 of your chemotherapy regimen. 4 tablet 11     ondansetron (ZOFRAN-ODT) 8 MG TbDL Dissolve 1 tablet (8 mg total) under the tongue every 6 (six) hours as needed. 30 tablet 11     pantoprazole (PROTONIX) 40 MG tablet Take 1 tablet (40 mg total) by mouth once daily. (Patient not taking: Reported on 5/18/2024) 90 tablet 1 Not Taking    potassium chloride SA (K-DUR,KLOR-CON) 20 MEQ tablet Take 1 tablet (20 mEq total) by mouth 2 (two) times daily. 2 tablet 0     prochlorperazine (COMPAZINE) 5 MG tablet Take 2 tablets (10 mg total) by mouth every 6 (six) hours as needed (nausea or vomiting). 30 tablet 11     zinc gluconate 50 mg tablet Take 50 mg by mouth once daily.                              .

## 2024-05-18 NOTE — ED NOTES
Pt sats dropped to the high 80s. Pt coughs with deep breathing. Pt placed on 2 lpm of oxygen via NC with improvement to 99%. April,NP with hospital medicine notified,=. Awaiting orders

## 2024-05-18 NOTE — ASSESSMENT & PLAN NOTE
Patient has a diagnosis of pneumonia. The cause of the pneumonia is suspected to be bacterial in etiology but organism is not known. The pneumonia is  new onset . The patient has the following signs/symptoms of pneumonia: cough, sputum production, and shortness of breath. The patient does not have a current oxygen requirement and the patient does not have a home oxygen requirement. I have reviewed the pertinent imaging. The following cultures have been collected: Blood cultures The culture results are listed below.     Current antimicrobial regimen consists of the antibiotics listed below. Will monitor patient closely and continue current treatment plan unchanged.    Antibiotics (From admission, onward)      Start     Stop Route Frequency Ordered    05/18/24 1400  levoFLOXacin 750 mg/150 mL IVPB 750 mg         05/19/24 0159 IV ED 1 Time 05/18/24 1321            Microbiology Results (last 7 days)       Procedure Component Value Units Date/Time    Blood culture x two cultures. Draw prior to antibiotics. [[id number]] Collected: 05/18/24 1138    Order Status: Sent Specimen: Blood from Peripheral, Forearm, Right     Blood culture x two cultures. Draw prior to antibiotics. [[id number]] Collected: 05/18/24 1055    Order Status: Sent Specimen: Blood from Peripheral, Antecubital, Right     Influenza A & B by Molecular [[id number]] Collected: 05/18/24 0946    Order Status: Completed Specimen: Nasal Swab Updated: 05/18/24 1012     Influenza A, Molecular Negative     Influenza B, Molecular Negative     Flu A & B Source Nasal swab

## 2024-05-18 NOTE — ASSESSMENT & PLAN NOTE
Patient is identified as having Systolic (HFrEF) heart failure that is Chronic. CHF is currently controlled. Latest ECHO performed and demonstrates- Results for orders placed during the hospital encounter of 02/26/24    Echo    Interpretation Summary    Left Ventricle: The left ventricle is normal in size. Mildly increased ventricular mass. Mildly increased wall thickness. There is concentric hypertrophy. There is normal systolic function with a visually estimated ejection fraction of 55 - 60%. There is normal diastolic function.    Right Ventricle: Normal right ventricular cavity size. Wall thickness is normal. Right ventricle wall motion  is normal. Systolic function is normal.    Pulmonic Valve: There is mild regurgitation.    IVC/SVC: Normal venous pressure at 3 mmHg.  . Continue Beta Blocker and monitor clinical status closely. Monitor on telemetry. Patient is off CHF pathway.  Monitor strict Is&Os and daily weights.  Place on fluid restriction of 1.5 L. Cardiology has not been consulted. Continue to stress to patient importance of self efficacy and  on diet for CHF. Last BNP reviewed- and noted below   Recent Labs   Lab 05/18/24  1055   BNP 21

## 2024-05-18 NOTE — ASSESSMENT & PLAN NOTE
Chronic, uncontrolled. Latest blood pressure and vitals reviewed-     Temp:  [98.4 °F (36.9 °C)]   Pulse:  [80-91]   Resp:  [19-24]   BP: (135-138)/(64-66)   SpO2:  [95 %-98 %] .   Home meds for hypertension were reviewed and noted below.   Hypertension Medications               amLODIPine (NORVASC) 5 MG tablet Take 2 tablets (10 mg total) by mouth once daily.    atenoloL (TENORMIN) 25 MG tablet Take 1 tablet (25 mg total) by mouth once daily.    olmesartan-hydrochlorothiazide (BENICAR HCT) 40-25 mg per tablet Take 1 tablet by mouth once daily.    furosemide (LASIX) 40 MG tablet Take 1 tablet (40 mg total) by mouth once daily.            While in the hospital, will manage blood pressure as follows; Continue home antihypertensive regimen    Will utilize p.r.n. blood pressure medication only if patient's blood pressure greater than 160/100 and she develops symptoms such as worsening chest pain or shortness of breath.

## 2024-05-18 NOTE — SUBJECTIVE & OBJECTIVE
Past Medical History:   Diagnosis Date    Anemia     Anxiety     Arthritis     knees    Cancer     ovarian    CHF (congestive heart failure)     Hx antineoplastic chemotherapy     last 2019    Hyperlipemia     Hypertension     Neck pain     Ovarian cancer 2019    CHEMO    Peritoneal carcinomatosis 2019       Past Surgical History:   Procedure Laterality Date    breast reduction  10/02/2018    CATARACT EXTRACTION Bilateral     2023     SECTION      X 1    COLONOSCOPY N/A 2021    Procedure: COLONOSCOPY;  Surgeon: Paulette Rojas MD;  Location: [facility] ENDO;  Service: Gastroenterology;  Laterality: N/A;    CYSTOSCOPY W/ URETERAL STENT PLACEMENT Right 2019    Procedure: CYSTOSCOPY, WITH URETERAL STENT INSERTION;  Surgeon: Timmy Santiago IV, MD;  Location: [facility] OR;  Service: Urology;  Laterality: Right;    CYSTOSCOPY W/ URETERAL STENT REMOVAL  10/04/2019    DILATION AND CURETTAGE OF UTERUS      HYSTERECTOMY      RALH for fibroids (still has ovaries)    INSERTION OF VENOUS ACCESS PORT Left 2019    Procedure: INSERTION, VENOUS ACCESS PORT;  Surgeon: Ulisses Monzon MD;  Location: [facility] OR;  Service: General;  Laterality: Left;  Left internal jugular     LYSIS OF ADHESIONS OF URETER N/A 08/15/2019    Procedure: URETEROLYSIS;  Surgeon: Ismael Juarez MD;  Location: [facility] OR;  Service: OB/GYN;  Laterality: N/A;    OMENTECTOMY N/A 08/15/2019    Procedure: OMENTECTOMY;  Surgeon: Ismael Juarez MD;  Location: [facility] OR;  Service: OB/GYN;  Laterality: N/A;    RETROGRADE PYELOGRAPHY Right 2019    Procedure: PYELOGRAM, RETROGRADE;  Surgeon: Timmy Santiago IV, MD;  Location: [facility] OR;  Service: Urology;  Laterality: Right;    ROBOT-ASSISTED LAPAROSCOPIC SALPINGO-OOPHORECTOMY USING DA TIA XI Bilateral 08/15/2019    Procedure: XI ROBOTIC SALPINGO-OOPHORECTOMY;  Surgeon: Ismael Juarez MD;  Location: [facility];  Service: OB/GYN;  Laterality: Bilateral;    TOTAL REDUCTION MAMMOPLASTY       TUBAL LIGATION         Review of patient's allergies indicates:  No Known Allergies    No current facility-administered medications on file prior to encounter.     Current Outpatient Medications on File Prior to Encounter   Medication Sig    ALPRAZolam (XANAX) 0.25 MG tablet Take 1 tablet (0.25 mg total) by mouth 3 (three) times daily as needed for Anxiety.    amLODIPine (NORVASC) 5 MG tablet Take 2 tablets (10 mg total) by mouth once daily.    atenoloL (TENORMIN) 25 MG tablet Take 1 tablet (25 mg total) by mouth once daily.    gabapentin (NEURONTIN) 300 MG capsule Take 1 capsule (300 mg total) by mouth 3 (three) times daily. (Patient taking differently: Take 600 mg by mouth 3 (three) times daily.)    olmesartan-hydrochlorothiazide (BENICAR HCT) 40-25 mg per tablet Take 1 tablet by mouth once daily.    potassium chloride SA (K-DUR,KLOR-CON) 20 MEQ tablet Take 1 tablet (20 mEq total) by mouth 2 (two) times daily.    rosuvastatin (CRESTOR) 10 MG tablet Take 1 tablet (10 mg total) by mouth once daily.    sertraline (ZOLOFT) 25 MG tablet Take 1 tablet (25 mg total) by mouth once daily.    albuterol 90 mcg/actuation inhaler Inhale 2 puffs into the lungs every 4 (four) hours as needed for Wheezing. Rescue    azelastine (ASTELIN) 137 mcg (0.1 %) nasal spray 1 spray (137 mcg total) by Nasal route 2 (two) times daily.    biotin 1 mg tablet Take 1,000 mcg by mouth once daily.     cetirizine (ZYRTEC) 10 MG tablet Take 1 tablet (10 mg total) by mouth once daily.    dexAMETHasone (DECADRON) 4 MG Tab Take 2 tablets (8 mg total) by mouth once daily. Take as directed on days 2, 3 and 4 of your chemotherapy cycle.    diclofenac sodium (VOLTAREN) 1 % Gel Apply once a day to back as needed    EPINEPHrine (EPIPEN) 0.3 mg/0.3 mL AtIn Inject 0.3 mLs (0.3 mg total) into the muscle once. for 1 dose    fluticasone propionate (FLONASE) 50 mcg/actuation nasal spray 1 spray (50 mcg total) by Each Nostril route every 12 (twelve) hours as  needed for Rhinitis.    furosemide (LASIX) 40 MG tablet Take 1 tablet (40 mg total) by mouth once daily. (Patient taking differently: Take 40 mg by mouth daily as needed.)    ginkgo biloba 40 mg Tab Take 40 mg by mouth.    magnesium oxide (MAG-OX) 400 mg (241.3 mg magnesium) tablet Take 1 tablet (400 mg total) by mouth once daily.    multivitamin with minerals tablet Take 1 tablet by mouth once daily.     OLANZapine (ZYPREXA) 5 MG tablet Take 1 tablet (5 mg total) by mouth every evening on days 1 through 4 of your chemotherapy regimen.    ondansetron (ZOFRAN-ODT) 8 MG TbDL Dissolve 1 tablet (8 mg total) under the tongue every 6 (six) hours as needed.    pantoprazole (PROTONIX) 40 MG tablet Take 1 tablet (40 mg total) by mouth once daily. (Patient not taking: Reported on 2024)    prochlorperazine (COMPAZINE) 5 MG tablet Take 2 tablets (10 mg total) by mouth every 6 (six) hours as needed (nausea or vomiting).    zinc gluconate 50 mg tablet Take 50 mg by mouth once daily.     Family History       Problem Relation (Age of Onset)    Anesthesia problems Other    Breast cancer Maternal Aunt, Paternal Aunt    Colon cancer Brother    Glaucoma Mother    No Known Problems Father    Ovarian cancer Paternal Aunt          Tobacco Use    Smoking status: Former     Current packs/day: 0.00     Average packs/day: 1 pack/day for 25.0 years (25.0 ttl pk-yrs)     Types: Cigarettes     Start date: 10/1/1993     Quit date: 10/1/2018     Years since quittin.6    Smokeless tobacco: Never    Tobacco comments:     States started quit 2 months ago after 30 years   Substance and Sexual Activity    Alcohol use: Yes     Comment: occasionally  No alcohol 72h prior to sx    Drug use: No    Sexual activity: Not Currently     Partners: Male     Comment: hyst; mut monog     Review of Systems   Constitutional:  Positive for activity change, appetite change, fatigue and fever.   HENT:  Positive for congestion.    Respiratory:  Positive for  cough, chest tightness and shortness of breath.    Cardiovascular:  Positive for chest pain and leg swelling (Left).   Gastrointestinal:  Positive for abdominal distention.   Musculoskeletal:  Positive for arthralgias.   Neurological:  Positive for weakness.     Objective:     Vital Signs (Most Recent):  Temp: 98.4 °F (36.9 °C) (05/18/24 0910)  Pulse: 91 (05/18/24 1404)  Resp: 19 (05/18/24 1404)  BP: 135/64 (05/18/24 1143)  SpO2: 96 % (05/18/24 1404) Vital Signs (24h Range):  Temp:  [98.4 °F (36.9 °C)] 98.4 °F (36.9 °C)  Pulse:  [80-91] 91  Resp:  [19-24] 19  SpO2:  [95 %-98 %] 96 %  BP: (135-138)/(64-66) 135/64     Weight: 115.9 kg (255 lb 8.2 oz)  Body mass index is 40.02 kg/m².     Physical Exam  Vitals and nursing note reviewed.   Constitutional:       General: She is not in acute distress.     Appearance: She is well-developed. She is not ill-appearing.   HENT:      Head: Normocephalic and atraumatic.      Right Ear: Hearing and external ear normal.      Left Ear: Hearing and external ear normal.      Nose: No rhinorrhea.      Right Sinus: No maxillary sinus tenderness or frontal sinus tenderness.      Left Sinus: No maxillary sinus tenderness or frontal sinus tenderness.      Mouth/Throat:      Mouth: No oral lesions.      Pharynx: Uvula midline.   Eyes:      General:         Right eye: No discharge.         Left eye: No discharge.      Conjunctiva/sclera: Conjunctivae normal.      Pupils: Pupils are equal, round, and reactive to light.   Neck:      Thyroid: No thyromegaly.      Vascular: No carotid bruit or JVD.      Trachea: No tracheal deviation.     Cardiovascular:      Rate and Rhythm: Normal rate and regular rhythm.      Pulses:           Dorsalis pedis pulses are 2+ on the right side and 2+ on the left side.      Heart sounds: Normal heart sounds, S1 normal and S2 normal. No murmur heard.  Pulmonary:      Effort: Pulmonary effort is normal. No respiratory distress.      Breath sounds: Examination of the  right-lower field reveals decreased breath sounds. Examination of the left-lower field reveals decreased breath sounds. Decreased breath sounds present.   Chest:       Abdominal:      General: Bowel sounds are normal. There is no distension.      Palpations: Abdomen is soft. There is no mass.      Tenderness: There is no abdominal tenderness.   Musculoskeletal:         General: Normal range of motion.      Cervical back: Normal range of motion.      Left lower leg: Edema present.   Lymphadenopathy:      Cervical: No cervical adenopathy.      Right cervical: No superficial cervical adenopathy.     Left cervical: No superficial cervical adenopathy.      Upper Body:      Right upper body: No supraclavicular adenopathy.      Left upper body: No supraclavicular adenopathy.   Skin:     General: Skin is warm and dry.      Capillary Refill: Capillary refill takes less than 2 seconds.      Findings: No rash.   Neurological:      Mental Status: She is alert and oriented to person, place, and time.   Psychiatric:         Mood and Affect: Mood is anxious. Mood is not depressed.         Speech: Speech normal.         Behavior: Behavior normal.         Thought Content: Thought content normal.         Judgment: Judgment normal.              CRANIAL NERVES     CN III, IV, VI   Pupils are equal, round, and reactive to light.       Significant Labs: All pertinent labs within the past 24 hours have been reviewed.  CBC:   Recent Labs   Lab 05/18/24  1055   WBC 5.09   HGB 11.0*   HCT 34.4*        CMP:   Recent Labs   Lab 05/18/24  1055      K 3.6      CO2 24      BUN 10   CREATININE 0.8   CALCIUM 9.2   PROT 6.6   ALBUMIN 3.5   BILITOT 0.6   ALKPHOS 108   AST 52*   ALT 56*   ANIONGAP 10     Lactic Acid:   Recent Labs   Lab 05/18/24  1055 05/18/24  1310   LACTATE 1.0 1.2     Troponin:   Recent Labs   Lab 05/18/24  1055   TROPONINI 0.082*       Significant Imaging: I have reviewed all pertinent imaging  results/findings within the past 24 hours.

## 2024-05-18 NOTE — ED NOTES
Surgical report from 02/18/2019 reviewed and confirmed that patient's port IS A POWER PORT. Okay to access and use port per Dr. Velasco. Okay to get on of the two blood cultures from the port per Dr. Velasco.

## 2024-05-18 NOTE — ASSESSMENT & PLAN NOTE
Left lower extremity edema, history of lower abdominal surgeries related to ovarian cancer.  Per review of pathology report from 2019, there were no lymph nodes submitted for pathology.    --ultrasound left lower extremity

## 2024-05-18 NOTE — PROGRESS NOTES
Pharmacist Renal Dose Adjustment Note    Casie Gaines is a 68 y.o. female being treated with the medication enoxaparin.     Patient Data:    Vital Signs (Most Recent):  Temp: 98.4 °F (36.9 °C) (05/18/24 0910)  Pulse: 91 (05/18/24 1404)  Resp: 19 (05/18/24 1404)  BP: 135/64 (05/18/24 1143)  SpO2: 96 % (05/18/24 1404) Vital Signs (72h Range):  Temp:  [98.4 °F (36.9 °C)]   Pulse:  [80-91]   Resp:  [19-24]   BP: (135-138)/(64-66)   SpO2:  [95 %-98 %]      Recent Labs   Lab 05/18/24  1055   CREATININE 0.8     Serum creatinine: 0.8 mg/dL 05/18/24 1055  Estimated creatinine clearance: 87.3 mL/min  BMI = 40.02    Enoxaparin 40 mg subq every 24 hours will be changed to enoxaparin 40 mg subq every 12 hours per renal dose protocol for BMI 40-50 and CrCl > 30 ml/min.     Thank you,   Pharmacist's Name: Papito Roman

## 2024-05-18 NOTE — H&P
[facility] - Emergency Dept.  Hospital Medicine  History & Physical    Patient Name: Casie Gaines  MRN: [id number]  Patient Class: OP- Observation  Admission Date: 5/18/2024  Attending Physician: Kristine Romero MD   Primary Care Provider: Rossana Ang MD         Patient information was obtained from patient, past medical records, and ER records.     Subjective:     Principal Problem:Pneumonia    Chief Complaint:   Chief Complaint   Patient presents with    Cough     Cough and fever x 3 days. Exertional shortness of breath.         HPI: 68 y.o. female patient with a PMHx of HTN, CHF, anemia, anxiety, Medi[employer], and ovarian cancer (last chemo 2/2024). Presented to the Emergency Department for evaluation of a cough which onset gradually beginning 3 days PTA.  Also complains of left-sided swelling/edema, reported swelling of left side of face neck left upper extremity left lower extremity.  On exam left lower extremity edematous from hip to toes, mild edema to left side of face left neck and left arm.  Symptoms are constant and moderate in severity. No mitigating or exacerbating factors reported. Associated sxs include SOB, fever (100.6), headache, lower abdominal pain and diarrhea. Patient denies any dysuria, back pain, weakness, and all other sxs at this time. Prior Tx includes Tylenol.  In the ED, vitals:  138/66, 88, 24, 98.4 F, 95% RA.  Patient noted to have desaturations to 90-91% with talking, dyspnea with conversation.  Significant labs: HGB: 11.0, AST:  52, ALT: 56, troponin:  0.082, procalcitonin: 0.40.  CTA chest:Moderate size consolidative opacity in the right lung concerning for infection.  CXR: No acute findings.  EKG:  Negative for STEMI.  Blood cultures collected.  Unable to collect a sample from [facility].  Initiated on IV antibiotics in the ED. patient is a full code.  Placed in observation under the care of Hospital Medicine for management of pneumonia.    Past Medical History:   Diagnosis  Date    Anemia     Anxiety     Arthritis     knees    Cancer     ovarian    CHF (congestive heart failure)     Hx antineoplastic chemotherapy     last 2019    Hyperlipemia     Hypertension     Neck pain     Ovarian cancer 2019    CHEMO    Peritoneal carcinomatosis 2019       Past Surgical History:   Procedure Laterality Date    breast reduction  10/02/2018    CATARACT EXTRACTION Bilateral     2023     SECTION      X 1    COLONOSCOPY N/A 2021    Procedure: COLONOSCOPY;  Surgeon: Paulette Rojas MD;  Location: [facility] ENDO;  Service: Gastroenterology;  Laterality: N/A;    CYSTOSCOPY W/ URETERAL STENT PLACEMENT Right 2019    Procedure: CYSTOSCOPY, WITH URETERAL STENT INSERTION;  Surgeon: Timmy Santiago IV, MD;  Location: [facility] OR;  Service: Urology;  Laterality: Right;    CYSTOSCOPY W/ URETERAL STENT REMOVAL  10/04/2019    DILATION AND CURETTAGE OF UTERUS      HYSTERECTOMY      RALH for fibroids (still has ovaries)    INSERTION OF VENOUS ACCESS PORT Left 2019    Procedure: INSERTION, VENOUS ACCESS PORT;  Surgeon: Ulisses Monzon MD;  Location: [facility] OR;  Service: General;  Laterality: Left;  Left internal jugular     LYSIS OF ADHESIONS OF URETER N/A 08/15/2019    Procedure: URETEROLYSIS;  Surgeon: Ismael Juarez MD;  Location: [facility] OR;  Service: OB/GYN;  Laterality: N/A;    OMENTECTOMY N/A 08/15/2019    Procedure: OMENTECTOMY;  Surgeon: Ismael Juarez MD;  Location: [facility] OR;  Service: OB/GYN;  Laterality: N/A;    RETROGRADE PYELOGRAPHY Right 2019    Procedure: PYELOGRAM, RETROGRADE;  Surgeon: Timmy Santiago IV, MD;  Location: [facility] OR;  Service: Urology;  Laterality: Right;    ROBOT-ASSISTED LAPAROSCOPIC SALPINGO-OOPHORECTOMY USING DA TIA XI Bilateral 08/15/2019    Procedure: XI ROBOTIC SALPINGO-OOPHORECTOMY;  Surgeon: Ismael Juarez MD;  Location: [facility];  Service: OB/GYN;  Laterality: Bilateral;    TOTAL REDUCTION MAMMOPLASTY  2018    TUBAL LIGATION         Review  of patient's allergies indicates:  No Known Allergies    No current facility-administered medications on file prior to encounter.     Current Outpatient Medications on File Prior to Encounter   Medication Sig    ALPRAZolam (XANAX) 0.25 MG tablet Take 1 tablet (0.25 mg total) by mouth 3 (three) times daily as needed for Anxiety.    amLODIPine (NORVASC) 5 MG tablet Take 2 tablets (10 mg total) by mouth once daily.    atenoloL (TENORMIN) 25 MG tablet Take 1 tablet (25 mg total) by mouth once daily.    gabapentin (NEURONTIN) 300 MG capsule Take 1 capsule (300 mg total) by mouth 3 (three) times daily. (Patient taking differently: Take 600 mg by mouth 3 (three) times daily.)    olmesartan-hydrochlorothiazide (BENICAR HCT) 40-25 mg per tablet Take 1 tablet by mouth once daily.    potassium chloride SA (K-DUR,KLOR-CON) 20 MEQ tablet Take 1 tablet (20 mEq total) by mouth 2 (two) times daily.    rosuvastatin (CRESTOR) 10 MG tablet Take 1 tablet (10 mg total) by mouth once daily.    sertraline (ZOLOFT) 25 MG tablet Take 1 tablet (25 mg total) by mouth once daily.    albuterol 90 mcg/actuation inhaler Inhale 2 puffs into the lungs every 4 (four) hours as needed for Wheezing. Rescue    azelastine (ASTELIN) 137 mcg (0.1 %) nasal spray 1 spray (137 mcg total) by Nasal route 2 (two) times daily.    biotin 1 mg tablet Take 1,000 mcg by mouth once daily.     cetirizine (ZYRTEC) 10 MG tablet Take 1 tablet (10 mg total) by mouth once daily.    dexAMETHasone (DECADRON) 4 MG Tab Take 2 tablets (8 mg total) by mouth once daily. Take as directed on days 2, 3 and 4 of your chemotherapy cycle.    diclofenac sodium (VOLTAREN) 1 % Gel Apply once a day to back as needed    EPINEPHrine (EPIPEN) 0.3 mg/0.3 mL AtIn Inject 0.3 mLs (0.3 mg total) into the muscle once. for 1 dose    fluticasone propionate (FLONASE) 50 mcg/actuation nasal spray 1 spray (50 mcg total) by Each Nostril route every 12 (twelve) hours as needed for Rhinitis.    furosemide  (LASIX) 40 MG tablet Take 1 tablet (40 mg total) by mouth once daily. (Patient taking differently: Take 40 mg by mouth daily as needed.)    ginkgo biloba 40 mg Tab Take 40 mg by mouth.    magnesium oxide (MAG-OX) 400 mg (241.3 mg magnesium) tablet Take 1 tablet (400 mg total) by mouth once daily.    multivitamin with minerals tablet Take 1 tablet by mouth once daily.     OLANZapine (ZYPREXA) 5 MG tablet Take 1 tablet (5 mg total) by mouth every evening on days 1 through 4 of your chemotherapy regimen.    ondansetron (ZOFRAN-ODT) 8 MG TbDL Dissolve 1 tablet (8 mg total) under the tongue every 6 (six) hours as needed.    pantoprazole (PROTONIX) 40 MG tablet Take 1 tablet (40 mg total) by mouth once daily. (Patient not taking: Reported on 2024)    prochlorperazine (COMPAZINE) 5 MG tablet Take 2 tablets (10 mg total) by mouth every 6 (six) hours as needed (nausea or vomiting).    zinc gluconate 50 mg tablet Take 50 mg by mouth once daily.     Family History       Problem Relation (Age of Onset)    Anesthesia problems Other    Breast cancer Maternal Aunt, Paternal Aunt    Colon cancer Brother    Glaucoma Mother    No Known Problems Father    Ovarian cancer Paternal Aunt          Tobacco Use    Smoking status: Former     Current packs/day: 0.00     Average packs/day: 1 pack/day for 25.0 years (25.0 ttl pk-yrs)     Types: Cigarettes     Start date: 10/1/1993     Quit date: 10/1/2018     Years since quittin.6    Smokeless tobacco: Never    Tobacco comments:     States started quit 2 months ago after 30 years   Substance and Sexual Activity    Alcohol use: Yes     Comment: occasionally  No alcohol 72h prior to sx    Drug use: No    Sexual activity: Not Currently     Partners: Male     Comment: hyst; mut monog     Review of Systems   Constitutional:  Positive for activity change, appetite change, fatigue and fever.   HENT:  Positive for congestion.    Respiratory:  Positive for cough, chest tightness and shortness of  breath.    Cardiovascular:  Positive for chest pain and leg swelling (Left).   Gastrointestinal:  Positive for abdominal distention.   Musculoskeletal:  Positive for arthralgias.   Neurological:  Positive for weakness.     Objective:     Vital Signs (Most Recent):  Temp: 98.4 °F (36.9 °C) (05/18/24 0910)  Pulse: 91 (05/18/24 1404)  Resp: 19 (05/18/24 1404)  BP: 135/64 (05/18/24 1143)  SpO2: 96 % (05/18/24 1404) Vital Signs (24h Range):  Temp:  [98.4 °F (36.9 °C)] 98.4 °F (36.9 °C)  Pulse:  [80-91] 91  Resp:  [19-24] 19  SpO2:  [95 %-98 %] 96 %  BP: (135-138)/(64-66) 135/64     Weight: 115.9 kg (255 lb 8.2 oz)  Body mass index is 40.02 kg/m².     Physical Exam  Vitals and nursing note reviewed.   Constitutional:       General: She is not in acute distress.     Appearance: She is well-developed. She is not ill-appearing.   HENT:      Head: Normocephalic and atraumatic.      Right Ear: Hearing and external ear normal.      Left Ear: Hearing and external ear normal.      Nose: No rhinorrhea.      Right Sinus: No maxillary sinus tenderness or frontal sinus tenderness.      Left Sinus: No maxillary sinus tenderness or frontal sinus tenderness.      Mouth/Throat:      Mouth: No oral lesions.      Pharynx: Uvula midline.   Eyes:      General:         Right eye: No discharge.         Left eye: No discharge.      Conjunctiva/sclera: Conjunctivae normal.      Pupils: Pupils are equal, round, and reactive to light.   Neck:      Thyroid: No thyromegaly.      Vascular: No carotid bruit or JVD.      Trachea: No tracheal deviation.     Cardiovascular:      Rate and Rhythm: Normal rate and regular rhythm.      Pulses:           Dorsalis pedis pulses are 2+ on the right side and 2+ on the left side.      Heart sounds: Normal heart sounds, S1 normal and S2 normal. No murmur heard.  Pulmonary:      Effort: Pulmonary effort is normal. No respiratory distress.      Breath sounds: Examination of the right-lower field reveals decreased  breath sounds. Examination of the left-lower field reveals decreased breath sounds. Decreased breath sounds present.   Chest:       Abdominal:      General: Bowel sounds are normal. There is no distension.      Palpations: Abdomen is soft. There is no mass.      Tenderness: There is no abdominal tenderness.   Musculoskeletal:         General: Normal range of motion.      Cervical back: Normal range of motion.      Left lower leg: Edema present.   Lymphadenopathy:      Cervical: No cervical adenopathy.      Right cervical: No superficial cervical adenopathy.     Left cervical: No superficial cervical adenopathy.      Upper Body:      Right upper body: No supraclavicular adenopathy.      Left upper body: No supraclavicular adenopathy.   Skin:     General: Skin is warm and dry.      Capillary Refill: Capillary refill takes less than 2 seconds.      Findings: No rash.   Neurological:      Mental Status: She is alert and oriented to person, place, and time.   Psychiatric:         Mood and Affect: Mood is anxious. Mood is not depressed.         Speech: Speech normal.         Behavior: Behavior normal.         Thought Content: Thought content normal.         Judgment: Judgment normal.              CRANIAL NERVES     CN III, IV, VI   Pupils are equal, round, and reactive to light.       Significant Labs: All pertinent labs within the past 24 hours have been reviewed.  CBC:   Recent Labs   Lab 05/18/24  1055   WBC 5.09   HGB 11.0*   HCT 34.4*        CMP:   Recent Labs   Lab 05/18/24  1055      K 3.6      CO2 24      BUN 10   CREATININE 0.8   CALCIUM 9.2   PROT 6.6   ALBUMIN 3.5   BILITOT 0.6   ALKPHOS 108   AST 52*   ALT 56*   ANIONGAP 10     Lactic Acid:   Recent Labs   Lab 05/18/24  1055 05/18/24  1310   LACTATE 1.0 1.2     Troponin:   Recent Labs   Lab 05/18/24  1055   TROPONINI 0.082*       Significant Imaging: I have reviewed all pertinent imaging results/findings within the past 24  hours.  Assessment/Plan:     * Pneumonia  Patient has a diagnosis of pneumonia. The cause of the pneumonia is suspected to be bacterial in etiology but organism is not known. The pneumonia is  new onset . The patient has the following signs/symptoms of pneumonia: cough, sputum production, and shortness of breath. The patient does not have a current oxygen requirement and the patient does not have a home oxygen requirement. I have reviewed the pertinent imaging. The following cultures have been collected: Blood cultures The culture results are listed below.     Current antimicrobial regimen consists of the antibiotics listed below. Will monitor patient closely and continue current treatment plan unchanged.    Antibiotics (From admission, onward)      Start     Stop Route Frequency Ordered    05/18/24 1400  levoFLOXacin 750 mg/150 mL IVPB 750 mg         05/19/24 0159 IV ED 1 Time 05/18/24 1321            Microbiology Results (last 7 days)       Procedure Component Value Units Date/Time    Blood culture x two cultures. Draw prior to antibiotics. [[id number]] Collected: 05/18/24 1138    Order Status: Sent Specimen: Blood from Peripheral, Forearm, Right     Blood culture x two cultures. Draw prior to antibiotics. [[id number]] Collected: 05/18/24 1055    Order Status: Sent Specimen: Blood from Peripheral, Antecubital, Right     Influenza A & B by Molecular [[id number]] Collected: 05/18/24 0946    Order Status: Completed Specimen: Nasal Swab Updated: 05/18/24 1012     Influenza A, Molecular Negative     Influenza B, Molecular Negative     Flu A & B Source Nasal swab            Lower extremity edema  Left lower extremity edema, history of lower abdominal surgeries related to ovarian cancer.  Per review of pathology report from 2019, there were no lymph nodes submitted for pathology.    --ultrasound left lower extremity      Elevated troponin  Likely secondary to demand ischemia related to pneumonia, dyspnea.  Will rule out  ACS.     --trend troponin  --tele  --EKG p.r.n. chest pain/rhythm changes      Ovarian cancer, bilateral  Followed by oncology, last chemotherapy 02/2024    --follow-up Oncology      History of CHF (congestive heart failure)  Patient is identified as having Systolic (HFrEF) heart failure that is Chronic. CHF is currently controlled. Latest ECHO performed and demonstrates- Results for orders placed during the hospital encounter of 02/26/24    Echo    Interpretation Summary    Left Ventricle: The left ventricle is normal in size. Mildly increased ventricular mass. Mildly increased wall thickness. There is concentric hypertrophy. There is normal systolic function with a visually estimated ejection fraction of 55 - 60%. There is normal diastolic function.    Right Ventricle: Normal right ventricular cavity size. Wall thickness is normal. Right ventricle wall motion  is normal. Systolic function is normal.    Pulmonic Valve: There is mild regurgitation.    IVC/SVC: Normal venous pressure at 3 mmHg.  . Continue Beta Blocker and monitor clinical status closely. Monitor on telemetry. Patient is off CHF pathway.  Monitor strict Is&Os and daily weights.  Place on fluid restriction of 1.5 L. Cardiology has not been consulted. Continue to stress to patient importance of self efficacy and  on diet for CHF. Last BNP reviewed- and noted below   Recent Labs   Lab 05/18/24  1055   BNP 21          Hypertension  Chronic, uncontrolled. Latest blood pressure and vitals reviewed-     Temp:  [98.4 °F (36.9 °C)]   Pulse:  [80-91]   Resp:  [19-24]   BP: (135-138)/(64-66)   SpO2:  [95 %-98 %] .   Home meds for hypertension were reviewed and noted below.   Hypertension Medications               amLODIPine (NORVASC) 5 MG tablet Take 2 tablets (10 mg total) by mouth once daily.    atenoloL (TENORMIN) 25 MG tablet Take 1 tablet (25 mg total) by mouth once daily.    olmesartan-hydrochlorothiazide (BENICAR HCT) 40-25 mg per tablet Take 1  tablet by mouth once daily.    furosemide (LASIX) 40 MG tablet Take 1 tablet (40 mg total) by mouth once daily.            While in the hospital, will manage blood pressure as follows; Continue home antihypertensive regimen    Will utilize p.r.n. blood pressure medication only if patient's blood pressure greater than 160/100 and she develops symptoms such as worsening chest pain or shortness of breath.      VTE Risk Mitigation (From admission, onward)           Ordered     enoxaparin injection 40 mg  2 times daily         05/18/24 1405     IP VTE HIGH RISK PATIENT  Once         05/18/24 1405     Place sequential compression device  Until discontinued         05/18/24 1405                         On 05/18/2024, patient should be placed in hospital observation services under my care in collaboration with Kristine Romero MD.      Pharmacist Renal Dose Adjustment Note    Casie Gaines is a 68 y.o. female being treated with the medication enoxaparin.     Patient Data:    Vital Signs (Most Recent):  Temp: 98.4 °F (36.9 °C) (05/18/24 0910)  Pulse: 91 (05/18/24 1404)  Resp: 19 (05/18/24 1404)  BP: 135/64 (05/18/24 1143)  SpO2: 96 % (05/18/24 1404) Vital Signs (72h Range):  Temp:  [98.4 °F (36.9 °C)]   Pulse:  [80-91]   Resp:  [19-24]   BP: (135-138)/(64-66)   SpO2:  [95 %-98 %]      Recent Labs   Lab 05/18/24  1055   CREATININE 0.8     Serum creatinine: 0.8 mg/dL 05/18/24 1055  Estimated creatinine clearance: 87.3 mL/min  BMI = 40.02    Enoxaparin 40 mg subq every 24 hours will be changed to enoxaparin 40 mg subq every 12 hours per renal dose protocol for BMI 40-50 and CrCl > 30 ml/min.     Thank you,   Pharmacist's Name: Papito Valdes NP  Department of Hospital Medicine  [facility] - Emergency Dept.

## 2024-05-19 LAB
ALBUMIN SERPL BCP-MCNC: 3.4 G/DL (ref 3.5–5.2)
ALP SERPL-CCNC: 108 U/L (ref 55–135)
ALT SERPL W/O P-5'-P-CCNC: 41 U/L (ref 10–44)
ANION GAP SERPL CALC-SCNC: 13 MMOL/L (ref 8–16)
AST SERPL-CCNC: 35 U/L (ref 10–40)
BASOPHILS # BLD AUTO: 0.02 K/UL (ref 0–0.2)
BASOPHILS NFR BLD: 0.4 % (ref 0–1.9)
BILIRUB SERPL-MCNC: 0.7 MG/DL (ref 0.1–1)
BUN SERPL-MCNC: 8 MG/DL (ref 8–23)
CALCIUM SERPL-MCNC: 9.2 MG/DL (ref 8.7–10.5)
CHLORIDE SERPL-SCNC: 103 MMOL/L (ref 95–110)
CO2 SERPL-SCNC: 23 MMOL/L (ref 23–29)
CREAT SERPL-MCNC: 1 MG/DL (ref 0.5–1.4)
DIFFERENTIAL METHOD BLD: ABNORMAL
EOSINOPHIL # BLD AUTO: 0 K/UL (ref 0–0.5)
EOSINOPHIL NFR BLD: 0.8 % (ref 0–8)
ERYTHROCYTE [DISTWIDTH] IN BLOOD BY AUTOMATED COUNT: 15 % (ref 11.5–14.5)
EST. GFR  (NO RACE VARIABLE): >60 ML/MIN/1.73 M^2
GLUCOSE SERPL-MCNC: 122 MG/DL (ref 70–110)
HCT VFR BLD AUTO: 34.8 % (ref 37–48.5)
HGB BLD-MCNC: 11 G/DL (ref 12–16)
IMM GRANULOCYTES # BLD AUTO: 0.01 K/UL (ref 0–0.04)
IMM GRANULOCYTES NFR BLD AUTO: 0.2 % (ref 0–0.5)
LYMPHOCYTES # BLD AUTO: 1.1 K/UL (ref 1–4.8)
LYMPHOCYTES NFR BLD: 22.7 % (ref 18–48)
MAGNESIUM SERPL-MCNC: 1.4 MG/DL (ref 1.6–2.6)
MCH RBC QN AUTO: 28.6 PG (ref 27–31)
MCHC RBC AUTO-ENTMCNC: 31.6 G/DL (ref 32–36)
MCV RBC AUTO: 90 FL (ref 82–98)
MONOCYTES # BLD AUTO: 0.4 K/UL (ref 0.3–1)
MONOCYTES NFR BLD: 7.2 % (ref 4–15)
NEUTROPHILS # BLD AUTO: 3.4 K/UL (ref 1.8–7.7)
NEUTROPHILS NFR BLD: 68.7 % (ref 38–73)
NRBC BLD-RTO: 0 /100 WBC
OHS QRS DURATION: 80 MS
OHS QTC CALCULATION: 455 MS
PHOSPHATE SERPL-MCNC: 2.8 MG/DL (ref 2.7–4.5)
PLATELET # BLD AUTO: 196 K/UL (ref 150–450)
PMV BLD AUTO: 9.5 FL (ref 9.2–12.9)
POTASSIUM SERPL-SCNC: 3.2 MMOL/L (ref 3.5–5.1)
PROT SERPL-MCNC: 6.8 G/DL (ref 6–8.4)
RBC # BLD AUTO: 3.85 M/UL (ref 4–5.4)
SODIUM SERPL-SCNC: 139 MMOL/L (ref 136–145)
WBC # BLD AUTO: 4.98 K/UL (ref 3.9–12.7)

## 2024-05-19 PROCEDURE — 80053 COMPREHEN METABOLIC PANEL: CPT | Performed by: NURSE PRACTITIONER

## 2024-05-19 PROCEDURE — 25000003 PHARM REV CODE 250: Performed by: NURSE PRACTITIONER

## 2024-05-19 PROCEDURE — 36415 COLL VENOUS BLD VENIPUNCTURE: CPT | Performed by: NURSE PRACTITIONER

## 2024-05-19 PROCEDURE — 21400001 HC TELEMETRY ROOM

## 2024-05-19 PROCEDURE — 11000001 HC ACUTE MED/SURG PRIVATE ROOM

## 2024-05-19 PROCEDURE — 84100 ASSAY OF PHOSPHORUS: CPT | Performed by: NURSE PRACTITIONER

## 2024-05-19 PROCEDURE — 83735 ASSAY OF MAGNESIUM: CPT | Performed by: NURSE PRACTITIONER

## 2024-05-19 PROCEDURE — 96372 THER/PROPH/DIAG INJ SC/IM: CPT | Performed by: NURSE PRACTITIONER

## 2024-05-19 PROCEDURE — 25000242 PHARM REV CODE 250 ALT 637 W/ HCPCS: Performed by: NURSE PRACTITIONER

## 2024-05-19 PROCEDURE — 96368 THER/DIAG CONCURRENT INF: CPT

## 2024-05-19 PROCEDURE — 85025 COMPLETE CBC W/AUTO DIFF WBC: CPT | Performed by: NURSE PRACTITIONER

## 2024-05-19 PROCEDURE — 27000221 HC OXYGEN, UP TO 24 HOURS

## 2024-05-19 PROCEDURE — 94761 N-INVAS EAR/PLS OXIMETRY MLT: CPT

## 2024-05-19 PROCEDURE — 96366 THER/PROPH/DIAG IV INF ADDON: CPT

## 2024-05-19 PROCEDURE — 94640 AIRWAY INHALATION TREATMENT: CPT | Mod: XB

## 2024-05-19 PROCEDURE — 94618 PULMONARY STRESS TESTING: CPT

## 2024-05-19 PROCEDURE — 99900035 HC TECH TIME PER 15 MIN (STAT)

## 2024-05-19 PROCEDURE — 63600175 PHARM REV CODE 636 W HCPCS: Performed by: NURSE PRACTITIONER

## 2024-05-19 RX ORDER — FLUCONAZOLE 100 MG/1
100 TABLET ORAL DAILY
Status: DISCONTINUED | OUTPATIENT
Start: 2024-05-19 | End: 2024-05-19

## 2024-05-19 RX ORDER — POTASSIUM CHLORIDE 20 MEQ/1
40 TABLET, EXTENDED RELEASE ORAL ONCE
Status: COMPLETED | OUTPATIENT
Start: 2024-05-19 | End: 2024-05-19

## 2024-05-19 RX ORDER — MAGNESIUM SULFATE HEPTAHYDRATE 40 MG/ML
2 INJECTION, SOLUTION INTRAVENOUS ONCE
Status: COMPLETED | OUTPATIENT
Start: 2024-05-19 | End: 2024-05-19

## 2024-05-19 RX ADMIN — ENOXAPARIN SODIUM 40 MG: 40 INJECTION SUBCUTANEOUS at 10:05

## 2024-05-19 RX ADMIN — AMLODIPINE BESYLATE 10 MG: 10 TABLET ORAL at 10:05

## 2024-05-19 RX ADMIN — GABAPENTIN 600 MG: 300 CAPSULE ORAL at 08:05

## 2024-05-19 RX ADMIN — ENOXAPARIN SODIUM 40 MG: 40 INJECTION SUBCUTANEOUS at 08:05

## 2024-05-19 RX ADMIN — GABAPENTIN 600 MG: 300 CAPSULE ORAL at 03:05

## 2024-05-19 RX ADMIN — ACETAMINOPHEN 650 MG: 325 TABLET ORAL at 03:05

## 2024-05-19 RX ADMIN — MAGNESIUM SULFATE HEPTAHYDRATE 2 G: 40 INJECTION, SOLUTION INTRAVENOUS at 03:05

## 2024-05-19 RX ADMIN — ATORVASTATIN CALCIUM 40 MG: 40 TABLET, FILM COATED ORAL at 10:05

## 2024-05-19 RX ADMIN — SERTRALINE HYDROCHLORIDE 25 MG: 25 TABLET ORAL at 10:05

## 2024-05-19 RX ADMIN — ATENOLOL 25 MG: 25 TABLET ORAL at 10:05

## 2024-05-19 RX ADMIN — FLUCONAZOLE 100 MG: 2 INJECTION, SOLUTION INTRAVENOUS at 05:05

## 2024-05-19 RX ADMIN — LEVOFLOXACIN 750 MG: 750 INJECTION, SOLUTION INTRAVENOUS at 03:05

## 2024-05-19 RX ADMIN — IPRATROPIUM BROMIDE AND ALBUTEROL SULFATE 3 ML: 2.5; .5 SOLUTION RESPIRATORY (INHALATION) at 08:05

## 2024-05-19 RX ADMIN — IPRATROPIUM BROMIDE AND ALBUTEROL SULFATE 3 ML: 2.5; .5 SOLUTION RESPIRATORY (INHALATION) at 07:05

## 2024-05-19 RX ADMIN — ACETAMINOPHEN 650 MG: 325 TABLET ORAL at 12:05

## 2024-05-19 RX ADMIN — POTASSIUM CHLORIDE 40 MEQ: 1500 TABLET, EXTENDED RELEASE ORAL at 05:05

## 2024-05-19 RX ADMIN — GABAPENTIN 600 MG: 300 CAPSULE ORAL at 10:05

## 2024-05-19 RX ADMIN — IPRATROPIUM BROMIDE AND ALBUTEROL SULFATE 3 ML: 2.5; .5 SOLUTION RESPIRATORY (INHALATION) at 12:05

## 2024-05-19 NOTE — PLAN OF CARE
Bed alarm refused. Call bell and personal items within reach. Pt SOB with exertion. Temp elevated, PRN tylenol given. Chart check complete.     Problem: Adult Inpatient Plan of Care  Goal: Plan of Care Review  Outcome: Progressing     Problem: Adult Inpatient Plan of Care  Goal: Patient-Specific Goal (Individualized)  Outcome: Progressing     Problem: Adult Inpatient Plan of Care  Goal: Absence of Hospital-Acquired Illness or Injury  Outcome: Progressing     Problem: Adult Inpatient Plan of Care  Goal: Optimal Comfort and Wellbeing  Outcome: Progressing

## 2024-05-19 NOTE — PLAN OF CARE
Discussed poc with pt, pt verbalized understanding    Purposeful rounding every 2hours    VS wnl  Cardiac monitoring in use, pt is NSR, tele monitor # [id number]  Fall precautions in place, remains injury free  Pain under control with PRN meds    IVFs  Abx given as prescribed  Bed locked at lowest position  Call light within reach    Chart check complete  Will cont with POC

## 2024-05-19 NOTE — PROGRESS NOTES
[facility] Medicine  Progress Note    Patient Name: Casie Gaines  MRN: [id number]  Patient Class: OP- Observation   Admission Date: 5/18/2024  Length of Stay: 0 days  Attending Physician: Ashley Birmingham MD  Primary Care Provider: Rossana Ang MD        Subjective:     Principal Problem:Pneumonia        HPI:  68 y.o. female patient with a PMHx of HTN, CHF, anemia, anxiety, [employer], and ovarian cancer (last chemo 2/2024). Presented to the Emergency Department for evaluation of a cough which onset gradually beginning 3 days PTA.  Also complains of left-sided swelling/edema, reported swelling of left side of face neck left upper extremity left lower extremity.  On exam left lower extremity edematous from hip to toes, mild edema to left side of face left neck and left arm.  Symptoms are constant and moderate in severity. No mitigating or exacerbating factors reported. Associated sxs include SOB, fever (100.6), headache, lower abdominal pain and diarrhea. Patient denies any dysuria, back pain, weakness, and all other sxs at this time. Prior Tx includes Tylenol.  In the ED, vitals:  138/66, 88, 24, 98.4 F, 95% RA.  Patient noted to have desaturations to 90-91% with talking, dyspnea with conversation.  Significant labs: HGB: 11.0, AST:  52, ALT: 56, troponin:  0.082, procalcitonin: 0.40.  CTA chest:Moderate size consolidative opacity in the right lung concerning for infection.  CXR: No acute findings.  EKG:  Negative for STEMI.  Blood cultures collected.  Unable to collect a sample from [facility].  Initiated on IV antibiotics in the ED. patient is a full code.  Placed in observation under the care of Hospital Medicine for management of pneumonia.    Overview/Hospital Course:  8-year-old female with care hospital medicine for management of pneumonia.  Continues to have mild hypoxia, with 2 L requirement for support.  Continues on IV Levaquin, added IV Diflucan today due to history of mold  exposure.  Magnesium low, repleted.  Potassium mildly low, repleted.  Possible discharge tomorrow if hypoxia resolved.    Interval History:  Added Diflucan.  Possible discharge tomorrow if hypoxia resolved.    Review of Systems   Constitutional:  Positive for activity change and fatigue. Negative for appetite change and fever.   HENT:  Positive for congestion.    Respiratory:  Positive for cough and chest tightness. Negative for shortness of breath.    Cardiovascular:  Negative for chest pain and leg swelling (Left).   Gastrointestinal:  Positive for abdominal distention.   Musculoskeletal:  Positive for arthralgias.   Neurological:  Positive for weakness.     Objective:     Vital Signs (Most Recent):  Temp: 97.9 °F (36.6 °C) (05/19/24 1308)  Pulse: 86 (05/19/24 1308)  Resp: 18 (05/19/24 1308)  BP: 132/62 (05/19/24 1308)  SpO2: (!) 83 % (05/19/24 1308) Vital Signs (24h Range):  Temp:  [97.9 °F (36.6 °C)-101 °F (38.3 °C)] 97.9 °F (36.6 °C)  Pulse:  [] 86  Resp:  [17-22] 18  SpO2:  [83 %-99 %] 83 %  BP: (108-142)/(53-65) 132/62     Weight: 116.1 kg (256 lb)  Body mass index is 40.1 kg/m².    Intake/Output Summary (Last 24 hours) at 5/19/2024 1406  Last data filed at 5/18/2024 1601  Gross per 24 hour   Intake 150 ml   Output --   Net 150 ml         Physical Exam  Vitals and nursing note reviewed.   Constitutional:       General: She is not in acute distress.     Appearance: She is well-developed. She is not ill-appearing.   HENT:      Head: Normocephalic and atraumatic.      Right Ear: Hearing and external ear normal.      Left Ear: Hearing and external ear normal.      Nose: No rhinorrhea.      Right Sinus: No maxillary sinus tenderness or frontal sinus tenderness.      Left Sinus: No maxillary sinus tenderness or frontal sinus tenderness.      Mouth/Throat:      Mouth: No oral lesions.      Pharynx: Uvula midline.   Eyes:      General:         Right eye: No discharge.         Left eye: No discharge.       Conjunctiva/sclera: Conjunctivae normal.      Pupils: Pupils are equal, round, and reactive to light.   Neck:      Thyroid: No thyromegaly.      Vascular: No carotid bruit or JVD.      Trachea: No tracheal deviation.     Cardiovascular:      Rate and Rhythm: Normal rate and regular rhythm.      Pulses:           Dorsalis pedis pulses are 2+ on the right side and 2+ on the left side.      Heart sounds: Normal heart sounds, S1 normal and S2 normal. No murmur heard.  Pulmonary:      Effort: Pulmonary effort is normal. No respiratory distress.      Breath sounds: Examination of the right-lower field reveals decreased breath sounds. Examination of the left-lower field reveals decreased breath sounds. Decreased breath sounds present.   Chest:       Abdominal:      General: Bowel sounds are normal. There is no distension.      Palpations: Abdomen is soft. There is no mass.      Tenderness: There is no abdominal tenderness.   Musculoskeletal:         General: Normal range of motion.      Cervical back: Normal range of motion.      Left lower leg: No edema.   Lymphadenopathy:      Cervical: No cervical adenopathy.      Right cervical: No superficial cervical adenopathy.     Left cervical: No superficial cervical adenopathy.      Upper Body:      Right upper body: No supraclavicular adenopathy.      Left upper body: No supraclavicular adenopathy.   Skin:     General: Skin is warm and dry.      Capillary Refill: Capillary refill takes less than 2 seconds.      Findings: No rash.   Neurological:      Mental Status: She is alert and oriented to person, place, and time.   Psychiatric:         Mood and Affect: Mood is anxious. Mood is not depressed.         Speech: Speech normal.         Behavior: Behavior normal.         Thought Content: Thought content normal.         Judgment: Judgment normal.             Significant Labs: All pertinent labs within the past 24 hours have been reviewed.  CBC:   Recent Labs   Lab 05/18/24  1055  05/19/24  1033   WBC 5.09 4.98   HGB 11.0* 11.0*   HCT 34.4* 34.8*    196     CMP:   Recent Labs   Lab 05/18/24  1055 05/19/24  1033    139   K 3.6 3.2*    103   CO2 24 23    122*   BUN 10 8   CREATININE 0.8 1.0   CALCIUM 9.2 9.2   PROT 6.6 6.8   ALBUMIN 3.5 3.4*   BILITOT 0.6 0.7   ALKPHOS 108 108   AST 52* 35   ALT 56* 41   ANIONGAP 10 13       Significant Imaging: I have reviewed all pertinent imaging results/findings within the past 24 hours.    Assessment/Plan:      * Pneumonia  Patient has a diagnosis of pneumonia. The cause of the pneumonia is suspected to be bacterial in etiology but organism is not known. The pneumonia is  new onset . The patient has the following signs/symptoms of pneumonia: cough, sputum production, and shortness of breath. The patient does not have a current oxygen requirement and the patient does not have a home oxygen requirement. I have reviewed the pertinent imaging. The following cultures have been collected: Blood cultures The culture results are listed below.     Current antimicrobial regimen consists of the antibiotics listed below. Will monitor patient closely and continue current treatment plan unchanged.    Antibiotics (From admission, onward)      Start     Stop Route Frequency Ordered    05/19/24 1400  levoFLOXacin 750 mg/150 mL IVPB 750 mg         -- IV Every 24 hours (non-standard times) 05/18/24 1435        Added Diflucan    Microbiology Results (last 7 days)       Procedure Component Value Units Date/Time    Blood culture x two cultures. Draw prior to antibiotics. [[id number]] Collected: 05/18/24 1138    Order Status: Completed Specimen: Blood from Peripheral, Forearm, Right Updated: 05/19/24 0315     Blood Culture, Routine No Growth to date    Narrative:      Aerobic and anaerobic    Blood culture x two cultures. Draw prior to antibiotics. [[id number]] Collected: 05/18/24 1055    Order Status: Completed Specimen: Blood from Peripheral,  Antecubital, Right Updated: 05/19/24 0315     Blood Culture, Routine No Growth to date    Narrative:      Aerobic and anaerobic    Culture, Respiratory with Gram Stain [[id number]] Collected: 05/18/24 1635    Order Status: Completed Specimen: Respiratory from Sputum Updated: 05/18/24 2336     Gram Stain (Respiratory) <10 epithelial cells per low power field.     Gram Stain (Respiratory) Moderate WBC's     Gram Stain (Respiratory) Few Gram positive cocci     Gram Stain (Respiratory) Rare Gram negative rods    Influenza A & B by Molecular [[id number]] Collected: 05/18/24 0946    Order Status: Completed Specimen: Nasal Swab Updated: 05/18/24 1012     Influenza A, Molecular Negative     Influenza B, Molecular Negative     Flu A & B Source Nasal swab            Lower extremity edema  Left lower extremity edema, history of lower abdominal surgeries related to ovarian cancer.  Per review of pathology report from 2019, there were no lymph nodes submitted for pathology.    --ultrasound left lower extremity-no evidence of DVT  --improving      Elevated troponin  Likely secondary to demand ischemia related to pneumonia, dyspnea.  Will rule out ACS.     --trend troponin- flat  --tele  --EKG p.r.n. chest pain/rhythm changes      Ovarian cancer, bilateral  Followed by oncology, last chemotherapy 02/2024    --follow-up Oncology      History of CHF (congestive heart failure)  Patient is identified as having Systolic (HFrEF) heart failure that is Chronic. CHF is currently controlled. Latest ECHO performed and demonstrates- Results for orders placed during the hospital encounter of 02/26/24    Echo    Interpretation Summary    Left Ventricle: The left ventricle is normal in size. Mildly increased ventricular mass. Mildly increased wall thickness. There is concentric hypertrophy. There is normal systolic function with a visually estimated ejection fraction of 55 - 60%. There is normal diastolic function.    Right Ventricle: Normal  right ventricular cavity size. Wall thickness is normal. Right ventricle wall motion  is normal. Systolic function is normal.    Pulmonic Valve: There is mild regurgitation.    IVC/SVC: Normal venous pressure at 3 mmHg.  . Continue Beta Blocker and monitor clinical status closely. Monitor on telemetry. Patient is off CHF pathway.  Monitor strict Is&Os and daily weights.  Place on fluid restriction of 1.5 L. Cardiology has not been consulted. Continue to stress to patient importance of self efficacy and  on diet for CHF. Last BNP reviewed- and noted below   Recent Labs   Lab 05/18/24  1055   BNP 21            Hypertension  Chronic, uncontrolled. Latest blood pressure and vitals reviewed-     Temp:  [97.9 °F (36.6 °C)-101 °F (38.3 °C)]   Pulse:  []   Resp:  [17-22]   BP: (108-142)/(53-65)   SpO2:  [83 %-99 %] .   Home meds for hypertension were reviewed and noted below.   Hypertension Medications               amLODIPine (NORVASC) 5 MG tablet Take 2 tablets (10 mg total) by mouth once daily.    atenoloL (TENORMIN) 25 MG tablet Take 1 tablet (25 mg total) by mouth once daily.    olmesartan-hydrochlorothiazide (BENICAR HCT) 40-25 mg per tablet Take 1 tablet by mouth once daily.    furosemide (LASIX) 40 MG tablet Take 1 tablet (40 mg total) by mouth once daily.            While in the hospital, will manage blood pressure as follows; Continue home antihypertensive regimen    Will utilize p.r.n. blood pressure medication only if patient's blood pressure greater than 160/100 and she develops symptoms such as worsening chest pain or shortness of breath.      VTE Risk Mitigation (From admission, onward)           Ordered     enoxaparin injection 40 mg  2 times daily         05/18/24 1405     IP VTE HIGH RISK PATIENT  Once         05/18/24 1405     Place sequential compression device  Until discontinued         05/18/24 1405                    Discharge Planning   EVE:      Code Status: Full Code   Is the patient  medically ready for discharge?:     Reason for patient still in hospital (select all that apply): Patient trending condition  Discharge Plan A: Home with family                  Cammy Valdes NP  Department of Hospital Medicine   [facility] - Med Surg

## 2024-05-19 NOTE — RESPIRATORY THERAPY
Home Oxygen Evaluation - [facility] - Cardiopulmonary Department      Date Performed: 2024      1) Patient's Home O2 Sat on room air, while at rest: Room Air SpO2 At Rest: (!) 88 %        If O2 sats on room air at rest are 88% or below, patient qualifies.  Document O2 liter flow needed in Step 2.  If O2 sats are 89% or above, complete Step 3.        2)  If patient is not ambulated and O2 sats are <88%, what is the O2 liter flow required to meet ordered saturation? Home O2 Eval Comments:  (Left patient on room air at this time. Patients saturations remain above 90% with deep breathing and IS in use. No respiratory distress noted. Patient has been on room air but at times decreases to 88%. Dr Birmingham notified.)    If O2 sats on room air while exercising remain 89% or above patient does not qualify, no further testing needed Document N/A in step 3. If O2 sats on room air while exercising are 88% or below, continue to Step 4.    3) Patient's O2 Sat on room air while exercisin) Patient's O2 Sat while exercising on O2: SpO2 During Ambulation on O2: 95 % at Ambulation O2 LPM: 2 LPM         (Must show improvement from #4 for patients to qualify)

## 2024-05-19 NOTE — ASSESSMENT & PLAN NOTE
Patient has a diagnosis of pneumonia. The cause of the pneumonia is suspected to be bacterial in etiology but organism is not known. The pneumonia is  new onset . The patient has the following signs/symptoms of pneumonia: cough, sputum production, and shortness of breath. The patient does not have a current oxygen requirement and the patient does not have a home oxygen requirement. I have reviewed the pertinent imaging. The following cultures have been collected: Blood cultures The culture results are listed below.     Current antimicrobial regimen consists of the antibiotics listed below. Will monitor patient closely and continue current treatment plan unchanged.    Antibiotics (From admission, onward)      Start     Stop Route Frequency Ordered    05/19/24 1400  levoFLOXacin 750 mg/150 mL IVPB 750 mg         -- IV Every 24 hours (non-standard times) 05/18/24 1435        Added Diflucan    Microbiology Results (last 7 days)       Procedure Component Value Units Date/Time    Blood culture x two cultures. Draw prior to antibiotics. [[id number]] Collected: 05/18/24 1138    Order Status: Completed Specimen: Blood from Peripheral, Forearm, Right Updated: 05/19/24 0315     Blood Culture, Routine No Growth to date    Narrative:      Aerobic and anaerobic    Blood culture x two cultures. Draw prior to antibiotics. [[id number]] Collected: 05/18/24 1055    Order Status: Completed Specimen: Blood from Peripheral, Antecubital, Right Updated: 05/19/24 0315     Blood Culture, Routine No Growth to date    Narrative:      Aerobic and anaerobic    Culture, Respiratory with Gram Stain [[id number]] Collected: 05/18/24 1635    Order Status: Completed Specimen: Respiratory from Sputum Updated: 05/18/24 2336     Gram Stain (Respiratory) <10 epithelial cells per low power field.     Gram Stain (Respiratory) Moderate WBC's     Gram Stain (Respiratory) Few Gram positive cocci     Gram Stain (Respiratory) Rare Gram negative rods    Influenza  A & B by Molecular [[id number]] Collected: 05/18/24 0946    Order Status: Completed Specimen: Nasal Swab Updated: 05/18/24 1012     Influenza A, Molecular Negative     Influenza B, Molecular Negative     Flu A & B Source Nasal swab

## 2024-05-19 NOTE — PLAN OF CARE
[facility] - Med Surg  Discharge Assessment    Primary Care Provider: Rossana Ang MD     Discharge Assessment (most recent)       BRIEF DISCHARGE ASSESSMENT - 05/19/24 5545          Discharge Planning    Assessment Type Discharge Planning Brief Assessment     Resource/Environmental Concerns none     Support Systems Spouse/significant other     Equipment Currently Used at Home none     Current Living Arrangements home     Patient/Family Anticipates Transition to home     Patient/Family Anticipated Services at Transition none     DME Needed Upon Discharge  none     Discharge Plan A Home with family     Discharge Plan B Home Health                     Independent with adls.

## 2024-05-19 NOTE — SUBJECTIVE & OBJECTIVE
Interval History:  Added Diflucan.  Possible discharge tomorrow if hypoxia resolved.    Review of Systems   Constitutional:  Positive for activity change and fatigue. Negative for appetite change and fever.   HENT:  Positive for congestion.    Respiratory:  Positive for cough and chest tightness. Negative for shortness of breath.    Cardiovascular:  Negative for chest pain and leg swelling (Left).   Gastrointestinal:  Positive for abdominal distention.   Musculoskeletal:  Positive for arthralgias.   Neurological:  Positive for weakness.     Objective:     Vital Signs (Most Recent):  Temp: 97.9 °F (36.6 °C) (05/19/24 1308)  Pulse: 86 (05/19/24 1308)  Resp: 18 (05/19/24 1308)  BP: 132/62 (05/19/24 1308)  SpO2: (!) 83 % (05/19/24 1308) Vital Signs (24h Range):  Temp:  [97.9 °F (36.6 °C)-101 °F (38.3 °C)] 97.9 °F (36.6 °C)  Pulse:  [] 86  Resp:  [17-22] 18  SpO2:  [83 %-99 %] 83 %  BP: (108-142)/(53-65) 132/62     Weight: 116.1 kg (256 lb)  Body mass index is 40.1 kg/m².    Intake/Output Summary (Last 24 hours) at 5/19/2024 1406  Last data filed at 5/18/2024 1601  Gross per 24 hour   Intake 150 ml   Output --   Net 150 ml         Physical Exam  Vitals and nursing note reviewed.   Constitutional:       General: She is not in acute distress.     Appearance: She is well-developed. She is not ill-appearing.   HENT:      Head: Normocephalic and atraumatic.      Right Ear: Hearing and external ear normal.      Left Ear: Hearing and external ear normal.      Nose: No rhinorrhea.      Right Sinus: No maxillary sinus tenderness or frontal sinus tenderness.      Left Sinus: No maxillary sinus tenderness or frontal sinus tenderness.      Mouth/Throat:      Mouth: No oral lesions.      Pharynx: Uvula midline.   Eyes:      General:         Right eye: No discharge.         Left eye: No discharge.      Conjunctiva/sclera: Conjunctivae normal.      Pupils: Pupils are equal, round, and reactive to light.   Neck:      Thyroid: No  thyromegaly.      Vascular: No carotid bruit or JVD.      Trachea: No tracheal deviation.     Cardiovascular:      Rate and Rhythm: Normal rate and regular rhythm.      Pulses:           Dorsalis pedis pulses are 2+ on the right side and 2+ on the left side.      Heart sounds: Normal heart sounds, S1 normal and S2 normal. No murmur heard.  Pulmonary:      Effort: Pulmonary effort is normal. No respiratory distress.      Breath sounds: Examination of the right-lower field reveals decreased breath sounds. Examination of the left-lower field reveals decreased breath sounds. Decreased breath sounds present.   Chest:       Abdominal:      General: Bowel sounds are normal. There is no distension.      Palpations: Abdomen is soft. There is no mass.      Tenderness: There is no abdominal tenderness.   Musculoskeletal:         General: Normal range of motion.      Cervical back: Normal range of motion.      Left lower leg: No edema.   Lymphadenopathy:      Cervical: No cervical adenopathy.      Right cervical: No superficial cervical adenopathy.     Left cervical: No superficial cervical adenopathy.      Upper Body:      Right upper body: No supraclavicular adenopathy.      Left upper body: No supraclavicular adenopathy.   Skin:     General: Skin is warm and dry.      Capillary Refill: Capillary refill takes less than 2 seconds.      Findings: No rash.   Neurological:      Mental Status: She is alert and oriented to person, place, and time.   Psychiatric:         Mood and Affect: Mood is anxious. Mood is not depressed.         Speech: Speech normal.         Behavior: Behavior normal.         Thought Content: Thought content normal.         Judgment: Judgment normal.             Significant Labs: All pertinent labs within the past 24 hours have been reviewed.  CBC:   Recent Labs   Lab 05/18/24  1055 05/19/24  1033   WBC 5.09 4.98   HGB 11.0* 11.0*   HCT 34.4* 34.8*    196     CMP:   Recent Labs   Lab 05/18/24  1055  05/19/24  1033    139   K 3.6 3.2*    103   CO2 24 23    122*   BUN 10 8   CREATININE 0.8 1.0   CALCIUM 9.2 9.2   PROT 6.6 6.8   ALBUMIN 3.5 3.4*   BILITOT 0.6 0.7   ALKPHOS 108 108   AST 52* 35   ALT 56* 41   ANIONGAP 10 13       Significant Imaging: I have reviewed all pertinent imaging results/findings within the past 24 hours.

## 2024-05-19 NOTE — ASSESSMENT & PLAN NOTE
Likely secondary to demand ischemia related to pneumonia, dyspnea.  Will rule out ACS.     --trend troponin- flat  --tele  --EKG p.r.n. chest pain/rhythm changes

## 2024-05-19 NOTE — ASSESSMENT & PLAN NOTE
Chronic, uncontrolled. Latest blood pressure and vitals reviewed-     Temp:  [97.9 °F (36.6 °C)-101 °F (38.3 °C)]   Pulse:  []   Resp:  [17-22]   BP: (108-142)/(53-65)   SpO2:  [83 %-99 %] .   Home meds for hypertension were reviewed and noted below.   Hypertension Medications               amLODIPine (NORVASC) 5 MG tablet Take 2 tablets (10 mg total) by mouth once daily.    atenoloL (TENORMIN) 25 MG tablet Take 1 tablet (25 mg total) by mouth once daily.    olmesartan-hydrochlorothiazide (BENICAR HCT) 40-25 mg per tablet Take 1 tablet by mouth once daily.    furosemide (LASIX) 40 MG tablet Take 1 tablet (40 mg total) by mouth once daily.            While in the hospital, will manage blood pressure as follows; Continue home antihypertensive regimen    Will utilize p.r.n. blood pressure medication only if patient's blood pressure greater than 160/100 and she develops symptoms such as worsening chest pain or shortness of breath.

## 2024-05-19 NOTE — ASSESSMENT & PLAN NOTE
Patient is identified as having Systolic (HFrEF) heart failure that is Chronic. CHF is currently controlled. Latest ECHO performed and demonstrates- Results for orders placed during the hospital encounter of 02/26/24    Echo    Interpretation Summary    Left Ventricle: The left ventricle is normal in size. Mildly increased ventricular mass. Mildly increased wall thickness. There is concentric hypertrophy. There is normal systolic function with a visually estimated ejection fraction of 55 - 60%. There is normal diastolic function.    Right Ventricle: Normal right ventricular cavity size. Wall thickness is normal. Right ventricle wall motion  is normal. Systolic function is normal.    Pulmonic Valve: There is mild regurgitation.    IVC/SVC: Normal venous pressure at 3 mmHg.  . Continue Beta Blocker and monitor clinical status closely. Monitor on telemetry. Patient is off CHF pathway.  Monitor strict Is&Os and daily weights.  Place on fluid restriction of 1.5 L. Cardiology has not been consulted. Continue to stress to patient importance of self efficacy and  on diet for CHF. Last BNP reviewed- and noted below   Recent Labs   Lab 05/18/24  1055   BNP 21

## 2024-05-19 NOTE — ASSESSMENT & PLAN NOTE
Left lower extremity edema, history of lower abdominal surgeries related to ovarian cancer.  Per review of pathology report from 2019, there were no lymph nodes submitted for pathology.    --ultrasound left lower extremity-no evidence of DVT  --improving

## 2024-05-19 NOTE — HOSPITAL COURSE
68-year-old female with [facility] medicine for management of pneumonia.  Continues to have mild hypoxia, with 2 L requirement for support.  Continues on IV Levaquin, added IV Diflucan today due to history of mold exposure.  Magnesium low, repleted.  Potassium mildly low, repleted.  Possible discharge tomorrow if hypoxia resolved.    5/20- looks and feels lot better, no SOB, ambulating well on RA with Sats 93%. Cough, chest congestion much better. She is eating drinking well, walking around well. She was seen and examined and deemed stable for discharge home today.

## 2024-05-20 ENCOUNTER — PATIENT MESSAGE (OUTPATIENT)
Dept: PULMONOLOGY | Facility: CLINIC | Age: 69
End: 2024-05-20
Payer: MEDICARE

## 2024-05-20 VITALS
HEART RATE: 65 BPM | DIASTOLIC BLOOD PRESSURE: 57 MMHG | OXYGEN SATURATION: 94 % | WEIGHT: 256 LBS | TEMPERATURE: 98 F | SYSTOLIC BLOOD PRESSURE: 117 MMHG | BODY MASS INDEX: 40.18 KG/M2 | HEIGHT: 67 IN | RESPIRATION RATE: 16 BRPM

## 2024-05-20 DIAGNOSIS — G47.33 OSA (OBSTRUCTIVE SLEEP APNEA): Primary | ICD-10-CM

## 2024-05-20 PROBLEM — R79.89 ELEVATED TROPONIN: Status: RESOLVED | Noted: 2024-05-18 | Resolved: 2024-05-20

## 2024-05-20 PROBLEM — R60.0 LOWER EXTREMITY EDEMA: Status: RESOLVED | Noted: 2024-05-18 | Resolved: 2024-05-20

## 2024-05-20 LAB
ALBUMIN SERPL BCP-MCNC: 3.2 G/DL (ref 3.5–5.2)
ALP SERPL-CCNC: 104 U/L (ref 55–135)
ALT SERPL W/O P-5'-P-CCNC: 45 U/L (ref 10–44)
ANION GAP SERPL CALC-SCNC: 7 MMOL/L (ref 8–16)
AST SERPL-CCNC: 41 U/L (ref 10–40)
BASOPHILS # BLD AUTO: 0.03 K/UL (ref 0–0.2)
BASOPHILS NFR BLD: 0.6 % (ref 0–1.9)
BILIRUB SERPL-MCNC: 0.4 MG/DL (ref 0.1–1)
BUN SERPL-MCNC: 10 MG/DL (ref 8–23)
CALCIUM SERPL-MCNC: 8.8 MG/DL (ref 8.7–10.5)
CHLORIDE SERPL-SCNC: 108 MMOL/L (ref 95–110)
CO2 SERPL-SCNC: 26 MMOL/L (ref 23–29)
CREAT SERPL-MCNC: 0.8 MG/DL (ref 0.5–1.4)
DIFFERENTIAL METHOD BLD: ABNORMAL
EOSINOPHIL # BLD AUTO: 0.2 K/UL (ref 0–0.5)
EOSINOPHIL NFR BLD: 4.1 % (ref 0–8)
ERYTHROCYTE [DISTWIDTH] IN BLOOD BY AUTOMATED COUNT: 14.7 % (ref 11.5–14.5)
EST. GFR  (NO RACE VARIABLE): >60 ML/MIN/1.73 M^2
GLUCOSE SERPL-MCNC: 106 MG/DL (ref 70–110)
HCT VFR BLD AUTO: 33.9 % (ref 37–48.5)
HGB BLD-MCNC: 10.8 G/DL (ref 12–16)
IMM GRANULOCYTES # BLD AUTO: 0.01 K/UL (ref 0–0.04)
IMM GRANULOCYTES NFR BLD AUTO: 0.2 % (ref 0–0.5)
LYMPHOCYTES # BLD AUTO: 1.3 K/UL (ref 1–4.8)
LYMPHOCYTES NFR BLD: 26.4 % (ref 18–48)
MAGNESIUM SERPL-MCNC: 1.7 MG/DL (ref 1.6–2.6)
MCH RBC QN AUTO: 28.9 PG (ref 27–31)
MCHC RBC AUTO-ENTMCNC: 31.9 G/DL (ref 32–36)
MCV RBC AUTO: 91 FL (ref 82–98)
MONOCYTES # BLD AUTO: 0.5 K/UL (ref 0.3–1)
MONOCYTES NFR BLD: 9.1 % (ref 4–15)
NEUTROPHILS # BLD AUTO: 3 K/UL (ref 1.8–7.7)
NEUTROPHILS NFR BLD: 59.6 % (ref 38–73)
NRBC BLD-RTO: 0 /100 WBC
PHOSPHATE SERPL-MCNC: 3 MG/DL (ref 2.7–4.5)
PLATELET # BLD AUTO: 192 K/UL (ref 150–450)
PMV BLD AUTO: 9.5 FL (ref 9.2–12.9)
POTASSIUM SERPL-SCNC: 4.1 MMOL/L (ref 3.5–5.1)
PROT SERPL-MCNC: 5.9 G/DL (ref 6–8.4)
RBC # BLD AUTO: 3.74 M/UL (ref 4–5.4)
SODIUM SERPL-SCNC: 141 MMOL/L (ref 136–145)
WBC # BLD AUTO: 5.07 K/UL (ref 3.9–12.7)

## 2024-05-20 PROCEDURE — 83735 ASSAY OF MAGNESIUM: CPT | Performed by: NURSE PRACTITIONER

## 2024-05-20 PROCEDURE — 85025 COMPLETE CBC W/AUTO DIFF WBC: CPT | Performed by: NURSE PRACTITIONER

## 2024-05-20 PROCEDURE — 94760 N-INVAS EAR/PLS OXIMETRY 1: CPT

## 2024-05-20 PROCEDURE — 25000242 PHARM REV CODE 250 ALT 637 W/ HCPCS: Performed by: NURSE PRACTITIONER

## 2024-05-20 PROCEDURE — 84100 ASSAY OF PHOSPHORUS: CPT | Performed by: NURSE PRACTITIONER

## 2024-05-20 PROCEDURE — 80053 COMPREHEN METABOLIC PANEL: CPT | Performed by: NURSE PRACTITIONER

## 2024-05-20 PROCEDURE — 94640 AIRWAY INHALATION TREATMENT: CPT

## 2024-05-20 PROCEDURE — 25000003 PHARM REV CODE 250: Performed by: NURSE PRACTITIONER

## 2024-05-20 PROCEDURE — 36415 COLL VENOUS BLD VENIPUNCTURE: CPT | Performed by: NURSE PRACTITIONER

## 2024-05-20 RX ORDER — FLUCONAZOLE 200 MG/1
200 TABLET ORAL DAILY
Qty: 5 TABLET | Refills: 0 | Status: SHIPPED | OUTPATIENT
Start: 2024-05-20 | End: 2024-05-25

## 2024-05-20 RX ORDER — GABAPENTIN 300 MG/1
600 CAPSULE ORAL 3 TIMES DAILY
Qty: 180 CAPSULE | Refills: 3 | Status: SHIPPED | OUTPATIENT
Start: 2024-05-20 | End: 2024-09-17

## 2024-05-20 RX ORDER — BENZONATATE 100 MG/1
100 CAPSULE ORAL 3 TIMES DAILY PRN
Qty: 30 CAPSULE | Refills: 0 | Status: SHIPPED | OUTPATIENT
Start: 2024-05-20 | End: 2024-05-30

## 2024-05-20 RX ORDER — LEVOFLOXACIN 750 MG/1
750 TABLET ORAL DAILY
Qty: 5 TABLET | Refills: 0 | Status: SHIPPED | OUTPATIENT
Start: 2024-05-20 | End: 2024-05-30

## 2024-05-20 RX ORDER — ALBUTEROL SULFATE 90 UG/1
2 AEROSOL, METERED RESPIRATORY (INHALATION) EVERY 4 HOURS PRN
Qty: 18 G | Refills: 1 | Status: SHIPPED | OUTPATIENT
Start: 2024-05-20 | End: 2025-05-20

## 2024-05-20 RX ADMIN — SERTRALINE HYDROCHLORIDE 25 MG: 25 TABLET ORAL at 08:05

## 2024-05-20 RX ADMIN — ATORVASTATIN CALCIUM 40 MG: 40 TABLET, FILM COATED ORAL at 08:05

## 2024-05-20 RX ADMIN — ATENOLOL 25 MG: 25 TABLET ORAL at 08:05

## 2024-05-20 RX ADMIN — IPRATROPIUM BROMIDE AND ALBUTEROL SULFATE 3 ML: 2.5; .5 SOLUTION RESPIRATORY (INHALATION) at 07:05

## 2024-05-20 RX ADMIN — GABAPENTIN 600 MG: 300 CAPSULE ORAL at 08:05

## 2024-05-20 NOTE — PLAN OF CARE
[facility] - Med Surg  Discharge Final Note    Primary Care Provider: Rossana Ang MD    Expected Discharge Date: 5/20/2024    Final Discharge Note (most recent)       Final Note - 05/20/24 1214          Final Note    Assessment Type Final Discharge Note     Anticipated Discharge Disposition Home or Self Care        Post-Acute Status    Discharge Delays None known at this time                     Important Message from Medicare  Important Message from Medicare regarding Discharge Appeal Rights: Given to patient/caregiver, Explained to patient/caregiver, Signed/date by patient/caregiver     Date IMM was signed: 05/20/24  Time IMM was signed: 0805    Contact Info       Rossana Ang MD   Specialty: Family Medicine   Relationship: PCP - General  Hypertension Digital Medicine Responsible Provider    [address] [facility] [address]   Phone: [phone]       Next Steps: Schedule an appointment as soon as possible for a visit in 3 day(s)    Instructions: Hospital follow up          Discharge home, no home health or dme orders noted.   PCP appt scheduled and added to AVS.

## 2024-05-20 NOTE — PROGRESS NOTES
Discharge education given to patient. Patient verbalized an understanding. IV removed, catheter intact. Patient to be discharged with Rx meds (picking up curbside) and personal belongings. Patient son present to bring her home.

## 2024-05-20 NOTE — PLAN OF CARE
Pt ambulates independently. Pt denies SOB, O2 saturation above 93% on RA. NADN. Chart check complete.     Problem: Adult Inpatient Plan of Care  Goal: Plan of Care Review  Outcome: Progressing     Problem: Adult Inpatient Plan of Care  Goal: Patient-Specific Goal (Individualized)  Outcome: Progressing     Problem: Adult Inpatient Plan of Care  Goal: Absence of Hospital-Acquired Illness or Injury  Outcome: Progressing

## 2024-05-21 ENCOUNTER — TELEPHONE (OUTPATIENT)
Dept: INTERNAL MEDICINE | Facility: CLINIC | Age: 69
End: 2024-05-21
Payer: MEDICARE

## 2024-05-21 LAB
BACTERIA SPEC AEROBE CULT: NORMAL
BACTERIA SPEC AEROBE CULT: NORMAL
GRAM STN SPEC: NORMAL

## 2024-05-21 NOTE — TELEPHONE ENCOUNTER
----- Message from Kaylah Warner sent at 5/21/2024  4:08 PM CDT -----  Contact: Casie Rolle is calling in regards to her appt on 05/28 she would prefer to see Dr. Rossana Ang.please call back at .[phone]           Thanks  JERMAINE   Refill for Ventolin inhaler was sent to [facility] pharmacy [facility] in [place] on 01/02/2020. Closing encounter.

## 2024-05-21 NOTE — TELEPHONE ENCOUNTER
----- Message from Celestina Fuller sent at 5/21/2024  3:41 PM CDT -----  Contact: self  [phone]  Pt called in to schedule hospital f/u . Pt was discharged yesterday 5/20/2024  I wasn't able to see any upcoming appts within the timeframe . Please call back  [phone] thanks lay

## 2024-05-21 NOTE — TELEPHONE ENCOUNTER
Patient has an appt with Cathy Echeverria NP on 05/28/2024 tried to call patient but she does not have a voicemail set up

## 2024-05-23 ENCOUNTER — OFFICE VISIT (OUTPATIENT)
Dept: CARDIOLOGY | Facility: CLINIC | Age: 69
End: 2024-05-23
Payer: MEDICARE

## 2024-05-23 VITALS
SYSTOLIC BLOOD PRESSURE: 110 MMHG | DIASTOLIC BLOOD PRESSURE: 62 MMHG | BODY MASS INDEX: 39.76 KG/M2 | OXYGEN SATURATION: 94 % | WEIGHT: 253.31 LBS | HEART RATE: 82 BPM | HEIGHT: 67 IN

## 2024-05-23 DIAGNOSIS — I10 PRIMARY HYPERTENSION: ICD-10-CM

## 2024-05-23 DIAGNOSIS — R22.42 LOCALIZED SWELLING OF LEFT LOWER LEG: Primary | ICD-10-CM

## 2024-05-23 DIAGNOSIS — E78.2 MIXED HYPERLIPIDEMIA: ICD-10-CM

## 2024-05-23 DIAGNOSIS — R74.8 ELEVATED LIVER ENZYMES: ICD-10-CM

## 2024-05-23 DIAGNOSIS — E66.01 SEVERE OBESITY (BMI 35.0-39.9) WITH COMORBIDITY: ICD-10-CM

## 2024-05-23 DIAGNOSIS — C56.3 OVARIAN CANCER, BILATERAL: ICD-10-CM

## 2024-05-23 DIAGNOSIS — I50.9 CONGESTIVE HEART FAILURE, UNSPECIFIED HF CHRONICITY, UNSPECIFIED HEART FAILURE TYPE: ICD-10-CM

## 2024-05-23 DIAGNOSIS — R06.02 SOB (SHORTNESS OF BREATH): ICD-10-CM

## 2024-05-23 DIAGNOSIS — R94.31 ABNORMAL ECG: ICD-10-CM

## 2024-05-23 DIAGNOSIS — M50.30 DDD (DEGENERATIVE DISC DISEASE), CERVICAL: ICD-10-CM

## 2024-05-23 LAB
BACTERIA BLD CULT: NORMAL
BACTERIA BLD CULT: NORMAL

## 2024-05-23 PROCEDURE — 99214 OFFICE O/P EST MOD 30 MIN: CPT | Mod: S$PBB,HCNC,, | Performed by: STUDENT IN AN ORGANIZED HEALTH CARE EDUCATION/TRAINING PROGRAM

## 2024-05-23 PROCEDURE — 99999 PR PBB SHADOW E&M-EST. PATIENT-LVL III: CPT | Mod: PBBFAC,,, | Performed by: STUDENT IN AN ORGANIZED HEALTH CARE EDUCATION/TRAINING PROGRAM

## 2024-05-23 NOTE — PROGRESS NOTES
Section of Cardiology                  Cardiac Clinic Note    Chief Complaint/Reason for consultation: shortness of breath       HPI:   Casie Gaines is a 68 y.o. female with h/o ovarian cancer s/p radiation/chemo/surgery, hypertension, obesity who was referred to cardiology clinic by Dr. Pugh for evaluation.        2/28/24  Has been having Sob since 9/23-10/23  Had chemo/radiation/surgery for ovarian cancer x 2   Has been retaining fluid, LE swelling  Had to go to the ED, received IV lasix  Was just started on lasix at home- thinks SOB has gotten worse    Wearing compression stockings  Watching salt intake, but eats shellfish and fried fish, drinking lost sodas  November, had lower extremity left leg venous ultrasound with no DVT  Echo 2/24 EF 55-60%, normal diastolic function, concentric hypertrophy, mild MR    Stopped chemo 2 weeks ago     Stopped smoking 2019 5/23/24  Doing well, finished chem/radiation  BP stable   Reports left sided pain- thinks it's due to her kidney, had a sten removed int the past   No N/V   Doing PT for back pain  Recently admitted for SOB, treated for PNA (reports going into an old house that had mold)  Was told she has JERALD, but wants to lose weight with ozempic  Swelling improved  Taking lasix prn    EKG 5/18/24 NSR, nonspecific T wave abn   EKG 2/24/24 NSR, no acute ST - T wave changes    ECHO  Results for orders placed during the hospital encounter of 02/26/24    Echo    Interpretation Summary    Left Ventricle: The left ventricle is normal in size. Mildly increased ventricular mass. Mildly increased wall thickness. There is concentric hypertrophy. There is normal systolic function with a visually estimated ejection fraction of 55 - 60%. There is normal diastolic function.    Right Ventricle: Normal right ventricular cavity size. Wall thickness is normal. Right ventricle wall motion  is normal. Systolic function is normal.    Pulmonic Valve: There is  mild regurgitation.    IVC/SVC: Normal venous pressure at 3 mmHg.       STRESS TEST No results found for this or any previous visit.       LHC No results found for this or any previous visit.            ROS: All 10 systems reviewed. Please refer to the HPI for pertinent positives. All other systems negative.     Past Medical History  Past Medical History:   Diagnosis Date    Anemia     Anxiety     Arthritis     knees    Cancer     ovarian    CHF (congestive heart failure)     Hx antineoplastic chemotherapy     last 2019    Hyperlipemia     Hypertension     Neck pain     Ovarian cancer 2019    CHEMO    Peritoneal carcinomatosis 2019       Surgical History  Past Surgical History:   Procedure Laterality Date    breast reduction  10/02/2018    CATARACT EXTRACTION Bilateral     2023     SECTION      X 1    COLONOSCOPY N/A 2021    Procedure: COLONOSCOPY;  Surgeon: Paulette Rojas MD;  Location: [facility] ENDO;  Service: Gastroenterology;  Laterality: N/A;    CYSTOSCOPY W/ URETERAL STENT PLACEMENT Right 2019    Procedure: CYSTOSCOPY, WITH URETERAL STENT INSERTION;  Surgeon: Timmy Santiago IV, MD;  Location: [facility] OR;  Service: Urology;  Laterality: Right;    CYSTOSCOPY W/ URETERAL STENT REMOVAL  10/04/2019    DILATION AND CURETTAGE OF UTERUS      HYSTERECTOMY      RALH for fibroids (still has ovaries)    INSERTION OF VENOUS ACCESS PORT Left 2019    Procedure: INSERTION, VENOUS ACCESS PORT;  Surgeon: Ulisses Monzon MD;  Location: [facility] OR;  Service: General;  Laterality: Left;  Left internal jugular     LYSIS OF ADHESIONS OF URETER N/A 08/15/2019    Procedure: URETEROLYSIS;  Surgeon: Ismael Juarez MD;  Location: [facility] OR;  Service: OB/GYN;  Laterality: N/A;    OMENTECTOMY N/A 08/15/2019    Procedure: OMENTECTOMY;  Surgeon: Ismael Juarez MD;  Location: [facility] OR;  Service: OB/GYN;  Laterality: N/A;    RETROGRADE PYELOGRAPHY Right 2019    Procedure: PYELOGRAM, RETROGRADE;   Surgeon: Timmy Santiago IV, MD;  Location: [facility] OR;  Service: Urology;  Laterality: Right;    ROBOT-ASSISTED LAPAROSCOPIC SALPINGO-OOPHORECTOMY USING DA TIA XI Bilateral 08/15/2019    Procedure: XI ROBOTIC SALPINGO-OOPHORECTOMY;  Surgeon: Ismael Juarez MD;  Location: [facility] OR;  Service: OB/GYN;  Laterality: Bilateral;    TOTAL REDUCTION MAMMOPLASTY  2018    TUBAL LIGATION            Allergies:   Review of patient's allergies indicates:  No Known Allergies    Social History:  Social History     Socioeconomic History    Marital status:    Tobacco Use    Smoking status: Former     Current packs/day: 0.00     Average packs/day: 1 pack/day for 25.0 years (25.0 ttl pk-yrs)     Types: Cigarettes     Start date: 10/1/1993     Quit date: 10/1/2018     Years since quittin.6    Smokeless tobacco: Never    Tobacco comments:     States started quit 2 months ago after 30 years   Substance and Sexual Activity    Alcohol use: Yes     Comment: occasionally  No alcohol 72h prior to sx    Drug use: No    Sexual activity: Not Currently     Partners: Male     Comment: hyst; mut monog   Social History Narrative    Live w/ spouse and 2 dogs      Social Determinants of Health     Financial Resource Strain: Low Risk  (2023)    Overall Financial Resource Strain (CARDIA)     Difficulty of Paying Living Expenses: Not hard at all   Food Insecurity: No Food Insecurity (2023)    Hunger Vital Sign     Worried About Running Out of Food in the Last Year: Never true     Ran Out of Food in the Last Year: Never true   Transportation Needs: No Transportation Needs (2023)    PRAPARE - Transportation     Lack of Transportation (Medical): No     Lack of Transportation (Non-Medical): No   Physical Activity: Unknown (2023)    Exercise Vital Sign     Days of Exercise per Week: 0 days   Stress: No Stress Concern Present (2023)    [employer] - Occupational Stress Questionnaire      Feeling of Stress : Not at all   Housing Stability: Unknown (11/30/2023)    Housing Stability Vital Sign     Unable to Pay for Housing in the Last Year: No     Unstable Housing in the Last Year: No       Family History:  family history includes Anesthesia problems in an other family member; Breast cancer in her maternal aunt and paternal aunt; Colon cancer in her brother; Glaucoma in her mother; No Known Problems in her father; Ovarian cancer in her paternal aunt.    Home Medications:  Current Outpatient Medications on File Prior to Visit   Medication Sig Dispense Refill    albuterol (PROVENTIL/VENTOLIN HFA) 90 mcg/actuation inhaler Inhale 2 puffs into the lungs every 4 (four) hours as needed for Wheezing. Rescue 18 g 1    ALPRAZolam (XANAX) 0.25 MG tablet Take 1 tablet (0.25 mg total) by mouth 3 (three) times daily as needed for Anxiety. 60 tablet 2    amLODIPine (NORVASC) 5 MG tablet Take 2 tablets (10 mg total) by mouth once daily. 180 tablet 3    atenoloL (TENORMIN) 25 MG tablet Take 1 tablet (25 mg total) by mouth once daily. 90 tablet 2    benzonatate (TESSALON) 100 MG capsule Take 1 capsule (100 mg total) by mouth 3 (three) times daily as needed for Cough. 30 capsule 0    biotin 1 mg tablet Take 1,000 mcg by mouth once daily.       cetirizine (ZYRTEC) 10 MG tablet Take 1 tablet (10 mg total) by mouth once daily. 30 tablet 5    diclofenac sodium (VOLTAREN) 1 % Gel Apply once a day to back as needed 100 g 1    EPINEPHrine (EPIPEN) 0.3 mg/0.3 mL AtIn Inject 0.3 mLs (0.3 mg total) into the muscle once. for 1 dose 2 each 0    fluconazole (DIFLUCAN) 200 MG Tab Take 1 tablet (200 mg total) by mouth once daily. for 5 days 5 tablet 0    fluticasone propionate (FLONASE) 50 mcg/actuation nasal spray 1 spray (50 mcg total) by Each Nostril route every 12 (twelve) hours as needed for Rhinitis. 16 g 0    furosemide (LASIX) 40 MG tablet Take 1 tablet (40 mg total) by mouth once daily. (Patient taking differently: Take 40 mg  "by mouth daily as needed.) 30 tablet 11    gabapentin (NEURONTIN) 300 MG capsule Take 2 capsules (600 mg total) by mouth 3 (three) times daily. 180 capsule 3    ginkgo biloba 40 mg Tab Take 40 mg by mouth.      levoFLOXacin (LEVAQUIN) 750 MG tablet Take 1 tablet (750 mg total) by mouth once daily. 5 tablet 0    multivitamin with minerals tablet Take 1 tablet by mouth once daily.       olmesartan-hydrochlorothiazide (BENICAR HCT) 40-25 mg per tablet Take 1 tablet by mouth once daily. 90 tablet 1    potassium chloride SA (K-DUR,KLOR-CON) 20 MEQ tablet Take 1 tablet (20 mEq total) by mouth 2 (two) times daily. 2 tablet 0    rosuvastatin (CRESTOR) 10 MG tablet Take 1 tablet (10 mg total) by mouth once daily. 90 tablet 1    sertraline (ZOLOFT) 25 MG tablet Take 1 tablet (25 mg total) by mouth once daily. 90 tablet 0     No current facility-administered medications on file prior to visit.       Physical exam:  /62 (BP Location: Left arm, Patient Position: Sitting, BP Method: Medium (Manual))   Pulse 82   Ht 5' 7" (1.702 m)   Wt 114.9 kg (253 lb 4.9 oz)   SpO2 (!) 94%   BMI 39.67 kg/m²         General: Pt is a 68 y.o. year old female who is AAOx3, in NAD, is pleasant, well nourished, looks stated age  HEENT: PERRL, EOMI, Oral mucosa pink & moist  CVS  No abnormal cardiac pulsations noted on inspection. JVP not raised. The apical impulse is normal on palpation, and is located in the left 5th intercostal space in the mid - clavicular line. No palpable thrills or abnormal pulsations noted. RR, S1 - S2 heard, no murmurs, rubs or gallops appreciated.   PUL : CTA B/L. No wheezes/crackles heard   ABD : BS +, soft. No tenderness elicited   LE :  Trace edema bilateral, left leg bigger than right Distal Pulses palpable B/L         LABS:    Chemistry:   Lab Results   Component Value Date     05/20/2024    K 4.1 05/20/2024     05/20/2024    CO2 26 05/20/2024    BUN 10 05/20/2024    CREATININE 0.8 05/20/2024    " CALCIUM 8.8 05/20/2024     Cardiac Markers:   Lab Results   Component Value Date    TROPONINI 0.032 (H) 05/18/2024     Cardiac Markers (Last 3):   Lab Results   Component Value Date    TROPONINI 0.032 (H) 05/18/2024    TROPONINI 0.017 05/18/2024    TROPONINI 0.082 (H) 05/18/2024     CBC:   Lab Results   Component Value Date    WBC 5.07 05/20/2024    HGB 10.8 (L) 05/20/2024    HCT 33.9 (L) 05/20/2024    MCV 91 05/20/2024     05/20/2024     Lipids:   Lab Results   Component Value Date    CHOL 163 01/06/2022    TRIG 71 01/06/2022    HDL 51 01/06/2022     Coagulation:   Lab Results   Component Value Date    INR 1.0 01/28/2019    APTT 27.6 01/28/2019           Assessment        1. Localized swelling of left lower leg    2. Congestive heart failure, unspecified HF chronicity, unspecified heart failure type    3. DDD (degenerative disc disease), cervical    4. Primary hypertension    5. Mixed hyperlipidemia    6. Abnormal ECG    7. Ovarian cancer, bilateral    8. SOB (shortness of breath)    9. Severe obesity (BMI 35.0-39.9) with comorbidity           Plan:    Lower extremity swelling-  improved   Takes lasix prn, low potassium (not taking potassium supplements regularly because it makes her urinate)  Echo 2/24 EF 55-60%, normal diastolic function, concentric hypertrophy, mild MR    DJD   Stable   Continue p.r.n. pain medications     HLD  LDL 98 as of 1/22  Continue Crestor- LFTs elevated (check CMP in 1 week)  PCP to check     Ovarian cancer  S/p radiation/chemo/surgery    Hypertension  Stable   Continue amlodipine, olmesartan/HCTZ, atenolol    Obesity, Body mass index is 39.67 kg/m².   Low salt, low fat diet  Exercise as tolerated, at least 30 min daily     This note was prepared using voice recognition system and is likely to have sound alike errors that may have been overlooked even after proofreading.     I have reviewed all pertinent chart information.  Plans and recommendations have been formulated under my  direct supervision. All questions answered and patient voiced understanding.   If symptoms persist go to the ED.    RTC in 6 m        India Ferrara MD  Cardiology

## 2024-05-23 NOTE — ASSESSMENT & PLAN NOTE
- Recommend benadryl IV prior to each medication   - Ordered Hydroxyzine 25 mg PRN for pruritus  - Discussed planning with patient   
HPI: 59-year-old male with PMHx of HTN, HLD, melanoma x3, glioblastoma with redo R crani for tumor resection on 2/14/2024, presents complaining of dizziness.  Son providing collateral information at bedside states patient has been complaining of worse headache since 1 AM.  Last time patient experienced this extent of symptoms he required resection as reported by son.  No relief with OTC Tylenol last dose 4 AM.  Patient endorsing diplopia when looking upwards and outwards bilaterally and mild gait mild instability with ambulation when compared to baseline.  Denies any loss of function or sensation.  No recent fevers or chills.  No other complaints at this time.    ROS:   General: No fever, no chills, no malaise, no fatigue  ENT: No earache, no coryza, no sore throat  Neck: No stiffness, no swollen glands, no dysphagia  Respiratory: No cough, no dyspnea, no pleuritic chest pain  Cardiac: no chest pain, no palpitations, no edema, no jvd  Abdomen: No abdominal pain, no nausea, no vomiting, no diarrhea  Musculoskeletal: No myalgia, no arthralgia  Neurologic: See HPI  Skin: No rash, no evidence of trauma  All other ROS are negative    PE:  General: NAD; well appearing; A&O x3   Head: NC/AT  Eyes: PERRL, EOMI  ENT: Airway patent, mmm.  Abdomen: +BS, ND, NT, soft, no guarding, no rebound, no masses , no rigidity  Extremities: FROM, no edema, 5/5 strength in b/l UE and LE  Skin: no rash or bruising  Neurologic: alert, speech clear, no focal deficits, CN II-XII grossly intact, normal gait, sensation grossly intact  Psych: nl mood/affect, nl insight.    MDM: : 59-year-old male with PMHx of HTN, HLD, melanoma x3, glioblastoma with redo R crani for tumor resection on 2/14/2024, presents complaining of dizziness.  Possible atypical migraine versus resurgence of GBM versus symptoms caused by known encephalomalacia and gliosis as noted on the last noncontrast CT head of May 6, 2024 as per independent chart review.  Will obtain CBC, CMP for infectious and metabolic screening, obtain CT head with IV contrast.  Symptomatic control as needed.  Disposition pending results, likely outpatient follow-up.

## 2024-05-24 ENCOUNTER — NURSE TRIAGE (OUTPATIENT)
Dept: ADMINISTRATIVE | Facility: CLINIC | Age: 69
End: 2024-05-24
Payer: MEDICARE

## 2024-05-24 ENCOUNTER — PATIENT OUTREACH (OUTPATIENT)
Dept: ADMINISTRATIVE | Facility: CLINIC | Age: 69
End: 2024-05-24
Payer: MEDICARE

## 2024-05-24 NOTE — PROGRESS NOTES
C3 nurse spoke with Casie Gaines for a TCC post hospital discharge follow up call. The patient has a scheduled HOSFU appointment with Cathy Echeverria NP on 05/28/24 @ 1300. Call transferred to Angelique, triage RN for escalation of symptoms.

## 2024-05-24 NOTE — TELEPHONE ENCOUNTER
Post discharge pt. C/o intermittent left lower quad abdominal pain 3-4/10 for 3 weeks. Pt states that pain can become sharp and gradually go away. Advised to be seen per protocol. Unable to schedule appt per protocol. VU. Pt states that she went to  and was unable to be seen if she didn't make payment at the time of visit. Encounter route to provider for appt per protocol.      Reason for Disposition   MODERATE pain (e.g., interferes with normal activities that comes and goes (cramps) lasts > 24 hours  (Exception: Pain with Vomiting or Diarrhea - see that Protocol.)    Additional Information   Negative: Passed out (i.e., fainted, collapsed and was not responding)   Negative: Shock suspected (e.g., cold/pale/clammy skin, too weak to stand, low BP, rapid pulse)   Negative: Sounds like a life-threatening emergency to the triager   Negative: SEVERE abdominal pain (e.g., excruciating)   Negative: Vomiting red blood or black (coffee ground) material   Negative: Blood in bowel movements  (Exception: Blood on surface of BM with constipation.)   Negative: Black or tarry bowel movements  (Exception: Chronic-unchanged black-grey BMs AND is taking iron pills or Pepto-Bismol.)   Negative: MILD TO MODERATE constant pain lasting > 2 hours   Negative: Vomiting bile (green color)   Negative: Patient sounds very sick or weak to the triager   Negative: Vomiting and abdomen looks much more swollen than usual   Negative: White of the eyes have turned yellow (i.e., jaundice)   Negative: Blood in urine (red, pink, or tea-colored)   Negative: Fever > 103 F (39.4 C)   Negative: Fever > 101 F (38.3 C) and over 60 years of age   Negative: Fever > 100.0 F (37.8 C) and has diabetes mellitus or a weak immune system (e.g., HIV positive, cancer chemotherapy, organ transplant, splenectomy, chronic steroids)   Negative: Fever > 100.0 F (37.8 C) and bedridden (e.g., CVA, chronic illness, recovering from surgery)   Negative: Pregnant or could be  pregnant (i.e., missed last menstrual period)    Protocols used: Abdominal Pain - Female-A-[place]

## 2024-05-28 ENCOUNTER — HOSPITAL ENCOUNTER (OUTPATIENT)
Dept: RADIOLOGY | Facility: HOSPITAL | Age: 69
Discharge: HOME | End: 2024-05-28

## 2024-05-28 ENCOUNTER — OFFICE VISIT (OUTPATIENT)
Dept: INTERNAL MEDICINE | Facility: CLINIC | Age: 69
End: 2024-05-28
Payer: MEDICARE

## 2024-05-28 VITALS
SYSTOLIC BLOOD PRESSURE: 124 MMHG | BODY MASS INDEX: 39.38 KG/M2 | DIASTOLIC BLOOD PRESSURE: 58 MMHG | TEMPERATURE: 98 F | HEIGHT: 67 IN | OXYGEN SATURATION: 94 % | HEART RATE: 83 BPM | WEIGHT: 250.88 LBS

## 2024-05-28 DIAGNOSIS — I10 PRIMARY HYPERTENSION: ICD-10-CM

## 2024-05-28 DIAGNOSIS — J18.9 PNEUMONIA DUE TO INFECTIOUS ORGANISM, UNSPECIFIED LATERALITY, UNSPECIFIED PART OF LUNG: ICD-10-CM

## 2024-05-28 DIAGNOSIS — I50.9 CONGESTIVE HEART FAILURE, UNSPECIFIED HF CHRONICITY, UNSPECIFIED HEART FAILURE TYPE: ICD-10-CM

## 2024-05-28 DIAGNOSIS — Z09 HOSPITAL DISCHARGE FOLLOW-UP: Primary | ICD-10-CM

## 2024-05-28 DIAGNOSIS — C56.3 OVARIAN CANCER, BILATERAL: ICD-10-CM

## 2024-05-28 DIAGNOSIS — R10.84 GENERALIZED ABDOMINAL PAIN: ICD-10-CM

## 2024-05-28 DIAGNOSIS — R10.32 LLQ PAIN: ICD-10-CM

## 2024-05-28 DIAGNOSIS — E66.01 SEVERE OBESITY (BMI 35.0-39.9) WITH COMORBIDITY: ICD-10-CM

## 2024-05-28 PROCEDURE — 99215 OFFICE O/P EST HI 40 MIN: CPT | Mod: PBBFAC,25

## 2024-05-28 PROCEDURE — 99214 OFFICE O/P EST MOD 30 MIN: CPT | Mod: S$GLB,,,

## 2024-05-28 PROCEDURE — 71046 X-RAY EXAM CHEST 2 VIEWS: CPT | Mod: 26,,, | Performed by: RADIOLOGY

## 2024-05-28 PROCEDURE — G2211 COMPLEX E/M VISIT ADD ON: HCPCS | Mod: S$GLB,,,

## 2024-05-28 PROCEDURE — 71046 X-RAY EXAM CHEST 2 VIEWS: CPT | Mod: TC

## 2024-05-28 PROCEDURE — 99999 PR PBB SHADOW E&M-EST. PATIENT-LVL V: CPT | Mod: PBBFAC,,,

## 2024-05-28 NOTE — PROGRESS NOTES
Casie Frias Givens  05/28/2024  [id number]    Rossana Ang MD  Patient Care Team:  Rossana Ang MD as PCP - General (Family Medicine)  Radha Foley LPN as Care Coordinator (Internal Medicine)  Gerri Meyers RD (Inactive) as Dietitian (Nutrition)          Visit Type:a scheduled visit following a recent hospitalization    Chief Complaint:  Chief Complaint   Patient presents with    Hospital Follow Up               History of Present Illness:    : 68 y.o. female patient with a PMHx of HTN, CHF, anemia, anxiety, MediPort, and ovarian cancer (last chemo 2/2024). Presented to the Emergency Department for evaluation of a cough which onset gradually beginning 3 days PTA.  Also complains of left-sided swelling/edema, reported swelling of left side of face neck left upper extremity left lower extremity.  On exam left lower extremity edematous from hip to toes, mild edema to left side of face left neck and left arm.  Symptoms are constant and moderate in severity. No mitigating or exacerbating factors reported. Associated sxs include SOB, fever (100.6), headache, lower abdominal pain and diarrhea. Patient denies any dysuria, back pain, weakness, and all other sxs at this time. Prior Tx includes Tylenol.  In the ED, vitals:  138/66, 88, 24, 98.4 F, 95% RA.  Patient noted to have desaturations to 90-91% with talking, dyspnea with conversation.  Significant labs: HGB: 11.0, AST:  52, ALT: 56, troponin:  0.082, procalcitonin: 0.40.  CTA chest:Moderate size consolidative opacity in the right lung concerning for infection.  CXR: No acute findings.  EKG:  Negative for STEMI.  Blood cultures collected.  Unable to collect a sample from [facility].  Initiated on IV antibiotics in the ED. patient is a full code.  Placed in observation under the care of Hospital Medicine for management of pneumonia.    Patient has a diagnosis of pneumonia. The cause of the pneumonia is suspected to be bacterial in etiology but organism  is not known. The pneumonia is  new onset . The patient has the following signs/symptoms of pneumonia: cough, sputum production, and shortness of breath. The patient does not have a current oxygen requirement and the patient does not have a home oxygen requirement. I have reviewed the pertinent imaging. The following cultures have been collected: Blood cultures The culture results are listed below.      Current antimicrobial regimen consists of the antibiotics listed below. Will monitor patient closely and continue current treatment plan unchanged.     Antibiotics (From admission, onward)        Start     Stop Route Frequency Ordered     05/18/24 1400   levoFLOXacin 750 mg/150 mL IVPB 750 mg         05/19/24 0159 IV ED 1 Time 05/18/24 1321                Microbiology Results (last 7 days)         Procedure Component Value Units Date/Time     Blood culture x two cultures. Draw prior to antibiotics. [[id number]] Collected: 05/18/24 1138     Order Status: Sent Specimen: Blood from Peripheral, Forearm, Right       Blood culture x two cultures. Draw prior to antibiotics. [[id number]] Collected: 05/18/24 1055     Order Status: Sent Specimen: Blood from Peripheral, Antecubital, Right       Influenza A & B by Molecular [[id number]] Collected: 05/18/24 0946     Order Status: Completed Specimen: Nasal Swab Updated: 05/18/24 1012       Influenza A, Molecular Negative       Influenza B, Molecular Negative       Flu A & B Source Nasal swab                Lower extremity edema  Left lower extremity edema, history of lower abdominal surgeries related to ovarian cancer.  Per review of pathology report from 2019, there were no lymph nodes submitted for pathology.     --ultrasound left lower extremity        Elevated troponin  Likely secondary to demand ischemia related to pneumonia, dyspnea.  Will rule out ACS.      --trend troponin  --tele  --EKG p.r.n. chest pain/rhythm changes        Ovarian cancer, bilateral  Followed by  oncology, last chemotherapy 02/2024     --follow-up Oncology        History of CHF (congestive heart failure)  Patient is identified as having Systolic (HFrEF) heart failure that is Chronic. CHF is currently controlled. Latest ECHO performed and demonstrates- Results for orders placed during the hospital encounter of 02/26/24     Echo     Interpretation Summary    Left Ventricle: The left ventricle is normal in size. Mildly increased ventricular mass. Mildly increased wall thickness. There is concentric hypertrophy. There is normal systolic function with a visually estimated ejection fraction of 55 - 60%. There is normal diastolic function.    Right Ventricle: Normal right ventricular cavity size. Wall thickness is normal. Right ventricle wall motion  is normal. Systolic function is normal.    Pulmonic Valve: There is mild regurgitation.    IVC/SVC: Normal venous pressure at 3 mmHg.  . Continue Beta Blocker and monitor clinical status closely. Monitor on telemetry. Patient is off CHF pathway.  Monitor strict Is&Os and daily weights.  Place on fluid restriction of 1.5 L. Cardiology has not been consulted. Continue to stress to patient importance of self efficacy and  on diet for CHF. Last BNP reviewed- and noted below       Recent Labs   Lab 05/18/24  1055   BNP 21            Hypertension  Chronic, uncontrolled. Latest blood pressure and vitals reviewed-      Temp:  [98.4 °F (36.9 °C)]   Pulse:  [80-91]   Resp:  [19-24]   BP: (135-138)/(64-66)   SpO2:  [95 %-98 %] .   Home meds for hypertension were reviewed and noted below.   Hypertension Medications                    amLODIPine (NORVASC) 5 MG tablet Take 2 tablets (10 mg total) by mouth once daily.     atenoloL (TENORMIN) 25 MG tablet Take 1 tablet (25 mg total) by mouth once daily.     olmesartan-hydrochlorothiazide (BENICAR HCT) 40-25 mg per tablet Take 1 tablet by mouth once daily.     furosemide (LASIX) 40 MG tablet Take 1 tablet (40 mg total) by mouth  once daily.                While in the hospital, will manage blood pressure as follows; Continue home antihypertensive regimen     Will utilize p.r.n. blood pressure medication only if patient's blood pressure greater than 160/100 and she develops symptoms such as worsening chest pain or shortness of breath.     Localized swelling of left lower leg  Had an appt with cardiology on 5/23/24    Lower extremity swelling-  improved   Takes lasix prn, low potassium (not taking potassium supplements regularly because it makes her urinate)  Echo 2/24 EF 55-60%, normal diastolic function, concentric hypertrophy, mild MR      Transitional Care Note    Family and/or Caretaker present at visit?  No.  Diagnostic tests reviewed/disposition: I have reviewed all completed as well as pending diagnostic tests at the time of discharge.  Disease/illness education:   Home health/community services discussion/referrals: Patient does not have home health established from hospital visit.  They do not need home health.  If needed, we will set up home health for the patient.   Establishment or re-establishment of referral orders for community resources: No other necessary community resources.   Discussion with other health care providers: No discussion with other health care providers necessary.      Occasional SOB/DAVIDSON  Coughing has improved  Completed course of antibiotics   Negative for CP   Leg swelling has improved     Having lower abd pain  Started a few weeks ago  Gradually getting worse  If she holds her urine too long, she feels the pain  Now she is having pain during the day.  At times can be intense  She has daily bowel movements     No fever or nausea      History:  Past Medical History:   Diagnosis Date    Anemia     Anxiety     Arthritis     knees    Cancer     ovarian    CHF (congestive heart failure)     Hx antineoplastic chemotherapy     last 6/2019    Hyperlipemia     Hypertension     Neck pain     Ovarian cancer 2019     CHEMO    Peritoneal carcinomatosis 2019     Past Surgical History:   Procedure Laterality Date    breast reduction  10/02/2018    CATARACT EXTRACTION Bilateral     2023     SECTION      X 1    COLONOSCOPY N/A 2021    Procedure: COLONOSCOPY;  Surgeon: Paulette Rojas MD;  Location: [facility] ENDO;  Service: Gastroenterology;  Laterality: N/A;    CYSTOSCOPY W/ URETERAL STENT PLACEMENT Right 2019    Procedure: CYSTOSCOPY, WITH URETERAL STENT INSERTION;  Surgeon: Timmy Santiago IV, MD;  Location: [facility] OR;  Service: Urology;  Laterality: Right;    CYSTOSCOPY W/ URETERAL STENT REMOVAL  10/04/2019    DILATION AND CURETTAGE OF UTERUS      HYSTERECTOMY      RALH for fibroids (still has ovaries)    INSERTION OF VENOUS ACCESS PORT Left 2019    Procedure: INSERTION, VENOUS ACCESS PORT;  Surgeon: Ulisses Monzon MD;  Location: [facility] OR;  Service: General;  Laterality: Left;  Left internal jugular     LYSIS OF ADHESIONS OF URETER N/A 08/15/2019    Procedure: URETEROLYSIS;  Surgeon: Ismael Juarez MD;  Location: [facility], [place] operated [facility] OR;  Service: OB/GYN;  Laterality: N/A;    OMENTECTOMY N/A 08/15/2019    Procedure: OMENTECTOMY;  Surgeon: Ismael Juarez MD;  Location: [facility] [place] operated [facility] OR;  Service: OB/GYN;  Laterality: N/A;    RETROGRADE PYELOGRAPHY Right 2019    Procedure: PYELOGRAM, RETROGRADE;  Surgeon: Timmy Santiago IV, MD;  Location: [facility] OR;  Service: Urology;  Laterality: Right;    ROBOT-ASSISTED LAPAROSCOPIC SALPINGO-OOPHORECTOMY USING DA TIA XI Bilateral 08/15/2019    Procedure: XI ROBOTIC SALPINGO-OOPHORECTOMY;  Surgeon: Ismael Juarez MD;  Location: [facility] [place] operated [facility] OR;  Service: OB/GYN;  Laterality: Bilateral;    TOTAL REDUCTION MAMMOPLASTY  2018    TUBAL LIGATION       Family History   Problem Relation Name Age of Onset    Glaucoma Mother      No Known Problems Father      Colon cancer Brother      Breast cancer Maternal Aunt      Breast cancer Paternal Aunt      Ovarian cancer Paternal Aunt      Anesthesia  problems Other cousin     Thrombophilia Neg Hx       Social History     Socioeconomic History    Marital status:    Tobacco Use    Smoking status: Former     Current packs/day: 0.00     Average packs/day: 1 pack/day for 25.0 years (25.0 ttl pk-yrs)     Types: Cigarettes     Start date: 10/1/1993     Quit date: 10/1/2018     Years since quittin.6    Smokeless tobacco: Never    Tobacco comments:     States started quit 2 months ago after 30 years   Substance and Sexual Activity    Alcohol use: Yes     Comment: occasionally  No alcohol 72h prior to sx    Drug use: No    Sexual activity: Not Currently     Partners: Male     Comment: hyst; mut monog   Social History Narrative    Live w/ spouse and 2 dogs      Social Determinants of Health     Financial Resource Strain: Low Risk  (2023)    Overall Financial Resource Strain (CARDIA)     Difficulty of Paying Living Expenses: Not hard at all   Food Insecurity: No Food Insecurity (2023)    Hunger Vital Sign     Worried About Running Out of Food in the Last Year: Never true     Ran Out of Food in the Last Year: Never true   Transportation Needs: No Transportation Needs (2023)    PRAPARE - Transportation     Lack of Transportation (Medical): No     Lack of Transportation (Non-Medical): No   Physical Activity: Unknown (2023)    Exercise Vital Sign     Days of Exercise per Week: 0 days   Stress: No Stress Concern Present (2023)    [employer] - Occupational Stress Questionnaire     Feeling of Stress : Not at all   Housing Stability: Unknown (2023)    Housing Stability Vital Sign     Unable to Pay for Housing in the Last Year: No     Unstable Housing in the Last Year: No     Patient Active Problem List   Diagnosis    Hypertension    Osteoarthritis of both knees    Hyperlipidemia    Peritoneal carcinomatosis    Morbid obesity with BMI of 40.0-44.9, adult    Status post placement of ureteral stent     Abnormal ECG    Dyspnea on exertion    Pulmonary HTN    Ovarian cancer, bilateral    S/P BSO (bilateral salpingo-oophorectomy)    Encounter for antineoplastic chemotherapy    Anxiety    DDD (degenerative disc disease), cervical    Family history of colon cancer    Acute right-sided thoracic back pain    Acute right-sided low back pain without sciatica    Sciatic nerve pain    Vasovagal reaction    Drug reaction    Infusion reaction    Left groin pain    Pruritus    Decreased functional mobility and endurance    Decreased range of motion of lumbar spine    Proximal muscle weakness    Sleep-disordered breathing    Chemotherapy-induced peripheral neuropathy    Pneumonia     Review of patient's allergies indicates:  No Known Allergies    The following were reviewed at this visit: active problem list, medication list, allergies, family history, social history, and health maintenance.    Medications:  Current Outpatient Medications on File Prior to Visit   Medication Sig Dispense Refill    albuterol (PROVENTIL/VENTOLIN HFA) 90 mcg/actuation inhaler Inhale 2 puffs into the lungs every 4 (four) hours as needed for Wheezing. Rescue 18 g 1    ALPRAZolam (XANAX) 0.25 MG tablet Take 1 tablet (0.25 mg total) by mouth 3 (three) times daily as needed for Anxiety. 60 tablet 2    amLODIPine (NORVASC) 5 MG tablet Take 2 tablets (10 mg total) by mouth once daily. 180 tablet 3    atenoloL (TENORMIN) 25 MG tablet Take 1 tablet (25 mg total) by mouth once daily. 90 tablet 2    benzonatate (TESSALON) 100 MG capsule Take 1 capsule (100 mg total) by mouth 3 (three) times daily as needed for Cough. 30 capsule 0    biotin 1 mg tablet Take 1,000 mcg by mouth once daily.       cetirizine (ZYRTEC) 10 MG tablet Take 1 tablet (10 mg total) by mouth once daily. (Patient taking differently: Take 10 mg by mouth as needed for Allergies.) 30 tablet 5    diclofenac sodium (VOLTAREN) 1 % Gel Apply once a day to back as needed 100 g 1    EPINEPHrine  (EPIPEN) 0.3 mg/0.3 mL AtIn Inject 0.3 mLs (0.3 mg total) into the muscle once. for 1 dose 2 each 0    fluticasone propionate (FLONASE) 50 mcg/actuation nasal spray 1 spray (50 mcg total) by Each Nostril route every 12 (twelve) hours as needed for Rhinitis. 16 g 0    furosemide (LASIX) 40 MG tablet Take 1 tablet (40 mg total) by mouth once daily. 30 tablet 11    gabapentin (NEURONTIN) 300 MG capsule Take 2 capsules (600 mg total) by mouth 3 (three) times daily. 180 capsule 3    ginkgo biloba 40 mg Tab Take 40 mg by mouth once daily.      levoFLOXacin (LEVAQUIN) 750 MG tablet Take 1 tablet (750 mg total) by mouth once daily. 5 tablet 0    multivitamin with minerals tablet Take 1 tablet by mouth once daily.       olmesartan-hydrochlorothiazide (BENICAR HCT) 40-25 mg per tablet Take 1 tablet by mouth once daily. 90 tablet 1    potassium chloride SA (K-DUR,KLOR-CON) 20 MEQ tablet Take 1 tablet (20 mEq total) by mouth 2 (two) times daily. 2 tablet 0    rosuvastatin (CRESTOR) 10 MG tablet Take 1 tablet (10 mg total) by mouth once daily. 90 tablet 1    sertraline (ZOLOFT) 25 MG tablet Take 1 tablet (25 mg total) by mouth once daily. (Patient not taking: Reported on 5/24/2024) 90 tablet 0     No current facility-administered medications on file prior to visit.       Medications have been reviewed and reconciled with patient at this visit.  Barriers to medications reviewed with patient.    Adverse reactions to current medications reviewed with patient..    Over the counter medications reviewed and reconciled with patient.    Exam:  Wt Readings from Last 3 Encounters:   05/23/24 114.9 kg (253 lb 4.9 oz)   05/18/24 116.1 kg (256 lb)   04/17/24 116.4 kg (256 lb 9.9 oz)     Temp Readings from Last 3 Encounters:   05/20/24 98.1 °F (36.7 °C) (Oral)   04/17/24 97.2 °F (36.2 °C) (Temporal)   03/26/24 97.7 °F (36.5 °C)     BP Readings from Last 3 Encounters:   05/23/24 110/62   05/20/24 (!) 117/57   04/17/24 125/69     Pulse Readings  from Last 3 Encounters:   05/23/24 82   05/20/24 65   04/17/24 80     There is no height or weight on file to calculate BMI.      Review of Systems   Respiratory:  Positive for shortness of breath. Negative for cough.    Cardiovascular:  Negative for chest pain.   Gastrointestinal:  Positive for abdominal pain. Negative for constipation, diarrhea, nausea and vomiting.     Physical Exam  Nursing note reviewed.   Constitutional:       Appearance: She is obese.   HENT:      Head: Normocephalic and atraumatic.   Cardiovascular:      Rate and Rhythm: Regular rhythm.      Heart sounds: Normal heart sounds.   Pulmonary:      Effort: Pulmonary effort is normal. No respiratory distress.      Breath sounds: Normal breath sounds.   Abdominal:      General: Bowel sounds are decreased.      Tenderness: There is abdominal tenderness in the left lower quadrant. There is no right CVA tenderness or left CVA tenderness.   Skin:     General: Skin is dry.   Neurological:      Mental Status: She is alert and oriented to person, place, and time.   Psychiatric:         Mood and Affect: Mood normal.         Behavior: Behavior normal.         Thought Content: Thought content normal.         Judgment: Judgment normal.         Laboratory Reviewed ({Yes)  Lab Results   Component Value Date    WBC 5.07 05/20/2024    HGB 10.8 (L) 05/20/2024    HCT 33.9 (L) 05/20/2024     05/20/2024    CHOL 163 01/06/2022    TRIG 71 01/06/2022    HDL 51 01/06/2022    ALT 45 (H) 05/20/2024    AST 41 (H) 05/20/2024     05/20/2024    K 4.1 05/20/2024     05/20/2024    CREATININE 0.8 05/20/2024    BUN 10 05/20/2024    CO2 26 05/20/2024    TSH 2.460 09/20/2023    INR 1.0 01/28/2019    HGBA1C 5.8 (H) 06/12/2023     Casie was seen today for hospital follow up.    Diagnoses and all orders for this visit:    Hospital discharge follow-up    Pneumonia due to infectious organism, unspecified laterality, unspecified part of lung  -     X-Ray Chest PA And  Lateral; Future    Primary hypertension  At visit, Blood pressure is at goal. Continue current medications      Ovarian cancer, bilateral  -     CT Abdomen Pelvis W Wo Contrast; Future    Congestive heart failure, unspecified HF chronicity, unspecified heart failure type    Severe obesity (BMI 35.0-39.9) with comorbidity  Encouraged healthy diet and exercise as tolerated to help bring BMI into normal range.      Generalized abdominal pain  -     Cancel: X-Ray Abdomen AP 1 View; Future  -     CT Abdomen Pelvis W Wo Contrast; Future  -     Urinalysis; Future    LLQ pain  -     CT Abdomen Pelvis W Wo Contrast; Future  -     Urinalysis; Future    Repeat CXR today   Urine    LLQ abd pain and history of ovarian cancer, will order STAT CT abd  Pt has an appt with Dr. Ang in July. Will keep the appt at this time       CHF  Recommend 2-3 gram sodium restriction and 1500cc- 2000cc fluid restriction.  Encourage physical activity with graded exercise program as tolerated   Requested patient to weigh themselves daily, and to cardiology or their primary care team if their weight increases by more than 3 lbs in 1 day or 5 lbs in 3 days.  Wear compression socks and elevate legs above the heart to help with leg swelling      Informed the patient that they can go to local their local pharmacy for recommend vaccines     Visit today included increased complexity associated with the care of the episodic problem HTN , which was addressed while instituting co-management of the longitudinal care of the patient due to the serious and/or complex managed problem(s) .    I have evaluated and discussed management associated with medical care services that serve as the continuing focal point for all needed health care services and/or with medical care services that are part of ongoing care related to my patient's single, serious condition or a complex condition(s).    I am providing ongoing care and I am the primary care provider for this  patient, and they are being managed, monitored, and/or observed for their chronic conditions over time.     I have addressed their ongoing health maintenance requirements and needs for all health care services and reviewed co-management plans provided by specialty providers when available.    Health Maintenance Due   Topic Date Due    RSV Vaccine (Age 60+ and Pregnant patients) (1 - 1-dose 60+ series) Never done    Shingles Vaccine (1 of 2) 08/06/2015    Mammogram  07/14/2024        Care Plan/Goals: Reviewed    Goals         Blood Pressure < 130/80 (pt-stated)       Exercise at least 150 minutes per week.       Maintain a low sodium diet       American Heart Association (AHA) guidelines recommend less than 1500 mg of dietary salt per day.        Take at least one BP reading per week at various times of the day             Follow up: No follow-ups on file.    After visit summary was printed and given to patient upon discharge today.  Patient goals and care plan are included in After Visit Summary.

## 2024-05-29 ENCOUNTER — HOSPITAL ENCOUNTER (OUTPATIENT)
Dept: RADIOLOGY | Facility: HOSPITAL | Age: 69
Discharge: HOME | End: 2024-05-29
Payer: COMMERCIAL

## 2024-05-29 ENCOUNTER — PATIENT MESSAGE (OUTPATIENT)
Dept: OPHTHALMOLOGY | Facility: CLINIC | Age: 69
End: 2024-05-29

## 2024-05-29 ENCOUNTER — OFFICE VISIT (OUTPATIENT)
Dept: OPHTHALMOLOGY | Facility: CLINIC | Age: 69
End: 2024-05-29
Payer: MEDICARE

## 2024-05-29 DIAGNOSIS — H40.013 AT LOW RISK FOR OPEN-ANGLE GLAUCOMA IN BOTH EYES: Primary | ICD-10-CM

## 2024-05-29 DIAGNOSIS — Z83.511 FAMILY HISTORY OF GLAUCOMA IN MOTHER: ICD-10-CM

## 2024-05-29 DIAGNOSIS — R10.84 GENERALIZED ABDOMINAL PAIN: ICD-10-CM

## 2024-05-29 DIAGNOSIS — R10.32 LLQ PAIN: ICD-10-CM

## 2024-05-29 DIAGNOSIS — C56.3 OVARIAN CANCER, BILATERAL: ICD-10-CM

## 2024-05-29 PROCEDURE — 74177 CT ABD & PELVIS W/CONTRAST: CPT | Mod: TC

## 2024-05-29 PROCEDURE — 74177 CT ABD & PELVIS W/CONTRAST: CPT | Mod: 26,,, | Performed by: RADIOLOGY

## 2024-05-29 PROCEDURE — 99213 OFFICE O/P EST LOW 20 MIN: CPT | Mod: S$PBB,HCNC,, | Performed by: OPTOMETRIST

## 2024-05-29 PROCEDURE — A9698 NON-RAD CONTRAST MATERIALNOC: HCPCS

## 2024-05-29 PROCEDURE — 99999 PR PBB SHADOW E&M-EST. PATIENT-LVL I: CPT | Mod: PBBFAC,,, | Performed by: OPTOMETRIST

## 2024-05-29 PROCEDURE — 25500020 PHARM REV CODE 255

## 2024-05-29 RX ADMIN — IOHEXOL 1000 ML: 12 SOLUTION ORAL at 12:05

## 2024-05-29 RX ADMIN — IOHEXOL 100 ML: 350 INJECTION, SOLUTION INTRAVENOUS at 12:05

## 2024-05-29 NOTE — PROGRESS NOTES
SUBJECTIVE  Casie Gaines is 68 y.o. female  Uncorrected distance visual acuity was 20/20 -1 in the right eye and 20/20 -1 in the left eye.   Chief Complaint   Patient presents with    Glaucoma     Pt here for IOP check. Pt denies using eye drop. Pt states her vision has been fine. Pt denies flashes and floaters.           HPI     Glaucoma     Additional comments: Pt here for IOP check. Pt denies using eye drop. Pt   states her vision has been fine. Pt denies flashes and floaters.            Comments    TRF REFERRAL      1. PCIOL OS 3/9/23/ SY60WF 20.0/ CDE: 8.63  Phaco with IOL OD  with Kenalog 04-13-23/ SY60WF 21.0/ CDE: 10.46  2. Low tension fareed (per TRF)  +f/h COAG - Mom                Last edited by Panda Nguyen, OD on 5/29/2024 10:50 AM.         Assessment /Plan :  1. At low risk for open-angle glaucoma in both eyes   Stable IOP OU.  Educated Casie Gaines about maintaining IOP control, consistent uses of medications and frequent visits to assure the IOP control and best visual outcome.      2. Family history of glaucoma in mother   Unchanged        RTC 6m IOP, VF, GOCT

## 2024-05-30 ENCOUNTER — TELEPHONE (OUTPATIENT)
Dept: INTERNAL MEDICINE | Facility: CLINIC | Age: 69
End: 2024-05-30
Payer: MEDICARE

## 2024-05-30 ENCOUNTER — LAB VISIT (OUTPATIENT)
Dept: LAB | Facility: HOSPITAL | Age: 69
End: 2024-05-30
Attending: STUDENT IN AN ORGANIZED HEALTH CARE EDUCATION/TRAINING PROGRAM
Payer: MEDICARE

## 2024-05-30 DIAGNOSIS — R74.8 ELEVATED LIVER ENZYMES: ICD-10-CM

## 2024-05-30 LAB
ALBUMIN SERPL BCP-MCNC: 3.9 G/DL (ref 3.5–5.2)
ALP SERPL-CCNC: 117 U/L (ref 55–135)
ALT SERPL W/O P-5'-P-CCNC: 24 U/L (ref 10–44)
ANION GAP SERPL CALC-SCNC: 10 MMOL/L (ref 8–16)
AST SERPL-CCNC: 27 U/L (ref 10–40)
BILIRUB SERPL-MCNC: 0.5 MG/DL (ref 0.1–1)
BUN SERPL-MCNC: 14 MG/DL (ref 8–23)
CALCIUM SERPL-MCNC: 9.7 MG/DL (ref 8.7–10.5)
CHLORIDE SERPL-SCNC: 107 MMOL/L (ref 95–110)
CO2 SERPL-SCNC: 26 MMOL/L (ref 23–29)
CREAT SERPL-MCNC: 0.8 MG/DL (ref 0.5–1.4)
EST. GFR  (NO RACE VARIABLE): >60 ML/MIN/1.73 M^2
GLUCOSE SERPL-MCNC: 102 MG/DL (ref 70–110)
POTASSIUM SERPL-SCNC: 4.2 MMOL/L (ref 3.5–5.1)
PROT SERPL-MCNC: 7.1 G/DL (ref 6–8.4)
SODIUM SERPL-SCNC: 143 MMOL/L (ref 136–145)

## 2024-05-30 PROCEDURE — 80053 COMPREHEN METABOLIC PANEL: CPT | Performed by: STUDENT IN AN ORGANIZED HEALTH CARE EDUCATION/TRAINING PROGRAM

## 2024-05-30 PROCEDURE — 36415 COLL VENOUS BLD VENIPUNCTURE: CPT | Performed by: STUDENT IN AN ORGANIZED HEALTH CARE EDUCATION/TRAINING PROGRAM

## 2024-05-30 NOTE — TELEPHONE ENCOUNTER
Patient recently had a CT scan done. A blood clot was found in her left ovarian vein. Dr. Ang is suggesting that your office schedules a follow up visit with patient ASAP. Patient has a history of bilateral ovarian cancer. We will also assist patient in getting scheduled with GYN. Can you please contact patient for an appointment? Thank you!!

## 2024-06-02 DIAGNOSIS — I82.90 DEEP VEIN THROMBOSIS (DVT) OF NON-EXTREMITY VEIN, UNSPECIFIED CHRONICITY: Primary | ICD-10-CM

## 2024-06-02 DIAGNOSIS — Z95.828 H/O INSERTION OF CENTRAL VENOUS ACCESS PORT: ICD-10-CM

## 2024-06-02 RX ORDER — APIXABAN 5 MG (74)
KIT ORAL
Qty: 74 TABLET | Refills: 0 | Status: SHIPPED | OUTPATIENT
Start: 2024-06-02

## 2024-06-02 NOTE — PROGRESS NOTES
I spoke to Mrs. Gaines; she is having lower abdominal/pelvic pain. She was recently discharged from the hospital for treatment of PNA. I do recommend she start anticoagulation given she is having symptoms.  Most recent PET-CT 04/08/2024 showed no evidence of recurrence. She has an appointment with Gyn/Onc tomorrow.    Mckenna Pugh MD

## 2024-06-03 ENCOUNTER — OFFICE VISIT (OUTPATIENT)
Dept: GYNECOLOGIC ONCOLOGY | Facility: CLINIC | Age: 69
End: 2024-06-03
Payer: MEDICARE

## 2024-06-03 ENCOUNTER — LAB VISIT (OUTPATIENT)
Dept: LAB | Facility: OTHER | Age: 69
End: 2024-06-03
Attending: OBSTETRICS & GYNECOLOGY
Payer: MEDICARE

## 2024-06-03 ENCOUNTER — TELEPHONE (OUTPATIENT)
Dept: INFUSION THERAPY | Facility: HOSPITAL | Age: 69
End: 2024-06-03
Payer: MEDICARE

## 2024-06-03 VITALS
SYSTOLIC BLOOD PRESSURE: 114 MMHG | WEIGHT: 252 LBS | DIASTOLIC BLOOD PRESSURE: 56 MMHG | HEART RATE: 73 BPM | BODY MASS INDEX: 39.47 KG/M2

## 2024-06-03 DIAGNOSIS — C56.3 OVARIAN CANCER, BILATERAL: ICD-10-CM

## 2024-06-03 DIAGNOSIS — Z51.11 ENCOUNTER FOR ANTINEOPLASTIC CHEMOTHERAPY: ICD-10-CM

## 2024-06-03 DIAGNOSIS — C56.3 OVARIAN CANCER, BILATERAL: Primary | ICD-10-CM

## 2024-06-03 LAB — CANCER AG125 SERPL-ACNC: 12 U/ML (ref 0–30)

## 2024-06-03 PROCEDURE — 3074F SYST BP LT 130 MM HG: CPT | Mod: HCNC,CPTII,S$GLB, | Performed by: OBSTETRICS & GYNECOLOGY

## 2024-06-03 PROCEDURE — 3288F FALL RISK ASSESSMENT DOCD: CPT | Mod: HCNC,CPTII,S$GLB, | Performed by: OBSTETRICS & GYNECOLOGY

## 2024-06-03 PROCEDURE — 3078F DIAST BP <80 MM HG: CPT | Mod: HCNC,CPTII,S$GLB, | Performed by: OBSTETRICS & GYNECOLOGY

## 2024-06-03 PROCEDURE — 1111F DSCHRG MED/CURRENT MED MERGE: CPT | Mod: HCNC,CPTII,S$GLB, | Performed by: OBSTETRICS & GYNECOLOGY

## 2024-06-03 PROCEDURE — 3008F BODY MASS INDEX DOCD: CPT | Mod: HCNC,CPTII,S$GLB, | Performed by: OBSTETRICS & GYNECOLOGY

## 2024-06-03 PROCEDURE — 36415 COLL VENOUS BLD VENIPUNCTURE: CPT | Mod: HCNC | Performed by: OBSTETRICS & GYNECOLOGY

## 2024-06-03 PROCEDURE — 1160F RVW MEDS BY RX/DR IN RCRD: CPT | Mod: HCNC,CPTII,S$GLB, | Performed by: OBSTETRICS & GYNECOLOGY

## 2024-06-03 PROCEDURE — 1159F MED LIST DOCD IN RCRD: CPT | Mod: HCNC,CPTII,S$GLB, | Performed by: OBSTETRICS & GYNECOLOGY

## 2024-06-03 PROCEDURE — 99999 PR PBB SHADOW E&M-EST. PATIENT-LVL IV: CPT | Mod: PBBFAC,,, | Performed by: OBSTETRICS & GYNECOLOGY

## 2024-06-03 PROCEDURE — 1125F AMNT PAIN NOTED PAIN PRSNT: CPT | Mod: HCNC,CPTII,S$GLB, | Performed by: OBSTETRICS & GYNECOLOGY

## 2024-06-03 PROCEDURE — 86304 IMMUNOASSAY TUMOR CA 125: CPT | Mod: HCNC | Performed by: OBSTETRICS & GYNECOLOGY

## 2024-06-03 PROCEDURE — 1101F PT FALLS ASSESS-DOCD LE1/YR: CPT | Mod: HCNC,CPTII,S$GLB, | Performed by: OBSTETRICS & GYNECOLOGY

## 2024-06-03 PROCEDURE — 99214 OFFICE O/P EST MOD 30 MIN: CPT | Mod: HCNC,S$GLB,, | Performed by: OBSTETRICS & GYNECOLOGY

## 2024-06-03 NOTE — TELEPHONE ENCOUNTER
Spoke with patient and port flush scheduled for 2pm tomorrow at the [facility] per patient's preferences.   Call ended well.

## 2024-06-03 NOTE — PROGRESS NOTES
Subjective:      Patient ID: Casie Gaines is a 68 y.o. female.    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     Here today for continued surveillance.  Had a CT on 5/29/24 that was negative for recurrent disease.  Demonstrated a left ovarian vein thrombosis.  Given her abdominal pain. She was started on Eliquis.  Otherwise no other complaints.  CA-125 pending.  Review of Systems   Constitutional:  Negative for activity change, appetite change, chills, fatigue and fever.   HENT:  Negative for hearing loss, mouth sores, nosebleeds, sore throat and tinnitus.    Eyes:  Negative for visual disturbance.   Respiratory:  Negative for cough, chest tightness, shortness of breath and wheezing.    Cardiovascular:  Negative for chest pain and leg swelling.   Gastrointestinal:  Negative for abdominal distention, abdominal pain, blood in stool, constipation, diarrhea, nausea and vomiting.   Genitourinary:  Negative for dysuria, flank pain, frequency, hematuria, pelvic pain, vaginal bleeding, vaginal discharge and vaginal pain.   Musculoskeletal:  Negative for arthralgias and back pain.   Skin:  Negative for rash.   Neurological:  Negative for dizziness, seizures, syncope, weakness and numbness.   Hematological:  Does not bruise/bleed easily.   Psychiatric/Behavioral:  Negative for confusion and sleep disturbance. The patient is not nervous/anxious.         Objective:   Physical Exam:   Constitutional: She appears well-developed and well-nourished. No distress.    HENT:   Head: Normocephalic and atraumatic.    Eyes: No scleral icterus.     Cardiovascular:  Normal rate and intact distal pulses.      Exam reveals no cyanosis and no edema.        Pulmonary/Chest: Effort normal. No respiratory distress. She exhibits no tenderness.        Abdominal: Soft. She exhibits no distension (incisions healing well), no fluid wave and no mass. There is no abdominal tenderness. There is no rebound and no guarding. No hernia.     Genitourinary:    Vagina and rectum normal.      Pelvic exam was performed with patient supine.     Labial bartholins normal.There is no rash, tenderness or lesion on the right labia. There is no rash, tenderness or lesion on the left labia. Right adnexum displays no mass, no tenderness and no fullness. Left adnexum displays no mass, no tenderness and no fullness. No vaginal discharge, bleeding or prolapse of vaginal walls in the vagina. Cervix is absent.Uterus is absent.              Lymphadenopathy:     She has no cervical adenopathy. No inguinal adenopathy noted on the right or left side.     Skin: No cyanosis.        Assessment:     1. Ovarian cancer, bilateral    2. Encounter for antineoplastic chemotherapy        Plan:       Patient is doing well and is CLYDE on exam. RTC 6 months with CA-125.  Evidently has PE at end of month so will f/u then.

## 2024-06-04 ENCOUNTER — INFUSION (OUTPATIENT)
Dept: INFUSION THERAPY | Facility: HOSPITAL | Age: 69
End: 2024-06-04
Attending: INTERNAL MEDICINE
Payer: MEDICARE

## 2024-06-04 DIAGNOSIS — C56.3 OVARIAN CANCER, BILATERAL: Primary | ICD-10-CM

## 2024-06-04 PROCEDURE — 96523 IRRIG DRUG DELIVERY DEVICE: CPT | Mod: HCNC

## 2024-06-04 PROCEDURE — 63600175 PHARM REV CODE 636 W HCPCS: Mod: HCNC | Performed by: INTERNAL MEDICINE

## 2024-06-04 PROCEDURE — 36593 DECLOT VASCULAR DEVICE: CPT | Mod: HCNC

## 2024-06-04 PROCEDURE — 36430 TRANSFUSION BLD/BLD COMPNT: CPT | Mod: HCNC

## 2024-06-04 RX ORDER — HEPARIN 100 UNIT/ML
500 SYRINGE INTRAVENOUS
OUTPATIENT
Start: 2024-06-04

## 2024-06-04 RX ORDER — SODIUM CHLORIDE 0.9 % (FLUSH) 0.9 %
10 SYRINGE (ML) INJECTION
Status: DISCONTINUED | OUTPATIENT
Start: 2024-06-04 | End: 2024-06-04 | Stop reason: HOSPADM

## 2024-06-04 RX ORDER — HEPARIN 100 UNIT/ML
500 SYRINGE INTRAVENOUS
Status: DISCONTINUED | OUTPATIENT
Start: 2024-06-04 | End: 2024-06-04 | Stop reason: HOSPADM

## 2024-06-04 RX ORDER — SODIUM CHLORIDE 0.9 % (FLUSH) 0.9 %
10 SYRINGE (ML) INJECTION
OUTPATIENT
Start: 2024-06-04

## 2024-06-04 RX ADMIN — ALTEPLASE 2 MG: 2.2 INJECTION, POWDER, LYOPHILIZED, FOR SOLUTION INTRAVENOUS at 02:06

## 2024-06-04 RX ADMIN — HEPARIN 500 UNITS: 100 SYRINGE at 03:06

## 2024-06-04 NOTE — DISCHARGE INSTRUCTIONS
[facility]  [address] [facility]  [address]  [phone] phone     [phone] fax  Hours of Operation: Monday- Friday 8:00am- 5:00pm  After hours phone  [phone]  Hematology / Oncology Physicians on call      RADHA Morales Dr., NP Phaon Dunbar, NP Khelsea Conley, FNP    Please call with any concerns regarding your appointment today.

## 2024-06-04 NOTE — NURSING
Blood return noted. Alteplse reomoved per protocol and port flushed per protocol. See flowsheet. Next port flush scheduled.

## 2024-06-06 ENCOUNTER — LAB VISIT (OUTPATIENT)
Dept: LAB | Facility: HOSPITAL | Age: 69
End: 2024-06-06
Attending: FAMILY MEDICINE
Payer: MEDICARE

## 2024-06-06 DIAGNOSIS — E78.2 MIXED HYPERLIPIDEMIA: ICD-10-CM

## 2024-06-06 LAB
CHOLEST SERPL-MCNC: 162 MG/DL (ref 120–199)
CHOLEST/HDLC SERPL: 3.4 {RATIO} (ref 2–5)
HDLC SERPL-MCNC: 47 MG/DL (ref 40–75)
HDLC SERPL: 29 % (ref 20–50)
LDLC SERPL CALC-MCNC: 97.6 MG/DL (ref 63–159)
NONHDLC SERPL-MCNC: 115 MG/DL
TRIGL SERPL-MCNC: 87 MG/DL (ref 30–150)

## 2024-06-06 PROCEDURE — 80061 LIPID PANEL: CPT | Mod: HCNC | Performed by: FAMILY MEDICINE

## 2024-06-06 PROCEDURE — 36415 COLL VENOUS BLD VENIPUNCTURE: CPT | Mod: HCNC | Performed by: FAMILY MEDICINE

## 2024-06-07 ENCOUNTER — PATIENT MESSAGE (OUTPATIENT)
Dept: INTERNAL MEDICINE | Facility: CLINIC | Age: 69
End: 2024-06-07
Payer: MEDICARE

## 2024-06-12 RX ORDER — LANOLIN ALCOHOL/MO/W.PET/CERES
400 CREAM (GRAM) TOPICAL DAILY
Qty: 1 TABLET | Refills: 0 | OUTPATIENT
Start: 2024-06-12

## 2024-06-12 NOTE — TELEPHONE ENCOUNTER
No care due was identified.  [facility] Embedded Care Due Messages. Reference number: [id number].   6/12/2024 10:33:42 AM CDT

## 2024-06-13 NOTE — DISCHARGE SUMMARY
[facility] Medicine  Discharge Summary      Patient Name: Casie Gaines  MRN: [id number]  TANISHA: [id number]  Patient Class: IP- Inpatient  Admission Date: 5/18/2024  Hospital Length of Stay: 1 days  Discharge Date and Time: 5/20/2024 12:58 PM  Attending Physician: No att. providers found   Discharging Provider: Ashley Birmingham MD  Primary Care Provider: Rossana Ang MD    Primary Care Team: [facility] MEDICINE B    HPI:   68 y.o. female patient with a PMHx of HTN, CHF, anemia, anxiety, MediPort, and ovarian cancer (last chemo 2/2024). Presented to the Emergency Department for evaluation of a cough which onset gradually beginning 3 days PTA.  Also complains of left-sided swelling/edema, reported swelling of left side of face neck left upper extremity left lower extremity.  On exam left lower extremity edematous from hip to toes, mild edema to left side of face left neck and left arm.  Symptoms are constant and moderate in severity. No mitigating or exacerbating factors reported. Associated sxs include SOB, fever (100.6), headache, lower abdominal pain and diarrhea. Patient denies any dysuria, back pain, weakness, and all other sxs at this time. Prior Tx includes Tylenol.  In the ED, vitals:  138/66, 88, 24, 98.4 F, 95% RA.  Patient noted to have desaturations to 90-91% with talking, dyspnea with conversation.  Significant labs: HGB: 11.0, AST:  52, ALT: 56, troponin:  0.082, procalcitonin: 0.40.  CTA chest:Moderate size consolidative opacity in the right lung concerning for infection.  CXR: No acute findings.  EKG:  Negative for STEMI.  Blood cultures collected.  Unable to collect a sample from [facility].  Initiated on IV antibiotics in the ED. patient is a full code.  Placed in observation under the care of [facility] Medicine for management of pneumonia.    * No surgery found *      Hospital Course:   68-year-old female with care [facility] medicine for management of pneumonia.  Continues to have  mild hypoxia, with 2 L requirement for support.  Continues on IV Levaquin, added IV Diflucan today due to history of mold exposure.  Magnesium low, repleted.  Potassium mildly low, repleted.  Possible discharge tomorrow if hypoxia resolved.    5/20- looks and feels lot better, no SOB, ambulating well on RA with Sats 93%. Cough, chest congestion much better. She is eating drinking well, walking around well. She was seen and examined and deemed stable for discharge home today.      Goals of Care Treatment Preferences:  Code Status: Full Code      Consults:     No new Assessment & Plan notes have been filed under this hospital service since the last note was generated.  Service: Hospital Medicine    Final Active Diagnoses:    Diagnosis Date Noted POA    PRINCIPAL PROBLEM:  Pneumonia [J18.9] 05/18/2024 Yes    Ovarian cancer, bilateral [C56.3] 08/15/2019 Yes    Hypertension [I10] 06/12/2013 Yes      Problems Resolved During this Admission:    Diagnosis Date Noted Date Resolved POA    Elevated troponin [R79.89] 05/18/2024 05/20/2024 Yes    Lower extremity edema [R60.0] 05/18/2024 05/20/2024 Yes    History of CHF (congestive heart failure) [Z86.79] 06/12/2013 05/20/2024 Not Applicable       Discharged Condition: stable    Disposition: Home or Self Care    Follow Up:   Follow-up Information       Rossana Ang MD. Schedule an appointment as soon as possible for a visit in 3 day(s).    Specialty: Family Medicine  Why: Hospital follow up  Contact information:  [address] [address] [facility] [address] [phone]                           Patient Instructions:      Diet Cardiac     Activity as tolerated       Significant Diagnostic Studies: N/A    Pending Diagnostic Studies:       None           Medications:  Reconciled Home Medications:      Medication List        CHANGE how you take these medications      cetirizine 10 MG tablet  Commonly known as: ZYRTEC  Take 1 tablet (10 mg total) by mouth once daily.  What  changed:   when to take this  reasons to take this            CONTINUE taking these medications      albuterol 90 mcg/actuation inhaler  Commonly known as: PROVENTIL/VENTOLIN HFA  Inhale 2 puffs into the lungs every 4 (four) hours as needed for Wheezing. Rescue     ALPRAZolam 0.25 MG tablet  Commonly known as: XANAX  Take 1 tablet (0.25 mg total) by mouth 3 (three) times daily as needed for Anxiety.     amLODIPine 5 MG tablet  Commonly known as: NORVASC  Take 2 tablets (10 mg total) by mouth once daily.     atenoloL 25 MG tablet  Commonly known as: TENORMIN  Take 1 tablet (25 mg total) by mouth once daily.     biotin 1 mg tablet  Take 1,000 mcg by mouth once daily.     diclofenac sodium 1 % Gel  Commonly known as: VOLTAREN  Apply once a day to back as needed     EPINEPHrine 0.3 mg/0.3 mL Atin  Commonly known as: EPIPEN  Inject 0.3 mLs (0.3 mg total) into the muscle once. for 1 dose     fluticasone propionate 50 mcg/actuation nasal spray  Commonly known as: FLONASE  1 spray (50 mcg total) by Each Nostril route every 12 (twelve) hours as needed for Rhinitis.     furosemide 40 MG tablet  Commonly known as: LASIX  Take 1 tablet (40 mg total) by mouth once daily.     gabapentin 300 MG capsule  Commonly known as: NEURONTIN  Take 2 capsules (600 mg total) by mouth 3 (three) times daily.     ginkgo biloba 40 mg Tab  Take 40 mg by mouth once daily.     multivitamin with minerals tablet  Take 1 tablet by mouth once daily.     olmesartan-hydrochlorothiazide 40-25 mg per tablet  Commonly known as: BENICAR HCT  Take 1 tablet by mouth once daily.     potassium chloride SA 20 MEQ tablet  Commonly known as: K-DUR,KLOR-CON  Take 1 tablet (20 mEq total) by mouth 2 (two) times daily.     rosuvastatin 10 MG tablet  Commonly known as: CRESTOR  Take 1 tablet (10 mg total) by mouth once daily.     sertraline 25 MG tablet  Commonly known as: ZOLOFT  Take 1 tablet (25 mg total) by mouth once daily.            STOP taking these medications       azelastine 137 mcg (0.1 %) nasal spray  Commonly known as: ASTELIN     dexAMETHasone 4 MG Tab  Commonly known as: DECADRON     magnesium oxide 400 mg (241.3 mg magnesium) tablet  Commonly known as: MAG-OX     OLANZapine 5 MG tablet  Commonly known as: ZyPREXA     ondansetron 8 MG Tbdl  Commonly known as: ZOFRAN-ODT     pantoprazole 40 MG tablet  Commonly known as: PROTONIX     prochlorperazine 5 MG tablet  Commonly known as: COMPAZINE     zinc gluconate 50 mg tablet            ASK your doctor about these medications      fluconazole 200 MG Tab  Commonly known as: DIFLUCAN  Take 1 tablet (200 mg total) by mouth once daily. for 5 days  Ask about: Should I take this medication?              Indwelling Lines/Drains at time of discharge:   Lines/Drains/Airways       Central Venous Catheter Line  Duration             Port A Cath Single Lumen left subclavian -- days                    Time spent on the discharge of patient: 37 minutes         Ashley Birmingham MD  Department of Hospital Medicine  [facility] [facility] S[facility]

## 2024-07-02 ENCOUNTER — INFUSION (OUTPATIENT)
Dept: INFUSION THERAPY | Facility: HOSPITAL | Age: 69
End: 2024-07-02
Attending: INTERNAL MEDICINE
Payer: MEDICARE

## 2024-07-02 ENCOUNTER — HOSPITAL ENCOUNTER (OUTPATIENT)
Dept: RADIOLOGY | Facility: HOSPITAL | Age: 69
Discharge: HOME | End: 2024-07-02
Attending: INTERNAL MEDICINE
Payer: MEDICARE

## 2024-07-02 DIAGNOSIS — T45.1X5A CHEMOTHERAPY-INDUCED PERIPHERAL NEUROPATHY: ICD-10-CM

## 2024-07-02 DIAGNOSIS — C56.3 OVARIAN CANCER, BILATERAL: Primary | ICD-10-CM

## 2024-07-02 DIAGNOSIS — G62.0 CHEMOTHERAPY-INDUCED PERIPHERAL NEUROPATHY: ICD-10-CM

## 2024-07-02 PROCEDURE — 78815 PET IMAGE W/CT SKULL-THIGH: CPT | Mod: TC,HCNC

## 2024-07-02 PROCEDURE — 63600175 PHARM REV CODE 636 W HCPCS: Mod: HCNC | Performed by: INTERNAL MEDICINE

## 2024-07-02 PROCEDURE — A9552 F18 FDG: HCPCS | Mod: HCNC | Performed by: INTERNAL MEDICINE

## 2024-07-02 PROCEDURE — 96523 IRRIG DRUG DELIVERY DEVICE: CPT | Mod: HCNC

## 2024-07-02 PROCEDURE — A4216 STERILE WATER/SALINE, 10 ML: HCPCS | Mod: HCNC | Performed by: INTERNAL MEDICINE

## 2024-07-02 PROCEDURE — 25000003 PHARM REV CODE 250: Mod: HCNC | Performed by: INTERNAL MEDICINE

## 2024-07-02 RX ORDER — SODIUM CHLORIDE 0.9 % (FLUSH) 0.9 %
10 SYRINGE (ML) INJECTION
Status: DISCONTINUED | OUTPATIENT
Start: 2024-07-02 | End: 2024-07-02 | Stop reason: HOSPADM

## 2024-07-02 RX ORDER — HEPARIN 100 UNIT/ML
500 SYRINGE INTRAVENOUS
OUTPATIENT
Start: 2024-07-02

## 2024-07-02 RX ORDER — FLUDEOXYGLUCOSE F18 500 MCI/ML
13.71 INJECTION INTRAVENOUS
Status: COMPLETED | OUTPATIENT
Start: 2024-07-02 | End: 2024-07-02

## 2024-07-02 RX ORDER — HEPARIN 100 UNIT/ML
500 SYRINGE INTRAVENOUS
Status: DISCONTINUED | OUTPATIENT
Start: 2024-07-02 | End: 2024-07-02 | Stop reason: HOSPADM

## 2024-07-02 RX ORDER — SODIUM CHLORIDE 0.9 % (FLUSH) 0.9 %
10 SYRINGE (ML) INJECTION
OUTPATIENT
Start: 2024-07-02

## 2024-07-02 RX ADMIN — HEPARIN 500 UNITS: 100 SYRINGE at 10:07

## 2024-07-02 RX ADMIN — FLUDEOXYGLUCOSE F-18 13.71 MILLICURIE: 500 INJECTION INTRAVENOUS at 09:07

## 2024-07-02 RX ADMIN — Medication 10 ML: at 10:07

## 2024-07-02 NOTE — NURSING
"Pt here for Mediport access for PET scan. Left chestwall mediport accessed with a 20g 1" rodriguez via sterile technique.  Excellent blood return noted.  Flushed with 10ml NS. Dressing applied.   Upon return from PET, needle D/C, Heparin 5ml flush.  site without redness, swelling, or drainage noted.   Patient tolerated well.   "

## 2024-07-05 ENCOUNTER — OFFICE VISIT (OUTPATIENT)
Dept: HEMATOLOGY/ONCOLOGY | Facility: CLINIC | Age: 69
End: 2024-07-05
Payer: MEDICARE

## 2024-07-05 ENCOUNTER — LAB VISIT (OUTPATIENT)
Dept: LAB | Facility: HOSPITAL | Age: 69
End: 2024-07-05
Attending: INTERNAL MEDICINE
Payer: MEDICARE

## 2024-07-05 VITALS
TEMPERATURE: 98 F | SYSTOLIC BLOOD PRESSURE: 106 MMHG | HEIGHT: 67 IN | BODY MASS INDEX: 38.7 KG/M2 | OXYGEN SATURATION: 95 % | DIASTOLIC BLOOD PRESSURE: 66 MMHG | WEIGHT: 246.56 LBS | HEART RATE: 69 BPM

## 2024-07-05 DIAGNOSIS — R59.1 LYMPHADENOPATHY: Primary | ICD-10-CM

## 2024-07-05 DIAGNOSIS — C56.3 OVARIAN CANCER, BILATERAL: ICD-10-CM

## 2024-07-05 DIAGNOSIS — C78.6 PERITONEAL CARCINOMATOSIS: ICD-10-CM

## 2024-07-05 DIAGNOSIS — Z90.722 S/P BSO (BILATERAL SALPINGO-OOPHORECTOMY): ICD-10-CM

## 2024-07-05 DIAGNOSIS — C56.1 MALIGNANT NEOPLASM OF RIGHT OVARY: ICD-10-CM

## 2024-07-05 DIAGNOSIS — C56.3 MALIGNANT NEOPLASM OF BILATERAL OVARIES: ICD-10-CM

## 2024-07-05 LAB
ALBUMIN SERPL BCP-MCNC: 3.9 G/DL (ref 3.5–5.2)
ALP SERPL-CCNC: 110 U/L (ref 55–135)
ALT SERPL W/O P-5'-P-CCNC: 25 U/L (ref 10–44)
ANION GAP SERPL CALC-SCNC: 12 MMOL/L (ref 8–16)
AST SERPL-CCNC: 29 U/L (ref 10–40)
BASOPHILS # BLD AUTO: 0.07 K/UL (ref 0–0.2)
BASOPHILS NFR BLD: 0.8 % (ref 0–1.9)
BILIRUB SERPL-MCNC: 0.4 MG/DL (ref 0.1–1)
BUN SERPL-MCNC: 21 MG/DL (ref 8–23)
CALCIUM SERPL-MCNC: 10.1 MG/DL (ref 8.7–10.5)
CHLORIDE SERPL-SCNC: 104 MMOL/L (ref 95–110)
CO2 SERPL-SCNC: 27 MMOL/L (ref 23–29)
CREAT SERPL-MCNC: 1.1 MG/DL (ref 0.5–1.4)
DIFFERENTIAL METHOD BLD: ABNORMAL
EOSINOPHIL # BLD AUTO: 0.2 K/UL (ref 0–0.5)
EOSINOPHIL NFR BLD: 2.1 % (ref 0–8)
ERYTHROCYTE [DISTWIDTH] IN BLOOD BY AUTOMATED COUNT: 14.9 % (ref 11.5–14.5)
EST. GFR  (NO RACE VARIABLE): 54 ML/MIN/1.73 M^2
GLUCOSE SERPL-MCNC: 101 MG/DL (ref 70–110)
HCT VFR BLD AUTO: 37.8 % (ref 37–48.5)
HGB BLD-MCNC: 12.1 G/DL (ref 12–16)
IMM GRANULOCYTES # BLD AUTO: 0.02 K/UL (ref 0–0.04)
IMM GRANULOCYTES NFR BLD AUTO: 0.2 % (ref 0–0.5)
LYMPHOCYTES # BLD AUTO: 2.2 K/UL (ref 1–4.8)
LYMPHOCYTES NFR BLD: 26.5 % (ref 18–48)
MAGNESIUM SERPL-MCNC: 1.5 MG/DL (ref 1.6–2.6)
MCH RBC QN AUTO: 28.2 PG (ref 27–31)
MCHC RBC AUTO-ENTMCNC: 32 G/DL (ref 32–36)
MCV RBC AUTO: 88 FL (ref 82–98)
MONOCYTES # BLD AUTO: 0.5 K/UL (ref 0.3–1)
MONOCYTES NFR BLD: 6 % (ref 4–15)
NEUTROPHILS # BLD AUTO: 5.3 K/UL (ref 1.8–7.7)
NEUTROPHILS NFR BLD: 64.4 % (ref 38–73)
NRBC BLD-RTO: 0 /100 WBC
PHOSPHATE SERPL-MCNC: 3.5 MG/DL (ref 2.7–4.5)
PLATELET # BLD AUTO: 294 K/UL (ref 150–450)
PMV BLD AUTO: 10.2 FL (ref 9.2–12.9)
POTASSIUM SERPL-SCNC: 3.8 MMOL/L (ref 3.5–5.1)
PROT SERPL-MCNC: 7.2 G/DL (ref 6–8.4)
RBC # BLD AUTO: 4.29 M/UL (ref 4–5.4)
SODIUM SERPL-SCNC: 143 MMOL/L (ref 136–145)
WBC # BLD AUTO: 8.29 K/UL (ref 3.9–12.7)

## 2024-07-05 PROCEDURE — 1101F PT FALLS ASSESS-DOCD LE1/YR: CPT | Mod: HCNC,CPTII,S$GLB, | Performed by: INTERNAL MEDICINE

## 2024-07-05 PROCEDURE — 3008F BODY MASS INDEX DOCD: CPT | Mod: HCNC,CPTII,S$GLB, | Performed by: INTERNAL MEDICINE

## 2024-07-05 PROCEDURE — 84100 ASSAY OF PHOSPHORUS: CPT | Mod: HCNC | Performed by: INTERNAL MEDICINE

## 2024-07-05 PROCEDURE — 3078F DIAST BP <80 MM HG: CPT | Mod: HCNC,CPTII,S$GLB, | Performed by: INTERNAL MEDICINE

## 2024-07-05 PROCEDURE — 99999 PR PBB SHADOW E&M-EST. PATIENT-LVL IV: CPT | Mod: PBBFAC,HCNC,, | Performed by: INTERNAL MEDICINE

## 2024-07-05 PROCEDURE — 1125F AMNT PAIN NOTED PAIN PRSNT: CPT | Mod: HCNC,CPTII,S$GLB, | Performed by: INTERNAL MEDICINE

## 2024-07-05 PROCEDURE — 85025 COMPLETE CBC W/AUTO DIFF WBC: CPT | Mod: HCNC | Performed by: INTERNAL MEDICINE

## 2024-07-05 PROCEDURE — 80053 COMPREHEN METABOLIC PANEL: CPT | Mod: HCNC | Performed by: INTERNAL MEDICINE

## 2024-07-05 PROCEDURE — 3288F FALL RISK ASSESSMENT DOCD: CPT | Mod: HCNC,CPTII,S$GLB, | Performed by: INTERNAL MEDICINE

## 2024-07-05 PROCEDURE — 36415 COLL VENOUS BLD VENIPUNCTURE: CPT | Mod: HCNC | Performed by: INTERNAL MEDICINE

## 2024-07-05 PROCEDURE — 3074F SYST BP LT 130 MM HG: CPT | Mod: HCNC,CPTII,S$GLB, | Performed by: INTERNAL MEDICINE

## 2024-07-05 PROCEDURE — 83735 ASSAY OF MAGNESIUM: CPT | Mod: HCNC | Performed by: INTERNAL MEDICINE

## 2024-07-05 PROCEDURE — 1159F MED LIST DOCD IN RCRD: CPT | Mod: HCNC,CPTII,S$GLB, | Performed by: INTERNAL MEDICINE

## 2024-07-05 PROCEDURE — 99215 OFFICE O/P EST HI 40 MIN: CPT | Mod: HCNC,S$GLB,, | Performed by: INTERNAL MEDICINE

## 2024-07-05 NOTE — PROGRESS NOTES
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 female who presents to clinic for follow up.    She reports that when she yawns, she has felt an uncomfortable pain/discomfort for several months that is worsening and now having pain in right hip.  She had a vaginal yeast infection recently, but no other infections.  She has had night sweats and her appetite has been a little decreased and night sweats.     I reviewed the PET-CT with her which shows several areas of avid lymphadenopathy.  I advised that we need to obtain a biopsy and she is in agreement.  She states that she feels something isn't right in how she feels physically so she is willing to do whatever workup necessary to figure out what's going on.    Review of Systems   Constitutional:  Positive for diaphoresis and fatigue. Negative for activity change, appetite change, chills, fever and unexpected weight change.   HENT:  Negative for nosebleeds.    Respiratory:  Positive for shortness of breath.    Cardiovascular:  Negative for leg swelling.   Musculoskeletal:  Negative for back pain.   Skin:  Negative for rash.   Neurological:  Positive for numbness. Negative for dizziness, weakness, light-headedness and headaches.   Hematological:  Does not bruise/bleed easily.   Psychiatric/Behavioral:  The patient is not nervous/anxious.      History     Oncology History   Peritoneal  carcinomatosis   1/26/2019 Initial Diagnosis    Peritoneal carcinomatosis     2/15/2019 - 7/8/2019 Chemotherapy    Treatment Summary   Plan Name: OP GYN PACLITAXEL CARBOPLATIN (AUC 6) Q3W  Treatment Goal: Palliative  Status: Inactive  Start Date: 2/28/2019  End Date: 6/18/2019  Provider: Will Trevizo Jr., MD  Chemotherapy: bevacizumab (AVASTIN) 15 mg/kg = 1,600 mg in sodium chloride 0.9% 100 mL chemo infusion, 15 mg/kg = 1,600 mg (100 % of original dose 15 mg/kg), Intravenous, Clinic/[facility] 1 time, 6 of 6 cycles  Dose modification: 15 mg/kg (original dose 15 mg/kg, Cycle 1)  Administration: 1,600 mg (2/28/2019), 1,600 mg (3/21/2019), 1,600 mg (4/11/2019), 1,600 mg (5/7/2019), 1,600 mg (5/28/2019), 1,510 mg (6/18/2019)  CARBOplatin (PARAPLATIN) 870 mg in sodium chloride 0.9% 337 mL chemo infusion, 870 mg (100 % of original dose 868.8 mg), Intravenous, Clinic/[facility] 1 time, 6 of 6 cycles  Dose modification:   (original dose 868.8 mg, Cycle 1)  Administration: 870 mg (2/28/2019), 870 mg (3/21/2019), 900 mg (4/11/2019), 870 mg (5/7/2019), 660 mg (5/28/2019), 690 mg (6/18/2019)  PACLitaxel (TAXOL) 175 mg/m2 = 396 mg in sodium chloride 0.9% 566 mL chemo infusion, 175 mg/m2 = 396 mg, Intravenous, Clinic/[facility] 1 time, 6 of 6 cycles  Dose modification: 140 mg/m2 (80 % of original dose 175 mg/m2, Cycle 5)  Administration: 396 mg (2/28/2019), 396 mg (3/21/2019), 396 mg (4/11/2019), 396 mg (5/7/2019), 318 mg (5/28/2019), 306 mg (6/18/2019)     10/9/2019 - 9/24/2020 Chemotherapy    Treatment Summary   Plan Name: OP BEVACIZUMAB Q3W   Treatment Goal: Maintenance  Status: Inactive  Start Date: 10/9/2019  End Date: 9/24/2020  Provider: Oscar Mckeon MD  Chemotherapy: dexAMETHasone tablet 8 mg, 8 mg (100 % of original dose 8 mg), Oral, Clinic/[facility] 1 time, 2 of 8 cycles  Dose modification: 8 mg (original dose 8 mg, Cycle 8), 8 mg (original dose 8 mg, Cycle 12)  Administration: 8 mg (7/22/2020), 8 mg (8/13/2020)  bevacizumab (AVASTIN) 15  mg/kg = 1,615 mg in sodium chloride 0.9% 100 mL chemo infusion, 15 mg/kg = 1,615 mg, Intravenous, Clinic/[facility] 1 time, 11 of 17 cycles  Administration: 1,615 mg (10/9/2019), 1,615 mg (10/29/2019), 1,615 mg (12/10/2019), 1,615 mg (1/21/2020), 1,600 mg (4/2/2020), 1,615 mg (4/23/2020), 1,600 mg (7/1/2020), 1,600 mg (7/22/2020), 1,600 mg (8/13/2020), 1,795 mg (9/3/2020), 1,795 mg (9/24/2020)     9/29/2023 -  Chemotherapy    Treatment Summary   Plan Name: OP GYN PACLITAXEL CARBOPLATIN desensitization (AUC 5) Q3W  Treatment Goal: Control  Status: Active  Start Date: 9/29/2023  End Date: 10/2/2024 (Planned)  Provider: Ismael Juarez MD  Chemotherapy: CARBOplatin (PARAPLATIN) 650 mg in sodium chloride 0.9% 335 mL chemo infusion, 650 mg (100 % of original dose 648 mg), Intravenous, Clinic/[facility] 1 time, 6 of 17 cycles  Dose modification:   (original dose 648 mg, Cycle 1),   (original dose 853.8 mg, Cycle 2),   (original dose 730 mg, Cycle 3),   (original dose 730 mg, Cycle 4), 5 mg (original dose 730 mg, Cycle 4), 7.3 mg (original dose 730 mg, Cycle 4), 5 mg (original dose 730 mg, Cycle 4, Reason: MD Discretion, Comment: desensitization), 73 mg (original dose 730 mg, Cycle 4), 720 mg (original dose 730 mg, Cycle 4, Reason: MD Discretion, Comment: desensitization), 648.97 mg (original dose 730 mg, Cycle 4, Reason: MD Discretion, Comment: desensitization), 0.61 mg (original dose 730 mg, Cycle 5, Reason: MD Discretion, Comment: desensitization), 5 mg (original dose 730 mg, Cycle 5, Reason: MD Discretion, Comment: desens), 60.5 mg (original dose 730 mg, Cycle 5, Reason: MD Discretion, Comment: desensitization),   (original dose 730 mg, Cycle 5, Reason: MD Discretion, Comment: new scr), 6.05 mg (original dose 730 mg, Cycle 5, Reason: Dose not tolerated, Comment: desensitization)  Administration: 650 mg (9/29/2023), 730 mg (11/10/2023), 710 mg (10/20/2023), 5 mg (1/3/2024), 5 mg (1/3/2024), 75 mg (1/3/2024), 650 mg (1/3/2024), 5 mg  (2/14/2024), 5 mg (2/14/2024), 75 mg (2/14/2024), 650 mg (2/14/2024), 5 mg (1/24/2024), 60 mg (1/24/2024), 605 mg (1/24/2024), 5 mg (1/24/2024)  PACLitaxeL (TAXOL) 175 mg/m2 = 402 mg in sodium chloride 0.9% 500 mL chemo infusion, 175 mg/m2 = 402 mg, Intravenous, Clinic/[facility] 1 time, 6 of 17 cycles  Administration: 402 mg (9/29/2023), 402 mg (10/20/2023), 408 mg (11/10/2023), 408 mg (1/3/2024), 408 mg (1/24/2024), 408 mg (2/14/2024)     Malignant neoplasm of right ovary (Resolved)   2/15/2019 Initial Diagnosis    Malignant neoplasm of right ovary     2/15/2019 - 7/8/2019 Chemotherapy    Treatment Summary   Plan Name: OP GYN PACLITAXEL CARBOPLATIN (AUC 6) Q3W  Treatment Goal: Palliative  Status: Inactive  Start Date: 2/28/2019  End Date: 6/18/2019  Provider: Will Trevizo Jr., MD  Chemotherapy: bevacizumab (AVASTIN) 15 mg/kg = 1,600 mg in sodium chloride 0.9% 100 mL chemo infusion, 15 mg/kg = 1,600 mg (100 % of original dose 15 mg/kg), Intravenous, Clinic/[facility] 1 time, 6 of 6 cycles  Dose modification: 15 mg/kg (original dose 15 mg/kg, Cycle 1)  Administration: 1,600 mg (2/28/2019), 1,600 mg (3/21/2019), 1,600 mg (4/11/2019), 1,600 mg (5/7/2019), 1,600 mg (5/28/2019), 1,510 mg (6/18/2019)  CARBOplatin (PARAPLATIN) 870 mg in sodium chloride 0.9% 337 mL chemo infusion, 870 mg (100 % of original dose 868.8 mg), Intravenous, Clinic/[facility] 1 time, 6 of 6 cycles  Dose modification:   (original dose 868.8 mg, Cycle 1)  Administration: 870 mg (2/28/2019), 870 mg (3/21/2019), 900 mg (4/11/2019), 870 mg (5/7/2019), 660 mg (5/28/2019), 690 mg (6/18/2019)  PACLitaxel (TAXOL) 175 mg/m2 = 396 mg in sodium chloride 0.9% 566 mL chemo infusion, 175 mg/m2 = 396 mg, Intravenous, Clinic[facility] 1 time, 6 of 6 cycles  Dose modification: 140 mg/m2 (80 % of original dose 175 mg/m2, Cycle 5)  Administration: 396 mg (2/28/2019), 396 mg (3/21/2019), 396 mg (4/11/2019), 396 mg (5/7/2019), 318 mg (5/28/2019), 306 mg (6/18/2019)     10/9/2019 - 9/24/2020  Chemotherapy    Treatment Summary   Plan Name: OP BEVACIZUMAB Q3W   Treatment Goal: Maintenance  Status: Inactive  Start Date: 10/9/2019  End Date: 9/24/2020  Provider: Oscar Mckeon MD  Chemotherapy: dexAMETHasone tablet 8 mg, 8 mg (100 % of original dose 8 mg), Oral, Clinic/[facility] 1 time, 2 of 8 cycles  Dose modification: 8 mg (original dose 8 mg, Cycle 8), 8 mg (original dose 8 mg, Cycle 12)  Administration: 8 mg (7/22/2020), 8 mg (8/13/2020)  bevacizumab (AVASTIN) 15 mg/kg = 1,615 mg in sodium chloride 0.9% 100 mL chemo infusion, 15 mg/kg = 1,615 mg, Intravenous, Clinic/[facility] 1 time, 11 of 17 cycles  Administration: 1,615 mg (10/9/2019), 1,615 mg (10/29/2019), 1,615 mg (12/10/2019), 1,615 mg (1/21/2020), 1,600 mg (4/2/2020), 1,615 mg (4/23/2020), 1,600 mg (7/1/2020), 1,600 mg (7/22/2020), 1,600 mg (8/13/2020), 1,795 mg (9/3/2020), 1,795 mg (9/24/2020)     Malignant neoplasm of both ovaries (Resolved)   2/18/2019 Initial Diagnosis    Ovarian cancer     10/9/2019 - 9/24/2020 Chemotherapy    Treatment Summary   Plan Name: OP BEVACIZUMAB Q3W   Treatment Goal: Maintenance  Status: Inactive  Start Date: 10/9/2019  End Date: 9/24/2020  Provider: Oscar Mckeon MD  Chemotherapy: dexAMETHasone tablet 8 mg, 8 mg (100 % of original dose 8 mg), Oral, Clinic/[facility] 1 time, 2 of 8 cycles  Dose modification: 8 mg (original dose 8 mg, Cycle 8), 8 mg (original dose 8 mg, Cycle 12)  Administration: 8 mg (7/22/2020), 8 mg (8/13/2020)  bevacizumab (AVASTIN) 15 mg/kg = 1,615 mg in sodium chloride 0.9% 100 mL chemo infusion, 15 mg/kg = 1,615 mg, Intravenous, Clinic/[facility] 1 time, 11 of 17 cycles  Administration: 1,615 mg (10/9/2019), 1,615 mg (10/29/2019), 1,615 mg (12/10/2019), 1,615 mg (1/21/2020), 1,600 mg (4/2/2020), 1,615 mg (4/23/2020), 1,600 mg (7/1/2020), 1,600 mg (7/22/2020), 1,600 mg (8/13/2020), 1,795 mg (9/3/2020), 1,795 mg (9/24/2020)     Ovarian cancer, bilateral   8/15/2019 Initial Diagnosis    Ovarian cancer, bilateral      10/9/2019 - 9/24/2020 Chemotherapy    Treatment Summary   Plan Name: OP BEVACIZUMAB Q3W   Treatment Goal: Maintenance  Status: Inactive  Start Date: 10/9/2019  End Date: 9/24/2020  Provider: Oscar Mckeon MD  Chemotherapy: dexAMETHasone tablet 8 mg, 8 mg (100 % of original dose 8 mg), Oral, Clinic/[facility] 1 time, 2 of 8 cycles  Dose modification: 8 mg (original dose 8 mg, Cycle 8), 8 mg (original dose 8 mg, Cycle 12)  Administration: 8 mg (7/22/2020), 8 mg (8/13/2020)  bevacizumab (AVASTIN) 15 mg/kg = 1,615 mg in sodium chloride 0.9% 100 mL chemo infusion, 15 mg/kg = 1,615 mg, Intravenous, Clinic/[facility] 1 time, 11 of 17 cycles  Administration: 1,615 mg (10/9/2019), 1,615 mg (10/29/2019), 1,615 mg (12/10/2019), 1,615 mg (1/21/2020), 1,600 mg (4/2/2020), 1,615 mg (4/23/2020), 1,600 mg (7/1/2020), 1,600 mg (7/22/2020), 1,600 mg (8/13/2020), 1,795 mg (9/3/2020), 1,795 mg (9/24/2020)     9/29/2023 -  Chemotherapy    Treatment Summary   Plan Name: OP GYN PACLITAXEL CARBOPLATIN desensitization (AUC 5) Q3W  Treatment Goal: Control  Status: Active  Start Date: 9/29/2023  End Date: 10/2/2024 (Planned)  Provider: Ismael Juarez MD  Chemotherapy: CARBOplatin (PARAPLATIN) 650 mg in sodium chloride 0.9% 335 mL chemo infusion, 650 mg (100 % of original dose 648 mg), Intravenous, Clinic/[facility] 1 time, 6 of 17 cycles  Dose modification:   (original dose 648 mg, Cycle 1),   (original dose 853.8 mg, Cycle 2),   (original dose 730 mg, Cycle 3),   (original dose 730 mg, Cycle 4), 5 mg (original dose 730 mg, Cycle 4), 7.3 mg (original dose 730 mg, Cycle 4), 5 mg (original dose 730 mg, Cycle 4, Reason: MD Discretion, Comment: desensitization), 73 mg (original dose 730 mg, Cycle 4), 720 mg (original dose 730 mg, Cycle 4, Reason: MD Discretion, Comment: desensitization), 648.97 mg (original dose 730 mg, Cycle 4, Reason: MD Discretion, Comment: desensitization), 0.61 mg (original dose 730 mg, Cycle 5, Reason: MD Discretion, Comment:  desensitization), 5 mg (original dose 730 mg, Cycle 5, Reason: MD Discretion, Comment: desens), 60.5 mg (original dose 730 mg, Cycle 5, Reason: MD Discretion, Comment: desensitization),   (original dose 730 mg, Cycle 5, Reason: MD Discretion, Comment: new scr), 6.05 mg (original dose 730 mg, Cycle 5, Reason: Dose not tolerated, Comment: desensitization)  Administration: 650 mg (2023), 730 mg (11/10/2023), 710 mg (10/20/2023), 5 mg (1/3/2024), 5 mg (1/3/2024), 75 mg (1/3/2024), 650 mg (1/3/2024), 5 mg (2024), 5 mg (2024), 75 mg (2024), 650 mg (2024), 5 mg (2024), 60 mg (2024), 605 mg (2024), 5 mg (2024)  PACLitaxeL (TAXOL) 175 mg/m2 = 402 mg in sodium chloride 0.9% 500 mL chemo infusion, 175 mg/m2 = 402 mg, Intravenous, Clinic/[facility] 1 time, 6 of 17 cycles  Administration: 402 mg (2023), 402 mg (10/20/2023), 408 mg (11/10/2023), 408 mg (1/3/2024), 408 mg (2024), 408 mg (2024)           Past Medical History:   Diagnosis Date    Anemia     Anxiety     Arthritis     knees    Cancer     ovarian    CHF (congestive heart failure)     Hx antineoplastic chemotherapy     last 2019    Hyperlipemia     Hypertension     Neck pain     Ovarian cancer 2019    CHEMO    Peritoneal carcinomatosis 2019       Past Surgical History:   Procedure Laterality Date    breast reduction  10/02/2018    CATARACT EXTRACTION Bilateral     2023     SECTION      X 1    COLONOSCOPY N/A 2021    Procedure: COLONOSCOPY;  Surgeon: Paulette Rojas MD;  Location: [facility];  Service: Gastroenterology;  Laterality: N/A;    CYSTOSCOPY W/ URETERAL STENT PLACEMENT Right 2019    Procedure: CYSTOSCOPY, WITH URETERAL STENT INSERTION;  Surgeon: Timmy Santiago IV, MD;  Location: [facility];  Service: Urology;  Laterality: Right;    CYSTOSCOPY W/ URETERAL STENT REMOVAL  10/04/2019    DILATION AND CURETTAGE OF UTERUS      HYSTERECTOMY      RALH for fibroids (still has  ovaries)    INSERTION OF VENOUS ACCESS PORT Left 2019    Procedure: INSERTION, VENOUS ACCESS PORT;  Surgeon: Ulisses Monzon MD;  Location: [facility] OR;  Service: General;  Laterality: Left;  Left internal jugular     LYSIS OF ADHESIONS OF URETER N/A 08/15/2019    Procedure: URETEROLYSIS;  Surgeon: Ismael Juarez MD;  Location: [facility] OR;  Service: OB/GYN;  Laterality: N/A;    OMENTECTOMY N/A 08/15/2019    Procedure: OMENTECTOMY;  Surgeon: Ismael Juarez MD;  Location: [facility] OR;  Service: OB/GYN;  Laterality: N/A;    RETROGRADE PYELOGRAPHY Right 2019    Procedure: PYELOGRAM, RETROGRADE;  Surgeon: Timmy Santiago IV, MD;  Location: [facility] OR;  Service: Urology;  Laterality: Right;    ROBOT-ASSISTED LAPAROSCOPIC SALPINGO-OOPHORECTOMY USING DA TIA XI Bilateral 08/15/2019    Procedure: XI ROBOTIC SALPINGO-OOPHORECTOMY;  Surgeon: Ismael Juarez MD;  [facility];  Service: OB/GYN;  Laterality: Bilateral;    TOTAL REDUCTION MAMMOPLASTY  2018    TUBAL LIGATION         Family History   Problem Relation Name Age of Onset    Glaucoma Mother      No Known Problems Father      Colon cancer Brother      Breast cancer Maternal Aunt      Breast cancer Paternal Aunt      Ovarian cancer Paternal Aunt      Anesthesia problems Other cousin     Thrombophilia Neg Hx         Review of patient's allergies indicates:  No Known Allergies    Social History     Tobacco Use    Smoking status: Former     Current packs/day: 0.00     Average packs/day: 1 pack/day for 25.0 years (25.0 ttl pk-yrs)     Types: Cigarettes     Start date: 10/1/1993     Quit date: 10/1/2018     Years since quittin.7    Smokeless tobacco: Never    Tobacco comments:     States started quit 2 months ago after 30 years   Substance Use Topics    Alcohol use: Yes     Comment: occasionally  No alcohol 72h prior to sx    Drug use: No     Physical Exam     ECOG SCORE    0 - Fully active-able to carry on all pre-disease performance without restriction       Vitals:     07/05/24 0951   BP: 106/66   Pulse: 69   Temp: 97.6 °F (36.4 °C)       Physical Exam  Constitutional:       Appearance: Normal appearance. She is normal weight.   HENT:      Head: Normocephalic and atraumatic.   Eyes:      Conjunctiva/sclera: Conjunctivae normal.   Cardiovascular:      Rate and Rhythm: Normal rate.   Pulmonary:      Effort: Pulmonary effort is normal.   Lymphadenopathy:      Head:      Right side of head: No submental, submandibular, tonsillar, preauricular or occipital adenopathy.      Left side of head: No submental, submandibular, tonsillar, preauricular or occipital adenopathy.      Cervical: No cervical adenopathy.      Right cervical: No superficial cervical adenopathy.     Left cervical: No superficial cervical adenopathy.      Upper Body:      Right upper body: Supraclavicular adenopathy present.      Left upper body: No supraclavicular adenopathy.   Skin:     General: Skin is warm.   Neurological:      General: No focal deficit present.      Mental Status: She is alert and oriented to person, place, and time.   Psychiatric:         Mood and Affect: Mood normal.         Behavior: Behavior normal.       Labs   Labs:  Lab Visit on 07/05/2024   Component Date Value Ref Range Status    Phosphorus 07/05/2024 3.5  2.7 - 4.5 mg/dL Final    Magnesium 07/05/2024 1.5 (L)  1.6 - 2.6 mg/dL Final    Sodium 07/05/2024 143  136 - 145 mmol/L Final    Potassium 07/05/2024 3.8  3.5 - 5.1 mmol/L Final    Chloride 07/05/2024 104  95 - 110 mmol/L Final    CO2 07/05/2024 27  23 - 29 mmol/L Final    Glucose 07/05/2024 101  70 - 110 mg/dL Final    BUN 07/05/2024 21  8 - 23 mg/dL Final    Creatinine 07/05/2024 1.1  0.5 - 1.4 mg/dL Final    Calcium 07/05/2024 10.1  8.7 - 10.5 mg/dL Final    Total Protein 07/05/2024 7.2  6.0 - 8.4 g/dL Final    Albumin 07/05/2024 3.9  3.5 - 5.2 g/dL Final    Total Bilirubin 07/05/2024 0.4  0.1 - 1.0 mg/dL Final    Comment: For infants and newborns, interpretation of results should  be based  on gestational age, weight and in agreement with clinical  observations.    Premature Infant recommended reference ranges:  Up to 24 hours.............<8.0 mg/dL  Up to 48 hours............<12.0 mg/dL  3-5 days..................<15.0 mg/dL  6-29 days.................<15.0 mg/dL      Alkaline Phosphatase 07/05/2024 110  55 - 135 U/L Final    AST 07/05/2024 29  10 - 40 U/L Final    ALT 07/05/2024 25  10 - 44 U/L Final    eGFR 07/05/2024 54 (A)  >60 mL/min/1.73 m^2 Final    Anion Gap 07/05/2024 12  8 - 16 mmol/L Final    WBC 07/05/2024 8.29  3.90 - 12.70 K/uL Final    RBC 07/05/2024 4.29  4.00 - 5.40 M/uL Final    Hemoglobin 07/05/2024 12.1  12.0 - 16.0 g/dL Final    Hematocrit 07/05/2024 37.8  37.0 - 48.5 % Final    MCV 07/05/2024 88  82 - 98 fL Final    MCH 07/05/2024 28.2  27.0 - 31.0 pg Final    MCHC 07/05/2024 32.0  32.0 - 36.0 g/dL Final    RDW 07/05/2024 14.9 (H)  11.5 - 14.5 % Final    Platelets 07/05/2024 294  150 - 450 K/uL Final    MPV 07/05/2024 10.2  9.2 - 12.9 fL Final    Immature Granulocytes 07/05/2024 0.2  0.0 - 0.5 % Final    Gran # (ANC) 07/05/2024 5.3  1.8 - 7.7 K/uL Final    Immature Grans (Abs) 07/05/2024 0.02  0.00 - 0.04 K/uL Final    Comment: Mild elevation in immature granulocytes is non specific and   can be seen in a variety of conditions including stress response,   acute inflammation, trauma and pregnancy. Correlation with other   laboratory and clinical findings is essential.      Lymph # 07/05/2024 2.2  1.0 - 4.8 K/uL Final    Mono # 07/05/2024 0.5  0.3 - 1.0 K/uL Final    Eos # 07/05/2024 0.2  0.0 - 0.5 K/uL Final    Baso # 07/05/2024 0.07  0.00 - 0.20 K/uL Final    nRBC 07/05/2024 0  0 /100 WBC Final    Gran % 07/05/2024 64.4  38.0 - 73.0 % Final    Lymph % 07/05/2024 26.5  18.0 - 48.0 % Final    Mono % 07/05/2024 6.0  4.0 - 15.0 % Final    Eosinophil % 07/05/2024 2.1  0.0 - 8.0 % Final    Basophil % 07/05/2024 0.8  0.0 - 1.9 % Final    Differential Method 07/05/2024  Automated   Final        Imaging   CT Chest Abdomen Pelvis With IV Contrast (XPD) NO Oral Contrast 12/12/2023  Details    Reading Physician Reading Date Result Priority   Michele Kim MD  [phone] 12/12/2023 STAT     Narrative & Impression  EXAMINATION:  CT CHEST ABDOMEN PELVIS WITH IV CONTRAST (XPD)     CLINICAL HISTORY:  Hypokalemia,follow up ovarian cancer on treatment; evaluate treatment response for subsequent treatment planning.     TECHNIQUE:  Axial CT imaging was performed through the chest, abdomen and pelvis with  100cc  of intravenous contrast. Multiplanar reformats were performed and interpreted.     COMPARISON:  08/21/2023, 09/29/2022 and 09/24/2020     FINDINGS:  Chest:     The heart, great vessels, and mediastinal structures are within normal limits. No thoracic adenopathy.  Left-sided MediPort in the SVC.  Aortic atherosclerosis.     The lungs are clear bilaterally. No pleural effusions.     Abdomen:     The liver, spleen, kidneys, adrenal glands are within normal limits.  Stable 10 mm uncinate process pancreatic cyst.  No biliary ductal dilatation.     The gallbladder is unremarkable.     No free fluid, free air, or inflammatory change.     The small bowel is within normal limits.  Colonic diverticulosis.  Supraumbilical abdominal wall hernia containing a nondistended segment of the transverse colon.  No evidence of strangulation or incarceration.  The appendix is normal.     Aortic atherosclerosis without evidence of aneurysm.     Pelvis:     The urinary bladder is unremarkable. The uterus has been removed.  No free pelvic fluid or adenopathy.     No significant osseous abnormality is identified.  No suspicious osseous lesions.     Impression:     No CT evidence of recurrent or metastatic disease in the chest, abdomen or pelvis.     All CT scans at this facility are performed  using dose modulation techniques as appropriate to performed exam including the following:  automated exposure  control; adjustment of mA and/or kV according to the patients size (this includes techniques or standardized protocols for targeted exams where dose is matched to indication/reason for exam: i.e. extremities or head);  iterative reconstruction technique.        NM PET CT FDG SKULL BASE TO MID THIGH - 02/26/2024  FINDINGS:  Head/neck: There is normal physiologic uptake noted within the visualized brain parenchyma. No FDG avid adenopathy within the neck.     Chest: No FDG avid pulmonary nodules/masses. No FDG avid mediastinal, hilar or axillary lymph nodes.A left-sided MediPort catheter is noted.     Abdomen/Pelvis: Normal physiologic uptake noted within the liver, spleen, urinary tract, and bowel.The adrenals are unremarkable.  There are a couple of left periaortic retroperitoneal lymph nodes that demonstrate an SUV max of up to 2.8 and 3.1.  Findings are nonspecific.  There is diverticulosis seen scattered throughout the colon.  The midportion of the transverse colon is contained within a midline ventral hernia.  No evidence for bowel obstruction..     Skeletal: No FDG avid osseus lesions identified.     Impression:  1. Low level nonspecific uptake associated with a couple of left periaortic retroperitoneal lymph nodes.  It is possible these lymph nodes may just be reactive in nature.  Continued follow-up recommended.      NM PET CT FDG SKULL BASE TO MID THIGH - 04/08/2024  FINDINGS:  Quality of the study: Adequate.     SUV max of the liver parenchyma is 3.7     Neck: No abnormal FDG avidity.  No lymphadenopathy or mass.     Chest: No FDG avid mediastinal, hilar, or axillary lymphadenopathy.  No pleural effusion.  No pulmonary nodule.     Abdomen/pelvis: Interval resolution of the low level FDG avidity of the left para-aortic lymph nodes.  Lymph nodes are also smaller compared to the prior exam and normal in size on this exam.  No abnormal FDG avidity.  No ascites.  No lymphadenopathy     Skeletal structures: No  abnormal FDG avidity.  No focal lytic or sclerotic lesion in the osseous structures.     Physiologic uptake of the tracer is present within the brain, salivary glands, myocardium, GI and  tracts.     Incidental CT findings: No interval change in the umbilical hernia containing a loop of colon.  Left IJ MediPort with its tip in the SVC.    Impression:  No FDG PET/CT findings to suggest recurrent or metastatic disease.  Interval resolution of the low level FDG avidity seen in the left para-aortic retroperitoneal lymph nodes on the prior exam.      NM PET CT FDG SKULL BASE TO MID THIGH - 07/02/2024  CLINICAL HISTORY:  surveillance; Drug-induced polyneuropathy     TECHNIQUE:  13.71 mCi of F18-FDG was administered intravenously.  After an approximately 60 min distribution time, PET/CT images were acquired from the skull base to the mid thigh. Transmission images were acquired to correct for attenuation using a whole body low-dose CT scan without contrast.     COMPARISON:  Prior PET CTs, most recent 04/08/2024; CT abdomen/pelvis 05/29/2024     FINDINGS:  Head/neck: New right supraclavicular/neck base FDG avid lymph nodes, SUV max 11.  Otherwise normal physiologic activity.     Chest: New enlarged left subpectoral FDG avid lymph node, SUV max 11.  No FDG avid right axillary adenopathy.  New mediastinal FDG avid adenopathy (paratracheal, prevascular subcarinal).  SUV max 15 (pre-vascular).  New left internal mammary FDG avid lymph node, SUV max 6.5.  New anterior pericardial FDG avid nodule, axial fused image 175.  New left paraspinal FDG avid nodule, axial fused image 176.     Trace bilateral pleural effusions.  No FDG avid pulmonary nodule appreciated.     Abdomen/pelvis: Small FDG avid left upper quadrant omental nodularity/thickening, SUV max 3.5 (axial fused image 182 through 210).  Left pericolic gutter FDG avid soft tissue nodule, SUV max 9 (axial fused image 273).  Suspected small FDG avid nodules/lymph nodes  closely associated with small bowel loops within the central upper pelvis, axial fused image 301 and 316.  FDG avid right iliac and  lymph nodes (axial fused image 324 and 331), SUV max 6.4.     Trace nonspecific pelvic free fluid.  Colonic diverticulosis.  Bowel containing anterior ventral hernia as noted on prior CT.     Skeletal/subcutaneous structures: No suspicious FDG avid osseous abnormality.     Impression:  Disease progression with neck base/chest, abdomen and pelvis FDG avid disease now present as above.     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  Patient was then treated with Avastin maintenance  08/21/2023 CT CAP: Marked interval disease progression with severe peritoneal carcinomatosis as detailed above.  12/12/2023 CT CAP: No CT evidence of recurrent or metastatic disease in the chest, abdomen or pelvis.  : 7 >> 21 >> 32 >> 64 >> 11  Started Carbo/Taxol 09/29/2023  C1 no complications  C2 and C3 (11/10/23): reaction consisting of feeling presyncopal, SOB, sweating; vitals significant for hypoxia and hypotension; evaluated by AI and recs for desensitization  C4 -C6with desensitization  Per medical record, patient declined treatment until after the new year  CT CAP 12/12/23 showed CLYDE  PET-CT 02/26/24: Low level nonspecific uptake associated with a couple of left periaortic retroperitoneal lymph nodes.  It is possible these lymph nodes may just be reactive in nature  PET-CT 04/08/2024:  No FDG PET/CT findings to suggest recurrent or metastatic disease. Interval resolution of the low level FDG avidity seen in the left para-aortic retroperitoneal lymph nodes on the prior  exam.   Plan  PET-CT 07/02/24: Disease progression with neck base/chest, abdomen and pelvis FDG avid disease now present as above.   Discussed that this is likely progression of disease but we need to biopsy the lymph nodes for pathologic confirmation and will obtain Tumor NGS/Blood NGS  Advised follow up with Gyn Onc        Chronic Medical Conditions  Osteoarthritis  HTN  Anxiety        Med Onc Chart Routing      Follow up with physician 2 weeks. review pathology   Follow up with [facility]    Infusion scheduling note    Injection scheduling note    Labs LDH and reticulocytes   Scheduling:  Preferred lab:  Lab interval:  other: labs ordered by Dr. Pugh   Imaging    Pharmacy appointment    Other referrals       Additional referrals needed  IR for LN biopsy           The patient was seen, interviewed and examined. Pertinent lab and radiologic studies were reviewed. Pt instructed to call should they develop concerning signs/symptoms or have further questions.        Portions of the record may have been created with voice recognition software. Occasional wrong-word or sound-a-likez substitutions may have occurred due to the inherent limitations of voice recognition software. Read the chart carefully and recognize, using context, where substitutions have occurred.      Torri Pugh MD    Hematology/Oncology

## 2024-07-05 NOTE — H&P (VIEW-ONLY)
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 female who presents to clinic for follow up.    She reports that when she yawns, she has felt an uncomfortable pain/discomfort for several months that is worsening and now having pain in right hip.  She had a vaginal yeast infection recently, but no other infections.  She has had night sweats and her appetite has been a little decreased and night sweats.     I reviewed the PET-CT with her which shows several areas of avid lymphadenopathy.  I advised that we need to obtain a biopsy and she is in agreement.  She states that she feels something isn't right in how she feels physically so she is willing to do whatever workup necessary to figure out what's going on.    Review of Systems   Constitutional:  Positive for diaphoresis and fatigue. Negative for activity change, appetite change, chills, fever and unexpected weight change.   HENT:  Negative for nosebleeds.    Respiratory:  Positive for shortness of breath.    Cardiovascular:  Negative for leg swelling.   Musculoskeletal:  Negative for back pain.   Skin:  Negative for rash.   Neurological:  Positive for numbness. Negative for dizziness, weakness, light-headedness and headaches.   Hematological:  Does not bruise/bleed easily.   Psychiatric/Behavioral:  The patient is not nervous/anxious.      History     Oncology History   Peritoneal  carcinomatosis   1/26/2019 Initial Diagnosis    Peritoneal carcinomatosis     2/15/2019 - 7/8/2019 Chemotherapy    Treatment Summary   Plan Name: OP GYN PACLITAXEL CARBOPLATIN (AUC 6) Q3W  Treatment Goal: Palliative  Status: Inactive  Start Date: 2/28/2019  End Date: 6/18/2019  Provider: Will Trevizo Jr., MD  Chemotherapy: bevacizumab (AVASTIN) 15 mg/kg = 1,600 mg in sodium chloride 0.9% 100 mL chemo infusion, 15 mg/kg = 1,600 mg (100 % of original dose 15 mg/kg), Intravenous, Clinic/[facility] 1 time, 6 of 6 cycles  Dose modification: 15 mg/kg (original dose 15 mg/kg, Cycle 1)  Administration: 1,600 mg (2/28/2019), 1,600 mg (3/21/2019), 1,600 mg (4/11/2019), 1,600 mg (5/7/2019), 1,600 mg (5/28/2019), 1,510 mg (6/18/2019)  CARBOplatin (PARAPLATIN) 870 mg in sodium chloride 0.9% 337 mL chemo infusion, 870 mg (100 % of original dose 868.8 mg), Intravenous, Clinic/[facility] 1 time, 6 of 6 cycles  Dose modification:   (original dose 868.8 mg, Cycle 1)  Administration: 870 mg (2/28/2019), 870 mg (3/21/2019), 900 mg (4/11/2019), 870 mg (5/7/2019), 660 mg (5/28/2019), 690 mg (6/18/2019)  PACLitaxel (TAXOL) 175 mg/m2 = 396 mg in sodium chloride 0.9% 566 mL chemo infusion, 175 mg/m2 = 396 mg, Intravenous, Clinic/[facility] 1 time, 6 of 6 cycles  Dose modification: 140 mg/m2 (80 % of original dose 175 mg/m2, Cycle 5)  Administration: 396 mg (2/28/2019), 396 mg (3/21/2019), 396 mg (4/11/2019), 396 mg (5/7/2019), 318 mg (5/28/2019), 306 mg (6/18/2019)     10/9/2019 - 9/24/2020 Chemotherapy    Treatment Summary   Plan Name: OP BEVACIZUMAB Q3W   Treatment Goal: Maintenance  Status: Inactive  Start Date: 10/9/2019  End Date: 9/24/2020  Provider: Oscar Mckeon MD  Chemotherapy: dexAMETHasone tablet 8 mg, 8 mg (100 % of original dose 8 mg), Oral, Clinic/[facility] 1 time, 2 of 8 cycles  Dose modification: 8 mg (original dose 8 mg, Cycle 8), 8 mg (original dose 8 mg, Cycle 12)  Administration: 8 mg (7/22/2020), 8 mg (8/13/2020)  bevacizumab (AVASTIN) 15  mg/kg = 1,615 mg in sodium chloride 0.9% 100 mL chemo infusion, 15 mg/kg = 1,615 mg, Intravenous, Clinic/[facility] 1 time, 11 of 17 cycles  Administration: 1,615 mg (10/9/2019), 1,615 mg (10/29/2019), 1,615 mg (12/10/2019), 1,615 mg (1/21/2020), 1,600 mg (4/2/2020), 1,615 mg (4/23/2020), 1,600 mg (7/1/2020), 1,600 mg (7/22/2020), 1,600 mg (8/13/2020), 1,795 mg (9/3/2020), 1,795 mg (9/24/2020)     9/29/2023 -  Chemotherapy    Treatment Summary   Plan Name: OP GYN PACLITAXEL CARBOPLATIN desensitization (AUC 5) Q3W  Treatment Goal: Control  Status: Active  Start Date: 9/29/2023  End Date: 10/2/2024 (Planned)  Provider: Ismael Juarez MD  Chemotherapy: CARBOplatin (PARAPLATIN) 650 mg in sodium chloride 0.9% 335 mL chemo infusion, 650 mg (100 % of original dose 648 mg), Intravenous, Clinic/[facility] 1 time, 6 of 17 cycles  Dose modification:   (original dose 648 mg, Cycle 1),   (original dose 853.8 mg, Cycle 2),   (original dose 730 mg, Cycle 3),   (original dose 730 mg, Cycle 4), 5 mg (original dose 730 mg, Cycle 4), 7.3 mg (original dose 730 mg, Cycle 4), 5 mg (original dose 730 mg, Cycle 4, Reason: MD Discretion, Comment: desensitization), 73 mg (original dose 730 mg, Cycle 4), 720 mg (original dose 730 mg, Cycle 4, Reason: MD Discretion, Comment: desensitization), 648.97 mg (original dose 730 mg, Cycle 4, Reason: MD Discretion, Comment: desensitization), 0.61 mg (original dose 730 mg, Cycle 5, Reason: MD Discretion, Comment: desensitization), 5 mg (original dose 730 mg, Cycle 5, Reason: MD Discretion, Comment: desens), 60.5 mg (original dose 730 mg, Cycle 5, Reason: MD Discretion, Comment: desensitization),   (original dose 730 mg, Cycle 5, Reason: MD Discretion, Comment: new scr), 6.05 mg (original dose 730 mg, Cycle 5, Reason: Dose not tolerated, Comment: desensitization)  Administration: 650 mg (9/29/2023), 730 mg (11/10/2023), 710 mg (10/20/2023), 5 mg (1/3/2024), 5 mg (1/3/2024), 75 mg (1/3/2024), 650 mg (1/3/2024), 5 mg  (2/14/2024), 5 mg (2/14/2024), 75 mg (2/14/2024), 650 mg (2/14/2024), 5 mg (1/24/2024), 60 mg (1/24/2024), 605 mg (1/24/2024), 5 mg (1/24/2024)  PACLitaxeL (TAXOL) 175 mg/m2 = 402 mg in sodium chloride 0.9% 500 mL chemo infusion, 175 mg/m2 = 402 mg, Intravenous, Clinic/[facility] 1 time, 6 of 17 cycles  Administration: 402 mg (9/29/2023), 402 mg (10/20/2023), 408 mg (11/10/2023), 408 mg (1/3/2024), 408 mg (1/24/2024), 408 mg (2/14/2024)     Malignant neoplasm of right ovary (Resolved)   2/15/2019 Initial Diagnosis    Malignant neoplasm of right ovary     2/15/2019 - 7/8/2019 Chemotherapy    Treatment Summary   Plan Name: OP GYN PACLITAXEL CARBOPLATIN (AUC 6) Q3W  Treatment Goal: Palliative  Status: Inactive  Start Date: 2/28/2019  End Date: 6/18/2019  Provider: Will Trevizo Jr., MD  Chemotherapy: bevacizumab (AVASTIN) 15 mg/kg = 1,600 mg in sodium chloride 0.9% 100 mL chemo infusion, 15 mg/kg = 1,600 mg (100 % of original dose 15 mg/kg), Intravenous, Clinic/[facility] 1 time, 6 of 6 cycles  Dose modification: 15 mg/kg (original dose 15 mg/kg, Cycle 1)  Administration: 1,600 mg (2/28/2019), 1,600 mg (3/21/2019), 1,600 mg (4/11/2019), 1,600 mg (5/7/2019), 1,600 mg (5/28/2019), 1,510 mg (6/18/2019)  CARBOplatin (PARAPLATIN) 870 mg in sodium chloride 0.9% 337 mL chemo infusion, 870 mg (100 % of original dose 868.8 mg), Intravenous, Clinic/[facility] 1 time, 6 of 6 cycles  Dose modification:   (original dose 868.8 mg, Cycle 1)  Administration: 870 mg (2/28/2019), 870 mg (3/21/2019), 900 mg (4/11/2019), 870 mg (5/7/2019), 660 mg (5/28/2019), 690 mg (6/18/2019)  PACLitaxel (TAXOL) 175 mg/m2 = 396 mg in sodium chloride 0.9% 566 mL chemo infusion, 175 mg/m2 = 396 mg, Intravenous, [facility] 1 time, 6 of 6 cycles  Dose modification: 140 mg/m2 (80 % of original dose 175 mg/m2, Cycle 5)  Administration: 396 mg (2/28/2019), 396 mg (3/21/2019), 396 mg (4/11/2019), 396 mg (5/7/2019), 318 mg (5/28/2019), 306 mg (6/18/2019)     10/9/2019 - 9/24/2020  Chemotherapy    Treatment Summary   Plan Name: OP BEVACIZUMAB Q3W   Treatment Goal: Maintenance  Status: Inactive  Start Date: 10/9/2019  End Date: 9/24/2020  Provider: Oscar Mckeon MD  Chemotherapy: dexAMETHasone tablet 8 mg, 8 mg (100 % of original dose 8 mg), Oral, Clinic/[facility] 1 time, 2 of 8 cycles  Dose modification: 8 mg (original dose 8 mg, Cycle 8), 8 mg (original dose 8 mg, Cycle 12)  Administration: 8 mg (7/22/2020), 8 mg (8/13/2020)  bevacizumab (AVASTIN) 15 mg/kg = 1,615 mg in sodium chloride 0.9% 100 mL chemo infusion, 15 mg/kg = 1,615 mg, Intravenous, Clinic/[facility]OD 1 time, 11 of 17 cycles  Administration: 1,615 mg (10/9/2019), 1,615 mg (10/29/2019), 1,615 mg (12/10/2019), 1,615 mg (1/21/2020), 1,600 mg (4/2/2020), 1,615 mg (4/23/2020), 1,600 mg (7/1/2020), 1,600 mg (7/22/2020), 1,600 mg (8/13/2020), 1,795 mg (9/3/2020), 1,795 mg (9/24/2020)     Malignant neoplasm of both ovaries (Resolved)   2/18/2019 Initial Diagnosis    Ovarian cancer     10/9/2019 - 9/24/2020 Chemotherapy    Treatment Summary   Plan Name: OP BEVACIZUMAB Q3W   Treatment Goal: Maintenance  Status: Inactive  Start Date: 10/9/2019  End Date: 9/24/2020  Provider: Oscar Mckeon MD  Chemotherapy: dexAMETHasone tablet 8 mg, 8 mg (100 % of original dose 8 mg), Oral, Clinic/[facility] 1 time, 2 of 8 cycles  Dose modification: 8 mg (original dose 8 mg, Cycle 8), 8 mg (original dose 8 mg, Cycle 12)  Administration: 8 mg (7/22/2020), 8 mg (8/13/2020)  bevacizumab (AVASTIN) 15 mg/kg = 1,615 mg in sodium chloride 0.9% 100 mL chemo infusion, 15 mg/kg = 1,615 mg, Intravenous, Clinic/[facility] 1 time, 11 of 17 cycles  Administration: 1,615 mg (10/9/2019), 1,615 mg (10/29/2019), 1,615 mg (12/10/2019), 1,615 mg (1/21/2020), 1,600 mg (4/2/2020), 1,615 mg (4/23/2020), 1,600 mg (7/1/2020), 1,600 mg (7/22/2020), 1,600 mg (8/13/2020), 1,795 mg (9/3/2020), 1,795 mg (9/24/2020)     Ovarian cancer, bilateral   8/15/2019 Initial Diagnosis    Ovarian cancer, bilateral      10/9/2019 - 9/24/2020 Chemotherapy    Treatment Summary   Plan Name: OP BEVACIZUMAB Q3W   Treatment Goal: Maintenance  Status: Inactive  Start Date: 10/9/2019  End Date: 9/24/2020  Provider: Oscar Mckeon MD  Chemotherapy: dexAMETHasone tablet 8 mg, 8 mg (100 % of original dose 8 mg), Oral, Clinic/[facility] 1 time, 2 of 8 cycles  Dose modification: 8 mg (original dose 8 mg, Cycle 8), 8 mg (original dose 8 mg, Cycle 12)  Administration: 8 mg (7/22/2020), 8 mg (8/13/2020)  bevacizumab (AVASTIN) 15 mg/kg = 1,615 mg in sodium chloride 0.9% 100 mL chemo infusion, 15 mg/kg = 1,615 mg, Intravenous, Clinic/H[facility] 1 time, 11 of 17 cycles  Administration: 1,615 mg (10/9/2019), 1,615 mg (10/29/2019), 1,615 mg (12/10/2019), 1,615 mg (1/21/2020), 1,600 mg (4/2/2020), 1,615 mg (4/23/2020), 1,600 mg (7/1/2020), 1,600 mg (7/22/2020), 1,600 mg (8/13/2020), 1,795 mg (9/3/2020), 1,795 mg (9/24/2020)     9/29/2023 -  Chemotherapy    Treatment Summary   Plan Name: OP GYN PACLITAXEL CARBOPLATIN desensitization (AUC 5) Q3W  Treatment Goal: Control  Status: Active  Start Date: 9/29/2023  End Date: 10/2/2024 (Planned)  Provider: Ismael Juarez MD  Chemotherapy: CARBOplatin (PARAPLATIN) 650 mg in sodium chloride 0.9% 335 mL chemo infusion, 650 mg (100 % of original dose 648 mg), Intravenous, Clinic/H[facility] 1 time, 6 of 17 cycles  Dose modification:   (original dose 648 mg, Cycle 1),   (original dose 853.8 mg, Cycle 2),   (original dose 730 mg, Cycle 3),   (original dose 730 mg, Cycle 4), 5 mg (original dose 730 mg, Cycle 4), 7.3 mg (original dose 730 mg, Cycle 4), 5 mg (original dose 730 mg, Cycle 4, Reason: MD Discretion, Comment: desensitization), 73 mg (original dose 730 mg, Cycle 4), 720 mg (original dose 730 mg, Cycle 4, Reason: MD Discretion, Comment: desensitization), 648.97 mg (original dose 730 mg, Cycle 4, Reason: MD Discretion, Comment: desensitization), 0.61 mg (original dose 730 mg, Cycle 5, Reason: MD Discretion, Comment:  desensitization), 5 mg (original dose 730 mg, Cycle 5, Reason: MD Discretion, Comment: desens), 60.5 mg (original dose 730 mg, Cycle 5, Reason: MD Discretion, Comment: desensitization),   (original dose 730 mg, Cycle 5, Reason: MD Discretion, Comment: new scr), 6.05 mg (original dose 730 mg, Cycle 5, Reason: Dose not tolerated, Comment: desensitization)  Administration: 650 mg (2023), 730 mg (11/10/2023), 710 mg (10/20/2023), 5 mg (1/3/2024), 5 mg (1/3/2024), 75 mg (1/3/2024), 650 mg (1/3/2024), 5 mg (2024), 5 mg (2024), 75 mg (2024), 650 mg (2024), 5 mg (2024), 60 mg (2024), 605 mg (2024), 5 mg (2024)  PACLitaxeL (TAXOL) 175 mg/m2 = 402 mg in sodium chloride 0.9% 500 mL chemo infusion, 175 mg/m2 = 402 mg, Intravenous, [facility] time, 6 of 17 cycles  Administration: 402 mg (2023), 402 mg (10/20/2023), 408 mg (11/10/2023), 408 mg (1/3/2024), 408 mg (2024), 408 mg (2024)           Past Medical History:   Diagnosis Date    Anemia     Anxiety     Arthritis     knees    Cancer     ovarian    CHF (congestive heart failure)     Hx antineoplastic chemotherapy     last 2019    Hyperlipemia     Hypertension     Neck pain     Ovarian cancer 2019    CHEMO    Peritoneal carcinomatosis 2019       Past Surgical History:   Procedure Laterality Date    breast reduction  10/02/2018    CATARACT EXTRACTION Bilateral     2023     SECTION      X 1    COLONOSCOPY N/A 2021    Procedure: COLONOSCOPY;  Surgeon: Paulette Rojas MD;  Location: [facility];  Service: Gastroenterology;  Laterality: N/A;    CYSTOSCOPY W/ URETERAL STENT PLACEMENT Right 2019    Procedure: CYSTOSCOPY, WITH URETERAL STENT INSERTION;  Surgeon: Timmy Santiago IV, MD;  Location: [facility];  Service: Urology;  Laterality: Right;    CYSTOSCOPY W/ URETERAL STENT REMOVAL  10/04/2019    DILATION AND CURETTAGE OF UTERUS      HYSTERECTOMY      RALH for fibroids (still has  ovaries)    INSERTION OF VENOUS ACCESS PORT Left 2019    Procedure: INSERTION, VENOUS ACCESS PORT;  Surgeon: Ulisses Monzon MD;  Location: [facility] OR;  Service: General;  Laterality: Left;  Left internal jugular     LYSIS OF ADHESIONS OF URETER N/A 08/15/2019    Procedure: URETEROLYSIS;  Surgeon: Ismael Juarez MD;  Location: [facility] OR;  Service: OB/GYN;  Laterality: N/A;    OMENTECTOMY N/A 08/15/2019    Procedure: OMENTECTOMY;  Surgeon: Ismael Juarez MD;  Location: [facility] OR;  Service: OB/GYN;  Laterality: N/A;    RETROGRADE PYELOGRAPHY Right 2019    Procedure: PYELOGRAM, RETROGRADE;  Surgeon: Timmy Santiago IV, MD;  Location: [facility] OR;  Service: Urology;  Laterality: Right;    ROBOT-ASSISTED LAPAROSCOPIC SALPINGO-OOPHORECTOMY USING DA TIA XI Bilateral 08/15/2019    Procedure: XI ROBOTIC SALPINGO-OOPHORECTOMY;  Surgeon: Ismael Juarez MD;  Location: [facility];  Service: OB/GYN;  Laterality: Bilateral;    TOTAL REDUCTION MAMMOPLASTY  2018    TUBAL LIGATION         Family History   Problem Relation Name Age of Onset    Glaucoma Mother      No Known Problems Father      Colon cancer Brother      Breast cancer Maternal Aunt      Breast cancer Paternal Aunt      Ovarian cancer Paternal Aunt      Anesthesia problems Other cousin     Thrombophilia Neg Hx         Review of patient's allergies indicates:  No Known Allergies    Social History     Tobacco Use    Smoking status: Former     Current packs/day: 0.00     Average packs/day: 1 pack/day for 25.0 years (25.0 ttl pk-yrs)     Types: Cigarettes     Start date: 10/1/1993     Quit date: 10/1/2018     Years since quittin.7    Smokeless tobacco: Never    Tobacco comments:     States started quit 2 months ago after 30 years   Substance Use Topics    Alcohol use: Yes     Comment: occasionally  No alcohol 72h prior to sx    Drug use: No     Physical Exam     ECOG SCORE    0 - Fully active-able to carry on all pre-disease performance without restriction       Vitals:     07/05/24 0951   BP: 106/66   Pulse: 69   Temp: 97.6 °F (36.4 °C)       Physical Exam  Constitutional:       Appearance: Normal appearance. She is normal weight.   HENT:      Head: Normocephalic and atraumatic.   Eyes:      Conjunctiva/sclera: Conjunctivae normal.   Cardiovascular:      Rate and Rhythm: Normal rate.   Pulmonary:      Effort: Pulmonary effort is normal.   Lymphadenopathy:      Head:      Right side of head: No submental, submandibular, tonsillar, preauricular or occipital adenopathy.      Left side of head: No submental, submandibular, tonsillar, preauricular or occipital adenopathy.      Cervical: No cervical adenopathy.      Right cervical: No superficial cervical adenopathy.     Left cervical: No superficial cervical adenopathy.      Upper Body:      Right upper body: Supraclavicular adenopathy present.      Left upper body: No supraclavicular adenopathy.   Skin:     General: Skin is warm.   Neurological:      General: No focal deficit present.      Mental Status: She is alert and oriented to person, place, and time.   Psychiatric:         Mood and Affect: Mood normal.         Behavior: Behavior normal.       Labs   Labs:  Lab Visit on 07/05/2024   Component Date Value Ref Range Status    Phosphorus 07/05/2024 3.5  2.7 - 4.5 mg/dL Final    Magnesium 07/05/2024 1.5 (L)  1.6 - 2.6 mg/dL Final    Sodium 07/05/2024 143  136 - 145 mmol/L Final    Potassium 07/05/2024 3.8  3.5 - 5.1 mmol/L Final    Chloride 07/05/2024 104  95 - 110 mmol/L Final    CO2 07/05/2024 27  23 - 29 mmol/L Final    Glucose 07/05/2024 101  70 - 110 mg/dL Final    BUN 07/05/2024 21  8 - 23 mg/dL Final    Creatinine 07/05/2024 1.1  0.5 - 1.4 mg/dL Final    Calcium 07/05/2024 10.1  8.7 - 10.5 mg/dL Final    Total Protein 07/05/2024 7.2  6.0 - 8.4 g/dL Final    Albumin 07/05/2024 3.9  3.5 - 5.2 g/dL Final    Total Bilirubin 07/05/2024 0.4  0.1 - 1.0 mg/dL Final    Comment: For infants and newborns, interpretation of results should  be based  on gestational age, weight and in agreement with clinical  observations.    Premature Infant recommended reference ranges:  Up to 24 hours.............<8.0 mg/dL  Up to 48 hours............<12.0 mg/dL  3-5 days..................<15.0 mg/dL  6-29 days.................<15.0 mg/dL      Alkaline Phosphatase 07/05/2024 110  55 - 135 U/L Final    AST 07/05/2024 29  10 - 40 U/L Final    ALT 07/05/2024 25  10 - 44 U/L Final    eGFR 07/05/2024 54 (A)  >60 mL/min/1.73 m^2 Final    Anion Gap 07/05/2024 12  8 - 16 mmol/L Final    WBC 07/05/2024 8.29  3.90 - 12.70 K/uL Final    RBC 07/05/2024 4.29  4.00 - 5.40 M/uL Final    Hemoglobin 07/05/2024 12.1  12.0 - 16.0 g/dL Final    Hematocrit 07/05/2024 37.8  37.0 - 48.5 % Final    MCV 07/05/2024 88  82 - 98 fL Final    MCH 07/05/2024 28.2  27.0 - 31.0 pg Final    MCHC 07/05/2024 32.0  32.0 - 36.0 g/dL Final    RDW 07/05/2024 14.9 (H)  11.5 - 14.5 % Final    Platelets 07/05/2024 294  150 - 450 K/uL Final    MPV 07/05/2024 10.2  9.2 - 12.9 fL Final    Immature Granulocytes 07/05/2024 0.2  0.0 - 0.5 % Final    Gran # (ANC) 07/05/2024 5.3  1.8 - 7.7 K/uL Final    Immature Grans (Abs) 07/05/2024 0.02  0.00 - 0.04 K/uL Final    Comment: Mild elevation in immature granulocytes is non specific and   can be seen in a variety of conditions including stress response,   acute inflammation, trauma and pregnancy. Correlation with other   laboratory and clinical findings is essential.      Lymph # 07/05/2024 2.2  1.0 - 4.8 K/uL Final    Mono # 07/05/2024 0.5  0.3 - 1.0 K/uL Final    Eos # 07/05/2024 0.2  0.0 - 0.5 K/uL Final    Baso # 07/05/2024 0.07  0.00 - 0.20 K/uL Final    nRBC 07/05/2024 0  0 /100 WBC Final    Gran % 07/05/2024 64.4  38.0 - 73.0 % Final    Lymph % 07/05/2024 26.5  18.0 - 48.0 % Final    Mono % 07/05/2024 6.0  4.0 - 15.0 % Final    Eosinophil % 07/05/2024 2.1  0.0 - 8.0 % Final    Basophil % 07/05/2024 0.8  0.0 - 1.9 % Final    Differential Method 07/05/2024  Automated   Final        Imaging   CT Chest Abdomen Pelvis With IV Contrast (XPD) NO Oral Contrast 12/12/2023  Details    Reading Physician Reading Date Result Priority   Michele Kim MD  [phone] 12/12/2023 STAT     Narrative & Impression  EXAMINATION:  CT CHEST ABDOMEN PELVIS WITH IV CONTRAST (XPD)     CLINICAL HISTORY:  Hypokalemia,follow up ovarian cancer on treatment; evaluate treatment response for subsequent treatment planning.     TECHNIQUE:  Axial CT imaging was performed through the chest, abdomen and pelvis with  100cc  of intravenous contrast. Multiplanar reformats were performed and interpreted.     COMPARISON:  08/21/2023, 09/29/2022 and 09/24/2020     FINDINGS:  Chest:     The heart, great vessels, and mediastinal structures are within normal limits. No thoracic adenopathy.  Left-sided MediPort in the SVC.  Aortic atherosclerosis.     The lungs are clear bilaterally. No pleural effusions.     Abdomen:     The liver, spleen, kidneys, adrenal glands are within normal limits.  Stable 10 mm uncinate process pancreatic cyst.  No biliary ductal dilatation.     The gallbladder is unremarkable.     No free fluid, free air, or inflammatory change.     The small bowel is within normal limits.  Colonic diverticulosis.  Supraumbilical abdominal wall hernia containing a nondistended segment of the transverse colon.  No evidence of strangulation or incarceration.  The appendix is normal.     Aortic atherosclerosis without evidence of aneurysm.     Pelvis:     The urinary bladder is unremarkable. The uterus has been removed.  No free pelvic fluid or adenopathy.     No significant osseous abnormality is identified.  No suspicious osseous lesions.     Impression:     No CT evidence of recurrent or metastatic disease in the chest, abdomen or pelvis.     All CT scans at this facility are performed  using dose modulation techniques as appropriate to performed exam including the following:  automated exposure  control; adjustment of mA and/or kV according to the patients size (this includes techniques or standardized protocols for targeted exams where dose is matched to indication/reason for exam: i.e. extremities or head);  iterative reconstruction technique.        NM PET CT FDG SKULL BASE TO MID THIGH - 02/26/2024  FINDINGS:  Head/neck: There is normal physiologic uptake noted within the visualized brain parenchyma. No FDG avid adenopathy within the neck.     Chest: No FDG avid pulmonary nodules/masses. No FDG avid mediastinal, hilar or axillary lymph nodes.A left-sided MediPort catheter is noted.     Abdomen/Pelvis: Normal physiologic uptake noted within the liver, spleen, urinary tract, and bowel.The adrenals are unremarkable.  There are a couple of left periaortic retroperitoneal lymph nodes that demonstrate an SUV max of up to 2.8 and 3.1.  Findings are nonspecific.  There is diverticulosis seen scattered throughout the colon.  The midportion of the transverse colon is contained within a midline ventral hernia.  No evidence for bowel obstruction..     Skeletal: No FDG avid osseus lesions identified.     Impression:  1. Low level nonspecific uptake associated with a couple of left periaortic retroperitoneal lymph nodes.  It is possible these lymph nodes may just be reactive in nature.  Continued follow-up recommended.      NM PET CT FDG SKULL BASE TO MID THIGH - 04/08/2024  FINDINGS:  Quality of the study: Adequate.     SUV max of the liver parenchyma is 3.7     Neck: No abnormal FDG avidity.  No lymphadenopathy or mass.     Chest: No FDG avid mediastinal, hilar, or axillary lymphadenopathy.  No pleural effusion.  No pulmonary nodule.     Abdomen/pelvis: Interval resolution of the low level FDG avidity of the left para-aortic lymph nodes.  Lymph nodes are also smaller compared to the prior exam and normal in size on this exam.  No abnormal FDG avidity.  No ascites.  No lymphadenopathy     Skeletal structures: No  abnormal FDG avidity.  No focal lytic or sclerotic lesion in the osseous structures.     Physiologic uptake of the tracer is present within the brain, salivary glands, myocardium, GI and  tracts.     Incidental CT findings: No interval change in the umbilical hernia containing a loop of colon.  Left IJ MediPort with its tip in the SVC.    Impression:  No FDG PET/CT findings to suggest recurrent or metastatic disease.  Interval resolution of the low level FDG avidity seen in the left para-aortic retroperitoneal lymph nodes on the prior exam.      NM PET CT FDG SKULL BASE TO MID THIGH - 07/02/2024  CLINICAL HISTORY:  surveillance; Drug-induced polyneuropathy     TECHNIQUE:  13.71 mCi of F18-FDG was administered intravenously.  After an approximately 60 min distribution time, PET/CT images were acquired from the skull base to the mid thigh. Transmission images were acquired to correct for attenuation using a whole body low-dose CT scan without contrast.     COMPARISON:  Prior PET CTs, most recent 04/08/2024; CT abdomen/pelvis 05/29/2024     FINDINGS:  Head/neck: New right supraclavicular/neck base FDG avid lymph nodes, SUV max 11.  Otherwise normal physiologic activity.     Chest: New enlarged left subpectoral FDG avid lymph node, SUV max 11.  No FDG avid right axillary adenopathy.  New mediastinal FDG avid adenopathy (paratracheal, prevascular subcarinal).  SUV max 15 (pre-vascular).  New left internal mammary FDG avid lymph node, SUV max 6.5.  New anterior pericardial FDG avid nodule, axial fused image 175.  New left paraspinal FDG avid nodule, axial fused image 176.     Trace bilateral pleural effusions.  No FDG avid pulmonary nodule appreciated.     Abdomen/pelvis: Small FDG avid left upper quadrant omental nodularity/thickening, SUV max 3.5 (axial fused image 182 through 210).  Left pericolic gutter FDG avid soft tissue nodule, SUV max 9 (axial fused image 273).  Suspected small FDG avid nodules/lymph nodes  closely associated with small bowel loops within the central upper pelvis, axial fused image 301 and 316.  FDG avid right iliac and  lymph nodes (axial fused image 324 and 331), SUV max 6.4.     Trace nonspecific pelvic free fluid.  Colonic diverticulosis.  Bowel containing anterior ventral hernia as noted on prior CT.     Skeletal/subcutaneous structures: No suspicious FDG avid osseous abnormality.     Impression:  Disease progression with neck base/chest, abdomen and pelvis FDG avid disease now present as above.     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  Patient was then treated with Avastin maintenance  08/21/2023 CT CAP: Marked interval disease progression with severe peritoneal carcinomatosis as detailed above.  12/12/2023 CT CAP: No CT evidence of recurrent or metastatic disease in the chest, abdomen or pelvis.  : 7 >> 21 >> 32 >> 64 >> 11  Started Carbo/Taxol 09/29/2023  C1 no complications  C2 and C3 (11/10/23): reaction consisting of feeling presyncopal, SOB, sweating; vitals significant for hypoxia and hypotension; evaluated by AI and recs for desensitization  C4 -C6with desensitization  Per medical record, patient declined treatment until after the new year  CT CAP 12/12/23 showed CLYDE  PET-CT 02/26/24: Low level nonspecific uptake associated with a couple of left periaortic retroperitoneal lymph nodes.  It is possible these lymph nodes may just be reactive in nature  PET-CT 04/08/2024:  No FDG PET/CT findings to suggest recurrent or metastatic disease. Interval resolution of the low level FDG avidity seen in the left para-aortic retroperitoneal lymph nodes on the prior  exam.   Plan  PET-CT 07/02/24: Disease progression with neck base/chest, abdomen and pelvis FDG avid disease now present as above.   Discussed that this is likely progression of disease but we need to biopsy the lymph nodes for pathologic confirmation and will obtain Tumor NGS/Blood NGS  Advised follow up with Gyn Onc        Chronic Medical Conditions  Osteoarthritis  HTN  Anxiety        Med Onc Chart Routing      Follow up with physician 2 weeks. review pathology   Follow up with [facility]    Infusion scheduling note    Injection scheduling note    Labs LDH and reticulocytes   Scheduling:  Preferred lab:  Lab interval:  other: labs ordered by Dr. Pugh   Imaging    Pharmacy appointment    Other referrals       Additional referrals needed  IR for LN biopsy           The patient was seen, interviewed and examined. Pertinent lab and radiologic studies were reviewed. Pt instructed to call should they develop concerning signs/symptoms or have further questions.        Portions of the record may have been created with voice recognition software. Occasional wrong-word or sound-a-likez substitutions may have occurred due to the inherent limitations of voice recognition software. Read the chart carefully and recognize, using context, where substitutions have occurred.      Torri Pugh MD    Hematology/Oncology

## 2024-07-10 ENCOUNTER — PATIENT MESSAGE (OUTPATIENT)
Dept: HEMATOLOGY/ONCOLOGY | Facility: CLINIC | Age: 69
End: 2024-07-10
Payer: MEDICARE

## 2024-07-11 ENCOUNTER — LAB VISIT (OUTPATIENT)
Dept: LAB | Facility: HOSPITAL | Age: 69
End: 2024-07-11
Attending: INTERNAL MEDICINE
Payer: MEDICARE

## 2024-07-11 DIAGNOSIS — R59.1 LYMPHADENOPATHY: ICD-10-CM

## 2024-07-11 DIAGNOSIS — C56.3 MALIGNANT NEOPLASM OF BILATERAL OVARIES: ICD-10-CM

## 2024-07-11 LAB — TEMPUS BLOOD ADD-ON: NORMAL

## 2024-07-11 PROCEDURE — 36415 COLL VENOUS BLD VENIPUNCTURE: CPT | Mod: HCNC | Performed by: INTERNAL MEDICINE

## 2024-07-12 ENCOUNTER — TELEPHONE (OUTPATIENT)
Dept: RADIOLOGY | Facility: HOSPITAL | Age: 69
End: 2024-07-12
Payer: MEDICARE

## 2024-07-12 DIAGNOSIS — R59.1 LYMPHADENOPATHY: Primary | ICD-10-CM

## 2024-07-12 NOTE — TELEPHONE ENCOUNTER
Interventional Radiology  Scheduled 9:30AM lymph node biopsy for 7/18/24 w/patient.  Instructed pt. to arrive by 8:00AM to the hospital ([address]/ Entrance 2) off [address] in [place] and check in at Outpatient Registration located on the first floor.  She must have a ride and NPO after midnight the night before.  I explained that she can take her regular morning medications with a small sip of water.  Pt. is to stop taking Eliquis on 7/15/24.  She denies taking other blood thinners, NSAIDS, fish oil, or GLP-1/GIP agonists.  I gave patient our direct callback number [phone] and she verbalized understanding of all instructions.   Never

## 2024-07-15 ENCOUNTER — HOSPITAL ENCOUNTER (OUTPATIENT)
Dept: RADIOLOGY | Facility: HOSPITAL | Age: 69
Discharge: HOME | End: 2024-07-15
Attending: FAMILY MEDICINE
Payer: MEDICARE

## 2024-07-15 ENCOUNTER — OFFICE VISIT (OUTPATIENT)
Dept: INTERNAL MEDICINE | Facility: CLINIC | Age: 69
End: 2024-07-15
Payer: MEDICARE

## 2024-07-15 DIAGNOSIS — Z90.722 S/P BSO (BILATERAL SALPINGO-OOPHORECTOMY): ICD-10-CM

## 2024-07-15 DIAGNOSIS — Z12.31 ENCOUNTER FOR SCREENING MAMMOGRAM FOR BREAST CANCER: ICD-10-CM

## 2024-07-15 DIAGNOSIS — R06.09 DYSPNEA ON EXERTION: Primary | ICD-10-CM

## 2024-07-15 DIAGNOSIS — C56.3 OVARIAN CANCER, BILATERAL: ICD-10-CM

## 2024-07-15 PROCEDURE — 77067 SCR MAMMO BI INCL CAD: CPT | Mod: 26,HCNC,, | Performed by: RADIOLOGY

## 2024-07-15 PROCEDURE — 99214 OFFICE O/P EST MOD 30 MIN: CPT | Mod: HCNC,95,, | Performed by: FAMILY MEDICINE

## 2024-07-15 PROCEDURE — 77063 BREAST TOMOSYNTHESIS BI: CPT | Mod: TC,HCNC

## 2024-07-15 PROCEDURE — 77063 BREAST TOMOSYNTHESIS BI: CPT | Mod: 26,HCNC,, | Performed by: RADIOLOGY

## 2024-07-15 NOTE — PROGRESS NOTES
Casie Frias Givens  07/15/2024  [id number]    Rossana Ang MD  Patient Care Team:  Rossana Ang MD as PCP - General (Family Medicine)  Radha Foley LPN as Care Coordinator (Internal Medicine)  Gerri Meyers RD (Inactive) as Dietitian (Nutrition)    The patient location is: home  The chief complaint leading to consultation is: follow up, sob    Visit type: audiovisual    Face to Face time with patient: 11;50  20 minutes of total time spent on the encounter, which includes face to face time and non-face to face time preparing to see the patient (eg, review of tests), Obtaining and/or reviewing separately obtained history, Documenting clinical information in the electronic or other health record, Independently interpreting results (not separately reported) and communicating results to the patient/family/caregiver, or Care coordination (not separately reported).         Each patient to whom he or she provides medical services by telemedicine is:  (1) informed of the relationship between the physician and patient and the respective role of any other health care provider with respect to management of the patient; and (2) notified that he or she may decline to receive medical services by telemedicine and may withdraw from such care at any time.    Notes:         Visit Type:a scheduled visit following a recent hospitalization    Chief Complaint: SOB follow up  History of Present Illness:  68 year old  Follow up    Saw my [facility] in May, after admission for Pneumonia.  She has history of ovarian can.  Since that visit, she has see her Heme Onc and GYN ONC.  At her Hosp follow, c/o LLQ pain. CT done, showed left thrombosis of ovarian vein. Heme Onc recommened OAC.   She saw vascular CVT due to lympedema, no clot in legs.    PET CT done in July  INDINGS:  Head/neck: New right supraclavicular/neck base FDG avid lymph nodes, SUV max 11.  Otherwise normal physiologic activity.     Chest: New enlarged left  subpectoral FDG avid lymph node, SUV max 11.  No FDG avid right axillary adenopathy.  New mediastinal FDG avid adenopathy (paratracheal, prevascular subcarinal).  SUV max 15 (pre-vascular).  New left internal mammary FDG avid lymph node, SUV max 6.5.  New anterior pericardial FDG avid nodule, axial fused image 175.  New left paraspinal FDG avid nodule, axial fused image 176.     Trace bilateral pleural effusions.  No FDG avid pulmonary nodule appreciated.     Abdomen/pelvis: Small FDG avid left upper quadrant omental nodularity/thickening, SUV max 3.5 (axial fused image 182 through 210).  Left pericolic gutter FDG avid soft tissue nodule, SUV max 9 (axial fused image 273).  Suspected small FDG avid nodules/lymph nodes closely associated with small bowel loops within the central upper pelvis, axial fused image 301 and 316.  FDG avid right iliac and  lymph nodes (axial fused image 324 and 331), SUV max 6.4.     Trace nonspecific pelvic free fluid.  Colonic diverticulosis.  Bowel containing anterior ventral hernia as noted on prior CT.     Skeletal/subcutaneous structures: No suspicious FDG avid osseous abnormality.     Impression:     Disease progression with neck base/chest, abdomen and pelvis FDG avid disease now present as above.    She will have node biopsy this week for definative Dx.  Answers submitted by the patient for this visit:  Review of Systems Questionnaire (Submitted on 7/15/2024)  activity change: Yes  unexpected weight change: No  rhinorrhea: No  trouble swallowing: No  visual disturbance: No  chest tightness: No  polyuria: Yes  difficulty urinating: No  menstrual problem: No  joint swelling: No  arthralgias: Yes  confusion: No  dysphoric mood: No    History:  Past Medical History:   Diagnosis Date    Anemia     Anxiety     Arthritis     knees    Cancer     ovarian    CHF (congestive heart failure)     Hx antineoplastic chemotherapy     last 6/2019    Hyperlipemia     Hypertension     Neck  pain     Ovarian cancer 2019    CHEMO    Peritoneal carcinomatosis 2019     Past Surgical History:   Procedure Laterality Date    breast reduction  10/02/2018    CATARACT EXTRACTION Bilateral     2023     SECTION      X 1    COLONOSCOPY N/A 2021    Procedure: COLONOSCOPY;  Surgeon: Paulette Rojas MD;  Location: [facility] ENDO;  Service: Gastroenterology;  Laterality: N/A;    CYSTOSCOPY W/ URETERAL STENT PLACEMENT Right 2019    Procedure: CYSTOSCOPY, WITH URETERAL STENT INSERTION;  Surgeon: Timmy Santiago IV, MD;  Location: [facility] OR;  Service: Urology;  Laterality: Right;    CYSTOSCOPY W/ URETERAL STENT REMOVAL  10/04/2019    DILATION AND CURETTAGE OF UTERUS      HYSTERECTOMY      RALH for fibroids (still has ovaries)    INSERTION OF VENOUS ACCESS PORT Left 2019    Procedure: INSERTION, VENOUS ACCESS PORT;  Surgeon: Ulisses Monzon MD;  Location: [facility] OR;  Service: General;  Laterality: Left;  Left internal jugular     LYSIS OF ADHESIONS OF URETER N/A 08/15/2019    Procedure: URETEROLYSIS;  Surgeon: Ismael Juarez MD;  Location: [facility] OR;  Service: OB/GYN;  Laterality: N/A;    OMENTECTOMY N/A 08/15/2019    Procedure: OMENTECTOMY;  Surgeon: Ismael Juarez MD;  Location: [facility] OR;  Service: OB/GYN;  Laterality: N/A;    RETROGRADE PYELOGRAPHY Right 2019    Procedure: PYELOGRAM, RETROGRADE;  Surgeon: Timmy Santiago IV, MD;  Location: [facility] OR;  Service: Urology;  Laterality: Right;    ROBOT-ASSISTED LAPAROSCOPIC SALPINGO-OOPHORECTOMY USING DA TIA XI Bilateral 08/15/2019    Procedure: XI ROBOTIC SALPINGO-OOPHORECTOMY;  Surgeon: Ismael Juarez MD;  [facility];  Service: OB/GYN;  Laterality: Bilateral;    TOTAL REDUCTION MAMMOPLASTY  2018    TUBAL LIGATION       Family History   Problem Relation Name Age of Onset    Glaucoma Mother      No Known Problems Father      Colon cancer Brother      Breast cancer Maternal Aunt      Breast cancer Paternal Aunt      Ovarian  cancer Paternal Aunt      Anesthesia problems Other cousin     Thrombophilia Neg Hx       Social History     Socioeconomic History    Marital status:    Tobacco Use    Smoking status: Former     Current packs/day: 0.00     Average packs/day: 1 pack/day for 25.0 years (25.0 ttl pk-yrs)     Types: Cigarettes     Start date: 10/1/1993     Quit date: 10/1/2018     Years since quittin.7    Smokeless tobacco: Never    Tobacco comments:     States started quit 2 months ago after 30 years   Substance and Sexual Activity    Alcohol use: Yes     Comment: occasionally  No alcohol 72h prior to sx    Drug use: No    Sexual activity: Not Currently     Partners: Male     Comment: hyst; mut monog   Social History Narrative    Live w/ spouse and 2 dogs      Social Determinants of Health     Financial Resource Strain: Low Risk  (2023)    Overall Financial Resource Strain (CARDIA)     Difficulty of Paying Living Expenses: Not hard at all   Food Insecurity: No Food Insecurity (2023)    Hunger Vital Sign     Worried About Running Out of Food in the Last Year: Never true     Ran Out of Food in the Last Year: Never true   Transportation Needs: No Transportation Needs (2023)    PRAPARE - Transportation     Lack of Transportation (Medical): No     Lack of Transportation (Non-Medical): No   Physical Activity: Unknown (2023)    Exercise Vital Sign     Days of Exercise per Week: 0 days   Stress: No Stress Concern Present (2023)    [place] [employer] - Occupational Stress Questionnaire     Feeling of Stress : Not at all   Housing Stability: Unknown (2023)    Housing Stability Vital Sign     Unable to Pay for Housing in the Last Year: No     Unstable Housing in the Last Year: No     Patient Active Problem List   Diagnosis    Hypertension    Osteoarthritis of both knees    Hyperlipidemia    Peritoneal carcinomatosis    Morbid obesity with BMI of 40.0-44.9, adult    Status post  placement of ureteral stent    Abnormal ECG    Dyspnea on exertion    Pulmonary HTN    Ovarian cancer, bilateral    S/P BSO (bilateral salpingo-oophorectomy)    Encounter for antineoplastic chemotherapy    Anxiety    DDD (degenerative disc disease), cervical    Family history of colon cancer    Acute right-sided thoracic back pain    Acute right-sided low back pain without sciatica    Sciatic nerve pain    Vasovagal reaction    Drug reaction    Infusion reaction    Left groin pain    Pruritus    Decreased functional mobility and endurance    Decreased range of motion of lumbar spine    Proximal muscle weakness    Sleep-disordered breathing    Chemotherapy-induced peripheral neuropathy    Pneumonia     Review of patient's allergies indicates:  No Known Allergies    The following were reviewed at this visit: active problem list, medication list, allergies, family history, social history, and health maintenance.    Medications:  Current Outpatient Medications on File Prior to Visit   Medication Sig Dispense Refill    albuterol (PROVENTIL/VENTOLIN HFA) 90 mcg/actuation inhaler Inhale 2 puffs into the lungs every 4 (four) hours as needed for Wheezing. Rescue 18 g 1    ALPRAZolam (XANAX) 0.25 MG tablet Take 1 tablet (0.25 mg total) by mouth 3 (three) times daily as needed for Anxiety. 60 tablet 2    amLODIPine (NORVASC) 5 MG tablet Take 2 tablets (10 mg total) by mouth once daily. 180 tablet 3    apixaban (ELIQUIS DVT-PE TREAT 30D START) 5 mg (74 tabs) DsPk For the first 7 days take two 5 mg tablets twice daily.  After 7 days take one 5 mg tablet twice daily. 74 tablet 0    atenoloL (TENORMIN) 25 MG tablet Take 1 tablet (25 mg total) by mouth once daily. 90 tablet 2    biotin 1 mg tablet Take 1,000 mcg by mouth once daily.       cetirizine (ZYRTEC) 10 MG tablet Take 1 tablet (10 mg total) by mouth once daily. (Patient taking differently: Take 10 mg by mouth as needed for Allergies.) 30 tablet 5    diclofenac sodium  (VOLTAREN) 1 % Gel Apply once a day to back as needed 100 g 1    EPINEPHrine (EPIPEN) 0.3 mg/0.3 mL AtIn Inject 0.3 mLs (0.3 mg total) into the muscle once. for 1 dose 2 each 0    fluticasone propionate (FLONASE) 50 mcg/actuation nasal spray 1 spray (50 mcg total) by Each Nostril route every 12 (twelve) hours as needed for Rhinitis. 16 g 0    furosemide (LASIX) 40 MG tablet Take 1 tablet (40 mg total) by mouth once daily. 30 tablet 11    gabapentin (NEURONTIN) 300 MG capsule Take 2 capsules (600 mg total) by mouth 3 (three) times daily. 180 capsule 3    ginkgo biloba 40 mg Tab Take 40 mg by mouth once daily.      multivitamin with minerals tablet Take 1 tablet by mouth once daily.       olmesartan-hydrochlorothiazide (BENICAR HCT) 40-25 mg per tablet Take 1 tablet by mouth once daily. 90 tablet 1    potassium chloride SA (K-DUR,KLOR-CON) 20 MEQ tablet Take 1 tablet (20 mEq total) by mouth 2 (two) times daily. (Patient not taking: Reported on 7/5/2024) 2 tablet 0    rosuvastatin (CRESTOR) 10 MG tablet Take 1 tablet (10 mg total) by mouth once daily. 90 tablet 1    sertraline (ZOLOFT) 25 MG tablet Take 1 tablet (25 mg total) by mouth once daily. 90 tablet 0     Current Facility-Administered Medications on File Prior to Visit   Medication Dose Route Frequency Provider Last Rate Last Admin    alteplase injection 2 mg  2 mg Intra-Catheter PRN            Medications have been reviewed and reconciled with patient at this visit.  Barriers to medications reviewed with patient.    Adverse reactions to current medications reviewed with patient..    Over the counter medications reviewed and reconciled with patient.    Exam:  Wt Readings from Last 3 Encounters:   07/05/24 111.8 kg (246 lb 9.4 oz)   06/03/24 114.3 kg (252 lb)   05/28/24 113.8 kg (250 lb 14.1 oz)     Temp Readings from Last 3 Encounters:   07/05/24 97.6 °F (36.4 °C) (Temporal)   05/28/24 98.4 °F (36.9 °C) (Tympanic)   05/20/24 98.1 °F (36.7 °C) (Oral)     BP  Readings from Last 3 Encounters:   07/05/24 106/66   06/03/24 (!) 114/56   05/28/24 (!) 124/58     Pulse Readings from Last 3 Encounters:   07/05/24 69   06/03/24 73   05/28/24 83     There is no height or weight on file to calculate BMI.      Review of Systems   Constitutional:  Positive for malaise/fatigue.   HENT:  Negative for hearing loss.    Eyes:  Negative for discharge.   Respiratory:  Positive for shortness of breath. Negative for wheezing.    Cardiovascular:  Negative for chest pain and palpitations.   Gastrointestinal:  Negative for blood in stool, constipation, diarrhea and vomiting.   Genitourinary:  Negative for dysuria and hematuria.   Musculoskeletal:  Positive for neck pain.   Neurological:  Negative for weakness and headaches.   Endo/Heme/Allergies:  Negative for polydipsia.     Physical Exam  Nursing note reviewed.   Pulmonary:      Effort: Pulmonary effort is normal. No respiratory distress.   Neurological:      Mental Status: She is alert and oriented to person, place, and time.   Psychiatric:         Mood and Affect: Mood normal.         Behavior: Behavior normal.         Thought Content: Thought content normal.         Judgment: Judgment normal.         Laboratory Reviewed ({Yes)  Lab Results   Component Value Date    WBC 8.29 07/05/2024    HGB 12.1 07/05/2024    HCT 37.8 07/05/2024     07/05/2024    CHOL 162 06/06/2024    TRIG 87 06/06/2024    HDL 47 06/06/2024    ALT 25 07/05/2024    AST 29 07/05/2024     07/05/2024    K 3.8 07/05/2024     07/05/2024    CREATININE 1.1 07/05/2024    BUN 21 07/05/2024    CO2 27 07/05/2024    TSH 2.460 09/20/2023    INR 1.0 01/28/2019    HGBA1C 5.8 (H) 06/12/2023       Diagnoses and all orders for this visit:    Dyspnea on exertion    S/P BSO (bilateral salpingo-oophorectomy)    Ovarian cancer, bilateral        She has harinder biopsy this week  Get Dx for abnl PET    Last chest xray clear  May need to repeat imaging if SOB continues/   [facility] pulm  follow up next month  NO using JERALD/CPAP    Last Echo stable    MMG today    Labs reviewed from 2 weeks ago Okay  CBC and CMP    Await disposition after biopsy          Care Plan/Goals: Reviewed    Goals          Patient Stated      Blood Pressure < 130/80 (pt-stated)        Other      Exercise at least 150 minutes per week.       Maintain a low sodium diet       American Heart Association (AHA) guidelines recommend less than 1500 mg of dietary salt per day.        Take at least one BP reading per week at various times of the day             Follow up: No follow-ups on file.    After visit summary was printed and given to patient upon discharge today.  Patient goals and care plan are included in After Visit Summary.

## 2024-07-17 ENCOUNTER — TELEPHONE (OUTPATIENT)
Dept: RADIOLOGY | Facility: HOSPITAL | Age: 69
End: 2024-07-17
Payer: MEDICARE

## 2024-07-17 NOTE — TELEPHONE ENCOUNTER
Exercise Session Details Interventional Radiology:    Left message for pt to be here at [facility] on [address] at 8am, to be NPO after midnight, no blood thinners or NSAIDs, should have stopped Eliquis yesterday, and to have someone to drive them home from procedure or phone number of someone to pick them up.

## 2024-07-18 ENCOUNTER — HOSPITAL ENCOUNTER (OUTPATIENT)
Dept: RADIOLOGY | Facility: HOSPITAL | Age: 69
Discharge: HOME | End: 2024-07-18
Attending: INTERNAL MEDICINE
Payer: MEDICARE

## 2024-07-18 VITALS
BODY MASS INDEX: 38.92 KG/M2 | HEART RATE: 66 BPM | SYSTOLIC BLOOD PRESSURE: 117 MMHG | RESPIRATION RATE: 15 BRPM | HEIGHT: 67 IN | DIASTOLIC BLOOD PRESSURE: 58 MMHG | OXYGEN SATURATION: 97 % | WEIGHT: 248 LBS

## 2024-07-18 DIAGNOSIS — R59.1 LYMPHADENOPATHY: ICD-10-CM

## 2024-07-18 LAB
INR PPP: 0.9 (ref 0.8–1.2)
PROTHROMBIN TIME: 10.9 SEC (ref 9–12.5)

## 2024-07-18 PROCEDURE — 88184 FLOWCYTOMETRY/ TC 1 MARKER: CPT | Mod: HCNC | Performed by: PATHOLOGY

## 2024-07-18 PROCEDURE — 88185 FLOWCYTOMETRY/TC ADD-ON: CPT | Mod: HCNC | Performed by: PATHOLOGY

## 2024-07-18 PROCEDURE — 88333 PATH CONSLTJ SURG CYTO XM 1: CPT | Mod: HCNC | Performed by: PATHOLOGY

## 2024-07-18 PROCEDURE — 63600175 PHARM REV CODE 636 W HCPCS: Mod: HCNC | Performed by: RADIOLOGY

## 2024-07-18 PROCEDURE — 77012 CT SCAN FOR NEEDLE BIOPSY: CPT | Mod: 26,HCNC,LT, | Performed by: RADIOLOGY

## 2024-07-18 PROCEDURE — 38505 NEEDLE BIOPSY LYMPH NODES: CPT | Mod: HCNC,LT,, | Performed by: RADIOLOGY

## 2024-07-18 PROCEDURE — 88307 TISSUE EXAM BY PATHOLOGIST: CPT | Mod: HCNC | Performed by: PATHOLOGY

## 2024-07-18 PROCEDURE — 85610 PROTHROMBIN TIME: CPT | Mod: HCNC | Performed by: INTERNAL MEDICINE

## 2024-07-18 PROCEDURE — 88341 IMHCHEM/IMCYTCHM EA ADD ANTB: CPT | Mod: HCNC | Performed by: PATHOLOGY

## 2024-07-18 PROCEDURE — 25000003 PHARM REV CODE 250: Mod: HCNC | Performed by: RADIOLOGY

## 2024-07-18 PROCEDURE — A4215 STERILE NEEDLE: HCPCS | Mod: HCNC

## 2024-07-18 PROCEDURE — 88189 FLOWCYTOMETRY/READ 16 & >: CPT | Mod: HCNC,,, | Performed by: PATHOLOGY

## 2024-07-18 PROCEDURE — 88342 IMHCHEM/IMCYTCHM 1ST ANTB: CPT | Mod: HCNC | Performed by: PATHOLOGY

## 2024-07-18 RX ORDER — FENTANYL CITRATE 50 UG/ML
INJECTION, SOLUTION INTRAMUSCULAR; INTRAVENOUS CODE/TRAUMA/SEDATION MEDICATION
Status: COMPLETED | OUTPATIENT
Start: 2024-07-18 | End: 2024-07-18

## 2024-07-18 RX ORDER — LIDOCAINE HYDROCHLORIDE 10 MG/ML
INJECTION INFILTRATION; PERINEURAL CODE/TRAUMA/SEDATION MEDICATION
Status: COMPLETED | OUTPATIENT
Start: 2024-07-18 | End: 2024-07-18

## 2024-07-18 RX ORDER — MIDAZOLAM HYDROCHLORIDE 1 MG/ML
INJECTION, SOLUTION INTRAMUSCULAR; INTRAVENOUS CODE/TRAUMA/SEDATION MEDICATION
Status: COMPLETED | OUTPATIENT
Start: 2024-07-18 | End: 2024-07-18

## 2024-07-18 RX ADMIN — MIDAZOLAM 1 MG: 1 INJECTION INTRAMUSCULAR; INTRAVENOUS at 10:07

## 2024-07-18 RX ADMIN — FENTANYL CITRATE 50 MCG: 0.05 INJECTION, SOLUTION INTRAMUSCULAR; INTRAVENOUS at 10:07

## 2024-07-18 RX ADMIN — LIDOCAINE HYDROCHLORIDE 5 ML: 10 INJECTION, SOLUTION INFILTRATION; PERINEURAL at 10:07

## 2024-07-18 NOTE — DISCHARGE SUMMARY
[facility] (Hospital)  Discharge Note  Short Stay    CT Biopsy Lymph Node (xpd)      OUTCOME: Patient tolerated treatment/procedure well without complication and is now ready for discharge.    DISPOSITION: Home or Self Care    FINAL DIAGNOSIS:  <principal problem not specified>    FOLLOWUP: In clinic    DISCHARGE INSTRUCTIONS:  No discharge procedures on file.     TIME SPENT ON DISCHARGE: 15 minutes    Pre Op Diagnosis: left sub pec nodule     Post Op Diagnosis: same     Procedure:  biopsy     Procedure performed by: Kristen VELARDE, Maurisio GARCIA     Written Informed Consent Obtained: Yes     Specimen Removed:  yes     Estimated Blood Loss:  minimal     Findings: Local anesthesia     Sedation:  yes     The patient tolerated the procedure well and there were no complications.      Disposition:  F/U in clinic or with ordering physician    Discharge instructions:  Light activity for 24 hours.  Remove band aid in 24 hours.  No baths (showers are appropriate).      Sterile technique was performed in the left anterior chest, lidocaine was used as a local anesthetic.  Multiple samples taken percutaneously from the nodule and RPMI.  Pt tolerated the procedure well without immediate complications.  Please see radiologist report for details. F/u with PCP and/or ordering physician.

## 2024-07-18 NOTE — PLAN OF CARE
PATIENT READY FOR DISCHARGE. VSS. DRESSING ON UPPER MID CHEST DRY AND INTACT WITHOUT BLEEDING OR HEMATOMA. DISCHARGE INSTRUCTIONS GIVEN TO PATIENT. PIV DC'D UP AND DRESSED. DISCHARGED PER W/C. CARE OF PATIENT RELEASED TO PATIENTS SISTER IMTIAZ.

## 2024-07-19 LAB
FLOW CYTOMETRY ANTIBODIES ANALYZED - LYMPH NODE: NORMAL
FLOW CYTOMETRY COMMENT - LYMPH NODE: NORMAL
FLOW CYTOMETRY INTERPRETATION - LYMPH NODE: NORMAL

## 2024-07-22 LAB
COMMENT: ABNORMAL
FINAL PATHOLOGIC DIAGNOSIS: ABNORMAL
GROSS: ABNORMAL
Lab: ABNORMAL
MICROSCOPIC EXAM: ABNORMAL

## 2024-07-29 ENCOUNTER — INFUSION (OUTPATIENT)
Dept: INFUSION THERAPY | Facility: HOSPITAL | Age: 69
End: 2024-07-29
Attending: INTERNAL MEDICINE
Payer: MEDICARE

## 2024-07-29 VITALS
DIASTOLIC BLOOD PRESSURE: 66 MMHG | RESPIRATION RATE: 16 BRPM | OXYGEN SATURATION: 95 % | SYSTOLIC BLOOD PRESSURE: 108 MMHG | TEMPERATURE: 98 F | HEART RATE: 81 BPM

## 2024-07-29 DIAGNOSIS — C56.3 OVARIAN CANCER, BILATERAL: Primary | ICD-10-CM

## 2024-07-29 PROCEDURE — 63600175 PHARM REV CODE 636 W HCPCS: Mod: HCNC | Performed by: INTERNAL MEDICINE

## 2024-07-29 PROCEDURE — 96523 IRRIG DRUG DELIVERY DEVICE: CPT | Mod: HCNC

## 2024-07-29 PROCEDURE — A4216 STERILE WATER/SALINE, 10 ML: HCPCS | Mod: HCNC | Performed by: INTERNAL MEDICINE

## 2024-07-29 PROCEDURE — 25000003 PHARM REV CODE 250: Mod: HCNC | Performed by: INTERNAL MEDICINE

## 2024-07-29 RX ORDER — HEPARIN 100 UNIT/ML
500 SYRINGE INTRAVENOUS
Status: DISCONTINUED | OUTPATIENT
Start: 2024-07-29 | End: 2024-07-29 | Stop reason: HOSPADM

## 2024-07-29 RX ORDER — SODIUM CHLORIDE 0.9 % (FLUSH) 0.9 %
10 SYRINGE (ML) INJECTION
OUTPATIENT
Start: 2024-07-29

## 2024-07-29 RX ORDER — HEPARIN 100 UNIT/ML
500 SYRINGE INTRAVENOUS
OUTPATIENT
Start: 2024-07-29

## 2024-07-29 RX ORDER — SODIUM CHLORIDE 0.9 % (FLUSH) 0.9 %
10 SYRINGE (ML) INJECTION
Status: DISCONTINUED | OUTPATIENT
Start: 2024-07-29 | End: 2024-07-29 | Stop reason: HOSPADM

## 2024-07-29 RX ADMIN — HEPARIN 500 UNITS: 100 SYRINGE at 08:07

## 2024-07-29 RX ADMIN — SODIUM CHLORIDE, PRESERVATIVE FREE 10 ML: 5 INJECTION INTRAVENOUS at 08:07

## 2024-07-29 NOTE — NURSING
"Pt here for Mediport Flush. Left chestwall mediport accessed with a 20g 1" rodriguez via sterile technique.  Excellent blood return noted.  Flushed with 10ml NS and 5 ml heparin solution.  Needle D/C, site without redness, swelling, or drainage noted.  Dressing applied.  Patient tolerated well.  Patient to return to clinic in 6 weeks.     "

## 2024-07-30 DIAGNOSIS — Z00.00 ENCOUNTER FOR MEDICARE ANNUAL WELLNESS EXAM: ICD-10-CM

## 2024-07-31 ENCOUNTER — TUMOR BOARD CONFERENCE (OUTPATIENT)
Dept: GYNECOLOGIC ONCOLOGY | Facility: CLINIC | Age: 69
End: 2024-07-31
Payer: MEDICARE

## 2024-08-01 ENCOUNTER — OFFICE VISIT (OUTPATIENT)
Dept: HEMATOLOGY/ONCOLOGY | Facility: CLINIC | Age: 69
End: 2024-08-01
Payer: MEDICARE

## 2024-08-01 VITALS
SYSTOLIC BLOOD PRESSURE: 114 MMHG | HEART RATE: 77 BPM | OXYGEN SATURATION: 97 % | HEIGHT: 67 IN | TEMPERATURE: 98 F | WEIGHT: 248 LBS | DIASTOLIC BLOOD PRESSURE: 68 MMHG | BODY MASS INDEX: 38.92 KG/M2

## 2024-08-01 DIAGNOSIS — C56.3 OVARIAN CANCER, BILATERAL: ICD-10-CM

## 2024-08-01 DIAGNOSIS — C56.3 MALIGNANT NEOPLASM OF BOTH OVARIES: ICD-10-CM

## 2024-08-01 DIAGNOSIS — C56.3 OVARIAN CANCER, BILATERAL: Primary | ICD-10-CM

## 2024-08-01 DIAGNOSIS — C78.6 PERITONEAL CARCINOMATOSIS: Primary | ICD-10-CM

## 2024-08-01 PROCEDURE — 99999 PR PBB SHADOW E&M-EST. PATIENT-LVL III: CPT | Mod: PBBFAC,HCNC,, | Performed by: INTERNAL MEDICINE

## 2024-08-01 NOTE — H&P (VIEW-ONLY)
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 female who presents to clinic for follow up.    I reviewed with her LN biopsy report as well as the tumor board consensus and management recommendations as outlined below.  She is in agreement with whatever regimen we recommend.  All of her questions were answered satisfactorily.    Review of Systems   Constitutional:  Positive for diaphoresis and fatigue. Negative for activity change, appetite change, chills, fever and unexpected weight change.   HENT:  Negative for nosebleeds.    Respiratory:  Negative for shortness of breath.    Cardiovascular:  Negative for leg swelling.   Musculoskeletal:  Negative for back pain.   Skin:  Negative for rash.   Neurological:  Positive for numbness. Negative for dizziness, weakness, light-headedness and headaches.   Hematological:  Does not bruise/bleed easily.   Psychiatric/Behavioral:  The patient is not nervous/anxious.      History     Oncology History   Peritoneal carcinomatosis   1/26/2019 Initial Diagnosis    Peritoneal carcinomatosis     2/15/2019 - 7/8/2019 Chemotherapy    Treatment Summary   Plan Name: OP GYN PACLITAXEL CARBOPLATIN (AUC 6) Q3W  Treatment Goal: Palliative  Status: Inactive  Start Date: 2/28/2019  End Date: 6/18/2019  Provider: Will Trevizo Jr., MD  Chemotherapy: bevacizumab  (AVASTIN) 15 mg/kg = 1,600 mg in sodium chloride 0.9% 100 mL chemo infusion, 15 mg/kg = 1,600 mg (100 % of original dose 15 mg/kg), Intravenous, Clinic/[facility] 1 time, 6 of 6 cycles  Dose modification: 15 mg/kg (original dose 15 mg/kg, Cycle 1)  Administration: 1,600 mg (2/28/2019), 1,600 mg (3/21/2019), 1,600 mg (4/11/2019), 1,600 mg (5/7/2019), 1,600 mg (5/28/2019), 1,510 mg (6/18/2019)  CARBOplatin (PARAPLATIN) 870 mg in sodium chloride 0.9% 337 mL chemo infusion, 870 mg (100 % of original dose 868.8 mg), Intravenous, Clinic/[facility] 1 time, 6 of 6 cycles  Dose modification:   (original dose 868.8 mg, Cycle 1)  Administration: 870 mg (2/28/2019), 870 mg (3/21/2019), 900 mg (4/11/2019), 870 mg (5/7/2019), 660 mg (5/28/2019), 690 mg (6/18/2019)  PACLitaxel (TAXOL) 175 mg/m2 = 396 mg in sodium chloride 0.9% 566 mL chemo infusion, 175 mg/m2 = 396 mg, Intravenous, Clinic/[facility] 1 time, 6 of 6 cycles  Dose modification: 140 mg/m2 (80 % of original dose 175 mg/m2, Cycle 5)  Administration: 396 mg (2/28/2019), 396 mg (3/21/2019), 396 mg (4/11/2019), 396 mg (5/7/2019), 318 mg (5/28/2019), 306 mg (6/18/2019)     10/9/2019 - 9/24/2020 Chemotherapy    Treatment Summary   Plan Name: OP BEVACIZUMAB Q3W   Treatment Goal: Maintenance  Status: Inactive  Start Date: 10/9/2019  End Date: 9/24/2020  Provider: Oscar Mckeon MD  Chemotherapy: dexAMETHasone tablet 8 mg, 8 mg (100 % of original dose 8 mg), Oral, Clinic/[facility] 1 time, 2 of 8 cycles  Dose modification: 8 mg (original dose 8 mg, Cycle 8), 8 mg (original dose 8 mg, Cycle 12)  Administration: 8 mg (7/22/2020), 8 mg (8/13/2020)  bevacizumab (AVASTIN) 15 mg/kg = 1,615 mg in sodium chloride 0.9% 100 mL chemo infusion, 15 mg/kg = 1,615 mg, Intravenous, [facility] 1 time, 11 of 17 cycles  Administration: 1,615 mg (10/9/2019), 1,615 mg (10/29/2019), 1,615 mg (12/10/2019), 1,615 mg (1/21/2020), 1,600 mg (4/2/2020), 1,615 mg (4/23/2020), 1,600 mg (7/1/2020), 1,600 mg (7/22/2020), 1,600 mg  (8/13/2020), 1,795 mg (9/3/2020), 1,795 mg (9/24/2020)     9/29/2023 -  Chemotherapy    Treatment Summary   Plan Name: OP GYN PACLITAXEL CARBOPLATIN desensitization (AUC 5) Q3W  Treatment Goal: Control  Status: Active  Start Date: 9/29/2023  End Date: 10/2/2024 (Planned)  Provider: Ismael Juarez MD  Chemotherapy: CARBOplatin (PARAPLATIN) 650 mg in sodium chloride 0.9% 335 mL chemo infusion, 650 mg (100 % of original dose 648 mg), Intravenous, Clinic/[facility] 1 time, 6 of 17 cycles  Dose modification:   (original dose 648 mg, Cycle 1),   (original dose 853.8 mg, Cycle 2),   (original dose 730 mg, Cycle 3),   (original dose 730 mg, Cycle 4), 5 mg (original dose 730 mg, Cycle 4), 7.3 mg (original dose 730 mg, Cycle 4), 5 mg (original dose 730 mg, Cycle 4, Reason: MD Discretion, Comment: desensitization), 73 mg (original dose 730 mg, Cycle 4), 720 mg (original dose 730 mg, Cycle 4, Reason: MD Discretion, Comment: desensitization), 648.97 mg (original dose 730 mg, Cycle 4, Reason: MD Discretion, Comment: desensitization), 0.61 mg (original dose 730 mg, Cycle 5, Reason: MD Discretion, Comment: desensitization), 5 mg (original dose 730 mg, Cycle 5, Reason: MD Discretion, Comment: desens), 60.5 mg (original dose 730 mg, Cycle 5, Reason: MD Discretion, Comment: desensitization),   (original dose 730 mg, Cycle 5, Reason: MD Discretion, Comment: new scr), 6.05 mg (original dose 730 mg, Cycle 5, Reason: Dose not tolerated, Comment: desensitization)  Administration: 650 mg (9/29/2023), 730 mg (11/10/2023), 710 mg (10/20/2023), 5 mg (1/3/2024), 5 mg (1/3/2024), 75 mg (1/3/2024), 650 mg (1/3/2024), 5 mg (2/14/2024), 5 mg (2/14/2024), 75 mg (2/14/2024), 650 mg (2/14/2024), 5 mg (1/24/2024), 60 mg (1/24/2024), 605 mg (1/24/2024), 5 mg (1/24/2024)  PACLitaxeL (TAXOL) 175 mg/m2 = 402 mg in sodium chloride 0.9% 500 mL chemo infusion, 175 mg/m2 = 402 mg, Intravenous, Clinic/[facility] 1 time, 6 of 17 cycles  Administration: 402 mg (9/29/2023), 402  mg (10/20/2023), 408 mg (11/10/2023), 408 mg (1/3/2024), 408 mg (1/24/2024), 408 mg (2/14/2024)     Malignant neoplasm of right ovary (Resolved)   2/15/2019 Initial Diagnosis    Malignant neoplasm of right ovary     2/15/2019 - 7/8/2019 Chemotherapy    Treatment Summary   Plan Name: OP GYN PACLITAXEL CARBOPLATIN (AUC 6) Q3W  Treatment Goal: Palliative  Status: Inactive  Start Date: 2/28/2019  End Date: 6/18/2019  Provider: Will Trevizo Jr., MD  Chemotherapy: bevacizumab (AVASTIN) 15 mg/kg = 1,600 mg in sodium chloride 0.9% 100 mL chemo infusion, 15 mg/kg = 1,600 mg (100 % of original dose 15 mg/kg), Intravenous, [facility] 1 time, 6 of 6 cycles  Dose modification: 15 mg/kg (original dose 15 mg/kg, Cycle 1)  Administration: 1,600 mg (2/28/2019), 1,600 mg (3/21/2019), 1,600 mg (4/11/2019), 1,600 mg (5/7/2019), 1,600 mg (5/28/2019), 1,510 mg (6/18/2019)  CARBOplatin (PARAPLATIN) 870 mg in sodium chloride 0.9% 337 mL chemo infusion, 870 mg (100 % of original dose 868.8 mg), Intravenous, Clinic/[facility] 1 time, 6 of 6 cycles  Dose modification:   (original dose 868.8 mg, Cycle 1)  Administration: 870 mg (2/28/2019), 870 mg (3/21/2019), 900 mg (4/11/2019), 870 mg (5/7/2019), 660 mg (5/28/2019), 690 mg (6/18/2019)  PACLitaxel (TAXOL) 175 mg/m2 = 396 mg in sodium chloride 0.9% 566 mL chemo infusion, 175 mg/m2 = 396 mg, Intravenous, Clinic/[facility] 1 time, 6 of 6 cycles  Dose modification: 140 mg/m2 (80 % of original dose 175 mg/m2, Cycle 5)  Administration: 396 mg (2/28/2019), 396 mg (3/21/2019), 396 mg (4/11/2019), 396 mg (5/7/2019), 318 mg (5/28/2019), 306 mg (6/18/2019)     10/9/2019 - 9/24/2020 Chemotherapy    Treatment Summary   Plan Name: OP BEVACIZUMAB Q3W   Treatment Goal: Maintenance  Status: Inactive  Start Date: 10/9/2019  End Date: 9/24/2020  Provider: Oscar Mckeon MD  Chemotherapy: dexAMETHasone tablet 8 mg, 8 mg (100 % of original dose 8 mg), Oral, [facility] 1 time, 2 of 8 cycles  Dose modification: 8 mg (original  dose 8 mg, Cycle 8), 8 mg (original dose 8 mg, Cycle 12)  Administration: 8 mg (7/22/2020), 8 mg (8/13/2020)  bevacizumab (AVASTIN) 15 mg/kg = 1,615 mg in sodium chloride 0.9% 100 mL chemo infusion, 15 mg/kg = 1,615 mg, Intravenous, Clinic/[facility] 1 time, 11 of 17 cycles  Administration: 1,615 mg (10/9/2019), 1,615 mg (10/29/2019), 1,615 mg (12/10/2019), 1,615 mg (1/21/2020), 1,600 mg (4/2/2020), 1,615 mg (4/23/2020), 1,600 mg (7/1/2020), 1,600 mg (7/22/2020), 1,600 mg (8/13/2020), 1,795 mg (9/3/2020), 1,795 mg (9/24/2020)     Malignant neoplasm of both ovaries (Resolved)   2/18/2019 Initial Diagnosis    Ovarian cancer     10/9/2019 - 9/24/2020 Chemotherapy    Treatment Summary   Plan Name: OP BEVACIZUMAB Q3W   Treatment Goal: Maintenance  Status: Inactive  Start Date: 10/9/2019  End Date: 9/24/2020  Provider: Oscar Mckeon MD  Chemotherapy: dexAMETHasone tablet 8 mg, 8 mg (100 % of original dose 8 mg), Oral, Clinic/[facility] 1 time, 2 of 8 cycles  Dose modification: 8 mg (original dose 8 mg, Cycle 8), 8 mg (original dose 8 mg, Cycle 12)  Administration: 8 mg (7/22/2020), 8 mg (8/13/2020)  bevacizumab (AVASTIN) 15 mg/kg = 1,615 mg in sodium chloride 0.9% 100 mL chemo infusion, 15 mg/kg = 1,615 mg, Intravenous, Clinic/[facility] 1 time, 11 of 17 cycles  Administration: 1,615 mg (10/9/2019), 1,615 mg (10/29/2019), 1,615 mg (12/10/2019), 1,615 mg (1/21/2020), 1,600 mg (4/2/2020), 1,615 mg (4/23/2020), 1,600 mg (7/1/2020), 1,600 mg (7/22/2020), 1,600 mg (8/13/2020), 1,795 mg (9/3/2020), 1,795 mg (9/24/2020)     Ovarian cancer, bilateral   8/15/2019 Initial Diagnosis    Ovarian cancer, bilateral     10/9/2019 - 9/24/2020 Chemotherapy    Treatment Summary   Plan Name: OP BEVACIZUMAB Q3W   Treatment Goal: Maintenance  Status: Inactive  Start Date: 10/9/2019  End Date: 9/24/2020  Provider: Oscar Mckeon MD  Chemotherapy: dexAMETHasone tablet 8 mg, 8 mg (100 % of original dose 8 mg), Oral, Clinic/[facility] 1 time, 2 of 8 cycles  Dose modification:  8 mg (original dose 8 mg, Cycle 8), 8 mg (original dose 8 mg, Cycle 12)  Administration: 8 mg (7/22/2020), 8 mg (8/13/2020)  bevacizumab (AVASTIN) 15 mg/kg = 1,615 mg in sodium chloride 0.9% 100 mL chemo infusion, 15 mg/kg = 1,615 mg, Intravenous, Clinic/[facility] 1 time, 11 of 17 cycles  Administration: 1,615 mg (10/9/2019), 1,615 mg (10/29/2019), 1,615 mg (12/10/2019), 1,615 mg (1/21/2020), 1,600 mg (4/2/2020), 1,615 mg (4/23/2020), 1,600 mg (7/1/2020), 1,600 mg (7/22/2020), 1,600 mg (8/13/2020), 1,795 mg (9/3/2020), 1,795 mg (9/24/2020)     9/29/2023 -  Chemotherapy    Treatment Summary   Plan Name: OP GYN PACLITAXEL CARBOPLATIN desensitization (AUC 5) Q3W  Treatment Goal: Control  Status: Active  Start Date: 9/29/2023  End Date: 10/2/2024 (Planned)  Provider: Ismael Juarez MD  Chemotherapy: CARBOplatin (PARAPLATIN) 650 mg in sodium chloride 0.9% 335 mL chemo infusion, 650 mg (100 % of original dose 648 mg), Intravenous, Clinic/[facility] 1 time, 6 of 17 cycles  Dose modification:   (original dose 648 mg, Cycle 1),   (original dose 853.8 mg, Cycle 2),   (original dose 730 mg, Cycle 3),   (original dose 730 mg, Cycle 4), 5 mg (original dose 730 mg, Cycle 4), 7.3 mg (original dose 730 mg, Cycle 4), 5 mg (original dose 730 mg, Cycle 4, Reason: MD Discretion, Comment: desensitization), 73 mg (original dose 730 mg, Cycle 4), 720 mg (original dose 730 mg, Cycle 4, Reason: MD Discretion, Comment: desensitization), 648.97 mg (original dose 730 mg, Cycle 4, Reason: MD Discretion, Comment: desensitization), 0.61 mg (original dose 730 mg, Cycle 5, Reason: MD Discretion, Comment: desensitization), 5 mg (original dose 730 mg, Cycle 5, Reason: MD Discretion, Comment: desens), 60.5 mg (original dose 730 mg, Cycle 5, Reason: MD Discretion, Comment: desensitization),   (original dose 730 mg, Cycle 5, Reason: MD Discretion, Comment: new scr), 6.05 mg (original dose 730 mg, Cycle 5, Reason: Dose not tolerated, Comment:  desensitization)  Administration: 650 mg (2023), 730 mg (11/10/2023), 710 mg (10/20/2023), 5 mg (1/3/2024), 5 mg (1/3/2024), 75 mg (1/3/2024), 650 mg (1/3/2024), 5 mg (2024), 5 mg (2024), 75 mg (2024), 650 mg (2024), 5 mg (2024), 60 mg (2024), 605 mg (2024), 5 mg (2024)  PACLitaxeL (TAXOL) 175 mg/m2 = 402 mg in sodium chloride 0.9% 500 mL chemo infusion, 175 mg/m2 = 402 mg, Intravenous, Clinic/[facility] 1 time, 6 of 17 cycles  Administration: 402 mg (2023), 402 mg (10/20/2023), 408 mg (11/10/2023), 408 mg (1/3/2024), 408 mg (2024), 408 mg (2024)           Past Medical History:   Diagnosis Date    Anemia     Anxiety     Arthritis     knees    Cancer     ovarian    CHF (congestive heart failure)     Hx antineoplastic chemotherapy     last 2019    Hyperlipemia     Hypertension     Neck pain     Ovarian cancer 2019    CHEMO    Peritoneal carcinomatosis 2019       Past Surgical History:   Procedure Laterality Date    breast reduction  10/02/2018    CATARACT EXTRACTION Bilateral     2023     SECTION      X 1    COLONOSCOPY N/A 2021    Procedure: COLONOSCOPY;  Surgeon: Paulette Rojas MD;  Location: [facility];  Service: Gastroenterology;  Laterality: N/A;    CYSTOSCOPY W/ URETERAL STENT PLACEMENT Right 2019    Procedure: CYSTOSCOPY, WITH URETERAL STENT INSERTION;  Surgeon: Timmy Santiago IV, MD;  Location: [facility] OR;  Service: Urology;  Laterality: Right;    CYSTOSCOPY W/ URETERAL STENT REMOVAL  10/04/2019    DILATION AND CURETTAGE OF UTERUS      HYSTERECTOMY      RALH for fibroids (still has ovaries)    INSERTION OF VENOUS ACCESS PORT Left 2019    Procedure: INSERTION, VENOUS ACCESS PORT;  Surgeon: Ulisses Monzon MD;  Location: [facility] OR;  Service: General;  Laterality: Left;  Left internal jugular     LYSIS OF ADHESIONS OF URETER N/A 08/15/2019    Procedure: URETEROLYSIS;  Surgeon: Ismael Juarez MD;  Location: [facility] OR;   Service: OB/GYN;  Laterality: N/A;    OMENTECTOMY N/A 08/15/2019    Procedure: OMENTECTOMY;  Surgeon: Ismael Juarez MD;  Location: [facility] OR;  Service: OB/GYN;  Laterality: N/A;    OOPHORECTOMY      RETROGRADE PYELOGRAPHY Right 2019    Procedure: PYELOGRAM, RETROGRADE;  Surgeon: Timmy Santiago IV, MD;  Location: [facility] OR;  Service: Urology;  Laterality: Right;    ROBOT-ASSISTED LAPAROSCOPIC SALPINGO-OOPHORECTOMY USING DA TIA XI Bilateral 08/15/2019    Procedure: XI ROBOTIC SALPINGO-OOPHORECTOMY;  Surgeon: Ismael Juarez MD;  Location: [facility] OR;  Service: OB/GYN;  Laterality: Bilateral;    TOTAL REDUCTION MAMMOPLASTY  2018    TUBAL LIGATION         Family History   Problem Relation Name Age of Onset    Glaucoma Mother      No Known Problems Father      Colon cancer Brother      Breast cancer Maternal Aunt      Breast cancer Paternal Aunt      Ovarian cancer Paternal Aunt      Anesthesia problems Other cousin     Thrombophilia Neg Hx         Review of patient's allergies indicates:  No Known Allergies    Social History     Tobacco Use    Smoking status: Former     Current packs/day: 0.00     Average packs/day: 1 pack/day for 25.0 years (25.0 ttl pk-yrs)     Types: Cigarettes     Start date: 10/1/1993     Quit date: 10/1/2018     Years since quittin.8    Smokeless tobacco: Never    Tobacco comments:     States started quit 2 months ago after 30 years   Substance Use Topics    Alcohol use: Yes     Comment: occasionally  No alcohol 72h prior to sx    Drug use: No     Physical Exam     ECOG SCORE    0 - Fully active-able to carry on all pre-disease performance without restriction       There were no vitals filed for this visit.      Physical Exam  Constitutional:       Appearance: Normal appearance. She is normal weight.   HENT:      Head: Normocephalic and atraumatic.   Eyes:      Conjunctiva/sclera: Conjunctivae normal.   Cardiovascular:      Rate and Rhythm: Normal rate.   Pulmonary:      Effort: Pulmonary  effort is normal.   Lymphadenopathy:      Head:      Right side of head: No submental, submandibular, tonsillar, preauricular or occipital adenopathy.      Left side of head: No submental, submandibular, tonsillar, preauricular or occipital adenopathy.      Cervical: No cervical adenopathy.      Right cervical: No superficial cervical adenopathy.     Left cervical: No superficial cervical adenopathy.      Upper Body:      Right upper body: Supraclavicular adenopathy present.      Left upper body: No supraclavicular adenopathy.   Skin:     General: Skin is warm.   Neurological:      General: No focal deficit present.      Mental Status: She is alert and oriented to person, place, and time.   Psychiatric:         Mood and Affect: Mood normal.         Behavior: Behavior normal.       Labs   Labs:  No visits with results within 2 Day(s) from this visit.   Latest known visit with results is:   Hospital Outpatient Visit on 07/18/2024   Component Date Value Ref Range Status    Prothrombin Time 07/18/2024 10.9  9.0 - 12.5 sec Final    INR 07/18/2024 0.9  0.8 - 1.2 Final    Comment: Coumadin Therapy:  2.0 - 3.0 for INR for all indicators except mechanical heart valves  and antiphospholipid syndromes which should use 2.5 - 3.5.      Final Pathologic Diagnosis 07/18/2024  (A)   Final                    Value:Lymph node, left subpectoral (needle biopsy):  - Positive for malignancy  - Metastatic high grade carcinoma, consistent with known clinical history of high grade ovarian carcinoma       Interp By Renuka Castellano MD, Signed on 07/22/2024 at 11:45    Gross 07/18/2024  (A)   Final                    Value:Surgery ID:  [id number]   Pathology ID:  [id number]  1. Received in formalin labeled &quot;left subpectoral lymph node biopsy&quot; is a 0.3 x 0.3 x 0.2 cm aggregate of tan-white tissue fragments.  The specimen is submitted entirely in cassette 1A.    IPU--1-A        Grossed by: Perez Gómez, MS, PA(ASCP)       Microscopic Exam 07/18/2024  (A)   Final                    Value:WT1: Positive  PAX8: Negative/weak (high background cytoplasmic positivity)   All stains with appropriate positive and negative controls.      Comment 07/18/2024 Preliminary findings communicated to Dr. Juarez. Residual tissue block material is preserved for potential external requests for tumor testing. (A)   Final    Disclaimer 07/18/2024 Unless the case is a 'gross only' or additional testing only, the final diagnosis for each specimen is based on a microscopic examination of appropriate tissue sections. (A)   Final    Lymph Node Interpretation 07/18/2024 Extremely low cell events and small lymph gate are noted.  See comment.   Final    Interp By Carina Goldberg MD, Signed on 07/19/2024 at 11:24    Lymph Node Antibodies Analyzed 07/18/2024 All analyzed: CD2, CD3, CD4, CD5, CD7, CD8, CD10, CD13, CD19, CD20, CD34, , KAPPA, LAMBDA,CD45 and 7AAD.   Final    Lymph Node Comment 07/18/2024    Final                    Value:Flow cytometric analysis of  lymph node shows extremely low cell events and small lymph gate.  Inadequate for further evaluation.  Correlate with tissue morphology.  Flow differential:  Lymphocytes 11.6%, Monocytes 0.0%, Granulocytes  63.0%, Blast  0.0%, Debris/nRBC 2.9%,  Viability 91.3%.      Comment: This test was developed and its performance characteristics   determined by [facility].    It has not been cleared or approved by the U.S. Food and Drug   Administration. The FDA has determined that such clearance or   approval is not necessary.  This test is used for clinical purposes.    It should not be regarded as investigational or for research.  This   laboratory is certified under CLIA-88 as qualified to perform high   complexity clinical laboratory testing.          Imaging   CT Chest Abdomen Pelvis With IV Contrast (XPD) NO Oral Contrast 12/12/2023  Details    Reading Physician Reading Date  Result Priority   May, Michele FLORES MD  [phone] 12/12/2023 STAT     Narrative & Impression  EXAMINATION:  CT CHEST ABDOMEN PELVIS WITH IV CONTRAST (XPD)     CLINICAL HISTORY:  Hypokalemia,follow up ovarian cancer on treatment; evaluate treatment response for subsequent treatment planning.     TECHNIQUE:  Axial CT imaging was performed through the chest, abdomen and pelvis with  100cc  of intravenous contrast. Multiplanar reformats were performed and interpreted.     COMPARISON:  08/21/2023, 09/29/2022 and 09/24/2020     FINDINGS:  Chest:     The heart, great vessels, and mediastinal structures are within normal limits. No thoracic adenopathy.  Left-sided MediPort in the SVC.  Aortic atherosclerosis.     The lungs are clear bilaterally. No pleural effusions.     Abdomen:     The liver, spleen, kidneys, adrenal glands are within normal limits.  Stable 10 mm uncinate process pancreatic cyst.  No biliary ductal dilatation.     The gallbladder is unremarkable.     No free fluid, free air, or inflammatory change.     The small bowel is within normal limits.  Colonic diverticulosis.  Supraumbilical abdominal wall hernia containing a nondistended segment of the transverse colon.  No evidence of strangulation or incarceration.  The appendix is normal.     Aortic atherosclerosis without evidence of aneurysm.     Pelvis:     The urinary bladder is unremarkable. The uterus has been removed.  No free pelvic fluid or adenopathy.     No significant osseous abnormality is identified.  No suspicious osseous lesions.     Impression:     No CT evidence of recurrent or metastatic disease in the chest, abdomen or pelvis.     All CT scans at this facility are performed  using dose modulation techniques as appropriate to performed exam including the following:  automated exposure control; adjustment of mA and/or kV according to the patients size (this includes techniques or standardized protocols for targeted exams where dose is matched  to indication/reason for exam: i.e. extremities or head);  iterative reconstruction technique.        NM PET CT FDG SKULL BASE TO MID THIGH - 02/26/2024  FINDINGS:  Head/neck: There is normal physiologic uptake noted within the visualized brain parenchyma. No FDG avid adenopathy within the neck.     Chest: No FDG avid pulmonary nodules/masses. No FDG avid mediastinal, hilar or axillary lymph nodes.A left-sided MediPort catheter is noted.     Abdomen/Pelvis: Normal physiologic uptake noted within the liver, spleen, urinary tract, and bowel.The adrenals are unremarkable.  There are a couple of left periaortic retroperitoneal lymph nodes that demonstrate an SUV max of up to 2.8 and 3.1.  Findings are nonspecific.  There is diverticulosis seen scattered throughout the colon.  The midportion of the transverse colon is contained within a midline ventral hernia.  No evidence for bowel obstruction..     Skeletal: No FDG avid osseus lesions identified.     Impression:  1. Low level nonspecific uptake associated with a couple of left periaortic retroperitoneal lymph nodes.  It is possible these lymph nodes may just be reactive in nature.  Continued follow-up recommended.      NM PET CT FDG SKULL BASE TO MID THIGH - 04/08/2024  FINDINGS:  Quality of the study: Adequate.     SUV max of the liver parenchyma is 3.7     Neck: No abnormal FDG avidity.  No lymphadenopathy or mass.     Chest: No FDG avid mediastinal, hilar, or axillary lymphadenopathy.  No pleural effusion.  No pulmonary nodule.     Abdomen/pelvis: Interval resolution of the low level FDG avidity of the left para-aortic lymph nodes.  Lymph nodes are also smaller compared to the prior exam and normal in size on this exam.  No abnormal FDG avidity.  No ascites.  No lymphadenopathy     Skeletal structures: No abnormal FDG avidity.  No focal lytic or sclerotic lesion in the osseous structures.     Physiologic uptake of the tracer is present within the brain, salivary  glands, myocardium, GI and  tracts.     Incidental CT findings: No interval change in the umbilical hernia containing a loop of colon.  Left IJ MediPort with its tip in the SVC.    Impression:  No FDG PET/CT findings to suggest recurrent or metastatic disease.  Interval resolution of the low level FDG avidity seen in the left para-aortic retroperitoneal lymph nodes on the prior exam.      NM PET CT FDG SKULL BASE TO MID THIGH - 07/02/2024  CLINICAL HISTORY:  surveillance; Drug-induced polyneuropathy     TECHNIQUE:  13.71 mCi of F18-FDG was administered intravenously.  After an approximately 60 min distribution time, PET/CT images were acquired from the skull base to the mid thigh. Transmission images were acquired to correct for attenuation using a whole body low-dose CT scan without contrast.     COMPARISON:  Prior PET CTs, most recent 04/08/2024; CT abdomen/pelvis 05/29/2024     FINDINGS:  Head/neck: New right supraclavicular/neck base FDG avid lymph nodes, SUV max 11.  Otherwise normal physiologic activity.     Chest: New enlarged left subpectoral FDG avid lymph node, SUV max 11.  No FDG avid right axillary adenopathy.  New mediastinal FDG avid adenopathy (paratracheal, prevascular subcarinal).  SUV max 15 (pre-vascular).  New left internal mammary FDG avid lymph node, SUV max 6.5.  New anterior pericardial FDG avid nodule, axial fused image 175.  New left paraspinal FDG avid nodule, axial fused image 176.     Trace bilateral pleural effusions.  No FDG avid pulmonary nodule appreciated.     Abdomen/pelvis: Small FDG avid left upper quadrant omental nodularity/thickening, SUV max 3.5 (axial fused image 182 through 210).  Left pericolic gutter FDG avid soft tissue nodule, SUV max 9 (axial fused image 273).  Suspected small FDG avid nodules/lymph nodes closely associated with small bowel loops within the central upper pelvis, axial fused image 301 and 316.  FDG avid right iliac and  lymph nodes (axial  fused image 324 and 331), SUV max 6.4.     Trace nonspecific pelvic free fluid.  Colonic diverticulosis.  Bowel containing anterior ventral hernia as noted on prior CT.     Skeletal/subcutaneous structures: No suspicious FDG avid osseous abnormality.     Impression:  Disease progression with neck base/chest, abdomen and pelvis FDG avid disease now present as above.     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  Patient was then treated with Avastin maintenance  08/21/2023 CT CAP: Marked interval disease progression with severe peritoneal carcinomatosis as detailed above.  12/12/2023 CT CAP: No CT evidence of recurrent or metastatic disease in the chest, abdomen or pelvis.  : 7 >> 21 >> 32 >> 64 >> 11  Started Carbo/Taxol 09/29/2023  C1 no complications  C2 and C3 (11/10/23): reaction consisting of feeling presyncopal, SOB, sweating; vitals significant for hypoxia and hypotension; evaluated by AI and recs for desensitization  C4 -C6with desensitization  Per medical record, patient declined treatment until after the new year  CT CAP 12/12/23 showed CLYDE  PET-CT 02/26/24: Low level nonspecific uptake associated with a couple of left periaortic retroperitoneal lymph nodes.  It is possible these lymph nodes may just be reactive in nature  PET-CT 04/08/2024:  No FDG PET/CT findings to suggest recurrent or metastatic disease. Interval resolution of the low level FDG avidity seen in the left para-aortic retroperitoneal lymph nodes on the prior exam.   Plan  PET-CT 07/02/24: Disease progression with neck base/chest, abdomen and pelvis FDG avid disease now present as above.   Presented in Gyn/Onc tumor  board 07/31/24 with consensus for FRa testing and proceed with mirvetuximab if positive and if negative proceed with Doxil  Tumor NGS/Blood NGS/FRa testing pending      Chronic Medical Conditions  Osteoarthritis  HTN  Anxiety        Med Onc Chart Routing      Follow up with physician 2 weeks.   Follow up with [facility]    Infusion scheduling note   Chemo (Doxil) vs Immunotherapy (mirvetuximab) (will know in 1 week which patient will received based on genetic test results)   Injection scheduling note    Labs CBC, CMP, phosphorus and magnesium   Scheduling:  Preferred lab:  Lab interval:     Imaging    Pharmacy appointment    Other referrals                The patient was seen, interviewed and examined. Pertinent lab and radiologic studies were reviewed. Pt instructed to call should they develop concerning signs/symptoms or have further questions.        Portions of the record may have been created with voice recognition software. Occasional wrong-word or sound-a-likez substitutions may have occurred due to the inherent limitations of voice recognition software. Read the chart carefully and recognize, using context, where substitutions have occurred.      Torri Pugh MD    Hematology/Oncology

## 2024-08-12 ENCOUNTER — PATIENT MESSAGE (OUTPATIENT)
Dept: HEMATOLOGY/ONCOLOGY | Facility: CLINIC | Age: 69
End: 2024-08-12
Payer: MEDICARE

## 2024-08-13 ENCOUNTER — TELEPHONE (OUTPATIENT)
Dept: HEMATOLOGY/ONCOLOGY | Facility: CLINIC | Age: 69
End: 2024-08-13
Payer: MEDICARE

## 2024-08-13 NOTE — TELEPHONE ENCOUNTER
"Call placed to pt per Dr. House's request to update pt that we have now expedited the test results as they should have been resulted by now. My apologies were given with verbalized understanding from pt. Pt also inquired about how aggressive the cancer is? I asked Dr. House who states "not able to say exactly, but it is at least moderately aggressive". Updated pt of this response and that we would reach out to pt with next steps once lab results are received, pt verbalized understanding.   "

## 2024-08-14 ENCOUNTER — PATIENT MESSAGE (OUTPATIENT)
Dept: HEMATOLOGY/ONCOLOGY | Facility: CLINIC | Age: 69
End: 2024-08-14
Payer: MEDICARE

## 2024-08-14 DIAGNOSIS — C56.9 MALIGNANT NEOPLASM OF OVARY, UNSPECIFIED LATERALITY: ICD-10-CM

## 2024-08-14 DIAGNOSIS — Z90.722 S/P BSO (BILATERAL SALPINGO-OOPHORECTOMY): ICD-10-CM

## 2024-08-14 DIAGNOSIS — I10 HYPERTENSION, UNSPECIFIED TYPE: ICD-10-CM

## 2024-08-14 RX ORDER — ROSUVASTATIN CALCIUM 10 MG/1
10 TABLET, COATED ORAL DAILY
Qty: 90 TABLET | Refills: 3 | Status: SHIPPED | OUTPATIENT
Start: 2024-08-14

## 2024-08-15 NOTE — TELEPHONE ENCOUNTER
Refill Decision Note   Casie Eder  is requesting a refill authorization.  Brief Assessment and Rationale for Refill:  Approve     Medication Therapy Plan:        Comments:     Note composed:10:14 PM 08/14/2024

## 2024-08-15 NOTE — TELEPHONE ENCOUNTER
No care due was identified.  [facility] Embedded Care Due Messages. Reference number: [id number].   8/14/2024 9:24:12 PM CDT

## 2024-08-16 ENCOUNTER — TELEPHONE (OUTPATIENT)
Dept: PULMONOLOGY | Facility: CLINIC | Age: 69
End: 2024-08-16
Payer: MEDICARE

## 2024-08-19 ENCOUNTER — OFFICE VISIT (OUTPATIENT)
Dept: PULMONOLOGY | Facility: CLINIC | Age: 69
End: 2024-08-19
Payer: MEDICARE

## 2024-08-19 ENCOUNTER — CLINICAL SUPPORT (OUTPATIENT)
Dept: PULMONOLOGY | Facility: CLINIC | Age: 69
End: 2024-08-19
Payer: MEDICARE

## 2024-08-19 ENCOUNTER — TELEPHONE (OUTPATIENT)
Dept: RADIOLOGY | Facility: HOSPITAL | Age: 69
End: 2024-08-19
Payer: MEDICARE

## 2024-08-19 ENCOUNTER — PATIENT MESSAGE (OUTPATIENT)
Dept: HEMATOLOGY/ONCOLOGY | Facility: CLINIC | Age: 69
End: 2024-08-19
Payer: MEDICARE

## 2024-08-19 VITALS
OXYGEN SATURATION: 96 % | HEART RATE: 80 BPM | SYSTOLIC BLOOD PRESSURE: 100 MMHG | BODY MASS INDEX: 38.51 KG/M2 | HEIGHT: 67 IN | DIASTOLIC BLOOD PRESSURE: 60 MMHG | RESPIRATION RATE: 21 BRPM | WEIGHT: 245.38 LBS

## 2024-08-19 VITALS — HEIGHT: 67 IN | WEIGHT: 245.38 LBS | BODY MASS INDEX: 38.51 KG/M2

## 2024-08-19 DIAGNOSIS — G47.33 OSA (OBSTRUCTIVE SLEEP APNEA): ICD-10-CM

## 2024-08-19 DIAGNOSIS — R59.1 LYMPHADENOPATHY: Primary | ICD-10-CM

## 2024-08-19 DIAGNOSIS — I27.20 PULMONARY HTN: ICD-10-CM

## 2024-08-19 DIAGNOSIS — R06.09 DYSPNEA ON EXERTION: ICD-10-CM

## 2024-08-19 DIAGNOSIS — C56.3 OVARIAN CANCER, BILATERAL: ICD-10-CM

## 2024-08-19 DIAGNOSIS — C78.6 PERITONEAL CARCINOMATOSIS: ICD-10-CM

## 2024-08-19 DIAGNOSIS — G47.30 SLEEP-DISORDERED BREATHING: Primary | ICD-10-CM

## 2024-08-19 PROBLEM — J18.9 PNEUMONIA: Status: RESOLVED | Noted: 2024-05-18 | Resolved: 2024-08-19

## 2024-08-19 PROCEDURE — 3074F SYST BP LT 130 MM HG: CPT | Mod: HCNC,CPTII,S$GLB, | Performed by: HOSPITALIST

## 2024-08-19 PROCEDURE — 99214 OFFICE O/P EST MOD 30 MIN: CPT | Mod: HCNC,S$GLB,, | Performed by: HOSPITALIST

## 2024-08-19 PROCEDURE — 99999 PR PBB SHADOW E&M-EST. PATIENT-LVL IV: CPT | Mod: PBBFAC,HCNC,, | Performed by: HOSPITALIST

## 2024-08-19 PROCEDURE — 99999 PR PBB SHADOW E&M-EST. PATIENT-LVL I: CPT | Mod: PBBFAC,HCNC,,

## 2024-08-19 PROCEDURE — 1160F RVW MEDS BY RX/DR IN RCRD: CPT | Mod: HCNC,CPTII,S$GLB, | Performed by: HOSPITALIST

## 2024-08-19 PROCEDURE — 3288F FALL RISK ASSESSMENT DOCD: CPT | Mod: HCNC,CPTII,S$GLB, | Performed by: HOSPITALIST

## 2024-08-19 PROCEDURE — 3008F BODY MASS INDEX DOCD: CPT | Mod: HCNC,CPTII,S$GLB, | Performed by: HOSPITALIST

## 2024-08-19 PROCEDURE — 94618 PULMONARY STRESS TESTING: CPT | Mod: HCNC,S$GLB,, | Performed by: INTERNAL MEDICINE

## 2024-08-19 PROCEDURE — 1101F PT FALLS ASSESS-DOCD LE1/YR: CPT | Mod: HCNC,CPTII,S$GLB, | Performed by: HOSPITALIST

## 2024-08-19 PROCEDURE — 3078F DIAST BP <80 MM HG: CPT | Mod: HCNC,CPTII,S$GLB, | Performed by: HOSPITALIST

## 2024-08-19 PROCEDURE — 1159F MED LIST DOCD IN RCRD: CPT | Mod: HCNC,CPTII,S$GLB, | Performed by: HOSPITALIST

## 2024-08-19 NOTE — TELEPHONE ENCOUNTER
Interventional Radiology  Scheduled 10:30AM repeat lymph node biopsy for 8/22/24 w/patient.  Instructed pt. to arrive by 9:00AM to the hospital ([address]/ [address]) off [address] in [place] and check in at Patient Registration located on the first floor.  She must have a ride and NPO after midnight the night before.  Pt. denies taking ASA or other blood thinners, NSAIDS, fish oil or other GLP-1/GIP agonists.  I gave patient our direct callback number [phone] and she verbalized understanding of all instructions.

## 2024-08-19 NOTE — ASSESSMENT & PLAN NOTE
- severe sleep apnea contributing  - nocturnal oxygen ordered, discussed that this is not a substitute for CPAP, but may provide some benefit

## 2024-08-19 NOTE — ASSESSMENT & PLAN NOTE
- severe sleep apnea and hypoxemia  - not having significant daytime symptoms, pt does not wish to proceed with cpap therapy

## 2024-08-19 NOTE — PROGRESS NOTES
Subjective:      Patient ID: Casie Gaines is a 68 y.o. female.    Chief Complaint: Sleep Apnea    Interval Hx 8/19/24:    Mrs. aGines presents today for fllow up shortness of breath. She was seen for the first and most recent time 4/2024- at that visit she rerpoted SOB after reciveing chemo, which had improved. HST was ordered and revealed severe sleep apnea. CPAP was ordered, but pt did not receive.     We reviewed her sleep study more in depth today- severe sleep apnea with significant hypoxemia. Mrs. Gaines does not wish to proceed with CPAP therapy- she is already stretched to the limit financially, does not have significant daytime symptoms, her cancer has progressed. She does have chronic dyspnea, had to take a break when getting inside the building from the parking lot.     HST 5/2024- AHI 91, SpO2 min 50%    HPI 4/15/2024:     68 year old female with history of ovarian cancer (s/p chemo/surgery), HTN, obesity who is referred to Pulmonary clinic by hCrissie Allen MD for evaluation of shortness of breath. Mrs. Gaines reports that her shortness of breath is much better now, she feels like it was likely related to the chemo and is improving now that she has completed. She used to have SOB with walking across a sanders, now it is more associated with fatigue after a full day of activity.      Pertinent Work Up:  CTA 3/2024- Negative for PE, prominence of pulmonary artery  D-dimer 2/2024- 1.47  ECHO 2/2024- Normal EF and LV diastolic function, normal RV size and systolic function, normal TR, no ePASP noted  ECHO 2019- ePASP 52mmHg     JERALD Evaluation:     Shift Work: Yes Remote, last 2003  Sleep Aids: No     Otis Sleepiness Scale TOTAL = 1    Review of Systems   Constitutional:  Positive for fatigue.   Respiratory:  Positive for snoring and dyspnea on extertion.      Objective:     Physical Exam   Constitutional: She is oriented to person, place, and time. She appears well-developed and well-nourished. She  "is obese.   Cardiovascular: Normal rate and regular rhythm.   Pulmonary/Chest: Normal expansion, effort normal and breath sounds normal.   Neurological: She is alert and oriented to person, place, and time.   Skin: Skin is warm and dry.     Personal Diagnostic Review  As Above      8/19/2024     9:45 AM 8/1/2024     1:44 PM 7/29/2024     8:38 AM 7/18/2024    11:25 AM 7/18/2024    11:10 AM 7/18/2024    10:55 AM 7/18/2024    10:35 AM   Pulmonary Function Tests   SpO2 96 % 97 % 95 % 97 % 92 % 93 % 92 %   Height 5' 7" (1.702 m) 5' 7" (1.702 m)        Weight 111.3 kg (245 lb 6 oz) 112.5 kg (248 lb 0.3 oz)        BMI (Calculated) 38.4 38.8             Assessment:     No diagnosis found.     Outpatient Encounter Medications as of 8/19/2024   Medication Sig Dispense Refill    albuterol (PROVENTIL/VENTOLIN HFA) 90 mcg/actuation inhaler Inhale 2 puffs into the lungs every 4 (four) hours as needed for Wheezing. Rescue 18 g 1    ALPRAZolam (XANAX) 0.25 MG tablet Take 1 tablet (0.25 mg total) by mouth 3 (three) times daily as needed for Anxiety. 60 tablet 2    amLODIPine (NORVASC) 5 MG tablet Take 2 tablets (10 mg total) by mouth once daily. 180 tablet 3    apixaban (ELIQUIS DVT-PE TREAT 30D START) 5 mg (74 tabs) DsPk For the first 7 days take two 5 mg tablets twice daily.  After 7 days take one 5 mg tablet twice daily. 74 tablet 0    atenoloL (TENORMIN) 25 MG tablet Take 1 tablet (25 mg total) by mouth once daily. 90 tablet 2    biotin 1 mg tablet Take 1,000 mcg by mouth once daily.       diclofenac sodium (VOLTAREN) 1 % Gel Apply once a day to back as needed 100 g 1    fluticasone propionate (FLONASE) 50 mcg/actuation nasal spray 1 spray (50 mcg total) by Each Nostril route every 12 (twelve) hours as needed for Rhinitis. 16 g 0    furosemide (LASIX) 40 MG tablet Take 1 tablet (40 mg total) by mouth once daily. 30 tablet 11    gabapentin (NEURONTIN) 300 MG capsule Take 2 capsules (600 mg total) by mouth 3 (three) times daily. " 180 capsule 3    ginkgo biloba 40 mg Tab Take 40 mg by mouth once daily.      multivitamin with minerals tablet Take 1 tablet by mouth once daily.       olmesartan-hydrochlorothiazide (BENICAR HCT) 40-25 mg per tablet Take 1 tablet by mouth once daily. 90 tablet 1    potassium chloride SA (K-DUR,KLOR-CON) 20 MEQ tablet Take 1 tablet (20 mEq total) by mouth 2 (two) times daily. 2 tablet 0    rosuvastatin (CRESTOR) 10 MG tablet Take 1 tablet (10 mg total) by mouth once daily. 90 tablet 3    sertraline (ZOLOFT) 25 MG tablet Take 1 tablet (25 mg total) by mouth once daily. 90 tablet 0    cetirizine (ZYRTEC) 10 MG tablet Take 1 tablet (10 mg total) by mouth once daily. (Patient taking differently: Take 10 mg by mouth as needed for Allergies.) 30 tablet 5    EPINEPHrine (EPIPEN) 0.3 mg/0.3 mL AtIn Inject 0.3 mLs (0.3 mg total) into the muscle once. for 1 dose 2 each 0    [DISCONTINUED] rosuvastatin (CRESTOR) 10 MG tablet Take 1 tablet (10 mg total) by mouth once daily. 90 tablet 1     Facility-Administered Encounter Medications as of 8/19/2024   Medication Dose Route Frequency Provider Last Rate Last Admin    alteplase injection 2 mg  2 mg Intra-Catheter PRN          No orders of the defined types were placed in this encounter.    Plan:     Problem List Items Addressed This Visit          Cardiac/Vascular    Dyspnea on exertion     - further eval with walk         Relevant Orders    Stress test, pulmonary    Pulmonary HTN     - severe sleep apnea contributing  - nocturnal oxygen ordered, discussed that this is not a substitute for CPAP, but may provide some benefit         Relevant Orders    OXYGEN FOR HOME USE       Oncology    Peritoneal carcinomatosis    Relevant Orders    OXYGEN FOR HOME USE    Ovarian cancer, bilateral     - disease progression on PET 7/2024            Other    Sleep-disordered breathing - Primary    JERALD (obstructive sleep apnea)     - severe sleep apnea and hypoxemia  - not having significant  daytime symptoms, pt does not wish to proceed with cpap therapy          Follow up in 6 months or sooner as needed.

## 2024-08-20 DIAGNOSIS — R30.0 DYSURIA: ICD-10-CM

## 2024-08-20 DIAGNOSIS — M79.602 LEFT ARM PAIN: Primary | ICD-10-CM

## 2024-08-20 RX ORDER — TRAMADOL HYDROCHLORIDE 50 MG/1
50 TABLET ORAL EVERY 12 HOURS PRN
Qty: 30 TABLET | Refills: 0 | Status: SHIPPED | OUTPATIENT
Start: 2024-08-20

## 2024-08-20 NOTE — PROCEDURES
"[facility] - Pulmonary Function  Six Minute Walk     SUMMARY     Ordering Provider: Nita MEDEROS   Interpreting Provider: Eddie VELARDE  Performing nurse/tech/RT: LS RRT  Diagnosis: Exertional Dyspnea  Height: 5' 7" (170.2 cm)  Weight: 111.3 kg (245 lb 6 oz)  BMI (Calculated): 38.4   Patient Race:             Phase Oxygen Assessment Supplemental O2 Heart   Rate Blood Pressure Vish Dyspnea Scale Rating   Resting 93 % Room Air 77 bpm 102/54 3   Exercise        Minute        1 91 % Room Air 92 bpm     2 91 % Room Air 93 bpm     3 90 % Room Air 92 bpm     4 92 % Room Air 94 bpm     5 91 % Room Air 94 bpm     6  92 % Room Air 91 bpm 118/55 4   Recovery        Minute        1 94 % Room Air 81 bpm     2 96 % Room Air 73 bpm     3 93 % Room Air 74 bpm     4 93 % Room Air 73 bpm 111/55 3     Six Minute Walk Summary  6MWT Status: completed without stopping  Patient Reported: Dizziness (Hip pain)     Interpretation:  Did the patient stop or pause?: No                                         Total Time Walked (Calculated): 360 seconds  Final Partial Lap Distance (feet): 150 feet  Total Distance Meters (Calculated): 289.56 meters  Predicted Distance Meters (Calculated): 372.43 meters  Percentage of Predicted (Calculated): 77.75  Peak VO2 (Calculated): 12.67  Mets: 3.62  Has The Patient Had a Previous Six Minute Walk Test?: No       Previous 6MWT Results  Has The Patient Had a Previous Six Minute Walk Test?: No      Interpretation:  Total distance walked in six minutes is mildly reduced indicating a reduction in overall  functional capacity. The patient did not meet criteria for supplemental oxygen prescription.  Clinical correlation suggested.  [] Mild exercise-induced hypoxemia described as an arterial oxygen saturation of 93-95% (with a fall of 3-4% with exercise),   [x] Moderate exercise-induced hypoxemia as a fall in oxygen saturation to  89-93% (with a fall of 3-4 % with exercise)  [] Severe exercise induced hypoxemia " as < 89% O2 saturation (88% and below).  Medicare Criteria for Oxygen prescription comments: When arterial oxygen saturation is at or below 88% during exercise (severe exercise induced hypoxemia) then the patient falls under   Details about Medicare Group Criteria coverage can be found at http://www.cms.[facility].gov/manuals/downloads/     Cyrus Morgan MD

## 2024-08-20 NOTE — PROGRESS NOTES
Discussed with patient that folate receptor alpha testing could not be completed on recent biopsy due to lack of specimen. She is in agreement with repeat biopsy.      I also discussed that given the delay since progression I recommend we proceed with chemotherapy next week at the latest and still pursue serum and tissue testing for FRa testing.  If FRa testing is positive and results by early next week we will proceed with Mirvetuximab.

## 2024-08-21 ENCOUNTER — TELEPHONE (OUTPATIENT)
Dept: RADIOLOGY | Facility: HOSPITAL | Age: 69
End: 2024-08-21
Payer: MEDICARE

## 2024-08-21 NOTE — TELEPHONE ENCOUNTER
Interventional Radiology  Called pt. regarding her lymph node biopsy tomorrow, 8/22/24 @ 10:30AM.  Reminded her to arrive by 9:00AM to the hospital ([address]/ [address]) off [address] in [place].  She confirmed she will have a  and be NPO after midnight tonight.  She can take morning medications with a sip of water.  She does not take ASA or other blood thinners, NSAIDS, fish oil or GLP-1/GIP agonists.  She verbalized understanding of all instructions.

## 2024-08-22 ENCOUNTER — HOSPITAL ENCOUNTER (OUTPATIENT)
Dept: RADIOLOGY | Facility: HOSPITAL | Age: 69
Discharge: HOME | End: 2024-08-22
Attending: INTERNAL MEDICINE
Payer: MEDICARE

## 2024-08-22 VITALS
DIASTOLIC BLOOD PRESSURE: 59 MMHG | HEIGHT: 67 IN | OXYGEN SATURATION: 93 % | WEIGHT: 245 LBS | BODY MASS INDEX: 38.45 KG/M2 | SYSTOLIC BLOOD PRESSURE: 123 MMHG | HEART RATE: 73 BPM | RESPIRATION RATE: 16 BRPM

## 2024-08-22 DIAGNOSIS — M79.602 LEFT ARM PAIN: ICD-10-CM

## 2024-08-22 DIAGNOSIS — R59.1 LYMPHADENOPATHY: ICD-10-CM

## 2024-08-22 LAB
ALBUMIN SERPL BCP-MCNC: 3.5 G/DL (ref 3.5–5.2)
ALP SERPL-CCNC: 112 U/L (ref 55–135)
ALT SERPL W/O P-5'-P-CCNC: 33 U/L (ref 10–44)
ANION GAP SERPL CALC-SCNC: 13 MMOL/L (ref 8–16)
AST SERPL-CCNC: 45 U/L (ref 10–40)
BASOPHILS # BLD AUTO: 0.05 K/UL (ref 0–0.2)
BASOPHILS NFR BLD: 0.7 % (ref 0–1.9)
BILIRUB SERPL-MCNC: 0.6 MG/DL (ref 0.1–1)
BUN SERPL-MCNC: 23 MG/DL (ref 8–23)
CALCIUM SERPL-MCNC: 9.7 MG/DL (ref 8.7–10.5)
CHLORIDE SERPL-SCNC: 106 MMOL/L (ref 95–110)
CO2 SERPL-SCNC: 26 MMOL/L (ref 23–29)
CREAT SERPL-MCNC: 1.1 MG/DL (ref 0.5–1.4)
DIFFERENTIAL METHOD BLD: ABNORMAL
EOSINOPHIL # BLD AUTO: 0.1 K/UL (ref 0–0.5)
EOSINOPHIL NFR BLD: 1.1 % (ref 0–8)
ERYTHROCYTE [DISTWIDTH] IN BLOOD BY AUTOMATED COUNT: 15.7 % (ref 11.5–14.5)
EST. GFR  (NO RACE VARIABLE): 54 ML/MIN/1.73 M^2
GLUCOSE SERPL-MCNC: 86 MG/DL (ref 70–110)
HCT VFR BLD AUTO: 34.7 % (ref 37–48.5)
HGB BLD-MCNC: 11 G/DL (ref 12–16)
IMM GRANULOCYTES # BLD AUTO: 0.02 K/UL (ref 0–0.04)
IMM GRANULOCYTES NFR BLD AUTO: 0.3 % (ref 0–0.5)
INR PPP: 1 (ref 0.8–1.2)
LYMPHOCYTES # BLD AUTO: 1.3 K/UL (ref 1–4.8)
LYMPHOCYTES NFR BLD: 19 % (ref 18–48)
MCH RBC QN AUTO: 27.3 PG (ref 27–31)
MCHC RBC AUTO-ENTMCNC: 31.7 G/DL (ref 32–36)
MCV RBC AUTO: 86 FL (ref 82–98)
MONOCYTES # BLD AUTO: 0.5 K/UL (ref 0.3–1)
MONOCYTES NFR BLD: 7.4 % (ref 4–15)
NEUTROPHILS # BLD AUTO: 5 K/UL (ref 1.8–7.7)
NEUTROPHILS NFR BLD: 71.5 % (ref 38–73)
NRBC BLD-RTO: 0 /100 WBC
PLATELET # BLD AUTO: 261 K/UL (ref 150–450)
PMV BLD AUTO: 9.4 FL (ref 9.2–12.9)
POTASSIUM SERPL-SCNC: 3.6 MMOL/L (ref 3.5–5.1)
PROT SERPL-MCNC: 7.1 G/DL (ref 6–8.4)
PROTHROMBIN TIME: 11.4 SEC (ref 9–12.5)
RBC # BLD AUTO: 4.03 M/UL (ref 4–5.4)
SODIUM SERPL-SCNC: 145 MMOL/L (ref 136–145)
WBC # BLD AUTO: 7.04 K/UL (ref 3.9–12.7)

## 2024-08-22 PROCEDURE — 63600175 PHARM REV CODE 636 W HCPCS: Mod: HCNC | Performed by: RADIOLOGY

## 2024-08-22 PROCEDURE — 93971 EXTREMITY STUDY: CPT | Mod: TC,HCNC,LT

## 2024-08-22 PROCEDURE — 77012 CT SCAN FOR NEEDLE BIOPSY: CPT | Mod: 26,HCNC,LT, | Performed by: RADIOLOGY

## 2024-08-22 PROCEDURE — 80053 COMPREHEN METABOLIC PANEL: CPT | Mod: HCNC | Performed by: RADIOLOGY

## 2024-08-22 PROCEDURE — A4215 STERILE NEEDLE: HCPCS | Mod: HCNC

## 2024-08-22 PROCEDURE — 88185 FLOWCYTOMETRY/TC ADD-ON: CPT | Mod: 59,HCNC | Performed by: PATHOLOGY

## 2024-08-22 PROCEDURE — 38505 NEEDLE BIOPSY LYMPH NODES: CPT | Mod: HCNC,LT,, | Performed by: RADIOLOGY

## 2024-08-22 PROCEDURE — 93971 EXTREMITY STUDY: CPT | Mod: 26,HCNC,LT, | Performed by: STUDENT IN AN ORGANIZED HEALTH CARE EDUCATION/TRAINING PROGRAM

## 2024-08-22 PROCEDURE — 85025 COMPLETE CBC W/AUTO DIFF WBC: CPT | Mod: HCNC | Performed by: RADIOLOGY

## 2024-08-22 PROCEDURE — 77012 CT SCAN FOR NEEDLE BIOPSY: CPT | Mod: TC,HCNC

## 2024-08-22 PROCEDURE — 85610 PROTHROMBIN TIME: CPT | Mod: HCNC | Performed by: RADIOLOGY

## 2024-08-22 PROCEDURE — 88184 FLOWCYTOMETRY/ TC 1 MARKER: CPT | Mod: HCNC | Performed by: PATHOLOGY

## 2024-08-22 RX ORDER — MIDAZOLAM HYDROCHLORIDE 1 MG/ML
INJECTION, SOLUTION INTRAMUSCULAR; INTRAVENOUS CODE/TRAUMA/SEDATION MEDICATION
Status: COMPLETED | OUTPATIENT
Start: 2024-08-22 | End: 2024-08-22

## 2024-08-22 RX ORDER — FENTANYL CITRATE 50 UG/ML
INJECTION, SOLUTION INTRAMUSCULAR; INTRAVENOUS CODE/TRAUMA/SEDATION MEDICATION
Status: COMPLETED | OUTPATIENT
Start: 2024-08-22 | End: 2024-08-22

## 2024-08-22 RX ADMIN — FENTANYL CITRATE 50 MCG: 0.05 INJECTION, SOLUTION INTRAMUSCULAR; INTRAVENOUS at 11:08

## 2024-08-22 RX ADMIN — MIDAZOLAM 1 MG: 1 INJECTION INTRAMUSCULAR; INTRAVENOUS at 11:08

## 2024-08-22 NOTE — DISCHARGE INSTRUCTIONS
Please return to ER if any of these symptoms occur:  Fever over 101 degrees,  Any purulent drainage from site (pus, yellow or has foul odor), or any redness or swelling to site  Bleeding from the puncture site not controlled, If bleeding occurs at site hold pressure for 5 mins.  If bleeding continues go to ER  Pain not controlled with Aleve or Tylenol,     No driving for 24 hours after procedure due to sedation given during procedure.      Do not submerge in standing water for 2 days after biopsy but you may shower.     May change bandage if it becomes soiled and bandage may be removed in 2 days.     Rest for the next couple of days. Do not lift any thing heavier than a gallon of milk.  Increase activity as tolerated.     Resume home medications and diet     Biopsy results will be with Dr. House in 5-7 days, please follow up with her for results and any other questions or concerns that you may have.

## 2024-08-22 NOTE — PLAN OF CARE
Band aid to left chest, C/D/I with no bleeding/redness/swelling noted. VSS, NADN, and pt meets criteria for discharge. Discharge instructions given to and reviewed with pt, and pt verbalized understanding of all. Pt discharged to home, taken out via wheelchair and driven home by spouse.

## 2024-08-22 NOTE — DISCHARGE SUMMARY
[facility] - Lab & Imaging (Hospital)  Discharge Note  Short Stay    CT Biopsy Lymph Node (xpd)      OUTCOME: Patient tolerated treatment/procedure well without complication and is now ready for discharge.    DISPOSITION: Home or Self Care    FINAL DIAGNOSIS:  <principal problem not specified>    FOLLOWUP: In clinic    DISCHARGE INSTRUCTIONS:  No discharge procedures on file.      Clinical Reference Documents Added to Patient Instructions         Document    LYMPH NODE BIOPSY (ENGLISH)    PROCEDURAL SEDATION, ADULT ED (ENGLISH)            TIME SPENT ON DISCHARGE: 15 minutes    Pre Op Diagnosis: left sub pet lymph node      Post Op Diagnosis: same     Procedure:  biopsy     Procedure performed by: Jeny VELARDE, Maurisio GARCIA     Written Informed Consent Obtained: Yes     Specimen Removed:  yes     Estimated Blood Loss:  minimal     Findings: Local anesthesia     Sedation:  yes     The patient tolerated the procedure well and there were no complications.      Disposition:  F/U in clinic or with ordering physician    Discharge instructions:  Light activity for 24 hours.  Remove band aid in 24 hours.  No baths (showers are appropriate).      Sterile technique was performed in the upper chest, lidocaine was used as a local anesthetic.  Multiple samples taken percutaneously from the node.  Pt tolerated the procedure well without immediate complications.  Please see radiologist report for details. F/u with PCP and/or ordering physician.

## 2024-08-23 LAB
FINAL PATHOLOGIC DIAGNOSIS: ABNORMAL
FLOW CYTOMETRY ANTIBODIES ANALYZED - LYMPH NODE: NORMAL
FLOW CYTOMETRY COMMENT - LYMPH NODE: NORMAL
FLOW CYTOMETRY INTERPRETATION - LYMPH NODE: NORMAL
GROSS: ABNORMAL
Lab: ABNORMAL

## 2024-08-26 ENCOUNTER — TELEPHONE (OUTPATIENT)
Dept: RADIOLOGY | Facility: HOSPITAL | Age: 69
End: 2024-08-26
Payer: MEDICARE

## 2024-08-26 DIAGNOSIS — R59.1 LYMPHADENOPATHY: Primary | ICD-10-CM

## 2024-08-28 ENCOUNTER — PATIENT MESSAGE (OUTPATIENT)
Dept: HEMATOLOGY/ONCOLOGY | Facility: CLINIC | Age: 69
End: 2024-08-28
Payer: MEDICARE

## 2024-08-29 ENCOUNTER — DOCUMENTATION ONLY (OUTPATIENT)
Dept: HEMATOLOGY/ONCOLOGY | Facility: CLINIC | Age: 69
End: 2024-08-29
Payer: MEDICARE

## 2024-08-29 DIAGNOSIS — C56.3 MALIGNANT NEOPLASM OF BOTH OVARIES: Primary | ICD-10-CM

## 2024-08-29 NOTE — PROGRESS NOTES
Genetic testing resulted from repeat bx. Dr. House placed new chemo plan. Call was placed to pt to update her and schedule needed appointments; pt agrees and verbalized understanding. Pt reports she feels better today than yesterday after some pain medicine and a nights rest. She denies any further needs at this time. Encourage pt to reach out with any needs/concerns.     Oncology Navigation   Intake  Date of Diagnosis: 24 (repeat LN bx)  Cancer Type: Gynecologic  Type of Referral: Internal  Initial Nurse Navigator Contact: 24  Start of Treatment: 24  Diagnosis to Treat Timeline (days): 15 days     Treatment  Current Status: Active       Medical Oncologist: Harsh  Chemotherapy: Planned  Chemotherapy Regimen: Doxil  Delays/Reductions Due to:: Other (insufficient tissue for genetic testing, repeat bx needed)  Immunotherapy: Planned  Immunotherapy Name: Ailyn  Start Date: 24                       Acuity  Stage: 2  Systemic Treatment - predicted or initiated: Chemotherapy Regimen with Multiple drugs (+1)  Treatment Tolerability: Has not started treatment yet/treatment fully completed and side effects resolved  ECO  Comorbidities in Medical History: 1  Hospitalization Within the Past Month: 0   Needed: 0  Support: 0  Verbalizes Financial Concerns: 0  Transportation: 0  History of noncompliance/frequent no shows and cancellations: 0  Verbalizes the need for more education: 1  Navigation Acuity: 6     Follow Up  No follow-ups on file.

## 2024-08-30 ENCOUNTER — LAB VISIT (OUTPATIENT)
Dept: LAB | Facility: HOSPITAL | Age: 69
End: 2024-08-30
Attending: INTERNAL MEDICINE
Payer: MEDICARE

## 2024-08-30 ENCOUNTER — CLINICAL SUPPORT (OUTPATIENT)
Dept: HEMATOLOGY/ONCOLOGY | Facility: CLINIC | Age: 69
End: 2024-08-30
Payer: MEDICARE

## 2024-08-30 DIAGNOSIS — C56.3 MALIGNANT NEOPLASM OF BOTH OVARIES: ICD-10-CM

## 2024-08-30 DIAGNOSIS — C56.3 MALIGNANT NEOPLASM OF BOTH OVARIES: Primary | ICD-10-CM

## 2024-08-30 DIAGNOSIS — Z90.722 S/P BSO (BILATERAL SALPINGO-OOPHORECTOMY): ICD-10-CM

## 2024-08-30 DIAGNOSIS — C56.3 OVARIAN CANCER, BILATERAL: ICD-10-CM

## 2024-08-30 LAB
ALBUMIN SERPL BCP-MCNC: 3.3 G/DL (ref 3.5–5.2)
ALP SERPL-CCNC: 116 U/L (ref 55–135)
ALT SERPL W/O P-5'-P-CCNC: 48 U/L (ref 10–44)
ANION GAP SERPL CALC-SCNC: 12 MMOL/L (ref 8–16)
AST SERPL-CCNC: 60 U/L (ref 10–40)
BASOPHILS # BLD AUTO: 0.1 K/UL (ref 0–0.2)
BASOPHILS NFR BLD: 1.2 % (ref 0–1.9)
BILIRUB SERPL-MCNC: 0.6 MG/DL (ref 0.1–1)
BILIRUB UR QL STRIP: NEGATIVE
BUN SERPL-MCNC: 36 MG/DL (ref 8–23)
CALCIUM SERPL-MCNC: 9.8 MG/DL (ref 8.7–10.5)
CHLORIDE SERPL-SCNC: 105 MMOL/L (ref 95–110)
CLARITY UR: ABNORMAL
CO2 SERPL-SCNC: 27 MMOL/L (ref 23–29)
COLOR UR: YELLOW
CREAT SERPL-MCNC: 1.6 MG/DL (ref 0.5–1.4)
DIFFERENTIAL METHOD BLD: ABNORMAL
EOSINOPHIL # BLD AUTO: 0.1 K/UL (ref 0–0.5)
EOSINOPHIL NFR BLD: 1.6 % (ref 0–8)
ERYTHROCYTE [DISTWIDTH] IN BLOOD BY AUTOMATED COUNT: 15.5 % (ref 11.5–14.5)
EST. GFR  (NO RACE VARIABLE): 35 ML/MIN/1.73 M^2
GLUCOSE SERPL-MCNC: 103 MG/DL (ref 70–110)
GLUCOSE UR QL STRIP: NEGATIVE
HCT VFR BLD AUTO: 35.6 % (ref 37–48.5)
HGB BLD-MCNC: 11 G/DL (ref 12–16)
HGB UR QL STRIP: NEGATIVE
IMM GRANULOCYTES # BLD AUTO: 0.02 K/UL (ref 0–0.04)
IMM GRANULOCYTES NFR BLD AUTO: 0.2 % (ref 0–0.5)
KETONES UR QL STRIP: NEGATIVE
LEUKOCYTE ESTERASE UR QL STRIP: NEGATIVE
LYMPHOCYTES # BLD AUTO: 1.5 K/UL (ref 1–4.8)
LYMPHOCYTES NFR BLD: 18.2 % (ref 18–48)
MCH RBC QN AUTO: 27.4 PG (ref 27–31)
MCHC RBC AUTO-ENTMCNC: 30.9 G/DL (ref 32–36)
MCV RBC AUTO: 89 FL (ref 82–98)
MONOCYTES # BLD AUTO: 0.5 K/UL (ref 0.3–1)
MONOCYTES NFR BLD: 6.5 % (ref 4–15)
NEUTROPHILS # BLD AUTO: 5.8 K/UL (ref 1.8–7.7)
NEUTROPHILS NFR BLD: 72.3 % (ref 38–73)
NITRITE UR QL STRIP: NEGATIVE
NRBC BLD-RTO: 0 /100 WBC
PH UR STRIP: 6 [PH] (ref 5–8)
PLATELET # BLD AUTO: 315 K/UL (ref 150–450)
PMV BLD AUTO: 9.7 FL (ref 9.2–12.9)
POTASSIUM SERPL-SCNC: 3.7 MMOL/L (ref 3.5–5.1)
PROT SERPL-MCNC: 7 G/DL (ref 6–8.4)
PROT UR QL STRIP: ABNORMAL
RBC # BLD AUTO: 4.02 M/UL (ref 4–5.4)
SODIUM SERPL-SCNC: 144 MMOL/L (ref 136–145)
SP GR UR STRIP: 1.02 (ref 1–1.03)
URN SPEC COLLECT METH UR: ABNORMAL
UROBILINOGEN UR STRIP-ACNC: NEGATIVE EU/DL
WBC # BLD AUTO: 8.01 K/UL (ref 3.9–12.7)

## 2024-08-30 PROCEDURE — 80053 COMPREHEN METABOLIC PANEL: CPT | Mod: HCNC | Performed by: INTERNAL MEDICINE

## 2024-08-30 PROCEDURE — 36415 COLL VENOUS BLD VENIPUNCTURE: CPT | Mod: HCNC | Performed by: INTERNAL MEDICINE

## 2024-08-30 PROCEDURE — 99999 PR PBB SHADOW E&M-EST. PATIENT-LVL III: CPT | Mod: PBBFAC,HCNC,,

## 2024-08-30 PROCEDURE — 85025 COMPLETE CBC W/AUTO DIFF WBC: CPT | Mod: HCNC | Performed by: INTERNAL MEDICINE

## 2024-08-30 PROCEDURE — 81003 URINALYSIS AUTO W/O SCOPE: CPT | Mod: HCNC | Performed by: INTERNAL MEDICINE

## 2024-09-03 ENCOUNTER — HOSPITAL ENCOUNTER (OUTPATIENT)
Dept: CARDIOLOGY | Facility: HOSPITAL | Age: 69
Discharge: HOME | End: 2024-09-03
Attending: INTERNAL MEDICINE
Payer: MEDICARE

## 2024-09-03 VITALS
SYSTOLIC BLOOD PRESSURE: 123 MMHG | HEIGHT: 67 IN | DIASTOLIC BLOOD PRESSURE: 59 MMHG | BODY MASS INDEX: 38.45 KG/M2 | WEIGHT: 245 LBS

## 2024-09-03 DIAGNOSIS — C56.3 MALIGNANT NEOPLASM OF BOTH OVARIES: ICD-10-CM

## 2024-09-03 LAB
ASCENDING AORTA: 3.06 CM
AV INDEX (PROSTH): 0.93
AV MEAN GRADIENT: 5 MMHG
AV PEAK GRADIENT: 10 MMHG
AV VALVE AREA BY VELOCITY RATIO: 3.2 CM²
AV VALVE AREA: 3.38 CM²
AV VELOCITY RATIO: 0.88
BSA FOR ECHO PROCEDURE: 2.29 M2
CV ECHO LV RWT: 0.43 CM
DOP CALC AO PEAK VEL: 1.59 M/S
DOP CALC AO VTI: 32.6 CM
DOP CALC LVOT AREA: 3.6 CM2
DOP CALC LVOT DIAMETER: 2.15 CM
DOP CALC LVOT PEAK VEL: 1.4 M/S
DOP CALC LVOT STROKE VOLUME: 110.31 CM3
DOP CALC RVOT PEAK VEL: 0.87 M/S
DOP CALC RVOT VTI: 18.5 CM
DOP CALCLVOT PEAK VEL VTI: 30.4 CM
E WAVE DECELERATION TIME: 227.43 MSEC
E/A RATIO: 1.06
E/E' RATIO: 9.44 M/S
ECHO LV POSTERIOR WALL: 0.94 CM (ref 0.6–1.1)
FRACTIONAL SHORTENING: 49 % (ref 28–44)
GLOBAL LONGITUIDAL STRAIN: 19.5 %
INTERVENTRICULAR SEPTUM: 1.18 CM (ref 0.6–1.1)
IVC DIAMETER: 1.42 CM
IVRT: 88.49 MSEC
LA MAJOR: 5.88 CM
LA MINOR: 5.49 CM
LA WIDTH: 4.1 CM
LEFT ATRIUM AREA SYSTOLIC (APICAL 2 CHAMBER): 17.33 CM2
LEFT ATRIUM AREA SYSTOLIC (APICAL 4 CHAMBER): 21.95 CM2
LEFT ATRIUM SIZE: 3.24 CM
LEFT ATRIUM VOLUME INDEX MOD: 23.9 ML/M2
LEFT ATRIUM VOLUME INDEX: 29.1 ML/M2
LEFT ATRIUM VOLUME MOD: 52.53 CM3
LEFT ATRIUM VOLUME: 64.12 CM3
LEFT INTERNAL DIMENSION IN SYSTOLE: 2.24 CM (ref 2.1–4)
LEFT VENTRICLE DIASTOLIC VOLUME INDEX: 40.11 ML/M2
LEFT VENTRICLE DIASTOLIC VOLUME: 88.24 ML
LEFT VENTRICLE END SYSTOLIC VOLUME APICAL 2 CHAMBER: 41.7 ML
LEFT VENTRICLE END SYSTOLIC VOLUME APICAL 4 CHAMBER: 64.99 ML
LEFT VENTRICLE MASS INDEX: 73 G/M2
LEFT VENTRICLE SYSTOLIC VOLUME INDEX: 7.7 ML/M2
LEFT VENTRICLE SYSTOLIC VOLUME: 16.87 ML
LEFT VENTRICULAR INTERNAL DIMENSION IN DIASTOLE: 4.41 CM (ref 3.5–6)
LEFT VENTRICULAR MASS: 160.91 G
LV LATERAL E/E' RATIO: 7.73 M/S
LV SEPTAL E/E' RATIO: 12.14 M/S
LVED V (TEICH): 88.24 ML
LVES V (TEICH): 16.87 ML
LVOT MG: 4.01 MMHG
LVOT MV: 0.94 CM/S
MV PEAK A VEL: 0.8 M/S
MV PEAK E VEL: 0.85 M/S
MV STENOSIS PRESSURE HALF TIME: 65.95 MS
MV VALVE AREA P 1/2 METHOD: 3.34 CM2
OHS CV RV/LV RATIO: 0.97 CM
OHS LV EJECTION FRACTION SIMPSONS BIPLANE MOD: 56 %
PV MEAN GRADIENT: 2 MMHG
PV MV: 0.7 M/S
PV PEAK GRADIENT: 3 MMHG
PV PEAK VELOCITY: 1.06 M/S
RA MAJOR: 4.53 CM
RA PRESSURE ESTIMATED: 3 MMHG
RA WIDTH: 3.79 CM
RIGHT VENTRICULAR END-DIASTOLIC DIMENSION: 4.28 CM
SINUS: 3.01 CM
STJ: 3.04 CM
TDI LATERAL: 0.11 M/S
TDI SEPTAL: 0.07 M/S
TDI: 0.09 M/S
Z-SCORE OF LEFT VENTRICULAR DIMENSION IN END DIASTOLE: -5.33
Z-SCORE OF LEFT VENTRICULAR DIMENSION IN END SYSTOLE: -5.63

## 2024-09-03 PROCEDURE — 93356 MYOCRD STRAIN IMG SPCKL TRCK: CPT | Mod: HCNC,,, | Performed by: INTERNAL MEDICINE

## 2024-09-03 PROCEDURE — 93306 TTE W/DOPPLER COMPLETE: CPT | Mod: 26,HCNC,, | Performed by: INTERNAL MEDICINE

## 2024-09-03 PROCEDURE — 93306 TTE W/DOPPLER COMPLETE: CPT | Mod: HCNC

## 2024-09-05 ENCOUNTER — OFFICE VISIT (OUTPATIENT)
Dept: HEMATOLOGY/ONCOLOGY | Facility: CLINIC | Age: 69
End: 2024-09-05
Payer: MEDICARE

## 2024-09-05 DIAGNOSIS — K59.00 CONSTIPATION, UNSPECIFIED CONSTIPATION TYPE: ICD-10-CM

## 2024-09-05 DIAGNOSIS — C56.1 MALIGNANT NEOPLASM OF RIGHT OVARY: ICD-10-CM

## 2024-09-05 DIAGNOSIS — G62.0 CHEMOTHERAPY-INDUCED PERIPHERAL NEUROPATHY: ICD-10-CM

## 2024-09-05 DIAGNOSIS — C78.6 PERITONEAL CARCINOMATOSIS: Primary | ICD-10-CM

## 2024-09-05 DIAGNOSIS — T45.1X5A CHEMOTHERAPY-INDUCED PERIPHERAL NEUROPATHY: ICD-10-CM

## 2024-09-05 RX ORDER — PROCHLORPERAZINE EDISYLATE 5 MG/ML
5 INJECTION INTRAMUSCULAR; INTRAVENOUS ONCE AS NEEDED
OUTPATIENT
Start: 2024-09-21

## 2024-09-05 RX ORDER — SODIUM CHLORIDE 0.9 % (FLUSH) 0.9 %
10 SYRINGE (ML) INJECTION
Status: CANCELLED | OUTPATIENT
Start: 2024-09-07

## 2024-09-05 RX ORDER — DIPHENHYDRAMINE HYDROCHLORIDE 50 MG/ML
50 INJECTION INTRAMUSCULAR; INTRAVENOUS ONCE AS NEEDED
Status: CANCELLED | OUTPATIENT
Start: 2024-09-07

## 2024-09-05 RX ORDER — HEPARIN 100 UNIT/ML
500 SYRINGE INTRAVENOUS
OUTPATIENT
Start: 2024-09-21

## 2024-09-05 RX ORDER — ONDANSETRON HYDROCHLORIDE 2 MG/ML
8 INJECTION, SOLUTION INTRAVENOUS
Status: CANCELLED | OUTPATIENT
Start: 2024-09-07

## 2024-09-05 RX ORDER — AMOXICILLIN 250 MG
1 CAPSULE ORAL DAILY PRN
Qty: 30 TABLET | Refills: 3 | Status: SHIPPED | OUTPATIENT
Start: 2024-09-05

## 2024-09-05 RX ORDER — SODIUM CHLORIDE 0.9 % (FLUSH) 0.9 %
10 SYRINGE (ML) INJECTION
OUTPATIENT
Start: 2024-09-21

## 2024-09-05 RX ORDER — HEPARIN 100 UNIT/ML
500 SYRINGE INTRAVENOUS
Status: CANCELLED | OUTPATIENT
Start: 2024-09-07

## 2024-09-05 RX ORDER — EPINEPHRINE 0.3 MG/.3ML
0.3 INJECTION SUBCUTANEOUS ONCE AS NEEDED
OUTPATIENT
Start: 2024-09-21

## 2024-09-05 RX ORDER — EPINEPHRINE 0.3 MG/.3ML
0.3 INJECTION SUBCUTANEOUS ONCE AS NEEDED
Status: CANCELLED | OUTPATIENT
Start: 2024-09-07

## 2024-09-05 RX ORDER — PROCHLORPERAZINE EDISYLATE 5 MG/ML
5 INJECTION INTRAMUSCULAR; INTRAVENOUS ONCE AS NEEDED
Status: CANCELLED | OUTPATIENT
Start: 2024-09-07

## 2024-09-05 RX ORDER — DIPHENHYDRAMINE HYDROCHLORIDE 50 MG/ML
50 INJECTION INTRAMUSCULAR; INTRAVENOUS ONCE AS NEEDED
OUTPATIENT
Start: 2024-09-21

## 2024-09-05 NOTE — PROGRESS NOTES
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  - Right ureteral obstruction with indwelling ureteral stent     Previous Treatment:    - 02/2019 - 07/2019: Carboplatin, paclitaxel and Avastin x 6 cycles  - 08/15/2019:  salpingo-oophorectomy, ureterolysis/Omentectomy with complete pathologic response  - 10/2019 - 9/2020 Avastin maintenance   - Carboplatin and Paclitaxel (09/29/2023 - 02/14/2024)    Current Treatment:  Doxorubicin and Bevacizumab (09/06/24 - )    The patient location is: [place], [place]  The chief complaint leading to consultation is: Follow up    Visit type: audiovisual    Face to Face time with patient: 15  30 minutes of total time spent on the encounter, which includes face to face time and non-face to face time preparing to see the patient (eg, review of tests), Obtaining and/or reviewing separately obtained history, Documenting clinical information in the electronic or other health record, Independently interpreting results (not separately reported) and communicating results to the patient/family/caregiver, or Care coordination (not separately reported).     Each patient to whom he or she provides medical services by telemedicine is:  (1) informed of the relationship between the physician and patient and the respective role of any other health care provider with respect to management of the patient; and (2) notified that he or she may decline to receive medical services by telemedicine and may withdraw from such care at any time.    Notes:   Subjective   Casie Gaines is a pleasant 68 y.o. female who presents to clinic for follow up.    She continues to have the symptoms that she was dealing with for the last several weeks.  She is to start chemotherapy tomorrow morning.  We reviewed her pathology results as well as genetic testing.  All of her questions  were answered satisfactorily.    Review of Systems   Constitutional:  Positive for diaphoresis and fatigue. Negative for activity change, appetite change, chills, fever and unexpected weight change.   HENT:  Negative for nosebleeds.    Respiratory:  Negative for shortness of breath.    Gastrointestinal:  Positive for constipation.   Musculoskeletal:  Negative for back pain.   Skin:  Negative for rash.   Neurological:  Positive for numbness. Negative for dizziness, weakness, light-headedness and headaches.   Hematological:  Does not bruise/bleed easily.   Psychiatric/Behavioral:  The patient is not nervous/anxious.      History     Oncology History   Peritoneal carcinomatosis   1/26/2019 Initial Diagnosis    Peritoneal carcinomatosis     2/15/2019 - 7/8/2019 Chemotherapy    Treatment Summary   Plan Name: OP GYN PACLITAXEL CARBOPLATIN (AUC 6) Q3W  Treatment Goal: Palliative  Status: Inactive  Start Date: 2/28/2019  End Date: 6/18/2019  Provider: Will Trevizo Jr., MD  Chemotherapy: bevacizumab (AVASTIN) 15 mg/kg = 1,600 mg in sodium chloride 0.9% 100 mL chemo infusion, 15 mg/kg = 1,600 mg (100 % of original dose 15 mg/kg), Intravenous, Clinic/[facility] 1 time, 6 of 6 cycles  Dose modification: 15 mg/kg (original dose 15 mg/kg, Cycle 1)  Administration: 1,600 mg (2/28/2019), 1,600 mg (3/21/2019), 1,600 mg (4/11/2019), 1,600 mg (5/7/2019), 1,600 mg (5/28/2019), 1,510 mg (6/18/2019)  CARBOplatin (PARAPLATIN) 870 mg in sodium chloride 0.9% 337 mL chemo infusion, 870 mg (100 % of original dose 868.8 mg), Intravenous, Clinic/[facility] 1 time, 6 of 6 cycles  Dose modification:   (original dose 868.8 mg, Cycle 1)  Administration: 870 mg (2/28/2019), 870 mg (3/21/2019), 900 mg (4/11/2019), 870 mg (5/7/2019), 660 mg (5/28/2019), 690 mg (6/18/2019)  PACLitaxel (TAXOL) 175 mg/m2 = 396 mg in sodium chloride 0.9% 566 mL chemo infusion, 175 mg/m2 = 396 mg, Intravenous, Clinic/[facility] 1 time, 6 of 6 cycles  Dose modification: 140 mg/m2 (80 % of  original dose 175 mg/m2, Cycle 5)  Administration: 396 mg (2/28/2019), 396 mg (3/21/2019), 396 mg (4/11/2019), 396 mg (5/7/2019), 318 mg (5/28/2019), 306 mg (6/18/2019)     10/9/2019 - 9/24/2020 Chemotherapy    Treatment Summary   Plan Name: OP BEVACIZUMAB Q3W   Treatment Goal: Maintenance  Status: Inactive  Start Date: 10/9/2019  End Date: 9/24/2020  Provider: Oscar Mckeon MD  Chemotherapy: dexAMETHasone tablet 8 mg, 8 mg (100 % of original dose 8 mg), Oral, Clinic/[facility] 1 time, 2 of 8 cycles  Dose modification: 8 mg (original dose 8 mg, Cycle 8), 8 mg (original dose 8 mg, Cycle 12)  Administration: 8 mg (7/22/2020), 8 mg (8/13/2020)  bevacizumab (AVASTIN) 15 mg/kg = 1,615 mg in sodium chloride 0.9% 100 mL chemo infusion, 15 mg/kg = 1,615 mg, Intravenous, Clinic/[facility] 1 time, 11 of 17 cycles  Administration: 1,615 mg (10/9/2019), 1,615 mg (10/29/2019), 1,615 mg (12/10/2019), 1,615 mg (1/21/2020), 1,600 mg (4/2/2020), 1,615 mg (4/23/2020), 1,600 mg (7/1/2020), 1,600 mg (7/22/2020), 1,600 mg (8/13/2020), 1,795 mg (9/3/2020), 1,795 mg (9/24/2020)     9/29/2023 - 2/14/2024 Chemotherapy    Treatment Summary   Plan Name: OP GYN PACLITAXEL CARBOPLATIN desensitization (AUC 5) Q3W  Treatment Goal: Control  Status: Inactive  Start Date: 9/29/2023  End Date: 2/14/2024  Provider: Ismael Juarez MD  Chemotherapy: CARBOplatin (PARAPLATIN) 650 mg in sodium chloride 0.9% 335 mL chemo infusion, 650 mg (100 % of original dose 648 mg), Intravenous, Clinic/[facility] 1 time, 6 of 17 cycles  Dose modification:   (original dose 648 mg, Cycle 1),   (original dose 853.8 mg, Cycle 2),   (original dose 730 mg, Cycle 3),   (original dose 730 mg, Cycle 4), 5 mg (original dose 730 mg, Cycle 4), 7.3 mg (original dose 730 mg, Cycle 4), 5 mg (original dose 730 mg, Cycle 4, Reason: MD Discretion, Comment: desensitization), 73 mg (original dose 730 mg, Cycle 4), 720 mg (original dose 730 mg, Cycle 4, Reason: MD Discretion, Comment: desensitization), 648.97  mg (original dose 730 mg, Cycle 4, Reason: MD Discretion, Comment: desensitization), 0.61 mg (original dose 730 mg, Cycle 5, Reason: MD Discretion, Comment: desensitization), 5 mg (original dose 730 mg, Cycle 5, Reason: MD Discretion, Comment: desens), 60.5 mg (original dose 730 mg, Cycle 5, Reason: MD Discretion, Comment: desensitization),   (original dose 730 mg, Cycle 5, Reason: MD Discretion, Comment: new scr), 6.05 mg (original dose 730 mg, Cycle 5, Reason: Dose not tolerated, Comment: desensitization)  Administration: 650 mg (9/29/2023), 730 mg (11/10/2023), 710 mg (10/20/2023), 5 mg (1/3/2024), 5 mg (1/3/2024), 75 mg (1/3/2024), 650 mg (1/3/2024), 5 mg (2/14/2024), 5 mg (2/14/2024), 75 mg (2/14/2024), 650 mg (2/14/2024), 5 mg (1/24/2024), 60 mg (1/24/2024), 605 mg (1/24/2024), 5 mg (1/24/2024)  PACLitaxeL (TAXOL) 175 mg/m2 = 402 mg in sodium chloride 0.9% 500 mL chemo infusion, 175 mg/m2 = 402 mg, Intravenous, Clinic/[facility] 1 time, 6 of 17 cycles  Administration: 402 mg (9/29/2023), 402 mg (10/20/2023), 408 mg (11/10/2023), 408 mg (1/3/2024), 408 mg (1/24/2024), 408 mg (2/14/2024)     9/5/2024 -  Chemotherapy    Treatment Summary   Plan Name: OP GYN bevacizumab liposomal DOXOrubicin Q4W  Treatment Goal: Control  Status: Active  Start Date: 9/5/2024 (Planned)  End Date: 7/24/2025 (Planned)  Provider: Torri House MD  Chemotherapy: DOXOrubicin liposome (DOXIL) 92 mg in D5W 296 mL chemo infusion, 40 mg/m2 = 92 mg, Intravenous, Clinic/[facility] 1 time, 0 of 12 cycles  bevacizumab-awwb (MVASI) 10 mg/kg = 1,125 mg in 0.9% NaCl 100 mL infusion, 10 mg/kg = 1,125 mg, Intravenous, Clinic/[facility] 1 time, 0 of 12 cycles      Chemotherapy    Treatment Summary   Plan Name: OP GYN PACLITAXEL CARBOPLATIN (AUC 6) Q3W  Treatment Goal: Palliative  Status: Inactive  Start Date: 2/28/2019  End Date: 6/18/2019  Provider: Will Trevizo Jr., MD  Chemotherapy: bevacizumab (AVASTIN) 15 mg/kg = 1,600 mg in sodium chloride 0.9% 100 mL chemo  infusion, 15 mg/kg = 1,600 mg (100 % of original dose 15 mg/kg), Intravenous, Clinic/[facility] 1 time, 6 of 6 cycles  Dose modification: 15 mg/kg (original dose 15 mg/kg, Cycle 1)  Administration: 1,600 mg (2/28/2019), 1,600 mg (3/21/2019), 1,600 mg (4/11/2019), 1,600 mg (5/7/2019), 1,600 mg (5/28/2019), 1,510 mg (6/18/2019)    CARBOplatin (PARAPLATIN) 870 mg in sodium chloride 0.9% 337 mL chemo infusion, 870 mg (100 % of original dose 868.8 mg), Intravenous, Clinic/[facility] 1 time, 6 of 6 cycles  Dose modification:   (original dose 868.8 mg, Cycle 1)  Administration: 870 mg (2/28/2019), 870 mg (3/21/2019), 900 mg (4/11/2019), 870 mg (5/7/2019), 660 mg (5/28/2019), 690 mg (6/18/2019)    PACLitaxel (TAXOL) 175 mg/m2 = 396 mg in sodium chloride 0.9% 566 mL chemo infusion, 175 mg/m2 = 396 mg, Intravenous, Clinic/[facility] 1 time, 6 of 6 cycles  Dose modification: 140 mg/m2 (80 % of original dose 175 mg/m2, Cycle 5)  Administration: 396 mg (2/28/2019), 396 mg (3/21/2019), 396 mg (4/11/2019), 396 mg (5/7/2019), 318 mg (5/28/2019), 306 mg (6/18/2019)    Plan Name: OP BEVACIZUMAB Q3W   Treatment Goal: Maintenance  Status: Inactive  Start Date: 10/9/2019  End Date: 9/24/2020  Provider: Oscar Mckeon MD  Chemotherapy: bevacizumab (AVASTIN) 15 mg/kg = 1,615 mg in sodium chloride 0.9% 100 mL chemo infusion, 15 mg/kg = 1,615 mg, Intravenous, Clinic/[facility] 1 time, 11 of 17 cycles  Administration: 1,615 mg (10/9/2019), 1,615 mg (10/29/2019), 1,615 mg (12/10/2019), 1,615 mg (1/21/2020), 1,600 mg (4/2/2020), 1,615 mg (4/23/2020), 1,600 mg (7/1/2020), 1,600 mg (7/22/2020), 1,600 mg (8/13/2020), 1,795 mg (9/3/2020), 1,795 mg (9/24/2020)    Plan Name: OP GYN PACLITAXEL CARBOPLATIN desensitization (AUC 5) Q3W  Treatment Goal: Control  Status: Inactive  Start Date: 9/29/2023  End Date: 2/14/2024  Provider: Ismael Juarez MD  Chemotherapy: CARBOplatin (PARAPLATIN) 650 mg in sodium chloride 0.9% 335 mL chemo infusion, 650 mg (100 % of original dose 648 mg),  Intravenous, Clinic/[facility] 1 time, 6 of 17 cycles  Dose modification:   (original dose 648 mg, Cycle 1),   (original dose 853.8 mg, Cycle 2),   (original dose 730 mg, Cycle 3),   (original dose 730 mg, Cycle 4), 5 mg (original dose 730 mg, Cycle 4), 7.3 mg (original dose 730 mg, Cycle 4), 5 mg (original dose 730 mg, Cycle 4, Reason: MD Discretion, Comment: desensitization), 73 mg (original dose 730 mg, Cycle 4), 720 mg (original dose 730 mg, Cycle 4, Reason: MD Discretion, Comment: desensitization), 648.97 mg (original dose 730 mg, Cycle 4, Reason: MD Discretion, Comment: desensitization), 0.61 mg (original dose 730 mg, Cycle 5, Reason: MD Discretion, Comment: desensitization), 5 mg (original dose 730 mg, Cycle 5, Reason: MD Discretion, Comment: desens), 60.5 mg (original dose 730 mg, Cycle 5, Reason: MD Discretion, Comment: desensitization),   (original dose 730 mg, Cycle 5, Reason: MD Discretion, Comment: new scr), 6.05 mg (original dose 730 mg, Cycle 5, Reason: Dose not tolerated, Comment: desensitization)  Administration: 650 mg (9/29/2023), 730 mg (11/10/2023), 710 mg (10/20/2023), 5 mg (1/3/2024), 5 mg (1/3/2024), 75 mg (1/3/2024), 650 mg (1/3/2024), 5 mg (2/14/2024), 5 mg (2/14/2024), 75 mg (2/14/2024), 650 mg (2/14/2024), 5 mg (1/24/2024), 60 mg (1/24/2024), 605 mg (1/24/2024), 5 mg (1/24/2024)    PACLitaxeL (TAXOL) 175 mg/m2 = 402 mg in sodium chloride 0.9% 500 mL chemo infusion, 175 mg/m2 = 402 mg, Intravenous, Clinic/[facility] 1 time, 6 of 17 cycles  Administration: 402 mg (9/29/2023), 402 mg (10/20/2023), 408 mg (11/10/2023), 408 mg (1/3/2024), 408 mg (1/24/2024), 408 mg (2/14/2024)    Plan Name: OP GYN bevacizumab liposomal DOXOrubicin Q4W  Treatment Goal: Control  Status: Active  Start Date: 9/3/2024 (Planned)  End Date: 7/22/2025 (Planned)  Provider: Torri House MD  Chemotherapy: DOXOrubicin liposome (DOXIL) 92 mg in D5W 296 mL chemo infusion, 40 mg/m2 = 92 mg, Intravenous, Clinic/H[facility] 1 time, 0 of 12  cycles    bevacizumab-awwb (MVASI) 10 mg/kg = 1,125 mg in 0.9% NaCl 100 mL infusion, 10 mg/kg = 1,125 mg, Intravenous, Clinic/[facility] 1 time, 0 of 12 cycles       Malignant neoplasm of right ovary (Resolved)   2/15/2019 Initial Diagnosis    Malignant neoplasm of right ovary     2/15/2019 - 7/8/2019 Chemotherapy    Treatment Summary   Plan Name: OP GYN PACLITAXEL CARBOPLATIN (AUC 6) Q3W  Treatment Goal: Palliative  Status: Inactive  Start Date: 2/28/2019  End Date: 6/18/2019  Provider: iWll Trevizo Jr., MD  Chemotherapy: bevacizumab (AVASTIN) 15 mg/kg = 1,600 mg in sodium chloride 0.9% 100 mL chemo infusion, 15 mg/kg = 1,600 mg (100 % of original dose 15 mg/kg), Intravenous, Clinic/[facility] 1 time, 6 of 6 cycles  Dose modification: 15 mg/kg (original dose 15 mg/kg, Cycle 1)  Administration: 1,600 mg (2/28/2019), 1,600 mg (3/21/2019), 1,600 mg (4/11/2019), 1,600 mg (5/7/2019), 1,600 mg (5/28/2019), 1,510 mg (6/18/2019)  CARBOplatin (PARAPLATIN) 870 mg in sodium chloride 0.9% 337 mL chemo infusion, 870 mg (100 % of original dose 868.8 mg), Intravenous, Clinic/H[facility] 1 time, 6 of 6 cycles  Dose modification:   (original dose 868.8 mg, Cycle 1)  Administration: 870 mg (2/28/2019), 870 mg (3/21/2019), 900 mg (4/11/2019), 870 mg (5/7/2019), 660 mg (5/28/2019), 690 mg (6/18/2019)  PACLitaxel (TAXOL) 175 mg/m2 = 396 mg in sodium chloride 0.9% 566 mL chemo infusion, 175 mg/m2 = 396 mg, Intravenous, Clinic/H[facility] 1 time, 6 of 6 cycles  Dose modification: 140 mg/m2 (80 % of original dose 175 mg/m2, Cycle 5)  Administration: 396 mg (2/28/2019), 396 mg (3/21/2019), 396 mg (4/11/2019), 396 mg (5/7/2019), 318 mg (5/28/2019), 306 mg (6/18/2019)     10/9/2019 - 9/24/2020 Chemotherapy    Treatment Summary   Plan Name: OP BEVACIZUMAB Q3W   Treatment Goal: Maintenance  Status: Inactive  Start Date: 10/9/2019  End Date: 9/24/2020  Provider: Oscar Mckeon MD  Chemotherapy: dexAMETHasone tablet 8 mg, 8 mg (100 % of original dose 8 mg), Oral,  Clinic/HOD 1 time, 2 of 8 cycles  Dose modification: 8 mg (original dose 8 mg, Cycle 8), 8 mg (original dose 8 mg, Cycle 12)  Administration: 8 mg (7/22/2020), 8 mg (8/13/2020)  bevacizumab (AVASTIN) 15 mg/kg = 1,615 mg in sodium chloride 0.9% 100 mL chemo infusion, 15 mg/kg = 1,615 mg, Intravenous, Clinic/HOD 1 time, 11 of 17 cycles  Administration: 1,615 mg (10/9/2019), 1,615 mg (10/29/2019), 1,615 mg (12/10/2019), 1,615 mg (1/21/2020), 1,600 mg (4/2/2020), 1,615 mg (4/23/2020), 1,600 mg (7/1/2020), 1,600 mg (7/22/2020), 1,600 mg (8/13/2020), 1,795 mg (9/3/2020), 1,795 mg (9/24/2020)      Chemotherapy    Treatment Summary   Plan Name: OP GYN PACLITAXEL CARBOPLATIN (AUC 6) Q3W  Treatment Goal: Palliative  Status: Inactive  Start Date: 2/28/2019  End Date: 6/18/2019  Provider: Will Trevizo Jr., MD  Chemotherapy: bevacizumab (AVASTIN) 15 mg/kg = 1,600 mg in sodium chloride 0.9% 100 mL chemo infusion, 15 mg/kg = 1,600 mg (100 % of original dose 15 mg/kg), Intravenous, Clinic/HOD 1 time, 6 of 6 cycles  Dose modification: 15 mg/kg (original dose 15 mg/kg, Cycle 1)  Administration: 1,600 mg (2/28/2019), 1,600 mg (3/21/2019), 1,600 mg (4/11/2019), 1,600 mg (5/7/2019), 1,600 mg (5/28/2019), 1,510 mg (6/18/2019)    CARBOplatin (PARAPLATIN) 870 mg in sodium chloride 0.9% 337 mL chemo infusion, 870 mg (100 % of original dose 868.8 mg), Intravenous, Clinic/HOD 1 time, 6 of 6 cycles  Dose modification:   (original dose 868.8 mg, Cycle 1)  Administration: 870 mg (2/28/2019), 870 mg (3/21/2019), 900 mg (4/11/2019), 870 mg (5/7/2019), 660 mg (5/28/2019), 690 mg (6/18/2019)    PACLitaxel (TAXOL) 175 mg/m2 = 396 mg in sodium chloride 0.9% 566 mL chemo infusion, 175 mg/m2 = 396 mg, Intravenous, Clinic/[facility] 1 time, 6 of 6 cycles  Dose modification: 140 mg/m2 (80 % of original dose 175 mg/m2, Cycle 5)  Administration: 396 mg (2/28/2019), 396 mg (3/21/2019), 396 mg (4/11/2019), 396 mg (5/7/2019), 318 mg (5/28/2019), 306 mg  (6/18/2019)    Plan Name: OP BEVACIZUMAB Q3W   Treatment Goal: Maintenance  Status: Inactive  Start Date: 10/9/2019  End Date: 9/24/2020  Provider: Oscar Mckeon MD  Chemotherapy: bevacizumab (AVASTIN) 15 mg/kg = 1,615 mg in sodium chloride 0.9% 100 mL chemo infusion, 15 mg/kg = 1,615 mg, Intravenous, Clinic/HOD 1 time, 11 of 17 cycles  Administration: 1,615 mg (10/9/2019), 1,615 mg (10/29/2019), 1,615 mg (12/10/2019), 1,615 mg (1/21/2020), 1,600 mg (4/2/2020), 1,615 mg (4/23/2020), 1,600 mg (7/1/2020), 1,600 mg (7/22/2020), 1,600 mg (8/13/2020), 1,795 mg (9/3/2020), 1,795 mg (9/24/2020)    Plan Name: OP GYN PACLITAXEL CARBOPLATIN desensitization (AUC 5) Q3W  Treatment Goal: Control  Status: Inactive  Start Date: 9/29/2023  End Date: 2/14/2024  Provider: Ismael Juarez MD  Chemotherapy: CARBOplatin (PARAPLATIN) 650 mg in sodium chloride 0.9% 335 mL chemo infusion, 650 mg (100 % of original dose 648 mg), Intravenous, Clinic/HOD 1 time, 6 of 17 cycles  Dose modification:   (original dose 648 mg, Cycle 1),   (original dose 853.8 mg, Cycle 2),   (original dose 730 mg, Cycle 3),   (original dose 730 mg, Cycle 4), 5 mg (original dose 730 mg, Cycle 4), 7.3 mg (original dose 730 mg, Cycle 4), 5 mg (original dose 730 mg, Cycle 4, Reason: MD Discretion, Comment: desensitization), 73 mg (original dose 730 mg, Cycle 4), 720 mg (original dose 730 mg, Cycle 4, Reason: MD Discretion, Comment: desensitization), 648.97 mg (original dose 730 mg, Cycle 4, Reason: MD Discretion, Comment: desensitization), 0.61 mg (original dose 730 mg, Cycle 5, Reason: MD Discretion, Comment: desensitization), 5 mg (original dose 730 mg, Cycle 5, Reason: MD Discretion, Comment: desens), 60.5 mg (original dose 730 mg, Cycle 5, Reason: MD Discretion, Comment: desensitization),   (original dose 730 mg, Cycle 5, Reason: MD Discretion, Comment: new scr), 6.05 mg (original dose 730 mg, Cycle 5, Reason: Dose not tolerated, Comment:  desensitization)  Administration: 650 mg (9/29/2023), 730 mg (11/10/2023), 710 mg (10/20/2023), 5 mg (1/3/2024), 5 mg (1/3/2024), 75 mg (1/3/2024), 650 mg (1/3/2024), 5 mg (2/14/2024), 5 mg (2/14/2024), 75 mg (2/14/2024), 650 mg (2/14/2024), 5 mg (1/24/2024), 60 mg (1/24/2024), 605 mg (1/24/2024), 5 mg (1/24/2024)    PACLitaxeL (TAXOL) 175 mg/m2 = 402 mg in sodium chloride 0.9% 500 mL chemo infusion, 175 mg/m2 = 402 mg, Intravenous, Clinic/HOD 1 time, 6 of 17 cycles  Administration: 402 mg (9/29/2023), 402 mg (10/20/2023), 408 mg (11/10/2023), 408 mg (1/3/2024), 408 mg (1/24/2024), 408 mg (2/14/2024)    Plan Name: OP GYN bevacizumab liposomal DOXOrubicin Q4W  Treatment Goal: Control  Status: Active  Start Date: 9/3/2024 (Planned)  End Date: 7/22/2025 (Planned)  Provider: Torri House MD  Chemotherapy: DOXOrubicin liposome (DOXIL) 92 mg in D5W 296 mL chemo infusion, 40 mg/m2 = 92 mg, Intravenous, Clinic/HOD 1 time, 0 of 12 cycles    bevacizumab-awwb (MVASI) 10 mg/kg = 1,125 mg in 0.9% NaCl 100 mL infusion, 10 mg/kg = 1,125 mg, Intravenous, Clinic/HOD 1 time, 0 of 12 cycles       Malignant neoplasm of both ovaries (Resolved)   2/18/2019 Initial Diagnosis    Ovarian cancer     10/9/2019 - 9/24/2020 Chemotherapy    Treatment Summary   Plan Name: OP BEVACIZUMAB Q3W   Treatment Goal: Maintenance  Status: Inactive  Start Date: 10/9/2019  End Date: 9/24/2020  Provider: Oscar Mckeon MD  Chemotherapy: dexAMETHasone tablet 8 mg, 8 mg (100 % of original dose 8 mg), Oral, Clinic/HOD 1 time, 2 of 8 cycles  Dose modification: 8 mg (original dose 8 mg, Cycle 8), 8 mg (original dose 8 mg, Cycle 12)  Administration: 8 mg (7/22/2020), 8 mg (8/13/2020)  bevacizumab (AVASTIN) 15 mg/kg = 1,615 mg in sodium chloride 0.9% 100 mL chemo infusion, 15 mg/kg = 1,615 mg, Intravenous, Clinic/HOD 1 time, 11 of 17 cycles  Administration: 1,615 mg (10/9/2019), 1,615 mg (10/29/2019), 1,615 mg (12/10/2019), 1,615 mg (1/21/2020), 1,600 mg  (4/2/2020), 1,615 mg (4/23/2020), 1,600 mg (7/1/2020), 1,600 mg (7/22/2020), 1,600 mg (8/13/2020), 1,795 mg (9/3/2020), 1,795 mg (9/24/2020)      Chemotherapy    Treatment Summary   Plan Name: OP GYN PACLITAXEL CARBOPLATIN (AUC 6) Q3W  Treatment Goal: Palliative  Status: Inactive  Start Date: 2/28/2019  End Date: 6/18/2019  Provider: Will Trevizo Jr., MD  Chemotherapy: bevacizumab (AVASTIN) 15 mg/kg = 1,600 mg in sodium chloride 0.9% 100 mL chemo infusion, 15 mg/kg = 1,600 mg (100 % of original dose 15 mg/kg), Intravenous, Clinic/HOD 1 time, 6 of 6 cycles  Dose modification: 15 mg/kg (original dose 15 mg/kg, Cycle 1)  Administration: 1,600 mg (2/28/2019), 1,600 mg (3/21/2019), 1,600 mg (4/11/2019), 1,600 mg (5/7/2019), 1,600 mg (5/28/2019), 1,510 mg (6/18/2019)    CARBOplatin (PARAPLATIN) 870 mg in sodium chloride 0.9% 337 mL chemo infusion, 870 mg (100 % of original dose 868.8 mg), Intravenous, Clinic/HOD 1 time, 6 of 6 cycles  Dose modification:   (original dose 868.8 mg, Cycle 1)  Administration: 870 mg (2/28/2019), 870 mg (3/21/2019), 900 mg (4/11/2019), 870 mg (5/7/2019), 660 mg (5/28/2019), 690 mg (6/18/2019)    PACLitaxel (TAXOL) 175 mg/m2 = 396 mg in sodium chloride 0.9% 566 mL chemo infusion, 175 mg/m2 = 396 mg, Intravenous, Clinic/HOD 1 time, 6 of 6 cycles  Dose modification: 140 mg/m2 (80 % of original dose 175 mg/m2, Cycle 5)  Administration: 396 mg (2/28/2019), 396 mg (3/21/2019), 396 mg (4/11/2019), 396 mg (5/7/2019), 318 mg (5/28/2019), 306 mg (6/18/2019)    Plan Name: OP BEVACIZUMAB Q3W   Treatment Goal: Maintenance  Status: Inactive  Start Date: 10/9/2019  End Date: 9/24/2020  Provider: Oscar Mckeon MD  Chemotherapy: bevacizumab (AVASTIN) 15 mg/kg = 1,615 mg in sodium chloride 0.9% 100 mL chemo infusion, 15 mg/kg = 1,615 mg, Intravenous, Clinic/HOD 1 time, 11 of 17 cycles  Administration: 1,615 mg (10/9/2019), 1,615 mg (10/29/2019), 1,615 mg (12/10/2019), 1,615 mg (1/21/2020), 1,600 mg  (4/2/2020), 1,615 mg (4/23/2020), 1,600 mg (7/1/2020), 1,600 mg (7/22/2020), 1,600 mg (8/13/2020), 1,795 mg (9/3/2020), 1,795 mg (9/24/2020)    Plan Name: OP GYN PACLITAXEL CARBOPLATIN desensitization (AUC 5) Q3W  Treatment Goal: Control  Status: Inactive  Start Date: 9/29/2023  End Date: 2/14/2024  Provider: Ismael Juarez MD  Chemotherapy: CARBOplatin (PARAPLATIN) 650 mg in sodium chloride 0.9% 335 mL chemo infusion, 650 mg (100 % of original dose 648 mg), Intravenous, Clinic/HOD 1 time, 6 of 17 cycles  Dose modification:   (original dose 648 mg, Cycle 1),   (original dose 853.8 mg, Cycle 2),   (original dose 730 mg, Cycle 3),   (original dose 730 mg, Cycle 4), 5 mg (original dose 730 mg, Cycle 4), 7.3 mg (original dose 730 mg, Cycle 4), 5 mg (original dose 730 mg, Cycle 4, Reason: MD Discretion, Comment: desensitization), 73 mg (original dose 730 mg, Cycle 4), 720 mg (original dose 730 mg, Cycle 4, Reason: MD Discretion, Comment: desensitization), 648.97 mg (original dose 730 mg, Cycle 4, Reason: MD Discretion, Comment: desensitization), 0.61 mg (original dose 730 mg, Cycle 5, Reason: MD Discretion, Comment: desensitization), 5 mg (original dose 730 mg, Cycle 5, Reason: MD Discretion, Comment: desens), 60.5 mg (original dose 730 mg, Cycle 5, Reason: MD Discretion, Comment: desensitization),   (original dose 730 mg, Cycle 5, Reason: MD Discretion, Comment: new scr), 6.05 mg (original dose 730 mg, Cycle 5, Reason: Dose not tolerated, Comment: desensitization)  Administration: 650 mg (9/29/2023), 730 mg (11/10/2023), 710 mg (10/20/2023), 5 mg (1/3/2024), 5 mg (1/3/2024), 75 mg (1/3/2024), 650 mg (1/3/2024), 5 mg (2/14/2024), 5 mg (2/14/2024), 75 mg (2/14/2024), 650 mg (2/14/2024), 5 mg (1/24/2024), 60 mg (1/24/2024), 605 mg (1/24/2024), 5 mg (1/24/2024)    PACLitaxeL (TAXOL) 175 mg/m2 = 402 mg in sodium chloride 0.9% 500 mL chemo infusion, 175 mg/m2 = 402 mg, Intravenous, Clinic/[facility] 1 time, 6 of 17  cycles  Administration: 402 mg (9/29/2023), 402 mg (10/20/2023), 408 mg (11/10/2023), 408 mg (1/3/2024), 408 mg (1/24/2024), 408 mg (2/14/2024)    Plan Name: OP GYN bevacizumab liposomal DOXOrubicin Q4W  Treatment Goal: Control  Status: Active  Start Date: 9/3/2024 (Planned)  End Date: 7/22/2025 (Planned)  Provider: Torri House MD  Chemotherapy: DOXOrubicin liposome (DOXIL) 92 mg in D5W 296 mL chemo infusion, 40 mg/m2 = 92 mg, Intravenous, [facility] 1 time, 0 of 12 cycles    bevacizumab-awwb (MVASI) 10 mg/kg = 1,125 mg in 0.9% NaCl 100 mL infusion, 10 mg/kg = 1,125 mg, Intravenous, Clinic/[facility] 1 time, 0 of 12 cycles       Ovarian cancer, bilateral   8/15/2019 Initial Diagnosis    Ovarian cancer, bilateral     10/9/2019 - 9/24/2020 Chemotherapy    Treatment Summary   Plan Name: OP BEVACIZUMAB Q3W   Treatment Goal: Maintenance  Status: Inactive  Start Date: 10/9/2019  End Date: 9/24/2020  Provider: Oscar Mckeon MD  Chemotherapy: dexAMETHasone tablet 8 mg, 8 mg (100 % of original dose 8 mg), Oral, Clinic/HOD 1 time, 2 of 8 cycles  Dose modification: 8 mg (original dose 8 mg, Cycle 8), 8 mg (original dose 8 mg, Cycle 12)  Administration: 8 mg (7/22/2020), 8 mg (8/13/2020)  bevacizumab (AVASTIN) 15 mg/kg = 1,615 mg in sodium chloride 0.9% 100 mL chemo infusion, 15 mg/kg = 1,615 mg, Intravenous, Clinic/HOD 1 time, 11 of 17 cycles  Administration: 1,615 mg (10/9/2019), 1,615 mg (10/29/2019), 1,615 mg (12/10/2019), 1,615 mg (1/21/2020), 1,600 mg (4/2/2020), 1,615 mg (4/23/2020), 1,600 mg (7/1/2020), 1,600 mg (7/22/2020), 1,600 mg (8/13/2020), 1,795 mg (9/3/2020), 1,795 mg (9/24/2020)     9/29/2023 - 2/14/2024 Chemotherapy    Treatment Summary   Plan Name: OP GYN PACLITAXEL CARBOPLATIN desensitization (AUC 5) Q3W  Treatment Goal: Control  Status: Inactive  Start Date: 9/29/2023  End Date: 2/14/2024  Provider: Ismael Juarez MD  Chemotherapy: CARBOplatin (PARAPLATIN) 650 mg in sodium chloride 0.9% 335 mL chemo  infusion, 650 mg (100 % of original dose 648 mg), Intravenous, Clinic/HOD 1 time, 6 of 17 cycles  Dose modification:   (original dose 648 mg, Cycle 1),   (original dose 853.8 mg, Cycle 2),   (original dose 730 mg, Cycle 3),   (original dose 730 mg, Cycle 4), 5 mg (original dose 730 mg, Cycle 4), 7.3 mg (original dose 730 mg, Cycle 4), 5 mg (original dose 730 mg, Cycle 4, Reason: MD Discretion, Comment: desensitization), 73 mg (original dose 730 mg, Cycle 4), 720 mg (original dose 730 mg, Cycle 4, Reason: MD Discretion, Comment: desensitization), 648.97 mg (original dose 730 mg, Cycle 4, Reason: MD Discretion, Comment: desensitization), 0.61 mg (original dose 730 mg, Cycle 5, Reason: MD Discretion, Comment: desensitization), 5 mg (original dose 730 mg, Cycle 5, Reason: MD Discretion, Comment: desens), 60.5 mg (original dose 730 mg, Cycle 5, Reason: MD Discretion, Comment: desensitization),   (original dose 730 mg, Cycle 5, Reason: MD Discretion, Comment: new scr), 6.05 mg (original dose 730 mg, Cycle 5, Reason: Dose not tolerated, Comment: desensitization)  Administration: 650 mg (9/29/2023), 730 mg (11/10/2023), 710 mg (10/20/2023), 5 mg (1/3/2024), 5 mg (1/3/2024), 75 mg (1/3/2024), 650 mg (1/3/2024), 5 mg (2/14/2024), 5 mg (2/14/2024), 75 mg (2/14/2024), 650 mg (2/14/2024), 5 mg (1/24/2024), 60 mg (1/24/2024), 605 mg (1/24/2024), 5 mg (1/24/2024)  PACLitaxeL (TAXOL) 175 mg/m2 = 402 mg in sodium chloride 0.9% 500 mL chemo infusion, 175 mg/m2 = 402 mg, Intravenous, Clinic/HOD 1 time, 6 of 17 cycles  Administration: 402 mg (9/29/2023), 402 mg (10/20/2023), 408 mg (11/10/2023), 408 mg (1/3/2024), 408 mg (1/24/2024), 408 mg (2/14/2024)     9/5/2024 -  Chemotherapy    Treatment Summary   Plan Name: OP GYN bevacizumab liposomal DOXOrubicin Q4W  Treatment Goal: Control  Status: Active  Start Date: 9/5/2024 (Planned)  End Date: 7/24/2025 (Planned)  Provider: Torri House MD  Chemotherapy: DOXOrubicin liposome (DOXIL)  92 mg in D5W 296 mL chemo infusion, 40 mg/m2 = 92 mg, Intravenous, Clinic/HOD 1 time, 0 of 12 cycles  bevacizumab-awwb (MVASI) 10 mg/kg = 1,125 mg in 0.9% NaCl 100 mL infusion, 10 mg/kg = 1,125 mg, Intravenous, Clinic/HOD 1 time, 0 of 12 cycles      Chemotherapy    Treatment Summary   Plan Name: OP GYN PACLITAXEL CARBOPLATIN (AUC 6) Q3W  Treatment Goal: Palliative  Status: Inactive  Start Date: 2/28/2019  End Date: 6/18/2019  Provider: Will Trevizo Jr., MD  Chemotherapy: bevacizumab (AVASTIN) 15 mg/kg = 1,600 mg in sodium chloride 0.9% 100 mL chemo infusion, 15 mg/kg = 1,600 mg (100 % of original dose 15 mg/kg), Intravenous, Clinic/HOD 1 time, 6 of 6 cycles  Dose modification: 15 mg/kg (original dose 15 mg/kg, Cycle 1)  Administration: 1,600 mg (2/28/2019), 1,600 mg (3/21/2019), 1,600 mg (4/11/2019), 1,600 mg (5/7/2019), 1,600 mg (5/28/2019), 1,510 mg (6/18/2019)    CARBOplatin (PARAPLATIN) 870 mg in sodium chloride 0.9% 337 mL chemo infusion, 870 mg (100 % of original dose 868.8 mg), Intravenous, Clinic/HOD 1 time, 6 of 6 cycles  Dose modification:   (original dose 868.8 mg, Cycle 1)  Administration: 870 mg (2/28/2019), 870 mg (3/21/2019), 900 mg (4/11/2019), 870 mg (5/7/2019), 660 mg (5/28/2019), 690 mg (6/18/2019)    PACLitaxel (TAXOL) 175 mg/m2 = 396 mg in sodium chloride 0.9% 566 mL chemo infusion, 175 mg/m2 = 396 mg, Intravenous, Clinic/HOD 1 time, 6 of 6 cycles  Dose modification: 140 mg/m2 (80 % of original dose 175 mg/m2, Cycle 5)  Administration: 396 mg (2/28/2019), 396 mg (3/21/2019), 396 mg (4/11/2019), 396 mg (5/7/2019), 318 mg (5/28/2019), 306 mg (6/18/2019)    Plan Name: OP BEVACIZUMAB Q3W   Treatment Goal: Maintenance  Status: Inactive  Start Date: 10/9/2019  End Date: 9/24/2020  Provider: Oscar Mckeon MD  Chemotherapy: bevacizumab (AVASTIN) 15 mg/kg = 1,615 mg in sodium chloride 0.9% 100 mL chemo infusion, 15 mg/kg = 1,615 mg, Intravenous, Clinic/HOD 1 time, 11 of 17 cycles  Administration:  1,615 mg (10/9/2019), 1,615 mg (10/29/2019), 1,615 mg (12/10/2019), 1,615 mg (1/21/2020), 1,600 mg (4/2/2020), 1,615 mg (4/23/2020), 1,600 mg (7/1/2020), 1,600 mg (7/22/2020), 1,600 mg (8/13/2020), 1,795 mg (9/3/2020), 1,795 mg (9/24/2020)    Plan Name: OP GYN PACLITAXEL CARBOPLATIN desensitization (AUC 5) Q3W  Treatment Goal: Control  Status: Inactive  Start Date: 9/29/2023  End Date: 2/14/2024  Provider: Ismael Juarez MD  Chemotherapy: CARBOplatin (PARAPLATIN) 650 mg in sodium chloride 0.9% 335 mL chemo infusion, 650 mg (100 % of original dose 648 mg), Intravenous, Clinic/HOD 1 time, 6 of 17 cycles  Dose modification:   (original dose 648 mg, Cycle 1),   (original dose 853.8 mg, Cycle 2),   (original dose 730 mg, Cycle 3),   (original dose 730 mg, Cycle 4), 5 mg (original dose 730 mg, Cycle 4), 7.3 mg (original dose 730 mg, Cycle 4), 5 mg (original dose 730 mg, Cycle 4, Reason: MD Discretion, Comment: desensitization), 73 mg (original dose 730 mg, Cycle 4), 720 mg (original dose 730 mg, Cycle 4, Reason: MD Discretion, Comment: desensitization), 648.97 mg (original dose 730 mg, Cycle 4, Reason: MD Discretion, Comment: desensitization), 0.61 mg (original dose 730 mg, Cycle 5, Reason: MD Discretion, Comment: desensitization), 5 mg (original dose 730 mg, Cycle 5, Reason: MD Discretion, Comment: desens), 60.5 mg (original dose 730 mg, Cycle 5, Reason: MD Discretion, Comment: desensitization),   (original dose 730 mg, Cycle 5, Reason: MD Discretion, Comment: new scr), 6.05 mg (original dose 730 mg, Cycle 5, Reason: Dose not tolerated, Comment: desensitization)  Administration: 650 mg (9/29/2023), 730 mg (11/10/2023), 710 mg (10/20/2023), 5 mg (1/3/2024), 5 mg (1/3/2024), 75 mg (1/3/2024), 650 mg (1/3/2024), 5 mg (2/14/2024), 5 mg (2/14/2024), 75 mg (2/14/2024), 650 mg (2/14/2024), 5 mg (1/24/2024), 60 mg (1/24/2024), 605 mg (1/24/2024), 5 mg (1/24/2024)    PACLitaxeL (TAXOL) 175 mg/m2 = 402 mg in sodium chloride 0.9%  500 mL chemo infusion, 175 mg/m2 = 402 mg, Intravenous, Clinic/HOD 1 time, 6 of 17 cycles  Administration: 402 mg (2023), 402 mg (10/20/2023), 408 mg (11/10/2023), 408 mg (1/3/2024), 408 mg (2024), 408 mg (2024)    Plan Name: OP GYN bevacizumab liposomal DOXOrubicin Q4W  Treatment Goal: Control  Status: Active  Start Date: 9/3/2024 (Planned)  End Date: 2025 (Planned)  Provider: Torri House MD  Chemotherapy: DOXOrubicin liposome (DOXIL) 92 mg in D5W 296 mL chemo infusion, 40 mg/m2 = 92 mg, Intravenous, Clinic/HOD 1 time, 0 of 12 cycles    bevacizumab-awwb (MVASI) 10 mg/kg = 1,125 mg in 0.9% NaCl 100 mL infusion, 10 mg/kg = 1,125 mg, Intravenous, Clinic/HOD 1 time, 0 of 12 cycles             Past Medical History:   Diagnosis Date    Anemia     Anxiety     Arthritis     knees    Cancer     ovarian    CHF (congestive heart failure)     Hx antineoplastic chemotherapy     last 2019    Hyperlipemia     Hypertension     Neck pain     Ovarian cancer 2019    CHEMO    Peritoneal carcinomatosis 2019       Past Surgical History:   Procedure Laterality Date    breast reduction  10/02/2018    CATARACT EXTRACTION Bilateral     2023     SECTION      X 1    COLONOSCOPY N/A 2021    Procedure: COLONOSCOPY;  Surgeon: Paulette Rojas MD;  Location: [facility];  Service: Gastroenterology;  Laterality: N/A;    CYSTOSCOPY W/ URETERAL STENT PLACEMENT Right 2019    Procedure: CYSTOSCOPY, WITH URETERAL STENT INSERTION;  Surgeon: Timmy Santiago IV, MD;  Location: [facility] OR;  Service: Urology;  Laterality: Right;    CYSTOSCOPY W/ URETERAL STENT REMOVAL  10/04/2019    DILATION AND CURETTAGE OF UTERUS      HYSTERECTOMY      RALH for fibroids (still has ovaries)    INSERTION OF VENOUS ACCESS PORT Left 2019    Procedure: INSERTION, VENOUS ACCESS PORT;  Surgeon: Ulisses Monzon MD;  Location: [facility];  Service: General;  Laterality: Left;  Left internal jugular     LYSIS OF  ADHESIONS OF URETER N/A 08/15/2019    Procedure: URETEROLYSIS;  Surgeon: Ismael Juarez MD;  Location[facility] OR;  Service: OB/GYN;  Laterality: N/A;    OMENTECTOMY N/A 08/15/2019    Procedure: OMENTECTOMY;  Surgeon: Ismael Juarez MD;  Location: [facility] OR;  Service: OB/GYN;  Laterality: N/A;    OOPHORECTOMY      RETROGRADE PYELOGRAPHY Right 2019    Procedure: PYELOGRAM, RETROGRADE;  Surgeon: Timmy Santiago IV, MD;  Location: [facility] OR;  Service: Urology;  Laterality: Right;    ROBOT-ASSISTED LAPAROSCOPIC SALPINGO-OOPHORECTOMY USING DA TIA XI Bilateral 08/15/2019    Procedure: XI ROBOTIC SALPINGO-OOPHORECTOMY;  Surgeon: Ismael Juarez MD;  Location: [facility] OR;  Service: OB/GYN;  Laterality: Bilateral;    TOTAL REDUCTION MAMMOPLASTY  2018    TUBAL LIGATION         Family History   Problem Relation Name Age of Onset    Glaucoma Mother      No Known Problems Father      Colon cancer Brother      Breast cancer Maternal Aunt      Breast cancer Paternal Aunt      Ovarian cancer Paternal Aunt      Anesthesia problems Other cousin     Thrombophilia Neg Hx         Review of patient's allergies indicates:  No Known Allergies    Social History     Tobacco Use    Smoking status: Former     Current packs/day: 0.00     Average packs/day: 1 pack/day for 25.0 years (25.0 ttl pk-yrs)     Types: Cigarettes     Start date: 10/1/1993     Quit date: 10/1/2018     Years since quittin.9    Smokeless tobacco: Never    Tobacco comments:     States started quit 2 months ago after 30 years   Substance Use Topics    Alcohol use: Yes     Comment: occasionally  No alcohol 72h prior to sx    Drug use: No     Labs   Labs:  No visits with results within 2 Day(s) from this visit.   Latest known visit with results is:   Hospital Outpatient Visit on 2024   Component Date Value Ref Range Status    BSA 2024 2.29  m2 Final    [employer] Bi[employer] MOD Ejection Fra* 2024 56  % Final    GLS 2024 19.5  % Final    LVOT stroke  volume 09/03/2024 110.31  cm3 Final    LVIDd 09/03/2024 4.41  3.5 - 6.0 cm Final    LV Systolic Volume 09/03/2024 16.87  mL Final    LV Systolic Volume Index 09/03/2024 7.7  mL/m2 Final    LVIDs 09/03/2024 2.24  2.1 - 4.0 cm Final    LV ESV A2C 09/03/2024 41.70  mL Final    LV Diastolic Volume 09/03/2024 88.24  mL Final    LV ESV A4C 09/03/2024 64.99  mL Final    LV Diastolic Volume Index 09/03/2024 40.11  mL/m2 Final    Left Ventricular End Systolic Volu* 09/03/2024 16.87  mL Final    Left Ventricular End Diastolic Vol* 09/03/2024 88.24  mL Final    IVS 09/03/2024 1.18 (A)  0.6 - 1.1 cm Final    LVOT diameter 09/03/2024 2.15  cm Final    LVOT area 09/03/2024 3.6  cm2 Final    FS 09/03/2024 49 (A)  28 - 44 % Final    Left Ventricle Relative Wall Thick* 09/03/2024 0.43  cm Final    Posterior Wall 09/03/2024 0.94  0.6 - 1.1 cm Final    LV mass 09/03/2024 160.91  g Final    LV Mass Index 09/03/2024 73  g/m2 Final    MV Peak E Froylan 09/03/2024 0.85  m/s Final    TDI LATERAL 09/03/2024 0.11  m/s Final    TDI SEPTAL 09/03/2024 0.07  m/s Final    E/E' ratio 09/03/2024 9.44  m/s Final    MV Peak A Froylan 09/03/2024 0.80  m/s Final    E/A ratio 09/03/2024 1.06   Final    IVRT 09/03/2024 88.49  msec Final    E wave deceleration time 09/03/2024 227.43  msec Final    LV SEPTAL E/E' RATIO 09/03/2024 12.14  m/s Final    LV LATERAL E/E' RATIO 09/03/2024 7.73  m/s Final    LVOT peak froylan 09/03/2024 1.40  m/s Final    Left Ventricular Outflow Tract Mary* 09/03/2024 0.94  cm/s Final    Left Ventricular Outflow Tract Mary* 09/03/2024 4.01  mmHg Final    RVOT peak VTI 09/03/2024 18.5  cm Final    RV/LV Ratio 09/03/2024 0.97  cm Final    LA size 09/03/2024 3.24  cm Final    Left Atrium Minor Axis 09/03/2024 5.49  cm Final    Left Atrium Major Axis 09/03/2024 5.88  cm Final    LA volume (mod) 09/03/2024 52.53  cm3 Final    LA Volume Index (Mod) 09/03/2024 23.9  mL/m2 Final    RA Major Axis 09/03/2024 4.53  cm Final    RA Width 09/03/2024 3.79   cm Final    AV mean gradient 09/03/2024 5  mmHg Final    AV peak gradient 09/03/2024 10  mmHg Final    Ao peak nathalia 09/03/2024 1.59  m/s Final    Ao VTI 09/03/2024 32.60  cm Final    LVOT peak VTI 09/03/2024 30.40  cm Final    AV valve area 09/03/2024 3.38  cm² Final    AV Velocity Ratio 09/03/2024 0.88   Final    AV index (prosthetic) 09/03/2024 0.93   Final    COREY by Velocity Ratio 09/03/2024 3.20  cm² Final    MV stenosis pressure 1/2 time 09/03/2024 65.95  ms Final    MV valve area p 1/2 method 09/03/2024 3.34  cm2 Final    PV mean gradient 09/03/2024 2  mmHg Final    PV PEAK VELOCITY 09/03/2024 1.06  m/s Final    PV peak gradient 09/03/2024 3  mmHg Final    Pulmonary Valve Mean Velocity 09/03/2024 0.70  m/s Final    RVOT peak nathalia 09/03/2024 0.87  m/s Final    Sinus 09/03/2024 3.01  cm Final    STJ 09/03/2024 3.04  cm Final    Ascending aorta 09/03/2024 3.06  cm Final    IVC diameter 09/03/2024 1.42  cm Final    Mean e' 09/03/2024 0.09  m/s Final    ZLVIDS 09/03/2024 -5.63   Final    ZLVIDD 09/03/2024 -5.33   Final    LA area A4C 09/03/2024 21.95  cm2 Final    LA area A2C 09/03/2024 17.33  cm2 Final    RVDD 09/03/2024 4.28  cm Final    LA Volume Index 09/03/2024 29.1  mL/m2 Final    LA volume 09/03/2024 64.12  cm3 Final    LA WIDTH 09/03/2024 4.1  cm Final    Est. RA pres 09/03/2024 3  mmHg Final        Imaging   CT Chest Abdomen Pelvis With IV Contrast (XPD) NO Oral Contrast 12/12/2023  Details    Reading Physician Reading Date Result Priority   Michele Kim MD  [phone] 12/12/2023 STAT     Narrative & Impression  EXAMINATION:  CT CHEST ABDOMEN PELVIS WITH IV CONTRAST (XPD)     CLINICAL HISTORY:  Hypokalemia,follow up ovarian cancer on treatment; evaluate treatment response for subsequent treatment planning.     TECHNIQUE:  Axial CT imaging was performed through the chest, abdomen and pelvis with  100cc  of intravenous contrast. Multiplanar reformats were performed and interpreted.      COMPARISON:  08/21/2023, 09/29/2022 and 09/24/2020     FINDINGS:  Chest:     The heart, great vessels, and mediastinal structures are within normal limits. No thoracic adenopathy.  Left-sided MediPort in the SVC.  Aortic atherosclerosis.     The lungs are clear bilaterally. No pleural effusions.     Abdomen:     The liver, spleen, kidneys, adrenal glands are within normal limits.  Stable 10 mm uncinate process pancreatic cyst.  No biliary ductal dilatation.     The gallbladder is unremarkable.     No free fluid, free air, or inflammatory change.     The small bowel is within normal limits.  Colonic diverticulosis.  Supraumbilical abdominal wall hernia containing a nondistended segment of the transverse colon.  No evidence of strangulation or incarceration.  The appendix is normal.     Aortic atherosclerosis without evidence of aneurysm.     Pelvis:     The urinary bladder is unremarkable. The uterus has been removed.  No free pelvic fluid or adenopathy.     No significant osseous abnormality is identified.  No suspicious osseous lesions.     Impression:     No CT evidence of recurrent or metastatic disease in the chest, abdomen or pelvis.     All CT scans at this facility are performed  using dose modulation techniques as appropriate to performed exam including the following:  automated exposure control; adjustment of mA and/or kV according to the patients size (this includes techniques or standardized protocols for targeted exams where dose is matched to indication/reason for exam: i.e. extremities or head);  iterative reconstruction technique.        NM PET CT FDG SKULL BASE TO MID THIGH - 02/26/2024  FINDINGS:  Head/neck: There is normal physiologic uptake noted within the visualized brain parenchyma. No FDG avid adenopathy within the neck.     Chest: No FDG avid pulmonary nodules/masses. No FDG avid mediastinal, hilar or axillary lymph nodes.A left-sided MediPort catheter is noted.     Abdomen/Pelvis: Normal  physiologic uptake noted within the liver, spleen, urinary tract, and bowel.The adrenals are unremarkable.  There are a couple of left periaortic retroperitoneal lymph nodes that demonstrate an SUV max of up to 2.8 and 3.1.  Findings are nonspecific.  There is diverticulosis seen scattered throughout the colon.  The midportion of the transverse colon is contained within a midline ventral hernia.  No evidence for bowel obstruction..     Skeletal: No FDG avid osseus lesions identified.     Impression:  1. Low level nonspecific uptake associated with a couple of left periaortic retroperitoneal lymph nodes.  It is possible these lymph nodes may just be reactive in nature.  Continued follow-up recommended.      NM PET CT FDG SKULL BASE TO MID THIGH - 04/08/2024  FINDINGS:  Quality of the study: Adequate.     SUV max of the liver parenchyma is 3.7     Neck: No abnormal FDG avidity.  No lymphadenopathy or mass.     Chest: No FDG avid mediastinal, hilar, or axillary lymphadenopathy.  No pleural effusion.  No pulmonary nodule.     Abdomen/pelvis: Interval resolution of the low level FDG avidity of the left para-aortic lymph nodes.  Lymph nodes are also smaller compared to the prior exam and normal in size on this exam.  No abnormal FDG avidity.  No ascites.  No lymphadenopathy     Skeletal structures: No abnormal FDG avidity.  No focal lytic or sclerotic lesion in the osseous structures.     Physiologic uptake of the tracer is present within the brain, salivary glands, myocardium, GI and  tracts.     Incidental CT findings: No interval change in the umbilical hernia containing a loop of colon.  Left IJ MediPort with its tip in the SVC.    Impression:  No FDG PET/CT findings to suggest recurrent or metastatic disease.  Interval resolution of the low level FDG avidity seen in the left para-aortic retroperitoneal lymph nodes on the prior exam.      NM PET CT FDG SKULL BASE TO MID THIGH - 07/02/2024  CLINICAL  HISTORY:  surveillance; Drug-induced polyneuropathy     TECHNIQUE:  13.71 mCi of F18-FDG was administered intravenously.  After an approximately 60 min distribution time, PET/CT images were acquired from the skull base to the mid thigh. Transmission images were acquired to correct for attenuation using a whole body low-dose CT scan without contrast.     COMPARISON:  Prior PET CTs, most recent 04/08/2024; CT abdomen/pelvis 05/29/2024     FINDINGS:  Head/neck: New right supraclavicular/neck base FDG avid lymph nodes, SUV max 11.  Otherwise normal physiologic activity.     Chest: New enlarged left subpectoral FDG avid lymph node, SUV max 11.  No FDG avid right axillary adenopathy.  New mediastinal FDG avid adenopathy (paratracheal, prevascular subcarinal).  SUV max 15 (pre-vascular).  New left internal mammary FDG avid lymph node, SUV max 6.5.  New anterior pericardial FDG avid nodule, axial fused image 175.  New left paraspinal FDG avid nodule, axial fused image 176.     Trace bilateral pleural effusions.  No FDG avid pulmonary nodule appreciated.     Abdomen/pelvis: Small FDG avid left upper quadrant omental nodularity/thickening, SUV max 3.5 (axial fused image 182 through 210).  Left pericolic gutter FDG avid soft tissue nodule, SUV max 9 (axial fused image 273).  Suspected small FDG avid nodules/lymph nodes closely associated with small bowel loops within the central upper pelvis, axial fused image 301 and 316.  FDG avid right iliac and  lymph nodes (axial fused image 324 and 331), SUV max 6.4.     Trace nonspecific pelvic free fluid.  Colonic diverticulosis.  Bowel containing anterior ventral hernia as noted on prior CT.     Skeletal/subcutaneous structures: No suspicious FDG avid osseous abnormality.     Impression:  Disease progression with neck base/chest, abdomen and pelvis FDG avid disease now present as above.     Electronically signed by:Neil Mejia MD  Date:                                             07/02/2024     Assessment and Plan   Recurrent, Stage IV Serous Ovarian Cancer with Peritoneal Carcinomatosis - 01/2019   Treated with neoadjuvant chemotherapy with carboplatin paclitaxel and Avastin (02/2019 - 07/2019), followed by surgical resection in August 2019.  Patient was then treated with Avastin maintenance  08/21/2023 CT CAP: Marked interval disease progression with severe peritoneal carcinomatosis as detailed above.  12/12/2023 CT CAP: No CT evidence of recurrent or metastatic disease in the chest, abdomen or pelvis.  : 7 >> 21 >> 32 >> 64 >> 11  Started Carbo/Taxol 09/29/2023  C1 no complications  C2 and C3 (11/10/23): reaction consisting of feeling presyncopal, SOB, sweating; vitals significant for hypoxia and hypotension; evaluated by AI and recs for desensitization  C4 -C6with desensitization  Per medical record, patient declined treatment until after the new year  CT CAP 12/12/23 showed CLYDE  PET-CT 02/26/24: Low level nonspecific uptake associated with a couple of left periaortic retroperitoneal lymph nodes.  It is possible these lymph nodes may just be reactive in nature  PET-CT 04/08/2024:  No FDG PET/CT findings to suggest recurrent or metastatic disease. Interval resolution of the low level FDG avidity seen in the left para-aortic retroperitoneal lymph nodes on the prior exam.   PET-CT 07/02/24: Disease progression with neck base/chest, abdomen and pelvis FDG avid disease now present as above.   Presented in Gyn/Onc tumor board 07/31/24 with consensus for FRa testing and proceed with mirvetuximab if positive and if negative proceed with Doxil  Plan  Tumor NGS/Blood NGS/FRa testing negative  Will proceed with Doxil and Avastin  Labs reviewed and patient has signs of dehydration so will administer 1L NS with chemotherapy tomorrow  Will plan for repeat imaging in 6-8 weeks      Chronic Medical Conditions  Osteoarthritis  HTN  Anxiety        Med Onc Chart Routing      Follow up with  physician 2 weeks.   Follow up with [facility]    Infusion scheduling note   C1 Doxorubicin and Bevacizumab and 1L NS tomorrow; Bevacizumab in 2 weeks   Injection scheduling note    Labs CBC, CMP, phosphorus, magnesium and urinalysis   Scheduling:  Preferred lab:  Lab interval:     Imaging    Pharmacy appointment    Other referrals                The patient was seen, interviewed and examined. Pertinent lab and radiologic studies were reviewed. Pt instructed to call should they develop concerning signs/symptoms or have further questions.        Portions of the record may have been created with voice recognition software. Occasional wrong-word or sound-a-[id number]z substitutions may have occurred due to the inherent limitations of voice recognition software. Read the chart carefully and recognize, using context, where substitutions have occurred.      Torri Pugh MD    Hematology/Oncology

## 2024-09-06 ENCOUNTER — INFUSION (OUTPATIENT)
Dept: INFUSION THERAPY | Facility: HOSPITAL | Age: 69
End: 2024-09-06
Attending: INTERNAL MEDICINE
Payer: MEDICARE

## 2024-09-06 ENCOUNTER — SOCIAL WORK (OUTPATIENT)
Dept: HEMATOLOGY/ONCOLOGY | Facility: CLINIC | Age: 69
End: 2024-09-06
Payer: MEDICARE

## 2024-09-06 VITALS
TEMPERATURE: 98 F | SYSTOLIC BLOOD PRESSURE: 110 MMHG | HEART RATE: 70 BPM | HEIGHT: 67 IN | WEIGHT: 239.31 LBS | BODY MASS INDEX: 37.56 KG/M2 | DIASTOLIC BLOOD PRESSURE: 55 MMHG | RESPIRATION RATE: 18 BRPM | OXYGEN SATURATION: 94 %

## 2024-09-06 DIAGNOSIS — C56.3 OVARIAN CANCER, BILATERAL: Primary | ICD-10-CM

## 2024-09-06 DIAGNOSIS — C78.6 PERITONEAL CARCINOMATOSIS: ICD-10-CM

## 2024-09-06 PROCEDURE — 25000003 PHARM REV CODE 250: Mod: HCNC | Performed by: INTERNAL MEDICINE

## 2024-09-06 PROCEDURE — 96376 TX/PRO/DX INJ SAME DRUG ADON: CPT | Mod: HCNC

## 2024-09-06 PROCEDURE — 63600175 PHARM REV CODE 636 W HCPCS: Mod: HCNC | Performed by: INTERNAL MEDICINE

## 2024-09-06 PROCEDURE — 96415 CHEMO IV INFUSION ADDL HR: CPT | Mod: HCNC

## 2024-09-06 PROCEDURE — 96417 CHEMO IV INFUS EACH ADDL SEQ: CPT | Mod: HCNC

## 2024-09-06 PROCEDURE — 96375 TX/PRO/DX INJ NEW DRUG ADDON: CPT | Mod: HCNC

## 2024-09-06 RX ORDER — SODIUM CHLORIDE 0.9 % (FLUSH) 0.9 %
10 SYRINGE (ML) INJECTION
OUTPATIENT
Start: 2024-09-06

## 2024-09-06 RX ORDER — PROCHLORPERAZINE MALEATE 5 MG
5 TABLET ORAL EVERY 6 HOURS PRN
Qty: 20 TABLET | Refills: 11 | Status: SHIPPED | OUTPATIENT
Start: 2024-09-06

## 2024-09-06 RX ORDER — HEPARIN 100 UNIT/ML
500 SYRINGE INTRAVENOUS
Status: DISCONTINUED | OUTPATIENT
Start: 2024-09-06 | End: 2024-09-06 | Stop reason: HOSPADM

## 2024-09-06 RX ORDER — HEPARIN 100 UNIT/ML
500 SYRINGE INTRAVENOUS
OUTPATIENT
Start: 2024-09-06

## 2024-09-06 RX ORDER — ONDANSETRON HYDROCHLORIDE 2 MG/ML
8 INJECTION, SOLUTION INTRAVENOUS
Status: COMPLETED | OUTPATIENT
Start: 2024-09-06 | End: 2024-09-06

## 2024-09-06 RX ADMIN — DOXORUBICIN HYDROCHLORIDE 92 MG: 2 INJECTABLE, LIPOSOMAL INTRAVENOUS at 01:09

## 2024-09-06 RX ADMIN — ALTEPLASE 2 MG: 2.2 INJECTION, POWDER, LYOPHILIZED, FOR SOLUTION INTRAVENOUS at 11:09

## 2024-09-06 RX ADMIN — ONDANSETRON 8 MG: 2 INJECTION, SOLUTION INTRAMUSCULAR; INTRAVENOUS at 12:09

## 2024-09-06 RX ADMIN — SODIUM CHLORIDE 1100 MG: 9 INJECTION, SOLUTION INTRAVENOUS at 12:09

## 2024-09-06 RX ADMIN — ALTEPLASE 2 MG: 2.2 INJECTION, POWDER, LYOPHILIZED, FOR SOLUTION INTRAVENOUS at 10:09

## 2024-09-06 RX ADMIN — SODIUM CHLORIDE 1000 ML: 9 INJECTION, SOLUTION INTRAVENOUS at 10:09

## 2024-09-06 RX ADMIN — DEXTROSE MONOHYDRATE: 50 INJECTION, SOLUTION INTRAVENOUS at 12:09

## 2024-09-06 RX ADMIN — HEPARIN 500 UNITS: 100 SYRINGE at 02:09

## 2024-09-06 NOTE — PLAN OF CARE
Problem: Adult Inpatient Plan of Care  Goal: Plan of Care Review  Outcome: Progressing  Flowsheets (Taken 9/6/2024 1313)  Plan of Care Reviewed With: patient  Goal: Patient-Specific Goal (Individualized)  Outcome: Progressing  Flowsheets (Taken 9/6/2024 1313)  Individualized Care Needs: warm blanket, reclining position, pillow  Anxieties, Fears or Concerns: none verbalized     Problem: Infection  Goal: Absence of Infection Signs and Symptoms  Outcome: Progressing  Intervention: Prevent or Manage Infection  Flowsheets (Taken 9/6/2024 1313)  Infection Management: aseptic technique maintained

## 2024-09-06 NOTE — PROGRESS NOTES
"Swer was notified of new pt tx start by nurse navigation on 8/29/24. Swer met with pt during today's infusion appt time. Pt provided with sw folder and sw business card. Pt reports she attempted to call [facility] to apply and needed our office to call to confirm tx and dx ([phone]). Swer to call and check on this on a later date and f/u with pt on next week. Swer also discussed [facility]. Pt reports she is aware of this program and will call [facility] to check on benefit. She reports wanting to start with [facility] first. Swer discussed assistance with [facility] in helping pt navigate [facility] program, as they are partnered with [facility] to render services. Aside, from this swer assisted pt with referral to [facility]. Pt reports she was told she would qualify for [facility] $500 osbaldo twice. She previously received osbaldo and this would be second approval. Swer unaware of [facility] Services qualifications osbaldo, but has faxed pt referral today (fax#[phone]). Aside from this, pt reports she feels she gets "winded" when walking to and from areas of her home on a daily basis and reports needing to rest at times when getting dressed. Pt reports she has some concerns about this prior to tx and now starting a new drug, she is concerned about how she will be effected. Pt reports she sees pulmonology and cardiology regularly. Pt reports on last pulmonology visit, she completed a walking test and did not qualify for oxygen. Pt reports she has DME equipment at home from her spouse if needed. Pt reports she does not feel she needs a walker at this time. Pt reports she spends most days at home alone and spends a good bit of her time resting in her bedroom. Pt reports she feels she would greatly benefit from [facility]. Swer to f/u with pt on next week. Pt reports no other needs/concerns today.       "

## 2024-09-09 ENCOUNTER — TELEPHONE (OUTPATIENT)
Dept: HEMATOLOGY/ONCOLOGY | Facility: CLINIC | Age: 69
End: 2024-09-09
Payer: MEDICARE

## 2024-09-09 NOTE — TELEPHONE ENCOUNTER
2:08 pm - Swer called [facility] in [facility]: [phone] to inquire about dx verification and chemo tx on pt. Rep states that typically documentation would have been faxed to office to verify and paperwork would be faxed back into [facility]. Rep reviewed pt information, with name and  and states there is nothing on file for pt. Rep states pt would need to call and apply.     2:17 pm - Swer called pt ([phone]) to provide update. Swer received no telephone pickup. Swer was able to leave a  for a call back. Pt reports she tried to apply but was told she did not qualify. Pt reports she only provided info on social and was told she did not qualify. Swer inquired if pt would be agreeable to conference call with [facility]. Pt agreeable. Together, swer and pt called to walk through telephone application process. Rep reported pt does not qualify for [facility] (program currently has no wait time) at this time, d/t pt not having full Medicaid coverage, pt currently has partial. Rep provided pt with telephone number ([phone]) to call to apply/request full Medicaid coverage. Pt signed up for [facility] Waiver program (11 month wait) in the meantime. Rep answered all of pt's questions. Pt to keep swer posted on Medicaid coverage. Pt reports she does not know what happened to her blue folder sw provided on last visit. Pt asked for number to Cancer Services and SW phone number. Swer provided pt with both. Aside from this, pt has no needs. Swer will remain available.

## 2024-09-10 ENCOUNTER — TELEPHONE (OUTPATIENT)
Dept: HEMATOLOGY/ONCOLOGY | Facility: CLINIC | Age: 69
End: 2024-09-10
Payer: MEDICARE

## 2024-09-10 NOTE — TELEPHONE ENCOUNTER
"Call placed to check in on pt post infusion. Pt reports nausea/vomiting yesterday that is resolving today. Her only other complaint is not being able to sleep through night since infusions started; she is able to fall asleep but not remain asleep. Message sent to Dr. House for advice and updated pt of MD recommendation of "I like for people to try the melatonin sleep spray - Dr. Gutierres.  She can try Unisom. If that doesn't work we can prescribe something". Pt verbalized understanding. Pt denies any further needs at this time. Upcoming appts reviewed with pt who agrees.       Oncology Navigation   Intake  Date of Diagnosis: 24 (repeat LN bx)  Cancer Type: Gynecologic  Type of Referral: Internal  Initial Nurse Navigator Contact: 24  Start of Treatment: 24  Diagnosis to Treat Timeline (days): 15 days     Treatment  Current Status: Active       Medical Oncologist: Harsh  Chemotherapy: Planned  Chemotherapy Regimen: Doxil  Delays/Reductions Due to:: Other (insufficient tissue for genetic testing, repeat bx needed)  Immunotherapy: Planned  Immunotherapy Name: Ailyn  Start Date: 24                       Acuity  Stage: 2  Systemic Treatment - predicted or initiated: Chemotherapy Regimen with Multiple drugs (+1)  Treatment Tolerability: 1  ECO  Comorbidities in Medical History: 1  Hospitalization Within the Past Month: 0   Needed: 0  Support: 0  Verbalizes Financial Concerns: 0  Transportation: 0  History of noncompliance/frequent no shows and cancellations: 0  Verbalizes the need for more education: 0  Navigation Acuity: 5     Follow Up  No follow-ups on file.       "

## 2024-09-18 ENCOUNTER — LAB VISIT (OUTPATIENT)
Dept: LAB | Facility: HOSPITAL | Age: 69
End: 2024-09-18
Attending: INTERNAL MEDICINE
Payer: MEDICARE

## 2024-09-18 DIAGNOSIS — Z90.722 S/P BSO (BILATERAL SALPINGO-OOPHORECTOMY): ICD-10-CM

## 2024-09-18 DIAGNOSIS — C56.3 OVARIAN CANCER, BILATERAL: ICD-10-CM

## 2024-09-18 LAB
ALBUMIN SERPL BCP-MCNC: 3.2 G/DL (ref 3.5–5.2)
ALP SERPL-CCNC: 147 U/L (ref 55–135)
ALT SERPL W/O P-5'-P-CCNC: 83 U/L (ref 10–44)
ANION GAP SERPL CALC-SCNC: 8 MMOL/L (ref 8–16)
AST SERPL-CCNC: 107 U/L (ref 10–40)
BASOPHILS # BLD AUTO: 0.07 K/UL (ref 0–0.2)
BASOPHILS NFR BLD: 1.4 % (ref 0–1.9)
BILIRUB SERPL-MCNC: 0.5 MG/DL (ref 0.1–1)
BUN SERPL-MCNC: 23 MG/DL (ref 8–23)
CALCIUM SERPL-MCNC: 9.7 MG/DL (ref 8.7–10.5)
CHLORIDE SERPL-SCNC: 107 MMOL/L (ref 95–110)
CO2 SERPL-SCNC: 28 MMOL/L (ref 23–29)
CREAT SERPL-MCNC: 1.2 MG/DL (ref 0.5–1.4)
DIFFERENTIAL METHOD BLD: ABNORMAL
EOSINOPHIL # BLD AUTO: 0.1 K/UL (ref 0–0.5)
EOSINOPHIL NFR BLD: 1.4 % (ref 0–8)
ERYTHROCYTE [DISTWIDTH] IN BLOOD BY AUTOMATED COUNT: 16.2 % (ref 11.5–14.5)
EST. GFR  (NO RACE VARIABLE): 49 ML/MIN/1.73 M^2
GLUCOSE SERPL-MCNC: 97 MG/DL (ref 70–110)
HCT VFR BLD AUTO: 37.2 % (ref 37–48.5)
HGB BLD-MCNC: 11.6 G/DL (ref 12–16)
IMM GRANULOCYTES # BLD AUTO: 0.01 K/UL (ref 0–0.04)
IMM GRANULOCYTES NFR BLD AUTO: 0.2 % (ref 0–0.5)
LYMPHOCYTES # BLD AUTO: 1.1 K/UL (ref 1–4.8)
LYMPHOCYTES NFR BLD: 21.4 % (ref 18–48)
MAGNESIUM SERPL-MCNC: 1.7 MG/DL (ref 1.6–2.6)
MCH RBC QN AUTO: 27.6 PG (ref 27–31)
MCHC RBC AUTO-ENTMCNC: 31.2 G/DL (ref 32–36)
MCV RBC AUTO: 88 FL (ref 82–98)
MONOCYTES # BLD AUTO: 0.2 K/UL (ref 0.3–1)
MONOCYTES NFR BLD: 3.4 % (ref 4–15)
NEUTROPHILS # BLD AUTO: 3.6 K/UL (ref 1.8–7.7)
NEUTROPHILS NFR BLD: 72.2 % (ref 38–73)
NRBC BLD-RTO: 0 /100 WBC
PHOSPHATE SERPL-MCNC: 3.5 MG/DL (ref 2.7–4.5)
PLATELET # BLD AUTO: 269 K/UL (ref 150–450)
PMV BLD AUTO: 9.9 FL (ref 9.2–12.9)
POTASSIUM SERPL-SCNC: 4 MMOL/L (ref 3.5–5.1)
PROT SERPL-MCNC: 7.2 G/DL (ref 6–8.4)
RBC # BLD AUTO: 4.21 M/UL (ref 4–5.4)
SODIUM SERPL-SCNC: 143 MMOL/L (ref 136–145)
WBC # BLD AUTO: 4.99 K/UL (ref 3.9–12.7)

## 2024-09-18 PROCEDURE — 84100 ASSAY OF PHOSPHORUS: CPT | Mod: HCNC | Performed by: INTERNAL MEDICINE

## 2024-09-18 PROCEDURE — 36415 COLL VENOUS BLD VENIPUNCTURE: CPT | Mod: HCNC | Performed by: INTERNAL MEDICINE

## 2024-09-18 PROCEDURE — 80053 COMPREHEN METABOLIC PANEL: CPT | Mod: HCNC | Performed by: INTERNAL MEDICINE

## 2024-09-18 PROCEDURE — 83735 ASSAY OF MAGNESIUM: CPT | Mod: HCNC | Performed by: INTERNAL MEDICINE

## 2024-09-18 PROCEDURE — 85025 COMPLETE CBC W/AUTO DIFF WBC: CPT | Mod: HCNC | Performed by: INTERNAL MEDICINE

## 2024-09-19 ENCOUNTER — OFFICE VISIT (OUTPATIENT)
Dept: HEMATOLOGY/ONCOLOGY | Facility: CLINIC | Age: 69
End: 2024-09-19
Payer: MEDICARE

## 2024-09-19 DIAGNOSIS — C56.3 OVARIAN CANCER, BILATERAL: Primary | ICD-10-CM

## 2024-09-19 DIAGNOSIS — R13.10 DYSPHAGIA, UNSPECIFIED TYPE: ICD-10-CM

## 2024-09-19 DIAGNOSIS — C78.6 PERITONEAL CARCINOMATOSIS: ICD-10-CM

## 2024-09-19 NOTE — PROGRESS NOTES
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  - Right ureteral obstruction with indwelling ureteral stent     Previous Treatment:    - 02/2019 - 07/2019: Carboplatin, paclitaxel and Avastin x 6 cycles  - 08/15/2019:  salpingo-oophorectomy, ureterolysis/Omentectomy with complete pathologic response  - 10/2019 - 9/2020 Avastin maintenance   - Carboplatin and Paclitaxel (09/29/2023 - 02/14/2024)    Current Treatment:  Doxorubicin and Bevacizumab (09/06/24 - )    The patient location is: [place]  The chief complaint leading to consultation is: Follow up    Visit type: audiovisual    Face to Face time with patient: 15  30 minutes of total time spent on the encounter, which includes face to face time and non-face to face time preparing to see the patient (eg, review of tests), Obtaining and/or reviewing separately obtained history, Documenting clinical information in the electronic or other health record, Independently interpreting results (not separately reported) and communicating results to the patient/family/caregiver, or Care coordination (not separately reported).     Each patient to whom he or she provides medical services by telemedicine is:  (1) informed of the relationship between the physician and patient and the respective role of any other health care provider with respect to management of the patient; and (2) notified that he or she may decline to receive medical services by telemedicine and may withdraw from such care at any time.    Notes:   Subjective   Casie Gaines is a pleasant 68 y.o. female who presents to clinic for follow up.    She is tolerating chemotherapy well overall.  None of her symptoms have improved.  She feels fatigued, and still has a bloated feeling.  She had some diarrhea but it resolved.  Her most bothersome symptom is difficulty  swallowing.     Her appetite is good but she feels as if food is stuck in her chest and she has to lay down for it to digest.  Sometimes solid foods will go down after drinking liquid after but then she feels bloated.  She is able to get down yogurt, pudding and liquids. She is not supplementing with Boost or Ensure; counseled to do so.  She is taking some natural supplements (Graviola/Soursoup and  Alkaline balance).  Recommend swallowing evaluation.      Review of Systems   Constitutional:  Positive for diaphoresis and fatigue. Negative for activity change, appetite change, chills, fever and unexpected weight change.   HENT:  Positive for congestion and trouble swallowing. Negative for nosebleeds.    Respiratory:  Negative for shortness of breath.    Gastrointestinal:  Positive for abdominal distention and nausea. Negative for constipation, diarrhea and vomiting.   Musculoskeletal:  Negative for back pain.   Skin:  Negative for rash.   Neurological:  Positive for numbness. Negative for dizziness, weakness, light-headedness and headaches.   Hematological:  Does not bruise/bleed easily.   Psychiatric/Behavioral:  The patient is not nervous/anxious.      History     Oncology History   Peritoneal carcinomatosis   1/26/2019 Initial Diagnosis    Peritoneal carcinomatosis     2/15/2019 - 7/8/2019 Chemotherapy    Treatment Summary   Plan Name: OP GYN PACLITAXEL CARBOPLATIN (AUC 6) Q3W  Treatment Goal: Palliative  Status: Inactive  Start Date: 2/28/2019  End Date: 6/18/2019  Provider: Will Trevizo Jr., MD  Chemotherapy: bevacizumab (AVASTIN) 15 mg/kg = 1,600 mg in sodium chloride 0.9% 100 mL chemo infusion, 15 mg/kg = 1,600 mg (100 % of original dose 15 mg/kg), Intravenous, Clinic/[facility] 1 time, 6 of 6 cycles  Dose modification: 15 mg/kg (original dose 15 mg/kg, Cycle 1)  Administration: 1,600 mg (2/28/2019), 1,600 mg (3/21/2019), 1,600 mg (4/11/2019), 1,600 mg (5/7/2019), 1,600 mg (5/28/2019), 1,510 mg  (6/18/2019)  CARBOplatin (PARAPLATIN) 870 mg in sodium chloride 0.9% 337 mL chemo infusion, 870 mg (100 % of original dose 868.8 mg), Intravenous, Clinic/[facility] 1 time, 6 of 6 cycles  Dose modification:   (original dose 868.8 mg, Cycle 1)  Administration: 870 mg (2/28/2019), 870 mg (3/21/2019), 900 mg (4/11/2019), 870 mg (5/7/2019), 660 mg (5/28/2019), 690 mg (6/18/2019)  PACLitaxel (TAXOL) 175 mg/m2 = 396 mg in sodium chloride 0.9% 566 mL chemo infusion, 175 mg/m2 = 396 mg, Intravenous, Clinic/[facility] 1 time, 6 of 6 cycles  Dose modification: 140 mg/m2 (80 % of original dose 175 mg/m2, Cycle 5)  Administration: 396 mg (2/28/2019), 396 mg (3/21/2019), 396 mg (4/11/2019), 396 mg (5/7/2019), 318 mg (5/28/2019), 306 mg (6/18/2019)     10/9/2019 - 9/24/2020 Chemotherapy    Treatment Summary   Plan Name: OP BEVACIZUMAB Q3W   Treatment Goal: Maintenance  Status: Inactive  Start Date: 10/9/2019  End Date: 9/24/2020  Provider: Oscar Mckeon MD  Chemotherapy: dexAMETHasone tablet 8 mg, 8 mg (100 % of original dose 8 mg), Oral, Clinic/[facility] 1 time, 2 of 8 cycles  Dose modification: 8 mg (original dose 8 mg, Cycle 8), 8 mg (original dose 8 mg, Cycle 12)  Administration: 8 mg (7/22/2020), 8 mg (8/13/2020)  bevacizumab (AVASTIN) 15 mg/kg = 1,615 mg in sodium chloride 0.9% 100 mL chemo infusion, 15 mg/kg = 1,615 mg, Intravenous, Clinic/[facility] 1 time, 11 of 17 cycles  Administration: 1,615 mg (10/9/2019), 1,615 mg (10/29/2019), 1,615 mg (12/10/2019), 1,615 mg (1/21/2020), 1,600 mg (4/2/2020), 1,615 mg (4/23/2020), 1,600 mg (7/1/2020), 1,600 mg (7/22/2020), 1,600 mg (8/13/2020), 1,795 mg (9/3/2020), 1,795 mg (9/24/2020)     9/29/2023 - 2/14/2024 Chemotherapy    Treatment Summary   Plan Name: OP GYN PACLITAXEL CARBOPLATIN desensitization (AUC 5) Q3W  Treatment Goal: Control  Status: Inactive  Start Date: 9/29/2023  End Date: 2/14/2024  Provider: Ismael Juarez MD  Chemotherapy: CARBOplatin (PARAPLATIN) 650 mg in sodium chloride 0.9% 335 mL  chemo infusion, 650 mg (100 % of original dose 648 mg), Intravenous, Clinic/[facility] 1 time, 6 of 17 cycles  Dose modification:   (original dose 648 mg, Cycle 1),   (original dose 853.8 mg, Cycle 2),   (original dose 730 mg, Cycle 3),   (original dose 730 mg, Cycle 4), 5 mg (original dose 730 mg, Cycle 4), 7.3 mg (original dose 730 mg, Cycle 4), 5 mg (original dose 730 mg, Cycle 4, Reason: MD Discretion, Comment: desensitization), 73 mg (original dose 730 mg, Cycle 4), 720 mg (original dose 730 mg, Cycle 4, Reason: MD Discretion, Comment: desensitization), 648.97 mg (original dose 730 mg, Cycle 4, Reason: MD Discretion, Comment: desensitization), 0.61 mg (original dose 730 mg, Cycle 5, Reason: MD Discretion, Comment: desensitization), 5 mg (original dose 730 mg, Cycle 5, Reason: MD Discretion, Comment: desens), 60.5 mg (original dose 730 mg, Cycle 5, Reason: MD Discretion, Comment: desensitization),   (original dose 730 mg, Cycle 5, Reason: MD Discretion, Comment: new scr), 6.05 mg (original dose 730 mg, Cycle 5, Reason: Dose not tolerated, Comment: desensitization)  Administration: 650 mg (9/29/2023), 730 mg (11/10/2023), 710 mg (10/20/2023), 5 mg (1/3/2024), 5 mg (1/3/2024), 75 mg (1/3/2024), 650 mg (1/3/2024), 5 mg (2/14/2024), 5 mg (2/14/2024), 75 mg (2/14/2024), 650 mg (2/14/2024), 5 mg (1/24/2024), 60 mg (1/24/2024), 605 mg (1/24/2024), 5 mg (1/24/2024)  PACLitaxeL (TAXOL) 175 mg/m2 = 402 mg in sodium chloride 0.9% 500 mL chemo infusion, 175 mg/m2 = 402 mg, Intravenous, Clinic/[facility] 1 time, 6 of 17 cycles  Administration: 402 mg (9/29/2023), 402 mg (10/20/2023), 408 mg (11/10/2023), 408 mg (1/3/2024), 408 mg (1/24/2024), 408 mg (2/14/2024)     9/6/2024 -  Chemotherapy    Treatment Summary   Plan Name: OP GYN bevacizumab liposomal DOXOrubicin Q4W  Treatment Goal: Control  Status: Active  Start Date: 9/6/2024  End Date: 7/26/2025 (Planned)  Provider: Torri House MD  Chemotherapy: DOXOrubicin liposome (DOXIL) 92  mg in D5W 611 mL chemo infusion, 40 mg/m2 = 92 mg, Intravenous, Clinic/[facility] 1 time, 1 of 12 cycles  Administration: 92 mg (9/6/2024)  bevacizumab-awwb (MVASI) 1,100 mg in 0.9% NaCl 100 mL infusion, 1,125 mg, Intravenous, Clinic/[facility] 1 time, 1 of 12 cycles  Administration: 1,100 mg (9/6/2024)      Chemotherapy    Treatment Summary   Plan Name: OP GYN PACLITAXEL CARBOPLATIN (AUC 6) Q3W  Treatment Goal: Palliative  Status: Inactive  Start Date: 2/28/2019  End Date: 6/18/2019  Provider: Will Trevizo Jr., MD  Chemotherapy: bevacizumab (AVASTIN) 15 mg/kg = 1,600 mg in sodium chloride 0.9% 100 mL chemo infusion, 15 mg/kg = 1,600 mg (100 % of original dose 15 mg/kg), Intravenous, Clinic/[facility] 1 time, 6 of 6 cycles  Dose modification: 15 mg/kg (original dose 15 mg/kg, Cycle 1)  Administration: 1,600 mg (2/28/2019), 1,600 mg (3/21/2019), 1,600 mg (4/11/2019), 1,600 mg (5/7/2019), 1,600 mg (5/28/2019), 1,510 mg (6/18/2019)    CARBOplatin (PARAPLATIN) 870 mg in sodium chloride 0.9% 337 mL chemo infusion, 870 mg (100 % of original dose 868.8 mg), Intravenous, Clinic/[facility] 1 time, 6 of 6 cycles  Dose modification:   (original dose 868.8 mg, Cycle 1)  Administration: 870 mg (2/28/2019), 870 mg (3/21/2019), 900 mg (4/11/2019), 870 mg (5/7/2019), 660 mg (5/28/2019), 690 mg (6/18/2019)    PACLitaxel (TAXOL) 175 mg/m2 = 396 mg in sodium chloride 0.9% 566 mL chemo infusion, 175 mg/m2 = 396 mg, Intravenous, Clinic/[facility] 1 time, 6 of 6 cycles  Dose modification: 140 mg/m2 (80 % of original dose 175 mg/m2, Cycle 5)  Administration: 396 mg (2/28/2019), 396 mg (3/21/2019), 396 mg (4/11/2019), 396 mg (5/7/2019), 318 mg (5/28/2019), 306 mg (6/18/2019)    Plan Name: OP BEVACIZUMAB Q3W   Treatment Goal: Maintenance  Status: Inactive  Start Date: 10/9/2019  End Date: 9/24/2020  Provider: Oscar Mckeon MD  Chemotherapy: bevacizumab (AVASTIN) 15 mg/kg = 1,615 mg in sodium chloride 0.9% 100 mL chemo infusion, 15 mg/kg = 1,615 mg, Intravenous,  Clinic/HOD 1 time, 11 of 17 cycles  Administration: 1,615 mg (10/9/2019), 1,615 mg (10/29/2019), 1,615 mg (12/10/2019), 1,615 mg (1/21/2020), 1,600 mg (4/2/2020), 1,615 mg (4/23/2020), 1,600 mg (7/1/2020), 1,600 mg (7/22/2020), 1,600 mg (8/13/2020), 1,795 mg (9/3/2020), 1,795 mg (9/24/2020)    Plan Name: OP GYN PACLITAXEL CARBOPLATIN desensitization (AUC 5) Q3W  Treatment Goal: Control  Status: Inactive  Start Date: 9/29/2023  End Date: 2/14/2024  Provider: Ismael Juarez MD  Chemotherapy: CARBOplatin (PARAPLATIN) 650 mg in sodium chloride 0.9% 335 mL chemo infusion, 650 mg (100 % of original dose 648 mg), Intravenous, Clinic/[facility] 1 time, 6 of 17 cycles  Dose modification:   (original dose 648 mg, Cycle 1),   (original dose 853.8 mg, Cycle 2),   (original dose 730 mg, Cycle 3),   (original dose 730 mg, Cycle 4), 5 mg (original dose 730 mg, Cycle 4), 7.3 mg (original dose 730 mg, Cycle 4), 5 mg (original dose 730 mg, Cycle 4, Reason: MD Discretion, Comment: desensitization), 73 mg (original dose 730 mg, Cycle 4), 720 mg (original dose 730 mg, Cycle 4, Reason: MD Discretion, Comment: desensitization), 648.97 mg (original dose 730 mg, Cycle 4, Reason: MD Discretion, Comment: desensitization), 0.61 mg (original dose 730 mg, Cycle 5, Reason: MD Discretion, Comment: desensitization), 5 mg (original dose 730 mg, Cycle 5, Reason: MD Discretion, Comment: desens), 60.5 mg (original dose 730 mg, Cycle 5, Reason: MD Discretion, Comment: desensitization),   (original dose 730 mg, Cycle 5, Reason: MD Discretion, Comment: new scr), 6.05 mg (original dose 730 mg, Cycle 5, Reason: Dose not tolerated, Comment: desensitization)  Administration: 650 mg (9/29/2023), 730 mg (11/10/2023), 710 mg (10/20/2023), 5 mg (1/3/2024), 5 mg (1/3/2024), 75 mg (1/3/2024), 650 mg (1/3/2024), 5 mg (2/14/2024), 5 mg (2/14/2024), 75 mg (2/14/2024), 650 mg (2/14/2024), 5 mg (1/24/2024), 60 mg (1/24/2024), 605 mg (1/24/2024), 5 mg (1/24/2024)    PACLitaxeL  (TAXOL) 175 mg/m2 = 402 mg in sodium chloride 0.9% 500 mL chemo infusion, 175 mg/m2 = 402 mg, Intravenous, Clinic/H[facility] 1 time, 6 of 17 cycles  Administration: 402 mg (9/29/2023), 402 mg (10/20/2023), 408 mg (11/10/2023), 408 mg (1/3/2024), 408 mg (1/24/2024), 408 mg (2/14/2024)    Plan Name: OP GYN bevacizumab liposomal DOXOrubicin Q4W  Treatment Goal: Control  Status: Active  Start Date: 9/3/2024 (Planned)  End Date: 7/22/2025 (Planned)  Provider: Torri House MD  Chemotherapy: DOXOrubicin liposome (DOXIL) 92 mg in D5W 296 mL chemo infusion, 40 mg/m2 = 92 mg, Intravenous, Clinic/H[facility] 1 time, 0 of 12 cycles    bevacizumab-awwb (MVASI) 10 mg/kg = 1,125 mg in 0.9% NaCl 100 mL infusion, 10 mg/kg = 1,125 mg, Intravenous, Clinic/HOD 1 time, 0 of 12 cycles       Malignant neoplasm of right ovary (Resolved)   2/15/2019 Initial Diagnosis    Malignant neoplasm of right ovary     2/15/2019 - 7/8/2019 Chemotherapy    Treatment Summary   Plan Name: OP GYN PACLITAXEL CARBOPLATIN (AUC 6) Q3W  Treatment Goal: Palliative  Status: Inactive  Start Date: 2/28/2019  End Date: 6/18/2019  Provider: Will Trevizo Jr., MD  Chemotherapy: bevacizumab (AVASTIN) 15 mg/kg = 1,600 mg in sodium chloride 0.9% 100 mL chemo infusion, 15 mg/kg = 1,600 mg (100 % of original dose 15 mg/kg), Intravenous, Clinic/HOD 1 time, 6 of 6 cycles  Dose modification: 15 mg/kg (original dose 15 mg/kg, Cycle 1)  Administration: 1,600 mg (2/28/2019), 1,600 mg (3/21/2019), 1,600 mg (4/11/2019), 1,600 mg (5/7/2019), 1,600 mg (5/28/2019), 1,510 mg (6/18/2019)  CARBOplatin (PARAPLATIN) 870 mg in sodium chloride 0.9% 337 mL chemo infusion, 870 mg (100 % of original dose 868.8 mg), Intravenous, Clinic/\[facility]\"" 1 time, 6 of 6 cycles  Dose modification:   (original dose 868.8 mg, Cycle 1)  Administration: 870 mg (2/28/2019), 870 mg (3/21/2019), 900 mg (4/11/2019), 870 mg (5/7/2019), 660 mg (5/28/2019), 690 mg (6/18/2019)  PACLitaxel (TAXOL) 175 mg/m2 = 396 mg in sodium  chloride 0.9% 566 mL chemo infusion, 175 mg/m2 = 396 mg, Intravenous, Clinic/HOD 1 time, 6 of 6 cycles  Dose modification: 140 mg/m2 (80 % of original dose 175 mg/m2, Cycle 5)  Administration: 396 mg (2/28/2019), 396 mg (3/21/2019), 396 mg (4/11/2019), 396 mg (5/7/2019), 318 mg (5/28/2019), 306 mg (6/18/2019)     10/9/2019 - 9/24/2020 Chemotherapy    Treatment Summary   Plan Name: OP BEVACIZUMAB Q3W   Treatment Goal: Maintenance  Status: Inactive  Start Date: 10/9/2019  End Date: 9/24/2020  Provider: Oscar Mckeon MD  Chemotherapy: dexAMETHasone tablet 8 mg, 8 mg (100 % of original dose 8 mg), Oral, Clinic/HOD 1 time, 2 of 8 cycles  Dose modification: 8 mg (original dose 8 mg, Cycle 8), 8 mg (original dose 8 mg, Cycle 12)  Administration: 8 mg (7/22/2020), 8 mg (8/13/2020)  bevacizumab (AVASTIN) 15 mg/kg = 1,615 mg in sodium chloride 0.9% 100 mL chemo infusion, 15 mg/kg = 1,615 mg, Intravenous, Clinic/HOD 1 time, 11 of 17 cycles  Administration: 1,615 mg (10/9/2019), 1,615 mg (10/29/2019), 1,615 mg (12/10/2019), 1,615 mg (1/21/2020), 1,600 mg (4/2/2020), 1,615 mg (4/23/2020), 1,600 mg (7/1/2020), 1,600 mg (7/22/2020), 1,600 mg (8/13/2020), 1,795 mg (9/3/2020), 1,795 mg (9/24/2020)      Chemotherapy    Treatment Summary   Plan Name: OP GYN PACLITAXEL CARBOPLATIN (AUC 6) Q3W  Treatment Goal: Palliative  Status: Inactive  Start Date: 2/28/2019  End Date: 6/18/2019  Provider: Will Trevizo Jr., MD  Chemotherapy: bevacizumab (AVASTIN) 15 mg/kg = 1,600 mg in sodium chloride 0.9% 100 mL chemo infusion, 15 mg/kg = 1,600 mg (100 % of original dose 15 mg/kg), Intravenous, Clinic/[facility] 1 time, 6 of 6 cycles  Dose modification: 15 mg/kg (original dose 15 mg/kg, Cycle 1)  Administration: 1,600 mg (2/28/2019), 1,600 mg (3/21/2019), 1,600 mg (4/11/2019), 1,600 mg (5/7/2019), 1,600 mg (5/28/2019), 1,510 mg (6/18/2019)    CARBOplatin (PARAPLATIN) 870 mg in sodium chloride 0.9% 337 mL chemo infusion, 870 mg (100 % of original dose  868.8 mg), Intravenous, Clinic/HOD 1 time, 6 of 6 cycles  Dose modification:   (original dose 868.8 mg, Cycle 1)  Administration: 870 mg (2/28/2019), 870 mg (3/21/2019), 900 mg (4/11/2019), 870 mg (5/7/2019), 660 mg (5/28/2019), 690 mg (6/18/2019)    PACLitaxel (TAXOL) 175 mg/m2 = 396 mg in sodium chloride 0.9% 566 mL chemo infusion, 175 mg/m2 = 396 mg, Intravenous, Clinic/HOD 1 time, 6 of 6 cycles  Dose modification: 140 mg/m2 (80 % of original dose 175 mg/m2, Cycle 5)  Administration: 396 mg (2/28/2019), 396 mg (3/21/2019), 396 mg (4/11/2019), 396 mg (5/7/2019), 318 mg (5/28/2019), 306 mg (6/18/2019)    Plan Name: OP BEVACIZUMAB Q3W   Treatment Goal: Maintenance  Status: Inactive  Start Date: 10/9/2019  End Date: 9/24/2020  Provider: Oscar Mckeon MD  Chemotherapy: bevacizumab (AVASTIN) 15 mg/kg = 1,615 mg in sodium chloride 0.9% 100 mL chemo infusion, 15 mg/kg = 1,615 mg, Intravenous, Clinic/HOD 1 time, 11 of 17 cycles  Administration: 1,615 mg (10/9/2019), 1,615 mg (10/29/2019), 1,615 mg (12/10/2019), 1,615 mg (1/21/2020), 1,600 mg (4/2/2020), 1,615 mg (4/23/2020), 1,600 mg (7/1/2020), 1,600 mg (7/22/2020), 1,600 mg (8/13/2020), 1,795 mg (9/3/2020), 1,795 mg (9/24/2020)    Plan Name: OP GYN PACLITAXEL CARBOPLATIN desensitization (AUC 5) Q3W  Treatment Goal: Control  Status: Inactive  Start Date: 9/29/2023  End Date: 2/14/2024  Provider: Ismael Juarez MD  Chemotherapy: CARBOplatin (PARAPLATIN) 650 mg in sodium chloride 0.9% 335 mL chemo infusion, 650 mg (100 % of original dose 648 mg), Intravenous, Clinic/HOD 1 time, 6 of 17 cycles  Dose modification:   (original dose 648 mg, Cycle 1),   (original dose 853.8 mg, Cycle 2),   (original dose 730 mg, Cycle 3),   (original dose 730 mg, Cycle 4), 5 mg (original dose 730 mg, Cycle 4), 7.3 mg (original dose 730 mg, Cycle 4), 5 mg (original dose 730 mg, Cycle 4, Reason: MD Discretion, Comment: desensitization), 73 mg (original dose 730 mg, Cycle 4), 720 mg (original dose  730 mg, Cycle 4, Reason: MD Discretion, Comment: desensitization), 648.97 mg (original dose 730 mg, Cycle 4, Reason: MD Discretion, Comment: desensitization), 0.61 mg (original dose 730 mg, Cycle 5, Reason: MD Discretion, Comment: desensitization), 5 mg (original dose 730 mg, Cycle 5, Reason: MD Discretion, Comment: desens), 60.5 mg (original dose 730 mg, Cycle 5, Reason: MD Discretion, Comment: desensitization),   (original dose 730 mg, Cycle 5, Reason: MD Discretion, Comment: new scr), 6.05 mg (original dose 730 mg, Cycle 5, Reason: Dose not tolerated, Comment: desensitization)  Administration: 650 mg (9/29/2023), 730 mg (11/10/2023), 710 mg (10/20/2023), 5 mg (1/3/2024), 5 mg (1/3/2024), 75 mg (1/3/2024), 650 mg (1/3/2024), 5 mg (2/14/2024), 5 mg (2/14/2024), 75 mg (2/14/2024), 650 mg (2/14/2024), 5 mg (1/24/2024), 60 mg (1/24/2024), 605 mg (1/24/2024), 5 mg (1/24/2024)    PACLitaxeL (TAXOL) 175 mg/m2 = 402 mg in sodium chloride 0.9% 500 mL chemo infusion, 175 mg/m2 = 402 mg, Intravenous, Clinic/HOD 1 time, 6 of 17 cycles  Administration: 402 mg (9/29/2023), 402 mg (10/20/2023), 408 mg (11/10/2023), 408 mg (1/3/2024), 408 mg (1/24/2024), 408 mg (2/14/2024)    Plan Name: OP GYN bevacizumab liposomal DOXOrubicin Q4W  Treatment Goal: Control  Status: Active  Start Date: 9/3/2024 (Planned)  End Date: 7/22/2025 (Planned)  Provider: Torri House MD  Chemotherapy: DOXOrubicin liposome (DOXIL) 92 mg in D5W 296 mL chemo infusion, 40 mg/m2 = 92 mg, Intravenous, Clinic/HOD 1 time, 0 of 12 cycles    bevacizumab-awwb (MVASI) 10 mg/kg = 1,125 mg in 0.9% NaCl 100 mL infusion, 10 mg/kg = 1,125 mg, Intravenous, Clinic/HOD 1 time, 0 of 12 cycles       Malignant neoplasm of both ovaries (Resolved)   2/18/2019 Initial Diagnosis    Ovarian cancer     10/9/2019 - 9/24/2020 Chemotherapy    Treatment Summary   Plan Name: OP BEVACIZUMAB Q3W   Treatment Goal: Maintenance  Status: Inactive  Start Date: 10/9/2019  End Date:  9/24/2020  Provider: Oscar Mckeon MD  Chemotherapy: dexAMETHasone tablet 8 mg, 8 mg (100 % of original dose 8 mg), Oral, Clinic/HOD 1 time, 2 of 8 cycles  Dose modification: 8 mg (original dose 8 mg, Cycle 8), 8 mg (original dose 8 mg, Cycle 12)  Administration: 8 mg (7/22/2020), 8 mg (8/13/2020)  bevacizumab (AVASTIN) 15 mg/kg = 1,615 mg in sodium chloride 0.9% 100 mL chemo infusion, 15 mg/kg = 1,615 mg, Intravenous, Clinic/HOD 1 time, 11 of 17 cycles  Administration: 1,615 mg (10/9/2019), 1,615 mg (10/29/2019), 1,615 mg (12/10/2019), 1,615 mg (1/21/2020), 1,600 mg (4/2/2020), 1,615 mg (4/23/2020), 1,600 mg (7/1/2020), 1,600 mg (7/22/2020), 1,600 mg (8/13/2020), 1,795 mg (9/3/2020), 1,795 mg (9/24/2020)      Chemotherapy    Treatment Summary   Plan Name: OP GYN PACLITAXEL CARBOPLATIN (AUC 6) Q3W  Treatment Goal: Palliative  Status: Inactive  Start Date: 2/28/2019  End Date: 6/18/2019  Provider: Will Trevizo Jr., MD  Chemotherapy: bevacizumab (AVASTIN) 15 mg/kg = 1,600 mg in sodium chloride 0.9% 100 mL chemo infusion, 15 mg/kg = 1,600 mg (100 % of original dose 15 mg/kg), Intravenous, Clinic/HOD 1 time, 6 of 6 cycles  Dose modification: 15 mg/kg (original dose 15 mg/kg, Cycle 1)  Administration: 1,600 mg (2/28/2019), 1,600 mg (3/21/2019), 1,600 mg (4/11/2019), 1,600 mg (5/7/2019), 1,600 mg (5/28/2019), 1,510 mg (6/18/2019)    CARBOplatin (PARAPLATIN) 870 mg in sodium chloride 0.9% 337 mL chemo infusion, 870 mg (100 % of original dose 868.8 mg), Intravenous, Clinic/HOD 1 time, 6 of 6 cycles  Dose modification:   (original dose 868.8 mg, Cycle 1)  Administration: 870 mg (2/28/2019), 870 mg (3/21/2019), 900 mg (4/11/2019), 870 mg (5/7/2019), 660 mg (5/28/2019), 690 mg (6/18/2019)    PACLitaxel (TAXOL) 175 mg/m2 = 396 mg in sodium chloride 0.9% 566 mL chemo infusion, 175 mg/m2 = 396 mg, Intravenous, Clinic/HOD 1 time, 6 of 6 cycles  Dose modification: 140 mg/m2 (80 % of original dose 175 mg/m2, Cycle  5)  Administration: 396 mg (2/28/2019), 396 mg (3/21/2019), 396 mg (4/11/2019), 396 mg (5/7/2019), 318 mg (5/28/2019), 306 mg (6/18/2019)    Plan Name: OP BEVACIZUMAB Q3W   Treatment Goal: Maintenance  Status: Inactive  Start Date: 10/9/2019  End Date: 9/24/2020  Provider: Oscar Mckeon MD  Chemotherapy: bevacizumab (AVASTIN) 15 mg/kg = 1,615 mg in sodium chloride 0.9% 100 mL chemo infusion, 15 mg/kg = 1,615 mg, Intravenous, Clinic/HOD 1 time, 11 of 17 cycles  Administration: 1,615 mg (10/9/2019), 1,615 mg (10/29/2019), 1,615 mg (12/10/2019), 1,615 mg (1/21/2020), 1,600 mg (4/2/2020), 1,615 mg (4/23/2020), 1,600 mg (7/1/2020), 1,600 mg (7/22/2020), 1,600 mg (8/13/2020), 1,795 mg (9/3/2020), 1,795 mg (9/24/2020)    Plan Name: OP GYN PACLITAXEL CARBOPLATIN desensitization (AUC 5) Q3W  Treatment Goal: Control  Status: Inactive  Start Date: 9/29/2023  End Date: 2/14/2024  Provider: Ismael Juarez MD  Chemotherapy: CARBOplatin (PARAPLATIN) 650 mg in sodium chloride 0.9% 335 mL chemo infusion, 650 mg (100 % of original dose 648 mg), Intravenous, Clinic/HOD 1 time, 6 of 17 cycles  Dose modification:   (original dose 648 mg, Cycle 1),   (original dose 853.8 mg, Cycle 2),   (original dose 730 mg, Cycle 3),   (original dose 730 mg, Cycle 4), 5 mg (original dose 730 mg, Cycle 4), 7.3 mg (original dose 730 mg, Cycle 4), 5 mg (original dose 730 mg, Cycle 4, Reason: MD Discretion, Comment: desensitization), 73 mg (original dose 730 mg, Cycle 4), 720 mg (original dose 730 mg, Cycle 4, Reason: MD Discretion, Comment: desensitization), 648.97 mg (original dose 730 mg, Cycle 4, Reason: MD Discretion, Comment: desensitization), 0.61 mg (original dose 730 mg, Cycle 5, Reason: MD Discretion, Comment: desensitization), 5 mg (original dose 730 mg, Cycle 5, Reason: MD Discretion, Comment: desens), 60.5 mg (original dose 730 mg, Cycle 5, Reason: MD Discretion, Comment: desensitization),   (original dose 730 mg, Cycle 5, Reason: MD  Discretion, Comment: new scr), 6.05 mg (original dose 730 mg, Cycle 5, Reason: Dose not tolerated, Comment: desensitization)  Administration: 650 mg (9/29/2023), 730 mg (11/10/2023), 710 mg (10/20/2023), 5 mg (1/3/2024), 5 mg (1/3/2024), 75 mg (1/3/2024), 650 mg (1/3/2024), 5 mg (2/14/2024), 5 mg (2/14/2024), 75 mg (2/14/2024), 650 mg (2/14/2024), 5 mg (1/24/2024), 60 mg (1/24/2024), 605 mg (1/24/2024), 5 mg (1/24/2024)    PACLitaxeL (TAXOL) 175 mg/m2 = 402 mg in sodium chloride 0.9% 500 mL chemo infusion, 175 mg/m2 = 402 mg, Intravenous, Clinic/HOD 1 time, 6 of 17 cycles  Administration: 402 mg (9/29/2023), 402 mg (10/20/2023), 408 mg (11/10/2023), 408 mg (1/3/2024), 408 mg (1/24/2024), 408 mg (2/14/2024)    Plan Name: OP GYN bevacizumab liposomal DOXOrubicin Q4W  Treatment Goal: Control  Status: Active  Start Date: 9/3/2024 (Planned)  End Date: 7/22/2025 (Planned)  Provider: Torri House MD  Chemotherapy: DOXOrubicin liposome (DOXIL) 92 mg in D5W 296 mL chemo infusion, 40 mg/m2 = 92 mg, Intravenous, Clinic/HOD 1 time, 0 of 12 cycles    bevacizumab-awwb (MVASI) 10 mg/kg = 1,125 mg in 0.9% NaCl 100 mL infusion, 10 mg/kg = 1,125 mg, Intravenous, Clinic/HOD 1 time, 0 of 12 cycles       Ovarian cancer, bilateral   8/15/2019 Initial Diagnosis    Ovarian cancer, bilateral     10/9/2019 - 9/24/2020 Chemotherapy    Treatment Summary   Plan Name: OP BEVACIZUMAB Q3W   Treatment Goal: Maintenance  Status: Inactive  Start Date: 10/9/2019  End Date: 9/24/2020  Provider: Oscar Mckeon MD  Chemotherapy: dexAMETHasone tablet 8 mg, 8 mg (100 % of original dose 8 mg), Oral, Clinic/HOD 1 time, 2 of 8 cycles  Dose modification: 8 mg (original dose 8 mg, Cycle 8), 8 mg (original dose 8 mg, Cycle 12)  Administration: 8 mg (7/22/2020), 8 mg (8/13/2020)  bevacizumab (AVASTIN) 15 mg/kg = 1,615 mg in sodium chloride 0.9% 100 mL chemo infusion, 15 mg/kg = 1,615 mg, Intravenous, Clinic/HOD 1 time, 11 of 17 cycles  Administration: 1,615  mg (10/9/2019), 1,615 mg (10/29/2019), 1,615 mg (12/10/2019), 1,615 mg (1/21/2020), 1,600 mg (4/2/2020), 1,615 mg (4/23/2020), 1,600 mg (7/1/2020), 1,600 mg (7/22/2020), 1,600 mg (8/13/2020), 1,795 mg (9/3/2020), 1,795 mg (9/24/2020)     9/29/2023 - 2/14/2024 Chemotherapy    Treatment Summary   Plan Name: OP GYN PACLITAXEL CARBOPLATIN desensitization (AUC 5) Q3W  Treatment Goal: Control  Status: Inactive  Start Date: 9/29/2023  End Date: 2/14/2024  Provider: Ismael Juarez MD  Chemotherapy: CARBOplatin (PARAPLATIN) 650 mg in sodium chloride 0.9% 335 mL chemo infusion, 650 mg (100 % of original dose 648 mg), Intravenous, Clinic/HOD 1 time, 6 of 17 cycles  Dose modification:   (original dose 648 mg, Cycle 1),   (original dose 853.8 mg, Cycle 2),   (original dose 730 mg, Cycle 3),   (original dose 730 mg, Cycle 4), 5 mg (original dose 730 mg, Cycle 4), 7.3 mg (original dose 730 mg, Cycle 4), 5 mg (original dose 730 mg, Cycle 4, Reason: MD Discretion, Comment: desensitization), 73 mg (original dose 730 mg, Cycle 4), 720 mg (original dose 730 mg, Cycle 4, Reason: MD Discretion, Comment: desensitization), 648.97 mg (original dose 730 mg, Cycle 4, Reason: MD Discretion, Comment: desensitization), 0.61 mg (original dose 730 mg, Cycle 5, Reason: MD Discretion, Comment: desensitization), 5 mg (original dose 730 mg, Cycle 5, Reason: MD Discretion, Comment: desens), 60.5 mg (original dose 730 mg, Cycle 5, Reason: MD Discretion, Comment: desensitization),   (original dose 730 mg, Cycle 5, Reason: MD Discretion, Comment: new scr), 6.05 mg (original dose 730 mg, Cycle 5, Reason: Dose not tolerated, Comment: desensitization)  Administration: 650 mg (9/29/2023), 730 mg (11/10/2023), 710 mg (10/20/2023), 5 mg (1/3/2024), 5 mg (1/3/2024), 75 mg (1/3/2024), 650 mg (1/3/2024), 5 mg (2/14/2024), 5 mg (2/14/2024), 75 mg (2/14/2024), 650 mg (2/14/2024), 5 mg (1/24/2024), 60 mg (1/24/2024), 605 mg (1/24/2024), 5 mg (1/24/2024)  PACLitaxeL  (TAXOL) 175 mg/m2 = 402 mg in sodium chloride 0.9% 500 mL chemo infusion, 175 mg/m2 = 402 mg, Intravenous, Clinic/HOD 1 time, 6 of 17 cycles  Administration: 402 mg (9/29/2023), 402 mg (10/20/2023), 408 mg (11/10/2023), 408 mg (1/3/2024), 408 mg (1/24/2024), 408 mg (2/14/2024)     9/6/2024 -  Chemotherapy    Treatment Summary   Plan Name: OP GYN bevacizumab liposomal DOXOrubicin Q4W  Treatment Goal: Control  Status: Active  Start Date: 9/6/2024  End Date: 7/26/2025 (Planned)  Provider: Torri House MD  Chemotherapy: DOXOrubicin liposome (DOXIL) 92 mg in D5W 611 mL chemo infusion, 40 mg/m2 = 92 mg, Intravenous, Clinic/HOD 1 time, 1 of 12 cycles  Administration: 92 mg (9/6/2024)  bevacizumab-awwb (MVASI) 1,100 mg in 0.9% NaCl 100 mL infusion, 1,125 mg, Intravenous, Clinic/HOD 1 time, 1 of 12 cycles  Administration: 1,100 mg (9/6/2024)      Chemotherapy    Treatment Summary   Plan Name: OP GYN PACLITAXEL CARBOPLATIN (AUC 6) Q3W  Treatment Goal: Palliative  Status: Inactive  Start Date: 2/28/2019  End Date: 6/18/2019  Provider: Will Trevizo Jr., MD  Chemotherapy: bevacizumab (AVASTIN) 15 mg/kg = 1,600 mg in sodium chloride 0.9% 100 mL chemo infusion, 15 mg/kg = 1,600 mg (100 % of original dose 15 mg/kg), Intravenous, Clinic/HOD 1 time, 6 of 6 cycles  Dose modification: 15 mg/kg (original dose 15 mg/kg, Cycle 1)  Administration: 1,600 mg (2/28/2019), 1,600 mg (3/21/2019), 1,600 mg (4/11/2019), 1,600 mg (5/7/2019), 1,600 mg (5/28/2019), 1,510 mg (6/18/2019)    CARBOplatin (PARAPLATIN) 870 mg in sodium chloride 0.9% 337 mL chemo infusion, 870 mg (100 % of original dose 868.8 mg), Intravenous, Clinic/[facility] 1 time, 6 of 6 cycles  Dose modification:   (original dose 868.8 mg, Cycle 1)  Administration: 870 mg (2/28/2019), 870 mg (3/21/2019), 900 mg (4/11/2019), 870 mg (5/7/2019), 660 mg (5/28/2019), 690 mg (6/18/2019)    PACLitaxel (TAXOL) 175 mg/m2 = 396 mg in sodium chloride 0.9% 566 mL chemo infusion, 175 mg/m2 = 396  mg, Intravenous, Clinic/HOD 1 time, 6 of 6 cycles  Dose modification: 140 mg/m2 (80 % of original dose 175 mg/m2, Cycle 5)  Administration: 396 mg (2/28/2019), 396 mg (3/21/2019), 396 mg (4/11/2019), 396 mg (5/7/2019), 318 mg (5/28/2019), 306 mg (6/18/2019)    Plan Name: OP BEVACIZUMAB Q3W   Treatment Goal: Maintenance  Status: Inactive  Start Date: 10/9/2019  End Date: 9/24/2020  Provider: Oscar Mckeon MD  Chemotherapy: bevacizumab (AVASTIN) 15 mg/kg = 1,615 mg in sodium chloride 0.9% 100 mL chemo infusion, 15 mg/kg = 1,615 mg, Intravenous, Clinic/HOD 1 time, 11 of 17 cycles  Administration: 1,615 mg (10/9/2019), 1,615 mg (10/29/2019), 1,615 mg (12/10/2019), 1,615 mg (1/21/2020), 1,600 mg (4/2/2020), 1,615 mg (4/23/2020), 1,600 mg (7/1/2020), 1,600 mg (7/22/2020), 1,600 mg (8/13/2020), 1,795 mg (9/3/2020), 1,795 mg (9/24/2020)    Plan Name: OP GYN PACLITAXEL CARBOPLATIN desensitization (AUC 5) Q3W  Treatment Goal: Control  Status: Inactive  Start Date: 9/29/2023  End Date: 2/14/2024  Provider: Ismael Juarez MD  Chemotherapy: CARBOplatin (PARAPLATIN) 650 mg in sodium chloride 0.9% 335 mL chemo infusion, 650 mg (100 % of original dose 648 mg), Intravenous, Clinic/HOD 1 time, 6 of 17 cycles  Dose modification:   (original dose 648 mg, Cycle 1),   (original dose 853.8 mg, Cycle 2),   (original dose 730 mg, Cycle 3),   (original dose 730 mg, Cycle 4), 5 mg (original dose 730 mg, Cycle 4), 7.3 mg (original dose 730 mg, Cycle 4), 5 mg (original dose 730 mg, Cycle 4, Reason: MD Discretion, Comment: desensitization), 73 mg (original dose 730 mg, Cycle 4), 720 mg (original dose 730 mg, Cycle 4, Reason: MD Discretion, Comment: desensitization), 648.97 mg (original dose 730 mg, Cycle 4, Reason: MD Discretion, Comment: desensitization), 0.61 mg (original dose 730 mg, Cycle 5, Reason: MD Discretion, Comment: desensitization), 5 mg (original dose 730 mg, Cycle 5, Reason: MD Discretion, Comment: desens), 60.5 mg (original dose  730 mg, Cycle 5, Reason: MD Discretion, Comment: desensitization),   (original dose 730 mg, Cycle 5, Reason: MD Discretion, Comment: new scr), 6.05 mg (original dose 730 mg, Cycle 5, Reason: Dose not tolerated, Comment: desensitization)  Administration: 650 mg (2023), 730 mg (11/10/2023), 710 mg (10/20/2023), 5 mg (1/3/2024), 5 mg (1/3/2024), 75 mg (1/3/2024), 650 mg (1/3/2024), 5 mg (2024), 5 mg (2024), 75 mg (2024), 650 mg (2024), 5 mg (2024), 60 mg (2024), 605 mg (2024), 5 mg (2024)    PACLitaxeL (TAXOL) 175 mg/m2 = 402 mg in sodium chloride 0.9% 500 mL chemo infusion, 175 mg/m2 = 402 mg, Intravenous, Clinic/HOD 1 time, 6 of 17 cycles  Administration: 402 mg (2023), 402 mg (10/20/2023), 408 mg (11/10/2023), 408 mg (1/3/2024), 408 mg (2024), 408 mg (2024)    Plan Name: OP GYN bevacizumab liposomal DOXOrubicin Q4W  Treatment Goal: Control  Status: Active  Start Date: 9/3/2024 (Planned)  End Date: 2025 (Planned)  Provider: Torri House MD  Chemotherapy: DOXOrubicin liposome (DOXIL) 92 mg in D5W 296 mL chemo infusion, 40 mg/m2 = 92 mg, Intravenous, Clinic/HOD 1 time, 0 of 12 cycles    bevacizumab-awwb (MVASI) 10 mg/kg = 1,125 mg in 0.9% NaCl 100 mL infusion, 10 mg/kg = 1,125 mg, Intravenous, Clinic/HOD 1 time, 0 of 12 cycles             Past Medical History:   Diagnosis Date    Anemia     Anxiety     Arthritis     knees    Cancer     ovarian    CHF (congestive heart failure)     Hx antineoplastic chemotherapy     last 2019    Hyperlipemia     Hypertension     Neck pain     Ovarian cancer 2019    CHEMO    Peritoneal carcinomatosis 2019       Past Surgical History:   Procedure Laterality Date    breast reduction  10/02/2018    CATARACT EXTRACTION Bilateral     2023     SECTION      X 1    COLONOSCOPY N/A 2021    Procedure: COLONOSCOPY;  Surgeon: Paulette Rojas MD;  Location: [facility];  Service: Gastroenterology;   Laterality: N/A;    CYSTOSCOPY W/ URETERAL STENT PLACEMENT Right 01/30/2019    Procedure: CYSTOSCOPY, WITH URETERAL STENT INSERTION;  Surgeon: Timmy Santiago IV, MD;  Location: [facility] OR;  Service: Urology;  Laterality: Right;    CYSTOSCOPY W/ URETERAL STENT REMOVAL  10/04/2019    DILATION AND CURETTAGE OF UTERUS      HYSTERECTOMY      RALH for fibroids (still has ovaries)    INSERTION OF VENOUS ACCESS PORT Left 02/18/2019    Procedure: INSERTION, VENOUS ACCESS PORT;  Surgeon: Ulisses Monzon MD;  Location: [facility] OR;  Service: General;  Laterality: Left;  Left internal jugular     LYSIS OF ADHESIONS OF URETER N/A 08/15/2019    Procedure: URETEROLYSIS;  Surgeon: Ismael Juarez MD;  Location: [facility] OR;  Service: OB/GYN;  Laterality: N/A;    OMENTECTOMY N/A 08/15/2019    Procedure: OMENTECTOMY;  Surgeon: Ismael Juarez MD;  Location: [facility];  Service: OB/GYN;  Laterality: N/A;    OOPHORECTOMY      RETROGRADE PYELOGRAPHY Right 01/30/2019    Procedure: PYELOGRAM, RETROGRADE;  Surgeon: Timmy Santiago IV, MD;  Location: [facility] OR;  Service: Urology;  Laterality: Right;    ROBOT-ASSISTED LAPAROSCOPIC SALPINGO-OOPHORECTOMY USING DA TIA XI Bilateral 08/15/2019    Procedure: XI ROBOTIC SALPINGO-OOPHORECTOMY;  Surgeon: Ismael Juarez MD;  Location: [facility];  Service: OB/GYN;  Laterality: Bilateral;    TOTAL REDUCTION MAMMOPLASTY  2018    TUBAL LIGATION         Family History   Problem Relation Name Age of Onset    Glaucoma Mother      No Known Problems Father      Colon cancer Brother      Breast cancer Maternal Aunt      Breast cancer Paternal Aunt      Ovarian cancer Paternal Aunt      Anesthesia problems Other cousin     Thrombophilia Neg Hx         Review of patient's allergies indicates:  No Known Allergies    Social History     Tobacco Use    Smoking status: Former     Current packs/day: 0.00     Average packs/day: 1 pack/day for 25.0 years (25.0 ttl pk-yrs)     Types: Cigarettes     Start date: 10/1/1993     Quit date:  10/1/2018     Years since quittin.9    Smokeless tobacco: Never    Tobacco comments:     States started quit 2 months ago after 30 years   Substance Use Topics    Alcohol use: Yes     Comment: occasionally  No alcohol 72h prior to sx    Drug use: No     Labs   Labs:  Lab Visit on 2024   Component Date Value Ref Range Status    Specimen UA 2024 Urine, Clean Catch   Final    Color, UA 2024 Yellow  Yellow, Straw, [place] Final    Appearance, UA 2024 Clear  Clear Final    pH, UA 2024 6.0  5.0 - 8.0 Final    Specific Gravity, UA 2024 1.020  1.005 - 1.030 Final    Protein, UA 2024 Trace (A)  Negative Final    Comment: Recommend a 24 hour urine protein or a urine   protein/creatinine ratio if globulin induced proteinuria is  clinically suspected.      Glucose, UA 2024 Negative  Negative Final    Ketones, UA 2024 Negative  Negative Final    Bilirubin (UA) 2024 Negative  Negative Final    Occult Blood UA 2024 Negative  Negative Final    Nitrite, UA 2024 Negative  Negative Final    Urobilinogen, UA 2024 2.0-3.0 (A)  <2.0 EU/dL Final    Leukocytes, UA 2024 Negative  Negative Final   Lab Visit on 2024   Component Date Value Ref Range Status    Phosphorus 2024 3.5  2.7 - 4.5 mg/dL Final    Magnesium 2024 1.7  1.6 - 2.6 mg/dL Final    Sodium 2024 143  136 - 145 mmol/L Final    Potassium 2024 4.0  3.5 - 5.1 mmol/L Final    Chloride 2024 107  95 - 110 mmol/L Final    CO2 2024 28  23 - 29 mmol/L Final    Glucose 2024 97  70 - 110 mg/dL Final    BUN 2024 23  8 - 23 mg/dL Final    Creatinine 2024 1.2  0.5 - 1.4 mg/dL Final    Calcium 2024 9.7  8.7 - 10.5 mg/dL Final    Total Protein 2024 7.2  6.0 - 8.4 g/dL Final    Albumin 2024 3.2 (L)  3.5 - 5.2 g/dL Final    Total Bilirubin 2024 0.5  0.1 - 1.0 mg/dL Final    Comment: For infants and newborns, interpretation of  results should be based  on gestational age, weight and in agreement with clinical  observations.    Premature Infant recommended reference ranges:  Up to 24 hours.............<8.0 mg/dL  Up to 48 hours............<12.0 mg/dL  3-5 days..................<15.0 mg/dL  6-29 days.................<15.0 mg/dL      Alkaline Phosphatase 09/18/2024 147 (H)  55 - 135 U/L Final    AST 09/18/2024 107 (H)  10 - 40 U/L Final    ALT 09/18/2024 83 (H)  10 - 44 U/L Final    eGFR 09/18/2024 49 (A)  >60 mL/min/1.73 m^2 Final    Anion Gap 09/18/2024 8  8 - 16 mmol/L Final    WBC 09/18/2024 4.99  3.90 - 12.70 K/uL Final    RBC 09/18/2024 4.21  4.00 - 5.40 M/uL Final    Hemoglobin 09/18/2024 11.6 (L)  12.0 - 16.0 g/dL Final    Hematocrit 09/18/2024 37.2  37.0 - 48.5 % Final    MCV 09/18/2024 88  82 - 98 fL Final    MCH 09/18/2024 27.6  27.0 - 31.0 pg Final    MCHC 09/18/2024 31.2 (L)  32.0 - 36.0 g/dL Final    RDW 09/18/2024 16.2 (H)  11.5 - 14.5 % Final    Platelets 09/18/2024 269  150 - 450 K/uL Final    MPV 09/18/2024 9.9  9.2 - 12.9 fL Final    Immature Granulocytes 09/18/2024 0.2  0.0 - 0.5 % Final    Gran # (ANC) 09/18/2024 3.6  1.8 - 7.7 K/uL Final    Immature Grans (Abs) 09/18/2024 0.01  0.00 - 0.04 K/uL Final    Comment: Mild elevation in immature granulocytes is non specific and   can be seen in a variety of conditions including stress response,   acute inflammation, trauma and pregnancy. Correlation with other   laboratory and clinical findings is essential.      Lymph # 09/18/2024 1.1  1.0 - 4.8 K/uL Final    Mono # 09/18/2024 0.2 (L)  0.3 - 1.0 K/uL Final    Eos # 09/18/2024 0.1  0.0 - 0.5 K/uL Final    Baso # 09/18/2024 0.07  0.00 - 0.20 K/uL Final    nRBC 09/18/2024 0  0 /100 WBC Final    Gran % 09/18/2024 72.2  38.0 - 73.0 % Final    Lymph % 09/18/2024 21.4  18.0 - 48.0 % Final    Mono % 09/18/2024 3.4 (L)  4.0 - 15.0 % Final    Eosinophil % 09/18/2024 1.4  0.0 - 8.0 % Final    Basophil % 09/18/2024 1.4  0.0 - 1.9 % Final     Differential Method 09/18/2024 Automated   Final        Imaging   CT Chest Abdomen Pelvis With IV Contrast (XPD) NO Oral Contrast 12/12/2023  Details    Reading Physician Reading Date Result Priority   Michele Kim MD  [phone] 12/12/2023 STAT     Narrative & Impression  EXAMINATION:  CT CHEST ABDOMEN PELVIS WITH IV CONTRAST (XPD)     CLINICAL HISTORY:  Hypokalemia,follow up ovarian cancer on treatment; evaluate treatment response for subsequent treatment planning.     TECHNIQUE:  Axial CT imaging was performed through the chest, abdomen and pelvis with  100cc  of intravenous contrast. Multiplanar reformats were performed and interpreted.     COMPARISON:  08/21/2023, 09/29/2022 and 09/24/2020     FINDINGS:  Chest:     The heart, great vessels, and mediastinal structures are within normal limits. No thoracic adenopathy.  Left-sided MediPort in the SVC.  Aortic atherosclerosis.     The lungs are clear bilaterally. No pleural effusions.     Abdomen:     The liver, spleen, kidneys, adrenal glands are within normal limits.  Stable 10 mm uncinate process pancreatic cyst.  No biliary ductal dilatation.     The gallbladder is unremarkable.     No free fluid, free air, or inflammatory change.     The small bowel is within normal limits.  Colonic diverticulosis.  Supraumbilical abdominal wall hernia containing a nondistended segment of the transverse colon.  No evidence of strangulation or incarceration.  The appendix is normal.     Aortic atherosclerosis without evidence of aneurysm.     Pelvis:     The urinary bladder is unremarkable. The uterus has been removed.  No free pelvic fluid or adenopathy.     No significant osseous abnormality is identified.  No suspicious osseous lesions.     Impression:     No CT evidence of recurrent or metastatic disease in the chest, abdomen or pelvis.     All CT scans at this facility are performed  using dose modulation techniques as appropriate to performed exam including the  following:  automated exposure control; adjustment of mA and/or kV according to the patients size (this includes techniques or standardized protocols for targeted exams where dose is matched to indication/reason for exam: i.e. extremities or head);  iterative reconstruction technique.        NM PET CT FDG SKULL BASE TO MID THIGH - 02/26/2024  FINDINGS:  Head/neck: There is normal physiologic uptake noted within the visualized brain parenchyma. No FDG avid adenopathy within the neck.     Chest: No FDG avid pulmonary nodules/masses. No FDG avid mediastinal, hilar or axillary lymph nodes.A left-sided MediPort catheter is noted.     Abdomen/Pelvis: Normal physiologic uptake noted within the liver, spleen, urinary tract, and bowel.The adrenals are unremarkable.  There are a couple of left periaortic retroperitoneal lymph nodes that demonstrate an SUV max of up to 2.8 and 3.1.  Findings are nonspecific.  There is diverticulosis seen scattered throughout the colon.  The midportion of the transverse colon is contained within a midline ventral hernia.  No evidence for bowel obstruction..     Skeletal: No FDG avid osseus lesions identified.     Impression:  1. Low level nonspecific uptake associated with a couple of left periaortic retroperitoneal lymph nodes.  It is possible these lymph nodes may just be reactive in nature.  Continued follow-up recommended.      NM PET CT FDG SKULL BASE TO MID THIGH - 04/08/2024  FINDINGS:  Quality of the study: Adequate.     SUV max of the liver parenchyma is 3.7     Neck: No abnormal FDG avidity.  No lymphadenopathy or mass.     Chest: No FDG avid mediastinal, hilar, or axillary lymphadenopathy.  No pleural effusion.  No pulmonary nodule.     Abdomen/pelvis: Interval resolution of the low level FDG avidity of the left para-aortic lymph nodes.  Lymph nodes are also smaller compared to the prior exam and normal in size on this exam.  No abnormal FDG avidity.  No ascites.  No  lymphadenopathy     Skeletal structures: No abnormal FDG avidity.  No focal lytic or sclerotic lesion in the osseous structures.     Physiologic uptake of the tracer is present within the brain, salivary glands, myocardium, GI and  tracts.     Incidental CT findings: No interval change in the umbilical hernia containing a loop of colon.  Left IJ MediPort with its tip in the SVC.    Impression:  No FDG PET/CT findings to suggest recurrent or metastatic disease.  Interval resolution of the low level FDG avidity seen in the left para-aortic retroperitoneal lymph nodes on the prior exam.      NM PET CT FDG SKULL BASE TO MID THIGH - 07/02/2024  CLINICAL HISTORY:  surveillance; Drug-induced polyneuropathy     TECHNIQUE:  13.71 mCi of F18-FDG was administered intravenously.  After an approximately 60 min distribution time, PET/CT images were acquired from the skull base to the mid thigh. Transmission images were acquired to correct for attenuation using a whole body low-dose CT scan without contrast.     COMPARISON:  Prior PET CTs, most recent 04/08/2024; CT abdomen/pelvis 05/29/2024     FINDINGS:  Head/neck: New right supraclavicular/neck base FDG avid lymph nodes, SUV max 11.  Otherwise normal physiologic activity.     Chest: New enlarged left subpectoral FDG avid lymph node, SUV max 11.  No FDG avid right axillary adenopathy.  New mediastinal FDG avid adenopathy (paratracheal, prevascular subcarinal).  SUV max 15 (pre-vascular).  New left internal mammary FDG avid lymph node, SUV max 6.5.  New anterior pericardial FDG avid nodule, axial fused image 175.  New left paraspinal FDG avid nodule, axial fused image 176.     Trace bilateral pleural effusions.  No FDG avid pulmonary nodule appreciated.     Abdomen/pelvis: Small FDG avid left upper quadrant omental nodularity/thickening, SUV max 3.5 (axial fused image 182 through 210).  Left pericolic gutter FDG avid soft tissue nodule, SUV max 9 (axial fused image 273).   Suspected small FDG avid nodules/lymph nodes closely associated with small bowel loops within the central upper pelvis, axial fused image 301 and 316.  FDG avid right iliac and  lymph nodes (axial fused image 324 and 331), SUV max 6.4.     Trace nonspecific pelvic free fluid.  Colonic diverticulosis.  Bowel containing anterior ventral hernia as noted on prior CT.     Skeletal/subcutaneous structures: No suspicious FDG avid osseous abnormality.     Impression:  Disease progression with neck base/chest, abdomen and pelvis FDG avid disease now present as above.     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  Patient was then treated with Avastin maintenance  08/21/2023 CT CAP: Marked interval disease progression with severe peritoneal carcinomatosis as detailed above.  12/12/2023 CT CAP: No CT evidence of recurrent or metastatic disease in the chest, abdomen or pelvis.  : 7 >> 21 >> 32 >> 64 >> 11  Started Carbo/Taxol 09/29/2023  C1 no complications  C2 and C3 (11/10/23): reaction consisting of feeling presyncopal, SOB, sweating; vitals significant for hypoxia and hypotension; evaluated by AI and recs for desensitization  C4 -C6with desensitization  Per medical record, patient declined treatment until after the new year  CT CAP 12/12/23 showed CLYDE  PET-CT 02/26/24: Low level nonspecific uptake associated with a couple of left periaortic retroperitoneal lymph nodes.  It is possible these lymph nodes may just be reactive in nature  PET-CT 04/08/2024:  No FDG PET/CT findings to suggest recurrent or metastatic disease. Interval resolution of the low level FDG avidity seen in the left  para-aortic retroperitoneal lymph nodes on the prior exam.   PET-CT 07/02/24: Disease progression with neck base/chest, abdomen and pelvis FDG avid disease now present as above.   Presented in Gyn/Onc tumor board 07/31/24 with consensus for FRa testing and proceed with mirvetuximab if positive and if negative proceed with Doxil  Plan  Tumor NGS/Blood NGS/FRa testing negative  Labs reviewed and LFTs increasing; hold Soursoup  Will plan for repeat imaging after C3 of Doxorubicin      Difficulty Swallowing  Recommend swallowing evaluation   SLP referral placed      Chronic Medical Conditions  Osteoarthritis  HTN  Anxiety        Med Onc Chart Routing      Follow up with physician 6 weeks.   Follow up with [facility] 2 weeks and 4 weeks.   Infusion scheduling note   Bevacizumab tomorrow and Doxorubicin/Bevacizumab in 2 weeks   Injection scheduling note    Labs CMP, CBC, magnesium, phosphorus and urinalysis   Scheduling:  Preferred lab:  Lab interval:     Imaging    Pharmacy appointment    Other referrals       Additional referrals needed  SLP           The patient was seen, interviewed and examined. Pertinent lab and radiologic studies were reviewed. Pt instructed to call should they develop concerning signs/symptoms or have further questions.        Portions of the record may have been created with voice recognition software. Occasional wrong-word or sound-a-likez substitutions may have occurred due to the inherent limitations of voice recognition software. Read the chart carefully and recognize, using context, where substitutions have occurred.      Torri Pugh MD    Hematology/Oncology

## 2024-09-20 ENCOUNTER — TELEPHONE (OUTPATIENT)
Dept: HEMATOLOGY/ONCOLOGY | Facility: CLINIC | Age: 69
End: 2024-09-20
Payer: MEDICARE

## 2024-09-20 ENCOUNTER — INFUSION (OUTPATIENT)
Dept: INFUSION THERAPY | Facility: HOSPITAL | Age: 69
End: 2024-09-20
Attending: INTERNAL MEDICINE
Payer: MEDICARE

## 2024-09-20 VITALS
OXYGEN SATURATION: 96 % | RESPIRATION RATE: 16 BRPM | WEIGHT: 232.25 LBS | DIASTOLIC BLOOD PRESSURE: 57 MMHG | HEIGHT: 67 IN | SYSTOLIC BLOOD PRESSURE: 112 MMHG | BODY MASS INDEX: 36.45 KG/M2 | TEMPERATURE: 98 F | HEART RATE: 73 BPM

## 2024-09-20 DIAGNOSIS — C56.3 OVARIAN CANCER, BILATERAL: Primary | ICD-10-CM

## 2024-09-20 DIAGNOSIS — C78.6 PERITONEAL CARCINOMATOSIS: ICD-10-CM

## 2024-09-20 PROCEDURE — 25000003 PHARM REV CODE 250: Mod: HCNC | Performed by: INTERNAL MEDICINE

## 2024-09-20 PROCEDURE — 96413 CHEMO IV INFUSION 1 HR: CPT | Mod: HCNC

## 2024-09-20 PROCEDURE — 63600175 PHARM REV CODE 636 W HCPCS: Mod: JZ,JG,HCNC | Performed by: INTERNAL MEDICINE

## 2024-09-20 RX ORDER — HEPARIN 100 UNIT/ML
500 SYRINGE INTRAVENOUS
Status: DISCONTINUED | OUTPATIENT
Start: 2024-09-20 | End: 2024-09-20 | Stop reason: HOSPADM

## 2024-09-20 RX ADMIN — BEVACIZUMAB-AWWB 1100 MG: 400 INJECTION, SOLUTION INTRAVENOUS at 10:09

## 2024-09-20 RX ADMIN — HEPARIN 500 UNITS: 100 SYRINGE at 10:09

## 2024-09-20 RX ADMIN — SODIUM CHLORIDE: 9 INJECTION, SOLUTION INTRAVENOUS at 10:09

## 2024-09-20 NOTE — PLAN OF CARE
Problem: Adult Inpatient Plan of Care  Goal: Plan of Care Review  Outcome: Progressing  Flowsheets (Taken 9/20/2024 1000)  Plan of Care Reviewed With:   patient   family  Goal: Patient-Specific Goal (Individualized)  Outcome: Progressing  Flowsheets (Taken 9/20/2024 1000)  Individualized Care Needs: warm blanket, pillow, reclining position, ice to mediport site, ice/diet sprite  Anxieties, Fears or Concerns:   pt concerned about not being able to eat   pt informed md orders for speech therapy     Problem: Infection  Goal: Absence of Infection Signs and Symptoms  Outcome: Progressing  Intervention: Prevent or Manage Infection  Flowsheets (Taken 9/20/2024 1227)  Infection Management: aseptic technique maintained

## 2024-09-20 NOTE — TELEPHONE ENCOUNTER
12:39 pm - Swer received call from pt today ([phone]). Pt requesting referral for boost assistance through [facility]. Pt aware referral requires significant weight loss. Swer to pull referral and have MD/dietitian review. Swer reported to return pt call once this is complete. Swer will remain available.     1:39 pm - Swer faxed pt referral to [facility] and returned pt call to provide update. Pt had no other needs at this time. Swer will remain available.

## 2024-09-30 ENCOUNTER — TELEPHONE (OUTPATIENT)
Dept: PULMONOLOGY | Facility: CLINIC | Age: 69
End: 2024-09-30
Payer: MEDICARE

## 2024-09-30 ENCOUNTER — HOSPITAL ENCOUNTER (OUTPATIENT)
Dept: RADIOLOGY | Facility: HOSPITAL | Age: 69
Discharge: HOME | End: 2024-09-30
Attending: INTERNAL MEDICINE
Payer: MEDICARE

## 2024-09-30 DIAGNOSIS — R13.10 DYSPHAGIA, UNSPECIFIED TYPE: ICD-10-CM

## 2024-09-30 NOTE — TELEPHONE ENCOUNTER
Attempt to contact pt in regards to 02/24 appt being rescheduled due to provider being out. Left vm to contact office if rescheduled appt does not work.

## 2024-10-02 ENCOUNTER — HOSPITAL ENCOUNTER (INPATIENT)
Facility: HOSPITAL | Age: 69
LOS: 4 days | Discharge: HOME | DRG: 194 | End: 2024-10-07
Attending: EMERGENCY MEDICINE | Admitting: HOSPITALIST
Payer: MEDICARE

## 2024-10-02 ENCOUNTER — LAB VISIT (OUTPATIENT)
Dept: LAB | Facility: HOSPITAL | Age: 69
End: 2024-10-02
Attending: INTERNAL MEDICINE
Payer: MEDICARE

## 2024-10-02 DIAGNOSIS — C56.3 OVARIAN CANCER, BILATERAL: ICD-10-CM

## 2024-10-02 DIAGNOSIS — R07.9 CHEST PAIN: ICD-10-CM

## 2024-10-02 DIAGNOSIS — C78.6 PERITONEAL CARCINOMATOSIS: ICD-10-CM

## 2024-10-02 DIAGNOSIS — J18.9 CAP (COMMUNITY ACQUIRED PNEUMONIA): ICD-10-CM

## 2024-10-02 DIAGNOSIS — D84.9 IMMUNOCOMPROMISED STATE: Primary | ICD-10-CM

## 2024-10-02 DIAGNOSIS — R06.02 SHORTNESS OF BREATH: ICD-10-CM

## 2024-10-02 DIAGNOSIS — Z85.43 HISTORY OF OVARIAN CANCER: ICD-10-CM

## 2024-10-02 DIAGNOSIS — J96.01 ACUTE RESPIRATORY FAILURE WITH HYPOXIA: ICD-10-CM

## 2024-10-02 LAB
BASOPHILS # BLD AUTO: 0.08 K/UL (ref 0–0.2)
BASOPHILS NFR BLD: 2.2 % (ref 0–1.9)
BILIRUB UR QL STRIP: NEGATIVE
BNP SERPL-MCNC: 14 PG/ML (ref 0–99)
CLARITY UR: CLEAR
COLOR UR: YELLOW
DIFFERENTIAL METHOD BLD: ABNORMAL
EOSINOPHIL # BLD AUTO: 0 K/UL (ref 0–0.5)
EOSINOPHIL NFR BLD: 0.5 % (ref 0–8)
ERYTHROCYTE [DISTWIDTH] IN BLOOD BY AUTOMATED COUNT: 18.4 % (ref 11.5–14.5)
GLUCOSE UR QL STRIP: NEGATIVE
HCT VFR BLD AUTO: 35.3 % (ref 37–48.5)
HGB BLD-MCNC: 11.2 G/DL (ref 12–16)
HGB UR QL STRIP: NEGATIVE
IMM GRANULOCYTES # BLD AUTO: 0.01 K/UL (ref 0–0.04)
IMM GRANULOCYTES NFR BLD AUTO: 0.3 % (ref 0–0.5)
KETONES UR QL STRIP: NEGATIVE
LEUKOCYTE ESTERASE UR QL STRIP: NEGATIVE
LYMPHOCYTES # BLD AUTO: 1 K/UL (ref 1–4.8)
LYMPHOCYTES NFR BLD: 25.7 % (ref 18–48)
MCH RBC QN AUTO: 28.2 PG (ref 27–31)
MCHC RBC AUTO-ENTMCNC: 31.7 G/DL (ref 32–36)
MCV RBC AUTO: 89 FL (ref 82–98)
MONOCYTES # BLD AUTO: 0.9 K/UL (ref 0.3–1)
MONOCYTES NFR BLD: 23.2 % (ref 4–15)
NEUTROPHILS # BLD AUTO: 1.8 K/UL (ref 1.8–7.7)
NEUTROPHILS NFR BLD: 48.1 % (ref 38–73)
NITRITE UR QL STRIP: NEGATIVE
NRBC BLD-RTO: 0 /100 WBC
PH UR STRIP: 6 [PH] (ref 5–8)
PLATELET # BLD AUTO: 330 K/UL (ref 150–450)
PMV BLD AUTO: 9.9 FL (ref 9.2–12.9)
PROT UR QL STRIP: ABNORMAL
RBC # BLD AUTO: 3.97 M/UL (ref 4–5.4)
SP GR UR STRIP: 1.02 (ref 1–1.03)
URN SPEC COLLECT METH UR: ABNORMAL
WBC # BLD AUTO: 3.7 K/UL (ref 3.9–12.7)

## 2024-10-02 PROCEDURE — 63600175 PHARM REV CODE 636 W HCPCS: Mod: HCNC | Performed by: EMERGENCY MEDICINE

## 2024-10-02 PROCEDURE — 84484 ASSAY OF TROPONIN QUANT: CPT | Mod: HCNC | Performed by: EMERGENCY MEDICINE

## 2024-10-02 PROCEDURE — 96374 THER/PROPH/DIAG INJ IV PUSH: CPT | Mod: HCNC

## 2024-10-02 PROCEDURE — 99285 EMERGENCY DEPT VISIT HI MDM: CPT | Mod: 25,HCNC

## 2024-10-02 PROCEDURE — 85025 COMPLETE CBC W/AUTO DIFF WBC: CPT | Mod: 91,HCNC | Performed by: EMERGENCY MEDICINE

## 2024-10-02 PROCEDURE — 83880 ASSAY OF NATRIURETIC PEPTIDE: CPT | Mod: HCNC | Performed by: EMERGENCY MEDICINE

## 2024-10-02 PROCEDURE — 25500020 PHARM REV CODE 255: Mod: HCNC | Performed by: EMERGENCY MEDICINE

## 2024-10-02 PROCEDURE — 93010 ELECTROCARDIOGRAM REPORT: CPT | Mod: HCNC,,, | Performed by: INTERNAL MEDICINE

## 2024-10-02 PROCEDURE — 93005 ELECTROCARDIOGRAM TRACING: CPT | Mod: HCNC

## 2024-10-02 PROCEDURE — 81003 URINALYSIS AUTO W/O SCOPE: CPT | Mod: HCNC | Performed by: INTERNAL MEDICINE

## 2024-10-02 PROCEDURE — 80053 COMPREHEN METABOLIC PANEL: CPT | Mod: 91,HCNC | Performed by: EMERGENCY MEDICINE

## 2024-10-02 RX ORDER — FUROSEMIDE 10 MG/ML
80 INJECTION INTRAMUSCULAR; INTRAVENOUS
Status: COMPLETED | OUTPATIENT
Start: 2024-10-02 | End: 2024-10-02

## 2024-10-02 RX ADMIN — IOHEXOL 100 ML: 350 INJECTION, SOLUTION INTRAVENOUS at 10:10

## 2024-10-02 RX ADMIN — FUROSEMIDE 80 MG: 10 INJECTION, SOLUTION INTRAMUSCULAR; INTRAVENOUS at 10:10

## 2024-10-02 NOTE — Clinical Note
Diagnosis: CAP (community acquired pneumonia) [725282]   Reason for IP Medical Treatment  (Clinical interventions that can only be accomplished in the IP setting? ) :: =

## 2024-10-03 ENCOUNTER — TELEPHONE (OUTPATIENT)
Dept: HEMATOLOGY/ONCOLOGY | Facility: CLINIC | Age: 69
End: 2024-10-03
Payer: MEDICARE

## 2024-10-03 PROBLEM — J18.9 CAP (COMMUNITY ACQUIRED PNEUMONIA): Status: ACTIVE | Noted: 2024-10-03

## 2024-10-03 PROBLEM — I50.9 CHF (CONGESTIVE HEART FAILURE): Status: ACTIVE | Noted: 2024-10-03

## 2024-10-03 PROBLEM — R74.01 TRANSAMINITIS: Status: ACTIVE | Noted: 2024-10-03

## 2024-10-03 PROBLEM — R09.02 HYPOXIA: Status: ACTIVE | Noted: 2024-10-03

## 2024-10-03 LAB
ADENOVIRUS: NOT DETECTED
ALBUMIN SERPL BCP-MCNC: 2.7 G/DL (ref 3.5–5.2)
ALBUMIN SERPL BCP-MCNC: 2.8 G/DL (ref 3.5–5.2)
ALP SERPL-CCNC: 288 U/L (ref 55–135)
ALP SERPL-CCNC: 311 U/L (ref 55–135)
ALT SERPL W/O P-5'-P-CCNC: 101 U/L (ref 10–44)
ALT SERPL W/O P-5'-P-CCNC: 93 U/L (ref 10–44)
ANION GAP SERPL CALC-SCNC: 11 MMOL/L (ref 8–16)
ANION GAP SERPL CALC-SCNC: 9 MMOL/L (ref 8–16)
AST SERPL-CCNC: 176 U/L (ref 10–40)
AST SERPL-CCNC: 190 U/L (ref 10–40)
BASOPHILS # BLD AUTO: 0.09 K/UL (ref 0–0.2)
BASOPHILS NFR BLD: 2.5 % (ref 0–1.9)
BILIRUB SERPL-MCNC: 0.7 MG/DL (ref 0.1–1)
BILIRUB SERPL-MCNC: 0.7 MG/DL (ref 0.1–1)
BORDETELLA PARAPERTUSSIS (IS1001): NOT DETECTED
BORDETELLA PERTUSSIS (PTXP): NOT DETECTED
BUN SERPL-MCNC: 26 MG/DL (ref 8–23)
BUN SERPL-MCNC: 27 MG/DL (ref 8–23)
CALCIUM SERPL-MCNC: 8.8 MG/DL (ref 8.7–10.5)
CALCIUM SERPL-MCNC: 9 MG/DL (ref 8.7–10.5)
CHLAMYDIA PNEUMONIAE: NOT DETECTED
CHLORIDE SERPL-SCNC: 105 MMOL/L (ref 95–110)
CHLORIDE SERPL-SCNC: 106 MMOL/L (ref 95–110)
CO2 SERPL-SCNC: 24 MMOL/L (ref 23–29)
CO2 SERPL-SCNC: 24 MMOL/L (ref 23–29)
CORONAVIRUS 229E, COMMON COLD VIRUS: NOT DETECTED
CORONAVIRUS HKU1, COMMON COLD VIRUS: NOT DETECTED
CORONAVIRUS NL63, COMMON COLD VIRUS: NOT DETECTED
CORONAVIRUS OC43, COMMON COLD VIRUS: NOT DETECTED
CREAT SERPL-MCNC: 1.1 MG/DL (ref 0.5–1.4)
CREAT SERPL-MCNC: 1.2 MG/DL (ref 0.5–1.4)
DIFFERENTIAL METHOD BLD: ABNORMAL
EOSINOPHIL # BLD AUTO: 0 K/UL (ref 0–0.5)
EOSINOPHIL NFR BLD: 0.8 % (ref 0–8)
ERYTHROCYTE [DISTWIDTH] IN BLOOD BY AUTOMATED COUNT: 18.7 % (ref 11.5–14.5)
EST. GFR  (NO RACE VARIABLE): 49 ML/MIN/1.73 M^2
EST. GFR  (NO RACE VARIABLE): 54 ML/MIN/1.73 M^2
FLUBV RNA NPH QL NAA+NON-PROBE: NOT DETECTED
GLUCOSE SERPL-MCNC: 76 MG/DL (ref 70–110)
GLUCOSE SERPL-MCNC: 78 MG/DL (ref 70–110)
HCT VFR BLD AUTO: 38 % (ref 37–48.5)
HGB BLD-MCNC: 11.8 G/DL (ref 12–16)
HPIV1 RNA NPH QL NAA+NON-PROBE: NOT DETECTED
HPIV2 RNA NPH QL NAA+NON-PROBE: NOT DETECTED
HPIV3 RNA NPH QL NAA+NON-PROBE: NOT DETECTED
HPIV4 RNA NPH QL NAA+NON-PROBE: NOT DETECTED
HUMAN METAPNEUMOVIRUS: NOT DETECTED
IMM GRANULOCYTES # BLD AUTO: 0.01 K/UL (ref 0–0.04)
IMM GRANULOCYTES NFR BLD AUTO: 0.3 % (ref 0–0.5)
INFLUENZA A (SUBTYPES H1,H1-2009,H3): NOT DETECTED
LYMPHOCYTES # BLD AUTO: 1 K/UL (ref 1–4.8)
LYMPHOCYTES NFR BLD: 26 % (ref 18–48)
MCH RBC QN AUTO: 28 PG (ref 27–31)
MCHC RBC AUTO-ENTMCNC: 31.1 G/DL (ref 32–36)
MCV RBC AUTO: 90 FL (ref 82–98)
MONOCYTES # BLD AUTO: 0.8 K/UL (ref 0.3–1)
MONOCYTES NFR BLD: 22.7 % (ref 4–15)
MYCOPLASMA PNEUMONIAE: NOT DETECTED
NEUTROPHILS # BLD AUTO: 1.8 K/UL (ref 1.8–7.7)
NEUTROPHILS NFR BLD: 47.7 % (ref 38–73)
NRBC BLD-RTO: 0 /100 WBC
OHS QRS DURATION: 70 MS
OHS QTC CALCULATION: 441 MS
PLATELET # BLD AUTO: 327 K/UL (ref 150–450)
PMV BLD AUTO: 10.6 FL (ref 9.2–12.9)
POCT GLUCOSE: 86 MG/DL (ref 70–110)
POTASSIUM SERPL-SCNC: 3.8 MMOL/L (ref 3.5–5.1)
POTASSIUM SERPL-SCNC: 3.8 MMOL/L (ref 3.5–5.1)
PROT SERPL-MCNC: 6.2 G/DL (ref 6–8.4)
PROT SERPL-MCNC: 6.6 G/DL (ref 6–8.4)
RBC # BLD AUTO: 4.22 M/UL (ref 4–5.4)
RESPIRATORY INFECTION PANEL SOURCE: NORMAL
RSV RNA NPH QL NAA+NON-PROBE: NOT DETECTED
RV+EV RNA NPH QL NAA+NON-PROBE: NOT DETECTED
SARS-COV-2 RNA RESP QL NAA+PROBE: NOT DETECTED
SODIUM SERPL-SCNC: 139 MMOL/L (ref 136–145)
SODIUM SERPL-SCNC: 140 MMOL/L (ref 136–145)
TROPONIN I SERPL DL<=0.01 NG/ML-MCNC: <0.006 NG/ML (ref 0–0.03)
WBC # BLD AUTO: 3.66 K/UL (ref 3.9–12.7)

## 2024-10-03 PROCEDURE — 87040 BLOOD CULTURE FOR BACTERIA: CPT | Mod: HCNC | Performed by: EMERGENCY MEDICINE

## 2024-10-03 PROCEDURE — 99900035 HC TECH TIME PER 15 MIN (STAT): Mod: HCNC

## 2024-10-03 PROCEDURE — 25000003 PHARM REV CODE 250: Mod: HCNC | Performed by: EMERGENCY MEDICINE

## 2024-10-03 PROCEDURE — 80053 COMPREHEN METABOLIC PANEL: CPT | Mod: HCNC | Performed by: NURSE PRACTITIONER

## 2024-10-03 PROCEDURE — 99232 SBSQ HOSP IP/OBS MODERATE 35: CPT | Mod: HCNC,,, | Performed by: INTERNAL MEDICINE

## 2024-10-03 PROCEDURE — 87798 DETECT AGENT NOS DNA AMP: CPT | Mod: HCNC | Performed by: NURSE PRACTITIONER

## 2024-10-03 PROCEDURE — 87581 M.PNEUMON DNA AMP PROBE: CPT | Mod: HCNC | Performed by: NURSE PRACTITIONER

## 2024-10-03 PROCEDURE — 25000003 PHARM REV CODE 250: Mod: HCNC | Performed by: NURSE PRACTITIONER

## 2024-10-03 PROCEDURE — 94761 N-INVAS EAR/PLS OXIMETRY MLT: CPT | Mod: HCNC

## 2024-10-03 PROCEDURE — 63600175 PHARM REV CODE 636 W HCPCS: Mod: HCNC | Performed by: NURSE PRACTITIONER

## 2024-10-03 PROCEDURE — 94799 UNLISTED PULMONARY SVC/PX: CPT | Mod: HCNC

## 2024-10-03 PROCEDURE — 85025 COMPLETE CBC W/AUTO DIFF WBC: CPT | Mod: HCNC | Performed by: NURSE PRACTITIONER

## 2024-10-03 PROCEDURE — 11000001 HC ACUTE MED/SURG PRIVATE ROOM: Mod: HCNC

## 2024-10-03 PROCEDURE — 82962 GLUCOSE BLOOD TEST: CPT | Mod: HCNC

## 2024-10-03 PROCEDURE — 27000221 HC OXYGEN, UP TO 24 HOURS: Mod: HCNC

## 2024-10-03 RX ORDER — ONDANSETRON HYDROCHLORIDE 2 MG/ML
4 INJECTION, SOLUTION INTRAVENOUS EVERY 8 HOURS PRN
Status: DISCONTINUED | OUTPATIENT
Start: 2024-10-03 | End: 2024-10-07 | Stop reason: HOSPADM

## 2024-10-03 RX ORDER — IBUPROFEN 200 MG
24 TABLET ORAL
Status: DISCONTINUED | OUTPATIENT
Start: 2024-10-03 | End: 2024-10-07 | Stop reason: HOSPADM

## 2024-10-03 RX ORDER — SODIUM CHLORIDE 0.9 % (FLUSH) 0.9 %
3 SYRINGE (ML) INJECTION EVERY 12 HOURS PRN
Status: DISCONTINUED | OUTPATIENT
Start: 2024-10-03 | End: 2024-10-07 | Stop reason: HOSPADM

## 2024-10-03 RX ORDER — NALOXONE HCL 0.4 MG/ML
0.02 VIAL (ML) INJECTION
Status: DISCONTINUED | OUTPATIENT
Start: 2024-10-03 | End: 2024-10-07 | Stop reason: HOSPADM

## 2024-10-03 RX ORDER — IPRATROPIUM BROMIDE AND ALBUTEROL SULFATE 2.5; .5 MG/3ML; MG/3ML
3 SOLUTION RESPIRATORY (INHALATION) EVERY 4 HOURS PRN
Status: DISCONTINUED | OUTPATIENT
Start: 2024-10-03 | End: 2024-10-07 | Stop reason: HOSPADM

## 2024-10-03 RX ORDER — ALPRAZOLAM 0.25 MG/1
0.25 TABLET ORAL 3 TIMES DAILY PRN
Status: DISCONTINUED | OUTPATIENT
Start: 2024-10-03 | End: 2024-10-07 | Stop reason: HOSPADM

## 2024-10-03 RX ORDER — SODIUM CHLORIDE, SODIUM LACTATE, POTASSIUM CHLORIDE, CALCIUM CHLORIDE 600; 310; 30; 20 MG/100ML; MG/100ML; MG/100ML; MG/100ML
INJECTION, SOLUTION INTRAVENOUS CONTINUOUS
Status: DISCONTINUED | OUTPATIENT
Start: 2024-10-03 | End: 2024-10-04

## 2024-10-03 RX ORDER — ACETAMINOPHEN 325 MG/1
650 TABLET ORAL EVERY 8 HOURS PRN
Status: DISCONTINUED | OUTPATIENT
Start: 2024-10-03 | End: 2024-10-03

## 2024-10-03 RX ORDER — PROMETHAZINE HYDROCHLORIDE 25 MG/1
25 TABLET ORAL EVERY 6 HOURS PRN
Status: DISCONTINUED | OUTPATIENT
Start: 2024-10-03 | End: 2024-10-07 | Stop reason: HOSPADM

## 2024-10-03 RX ORDER — GLUCAGON 1 MG
1 KIT INJECTION
Status: DISCONTINUED | OUTPATIENT
Start: 2024-10-03 | End: 2024-10-07 | Stop reason: HOSPADM

## 2024-10-03 RX ORDER — POTASSIUM CHLORIDE 20 MEQ/1
20 TABLET, EXTENDED RELEASE ORAL 2 TIMES DAILY
Status: DISCONTINUED | OUTPATIENT
Start: 2024-10-03 | End: 2024-10-07 | Stop reason: HOSPADM

## 2024-10-03 RX ORDER — ACETAMINOPHEN 650 MG/1
650 SUPPOSITORY RECTAL EVERY 4 HOURS PRN
Status: DISCONTINUED | OUTPATIENT
Start: 2024-10-03 | End: 2024-10-03

## 2024-10-03 RX ORDER — ATENOLOL 25 MG/1
25 TABLET ORAL DAILY
Status: DISCONTINUED | OUTPATIENT
Start: 2024-10-03 | End: 2024-10-07 | Stop reason: HOSPADM

## 2024-10-03 RX ORDER — ALUMINUM HYDROXIDE, MAGNESIUM HYDROXIDE, AND SIMETHICONE 1200; 120; 1200 MG/30ML; MG/30ML; MG/30ML
30 SUSPENSION ORAL 4 TIMES DAILY PRN
Status: DISCONTINUED | OUTPATIENT
Start: 2024-10-03 | End: 2024-10-07 | Stop reason: HOSPADM

## 2024-10-03 RX ORDER — POLYETHYLENE GLYCOL 3350 17 G/17G
17 POWDER, FOR SOLUTION ORAL DAILY PRN
Status: DISCONTINUED | OUTPATIENT
Start: 2024-10-03 | End: 2024-10-07 | Stop reason: HOSPADM

## 2024-10-03 RX ORDER — IBUPROFEN 200 MG
16 TABLET ORAL
Status: DISCONTINUED | OUTPATIENT
Start: 2024-10-03 | End: 2024-10-07 | Stop reason: HOSPADM

## 2024-10-03 RX ORDER — GABAPENTIN 300 MG/1
600 CAPSULE ORAL 3 TIMES DAILY
Status: DISCONTINUED | OUTPATIENT
Start: 2024-10-03 | End: 2024-10-07 | Stop reason: HOSPADM

## 2024-10-03 RX ORDER — MUPIROCIN 20 MG/G
OINTMENT TOPICAL 2 TIMES DAILY
Status: DISCONTINUED | OUTPATIENT
Start: 2024-10-03 | End: 2024-10-07 | Stop reason: HOSPADM

## 2024-10-03 RX ORDER — TALC
6 POWDER (GRAM) TOPICAL NIGHTLY PRN
Status: DISCONTINUED | OUTPATIENT
Start: 2024-10-03 | End: 2024-10-07 | Stop reason: HOSPADM

## 2024-10-03 RX ORDER — HYDROCODONE BITARTRATE AND ACETAMINOPHEN 5; 325 MG/1; MG/1
1 TABLET ORAL EVERY 6 HOURS PRN
Status: DISCONTINUED | OUTPATIENT
Start: 2024-10-03 | End: 2024-10-07 | Stop reason: HOSPADM

## 2024-10-03 RX ORDER — HYDROCHLOROTHIAZIDE 25 MG/1
25 TABLET ORAL DAILY
Status: DISCONTINUED | OUTPATIENT
Start: 2024-10-03 | End: 2024-10-07 | Stop reason: HOSPADM

## 2024-10-03 RX ORDER — MORPHINE SULFATE 4 MG/ML
2 INJECTION, SOLUTION INTRAMUSCULAR; INTRAVENOUS EVERY 4 HOURS PRN
Status: DISCONTINUED | OUTPATIENT
Start: 2024-10-03 | End: 2024-10-07 | Stop reason: HOSPADM

## 2024-10-03 RX ORDER — SIMETHICONE 80 MG
1 TABLET,CHEWABLE ORAL 4 TIMES DAILY PRN
Status: DISCONTINUED | OUTPATIENT
Start: 2024-10-03 | End: 2024-10-07 | Stop reason: HOSPADM

## 2024-10-03 RX ORDER — ENOXAPARIN SODIUM 100 MG/ML
40 INJECTION SUBCUTANEOUS EVERY 24 HOURS
Status: DISCONTINUED | OUTPATIENT
Start: 2024-10-03 | End: 2024-10-03

## 2024-10-03 RX ORDER — AMLODIPINE BESYLATE 10 MG/1
10 TABLET ORAL DAILY
Status: DISCONTINUED | OUTPATIENT
Start: 2024-10-03 | End: 2024-10-07 | Stop reason: HOSPADM

## 2024-10-03 RX ORDER — ATENOLOL 25 MG/1
25 TABLET ORAL DAILY
COMMUNITY

## 2024-10-03 RX ADMIN — CEFEPIME 2 G: 2 INJECTION, POWDER, FOR SOLUTION INTRAVENOUS at 12:10

## 2024-10-03 RX ADMIN — CEFEPIME 2 G: 2 INJECTION, POWDER, FOR SOLUTION INTRAVENOUS at 03:10

## 2024-10-03 RX ADMIN — SODIUM CHLORIDE, POTASSIUM CHLORIDE, SODIUM LACTATE AND CALCIUM CHLORIDE: 600; 310; 30; 20 INJECTION, SOLUTION INTRAVENOUS at 01:10

## 2024-10-03 RX ADMIN — ATENOLOL 25 MG: 25 TABLET ORAL at 09:10

## 2024-10-03 RX ADMIN — SODIUM CHLORIDE, POTASSIUM CHLORIDE, SODIUM LACTATE AND CALCIUM CHLORIDE: 600; 310; 30; 20 INJECTION, SOLUTION INTRAVENOUS at 04:10

## 2024-10-03 RX ADMIN — MUPIROCIN: 20 OINTMENT TOPICAL at 09:10

## 2024-10-03 NOTE — ASSESSMENT & PLAN NOTE
Patient is identified as having  unspecified  heart failure that is Chronic. CHF is currently uncontrolled due to Pulmonary edema/pleural effusion on CXR. Latest ECHO performed and demonstrates- Results for orders placed during the hospital encounter of 09/03/24    Echo    Interpretation Summary    Left Ventricle: The left ventricle is normal in size. Normal wall thickness. There is normal systolic function with a visually estimated ejection fraction of 60 - 65%. Biplane (2D) method of discs ejection fraction is 56%. Global longitudinal strain is -19.5%. There is normal diastolic function.    Right Ventricle: Normal right ventricular cavity size. Wall thickness is normal. Systolic function is normal.    IVC/SVC: Normal venous pressure at 3 mmHg.    Malignant neoplasm of both ovaries    Baseline echo 9/3/2024  NORM -19.47%  Biplane 56%  . Continue Beta Blocker, ACE/ARB, and Furosemide and monitor clinical status closely. Monitor on telemetry. Patient is off CHF pathway.  Monitor strict Is&Os and daily weights.  Place on fluid restriction of 2 L. Cardiology has not been consulted. Continue to stress to patient importance of self efficacy and  on diet for CHF. Last BNP reviewed- and noted below   Recent Labs   Lab 10/02/24  [id number]   BNP 14

## 2024-10-03 NOTE — ASSESSMENT & PLAN NOTE
Patient has a diagnosis of pneumonia. The cause of the pneumonia is unknown at this time. The pneumonia is worsening due to hypoxia . The patient has the following signs/symptoms of pneumonia: persistent hypoxia  and shortness of breath. The patient does have a current oxygen requirement and the patient does not have a home oxygen requirement. I have reviewed the pertinent imaging. The following cultures have been collected: Blood cultures The culture results are listed below.     Current antimicrobial regimen consists of the antibiotics listed below. Will monitor patient closely and continue current treatment plan unchanged.    Antibiotics (From admission, onward)      Start     Stop Route Frequency Ordered    10/03/24 0900  mupirocin 2 % ointment         10/08/24 0859 Nasl 2 times daily 10/03/24 0035    10/03/24 0130  ceFEPIme (MAXIPIME) 2 g in D5W 100 mL IVPB (MB+)         -- IV Every 12 hours (non-standard times) 10/03/24 0017            Microbiology Results (last 7 days)       Procedure Component Value Units Date/Time    Blood culture #2 **CANNOT BE ORDERED STAT** [[id number]] Collected: 10/03/24 0040    Order Status: Sent Specimen: Blood from Peripheral, Upper Arm, Right     Blood culture #1 **CANNOT BE ORDERED STAT** [[id number]] Collected: 10/03/24 0040    Order Status: Sent Specimen: Blood from Peripheral, Hand, Left

## 2024-10-03 NOTE — H&P
[facility] - Emergency Dept.  [facility] Medicine  History & Physical    Patient Name: Casie Gaines  MRN: [id number]  Patient Class: IP- Inpatient  Admission Date: 10/2/2024  Attending Physician: Robbie Jacinto MD  Primary Care Provider: Rossana Ang MD         Patient information was obtained from patient, past medical records, and ER records.     Subjective:     Principal Problem:CAP (community acquired pneumonia)    Chief Complaint:   Chief Complaint   Patient presents with    Shortness of Breath     Shortness of breath and weakness gradually increasing over the past week. States she cannot walk short distances without having to sit down.         HPI: Casie Gaines is a 68 y.o. female with a PMH  has a past medical history of Anemia, Anxiety, Arthritis, Cancer, CHF (congestive heart failure), antineoplastic chemotherapy, Hyperlipemia, Hypertension, Neck pain, Ovarian cancer (2019), and Peritoneal carcinomatosis (01/26/2019).presented to the Emergency Department for evaluation of shortness of breathe which onset gradually the past week.  Patient was notes that she was becomes short of breath when she was ambulating.  She denies any chest pain but did note some mild twinges earlier this week.  She has a history of a DVT in his currently on anticoagulation for this.  She was an underlying history of ovarian cancer and notes that the DVT develop postoperatively.  He was denies any pleuritic symptoms.  She denies any hemoptysis.  He was no fevers or chills.  She currently notes that she has been off of her Lasix for several months due to at dry now her lips.. Symptoms are constant and moderate in severity. No mitigating or exacerbating factors reported. No associated sxs. Patient denies any swelling, fever, chills, sore throat and all other sxs at this time. No prior Tx. Pt reports being on Eliquis because of stomach blood clot. Pt is on chemo, but not on dialysis. No further complaints or concerns at  this time.     ER workup revealed CBC to be unremarkable.  CMP revealed slightly worsening liver enzymes with AST/ALT of 176/93 in his ALP of 288.  BNP and troponin negative.  UA negative. CXR revealed:  Lungs demonstrate diffuse increased interstitial and parenchymal attenuation favored to reflect edema in the appropriate clinical setting.  There are bilateral pleural effusions, left more so than right.    CTA of the chest revealed:  1.  No pulmonary emboli.  2.  New moderate-sized bilateral pleural effusions as described above with adjacent atelectasis or consolidation.  3.  No enlarged mediastinal and hilar lymph nodes.  4.  Intralobular septal thickening and associated groundglass opacity bilaterally felt to represent edema.  5.  New irregular soft tissue mass in the left upper quadrant concerning for carcinomatosis.    EKG revealed normal sinus rhythm with nonspecific T-wave abnormalities with a ventricular rate of 100 beats per minute and a QT/QTC of 342/441.  Patient was initially hypoxic with room air O2 saturation of 85%, but did improve to 98% on 3 liters/minute via nasal cannula.  All remaining vital signs stable.  Patient received 2 g cefepime in ER in addition to 80 mg Lasix IV.  Hospital Medicine consulted to admit patient for community-acquired pneumonia and hypoxia.  Patient in agreement with treatment plan.  Patient will be admitted under inpatient status.    PCP: Rossana Ang      Past Medical History:   Diagnosis Date    Anemia     Anxiety     Arthritis     knees    Cancer     ovarian    CHF (congestive heart failure)     Hx antineoplastic chemotherapy     last 2019    Hyperlipemia     Hypertension     Neck pain     Ovarian cancer 2019    CHEMO    Peritoneal carcinomatosis 2019       Past Surgical History:   Procedure Laterality Date    breast reduction  10/02/2018    CATARACT EXTRACTION Bilateral     2023     SECTION      X 1    COLONOSCOPY N/A 2021     Procedure: COLONOSCOPY;  Surgeon: Paulette Rojas MD;  Location: [facility] ENDO;  Service: Gastroenterology;  Laterality: N/A;    CYSTOSCOPY W/ URETERAL STENT PLACEMENT Right 01/30/2019    Procedure: CYSTOSCOPY, WITH URETERAL STENT INSERTION;  Surgeon: Timmy Santiago IV, MD;  Location: [facility] OR;  Service: Urology;  Laterality: Right;    CYSTOSCOPY W/ URETERAL STENT REMOVAL  10/04/2019    DILATION AND CURETTAGE OF UTERUS      HYSTERECTOMY      RALH for fibroids (still has ovaries)    INSERTION OF VENOUS ACCESS PORT Left 02/18/2019    Procedure: INSERTION, VENOUS ACCESS PORT;  Surgeon: Ulisses Monzon MD;  Location: [facility] OR;  Service: General;  Laterality: Left;  Left internal jugular     LYSIS OF ADHESIONS OF URETER N/A 08/15/2019    Procedure: URETEROLYSIS;  Surgeon: Ismael Juarez MD;  Location: [facility] OR;  Service: OB/GYN;  Laterality: N/A;    OMENTECTOMY N/A 08/15/2019    Procedure: OMENTECTOMY;  Surgeon: Ismael Juarez MD;  Location: [facility] OR;  Service: OB/GYN;  Laterality: N/A;    OOPHORECTOMY      RETROGRADE PYELOGRAPHY Right 01/30/2019    Procedure: PYELOGRAM, RETROGRADE;  Surgeon: Timmy Santiago IV, MD;  Location: [facility] OR;  Service: Urology;  Laterality: Right;    ROBOT-ASSISTED LAPAROSCOPIC SALPINGO-OOPHORECTOMY USING DA TIA XI Bilateral 08/15/2019    Procedure: XI ROBOTIC SALPINGO-OOPHORECTOMY;  Surgeon: Ismael Juarez MD;  Location: [facility];  Service: OB/GYN;  Laterality: Bilateral;    TOTAL REDUCTION MAMMOPLASTY  2018    TUBAL LIGATION         Review of patient's allergies indicates:  No Known Allergies    Current Facility-Administered Medications on File Prior to Encounter   Medication    alteplase injection 2 mg     Current Outpatient Medications on File Prior to Encounter   Medication Sig    albuterol (PROVENTIL/VENTOLIN HFA) 90 mcg/actuation inhaler Inhale 2 puffs into the lungs every 4 (four) hours as needed for Wheezing. Rescue    ALPRAZolam (XANAX) 0.25 MG tablet Take 1 tablet (0.25 mg total) by  mouth 3 (three) times daily as needed for Anxiety.    amLODIPine (NORVASC) 5 MG tablet Take 2 tablets (10 mg total) by mouth once daily.    apixaban (ELIQUIS DVT-PE TREAT 30D START) 5 mg (74 tabs) DsPk For the first 7 days take two 5 mg tablets twice daily.  After 7 days take one 5 mg tablet twice daily.    atenoloL (TENORMIN) 25 MG tablet Take 1 tablet (25 mg total) by mouth once daily.    atenoloL (TENORMIN) 25 MG tablet Take 25 mg by mouth once daily.    biotin 1 mg tablet Take 1,000 mcg by mouth once daily.     gabapentin (NEURONTIN) 300 MG capsule Take 2 capsules (600 mg total) by mouth 3 (three) times daily.    ginkgo biloba 40 mg Tab Take 40 mg by mouth once daily.    multivitamin with minerals tablet Take 1 tablet by mouth once daily.     olmesartan-hydrochlorothiazide (BENICAR HCT) 40-25 mg per tablet Take 1 tablet by mouth once daily.    potassium chloride SA (K-DUR,KLOR-CON) 20 MEQ tablet Take 1 tablet (20 mEq total) by mouth 2 (two) times daily.    rosuvastatin (CRESTOR) 10 MG tablet Take 1 tablet (10 mg total) by mouth once daily.    cetirizine (ZYRTEC) 10 MG tablet Take 1 tablet (10 mg total) by mouth once daily. (Patient taking differently: Take 10 mg by mouth as needed for Allergies.)    diclofenac sodium (VOLTAREN) 1 % Gel Apply once a day to back as needed    EPINEPHrine (EPIPEN) 0.3 mg/0.3 mL AtIn Inject 0.3 mLs (0.3 mg total) into the muscle once. for 1 dose    fluticasone propionate (FLONASE) 50 mcg/actuation nasal spray 1 spray (50 mcg total) by Each Nostril route every 12 (twelve) hours as needed for Rhinitis.    furosemide (LASIX) 40 MG tablet Take 1 tablet (40 mg total) by mouth once daily.    prochlorperazine (COMPAZINE) 5 MG tablet Take 1 tablet (5 mg total) by mouth every 6 (six) hours as needed for Nausea.    senna-docusate 8.6-50 mg (SENNA WITH DOCUSATE SODIUM) 8.6-50 mg per tablet Take 1 tablet by mouth daily as needed for Constipation.    sertraline (ZOLOFT) 25 MG tablet Take 1 tablet  (25 mg total) by mouth once daily.    traMADoL (ULTRAM) 50 mg tablet Take 1 tablet (50 mg total) by mouth every 12 (twelve) hours as needed for Pain.     Family History       Problem Relation (Age of Onset)    Anesthesia problems Other    Breast cancer Maternal Aunt, Paternal Aunt    Colon cancer Brother    Glaucoma Mother    No Known Problems Father    Ovarian cancer Paternal Aunt          Tobacco Use    Smoking status: Former     Current packs/day: 0.00     Average packs/day: 1 pack/day for 25.0 years (25.0 ttl pk-yrs)     Types: Cigarettes     Start date: 10/1/1993     Quit date: 10/1/2018     Years since quittin.0    Smokeless tobacco: Never    Tobacco comments:     States started quit 2 months ago after 30 years   Substance and Sexual Activity    Alcohol use: Yes     Comment: occasionally  No alcohol 72h prior to sx    Drug use: No    Sexual activity: Not Currently     Partners: Male     Comment: hyst; mut monog     Review of Systems   Constitutional:  Positive for fever. Negative for chills, diaphoresis and fatigue.   HENT:  Positive for congestion. Negative for sore throat.    Respiratory:  Positive for shortness of breath. Negative for cough.    Cardiovascular:  Negative for chest pain, palpitations and leg swelling.   Gastrointestinal:  Negative for diarrhea, nausea and vomiting.   Genitourinary:  Negative for dysuria, flank pain and frequency.   Neurological:  Positive for weakness. Negative for dizziness, light-headedness and headaches.   All other systems reviewed and are negative.    Objective:     Vital Signs (Most Recent):  Temp: 98.4 °F (36.9 °C) (10/02/24 2124)  Pulse: 87 (10/03/24 0500)  Resp: 14 (10/03/24 0500)  BP: 112/69 (10/03/24 0500)  SpO2: 97 % (10/03/24 0500) Vital Signs (24h Range):  Temp:  [98.4 °F (36.9 °C)] 98.4 °F (36.9 °C)  Pulse:  [] 87  Resp:  [14-23] 14  SpO2:  [85 %-99 %] 97 %  BP: (101-139)/(57-78) 112/69     Weight: 105.3 kg (232 lb 2.3 oz)  Body mass index is 36.36  kg/m².     Physical Exam  Vitals and nursing note reviewed.   Constitutional:       General: She is awake. She is not in acute distress.     Appearance: Normal appearance. She is well-developed and well-groomed. She is not ill-appearing, toxic-appearing or diaphoretic.   HENT:      Head: Normocephalic and atraumatic.      Mouth/Throat:      Lips: Pink.      Mouth: Mucous membranes are moist.      Pharynx: Oropharynx is clear. Uvula midline.   Eyes:      Extraocular Movements: Extraocular movements intact.      Conjunctiva/sclera: Conjunctivae normal.      Pupils: Pupils are equal, round, and reactive to light.   Cardiovascular:      Rate and Rhythm: Normal rate and regular rhythm.      Heart sounds: Normal heart sounds. No murmur heard.  Pulmonary:      Effort: Pulmonary effort is normal.      Breath sounds: Normal breath sounds. No decreased breath sounds, wheezing, rhonchi or rales.   Abdominal:      General: Bowel sounds are normal.      Palpations: Abdomen is soft.      Tenderness: There is no abdominal tenderness.   Musculoskeletal:      Cervical back: Normal range of motion and neck supple.      Right lower leg: No edema.      Left lower leg: No edema.      Comments: 5/5 strength throughout   Skin:     General: Skin is warm and dry.      Capillary Refill: Capillary refill takes less than 2 seconds.   Neurological:      General: No focal deficit present.      Mental Status: She is alert and oriented to person, place, and time. Mental status is at baseline.      GCS: GCS eye subscore is 4. GCS verbal subscore is 5. GCS motor subscore is 6.      Cranial Nerves: Cranial nerves 2-12 are intact.      Sensory: Sensation is intact.      Motor: Motor function is intact.   Psychiatric:         Mood and Affect: Mood normal.         Speech: Speech normal.         Behavior: Behavior normal. Behavior is cooperative.              CRANIAL NERVES     CN III, IV, VI   Pupils are equal, round, and reactive to  light.  LABS:  Recent Results (from the past 24 hours)   Urinalysis    Collection Time: 10/02/24 11:52 AM   Result Value Ref Range    Specimen UA Urine, Clean Catch     Color, UA Yellow Yellow, Straw, Lucero    Appearance, UA Clear Clear    pH, UA 6.0 5.0 - 8.0    Specific Gravity, UA 1.025 1.005 - 1.030    Protein, UA Trace (A) Negative    Glucose, UA Negative Negative    Ketones, UA Negative Negative    Bilirubin (UA) Negative Negative    Occult Blood UA Negative Negative    Nitrite, UA Negative Negative    Leukocytes, UA Negative Negative   Phosphorus    Collection Time: 10/02/24 11:52 AM   Result Value Ref Range    Phosphorus 3.8 2.7 - 4.5 mg/dL   Magnesium    Collection Time: 10/02/24 11:52 AM   Result Value Ref Range    Magnesium 1.9 1.6 - 2.6 mg/dL   Comprehensive Metabolic Panel    Collection Time: 10/02/24 11:52 AM   Result Value Ref Range    Sodium 142 136 - 145 mmol/L    Potassium 4.1 3.5 - 5.1 mmol/L    Chloride 106 95 - 110 mmol/L    CO2 24 23 - 29 mmol/L    Glucose 92 70 - 110 mg/dL    BUN 26 (H) 8 - 23 mg/dL    Creatinine 1.2 0.5 - 1.4 mg/dL    Calcium 9.5 8.7 - 10.5 mg/dL    Total Protein 7.1 6.0 - 8.4 g/dL    Albumin 3.1 (L) 3.5 - 5.2 g/dL    Total Bilirubin 0.7 0.1 - 1.0 mg/dL    Alkaline Phosphatase 305 (H) 55 - 135 U/L     (H) 10 - 40 U/L     (H) 10 - 44 U/L    eGFR 49 (A) >60 mL/min/1.73 m^2    Anion Gap 12 8 - 16 mmol/L   CBC Auto Differential    Collection Time: 10/02/24 11:52 AM   Result Value Ref Range    WBC 3.88 (L) 3.90 - 12.70 K/uL    RBC 4.50 4.00 - 5.40 M/uL    Hemoglobin 12.4 12.0 - 16.0 g/dL    Hematocrit 40.5 37.0 - 48.5 %    MCV 90 82 - 98 fL    MCH 27.6 27.0 - 31.0 pg    MCHC 30.6 (L) 32.0 - 36.0 g/dL    RDW 18.8 (H) 11.5 - 14.5 %    Platelets 385 150 - 450 K/uL    MPV 9.7 9.2 - 12.9 fL    Immature Granulocytes 0.3 0.0 - 0.5 %    Gran # (ANC) 1.8 1.8 - 7.7 K/uL    Immature Grans (Abs) 0.01 0.00 - 0.04 K/uL    Lymph # 1.2 1.0 - 4.8 K/uL    Mono # 0.8 0.3 - 1.0 K/uL     Eos # 0.0 0.0 - 0.5 K/uL    Baso # 0.08 0.00 - 0.20 K/uL    nRBC 0 0 /100 WBC    Gran % 46.4 38.0 - 73.0 %    Lymph % 29.6 18.0 - 48.0 %    Mono % 21.1 (H) 4.0 - 15.0 %    Eosinophil % 0.5 0.0 - 8.0 %    Basophil % 2.1 (H) 0.0 - 1.9 %    Differential Method Automated    CBC auto differential    Collection Time: 10/02/24 10:46 PM   Result Value Ref Range    WBC 3.70 (L) 3.90 - 12.70 K/uL    RBC 3.97 (L) 4.00 - 5.40 M/uL    Hemoglobin 11.2 (L) 12.0 - 16.0 g/dL    Hematocrit 35.3 (L) 37.0 - 48.5 %    MCV 89 82 - 98 fL    MCH 28.2 27.0 - 31.0 pg    MCHC 31.7 (L) 32.0 - 36.0 g/dL    RDW 18.4 (H) 11.5 - 14.5 %    Platelets 330 150 - 450 K/uL    MPV 9.9 9.2 - 12.9 fL    Immature Granulocytes 0.3 0.0 - 0.5 %    Gran # (ANC) 1.8 1.8 - 7.7 K/uL    Immature Grans (Abs) 0.01 0.00 - 0.04 K/uL    Lymph # 1.0 1.0 - 4.8 K/uL    Mono # 0.9 0.3 - 1.0 K/uL    Eos # 0.0 0.0 - 0.5 K/uL    Baso # 0.08 0.00 - 0.20 K/uL    nRBC 0 0 /100 WBC    Gran % 48.1 38.0 - 73.0 %    Lymph % 25.7 18.0 - 48.0 %    Mono % 23.2 (H) 4.0 - 15.0 %    Eosinophil % 0.5 0.0 - 8.0 %    Basophil % 2.2 (H) 0.0 - 1.9 %    Differential Method Automated    Comprehensive metabolic panel    Collection Time: 10/02/24 10:46 PM   Result Value Ref Range    Sodium 139 136 - 145 mmol/L    Potassium 3.8 3.5 - 5.1 mmol/L    Chloride 106 95 - 110 mmol/L    CO2 24 23 - 29 mmol/L    Glucose 78 70 - 110 mg/dL    BUN 26 (H) 8 - 23 mg/dL    Creatinine 1.1 0.5 - 1.4 mg/dL    Calcium 8.8 8.7 - 10.5 mg/dL    Total Protein 6.2 6.0 - 8.4 g/dL    Albumin 2.7 (L) 3.5 - 5.2 g/dL    Total Bilirubin 0.7 0.1 - 1.0 mg/dL    Alkaline Phosphatase 288 (H) 55 - 135 U/L     (H) 10 - 40 U/L    ALT 93 (H) 10 - 44 U/L    eGFR 54 (A) >60 mL/min/1.73 m^2    Anion Gap 9 8 - 16 mmol/L   Troponin I    Collection Time: 10/02/24 10:46 PM   Result Value Ref Range    Troponin I <0.006 0.000 - 0.026 ng/mL   Brain natriuretic peptide    Collection Time: 10/02/24 10:46 PM   Result Value Ref Range    BNP 14  0 - 99 pg/mL       RADIOLOGY  X-Ray Chest AP Portable    Result Date: 10/2/2024  EXAMINATION: XR CHEST AP PORTABLE CLINICAL HISTORY: CHF; TECHNIQUE: Single frontal view of the chest was performed. COMPARISON: Chest radiograph 05/28/2024, CTA chest 10/02/2024 FINDINGS: There is a left chest wall MediPort catheter in stable position.  Stable size and configuration of the cardiac silhouette.  There is aortic atherosclerosis.  Lungs demonstrate diffuse increased interstitial and parenchymal attenuation favored to reflect edema in the appropriate clinical setting.  There are bilateral pleural effusions, left more so than right.  No evidence of pneumothorax.  Osseous structures demonstrate mild degenerative changes.     Abnormal chest radiograph as above. Electronically signed by: Luis A Walker MD Date:    10/02/2024 Time:    23:12    CTA Chest Non-Coronary (PE Studies)    Result Date: 10/2/2024  Exam: CTA CHEST NON CORONARY (PE STUDIES) Comparison: 05/18/2024 Clinical Indication:  Concern for pulmonary emboli Technique: CT of the chest is performed after the administration of contrast. Data acquisition was obtained during the pulmonary arterial phase of enhancement.  CT Angiogram (CTA) of the chest was performed with 3D reconstruction. Sagittal, coronal and MIP images were also obtained. Findings: Central venous catheter on the left with the tip in the low SVC. Adequate bolus for evaluation.  No pulmonary emboli.  No pericardial effusion or significant coronary artery calcifications. There are new enlarged mediastinal and hilar lymph nodes measuring up to 2 cm in the prevascular space and 2.5 cm inferior to the paresh.  Enlarged lymph node in the right hilar region measuring 2.1 cm.  No axillary lymphadenopathy. There are new moderate-sized bilateral pleural effusions with a loculated component extending along the left lateral hemithorax continuing into the major fissure.  Atelectasis or consolidation adjacent to the  pleural effusions. There is interlobular septal thickening bilaterally with associated groundglass opacity.  Additional atelectasis or consolidation anterior to the right major fissure New irregular soft tissue mass in the left upper quadrant measuring approximately 6 cm..  Borderline-enlarged retroperitoneal lymph nodes adjacent to the superior mesenteric artery. No concerning lytic or sclerotic bony lesions.     1.  No pulmonary emboli. 2.  New moderate-sized bilateral pleural effusions as described above with adjacent atelectasis or consolidation. 3.  No enlarged mediastinal and hilar lymph nodes. 4.  Intralobular septal thickening and associated groundglass opacity bilaterally felt to represent edema. 5.  New irregular soft tissue mass in the left upper quadrant concerning for carcinomatosis. All CT scans at [this location] are performed using dose modulation techniques as appropriate to a performed exam including the following: automated exposure control; adjustment of the mA and/or kV according to patient size (this includes techniques or standardized protocols for targeted exams where dose is matched to indication / reason for exam; i.e. extremities or head); use of iterative reconstruction technique. Finalized on: 10/2/2024 10:52 PM By:  Gagan Abarca [facility]# [id number]      2024-10-02 22:54:[id number]    [facility]    Echo    Result Date: 9/3/2024    Left Ventricle: The left ventricle is normal in size. Normal wall thickness. There is normal systolic function with a visually estimated ejection fraction of 60 - 65%. Biplane (2D) method of discs ejection fraction is 56%. Global longitudinal strain is -19.5%. There is normal diastolic function.   Right Ventricle: Normal right ventricular cavity size. Wall thickness is normal. Systolic function is normal.   IVC/SVC: Normal venous pressure at 3 mmHg. Malignant neoplasm of both ovaries Baseline echo 9/3/2024 NORM -19.47% Biplane 56%       EKG    MICROBIOLOGY    MDM     Amount  and/or Complexity of Data Reviewed  Clinical lab tests: reviewed  Tests in the radiology section of CPT®: reviewed  Tests in the medicine section of CPT®: reviewed  Discussion of test results with the performing providers: yes  Decide to obtain previous medical records or to obtain history from someone other than the patient: yes  Obtain history from someone other than the patient: yes  Review and summarize past medical records: yes  Discuss the patient with other providers: yes  Independent visualization of images, tracings, or specimens: yes        Assessment/Plan:     * CAP (community acquired pneumonia)  Patient has a diagnosis of pneumonia. The cause of the pneumonia is unknown at this time. The pneumonia is worsening due to hypoxia . The patient has the following signs/symptoms of pneumonia: persistent hypoxia  and shortness of breath. The patient does have a current oxygen requirement and the patient does not have a home oxygen requirement. I have reviewed the pertinent imaging. The following cultures have been collected: Blood cultures The culture results are listed below.     Current antimicrobial regimen consists of the antibiotics listed below. Will monitor patient closely and continue current treatment plan unchanged.    Antibiotics (From admission, onward)      Start     Stop Route Frequency Ordered    10/03/24 0900  mupirocin 2 % ointment         10/08/24 0859 Nasl 2 times daily 10/03/24 0035    10/03/24 0130  ceFEPIme (MAXIPIME) 2 g in D5W 100 mL IVPB (MB+)         -- IV Every 12 hours (non-standard times) 10/03/24 0017            Microbiology Results (last 7 days)       Procedure Component Value Units Date/Time    Blood culture #2 **CANNOT BE ORDERED STAT** [[id number]] Collected: 10/03/24 0040    Order Status: Sent Specimen: Blood from Peripheral, Upper Arm, Right     Blood culture #1 **CANNOT BE ORDERED STAT** [[id number]] Collected: 10/03/24 0040    Order Status: Sent Specimen: Blood from  Peripheral, Hand, Left             Hypoxia  Likely secondary to CAP.  Initiated on cefepime in ER.  Supplemental oxygen applied.  Satting 98% on 2 liters/minute via nasal cannula.  No acute distress.    Plan:   -supplemental oxygen to keep sats above 92%   -Elmer p.r.n.   -monitor pulse ox  -Obtain Respiratory panel      Transaminitis  Trending upward likely secondary to chemo treatment.   Plan:  -trend labs  -avoid hepatotoxic agents  -Hepatology consult if warranted      Ovarian cancer, bilateral  Followed by Dr. Pugh for Heme/Onc. Last evaluated by on 9/19/24. Last received Bevacizumab (9/20/24) and Doxorubicin/Bevacizumab in 2 weeks .  Plan:  -Heme/Onc consult      Peritoneal carcinomatosis  See Ovarian Cancer Tx plan above        Chemotherapy-induced peripheral neuropathy  Compliant with gabapentin.  Plan:  -Continue gabapentin      Hypertension  Chronic, controlled. Latest blood pressure and vitals reviewed-     Temp:  [98.4 °F (36.9 °C)]   Pulse:  []   Resp:  [19-23]   BP: (123-139)/(63-78)   SpO2:  [85 %-99 %] .   Home meds for hypertension were reviewed and noted below.   Hypertension Medications               amLODIPine (NORVASC) 5 MG tablet Take 2 tablets (10 mg total) by mouth once daily.    atenoloL (TENORMIN) 25 MG tablet Take 1 tablet (25 mg total) by mouth once daily.    furosemide (LASIX) 40 MG tablet Take 1 tablet (40 mg total) by mouth once daily.    olmesartan-hydrochlorothiazide (BENICAR HCT) 40-25 mg per tablet Take 1 tablet by mouth once daily.            While in the hospital, will manage blood pressure as follows; Continue home antihypertensive regimen    Will utilize p.r.n. blood pressure medication only if patient's blood pressure greater than 160/100 and she develops symptoms such as worsening chest pain or shortness of breath.    Hyperlipidemia  Patient is chronically on statin.will not continue for now. Last Lipid Panel:   Lab Results   Component Value Date    CHOL 162  06/06/2024    HDL 47 06/06/2024    LDLCALC 97.6 06/06/2024    TRIG 87 06/06/2024    CHOLHDL 29.0 06/06/2024     Plan:  -Hold home medication  -low fat/low calorie diet        CHF (congestive heart failure)  Patient is identified as having  unspecified  heart failure that is Chronic. CHF is currently uncontrolled due to Pulmonary edema/pleural effusion on CXR. Latest ECHO performed and demonstrates- Results for orders placed during the hospital encounter of 09/03/24    Echo    Interpretation Summary    Left Ventricle: The left ventricle is normal in size. Normal wall thickness. There is normal systolic function with a visually estimated ejection fraction of 60 - 65%. Biplane (2D) method of discs ejection fraction is 56%. Global longitudinal strain is -19.5%. There is normal diastolic function.    Right Ventricle: Normal right ventricular cavity size. Wall thickness is normal. Systolic function is normal.    IVC/SVC: Normal venous pressure at 3 mmHg.    Malignant neoplasm of both ovaries    Baseline echo 9/3/2024  NORM -19.47%  Biplane 56%  . Continue Beta Blocker, ACE/ARB, and Furosemide and monitor clinical status closely. Monitor on telemetry. Patient is off CHF pathway.  Monitor strict Is&Os and daily weights.  Place on fluid restriction of 2 L. Cardiology has not been consulted. Continue to stress to patient importance of self efficacy and  on diet for CHF. Last BNP reviewed- and noted below   Recent Labs   Lab 10/02/24  2246   BNP 14       Anxiety  Chronic. Stable. Not in acute exacerbation and currently denies endorsing any suicidal/homicidal ideations.   Plan:  -Continue home medications (xanax)          VTE Risk Mitigation (From admission, onward)           Ordered     IP VTE HIGH RISK PATIENT  Once         10/03/24 0033     Place sequential compression device  Until discontinued         10/03/24 0033                     Pharmacist Renal Dose Adjustment Note    Casie Gaines is a 68 y.o. female  being treated with the medication cefepime.    Patient Data:    Vital Signs (Most Recent):  Temp: 98.4 °F (36.9 °C) (10/02/24 2124)  Pulse: 91 (10/03/24 0000)  Resp: 19 (10/03/24 0000)  BP: 139/78 (10/03/24 0000)  SpO2: 97 % (10/03/24 0000) Vital Signs (72h Range):  Temp:  [98.4 °F (36.9 °C)]   Pulse:  []   Resp:  [19-20]   BP: (123-139)/(63-78)   SpO2:  [85 %-99 %]      Recent Labs   Lab 10/02/24  1152   CREATININE 1.2     Serum creatinine: 1.2 mg/dL 10/02/24 1152  Estimated creatinine clearance: 55.3 mL/min    Cefepime 2 g IV every 8 hours will be changed to cefepime 2 g IV every 12 hours for the treatment of lower respiratory infection and neutropenic fever with CrCl 30-60 ml/min.    Pharmacist's Name: Gage Monroe  Pharmacist's Extension: [phone]    //Core Measures   -DVT proph: SCDs, lovenox  -Code status Full    -Surrogate:none present    Components of this note were documented using a voice recognition system and are subject to errors not corrected at the time the document was proof read. Please contact the author for any clarifications.       Bob Jacinto NP  Department of Hospital Medicine  '[facility] - Emergency Dept.

## 2024-10-03 NOTE — ASSESSMENT & PLAN NOTE
Patient is chronically on statin.will not continue for now. Last Lipid Panel:   Lab Results   Component Value Date    CHOL 162 06/06/2024    HDL 47 06/06/2024    LDLCALC 97.6 06/06/2024    TRIG 87 06/06/2024    CHOLHDL 29.0 06/06/2024     Plan:  -Hold home medication  -low fat/low calorie diet

## 2024-10-03 NOTE — HPI
Casie Gaines is a 68 y.o. female with a PMH  has a past medical history of Anemia, Anxiety, Arthritis, Cancer, CHF (congestive heart failure), antineoplastic chemotherapy, Hyperlipemia, Hypertension, Neck pain, Ovarian cancer (2019), and Peritoneal carcinomatosis (01/26/2019).presented to the Emergency Department for evaluation of shortness of breathe which onset gradually the past week.  Patient was notes that she was becomes short of breath when she was ambulating.  She denies any chest pain but did note some mild twinges earlier this week.  She has a history of a DVT in his currently on anticoagulation for this.  She was an underlying history of ovarian cancer and notes that the DVT develop postoperatively.  He was denies any pleuritic symptoms.  She denies any hemoptysis.  He was no fevers or chills.  She currently notes that she has been off of her Lasix for several months due to at dry now her lips.. Symptoms are constant and moderate in severity. No mitigating or exacerbating factors reported. No associated sxs. Patient denies any swelling, fever, chills, sore throat and all other sxs at this time. No prior Tx. Pt reports being on Eliquis because of stomach blood clot. Pt is on chemo, but not on dialysis. No further complaints or concerns at this time.     ER workup revealed CBC to be unremarkable.  CMP revealed slightly worsening liver enzymes with AST/ALT of 176/93 in his ALP of 288.  BNP and troponin negative.  UA negative. CXR revealed:  Lungs demonstrate diffuse increased interstitial and parenchymal attenuation favored to reflect edema in the appropriate clinical setting.  There are bilateral pleural effusions, left more so than right.    CTA of the chest revealed:  1.  No pulmonary emboli.  2.  New moderate-sized bilateral pleural effusions as described above with adjacent atelectasis or consolidation.  3.  No enlarged mediastinal and hilar lymph nodes.  4.  Intralobular septal thickening and  associated groundglass opacity bilaterally felt to represent edema.  5.  New irregular soft tissue mass in the left upper quadrant concerning for carcinomatosis.    EKG revealed normal sinus rhythm with nonspecific T-wave abnormalities with a ventricular rate of 100 beats per minute and a QT/QTC of 342/441.  Patient was initially hypoxic with room air O2 saturation of 85%, but did improve to 98% on 3 liters/minute via nasal cannula.  All remaining vital signs stable.  Patient received 2 g cefepime in ER in addition to 80 mg Lasix IV.  Hospital Medicine consulted to admit patient for community-acquired pneumonia and hypoxia.  Patient in agreement with treatment plan.  Patient will be admitted under inpatient status.    PCP: Rossana Ang

## 2024-10-03 NOTE — ED NOTES
Pt. Refused to take potassium along with  gabapentin. provider requesting her to take atenolol due to her BP being 121/62 and denied her taking hydrochlorothiazide along with amlodipine.

## 2024-10-03 NOTE — ASSESSMENT & PLAN NOTE
Trending upward likely secondary to chemo treatment.   Plan:  -trend labs  -avoid hepatotoxic agents  -Hepatology consult if warranted

## 2024-10-03 NOTE — PROGRESS NOTES
Pharmacist Renal Dose Adjustment Note    Casie Gaines is a 68 y.o. female being treated with the medication cefepime.    Patient Data:    Vital Signs (Most Recent):  Temp: 98.4 °F (36.9 °C) (10/02/24 2124)  Pulse: 91 (10/03/24 0000)  Resp: 19 (10/03/24 0000)  BP: 139/78 (10/03/24 0000)  SpO2: 97 % (10/03/24 0000) Vital Signs (72h Range):  Temp:  [98.4 °F (36.9 °C)]   Pulse:  []   Resp:  [19-20]   BP: (123-139)/(63-78)   SpO2:  [85 %-99 %]      Recent Labs   Lab 10/02/24  1152   CREATININE 1.2     Serum creatinine: 1.2 mg/dL 10/02/24 1152  Estimated creatinine clearance: 55.3 mL/min    Cefepime 2 g IV every 8 hours will be changed to cefepime 2 g IV every 12 hours for the treatment of lower respiratory infection and neutropenic fever with CrCl 30-60 ml/min.    Pharmacist's Name: Gage Monroe  Pharmacist's Extension: [phone]

## 2024-10-03 NOTE — ASSESSMENT & PLAN NOTE
Likely secondary to CAP.  Initiated on cefepime in ER.  Supplemental oxygen applied.  Satting 98% on 2 liters/minute via nasal cannula.  No acute distress.    Plan:   -supplemental oxygen to keep sats above 92%   -Elmer p.r.n.   -monitor pulse ox  -Obtain Respiratory panel

## 2024-10-03 NOTE — SUBJECTIVE & OBJECTIVE
Past Medical History:   Diagnosis Date    Anemia     Anxiety     Arthritis     knees    Cancer     ovarian    CHF (congestive heart failure)     Hx antineoplastic chemotherapy     last 2019    Hyperlipemia     Hypertension     Neck pain     Ovarian cancer 2019    CHEMO    Peritoneal carcinomatosis 2019       Past Surgical History:   Procedure Laterality Date    breast reduction  10/02/2018    CATARACT EXTRACTION Bilateral     2023     SECTION      X 1    COLONOSCOPY N/A 2021    Procedure: COLONOSCOPY;  Surgeon: Paulette Rojas MD;  Location: [facility] ENDO;  Service: Gastroenterology;  Laterality: N/A;    CYSTOSCOPY W/ URETERAL STENT PLACEMENT Right 2019    Procedure: CYSTOSCOPY, WITH URETERAL STENT INSERTION;  Surgeon: Timmy Santiago IV, MD;  Location: [facility] OR;  Service: Urology;  Laterality: Right;    CYSTOSCOPY W/ URETERAL STENT REMOVAL  10/04/2019    DILATION AND CURETTAGE OF UTERUS      HYSTERECTOMY      RALH for fibroids (still has ovaries)    INSERTION OF VENOUS ACCESS PORT Left 2019    Procedure: INSERTION, VENOUS ACCESS PORT;  Surgeon: Ulisses Monzon MD;  Location: [facility] OR;  Service: General;  Laterality: Left;  Left internal jugular     LYSIS OF ADHESIONS OF URETER N/A 08/15/2019    Procedure: URETEROLYSIS;  Surgeon: Ismael Juarez MD;  Location: [facility] OR;  Service: OB/GYN;  Laterality: N/A;    OMENTECTOMY N/A 08/15/2019    Procedure: OMENTECTOMY;  Surgeon: Ismael Juarez MD;  Location: [facility] OR;  Service: OB/GYN;  Laterality: N/A;    OOPHORECTOMY      RETROGRADE PYELOGRAPHY Right 2019    Procedure: PYELOGRAM, RETROGRADE;  Surgeon: Timmy Santiago IV, MD;  Location: [facility] OR;  Service: Urology;  Laterality: Right;    ROBOT-ASSISTED LAPAROSCOPIC SALPINGO-OOPHORECTOMY USING DA TIA XI Bilateral 08/15/2019    Procedure: XI ROBOTIC SALPINGO-OOPHORECTOMY;  Surgeon: Ismael Juarez MD;  Location: [facility];  Service: OB/GYN;  Laterality: Bilateral;    TOTAL  REDUCTION MAMMOPLASTY  2018    TUBAL LIGATION         Review of patient's allergies indicates:  No Known Allergies    Current Facility-Administered Medications on File Prior to Encounter   Medication    alteplase injection 2 mg     Current Outpatient Medications on File Prior to Encounter   Medication Sig    albuterol (PROVENTIL/VENTOLIN HFA) 90 mcg/actuation inhaler Inhale 2 puffs into the lungs every 4 (four) hours as needed for Wheezing. Rescue    ALPRAZolam (XANAX) 0.25 MG tablet Take 1 tablet (0.25 mg total) by mouth 3 (three) times daily as needed for Anxiety.    amLODIPine (NORVASC) 5 MG tablet Take 2 tablets (10 mg total) by mouth once daily.    apixaban (ELIQUIS DVT-PE TREAT 30D START) 5 mg (74 tabs) DsPk For the first 7 days take two 5 mg tablets twice daily.  After 7 days take one 5 mg tablet twice daily.    atenoloL (TENORMIN) 25 MG tablet Take 1 tablet (25 mg total) by mouth once daily.    atenoloL (TENORMIN) 25 MG tablet Take 25 mg by mouth once daily.    biotin 1 mg tablet Take 1,000 mcg by mouth once daily.     gabapentin (NEURONTIN) 300 MG capsule Take 2 capsules (600 mg total) by mouth 3 (three) times daily.    ginkgo biloba 40 mg Tab Take 40 mg by mouth once daily.    multivitamin with minerals tablet Take 1 tablet by mouth once daily.     olmesartan-hydrochlorothiazide (BENICAR HCT) 40-25 mg per tablet Take 1 tablet by mouth once daily.    potassium chloride SA (K-DUR,KLOR-CON) 20 MEQ tablet Take 1 tablet (20 mEq total) by mouth 2 (two) times daily.    rosuvastatin (CRESTOR) 10 MG tablet Take 1 tablet (10 mg total) by mouth once daily.    cetirizine (ZYRTEC) 10 MG tablet Take 1 tablet (10 mg total) by mouth once daily. (Patient taking differently: Take 10 mg by mouth as needed for Allergies.)    diclofenac sodium (VOLTAREN) 1 % Gel Apply once a day to back as needed    EPINEPHrine (EPIPEN) 0.3 mg/0.3 mL AtIn Inject 0.3 mLs (0.3 mg total) into the muscle once. for 1 dose    fluticasone propionate  (FLONASE) 50 mcg/actuation nasal spray 1 spray (50 mcg total) by Each Nostril route every 12 (twelve) hours as needed for Rhinitis.    furosemide (LASIX) 40 MG tablet Take 1 tablet (40 mg total) by mouth once daily.    prochlorperazine (COMPAZINE) 5 MG tablet Take 1 tablet (5 mg total) by mouth every 6 (six) hours as needed for Nausea.    senna-docusate 8.6-50 mg (SENNA WITH DOCUSATE SODIUM) 8.6-50 mg per tablet Take 1 tablet by mouth daily as needed for Constipation.    sertraline (ZOLOFT) 25 MG tablet Take 1 tablet (25 mg total) by mouth once daily.    traMADoL (ULTRAM) 50 mg tablet Take 1 tablet (50 mg total) by mouth every 12 (twelve) hours as needed for Pain.     Family History       Problem Relation (Age of Onset)    Anesthesia problems Other    Breast cancer Maternal Aunt, Paternal Aunt    Colon cancer Brother    Glaucoma Mother    No Known Problems Father    Ovarian cancer Paternal Aunt          Tobacco Use    Smoking status: Former     Current packs/day: 0.00     Average packs/day: 1 pack/day for 25.0 years (25.0 ttl pk-yrs)     Types: Cigarettes     Start date: 10/1/1993     Quit date: 10/1/2018     Years since quittin.0    Smokeless tobacco: Never    Tobacco comments:     States started quit 2 months ago after 30 years   Substance and Sexual Activity    Alcohol use: Yes     Comment: occasionally  No alcohol 72h prior to sx    Drug use: No    Sexual activity: Not Currently     Partners: Male     Comment: hyst; mut monog     Review of Systems   Constitutional:  Positive for fever. Negative for chills, diaphoresis and fatigue.   HENT:  Positive for congestion. Negative for sore throat.    Respiratory:  Positive for shortness of breath. Negative for cough.    Cardiovascular:  Negative for chest pain, palpitations and leg swelling.   Gastrointestinal:  Negative for diarrhea, nausea and vomiting.   Genitourinary:  Negative for dysuria, flank pain and frequency.   Neurological:  Positive for weakness.  Negative for dizziness, light-headedness and headaches.   All other systems reviewed and are negative.    Objective:     Vital Signs (Most Recent):  Temp: 98.4 °F (36.9 °C) (10/02/24 2124)  Pulse: 87 (10/03/24 0500)  Resp: 14 (10/03/24 0500)  BP: 112/69 (10/03/24 0500)  SpO2: 97 % (10/03/24 0500) Vital Signs (24h Range):  Temp:  [98.4 °F (36.9 °C)] 98.4 °F (36.9 °C)  Pulse:  [] 87  Resp:  [14-23] 14  SpO2:  [85 %-99 %] 97 %  BP: (101-139)/(57-78) 112/69     Weight: 105.3 kg (232 lb 2.3 oz)  Body mass index is 36.36 kg/m².     Physical Exam  Vitals and nursing note reviewed.   Constitutional:       General: She is awake. She is not in acute distress.     Appearance: Normal appearance. She is well-developed and well-groomed. She is not ill-appearing, toxic-appearing or diaphoretic.   HENT:      Head: Normocephalic and atraumatic.      Mouth/Throat:      Lips: Pink.      Mouth: Mucous membranes are moist.      Pharynx: Oropharynx is clear. Uvula midline.   Eyes:      Extraocular Movements: Extraocular movements intact.      Conjunctiva/sclera: Conjunctivae normal.      Pupils: Pupils are equal, round, and reactive to light.   Cardiovascular:      Rate and Rhythm: Normal rate and regular rhythm.      Heart sounds: Normal heart sounds. No murmur heard.  Pulmonary:      Effort: Pulmonary effort is normal.      Breath sounds: Normal breath sounds. No decreased breath sounds, wheezing, rhonchi or rales.   Abdominal:      General: Bowel sounds are normal.      Palpations: Abdomen is soft.      Tenderness: There is no abdominal tenderness.   Musculoskeletal:      Cervical back: Normal range of motion and neck supple.      Right lower leg: No edema.      Left lower leg: No edema.      Comments: 5/5 strength throughout   Skin:     General: Skin is warm and dry.      Capillary Refill: Capillary refill takes less than 2 seconds.   Neurological:      General: No focal deficit present.      Mental Status: She is alert and  oriented to person, place, and time. Mental status is at baseline.      GCS: GCS eye subscore is 4. GCS verbal subscore is 5. GCS motor subscore is 6.      Cranial Nerves: Cranial nerves 2-12 are intact.      Sensory: Sensation is intact.      Motor: Motor function is intact.   Psychiatric:         Mood and Affect: Mood normal.         Speech: Speech normal.         Behavior: Behavior normal. Behavior is cooperative.              CRANIAL NERVES     CN III, IV, VI   Pupils are equal, round, and reactive to light.  LABS:  Recent Results (from the past 24 hours)   Urinalysis    Collection Time: 10/02/24 11:52 AM   Result Value Ref Range    Specimen UA Urine, Clean Catch     Color, UA Yellow Yellow, Straw, Lucero    Appearance, UA Clear Clear    pH, UA 6.0 5.0 - 8.0    Specific Gravity, UA 1.025 1.005 - 1.030    Protein, UA Trace (A) Negative    Glucose, UA Negative Negative    Ketones, UA Negative Negative    Bilirubin (UA) Negative Negative    Occult Blood UA Negative Negative    Nitrite, UA Negative Negative    Leukocytes, UA Negative Negative   Phosphorus    Collection Time: 10/02/24 11:52 AM   Result Value Ref Range    Phosphorus 3.8 2.7 - 4.5 mg/dL   Magnesium    Collection Time: 10/02/24 11:52 AM   Result Value Ref Range    Magnesium 1.9 1.6 - 2.6 mg/dL   Comprehensive Metabolic Panel    Collection Time: 10/02/24 11:52 AM   Result Value Ref Range    Sodium 142 136 - 145 mmol/L    Potassium 4.1 3.5 - 5.1 mmol/L    Chloride 106 95 - 110 mmol/L    CO2 24 23 - 29 mmol/L    Glucose 92 70 - 110 mg/dL    BUN 26 (H) 8 - 23 mg/dL    Creatinine 1.2 0.5 - 1.4 mg/dL    Calcium 9.5 8.7 - 10.5 mg/dL    Total Protein 7.1 6.0 - 8.4 g/dL    Albumin 3.1 (L) 3.5 - 5.2 g/dL    Total Bilirubin 0.7 0.1 - 1.0 mg/dL    Alkaline Phosphatase 305 (H) 55 - 135 U/L     (H) 10 - 40 U/L     (H) 10 - 44 U/L    eGFR 49 (A) >60 mL/min/1.73 m^2    Anion Gap 12 8 - 16 mmol/L   CBC Auto Differential    Collection Time: 10/02/24 11:52 AM    Result Value Ref Range    WBC 3.88 (L) 3.90 - 12.70 K/uL    RBC 4.50 4.00 - 5.40 M/uL    Hemoglobin 12.4 12.0 - 16.0 g/dL    Hematocrit 40.5 37.0 - 48.5 %    MCV 90 82 - 98 fL    MCH 27.6 27.0 - 31.0 pg    MCHC 30.6 (L) 32.0 - 36.0 g/dL    RDW 18.8 (H) 11.5 - 14.5 %    Platelets 385 150 - 450 K/uL    MPV 9.7 9.2 - 12.9 fL    Immature Granulocytes 0.3 0.0 - 0.5 %    Gran # (ANC) 1.8 1.8 - 7.7 K/uL    Immature Grans (Abs) 0.01 0.00 - 0.04 K/uL    Lymph # 1.2 1.0 - 4.8 K/uL    Mono # 0.8 0.3 - 1.0 K/uL    Eos # 0.0 0.0 - 0.5 K/uL    Baso # 0.08 0.00 - 0.20 K/uL    nRBC 0 0 /100 WBC    Gran % 46.4 38.0 - 73.0 %    Lymph % 29.6 18.0 - 48.0 %    Mono % 21.1 (H) 4.0 - 15.0 %    Eosinophil % 0.5 0.0 - 8.0 %    Basophil % 2.1 (H) 0.0 - 1.9 %    Differential Method Automated    CBC auto differential    Collection Time: 10/02/24 10:46 PM   Result Value Ref Range    WBC 3.70 (L) 3.90 - 12.70 K/uL    RBC 3.97 (L) 4.00 - 5.40 M/uL    Hemoglobin 11.2 (L) 12.0 - 16.0 g/dL    Hematocrit 35.3 (L) 37.0 - 48.5 %    MCV 89 82 - 98 fL    MCH 28.2 27.0 - 31.0 pg    MCHC 31.7 (L) 32.0 - 36.0 g/dL    RDW 18.4 (H) 11.5 - 14.5 %    Platelets 330 150 - 450 K/uL    MPV 9.9 9.2 - 12.9 fL    Immature Granulocytes 0.3 0.0 - 0.5 %    Gran # (ANC) 1.8 1.8 - 7.7 K/uL    Immature Grans (Abs) 0.01 0.00 - 0.04 K/uL    Lymph # 1.0 1.0 - 4.8 K/uL    Mono # 0.9 0.3 - 1.0 K/uL    Eos # 0.0 0.0 - 0.5 K/uL    Baso # 0.08 0.00 - 0.20 K/uL    nRBC 0 0 /100 WBC    Gran % 48.1 38.0 - 73.0 %    Lymph % 25.7 18.0 - 48.0 %    Mono % 23.2 (H) 4.0 - 15.0 %    Eosinophil % 0.5 0.0 - 8.0 %    Basophil % 2.2 (H) 0.0 - 1.9 %    Differential Method Automated    Comprehensive metabolic panel    Collection Time: 10/02/24 10:46 PM   Result Value Ref Range    Sodium 139 136 - 145 mmol/L    Potassium 3.8 3.5 - 5.1 mmol/L    Chloride 106 95 - 110 mmol/L    CO2 24 23 - 29 mmol/L    Glucose 78 70 - 110 mg/dL    BUN 26 (H) 8 - 23 mg/dL    Creatinine 1.1 0.5 - 1.4 mg/dL    Calcium  8.8 8.7 - 10.5 mg/dL    Total Protein 6.2 6.0 - 8.4 g/dL    Albumin 2.7 (L) 3.5 - 5.2 g/dL    Total Bilirubin 0.7 0.1 - 1.0 mg/dL    Alkaline Phosphatase 288 (H) 55 - 135 U/L     (H) 10 - 40 U/L    ALT 93 (H) 10 - 44 U/L    eGFR 54 (A) >60 mL/min/1.73 m^2    Anion Gap 9 8 - 16 mmol/L   Troponin I    Collection Time: 10/02/24 10:46 PM   Result Value Ref Range    Troponin I <0.006 0.000 - 0.026 ng/mL   Brain natriuretic peptide    Collection Time: 10/02/24 10:46 PM   Result Value Ref Range    BNP 14 0 - 99 pg/mL       RADIOLOGY  X-Ray Chest AP Portable    Result Date: 10/2/2024  EXAMINATION: XR CHEST AP PORTABLE CLINICAL HISTORY: CHF; TECHNIQUE: Single frontal view of the chest was performed. COMPARISON: Chest radiograph 05/28/2024, CTA chest 10/02/2024 FINDINGS: There is a left chest wall MediPort catheter in stable position.  Stable size and configuration of the cardiac silhouette.  There is aortic atherosclerosis.  Lungs demonstrate diffuse increased interstitial and parenchymal attenuation favored to reflect edema in the appropriate clinical setting.  There are bilateral pleural effusions, left more so than right.  No evidence of pneumothorax.  Osseous structures demonstrate mild degenerative changes.     Abnormal chest radiograph as above. Electronically signed by: Luis A Walker MD Date:    10/02/2024 Time:    23:12    CTA Chest Non-Coronary (PE Studies)    Result Date: 10/2/2024  Exam: CTA CHEST NON CORONARY (PE STUDIES) Comparison: 05/18/2024 Clinical Indication:  Concern for pulmonary emboli Technique: CT of the chest is performed after the administration of contrast. Data acquisition was obtained during the pulmonary arterial phase of enhancement.  CT Angiogram (CTA) of the chest was performed with 3D reconstruction. Sagittal, coronal and MIP images were also obtained. Findings: Central venous catheter on the left with the tip in the low SVC. Adequate bolus for evaluation.  No pulmonary emboli.  No  pericardial effusion or significant coronary artery calcifications. There are new enlarged mediastinal and hilar lymph nodes measuring up to 2 cm in the prevascular space and 2.5 cm inferior to the paresh.  Enlarged lymph node in the right hilar region measuring 2.1 cm.  No axillary lymphadenopathy. There are new moderate-sized bilateral pleural effusions with a loculated component extending along the left lateral hemithorax continuing into the major fissure.  Atelectasis or consolidation adjacent to the pleural effusions. There is interlobular septal thickening bilaterally with associated groundglass opacity.  Additional atelectasis or consolidation anterior to the right major fissure New irregular soft tissue mass in the left upper quadrant measuring approximately 6 cm..  Borderline-enlarged retroperitoneal lymph nodes adjacent to the superior mesenteric artery. No concerning lytic or sclerotic bony lesions.     1.  No pulmonary emboli. 2.  New moderate-sized bilateral pleural effusions as described above with adjacent atelectasis or consolidation. 3.  No enlarged mediastinal and hilar lymph nodes. 4.  Intralobular septal thickening and associated groundglass opacity bilaterally felt to represent edema. 5.  New irregular soft tissue mass in the left upper quadrant concerning for carcinomatosis. All CT scans at [this location] are performed using dose modulation techniques as appropriate to a performed exam including the following: automated exposure control; adjustment of the mA and/or kV according to patient size (this includes techniques or standardized protocols for targeted exams where dose is matched to indication / reason for exam; i.e. extremities or head); use of iterative reconstruction technique. Finalized on: 10/2/2024 10:52 PM By:  Gagan Abarca [facility]# [id number]      2024-10-02 22:54:[id number]    [facility]    Echo    Result Date: 9/3/2024    Left Ventricle: The left ventricle is normal in size. Normal wall  thickness. There is normal systolic function with a visually estimated ejection fraction of 60 - 65%. Biplane (2D) method of discs ejection fraction is 56%. Global longitudinal strain is -19.5%. There is normal diastolic function.   Right Ventricle: Normal right ventricular cavity size. Wall thickness is normal. Systolic function is normal.   IVC/SVC: Normal venous pressure at 3 mmHg. Malignant neoplasm of both ovaries Baseline echo 9/3/2024 NORM -19.47% Biplane 56%       EKG    MICROBIOLOGY    MDM     Amount and/or Complexity of Data Reviewed  Clinical lab tests: reviewed  Tests in the radiology section of CPT®: reviewed  Tests in the medicine section of CPT®: reviewed  Discussion of test results with the performing providers: yes  Decide to obtain previous medical records or to obtain history from someone other than the patient: yes  Obtain history from someone other than the patient: yes  Review and summarize past medical records: yes  Discuss the patient with other providers: yes  Independent visualization of images, tracings, or specimens: yes

## 2024-10-03 NOTE — TELEPHONE ENCOUNTER
Spoke with pt, informed pt that once she is discharged from the hospital she can call or office to get rescheduled or we can reach out to her. Pt verbalized her understanding .

## 2024-10-03 NOTE — ED NOTES
Pt resting in ER stretcher, aaox4, rr e/u, NAD noted. Pt remains on cardiac monitor with vss noted. Bed low and locked, call light in reach, side rails up x2.  Fluids infusing.  Breakfast tray at the bedside.  Pt verbalized understanding of status and POC; denies further needs. Will continue to monitor.

## 2024-10-03 NOTE — ASSESSMENT & PLAN NOTE
Followed by Dr. Pugh for Heme/Onc. Last evaluated by on 9/19/24. Last received Bevacizumab (9/20/24) and Doxorubicin/Bevacizumab in 2 weeks .  Plan:  -Heme/Onc consult

## 2024-10-03 NOTE — PHARMACY MED REC
"Admission Medication History     The home medication history was taken by Sherie Dueñas.    You may go to "Admission" then "Reconcile Home Medications" tabs to review and/or act upon these items.     The home medication list has been updated by the Pharmacy department.   Please read ALL comments highlighted in yellow.   Please address this information as you see fit.    Feel free to contact us if you have any questions or require assistance.      The medications listed below were removed from the home medication list. Please reorder if appropriate:  Patient reports no longer taking the following medication(s):  A[oxanam 5 mg  Lasix 40 mg  Potassium chloride SA 20 meq   Senna with docusate sodium 8.6-50 mg    Medications listed below were obtained from: Patient/family and Analytic software- Charge Payment      [facility] REC COMPLETED:     Sherie Dueñas  [id number]    Current Outpatient Medications on File Prior to Encounter   Medication Sig Dispense Refill Last Dose/Taking    albuterol (PROVENTIL/VENTOLIN HFA) 90 mcg/actuation inhaler Inhale 2 puffs into the lungs every 4 (four) hours as needed for Wheezing. Rescue 18 g 1 Past Month    amLODIPine (NORVASC) 5 MG tablet Take 2 tablets (10 mg total) by mouth once daily. 180 tablet 3 10/2/2024    atenoloL (TENORMIN) 25 MG tablet Take 25 mg by mouth once daily.   10/2/2024    biotin 1 mg tablet Take 1,000 mcg by mouth once daily.    10/2/2024    fluticasone propionate (FLONASE) 50 mcg/actuation nasal spray 1 spray (50 mcg total) by Each Nostril route every 12 (twelve) hours as needed for Rhinitis. 16 g 0 Past Week    gabapentin (NEURONTIN) 300 MG capsule Take 2 capsules (600 mg total) by mouth 3 (three) times daily. 180 capsule 3 10/2/2024    ginkgo biloba 40 mg Tab Take 40 mg by mouth once daily.   10/2/2024    multivitamin with minerals tablet Take 1 tablet by mouth once daily.    10/2/2024    olmesartan-hydrochlorothiazide (BENICAR HCT) 40-25 mg per tablet Take 1 tablet " by mouth once daily. 90 tablet 1 10/2/2024    rosuvastatin (CRESTOR) 10 MG tablet Take 1 tablet (10 mg total) by mouth once daily. 90 tablet 3 10/2/2024    [DISCONTINUED] apixaban (ELIQUIS DVT-PE TREAT 30D START) 5 mg (74 tabs) DsPk For the first 7 days take two 5 mg tablets twice daily.  After 7 days take one 5 mg tablet twice daily. 74 tablet 0 Past Month    [DISCONTINUED] atenoloL (TENORMIN) 25 MG tablet Take 1 tablet (25 mg total) by mouth once daily. 90 tablet 2 10/2/2024    [DISCONTINUED] potassium chloride SA (K-DUR,KLOR-CON) 20 MEQ tablet Take 1 tablet (20 mEq total) by mouth 2 (two) times daily. 2 tablet 0 10/2/2024    ALPRAZolam (XANAX) 0.25 MG tablet Take 1 tablet (0.25 mg total) by mouth 3 (three) times daily as needed for Anxiety. 60 tablet 2 More than a month    cetirizine (ZYRTEC) 10 MG tablet Take 1 tablet (10 mg total) by mouth once daily. (Patient taking differently: Take 10 mg by mouth as needed for Allergies.) 30 tablet 5 More than a month    diclofenac sodium (VOLTAREN) 1 % Gel Apply once a day to back as needed 100 g 1 Unknown    EPINEPHrine (EPIPEN) 0.3 mg/0.3 mL AtIn Inject 0.3 mLs (0.3 mg total) into the muscle once. for 1 dose 2 each 0     prochlorperazine (COMPAZINE) 5 MG tablet Take 1 tablet (5 mg total) by mouth every 6 (six) hours as needed for Nausea. 20 tablet 11 Unknown    sertraline (ZOLOFT) 25 MG tablet Take 1 tablet (25 mg total) by mouth once daily. 90 tablet 0 More than a month    traMADoL (ULTRAM) 50 mg tablet Take 1 tablet (50 mg total) by mouth every 12 (twelve) hours as needed for Pain. 30 tablet 0 More than a month                           .

## 2024-10-03 NOTE — ED PROVIDER NOTES
SCRIBE #1 NOTE: I, Jj Henson, am scribing for, and in the presence of, Leoncio Hoffman Jr., MD. I have scribed the entire note.       History     Chief Complaint   Patient presents with    Shortness of Breath     Shortness of breath and weakness gradually increasing over the past week. States she cannot walk short distances without having to sit down.      Review of patient's allergies indicates:  No Known Allergies      History of Present Illness     HPI    10/2/2024, 10:01 PM  History obtained from the patient      History of Present Illness: Casie Gaines is a 68 y.o. female patient with a PMHx of HTN, CHF, anemia, anxiety, hyperlipedemia, and ovarian cancer who presents to the Emergency Department for evaluation of shortness of breathe which onset gradually the past week.  Patient was notes that she was becomes short of breath when she was ambulating.  She denies any chest pain but did note some mild twinges earlier this week.  She has a history of a DVT in his currently on anticoagulation for this.  She was an underlying history of ovarian cancer and notes that the DVT develop postoperatively.  He was denies any pleuritic symptoms.  She denies any hemoptysis.  He was no fevers or chills.  She currently notes that she has been off of her Lasix for several months due to at dry now her lips.. Symptoms are constant and moderate in severity. No mitigating or exacerbating factors reported. No associated sxs. Patient denies any swelling, fever, chills, sore throat and all other sxs at this time. No prior Tx. Pt reports being on Eliquis because of stomach blood clot. Pt is on chemo, but not on dialysis. No further complaints or concerns at this time.       Arrival mode: Personal vehicle    PCP: Rossana Ang MD        Past Medical History:  Past Medical History:   Diagnosis Date    Anemia     Anxiety     Arthritis     knees    Cancer     ovarian    CHF (congestive heart failure)     Hx antineoplastic  chemotherapy     last 2019    Hyperlipemia     Hypertension     Neck pain     Ovarian cancer 2019    CHEMO    Peritoneal carcinomatosis 2019       Past Surgical History:  Past Surgical History:   Procedure Laterality Date    breast reduction  10/02/2018    CATARACT EXTRACTION Bilateral     2023     SECTION      X 1    COLONOSCOPY N/A 2021    Procedure: COLONOSCOPY;  Surgeon: Paulette Rojas MD;  Location: [facility] ENDO;  Service: Gastroenterology;  Laterality: N/A;    CYSTOSCOPY W/ URETERAL STENT PLACEMENT Right 2019    Procedure: CYSTOSCOPY, WITH URETERAL STENT INSERTION;  Surgeon: Timmy Santiago IV, MD;  Location: [facility] OR;  Service: Urology;  Laterality: Right;    CYSTOSCOPY W/ URETERAL STENT REMOVAL  10/04/2019    DILATION AND CURETTAGE OF UTERUS      HYSTERECTOMY      RALH for fibroids (still has ovaries)    INSERTION OF VENOUS ACCESS PORT Left 2019    Procedure: INSERTION, VENOUS ACCESS PORT;  Surgeon: Ulisses Monzon MD;  Location: [facility] OR;  Service: General;  Laterality: Left;  Left internal jugular     LYSIS OF ADHESIONS OF URETER N/A 08/15/2019    Procedure: URETEROLYSIS;  Surgeon: Ismael Juarez MD;  Location: [facility] OR;  Service: OB/GYN;  Laterality: N/A;    OMENTECTOMY N/A 08/15/2019    Procedure: OMENTECTOMY;  Surgeon: Ismael Juarez MD;  Location: [facility] OR;  Service: OB/GYN;  Laterality: N/A;    OOPHORECTOMY      RETROGRADE PYELOGRAPHY Right 2019    Procedure: PYELOGRAM, RETROGRADE;  Surgeon: Timmy Santiago IV, MD;  Location: [facility] OR;  Service: Urology;  Laterality: Right;    ROBOT-ASSISTED LAPAROSCOPIC SALPINGO-OOPHORECTOMY USING DA TIA XI Bilateral 08/15/2019    Procedure: XI ROBOTIC SALPINGO-OOPHORECTOMY;  Surgeon: Ismael Juarez MD;  Location: [facility] OR;  Service: OB/GYN;  Laterality: Bilateral;    TOTAL REDUCTION MAMMOPLASTY  2018    TUBAL LIGATION           Family History:  Family History   Problem Relation Name Age of Onset    Glaucoma Mother       No Known Problems Father      Colon cancer Brother      Breast cancer Maternal Aunt      Breast cancer Paternal Aunt      Ovarian cancer Paternal Aunt      Anesthesia problems Other cousin     Thrombophilia Neg Hx         Social History:  Social History     Tobacco Use    Smoking status: Former     Current packs/day: 0.00     Average packs/day: 1 pack/day for 25.0 years (25.0 ttl pk-yrs)     Types: Cigarettes     Start date: 10/1/1993     Quit date: 10/1/2018     Years since quittin.0    Smokeless tobacco: Never    Tobacco comments:     States started quit 2 months ago after 30 years   Substance and Sexual Activity    Alcohol use: Yes     Comment: occasionally  No alcohol 72h prior to sx    Drug use: No    Sexual activity: Not Currently     Partners: Male     Comment: hyst; mut monog        Review of Systems     Review of Systems   Constitutional:  Negative for chills and fever.   HENT:  Negative for sore throat.    Respiratory:  Positive for shortness of breath.    Cardiovascular:  Negative for chest pain.   Gastrointestinal:  Negative for nausea.   Genitourinary:  Negative for dysuria.   Musculoskeletal:  Negative for back pain and joint swelling.   Skin:  Negative for rash.   Neurological:  Negative for weakness.   Hematological:  Does not bruise/bleed easily.   All other systems reviewed and are negative.       Physical Exam     Initial Vitals [10/02/24 2124]   BP Pulse Resp Temp SpO2   137/67 106 20 98.4 °F (36.9 °C) (!) 85 %      MAP       --          Physical Exam  Nursing Notes and Vital Signs Reviewed.  Constitutional: Patient is in no acute distress. Well-developed and well-nourished.  Head: Atraumatic. Normocephalic.  Eyes:  EOM intact.  No scleral icterus.  ENT: Mucous membranes are moist.  Nares clear   Neck:  Full ROM. No JVD.  Cardiovascular: Regular rate. Regular rhythm No murmurs, rubs, or gallops. Distal pulses are 2+ and symmetric  Pulmonary/Chest: No respiratory distress. Clear to  auscultation bilaterally. No wheezing or rales.  Equal chest wall rise bilaterally  Abdominal: Soft and non-distended.  There is no tenderness.  No rebound, guarding, or rigidity. Good bowel sounds.  Genitourinary: No CVA tenderness.  No suprapubic tenderness  Musculoskeletal: Moves all extremities. No obvious deformities.  5 x 5 strength in all extremities   Skin: Warm and dry.  Neurological:  Alert, awake, and appropriate.  Normal speech.  No acute focal neurological deficits are appreciated.  Two through 12 intact bilaterally.  Psychiatric: Normal affect. Good eye contact. Appropriate in content.      ED Course   Critical Care    Date/Time: 10/15/2024 8:33 PM    Performed by: Leoncio Hoffman Jr., MD  Authorized by: Leoncio Hoffman Jr., MD  Direct patient critical care time: 12 minutes  Additional history critical care time: 6 minutes  Ordering / reviewing critical care time: 8 minutes  Documentation critical care time: 10 minutes  Consulting other physicians critical care time: 5 minutes  Total critical care time (exclusive of procedural time) : 41 minutes  Critical care time was exclusive of separately billable procedures and treating other patients and teaching time.  Critical care was necessary to treat or prevent imminent or life-threatening deterioration of the following conditions: respiratory failure.  Critical care was time spent personally by me on the following activities: development of treatment plan with patient or surrogate, discussions with consultants, interpretation of cardiac output measurements, evaluation of patient's response to treatment, examination of patient, obtaining history from patient or surrogate, ordering and performing treatments and interventions, ordering and review of laboratory studies, ordering and review of radiographic studies, pulse oximetry, re-evaluation of patient's condition and review of old charts.        ED Vital Signs:  Vitals:    10/06/24 2100 10/06/24 2120  10/06/24 2318 10/07/24 0002   BP: (!) 110/55   109/60   Pulse: 78 86 80 86   Resp: 18   18   Temp: 98.2 °F (36.8 °C)   98.9 °F (37.2 °C)   TempSrc:    Oral   SpO2: (!) 94%   (!) 88%   Weight:       Height:        10/07/24 0327 10/07/24 0431 10/07/24 0434 10/07/24 0723   BP:   (!) 102/59    Pulse: 77 77 81 76   Resp:   16    Temp:   97.8 °F (36.6 °C)    TempSrc:   Oral    SpO2:   (!) 91%    Weight:       Height:        10/07/24 0728 10/07/24 0817 10/07/24 0900 10/07/24 1100   BP:  127/61     Pulse:  84 90 70   Resp: 18 19     Temp:  98.2 °F (36.8 °C)     TempSrc:  Oral     SpO2: (!) 94% (!) 88%     Weight:       Height:        10/07/24 1226 10/07/24 1300 10/07/24 1500   BP: (!) 140/70     Pulse: 75 78 76   Resp: 20     Temp: 97.7 °F (36.5 °C)     TempSrc: Oral     SpO2: (!) 92%     Weight:      Height:          Abnormal Lab Results:  Labs Reviewed   CBC W/ AUTO DIFFERENTIAL - Abnormal       Result Value    WBC 3.70 (*)     RBC 3.97 (*)     Hemoglobin 11.2 (*)     Hematocrit 35.3 (*)     MCV 89      MCH 28.2      MCHC 31.7 (*)     RDW 18.4 (*)     Platelets 330      MPV 9.9      Immature Granulocytes 0.3      Gran # (ANC) 1.8      Immature Grans (Abs) 0.01      Lymph # 1.0      Mono # 0.9      Eos # 0.0      Baso # 0.08      nRBC 0      Gran % 48.1      Lymph % 25.7      Mono % 23.2 (*)     Eosinophil % 0.5      Basophil % 2.2 (*)     Differential Method Automated     COMPREHENSIVE METABOLIC PANEL - Abnormal    Sodium 139      Potassium 3.8      Chloride 106      CO2 24      Glucose 78      BUN 26 (*)     Creatinine 1.1      Calcium 8.8      Total Protein 6.2      Albumin 2.7 (*)     Total Bilirubin 0.7      Alkaline Phosphatase 288 (*)      (*)     ALT 93 (*)     eGFR 54 (*)     Anion Gap 9     CBC W/ AUTO DIFFERENTIAL - Abnormal    WBC 3.66 (*)     RBC 4.22      Hemoglobin 11.8 (*)     Hematocrit 38.0      MCV 90      MCH 28.0      MCHC 31.1 (*)     RDW 18.7 (*)     Platelets 327      MPV 10.6      Immature  Granulocytes 0.3      Gran # (ANC) 1.8      Immature Grans (Abs) 0.01      Lymph # 1.0      Mono # 0.8      Eos # 0.0      Baso # 0.09      nRBC 0      Gran % 47.7      Lymph % 26.0      Mono % 22.7 (*)     Eosinophil % 0.8      Basophil % 2.5 (*)     Differential Method Automated     COMPREHENSIVE METABOLIC PANEL - Abnormal    Sodium 140      Potassium 3.8      Chloride 105      CO2 24      Glucose 76      BUN 27 (*)     Creatinine 1.2      Calcium 9.0      Total Protein 6.6      Albumin 2.8 (*)     Total Bilirubin 0.7      Alkaline Phosphatase 311 (*)      (*)      (*)     eGFR 49 (*)     Anion Gap 11     RESPIRATORY INFECTION PANEL (PCR), NASOPHARYNGEAL    Respiratory Infection Panel Source NP swab      Adenovirus Not Detected      Coronavirus 229E, Common Cold Virus Not Detected      Coronavirus HKU1, Common Cold Virus Not Detected      Coronavirus NL63, Common Cold Virus Not Detected      Coronavirus OC43, Common Cold Virus Not Detected      SARS-CoV2 (COVID-19) Qualitative PCR Not Detected      Human Metapneumovirus Not Detected      Human Rhinovirus/Enterovirus Not Detected      Influenza A (subtypes H1, H1-2009,H3) Not Detected      Influenza B Not Detected      Parainfluenza Virus 1 Not Detected      Parainfluenza Virus 2 Not Detected      Parainfluenza Virus 3 Not Detected      Parainfluenza Virus 4 Not Detected      Respiratory Syncytial Virus Not Detected      Bordetella Parapertussis (FE5091) Not Detected      Bordetella pertussis (ptxP) Not Detected      Chlamydia pneumoniae Not Detected      Mycoplasma pneumoniae Not Detected      Narrative:     Assay not valid for lower respiratory specimens, alternate  testing required.   TROPONIN I    Troponin I <0.006     B-TYPE NATRIURETIC PEPTIDE    BNP 14     POCT GLUCOSE    POCT Glucose 86          All Lab Results:  Results for orders placed or performed during the hospital encounter of 10/02/24   EKG 12-lead    Collection Time: 10/02/24  9:30 PM    Result Value Ref Range    QRS Duration 70 ms    [facility] QTC Calculation 441 ms   CBC auto differential    Collection Time: 10/02/24 10:46 PM   Result Value Ref Range    WBC 3.70 (L) 3.90 - 12.70 K/uL    RBC 3.97 (L) 4.00 - 5.40 M/uL    Hemoglobin 11.2 (L) 12.0 - 16.0 g/dL    Hematocrit 35.3 (L) 37.0 - 48.5 %    MCV 89 82 - 98 fL    MCH 28.2 27.0 - 31.0 pg    MCHC 31.7 (L) 32.0 - 36.0 g/dL    RDW 18.4 (H) 11.5 - 14.5 %    Platelets 330 150 - 450 K/uL    MPV 9.9 9.2 - 12.9 fL    Immature Granulocytes 0.3 0.0 - 0.5 %    Gran # (ANC) 1.8 1.8 - 7.7 K/uL    Immature Grans (Abs) 0.01 0.00 - 0.04 K/uL    Lymph # 1.0 1.0 - 4.8 K/uL    Mono # 0.9 0.3 - 1.0 K/uL    Eos # 0.0 0.0 - 0.5 K/uL    Baso # 0.08 0.00 - 0.20 K/uL    nRBC 0 0 /100 WBC    Gran % 48.1 38.0 - 73.0 %    Lymph % 25.7 18.0 - 48.0 %    Mono % 23.2 (H) 4.0 - 15.0 %    Eosinophil % 0.5 0.0 - 8.0 %    Basophil % 2.2 (H) 0.0 - 1.9 %    Differential Method Automated    Comprehensive metabolic panel    Collection Time: 10/02/24 10:46 PM   Result Value Ref Range    Sodium 139 136 - 145 mmol/L    Potassium 3.8 3.5 - 5.1 mmol/L    Chloride 106 95 - 110 mmol/L    CO2 24 23 - 29 mmol/L    Glucose 78 70 - 110 mg/dL    BUN 26 (H) 8 - 23 mg/dL    Creatinine 1.1 0.5 - 1.4 mg/dL    Calcium 8.8 8.7 - 10.5 mg/dL    Total Protein 6.2 6.0 - 8.4 g/dL    Albumin 2.7 (L) 3.5 - 5.2 g/dL    Total Bilirubin 0.7 0.1 - 1.0 mg/dL    Alkaline Phosphatase 288 (H) 55 - 135 U/L     (H) 10 - 40 U/L    ALT 93 (H) 10 - 44 U/L    eGFR 54 (A) >60 mL/min/1.73 m^2    Anion Gap 9 8 - 16 mmol/L   Troponin I    Collection Time: 10/02/24 10:46 PM   Result Value Ref Range    Troponin I <0.006 0.000 - 0.026 ng/mL   Brain natriuretic peptide    Collection Time: 10/02/24 10:46 PM   Result Value Ref Range    BNP 14 0 - 99 pg/mL   Blood culture #2 **CANNOT BE ORDERED STAT**    Collection Time: 10/03/24 12:40 AM    Specimen: Peripheral, Upper Arm, Right; Blood   Result Value Ref Range    Blood Culture,  Routine No growth after 5 days.    Blood culture #1 **CANNOT BE ORDERED STAT**    Collection Time: 10/03/24 12:40 AM    Specimen: Peripheral, Hand, Left; Blood   Result Value Ref Range    Blood Culture, Routine No growth after 5 days.    Respiratory Infection Panel (PCR), Nasopharyngeal    Collection Time: 10/03/24  6:12 AM    Specimen: Nasopharyngeal Swab   Result Value Ref Range    Respiratory Infection Panel Source NP swab Not Detected    Adenovirus Not Detected Not Detected    Coronavirus 229E, Common Cold Virus Not Detected Not Detected    Coronavirus HKU1, Common Cold Virus Not Detected Not Detected    Coronavirus NL63, Common Cold Virus Not Detected Not Detected    Coronavirus OC43, Common Cold Virus Not Detected Not Detected    SARS-CoV2 (COVID-19) Qualitative PCR Not Detected Not Detected    Human Metapneumovirus Not Detected Not Detected    Human Rhinovirus/Enterovirus Not Detected Not Detected    Influenza A (subtypes H1, H1-2009,H3) Not Detected Not Detected    Influenza B Not Detected Not Detected    Parainfluenza Virus 1 Not Detected Not Detected    Parainfluenza Virus 2 Not Detected Not Detected    Parainfluenza Virus 3 Not Detected Not Detected    Parainfluenza Virus 4 Not Detected Not Detected    Respiratory Syncytial Virus Not Detected Not Detected    Bordetella Parapertussis (IR6480) Not Detected Not Detected    Bordetella pertussis (ptxP) Not Detected Not Detected    Chlamydia pneumoniae Not Detected Not Detected    Mycoplasma pneumoniae Not Detected Not Detected   CBC auto differential    Collection Time: 10/03/24  6:12 AM   Result Value Ref Range    WBC 3.66 (L) 3.90 - 12.70 K/uL    RBC 4.22 4.00 - 5.40 M/uL    Hemoglobin 11.8 (L) 12.0 - 16.0 g/dL    Hematocrit 38.0 37.0 - 48.5 %    MCV 90 82 - 98 fL    MCH 28.0 27.0 - 31.0 pg    MCHC 31.1 (L) 32.0 - 36.0 g/dL    RDW 18.7 (H) 11.5 - 14.5 %    Platelets 327 150 - 450 K/uL    MPV 10.6 9.2 - 12.9 fL    Immature Granulocytes 0.3 0.0 - 0.5 %    Gran  # (ANC) 1.8 1.8 - 7.7 K/uL    Immature Grans (Abs) 0.01 0.00 - 0.04 K/uL    Lymph # 1.0 1.0 - 4.8 K/uL    Mono # 0.8 0.3 - 1.0 K/uL    Eos # 0.0 0.0 - 0.5 K/uL    Baso # 0.09 0.00 - 0.20 K/uL    nRBC 0 0 /100 WBC    Gran % 47.7 38.0 - 73.0 %    Lymph % 26.0 18.0 - 48.0 %    Mono % 22.7 (H) 4.0 - 15.0 %    Eosinophil % 0.8 0.0 - 8.0 %    Basophil % 2.5 (H) 0.0 - 1.9 %    Differential Method Automated    Comprehensive metabolic panel    Collection Time: 10/03/24  6:12 AM   Result Value Ref Range    Sodium 140 136 - 145 mmol/L    Potassium 3.8 3.5 - 5.1 mmol/L    Chloride 105 95 - 110 mmol/L    CO2 24 23 - 29 mmol/L    Glucose 76 70 - 110 mg/dL    BUN 27 (H) 8 - 23 mg/dL    Creatinine 1.2 0.5 - 1.4 mg/dL    Calcium 9.0 8.7 - 10.5 mg/dL    Total Protein 6.6 6.0 - 8.4 g/dL    Albumin 2.8 (L) 3.5 - 5.2 g/dL    Total Bilirubin 0.7 0.1 - 1.0 mg/dL    Alkaline Phosphatase 311 (H) 55 - 135 U/L     (H) 10 - 40 U/L     (H) 10 - 44 U/L    eGFR 49 (A) >60 mL/min/1.73 m^2    Anion Gap 11 8 - 16 mmol/L   POCT glucose    Collection Time: 10/03/24  9:30 AM   Result Value Ref Range    POCT Glucose 86 70 - 110 mg/dL   CBC Auto Differential    Collection Time: 10/04/24  5:18 AM   Result Value Ref Range    WBC 3.93 3.90 - 12.70 K/uL    RBC 4.13 4.00 - 5.40 M/uL    Hemoglobin 11.3 (L) 12.0 - 16.0 g/dL    Hematocrit 36.6 (L) 37.0 - 48.5 %    MCV 89 82 - 98 fL    MCH 27.4 27.0 - 31.0 pg    MCHC 30.9 (L) 32.0 - 36.0 g/dL    RDW 18.4 (H) 11.5 - 14.5 %    Platelets 324 150 - 450 K/uL    MPV 10.2 9.2 - 12.9 fL    Immature Granulocytes 0.0 0.0 - 0.5 %    Gran # (ANC) 2.0 1.8 - 7.7 K/uL    Immature Grans (Abs) 0.00 0.00 - 0.04 K/uL    Lymph # 0.9 (L) 1.0 - 4.8 K/uL    Mono # 0.9 0.3 - 1.0 K/uL    Eos # 0.1 0.0 - 0.5 K/uL    Baso # 0.09 0.00 - 0.20 K/uL    nRBC 0 0 /100 WBC    Gran % 50.6 38.0 - 73.0 %    Lymph % 23.7 18.0 - 48.0 %    Mono % 21.9 (H) 4.0 - 15.0 %    Eosinophil % 1.5 0.0 - 8.0 %    Basophil % 2.3 (H) 0.0 - 1.9 %     Differential Method Automated    Comprehensive metabolic panel    Collection Time: 10/04/24  5:18 AM   Result Value Ref Range    Sodium 139 136 - 145 mmol/L    Potassium 4.0 3.5 - 5.1 mmol/L    Chloride 105 95 - 110 mmol/L    CO2 24 23 - 29 mmol/L    Glucose 75 70 - 110 mg/dL    BUN 30 (H) 8 - 23 mg/dL    Creatinine 1.1 0.5 - 1.4 mg/dL    Calcium 8.7 8.7 - 10.5 mg/dL    Total Protein 6.2 6.0 - 8.4 g/dL    Albumin 2.6 (L) 3.5 - 5.2 g/dL    Total Bilirubin 0.7 0.1 - 1.0 mg/dL    Alkaline Phosphatase 318 (H) 55 - 135 U/L     (H) 10 - 40 U/L     (H) 10 - 44 U/L    eGFR 54 (A) >60 mL/min/1.73 m^2    Anion Gap 10 8 - 16 mmol/L   CBC Auto Differential    Collection Time: 10/05/24  5:47 AM   Result Value Ref Range    WBC 4.09 3.90 - 12.70 K/uL    RBC 4.04 4.00 - 5.40 M/uL    Hemoglobin 11.3 (L) 12.0 - 16.0 g/dL    Hematocrit 35.5 (L) 37.0 - 48.5 %    MCV 88 82 - 98 fL    MCH 28.0 27.0 - 31.0 pg    MCHC 31.8 (L) 32.0 - 36.0 g/dL    RDW 18.1 (H) 11.5 - 14.5 %    Platelets 319 150 - 450 K/uL    MPV 10.0 9.2 - 12.9 fL    Immature Granulocytes 0.5 0.0 - 0.5 %    Gran # (ANC) 2.3 1.8 - 7.7 K/uL    Immature Grans (Abs) 0.02 0.00 - 0.04 K/uL    Lymph # 1.0 1.0 - 4.8 K/uL    Mono # 0.7 0.3 - 1.0 K/uL    Eos # 0.0 0.0 - 0.5 K/uL    Baso # 0.06 0.00 - 0.20 K/uL    nRBC 0 0 /100 WBC    Gran % 55.8 38.0 - 73.0 %    Lymph % 23.7 18.0 - 48.0 %    Mono % 17.8 (H) 4.0 - 15.0 %    Eosinophil % 0.7 0.0 - 8.0 %    Basophil % 1.5 0.0 - 1.9 %    Differential Method Automated    Comprehensive metabolic panel    Collection Time: 10/05/24  5:47 AM   Result Value Ref Range    Sodium 138 136 - 145 mmol/L    Potassium 4.3 3.5 - 5.1 mmol/L    Chloride 105 95 - 110 mmol/L    CO2 23 23 - 29 mmol/L    Glucose 115 (H) 70 - 110 mg/dL    BUN 34 (H) 8 - 23 mg/dL    Creatinine 1.3 0.5 - 1.4 mg/dL    Calcium 9.1 8.7 - 10.5 mg/dL    Total Protein 6.3 6.0 - 8.4 g/dL    Albumin 2.7 (L) 3.5 - 5.2 g/dL    Total Bilirubin 0.6 0.1 - 1.0 mg/dL    Alkaline  Phosphatase 328 (H) 55 - 135 U/L     (H) 10 - 40 U/L     (H) 10 - 44 U/L    eGFR 45 (A) >60 mL/min/1.73 m^2    Anion Gap 10 8 - 16 mmol/L   Magnesium    Collection Time: 10/05/24  5:47 AM   Result Value Ref Range    Magnesium 1.8 1.6 - 2.6 mg/dL     *Note: Due to a large number of results and/or encounters for the requested time period, some results have not been displayed. A complete set of results can be found in Results Review.        Imaging Results:  Imaging Results              X-Ray Chest AP Portable (Final result)  Result time 10/02/24 23:12:53      Final result by Luis A Walker MD (10/02/24 23:12:53)                   Impression:      Abnormal chest radiograph as above.      Electronically signed by: Luis A Walker MD  Date:    10/02/2024  Time:    23:12               Narrative:    EXAMINATION:  XR CHEST AP PORTABLE    CLINICAL HISTORY:  CHF;    TECHNIQUE:  Single frontal view of the chest was performed.    COMPARISON:  Chest radiograph 05/28/2024, CTA chest 10/02/2024    FINDINGS:  There is a left chest wall MediPort catheter in stable position.  Stable size and configuration of the cardiac silhouette.  There is aortic atherosclerosis.  Lungs demonstrate diffuse increased interstitial and parenchymal attenuation favored to reflect edema in the appropriate clinical setting.  There are bilateral pleural effusions, left more so than right.  No evidence of pneumothorax.  Osseous structures demonstrate mild degenerative changes.                                       CTA Chest Non-Coronary (PE Studies) (Final result)  Result time 10/02/24 22:52:02      Final result by Gagan Abarca DO (10/02/24 22:52:02)                   Impression:      1.  No pulmonary emboli.  2.  New moderate-sized bilateral pleural effusions as described above with adjacent atelectasis or consolidation.  3.  No enlarged mediastinal and hilar lymph nodes.  4.  Intralobular septal thickening and associated  groundglass opacity bilaterally felt to represent edema.  5.  New irregular soft tissue mass in the left upper quadrant concerning for carcinomatosis.    All CT scans at [this location] are performed using dose modulation techniques as appropriate to a performed exam including the following: automated exposure control; adjustment of the mA and/or kV according to patient size (this includes techniques or standardized protocols for targeted exams where dose is matched to indication / reason for exam; i.e. extremities or head); use of iterative reconstruction technique.    Finalized on: 10/2/2024 10:52 PM By:  Gagan Abarca  [facility]# [id number]      2024-10-02 22:54:13.859    [facility]               Narrative:    Exam: CTA CHEST NON CORONARY (PE STUDIES)    Comparison: 05/18/2024    Clinical Indication:  Concern for pulmonary emboli    Technique: CT of the chest is performed after the administration of contrast. Data acquisition was obtained during the pulmonary arterial phase of enhancement.  CT Angiogram (CTA) of the chest was performed with 3D reconstruction. Sagittal, coronal and MIP images were also obtained.    Findings: Central venous catheter on the left with the tip in the low SVC.    Adequate bolus for evaluation.  No pulmonary emboli.  No pericardial effusion or significant coronary artery calcifications.    There are new enlarged mediastinal and hilar lymph nodes measuring up to 2 cm in the prevascular space and 2.5 cm inferior to the paresh.  Enlarged lymph node in the right hilar region measuring 2.1 cm.  No axillary lymphadenopathy.    There are new moderate-sized bilateral pleural effusions with a loculated component extending along the left lateral hemithorax continuing into the major fissure.  Atelectasis or consolidation adjacent to the pleural effusions.    There is interlobular septal thickening bilaterally with associated groundglass opacity.  Additional atelectasis or consolidation anterior to the right  major fissure    New irregular soft tissue mass in the left upper quadrant measuring approximately 6 cm..  Borderline-enlarged retroperitoneal lymph nodes adjacent to the superior mesenteric artery.    No concerning lytic or sclerotic bony lesions.                                         The EKG was ordered, reviewed, and independently interpreted by the ED provider.  Interpretation time: 9:30  Rate: 100 BPM  Rhythm: normal sinus rhythm  Interpretation: Nonspecific T wave. No STEMI.    The Emergency Provider reviewed the vital signs and test results, which are outlined above.     ED Discussion       12:23 AM: Discussed case with Dr. Jacinto ([facility] Medicine). Dr. Jacinto agrees with current care and management of pt and accepts admission.   Admitting Service: [facility]  Admitting Physician: Dr. Jacinto  Admit to: Med/Tele inpt     12:28 AM: Re-evaluated pt. I have discussed test results, shared treatment plan, and the need for admission with patient and family at bedside. Pt and family express understanding at this time and agree with all information. All questions answered. Pt and family have no further questions or concerns at this time. Pt is ready for admit.     ED Course as of 10/15/24 2034   Wed Oct 02, 2024   2211 Cardiac monitor interpretation  Independent interpretation  Indication: Shortness of breath  Normal sinus rhythm.  Rate 94.  No STEMI [RT]      ED Course User Index  [RT] Leoncio Hoffman Jr., MD     Medical Decision Making  Differential diagnosis: Flu, COVID, COPD, angina, PE, pneumonia    Patient was evaluated history and physical examination.  Patient was has a history of ovarian cancer and gets chemotherapy.  He was complaining of shortness breath worse with walking.  Differential diagnosis includes ASCVD versus versus CHF.  Workup is benign save for some consolidation versus atelectasis of the bilateral base he was with some mild pleural effusions.  Clinically this is likely  pneumonia at her immunocompromised state she was being admitted to Hospital Medicine for further evaluation and treatment.    Amount and/or Complexity of Data Reviewed  Independent Historian: spouse  External Data Reviewed: labs and notes.  Labs: ordered. Decision-making details documented in ED Course.     Details: Troponin detect with a normal BNP.  She was elevated LFTs with normal renal function.  Electrolytes benign.  BNP is 14.  Normal white count.  Hemoglobin 11.2  Radiology: ordered. Decision-making details documented in ED Course.     Details: She was x-ray is clear.  CT imaging shows bibasilar consolidation versus atelectasis with a effusions.  It was also a mass in the abdomen  ECG/medicine tests: ordered and independent interpretation performed. Decision-making details documented in ED Course.     Details: No STEMI  Discussion of management or test interpretation with external provider(s): Discussed case with hospital medicine graciously accepts for admission    Risk  OTC drugs.  Prescription drug management.  Decision regarding hospitalization.  Diagnosis or treatment significantly limited by social determinants of health.    Critical Care  Total time providing critical care: 41 minutes                ED Medication(s):  Medications   furosemide injection 80 mg (80 mg Intravenous Given 10/2/24 2249)   iohexoL (OMNIPAQUE 350) injection 100 mL (100 mLs Intravenous Given 10/2/24 2227)   furosemide injection 40 mg (40 mg Intravenous Given 10/4/24 1119)   iohexoL (OMNIPAQUE 350) injection 100 mL (100 mLs Intravenous Given 10/6/24 1619)   iohexoL (OMNIPAQUE 12) oral solution 1,000 mL (1,000 mLs Oral Given 10/6/24 1619)       Discharge Medication List as of 10/7/2024  3:46 PM        START taking these medications    Details   cefdinir (OMNICEF) 300 MG capsule Take 1 capsule (300 mg total) by mouth 2 (two) times daily. for 2 days, Starting Tue 10/8/2024, Until Thu 10/10/2024, Normal              Contact  information for follow-up providers       Torri House MD Follow up in 1 week(s).    Specialties: Internal Medicine, Hematology and Oncology  Contact information:  [address]  [place] [address]  [phone]                       Contact information for after-discharge care       Durable Medical Equipment       [facility] .    Service: Durable Medical Equipment  Contact information:  [address] [place] [address]  [phone]                     Home Medical Care       [facility] .    Service: Home Health Services  Contact information:  [address]  [facility] [address]  [phone]                                       Scribe Attestation:   Scribe #1: I performed the above scribed service and the documentation accurately describes the services I performed. I attest to the accuracy of the note.     Attending:   Physician Attestation Statement for Scribe #1: I, Leoncio Hoffman Jr., MD, personally performed the services described in this documentation, as scribed by Jj Henson, in my presence, and it is both accurate and complete.           Clinical Impression       ICD-10-CM ICD-9-CM   1. Immunocompromised state  D84.9 279.9   2. Shortness of breath  R06.02 786.05   3. CAP (community acquired pneumonia)  J18.9 486   4. Chest pain  R07.9 786.50   5. History of ovarian cancer  Z85.43 V10.43   6. Acute respiratory failure with hypoxia  J96.01 518.81       Disposition:   Disposition: Admitted  Condition: Fair        Leoncio Hoffman Jr., MD  10/03/24 0039       Leoncio Hoffman Jr., MD  10/03/24 0118       Leoncio Hoffman Jr., MD  10/15/24 2034

## 2024-10-03 NOTE — ASSESSMENT & PLAN NOTE
Chronic, controlled. Latest blood pressure and vitals reviewed-     Temp:  [98.4 °F (36.9 °C)]   Pulse:  []   Resp:  [19-23]   BP: (123-139)/(63-78)   SpO2:  [85 %-99 %] .   Home meds for hypertension were reviewed and noted below.   Hypertension Medications               amLODIPine (NORVASC) 5 MG tablet Take 2 tablets (10 mg total) by mouth once daily.    atenoloL (TENORMIN) 25 MG tablet Take 1 tablet (25 mg total) by mouth once daily.    furosemide (LASIX) 40 MG tablet Take 1 tablet (40 mg total) by mouth once daily.    olmesartan-hydrochlorothiazide (BENICAR HCT) 40-25 mg per tablet Take 1 tablet by mouth once daily.            While in the hospital, will manage blood pressure as follows; Continue home antihypertensive regimen    Will utilize p.r.n. blood pressure medication only if patient's blood pressure greater than 160/100 and she develops symptoms such as worsening chest pain or shortness of breath.

## 2024-10-04 LAB
ALBUMIN SERPL BCP-MCNC: 2.6 G/DL (ref 3.5–5.2)
ALP SERPL-CCNC: 318 U/L (ref 55–135)
ALT SERPL W/O P-5'-P-CCNC: 113 U/L (ref 10–44)
ANION GAP SERPL CALC-SCNC: 10 MMOL/L (ref 8–16)
AST SERPL-CCNC: 225 U/L (ref 10–40)
BASOPHILS # BLD AUTO: 0.09 K/UL (ref 0–0.2)
BASOPHILS NFR BLD: 2.3 % (ref 0–1.9)
BILIRUB SERPL-MCNC: 0.7 MG/DL (ref 0.1–1)
BUN SERPL-MCNC: 30 MG/DL (ref 8–23)
CALCIUM SERPL-MCNC: 8.7 MG/DL (ref 8.7–10.5)
CHLORIDE SERPL-SCNC: 105 MMOL/L (ref 95–110)
CO2 SERPL-SCNC: 24 MMOL/L (ref 23–29)
CREAT SERPL-MCNC: 1.1 MG/DL (ref 0.5–1.4)
DIFFERENTIAL METHOD BLD: ABNORMAL
EOSINOPHIL # BLD AUTO: 0.1 K/UL (ref 0–0.5)
EOSINOPHIL NFR BLD: 1.5 % (ref 0–8)
ERYTHROCYTE [DISTWIDTH] IN BLOOD BY AUTOMATED COUNT: 18.4 % (ref 11.5–14.5)
EST. GFR  (NO RACE VARIABLE): 54 ML/MIN/1.73 M^2
GLUCOSE SERPL-MCNC: 75 MG/DL (ref 70–110)
HCT VFR BLD AUTO: 36.6 % (ref 37–48.5)
HGB BLD-MCNC: 11.3 G/DL (ref 12–16)
IMM GRANULOCYTES # BLD AUTO: 0 K/UL (ref 0–0.04)
IMM GRANULOCYTES NFR BLD AUTO: 0 % (ref 0–0.5)
LYMPHOCYTES # BLD AUTO: 0.9 K/UL (ref 1–4.8)
LYMPHOCYTES NFR BLD: 23.7 % (ref 18–48)
MCH RBC QN AUTO: 27.4 PG (ref 27–31)
MCHC RBC AUTO-ENTMCNC: 30.9 G/DL (ref 32–36)
MCV RBC AUTO: 89 FL (ref 82–98)
MONOCYTES # BLD AUTO: 0.9 K/UL (ref 0.3–1)
MONOCYTES NFR BLD: 21.9 % (ref 4–15)
NEUTROPHILS # BLD AUTO: 2 K/UL (ref 1.8–7.7)
NEUTROPHILS NFR BLD: 50.6 % (ref 38–73)
NRBC BLD-RTO: 0 /100 WBC
PLATELET # BLD AUTO: 324 K/UL (ref 150–450)
PMV BLD AUTO: 10.2 FL (ref 9.2–12.9)
POTASSIUM SERPL-SCNC: 4 MMOL/L (ref 3.5–5.1)
PROT SERPL-MCNC: 6.2 G/DL (ref 6–8.4)
RBC # BLD AUTO: 4.13 M/UL (ref 4–5.4)
SODIUM SERPL-SCNC: 139 MMOL/L (ref 136–145)
WBC # BLD AUTO: 3.93 K/UL (ref 3.9–12.7)

## 2024-10-04 PROCEDURE — 63600175 PHARM REV CODE 636 W HCPCS: Mod: HCNC | Performed by: HOSPITALIST

## 2024-10-04 PROCEDURE — 63600175 PHARM REV CODE 636 W HCPCS: Mod: HCNC | Performed by: NURSE PRACTITIONER

## 2024-10-04 PROCEDURE — 25000003 PHARM REV CODE 250: Mod: HCNC | Performed by: NURSE PRACTITIONER

## 2024-10-04 PROCEDURE — 36415 COLL VENOUS BLD VENIPUNCTURE: CPT | Mod: HCNC | Performed by: HOSPITALIST

## 2024-10-04 PROCEDURE — 99900035 HC TECH TIME PER 15 MIN (STAT): Mod: HCNC

## 2024-10-04 PROCEDURE — 25000003 PHARM REV CODE 250: Mod: HCNC | Performed by: EMERGENCY MEDICINE

## 2024-10-04 PROCEDURE — 27000221 HC OXYGEN, UP TO 24 HOURS: Mod: HCNC

## 2024-10-04 PROCEDURE — 11000001 HC ACUTE MED/SURG PRIVATE ROOM: Mod: HCNC

## 2024-10-04 PROCEDURE — 85025 COMPLETE CBC W/AUTO DIFF WBC: CPT | Mod: HCNC | Performed by: HOSPITALIST

## 2024-10-04 PROCEDURE — 80053 COMPREHEN METABOLIC PANEL: CPT | Mod: HCNC | Performed by: HOSPITALIST

## 2024-10-04 PROCEDURE — 94761 N-INVAS EAR/PLS OXIMETRY MLT: CPT | Mod: HCNC

## 2024-10-04 RX ORDER — FUROSEMIDE 10 MG/ML
40 INJECTION INTRAMUSCULAR; INTRAVENOUS ONCE
Status: COMPLETED | OUTPATIENT
Start: 2024-10-04 | End: 2024-10-04

## 2024-10-04 RX ADMIN — HYDROCHLOROTHIAZIDE 25 MG: 25 TABLET ORAL at 08:10

## 2024-10-04 RX ADMIN — PROMETHAZINE HYDROCHLORIDE 25 MG: 25 TABLET ORAL at 02:10

## 2024-10-04 RX ADMIN — FUROSEMIDE 40 MG: 10 INJECTION, SOLUTION INTRAMUSCULAR; INTRAVENOUS at 11:10

## 2024-10-04 RX ADMIN — CEFEPIME 2 G: 2 INJECTION, POWDER, FOR SOLUTION INTRAVENOUS at 01:10

## 2024-10-04 RX ADMIN — ONDANSETRON 4 MG: 2 INJECTION INTRAMUSCULAR; INTRAVENOUS at 10:10

## 2024-10-04 RX ADMIN — POTASSIUM CHLORIDE 20 MEQ: 1500 TABLET, EXTENDED RELEASE ORAL at 08:10

## 2024-10-04 RX ADMIN — CEFEPIME 2 G: 2 INJECTION, POWDER, FOR SOLUTION INTRAVENOUS at 12:10

## 2024-10-04 RX ADMIN — AMLODIPINE BESYLATE 10 MG: 10 TABLET ORAL at 08:10

## 2024-10-04 RX ADMIN — MUPIROCIN: 20 OINTMENT TOPICAL at 08:10

## 2024-10-04 RX ADMIN — MUPIROCIN: 20 OINTMENT TOPICAL at 09:10

## 2024-10-04 RX ADMIN — POTASSIUM CHLORIDE 20 MEQ: 1500 TABLET, EXTENDED RELEASE ORAL at 09:10

## 2024-10-04 RX ADMIN — ATENOLOL 25 MG: 25 TABLET ORAL at 08:10

## 2024-10-04 NOTE — PROGRESS NOTES
[facility] Medicine  Progress Note    Patient Name: Casie Gaines  MRN: [id number]  Patient Class: IP- Inpatient   Admission Date: 10/2/2024  Length of Stay: 1 days  Attending Physician: Ermias Pereira MD  Primary Care Provider: Rossana Ang MD        Subjective:     Principal Problem:CAP (community acquired pneumonia)        HPI:  Casie Gaines is a 68 y.o. female with a PMH  has a past medical history of Anemia, Anxiety, Arthritis, Cancer, CHF (congestive heart failure), antineoplastic chemotherapy, Hyperlipemia, Hypertension, Neck pain, Ovarian cancer (2019), and Peritoneal carcinomatosis (01/26/2019).presented to the Emergency Department for evaluation of shortness of breathe which onset gradually the past week.  Patient was notes that she was becomes short of breath when she was ambulating.  She denies any chest pain but did note some mild twinges earlier this week.  She has a history of a DVT in his currently on anticoagulation for this.  She was an underlying history of ovarian cancer and notes that the DVT develop postoperatively.  He was denies any pleuritic symptoms.  She denies any hemoptysis.  He was no fevers or chills.  She currently notes that she has been off of her Lasix for several months due to at dry now her lips.. Symptoms are constant and moderate in severity. No mitigating or exacerbating factors reported. No associated sxs. Patient denies any swelling, fever, chills, sore throat and all other sxs at this time. No prior Tx. Pt reports being on Eliquis because of stomach blood clot. Pt is on chemo, but not on dialysis. No further complaints or concerns at this time.     ER workup revealed CBC to be unremarkable.  CMP revealed slightly worsening liver enzymes with AST/ALT of 176/93 in his ALP of 288.  BNP and troponin negative.  UA negative. CXR revealed:  Lungs demonstrate diffuse increased interstitial and parenchymal attenuation favored to reflect edema in the  appropriate clinical setting.  There are bilateral pleural effusions, left more so than right.    CTA of the chest revealed:  1.  No pulmonary emboli.  2.  New moderate-sized bilateral pleural effusions as described above with adjacent atelectasis or consolidation.  3.  No enlarged mediastinal and hilar lymph nodes.  4.  Intralobular septal thickening and associated groundglass opacity bilaterally felt to represent edema.  5.  New irregular soft tissue mass in the left upper quadrant concerning for carcinomatosis.    EKG revealed normal sinus rhythm with nonspecific T-wave abnormalities with a ventricular rate of 100 beats per minute and a QT/QTC of 342/441.  Patient was initially hypoxic with room air O2 saturation of 85%, but did improve to 98% on 3 liters/minute via nasal cannula.  All remaining vital signs stable.  Patient received 2 g cefepime in ER in addition to 80 mg Lasix IV.  Hospital Medicine consulted to admit patient for community-acquired pneumonia and hypoxia.  Patient in agreement with treatment plan.  Patient will be admitted under inpatient status.    PCP: Rossana Ang      Overview/Hospital Course:    10/4/24  PITER, patient reports coughing, fatigue  On 3L NC, not on home O2  CT reviewed, will give one time dose Lasix 40 mg IV  Monitor I/O's  Continue IV antibiotics  Hold further IV fluids  Wean supplemental O2 as tolerated      Review of Systems   All other systems reviewed and are negative.    Objective:     Vital Signs (Most Recent):  Temp: 97.9 °F (36.6 °C) (10/04/24 1631)  Pulse: 75 (10/04/24 1631)  Resp: 17 (10/04/24 1631)  BP: 111/60 (10/04/24 1631)  SpO2: (!) 91 % (10/04/24 1631) Vital Signs (24h Range):  Temp:  [97.3 °F (36.3 °C)-98.1 °F (36.7 °C)] 97.9 °F (36.6 °C)  Pulse:  [70-84] 75  Resp:  [17-18] 17  SpO2:  [91 %-94 %] 91 %  BP: (102-118)/(55-64) 111/60     Weight: 104.8 kg (231 lb 0.7 oz)  Body mass index is 36.19 kg/m².    Intake/Output Summary (Last 24 hours) at 10/4/2024  1719  Last data filed at 10/4/2024 1500  Gross per 24 hour   Intake 240 ml   Output 700 ml   Net -460 ml         Physical Exam  Vitals and nursing note reviewed.   Constitutional:       General: She is not in acute distress.     Appearance: Normal appearance. She is normal weight. She is ill-appearing.   Cardiovascular:      Rate and Rhythm: Regular rhythm. Tachycardia present.      Heart sounds: No murmur heard.  Pulmonary:      Effort: No respiratory distress.      Breath sounds: Rhonchi and rales present. No wheezing.      Comments: 3L NC  Neurological:      General: No focal deficit present.      Mental Status: She is alert and oriented to person, place, and time.   Psychiatric:         Mood and Affect: Mood normal.         Behavior: Behavior normal.             Significant Labs: All pertinent labs within the past 24 hours have been reviewed.  Recent Lab Results         10/04/24  0518        Albumin 2.6                     Anion Gap 10              Baso # 0.09       Basophil % 2.3       BILIRUBIN TOTAL 0.7  Comment: For infants and newborns, interpretation of results should be based  on gestational age, weight and in agreement with clinical  observations.    Premature Infant recommended reference ranges:  Up to 24 hours.............<8.0 mg/dL  Up to 48 hours............<12.0 mg/dL  3-5 days..................<15.0 mg/dL  6-29 days.................<15.0 mg/dL         BUN 30       Calcium 8.7       Chloride 105       CO2 24       Creatinine 1.1       Differential Method Automated       eGFR 54       Eos # 0.1       Eos % 1.5       Glucose 75       Gran # (ANC) 2.0       Gran % 50.6       Hematocrit 36.6       Hemoglobin 11.3       Immature Grans (Abs) 0.00  Comment: Mild elevation in immature granulocytes is non specific and   can be seen in a variety of conditions including stress response,   acute inflammation, trauma and pregnancy. Correlation with other   laboratory and clinical findings is  essential.         Immature Granulocytes 0.0       Lymph # 0.9       Lymph % 23.7       MCH 27.4       MCHC 30.9       MCV 89       Mono # 0.9       Mono % 21.9       MPV 10.2       nRBC 0       Platelet Count 324       Potassium 4.0       PROTEIN TOTAL 6.2       RBC 4.13       RDW 18.4       Sodium 139       WBC 3.93               Significant Imaging: I have reviewed all pertinent imaging results/findings within the past 24 hours.    X-Ray Chest AP Portable   Final Result      Abnormal chest radiograph as above.         Electronically signed by: Luis A Walker MD   Date:    10/02/2024   Time:    23:12      CTA Chest Non-Coronary (PE Studies)   Final Result      1.  No pulmonary emboli.   2.  New moderate-sized bilateral pleural effusions as described above with adjacent atelectasis or consolidation.   3.  No enlarged mediastinal and hilar lymph nodes.   4.  Intralobular septal thickening and associated groundglass opacity bilaterally felt to represent edema.   5.  New irregular soft tissue mass in the left upper quadrant concerning for carcinomatosis.      All CT scans at [this location] are performed using dose modulation techniques as appropriate to a performed exam including the following: automated exposure control; adjustment of the mA and/or kV according to patient size (this includes techniques or standardized protocols for targeted exams where dose is matched to indication / reason for exam; i.e. extremities or head); use of iterative reconstruction technique.      Finalized on: 10/2/2024 10:52 PM By:  Gagan Abarca   BRRG# [id number]      2024-10-02 22:54:13.859    RG            Assessment/Plan:      * CAP (community acquired pneumonia)  Patient has a diagnosis of pneumonia. The cause of the pneumonia is unknown at this time. The pneumonia is worsening due to hypoxia . The patient has the following signs/symptoms of pneumonia: persistent hypoxia  and shortness of breath. The patient does have a current  oxygen requirement and the patient does not have a home oxygen requirement. I have reviewed the pertinent imaging. The following cultures have been collected: Blood cultures The culture results are listed below.     Current antimicrobial regimen consists of the antibiotics listed below. Will monitor patient closely and continue current treatment plan unchanged.    Antibiotics (From admission, onward)      Start     Stop Route Frequency Ordered    10/03/24 0900  mupirocin 2 % ointment         10/08/24 0859 Nasl 2 times daily 10/03/24 0035    10/03/24 0130  ceFEPIme (MAXIPIME) 2 g in D5W 100 mL IVPB (MB+)         -- IV Every 12 hours (non-standard times) 10/03/24 0017            Microbiology Results (last 7 days)       Procedure Component Value Units Date/Time    Blood culture #2 **CANNOT BE ORDERED STAT** [[id number]] Collected: 10/03/24 0040    Order Status: Completed Specimen: Blood from Peripheral, Upper Arm, Right Updated: 10/04/24 1212     Blood Culture, Routine No Growth to date      No Growth to date    Blood culture #1 **CANNOT BE ORDERED STAT** [[id number]] Collected: 10/03/24 0040    Order Status: Completed Specimen: Blood from Peripheral, Hand, Left Updated: 10/04/24 1212     Blood Culture, Routine No Growth to date      No Growth to date    Respiratory Infection Panel (PCR), Nasopharyngeal [[id number]] Collected: 10/03/24 0612    Order Status: Completed Specimen: Nasopharyngeal Swab Updated: 10/03/24 0842     Respiratory Infection Panel Source NP swab     Adenovirus Not Detected     Coronavirus 229E, Common Cold Virus Not Detected     Coronavirus HKU1, Common Cold Virus Not Detected     Coronavirus NL63, Common Cold Virus Not Detected     Coronavirus OC43, Common Cold Virus Not Detected     Comment: The Coronavirus strains detected in this test cause the common cold.  These strains are not the COVID-19 (novel Coronavirus)strain   associated with the respiratory disease outbreak.          SARS-CoV2  (COVID-19) Qualitative PCR Not Detected     Human Metapneumovirus Not Detected     Human Rhinovirus/Enterovirus Not Detected     Influenza A (subtypes H1, H1-2009,H3) Not Detected     Influenza B Not Detected     Parainfluenza Virus 1 Not Detected     Parainfluenza Virus 2 Not Detected     Parainfluenza Virus 3 Not Detected     Parainfluenza Virus 4 Not Detected     Respiratory Syncytial Virus Not Detected     Bordetella Parapertussis (GN2626) Not Detected     Bordetella pertussis (ptxP) Not Detected     Chlamydia pneumoniae Not Detected     Mycoplasma pneumoniae Not Detected     Comment: Respiratory Infection Panel testing performed by Multiplex PCR.       Narrative:      Assay not valid for lower respiratory specimens, alternate  testing required.            Hypoxia  Likely secondary to CAP.  Initiated on cefepime in ER.  Supplemental oxygen applied.  Satting 98% on 2 liters/minute via nasal cannula.  No acute distress.    Plan:   -supplemental oxygen to keep sats above 92%   -DuoNebs p.r.n.   -monitor pulse ox  -Obtain Respiratory panel      Peritoneal carcinomatosis  See Ovarian Cancer Tx plan above    Transaminitis  Trending upward likely secondary to chemo treatment.   Plan:  -trend labs  -avoid hepatotoxic agents  -Hepatology consult if warranted      Chemotherapy-induced peripheral neuropathy  Compliant with gabapentin.  Plan:  -Continue gabapentin      Ovarian cancer, bilateral  Followed by Dr. Pugh for Heme/Onc. Last evaluated by on 9/19/24. Last received Bevacizumab (9/20/24) and Doxorubicin/Bevacizumab in 2 weeks .  Plan:  -Heme/Onc consult      Hypertension  Chronic, controlled. Latest blood pressure and vitals reviewed-     Temp:  [97.3 °F (36.3 °C)-98.1 °F (36.7 °C)]   Pulse:  [70-84]   Resp:  [17-18]   BP: (102-118)/(55-64)   SpO2:  [91 %-94 %] .   Home meds for hypertension were reviewed and noted below.   Hypertension Medications               amLODIPine (NORVASC) 5 MG tablet Take 2 tablets (10  mg total) by mouth once daily.    atenoloL (TENORMIN) 25 MG tablet Take 1 tablet (25 mg total) by mouth once daily.    furosemide (LASIX) 40 MG tablet Take 1 tablet (40 mg total) by mouth once daily.    olmesartan-hydrochlorothiazide (BENICAR HCT) 40-25 mg per tablet Take 1 tablet by mouth once daily.            While in the hospital, will manage blood pressure as follows; Continue home antihypertensive regimen    Will utilize p.r.n. blood pressure medication only if patient's blood pressure greater than 160/100 and she develops symptoms such as worsening chest pain or shortness of breath.    Anxiety  Chronic. Stable. Not in acute exacerbation and currently denies endorsing any suicidal/homicidal ideations.   Plan:  -Continue home medications (xanax)        Hyperlipidemia  Patient is chronically on statin.will not continue for now. Last Lipid Panel:   Lab Results   Component Value Date    CHOL 162 06/06/2024    HDL 47 06/06/2024    LDLCALC 97.6 06/06/2024    TRIG 87 06/06/2024    CHOLHDL 29.0 06/06/2024     Plan:  -Hold home medication  -low fat/low calorie diet      VTE Risk Mitigation (From admission, onward)           Ordered     IP VTE HIGH RISK PATIENT  Once         10/03/24 0033     Place sequential compression device  Until discontinued         10/03/24 0033                    Discharge Planning   EVE:      Code Status: Full Code   Is the patient medically ready for discharge?:     Reason for patient still in hospital (select all that apply): Patient trending condition, Laboratory test, Treatment, and Consult recommendations  Discharge Plan A: Home with family                  Ermias Pereira MD  Department of Hospital Medicine   '[facility]

## 2024-10-04 NOTE — ASSESSMENT & PLAN NOTE
Patient has a diagnosis of pneumonia. The cause of the pneumonia is unknown at this time. The pneumonia is worsening due to hypoxia . The patient has the following signs/symptoms of pneumonia: persistent hypoxia  and shortness of breath. The patient does have a current oxygen requirement and the patient does not have a home oxygen requirement. I have reviewed the pertinent imaging. The following cultures have been collected: Blood cultures The culture results are listed below.     Current antimicrobial regimen consists of the antibiotics listed below. Will monitor patient closely and continue current treatment plan unchanged.    Antibiotics (From admission, onward)    Start     Stop Route Frequency Ordered    10/03/24 0900  mupirocin 2 % ointment         10/08/24 0859 Nasl 2 times daily 10/03/24 0035    10/03/24 0130  ceFEPIme (MAXIPIME) 2 g in D5W 100 mL IVPB (MB+)         -- IV Every 12 hours (non-standard times) 10/03/24 0017          Microbiology Results (last 7 days)     Procedure Component Value Units Date/Time    Blood culture #2 **CANNOT BE ORDERED STAT** [[id number]] Collected: 10/03/24 0040    Order Status: Completed Specimen: Blood from Peripheral, Upper Arm, Right Updated: 10/04/24 1212     Blood Culture, Routine No Growth to date      No Growth to date    Blood culture #1 **CANNOT BE ORDERED STAT** [[id number]] Collected: 10/03/24 0040    Order Status: Completed Specimen: Blood from Peripheral, Hand, Left Updated: 10/04/24 1212     Blood Culture, Routine No Growth to date      No Growth to date    Respiratory Infection Panel (PCR), Nasopharyngeal [[id number]] Collected: 10/03/24 0612    Order Status: Completed Specimen: Nasopharyngeal Swab Updated: 10/03/24 0842     Respiratory Infection Panel Source NP swab     Adenovirus Not Detected     Coronavirus 229E, Common Cold Virus Not Detected     Coronavirus HKU1, Common Cold Virus Not Detected     Coronavirus NL63, Common Cold Virus Not Detected      Coronavirus OC43, Common Cold Virus Not Detected     Comment: The Coronavirus strains detected in this test cause the common cold.  These strains are not the COVID-19 (novel Coronavirus)strain   associated with the respiratory disease outbreak.          SARS-CoV2 (COVID-19) Qualitative PCR Not Detected     Human Metapneumovirus Not Detected     Human Rhinovirus/Enterovirus Not Detected     Influenza A (subtypes H1, H1-2009,H3) Not Detected     Influenza B Not Detected     Parainfluenza Virus 1 Not Detected     Parainfluenza Virus 2 Not Detected     Parainfluenza Virus 3 Not Detected     Parainfluenza Virus 4 Not Detected     Respiratory Syncytial Virus Not Detected     Bordetella Parapertussis (OY5197) Not Detected     Bordetella pertussis (ptxP) Not Detected     Chlamydia pneumoniae Not Detected     Mycoplasma pneumoniae Not Detected     Comment: Respiratory Infection Panel testing performed by Multiplex PCR.       Narrative:      Assay not valid for lower respiratory specimens, alternate  testing required.

## 2024-10-04 NOTE — PLAN OF CARE
[facility] Surg  Initial Discharge Assessment       Primary Care Provider: Rossana Ang MD    Admission Diagnosis: Shortness of breath [R06.02]  CAP (community acquired pneumonia) [J18.9]  Immunocompromised state [D84.9]  History of ovarian cancer [Z85.43]  Chest pain [R07.9]    Admission Date: 10/2/2024  Expected Discharge Date: Per Attending     Transition of Care Barriers: None    Payor: HU[employer]NA MANAGED MEDICARE / Plan: [employer] In[employer] HMO PPO SPECIAL NEEDS / Product Type: Medicare Advantage /     Extended Emergency Contact Information  Primary Emergency Contact: Cornelio Gaines  Address: [facility] [address] [address] [facility]  Home Phone: [phone]  Mobile Phone: [phone]  Relation: Spouse  Secondary Emergency Contact: Prasad Frias/ Tiffanie  Address: [facility] [address] [address] [facility]  Home Phone: [phone]  Mobile Phone: [phone]  Relation: Other    Discharge Plan A: Home with family         [facility] [address] [place] [address]  Phone: [phone] Fax: [phone]    [id number]  [place] - [address] [facility] [address]  [place] [address]  Phone: [phone] Fax: [phone]    [id number] [place], [place] - [address] [facility]  [address]  [facility] [address]  Phone: [phone] Fax: [phone]      Initial Assessment (most recent)       Adult Discharge Assessment - 10/04/24 1151          Discharge Assessment    Assessment Type Discharge Planning Assessment     Confirmed/corrected address, phone number and insurance Yes     Confirmed Demographics Correct on Facesheet     Source of Information patient     Communicated EVE with patient/caregiver Date not available/Unable to determine     Reason For Admission CAP     People in Home  spouse     Do you expect to return to your current living situation? Yes     Do you have help at home or someone to help you manage your care at home? Yes     Who are your caregiver(s) and their phone number(s)? spouse     Prior to hospitilization cognitive status: Alert/Oriented     Current cognitive status: Alert/Oriented     Walking or Climbing Stairs Difficulty no     Dressing/Bathing Difficulty no     Home Layout Able to live on 1st floor     Equipment Currently Used at Home none     Readmission within 30 days? No     Patient currently being followed by outpatient case management? No     Do you currently have service(s) that help you manage your care at home? No     Do you take prescription medications? Yes     Do you have prescription coverage? Yes     Coverage [employer] Medicare     Do you have any problems affording any of your prescribed medications? No     Is the patient taking medications as prescribed? yes     Who is going to help you get home at discharge? spouse     How do you get to doctors appointments? car, drives self     Are you on dialysis? No     Do you take coumadin? No     Discharge Plan A Home with family     DME Needed Upon Discharge  none     Discharge Plan discussed with: Patient     Transition of Care Barriers None                      Anticipated DC dispo: Home   Prior Level of Function: Independent   People in home:  Spouse     Comments:  CM met with patient at bedside to introduce role and discuss discharge planning. Spouse will be help at home and can provide transport at time of discharge. CM discharge needs depends on hospital progress. CM will continue following to assist with other needs.

## 2024-10-04 NOTE — PLAN OF CARE
Discussed poc with pt, pt verbalized understanding    Purposeful rounding     VS wnl    Pt on 3L O2 via  NC    Cardiac monitoring in use tele monitor # [id number]    Fall precautions in place, remains injury free    Pt denies c/o anything    IVFs infusing LR @ 75 mL/hr    Abx given as prescribed    Bed locked at lowest position    Call light within reach    Chart check continued    Will cont with POC

## 2024-10-04 NOTE — CONSULTS
[facility] Surg  Hematology/Oncology  Consult Note    Patient Name: Casie Gaines  MRN: [id number]  Admission Date: 10/2/2024  Hospital Length of Stay: 0 days  Code Status: Full Code   Attending Provider: Ermias Pereira MD  Consulting Provider: Liz Salinas MD  Primary Care Physician: Rossana Ang MD  Principal Problem:CAP (community acquired pneumonia)    Inpatient consult to Hematology/Oncology  Consult performed by: Liz Salinas MD  Consult ordered by: Bob Jacinto NP  Reason for consult: pneumonia        Subjective:     HPI: 68 year old woman with history of Stage IV serous ovarian cancer. Currently on therapy bevacizumab/ liposomal doxorubicin. Cycle 1 9/6/2024.  Currently admitted for community acquired pneumonia. Chest imaging absent of pulmonary emboli, moderate sized bilateral pleural effusions. Intralobular septal thickening and ground glass opacities. New irregular LUQ soft tissue mass. Blood cultures NGTD. Respiratory infection panel negative. Currently on cefepime 2g every 12 hours. Patient is not neutropenic.    Oncology Treatment Plan:   OP GYN bevacizumab liposomal DOXOrubicin Q4W    Medications:  Continuous Infusions:   lactated ringers   Intravenous Continuous 75 mL/hr at 10/03/24 1603 New Bag at 10/03/24 1603     Scheduled Meds:   amLODIPine  10 mg Oral Daily    atenoloL  25 mg Oral Daily    ceFEPime IV (PEDS and ADULTS)  2 g Intravenous Q12H    gabapentin  600 mg Oral TID    hydroCHLOROthiazide  25 mg Oral Daily    mupirocin   Nasal BID    potassium chloride SA  20 mEq Oral BID     PRN Meds:  Current Facility-Administered Medications:     albuterol-ipratropium, 3 mL, Nebulization, Q4H PRN    ALPRAZolam, 0.25 mg, Oral, TID PRN    aluminum-magnesium hydroxide-simethicone, 30 mL, Oral, QID PRN    dextrose 10%, 12.5 g, Intravenous, PRN    dextrose 10%, 25 g, Intravenous, PRN    glucagon (human recombinant), 1 mg, Intramuscular, PRN    glucose, 16 g, Oral, PRN    glucose, 24 g, Oral,  PRN    HYDROcodone-acetaminophen, 1 tablet, Oral, Q6H PRN    melatonin, 6 mg, Oral, Nightly PRN    morphine, 2 mg, Intravenous, Q4H PRN    naloxone, 0.02 mg, Intravenous, PRN    ondansetron, 4 mg, Intravenous, Q8H PRN    polyethylene glycol, 17 g, Oral, Daily PRN    promethazine, 25 mg, Oral, Q6H PRN    simethicone, 1 tablet, Oral, QID PRN    sodium chloride 0.9%, 3 mL, Intravenous, Q12H PRN     Review of patient's allergies indicates:  No Known Allergies     Past Medical History:   Diagnosis Date    Anemia     Anxiety     Arthritis     knees    Cancer     ovarian    CHF (congestive heart failure)     Hx antineoplastic chemotherapy     last 2019    Hyperlipemia     Hypertension     Neck pain     Ovarian cancer     CHEMO    Peritoneal carcinomatosis 2019     Past Surgical History:   Procedure Laterality Date    breast reduction  10/02/2018    CATARACT EXTRACTION Bilateral     2023     SECTION      X 1    COLONOSCOPY N/A 2021    Procedure: COLONOSCOPY;  Surgeon: Paulette Rojas MD;  Location: [facility];  Service: Gastroenterology;  Laterality: N/A;    CYSTOSCOPY W/ URETERAL STENT PLACEMENT Right 2019    Procedure: CYSTOSCOPY, WITH URETERAL STENT INSERTION;  Surgeon: Timmy Santiago IV, MD;  Location: [facility] OR;  Service: Urology;  Laterality: Right;    CYSTOSCOPY W/ URETERAL STENT REMOVAL  10/04/2019    DILATION AND CURETTAGE OF UTERUS      HYSTERECTOMY      RALH for fibroids (still has ovaries)    INSERTION OF VENOUS ACCESS PORT Left 2019    Procedure: INSERTION, VENOUS ACCESS PORT;  Surgeon: Ulisses Monzon MD;  Location: [facility] OR;  Service: General;  Laterality: Left;  Left internal jugular     LYSIS OF ADHESIONS OF URETER N/A 08/15/2019    Procedure: URETEROLYSIS;  Surgeon: Ismael Juarez MD;  Location: [facility] OR;  Service: OB/GYN;  Laterality: N/A;    OMENTECTOMY N/A 08/15/2019    Procedure: OMENTECTOMY;  Surgeon: Ismael Juarez MD;  Location: [facility] OR;  Service:  OB/GYN;  Laterality: N/A;    OOPHORECTOMY      RETROGRADE PYELOGRAPHY Right 2019    Procedure: PYELOGRAM, RETROGRADE;  Surgeon: Timmy Santiago IV, MD;  Location: [facility] OR;  Service: Urology;  Laterality: Right;    ROBOT-ASSISTED LAPAROSCOPIC SALPINGO-OOPHORECTOMY USING DA TIA XI Bilateral 08/15/2019    Procedure: XI ROBOTIC SALPINGO-OOPHORECTOMY;  Surgeon: Ismael Juarez MD;  Location: [facility] [place] OR;  Service: OB/GYN;  Laterality: Bilateral;    TOTAL REDUCTION MAMMOPLASTY  2018    TUBAL LIGATION       Family History       Problem Relation (Age of Onset)    Anesthesia problems Other    Breast cancer Maternal Aunt, Paternal Aunt    Colon cancer Brother    Glaucoma Mother    No Known Problems Father    Ovarian cancer Paternal Aunt          Tobacco Use    Smoking status: Former     Current packs/day: 0.00     Average packs/day: 1 pack/day for 25.0 years (25.0 ttl pk-yrs)     Types: Cigarettes     Start date: 10/1/1993     Quit date: 10/1/2018     Years since quittin.0    Smokeless tobacco: Never    Tobacco comments:     States started quit 2 months ago after 30 years   Substance and Sexual Activity    Alcohol use: Yes     Comment: occasionally  No alcohol 72h prior to sx    Drug use: No    Sexual activity: Not Currently     Partners: Male     Comment: hyst; mut monog       Review of Systems   Constitutional:  Positive for fatigue.   HENT: Negative.     Eyes: Negative.    Respiratory:  Positive for cough and shortness of breath.    Cardiovascular: Negative.    Gastrointestinal: Negative.    Endocrine: Negative.    Genitourinary: Negative.    Musculoskeletal: Negative.    Skin: Negative.    Allergic/Immunologic: Negative for environmental allergies, food allergies and immunocompromised state.   Neurological: Negative.    Hematological:  Negative for adenopathy. Does not bruise/bleed easily.   Psychiatric/Behavioral: Negative.       Objective:     Vital Signs (Most Recent):  Temp: 98 °F (36.7 °C) (10/03/24  1535)  Pulse: 71 (10/03/24 1755)  Resp: 17 (10/03/24 1535)  BP: 125/68 (10/03/24 1535)  SpO2: (!) 93 % (10/03/24 1535) Vital Signs (24h Range):  Temp:  [98 °F (36.7 °C)-98.4 °F (36.9 °C)] 98 °F (36.7 °C)  Pulse:  [] 71  Resp:  [14-23] 17  SpO2:  [85 %-99 %] 93 %  BP: (101-139)/(56-79) 125/68     Weight: 105.3 kg (232 lb 2.3 oz)  Body mass index is 36.36 kg/m².  Body surface area is 2.23 meters squared.      Intake/Output Summary (Last 24 hours) at 10/3/2024 1928  Last data filed at 10/3/2024 0654  Gross per 24 hour   Intake 100 ml   Output 1500 ml   Net -1400 ml       Physical Exam telehealth visit    Significant Labs:   CBC:   Recent Labs   Lab 10/02/24  1152 10/02/24  2246 10/03/24  0612   WBC 3.88* 3.70* 3.66*   HGB 12.4 11.2* 11.8*   HCT 40.5 35.3* 38.0    330 327    and CMP:   Recent Labs   Lab 10/02/24  1152 10/02/24  2246 10/03/24  0612    139 140   K 4.1 3.8 3.8    106 105   CO2 24 24 24   GLU 92 78 76   BUN 26* 26* 27*   CREATININE 1.2 1.1 1.2   CALCIUM 9.5 8.8 9.0   PROT 7.1 6.2 6.6   ALBUMIN 3.1* 2.7* 2.8*   BILITOT 0.7 0.7 0.7   ALKPHOS 305* 288* 311*   * 176* 190*   * 93* 101*   ANIONGAP 12 9 11       Diagnostic Results:  I have reviewed all pertinent imaging results/findings within the past 24 hours.    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  Noted appts in clinic 10/4; will notify cancer center appts will need to be rescheduled.    Thank you for your consult. I will sign off. Please contact us if you have any additional questions.    Liz Salinas MD  Hematology/Oncology  [facility] S[facility]

## 2024-10-04 NOTE — SUBJECTIVE & OBJECTIVE
Review of Systems   All other systems reviewed and are negative.    Objective:     Vital Signs (Most Recent):  Temp: 97.9 °F (36.6 °C) (10/04/24 1631)  Pulse: 75 (10/04/24 1631)  Resp: 17 (10/04/24 1631)  BP: 111/60 (10/04/24 1631)  SpO2: (!) 91 % (10/04/24 1631) Vital Signs (24h Range):  Temp:  [97.3 °F (36.3 °C)-98.1 °F (36.7 °C)] 97.9 °F (36.6 °C)  Pulse:  [70-84] 75  Resp:  [17-18] 17  SpO2:  [91 %-94 %] 91 %  BP: (102-118)/(55-64) 111/60     Weight: 104.8 kg (231 lb 0.7 oz)  Body mass index is 36.19 kg/m².    Intake/Output Summary (Last 24 hours) at 10/4/2024 1719  Last data filed at 10/4/2024 1500  Gross per 24 hour   Intake 240 ml   Output 700 ml   Net -460 ml         Physical Exam  Vitals and nursing note reviewed.   Constitutional:       General: She is not in acute distress.     Appearance: Normal appearance. She is normal weight. She is ill-appearing.   Cardiovascular:      Rate and Rhythm: Regular rhythm. Tachycardia present.      Heart sounds: No murmur heard.  Pulmonary:      Effort: No respiratory distress.      Breath sounds: Rhonchi and rales present. No wheezing.      Comments: 3L NC  Neurological:      General: No focal deficit present.      Mental Status: She is alert and oriented to person, place, and time.   Psychiatric:         Mood and Affect: Mood normal.         Behavior: Behavior normal.             Significant Labs: All pertinent labs within the past 24 hours have been reviewed.  Recent Lab Results         10/04/24  0518        Albumin 2.6                     Anion Gap 10              Baso # 0.09       Basophil % 2.3       BILIRUBIN TOTAL 0.7  Comment: For infants and newborns, interpretation of results should be based  on gestational age, weight and in agreement with clinical  observations.    Premature Infant recommended reference ranges:  Up to 24 hours.............<8.0 mg/dL  Up to 48 hours............<12.0 mg/dL  3-5 days..................<15.0 mg/dL  6-29  days.................<15.0 mg/dL         BUN 30       Calcium 8.7       Chloride 105       CO2 24       Creatinine 1.1       Differential Method Automated       eGFR 54       Eos # 0.1       Eos % 1.5       Glucose 75       Gran # (ANC) 2.0       Gran % 50.6       Hematocrit 36.6       Hemoglobin 11.3       Immature Grans (Abs) 0.00  Comment: Mild elevation in immature granulocytes is non specific and   can be seen in a variety of conditions including stress response,   acute inflammation, trauma and pregnancy. Correlation with other   laboratory and clinical findings is essential.         Immature Granulocytes 0.0       Lymph # 0.9       Lymph % 23.7       MCH 27.4       MCHC 30.9       MCV 89       Mono # 0.9       Mono % 21.9       MPV 10.2       nRBC 0       Platelet Count 324       Potassium 4.0       PROTEIN TOTAL 6.2       RBC 4.13       RDW 18.4       Sodium 139       WBC 3.93               Significant Imaging: I have reviewed all pertinent imaging results/findings within the past 24 hours.    X-Ray Chest AP Portable   Final Result      Abnormal chest radiograph as above.         Electronically signed by: Luis A Walker MD   Date:    10/02/2024   Time:    23:12      CTA Chest Non-Coronary (PE Studies)   Final Result      1.  No pulmonary emboli.   2.  New moderate-sized bilateral pleural effusions as described above with adjacent atelectasis or consolidation.   3.  No enlarged mediastinal and hilar lymph nodes.   4.  Intralobular septal thickening and associated groundglass opacity bilaterally felt to represent edema.   5.  New irregular soft tissue mass in the left upper quadrant concerning for carcinomatosis.      All CT scans at [this location] are performed using dose modulation techniques as appropriate to a performed exam including the following: automated exposure control; adjustment of the mA and/or kV according to patient size (this includes techniques or standardized protocols for targeted exams  where dose is matched to indication / reason for exam; i.e. extremities or head); use of iterative reconstruction technique.      Finalized on: 10/2/2024 10:52 PM By:  Gagan Abarca   BRRKYLER# [id number]      2024-10-02 22:54:13.859    JEREMIAS

## 2024-10-04 NOTE — ASSESSMENT & PLAN NOTE
Chronic, controlled. Latest blood pressure and vitals reviewed-     Temp:  [97.3 °F (36.3 °C)-98.1 °F (36.7 °C)]   Pulse:  [70-84]   Resp:  [17-18]   BP: (102-118)/(55-64)   SpO2:  [91 %-94 %] .   Home meds for hypertension were reviewed and noted below.   Hypertension Medications               amLODIPine (NORVASC) 5 MG tablet Take 2 tablets (10 mg total) by mouth once daily.    atenoloL (TENORMIN) 25 MG tablet Take 1 tablet (25 mg total) by mouth once daily.    furosemide (LASIX) 40 MG tablet Take 1 tablet (40 mg total) by mouth once daily.    olmesartan-hydrochlorothiazide (BENICAR HCT) 40-25 mg per tablet Take 1 tablet by mouth once daily.            While in the hospital, will manage blood pressure as follows; Continue home antihypertensive regimen    Will utilize p.r.n. blood pressure medication only if patient's blood pressure greater than 160/100 and she develops symptoms such as worsening chest pain or shortness of breath.

## 2024-10-04 NOTE — HOSPITAL COURSE
Patient was admitted for shortness breath secondary to pneumonia.  She was started on empiric cefepime.  Symptoms improved.  Home O2 eval performed and patient qualified.  Cefepime was transitioned to p.o. cefdinir.  Of note imaging was concerning for worsening metastatic disease.  Patient currently being seen by Hematology outpatient.  Has received 1/3 treatment of current chemotherapy regimen.  Recommended patient follow-up with Hematology to complete treatment course and to get reimaging afterwards.  Patient voiced understanding.  She was discharged home with home health.

## 2024-10-05 LAB
ALBUMIN SERPL BCP-MCNC: 2.7 G/DL (ref 3.5–5.2)
ALP SERPL-CCNC: 328 U/L (ref 55–135)
ALT SERPL W/O P-5'-P-CCNC: 127 U/L (ref 10–44)
ANION GAP SERPL CALC-SCNC: 10 MMOL/L (ref 8–16)
AST SERPL-CCNC: 238 U/L (ref 10–40)
BASOPHILS # BLD AUTO: 0.06 K/UL (ref 0–0.2)
BASOPHILS NFR BLD: 1.5 % (ref 0–1.9)
BILIRUB SERPL-MCNC: 0.6 MG/DL (ref 0.1–1)
BUN SERPL-MCNC: 34 MG/DL (ref 8–23)
CALCIUM SERPL-MCNC: 9.1 MG/DL (ref 8.7–10.5)
CHLORIDE SERPL-SCNC: 105 MMOL/L (ref 95–110)
CO2 SERPL-SCNC: 23 MMOL/L (ref 23–29)
CREAT SERPL-MCNC: 1.3 MG/DL (ref 0.5–1.4)
DIFFERENTIAL METHOD BLD: ABNORMAL
EOSINOPHIL # BLD AUTO: 0 K/UL (ref 0–0.5)
EOSINOPHIL NFR BLD: 0.7 % (ref 0–8)
ERYTHROCYTE [DISTWIDTH] IN BLOOD BY AUTOMATED COUNT: 18.1 % (ref 11.5–14.5)
EST. GFR  (NO RACE VARIABLE): 45 ML/MIN/1.73 M^2
GLUCOSE SERPL-MCNC: 115 MG/DL (ref 70–110)
HCT VFR BLD AUTO: 35.5 % (ref 37–48.5)
HGB BLD-MCNC: 11.3 G/DL (ref 12–16)
IMM GRANULOCYTES # BLD AUTO: 0.02 K/UL (ref 0–0.04)
IMM GRANULOCYTES NFR BLD AUTO: 0.5 % (ref 0–0.5)
LYMPHOCYTES # BLD AUTO: 1 K/UL (ref 1–4.8)
LYMPHOCYTES NFR BLD: 23.7 % (ref 18–48)
MAGNESIUM SERPL-MCNC: 1.8 MG/DL (ref 1.6–2.6)
MCH RBC QN AUTO: 28 PG (ref 27–31)
MCHC RBC AUTO-ENTMCNC: 31.8 G/DL (ref 32–36)
MCV RBC AUTO: 88 FL (ref 82–98)
MONOCYTES # BLD AUTO: 0.7 K/UL (ref 0.3–1)
MONOCYTES NFR BLD: 17.8 % (ref 4–15)
NEUTROPHILS # BLD AUTO: 2.3 K/UL (ref 1.8–7.7)
NEUTROPHILS NFR BLD: 55.8 % (ref 38–73)
NRBC BLD-RTO: 0 /100 WBC
PLATELET # BLD AUTO: 319 K/UL (ref 150–450)
PMV BLD AUTO: 10 FL (ref 9.2–12.9)
POTASSIUM SERPL-SCNC: 4.3 MMOL/L (ref 3.5–5.1)
PROT SERPL-MCNC: 6.3 G/DL (ref 6–8.4)
RBC # BLD AUTO: 4.04 M/UL (ref 4–5.4)
SODIUM SERPL-SCNC: 138 MMOL/L (ref 136–145)
WBC # BLD AUTO: 4.09 K/UL (ref 3.9–12.7)

## 2024-10-05 PROCEDURE — 63600175 PHARM REV CODE 636 W HCPCS: Mod: HCNC | Performed by: NURSE PRACTITIONER

## 2024-10-05 PROCEDURE — 83735 ASSAY OF MAGNESIUM: CPT | Mod: HCNC | Performed by: HOSPITALIST

## 2024-10-05 PROCEDURE — 85025 COMPLETE CBC W/AUTO DIFF WBC: CPT | Mod: HCNC | Performed by: HOSPITALIST

## 2024-10-05 PROCEDURE — 80053 COMPREHEN METABOLIC PANEL: CPT | Mod: HCNC | Performed by: HOSPITALIST

## 2024-10-05 PROCEDURE — 25000003 PHARM REV CODE 250: Mod: HCNC | Performed by: EMERGENCY MEDICINE

## 2024-10-05 PROCEDURE — 36415 COLL VENOUS BLD VENIPUNCTURE: CPT | Mod: HCNC | Performed by: HOSPITALIST

## 2024-10-05 PROCEDURE — 11000001 HC ACUTE MED/SURG PRIVATE ROOM: Mod: HCNC

## 2024-10-05 PROCEDURE — 25000003 PHARM REV CODE 250: Mod: HCNC | Performed by: NURSE PRACTITIONER

## 2024-10-05 RX ADMIN — MUPIROCIN: 20 OINTMENT TOPICAL at 09:10

## 2024-10-05 RX ADMIN — ATENOLOL 25 MG: 25 TABLET ORAL at 09:10

## 2024-10-05 RX ADMIN — CEFEPIME 2 G: 2 INJECTION, POWDER, FOR SOLUTION INTRAVENOUS at 01:10

## 2024-10-05 RX ADMIN — HYDROCHLOROTHIAZIDE 25 MG: 25 TABLET ORAL at 09:10

## 2024-10-05 RX ADMIN — POTASSIUM CHLORIDE 20 MEQ: 1500 TABLET, EXTENDED RELEASE ORAL at 09:10

## 2024-10-05 RX ADMIN — AMLODIPINE BESYLATE 10 MG: 10 TABLET ORAL at 09:10

## 2024-10-05 RX ADMIN — ONDANSETRON 4 MG: 2 INJECTION INTRAMUSCULAR; INTRAVENOUS at 12:10

## 2024-10-05 NOTE — SUBJECTIVE & OBJECTIVE
Review of Systems   All other systems reviewed and are negative.    Objective:     Vital Signs (Most Recent):  Temp: 97.7 °F (36.5 °C) (10/05/24 0836)  Pulse: 70 (10/05/24 1130)  Resp: 18 (10/05/24 0836)  BP: 115/60 (10/05/24 0836)  SpO2: 97 % (10/05/24 1100) Vital Signs (24h Range):  Temp:  [97.5 °F (36.4 °C)-98.5 °F (36.9 °C)] 97.7 °F (36.5 °C)  Pulse:  [69-78] 70  Resp:  [17-18] 18  SpO2:  [91 %-97 %] 97 %  BP: ()/(57-60) 115/60     Weight: 104.8 kg (231 lb 0.7 oz)  Body mass index is 36.19 kg/m².    Intake/Output Summary (Last 24 hours) at 10/5/2024 1412  Last data filed at 10/4/2024 1500  Gross per 24 hour   Intake --   Output 400 ml   Net -400 ml         Physical Exam  Vitals and nursing note reviewed.   Constitutional:       General: She is not in acute distress.     Appearance: Normal appearance. She is normal weight. She is ill-appearing.   Cardiovascular:      Rate and Rhythm: Regular rhythm. Tachycardia present.      Heart sounds: No murmur heard.  Pulmonary:      Effort: No respiratory distress.      Breath sounds: Rhonchi and rales present. No wheezing.      Comments: 3L NC  Neurological:      General: No focal deficit present.      Mental Status: She is alert and oriented to person, place, and time.   Psychiatric:         Mood and Affect: Mood normal.         Behavior: Behavior normal.             Significant Labs: All pertinent labs within the past 24 hours have been reviewed.  Recent Lab Results         10/05/24  0547        Albumin 2.7                     Anion Gap 10              Baso # 0.06       Basophil % 1.5       BILIRUBIN TOTAL 0.6  Comment: For infants and newborns, interpretation of results should be based  on gestational age, weight and in agreement with clinical  observations.    Premature Infant recommended reference ranges:  Up to 24 hours.............<8.0 mg/dL  Up to 48 hours............<12.0 mg/dL  3-5 days..................<15.0 mg/dL  6-29  days.................<15.0 mg/dL         BUN 34       Calcium 9.1       Chloride 105       CO2 23       Creatinine 1.3       Differential Method Automated       eGFR 45       Eos # 0.0       Eos % 0.7       Glucose 115       Gran # (ANC) 2.3       Gran % 55.8       Hematocrit 35.5       Hemoglobin 11.3       Immature Grans (Abs) 0.02  Comment: Mild elevation in immature granulocytes is non specific and   can be seen in a variety of conditions including stress response,   acute inflammation, trauma and pregnancy. Correlation with other   laboratory and clinical findings is essential.         Immature Granulocytes 0.5       Lymph # 1.0       Lymph % 23.7       Magnesium  1.8       MCH 28.0       MCHC 31.8       MCV 88       Mono # 0.7       Mono % 17.8       MPV 10.0       nRBC 0       Platelet Count 319       Potassium 4.3       PROTEIN TOTAL 6.3       RBC 4.04       RDW 18.1       Sodium 138       WBC 4.09               Significant Imaging: I have reviewed all pertinent imaging results/findings within the past 24 hours.    US Abdomen Limited   Final Result      Limited exam.  Gallbladder is not identified.  Heterogeneous appearance of the liver with possible large mass involving the left lobe of the liver.  Second hypoechoic lesion in the right lobe measuring 1.1 cm.  Additionally, possible mass near the pancreatic head measuring 2.9 cm.      Further evaluation recommended with a CT of the abdomen and pelvis with contrast.         Electronically signed by: Karey Tabor MD   Date:    10/05/2024   Time:    12:06      X-Ray Chest AP Portable   Final Result      Abnormal chest radiograph as above.         Electronically signed by: Luis A Walker MD   Date:    10/02/2024   Time:    23:12      CTA Chest Non-Coronary (PE Studies)   Final Result      1.  No pulmonary emboli.   2.  New moderate-sized bilateral pleural effusions as described above with adjacent atelectasis or consolidation.   3.  No enlarged mediastinal  and hilar lymph nodes.   4.  Intralobular septal thickening and associated groundglass opacity bilaterally felt to represent edema.   5.  New irregular soft tissue mass in the left upper quadrant concerning for carcinomatosis.      All CT scans at [this location] are performed using dose modulation techniques as appropriate to a performed exam including the following: automated exposure control; adjustment of the mA and/or kV according to patient size (this includes techniques or standardized protocols for targeted exams where dose is matched to indication / reason for exam; i.e. extremities or head); use of iterative reconstruction technique.      Finalized on: 10/2/2024 10:52 PM By:  Gagan Abarca   BRRG# [id number]      2024-10-02 22:54:13.859    JEREMIAS

## 2024-10-05 NOTE — PLAN OF CARE
Problem: Adult Inpatient Plan of Care  Goal: Plan of Care Review  Outcome: Progressing  Goal: Patient-Specific Goal (Individualized)  Outcome: Progressing  Goal: Absence of Hospital-Acquired Illness or Injury  Outcome: Progressing  Goal: Optimal Comfort and Wellbeing  Outcome: Progressing  Goal: Readiness for Transition of Care  Outcome: Progressing     Problem: Pneumonia  Goal: Fluid Balance  Outcome: Progressing  Goal: Resolution of Infection Signs and Symptoms  Outcome: Progressing  Goal: Effective Oxygenation and Ventilation  Outcome: Progressing     Problem: Infection  Goal: Absence of Infection Signs and Symptoms  Outcome: Progressing   Discussed poc with pt, pt verbalized understanding    Purposeful rounding every 2hours    VS wnl  Cardiac monitoring in use, pt is NSR, tele monitor # [id number]  Fall precautions in place, remains injury free  Pt denies c/o pain and nausea at this time   Pain and nausea under control with PRN meds  Meds given as prescribed  Bed locked at lowest position  Call light within reach    Chart check complete  Will cont with POC

## 2024-10-05 NOTE — PROGRESS NOTES
[facility] Medicine  Progress Note    Patient Name: Casie Gaines  MRN: [id number]  Patient Class: IP- Inpatient   Admission Date: 10/2/2024  Length of Stay: 2 days  Attending Physician: Ermias Pereira MD  Primary Care Provider: Rossana Ang MD        Subjective:     Principal Problem:CAP (community acquired pneumonia)        HPI:  Casie Gaines is a 68 y.o. female with a PMH  has a past medical history of Anemia, Anxiety, Arthritis, Cancer, CHF (congestive heart failure), antineoplastic chemotherapy, Hyperlipemia, Hypertension, Neck pain, Ovarian cancer (2019), and Peritoneal carcinomatosis (01/26/2019).presented to the Emergency Department for evaluation of shortness of breathe which onset gradually the past week.  Patient was notes that she was becomes short of breath when she was ambulating.  She denies any chest pain but did note some mild twinges earlier this week.  She has a history of a DVT in his currently on anticoagulation for this.  She was an underlying history of ovarian cancer and notes that the DVT develop postoperatively.  He was denies any pleuritic symptoms.  She denies any hemoptysis.  He was no fevers or chills.  She currently notes that she has been off of her Lasix for several months due to at dry now her lips.. Symptoms are constant and moderate in severity. No mitigating or exacerbating factors reported. No associated sxs. Patient denies any swelling, fever, chills, sore throat and all other sxs at this time. No prior Tx. Pt reports being on Eliquis because of stomach blood clot. Pt is on chemo, but not on dialysis. No further complaints or concerns at this time.     ER workup revealed CBC to be unremarkable.  CMP revealed slightly worsening liver enzymes with AST/ALT of 176/93 in his ALP of 288.  BNP and troponin negative.  UA negative. CXR revealed:  Lungs demonstrate diffuse increased interstitial and parenchymal attenuation favored to reflect edema in the  appropriate clinical setting.  There are bilateral pleural effusions, left more so than right.    CTA of the chest revealed:  1.  No pulmonary emboli.  2.  New moderate-sized bilateral pleural effusions as described above with adjacent atelectasis or consolidation.  3.  No enlarged mediastinal and hilar lymph nodes.  4.  Intralobular septal thickening and associated groundglass opacity bilaterally felt to represent edema.  5.  New irregular soft tissue mass in the left upper quadrant concerning for carcinomatosis.    EKG revealed normal sinus rhythm with nonspecific T-wave abnormalities with a ventricular rate of 100 beats per minute and a QT/QTC of 342/441.  Patient was initially hypoxic with room air O2 saturation of 85%, but did improve to 98% on 3 liters/minute via nasal cannula.  All remaining vital signs stable.  Patient received 2 g cefepime in ER in addition to 80 mg Lasix IV.  Hospital Medicine consulted to admit patient for community-acquired pneumonia and hypoxia.  Patient in agreement with treatment plan.  Patient will be admitted under inpatient status.    PCP: Rossana Ang      Overview/Hospital Course:    10/4/24  NAEON, patient reports coughing, fatigue  On 3L NC, not on home O2  CT reviewed, will give one time dose Lasix 40 mg IV  Monitor I/O's  Continue IV antibiotics  Hold further IV fluids  Wean supplemental O2 as tolerated    10/5/24  NAEON, down to 2.5 L   Continue cefepime day # 4  Lasix 20 mg IV once  LFTs remain elevated, US ordered  Pt reports fatigue, coughing      Review of Systems   All other systems reviewed and are negative.    Objective:     Vital Signs (Most Recent):  Temp: 97.7 °F (36.5 °C) (10/05/24 0836)  Pulse: 70 (10/05/24 1130)  Resp: 18 (10/05/24 0836)  BP: 115/60 (10/05/24 0836)  SpO2: 97 % (10/05/24 1100) Vital Signs (24h Range):  Temp:  [97.5 °F (36.4 °C)-98.5 °F (36.9 °C)] 97.7 °F (36.5 °C)  Pulse:  [69-78] 70  Resp:  [17-18] 18  SpO2:  [91 %-97 %] 97 %  BP:  ()/(57-60) 115/60     Weight: 104.8 kg (231 lb 0.7 oz)  Body mass index is 36.19 kg/m².    Intake/Output Summary (Last 24 hours) at 10/5/2024 1412  Last data filed at 10/4/2024 1500  Gross per 24 hour   Intake --   Output 400 ml   Net -400 ml         Physical Exam  Vitals and nursing note reviewed.   Constitutional:       General: She is not in acute distress.     Appearance: Normal appearance. She is normal weight. She is ill-appearing.   Cardiovascular:      Rate and Rhythm: Regular rhythm. Tachycardia present.      Heart sounds: No murmur heard.  Pulmonary:      Effort: No respiratory distress.      Breath sounds: Rhonchi and rales present. No wheezing.      Comments: 3L NC  Neurological:      General: No focal deficit present.      Mental Status: She is alert and oriented to person, place, and time.   Psychiatric:         Mood and Affect: Mood normal.         Behavior: Behavior normal.             Significant Labs: All pertinent labs within the past 24 hours have been reviewed.  Recent Lab Results         10/05/24  0547        Albumin 2.7                     Anion Gap 10              Baso # 0.06       Basophil % 1.5       BILIRUBIN TOTAL 0.6  Comment: For infants and newborns, interpretation of results should be based  on gestational age, weight and in agreement with clinical  observations.    Premature Infant recommended reference ranges:  Up to 24 hours.............<8.0 mg/dL  Up to 48 hours............<12.0 mg/dL  3-5 days..................<15.0 mg/dL  6-29 days.................<15.0 mg/dL         BUN 34       Calcium 9.1       Chloride 105       CO2 23       Creatinine 1.3       Differential Method Automated       eGFR 45       Eos # 0.0       Eos % 0.7       Glucose 115       Gran # (ANC) 2.3       Gran % 55.8       Hematocrit 35.5       Hemoglobin 11.3       Immature Grans (Abs) 0.02  Comment: Mild elevation in immature granulocytes is non specific and   can be seen in a variety  of conditions including stress response,   acute inflammation, trauma and pregnancy. Correlation with other   laboratory and clinical findings is essential.         Immature Granulocytes 0.5       Lymph # 1.0       Lymph % 23.7       Magnesium  1.8       MCH 28.0       MCHC 31.8       MCV 88       Mono # 0.7       Mono % 17.8       MPV 10.0       nRBC 0       Platelet Count 319       Potassium 4.3       PROTEIN TOTAL 6.3       RBC 4.04       RDW 18.1       Sodium 138       WBC 4.09               Significant Imaging: I have reviewed all pertinent imaging results/findings within the past 24 hours.    US Abdomen Limited   Final Result      Limited exam.  Gallbladder is not identified.  Heterogeneous appearance of the liver with possible large mass involving the left lobe of the liver.  Second hypoechoic lesion in the right lobe measuring 1.1 cm.  Additionally, possible mass near the pancreatic head measuring 2.9 cm.      Further evaluation recommended with a CT of the abdomen and pelvis with contrast.         Electronically signed by: Karey Tabor MD   Date:    10/05/2024   Time:    12:06      X-Ray Chest AP Portable   Final Result      Abnormal chest radiograph as above.         Electronically signed by: Luis A Walker MD   Date:    10/02/2024   Time:    23:12      CTA Chest Non-Coronary (PE Studies)   Final Result      1.  No pulmonary emboli.   2.  New moderate-sized bilateral pleural effusions as described above with adjacent atelectasis or consolidation.   3.  No enlarged mediastinal and hilar lymph nodes.   4.  Intralobular septal thickening and associated groundglass opacity bilaterally felt to represent edema.   5.  New irregular soft tissue mass in the left upper quadrant concerning for carcinomatosis.      All CT scans at [this location] are performed using dose modulation techniques as appropriate to a performed exam including the following: automated exposure control; adjustment of the mA and/or kV  according to patient size (this includes techniques or standardized protocols for targeted exams where dose is matched to indication / reason for exam; i.e. extremities or head); use of iterative reconstruction technique.      Finalized on: 10/2/2024 10:52 PM By:  Gagan Abarca   R# [id number]      2024-10-02 22:54:[id number]    [facility]            Assessment/Plan:      * CAP (community acquired pneumonia)  Patient has a diagnosis of pneumonia. The cause of the pneumonia is unknown at this time. The pneumonia is worsening due to hypoxia . The patient has the following signs/symptoms of pneumonia: persistent hypoxia  and shortness of breath. The patient does have a current oxygen requirement and the patient does not have a home oxygen requirement. I have reviewed the pertinent imaging. The following cultures have been collected: Blood cultures The culture results are listed below.     Current antimicrobial regimen consists of the antibiotics listed below. Will monitor patient closely and continue current treatment plan unchanged.    Antibiotics (From admission, onward)      Start     Stop Route Frequency Ordered    10/03/24 0900  mupirocin 2 % ointment         10/08/24 0859 Nasl 2 times daily 10/03/24 0035    10/03/24 0130  ceFEPIme (MAXIPIME) 2 g in D5W 100 mL IVPB (MB+)         -- IV Every 12 hours (non-standard times) 10/03/24 0017            Microbiology Results (last 7 days)       Procedure Component Value Units Date/Time    Blood culture #2 **CANNOT BE ORDERED STAT** [[id number]] Collected: 10/03/24 0040    Order Status: Completed Specimen: Blood from Peripheral, Upper Arm, Right Updated: 10/04/24 1212     Blood Culture, Routine No Growth to date      No Growth to date    Blood culture #1 **CANNOT BE ORDERED STAT** [[id number]] Collected: 10/03/24 0040    Order Status: Completed Specimen: Blood from Peripheral, Hand, Left Updated: 10/04/24 1212     Blood Culture, Routine No Growth to date      No Growth to date     Respiratory Infection Panel (PCR), Nasopharyngeal [[id number]] Collected: 10/03/24 0612    Order Status: Completed Specimen: Nasopharyngeal Swab Updated: 10/03/24 0842     Respiratory Infection Panel Source NP swab     Adenovirus Not Detected     Coronavirus 229E, Common Cold Virus Not Detected     Coronavirus HKU1, Common Cold Virus Not Detected     Coronavirus NL63, Common Cold Virus Not Detected     Coronavirus OC43, Common Cold Virus Not Detected     Comment: The Coronavirus strains detected in this test cause the common cold.  These strains are not the COVID-19 (novel Coronavirus)strain   associated with the respiratory disease outbreak.          SARS-CoV2 (COVID-19) Qualitative PCR Not Detected     Human Metapneumovirus Not Detected     Human Rhinovirus/Enterovirus Not Detected     Influenza A (subtypes H1, H1-2009,H3) Not Detected     Influenza B Not Detected     Parainfluenza Virus 1 Not Detected     Parainfluenza Virus 2 Not Detected     Parainfluenza Virus 3 Not Detected     Parainfluenza Virus 4 Not Detected     Respiratory Syncytial Virus Not Detected     Bordetella Parapertussis (XB0789) Not Detected     Bordetella pertussis (ptxP) Not Detected     Chlamydia pneumoniae Not Detected     Mycoplasma pneumoniae Not Detected     Comment: Respiratory Infection Panel testing performed by Multiplex PCR.       Narrative:      Assay not valid for lower respiratory specimens, alternate  testing required.            Hypoxia  Likely secondary to CAP.  Initiated on cefepime in ER.  Supplemental oxygen applied.  Satting 98% on 2 liters/minute via nasal cannula.  No acute distress.    Plan:   -supplemental oxygen to keep sats above 92%   -DuoNebs p.r.n.   -monitor pulse ox  -Obtain Respiratory panel      Peritoneal carcinomatosis  See Ovarian Cancer Tx plan above    Transaminitis  Trending upward likely secondary to chemo treatment.   Plan:  -trend labs  -avoid hepatotoxic agents  -Hepatology consult if  warranted      Chemotherapy-induced peripheral neuropathy  Compliant with gabapentin.  Plan:  -Continue gabapentin      Ovarian cancer, bilateral  Followed by Dr. Pugh for Heme/Onc. Last evaluated by on 9/19/24. Last received Bevacizumab (9/20/24) and Doxorubicin/Bevacizumab in 2 weeks .  Plan:  -Heme/Onc consult      Hypertension  Chronic, controlled. Latest blood pressure and vitals reviewed-     Temp:  [97.3 °F (36.3 °C)-98.1 °F (36.7 °C)]   Pulse:  [70-84]   Resp:  [17-18]   BP: (102-118)/(55-64)   SpO2:  [91 %-94 %] .   Home meds for hypertension were reviewed and noted below.   Hypertension Medications               amLODIPine (NORVASC) 5 MG tablet Take 2 tablets (10 mg total) by mouth once daily.    atenoloL (TENORMIN) 25 MG tablet Take 1 tablet (25 mg total) by mouth once daily.    furosemide (LASIX) 40 MG tablet Take 1 tablet (40 mg total) by mouth once daily.    olmesartan-hydrochlorothiazide (BENICAR HCT) 40-25 mg per tablet Take 1 tablet by mouth once daily.            While in the hospital, will manage blood pressure as follows; Continue home antihypertensive regimen    Will utilize p.r.n. blood pressure medication only if patient's blood pressure greater than 160/100 and she develops symptoms such as worsening chest pain or shortness of breath.    Anxiety  Chronic. Stable. Not in acute exacerbation and currently denies endorsing any suicidal/homicidal ideations.   Plan:  -Continue home medications (xanax)        Hyperlipidemia  Patient is chronically on statin.will not continue for now. Last Lipid Panel:   Lab Results   Component Value Date    CHOL 162 06/06/2024    HDL 47 06/06/2024    LDLCALC 97.6 06/06/2024    TRIG 87 06/06/2024    CHOLHDL 29.0 06/06/2024     Plan:  -Hold home medication  -low fat/low calorie diet      VTE Risk Mitigation (From admission, onward)           Ordered     IP VTE HIGH RISK PATIENT  Once         10/03/24 0033     Place sequential compression device  Until discontinued          10/03/24 0033                    Discharge Planning   EVE:      Code Status: Full Code   Is the patient medically ready for discharge?:     Reason for patient still in hospital (select all that apply): Patient trending condition, Laboratory test, and Treatment  Discharge Plan A: Home with family                  Ermias Pereira MD  Department of Hospital Medicine   [facility]

## 2024-10-06 PROCEDURE — 11000001 HC ACUTE MED/SURG PRIVATE ROOM: Mod: HCNC

## 2024-10-06 PROCEDURE — 94761 N-INVAS EAR/PLS OXIMETRY MLT: CPT | Mod: HCNC

## 2024-10-06 PROCEDURE — 63600175 PHARM REV CODE 636 W HCPCS: Mod: HCNC | Performed by: NURSE PRACTITIONER

## 2024-10-06 PROCEDURE — 25000003 PHARM REV CODE 250: Mod: HCNC | Performed by: NURSE PRACTITIONER

## 2024-10-06 PROCEDURE — 25500020 PHARM REV CODE 255: Mod: HCNC | Performed by: HOSPITALIST

## 2024-10-06 PROCEDURE — 27000221 HC OXYGEN, UP TO 24 HOURS: Mod: HCNC

## 2024-10-06 PROCEDURE — A9698 NON-RAD CONTRAST MATERIALNOC: HCPCS | Mod: HCNC | Performed by: HOSPITALIST

## 2024-10-06 PROCEDURE — 25000003 PHARM REV CODE 250: Mod: HCNC | Performed by: EMERGENCY MEDICINE

## 2024-10-06 PROCEDURE — 99900035 HC TECH TIME PER 15 MIN (STAT): Mod: HCNC

## 2024-10-06 RX ADMIN — POTASSIUM CHLORIDE 20 MEQ: 1500 TABLET, EXTENDED RELEASE ORAL at 09:10

## 2024-10-06 RX ADMIN — IOHEXOL 100 ML: 350 INJECTION, SOLUTION INTRAVENOUS at 04:10

## 2024-10-06 RX ADMIN — IOHEXOL 1000 ML: 12 SOLUTION ORAL at 04:10

## 2024-10-06 RX ADMIN — CEFEPIME 2 G: 2 INJECTION, POWDER, FOR SOLUTION INTRAVENOUS at 01:10

## 2024-10-06 RX ADMIN — MUPIROCIN: 20 OINTMENT TOPICAL at 08:10

## 2024-10-06 RX ADMIN — POTASSIUM CHLORIDE 20 MEQ: 1500 TABLET, EXTENDED RELEASE ORAL at 08:10

## 2024-10-06 RX ADMIN — ONDANSETRON 4 MG: 2 INJECTION INTRAMUSCULAR; INTRAVENOUS at 09:10

## 2024-10-06 RX ADMIN — MUPIROCIN: 20 OINTMENT TOPICAL at 09:10

## 2024-10-06 NOTE — ASSESSMENT & PLAN NOTE
Chronic, controlled. Latest blood pressure and vitals reviewed-     Temp:  [97.5 °F (36.4 °C)-98.3 °F (36.8 °C)]   Pulse:  [68-88]   Resp:  [17-19]   BP: ()/(56-60)   SpO2:  [87 %-93 %] .   Home meds for hypertension were reviewed and noted below.   Hypertension Medications               amLODIPine (NORVASC) 5 MG tablet Take 2 tablets (10 mg total) by mouth once daily.    atenoloL (TENORMIN) 25 MG tablet Take 1 tablet (25 mg total) by mouth once daily.    furosemide (LASIX) 40 MG tablet Take 1 tablet (40 mg total) by mouth once daily.    olmesartan-hydrochlorothiazide (BENICAR HCT) 40-25 mg per tablet Take 1 tablet by mouth once daily.            While in the hospital, will manage blood pressure as follows; Continue home antihypertensive regimen    Will utilize p.r.n. blood pressure medication only if patient's blood pressure greater than 160/100 and she develops symptoms such as worsening chest pain or shortness of breath.

## 2024-10-06 NOTE — ASSESSMENT & PLAN NOTE
Followed by Dr. Pugh for Heme/Onc. Last evaluated by on 9/19/24. Last received Bevacizumab (9/20/24) and Doxorubicin/Bevacizumab in 2 weeks .  Plan:  -Heme/Onc consult    10/6/24  US with new liver lesion  CT A/P pending  Heme/Onc following

## 2024-10-06 NOTE — ASSESSMENT & PLAN NOTE
Trending upward likely secondary to chemo treatment.   Plan:  -trend labs  -avoid hepatotoxic agents  -Hepatology consult if warranted    10/6/24  US with new liver mass  CT A/P ordered  Heme/Onc following  Consult Hepatology

## 2024-10-06 NOTE — PROGRESS NOTES
[facility]  Progress Note    Patient Name: Casie Gaines  MRN: [id number]  Patient Class: IP- Inpatient   Admission Date: 10/2/2024  Length of Stay: 3 days  Attending Physician: Ermias Pereira MD  Primary Care Provider: Rossana Ang MD        Subjective:     Principal Problem:CAP (community acquired pneumonia)        HPI:  Casie Gaines is a 68 y.o. female with a PMH  has a past medical history of Anemia, Anxiety, Arthritis, Cancer, CHF (congestive heart failure), antineoplastic chemotherapy, Hyperlipemia, Hypertension, Neck pain, Ovarian cancer (2019), and Peritoneal carcinomatosis (01/26/2019).presented to the Emergency Department for evaluation of shortness of breathe which onset gradually the past week.  Patient was notes that she was becomes short of breath when she was ambulating.  She denies any chest pain but did note some mild twinges earlier this week.  She has a history of a DVT in his currently on anticoagulation for this.  She was an underlying history of ovarian cancer and notes that the DVT develop postoperatively.  He was denies any pleuritic symptoms.  She denies any hemoptysis.  He was no fevers or chills.  She currently notes that she has been off of her Lasix for several months due to at dry now her lips.. Symptoms are constant and moderate in severity. No mitigating or exacerbating factors reported. No associated sxs. Patient denies any swelling, fever, chills, sore throat and all other sxs at this time. No prior Tx. Pt reports being on Eliquis because of stomach blood clot. Pt is on chemo, but not on dialysis. No further complaints or concerns at this time.     ER workup revealed CBC to be unremarkable.  CMP revealed slightly worsening liver enzymes with AST/ALT of 176/93 in his ALP of 288.  BNP and troponin negative.  UA negative. CXR revealed:  Lungs demonstrate diffuse increased interstitial and parenchymal attenuation favored to reflect edema in the  appropriate clinical setting.  There are bilateral pleural effusions, left more so than right.    CTA of the chest revealed:  1.  No pulmonary emboli.  2.  New moderate-sized bilateral pleural effusions as described above with adjacent atelectasis or consolidation.  3.  No enlarged mediastinal and hilar lymph nodes.  4.  Intralobular septal thickening and associated groundglass opacity bilaterally felt to represent edema.  5.  New irregular soft tissue mass in the left upper quadrant concerning for carcinomatosis.    EKG revealed normal sinus rhythm with nonspecific T-wave abnormalities with a ventricular rate of 100 beats per minute and a QT/QTC of 342/441.  Patient was initially hypoxic with room air O2 saturation of 85%, but did improve to 98% on 3 liters/minute via nasal cannula.  All remaining vital signs stable.  Patient received 2 g cefepime in ER in addition to 80 mg Lasix IV.  Hospital Medicine consulted to admit patient for community-acquired pneumonia and hypoxia.  Patient in agreement with treatment plan.  Patient will be admitted under inpatient status.    PCP: Rossana Ang      Overview/Hospital Course:    10/4/24  NAEON, patient reports coughing, fatigue  On 3L NC, not on home O2  CT reviewed, will give one time dose Lasix 40 mg IV  Monitor I/O's  Continue IV antibiotics  Hold further IV fluids  Wean supplemental O2 as tolerated    10/5/24  NAEON, down to 2.5 L   Continue cefepime day # 4  Lasix 20 mg IV once  LFTs remain elevated, US ordered  Pt reports fatigue, coughing    10/6/24  NAEON, down to 1 L  US results with possible new liver mass  CT A/P ordered; Heme/Onc consult for tomorrow      Review of Systems   All other systems reviewed and are negative.    Objective:     Vital Signs (Most Recent):  Temp: 97.5 °F (36.4 °C) (10/06/24 1257)  Pulse: 86 (10/06/24 1257)  Resp: 19 (10/06/24 1257)  BP: 100/60 (10/06/24 1257)  SpO2: (!) 87 % (10/06/24 1257) Vital Signs (24h Range):  Temp:  [97.5 °F  (36.4 °C)-98.3 °F (36.8 °C)] 97.5 °F (36.4 °C)  Pulse:  [68-88] 86  Resp:  [17-19] 19  SpO2:  [87 %-93 %] 87 %  BP: ()/(56-60) 100/60     Weight: 104.8 kg (231 lb 0.7 oz)  Body mass index is 36.19 kg/m².  No intake or output data in the 24 hours ending 10/06/24 1701        Physical Exam  Vitals and nursing note reviewed.   Constitutional:       General: She is not in acute distress.     Appearance: Normal appearance. She is normal weight.   Cardiovascular:      Rate and Rhythm: Regular rhythm. Tachycardia present.      Heart sounds: No murmur heard.  Pulmonary:      Effort: No respiratory distress.      Breath sounds: Rhonchi and rales present. No wheezing.      Comments: 1 L NC  Neurological:      General: No focal deficit present.      Mental Status: She is alert and oriented to person, place, and time.   Psychiatric:         Mood and Affect: Mood normal.         Behavior: Behavior normal.             Significant Labs: All pertinent labs within the past 24 hours have been reviewed.  Recent Lab Results       None            Significant Imaging: I have reviewed all pertinent imaging results/findings within the past 24 hours.    US Abdomen Limited   Final Result      Limited exam.  Gallbladder is not identified.  Heterogeneous appearance of the liver with possible large mass involving the left lobe of the liver.  Second hypoechoic lesion in the right lobe measuring 1.1 cm.  Additionally, possible mass near the pancreatic head measuring 2.9 cm.      Further evaluation recommended with a CT of the abdomen and pelvis with contrast.         Electronically signed by: Karey Tabor MD   Date:    10/05/2024   Time:    12:06      X-Ray Chest AP Portable   Final Result      Abnormal chest radiograph as above.         Electronically signed by: Luis A Walker MD   Date:    10/02/2024   Time:    23:12      CTA Chest Non-Coronary (PE Studies)   Final Result      1.  No pulmonary emboli.   2.  New moderate-sized bilateral  pleural effusions as described above with adjacent atelectasis or consolidation.   3.  No enlarged mediastinal and hilar lymph nodes.   4.  Intralobular septal thickening and associated groundglass opacity bilaterally felt to represent edema.   5.  New irregular soft tissue mass in the left upper quadrant concerning for carcinomatosis.      All CT scans at [this location] are performed using dose modulation techniques as appropriate to a performed exam including the following: automated exposure control; adjustment of the mA and/or kV according to patient size (this includes techniques or standardized protocols for targeted exams where dose is matched to indication / reason for exam; i.e. extremities or head); use of iterative reconstruction technique.      Finalized on: 10/2/2024 10:52 PM By:  Gagan Abarca   [facility]# [id number]      2024-10-02 22:54:[id number]    [facility]      CT Abdomen Pelvis With IV Contrast Routine Oral Contrast    (Results Pending)         Assessment/Plan:      * CAP (community acquired pneumonia)  Patient has a diagnosis of pneumonia. The cause of the pneumonia is unknown at this time. The pneumonia is worsening due to hypoxia . The patient has the following signs/symptoms of pneumonia: persistent hypoxia  and shortness of breath. The patient does have a current oxygen requirement and the patient does not have a home oxygen requirement. I have reviewed the pertinent imaging. The following cultures have been collected: Blood cultures The culture results are listed below.     Current antimicrobial regimen consists of the antibiotics listed below. Will monitor patient closely and continue current treatment plan unchanged.    Antibiotics (From admission, onward)      Start     Stop Route Frequency Ordered    10/03/24 0900  mupirocin 2 % ointment         10/08/24 0859 Nasl 2 times daily 10/03/24 0035    10/03/24 0130  ceFEPIme (MAXIPIME) 2 g in D5W 100 mL IVPB (MB+)         -- IV Every 12 hours (non-standard  times) 10/03/24 0017            Microbiology Results (last 7 days)       Procedure Component Value Units Date/Time    Blood culture #2 **CANNOT BE ORDERED STAT** [[id number]] Collected: 10/03/24 0040    Order Status: Completed Specimen: Blood from Peripheral, Upper Arm, Right Updated: 10/04/24 1212     Blood Culture, Routine No Growth to date      No Growth to date    Blood culture #1 **CANNOT BE ORDERED STAT** [[id number]] Collected: 10/03/24 0040    Order Status: Completed Specimen: Blood from Peripheral, Hand, Left Updated: 10/04/24 1212     Blood Culture, Routine No Growth to date      No Growth to date    Respiratory Infection Panel (PCR), Nasopharyngeal [[id number]] Collected: 10/03/24 0612    Order Status: Completed Specimen: Nasopharyngeal Swab Updated: 10/03/24 0842     Respiratory Infection Panel Source NP swab     Adenovirus Not Detected     Coronavirus 229E, Common Cold Virus Not Detected     Coronavirus HKU1, Common Cold Virus Not Detected     Coronavirus NL63, Common Cold Virus Not Detected     Coronavirus OC43, Common Cold Virus Not Detected     Comment: The Coronavirus strains detected in this test cause the common cold.  These strains are not the COVID-19 (novel Coronavirus)strain   associated with the respiratory disease outbreak.          SARS-CoV2 (COVID-19) Qualitative PCR Not Detected     Human Metapneumovirus Not Detected     Human Rhinovirus/Enterovirus Not Detected     Influenza A (subtypes H1, H1-2009,H3) Not Detected     Influenza B Not Detected     Parainfluenza Virus 1 Not Detected     Parainfluenza Virus 2 Not Detected     Parainfluenza Virus 3 Not Detected     Parainfluenza Virus 4 Not Detected     Respiratory Syncytial Virus Not Detected     Bordetella Parapertussis (ZE2968) Not Detected     Bordetella pertussis (ptxP) Not Detected     Chlamydia pneumoniae Not Detected     Mycoplasma pneumoniae Not Detected     Comment: Respiratory Infection Panel testing performed by Multiplex PCR.        Narrative:      Assay not valid for lower respiratory specimens, alternate  testing required.            Hypoxia  Due to CAP, requried 3 L NC on arrival  Monitor O2 sats    10/6/2024  Weaned down to 1 L     Peritoneal carcinomatosis  See Ovarian Cancer Tx plan above    Transaminitis  Trending upward likely secondary to chemo treatment.   Plan:  -trend labs  -avoid hepatotoxic agents  -Hepatology consult if warranted    10/6/24  US with new liver mass  CT A/P ordered  Heme/Onc following  Consult Hepatology    Chemotherapy-induced peripheral neuropathy  Compliant with gabapentin.  Plan:  -Continue gabapentin    Ovarian cancer, bilateral  Followed by Dr. Pugh for Heme/Onc. Last evaluated by on 9/19/24. Last received Bevacizumab (9/20/24) and Doxorubicin/Bevacizumab in 2 weeks .  Plan:  -Heme/Onc consult    10/6/24  US with new liver lesion  CT A/P pending  Heme/Onc following    Hypertension  Chronic, controlled. Latest blood pressure and vitals reviewed-     Temp:  [97.5 °F (36.4 °C)-98.3 °F (36.8 °C)]   Pulse:  [68-88]   Resp:  [17-19]   BP: ()/(56-60)   SpO2:  [87 %-93 %] .   Home meds for hypertension were reviewed and noted below.   Hypertension Medications               amLODIPine (NORVASC) 5 MG tablet Take 2 tablets (10 mg total) by mouth once daily.    atenoloL (TENORMIN) 25 MG tablet Take 1 tablet (25 mg total) by mouth once daily.    furosemide (LASIX) 40 MG tablet Take 1 tablet (40 mg total) by mouth once daily.    olmesartan-hydrochlorothiazide (BENICAR HCT) 40-25 mg per tablet Take 1 tablet by mouth once daily.            While in the hospital, will manage blood pressure as follows; Continue home antihypertensive regimen    Will utilize p.r.n. blood pressure medication only if patient's blood pressure greater than 160/100 and she develops symptoms such as worsening chest pain or shortness of breath.    Anxiety  Chronic. Stable. Not in acute exacerbation and currently denies endorsing any  suicidal/homicidal ideations.   Plan:  -Continue home medications (xanax)    Hyperlipidemia  Patient is chronically on statin.will not continue for now. Last Lipid Panel:   Lab Results   Component Value Date    CHOL 162 06/06/2024    HDL 47 06/06/2024    LDLCALC 97.6 06/06/2024    TRIG 87 06/06/2024    CHOLHDL 29.0 06/06/2024     Plan:  -Hold home medication  -low fat/low calorie diet      VTE Risk Mitigation (From admission, onward)           Ordered     IP VTE HIGH RISK PATIENT  Once         10/03/24 0033     Place sequential compression device  Until discontinued         10/03/24 0033                    Discharge Planning   EVE:      Code Status: Full Code   Is the patient medically ready for discharge?:     Reason for patient still in hospital (select all that apply): Patient trending condition, Laboratory test, Treatment, Imaging, and Consult recommendations  Discharge Plan A: Home with family                  Ermias Pereira MD  Department of Hospital Medicine   [facility] Med S[facility]

## 2024-10-06 NOTE — SUBJECTIVE & OBJECTIVE
Review of Systems   All other systems reviewed and are negative.    Objective:     Vital Signs (Most Recent):  Temp: 97.5 °F (36.4 °C) (10/06/24 1257)  Pulse: 86 (10/06/24 1257)  Resp: 19 (10/06/24 1257)  BP: 100/60 (10/06/24 1257)  SpO2: (!) 87 % (10/06/24 1257) Vital Signs (24h Range):  Temp:  [97.5 °F (36.4 °C)-98.3 °F (36.8 °C)] 97.5 °F (36.4 °C)  Pulse:  [68-88] 86  Resp:  [17-19] 19  SpO2:  [87 %-93 %] 87 %  BP: ()/(56-60) 100/60     Weight: 104.8 kg (231 lb 0.7 oz)  Body mass index is 36.19 kg/m².  No intake or output data in the 24 hours ending 10/06/24 1701        Physical Exam  Vitals and nursing note reviewed.   Constitutional:       General: She is not in acute distress.     Appearance: Normal appearance. She is normal weight.   Cardiovascular:      Rate and Rhythm: Regular rhythm. Tachycardia present.      Heart sounds: No murmur heard.  Pulmonary:      Effort: No respiratory distress.      Breath sounds: Rhonchi and rales present. No wheezing.      Comments: 1 L NC  Neurological:      General: No focal deficit present.      Mental Status: She is alert and oriented to person, place, and time.   Psychiatric:         Mood and Affect: Mood normal.         Behavior: Behavior normal.             Significant Labs: All pertinent labs within the past 24 hours have been reviewed.  Recent Lab Results       None            Significant Imaging: I have reviewed all pertinent imaging results/findings within the past 24 hours.    US Abdomen Limited   Final Result      Limited exam.  Gallbladder is not identified.  Heterogeneous appearance of the liver with possible large mass involving the left lobe of the liver.  Second hypoechoic lesion in the right lobe measuring 1.1 cm.  Additionally, possible mass near the pancreatic head measuring 2.9 cm.      Further evaluation recommended with a CT of the abdomen and pelvis with contrast.         Electronically signed by: Karey Tabor MD   Date:    10/05/2024    Time:    12:06      X-Ray Chest AP Portable   Final Result      Abnormal chest radiograph as above.         Electronically signed by: Luis A Walker MD   Date:    10/02/2024   Time:    23:12      CTA Chest Non-Coronary (PE Studies)   Final Result      1.  No pulmonary emboli.   2.  New moderate-sized bilateral pleural effusions as described above with adjacent atelectasis or consolidation.   3.  No enlarged mediastinal and hilar lymph nodes.   4.  Intralobular septal thickening and associated groundglass opacity bilaterally felt to represent edema.   5.  New irregular soft tissue mass in the left upper quadrant concerning for carcinomatosis.      All CT scans at [this location] are performed using dose modulation techniques as appropriate to a performed exam including the following: automated exposure control; adjustment of the mA and/or kV according to patient size (this includes techniques or standardized protocols for targeted exams where dose is matched to indication / reason for exam; i.e. extremities or head); use of iterative reconstruction technique.      Finalized on: 10/2/2024 10:52 PM By:  Gagan Abarca   [facility]# [id number]      2024-10-02 22:54:13.859    [facility]      CT Abdomen Pelvis With IV Contrast Routine Oral Contrast    (Results Pending)

## 2024-10-06 NOTE — ASSESSMENT & PLAN NOTE
Chronic. Stable. Not in acute exacerbation and currently denies endorsing any suicidal/homicidal ideations.   Plan:  -Continue home medications (xanax)

## 2024-10-06 NOTE — PLAN OF CARE
Discussed poc with pt, pt verbalized understanding    Purposeful rounding     VS wnl    Cardiac monitoring in use tele monitor # [id number]    Fall precautions in place, remains injury free    Pt denies c/o anything    Abx given as prescribed    Bed locked at lowest position    Call light within reach    Chart check continued    Will cont with POC

## 2024-10-07 ENCOUNTER — NURSE TRIAGE (OUTPATIENT)
Dept: ADMINISTRATIVE | Facility: CLINIC | Age: 69
End: 2024-10-07
Payer: MEDICARE

## 2024-10-07 VITALS
RESPIRATION RATE: 20 BRPM | HEIGHT: 67 IN | OXYGEN SATURATION: 92 % | DIASTOLIC BLOOD PRESSURE: 70 MMHG | WEIGHT: 231.06 LBS | BODY MASS INDEX: 36.27 KG/M2 | TEMPERATURE: 98 F | HEART RATE: 76 BPM | SYSTOLIC BLOOD PRESSURE: 140 MMHG

## 2024-10-07 PROCEDURE — 94618 PULMONARY STRESS TESTING: CPT | Mod: HCNC

## 2024-10-07 PROCEDURE — 63600175 PHARM REV CODE 636 W HCPCS: Mod: HCNC | Performed by: NURSE PRACTITIONER

## 2024-10-07 PROCEDURE — 99900035 HC TECH TIME PER 15 MIN (STAT): Mod: HCNC

## 2024-10-07 PROCEDURE — 25000003 PHARM REV CODE 250: Mod: HCNC | Performed by: NURSE PRACTITIONER

## 2024-10-07 PROCEDURE — 27000221 HC OXYGEN, UP TO 24 HOURS: Mod: HCNC

## 2024-10-07 PROCEDURE — 94761 N-INVAS EAR/PLS OXIMETRY MLT: CPT | Mod: HCNC

## 2024-10-07 RX ORDER — CEFDINIR 300 MG/1
300 CAPSULE ORAL 2 TIMES DAILY
Qty: 4 CAPSULE | Refills: 0 | Status: SHIPPED | OUTPATIENT
Start: 2024-10-08 | End: 2024-10-10

## 2024-10-07 RX ADMIN — CEFEPIME 2 G: 2 INJECTION, POWDER, FOR SOLUTION INTRAVENOUS at 02:10

## 2024-10-07 RX ADMIN — ATENOLOL 25 MG: 25 TABLET ORAL at 08:10

## 2024-10-07 RX ADMIN — MUPIROCIN: 20 OINTMENT TOPICAL at 08:10

## 2024-10-07 RX ADMIN — HYDROCHLOROTHIAZIDE 25 MG: 25 TABLET ORAL at 08:10

## 2024-10-07 RX ADMIN — AMLODIPINE BESYLATE 10 MG: 10 TABLET ORAL at 08:10

## 2024-10-07 RX ADMIN — CEFEPIME 2 G: 2 INJECTION, POWDER, FOR SOLUTION INTRAVENOUS at 12:10

## 2024-10-07 RX ADMIN — POTASSIUM CHLORIDE 20 MEQ: 1500 TABLET, EXTENDED RELEASE ORAL at 08:10

## 2024-10-07 NOTE — PLAN OF CARE
Problem: Adult Inpatient Plan of Care  Goal: Plan of Care Review  10/7/2024 1557 by Lorraine Dueñas RN  Outcome: Met  10/7/2024 1348 by Lorraine Dueñas RN  Outcome: Progressing  Goal: Patient-Specific Goal (Individualized)  10/7/2024 1557 by Lorraine Dueñas RN  Outcome: Met  10/7/2024 1348 by Lorraine Dueñas RN  Outcome: Progressing  Goal: Absence of Hospital-Acquired Illness or Injury  10/7/2024 1557 by Lorraine Dueñas RN  Outcome: Met  10/7/2024 1348 by Lorraine Dueñas RN  Outcome: Progressing  Goal: Optimal Comfort and Wellbeing  10/7/2024 1557 by Lorraine Dueñas RN  Outcome: Met  10/7/2024 1348 by Lorraine Dueñas RN  Outcome: Progressing  Goal: Readiness for Transition of Care  10/7/2024 1557 by Lorraine Dueñas RN  Outcome: Met  10/7/2024 1348 by Lorraine Dueñas RN  Outcome: Progressing     Problem: Pneumonia  Goal: Fluid Balance  10/7/2024 1557 by Lorraine Dueñas RN  Outcome: Met  10/7/2024 1348 by Lorraine Dueñas RN  Outcome: Progressing  Goal: Resolution of Infection Signs and Symptoms  10/7/2024 1557 by Lorraine Dueñas RN  Outcome: Met  10/7/2024 1348 by Lorraine Dueñas RN  Outcome: Progressing  Goal: Effective Oxygenation and Ventilation  10/7/2024 1557 by Lorraine Dueñas RN  Outcome: Met  10/7/2024 1348 by Lorraine Dueñas RN  Outcome: Progressing     Problem: Infection  Goal: Absence of Infection Signs and Symptoms  10/7/2024 1557 by Lorraine Dueñas RN  Outcome: Met  10/7/2024 1348 by Lorraine Dueñas RN  Outcome: Progressing

## 2024-10-07 NOTE — DISCHARGE SUMMARY
[facility] Medicine  Discharge Summary      Patient Name: Casie Gaines  MRN: [id number]  TANISHA: [id number]  Patient Class: IP- Inpatient  Admission Date: 10/2/2024  Hospital Length of Stay: 4 days  Discharge Date and Time:  10/07/2024 3:48 PM  Attending Physician: Herb Ramsey MD   Discharging Provider: Herb Ramsey MD  Primary Care Provider: Rossana Ang MD    Primary Care Team: Networked reference to record PCT     HPI:   Casie Gaines is a 68 y.o. female with a PMH  has a past medical history of Anemia, Anxiety, Arthritis, Cancer, CHF (congestive heart failure), antineoplastic chemotherapy, Hyperlipemia, Hypertension, Neck pain, Ovarian cancer (2019), and Peritoneal carcinomatosis (01/26/2019).presented to the Emergency Department for evaluation of shortness of breathe which onset gradually the past week.  Patient was notes that she was becomes short of breath when she was ambulating.  She denies any chest pain but did note some mild twinges earlier this week.  She has a history of a DVT in his currently on anticoagulation for this.  She was an underlying history of ovarian cancer and notes that the DVT develop postoperatively.  He was denies any pleuritic symptoms.  She denies any hemoptysis.  He was no fevers or chills.  She currently notes that she has been off of her Lasix for several months due to at dry now her lips.. Symptoms are constant and moderate in severity. No mitigating or exacerbating factors reported. No associated sxs. Patient denies any swelling, fever, chills, sore throat and all other sxs at this time. No prior Tx. Pt reports being on Eliquis because of stomach blood clot. Pt is on chemo, but not on dialysis. No further complaints or concerns at this time.     ER workup revealed CBC to be unremarkable.  CMP revealed slightly worsening liver enzymes with AST/ALT of 176/93 in his ALP of 288.  BNP and troponin negative.  UA negative. CXR revealed:  Lungs demonstrate  diffuse increased interstitial and parenchymal attenuation favored to reflect edema in the appropriate clinical setting.  There are bilateral pleural effusions, left more so than right.    CTA of the chest revealed:  1.  No pulmonary emboli.  2.  New moderate-sized bilateral pleural effusions as described above with adjacent atelectasis or consolidation.  3.  No enlarged mediastinal and hilar lymph nodes.  4.  Intralobular septal thickening and associated groundglass opacity bilaterally felt to represent edema.  5.  New irregular soft tissue mass in the left upper quadrant concerning for carcinomatosis.    EKG revealed normal sinus rhythm with nonspecific T-wave abnormalities with a ventricular rate of 100 beats per minute and a QT/QTC of 342/441.  Patient was initially hypoxic with room air O2 saturation of 85%, but did improve to 98% on 3 liters/minute via nasal cannula.  All remaining vital signs stable.  Patient received 2 g cefepime in ER in addition to 80 mg Lasix IV.  Hospital Medicine consulted to admit patient for community-acquired pneumonia and hypoxia.  Patient in agreement with treatment plan.  Patient will be admitted under inpatient status.    PCP: Rossana Ang      * No surgery found *      Hospital Course:   Patient was admitted for shortness breath secondary to pneumonia.  She was started on empiric cefepime.  Symptoms improved.  Home O2 eval performed and patient qualified.  Cefepime was transitioned to p.o. cefdinir.  Of note imaging was concerning for worsening metastatic disease.  Patient currently being seen by Hematology outpatient.  Has received 1/3 treatment of current chemotherapy regimen.  Recommended patient follow-up with Hematology to complete treatment course and to get reimaging afterwards.  Patient voiced understanding.  She was discharged home with home health.        Goals of Care Treatment Preferences:  Code Status: Full Code      SDOH Screening:  The patient was screened  for utility difficulties, food insecurity, transport difficulties, housing insecurity, and interpersonal safety and there were no concerns identified this admission.     Consults:   Consults (From admission, onward)          Status Ordering Provider     Inpatient consult to Hematology/Oncology  Once        Provider:  Liz Salinas MD    Completed ELE DAVENPORT            No new Assessment & Plan notes have been filed under this hospital service since the last note was generated.  Service: Hospital Medicine    Final Active Diagnoses:    Diagnosis Date Noted POA    PRINCIPAL PROBLEM:  CAP (community acquired pneumonia) [J18.9] 10/03/2024 Yes    Hypoxia [R09.02] 10/03/2024 Yes    Transaminitis [R74.01] 10/03/2024 Yes    Chemotherapy-induced peripheral neuropathy [G62.0, T45.1X5A] 04/17/2024 Yes    Anxiety [F41.9] 11/11/2019 Yes    Ovarian cancer, bilateral [C56.3] 08/15/2019 Yes    Peritoneal carcinomatosis [C78.6] 01/26/2019 Yes    Hyperlipidemia [E78.5] 02/01/2018 Yes    Hypertension [I10] 06/12/2013 Yes      Problems Resolved During this Admission:       Discharged Condition: stable    Disposition: Home or Self Care    Follow Up:   Contact information for follow-up providers       Torri House MD Follow up in 1 week(s).    Specialties: Internal Medicine, Hematology and Oncology  Contact information:  [address]  [place] [address]  [phone]                       Contact information for after-discharge care       Durable Medical Equipment       [facility] .    Service: Durable Medical Equipment  Contact information:  [address] [place] [address]  [phone]                                 Patient Instructions:      OXYGEN FOR HOME USE     Order Specific Question Answer Comments   Liter Flow 2    Duration Continuous    Qualifying Test Performed at: Rest    Oxygen saturation: 86    Portable mode: continuous    Route nasal cannula    Device: home  "concentrator with portable tanks    Length of need (in months): 3 mos    Patient condition with qualifying saturation Other - List qualifying diagnosis and code pneumonia   Select a diagnosis & list the code in the comments Pneumonia [556308]    Height: 5' 7" (1.702 m)    Weight: 104.8 kg (231 lb 0.7 oz)    Alternative treatment measures have been tried or considered and deemed clinically ineffective. Yes        Significant Diagnostic Studies: N/A    Pending Diagnostic Studies:       None           Medications:  Reconciled Home Medications:      Medication List        START taking these medications      cefdinir 300 MG capsule  Commonly known as: OMNICEF  Take 1 capsule (300 mg total) by mouth 2 (two) times daily. for 2 days  Start taking on: October 8, 2024            CHANGE how you take these medications      cetirizine 10 MG tablet  Commonly known as: ZYRTEC  Take 1 tablet (10 mg total) by mouth once daily.  What changed:   when to take this  reasons to take this            CONTINUE taking these medications      albuterol 90 mcg/actuation inhaler  Commonly known as: PROVENTIL/VENTOLIN HFA  Inhale 2 puffs into the lungs every 4 (four) hours as needed for Wheezing. Rescue     ALPRAZolam 0.25 MG tablet  Commonly known as: XANAX  Take 1 tablet (0.25 mg total) by mouth 3 (three) times daily as needed for Anxiety.     amLODIPine 5 MG tablet  Commonly known as: NORVASC  Take 2 tablets (10 mg total) by mouth once daily.     atenoloL 25 MG tablet  Commonly known as: TENORMIN  Take 25 mg by mouth once daily.     biotin 1 mg tablet  Take 1,000 mcg by mouth once daily.     diclofenac sodium 1 % Gel  Commonly known as: VOLTAREN  Apply once a day to back as needed     EPINEPHrine 0.3 mg/0.3 mL Atin  Commonly known as: EPIPEN  Inject 0.3 mLs (0.3 mg total) into the muscle once. for 1 dose     fluticasone propionate 50 mcg/actuation nasal spray  Commonly known as: FLONASE  1 spray (50 mcg total) by Each Nostril route every 12 " (twelve) hours as needed for Rhinitis.     gabapentin 300 MG capsule  Commonly known as: NEURONTIN  Take 2 capsules (600 mg total) by mouth 3 (three) times daily.     ginkgo biloba 40 mg Tab  Take 40 mg by mouth once daily.     multivitamin with minerals tablet  Take 1 tablet by mouth once daily.     olmesartan-hydrochlorothiazide 40-25 mg per tablet  Commonly known as: BENICAR HCT  Take 1 tablet by mouth once daily.     prochlorperazine 5 MG tablet  Commonly known as: COMPAZINE  Take 1 tablet (5 mg total) by mouth every 6 (six) hours as needed for Nausea.     rosuvastatin 10 MG tablet  Commonly known as: CRESTOR  Take 1 tablet (10 mg total) by mouth once daily.     sertraline 25 MG tablet  Commonly known as: ZOLOFT  Take 1 tablet (25 mg total) by mouth once daily.     traMADoL 50 mg tablet  Commonly known as: ULTRAM  Take 1 tablet (50 mg total) by mouth every 12 (twelve) hours as needed for Pain.              Indwelling Lines/Drains at time of discharge:   Lines/Drains/Airways       Central Venous Catheter Line  Duration             Port A Cath Single Lumen left subclavian -- days                    Time spent on the discharge of patient: 37 minutes         Herb Ramsey MD  Department of Hospital Medicine  [facility] - Med Surg

## 2024-10-07 NOTE — TELEPHONE ENCOUNTER
Attempted to call x 2 no answer.  Left voicemail to call back if further assistance is needed.  Message routed to PCP for follow up.  Reason for Disposition   Second attempt to contact caller AND no contact made. Phone number verified.    Protocols used: No Contact or Duplicate Contact Call-A-AH

## 2024-10-07 NOTE — RESPIRATORY THERAPY
Home Oxygen Evaluation - [facility] - Cardiopulmonary Department      Date Performed: 10/7/2024      1) Patient's Home O2 Sat on room air, while at rest: Room Air SpO2 At Rest: (!) 86 %        If O2 sats on room air at rest are 88% or below, patient qualifies.  Document O2 liter flow needed in Step 2.  If O2 sats are 89% or above, complete Step 3.        2)  If patient is not ambulated and O2 sats are <88%, what is the O2 liter flow required to meet ordered saturation?      If O2 sats on room air while exercising remain 89% or above patient does not qualify, no further testing needed Document N/A in step 3. If O2 sats on room air while exercising are 88% or below, continue to Step 4.    3) Patient's O2 Sat on room air while exercisin) Patient's O2 Sat while exercising on O2: SpO2 During Ambulation on O2: 92 % at Ambulation O2 LPM: 2 LPM         (Must show improvement from #4 for patients to qualify)

## 2024-10-07 NOTE — CONSULTS
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  - Right ureteral obstruction with indwelling ureteral stent     Previous Treatment:    - 2019 - 2019: Carboplatin, paclitaxel and Avastin x 6 cycles  - 08/15/2019:  salpingo-oophorectomy, ureterolysis/Omentectomy with complete pathologic response  - 10/2019 - 2020 Avastin maintenance   - Carboplatin and Paclitaxel (2023 - 2024)     Current Treatment:  Doxorubicin and Bevacizumab (24 - )      PAST MEDICAL HISTORY:   Past Medical History:   Diagnosis Date    Anemia     Anxiety     Arthritis     knees    Cancer     ovarian    CHF (congestive heart failure)     Hx antineoplastic chemotherapy     last 2019    Hyperlipemia     Hypertension     Neck pain     Ovarian cancer 2019    CHEMO    Peritoneal carcinomatosis 2019       PAST SURGICAL HISTORY:   Past Surgical History:   Procedure Laterality Date    breast reduction  10/02/2018    CATARACT EXTRACTION Bilateral     2023     SECTION      X 1    COLONOSCOPY N/A 2021    Procedure: COLONOSCOPY;  Surgeon: Paulette Rojas MD;  Location: [facility];  Service: Gastroenterology;  Laterality: N/A;    CYSTOSCOPY W/ URETERAL STENT PLACEMENT Right 2019    Procedure: CYSTOSCOPY, WITH URETERAL STENT INSERTION;  Surgeon: Timmy Santiago IV, MD;  Location: [facility] OR;  Service: Urology;  Laterality: Right;    CYSTOSCOPY W/ URETERAL STENT REMOVAL  10/04/2019    DILATION AND CURETTAGE OF UTERUS      HYSTERECTOMY      RALH for fibroids (still has ovaries)    INSERTION OF VENOUS ACCESS PORT Left 2019    Procedure: INSERTION, VENOUS ACCESS PORT;  Surgeon: Ulisses Monzon MD;  Location: [facility] OR;  Service: General;  Laterality:  Left;  Left internal jugular     LYSIS OF ADHESIONS OF URETER N/A 08/15/2019    Procedure: URETEROLYSIS;  Surgeon: Ismael Juarez MD;  Location: [facility] OR;  Service: OB/GYN;  Laterality: N/A;    OMENTECTOMY N/A 08/15/2019    Procedure: OMENTECTOMY;  Surgeon: Ismael Juarez MD;  Location: [facility] OR;  Service: OB/GYN;  Laterality: N/A;    OOPHORECTOMY      RETROGRADE PYELOGRAPHY Right 01/30/2019    Procedure: PYELOGRAM, RETROGRADE;  Surgeon: Timmy Santiago IV, MD;  Location: [facility] OR;  Service: Urology;  Laterality: Right;    ROBOT-ASSISTED LAPAROSCOPIC SALPINGO-OOPHORECTOMY USING DA TIA XI Bilateral 08/15/2019    Procedure: XI ROBOTIC SALPINGO-OOPHORECTOMY;  Surgeon: Ismael Juarez MD;  Location: [facility] OR;  Service: OB/GYN;  Laterality: Bilateral;    TOTAL REDUCTION MAMMOPLASTY  2018    TUBAL LIGATION         PAST SOCIAL HISTORY:   reports that she quit smoking about 6 years ago. Her smoking use included cigarettes. She started smoking about 31 years ago. She has a 25 pack-year smoking history. She has never used smokeless tobacco. She reports current alcohol use. She reports that she does not use drugs.    FAMILY HISTORY:  Family History   Problem Relation Name Age of Onset    Glaucoma Mother      No Known Problems Father      Colon cancer Brother      Breast cancer Maternal Aunt      Breast cancer Paternal Aunt      Ovarian cancer Paternal Aunt      Anesthesia problems Other cousin     Thrombophilia Neg Hx         CURRENT MEDICATIONS:   Current Facility-Administered Medications   Medication    albuterol-ipratropium 2.5 mg-0.5 mg/3 mL nebulizer solution 3 mL    ALPRAZolam tablet 0.25 mg    aluminum-magnesium hydroxide-simethicone 200-200-20 mg/5 mL suspension 30 mL    amLODIPine tablet 10 mg    atenoloL tablet 25 mg    ceFEPIme (MAXIPIME) 2 g in D5W 100 mL IVPB (MB+)    dextrose 10% bolus 125 mL 125 mL    dextrose 10% bolus 250 mL 250 mL    gabapentin capsule 600 mg    glucagon (human recombinant) injection 1 mg     glucose chewable tablet 16 g    glucose chewable tablet 24 g    hydroCHLOROthiazide tablet 25 mg    HYDROcodone-acetaminophen 5-325 mg per tablet 1 tablet    melatonin tablet 6 mg    morphine injection 2 mg    mupirocin 2 % ointment    naloxone 0.4 mg/mL injection 0.02 mg    ondansetron injection 4 mg    polyethylene glycol packet 17 g    potassium chloride SA CR tablet 20 mEq    promethazine tablet 25 mg    simethicone chewable tablet 80 mg    sodium chloride 0.9% flush 3 mL     ALLERGIES:   Review of patient's allergies indicates:  No Known Allergies      Review of Systems:     Review of Systems - Oncology       Physical Exam:     Vitals:    10/07/24 1226   BP: (!) 140/70   Pulse: 75   Resp: 20   Temp: 97.7 °F (36.5 °C)     Physical Exam    ECOG Performance Status: (foot note - ECOG PS provided by [employer]cology [employer]) {performance status:26883}    Karnofsky Performance Score:  {KARNOFSKY SCORE:48859}    Labs:   Recent Labs   Lab 10/03/24  0612 10/04/24  0518 10/05/24  0547   WBC 3.66* 3.93 4.09   HGB 11.8* 11.3* 11.3*   HCT 38.0 36.6* 35.5*    324 319       Recent Labs   Lab 10/02/24  1152 10/02/24  2246 10/03/24  0612 10/04/24  0518 10/05/24  0547      < > 140 139 138   K 4.1   < > 3.8 4.0 4.3      < > 105 105 105   CO2 24   < > 24 24 23   BUN 26*   < > 27* 30* 34*   CREATININE 1.2   < > 1.2 1.1 1.3   CALCIUM 9.5   < > 9.0 8.7 9.1   PROT 7.1   < > 6.6 6.2 6.3   BILITOT 0.7   < > 0.7 0.7 0.6   ALKPHOS 305*   < > 311* 318* 328*   *   < > 101* 113* 127*   *   < > 190* 225* 238*   MG 1.9  --   --   --  1.8   PHOS 3.8  --   --   --   --     < > = values in this interval not displayed.       Imaging: {clinical data review:36715}      Assessment and Plan:     [unfilled]    *** minutes were spent face to face with the patient and her  family to discuss the disease, natural history, treatment options and survival statistics. I have provided the patient with an opportunity  to ask questions and have all questions answered to his satisfaction.       she will return to clinic in 6 months, but knows to call in the interim if symptoms change or should a problem arise.        Michelle Green MD  Hematology and Medical Oncology  Bone Marrow Transplant  [facility]

## 2024-10-07 NOTE — PLAN OF CARE
[facility] - Med Surg  Discharge Final Note    Primary Care Provider: Rossana Ang MD    Expected Discharge Date: 10/7/2024    Final Discharge Note (most recent)       Final Note - 10/07/24 1525          Final Note    Assessment Type Final Discharge Note     Anticipated Discharge Disposition Home or Self Care     Hospital Resources/Appts/Education Provided Appointments scheduled and added to AVS;Post-Acute resouces added to AVS        Post-Acute Status    Post-Acute Authorization HME     HME Status Pending payor review     Discharge Delays None known at this time                      Follow-up providers       Torri House MD   Specialty: Internal Medicine, Hematology and Oncology   Relationship: Consulting Physician    [address]  [place] [address]   Phone: [phone]       Next Steps: Follow up in 1 week(s)              After-discharge care                Durable Medical Equipment       [facility]   Service: Durable Medical Equipment    [address] [place] [address]   Phone: [phone]                             O2 referral sent to [facility]; pending approval at this time.     Once O2 is approved HME will be delivered to bedside.     1542 HH ordered after FN completed.     Leona spoke with pt this afternoon to discuss HH needs. Pt stated she will speak with her dtr to get the name of the company to figure out which HH company she wants to go with. Sw to call pt back shortly for HH name to send referral.     1543 Leona spoke with pt regarding HH name. Pt stated her dtr wants a referral sent to [facility]. Leona explained that this is a private sitter service and this is not covered by insurance. Leona explained the difference between sitter services and HH. Leona explained that private sitters must be arranged by family and that HH can be set up by CM in the hospital. Pt agreeable for Sw to send a referral to [facility].

## 2024-10-08 ENCOUNTER — DOCUMENTATION ONLY (OUTPATIENT)
Dept: HEMATOLOGY/ONCOLOGY | Facility: CLINIC | Age: 69
End: 2024-10-08
Payer: MEDICARE

## 2024-10-08 ENCOUNTER — TELEPHONE (OUTPATIENT)
Dept: HEMATOLOGY/ONCOLOGY | Facility: CLINIC | Age: 69
End: 2024-10-08
Payer: MEDICARE

## 2024-10-08 ENCOUNTER — TELEPHONE (OUTPATIENT)
Dept: INFUSION THERAPY | Facility: HOSPITAL | Age: 69
End: 2024-10-08
Payer: MEDICARE

## 2024-10-08 DIAGNOSIS — C78.6 PERITONEAL CARCINOMATOSIS: ICD-10-CM

## 2024-10-08 DIAGNOSIS — C56.3 OVARIAN CANCER, BILATERAL: Primary | ICD-10-CM

## 2024-10-08 LAB
BACTERIA BLD CULT: NORMAL
BACTERIA BLD CULT: NORMAL

## 2024-10-08 NOTE — TELEPHONE ENCOUNTER
I contacted patient to tell her I have her stat pet scan booked for Friday 10/11.  Patient said she is supposed to get treatment not a pet scan.

## 2024-10-08 NOTE — PROGRESS NOTES
Called pt to reschedule all of C2 D1 appts after hospitalization. Offered pt VV with Ms Pal on 11/10 at 0730 and infusion at  at 1130. Pt verbalized understanding. Pt also advised of stat PET ordered by Dr House that is scheduled for fri at . Pt verbalized understanding. Pt will call ONN if any questions or concerns arise.   Oncology Navigation   Intake  Date of Diagnosis: 24 (repeat LN bx)  Cancer Type: Gynecologic  Type of Referral: Internal  Initial Nurse Navigator Contact: 24  Start of Treatment: 24  Diagnosis to Treat Timeline (days): 15 days     Treatment  Current Status: Active       Medical Oncologist: Harsh  Chemotherapy: Planned  Chemotherapy Regimen: Doxil  Delays/Reductions Due to:: Other (insufficient tissue for genetic testing, repeat bx needed)  Immunotherapy: Planned  Immunotherapy Name: Ailyn  Start Date: 24                       Acuity  Stage: 2  Systemic Treatment - predicted or initiated: Chemotherapy Regimen with Multiple drugs (+1)  Treatment Tolerability: 1  ECO  Comorbidities in Medical History: 1  Hospitalization Within the Past Month: 0   Needed: 0  Support: 0  Verbalizes Financial Concerns: 0  Transportation: 0  History of noncompliance/frequent no shows and cancellations: 0  Verbalizes the need for more education: 0  Navigation Acuity: 5     Follow Up  No follow-ups on file.

## 2024-10-09 ENCOUNTER — LAB VISIT (OUTPATIENT)
Dept: LAB | Facility: HOSPITAL | Age: 69
End: 2024-10-09
Attending: INTERNAL MEDICINE
Payer: MEDICARE

## 2024-10-09 ENCOUNTER — DOCUMENTATION ONLY (OUTPATIENT)
Dept: HEMATOLOGY/ONCOLOGY | Facility: CLINIC | Age: 69
End: 2024-10-09
Payer: MEDICARE

## 2024-10-09 ENCOUNTER — PATIENT OUTREACH (OUTPATIENT)
Dept: ADMINISTRATIVE | Facility: CLINIC | Age: 69
End: 2024-10-09
Payer: MEDICARE

## 2024-10-09 DIAGNOSIS — C56.3 OVARIAN CANCER, BILATERAL: ICD-10-CM

## 2024-10-09 DIAGNOSIS — R59.1 LYMPHADENOPATHY: ICD-10-CM

## 2024-10-09 DIAGNOSIS — C56.3 MALIGNANT NEOPLASM OF BOTH OVARIES: ICD-10-CM

## 2024-10-09 LAB
ALBUMIN SERPL BCP-MCNC: 2.9 G/DL (ref 3.5–5.2)
ALP SERPL-CCNC: 451 U/L (ref 55–135)
ALT SERPL W/O P-5'-P-CCNC: 167 U/L (ref 10–44)
ANION GAP SERPL CALC-SCNC: 12 MMOL/L (ref 8–16)
AST SERPL-CCNC: 313 U/L (ref 10–40)
BACTERIA #/AREA URNS HPF: ABNORMAL /HPF
BASOPHILS # BLD AUTO: 0.11 K/UL (ref 0–0.2)
BASOPHILS NFR BLD: 2 % (ref 0–1.9)
BILIRUB SERPL-MCNC: 0.8 MG/DL (ref 0.1–1)
BILIRUB UR QL STRIP: NEGATIVE
BUN SERPL-MCNC: 31 MG/DL (ref 8–23)
CALCIUM SERPL-MCNC: 9.8 MG/DL (ref 8.7–10.5)
CHLORIDE SERPL-SCNC: 108 MMOL/L (ref 95–110)
CLARITY UR: CLEAR
CO2 SERPL-SCNC: 23 MMOL/L (ref 23–29)
COLOR UR: YELLOW
CREAT SERPL-MCNC: 1.2 MG/DL (ref 0.5–1.4)
DIFFERENTIAL METHOD BLD: ABNORMAL
EOSINOPHIL # BLD AUTO: 0 K/UL (ref 0–0.5)
EOSINOPHIL NFR BLD: 0.4 % (ref 0–8)
ERYTHROCYTE [DISTWIDTH] IN BLOOD BY AUTOMATED COUNT: 18.4 % (ref 11.5–14.5)
EST. GFR  (NO RACE VARIABLE): 49 ML/MIN/1.73 M^2
GLUCOSE SERPL-MCNC: 88 MG/DL (ref 70–110)
GLUCOSE UR QL STRIP: NEGATIVE
GRAN CASTS #/AREA URNS LPF: 3 /LPF
HCT VFR BLD AUTO: 41.4 % (ref 37–48.5)
HGB BLD-MCNC: 12.7 G/DL (ref 12–16)
HGB UR QL STRIP: NEGATIVE
HYALINE CASTS #/AREA URNS LPF: 7 /LPF
IMM GRANULOCYTES # BLD AUTO: 0.02 K/UL (ref 0–0.04)
IMM GRANULOCYTES NFR BLD AUTO: 0.4 % (ref 0–0.5)
KETONES UR QL STRIP: ABNORMAL
LDH SERPL L TO P-CCNC: 2634 U/L (ref 110–260)
LEUKOCYTE ESTERASE UR QL STRIP: NEGATIVE
LYMPHOCYTES # BLD AUTO: 1.1 K/UL (ref 1–4.8)
LYMPHOCYTES NFR BLD: 19.6 % (ref 18–48)
MAGNESIUM SERPL-MCNC: 2.1 MG/DL (ref 1.6–2.6)
MCH RBC QN AUTO: 27.4 PG (ref 27–31)
MCHC RBC AUTO-ENTMCNC: 30.7 G/DL (ref 32–36)
MCV RBC AUTO: 89 FL (ref 82–98)
MICROSCOPIC COMMENT: ABNORMAL
MONOCYTES # BLD AUTO: 0.8 K/UL (ref 0.3–1)
MONOCYTES NFR BLD: 15.4 % (ref 4–15)
NEUTROPHILS # BLD AUTO: 3.4 K/UL (ref 1.8–7.7)
NEUTROPHILS NFR BLD: 62.2 % (ref 38–73)
NITRITE UR QL STRIP: NEGATIVE
NRBC BLD-RTO: 0 /100 WBC
PH UR STRIP: 6 [PH] (ref 5–8)
PHOSPHATE SERPL-MCNC: 3.3 MG/DL (ref 2.7–4.5)
PLATELET # BLD AUTO: 330 K/UL (ref 150–450)
PMV BLD AUTO: 10.1 FL (ref 9.2–12.9)
POTASSIUM SERPL-SCNC: 5.2 MMOL/L (ref 3.5–5.1)
PROT SERPL-MCNC: 7 G/DL (ref 6–8.4)
PROT UR QL STRIP: ABNORMAL
RBC # BLD AUTO: 4.63 M/UL (ref 4–5.4)
RBC #/AREA URNS HPF: 0 /HPF (ref 0–4)
SODIUM SERPL-SCNC: 143 MMOL/L (ref 136–145)
SP GR UR STRIP: 1.02 (ref 1–1.03)
SQUAMOUS #/AREA URNS HPF: 1 /HPF
URN SPEC COLLECT METH UR: ABNORMAL
UROBILINOGEN UR STRIP-ACNC: ABNORMAL EU/DL
WBC # BLD AUTO: 5.4 K/UL (ref 3.9–12.7)
WBC #/AREA URNS HPF: 4 /HPF (ref 0–5)

## 2024-10-09 PROCEDURE — 81000 URINALYSIS NONAUTO W/SCOPE: CPT | Mod: HCNC | Performed by: INTERNAL MEDICINE

## 2024-10-09 PROCEDURE — 83615 LACTATE (LD) (LDH) ENZYME: CPT | Mod: HCNC | Performed by: INTERNAL MEDICINE

## 2024-10-09 PROCEDURE — 80053 COMPREHEN METABOLIC PANEL: CPT | Mod: HCNC | Performed by: INTERNAL MEDICINE

## 2024-10-09 PROCEDURE — 85025 COMPLETE CBC W/AUTO DIFF WBC: CPT | Mod: HCNC | Performed by: INTERNAL MEDICINE

## 2024-10-09 PROCEDURE — 84100 ASSAY OF PHOSPHORUS: CPT | Mod: HCNC | Performed by: INTERNAL MEDICINE

## 2024-10-09 PROCEDURE — 36415 COLL VENOUS BLD VENIPUNCTURE: CPT | Mod: HCNC | Performed by: INTERNAL MEDICINE

## 2024-10-09 PROCEDURE — 83735 ASSAY OF MAGNESIUM: CPT | Mod: HCNC | Performed by: INTERNAL MEDICINE

## 2024-10-10 ENCOUNTER — OFFICE VISIT (OUTPATIENT)
Dept: HEMATOLOGY/ONCOLOGY | Facility: CLINIC | Age: 69
End: 2024-10-10
Payer: MEDICARE

## 2024-10-10 DIAGNOSIS — G62.0 CHEMOTHERAPY-INDUCED PERIPHERAL NEUROPATHY: ICD-10-CM

## 2024-10-10 DIAGNOSIS — C56.3 OVARIAN CANCER, BILATERAL: Primary | ICD-10-CM

## 2024-10-10 DIAGNOSIS — C56.1 MALIGNANT NEOPLASM OF RIGHT OVARY: ICD-10-CM

## 2024-10-10 DIAGNOSIS — C78.6 PERITONEAL CARCINOMATOSIS: ICD-10-CM

## 2024-10-10 DIAGNOSIS — T45.1X5A CHEMOTHERAPY-INDUCED PERIPHERAL NEUROPATHY: ICD-10-CM

## 2024-10-10 PROCEDURE — 99215 OFFICE O/P EST HI 40 MIN: CPT | Mod: HCNC,95,,

## 2024-10-10 PROCEDURE — G2211 COMPLEX E/M VISIT ADD ON: HCPCS | Mod: HCNC,95,,

## 2024-10-10 PROCEDURE — 1111F DSCHRG MED/CURRENT MED MERGE: CPT | Mod: HCNC,CPTII,95,

## 2024-10-10 NOTE — PROGRESS NOTES
Pt informed that [facility] aide and attendance forms will need to be completed when she has next in person visit with provider. SW will assist. SW will remain available.

## 2024-10-10 NOTE — PROGRESS NOTES
Subjective:     Patient ID:?Casie Gaines is a 68 y.o. female.?? MR#: [id number]   ?   PRIMARY ONCOLOGIST: Dr. House  ?   CHIEF COMPLAINT: lab review/assessment for chemo/hospital f/u?????   ?   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   CURRENT TREATMENT: OP GYN bevacizumab liposomal DOXOrubicin Q4W     PAST TREATMENT:   02/2019 - 07/2019: Carboplatin, paclitaxel and Avastin x 6 cycles  - 08/15/2019:  salpingo-oophorectomy, ureterolysis/Omentectomy with complete pathologic response  - 10/2019 - 9/2020 Avastin maintenance   - Carboplatin and Paclitaxel (09/29/2023 - 02/14/2024)    The patient location is: [place]  The chief complaint leading to consultation is: follow-up    Visit type: audiovisual    Face to Face time with patient: 20  40 minutes of total time spent on the encounter, which includes face to face time and non-face to face time preparing to see the patient (eg, review of tests), Obtaining and/or reviewing separately obtained history, Documenting clinical information in the electronic or other health record, Independently interpreting results (not separately reported) and communicating results to the patient/family/caregiver, or Care coordination (not separately reported).         Each patient to whom he or she provides medical services by telemedicine is:  (1) informed of the relationship between the physician and patient and the respective role of any other health care provider with respect to management of the patient; and (2) notified that he or she may decline to receive medical services by telemedicine and may withdraw from such care at any time.    Notes:          HPI  Ms. 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 She was recently  ad[facility] sob secondary to pneumonia from 10/03 to 10/07/24. Pt notes continued sob on home O2. Pt states she is not feeling well today after sitting at hospital for over 2 hours yesterday and power outage in home last night which left her without supplemental O2 for several hours.       Oncology History   Peritoneal carcinomatosis   1/26/2019 Initial Diagnosis    Peritoneal carcinomatosis     2/15/2019 - 7/8/2019 Chemotherapy    Treatment Summary   Plan Name: OP GYN PACLITAXEL CARBOPLATIN (AUC 6) Q3W  Treatment Goal: Palliative  Status: Inactive  Start Date: 2/28/2019  End Date: 6/18/2019  Provider: Will Trevizo Jr., MD  Chemotherapy: bevacizumab (AVASTIN) 15 mg/kg = 1,600 mg in sodium chloride 0.9% 100 mL chemo infusion, 15 mg/kg = 1,600 mg (100 % of original dose 15 mg/kg), Intravenous, Clinic/HOD 1 time, 6 of 6 cycles  Dose modification: 15 mg/kg (original dose 15 mg/kg, Cycle 1)  Administration: 1,600 mg (2/28/2019), 1,600 mg (3/21/2019), 1,600 mg (4/11/2019), 1,600 mg (5/7/2019), 1,600 mg (5/28/2019), 1,510 mg (6/18/2019)  CARBOplatin (PARAPLATIN) 870 mg in sodium chloride 0.9% 337 mL chemo infusion, 870 mg (100 % of original dose 868.8 mg), Intravenous, Clinic/HOD 1 time, 6 of 6 cycles  Dose modification:   (original dose 868.8 mg, Cycle 1)  Administration: 870 mg (2/28/2019), 870 mg (3/21/2019), 900 mg (4/11/2019), 870 mg (5/7/2019), 660 mg (5/28/2019), 690 mg (6/18/2019)  PACLitaxel (TAXOL) 175 mg/m2 = 396 mg in sodium chloride 0.9% 566 mL chemo infusion, 175 mg/m2 = 396 mg, Intravenous, Clinic/HOD 1 time, 6 of 6 cycles  Dose modification: 140 mg/m2 (80 % of original dose 175 mg/m2, Cycle 5)  Administration: 396 mg (2/28/2019), 396 mg (3/21/2019), 396 mg (4/11/2019), 396 mg (5/7/2019), 318 mg (5/28/2019), 306 mg (6/18/2019)     10/9/2019 - 9/24/2020 Chemotherapy    Treatment Summary   Plan Name: OP BEVACIZUMAB Q3W   Treatment Goal: Maintenance  Status: Inactive  Start Date: 10/9/2019  End  Date: 9/24/2020  Provider: Oscar Mckeon MD  Chemotherapy: dexAMETHasone tablet 8 mg, 8 mg (100 % of original dose 8 mg), Oral, Clinic/HOD 1 time, 2 of 8 cycles  Dose modification: 8 mg (original dose 8 mg, Cycle 8), 8 mg (original dose 8 mg, Cycle 12)  Administration: 8 mg (7/22/2020), 8 mg (8/13/2020)  bevacizumab (AVASTIN) 15 mg/kg = 1,615 mg in sodium chloride 0.9% 100 mL chemo infusion, 15 mg/kg = 1,615 mg, Intravenous, Clinic/HOD 1 time, 11 of 17 cycles  Administration: 1,615 mg (10/9/2019), 1,615 mg (10/29/2019), 1,615 mg (12/10/2019), 1,615 mg (1/21/2020), 1,600 mg (4/2/2020), 1,615 mg (4/23/2020), 1,600 mg (7/1/2020), 1,600 mg (7/22/2020), 1,600 mg (8/13/2020), 1,795 mg (9/3/2020), 1,795 mg (9/24/2020)     9/29/2023 - 2/14/2024 Chemotherapy    Treatment Summary   Plan Name: OP GYN PACLITAXEL CARBOPLATIN desensitization (AUC 5) Q3W  Treatment Goal: Control  Status: Inactive  Start Date: 9/29/2023  End Date: 2/14/2024  Provider: Ismael Juarez MD  Chemotherapy: CARBOplatin (PARAPLATIN) 650 mg in sodium chloride 0.9% 335 mL chemo infusion, 650 mg (100 % of original dose 648 mg), Intravenous, Clinic/HOD 1 time, 6 of 17 cycles  Dose modification:   (original dose 648 mg, Cycle 1),   (original dose 853.8 mg, Cycle 2),   (original dose 730 mg, Cycle 3),   (original dose 730 mg, Cycle 4), 5 mg (original dose 730 mg, Cycle 4), 7.3 mg (original dose 730 mg, Cycle 4), 5 mg (original dose 730 mg, Cycle 4, Reason: MD Discretion, Comment: desensitization), 73 mg (original dose 730 mg, Cycle 4), 720 mg (original dose 730 mg, Cycle 4, Reason: MD Discretion, Comment: desensitization), 648.97 mg (original dose 730 mg, Cycle 4, Reason: MD Discretion, Comment: desensitization), 0.61 mg (original dose 730 mg, Cycle 5, Reason: MD Discretion, Comment: desensitization), 5 mg (original dose 730 mg, Cycle 5, Reason: MD Discretion, Comment: desens), 60.5 mg (original dose 730 mg, Cycle 5, Reason: MD Discretion, Comment:  desensitization),   (original dose 730 mg, Cycle 5, Reason: MD Discretion, Comment: new scr), 6.05 mg (original dose 730 mg, Cycle 5, Reason: Dose not tolerated, Comment: desensitization)  Administration: 650 mg (9/29/2023), 730 mg (11/10/2023), 710 mg (10/20/2023), 5 mg (1/3/2024), 5 mg (1/3/2024), 75 mg (1/3/2024), 650 mg (1/3/2024), 5 mg (2/14/2024), 5 mg (2/14/2024), 75 mg (2/14/2024), 650 mg (2/14/2024), 5 mg (1/24/2024), 60 mg (1/24/2024), 605 mg (1/24/2024), 5 mg (1/24/2024)  PACLitaxeL (TAXOL) 175 mg/m2 = 402 mg in sodium chloride 0.9% 500 mL chemo infusion, 175 mg/m2 = 402 mg, Intravenous, Clinic/HOD 1 time, 6 of 17 cycles  Administration: 402 mg (9/29/2023), 402 mg (10/20/2023), 408 mg (11/10/2023), 408 mg (1/3/2024), 408 mg (1/24/2024), 408 mg (2/14/2024)     9/6/2024 -  Chemotherapy    Treatment Summary   Plan Name: OP GYN bevacizumab liposomal DOXOrubicin Q4W  Treatment Goal: Control  Status: Active  Start Date: 9/6/2024  End Date: 7/26/2025 (Planned)  Provider: Torri House MD  Chemotherapy: DOXOrubicin liposome (DOXIL) 92 mg in D5W 611 mL chemo infusion, 40 mg/m2 = 92 mg, Intravenous, Clinic/HOD 1 time, 1 of 12 cycles  Administration: 92 mg (9/6/2024)  bevacizumab-awwb (MVASI) 1,100 mg in 0.9% NaCl 100 mL infusion, 1,125 mg, Intravenous, Clinic/HOD 1 time, 1 of 12 cycles  Administration: 1,100 mg (9/20/2024), 1,100 mg (9/6/2024)      Chemotherapy    Treatment Summary   Plan Name: OP GYN PACLITAXEL CARBOPLATIN (AUC 6) Q3W  Treatment Goal: Palliative  Status: Inactive  Start Date: 2/28/2019  End Date: 6/18/2019  Provider: Will Trevizo Jr., MD  Chemotherapy: bevacizumab (AVASTIN) 15 mg/kg = 1,600 mg in sodium chloride 0.9% 100 mL chemo infusion, 15 mg/kg = 1,600 mg (100 % of original dose 15 mg/kg), Intravenous, Clinic/HOD 1 time, 6 of 6 cycles  Dose modification: 15 mg/kg (original dose 15 mg/kg, Cycle 1)  Administration: 1,600 mg (2/28/2019), 1,600 mg (3/21/2019), 1,600 mg (4/11/2019), 1,600  mg (5/7/2019), 1,600 mg (5/28/2019), 1,510 mg (6/18/2019)    CARBOplatin (PARAPLATIN) 870 mg in sodium chloride 0.9% 337 mL chemo infusion, 870 mg (100 % of original dose 868.8 mg), Intravenous, Clinic/HOD 1 time, 6 of 6 cycles  Dose modification:   (original dose 868.8 mg, Cycle 1)  Administration: 870 mg (2/28/2019), 870 mg (3/21/2019), 900 mg (4/11/2019), 870 mg (5/7/2019), 660 mg (5/28/2019), 690 mg (6/18/2019)    PACLitaxel (TAXOL) 175 mg/m2 = 396 mg in sodium chloride 0.9% 566 mL chemo infusion, 175 mg/m2 = 396 mg, Intravenous, Clinic/HOD 1 time, 6 of 6 cycles  Dose modification: 140 mg/m2 (80 % of original dose 175 mg/m2, Cycle 5)  Administration: 396 mg (2/28/2019), 396 mg (3/21/2019), 396 mg (4/11/2019), 396 mg (5/7/2019), 318 mg (5/28/2019), 306 mg (6/18/2019)    Plan Name: OP BEVACIZUMAB Q3W   Treatment Goal: Maintenance  Status: Inactive  Start Date: 10/9/2019  End Date: 9/24/2020  Provider: Oscar Mckeon MD  Chemotherapy: bevacizumab (AVASTIN) 15 mg/kg = 1,615 mg in sodium chloride 0.9% 100 mL chemo infusion, 15 mg/kg = 1,615 mg, Intravenous, Clinic/HOD 1 time, 11 of 17 cycles  Administration: 1,615 mg (10/9/2019), 1,615 mg (10/29/2019), 1,615 mg (12/10/2019), 1,615 mg (1/21/2020), 1,600 mg (4/2/2020), 1,615 mg (4/23/2020), 1,600 mg (7/1/2020), 1,600 mg (7/22/2020), 1,600 mg (8/13/2020), 1,795 mg (9/3/2020), 1,795 mg (9/24/2020)    Plan Name: OP GYN PACLITAXEL CARBOPLATIN desensitization (AUC 5) Q3W  Treatment Goal: Control  Status: Inactive  Start Date: 9/29/2023  End Date: 2/14/2024  Provider: Ismael Juarez MD  Chemotherapy: CARBOplatin (PARAPLATIN) 650 mg in sodium chloride 0.9% 335 mL chemo infusion, 650 mg (100 % of original dose 648 mg), Intravenous, Clinic/HOD 1 time, 6 of 17 cycles  Dose modification:   (original dose 648 mg, Cycle 1),   (original dose 853.8 mg, Cycle 2),   (original dose 730 mg, Cycle 3),   (original dose 730 mg, Cycle 4), 5 mg (original dose 730 mg, Cycle 4), 7.3 mg (original  dose 730 mg, Cycle 4), 5 mg (original dose 730 mg, Cycle 4, Reason: MD Discretion, Comment: desensitization), 73 mg (original dose 730 mg, Cycle 4), 720 mg (original dose 730 mg, Cycle 4, Reason: MD Discretion, Comment: desensitization), 648.97 mg (original dose 730 mg, Cycle 4, Reason: MD Discretion, Comment: desensitization), 0.61 mg (original dose 730 mg, Cycle 5, Reason: MD Discretion, Comment: desensitization), 5 mg (original dose 730 mg, Cycle 5, Reason: MD Discretion, Comment: desens), 60.5 mg (original dose 730 mg, Cycle 5, Reason: MD Discretion, Comment: desensitization),   (original dose 730 mg, Cycle 5, Reason: MD Discretion, Comment: new scr), 6.05 mg (original dose 730 mg, Cycle 5, Reason: Dose not tolerated, Comment: desensitization)  Administration: 650 mg (9/29/2023), 730 mg (11/10/2023), 710 mg (10/20/2023), 5 mg (1/3/2024), 5 mg (1/3/2024), 75 mg (1/3/2024), 650 mg (1/3/2024), 5 mg (2/14/2024), 5 mg (2/14/2024), 75 mg (2/14/2024), 650 mg (2/14/2024), 5 mg (1/24/2024), 60 mg (1/24/2024), 605 mg (1/24/2024), 5 mg (1/24/2024)    PACLitaxeL (TAXOL) 175 mg/m2 = 402 mg in sodium chloride 0.9% 500 mL chemo infusion, 175 mg/m2 = 402 mg, Intravenous, Clinic/HOD 1 time, 6 of 17 cycles  Administration: 402 mg (9/29/2023), 402 mg (10/20/2023), 408 mg (11/10/2023), 408 mg (1/3/2024), 408 mg (1/24/2024), 408 mg (2/14/2024)    Plan Name: OP GYN bevacizumab liposomal DOXOrubicin Q4W  Treatment Goal: Control  Status: Active  Start Date: 9/3/2024 (Planned)  End Date: 7/22/2025 (Planned)  Provider: Torri House MD  Chemotherapy: DOXOrubicin liposome (DOXIL) 92 mg in D5W 296 mL chemo infusion, 40 mg/m2 = 92 mg, Intravenous, Clinic/HOD 1 time, 0 of 12 cycles    bevacizumab-awwb (MVASI) 10 mg/kg = 1,125 mg in 0.9% NaCl 100 mL infusion, 10 mg/kg = 1,125 mg, Intravenous, Clinic/HOD 1 time, 0 of 12 cycles       Malignant neoplasm of right ovary (Resolved)   2/15/2019 Initial Diagnosis    Malignant neoplasm of right  ovary     2/15/2019 - 7/8/2019 Chemotherapy    Treatment Summary   Plan Name: OP GYN PACLITAXEL CARBOPLATIN (AUC 6) Q3W  Treatment Goal: Palliative  Status: Inactive  Start Date: 2/28/2019  End Date: 6/18/2019  Provider: Will Trevizo Jr., MD  Chemotherapy: bevacizumab (AVASTIN) 15 mg/kg = 1,600 mg in sodium chloride 0.9% 100 mL chemo infusion, 15 mg/kg = 1,600 mg (100 % of original dose 15 mg/kg), Intravenous, Clinic/HOD 1 time, 6 of 6 cycles  Dose modification: 15 mg/kg (original dose 15 mg/kg, Cycle 1)  Administration: 1,600 mg (2/28/2019), 1,600 mg (3/21/2019), 1,600 mg (4/11/2019), 1,600 mg (5/7/2019), 1,600 mg (5/28/2019), 1,510 mg (6/18/2019)  CARBOplatin (PARAPLATIN) 870 mg in sodium chloride 0.9% 337 mL chemo infusion, 870 mg (100 % of original dose 868.8 mg), Intravenous, Clinic/HOD 1 time, 6 of 6 cycles  Dose modification:   (original dose 868.8 mg, Cycle 1)  Administration: 870 mg (2/28/2019), 870 mg (3/21/2019), 900 mg (4/11/2019), 870 mg (5/7/2019), 660 mg (5/28/2019), 690 mg (6/18/2019)  PACLitaxel (TAXOL) 175 mg/m2 = 396 mg in sodium chloride 0.9% 566 mL chemo infusion, 175 mg/m2 = 396 mg, Intravenous, Clinic/HOD 1 time, 6 of 6 cycles  Dose modification: 140 mg/m2 (80 % of original dose 175 mg/m2, Cycle 5)  Administration: 396 mg (2/28/2019), 396 mg (3/21/2019), 396 mg (4/11/2019), 396 mg (5/7/2019), 318 mg (5/28/2019), 306 mg (6/18/2019)     10/9/2019 - 9/24/2020 Chemotherapy    Treatment Summary   Plan Name: OP BEVACIZUMAB Q3W   Treatment Goal: Maintenance  Status: Inactive  Start Date: 10/9/2019  End Date: 9/24/2020  Provider: Oscar Mckeon MD  Chemotherapy: dexAMETHasone tablet 8 mg, 8 mg (100 % of original dose 8 mg), Oral, Clinic/HOD 1 time, 2 of 8 cycles  Dose modification: 8 mg (original dose 8 mg, Cycle 8), 8 mg (original dose 8 mg, Cycle 12)  Administration: 8 mg (7/22/2020), 8 mg (8/13/2020)  bevacizumab (AVASTIN) 15 mg/kg = 1,615 mg in sodium chloride 0.9% 100 mL chemo infusion, 15  mg/kg = 1,615 mg, Intravenous, Clinic/HOD 1 time, 11 of 17 cycles  Administration: 1,615 mg (10/9/2019), 1,615 mg (10/29/2019), 1,615 mg (12/10/2019), 1,615 mg (1/21/2020), 1,600 mg (4/2/2020), 1,615 mg (4/23/2020), 1,600 mg (7/1/2020), 1,600 mg (7/22/2020), 1,600 mg (8/13/2020), 1,795 mg (9/3/2020), 1,795 mg (9/24/2020)      Chemotherapy    Treatment Summary   Plan Name: OP GYN PACLITAXEL CARBOPLATIN (AUC 6) Q3W  Treatment Goal: Palliative  Status: Inactive  Start Date: 2/28/2019  End Date: 6/18/2019  Provider: Will Trevizo Jr., MD  Chemotherapy: bevacizumab (AVASTIN) 15 mg/kg = 1,600 mg in sodium chloride 0.9% 100 mL chemo infusion, 15 mg/kg = 1,600 mg (100 % of original dose 15 mg/kg), Intravenous, Clinic/HOD 1 time, 6 of 6 cycles  Dose modification: 15 mg/kg (original dose 15 mg/kg, Cycle 1)  Administration: 1,600 mg (2/28/2019), 1,600 mg (3/21/2019), 1,600 mg (4/11/2019), 1,600 mg (5/7/2019), 1,600 mg (5/28/2019), 1,510 mg (6/18/2019)    CARBOplatin (PARAPLATIN) 870 mg in sodium chloride 0.9% 337 mL chemo infusion, 870 mg (100 % of original dose 868.8 mg), Intravenous, Clinic/HOD 1 time, 6 of 6 cycles  Dose modification:   (original dose 868.8 mg, Cycle 1)  Administration: 870 mg (2/28/2019), 870 mg (3/21/2019), 900 mg (4/11/2019), 870 mg (5/7/2019), 660 mg (5/28/2019), 690 mg (6/18/2019)    PACLitaxel (TAXOL) 175 mg/m2 = 396 mg in sodium chloride 0.9% 566 mL chemo infusion, 175 mg/m2 = 396 mg, Intravenous, Clinic/HOD 1 time, 6 of 6 cycles  Dose modification: 140 mg/m2 (80 % of original dose 175 mg/m2, Cycle 5)  Administration: 396 mg (2/28/2019), 396 mg (3/21/2019), 396 mg (4/11/2019), 396 mg (5/7/2019), 318 mg (5/28/2019), 306 mg (6/18/2019)    Plan Name: OP BEVACIZUMAB Q3W   Treatment Goal: Maintenance  Status: Inactive  Start Date: 10/9/2019  End Date: 9/24/2020  Provider: Oscar Mckeon MD  Chemotherapy: bevacizumab (AVASTIN) 15 mg/kg = 1,615 mg in sodium chloride 0.9% 100 mL chemo infusion, 15 mg/kg =  1,615 mg, Intravenous, Clinic/HOD 1 time, 11 of 17 cycles  Administration: 1,615 mg (10/9/2019), 1,615 mg (10/29/2019), 1,615 mg (12/10/2019), 1,615 mg (1/21/2020), 1,600 mg (4/2/2020), 1,615 mg (4/23/2020), 1,600 mg (7/1/2020), 1,600 mg (7/22/2020), 1,600 mg (8/13/2020), 1,795 mg (9/3/2020), 1,795 mg (9/24/2020)    Plan Name: OP GYN PACLITAXEL CARBOPLATIN desensitization (AUC 5) Q3W  Treatment Goal: Control  Status: Inactive  Start Date: 9/29/2023  End Date: 2/14/2024  Provider: Ismael Juarez MD  Chemotherapy: CARBOplatin (PARAPLATIN) 650 mg in sodium chloride 0.9% 335 mL chemo infusion, 650 mg (100 % of original dose 648 mg), Intravenous, Clinic/HOD 1 time, 6 of 17 cycles  Dose modification:   (original dose 648 mg, Cycle 1),   (original dose 853.8 mg, Cycle 2),   (original dose 730 mg, Cycle 3),   (original dose 730 mg, Cycle 4), 5 mg (original dose 730 mg, Cycle 4), 7.3 mg (original dose 730 mg, Cycle 4), 5 mg (original dose 730 mg, Cycle 4, Reason: MD Discretion, Comment: desensitization), 73 mg (original dose 730 mg, Cycle 4), 720 mg (original dose 730 mg, Cycle 4, Reason: MD Discretion, Comment: desensitization), 648.97 mg (original dose 730 mg, Cycle 4, Reason: MD Discretion, Comment: desensitization), 0.61 mg (original dose 730 mg, Cycle 5, Reason: MD Discretion, Comment: desensitization), 5 mg (original dose 730 mg, Cycle 5, Reason: MD Discretion, Comment: desens), 60.5 mg (original dose 730 mg, Cycle 5, Reason: MD Discretion, Comment: desensitization),   (original dose 730 mg, Cycle 5, Reason: MD Discretion, Comment: new scr), 6.05 mg (original dose 730 mg, Cycle 5, Reason: Dose not tolerated, Comment: desensitization)  Administration: 650 mg (9/29/2023), 730 mg (11/10/2023), 710 mg (10/20/2023), 5 mg (1/3/2024), 5 mg (1/3/2024), 75 mg (1/3/2024), 650 mg (1/3/2024), 5 mg (2/14/2024), 5 mg (2/14/2024), 75 mg (2/14/2024), 650 mg (2/14/2024), 5 mg (1/24/2024), 60 mg (1/24/2024), 605 mg (1/24/2024), 5 mg  (1/24/2024)    PACLitaxeL (TAXOL) 175 mg/m2 = 402 mg in sodium chloride 0.9% 500 mL chemo infusion, 175 mg/m2 = 402 mg, Intravenous, Clinic/HOD 1 time, 6 of 17 cycles  Administration: 402 mg (9/29/2023), 402 mg (10/20/2023), 408 mg (11/10/2023), 408 mg (1/3/2024), 408 mg (1/24/2024), 408 mg (2/14/2024)    Plan Name: OP GYN bevacizumab liposomal DOXOrubicin Q4W  Treatment Goal: Control  Status: Active  Start Date: 9/3/2024 (Planned)  End Date: 7/22/2025 (Planned)  Provider: Torri House MD  Chemotherapy: DOXOrubicin liposome (DOXIL) 92 mg in D5W 296 mL chemo infusion, 40 mg/m2 = 92 mg, Intravenous, Clinic/HOD 1 time, 0 of 12 cycles    bevacizumab-awwb (MVASI) 10 mg/kg = 1,125 mg in 0.9% NaCl 100 mL infusion, 10 mg/kg = 1,125 mg, Intravenous, Clinic/HOD 1 time, 0 of 12 cycles       Malignant neoplasm of both ovaries (Resolved)   2/18/2019 Initial Diagnosis    Ovarian cancer     10/9/2019 - 9/24/2020 Chemotherapy    Treatment Summary   Plan Name: OP BEVACIZUMAB Q3W   Treatment Goal: Maintenance  Status: Inactive  Start Date: 10/9/2019  End Date: 9/24/2020  Provider: Oscar Mckeon MD  Chemotherapy: dexAMETHasone tablet 8 mg, 8 mg (100 % of original dose 8 mg), Oral, Clinic/HOD 1 time, 2 of 8 cycles  Dose modification: 8 mg (original dose 8 mg, Cycle 8), 8 mg (original dose 8 mg, Cycle 12)  Administration: 8 mg (7/22/2020), 8 mg (8/13/2020)  bevacizumab (AVASTIN) 15 mg/kg = 1,615 mg in sodium chloride 0.9% 100 mL chemo infusion, 15 mg/kg = 1,615 mg, Intravenous, Clinic/HOD 1 time, 11 of 17 cycles  Administration: 1,615 mg (10/9/2019), 1,615 mg (10/29/2019), 1,615 mg (12/10/2019), 1,615 mg (1/21/2020), 1,600 mg (4/2/2020), 1,615 mg (4/23/2020), 1,600 mg (7/1/2020), 1,600 mg (7/22/2020), 1,600 mg (8/13/2020), 1,795 mg (9/3/2020), 1,795 mg (9/24/2020)      Chemotherapy    Treatment Summary   Plan Name: OP GYN PACLITAXEL CARBOPLATIN (AUC 6) Q3W  Treatment Goal: Palliative  Status: Inactive  Start Date: 2/28/2019  End  Date: 6/18/2019  Provider: Will Trevizo Jr., MD  Chemotherapy: bevacizumab (AVASTIN) 15 mg/kg = 1,600 mg in sodium chloride 0.9% 100 mL chemo infusion, 15 mg/kg = 1,600 mg (100 % of original dose 15 mg/kg), Intravenous, Clinic/HOD 1 time, 6 of 6 cycles  Dose modification: 15 mg/kg (original dose 15 mg/kg, Cycle 1)  Administration: 1,600 mg (2/28/2019), 1,600 mg (3/21/2019), 1,600 mg (4/11/2019), 1,600 mg (5/7/2019), 1,600 mg (5/28/2019), 1,510 mg (6/18/2019)    CARBOplatin (PARAPLATIN) 870 mg in sodium chloride 0.9% 337 mL chemo infusion, 870 mg (100 % of original dose 868.8 mg), Intravenous, Clinic/HOD 1 time, 6 of 6 cycles  Dose modification:   (original dose 868.8 mg, Cycle 1)  Administration: 870 mg (2/28/2019), 870 mg (3/21/2019), 900 mg (4/11/2019), 870 mg (5/7/2019), 660 mg (5/28/2019), 690 mg (6/18/2019)    PACLitaxel (TAXOL) 175 mg/m2 = 396 mg in sodium chloride 0.9% 566 mL chemo infusion, 175 mg/m2 = 396 mg, Intravenous, Clinic/HOD 1 time, 6 of 6 cycles  Dose modification: 140 mg/m2 (80 % of original dose 175 mg/m2, Cycle 5)  Administration: 396 mg (2/28/2019), 396 mg (3/21/2019), 396 mg (4/11/2019), 396 mg (5/7/2019), 318 mg (5/28/2019), 306 mg (6/18/2019)    Plan Name: OP BEVACIZUMAB Q3W   Treatment Goal: Maintenance  Status: Inactive  Start Date: 10/9/2019  End Date: 9/24/2020  Provider: Oscar Mckeon MD  Chemotherapy: bevacizumab (AVASTIN) 15 mg/kg = 1,615 mg in sodium chloride 0.9% 100 mL chemo infusion, 15 mg/kg = 1,615 mg, Intravenous, Clinic/HOD 1 time, 11 of 17 cycles  Administration: 1,615 mg (10/9/2019), 1,615 mg (10/29/2019), 1,615 mg (12/10/2019), 1,615 mg (1/21/2020), 1,600 mg (4/2/2020), 1,615 mg (4/23/2020), 1,600 mg (7/1/2020), 1,600 mg (7/22/2020), 1,600 mg (8/13/2020), 1,795 mg (9/3/2020), 1,795 mg (9/24/2020)    Plan Name: OP GYN PACLITAXEL CARBOPLATIN desensitization (AUC 5) Q3W  Treatment Goal: Control  Status: Inactive  Start Date: 9/29/2023  End Date: 2/14/2024  Provider: Ismael  ELI Juarez MD  Chemotherapy: CARBOplatin (PARAPLATIN) 650 mg in sodium chloride 0.9% 335 mL chemo infusion, 650 mg (100 % of original dose 648 mg), Intravenous, Clinic/[facility] 1 time, 6 of 17 cycles  Dose modification:   (original dose 648 mg, Cycle 1),   (original dose 853.8 mg, Cycle 2),   (original dose 730 mg, Cycle 3),   (original dose 730 mg, Cycle 4), 5 mg (original dose 730 mg, Cycle 4), 7.3 mg (original dose 730 mg, Cycle 4), 5 mg (original dose 730 mg, Cycle 4, Reason: MD Discretion, Comment: desensitization), 73 mg (original dose 730 mg, Cycle 4), 720 mg (original dose 730 mg, Cycle 4, Reason: MD Discretion, Comment: desensitization), 648.97 mg (original dose 730 mg, Cycle 4, Reason: MD Discretion, Comment: desensitization), 0.61 mg (original dose 730 mg, Cycle 5, Reason: MD Discretion, Comment: desensitization), 5 mg (original dose 730 mg, Cycle 5, Reason: MD Discretion, Comment: desens), 60.5 mg (original dose 730 mg, Cycle 5, Reason: MD Discretion, Comment: desensitization),   (original dose 730 mg, Cycle 5, Reason: MD Discretion, Comment: new scr), 6.05 mg (original dose 730 mg, Cycle 5, Reason: Dose not tolerated, Comment: desensitization)  Administration: 650 mg (9/29/2023), 730 mg (11/10/2023), 710 mg (10/20/2023), 5 mg (1/3/2024), 5 mg (1/3/2024), 75 mg (1/3/2024), 650 mg (1/3/2024), 5 mg (2/14/2024), 5 mg (2/14/2024), 75 mg (2/14/2024), 650 mg (2/14/2024), 5 mg (1/24/2024), 60 mg (1/24/2024), 605 mg (1/24/2024), 5 mg (1/24/2024)    PACLitaxeL (TAXOL) 175 mg/m2 = 402 mg in sodium chloride 0.9% 500 mL chemo infusion, 175 mg/m2 = 402 mg, Intravenous, Clinic/HOD 1 time, 6 of 17 cycles  Administration: 402 mg (9/29/2023), 402 mg (10/20/2023), 408 mg (11/10/2023), 408 mg (1/3/2024), 408 mg (1/24/2024), 408 mg (2/14/2024)    Plan Name: OP GYN bevacizumab liposomal DOXOrubicin Q4W  Treatment Goal: Control  Status: Active  Start Date: 9/3/2024 (Planned)  End Date: 7/22/2025 (Planned)  Provider: Torri  MD Harsh  Chemotherapy: DOXOrubicin liposome (DOXIL) 92 mg in D5W 296 mL chemo infusion, 40 mg/m2 = 92 mg, Intravenous, Clinic/HOD 1 time, 0 of 12 cycles    bevacizumab-awwb (MVASI) 10 mg/kg = 1,125 mg in 0.9% NaCl 100 mL infusion, 10 mg/kg = 1,125 mg, Intravenous, Clinic/HOD 1 time, 0 of 12 cycles       Ovarian cancer, bilateral   8/15/2019 Initial Diagnosis    Ovarian cancer, bilateral     10/9/2019 - 9/24/2020 Chemotherapy    Treatment Summary   Plan Name: OP BEVACIZUMAB Q3W   Treatment Goal: Maintenance  Status: Inactive  Start Date: 10/9/2019  End Date: 9/24/2020  Provider: Oscar Mckeon MD  Chemotherapy: dexAMETHasone tablet 8 mg, 8 mg (100 % of original dose 8 mg), Oral, [facility] 1 time, 2 of 8 cycles  Dose modification: 8 mg (original dose 8 mg, Cycle 8), 8 mg (original dose 8 mg, Cycle 12)  Administration: 8 mg (7/22/2020), 8 mg (8/13/2020)  bevacizumab (AVASTIN) 15 mg/kg = 1,615 mg in sodium chloride 0.9% 100 mL chemo infusion, 15 mg/kg = 1,615 mg, Intravenous, [facility] 1 time, 11 of 17 cycles  Administration: 1,615 mg (10/9/2019), 1,615 mg (10/29/2019), 1,615 mg (12/10/2019), 1,615 mg (1/21/2020), 1,600 mg (4/2/2020), 1,615 mg (4/23/2020), 1,600 mg (7/1/2020), 1,600 mg (7/22/2020), 1,600 mg (8/13/2020), 1,795 mg (9/3/2020), 1,795 mg (9/24/2020)     9/29/2023 - 2/14/2024 Chemotherapy    Treatment Summary   Plan Name: OP GYN PACLITAXEL CARBOPLATIN desensitization (AUC 5) Q3W  Treatment Goal: Control  Status: Inactive  Start Date: 9/29/2023  End Date: 2/14/2024  Provider: Ismael Juarez MD  Chemotherapy: CARBOplatin (PARAPLATIN) 650 mg in sodium chloride 0.9% 335 mL chemo infusion, 650 mg (100 % of original dose 648 mg), Intravenous, Clinic/[facility] 1 time, 6 of 17 cycles  Dose modification:   (original dose 648 mg, Cycle 1),   (original dose 853.8 mg, Cycle 2),   (original dose 730 mg, Cycle 3),   (original dose 730 mg, Cycle 4), 5 mg (original dose 730 mg, Cycle 4), 7.3 mg (original dose 730 mg, Cycle  4), 5 mg (original dose 730 mg, Cycle 4, Reason: MD Discretion, Comment: desensitization), 73 mg (original dose 730 mg, Cycle 4), 720 mg (original dose 730 mg, Cycle 4, Reason: MD Discretion, Comment: desensitization), 648.97 mg (original dose 730 mg, Cycle 4, Reason: MD Discretion, Comment: desensitization), 0.61 mg (original dose 730 mg, Cycle 5, Reason: MD Discretion, Comment: desensitization), 5 mg (original dose 730 mg, Cycle 5, Reason: MD Discretion, Comment: desens), 60.5 mg (original dose 730 mg, Cycle 5, Reason: MD Discretion, Comment: desensitization),   (original dose 730 mg, Cycle 5, Reason: MD Discretion, Comment: new scr), 6.05 mg (original dose 730 mg, Cycle 5, Reason: Dose not tolerated, Comment: desensitization)  Administration: 650 mg (9/29/2023), 730 mg (11/10/2023), 710 mg (10/20/2023), 5 mg (1/3/2024), 5 mg (1/3/2024), 75 mg (1/3/2024), 650 mg (1/3/2024), 5 mg (2/14/2024), 5 mg (2/14/2024), 75 mg (2/14/2024), 650 mg (2/14/2024), 5 mg (1/24/2024), 60 mg (1/24/2024), 605 mg (1/24/2024), 5 mg (1/24/2024)  PACLitaxeL (TAXOL) 175 mg/m2 = 402 mg in sodium chloride 0.9% 500 mL chemo infusion, 175 mg/m2 = 402 mg, Intravenous, Clinic/[facility] 1 time, 6 of 17 cycles  Administration: 402 mg (9/29/2023), 402 mg (10/20/2023), 408 mg (11/10/2023), 408 mg (1/3/2024), 408 mg (1/24/2024), 408 mg (2/14/2024)     9/6/2024 -  Chemotherapy    Treatment Summary   Plan Name: OP GYN bevacizumab liposomal DOXOrubicin Q4W  Treatment Goal: Control  Status: Active  Start Date: 9/6/2024  End Date: 7/26/2025 (Planned)  Provider: Torri House MD  Chemotherapy: DOXOrubicin liposome (DOXIL) 92 mg in D5W 611 mL chemo infusion, 40 mg/m2 = 92 mg, Intravenous, [facility] 1 time, 1 of 12 cycles  Administration: 92 mg (9/6/2024)  bevacizumab-awwb (MVASI) 1,100 mg in 0.9% NaCl 100 mL infusion, 1,125 mg, Intravenous, [facility] 1 time, 1 of 12 cycles  Administration: 1,100 mg (9/20/2024), 1,100 mg (9/6/2024)      Chemotherapy     Treatment Summary   Plan Name: OP GYN PACLITAXEL CARBOPLATIN (AUC 6) Q3W  Treatment Goal: Palliative  Status: Inactive  Start Date: 2/28/2019  End Date: 6/18/2019  Provider: Will Trevizo Jr., MD  Chemotherapy: bevacizumab (AVASTIN) 15 mg/kg = 1,600 mg in sodium chloride 0.9% 100 mL chemo infusion, 15 mg/kg = 1,600 mg (100 % of original dose 15 mg/kg), Intravenous, [facility] 1 time, 6 of 6 cycles  Dose modification: 15 mg/kg (original dose 15 mg/kg, Cycle 1)  Administration: 1,600 mg (2/28/2019), 1,600 mg (3/21/2019), 1,600 mg (4/11/2019), 1,600 mg (5/7/2019), 1,600 mg (5/28/2019), 1,510 mg (6/18/2019)    CARBOplatin (PARAPLATIN) 870 mg in sodium chloride 0.9% 337 mL chemo infusion, 870 mg (100 % of original dose 868.8 mg), Intravenous, Clinic/[facility] 1 time, 6 of 6 cycles  Dose modification:   (original dose 868.8 mg, Cycle 1)  Administration: 870 mg (2/28/2019), 870 mg (3/21/2019), 900 mg (4/11/2019), 870 mg (5/7/2019), 660 mg (5/28/2019), 690 mg (6/18/2019)    PACLitaxel (TAXOL) 175 mg/m2 = 396 mg in sodium chloride 0.9% 566 mL chemo infusion, 175 mg/m2 = 396 mg, Intravenous, Clinic/[facility] 1 time, 6 of 6 cycles  Dose modification: 140 mg/m2 (80 % of original dose 175 mg/m2, Cycle 5)  Administration: 396 mg (2/28/2019), 396 mg (3/21/2019), 396 mg (4/11/2019), 396 mg (5/7/2019), 318 mg (5/28/2019), 306 mg (6/18/2019)    Plan Name: OP BEVACIZUMAB Q3W   Treatment Goal: Maintenance  Status: Inactive  Start Date: 10/9/2019  End Date: 9/24/2020  Provider: Oscar Mckeon MD  Chemotherapy: bevacizumab (AVASTIN) 15 mg/kg = 1,615 mg in sodium chloride 0.9% 100 mL chemo infusion, 15 mg/kg = 1,615 mg, Intravenous, Clinic/[facility] 1 time, 11 of 17 cycles  Administration: 1,615 mg (10/9/2019), 1,615 mg (10/29/2019), 1,615 mg (12/10/2019), 1,615 mg (1/21/2020), 1,600 mg (4/2/2020), 1,615 mg (4/23/2020), 1,600 mg (7/1/2020), 1,600 mg (7/22/2020), 1,600 mg (8/13/2020), 1,795 mg (9/3/2020), 1,795 mg (9/24/2020)    Plan Name: OP GYN  PACLITAXEL CARBOPLATIN desensitization (AUC 5) Q3W  Treatment Goal: Control  Status: Inactive  Start Date: 9/29/2023  End Date: 2/14/2024  Provider: Ismael Juarez MD  Chemotherapy: CARBOplatin (PARAPLATIN) 650 mg in sodium chloride 0.9% 335 mL chemo infusion, 650 mg (100 % of original dose 648 mg), Intravenous, Clinic/[facility] 1 time, 6 of 17 cycles  Dose modification:   (original dose 648 mg, Cycle 1),   (original dose 853.8 mg, Cycle 2),   (original dose 730 mg, Cycle 3),   (original dose 730 mg, Cycle 4), 5 mg (original dose 730 mg, Cycle 4), 7.3 mg (original dose 730 mg, Cycle 4), 5 mg (original dose 730 mg, Cycle 4, Reason: MD Discretion, Comment: desensitization), 73 mg (original dose 730 mg, Cycle 4), 720 mg (original dose 730 mg, Cycle 4, Reason: MD Discretion, Comment: desensitization), 648.97 mg (original dose 730 mg, Cycle 4, Reason: MD Discretion, Comment: desensitization), 0.61 mg (original dose 730 mg, Cycle 5, Reason: MD Discretion, Comment: desensitization), 5 mg (original dose 730 mg, Cycle 5, Reason: MD Discretion, Comment: desens), 60.5 mg (original dose 730 mg, Cycle 5, Reason: MD Discretion, Comment: desensitization),   (original dose 730 mg, Cycle 5, Reason: MD Discretion, Comment: new scr), 6.05 mg (original dose 730 mg, Cycle 5, Reason: Dose not tolerated, Comment: desensitization)  Administration: 650 mg (9/29/2023), 730 mg (11/10/2023), 710 mg (10/20/2023), 5 mg (1/3/2024), 5 mg (1/3/2024), 75 mg (1/3/2024), 650 mg (1/3/2024), 5 mg (2/14/2024), 5 mg (2/14/2024), 75 mg (2/14/2024), 650 mg (2/14/2024), 5 mg (1/24/2024), 60 mg (1/24/2024), 605 mg (1/24/2024), 5 mg (1/24/2024)    PACLitaxeL (TAXOL) 175 mg/m2 = 402 mg in sodium chloride 0.9% 500 mL chemo infusion, 175 mg/m2 = 402 mg, Intravenous, Clinic/[facility] 1 time, 6 of 17 cycles  Administration: 402 mg (9/29/2023), 402 mg (10/20/2023), 408 mg (11/10/2023), 408 mg (1/3/2024), 408 mg (1/24/2024), 408 mg (2/14/2024)    Plan Name: OP GYN bevacizumab  liposomal DOXOrubicin Q4W  Treatment Goal: Control  Status: Active  Start Date: 9/3/2024 (Planned)  End Date: 2025 (Planned)  Provider: Torri House MD  Chemotherapy: DOXOrubicin liposome (DOXIL) 92 mg in D5W 296 mL chemo infusion, 40 mg/m2 = 92 mg, Intravenous, [facility] 1 time, 0 of 12 cycles    bevacizumab-awwb (MVASI) 10 mg/kg = 1,125 mg in 0.9% NaCl 100 mL infusion, 10 mg/kg = 1,125 mg, Intravenous, Clinic/[facility] 1 time, 0 of 12 cycles          Social History     Socioeconomic History    Marital status:    Tobacco Use    Smoking status: Former     Current packs/day: 0.00     Average packs/day: 1 pack/day for 25.0 years (25.0 ttl pk-yrs)     Types: Cigarettes     Start date: 10/1/1993     Quit date: 10/1/2018     Years since quittin.0    Smokeless tobacco: Never    Tobacco comments:     States started quit 2 months ago after 30 years   Substance and Sexual Activity    Alcohol use: Yes     Comment: occasionally  No alcohol 72h prior to sx    Drug use: No    Sexual activity: Not Currently     Partners: Male     Comment: hyst; mut monog   Social History Narrative    Live w/ spouse and 2 dogs      Social Drivers of Health     Financial Resource Strain: Low Risk  (10/3/2024)    Overall Financial Resource Strain (CARDIA)     Difficulty of Paying Living Expenses: Not hard at all   Food Insecurity: No Food Insecurity (10/3/2024)    Hunger Vital Sign     Worried About Running Out of Food in the Last Year: Never true     Ran Out of Food in the Last Year: Never true   Transportation Needs: No Transportation Needs (10/3/2024)    TRANSPORTATION NEEDS     Transportation : No   Physical Activity: Unknown (2023)    Exercise Vital Sign     Days of Exercise per Week: 0 days   Stress: No Stress Concern Present (10/3/2024)    [employer] [place] [employer] - Occupational Stress Questionnaire     Feeling of Stress : Not at all   Housing Stability: Low Risk  (10/3/2024)    Housing Stability Vital  Sign     Unable to Pay for Housing in the Last Year: No     Homeless in the Last Year: No      Family History   Problem Relation Name Age of Onset    Glaucoma Mother      No Known Problems Father      Colon cancer Brother      Breast cancer Maternal Aunt      Breast cancer Paternal Aunt      Ovarian cancer Paternal Aunt      Anesthesia problems Other cousin     Thrombophilia Neg Hx        Past Surgical History:   Procedure Laterality Date    breast reduction  10/02/2018    CATARACT EXTRACTION Bilateral     2023     SECTION      X 1    COLONOSCOPY N/A 2021    Procedure: COLONOSCOPY;  Surgeon: Paulette Rojas MD;  Location: [facility];  Service: Gastroenterology;  Laterality: N/A;    CYSTOSCOPY W/ URETERAL STENT PLACEMENT Right 2019    Procedure: CYSTOSCOPY, WITH URETERAL STENT INSERTION;  Surgeon: Timmy Santiago IV, MD;  Location: [facility] OR;  Service: Urology;  Laterality: Right;    CYSTOSCOPY W/ URETERAL STENT REMOVAL  10/04/2019    DILATION AND CURETTAGE OF UTERUS      HYSTERECTOMY      RALH for fibroids (still has ovaries)    INSERTION OF VENOUS ACCESS PORT Left 2019    Procedure: INSERTION, VENOUS ACCESS PORT;  Surgeon: Ulisses Monzon MD;  Location: [facility] OR;  Service: General;  Laterality: Left;  Left internal jugular     LYSIS OF ADHESIONS OF URETER N/A 08/15/2019    Procedure: URETEROLYSIS;  Surgeon: Ismael Juarez MD;  Location: [facility] OR;  Service: OB/GYN;  Laterality: N/A;    OMENTECTOMY N/A 08/15/2019    Procedure: OMENTECTOMY;  Surgeon: Ismael Juarez MD;  Location: [facility] OR;  Service: OB/GYN;  Laterality: N/A;    OOPHORECTOMY      RETROGRADE PYELOGRAPHY Right 2019    Procedure: PYELOGRAM, RETROGRADE;  Surgeon: Timmy Santiago IV, MD;  Location: [facility];  Service: Urology;  Laterality: Right;    ROBOT-ASSISTED LAPAROSCOPIC SALPINGO-OOPHORECTOMY USING DA TIA XI Bilateral 08/15/2019    Procedure: XI ROBOTIC SALPINGO-OOPHORECTOMY;  Surgeon: Ismael Juarez MD;   Location: [facility];  Service: OB/GYN;  Laterality: Bilateral;    TOTAL REDUCTION MAMMOPLASTY  2018    TUBAL LIGATION          Review of Systems   Constitutional:  Positive for activity change and fatigue. Negative for appetite change, chills and fever.   HENT:  Negative for congestion, facial swelling, nosebleeds, rhinorrhea, sore throat and trouble swallowing.    Eyes:  Negative for photophobia, pain and visual disturbance.   Respiratory:  Positive for shortness of breath (currrently on O2). Negative for cough and wheezing.    Cardiovascular:  Negative for chest pain, palpitations and leg swelling.   Gastrointestinal:  Negative for abdominal distention, abdominal pain, anal bleeding, blood in stool, constipation, diarrhea, nausea, rectal pain and vomiting.   Genitourinary:  Negative for difficulty urinating, dysuria, hematuria and vaginal bleeding.   Musculoskeletal:  Negative for arthralgias.   Skin:  Negative for color change, pallor, rash and wound.   Neurological:  Positive for weakness. Negative for dizziness, light-headedness, numbness and headaches.   Hematological:  Negative for adenopathy. Does not bruise/bleed easily.   Psychiatric/Behavioral:  Positive for dysphoric mood. The patient is nervous/anxious.        ?   A comprehensive 14-point review of systems was reviewed with patient and was negative other than as specified above.   ?     Objective:      Physical Exam  Vitals reviewed: virtual visit.   Neurological:      Mental Status: She is alert.           ?   There were no vitals filed for this visit.   ?       ?   Laboratory:  ?   No visits with results within 1 Day(s) from this visit.   Latest known visit with results is:   Lab Visit on 10/09/2024   Component Date Value Ref Range Status    Phosphorus 10/09/2024 3.3  2.7 - 4.5 mg/dL Final    Magnesium 10/09/2024 2.1  1.6 - 2.6 mg/dL Final    Sodium 10/09/2024 143  136 - 145 mmol/L Final    Potassium 10/09/2024 5.2 (H)  3.5 - 5.1 mmol/L Final     Chloride 10/09/2024 108  95 - 110 mmol/L Final    CO2 10/09/2024 23  23 - 29 mmol/L Final    Glucose 10/09/2024 88  70 - 110 mg/dL Final    BUN 10/09/2024 31 (H)  8 - 23 mg/dL Final    Creatinine 10/09/2024 1.2  0.5 - 1.4 mg/dL Final    Calcium 10/09/2024 9.8  8.7 - 10.5 mg/dL Final    Total Protein 10/09/2024 7.0  6.0 - 8.4 g/dL Final    Albumin 10/09/2024 2.9 (L)  3.5 - 5.2 g/dL Final    Total Bilirubin 10/09/2024 0.8  0.1 - 1.0 mg/dL Final    Alkaline Phosphatase 10/09/2024 451 (H)  55 - 135 U/L Final    AST 10/09/2024 313 (H)  10 - 40 U/L Final    ALT 10/09/2024 167 (H)  10 - 44 U/L Final    eGFR 10/09/2024 49 (A)  >60 mL/min/1.73 m^2 Final    Anion Gap 10/09/2024 12  8 - 16 mmol/L Final    WBC 10/09/2024 5.40  3.90 - 12.70 K/uL Final    RBC 10/09/2024 4.63  4.00 - 5.40 M/uL Final    Hemoglobin 10/09/2024 12.7  12.0 - 16.0 g/dL Final    Hematocrit 10/09/2024 41.4  37.0 - 48.5 % Final    MCV 10/09/2024 89  82 - 98 fL Final    MCH 10/09/2024 27.4  27.0 - 31.0 pg Final    MCHC 10/09/2024 30.7 (L)  32.0 - 36.0 g/dL Final    RDW 10/09/2024 18.4 (H)  11.5 - 14.5 % Final    Platelets 10/09/2024 330  150 - 450 K/uL Final    MPV 10/09/2024 10.1  9.2 - 12.9 fL Final    Immature Granulocytes 10/09/2024 0.4  0.0 - 0.5 % Final    Gran # (ANC) 10/09/2024 3.4  1.8 - 7.7 K/uL Final    Immature Grans (Abs) 10/09/2024 0.02  0.00 - 0.04 K/uL Final    Lymph # 10/09/2024 1.1  1.0 - 4.8 K/uL Final    Mono # 10/09/2024 0.8  0.3 - 1.0 K/uL Final    Eos # 10/09/2024 0.0  0.0 - 0.5 K/uL Final    Baso # 10/09/2024 0.11  0.00 - 0.20 K/uL Final    nRBC 10/09/2024 0  0 /100 WBC Final    Gran % 10/09/2024 62.2  38.0 - 73.0 % Final    Lymph % 10/09/2024 19.6  18.0 - 48.0 % Final    Mono % 10/09/2024 15.4 (H)  4.0 - 15.0 % Final    Eosinophil % 10/09/2024 0.4  0.0 - 8.0 % Final    Basophil % 10/09/2024 2.0 (H)  0.0 - 1.9 % Final    Differential Method 10/09/2024 Automated   Final    LD 10/09/2024 2,634 (H)  110 - 260 U/L Final    Specimen UA  10/09/2024 Urine, Clean Catch   Final    Color, UA 10/09/2024 Yellow  Yellow, Straw, [place] Final    Appearance, UA 10/09/2024 Clear  Clear Final    pH, UA 10/09/2024 6.0  5.0 - 8.0 Final    Specific Gravity, UA 10/09/2024 1.025  1.005 - 1.030 Final    Protein, UA 10/09/2024 1+ (A)  Negative Final    Glucose, UA 10/09/2024 Negative  Negative Final    Ketones, UA 10/09/2024 Trace (A)  Negative Final    Bilirubin (UA) 10/09/2024 Negative  Negative Final    Occult Blood UA 10/09/2024 Negative  Negative Final    Nitrite, UA 10/09/2024 Negative  Negative Final    Urobilinogen, UA 10/09/2024 2.0-3.0 (A)  <2.0 EU/dL Final    Leukocytes, UA 10/09/2024 Negative  Negative Final    RBC, UA 10/09/2024 0  0 - 4 /hpf Final    WBC, UA 10/09/2024 4  0 - 5 /hpf Final    Bacteria 10/09/2024 Rare  None-Occ /hpf Final    Squam Epithel, UA 10/09/2024 1  /hpf Final    Hyaline Casts, UA 10/09/2024 7 (A)  0-1/lpf /lpf Final    Granular Casts, UA 10/09/2024 3 (A)  None /lpf Final    Microscopic Comment 10/09/2024 SEE COMMENT   Final      ?   Imaging:    Results for orders placed or performed during the hospital encounter of 10/11/24 (from the past 2160 hours)   NM PET CT FDG Skull Base to Mid Thigh    Impression    Overall there has been progression of disease.    -There is continued hypermetabolic adenopathy within the neck, chest (mediastinum and chest wall).  Some of these areas are stable with some nodes slightly diminished and some decreased degrees of FDG uptake within some of these nodes.    -However, there is extensive progression of peritoneal carcinomatosis throughout the abdomen and pelvis with increasing number and size of peritoneal hypermetabolic nodularity and increasing amount of ascites.  There is also large amount heterogeneous FDG uptake within the left hepatic lobe concerning for hepatic metastatic disease which is new.    -there has also been interval development moderate right-sided hydronephrosis and hydroureter with  ureter likely being obstructed by peritoneal implants.    -increasing small to moderate bilateral loculated pleural effusions.  Extensive mixed interstitial and ground-glass infiltrates throughout the lungs.  Question pneumonia/post treatment related changes.  Lymphangitic carcinomatosis could appear similar      Electronically signed by: Andres Echeverria MD  Date:    10/11/2024  Time:    17:03     *Note: Due to a large number of results and/or encounters for the requested time period, some results have not been displayed. A complete set of results can be found in Results Review.        Results for orders placed or performed during the hospital encounter of 10/02/24 (from the past 2160 hours)   CT Abdomen Pelvis With IV Contrast Routine Oral Contrast    Narrative    EXAMINATION:  CT ABDOMEN PELVIS WITH IV CONTRAST    CLINICAL HISTORY:  Metastatic disease evaluation;US with possible liver mass;    TECHNIQUE:  Low dose axial images, sagittal and coronal reformations were obtained from the lung bases to the pubic symphysis following the IV administration of 100 mL of Omnipaque 350.  30 mL of oral Omnipaque 350 was administered.    COMPARISON:  None    FINDINGS:  Heart: Normal size. No effusion.    Lung Bases: Suspect interstitial edema at the lung bases.  No discrete mass within the visualized segments.  Atelectasis seen at the lung bases bilaterally.  Small bilateral pleural effusions.  Significant mediastinal and hilar adenopathy only partially visualized.  Large subcarinal node measures 4 x 2.2 cm.    Liver: Normal size.  Heterogeneous attenuation and enhancement throughout the liver.  Ill-defined lesion within the left hepatic lobe measuring at least 8 x 5.8 cm concerning for primary or secondary malignancy.  Multiple smaller lesions are seen scattered throughout the liver largest within the right hepatic lobe measures 15 mm sequence 2 image 69.  Portal vein and hepatic veins are patent.    Abnormal enlarged jovan  hepatis all lymph nodes concerning for metastatic disease largest measures approximately 3 x 2.4 cm sequence 2 image 69.    Gallbladder: No calcified gallstones.    Bile Ducts: No dilatation.    Pancreas: Indeterminate hypodense lesion within the uncinate of the pancreas measuring 1 cm.  No peripancreatic fat stranding.    Spleen: Normal.    Adrenals: Normal.    Kidneys/Ureters: Normal enhancement.  Moderate right-sided hydronephrosis with transition to nondilated distal ureter within the mid ureter could reflect stricture.  No mass or stone identified.  No mass or  hydroureteronephrosis.    Bladder: No wall thickening.    Reproductive organs: Post hysterectomy.    GI Tract/Mesentery: Stomach is collapsed which limits evaluation for mass.  Mild rugal fold thickening could reflect gastritis or portal gastropathy.  Underlying lesion not excluded.  Appendix is normal.    No evidence of small-bowel obstruction.  Extensive nodes are seen within the mesentery which may be metastatic or reactive.  There is diverticulosis within the large bowel.  Focal thickening within the mid sigmoid colon sequence 2 image [id number].  Cannot exclude primary malignancy..    Peritoneal Space: Scattered small volume ascites throughout the abdomen and pelvis.  No free air.    Nodular thickening of the omentum concerning for omental cake/metastatic disease.    Retroperitoneum: Abnormal enlarged retroperitoneal lymph nodes with the largest conglomerate of nodes measuring approximately 2.6 x 1.9 cm.    Abdominal wall: Multiple midline ventral hernias including upper hernia containing fat and omentum measuring 3 mm neck.  Second hernia within the midline has a 3.1 mm neck with bowel contents but without evidence of obstruction.  Small fat containing umbilical hernia.    Vasculature: No aneurysm.  Moderate atherosclerotic disease.    Bones: No acute fracture.  Moderate degenerative changes lower lumbar spine.  No suspicious lytic or sclerotic  lesions.      Impression    Widespread metastatic disease suspected with abnormal lymph nodes within the retroperitoneum, jovan hepatis, mediastinum as well as multiple abnormal liver lesions and focal thickening within the large bowel.  Patient needs tissue sampling of liver lesion.  Recommend colonoscopy and possible biopsy of sigmoid lesion..    All CT scans at this facility use dose modulation, iterative reconstruction, and/or weight based dosing when appropriate to reduce radiation dose to as low as reasonable achievable.      Electronically signed by: Chandrakant Fermin MD  Date:    10/07/2024  Time:    07:28   CTA Chest Non-Coronary (PE Studies)    Narrative    Exam: CTA CHEST NON CORONARY (PE STUDIES)    Comparison: 05/18/2024    Clinical Indication:  Concern for pulmonary emboli    Technique: CT of the chest is performed after the administration of contrast. Data acquisition was obtained during the pulmonary arterial phase of enhancement.  CT Angiogram (CTA) of the chest was performed with 3D reconstruction. Sagittal, coronal and MIP images were also obtained.    Findings: Central venous catheter on the left with the tip in the low SVC.    Adequate bolus for evaluation.  No pulmonary emboli.  No pericardial effusion or significant coronary artery calcifications.    There are new enlarged mediastinal and hilar lymph nodes measuring up to 2 cm in the prevascular space and 2.5 cm inferior to the paresh.  Enlarged lymph node in the right hilar region measuring 2.1 cm.  No axillary lymphadenopathy.    There are new moderate-sized bilateral pleural effusions with a loculated component extending along the left lateral hemithorax continuing into the major fissure.  Atelectasis or consolidation adjacent to the pleural effusions.    There is interlobular septal thickening bilaterally with associated groundglass opacity.  Additional atelectasis or consolidation anterior to the right major fissure    New irregular soft  tissue mass in the left upper quadrant measuring approximately 6 cm..  Borderline-enlarged retroperitoneal lymph nodes adjacent to the superior mesenteric artery.    No concerning lytic or sclerotic bony lesions.        Impression    1.  No pulmonary emboli.  2.  New moderate-sized bilateral pleural effusions as described above with adjacent atelectasis or consolidation.  3.  No enlarged mediastinal and hilar lymph nodes.  4.  Intralobular septal thickening and associated groundglass opacity bilaterally felt to represent edema.  5.  New irregular soft tissue mass in the left upper quadrant concerning for carcinomatosis.    All CT scans at [this location] are performed using dose modulation techniques as appropriate to a performed exam including the following: automated exposure control; adjustment of the mA and/or kV according to patient size (this includes techniques or standardized protocols for targeted exams where dose is matched to indication / reason for exam; i.e. extremities or head); use of iterative reconstruction technique.    Finalized on: 10/2/2024 10:52 PM By:  Gagan Abarca  BRR# [id number]      2024-10-02 [id number]    [facility]   Results for orders placed or performed during the hospital encounter of 08/22/24 (from the past 2160 hours)   CT Biopsy Lymph Node    Narrative    EXAMINATION:  CT BIOPSY LYMPH NODE (XPD)    CLINICAL HISTORY:  L subpectoral LN;  Generalized enlarged lymph nodes    TECHNIQUE:  Medications:    Moderate sedation using versed and fentanyl was provided with and trained observer monitoring the patient's vital signs and level of consciousness. The total time of sedation was 20 minutes.    1% lidocaine locally    :OCTAVIO Fermin    Complications: None    COMPARISON:  07/18/2024    FINDINGS:  Procedure: The risks and benefits of this procedure were discussed with the patient, written informed consent was obtained.  The patient was placed supine on the CT gantry.  Preprocedural  imaging revealed subpectoral lymph node which was hot on PET scan.  A suitable skin site for biopsy was selected.  IV fentanyl and Versed was given for conscious sedation.  The skin site was prepped and draped in sterile fashion.  The skin was anesthetized with 1% lidocaine.    Using CT guidance a 19-gauge introducer needle was guided into the mass.  Prior to biopsy appropriate needle positioning was confirmed with CT. Two 20-gauge samples were acquired.  Specimens were sent in formalin and RPMI solution.  The stylet was replaced and the needle was removed.    Postprocedural imaging was acquired. The site was bandaged sterilely. The patient left the room in stable condition.      Impression    CT guided biopsy of a subpectoral lymph node.    All CT scans at this facility use dose modulation, iterative reconstruction, and/or weight based dosing when appropriate to reduce radiation dose to as low as reasonable achievable.      Electronically signed by: Chandrakant Fermin MD  Date:    08/22/2024  Time:    12:38     *Note: Due to a large number of results and/or encounters for the requested time period, some results have not been displayed. A complete set of results can be found in Results Review.        ?   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 Patient needs tissue sampling of liver lesion. Recommend colonoscopy and possible biopsy of sigmoid lesion.    Case discussed with primary oncologist plan for 3 additional cycles of chemotherapy with repeat imaging to follow    However d/t current clinical situation  will hold chemo today; pt scheduled for PET scan tomorrow  Plan for f/u Tuesday with MD for PET review and possible Mvasi/doxorubicin            Chemotherapy-induced peripheral neuropathy     Other Visit Diagnoses       Malignant neoplasm of right ovary                     Med Onc Chart Routing      Follow up with physician Other. Next tuesday 10/15 with chemo--VV okay at pt prefers [facility]--using labs from 10/10   Follow up with [facility]    Infusion scheduling note   Doxi/Mvasi   Injection scheduling note    Labs    Imaging    Pharmacy appointment    Other referrals                   LORAINE OrtegaP-C  Hematology/Oncology

## 2024-10-11 ENCOUNTER — HOSPITAL ENCOUNTER (OUTPATIENT)
Dept: RADIOLOGY | Facility: HOSPITAL | Age: 69
Discharge: HOME | End: 2024-10-11
Attending: INTERNAL MEDICINE
Payer: MEDICARE

## 2024-10-11 ENCOUNTER — INFUSION (OUTPATIENT)
Dept: INFUSION THERAPY | Facility: HOSPITAL | Age: 69
End: 2024-10-11
Attending: INTERNAL MEDICINE
Payer: MEDICARE

## 2024-10-11 DIAGNOSIS — C56.3 OVARIAN CANCER, BILATERAL: ICD-10-CM

## 2024-10-11 DIAGNOSIS — C56.3 OVARIAN CANCER, BILATERAL: Primary | ICD-10-CM

## 2024-10-11 DIAGNOSIS — C78.6 PERITONEAL CARCINOMATOSIS: ICD-10-CM

## 2024-10-11 PROCEDURE — 96523 IRRIG DRUG DELIVERY DEVICE: CPT | Mod: HCNC

## 2024-10-11 PROCEDURE — A9552 F18 FDG: HCPCS | Mod: HCNC | Performed by: INTERNAL MEDICINE

## 2024-10-11 PROCEDURE — 96375 TX/PRO/DX INJ NEW DRUG ADDON: CPT | Mod: HCNC

## 2024-10-11 PROCEDURE — 78815 PET IMAGE W/CT SKULL-THIGH: CPT | Mod: TC,HCNC

## 2024-10-11 PROCEDURE — 25000003 PHARM REV CODE 250: Mod: HCNC | Performed by: INTERNAL MEDICINE

## 2024-10-11 PROCEDURE — A4216 STERILE WATER/SALINE, 10 ML: HCPCS | Mod: HCNC | Performed by: INTERNAL MEDICINE

## 2024-10-11 PROCEDURE — 78815 PET IMAGE W/CT SKULL-THIGH: CPT | Mod: 26,HCNC,PS, | Performed by: RADIOLOGY

## 2024-10-11 PROCEDURE — 63600175 PHARM REV CODE 636 W HCPCS: Mod: HCNC | Performed by: INTERNAL MEDICINE

## 2024-10-11 RX ORDER — SODIUM CHLORIDE 0.9 % (FLUSH) 0.9 %
10 SYRINGE (ML) INJECTION
OUTPATIENT
Start: 2024-10-11

## 2024-10-11 RX ORDER — SODIUM CHLORIDE 0.9 % (FLUSH) 0.9 %
10 SYRINGE (ML) INJECTION
Status: DISCONTINUED | OUTPATIENT
Start: 2024-10-11 | End: 2024-10-11 | Stop reason: HOSPADM

## 2024-10-11 RX ORDER — HEPARIN 100 UNIT/ML
500 SYRINGE INTRAVENOUS
Status: DISCONTINUED | OUTPATIENT
Start: 2024-10-11 | End: 2024-10-11 | Stop reason: HOSPADM

## 2024-10-11 RX ORDER — FLUDEOXYGLUCOSE F18 500 MCI/ML
10.79 INJECTION INTRAVENOUS
Status: COMPLETED | OUTPATIENT
Start: 2024-10-11 | End: 2024-10-11

## 2024-10-11 RX ORDER — HEPARIN 100 UNIT/ML
500 SYRINGE INTRAVENOUS
OUTPATIENT
Start: 2024-10-11

## 2024-10-11 RX ADMIN — FLUDEOXYGLUCOSE F-18 10.79 MILLICURIE: 500 INJECTION INTRAVENOUS at 01:10

## 2024-10-11 RX ADMIN — HEPARIN 500 UNITS: 100 SYRINGE at 03:10

## 2024-10-11 RX ADMIN — ALTEPLASE 2 MG: 2.2 INJECTION, POWDER, LYOPHILIZED, FOR SOLUTION INTRAVENOUS at 01:10

## 2024-10-11 RX ADMIN — SODIUM CHLORIDE, PRESERVATIVE FREE 10 ML: 5 INJECTION INTRAVENOUS at 03:10

## 2024-10-11 NOTE — NURSING
".Pt here for Mediport Flush. Left chestwall mediport accessed with a 20g 1" rodriguez via sterile technique.  No blood return noted. Used Cathflo. Started IV for PET Scan. When returned to infusion, blood return present. Flushed with 10ml NS and 5 ml heparin solution.  Needle D/C, site without redness, swelling, or drainage noted.  Dressing applied.  Patient tolerated well.    "

## 2024-10-12 NOTE — ASSESSMENT & PLAN NOTE
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 Patient needs tissue sampling of liver lesion. Recommend colonoscopy and possible biopsy of sigmoid lesion.    Case discussed with primary oncologist plan for 3 additional cycles of chemotherapy with repeat imaging to follow    However d/t current clinical situation will hold chemo today; pt scheduled for PET scan tomorrow  Plan for f/u Tuesday with MD for PET review and possible Mvasi/doxorubicin

## 2024-10-14 PROBLEM — N18.31 CHRONIC KIDNEY DISEASE, STAGE 3A: Status: ACTIVE | Noted: 2024-10-14

## 2024-10-15 ENCOUNTER — PATIENT MESSAGE (OUTPATIENT)
Dept: HEMATOLOGY/ONCOLOGY | Facility: CLINIC | Age: 69
End: 2024-10-15

## 2024-10-15 ENCOUNTER — INFUSION (OUTPATIENT)
Dept: INFUSION THERAPY | Facility: HOSPITAL | Age: 69
End: 2024-10-15
Attending: INTERNAL MEDICINE
Payer: MEDICARE

## 2024-10-15 ENCOUNTER — OFFICE VISIT (OUTPATIENT)
Dept: HEMATOLOGY/ONCOLOGY | Facility: CLINIC | Age: 69
End: 2024-10-15
Payer: MEDICARE

## 2024-10-15 ENCOUNTER — DOCUMENTATION ONLY (OUTPATIENT)
Dept: HEMATOLOGY/ONCOLOGY | Facility: CLINIC | Age: 69
End: 2024-10-15
Payer: MEDICARE

## 2024-10-15 ENCOUNTER — HOSPITAL ENCOUNTER (OUTPATIENT)
Dept: RADIOLOGY | Facility: HOSPITAL | Age: 69
Discharge: HOME | End: 2024-10-15
Attending: INTERNAL MEDICINE
Payer: MEDICARE

## 2024-10-15 VITALS
WEIGHT: 235.44 LBS | SYSTOLIC BLOOD PRESSURE: 106 MMHG | TEMPERATURE: 98 F | HEART RATE: 74 BPM | RESPIRATION RATE: 20 BRPM | BODY MASS INDEX: 36.95 KG/M2 | OXYGEN SATURATION: 94 % | HEIGHT: 67 IN | DIASTOLIC BLOOD PRESSURE: 58 MMHG

## 2024-10-15 DIAGNOSIS — R11.0 NAUSEA: ICD-10-CM

## 2024-10-15 DIAGNOSIS — C78.6 PERITONEAL CARCINOMATOSIS: ICD-10-CM

## 2024-10-15 DIAGNOSIS — M79.89 LEFT LEG SWELLING: ICD-10-CM

## 2024-10-15 DIAGNOSIS — C56.3 OVARIAN CANCER, BILATERAL: Primary | ICD-10-CM

## 2024-10-15 DIAGNOSIS — R60.0 LOCALIZED EDEMA: ICD-10-CM

## 2024-10-15 DIAGNOSIS — Z51.11 ENCOUNTER FOR ANTINEOPLASTIC CHEMOTHERAPY: ICD-10-CM

## 2024-10-15 DIAGNOSIS — C56.3 MALIGNANT NEOPLASM OF BOTH OVARIES: ICD-10-CM

## 2024-10-15 DIAGNOSIS — C78.6 PERITONEAL CARCINOMATOSIS: Primary | ICD-10-CM

## 2024-10-15 PROCEDURE — 93971 EXTREMITY STUDY: CPT | Mod: TC,HCNC,LT

## 2024-10-15 PROCEDURE — 93971 EXTREMITY STUDY: CPT | Mod: 26,HCNC,LT, | Performed by: RADIOLOGY

## 2024-10-15 PROCEDURE — 25000003 PHARM REV CODE 250: Mod: HCNC | Performed by: INTERNAL MEDICINE

## 2024-10-15 PROCEDURE — 96413 CHEMO IV INFUSION 1 HR: CPT | Mod: HCNC

## 2024-10-15 PROCEDURE — 96375 TX/PRO/DX INJ NEW DRUG ADDON: CPT | Mod: HCNC

## 2024-10-15 PROCEDURE — 63600175 PHARM REV CODE 636 W HCPCS: Mod: HCNC | Performed by: INTERNAL MEDICINE

## 2024-10-15 PROCEDURE — 96361 HYDRATE IV INFUSION ADD-ON: CPT | Mod: HCNC

## 2024-10-15 RX ORDER — ONDANSETRON HYDROCHLORIDE 2 MG/ML
8 INJECTION, SOLUTION INTRAVENOUS
Status: CANCELLED | OUTPATIENT
Start: 2024-10-15

## 2024-10-15 RX ORDER — ONDANSETRON HYDROCHLORIDE 2 MG/ML
8 INJECTION, SOLUTION INTRAVENOUS
Status: COMPLETED | OUTPATIENT
Start: 2024-10-15 | End: 2024-10-15

## 2024-10-15 RX ORDER — PROCHLORPERAZINE EDISYLATE 5 MG/ML
5 INJECTION INTRAMUSCULAR; INTRAVENOUS ONCE AS NEEDED
Status: CANCELLED | OUTPATIENT
Start: 2024-10-15

## 2024-10-15 RX ORDER — DIPHENHYDRAMINE HYDROCHLORIDE 50 MG/ML
50 INJECTION INTRAMUSCULAR; INTRAVENOUS ONCE AS NEEDED
Status: CANCELLED | OUTPATIENT
Start: 2024-10-15

## 2024-10-15 RX ORDER — ONDANSETRON 8 MG/1
8 TABLET, ORALLY DISINTEGRATING ORAL EVERY 8 HOURS
Qty: 90 TABLET | Refills: 5 | Status: SHIPPED | OUTPATIENT
Start: 2024-10-15

## 2024-10-15 RX ORDER — EPINEPHRINE 0.3 MG/.3ML
0.3 INJECTION SUBCUTANEOUS ONCE AS NEEDED
Status: CANCELLED | OUTPATIENT
Start: 2024-10-15

## 2024-10-15 RX ORDER — HEPARIN 100 UNIT/ML
500 SYRINGE INTRAVENOUS
Status: CANCELLED | OUTPATIENT
Start: 2024-10-15

## 2024-10-15 RX ORDER — SODIUM CHLORIDE 0.9 % (FLUSH) 0.9 %
10 SYRINGE (ML) INJECTION
Status: CANCELLED | OUTPATIENT
Start: 2024-10-15

## 2024-10-15 RX ORDER — HEPARIN 100 UNIT/ML
500 SYRINGE INTRAVENOUS
Status: DISCONTINUED | OUTPATIENT
Start: 2024-10-15 | End: 2024-10-15 | Stop reason: HOSPADM

## 2024-10-15 RX ADMIN — HEPARIN 500 UNITS: 100 SYRINGE at 03:10

## 2024-10-15 RX ADMIN — SODIUM CHLORIDE 1000 ML: 9 INJECTION, SOLUTION INTRAVENOUS at 01:10

## 2024-10-15 RX ADMIN — DOXORUBICIN HYDROCHLORIDE 90 MG: 2 INJECTABLE, LIPOSOMAL INTRAVENOUS at 02:10

## 2024-10-15 RX ADMIN — ONDANSETRON 8 MG: 2 INJECTION INTRAMUSCULAR; INTRAVENOUS at 01:10

## 2024-10-15 NOTE — PROGRESS NOTES
VU met with pt and spouse, and provided the [facility] aid and attendance forms, completed and signed by provider SW explained what sections pt/spouse needed to complete/sign. Pt stated that her home O2 tanks have been empty. SW advised pt to call DME provider. Pt stated that she has contact info on sticker on tank, and will call provider to request a POC. Distress score r/t to loss of [place] and this is why pt/spouse are applying for additional in-home assistance from the [facility]. No other needs expressed. SW will remain available.

## 2024-10-15 NOTE — PROGRESS NOTES
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  - Right ureteral obstruction with indwelling ureteral stent     Previous Treatment:    - 02/2019 - 07/2019: Carboplatin, paclitaxel and Avastin x 6 cycles  - 08/15/2019:  salpingo-oophorectomy, ureterolysis/Omentectomy with complete pathologic response  - 10/2019 - 9/2020 Avastin maintenance   - Carboplatin and Paclitaxel (09/29/2023 - 02/14/2024)    Current Treatment:  Doxorubicin and Bevacizumab (09/06/24 - )    The patient location is: [place], [place]  The chief complaint leading to consultation is: Follow up    Visit type: audiovisual    Face to Face time with patient: 15  30 minutes of total time spent on the encounter, which includes face to face time and non-face to face time preparing to see the patient (eg, review of tests), Obtaining and/or reviewing separately obtained history, Documenting clinical information in the electronic or other health record, Independently interpreting results (not separately reported) and communicating results to the patient/family/caregiver, or Care coordination (not separately reported).     Each patient to whom he or she provides medical services by telemedicine is:  (1) informed of the relationship between the physician and patient and the respective role of any other health care provider with respect to management of the patient; and (2) notified that he or she may decline to receive medical services by telemedicine and may withdraw from such care at any time.    Notes:   Subjective   Casie Gaines is a pleasant 68 y.o. female who presents to clinic for follow up.    Unfortunately Ms. Gaines has been hospitalized for infectious treatment for PNA.  Her chemotherapy has been delayed and on imaging obtained in the hospital she has progressed.  I discussed with her that the imaging does  show POD and that the malignancy is spreading.  She has only had 1C of chemotherapy and I recommend we continue chemotherapy with short interval repeat imaging.    She reports that currently she has sharp pains on the right and left sides of her thorax that is intermittent and short lived; she is having a little difficulty urinating as if her bladder is coming out so she feels like she has to eliminate quickly so that the bladder muscle can relax. She is urinating less since hospital discharge and she has sweeling in her left leg mnore than the right. She really has no acute complaint and overall is improving from a PNA standpoint.  She continues to require supplemental oxygen but is weaning down.    We had a gee discussion about the aggressive nature of this malignancy.  I offered to have a family meeting and she declines for now but will consider it.  She is in agreement with clinical trials screen.    Review of Systems   Constitutional:  Positive for diaphoresis and fatigue. Negative for activity change, appetite change, chills, fever and unexpected weight change.   HENT:  Positive for congestion and trouble swallowing. Negative for nosebleeds.    Respiratory:  Negative for shortness of breath.    Gastrointestinal:  Positive for abdominal distention and nausea. Negative for constipation, diarrhea and vomiting.   Musculoskeletal:  Negative for back pain.   Skin:  Negative for rash.   Neurological:  Positive for numbness. Negative for dizziness, weakness, light-headedness and headaches.   Hematological:  Does not bruise/bleed easily.   Psychiatric/Behavioral:  The patient is not nervous/anxious.      History     Oncology History   Peritoneal carcinomatosis   1/26/2019 Initial Diagnosis    Peritoneal carcinomatosis     2/15/2019 - 7/8/2019 Chemotherapy    Treatment Summary   Plan Name: OP GYN PACLITAXEL CARBOPLATIN (AUC 6) Q3W  Treatment Goal: Palliative  Status: Inactive  Start Date: 2/28/2019  End Date:  6/18/2019  Provider: Will Trevizo Jr., MD  Chemotherapy: bevacizumab (AVASTIN) 15 mg/kg = 1,600 mg in sodium chloride 0.9% 100 mL chemo infusion, 15 mg/kg = 1,600 mg (100 % of original dose 15 mg/kg), Intravenous, Clinic/[facility] 1 time, 6 of 6 cycles  Dose modification: 15 mg/kg (original dose 15 mg/kg, Cycle 1)  Administration: 1,600 mg (2/28/2019), 1,600 mg (3/21/2019), 1,600 mg (4/11/2019), 1,600 mg (5/7/2019), 1,600 mg (5/28/2019), 1,510 mg (6/18/2019)  CARBOplatin (PARAPLATIN) 870 mg in sodium chloride 0.9% 337 mL chemo infusion, 870 mg (100 % of original dose 868.8 mg), Intravenous, Clinic/[facility] 1 time, 6 of 6 cycles  Dose modification:   (original dose 868.8 mg, Cycle 1)  Administration: 870 mg (2/28/2019), 870 mg (3/21/2019), 900 mg (4/11/2019), 870 mg (5/7/2019), 660 mg (5/28/2019), 690 mg (6/18/2019)  PACLitaxel (TAXOL) 175 mg/m2 = 396 mg in sodium chloride 0.9% 566 mL chemo infusion, 175 mg/m2 = 396 mg, Intravenous, Clinic/[facility] 1 time, 6 of 6 cycles  Dose modification: 140 mg/m2 (80 % of original dose 175 mg/m2, Cycle 5)  Administration: 396 mg (2/28/2019), 396 mg (3/21/2019), 396 mg (4/11/2019), 396 mg (5/7/2019), 318 mg (5/28/2019), 306 mg (6/18/2019)     10/9/2019 - 9/24/2020 Chemotherapy    Treatment Summary   Plan Name: OP BEVACIZUMAB Q3W   Treatment Goal: Maintenance  Status: Inactive  Start Date: 10/9/2019  End Date: 9/24/2020  Provider: Oscar Mckeon MD  Chemotherapy: dexAMETHasone tablet 8 mg, 8 mg (100 % of original dose 8 mg), Oral, Clinic/[facility] 1 time, 2 of 8 cycles  Dose modification: 8 mg (original dose 8 mg, Cycle 8), 8 mg (original dose 8 mg, Cycle 12)  Administration: 8 mg (7/22/2020), 8 mg (8/13/2020)  bevacizumab (AVASTIN) 15 mg/kg = 1,615 mg in sodium chloride 0.9% 100 mL chemo infusion, 15 mg/kg = 1,615 mg, Intravenous, Clinic/[facility] 1 time, 11 of 17 cycles  Administration: 1,615 mg (10/9/2019), 1,615 mg (10/29/2019), 1,615 mg (12/10/2019), 1,615 mg (1/21/2020), 1,600 mg (4/2/2020), 1,615 mg  (4/23/2020), 1,600 mg (7/1/2020), 1,600 mg (7/22/2020), 1,600 mg (8/13/2020), 1,795 mg (9/3/2020), 1,795 mg (9/24/2020)     9/29/2023 - 2/14/2024 Chemotherapy    Treatment Summary   Plan Name: OP GYN PACLITAXEL CARBOPLATIN desensitization (AUC 5) Q3W  Treatment Goal: Control  Status: Inactive  Start Date: 9/29/2023  End Date: 2/14/2024  Provider: Ismael Juarez MD  Chemotherapy: CARBOplatin (PARAPLATIN) 650 mg in sodium chloride 0.9% 335 mL chemo infusion, 650 mg (100 % of original dose 648 mg), Intravenous, Clinic/[facility] 1 time, 6 of 17 cycles  Dose modification:   (original dose 648 mg, Cycle 1),   (original dose 853.8 mg, Cycle 2),   (original dose 730 mg, Cycle 3),   (original dose 730 mg, Cycle 4), 5 mg (original dose 730 mg, Cycle 4), 7.3 mg (original dose 730 mg, Cycle 4), 5 mg (original dose 730 mg, Cycle 4, Reason: MD Discretion, Comment: desensitization), 73 mg (original dose 730 mg, Cycle 4), 720 mg (original dose 730 mg, Cycle 4, Reason: MD Discretion, Comment: desensitization), 648.97 mg (original dose 730 mg, Cycle 4, Reason: MD Discretion, Comment: desensitization), 0.61 mg (original dose 730 mg, Cycle 5, Reason: MD Discretion, Comment: desensitization), 5 mg (original dose 730 mg, Cycle 5, Reason: MD Discretion, Comment: desens), 60.5 mg (original dose 730 mg, Cycle 5, Reason: MD Discretion, Comment: desensitization),   (original dose 730 mg, Cycle 5, Reason: MD Discretion, Comment: new scr), 6.05 mg (original dose 730 mg, Cycle 5, Reason: Dose not tolerated, Comment: desensitization)  Administration: 650 mg (9/29/2023), 730 mg (11/10/2023), 710 mg (10/20/2023), 5 mg (1/3/2024), 5 mg (1/3/2024), 75 mg (1/3/2024), 650 mg (1/3/2024), 5 mg (2/14/2024), 5 mg (2/14/2024), 75 mg (2/14/2024), 650 mg (2/14/2024), 5 mg (1/24/2024), 60 mg (1/24/2024), 605 mg (1/24/2024), 5 mg (1/24/2024)  PACLitaxeL (TAXOL) 175 mg/m2 = 402 mg in sodium chloride 0.9% 500 mL chemo infusion, 175 mg/m2 = 402 mg, Intravenous,  Clinic/[facility] 1 time, 6 of 17 cycles  Administration: 402 mg (9/29/2023), 402 mg (10/20/2023), 408 mg (11/10/2023), 408 mg (1/3/2024), 408 mg (1/24/2024), 408 mg (2/14/2024)     9/6/2024 -  Chemotherapy    Treatment Summary   Plan Name: OP GYN bevacizumab liposomal DOXOrubicin Q4W  Treatment Goal: Control  Status: Active  Start Date: 9/6/2024  End Date: 8/5/2025 (Planned)  Provider: Torri House MD  Chemotherapy: DOXOrubicin liposome (DOXIL) 92 mg in D5W 611 mL chemo infusion, 40 mg/m2 = 92 mg, Intravenous, Clinic/[facility] 1 time, 2 of 12 cycles  Administration: 90 mg (10/15/2024), 92 mg (9/6/2024)  bevacizumab-awwb (MVASI) 1,100 mg in 0.9% NaCl 100 mL infusion, 1,125 mg, Intravenous, Clinic/[facility] 1 time, 2 of 12 cycles  Administration: 1,100 mg (9/20/2024), 1,100 mg (9/6/2024)      Chemotherapy    Treatment Summary   Plan Name: OP GYN PACLITAXEL CARBOPLATIN (AUC 6) Q3W  Treatment Goal: Palliative  Status: Inactive  Start Date: 2/28/2019  End Date: 6/18/2019  Provider: Will Trevizo Jr., MD  Chemotherapy: bevacizumab (AVASTIN) 15 mg/kg = 1,600 mg in sodium chloride 0.9% 100 mL chemo infusion, 15 mg/kg = 1,600 mg (100 % of original dose 15 mg/kg), Intravenous, Clinic/[facility] 1 time, 6 of 6 cycles  Dose modification: 15 mg/kg (original dose 15 mg/kg, Cycle 1)  Administration: 1,600 mg (2/28/2019), 1,600 mg (3/21/2019), 1,600 mg (4/11/2019), 1,600 mg (5/7/2019), 1,600 mg (5/28/2019), 1,510 mg (6/18/2019)    CARBOplatin (PARAPLATIN) 870 mg in sodium chloride 0.9% 337 mL chemo infusion, 870 mg (100 % of original dose 868.8 mg), Intravenous, Clinic/[facility] 1 time, 6 of 6 cycles  Dose modification:   (original dose 868.8 mg, Cycle 1)  Administration: 870 mg (2/28/2019), 870 mg (3/21/2019), 900 mg (4/11/2019), 870 mg (5/7/2019), 660 mg (5/28/2019), 690 mg (6/18/2019)    PACLitaxel (TAXOL) 175 mg/m2 = 396 mg in sodium chloride 0.9% 566 mL chemo infusion, 175 mg/m2 = 396 mg, Intravenous, Clinic/[facility] 1 time, 6 of 6 cycles  Dose  modification: 140 mg/m2 (80 % of original dose 175 mg/m2, Cycle 5)  Administration: 396 mg (2/28/2019), 396 mg (3/21/2019), 396 mg (4/11/2019), 396 mg (5/7/2019), 318 mg (5/28/2019), 306 mg (6/18/2019)    Plan Name: OP BEVACIZUMAB Q3W   Treatment Goal: Maintenance  Status: Inactive  Start Date: 10/9/2019  End Date: 9/24/2020  Provider: Oscar Mckeon MD  Chemotherapy: bevacizumab (AVASTIN) 15 mg/kg = 1,615 mg in sodium chloride 0.9% 100 mL chemo infusion, 15 mg/kg = 1,615 mg, Intravenous, Clinic/[facility] 1 time, 11 of 17 cycles  Administration: 1,615 mg (10/9/2019), 1,615 mg (10/29/2019), 1,615 mg (12/10/2019), 1,615 mg (1/21/2020), 1,600 mg (4/2/2020), 1,615 mg (4/23/2020), 1,600 mg (7/1/2020), 1,600 mg (7/22/2020), 1,600 mg (8/13/2020), 1,795 mg (9/3/2020), 1,795 mg (9/24/2020)    Plan Name: OP GYN PACLITAXEL CARBOPLATIN desensitization (AUC 5) Q3W  Treatment Goal: Control  Status: Inactive  Start Date: 9/29/2023  End Date: 2/14/2024  Provider: Ismael Juarez MD  Chemotherapy: CARBOplatin (PARAPLATIN) 650 mg in sodium chloride 0.9% 335 mL chemo infusion, 650 mg (100 % of original dose 648 mg), Intravenous, Clinic/[facility] 1 time, 6 of 17 cycles  Dose modification:   (original dose 648 mg, Cycle 1),   (original dose 853.8 mg, Cycle 2),   (original dose 730 mg, Cycle 3),   (original dose 730 mg, Cycle 4), 5 mg (original dose 730 mg, Cycle 4), 7.3 mg (original dose 730 mg, Cycle 4), 5 mg (original dose 730 mg, Cycle 4, Reason: MD Discretion, Comment: desensitization), 73 mg (original dose 730 mg, Cycle 4), 720 mg (original dose 730 mg, Cycle 4, Reason: MD Discretion, Comment: desensitization), 648.97 mg (original dose 730 mg, Cycle 4, Reason: MD Discretion, Comment: desensitization), 0.61 mg (original dose 730 mg, Cycle 5, Reason: MD Discretion, Comment: desensitization), 5 mg (original dose 730 mg, Cycle 5, Reason: MD Discretion, Comment: desens), 60.5 mg (original dose 730 mg, Cycle 5, Reason: MD Discretion, Comment:  desensitization),   (original dose 730 mg, Cycle 5, Reason: MD Discretion, Comment: new scr), 6.05 mg (original dose 730 mg, Cycle 5, Reason: Dose not tolerated, Comment: desensitization)  Administration: 650 mg (9/29/2023), 730 mg (11/10/2023), 710 mg (10/20/2023), 5 mg (1/3/2024), 5 mg (1/3/2024), 75 mg (1/3/2024), 650 mg (1/3/2024), 5 mg (2/14/2024), 5 mg (2/14/2024), 75 mg (2/14/2024), 650 mg (2/14/2024), 5 mg (1/24/2024), 60 mg (1/24/2024), 605 mg (1/24/2024), 5 mg (1/24/2024)    PACLitaxeL (TAXOL) 175 mg/m2 = 402 mg in sodium chloride 0.9% 500 mL chemo infusion, 175 mg/m2 = 402 mg, Intravenous, Clinic/[facility] 1 time, 6 of 17 cycles  Administration: 402 mg (9/29/2023), 402 mg (10/20/2023), 408 mg (11/10/2023), 408 mg (1/3/2024), 408 mg (1/24/2024), 408 mg (2/14/2024)    Plan Name: OP GYN bevacizumab liposomal DOXOrubicin Q4W  Treatment Goal: Control  Status: Active  Start Date: 9/3/2024 (Planned)  End Date: 7/22/2025 (Planned)  Provider: Torri House MD  Chemotherapy: DOXOrubicin liposome (DOXIL) 92 mg in D5W 296 mL chemo infusion, 40 mg/m2 = 92 mg, Intravenous, Clinic/[facility] 1 time, 0 of 12 cycles    bevacizumab-awwb (MVASI) 10 mg/kg = 1,125 mg in 0.9% NaCl 100 mL infusion, 10 mg/kg = 1,125 mg, Intravenous, Clinic/[facility] 1 time, 0 of 12 cycles       Malignant neoplasm of right ovary (Resolved)   2/15/2019 Initial Diagnosis    Malignant neoplasm of right ovary     2/15/2019 - 7/8/2019 Chemotherapy    Treatment Summary   Plan Name: OP GYN PACLITAXEL CARBOPLATIN (AUC 6) Q3W  Treatment Goal: Palliative  Status: Inactive  Start Date: 2/28/2019  End Date: 6/18/2019  Provider: Will Trevizo Jr., MD  Chemotherapy: bevacizumab (AVASTIN) 15 mg/kg = 1,600 mg in sodium chloride 0.9% 100 mL chemo infusion, 15 mg/kg = 1,600 mg (100 % of original dose 15 mg/kg), Intravenous, Clinic/[facility] 1 time, 6 of 6 cycles  Dose modification: 15 mg/kg (original dose 15 mg/kg, Cycle 1)  Administration: 1,600 mg (2/28/2019), 1,600 mg  (3/21/2019), 1,600 mg (4/11/2019), 1,600 mg (5/7/2019), 1,600 mg (5/28/2019), 1,510 mg (6/18/2019)  CARBOplatin (PARAPLATIN) 870 mg in sodium chloride 0.9% 337 mL chemo infusion, 870 mg (100 % of original dose 868.8 mg), Intravenous, Clinic/[facility] 1 time, 6 of 6 cycles  Dose modification:   (original dose 868.8 mg, Cycle 1)  Administration: 870 mg (2/28/2019), 870 mg (3/21/2019), 900 mg (4/11/2019), 870 mg (5/7/2019), 660 mg (5/28/2019), 690 mg (6/18/2019)  PACLitaxel (TAXOL) 175 mg/m2 = 396 mg in sodium chloride 0.9% 566 mL chemo infusion, 175 mg/m2 = 396 mg, Intravenous, Clinic/[facility] 1 time, 6 of 6 cycles  Dose modification: 140 mg/m2 (80 % of original dose 175 mg/m2, Cycle 5)  Administration: 396 mg (2/28/2019), 396 mg (3/21/2019), 396 mg (4/11/2019), 396 mg (5/7/2019), 318 mg (5/28/2019), 306 mg (6/18/2019)     10/9/2019 - 9/24/2020 Chemotherapy    Treatment Summary   Plan Name: OP BEVACIZUMAB Q3W   Treatment Goal: Maintenance  Status: Inactive  Start Date: 10/9/2019  End Date: 9/24/2020  Provider: Oscar Mckeon MD  Chemotherapy: dexAMETHasone tablet 8 mg, 8 mg (100 % of original dose 8 mg), Oral, Clinic/[facility] 1 time, 2 of 8 cycles  Dose modification: 8 mg (original dose 8 mg, Cycle 8), 8 mg (original dose 8 mg, Cycle 12)  Administration: 8 mg (7/22/2020), 8 mg (8/13/2020)  bevacizumab (AVASTIN) 15 mg/kg = 1,615 mg in sodium chloride 0.9% 100 mL chemo infusion, 15 mg/kg = 1,615 mg, Intravenous, Clinic/[facility] 1 time, 11 of 17 cycles  Administration: 1,615 mg (10/9/2019), 1,615 mg (10/29/2019), 1,615 mg (12/10/2019), 1,615 mg (1/21/2020), 1,600 mg (4/2/2020), 1,615 mg (4/23/2020), 1,600 mg (7/1/2020), 1,600 mg (7/22/2020), 1,600 mg (8/13/2020), 1,795 mg (9/3/2020), 1,795 mg (9/24/2020)      Chemotherapy    Treatment Summary   Plan Name: OP GYN PACLITAXEL CARBOPLATIN (AUC 6) Q3W  Treatment Goal: Palliative  Status: Inactive  Start Date: 2/28/2019  End Date: 6/18/2019  Provider: Will Trevizo Jr., MD  Chemotherapy:  bevacizumab (AVASTIN) 15 mg/kg = 1,600 mg in sodium chloride 0.9% 100 mL chemo infusion, 15 mg/kg = 1,600 mg (100 % of original dose 15 mg/kg), Intravenous, Clinic/[facility] 1 time, 6 of 6 cycles  Dose modification: 15 mg/kg (original dose 15 mg/kg, Cycle 1)  Administration: 1,600 mg (2/28/2019), 1,600 mg (3/21/2019), 1,600 mg (4/11/2019), 1,600 mg (5/7/2019), 1,600 mg (5/28/2019), 1,510 mg (6/18/2019)    CARBOplatin (PARAPLATIN) 870 mg in sodium chloride 0.9% 337 mL chemo infusion, 870 mg (100 % of original dose 868.8 mg), Intravenous, Clinic/[facility] 1 time, 6 of 6 cycles  Dose modification:   (original dose 868.8 mg, Cycle 1)  Administration: 870 mg (2/28/2019), 870 mg (3/21/2019), 900 mg (4/11/2019), 870 mg (5/7/2019), 660 mg (5/28/2019), 690 mg (6/18/2019)    PACLitaxel (TAXOL) 175 mg/m2 = 396 mg in sodium chloride 0.9% 566 mL chemo infusion, 175 mg/m2 = 396 mg, Intravenous, Clinic/[facility] 1 time, 6 of 6 cycles  Dose modification: 140 mg/m2 (80 % of original dose 175 mg/m2, Cycle 5)  Administration: 396 mg (2/28/2019), 396 mg (3/21/2019), 396 mg (4/11/2019), 396 mg (5/7/2019), 318 mg (5/28/2019), 306 mg (6/18/2019)    Plan Name: OP BEVACIZUMAB Q3W   Treatment Goal: Maintenance  Status: Inactive  Start Date: 10/9/2019  End Date: 9/24/2020  Provider: Oscar Mckeon MD  Chemotherapy: bevacizumab (AVASTIN) 15 mg/kg = 1,615 mg in sodium chloride 0.9% 100 mL chemo infusion, 15 mg/kg = 1,615 mg, Intravenous, Clinic/[facility] 1 time, 11 of 17 cycles  Administration: 1,615 mg (10/9/2019), 1,615 mg (10/29/2019), 1,615 mg (12/10/2019), 1,615 mg (1/21/2020), 1,600 mg (4/2/2020), 1,615 mg (4/23/2020), 1,600 mg (7/1/2020), 1,600 mg (7/22/2020), 1,600 mg (8/13/2020), 1,795 mg (9/3/2020), 1,795 mg (9/24/2020)    Plan Name: OP GYN PACLITAXEL CARBOPLATIN desensitization (AUC 5) Q3W  Treatment Goal: Control  Status: Inactive  Start Date: 9/29/2023  End Date: 2/14/2024  Provider: Ismael Juarez MD  Chemotherapy: CARBOplatin (PARAPLATIN) 650 mg in  sodium chloride 0.9% 335 mL chemo infusion, 650 mg (100 % of original dose 648 mg), Intravenous, Clinic/[facility] 1 time, 6 of 17 cycles  Dose modification:   (original dose 648 mg, Cycle 1),   (original dose 853.8 mg, Cycle 2),   (original dose 730 mg, Cycle 3),   (original dose 730 mg, Cycle 4), 5 mg (original dose 730 mg, Cycle 4), 7.3 mg (original dose 730 mg, Cycle 4), 5 mg (original dose 730 mg, Cycle 4, Reason: MD Discretion, Comment: desensitization), 73 mg (original dose 730 mg, Cycle 4), 720 mg (original dose 730 mg, Cycle 4, Reason: MD Discretion, Comment: desensitization), 648.97 mg (original dose 730 mg, Cycle 4, Reason: MD Discretion, Comment: desensitization), 0.61 mg (original dose 730 mg, Cycle 5, Reason: MD Discretion, Comment: desensitization), 5 mg (original dose 730 mg, Cycle 5, Reason: MD Discretion, Comment: desens), 60.5 mg (original dose 730 mg, Cycle 5, Reason: MD Discretion, Comment: desensitization),   (original dose 730 mg, Cycle 5, Reason: MD Discretion, Comment: new scr), 6.05 mg (original dose 730 mg, Cycle 5, Reason: Dose not tolerated, Comment: desensitization)  Administration: 650 mg (9/29/2023), 730 mg (11/10/2023), 710 mg (10/20/2023), 5 mg (1/3/2024), 5 mg (1/3/2024), 75 mg (1/3/2024), 650 mg (1/3/2024), 5 mg (2/14/2024), 5 mg (2/14/2024), 75 mg (2/14/2024), 650 mg (2/14/2024), 5 mg (1/24/2024), 60 mg (1/24/2024), 605 mg (1/24/2024), 5 mg (1/24/2024)    PACLitaxeL (TAXOL) 175 mg/m2 = 402 mg in sodium chloride 0.9% 500 mL chemo infusion, 175 mg/m2 = 402 mg, Intravenous, Clinic/[facility] 1 time, 6 of 17 cycles  Administration: 402 mg (9/29/2023), 402 mg (10/20/2023), 408 mg (11/10/2023), 408 mg (1/3/2024), 408 mg (1/24/2024), 408 mg (2/14/2024)    Plan Name: OP GYN bevacizumab liposomal DOXOrubicin Q4W  Treatment Goal: Control  Status: Active  Start Date: 9/3/2024 (Planned)  End Date: 7/22/2025 (Planned)  Provider: Torri House MD  Chemotherapy: DOXOrubicin liposome (DOXIL) 92 mg in D5W  296 mL chemo infusion, 40 mg/m2 = 92 mg, Intravenous, Clinic/[facility] 1 time, 0 of 12 cycles    bevacizumab-awwb (MVASI) 10 mg/kg = 1,125 mg in 0.9% NaCl 100 mL infusion, 10 mg/kg = 1,125 mg, Intravenous, Clinic/[facility] 1 time, 0 of 12 cycles       Malignant neoplasm of both ovaries (Resolved)   2/18/2019 Initial Diagnosis    Ovarian cancer     10/9/2019 - 9/24/2020 Chemotherapy    Treatment Summary   Plan Name: OP BEVACIZUMAB Q3W   Treatment Goal: Maintenance  Status: Inactive  Start Date: 10/9/2019  End Date: 9/24/2020  Provider: Oscar Mckeon MD  Chemotherapy: dexAMETHasone tablet 8 mg, 8 mg (100 % of original dose 8 mg), Oral, Clinic/[facility] 1 time, 2 of 8 cycles  Dose modification: 8 mg (original dose 8 mg, Cycle 8), 8 mg (original dose 8 mg, Cycle 12)  Administration: 8 mg (7/22/2020), 8 mg (8/13/2020)  bevacizumab (AVASTIN) 15 mg/kg = 1,615 mg in sodium chloride 0.9% 100 mL chemo infusion, 15 mg/kg = 1,615 mg, Intravenous, Clinic/H[facility] 1 time, 11 of 17 cycles  Administration: 1,615 mg (10/9/2019), 1,615 mg (10/29/2019), 1,615 mg (12/10/2019), 1,615 mg (1/21/2020), 1,600 mg (4/2/2020), 1,615 mg (4/23/2020), 1,600 mg (7/1/2020), 1,600 mg (7/22/2020), 1,600 mg (8/13/2020), 1,795 mg (9/3/2020), 1,795 mg (9/24/2020)      Chemotherapy    Treatment Summary   Plan Name: OP GYN PACLITAXEL CARBOPLATIN (AUC 6) Q3W  Treatment Goal: Palliative  Status: Inactive  Start Date: 2/28/2019  End Date: 6/18/2019  Provider: Will Trevizo Jr., MD  Chemotherapy: bevacizumab (AVASTIN) 15 mg/kg = 1,600 mg in sodium chloride 0.9% 100 mL chemo infusion, 15 mg/kg = 1,600 mg (100 % of original dose 15 mg/kg), Intravenous, Clinic/[facility] 1 time, 6 of 6 cycles  Dose modification: 15 mg/kg (original dose 15 mg/kg, Cycle 1)  Administration: 1,600 mg (2/28/2019), 1,600 mg (3/21/2019), 1,600 mg (4/11/2019), 1,600 mg (5/7/2019), 1,600 mg (5/28/2019), 1,510 mg (6/18/2019)    CARBOplatin (PARAPLATIN) 870 mg in sodium chloride 0.9% 337 mL chemo infusion, 870 mg  (100 % of original dose 868.8 mg), Intravenous, Clinic/[facility] 1 time, 6 of 6 cycles  Dose modification:   (original dose 868.8 mg, Cycle 1)  Administration: 870 mg (2/28/2019), 870 mg (3/21/2019), 900 mg (4/11/2019), 870 mg (5/7/2019), 660 mg (5/28/2019), 690 mg (6/18/2019)    PACLitaxel (TAXOL) 175 mg/m2 = 396 mg in sodium chloride 0.9% 566 mL chemo infusion, 175 mg/m2 = 396 mg, Intravenous, Clinic/[facility] 1 time, 6 of 6 cycles  Dose modification: 140 mg/m2 (80 % of original dose 175 mg/m2, Cycle 5)  Administration: 396 mg (2/28/2019), 396 mg (3/21/2019), 396 mg (4/11/2019), 396 mg (5/7/2019), 318 mg (5/28/2019), 306 mg (6/18/2019)    Plan Name: OP BEVACIZUMAB Q3W   Treatment Goal: Maintenance  Status: Inactive  Start Date: 10/9/2019  End Date: 9/24/2020  Provider: Oscar Mckeon MD  Chemotherapy: bevacizumab (AVASTIN) 15 mg/kg = 1,615 mg in sodium chloride 0.9% 100 mL chemo infusion, 15 mg/kg = 1,615 mg, Intravenous, Clinic/[facility] 1 time, 11 of 17 cycles  Administration: 1,615 mg (10/9/2019), 1,615 mg (10/29/2019), 1,615 mg (12/10/2019), 1,615 mg (1/21/2020), 1,600 mg (4/2/2020), 1,615 mg (4/23/2020), 1,600 mg (7/1/2020), 1,600 mg (7/22/2020), 1,600 mg (8/13/2020), 1,795 mg (9/3/2020), 1,795 mg (9/24/2020)    Plan Name: OP GYN PACLITAXEL CARBOPLATIN desensitization (AUC 5) Q3W  Treatment Goal: Control  Status: Inactive  Start Date: 9/29/2023  End Date: 2/14/2024  Provider: Ismael Juarez MD  Chemotherapy: CARBOplatin (PARAPLATIN) 650 mg in sodium chloride 0.9% 335 mL chemo infusion, 650 mg (100 % of original dose 648 mg), Intravenous, Clinic/[facility] 1 time, 6 of 17 cycles  Dose modification:   (original dose 648 mg, Cycle 1),   (original dose 853.8 mg, Cycle 2),   (original dose 730 mg, Cycle 3),   (original dose 730 mg, Cycle 4), 5 mg (original dose 730 mg, Cycle 4), 7.3 mg (original dose 730 mg, Cycle 4), 5 mg (original dose 730 mg, Cycle 4, Reason: MD Discretion, Comment: desensitization), 73 mg (original dose 730 mg, Cycle  4), 720 mg (original dose 730 mg, Cycle 4, Reason: MD Discretion, Comment: desensitization), 648.97 mg (original dose 730 mg, Cycle 4, Reason: MD Discretion, Comment: desensitization), 0.61 mg (original dose 730 mg, Cycle 5, Reason: MD Discretion, Comment: desensitization), 5 mg (original dose 730 mg, Cycle 5, Reason: MD Discretion, Comment: desens), 60.5 mg (original dose 730 mg, Cycle 5, Reason: MD Discretion, Comment: desensitization),   (original dose 730 mg, Cycle 5, Reason: MD Discretion, Comment: new scr), 6.05 mg (original dose 730 mg, Cycle 5, Reason: Dose not tolerated, Comment: desensitization)  Administration: 650 mg (9/29/2023), 730 mg (11/10/2023), 710 mg (10/20/2023), 5 mg (1/3/2024), 5 mg (1/3/2024), 75 mg (1/3/2024), 650 mg (1/3/2024), 5 mg (2/14/2024), 5 mg (2/14/2024), 75 mg (2/14/2024), 650 mg (2/14/2024), 5 mg (1/24/2024), 60 mg (1/24/2024), 605 mg (1/24/2024), 5 mg (1/24/2024)    PACLitaxeL (TAXOL) 175 mg/m2 = 402 mg in sodium chloride 0.9% 500 mL chemo infusion, 175 mg/m2 = 402 mg, Intravenous, Clinic/HOD 1 time, 6 of 17 cycles  Administration: 402 mg (9/29/2023), 402 mg (10/20/2023), 408 mg (11/10/2023), 408 mg (1/3/2024), 408 mg (1/24/2024), 408 mg (2/14/2024)    Plan Name: OP GYN bevacizumab liposomal DOXOrubicin Q4W  Treatment Goal: Control  Status: Active  Start Date: 9/3/2024 (Planned)  End Date: 7/22/2025 (Planned)  Provider: Torri House MD  Chemotherapy: DOXOrubicin liposome (DOXIL) 92 mg in D5W 296 mL chemo infusion, 40 mg/m2 = 92 mg, Intravenous, Clinic/HOD 1 time, 0 of 12 cycles    bevacizumab-awwb (MVASI) 10 mg/kg = 1,125 mg in 0.9% NaCl 100 mL infusion, 10 mg/kg = 1,125 mg, Intravenous, Clinic/HOD 1 time, 0 of 12 cycles       Ovarian cancer, bilateral   8/15/2019 Initial Diagnosis    Ovarian cancer, bilateral     10/9/2019 - 9/24/2020 Chemotherapy    Treatment Summary   Plan Name: OP BEVACIZUMAB Q3W   Treatment Goal: Maintenance  Status: Inactive  Start Date: 10/9/2019  End  Date: 9/24/2020  Provider: Oscar Mckeon MD  Chemotherapy: dexAMETHasone tablet 8 mg, 8 mg (100 % of original dose 8 mg), Oral, Clinic/HOD 1 time, 2 of 8 cycles  Dose modification: 8 mg (original dose 8 mg, Cycle 8), 8 mg (original dose 8 mg, Cycle 12)  Administration: 8 mg (7/22/2020), 8 mg (8/13/2020)  bevacizumab (AVASTIN) 15 mg/kg = 1,615 mg in sodium chloride 0.9% 100 mL chemo infusion, 15 mg/kg = 1,615 mg, Intravenous, Clinic/HOD 1 time, 11 of 17 cycles  Administration: 1,615 mg (10/9/2019), 1,615 mg (10/29/2019), 1,615 mg (12/10/2019), 1,615 mg (1/21/2020), 1,600 mg (4/2/2020), 1,615 mg (4/23/2020), 1,600 mg (7/1/2020), 1,600 mg (7/22/2020), 1,600 mg (8/13/2020), 1,795 mg (9/3/2020), 1,795 mg (9/24/2020)     9/29/2023 - 2/14/2024 Chemotherapy    Treatment Summary   Plan Name: OP GYN PACLITAXEL CARBOPLATIN desensitization (AUC 5) Q3W  Treatment Goal: Control  Status: Inactive  Start Date: 9/29/2023  End Date: 2/14/2024  Provider: Ismael Juarez MD  Chemotherapy: CARBOplatin (PARAPLATIN) 650 mg in sodium chloride 0.9% 335 mL chemo infusion, 650 mg (100 % of original dose 648 mg), Intravenous, Clinic/HOD 1 time, 6 of 17 cycles  Dose modification:   (original dose 648 mg, Cycle 1),   (original dose 853.8 mg, Cycle 2),   (original dose 730 mg, Cycle 3),   (original dose 730 mg, Cycle 4), 5 mg (original dose 730 mg, Cycle 4), 7.3 mg (original dose 730 mg, Cycle 4), 5 mg (original dose 730 mg, Cycle 4, Reason: MD Discretion, Comment: desensitization), 73 mg (original dose 730 mg, Cycle 4), 720 mg (original dose 730 mg, Cycle 4, Reason: MD Discretion, Comment: desensitization), 648.97 mg (original dose 730 mg, Cycle 4, Reason: MD Discretion, Comment: desensitization), 0.61 mg (original dose 730 mg, Cycle 5, Reason: MD Discretion, Comment: desensitization), 5 mg (original dose 730 mg, Cycle 5, Reason: MD Discretion, Comment: desens), 60.5 mg (original dose 730 mg, Cycle 5, Reason: MD Discretion, Comment:  desensitization),   (original dose 730 mg, Cycle 5, Reason: MD Discretion, Comment: new scr), 6.05 mg (original dose 730 mg, Cycle 5, Reason: Dose not tolerated, Comment: desensitization)  Administration: 650 mg (9/29/2023), 730 mg (11/10/2023), 710 mg (10/20/2023), 5 mg (1/3/2024), 5 mg (1/3/2024), 75 mg (1/3/2024), 650 mg (1/3/2024), 5 mg (2/14/2024), 5 mg (2/14/2024), 75 mg (2/14/2024), 650 mg (2/14/2024), 5 mg (1/24/2024), 60 mg (1/24/2024), 605 mg (1/24/2024), 5 mg (1/24/2024)  PACLitaxeL (TAXOL) 175 mg/m2 = 402 mg in sodium chloride 0.9% 500 mL chemo infusion, 175 mg/m2 = 402 mg, Intravenous, Clinic/HOD 1 time, 6 of 17 cycles  Administration: 402 mg (9/29/2023), 402 mg (10/20/2023), 408 mg (11/10/2023), 408 mg (1/3/2024), 408 mg (1/24/2024), 408 mg (2/14/2024)     9/6/2024 -  Chemotherapy    Treatment Summary   Plan Name: OP GYN bevacizumab liposomal DOXOrubicin Q4W  Treatment Goal: Control  Status: Active  Start Date: 9/6/2024  End Date: 8/5/2025 (Planned)  Provider: Torri House MD  Chemotherapy: DOXOrubicin liposome (DOXIL) 92 mg in D5W 611 mL chemo infusion, 40 mg/m2 = 92 mg, Intravenous, Clinic/HOD 1 time, 2 of 12 cycles  Administration: 90 mg (10/15/2024), 92 mg (9/6/2024)  bevacizumab-awwb (MVASI) 1,100 mg in 0.9% NaCl 100 mL infusion, 1,125 mg, Intravenous, Clinic/HOD 1 time, 2 of 12 cycles  Administration: 1,100 mg (9/20/2024), 1,100 mg (9/6/2024)      Chemotherapy    Treatment Summary   Plan Name: OP GYN PACLITAXEL CARBOPLATIN (AUC 6) Q3W  Treatment Goal: Palliative  Status: Inactive  Start Date: 2/28/2019  End Date: 6/18/2019  Provider: Will Trevizo Jr., MD  Chemotherapy: bevacizumab (AVASTIN) 15 mg/kg = 1,600 mg in sodium chloride 0.9% 100 mL chemo infusion, 15 mg/kg = 1,600 mg (100 % of original dose 15 mg/kg), Intravenous, Clinic/HOD 1 time, 6 of 6 cycles  Dose modification: 15 mg/kg (original dose 15 mg/kg, Cycle 1)  Administration: 1,600 mg (2/28/2019), 1,600 mg (3/21/2019), 1,600 mg  (4/11/2019), 1,600 mg (5/7/2019), 1,600 mg (5/28/2019), 1,510 mg (6/18/2019)    CARBOplatin (PARAPLATIN) 870 mg in sodium chloride 0.9% 337 mL chemo infusion, 870 mg (100 % of original dose 868.8 mg), Intravenous, Clinic/HOD 1 time, 6 of 6 cycles  Dose modification:   (original dose 868.8 mg, Cycle 1)  Administration: 870 mg (2/28/2019), 870 mg (3/21/2019), 900 mg (4/11/2019), 870 mg (5/7/2019), 660 mg (5/28/2019), 690 mg (6/18/2019)    PACLitaxel (TAXOL) 175 mg/m2 = 396 mg in sodium chloride 0.9% 566 mL chemo infusion, 175 mg/m2 = 396 mg, Intravenous, Clinic/HOD 1 time, 6 of 6 cycles  Dose modification: 140 mg/m2 (80 % of original dose 175 mg/m2, Cycle 5)  Administration: 396 mg (2/28/2019), 396 mg (3/21/2019), 396 mg (4/11/2019), 396 mg (5/7/2019), 318 mg (5/28/2019), 306 mg (6/18/2019)    Plan Name: OP BEVACIZUMAB Q3W   Treatment Goal: Maintenance  Status: Inactive  Start Date: 10/9/2019  End Date: 9/24/2020  Provider: Oscar Mckeon MD  Chemotherapy: bevacizumab (AVASTIN) 15 mg/kg = 1,615 mg in sodium chloride 0.9% 100 mL chemo infusion, 15 mg/kg = 1,615 mg, Intravenous, Clinic/HOD 1 time, 11 of 17 cycles  Administration: 1,615 mg (10/9/2019), 1,615 mg (10/29/2019), 1,615 mg (12/10/2019), 1,615 mg (1/21/2020), 1,600 mg (4/2/2020), 1,615 mg (4/23/2020), 1,600 mg (7/1/2020), 1,600 mg (7/22/2020), 1,600 mg (8/13/2020), 1,795 mg (9/3/2020), 1,795 mg (9/24/2020)    Plan Name: OP GYN PACLITAXEL CARBOPLATIN desensitization (AUC 5) Q3W  Treatment Goal: Control  Status: Inactive  Start Date: 9/29/2023  End Date: 2/14/2024  Provider: Ismael Juarez MD  Chemotherapy: CARBOplatin (PARAPLATIN) 650 mg in sodium chloride 0.9% 335 mL chemo infusion, 650 mg (100 % of original dose 648 mg), Intravenous, Clinic/HOD 1 time, 6 of 17 cycles  Dose modification:   (original dose 648 mg, Cycle 1),   (original dose 853.8 mg, Cycle 2),   (original dose 730 mg, Cycle 3),   (original dose 730 mg, Cycle 4), 5 mg (original dose 730 mg, Cycle  4), 7.3 mg (original dose 730 mg, Cycle 4), 5 mg (original dose 730 mg, Cycle 4, Reason: MD Discretion, Comment: desensitization), 73 mg (original dose 730 mg, Cycle 4), 720 mg (original dose 730 mg, Cycle 4, Reason: MD Discretion, Comment: desensitization), 648.97 mg (original dose 730 mg, Cycle 4, Reason: MD Discretion, Comment: desensitization), 0.61 mg (original dose 730 mg, Cycle 5, Reason: MD Discretion, Comment: desensitization), 5 mg (original dose 730 mg, Cycle 5, Reason: MD Discretion, Comment: desens), 60.5 mg (original dose 730 mg, Cycle 5, Reason: MD Discretion, Comment: desensitization),   (original dose 730 mg, Cycle 5, Reason: MD Discretion, Comment: new scr), 6.05 mg (original dose 730 mg, Cycle 5, Reason: Dose not tolerated, Comment: desensitization)  Administration: 650 mg (9/29/2023), 730 mg (11/10/2023), 710 mg (10/20/2023), 5 mg (1/3/2024), 5 mg (1/3/2024), 75 mg (1/3/2024), 650 mg (1/3/2024), 5 mg (2/14/2024), 5 mg (2/14/2024), 75 mg (2/14/2024), 650 mg (2/14/2024), 5 mg (1/24/2024), 60 mg (1/24/2024), 605 mg (1/24/2024), 5 mg (1/24/2024)    PACLitaxeL (TAXOL) 175 mg/m2 = 402 mg in sodium chloride 0.9% 500 mL chemo infusion, 175 mg/m2 = 402 mg, Intravenous, [facility] time, 6 of 17 cycles  Administration: 402 mg (9/29/2023), 402 mg (10/20/2023), 408 mg (11/10/2023), 408 mg (1/3/2024), 408 mg (1/24/2024), 408 mg (2/14/2024)    Plan Name: OP GYN bevacizumab liposomal DOXOrubicin Q4W  Treatment Goal: Control  Status: Active  Start Date: 9/3/2024 (Planned)  End Date: 7/22/2025 (Planned)  Provider: Torri House MD  Chemotherapy: DOXOrubicin liposome (DOXIL) 92 mg in D5W 296 mL chemo infusion, 40 mg/m2 = 92 mg, Intravenous, Clinic/HOD 1 time, 0 of 12 cycles    bevacizumab-awwb (MVASI) 10 mg/kg = 1,125 mg in 0.9% NaCl 100 mL infusion, 10 mg/kg = 1,125 mg, Intravenous, Clinic/HOD 1 time, 0 of 12 cycles             Past Medical History:   Diagnosis Date    Anemia     Anxiety     Arthritis      knees    Cancer     ovarian    CHF (congestive heart failure)     Hx antineoplastic chemotherapy     last 2019    Hyperlipemia     Hypertension     Neck pain     Ovarian cancer 2019    CHEMO    Peritoneal carcinomatosis 2019       Past Surgical History:   Procedure Laterality Date    breast reduction  10/02/2018    CATARACT EXTRACTION Bilateral     2023     SECTION      X 1    COLONOSCOPY N/A 2021    Procedure: COLONOSCOPY;  Surgeon: Paulette Rojas MD;  Location: [facility] ENDO;  Service: Gastroenterology;  Laterality: N/A;    CYSTOSCOPY W/ URETERAL STENT PLACEMENT Right 2019    Procedure: CYSTOSCOPY, WITH URETERAL STENT INSERTION;  Surgeon: Timmy Santiago IV, MD;  Location: [facility] OR;  Service: Urology;  Laterality: Right;    CYSTOSCOPY W/ URETERAL STENT REMOVAL  10/04/2019    DILATION AND CURETTAGE OF UTERUS      HYSTERECTOMY      RALH for fibroids (still has ovaries)    INSERTION OF VENOUS ACCESS PORT Left 2019    Procedure: INSERTION, VENOUS ACCESS PORT;  Surgeon: Ulisses Monzon MD;  Location: [facility] OR;  Service: General;  Laterality: Left;  Left internal jugular     LYSIS OF ADHESIONS OF URETER N/A 08/15/2019    Procedure: URETEROLYSIS;  Surgeon: Ismael Juarez MD;  Location: [facility] OR;  Service: OB/GYN;  Laterality: N/A;    OMENTECTOMY N/A 08/15/2019    Procedure: OMENTECTOMY;  Surgeon: Ismael Juarez MD;  Location: [facility] OR;  Service: OB/GYN;  Laterality: N/A;    OOPHORECTOMY      RETROGRADE PYELOGRAPHY Right 2019    Procedure: PYELOGRAM, RETROGRADE;  Surgeon: Timmy Santiago IV, MD;  Location: [facility] OR;  Service: Urology;  Laterality: Right;    ROBOT-ASSISTED LAPAROSCOPIC SALPINGO-OOPHORECTOMY USING DA TIA XI Bilateral 08/15/2019    Procedure: XI ROBOTIC SALPINGO-OOPHORECTOMY;  Surgeon: Ismael Juarez MD;  Location: [facility];  Service: OB/GYN;  Laterality: Bilateral;    TOTAL REDUCTION MAMMOPLASTY  2018    TUBAL LIGATION         Family History   Problem Relation  Name Age of Onset    Glaucoma Mother      No Known Problems Father      Colon cancer Brother      Breast cancer Maternal Aunt      Breast cancer Paternal Aunt      Ovarian cancer Paternal Aunt      Anesthesia problems Other cousin     Thrombophilia Neg Hx         Review of patient's allergies indicates:  No Known Allergies    Social History     Tobacco Use    Smoking status: Former     Current packs/day: 0.00     Average packs/day: 1 pack/day for 25.0 years (25.0 ttl pk-yrs)     Types: Cigarettes     Start date: 10/1/1993     Quit date: 10/1/2018     Years since quittin.0    Smokeless tobacco: Never    Tobacco comments:     States started quit 2 months ago after 30 years   Substance Use Topics    Alcohol use: Yes     Comment: occasionally  No alcohol 72h prior to sx    Drug use: No     Labs   Labs:  No visits with results within 2 Day(s) from this visit.   Latest known visit with results is:   Lab Visit on 10/09/2024   Component Date Value Ref Range Status    Phosphorus 10/09/2024 3.3  2.7 - 4.5 mg/dL Final    Magnesium 10/09/2024 2.1  1.6 - 2.6 mg/dL Final    Sodium 10/09/2024 143  136 - 145 mmol/L Final    Potassium 10/09/2024 5.2 (H)  3.5 - 5.1 mmol/L Final    Chloride 10/09/2024 108  95 - 110 mmol/L Final    CO2 10/09/2024 23  23 - 29 mmol/L Final    Glucose 10/09/2024 88  70 - 110 mg/dL Final    BUN 10/09/2024 31 (H)  8 - 23 mg/dL Final    Creatinine 10/09/2024 1.2  0.5 - 1.4 mg/dL Final    Calcium 10/09/2024 9.8  8.7 - 10.5 mg/dL Final    Total Protein 10/09/2024 7.0  6.0 - 8.4 g/dL Final    Albumin 10/09/2024 2.9 (L)  3.5 - 5.2 g/dL Final    Total Bilirubin 10/09/2024 0.8  0.1 - 1.0 mg/dL Final    Comment: For infants and newborns, interpretation of results should be based  on gestational age, weight and in agreement with clinical  observations.    Premature Infant recommended reference ranges:  Up to 24 hours.............<8.0 mg/dL  Up to 48 hours............<12.0 mg/dL  3-5 days..................<15.0  mg/dL  6-29 days.................<15.0 mg/dL      Alkaline Phosphatase 10/09/2024 451 (H)  55 - 135 U/L Final    AST 10/09/2024 313 (H)  10 - 40 U/L Final    ALT 10/09/2024 167 (H)  10 - 44 U/L Final    eGFR 10/09/2024 49 (A)  >60 mL/min/1.73 m^2 Final    Anion Gap 10/09/2024 12  8 - 16 mmol/L Final    WBC 10/09/2024 5.40  3.90 - 12.70 K/uL Final    RBC 10/09/2024 4.63  4.00 - 5.40 M/uL Final    Hemoglobin 10/09/2024 12.7  12.0 - 16.0 g/dL Final    Hematocrit 10/09/2024 41.4  37.0 - 48.5 % Final    MCV 10/09/2024 89  82 - 98 fL Final    MCH 10/09/2024 27.4  27.0 - 31.0 pg Final    MCHC 10/09/2024 30.7 (L)  32.0 - 36.0 g/dL Final    RDW 10/09/2024 18.4 (H)  11.5 - 14.5 % Final    Platelets 10/09/2024 330  150 - 450 K/uL Final    MPV 10/09/2024 10.1  9.2 - 12.9 fL Final    Immature Granulocytes 10/09/2024 0.4  0.0 - 0.5 % Final    Gran # (ANC) 10/09/2024 3.4  1.8 - 7.7 K/uL Final    Immature Grans (Abs) 10/09/2024 0.02  0.00 - 0.04 K/uL Final    Comment: Mild elevation in immature granulocytes is non specific and   can be seen in a variety of conditions including stress response,   acute inflammation, trauma and pregnancy. Correlation with other   laboratory and clinical findings is essential.      Lymph # 10/09/2024 1.1  1.0 - 4.8 K/uL Final    Mono # 10/09/2024 0.8  0.3 - 1.0 K/uL Final    Eos # 10/09/2024 0.0  0.0 - 0.5 K/uL Final    Baso # 10/09/2024 0.11  0.00 - 0.20 K/uL Final    nRBC 10/09/2024 0  0 /100 WBC Final    Gran % 10/09/2024 62.2  38.0 - 73.0 % Final    Lymph % 10/09/2024 19.6  18.0 - 48.0 % Final    Mono % 10/09/2024 15.4 (H)  4.0 - 15.0 % Final    Eosinophil % 10/09/2024 0.4  0.0 - 8.0 % Final    Basophil % 10/09/2024 2.0 (H)  0.0 - 1.9 % Final    Differential Method 10/09/2024 Automated   Final    LD 10/09/2024 2,634 (H)  110 - 260 U/L Final    Results are increased in hemolyzed samples.    Specimen UA 10/09/2024 Urine, Clean Catch   Final    Color, UA 10/09/2024 Yellow  Yellow, Straw, [place] Final     Appearance, UA 10/09/2024 Clear  Clear Final    pH, UA 10/09/2024 6.0  5.0 - 8.0 Final    Specific Gravity, UA 10/09/2024 1.025  1.005 - 1.030 Final    Protein, UA 10/09/2024 1+ (A)  Negative Final    Comment: Recommend a 24 hour urine protein or a urine   protein/creatinine ratio if globulin induced proteinuria is  clinically suspected.      Glucose, UA 10/09/2024 Negative  Negative Final    Ketones, UA 10/09/2024 Trace (A)  Negative Final    Bilirubin (UA) 10/09/2024 Negative  Negative Final    Occult Blood UA 10/09/2024 Negative  Negative Final    Nitrite, UA 10/09/2024 Negative  Negative Final    Urobilinogen, UA 10/09/2024 2.0-3.0 (A)  <2.0 EU/dL Final    Leukocytes, UA 10/09/2024 Negative  Negative Final    RBC, UA 10/09/2024 0  0 - 4 /hpf Final    WBC, UA 10/09/2024 4  0 - 5 /hpf Final    Bacteria 10/09/2024 Rare  None-Occ /hpf Final    Squam Epithel, UA 10/09/2024 1  /hpf Final    Hyaline Casts, UA 10/09/2024 7 (A)  0-1/lpf /lpf Final    Granular Casts, UA 10/09/2024 3 (A)  None /lpf Final    Microscopic Comment 10/09/2024 SEE COMMENT   Final    Comment: Other formed elements not mentioned in the report are not   present in the microscopic examination.           Imaging   CT Chest Abdomen Pelvis With IV Contrast (XPD) NO Oral Contrast 12/12/2023  Details    Reading Physician Reading Date Result Priority   Michele Kim MD  [phone] 12/12/2023 STAT     Narrative & Impression  EXAMINATION:  CT CHEST ABDOMEN PELVIS WITH IV CONTRAST (XPD)     CLINICAL HISTORY:  Hypokalemia,follow up ovarian cancer on treatment; evaluate treatment response for subsequent treatment planning.     TECHNIQUE:  Axial CT imaging was performed through the chest, abdomen and pelvis with  100cc  of intravenous contrast. Multiplanar reformats were performed and interpreted.     COMPARISON:  08/21/2023, 09/29/2022 and 09/24/2020     FINDINGS:  Chest:     The heart, great vessels, and mediastinal structures are within normal limits.  No thoracic adenopathy.  Left-sided MediPort in the SVC.  Aortic atherosclerosis.     The lungs are clear bilaterally. No pleural effusions.     Abdomen:     The liver, spleen, kidneys, adrenal glands are within normal limits.  Stable 10 mm uncinate process pancreatic cyst.  No biliary ductal dilatation.     The gallbladder is unremarkable.     No free fluid, free air, or inflammatory change.     The small bowel is within normal limits.  Colonic diverticulosis.  Supraumbilical abdominal wall hernia containing a nondistended segment of the transverse colon.  No evidence of strangulation or incarceration.  The appendix is normal.     Aortic atherosclerosis without evidence of aneurysm.     Pelvis:     The urinary bladder is unremarkable. The uterus has been removed.  No free pelvic fluid or adenopathy.     No significant osseous abnormality is identified.  No suspicious osseous lesions.     Impression:     No CT evidence of recurrent or metastatic disease in the chest, abdomen or pelvis.     All CT scans at this facility are performed  using dose modulation techniques as appropriate to performed exam including the following:  automated exposure control; adjustment of mA and/or kV according to the patients size (this includes techniques or standardized protocols for targeted exams where dose is matched to indication/reason for exam: i.e. extremities or head);  iterative reconstruction technique.        NM PET CT FDG SKULL BASE TO MID THIGH - 02/26/2024  FINDINGS:  Head/neck: There is normal physiologic uptake noted within the visualized brain parenchyma. No FDG avid adenopathy within the neck.     Chest: No FDG avid pulmonary nodules/masses. No FDG avid mediastinal, hilar or axillary lymph nodes.A left-sided MediPort catheter is noted.     Abdomen/Pelvis: Normal physiologic uptake noted within the liver, spleen, urinary tract, and bowel.The adrenals are unremarkable.  There are a couple of left periaortic  retroperitoneal lymph nodes that demonstrate an SUV max of up to 2.8 and 3.1.  Findings are nonspecific.  There is diverticulosis seen scattered throughout the colon.  The midportion of the transverse colon is contained within a midline ventral hernia.  No evidence for bowel obstruction..     Skeletal: No FDG avid osseus lesions identified.     Impression:  1. Low level nonspecific uptake associated with a couple of left periaortic retroperitoneal lymph nodes.  It is possible these lymph nodes may just be reactive in nature.  Continued follow-up recommended.      NM PET CT FDG SKULL BASE TO MID THIGH - 04/08/2024  FINDINGS:  Quality of the study: Adequate.     SUV max of the liver parenchyma is 3.7     Neck: No abnormal FDG avidity.  No lymphadenopathy or mass.     Chest: No FDG avid mediastinal, hilar, or axillary lymphadenopathy.  No pleural effusion.  No pulmonary nodule.     Abdomen/pelvis: Interval resolution of the low level FDG avidity of the left para-aortic lymph nodes.  Lymph nodes are also smaller compared to the prior exam and normal in size on this exam.  No abnormal FDG avidity.  No ascites.  No lymphadenopathy     Skeletal structures: No abnormal FDG avidity.  No focal lytic or sclerotic lesion in the osseous structures.     Physiologic uptake of the tracer is present within the brain, salivary glands, myocardium, GI and  tracts.     Incidental CT findings: No interval change in the umbilical hernia containing a loop of colon.  Left IJ MediPort with its tip in the SVC.    Impression:  No FDG PET/CT findings to suggest recurrent or metastatic disease.  Interval resolution of the low level FDG avidity seen in the left para-aortic retroperitoneal lymph nodes on the prior exam.      NM PET CT FDG SKULL BASE TO MID THIGH - 07/02/2024  CLINICAL HISTORY:  surveillance; Drug-induced polyneuropathy     TECHNIQUE:  13.71 mCi of F18-FDG was administered intravenously.  After an approximately 60 min  distribution time, PET/CT images were acquired from the skull base to the mid thigh. Transmission images were acquired to correct for attenuation using a whole body low-dose CT scan without contrast.     COMPARISON:  Prior PET CTs, most recent 04/08/2024; CT abdomen/pelvis 05/29/2024     FINDINGS:  Head/neck: New right supraclavicular/neck base FDG avid lymph nodes, SUV max 11.  Otherwise normal physiologic activity.     Chest: New enlarged left subpectoral FDG avid lymph node, SUV max 11.  No FDG avid right axillary adenopathy.  New mediastinal FDG avid adenopathy (paratracheal, prevascular subcarinal).  SUV max 15 (pre-vascular).  New left internal mammary FDG avid lymph node, SUV max 6.5.  New anterior pericardial FDG avid nodule, axial fused image 175.  New left paraspinal FDG avid nodule, axial fused image 176.     Trace bilateral pleural effusions.  No FDG avid pulmonary nodule appreciated.     Abdomen/pelvis: Small FDG avid left upper quadrant omental nodularity/thickening, SUV max 3.5 (axial fused image 182 through 210).  Left pericolic gutter FDG avid soft tissue nodule, SUV max 9 (axial fused image 273).  Suspected small FDG avid nodules/lymph nodes closely associated with small bowel loops within the central upper pelvis, axial fused image 301 and 316.  FDG avid right iliac and  lymph nodes (axial fused image 324 and 331), SUV max 6.4.     Trace nonspecific pelvic free fluid.  Colonic diverticulosis.  Bowel containing anterior ventral hernia as noted on prior CT.     Skeletal/subcutaneous structures: No suspicious FDG avid osseous abnormality.     Impression:  Disease progression with neck base/chest, abdomen and pelvis FDG avid disease now present as above.     Electronically signed by:Neil Mejia MD  Date:                                            07/02/2024         NM PET CT FDG Skull Base to Mid Thigh - 10/11/2024  Impression:  Overall there has been progression of disease.     -There  is continued hypermetabolic adenopathy within the neck, chest (mediastinum and chest wall).  Some of these areas are stable with some nodes slightly diminished and some decreased degrees of FDG uptake within some of these nodes.     -However, there is extensive progression of peritoneal carcinomatosis throughout the abdomen and pelvis with increasing number and size of peritoneal hypermetabolic nodularity and increasing amount of ascites.  There is also large amount heterogeneous FDG uptake within the left hepatic lobe concerning for hepatic metastatic disease which is new.     -there has also been interval development moderate right-sided hydronephrosis and hydroureter with ureter likely being obstructed by peritoneal implants.     -increasing small to moderate bilateral loculated pleural effusions.  Extensive mixed interstitial and ground-glass infiltrates throughout the lungs.  Question pneumonia/post treatment related changes.  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  Patient was then treated with Avastin maintenance  08/21/2023 CT CAP: Marked interval disease progression with severe peritoneal carcinomatosis as detailed above.  12/12/2023 CT CAP: No CT evidence of recurrent or metastatic disease in the chest, abdomen or pelvis.  : 7 >> 21 >> 32 >> 64 >> 11  Started Carbo/Taxol 09/29/2023  C1 no complications  C2 and C3 (11/10/23): reaction consisting of feeling presyncopal, SOB, sweating; vitals significant for hypoxia and hypotension; evaluated by AI and recs for desensitization  C4  -C6with desensitization  Per medical record, patient declined treatment until after the new year  CT CAP 12/12/23 showed CLYDE  PET-CT 02/26/24: Low level nonspecific uptake associated with a couple of left periaortic retroperitoneal lymph nodes.  It is possible these lymph nodes may just be reactive in nature  PET-CT 04/08/2024:  No FDG PET/CT findings to suggest recurrent or metastatic disease. Interval resolution of the low level FDG avidity seen in the left para-aortic retroperitoneal lymph nodes on the prior exam.   PET-CT 07/02/24: Disease progression with neck base/chest, abdomen and pelvis FDG avid disease now present as above.   Presented in Gyn/Onc tumor board 07/31/24 with consensus for FRa testing and proceed with mirvetuximab if positive and if negative proceed with Doxil  Plan  Tumor NGS/Blood NGS/FRa testing negative  Start Doxorubicn/Avstin 09/06/2024 however C2 delayed 2/2 to hospital admission for PNA 10/02/2024  NM PET-CT 10/11/2024 showed POD  Labs reviewed and LFTs increasing; hold Soursoup  Will plan for repeat imaging after C3 of Doxorubicin  Screen for clinical trials and coordinate with gynecologic oncologist; the treatment options are paclitaxel, topotecan, gemcitabine and none of these are superior to the other; the median response rate is poor  (10-35%) with a high risk of rapid progression after the initiation of treatment which I believe we are seeing.        LLE  Venous Ultrasound showed no DVT      Chronic Medical Conditions  Osteoarthritis  HTN  Anxiety        Med Onc Chart Routing      Follow up with physician 1 week.   Follow up with [facility]    Infusion scheduling note   Doxorubicin today (hold Bevacizumab) and 1L IVF   Injection scheduling note    Labs CBC, CMP, phosphorus and magnesium   Scheduling:  Preferred lab:  Lab interval:     Imaging Other   LLE US   Pharmacy appointment    Other referrals                The patient was seen, interviewed and examined. Pertinent lab and  radiologic studies were reviewed. Pt instructed to call should they develop concerning signs/symptoms or have further questions.        Portions of the record may have been created with voice recognition software. Occasional wrong-word or sound-a-likez substitutions may have occurred due to the inherent limitations of voice recognition software. Read the chart carefully and recognize, using context, where substitutions have occurred.      Torri Pugh MD    Hematology/Oncology

## 2024-10-15 NOTE — PLAN OF CARE
Discussed plan of care with pt. Addressed any and ongoing concerns. Pt denies   Problem: Adult Inpatient Plan of Care  Goal: Plan of Care Review  Outcome: Progressing  Goal: Patient-Specific Goal (Individualized)  Outcome: Progressing  Flowsheets (Taken 10/15/2024 1406)  Individualized Care Needs: Reclined position with pillow and blanket , snacks offered  Anxieties, Fears or Concerns: None  Patient/Family-Specific Goals (Include Timeframe): Will tolerate infusion without adverse affects  Goal: Absence of Hospital-Acquired Illness or Injury  Outcome: Progressing  Intervention: Identify and Manage Fall Risk  Flowsheets (Taken 10/15/2024 1406)  Safety Promotion/Fall Prevention: in recliner, wheels locked  Intervention: Prevent Infection  Flowsheets (Taken 10/15/2024 1406)  Infection Prevention:   hand hygiene promoted   equipment surfaces disinfected   personal protective equipment utilized  Goal: Optimal Comfort and Wellbeing  Outcome: Progressing

## 2024-10-22 ENCOUNTER — LAB VISIT (OUTPATIENT)
Dept: LAB | Facility: HOSPITAL | Age: 69
End: 2024-10-22
Attending: INTERNAL MEDICINE
Payer: MEDICARE

## 2024-10-22 ENCOUNTER — HOSPITAL ENCOUNTER (INPATIENT)
Facility: HOSPITAL | Age: 69
LOS: 8 days | Discharge: HOSPICE | DRG: 659 | End: 2024-10-30
Attending: EMERGENCY MEDICINE | Admitting: INTERNAL MEDICINE
Payer: MEDICARE

## 2024-10-22 ENCOUNTER — TELEPHONE (OUTPATIENT)
Dept: HEMATOLOGY/ONCOLOGY | Facility: CLINIC | Age: 69
End: 2024-10-22
Payer: MEDICARE

## 2024-10-22 ENCOUNTER — OFFICE VISIT (OUTPATIENT)
Dept: HEMATOLOGY/ONCOLOGY | Facility: CLINIC | Age: 69
End: 2024-10-22
Payer: MEDICARE

## 2024-10-22 VITALS
SYSTOLIC BLOOD PRESSURE: 88 MMHG | HEART RATE: 75 BPM | DIASTOLIC BLOOD PRESSURE: 58 MMHG | BODY MASS INDEX: 35.71 KG/M2 | HEIGHT: 67 IN | OXYGEN SATURATION: 93 % | TEMPERATURE: 97 F | WEIGHT: 227.5 LBS

## 2024-10-22 DIAGNOSIS — R07.9 CHEST PAIN: ICD-10-CM

## 2024-10-22 DIAGNOSIS — R74.8 ELEVATED LIVER ENZYMES: ICD-10-CM

## 2024-10-22 DIAGNOSIS — D84.821 IMMUNOCOMPROMISED STATE DUE TO DRUG THERAPY: ICD-10-CM

## 2024-10-22 DIAGNOSIS — R74.01 TRANSAMINITIS: ICD-10-CM

## 2024-10-22 DIAGNOSIS — C78.6 PERITONEAL CARCINOMATOSIS: ICD-10-CM

## 2024-10-22 DIAGNOSIS — C56.3 OVARIAN CANCER, BILATERAL: ICD-10-CM

## 2024-10-22 DIAGNOSIS — E88.09 HYPOALBUMINEMIA: ICD-10-CM

## 2024-10-22 DIAGNOSIS — Z79.899 IMMUNOCOMPROMISED STATE DUE TO DRUG THERAPY: ICD-10-CM

## 2024-10-22 DIAGNOSIS — Z90.722 S/P BSO (BILATERAL SALPINGO-OOPHORECTOMY): ICD-10-CM

## 2024-10-22 DIAGNOSIS — C78.7 CANCER, METASTATIC TO LIVER: ICD-10-CM

## 2024-10-22 DIAGNOSIS — N17.9 ACUTE RENAL FAILURE SUPERIMPOSED ON STAGE 3A CHRONIC KIDNEY DISEASE, UNSPECIFIED ACUTE RENAL FAILURE TYPE: ICD-10-CM

## 2024-10-22 DIAGNOSIS — Z51.5 ENCOUNTER FOR PALLIATIVE CARE: ICD-10-CM

## 2024-10-22 DIAGNOSIS — Z85.43 H/O OVARIAN CANCER: ICD-10-CM

## 2024-10-22 DIAGNOSIS — Z95.828 H/O INSERTION OF CENTRAL VENOUS ACCESS PORT: ICD-10-CM

## 2024-10-22 DIAGNOSIS — R06.02 SHORTNESS OF BREATH: ICD-10-CM

## 2024-10-22 DIAGNOSIS — K74.69 DECOMPENSATED LIVER DISEASE: ICD-10-CM

## 2024-10-22 DIAGNOSIS — R60.0 BILATERAL LEG EDEMA: ICD-10-CM

## 2024-10-22 DIAGNOSIS — R60.0 BILATERAL LOWER EXTREMITY EDEMA: Primary | ICD-10-CM

## 2024-10-22 DIAGNOSIS — N13.30 HYDRONEPHROSIS OF RIGHT KIDNEY: ICD-10-CM

## 2024-10-22 DIAGNOSIS — N17.9 ACUTE RENAL FAILURE, UNSPECIFIED ACUTE RENAL FAILURE TYPE: Primary | ICD-10-CM

## 2024-10-22 DIAGNOSIS — N18.31 ACUTE RENAL FAILURE SUPERIMPOSED ON STAGE 3A CHRONIC KIDNEY DISEASE, UNSPECIFIED ACUTE RENAL FAILURE TYPE: ICD-10-CM

## 2024-10-22 DIAGNOSIS — R06.00 DYSPNEA: ICD-10-CM

## 2024-10-22 DIAGNOSIS — Z99.81 CHRONIC RESPIRATORY FAILURE WITH HYPOXIA, ON HOME OXYGEN THERAPY: ICD-10-CM

## 2024-10-22 DIAGNOSIS — J96.11 CHRONIC RESPIRATORY FAILURE WITH HYPOXIA: ICD-10-CM

## 2024-10-22 DIAGNOSIS — J96.11 CHRONIC RESPIRATORY FAILURE WITH HYPOXIA, ON HOME OXYGEN THERAPY: ICD-10-CM

## 2024-10-22 PROBLEM — Z86.718 HISTORY OF DVT (DEEP VEIN THROMBOSIS): Status: ACTIVE | Noted: 2024-10-22

## 2024-10-22 PROBLEM — I50.32 CHRONIC DIASTOLIC CHF (CONGESTIVE HEART FAILURE): Status: ACTIVE | Noted: 2024-10-22

## 2024-10-22 PROBLEM — D64.9 NORMOCYTIC ANEMIA: Status: ACTIVE | Noted: 2024-10-22

## 2024-10-22 PROBLEM — R11.2 NAUSEA & VOMITING: Status: ACTIVE | Noted: 2024-10-22

## 2024-10-22 PROBLEM — E86.1 HYPOTENSION DUE TO HYPOVOLEMIA: Status: ACTIVE | Noted: 2024-10-22

## 2024-10-22 LAB
ALBUMIN SERPL BCP-MCNC: 2.3 G/DL (ref 3.5–5.2)
ALBUMIN SERPL BCP-MCNC: 2.5 G/DL (ref 3.5–5.2)
ALP SERPL-CCNC: 1018 U/L (ref 40–150)
ALP SERPL-CCNC: 1122 U/L (ref 40–150)
ALT SERPL W/O P-5'-P-CCNC: 247 U/L (ref 10–44)
ALT SERPL W/O P-5'-P-CCNC: 270 U/L (ref 10–44)
ANION GAP SERPL CALC-SCNC: 10 MMOL/L (ref 8–16)
ANION GAP SERPL CALC-SCNC: 10 MMOL/L (ref 8–16)
APAP SERPL-MCNC: <3 UG/ML (ref 10–20)
APTT PPP: 64.7 SEC (ref 21–32)
AST SERPL-CCNC: 720 U/L (ref 10–40)
AST SERPL-CCNC: 774 U/L (ref 10–40)
BACTERIA #/AREA URNS HPF: ABNORMAL /HPF
BASOPHILS # BLD AUTO: 0.04 K/UL (ref 0–0.2)
BASOPHILS # BLD AUTO: 0.04 K/UL (ref 0–0.2)
BASOPHILS NFR BLD: 0.9 % (ref 0–1.9)
BASOPHILS NFR BLD: 1 % (ref 0–1.9)
BILIRUB SERPL-MCNC: 1.2 MG/DL (ref 0.1–1)
BILIRUB SERPL-MCNC: 1.3 MG/DL (ref 0.1–1)
BILIRUB UR QL STRIP: NEGATIVE
BNP SERPL-MCNC: 27 PG/ML (ref 0–99)
BUN SERPL-MCNC: 80 MG/DL (ref 8–23)
BUN SERPL-MCNC: 84 MG/DL (ref 8–23)
CALCIUM SERPL-MCNC: 8.3 MG/DL (ref 8.7–10.5)
CALCIUM SERPL-MCNC: 9.2 MG/DL (ref 8.7–10.5)
CHLORIDE SERPL-SCNC: 108 MMOL/L (ref 95–110)
CHLORIDE SERPL-SCNC: 110 MMOL/L (ref 95–110)
CK SERPL-CCNC: 169 U/L (ref 20–180)
CLARITY UR: ABNORMAL
CO2 SERPL-SCNC: 19 MMOL/L (ref 23–29)
CO2 SERPL-SCNC: 22 MMOL/L (ref 23–29)
COLOR UR: YELLOW
CREAT SERPL-MCNC: 2.4 MG/DL (ref 0.5–1.4)
CREAT SERPL-MCNC: 2.6 MG/DL (ref 0.5–1.4)
DIFFERENTIAL METHOD BLD: ABNORMAL
DIFFERENTIAL METHOD BLD: ABNORMAL
EOSINOPHIL # BLD AUTO: 0 K/UL (ref 0–0.5)
EOSINOPHIL # BLD AUTO: 0.1 K/UL (ref 0–0.5)
EOSINOPHIL NFR BLD: 0.7 % (ref 0–8)
EOSINOPHIL NFR BLD: 2 % (ref 0–8)
ERYTHROCYTE [DISTWIDTH] IN BLOOD BY AUTOMATED COUNT: 19.3 % (ref 11.5–14.5)
ERYTHROCYTE [DISTWIDTH] IN BLOOD BY AUTOMATED COUNT: 19.8 % (ref 11.5–14.5)
EST. GFR  (NO RACE VARIABLE): 19 ML/MIN/1.73 M^2
EST. GFR  (NO RACE VARIABLE): 21 ML/MIN/1.73 M^2
GLUCOSE SERPL-MCNC: 79 MG/DL (ref 70–110)
GLUCOSE SERPL-MCNC: 85 MG/DL (ref 70–110)
GLUCOSE UR QL STRIP: NEGATIVE
HCT VFR BLD AUTO: 33.8 % (ref 37–48.5)
HCT VFR BLD AUTO: 41.7 % (ref 37–48.5)
HGB BLD-MCNC: 10.6 G/DL (ref 12–16)
HGB BLD-MCNC: 12.7 G/DL (ref 12–16)
HGB UR QL STRIP: ABNORMAL
HYALINE CASTS #/AREA URNS LPF: 8 /LPF
IMM GRANULOCYTES # BLD AUTO: 0.01 K/UL (ref 0–0.04)
IMM GRANULOCYTES # BLD AUTO: 0.01 K/UL (ref 0–0.04)
IMM GRANULOCYTES NFR BLD AUTO: 0.2 % (ref 0–0.5)
IMM GRANULOCYTES NFR BLD AUTO: 0.2 % (ref 0–0.5)
INR PPP: 1.2 (ref 0.8–1.2)
KETONES UR QL STRIP: NEGATIVE
LACTATE SERPL-SCNC: 1 MMOL/L (ref 0.5–2.2)
LACTATE SERPL-SCNC: 1 MMOL/L (ref 0.5–2.2)
LEUKOCYTE ESTERASE UR QL STRIP: NEGATIVE
LYMPHOCYTES # BLD AUTO: 0.6 K/UL (ref 1–4.8)
LYMPHOCYTES # BLD AUTO: 0.8 K/UL (ref 1–4.8)
LYMPHOCYTES NFR BLD: 14.4 % (ref 18–48)
LYMPHOCYTES NFR BLD: 17 % (ref 18–48)
MAGNESIUM SERPL-MCNC: 2.4 MG/DL (ref 1.6–2.6)
MAGNESIUM SERPL-MCNC: 2.5 MG/DL (ref 1.6–2.6)
MCH RBC QN AUTO: 27.5 PG (ref 27–31)
MCH RBC QN AUTO: 27.7 PG (ref 27–31)
MCHC RBC AUTO-ENTMCNC: 30.5 G/DL (ref 32–36)
MCHC RBC AUTO-ENTMCNC: 31.4 G/DL (ref 32–36)
MCV RBC AUTO: 89 FL (ref 82–98)
MCV RBC AUTO: 90 FL (ref 82–98)
MICROSCOPIC COMMENT: ABNORMAL
MONOCYTES # BLD AUTO: 0.3 K/UL (ref 0.3–1)
MONOCYTES # BLD AUTO: 0.4 K/UL (ref 0.3–1)
MONOCYTES NFR BLD: 7.8 % (ref 4–15)
MONOCYTES NFR BLD: 8.4 % (ref 4–15)
NEUTROPHILS # BLD AUTO: 3.1 K/UL (ref 1.8–7.7)
NEUTROPHILS # BLD AUTO: 3.2 K/UL (ref 1.8–7.7)
NEUTROPHILS NFR BLD: 71.5 % (ref 38–73)
NEUTROPHILS NFR BLD: 75.9 % (ref 38–73)
NITRITE UR QL STRIP: NEGATIVE
NRBC BLD-RTO: 0 /100 WBC
NRBC BLD-RTO: 0 /100 WBC
PH UR STRIP: 5 [PH] (ref 5–8)
PHOSPHATE SERPL-MCNC: 4.5 MG/DL (ref 2.7–4.5)
PLATELET # BLD AUTO: 211 K/UL (ref 150–450)
PLATELET # BLD AUTO: 257 K/UL (ref 150–450)
PMV BLD AUTO: 10.3 FL (ref 9.2–12.9)
PMV BLD AUTO: 9.9 FL (ref 9.2–12.9)
POTASSIUM SERPL-SCNC: 4.4 MMOL/L (ref 3.5–5.1)
POTASSIUM SERPL-SCNC: 4.9 MMOL/L (ref 3.5–5.1)
PROCALCITONIN SERPL IA-MCNC: 3.18 NG/ML
PROT SERPL-MCNC: 5.9 G/DL (ref 6–8.4)
PROT SERPL-MCNC: 6.5 G/DL (ref 6–8.4)
PROT UR QL STRIP: ABNORMAL
PROTHROMBIN TIME: 13.5 SEC (ref 9–12.5)
RBC # BLD AUTO: 3.82 M/UL (ref 4–5.4)
RBC # BLD AUTO: 4.62 M/UL (ref 4–5.4)
RBC #/AREA URNS HPF: >100 /HPF (ref 0–4)
SALICYLATES SERPL-MCNC: <5 MG/DL (ref 15–30)
SODIUM SERPL-SCNC: 139 MMOL/L (ref 136–145)
SODIUM SERPL-SCNC: 140 MMOL/L (ref 136–145)
SP GR UR STRIP: 1.02 (ref 1–1.03)
SQUAMOUS #/AREA URNS HPF: 1 /HPF
TROPONIN I SERPL DL<=0.01 NG/ML-MCNC: <0.006 NG/ML (ref 0–0.03)
URN SPEC COLLECT METH UR: ABNORMAL
UROBILINOGEN UR STRIP-ACNC: ABNORMAL EU/DL
WBC # BLD AUTO: 4.1 K/UL (ref 3.9–12.7)
WBC # BLD AUTO: 4.41 K/UL (ref 3.9–12.7)
WBC #/AREA URNS HPF: 5 /HPF (ref 0–5)

## 2024-10-22 PROCEDURE — 85025 COMPLETE CBC W/AUTO DIFF WBC: CPT | Mod: HCNC | Performed by: INTERNAL MEDICINE

## 2024-10-22 PROCEDURE — P9047 ALBUMIN (HUMAN), 25%, 50ML: HCPCS | Mod: JZ,JG,HCNC | Performed by: EMERGENCY MEDICINE

## 2024-10-22 PROCEDURE — 83605 ASSAY OF LACTIC ACID: CPT | Mod: 91,HCNC | Performed by: INTERNAL MEDICINE

## 2024-10-22 PROCEDURE — 1101F PT FALLS ASSESS-DOCD LE1/YR: CPT | Mod: HCNC,CPTII,S$GLB, | Performed by: INTERNAL MEDICINE

## 2024-10-22 PROCEDURE — 83735 ASSAY OF MAGNESIUM: CPT | Mod: 91,HCNC | Performed by: EMERGENCY MEDICINE

## 2024-10-22 PROCEDURE — 94761 N-INVAS EAR/PLS OXIMETRY MLT: CPT | Mod: HCNC

## 2024-10-22 PROCEDURE — 85025 COMPLETE CBC W/AUTO DIFF WBC: CPT | Mod: 91,HCNC | Performed by: EMERGENCY MEDICINE

## 2024-10-22 PROCEDURE — 80053 COMPREHEN METABOLIC PANEL: CPT | Mod: HCNC | Performed by: INTERNAL MEDICINE

## 2024-10-22 PROCEDURE — 25000003 PHARM REV CODE 250: Mod: HCNC | Performed by: INTERNAL MEDICINE

## 2024-10-22 PROCEDURE — 85730 THROMBOPLASTIN TIME PARTIAL: CPT | Mod: HCNC | Performed by: EMERGENCY MEDICINE

## 2024-10-22 PROCEDURE — 84484 ASSAY OF TROPONIN QUANT: CPT | Mod: HCNC | Performed by: EMERGENCY MEDICINE

## 2024-10-22 PROCEDURE — 99999 PR PBB SHADOW E&M-EST. PATIENT-LVL IV: CPT | Mod: PBBFAC,HCNC,, | Performed by: INTERNAL MEDICINE

## 2024-10-22 PROCEDURE — 3288F FALL RISK ASSESSMENT DOCD: CPT | Mod: HCNC,CPTII,S$GLB, | Performed by: INTERNAL MEDICINE

## 2024-10-22 PROCEDURE — 1111F DSCHRG MED/CURRENT MED MERGE: CPT | Mod: HCNC,CPTII,S$GLB, | Performed by: INTERNAL MEDICINE

## 2024-10-22 PROCEDURE — 51798 US URINE CAPACITY MEASURE: CPT | Mod: HCNC

## 2024-10-22 PROCEDURE — 80053 COMPREHEN METABOLIC PANEL: CPT | Mod: 91,HCNC | Performed by: EMERGENCY MEDICINE

## 2024-10-22 PROCEDURE — 11000001 HC ACUTE MED/SURG PRIVATE ROOM: Mod: HCNC

## 2024-10-22 PROCEDURE — 82550 ASSAY OF CK (CPK): CPT | Mod: HCNC | Performed by: INTERNAL MEDICINE

## 2024-10-22 PROCEDURE — 3008F BODY MASS INDEX DOCD: CPT | Mod: HCNC,CPTII,S$GLB, | Performed by: INTERNAL MEDICINE

## 2024-10-22 PROCEDURE — 84443 ASSAY THYROID STIM HORMONE: CPT | Mod: HCNC | Performed by: INTERNAL MEDICINE

## 2024-10-22 PROCEDURE — 84100 ASSAY OF PHOSPHORUS: CPT | Mod: HCNC | Performed by: INTERNAL MEDICINE

## 2024-10-22 PROCEDURE — 80074 ACUTE HEPATITIS PANEL: CPT | Mod: HCNC | Performed by: INTERNAL MEDICINE

## 2024-10-22 PROCEDURE — 84145 PROCALCITONIN (PCT): CPT | Mod: HCNC | Performed by: INTERNAL MEDICINE

## 2024-10-22 PROCEDURE — 63600175 PHARM REV CODE 636 W HCPCS: Mod: JZ,JG,HCNC | Performed by: EMERGENCY MEDICINE

## 2024-10-22 PROCEDURE — 96365 THER/PROPH/DIAG IV INF INIT: CPT | Mod: HCNC

## 2024-10-22 PROCEDURE — 25000003 PHARM REV CODE 250: Mod: HCNC | Performed by: EMERGENCY MEDICINE

## 2024-10-22 PROCEDURE — 99215 OFFICE O/P EST HI 40 MIN: CPT | Mod: HCNC,S$GLB,, | Performed by: INTERNAL MEDICINE

## 2024-10-22 PROCEDURE — 1125F AMNT PAIN NOTED PAIN PRSNT: CPT | Mod: HCNC,CPTII,S$GLB, | Performed by: INTERNAL MEDICINE

## 2024-10-22 PROCEDURE — 84439 ASSAY OF FREE THYROXINE: CPT | Mod: HCNC | Performed by: INTERNAL MEDICINE

## 2024-10-22 PROCEDURE — 3074F SYST BP LT 130 MM HG: CPT | Mod: HCNC,CPTII,S$GLB, | Performed by: INTERNAL MEDICINE

## 2024-10-22 PROCEDURE — 93010 ELECTROCARDIOGRAM REPORT: CPT | Mod: HCNC,,, | Performed by: INTERNAL MEDICINE

## 2024-10-22 PROCEDURE — 80143 DRUG ASSAY ACETAMINOPHEN: CPT | Mod: HCNC | Performed by: INTERNAL MEDICINE

## 2024-10-22 PROCEDURE — 93005 ELECTROCARDIOGRAM TRACING: CPT | Mod: HCNC

## 2024-10-22 PROCEDURE — 36415 COLL VENOUS BLD VENIPUNCTURE: CPT | Mod: HCNC | Performed by: INTERNAL MEDICINE

## 2024-10-22 PROCEDURE — 83880 ASSAY OF NATRIURETIC PEPTIDE: CPT | Mod: HCNC | Performed by: EMERGENCY MEDICINE

## 2024-10-22 PROCEDURE — 99900035 HC TECH TIME PER 15 MIN (STAT): Mod: HCNC

## 2024-10-22 PROCEDURE — 81000 URINALYSIS NONAUTO W/SCOPE: CPT | Mod: HCNC | Performed by: EMERGENCY MEDICINE

## 2024-10-22 PROCEDURE — 21400001 HC TELEMETRY ROOM: Mod: HCNC

## 2024-10-22 PROCEDURE — 85610 PROTHROMBIN TIME: CPT | Mod: HCNC | Performed by: EMERGENCY MEDICINE

## 2024-10-22 PROCEDURE — 80179 DRUG ASSAY SALICYLATE: CPT | Mod: HCNC | Performed by: INTERNAL MEDICINE

## 2024-10-22 PROCEDURE — 83605 ASSAY OF LACTIC ACID: CPT | Mod: HCNC | Performed by: EMERGENCY MEDICINE

## 2024-10-22 PROCEDURE — 83735 ASSAY OF MAGNESIUM: CPT | Mod: HCNC | Performed by: INTERNAL MEDICINE

## 2024-10-22 PROCEDURE — 5A09357 ASSISTANCE WITH RESPIRATORY VENTILATION, LESS THAN 24 CONSECUTIVE HOURS, CONTINUOUS POSITIVE AIRWAY PRESSURE: ICD-10-PCS | Performed by: STUDENT IN AN ORGANIZED HEALTH CARE EDUCATION/TRAINING PROGRAM

## 2024-10-22 PROCEDURE — 3078F DIAST BP <80 MM HG: CPT | Mod: HCNC,CPTII,S$GLB, | Performed by: INTERNAL MEDICINE

## 2024-10-22 PROCEDURE — 99291 CRITICAL CARE FIRST HOUR: CPT | Mod: HCNC

## 2024-10-22 PROCEDURE — 27000221 HC OXYGEN, UP TO 24 HOURS: Mod: HCNC

## 2024-10-22 RX ORDER — FLUTICASONE PROPIONATE 50 MCG
1 SPRAY, SUSPENSION (ML) NASAL EVERY 12 HOURS PRN
Status: DISCONTINUED | OUTPATIENT
Start: 2024-10-22 | End: 2024-10-30 | Stop reason: HOSPADM

## 2024-10-22 RX ORDER — DOCUSATE SODIUM 100 MG
600 CAPSULE ORAL
Status: DISCONTINUED | OUTPATIENT
Start: 2024-10-22 | End: 2024-10-30 | Stop reason: HOSPADM

## 2024-10-22 RX ORDER — ONDANSETRON HYDROCHLORIDE 2 MG/ML
4 INJECTION, SOLUTION INTRAVENOUS EVERY 6 HOURS PRN
Status: DISCONTINUED | OUTPATIENT
Start: 2024-10-22 | End: 2024-10-30 | Stop reason: HOSPADM

## 2024-10-22 RX ORDER — HYDRALAZINE HYDROCHLORIDE 20 MG/ML
10 INJECTION INTRAMUSCULAR; INTRAVENOUS EVERY 6 HOURS PRN
Status: DISCONTINUED | OUTPATIENT
Start: 2024-10-22 | End: 2024-10-30 | Stop reason: HOSPADM

## 2024-10-22 RX ORDER — NALOXONE HCL 0.4 MG/ML
0.02 VIAL (ML) INJECTION
Status: DISCONTINUED | OUTPATIENT
Start: 2024-10-22 | End: 2024-10-30 | Stop reason: HOSPADM

## 2024-10-22 RX ORDER — GLUCAGON 1 MG
1 KIT INJECTION
Status: DISCONTINUED | OUTPATIENT
Start: 2024-10-22 | End: 2024-10-30 | Stop reason: HOSPADM

## 2024-10-22 RX ORDER — ALBUMIN HUMAN 250 G/1000ML
25 SOLUTION INTRAVENOUS ONCE
Status: DISCONTINUED | OUTPATIENT
Start: 2024-10-22 | End: 2024-10-22

## 2024-10-22 RX ORDER — SERTRALINE HYDROCHLORIDE 25 MG/1
25 TABLET, FILM COATED ORAL DAILY
Status: DISCONTINUED | OUTPATIENT
Start: 2024-10-23 | End: 2024-10-30 | Stop reason: HOSPADM

## 2024-10-22 RX ORDER — ALBUMIN HUMAN 250 G/1000ML
25 SOLUTION INTRAVENOUS ONCE
Status: COMPLETED | OUTPATIENT
Start: 2024-10-22 | End: 2024-10-22

## 2024-10-22 RX ORDER — ALBUTEROL SULFATE 0.83 MG/ML
2.5 SOLUTION RESPIRATORY (INHALATION) EVERY 4 HOURS PRN
Status: DISCONTINUED | OUTPATIENT
Start: 2024-10-22 | End: 2024-10-30 | Stop reason: HOSPADM

## 2024-10-22 RX ORDER — ENOXAPARIN SODIUM 150 MG/ML
1 INJECTION SUBCUTANEOUS EVERY 24 HOURS
Status: DISCONTINUED | OUTPATIENT
Start: 2024-10-23 | End: 2024-10-24

## 2024-10-22 RX ORDER — IBUPROFEN 200 MG
24 TABLET ORAL
Status: DISCONTINUED | OUTPATIENT
Start: 2024-10-22 | End: 2024-10-30 | Stop reason: HOSPADM

## 2024-10-22 RX ORDER — SODIUM CHLORIDE 0.9 % (FLUSH) 0.9 %
10 SYRINGE (ML) INJECTION EVERY 12 HOURS PRN
Status: DISCONTINUED | OUTPATIENT
Start: 2024-10-22 | End: 2024-10-30 | Stop reason: HOSPADM

## 2024-10-22 RX ORDER — IBUPROFEN 200 MG
16 TABLET ORAL
Status: DISCONTINUED | OUTPATIENT
Start: 2024-10-22 | End: 2024-10-30 | Stop reason: HOSPADM

## 2024-10-22 RX ORDER — ALPRAZOLAM 0.25 MG/1
0.25 TABLET ORAL 3 TIMES DAILY PRN
Status: DISCONTINUED | OUTPATIENT
Start: 2024-10-22 | End: 2024-10-30 | Stop reason: HOSPADM

## 2024-10-22 RX ORDER — MIDODRINE HYDROCHLORIDE 5 MG/1
10 TABLET ORAL ONCE
Status: COMPLETED | OUTPATIENT
Start: 2024-10-22 | End: 2024-10-22

## 2024-10-22 RX ORDER — OXYCODONE HYDROCHLORIDE 5 MG/1
5 TABLET ORAL EVERY 6 HOURS PRN
Status: DISCONTINUED | OUTPATIENT
Start: 2024-10-22 | End: 2024-10-30 | Stop reason: HOSPADM

## 2024-10-22 RX ORDER — ALBUTEROL SULFATE 90 UG/1
2 INHALANT RESPIRATORY (INHALATION) EVERY 4 HOURS PRN
Status: DISCONTINUED | OUTPATIENT
Start: 2024-10-22 | End: 2024-10-22

## 2024-10-22 RX ADMIN — Medication 600 ML: at 09:10

## 2024-10-22 RX ADMIN — ALBUMIN (HUMAN) 25 G: 5 SOLUTION INTRAVENOUS at 06:10

## 2024-10-22 RX ADMIN — MIDODRINE HYDROCHLORIDE 10 MG: 5 TABLET ORAL at 06:10

## 2024-10-22 NOTE — ED NOTES
Pt resting in ER stretcher, aaox4, rr e/u, NAD noted. Pt remains on cardiac monitor with vss noted. Bed low and locked, call light in reach, side rails up x2. Albumin infusing.  Sister at the bedside.  Pt verbalized understanding of status and POC; denies further needs. Will continue to monitor.

## 2024-10-22 NOTE — TELEPHONE ENCOUNTER
----- Message from Torri House MD sent at 10/22/2024 12:43 PM CDT -----  Regarding: Please Call  Hey! I just got her CMP back and she has a severe MIRELA and acute liver injury.  She needs to go to the hospital for fluid resuscitation.  ----- Message -----  From: Bhargav, [facility]  Sent: 10/22/2024  10:33 AM CDT  To: Torri House MD

## 2024-10-22 NOTE — TELEPHONE ENCOUNTER
Nurse placed a call to patient regarding abnormal lab results. Patient instructed to go to the ER for evaluation per Dr. House. Patient verbalized all understanding. Call ended well.

## 2024-10-22 NOTE — ED PROVIDER NOTES
SCRIBE #1 NOTE: I, Soham Carrillo, am scribing for, and in the presence of, Chrissie Allen MD. I have scribed the entire note.       History     Chief Complaint   Patient presents with    Abnormal Lab     Pt sent by Dr. House for further workup d/t elevated ALP, AST, ALT, and creatinine. Pt has swelling and pain to bilateral legs. Pt does not do dialysis. (+) SOB; pt wears 4L o2 at home. Pt reports normally wearing 3L o2 but changed to 4L today. Pt's BP earlier today was 88/58. (-) chest pain, fever, chills, N/V/D     Review of patient's allergies indicates:  No Known Allergies      History of Present Illness     HPI    10/22/2024, 5:05 PM  History obtained from the patient and family member      History of Present Illness: Casie Gaines is a 68 y.o. female patient with a PMHx of HTN, CHF, arthritis, anemia, HLD, and stage IV ovarian cancer who presents to the Emergency Department for evaluation of abnormal labs and low blood pressure. Pt was sent by Dr House for further workup with elevated ALP, AST, ALT, and creatinine as well as having low blood pressure in clinic. Symptoms are constant and moderate in severity. No mitigating or exacerbating factors reported. Associated sxs include bilateral leg swelling, appetite change, and SOB (worse than baseline). Patient denies any CP, fever, and all other sxs at this time. No prior Tx reported. Pt does not do dialysis. Pt wears 4L of O2 at home. Pt normally takes blood pressure medications but was advised to stop after having low blood pressure today. Pt is still receiving chemo, last receiving treatment last Tuesday. Pt had a PET Scan on 10/11, which shows she has extensive disease progression with liver mets worsening, peritoneal carcinomatosis likely contributing to her declining liver function, and MIRELA. No further complaints or concerns at this time.       Arrival mode: Personal Transportation    PCP: Rossana Ang MD        Past Medical History:  Past  Medical History:   Diagnosis Date    Anemia     Anxiety     Arthritis     knees    Cancer     ovarian    CHF (congestive heart failure)     Hx antineoplastic chemotherapy     last 2019    Hyperlipemia     Hypertension     Neck pain     Ovarian cancer 2019    CHEMO    Peritoneal carcinomatosis 2019       Past Surgical History:  Past Surgical History:   Procedure Laterality Date    breast reduction  10/02/2018    CATARACT EXTRACTION Bilateral     2023     SECTION      X 1    COLONOSCOPY N/A 2021    Procedure: COLONOSCOPY;  Surgeon: Paulette Rojas MD;  Location: [facility] ENDO;  Service: Gastroenterology;  Laterality: N/A;    CYSTOSCOPY W/ URETERAL STENT PLACEMENT Right 2019    Procedure: CYSTOSCOPY, WITH URETERAL STENT INSERTION;  Surgeon: Timmy Santiago IV, MD;  Location: [facility] OR;  Service: Urology;  Laterality: Right;    CYSTOSCOPY W/ URETERAL STENT REMOVAL  10/04/2019    DILATION AND CURETTAGE OF UTERUS      HYSTERECTOMY      RALH for fibroids (still has ovaries)    INSERTION OF VENOUS ACCESS PORT Left 2019    Procedure: INSERTION, VENOUS ACCESS PORT;  Surgeon: Ulisses Monzon MD;  Location: [facility] OR;  Service: General;  Laterality: Left;  Left internal jugular     LYSIS OF ADHESIONS OF URETER N/A 08/15/2019    Procedure: URETEROLYSIS;  Surgeon: Ismael Juarez MD;  Location: [facility] OR;  Service: OB/GYN;  Laterality: N/A;    OMENTECTOMY N/A 08/15/2019    Procedure: OMENTECTOMY;  Surgeon: Ismael Juarez MD;  Location: [facility] OR;  Service: OB/GYN;  Laterality: N/A;    OOPHORECTOMY      RETROGRADE PYELOGRAPHY Right 2019    Procedure: PYELOGRAM, RETROGRADE;  Surgeon: Timmy Santiago IV, MD;  Location: [facility] OR;  Service: Urology;  Laterality: Right;    ROBOT-ASSISTED LAPAROSCOPIC SALPINGO-OOPHORECTOMY USING DA TIA XI Bilateral 08/15/2019    Procedure: XI ROBOTIC SALPINGO-OOPHORECTOMY;  Surgeon: Ismael Juarez MD;  Location: [facility];  Service: OB/GYN;  Laterality:  Bilateral;    TOTAL REDUCTION MAMMOPLASTY  2018    TUBAL LIGATION           Family History:  Family History   Problem Relation Name Age of Onset    Glaucoma Mother      No Known Problems Father      Colon cancer Brother      Breast cancer Maternal Aunt      Breast cancer Paternal Aunt      Ovarian cancer Paternal Aunt      Anesthesia problems Other cousin     Thrombophilia Neg Hx         Social History:  Social History     Tobacco Use    Smoking status: Former     Current packs/day: 0.00     Average packs/day: 1 pack/day for 25.0 years (25.0 ttl pk-yrs)     Types: Cigarettes     Start date: 10/1/1993     Quit date: 10/1/2018     Years since quittin.0    Smokeless tobacco: Never    Tobacco comments:     States started quit 2 months ago after 30 years   Substance and Sexual Activity    Alcohol use: Yes     Comment: occasionally  No alcohol 72h prior to sx    Drug use: No    Sexual activity: Not Currently     Partners: Male     Comment: hyst; mut monog        Review of Systems     Review of Systems   Constitutional:  Positive for appetite change. Negative for chills and fever.   HENT:  Negative for sore throat.    Respiratory:  Positive for shortness of breath (worse than baseline).    Cardiovascular:  Positive for leg swelling (bilateral). Negative for chest pain.   Gastrointestinal:  Negative for nausea.   Genitourinary:  Negative for dysuria.   Musculoskeletal:  Negative for back pain.   Skin:  Negative for rash.   Neurological:  Negative for weakness.   Hematological:  Does not bruise/bleed easily.   All other systems reviewed and are negative.       Physical Exam     Initial Vitals [10/22/24 1518]   BP Pulse Resp Temp SpO2   (S) (!) 95/56 76 18 98.3 °F (36.8 °C) 98 %      MAP       --          Physical Exam  Nursing Notes and Vital Signs Reviewed.  Constitutional: Patient is in no acute distress. Chronically ill appearing.   Head: Atraumatic. Normocephalic.  Eyes: PERRL. EOM intact. Conjunctivae are not pale.  No scleral icterus.  ENT: Mucous membranes are moist. Oropharynx is clear and symmetric.    Neck: Supple. Full ROM. No lymphadenopathy.  Cardiovascular: Regular rate. Regular rhythm. No murmurs, rubs, or gallops. Distal pulses are 2+ and symmetric.  Pulmonary/Chest: No respiratory distress. Clear to auscultation bilaterally. No wheezing or rales. Diminished breath sounds bilaterally.   Abdominal: Soft and non-distended.  There is no tenderness.  No rebound, guarding, or rigidity. Good bowel sounds.  Genitourinary: No CVA tenderness  Musculoskeletal: Moves all extremities. No obvious deformities. 3+ edema to BLE. No calf tenderness.  Skin: Warm and dry.  Neurological:  Alert, awake, and appropriate.  Normal speech.  No acute focal neurological deficits are appreciated.  Psychiatric: Normal affect. Good eye contact. Appropriate in content.     ED Course   Critical Care    Date/Time: 10/22/2024 7:00 PM    Performed by: Chrissie Allen MD  Authorized by: Chrissie Allen MD  Direct patient critical care time: 40 minutes  Additional history critical care time: 10 minutes  Ordering / reviewing critical care time: 8 minutes  Documentation critical care time: 7 minutes  Consulting other physicians critical care time: 6 minutes  Consult with family critical care time: 5 minutes  Total critical care time (exclusive of procedural time) : 76 minutes  Critical care was necessary to treat or prevent imminent or life-threatening deterioration of the following conditions: hepatic failure, circulatory failure, sepsis and metabolic crisis.  Critical care was time spent personally by me on the following activities: blood draw for specimens, development of treatment plan with patient or surrogate, discussions with consultants, discussions with primary provider, interpretation of cardiac output measurements, evaluation of patient's response to treatment, examination of patient, obtaining history from patient or surrogate, ordering  and performing treatments and interventions, ordering and review of laboratory studies, ordering and review of radiographic studies, pulse oximetry, re-evaluation of patient's condition and review of old charts.        ED Vital Signs:  Vitals:    10/23/24 0844 10/23/24 1116 10/23/24 1149 10/23/24 1333   BP:   (!) 99/55    Pulse: 80 80 77 78   Resp:   18    Temp:   97.7 °F (36.5 °C)    TempSrc:   Oral    SpO2:   96%    Weight:       Height:        10/23/24 1510 10/23/24 1556 10/23/24 2000 10/23/24 2051   BP:  (!) 108/55  110/61   Pulse: 76 83 86 84   Resp:  18  18   Temp:  97.7 °F (36.5 °C)  97.5 °F (36.4 °C)   TempSrc:  Oral  Oral   SpO2:  96%  100%   Weight:       Height:        10/23/24 2229 10/23/24 2308 10/24/24 0400 10/24/24 0521   BP:  103/61  (!) 105/56   Pulse: 85 86 82 85   Resp: 18 18  18   Temp:  98.6 °F (37 °C)  98.4 °F (36.9 °C)   TempSrc:  Oral  Oral   SpO2: 100% 100%  100%   Weight:       Height:        10/24/24 0712 10/24/24 0720 10/24/24 0721   BP:  (!) 104/58 110/65   Pulse: 85 85 87   Resp: 18 18    Temp:  98 °F (36.7 °C)    TempSrc:  Oral    SpO2: 100% (!) 91% (!) 93%   Weight:      Height:          Abnormal Lab Results:  Labs Reviewed   CBC W/ AUTO DIFFERENTIAL - Abnormal       Result Value    WBC 4.10      RBC 3.82 (*)     Hemoglobin 10.6 (*)     Hematocrit 33.8 (*)     MCV 89      MCH 27.7      MCHC 31.4 (*)     RDW 19.3 (*)     Platelets 211      MPV 9.9      Immature Granulocytes 0.2      Gran # (ANC) 3.1      Immature Grans (Abs) 0.01      Lymph # 0.6 (*)     Mono # 0.3      Eos # 0.0      Baso # 0.04      nRBC 0      Gran % 75.9 (*)     Lymph % 14.4 (*)     Mono % 7.8      Eosinophil % 0.7      Basophil % 1.0      Differential Method Automated     COMPREHENSIVE METABOLIC PANEL - Abnormal    Sodium 139      Potassium 4.4      Chloride 110      CO2 19 (*)     Glucose 79      BUN 80 (*)     Creatinine 2.4 (*)     Calcium 8.3 (*)     Total Protein 5.9 (*)     Albumin 2.3 (*)     Total  Bilirubin 1.2 (*)     Alkaline Phosphatase 1,018 (*)      (*)      (*)     eGFR 21 (*)     Anion Gap 10     PROTIME-INR - Abnormal    Prothrombin Time 13.5 (*)     INR 1.2     APTT - Abnormal    aPTT 64.7 (*)    URINALYSIS, REFLEX TO URINE CULTURE - Abnormal    Specimen UA Urine, Clean Catch      Color, UA Yellow      Appearance, UA Hazy (*)     pH, UA 5.0      Specific Gravity, UA 1.020      Protein, UA 1+ (*)     Glucose, UA Negative      Ketones, UA Negative      Bilirubin (UA) Negative      Occult Blood UA 2+ (*)     Nitrite, UA Negative      Urobilinogen, UA 2.0-3.0 (*)     Leukocytes, UA Negative      Narrative:     Specimen Source->Urine   URINALYSIS MICROSCOPIC - Abnormal    RBC, UA >100 (*)     WBC, UA 5      Bacteria Rare      Squam Epithel, UA 1      Hyaline Casts, UA 8 (*)     Microscopic Comment SEE COMMENT      Narrative:     Specimen Source->Urine   TROPONIN I    Troponin I <0.006     B-TYPE NATRIURETIC PEPTIDE    BNP 27     MAGNESIUM    Magnesium 2.4     LACTIC ACID, PLASMA    Lactate (Lactic Acid) 1.0          All Lab Results:  Results for orders placed or performed during the hospital encounter of 10/22/24   CBC auto differential    Collection Time: 10/22/24  6:29 PM   Result Value Ref Range    WBC 4.10 3.90 - 12.70 K/uL    RBC 3.82 (L) 4.00 - 5.40 M/uL    Hemoglobin 10.6 (L) 12.0 - 16.0 g/dL    Hematocrit 33.8 (L) 37.0 - 48.5 %    MCV 89 82 - 98 fL    MCH 27.7 27.0 - 31.0 pg    MCHC 31.4 (L) 32.0 - 36.0 g/dL    RDW 19.3 (H) 11.5 - 14.5 %    Platelets 211 150 - 450 K/uL    MPV 9.9 9.2 - 12.9 fL    Immature Granulocytes 0.2 0.0 - 0.5 %    Gran # (ANC) 3.1 1.8 - 7.7 K/uL    Immature Grans (Abs) 0.01 0.00 - 0.04 K/uL    Lymph # 0.6 (L) 1.0 - 4.8 K/uL    Mono # 0.3 0.3 - 1.0 K/uL    Eos # 0.0 0.0 - 0.5 K/uL    Baso # 0.04 0.00 - 0.20 K/uL    nRBC 0 0 /100 WBC    Gran % 75.9 (H) 38.0 - 73.0 %    Lymph % 14.4 (L) 18.0 - 48.0 %    Mono % 7.8 4.0 - 15.0 %    Eosinophil % 0.7 0.0 - 8.0 %     Basophil % 1.0 0.0 - 1.9 %    Differential Method Automated    Comprehensive metabolic panel    Collection Time: 10/22/24  6:29 PM   Result Value Ref Range    Sodium 139 136 - 145 mmol/L    Potassium 4.4 3.5 - 5.1 mmol/L    Chloride 110 95 - 110 mmol/L    CO2 19 (L) 23 - 29 mmol/L    Glucose 79 70 - 110 mg/dL    BUN 80 (H) 8 - 23 mg/dL    Creatinine 2.4 (H) 0.5 - 1.4 mg/dL    Calcium 8.3 (L) 8.7 - 10.5 mg/dL    Total Protein 5.9 (L) 6.0 - 8.4 g/dL    Albumin 2.3 (L) 3.5 - 5.2 g/dL    Total Bilirubin 1.2 (H) 0.1 - 1.0 mg/dL    Alkaline Phosphatase 1,018 (H) 40 - 150 U/L     (H) 10 - 40 U/L     (H) 10 - 44 U/L    eGFR 21 (A) >60 mL/min/1.73 m^2    Anion Gap 10 8 - 16 mmol/L   Troponin I #1    Collection Time: 10/22/24  6:29 PM   Result Value Ref Range    Troponin I <0.006 0.000 - 0.026 ng/mL   BNP    Collection Time: 10/22/24  6:29 PM   Result Value Ref Range    BNP 27 0 - 99 pg/mL   Magnesium    Collection Time: 10/22/24  6:29 PM   Result Value Ref Range    Magnesium 2.4 1.6 - 2.6 mg/dL   Protime-INR    Collection Time: 10/22/24  6:29 PM   Result Value Ref Range    Prothrombin Time 13.5 (H) 9.0 - 12.5 sec    INR 1.2 0.8 - 1.2   APTT    Collection Time: 10/22/24  6:29 PM   Result Value Ref Range    aPTT 64.7 (H) 21.0 - 32.0 sec   Lactic acid, plasma    Collection Time: 10/22/24  6:29 PM   Result Value Ref Range    Lactate (Lactic Acid) 1.0 0.5 - 2.2 mmol/L   EKG 12-lead    Collection Time: 10/22/24  6:39 PM   Result Value Ref Range    QRS Duration 74 ms    [facility] QTC Calculation 448 ms   Urinalysis, Reflex to Urine Culture Urine, Clean Catch    Collection Time: 10/22/24  7:28 PM    Specimen: Urine   Result Value Ref Range    Specimen UA Urine, Clean Catch     Color, UA Yellow Yellow, Straw, Lucero    Appearance, UA Hazy (A) Clear    pH, UA 5.0 5.0 - 8.0    Specific Gravity, UA 1.020 1.005 - 1.030    Protein, UA 1+ (A) Negative    Glucose, UA Negative Negative    Ketones, UA Negative Negative    Bilirubin  (UA) Negative Negative    Occult Blood UA 2+ (A) Negative    Nitrite, UA Negative Negative    Urobilinogen, UA 2.0-3.0 (A) <2.0 EU/dL    Leukocytes, UA Negative Negative   Urinalysis Microscopic    Collection Time: 10/22/24  7:28 PM   Result Value Ref Range    RBC, UA >100 (H) 0 - 4 /hpf    WBC, UA 5 0 - 5 /hpf    Bacteria Rare None-Occ /hpf    Squam Epithel, UA 1 /hpf    Hyaline Casts, UA 8 (A) 0-1/lpf /lpf    Microscopic Comment SEE COMMENT    CK    Collection Time: 10/22/24  9:53 PM   Result Value Ref Range     20 - 180 U/L   TSH    Collection Time: 10/22/24  9:53 PM   Result Value Ref Range    TSH 5.342 (H) 0.400 - 4.000 uIU/mL   T4, free    Collection Time: 10/22/24  9:53 PM   Result Value Ref Range    Free T4 1.01 0.71 - 1.51 ng/dL   Hepatitis panel, acute    Collection Time: 10/22/24  9:53 PM   Result Value Ref Range    Hepatitis B Surface Ag Non-reactive Non-reactive    Hep B C IgM Non-reactive Non-reactive    Hep A IgM Non-reactive Non-reactive    Hepatitis C Ab Non-reactive Non-reactive   Salicylate level    Collection Time: 10/22/24  9:53 PM   Result Value Ref Range    Salicylate Lvl <5.0 (L) 15.0 - 30.0 mg/dL   Acetaminophen level    Collection Time: 10/22/24  9:53 PM   Result Value Ref Range    Acetaminophen (Tylenol), Serum <3.0 (L) 10.0 - 20.0 ug/mL   Lactic Acid, Plasma    Collection Time: 10/22/24  9:53 PM   Result Value Ref Range    Lactate (Lactic Acid) 1.0 0.5 - 2.2 mmol/L   Procalcitonin    Collection Time: 10/22/24  9:53 PM   Result Value Ref Range    Procalcitonin 3.18 (H) <0.25 ng/mL   Lactic Acid, Plasma    Collection Time: 10/22/24 11:59 PM   Result Value Ref Range    Lactate (Lactic Acid) 0.9 0.5 - 2.2 mmol/L   CBC Auto Differential    Collection Time: 10/23/24  4:11 AM   Result Value Ref Range    WBC 3.56 (L) 3.90 - 12.70 K/uL    RBC 3.85 (L) 4.00 - 5.40 M/uL    Hemoglobin 10.6 (L) 12.0 - 16.0 g/dL    Hematocrit 33.8 (L) 37.0 - 48.5 %    MCV 88 82 - 98 fL    MCH 27.5 27.0 - 31.0 pg     MCHC 31.4 (L) 32.0 - 36.0 g/dL    RDW 19.3 (H) 11.5 - 14.5 %    Platelets 220 150 - 450 K/uL    MPV 9.9 9.2 - 12.9 fL    Immature Granulocytes 0.3 0.0 - 0.5 %    Gran # (ANC) 2.4 1.8 - 7.7 K/uL    Immature Grans (Abs) 0.01 0.00 - 0.04 K/uL    Lymph # 0.8 (L) 1.0 - 4.8 K/uL    Mono # 0.3 0.3 - 1.0 K/uL    Eos # 0.0 0.0 - 0.5 K/uL    Baso # 0.04 0.00 - 0.20 K/uL    nRBC 0 0 /100 WBC    Gran % 67.4 38.0 - 73.0 %    Lymph % 22.8 18.0 - 48.0 %    Mono % 7.3 4.0 - 15.0 %    Eosinophil % 1.1 0.0 - 8.0 %    Basophil % 1.1 0.0 - 1.9 %    Poik Moderate     Ovalocytes Moderate     Target Cells Occasional     Tear Drop Cells Occasional     Kina Cells Occasional     Acanthocytes Present     Large/Giant Platelets Present     Differential Method Automated    Comprehensive metabolic panel    Collection Time: 10/23/24  4:11 AM   Result Value Ref Range    Sodium 140 136 - 145 mmol/L    Potassium 4.6 3.5 - 5.1 mmol/L    Chloride 109 95 - 110 mmol/L    CO2 20 (L) 23 - 29 mmol/L    Glucose 77 70 - 110 mg/dL    BUN 82 (H) 8 - 23 mg/dL    Creatinine 2.3 (H) 0.5 - 1.4 mg/dL    Calcium 8.7 8.7 - 10.5 mg/dL    Total Protein 5.6 (L) 6.0 - 8.4 g/dL    Albumin 2.7 (L) 3.5 - 5.2 g/dL    Total Bilirubin 1.3 (H) 0.1 - 1.0 mg/dL    Alkaline Phosphatase 937 (H) 40 - 150 U/L     (H) 10 - 40 U/L     (H) 10 - 44 U/L    eGFR 22 (A) >60 mL/min/1.73 m^2    Anion Gap 11 8 - 16 mmol/L   Protime-INR    Collection Time: 10/23/24  4:11 AM   Result Value Ref Range    Prothrombin Time 13.9 (H) 9.0 - 12.5 sec    INR 1.3 (H) 0.8 - 1.2     *Note: Due to a large number of results and/or encounters for the requested time period, some results have not been displayed. A complete set of results can be found in Results Review.         Imaging Results:  Imaging Results              US Abdomen Limited (Final result)  Result time 10/23/24 08:32:31      Final result by Chandrakant Fermin MD (10/23/24 08:32:31)                   Impression:      Severe  heterogeneous echotexture throughout the liver with metastatic disease as above.      Electronically signed by: Chandrakant Fermin MD  Date:    10/23/2024  Time:    08:32               Narrative:    EXAMINATION:  US ABDOMEN LIMITED    CLINICAL HISTORY:  worsening elevation in LFTs, MIRELA/CKD3a, abdominal distention, metastatic ovarian cancer;    COMPARISON:  10/05/2024    FINDINGS:  Limited right upper quadrant ultrasound performed.  The liver measures 17.2 cm in length and again demonstrates severely heterogeneous echotexture throughout the liver with poorly defined mass within the left hepatic lobe measuring up to 8.8 cm.  Additional smaller hypoechoic lesions seen throughout concerning for metastatic disease.  Common bile duct is within normal limits at 5 mm..  No gallstones.  Mild wall thickening within the gallbladder which could reflect intrinsic liver disease.  Negative sonographic Back sign.  The visualized portions of the pancreas and IVC are within normal limits.  The right kidney is normal in length measuring 12 cm. Mild to moderate right-sided hydronephrosis.  No definite renal mass seen.  Spleen is normal in size.                                       X-Ray Chest AP Portable (Final result)  Result time 10/22/24 17:30:24      Final result by Partha Miguel MD (10/22/24 17:30:24)                   Impression:      1.  Similar degree of vascular congestion versus reticular interstitial changes throughout the lungs with patchy ground-glass opacities.  Stable small to moderate left effusion.  New small right effusion.  Differential considerations would include chronic or recurrent CHF versus interstitial infectious process such as atypical viral pneumonia.  Clinical correlation is advised.    2.  Stable findings as noted above.      Electronically signed by: Partha Miguel MD  Date:    10/22/2024  Time:    17:30               Narrative:    EXAMINATION:  XR CHEST AP PORTABLE    CLINICAL HISTORY:  Shortness of  breath    COMPARISON:  October 2, 2024    FINDINGS:  EKG leads overlie the chest.  Stable small to moderate left pleural effusion.  New small right pleural effusion.  Similar degree of vascular congestion versus reticular interstitial changes throughout the lungs.  Patchy ground-glass opacities noted as well.  The lungs are free of new pulmonary opacities.  The cardiac silhouette size is on the upper limits of normal.  Stable left-sided chest port.  Tortuous aorta with calcifications of the aortic knob.  There are degenerative changes of the spine and both shoulder girdles.                                       The EKG was ordered, reviewed, and independently interpreted by the ED provider.  Interpretation time: 18:39  Rate: 74 BPM  Rhythm: normal sinus rhythm  Interpretation: Low voltage QRS. No STEMI.           The Emergency Provider reviewed the vital signs and test results, which are outlined above.     ED Discussion     6:13 PM: Discussed pt's case with Dr. House (Hematology) who will meeting with pt's gynecologic oncologist and discuss hospice with pt tomorrow.   .  7:41 PM: Discussed case with Hue Alfred MD (Hospital Medicine). Dr. Alfred agrees with current care and management of pt and accepts admission.   Admitting Service: Hospital Medicine   Admitting Physician: Dr. Alfred  Admit to: inpt tele    7:41 PM: Re-evaluated pt. I have discussed test results, shared treatment plan, and the need for admission with patient and family at bedside. Pt and family express understanding at this time and agree with all information. All questions answered. Pt and family have no further questions or concerns at this time. Pt is ready for admit.           Medical Decision Making  DDX: 1. Liver Failure 2. Hepatorenal Syndrome 3. Sepsis 4. Kidney failure    ECG reviewed no acute ischemic changes noted, CXR shows chronic congestion, wbc normal, h/h stable, MIRELA noted, worsening liver function consistent with metastatic  disease progression, initially hypotensive, improved with albumin, midodrine, no concerns for sepsis, heme/onc consulted patient admitted to [facility], plans for likely hospice.     Amount and/or Complexity of Data Reviewed  Independent Historian: caregiver     Details: Family member at bedside  External Data Reviewed: labs, radiology and notes.     Details: Reviewed recent labs and PET scan showing extensive disease progression with mets to liver and peritoneum   Labs: ordered. Decision-making details documented in ED Course.  Radiology: ordered. Decision-making details documented in ED Course.  ECG/medicine tests: ordered and independent interpretation performed. Decision-making details documented in ED Course.  Discussion of management or test interpretation with external provider(s): Heme/onc    Risk  Prescription drug management.  Decision regarding hospitalization.  Decision not to resuscitate or to de-escalate care because of poor prognosis.  Diagnosis or treatment significantly limited by social determinants of health.                ED Medication(s):  Medications   ALPRAZolam tablet 0.25 mg (has no administration in time range)   alteplase injection 2 mg (has no administration in time range)   fluticasone propionate 50 mcg/actuation nasal spray 50 mcg (has no administration in time range)   sertraline tablet 25 mg (25 mg Oral Given 10/23/24 5499)   sodium chloride 0.9% flush 10 mL (has no administration in time range)   naloxone 0.4 mg/mL injection 0.02 mg (has no administration in time range)   glucose chewable tablet 16 g (has no administration in time range)   glucose chewable tablet 24 g (has no administration in time range)   glucagon (human recombinant) injection 1 mg (has no administration in time range)   enoxaparin injection 105 mg (105 mg Subcutaneous Given 10/23/24 7943)   electrolytes-dextrose (Pedialyte) oral solution 600 mL (600 mLs Oral Not Given 10/24/24 1830)   dextrose 10% bolus 125 mL 125  mL (has no administration in time range)   dextrose 10% bolus 250 mL 250 mL (has no administration in time range)   oxyCODONE immediate release tablet 5 mg (has no administration in time range)   ondansetron injection 4 mg (has no administration in time range)   hydrALAZINE injection 10 mg (has no administration in time range)   albuterol nebulizer solution 2.5 mg (has no administration in time range)   albumin human 25% bottle 25 g (0 g Intravenous Stopped 10/22/24 1928)   midodrine tablet 10 mg (10 mg Oral Given 10/22/24 1830)       Current Discharge Medication List                  Scribe Attestation:   Scribe #1: I performed the above scribed service and the documentation accurately describes the services I performed. I attest to the accuracy of the note.     Attending:   Physician Attestation Statement for Scribe #1: I, Chrissie Allen MD, personally performed the services described in this documentation, as scribed by Soham Carrillo, in my presence, and it is both accurate and complete.           Clinical Impression       ICD-10-CM ICD-9-CM   1. Acute renal failure, unspecified acute renal failure type  N17.9 584.9   2. Shortness of breath  R06.02 786.05   3. Dyspnea  R06.00 786.09   4. H/O ovarian cancer  Z85.43 V10.43   5. Peritoneal carcinomatosis  C78.6 197.6   6. Cancer, metastatic to liver  C78.7 197.7   7. Bilateral leg edema  R60.0 782.3   8. Chronic respiratory failure with hypoxia, on home oxygen therapy  J96.11 518.83    Z99.81 799.02     V46.2   9. Immunocompromised state due to drug therapy  D84.821 V58.69    Z79.899    10. Decompensated liver disease  K74.69 571.5   11. Hypoalbuminemia  E88.09 273.8   12. H/O insertion of central venous access port  Z95.828 V45.89   13. Chest pain  R07.9 786.50       Disposition:   Disposition: Admitted  Condition: Serious       Chrissie Allen MD  10/24/24 0811

## 2024-10-23 ENCOUNTER — TUMOR BOARD CONFERENCE (OUTPATIENT)
Dept: HEMATOLOGY/ONCOLOGY | Facility: CLINIC | Age: 69
End: 2024-10-23
Payer: MEDICARE

## 2024-10-23 LAB
ACANTHOCYTES BLD QL SMEAR: PRESENT
ALBUMIN SERPL BCP-MCNC: 2.7 G/DL (ref 3.5–5.2)
ALP SERPL-CCNC: 937 U/L (ref 40–150)
ALT SERPL W/O P-5'-P-CCNC: 237 U/L (ref 10–44)
ANION GAP SERPL CALC-SCNC: 11 MMOL/L (ref 8–16)
AST SERPL-CCNC: 717 U/L (ref 10–40)
BASOPHILS # BLD AUTO: 0.04 K/UL (ref 0–0.2)
BASOPHILS NFR BLD: 1.1 % (ref 0–1.9)
BILIRUB SERPL-MCNC: 1.3 MG/DL (ref 0.1–1)
BUN SERPL-MCNC: 82 MG/DL (ref 8–23)
BURR CELLS BLD QL SMEAR: ABNORMAL
CALCIUM SERPL-MCNC: 8.7 MG/DL (ref 8.7–10.5)
CHLORIDE SERPL-SCNC: 109 MMOL/L (ref 95–110)
CO2 SERPL-SCNC: 20 MMOL/L (ref 23–29)
CREAT SERPL-MCNC: 2.3 MG/DL (ref 0.5–1.4)
DACRYOCYTES BLD QL SMEAR: ABNORMAL
DIFFERENTIAL METHOD BLD: ABNORMAL
EOSINOPHIL # BLD AUTO: 0 K/UL (ref 0–0.5)
EOSINOPHIL NFR BLD: 1.1 % (ref 0–8)
ERYTHROCYTE [DISTWIDTH] IN BLOOD BY AUTOMATED COUNT: 19.3 % (ref 11.5–14.5)
EST. GFR  (NO RACE VARIABLE): 22 ML/MIN/1.73 M^2
GIANT PLATELETS BLD QL SMEAR: PRESENT
GLUCOSE SERPL-MCNC: 77 MG/DL (ref 70–110)
HAV IGM SERPL QL IA: NORMAL
HBV CORE IGM SERPL QL IA: NORMAL
HBV SURFACE AG SERPL QL IA: NORMAL
HCT VFR BLD AUTO: 33.8 % (ref 37–48.5)
HCV AB SERPL QL IA: NORMAL
HGB BLD-MCNC: 10.6 G/DL (ref 12–16)
IMM GRANULOCYTES # BLD AUTO: 0.01 K/UL (ref 0–0.04)
IMM GRANULOCYTES NFR BLD AUTO: 0.3 % (ref 0–0.5)
INR PPP: 1.3 (ref 0.8–1.2)
LACTATE SERPL-SCNC: 0.9 MMOL/L (ref 0.5–2.2)
LYMPHOCYTES # BLD AUTO: 0.8 K/UL (ref 1–4.8)
LYMPHOCYTES NFR BLD: 22.8 % (ref 18–48)
MCH RBC QN AUTO: 27.5 PG (ref 27–31)
MCHC RBC AUTO-ENTMCNC: 31.4 G/DL (ref 32–36)
MCV RBC AUTO: 88 FL (ref 82–98)
MONOCYTES # BLD AUTO: 0.3 K/UL (ref 0.3–1)
MONOCYTES NFR BLD: 7.3 % (ref 4–15)
NEUTROPHILS # BLD AUTO: 2.4 K/UL (ref 1.8–7.7)
NEUTROPHILS NFR BLD: 67.4 % (ref 38–73)
NRBC BLD-RTO: 0 /100 WBC
OVALOCYTES BLD QL SMEAR: ABNORMAL
PLATELET # BLD AUTO: 220 K/UL (ref 150–450)
PMV BLD AUTO: 9.9 FL (ref 9.2–12.9)
POIKILOCYTOSIS BLD QL SMEAR: ABNORMAL
POTASSIUM SERPL-SCNC: 4.6 MMOL/L (ref 3.5–5.1)
PROT SERPL-MCNC: 5.6 G/DL (ref 6–8.4)
PROTHROMBIN TIME: 13.9 SEC (ref 9–12.5)
RBC # BLD AUTO: 3.85 M/UL (ref 4–5.4)
SODIUM SERPL-SCNC: 140 MMOL/L (ref 136–145)
T4 FREE SERPL-MCNC: 1.01 NG/DL (ref 0.71–1.51)
TARGETS BLD QL SMEAR: ABNORMAL
TSH SERPL DL<=0.005 MIU/L-ACNC: 5.34 UIU/ML (ref 0.4–4)
WBC # BLD AUTO: 3.56 K/UL (ref 3.9–12.7)

## 2024-10-23 PROCEDURE — 63600175 PHARM REV CODE 636 W HCPCS: Mod: HCNC | Performed by: INTERNAL MEDICINE

## 2024-10-23 PROCEDURE — 94799 UNLISTED PULMONARY SVC/PX: CPT | Mod: HCNC,XB

## 2024-10-23 PROCEDURE — 94761 N-INVAS EAR/PLS OXIMETRY MLT: CPT | Mod: HCNC

## 2024-10-23 PROCEDURE — 27000221 HC OXYGEN, UP TO 24 HOURS: Mod: HCNC

## 2024-10-23 PROCEDURE — 99900035 HC TECH TIME PER 15 MIN (STAT): Mod: HCNC

## 2024-10-23 PROCEDURE — 94799 UNLISTED PULMONARY SVC/PX: CPT | Mod: HCNC

## 2024-10-23 PROCEDURE — 85610 PROTHROMBIN TIME: CPT | Mod: HCNC | Performed by: INTERNAL MEDICINE

## 2024-10-23 PROCEDURE — 80053 COMPREHEN METABOLIC PANEL: CPT | Mod: HCNC | Performed by: INTERNAL MEDICINE

## 2024-10-23 PROCEDURE — 99223 1ST HOSP IP/OBS HIGH 75: CPT | Mod: HCNC,,, | Performed by: INTERNAL MEDICINE

## 2024-10-23 PROCEDURE — 85025 COMPLETE CBC W/AUTO DIFF WBC: CPT | Mod: HCNC | Performed by: INTERNAL MEDICINE

## 2024-10-23 PROCEDURE — 21400001 HC TELEMETRY ROOM: Mod: HCNC

## 2024-10-23 PROCEDURE — 25000003 PHARM REV CODE 250: Mod: HCNC | Performed by: INTERNAL MEDICINE

## 2024-10-23 PROCEDURE — 11000001 HC ACUTE MED/SURG PRIVATE ROOM: Mod: HCNC

## 2024-10-23 RX ADMIN — ENOXAPARIN SODIUM 105 MG: 120 INJECTION SUBCUTANEOUS at 04:10

## 2024-10-23 RX ADMIN — SERTRALINE HYDROCHLORIDE 25 MG: 25 TABLET ORAL at 08:10

## 2024-10-23 NOTE — PLAN OF CARE
[facility]  Initial Discharge Assessment       Primary Care Provider: Rossana Ang MD    Admission Diagnosis: Shortness of breath [R06.02]  Hypoalbuminemia [E88.09]  Dyspnea [R06.00]  Cancer, metastatic to liver [C78.7]  Bilateral leg edema [R60.0]  Peritoneal carcinomatosis [C78.6]  Chest pain [R07.9]  H/O ovarian cancer [Z85.43]  Acute renal failure, unspecified acute renal failure type [N17.9]  Decompensated liver disease [K74.69]  Chronic respiratory failure with hypoxia, on home oxygen therapy [J96.11, Z99.81]  Immunocompromised state due to drug therapy [D84.821, Z79.899]  H/O insertion of central venous access port [Z95.828]    Admission Date: 10/22/2024  Expected Discharge Date: per attending    Transition of Care Barriers: None    Payor: HU[employer] MANAGED MEDICARE / Plan: [employer] MEDICARE HMO / Product Type: Capitation /     Extended Emergency Contact Information  Primary Emergency Contact: Cornelio Gaines  Address: [facility] [address] [address] [address] [place]  Home Phone: [phone]  Mobile Phone: [phone]  Relation: Spouse  Secondary Emergency Contact: Prasad Frias/ Tiffanie  Address: [facility] [address] [address] [address] [place]  Home Phone: [phone]  Mobile Phone: [phone]  Relation: Other    Discharge Plan A: Home with family         [facility] [address] [place] [address]  Phone: [phone] Fax: [phone]    [id number]  [place] - [address] AT [place] & [address]  [place] [address]  Phone: [phone] Fax: [phone]    [id number] [place] - [address] AT [facility]  [address]  [facility] [address]  Phone: [phone] Fax: [phone]      Initial Assessment (most recent)       Adult Discharge Assessment - 10/23/24 9557           Discharge Assessment    Assessment Type Discharge Planning Assessment     Confirmed/corrected address, phone number and insurance Yes     Confirmed Demographics Correct on Facesheet     Source of Information patient     When was your last doctors appointment? 10/22/24     Does patient/caregiver understand observation status Yes     Communicated EVE with patient/caregiver Date not available/Unable to determine     Reason For Admission Acute Renal Failure     People in Home spouse     Do you expect to return to your current living situation? Yes     Do you have help at home or someone to help you manage your care at home? Yes     Who are your caregiver(s) and their phone number(s)? Spouse; Cornelio Gaines [phone]     Prior to hospitilization cognitive status: Alert/Oriented     Current cognitive status: Alert/Oriented     Walking or Climbing Stairs Difficulty yes     Walking or Climbing Stairs ambulation difficulty, requires equipment     Mobility Management walker     Dressing/Bathing Difficulty yes     Dressing/Bathing bathing difficulty, requires equipment     Dressing/Bathing Management requesting shower chair     Equipment Currently Used at Home walker, rolling     Readmission within 30 days? No     Patient currently being followed by outpatient case management? Unable to determine (comments)     Do you take prescription medications? Yes     Do you have prescription coverage? Yes     Coverage [employer]     Do you have any problems affording any of your prescribed medications? No     Is the patient taking medications as prescribed? yes     Who is going to help you get home at discharge? Family     How do you get to doctors appointments? family or friend will provide     Are you on dialysis? No     Do you take coumadin? No     Discharge Plan A Home with family     DME Needed Upon Discharge  shower chair     Discharge Plan discussed with: Patient     Transition of Care Barriers None                      SW Intern met  with pt at bedside to discuss treatment and d/c plan. Pt will return home with family upon d/c. Pt stated she has had [facility] in the past. Pt is requesting shower chair upon d/c.

## 2024-10-23 NOTE — ASSESSMENT & PLAN NOTE
Patient has Diastolic (HFpEF) heart failure that is Chronic. On presentation their CHF was well compensated. Most recent BNP and echo results are listed below.  Recent Labs     10/22/24  1829   BNP 27     Latest ECHO  Results for orders placed during the hospital encounter of 09/03/24    Echo    Interpretation Summary    Left Ventricle: The left ventricle is normal in size. Normal wall thickness. There is normal systolic function with a visually estimated ejection fraction of 60 - 65%. Biplane (2D) method of discs ejection fraction is 56%. Global longitudinal strain is -19.5%. There is normal diastolic function.    Right Ventricle: Normal right ventricular cavity size. Wall thickness is normal. Systolic function is normal.    IVC/SVC: Normal venous pressure at 3 mmHg.    Malignant neoplasm of both ovaries    Baseline echo 9/3/2024  NORM -19.47%  Biplane 56%    Current Heart Failure Medications  hydrALAZINE injection 10 mg, Every 6 hours PRN, Intravenous    Plan  - Monitor strict I&Os and daily weights.    - Place on telemetry  - Cardiology has not been consulted  - The patient's volume status is at their baseline

## 2024-10-23 NOTE — CONSULTS
Hematology/Oncology  Consult Note   Admission Date: 10/22/2024  Hospital Length of Stay: 1  Code Status: FULL CODE  Attending Provider:  Anival Hernandez DO  Consulting Provider: Torri Pugh MD  Primary Care Physician: Rossana Ang MD   Principal Problem: Acute renal failure superimposed on stage 3a chronic kidney disease    Reason for Consult: Disease Progression   HPI   Casei Gaines is a 68 y.o. 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  Her treatment was interrupted due to hospitalization for PNA.      On evaluation in clinic yesterday the patient was noted to have acute liver injury as well as MIRELA and was directed to go to the ED for hospital admission.  She was severely dehydrated due to poor p.o. intake.  Today she does appear clinically improved however lab indices still abnormal.  I reviewed her course with her and her son who was on the phone; we discussed the current treatment regimen.  I also broached the subject of hospitalist given her overall disease.    Review of Systems   Constitutional:  Positive for malaise/fatigue. Negative for chills and fever.   Respiratory:  Negative for shortness of breath.    Cardiovascular:  Positive for leg swelling. Negative for chest pain.   Gastrointestinal:  Negative for abdominal pain, constipation, diarrhea, nausea and vomiting.   Musculoskeletal:  Negative for myalgias.   Neurological:  Negative for headaches.   Psychiatric/Behavioral:  The patient is not nervous/anxious.      Objective   Continuous Infusions:  Scheduled Meds:   electrolytes-dextrose  600 mL Oral Q4H    enoxparin  1 mg/kg Subcutaneous Q24H (prophylaxis, 1700)    sertraline  25 mg Oral Daily     PRN Meds:  Current Facility-Administered Medications:     albuterol sulfate, 2.5 mg, Nebulization, Q4H PRN    ALPRAZolam, 0.25 mg, Oral, TID PRN    alteplase, 2 mg, Intra-Catheter, PRN    dextrose  10%, 12.5 g, Intravenous, PRN    dextrose 10%, 25 g, Intravenous, PRN    fluticasone propionate, 1 spray, Each Nostril, Q12H PRN    glucagon (human recombinant), 1 mg, Intramuscular, PRN    glucose, 16 g, Oral, PRN    glucose, 24 g, Oral, PRN    hydrALAZINE, 10 mg, Intravenous, Q6H PRN    naloxone, 0.02 mg, Intravenous, PRN    ondansetron, 4 mg, Intravenous, Q6H PRN    oxyCODONE, 5 mg, Oral, Q6H PRN    sodium chloride 0.9%, 10 mL, Intravenous, Q12H PRN      Vital Signs Range (Last 24H):  Temp:  [97.7 °F (36.5 °C)-98.3 °F (36.8 °C)]   Pulse:  [70-81]   Resp:  [16-23]   BP: ()/(51-63)   SpO2:  [93 %-100 %]     Physical Exam  Constitutional:       General: She is not in acute distress.     Appearance: She is not ill-appearing, toxic-appearing or diaphoretic.   HENT:      Head: Normocephalic and atraumatic.   Cardiovascular:      Rate and Rhythm: Normal rate.   Pulmonary:      Comments: NC oxygen  Musculoskeletal:      Right lower leg: Edema present.      Left lower leg: Edema present.   Neurological:      General: No focal deficit present.      Mental Status: She is alert and oriented to person, place, and time.   Psychiatric:         Mood and Affect: Mood normal.         Behavior: Behavior normal.         Thought Content: Thought content normal.       Laboratory   CBC with Differential:  Recent Labs   Lab 10/23/24  0411   WBC 3.56*   LYMPH 22.8  0.8*   BASOPHIL 1.1   RBC 3.85*   HCT 33.8*   HGB 10.6*   MCV 88   MCH 27.5   RDW 19.3*      MPV 9.9     CMP:  Recent Labs   Lab 10/23/24  0411   GLU 77   CALCIUM 8.7   ALBUMIN 2.7*   PROT 5.6*      K 4.6   CO2 20*      BUN 82*   CREATININE 2.3*   ALKPHOS 937*   *   *   BILITOT 1.3*     BMP:   Recent Labs   Lab 10/23/24  0411   GLU 77   CALCIUM 8.7      K 4.6   CO2 20*      BUN 82*   CREATININE 2.3*     LFTs:   Recent Labs   Lab 10/23/24  0411   *   *   ALKPHOS 937*   BILITOT 1.3*   PROT 5.6*   ALBUMIN 2.7*  "    Haptoglobin: No results for input(s): "HAPTOGLOBIN" in the last 24 hours.  Tumor Markers: No results for input(s): "PSA", "CEA", "", "AFPTM", "AW2799", "" in the last 24 hours.    Invalid input(s): "ALGTM"  Immunology: No results for input(s): "SPEP", "JULISSA", "LOYD", "FREELAMBDALI" in the last 24 hours.  Coagulation:   Recent Labs   Lab 10/22/24  1829 10/23/24  0411   INR 1.2 1.3*   APTT 64.7*  --        Microbiology Results (last 7 days)       ** No results found for the last 168 hours. **            Imaging     Imaging Results              US Abdomen Limited (Final result)  Result time 10/23/24 08:32:31      Final result by Chandrakant Fermin MD (10/23/24 08:32:31)                   Impression:      Severe heterogeneous echotexture throughout the liver with metastatic disease as above.      Electronically signed by: Chandrakant Fermin MD  Date:    10/23/2024  Time:    08:32               Narrative:    EXAMINATION:  US ABDOMEN LIMITED    CLINICAL HISTORY:  worsening elevation in LFTs, MIRELA/CKD3a, abdominal distention, metastatic ovarian cancer;    COMPARISON:  10/05/2024    FINDINGS:  Limited right upper quadrant ultrasound performed.  The liver measures 17.2 cm in length and again demonstrates severely heterogeneous echotexture throughout the liver with poorly defined mass within the left hepatic lobe measuring up to 8.8 cm.  Additional smaller hypoechoic lesions seen throughout concerning for metastatic disease.  Common bile duct is within normal limits at 5 mm..  No gallstones.  Mild wall thickening within the gallbladder which could reflect intrinsic liver disease.  Negative sonographic Back sign.  The visualized portions of the pancreas and IVC are within normal limits.  The right kidney is normal in length measuring 12 cm. Mild to moderate right-sided hydronephrosis.  No definite renal mass seen.  Spleen is normal in size.                                       X-Ray Chest AP Portable (Final result)  " Result time 10/22/24 17:30:24      Final result by Partha Miguel MD (10/22/24 17:30:24)                   Impression:      1.  Similar degree of vascular congestion versus reticular interstitial changes throughout the lungs with patchy ground-glass opacities.  Stable small to moderate left effusion.  New small right effusion.  Differential considerations would include chronic or recurrent CHF versus interstitial infectious process such as atypical viral pneumonia.  Clinical correlation is advised.    2.  Stable findings as noted above.      Electronically signed by: Partha Miguel MD  Date:    10/22/2024  Time:    17:30               Narrative:    EXAMINATION:  XR CHEST AP PORTABLE    CLINICAL HISTORY:  Shortness of breath    COMPARISON:  October 2, 2024    FINDINGS:  EKG leads overlie the chest.  Stable small to moderate left pleural effusion.  New small right pleural effusion.  Similar degree of vascular congestion versus reticular interstitial changes throughout the lungs.  Patchy ground-glass opacities noted as well.  The lungs are free of new pulmonary opacities.  The cardiac silhouette size is on the upper limits of normal.  Stable left-sided chest port.  Tortuous aorta with calcifications of the aortic knob.  There are degenerative changes of the spine and both shoulder girdles.                                      Pathology     Specimen (24h ago, onward)      None            Assessment and Plan:   Recurrent, Stage IV Serous Ovarian Cancer with Peritoneal Carcinomatosis  Tumor NGS/Blood NGS/FRa testing negative  Start Doxorubicn/Avstin 09/06/2024 however C2 delayed 2/2 to hospital admission for PNA 10/02/2024  NM PET-CT 10/11/2024 showed POD which is expected given break in therapy  Will plan for repeat imaging after C3 of Doxorubicin  Screen for clinical trials sent and coordinate with gynecologic oncologist; the treatment options are paclitaxel, topotecan, gemcitabine and none of these are superior to the  other; the median response rate is poor  (10-35%) with a high risk of rapid progression after the initiation of treatment which I believe we are seeing.  I broached the hospice discussion today briefly however will see how she does during this hospitalization as she noted some clinical improvement after resuming chemo     MIRELA on CKD  Likely due to hypovolemia due to poor PO intake and metastatic disease as noted on prior imaging to have  moderate right-sided hydronephrosis and hydroureter with ureter likely being obstructed by peritoneal implants.   Would repeat renal imaging and assess for necessity of ureteral stenting      Elevated Liver Enzymes  Likely due to hypovolemia; if no improvement may be due to hyperprogression of disease but less likely given recent resumption of chemotherapy     LLE  Venous Ultrasound showed no DVT      Hypotension due to hypovolemia   Hold antihypertensives  Management per primary team          Chronic Medical Conditions  Osteoarthritis  HTN  Anxiety      I appreciate the opportunity to participate in this patient's care. Please do not hesitate to contact me with any questions or concerns.     Torri Pugh MD  Hematology/Oncology

## 2024-10-23 NOTE — ASSESSMENT & PLAN NOTE
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 New moderate-sized bilateral pleural effusions as described above with adjacent atelectasis or consolidation.No enlarged mediastinal and hilar lymph nodes. Intralobular septal thickening and associated groundglass opacity bilaterally felt to represent edema.New irregular soft tissue mass in the left upper quadrant concerning for carcinomatosis."  -CT scan of abdomen and pelvis 10/06/2024 with Widespread metastatic disease suspected with abnormal lymph nodes within the retroperitoneum, jovan hepatis, mediastinum as well as multiple abnormal liver lesions and focal thickening within the large bowel. Patient needs tissue sampling of liver lesion. Recommend colonoscopy and possible biopsy of sigmoid lesion."  -nuclear medicine PET scan 10/11/2024 with Overall there has been progression of disease. There is continued hypermetabolic adenopathy within the neck, chest (mediastinum and chest wall).  Some of these areas are stable with some nodes slightly diminished and some decreased degrees of FDG uptake within some of these nodes. However, there is extensive progression of peritoneal carcinomatosis throughout the abdomen and pelvis with increasing number and size of peritoneal hypermetabolic nodularity and increasing amount of ascites.  There is also large amount heterogeneous FDG uptake within the left hepatic lobe concerning for hepatic metastatic disease which is new. there has also been interval development moderate right-sided hydronephrosis and hydroureter with ureter likely being obstructed by peritoneal implants. increasing small to moderate bilateral loculated pleural effusions.  Extensive mixed interstitial and ground-glass infiltrates throughout the lungs.  Question " "pneumonia/post treatment related changes.  Lymphangitic carcinomatosis could appear similar"  -hematology oncology consult for possible palliative care/hospice discussions; if patient is not amenable to this, she will need additional consultations to include Urology, Nephrology, hepatology, GI, and possible IR liver biopsy  -patient is currently a full code  -p.r.n. pain control          "

## 2024-10-23 NOTE — PLAN OF CARE
Problem: Adult Inpatient Plan of Care  Goal: Plan of Care Review  Outcome: Progressing  Goal: Patient-Specific Goal (Individualized)  Outcome: Progressing  Goal: Absence of Hospital-Acquired Illness or Injury  Outcome: Progressing  Goal: Optimal Comfort and Wellbeing  Outcome: Progressing  Goal: Readiness for Transition of Care  Outcome: Progressing     Problem: Skin Injury Risk Increased  Goal: Skin Health and Integrity  Outcome: Progressing     Problem: Infection  Goal: Absence of Infection Signs and Symptoms  Outcome: Progressing     Problem: Acute Kidney Injury/Impairment  Goal: Fluid and Electrolyte Balance  Outcome: Progressing  Goal: Improved Oral Intake  Outcome: Progressing  Goal: Effective Renal Function  Outcome: Progressing         Discussed POC with pt, verbalized understanding.  Patient remains free from injury.  Safety precautions maintained.   Call light and personal belongings within reach, bed in lowest position with bed wheels locked.   No s/s of acute distress.  Purposeful rounding continued this shift.  Pain controlled per MD order.  Cardiac monitoring in place, tele box number [id number]. Reading normal sinus  Diet orders continued, pt diet: bland, NPO at midnight  Vital signs continued per orders this shift.   Continuous pulse ox in place.  Chart and orders review completed. Pt education about care completed.

## 2024-10-23 NOTE — ASSESSMENT & PLAN NOTE
Anemia is likely due to chronic disease due to Malignancy and chronic kidney disease. Most recent hemoglobin and hematocrit are listed below.  Recent Labs     10/22/24  1015 10/22/24  1829   HGB 12.7 10.6*   HCT 41.7 33.8*     Plan  - Monitor serial CBC: Daily  - Transfuse PRBC if patient becomes hemodynamically unstable, symptomatic or H/H drops below 7/21.  - Patient has not received any PRBC transfusions to date  - Patient's anemia is currently stable

## 2024-10-23 NOTE — SUBJECTIVE & OBJECTIVE
Interval History: No acute events overnight, afebrile, hemodynamically stable.  Patient still reports poor appetite, but has been trying to drink increased fluids, smoothies.  Still reports lower extremity and abdominal swelling.      Objective:     Vital Signs (Most Recent):  Temp: 97.7 °F (36.5 °C) (10/23/24 1556)  Pulse: 83 (10/23/24 1556)  Resp: 18 (10/23/24 1556)  BP: (!) 108/55 (10/23/24 1556)  SpO2: 96 % (10/23/24 1556) Vital Signs (24h Range):  Temp:  [97.7 °F (36.5 °C)-98.2 °F (36.8 °C)] 97.7 °F (36.5 °C)  Pulse:  [70-83] 83  Resp:  [16-22] 18  SpO2:  [93 %-100 %] 96 %  BP: ()/(51-63) 108/55     Weight: 104.6 kg (230 lb 9.6 oz)  Body mass index is 36.12 kg/m².    Intake/Output Summary (Last 24 hours) at 10/23/2024 1744  Last data filed at 10/23/2024 0537  Gross per 24 hour   Intake 895 ml   Output --   Net 895 ml         Physical Exam  Vitals and nursing note reviewed.   Constitutional:       General: She is not in acute distress.     Appearance: She is obese. She is not ill-appearing, toxic-appearing or diaphoretic.   HENT:      Head: Normocephalic and atraumatic.      Mouth/Throat:      Mouth: Mucous membranes are moist.   Eyes:      General: No scleral icterus.        Right eye: No discharge.         Left eye: No discharge.   Cardiovascular:      Rate and Rhythm: Normal rate and regular rhythm.      Heart sounds: Normal heart sounds.   Pulmonary:      Effort: Pulmonary effort is normal. No respiratory distress.      Breath sounds: Normal breath sounds.   Abdominal:      General: Bowel sounds are normal. There is distension.      Palpations: Abdomen is soft.      Tenderness: There is abdominal tenderness.   Musculoskeletal:         General: Swelling present.      Cervical back: No rigidity.      Right lower leg: Edema present.      Left lower leg: Edema present.   Skin:     General: Skin is warm and dry.      Coloration: Skin is not jaundiced.   Neurological:      Mental Status: She is alert and  oriented to person, place, and time. Mental status is at baseline.   Psychiatric:         Mood and Affect: Mood normal.         Behavior: Behavior normal.             Significant Labs: All pertinent labs within the past 24 hours have been reviewed.  LABS:  Recent Labs   Lab 10/22/24  1015 10/22/24  1829 10/23/24  0411    139 140   K 4.9 4.4 4.6    110 109   CO2 22* 19* 20*   BUN 84* 80* 82*   CREATININE 2.6* 2.4* 2.3*   GLU 85 79 77   ANIONGAP 10 10 11     Recent Labs   Lab 10/22/24  1015 10/22/24  1829   MG 2.5 2.4   PHOS 4.5  --      Recent Labs   Lab 10/22/24  1015 10/22/24  1829 10/23/24  0411   * 720* 717*   * 247* 237*   ALKPHOS 1,122* 1,018* 937*   BILITOT 1.3* 1.2* 1.3*   ALBUMIN 2.5* 2.3* 2.7*     Recent Labs   Lab 10/22/24  1015 10/22/24  1829 10/23/24  0411   WBC 4.41 4.10 3.56*   HGB 12.7 10.6* 10.6*   HCT 41.7 33.8* 33.8*    211 220   GRAN 71.5  3.2 75.9*  3.1 67.4  2.4           Micro  Blood Cultures  Lab Results   Component Value Date    LABBLOO No growth after 5 days. 10/03/2024    LABBLOO No growth after 5 days. 10/03/2024     Significant Imaging: I have reviewed all pertinent imaging results/findings within the past 24 hours.  US Abdomen Limited   Final Result      Severe heterogeneous echotexture throughout the liver with metastatic disease as above.         Electronically signed by: Chandrakant Fermin MD   Date:    10/23/2024   Time:    08:32      X-Ray Chest AP Portable   Final Result      1.  Similar degree of vascular congestion versus reticular interstitial changes throughout the lungs with patchy ground-glass opacities.  Stable small to moderate left effusion.  New small right effusion.  Differential considerations would include chronic or recurrent CHF versus interstitial infectious process such as atypical viral pneumonia.  Clinical correlation is advised.      2.  Stable findings as noted above.         Electronically signed by: Partha Miguel MD    Date:    10/22/2024   Time:    17:30      US Lower Extremity Veins Bilateral    (Results Pending)       Inpatient Medications:  Continuous Infusions:  Scheduled Meds:   electrolytes-dextrose  600 mL Oral Q4H    enoxparin  1 mg/kg Subcutaneous Q24H (prophylaxis, 1700)    sertraline  25 mg Oral Daily     PRN Meds:  Current Facility-Administered Medications:     albuterol sulfate, 2.5 mg, Nebulization, Q4H PRN    ALPRAZolam, 0.25 mg, Oral, TID PRN    alteplase, 2 mg, Intra-Catheter, PRN    dextrose 10%, 12.5 g, Intravenous, PRN    dextrose 10%, 25 g, Intravenous, PRN    fluticasone propionate, 1 spray, Each Nostril, Q12H PRN    glucagon (human recombinant), 1 mg, Intramuscular, PRN    glucose, 16 g, Oral, PRN    glucose, 24 g, Oral, PRN    hydrALAZINE, 10 mg, Intravenous, Q6H PRN    naloxone, 0.02 mg, Intravenous, PRN    ondansetron, 4 mg, Intravenous, Q6H PRN    oxyCODONE, 5 mg, Oral, Q6H PRN    sodium chloride 0.9%, 10 mL, Intravenous, Q12H PRN

## 2024-10-23 NOTE — ASSESSMENT & PLAN NOTE
"Patient with Hypoxic Respiratory failure which is Chronic.  she is on home oxygen at 3-4 LPM. Supplemental oxygen was provided and noted- Oxygen Concentration (%):  [36] 36    -CXR this admit with " Similar degree of vascular congestion versus reticular interstitial changes throughout the lungs with patchy ground-glass opacities.  Stable small to moderate left effusion.  New small right effusion.  Differential considerations would include chronic or recurrent CHF versus interstitial infectious process such as atypical viral pneumonia.  Clinical correlation is advised."  -recent CTA of chest 10/02/2024 was reviewed  -initial labs notable for white blood cell count 4.10, lactic acid level 1.0, BNP 27, afebrile  -procalcitonin level elevated, however utility limited given MIRELA      -no antibiotics warranted at this time  -no diuretics warranted at this time  -continuous pulse oximetry monitoring, O2 supplementation, incentive spirometry, and p.r.n. albuterol inhaler  "

## 2024-10-23 NOTE — ASSESSMENT & PLAN NOTE
-bland diet, Pedialyte scheduled, IV albumin x1 dose  -p.r.n. Antiemetics  -despite IV fluid shortage, may ultimately have to start IV fluids

## 2024-10-23 NOTE — ASSESSMENT & PLAN NOTE
"Patient with Hypoxic Respiratory failure which is Chronic.  she is on home oxygen at 3-4 LPM. Supplemental oxygen was provided and noted- Oxygen Concentration (%):  [36] 36    -CXR this admit with " Similar degree of vascular congestion versus reticular interstitial changes throughout the lungs with patchy ground-glass opacities.  Stable small to moderate left effusion.  New small right effusion.  Differential considerations would include chronic or recurrent CHF versus interstitial infectious process such as atypical viral pneumonia.  Clinical correlation is advised."  -recent CTA of chest 10/02/2024 was reviewed  -white blood cell count 4.10, lactic acid level 1.0, BNP 27, afebrile  -check lactic acid level and procalcitonin level  -no antibiotics warranted at this time  -no diuretics warranted at this time  -continuous pulse oximetry monitoring, O2 supplementation, incentive spirometry, and p.r.n. albuterol inhaler  "

## 2024-10-23 NOTE — ASSESSMENT & PLAN NOTE
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 New moderate-sized bilateral pleural effusions as described above with adjacent atelectasis or consolidation.No enlarged mediastinal and hilar lymph nodes. Intralobular septal thickening and associated groundglass opacity bilaterally felt to represent edema.New irregular soft tissue mass in the left upper quadrant concerning for carcinomatosis."  -CT scan of abdomen and pelvis 10/06/2024 with Widespread metastatic disease suspected with abnormal lymph nodes within the retroperitoneum, jovan hepatis, mediastinum as well as multiple abnormal liver lesions and focal thickening within the large bowel. Patient needs tissue sampling of liver lesion. Recommend colonoscopy and possible biopsy of sigmoid lesion."  -nuclear medicine PET scan 10/11/2024 with Overall there has been progression of disease. There is continued hypermetabolic adenopathy within the neck, chest (mediastinum and chest wall).  Some of these areas are stable with some nodes slightly diminished and some decreased degrees of FDG uptake within some of these nodes. However, there is extensive progression of peritoneal carcinomatosis throughout the abdomen and pelvis with increasing number and size of peritoneal hypermetabolic nodularity and increasing amount of ascites.  There is also large amount heterogeneous FDG uptake within the left hepatic lobe concerning for hepatic metastatic disease which is new. there has also been interval development moderate right-sided hydronephrosis and hydroureter with ureter likely being obstructed by peritoneal implants. increasing small to moderate bilateral loculated pleural effusions.  Extensive mixed interstitial and ground-glass infiltrates throughout the lungs.  Question " "pneumonia/post treatment related changes.  Lymphangitic carcinomatosis could appear similar"      -[facility] consulted and after goals of care discussion, plan to continue with treatment at this time.  Follow up recs  -p.r.n. pain control          "

## 2024-10-23 NOTE — ASSESSMENT & PLAN NOTE
-recent left lower extremity venous ultrasound 10/15/2024 was negative for DVT  -bilateral lower extremity edema most likely related to metastatic disease and hypoalbuminemia  -treatment dose Lovenox (renally dosed) for now

## 2024-10-23 NOTE — PROGRESS NOTES
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 Pugh from clinic for hypotension and abnormal labs with acute kidney injury and worsening liver enzymes.  Symptoms were acute onset, moderate severity, and with blood pressure improved with albumin administration given in the emergency department.  Patient denies any associated chest pain, increased shortness of breath, cough, fevers, chills.  She does complain of recurrent episodes of nausea with vomiting.  She has chronic abdominal pain but has noted increasing distention to her abdomen as well as increasing lower extremity swelling.  She denies any diarrhea, constipation, dysuria, or hematuria.  Patient is actively receiving chemotherapy with last dose received 10/15/2024.    Last hospital admit 10/2-10/07/2024 for shortness for breath, new bilateral pleural effusion, pneumonia, and images with worsening metastatic disease.  Patient received IV cefepime.    Overview/Hospital Course:  Admitted to Hospital Medicine for evaluation of constitutional symptoms and labs concerning for MIRELA in setting of poor oral intake.   "Started on increased fluid hydration.  Heme-Onc consulted and after goals of care discussion, plan to continue with treatment at this time.  Evaluation of lower extremity pain/swelling without concerns for DVT.  Evaluation of transaminitis with hepatitis panel negative, and abdominal ultrasound noting previously seen "severely heterogeneous echotexture throughout the liver with poorly defined mass within the left hepatic lobe measuring up to 8.8 cm. Additional smaller hypoechoic lesions seen throughout concerning for metastatic disease. " Negative for gallstones, common bile duct dilation, or Back's sign. Evaluation of transaminitis by Heme-Onc noted likely secondary to hypovolemia with concerns if no improvement could be secondary to advancement of malignancy but less likely given recent chemotherapy resumption. Abdominal ultrasound noted concerns for "Mild to moderate right-sided hydronephrosis", Urology consulted for further evaluation.      Interval History: No acute events overnight, afebrile, hemodynamically stable.  Patient still reports poor appetite, but has been trying to drink increased fluids, smoothies.  Still reports lower extremity and abdominal swelling.      Objective:     Vital Signs (Most Recent):  Temp: 97.7 °F (36.5 °C) (10/23/24 1556)  Pulse: 83 (10/23/24 1556)  Resp: 18 (10/23/24 1556)  BP: (!) 108/55 (10/23/24 1556)  SpO2: 96 % (10/23/24 1556) Vital Signs (24h Range):  Temp:  [97.7 °F (36.5 °C)-98.2 °F (36.8 °C)] 97.7 °F (36.5 °C)  Pulse:  [70-83] 83  Resp:  [16-22] 18  SpO2:  [93 %-100 %] 96 %  BP: ()/(51-63) 108/55     Weight: 104.6 kg (230 lb 9.6 oz)  Body mass index is 36.12 kg/m².    Intake/Output Summary (Last 24 hours) at 10/23/2024 8455  Last data filed at 10/23/2024 0537  Gross per 24 hour   Intake 895 ml   Output --   Net 895 ml         Physical Exam  Vitals and nursing note reviewed.   Constitutional:       General: She is not in acute distress.     Appearance: She is " obese. She is not ill-appearing, toxic-appearing or diaphoretic.   HENT:      Head: Normocephalic and atraumatic.      Mouth/Throat:      Mouth: Mucous membranes are moist.   Eyes:      General: No scleral icterus.        Right eye: No discharge.         Left eye: No discharge.   Cardiovascular:      Rate and Rhythm: Normal rate and regular rhythm.      Heart sounds: Normal heart sounds.   Pulmonary:      Effort: Pulmonary effort is normal. No respiratory distress.      Breath sounds: Normal breath sounds.   Abdominal:      General: Bowel sounds are normal. There is distension.      Palpations: Abdomen is soft.      Tenderness: There is abdominal tenderness.   Musculoskeletal:         General: Swelling present.      Cervical back: No rigidity.      Right lower leg: Edema present.      Left lower leg: Edema present.   Skin:     General: Skin is warm and dry.      Coloration: Skin is not jaundiced.   Neurological:      Mental Status: She is alert and oriented to person, place, and time. Mental status is at baseline.   Psychiatric:         Mood and Affect: Mood normal.         Behavior: Behavior normal.             Significant Labs: All pertinent labs within the past 24 hours have been reviewed.  LABS:  Recent Labs   Lab 10/22/24  1015 10/22/24  1829 10/23/24  0411    139 140   K 4.9 4.4 4.6    110 109   CO2 22* 19* 20*   BUN 84* 80* 82*   CREATININE 2.6* 2.4* 2.3*   GLU 85 79 77   ANIONGAP 10 10 11     Recent Labs   Lab 10/22/24  1015 10/22/24  1829   MG 2.5 2.4   PHOS 4.5  --      Recent Labs   Lab 10/22/24  1015 10/22/24  1829 10/23/24  0411   * 720* 717*   * 247* 237*   ALKPHOS 1,122* 1,018* 937*   BILITOT 1.3* 1.2* 1.3*   ALBUMIN 2.5* 2.3* 2.7*     Recent Labs   Lab 10/22/24  1015 10/22/24  1829 10/23/24  0411   WBC 4.41 4.10 3.56*   HGB 12.7 10.6* 10.6*   HCT 41.7 33.8* 33.8*    211 220   GRAN 71.5  3.2 75.9*  3.1 67.4  2.4           Micro  Blood Cultures  Lab Results    Component Value Date    LABBLOO No growth after 5 days. 10/03/2024    LABBLOO No growth after 5 days. 10/03/2024     Significant Imaging: I have reviewed all pertinent imaging results/findings within the past 24 hours.  US Abdomen Limited   Final Result      Severe heterogeneous echotexture throughout the liver with metastatic disease as above.         Electronically signed by: Chandrakant Fermin MD   Date:    10/23/2024   Time:    08:32      X-Ray Chest AP Portable   Final Result      1.  Similar degree of vascular congestion versus reticular interstitial changes throughout the lungs with patchy ground-glass opacities.  Stable small to moderate left effusion.  New small right effusion.  Differential considerations would include chronic or recurrent CHF versus interstitial infectious process such as atypical viral pneumonia.  Clinical correlation is advised.      2.  Stable findings as noted above.         Electronically signed by: Partha Miguel MD   Date:    10/22/2024   Time:    17:30      US Lower Extremity Veins Bilateral    (Results Pending)       Inpatient Medications:  Continuous Infusions:  Scheduled Meds:   electrolytes-dextrose  600 mL Oral Q4H    enoxparin  1 mg/kg Subcutaneous Q24H (prophylaxis, 1700)    sertraline  25 mg Oral Daily     PRN Meds:  Current Facility-Administered Medications:     albuterol sulfate, 2.5 mg, Nebulization, Q4H PRN    ALPRAZolam, 0.25 mg, Oral, TID PRN    alteplase, 2 mg, Intra-Catheter, PRN    dextrose 10%, 12.5 g, Intravenous, PRN    dextrose 10%, 25 g, Intravenous, PRN    fluticasone propionate, 1 spray, Each Nostril, Q12H PRN    glucagon (human recombinant), 1 mg, Intramuscular, PRN    glucose, 16 g, Oral, PRN    glucose, 24 g, Oral, PRN    hydrALAZINE, 10 mg, Intravenous, Q6H PRN    naloxone, 0.02 mg, Intravenous, PRN    ondansetron, 4 mg, Intravenous, Q6H PRN    oxyCODONE, 5 mg, Oral, Q6H PRN    sodium chloride 0.9%, 10 mL, Intravenous, Q12H PRN      Assessment/Plan:      *  "Acute renal failure superimposed on stage 3a chronic kidney disease  MIRELA is likely due to pre-renal azotemia due to dehydration related to nausea with vomiting, hypotension, inadequate oral intake, and likely also related to progressive metastatic disease. Baseline creatinine is  1.1-1.3 . Most recent creatinine and eGFR are listed below.  Recent Labs     10/22/24  1015 10/22/24  1829 10/23/24  0411   CREATININE 2.6* 2.4* 2.3*   EGFRNORACEVR 19* 21* 22*        Plan  - MIRELA is worsening. Will continue current treatment--> patient received albumin 25% 25 g IV x1 dose in the emergency department. Patient is in need of IV fluids for the treatment of MIRELA.  Due to a shortage of IV fluids, patient to receive oral fluids.  Patient must receive this treatment in a hospital location due to the risk of adverse effects, insufficient response to treatment, or other potential complications of disease. We will encourage increased fluid intake, Pedialyte 600 mL p.o. q.4 hours ordered  - Avoid nephrotoxins and renally dose meds for GFR listed above  - Monitor urine output, serial BMP, and adjust therapy as needed  - Patient with history of ureteral stent placement(records unavailable),  Abdominal ultrasound noted concerns for "Mild to moderate right-sided hydronephrosis", Urology consulted for further evaluation.      Normocytic anemia  Anemia is likely due to chronic disease due to Malignancy and chronic kidney disease. Most recent hemoglobin and hematocrit are listed below.  Recent Labs     10/22/24  1015 10/22/24  1829 10/23/24  0411   HGB 12.7 10.6* 10.6*   HCT 41.7 33.8* 33.8*       Plan  - Monitor serial CBC: Daily  - Transfuse PRBC if patient becomes hemodynamically unstable, symptomatic or H/H drops below 7/21.  - Patient has not received any PRBC transfusions to date  - Patient's anemia is currently stable      History of DVT (deep vein thrombosis)  -recent left lower extremity venous ultrasound 10/15/2024 was negative for " "DVT  -bilateral lower extremity edema most likely related to metastatic disease and hypoalbuminemia  -treatment dose Lovenox (renally dosed) for now    PLAN:  Patient at high-risk of thrombosis due to malignancy, follow up repeat ultrasound      Chronic diastolic CHF (congestive heart failure)  Patient has Diastolic (HFpEF) heart failure that is Chronic. On presentation their CHF was well compensated. Most recent BNP and echo results are listed below.  Recent Labs     10/22/24  1829   BNP 27       Latest ECHO  Results for orders placed during the hospital encounter of 09/03/24    Echo    Interpretation Summary    Left Ventricle: The left ventricle is normal in size. Normal wall thickness. There is normal systolic function with a visually estimated ejection fraction of 60 - 65%. Biplane (2D) method of discs ejection fraction is 56%. Global longitudinal strain is -19.5%. There is normal diastolic function.    Right Ventricle: Normal right ventricular cavity size. Wall thickness is normal. Systolic function is normal.    IVC/SVC: Normal venous pressure at 3 mmHg.    Malignant neoplasm of both ovaries    Baseline echo 9/3/2024  NORM -19.47%  Biplane 56%    Current Heart Failure Medications  hydrALAZINE injection 10 mg, Every 6 hours PRN, Intravenous    Plan  - Monitor strict I&Os and daily weights.    - Place on telemetry  - Cardiology has not been consulted  - The patient's volume status is at their baseline    Chronic respiratory failure with hypoxia  Patient with Hypoxic Respiratory failure which is Chronic.  she is on home oxygen at 3-4 LPM. Supplemental oxygen was provided and noted- Oxygen Concentration (%):  [36] 36    -CXR this admit with " Similar degree of vascular congestion versus reticular interstitial changes throughout the lungs with patchy ground-glass opacities.  Stable small to moderate left effusion.  New small right effusion.  Differential considerations would include chronic or recurrent CHF versus " "interstitial infectious process such as atypical viral pneumonia.  Clinical correlation is advised."  -recent CTA of chest 10/02/2024 was reviewed  -initial labs notable for white blood cell count 4.10, lactic acid level 1.0, BNP 27, afebrile  -procalcitonin level elevated, however utility limited given MIRELA      -no antibiotics warranted at this time  -no diuretics warranted at this time  -continuous pulse oximetry monitoring, O2 supplementation, incentive spirometry, and p.r.n. albuterol inhaler    Elevated liver enzymes  Upon admission, alk phos 1018, total bilirubin 1.2, , , INR 1.2, BNP 27, lactic acid level 1.0, creatinine 2.4  -Evaluation of transaminitis with hepatitis panel negative, and abdominal ultrasound noting previously seen "severely heterogeneous echotexture throughout the liver with poorly defined mass within the left hepatic lobe measuring up to 8.8 cm. Additional smaller hypoechoic lesions seen throughout concerning for metastatic disease. " Negative for gallstones, common bile duct dilation, or Back's sign.   -Evaluation of transaminitis by Heme-Onc noted likely secondary to hypovolemia with concerns if no improvement could be secondary to advancement of malignancy but less likely given recent chemotherapy resumption.      -hold Crestor and avoid hepatotoxic agents  -recent CT scan of abdomen/pelvis and PET scan were reviewed  -trend on CMP with volume repletion.  If no improvement, will consider hepatology consult      Hypotension due to hypovolemia  -hypotension improved with IV albumin given in the emergency department  -hold Norvasc, atenolol, Benicar/HCT  -limit sedating agents      Nausea & vomiting  -bland diet, Pedialyte scheduled, IV albumin x1 dose  -p.r.n. Antiemetics        JERALD (obstructive sleep apnea)  Nocturnal CPAP      Ovarian cancer, bilateral  Stage IV metastatic bilateral ovarian cancer with peritoneal carcinomatosis  -past bilateral " "salpingo-oophorectomy  -currently receiving chemotherapy, last dose on Tuesday 10/15/2024  -CTA of chest 10/02/2024 with No pulmonary emboli. New moderate-sized bilateral pleural effusions as described above with adjacent atelectasis or consolidation.No enlarged mediastinal and hilar lymph nodes. Intralobular septal thickening and associated groundglass opacity bilaterally felt to represent edema.New irregular soft tissue mass in the left upper quadrant concerning for carcinomatosis."  -CT scan of abdomen and pelvis 10/06/2024 with Widespread metastatic disease suspected with abnormal lymph nodes within the retroperitoneum, jovan hepatis, mediastinum as well as multiple abnormal liver lesions and focal thickening within the large bowel. Patient needs tissue sampling of liver lesion. Recommend colonoscopy and possible biopsy of sigmoid lesion."  -nuclear medicine PET scan 10/11/2024 with Overall there has been progression of disease. There is continued hypermetabolic adenopathy within the neck, chest (mediastinum and chest wall).  Some of these areas are stable with some nodes slightly diminished and some decreased degrees of FDG uptake within some of these nodes. However, there is extensive progression of peritoneal carcinomatosis throughout the abdomen and pelvis with increasing number and size of peritoneal hypermetabolic nodularity and increasing amount of ascites.  There is also large amount heterogeneous FDG uptake within the left hepatic lobe concerning for hepatic metastatic disease which is new. there has also been interval development moderate right-sided hydronephrosis and hydroureter with ureter likely being obstructed by peritoneal implants. increasing small to moderate bilateral loculated pleural effusions.  Extensive mixed interstitial and ground-glass infiltrates throughout the lungs.  Question pneumonia/post treatment related changes.  Lymphangitic carcinomatosis could appear " "similar"      -Heme-Onc consulted and after goals of care discussion, plan to continue with treatment at this time.  Follow up recs  -p.r.n. pain control            Peritoneal carcinomatosis  See discussion for bilateral ovarian cancer    Hyperlipidemia  -hold Crestor in the setting of worsening LFTs          VTE Risk Mitigation (From admission, onward)           Ordered     enoxaparin injection 105 mg  Every 24 hours         10/22/24 2032     IP VTE HIGH RISK PATIENT  Once         10/22/24 2032     Place sequential compression device  Until discontinued         10/22/24 2032                    Discharge Planning   EVE:      Code Status: Full Code   Is the patient medically ready for discharge?:     Reason for patient still in hospital (select all that apply): Patient trending condition, Laboratory test, Treatment, Imaging, and Consult recommendations  Discharge Plan A: Home with family          Anival Boris Hernandez DO  Department of Hospital Medicine   [facility]    Voice recognition software was used in the creation of this note/communication and any sound-alike/typographical errors which may have occurred despite initial review prior to signing should be taken in context when interpreting.  If such errors prevent a clear understanding of the note/communication, please contact the provider/office for clarification.     "

## 2024-10-23 NOTE — PHARMACY MED REC
"Admission Medication History     The home medication history was taken by Sherie Dueñas.    You may go to "Admission" then "Reconcile Home Medications" tabs to review and/or act upon these items.     The home medication list has been updated by the Pharmacy department.   Please read ALL comments highlighted in yellow.   Please address this information as you see fit.    Feel free to contact us if you have any questions or require assistance.        Medications listed below were obtained from: Patient/family and Analytic software- [employer]      [facility] REC COMPLETED:     Sherie Dueñas  [id number]    Current Outpatient Medications on File Prior to Encounter   Medication Sig Dispense Refill Last Dose/Taking    amLODIPine (NORVASC) 5 MG tablet Take 2 tablets (10 mg total) by mouth once daily. 180 tablet 3 10/21/2024    atenoloL (TENORMIN) 25 MG tablet Take 25 mg by mouth once daily.   10/21/2024    biotin 1 mg tablet Take 1,000 mcg by mouth once daily.    10/21/2024    diclofenac sodium (VOLTAREN) 1 % Gel Apply once a day to back as needed 100 g 1 10/21/2024    gabapentin (NEURONTIN) 300 MG capsule Take 2 capsules (600 mg total) by mouth 3 (three) times daily. 180 capsule 3 10/21/2024    ginkgo biloba 40 mg Tab Take 40 mg by mouth once daily.   10/21/2024    multivitamin with minerals tablet Take 1 tablet by mouth once daily.    10/21/2024    olmesartan-hydrochlorothiazide (BENICAR HCT) 40-25 mg per tablet Take 1 tablet by mouth once daily. 90 tablet 1 10/21/2024    ondansetron (ZOFRAN-ODT) 8 MG TbDL Take 1 tablet (8 mg total) by mouth every 8 (eight) hours. 90 tablet 5 10/21/2024    rosuvastatin (CRESTOR) 10 MG tablet Take 1 tablet (10 mg total) by mouth once daily. 90 tablet 3 10/21/2024    sertraline (ZOLOFT) 25 MG tablet Take 1 tablet (25 mg total) by mouth once daily. 90 tablet 0 10/21/2024    albuterol (PROVENTIL/VENTOLIN HFA) 90 mcg/actuation inhaler Inhale 2 puffs into the lungs every 4 (four) hours as " needed for Wheezing. Rescue 18 g 1 More than a month    ALPRAZolam (XANAX) 0.25 MG tablet Take 1 tablet (0.25 mg total) by mouth 3 (three) times daily as needed for Anxiety. 60 tablet 2 More than a month    cetirizine (ZYRTEC) 10 MG tablet Take 1 tablet (10 mg total) by mouth once daily. (Patient taking differently: Take 10 mg by mouth as needed for Allergies.) 30 tablet 5 Unknown    EPINEPHrine (EPIPEN) 0.3 mg/0.3 mL AtIn Inject 0.3 mLs (0.3 mg total) into the muscle once. for 1 dose 2 each 0     fluticasone propionate (FLONASE) 50 mcg/actuation nasal spray 1 spray (50 mcg total) by Each Nostril route every 12 (twelve) hours as needed for Rhinitis. 16 g 0 More than a month    prochlorperazine (COMPAZINE) 5 MG tablet Take 1 tablet (5 mg total) by mouth every 6 (six) hours as needed for Nausea. 20 tablet 11 Unknown    traMADoL (ULTRAM) 50 mg tablet Take 1 tablet (50 mg total) by mouth every 12 (twelve) hours as needed for Pain. 30 tablet 0 More than a month                           .

## 2024-10-23 NOTE — ASSESSMENT & PLAN NOTE
-recent left lower extremity venous ultrasound 10/15/2024 was negative for DVT  -bilateral lower extremity edema most likely related to metastatic disease and hypoalbuminemia  -treatment dose Lovenox (renally dosed) for now    PLAN:  Patient at high-risk of thrombosis due to malignancy, follow up repeat ultrasound

## 2024-10-23 NOTE — ASSESSMENT & PLAN NOTE
Elevated liver enzymes likely secondary to progression of metastatic disease however hypotension may be playing a role with possibility of underlying shock liver  -alk phos 1018, total bilirubin 1.2, , , INR 1.2, BNP 27, lactic acid level 1.0, creatinine 2.4  -check thyroid studies, CK level, acute hepatitis panel, aspirin level, and acetaminophen level  -hold Crestor and avoid hepatotoxic agents  -check abdominal ultrasound  -recent CT scan of abdomen/pelvis and PET scan were reviewed  -consider hepatology and IR consults, patient may need liver biopsy if she elects against palliative care  -check a.m. labs

## 2024-10-23 NOTE — ASSESSMENT & PLAN NOTE
"MIRELA is likely due to pre-renal azotemia due to dehydration related to nausea with vomiting, hypotension, inadequate oral intake, and likely also related to progressive metastatic disease. Baseline creatinine is  1.1-1.3 . Most recent creatinine and eGFR are listed below.  Recent Labs     10/22/24  1015 10/22/24  1829 10/23/24  0411   CREATININE 2.6* 2.4* 2.3*   EGFRNORACEVR 19* 21* 22*        Plan  - MIRELA is worsening. Will continue current treatment--> patient received albumin 25% 25 g IV x1 dose in the emergency department. Patient is in need of IV fluids for the treatment of MIRELA.  Due to a shortage of IV fluids, patient to receive oral fluids.  Patient must receive this treatment in a hospital location due to the risk of adverse effects, insufficient response to treatment, or other potential complications of disease. We will encourage increased fluid intake, Pedialyte 600 mL p.o. q.4 hours ordered  - Avoid nephrotoxins and renally dose meds for GFR listed above  - Monitor urine output, serial BMP, and adjust therapy as needed  - Patient with history of ureteral stent placement(records unavailable),  Abdominal ultrasound noted concerns for "Mild to moderate right-sided hydronephrosis", Urology consulted for further evaluation.    "

## 2024-10-23 NOTE — ASSESSMENT & PLAN NOTE
MIRELA is likely due to pre-renal azotemia due to dehydration related to nausea with vomiting, hypotension, inadequate oral intake, and likely also related to progressive metastatic disease. Baseline creatinine is  1.1-1.3 . Most recent creatinine and eGFR are listed below.  Recent Labs     10/22/24  1015 10/22/24  1829   CREATININE 2.6* 2.4*   EGFRNORACEVR 19* 21*      Plan  - MIRELA is worsening. Will continue current treatment--> patient received albumin 25% 25 g IV x1 dose in the emergency department.  We will encourage increased fluid intake, Pedialyte 600 mL p.o. q.4 hours ordered  - Avoid nephrotoxins and renally dose meds for GFR listed above  - Monitor urine output, serial BMP, and adjust therapy as needed  - check urinalysis with reflex culture  -check bladder scan  -check abdominal ultrasound  -hold Benicar/HCT  -recent CT scan of abdomen and pelvis and PET scan were reviewed  -patient with history of ureteral stent placement (I am unable to find op note/date)  -patient may ultimately need nephrology and urology consultations

## 2024-10-23 NOTE — ASSESSMENT & PLAN NOTE
Anemia is likely due to chronic disease due to Malignancy and chronic kidney disease. Most recent hemoglobin and hematocrit are listed below.  Recent Labs     10/22/24  1015 10/22/24  1829 10/23/24  0411   HGB 12.7 10.6* 10.6*   HCT 41.7 33.8* 33.8*       Plan  - Monitor serial CBC: Daily  - Transfuse PRBC if patient becomes hemodynamically unstable, symptomatic or H/H drops below 7/21.  - Patient has not received any PRBC transfusions to date  - Patient's anemia is currently stable

## 2024-10-23 NOTE — SUBJECTIVE & OBJECTIVE
Past Medical History:   Diagnosis Date    Anemia     Anxiety     Arthritis     knees    Cancer     ovarian    CHF (congestive heart failure)     Hx antineoplastic chemotherapy     last 2019    Hyperlipemia     Hypertension     Neck pain     Ovarian cancer 2019    CHEMO    Peritoneal carcinomatosis 2019       Past Surgical History:   Procedure Laterality Date    breast reduction  10/02/2018    CATARACT EXTRACTION Bilateral     2023     SECTION      X 1    COLONOSCOPY N/A 2021    Procedure: COLONOSCOPY;  Surgeon: Paulette Rojas MD;  Location: [facility] ENDO;  Service: Gastroenterology;  Laterality: N/A;    CYSTOSCOPY W/ URETERAL STENT PLACEMENT Right 2019    Procedure: CYSTOSCOPY, WITH URETERAL STENT INSERTION;  Surgeon: Timmy Santiago IV, MD;  Location: [facility] OR;  Service: Urology;  Laterality: Right;    CYSTOSCOPY W/ URETERAL STENT REMOVAL  10/04/2019    DILATION AND CURETTAGE OF UTERUS      HYSTERECTOMY      RALH for fibroids (still has ovaries)    INSERTION OF VENOUS ACCESS PORT Left 2019    Procedure: INSERTION, VENOUS ACCESS PORT;  Surgeon: Ulisses Monzon MD;  Location: [facility] OR;  Service: General;  Laterality: Left;  Left internal jugular     LYSIS OF ADHESIONS OF URETER N/A 08/15/2019    Procedure: URETEROLYSIS;  Surgeon: Ismael Juarez MD;  Location: [facility] OR;  Service: OB/GYN;  Laterality: N/A;    OMENTECTOMY N/A 08/15/2019    Procedure: OMENTECTOMY;  Surgeon: Ismael Juarez MD;  Location: [facility] OR;  Service: OB/GYN;  Laterality: N/A;    OOPHORECTOMY      RETROGRADE PYELOGRAPHY Right 2019    Procedure: PYELOGRAM, RETROGRADE;  Surgeon: Timmy Santiago IV, MD;  Location: [facility] OR;  Service: Urology;  Laterality: Right;    ROBOT-ASSISTED LAPAROSCOPIC SALPINGO-OOPHORECTOMY USING DA TIA XI Bilateral 08/15/2019    Procedure: XI ROBOTIC SALPINGO-OOPHORECTOMY;  Surgeon: Ismael Juarez MD;  [facility];  Service: OB/GYN;  Laterality: Bilateral;    TOTAL  REDUCTION MAMMOPLASTY  2018    TUBAL LIGATION         Review of patient's allergies indicates:  No Known Allergies    No current facility-administered medications on file prior to encounter.     Current Outpatient Medications on File Prior to Encounter   Medication Sig    amLODIPine (NORVASC) 5 MG tablet Take 2 tablets (10 mg total) by mouth once daily.    atenoloL (TENORMIN) 25 MG tablet Take 25 mg by mouth once daily.    biotin 1 mg tablet Take 1,000 mcg by mouth once daily.     diclofenac sodium (VOLTAREN) 1 % Gel Apply once a day to back as needed    gabapentin (NEURONTIN) 300 MG capsule Take 2 capsules (600 mg total) by mouth 3 (three) times daily.    ginkgo biloba 40 mg Tab Take 40 mg by mouth once daily.    multivitamin with minerals tablet Take 1 tablet by mouth once daily.     olmesartan-hydrochlorothiazide (BENICAR HCT) 40-25 mg per tablet Take 1 tablet by mouth once daily.    ondansetron (ZOFRAN-ODT) 8 MG TbDL Take 1 tablet (8 mg total) by mouth every 8 (eight) hours.    rosuvastatin (CRESTOR) 10 MG tablet Take 1 tablet (10 mg total) by mouth once daily.    sertraline (ZOLOFT) 25 MG tablet Take 1 tablet (25 mg total) by mouth once daily.    albuterol (PROVENTIL/VENTOLIN HFA) 90 mcg/actuation inhaler Inhale 2 puffs into the lungs every 4 (four) hours as needed for Wheezing. Rescue    ALPRAZolam (XANAX) 0.25 MG tablet Take 1 tablet (0.25 mg total) by mouth 3 (three) times daily as needed for Anxiety.    cetirizine (ZYRTEC) 10 MG tablet Take 1 tablet (10 mg total) by mouth once daily. (Patient taking differently: Take 10 mg by mouth as needed for Allergies.)    EPINEPHrine (EPIPEN) 0.3 mg/0.3 mL AtIn Inject 0.3 mLs (0.3 mg total) into the muscle once. for 1 dose    fluticasone propionate (FLONASE) 50 mcg/actuation nasal spray 1 spray (50 mcg total) by Each Nostril route every 12 (twelve) hours as needed for Rhinitis.    prochlorperazine (COMPAZINE) 5 MG tablet Take 1 tablet (5 mg total) by mouth every 6  (six) hours as needed for Nausea.    traMADoL (ULTRAM) 50 mg tablet Take 1 tablet (50 mg total) by mouth every 12 (twelve) hours as needed for Pain.     Family History       Problem Relation (Age of Onset)    Anesthesia problems Other    Breast cancer Maternal Aunt, Paternal Aunt    Colon cancer Brother    Glaucoma Mother    No Known Problems Father    Ovarian cancer Paternal Aunt          Tobacco Use    Smoking status: Former     Current packs/day: 0.00     Average packs/day: 1 pack/day for 25.0 years (25.0 ttl pk-yrs)     Types: Cigarettes     Start date: 10/1/1993     Quit date: 10/1/2018     Years since quittin.0    Smokeless tobacco: Never    Tobacco comments:     States started quit 2 months ago after 30 years   Substance and Sexual Activity    Alcohol use: Yes     Comment: occasionally  No alcohol 72h prior to sx    Drug use: No    Sexual activity: Not Currently     Partners: Male     Comment: hyst; mut monog     Review of Systems   Constitutional:  Negative for chills and fever.   Respiratory:  Positive for shortness of breath (chronic, no change). Negative for cough.    Cardiovascular:  Positive for leg swelling. Negative for chest pain.   Gastrointestinal:  Positive for abdominal distention, abdominal pain (chronic), nausea and vomiting. Negative for constipation and diarrhea.   Genitourinary:  Negative for difficulty urinating, dysuria and hematuria.   All other systems reviewed and are negative.    Objective:     Vital Signs (Most Recent):  Temp: 97.8 °F (36.6 °C) (10/22/24 2048)  Pulse: 80 (10/22/24 2048)  Resp: 16 (10/22/24 2048)  BP: (!) 122/58 (10/22/24 2048)  SpO2: 98 % (10/22/24 2048) Vital Signs (24h Range):  Temp:  [97 °F (36.1 °C)-98.3 °F (36.8 °C)] 97.8 °F (36.6 °C)  Pulse:  [74-81] 80  Resp:  [16-23] 16  SpO2:  [93 %-100 %] 98 %  BP: ()/(55-63) 122/58     Weight: 104.6 kg (230 lb 9.6 oz)  Body mass index is 36.12 kg/m².     Physical Exam  Vitals reviewed.   Constitutional:        General: She is not in acute distress.     Appearance: She is obese. She is not diaphoretic.   HENT:      Nose: Nose normal.   Eyes:      Conjunctiva/sclera: Conjunctivae normal.   Cardiovascular:      Rate and Rhythm: Normal rate.   Pulmonary:      Effort: Pulmonary effort is normal. No respiratory distress.   Abdominal:      General: There is distension.      Tenderness: There is abdominal tenderness. There is no rebound.   Musculoskeletal:      Right lower leg: Edema present.      Left lower leg: Edema present.   Skin:     General: Skin is warm and dry.   Neurological:      Mental Status: She is alert and oriented to person, place, and time.   Psychiatric:         Mood and Affect: Mood normal.         Behavior: Behavior normal.                Significant Labs: All pertinent labs within the past 24 hours have been reviewed.  Recent Lab Results         10/22/24  1928   10/22/24  1829   10/22/24  1015        Albumin   2.3   2.5       ALP   1,018   1,122       ALT   247   270       Anion Gap   10   10       Appearance, UA Hazy           PTT   64.7  Comment: Refer to local heparin nomogram for intensity/dose specific   therapeutic   range.           AST   720   774       Bacteria, UA Rare           Baso #   0.04   0.04       Basophil %   1.0   0.9       Bilirubin (UA) Negative           BILIRUBIN TOTAL   1.2  Comment: For infants and newborns, interpretation of results should be based  on gestational age, weight and in agreement with clinical  observations.    Premature Infant recommended reference ranges:  Up to 24 hours.............<8.0 mg/dL  Up to 48 hours............<12.0 mg/dL  3-5 days..................<15.0 mg/dL  6-29 days.................<15.0 mg/dL     1.3  Comment: For infants and newborns, interpretation of results should be based  on gestational age, weight and in agreement with clinical  observations.    Premature Infant recommended reference ranges:  Up to 24 hours.............<8.0 mg/dL  Up to 48  hours............<12.0 mg/dL  3-5 days..................<15.0 mg/dL  6-29 days.................<15.0 mg/dL         BNP   27  Comment: Values of less than 100 pg/ml are consistent with non-CHF populations.         BUN   80   84       Calcium   8.3   9.2       Chloride   110   108       CO2   19   22       Color, UA Yellow           Creatinine   2.4   2.6       Differential Method   Automated   Automated       eGFR   21   19       Eos #   0.0   0.1       Eos %   0.7   2.0       Glucose   79   85       Glucose, UA Negative           Gran # (ANC)   3.1   3.2       Gran %   75.9   71.5       Hematocrit   33.8   41.7       Hemoglobin   10.6   12.7       Hyaline Casts, UA 8           Immature Grans (Abs)   0.01  Comment: Mild elevation in immature granulocytes is non specific and   can be seen in a variety of conditions including stress response,   acute inflammation, trauma and pregnancy. Correlation with other   laboratory and clinical findings is essential.     0.01  Comment: Mild elevation in immature granulocytes is non specific and   can be seen in a variety of conditions including stress response,   acute inflammation, trauma and pregnancy. Correlation with other   laboratory and clinical findings is essential.         Immature Granulocytes   0.2   0.2       INR   1.2  Comment: Coumadin Therapy:  2.0 - 3.0 for INR for all indicators except mechanical heart valves  and antiphospholipid syndromes which should use 2.5 - 3.5.           Ketones, UA Negative           Lactic Acid Level   1.0  Comment: Falsely low lactic acid results can be found in samples   containing >=13.0 mg/dL total bilirubin and/or >=3.5 mg/dL   direct bilirubin.           Leukocyte Esterase, UA Negative           Lymph #   0.6   0.8       Lymph %   14.4   17.0       Magnesium    2.4   2.5       MCH   27.7   27.5       MCHC   31.4   30.5       MCV   89   90       Microscopic Comment SEE COMMENT  Comment: Other formed elements not mentioned in the  report are not   present in the microscopic examination.              Mono #   0.3   0.4       Mono %   7.8   8.4       MPV   9.9   10.3       NITRITE UA Negative           nRBC   0   0       Blood, UA 2+           pH, UA 5.0           Platelet Count   211   257       Potassium   4.4   4.9       PROTEIN TOTAL   5.9   6.5       Protein, UA 1+  Comment: Recommend a 24 hour urine protein or a urine   protein/creatinine ratio if globulin induced proteinuria is  clinically suspected.             PT   13.5         RBC   3.82   4.62       RBC, UA >100           RDW   19.3   19.8       Sodium   139   140       Spec Grav UA 1.020           Specimen UA Urine, Clean Catch           Squam Epithel, UA 1           Troponin I   <0.006  Comment: The reference interval for Troponin I represents the 99th percentile   cutoff   for our facility and is consistent with 3rd generation assay   performance.           UROBILINOGEN UA 2.0-3.0           WBC, UA 5           WBC   4.10   4.41               Significant Imaging: I have reviewed all pertinent imaging results/findings within the past 24 hours.  X-Ray Chest AP Portable   Final Result      1.  Similar degree of vascular congestion versus reticular interstitial changes throughout the lungs with patchy ground-glass opacities.  Stable small to moderate left effusion.  New small right effusion.  Differential considerations would include chronic or recurrent CHF versus interstitial infectious process such as atypical viral pneumonia.  Clinical correlation is advised.      2.  Stable findings as noted above.         Electronically signed by: Partha Miguel MD   Date:    10/22/2024   Time:    17:30      US Abdomen Limited    (Results Pending)

## 2024-10-23 NOTE — PLAN OF CARE
Patient is stable. No signs or symptoms of acute distress. Meds given as ordered. Continuous cardiac and pulse ox monitoring in place. Bed in lowest position, call light in reach. Chart orders reviewed.

## 2024-10-23 NOTE — ASSESSMENT & PLAN NOTE
-hypotension improved with IV albumin given in the emergency department  -hold Norvasc, atenolol, Benicar/HCT  -limit sedating agents

## 2024-10-23 NOTE — ASSESSMENT & PLAN NOTE
"Upon admission, alk phos 1018, total bilirubin 1.2, , , INR 1.2, BNP 27, lactic acid level 1.0, creatinine 2.4  -Evaluation of transaminitis with hepatitis panel negative, and abdominal ultrasound noting previously seen "severely heterogeneous echotexture throughout the liver with poorly defined mass within the left hepatic lobe measuring up to 8.8 cm. Additional smaller hypoechoic lesions seen throughout concerning for metastatic disease. " Negative for gallstones, common bile duct dilation, or Back's sign.   -Evaluation of transaminitis by Heme-Onc noted likely secondary to hypovolemia with concerns if no improvement could be secondary to advancement of malignancy but less likely given recent chemotherapy resumption.      -hold Crestor and avoid hepatotoxic agents  -recent CT scan of abdomen/pelvis and PET scan were reviewed  -trend on CMP with volume repletion.  If no improvement, will consider hepatology consult    "

## 2024-10-23 NOTE — HOSPITAL COURSE
"Admitted to Hospital Medicine for evaluation of constitutional symptoms and labs concerning for MIRELA in setting of poor oral intake.  Started on increased fluid hydration.  Heme-Onc consulted and after goals of care discussion, plan to continue with treatment at this time.  Evaluation of lower extremity pain/swelling without concerns for DVT.  Evaluation of transaminitis with hepatitis panel negative, and abdominal ultrasound noting previously seen "severely heterogeneous echotexture throughout the liver with poorly defined mass within the left hepatic lobe measuring up to 8.8 cm. Additional smaller hypoechoic lesions seen throughout concerning for metastatic disease. " Negative for gallstones, common bile duct dilation, or Back's sign. Evaluation of transaminitis by Heme-Onc noted likely secondary to hypovolemia with concerns if no improvement could be secondary to advancement of malignancy but less likely given recent chemotherapy resumption. Abdominal ultrasound noted concerns for "Mild to moderate right-sided hydronephrosis", Urology evaluation noted kidney obstruction likely secondary from progression of cancer and after discussion with the patient, patient opted for conservative management at the time.  Discussion with hepatology, Interventional Radiology, Heme-Onc regarding worsened liver function.  Concerns for progression of metastatic disease, goals of care discussions ongoing.  Renal function continued to worsen despite albumin trial.  Goals for care discussion with the patient, patient now amenable to trial of stent placement for potential improvement in kidney function.  On 10/28/2024, Urology planning for stent placement, given patient's age, underlying comorbidities, acute on chronic issues, poor prognosis, discussed extensively regarding goals of care,-patient expressed full code for now, however open for further goals of care discussion, palliative medicine on board.  Consulted palliative " Medicine.  Status post stent placement on 10/28/2024 for right hydro;   BUN/creatinine still worsening;   Had family meeting along with palliative team on 10/29/2024, given patient's age, underlying comorbidities, advanced malignancy, discussed with Heme-Onc, stated that patient is no longer candidate to proceed with any further cancer treatment/chemo, -- had extensive discussion with patient and family members regarding this, opted for DNR DNI, opted to proceed with hospice at home with [facility];  10/30/2024   Examination of patient done at bedside, appeared alert and oriented x3, denied acute events overnight   Still expressing poor appetite, nausea   Hemodynamically stable   Patient/family agreeable to proceed with hospice   Planning to discharge patient today to home under hospice with [facility], appreciate    Patient/family prefers MediPort removal due to concerns of infection, ordered outpatient referral for intervention Radiology, patient/family agreed with the plan.

## 2024-10-23 NOTE — HPI
68-year-old  woman with history of stage IV ovarian cancer with peritoneal carcinomatosis, chronic hypoxic respiratory failure (on 3-4 L nasal cannula at home), chronic diastolic CHF, hypertension, hyperlipidemia, obesity, ureteral stent placement, pulmonary hypertension, anxiety, cervical spine degenerative disc disease, obstructive sleep apnea, community-acquired pneumonia, chronic kidney disease stage IIIA, transaminitis, anemia, and history of DVT who was sent by Hematology Oncology Dr. Pugh from clinic for hypotension and abnormal labs with acute kidney injury and worsening liver enzymes.  Symptoms were acute onset, moderate severity, and with blood pressure improved with albumin administration given in the emergency department.  Patient denies any associated chest pain, increased shortness of breath, cough, fevers, chills.  She does complain of recurrent episodes of nausea with vomiting.  She has chronic abdominal pain but has noted increasing distention to her abdomen as well as increasing lower extremity swelling.  She denies any diarrhea, constipation, dysuria, or hematuria.  Patient is actively receiving chemotherapy with last dose received 10/15/2024.    Last hospital admit 10/2-10/07/2024 for shortness for breath, new bilateral pleural effusion, pneumonia, and images with worsening metastatic disease.  Patient received IV cefepime.

## 2024-10-24 LAB
ALBUMIN SERPL BCP-MCNC: 2.7 G/DL (ref 3.5–5.2)
ALP SERPL-CCNC: 1072 U/L (ref 40–150)
ALT SERPL W/O P-5'-P-CCNC: 266 U/L (ref 10–44)
ANION GAP SERPL CALC-SCNC: 11 MMOL/L (ref 8–16)
AST SERPL-CCNC: 772 U/L (ref 10–40)
BASOPHILS # BLD AUTO: 0.06 K/UL (ref 0–0.2)
BASOPHILS NFR BLD: 1.4 % (ref 0–1.9)
BILIRUB SERPL-MCNC: 1.7 MG/DL (ref 0.1–1)
BUN SERPL-MCNC: 74 MG/DL (ref 8–23)
CALCIUM SERPL-MCNC: 9 MG/DL (ref 8.7–10.5)
CHLORIDE SERPL-SCNC: 110 MMOL/L (ref 95–110)
CO2 SERPL-SCNC: 22 MMOL/L (ref 23–29)
CREAT SERPL-MCNC: 1.9 MG/DL (ref 0.5–1.4)
DIFFERENTIAL METHOD BLD: ABNORMAL
EOSINOPHIL # BLD AUTO: 0 K/UL (ref 0–0.5)
EOSINOPHIL NFR BLD: 0.9 % (ref 0–8)
ERYTHROCYTE [DISTWIDTH] IN BLOOD BY AUTOMATED COUNT: 19.5 % (ref 11.5–14.5)
EST. GFR  (NO RACE VARIABLE): 28 ML/MIN/1.73 M^2
GLUCOSE SERPL-MCNC: 82 MG/DL (ref 70–110)
HCT VFR BLD AUTO: 38 % (ref 37–48.5)
HGB BLD-MCNC: 12 G/DL (ref 12–16)
IMM GRANULOCYTES # BLD AUTO: 0.02 K/UL (ref 0–0.04)
IMM GRANULOCYTES NFR BLD AUTO: 0.5 % (ref 0–0.5)
LYMPHOCYTES # BLD AUTO: 0.6 K/UL (ref 1–4.8)
LYMPHOCYTES NFR BLD: 14.1 % (ref 18–48)
MAGNESIUM SERPL-MCNC: 2.6 MG/DL (ref 1.6–2.6)
MCH RBC QN AUTO: 27.8 PG (ref 27–31)
MCHC RBC AUTO-ENTMCNC: 31.6 G/DL (ref 32–36)
MCV RBC AUTO: 88 FL (ref 82–98)
MONOCYTES # BLD AUTO: 0.2 K/UL (ref 0.3–1)
MONOCYTES NFR BLD: 5.2 % (ref 4–15)
NEUTROPHILS # BLD AUTO: 3.4 K/UL (ref 1.8–7.7)
NEUTROPHILS NFR BLD: 77.9 % (ref 38–73)
NRBC BLD-RTO: 0 /100 WBC
OHS QRS DURATION: 74 MS
OHS QTC CALCULATION: 448 MS
PHOSPHATE SERPL-MCNC: 3.3 MG/DL (ref 2.7–4.5)
PLATELET # BLD AUTO: 239 K/UL (ref 150–450)
PMV BLD AUTO: 9.9 FL (ref 9.2–12.9)
POTASSIUM SERPL-SCNC: 4.4 MMOL/L (ref 3.5–5.1)
PROT SERPL-MCNC: 6 G/DL (ref 6–8.4)
RBC # BLD AUTO: 4.32 M/UL (ref 4–5.4)
SODIUM SERPL-SCNC: 143 MMOL/L (ref 136–145)
WBC # BLD AUTO: 4.39 K/UL (ref 3.9–12.7)

## 2024-10-24 PROCEDURE — 21400001 HC TELEMETRY ROOM: Mod: HCNC

## 2024-10-24 PROCEDURE — 85025 COMPLETE CBC W/AUTO DIFF WBC: CPT | Mod: HCNC | Performed by: STUDENT IN AN ORGANIZED HEALTH CARE EDUCATION/TRAINING PROGRAM

## 2024-10-24 PROCEDURE — 99900035 HC TECH TIME PER 15 MIN (STAT): Mod: HCNC

## 2024-10-24 PROCEDURE — 94761 N-INVAS EAR/PLS OXIMETRY MLT: CPT | Mod: HCNC

## 2024-10-24 PROCEDURE — 84100 ASSAY OF PHOSPHORUS: CPT | Mod: HCNC | Performed by: STUDENT IN AN ORGANIZED HEALTH CARE EDUCATION/TRAINING PROGRAM

## 2024-10-24 PROCEDURE — 99222 1ST HOSP IP/OBS MODERATE 55: CPT | Mod: HCNC,,, | Performed by: UROLOGY

## 2024-10-24 PROCEDURE — 63600175 PHARM REV CODE 636 W HCPCS: Mod: HCNC | Performed by: STUDENT IN AN ORGANIZED HEALTH CARE EDUCATION/TRAINING PROGRAM

## 2024-10-24 PROCEDURE — 27000221 HC OXYGEN, UP TO 24 HOURS: Mod: HCNC

## 2024-10-24 PROCEDURE — 25000003 PHARM REV CODE 250: Mod: HCNC | Performed by: INTERNAL MEDICINE

## 2024-10-24 PROCEDURE — 80053 COMPREHEN METABOLIC PANEL: CPT | Mod: HCNC | Performed by: STUDENT IN AN ORGANIZED HEALTH CARE EDUCATION/TRAINING PROGRAM

## 2024-10-24 PROCEDURE — 83735 ASSAY OF MAGNESIUM: CPT | Mod: HCNC | Performed by: STUDENT IN AN ORGANIZED HEALTH CARE EDUCATION/TRAINING PROGRAM

## 2024-10-24 PROCEDURE — 25000003 PHARM REV CODE 250: Mod: HCNC | Performed by: STUDENT IN AN ORGANIZED HEALTH CARE EDUCATION/TRAINING PROGRAM

## 2024-10-24 PROCEDURE — 94799 UNLISTED PULMONARY SVC/PX: CPT | Mod: HCNC

## 2024-10-24 PROCEDURE — 99221 1ST HOSP IP/OBS SF/LOW 40: CPT | Mod: HCNC,95,, | Performed by: INTERNAL MEDICINE

## 2024-10-24 RX ORDER — MUPIROCIN 20 MG/G
OINTMENT TOPICAL 2 TIMES DAILY
Status: COMPLETED | OUTPATIENT
Start: 2024-10-24 | End: 2024-10-28

## 2024-10-24 RX ORDER — ENOXAPARIN SODIUM 150 MG/ML
1 INJECTION SUBCUTANEOUS EVERY 12 HOURS
Status: DISCONTINUED | OUTPATIENT
Start: 2024-10-24 | End: 2024-10-26

## 2024-10-24 RX ADMIN — SERTRALINE HYDROCHLORIDE 25 MG: 25 TABLET ORAL at 08:10

## 2024-10-24 RX ADMIN — ENOXAPARIN SODIUM 105 MG: 120 INJECTION SUBCUTANEOUS at 12:10

## 2024-10-24 RX ADMIN — MUPIROCIN: 20 OINTMENT TOPICAL at 10:10

## 2024-10-24 RX ADMIN — MUPIROCIN: 20 OINTMENT TOPICAL at 09:10

## 2024-10-24 NOTE — PROGRESS NOTES
Pharmacist Renal Dose Adjustment Note    Casie Gaines is a 68 y.o. female being treated with the medication enoxaparin.    Patient Data:    Vital Signs (Most Recent):  Temp: 98 °F (36.7 °C) (10/24/24 0720)  Pulse: 87 (10/24/24 1049)  Resp: 18 (10/24/24 0720)  BP: 110/65 (10/24/24 0721)  SpO2: (!) 93 % (10/24/24 0721) Vital Signs (72h Range):  Temp:  [97 °F (36.1 °C)-98.6 °F (37 °C)]   Pulse:  [70-89]   Resp:  [16-23]   BP: ()/(51-65)   SpO2:  [91 %-100 %]      Recent Labs   Lab 10/22/24  1829 10/23/24  0411 10/24/24  0951   CREATININE 2.4* 2.3* 1.9*     Serum creatinine: 1.9 mg/dL (H) 10/24/24 0951  Estimated creatinine clearance: 34.8 mL/min (A)    Medication: enoxaparin 1 mg/kg q24h will be changed to enoxaparin 1 mg/kg q12h for CrCl > 30 mL/min.    Pharmacist's Name: Katherine Farmer

## 2024-10-24 NOTE — PLAN OF CARE
"   10/24/24 1525   Rounds   Attendance Provider;;Charge nurse;Physical therapist   Discharge Plan A Home with family   Why the patient remains in the hospital Requires continued medical care   Transition of Care Barriers None       Assessment/Plan:      * Acute renal failure superimposed on stage 3a chronic kidney disease  MIRELA is likely due to pre-renal azotemia due to dehydration related to nausea with vomiting, hypotension, inadequate oral intake, and likely also related to progressive metastatic disease. Baseline creatinine is  1.1-1.3 . Most recent creatinine and eGFR are listed below.        Recent Labs     10/22/24  1015 10/22/24  1829 10/23/24  0411   CREATININE 2.6* 2.4* 2.3*   EGFRNORACEVR 19* 21* 22*          Plan  - MIRELA is worsening. Will continue current treatment--> patient received albumin 25% 25 g IV x1 dose in the emergency department. Patient is in need of IV fluids for the treatment of MIRELA.  Due to a shortage of IV fluids, patient to receive oral fluids.  Patient must receive this treatment in a hospital location due to the risk of adverse effects, insufficient response to treatment, or other potential complications of disease. We will encourage increased fluid intake, Pedialyte 600 mL p.o. q.4 hours ordered  - Avoid nephrotoxins and renally dose meds for GFR listed above  - Monitor urine output, serial BMP, and adjust therapy as needed  - Patient with history of ureteral stent placement(records unavailable),  Abdominal ultrasound noted concerns for "Mild to moderate right-sided hydronephrosis", Urology consulted for further evaluation.       Normocytic anemia  Anemia is likely due to chronic disease due to Malignancy and chronic kidney disease. Most recent hemoglobin and hematocrit are listed below.        Recent Labs     10/22/24  1015 10/22/24  1829 10/23/24  0411   HGB 12.7 10.6* 10.6*   HCT 41.7 33.8* 33.8*         Plan  - Monitor serial CBC: Daily  - Transfuse PRBC if patient " becomes hemodynamically unstable, symptomatic or H/H drops below 7/21.  - Patient has not received any PRBC transfusions to date  - Patient's anemia is currently stable        History of DVT (deep vein thrombosis)  -recent left lower extremity venous ultrasound 10/15/2024 was negative for DVT  -bilateral lower extremity edema most likely related to metastatic disease and hypoalbuminemia  -treatment dose Lovenox (renally dosed) for now     PLAN:  Patient at high-risk of thrombosis due to malignancy, follow up repeat ultrasound        Chronic diastolic CHF (congestive heart failure)  Patient has Diastolic (HFpEF) heart failure that is Chronic. On presentation their CHF was well compensated. Most recent BNP and echo results are listed below.      Recent Labs     10/22/24  1829   BNP 27         Latest ECHO  Results for orders placed during the hospital encounter of 09/03/24     Echo     Interpretation Summary    Left Ventricle: The left ventricle is normal in size. Normal wall thickness. There is normal systolic function with a visually estimated ejection fraction of 60 - 65%. Biplane (2D) method of discs ejection fraction is 56%. Global longitudinal strain is -19.5%. There is normal diastolic function.    Right Ventricle: Normal right ventricular cavity size. Wall thickness is normal. Systolic function is normal.    IVC/SVC: Normal venous pressure at 3 mmHg.     Malignant neoplasm of both ovaries     Baseline echo 9/3/2024  NORM -19.47%  Biplane 56%     Current Heart Failure Medications  hydrALAZINE injection 10 mg, Every 6 hours PRN, Intravenous     Plan  - Monitor strict I&Os and daily weights.    - Place on telemetry  - Cardiology has not been consulted  - The patient's volume status is at their baseline     Chronic respiratory failure with hypoxia  Patient with Hypoxic Respiratory failure which is Chronic.  she is on home oxygen at 3-4 LPM. Supplemental oxygen was provided and noted- Oxygen Concentration (%):  [36]  "36     -CXR this admit with " Similar degree of vascular congestion versus reticular interstitial changes throughout the lungs with patchy ground-glass opacities.  Stable small to moderate left effusion.  New small right effusion.  Differential considerations would include chronic or recurrent CHF versus interstitial infectious process such as atypical viral pneumonia.  Clinical correlation is advised."  -recent CTA of chest 10/02/2024 was reviewed  -initial labs notable for white blood cell count 4.10, lactic acid level 1.0, BNP 27, afebrile  -procalcitonin level elevated, however utility limited given MIRELA        -no antibiotics warranted at this time  -no diuretics warranted at this time  -continuous pulse oximetry monitoring, O2 supplementation, incentive spirometry, and p.r.n. albuterol inhaler     Elevated liver enzymes  Upon admission, alk phos 1018, total bilirubin 1.2, , , INR 1.2, BNP 27, lactic acid level 1.0, creatinine 2.4  -Evaluation of transaminitis with hepatitis panel negative, and abdominal ultrasound noting previously seen "severely heterogeneous echotexture throughout the liver with poorly defined mass within the left hepatic lobe measuring up to 8.8 cm. Additional smaller hypoechoic lesions seen throughout concerning for metastatic disease. " Negative for gallstones, common bile duct dilation, or Back's sign.   -Evaluation of transaminitis by Heme-Onc noted likely secondary to hypovolemia with concerns if no improvement could be secondary to advancement of malignancy but less likely given recent chemotherapy resumption.        -hold Crestor and avoid hepatotoxic agents  -recent CT scan of abdomen/pelvis and PET scan were reviewed  -trend on CMP with volume repletion.  If no improvement, will consider hepatology consult        Hypotension due to hypovolemia  -hypotension improved with IV albumin given in the emergency department  -hold Norvasc, atenolol, Benicar/HCT  -limit " "sedating agents        Nausea & vomiting  -bland diet, Pedialyte scheduled, IV albumin x1 dose  -p.r.n. 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 New moderate-sized bilateral pleural effusions as described above with adjacent atelectasis or consolidation.No enlarged mediastinal and hilar lymph nodes. Intralobular septal thickening and associated groundglass opacity bilaterally felt to represent edema.New irregular soft tissue mass in the left upper quadrant concerning for carcinomatosis."  -CT scan of abdomen and pelvis 10/06/2024 with Widespread metastatic disease suspected with abnormal lymph nodes within the retroperitoneum, jovan hepatis, mediastinum as well as multiple abnormal liver lesions and focal thickening within the large bowel. Patient needs tissue sampling of liver lesion. Recommend colonoscopy and possible biopsy of sigmoid lesion."  -nuclear medicine PET scan 10/11/2024 with Overall there has been progression of disease. There is continued hypermetabolic adenopathy within the neck, chest (mediastinum and chest wall).  Some of these areas are stable with some nodes slightly diminished and some decreased degrees of FDG uptake within some of these nodes. However, there is extensive progression of peritoneal carcinomatosis throughout the abdomen and pelvis with increasing number and size of peritoneal hypermetabolic nodularity and increasing amount of ascites.  There is also large amount heterogeneous FDG uptake within the left hepatic lobe concerning for hepatic metastatic disease which is new. there has also been interval development moderate right-sided hydronephrosis and hydroureter with ureter likely being " "obstructed by peritoneal implants. increasing small to moderate bilateral loculated pleural effusions.  Extensive mixed interstitial and ground-glass infiltrates throughout the lungs.  Question pneumonia/post treatment related changes.  Lymphangitic carcinomatosis could appear similar"        -Heme-Onc consulted and after goals of care discussion, plan to continue with treatment at this time.  Follow up recs  -p.r.n. pain control              Peritoneal carcinomatosis  See discussion for bilateral ovarian cancer     Hyperlipidemia  -hold Crestor in the setting of worsening LFTs              VTE Risk Mitigation (From admission, onward)              Ordered       enoxaparin injection 105 mg  Every 24 hours         10/22/24 2032       IP VTE HIGH RISK PATIENT  Once         10/22/24 2032       Place sequential compression device  Until discontinued         10/22/24 2032                          Discharge Planning   EVE:      Code Status: Full Code   Is the patient medically ready for discharge?:     Reason for patient still in hospital (select all that apply): Patient trending condition, Laboratory test, Treatment, Imaging, and Consult recommendations  Discharge Plan A: Home with family      "

## 2024-10-24 NOTE — CONSULTS
[facility]  Hepatology  Telemedicine Consult Note    Patient Name: Casie Gaines  MRN: [id number]  Admission Date: 10/22/2024  Hospital Length of Stay: 2 days  Attending Provider: Anival Hernandez DO   Primary Care Physician: Rossana Ang MD  Principal Problem:Acute renal failure superimposed on stage 3a chronic kidney disease    Inpatient Consult to Telemedicine - Hepatology  Consult performed by: Nanci Monroe MD  Consult ordered by: Anival Hernandez DO  Reason for consult: Abnormal LFTs  Assessment/Recommendations: Hospice care        Subjective:     Transplant status: No    HPI:  68F with multiple med issues including stage IV ovarian cancern with extensive liver metastatic disease. Hepatology was consulted because liver tests are worsening. She denies any significant abdominal pain.    No evidence of liver decompensation: no ascites, confusion or GI bleeding.      Review of Systems   Constitutional:  Positive for activity change.   HENT: Negative.     Eyes: Negative.    Respiratory:  Positive for shortness of breath.    Cardiovascular:  Positive for leg swelling (improved).   Gastrointestinal:  Positive for abdominal distention.   Genitourinary: Negative.    Musculoskeletal: Negative.    Skin: Negative.    Neurological: Negative.    Psychiatric/Behavioral: Negative.         Past Medical History:   Diagnosis Date    Anemia     Anxiety     Arthritis     knees    Cancer     ovarian    CHF (congestive heart failure)     Hx antineoplastic chemotherapy     last 2019    Hyperlipemia     Hypertension     Neck pain     Ovarian cancer 2019    CHEMO    Peritoneal carcinomatosis 2019       Past Surgical History:   Procedure Laterality Date    breast reduction  10/02/2018    CATARACT EXTRACTION Bilateral     2023     SECTION      X 1    COLONOSCOPY N/A 2021    Procedure: COLONOSCOPY;  Surgeon: Paulette Rojas MD;  Location: [facility];  Service: Gastroenterology;   Laterality: N/A;    CYSTOSCOPY W/ URETERAL STENT PLACEMENT Right 2019    Procedure: CYSTOSCOPY, WITH URETERAL STENT INSERTION;  Surgeon: Timmy Santiago IV, MD;  Location: [facility] OR;  Service: Urology;  Laterality: Right;    CYSTOSCOPY W/ URETERAL STENT REMOVAL  10/04/2019    DILATION AND CURETTAGE OF UTERUS      HYSTERECTOMY      RALH for fibroids (still has ovaries)    INSERTION OF VENOUS ACCESS PORT Left 2019    Procedure: INSERTION, VENOUS ACCESS PORT;  Surgeon: Ulisses Monzon MD;  Location: [facility] OR;  Service: General;  Laterality: Left;  Left internal jugular     LYSIS OF ADHESIONS OF URETER N/A 08/15/2019    Procedure: URETEROLYSIS;  Surgeon: Ismael Juarez MD;  Location: [facility] OR;  Service: OB/GYN;  Laterality: N/A;    OMENTECTOMY N/A 08/15/2019    Procedure: OMENTECTOMY;  Surgeon: Ismael Juarez MD;  Location: [facility];  Service: OB/GYN;  Laterality: N/A;    OOPHORECTOMY      RETROGRADE PYELOGRAPHY Right 2019    Procedure: PYELOGRAM, RETROGRADE;  Surgeon: Timmy Santiago IV, MD;  Location: [facility] OR;  Service: Urology;  Laterality: Right;    ROBOT-ASSISTED LAPAROSCOPIC SALPINGO-OOPHORECTOMY USING DA TIA XI Bilateral 08/15/2019    Procedure: XI ROBOTIC SALPINGO-OOPHORECTOMY;  Surgeon: Ismael Juarez MD;  Location: [facility];  Service: OB/GYN;  Laterality: Bilateral;    TOTAL REDUCTION MAMMOPLASTY  2018    TUBAL LIGATION         Family history of liver disease: n/a    Review of patient's allergies indicates:  No Known Allergies      Tobacco Use    Smoking status: Former     Current packs/day: 0.00     Average packs/day: 1 pack/day for 25.0 years (25.0 ttl pk-yrs)     Types: Cigarettes     Start date: 10/1/1993     Quit date: 10/1/2018     Years since quittin.0    Smokeless tobacco: Never    Tobacco comments:     States started quit 2 months ago after 30 years   Substance and Sexual Activity    Alcohol use: Yes     Comment: occasionally  No alcohol 72h prior to sx    Drug use: No    Sexual  activity: Not Currently     Partners: Male     Comment: hyst; mut monog       Facility-Administered Medications Prior to Admission   Medication Dose Route Frequency Provider Last Rate Last Admin    alteplase injection 2 mg  2 mg Intra-Catheter PRN          Medications Prior to Admission   Medication Sig Dispense Refill Last Dose/Taking    amLODIPine (NORVASC) 5 MG tablet Take 2 tablets (10 mg total) by mouth once daily. 180 tablet 3 10/21/2024    atenoloL (TENORMIN) 25 MG tablet Take 25 mg by mouth once daily.   10/21/2024    biotin 1 mg tablet Take 1,000 mcg by mouth once daily.    10/21/2024    diclofenac sodium (VOLTAREN) 1 % Gel Apply once a day to back as needed 100 g 1 10/21/2024    gabapentin (NEURONTIN) 300 MG capsule Take 2 capsules (600 mg total) by mouth 3 (three) times daily. 180 capsule 3 10/21/2024    ginkgo biloba 40 mg Tab Take 40 mg by mouth once daily.   10/21/2024    multivitamin with minerals tablet Take 1 tablet by mouth once daily.    10/21/2024    olmesartan-hydrochlorothiazide (BENICAR HCT) 40-25 mg per tablet Take 1 tablet by mouth once daily. 90 tablet 1 10/21/2024    ondansetron (ZOFRAN-ODT) 8 MG TbDL Take 1 tablet (8 mg total) by mouth every 8 (eight) hours. 90 tablet 5 10/21/2024    rosuvastatin (CRESTOR) 10 MG tablet Take 1 tablet (10 mg total) by mouth once daily. 90 tablet 3 10/21/2024    sertraline (ZOLOFT) 25 MG tablet Take 1 tablet (25 mg total) by mouth once daily. 90 tablet 0 10/21/2024    albuterol (PROVENTIL/VENTOLIN HFA) 90 mcg/actuation inhaler Inhale 2 puffs into the lungs every 4 (four) hours as needed for Wheezing. Rescue 18 g 1 More than a month    ALPRAZolam (XANAX) 0.25 MG tablet Take 1 tablet (0.25 mg total) by mouth 3 (three) times daily as needed for Anxiety. 60 tablet 2 More than a month    cetirizine (ZYRTEC) 10 MG tablet Take 1 tablet (10 mg total) by mouth once daily. (Patient taking differently: Take 10 mg by mouth as needed for Allergies.) 30 tablet 5 Unknown     EPINEPHrine (EPIPEN) 0.3 mg/0.3 mL AtIn Inject 0.3 mLs (0.3 mg total) into the muscle once. for 1 dose 2 each 0     fluticasone propionate (FLONASE) 50 mcg/actuation nasal spray 1 spray (50 mcg total) by Each Nostril route every 12 (twelve) hours as needed for Rhinitis. 16 g 0 More than a month    prochlorperazine (COMPAZINE) 5 MG tablet Take 1 tablet (5 mg total) by mouth every 6 (six) hours as needed for Nausea. 20 tablet 11 Unknown    traMADoL (ULTRAM) 50 mg tablet Take 1 tablet (50 mg total) by mouth every 12 (twelve) hours as needed for Pain. 30 tablet 0 More than a month       Objective:     Vital Signs (Most Recent):  Temp: 98 °F (36.7 °C) (10/24/24 1218)  Pulse: 88 (10/24/24 1218)  Resp: 18 (10/24/24 1218)  BP: (!) 113/57 (10/24/24 1218)  SpO2: 100 % (10/24/24 1218) Vital Signs (24h Range):  Temp:  [97.5 °F (36.4 °C)-98.6 °F (37 °C)] 98 °F (36.7 °C)  Pulse:  [76-89] 88  Resp:  [18] 18  SpO2:  [91 %-100 %] 100 %  BP: (103-113)/(55-65) 113/57     Weight: 104.6 kg (230 lb 9.6 oz) (10/23/24 0600)  Body mass index is 36.12 kg/m².       Physical Exam  Vitals reviewed.   Constitutional:       General: She is not in acute distress.     Appearance: She is well-developed.   HENT:      Head: Normocephalic and atraumatic.      Mouth/Throat:      Pharynx: No oropharyngeal exudate.   Eyes:      General: No scleral icterus.        Right eye: No discharge.         Left eye: No discharge.      Conjunctiva/sclera: Conjunctivae normal.      Pupils: Pupils are equal, round, and reactive to light.   Pulmonary:      Effort: Pulmonary effort is normal. No respiratory distress.      Breath sounds: No wheezing.   Abdominal:      General: There is no distension.      Tenderness: There is no abdominal tenderness.   Neurological:      Mental Status: She is alert and oriented to person, place, and time.   Psychiatric:         Behavior: Behavior normal.            MELD 3.0: 20 at 10/24/2024  9:51 AM  MELD-Na: 18 at 10/24/2024  9:51  AM  Calculated from:  Serum Creatinine: 1.9 mg/dL at 10/24/2024  9:51 AM  Serum Sodium: 143 mmol/L (Using max of 137 mmol/L) at 10/24/2024  9:51 AM  Total Bilirubin: 1.7 mg/dL at 10/24/2024  9:51 AM  Serum Albumin: 2.7 g/dL at 10/24/2024  9:51 AM  INR(ratio): 1.3 at 10/23/2024  4:11 AM  Age at listing (hypothetical): 68 years  Sex: Female at 10/24/2024  9:51 AM      Significant Labs:  Labs within the past month have been reviewed.    Significant Imaging:  US: Reviewed  CT: Reviewed    Assessment/Plan:     GI  Transaminitis  Worsening likely related to extensive metastatic ovarian cancer in the liver. Imaging reviewed and discussed with Dr. Fermin who notes extensive metastatic disease as seen on imaging without an intervenable process. Overall prognosis is poor as it seems patient is dying from advanced cancer.  -Case discussed with hospital med, IR and heme/onc  -No specific hepatology recommendations, oncology to consider hospice        Thank you for your consult. I will sign off. Please contact us if you have any additional questions.    The patient location is: [place]  The chief complaint leading to consultation is: See above in note    Visit type: audiovisual    Face to Face time with patient: 10 minutes  20 minutes of total time spent on the encounter, which includes face to face time and non-face to face time preparing to see the patient (eg, review of tests), Obtaining and/or reviewing separately obtained history, Documenting clinical information in the electronic or other health record, Independently interpreting results (not separately reported) and communicating results to the patient/family/caregiver, or Care coordination (not separately reported).         Each patient to whom he or she provides medical services by telemedicine is:  (1) informed of the relationship between the physician and patient and the respective role of any other health care provider with respect to management of the patient; and  (2) notified that he or she may decline to receive medical services by telemedicine and may withdraw from such care at any time.      Nanci Monroe MD  Hepatology  [facility]

## 2024-10-24 NOTE — CONSULTS
Chief Complaint   Patient presents with    Abnormal Lab     Pt sent by Dr. House for further workup d/t elevated ALP, AST, ALT, and creatinine. Pt has swelling and pain to bilateral legs. Pt does not do dialysis. (+) SOB; pt wears 4L o2 at home. Pt reports normally wearing 3L o2 but changed to 4L today. Pt's BP earlier today was 88/58. (-) chest pain, fever, chills, N/V/D       History of Present Illness:   Casie Gaines is a 68 y.o. female with Stage IV serous ovarian cancer who is currently admitted due to MIRELA and acute liver injury. She was told to come to the ED by her oncologist due to abnormal labs. She was noted on CT scan done earlier this month to have R hydronephrosis. She reports h/o ureteral stent in 2019, which was poorly tolerated and was ultimately removed by an outside urologist due to pain. Upon review of the chart, she had R hydronephrosis at that time due to ovarian pelvic mass. Hydronephrosis did resolve following surgical management of the pelvic mass. Recurrence of the hydronephrosis is new on scans done this month. She reports that since admission, she is feeling better. She has been hydrating orally. No dysuria or fever. She does have some right flank and abdominal pain. She is currently wanting to continue with any treatment recommended for her ovarian cancer.       Review of Systems   Constitutional:  Negative for chills and fever.   Respiratory:  Positive for shortness of breath.    Gastrointestinal:  Positive for abdominal pain.   Genitourinary:  Positive for flank pain. Negative for hematuria and vaginal bleeding.   All other systems reviewed and are negative.    Past Medical History:   Diagnosis Date    Anemia     Anxiety     Arthritis     knees    Cancer     ovarian    CHF (congestive heart failure)     Hx antineoplastic chemotherapy     last 6/2019    Hyperlipemia     Hypertension     Neck pain     Ovarian cancer 2019    CHEMO    Peritoneal carcinomatosis 01/26/2019       Past  Surgical History:   Procedure Laterality Date    breast reduction  10/02/2018    CATARACT EXTRACTION Bilateral     2023     SECTION      X 1    COLONOSCOPY N/A 2021    Procedure: COLONOSCOPY;  Surgeon: Paulette Rojas MD;  Location: [facility];  Service: Gastroenterology;  Laterality: N/A;    CYSTOSCOPY W/ URETERAL STENT PLACEMENT Right 2019    Procedure: CYSTOSCOPY, WITH URETERAL STENT INSERTION;  Surgeon: Timmy Santiago IV, MD;  Location: [facility] OR;  Service: Urology;  Laterality: Right;    CYSTOSCOPY W/ URETERAL STENT REMOVAL  10/04/2019    DILATION AND CURETTAGE OF UTERUS      HYSTERECTOMY      RALH for fibroids (still has ovaries)    INSERTION OF VENOUS ACCESS PORT Left 2019    Procedure: INSERTION, VENOUS ACCESS PORT;  Surgeon: Ulisses Monzon MD;  Location: [facility];  Service: General;  Laterality: Left;  Left internal jugular     LYSIS OF ADHESIONS OF URETER N/A 08/15/2019    Procedure: URETEROLYSIS;  Surgeon: Ismael Juarez MD;  Location: [facility];  Service: OB/GYN;  Laterality: N/A;    OMENTECTOMY N/A 08/15/2019    Procedure: OMENTECTOMY;  Surgeon: Ismael Juarez MD;  Location: [facility];  Service: OB/GYN;  Laterality: N/A;    OOPHORECTOMY      RETROGRADE PYELOGRAPHY Right 2019    Procedure: PYELOGRAM, RETROGRADE;  Surgeon: Timmy Santiago IV, MD;  Location: [facility];  Service: Urology;  Laterality: Right;    ROBOT-ASSISTED LAPAROSCOPIC SALPINGO-OOPHORECTOMY USING DA TIA XI Bilateral 08/15/2019    Procedure: XI ROBOTIC SALPINGO-OOPHORECTOMY;  Surgeon: Ismael Juarez MD;  Location: [facility];  Service: OB/GYN;  Laterality: Bilateral;    TOTAL REDUCTION MAMMOPLASTY  2018    TUBAL LIGATION         Family History   Problem Relation Name Age of Onset    Glaucoma Mother      No Known Problems Father      Colon cancer Brother      Breast cancer Maternal Aunt      Breast cancer Paternal Aunt      Ovarian cancer Paternal Aunt      Anesthesia problems Other cousin      Thrombophilia Neg Hx         Social History     Tobacco Use    Smoking status: Former     Current packs/day: 0.00     Average packs/day: 1 pack/day for 25.0 years (25.0 ttl pk-yrs)     Types: Cigarettes     Start date: 10/1/1993     Quit date: 10/1/2018     Years since quittin.0    Smokeless tobacco: Never    Tobacco comments:     States started quit 2 months ago after 30 years   Substance Use Topics    Alcohol use: Yes     Comment: occasionally  No alcohol 72h prior to sx    Drug use: No       Current Facility-Administered Medications   Medication Dose Route Frequency Provider Last Rate Last Admin    albuterol nebulizer solution 2.5 mg  2.5 mg Nebulization Q4H PRN Hue Alfred MD        ALPRAZolam tablet 0.25 mg  0.25 mg Oral TID PRN Hue Alfred MD        alteplase injection 2 mg  2 mg Intra-Catheter PRN Hue Alfred MD        dextrose 10% bolus 125 mL 125 mL  12.5 g Intravenous PRN Hue Alfred MD        dextrose 10% bolus 250 mL 250 mL  25 g Intravenous PRN Hue Alfred MD        electrolytes-dextrose (Pedialyte) oral solution 600 mL  600 mL Oral Q4H Hue Alfred MD   600 mL at 10/22/24 2139    enoxaparin injection 105 mg  1 mg/kg Subcutaneous Q24H (prophylaxis, 1700) Hue Alfred MD   105 mg at 10/23/24 1653    fluticasone propionate 50 mcg/actuation nasal spray 50 mcg  1 spray Each Nostril Q12H PRN Hue Alfred MD        glucagon (human recombinant) injection 1 mg  1 mg Intramuscular PRN Hue Alfred MD        glucose chewable tablet 16 g  16 g Oral PRN Hue Alfred MD        glucose chewable tablet 24 g  24 g Oral PRN Hue Alfred MD        hydrALAZINE injection 10 mg  10 mg Intravenous Q6H PRN Hue Alfred MD        naloxone 0.4 mg/mL injection 0.02 mg  0.02 mg Intravenous PRHue Wright MD        ondansetron injection 4 mg  4 mg Intravenous Q6H PRN Hue Alfred MD        oxyCODONE immediate release tablet 5 mg  5 mg Oral Q6H PRN Hue Alfred  MD        sertraline tablet 25 mg  25 mg Oral Daily Hue Alfred MD   25 mg at 10/23/24 0844    sodium chloride 0.9% flush 10 mL  10 mL Intravenous Q12H PRN Hue Alfred MD           Review of patient's allergies indicates:  No Known Allergies    Physical Exam  Vitals:    10/24/24 0721   BP: 110/65   Pulse: 87   Resp:    Temp:      General: Well-developed, well-nourished, in no acute distress  HEENT: Normocephalic, atraumatic, extraocular movements intact  Neck: Supple, no supraclavicular or cervical lymphadenopathy, trachea midline  Respirations: even and unlabored  Back: midline spine, No CVA tenderness  Abdomen: soft, right sided tenderness, no rebound or guarding, well-healed midline scar  Extremities: moves all equally, no clubbing, cyanosis, 1+edema to LE bilaterally  Skin: Warm and dry. No lesions  Psych: normal affect  Neuro: Alert and oriented x 3. Cranial nerves II-XII intact      Urinalysis  pH, UA   Date Value Ref Range Status   10/22/2024 5.0 5.0 - 8.0 Final     Protein, UA   Date Value Ref Range Status   10/22/2024 1+ (A) Negative Final     Comment:     Recommend a 24 hour urine protein or a urine   protein/creatinine ratio if globulin induced proteinuria is  clinically suspected.       Glucose, UA   Date Value Ref Range Status   10/22/2024 Negative Negative Final     Occult Blood UA   Date Value Ref Range Status   10/22/2024 2+ (A) Negative Final     Nitrite, UA   Date Value Ref Range Status   10/22/2024 Negative Negative Final     Leukocytes, UA   Date Value Ref Range Status   10/22/2024 Negative Negative Final     BMP  Lab Results   Component Value Date     10/23/2024    K 4.6 10/23/2024     10/23/2024    CO2 20 (L) 10/23/2024    BUN 82 (H) 10/23/2024    CREATININE 2.3 (H) 10/23/2024    CALCIUM 8.7 10/23/2024    ANIONGAP 11 10/23/2024    EGFRNORACEVR 22 (A) 10/23/2024     Lab Results   Component Value Date    WBC 3.56 (L) 10/23/2024    HGB 10.6 (L) 10/23/2024    HCT 33.8 (L)  10/23/2024    MCV 88 10/23/2024     10/23/2024     PET CT 10/11/24 personally reviewed and agree with official read:   Impression:     Overall there has been progression of disease.     -There is continued hypermetabolic adenopathy within the neck, chest (mediastinum and chest wall).  Some of these areas are stable with some nodes slightly diminished and some decreased degrees of FDG uptake within some of these nodes.     -However, there is extensive progression of peritoneal carcinomatosis throughout the abdomen and pelvis with increasing number and size of peritoneal hypermetabolic nodularity and increasing amount of ascites.  There is also large amount heterogeneous FDG uptake within the left hepatic lobe concerning for hepatic metastatic disease which is new.     -there has also been interval development moderate right-sided hydronephrosis and hydroureter with ureter likely being obstructed by peritoneal implants.     -increasing small to moderate bilateral loculated pleural effusions.  Extensive mixed interstitial and ground-glass infiltrates throughout the lungs.  Question pneumonia/post treatment related changes.  Lymphangitic carcinomatosis could appear similar    Assessment:  Right Acquired ureteral obstruction  MIRELA  Stage IV Ovarian Cancer    Plan:     Discussed with pt that her right kidney is obstructed, likely from progression of her cancer. Discussed that this is likely contributing to her MIRELA, though may not be the only factor. I have recommended drainage of the kidney, either with cysto/R stent placement or R nephrostomy tube placement if continuation of her cancer treatment is her goal. We discussed that the alternative is to leave the kidney undrained and continue to hydrate in hopes for improvement in renal function. However, we discussed that if her renal function doesn't improve, she may no longer be a candidate for chemotherapy. We also discussed that her renal obstruction may be  contributing to her edema and dyspnea. She would like to hydrate and monitor labs for the next several days to a week and continue to observe. She states that she may be in favor of drainage in a week if her labs don't improve. Will sign off for now. Please reconsult if pt decides she wants treatment.

## 2024-10-24 NOTE — PROGRESS NOTES
Hematology/Oncology  Progress Note   Admission Date: 10/22/2024  Hospital Length of Stay: 2  Code Status: FULL CODE  Attending Provider:  Anival Hernandez DO  Consulting Provider: Torri Pugh MD  Primary Care Physician: Rossana Ang MD   Principal Problem: Acute renal failure superimposed on stage 3a chronic kidney disease    Reason for Consult: Disease Progression   Subjective    The patient reports NAEON. She feels better and is increasing her PO intake.  She has no acute complaints this morning.     Review of Systems   Constitutional:  Negative for chills, fever and malaise/fatigue.   Respiratory:  Negative for shortness of breath.    Cardiovascular:  Positive for leg swelling. Negative for chest pain.   Gastrointestinal:  Negative for abdominal pain, constipation, diarrhea, nausea and vomiting.   Musculoskeletal:  Negative for myalgias.   Neurological:  Negative for headaches.   Psychiatric/Behavioral:  The patient is not nervous/anxious.      Objective   Previous HPI  Casie Gaines is a 68 y.o. 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  Her treatment was interrupted due to hospitalization for PNA.      On evaluation in clinic yesterday the patient was noted to have acute liver injury as well as MIRELA and was directed to go to the ED for hospital admission.  She was severely dehydrated due to poor p.o. intake.  Today she does appear clinically improved however lab indices still abnormal.  I reviewed her course with her and her son who was on the phone; we discussed the current treatment regimen.  I also broached the subject of hospitalist given her overall disease.    Continuous Infusions:  Scheduled Meds:   electrolytes-dextrose  600 mL Oral Q4H    enoxparin  1 mg/kg Subcutaneous Q12H (treatment, non-standard time)    mupirocin   Nasal BID    sertraline  25 mg Oral Daily     PRN Meds:  Current  Facility-Administered Medications:     albuterol sulfate, 2.5 mg, Nebulization, Q4H PRN    ALPRAZolam, 0.25 mg, Oral, TID PRN    alteplase, 2 mg, Intra-Catheter, PRN    dextrose 10%, 12.5 g, Intravenous, PRN    dextrose 10%, 25 g, Intravenous, PRN    fluticasone propionate, 1 spray, Each Nostril, Q12H PRN    glucagon (human recombinant), 1 mg, Intramuscular, PRN    glucose, 16 g, Oral, PRN    glucose, 24 g, Oral, PRN    hydrALAZINE, 10 mg, Intravenous, Q6H PRN    naloxone, 0.02 mg, Intravenous, PRN    ondansetron, 4 mg, Intravenous, Q6H PRN    oxyCODONE, 5 mg, Oral, Q6H PRN    sodium chloride 0.9%, 10 mL, Intravenous, Q12H PRN      Vital Signs Range (Last 24H):  Temp:  [97.5 °F (36.4 °C)-98.6 °F (37 °C)]   Pulse:  [76-89]   Resp:  [18]   BP: (103-113)/(55-65)   SpO2:  [91 %-100 %]     Physical Exam  Constitutional:       General: She is not in acute distress.     Appearance: She is not ill-appearing, toxic-appearing or diaphoretic.   HENT:      Head: Normocephalic and atraumatic.   Cardiovascular:      Rate and Rhythm: Normal rate.   Pulmonary:      Comments: NC oxygen  Musculoskeletal:      Right lower leg: Edema present.      Left lower leg: Edema present.   Neurological:      General: No focal deficit present.      Mental Status: She is alert and oriented to person, place, and time.   Psychiatric:         Mood and Affect: Mood normal.         Behavior: Behavior normal.         Thought Content: Thought content normal.       Laboratory   CBC with Differential:  Recent Labs   Lab 10/24/24  0951   WBC 4.39   LYMPH 14.1*  0.6*   BASOPHIL 1.4   RBC 4.32   HCT 38.0   HGB 12.0   MCV 88   MCH 27.8   RDW 19.5*      MPV 9.9     CMP:  Recent Labs   Lab 10/24/24  0951   GLU 82   CALCIUM 9.0   ALBUMIN 2.7*   PROT 6.0      K 4.4   CO2 22*      BUN 74*   CREATININE 1.9*   ALKPHOS 1,072*   *   *   BILITOT 1.7*     BMP:   Recent Labs   Lab 10/24/24  0951   GLU 82   CALCIUM 9.0      K 4.4  "  CO2 22*      BUN 74*   CREATININE 1.9*     LFTs:   Recent Labs   Lab 10/24/24  0951   *   *   ALKPHOS 1,072*   BILITOT 1.7*   PROT 6.0   ALBUMIN 2.7*     Haptoglobin: No results for input(s): "HAPTOGLOBIN" in the last 24 hours.  Tumor Markers: No results for input(s): "PSA", "CEA", "", "AFPTM", "VD7897", "" in the last 24 hours.    Invalid input(s): "ALGTM"  Immunology: No results for input(s): "SPEP", "JULISSA", "LOYD", "FREELAMBDALI" in the last 24 hours.  Coagulation:   No results for input(s): "PT", "INR", "APTT" in the last 24 hours.      Microbiology Results (last 7 days)       ** No results found for the last 168 hours. **            Imaging     Imaging Results              US Abdomen Limited (Final result)  Result time 10/23/24 08:32:31      Final result by Chandrakant Fermin MD (10/23/24 08:32:31)                   Impression:      Severe heterogeneous echotexture throughout the liver with metastatic disease as above.      Electronically signed by: Chandrakant Fermin MD  Date:    10/23/2024  Time:    08:32               Narrative:    EXAMINATION:  US ABDOMEN LIMITED    CLINICAL HISTORY:  worsening elevation in LFTs, MIRELA/CKD3a, abdominal distention, metastatic ovarian cancer;    COMPARISON:  10/05/2024    FINDINGS:  Limited right upper quadrant ultrasound performed.  The liver measures 17.2 cm in length and again demonstrates severely heterogeneous echotexture throughout the liver with poorly defined mass within the left hepatic lobe measuring up to 8.8 cm.  Additional smaller hypoechoic lesions seen throughout concerning for metastatic disease.  Common bile duct is within normal limits at 5 mm..  No gallstones.  Mild wall thickening within the gallbladder which could reflect intrinsic liver disease.  Negative sonographic Back sign.  The visualized portions of the pancreas and IVC are within normal limits.  The right kidney is normal in length measuring 12 cm. Mild to moderate " right-sided hydronephrosis.  No definite renal mass seen.  Spleen is normal in size.                                       X-Ray Chest AP Portable (Final result)  Result time 10/22/24 17:30:24      Final result by Partha Miguel MD (10/22/24 17:30:24)                   Impression:      1.  Similar degree of vascular congestion versus reticular interstitial changes throughout the lungs with patchy ground-glass opacities.  Stable small to moderate left effusion.  New small right effusion.  Differential considerations would include chronic or recurrent CHF versus interstitial infectious process such as atypical viral pneumonia.  Clinical correlation is advised.    2.  Stable findings as noted above.      Electronically signed by: Partha Miguel MD  Date:    10/22/2024  Time:    17:30               Narrative:    EXAMINATION:  XR CHEST AP PORTABLE    CLINICAL HISTORY:  Shortness of breath    COMPARISON:  October 2, 2024    FINDINGS:  EKG leads overlie the chest.  Stable small to moderate left pleural effusion.  New small right pleural effusion.  Similar degree of vascular congestion versus reticular interstitial changes throughout the lungs.  Patchy ground-glass opacities noted as well.  The lungs are free of new pulmonary opacities.  The cardiac silhouette size is on the upper limits of normal.  Stable left-sided chest port.  Tortuous aorta with calcifications of the aortic knob.  There are degenerative changes of the spine and both shoulder girdles.                                      Pathology     Specimen (24h ago, onward)      None            Assessment and Plan:   Recurrent, Stage IV Serous Ovarian Cancer with Peritoneal Carcinomatosis  Tumor NGS/Blood NGS/FRa testing negative  Start Doxorubicn/Avstin 09/06/2024 however C2 delayed 2/2 to hospital admission for PNA 10/02/2024  NM PET-CT 10/11/2024 showed POD which is expected given break in therapy  Will plan for repeat imaging after C3 of Doxorubicin  Screen for  clinical trials sent and coordinate with gynecologic oncologist; the treatment options are paclitaxel, topotecan, gemcitabine and none of these are superior to the other; the median response rate is poor  (10-35%) with a high risk of rapid progression after the initiation of treatment which I believe we are seeing.  I broached the hospice discussion briefly however will see how she does during this hospitalization as she noted some clinical improvement after resuming chemo     IMRELA on CKD  Likely due to hypovolemia due to poor PO intake and metastatic disease as noted on prior imaging to have moderate right-sided hydronephrosis and hydroureter with ureter likely being obstructed by peritoneal implants.   Patient evaluated by urology today and offered drainage/stenting however declined and wants to see if renal function improves with hydration  I will discuss this with the patient as well as intervention is likely the better option given her GOC      Elevated Liver Enzymes  Likely due to hypovolemia; if no improvement may be due to hyperprogression of disease but less likely given recent resumption of chemotherapy     LLE  Venous Ultrasound showed no DVT      Hypotension due to hypovolemia   Hold antihypertensives  Management per primary team          Chronic Medical Conditions  Osteoarthritis  HTN  Anxiety      I appreciate the opportunity to participate in this patient's care. Please do not hesitate to contact me with any questions or concerns.     Torri Pugh MD  Hematology/Oncology

## 2024-10-24 NOTE — HPI
68F with multiple med issues including stage IV ovarian cancern with extensive liver metastatic disease. Hepatology was consulted because liver tests are worsening. She denies any significant abdominal pain.    No evidence of liver decompensation: no ascites, confusion or GI bleeding.

## 2024-10-24 NOTE — SUBJECTIVE & OBJECTIVE
Review of Systems   Constitutional:  Positive for activity change.   HENT: Negative.     Eyes: Negative.    Respiratory:  Positive for shortness of breath.    Cardiovascular:  Positive for leg swelling (improved).   Gastrointestinal:  Positive for abdominal distention.   Genitourinary: Negative.    Musculoskeletal: Negative.    Skin: Negative.    Neurological: Negative.    Psychiatric/Behavioral: Negative.         Past Medical History:   Diagnosis Date    Anemia     Anxiety     Arthritis     knees    Cancer     ovarian    CHF (congestive heart failure)     Hx antineoplastic chemotherapy     last 2019    Hyperlipemia     Hypertension     Neck pain     Ovarian cancer 2019    CHEMO    Peritoneal carcinomatosis 2019       Past Surgical History:   Procedure Laterality Date    breast reduction  10/02/2018    CATARACT EXTRACTION Bilateral     2023     SECTION      X 1    COLONOSCOPY N/A 2021    Procedure: COLONOSCOPY;  Surgeon: Paulette Rojas MD;  Location: [facility];  Service: Gastroenterology;  Laterality: N/A;    CYSTOSCOPY W/ URETERAL STENT PLACEMENT Right 2019    Procedure: CYSTOSCOPY, WITH URETERAL STENT INSERTION;  Surgeon: Timmy Santiago IV, MD;  Location: [facility];  Service: Urology;  Laterality: Right;    CYSTOSCOPY W/ URETERAL STENT REMOVAL  10/04/2019    DILATION AND CURETTAGE OF UTERUS      HYSTERECTOMY      RALH for fibroids (still has ovaries)    INSERTION OF VENOUS ACCESS PORT Left 2019    Procedure: INSERTION, VENOUS ACCESS PORT;  Surgeon: Ulisses Monzon MD;  Location: [facility];  Service: General;  Laterality: Left;  Left internal jugular     LYSIS OF ADHESIONS OF URETER N/A 08/15/2019    Procedure: URETEROLYSIS;  Surgeon: Ismael Juarez MD;  Location: [facility] OR;  Service: OB/GYN;  Laterality: N/A;    OMENTECTOMY N/A 08/15/2019    Procedure: OMENTECTOMY;  Surgeon: Ismael Juarez MD;  Location: [facility] OR;  Service: OB/GYN;  Laterality: N/A;    OOPHORECTOMY       RETROGRADE PYELOGRAPHY Right 2019    Procedure: PYELOGRAM, RETROGRADE;  Surgeon: Timmy Santiago IV, MD;  Location: [facility] OR;  Service: Urology;  Laterality: Right;    ROBOT-ASSISTED LAPAROSCOPIC SALPINGO-OOPHORECTOMY USING DA TIA XI Bilateral 08/15/2019    Procedure: XI ROBOTIC SALPINGO-OOPHORECTOMY;  Surgeon: Ismael Juarez MD;  Location: [facility] OR;  Service: OB/GYN;  Laterality: Bilateral;    TOTAL REDUCTION MAMMOPLASTY  2018    TUBAL LIGATION         Family history of liver disease: n/a    Review of patient's allergies indicates:  No Known Allergies      Tobacco Use    Smoking status: Former     Current packs/day: 0.00     Average packs/day: 1 pack/day for 25.0 years (25.0 ttl pk-yrs)     Types: Cigarettes     Start date: 10/1/1993     Quit date: 10/1/2018     Years since quittin.0    Smokeless tobacco: Never    Tobacco comments:     States started quit 2 months ago after 30 years   Substance and Sexual Activity    Alcohol use: Yes     Comment: occasionally  No alcohol 72h prior to sx    Drug use: No    Sexual activity: Not Currently     Partners: Male     Comment: hyst; mut monog       Facility-Administered Medications Prior to Admission   Medication Dose Route Frequency Provider Last Rate Last Admin    alteplase injection 2 mg  2 mg Intra-Catheter PRN          Medications Prior to Admission   Medication Sig Dispense Refill Last Dose/Taking    amLODIPine (NORVASC) 5 MG tablet Take 2 tablets (10 mg total) by mouth once daily. 180 tablet 3 10/21/2024    atenoloL (TENORMIN) 25 MG tablet Take 25 mg by mouth once daily.   10/21/2024    biotin 1 mg tablet Take 1,000 mcg by mouth once daily.    10/21/2024    diclofenac sodium (VOLTAREN) 1 % Gel Apply once a day to back as needed 100 g 1 10/21/2024    gabapentin (NEURONTIN) 300 MG capsule Take 2 capsules (600 mg total) by mouth 3 (three) times daily. 180 capsule 3 10/21/2024    ginkgo biloba 40 mg Tab Take 40 mg by mouth once daily.   10/21/2024     multivitamin with minerals tablet Take 1 tablet by mouth once daily.    10/21/2024    olmesartan-hydrochlorothiazide (BENICAR HCT) 40-25 mg per tablet Take 1 tablet by mouth once daily. 90 tablet 1 10/21/2024    ondansetron (ZOFRAN-ODT) 8 MG TbDL Take 1 tablet (8 mg total) by mouth every 8 (eight) hours. 90 tablet 5 10/21/2024    rosuvastatin (CRESTOR) 10 MG tablet Take 1 tablet (10 mg total) by mouth once daily. 90 tablet 3 10/21/2024    sertraline (ZOLOFT) 25 MG tablet Take 1 tablet (25 mg total) by mouth once daily. 90 tablet 0 10/21/2024    albuterol (PROVENTIL/VENTOLIN HFA) 90 mcg/actuation inhaler Inhale 2 puffs into the lungs every 4 (four) hours as needed for Wheezing. Rescue 18 g 1 More than a month    ALPRAZolam (XANAX) 0.25 MG tablet Take 1 tablet (0.25 mg total) by mouth 3 (three) times daily as needed for Anxiety. 60 tablet 2 More than a month    cetirizine (ZYRTEC) 10 MG tablet Take 1 tablet (10 mg total) by mouth once daily. (Patient taking differently: Take 10 mg by mouth as needed for Allergies.) 30 tablet 5 Unknown    EPINEPHrine (EPIPEN) 0.3 mg/0.3 mL AtIn Inject 0.3 mLs (0.3 mg total) into the muscle once. for 1 dose 2 each 0     fluticasone propionate (FLONASE) 50 mcg/actuation nasal spray 1 spray (50 mcg total) by Each Nostril route every 12 (twelve) hours as needed for Rhinitis. 16 g 0 More than a month    prochlorperazine (COMPAZINE) 5 MG tablet Take 1 tablet (5 mg total) by mouth every 6 (six) hours as needed for Nausea. 20 tablet 11 Unknown    traMADoL (ULTRAM) 50 mg tablet Take 1 tablet (50 mg total) by mouth every 12 (twelve) hours as needed for Pain. 30 tablet 0 More than a month       Objective:     Vital Signs (Most Recent):  Temp: 98 °F (36.7 °C) (10/24/24 1218)  Pulse: 88 (10/24/24 1218)  Resp: 18 (10/24/24 1218)  BP: (!) 113/57 (10/24/24 1218)  SpO2: 100 % (10/24/24 1218) Vital Signs (24h Range):  Temp:  [97.5 °F (36.4 °C)-98.6 °F (37 °C)] 98 °F (36.7 °C)  Pulse:  [76-89] 88  Resp:   [18] 18  SpO2:  [91 %-100 %] 100 %  BP: (103-113)/(55-65) 113/57     Weight: 104.6 kg (230 lb 9.6 oz) (10/23/24 0600)  Body mass index is 36.12 kg/m².       Physical Exam  Vitals reviewed.   Constitutional:       General: She is not in acute distress.     Appearance: She is well-developed.   HENT:      Head: Normocephalic and atraumatic.      Mouth/Throat:      Pharynx: No oropharyngeal exudate.   Eyes:      General: No scleral icterus.        Right eye: No discharge.         Left eye: No discharge.      Conjunctiva/sclera: Conjunctivae normal.      Pupils: Pupils are equal, round, and reactive to light.   Pulmonary:      Effort: Pulmonary effort is normal. No respiratory distress.      Breath sounds: No wheezing.   Abdominal:      General: There is no distension.      Tenderness: There is no abdominal tenderness.   Neurological:      Mental Status: She is alert and oriented to person, place, and time.   Psychiatric:         Behavior: Behavior normal.            MELD 3.0: 20 at 10/24/2024  9:51 AM  MELD-Na: 18 at 10/24/2024  9:51 AM  Calculated from:  Serum Creatinine: 1.9 mg/dL at 10/24/2024  9:51 AM  Serum Sodium: 143 mmol/L (Using max of 137 mmol/L) at 10/24/2024  9:51 AM  Total Bilirubin: 1.7 mg/dL at 10/24/2024  9:51 AM  Serum Albumin: 2.7 g/dL at 10/24/2024  9:51 AM  INR(ratio): 1.3 at 10/23/2024  4:11 AM  Age at listing (hypothetical): 68 years  Sex: Female at 10/24/2024  9:51 AM      Significant Labs:  Labs within the past month have been reviewed.    Significant Imaging:  US: Reviewed  CT: Reviewed

## 2024-10-24 NOTE — ASSESSMENT & PLAN NOTE
Worsening likely related to extensive metastatic ovarian cancer in the liver. Imaging reviewed and discussed with Dr. Fermin who notes extensive metastatic disease as seen on imaging without an intervenable process. Overall prognosis is poor as it seems patient is dying from advanced cancer.  -Case discussed with hospital med, IR and heme/onc  -No specific hepatology recommendations, oncology to consider hospice

## 2024-10-24 NOTE — PLAN OF CARE
Patient is stable. No signs or symptoms of acute distress. Meds given as ordered. Continuous pulse ox in place and continuous cardiac monitoring running NSR. Bed in lowest position, call light in reach. Chart orders reviewed.

## 2024-10-24 NOTE — PLAN OF CARE
Problem: Skin Injury Risk Increased  Goal: Skin Health and Integrity  Outcome: Progressing     Problem: Adult Inpatient Plan of Care  Goal: Plan of Care Review  Outcome: Progressing  Goal: Patient-Specific Goal (Individualized)  Outcome: Progressing  Flowsheets (Taken 10/24/2024 7879)  Individualized Care Needs: none  Anxieties, Fears or Concerns: apetite  Patient/Family-Specific Goals (Include Timeframe): none  Goal: Absence of Hospital-Acquired Illness or Injury  Outcome: Progressing  Goal: Optimal Comfort and Wellbeing  Outcome: Progressing  Goal: Readiness for Transition of Care  Outcome: Progressing     Problem: Infection  Goal: Absence of Infection Signs and Symptoms  Outcome: Progressing     Problem: Acute Kidney Injury/Impairment  Goal: Fluid and Electrolyte Balance  Outcome: Progressing  Goal: Improved Oral Intake  Outcome: Progressing  Goal: Effective Renal Function  Outcome: Progressing     Problem: Pneumonia  Goal: Fluid Balance  Outcome: Progressing  Goal: Resolution of Infection Signs and Symptoms  Outcome: Progressing  Goal: Effective Oxygenation and Ventilation  Outcome: Progressing

## 2024-10-25 PROBLEM — E44.0 MODERATE MALNUTRITION: Status: ACTIVE | Noted: 2024-10-25

## 2024-10-25 LAB
ALBUMIN SERPL BCP-MCNC: 2.5 G/DL (ref 3.5–5.2)
ALP SERPL-CCNC: 1117 U/L (ref 40–150)
ALT SERPL W/O P-5'-P-CCNC: 273 U/L (ref 10–44)
ANION GAP SERPL CALC-SCNC: 11 MMOL/L (ref 8–16)
ANISOCYTOSIS BLD QL SMEAR: SLIGHT
AST SERPL-CCNC: 787 U/L (ref 10–40)
BASOPHILS # BLD AUTO: 0.05 K/UL (ref 0–0.2)
BASOPHILS NFR BLD: 1.1 % (ref 0–1.9)
BILIRUB SERPL-MCNC: 1.9 MG/DL (ref 0.1–1)
BUN SERPL-MCNC: 69 MG/DL (ref 8–23)
CALCIUM SERPL-MCNC: 9 MG/DL (ref 8.7–10.5)
CHLORIDE SERPL-SCNC: 110 MMOL/L (ref 95–110)
CO2 SERPL-SCNC: 21 MMOL/L (ref 23–29)
CREAT SERPL-MCNC: 1.7 MG/DL (ref 0.5–1.4)
DIFFERENTIAL METHOD BLD: ABNORMAL
EOSINOPHIL # BLD AUTO: 0.1 K/UL (ref 0–0.5)
EOSINOPHIL NFR BLD: 1.4 % (ref 0–8)
ERYTHROCYTE [DISTWIDTH] IN BLOOD BY AUTOMATED COUNT: 19.9 % (ref 11.5–14.5)
EST. GFR  (NO RACE VARIABLE): 32 ML/MIN/1.73 M^2
GLUCOSE SERPL-MCNC: 89 MG/DL (ref 70–110)
HCT VFR BLD AUTO: 39.5 % (ref 37–48.5)
HGB BLD-MCNC: 11.9 G/DL (ref 12–16)
HYPOCHROMIA BLD QL SMEAR: ABNORMAL
IMM GRANULOCYTES # BLD AUTO: 0.02 K/UL (ref 0–0.04)
IMM GRANULOCYTES NFR BLD AUTO: 0.5 % (ref 0–0.5)
LYMPHOCYTES # BLD AUTO: 0.7 K/UL (ref 1–4.8)
LYMPHOCYTES NFR BLD: 15.6 % (ref 18–48)
MAGNESIUM SERPL-MCNC: 2.5 MG/DL (ref 1.6–2.6)
MCH RBC QN AUTO: 27 PG (ref 27–31)
MCHC RBC AUTO-ENTMCNC: 30.1 G/DL (ref 32–36)
MCV RBC AUTO: 90 FL (ref 82–98)
MONOCYTES # BLD AUTO: 0.2 K/UL (ref 0.3–1)
MONOCYTES NFR BLD: 3.7 % (ref 4–15)
NEUTROPHILS # BLD AUTO: 3.4 K/UL (ref 1.8–7.7)
NEUTROPHILS NFR BLD: 77.7 % (ref 38–73)
NRBC BLD-RTO: 0 /100 WBC
OVALOCYTES BLD QL SMEAR: ABNORMAL
PHOSPHATE SERPL-MCNC: 3.3 MG/DL (ref 2.7–4.5)
PLATELET # BLD AUTO: 227 K/UL (ref 150–450)
PLATELET BLD QL SMEAR: ABNORMAL
PMV BLD AUTO: 11.1 FL (ref 9.2–12.9)
POIKILOCYTOSIS BLD QL SMEAR: SLIGHT
POTASSIUM SERPL-SCNC: 4.2 MMOL/L (ref 3.5–5.1)
PROT SERPL-MCNC: 6.1 G/DL (ref 6–8.4)
RBC # BLD AUTO: 4.4 M/UL (ref 4–5.4)
SODIUM SERPL-SCNC: 142 MMOL/L (ref 136–145)
WBC # BLD AUTO: 4.37 K/UL (ref 3.9–12.7)

## 2024-10-25 PROCEDURE — 36415 COLL VENOUS BLD VENIPUNCTURE: CPT | Mod: HCNC | Performed by: STUDENT IN AN ORGANIZED HEALTH CARE EDUCATION/TRAINING PROGRAM

## 2024-10-25 PROCEDURE — 63600175 PHARM REV CODE 636 W HCPCS: Mod: HCNC | Performed by: STUDENT IN AN ORGANIZED HEALTH CARE EDUCATION/TRAINING PROGRAM

## 2024-10-25 PROCEDURE — 83735 ASSAY OF MAGNESIUM: CPT | Mod: HCNC | Performed by: STUDENT IN AN ORGANIZED HEALTH CARE EDUCATION/TRAINING PROGRAM

## 2024-10-25 PROCEDURE — 99900035 HC TECH TIME PER 15 MIN (STAT): Mod: HCNC

## 2024-10-25 PROCEDURE — 25000003 PHARM REV CODE 250: Mod: HCNC | Performed by: INTERNAL MEDICINE

## 2024-10-25 PROCEDURE — 63600175 PHARM REV CODE 636 W HCPCS: Mod: HCNC | Performed by: INTERNAL MEDICINE

## 2024-10-25 PROCEDURE — 84100 ASSAY OF PHOSPHORUS: CPT | Mod: HCNC | Performed by: STUDENT IN AN ORGANIZED HEALTH CARE EDUCATION/TRAINING PROGRAM

## 2024-10-25 PROCEDURE — 94799 UNLISTED PULMONARY SVC/PX: CPT | Mod: HCNC

## 2024-10-25 PROCEDURE — 27000221 HC OXYGEN, UP TO 24 HOURS: Mod: HCNC

## 2024-10-25 PROCEDURE — 25000003 PHARM REV CODE 250: Mod: HCNC | Performed by: STUDENT IN AN ORGANIZED HEALTH CARE EDUCATION/TRAINING PROGRAM

## 2024-10-25 PROCEDURE — 94761 N-INVAS EAR/PLS OXIMETRY MLT: CPT | Mod: HCNC

## 2024-10-25 PROCEDURE — 21400001 HC TELEMETRY ROOM: Mod: HCNC

## 2024-10-25 PROCEDURE — 80053 COMPREHEN METABOLIC PANEL: CPT | Mod: HCNC | Performed by: STUDENT IN AN ORGANIZED HEALTH CARE EDUCATION/TRAINING PROGRAM

## 2024-10-25 PROCEDURE — 85025 COMPLETE CBC W/AUTO DIFF WBC: CPT | Mod: HCNC | Performed by: STUDENT IN AN ORGANIZED HEALTH CARE EDUCATION/TRAINING PROGRAM

## 2024-10-25 RX ORDER — PROMETHAZINE HYDROCHLORIDE 12.5 MG/1
12.5 TABLET ORAL EVERY 6 HOURS PRN
Status: DISCONTINUED | OUTPATIENT
Start: 2024-10-25 | End: 2024-10-25

## 2024-10-25 RX ORDER — PROCHLORPERAZINE EDISYLATE 5 MG/ML
2.5 INJECTION INTRAMUSCULAR; INTRAVENOUS EVERY 6 HOURS PRN
Status: DISCONTINUED | OUTPATIENT
Start: 2024-10-25 | End: 2024-10-30 | Stop reason: HOSPADM

## 2024-10-25 RX ADMIN — ENOXAPARIN SODIUM 105 MG: 120 INJECTION SUBCUTANEOUS at 01:10

## 2024-10-25 RX ADMIN — MUPIROCIN: 20 OINTMENT TOPICAL at 09:10

## 2024-10-25 RX ADMIN — ONDANSETRON 4 MG: 2 INJECTION INTRAMUSCULAR; INTRAVENOUS at 10:10

## 2024-10-25 RX ADMIN — PROMETHAZINE HYDROCHLORIDE 12.5 MG: 12.5 TABLET ORAL at 02:10

## 2024-10-25 RX ADMIN — Medication 600 ML: at 12:10

## 2024-10-25 RX ADMIN — SERTRALINE HYDROCHLORIDE 25 MG: 25 TABLET ORAL at 08:10

## 2024-10-25 RX ADMIN — MUPIROCIN: 20 OINTMENT TOPICAL at 08:10

## 2024-10-25 RX ADMIN — Medication 600 ML: at 05:10

## 2024-10-25 RX ADMIN — Medication 600 ML: at 08:10

## 2024-10-25 RX ADMIN — ENOXAPARIN SODIUM 105 MG: 120 INJECTION SUBCUTANEOUS at 12:10

## 2024-10-25 NOTE — ASSESSMENT & PLAN NOTE
Recent Labs     10/22/24  1829 10/23/24  0411 10/24/24  0951   * 717* 772*   * 237* 266*         10/24/24 Discussion with hepatology, Interventional Radiology, Heme-Onc regarding worsened liver function.  Concerns for progression of metastatic disease, goals of care discussions ongoing.

## 2024-10-25 NOTE — ASSESSMENT & PLAN NOTE
Patient has Diastolic (HFpEF) heart failure that is Chronic. On presentation their CHF was well compensated. Most recent BNP and echo results are listed below.  Recent Labs     10/22/24  1829   BNP 27       Latest ECHO  Results for orders placed during the hospital encounter of 09/03/24    Echo    Interpretation Summary    Left Ventricle: The left ventricle is normal in size. Normal wall thickness. There is normal systolic function with a visually estimated ejection fraction of 60 - 65%. Biplane (2D) method of discs ejection fraction is 56%. Global longitudinal strain is -19.5%. There is normal diastolic function.    Right Ventricle: Normal right ventricular cavity size. Wall thickness is normal. Systolic function is normal.    IVC/SVC: Normal venous pressure at 3 mmHg.    Malignant neoplasm of both ovaries    Baseline echo 9/3/2024  NORM -19.47%  Biplane 56%    Current Heart Failure Medications  hydrALAZINE injection 10 mg, Every 6 hours PRN, Intravenous    Plan  - Monitor strict I&Os and daily weights.    - Place on telemetry  - Cardiology has not been consulted  - The patient's volume status is at their baseline

## 2024-10-25 NOTE — ASSESSMENT & PLAN NOTE
Malnutrition Type:  Context: acute on chronic illness  Level: severe    Related to (etiology):   Physiological causes increasing nutrient needs d/t illness  Alteration in GI tract structure/function    Signs and Symptoms (as evidenced by):   Unintentional weight loss, adults, of >7.5% in 3 months  Inc loss of subcutaneous fat  Inc muscle loss  Inc localized and generalized fluid accumulation  Unable or unwilling to eat sufficient protein/energy to maintain a healthy weight  Food avoidance and/or lack of interest in food    Malnutrition Characteristic Summary:  Weight Loss (Malnutrition): greater than 7.5% in 3 months  Energy Intake (Malnutrition): less than or equal to 50% for greater than or equal to 5 days  Subcutaneous Fat (Malnutrition): mild depletion  Muscle Mass (Malnutrition): moderate depletion  Fluid Accumulation (Malnutrition): moderate    Interventions (treatment strategy):  1. Manage composition of oral intake, texture modified diet    2. Management of nutrition related prescription medication   3.Feeding assistance management    4 Collaboration by nutrition professional with other providers    Nutrition Diagnosis Status:   New

## 2024-10-25 NOTE — SUBJECTIVE & OBJECTIVE
Interval History: No acute events overnight, afebrile, hemodynamically stable.  Patient still reports poor appetite, but has been trying to drink increased fluids, with some improvement in renal function.  Still reports lower extremity swelling. Urology evaluation of hydronephrosis noted kidney obstruction likely secondary from progression of cancer and after discussion with the patient, patient opted for conservative management at the time.  Discussion with hepatology, Interventional Radiology, Heme-Onc regarding worsened liver function.  Concerns for progression of metastatic disease, goals of care discussions ongoing.       Objective:     Vital Signs (Most Recent):  Temp: 98 °F (36.7 °C) (10/24/24 1937)  Pulse: 91 (10/24/24 2044)  Resp: 18 (10/24/24 2044)  BP: (!) 95/51 (10/24/24 1937)  SpO2: (!) 92 % (10/24/24 2044) Vital Signs (24h Range):  Temp:  [97.7 °F (36.5 °C)-98.6 °F (37 °C)] 98 °F (36.7 °C)  Pulse:  [81-93] 91  Resp:  [18] 18  SpO2:  [90 %-100 %] 92 %  BP: ()/(51-65) 95/51     Weight: 104.6 kg (230 lb 9.6 oz)  Body mass index is 36.12 kg/m².    Intake/Output Summary (Last 24 hours) at 10/24/2024 2244  Last data filed at 10/24/2024 1757  Gross per 24 hour   Intake 960 ml   Output --   Net 960 ml         Physical Exam  Vitals and nursing note reviewed.   Constitutional:       General: She is not in acute distress.     Appearance: She is obese. She is not ill-appearing, toxic-appearing or diaphoretic.   HENT:      Head: Normocephalic and atraumatic.      Mouth/Throat:      Mouth: Mucous membranes are moist.   Eyes:      General: No scleral icterus.        Right eye: No discharge.         Left eye: No discharge.   Cardiovascular:      Rate and Rhythm: Normal rate and regular rhythm.      Heart sounds: Normal heart sounds.   Pulmonary:      Effort: Pulmonary effort is normal. No respiratory distress.      Breath sounds: Normal breath sounds.   Abdominal:      General: Bowel sounds are normal.       Palpations: Abdomen is soft.   Musculoskeletal:         General: Swelling present.      Cervical back: No rigidity.      Right lower leg: Edema present.      Left lower leg: Edema present.   Skin:     General: Skin is warm and dry.      Coloration: Skin is not jaundiced.   Neurological:      Mental Status: She is alert and oriented to person, place, and time. Mental status is at baseline.   Psychiatric:         Mood and Affect: Mood normal.         Behavior: Behavior normal.             Significant Labs: All pertinent labs within the past 24 hours have been reviewed.  LABS:  Recent Labs   Lab 10/22/24  1829 10/23/24  0411 10/24/24  0951    140 143   K 4.4 4.6 4.4    109 110   CO2 19* 20* 22*   BUN 80* 82* 74*   CREATININE 2.4* 2.3* 1.9*   GLU 79 77 82   ANIONGAP 10 11 11     Recent Labs   Lab 10/22/24  1015 10/22/24  1829 10/24/24  0951   MG 2.5 2.4 2.6   PHOS 4.5  --  3.3     Recent Labs   Lab 10/22/24  1829 10/23/24  0411 10/24/24  0951   * 717* 772*   * 237* 266*   ALKPHOS 1,018* 937* 1,072*   BILITOT 1.2* 1.3* 1.7*   ALBUMIN 2.3* 2.7* 2.7*     Recent Labs   Lab 10/22/24  1829 10/23/24  0411 10/24/24  0951   WBC 4.10 3.56* 4.39   HGB 10.6* 10.6* 12.0   HCT 33.8* 33.8* 38.0    220 239   GRAN 75.9*  3.1 67.4  2.4 77.9*  3.4             Significant Imaging: I have reviewed all pertinent imaging results/findings within the past 24 hours.  US Lower Extremity Veins Bilateral   Final Result      No evidence of deep venous thrombosis in either lower extremity.         Electronically signed by: Lauren Barron   Date:    10/23/2024   Time:    20:57      US Abdomen Limited   Final Result      Severe heterogeneous echotexture throughout the liver with metastatic disease as above.         Electronically signed by: Chandrakant Fermin MD   Date:    10/23/2024   Time:    08:32      X-Ray Chest AP Portable   Final Result      1.  Similar degree of vascular congestion versus reticular  interstitial changes throughout the lungs with patchy ground-glass opacities.  Stable small to moderate left effusion.  New small right effusion.  Differential considerations would include chronic or recurrent CHF versus interstitial infectious process such as atypical viral pneumonia.  Clinical correlation is advised.      2.  Stable findings as noted above.         Electronically signed by: Partha Miguel MD   Date:    10/22/2024   Time:    17:30          Inpatient Medications:  Continuous Infusions:  Scheduled Meds:   electrolytes-dextrose  600 mL Oral Q4H    enoxparin  1 mg/kg Subcutaneous Q12H (treatment, non-standard time)    mupirocin   Nasal BID    sertraline  25 mg Oral Daily     PRN Meds:  Current Facility-Administered Medications:     albuterol sulfate, 2.5 mg, Nebulization, Q4H PRN    ALPRAZolam, 0.25 mg, Oral, TID PRN    alteplase, 2 mg, Intra-Catheter, PRN    dextrose 10%, 12.5 g, Intravenous, PRN    dextrose 10%, 25 g, Intravenous, PRN    fluticasone propionate, 1 spray, Each Nostril, Q12H PRN    glucagon (human recombinant), 1 mg, Intramuscular, PRN    glucose, 16 g, Oral, PRN    glucose, 24 g, Oral, PRN    hydrALAZINE, 10 mg, Intravenous, Q6H PRN    naloxone, 0.02 mg, Intravenous, PRN    ondansetron, 4 mg, Intravenous, Q6H PRN    oxyCODONE, 5 mg, Oral, Q6H PRN    sodium chloride 0.9%, 10 mL, Intravenous, Q12H PRN

## 2024-10-25 NOTE — ASSESSMENT & PLAN NOTE
MIRELA is likely due to pre-renal azotemia due to dehydration related to nausea with vomiting, hypotension, inadequate oral intake, and likely also related to progressive metastatic disease. Baseline creatinine is  1.1-1.3 . Most recent creatinine and eGFR are listed below.  Recent Labs     10/22/24  1829 10/23/24  0411 10/24/24  0951   CREATININE 2.4* 2.3* 1.9*   EGFRNORACEVR 21* 22* 28*        Plan  - MIRELA is improving--> patient received albumin 25% 25 g IV x1 dose in the emergency department. Patient is in need of IV fluids for the treatment of MIRELA.  Due to a shortage of IV fluids, patient to receive oral fluids.  Patient must receive this treatment in a hospital location due to the risk of adverse effects, insufficient response to treatment, or other potential complications of disease. We will encourage increased fluid intake, Pedialyte 600 mL p.o. q.4 hours ordered  - Avoid nephrotoxins and renally dose meds for GFR listed above  - Monitor urine output, serial BMP, and adjust therapy as needed  - Urology evaluation of hydronephrosis on imaging noted kidney obstruction likely secondary from progression of cancer and after discussion with the patient, patient opted for conservative management at the time.

## 2024-10-25 NOTE — PLAN OF CARE
Discussed poc with pt, pt verbalized understanding  Purposeful rounding every 2hours  VS wnl  Cardiac monitoring in use, pt is NSR, tele monitor #[id number]  Fall precautions in place, remains injury free  Pain and nausea under control with PRN meds  IVFs: Pedialyte  Accurate I&Os  Abx given as prescribed  Bed locked at lowest position  Call light within reach  Chart check complete  Will cont with POC    Problem: Skin Injury Risk Increased  Goal: Skin Health and Integrity  Outcome: Progressing     Problem: Adult Inpatient Plan of Care  Goal: Plan of Care Review  Outcome: Progressing  Goal: Patient-Specific Goal (Individualized)  Outcome: Progressing  Goal: Absence of Hospital-Acquired Illness or Injury  Outcome: Progressing  Goal: Optimal Comfort and Wellbeing  Outcome: Progressing  Goal: Readiness for Transition of Care  Outcome: Progressing     Problem: Infection  Goal: Absence of Infection Signs and Symptoms  Outcome: Progressing     Problem: Acute Kidney Injury/Impairment  Goal: Fluid and Electrolyte Balance  Outcome: Progressing  Goal: Improved Oral Intake  Outcome: Progressing  Goal: Effective Renal Function  Outcome: Progressing     Problem: Pneumonia  Goal: Fluid Balance  Outcome: Progressing  Goal: Resolution of Infection Signs and Symptoms  Outcome: Progressing  Goal: Effective Oxygenation and Ventilation  Outcome: Progressing

## 2024-10-25 NOTE — PROGRESS NOTES
Hematology/Oncology  Progress Note   Admission Date: 10/22/2024  Hospital Length of Stay: 3  Code Status: FULL CODE  Attending Provider:  Anival Hernandez DO  Consulting Provider: Torri Pugh MD  Primary Care Physician: Rossana Ang MD   Principal Problem: Acute renal failure superimposed on stage 3a chronic kidney disease    Reason for Consult: Disease Progression   Subjective    The patient reports NAEON. She feels better and is increasing her PO intake.  She has no acute complaints this morning.   We discussed the gravity of her disease which she is fully aware of and understands that aggressive nature and limited treatment options.     Review of Systems   Constitutional:  Negative for chills, fever and malaise/fatigue.   Respiratory:  Negative for shortness of breath.    Cardiovascular:  Positive for leg swelling. Negative for chest pain.   Gastrointestinal:  Negative for abdominal pain, constipation, diarrhea, nausea and vomiting.   Musculoskeletal:  Negative for myalgias.   Neurological:  Negative for headaches.   Psychiatric/Behavioral:  The patient is not nervous/anxious.      Objective   Previous HPI  Casie Gaines is a 68 y.o. 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  Her treatment was interrupted due to hospitalization for PNA.      On evaluation in clinic yesterday the patient was noted to have acute liver injury as well as MIRELA and was directed to go to the ED for hospital admission.  She was severely dehydrated due to poor p.o. intake.  Today she does appear clinically improved however lab indices still abnormal.  I reviewed her course with her and her son who was on the phone; we discussed the current treatment regimen.  I also broached the subject of hospitalist given her overall disease.    Continuous Infusions:  Scheduled Meds:   electrolytes-dextrose  600 mL Oral Q4H    enoxparin  1  mg/kg Subcutaneous Q12H (treatment, non-standard time)    mupirocin   Nasal BID    sertraline  25 mg Oral Daily     PRN Meds:  Current Facility-Administered Medications:     albuterol sulfate, 2.5 mg, Nebulization, Q4H PRN    ALPRAZolam, 0.25 mg, Oral, TID PRN    alteplase, 2 mg, Intra-Catheter, PRN    dextrose 10%, 12.5 g, Intravenous, PRN    dextrose 10%, 25 g, Intravenous, PRN    fluticasone propionate, 1 spray, Each Nostril, Q12H PRN    glucagon (human recombinant), 1 mg, Intramuscular, PRN    glucose, 16 g, Oral, PRN    glucose, 24 g, Oral, PRN    hydrALAZINE, 10 mg, Intravenous, Q6H PRN    naloxone, 0.02 mg, Intravenous, PRN    ondansetron, 4 mg, Intravenous, Q6H PRN    oxyCODONE, 5 mg, Oral, Q6H PRN    sodium chloride 0.9%, 10 mL, Intravenous, Q12H PRN      Vital Signs Range (Last 24H):  Temp:  [97.7 °F (36.5 °C)-98.7 °F (37.1 °C)]   Pulse:  []   Resp:  [16-18]   BP: ()/(51-59)   SpO2:  [90 %-100 %]     Physical Exam  Constitutional:       General: She is not in acute distress.     Appearance: She is not ill-appearing, toxic-appearing or diaphoretic.   HENT:      Head: Normocephalic and atraumatic.   Cardiovascular:      Rate and Rhythm: Normal rate.   Pulmonary:      Comments: NC oxygen  Musculoskeletal:      Right lower leg: Edema present.      Left lower leg: Edema present.   Neurological:      General: No focal deficit present.      Mental Status: She is alert and oriented to person, place, and time.   Psychiatric:         Mood and Affect: Mood normal.         Behavior: Behavior normal.         Thought Content: Thought content normal.       Laboratory   CBC with Differential:  Recent Labs   Lab 10/24/24  0951   WBC 4.39   LYMPH 14.1*  0.6*   BASOPHIL 1.4   RBC 4.32   HCT 38.0   HGB 12.0   MCV 88   MCH 27.8   RDW 19.5*      MPV 9.9     CMP:  Recent Labs   Lab 10/24/24  0951   GLU 82   CALCIUM 9.0   ALBUMIN 2.7*   PROT 6.0      K 4.4   CO2 22*      BUN 74*   CREATININE 1.9*  "  ALKPHOS 1,072*   *   *   BILITOT 1.7*     BMP:   Recent Labs   Lab 10/24/24  0951   GLU 82   CALCIUM 9.0      K 4.4   CO2 22*      BUN 74*   CREATININE 1.9*     LFTs:   Recent Labs   Lab 10/24/24  0951   *   *   ALKPHOS 1,072*   BILITOT 1.7*   PROT 6.0   ALBUMIN 2.7*     Haptoglobin: No results for input(s): "HAPTOGLOBIN" in the last 24 hours.  Tumor Markers: No results for input(s): "PSA", "CEA", "", "AFPTM", "SG7672", "" in the last 24 hours.    Invalid input(s): "ALGTM"  Immunology: No results for input(s): "SPEP", "JULISSA", "LOYD", "FREELAMBDALI" in the last 24 hours.  Coagulation:   No results for input(s): "PT", "INR", "APTT" in the last 24 hours.      Microbiology Results (last 7 days)       ** No results found for the last 168 hours. **            Imaging     Imaging Results              US Abdomen Limited (Final result)  Result time 10/23/24 08:32:31      Final result by Chandrakant Fermin MD (10/23/24 08:32:31)                   Impression:      Severe heterogeneous echotexture throughout the liver with metastatic disease as above.      Electronically signed by: Chandrakant Fermin MD  Date:    10/23/2024  Time:    08:32               Narrative:    EXAMINATION:  US ABDOMEN LIMITED    CLINICAL HISTORY:  worsening elevation in LFTs, MIRELA/CKD3a, abdominal distention, metastatic ovarian cancer;    COMPARISON:  10/05/2024    FINDINGS:  Limited right upper quadrant ultrasound performed.  The liver measures 17.2 cm in length and again demonstrates severely heterogeneous echotexture throughout the liver with poorly defined mass within the left hepatic lobe measuring up to 8.8 cm.  Additional smaller hypoechoic lesions seen throughout concerning for metastatic disease.  Common bile duct is within normal limits at 5 mm..  No gallstones.  Mild wall thickening within the gallbladder which could reflect intrinsic liver disease.  Negative sonographic Back sign.  The visualized " portions of the pancreas and IVC are within normal limits.  The right kidney is normal in length measuring 12 cm. Mild to moderate right-sided hydronephrosis.  No definite renal mass seen.  Spleen is normal in size.                                       X-Ray Chest AP Portable (Final result)  Result time 10/22/24 17:30:24      Final result by Partha Miguel MD (10/22/24 17:30:24)                   Impression:      1.  Similar degree of vascular congestion versus reticular interstitial changes throughout the lungs with patchy ground-glass opacities.  Stable small to moderate left effusion.  New small right effusion.  Differential considerations would include chronic or recurrent CHF versus interstitial infectious process such as atypical viral pneumonia.  Clinical correlation is advised.    2.  Stable findings as noted above.      Electronically signed by: Partha Migeul MD  Date:    10/22/2024  Time:    17:30               Narrative:    EXAMINATION:  XR CHEST AP PORTABLE    CLINICAL HISTORY:  Shortness of breath    COMPARISON:  October 2, 2024    FINDINGS:  EKG leads overlie the chest.  Stable small to moderate left pleural effusion.  New small right pleural effusion.  Similar degree of vascular congestion versus reticular interstitial changes throughout the lungs.  Patchy ground-glass opacities noted as well.  The lungs are free of new pulmonary opacities.  The cardiac silhouette size is on the upper limits of normal.  Stable left-sided chest port.  Tortuous aorta with calcifications of the aortic knob.  There are degenerative changes of the spine and both shoulder girdles.                                      Pathology     Specimen (24h ago, onward)      None            Assessment and Plan:   Recurrent, Stage IV Serous Ovarian Cancer with Peritoneal Carcinomatosis  Tumor NGS/Blood NGS/FRa testing negative  Start Doxorubicn/Avstin 09/06/2024 however C2 delayed 2/2 to hospital admission for PNA 10/02/2024  NM  PET-CT 10/11/2024 showed POD which is expected given break in therapy  Will plan for repeat imaging after C3 of Doxorubicin  Screen for clinical trials sent and coordinate with gynecologic oncologist; the treatment options are paclitaxel, topotecan, gemcitabine and none of these are superior to the other; the median response rate is poor  (10-35%) with a high risk of rapid progression after the initiation of treatment which I believe we are seeing.  I broached the hospice discussion briefly however will see how she does during this hospitalization as she noted some clinical improvement after resuming chemo     MIRELA on CKD  Likely due to hypovolemia due to poor PO intake and metastatic disease as noted on prior imaging to have moderate right-sided hydronephrosis and hydroureter with ureter likely being obstructed by peritoneal implants.   Patient evaluated by urology today and offered drainage/stenting however declined and wants to see if renal function improves with hydration  Discussed with patient and we will continue to monitor with labs and supportive care      Elevated Liver Enzymes  Likely due to hypovolemia/metastatic disease; if no improvement may be due to hyperprogression of disease but less likely given recent resumption of chemotherapy     LLE  Venous Ultrasound showed no DVT      Hypotension due to hypovolemia   Hold antihypertensives  Management per primary team          Chronic Medical Conditions  Osteoarthritis  HTN  Anxiety      I appreciate the opportunity to participate in this patient's care. Please do not hesitate to contact me with any questions or concerns.     Torri Pugh MD  Hematology/Oncology

## 2024-10-25 NOTE — ASSESSMENT & PLAN NOTE
Anemia is likely due to chronic disease due to Malignancy and chronic kidney disease. Most recent hemoglobin and hematocrit are listed below.  Recent Labs     10/22/24  1829 10/23/24  0411 10/24/24  0951   HGB 10.6* 10.6* 12.0   HCT 33.8* 33.8* 38.0       Plan  - Monitor serial CBC: Daily  - Transfuse PRBC if patient becomes hemodynamically unstable, symptomatic or H/H drops below 7/21.  - Patient has not received any PRBC transfusions to date  - Patient's anemia is currently stable

## 2024-10-25 NOTE — PROGRESS NOTES
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 Pugh from clinic for hypotension and abnormal labs with acute kidney injury and worsening liver enzymes.  Symptoms were acute onset, moderate severity, and with blood pressure improved with albumin administration given in the emergency department.  Patient denies any associated chest pain, increased shortness of breath, cough, fevers, chills.  She does complain of recurrent episodes of nausea with vomiting.  She has chronic abdominal pain but has noted increasing distention to her abdomen as well as increasing lower extremity swelling.  She denies any diarrhea, constipation, dysuria, or hematuria.  Patient is actively receiving chemotherapy with last dose received 10/15/2024.    Last hospital admit 10/2-10/07/2024 for shortness for breath, new bilateral pleural effusion, pneumonia, and images with worsening metastatic disease.  Patient received IV cefepime.    Overview/Hospital Course:  Admitted to Hospital Medicine for evaluation of constitutional symptoms and labs concerning for MIRELA in setting of poor oral intake.   "Started on increased fluid hydration.  Heme-Onc consulted and after goals of care discussion, plan to continue with treatment at this time.  Evaluation of lower extremity pain/swelling without concerns for DVT.  Evaluation of transaminitis with hepatitis panel negative, and abdominal ultrasound noting previously seen "severely heterogeneous echotexture throughout the liver with poorly defined mass within the left hepatic lobe measuring up to 8.8 cm. Additional smaller hypoechoic lesions seen throughout concerning for metastatic disease. " Negative for gallstones, common bile duct dilation, or Back's sign. Evaluation of transaminitis by Heme-Onc noted likely secondary to hypovolemia with concerns if no improvement could be secondary to advancement of malignancy but less likely given recent chemotherapy resumption. Abdominal ultrasound noted concerns for "Mild to moderate right-sided hydronephrosis", Urology evaluation noted kidney obstruction likely secondary from progression of cancer and after discussion with the patient, patient opted for conservative management at the time.  Discussion with hepatology, Interventional Radiology, Heme-Onc regarding worsened liver function.  Concerns for progression of metastatic disease, goals of care discussions ongoing.    Interval History: No acute events overnight, afebrile, hemodynamically stable.  Patient still reports poor appetite, but has been trying to drink increased fluids, with some improvement in renal function.  Still reports lower extremity swelling. Urology evaluation of hydronephrosis noted kidney obstruction likely secondary from progression of cancer and after discussion with the patient, patient opted for conservative management at the time.  Discussion with hepatology, Interventional Radiology, Heme-Onc regarding worsened liver function.  Concerns for progression of metastatic disease, goals of care discussions ongoing.       Objective:     Vital Signs (Most " Recent):  Temp: 98 °F (36.7 °C) (10/24/24 1937)  Pulse: 91 (10/24/24 2044)  Resp: 18 (10/24/24 2044)  BP: (!) 95/51 (10/24/24 1937)  SpO2: (!) 92 % (10/24/24 2044) Vital Signs (24h Range):  Temp:  [97.7 °F (36.5 °C)-98.6 °F (37 °C)] 98 °F (36.7 °C)  Pulse:  [81-93] 91  Resp:  [18] 18  SpO2:  [90 %-100 %] 92 %  BP: ()/(51-65) 95/51     Weight: 104.6 kg (230 lb 9.6 oz)  Body mass index is 36.12 kg/m².    Intake/Output Summary (Last 24 hours) at 10/24/2024 2244  Last data filed at 10/24/2024 1757  Gross per 24 hour   Intake 960 ml   Output --   Net 960 ml         Physical Exam  Vitals and nursing note reviewed.   Constitutional:       General: She is not in acute distress.     Appearance: She is obese. She is not ill-appearing, toxic-appearing or diaphoretic.   HENT:      Head: Normocephalic and atraumatic.      Mouth/Throat:      Mouth: Mucous membranes are moist.   Eyes:      General: No scleral icterus.        Right eye: No discharge.         Left eye: No discharge.   Cardiovascular:      Rate and Rhythm: Normal rate and regular rhythm.      Heart sounds: Normal heart sounds.   Pulmonary:      Effort: Pulmonary effort is normal. No respiratory distress.      Breath sounds: Normal breath sounds.   Abdominal:      General: Bowel sounds are normal.      Palpations: Abdomen is soft.   Musculoskeletal:         General: Swelling present.      Cervical back: No rigidity.      Right lower leg: Edema present.      Left lower leg: Edema present.   Skin:     General: Skin is warm and dry.      Coloration: Skin is not jaundiced.   Neurological:      Mental Status: She is alert and oriented to person, place, and time. Mental status is at baseline.   Psychiatric:         Mood and Affect: Mood normal.         Behavior: Behavior normal.             Significant Labs: All pertinent labs within the past 24 hours have been reviewed.  LABS:  Recent Labs   Lab 10/22/24  1829 10/23/24  0411 10/24/24  0951    140 143   K 4.4  4.6 4.4    109 110   CO2 19* 20* 22*   BUN 80* 82* 74*   CREATININE 2.4* 2.3* 1.9*   GLU 79 77 82   ANIONGAP 10 11 11     Recent Labs   Lab 10/22/24  1015 10/22/24  1829 10/24/24  0951   MG 2.5 2.4 2.6   PHOS 4.5  --  3.3     Recent Labs   Lab 10/22/24  1829 10/23/24  0411 10/24/24  0951   * 717* 772*   * 237* 266*   ALKPHOS 1,018* 937* 1,072*   BILITOT 1.2* 1.3* 1.7*   ALBUMIN 2.3* 2.7* 2.7*     Recent Labs   Lab 10/22/24  1829 10/23/24  0411 10/24/24  0951   WBC 4.10 3.56* 4.39   HGB 10.6* 10.6* 12.0   HCT 33.8* 33.8* 38.0    220 239   GRAN 75.9*  3.1 67.4  2.4 77.9*  3.4             Significant Imaging: I have reviewed all pertinent imaging results/findings within the past 24 hours.  US Lower Extremity Veins Bilateral   Final Result      No evidence of deep venous thrombosis in either lower extremity.         Electronically signed by: Lauren Barron   Date:    10/23/2024   Time:    20:57      US Abdomen Limited   Final Result      Severe heterogeneous echotexture throughout the liver with metastatic disease as above.         Electronically signed by: Chandrakant Fermin MD   Date:    10/23/2024   Time:    08:32      X-Ray Chest AP Portable   Final Result      1.  Similar degree of vascular congestion versus reticular interstitial changes throughout the lungs with patchy ground-glass opacities.  Stable small to moderate left effusion.  New small right effusion.  Differential considerations would include chronic or recurrent CHF versus interstitial infectious process such as atypical viral pneumonia.  Clinical correlation is advised.      2.  Stable findings as noted above.         Electronically signed by: Partha Miguel MD   Date:    10/22/2024   Time:    17:30          Inpatient Medications:  Continuous Infusions:  Scheduled Meds:   electrolytes-dextrose  600 mL Oral Q4H    enoxparin  1 mg/kg Subcutaneous Q12H (treatment, non-standard time)    mupirocin   Nasal BID    sertraline  25  mg Oral Daily     PRN Meds:  Current Facility-Administered Medications:     albuterol sulfate, 2.5 mg, Nebulization, Q4H PRN    ALPRAZolam, 0.25 mg, Oral, TID PRN    alteplase, 2 mg, Intra-Catheter, PRN    dextrose 10%, 12.5 g, Intravenous, PRN    dextrose 10%, 25 g, Intravenous, PRN    fluticasone propionate, 1 spray, Each Nostril, Q12H PRN    glucagon (human recombinant), 1 mg, Intramuscular, PRN    glucose, 16 g, Oral, PRN    glucose, 24 g, Oral, PRN    hydrALAZINE, 10 mg, Intravenous, Q6H PRN    naloxone, 0.02 mg, Intravenous, PRN    ondansetron, 4 mg, Intravenous, Q6H PRN    oxyCODONE, 5 mg, Oral, Q6H PRN    sodium chloride 0.9%, 10 mL, Intravenous, Q12H PRN      Assessment/Plan:      * Acute renal failure superimposed on stage 3a chronic kidney disease  MIRELA is likely due to pre-renal azotemia due to dehydration related to nausea with vomiting, hypotension, inadequate oral intake, and likely also related to progressive metastatic disease. Baseline creatinine is  1.1-1.3 . Most recent creatinine and eGFR are listed below.  Recent Labs     10/22/24  1829 10/23/24  0411 10/24/24  0951   CREATININE 2.4* 2.3* 1.9*   EGFRNORACEVR 21* 22* 28*        Plan  - MIRELA is improving--> patient received albumin 25% 25 g IV x1 dose in the emergency department. Patient is in need of IV fluids for the treatment of MIRELA.  Due to a shortage of IV fluids, patient to receive oral fluids.  Patient must receive this treatment in a hospital location due to the risk of adverse effects, insufficient response to treatment, or other potential complications of disease. We will encourage increased fluid intake, Pedialyte 600 mL p.o. q.4 hours ordered  - Avoid nephrotoxins and renally dose meds for GFR listed above  - Monitor urine output, serial BMP, and adjust therapy as needed  - Urology evaluation of hydronephrosis on imaging noted kidney obstruction likely secondary from progression of cancer and after discussion with the patient, patient  opted for conservative management at the time.      Normocytic anemia  Anemia is likely due to chronic disease due to Malignancy and chronic kidney disease. Most recent hemoglobin and hematocrit are listed below.  Recent Labs     10/22/24  1829 10/23/24  0411 10/24/24  0951   HGB 10.6* 10.6* 12.0   HCT 33.8* 33.8* 38.0       Plan  - Monitor serial CBC: Daily  - Transfuse PRBC if patient becomes hemodynamically unstable, symptomatic or H/H drops below 7/21.  - Patient has not received any PRBC transfusions to date  - Patient's anemia is currently stable      History of DVT (deep vein thrombosis)  -recent left lower extremity venous ultrasound 10/15/2024 was negative for DVT  -bilateral lower extremity edema most likely related to metastatic disease and hypoalbuminemia      PLAN:  -Repeat ultrasound negative for DVT  -treatment dose Lovenox (renally dosed) for now    Chronic diastolic CHF (congestive heart failure)  Patient has Diastolic (HFpEF) heart failure that is Chronic. On presentation their CHF was well compensated. Most recent BNP and echo results are listed below.  Recent Labs     10/22/24  1829   BNP 27       Latest ECHO  Results for orders placed during the hospital encounter of 09/03/24    Echo    Interpretation Summary    Left Ventricle: The left ventricle is normal in size. Normal wall thickness. There is normal systolic function with a visually estimated ejection fraction of 60 - 65%. Biplane (2D) method of discs ejection fraction is 56%. Global longitudinal strain is -19.5%. There is normal diastolic function.    Right Ventricle: Normal right ventricular cavity size. Wall thickness is normal. Systolic function is normal.    IVC/SVC: Normal venous pressure at 3 mmHg.    Malignant neoplasm of both ovaries    Baseline echo 9/3/2024  NORM -19.47%  Biplane 56%    Current Heart Failure Medications  hydrALAZINE injection 10 mg, Every 6 hours PRN, Intravenous    Plan  - Monitor strict I&Os and daily weights.   "  - Place on telemetry  - Cardiology has not been consulted  - The patient's volume status is at their baseline    Chronic respiratory failure with hypoxia  Patient with Hypoxic Respiratory failure which is Chronic.  she is on home oxygen at 3-4 LPM. Supplemental oxygen was provided and noted- Oxygen Concentration (%):  [36] 36    -CXR this admit with " Similar degree of vascular congestion versus reticular interstitial changes throughout the lungs with patchy ground-glass opacities.  Stable small to moderate left effusion.  New small right effusion.  Differential considerations would include chronic or recurrent CHF versus interstitial infectious process such as atypical viral pneumonia.  Clinical correlation is advised."  -recent CTA of chest 10/02/2024 was reviewed  -initial labs notable for white blood cell count 4.10, lactic acid level 1.0, BNP 27, afebrile  -procalcitonin level elevated, however utility limited given MIRELA    -no antibiotics warranted at this time  -no diuretics warranted at this time  -continuous pulse oximetry monitoring, O2 supplementation, incentive spirometry, and p.r.n. albuterol inhaler    Elevated liver enzymes  Upon admission, alk phos 1018, total bilirubin 1.2, , , INR 1.2, BNP 27, lactic acid level 1.0, creatinine 2.4  -Evaluation of transaminitis with hepatitis panel negative, and abdominal ultrasound noting previously seen "severely heterogeneous echotexture throughout the liver with poorly defined mass within the left hepatic lobe measuring up to 8.8 cm. Additional smaller hypoechoic lesions seen throughout concerning for metastatic disease. " Negative for gallstones, common bile duct dilation, or Back's sign.   -Evaluation of transaminitis by Heme-Onc noted likely secondary to hypovolemia with concerns if no improvement could be secondary to advancement of malignancy but less likely given recent chemotherapy resumption.      -hold Crestor and avoid hepatotoxic " "agents  -recent CT scan of abdomen/pelvis and PET scan were reviewed  -10/24/24 Discussion with hepatology, Interventional Radiology, Heme-Onc regarding worsened liver function.  Concerns for progression of metastatic disease, goals of care discussions ongoing.       Hypotension due to hypovolemia  -hypotension improved with IV albumin given in the emergency department  -hold Norvasc, atenolol, Benicar/HCT  -limit sedating agents      Nausea & vomiting  -bland diet, Pedialyte scheduled, IV albumin x1 dose  -p.r.n. Antiemetics        Transaminitis  Recent Labs     10/22/24  1829 10/23/24  0411 10/24/24  0951   * 717* 772*   * 237* 266*         10/24/24 Discussion with hepatology, Interventional Radiology, Heme-Onc regarding worsened liver function.  Concerns for progression of metastatic disease, goals of care discussions ongoing.       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 New moderate-sized bilateral pleural effusions as described above with adjacent atelectasis or consolidation.No enlarged mediastinal and hilar lymph nodes. Intralobular septal thickening and associated groundglass opacity bilaterally felt to represent edema.New irregular soft tissue mass in the left upper quadrant concerning for carcinomatosis."  -CT scan of abdomen and pelvis 10/06/2024 with Widespread metastatic disease suspected with abnormal lymph nodes within the retroperitoneum, jovan hepatis, mediastinum as well as multiple abnormal liver lesions and focal thickening within the large bowel. Patient needs tissue sampling of liver lesion. Recommend colonoscopy and possible biopsy of sigmoid lesion."  -nuclear medicine PET scan 10/11/2024 with Overall there " "has been progression of disease. There is continued hypermetabolic adenopathy within the neck, chest (mediastinum and chest wall).  Some of these areas are stable with some nodes slightly diminished and some decreased degrees of FDG uptake within some of these nodes. However, there is extensive progression of peritoneal carcinomatosis throughout the abdomen and pelvis with increasing number and size of peritoneal hypermetabolic nodularity and increasing amount of ascites.  There is also large amount heterogeneous FDG uptake within the left hepatic lobe concerning for hepatic metastatic disease which is new. there has also been interval development moderate right-sided hydronephrosis and hydroureter with ureter likely being obstructed by peritoneal implants. increasing small to moderate bilateral loculated pleural effusions.  Extensive mixed interstitial and ground-glass infiltrates throughout the lungs.  Question pneumonia/post treatment related changes.  Lymphangitic carcinomatosis could appear similar"      -Heme-Onc consulted and after goals of care discussion, plan to continue with treatment at this time.  Follow up recs.   - Discussion with hepatology, Interventional Radiology, Heme-Onc regarding worsened liver function.  Concerns for progression of metastatic disease, goals of care discussions ongoing.   -p.r.n. pain control            Peritoneal carcinomatosis  See discussion for bilateral ovarian cancer    Hyperlipidemia  -hold Crestor in the setting of worsening LFTs          VTE Risk Mitigation (From admission, onward)           Ordered     enoxaparin injection 105 mg  Every 12 hours         10/24/24 1139     IP VTE HIGH RISK PATIENT  Once         10/22/24 2032     Place sequential compression device  Until discontinued         10/22/24 2032                    Discharge Planning   EVE:      Code Status: Full Code   Is the patient medically ready for discharge?:     Reason for patient still in hospital " (select all that apply): Patient trending condition, Laboratory test, Treatment, and Consult recommendations  Discharge Plan A: Home with family            Anival Hernandez DO  Department of Hospital Medicine   [facility]    Voice recognition software was used in the creation of this note/communication and any sound-alike/typographical errors which may have occurred despite initial review prior to signing should be taken in context when interpreting.  If such errors prevent a clear understanding of the note/communication, please contact the provider/office for clarification.

## 2024-10-25 NOTE — PROGRESS NOTES
[facility] - [facility]urg  Adult Nutrition  Progress Note    SUMMARY       Recommendations    1. Continue on a regular/general healthful diet, consider bland restriction due to nausea and vomiting   2. Continue on antiemetics   3. Monitor labs and weights  4. Recommend commercial beverage medical food supplement therapy: Boost, to help fill any nutrition gaps while appetite is reduced  5. Collaboration by nutrition professional with other providers    Goals:   Patient to consume >75% of EEN/EPN prior to RD follow up  Nutrition Goal Status: new  Communication of RD Recs: other (comment) (POC)    Assessment and Plan    Endocrine  Moderate malnutrition  Malnutrition Type:  Context: chronic illness  Level: moderate    Related to (etiology):   Physiological causes increasing needs due to illness: Cancer and CKD    Signs and Symptoms (as evidenced by):   Unintentional weight loss  Estimated intake of food less than estimated needs    Malnutrition Characteristic Summary:  Weight Loss (Malnutrition): 10% in 6 months  Energy Intake (Malnutrition): less than or equal to 75% for greater than or equal to 1 month  Fluid Accumulation (Malnutrition): mild      Interventions (treatment strategy):  1. General Healthful Diet    2. Commercial beverage medical food supplement therapy   3. Collaboration by nutrition professional with other providers    Nutrition Diagnosis Status:   New         Malnutrition Assessment  Malnutrition Context: chronic illness  Malnutrition Level: moderate  Musculoskeletal/Lower Extremities: edema   Micronutrient Evaluation Summary: suspected deficiency   Weight Loss (Malnutrition): 10% in 6 months  Energy Intake (Malnutrition): less than or equal to 75% for greater than or equal to 1 month  Fluid Accumulation (Malnutrition): mild                         Reason for Assessment    Reason For Assessment: identified at risk by screening criteria    Diagnosis: cancer diagnosis/related complications, renal  disease  Patient Active Problem List   Diagnosis    Hypertension    Osteoarthritis of both knees    Hyperlipidemia    Peritoneal carcinomatosis    Morbid obesity with BMI of 40.0-44.9, adult    Status post placement of ureteral stent    Abnormal ECG    Dyspnea on exertion    Pulmonary HTN    Ovarian cancer, bilateral    S/P BSO (bilateral salpingo-oophorectomy)    Encounter for antineoplastic chemotherapy    Anxiety    DDD (degenerative disc disease), cervical    Family history of colon cancer    Acute right-sided thoracic back pain    Acute right-sided low back pain without sciatica    Sciatic nerve pain    Vasovagal reaction    Drug reaction    Infusion reaction    Left groin pain    Pruritus    Decreased functional mobility and endurance    Decreased range of motion of lumbar spine    Proximal muscle weakness    Sleep-disordered breathing    Chemotherapy-induced peripheral neuropathy    JERALD (obstructive sleep apnea)    CAP (community acquired pneumonia)    Hypoxia    Transaminitis    Chronic kidney disease, stage 3a    Acute renal failure superimposed on stage 3a chronic kidney disease    Nausea & vomiting    Hypotension due to hypovolemia    Elevated liver enzymes    Chronic respiratory failure with hypoxia    Chronic diastolic CHF (congestive heart failure)    History of DVT (deep vein thrombosis)    Normocytic anemia    Moderate malnutrition     Past Medical History:   Diagnosis Date    Anemia     Anxiety     Arthritis     knees    Cancer     ovarian    CHF (congestive heart failure)     Hx antineoplastic chemotherapy     last 6/2019    Hyperlipemia     Hypertension     Neck pain     Ovarian cancer 2019    CHEMO    Peritoneal carcinomatosis 01/26/2019     General Information Comments:   10/25/2024: Patient is on a regular oral diet.  Patient admitted with liver injury and MIRELA on CKD due to dehydration and po intake.  Patient with diagnosis of ovarian cancer on chemotherapy.  Patient has been experiencing  "nausea, vomiting, abdominal discomfort.  2-3+ mild to moderate edema in BLE.  Weight loss from 116kg within 6 months.  Labs reviewed.  NKFA.  LBM: 10/24/2024.  RD to continue to encourage po intake of oral diet and supplements along with monitor tolerance.    Nutrition Discharge Planning: General Healthful Diet    Nutrition Risk Screen    Nutrition Risk Screen: reduced oral intake over the last month  Nutrition Related Social Determinants of Health: SDOH: Adequate food in home environment and None Identified    Nutrition/Diet History    Spiritual, Cultural Beliefs, [employer] Practices, Values that Affect Care: no  Food Allergies: NKFA  Factors Affecting Nutritional Intake: nausea/vomiting, decreased appetite    Anthropometrics    Temp: 98.7 °F (37.1 °C)  Height Method: Stated  Height: 5' 7" (170.2 cm)  Height (inches): 67 in  Weight Method: Bed Scale  Weight: 104.6 kg (230 lb 9.6 oz)  Weight (lb): 230.6 lb  Ideal Body Weight (IBW), Female: 135 lb  % Ideal Body Weight, Female (lb): 170.81 %  BMI (Calculated): 36.1  BMI Grade: 35 - 39.9 - obesity - grade II  Weight Loss: unintentional  Usual Body Weight (UBW), k kg (within 6 months)  % Usual Body Weight: 90.36  % Weight Change From Usual Weight: -9.83 %       Lab/Procedures/Meds    Pertinent Labs Reviewed: reviewed  BMP  Lab Results   Component Value Date     10/24/2024    K 4.4 10/24/2024     10/24/2024    CO2 22 (L) 10/24/2024    BUN 74 (H) 10/24/2024    CREATININE 1.9 (H) 10/24/2024    CALCIUM 9.0 10/24/2024    ANIONGAP 11 10/24/2024    EGFRNORACEVR 28 (A) 10/24/2024     Lab Results   Component Value Date    HGBA1C 5.8 (H) 2023     Lab Results   Component Value Date    CALCIUM 9.0 10/24/2024    PHOS 3.3 10/24/2024     Glucose   Date Value Ref Range Status   10/24/2024 82 70 - 110 mg/dL Final     Lab Results   Component Value Date     (H) 10/24/2024     (H) 10/24/2024    ALKPHOS 1,072 (H) 10/24/2024    BILITOT 1.7 (H) 10/24/2024 "       Pertinent Medications Reviewed: reviewed  Scheduled Meds:   electrolytes-dextrose  600 mL Oral Q4H    enoxparin  1 mg/kg Subcutaneous Q12H (treatment, non-standard time)    mupirocin   Nasal BID    sertraline  25 mg Oral Daily     Continuous Infusions:  PRN Meds:.  Current Facility-Administered Medications:     albuterol sulfate, 2.5 mg, Nebulization, Q4H PRN    ALPRAZolam, 0.25 mg, Oral, TID PRN    alteplase, 2 mg, Intra-Catheter, PRN    dextrose 10%, 12.5 g, Intravenous, PRN    dextrose 10%, 25 g, Intravenous, PRN    fluticasone propionate, 1 spray, Each Nostril, Q12H PRN    glucagon (human recombinant), 1 mg, Intramuscular, PRN    glucose, 16 g, Oral, PRN    glucose, 24 g, Oral, PRN    hydrALAZINE, 10 mg, Intravenous, Q6H PRN    naloxone, 0.02 mg, Intravenous, PRN    ondansetron, 4 mg, Intravenous, Q6H PRN    oxyCODONE, 5 mg, Oral, Q6H PRN    sodium chloride 0.9%, 10 mL, Intravenous, Q12H PRN    Physical Findings/Assessment         Estimated/Assessed Needs    Weight Used For Calorie Calculations: 104.6 kg (230 lb 9.6 oz)  Energy Calorie Requirements (kcal): 20-25kcals/kg (2092-2615kcals/day)  Energy Need Method: Kcal/kg  Protein Requirements: 0.8g/kg, 84g/day (MIRELA on CKD, Cancer,)  Weight Used For Protein Calculations: 104.6 kg (230 lb 9.6 oz)  Fluid Requirements (mL): Per md RD order or 1ml/kcal  Estimated Fluid Requirement Method: RDA Method  RDA Method (mL): 20  CHO Requirement: 250      Nutrition Prescription Ordered    Current Diet Order: Regular    Evaluation of Received Nutrient/Fluid Intake    % Kcal Needs: 50% x 1 meal  % Protein Needs: 50% x 1 meal  I/O: 10/24: +2095mL  Energy Calories Required: not meeting needs  Protein Required: not meeting needs  Fluid Required: not meeting needs  Comments: LBM: 10/24/24  Tolerance: not tolerating  % Intake of Estimated Energy Needs: 25 - 50 %  % Meal Intake: 25 - 50 %    Nutrition Risk    Level of Risk/Frequency of Follow-up: moderate (follow up: 1-2x per  week)     Monitor and Evaluation    Food and Nutrient Intake: energy intake  Food and Nutrient Adminstration: diet order  Physical Activity and Function: nutrition-related ADLs and IADLs, factors affecting access to physical activity  Anthropometric Measurements: height/length, weight, weight change, body mass index  Biochemical Data, Medical Tests and Procedures: electrolyte and renal panel, gastrointestinal profile, glucose/endocrine profile, inflammatory profile, lipid profile     Nutrition Follow-Up    RD Follow-up?: Yes  Olivia Jack, MS, RDN, LDN

## 2024-10-25 NOTE — ASSESSMENT & PLAN NOTE
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 New moderate-sized bilateral pleural effusions as described above with adjacent atelectasis or consolidation.No enlarged mediastinal and hilar lymph nodes. Intralobular septal thickening and associated groundglass opacity bilaterally felt to represent edema.New irregular soft tissue mass in the left upper quadrant concerning for carcinomatosis."  -CT scan of abdomen and pelvis 10/06/2024 with Widespread metastatic disease suspected with abnormal lymph nodes within the retroperitoneum, jovan hepatis, mediastinum as well as multiple abnormal liver lesions and focal thickening within the large bowel. Patient needs tissue sampling of liver lesion. Recommend colonoscopy and possible biopsy of sigmoid lesion."  -nuclear medicine PET scan 10/11/2024 with Overall there has been progression of disease. There is continued hypermetabolic adenopathy within the neck, chest (mediastinum and chest wall).  Some of these areas are stable with some nodes slightly diminished and some decreased degrees of FDG uptake within some of these nodes. However, there is extensive progression of peritoneal carcinomatosis throughout the abdomen and pelvis with increasing number and size of peritoneal hypermetabolic nodularity and increasing amount of ascites.  There is also large amount heterogeneous FDG uptake within the left hepatic lobe concerning for hepatic metastatic disease which is new. there has also been interval development moderate right-sided hydronephrosis and hydroureter with ureter likely being obstructed by peritoneal implants. increasing small to moderate bilateral loculated pleural effusions.  Extensive mixed interstitial and ground-glass infiltrates throughout the lungs.  Question " "pneumonia/post treatment related changes.  Lymphangitic carcinomatosis could appear similar"      -Heme-Onc consulted and after goals of care discussion, plan to continue with treatment at this time.  Follow up recs.   - Discussion with hepatology, Interventional Radiology, Heme-Onc regarding worsened liver function.  Concerns for progression of metastatic disease, goals of care discussions ongoing.   -p.r.n. pain control          "

## 2024-10-25 NOTE — ASSESSMENT & PLAN NOTE
"Upon admission, alk phos 1018, total bilirubin 1.2, , , INR 1.2, BNP 27, lactic acid level 1.0, creatinine 2.4  -Evaluation of transaminitis with hepatitis panel negative, and abdominal ultrasound noting previously seen "severely heterogeneous echotexture throughout the liver with poorly defined mass within the left hepatic lobe measuring up to 8.8 cm. Additional smaller hypoechoic lesions seen throughout concerning for metastatic disease. " Negative for gallstones, common bile duct dilation, or Back's sign.   -Evaluation of transaminitis by Heme-Onc noted likely secondary to hypovolemia with concerns if no improvement could be secondary to advancement of malignancy but less likely given recent chemotherapy resumption.      -hold Crestor and avoid hepatotoxic agents  -recent CT scan of abdomen/pelvis and PET scan were reviewed  -10/24/24 Discussion with hepatology, Interventional Radiology, Heme-Onc regarding worsened liver function.  Concerns for progression of metastatic disease, goals of care discussions ongoing.     "

## 2024-10-25 NOTE — SUBJECTIVE & OBJECTIVE
Interval History: No acute events overnight, afebrile, hemodynamically stable.  Patient still reports poor appetite, but has been trying to drink increased fluids, with some improvement in renal function.  Still reports lower extremity swelling. Concerns for progression of metastatic disease, Heme-Onc following, goals of care discussions ongoing.       Objective:     Vital Signs (Most Recent):  Temp: 98.3 °F (36.8 °C) (10/25/24 1624)  Pulse: 91 (10/25/24 1717)  Resp: 18 (10/25/24 1624)  BP: 110/61 (10/25/24 1624)  SpO2: (!) 87 % (10/25/24 1624) Vital Signs (24h Range):  Temp:  [97.4 °F (36.3 °C)-98.7 °F (37.1 °C)] 98.3 °F (36.8 °C)  Pulse:  [] 91  Resp:  [16-18] 18  SpO2:  [87 %-97 %] 87 %  BP: ()/(51-61) 110/61     Weight: 104.6 kg (230 lb 9.6 oz)  Body mass index is 36.12 kg/m².  No intake or output data in the 24 hours ending 10/25/24 1900        Physical Exam  Vitals and nursing note reviewed.   Constitutional:       General: She is not in acute distress.     Appearance: She is obese. She is ill-appearing. She is not toxic-appearing or diaphoretic.   HENT:      Head: Normocephalic and atraumatic.      Mouth/Throat:      Mouth: Mucous membranes are moist.   Eyes:      General: No scleral icterus.        Right eye: No discharge.         Left eye: No discharge.   Cardiovascular:      Rate and Rhythm: Normal rate and regular rhythm.      Heart sounds: Normal heart sounds.   Pulmonary:      Effort: Pulmonary effort is normal. No respiratory distress.      Breath sounds: Normal breath sounds.   Abdominal:      General: Bowel sounds are normal.      Palpations: Abdomen is soft.   Musculoskeletal:         General: Swelling present.      Cervical back: No rigidity.      Right lower leg: Edema present.      Left lower leg: Edema present.   Skin:     General: Skin is warm and dry.      Coloration: Skin is not jaundiced.   Neurological:      Mental Status: She is alert and oriented to person, place, and time.  Mental status is at baseline.   Psychiatric:         Mood and Affect: Mood normal.         Behavior: Behavior normal.             Significant Labs: All pertinent labs within the past 24 hours have been reviewed.  LABS:  Recent Labs   Lab 10/23/24  0411 10/24/24  0951 10/25/24  0849    143 142   K 4.6 4.4 4.2    110 110   CO2 20* 22* 21*   BUN 82* 74* 69*   CREATININE 2.3* 1.9* 1.7*   GLU 77 82 89   ANIONGAP 11 11 11     Recent Labs   Lab 10/22/24  1015 10/22/24  1829 10/24/24  0951 10/25/24  0849   MG 2.5 2.4 2.6 2.5   PHOS 4.5  --  3.3 3.3     Recent Labs   Lab 10/23/24  0411 10/24/24  0951 10/25/24  0849   * 772* 787*   * 266* 273*   ALKPHOS 937* 1,072* 1,117*   BILITOT 1.3* 1.7* 1.9*   ALBUMIN 2.7* 2.7* 2.5*     Recent Labs   Lab 10/23/24  0411 10/24/24  0951 10/25/24  0849   WBC 3.56* 4.39 4.37   HGB 10.6* 12.0 11.9*   HCT 33.8* 38.0 39.5    239 227   GRAN 67.4  2.4 77.9*  3.4 77.7*  3.4             Significant Imaging: I have reviewed all pertinent imaging results/findings within the past 24 hours.  US Lower Extremity Veins Bilateral   Final Result      No evidence of deep venous thrombosis in either lower extremity.         Electronically signed by: Lauren Barron   Date:    10/23/2024   Time:    20:57      US Abdomen Limited   Final Result      Severe heterogeneous echotexture throughout the liver with metastatic disease as above.         Electronically signed by: Chandrakant Fermin MD   Date:    10/23/2024   Time:    08:32      X-Ray Chest AP Portable   Final Result      1.  Similar degree of vascular congestion versus reticular interstitial changes throughout the lungs with patchy ground-glass opacities.  Stable small to moderate left effusion.  New small right effusion.  Differential considerations would include chronic or recurrent CHF versus interstitial infectious process such as atypical viral pneumonia.  Clinical correlation is advised.      2.  Stable findings as  noted above.         Electronically signed by: Partha Miguel MD   Date:    10/22/2024   Time:    17:30          Inpatient Medications:  Continuous Infusions:  Scheduled Meds:   electrolytes-dextrose  600 mL Oral Q4H    enoxparin  1 mg/kg Subcutaneous Q12H (treatment, non-standard time)    mupirocin   Nasal BID    sertraline  25 mg Oral Daily     PRN Meds:  Current Facility-Administered Medications:     albuterol sulfate, 2.5 mg, Nebulization, Q4H PRN    ALPRAZolam, 0.25 mg, Oral, TID PRN    alteplase, 2 mg, Intra-Catheter, PRN    dextrose 10%, 12.5 g, Intravenous, PRN    dextrose 10%, 25 g, Intravenous, PRN    fluticasone propionate, 1 spray, Each Nostril, Q12H PRN    glucagon (human recombinant), 1 mg, Intramuscular, PRN    glucose, 16 g, Oral, PRN    glucose, 24 g, Oral, PRN    hydrALAZINE, 10 mg, Intravenous, Q6H PRN    naloxone, 0.02 mg, Intravenous, PRN    ondansetron, 4 mg, Intravenous, Q6H PRN    oxyCODONE, 5 mg, Oral, Q6H PRN    prochlorperazine, 2.5 mg, Intravenous, Q6H PRN    sodium chloride 0.9%, 10 mL, Intravenous, Q12H PRN

## 2024-10-25 NOTE — ASSESSMENT & PLAN NOTE
-recent left lower extremity venous ultrasound 10/15/2024 was negative for DVT  -bilateral lower extremity edema most likely related to metastatic disease and hypoalbuminemia      PLAN:  -Repeat ultrasound negative for DVT  -treatment dose Lovenox (renally dosed) for now

## 2024-10-25 NOTE — PLAN OF CARE
Nutrition Plan of Care:    Recommendations     1. Continue on a regular/general healthful diet, consider bland restriction due to nausea and vomiting   2. Continue on antiemetics   3. Monitor labs and weights  4. Recommend commercial beverage medical food supplement therapy: Boost, to help fill any nutrition gaps while appetite is reduced  5. Collaboration by nutrition professional with other providers     Goals:   Patient to consume >75% of EEN/EPN prior to RD follow up  Nutrition Goal Status: new  Communication of RD Recs: other (comment) (POC)     Assessment and Plan     Endocrine  Moderate malnutrition  Malnutrition Type:  Context: chronic illness  Level: moderate     Related to (etiology):   Physiological causes increasing needs due to illness: Cancer and CKD     Signs and Symptoms (as evidenced by):   Unintentional weight loss  Estimated intake of food less than estimated needs     Malnutrition Characteristic Summary:  Weight Loss (Malnutrition): 10% in 6 months  Energy Intake (Malnutrition): less than or equal to 75% for greater than or equal to 1 month  Fluid Accumulation (Malnutrition): mild        Interventions (treatment strategy):  1. General Healthful Diet    2. Commercial beverage medical food supplement therapy   3. Collaboration by nutrition professional with other providers     Nutrition Diagnosis Status:   Nas Jack, MS, RDN, LDN

## 2024-10-25 NOTE — PLAN OF CARE
Problem: Skin Injury Risk Increased  Goal: Skin Health and Integrity  Outcome: Progressing     Problem: Adult Inpatient Plan of Care  Goal: Plan of Care Review  Outcome: Progressing  Goal: Patient-Specific Goal (Individualized)  Outcome: Progressing  Goal: Absence of Hospital-Acquired Illness or Injury  Outcome: Progressing  Goal: Optimal Comfort and Wellbeing  Outcome: Progressing  Goal: Readiness for Transition of Care  Outcome: Progressing     Problem: Infection  Goal: Absence of Infection Signs and Symptoms  Outcome: Progressing     Problem: Acute Kidney Injury/Impairment  Goal: Fluid and Electrolyte Balance  Outcome: Progressing  Goal: Improved Oral Intake  Outcome: Progressing  Goal: Effective Renal Function  Outcome: Progressing     Problem: Pneumonia  Goal: Fluid Balance  Outcome: Progressing  Goal: Resolution of Infection Signs and Symptoms  Outcome: Progressing  Goal: Effective Oxygenation and Ventilation  Outcome: Progressing

## 2024-10-26 LAB
ALBUMIN SERPL BCP-MCNC: 2.3 G/DL (ref 3.5–5.2)
ALP SERPL-CCNC: 965 U/L (ref 40–150)
ALT SERPL W/O P-5'-P-CCNC: 259 U/L (ref 10–44)
ANION GAP SERPL CALC-SCNC: 7 MMOL/L (ref 8–16)
ANISOCYTOSIS BLD QL SMEAR: SLIGHT
AST SERPL-CCNC: 744 U/L (ref 10–40)
BASOPHILS # BLD AUTO: 0.05 K/UL (ref 0–0.2)
BASOPHILS NFR BLD: 1.2 % (ref 0–1.9)
BILIRUB SERPL-MCNC: 1.9 MG/DL (ref 0.1–1)
BUN SERPL-MCNC: 61 MG/DL (ref 8–23)
CALCIUM SERPL-MCNC: 8.8 MG/DL (ref 8.7–10.5)
CHLORIDE SERPL-SCNC: 110 MMOL/L (ref 95–110)
CO2 SERPL-SCNC: 23 MMOL/L (ref 23–29)
CREAT SERPL-MCNC: 1.7 MG/DL (ref 0.5–1.4)
DACRYOCYTES BLD QL SMEAR: ABNORMAL
DIFFERENTIAL METHOD BLD: ABNORMAL
EOSINOPHIL # BLD AUTO: 0.2 K/UL (ref 0–0.5)
EOSINOPHIL NFR BLD: 4.8 % (ref 0–8)
ERYTHROCYTE [DISTWIDTH] IN BLOOD BY AUTOMATED COUNT: 19.7 % (ref 11.5–14.5)
EST. GFR  (NO RACE VARIABLE): 32 ML/MIN/1.73 M^2
GLUCOSE SERPL-MCNC: 77 MG/DL (ref 70–110)
HCT VFR BLD AUTO: 35.4 % (ref 37–48.5)
HGB BLD-MCNC: 11.1 G/DL (ref 12–16)
IMM GRANULOCYTES # BLD AUTO: 0.02 K/UL (ref 0–0.04)
IMM GRANULOCYTES NFR BLD AUTO: 0.5 % (ref 0–0.5)
LYMPHOCYTES # BLD AUTO: 0.7 K/UL (ref 1–4.8)
LYMPHOCYTES NFR BLD: 16.7 % (ref 18–48)
MAGNESIUM SERPL-MCNC: 2.6 MG/DL (ref 1.6–2.6)
MCH RBC QN AUTO: 28 PG (ref 27–31)
MCHC RBC AUTO-ENTMCNC: 31.4 G/DL (ref 32–36)
MCV RBC AUTO: 89 FL (ref 82–98)
MONOCYTES # BLD AUTO: 0.2 K/UL (ref 0.3–1)
MONOCYTES NFR BLD: 3.6 % (ref 4–15)
NEUTROPHILS # BLD AUTO: 3 K/UL (ref 1.8–7.7)
NEUTROPHILS NFR BLD: 73.2 % (ref 38–73)
NRBC BLD-RTO: 0 /100 WBC
PHOSPHATE SERPL-MCNC: 3.4 MG/DL (ref 2.7–4.5)
PLATELET # BLD AUTO: 202 K/UL (ref 150–450)
PLATELET BLD QL SMEAR: ABNORMAL
PMV BLD AUTO: 10.7 FL (ref 9.2–12.9)
POTASSIUM SERPL-SCNC: 4.4 MMOL/L (ref 3.5–5.1)
PROT SERPL-MCNC: 5.5 G/DL (ref 6–8.4)
RBC # BLD AUTO: 3.97 M/UL (ref 4–5.4)
SCHISTOCYTES BLD QL SMEAR: PRESENT
SODIUM SERPL-SCNC: 140 MMOL/L (ref 136–145)
TARGETS BLD QL SMEAR: ABNORMAL
WBC # BLD AUTO: 4.13 K/UL (ref 3.9–12.7)

## 2024-10-26 PROCEDURE — 25000003 PHARM REV CODE 250: Mod: HCNC | Performed by: INTERNAL MEDICINE

## 2024-10-26 PROCEDURE — 94761 N-INVAS EAR/PLS OXIMETRY MLT: CPT | Mod: HCNC

## 2024-10-26 PROCEDURE — 85025 COMPLETE CBC W/AUTO DIFF WBC: CPT | Mod: HCNC | Performed by: STUDENT IN AN ORGANIZED HEALTH CARE EDUCATION/TRAINING PROGRAM

## 2024-10-26 PROCEDURE — 25000003 PHARM REV CODE 250: Mod: HCNC | Performed by: STUDENT IN AN ORGANIZED HEALTH CARE EDUCATION/TRAINING PROGRAM

## 2024-10-26 PROCEDURE — P9047 ALBUMIN (HUMAN), 25%, 50ML: HCPCS | Mod: JZ,JG,HCNC | Performed by: STUDENT IN AN ORGANIZED HEALTH CARE EDUCATION/TRAINING PROGRAM

## 2024-10-26 PROCEDURE — 80053 COMPREHEN METABOLIC PANEL: CPT | Mod: HCNC | Performed by: STUDENT IN AN ORGANIZED HEALTH CARE EDUCATION/TRAINING PROGRAM

## 2024-10-26 PROCEDURE — 63600175 PHARM REV CODE 636 W HCPCS: Mod: HCNC | Performed by: STUDENT IN AN ORGANIZED HEALTH CARE EDUCATION/TRAINING PROGRAM

## 2024-10-26 PROCEDURE — 63600175 PHARM REV CODE 636 W HCPCS: Mod: HCNC | Performed by: INTERNAL MEDICINE

## 2024-10-26 PROCEDURE — 99900035 HC TECH TIME PER 15 MIN (STAT): Mod: HCNC

## 2024-10-26 PROCEDURE — 21400001 HC TELEMETRY ROOM: Mod: HCNC

## 2024-10-26 PROCEDURE — 27000221 HC OXYGEN, UP TO 24 HOURS: Mod: HCNC

## 2024-10-26 PROCEDURE — 83735 ASSAY OF MAGNESIUM: CPT | Mod: HCNC | Performed by: STUDENT IN AN ORGANIZED HEALTH CARE EDUCATION/TRAINING PROGRAM

## 2024-10-26 PROCEDURE — 84100 ASSAY OF PHOSPHORUS: CPT | Mod: HCNC | Performed by: STUDENT IN AN ORGANIZED HEALTH CARE EDUCATION/TRAINING PROGRAM

## 2024-10-26 RX ORDER — ENOXAPARIN SODIUM 100 MG/ML
40 INJECTION SUBCUTANEOUS EVERY 24 HOURS
Status: DISCONTINUED | OUTPATIENT
Start: 2024-10-27 | End: 2024-10-29

## 2024-10-26 RX ORDER — ALBUMIN HUMAN 250 G/1000ML
100 SOLUTION INTRAVENOUS ONCE
Status: COMPLETED | OUTPATIENT
Start: 2024-10-26 | End: 2024-10-26

## 2024-10-26 RX ADMIN — MUPIROCIN: 20 OINTMENT TOPICAL at 08:10

## 2024-10-26 RX ADMIN — ONDANSETRON 4 MG: 2 INJECTION INTRAMUSCULAR; INTRAVENOUS at 09:10

## 2024-10-26 RX ADMIN — SERTRALINE HYDROCHLORIDE 25 MG: 25 TABLET ORAL at 08:10

## 2024-10-26 RX ADMIN — ALBUMIN (HUMAN) 100 G: 5 SOLUTION INTRAVENOUS at 01:10

## 2024-10-26 RX ADMIN — ONDANSETRON 4 MG: 2 INJECTION INTRAMUSCULAR; INTRAVENOUS at 08:10

## 2024-10-26 RX ADMIN — ENOXAPARIN SODIUM 105 MG: 120 INJECTION SUBCUTANEOUS at 12:10

## 2024-10-26 RX ADMIN — PROCHLORPERAZINE EDISYLATE 2.5 MG: 5 INJECTION INTRAMUSCULAR; INTRAVENOUS at 06:10

## 2024-10-26 RX ADMIN — ENOXAPARIN SODIUM 105 MG: 120 INJECTION SUBCUTANEOUS at 11:10

## 2024-10-26 RX ADMIN — ONDANSETRON 4 MG: 2 INJECTION INTRAMUSCULAR; INTRAVENOUS at 03:10

## 2024-10-26 RX ADMIN — PROCHLORPERAZINE EDISYLATE 2.5 MG: 5 INJECTION INTRAMUSCULAR; INTRAVENOUS at 11:10

## 2024-10-26 NOTE — ASSESSMENT & PLAN NOTE
MIRELA is likely due to pre-renal azotemia due to dehydration related to nausea with vomiting, hypotension, inadequate oral intake, and likely also related to progressive metastatic disease. Baseline creatinine is  1.1-1.3 . Most recent creatinine and eGFR are listed below.  Recent Labs     10/23/24  0411 10/24/24  0951 10/25/24  0849   CREATININE 2.3* 1.9* 1.7*   EGFRNORACEVR 22* 28* 32*        Plan  - MIRELA is improving--> patient received albumin 25% 25 g IV x1 dose in the emergency department. Patient is in need of IV fluids for the treatment of MIRELA.  Due to a shortage of IV fluids, patient to receive oral fluids.  Patient must receive this treatment in a hospital location due to the risk of adverse effects, insufficient response to treatment, or other potential complications of disease. We will encourage increased fluid intake, Pedialyte 600 mL p.o. q.4 hours ordered  - Avoid nephrotoxins and renally dose meds for GFR listed above  - Monitor urine output, serial BMP, and adjust therapy as needed  - Urology evaluation of hydronephrosis on imaging noted kidney obstruction likely secondary from progression of cancer and after discussion with the patient, patient opted for conservative management at the time.

## 2024-10-26 NOTE — PLAN OF CARE
Discussed poc with pt, pt verbalized understanding  Purposeful rounding every 2hours  VS wnl  Cardiac monitoring in use, pt is NSR, tele monitor #[id number]  Fall precautions in place, remains injury free  Pain and nausea under control with PRN meds  IVFs  Accurate I&Os  Bed locked at lowest position  Call light within reach  Chart check complete  Will cont with POC    Problem: Skin Injury Risk Increased  Goal: Skin Health and Integrity  Outcome: Progressing     Problem: Adult Inpatient Plan of Care  Goal: Plan of Care Review  Outcome: Progressing  Goal: Patient-Specific Goal (Individualized)  Outcome: Progressing  Goal: Absence of Hospital-Acquired Illness or Injury  Outcome: Progressing  Goal: Optimal Comfort and Wellbeing  Outcome: Progressing  Goal: Readiness for Transition of Care  Outcome: Progressing     Problem: Infection  Goal: Absence of Infection Signs and Symptoms  Outcome: Progressing     Problem: Acute Kidney Injury/Impairment  Goal: Fluid and Electrolyte Balance  Outcome: Progressing  Goal: Improved Oral Intake  Outcome: Progressing  Goal: Effective Renal Function  Outcome: Progressing     Problem: Pneumonia  Goal: Fluid Balance  Outcome: Progressing  Goal: Resolution of Infection Signs and Symptoms  Outcome: Progressing  Goal: Effective Oxygenation and Ventilation  Outcome: Progressing

## 2024-10-26 NOTE — PROGRESS NOTES
[facility]  Hematology/Oncology  Progress Note    Patient Name: Casie Gaines  Admission Date: 10/22/2024  Hospital Length of Stay: 4 days  Code Status: Full Code     Subjective:   The patient location is:  Hospital room 545  Visit type: Virtual visit with synchronous audio and video  Face-to-face or time spent with patient on the encounter:  25 minute  Total time spent on and for  this encounter which includes non face-to-face time preparing to see patient, review of tests, obtaining and or reviewing separately obtained records documenting clinical information in the electronic or other health records, independently interpreting results which is not separately reported ,and communicating results to the patient/family/caregiver and in care coordination and treatment planning/communicating with pharmacy for prescriptions/addressing social needs/arranging follow-up and or referrals :  35 minutes    Each patient I provide medical services by telemedicine is:  (1) informed of the relationship between the physician and patient and the respective role of any other health care provider with respect to management of the patient; and (2) notified that he or she may decline to receive medical services by telemedicine and may withdraw from such care at any time.  This is a video visit therefore some elements of the physical exam such as vital signs, heart sounds are breath sounds are not included and may be included if found in recent clinic notes of other providers assessing same patient. Any symptoms or signs that were visualized were stated by the patient may be included in this note.       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  Hepatitis panel negative fully defined mass in left hepatic lobe measuring 8.8 cm noted on imaging studies  Oncology Treatment Plan:   OP GYN bevacizumab liposomal DOXOrubicin Q4W    Previous Treatment:    - 02/2019 - 07/2019: Carboplatin, paclitaxel and Avastin x 6 cycles  - 08/15/2019:  salpingo-oophorectomy, ureterolysis/Omentectomy with complete pathologic response  - 10/2019 - 9/2020 Avastin maintenance   - Carboplatin and Paclitaxel (09/29/2023 - 02/14/2024)     Current Treatment:  Doxorubicin and Bevacizumab (09/06/24 - )  Medications:  Continuous Infusions:  Scheduled Meds:   albumin human 25%  100 g Intravenous Once    electrolytes-dextrose  600 mL Oral Q4H    [START ON 10/27/2024] enoxparin  40 mg Subcutaneous Q24H (prophylaxis, 1700)    mupirocin   Nasal BID    sertraline  25 mg Oral Daily     PRN Meds:  Current Facility-Administered Medications:     albuterol sulfate, 2.5 mg, Nebulization, Q4H PRN    ALPRAZolam, 0.25 mg, Oral, TID PRN    alteplase, 2 mg, Intra-Catheter, PRN    dextrose 10%, 12.5 g, Intravenous, PRN    dextrose 10%, 25 g, Intravenous, PRN    fluticasone propionate, 1 spray, Each Nostril, Q12H PRN    glucagon (human recombinant), 1 mg, Intramuscular, PRN    glucose, 16 g, Oral, PRN    glucose, 24 g, Oral, PRN    hydrALAZINE, 10 mg, Intravenous, Q6H PRN    naloxone, 0.02 mg, Intravenous, PRN    ondansetron, 4 mg, Intravenous, Q6H PRN    oxyCODONE, 5 mg, Oral, Q6H PRN    prochlorperazine, 2.5 mg, Intravenous, Q6H PRN    sodium chloride 0.9%, 10 mL, Intravenous, Q12H PRN     Review of Systems  activity change, appetite change, chills, fever and unexpected weight change.   HENT:  Positive for congestion and trouble swallowing. Negative for nosebleeds.    Respiratory:  Negative for shortness of breath.    Gastrointestinal:  Positive for abdominal distention and nausea. Negative for constipation, diarrhea  and vomiting.   Musculoskeletal:  Negative for back pain.   Skin:  Negative for rash.   Neurological:  Positive for numbness. Negative for dizziness, weakness, light-headedness and headaches.   Hematological:  Does not bruise/bleed easily.   Psychiatric/Behavioral:  The patient is not nervous/anxious.       Objective:     Vital Signs (Most Recent):  Temp: 97.8 °F (36.6 °C) (10/26/24 1609)  Pulse: 91 (10/26/24 1609)  Resp: 20 (10/26/24 1609)  BP: (!) 115/56 (10/26/24 1609)  SpO2: (!) 90 % (10/26/24 1609) Vital Signs (24h Range):  Temp:  [97.8 °F (36.6 °C)-98.5 °F (36.9 °C)] 97.8 °F (36.6 °C)  Pulse:  [84-99] 91  Resp:  [18-20] 20  SpO2:  [90 %-100 %] 90 %  BP: ()/(53-61) 115/56     Weight: 104.6 kg (230 lb 9.6 oz)  Body mass index is 36.12 kg/m².  Body surface area is 2.22 meters squared.      Intake/Output Summary (Last 24 hours) at 10/26/2024 1743  Last data filed at 10/26/2024 0705  Gross per 24 hour   Intake 480 ml   Output 100 ml   Net 380 ml       Physical Exam  VITAL SIGNS:  as above   GENERAL: appears well-built, well-nourished.  No anxiety, no agitation, and in no distress.  Patient is awake, alert, oriented and cooperative.  HEENT:  Showed no congestion. Trachea is central no obvious icterus or pallor noted no hoarseness. no obvious JVD   NECK:  Supple.  No JVD. No obvious cervical submental or supraclavicular adenopathy.  RS:the visualized portion of  Chest expands well. chest appears symmetric, no audible wheezes.  No dyspnea recognized  ABDOMEN:  abdomen appears undistended.  EXTREMITIES:  Without edema.  NEUROLOGICAL:  The patient is appropriate, higher functions are normal.  No  obvious neurological deficits.  normal judgement normal thought content  No confusion, no speech impediment. Cranial nerves are intact and show no deficit. No gross motor deficits noted   SKIN MUSCULOSKELETAL: no joint or skeletal deformity, no clubbing of nails.  No visible rash ecchymosis or petechiae   Significant  Labs:   Lab Results   Component Value Date    WBC 4.13 10/26/2024    HGB 11.1 (L) 10/26/2024    HCT 35.4 (L) 10/26/2024    MCV 89 10/26/2024     10/26/2024      CMP  Sodium   Date Value Ref Range Status   10/26/2024 140 136 - 145 mmol/L Final     Potassium   Date Value Ref Range Status   10/26/2024 4.4 3.5 - 5.1 mmol/L Final     Chloride   Date Value Ref Range Status   10/26/2024 110 95 - 110 mmol/L Final     CO2   Date Value Ref Range Status   10/26/2024 23 23 - 29 mmol/L Final     Glucose   Date Value Ref Range Status   10/26/2024 77 70 - 110 mg/dL Final     BUN   Date Value Ref Range Status   10/26/2024 61 (H) 8 - 23 mg/dL Final     Creatinine   Date Value Ref Range Status   10/26/2024 1.7 (H) 0.5 - 1.4 mg/dL Final     Calcium   Date Value Ref Range Status   10/26/2024 8.8 8.7 - 10.5 mg/dL Final     Total Protein   Date Value Ref Range Status   10/26/2024 5.5 (L) 6.0 - 8.4 g/dL Final     Albumin   Date Value Ref Range Status   10/26/2024 2.3 (L) 3.5 - 5.2 g/dL Final     Total Bilirubin   Date Value Ref Range Status   10/26/2024 1.9 (H) 0.1 - 1.0 mg/dL Final     Comment:     For infants and newborns, interpretation of results should be based  on gestational age, weight and in agreement with clinical  observations.    Premature Infant recommended reference ranges:  Up to 24 hours.............<8.0 mg/dL  Up to 48 hours............<12.0 mg/dL  3-5 days..................<15.0 mg/dL  6-29 days.................<15.0 mg/dL       Alkaline Phosphatase   Date Value Ref Range Status   10/26/2024 965 (H) 40 - 150 U/L Final     AST   Date Value Ref Range Status   10/26/2024 744 (H) 10 - 40 U/L Final     ALT   Date Value Ref Range Status   10/26/2024 259 (H) 10 - 44 U/L Final     Anion Gap   Date Value Ref Range Status   10/26/2024 7 (L) 8 - 16 mmol/L Final     eGFR   Date Value Ref Range Status   10/26/2024 32 (A) >60 mL/min/1.73 m^2 Final        Diagnostic Results:  NM PET CT FDG Skull Base to Mid Thigh -  10/11/2024  Impression:  Overall there has been progression of disease.     -There is continued hypermetabolic adenopathy within the neck, chest (mediastinum and chest wall).  Some of these areas are stable with some nodes slightly diminished and some decreased degrees of FDG uptake within some of these nodes.     -However, there is extensive progression of peritoneal carcinomatosis throughout the abdomen and pelvis with increasing number and size of peritoneal hypermetabolic nodularity and increasing amount of ascites.  There is also large amount heterogeneous FDG uptake within the left hepatic lobe concerning for hepatic metastatic disease which is new.     -there has also been interval development moderate right-sided hydronephrosis and hydroureter with ureter likely being obstructed by peritoneal implants.     -increasing small to moderate bilateral loculated pleural effusions.  Extensive mixed interstitial and ground-glass infiltrates throughout the lungs.  Question pneumonia/post treatment related changes.  Lymphangitic carcinomatosis could appear similar        Electronically signed by:Andres Echeverria MD  Date:                                            10/11/2024  Time:                                           17:03    Assessment/Plan:     Active Diagnoses:    Diagnosis Date Noted POA    PRINCIPAL PROBLEM:  Acute renal failure superimposed on stage 3a chronic kidney disease [N17.9, N18.31] 10/22/2024 Yes    Moderate malnutrition [E44.0] 10/25/2024 Unknown    Nausea & vomiting [R11.2] 10/22/2024 Yes    Hypotension due to hypovolemia [E86.1] 10/22/2024 Yes    Elevated liver enzymes [R74.8] 10/22/2024 Yes    Chronic respiratory failure with hypoxia [J96.11] 10/22/2024 Yes    Chronic diastolic CHF (congestive heart failure) [I50.32] 10/22/2024 Yes    History of DVT (deep vein thrombosis) [Z86.718] 10/22/2024 Not Applicable    Normocytic anemia [D64.9] 10/22/2024 Yes    Transaminitis [R74.01] 10/03/2024 Yes    JERALD  (obstructive sleep apnea) [G47.33] 08/19/2024 Yes    Ovarian cancer, bilateral [C56.3] 08/15/2019 Yes    Peritoneal carcinomatosis [C78.6] 01/26/2019 Yes    Hyperlipidemia [E78.5] 02/01/2018 Yes      Problems Resolved During this Admission:   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  Patient started doxorubicin/Avastin September 6, 2024  Presented in Gyn/Onc tumor board 07/31/24 with consensus for FRa testing and proceed with mirvetuximab if positive and if negative proceed with Doxil   Hospice discussions have been taking place    AK IA on CKD patient being hydrated.  Patient has right-sided hydronephrosis and hydroureter with ureter likely being an obstructed by peritoneal Mets    Elevated LFTs: Probably secondary to dehydration metastatic disease continue to monitor    Hypotension.  Improving now holding all antihypertensives  Thank you for your consult.    Jen Fairbanks MD  Hematology/Oncology  [facility]

## 2024-10-26 NOTE — ASSESSMENT & PLAN NOTE
"Patient has Diastolic (HFpEF) heart failure that is Chronic. On presentation their CHF was well compensated. Most recent BNP and echo results are listed below.  No results for input(s): "BNP" in the last 72 hours.    Latest ECHO  Results for orders placed during the hospital encounter of 09/03/24    Echo    Interpretation Summary    Left Ventricle: The left ventricle is normal in size. Normal wall thickness. There is normal systolic function with a visually estimated ejection fraction of 60 - 65%. Biplane (2D) method of discs ejection fraction is 56%. Global longitudinal strain is -19.5%. There is normal diastolic function.    Right Ventricle: Normal right ventricular cavity size. Wall thickness is normal. Systolic function is normal.    IVC/SVC: Normal venous pressure at 3 mmHg.    Malignant neoplasm of both ovaries    Baseline echo 9/3/2024  NORM -19.47%  Biplane 56%    Current Heart Failure Medications  hydrALAZINE injection 10 mg, Every 6 hours PRN, Intravenous    Plan  - Monitor strict I&Os and daily weights.    - Place on telemetry  - Cardiology has not been consulted  - The patient's volume status is at their baseline  "

## 2024-10-26 NOTE — PLAN OF CARE
Patient remains free of falls and injuries. No signs of pain or discomfort noted. Telemonitoring in place. Call light within reach. Chart check complete. Plan of care ongoing.     Problem: Adult Inpatient Plan of Care  Goal: Plan of Care Review  Outcome: Progressing     Problem: Adult Inpatient Plan of Care  Goal: Patient-Specific Goal (Individualized)  Outcome: Progressing     Problem: Adult Inpatient Plan of Care  Goal: Absence of Hospital-Acquired Illness or Injury  Outcome: Progressing

## 2024-10-26 NOTE — ASSESSMENT & PLAN NOTE
Anemia is likely due to chronic disease due to Malignancy and chronic kidney disease. Most recent hemoglobin and hematocrit are listed below.  Recent Labs     10/23/24  0411 10/24/24  0951 10/25/24  0849   HGB 10.6* 12.0 11.9*   HCT 33.8* 38.0 39.5       Plan  - Monitor serial CBC: Daily  - Transfuse PRBC if patient becomes hemodynamically unstable, symptomatic or H/H drops below 7/21.  - Patient has not received any PRBC transfusions to date  - Patient's anemia is currently stable

## 2024-10-26 NOTE — ASSESSMENT & PLAN NOTE
Recent Labs     10/23/24  0411 10/24/24  0951 10/25/24  0849   * 772* 787*   * 266* 273*           10/24/24 Discussion with hepatology, Interventional Radiology, Heme-Onc regarding worsened liver function.  Concerns for progression of metastatic disease, goals of care discussions ongoing.

## 2024-10-26 NOTE — PROGRESS NOTES
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 Pugh from clinic for hypotension and abnormal labs with acute kidney injury and worsening liver enzymes.  Symptoms were acute onset, moderate severity, and with blood pressure improved with albumin administration given in the emergency department.  Patient denies any associated chest pain, increased shortness of breath, cough, fevers, chills.  She does complain of recurrent episodes of nausea with vomiting.  She has chronic abdominal pain but has noted increasing distention to her abdomen as well as increasing lower extremity swelling.  She denies any diarrhea, constipation, dysuria, or hematuria.  Patient is actively receiving chemotherapy with last dose received 10/15/2024.    Last hospital admit 10/2-10/07/2024 for shortness for breath, new bilateral pleural effusion, pneumonia, and images with worsening metastatic disease.  Patient received IV cefepime.    Overview/Hospital Course:  Admitted to Hospital Medicine for evaluation of constitutional symptoms and labs concerning for MIRELA in setting of poor oral intake.   "Started on increased fluid hydration.  Heme-Onc consulted and after goals of care discussion, plan to continue with treatment at this time.  Evaluation of lower extremity pain/swelling without concerns for DVT.  Evaluation of transaminitis with hepatitis panel negative, and abdominal ultrasound noting previously seen "severely heterogeneous echotexture throughout the liver with poorly defined mass within the left hepatic lobe measuring up to 8.8 cm. Additional smaller hypoechoic lesions seen throughout concerning for metastatic disease. " Negative for gallstones, common bile duct dilation, or Back's sign. Evaluation of transaminitis by Heme-Onc noted likely secondary to hypovolemia with concerns if no improvement could be secondary to advancement of malignancy but less likely given recent chemotherapy resumption. Abdominal ultrasound noted concerns for "Mild to moderate right-sided hydronephrosis", Urology evaluation noted kidney obstruction likely secondary from progression of cancer and after discussion with the patient, patient opted for conservative management at the time.  Discussion with hepatology, Interventional Radiology, Heme-Onc regarding worsened liver function.  Concerns for progression of metastatic disease, goals of care discussions ongoing.    Interval History: No acute events overnight, afebrile, hemodynamically stable.  Patient still reports poor appetite, but has been trying to drink increased fluids, with some improvement in renal function.  Still reports lower extremity swelling. Concerns for progression of metastatic disease, Heme-Onc following, goals of care discussions ongoing.       Objective:     Vital Signs (Most Recent):  Temp: 98.3 °F (36.8 °C) (10/25/24 1624)  Pulse: 91 (10/25/24 1717)  Resp: 18 (10/25/24 1624)  BP: 110/61 (10/25/24 1624)  SpO2: (!) 87 % (10/25/24 1624) Vital Signs (24h Range):  Temp:  [97.4 °F (36.3 °C)-98.7 °F (37.1 °C)] 98.3 °F (36.8 °C)  Pulse:  [] 91  Resp: "  [16-18] 18  SpO2:  [87 %-97 %] 87 %  BP: ()/(51-61) 110/61     Weight: 104.6 kg (230 lb 9.6 oz)  Body mass index is 36.12 kg/m².  No intake or output data in the 24 hours ending 10/25/24 1900        Physical Exam  Vitals and nursing note reviewed.   Constitutional:       General: She is not in acute distress.     Appearance: She is obese. She is ill-appearing. She is not toxic-appearing or diaphoretic.   HENT:      Head: Normocephalic and atraumatic.      Mouth/Throat:      Mouth: Mucous membranes are moist.   Eyes:      General: No scleral icterus.        Right eye: No discharge.         Left eye: No discharge.   Cardiovascular:      Rate and Rhythm: Normal rate and regular rhythm.      Heart sounds: Normal heart sounds.   Pulmonary:      Effort: Pulmonary effort is normal. No respiratory distress.      Breath sounds: Normal breath sounds.   Abdominal:      General: Bowel sounds are normal.      Palpations: Abdomen is soft.   Musculoskeletal:         General: Swelling present.      Cervical back: No rigidity.      Right lower leg: Edema present.      Left lower leg: Edema present.   Skin:     General: Skin is warm and dry.      Coloration: Skin is not jaundiced.   Neurological:      Mental Status: She is alert and oriented to person, place, and time. Mental status is at baseline.   Psychiatric:         Mood and Affect: Mood normal.         Behavior: Behavior normal.             Significant Labs: All pertinent labs within the past 24 hours have been reviewed.  LABS:  Recent Labs   Lab 10/23/24  0411 10/24/24  0951 10/25/24  0849    143 142   K 4.6 4.4 4.2    110 110   CO2 20* 22* 21*   BUN 82* 74* 69*   CREATININE 2.3* 1.9* 1.7*   GLU 77 82 89   ANIONGAP 11 11 11     Recent Labs   Lab 10/22/24  1015 10/22/24  1829 10/24/24  0951 10/25/24  0849   MG 2.5 2.4 2.6 2.5   PHOS 4.5  --  3.3 3.3     Recent Labs   Lab 10/23/24  0411 10/24/24  0951 10/25/24  0849   * 772* 787*   * 266* 273*    ALKPHOS 937* 1,072* 1,117*   BILITOT 1.3* 1.7* 1.9*   ALBUMIN 2.7* 2.7* 2.5*     Recent Labs   Lab 10/23/24  0411 10/24/24  0951 10/25/24  0849   WBC 3.56* 4.39 4.37   HGB 10.6* 12.0 11.9*   HCT 33.8* 38.0 39.5    239 227   GRAN 67.4  2.4 77.9*  3.4 77.7*  3.4             Significant Imaging: I have reviewed all pertinent imaging results/findings within the past 24 hours.  US Lower Extremity Veins Bilateral   Final Result      No evidence of deep venous thrombosis in either lower extremity.         Electronically signed by: Lauren Barron   Date:    10/23/2024   Time:    20:57      US Abdomen Limited   Final Result      Severe heterogeneous echotexture throughout the liver with metastatic disease as above.         Electronically signed by: Chandrakant Fermin MD   Date:    10/23/2024   Time:    08:32      X-Ray Chest AP Portable   Final Result      1.  Similar degree of vascular congestion versus reticular interstitial changes throughout the lungs with patchy ground-glass opacities.  Stable small to moderate left effusion.  New small right effusion.  Differential considerations would include chronic or recurrent CHF versus interstitial infectious process such as atypical viral pneumonia.  Clinical correlation is advised.      2.  Stable findings as noted above.         Electronically signed by: Partha Miguel MD   Date:    10/22/2024   Time:    17:30          Inpatient Medications:  Continuous Infusions:  Scheduled Meds:   electrolytes-dextrose  600 mL Oral Q4H    enoxparin  1 mg/kg Subcutaneous Q12H (treatment, non-standard time)    mupirocin   Nasal BID    sertraline  25 mg Oral Daily     PRN Meds:  Current Facility-Administered Medications:     albuterol sulfate, 2.5 mg, Nebulization, Q4H PRN    ALPRAZolam, 0.25 mg, Oral, TID PRN    alteplase, 2 mg, Intra-Catheter, PRN    dextrose 10%, 12.5 g, Intravenous, PRN    dextrose 10%, 25 g, Intravenous, PRN    fluticasone propionate, 1 spray, Each Nostril, Q12H  PRN    glucagon (human recombinant), 1 mg, Intramuscular, PRN    glucose, 16 g, Oral, PRN    glucose, 24 g, Oral, PRN    hydrALAZINE, 10 mg, Intravenous, Q6H PRN    naloxone, 0.02 mg, Intravenous, PRN    ondansetron, 4 mg, Intravenous, Q6H PRN    oxyCODONE, 5 mg, Oral, Q6H PRN    prochlorperazine, 2.5 mg, Intravenous, Q6H PRN    sodium chloride 0.9%, 10 mL, Intravenous, Q12H PRN      Assessment/Plan:      * Acute renal failure superimposed on stage 3a chronic kidney disease  MIRELA is likely due to pre-renal azotemia due to dehydration related to nausea with vomiting, hypotension, inadequate oral intake, and likely also related to progressive metastatic disease. Baseline creatinine is  1.1-1.3 . Most recent creatinine and eGFR are listed below.  Recent Labs     10/23/24  0411 10/24/24  0951 10/25/24  0849   CREATININE 2.3* 1.9* 1.7*   EGFRNORACEVR 22* 28* 32*        Plan  - MIRELA is improving--> patient received albumin 25% 25 g IV x1 dose in the emergency department. Patient is in need of IV fluids for the treatment of MIRELA.  Due to a shortage of IV fluids, patient to receive oral fluids.  Patient must receive this treatment in a hospital location due to the risk of adverse effects, insufficient response to treatment, or other potential complications of disease. We will encourage increased fluid intake, Pedialyte 600 mL p.o. q.4 hours ordered  - Avoid nephrotoxins and renally dose meds for GFR listed above  - Monitor urine output, serial BMP, and adjust therapy as needed  - Urology evaluation of hydronephrosis on imaging noted kidney obstruction likely secondary from progression of cancer and after discussion with the patient, patient opted for conservative management at the time.      Moderate malnutrition  Nutrition consulted. Most recent weight and BMI monitored-     Measurements:  Wt Readings from Last 1 Encounters:   10/24/24 104.6 kg (230 lb 9.6 oz)   Body mass index is 36.12 kg/m².    Patient has been screened and  "assessed by RD.    Malnutrition Type:  Context: chronic illness  Level: moderate    Malnutrition Characteristic Summary:  Weight Loss (Malnutrition): 10% in 6 months  Energy Intake (Malnutrition): less than or equal to 75% for greater than or equal to 1 month  Fluid Accumulation (Malnutrition): mild    Interventions/Recommendations (treatment strategy):  1. General Healthful Diet  2. Commercial beverage medical food supplement therapy 3. Collaboration by nutrition professional with other providers      Normocytic anemia  Anemia is likely due to chronic disease due to Malignancy and chronic kidney disease. Most recent hemoglobin and hematocrit are listed below.  Recent Labs     10/23/24  0411 10/24/24  0951 10/25/24  0849   HGB 10.6* 12.0 11.9*   HCT 33.8* 38.0 39.5       Plan  - Monitor serial CBC: Daily  - Transfuse PRBC if patient becomes hemodynamically unstable, symptomatic or H/H drops below 7/21.  - Patient has not received any PRBC transfusions to date  - Patient's anemia is currently stable      History of DVT (deep vein thrombosis)  -recent left lower extremity venous ultrasound 10/15/2024 was negative for DVT  -bilateral lower extremity edema most likely related to metastatic disease and hypoalbuminemia      PLAN:  -Repeat ultrasound negative for DVT  -treatment dose Lovenox (renally dosed) for now    Chronic diastolic CHF (congestive heart failure)  Patient has Diastolic (HFpEF) heart failure that is Chronic. On presentation their CHF was well compensated. Most recent BNP and echo results are listed below.  No results for input(s): "BNP" in the last 72 hours.    Latest ECHO  Results for orders placed during the hospital encounter of 09/03/24    Echo    Interpretation Summary    Left Ventricle: The left ventricle is normal in size. Normal wall thickness. There is normal systolic function with a visually estimated ejection fraction of 60 - 65%. Biplane (2D) method of discs ejection fraction is 56%. Global " "longitudinal strain is -19.5%. There is normal diastolic function.    Right Ventricle: Normal right ventricular cavity size. Wall thickness is normal. Systolic function is normal.    IVC/SVC: Normal venous pressure at 3 mmHg.    Malignant neoplasm of both ovaries    Baseline echo 9/3/2024  NORM -19.47%  Biplane 56%    Current Heart Failure Medications  hydrALAZINE injection 10 mg, Every 6 hours PRN, Intravenous    Plan  - Monitor strict I&Os and daily weights.    - Place on telemetry  - Cardiology has not been consulted  - The patient's volume status is at their baseline    Chronic respiratory failure with hypoxia  Patient with Hypoxic Respiratory failure which is Chronic.  she is on home oxygen at 3-4 LPM. Supplemental oxygen was provided and noted- Oxygen Concentration (%):  [36] 36    -CXR this admit with " Similar degree of vascular congestion versus reticular interstitial changes throughout the lungs with patchy ground-glass opacities.  Stable small to moderate left effusion.  New small right effusion.  Differential considerations would include chronic or recurrent CHF versus interstitial infectious process such as atypical viral pneumonia.  Clinical correlation is advised."  -recent CTA of chest 10/02/2024 was reviewed  -initial labs notable for white blood cell count 4.10, lactic acid level 1.0, BNP 27, afebrile  -procalcitonin level elevated, however utility limited given MIRELA    -no antibiotics warranted at this time  -no diuretics warranted at this time  -continuous pulse oximetry monitoring, O2 supplementation, incentive spirometry, and p.r.n. albuterol inhaler    Elevated liver enzymes  Upon admission, alk phos 1018, total bilirubin 1.2, , , INR 1.2, BNP 27, lactic acid level 1.0, creatinine 2.4  -Evaluation of transaminitis with hepatitis panel negative, and abdominal ultrasound noting previously seen "severely heterogeneous echotexture throughout the liver with poorly defined mass within " "the left hepatic lobe measuring up to 8.8 cm. Additional smaller hypoechoic lesions seen throughout concerning for metastatic disease. " Negative for gallstones, common bile duct dilation, or Back's sign.   -Evaluation of transaminitis by Heme-Onc noted likely secondary to hypovolemia with concerns if no improvement could be secondary to advancement of malignancy but less likely given recent chemotherapy resumption.      -hold Crestor and avoid hepatotoxic agents  -recent CT scan of abdomen/pelvis and PET scan were reviewed  -10/24/24 Discussion with hepatology, Interventional Radiology, Heme-Onc regarding worsened liver function.  Concerns for progression of metastatic disease, goals of care discussions ongoing.       Hypotension due to hypovolemia  -hypotension improved with IV albumin given in the emergency department  -hold Norvasc, atenolol, Benicar/HCT  -limit sedating agents      Nausea & vomiting  -bland diet, Pedialyte scheduled, IV albumin x1 dose  -p.r.n. Antiemetics        Transaminitis  Recent Labs     10/23/24  0411 10/24/24  0951 10/25/24  0849   * 772* 787*   * 266* 273*           10/24/24 Discussion with hepatology, Interventional Radiology, Heme-Onc regarding worsened liver function.  Concerns for progression of metastatic disease, goals of care discussions ongoing.       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 New moderate-sized bilateral pleural effusions as described above with adjacent atelectasis or consolidation.No enlarged mediastinal and hilar lymph nodes. Intralobular septal thickening and associated groundglass opacity bilaterally felt to represent edema.New irregular soft tissue mass in the left upper " "quadrant concerning for carcinomatosis."  -CT scan of abdomen and pelvis 10/06/2024 with Widespread metastatic disease suspected with abnormal lymph nodes within the retroperitoneum, jovan hepatis, mediastinum as well as multiple abnormal liver lesions and focal thickening within the large bowel. Patient needs tissue sampling of liver lesion. Recommend colonoscopy and possible biopsy of sigmoid lesion."  -nuclear medicine PET scan 10/11/2024 with Overall there has been progression of disease. There is continued hypermetabolic adenopathy within the neck, chest (mediastinum and chest wall).  Some of these areas are stable with some nodes slightly diminished and some decreased degrees of FDG uptake within some of these nodes. However, there is extensive progression of peritoneal carcinomatosis throughout the abdomen and pelvis with increasing number and size of peritoneal hypermetabolic nodularity and increasing amount of ascites.  There is also large amount heterogeneous FDG uptake within the left hepatic lobe concerning for hepatic metastatic disease which is new. there has also been interval development moderate right-sided hydronephrosis and hydroureter with ureter likely being obstructed by peritoneal implants. increasing small to moderate bilateral loculated pleural effusions.  Extensive mixed interstitial and ground-glass infiltrates throughout the lungs.  Question pneumonia/post treatment related changes.  Lymphangitic carcinomatosis could appear similar"      -Heme-Onc consulted and after goals of care discussion, plan to continue with treatment at this time.  Follow up recs.   - Discussion with hepatology, Interventional Radiology, Heme-Onc regarding worsened liver function.  Concerns for progression of metastatic disease, goals of care discussions ongoing.   -p.r.n. pain control            Peritoneal carcinomatosis  See discussion for bilateral ovarian cancer    Hyperlipidemia  -hold Crestor in the setting " of worsening LFTs          VTE Risk Mitigation (From admission, onward)           Ordered     enoxaparin injection 105 mg  Every 12 hours         10/24/24 1139     IP VTE HIGH RISK PATIENT  Once         10/22/24 2032     Place sequential compression device  Until discontinued         10/22/24 2032                    Discharge Planning   EVE:      Code Status: Full Code   Is the patient medically ready for discharge?:     Reason for patient still in hospital (select all that apply): Patient trending condition, Laboratory test, and Treatment  Discharge Plan A: Home with family                  Anival Boris Hernandez DO  Department of Hospital Medicine   '[facility]    Voice recognition software was used in the creation of this note/communication and any sound-alike/typographical errors which may have occurred despite initial review prior to signing should be taken in context when interpreting.  If such errors prevent a clear understanding of the note/communication, please contact the provider/office for clarification.

## 2024-10-26 NOTE — ASSESSMENT & PLAN NOTE
Nutrition consulted. Most recent weight and BMI monitored-     Measurements:  Wt Readings from Last 1 Encounters:   10/24/24 104.6 kg (230 lb 9.6 oz)   Body mass index is 36.12 kg/m².    Patient has been screened and assessed by RD.    Malnutrition Type:  Context: chronic illness  Level: moderate    Malnutrition Characteristic Summary:  Weight Loss (Malnutrition): 10% in 6 months  Energy Intake (Malnutrition): less than or equal to 75% for greater than or equal to 1 month  Fluid Accumulation (Malnutrition): mild    Interventions/Recommendations (treatment strategy):  1. General Healthful Diet  2. Commercial beverage medical food supplement therapy 3. Collaboration by nutrition professional with other providers

## 2024-10-27 LAB
ALBUMIN SERPL BCP-MCNC: 3.7 G/DL (ref 3.5–5.2)
ALP SERPL-CCNC: 859 U/L (ref 40–150)
ALT SERPL W/O P-5'-P-CCNC: 255 U/L (ref 10–44)
ANION GAP SERPL CALC-SCNC: 13 MMOL/L (ref 8–16)
AST SERPL-CCNC: 765 U/L (ref 10–40)
BASOPHILS # BLD AUTO: 0.03 K/UL (ref 0–0.2)
BASOPHILS NFR BLD: 0.9 % (ref 0–1.9)
BILIRUB SERPL-MCNC: 2.8 MG/DL (ref 0.1–1)
BUN SERPL-MCNC: 64 MG/DL (ref 8–23)
CALCIUM SERPL-MCNC: 9.3 MG/DL (ref 8.7–10.5)
CHLORIDE SERPL-SCNC: 108 MMOL/L (ref 95–110)
CO2 SERPL-SCNC: 20 MMOL/L (ref 23–29)
CREAT SERPL-MCNC: 2 MG/DL (ref 0.5–1.4)
DIFFERENTIAL METHOD BLD: ABNORMAL
EOSINOPHIL # BLD AUTO: 0 K/UL (ref 0–0.5)
EOSINOPHIL NFR BLD: 1.1 % (ref 0–8)
ERYTHROCYTE [DISTWIDTH] IN BLOOD BY AUTOMATED COUNT: 19.8 % (ref 11.5–14.5)
EST. GFR  (NO RACE VARIABLE): 27 ML/MIN/1.73 M^2
GLUCOSE SERPL-MCNC: 84 MG/DL (ref 70–110)
HCT VFR BLD AUTO: 32 % (ref 37–48.5)
HGB BLD-MCNC: 10.1 G/DL (ref 12–16)
IMM GRANULOCYTES # BLD AUTO: 0.01 K/UL (ref 0–0.04)
IMM GRANULOCYTES NFR BLD AUTO: 0.3 % (ref 0–0.5)
LYMPHOCYTES # BLD AUTO: 0.6 K/UL (ref 1–4.8)
LYMPHOCYTES NFR BLD: 18 % (ref 18–48)
MAGNESIUM SERPL-MCNC: 2.7 MG/DL (ref 1.6–2.6)
MCH RBC QN AUTO: 28 PG (ref 27–31)
MCHC RBC AUTO-ENTMCNC: 31.6 G/DL (ref 32–36)
MCV RBC AUTO: 89 FL (ref 82–98)
MONOCYTES # BLD AUTO: 0.2 K/UL (ref 0.3–1)
MONOCYTES NFR BLD: 4.3 % (ref 4–15)
NEUTROPHILS # BLD AUTO: 2.6 K/UL (ref 1.8–7.7)
NEUTROPHILS NFR BLD: 75.4 % (ref 38–73)
NRBC BLD-RTO: 0 /100 WBC
PHOSPHATE SERPL-MCNC: 3.5 MG/DL (ref 2.7–4.5)
PLATELET # BLD AUTO: 164 K/UL (ref 150–450)
PMV BLD AUTO: 10.5 FL (ref 9.2–12.9)
POTASSIUM SERPL-SCNC: 4.2 MMOL/L (ref 3.5–5.1)
PROT SERPL-MCNC: 6.2 G/DL (ref 6–8.4)
RBC # BLD AUTO: 3.61 M/UL (ref 4–5.4)
SODIUM SERPL-SCNC: 141 MMOL/L (ref 136–145)
WBC # BLD AUTO: 3.5 K/UL (ref 3.9–12.7)

## 2024-10-27 PROCEDURE — P9047 ALBUMIN (HUMAN), 25%, 50ML: HCPCS | Mod: JZ,JG,HCNC | Performed by: STUDENT IN AN ORGANIZED HEALTH CARE EDUCATION/TRAINING PROGRAM

## 2024-10-27 PROCEDURE — 99900035 HC TECH TIME PER 15 MIN (STAT): Mod: HCNC

## 2024-10-27 PROCEDURE — 84100 ASSAY OF PHOSPHORUS: CPT | Mod: HCNC | Performed by: STUDENT IN AN ORGANIZED HEALTH CARE EDUCATION/TRAINING PROGRAM

## 2024-10-27 PROCEDURE — 85025 COMPLETE CBC W/AUTO DIFF WBC: CPT | Mod: HCNC | Performed by: STUDENT IN AN ORGANIZED HEALTH CARE EDUCATION/TRAINING PROGRAM

## 2024-10-27 PROCEDURE — 25000003 PHARM REV CODE 250: Mod: HCNC | Performed by: INTERNAL MEDICINE

## 2024-10-27 PROCEDURE — 63600175 PHARM REV CODE 636 W HCPCS: Mod: HCNC | Performed by: STUDENT IN AN ORGANIZED HEALTH CARE EDUCATION/TRAINING PROGRAM

## 2024-10-27 PROCEDURE — 80053 COMPREHEN METABOLIC PANEL: CPT | Mod: HCNC | Performed by: STUDENT IN AN ORGANIZED HEALTH CARE EDUCATION/TRAINING PROGRAM

## 2024-10-27 PROCEDURE — 21400001 HC TELEMETRY ROOM: Mod: HCNC

## 2024-10-27 PROCEDURE — 83735 ASSAY OF MAGNESIUM: CPT | Mod: HCNC | Performed by: STUDENT IN AN ORGANIZED HEALTH CARE EDUCATION/TRAINING PROGRAM

## 2024-10-27 PROCEDURE — 25000003 PHARM REV CODE 250: Mod: HCNC | Performed by: STUDENT IN AN ORGANIZED HEALTH CARE EDUCATION/TRAINING PROGRAM

## 2024-10-27 PROCEDURE — 63600175 PHARM REV CODE 636 W HCPCS: Mod: HCNC | Performed by: INTERNAL MEDICINE

## 2024-10-27 PROCEDURE — 94761 N-INVAS EAR/PLS OXIMETRY MLT: CPT | Mod: HCNC

## 2024-10-27 PROCEDURE — 27000221 HC OXYGEN, UP TO 24 HOURS: Mod: HCNC

## 2024-10-27 RX ORDER — ALBUMIN HUMAN 250 G/1000ML
12.5 SOLUTION INTRAVENOUS ONCE
Status: COMPLETED | OUTPATIENT
Start: 2024-10-27 | End: 2024-10-27

## 2024-10-27 RX ADMIN — PROCHLORPERAZINE EDISYLATE 2.5 MG: 5 INJECTION INTRAMUSCULAR; INTRAVENOUS at 10:10

## 2024-10-27 RX ADMIN — ONDANSETRON 4 MG: 2 INJECTION INTRAMUSCULAR; INTRAVENOUS at 06:10

## 2024-10-27 RX ADMIN — SERTRALINE HYDROCHLORIDE 25 MG: 25 TABLET ORAL at 09:10

## 2024-10-27 RX ADMIN — ALPRAZOLAM 0.25 MG: 0.25 TABLET ORAL at 02:10

## 2024-10-27 RX ADMIN — MUPIROCIN: 20 OINTMENT TOPICAL at 09:10

## 2024-10-27 RX ADMIN — ALBUMIN (HUMAN) 12.5 G: 12.5 SOLUTION INTRAVENOUS at 02:10

## 2024-10-27 RX ADMIN — ONDANSETRON 4 MG: 2 INJECTION INTRAMUSCULAR; INTRAVENOUS at 12:10

## 2024-10-27 RX ADMIN — Medication 600 ML: at 09:10

## 2024-10-27 RX ADMIN — ENOXAPARIN SODIUM 40 MG: 40 INJECTION SUBCUTANEOUS at 04:10

## 2024-10-27 NOTE — ASSESSMENT & PLAN NOTE
MIRELA is likely due to pre-renal azotemia due to dehydration related to nausea with vomiting, hypotension, inadequate oral intake, and likely also related to progressive metastatic disease. Baseline creatinine is  1.1-1.3 . Most recent creatinine and eGFR are listed below.  Recent Labs     10/24/24  0951 10/25/24  0849 10/26/24  0540   CREATININE 1.9* 1.7* 1.7*   EGFRNORACEVR 28* 32* 32*      Plan  - MIRELA is improving--> patient received albumin 25% 25 g IV x1 dose in the emergency department. Patient is in need of IV fluids for the treatment of MIRELA.  Due to a shortage of IV fluids, patient to receive oral fluids.  Patient must receive this treatment in a hospital location due to the risk of adverse effects, insufficient response to treatment, or other potential complications of disease. We will encourage increased fluid intake, Pedialyte 600 mL p.o. q.4 hours ordered. 10/26- albumin 25% 100g IV x1 administered  - Avoid nephrotoxins and renally dose meds for GFR listed above  - Monitor urine output, serial BMP, and adjust therapy as needed  - Urology evaluation of hydronephrosis on imaging noted kidney obstruction likely secondary from progression of cancer and after discussion with the patient, patient opted for conservative management at the time.

## 2024-10-27 NOTE — ASSESSMENT & PLAN NOTE
"Upon admission, alk phos 1018, total bilirubin 1.2, , , INR 1.2, BNP 27, lactic acid level 1.0, creatinine 2.4  -Evaluation of transaminitis with hepatitis panel negative, and abdominal ultrasound noting previously seen "severely heterogeneous echotexture throughout the liver with poorly defined mass within the left hepatic lobe measuring up to 8.8 cm. Additional smaller hypoechoic lesions seen throughout concerning for metastatic disease. " Negative for gallstones, common bile duct dilation, or Back's sign.     Recent Labs     10/25/24  0849 10/26/24  0540 10/27/24  0622   * 744* 765*   * 259* 255*       -Evaluation of transaminitis by Heme-Onc noted likely secondary to hypovolemia with concerns if no improvement could be secondary to advancement of malignancy but less likely given recent chemotherapy resumption.      -hold Crestor and avoid hepatotoxic agents  -recent CT scan of abdomen/pelvis and PET scan were reviewed  -10/24/24 Discussion with hepatology, Interventional Radiology, Heme-Onc regarding worsened liver function.  Concerns for progression of metastatic disease, goals of care discussions ongoing.     "

## 2024-10-27 NOTE — ASSESSMENT & PLAN NOTE
Recent Labs     10/24/24  0951 10/25/24  0849 10/26/24  0540   * 787* 744*   * 273* 259*           10/24/24 Discussion with hepatology, Interventional Radiology, Heme-Onc regarding worsened liver function.  Concerns for progression of metastatic disease, goals of care discussions ongoing.

## 2024-10-27 NOTE — ASSESSMENT & PLAN NOTE
Anemia is likely due to chronic disease due to Malignancy and chronic kidney disease. Most recent hemoglobin and hematocrit are listed below.  Recent Labs     10/24/24  0951 10/25/24  0849 10/26/24  0540   HGB 12.0 11.9* 11.1*   HCT 38.0 39.5 35.4*       Plan  - Monitor serial CBC: Daily  - Transfuse PRBC if patient becomes hemodynamically unstable, symptomatic or H/H drops below 7/21.  - Patient has not received any PRBC transfusions to date  - Patient's anemia is currently stable

## 2024-10-27 NOTE — ASSESSMENT & PLAN NOTE
"Patient with Hypoxic Respiratory failure which is Chronic.  she is on home oxygen at 3-4 LPM.     -CXR this admit with " Similar degree of vascular congestion versus reticular interstitial changes throughout the lungs with patchy ground-glass opacities.  Stable small to moderate left effusion.  New small right effusion.  Differential considerations would include chronic or recurrent CHF versus interstitial infectious process such as atypical viral pneumonia.  Clinical correlation is advised."  -recent CTA of chest 10/02/2024 was reviewed  -initial labs notable for white blood cell count 4.10, lactic acid level 1.0, BNP 27, afebrile  -procalcitonin level elevated, however utility limited given MIRELA    -no antibiotics warranted at this time  -no diuretics warranted at this time  -continuous pulse oximetry monitoring, O2 supplementation, incentive spirometry, and p.r.n. albuterol inhaler  "

## 2024-10-27 NOTE — ASSESSMENT & PLAN NOTE
Recent Labs     10/25/24  0849 10/26/24  0540 10/27/24  0622   * 744* 765*   * 259* 255*           10/24/24 Discussion with hepatology, Interventional Radiology, Heme-Onc regarding worsened liver function.  Concerns for progression of metastatic disease, goals of care discussions ongoing.

## 2024-10-27 NOTE — ASSESSMENT & PLAN NOTE
Anemia is likely due to chronic disease due to Malignancy and chronic kidney disease. Most recent hemoglobin and hematocrit are listed below.  Recent Labs     10/25/24  0849 10/26/24  0540 10/27/24  0622   HGB 11.9* 11.1* 10.1*   HCT 39.5 35.4* 32.0*       Plan  - Monitor serial CBC: Daily  - Transfuse PRBC if patient becomes hemodynamically unstable, symptomatic or H/H drops below 7/21.  - Patient has not received any PRBC transfusions to date  - Patient's anemia is currently stable

## 2024-10-27 NOTE — PLAN OF CARE
Discussed poc with pt, pt verbalized understanding  Purposeful rounding every 2hours  VS wnl  Cardiac monitoring in use, pt is NSR, tele monitor # [id number]____  Fall precautions in place, remains injury free                                                nausea under control with PRN meds  Bed locked at lowest position  Call light within reach   Chart check complete  Will cont with POC

## 2024-10-27 NOTE — ASSESSMENT & PLAN NOTE
Patient has Diastolic (HFpEF) heart failure that is Chronic. On presentation their CHF was well compensated. Most recent BNP and echo results are listed below.      Latest ECHO  Results for orders placed during the hospital encounter of 09/03/24    Echo    Interpretation Summary    Left Ventricle: The left ventricle is normal in size. Normal wall thickness. There is normal systolic function with a visually estimated ejection fraction of 60 - 65%. Biplane (2D) method of discs ejection fraction is 56%. Global longitudinal strain is -19.5%. There is normal diastolic function.    Right Ventricle: Normal right ventricular cavity size. Wall thickness is normal. Systolic function is normal.    IVC/SVC: Normal venous pressure at 3 mmHg.    Malignant neoplasm of both ovaries    Baseline echo 9/3/2024  NORM -19.47%  Biplane 56%    Current Heart Failure Medications  hydrALAZINE injection 10 mg, Every 6 hours PRN, Intravenous    Plan  - Monitor strict I&Os and daily weights.    - Place on telemetry  - Cardiology has not been consulted  - suspect volume overload likely secondary to 3rd spacing in the setting of failure and hepatic failure

## 2024-10-27 NOTE — PROGRESS NOTES
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 Pugh from clinic for hypotension and abnormal labs with acute kidney injury and worsening liver enzymes.  Symptoms were acute onset, moderate severity, and with blood pressure improved with albumin administration given in the emergency department.  Patient denies any associated chest pain, increased shortness of breath, cough, fevers, chills.  She does complain of recurrent episodes of nausea with vomiting.  She has chronic abdominal pain but has noted increasing distention to her abdomen as well as increasing lower extremity swelling.  She denies any diarrhea, constipation, dysuria, or hematuria.  Patient is actively receiving chemotherapy with last dose received 10/15/2024.    Last hospital admit 10/2-10/07/2024 for shortness for breath, new bilateral pleural effusion, pneumonia, and images with worsening metastatic disease.  Patient received IV cefepime.    Overview/Hospital Course:  Admitted to Hospital Medicine for evaluation of constitutional symptoms and labs concerning for MIRELA in setting of poor oral intake.   "Started on increased fluid hydration.  Heme-Onc consulted and after goals of care discussion, plan to continue with treatment at this time.  Evaluation of lower extremity pain/swelling without concerns for DVT.  Evaluation of transaminitis with hepatitis panel negative, and abdominal ultrasound noting previously seen "severely heterogeneous echotexture throughout the liver with poorly defined mass within the left hepatic lobe measuring up to 8.8 cm. Additional smaller hypoechoic lesions seen throughout concerning for metastatic disease. " Negative for gallstones, common bile duct dilation, or Back's sign. Evaluation of transaminitis by Heme-Onc noted likely secondary to hypovolemia with concerns if no improvement could be secondary to advancement of malignancy but less likely given recent chemotherapy resumption. Abdominal ultrasound noted concerns for "Mild to moderate right-sided hydronephrosis", Urology evaluation noted kidney obstruction likely secondary from progression of cancer and after discussion with the patient, patient opted for conservative management at the time.  Discussion with hepatology, Interventional Radiology, Heme-Onc regarding worsened liver function.  Concerns for progression of metastatic disease, goals of care discussions ongoing.  Renal function continued to worsen despite albumin trial.  Goals for care discussion with the patient, patient now amenable to trial of stent placement for potential improvement in kidney function.  Urology re-evaluation pending.    Interval History: No acute events overnight, afebrile, hemodynamically stable.  Patient still reports poor appetite, and still having concerns with nausea/vomiting. Still reports abdominal and lower extremity swelling.  Renal function worsened, despite albumin trial.  Extensive discussions with patient regarding concerns for worsening prognosis.  After goals of care discussions, patient would like to continue treatment at this time " and would like urology re-evaluation as previously she was offered stent placement but she declined at the time due to preference for conservative measures to see if kidney function would improve.  Discussed with Urology, plans for re-evaluation tomorrow. Concerns for progression of metastatic disease, Heme-Onc following, goals of care discussions ongoing.       Objective:     Vital Signs (Most Recent):  Temp: 97.5 °F (36.4 °C) (10/27/24 1605)  Pulse: 90 (10/27/24 1717)  Resp: 20 (10/27/24 1605)  BP: 131/66 (10/27/24 1605)  SpO2: (!) 90 % (10/27/24 1605) Vital Signs (24h Range):  Temp:  [97.5 °F (36.4 °C)-98.4 °F (36.9 °C)] 97.5 °F (36.4 °C)  Pulse:  [] 90  Resp:  [18-20] 20  SpO2:  [90 %-100 %] 90 %  BP: (109-131)/(54-66) 131/66     Weight: 104.6 kg (230 lb 9.6 oz)  Body mass index is 36.12 kg/m².  No intake or output data in the 24 hours ending 10/27/24 1920          Physical Exam  Vitals and nursing note reviewed.   Constitutional:       General: She is not in acute distress.     Appearance: She is obese. She is ill-appearing. She is not toxic-appearing or diaphoretic.   HENT:      Head: Normocephalic and atraumatic.      Mouth/Throat:      Mouth: Mucous membranes are moist.   Eyes:      General: No scleral icterus.        Right eye: No discharge.         Left eye: No discharge.   Cardiovascular:      Rate and Rhythm: Normal rate and regular rhythm.      Heart sounds: Normal heart sounds.   Pulmonary:      Effort: Pulmonary effort is normal. No respiratory distress.      Breath sounds: Normal breath sounds.   Abdominal:      General: Bowel sounds are normal. There is distension.      Palpations: Abdomen is soft.      Tenderness: There is no abdominal tenderness. There is no guarding or rebound.   Musculoskeletal:         General: Swelling present.      Cervical back: No rigidity.      Right lower leg: Edema present.      Left lower leg: Edema present.   Skin:     General: Skin is warm and dry.       Coloration: Skin is not jaundiced.   Neurological:      Mental Status: She is alert and oriented to person, place, and time. Mental status is at baseline.   Psychiatric:         Mood and Affect: Mood normal.         Behavior: Behavior normal.             Significant Labs: All pertinent labs within the past 24 hours have been reviewed.  LABS:  Recent Labs   Lab 10/25/24  0849 10/26/24  0540 10/27/24  0622    140 141   K 4.2 4.4 4.2    110 108   CO2 21* 23 20*   BUN 69* 61* 64*   CREATININE 1.7* 1.7* 2.0*   GLU 89 77 84   ANIONGAP 11 7* 13     Recent Labs   Lab 10/25/24  0849 10/26/24  0540 10/27/24  0622   MG 2.5 2.6 2.7*   PHOS 3.3 3.4 3.5     Recent Labs   Lab 10/25/24  0849 10/26/24  0540 10/27/24  0622   * 744* 765*   * 259* 255*   ALKPHOS 1,117* 965* 859*   BILITOT 1.9* 1.9* 2.8*   ALBUMIN 2.5* 2.3* 3.7     Recent Labs   Lab 10/25/24  0849 10/26/24  0540 10/27/24  0622   WBC 4.37 4.13 3.50*   HGB 11.9* 11.1* 10.1*   HCT 39.5 35.4* 32.0*    202 164   GRAN 77.7*  3.4 73.2*  3.0 75.4*  2.6             Significant Imaging: I have reviewed all pertinent imaging results/findings within the past 24 hours.  US Lower Extremity Veins Bilateral   Final Result      No evidence of deep venous thrombosis in either lower extremity.         Electronically signed by: Lauren Barron   Date:    10/23/2024   Time:    20:57      US Abdomen Limited   Final Result      Severe heterogeneous echotexture throughout the liver with metastatic disease as above.         Electronically signed by: Chandrakant Fermin MD   Date:    10/23/2024   Time:    08:32      X-Ray Chest AP Portable   Final Result      1.  Similar degree of vascular congestion versus reticular interstitial changes throughout the lungs with patchy ground-glass opacities.  Stable small to moderate left effusion.  New small right effusion.  Differential considerations would include chronic or recurrent CHF versus interstitial infectious  process such as atypical viral pneumonia.  Clinical correlation is advised.      2.  Stable findings as noted above.         Electronically signed by: Partha Miguel MD   Date:    10/22/2024   Time:    17:30          Inpatient Medications:    Scheduled Meds:   electrolytes-dextrose  600 mL Oral Q4H    enoxparin  40 mg Subcutaneous Q24H (prophylaxis, 1700)    mupirocin   Nasal BID    sertraline  25 mg Oral Daily     PRN Meds:  Current Facility-Administered Medications:     albuterol sulfate, 2.5 mg, Nebulization, Q4H PRN    ALPRAZolam, 0.25 mg, Oral, TID PRN    alteplase, 2 mg, Intra-Catheter, PRN    dextrose 10%, 12.5 g, Intravenous, PRN    dextrose 10%, 25 g, Intravenous, PRN    fluticasone propionate, 1 spray, Each Nostril, Q12H PRN    glucagon (human recombinant), 1 mg, Intramuscular, PRN    glucose, 16 g, Oral, PRN    glucose, 24 g, Oral, PRN    hydrALAZINE, 10 mg, Intravenous, Q6H PRN    naloxone, 0.02 mg, Intravenous, PRN    ondansetron, 4 mg, Intravenous, Q6H PRN    oxyCODONE, 5 mg, Oral, Q6H PRN    prochlorperazine, 2.5 mg, Intravenous, Q6H PRN    sodium chloride 0.9%, 10 mL, Intravenous, Q12H PRN      Assessment/Plan:      * Acute renal failure superimposed on stage 3a chronic kidney disease  MIRELA is likely due to pre-renal azotemia due to dehydration related to nausea with vomiting, hypotension, inadequate oral intake, and likely also related to progressive metastatic disease. Baseline creatinine is  1.1-1.3 . Most recent creatinine and eGFR are listed below.  Recent Labs     10/25/24  0849 10/26/24  0540 10/27/24  0622   CREATININE 1.7* 1.7* 2.0*   EGFRNORACEVR 32* 32* 27*        Plan  - MIRELA is improving--> patient received albumin 25% 25 g IV x1 dose in the emergency department. Patient is in need of IV fluids for the treatment of MIRELA.  Due to a shortage of IV fluids, patient to receive oral fluids.  Patient must receive this treatment in a hospital location due to the risk of adverse effects, insufficient  response to treatment, or other potential complications of disease. We will encourage increased fluid intake, Pedialyte 600 mL p.o. q.4 hours ordered. 10/26- albumin 25% 100g IV x1 administered  - Avoid nephrotoxins and renally dose meds for GFR listed above  - Monitor urine output, serial BMP, and adjust therapy as needed  - Urology evaluation of hydronephrosis on imaging noted kidney obstruction likely secondary from progression of cancer and after discussion with the patient, patient opted for conservative management at the time.  10/27 given concerns for worsening renal function, patient would like re-evaluation for stent placement.  Discussed with Urology, Urology re-evaluation pending.    Moderate malnutrition  Nutrition consulted. Most recent weight and BMI monitored-     Measurements:  Wt Readings from Last 1 Encounters:   10/24/24 104.6 kg (230 lb 9.6 oz)   Body mass index is 36.12 kg/m².    Patient has been screened and assessed by RD.    Malnutrition Type:  Context: chronic illness  Level: moderate    Malnutrition Characteristic Summary:  Weight Loss (Malnutrition): 10% in 6 months  Energy Intake (Malnutrition): less than or equal to 75% for greater than or equal to 1 month  Fluid Accumulation (Malnutrition): mild    Interventions/Recommendations (treatment strategy):  1. General Healthful Diet  2. Commercial beverage medical food supplement therapy 3. Collaboration by nutrition professional with other providers      Normocytic anemia  Anemia is likely due to chronic disease due to Malignancy and chronic kidney disease. Most recent hemoglobin and hematocrit are listed below.  Recent Labs     10/25/24  0849 10/26/24  0540 10/27/24  0622   HGB 11.9* 11.1* 10.1*   HCT 39.5 35.4* 32.0*       Plan  - Monitor serial CBC: Daily  - Transfuse PRBC if patient becomes hemodynamically unstable, symptomatic or H/H drops below 7/21.  - Patient has not received any PRBC transfusions to date  - Patient's anemia is  "currently stable      History of DVT (deep vein thrombosis)  -recent left lower extremity venous ultrasound 10/15/2024 was negative for DVT  -bilateral lower extremity edema most likely related to metastatic disease and hypoalbuminemia    10/26-Patient reports she is no longer on anticoagulation after she completed her DVT treatment and has discussed this with Dr. Pugh(Heme-Onc)    PLAN:  -Repeat ultrasound negative for DVT  -DVT prophylaxis with enoxaparin    Chronic diastolic CHF (congestive heart failure)  Patient has Diastolic (HFpEF) heart failure that is Chronic. On presentation their CHF was well compensated. Most recent BNP and echo results are listed below.      Latest ECHO  Results for orders placed during the hospital encounter of 09/03/24    Echo    Interpretation Summary    Left Ventricle: The left ventricle is normal in size. Normal wall thickness. There is normal systolic function with a visually estimated ejection fraction of 60 - 65%. Biplane (2D) method of discs ejection fraction is 56%. Global longitudinal strain is -19.5%. There is normal diastolic function.    Right Ventricle: Normal right ventricular cavity size. Wall thickness is normal. Systolic function is normal.    IVC/SVC: Normal venous pressure at 3 mmHg.    Malignant neoplasm of both ovaries    Baseline echo 9/3/2024  NORM -19.47%  Biplane 56%    Current Heart Failure Medications  hydrALAZINE injection 10 mg, Every 6 hours PRN, Intravenous    Plan  - Monitor strict I&Os and daily weights.    - Place on telemetry  - Cardiology has not been consulted  - suspect volume overload likely secondary to 3rd spacing in the setting of failure and hepatic failure    Chronic respiratory failure with hypoxia  Patient with Hypoxic Respiratory failure which is Chronic.  she is on home oxygen at 3-4 LPM.     -CXR this admit with " Similar degree of vascular congestion versus reticular interstitial changes throughout the lungs with patchy ground-glass " "opacities.  Stable small to moderate left effusion.  New small right effusion.  Differential considerations would include chronic or recurrent CHF versus interstitial infectious process such as atypical viral pneumonia.  Clinical correlation is advised."  -recent CTA of chest 10/02/2024 was reviewed  -initial labs notable for white blood cell count 4.10, lactic acid level 1.0, BNP 27, afebrile  -procalcitonin level elevated, however utility limited given MIRELA    -no antibiotics warranted at this time  -no diuretics warranted at this time  -continuous pulse oximetry monitoring, O2 supplementation, incentive spirometry, and p.r.n. albuterol inhaler    Elevated liver enzymes  Upon admission, alk phos 1018, total bilirubin 1.2, , , INR 1.2, BNP 27, lactic acid level 1.0, creatinine 2.4  -Evaluation of transaminitis with hepatitis panel negative, and abdominal ultrasound noting previously seen "severely heterogeneous echotexture throughout the liver with poorly defined mass within the left hepatic lobe measuring up to 8.8 cm. Additional smaller hypoechoic lesions seen throughout concerning for metastatic disease. " Negative for gallstones, common bile duct dilation, or Back's sign.     Recent Labs     10/25/24  0849 10/26/24  0540 10/27/24  0622   * 744* 765*   * 259* 255*       -Evaluation of transaminitis by Heme-Onc noted likely secondary to hypovolemia with concerns if no improvement could be secondary to advancement of malignancy but less likely given recent chemotherapy resumption.      -hold Crestor and avoid hepatotoxic agents  -recent CT scan of abdomen/pelvis and PET scan were reviewed  -10/24/24 Discussion with hepatology, Interventional Radiology, Heme-Onc regarding worsened liver function.  Concerns for progression of metastatic disease, goals of care discussions ongoing.       Hypotension due to hypovolemia  -hypotension improved with IV albumin given in the emergency " "department  -hold Norvasc, atenolol, Benicar/HCT  -limit sedating agents      Nausea & vomiting  -bland diet, Pedialyte scheduled, IV albumin x1 dose  -p.r.n. Antiemetics        Transaminitis  Recent Labs     10/25/24  0849 10/26/24  0540 10/27/24  0622   * 744* 765*   * 259* 255*           10/24/24 Discussion with hepatology, Interventional Radiology, Heme-Onc regarding worsened liver function.  Concerns for progression of metastatic disease, goals of care discussions ongoing.       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 New moderate-sized bilateral pleural effusions as described above with adjacent atelectasis or consolidation.No enlarged mediastinal and hilar lymph nodes. Intralobular septal thickening and associated groundglass opacity bilaterally felt to represent edema.New irregular soft tissue mass in the left upper quadrant concerning for carcinomatosis."  -CT scan of abdomen and pelvis 10/06/2024 with Widespread metastatic disease suspected with abnormal lymph nodes within the retroperitoneum, jovan hepatis, mediastinum as well as multiple abnormal liver lesions and focal thickening within the large bowel. Patient needs tissue sampling of liver lesion. Recommend colonoscopy and possible biopsy of sigmoid lesion."  -nuclear medicine PET scan 10/11/2024 with Overall there has been progression of disease. There is continued hypermetabolic adenopathy within the neck, chest (mediastinum and chest wall).  Some of these areas are stable with some nodes slightly diminished and some decreased degrees of FDG uptake within some of these nodes. However, there is extensive progression of peritoneal carcinomatosis throughout the " "abdomen and pelvis with increasing number and size of peritoneal hypermetabolic nodularity and increasing amount of ascites.  There is also large amount heterogeneous FDG uptake within the left hepatic lobe concerning for hepatic metastatic disease which is new. there has also been interval development moderate right-sided hydronephrosis and hydroureter with ureter likely being obstructed by peritoneal implants. increasing small to moderate bilateral loculated pleural effusions.  Extensive mixed interstitial and ground-glass infiltrates throughout the lungs.  Question pneumonia/post treatment related changes.  Lymphangitic carcinomatosis could appear similar"      -Heme-Onc consulted and after goals of care discussion, plan to continue with treatment at this time.  Follow up recs.   - Discussion with hepatology, Interventional Radiology, Heme-Onc regarding worsened liver function.  Concerns for progression of metastatic disease, goals of care discussions ongoing.    -p.r.n. pain control    Peritoneal carcinomatosis  See discussion for bilateral ovarian cancer    Hyperlipidemia  -hold Crestor in the setting of worsening LFTs          VTE Risk Mitigation (From admission, onward)           Ordered     enoxaparin injection 40 mg  Every 24 hours         10/26/24 1612     IP VTE HIGH RISK PATIENT  Once         10/22/24 2032     Place sequential compression device  Until discontinued         10/22/24 2032                    Discharge Planning   EVE:      Code Status: Full Code   Is the patient medically ready for discharge?:     Reason for patient still in hospital (select all that apply): Patient trending condition, Laboratory test, Treatment, and Consult recommendations  Discharge Plan A: Home with family                  Anival Hernandez DO  Department of Hospital Medicine   [facility]    Voice recognition software was used in the creation of this note/communication and any sound-alike/typographical errors " which may have occurred despite initial review prior to signing should be taken in context when interpreting.  If such errors prevent a clear understanding of the note/communication, please contact the provider/office for clarification.

## 2024-10-27 NOTE — PROGRESS NOTES
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 Pugh from clinic for hypotension and abnormal labs with acute kidney injury and worsening liver enzymes.  Symptoms were acute onset, moderate severity, and with blood pressure improved with albumin administration given in the emergency department.  Patient denies any associated chest pain, increased shortness of breath, cough, fevers, chills.  She does complain of recurrent episodes of nausea with vomiting.  She has chronic abdominal pain but has noted increasing distention to her abdomen as well as increasing lower extremity swelling.  She denies any diarrhea, constipation, dysuria, or hematuria.  Patient is actively receiving chemotherapy with last dose received 10/15/2024.    Last hospital admit 10/2-10/07/2024 for shortness for breath, new bilateral pleural effusion, pneumonia, and images with worsening metastatic disease.  Patient received IV cefepime.    Overview/Hospital Course:  Admitted to Hospital Medicine for evaluation of constitutional symptoms and labs concerning for MIRELA in setting of poor oral intake.   "Started on increased fluid hydration.  Heme-Onc consulted and after goals of care discussion, plan to continue with treatment at this time.  Evaluation of lower extremity pain/swelling without concerns for DVT.  Evaluation of transaminitis with hepatitis panel negative, and abdominal ultrasound noting previously seen "severely heterogeneous echotexture throughout the liver with poorly defined mass within the left hepatic lobe measuring up to 8.8 cm. Additional smaller hypoechoic lesions seen throughout concerning for metastatic disease. " Negative for gallstones, common bile duct dilation, or Back's sign. Evaluation of transaminitis by Heme-Onc noted likely secondary to hypovolemia with concerns if no improvement could be secondary to advancement of malignancy but less likely given recent chemotherapy resumption. Abdominal ultrasound noted concerns for "Mild to moderate right-sided hydronephrosis", Urology evaluation noted kidney obstruction likely secondary from progression of cancer and after discussion with the patient, patient opted for conservative management at the time.  Discussion with hepatology, Interventional Radiology, Heme-Onc regarding worsened liver function.  Concerns for progression of metastatic disease, goals of care discussions ongoing.    Interval History: No acute events overnight, afebrile, hemodynamically stable.  Patient still reports poor appetite, but has been trying to drink increased fluids. Still reports lower extremity swelling. Concerns for progression of metastatic disease, Heme-Onc following, goals of care discussions ongoing.       Objective:     Vital Signs (Most Recent):  Temp: 98.3 °F (36.8 °C) (10/1955)  Pulse: 92 (10/26/24 2100)  Resp: 18 (10/1955)  BP: 119/60 (10/1955)  SpO2: 100 % (10/26/24 2018) Vital Signs (24h Range):  Temp:  [97.8 °F (36.6 °C)-98.5 °F (36.9 °C)] 98.3 °F (36.8 °C)  Pulse:  [] 92  Resp:  [18-20] 18  SpO2:  [90 %-100 %] 100 %  BP: " ()/(53-60) 119/60     Weight: 104.6 kg (230 lb 9.6 oz)  Body mass index is 36.12 kg/m².    Intake/Output Summary (Last 24 hours) at 10/26/2024 7398  Last data filed at 10/26/2024 0705  Gross per 24 hour   Intake --   Output 100 ml   Net -100 ml           Physical Exam  Vitals and nursing note reviewed.   Constitutional:       General: She is not in acute distress.     Appearance: She is obese. She is ill-appearing. She is not toxic-appearing or diaphoretic.   HENT:      Head: Normocephalic and atraumatic.      Mouth/Throat:      Mouth: Mucous membranes are moist.   Eyes:      General: No scleral icterus.        Right eye: No discharge.         Left eye: No discharge.   Cardiovascular:      Rate and Rhythm: Normal rate and regular rhythm.      Heart sounds: Normal heart sounds.   Pulmonary:      Effort: Pulmonary effort is normal. No respiratory distress.      Breath sounds: Normal breath sounds.   Abdominal:      General: Bowel sounds are normal.      Palpations: Abdomen is soft.   Musculoskeletal:         General: Swelling present.      Cervical back: No rigidity.      Right lower leg: Edema present.      Left lower leg: Edema present.   Skin:     General: Skin is warm and dry.      Coloration: Skin is not jaundiced.   Neurological:      Mental Status: She is alert and oriented to person, place, and time. Mental status is at baseline.   Psychiatric:         Mood and Affect: Mood normal.         Behavior: Behavior normal.             Significant Labs: All pertinent labs within the past 24 hours have been reviewed.  LABS:  Recent Labs   Lab 10/24/24  0951 10/25/24  0849 10/26/24  0540    142 140   K 4.4 4.2 4.4    110 110   CO2 22* 21* 23   BUN 74* 69* 61*   CREATININE 1.9* 1.7* 1.7*   GLU 82 89 77   ANIONGAP 11 11 7*     Recent Labs   Lab 10/24/24  0951 10/25/24  0849 10/26/24  0540   MG 2.6 2.5 2.6   PHOS 3.3 3.3 3.4     Recent Labs   Lab 10/24/24  0951 10/25/24  0849 10/26/24  0540   *  787* 744*   * 273* 259*   ALKPHOS 1,072* 1,117* 965*   BILITOT 1.7* 1.9* 1.9*   ALBUMIN 2.7* 2.5* 2.3*     Recent Labs   Lab 10/24/24  0951 10/25/24  0849 10/26/24  0540   WBC 4.39 4.37 4.13   HGB 12.0 11.9* 11.1*   HCT 38.0 39.5 35.4*    227 202   GRAN 77.9*  3.4 77.7*  3.4 73.2*  3.0             Significant Imaging: I have reviewed all pertinent imaging results/findings within the past 24 hours.  US Lower Extremity Veins Bilateral   Final Result      No evidence of deep venous thrombosis in either lower extremity.         Electronically signed by: Lauren Barron   Date:    10/23/2024   Time:    20:57      US Abdomen Limited   Final Result      Severe heterogeneous echotexture throughout the liver with metastatic disease as above.         Electronically signed by: Chandrakant Fermin MD   Date:    10/23/2024   Time:    08:32      X-Ray Chest AP Portable   Final Result      1.  Similar degree of vascular congestion versus reticular interstitial changes throughout the lungs with patchy ground-glass opacities.  Stable small to moderate left effusion.  New small right effusion.  Differential considerations would include chronic or recurrent CHF versus interstitial infectious process such as atypical viral pneumonia.  Clinical correlation is advised.      2.  Stable findings as noted above.         Electronically signed by: Partha Miguel MD   Date:    10/22/2024   Time:    17:30          Inpatient Medications:  Continuous Infusions:  Scheduled Meds:   electrolytes-dextrose  600 mL Oral Q4H    [START ON 10/27/2024] enoxparin  40 mg Subcutaneous Q24H (prophylaxis, 1700)    mupirocin   Nasal BID    sertraline  25 mg Oral Daily     PRN Meds:  Current Facility-Administered Medications:     albuterol sulfate, 2.5 mg, Nebulization, Q4H PRN    ALPRAZolam, 0.25 mg, Oral, TID PRN    alteplase, 2 mg, Intra-Catheter, PRN    dextrose 10%, 12.5 g, Intravenous, PRN    dextrose 10%, 25 g, Intravenous, PRN    fluticasone  propionate, 1 spray, Each Nostril, Q12H PRN    glucagon (human recombinant), 1 mg, Intramuscular, PRN    glucose, 16 g, Oral, PRN    glucose, 24 g, Oral, PRN    hydrALAZINE, 10 mg, Intravenous, Q6H PRN    naloxone, 0.02 mg, Intravenous, PRN    ondansetron, 4 mg, Intravenous, Q6H PRN    oxyCODONE, 5 mg, Oral, Q6H PRN    prochlorperazine, 2.5 mg, Intravenous, Q6H PRN    sodium chloride 0.9%, 10 mL, Intravenous, Q12H PRN      Assessment/Plan:      * Acute renal failure superimposed on stage 3a chronic kidney disease  MIRELA is likely due to pre-renal azotemia due to dehydration related to nausea with vomiting, hypotension, inadequate oral intake, and likely also related to progressive metastatic disease. Baseline creatinine is  1.1-1.3 . Most recent creatinine and eGFR are listed below.  Recent Labs     10/24/24  0951 10/25/24  0849 10/26/24  0540   CREATININE 1.9* 1.7* 1.7*   EGFRNORACEVR 28* 32* 32*      Plan  - MIRELA is improving--> patient received albumin 25% 25 g IV x1 dose in the emergency department. Patient is in need of IV fluids for the treatment of MIRELA.  Due to a shortage of IV fluids, patient to receive oral fluids.  Patient must receive this treatment in a hospital location due to the risk of adverse effects, insufficient response to treatment, or other potential complications of disease. We will encourage increased fluid intake, Pedialyte 600 mL p.o. q.4 hours ordered. 10/26- albumin 25% 100g IV x1 administered  - Avoid nephrotoxins and renally dose meds for GFR listed above  - Monitor urine output, serial BMP, and adjust therapy as needed  - Urology evaluation of hydronephrosis on imaging noted kidney obstruction likely secondary from progression of cancer and after discussion with the patient, patient opted for conservative management at the time.      Moderate malnutrition  Nutrition consulted. Most recent weight and BMI monitored-     Measurements:  Wt Readings from Last 1 Encounters:   10/24/24 104.6 kg  (230 lb 9.6 oz)   Body mass index is 36.12 kg/m².    Patient has been screened and assessed by RD.    Malnutrition Type:  Context: chronic illness  Level: moderate    Malnutrition Characteristic Summary:  Weight Loss (Malnutrition): 10% in 6 months  Energy Intake (Malnutrition): less than or equal to 75% for greater than or equal to 1 month  Fluid Accumulation (Malnutrition): mild    Interventions/Recommendations (treatment strategy):  1. General Healthful Diet  2. Commercial beverage medical food supplement therapy 3. Collaboration by nutrition professional with other providers      Normocytic anemia  Anemia is likely due to chronic disease due to Malignancy and chronic kidney disease. Most recent hemoglobin and hematocrit are listed below.  Recent Labs     10/24/24  0951 10/25/24  0849 10/26/24  0540   HGB 12.0 11.9* 11.1*   HCT 38.0 39.5 35.4*       Plan  - Monitor serial CBC: Daily  - Transfuse PRBC if patient becomes hemodynamically unstable, symptomatic or H/H drops below 7/21.  - Patient has not received any PRBC transfusions to date  - Patient's anemia is currently stable      History of DVT (deep vein thrombosis)  -recent left lower extremity venous ultrasound 10/15/2024 was negative for DVT  -bilateral lower extremity edema most likely related to metastatic disease and hypoalbuminemia    10/26-Patient reports she is no longer on anticoagulation after she completed her DVT treatment and has discussed this with Dr. Pugh(Heme-Onc)    PLAN:  -Repeat ultrasound negative for DVT  -DVT prophylaxis with enoxaparin    Chronic diastolic CHF (congestive heart failure)  Patient has Diastolic (HFpEF) heart failure that is Chronic. On presentation their CHF was well compensated. Most recent BNP and echo results are listed below.      Latest ECHO  Results for orders placed during the hospital encounter of 09/03/24    Echo    Interpretation Summary    Left Ventricle: The left ventricle is normal in size. Normal wall  "thickness. There is normal systolic function with a visually estimated ejection fraction of 60 - 65%. Biplane (2D) method of discs ejection fraction is 56%. Global longitudinal strain is -19.5%. There is normal diastolic function.    Right Ventricle: Normal right ventricular cavity size. Wall thickness is normal. Systolic function is normal.    IVC/SVC: Normal venous pressure at 3 mmHg.    Malignant neoplasm of both ovaries    Baseline echo 9/3/2024  NORM -19.47%  Biplane 56%    Current Heart Failure Medications  hydrALAZINE injection 10 mg, Every 6 hours PRN, Intravenous    Plan  - Monitor strict I&Os and daily weights.    - Place on telemetry  - Cardiology has not been consulted  - The patient's volume status is at their baseline    Chronic respiratory failure with hypoxia  Patient with Hypoxic Respiratory failure which is Chronic.  she is on home oxygen at 3-4 LPM. Supplemental oxygen was provided and noted- Oxygen Concentration (%):  [36] 36    -CXR this admit with " Similar degree of vascular congestion versus reticular interstitial changes throughout the lungs with patchy ground-glass opacities.  Stable small to moderate left effusion.  New small right effusion.  Differential considerations would include chronic or recurrent CHF versus interstitial infectious process such as atypical viral pneumonia.  Clinical correlation is advised."  -recent CTA of chest 10/02/2024 was reviewed  -initial labs notable for white blood cell count 4.10, lactic acid level 1.0, BNP 27, afebrile  -procalcitonin level elevated, however utility limited given MIRELA    -no antibiotics warranted at this time  -no diuretics warranted at this time  -continuous pulse oximetry monitoring, O2 supplementation, incentive spirometry, and p.r.n. albuterol inhaler    Elevated liver enzymes  Upon admission, alk phos 1018, total bilirubin 1.2, , , INR 1.2, BNP 27, lactic acid level 1.0, creatinine 2.4  -Evaluation of transaminitis with " "hepatitis panel negative, and abdominal ultrasound noting previously seen "severely heterogeneous echotexture throughout the liver with poorly defined mass within the left hepatic lobe measuring up to 8.8 cm. Additional smaller hypoechoic lesions seen throughout concerning for metastatic disease. " Negative for gallstones, common bile duct dilation, or Back's sign.   -Evaluation of transaminitis by Heme-Onc noted likely secondary to hypovolemia with concerns if no improvement could be secondary to advancement of malignancy but less likely given recent chemotherapy resumption.      -hold Crestor and avoid hepatotoxic agents  -recent CT scan of abdomen/pelvis and PET scan were reviewed  -10/24/24 Discussion with hepatology, Interventional Radiology, Heme-Onc regarding worsened liver function.  Concerns for progression of metastatic disease, goals of care discussions ongoing.       Hypotension due to hypovolemia  -hypotension improved with IV albumin given in the emergency department  -hold Norvasc, atenolol, Benicar/HCT  -limit sedating agents      Nausea & vomiting  -bland diet, Pedialyte scheduled, IV albumin x1 dose  -p.r.n. Antiemetics        Transaminitis  Recent Labs     10/24/24  0951 10/25/24  0849 10/26/24  0540   * 787* 744*   * 273* 259*           10/24/24 Discussion with hepatology, Interventional Radiology, Heme-Onc regarding worsened liver function.  Concerns for progression of metastatic disease, goals of care discussions ongoing.       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 New moderate-sized bilateral pleural effusions as described above with adjacent atelectasis or consolidation.No enlarged mediastinal and hilar " "lymph nodes. Intralobular septal thickening and associated groundglass opacity bilaterally felt to represent edema.New irregular soft tissue mass in the left upper quadrant concerning for carcinomatosis."  -CT scan of abdomen and pelvis 10/06/2024 with Widespread metastatic disease suspected with abnormal lymph nodes within the retroperitoneum, jovan hepatis, mediastinum as well as multiple abnormal liver lesions and focal thickening within the large bowel. Patient needs tissue sampling of liver lesion. Recommend colonoscopy and possible biopsy of sigmoid lesion."  -nuclear medicine PET scan 10/11/2024 with Overall there has been progression of disease. There is continued hypermetabolic adenopathy within the neck, chest (mediastinum and chest wall).  Some of these areas are stable with some nodes slightly diminished and some decreased degrees of FDG uptake within some of these nodes. However, there is extensive progression of peritoneal carcinomatosis throughout the abdomen and pelvis with increasing number and size of peritoneal hypermetabolic nodularity and increasing amount of ascites.  There is also large amount heterogeneous FDG uptake within the left hepatic lobe concerning for hepatic metastatic disease which is new. there has also been interval development moderate right-sided hydronephrosis and hydroureter with ureter likely being obstructed by peritoneal implants. increasing small to moderate bilateral loculated pleural effusions.  Extensive mixed interstitial and ground-glass infiltrates throughout the lungs.  Question pneumonia/post treatment related changes.  Lymphangitic carcinomatosis could appear similar"      -Heme-Onc consulted and after goals of care discussion, plan to continue with treatment at this time.  Follow up recs.   - Discussion with hepatology, Interventional Radiology, Heme-Onc regarding worsened liver function.  Concerns for progression of metastatic disease, goals of care " discussions ongoing.   -p.r.n. pain control            Peritoneal carcinomatosis  See discussion for bilateral ovarian cancer    Hyperlipidemia  -hold Crestor in the setting of worsening LFTs          VTE Risk Mitigation (From admission, onward)           Ordered     enoxaparin injection 40 mg  Every 24 hours         10/26/24 1612     IP VTE HIGH RISK PATIENT  Once         10/22/24 2032     Place sequential compression device  Until discontinued         10/22/24 2032                    Discharge Planning   EVE:      Code Status: Full Code   Is the patient medically ready for discharge?:     Reason for patient still in hospital (select all that apply): Patient trending condition, Laboratory test, and Treatment  Discharge Plan A: Home with family            Ainval Hernandez DO  Department of Hospital Medicine   [facility]    Voice recognition software was used in the creation of this note/communication and any sound-alike/typographical errors which may have occurred despite initial review prior to signing should be taken in context when interpreting.  If such errors prevent a clear understanding of the note/communication, please contact the provider/office for clarification.

## 2024-10-27 NOTE — ASSESSMENT & PLAN NOTE
MIRELA is likely due to pre-renal azotemia due to dehydration related to nausea with vomiting, hypotension, inadequate oral intake, and likely also related to progressive metastatic disease. Baseline creatinine is  1.1-1.3 . Most recent creatinine and eGFR are listed below.  Recent Labs     10/25/24  0849 10/26/24  0540 10/27/24  0622   CREATININE 1.7* 1.7* 2.0*   EGFRNORACEVR 32* 32* 27*        Plan  - MIRELA is improving--> patient received albumin 25% 25 g IV x1 dose in the emergency department. Patient is in need of IV fluids for the treatment of MIRELA.  Due to a shortage of IV fluids, patient to receive oral fluids.  Patient must receive this treatment in a hospital location due to the risk of adverse effects, insufficient response to treatment, or other potential complications of disease. We will encourage increased fluid intake, Pedialyte 600 mL p.o. q.4 hours ordered. 10/26- albumin 25% 100g IV x1 administered  - Avoid nephrotoxins and renally dose meds for GFR listed above  - Monitor urine output, serial BMP, and adjust therapy as needed  - Urology evaluation of hydronephrosis on imaging noted kidney obstruction likely secondary from progression of cancer and after discussion with the patient, patient opted for conservative management at the time.  10/27 given concerns for worsening renal function, patient would like re-evaluation for stent placement.  Discussed with Urology, Urology re-evaluation pending.

## 2024-10-27 NOTE — PLAN OF CARE
Discussed poc with pt, pt verbalized understanding  Purposeful rounding every 2hours  VS wnl  Cardiac monitoring in use, pt is MARGA, tele monitor # [id number]   Fall precautions in place, remains injury free  Pain and nausea under control with PRN meds  IVFs  Accurate I&Os  Abx given as prescribed  Bed locked at lowest position  Call light within reach  Chart check complete  Will cont with POC    Problem: Skin Injury Risk Increased  Goal: Skin Health and Integrity  Outcome: Progressing     Problem: Adult Inpatient Plan of Care  Goal: Plan of Care Review  Outcome: Progressing  Goal: Patient-Specific Goal (Individualized)  Outcome: Progressing  Goal: Absence of Hospital-Acquired Illness or Injury  Outcome: Progressing  Goal: Optimal Comfort and Wellbeing  Outcome: Progressing  Goal: Readiness for Transition of Care  Outcome: Progressing     Problem: Infection  Goal: Absence of Infection Signs and Symptoms  Outcome: Progressing     Problem: Acute Kidney Injury/Impairment  Goal: Fluid and Electrolyte Balance  Outcome: Progressing  Goal: Improved Oral Intake  Outcome: Progressing  Goal: Effective Renal Function  Outcome: Progressing     Problem: Pneumonia  Goal: Fluid Balance  Outcome: Progressing  Goal: Resolution of Infection Signs and Symptoms  Outcome: Progressing  Goal: Effective Oxygenation and Ventilation  Outcome: Progressing

## 2024-10-27 NOTE — ASSESSMENT & PLAN NOTE
-recent left lower extremity venous ultrasound 10/15/2024 was negative for DVT  -bilateral lower extremity edema most likely related to metastatic disease and hypoalbuminemia    10/26-Patient reports she is no longer on anticoagulation after she completed her DVT treatment and has discussed this with Dr. Pugh(Heme-Onc)    PLAN:  -Repeat ultrasound negative for DVT  -DVT prophylaxis with enoxaparin

## 2024-10-27 NOTE — SUBJECTIVE & OBJECTIVE
Interval History: No acute events overnight, afebrile, hemodynamically stable.  Patient still reports poor appetite, and still having concerns with nausea/vomiting. Still reports abdominal and lower extremity swelling.  Renal function worsened, despite albumin trial.  Extensive discussions with patient regarding concerns for worsening prognosis.  After goals of care discussions, patient would like to continue treatment at this time and would like urology re-evaluation as previously she was offered stent placement but she declined at the time due to preference for conservative measures to see if kidney function would improve.  Discussed with Urology, plans for re-evaluation tomorrow. Concerns for progression of metastatic disease, Heme-Onc following, goals of care discussions ongoing.       Objective:     Vital Signs (Most Recent):  Temp: 97.5 °F (36.4 °C) (10/27/24 1605)  Pulse: 90 (10/27/24 1717)  Resp: 20 (10/27/24 1605)  BP: 131/66 (10/27/24 1605)  SpO2: (!) 90 % (10/27/24 1605) Vital Signs (24h Range):  Temp:  [97.5 °F (36.4 °C)-98.4 °F (36.9 °C)] 97.5 °F (36.4 °C)  Pulse:  [] 90  Resp:  [18-20] 20  SpO2:  [90 %-100 %] 90 %  BP: (109-131)/(54-66) 131/66     Weight: 104.6 kg (230 lb 9.6 oz)  Body mass index is 36.12 kg/m².  No intake or output data in the 24 hours ending 10/27/24 1920          Physical Exam  Vitals and nursing note reviewed.   Constitutional:       General: She is not in acute distress.     Appearance: She is obese. She is ill-appearing. She is not toxic-appearing or diaphoretic.   HENT:      Head: Normocephalic and atraumatic.      Mouth/Throat:      Mouth: Mucous membranes are moist.   Eyes:      General: No scleral icterus.        Right eye: No discharge.         Left eye: No discharge.   Cardiovascular:      Rate and Rhythm: Normal rate and regular rhythm.      Heart sounds: Normal heart sounds.   Pulmonary:      Effort: Pulmonary effort is normal. No respiratory distress.      Breath  sounds: Normal breath sounds.   Abdominal:      General: Bowel sounds are normal. There is distension.      Palpations: Abdomen is soft.      Tenderness: There is no abdominal tenderness. There is no guarding or rebound.   Musculoskeletal:         General: Swelling present.      Cervical back: No rigidity.      Right lower leg: Edema present.      Left lower leg: Edema present.   Skin:     General: Skin is warm and dry.      Coloration: Skin is not jaundiced.   Neurological:      Mental Status: She is alert and oriented to person, place, and time. Mental status is at baseline.   Psychiatric:         Mood and Affect: Mood normal.         Behavior: Behavior normal.             Significant Labs: All pertinent labs within the past 24 hours have been reviewed.  LABS:  Recent Labs   Lab 10/25/24  0849 10/26/24  0540 10/27/24  0622    140 141   K 4.2 4.4 4.2    110 108   CO2 21* 23 20*   BUN 69* 61* 64*   CREATININE 1.7* 1.7* 2.0*   GLU 89 77 84   ANIONGAP 11 7* 13     Recent Labs   Lab 10/25/24  0849 10/26/24  0540 10/27/24  0622   MG 2.5 2.6 2.7*   PHOS 3.3 3.4 3.5     Recent Labs   Lab 10/25/24  0849 10/26/24  0540 10/27/24  0622   * 744* 765*   * 259* 255*   ALKPHOS 1,117* 965* 859*   BILITOT 1.9* 1.9* 2.8*   ALBUMIN 2.5* 2.3* 3.7     Recent Labs   Lab 10/25/24  0849 10/26/24  0540 10/27/24  0622   WBC 4.37 4.13 3.50*   HGB 11.9* 11.1* 10.1*   HCT 39.5 35.4* 32.0*    202 164   GRAN 77.7*  3.4 73.2*  3.0 75.4*  2.6             Significant Imaging: I have reviewed all pertinent imaging results/findings within the past 24 hours.  US Lower Extremity Veins Bilateral   Final Result      No evidence of deep venous thrombosis in either lower extremity.         Electronically signed by: Lauren Barron   Date:    10/23/2024   Time:    20:57      US Abdomen Limited   Final Result      Severe heterogeneous echotexture throughout the liver with metastatic disease as above.          Electronically signed by: Chandrakant Fermin MD   Date:    10/23/2024   Time:    08:32      X-Ray Chest AP Portable   Final Result      1.  Similar degree of vascular congestion versus reticular interstitial changes throughout the lungs with patchy ground-glass opacities.  Stable small to moderate left effusion.  New small right effusion.  Differential considerations would include chronic or recurrent CHF versus interstitial infectious process such as atypical viral pneumonia.  Clinical correlation is advised.      2.  Stable findings as noted above.         Electronically signed by: Partha Miguel MD   Date:    10/22/2024   Time:    17:30          Inpatient Medications:    Scheduled Meds:   electrolytes-dextrose  600 mL Oral Q4H    enoxparin  40 mg Subcutaneous Q24H (prophylaxis, 1700)    mupirocin   Nasal BID    sertraline  25 mg Oral Daily     PRN Meds:  Current Facility-Administered Medications:     albuterol sulfate, 2.5 mg, Nebulization, Q4H PRN    ALPRAZolam, 0.25 mg, Oral, TID PRN    alteplase, 2 mg, Intra-Catheter, PRN    dextrose 10%, 12.5 g, Intravenous, PRN    dextrose 10%, 25 g, Intravenous, PRN    fluticasone propionate, 1 spray, Each Nostril, Q12H PRN    glucagon (human recombinant), 1 mg, Intramuscular, PRN    glucose, 16 g, Oral, PRN    glucose, 24 g, Oral, PRN    hydrALAZINE, 10 mg, Intravenous, Q6H PRN    naloxone, 0.02 mg, Intravenous, PRN    ondansetron, 4 mg, Intravenous, Q6H PRN    oxyCODONE, 5 mg, Oral, Q6H PRN    prochlorperazine, 2.5 mg, Intravenous, Q6H PRN    sodium chloride 0.9%, 10 mL, Intravenous, Q12H PRN

## 2024-10-27 NOTE — SUBJECTIVE & OBJECTIVE
Interval History: No acute events overnight, afebrile, hemodynamically stable.  Patient still reports poor appetite, but has been trying to drink increased fluids. Still reports lower extremity swelling. Concerns for progression of metastatic disease, Heme-Onc following, goals of care discussions ongoing.       Objective:     Vital Signs (Most Recent):  Temp: 98.3 °F (36.8 °C) (10/1955)  Pulse: 92 (10/26/24 2100)  Resp: 18 (10/1955)  BP: 119/60 (10/1955)  SpO2: 100 % (10/26/24 2018) Vital Signs (24h Range):  Temp:  [97.8 °F (36.6 °C)-98.5 °F (36.9 °C)] 98.3 °F (36.8 °C)  Pulse:  [] 92  Resp:  [18-20] 18  SpO2:  [90 %-100 %] 100 %  BP: ()/(53-60) 119/60     Weight: 104.6 kg (230 lb 9.6 oz)  Body mass index is 36.12 kg/m².    Intake/Output Summary (Last 24 hours) at 10/26/2024 6089  Last data filed at 10/26/2024 0705  Gross per 24 hour   Intake --   Output 100 ml   Net -100 ml           Physical Exam  Vitals and nursing note reviewed.   Constitutional:       General: She is not in acute distress.     Appearance: She is obese. She is ill-appearing. She is not toxic-appearing or diaphoretic.   HENT:      Head: Normocephalic and atraumatic.      Mouth/Throat:      Mouth: Mucous membranes are moist.   Eyes:      General: No scleral icterus.        Right eye: No discharge.         Left eye: No discharge.   Cardiovascular:      Rate and Rhythm: Normal rate and regular rhythm.      Heart sounds: Normal heart sounds.   Pulmonary:      Effort: Pulmonary effort is normal. No respiratory distress.      Breath sounds: Normal breath sounds.   Abdominal:      General: Bowel sounds are normal.      Palpations: Abdomen is soft.   Musculoskeletal:         General: Swelling present.      Cervical back: No rigidity.      Right lower leg: Edema present.      Left lower leg: Edema present.   Skin:     General: Skin is warm and dry.      Coloration: Skin is not jaundiced.   Neurological:      Mental Status:  She is alert and oriented to person, place, and time. Mental status is at baseline.   Psychiatric:         Mood and Affect: Mood normal.         Behavior: Behavior normal.             Significant Labs: All pertinent labs within the past 24 hours have been reviewed.  LABS:  Recent Labs   Lab 10/24/24  0951 10/25/24  0849 10/26/24  0540    142 140   K 4.4 4.2 4.4    110 110   CO2 22* 21* 23   BUN 74* 69* 61*   CREATININE 1.9* 1.7* 1.7*   GLU 82 89 77   ANIONGAP 11 11 7*     Recent Labs   Lab 10/24/24  0951 10/25/24  0849 10/26/24  0540   MG 2.6 2.5 2.6   PHOS 3.3 3.3 3.4     Recent Labs   Lab 10/24/24  0951 10/25/24  0849 10/26/24  0540   * 787* 744*   * 273* 259*   ALKPHOS 1,072* 1,117* 965*   BILITOT 1.7* 1.9* 1.9*   ALBUMIN 2.7* 2.5* 2.3*     Recent Labs   Lab 10/24/24  0951 10/25/24  0849 10/26/24  0540   WBC 4.39 4.37 4.13   HGB 12.0 11.9* 11.1*   HCT 38.0 39.5 35.4*    227 202   GRAN 77.9*  3.4 77.7*  3.4 73.2*  3.0             Significant Imaging: I have reviewed all pertinent imaging results/findings within the past 24 hours.  US Lower Extremity Veins Bilateral   Final Result      No evidence of deep venous thrombosis in either lower extremity.         Electronically signed by: Lauren Barron   Date:    10/23/2024   Time:    20:57      US Abdomen Limited   Final Result      Severe heterogeneous echotexture throughout the liver with metastatic disease as above.         Electronically signed by: Chandrakant Fermin MD   Date:    10/23/2024   Time:    08:32      X-Ray Chest AP Portable   Final Result      1.  Similar degree of vascular congestion versus reticular interstitial changes throughout the lungs with patchy ground-glass opacities.  Stable small to moderate left effusion.  New small right effusion.  Differential considerations would include chronic or recurrent CHF versus interstitial infectious process such as atypical viral pneumonia.  Clinical correlation is advised.       2.  Stable findings as noted above.         Electronically signed by: Partha Miguel MD   Date:    10/22/2024   Time:    17:30          Inpatient Medications:  Continuous Infusions:  Scheduled Meds:   electrolytes-dextrose  600 mL Oral Q4H    [START ON 10/27/2024] enoxparin  40 mg Subcutaneous Q24H (prophylaxis, 1700)    mupirocin   Nasal BID    sertraline  25 mg Oral Daily     PRN Meds:  Current Facility-Administered Medications:     albuterol sulfate, 2.5 mg, Nebulization, Q4H PRN    ALPRAZolam, 0.25 mg, Oral, TID PRN    alteplase, 2 mg, Intra-Catheter, PRN    dextrose 10%, 12.5 g, Intravenous, PRN    dextrose 10%, 25 g, Intravenous, PRN    fluticasone propionate, 1 spray, Each Nostril, Q12H PRN    glucagon (human recombinant), 1 mg, Intramuscular, PRN    glucose, 16 g, Oral, PRN    glucose, 24 g, Oral, PRN    hydrALAZINE, 10 mg, Intravenous, Q6H PRN    naloxone, 0.02 mg, Intravenous, PRN    ondansetron, 4 mg, Intravenous, Q6H PRN    oxyCODONE, 5 mg, Oral, Q6H PRN    prochlorperazine, 2.5 mg, Intravenous, Q6H PRN    sodium chloride 0.9%, 10 mL, Intravenous, Q12H PRN

## 2024-10-27 NOTE — ASSESSMENT & PLAN NOTE
Patient has Diastolic (HFpEF) heart failure that is Chronic. On presentation their CHF was well compensated. Most recent BNP and echo results are listed below.      Latest ECHO  Results for orders placed during the hospital encounter of 09/03/24    Echo    Interpretation Summary    Left Ventricle: The left ventricle is normal in size. Normal wall thickness. There is normal systolic function with a visually estimated ejection fraction of 60 - 65%. Biplane (2D) method of discs ejection fraction is 56%. Global longitudinal strain is -19.5%. There is normal diastolic function.    Right Ventricle: Normal right ventricular cavity size. Wall thickness is normal. Systolic function is normal.    IVC/SVC: Normal venous pressure at 3 mmHg.    Malignant neoplasm of both ovaries    Baseline echo 9/3/2024  NORM -19.47%  Biplane 56%    Current Heart Failure Medications  hydrALAZINE injection 10 mg, Every 6 hours PRN, Intravenous    Plan  - Monitor strict I&Os and daily weights.    - Place on telemetry  - Cardiology has not been consulted  - The patient's volume status is at their baseline

## 2024-10-28 ENCOUNTER — ANESTHESIA EVENT (OUTPATIENT)
Dept: SURGERY | Facility: HOSPITAL | Age: 69
End: 2024-10-28
Payer: MEDICARE

## 2024-10-28 ENCOUNTER — DOCUMENTATION ONLY (OUTPATIENT)
Dept: PALLIATIVE MEDICINE | Facility: HOSPITAL | Age: 69
End: 2024-10-28
Payer: MEDICARE

## 2024-10-28 ENCOUNTER — ANESTHESIA (OUTPATIENT)
Dept: SURGERY | Facility: HOSPITAL | Age: 69
End: 2024-10-28
Payer: MEDICARE

## 2024-10-28 LAB
ACANTHOCYTES BLD QL SMEAR: PRESENT
ALBUMIN SERPL BCP-MCNC: 3.2 G/DL (ref 3.5–5.2)
ALP SERPL-CCNC: 921 U/L (ref 40–150)
ALT SERPL W/O P-5'-P-CCNC: 291 U/L (ref 10–44)
ANION GAP SERPL CALC-SCNC: 11 MMOL/L (ref 8–16)
ANISOCYTOSIS BLD QL SMEAR: SLIGHT
AST SERPL-CCNC: 862 U/L (ref 10–40)
BASOPHILS # BLD AUTO: 0.04 K/UL (ref 0–0.2)
BASOPHILS NFR BLD: 0.9 % (ref 0–1.9)
BILIRUB SERPL-MCNC: 3.3 MG/DL (ref 0.1–1)
BUN SERPL-MCNC: 59 MG/DL (ref 8–23)
BURR CELLS BLD QL SMEAR: ABNORMAL
CALCIUM SERPL-MCNC: 9.4 MG/DL (ref 8.7–10.5)
CHLORIDE SERPL-SCNC: 111 MMOL/L (ref 95–110)
CO2 SERPL-SCNC: 22 MMOL/L (ref 23–29)
CREAT SERPL-MCNC: 1.9 MG/DL (ref 0.5–1.4)
DACRYOCYTES BLD QL SMEAR: ABNORMAL
DIFFERENTIAL METHOD BLD: ABNORMAL
EOSINOPHIL # BLD AUTO: 0.2 K/UL (ref 0–0.5)
EOSINOPHIL NFR BLD: 5.1 % (ref 0–8)
ERYTHROCYTE [DISTWIDTH] IN BLOOD BY AUTOMATED COUNT: 19.9 % (ref 11.5–14.5)
EST. GFR  (NO RACE VARIABLE): 28 ML/MIN/1.73 M^2
GLUCOSE SERPL-MCNC: 76 MG/DL (ref 70–110)
HCT VFR BLD AUTO: 34.4 % (ref 37–48.5)
HGB BLD-MCNC: 10.8 G/DL (ref 12–16)
IMM GRANULOCYTES # BLD AUTO: 0.02 K/UL (ref 0–0.04)
IMM GRANULOCYTES NFR BLD AUTO: 0.5 % (ref 0–0.5)
LYMPHOCYTES # BLD AUTO: 0.6 K/UL (ref 1–4.8)
LYMPHOCYTES NFR BLD: 14.7 % (ref 18–48)
MAGNESIUM SERPL-MCNC: 2.5 MG/DL (ref 1.6–2.6)
MCH RBC QN AUTO: 27.9 PG (ref 27–31)
MCHC RBC AUTO-ENTMCNC: 31.4 G/DL (ref 32–36)
MCV RBC AUTO: 89 FL (ref 82–98)
MONOCYTES # BLD AUTO: 0.1 K/UL (ref 0.3–1)
MONOCYTES NFR BLD: 3 % (ref 4–15)
NEUTROPHILS # BLD AUTO: 3.3 K/UL (ref 1.8–7.7)
NEUTROPHILS NFR BLD: 75.8 % (ref 38–73)
NRBC BLD-RTO: 0 /100 WBC
OVALOCYTES BLD QL SMEAR: ABNORMAL
PHOSPHATE SERPL-MCNC: 3 MG/DL (ref 2.7–4.5)
PLATELET # BLD AUTO: 143 K/UL (ref 150–450)
PLATELET BLD QL SMEAR: ABNORMAL
PMV BLD AUTO: 10.6 FL (ref 9.2–12.9)
POIKILOCYTOSIS BLD QL SMEAR: ABNORMAL
POTASSIUM SERPL-SCNC: 4.2 MMOL/L (ref 3.5–5.1)
PROT SERPL-MCNC: 5.9 G/DL (ref 6–8.4)
RBC # BLD AUTO: 3.87 M/UL (ref 4–5.4)
SODIUM SERPL-SCNC: 144 MMOL/L (ref 136–145)
TARGETS BLD QL SMEAR: ABNORMAL
WBC # BLD AUTO: 4.3 K/UL (ref 3.9–12.7)

## 2024-10-28 PROCEDURE — 25500020 PHARM REV CODE 255: Mod: HCNC | Performed by: UROLOGY

## 2024-10-28 PROCEDURE — 85025 COMPLETE CBC W/AUTO DIFF WBC: CPT | Mod: HCNC | Performed by: STUDENT IN AN ORGANIZED HEALTH CARE EDUCATION/TRAINING PROGRAM

## 2024-10-28 PROCEDURE — 36000706: Mod: HCNC | Performed by: UROLOGY

## 2024-10-28 PROCEDURE — 80053 COMPREHEN METABOLIC PANEL: CPT | Mod: HCNC | Performed by: STUDENT IN AN ORGANIZED HEALTH CARE EDUCATION/TRAINING PROGRAM

## 2024-10-28 PROCEDURE — C2617 STENT, NON-COR, TEM W/O DEL: HCPCS | Mod: HCNC | Performed by: UROLOGY

## 2024-10-28 PROCEDURE — C1769 GUIDE WIRE: HCPCS | Mod: HCNC | Performed by: UROLOGY

## 2024-10-28 PROCEDURE — 25000003 PHARM REV CODE 250: Mod: HCNC | Performed by: INTERNAL MEDICINE

## 2024-10-28 PROCEDURE — 36000707: Mod: HCNC | Performed by: UROLOGY

## 2024-10-28 PROCEDURE — 83735 ASSAY OF MAGNESIUM: CPT | Mod: HCNC | Performed by: STUDENT IN AN ORGANIZED HEALTH CARE EDUCATION/TRAINING PROGRAM

## 2024-10-28 PROCEDURE — 37000008 HC ANESTHESIA 1ST 15 MINUTES: Mod: HCNC | Performed by: UROLOGY

## 2024-10-28 PROCEDURE — 84100 ASSAY OF PHOSPHORUS: CPT | Mod: HCNC | Performed by: STUDENT IN AN ORGANIZED HEALTH CARE EDUCATION/TRAINING PROGRAM

## 2024-10-28 PROCEDURE — 63600175 PHARM REV CODE 636 W HCPCS: Mod: HCNC | Performed by: INTERNAL MEDICINE

## 2024-10-28 PROCEDURE — 27000221 HC OXYGEN, UP TO 24 HOURS: Mod: HCNC

## 2024-10-28 PROCEDURE — 21400001 HC TELEMETRY ROOM: Mod: HCNC

## 2024-10-28 PROCEDURE — 94761 N-INVAS EAR/PLS OXIMETRY MLT: CPT | Mod: HCNC

## 2024-10-28 PROCEDURE — C1758 CATHETER, URETERAL: HCPCS | Mod: HCNC | Performed by: UROLOGY

## 2024-10-28 PROCEDURE — 71000033 HC RECOVERY, INTIAL HOUR: Mod: HCNC | Performed by: UROLOGY

## 2024-10-28 PROCEDURE — 52332 CYSTOSCOPY AND TREATMENT: CPT | Mod: RT,,, | Performed by: UROLOGY

## 2024-10-28 PROCEDURE — 63600175 PHARM REV CODE 636 W HCPCS: Mod: HCNC | Performed by: NURSE ANESTHETIST, CERTIFIED REGISTERED

## 2024-10-28 PROCEDURE — 37000009 HC ANESTHESIA EA ADD 15 MINS: Mod: HCNC | Performed by: UROLOGY

## 2024-10-28 PROCEDURE — 0T768DZ DILATION OF RIGHT URETER WITH INTRALUMINAL DEVICE, VIA NATURAL OR ARTIFICIAL OPENING ENDOSCOPIC: ICD-10-PCS | Performed by: UROLOGY

## 2024-10-28 PROCEDURE — 63600175 PHARM REV CODE 636 W HCPCS: Mod: HCNC | Performed by: STUDENT IN AN ORGANIZED HEALTH CARE EDUCATION/TRAINING PROGRAM

## 2024-10-28 PROCEDURE — 74420 UROGRAPHY RTRGR +-KUB: CPT | Mod: 26,,, | Performed by: UROLOGY

## 2024-10-28 PROCEDURE — 99900035 HC TECH TIME PER 15 MIN (STAT): Mod: HCNC

## 2024-10-28 PROCEDURE — 25000003 PHARM REV CODE 250: Mod: HCNC | Performed by: STUDENT IN AN ORGANIZED HEALTH CARE EDUCATION/TRAINING PROGRAM

## 2024-10-28 DEVICE — STENT URETERAL UNIV 7FR 24CM: Type: IMPLANTABLE DEVICE | Site: URETER | Status: FUNCTIONAL

## 2024-10-28 RX ORDER — ONDANSETRON HYDROCHLORIDE 2 MG/ML
4 INJECTION, SOLUTION INTRAVENOUS DAILY PRN
Status: DISCONTINUED | OUTPATIENT
Start: 2024-10-28 | End: 2024-10-28 | Stop reason: HOSPADM

## 2024-10-28 RX ORDER — LIDOCAINE HYDROCHLORIDE 20 MG/ML
INJECTION INTRAVENOUS
Status: DISCONTINUED | OUTPATIENT
Start: 2024-10-28 | End: 2024-10-28

## 2024-10-28 RX ORDER — PROPOFOL 10 MG/ML
VIAL (ML) INTRAVENOUS
Status: DISCONTINUED | OUTPATIENT
Start: 2024-10-28 | End: 2024-10-28

## 2024-10-28 RX ORDER — MIDAZOLAM HYDROCHLORIDE 1 MG/ML
INJECTION INTRAMUSCULAR; INTRAVENOUS
Status: DISCONTINUED | OUTPATIENT
Start: 2024-10-28 | End: 2024-10-28

## 2024-10-28 RX ORDER — OXYCODONE AND ACETAMINOPHEN 5; 325 MG/1; MG/1
1 TABLET ORAL
Status: DISCONTINUED | OUTPATIENT
Start: 2024-10-28 | End: 2024-10-28 | Stop reason: HOSPADM

## 2024-10-28 RX ORDER — HYDROMORPHONE HYDROCHLORIDE 1 MG/ML
0.2 INJECTION, SOLUTION INTRAMUSCULAR; INTRAVENOUS; SUBCUTANEOUS EVERY 5 MIN PRN
Status: DISCONTINUED | OUTPATIENT
Start: 2024-10-28 | End: 2024-10-28 | Stop reason: HOSPADM

## 2024-10-28 RX ORDER — CEFAZOLIN SODIUM 1 G/3ML
INJECTION, POWDER, FOR SOLUTION INTRAMUSCULAR; INTRAVENOUS
Status: DISCONTINUED | OUTPATIENT
Start: 2024-10-28 | End: 2024-10-28

## 2024-10-28 RX ADMIN — LIDOCAINE HYDROCHLORIDE 100 MG: 20 INJECTION INTRAVENOUS at 01:10

## 2024-10-28 RX ADMIN — MUPIROCIN: 20 OINTMENT TOPICAL at 09:10

## 2024-10-28 RX ADMIN — ENOXAPARIN SODIUM 40 MG: 40 INJECTION SUBCUTANEOUS at 04:10

## 2024-10-28 RX ADMIN — OXYCODONE HYDROCHLORIDE 5 MG: 5 TABLET ORAL at 04:10

## 2024-10-28 RX ADMIN — MUPIROCIN: 20 OINTMENT TOPICAL at 01:10

## 2024-10-28 RX ADMIN — CEFAZOLIN 2 G: 330 INJECTION, POWDER, FOR SOLUTION INTRAMUSCULAR; INTRAVENOUS at 01:10

## 2024-10-28 RX ADMIN — ONDANSETRON 4 MG: 2 INJECTION INTRAMUSCULAR; INTRAVENOUS at 05:10

## 2024-10-28 RX ADMIN — SODIUM CHLORIDE, SODIUM LACTATE, POTASSIUM CHLORIDE, AND CALCIUM CHLORIDE: .6; .31; .03; .02 INJECTION, SOLUTION INTRAVENOUS at 01:10

## 2024-10-28 RX ADMIN — MIDAZOLAM HYDROCHLORIDE 2 MG: 1 INJECTION, SOLUTION INTRAMUSCULAR; INTRAVENOUS at 01:10

## 2024-10-28 RX ADMIN — PROPOFOL 120 MG: 10 INJECTION, EMULSION INTRAVENOUS at 01:10

## 2024-10-28 RX ADMIN — MUPIROCIN: 20 OINTMENT TOPICAL at 08:10

## 2024-10-28 RX ADMIN — SERTRALINE HYDROCHLORIDE 25 MG: 25 TABLET ORAL at 09:10

## 2024-10-28 NOTE — PROGRESS NOTES
Subjective:   Urology contacted for stent placement as patient Scr continues to be elevated     Objective:     Temp:  [97.3 °F (36.3 °C)-98.4 °F (36.9 °C)] 98.4 °F (36.9 °C)  Pulse:  [] 98  Resp:  [17-20] 17  SpO2:  [90 %-100 %] 100 %  BP: (106-131)/(58-69) 108/69  No intake/output data recorded.      General: awake, alert, cooperative  Head: NC/AT  Ears: external ears normal  Eyes: sclera normal  Lungs: normal inspiration, NAD  Heart: well-perfused  Skin: The skin is warm and dry  Ext: No c/c/e.  Neuro: grossly intact, AOx3      Labs:   Recent Labs   Lab 10/26/24  0540 10/27/24  0622 10/28/24  0508    141 144   K 4.4 4.2 4.2    108 111*   CO2 23 20* 22*   BUN 61* 64* 59*   CREATININE 1.7* 2.0* 1.9*   CALCIUM 8.8 9.3 9.4     Recent Labs   Lab 10/26/24  0540 10/27/24  0622 10/28/24  0508   WBC 4.13 3.50* 4.30   HGB 11.1* 10.1* 10.8*   HCT 35.4* 32.0* 34.4*    164 143*       Assessment/Plan:   Casie Gaines is a 68 y.o. female with right hydronephrosis, patient is now amenable to stent placement, to OR for cysto and stent placement, risks/benefits/alternatives reviewed    Andres Elizondo MD

## 2024-10-28 NOTE — ASSESSMENT & PLAN NOTE
MIRELA is likely due to pre-renal azotemia due to dehydration related to nausea with vomiting, hypotension, inadequate oral intake, and likely also related to progressive metastatic disease. Baseline creatinine is  1.1-1.3 . Most recent creatinine and eGFR are listed below.  Recent Labs     10/26/24  0540 10/27/24  0622 10/28/24  0508   CREATININE 1.7* 2.0* 1.9*   EGFRNORACEVR 32* 27* 28*        Plan  - MIRELA is improving--> patient received albumin 25% 25 g IV x1 dose in the emergency department. Patient is in need of IV fluids for the treatment of MIRELA.  Due to a shortage of IV fluids, patient to receive oral fluids.  Patient must receive this treatment in a hospital location due to the risk of adverse effects, insufficient response to treatment, or other potential complications of disease. We will encourage increased fluid intake, Pedialyte 600 mL p.o. q.4 hours ordered. 10/26- albumin 25% 100g IV x1 administered  - Avoid nephrotoxins and renally dose meds for GFR listed above  - Monitor urine output, serial BMP, and adjust therapy as needed  - Urology evaluation of hydronephrosis on imaging noted kidney obstruction likely secondary from progression of cancer and after discussion with the patient, patient opted for conservative management at the time.  10/27 given concerns for worsening renal function, patient would like re-evaluation for stent placement.  Discussed with Urology, Urology re-evaluation pending.      10/28/24  Urology planning for stent placement today   Creatinine slightly trended down   Follow up on labs in a.m.

## 2024-10-28 NOTE — HPI
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 Pugh from clinic for hypotension and abnormal labs with acute kidney injury and worsening liver enzymes.  Symptoms were acute onset, moderate severity, and with blood pressure improved with albumin administration given in the emergency department.  Patient denies any associated chest pain, increased shortness of breath, cough, fevers, chills.  She does complain of recurrent episodes of nausea with vomiting.  She has chronic abdominal pain but has noted increasing distention to her abdomen as well as increasing lower extremity swelling.  She denies any diarrhea, constipation, dysuria, or hematuria.  Patient is actively receiving chemotherapy with last dose received 10/15/2024.     Last hospital admit 10/2-10/07/2024 for shortness for breath, new bilateral pleural effusion, pneumonia, and images with worsening metastatic disease.  Patient received IV cefepime.     Overview/Hospital Course:  Admitted to Hospital Medicine for evaluation of constitutional symptoms and labs concerning for MIRELA in setting of poor oral intake.  Started on increased fluid hydration.  Heme-Onc consulted and after goals of care discussion, plan to continue with treatment at this time.  Evaluation of lower extremity pain/swelling without concerns for DVT.  Evaluation of transaminitis with hepatitis panel negative, and abdominal ultrasound noting previously seen "severely heterogeneous echotexture throughout the liver with poorly defined " "mass within the left hepatic lobe measuring up to 8.8 cm. Additional smaller hypoechoic lesions seen throughout concerning for metastatic disease. " Negative for gallstones, common bile duct dilation, or Back's sign. Evaluation of transaminitis by Heme-Onc noted likely secondary to hypovolemia with concerns if no improvement could be secondary to advancement of malignancy but less likely given recent chemotherapy resumption. Abdominal ultrasound noted concerns for "Mild to moderate right-sided hydronephrosis", Urology evaluation noted kidney obstruction likely secondary from progression of cancer and after discussion with the patient, patient opted for conservative management at the time.  Discussion with hepatology, Interventional Radiology, Heme-Onc regarding worsened liver function.  Concerns for progression of metastatic disease, goals of care discussions ongoing.  Renal function continued to worsen despite albumin trial.  Goals for care discussion with the patient, patient now amenable to trial of stent placement for potential improvement in kidney function.  Urology re-evaluation pending.  Patient still reports poor appetite, and still having concerns with nausea/vomiting. Still reports abdominal and lower extremity swelling.  Renal function worsened, despite albumin trial.  Extensive discussions with patient regarding concerns for worsening prognosis.  After goals of care discussions, patient would like to continue treatment at this time and would like urology re-evaluation as previously she was offered stent placement but she declined at the time due to preference for conservative measures to see if kidney function would improve.  Discussed with Urology, plans for re-evaluation tomorrow. Concerns for progression of metastatic disease, Heme-Onc following, goals of care discussions ongoing.     10/29/24 - Oncologist recommends hospice. Pt now considering.     "

## 2024-10-28 NOTE — PROGRESS NOTES
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 Pugh from clinic for hypotension and abnormal labs with acute kidney injury and worsening liver enzymes.  Symptoms were acute onset, moderate severity, and with blood pressure improved with albumin administration given in the emergency department.  Patient denies any associated chest pain, increased shortness of breath, cough, fevers, chills.  She does complain of recurrent episodes of nausea with vomiting.  She has chronic abdominal pain but has noted increasing distention to her abdomen as well as increasing lower extremity swelling.  She denies any diarrhea, constipation, dysuria, or hematuria.  Patient is actively receiving chemotherapy with last dose received 10/15/2024.    Last hospital admit 10/2-10/07/2024 for shortness for breath, new bilateral pleural effusion, pneumonia, and images with worsening metastatic disease.  Patient received IV cefepime.    Overview/Hospital Course:  Admitted to Hospital Medicine for evaluation of constitutional symptoms and labs concerning for MIRELA in setting of poor  "oral intake.  Started on increased fluid hydration.  Heme-Onc consulted and after goals of care discussion, plan to continue with treatment at this time.  Evaluation of lower extremity pain/swelling without concerns for DVT.  Evaluation of transaminitis with hepatitis panel negative, and abdominal ultrasound noting previously seen "severely heterogeneous echotexture throughout the liver with poorly defined mass within the left hepatic lobe measuring up to 8.8 cm. Additional smaller hypoechoic lesions seen throughout concerning for metastatic disease. " Negative for gallstones, common bile duct dilation, or Back's sign. Evaluation of transaminitis by Heme-Onc noted likely secondary to hypovolemia with concerns if no improvement could be secondary to advancement of malignancy but less likely given recent chemotherapy resumption. Abdominal ultrasound noted concerns for "Mild to moderate right-sided hydronephrosis", Urology evaluation noted kidney obstruction likely secondary from progression of cancer and after discussion with the patient, patient opted for conservative management at the time.  Discussion with hepatology, Interventional Radiology, Heme-Onc regarding worsened liver function.  Concerns for progression of metastatic disease, goals of care discussions ongoing.  Renal function continued to worsen despite albumin trial.  Goals for care discussion with the patient, patient now amenable to trial of stent placement for potential improvement in kidney function.  On 10/28/2024, Urology planning for stent placement, given patient's age, underlying comorbidities, acute on chronic issues, poor prognosis, discussed extensively regarding goals of care,-patient expressed full code for now, however open for further goals of care discussion, palliative medicine on board.  Consulted palliative Medicine.    Interval History:    No acute events overnight ; alert and oriented x3  NPO since midnight for possible stent " placement per Urology today   Stated experiencing nausea, poor appetite,;   Creatinine slightly trended down, LFTs trending up, patient's mentation stable at this point;     Urology planning for stent placement, given patient's age, underlying comorbidities, acute on chronic issues, poor prognosis, discussed extensively regarding goals of care,-patient expressed full code for now, however open for further goals of care discussion, palliative medicine on board.  Consulted palliative Medicine.          Review of Systems  Objective:     Vital Signs (Most Recent):  Temp: 98.4 °F (36.9 °C) (10/28/24 1112)  Pulse: 98 (10/28/24 1112)  Resp: 17 (10/28/24 1112)  BP: 108/69 (10/28/24 1112)  SpO2: 100 % (10/28/24 1112) Vital Signs (24h Range):  Temp:  [97.3 °F (36.3 °C)-98.4 °F (36.9 °C)] 98.4 °F (36.9 °C)  Pulse:  [] 98  Resp:  [17-20] 17  SpO2:  [90 %-100 %] 100 %  BP: (106-131)/(58-69) 108/69     Weight: 104.6 kg (230 lb 9.6 oz)  Body mass index is 36.12 kg/m².  No intake or output data in the 24 hours ending 10/28/24 1225      Physical Exam      Constitutional:       General: She is not in acute distress.     Appearance: She is obese. She is ill-appearing. She is not toxic-appearing or diaphoretic.   HENT:      Head: Normocephalic and atraumatic.      Mouth/Throat:      Mouth: Mucous membranes are moist.   Eyes:      General: No scleral icterus.        Right eye: No discharge.         Left eye: No discharge.   Cardiovascular:      Rate and Rhythm: Normal rate and regular rhythm.      Heart sounds: Normal heart sounds.   Pulmonary:      Effort: Pulmonary effort is normal. No respiratory distress.      Breath sounds: Normal breath sounds.   Abdominal:      General: Bowel sounds are normal. There is distension.      Palpations: Abdomen is soft.      Tenderness: There is no abdominal tenderness. There is no guarding or rebound.   Musculoskeletal:         General: Swelling present.      Cervical back: No rigidity.       Right lower leg: Edema present.      Left lower leg: Edema present.   Skin:     General: Skin is warm and dry.      Coloration: Skin is not jaundiced.   Neurological:      Mental Status: She is alert and oriented to person, place, and time. Mental status is at baseline.   Psychiatric:         Mood and Affect: Mood normal.         Behavior: Behavior normal.   Significant Labs: All pertinent labs within the past 24 hours have been reviewed.  CBC:   Recent Labs   Lab 10/27/24  0622 10/28/24  0508   WBC 3.50* 4.30   HGB 10.1* 10.8*   HCT 32.0* 34.4*    143*     CMP:   Recent Labs   Lab 10/27/24  0622 10/28/24  0508    144   K 4.2 4.2    111*   CO2 20* 22*   GLU 84 76   BUN 64* 59*   CREATININE 2.0* 1.9*   CALCIUM 9.3 9.4   PROT 6.2 5.9*   ALBUMIN 3.7 3.2*   BILITOT 2.8* 3.3*   ALKPHOS 859* 921*   * 862*   * 291*   ANIONGAP 13 11       Significant Imaging:   Imaging Results              US Abdomen Limited (Final result)  Result time 10/23/24 08:32:31      Final result by Chandrakant Fermin MD (10/23/24 08:32:31)                   Impression:      Severe heterogeneous echotexture throughout the liver with metastatic disease as above.      Electronically signed by: Chandrakant Fermin MD  Date:    10/23/2024  Time:    08:32               Narrative:    EXAMINATION:  US ABDOMEN LIMITED    CLINICAL HISTORY:  worsening elevation in LFTs, MIRELA/CKD3a, abdominal distention, metastatic ovarian cancer;    COMPARISON:  10/05/2024    FINDINGS:  Limited right upper quadrant ultrasound performed.  The liver measures 17.2 cm in length and again demonstrates severely heterogeneous echotexture throughout the liver with poorly defined mass within the left hepatic lobe measuring up to 8.8 cm.  Additional smaller hypoechoic lesions seen throughout concerning for metastatic disease.  Common bile duct is within normal limits at 5 mm..  No gallstones.  Mild wall thickening within the gallbladder which could reflect  intrinsic liver disease.  Negative sonographic Back sign.  The visualized portions of the pancreas and IVC are within normal limits.  The right kidney is normal in length measuring 12 cm. Mild to moderate right-sided hydronephrosis.  No definite renal mass seen.  Spleen is normal in size.                                       X-Ray Chest AP Portable (Final result)  Result time 10/22/24 17:30:24      Final result by Partha Miguel MD (10/22/24 17:30:24)                   Impression:      1.  Similar degree of vascular congestion versus reticular interstitial changes throughout the lungs with patchy ground-glass opacities.  Stable small to moderate left effusion.  New small right effusion.  Differential considerations would include chronic or recurrent CHF versus interstitial infectious process such as atypical viral pneumonia.  Clinical correlation is advised.    2.  Stable findings as noted above.      Electronically signed by: Partha Miguel MD  Date:    10/22/2024  Time:    17:30               Narrative:    EXAMINATION:  XR CHEST AP PORTABLE    CLINICAL HISTORY:  Shortness of breath    COMPARISON:  October 2, 2024    FINDINGS:  EKG leads overlie the chest.  Stable small to moderate left pleural effusion.  New small right pleural effusion.  Similar degree of vascular congestion versus reticular interstitial changes throughout the lungs.  Patchy ground-glass opacities noted as well.  The lungs are free of new pulmonary opacities.  The cardiac silhouette size is on the upper limits of normal.  Stable left-sided chest port.  Tortuous aorta with calcifications of the aortic knob.  There are degenerative changes of the spine and both shoulder girdles.                                       Assessment/Plan:      * Acute renal failure superimposed on stage 3a chronic kidney disease  MIRELA is likely due to pre-renal azotemia due to dehydration related to nausea with vomiting, hypotension, inadequate oral intake, and likely  also related to progressive metastatic disease. Baseline creatinine is  1.1-1.3 . Most recent creatinine and eGFR are listed below.  Recent Labs     10/26/24  0540 10/27/24  0622 10/28/24  0508   CREATININE 1.7* 2.0* 1.9*   EGFRNORACEVR 32* 27* 28*        Plan  - MIRELA is improving--> patient received albumin 25% 25 g IV x1 dose in the emergency department. Patient is in need of IV fluids for the treatment of MIRELA.  Due to a shortage of IV fluids, patient to receive oral fluids.  Patient must receive this treatment in a hospital location due to the risk of adverse effects, insufficient response to treatment, or other potential complications of disease. We will encourage increased fluid intake, Pedialyte 600 mL p.o. q.4 hours ordered. 10/26- albumin 25% 100g IV x1 administered  - Avoid nephrotoxins and renally dose meds for GFR listed above  - Monitor urine output, serial BMP, and adjust therapy as needed  - Urology evaluation of hydronephrosis on imaging noted kidney obstruction likely secondary from progression of cancer and after discussion with the patient, patient opted for conservative management at the time.  10/27 given concerns for worsening renal function, patient would like re-evaluation for stent placement.  Discussed with Urology, Urology re-evaluation pending.      10/28/24  Urology planning for stent placement today   Creatinine slightly trended down   Follow up on labs in a.m.       Moderate malnutrition  Nutrition consulted. Most recent weight and BMI monitored-     Measurements:  Wt Readings from Last 1 Encounters:   10/24/24 104.6 kg (230 lb 9.6 oz)   Body mass index is 36.12 kg/m².    Patient has been screened and assessed by RD.    Malnutrition Type:  Context: chronic illness  Level: moderate    Malnutrition Characteristic Summary:  Weight Loss (Malnutrition): 10% in 6 months  Energy Intake (Malnutrition): less than or equal to 75% for greater than or equal to 1 month  Fluid Accumulation  (Malnutrition): mild    Interventions/Recommendations (treatment strategy):  1. General Healthful Diet  2. Commercial beverage medical food supplement therapy 3. Collaboration by nutrition professional with other providers      Normocytic anemia  Anemia is likely due to chronic disease due to Malignancy and chronic kidney disease. Most recent hemoglobin and hematocrit are listed below.  Recent Labs     10/25/24  0849 10/26/24  0540 10/27/24  0622   HGB 11.9* 11.1* 10.1*   HCT 39.5 35.4* 32.0*       Plan  - Monitor serial CBC: Daily  - Transfuse PRBC if patient becomes hemodynamically unstable, symptomatic or H/H drops below 7/21.  - Patient has not received any PRBC transfusions to date  - Patient's anemia is currently stable      History of DVT (deep vein thrombosis)  -recent left lower extremity venous ultrasound 10/15/2024 was negative for DVT  -bilateral lower extremity edema most likely related to metastatic disease and hypoalbuminemia    10/26-Patient reports she is no longer on anticoagulation after she completed her DVT treatment and has discussed this with Dr. Pugh(Heme-Onc)    PLAN:  -Repeat ultrasound negative for DVT  -DVT prophylaxis with enoxaparin    Chronic diastolic CHF (congestive heart failure)  Patient has Diastolic (HFpEF) heart failure that is Chronic. On presentation their CHF was well compensated. Most recent BNP and echo results are listed below.      Latest ECHO  Results for orders placed during the hospital encounter of 09/03/24    Echo    Interpretation Summary    Left Ventricle: The left ventricle is normal in size. Normal wall thickness. There is normal systolic function with a visually estimated ejection fraction of 60 - 65%. Biplane (2D) method of discs ejection fraction is 56%. Global longitudinal strain is -19.5%. There is normal diastolic function.    Right Ventricle: Normal right ventricular cavity size. Wall thickness is normal. Systolic function is normal.    IVC/SVC: Normal  "venous pressure at 3 mmHg.    Malignant neoplasm of both ovaries    Baseline echo 9/3/2024  NORM -19.47%  Biplane 56%    Current Heart Failure Medications  hydrALAZINE injection 10 mg, Every 6 hours PRN, Intravenous    Plan  - Monitor strict I&Os and daily weights.    - Place on telemetry  - Cardiology has not been consulted  - suspect volume overload likely secondary to 3rd spacing in the setting of failure and hepatic failure    Chronic respiratory failure with hypoxia  Patient with Hypoxic Respiratory failure which is Chronic.  she is on home oxygen at 3-4 LPM.     -CXR this admit with " Similar degree of vascular congestion versus reticular interstitial changes throughout the lungs with patchy ground-glass opacities.  Stable small to moderate left effusion.  New small right effusion.  Differential considerations would include chronic or recurrent CHF versus interstitial infectious process such as atypical viral pneumonia.  Clinical correlation is advised."  -recent CTA of chest 10/02/2024 was reviewed  -initial labs notable for white blood cell count 4.10, lactic acid level 1.0, BNP 27, afebrile  -procalcitonin level elevated, however utility limited given MIRELA    -no antibiotics warranted at this time  -no diuretics warranted at this time  -continuous pulse oximetry monitoring, O2 supplementation, incentive spirometry, and p.r.n. albuterol inhaler    Elevated liver enzymes  Upon admission, alk phos 1018, total bilirubin 1.2, , , INR 1.2, BNP 27, lactic acid level 1.0, creatinine 2.4  -Evaluation of transaminitis with hepatitis panel negative, and abdominal ultrasound noting previously seen "severely heterogeneous echotexture throughout the liver with poorly defined mass within the left hepatic lobe measuring up to 8.8 cm. Additional smaller hypoechoic lesions seen throughout concerning for metastatic disease. " Negative for gallstones, common bile duct dilation, or Back's sign.     Recent Labs     " "10/25/24  0849 10/26/24  0540 10/27/24  0622   * 744* 765*   * 259* 255*       -Evaluation of transaminitis by Heme-Onc noted likely secondary to hypovolemia with concerns if no improvement could be secondary to advancement of malignancy but less likely given recent chemotherapy resumption.      -hold Crestor and avoid hepatotoxic agents  -recent CT scan of abdomen/pelvis and PET scan were reviewed  -10/24/24 Discussion with hepatology, Interventional Radiology, Heme-Onc regarding worsened liver function.  Concerns for progression of metastatic disease, goals of care discussions ongoing.       Hypotension due to hypovolemia  -hypotension improved with IV albumin given in the emergency department  -hold Norvasc, atenolol, Benicar/HCT  -limit sedating agents      Nausea & vomiting  -bland diet, Pedialyte scheduled, IV albumin x1 dose  -p.r.n. Antiemetics        Transaminitis  Recent Labs     10/25/24  0849 10/26/24  0540 10/27/24  0622   * 744* 765*   * 259* 255*           10/24/24 Discussion with hepatology, Interventional Radiology, Heme-Onc regarding worsened liver function.  Concerns for progression of metastatic disease, goals of care discussions ongoing.       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 New moderate-sized bilateral pleural effusions as described above with adjacent atelectasis or consolidation.No enlarged mediastinal and hilar lymph nodes. Intralobular septal thickening and associated groundglass opacity bilaterally felt to represent edema.New irregular soft tissue mass in the left upper quadrant concerning for carcinomatosis."  -CT scan of abdomen and pelvis 10/06/2024 with Widespread metastatic " "disease suspected with abnormal lymph nodes within the retroperitoneum, jovan hepatis, mediastinum as well as multiple abnormal liver lesions and focal thickening within the large bowel. Patient needs tissue sampling of liver lesion. Recommend colonoscopy and possible biopsy of sigmoid lesion."  -nuclear medicine PET scan 10/11/2024 with Overall there has been progression of disease. There is continued hypermetabolic adenopathy within the neck, chest (mediastinum and chest wall).  Some of these areas are stable with some nodes slightly diminished and some decreased degrees of FDG uptake within some of these nodes. However, there is extensive progression of peritoneal carcinomatosis throughout the abdomen and pelvis with increasing number and size of peritoneal hypermetabolic nodularity and increasing amount of ascites.  There is also large amount heterogeneous FDG uptake within the left hepatic lobe concerning for hepatic metastatic disease which is new. there has also been interval development moderate right-sided hydronephrosis and hydroureter with ureter likely being obstructed by peritoneal implants. increasing small to moderate bilateral loculated pleural effusions.  Extensive mixed interstitial and ground-glass infiltrates throughout the lungs.  Question pneumonia/post treatment related changes.  Lymphangitic carcinomatosis could appear similar"      -Heme-Onc consulted and after goals of care discussion, plan to continue with treatment at this time.  Follow up recs.   - Discussion with hepatology, Interventional Radiology, Heme-Onc regarding worsened liver function.  Concerns for progression of metastatic disease, goals of care discussions ongoing.    -p.r.n. pain control    Peritoneal carcinomatosis  See discussion for bilateral ovarian cancer    Hyperlipidemia  -hold Crestor in the setting of worsening LFTs          VTE Risk Mitigation (From admission, onward)           Ordered     enoxaparin injection 40 " mg  Every 24 hours         10/26/24 1612     IP VTE HIGH RISK PATIENT  Once         10/22/24 2032     Place sequential compression device  Until discontinued         10/22/24 2032                    Discharge Planning   EVE:      Code Status: Full Code   Is the patient medically ready for discharge?:     Reason for patient still in hospital (select all that apply): Patient trending condition, Consult recommendations, and Pending disposition  Discharge Plan A: Home with family                  [facility] Seema Abel MD  Department of Hospital Medicine   [facility] ([facility])

## 2024-10-28 NOTE — PLAN OF CARE
Discussed poc with pt, pt verbalized understanding  Purposeful rounding every 2hours  VS wnl  Cardiac monitoring in use, pt is MARGA, tele monitor # [id number]  Fall precautions in place, remains injury free  Pain and nausea under control with PRN meds  IVFs  Accurate I&Os  Abx given as prescribed  Bed locked at lowest position  Call light within reach  Chart check complete  Will cont with POC    Problem: Skin Injury Risk Increased  Goal: Skin Health and Integrity  Outcome: Progressing     Problem: Adult Inpatient Plan of Care  Goal: Plan of Care Review  Outcome: Progressing  Goal: Patient-Specific Goal (Individualized)  Outcome: Progressing  Goal: Absence of Hospital-Acquired Illness or Injury  Outcome: Progressing  Goal: Optimal Comfort and Wellbeing  Outcome: Progressing  Goal: Readiness for Transition of Care  Outcome: Progressing     Problem: Infection  Goal: Absence of Infection Signs and Symptoms  Outcome: Progressing     Problem: Acute Kidney Injury/Impairment  Goal: Fluid and Electrolyte Balance  Outcome: Progressing  Goal: Improved Oral Intake  Outcome: Progressing  Goal: Effective Renal Function  Outcome: Progressing     Problem: Pneumonia  Goal: Fluid Balance  Outcome: Progressing  Goal: Resolution of Infection Signs and Symptoms  Outcome: Progressing  Goal: Effective Oxygenation and Ventilation  Outcome: Progressing     Problem: Coping Ineffective  Goal: Effective Coping  Outcome: Progressing

## 2024-10-28 NOTE — SUBJECTIVE & OBJECTIVE
Interval History:    No acute events overnight ; alert and oriented x3  NPO since midnight for possible stent placement per Urology today   Stated experiencing nausea, poor appetite,;   Creatinine slightly trended down, LFTs trending up, patient's mentation stable at this point;     Urology planning for stent placement, given patient's age, underlying comorbidities, acute on chronic issues, poor prognosis, discussed extensively regarding goals of care,-patient expressed full code for now, however open for further goals of care discussion, palliative medicine on board.  Consulted palliative Medicine.          Review of Systems  Objective:     Vital Signs (Most Recent):  Temp: 98.4 °F (36.9 °C) (10/28/24 1112)  Pulse: 98 (10/28/24 1112)  Resp: 17 (10/28/24 1112)  BP: 108/69 (10/28/24 1112)  SpO2: 100 % (10/28/24 1112) Vital Signs (24h Range):  Temp:  [97.3 °F (36.3 °C)-98.4 °F (36.9 °C)] 98.4 °F (36.9 °C)  Pulse:  [] 98  Resp:  [17-20] 17  SpO2:  [90 %-100 %] 100 %  BP: (106-131)/(58-69) 108/69     Weight: 104.6 kg (230 lb 9.6 oz)  Body mass index is 36.12 kg/m².  No intake or output data in the 24 hours ending 10/28/24 1225      Physical Exam      Constitutional:       General: She is not in acute distress.     Appearance: She is obese. She is ill-appearing. She is not toxic-appearing or diaphoretic.   HENT:      Head: Normocephalic and atraumatic.      Mouth/Throat:      Mouth: Mucous membranes are moist.   Eyes:      General: No scleral icterus.        Right eye: No discharge.         Left eye: No discharge.   Cardiovascular:      Rate and Rhythm: Normal rate and regular rhythm.      Heart sounds: Normal heart sounds.   Pulmonary:      Effort: Pulmonary effort is normal. No respiratory distress.      Breath sounds: Normal breath sounds.   Abdominal:      General: Bowel sounds are normal. There is distension.      Palpations: Abdomen is soft.      Tenderness: There is no abdominal tenderness. There is no  guarding or rebound.   Musculoskeletal:         General: Swelling present.      Cervical back: No rigidity.      Right lower leg: Edema present.      Left lower leg: Edema present.   Skin:     General: Skin is warm and dry.      Coloration: Skin is not jaundiced.   Neurological:      Mental Status: She is alert and oriented to person, place, and time. Mental status is at baseline.   Psychiatric:         Mood and Affect: Mood normal.         Behavior: Behavior normal.   Significant Labs: All pertinent labs within the past 24 hours have been reviewed.  CBC:   Recent Labs   Lab 10/27/24  0622 10/28/24  0508   WBC 3.50* 4.30   HGB 10.1* 10.8*   HCT 32.0* 34.4*    143*     CMP:   Recent Labs   Lab 10/27/24  0622 10/28/24  0508    144   K 4.2 4.2    111*   CO2 20* 22*   GLU 84 76   BUN 64* 59*   CREATININE 2.0* 1.9*   CALCIUM 9.3 9.4   PROT 6.2 5.9*   ALBUMIN 3.7 3.2*   BILITOT 2.8* 3.3*   ALKPHOS 859* 921*   * 862*   * 291*   ANIONGAP 13 11       Significant Imaging:   Imaging Results              US Abdomen Limited (Final result)  Result time 10/23/24 08:32:31      Final result by Chandrakant Fermin MD (10/23/24 08:32:31)                   Impression:      Severe heterogeneous echotexture throughout the liver with metastatic disease as above.      Electronically signed by: Chandrakant Fermin MD  Date:    10/23/2024  Time:    08:32               Narrative:    EXAMINATION:  US ABDOMEN LIMITED    CLINICAL HISTORY:  worsening elevation in LFTs, MIRELA/CKD3a, abdominal distention, metastatic ovarian cancer;    COMPARISON:  10/05/2024    FINDINGS:  Limited right upper quadrant ultrasound performed.  The liver measures 17.2 cm in length and again demonstrates severely heterogeneous echotexture throughout the liver with poorly defined mass within the left hepatic lobe measuring up to 8.8 cm.  Additional smaller hypoechoic lesions seen throughout concerning for metastatic disease.  Common bile duct is  within normal limits at 5 mm..  No gallstones.  Mild wall thickening within the gallbladder which could reflect intrinsic liver disease.  Negative sonographic Back sign.  The visualized portions of the pancreas and IVC are within normal limits.  The right kidney is normal in length measuring 12 cm. Mild to moderate right-sided hydronephrosis.  No definite renal mass seen.  Spleen is normal in size.                                       X-Ray Chest AP Portable (Final result)  Result time 10/22/24 17:30:24      Final result by Partha Miguel MD (10/22/24 17:30:24)                   Impression:      1.  Similar degree of vascular congestion versus reticular interstitial changes throughout the lungs with patchy ground-glass opacities.  Stable small to moderate left effusion.  New small right effusion.  Differential considerations would include chronic or recurrent CHF versus interstitial infectious process such as atypical viral pneumonia.  Clinical correlation is advised.    2.  Stable findings as noted above.      Electronically signed by: Partha Miguel MD  Date:    10/22/2024  Time:    17:30               Narrative:    EXAMINATION:  XR CHEST AP PORTABLE    CLINICAL HISTORY:  Shortness of breath    COMPARISON:  October 2, 2024    FINDINGS:  EKG leads overlie the chest.  Stable small to moderate left pleural effusion.  New small right pleural effusion.  Similar degree of vascular congestion versus reticular interstitial changes throughout the lungs.  Patchy ground-glass opacities noted as well.  The lungs are free of new pulmonary opacities.  The cardiac silhouette size is on the upper limits of normal.  Stable left-sided chest port.  Tortuous aorta with calcifications of the aortic knob.  There are degenerative changes of the spine and both shoulder girdles.

## 2024-10-28 NOTE — SUBJECTIVE & OBJECTIVE
Interval History: Examined in bed with multiple family at bedside. Today dyspnea at rest and with speech.      Past Medical History:   Diagnosis Date    Anemia     Anxiety     Arthritis     knees    Cancer     ovarian    CHF (congestive heart failure)     Hx antineoplastic chemotherapy     last 2019    Hyperlipemia     Hypertension     Neck pain     Ovarian cancer 2019    CHEMO    Peritoneal carcinomatosis 2019       Past Surgical History:   Procedure Laterality Date    breast reduction  10/02/2018    CATARACT EXTRACTION Bilateral     2023     SECTION      X 1    COLONOSCOPY N/A 2021    Procedure: COLONOSCOPY;  Surgeon: Paulette Rojas MD;  Location: [facility] ENDO;  Service: Gastroenterology;  Laterality: N/A;    CYSTOSCOPY W/ URETERAL STENT PLACEMENT Right 2019    Procedure: CYSTOSCOPY, WITH URETERAL STENT INSERTION;  Surgeon: Timmy Santiago IV, MD;  Location: [facility] OR;  Service: Urology;  Laterality: Right;    CYSTOSCOPY W/ URETERAL STENT PLACEMENT Right 10/28/2024    Procedure: CYSTOSCOPY, WITH URETERAL STENT INSERTION;  Surgeon: Andres Elizondo MD;  Location: [facility] OR;  Service: Urology;  Laterality: Right;    CYSTOSCOPY W/ URETERAL STENT REMOVAL  10/04/2019    DILATION AND CURETTAGE OF UTERUS      HYSTERECTOMY      RALH for fibroids (still has ovaries)    INSERTION OF VENOUS ACCESS PORT Left 2019    Procedure: INSERTION, VENOUS ACCESS PORT;  Surgeon: Ulisses Monzon MD;  Location: [facility] OR;  Service: General;  Laterality: Left;  Left internal jugular     LYSIS OF ADHESIONS OF URETER N/A 08/15/2019    Procedure: URETEROLYSIS;  Surgeon: Ismael Juarez MD;  Location: [facility] OR;  Service: OB/GYN;  Laterality: N/A;    OMENTECTOMY N/A 08/15/2019    Procedure: OMENTECTOMY;  Surgeon: Ismael Juarez MD;  Location: [facility] OR;  Service: OB/GYN;  Laterality: N/A;    OOPHORECTOMY      RETROGRADE PYELOGRAPHY Right 2019    Procedure: PYELOGRAM, RETROGRADE;  Surgeon: Timmy KELLEY  Pamela NAM MD;  Location: [facility] OR;  Service: Urology;  Laterality: Right;    ROBOT-ASSISTED LAPAROSCOPIC SALPINGO-OOPHORECTOMY USING DA TIA XI Bilateral 08/15/2019    Procedure: XI ROBOTIC SALPINGO-OOPHORECTOMY;  Surgeon: Ismael Juarez MD;  Location: [facility] OR;  Service: OB/GYN;  Laterality: Bilateral;    TOTAL REDUCTION MAMMOPLASTY  2018    TUBAL LIGATION         Review of patient's allergies indicates:  No Known Allergies    Medications:  Continuous Infusions:   dextrose 5% lactated ringers   Intravenous Continuous 75 mL/hr at 10/30/24 0118 New Bag at 10/30/24 0118     Scheduled Meds:   electrolytes-dextrose  600 mL Oral Q4H    enoxparin  30 mg Subcutaneous Q24H (prophylaxis, 1700)    sertraline  25 mg Oral Daily     PRN Meds:  Current Facility-Administered Medications:     albuterol sulfate, 2.5 mg, Nebulization, Q4H PRN    ALPRAZolam, 0.25 mg, Oral, TID PRN    alteplase, 2 mg, Intra-Catheter, PRN    dextrose 10%, 12.5 g, Intravenous, PRN    dextrose 10%, 25 g, Intravenous, PRN    fluticasone propionate, 1 spray, Each Nostril, Q12H PRN    glucagon (human recombinant), 1 mg, Intramuscular, PRN    glucose, 16 g, Oral, PRN    glucose, 24 g, Oral, PRN    hydrALAZINE, 10 mg, Intravenous, Q6H PRN    naloxone, 0.02 mg, Intravenous, PRN    ondansetron, 4 mg, Intravenous, Q6H PRN    oxyCODONE, 5 mg, Oral, Q6H PRN    prochlorperazine, 2.5 mg, Intravenous, Q6H PRN    sodium chloride 0.9%, 10 mL, Intravenous, Q12H PRN    Family History       Problem Relation (Age of Onset)    Anesthesia problems Other    Breast cancer Maternal Aunt, Paternal Aunt    Colon cancer Brother    Glaucoma Mother    No Known Problems Father    Ovarian cancer Paternal Aunt          Tobacco Use    Smoking status: Former     Current packs/day: 0.00     Average packs/day: 1 pack/day for 25.0 years (25.0 ttl pk-yrs)     Types: Cigarettes     Start date: 10/1/1993     Quit date: 10/1/2018     Years since quittin.0    Smokeless tobacco: Never     Tobacco comments:     States started quit 2 months ago after 30 years   Substance and Sexual Activity    Alcohol use: Yes     Comment: occasionally  No alcohol 72h prior to sx    Drug use: No    Sexual activity: Not Currently     Partners: Male     Comment: hyst; mut monog       Review of Systems  Objective:     Vital Signs (Most Recent):  Temp: 97.6 °F (36.4 °C) (10/30/24 0732)  Pulse: 88 (10/30/24 0928)  Resp: 16 (10/30/24 0732)  BP: (!) 91/53 (10/30/24 0732)  SpO2: 100 % (10/30/24 0819) Vital Signs (24h Range):  Temp:  [97.5 °F (36.4 °C)-98.1 °F (36.7 °C)] 97.6 °F (36.4 °C)  Pulse:  [87-96] 88  Resp:  [16-18] 16  SpO2:  [90 %-100 %] 100 %  BP: ()/(53-70) 91/53     Weight: 104.6 kg (230 lb 9.6 oz)  Body mass index is 36.12 kg/m².       Physical Exam  Constitutional:       Appearance: She is ill-appearing.   HENT:      Mouth/Throat:      Mouth: Mucous membranes are dry.   Eyes:      General: No scleral icterus.  Pulmonary:      Comments: Mild accessory muscle use intermittently at rest. Dyspnea with speech  Abdominal:      General: There is no distension.      Tenderness: There is no abdominal tenderness.   Neurological:      Mental Status: She is alert.   Psychiatric:         Mood and Affect: Mood normal.         Behavior: Behavior normal.         Thought Content: Thought content normal.         Judgment: Judgment normal.            Review of Symptoms      Symptom Assessment (ESAS 0-10 Scale)  Pain:  0  Dyspnea:  8  Anxiety:  4  Nausea:  0  Depression:  0  Anorexia:  0  Fatigue:  0  Insomnia:  0  Restlessness:  0  Agitation:  0         ECOG Performance Status thGthrthathdtheth:th th4th Living Arrangements:  Lives with family and Lives with spouse    Psychosocial/Cultural:   See Palliative Psychosocial Note: Yes  **Primary  to Follow**  Palliative Care  Consult: No        Advance Care Planning   Advance Directives:   LaPOST: No (will defer to hospice)    Do Not Resuscitate Status: Yes    Medical  Power of : Yes (completing today)    Agent's Name:  Jed Frias   Agent's Contact Number:  [phone]    Decision Making:  Patient answered questions  Goals of Care: The patient endorses that what is most important right now is to focus on spending time at home and symptom/pain control    Accordingly, we have decided that the best plan to meet the patient's goals includes enrolling in hospice care.         Significant Labs: All pertinent labs within the past 24 hours have been reviewed.  CBC:   Recent Labs   Lab 10/30/24  0439   WBC 3.40*   HGB 10.5*   HCT 32.5*   MCV 87   PLT 84*     BMP:  Recent Labs   Lab 10/30/24  0439   *      K 4.4      CO2 24   BUN 69*   CREATININE 2.7*   CALCIUM 8.8   MG 2.5     LFT:  Lab Results   Component Value Date     (H) 10/30/2024    ALKPHOS 967 (H) 10/30/2024    BILITOT 3.5 (H) 10/30/2024     Albumin:   Albumin   Date Value Ref Range Status   10/30/2024 2.9 (L) 3.5 - 5.2 g/dL Final     Protein:   Total Protein   Date Value Ref Range Status   10/30/2024 5.7 (L) 6.0 - 8.4 g/dL Final     Lactic acid:   Lab Results   Component Value Date    LACTATE 0.9 10/22/2024    LACTATE 1.0 10/22/2024       Significant Imaging: I have reviewed all pertinent imaging results/findings within the past 24 hours.  10/23/24 Abd U/S  Limited right upper quadrant ultrasound performed.  The liver measures 17.2 cm in length and again demonstrates severely heterogeneous echotexture throughout the liver with poorly defined mass within the left hepatic lobe measuring up to 8.8 cm.  Additional smaller hypoechoic lesions seen throughout concerning for metastatic disease.  Common bile duct is within normal limits at 5 mm..  No gallstones.  Mild wall thickening within the gallbladder which could reflect intrinsic liver disease.  Negative sonographic Back sign.  The visualized portions of the pancreas and IVC are within normal limits.  The right kidney is normal in length  measuring 12 cm. Mild to moderate right-sided hydronephrosis.  No definite renal mass seen.  Spleen is normal in size.     Impression:     Severe heterogeneous echotexture throughout the liver with metastatic disease as above.

## 2024-10-28 NOTE — PLAN OF CARE
Discussed poc with pt, pt verbalized understanding  Purposeful rounding every 2hours  Cardiac monitoring in use, pt is MARGA, tele monitor # [id number]____  Fall precautions in place, remains injury free                                                nausea under control with PRN meds  Bed locked at lowest position  Call light within reach  Chart check complete  Will cont with POC

## 2024-10-28 NOTE — OP NOTE
[facility]    Date: 10/28/2024    Pre-Op Diagnosis: Right hydronephrosis    Op Diagnosis: same    Procedure(s) Performed:    1. Cystoscopy with right ureteral JJ stent placement  2. Fluoroscopy < 1 h    Specimen(s): none    Surgeon: Andres Elizondo MD    Anesthesia: general LMA    Indications: Casie Gaines is a 68 y.o. female with right hydronephrosis due to carcinomatosis.    Findings:  Right seven [place] by 24 cm JJ ureteral stent placed with strings removed    Estimated Blood Loss: min    Drains:  7 [place] x 24 cm right JJ ureteral stent without strings    Procedure in Detail:  After risks, benefits and possible complications of the procedure were explained, the patient elected to undergo the procedure and informed consent was obtained. All questions were answered in the royer-operative area. The patient was transferred to the cystoscopy suite and placed on the fluoroscopy table in the supine position.  SCDs were applied and working. Time out was performed, royer-procedural antibiotics were given. Anesthesia was administered.  After adequate anesthesia the patient was placed in dorsal lithotomy position and prepped and draped in the usual sterile fashion.     A rigid cystoscope in a 22 Fr sheath was introduced into the patients bladder per urethra. This passed easily.  The entire urethra was visualized and revealed no strictures or masses.  Cystoscopy was performed which showed the right and left ureteral orifices in the normal anatomic position.  There were no bladder tumors, no  trabeculations, and no stones.      Our attention was turned to the patient's right ureteral orifice.  A five [place] open-ended Arcade catheter was inserted through the scope and the right ureteral orifice was cannulated.  A small amount of contrast was instilled to delineate the collecting system.  A motion wire was advanced up the right ureteral orifice to the level of the expected renal pelvis.  This was confirmed  using fluoroscopy.     We then passed a 7 Fr x 24 cm JJ ureteral stent without strings over the wire to the level of the renal pelvis under direct vision as well as flouroscopy. The guide wire was removed.  A 180 degree coil was observed in the renal pelvis as well as the bladder using fluoroscopy.  A 180 degree coil was also seen using direct visualization in the bladder.     The patient tolerated the procedure well and was transferred to the recovery room in stable condition.      Disposition: The patient will be returned to the floor for further observation.      Andres Elizondo MD

## 2024-10-29 ENCOUNTER — TELEPHONE (OUTPATIENT)
Dept: HEMATOLOGY/ONCOLOGY | Facility: CLINIC | Age: 69
End: 2024-10-29
Payer: MEDICARE

## 2024-10-29 PROBLEM — E43 SEVERE PROTEIN-CALORIE MALNUTRITION: Status: ACTIVE | Noted: 2024-10-25

## 2024-10-29 LAB
ALBUMIN SERPL BCP-MCNC: 3.2 G/DL (ref 3.5–5.2)
ALP SERPL-CCNC: 942 U/L (ref 40–150)
ALT SERPL W/O P-5'-P-CCNC: 251 U/L (ref 10–44)
ANION GAP SERPL CALC-SCNC: 10 MMOL/L (ref 8–16)
ANION GAP SERPL CALC-SCNC: 14 MMOL/L (ref 8–16)
ANISOCYTOSIS BLD QL SMEAR: SLIGHT
AST SERPL-CCNC: 820 U/L (ref 10–40)
BASOPHILS # BLD AUTO: 0.03 K/UL (ref 0–0.2)
BASOPHILS NFR BLD: 0.7 % (ref 0–1.9)
BILIRUB SERPL-MCNC: 3.4 MG/DL (ref 0.1–1)
BUN SERPL-MCNC: 66 MG/DL (ref 8–23)
BUN SERPL-MCNC: 66 MG/DL (ref 8–23)
BURR CELLS BLD QL SMEAR: ABNORMAL
CALCIUM SERPL-MCNC: 9 MG/DL (ref 8.7–10.5)
CALCIUM SERPL-MCNC: 9.2 MG/DL (ref 8.7–10.5)
CHLORIDE SERPL-SCNC: 109 MMOL/L (ref 95–110)
CHLORIDE SERPL-SCNC: 110 MMOL/L (ref 95–110)
CO2 SERPL-SCNC: 21 MMOL/L (ref 23–29)
CO2 SERPL-SCNC: 23 MMOL/L (ref 23–29)
CREAT SERPL-MCNC: 2.6 MG/DL (ref 0.5–1.4)
CREAT SERPL-MCNC: 2.6 MG/DL (ref 0.5–1.4)
DACRYOCYTES BLD QL SMEAR: ABNORMAL
DIFFERENTIAL METHOD BLD: ABNORMAL
EOSINOPHIL # BLD AUTO: 0 K/UL (ref 0–0.5)
EOSINOPHIL NFR BLD: 0.9 % (ref 0–8)
ERYTHROCYTE [DISTWIDTH] IN BLOOD BY AUTOMATED COUNT: 20.5 % (ref 11.5–14.5)
EST. GFR  (NO RACE VARIABLE): 19 ML/MIN/1.73 M^2
EST. GFR  (NO RACE VARIABLE): 19 ML/MIN/1.73 M^2
GLUCOSE SERPL-MCNC: 82 MG/DL (ref 70–110)
GLUCOSE SERPL-MCNC: 86 MG/DL (ref 70–110)
HCT VFR BLD AUTO: 33.9 % (ref 37–48.5)
HGB BLD-MCNC: 10.8 G/DL (ref 12–16)
HYPOCHROMIA BLD QL SMEAR: ABNORMAL
IMM GRANULOCYTES # BLD AUTO: 0.02 K/UL (ref 0–0.04)
IMM GRANULOCYTES NFR BLD AUTO: 0.5 % (ref 0–0.5)
LYMPHOCYTES # BLD AUTO: 0.7 K/UL (ref 1–4.8)
LYMPHOCYTES NFR BLD: 15.3 % (ref 18–48)
MAGNESIUM SERPL-MCNC: 2.6 MG/DL (ref 1.6–2.6)
MCH RBC QN AUTO: 28.1 PG (ref 27–31)
MCHC RBC AUTO-ENTMCNC: 31.9 G/DL (ref 32–36)
MCV RBC AUTO: 88 FL (ref 82–98)
MONOCYTES # BLD AUTO: 0.1 K/UL (ref 0.3–1)
MONOCYTES NFR BLD: 3 % (ref 4–15)
NEUTROPHILS # BLD AUTO: 3.4 K/UL (ref 1.8–7.7)
NEUTROPHILS NFR BLD: 79.6 % (ref 38–73)
NRBC BLD-RTO: 0 /100 WBC
OVALOCYTES BLD QL SMEAR: ABNORMAL
PHOSPHATE SERPL-MCNC: 4 MG/DL (ref 2.7–4.5)
PLATELET # BLD AUTO: 114 K/UL (ref 150–450)
PLATELET BLD QL SMEAR: ABNORMAL
PMV BLD AUTO: 11.4 FL (ref 9.2–12.9)
POCT GLUCOSE: 101 MG/DL (ref 70–110)
POCT GLUCOSE: 93 MG/DL (ref 70–110)
POIKILOCYTOSIS BLD QL SMEAR: SLIGHT
POTASSIUM SERPL-SCNC: 4.5 MMOL/L (ref 3.5–5.1)
POTASSIUM SERPL-SCNC: 4.7 MMOL/L (ref 3.5–5.1)
PROT SERPL-MCNC: 6 G/DL (ref 6–8.4)
RBC # BLD AUTO: 3.85 M/UL (ref 4–5.4)
SCHISTOCYTES BLD QL SMEAR: PRESENT
SODIUM SERPL-SCNC: 142 MMOL/L (ref 136–145)
SODIUM SERPL-SCNC: 145 MMOL/L (ref 136–145)
WBC # BLD AUTO: 4.3 K/UL (ref 3.9–12.7)

## 2024-10-29 PROCEDURE — 99233 SBSQ HOSP IP/OBS HIGH 50: CPT | Mod: HCNC,,, | Performed by: UROLOGY

## 2024-10-29 PROCEDURE — 80048 BASIC METABOLIC PNL TOTAL CA: CPT | Mod: HCNC,XB | Performed by: STUDENT IN AN ORGANIZED HEALTH CARE EDUCATION/TRAINING PROGRAM

## 2024-10-29 PROCEDURE — 80053 COMPREHEN METABOLIC PANEL: CPT | Mod: HCNC | Performed by: STUDENT IN AN ORGANIZED HEALTH CARE EDUCATION/TRAINING PROGRAM

## 2024-10-29 PROCEDURE — 84100 ASSAY OF PHOSPHORUS: CPT | Mod: HCNC | Performed by: STUDENT IN AN ORGANIZED HEALTH CARE EDUCATION/TRAINING PROGRAM

## 2024-10-29 PROCEDURE — 27000221 HC OXYGEN, UP TO 24 HOURS: Mod: HCNC

## 2024-10-29 PROCEDURE — 21400001 HC TELEMETRY ROOM: Mod: HCNC

## 2024-10-29 PROCEDURE — 63600175 PHARM REV CODE 636 W HCPCS: Mod: HCNC | Performed by: STUDENT IN AN ORGANIZED HEALTH CARE EDUCATION/TRAINING PROGRAM

## 2024-10-29 PROCEDURE — 94761 N-INVAS EAR/PLS OXIMETRY MLT: CPT | Mod: HCNC

## 2024-10-29 PROCEDURE — 99900035 HC TECH TIME PER 15 MIN (STAT): Mod: HCNC

## 2024-10-29 PROCEDURE — 85025 COMPLETE CBC W/AUTO DIFF WBC: CPT | Mod: HCNC | Performed by: STUDENT IN AN ORGANIZED HEALTH CARE EDUCATION/TRAINING PROGRAM

## 2024-10-29 PROCEDURE — 25000003 PHARM REV CODE 250: Mod: HCNC | Performed by: INTERNAL MEDICINE

## 2024-10-29 PROCEDURE — 83735 ASSAY OF MAGNESIUM: CPT | Mod: HCNC | Performed by: STUDENT IN AN ORGANIZED HEALTH CARE EDUCATION/TRAINING PROGRAM

## 2024-10-29 RX ORDER — DEXTROSE, SODIUM CHLORIDE, SODIUM LACTATE, POTASSIUM CHLORIDE, AND CALCIUM CHLORIDE 5; .6; .31; .03; .02 G/100ML; G/100ML; G/100ML; G/100ML; G/100ML
INJECTION, SOLUTION INTRAVENOUS CONTINUOUS
Status: DISCONTINUED | OUTPATIENT
Start: 2024-10-29 | End: 2024-10-30 | Stop reason: HOSPADM

## 2024-10-29 RX ORDER — ENOXAPARIN SODIUM 100 MG/ML
30 INJECTION SUBCUTANEOUS EVERY 24 HOURS
Status: DISCONTINUED | OUTPATIENT
Start: 2024-10-30 | End: 2024-10-30 | Stop reason: HOSPADM

## 2024-10-29 RX ADMIN — SODIUM CHLORIDE, SODIUM LACTATE, POTASSIUM CHLORIDE, CALCIUM CHLORIDE AND DEXTROSE MONOHYDRATE: 5; 600; 310; 30; 20 INJECTION, SOLUTION INTRAVENOUS at 11:10

## 2024-10-29 RX ADMIN — SERTRALINE HYDROCHLORIDE 25 MG: 25 TABLET ORAL at 09:10

## 2024-10-29 RX ADMIN — ENOXAPARIN SODIUM 40 MG: 40 INJECTION SUBCUTANEOUS at 05:10

## 2024-10-29 RX ADMIN — OXYCODONE HYDROCHLORIDE 5 MG: 5 TABLET ORAL at 09:10

## 2024-10-29 NOTE — PROGRESS NOTES
Subjective:   NAEO, has some pressure from the stent but not too bad     Objective:     Temp:  [97.4 °F (36.3 °C)-98.4 °F (36.9 °C)] 97.8 °F (36.6 °C)  Pulse:  [] 95  Resp:  [16-21] 18  SpO2:  [81 %-100 %] 95 %  BP: (100-134)/(56-75) 108/57  I/O last 3 completed shifts:  In: -   Out: 400 [Urine:400]  Date 10/29/24 0700 - 10/30/24 0659   Shift 6396-2517 0087-0203 1187-4576 24 Hour Total   INTAKE   Shift Total(mL/kg)       OUTPUT   Urine(mL/kg/hr) 0   0   Shift Total(mL/kg) 0(0)   0(0)   Weight (kg) 104.6 104.6 104.6 104.6       General: awake, alert, cooperative  Head: NC/AT  Ears: external ears normal  Eyes: sclera normal  Lungs: normal inspiration, NAD  Heart: well-perfused  Skin: The skin is warm and dry  Ext: No c/c/e.  Neuro: grossly intact, AOx3      Labs:   Recent Labs   Lab 10/27/24  0622 10/28/24  0508 10/29/24  0433    144 145   K 4.2 4.2 4.7    111* 110   CO2 20* 22* 21*   BUN 64* 59* 66*   CREATININE 2.0* 1.9* 2.6*   CALCIUM 9.3 9.4 9.2     Recent Labs   Lab 10/27/24  0622 10/28/24  0508 10/29/24  0433   WBC 3.50* 4.30 4.30   HGB 10.1* 10.8* 10.8*   HCT 32.0* 34.4* 33.9*    143* 114*       Assessment/Plan:   Casie Gaines is a 68 y.o. female with right hydro, s/p cysto/stent, doing ok.    - continue to trend Scr  - call with further questions or concerns    Andres Elizondo MD

## 2024-10-29 NOTE — PLAN OF CARE
Discussed poc with pt, pt verbalized understanding    Purposeful rounding every 2hours    VS wnl  Cardiac monitoring in use, pt is NSR, tele monitor # [id number]  Blood glucose monitoring   Fall precautions in place, remains injury free  Pt denies c/o any nausea or vomiting at this time and patient will continue to be monitored.  Pain and nausea under control with PRN meds    IVFs  Accurate I&Os  Abx given as prescribed  Bed locked at lowest position  Call light within reach    Chart check complete  Will cont with POC

## 2024-10-29 NOTE — SUBJECTIVE & OBJECTIVE
Interval History:     No acute events overnight   Complaining of abdominal fullness, decreased appetite   Status post stent placement yesterday per Urology  Creatinine trending up   LFTs still elevated   Ordered IV fluids due to poor p.o. intake   Planning for family meeting this afternoon to discuss on goals of care    Review of Systems  Objective:     Vital Signs (Most Recent):  Temp: 97.8 °F (36.6 °C) (10/29/24 0744)  Pulse: 95 (10/29/24 0824)  Resp: 18 (10/29/24 0943)  BP: (!) 108/57 (10/29/24 0744)  SpO2: 95 % (10/29/24 0824) Vital Signs (24h Range):  Temp:  [97.4 °F (36.3 °C)-98.4 °F (36.9 °C)] 97.8 °F (36.6 °C)  Pulse:  [] 95  Resp:  [16-21] 18  SpO2:  [81 %-100 %] 95 %  BP: (100-134)/(56-75) 108/57     Weight: 104.6 kg (230 lb 9.6 oz)  Body mass index is 36.12 kg/m².    Intake/Output Summary (Last 24 hours) at 10/29/2024 1104  Last data filed at 10/29/2024 1050  Gross per 24 hour   Intake --   Output 500 ml   Net -500 ml         Physical Exam      Constitutional:       General: She is not in acute distress.     Appearance: She is obese. She is ill-appearing. She is not toxic-appearing or diaphoretic.   HENT:      Head: Normocephalic and atraumatic.      Mouth/Throat:      Mouth: Mucous membranes are moist.   Eyes:      General: No scleral icterus.        Right eye: No discharge.         Left eye: No discharge.   Cardiovascular:      Rate and Rhythm: Normal rate and regular rhythm.      Heart sounds: Normal heart sounds.   Pulmonary:      Effort: Pulmonary effort is normal. No respiratory distress.      Breath sounds: Normal breath sounds.   Abdominal:      General: Bowel sounds are normal. There is distension.      Palpations: Abdomen is soft.      Tenderness: There is no abdominal tenderness. There is no guarding or rebound.   Musculoskeletal:         General: Swelling present.      Cervical back: No rigidity.      Right lower leg: Edema present.      Left lower leg: Edema present.   Skin:      General: Skin is warm and dry.      Coloration: Skin is not jaundiced.   Neurological:      Mental Status: She is alert and oriented to person, place, and time. Mental status is at baseline.   Psychiatric:         Mood and Affect: Mood normal.         Behavior: Behavior normal.   Significant Labs: All pertinent labs within the past 24 hours have been reviewed.  CBC:   Recent Labs   Lab 10/28/24  0508 10/29/24  0433   WBC 4.30 4.30   HGB 10.8* 10.8*   HCT 34.4* 33.9*   * 114*     CMP:   Recent Labs   Lab 10/28/24  0508 10/29/24  0433    145   K 4.2 4.7   * 110   CO2 22* 21*   GLU 76 82   BUN 59* 66*   CREATININE 1.9* 2.6*   CALCIUM 9.4 9.2   PROT 5.9* 6.0   ALBUMIN 3.2* 3.2*   BILITOT 3.3* 3.4*   ALKPHOS 921* 942*   * 820*   * 251*   ANIONGAP 11 14       Significant Imaging:     Imaging Results              US Abdomen Limited (Final result)  Result time 10/23/24 08:32:31      Final result by Chandrakant Fermin MD (10/23/24 08:32:31)                   Impression:      Severe heterogeneous echotexture throughout the liver with metastatic disease as above.      Electronically signed by: Chandrakant Fermin MD  Date:    10/23/2024  Time:    08:32               Narrative:    EXAMINATION:  US ABDOMEN LIMITED    CLINICAL HISTORY:  worsening elevation in LFTs, MIRELA/CKD3a, abdominal distention, metastatic ovarian cancer;    COMPARISON:  10/05/2024    FINDINGS:  Limited right upper quadrant ultrasound performed.  The liver measures 17.2 cm in length and again demonstrates severely heterogeneous echotexture throughout the liver with poorly defined mass within the left hepatic lobe measuring up to 8.8 cm.  Additional smaller hypoechoic lesions seen throughout concerning for metastatic disease.  Common bile duct is within normal limits at 5 mm..  No gallstones.  Mild wall thickening within the gallbladder which could reflect intrinsic liver disease.  Negative sonographic Back sign.  The visualized  portions of the pancreas and IVC are within normal limits.  The right kidney is normal in length measuring 12 cm. Mild to moderate right-sided hydronephrosis.  No definite renal mass seen.  Spleen is normal in size.                                       X-Ray Chest AP Portable (Final result)  Result time 10/22/24 17:30:24      Final result by Partha Miguel MD (10/22/24 17:30:24)                   Impression:      1.  Similar degree of vascular congestion versus reticular interstitial changes throughout the lungs with patchy ground-glass opacities.  Stable small to moderate left effusion.  New small right effusion.  Differential considerations would include chronic or recurrent CHF versus interstitial infectious process such as atypical viral pneumonia.  Clinical correlation is advised.    2.  Stable findings as noted above.      Electronically signed by: Partha Miguel MD  Date:    10/22/2024  Time:    17:30               Narrative:    EXAMINATION:  XR CHEST AP PORTABLE    CLINICAL HISTORY:  Shortness of breath    COMPARISON:  October 2, 2024    FINDINGS:  EKG leads overlie the chest.  Stable small to moderate left pleural effusion.  New small right pleural effusion.  Similar degree of vascular congestion versus reticular interstitial changes throughout the lungs.  Patchy ground-glass opacities noted as well.  The lungs are free of new pulmonary opacities.  The cardiac silhouette size is on the upper limits of normal.  Stable left-sided chest port.  Tortuous aorta with calcifications of the aortic knob.  There are degenerative changes of the spine and both shoulder girdles.

## 2024-10-29 NOTE — PLAN OF CARE
Discussed poc with pt, pt verbalized understanding    Purposeful rounding every 2hours    VS wnl  Cardiac monitoring in use, pt is NSR, tele monitor # [id number]  Blood glucose monitoring   Fall precautions in place, remains injury free  Pt denies c/o any pain or discomfort at this time and patient will continue to be monitored.  Pain and nausea under control with PRN meds    IVFs  Accurate I&Os  Abx given as prescribed  Bed locked at lowest position  Call light within reach    Chart check complete  Will cont with POC

## 2024-10-29 NOTE — PROGRESS NOTES
[facility] - Med Surg  Adult Nutrition  Progress Note    SUMMARY       Recommendations    1. When medically appropriate, recommend advance pt's diet to a bland diet, texture per SLP recommendations   2. Recommend to continue antiemetics as warranted  3. Encourage PO intake, recommend feeding assistance   4. If PO intake >25% x 3 days or unable to advance diet by day 5, recommend re-consult RD for alternative means of nutrition   5. Weigh twice weekly     Goals:   1. Pt's diet will be advanced and to safest diet texture prior to RD follow up   2. Pt will tolerate and intake >75% EEN and EPN prior to RD follow up  3. Pt's nausea will be improved prior to RD follow up   4. If warranted, RD will be re-consulted within 3-5 days   Nutrition Goal Status: continues, new  Communication of RD Recs: other (comment) (POC)    Assessment and Plan    Endocrine  Severe protein-calorie malnutrition  Malnutrition Type:  Context: acute on chronic illness  Level: severe    Related to (etiology):   Physiological causes increasing nutrient needs d/t illness  Alteration in GI tract structure/function    Signs and Symptoms (as evidenced by):   Unintentional weight loss, adults, of >7.5% in 3 months  Inc loss of subcutaneous fat  Inc muscle loss  Inc localized and generalized fluid accumulation  Unable or unwilling to eat sufficient protein/energy to maintain a healthy weight  Food avoidance and/or lack of interest in food    Malnutrition Characteristic Summary:  Weight Loss (Malnutrition): greater than 7.5% in 3 months  Energy Intake (Malnutrition): less than or equal to 50% for greater than or equal to 5 days  Subcutaneous Fat (Malnutrition): mild depletion  Muscle Mass (Malnutrition): moderate depletion  Fluid Accumulation (Malnutrition): moderate    Interventions (treatment strategy):  1. Manage composition of oral intake, texture modified diet    2. Management of nutrition related prescription medication   3.Feeding assistance management     4 Collaboration by nutrition professional with other providers    Nutrition Diagnosis Status:   New         Malnutrition Assessment (10/29/24):  Malnutrition Context: acute illness or injury, chronic illness  Malnutrition Level: severe  Skin (Micronutrient): wounds unhealed, edema (Maynor score = 18 (mild risk)  Musculoskeletal/Lower Extremities: edema   Micronutrient Evaluation Summary: suspected deficiency   Weight Loss (Malnutrition): greater than 7.5% in 3 months  Energy Intake (Malnutrition): less than or equal to 50% for greater than or equal to 5 days  Subcutaneous Fat (Malnutrition): mild depletion  Muscle Mass (Malnutrition): moderate depletion  Fluid Accumulation (Malnutrition): moderate   Orbital Region (Subcutaneous Fat Loss): moderate depletion (Slightly darker circles; Somewhat hallow look)  Upper Arm Region (Subcutaneous Fat Loss): mild depletion (Some depth pinch but not ample)   K[place] Region (Muscle Loss): moderate depletion (Slight depression)  Dorsal Hand (Muscle Loss): moderate depletion (Slightly depressed)  Patellar Region (Muscle Loss): moderate depletion (Knee cap less prominent, more rounded)                 Reason for Assessment    Reason For Assessment: identified at risk by screening criteria    Diagnosis: cancer diagnosis/related complications, renal disease  Patient Active Problem List   Diagnosis    Hypertension    Osteoarthritis of both knees    Hyperlipidemia    Peritoneal carcinomatosis    Morbid obesity with BMI of 40.0-44.9, adult    Status post placement of ureteral stent    Abnormal ECG    Dyspnea on exertion    Pulmonary HTN    Ovarian cancer, bilateral    S/P BSO (bilateral salpingo-oophorectomy)    Encounter for antineoplastic chemotherapy    Anxiety    DDD (degenerative disc disease), cervical    Family history of colon cancer    Acute right-sided thoracic back pain    Acute right-sided low back pain without sciatica    Sciatic nerve pain    Vasovagal reaction    Drug  reaction    Infusion reaction    Left groin pain    Pruritus    Decreased functional mobility and endurance    Decreased range of motion of lumbar spine    Proximal muscle weakness    Sleep-disordered breathing    Chemotherapy-induced peripheral neuropathy    JERALD (obstructive sleep apnea)    CAP (community acquired pneumonia)    Hypoxia    Transaminitis    Chronic kidney disease, stage 3a    Acute renal failure superimposed on stage 3a chronic kidney disease    Nausea & vomiting    Hypotension due to hypovolemia    Elevated liver enzymes    Chronic respiratory failure with hypoxia    Chronic diastolic CHF (congestive heart failure)    History of DVT (deep vein thrombosis)    Normocytic anemia    Severe protein-calorie malnutrition     Past Medical History:   Diagnosis Date    Anemia     Anxiety     Arthritis     knees    Cancer     ovarian    CHF (congestive heart failure)     Hx antineoplastic chemotherapy     last 6/2019    Hyperlipemia     Hypertension     Neck pain     Ovarian cancer 2019    CHEMO    Peritoneal carcinomatosis 01/26/2019     General Information Comments:   10/25/2024: Patient is on a regular oral diet.  Patient admitted with liver injury and MIRELA on CKD due to dehydration and po intake.  Patient with diagnosis of ovarian cancer on chemotherapy.  Patient has been experiencing nausea, vomiting, abdominal discomfort.  2-3+ mild to moderate edema in BLE.  Weight loss from 116kg within 6 months.  Labs reviewed.  YAMILEFA.  LBM: 10/24/2024.  RD to continue to encourage po intake of oral diet and supplements along with monitor tolerance.    Follow up:  10/29/24: RD follow up. Pt currently on a Clear liquid diet. EMR noted s/p stent placement on 10/28/2024 for right hydro, pt c/o abd fullness and decreased appetite, IV fluids d/t poor PO intake, family meeting planned for this afternoon to discuss GOC, MIRELA improving. Visited pt at bedside, pt resting in bed, pt family members present. Pt reported if she  "drinks anything she is full, reported being full already off a few ice chips, confirmed nausea w/ improvement from meds, pt denied N/V or abd pain, pt stated she has difficulty swallowing d/t tube was recently removed. Pt reported decreased PO intake x 1 week, stated she "can't digest food", pt denied ONS d/t gives her diarrhea, pt reported her UBW is 262 lbs. NFPE was performed, mild/moderate malnutrition noted. Reviewed chart: LBM 10/28; Skin: WDL, vagina wound WNL; Maynor score: 18 (mild risk); Edema: 3+ moderate bilateral foot/ankle/leg, generalized. Labs, meds, weight reviewed. Note D5 LR @ 75 mL/hr, PRN: ondansetron, oxycodone. Weight charted 24 248 lbs, 10/22/24 227 lbs, -21 lb wt loss (8% wt change) x 3 months, note most recent weight charted 10/24/24 230 lbs (BMI 36.12, Obese), + 3 lb wt gain x 2 days, edema noted, updated weight for accuracy warranted. RD will continue to follow and monitor pt's nutritional status during admit.     Nutrition Discharge Planning: Cardiac diet + daily MTV or Dependent on medical treatment     Nutrition Risk Screen    Nutrition Risk Screen: no indicators present  Nutrition Related Social Determinants of Health: SDOH: Adequate food in home environment and None Identified    Nutrition/Diet History    Spiritual, Cultural Beliefs, Adventism Practices, Values that Affect Care: no  Food Allergies: NKFA  Factors Affecting Nutritional Intake: nausea/vomiting, decreased appetite    Anthropometrics    Temp: 98.3 °F (36.8 °C)  Height Method: Stated  Height: 5' 7" (170.2 cm)  Height (inches): 67 in  Weight Method: Bed Scale  Weight: 104.6 kg (230 lb 9.6 oz)  Weight (lb): 230.6 lb  Ideal Body Weight (IBW), Female: 135 lb  % Ideal Body Weight, Female (lb): 170.81 %  BMI (Calculated): 36.1  BMI Grade: 35 - 39.9 - obesity - grade II  Weight Loss: unintentional  Usual Body Weight (UBW), k kg (within 6 months)  % Usual Body Weight: 90.36  % Weight Change From Usual Weight: -9.83 %   "   Wt Readings from Last 15 Encounters:   10/24/24 104.6 kg (230 lb 9.6 oz)   10/22/24 103.2 kg (227 lb 8.2 oz)   10/15/24 106.8 kg (235 lb 7.2 oz)   10/04/24 104.8 kg (231 lb 0.7 oz)   09/20/24 105.3 kg (232 lb 4.1 oz)   09/06/24 108.6 kg (239 lb 5 oz)   09/03/24 111.1 kg (245 lb)   08/22/24 111.1 kg (245 lb)   08/19/24 111.3 kg (245 lb 6 oz)   08/19/24 111.3 kg (245 lb 6 oz)   08/01/24 112.5 kg (248 lb 0.3 oz)   07/18/24 112.5 kg (248 lb)   07/05/24 111.8 kg (246 lb 9.4 oz)   06/03/24 114.3 kg (252 lb)   05/28/24 113.8 kg (250 lb 14.1 oz)     Lab/Procedures/Meds    Pertinent Labs Reviewed: reviewed  BMP  Lab Results   Component Value Date     10/29/2024    K 4.5 10/29/2024     10/29/2024    CO2 23 10/29/2024    BUN 66 (H) 10/29/2024    CREATININE 2.6 (H) 10/29/2024    CALCIUM 9.0 10/29/2024    ANIONGAP 10 10/29/2024    EGFRNORACEVR 19 (A) 10/29/2024     Lab Results   Component Value Date    CALCIUM 9.0 10/29/2024    PHOS 4.0 10/29/2024     Lab Results   Component Value Date    ALBUMIN 3.2 (L) 10/29/2024     Lab Results   Component Value Date     (H) 10/29/2024     (H) 10/29/2024    ALKPHOS 942 (H) 10/29/2024    BILITOT 3.4 (H) 10/29/2024     Recent Labs   Lab 10/29/24  1119   POCTGLUCOSE 93     Lab Results   Component Value Date    HGBA1C 5.8 (H) 06/12/2023       Glucose   Date Value Ref Range Status   10/29/2024 86 70 - 110 mg/dL Final     Lab Results   Component Value Date    WBC 4.30 10/29/2024    HGB 10.8 (L) 10/29/2024    HCT 33.9 (L) 10/29/2024    MCV 88 10/29/2024     (L) 10/29/2024     Pertinent Medications Reviewed: reviewed  Scheduled Meds:   electrolytes-dextrose  600 mL Oral Q4H    enoxparin  40 mg Subcutaneous Q24H (prophylaxis, 1700)    sertraline  25 mg Oral Daily     Continuous Infusions:   dextrose 5% lactated ringers   Intravenous Continuous 75 mL/hr at 10/29/24 1153 New Bag at 10/29/24 1153     PRN Meds:.  Current Facility-Administered Medications:     albuterol  sulfate, 2.5 mg, Nebulization, Q4H PRN    ALPRAZolam, 0.25 mg, Oral, TID PRN    alteplase, 2 mg, Intra-Catheter, PRN    dextrose 10%, 12.5 g, Intravenous, PRN    dextrose 10%, 25 g, Intravenous, PRN    fluticasone propionate, 1 spray, Each Nostril, Q12H PRN    glucagon (human recombinant), 1 mg, Intramuscular, PRN    glucose, 16 g, Oral, PRN    glucose, 24 g, Oral, PRN    hydrALAZINE, 10 mg, Intravenous, Q6H PRN    naloxone, 0.02 mg, Intravenous, PRN    ondansetron, 4 mg, Intravenous, Q6H PRN    oxyCODONE, 5 mg, Oral, Q6H PRN    prochlorperazine, 2.5 mg, Intravenous, Q6H PRN    sodium chloride 0.9%, 10 mL, Intravenous, Q12H PRN    Physical Findings/Assessment         Estimated/Assessed Needs    Weight Used For Calorie Calculations: 72.1 kg (158 lb 15.2 oz) (Adj BW)  Energy Calorie Requirements (kcal): 2728-1726 kcals (MSJ x 1.2-1.4 Adj BW (GI Disorder, obese vs Cancer vs MIRELA on CKD vs CHF)  Energy Need Method: [place]-[employer] [place]  Protein Requirements:  g (0.8-1.0 g/kg ABW (MIRELA, no dislysis vs GI Disorder, older adult vs Obese vs Cancer vs CHF)  Weight Used For Protein Calculations: 104.6 kg (230 lb 9.6 oz)  Fluid Requirements (mL): 500 mL + total output (MIRELA)  Estimated Fluid Requirement Method: other (see comments)  RDA Method (mL): 1554  CHO Requirement: 194-224 g (3067-9138 kcals/8)      Nutrition Prescription Ordered    Current Diet Order: Clear liquid diet  Oral Nutrition Supplement: vanilla Boost plus TID    Evaluation of Received Nutrient/Fluid Intake  I/O: (Net since admit):  10/24/24: +2095 mL  10/29/24: +1775 mL    Energy Calories Required: not meeting needs  Protein Required: not meeting needs  Fluid Required: not meeting needs  Total Fluid Input (mL); none  Total Fluid Output (mL): 400  Tolerance: not tolerating  % Intake of Estimated Energy Needs: 0 - 25 %  % Meal Intake: 0 - 25 %    Nutrition Risk    Level of Risk/Frequency of Follow-up: high (F/u x 2 weekly)     Monitor and Evaluation    Food and  Nutrient Intake: energy intake  Food and Nutrient Adminstration: diet order  Physical Activity and Function: nutrition-related ADLs and IADLs, factors affecting access to physical activity  Anthropometric Measurements: height/length, weight, weight change, body mass index  Biochemical Data, Medical Tests and Procedures: electrolyte and renal panel, gastrointestinal profile, glucose/endocrine profile, inflammatory profile, lipid profile     Nutrition Follow-Up    RD Follow-up?: Yes  NESS Carlos, RDN, LDN

## 2024-10-29 NOTE — PLAN OF CARE
Discussed poc with pt, pt verbalized understanding  Purposeful rounding every 2hours  Cardiac monitoring in use, pt is NSR, tele monitor # _[id number]  Fall precautions in place, remains injury free                                                        nausea under control with PRN meds  Bed locked at lowest position  Call light within reach  Chart check complete  Will cont with POC

## 2024-10-29 NOTE — PROGRESS NOTES
[facility] Surg  Hematology/Oncology  Progress Note    Patient Name: Casie Gaines  Admission Date: 10/22/2024  Hospital Length of Stay: 7 days  Code Status: Full Code     Subjective:   The patient location is:  hospital room  Visit type: Virtual visit with synchronous audio and video  Face-to-face or time spent with patient on the encounter:25 min  Total time spent on and for  this encounter which includes non face-to-face time preparing to see patient, review of tests, obtaining and or reviewing separately obtained records documenting clinical information in the electronic or other health records, independently interpreting results which is not separately reported ,and communicating results to the patient/family/caregiver and in care coordination and treatment planning/communicating with pharmacy for prescriptions/addressing social needs/arranging follow-up and or referrals :35 min    Each patient I provide medical services by telemedicine is:  (1) informed of the relationship between the physician and patient and the respective role of any other health care provider with respect to management of the patient; and (2) notified that he or she may decline to receive medical services by telemedicine and may withdraw from such care at any time.  This is a video visit therefore some elements of the physical exam such as vital signs, heart sounds are breath sounds are not included and may be included if found in recent clinic notes of other providers assessing same patient. Any symptoms or signs that were visualized were stated by the patient may be included in this note.     Interval History:  Patient is status quo. family in room  carcinomatosis diagnosed January 20, 2019 right ureteral obstruction with stent  Patient admitted with significant fatigue nausea vomiting sinus drip found to be hypotensive in Heme-Onc Clinic with evidence of acute kidney injury and worsening liver enzymes  Patient is admitted  hydrated.  Hepatitis panel negative fully defined mass in left hepatic lobe measuring 8.8 cm noted on imaging studies  Oncology Treatment Plan:   OP GYN bevacizumab liposomal DOXOrubicin Q4W  Previous Treatment:    - 02/2019 - 07/2019: Carboplatin, paclitaxel and Avastin x 6 cycles  - 08/15/2019:  salpingo-oophorectomy, ureterolysis/Omentectomy with complete pathologic response  - 10/2019 - 9/2020 Avastin maintenance   - Carboplatin and Paclitaxel (09/29/2023 - 02/14/2024)  Oncology Treatment Plan:   OP GYN bevacizumab liposomal DOXOrubicin Q4W        Oncology Treatment Plan:   OP GYN bevacizumab liposomal DOXOrubicin Q4W    Medications:  Continuous Infusions:   dextrose 5% lactated ringers   Intravenous Continuous 75 mL/hr at 10/29/24 1153 New Bag at 10/29/24 1153     Scheduled Meds:   electrolytes-dextrose  600 mL Oral Q4H    enoxparin  40 mg Subcutaneous Q24H (prophylaxis, 1700)    sertraline  25 mg Oral Daily     PRN Meds:  Current Facility-Administered Medications:     albuterol sulfate, 2.5 mg, Nebulization, Q4H PRN    ALPRAZolam, 0.25 mg, Oral, TID PRN    alteplase, 2 mg, Intra-Catheter, PRN    dextrose 10%, 12.5 g, Intravenous, PRN    dextrose 10%, 25 g, Intravenous, PRN    fluticasone propionate, 1 spray, Each Nostril, Q12H PRN    glucagon (human recombinant), 1 mg, Intramuscular, PRN    glucose, 16 g, Oral, PRN    glucose, 24 g, Oral, PRN    hydrALAZINE, 10 mg, Intravenous, Q6H PRN    naloxone, 0.02 mg, Intravenous, PRN    ondansetron, 4 mg, Intravenous, Q6H PRN    oxyCODONE, 5 mg, Oral, Q6H PRN    prochlorperazine, 2.5 mg, Intravenous, Q6H PRN    sodium chloride 0.9%, 10 mL, Intravenous, Q12H PRN     Review of Systems    Review of Systems  activity change, appetite change, chills, fever and unexpected weight change.   HENT:  Positive for congestion and trouble swallowing. Negative for nosebleeds.    Respiratory:  Negative for shortness of breath.    Gastrointestinal:  Positive for abdominal distention and  nausea. Negative for constipation, diarrhea and vomiting.   Musculoskeletal:  Negative for back pain.   Skin:  Negative for rash.   Neurological:  Positive for numbness. Negative for dizziness, weakness, light-headedness and headaches.   Hematological:  Does not bruise/bleed easily.   Psychiatric/Behavioral:  The patient is not nervous/anxious  Objective:     Vital Signs (Most Recent):  Temp: 98.3 °F (36.8 °C) (10/29/24 1219)  Pulse: 95 (10/29/24 1400)  Resp: 16 (10/29/24 1219)  BP: (!) 101/58 (10/29/24 1219)  SpO2: (!) 93 % (10/29/24 1219) Vital Signs (24h Range):  Temp:  [97.4 °F (36.3 °C)-98.3 °F (36.8 °C)] 98.3 °F (36.8 °C)  Pulse:  [] 95  Resp:  [16-18] 16  SpO2:  [90 %-100 %] 93 %  BP: (100-134)/(56-75) 101/58     Weight: 104.6 kg (230 lb 9.6 oz)  Body mass index is 36.12 kg/m².  Body surface area is 2.22 meters squared.      Intake/Output Summary (Last 24 hours) at 10/29/2024 1504  Last data filed at 10/29/2024 1429  Gross per 24 hour   Intake --   Output 700 ml   Net -700 ml       Physical Exam  Constitutional:       General: She is in acute distress.      Appearance: Normal appearance.   HENT:      Head: Normocephalic and atraumatic.   Neurological:      Mental Status: She is alert.     VITAL SIGNS:  as above  HEENT:  Showed no congestion. Trachea is central no obvious icterus or pallor noted no hoarseness. no obvious JVD   NECK:  Supple.  No JVD. No obvious cervical submental or supraclavicular adenopathy.  RS:the visualized portion of  Chest expands well. chest appears symmetric, no audible wheezes.  No dyspnea recognized  ABDOMEN:  abdomen appears undistended.  EXTREMITIES:  Without edema.  NEUROLOGICAL:  The patient is appropriate, higher functions are normal.  No  obvious neurological deficits.  normal judgement normal thought content  No confusion, no speech impediment. Cranial nerves are intact and show no deficit. No gross motor deficits noted   SKIN MUSCULOSKELETAL: no joint or skeletal  deformity, no clubbing of nails.  No visible rash ecchymosis or petechiae     Significant Labs:   Lab Results   Component Value Date    WBC 4.30 10/29/2024    HGB 10.8 (L) 10/29/2024    HCT 33.9 (L) 10/29/2024    MCV 88 10/29/2024     (L) 10/29/2024      CMP  Sodium   Date Value Ref Range Status   10/29/2024 142 136 - 145 mmol/L Final     Potassium   Date Value Ref Range Status   10/29/2024 4.5 3.5 - 5.1 mmol/L Final     Chloride   Date Value Ref Range Status   10/29/2024 109 95 - 110 mmol/L Final     CO2   Date Value Ref Range Status   10/29/2024 23 23 - 29 mmol/L Final     Glucose   Date Value Ref Range Status   10/29/2024 86 70 - 110 mg/dL Final     BUN   Date Value Ref Range Status   10/29/2024 66 (H) 8 - 23 mg/dL Final     Creatinine   Date Value Ref Range Status   10/29/2024 2.6 (H) 0.5 - 1.4 mg/dL Final     Calcium   Date Value Ref Range Status   10/29/2024 9.0 8.7 - 10.5 mg/dL Final     Total Protein   Date Value Ref Range Status   10/29/2024 6.0 6.0 - 8.4 g/dL Final     Albumin   Date Value Ref Range Status   10/29/2024 3.2 (L) 3.5 - 5.2 g/dL Final     Total Bilirubin   Date Value Ref Range Status   10/29/2024 3.4 (H) 0.1 - 1.0 mg/dL Final     Comment:     For infants and newborns, interpretation of results should be based  on gestational age, weight and in agreement with clinical  observations.    Premature Infant recommended reference ranges:  Up to 24 hours.............<8.0 mg/dL  Up to 48 hours............<12.0 mg/dL  3-5 days..................<15.0 mg/dL  6-29 days.................<15.0 mg/dL       Alkaline Phosphatase   Date Value Ref Range Status   10/29/2024 942 (H) 40 - 150 U/L Final     AST   Date Value Ref Range Status   10/29/2024 820 (H) 10 - 40 U/L Final     ALT   Date Value Ref Range Status   10/29/2024 251 (H) 10 - 44 U/L Final     Anion Gap   Date Value Ref Range Status   10/29/2024 10 8 - 16 mmol/L Final     eGFR   Date Value Ref Range Status   10/29/2024 19 (A) >60 mL/min/1.73 m^2  Final      Impression:     Overall there has been progression of disease.     -There is continued hypermetabolic adenopathy within the neck, chest (mediastinum and chest wall).  Some of these areas are stable with some nodes slightly diminished and some decreased degrees of FDG uptake within some of these nodes.     -However, there is extensive progression of peritoneal carcinomatosis throughout the abdomen and pelvis with increasing number and size of peritoneal hypermetabolic nodularity and increasing amount of ascites.  There is also large amount heterogeneous FDG uptake within the left hepatic lobe concerning for hepatic metastatic disease which is new.     -there has also been interval development moderate right-sided hydronephrosis and hydroureter with ureter likely being obstructed by peritoneal implants.     -increasing small to moderate bilateral loculated pleural effusions.  Extensive mixed interstitial and ground-glass infiltrates throughout the lungs.  Question pneumonia/post treatment related changes.  Lymphangitic carcinomatosis could appear similar    Assessment/Plan:     Active Diagnoses:    Diagnosis Date Noted POA    PRINCIPAL PROBLEM:  Acute renal failure superimposed on stage 3a chronic kidney disease [N17.9, N18.31] 10/22/2024 Yes    Severe protein-calorie malnutrition [E43] 10/25/2024 Unknown    Nausea & vomiting [R11.2] 10/22/2024 Yes    Hypotension due to hypovolemia [E86.1] 10/22/2024 Yes    Elevated liver enzymes [R74.8] 10/22/2024 Yes    Chronic respiratory failure with hypoxia [J96.11] 10/22/2024 Yes    Chronic diastolic CHF (congestive heart failure) [I50.32] 10/22/2024 Yes    History of DVT (deep vein thrombosis) [Z86.718] 10/22/2024 Not Applicable    Normocytic anemia [D64.9] 10/22/2024 Yes    Transaminitis [R74.01] 10/03/2024 Yes    JERALD (obstructive sleep apnea) [G47.33] 08/19/2024 Yes    Ovarian cancer, bilateral [C56.3] 08/15/2019 Yes    Peritoneal carcinomatosis [C78.6] 01/26/2019  Yes    Hyperlipidemia [E78.5] 02/01/2018 Yes      Problems Resolved During this Admission:     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  Patient started doxorubicin/Avastin September 6, 2024  Presented in Gyn/Onc tumor board 07/31/24 with consensus for FRa testing and proceed with mirvetuximab if positive and if negative proceed with Doxil   Hospice discussions have been taking place also discuss with hospitalist today do not see numbers improving so far. Family and I have discuissed and DR House has also talked to pt and family regarding hospice     AK IA on CKD patient being hydrated.  Patient has right-sided hydronephrosis and hydroureter with ureter likely being an obstructed by peritoneal Mets     Elevated LFTs: Probably secondary to dehydration metastatic disease continue to monitor     Hypotension.  Improving now holding all antihypertensives  Thank you for your consult         Jen Fairbanks MD  Hematology/Oncology  [facility]

## 2024-10-29 NOTE — PROGRESS NOTES
[facility] Surg  Hematology/Oncology  Progress Note    Patient Name: Casie Gaines  Admission Date: 10/22/2024  Hospital Length of Stay: 6 days  Code Status: Full Code     Subjective:   The patient location is:  Hospital bed 545  Visit type: Virtual visit with synchronous audio and video  Face-to-face or time spent with patient on the encounter:25 min  Total time spent on and for  this encounter which includes non face-to-face time preparing to see patient, review of tests, obtaining and or reviewing separately obtained records documenting clinical information in the electronic or other health records, independently interpreting results which is not separately reported ,and communicating results to the patient/family/caregiver and in care coordination and treatment planning/communicating with pharmacy for prescriptions/addressing social needs/arranging follow-up and or referrals :30 min    Each patient I provide medical services by telemedicine is:  (1) informed of the relationship between the physician and patient and the respective role of any other health care provider with respect to management of the patient; and (2) notified that he or she may decline to receive medical services by telemedicine and may withdraw from such care at any time.  This is a video visit therefore some elements of the physical exam such as vital signs, heart sounds are breath sounds are not included and may be included if found in recent clinic notes of other providers assessing same patient. Any symptoms or signs that were visualized were stated by the patient may be included in this note.     Interval History:  Patient appears frail  stage IV serous ovarian carcinoma with peritoneal carcinomatosis diagnosed January 20, 2019 right ureteral obstruction with stent  Patient admitted with significant fatigue nausea vomiting sinus drip found to be hypotensive in Heme-Onc Clinic with evidence of acute kidney injury and worsening  liver enzymes  Patient is admitted hydrated.  Hepatitis panel negative fully defined mass in left hepatic lobe measuring 8.8 cm noted on imaging studies  Oncology Treatment Plan:   OP GYN bevacizumab liposomal DOXOrubicin Q4W  Previous Treatment:    - 02/2019 - 07/2019: Carboplatin, paclitaxel and Avastin x 6 cycles  - 08/15/2019:  salpingo-oophorectomy, ureterolysis/Omentectomy with complete pathologic response  - 10/2019 - 9/2020 Avastin maintenance   - Carboplatin and Paclitaxel (09/29/2023 - 02/14/2024)  Oncology Treatment Plan:   OP GYN bevacizumab liposomal DOXOrubicin Q4W        Medications:  Continuous Infusions:  Scheduled Meds:   electrolytes-dextrose  600 mL Oral Q4H    enoxparin  40 mg Subcutaneous Q24H (prophylaxis, 1700)    sertraline  25 mg Oral Daily     PRN Meds:  Current Facility-Administered Medications:     albuterol sulfate, 2.5 mg, Nebulization, Q4H PRN    ALPRAZolam, 0.25 mg, Oral, TID PRN    alteplase, 2 mg, Intra-Catheter, PRN    dextrose 10%, 12.5 g, Intravenous, PRN    dextrose 10%, 25 g, Intravenous, PRN    fluticasone propionate, 1 spray, Each Nostril, Q12H PRN    glucagon (human recombinant), 1 mg, Intramuscular, PRN    glucose, 16 g, Oral, PRN    glucose, 24 g, Oral, PRN    hydrALAZINE, 10 mg, Intravenous, Q6H PRN    naloxone, 0.02 mg, Intravenous, PRN    ondansetron, 4 mg, Intravenous, Q6H PRN    oxyCODONE, 5 mg, Oral, Q6H PRN    prochlorperazine, 2.5 mg, Intravenous, Q6H PRN    sodium chloride 0.9%, 10 mL, Intravenous, Q12H PRN     Review of Systems    Review of Systems  activity change, appetite change, chills, fever and unexpected weight change.   HENT:  Positive for congestion and trouble swallowing. Negative for nosebleeds.    Respiratory:  Negative for shortness of breath.    Gastrointestinal:  Positive for abdominal distention and nausea. Negative for constipation, diarrhea and vomiting.   Musculoskeletal:  Negative for back pain.   Skin:  Negative for rash.   Neurological:   Positive for numbness. Negative for dizziness, weakness, light-headedness and headaches.   Hematological:  Does not bruise/bleed easily.   Psychiatric/Behavioral:  The patient is not nervous/anxious  Objective:     Vital Signs (Most Recent):  Temp: 98.1 °F (36.7 °C) (10/1955)  Pulse: 94 (10/28/24 2037)  Resp: 16 (10/28/24 2037)  BP: 100/75 (10/1955)  SpO2: (!) 94 % (10/28/24 2037) Vital Signs (24h Range):  Temp:  [97.4 °F (36.3 °C)-98.4 °F (36.9 °C)] 98.1 °F (36.7 °C)  Pulse:  [] 94  Resp:  [16-21] 16  SpO2:  [81 %-100 %] 94 %  BP: (100-134)/(58-75) 100/75     Weight: 104.6 kg (230 lb 9.6 oz)  Body mass index is 36.12 kg/m².  Body surface area is 2.22 meters squared.      Intake/Output Summary (Last 24 hours) at 10/28/2024 2045  Last data filed at 10/28/2024 1832  Gross per 24 hour   Intake --   Output 100 ml   Net -100 ml       Physical Exam  VITAL SIGNS:  as above   GENERAL: appears well-built, well-nourished.  No anxiety, no agitation, and in no distress.  Patient is awake, alert, oriented and cooperative.  HEENT:  Showed no congestion. Trachea is central no obvious icterus or pallor noted no hoarseness. no obvious JVD   NECK:  Supple.  No JVD. No obvious cervical submental or supraclavicular adenopathy.  RS:the visualized portion of  Chest expands well. chest appears symmetric, no audible wheezes.  No dyspnea recognized  ABDOMEN:  abdomen appears undistended.  EXTREMITIES:  Without edema.  NEUROLOGICAL:  The patient is appropriate, higher functions are normal.  No  obvious neurological deficits.  normal judgement normal thought content  No confusion, no speech impediment. Cranial nerves are intact and show no deficit. No gross motor deficits noted   SKIN MUSCULOSKELETAL: no joint or skeletal deformity, no clubbing of nails.  No visible rash ecchymosis or petechiae   Significant Labs:   Lab Results   Component Value Date    WBC 4.30 10/28/2024    HGB 10.8 (L) 10/28/2024    HCT 34.4 (L)  10/28/2024    MCV 89 10/28/2024     (L) 10/28/2024      CMP  Sodium   Date Value Ref Range Status   10/28/2024 144 136 - 145 mmol/L Final     Potassium   Date Value Ref Range Status   10/28/2024 4.2 3.5 - 5.1 mmol/L Final     Chloride   Date Value Ref Range Status   10/28/2024 111 (H) 95 - 110 mmol/L Final     CO2   Date Value Ref Range Status   10/28/2024 22 (L) 23 - 29 mmol/L Final     Glucose   Date Value Ref Range Status   10/28/2024 76 70 - 110 mg/dL Final     BUN   Date Value Ref Range Status   10/28/2024 59 (H) 8 - 23 mg/dL Final     Creatinine   Date Value Ref Range Status   10/28/2024 1.9 (H) 0.5 - 1.4 mg/dL Final     Calcium   Date Value Ref Range Status   10/28/2024 9.4 8.7 - 10.5 mg/dL Final     Total Protein   Date Value Ref Range Status   10/28/2024 5.9 (L) 6.0 - 8.4 g/dL Final     Albumin   Date Value Ref Range Status   10/28/2024 3.2 (L) 3.5 - 5.2 g/dL Final     Total Bilirubin   Date Value Ref Range Status   10/28/2024 3.3 (H) 0.1 - 1.0 mg/dL Final     Comment:     For infants and newborns, interpretation of results should be based  on gestational age, weight and in agreement with clinical  observations.    Premature Infant recommended reference ranges:  Up to 24 hours.............<8.0 mg/dL  Up to 48 hours............<12.0 mg/dL  3-5 days..................<15.0 mg/dL  6-29 days.................<15.0 mg/dL       Alkaline Phosphatase   Date Value Ref Range Status   10/28/2024 921 (H) 40 - 150 U/L Final     AST   Date Value Ref Range Status   10/28/2024 862 (H) 10 - 40 U/L Final     ALT   Date Value Ref Range Status   10/28/2024 291 (H) 10 - 44 U/L Final     Anion Gap   Date Value Ref Range Status   10/28/2024 11 8 - 16 mmol/L Final     eGFR   Date Value Ref Range Status   10/28/2024 28 (A) >60 mL/min/1.73 m^2 Final        Impression:     Overall there has been progression of disease.     -There is continued hypermetabolic adenopathy within the neck, chest (mediastinum and chest wall).  Some of  these areas are stable with some nodes slightly diminished and some decreased degrees of FDG uptake within some of these nodes.     -However, there is extensive progression of peritoneal carcinomatosis throughout the abdomen and pelvis with increasing number and size of peritoneal hypermetabolic nodularity and increasing amount of ascites.  There is also large amount heterogeneous FDG uptake within the left hepatic lobe concerning for hepatic metastatic disease which is new.     -there has also been interval development moderate right-sided hydronephrosis and hydroureter with ureter likely being obstructed by peritoneal implants.     -increasing small to moderate bilateral loculated pleural effusions.  Extensive mixed interstitial and ground-glass infiltrates throughout the lungs.  Question pneumonia/post treatment related changes.  Lymphangitic carcinomatosis could appear similar    Assessment/Plan:     Active Diagnoses:    Diagnosis Date Noted POA    PRINCIPAL PROBLEM:  Acute renal failure superimposed on stage 3a chronic kidney disease [N17.9, N18.31] 10/22/2024 Yes    Moderate malnutrition [E44.0] 10/25/2024 Unknown    Nausea & vomiting [R11.2] 10/22/2024 Yes    Hypotension due to hypovolemia [E86.1] 10/22/2024 Yes    Elevated liver enzymes [R74.8] 10/22/2024 Yes    Chronic respiratory failure with hypoxia [J96.11] 10/22/2024 Yes    Chronic diastolic CHF (congestive heart failure) [I50.32] 10/22/2024 Yes    History of DVT (deep vein thrombosis) [Z86.718] 10/22/2024 Not Applicable    Normocytic anemia [D64.9] 10/22/2024 Yes    Transaminitis [R74.01] 10/03/2024 Yes    JERALD (obstructive sleep apnea) [G47.33] 08/19/2024 Yes    Ovarian cancer, bilateral [C56.3] 08/15/2019 Yes    Peritoneal carcinomatosis [C78.6] 01/26/2019 Yes    Hyperlipidemia [E78.5] 02/01/2018 Yes      Problems Resolved During this Admission:     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  Patient started doxorubicin/Avastin September 6, 2024  Presented in Gyn/Onc tumor board 07/31/24 with consensus for FRa testing and proceed with mirvetuximab if positive and if negative proceed with Doxil   Hospice discussions have been taking place also discuss with hospitalist today do not see numbers improving so far     AK IA on CKD patient being hydrated.  Patient has right-sided hydronephrosis and hydroureter with ureter likely being an obstructed by peritoneal Mets     Elevated LFTs: Probably secondary to dehydration metastatic disease continue to monitor     Hypotension.  Improving now holding all antihypertensives  Thank you for your consult      Jen Fairbanks MD  Hematology/Oncology  [facility]

## 2024-10-29 NOTE — PLAN OF CARE
Nutrition Recommendations/Interventions for malnutrition 10/29/24:  1. When medically appropriate, recommend advance pt's diet to a bland diet, texture per SLP recommendations   2. Recommend to continue antiemetics as warranted  3. Encourage PO intake, recommend feeding assistance   4. If PO intake >25% x 3 days or unable to advance diet by day 5, recommend re-consult RD for alternative means of nutrition   5. Weigh twice weekly   6. Collaboration by nutrition professional with other providers     Goals:   1. Pt's diet will be advanced and to safest diet texture prior to RD follow up   2. Pt will tolerate and intake >75% EEN and EPN prior to RD follow up  3. Pt's nausea will be improved prior to RD follow up   4. If warranted, RD will be re-consulted within 3-5 days     NESS Carlos, RDN, LDN

## 2024-10-29 NOTE — PLAN OF CARE
10/29/24 1546   Rounds   Attendance Provider;;Charge nurse;Physical therapist   Discharge Plan A Home with family   Why the patient remains in the hospital Requires continued medical care   Transition of Care Barriers None       Assessment/Plan:      * Acute renal failure superimposed on stage 3a chronic kidney disease  MIRELA is likely due to pre-renal azotemia due to dehydration related to nausea with vomiting, hypotension, inadequate oral intake, and likely also related to progressive metastatic disease. Baseline creatinine is  1.1-1.3 . Most recent creatinine and eGFR are listed below.        Recent Labs     10/26/24  0540 10/27/24  0622 10/28/24  0508   CREATININE 1.7* 2.0* 1.9*   EGFRNORACEVR 32* 27* 28*          Plan  - MIRELA is improving--> patient received albumin 25% 25 g IV x1 dose in the emergency department. Patient is in need of IV fluids for the treatment of MIRELA.  Due to a shortage of IV fluids, patient to receive oral fluids.  Patient must receive this treatment in a hospital location due to the risk of adverse effects, insufficient response to treatment, or other potential complications of disease. We will encourage increased fluid intake, Pedialyte 600 mL p.o. q.4 hours ordered. 10/26- albumin 25% 100g IV x1 administered  - Avoid nephrotoxins and renally dose meds for GFR listed above  - Monitor urine output, serial BMP, and adjust therapy as needed  - Urology evaluation of hydronephrosis on imaging noted kidney obstruction likely secondary from progression of cancer and after discussion with the patient, patient opted for conservative management at the time.  10/27 given concerns for worsening renal function, patient would like re-evaluation for stent placement.  Discussed with Urology, Urology re-evaluation pending.        10/28/24  Urology planning for stent placement today   Creatinine slightly trended down   Follow up on labs in a.m.         Moderate malnutrition  Nutrition  consulted. Most recent weight and BMI monitored-      Measurements:      Wt Readings from Last 1 Encounters:   10/24/24 104.6 kg (230 lb 9.6 oz)   Body mass index is 36.12 kg/m².     Patient has been screened and assessed by RD.     Malnutrition Type:  Context: chronic illness  Level: moderate     Malnutrition Characteristic Summary:  Weight Loss (Malnutrition): 10% in 6 months  Energy Intake (Malnutrition): less than or equal to 75% for greater than or equal to 1 month  Fluid Accumulation (Malnutrition): mild     Interventions/Recommendations (treatment strategy):  1. General Healthful Diet  2. Commercial beverage medical food supplement therapy 3. Collaboration by nutrition professional with other providers        Normocytic anemia  Anemia is likely due to chronic disease due to Malignancy and chronic kidney disease. Most recent hemoglobin and hematocrit are listed below.        Recent Labs     10/25/24  0849 10/26/24  0540 10/27/24  0622   HGB 11.9* 11.1* 10.1*   HCT 39.5 35.4* 32.0*         Plan  - Monitor serial CBC: Daily  - Transfuse PRBC if patient becomes hemodynamically unstable, symptomatic or H/H drops below 7/21.  - Patient has not received any PRBC transfusions to date  - Patient's anemia is currently stable        History of DVT (deep vein thrombosis)  -recent left lower extremity venous ultrasound 10/15/2024 was negative for DVT  -bilateral lower extremity edema most likely related to metastatic disease and hypoalbuminemia     10/26-Patient reports she is no longer on anticoagulation after she completed her DVT treatment and has discussed this with Dr. Pugh(Heme-Onc)     PLAN:  -Repeat ultrasound negative for DVT  -DVT prophylaxis with enoxaparin     Chronic diastolic CHF (congestive heart failure)  Patient has Diastolic (HFpEF) heart failure that is Chronic. On presentation their CHF was well compensated. Most recent BNP and echo results are listed below.        Latest ECHO  Results for orders  "placed during the hospital encounter of 09/03/24     Echo     Interpretation Summary    Left Ventricle: The left ventricle is normal in size. Normal wall thickness. There is normal systolic function with a visually estimated ejection fraction of 60 - 65%. Biplane (2D) method of discs ejection fraction is 56%. Global longitudinal strain is -19.5%. There is normal diastolic function.    Right Ventricle: Normal right ventricular cavity size. Wall thickness is normal. Systolic function is normal.    IVC/SVC: Normal venous pressure at 3 mmHg.     Malignant neoplasm of both ovaries     Baseline echo 9/3/2024  NORM -19.47%  Biplane 56%     Current Heart Failure Medications  hydrALAZINE injection 10 mg, Every 6 hours PRN, Intravenous     Plan  - Monitor strict I&Os and daily weights.    - Place on telemetry  - Cardiology has not been consulted  - suspect volume overload likely secondary to 3rd spacing in the setting of failure and hepatic failure     Chronic respiratory failure with hypoxia  Patient with Hypoxic Respiratory failure which is Chronic.  she is on home oxygen at 3-4 LPM.      -CXR this admit with " Similar degree of vascular congestion versus reticular interstitial changes throughout the lungs with patchy ground-glass opacities.  Stable small to moderate left effusion.  New small right effusion.  Differential considerations would include chronic or recurrent CHF versus interstitial infectious process such as atypical viral pneumonia.  Clinical correlation is advised."  -recent CTA of chest 10/02/2024 was reviewed  -initial labs notable for white blood cell count 4.10, lactic acid level 1.0, BNP 27, afebrile  -procalcitonin level elevated, however utility limited given MIRELA     -no antibiotics warranted at this time  -no diuretics warranted at this time  -continuous pulse oximetry monitoring, O2 supplementation, incentive spirometry, and p.r.n. albuterol inhaler     Elevated liver enzymes  Upon admission, alk phos " "1018, total bilirubin 1.2, , , INR 1.2, BNP 27, lactic acid level 1.0, creatinine 2.4  -Evaluation of transaminitis with hepatitis panel negative, and abdominal ultrasound noting previously seen "severely heterogeneous echotexture throughout the liver with poorly defined mass within the left hepatic lobe measuring up to 8.8 cm. Additional smaller hypoechoic lesions seen throughout concerning for metastatic disease. " Negative for gallstones, common bile duct dilation, or Back's sign.            Recent Labs     10/25/24  0849 10/26/24  0540 10/27/24  0622   * 744* 765*   * 259* 255*         -Evaluation of transaminitis by Heme-Onc noted likely secondary to hypovolemia with concerns if no improvement could be secondary to advancement of malignancy but less likely given recent chemotherapy resumption.        -hold Crestor and avoid hepatotoxic agents  -recent CT scan of abdomen/pelvis and PET scan were reviewed  -10/24/24 Discussion with hepatology, Interventional Radiology, Heme-Onc regarding worsened liver function.  Concerns for progression of metastatic disease, goals of care discussions ongoing.         Hypotension due to hypovolemia  -hypotension improved with IV albumin given in the emergency department  -hold Norvasc, atenolol, Benicar/HCT  -limit sedating agents        Nausea & vomiting  -bland diet, Pedialyte scheduled, IV albumin x1 dose  -p.r.n. Antiemetics           Transaminitis        Recent Labs     10/25/24  0849 10/26/24  0540 10/27/24  0622   * 744* 765*   * 259* 255*               10/24/24 Discussion with hepatology, Interventional Radiology, Heme-Onc regarding worsened liver function.  Concerns for progression of metastatic disease, goals of care discussions ongoing.         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 New moderate-sized bilateral pleural effusions as described above with adjacent atelectasis or consolidation.No enlarged mediastinal and hilar lymph nodes. Intralobular septal thickening and associated groundglass opacity bilaterally felt to represent edema.New irregular soft tissue mass in the left upper quadrant concerning for carcinomatosis."  -CT scan of abdomen and pelvis 10/06/2024 with Widespread metastatic disease suspected with abnormal lymph nodes within the retroperitoneum, jovan hepatis, mediastinum as well as multiple abnormal liver lesions and focal thickening within the large bowel. Patient needs tissue sampling of liver lesion. Recommend colonoscopy and possible biopsy of sigmoid lesion."  -nuclear medicine PET scan 10/11/2024 with Overall there has been progression of disease. There is continued hypermetabolic adenopathy within the neck, chest (mediastinum and chest wall).  Some of these areas are stable with some nodes slightly diminished and some decreased degrees of FDG uptake within some of these nodes. However, there is extensive progression of peritoneal carcinomatosis throughout the abdomen and pelvis with increasing number and size of peritoneal hypermetabolic nodularity and increasing amount of ascites.  There is also large amount heterogeneous FDG uptake within the left hepatic lobe concerning for hepatic metastatic disease which is new. there has also been interval development moderate right-sided hydronephrosis and hydroureter with ureter likely being obstructed by peritoneal implants. increasing small to moderate bilateral loculated pleural effusions.  Extensive mixed interstitial and ground-glass infiltrates throughout the lungs.  Question pneumonia/post treatment related changes.  Lymphangitic carcinomatosis could appear similar"      "   -Heme-Onc consulted and after goals of care discussion, plan to continue with treatment at this time.  Follow up recs.   - Discussion with hepatology, Interventional Radiology, Heme-Onc regarding worsened liver function.  Concerns for progression of metastatic disease, goals of care discussions ongoing.    -p.r.n. pain control     Peritoneal carcinomatosis  See discussion for bilateral ovarian cancer     Hyperlipidemia  -hold Crestor in the setting of worsening LFTs              VTE Risk Mitigation (From admission, onward)              Ordered       enoxaparin injection 40 mg  Every 24 hours         10/26/24 1612       IP VTE HIGH RISK PATIENT  Once         10/22/24 2032       Place sequential compression device  Until discontinued         10/22/24 2032                          Discharge Planning   EVE:      Code Status: Full Code   Is the patient medically ready for discharge?:     Reason for patient still in hospital (select all that apply): Patient trending condition, Consult recommendations, and Pending disposition  Discharge Plan A: Home with family

## 2024-10-29 NOTE — PROGRESS NOTES
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 Pugh from clinic for hypotension and abnormal labs with acute kidney injury and worsening liver enzymes.  Symptoms were acute onset, moderate severity, and with blood pressure improved with albumin administration given in the emergency department.  Patient denies any associated chest pain, increased shortness of breath, cough, fevers, chills.  She does complain of recurrent episodes of nausea with vomiting.  She has chronic abdominal pain but has noted increasing distention to her abdomen as well as increasing lower extremity swelling.  She denies any diarrhea, constipation, dysuria, or hematuria.  Patient is actively receiving chemotherapy with last dose received 10/15/2024.    Last hospital admit 10/2-10/07/2024 for shortness for breath, new bilateral pleural effusion, pneumonia, and images with worsening metastatic disease.  Patient received IV cefepime.    Overview/Hospital Course:  Admitted to Hospital Medicine for evaluation of constitutional symptoms and labs concerning for MIRELA in setting of poor oral intake.   "Started on increased fluid hydration.  Heme-Onc consulted and after goals of care discussion, plan to continue with treatment at this time.  Evaluation of lower extremity pain/swelling without concerns for DVT.  Evaluation of transaminitis with hepatitis panel negative, and abdominal ultrasound noting previously seen "severely heterogeneous echotexture throughout the liver with poorly defined mass within the left hepatic lobe measuring up to 8.8 cm. Additional smaller hypoechoic lesions seen throughout concerning for metastatic disease. " Negative for gallstones, common bile duct dilation, or Back's sign. Evaluation of transaminitis by Heme-Onc noted likely secondary to hypovolemia with concerns if no improvement could be secondary to advancement of malignancy but less likely given recent chemotherapy resumption. Abdominal ultrasound noted concerns for "Mild to moderate right-sided hydronephrosis", Urology evaluation noted kidney obstruction likely secondary from progression of cancer and after discussion with the patient, patient opted for conservative management at the time.  Discussion with hepatology, Interventional Radiology, Heme-Onc regarding worsened liver function.  Concerns for progression of metastatic disease, goals of care discussions ongoing.  Renal function continued to worsen despite albumin trial.  Goals for care discussion with the patient, patient now amenable to trial of stent placement for potential improvement in kidney function.  On 10/28/2024, Urology planning for stent placement, given patient's age, underlying comorbidities, acute on chronic issues, poor prognosis, discussed extensively regarding goals of care,-patient expressed full code for now, however open for further goals of care discussion, palliative medicine on board.  Consulted palliative Medicine.  Status post stent placement on 10/28/2024 for right hydro;     Interval History:     No acute events overnight   Complaining of " abdominal fullness, decreased appetite   Status post stent placement yesterday per Urology  Creatinine trending up   LFTs still elevated   Ordered IV fluids due to poor p.o. intake   Planning for family meeting this afternoon to discuss on goals of care    Review of Systems  Objective:     Vital Signs (Most Recent):  Temp: 97.8 °F (36.6 °C) (10/29/24 0744)  Pulse: 95 (10/29/24 0824)  Resp: 18 (10/29/24 0943)  BP: (!) 108/57 (10/29/24 0744)  SpO2: 95 % (10/29/24 0824) Vital Signs (24h Range):  Temp:  [97.4 °F (36.3 °C)-98.4 °F (36.9 °C)] 97.8 °F (36.6 °C)  Pulse:  [] 95  Resp:  [16-21] 18  SpO2:  [81 %-100 %] 95 %  BP: (100-134)/(56-75) 108/57     Weight: 104.6 kg (230 lb 9.6 oz)  Body mass index is 36.12 kg/m².    Intake/Output Summary (Last 24 hours) at 10/29/2024 1104  Last data filed at 10/29/2024 1050  Gross per 24 hour   Intake --   Output 500 ml   Net -500 ml         Physical Exam      Constitutional:       General: She is not in acute distress.     Appearance: She is obese. She is ill-appearing. She is not toxic-appearing or diaphoretic.   HENT:      Head: Normocephalic and atraumatic.      Mouth/Throat:      Mouth: Mucous membranes are moist.   Eyes:      General: No scleral icterus.        Right eye: No discharge.         Left eye: No discharge.   Cardiovascular:      Rate and Rhythm: Normal rate and regular rhythm.      Heart sounds: Normal heart sounds.   Pulmonary:      Effort: Pulmonary effort is normal. No respiratory distress.      Breath sounds: Normal breath sounds.   Abdominal:      General: Bowel sounds are normal. There is distension.      Palpations: Abdomen is soft.      Tenderness: There is no abdominal tenderness. There is no guarding or rebound.   Musculoskeletal:         General: Swelling present.      Cervical back: No rigidity.      Right lower leg: Edema present.      Left lower leg: Edema present.   Skin:     General: Skin is warm and dry.      Coloration: Skin is not jaundiced.    Neurological:      Mental Status: She is alert and oriented to person, place, and time. Mental status is at baseline.   Psychiatric:         Mood and Affect: Mood normal.         Behavior: Behavior normal.   Significant Labs: All pertinent labs within the past 24 hours have been reviewed.  CBC:   Recent Labs   Lab 10/28/24  0508 10/29/24  0433   WBC 4.30 4.30   HGB 10.8* 10.8*   HCT 34.4* 33.9*   * 114*     CMP:   Recent Labs   Lab 10/28/24  0508 10/29/24  0433    145   K 4.2 4.7   * 110   CO2 22* 21*   GLU 76 82   BUN 59* 66*   CREATININE 1.9* 2.6*   CALCIUM 9.4 9.2   PROT 5.9* 6.0   ALBUMIN 3.2* 3.2*   BILITOT 3.3* 3.4*   ALKPHOS 921* 942*   * 820*   * 251*   ANIONGAP 11 14       Significant Imaging:     Imaging Results              US Abdomen Limited (Final result)  Result time 10/23/24 08:32:31      Final result by Chandrakant Fermin MD (10/23/24 08:32:31)                   Impression:      Severe heterogeneous echotexture throughout the liver with metastatic disease as above.      Electronically signed by: Chandrakant Fermin MD  Date:    10/23/2024  Time:    08:32               Narrative:    EXAMINATION:  US ABDOMEN LIMITED    CLINICAL HISTORY:  worsening elevation in LFTs, MIRELA/CKD3a, abdominal distention, metastatic ovarian cancer;    COMPARISON:  10/05/2024    FINDINGS:  Limited right upper quadrant ultrasound performed.  The liver measures 17.2 cm in length and again demonstrates severely heterogeneous echotexture throughout the liver with poorly defined mass within the left hepatic lobe measuring up to 8.8 cm.  Additional smaller hypoechoic lesions seen throughout concerning for metastatic disease.  Common bile duct is within normal limits at 5 mm..  No gallstones.  Mild wall thickening within the gallbladder which could reflect intrinsic liver disease.  Negative sonographic Back sign.  The visualized portions of the pancreas and IVC are within normal limits.  The right kidney  is normal in length measuring 12 cm. Mild to moderate right-sided hydronephrosis.  No definite renal mass seen.  Spleen is normal in size.                                       X-Ray Chest AP Portable (Final result)  Result time 10/22/24 17:30:24      Final result by Partha Miguel MD (10/22/24 17:30:24)                   Impression:      1.  Similar degree of vascular congestion versus reticular interstitial changes throughout the lungs with patchy ground-glass opacities.  Stable small to moderate left effusion.  New small right effusion.  Differential considerations would include chronic or recurrent CHF versus interstitial infectious process such as atypical viral pneumonia.  Clinical correlation is advised.    2.  Stable findings as noted above.      Electronically signed by: Partha Miguel MD  Date:    10/22/2024  Time:    17:30               Narrative:    EXAMINATION:  XR CHEST AP PORTABLE    CLINICAL HISTORY:  Shortness of breath    COMPARISON:  October 2, 2024    FINDINGS:  EKG leads overlie the chest.  Stable small to moderate left pleural effusion.  New small right pleural effusion.  Similar degree of vascular congestion versus reticular interstitial changes throughout the lungs.  Patchy ground-glass opacities noted as well.  The lungs are free of new pulmonary opacities.  The cardiac silhouette size is on the upper limits of normal.  Stable left-sided chest port.  Tortuous aorta with calcifications of the aortic knob.  There are degenerative changes of the spine and both shoulder girdles.                                       Assessment/Plan:      * Acute renal failure superimposed on stage 3a chronic kidney disease  MIRELA is likely due to pre-renal azotemia due to dehydration related to nausea with vomiting, hypotension, inadequate oral intake, and likely also related to progressive metastatic disease. Baseline creatinine is  1.1-1.3 . Most recent creatinine and eGFR are listed below.  Recent Labs      10/26/24  0540 10/27/24  0622 10/28/24  0508   CREATININE 1.7* 2.0* 1.9*   EGFRNORACEVR 32* 27* 28*        Plan  - MIRELA is improving--> patient received albumin 25% 25 g IV x1 dose in the emergency department. Patient is in need of IV fluids for the treatment of MIRELA.  Due to a shortage of IV fluids, patient to receive oral fluids.  Patient must receive this treatment in a hospital location due to the risk of adverse effects, insufficient response to treatment, or other potential complications of disease. We will encourage increased fluid intake, Pedialyte 600 mL p.o. q.4 hours ordered. 10/26- albumin 25% 100g IV x1 administered  - Avoid nephrotoxins and renally dose meds for GFR listed above  - Monitor urine output, serial BMP, and adjust therapy as needed  - Urology evaluation of hydronephrosis on imaging noted kidney obstruction likely secondary from progression of cancer and after discussion with the patient, patient opted for conservative management at the time.  10/27 given concerns for worsening renal function, patient would like re-evaluation for stent placement.  Discussed with Urology, Urology re-evaluation pending.      10/28/24  Urology planning for stent placement today   Creatinine slightly trended down   Follow up on labs in a.m.       Moderate malnutrition  Nutrition consulted. Most recent weight and BMI monitored-     Measurements:  Wt Readings from Last 1 Encounters:   10/24/24 104.6 kg (230 lb 9.6 oz)   Body mass index is 36.12 kg/m².    Patient has been screened and assessed by RD.    Malnutrition Type:  Context: chronic illness  Level: moderate    Malnutrition Characteristic Summary:  Weight Loss (Malnutrition): 10% in 6 months  Energy Intake (Malnutrition): less than or equal to 75% for greater than or equal to 1 month  Fluid Accumulation (Malnutrition): mild    Interventions/Recommendations (treatment strategy):  1. General Healthful Diet  2. Commercial beverage medical food supplement therapy  3. Collaboration by nutrition professional with other providers      Normocytic anemia  Anemia is likely due to chronic disease due to Malignancy and chronic kidney disease. Most recent hemoglobin and hematocrit are listed below.  Recent Labs     10/25/24  0849 10/26/24  0540 10/27/24  0622   HGB 11.9* 11.1* 10.1*   HCT 39.5 35.4* 32.0*       Plan  - Monitor serial CBC: Daily  - Transfuse PRBC if patient becomes hemodynamically unstable, symptomatic or H/H drops below 7/21.  - Patient has not received any PRBC transfusions to date  - Patient's anemia is currently stable      History of DVT (deep vein thrombosis)  -recent left lower extremity venous ultrasound 10/15/2024 was negative for DVT  -bilateral lower extremity edema most likely related to metastatic disease and hypoalbuminemia    10/26-Patient reports she is no longer on anticoagulation after she completed her DVT treatment and has discussed this with Dr. Pugh(Heme-Onc)    PLAN:  -Repeat ultrasound negative for DVT  -DVT prophylaxis with enoxaparin    Chronic diastolic CHF (congestive heart failure)  Patient has Diastolic (HFpEF) heart failure that is Chronic. On presentation their CHF was well compensated. Most recent BNP and echo results are listed below.      Latest ECHO  Results for orders placed during the hospital encounter of 09/03/24    Echo    Interpretation Summary    Left Ventricle: The left ventricle is normal in size. Normal wall thickness. There is normal systolic function with a visually estimated ejection fraction of 60 - 65%. Biplane (2D) method of discs ejection fraction is 56%. Global longitudinal strain is -19.5%. There is normal diastolic function.    Right Ventricle: Normal right ventricular cavity size. Wall thickness is normal. Systolic function is normal.    IVC/SVC: Normal venous pressure at 3 mmHg.    Malignant neoplasm of both ovaries    Baseline echo 9/3/2024  NORM -19.47%  Biplane 56%    Current Heart Failure  "Medications  hydrALAZINE injection 10 mg, Every 6 hours PRN, Intravenous    Plan  - Monitor strict I&Os and daily weights.    - Place on telemetry  - Cardiology has not been consulted  - suspect volume overload likely secondary to 3rd spacing in the setting of failure and hepatic failure    Chronic respiratory failure with hypoxia  Patient with Hypoxic Respiratory failure which is Chronic.  she is on home oxygen at 3-4 LPM.     -CXR this admit with " Similar degree of vascular congestion versus reticular interstitial changes throughout the lungs with patchy ground-glass opacities.  Stable small to moderate left effusion.  New small right effusion.  Differential considerations would include chronic or recurrent CHF versus interstitial infectious process such as atypical viral pneumonia.  Clinical correlation is advised."  -recent CTA of chest 10/02/2024 was reviewed  -initial labs notable for white blood cell count 4.10, lactic acid level 1.0, BNP 27, afebrile  -procalcitonin level elevated, however utility limited given MIRELA    -no antibiotics warranted at this time  -no diuretics warranted at this time  -continuous pulse oximetry monitoring, O2 supplementation, incentive spirometry, and p.r.n. albuterol inhaler    Elevated liver enzymes  Upon admission, alk phos 1018, total bilirubin 1.2, , , INR 1.2, BNP 27, lactic acid level 1.0, creatinine 2.4  -Evaluation of transaminitis with hepatitis panel negative, and abdominal ultrasound noting previously seen "severely heterogeneous echotexture throughout the liver with poorly defined mass within the left hepatic lobe measuring up to 8.8 cm. Additional smaller hypoechoic lesions seen throughout concerning for metastatic disease. " Negative for gallstones, common bile duct dilation, or Back's sign.     Recent Labs     10/25/24  0849 10/26/24  0540 10/27/24  0622   * 744* 765*   * 259* 255*       -Evaluation of transaminitis by Heme-Onc noted " "likely secondary to hypovolemia with concerns if no improvement could be secondary to advancement of malignancy but less likely given recent chemotherapy resumption.      -hold Crestor and avoid hepatotoxic agents  -recent CT scan of abdomen/pelvis and PET scan were reviewed  -10/24/24 Discussion with hepatology, Interventional Radiology, Heme-Onc regarding worsened liver function.  Concerns for progression of metastatic disease, goals of care discussions ongoing.       Hypotension due to hypovolemia  -hypotension improved with IV albumin given in the emergency department  -hold Norvasc, atenolol, Benicar/HCT  -limit sedating agents      Nausea & vomiting  -bland diet, Pedialyte scheduled, IV albumin x1 dose  -p.r.n. Antiemetics        Transaminitis  Recent Labs     10/25/24  0849 10/26/24  0540 10/27/24  0622   * 744* 765*   * 259* 255*           10/24/24 Discussion with hepatology, Interventional Radiology, Heme-Onc regarding worsened liver function.  Concerns for progression of metastatic disease, goals of care discussions ongoing.       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 New moderate-sized bilateral pleural effusions as described above with adjacent atelectasis or consolidation.No enlarged mediastinal and hilar lymph nodes. Intralobular septal thickening and associated groundglass opacity bilaterally felt to represent edema.New irregular soft tissue mass in the left upper quadrant concerning for carcinomatosis."  -CT scan of abdomen and pelvis 10/06/2024 with Widespread metastatic disease suspected with abnormal lymph nodes within the retroperitoneum, jovan hepatis, mediastinum as well as multiple abnormal liver lesions and " "focal thickening within the large bowel. Patient needs tissue sampling of liver lesion. Recommend colonoscopy and possible biopsy of sigmoid lesion."  -nuclear medicine PET scan 10/11/2024 with Overall there has been progression of disease. There is continued hypermetabolic adenopathy within the neck, chest (mediastinum and chest wall).  Some of these areas are stable with some nodes slightly diminished and some decreased degrees of FDG uptake within some of these nodes. However, there is extensive progression of peritoneal carcinomatosis throughout the abdomen and pelvis with increasing number and size of peritoneal hypermetabolic nodularity and increasing amount of ascites.  There is also large amount heterogeneous FDG uptake within the left hepatic lobe concerning for hepatic metastatic disease which is new. there has also been interval development moderate right-sided hydronephrosis and hydroureter with ureter likely being obstructed by peritoneal implants. increasing small to moderate bilateral loculated pleural effusions.  Extensive mixed interstitial and ground-glass infiltrates throughout the lungs.  Question pneumonia/post treatment related changes.  Lymphangitic carcinomatosis could appear similar"      -Heme-Onc consulted and after goals of care discussion, plan to continue with treatment at this time.  Follow up recs.   - Discussion with hepatology, Interventional Radiology, Heme-Onc regarding worsened liver function.  Concerns for progression of metastatic disease, goals of care discussions ongoing.    -p.r.n. pain control    Peritoneal carcinomatosis  See discussion for bilateral ovarian cancer    Hyperlipidemia  -hold Crestor in the setting of worsening LFTs          VTE Risk Mitigation (From admission, onward)           Ordered     enoxaparin injection 40 mg  Every 24 hours         10/26/24 1612     IP VTE HIGH RISK PATIENT  Once         10/22/24 2032     Place sequential compression device  Until " discontinued         10/22/24 2032                    Discharge Planning   EVE:      Code Status: Full Code   Is the patient medically ready for discharge?:     Reason for patient still in hospital (select all that apply): Patient trending condition, Consult recommendations, and Pending disposition  Discharge Plan A: Home with family                  Alondra Abel MD  Department of Hospital Medicine   '[facility]

## 2024-10-30 VITALS
OXYGEN SATURATION: 92 % | BODY MASS INDEX: 36.2 KG/M2 | HEART RATE: 89 BPM | HEIGHT: 67 IN | RESPIRATION RATE: 16 BRPM | SYSTOLIC BLOOD PRESSURE: 107 MMHG | WEIGHT: 230.63 LBS | DIASTOLIC BLOOD PRESSURE: 56 MMHG | TEMPERATURE: 98 F

## 2024-10-30 DIAGNOSIS — C78.6 PERITONEAL CARCINOMATOSIS: ICD-10-CM

## 2024-10-30 DIAGNOSIS — Z51.5 ENCOUNTER FOR PALLIATIVE CARE: Primary | ICD-10-CM

## 2024-10-30 DIAGNOSIS — C56.3 OVARIAN CANCER, BILATERAL: ICD-10-CM

## 2024-10-30 LAB
ACANTHOCYTES BLD QL SMEAR: PRESENT
ALBUMIN SERPL BCP-MCNC: 2.9 G/DL (ref 3.5–5.2)
ALP SERPL-CCNC: 967 U/L (ref 40–150)
ALT SERPL W/O P-5'-P-CCNC: 188 U/L (ref 10–44)
ANION GAP SERPL CALC-SCNC: 8 MMOL/L (ref 8–16)
ANISOCYTOSIS BLD QL SMEAR: SLIGHT
AST SERPL-CCNC: 786 U/L (ref 10–40)
BASOPHILS # BLD AUTO: 0.03 K/UL (ref 0–0.2)
BASOPHILS NFR BLD: 0.9 % (ref 0–1.9)
BILIRUB SERPL-MCNC: 3.5 MG/DL (ref 0.1–1)
BUN SERPL-MCNC: 69 MG/DL (ref 8–23)
CALCIUM SERPL-MCNC: 8.8 MG/DL (ref 8.7–10.5)
CHLORIDE SERPL-SCNC: 109 MMOL/L (ref 95–110)
CO2 SERPL-SCNC: 24 MMOL/L (ref 23–29)
CREAT SERPL-MCNC: 2.7 MG/DL (ref 0.5–1.4)
DACRYOCYTES BLD QL SMEAR: ABNORMAL
DIFFERENTIAL METHOD BLD: ABNORMAL
EOSINOPHIL # BLD AUTO: 0.1 K/UL (ref 0–0.5)
EOSINOPHIL NFR BLD: 2.1 % (ref 0–8)
ERYTHROCYTE [DISTWIDTH] IN BLOOD BY AUTOMATED COUNT: 20.2 % (ref 11.5–14.5)
EST. GFR  (NO RACE VARIABLE): 19 ML/MIN/1.73 M^2
GLUCOSE SERPL-MCNC: 118 MG/DL (ref 70–110)
HCT VFR BLD AUTO: 32.5 % (ref 37–48.5)
HGB BLD-MCNC: 10.5 G/DL (ref 12–16)
IMM GRANULOCYTES # BLD AUTO: 0.02 K/UL (ref 0–0.04)
IMM GRANULOCYTES NFR BLD AUTO: 0.6 % (ref 0–0.5)
LYMPHOCYTES # BLD AUTO: 0.5 K/UL (ref 1–4.8)
LYMPHOCYTES NFR BLD: 15.9 % (ref 18–48)
MAGNESIUM SERPL-MCNC: 2.5 MG/DL (ref 1.6–2.6)
MCH RBC QN AUTO: 28.2 PG (ref 27–31)
MCHC RBC AUTO-ENTMCNC: 32.3 G/DL (ref 32–36)
MCV RBC AUTO: 87 FL (ref 82–98)
MONOCYTES # BLD AUTO: 0.1 K/UL (ref 0.3–1)
MONOCYTES NFR BLD: 3.2 % (ref 4–15)
NEUTROPHILS # BLD AUTO: 2.6 K/UL (ref 1.8–7.7)
NEUTROPHILS NFR BLD: 77.3 % (ref 38–73)
NRBC BLD-RTO: 0 /100 WBC
OVALOCYTES BLD QL SMEAR: ABNORMAL
PHOSPHATE SERPL-MCNC: 3.3 MG/DL (ref 2.7–4.5)
PLATELET # BLD AUTO: 84 K/UL (ref 150–450)
PLATELET BLD QL SMEAR: ABNORMAL
PMV BLD AUTO: 10.4 FL (ref 9.2–12.9)
POCT GLUCOSE: 117 MG/DL (ref 70–110)
POIKILOCYTOSIS BLD QL SMEAR: SLIGHT
POTASSIUM SERPL-SCNC: 4.4 MMOL/L (ref 3.5–5.1)
PROT SERPL-MCNC: 5.7 G/DL (ref 6–8.4)
RBC # BLD AUTO: 3.73 M/UL (ref 4–5.4)
SCHISTOCYTES BLD QL SMEAR: PRESENT
SODIUM SERPL-SCNC: 141 MMOL/L (ref 136–145)
WBC # BLD AUTO: 3.4 K/UL (ref 3.9–12.7)

## 2024-10-30 PROCEDURE — 83735 ASSAY OF MAGNESIUM: CPT | Mod: HCNC | Performed by: STUDENT IN AN ORGANIZED HEALTH CARE EDUCATION/TRAINING PROGRAM

## 2024-10-30 PROCEDURE — 25000003 PHARM REV CODE 250: Mod: HCNC | Performed by: STUDENT IN AN ORGANIZED HEALTH CARE EDUCATION/TRAINING PROGRAM

## 2024-10-30 PROCEDURE — 84100 ASSAY OF PHOSPHORUS: CPT | Mod: HCNC | Performed by: STUDENT IN AN ORGANIZED HEALTH CARE EDUCATION/TRAINING PROGRAM

## 2024-10-30 PROCEDURE — 99900035 HC TECH TIME PER 15 MIN (STAT): Mod: HCNC

## 2024-10-30 PROCEDURE — 94799 UNLISTED PULMONARY SVC/PX: CPT | Mod: HCNC

## 2024-10-30 PROCEDURE — 85025 COMPLETE CBC W/AUTO DIFF WBC: CPT | Mod: HCNC | Performed by: STUDENT IN AN ORGANIZED HEALTH CARE EDUCATION/TRAINING PROGRAM

## 2024-10-30 PROCEDURE — 99223 1ST HOSP IP/OBS HIGH 75: CPT | Mod: HCNC,25,, | Performed by: NURSE PRACTITIONER

## 2024-10-30 PROCEDURE — 63600175 PHARM REV CODE 636 W HCPCS: Mod: HCNC | Performed by: STUDENT IN AN ORGANIZED HEALTH CARE EDUCATION/TRAINING PROGRAM

## 2024-10-30 PROCEDURE — 99498 ADVNCD CARE PLAN ADDL 30 MIN: CPT | Mod: HCNC,,, | Performed by: NURSE PRACTITIONER

## 2024-10-30 PROCEDURE — 27000221 HC OXYGEN, UP TO 24 HOURS: Mod: HCNC

## 2024-10-30 PROCEDURE — 80053 COMPREHEN METABOLIC PANEL: CPT | Mod: HCNC | Performed by: STUDENT IN AN ORGANIZED HEALTH CARE EDUCATION/TRAINING PROGRAM

## 2024-10-30 PROCEDURE — 99497 ADVNCD CARE PLAN 30 MIN: CPT | Mod: HCNC,,, | Performed by: NURSE PRACTITIONER

## 2024-10-30 PROCEDURE — 25000003 PHARM REV CODE 250: Mod: HCNC | Performed by: INTERNAL MEDICINE

## 2024-10-30 RX ORDER — HEPARIN 100 UNIT/ML
5 SYRINGE INTRAVENOUS ONCE
Status: COMPLETED | OUTPATIENT
Start: 2024-10-30 | End: 2024-10-30

## 2024-10-30 RX ORDER — ONDANSETRON 8 MG/1
8 TABLET, ORALLY DISINTEGRATING ORAL ONCE
Status: DISCONTINUED | OUTPATIENT
Start: 2024-10-30 | End: 2024-10-30

## 2024-10-30 RX ORDER — ONDANSETRON 8 MG/1
8 TABLET, ORALLY DISINTEGRATING ORAL ONCE
Status: COMPLETED | OUTPATIENT
Start: 2024-10-30 | End: 2024-10-30

## 2024-10-30 RX ADMIN — SERTRALINE HYDROCHLORIDE 25 MG: 25 TABLET ORAL at 09:10

## 2024-10-30 RX ADMIN — OXYCODONE HYDROCHLORIDE 5 MG: 5 TABLET ORAL at 01:10

## 2024-10-30 RX ADMIN — OXYCODONE HYDROCHLORIDE 5 MG: 5 TABLET ORAL at 08:10

## 2024-10-30 RX ADMIN — HEPARIN 500 UNITS: 100 SYRINGE at 06:10

## 2024-10-30 RX ADMIN — SODIUM CHLORIDE, SODIUM LACTATE, POTASSIUM CHLORIDE, CALCIUM CHLORIDE AND DEXTROSE MONOHYDRATE: 5; 600; 310; 30; 20 INJECTION, SOLUTION INTRAVENOUS at 01:10

## 2024-10-30 RX ADMIN — ONDANSETRON 8 MG: 8 TABLET, ORALLY DISINTEGRATING ORAL at 08:10

## 2024-10-30 NOTE — ASSESSMENT & PLAN NOTE
Chemotherapy no longer recommended for cancer control. Cancer is progressive with resulting recent pneumonia, increased dyspnea, reduced renal function. Ureteral stent did not improve renal function. Hospice recommended.

## 2024-10-30 NOTE — PLAN OF CARE
Problem: Skin Injury Risk Increased  Goal: Skin Health and Integrity  Outcome: Progressing     Problem: Infection  Goal: Absence of Infection Signs and Symptoms  Outcome: Progressing     Problem: Adult Inpatient Plan of Care  Goal: Patient-Specific Goal (Individualized)  Outcome: Progressing     Problem: Coping Ineffective  Goal: Effective Coping  Outcome: Progressing   Purposeful rounding   Call light within reach   Bed in lowest position   Free from falls   Pain managed   Chart check complete

## 2024-10-30 NOTE — DISCHARGE SUMMARY
[facility]  Discharge Summary      Patient Name: Casie Gaines  MRN: [id number]  [facility]: [id number]  Patient Class: IP- Inpatient  Admission Date: 10/22/2024  Hospital Length of Stay: 8 days  Discharge Date and Time: 10/30/24  Attending Physician: Alondra Abel,*   Discharging Provider: Alondra Abel MD  Primary Care Provider: Rossana Ang MD    Primary Care Team: Networked reference to record PCT     HPI:   68-year-old  woman with history of stage IV ovarian cancer with peritoneal carcinomatosis, chronic hypoxic respiratory failure (on 3-4 L nasal cannula at home), chronic diastolic CHF, hypertension, hyperlipidemia, obesity, ureteral stent placement, pulmonary hypertension, anxiety, cervical spine degenerative disc disease, obstructive sleep apnea, community-acquired pneumonia, chronic kidney disease stage IIIA, transaminitis, anemia, and history of DVT who was sent by Hematology Oncology Dr. Pugh from clinic for hypotension and abnormal labs with acute kidney injury and worsening liver enzymes.  Symptoms were acute onset, moderate severity, and with blood pressure improved with albumin administration given in the emergency department.  Patient denies any associated chest pain, increased shortness of breath, cough, fevers, chills.  She does complain of recurrent episodes of nausea with vomiting.  She has chronic abdominal pain but has noted increasing distention to her abdomen as well as increasing lower extremity swelling.  She denies any diarrhea, constipation, dysuria, or hematuria.  Patient is actively receiving chemotherapy with last dose received 10/15/2024.    Last hospital admit 10/2-10/07/2024 for shortness for breath, new bilateral pleural effusion, pneumonia, and images with worsening metastatic disease.  Patient received IV cefepime.    Procedure(s) (LRB):  CYSTOSCOPY, WITH URETERAL STENT INSERTION (Right)      Hospital Course:  "  Admitted to Hospital Medicine for evaluation of constitutional symptoms and labs concerning for MIRELA in setting of poor oral intake.  Started on increased fluid hydration.  Heme-Onc consulted and after goals of care discussion, plan to continue with treatment at this time.  Evaluation of lower extremity pain/swelling without concerns for DVT.  Evaluation of transaminitis with hepatitis panel negative, and abdominal ultrasound noting previously seen "severely heterogeneous echotexture throughout the liver with poorly defined mass within the left hepatic lobe measuring up to 8.8 cm. Additional smaller hypoechoic lesions seen throughout concerning for metastatic disease. " Negative for gallstones, common bile duct dilation, or Back's sign. Evaluation of transaminitis by Heme-Onc noted likely secondary to hypovolemia with concerns if no improvement could be secondary to advancement of malignancy but less likely given recent chemotherapy resumption. Abdominal ultrasound noted concerns for "Mild to moderate right-sided hydronephrosis", Urology evaluation noted kidney obstruction likely secondary from progression of cancer and after discussion with the patient, patient opted for conservative management at the time.  Discussion with hepatology, Interventional Radiology, Heme-Onc regarding worsened liver function.  Concerns for progression of metastatic disease, goals of care discussions ongoing.  Renal function continued to worsen despite albumin trial.  Goals for care discussion with the patient, patient now amenable to trial of stent placement for potential improvement in kidney function.  On 10/28/2024, Urology planning for stent placement, given patient's age, underlying comorbidities, acute on chronic issues, poor prognosis, discussed extensively regarding goals of care,-patient expressed full code for now, however open for further goals of care discussion, palliative medicine on board.  Consulted palliative " Medicine.  Status post stent placement on 10/28/2024 for right hydro;   BUN/creatinine still worsening;   Had family meeting along with palliative team on 10/29/2024, given patient's age, underlying comorbidities, advanced malignancy, discussed with Heme-Onc, stated that patient is no longer candidate to proceed with any further cancer treatment/chemo, -- had extensive discussion with patient and family members regarding this, opted for DNR DNI, opted to proceed with hospice at home with [facility];  10/30/2024   Examination of patient done at bedside, appeared alert and oriented x3, denied acute events overnight   Still expressing poor appetite, nausea   Hemodynamically stable   Patient/family agreeable to proceed with hospice   Planning to discharge patient today to home under hospice with [facility], appreciate    Patient/family prefers MediPort removal due to concerns of infection, ordered outpatient referral for intervention Radiology, patient/family agreed with the plan.       Goals of Care Treatment Preferences:  Code Status: DNR    Health care agent: Jed Frias  [facility] agent number: [phone]          What is most important right now is to focus on spending time at home, symptom/pain control.  Accordingly, we have decided that the best plan to meet the patient's goals includes enrolling in hospice care.      SDOH Screening:  The patient was screened for utility difficulties, food insecurity, transport difficulties, housing insecurity, and interpersonal safety and there were no concerns identified this admission.     Consults:   Consults (From admission, onward)          Status Ordering Provider     Inpatient consult to Social Work  Once        Provider:  (Not yet assigned)    Completed MEDINA CASTREJON     Inpatient Consult to Telemedicine - Hepatology  Once        Provider:  (Not yet assigned)    Completed BLAISE ARIZA     Inpatient consult to Urology  Once         Provider:  Anival Ariza, DO    Completed ANIVAL ARIZA     Inpatient consult to Hematology/Oncology  Once        Provider:  Torri House MD    Acknowledged USHA GRANT            No new Assessment & Plan notes have been filed under this hospital service since the last note was generated.  Service: Hospital Medicine    Final Active Diagnoses:    Diagnosis Date Noted POA    PRINCIPAL PROBLEM:  Acute renal failure superimposed on stage 3a chronic kidney disease [N17.9, N18.31] 10/22/2024 Yes    Encounter for palliative care [Z51.5] 10/30/2024 Not Applicable    Severe protein-calorie malnutrition [E43] 10/25/2024 Unknown    Nausea & vomiting [R11.2] 10/22/2024 Yes    Hypotension due to hypovolemia [E86.1] 10/22/2024 Yes    Elevated liver enzymes [R74.8] 10/22/2024 Yes    Chronic respiratory failure with hypoxia [J96.11] 10/22/2024 Yes    Chronic diastolic CHF (congestive heart failure) [I50.32] 10/22/2024 Yes    History of DVT (deep vein thrombosis) [Z86.718] 10/22/2024 Not Applicable    Normocytic anemia [D64.9] 10/22/2024 Yes    Transaminitis [R74.01] 10/03/2024 Yes    JERALD (obstructive sleep apnea) [G47.33] 08/19/2024 Yes    Ovarian cancer, bilateral [C56.3] 08/15/2019 Yes    Peritoneal carcinomatosis [C78.6] 01/26/2019 Yes    Hyperlipidemia [E78.5] 02/01/2018 Yes      Problems Resolved During this Admission:       Discharged Condition: poor    Disposition: Home or Self Care    Follow Up:   Contact information for follow-up providers       Rossana Ang MD Follow up in 1 week(s).    Specialty: Family Medicine  Contact information:  [address] [facility] [address] [phone]                       Contact information for after-discharge care       Home Medical Care       [facility] .    Service: Home Hospice  Contact information:  [address]  [facility] [address] [phone]                                 Patient Instructions:      Ambulatory  referral/consult to Outpatient Case Management   Referral Priority: Routine Referral Type: Consultation   Referral Reason: Specialty Services Required   Number of Visits Requested: 1     Ambulatory referral/consult to Interventional Radiology   Standing Status: Future   Referral Priority: Routine Referral Type: Consultation   Referral Reason: Specialty Services Required   Requested Specialty: Interventional Radiology   Number of Visits Requested: 1       Significant Diagnostic Studies:     Results for orders placed or performed during the hospital encounter of 10/22/24   CBC auto differential    Collection Time: 10/22/24  6:29 PM   Result Value Ref Range    WBC 4.10 3.90 - 12.70 K/uL    RBC 3.82 (L) 4.00 - 5.40 M/uL    Hemoglobin 10.6 (L) 12.0 - 16.0 g/dL    Hematocrit 33.8 (L) 37.0 - 48.5 %    MCV 89 82 - 98 fL    MCH 27.7 27.0 - 31.0 pg    MCHC 31.4 (L) 32.0 - 36.0 g/dL    RDW 19.3 (H) 11.5 - 14.5 %    Platelets 211 150 - 450 K/uL    MPV 9.9 9.2 - 12.9 fL    Immature Granulocytes 0.2 0.0 - 0.5 %    Gran # (ANC) 3.1 1.8 - 7.7 K/uL    Immature Grans (Abs) 0.01 0.00 - 0.04 K/uL    Lymph # 0.6 (L) 1.0 - 4.8 K/uL    Mono # 0.3 0.3 - 1.0 K/uL    Eos # 0.0 0.0 - 0.5 K/uL    Baso # 0.04 0.00 - 0.20 K/uL    nRBC 0 0 /100 WBC    Gran % 75.9 (H) 38.0 - 73.0 %    Lymph % 14.4 (L) 18.0 - 48.0 %    Mono % 7.8 4.0 - 15.0 %    Eosinophil % 0.7 0.0 - 8.0 %    Basophil % 1.0 0.0 - 1.9 %    Differential Method Automated    Comprehensive metabolic panel    Collection Time: 10/22/24  6:29 PM   Result Value Ref Range    Sodium 139 136 - 145 mmol/L    Potassium 4.4 3.5 - 5.1 mmol/L    Chloride 110 95 - 110 mmol/L    CO2 19 (L) 23 - 29 mmol/L    Glucose 79 70 - 110 mg/dL    BUN 80 (H) 8 - 23 mg/dL    Creatinine 2.4 (H) 0.5 - 1.4 mg/dL    Calcium 8.3 (L) 8.7 - 10.5 mg/dL    Total Protein 5.9 (L) 6.0 - 8.4 g/dL    Albumin 2.3 (L) 3.5 - 5.2 g/dL    Total Bilirubin 1.2 (H) 0.1 - 1.0 mg/dL    Alkaline Phosphatase 1,018 (H) 40 - 150 U/L    AST  720 (H) 10 - 40 U/L     (H) 10 - 44 U/L    eGFR 21 (A) >60 mL/min/1.73 m^2    Anion Gap 10 8 - 16 mmol/L   Troponin I #1    Collection Time: 10/22/24  6:29 PM   Result Value Ref Range    Troponin I <0.006 0.000 - 0.026 ng/mL   BNP    Collection Time: 10/22/24  6:29 PM   Result Value Ref Range    BNP 27 0 - 99 pg/mL   Magnesium    Collection Time: 10/22/24  6:29 PM   Result Value Ref Range    Magnesium 2.4 1.6 - 2.6 mg/dL   Protime-INR    Collection Time: 10/22/24  6:29 PM   Result Value Ref Range    Prothrombin Time 13.5 (H) 9.0 - 12.5 sec    INR 1.2 0.8 - 1.2   APTT    Collection Time: 10/22/24  6:29 PM   Result Value Ref Range    aPTT 64.7 (H) 21.0 - 32.0 sec   Lactic acid, plasma    Collection Time: 10/22/24  6:29 PM   Result Value Ref Range    Lactate (Lactic Acid) 1.0 0.5 - 2.2 mmol/L   EKG 12-lead    Collection Time: 10/22/24  6:39 PM   Result Value Ref Range    QRS Duration 74 ms    OHS QTC Calculation 448 ms   Urinalysis, Reflex to Urine Culture Urine, Clean Catch    Collection Time: 10/22/24  7:28 PM    Specimen: Urine   Result Value Ref Range    Specimen UA Urine, Clean Catch     Color, UA Yellow Yellow, Straw, Luc[place]    Appearance, UA Hazy (A) Clear    pH, UA 5.0 5.0 - 8.0    Specific Gravity, UA 1.020 1.005 - 1.030    Protein, UA 1+ (A) Negative    Glucose, UA Negative Negative    Ketones, UA Negative Negative    Bilirubin (UA) Negative Negative    Occult Blood UA 2+ (A) Negative    Nitrite, UA Negative Negative    Urobilinogen, UA 2.0-3.0 (A) <2.0 EU/dL    Leukocytes, UA Negative Negative   Urinalysis Microscopic    Collection Time: 10/22/24  7:28 PM   Result Value Ref Range    RBC, UA >100 (H) 0 - 4 /hpf    WBC, UA 5 0 - 5 /hpf    Bacteria Rare None-Occ /hpf    Squam Epithel, UA 1 /hpf    Hyaline Casts, UA 8 (A) 0-1/lpf /lpf    Microscopic Comment SEE COMMENT    CK    Collection Time: 10/22/24  9:53 PM   Result Value Ref Range     20 - 180 U/L   TSH    Collection Time: 10/22/24  9:53 PM    Result Value Ref Range    TSH 5.342 (H) 0.400 - 4.000 uIU/mL   T4, free    Collection Time: 10/22/24  9:53 PM   Result Value Ref Range    Free T4 1.01 0.71 - 1.51 ng/dL   Hepatitis panel, acute    Collection Time: 10/22/24  9:53 PM   Result Value Ref Range    Hepatitis B Surface Ag Non-reactive Non-reactive    Hep B C IgM Non-reactive Non-reactive    Hep A IgM Non-reactive Non-reactive    Hepatitis C Ab Non-reactive Non-reactive   Salicylate level    Collection Time: 10/22/24  9:53 PM   Result Value Ref Range    Salicylate Lvl <5.0 (L) 15.0 - 30.0 mg/dL   Acetaminophen level    Collection Time: 10/22/24  9:53 PM   Result Value Ref Range    Acetaminophen (Tylenol), Serum <3.0 (L) 10.0 - 20.0 ug/mL   Lactic Acid, Plasma    Collection Time: 10/22/24  9:53 PM   Result Value Ref Range    Lactate (Lactic Acid) 1.0 0.5 - 2.2 mmol/L   Procalcitonin    Collection Time: 10/22/24  9:53 PM   Result Value Ref Range    Procalcitonin 3.18 (H) <0.25 ng/mL   Lactic Acid, Plasma    Collection Time: 10/22/24 11:59 PM   Result Value Ref Range    Lactate (Lactic Acid) 0.9 0.5 - 2.2 mmol/L   CBC Auto Differential    Collection Time: 10/23/24  4:11 AM   Result Value Ref Range    WBC 3.56 (L) 3.90 - 12.70 K/uL    RBC 3.85 (L) 4.00 - 5.40 M/uL    Hemoglobin 10.6 (L) 12.0 - 16.0 g/dL    Hematocrit 33.8 (L) 37.0 - 48.5 %    MCV 88 82 - 98 fL    MCH 27.5 27.0 - 31.0 pg    MCHC 31.4 (L) 32.0 - 36.0 g/dL    RDW 19.3 (H) 11.5 - 14.5 %    Platelets 220 150 - 450 K/uL    MPV 9.9 9.2 - 12.9 fL    Immature Granulocytes 0.3 0.0 - 0.5 %    Gran # (ANC) 2.4 1.8 - 7.7 K/uL    Immature Grans (Abs) 0.01 0.00 - 0.04 K/uL    Lymph # 0.8 (L) 1.0 - 4.8 K/uL    Mono # 0.3 0.3 - 1.0 K/uL    Eos # 0.0 0.0 - 0.5 K/uL    Baso # 0.04 0.00 - 0.20 K/uL    nRBC 0 0 /100 WBC    Gran % 67.4 38.0 - 73.0 %    Lymph % 22.8 18.0 - 48.0 %    Mono % 7.3 4.0 - 15.0 %    Eosinophil % 1.1 0.0 - 8.0 %    Basophil % 1.1 0.0 - 1.9 %    Poik Moderate     Ovalocytes Moderate      Target Cells Occasional     Tear Drop Cells Occasional     [place] Cells Occasional     Acanthocytes Present     Large/Giant Platelets Present     Differential Method Automated    Comprehensive metabolic panel    Collection Time: 10/23/24  4:11 AM   Result Value Ref Range    Sodium 140 136 - 145 mmol/L    Potassium 4.6 3.5 - 5.1 mmol/L    Chloride 109 95 - 110 mmol/L    CO2 20 (L) 23 - 29 mmol/L    Glucose 77 70 - 110 mg/dL    BUN 82 (H) 8 - 23 mg/dL    Creatinine 2.3 (H) 0.5 - 1.4 mg/dL    Calcium 8.7 8.7 - 10.5 mg/dL    Total Protein 5.6 (L) 6.0 - 8.4 g/dL    Albumin 2.7 (L) 3.5 - 5.2 g/dL    Total Bilirubin 1.3 (H) 0.1 - 1.0 mg/dL    Alkaline Phosphatase 937 (H) 40 - 150 U/L     (H) 10 - 40 U/L     (H) 10 - 44 U/L    eGFR 22 (A) >60 mL/min/1.73 m^2    Anion Gap 11 8 - 16 mmol/L   Protime-INR    Collection Time: 10/23/24  4:11 AM   Result Value Ref Range    Prothrombin Time 13.9 (H) 9.0 - 12.5 sec    INR 1.3 (H) 0.8 - 1.2   Phosphorus    Collection Time: 10/24/24  9:51 AM   Result Value Ref Range    Phosphorus 3.3 2.7 - 4.5 mg/dL   Magnesium    Collection Time: 10/24/24  9:51 AM   Result Value Ref Range    Magnesium 2.6 1.6 - 2.6 mg/dL   Comprehensive Metabolic Panel    Collection Time: 10/24/24  9:51 AM   Result Value Ref Range    Sodium 143 136 - 145 mmol/L    Potassium 4.4 3.5 - 5.1 mmol/L    Chloride 110 95 - 110 mmol/L    CO2 22 (L) 23 - 29 mmol/L    Glucose 82 70 - 110 mg/dL    BUN 74 (H) 8 - 23 mg/dL    Creatinine 1.9 (H) 0.5 - 1.4 mg/dL    Calcium 9.0 8.7 - 10.5 mg/dL    Total Protein 6.0 6.0 - 8.4 g/dL    Albumin 2.7 (L) 3.5 - 5.2 g/dL    Total Bilirubin 1.7 (H) 0.1 - 1.0 mg/dL    Alkaline Phosphatase 1,072 (H) 40 - 150 U/L     (H) 10 - 40 U/L     (H) 10 - 44 U/L    eGFR 28 (A) >60 mL/min/1.73 m^2    Anion Gap 11 8 - 16 mmol/L   CBC Auto Differential    Collection Time: 10/24/24  9:51 AM   Result Value Ref Range    WBC 4.39 3.90 - 12.70 K/uL    RBC 4.32 4.00 - 5.40 M/uL     Hemoglobin 12.0 12.0 - 16.0 g/dL    Hematocrit 38.0 37.0 - 48.5 %    MCV 88 82 - 98 fL    MCH 27.8 27.0 - 31.0 pg    MCHC 31.6 (L) 32.0 - 36.0 g/dL    RDW 19.5 (H) 11.5 - 14.5 %    Platelets 239 150 - 450 K/uL    MPV 9.9 9.2 - 12.9 fL    Immature Granulocytes 0.5 0.0 - 0.5 %    Gran # (ANC) 3.4 1.8 - 7.7 K/uL    Immature Grans (Abs) 0.02 0.00 - 0.04 K/uL    Lymph # 0.6 (L) 1.0 - 4.8 K/uL    Mono # 0.2 (L) 0.3 - 1.0 K/uL    Eos # 0.0 0.0 - 0.5 K/uL    Baso # 0.06 0.00 - 0.20 K/uL    nRBC 0 0 /100 WBC    Gran % 77.9 (H) 38.0 - 73.0 %    Lymph % 14.1 (L) 18.0 - 48.0 %    Mono % 5.2 4.0 - 15.0 %    Eosinophil % 0.9 0.0 - 8.0 %    Basophil % 1.4 0.0 - 1.9 %    Differential Method Automated    Phosphorus    Collection Time: 10/25/24  8:49 AM   Result Value Ref Range    Phosphorus 3.3 2.7 - 4.5 mg/dL   Magnesium    Collection Time: 10/25/24  8:49 AM   Result Value Ref Range    Magnesium 2.5 1.6 - 2.6 mg/dL   Comprehensive Metabolic Panel    Collection Time: 10/25/24  8:49 AM   Result Value Ref Range    Sodium 142 136 - 145 mmol/L    Potassium 4.2 3.5 - 5.1 mmol/L    Chloride 110 95 - 110 mmol/L    CO2 21 (L) 23 - 29 mmol/L    Glucose 89 70 - 110 mg/dL    BUN 69 (H) 8 - 23 mg/dL    Creatinine 1.7 (H) 0.5 - 1.4 mg/dL    Calcium 9.0 8.7 - 10.5 mg/dL    Total Protein 6.1 6.0 - 8.4 g/dL    Albumin 2.5 (L) 3.5 - 5.2 g/dL    Total Bilirubin 1.9 (H) 0.1 - 1.0 mg/dL    Alkaline Phosphatase 1,117 (H) 40 - 150 U/L     (H) 10 - 40 U/L     (H) 10 - 44 U/L    eGFR 32 (A) >60 mL/min/1.73 m^2    Anion Gap 11 8 - 16 mmol/L   CBC Auto Differential    Collection Time: 10/25/24  8:49 AM   Result Value Ref Range    WBC 4.37 3.90 - 12.70 K/uL    RBC 4.40 4.00 - 5.40 M/uL    Hemoglobin 11.9 (L) 12.0 - 16.0 g/dL    Hematocrit 39.5 37.0 - 48.5 %    MCV 90 82 - 98 fL    MCH 27.0 27.0 - 31.0 pg    MCHC 30.1 (L) 32.0 - 36.0 g/dL    RDW 19.9 (H) 11.5 - 14.5 %    Platelets 227 150 - 450 K/uL    MPV 11.1 9.2 - 12.9 fL    Immature  Granulocytes 0.5 0.0 - 0.5 %    Gran # (ANC) 3.4 1.8 - 7.7 K/uL    Immature Grans (Abs) 0.02 0.00 - 0.04 K/uL    Lymph # 0.7 (L) 1.0 - 4.8 K/uL    Mono # 0.2 (L) 0.3 - 1.0 K/uL    Eos # 0.1 0.0 - 0.5 K/uL    Baso # 0.05 0.00 - 0.20 K/uL    nRBC 0 0 /100 WBC    Gran % 77.7 (H) 38.0 - 73.0 %    Lymph % 15.6 (L) 18.0 - 48.0 %    Mono % 3.7 (L) 4.0 - 15.0 %    Eosinophil % 1.4 0.0 - 8.0 %    Basophil % 1.1 0.0 - 1.9 %    Platelet Estimate Appears normal     [place] Slight     Poik Slight     Hypo Occasional     Ovalocytes Occasional     Differential Method Automated    Phosphorus    Collection Time: 10/26/24  5:40 AM   Result Value Ref Range    Phosphorus 3.4 2.7 - 4.5 mg/dL   Magnesium    Collection Time: 10/26/24  5:40 AM   Result Value Ref Range    Magnesium 2.6 1.6 - 2.6 mg/dL   Comprehensive Metabolic Panel    Collection Time: 10/26/24  5:40 AM   Result Value Ref Range    Sodium 140 136 - 145 mmol/L    Potassium 4.4 3.5 - 5.1 mmol/L    Chloride 110 95 - 110 mmol/L    CO2 23 23 - 29 mmol/L    Glucose 77 70 - 110 mg/dL    BUN 61 (H) 8 - 23 mg/dL    Creatinine 1.7 (H) 0.5 - 1.4 mg/dL    Calcium 8.8 8.7 - 10.5 mg/dL    Total Protein 5.5 (L) 6.0 - 8.4 g/dL    Albumin 2.3 (L) 3.5 - 5.2 g/dL    Total Bilirubin 1.9 (H) 0.1 - 1.0 mg/dL    Alkaline Phosphatase 965 (H) 40 - 150 U/L     (H) 10 - 40 U/L     (H) 10 - 44 U/L    eGFR 32 (A) >60 mL/min/1.73 m^2    Anion Gap 7 (L) 8 - 16 mmol/L   CBC Auto Differential    Collection Time: 10/26/24  5:40 AM   Result Value Ref Range    WBC 4.13 3.90 - 12.70 K/uL    RBC 3.97 (L) 4.00 - 5.40 M/uL    Hemoglobin 11.1 (L) 12.0 - 16.0 g/dL    Hematocrit 35.4 (L) 37.0 - 48.5 %    MCV 89 82 - 98 fL    MCH 28.0 27.0 - 31.0 pg    MCHC 31.4 (L) 32.0 - 36.0 g/dL    RDW 19.7 (H) 11.5 - 14.5 %    Platelets 202 150 - 450 K/uL    MPV 10.7 9.2 - 12.9 fL    Immature Granulocytes 0.5 0.0 - 0.5 %    Gran # (ANC) 3.0 1.8 - 7.7 K/uL    Immature Grans (Abs) 0.02 0.00 - 0.04 K/uL    Lymph # 0.7  (L) 1.0 - 4.8 K/uL    Mono # 0.2 (L) 0.3 - 1.0 K/uL    Eos # 0.2 0.0 - 0.5 K/uL    Baso # 0.05 0.00 - 0.20 K/uL    nRBC 0 0 /100 WBC    Gran % 73.2 (H) 38.0 - 73.0 %    Lymph % 16.7 (L) 18.0 - 48.0 %    Mono % 3.6 (L) 4.0 - 15.0 %    Eosinophil % 4.8 0.0 - 8.0 %    Basophil % 1.2 0.0 - 1.9 %    Platelet Estimate Appears normal     [place] Slight     Target Cells Occasional     Tear Drop Cells Occasional     Schistocytes Present     Differential Method Automated    Phosphorus    Collection Time: 10/27/24  6:22 AM   Result Value Ref Range    Phosphorus 3.5 2.7 - 4.5 mg/dL   Magnesium    Collection Time: 10/27/24  6:22 AM   Result Value Ref Range    Magnesium 2.7 (H) 1.6 - 2.6 mg/dL   Comprehensive Metabolic Panel    Collection Time: 10/27/24  6:22 AM   Result Value Ref Range    Sodium 141 136 - 145 mmol/L    Potassium 4.2 3.5 - 5.1 mmol/L    Chloride 108 95 - 110 mmol/L    CO2 20 (L) 23 - 29 mmol/L    Glucose 84 70 - 110 mg/dL    BUN 64 (H) 8 - 23 mg/dL    Creatinine 2.0 (H) 0.5 - 1.4 mg/dL    Calcium 9.3 8.7 - 10.5 mg/dL    Total Protein 6.2 6.0 - 8.4 g/dL    Albumin 3.7 3.5 - 5.2 g/dL    Total Bilirubin 2.8 (H) 0.1 - 1.0 mg/dL    Alkaline Phosphatase 859 (H) 40 - 150 U/L     (H) 10 - 40 U/L     (H) 10 - 44 U/L    eGFR 27 (A) >60 mL/min/1.73 m^2    Anion Gap 13 8 - 16 mmol/L   CBC Auto Differential    Collection Time: 10/27/24  6:22 AM   Result Value Ref Range    WBC 3.50 (L) 3.90 - 12.70 K/uL    RBC 3.61 (L) 4.00 - 5.40 M/uL    Hemoglobin 10.1 (L) 12.0 - 16.0 g/dL    Hematocrit 32.0 (L) 37.0 - 48.5 %    MCV 89 82 - 98 fL    MCH 28.0 27.0 - 31.0 pg    MCHC 31.6 (L) 32.0 - 36.0 g/dL    RDW 19.8 (H) 11.5 - 14.5 %    Platelets 164 150 - 450 K/uL    MPV 10.5 9.2 - 12.9 fL    Immature Granulocytes 0.3 0.0 - 0.5 %    Gran # (ANC) 2.6 1.8 - 7.7 K/uL    Immature Grans (Abs) 0.01 0.00 - 0.04 K/uL    Lymph # 0.6 (L) 1.0 - 4.8 K/uL    Mono # 0.2 (L) 0.3 - 1.0 K/uL    Eos # 0.0 0.0 - 0.5 K/uL    Baso # 0.03 0.00 -  0.20 K/uL    nRBC 0 0 /100 WBC    Gran % 75.4 (H) 38.0 - 73.0 %    Lymph % 18.0 18.0 - 48.0 %    Mono % 4.3 4.0 - 15.0 %    Eosinophil % 1.1 0.0 - 8.0 %    Basophil % 0.9 0.0 - 1.9 %    Differential Method Automated    Phosphorus    Collection Time: 10/28/24  5:08 AM   Result Value Ref Range    Phosphorus 3.0 2.7 - 4.5 mg/dL   Magnesium    Collection Time: 10/28/24  5:08 AM   Result Value Ref Range    Magnesium 2.5 1.6 - 2.6 mg/dL   Comprehensive Metabolic Panel    Collection Time: 10/28/24  5:08 AM   Result Value Ref Range    Sodium 144 136 - 145 mmol/L    Potassium 4.2 3.5 - 5.1 mmol/L    Chloride 111 (H) 95 - 110 mmol/L    CO2 22 (L) 23 - 29 mmol/L    Glucose 76 70 - 110 mg/dL    BUN 59 (H) 8 - 23 mg/dL    Creatinine 1.9 (H) 0.5 - 1.4 mg/dL    Calcium 9.4 8.7 - 10.5 mg/dL    Total Protein 5.9 (L) 6.0 - 8.4 g/dL    Albumin 3.2 (L) 3.5 - 5.2 g/dL    Total Bilirubin 3.3 (H) 0.1 - 1.0 mg/dL    Alkaline Phosphatase 921 (H) 40 - 150 U/L     (H) 10 - 40 U/L     (H) 10 - 44 U/L    eGFR 28 (A) >60 mL/min/1.73 m^2    Anion Gap 11 8 - 16 mmol/L   CBC Auto Differential    Collection Time: 10/28/24  5:08 AM   Result Value Ref Range    WBC 4.30 3.90 - 12.70 K/uL    RBC 3.87 (L) 4.00 - 5.40 M/uL    Hemoglobin 10.8 (L) 12.0 - 16.0 g/dL    Hematocrit 34.4 (L) 37.0 - 48.5 %    MCV 89 82 - 98 fL    MCH 27.9 27.0 - 31.0 pg    MCHC 31.4 (L) 32.0 - 36.0 g/dL    RDW 19.9 (H) 11.5 - 14.5 %    Platelets 143 (L) 150 - 450 K/uL    MPV 10.6 9.2 - 12.9 fL    Immature Granulocytes 0.5 0.0 - 0.5 %    Gran # (ANC) 3.3 1.8 - 7.7 K/uL    Immature Grans (Abs) 0.02 0.00 - 0.04 K/uL    Lymph # 0.6 (L) 1.0 - 4.8 K/uL    Mono # 0.1 (L) 0.3 - 1.0 K/uL    Eos # 0.2 0.0 - 0.5 K/uL    Baso # 0.04 0.00 - 0.20 K/uL    nRBC 0 0 /100 WBC    Gran % 75.8 (H) 38.0 - 73.0 %    Lymph % 14.7 (L) 18.0 - 48.0 %    Mono % 3.0 (L) 4.0 - 15.0 %    Eosinophil % 5.1 0.0 - 8.0 %    Basophil % 0.9 0.0 - 1.9 %    Platelet Estimate Appears normal     [place]  Slight     Poik Moderate     Ovalocytes Occasional     Target Cells Occasional     Tear Drop Cells Occasional     Kina Cells Occasional     Acanthocytes Present     Differential Method Automated    Phosphorus    Collection Time: 10/29/24  4:33 AM   Result Value Ref Range    Phosphorus 4.0 2.7 - 4.5 mg/dL   Magnesium    Collection Time: 10/29/24  4:33 AM   Result Value Ref Range    Magnesium 2.6 1.6 - 2.6 mg/dL   Comprehensive Metabolic Panel    Collection Time: 10/29/24  4:33 AM   Result Value Ref Range    Sodium 145 136 - 145 mmol/L    Potassium 4.7 3.5 - 5.1 mmol/L    Chloride 110 95 - 110 mmol/L    CO2 21 (L) 23 - 29 mmol/L    Glucose 82 70 - 110 mg/dL    BUN 66 (H) 8 - 23 mg/dL    Creatinine 2.6 (H) 0.5 - 1.4 mg/dL    Calcium 9.2 8.7 - 10.5 mg/dL    Total Protein 6.0 6.0 - 8.4 g/dL    Albumin 3.2 (L) 3.5 - 5.2 g/dL    Total Bilirubin 3.4 (H) 0.1 - 1.0 mg/dL    Alkaline Phosphatase 942 (H) 40 - 150 U/L     (H) 10 - 40 U/L     (H) 10 - 44 U/L    eGFR 19 (A) >60 mL/min/1.73 m^2    Anion Gap 14 8 - 16 mmol/L   CBC Auto Differential    Collection Time: 10/29/24  4:33 AM   Result Value Ref Range    WBC 4.30 3.90 - 12.70 K/uL    RBC 3.85 (L) 4.00 - 5.40 M/uL    Hemoglobin 10.8 (L) 12.0 - 16.0 g/dL    Hematocrit 33.9 (L) 37.0 - 48.5 %    MCV 88 82 - 98 fL    MCH 28.1 27.0 - 31.0 pg    MCHC 31.9 (L) 32.0 - 36.0 g/dL    RDW 20.5 (H) 11.5 - 14.5 %    Platelets 114 (L) 150 - 450 K/uL    MPV 11.4 9.2 - 12.9 fL    Immature Granulocytes 0.5 0.0 - 0.5 %    Gran # (ANC) 3.4 1.8 - 7.7 K/uL    Immature Grans (Abs) 0.02 0.00 - 0.04 K/uL    Lymph # 0.7 (L) 1.0 - 4.8 K/uL    Mono # 0.1 (L) 0.3 - 1.0 K/uL    Eos # 0.0 0.0 - 0.5 K/uL    Baso # 0.03 0.00 - 0.20 K/uL    nRBC 0 0 /100 WBC    Gran % 79.6 (H) 38.0 - 73.0 %    Lymph % 15.3 (L) 18.0 - 48.0 %    Mono % 3.0 (L) 4.0 - 15.0 %    Eosinophil % 0.9 0.0 - 8.0 %    Basophil % 0.7 0.0 - 1.9 %    Platelet Estimate Decreased (A)     Aniso Slight     Poik Slight     Hypo  Occasional     Ovalocytes Occasional     Tear Drop Cells Occasional     Witherbee Cells Occasional     Schistocytes Present     Differential Method Automated    POCT glucose    Collection Time: 10/29/24 11:19 AM   Result Value Ref Range    POCT Glucose 93 70 - 110 mg/dL   Basic metabolic panel    Collection Time: 10/29/24 11:49 AM   Result Value Ref Range    Sodium 142 136 - 145 mmol/L    Potassium 4.5 3.5 - 5.1 mmol/L    Chloride 109 95 - 110 mmol/L    CO2 23 23 - 29 mmol/L    Glucose 86 70 - 110 mg/dL    BUN 66 (H) 8 - 23 mg/dL    Creatinine 2.6 (H) 0.5 - 1.4 mg/dL    Calcium 9.0 8.7 - 10.5 mg/dL    Anion Gap 10 8 - 16 mmol/L    eGFR 19 (A) >60 mL/min/1.73 m^2   POCT glucose    Collection Time: 10/29/24  5:31 PM   Result Value Ref Range    POCT Glucose 101 70 - 110 mg/dL   Phosphorus    Collection Time: 10/30/24  4:39 AM   Result Value Ref Range    Phosphorus 3.3 2.7 - 4.5 mg/dL   Magnesium    Collection Time: 10/30/24  4:39 AM   Result Value Ref Range    Magnesium 2.5 1.6 - 2.6 mg/dL   Comprehensive Metabolic Panel    Collection Time: 10/30/24  4:39 AM   Result Value Ref Range    Sodium 141 136 - 145 mmol/L    Potassium 4.4 3.5 - 5.1 mmol/L    Chloride 109 95 - 110 mmol/L    CO2 24 23 - 29 mmol/L    Glucose 118 (H) 70 - 110 mg/dL    BUN 69 (H) 8 - 23 mg/dL    Creatinine 2.7 (H) 0.5 - 1.4 mg/dL    Calcium 8.8 8.7 - 10.5 mg/dL    Total Protein 5.7 (L) 6.0 - 8.4 g/dL    Albumin 2.9 (L) 3.5 - 5.2 g/dL    Total Bilirubin 3.5 (H) 0.1 - 1.0 mg/dL    Alkaline Phosphatase 967 (H) 40 - 150 U/L     (H) 10 - 40 U/L     (H) 10 - 44 U/L    eGFR 19 (A) >60 mL/min/1.73 m^2    Anion Gap 8 8 - 16 mmol/L   CBC Auto Differential    Collection Time: 10/30/24  4:39 AM   Result Value Ref Range    WBC 3.40 (L) 3.90 - 12.70 K/uL    RBC 3.73 (L) 4.00 - 5.40 M/uL    Hemoglobin 10.5 (L) 12.0 - 16.0 g/dL    Hematocrit 32.5 (L) 37.0 - 48.5 %    MCV 87 82 - 98 fL    MCH 28.2 27.0 - 31.0 pg    MCHC 32.3 32.0 - 36.0 g/dL    RDW 20.2 (H)  11.5 - 14.5 %    Platelets 84 (L) 150 - 450 K/uL    MPV 10.4 9.2 - 12.9 fL    Immature Granulocytes 0.6 (H) 0.0 - 0.5 %    Gran # (ANC) 2.6 1.8 - 7.7 K/uL    Immature Grans (Abs) 0.02 0.00 - 0.04 K/uL    Lymph # 0.5 (L) 1.0 - 4.8 K/uL    Mono # 0.1 (L) 0.3 - 1.0 K/uL    Eos # 0.1 0.0 - 0.5 K/uL    Baso # 0.03 0.00 - 0.20 K/uL    nRBC 0 0 /100 WBC    Gran % 77.3 (H) 38.0 - 73.0 %    Lymph % 15.9 (L) 18.0 - 48.0 %    Mono % 3.2 (L) 4.0 - 15.0 %    Eosinophil % 2.1 0.0 - 8.0 %    Basophil % 0.9 0.0 - 1.9 %    Platelet Estimate Decreased (A)     Aniso Slight     Poik Slight     Ovalocytes Occasional     Tear Drop Cells Occasional     Acanthocytes Present     Schistocytes Present     Differential Method Automated    POCT glucose    Collection Time: 10/30/24 12:55 PM   Result Value Ref Range    POCT Glucose 117 (H) 70 - 110 mg/dL     *Note: Due to a large number of results and/or encounters for the requested time period, some results have not been displayed. A complete set of results can be found in Results Review.      Imaging Results              US Abdomen Limited (Final result)  Result time 10/23/24 08:32:31      Final result by Chandrakant Fermin MD (10/23/24 08:32:31)                   Impression:      Severe heterogeneous echotexture throughout the liver with metastatic disease as above.      Electronically signed by: Chandrakant Fermin MD  Date:    10/23/2024  Time:    08:32               Narrative:    EXAMINATION:  US ABDOMEN LIMITED    CLINICAL HISTORY:  worsening elevation in LFTs, MIRELA/CKD3a, abdominal distention, metastatic ovarian cancer;    COMPARISON:  10/05/2024    FINDINGS:  Limited right upper quadrant ultrasound performed.  The liver measures 17.2 cm in length and again demonstrates severely heterogeneous echotexture throughout the liver with poorly defined mass within the left hepatic lobe measuring up to 8.8 cm.  Additional smaller hypoechoic lesions seen throughout concerning for metastatic disease.   Common bile duct is within normal limits at 5 mm..  No gallstones.  Mild wall thickening within the gallbladder which could reflect intrinsic liver disease.  Negative sonographic Back sign.  The visualized portions of the pancreas and IVC are within normal limits.  The right kidney is normal in length measuring 12 cm. Mild to moderate right-sided hydronephrosis.  No definite renal mass seen.  Spleen is normal in size.                                       X-Ray Chest AP Portable (Final result)  Result time 10/22/24 17:30:24      Final result by Partha Miguel MD (10/22/24 17:30:24)                   Impression:      1.  Similar degree of vascular congestion versus reticular interstitial changes throughout the lungs with patchy ground-glass opacities.  Stable small to moderate left effusion.  New small right effusion.  Differential considerations would include chronic or recurrent CHF versus interstitial infectious process such as atypical viral pneumonia.  Clinical correlation is advised.    2.  Stable findings as noted above.      Electronically signed by: Partha Miguel MD  Date:    10/22/2024  Time:    17:30               Narrative:    EXAMINATION:  XR CHEST AP PORTABLE    CLINICAL HISTORY:  Shortness of breath    COMPARISON:  October 2, 2024    FINDINGS:  EKG leads overlie the chest.  Stable small to moderate left pleural effusion.  New small right pleural effusion.  Similar degree of vascular congestion versus reticular interstitial changes throughout the lungs.  Patchy ground-glass opacities noted as well.  The lungs are free of new pulmonary opacities.  The cardiac silhouette size is on the upper limits of normal.  Stable left-sided chest port.  Tortuous aorta with calcifications of the aortic knob.  There are degenerative changes of the spine and both shoulder girdles.                                       Pending Diagnostic Studies:       None           Medications:       Medication List        CHANGE  how you take these medications      cetirizine 10 MG tablet  Commonly known as: ZYRTEC  Take 1 tablet (10 mg total) by mouth once daily.  What changed:   when to take this  reasons to take this            CONTINUE taking these medications      albuterol 90 mcg/actuation inhaler  Commonly known as: PROVENTIL/VENTOLIN HFA  Inhale 2 puffs into the lungs every 4 (four) hours as needed for Wheezing. Rescue     ALPRAZolam 0.25 MG tablet  Commonly known as: XANAX  Take 1 tablet (0.25 mg total) by mouth 3 (three) times daily as needed for Anxiety.     biotin 1 mg tablet     diclofenac sodium 1 % Gel  Commonly known as: VOLTAREN  Apply once a day to back as needed     fluticasone propionate 50 mcg/actuation nasal spray  Commonly known as: FLONASE  1 spray (50 mcg total) by Each Nostril route every 12 (twelve) hours as needed for Rhinitis.     gabapentin 300 MG capsule  Commonly known as: NEURONTIN  Take 2 capsules (600 mg total) by mouth 3 (three) times daily.     ginkgo biloba 40 mg Tab     multivitamin with minerals tablet     ondansetron 8 MG Tbdl  Commonly known as: ZOFRAN-ODT  Take 1 tablet (8 mg total) by mouth every 8 (eight) hours.     prochlorperazine 5 MG tablet  Commonly known as: COMPAZINE  Take 1 tablet (5 mg total) by mouth every 6 (six) hours as needed for Nausea.     rosuvastatin 10 MG tablet  Commonly known as: CRESTOR  Take 1 tablet (10 mg total) by mouth once daily.     sertraline 25 MG tablet  Commonly known as: ZOLOFT  Take 1 tablet (25 mg total) by mouth once daily.     traMADoL 50 mg tablet  Commonly known as: ULTRAM  Take 1 tablet (50 mg total) by mouth every 12 (twelve) hours as needed for Pain.            STOP taking these medications      amLODIPine 5 MG tablet  Commonly known as: NORVASC     atenoloL 25 MG tablet  Commonly known as: TENORMIN     EPINEPHrine 0.3 mg/0.3 mL Atin  Commonly known as: EPIPEN     olmesartan-hydrochlorothiazide 40-25 mg per tablet  Commonly known as: BENICAR HCT                Indwelling Lines/Drains at time of discharge:   Lines/Drains/Airways       Central Venous Catheter Line  Duration             Port A Cath Single Lumen left subclavian -- days              Drain  Duration                  Ureteral Drain/Stent 10/28/24 1403 Right ureter 7 Fr. 1 day    Female External Urinary Catheter w/ Suction 10/28/24 6986 1 day                    Time spent on the discharge of patient: 94 minutes         Alondra Abel MD  Department of Hospital Medicine  [facility]

## 2024-10-30 NOTE — ASSESSMENT & PLAN NOTE
Meeting had with Dr Abel, myself, Mrs. Gaines and her family (spouse, brother, daughter, daughter-in-law, son on face time, best friend, and sister). We discussed overall poor prognosis given chemotherapy no longer effectively controlling cancer.   Mrs. Gaines and her family understand prognosis is poor and no further options available to treat cancer.    Mrs. Gaines would like to spend the remainder of her life at home, with family and comfortable.   Family would like to ensure comfort but they had a bad experience with hospice with Mrs Gaines' brother.   We discussed what cancer progression may look like.   Family's concerns addressed and questions answered.   Pt and family agree that DNR is appropriate given her poor overall prognosis and shortened life expectancy.   We all agree that Mrs Gaines' needs would best be served at home with the support of hospice. Dtr and pt request [facility] hospice.

## 2024-10-30 NOTE — PLAN OF CARE
Discussed poc with pt, pt verbalized understanding    Purposeful rounding every 2hours    VS wnl  Cardiac monitoring in use, pt is NSR, tele monitor # [id number]  Blood glucose monitoring   Fall precautions in place, remains injury free  Pt denies c/o any pain or discomfort at this time and patient will continue to be monitored.  Pain and nausea under control with PRN meds    IVFs  Accurate I&Os  Abx given as prescribed  Bed locked at lowest position  Call light within reach    Chart check complete  Will cont with POC

## 2024-10-30 NOTE — CONSULTS
[facility]  Palliative Medicine  Consult Note    Patient Name: Casie Gaines  MRN: [id number]  Admission Date: 10/22/2024  Hospital Length of Stay: 8 days  Code Status: DNR   Attending Provider: Alondra Abel,*  Consulting Provider: Torie Bradley NP  Primary Care Physician: Rossana Ang MD  Principal Problem:Acute renal failure superimposed on stage 3a chronic kidney disease    Patient information was obtained from patient, relative(s), and primary team.      Consults  Assessment/Plan:     Oncology  Ovarian cancer, bilateral  Chemotherapy no longer recommended for cancer control. Cancer is progressive with resulting recent pneumonia, increased dyspnea, reduced renal function. Ureteral stent did not improve renal function. Hospice recommended.    Palliative Care  Encounter for palliative care  Meeting had with Dr Abel, myself, Mrs. Gaines and her family (spouse, brother, daughter, daughter-in-law, son on face time, best friend, and sister). We discussed overall poor prognosis given chemotherapy no longer effectively controlling cancer.   Mrs. Gaines and her family understand prognosis is poor and no further options available to treat cancer.    Mrs. Gaines would like to spend the remainder of her life at home, with family and comfortable.   Family would like to ensure comfort but they had a bad experience with hospice with Mrs Gaines' brother.   We discussed what cancer progression may look like.   Family's concerns addressed and questions answered.   Pt and family agree that DNR is appropriate given her poor overall prognosis and shortened life expectancy.   We all agree that Mrs Gaines' needs would best be served at home with the support of hospice. Dtr and pt request \[facility] [place] [facility]\"" hospice.         Thank you for your consult. I will sign off. Please contact us if you have any additional questions.    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 Pugh from clinic for hypotension and abnormal labs with acute kidney injury and worsening liver enzymes.  Symptoms were acute onset, moderate severity, and with blood pressure improved with albumin administration given in the emergency department.  Patient denies any associated chest pain, increased shortness of breath, cough, fevers, chills.  She does complain of recurrent episodes of nausea with vomiting.  She has chronic abdominal pain but has noted increasing distention to her abdomen as well as increasing lower extremity swelling.  She denies any diarrhea, constipation, dysuria, or hematuria.  Patient is actively receiving chemotherapy with last dose received 10/15/2024.     Last hospital admit 10/2-10/07/2024 for shortness for breath, new bilateral pleural effusion, pneumonia, and images with worsening metastatic disease.  Patient received IV cefepime.     Overview/Hospital Course:  Admitted to Hospital Medicine for evaluation of constitutional symptoms and labs concerning for MIRELA in setting of poor oral intake.  Started on increased fluid hydration.  Heme-Onc consulted and after goals of care discussion, plan to continue with treatment at this time.  Evaluation of lower extremity pain/swelling without concerns for DVT.  Evaluation of transaminitis with hepatitis panel negative, and abdominal ultrasound noting previously seen "severely heterogeneous echotexture throughout the liver with poorly defined mass within the left hepatic lobe measuring up to 8.8 cm. Additional " "smaller hypoechoic lesions seen throughout concerning for metastatic disease. " Negative for gallstones, common bile duct dilation, or Back's sign. Evaluation of transaminitis by Heme-Onc noted likely secondary to hypovolemia with concerns if no improvement could be secondary to advancement of malignancy but less likely given recent chemotherapy resumption. Abdominal ultrasound noted concerns for "Mild to moderate right-sided hydronephrosis", Urology evaluation noted kidney obstruction likely secondary from progression of cancer and after discussion with the patient, patient opted for conservative management at the time.  Discussion with hepatology, Interventional Radiology, Heme-Onc regarding worsened liver function.  Concerns for progression of metastatic disease, goals of care discussions ongoing.  Renal function continued to worsen despite albumin trial.  Goals for care discussion with the patient, patient now amenable to trial of stent placement for potential improvement in kidney function.  Urology re-evaluation pending.  Patient still reports poor appetite, and still having concerns with nausea/vomiting. Still reports abdominal and lower extremity swelling.  Renal function worsened, despite albumin trial.  Extensive discussions with patient regarding concerns for worsening prognosis.  After goals of care discussions, patient would like to continue treatment at this time and would like urology re-evaluation as previously she was offered stent placement but she declined at the time due to preference for conservative measures to see if kidney function would improve.  Discussed with Urology, plans for re-evaluation tomorrow. Concerns for progression of metastatic disease, Heme-Onc following, goals of care discussions ongoing.     10/29/24 - Oncologist recommends hospice. Pt now considering.       Hospital Course:  No notes on file    Interval History: Examined in bed with multiple family at bedside. Today " dyspnea at rest and with speech.      Past Medical History:   Diagnosis Date    Anemia     Anxiety     Arthritis     knees    Cancer     ovarian    CHF (congestive heart failure)     Hx antineoplastic chemotherapy     last 2019    Hyperlipemia     Hypertension     Neck pain     Ovarian cancer 2019    CHEMO    Peritoneal carcinomatosis 2019       Past Surgical History:   Procedure Laterality Date    breast reduction  10/02/2018    CATARACT EXTRACTION Bilateral     2023     SECTION      X 1    COLONOSCOPY N/A 2021    Procedure: COLONOSCOPY;  Surgeon: Paulette Rojas MD;  Location: [facility] ENDO;  Service: Gastroenterology;  Laterality: N/A;    CYSTOSCOPY W/ URETERAL STENT PLACEMENT Right 2019    Procedure: CYSTOSCOPY, WITH URETERAL STENT INSERTION;  Surgeon: Timmy Santiago IV, MD;  Location: [facility] OR;  Service: Urology;  Laterality: Right;    CYSTOSCOPY W/ URETERAL STENT PLACEMENT Right 10/28/2024    Procedure: CYSTOSCOPY, WITH URETERAL STENT INSERTION;  Surgeon: Andres Elizondo MD;  Location: [facility] OR;  Service: Urology;  Laterality: Right;    CYSTOSCOPY W/ URETERAL STENT REMOVAL  10/04/2019    DILATION AND CURETTAGE OF UTERUS      HYSTERECTOMY      RALH for fibroids (still has ovaries)    INSERTION OF VENOUS ACCESS PORT Left 2019    Procedure: INSERTION, VENOUS ACCESS PORT;  Surgeon: Ulisses Monzon MD;  Location: [facility] OR;  Service: General;  Laterality: Left;  Left internal jugular     LYSIS OF ADHESIONS OF URETER N/A 08/15/2019    Procedure: URETEROLYSIS;  Surgeon: Ismael Juarez MD;  Location: [facility] OR;  Service: OB/GYN;  Laterality: N/A;    OMENTECTOMY N/A 08/15/2019    Procedure: OMENTECTOMY;  Surgeon: Ismael Juarez MD;  Location: [facility] OR;  Service: OB/GYN;  Laterality: N/A;    OOPHORECTOMY      RETROGRADE PYELOGRAPHY Right 2019    Procedure: PYELOGRAM, RETROGRADE;  Surgeon: Timmy Santiago IV, MD;  Location: [facility] OR;  Service: Urology;  Laterality:  Right;    ROBOT-ASSISTED LAPAROSCOPIC SALPINGO-OOPHORECTOMY USING DA TIA XI Bilateral 08/15/2019    Procedure: XI ROBOTIC SALPINGO-OOPHORECTOMY;  Surgeon: Ismael Juarez MD;  Location: [facility];  Service: OB/GYN;  Laterality: Bilateral;    TOTAL REDUCTION MAMMOPLASTY  2018    TUBAL LIGATION         Review of patient's allergies indicates:  No Known Allergies    Medications:  Continuous Infusions:   dextrose 5% lactated ringers   Intravenous Continuous 75 mL/hr at 10/30/24 0118 New Bag at 10/30/24 0118     Scheduled Meds:   electrolytes-dextrose  600 mL Oral Q4H    enoxparin  30 mg Subcutaneous Q24H (prophylaxis, 1700)    sertraline  25 mg Oral Daily     PRN Meds:  Current Facility-Administered Medications:     albuterol sulfate, 2.5 mg, Nebulization, Q4H PRN    ALPRAZolam, 0.25 mg, Oral, TID PRN    alteplase, 2 mg, Intra-Catheter, PRN    dextrose 10%, 12.5 g, Intravenous, PRN    dextrose 10%, 25 g, Intravenous, PRN    fluticasone propionate, 1 spray, Each Nostril, Q12H PRN    glucagon (human recombinant), 1 mg, Intramuscular, PRN    glucose, 16 g, Oral, PRN    glucose, 24 g, Oral, PRN    hydrALAZINE, 10 mg, Intravenous, Q6H PRN    naloxone, 0.02 mg, Intravenous, PRN    ondansetron, 4 mg, Intravenous, Q6H PRN    oxyCODONE, 5 mg, Oral, Q6H PRN    prochlorperazine, 2.5 mg, Intravenous, Q6H PRN    sodium chloride 0.9%, 10 mL, Intravenous, Q12H PRN    Family History       Problem Relation (Age of Onset)    Anesthesia problems Other    Breast cancer Maternal Aunt, Paternal Aunt    Colon cancer Brother    Glaucoma Mother    No Known Problems Father    Ovarian cancer Paternal Aunt          Tobacco Use    Smoking status: Former     Current packs/day: 0.00     Average packs/day: 1 pack/day for 25.0 years (25.0 ttl pk-yrs)     Types: Cigarettes     Start date: 10/1/1993     Quit date: 10/1/2018     Years since quittin.0    Smokeless tobacco: Never    Tobacco comments:     States started quit 2 months ago after 30 years    Substance and Sexual Activity    Alcohol use: Yes     Comment: occasionally  No alcohol 72h prior to sx    Drug use: No    Sexual activity: Not Currently     Partners: Male     Comment: hyst; mut monog       Review of Systems  Objective:     Vital Signs (Most Recent):  Temp: 97.6 °F (36.4 °C) (10/30/24 0732)  Pulse: 88 (10/30/24 0928)  Resp: 16 (10/30/24 0732)  BP: (!) 91/53 (10/30/24 0732)  SpO2: 100 % (10/30/24 0819) Vital Signs (24h Range):  Temp:  [97.5 °F (36.4 °C)-98.1 °F (36.7 °C)] 97.6 °F (36.4 °C)  Pulse:  [87-96] 88  Resp:  [16-18] 16  SpO2:  [90 %-100 %] 100 %  BP: ()/(53-70) 91/53     Weight: 104.6 kg (230 lb 9.6 oz)  Body mass index is 36.12 kg/m².       Physical Exam  Constitutional:       Appearance: She is ill-appearing.   HENT:      Mouth/Throat:      Mouth: Mucous membranes are dry.   Eyes:      General: No scleral icterus.  Pulmonary:      Comments: Mild accessory muscle use intermittently at rest. Dyspnea with speech  Abdominal:      General: There is no distension.      Tenderness: There is no abdominal tenderness.   Neurological:      Mental Status: She is alert.   Psychiatric:         Mood and Affect: Mood normal.         Behavior: Behavior normal.         Thought Content: Thought content normal.         Judgment: Judgment normal.            Review of Symptoms      Symptom Assessment (ESAS 0-10 Scale)  Pain:  0  Dyspnea:  8  Anxiety:  4  Nausea:  0  Depression:  0  Anorexia:  0  Fatigue:  0  Insomnia:  0  Restlessness:  0  Agitation:  0         ECOG Performance Status ndGndrndanddndend:nd nd2nd Living Arrangements:  Lives with family and Lives with spouse    Psychosocial/Cultural:   See Palliative Psychosocial Note: Yes  **Primary  to Follow**  Palliative Care  Consult: No        Advance Care Planning  Advance Directives:   [place]POST: No (will defer to hospice)    Do Not Resuscitate Status: Yes    Medical Power of : Yes (completing today)    Agent's Name:  Jed  Rodriguez   Agent's Contact Number:  [phone]    Decision Making:  Patient answered questions  Goals of Care: The patient endorses that what is most important right now is to focus on spending time at home and symptom/pain control    Accordingly, we have decided that the best plan to meet the patient's goals includes enrolling in hospice care.         Significant Labs: All pertinent labs within the past 24 hours have been reviewed.  CBC:   Recent Labs   Lab 10/30/24  0439   WBC 3.40*   HGB 10.5*   HCT 32.5*   MCV 87   PLT 84*     BMP:  Recent Labs   Lab 10/30/24  0439   *      K 4.4      CO2 24   BUN 69*   CREATININE 2.7*   CALCIUM 8.8   MG 2.5     LFT:  Lab Results   Component Value Date     (H) 10/30/2024    ALKPHOS 967 (H) 10/30/2024    BILITOT 3.5 (H) 10/30/2024     Albumin:   Albumin   Date Value Ref Range Status   10/30/2024 2.9 (L) 3.5 - 5.2 g/dL Final     Protein:   Total Protein   Date Value Ref Range Status   10/30/2024 5.7 (L) 6.0 - 8.4 g/dL Final     Lactic acid:   Lab Results   Component Value Date    LACTATE 0.9 10/22/2024    LACTATE 1.0 10/22/2024       Significant Imaging: I have reviewed all pertinent imaging results/findings within the past 24 hours.  10/23/24 Abd U/S  Limited right upper quadrant ultrasound performed.  The liver measures 17.2 cm in length and again demonstrates severely heterogeneous echotexture throughout the liver with poorly defined mass within the left hepatic lobe measuring up to 8.8 cm.  Additional smaller hypoechoic lesions seen throughout concerning for metastatic disease.  Common bile duct is within normal limits at 5 mm..  No gallstones.  Mild wall thickening within the gallbladder which could reflect intrinsic liver disease.  Negative sonographic Back sign.  The visualized portions of the pancreas and IVC are within normal limits.  The right kidney is normal in length measuring 12 cm. Mild to moderate right-sided hydronephrosis.  No definite  renal mass seen.  Spleen is normal in size.     Impression:     Severe heterogeneous echotexture throughout the liver with metastatic disease as above.       I spent a total of 45 minutes on the day of the visit. This includes face to face time in discussion of goals of care, symptom assessment, coordination of care and emotional support.  This also includes non-face to face time preparing to see the patient (eg, review of tests/imaging), obtaining and/or reviewing separately obtained history, documenting clinical information in the electronic or other health record, independently interpreting results and communicating results to the patient/family/caregiver, or care coordinator.    Additional 70 min time spent on a voluntary advance care planning and /or goals of care discussion, providing emotional support, formulating and communicating prognosis and exploring burden/benefit of various approaches of treatment.      Torie Bradley NP  Palliative Medicine  [facility] S[facility]

## 2024-10-30 NOTE — PLAN OF CARE
[facility]  Discharge Final Note    Primary Care Provider: Rossana Ang MD    Expected Discharge Date: 10/30/2024    Final Discharge Note (most recent)       Final Note - 10/30/24 0948          Final Note    Assessment Type Final Discharge Note     Anticipated Discharge Disposition Hospice/Home     Hospital Resources/Appts/Education Provided Post-Acute resouces added to AVS        Post-Acute Status    Post-Acute Authorization Hospice     Hospice Status Pending equipment/medication delivery     Discharge Delays None known at this time                     After-discharge care                Home Medical Care       [facility]   Service: Home Hospice    [id number]  [place] [address]   Phone: [phone]                     Leona spoke with Andres with [facility]. Plan for rep to meet with pt/family at bedside this morning to discuss DME needs and sign consents. [facility] to notify Leona once pt is ready to be discharged.     [id number] Leona spoke with Fiordaliza with [facility]; DME to be delivered to home today. Once pt's oxygen has been delivered to bedside pt can be discharged.

## 2024-10-31 ENCOUNTER — TELEPHONE (OUTPATIENT)
Dept: RADIOLOGY | Facility: HOSPITAL | Age: 69
End: 2024-10-31
Payer: MEDICARE

## 2024-11-01 ENCOUNTER — OUTPATIENT CASE MANAGEMENT (OUTPATIENT)
Dept: ADMINISTRATIVE | Facility: OTHER | Age: 69
End: 2024-11-01
Payer: MEDICARE

## 2024-11-01 ENCOUNTER — PATIENT OUTREACH (OUTPATIENT)
Dept: ADMINISTRATIVE | Facility: CLINIC | Age: 69
End: 2024-11-01
Payer: MEDICARE

## 2024-11-01 NOTE — LETTER
Casie Gaines  [address] [place] [id number]      Dear Casie Gaines,     I am writing from the Outpatient Care Management Department at [facility]. I received a referral from Az Ann RN to contact you regarding any needs you may have. I was unable to reach you by phone. Please contact me for assistance.     I can be reached at [phone] Monday thru Friday from 8:00am to 4:30pm.      Additionally, [facility] also has a program with a nurse available 24/7 to answer questions or provide medical advice. [facility] can be reached at [phone].      Sincerely,        Lorraine Villanueva LMSW  Outpatient Care Management

## 2024-11-06 ENCOUNTER — TELEPHONE (OUTPATIENT)
Dept: INTERNAL MEDICINE | Facility: CLINIC | Age: 69
End: 2024-11-06
Payer: MEDICARE

## 2024-11-06 NOTE — PROGRESS NOTES
Outpatient Care Management   - Patient Assessment    Patient: Casie Gaines  MRN:  [id number]  Date of Service:  11/6/2024  Completed by:  Lorraine Villanueva LMSW  Referral Date: 10/24/2024    Reason for Visit   Patient presents with    Other     11/1/2024  1st attempt to complete Initial Assessment for Outpatient Care Management, mailbox full unable to leave message. Attempt letter sent.    11/05/2024: Requesting call tomorrow.   11/06/2024:Call attempted.        Social Work Assessment     11/06/2024      Case Closure     11/06/2024         Brief Summary:  received a referral from O[facility] RN, Az Ann RN  for the following HR SW psychosocial needs with financial concerns due to her recent cancer diagnosis. The patient also requests assistance with referral request being placed with [facility]. Patient recently d/c from IP stay, would like [employer] meals ordered (Est delivery 13th). Patient would like assistance with Rent, Car note, utilities, [facility] financial aid and SNAP info. Patient does rely on friends and family to assist with IADLs and ADLs. Educated on  Cancer Services , pt would like assistance getting connected. Reviewed online application with pt, and identified the needs pt would like to receive assistance with ( Boost, Fin Aid, Medical supplies). Care plan was created in collaboration with patient/caregiver input.  Identified pt as on Service with Hospice.Communicated with OPCM RN. Will close case due to pt being under the care of Hospice. Will email resources to patient. RN to verify no further assistance on follow up. OPCM VU will close case. Will notify O[facility] RN.

## 2024-11-06 NOTE — TELEPHONE ENCOUNTER
----- Message from William De La Fuente sent at 11/6/2024  8:35 AM CST -----  Contact: [phone]  Pt called              Pt is requesting a call back to speak with provider or staff in regards to some questions that she wants to ask/speak about from past surgery on 10/22/24.

## 2024-11-19 ENCOUNTER — DOCUMENT SCAN (OUTPATIENT)
Dept: HOME HEALTH SERVICES | Facility: HOSPITAL | Age: 69
End: 2024-11-19
Payer: MEDICARE

## 2024-11-19 ENCOUNTER — OUTPATIENT CASE MANAGEMENT (OUTPATIENT)
Dept: ADMINISTRATIVE | Facility: OTHER | Age: 69
End: 2024-11-19
Payer: MEDICARE

## 2025-02-17 ENCOUNTER — EXTERNAL HOME HEALTH (OUTPATIENT)
Dept: HOME HEALTH SERVICES | Facility: HOSPITAL | Age: 70
End: 2025-02-17
Payer: MEDICARE

## 2025-02-18 NOTE — H&P
Nutrition Evaluation    Patient's Name: Renea Artis   Age: 50 y.o.  YOB: 1974   Sex: female    Height: 5 f 8   Starting Weight:  332  Weight: 328   BMI:            Smoking Status:  None  Alcohol Intake:  Number of Drinks at a Time: None  Number of Times a Week: None    Changes made during classes include:  Cut out soda  Less snacking  Lower carbohydrates  More water        Summary:  I feel that Renea Artis has demonstrated appropriate diet changes and is ready to move forward with surgery.  Patient has been briefed on the importance of the protein drinks, vitamins, and the transition of the diet stages. Patient understands that the long-term diet will focus on protein and vegetables.  Patient understand the effects of carbohydrates after surgery and what reactive hypoglycemia is.      Patient is aware that they will be attending pre-op class 2 weeks before surgery and will get more detailed information on the post-op diet guidelines.    Patient will see me again at 6 weeks post-op. At this 6 week visit, RD will assess how patient is tolerating soft protein and advance to vegetables, if tolerating soft protein without difficulty.  Patient will also see RD again at 9 months post-op.  This visit will assess patient's compliance with current protocol, including diet, vitamins, protein shakes, and exercise.  Post-op diet guidelines will be reinforced.  RD is available for questions and to meet with patient outside of the 6 week and 9 month post-op visit.     We spent a lot of time talking about the vitamins.  Patient understands the importance of being compliant with the diet protocol and the complications and risks that can occur if they are non-compliant with the nutritional protocol.     Patient has attended at least one support group.    Candidate for surgery: Yes  Re-evaluation Date:       Keeley Last RD    2/18/2025               [facility]  History & Physical    Patient Name: Casie Gaines  MRN: [id number]  Patient Class: IP- Inpatient  Admission Date: 10/22/2024  Attending Physician:  Hue Alfred MD  Primary Care Provider: Rossana Ang MD         Patient information was obtained from patient, ER records, and ER physician .     Subjective:     Principal Problem:Acute renal failure superimposed on stage 3a chronic kidney disease    Chief Complaint:   Chief Complaint   Patient presents with    Abnormal Lab     Pt sent by Dr. House for further workup d/t elevated ALP, AST, ALT, and creatinine. Pt has swelling and pain to bilateral legs. Pt does not do dialysis. (+) SOB; pt wears 4L o2 at home. Pt reports normally wearing 3L o2 but changed to 4L today. Pt's BP earlier today was 88/58. 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 House from clinic for hypotension and abnormal labs with acute kidney injury and worsening liver enzymes.  Symptoms were acute onset, moderate severity, and with blood pressure improved with albumin administration given in the emergency department.  Patient denies any associated chest pain, increased shortness of breath, cough, fevers, chills.  She does complain of recurrent episodes of nausea with vomiting.  She has chronic abdominal pain but has noted increasing distention to her abdomen as well as increasing lower extremity swelling.  She denies any diarrhea,  constipation, dysuria, or hematuria.  Patient is actively receiving chemotherapy with last dose received 10/15/2024.    Last hospital admit 10/2-10/07/2024 for shortness for breath, new bilateral pleural effusion, pneumonia, and images with worsening metastatic disease.  Patient received IV cefepime.    Past Medical History:   Diagnosis Date    Anemia     Anxiety     Arthritis     knees    Cancer     ovarian    CHF (congestive heart failure)     Hx antineoplastic chemotherapy     last 2019    Hyperlipemia     Hypertension     Neck pain     Ovarian cancer 2019    CHEMO    Peritoneal carcinomatosis 2019       Past Surgical History:   Procedure Laterality Date    breast reduction  10/02/2018    CATARACT EXTRACTION Bilateral     2023     SECTION      X 1    COLONOSCOPY N/A 2021    Procedure: COLONOSCOPY;  Surgeon: Paulette Rojas MD;  Location: [facility];  Service: Gastroenterology;  Laterality: N/A;    CYSTOSCOPY W/ URETERAL STENT PLACEMENT Right 2019    Procedure: CYSTOSCOPY, WITH URETERAL STENT INSERTION;  Surgeon: Timmy Santiago IV, MD;  Location: [facility] OR;  Service: Urology;  Laterality: Right;    CYSTOSCOPY W/ URETERAL STENT REMOVAL  10/04/2019    DILATION AND CURETTAGE OF UTERUS      HYSTERECTOMY      RALH for fibroids (still has ovaries)    INSERTION OF VENOUS ACCESS PORT Left 2019    Procedure: INSERTION, VENOUS ACCESS PORT;  Surgeon: Ulisses Monzon MD;  Location: [facility] OR;  Service: General;  Laterality: Left;  Left internal jugular     LYSIS OF ADHESIONS OF URETER N/A 08/15/2019    Procedure: URETEROLYSIS;  Surgeon: Ismael Juarez MD;  Location: [facility], [place], operated by [facility] OR;  Service: OB/GYN;  Laterality: N/A;    OMENTECTOMY N/A 08/15/2019    Procedure: OMENTECTOMY;  Surgeon: Ismael Juarez MD;  Location: [facility], [place], operated by [facility] OR;  Service: OB/GYN;  Laterality: N/A;    OOPHORECTOMY      RETROGRADE PYELOGRAPHY Right 2019    Procedure: PYELOGRAM, RETROGRADE;  Surgeon: Timmy Santiago IV, MD;   Location: [facility] OR;  Service: Urology;  Laterality: Right;    ROBOT-ASSISTED LAPAROSCOPIC SALPINGO-OOPHORECTOMY USING DA TIA XI Bilateral 08/15/2019    Procedure: XI ROBOTIC SALPINGO-OOPHORECTOMY;  Surgeon: Ismael Juarez MD;  Location: [facility] OR;  Service: OB/GYN;  Laterality: Bilateral;    TOTAL REDUCTION MAMMOPLASTY  2018    TUBAL LIGATION         Review of patient's allergies indicates:  No Known Allergies    No current facility-administered medications on file prior to encounter.     Current Outpatient Medications on File Prior to Encounter   Medication Sig    amLODIPine (NORVASC) 5 MG tablet Take 2 tablets (10 mg total) by mouth once daily.    atenoloL (TENORMIN) 25 MG tablet Take 25 mg by mouth once daily.    biotin 1 mg tablet Take 1,000 mcg by mouth once daily.     diclofenac sodium (VOLTAREN) 1 % Gel Apply once a day to back as needed    gabapentin (NEURONTIN) 300 MG capsule Take 2 capsules (600 mg total) by mouth 3 (three) times daily.    ginkgo biloba 40 mg Tab Take 40 mg by mouth once daily.    multivitamin with minerals tablet Take 1 tablet by mouth once daily.     olmesartan-hydrochlorothiazide (BENICAR HCT) 40-25 mg per tablet Take 1 tablet by mouth once daily.    ondansetron (ZOFRAN-ODT) 8 MG TbDL Take 1 tablet (8 mg total) by mouth every 8 (eight) hours.    rosuvastatin (CRESTOR) 10 MG tablet Take 1 tablet (10 mg total) by mouth once daily.    sertraline (ZOLOFT) 25 MG tablet Take 1 tablet (25 mg total) by mouth once daily.    albuterol (PROVENTIL/VENTOLIN HFA) 90 mcg/actuation inhaler Inhale 2 puffs into the lungs every 4 (four) hours as needed for Wheezing. Rescue    ALPRAZolam (XANAX) 0.25 MG tablet Take 1 tablet (0.25 mg total) by mouth 3 (three) times daily as needed for Anxiety.    cetirizine (ZYRTEC) 10 MG tablet Take 1 tablet (10 mg total) by mouth once daily. (Patient taking differently: Take 10 mg by mouth as needed for Allergies.)    EPINEPHrine (EPIPEN) 0.3 mg/0.3 mL AtIn Inject 0.3 mLs  (0.3 mg total) into the muscle once. for 1 dose    fluticasone propionate (FLONASE) 50 mcg/actuation nasal spray 1 spray (50 mcg total) by Each Nostril route every 12 (twelve) hours as needed for Rhinitis.    prochlorperazine (COMPAZINE) 5 MG tablet Take 1 tablet (5 mg total) by mouth every 6 (six) hours as needed for Nausea.    traMADoL (ULTRAM) 50 mg tablet Take 1 tablet (50 mg total) by mouth every 12 (twelve) hours as needed for Pain.     Family History       Problem Relation (Age of Onset)    Anesthesia problems Other    Breast cancer Maternal Aunt, Paternal Aunt    Colon cancer Brother    Glaucoma Mother    No Known Problems Father    Ovarian cancer Paternal Aunt          Tobacco Use    Smoking status: Former     Current packs/day: 0.00     Average packs/day: 1 pack/day for 25.0 years (25.0 ttl pk-yrs)     Types: Cigarettes     Start date: 10/1/1993     Quit date: 10/1/2018     Years since quittin.0    Smokeless tobacco: Never    Tobacco comments:     States started quit 2 months ago after 30 years   Substance and Sexual Activity    Alcohol use: Yes     Comment: occasionally  No alcohol 72h prior to sx    Drug use: No    Sexual activity: Not Currently     Partners: Male     Comment: hyst; mut monog     Review of Systems   Constitutional:  Negative for chills and fever.   Respiratory:  Positive for shortness of breath (chronic, no change). Negative for cough.    Cardiovascular:  Positive for leg swelling. Negative for chest pain.   Gastrointestinal:  Positive for abdominal distention, abdominal pain (chronic), nausea and vomiting. Negative for constipation and diarrhea.   Genitourinary:  Negative for difficulty urinating, dysuria and hematuria.   All other systems reviewed and are negative.    Objective:     Vital Signs (Most Recent):  Temp: 97.8 °F (36.6 °C) (10/22/24 2048)  Pulse: 80 (10/22/24 2048)  Resp: 16 (10/22/24 2048)  BP: (!) 122/58 (10/22/24 2048)  SpO2: 98 % (10/22/24 2048) Vital Signs (24h  Range):  Temp:  [97 °F (36.1 °C)-98.3 °F (36.8 °C)] 97.8 °F (36.6 °C)  Pulse:  [74-81] 80  Resp:  [16-23] 16  SpO2:  [93 %-100 %] 98 %  BP: ()/(55-63) 122/58     Weight: 104.6 kg (230 lb 9.6 oz)  Body mass index is 36.12 kg/m².     Physical Exam  Vitals reviewed.   Constitutional:       General: She is not in acute distress.     Appearance: She is obese. She is not diaphoretic.   HENT:      Nose: Nose normal.   Eyes:      Conjunctiva/sclera: Conjunctivae normal.   Cardiovascular:      Rate and Rhythm: Normal rate.   Pulmonary:      Effort: Pulmonary effort is normal. No respiratory distress.   Abdominal:      General: There is distension.      Tenderness: There is abdominal tenderness. There is no rebound.   Musculoskeletal:      Right lower leg: Edema present.      Left lower leg: Edema present.   Skin:     General: Skin is warm and dry.   Neurological:      Mental Status: She is alert and oriented to person, place, and time.   Psychiatric:         Mood and Affect: Mood normal.         Behavior: Behavior normal.                Significant Labs: All pertinent labs within the past 24 hours have been reviewed.  Recent Lab Results         10/22/24  1928   10/22/24  1829   10/22/24  1015        Albumin   2.3   2.5       ALP   1,018   1,122       ALT   247   270       Anion Gap   10   10       Appearance, UA Hazy           PTT   64.7  Comment: Refer to local heparin nomogram for intensity/dose specific   therapeutic   range.           AST   720   774       Bacteria, UA Rare           Baso #   0.04   0.04       Basophil %   1.0   0.9       Bilirubin (UA) Negative           BILIRUBIN TOTAL   1.2  Comment: For infants and newborns, interpretation of results should be based  on gestational age, weight and in agreement with clinical  observations.    Premature Infant recommended reference ranges:  Up to 24 hours.............<8.0 mg/dL  Up to 48 hours............<12.0 mg/dL  3-5 days..................<15.0 mg/dL  6-29  days.................<15.0 mg/dL     1.3  Comment: For infants and newborns, interpretation of results should be based  on gestational age, weight and in agreement with clinical  observations.    Premature Infant recommended reference ranges:  Up to 24 hours.............<8.0 mg/dL  Up to 48 hours............<12.0 mg/dL  3-5 days..................<15.0 mg/dL  6-29 days.................<15.0 mg/dL         BNP   27  Comment: Values of less than 100 pg/ml are consistent with non-CHF populations.         BUN   80   84       Calcium   8.3   9.2       Chloride   110   108       CO2   19   22       Color, UA Yellow           Creatinine   2.4   2.6       Differential Method   Automated   Automated       eGFR   21   19       Eos #   0.0   0.1       Eos %   0.7   2.0       Glucose   79   85       Glucose, UA Negative           Gran # (ANC)   3.1   3.2       Gran %   75.9   71.5       Hematocrit   33.8   41.7       Hemoglobin   10.6   12.7       Hyaline Casts, UA 8           Immature Grans (Abs)   0.01  Comment: Mild elevation in immature granulocytes is non specific and   can be seen in a variety of conditions including stress response,   acute inflammation, trauma and pregnancy. Correlation with other   laboratory and clinical findings is essential.     0.01  Comment: Mild elevation in immature granulocytes is non specific and   can be seen in a variety of conditions including stress response,   acute inflammation, trauma and pregnancy. Correlation with other   laboratory and clinical findings is essential.         Immature Granulocytes   0.2   0.2       INR   1.2  Comment: Coumadin Therapy:  2.0 - 3.0 for INR for all indicators except mechanical heart valves  and antiphospholipid syndromes which should use 2.5 - 3.5.           Ketones, UA Negative           Lactic Acid Level   1.0  Comment: Falsely low lactic acid results can be found in samples   containing >=13.0 mg/dL total bilirubin and/or >=3.5 mg/dL   direct  bilirubin.           Leukocyte Esterase, UA Negative           Lymph #   0.6   0.8       Lymph %   14.4   17.0       Magnesium    2.4   2.5       MCH   27.7   27.5       MCHC   31.4   30.5       MCV   89   90       Microscopic Comment SEE COMMENT  Comment: Other formed elements not mentioned in the report are not   present in the microscopic examination.              Mono #   0.3   0.4       Mono %   7.8   8.4       MPV   9.9   10.3       NITRITE UA Negative           nRBC   0   0       Blood, UA 2+           pH, UA 5.0           Platelet Count   211   257       Potassium   4.4   4.9       PROTEIN TOTAL   5.9   6.5       Protein, UA 1+  Comment: Recommend a 24 hour urine protein or a urine   protein/creatinine ratio if globulin induced proteinuria is  clinically suspected.             PT   13.5         RBC   3.82   4.62       RBC, UA >100           RDW   19.3   19.8       Sodium   139   140       Spec Grav UA 1.020           Specimen UA Urine, Clean Catch           Squam Epithel, UA 1           Troponin I   <0.006  Comment: The reference interval for Troponin I represents the 99th percentile   cutoff   for our facility and is consistent with 3rd generation assay   performance.           UROBILINOGEN UA 2.0-3.0           WBC, UA 5           WBC   4.10   4.41               Significant Imaging: I have reviewed all pertinent imaging results/findings within the past 24 hours.  X-Ray Chest AP Portable   Final Result      1.  Similar degree of vascular congestion versus reticular interstitial changes throughout the lungs with patchy ground-glass opacities.  Stable small to moderate left effusion.  New small right effusion.  Differential considerations would include chronic or recurrent CHF versus interstitial infectious process such as atypical viral pneumonia.  Clinical correlation is advised.      2.  Stable findings as noted above.         Electronically signed by: Partha Miguel MD   Date:    10/22/2024   Time:    17:30       US Abdomen Limited    (Results Pending)      Assessment/Plan:     * Acute renal failure superimposed on stage 3a chronic kidney disease  MIRELA is likely due to pre-renal azotemia due to dehydration related to nausea with vomiting, hypotension, inadequate oral intake, and likely also related to progressive metastatic disease. Baseline creatinine is  1.1-1.3 . Most recent creatinine and eGFR are listed below.  Recent Labs     10/22/24  1015 10/22/24  1829   CREATININE 2.6* 2.4*   EGFRNORACEVR 19* 21*      Plan  - MIRELA is worsening. Will continue current treatment--> patient received albumin 25% 25 g IV x1 dose in the emergency department.  We will encourage increased fluid intake, Pedialyte 600 mL p.o. q.4 hours ordered  - Avoid nephrotoxins and renally dose meds for GFR listed above  - Monitor urine output, serial BMP, and adjust therapy as needed  - check urinalysis with reflex culture  -check bladder scan  -check abdominal ultrasound  -hold Benicar/HCT  -recent CT scan of abdomen and pelvis and PET scan were reviewed  -patient with history of ureteral stent placement (I am unable to find op note/date)  -patient may ultimately need nephrology and urology consultations    Hypotension due to hypovolemia  -hypotension improved with IV albumin given in the emergency department  -hold Norvasc, atenolol, Benicar/HCT  -limit sedating agents      Nausea & vomiting  -bland diet, Pedialyte scheduled, IV albumin x1 dose  -p.r.n. Antiemetics  -despite IV fluid shortage, may ultimately have to start IV fluids      Elevated liver enzymes  Elevated liver enzymes likely secondary to progression of metastatic disease however hypotension may be playing a role with possibility of underlying shock liver  -alk phos 1018, total bilirubin 1.2, , , INR 1.2, BNP 27, lactic acid level 1.0, creatinine 2.4  -check thyroid studies, CK level, acute hepatitis panel, aspirin level, and acetaminophen level  -hold Crestor and avoid  "hepatotoxic agents  -check abdominal ultrasound  -recent CT scan of abdomen/pelvis and PET scan were reviewed  -consider hepatology and IR consults, patient may need liver biopsy if she elects against palliative care  -check a.m. labs      Ovarian cancer, bilateral  Stage IV metastatic bilateral ovarian cancer with peritoneal carcinomatosis  -past bilateral salpingo-oophorectomy  -currently receiving chemotherapy, last dose on Tuesday 10/15/2024  -CTA of chest 10/02/2024 with No pulmonary emboli. New moderate-sized bilateral pleural effusions as described above with adjacent atelectasis or consolidation.No enlarged mediastinal and hilar lymph nodes. Intralobular septal thickening and associated groundglass opacity bilaterally felt to represent edema.New irregular soft tissue mass in the left upper quadrant concerning for carcinomatosis."  -CT scan of abdomen and pelvis 10/06/2024 with Widespread metastatic disease suspected with abnormal lymph nodes within the retroperitoneum, jovan hepatis, mediastinum as well as multiple abnormal liver lesions and focal thickening within the large bowel. Patient needs tissue sampling of liver lesion. Recommend colonoscopy and possible biopsy of sigmoid lesion."  -nuclear medicine PET scan 10/11/2024 with Overall there has been progression of disease. There is continued hypermetabolic adenopathy within the neck, chest (mediastinum and chest wall).  Some of these areas are stable with some nodes slightly diminished and some decreased degrees of FDG uptake within some of these nodes. However, there is extensive progression of peritoneal carcinomatosis throughout the abdomen and pelvis with increasing number and size of peritoneal hypermetabolic nodularity and increasing amount of ascites.  There is also large amount heterogeneous FDG uptake within the left hepatic lobe concerning for hepatic metastatic disease which is new. there has also been interval development moderate " "right-sided hydronephrosis and hydroureter with ureter likely being obstructed by peritoneal implants. increasing small to moderate bilateral loculated pleural effusions.  Extensive mixed interstitial and ground-glass infiltrates throughout the lungs.  Question pneumonia/post treatment related changes.  Lymphangitic carcinomatosis could appear similar"  -hematology oncology consult for possible palliative care/hospice discussions; if patient is not amenable to this, she will need additional consultations to include Urology, Nephrology, hepatology, GI, and possible IR liver biopsy  -patient is currently a full code  -p.r.n. pain control            Peritoneal carcinomatosis  See discussion for bilateral ovarian cancer    Chronic respiratory failure with hypoxia  Patient with Hypoxic Respiratory failure which is Chronic.  she is on home oxygen at 3-4 LPM. Supplemental oxygen was provided and noted- Oxygen Concentration (%):  [36] 36    -CXR this admit with " Similar degree of vascular congestion versus reticular interstitial changes throughout the lungs with patchy ground-glass opacities.  Stable small to moderate left effusion.  New small right effusion.  Differential considerations would include chronic or recurrent CHF versus interstitial infectious process such as atypical viral pneumonia.  Clinical correlation is advised."  -recent CTA of chest 10/02/2024 was reviewed  -white blood cell count 4.10, lactic acid level 1.0, BNP 27, afebrile  -check lactic acid level and procalcitonin level  -no antibiotics warranted at this time  -no diuretics warranted at this time  -continuous pulse oximetry monitoring, O2 supplementation, incentive spirometry, and p.r.n. albuterol inhaler    Chronic diastolic CHF (congestive heart failure)  Patient has Diastolic (HFpEF) heart failure that is Chronic. On presentation their CHF was well compensated. Most recent BNP and echo results are listed below.  Recent Labs     10/22/24  0592 "   BNP 27     Latest ECHO  Results for orders placed during the hospital encounter of 09/03/24    Echo    Interpretation Summary    Left Ventricle: The left ventricle is normal in size. Normal wall thickness. There is normal systolic function with a visually estimated ejection fraction of 60 - 65%. Biplane (2D) method of discs ejection fraction is 56%. Global longitudinal strain is -19.5%. There is normal diastolic function.    Right Ventricle: Normal right ventricular cavity size. Wall thickness is normal. Systolic function is normal.    IVC/SVC: Normal venous pressure at 3 mmHg.    Malignant neoplasm of both ovaries    Baseline echo 9/3/2024  NORM -19.47%  Biplane 56%    Current Heart Failure Medications  hydrALAZINE injection 10 mg, Every 6 hours PRN, Intravenous    Plan  - Monitor strict I&Os and daily weights.    - Place on telemetry  - Cardiology has not been consulted  - The patient's volume status is at their baseline          Normocytic anemia  Anemia is likely due to chronic disease due to Malignancy and chronic kidney disease. Most recent hemoglobin and hematocrit are listed below.  Recent Labs     10/22/24  1015 10/22/24  1829   HGB 12.7 10.6*   HCT 41.7 33.8*     Plan  - Monitor serial CBC: Daily  - Transfuse PRBC if patient becomes hemodynamically unstable, symptomatic or H/H drops below 7/21.  - Patient has not received any PRBC transfusions to date  - Patient's anemia is currently stable      History of DVT (deep vein thrombosis)  -recent left lower extremity venous ultrasound 10/15/2024 was negative for DVT  -bilateral lower extremity edema most likely related to metastatic disease and hypoalbuminemia  -treatment dose Lovenox (renally dosed) for now      JERALD (obstructive sleep apnea)  Nocturnal CPAP      Hyperlipidemia  -hold Crestor in the setting of worsening LFTs  -check CK level        VTE Risk Mitigation (From admission, onward)           Ordered     enoxaparin injection 105 mg  Every 24 hours          10/22/24 2032     IP VTE HIGH RISK PATIENT  Once         10/22/24 2032     Place sequential compression device  Until discontinued         10/22/24 2032                                    Hue Alfred MD  Department of Hospital Medicine  [facility]

## 2025-06-25 NOTE — PROGRESS NOTES
Met with patient and her caregiver and  in person_to discuss upcoming systemic therapy initiation. Discussed patient diagnosis including staging information. Also discussed that therapy regimen prescribed involves the following drugs: _MVASI, Doxil and Compazine__, and timeline of therapy administration. Went over what to expect on first day of treatment, including what to bring with you, visitor policy, and infusion suite guidelines. Also discussed supportive and shared services available to patient, including social work, financial counseling, oncology nutrition, and oncology psychology.      Covered with patient potential side effects and symptom management.  Red urine 48 hours / hand foot syndrome Reviewed supportive medications that will be given before, during, and after treatment. Also stressed that other side effects are possible outside of those listed. Spent additional time on signs of infection, infection prevention, and proper nutrition/hydration.    Education provided to patient via (_chemotherapy education binder___). Also provided contact information for clinic and information related to [facility] communication. Discussed communication process for after-hours needs and some common scenarios in which patient should call provider for guidance vs. immediately report to the emergency room.     Finally, patient was given  contact information and role of oncology navigator was discussed. Encouraged patient to call with any questions, concerns, or needs. Patient verbalized understanding of all above information.      
I have personally seen and examined the patient. I have collaborated with and supervised the

## (undated) DEVICE — SKIN MARKER DEVON 160

## (undated) DEVICE — SEE MEDLINE ITEM 152622

## (undated) DEVICE — GAUZE SPONGE 4X4 12PLY

## (undated) DEVICE — BOWL STERILE LARGE 32OZ

## (undated) DEVICE — NDL SAFETY 25G X 1.5 ECLIPSE

## (undated) DEVICE — SEE MEDLINE ITEM 157110

## (undated) DEVICE — GOWN SMARTGOWN LVL4 X-LONG XL

## (undated) DEVICE — SPONGE DERMACEA 4X4IN 12PLY

## (undated) DEVICE — SHEET THYROID W/ISO-BAC

## (undated) DEVICE — PORT ACCESS 8MM W/120MM LOW

## (undated) DEVICE — GOWN POLY REINF BRTH SLV XL

## (undated) DEVICE — SUPPORT ULNA NERVE PROTECTOR

## (undated) DEVICE — GLOVE PROTEXIS HYDROGEL SZ8

## (undated) DEVICE — GUIDEWIRE AMPLATZ .035X145CM

## (undated) DEVICE — SOL WATER STRL IRR 1000ML

## (undated) DEVICE — SEE MEDLINE ITEM 156923

## (undated) DEVICE — CANISTER SUCTION JUMBO 12L

## (undated) DEVICE — SUT VICRYL 3-0 27 SH

## (undated) DEVICE — SOL IRRI STRL WATER 1000ML

## (undated) DEVICE — SUT PDS II O CT-2 VIL MONO

## (undated) DEVICE — ELECTRODE REM PLYHSV RETURN 9

## (undated) DEVICE — CLOSURE SKIN STERI STRIP 1/2X4

## (undated) DEVICE — GLOVE PROTEXIS HYDROGEL SZ7

## (undated) DEVICE — OBTURATOR BLADELESS 8MM XI CLR

## (undated) DEVICE — APPLICATOR CHLORAPREP ORN 26ML

## (undated) DEVICE — SEE MEDLINE ITEM 157117

## (undated) DEVICE — DRAPE MOBILE C-ARM

## (undated) DEVICE — KIT SITE-RITE NDL GUIDE 21G

## (undated) DEVICE — NDL PNEUMOPERITONEUM 150MM

## (undated) DEVICE — GLOVE 7.0 PROTEXIS PI BLUE

## (undated) DEVICE — INSERT CUSHIONPRONE VIEW LARGE

## (undated) DEVICE — NDL HYPODERMIC BLUNT 18G 1.5IN

## (undated) DEVICE — CATH POLLACK OPEN-END FLEXI-TI

## (undated) DEVICE — ADHESIVE MASTISOL VIAL 48/BX

## (undated) DEVICE — CONTAINER SPECIMEN STRL 4OZ

## (undated) DEVICE — JELLY SURGILUBE 5GR

## (undated) DEVICE — SET IRRIGATION WARMING NORMAL

## (undated) DEVICE — UNDERGLOVES BIOGEL PI SZ 7 LF

## (undated) DEVICE — SEE MEDLINE ITEM 152186

## (undated) DEVICE — GLOVE SIGNATURE ESSNTL LTX 7.5

## (undated) DEVICE — DEVICE ANC SW STAT FOLEY 6-24

## (undated) DEVICE — TROCAR ENDOPATH XCEL 5MM 15CM

## (undated) DEVICE — BAG TISS RETRV MONARCH 10MM

## (undated) DEVICE — COVER OVERHEAD SURG LT BLUE

## (undated) DEVICE — UROVIEW 2600/2800

## (undated) DEVICE — POSITIONER HEAD ADULT

## (undated) DEVICE — TRAY SKIN SCRUB WET 4 COMPART

## (undated) DEVICE — SET TRI-LUMEN FILTERED TUBE

## (undated) DEVICE — NDL INSUF ULTRA VERESS 120MM

## (undated) DEVICE — SYR 10CC LUER LOCK

## (undated) DEVICE — GUIDE WIRE MOTION .035 X 150CM

## (undated) DEVICE — TOWEL OR DISP STRL BLUE 4/PK

## (undated) DEVICE — DRAPE T CYSTOSCOPY STERILE

## (undated) DEVICE — SEE MEDLINE ITEM 154981

## (undated) DEVICE — SOL 9P NACL IRR PIC IL

## (undated) DEVICE — SEE MEDLINE ITEM 157027

## (undated) DEVICE — DRESSING TRANS 4X4 TEGADERM

## (undated) DEVICE — SOL NACL IRR 3000ML

## (undated) DEVICE — SOL CLEARIFY VISUALIZATION LAP

## (undated) DEVICE — GLOVE BIOGEL SKINSENSE PI 7.0

## (undated) DEVICE — SEE MEDLINE ITEM 152487

## (undated) DEVICE — SYR ONLY LUER LOCK 20CC

## (undated) DEVICE — DRAPE COLUMN DAVINCI XI

## (undated) DEVICE — SUT MCRYL PLUS 4-0 PS2 27IN

## (undated) DEVICE — SEE MEDLINE ITEM 157185

## (undated) DEVICE — SUT MONOCRYL 4.0 PS2 CP496G

## (undated) DEVICE — APPLICATOR HEMOBLAST LAPSCP

## (undated) DEVICE — POSITIONER HEAD DONUT 9IN FOAM

## (undated) DEVICE — IRRIGATOR ENDOSCOPY DISP.

## (undated) DEVICE — COVER TIP CURVED SCISSORS XI

## (undated) DEVICE — KIT WING PAD POSITIONING

## (undated) DEVICE — SEALER LIGASURE IMPACT 18CM

## (undated) DEVICE — CONTAINER SPECIMEN OR STER 4OZ

## (undated) DEVICE — GLOVE PROTEXIS NEOPRENE 7.5

## (undated) DEVICE — SEAL UNIVERSAL 5MM-8MM XI

## (undated) DEVICE — SPONGE COTTON TRAY 4X4IN

## (undated) DEVICE — BELLOW CANN HEMOBLAST 1.65GR

## (undated) DEVICE — SOL IRR NACL .9% 3000ML

## (undated) DEVICE — CATH 5FR OPEN END URETERAL

## (undated) DEVICE — DRAPE ARM DAVINCI XI

## (undated) DEVICE — SOL NS 1000CC

## (undated) DEVICE — SOL ELECTROLUBE ANTI-STIC

## (undated) DEVICE — GOWN POLY REINF X-LONG XL

## (undated) DEVICE — GLOVE 8 PROTEXIS PI BLUE

## (undated) DEVICE — KIT PTFE INTRO 8FR

## (undated) DEVICE — MANIFOLD 4 PORT

## (undated) DEVICE — WIRE GD LUB ANG 3CM .038 150CM

## (undated) DEVICE — SET IRR URLGY 2LINE UNIV SPIKE

## (undated) DEVICE — IRRIGATION SET Y-TYPE TUR/BLAD